# Patient Record
Sex: MALE | Race: WHITE | NOT HISPANIC OR LATINO | Employment: OTHER | URBAN - METROPOLITAN AREA
[De-identification: names, ages, dates, MRNs, and addresses within clinical notes are randomized per-mention and may not be internally consistent; named-entity substitution may affect disease eponyms.]

---

## 2016-05-26 LAB
ALT SERPL-CCNC: 9 U/L
AST SERPL-CCNC: 113 U/L
BUN BLD-MCNC: 20 MG/DL (ref 4–21)
CALCIUM SERPL-MCNC: 9.4 MG/DL
CHLORIDE: 110 (ref 98–107)
CHOLEST SERPL-MSCNC: 151 MG/DL (ref 0–200)
CREAT SERPL-MCNC: 1.2 MG/DL
GLUCOSE: 97
HDLC SERPL-MCNC: 44 MG/DL
LDLC SERPL CALC-MCNC: 94 MG/DL (ref 0–160)
POTASSIUM: 4.9
RISK: 3.4
SODIUM: 143
TRIGL SERPL-MCNC: 67 MG/DL

## 2017-04-23 RX ORDER — HYDROCHLOROTHIAZIDE 12.5 MG/1
CAPSULE ORAL
Qty: 90 CAPSULE | Refills: 11 | Status: SHIPPED | OUTPATIENT
Start: 2017-04-23 | End: 2017-06-27

## 2017-05-31 DIAGNOSIS — F41.9 ANXIETY: ICD-10-CM

## 2017-05-31 RX ORDER — ALPRAZOLAM 0.5 MG/1
TABLET ORAL
Qty: 90 TABLET | Refills: 0 | Status: SHIPPED | OUTPATIENT
Start: 2017-05-31 | End: 2018-01-19

## 2017-06-14 ENCOUNTER — TELEPHONE (OUTPATIENT)
Dept: FAMILY MEDICINE | Facility: CLINIC | Age: 67
End: 2017-06-14

## 2017-06-14 NOTE — TELEPHONE ENCOUNTER
Lab work was entered with the wrong year    Can tell him when you call the wife to come in for annual visit with myself or Lori

## 2017-06-27 ENCOUNTER — OFFICE VISIT (OUTPATIENT)
Dept: FAMILY MEDICINE | Facility: CLINIC | Age: 67
End: 2017-06-27
Payer: MEDICARE

## 2017-06-27 VITALS
TEMPERATURE: 99 F | DIASTOLIC BLOOD PRESSURE: 84 MMHG | BODY MASS INDEX: 25.59 KG/M2 | HEIGHT: 68 IN | WEIGHT: 168.88 LBS | OXYGEN SATURATION: 97 % | HEART RATE: 84 BPM | SYSTOLIC BLOOD PRESSURE: 132 MMHG

## 2017-06-27 DIAGNOSIS — I49.3 PVC (PREMATURE VENTRICULAR CONTRACTION): ICD-10-CM

## 2017-06-27 DIAGNOSIS — F41.9 ANXIETY: ICD-10-CM

## 2017-06-27 DIAGNOSIS — Z11.59 NEED FOR HEPATITIS C SCREENING TEST: ICD-10-CM

## 2017-06-27 DIAGNOSIS — I10 ESSENTIAL HYPERTENSION: ICD-10-CM

## 2017-06-27 DIAGNOSIS — Z00.00 ROUTINE HEALTH MAINTENANCE: Primary | ICD-10-CM

## 2017-06-27 PROCEDURE — 99397 PER PM REEVAL EST PAT 65+ YR: CPT | Mod: S$GLB,,, | Performed by: FAMILY MEDICINE

## 2017-06-27 PROCEDURE — 99499 UNLISTED E&M SERVICE: CPT | Mod: S$GLB,,, | Performed by: FAMILY MEDICINE

## 2017-06-27 NOTE — PROGRESS NOTES
Patient ID: Rodolfo Messina is a 67 y.o. male.    Chief Complaint: Follow-up (lab results and check-up)    HPI      Rodolfo Messina is a 67 y.o. male. here for annual exam.   No new complaints.  Patient with hypertension-stable medicines which include Zestril 40 mg daily.  And hydrochlorothiazide.  Patient with anxiety uses periodically for such.  Patient with ALLERGIC rhinitis and uses Claritin  History of PVCs-no new problems.  Patient with essential tremor-no worse at this time.        Review of Symptoms    Constitutional: Negative.    HENT: Negative.    Eyes: Negative.    Respiratory: Negative.    Cardiovascular: Negative.    Gastrointestinal: Negative.    Endocrine: Negative.    Genitourinary: Negative.    Musculoskeletal: Negative.    Skin: Negative.    Allergic/Immunologic: Negative.    Neurological: Negative.    Hematological: Negative.    Psychiatric/Behavioral: Negative.      Except as above in HPI        Physical  Exam    Constitutional:  Oriented to person, place, and time. Appears well-developed and well-nourished.     HENT:   Head: Normocephalic and atraumatic.     Right Ear: Tympanic membrane, external ear and ear canal normal.     Left Ear: Tympanic membrane, external ear and ear canal normal.     Nose: Nose normal. No rhinorrhea or nasal deformity.     Mouth/Throat: Uvula is midline, oropharynx is clear and moist and mucous membranes are normal.      Eyes: Conjunctivae are normal. Right eye exhibits no discharge. Left eye exhibits no discharge. No scleral icterus.     Neck:  No JVD present. No tracheal deviation  [x]  Neck supple.   [x]  No Carotid bruit    Cardiovascular: Normal rate, regular rhythm and normal heart sounds.      Pulmonary/Chest: Effort normal and breath sounds normal. No stridor. No respiratory distress. No wheezes. No rales.      Musculoskeletal: Normal range of motion. No edema or tenderness.   No deformity     Lymphadenopathy:  No cervical adenopathy.     Neurological:   Alert and oriented to person, place, and time. Coordination normal.   Slight tremor       Skin: Skin is warm and dry. No rash noted.     Psychiatric: Normal mood and affect. Speech is normal and behavior is normal. Judgment and thought content normal.     Complete Blood Count  Lab Results   Component Value Date    RBC 3.80 (L) 12/08/2015    HGB 12.5 (L) 12/08/2015    HCT 36.0 (L) 12/08/2015    MCV 95 12/08/2015    MCH 32.9 (H) 12/08/2015    MCHC 34.7 12/08/2015    RDW 12.8 12/08/2015     12/08/2015    MPV 11.0 12/08/2015    GRAN 2.3 12/08/2015    GRAN 56.4 12/08/2015    LYMPH 1.2 12/08/2015    LYMPH 29.9 12/08/2015    MONO 0.5 12/08/2015    MONO 10.9 12/08/2015    EOS 0.1 12/08/2015    BASO 0.01 12/08/2015    EOSINOPHIL 2.4 12/08/2015    BASOPHIL 0.2 12/08/2015    DIFFMETHOD Automated 12/08/2015       Comprehensive Metabolic Panel  Lab Results   Component Value Date     12/08/2015    BUN 20 05/26/2016    CREATININE 1.2 05/26/2016     05/26/2016    K 4.9 05/26/2016     (A) 05/26/2016    PROT 6.6 12/08/2015    ALBUMIN 3.9 12/08/2015    BILITOT 1.1 (H) 12/08/2015     05/26/2016    ALKPHOS 52 (L) 12/08/2015    CO2 26 12/08/2015    ALT 9 05/26/2016    ANIONGAP 8 12/08/2015    EGFRNONAA >60.0 12/08/2015    ESTGFRAFRICA >60.0 12/08/2015       TSH  Lab Results   Component Value Date    TSH 1.416 12/08/2015       Assessment / Plan:      ICD-10-CM ICD-9-CM   1. Routine health maintenance Z00.00 V70.0   2. Need for hepatitis C screening test Z11.59 V73.89   3. Essential hypertension I10 401.9   4. PVC (premature ventricular contraction) I49.3 427.69   5. Anxiety F41.9 300.00     Routine health maintenance  -     Comprehensive metabolic panel; Future  -     Lipid panel; Future    Need for hepatitis C screening test  -     Hepatitis C antibody; Future; Expected date: 06/27/2017    Essential hypertension  -     Lipid panel; Future    PVC (premature ventricular contraction)  -     Lipid panel;  Future    Anxiety  -     Lipid panel; Future

## 2017-07-05 ENCOUNTER — TELEPHONE (OUTPATIENT)
Dept: FAMILY MEDICINE | Facility: CLINIC | Age: 67
End: 2017-07-05

## 2017-07-05 NOTE — TELEPHONE ENCOUNTER
I left a message for the pt wife with details that I have searched byron and in the paper charts - no tetanus shot noted. Pt needs to have tetanus shot administered at the pharmacy - thru her part d of her insurance

## 2017-08-10 LAB
ALBUMIN SERPL-MCNC: 4 G/DL (ref 3.6–5.1)
ALBUMIN/GLOB SERPL: 1.9 (CALC) (ref 1–2.5)
ALP SERPL-CCNC: 44 U/L (ref 40–115)
ALT SERPL-CCNC: 9 U/L (ref 9–46)
AST SERPL-CCNC: 14 U/L (ref 10–35)
BILIRUB SERPL-MCNC: 0.8 MG/DL (ref 0.2–1.2)
BUN SERPL-MCNC: 23 MG/DL (ref 7–25)
BUN/CREAT SERPL: ABNORMAL (CALC) (ref 6–22)
CALCIUM SERPL-MCNC: 9.2 MG/DL (ref 8.6–10.3)
CHLORIDE SERPL-SCNC: 112 MMOL/L (ref 98–110)
CHOLEST SERPL-MCNC: 161 MG/DL (ref 125–200)
CHOLEST/HDLC SERPL: 3.7 (CALC)
CO2 SERPL-SCNC: 26 MMOL/L (ref 20–31)
CREAT SERPL-MCNC: 1.2 MG/DL (ref 0.7–1.25)
GFR SERPL CREATININE-BSD FRML MDRD: 62 ML/MIN/1.73M2
GLOBULIN SER CALC-MCNC: 2.1 G/DL (CALC) (ref 1.9–3.7)
GLUCOSE SERPL-MCNC: 93 MG/DL (ref 65–99)
HCV AB S/CO SERPL IA: 0.01
HCV AB SERPL QL IA: NORMAL
HDLC SERPL-MCNC: 44 MG/DL
LDLC SERPL CALC-MCNC: 94 MG/DL (CALC)
NONHDLC SERPL-MCNC: 117 MG/DL (CALC)
POTASSIUM SERPL-SCNC: 4.4 MMOL/L (ref 3.5–5.3)
PROT SERPL-MCNC: 6.1 G/DL (ref 6.1–8.1)
SODIUM SERPL-SCNC: 145 MMOL/L (ref 135–146)
TRIGL SERPL-MCNC: 115 MG/DL

## 2017-09-06 RX ORDER — MIRTAZAPINE 30 MG/1
TABLET, FILM COATED ORAL
Qty: 90 TABLET | Refills: 3 | Status: SHIPPED | OUTPATIENT
Start: 2017-09-06 | End: 2018-03-07 | Stop reason: SDUPTHER

## 2017-10-13 ENCOUNTER — OFFICE VISIT (OUTPATIENT)
Dept: INTERNAL MEDICINE | Facility: CLINIC | Age: 67
End: 2017-10-13
Payer: MEDICARE

## 2017-10-13 VITALS
RESPIRATION RATE: 16 BRPM | HEART RATE: 76 BPM | DIASTOLIC BLOOD PRESSURE: 74 MMHG | WEIGHT: 164.38 LBS | SYSTOLIC BLOOD PRESSURE: 132 MMHG | BODY MASS INDEX: 24.99 KG/M2

## 2017-10-13 DIAGNOSIS — Z00.00 ENCOUNTER FOR PREVENTIVE HEALTH EXAMINATION: Primary | ICD-10-CM

## 2017-10-13 DIAGNOSIS — I10 ESSENTIAL HYPERTENSION: Chronic | ICD-10-CM

## 2017-10-13 DIAGNOSIS — I49.3 PVC (PREMATURE VENTRICULAR CONTRACTION): ICD-10-CM

## 2017-10-13 DIAGNOSIS — R25.1 TREMOR: Chronic | ICD-10-CM

## 2017-10-13 DIAGNOSIS — F41.9 ANXIETY: ICD-10-CM

## 2017-10-13 DIAGNOSIS — Z23 IMMUNIZATION DUE: ICD-10-CM

## 2017-10-13 PROCEDURE — 99499 UNLISTED E&M SERVICE: CPT | Mod: S$GLB,,, | Performed by: NURSE PRACTITIONER

## 2017-10-13 PROCEDURE — 99999 PR PBB SHADOW E&M-EST. PATIENT-LVL IV: CPT | Mod: PBBFAC,,, | Performed by: NURSE PRACTITIONER

## 2017-10-13 PROCEDURE — G0439 PPPS, SUBSEQ VISIT: HCPCS | Mod: S$GLB,,, | Performed by: NURSE PRACTITIONER

## 2017-10-13 NOTE — PATIENT INSTRUCTIONS
Counseling and Referral of Other Preventative  (Italic type indicates deductible and co-insurance are waived)    Patient Name: Rodolfo Messina  Today's Date: 10/13/2017      SERVICE LIMITATIONS RECOMMENDATION    Vaccines    · Pneumococcal (once after 65)    · Influenza (annually)    · Hepatitis B (if medium/high risk)    · Prevnar 13      Hepatitis B medium/high risk factors:       - End-stage renal disease       - Hemophiliacs who received Factor VII or         IX concentrates       - Clients of institutions for the mentally             retarded       - Persons who live in the same house as          a HepB carrier       - Homosexual men       - Illicit injectable drug abusers     Pneumococcal: N/A     Influenza: Recommended to patient, declined     Hepatitis B: N/A     Prevnar 13: Recommended to patient, declined    Prostate cancer screening (annually to age 75)     Prostate specific antigen (PSA) Shared decision making with Provider. Sometimes a co-pay may be required if the patient decides to have this test. The USPSTF no longer recommends prostate cancer screening routinely in medicine: every 1 year    Colorectal cancer screening (to age 75)    · Fecal occult blood test (annual)  · Flexible sigmoidoscopy (5y)  · Screening colonoscopy (10y)  · Barium enema   Last done 6/15/2017, recommend to repeat every 1  years    Diabetes self-management training (no USPSTF recommendations)  Requires referral by treating physician for patient with diabetes or renal disease. 10 hours of initial DSMT sessions of no less than 30 minutes each in a continuous 12-month period. 2 hours of follow-up DSMT in subsequent years.  N/A    Glaucoma screening (no USPSTF recommendation)  Diabetes mellitus, family history   , age 50 or over    American, age 65 or over  N/A    Medical nutrition therapy for diabetes or renal disease (no recommended schedule)  Requires referral by treating physician for patient with diabetes  or renal disease or kidney transplant within the past 3 years.  Can be provided in same year as diabetes self-management training (DSMT), and CMS recommends medical nutrition therapy take place after DSMT. Up to 3 hours for initial year and 2 hours in subsequent years.  N/A    Cardiovascular screening blood tests (every 5 years)  · Fasting lipid panel  Order as a panel if possible  Last done 8/9/2017, recommend to repeat every 5  years    Diabetes screening tests (at least every 3 years, Medicare covers annually or at 6-month intervals for prediabetic patients)  · Fasting blood sugar (FBS) or glucose tolerance test (GTT)  Patient must be diagnosed with one of the following:       - Hypertension       - Dyslipidemia       - Obesity (BMI 30kg/m2)       - Previous elevated impaired FBS or GTT       ... or any two of the following:       - Overweight (BMI 25 but <30)       - Family history of diabetes       - Age 65 or older       - History of gestational diabetes or birth of baby weighing more than 9 pounds  N/A    Abdominal aortic aneurysm screening (once)  · Sonogram   Limited to patients who meet one of the following criteria:       - Men who are 65-75 years old and have smoked more than 100 cigarette in their lifetime       - Anyone with a family history of abdominal aortic aneurysm       - Anyone recommended for screening by the USPSTF  N/A    HIV screening (annually for increased risk patients)  · HIV-1 and HIV-2 by EIA, or ALEXANDER, rapid antibody test or oral mucosa transudate  Patients must be at increased risk for HIV infection per USPSTF guidelines or pregnant. Tests covered annually for patient at increased risk or as requested by the patient. Pregnant patients may receive up to 3 tests during pregnancy.  Risks discussed, screening is not recommended    Smoking cessation counseling (up to 8 sessions per year)  Patients must be asymptomatic of tobacco-related conditions to receive as a preventative service.   Non-smoker    Subsequent annual wellness visit  At least 12 months since last AWV  Return in one year     The following information is provided to all patients.  This information is to help you find resources for any of the problems found today that may be affecting your health:                Living healthy guide: www.[facility].[place].[facility]      Understanding Diabetes: www.diabetes.org      Eating healthy: www.cdc.gov/healthyweight      CDC home safety checklist: www.cdc.gov/steadi/patient.html      Agency on Aging: www.goea.[place].[facility]      Alcoholics anonymous (AA): www.aa.org      Physical Activity: www.najma.nih.gov/hq7gbnp      Tobacco use: www.quitwithusla.org

## 2017-10-13 NOTE — PROGRESS NOTES
Rodolfo Messina presented for a  Medicare AWV and comprehensive Health Risk Assessment today. The following components were reviewed and updated:    · Medical history  · Family History  · Social history  · Allergies and Current Medications  · Health Risk Assessment  · Health Maintenance  · Care Team     ** See Completed Assessments for Annual Wellness Visit within the encounter summary.**       The following assessments were completed:  · Living Situation  · CAGE  · Depression Screening  · Timed Get Up and Go  · Whisper Test  · Cognitive Function Screening  · Nutrition Screening  · ADL Screening  · PAQ Screening    Vitals:    10/13/17 0918   BP: 132/74   Pulse: 76   Resp: 16   Weight: 74.6 kg (164 lb 5.7 oz)     Body mass index is 24.99 kg/m².  Physical Exam   Constitutional: He is oriented to person, place, and time. He appears well-developed and well-nourished. No distress.   HENT:   Head: Normocephalic and atraumatic.   Eyes: EOM are normal. Pupils are equal, round, and reactive to light.   Neck: Normal range of motion.   Cardiovascular: Normal rate, regular rhythm and normal heart sounds.    Pulmonary/Chest: Effort normal and breath sounds normal. No respiratory distress.   Musculoskeletal: Normal range of motion.   Neurological: He is alert and oriented to person, place, and time. Coordination normal.   Skin: Skin is warm and dry.   Psychiatric: He has a normal mood and affect. His behavior is normal. Judgment and thought content normal.   Nursing note and vitals reviewed.        Diagnoses and health risks identified today and associated recommendations/orders:    1. Encounter for preventive health examination  Whisper test result: abnormal (right ear)  - patient unable to hear whispered word as I stand 3 feet or 1 feet behind him, but able to recognize whispered word as I stand directly next to him.     2. Essential hypertension  Chronic; stable. Continue current treatment plan as previously prescribed by PCP.      3. PVC (premature ventricular contraction)  Chronic; stable. Patient followed by PCP.     4. Anxiety  Chronic; stable. Continue current treatment plan as previously prescribed by PCP.     5. Tremor  Chronic; stable. Continue current treatment plan as previously prescribed by Neurology (Dr. Liu).      6. Immunization due  Patient declined all immunizations at this time. Patient aware of benefits of being immunized and risks of not getting immunizations.   - Influenza vaccine  - Prevnar 13 vaccine    Provided Rodolfo with a 5-10 year written screening schedule and personal prevention plan. Recommendations were developed using the USPSTF age appropriate recommendations. Education, counseling, and referrals were provided as needed. After Visit Summary printed and given to patient which includes a list of additional screenings\tests needed.    Return for HRA visit in 1 year.    Akanksha Lozano NP

## 2017-11-21 RX ORDER — LISINOPRIL 40 MG/1
TABLET ORAL
Qty: 180 TABLET | Refills: 3 | Status: SHIPPED | OUTPATIENT
Start: 2017-11-21 | End: 2018-01-19

## 2017-12-20 ENCOUNTER — OFFICE VISIT (OUTPATIENT)
Dept: FAMILY MEDICINE | Facility: CLINIC | Age: 67
End: 2017-12-20
Payer: MEDICARE

## 2017-12-20 VITALS
DIASTOLIC BLOOD PRESSURE: 80 MMHG | OXYGEN SATURATION: 97 % | WEIGHT: 167.31 LBS | TEMPERATURE: 100 F | HEIGHT: 68 IN | SYSTOLIC BLOOD PRESSURE: 136 MMHG | HEART RATE: 108 BPM | BODY MASS INDEX: 25.36 KG/M2

## 2017-12-20 DIAGNOSIS — J04.0 LARYNGITIS: ICD-10-CM

## 2017-12-20 DIAGNOSIS — R68.89 FLU-LIKE SYMPTOMS: Primary | ICD-10-CM

## 2017-12-20 LAB
CTP QC/QA: YES
FLUAV AG NPH QL: NEGATIVE
FLUBV AG NPH QL: NEGATIVE

## 2017-12-20 PROCEDURE — 96372 THER/PROPH/DIAG INJ SC/IM: CPT | Mod: S$GLB,,, | Performed by: FAMILY MEDICINE

## 2017-12-20 PROCEDURE — 99213 OFFICE O/P EST LOW 20 MIN: CPT | Mod: 25,S$GLB,, | Performed by: NURSE PRACTITIONER

## 2017-12-20 PROCEDURE — 87804 INFLUENZA ASSAY W/OPTIC: CPT | Mod: ,,, | Performed by: NURSE PRACTITIONER

## 2017-12-20 RX ORDER — TRIAMCINOLONE ACETONIDE 40 MG/ML
80 INJECTION, SUSPENSION INTRA-ARTICULAR; INTRAMUSCULAR
Status: COMPLETED | OUTPATIENT
Start: 2017-12-20 | End: 2017-12-20

## 2017-12-20 RX ADMIN — TRIAMCINOLONE ACETONIDE 80 MG: 40 INJECTION, SUSPENSION INTRA-ARTICULAR; INTRAMUSCULAR at 02:12

## 2017-12-20 NOTE — PROGRESS NOTES
Subjective:       Patient ID: Rodolfo Messina is a 67 y.o. male.    Chief Complaint: Fever (chills ) and Neck Pain (eye pain)    Fever    This is a new problem. The current episode started yesterday. The problem occurs constantly. The problem has been unchanged. The maximum temperature noted was 100 to 100.9 F. The temperature was taken using an oral thermometer. Pertinent negatives include no abdominal pain, chest pain, congestion, coughing, diarrhea, ear pain, headaches, muscle aches, nausea, rash, sleepiness, sore throat, urinary pain, vomiting or wheezing. The treatment provided mild relief.   Risk factors: no contaminated food, no contaminated water, no hx of cancer, no immunosuppression, no occupational exposure, no recent sickness, no recent travel and no sick contacts      Review of Systems   Constitutional: Positive for fatigue and fever. Negative for chills and diaphoresis.   HENT: Negative for congestion, ear pain and sore throat.         Pain beyond eyes     Respiratory: Negative for cough, chest tightness and wheezing.    Cardiovascular: Negative for chest pain.   Gastrointestinal: Negative for abdominal pain, diarrhea, nausea and vomiting.   Genitourinary: Negative for dysuria.   Musculoskeletal: Positive for neck pain.   Skin: Negative for rash.   Neurological: Negative for headaches.       Objective:      Physical Exam   Constitutional: He appears well-developed and well-nourished. No distress.   HENT:   Right Ear: Tympanic membrane and external ear normal.   Left Ear: Tympanic membrane and external ear normal.   Nose: No mucosal edema or rhinorrhea. Right sinus exhibits no maxillary sinus tenderness and no frontal sinus tenderness. Left sinus exhibits no maxillary sinus tenderness and no frontal sinus tenderness.   Mouth/Throat: No oropharyngeal exudate, posterior oropharyngeal edema or posterior oropharyngeal erythema.   Cardiovascular: Normal rate, regular rhythm and normal heart sounds.     Pulmonary/Chest: Effort normal and breath sounds normal. No respiratory distress. He has no wheezes.   Skin: Skin is warm and dry. He is not diaphoretic.   Psychiatric: He has a normal mood and affect. His behavior is normal. Judgment and thought content normal.   Vitals reviewed.      Assessment:       1. Flu-like symptoms    2. Laryngitis        Plan:       Flu-like symptoms  -     POCT Influenza A/B    Laryngitis  -     triamcinolone acetonide injection 80 mg; Inject 2 mLs (80 mg total) into the muscle one time.      Rest hydrate  Alternate advil and tylenol for fever

## 2017-12-21 RX ORDER — AZITHROMYCIN 250 MG/1
250 TABLET, FILM COATED ORAL DAILY
Qty: 6 TABLET | Refills: 0 | Status: SHIPPED | OUTPATIENT
Start: 2017-12-21 | End: 2017-12-26

## 2017-12-28 ENCOUNTER — TELEPHONE (OUTPATIENT)
Dept: FAMILY MEDICINE | Facility: CLINIC | Age: 67
End: 2017-12-28

## 2017-12-28 DIAGNOSIS — J06.9 UPPER RESPIRATORY TRACT INFECTION, UNSPECIFIED TYPE: ICD-10-CM

## 2017-12-28 RX ORDER — FLUTICASONE PROPIONATE 50 MCG
1 SPRAY, SUSPENSION (ML) NASAL 2 TIMES DAILY
Qty: 1 BOTTLE | Refills: 11 | Status: ON HOLD | OUTPATIENT
Start: 2017-12-28 | End: 2018-03-06

## 2017-12-28 RX ORDER — CODEINE PHOSPHATE AND GUAIFENESIN 10; 100 MG/5ML; MG/5ML
5 SOLUTION ORAL 3 TIMES DAILY PRN
Qty: 180 ML | Refills: 0 | Status: SHIPPED | OUTPATIENT
Start: 2017-12-28 | End: 2018-01-07

## 2017-12-28 NOTE — TELEPHONE ENCOUNTER
----- Message from Monse Agosto sent at 12/28/2017 10:00 AM CST -----  Contact: Lupe  Patient saw you last week. Still has cough. Wife requesting cough syrup & the nasal spray sent to [facility]. Patient

## 2018-01-14 ENCOUNTER — HOSPITAL ENCOUNTER (EMERGENCY)
Facility: HOSPITAL | Age: 68
Discharge: HOME | End: 2018-01-14
Attending: EMERGENCY MEDICINE
Payer: MEDICARE

## 2018-01-14 VITALS
HEIGHT: 68 IN | HEART RATE: 83 BPM | OXYGEN SATURATION: 100 % | BODY MASS INDEX: 25.31 KG/M2 | SYSTOLIC BLOOD PRESSURE: 137 MMHG | WEIGHT: 167 LBS | DIASTOLIC BLOOD PRESSURE: 74 MMHG | RESPIRATION RATE: 16 BRPM | TEMPERATURE: 98 F

## 2018-01-14 DIAGNOSIS — R10.84 GENERALIZED ABDOMINAL PAIN: Primary | ICD-10-CM

## 2018-01-14 DIAGNOSIS — M54.50 ACUTE RIGHT-SIDED LOW BACK PAIN WITHOUT SCIATICA: ICD-10-CM

## 2018-01-14 LAB
ALBUMIN SERPL BCP-MCNC: 3 G/DL
ALP SERPL-CCNC: 78 U/L
ALT SERPL W/O P-5'-P-CCNC: 33 U/L
ANION GAP SERPL CALC-SCNC: 11 MMOL/L
AST SERPL-CCNC: 18 U/L
BACTERIA #/AREA URNS AUTO: ABNORMAL /HPF
BASOPHILS # BLD AUTO: 0.01 K/UL
BASOPHILS NFR BLD: 0.1 %
BILIRUB SERPL-MCNC: 0.7 MG/DL
BILIRUB UR QL STRIP: NEGATIVE
BUN SERPL-MCNC: 16 MG/DL
CALCIUM SERPL-MCNC: 9.5 MG/DL
CHLORIDE SERPL-SCNC: 103 MMOL/L
CLARITY UR REFRACT.AUTO: ABNORMAL
CO2 SERPL-SCNC: 22 MMOL/L
COLOR UR AUTO: YELLOW
CREAT SERPL-MCNC: 1.4 MG/DL
DIFFERENTIAL METHOD: ABNORMAL
EOSINOPHIL # BLD AUTO: 0 K/UL
EOSINOPHIL NFR BLD: 0.3 %
ERYTHROCYTE [DISTWIDTH] IN BLOOD BY AUTOMATED COUNT: 12.6 %
EST. GFR  (AFRICAN AMERICAN): 59.7 ML/MIN/1.73 M^2
EST. GFR  (NON AFRICAN AMERICAN): 51.6 ML/MIN/1.73 M^2
GLUCOSE SERPL-MCNC: 109 MG/DL
GLUCOSE UR QL STRIP: NEGATIVE
HCT VFR BLD AUTO: 28.2 %
HGB BLD-MCNC: 9.3 G/DL
HGB UR QL STRIP: ABNORMAL
HYALINE CASTS UR QL AUTO: 1 /LPF
IMM GRANULOCYTES # BLD AUTO: 0.05 K/UL
IMM GRANULOCYTES NFR BLD AUTO: 0.7 %
KETONES UR QL STRIP: NEGATIVE
LACTATE SERPL-SCNC: 1.5 MMOL/L
LEUKOCYTE ESTERASE UR QL STRIP: NEGATIVE
LIPASE SERPL-CCNC: 12 U/L
LYMPHOCYTES # BLD AUTO: 0.6 K/UL
LYMPHOCYTES NFR BLD: 7.7 %
MCH RBC QN AUTO: 31.6 PG
MCHC RBC AUTO-ENTMCNC: 33 G/DL
MCV RBC AUTO: 96 FL
MICROSCOPIC COMMENT: ABNORMAL
MONOCYTES # BLD AUTO: 0.5 K/UL
MONOCYTES NFR BLD: 6.2 %
NEUTROPHILS # BLD AUTO: 6.2 K/UL
NEUTROPHILS NFR BLD: 85 %
NITRITE UR QL STRIP: NEGATIVE
NRBC BLD-RTO: 0 /100 WBC
PH UR STRIP: 6 [PH] (ref 5–8)
PLATELET # BLD AUTO: 208 K/UL
PMV BLD AUTO: 10.6 FL
POTASSIUM SERPL-SCNC: 4.7 MMOL/L
PROT SERPL-MCNC: 7.5 G/DL
PROT UR QL STRIP: NEGATIVE
RBC # BLD AUTO: 2.94 M/UL
RBC #/AREA URNS AUTO: 8 /HPF (ref 0–4)
SODIUM SERPL-SCNC: 136 MMOL/L
SP GR UR STRIP: 1.01 (ref 1–1.03)
SQUAMOUS #/AREA URNS AUTO: 3 /HPF
URN SPEC COLLECT METH UR: ABNORMAL
UROBILINOGEN UR STRIP-ACNC: NEGATIVE EU/DL
WBC # BLD AUTO: 7.25 K/UL
WBC #/AREA URNS AUTO: 1 /HPF (ref 0–5)

## 2018-01-14 PROCEDURE — 85025 COMPLETE CBC W/AUTO DIFF WBC: CPT

## 2018-01-14 PROCEDURE — 99284 EMERGENCY DEPT VISIT MOD MDM: CPT | Mod: 25

## 2018-01-14 PROCEDURE — 25500020 PHARM REV CODE 255: Performed by: EMERGENCY MEDICINE

## 2018-01-14 PROCEDURE — 80053 COMPREHEN METABOLIC PANEL: CPT

## 2018-01-14 PROCEDURE — 83605 ASSAY OF LACTIC ACID: CPT

## 2018-01-14 PROCEDURE — 96374 THER/PROPH/DIAG INJ IV PUSH: CPT | Mod: 59

## 2018-01-14 PROCEDURE — 83690 ASSAY OF LIPASE: CPT

## 2018-01-14 PROCEDURE — 63600175 PHARM REV CODE 636 W HCPCS: Performed by: EMERGENCY MEDICINE

## 2018-01-14 PROCEDURE — 99284 EMERGENCY DEPT VISIT MOD MDM: CPT | Mod: ,,, | Performed by: EMERGENCY MEDICINE

## 2018-01-14 PROCEDURE — 96361 HYDRATE IV INFUSION ADD-ON: CPT

## 2018-01-14 PROCEDURE — 25000003 PHARM REV CODE 250: Performed by: EMERGENCY MEDICINE

## 2018-01-14 PROCEDURE — 81001 URINALYSIS AUTO W/SCOPE: CPT

## 2018-01-14 RX ORDER — KETOROLAC TROMETHAMINE 10 MG/1
10 TABLET, FILM COATED ORAL EVERY 6 HOURS PRN
Qty: 15 TABLET | Refills: 0 | Status: SHIPPED | OUTPATIENT
Start: 2018-01-14 | End: 2018-01-27

## 2018-01-14 RX ORDER — METHOCARBAMOL 750 MG/1
750 TABLET, FILM COATED ORAL
Status: COMPLETED | OUTPATIENT
Start: 2018-01-14 | End: 2018-01-14

## 2018-01-14 RX ORDER — METHOCARBAMOL 500 MG/1
1000 TABLET, FILM COATED ORAL 3 TIMES DAILY
Qty: 30 TABLET | Refills: 0 | Status: SHIPPED | OUTPATIENT
Start: 2018-01-14 | End: 2018-01-19

## 2018-01-14 RX ORDER — KETOROLAC TROMETHAMINE 30 MG/ML
15 INJECTION, SOLUTION INTRAMUSCULAR; INTRAVENOUS
Status: COMPLETED | OUTPATIENT
Start: 2018-01-14 | End: 2018-01-14

## 2018-01-14 RX ADMIN — SODIUM CHLORIDE 1000 ML: 0.9 INJECTION, SOLUTION INTRAVENOUS at 12:01

## 2018-01-14 RX ADMIN — METHOCARBAMOL 750 MG: 750 TABLET ORAL at 12:01

## 2018-01-14 RX ADMIN — SODIUM CHLORIDE 1000 ML: 0.9 INJECTION, SOLUTION INTRAVENOUS at 11:01

## 2018-01-14 RX ADMIN — IOHEXOL 75 ML: 350 INJECTION, SOLUTION INTRAVENOUS at 03:01

## 2018-01-14 RX ADMIN — KETOROLAC TROMETHAMINE 15 MG: 30 INJECTION, SOLUTION INTRAMUSCULAR at 12:01

## 2018-01-14 NOTE — ED PROVIDER NOTES
Encounter Date: 1/14/2018       History     Chief Complaint   Patient presents with    Generalized Body Aches    Cough     This is a 67-year-old male with a history of diverticulosis, hypertension, recent URI symptoms, who presents with abdominal pain for 2 days.  Patient stated that he was turning in bed and started feeling a sharp pain starting in his right flank and radiates to the epigastrium.  The pain is 10 out of 10, with no alleviating factor, worse with bending, sitting up and laying down.  Patient stated he is had some difficulty with urinating, and has had some smelly urine, but denies hematuria, penile drainage, bloody stools, fever, chills, vomiting, chest pain, palpitations, headache.          Review of patient's allergies indicates:   Allergen Reactions    Ciprofloxacin     Flagyl [metronidazole]     Mysoline [primidone]     Omnicef [cefdinir]      Past Medical History:   Diagnosis Date    Anxiety     Diverticulosis     Hypertension      Past Surgical History:   Procedure Laterality Date    APPENDECTOMY      COLONOSCOPY      EYE SURGERY Right     cataract extraction    FRACTURE SURGERY Right     index finger    TONSILLECTOMY       Family History   Problem Relation Age of Onset    Stroke Mother     Heart disease Mother      CABG    Heart disease Father      CABG    No Known Problems Sister     No Known Problems Brother      Social History   Substance Use Topics    Smoking status: Never Smoker    Smokeless tobacco: Never Used    Alcohol use Yes      Comment: Occasionally drink wine.      Review of Systems   Constitutional: Negative for fever.   HENT: Negative for sore throat.    Respiratory: Negative for shortness of breath.    Cardiovascular: Negative for chest pain and palpitations.   Gastrointestinal: Positive for abdominal pain. Negative for blood in stool, constipation, nausea and vomiting.   Genitourinary: Positive for difficulty urinating. Negative for discharge, dysuria,  frequency, scrotal swelling and testicular pain.        Endorses foul smelling urine    Musculoskeletal: Negative for back pain.   Skin: Negative for rash.   Neurological: Negative for dizziness and weakness.   Hematological: Does not bruise/bleed easily.       Physical Exam     Initial Vitals [01/14/18 0952]   BP Pulse Resp Temp SpO2   131/61 101 20 99.1 °F (37.3 °C) 99 %      MAP       84.33         Physical Exam    Nursing note and vitals reviewed.  Constitutional: He is not diaphoretic. No distress.   HENT:   Head: Normocephalic and atraumatic.   Eyes: EOM are normal. Pupils are equal, round, and reactive to light.   Neck: Normal range of motion.   Cardiovascular: Normal rate, regular rhythm and normal heart sounds. Exam reveals no friction rub.    No murmur heard.  Pulmonary/Chest: Breath sounds normal. No respiratory distress. He has no wheezes. He has no rhonchi. He has no rales. He exhibits no tenderness.   Abdominal: Soft. Bowel sounds are normal. He exhibits no distension. There is no tenderness. There is no rebound and no guarding.   No CVA tenderness.    Genitourinary: Rectal exam shows guaiac negative stool. Guaiac negative stool.   Genitourinary Comments: Prostate is mildly enlarged but nontender.  Bedside ultrasound demonstrated about 300 mL of urine after the patient voided.  Abdominal aorta measures 2.2 cm per bedside ultrasound.   Musculoskeletal: Normal range of motion. He exhibits no edema or tenderness.   Lymphadenopathy:     He has no cervical adenopathy.   Neurological: He is alert and oriented to person, place, and time. No cranial nerve deficit or sensory deficit.   Skin: No rash noted.         ED Course   Procedures  Labs Reviewed - No data to display                APC / Resident Notes:   This is a 67-year-old male who presents with right flank pain for the last 2 days worse with movements.      Ddx: Biliary disease, diverticulitis, disk herniation, musculoskeletal pain, UTI, urinary  retention, esophagitis, colitis, less likely appendicitis as the patient has had an appendectomy in the past.  Less likely atypical pneumonia the patient has had 2 normal chest x-ray in the past week.      Urinalysis with no evidence of UTI.  Labs is no leukocytosis.  Mild anemia but no evidence of active GI bleeding.  CT abdomen pelvis ordered.  Patient did get better with Toradol and Robaxin.  We will continue to monitor while awaiting CT results.\    Darrick Myers MD   Emergency Medicine PGY 3  3:15 PM 1/14/2018    4:23 PM  Patient feels better after Toradol and Robaxin.    CT abdomen pelvis with no deep no evidence of nephrolithiasis, appendicitis, colitis, but demonstrated bladder wall thickening.  Patient updated about the results.  Patient referred to the urologist as an outpatient and was advised to follow-up in the next couple days.  He is given a prescription for Robaxin and Toradol by mouth at home.  Patient given return precautions.      Darrick Myers MD   Emergency Medicine PGY 3  4:25 PM 1/14/2018                      ED Course      Clinical Impression:   The primary encounter diagnosis was Generalized abdominal pain. A diagnosis of Acute right-sided low back pain without sciatica was also pertinent to this visit.                           Darrick Myers MD  Resident  01/14/18 3640

## 2018-01-14 NOTE — ED TRIAGE NOTES
Patient states that he has been sick with URI since 12/20/17.  Presents today because of continued generalized body aches   States that he has seen the doctor several times and that his cough is better but he continues to have body aches and feel bad.    Pt identifiers checked and correct  LOC: The patient is awake, alert, aware of environment with an appropriate affect. Oriented x3, speaking appropriately  APPEARANCE: Pt resting comfortably, in no acute distress, pt is clean and well groomed, clothing properly fastened  SKIN: Skin warm, dry and intact, normal skin turgor, moist mucus membranes  RESPIRATORY: Airway is open and patent, respirations are spontaneous, even and unlabored, normal effort and rate  MUSCULOSKELETAL: No obvious deformities.

## 2018-01-15 ENCOUNTER — LAB VISIT (OUTPATIENT)
Dept: LAB | Facility: HOSPITAL | Age: 68
End: 2018-01-15
Payer: MEDICARE

## 2018-01-15 ENCOUNTER — OFFICE VISIT (OUTPATIENT)
Dept: UROLOGY | Facility: CLINIC | Age: 68
End: 2018-01-15
Payer: MEDICARE

## 2018-01-15 VITALS
DIASTOLIC BLOOD PRESSURE: 80 MMHG | HEART RATE: 94 BPM | WEIGHT: 170.44 LBS | SYSTOLIC BLOOD PRESSURE: 138 MMHG | BODY MASS INDEX: 25.91 KG/M2

## 2018-01-15 DIAGNOSIS — R33.9 INCOMPLETE BLADDER EMPTYING: ICD-10-CM

## 2018-01-15 DIAGNOSIS — N40.1 BPH WITH OBSTRUCTION/LOWER URINARY TRACT SYMPTOMS: Primary | ICD-10-CM

## 2018-01-15 DIAGNOSIS — N13.8 BPH WITH OBSTRUCTION/LOWER URINARY TRACT SYMPTOMS: Primary | ICD-10-CM

## 2018-01-15 DIAGNOSIS — R35.1 NOCTURIA: ICD-10-CM

## 2018-01-15 DIAGNOSIS — N40.1 BPH WITH OBSTRUCTION/LOWER URINARY TRACT SYMPTOMS: ICD-10-CM

## 2018-01-15 DIAGNOSIS — N13.8 BPH WITH OBSTRUCTION/LOWER URINARY TRACT SYMPTOMS: ICD-10-CM

## 2018-01-15 LAB — COMPLEXED PSA SERPL-MCNC: 1.7 NG/ML

## 2018-01-15 PROCEDURE — 99214 OFFICE O/P EST MOD 30 MIN: CPT | Mod: 25,S$GLB,, | Performed by: PHYSICIAN ASSISTANT

## 2018-01-15 PROCEDURE — 99999 PR PBB SHADOW E&M-EST. PATIENT-LVL III: CPT | Mod: PBBFAC,,, | Performed by: PHYSICIAN ASSISTANT

## 2018-01-15 PROCEDURE — 81002 URINALYSIS NONAUTO W/O SCOPE: CPT | Mod: S$GLB,,, | Performed by: PHYSICIAN ASSISTANT

## 2018-01-15 PROCEDURE — 36415 COLL VENOUS BLD VENIPUNCTURE: CPT

## 2018-01-15 PROCEDURE — 84153 ASSAY OF PSA TOTAL: CPT

## 2018-01-15 PROCEDURE — 51798 US URINE CAPACITY MEASURE: CPT | Mod: S$GLB,,, | Performed by: PHYSICIAN ASSISTANT

## 2018-01-15 RX ORDER — TAMSULOSIN HYDROCHLORIDE 0.4 MG/1
0.4 CAPSULE ORAL DAILY
COMMUNITY
End: 2018-02-07 | Stop reason: SDUPTHER

## 2018-01-15 NOTE — LETTER
January 16, 2018      Leyda Collins MD  [address]  [facility] [address]           [facility] 4[facility]  [address]  [facility] [address]  Phone: [phone]          Patient: Rodolfo Messina   MR Number: [id number]   YOB: 1950   Date of Visit: 1/15/2018       Dear Dr. Leyda Collins:    Thank you for referring Rodolfo Messina to me for evaluation. Attached you will find relevant portions of my assessment and plan of care.    If you have questions, please do not hesitate to call me. I look forward to following Rodolfo Messina along with you.    Sincerely,    Maria C Arceo PA-C    Enclosure  CC:  No Recipients    If you would like to receive this communication electronically, please contact [email or web address] or [phone] to request more information on [facility] access.    For providers and/or their staff who would like to refer a patient to [facility], please contact us through our one-stop-shop provider referral line, [facility], [place], at [phone].    If you feel you have received this communication in error or would no longer like to receive these types of communications, please e-mail [email or web address]

## 2018-01-15 NOTE — PROGRESS NOTES
CHIEF COMPLAINT:    Mr. Messina is a 67 y.o. male presenting for incomplete bladder emptying.   PRESENTING ILLNESS:    Rodolfo Messina is a 67 y.o. male who presents for incomplete bladder emptying.  He reports developing a cold several years ago.  He started taking decongestants.  After taking the decongestants he develop difficulty urinating, nocturia and lower back pain.  He was seen in an urgent care facility that prescribed flomax and instructed him to stop the decongestant.  He was also given steroids.    He was seen in the ED yesterday for right flank pain.    CT abd/pelvis  showed urinary bladder wall thickening, consistent with cystitis or chronic bladder outlet obstruction.  Kidneys demonstrate mild parenchymal loss and small cysts but no stone, solid mass, or obstruction.  PVR in ED revealed 300cc in the bladder.    He was given a prescription for Toradol and robaxin.     Prior to his cold he did not have any urinary complaints.  He experienced urgency.  He denies nocturia, hesitancy and intermittency.  He reports a history of recurrent prostatitis 20 years ago.  He also has a history of kidney stones.    He has been taking flomax for about a week.       REVIEW OF SYSTEMS:    Constitutional: Negative for fever and chills.   HENT: Negative for hearing loss.   Eyes: Negative for visual disturbance.   Respiratory: Negative for shortness of breath.   Cardiovascular: Negative for chest pain.   Gastrointestinal: Negative for vomiting, and constipation.   Genitourinary:  See HPI.   Neurological: Negative for dizziness.   Hematological: Does not bruise/bleed easily.   Psychiatric/Behavioral: Negative for confusion.       PATIENT HISTORY:    Past Medical History:   Diagnosis Date    Anxiety     Diverticulosis     Hypertension        Past Surgical History:   Procedure Laterality Date    APPENDECTOMY      COLONOSCOPY      EYE SURGERY Right     cataract extraction    FRACTURE SURGERY Right     index finger     TONSILLECTOMY         Family History   Problem Relation Age of Onset    Stroke Mother     Heart disease Mother      CABG    Heart disease Father      CABG    No Known Problems Sister     No Known Problems Brother        Social History     Social History    Marital status:      Spouse name: N/A    Number of children: N/A    Years of education: N/A     Occupational History     [employer]     Social History Main Topics    Smoking status: Never Smoker    Smokeless tobacco: Never Used    Alcohol use Yes      Comment: Occasionally drink wine.     Drug use: No    Sexual activity: Not Currently     Partners: Female     Other Topics Concern    Not on file     Social History Narrative    No narrative on file       Allergies:  Ciprofloxacin; Flagyl [metronidazole]; Mysoline [primidone]; and Omnicef [cefdinir]    Medications:    Current Outpatient Prescriptions:     alprazolam (XANAX) 0.5 MG tablet, TAKE ONE TABLET BY MOUTH ONCE DAILY AS NEEDED, Disp: 90 tablet, Rfl: 0    aspirin (ECOTRIN) 81 MG EC tablet, Take 81 mg by mouth once daily., Disp: , Rfl:     fluticasone (FLONASE) 50 mcg/actuation nasal spray, 1 spray by Each Nare route 2 (two) times daily., Disp: 1 Bottle, Rfl: 11    hydrochlorothiazide (HYDRODIURIL) 12.5 MG Tab, Take 12.5 mg by mouth every other day., Disp: , Rfl:     ketorolac (TORADOL) 10 mg tablet, Take 1 tablet (10 mg total) by mouth every 6 (six) hours as needed for Pain., Disp: 15 tablet, Rfl: 0    lisinopril (PRINIVIL,ZESTRIL) 40 MG tablet, TAKE 1 TABLET TWICE DAILY, Disp: 180 tablet, Rfl: 3    loratadine (CLARITIN) 10 mg tablet, Take 10 mg by mouth once daily., Disp: , Rfl:     methocarbamol (ROBAXIN) 500 MG Tab, Take 2 tablets (1,000 mg total) by mouth 3 (three) times daily., Disp: 30 tablet, Rfl: 0    mirtazapine (REMERON) 30 MG tablet, TAKE 1 TABLET EVERY EVENING, Disp: 90 tablet, Rfl: 3    tamsulosin (FLOMAX) 0.4 mg Cp24, Take 0.4 mg by mouth once daily., Disp: , Rfl:      PHYSICAL EXAMINATION:  Constitutional: He appears well-developed and well-nourished.  He is in no apparent distress.    Eyes: No scleral icterus noted bilaterally.  No discharge noted bilaterally.      Cardiovascular: Normal rate.  No pitting edema noted in lower extremities bilaterally    Pulmonary/Chest: Effort normal. No respiratory distress.     Abdominal:  He exhibits no distension.  There is no CVA tenderness.     Lymphadenopathy:        Right: No supraclavicular adenopathy present.        Left: No supraclavicular adenopathy present.     Neurological: He is alert and oriented to person, place, and time.  Tremor noted in hands.     Skin: Skin is warm and dry.     Psych: Cooperative with normal affect.    Genitourinary: GUSTAVO declined.  Patient states it was done yesterday.  Per ED note Prostate is mildly enlarged but nontender.    Physical Exam    Bladder scan performed by Nurse Le.  PVR 328ml    LABS:  U/a: 1.015, pH 5, negative.    Creatinine (1/15/18) 1.4      eGFR if non  (1/15/18) 51.6               IMPRESSION:    Encounter Diagnoses   Name Primary?    BPH with obstruction/lower urinary tract symptoms Yes    Incomplete bladder emptying     Nocturia          PLAN:    Discussed today's PVR of 328ml.  Discussed possible bladder decompensation and kidney damage as a result of urinary retention.   Discussed lackey catheter placement vs CIC vs alpha blocker to help empty bladder  Given patient's tremors he likely will not be able to perform CIC.    Patient does not want to have indwelling lackey catheter.  Given patient's normal creatinine and no hydro, we will do a trial of 0.8mg of flomax.    Patient instructed to increase flomax to 0.8mg  Daily  He will follow up in 3-4 weeks to recheck pvr.   If pvr is still elevated discussed urodynamics to further investigate.      Will check PSA as he is due.     Maria C Arceo PA-C

## 2018-01-18 ENCOUNTER — TELEPHONE (OUTPATIENT)
Dept: FAMILY MEDICINE | Facility: CLINIC | Age: 68
End: 2018-01-18

## 2018-01-18 DIAGNOSIS — M54.9 BACK PAIN, UNSPECIFIED BACK LOCATION, UNSPECIFIED BACK PAIN LATERALITY, UNSPECIFIED CHRONICITY: Primary | ICD-10-CM

## 2018-01-18 NOTE — TELEPHONE ENCOUNTER
----- Message from Monse Agosto sent at 1/18/2018 11:06 AM CST -----  Contact: [facility] [phone] Lupe  Patient requesting referral to Dr Light-chiropractor for back pain(office # [phone]). Please advise once done. Patient's wife will self-schedule

## 2018-01-18 NOTE — TELEPHONE ENCOUNTER
Unable to reach ms sam  I left message for mr pickett that referral has been faxed  I will try to reach out to mr sam again tomorrow

## 2018-01-19 ENCOUNTER — OFFICE VISIT (OUTPATIENT)
Dept: FAMILY MEDICINE | Facility: CLINIC | Age: 68
End: 2018-01-19
Payer: MEDICARE

## 2018-01-19 VITALS
WEIGHT: 173.38 LBS | DIASTOLIC BLOOD PRESSURE: 96 MMHG | HEIGHT: 68 IN | OXYGEN SATURATION: 99 % | TEMPERATURE: 98 F | HEART RATE: 110 BPM | BODY MASS INDEX: 26.28 KG/M2 | SYSTOLIC BLOOD PRESSURE: 148 MMHG

## 2018-01-19 DIAGNOSIS — M54.50 ACUTE BILATERAL LOW BACK PAIN WITHOUT SCIATICA: ICD-10-CM

## 2018-01-19 DIAGNOSIS — R05.8 DRY COUGH: ICD-10-CM

## 2018-01-19 DIAGNOSIS — M25.473 ANKLE EDEMA: ICD-10-CM

## 2018-01-19 DIAGNOSIS — I10 ESSENTIAL HYPERTENSION: Primary | ICD-10-CM

## 2018-01-19 DIAGNOSIS — F41.9 ANXIETY: ICD-10-CM

## 2018-01-19 DIAGNOSIS — G47.00 INSOMNIA, UNSPECIFIED TYPE: ICD-10-CM

## 2018-01-19 PROCEDURE — 99214 OFFICE O/P EST MOD 30 MIN: CPT | Mod: S$GLB,,, | Performed by: NURSE PRACTITIONER

## 2018-01-19 PROCEDURE — 99499 UNLISTED E&M SERVICE: CPT | Mod: S$GLB,,, | Performed by: NURSE PRACTITIONER

## 2018-01-19 RX ORDER — ALBUTEROL SULFATE 90 UG/1
AEROSOL, METERED RESPIRATORY (INHALATION)
COMMUNITY
Start: 2017-12-29 | End: 2018-07-17

## 2018-01-19 RX ORDER — AMLODIPINE BESYLATE 10 MG/1
10 TABLET ORAL DAILY
Qty: 30 TABLET | Refills: 1 | Status: ON HOLD | OUTPATIENT
Start: 2018-01-19 | End: 2018-03-06

## 2018-01-19 RX ORDER — BROMPHENIRAMINE MALEATE, PSEUDOEPHEDRINE HYDROCHLORIDE, AND DEXTROMETHORPHAN HYDROBROMIDE 2; 30; 10 MG/5ML; MG/5ML; MG/5ML
SYRUP ORAL
Status: ON HOLD | COMMUNITY
Start: 2017-12-29 | End: 2018-03-06 | Stop reason: HOSPADM

## 2018-01-19 RX ORDER — HYDROCODONE BITARTRATE AND ACETAMINOPHEN 5; 325 MG/1; MG/1
1 TABLET ORAL EVERY 6 HOURS PRN
Qty: 20 TABLET | Refills: 0 | Status: ON HOLD | OUTPATIENT
Start: 2018-01-19 | End: 2018-01-28 | Stop reason: HOSPADM

## 2018-01-19 RX ORDER — BENZONATATE 100 MG/1
CAPSULE ORAL
COMMUNITY
Start: 2018-01-08 | End: 2018-01-19

## 2018-01-19 RX ORDER — ALPRAZOLAM 0.25 MG/1
0.25 TABLET ORAL NIGHTLY PRN
Qty: 30 TABLET | Refills: 0 | Status: ON HOLD | OUTPATIENT
Start: 2018-01-19 | End: 2018-03-06 | Stop reason: HOSPADM

## 2018-01-19 NOTE — PROGRESS NOTES
Subjective:       Patient ID: Rodolfo Messina is a 67 y.o. male.    Chief Complaint: Back Pain; Cough; and Joint Swelling    Back Pain   This is a new (he went to the ED) problem. The current episode started 1 to 4 weeks ago. The problem occurs constantly. The problem is unchanged. The pain is present in the lumbar spine (across the hold back). The pain does not radiate. The pain is at a severity of 10/10. The pain is moderate. The pain is the same all the time. Stiffness is present all day. Pertinent negatives include no abdominal pain, bladder incontinence, bowel incontinence, chest pain, dysuria, fever, headaches, leg pain, numbness, paresis, paresthesias, pelvic pain, perianal numbness, tingling, weakness or weight loss. Treatments tried: robaxin and tramadol. The treatment provided no relief.   Cough   This is a new problem. The current episode started 1 to 4 weeks ago. The problem has been unchanged. The problem occurs every few minutes. Pertinent negatives include no chest pain, chills, ear congestion, ear pain, fever, headaches, heartburn, hemoptysis, myalgias, nasal congestion, postnasal drip, rash, rhinorrhea, sore throat, shortness of breath, sweats, weight loss or wheezing. He has tried nothing for the symptoms. There is no history of asthma, bronchiectasis, bronchitis, COPD, emphysema or environmental allergies.     Review of Systems   Constitutional: Negative for chills, diaphoresis, fatigue, fever and weight loss.   HENT: Negative for ear pain, postnasal drip, rhinorrhea and sore throat.    Eyes: Negative for photophobia and visual disturbance.   Respiratory: Positive for cough. Negative for hemoptysis, chest tightness, shortness of breath and wheezing.         Dry cough     Cardiovascular: Positive for leg swelling. Negative for chest pain and palpitations.        Bilateral ankle swelling     Gastrointestinal: Negative for abdominal pain, bowel incontinence and heartburn.   Genitourinary: Negative  for bladder incontinence, dysuria and pelvic pain.   Musculoskeletal: Positive for back pain. Negative for myalgias.   Skin: Negative for rash.   Allergic/Immunologic: Negative for environmental allergies.   Neurological: Negative for tingling, weakness, numbness, headaches and paresthesias.   Psychiatric/Behavioral: Positive for sleep disturbance. The patient is nervous/anxious.         Insomnia takes Remeron for tremors used to make him sleepy but not any more           Objective:      Physical Exam   Constitutional: He is oriented to person, place, and time. He appears well-developed and well-nourished. No distress.   HENT:   Right Ear: External ear normal.   Left Ear: External ear normal.   Cardiovascular: Normal rate, regular rhythm and normal heart sounds.    Pulmonary/Chest: Effort normal and breath sounds normal. No respiratory distress. He has no wheezes.   Musculoskeletal: Normal range of motion. He exhibits edema. He exhibits no tenderness or deformity.        Right ankle: He exhibits swelling.        Left ankle: He exhibits swelling.        Lumbar back: He exhibits pain and spasm.   Neurological: He is alert and oriented to person, place, and time.   Skin: Skin is warm and dry. No rash noted. He is not diaphoretic. No erythema. No pallor.   Psychiatric: His behavior is normal. Judgment and thought content normal. His mood appears anxious.   Vitals reviewed.      Assessment:       1. Essential hypertension    2. Acute bilateral low back pain without sciatica    3. Insomnia, unspecified type    4. Dry cough    5. Ankle edema    6. Anxiety        Plan:       Essential hypertension  -     amLODIPine (NORVASC) 10 MG tablet; Take 1 tablet (10 mg total) by mouth once daily.  Dispense: 30 tablet; Refill: 1    Acute bilateral low back pain without sciatica  -     hydrocodone-acetaminophen 5-325mg (NORCO) 5-325 mg per tablet; Take 1 tablet by mouth every 6 (six) hours as needed for Pain.  Dispense: 20 tablet;  Refill: 0    Insomnia, unspecified type  -     ALPRAZolam (XANAX) 0.25 MG tablet; Take 1 tablet (0.25 mg total) by mouth nightly as needed for Insomnia.  Dispense: 30 tablet; Refill: 0    Dry cough    Ankle edema    Anxiety      Elevated ankles  Patient is going to try the chiropractor if no relief call me will order MRI  Heat to back  Take norco and xanax with caution do not take the medicine together  Stop lisinopril start Norvasc monitor pressure at home

## 2018-01-24 ENCOUNTER — OFFICE VISIT (OUTPATIENT)
Dept: UROLOGY | Facility: CLINIC | Age: 68
End: 2018-01-24
Payer: MEDICARE

## 2018-01-24 VITALS
HEART RATE: 116 BPM | DIASTOLIC BLOOD PRESSURE: 58 MMHG | WEIGHT: 171.94 LBS | SYSTOLIC BLOOD PRESSURE: 126 MMHG | BODY MASS INDEX: 26.15 KG/M2

## 2018-01-24 DIAGNOSIS — N40.0 BENIGN PROSTATIC HYPERPLASIA, UNSPECIFIED WHETHER LOWER URINARY TRACT SYMPTOMS PRESENT: ICD-10-CM

## 2018-01-24 DIAGNOSIS — R35.1 NOCTURIA: ICD-10-CM

## 2018-01-24 DIAGNOSIS — M54.50 ACUTE RIGHT-SIDED LOW BACK PAIN WITHOUT SCIATICA: ICD-10-CM

## 2018-01-24 DIAGNOSIS — R33.9 URINARY RETENTION: Primary | ICD-10-CM

## 2018-01-24 PROCEDURE — 99214 OFFICE O/P EST MOD 30 MIN: CPT | Mod: 25,S$GLB,, | Performed by: PHYSICIAN ASSISTANT

## 2018-01-24 PROCEDURE — 51798 US URINE CAPACITY MEASURE: CPT | Mod: 59,S$GLB,, | Performed by: PHYSICIAN ASSISTANT

## 2018-01-24 PROCEDURE — 99999 PR PBB SHADOW E&M-EST. PATIENT-LVL III: CPT | Mod: PBBFAC,,, | Performed by: PHYSICIAN ASSISTANT

## 2018-01-24 PROCEDURE — 81002 URINALYSIS NONAUTO W/O SCOPE: CPT | Mod: S$GLB,,, | Performed by: PHYSICIAN ASSISTANT

## 2018-01-24 PROCEDURE — 51702 INSERT TEMP BLADDER CATH: CPT | Mod: S$GLB,,, | Performed by: PHYSICIAN ASSISTANT

## 2018-01-24 RX ORDER — LIDOCAINE HYDROCHLORIDE 20 MG/ML
JELLY TOPICAL ONCE
Status: CANCELLED | OUTPATIENT
Start: 2018-01-24 | End: 2018-01-24

## 2018-01-24 RX ORDER — DOXYCYCLINE HYCLATE 100 MG
100 TABLET ORAL ONCE
Status: CANCELLED | OUTPATIENT
Start: 2018-01-24 | End: 2018-01-24

## 2018-01-24 NOTE — PROGRESS NOTES
CHIEF COMPLAINT:    Mr. Messina is a 67 y.o. male presenting for incomplete bladder emptying.  PRESENTING ILLNESS:    Rodolfo Messina is a 67 y.o. male who presents for incomplete bladder emptying.    He was last seen on 1/15/18 for incomplete bladder emptying.  PvR at that visit was 328.  Patient did not want lackey catheter placement and did not think he would be able to do CIC due to his tremors.  He has been taking flomax 0.8mg without improvement.    He denies difficulty urinating but now is experiencing nocturia x 2-3.  This is new.  He reports still experiencing right sided back pain.  He has seen the chiropractor as recommended by his pcp but the pain has not resolved.  He states that pcp recommended a MRI as next step.    CT abd/pelvis 1/15/18 showed urinary bladder wall thickening, consistent with cystitis or chronic bladder outlet obstruction.  Kidneys demonstrate mild parenchymal loss and small cysts but no stone, solid mass, or obstruction.    REVIEW OF SYSTEMS:    Constitutional: Negative for fever and chills.   HENT: Negative for hearing loss.   Eyes: Negative for visual disturbance.   Respiratory: Negative for shortness of breath.   Cardiovascular: Negative for chest pain.   Gastrointestinal: Negative for vomiting, and constipation.   Genitourinary:  See HPI.   Neurological: Negative for dizziness.   Hematological: Does not bruise/bleed easily.   Psychiatric/Behavioral: Negative for confusion.        PATIENT HISTORY:    Past Medical History:   Diagnosis Date    Anxiety     Diverticulosis     Hypertension        Past Surgical History:   Procedure Laterality Date    APPENDECTOMY      COLONOSCOPY      EYE SURGERY Right     cataract extraction    FRACTURE SURGERY Right     index finger    TONSILLECTOMY         Family History   Problem Relation Age of Onset    Stroke Mother     Heart disease Mother      CABG    Heart disease Father      CABG    No Known Problems Sister     No Known Problems  Brother        Social History     Social History    Marital status:      Spouse name: N/A    Number of children: N/A    Years of education: N/A     Occupational History     [employer]     Social History Main Topics    Smoking status: Never Smoker    Smokeless tobacco: Never Used    Alcohol use Yes      Comment: Occasionally drink wine.     Drug use: No    Sexual activity: Not Currently     Partners: Female     Other Topics Concern    Not on file     Social History Narrative    No narrative on file       Allergies:  Ciprofloxacin; Flagyl [metronidazole]; Mysoline [primidone]; and Omnicef [cefdinir]    Medications:    Current Outpatient Prescriptions:     ALPRAZolam (XANAX) 0.25 MG tablet, Take 1 tablet (0.25 mg total) by mouth nightly as needed for Insomnia., Disp: 30 tablet, Rfl: 0    amLODIPine (NORVASC) 10 MG tablet, Take 1 tablet (10 mg total) by mouth once daily., Disp: 30 tablet, Rfl: 1    aspirin (ECOTRIN) 81 MG EC tablet, Take 81 mg by mouth once daily., Disp: , Rfl:     brompheniramine-pseudoeph-DM 2-30-10 mg/5 mL Syrp, , Disp: , Rfl:     CHERATUSSIN AC  mg/5 mL syrup, , Disp: , Rfl:     fluticasone (FLONASE) 50 mcg/actuation nasal spray, 1 spray by Each Nare route 2 (two) times daily., Disp: 1 Bottle, Rfl: 11    hydrochlorothiazide (HYDRODIURIL) 12.5 MG Tab, Take 12.5 mg by mouth every other day., Disp: , Rfl:     hydrocodone-acetaminophen 5-325mg (NORCO) 5-325 mg per tablet, Take 1 tablet by mouth every 6 (six) hours as needed for Pain., Disp: 20 tablet, Rfl: 0    ketorolac (TORADOL) 10 mg tablet, Take 1 tablet (10 mg total) by mouth every 6 (six) hours as needed for Pain., Disp: 15 tablet, Rfl: 0    loratadine (CLARITIN) 10 mg tablet, Take 10 mg by mouth once daily., Disp: , Rfl:     mirtazapine (REMERON) 30 MG tablet, TAKE 1 TABLET EVERY EVENING, Disp: 90 tablet, Rfl: 3    tamsulosin (FLOMAX) 0.4 mg Cp24, Take 0.4 mg by mouth once daily., Disp: , Rfl:     VENTOLIN HFA 90  mcg/actuation inhaler, , Disp: , Rfl:     PHYSICAL EXAMINATION:    Constitutional: He appears well-developed and well-nourished.  He is in no apparent distress.  Repeat /62      Eyes: No scleral icterus noted bilaterally. No discharge noted bilaterally.      Cardiovascular: Normal rate.  No pitting edema noted in lower extremities bilaterally    Pulmonary/Chest: Effort normal. No respiratory distress.     Abdominal:  He exhibits no distension.  There is no CVA tenderness.     Lymphadenopathy:        Right: No supraclavicular adenopathy present.        Left: No supraclavicular adenopathy present.     Neurological: He is alert and oriented to person, place, and time.     Skin: Skin is warm and dry.     Psych: Cooperative with normal affect.    Physical Exam    Bladder scan performed by Nurse Le.  PVR 448ml  Patient unable to void prior to scan.  LABS:      Lab Results   Component Value Date    PSADIAG 1.7 01/15/2018       IMPRESSION:    Encounter Diagnoses   Name Primary?    Urinary retention Yes    Nocturia     Benign prostatic hyperplasia, unspecified whether lower urinary tract symptoms present     Acute right-sided low back pain without sciatica          PLAN:  I spent 25 minutes with the patient of which more than half was spent in direct consultation with the patient in regards to our treatment and plan.    Discussed PVR.  Patient informed that his pvr has worsen.  Recommend that he either have lackey catheter placed or do CIC.  Patient agreed to have lackey catheter placed.   16fr lackey catheter was placed by Nurse Le.  10cc of sterile water used to inflate the balloon.  Catheter secured to leg bag.  Patient educated on lackey catheter care.  450ml of yellow urine drained from bladder upon catheter placement.   Continue flomax.  Will schedule urodynamics to further investigate urinary retention.  .    Follow up with pcp for back pain.      Follow up 7-10 days prior to urodynamics to check  urine.      Maria C Arceo PA-C

## 2018-01-26 ENCOUNTER — OFFICE VISIT (OUTPATIENT)
Dept: FAMILY MEDICINE | Facility: CLINIC | Age: 68
End: 2018-01-26
Payer: MEDICARE

## 2018-01-26 ENCOUNTER — TELEPHONE (OUTPATIENT)
Dept: FAMILY MEDICINE | Facility: CLINIC | Age: 68
End: 2018-01-26

## 2018-01-26 VITALS
HEIGHT: 68 IN | DIASTOLIC BLOOD PRESSURE: 68 MMHG | WEIGHT: 162.81 LBS | SYSTOLIC BLOOD PRESSURE: 108 MMHG | OXYGEN SATURATION: 97 % | HEART RATE: 119 BPM | BODY MASS INDEX: 24.68 KG/M2 | TEMPERATURE: 98 F

## 2018-01-26 DIAGNOSIS — D64.9 ANEMIA, UNSPECIFIED TYPE: Primary | ICD-10-CM

## 2018-01-26 DIAGNOSIS — M54.50 ACUTE BILATERAL LOW BACK PAIN WITHOUT SCIATICA: ICD-10-CM

## 2018-01-26 DIAGNOSIS — I10 ESSENTIAL HYPERTENSION: ICD-10-CM

## 2018-01-26 DIAGNOSIS — J04.0 LARYNGITIS: ICD-10-CM

## 2018-01-26 DIAGNOSIS — R68.89 FLU-LIKE SYMPTOMS: ICD-10-CM

## 2018-01-26 DIAGNOSIS — G47.00 INSOMNIA, UNSPECIFIED TYPE: ICD-10-CM

## 2018-01-26 DIAGNOSIS — R63.4 WEIGHT LOSS: ICD-10-CM

## 2018-01-26 DIAGNOSIS — M54.9 BACK PAIN, UNSPECIFIED BACK LOCATION, UNSPECIFIED BACK PAIN LATERALITY, UNSPECIFIED CHRONICITY: ICD-10-CM

## 2018-01-26 DIAGNOSIS — M25.473 ANKLE EDEMA: ICD-10-CM

## 2018-01-26 DIAGNOSIS — Z78.9 OTHER SPECIFIED HEALTH STATUS: ICD-10-CM

## 2018-01-26 PROCEDURE — 1125F AMNT PAIN NOTED PAIN PRSNT: CPT | Mod: S$GLB,,, | Performed by: FAMILY MEDICINE

## 2018-01-26 PROCEDURE — 99214 OFFICE O/P EST MOD 30 MIN: CPT | Mod: S$GLB,,, | Performed by: FAMILY MEDICINE

## 2018-01-26 PROCEDURE — 99499 UNLISTED E&M SERVICE: CPT | Mod: S$GLB,,, | Performed by: FAMILY MEDICINE

## 2018-01-26 PROCEDURE — 3008F BODY MASS INDEX DOCD: CPT | Mod: S$GLB,,, | Performed by: FAMILY MEDICINE

## 2018-01-26 PROCEDURE — 1159F MED LIST DOCD IN RCRD: CPT | Mod: S$GLB,,, | Performed by: FAMILY MEDICINE

## 2018-01-26 NOTE — TELEPHONE ENCOUNTER
----- Message from Denice Addison sent at 1/26/2018  1:11 PM CST -----  Contact: Mrs Messina [phone]  Patient would like to be seen today.  Was taken to the ER on yesterday. The ER told them not to see Dr LERNER.  Requesting to see Dr Claudio today.     Symptoms: Feels horrible    How long has patient had these symptoms: Since he saw Coleen    If unable to be seen , can something be called into patient pharmacy?    Pharmacy name: [facility]    Any drug allergies: no known allergies

## 2018-01-27 ENCOUNTER — HOSPITAL ENCOUNTER (OUTPATIENT)
Facility: HOSPITAL | Age: 68
Discharge: HOME | DRG: 812 | End: 2018-01-28
Attending: FAMILY MEDICINE | Admitting: HOSPITALIST
Payer: MEDICARE

## 2018-01-27 DIAGNOSIS — D64.9 ANEMIA: Primary | ICD-10-CM

## 2018-01-27 LAB
ABO + RH BLD: NORMAL
ALBUMIN SERPL BCP-MCNC: 2.3 G/DL
ALP SERPL-CCNC: 73 U/L
ALT SERPL W/O P-5'-P-CCNC: 27 U/L
ANION GAP SERPL CALC-SCNC: 10 MMOL/L
ANION GAP SERPL CALC-SCNC: 9 MMOL/L
AST SERPL-CCNC: 23 U/L
BACTERIA #/AREA URNS AUTO: NORMAL /HPF
BASOPHILS # BLD AUTO: 0.01 K/UL
BASOPHILS # BLD AUTO: 0.01 K/UL
BASOPHILS NFR BLD: 0.1 %
BASOPHILS NFR BLD: 0.1 %
BILIRUB DIRECT SERPL-MCNC: 0.3 MG/DL
BILIRUB SERPL-MCNC: 0.5 MG/DL
BILIRUB UR QL STRIP: NEGATIVE
BLD GP AB SCN CELLS X3 SERPL QL: NORMAL
BLD PROD TYP BPU: NORMAL
BLOOD UNIT EXPIRATION DATE: NORMAL
BLOOD UNIT TYPE CODE: 6200
BLOOD UNIT TYPE: NORMAL
BUN SERPL-MCNC: 13 MG/DL
BUN SERPL-MCNC: 15 MG/DL
CALCIUM SERPL-MCNC: 8.4 MG/DL
CALCIUM SERPL-MCNC: 8.8 MG/DL
CHLORIDE SERPL-SCNC: 100 MMOL/L
CHLORIDE SERPL-SCNC: 94 MMOL/L
CK SERPL-CCNC: 10 U/L
CLARITY UR REFRACT.AUTO: CLEAR
CO2 SERPL-SCNC: 22 MMOL/L
CO2 SERPL-SCNC: 22 MMOL/L
CODING SYSTEM: NORMAL
COLOR UR AUTO: YELLOW
CREAT SERPL-MCNC: 1.1 MG/DL
CREAT SERPL-MCNC: 1.2 MG/DL
DIFFERENTIAL METHOD: ABNORMAL
DIFFERENTIAL METHOD: ABNORMAL
DISPENSE STATUS: NORMAL
EOSINOPHIL # BLD AUTO: 0.1 K/UL
EOSINOPHIL # BLD AUTO: 0.1 K/UL
EOSINOPHIL NFR BLD: 0.8 %
EOSINOPHIL NFR BLD: 1 %
ERYTHROCYTE [DISTWIDTH] IN BLOOD BY AUTOMATED COUNT: 13.2 %
ERYTHROCYTE [DISTWIDTH] IN BLOOD BY AUTOMATED COUNT: 14.2 %
EST. GFR  (AFRICAN AMERICAN): >60 ML/MIN/1.73 M^2
EST. GFR  (AFRICAN AMERICAN): >60 ML/MIN/1.73 M^2
EST. GFR  (NON AFRICAN AMERICAN): >60 ML/MIN/1.73 M^2
EST. GFR  (NON AFRICAN AMERICAN): >60 ML/MIN/1.73 M^2
ESTIMATED AVG GLUCOSE: 108 MG/DL
FERRITIN SERPL-MCNC: 550 NG/ML
FOLATE SERPL-MCNC: 8.9 NG/ML
GLUCOSE SERPL-MCNC: 109 MG/DL
GLUCOSE SERPL-MCNC: 121 MG/DL
GLUCOSE UR QL STRIP: NEGATIVE
HAPTOGLOB SERPL-MCNC: 187 MG/DL
HBA1C MFR BLD HPLC: 5.4 %
HCT VFR BLD AUTO: 21.2 %
HCT VFR BLD AUTO: 23 %
HGB BLD-MCNC: 7 G/DL
HGB BLD-MCNC: 7.7 G/DL
HGB UR QL STRIP: ABNORMAL
IMM GRANULOCYTES # BLD AUTO: 0.06 K/UL
IMM GRANULOCYTES # BLD AUTO: 0.07 K/UL
IMM GRANULOCYTES NFR BLD AUTO: 0.7 %
IMM GRANULOCYTES NFR BLD AUTO: 0.8 %
INR PPP: 1.1
IRON SERPL-MCNC: 12 UG/DL
KETONES UR QL STRIP: NEGATIVE
LDH SERPL L TO P-CCNC: 192 U/L
LEUKOCYTE ESTERASE UR QL STRIP: NEGATIVE
LYMPHOCYTES # BLD AUTO: 0.7 K/UL
LYMPHOCYTES # BLD AUTO: 0.7 K/UL
LYMPHOCYTES NFR BLD: 8.2 %
LYMPHOCYTES NFR BLD: 8.7 %
MCH RBC QN AUTO: 29.4 PG
MCH RBC QN AUTO: 29.8 PG
MCHC RBC AUTO-ENTMCNC: 33 G/DL
MCHC RBC AUTO-ENTMCNC: 33.5 G/DL
MCV RBC AUTO: 88 FL
MCV RBC AUTO: 90 FL
MICROSCOPIC COMMENT: NORMAL
MONOCYTES # BLD AUTO: 0.6 K/UL
MONOCYTES # BLD AUTO: 0.6 K/UL
MONOCYTES NFR BLD: 7.1 %
MONOCYTES NFR BLD: 7.4 %
NEUTROPHILS # BLD AUTO: 6.8 K/UL
NEUTROPHILS # BLD AUTO: 6.8 K/UL
NEUTROPHILS NFR BLD: 82.4 %
NEUTROPHILS NFR BLD: 82.7 %
NITRITE UR QL STRIP: NEGATIVE
NRBC BLD-RTO: 0 /100 WBC
NRBC BLD-RTO: 0 /100 WBC
PH UR STRIP: 5 [PH] (ref 5–8)
PLATELET # BLD AUTO: 207 K/UL
PLATELET # BLD AUTO: 220 K/UL
PMV BLD AUTO: 10 FL
PMV BLD AUTO: 9.9 FL
POTASSIUM SERPL-SCNC: 4.5 MMOL/L
POTASSIUM SERPL-SCNC: 4.7 MMOL/L
PROT SERPL-MCNC: 5.9 G/DL
PROT UR QL STRIP: NEGATIVE
PROTHROMBIN TIME: 11.5 SEC
RBC # BLD AUTO: 2.35 M/UL
RBC # BLD AUTO: 2.62 M/UL
RBC #/AREA URNS AUTO: 4 /HPF (ref 0–4)
RETICS/RBC NFR AUTO: 1.9 %
SATURATED IRON: 9 %
SODIUM SERPL-SCNC: 126 MMOL/L
SODIUM SERPL-SCNC: 131 MMOL/L
SP GR UR STRIP: 1 (ref 1–1.03)
SQUAMOUS #/AREA URNS AUTO: 1 /HPF
TOTAL IRON BINDING CAPACITY: 130 UG/DL
TRANS ERYTHROCYTES VOL PATIENT: NORMAL ML
TRANSFERRIN SERPL-MCNC: 88 MG/DL
URN SPEC COLLECT METH UR: ABNORMAL
UROBILINOGEN UR STRIP-ACNC: 2 EU/DL
WBC # BLD AUTO: 8.26 K/UL
WBC # BLD AUTO: 8.27 K/UL
WBC #/AREA URNS AUTO: 1 /HPF (ref 0–5)

## 2018-01-27 PROCEDURE — 25000003 PHARM REV CODE 250: Performed by: HOSPITALIST

## 2018-01-27 PROCEDURE — 25000003 PHARM REV CODE 250: Performed by: EMERGENCY MEDICINE

## 2018-01-27 PROCEDURE — G0378 HOSPITAL OBSERVATION PER HR: HCPCS

## 2018-01-27 PROCEDURE — 85610 PROTHROMBIN TIME: CPT

## 2018-01-27 PROCEDURE — 99285 EMERGENCY DEPT VISIT HI MDM: CPT | Mod: 25

## 2018-01-27 PROCEDURE — 36430 TRANSFUSION BLD/BLD COMPNT: CPT

## 2018-01-27 PROCEDURE — 86850 RBC ANTIBODY SCREEN: CPT

## 2018-01-27 PROCEDURE — 83615 LACTATE (LD) (LDH) ENZYME: CPT

## 2018-01-27 PROCEDURE — 25000003 PHARM REV CODE 250: Performed by: FAMILY MEDICINE

## 2018-01-27 PROCEDURE — 83540 ASSAY OF IRON: CPT

## 2018-01-27 PROCEDURE — 82550 ASSAY OF CK (CPK): CPT

## 2018-01-27 PROCEDURE — 85060 BLOOD SMEAR INTERPRETATION: CPT | Mod: ,,, | Performed by: PATHOLOGY

## 2018-01-27 PROCEDURE — 81001 URINALYSIS AUTO W/SCOPE: CPT

## 2018-01-27 PROCEDURE — 11000001 HC ACUTE MED/SURG PRIVATE ROOM

## 2018-01-27 PROCEDURE — 83010 ASSAY OF HAPTOGLOBIN QUANT: CPT

## 2018-01-27 PROCEDURE — 80048 BASIC METABOLIC PNL TOTAL CA: CPT | Mod: 91

## 2018-01-27 PROCEDURE — 82608 B-12 BINDING CAPACITY: CPT

## 2018-01-27 PROCEDURE — 82728 ASSAY OF FERRITIN: CPT

## 2018-01-27 PROCEDURE — P9021 RED BLOOD CELLS UNIT: HCPCS

## 2018-01-27 PROCEDURE — 36415 COLL VENOUS BLD VENIPUNCTURE: CPT

## 2018-01-27 PROCEDURE — 85045 AUTOMATED RETICULOCYTE COUNT: CPT

## 2018-01-27 PROCEDURE — 83036 HEMOGLOBIN GLYCOSYLATED A1C: CPT

## 2018-01-27 PROCEDURE — 93010 ELECTROCARDIOGRAM REPORT: CPT | Mod: ,,, | Performed by: INTERNAL MEDICINE

## 2018-01-27 PROCEDURE — 99285 EMERGENCY DEPT VISIT HI MDM: CPT | Mod: ,,, | Performed by: EMERGENCY MEDICINE

## 2018-01-27 PROCEDURE — 93005 ELECTROCARDIOGRAM TRACING: CPT

## 2018-01-27 PROCEDURE — 80048 BASIC METABOLIC PNL TOTAL CA: CPT

## 2018-01-27 PROCEDURE — 82746 ASSAY OF FOLIC ACID SERUM: CPT

## 2018-01-27 PROCEDURE — 85025 COMPLETE CBC W/AUTO DIFF WBC: CPT | Mod: 91

## 2018-01-27 PROCEDURE — 80076 HEPATIC FUNCTION PANEL: CPT

## 2018-01-27 PROCEDURE — 96360 HYDRATION IV INFUSION INIT: CPT

## 2018-01-27 PROCEDURE — 96361 HYDRATE IV INFUSION ADD-ON: CPT

## 2018-01-27 PROCEDURE — 99223 1ST HOSP IP/OBS HIGH 75: CPT | Mod: AI,,, | Performed by: HOSPITALIST

## 2018-01-27 PROCEDURE — 86920 COMPATIBILITY TEST SPIN: CPT

## 2018-01-27 RX ORDER — GLUCAGON 1 MG
1 KIT INJECTION
Status: DISCONTINUED | OUTPATIENT
Start: 2018-01-27 | End: 2018-01-28 | Stop reason: HOSPADM

## 2018-01-27 RX ORDER — TAMSULOSIN HYDROCHLORIDE 0.4 MG/1
0.8 CAPSULE ORAL DAILY
Status: DISCONTINUED | OUTPATIENT
Start: 2018-01-28 | End: 2018-01-28 | Stop reason: HOSPADM

## 2018-01-27 RX ORDER — HYDROCODONE BITARTRATE AND ACETAMINOPHEN 500; 5 MG/1; MG/1
TABLET ORAL
Status: DISCONTINUED | OUTPATIENT
Start: 2018-01-27 | End: 2018-01-28 | Stop reason: HOSPADM

## 2018-01-27 RX ORDER — MIRTAZAPINE 15 MG/1
30 TABLET, FILM COATED ORAL NIGHTLY
Status: DISCONTINUED | OUTPATIENT
Start: 2018-01-27 | End: 2018-01-28 | Stop reason: HOSPADM

## 2018-01-27 RX ORDER — ALPRAZOLAM 0.25 MG/1
0.25 TABLET ORAL NIGHTLY PRN
Status: DISCONTINUED | OUTPATIENT
Start: 2018-01-27 | End: 2018-01-28 | Stop reason: HOSPADM

## 2018-01-27 RX ORDER — TRAMADOL HYDROCHLORIDE 50 MG/1
50 TABLET ORAL
Status: COMPLETED | OUTPATIENT
Start: 2018-01-27 | End: 2018-01-27

## 2018-01-27 RX ORDER — SODIUM CHLORIDE 0.9 % (FLUSH) 0.9 %
3 SYRINGE (ML) INJECTION
Status: DISCONTINUED | OUTPATIENT
Start: 2018-01-27 | End: 2018-01-28 | Stop reason: HOSPADM

## 2018-01-27 RX ORDER — ORPHENADRINE CITRATE 100 MG/1
100 TABLET, EXTENDED RELEASE ORAL 2 TIMES DAILY
COMMUNITY
End: 2018-02-07 | Stop reason: ALTCHOICE

## 2018-01-27 RX ORDER — IBUPROFEN 200 MG
24 TABLET ORAL
Status: DISCONTINUED | OUTPATIENT
Start: 2018-01-27 | End: 2018-01-28 | Stop reason: HOSPADM

## 2018-01-27 RX ORDER — TRAMADOL HYDROCHLORIDE 50 MG/1
50 TABLET ORAL EVERY 6 HOURS PRN
Status: DISCONTINUED | OUTPATIENT
Start: 2018-01-27 | End: 2018-01-28 | Stop reason: HOSPADM

## 2018-01-27 RX ORDER — ORPHENADRINE CITRATE 100 MG/1
100 TABLET, EXTENDED RELEASE ORAL NIGHTLY
Status: DISCONTINUED | OUTPATIENT
Start: 2018-01-27 | End: 2018-01-28 | Stop reason: HOSPADM

## 2018-01-27 RX ORDER — TRAMADOL HYDROCHLORIDE 50 MG/1
50 TABLET ORAL EVERY 6 HOURS PRN
Status: ON HOLD | COMMUNITY
End: 2018-02-23 | Stop reason: HOSPADM

## 2018-01-27 RX ORDER — IBUPROFEN 200 MG
16 TABLET ORAL
Status: DISCONTINUED | OUTPATIENT
Start: 2018-01-27 | End: 2018-01-28 | Stop reason: HOSPADM

## 2018-01-27 RX ORDER — SODIUM CHLORIDE 0.9 % (FLUSH) 0.9 %
5 SYRINGE (ML) INJECTION
Status: DISCONTINUED | OUTPATIENT
Start: 2018-01-27 | End: 2018-01-28 | Stop reason: HOSPADM

## 2018-01-27 RX ORDER — PROMETHAZINE HYDROCHLORIDE AND CODEINE PHOSPHATE 6.25; 1 MG/5ML; MG/5ML
5 SOLUTION ORAL EVERY 6 HOURS PRN
Status: DISCONTINUED | OUTPATIENT
Start: 2018-01-27 | End: 2018-01-28 | Stop reason: HOSPADM

## 2018-01-27 RX ADMIN — SODIUM CHLORIDE 1000 ML: 0.9 INJECTION, SOLUTION INTRAVENOUS at 01:01

## 2018-01-27 RX ADMIN — PROMETHAZINE HYDROCHLORIDE AND CODEINE PHOSPHATE 5 ML: 10; 6.25 SOLUTION ORAL at 10:01

## 2018-01-27 RX ADMIN — ALPRAZOLAM 0.25 MG: 0.25 TABLET ORAL at 10:01

## 2018-01-27 RX ADMIN — TRAMADOL HYDROCHLORIDE 50 MG: 50 TABLET, FILM COATED ORAL at 06:01

## 2018-01-27 RX ADMIN — MIRTAZAPINE 30 MG: 15 TABLET, FILM COATED ORAL at 10:01

## 2018-01-27 RX ADMIN — TRAMADOL HYDROCHLORIDE 50 MG: 50 TABLET, COATED ORAL at 10:01

## 2018-01-27 NOTE — ED NOTES
Patient identifiers verified and correct for Mr Messina  C/C: Weakness   APPEARANCE: awake and alert in NAD.  SKIN: warm, dry and intact. No breakdown or bruising.  MUSCULOSKELETAL: Patient moving all extremities spontaneously, no obvious swelling or deformities noted. Ambulates independently.  RESPIRATORY: Denies shortness of breath.Respirations unlabored. Dry cough  CARDIAC: Denies CP, 2+ distal pulses; 1-2+ pedal  peripheral edema  ABDOMEN: S/ND/NT, Denies nausea  : Arrives with Lainez cath in place.   Neurologic: AAO x 4; follows commands equal strength in all extremities; denies numbness/tingling. Denies dizziness  Positive weakness, sattes hands shaking/tremors.

## 2018-01-27 NOTE — H&P
[facility] Hospital Medicine  History & Physical    Patient Name: Rodolfo Messina  MRN: [id number]  Admission Date: 1/27/2018  Attending Physician: Rudy Borjas MD  Primary Care Provider: Deric Claudio MD    [facility] Medicine Team: [facility] HOSP MED A Rudy Borjas MD     Patient information was obtained from patient and ER records.     Subjective:     Principal Problem:<principal problem not specified>    Chief Complaint:   Chief Complaint   Patient presents with    Fatigue     seen in clinic yest told may need transfusion, feeling weak, pale, has lackey cath        HPI: Mr. Messina is a 67-year-old white male with depression,anxiety,essential tremor, indwelling lackey secondary to urinary retention for 2wks,chronic back pain, and HTN who presents to the ED for a 3wk h/o of dry cough, right sided abd and flank pain after viral illness and 2d weakness,fatigue, SOB. Pt also w/ decreased appetite, and generalized malaise. He has also noted an 11-lb weight loss over this period of time. Patient notes that he had an upper respiratory infection in 1 wk before xmas and has not recovered since then. He has been seen in the hospital multiple times and went to his PCP yesterday who told him that he appeared ill and weak and to report to the hospital.     Patient denies chest pain, nausea, vomiting, chills, constipation, or diarrhea. He denies blood in his stool. +fevers and states temp 99 on several checks and has never been above 98 outside of acute illness.     States stool have been formed daily. Denies black, tarry, sticky stool but states yesterday stool seemed slightly darker than usual and foul smelling.     No history of malignancy.     Past Medical History:   Diagnosis Date    Anemia     Anxiety     BPH (benign prostatic hyperplasia)     Diverticulosis     Hypertension     Urinary retention        Past Surgical History:   Procedure Laterality Date    APPENDECTOMY      COLONOSCOPY       EYE SURGERY Right     cataract extraction    FRACTURE SURGERY Right     index finger    TONSILLECTOMY         Review of patient's allergies indicates:   Allergen Reactions    Ciprofloxacin     Flagyl [metronidazole]     Mysoline [primidone]     Omnicef [cefdinir]        No current facility-administered medications on file prior to encounter.      Current Outpatient Prescriptions on File Prior to Encounter   Medication Sig    ALPRAZolam (XANAX) 0.25 MG tablet Take 1 tablet (0.25 mg total) by mouth nightly as needed for Insomnia.    amLODIPine (NORVASC) 10 MG tablet Take 1 tablet (10 mg total) by mouth once daily.    aspirin (ECOTRIN) 81 MG EC tablet Take 81 mg by mouth once daily.    brompheniramine-pseudoeph-DM 2-30-10 mg/5 mL Syrp     CHERATUSSIN AC  mg/5 mL syrup Take 10 mLs by mouth every 6 (six) hours as needed.     fluticasone (FLONASE) 50 mcg/actuation nasal spray 1 spray by Each Nare route 2 (two) times daily.    hydrocodone-acetaminophen 5-325mg (NORCO) 5-325 mg per tablet Take 1 tablet by mouth every 6 (six) hours as needed for Pain.    loratadine (CLARITIN) 10 mg tablet Take 10 mg by mouth once daily.    mirtazapine (REMERON) 30 MG tablet TAKE 1 TABLET EVERY EVENING    tamsulosin (FLOMAX) 0.4 mg Cp24 Take 0.4 mg by mouth once daily.    VENTOLIN HFA 90 mcg/actuation inhaler     [DISCONTINUED] hydrochlorothiazide (HYDRODIURIL) 12.5 MG Tab Take 12.5 mg by mouth every other day.    [DISCONTINUED] ketorolac (TORADOL) 10 mg tablet Take 1 tablet (10 mg total) by mouth every 6 (six) hours as needed for Pain.     Family History     Problem Relation (Age of Onset)    Heart disease Mother, Father    No Known Problems Sister, Brother    Stroke Mother        Social History Main Topics    Smoking status: Never Smoker    Smokeless tobacco: Never Used    Alcohol use Yes      Comment: none recently    Drug use: No    Sexual activity: Not Currently     Partners: Female     Review of Systems    Constitutional: Positive for fatigue and unexpected weight change. Negative for chills. +fever.   HENT: Negative for congestion and sore throat.    Respiratory: Positive for cough and shortness of breath.    Cardiovascular: Negative for chest pain and palpitations.   Gastrointestinal: Negative for abdominal distention, blood in stool, constipation, diarrhea, nausea and vomiting.   Genitourinary: Positive for decreased urine volume and difficulty urinating. Negative for dysuria and hematuria. Pt w/o hesitancy, nocturia, or polyuria prior to acute retention  Musculoskeletal: Positive for back pain. Negative for myalgias.   Skin: Positive for pallor. Negative for rash.   Neurological: Positive for tremors and weakness. Negative for dizziness and light-headedness.   Hematological: Does not bruise/bleed easily.   Psychiatric/Behavioral: Negative for agitation and confusion.   All other systems reviewed and are negative.  Objective:     Vital Signs (Most Recent):  Temp: 98.1 °F (36.7 °C) (01/27/18 1510)  Pulse: 101 (01/27/18 1510)  Resp: 13 (01/27/18 1510)  BP: (!) 144/67 (01/27/18 1510)  SpO2: 99 % (01/27/18 1510) Vital Signs (24h Range):  Temp:  [98.1 °F (36.7 °C)-99.2 °F (37.3 °C)] 98.1 °F (36.7 °C)  Pulse:  [101-111] 101  Resp:  [13-19] 13  SpO2:  [99 %] 99 %  BP: (128-144)/(58-67) 144/67     Weight: 73.5 kg (162 lb)  Body mass index is 23.92 kg/m².    Physical Exam    Vitals reviewed.  Constitutional: He appears well-developed and well-nourished.   Thin, pale, ill-appearing   HENT:   Head: Normocephalic and atraumatic.   Eyes: EOM are normal. Pupils are equal, round, and reactive to light. Right eye exhibits no discharge. Left eye exhibits no discharge. No scleral icterus.   Conjunctival pallor   Neck: Normal range of motion. Neck supple. Erythema anterior neck extending b/l, wife states is normal. Firm cartilage anterior to thyroid that is shifted slightly to L of midline.   Cardiovascular: Regular rhythm and  normal heart sounds.   tachycardiac   Pulmonary/Chest: Breath sounds normal. No respiratory distress. He exhibits no tenderness.   Abdominal: Soft. Bowel sounds are normal.   Genitourinary: Rectal exam shows guaiac positive stool per ED  Musculoskeletal: Normal range of motion. He exhibits no edema or tenderness.   Lymphadenopathy:     He has no cervical adenopathy.   Neurological: He is alert and oriented to person, place, and time.   Skin: Skin is warm and dry. There is pallor. Dec cap refill  Psychiatric: He has a normal mood and affect.     Significant Labs: All pertinent labs within the past 24 hours have been reviewed.    Significant Imaging: I have reviewed and interpreted all pertinent imaging results/findings within the past 24 hours.    Assessment/Plan:     Active Diagnoses:    Diagnosis Date Noted POA    Anemia [D64.9] 01/27/2018 Yes      Problems Resolved During this Admission:    Diagnosis Date Noted Date Resolved POA     VTE Risk Mitigation     None        Anemia:  Picture is very curious. E/o chronic process w/ low Fe and high ferritin. But hgb drop from 9.3 on 1/14 to 7 today seems to rapid for ACID (and pt w/o Dx to fit this pattern). Takes APAP 650mg daily for aches and pain, recently back pain. Denies other NSAIDs. Possible lo  w grade GI ulceration causing anemia. Ferritin could be up from acute inflammation related to URI but 600 maybe too high. HCV negative. HIV ordered. Suspicion for malignancy: no tob hx and CXR clear, UA w/ occult blood but few RBCs, CT a/p w/ thickening of bladder wall and pt set ot f/u w/ uro for void trial 2/14 and cystoscopy 2/22.   -Called uro and asked about moving appointment up. Acute urinary retention now w/ chronic indwelling lackey  -Will discuss w/ GI timing of scope, may be able to set up out-pt w/in next week if labs and vitals are stable. Last c-scope ~6-7y ago and showed ticulosis and pt told to f/u 10y, sx not c/w losis but could be cause. Never EGD.  -B12,  FA pending. LDH and hapto nl. Retic 1.9 so possible marrow process or missing substrate. Ferritin >500 w/ low Fe.   -hold asa and no DVT ppx    Hyponatremia:  Likely hypovolemic from poor intake and based on exam. Given NS in ED and blood running now.   -repeat BMP 9pm and daily    Essential tremor continue remeron    Low back pain: likely MSK process from coughing, xanaflex and home tramadol resumed. Avoid APAP, NSAIDs    HTN: hold norvasc 10, HCTZ 12.5 every other day    Full code    Rudy Borjas MD  Department of Hospital Medicine   [facility]

## 2018-01-27 NOTE — ED TRIAGE NOTES
Patient states he had resp infection week before Christmas, Dry cough since Christmas, seen in ED for same 2 weeks ago. Went to [facility] Thursday, states his H and H lower. Went to PCP yesterday, told to come to ED if not feeling better. Has lackey cath placed Tuesday with clear urine this morning. Currently states concern with weakness and dry cough x 1 month. Using cough medication and inhaler, Prilosec. ALso continues with c/o back pain.

## 2018-01-27 NOTE — PROVIDER PROGRESS NOTES - EMERGENCY DEPT.
Encounter Date: 1/27/2018    ED Physician Progress Notes           Patient is 67-year-old male with history of diverticulosis who was seen in the clinic for complaints of generalized malaise, weakness and follow-up with urinary retention.  At that time.  Recent lab results indicated a significant fall in his hemoglobin from 12.5-9.2.  Was referred to the emergency room today because of increasing weakness, fatigue and tachycardia.  He was triaged to the emergency room, but is been sent to intake to initiate his workup.  He is already tachycardic, so I'm not going to do orthostatic vital signs.  We will obtain appropriate labs to confirm his anemia, start IV fluids, type and screen, and referred to the main ED pods for further disposition, as originally triaged

## 2018-01-27 NOTE — ED PROVIDER NOTES
Encounter Date: 1/27/2018    SCRIBE #1 NOTE: I, Mat Singh, am scribing for, and in the presence of,  Dr. Payan . I have scribed the following portions of the note - the EKG reading.       History     Chief Complaint   Patient presents with    Fatigue     seen in clinic yest told may need transfusion, feeling weak, pale, has lackey cath     HPI   Mr. Messina is a 67-year-old white male with depression,anxiety,essential tremor, indwelling lackey secondary to urinary retention,chronic back pain, and HTN who presents to the ED for a 1-month history of weakness,fatigue, SOB,decreased appetite,dry cough, and generalized malaise. He has also noted an 11-lb weight loss over this period of time.Patient notes that he had an upper respiratory infection in December and has not recovered since then. He has been seen in the hospital multiple times and went to his PCP yesterday who told him that he appeared ill and weak and to report to the hospital.   Patient denies chest pain, nausea, vomiting, fevers, chills, constipation, or diarrhea. He denies blood in his stool.   No history of malignancy.     Review of patient's allergies indicates:   Allergen Reactions    Ciprofloxacin     Flagyl [metronidazole]     Mysoline [primidone]     Omnicef [cefdinir]      Past Medical History:   Diagnosis Date    Anemia     Anxiety     BPH (benign prostatic hyperplasia)     Diverticulosis     Hypertension     Urinary retention      Past Surgical History:   Procedure Laterality Date    APPENDECTOMY      COLONOSCOPY      EYE SURGERY Right     cataract extraction    FINGER SURGERY      FRACTURE SURGERY Right     index finger    TONSILLECTOMY       Family History   Problem Relation Age of Onset    Stroke Mother     Heart disease Mother      CABG    Heart disease Father      CABG    No Known Problems Sister     No Known Problems Brother      Social History   Substance Use Topics    Smoking status: Never Smoker    Smokeless  tobacco: Never Used    Alcohol use Yes      Comment: none recently     Review of Systems   Constitutional: Positive for fatigue and unexpected weight change. Negative for chills and fever.   HENT: Negative for congestion and sore throat.    Respiratory: Positive for cough and shortness of breath.    Cardiovascular: Negative for chest pain and palpitations.   Gastrointestinal: Negative for abdominal distention, blood in stool, constipation, diarrhea, nausea and vomiting.   Genitourinary: Positive for decreased urine volume and difficulty urinating. Negative for dysuria and hematuria.   Musculoskeletal: Positive for back pain. Negative for myalgias.   Skin: Positive for pallor. Negative for rash.   Neurological: Positive for tremors and weakness. Negative for dizziness and light-headedness.   Hematological: Does not bruise/bleed easily.   Psychiatric/Behavioral: Negative for agitation and confusion.   All other systems reviewed and are negative.      Physical Exam     Initial Vitals [01/27/18 1102]   BP Pulse Resp Temp SpO2   (!) 130/59 (!) 111 18 99.2 °F (37.3 °C) 99 %      MAP       82.67         Physical Exam    Vitals reviewed.  Constitutional: He appears well-developed and well-nourished.   Thin, pale, ill-appearing   HENT:   Head: Normocephalic and atraumatic.   Eyes: EOM are normal. Pupils are equal, round, and reactive to light. Right eye exhibits no discharge. Left eye exhibits no discharge. No scleral icterus.   Conjunctival pallor   Neck: Normal range of motion. Neck supple.   Cardiovascular: Regular rhythm and normal heart sounds.   tachycardiac   Pulmonary/Chest: Breath sounds normal. No respiratory distress. He exhibits no tenderness.   Abdominal: Soft. Bowel sounds are normal.   Genitourinary: Rectal exam shows guaiac positive stool. Guaiac positive stool. : Acceptable.  Musculoskeletal: Normal range of motion. He exhibits no edema or tenderness.   Lymphadenopathy:     He has no  cervical adenopathy.   Neurological: He is alert and oriented to person, place, and time.   Skin: Skin is warm and dry. There is pallor.   Psychiatric: He has a normal mood and affect.         ED Course   Procedures  Labs Reviewed   CBC W/ AUTO DIFFERENTIAL - Abnormal; Notable for the following:        Result Value    RBC 2.35 (*)     Hemoglobin 7.0 (*)     Hematocrit 21.2 (*)     Immature Granulocytes 0.8 (*)     Immature Grans (Abs) 0.07 (*)     Lymph # 0.7 (*)     Gran% 82.7 (*)     Lymph% 8.2 (*)     All other components within normal limits   BASIC METABOLIC PANEL - Abnormal; Notable for the following:     Sodium 126 (*)     Chloride 94 (*)     CO2 22 (*)     Glucose 121 (*)     Calcium 8.4 (*)     All other components within normal limits   URINALYSIS, REFLEX TO URINE CULTURE - Abnormal; Notable for the following:     Occult Blood UA 3+ (*)     All other components within normal limits    Narrative:     Preferred Collection Type->Urine, Clean Catch   FERRITIN - Abnormal; Notable for the following:     Ferritin 550 (*)     All other components within normal limits   IRON AND TIBC - Abnormal; Notable for the following:     Iron 12 (*)     Transferrin 88 (*)     TIBC 130 (*)     Saturated Iron 9 (*)     All other components within normal limits   CK - Abnormal; Notable for the following:     CPK 10 (*)     All other components within normal limits    Narrative:     Add on CPK and HEPFP order #[id number] per Rudy Borjas MD @    01/27/2018  16:08    HEPATIC FUNCTION PANEL - Abnormal; Notable for the following:     Total Protein 5.9 (*)     Albumin 2.3 (*)     All other components within normal limits    Narrative:     Add on CPK and HEPFP order #[id number] per Rudy Borjas MD @    01/27/2018  16:08    PROTIME-INR   URINALYSIS MICROSCOPIC    Narrative:     Preferred Collection Type->Urine, Clean Catch   HAPTOGLOBIN   LACTATE DEHYDROGENASE   FOLATE   RETICULOCYTES   CK   PATHOLOGIST INTERPRETATION DIFFERENTIAL    HEPATIC FUNCTION PANEL   PATHOLOGIST INTERPRETATION DIFFERENTIAL    Narrative:     ADD ON REVH PER DR. BREONNA SANCHEZ AT  01/27/2018  16:15 (USED CBC)   VITAMIN B-12 BINDING CAPACITY   CBC W/ AUTO DIFFERENTIAL   TYPE & SCREEN   PREPARE RBC SOFT     EKG Readings: (Independently Interpreted)   Sinus Tachycardia rate of 109 with no ST segments or T wave changes.           Medical Decision Making:   Initial Assessment:   Patient is pale and tachycardiac. He appears ill.   Independently Interpreted Test(s):   I have ordered and independently interpreted EKG Reading(s) - see prior notes  Clinical Tests:   Medical Tests: Ordered and Reviewed  ED Management:  CBC revealing of Hgb: 7, most recently 9.3 two-weeks ago. FOBT performed and positive. Patient consented and planned for transfusion of 1-unit pRBCs. Patient will be admitted for transfusion and further work-up of anemia.             Scribe Attestation:   Scribe #1: I performed the above scribed service and the documentation accurately describes the services I performed. I attest to the accuracy of the note.    Attending Attestation:   Physician Attestation Statement for Resident:  As the supervising MD   Physician Attestation Statement: I have personally seen and examined this patient.   I agree with the above history. -: 67-year-old man complains of weakness, fatigue, malaise, cough, and weight loss for the past 3-4 weeks.  His symptoms have been progressively worsening.   As the supervising MD I agree with the above PE.   -: Patient is pale.  Appears slightly short of breath.  Appears slightly uncomfortable.   As the supervising MD I agree with the above treatment, course, plan, and disposition.   -: Laboratory significant for hemoglobin of 7, sodium of 126.  Patient was consented for blood transfusion.  Hydrated with IV fluids.  He will be admitted to internal medicine to continue blood transfusion and for further evaluation of his anemia.  His stool is  heme-positive here however he has been heme-negative recently and this is likely not the only source of his anemia.                    ED Course      Clinical Impression:   The encounter diagnosis was Anemia.    Disposition:   Disposition: Admitted                        Vanessa Dudley MD  01/28/18 0848

## 2018-01-28 ENCOUNTER — TELEPHONE (OUTPATIENT)
Dept: GASTROENTEROLOGY | Facility: HOSPITAL | Age: 68
End: 2018-01-28

## 2018-01-28 VITALS
SYSTOLIC BLOOD PRESSURE: 141 MMHG | OXYGEN SATURATION: 94 % | HEIGHT: 69 IN | HEART RATE: 109 BPM | BODY MASS INDEX: 23.99 KG/M2 | DIASTOLIC BLOOD PRESSURE: 65 MMHG | TEMPERATURE: 101 F | WEIGHT: 162 LBS | RESPIRATION RATE: 18 BRPM

## 2018-01-28 DIAGNOSIS — D50.9 MICROCYTIC ANEMIA: Primary | ICD-10-CM

## 2018-01-28 LAB
ALBUMIN SERPL BCP-MCNC: 2.2 G/DL
ALBUMIN SERPL BCP-MCNC: 2.2 G/DL
ALP SERPL-CCNC: 67 U/L
ALP SERPL-CCNC: 67 U/L
ALT SERPL W/O P-5'-P-CCNC: 21 U/L
ALT SERPL W/O P-5'-P-CCNC: 21 U/L
ANION GAP SERPL CALC-SCNC: 9 MMOL/L
ANION GAP SERPL CALC-SCNC: 9 MMOL/L
AST SERPL-CCNC: 20 U/L
AST SERPL-CCNC: 20 U/L
BASOPHILS # BLD AUTO: 0.01 K/UL
BASOPHILS # BLD AUTO: 0.02 K/UL
BASOPHILS NFR BLD: 0.1 %
BASOPHILS NFR BLD: 0.2 %
BILIRUB SERPL-MCNC: 1.7 MG/DL
BILIRUB SERPL-MCNC: 1.7 MG/DL
BLD PROD TYP BPU: NORMAL
BLOOD UNIT EXPIRATION DATE: NORMAL
BLOOD UNIT TYPE CODE: 6200
BLOOD UNIT TYPE: NORMAL
BUN SERPL-MCNC: 13 MG/DL
BUN SERPL-MCNC: 13 MG/DL
CALCIUM SERPL-MCNC: 8.3 MG/DL
CALCIUM SERPL-MCNC: 8.3 MG/DL
CHLORIDE SERPL-SCNC: 102 MMOL/L
CHLORIDE SERPL-SCNC: 102 MMOL/L
CO2 SERPL-SCNC: 22 MMOL/L
CO2 SERPL-SCNC: 22 MMOL/L
CODING SYSTEM: NORMAL
CREAT SERPL-MCNC: 1.1 MG/DL
CREAT SERPL-MCNC: 1.1 MG/DL
DIFFERENTIAL METHOD: ABNORMAL
DIFFERENTIAL METHOD: ABNORMAL
DISPENSE STATUS: NORMAL
EOSINOPHIL # BLD AUTO: 0.1 K/UL
EOSINOPHIL # BLD AUTO: 0.1 K/UL
EOSINOPHIL NFR BLD: 1 %
EOSINOPHIL NFR BLD: 1.2 %
ERYTHROCYTE [DISTWIDTH] IN BLOOD BY AUTOMATED COUNT: 14 %
ERYTHROCYTE [DISTWIDTH] IN BLOOD BY AUTOMATED COUNT: 14.6 %
EST. GFR  (AFRICAN AMERICAN): >60 ML/MIN/1.73 M^2
EST. GFR  (AFRICAN AMERICAN): >60 ML/MIN/1.73 M^2
EST. GFR  (NON AFRICAN AMERICAN): >60 ML/MIN/1.73 M^2
EST. GFR  (NON AFRICAN AMERICAN): >60 ML/MIN/1.73 M^2
GLUCOSE SERPL-MCNC: 91 MG/DL
GLUCOSE SERPL-MCNC: 91 MG/DL
HCT VFR BLD AUTO: 20.5 %
HCT VFR BLD AUTO: 25.3 %
HGB BLD-MCNC: 7.1 G/DL
HGB BLD-MCNC: 8.6 G/DL
IMM GRANULOCYTES # BLD AUTO: 0.05 K/UL
IMM GRANULOCYTES # BLD AUTO: 0.05 K/UL
IMM GRANULOCYTES NFR BLD AUTO: 0.6 %
IMM GRANULOCYTES NFR BLD AUTO: 0.7 %
LYMPHOCYTES # BLD AUTO: 0.6 K/UL
LYMPHOCYTES # BLD AUTO: 0.8 K/UL
LYMPHOCYTES NFR BLD: 9.2 %
LYMPHOCYTES NFR BLD: 9.8 %
MAGNESIUM SERPL-MCNC: 1.9 MG/DL
MCH RBC QN AUTO: 30 PG
MCH RBC QN AUTO: 30.7 PG
MCHC RBC AUTO-ENTMCNC: 34 G/DL
MCHC RBC AUTO-ENTMCNC: 34.6 G/DL
MCV RBC AUTO: 88 FL
MCV RBC AUTO: 89 FL
MONOCYTES # BLD AUTO: 0.6 K/UL
MONOCYTES # BLD AUTO: 0.7 K/UL
MONOCYTES NFR BLD: 7.8 %
MONOCYTES NFR BLD: 8.7 %
NEUTROPHILS # BLD AUTO: 5.5 K/UL
NEUTROPHILS # BLD AUTO: 6.8 K/UL
NEUTROPHILS NFR BLD: 80.3 %
NEUTROPHILS NFR BLD: 80.4 %
NRBC BLD-RTO: 0 /100 WBC
NRBC BLD-RTO: 0 /100 WBC
PLATELET # BLD AUTO: 186 K/UL
PLATELET # BLD AUTO: 216 K/UL
PMV BLD AUTO: 10.1 FL
PMV BLD AUTO: 10.1 FL
POTASSIUM SERPL-SCNC: 5 MMOL/L
POTASSIUM SERPL-SCNC: 5 MMOL/L
PROT SERPL-MCNC: 5.7 G/DL
PROT SERPL-MCNC: 5.7 G/DL
RBC # BLD AUTO: 2.31 M/UL
RBC # BLD AUTO: 2.87 M/UL
SODIUM SERPL-SCNC: 133 MMOL/L
SODIUM SERPL-SCNC: 133 MMOL/L
TRANS ERYTHROCYTES VOL PATIENT: NORMAL ML
TSH SERPL DL<=0.005 MIU/L-ACNC: 0.83 UIU/ML
WBC # BLD AUTO: 6.88 K/UL
WBC # BLD AUTO: 8.48 K/UL

## 2018-01-28 PROCEDURE — 36415 COLL VENOUS BLD VENIPUNCTURE: CPT

## 2018-01-28 PROCEDURE — 85025 COMPLETE CBC W/AUTO DIFF WBC: CPT

## 2018-01-28 PROCEDURE — G0378 HOSPITAL OBSERVATION PER HR: HCPCS

## 2018-01-28 PROCEDURE — 25000003 PHARM REV CODE 250: Performed by: HOSPITALIST

## 2018-01-28 PROCEDURE — 80053 COMPREHEN METABOLIC PANEL: CPT

## 2018-01-28 PROCEDURE — 86703 HIV-1/HIV-2 1 RESULT ANTBDY: CPT

## 2018-01-28 PROCEDURE — 99239 HOSP IP/OBS DSCHRG MGMT >30: CPT | Mod: ,,, | Performed by: HOSPITALIST

## 2018-01-28 PROCEDURE — P9021 RED BLOOD CELLS UNIT: HCPCS

## 2018-01-28 PROCEDURE — 84443 ASSAY THYROID STIM HORMONE: CPT

## 2018-01-28 PROCEDURE — 83735 ASSAY OF MAGNESIUM: CPT

## 2018-01-28 PROCEDURE — 36430 TRANSFUSION BLD/BLD COMPNT: CPT

## 2018-01-28 RX ORDER — PROMETHAZINE HYDROCHLORIDE AND CODEINE PHOSPHATE 6.25; 1 MG/5ML; MG/5ML
5 SOLUTION ORAL EVERY 6 HOURS PRN
Qty: 240 ML | Refills: 1 | Status: SHIPPED | OUTPATIENT
Start: 2018-01-28 | End: 2018-02-07

## 2018-01-28 RX ORDER — PANTOPRAZOLE SODIUM 40 MG/1
40 TABLET, DELAYED RELEASE ORAL DAILY
Qty: 30 TABLET | Refills: 11 | Status: SHIPPED | OUTPATIENT
Start: 2018-01-28 | End: 2018-02-07 | Stop reason: ALTCHOICE

## 2018-01-28 RX ADMIN — TAMSULOSIN HYDROCHLORIDE 0.8 MG: 0.4 CAPSULE ORAL at 08:01

## 2018-01-28 RX ADMIN — ORPHENADRINE CITRATE 100 MG: 100 TABLET, EXTENDED RELEASE ORAL at 02:01

## 2018-01-28 NOTE — TELEPHONE ENCOUNTER
Treatment Plan  01/28/2018  8:39 AM      Patient needs outpatient EGD and colon as outpatient within 7-10 days.    Lisandro Bettencourt M.D.  Gastroenterology Fellow, PGY-IV  Pager: [phone]  [facility]

## 2018-01-29 ENCOUNTER — TELEPHONE (OUTPATIENT)
Dept: FAMILY MEDICINE | Facility: CLINIC | Age: 68
End: 2018-01-29

## 2018-01-29 ENCOUNTER — TELEPHONE (OUTPATIENT)
Dept: ENDOSCOPY | Facility: HOSPITAL | Age: 68
End: 2018-01-29

## 2018-01-29 DIAGNOSIS — Z12.11 SPECIAL SCREENING FOR MALIGNANT NEOPLASMS, COLON: Primary | ICD-10-CM

## 2018-01-29 LAB
HIV 1+2 AB+HIV1 P24 AG SERPL QL IA: NEGATIVE
PATH REV BLD -IMP: NORMAL
PATH REV BLD -IMP: NORMAL

## 2018-01-29 RX ORDER — POLYETHYLENE GLYCOL 3350, SODIUM SULFATE ANHYDROUS, SODIUM BICARBONATE, SODIUM CHLORIDE, POTASSIUM CHLORIDE 236; 22.74; 6.74; 5.86; 2.97 G/4L; G/4L; G/4L; G/4L; G/4L
4 POWDER, FOR SOLUTION ORAL ONCE
Qty: 4000 ML | Refills: 0 | Status: SHIPPED | OUTPATIENT
Start: 2018-01-29 | End: 2018-01-29

## 2018-01-29 NOTE — DISCHARGE SUMMARY
[facility] Hospital Medicine  Discharge Summary      Patient Name: Rodolfo Messina  MRN: [id number]  Admission Date: 1/27/2018  Hospital Length of Stay: 1 days  Discharge Date and Time: 1/28/2018 10:19 AM  Attending Physician: No att. providers found   Discharging Provider: Rudy Borjas MD  Primary Care Provider: Deric Claudio MD    [facility] Medicine Team: [facility] Rudy Borjas MD    HPI: Mr. Messina is a 67-year-old white male with depression,anxiety,essential tremor, indwelling lackey secondary to urinary retention for 2wks,chronic back pain, and HTN who presents to the ED for a 3wk h/o of dry cough, right sided abd and flank pain after viral illness and 2d weakness,fatigue, SOB. Pt also w/ decreased appetite, and generalized malaise. He has also noted an 11-lb weight loss over this period of time. Patient notes that he had an upper respiratory infection in 1 wk before xmas and has not recovered since then. He has been seen in the hospital multiple times and went to his PCP yesterday who told him that he appeared ill and weak and to report to the hospital.      Patient denies chest pain, nausea, vomiting, chills, constipation, or diarrhea. He denies blood in his stool. +fevers and states temp 99 on several checks and has never been above 98 outside of acute illness.      States stool have been formed daily. Denies black, tarry, sticky stool but states yesterday stool seemed slightly darker than usual and foul smelling.      No history of malignancy.      Hospital Course: hgb responded to transfusion. Pt no longer w/ anemia sx but still w/ MSK R hip pain and cough. Febrile x2 ON but no new sx. Likely from hgb transfusion and possibly underlying cancer. No bleeding. Discussed staying another night to monitor for any further fevers/sx and to make sure hgb stable. Pt opted to go home and have labs completed in 1 wk near home.     Consults:     Final Active Diagnoses:    Diagnosis Date  Noted POA    PRINCIPAL PROBLEM:  Anemia [D64.9] 01/27/2018 Yes    Acute right-sided low back pain without sciatica [M54.5] 01/14/2018 Yes    Tremor [R25.1] 11/09/2014 Yes     Chronic    HTN (hypertension) [I10] 11/09/2014 Yes     Chronic      Problems Resolved During this Admission:    Diagnosis Date Noted Date Resolved POA      Discharged Condition: good    Disposition: Home or Self Care    Follow Up:    Patient Instructions:     Diet Adult Regular     Activity as tolerated     Notify your health care provider if you experience any of the following:  temperature >100.4     Notify your health care provider if you experience any of the following:  persistent dizziness, light-headedness, or visual disturbances       Medications:  Reconciled Home Medications:   Discharge Medication List as of 1/28/2018  9:54 AM      START taking these medications    Details   pantoprazole (PROTONIX) 40 MG tablet Take 1 tablet (40 mg total) by mouth once daily., Starting Sun 1/28/2018, Until Mon 1/28/2019, Print      promethazine-codeine 6.25-10 mg/5 ml (PHENERGAN WITH CODEINE) 6.25-10 mg/5 mL syrup Take 5 mLs by mouth every 6 (six) hours as needed for Cough., Starting Sun 1/28/2018, Until Wed 2/7/2018, Print         CONTINUE these medications which have NOT CHANGED    Details   ALPRAZolam (XANAX) 0.25 MG tablet Take 1 tablet (0.25 mg total) by mouth nightly as needed for Insomnia., Starting Fri 1/19/2018, Until Sun 2/18/2018, Print      amLODIPine (NORVASC) 10 MG tablet Take 1 tablet (10 mg total) by mouth once daily., Starting Fri 1/19/2018, Until Sat 1/19/2019, Normal      brompheniramine-pseudoeph-DM 2-30-10 mg/5 mL Syrp Starting Fri 12/29/2017, Historical Med      CHERATUSSIN AC  mg/5 mL syrup Take 10 mLs by mouth every 6 (six) hours as needed. , Starting Mon 1/8/2018, Historical Med      fluticasone (FLONASE) 50 mcg/actuation nasal spray 1 spray by Each Nare route 2 (two) times daily., Starting Thu 12/28/2017, Normal     "  loratadine (CLARITIN) 10 mg tablet Take 10 mg by mouth once daily., Until Discontinued, Historical Med      mirtazapine (REMERON) 30 MG tablet TAKE 1 TABLET EVERY EVENING, Normal      orphenadrine (NORFLEX) 100 mg tablet Take 100 mg by mouth 2 (two) times daily., Historical Med      tamsulosin (FLOMAX) 0.4 mg Cp24 Take 0.4 mg by mouth once daily., Historical Med      traMADol (ULTRAM) 50 mg tablet Take 50 mg by mouth every 6 (six) hours as needed for Pain., Historical Med      VENTOLIN HFA 90 mcg/actuation inhaler Starting Fri 12/29/2017, Historical Med         STOP taking these medications       aspirin (ECOTRIN) 81 MG EC tablet Comments:   Reason for Stopping:         hydrochlorothiazide (HYDRODIURIL) 12.5 MG Tab Comments:   Reason for Stopping:         hydrocodone-acetaminophen 5-325mg (NORCO) 5-325 mg per tablet Comments:   Reason for Stopping:         ketorolac (TORADOL) 10 mg tablet Comments:   Reason for Stopping:               Significant Diagnostic Studies: Labs: All labs within the past 24 hours have been reviewed    Pending Diagnostic Studies:     Procedure Component Value Units Date/Time    B-12 binding capacity [[id number]] Collected:  01/27/18 1427    Order Status:  Sent Lab Status:  In process Updated:  01/27/18 1440    Specimen:  Blood from Blood     HIV-1 and HIV-2 antibodies [[id number]] Collected:  01/28/18 0642    Order Status:  Sent Lab Status:  In process Updated:  01/28/18 0740    Specimen:  Blood from Blood         Indwelling Lines/Drains at time of discharge:   Lines/Drains/Airways     Drain                 Urethral Catheter 01/23/18 1523 16 Fr. 5 days            BP (!) 141/65 (BP Location: Left arm, Patient Position: Sitting)   Pulse 109   Temp (!) 101.3 °F (38.5 °C) (Oral)   Resp 18   Ht 5' 9" (1.753 m)   Wt 73.5 kg (162 lb)   SpO2 (!) 94%   BMI 23.92 kg/m²     Vitals reviewed.  Constitutional: He appears well-developed and well-nourished.   Thin, pale, appears more comfortable " today  HENT:   Head: Normocephalic and atraumatic.   Eyes: EOM are normal. Pupils are equal, round, and reactive to light. Right eye exhibits no discharge. Left eye exhibits no discharge. No scleral icterus.   Conjunctival pallor   Neck: Normal range of motion. Neck supple. Erythema anterior neck extending b/l, wife states is normal. Firm cartilage anterior to thyroid that is shifted slightly to L of midline.   Cardiovascular: Regular rhythm and normal heart sounds.   tachycardiac   Pulmonary/Chest: Breath sounds normal. No respiratory distress. He exhibits no tenderness.   Abdominal: Soft. Bowel sounds are normal.   Genitourinary: Rectal exam shows guaiac positive stool per ED  Musculoskeletal: Normal range of motion. He exhibits no edema or tenderness.   Lymphadenopathy:     He has no cervical adenopathy.   Neurological: He is alert and oriented to person, place, and time.   Skin: Skin is warm and dry. There is pallor. Dec cap refill  Psychiatric: He has a normal mood and affect.      Anemia:  Picture is very curious. E/o chronic process w/ low Fe and high ferritin. But hgb drop from 9.3 on 1/14 to 7 today seems to rapid for ACID (and pt w/o Dx to fit this pattern). Takes APAP 650mg daily for aches and pain, recently back pain. Denies other NSAIDs. Possible lo  w grade GI ulceration causing anemia. Ferritin could be up from acute inflammation related to URI but 600 maybe too high. HCV negative. HIV ordered. Suspicion for malignancy: no tob hx and CXR clear, UA w/ occult blood but few RBCs, CT a/p w/ thickening of bladder wall and pt set ot f/u w/ uro for void trial 2/14 and cystoscopy 2/22.   -Called uro and asked about moving appointment up. Acute urinary retention now w/ chronic indwelling lackey  -Will discuss w/ GI timing of scope, may be able to set up out-pt w/in next week if labs and vitals are stable. Last c-scope ~6-7y ago and showed ticulosis and pt told to f/u 10y, sx not c/w losis but could be cause.  Never EGD. Plan for outpt, fellow sent message to  to arrange Monday  -B12, FA nl. LDH and hapto nl. Retic 1.9 so possible marrow process or missing substrate. Ferritin >500 w/ low Fe.   -hold asa until after urology  Procedure    Hyponatremia:  Likely hypovolemic from poor intake and based on exam. Given NS in ED and blood. Improved     Essential tremor continue remeron     Low back pain: likely MSK process from coughing, xanaflex and home tramadol resumed. Avoid APAP, NSAIDs     HTN: hold norvasc 10, HCTZ 12.5 every other day    Post viral tussive syndrome  -codeine cough syrup      Time spent on the discharge of patient: >30 minutes  Patient was seen and examined on the date of discharge and determined to be suitable for discharge.         Rudy Borjas MD  Department of Hospital Medicine  [facility]

## 2018-01-29 NOTE — TELEPHONE ENCOUNTER
----- Message from Denice Addison sent at 1/29/2018 12:06 PM CST -----  Contact: The pt's wife  Took him to the ER at [facility] over the weekend.  He was given 2 units of blood there.  Requesting a call back to discuss and also requesting he be referred to Dr Daly so that he can possibly get test done sooner.  Please call Mrs Messina at [phone]

## 2018-02-01 ENCOUNTER — ANESTHESIA (OUTPATIENT)
Dept: ENDOSCOPY | Facility: HOSPITAL | Age: 68
End: 2018-02-01
Payer: MEDICARE

## 2018-02-01 ENCOUNTER — ANESTHESIA EVENT (OUTPATIENT)
Dept: ENDOSCOPY | Facility: HOSPITAL | Age: 68
End: 2018-02-01
Payer: MEDICARE

## 2018-02-01 ENCOUNTER — SURGERY (OUTPATIENT)
Age: 68
End: 2018-02-01

## 2018-02-01 ENCOUNTER — TELEPHONE (OUTPATIENT)
Dept: GASTROENTEROLOGY | Facility: CLINIC | Age: 68
End: 2018-02-01

## 2018-02-01 ENCOUNTER — HOSPITAL ENCOUNTER (OUTPATIENT)
Facility: HOSPITAL | Age: 68
Discharge: HOME | End: 2018-02-01
Attending: INTERNAL MEDICINE | Admitting: INTERNAL MEDICINE
Payer: MEDICARE

## 2018-02-01 VITALS
OXYGEN SATURATION: 95 % | TEMPERATURE: 99 F | RESPIRATION RATE: 22 BRPM | HEART RATE: 97 BPM | BODY MASS INDEX: 23.99 KG/M2 | HEIGHT: 69 IN | WEIGHT: 162 LBS | SYSTOLIC BLOOD PRESSURE: 123 MMHG | DIASTOLIC BLOOD PRESSURE: 59 MMHG

## 2018-02-01 DIAGNOSIS — D64.9 ANEMIA, UNSPECIFIED TYPE: Primary | ICD-10-CM

## 2018-02-01 DIAGNOSIS — Z12.11 COLON CANCER SCREENING: ICD-10-CM

## 2018-02-01 LAB — VIT B12 USB SERPL-MCNC: 1536 PG/ML (ref 800–2600)

## 2018-02-01 PROCEDURE — 88305 TISSUE EXAM BY PATHOLOGIST: CPT | Mod: 59 | Performed by: PATHOLOGY

## 2018-02-01 PROCEDURE — 43239 EGD BIOPSY SINGLE/MULTIPLE: CPT | Mod: 51,GC,, | Performed by: INTERNAL MEDICINE

## 2018-02-01 PROCEDURE — 45378 DIAGNOSTIC COLONOSCOPY: CPT | Performed by: INTERNAL MEDICINE

## 2018-02-01 PROCEDURE — 25000003 PHARM REV CODE 250: Performed by: INTERNAL MEDICINE

## 2018-02-01 PROCEDURE — 45378 DIAGNOSTIC COLONOSCOPY: CPT | Mod: GC,,, | Performed by: INTERNAL MEDICINE

## 2018-02-01 PROCEDURE — D9220A PRA ANESTHESIA: Mod: ANES,,, | Performed by: ANESTHESIOLOGY

## 2018-02-01 PROCEDURE — 37000009 HC ANESTHESIA EA ADD 15 MINS: Performed by: INTERNAL MEDICINE

## 2018-02-01 PROCEDURE — 63600175 PHARM REV CODE 636 W HCPCS: Performed by: NURSE ANESTHETIST, CERTIFIED REGISTERED

## 2018-02-01 PROCEDURE — 37000008 HC ANESTHESIA 1ST 15 MINUTES: Performed by: INTERNAL MEDICINE

## 2018-02-01 PROCEDURE — D9220A PRA ANESTHESIA: Mod: CRNA,,, | Performed by: NURSE ANESTHETIST, CERTIFIED REGISTERED

## 2018-02-01 PROCEDURE — 27201012 HC FORCEPS, HOT/COLD, DISP: Performed by: INTERNAL MEDICINE

## 2018-02-01 PROCEDURE — 43239 EGD BIOPSY SINGLE/MULTIPLE: CPT | Performed by: INTERNAL MEDICINE

## 2018-02-01 RX ORDER — LIDOCAINE HCL/PF 100 MG/5ML
SYRINGE (ML) INTRAVENOUS
Status: DISCONTINUED | OUTPATIENT
Start: 2018-02-01 | End: 2018-02-01

## 2018-02-01 RX ORDER — PROPOFOL 10 MG/ML
VIAL (ML) INTRAVENOUS CONTINUOUS PRN
Status: DISCONTINUED | OUTPATIENT
Start: 2018-02-01 | End: 2018-02-01

## 2018-02-01 RX ORDER — SODIUM CHLORIDE 9 MG/ML
INJECTION, SOLUTION INTRAVENOUS CONTINUOUS
Status: DISCONTINUED | OUTPATIENT
Start: 2018-02-01 | End: 2018-02-01 | Stop reason: HOSPADM

## 2018-02-01 RX ORDER — PROPOFOL 10 MG/ML
VIAL (ML) INTRAVENOUS
Status: DISCONTINUED | OUTPATIENT
Start: 2018-02-01 | End: 2018-02-01

## 2018-02-01 RX ADMIN — LIDOCAINE HYDROCHLORIDE 50 MG: 20 INJECTION, SOLUTION INTRAVENOUS at 02:02

## 2018-02-01 RX ADMIN — SODIUM CHLORIDE: 0.9 INJECTION, SOLUTION INTRAVENOUS at 01:02

## 2018-02-01 RX ADMIN — PROPOFOL 50 MG: 10 INJECTION, EMULSION INTRAVENOUS at 02:02

## 2018-02-01 RX ADMIN — PROPOFOL 200 MCG/KG/MIN: 10 INJECTION, EMULSION INTRAVENOUS at 02:02

## 2018-02-01 NOTE — ANESTHESIA POSTPROCEDURE EVALUATION
"Anesthesia Post Evaluation    Patient: Rodolfo Messina    Procedure(s) Performed: Procedure(s) (LRB):  ESOPHAGOGASTRODUODENOSCOPY (EGD) (N/A)  COLONOSCOPY (N/A)    Final Anesthesia Type: general  Patient location during evaluation: GI PACU  Patient participation: Yes- Able to Participate  Level of consciousness: awake and alert and oriented  Post-procedure vital signs: reviewed and stable  Pain management: adequate  Airway patency: patent  PONV status at discharge: No PONV  Anesthetic complications: no      Cardiovascular status: blood pressure returned to baseline  Respiratory status: unassisted, spontaneous ventilation and room air  Hydration status: euvolemic  Follow-up not needed.        Visit Vitals  BP (!) 115/55 (BP Location: Left arm, Patient Position: Lying)   Pulse 100   Temp 37.2 °C (99 °F) (Temporal)   Resp (!) 22   Ht 5' 9" (1.753 m)   Wt 73.5 kg (162 lb)   SpO2 (!) 93%   BMI 23.92 kg/m²       Pain/Siva Score: Pain Assessment Performed: Yes (2/1/2018  3:36 PM)  Presence of Pain: denies (2/1/2018  3:36 PM)  Siva Score: 10 (2/1/2018  3:36 PM)      "

## 2018-02-01 NOTE — INTERVAL H&P NOTE
The patient has been examined and the H&P has been reviewed:    There is no interval changes since last encounter.    EGD/Colonoscopy: Anemia  Sedation: GA  ASA: Per anesthesia  Mallampati: Per anesthesia    Endoscopy risks, benefits and alternative options discussed and understood by patient/family.          Active Hospital Problems    Diagnosis  POA    Colon cancer screening [Z12.11]  Not Applicable      Resolved Hospital Problems    Diagnosis Date Resolved POA   No resolved problems to display.

## 2018-02-01 NOTE — PROVATION PATIENT INSTRUCTIONS
Discharge Summary/Instructions after an Endoscopic Procedure  Patient Name: Rodolfo Messina  Patient MRN: [id number]  Patient YOB: 1950  Thursday, February 01, 2018  Richar Payan MD  RESTRICTIONS:  During your procedure today, you received medications for sedation.  These   medications may affect your judgment, balance and coordination.  Therefore,   for 24 hours, you have the following restrictions:   - DO NOT drive a car, operate machinery, make legal/financial decisions,   sign important papers or drink alcohol.    ACTIVITY:  The following day: return to full activity including work, except no heavy   lifting, straining or running for 3 days if polyps were removed.  DIET:  Eat and drink normally unless instructed otherwise.     TREATMENT FOR COMMON SIDE EFFECTS:  - Mild abdominal pain, belching, bloating or excessive gas: rest, eat   lightly and use a heating pad.  - Sore Throat: treat with throat lozenges and/or gargle with warm salt   water.  SYMPTOMS TO WATCH FOR AND REPORT TO YOUR PHYSICIAN:  1. Abdominal pain or bloating, other than gas cramps.  2. Chest pain.  3. Back pain.  4. Chills or fever occurring within 24 hours after the procedure.  5. Rectal bleeding, which would show as bright red, maroon, or black stools.   (A tablespoon of blood from the rectum is not serious, especially if   hemorrhoids are present.)  6. Vomiting.  7. Weakness or dizziness.  8. Because air was used during the procedure, expelling large amounts of air   from your rectum or belching is normal.  9. If a bowel prep was taken, you may not have a bowel movement for 1-3   days.  This is normal.  GO DIRECTLY TO THE NEAREST EMERGENCY ROOM IF YOU HAVE ANY OF THE FOLLOWING:      Difficulty breathing  Chills and/or fever over 101 F   Persistent vomiting and/or vomiting blood   Severe abdominal pain   Severe chest pain   Black, tarry stools   Bleeding- more than one tablespoon   Any other symptom or condition that you may feel  needs urgent attention  Your doctor recommends these additional instructions:  If any biopsies were taken, your doctor s clinic will contact you in 1 to 2   weeks with any results.  You have a contact number available for emergencies.  The signs and symptoms   of potential delayed complications were discussed with you.  You may return   to normal activities tomorrow.  Written discharge instructions were   provided to you.   You are being discharged to home.   Resume your previous diet.   Continue your present medications.   We are waiting for your pathology results.  For questions, problems or results please call your physician - Richar Payan MD at Work:  [phone].  [facility], EMERGENCY ROOM PHONE NUMBER: [phone]  IF A COMPLICATION OR EMERGENCY SITUATION ARISES AND YOU ARE UNABLE TO REACH   YOUR PHYSICIAN - GO DIRECTLY TO THE EMERGENCY ROOM.  Richar Payan MD  2/1/2018 3:14:12 PM  This report has been verified and signed electronically.

## 2018-02-01 NOTE — H&P (VIEW-ONLY)
[facility] Hospital Medicine  History & Physical    Patient Name: Rodolfo Messina  MRN: [id number]  Admission Date: 1/27/2018  Attending Physician: Rudy Borjas MD  Primary Care Provider: Deric Claudio MD    [facility] Medicine Team: [facility] A Rudy Borjas MD     Patient information was obtained from patient and ER records.     Subjective:     Principal Problem:<principal problem not specified>    Chief Complaint:   Chief Complaint   Patient presents with    Fatigue     seen in clinic yest told may need transfusion, feeling weak, pale, has lackey cath        HPI: Mr. Messina is a 67-year-old white male with depression,anxiety,essential tremor, indwelling lackey secondary to urinary retention for 2wks,chronic back pain, and HTN who presents to the ED for a 3wk h/o of dry cough, right sided abd and flank pain after viral illness and 2d weakness,fatigue, SOB. Pt also w/ decreased appetite, and generalized malaise. He has also noted an 11-lb weight loss over this period of time. Patient notes that he had an upper respiratory infection in 1 wk before xmas and has not recovered since then. He has been seen in the hospital multiple times and went to his PCP yesterday who told him that he appeared ill and weak and to report to the hospital.     Patient denies chest pain, nausea, vomiting, chills, constipation, or diarrhea. He denies blood in his stool. +fevers and states temp 99 on several checks and has never been above 98 outside of acute illness.     States stool have been formed daily. Denies black, tarry, sticky stool but states yesterday stool seemed slightly darker than usual and foul smelling.     No history of malignancy.     Past Medical History:   Diagnosis Date    Anemia     Anxiety     BPH (benign prostatic hyperplasia)     Diverticulosis     Hypertension     Urinary retention        Past Surgical History:   Procedure Laterality Date    APPENDECTOMY      COLONOSCOPY       EYE SURGERY Right     cataract extraction    FRACTURE SURGERY Right     index finger    TONSILLECTOMY         Review of patient's allergies indicates:   Allergen Reactions    Ciprofloxacin     Flagyl [metronidazole]     Mysoline [primidone]     Omnicef [cefdinir]        No current facility-administered medications on file prior to encounter.      Current Outpatient Prescriptions on File Prior to Encounter   Medication Sig    ALPRAZolam (XANAX) 0.25 MG tablet Take 1 tablet (0.25 mg total) by mouth nightly as needed for Insomnia.    amLODIPine (NORVASC) 10 MG tablet Take 1 tablet (10 mg total) by mouth once daily.    aspirin (ECOTRIN) 81 MG EC tablet Take 81 mg by mouth once daily.    brompheniramine-pseudoeph-DM 2-30-10 mg/5 mL Syrp     CHERATUSSIN AC  mg/5 mL syrup Take 10 mLs by mouth every 6 (six) hours as needed.     fluticasone (FLONASE) 50 mcg/actuation nasal spray 1 spray by Each Nare route 2 (two) times daily.    hydrocodone-acetaminophen 5-325mg (NORCO) 5-325 mg per tablet Take 1 tablet by mouth every 6 (six) hours as needed for Pain.    loratadine (CLARITIN) 10 mg tablet Take 10 mg by mouth once daily.    mirtazapine (REMERON) 30 MG tablet TAKE 1 TABLET EVERY EVENING    tamsulosin (FLOMAX) 0.4 mg Cp24 Take 0.4 mg by mouth once daily.    VENTOLIN HFA 90 mcg/actuation inhaler     [DISCONTINUED] hydrochlorothiazide (HYDRODIURIL) 12.5 MG Tab Take 12.5 mg by mouth every other day.    [DISCONTINUED] ketorolac (TORADOL) 10 mg tablet Take 1 tablet (10 mg total) by mouth every 6 (six) hours as needed for Pain.     Family History     Problem Relation (Age of Onset)    Heart disease Mother, Father    No Known Problems Sister, Brother    Stroke Mother        Social History Main Topics    Smoking status: Never Smoker    Smokeless tobacco: Never Used    Alcohol use Yes      Comment: none recently    Drug use: No    Sexual activity: Not Currently     Partners: Female     Review of Systems    Constitutional: Positive for fatigue and unexpected weight change. Negative for chills. +fever.   HENT: Negative for congestion and sore throat.    Respiratory: Positive for cough and shortness of breath.    Cardiovascular: Negative for chest pain and palpitations.   Gastrointestinal: Negative for abdominal distention, blood in stool, constipation, diarrhea, nausea and vomiting.   Genitourinary: Positive for decreased urine volume and difficulty urinating. Negative for dysuria and hematuria. Pt w/o hesitancy, nocturia, or polyuria prior to acute retention  Musculoskeletal: Positive for back pain. Negative for myalgias.   Skin: Positive for pallor. Negative for rash.   Neurological: Positive for tremors and weakness. Negative for dizziness and light-headedness.   Hematological: Does not bruise/bleed easily.   Psychiatric/Behavioral: Negative for agitation and confusion.   All other systems reviewed and are negative.  Objective:     Vital Signs (Most Recent):  Temp: 98.1 °F (36.7 °C) (01/27/18 1510)  Pulse: 101 (01/27/18 1510)  Resp: 13 (01/27/18 1510)  BP: (!) 144/67 (01/27/18 1510)  SpO2: 99 % (01/27/18 1510) Vital Signs (24h Range):  Temp:  [98.1 °F (36.7 °C)-99.2 °F (37.3 °C)] 98.1 °F (36.7 °C)  Pulse:  [101-111] 101  Resp:  [13-19] 13  SpO2:  [99 %] 99 %  BP: (128-144)/(58-67) 144/67     Weight: 73.5 kg (162 lb)  Body mass index is 23.92 kg/m².    Physical Exam    Vitals reviewed.  Constitutional: He appears well-developed and well-nourished.   Thin, pale, ill-appearing   HENT:   Head: Normocephalic and atraumatic.   Eyes: EOM are normal. Pupils are equal, round, and reactive to light. Right eye exhibits no discharge. Left eye exhibits no discharge. No scleral icterus.   Conjunctival pallor   Neck: Normal range of motion. Neck supple. Erythema anterior neck extending b/l, wife states is normal. Firm cartilage anterior to thyroid that is shifted slightly to L of midline.   Cardiovascular: Regular rhythm and  normal heart sounds.   tachycardiac   Pulmonary/Chest: Breath sounds normal. No respiratory distress. He exhibits no tenderness.   Abdominal: Soft. Bowel sounds are normal.   Genitourinary: Rectal exam shows guaiac positive stool per ED  Musculoskeletal: Normal range of motion. He exhibits no edema or tenderness.   Lymphadenopathy:     He has no cervical adenopathy.   Neurological: He is alert and oriented to person, place, and time.   Skin: Skin is warm and dry. There is pallor. Dec cap refill  Psychiatric: He has a normal mood and affect.     Significant Labs: All pertinent labs within the past 24 hours have been reviewed.    Significant Imaging: I have reviewed and interpreted all pertinent imaging results/findings within the past 24 hours.    Assessment/Plan:     Active Diagnoses:    Diagnosis Date Noted POA    Anemia [D64.9] 01/27/2018 Yes      Problems Resolved During this Admission:    Diagnosis Date Noted Date Resolved POA     VTE Risk Mitigation     None        Anemia:  Picture is very curious. E/o chronic process w/ low Fe and high ferritin. But hgb drop from 9.3 on 1/14 to 7 today seems to rapid for ACID (and pt w/o Dx to fit this pattern). Takes APAP 650mg daily for aches and pain, recently back pain. Denies other NSAIDs. Possible lo  w grade GI ulceration causing anemia. Ferritin could be up from acute inflammation related to URI but 600 maybe too high. HCV negative. HIV ordered. Suspicion for malignancy: no tob hx and CXR clear, UA w/ occult blood but few RBCs, CT a/p w/ thickening of bladder wall and pt set ot f/u w/ uro for void trial 2/14 and cystoscopy 2/22.   -Called uro and asked about moving appointment up. Acute urinary retention now w/ chronic indwelling lackey  -Will discuss w/ GI timing of scope, may be able to set up out-pt w/in next week if labs and vitals are stable. Last c-scope ~6-7y ago and showed ticulosis and pt told to f/u 10y, sx not c/w losis but could be cause. Never EGD.  -B12,  FA pending. LDH and hapto nl. Retic 1.9 so possible marrow process or missing substrate. Ferritin >500 w/ low Fe.   -hold asa and no DVT ppx    Hyponatremia:  Likely hypovolemic from poor intake and based on exam. Given NS in ED and blood running now.   -repeat BMP 9pm and daily    Essential tremor continue remeron    Low back pain: likely MSK process from coughing, xanaflex and home tramadol resumed. Avoid APAP, NSAIDs    HTN: hold norvasc 10, HCTZ 12.5 every other day    Full code    Rudy Borjas MD  Department of Hospital Medicine   [facility]

## 2018-02-01 NOTE — TELEPHONE ENCOUNTER
----- Message from Richar Payan MD sent at 2/1/2018  3:38 PM CST -----  Please schedule a follow up with .

## 2018-02-01 NOTE — TRANSFER OF CARE
"Anesthesia Transfer of Care Note    Patient: Rodolfo Messina    Procedure(s) Performed: Procedure(s) (LRB):  ESOPHAGOGASTRODUODENOSCOPY (EGD) (N/A)  COLONOSCOPY (N/A)    Patient location: PACU    Anesthesia Type: general    Transport from OR: Transported from OR on room air with adequate spontaneous ventilation    Post pain: adequate analgesia    Post assessment: no apparent anesthetic complications and tolerated procedure well    Post vital signs: stable    Level of consciousness: awake    Nausea/Vomiting: no nausea/vomiting    Complications: none    Transfer of care protocol was followed      Last vitals:   Visit Vitals  BP (!) 143/73 (Patient Position: Lying)   Pulse 100   Temp 36.7 °C (98 °F) (Temporal)   Resp 16   Ht 5' 9" (1.753 m)   Wt 73.5 kg (162 lb)   SpO2 98%   BMI 23.92 kg/m²     "

## 2018-02-01 NOTE — ANESTHESIA PREPROCEDURE EVALUATION
02/01/2018  Rodolfo Messina is a 67 y.o., male.    Anesthesia Evaluation         Review of Systems  Anesthesia Hx:  No problems with previous Anesthesia   Cardiovascular:   Exercise tolerance: good Hypertension    Pulmonary:  Pulmonary Normal    Renal/:  Renal/ Normal     Hepatic/GI:   Bowel Prep.    Neurological:  Neurology Normal    Endocrine:  Endocrine Normal        Physical Exam  General:  Well nourished    Airway/Jaw/Neck:  Airway Findings: Mouth Opening: Normal General Airway Assessment: Adult  Mallampati: II  TM Distance: Normal, at least 6 cm                 Anesthesia Plan  Type of Anesthesia, risks & benefits discussed:  Anesthesia Type:  general  Patient's Preference:   Intra-op Monitoring Plan: standard ASA monitors  Intra-op Monitoring Plan Comments:   Post Op Pain Control Plan: IV/PO Opioids PRN  Post Op Pain Control Plan Comments:   Induction:   IV  Beta Blocker:  Patient is not currently on a Beta-Blocker (No further documentation required).       Informed Consent: Patient understands risks and agrees with Anesthesia plan.  Questions answered. Anesthesia consent signed with patient.  ASA Score: 2     Day of Surgery Review of History & Physical:    H&P update referred to the provider.         Ready For Surgery From Anesthesia Perspective.

## 2018-02-01 NOTE — PROVATION PATIENT INSTRUCTIONS
Discharge Summary/Instructions after an Endoscopic Procedure  Patient Name: Rodolfo Messina  Patient MRN: [id number]  Patient YOB: 1950  Thursday, February 01, 2018  Richar Payan MD  RESTRICTIONS:  During your procedure today, you received medications for sedation.  These   medications may affect your judgment, balance and coordination.  Therefore,   for 24 hours, you have the following restrictions:   - DO NOT drive a car, operate machinery, make legal/financial decisions,   sign important papers or drink alcohol.    ACTIVITY:  The following day: return to full activity including work, except no heavy   lifting, straining or running for 3 days if polyps were removed.  DIET:  Eat and drink normally unless instructed otherwise.     TREATMENT FOR COMMON SIDE EFFECTS:  - Mild abdominal pain, belching, bloating or excessive gas: rest, eat   lightly and use a heating pad.  - Sore Throat: treat with throat lozenges and/or gargle with warm salt   water.  SYMPTOMS TO WATCH FOR AND REPORT TO YOUR PHYSICIAN:  1. Abdominal pain or bloating, other than gas cramps.  2. Chest pain.  3. Back pain.  4. Chills or fever occurring within 24 hours after the procedure.  5. Rectal bleeding, which would show as bright red, maroon, or black stools.   (A tablespoon of blood from the rectum is not serious, especially if   hemorrhoids are present.)  6. Vomiting.  7. Weakness or dizziness.  8. Because air was used during the procedure, expelling large amounts of air   from your rectum or belching is normal.  9. If a bowel prep was taken, you may not have a bowel movement for 1-3   days.  This is normal.  GO DIRECTLY TO THE NEAREST EMERGENCY ROOM IF YOU HAVE ANY OF THE FOLLOWING:      Difficulty breathing  Chills and/or fever over 101 F   Persistent vomiting and/or vomiting blood   Severe abdominal pain   Severe chest pain   Black, tarry stools   Bleeding- more than one tablespoon   Any other symptom or condition that you may feel  needs urgent attention  Your doctor recommends these additional instructions:  If any biopsies were taken, your doctor s clinic will contact you in 1 to 2   weeks with any results.  You are being discharged to home.   You have a contact number available for emergencies.  The signs and symptoms   of potential delayed complications were discussed with you.  You may return   to normal activities tomorrow.  Written discharge instructions were   provided to you.   Resume your previous diet.   Continue your present medications.   We are waiting for your pathology results.   Your physician has recommended a repeat colonoscopy in 10 years for   screening purposes.  For questions, problems or results please call your physician - Richar Payan MD at Work:  [phone].  [facility], EMERGENCY ROOM PHONE NUMBER: [phone]  IF A COMPLICATION OR EMERGENCY SITUATION ARISES AND YOU ARE UNABLE TO REACH   YOUR PHYSICIAN - GO DIRECTLY TO THE EMERGENCY ROOM.  Richar Payan MD  2/1/2018 3:33:32 PM  This report has been verified and signed electronically.

## 2018-02-02 ENCOUNTER — TELEPHONE (OUTPATIENT)
Dept: FAMILY MEDICINE | Facility: CLINIC | Age: 68
End: 2018-02-02

## 2018-02-02 DIAGNOSIS — I10 ESSENTIAL HYPERTENSION: ICD-10-CM

## 2018-02-02 DIAGNOSIS — D64.9 ANEMIA, UNSPECIFIED TYPE: Primary | ICD-10-CM

## 2018-02-02 DIAGNOSIS — M25.473 ANKLE EDEMA: ICD-10-CM

## 2018-02-02 NOTE — TELEPHONE ENCOUNTER
----- Message from Denice Addison sent at 2/2/2018  1:36 PM CST -----  Contact: The pt's wife  The pt did the blood work and so far everything looks ok... Does Dr Claudio want to recheck it or do anything different? Still not sure where the bleeding is coming from.  Please advise.      I spoke with the pt wife - I advised dr claudio will review the results and we will get back with her Monday if not this weekend with an update  Pt last cbc was 6 days ago, when to recheck again

## 2018-02-04 NOTE — PROGRESS NOTES
Patient ID: Rodolfo Messina is a 67 y.o. male.    Chief Complaint: Cough; Back Pain; Sinusitis; Urinary Retention; Shortness of Breath; Tremors; and Hospital Follow Up    HPI       Rodolfo Messina is a 67 y.o. male here complains of cough that he has been having for several weeks.  Dating back to December.  Patient with dry cough not necessarily associated with nasal congestion postnasal drip or reflux.  Cough is very irritating.  Patient with recent visit to the emergency room at [facility] in [place] no definitive cause of cough or problems found.  Patient with recent visit to [facility] main campus for urinary retention.  Catheter placed. -Has appointment with urology     Patient with increased fatigue.  Decreased energy.  Decreased appetite      Review of Symptoms    Constitutional  significant, No chills/ fever   Resp  Neg hemoptysis, stridor, choking  CVS  Neg chest pain, palpitations    Physical Exam    Constitutional:   Oriented to person, place, and time.appears ill and as if he had weight loss.    HENT:   Head: Normocephalic and atraumatic.     Right Ear: Tympanic membrane, external ear and ear canal normal     Left Ear: Tympanic membrane, external ear and ear canal normal    Nose: External Normal. Normal turbinates, No rhinorrhea     Mouth/Throat: Uvula is midline, oropharynx is clear and moist and mucous membranes are normal.     Neck: supple no anterior cervical adenopathy    Carotid artery:  Bruit    NEG []   POS[]    Eyes:   Conjunctivae are normal. Right eye exhibits no discharge. Left eye exhibits no discharge. No scleral icterus. No periorbital edema       Cardiovascular:  Regular rate, Regular rhythm     No extrasystole  Normal S1-S2    Pulmonary/Chest:   Normal effort and breath sounds.  No wheezing, rhonchi or rales.      Musculoskeletal:  No edema. No obvious deformity No wasting     Neurological:   Alert and oriented to person, place, and time. Coordination normal.     Skin:   Skin  is warm and dry.   No diaphoresis.   No rash noted.    Psychiatric: Normal mood and affect. Behavior is normal. Judgment and thought content normal.       Assessment / Plan:      ICD-10-CM ICD-9-CM   1. Anemia, unspecified type D64.9 285.9   2. Back pain, unspecified back location, unspecified back pain laterality, unspecified chronicity M54.9 724.5   3. Essential hypertension I10 401.9   4. Acute bilateral low back pain without sciatica M54.5 724.2     338.19   5. Insomnia, unspecified type G47.00 780.52   6. Flu-like symptoms R68.89 780.99   7. Laryngitis J04.0 464.00   8. Ankle edema M25.473 719.07   9. Weight loss R63.4 783.21   10. Other specified health status  Z78.9 V49.89     Anemia, unspecified type  -     Comprehensive metabolic panel; Future  -     CBC auto differential; Future  -     Reticulocytes; Future  -     Iron and TIBC; Future  -     Sedimentation rate, manual; Future; Expected date: 01/26/2018  -     High sensitivity CRP (Cardiac CRP); Future; Expected date: 01/26/2018  -     Lactate dehydrogenase; Future  -     HAPTOGLOBIN; Future; Expected date: 01/26/2018    Back pain, unspecified back location, unspecified back pain laterality, unspecified chronicity  -     Comprehensive metabolic panel; Future  -     CBC auto differential; Future  -     Reticulocytes; Future  -     Iron and TIBC; Future  -     Sedimentation rate, manual; Future; Expected date: 01/26/2018  -     High sensitivity CRP (Cardiac CRP); Future; Expected date: 01/26/2018  -     Lactate dehydrogenase; Future  -     HAPTOGLOBIN; Future; Expected date: 01/26/2018    Essential hypertension  -     Comprehensive metabolic panel; Future  -     CBC auto differential; Future  -     Reticulocytes; Future  -     Iron and TIBC; Future  -     Sedimentation rate, manual; Future; Expected date: 01/26/2018  -     High sensitivity CRP (Cardiac CRP); Future; Expected date: 01/26/2018  -     Lactate dehydrogenase; Future  -     HAPTOGLOBIN; Future;  Expected date: 01/26/2018    Acute bilateral low back pain without sciatica  -     Comprehensive metabolic panel; Future  -     CBC auto differential; Future  -     Reticulocytes; Future  -     Iron and TIBC; Future  -     Sedimentation rate, manual; Future; Expected date: 01/26/2018  -     High sensitivity CRP (Cardiac CRP); Future; Expected date: 01/26/2018  -     Lactate dehydrogenase; Future  -     HAPTOGLOBIN; Future; Expected date: 01/26/2018    Insomnia, unspecified type  -     Comprehensive metabolic panel; Future  -     CBC auto differential; Future  -     Reticulocytes; Future  -     Iron and TIBC; Future  -     Sedimentation rate, manual; Future; Expected date: 01/26/2018  -     High sensitivity CRP (Cardiac CRP); Future; Expected date: 01/26/2018  -     Lactate dehydrogenase; Future  -     HAPTOGLOBIN; Future; Expected date: 01/26/2018    Flu-like symptoms  -     Comprehensive metabolic panel; Future  -     CBC auto differential; Future  -     Reticulocytes; Future  -     Iron and TIBC; Future  -     Sedimentation rate, manual; Future; Expected date: 01/26/2018  -     High sensitivity CRP (Cardiac CRP); Future; Expected date: 01/26/2018  -     Lactate dehydrogenase; Future  -     HAPTOGLOBIN; Future; Expected date: 01/26/2018    Laryngitis  -     Comprehensive metabolic panel; Future  -     CBC auto differential; Future  -     Reticulocytes; Future  -     Iron and TIBC; Future  -     Sedimentation rate, manual; Future; Expected date: 01/26/2018  -     High sensitivity CRP (Cardiac CRP); Future; Expected date: 01/26/2018  -     Lactate dehydrogenase; Future  -     HAPTOGLOBIN; Future; Expected date: 01/26/2018    Ankle edema  -     Comprehensive metabolic panel; Future  -     CBC auto differential; Future  -     Reticulocytes; Future  -     Iron and TIBC; Future  -     Sedimentation rate, manual; Future; Expected date: 01/26/2018  -     High sensitivity CRP (Cardiac CRP); Future; Expected date:  01/26/2018  -     Lactate dehydrogenase; Future  -     HAPTOGLOBIN; Future; Expected date: 01/26/2018    Weight loss  -     Comprehensive metabolic panel; Future  -     CBC auto differential; Future  -     Reticulocytes; Future  -     Iron and TIBC; Future  -     Sedimentation rate, manual; Future; Expected date: 01/26/2018  -     High sensitivity CRP (Cardiac CRP); Future; Expected date: 01/26/2018  -     Lactate dehydrogenase; Future  -     HAPTOGLOBIN; Future; Expected date: 01/26/2018    Other specified health status   -     High sensitivity CRP (Cardiac CRP); Future; Expected date: 01/26/2018

## 2018-02-06 ENCOUNTER — LAB VISIT (OUTPATIENT)
Dept: LAB | Facility: HOSPITAL | Age: 68
End: 2018-02-06
Attending: FAMILY MEDICINE
Payer: MEDICARE

## 2018-02-06 DIAGNOSIS — D64.9 ANEMIA, UNSPECIFIED TYPE: ICD-10-CM

## 2018-02-06 DIAGNOSIS — M25.473 ANKLE EDEMA: ICD-10-CM

## 2018-02-06 DIAGNOSIS — I10 ESSENTIAL HYPERTENSION: ICD-10-CM

## 2018-02-06 LAB
ALBUMIN SERPL BCP-MCNC: 3.2 G/DL
ALP SERPL-CCNC: 68 U/L
ALT SERPL W/O P-5'-P-CCNC: 25 U/L
ANION GAP SERPL CALC-SCNC: 9 MMOL/L
AST SERPL-CCNC: 22 U/L
BASOPHILS # BLD AUTO: 0.01 K/UL
BASOPHILS NFR BLD: 0.2 %
BILIRUB SERPL-MCNC: 0.4 MG/DL
BUN SERPL-MCNC: 14 MG/DL
CALCIUM SERPL-MCNC: 8.9 MG/DL
CHLORIDE SERPL-SCNC: 101 MMOL/L
CO2 SERPL-SCNC: 27 MMOL/L
CREAT SERPL-MCNC: 1.14 MG/DL
DIFFERENTIAL METHOD: ABNORMAL
EOSINOPHIL # BLD AUTO: 0.2 K/UL
EOSINOPHIL NFR BLD: 2.6 %
ERYTHROCYTE [DISTWIDTH] IN BLOOD BY AUTOMATED COUNT: 13.3 %
EST. GFR  (AFRICAN AMERICAN): >60 ML/MIN/1.73 M^2
EST. GFR  (NON AFRICAN AMERICAN): >60 ML/MIN/1.73 M^2
GLUCOSE SERPL-MCNC: 136 MG/DL
HCT VFR BLD AUTO: 28.4 %
HGB BLD-MCNC: 8.8 G/DL
LYMPHOCYTES # BLD AUTO: 0.7 K/UL
LYMPHOCYTES NFR BLD: 10.5 %
MCH RBC QN AUTO: 28.5 PG
MCHC RBC AUTO-ENTMCNC: 31 G/DL
MCV RBC AUTO: 92 FL
MONOCYTES # BLD AUTO: 0.5 K/UL
MONOCYTES NFR BLD: 6.9 %
NEUTROPHILS # BLD AUTO: 5.2 K/UL
NEUTROPHILS NFR BLD: 79.6 %
PLATELET # BLD AUTO: 221 K/UL
PMV BLD AUTO: 10.1 FL
POTASSIUM SERPL-SCNC: 4.3 MMOL/L
PROT SERPL-MCNC: 6.8 G/DL
RBC # BLD AUTO: 3.09 M/UL
SODIUM SERPL-SCNC: 137 MMOL/L
WBC # BLD AUTO: 6.55 K/UL

## 2018-02-06 PROCEDURE — 85025 COMPLETE CBC W/AUTO DIFF WBC: CPT | Mod: PO

## 2018-02-06 PROCEDURE — 36415 COLL VENOUS BLD VENIPUNCTURE: CPT | Mod: PO

## 2018-02-06 PROCEDURE — 80053 COMPREHEN METABOLIC PANEL: CPT | Mod: PO

## 2018-02-07 ENCOUNTER — OFFICE VISIT (OUTPATIENT)
Dept: FAMILY MEDICINE | Facility: CLINIC | Age: 68
End: 2018-02-07
Payer: MEDICARE

## 2018-02-07 ENCOUNTER — TELEPHONE (OUTPATIENT)
Dept: GASTROENTEROLOGY | Facility: CLINIC | Age: 68
End: 2018-02-07

## 2018-02-07 VITALS
DIASTOLIC BLOOD PRESSURE: 70 MMHG | HEART RATE: 119 BPM | HEIGHT: 69 IN | WEIGHT: 159.63 LBS | OXYGEN SATURATION: 98 % | TEMPERATURE: 99 F | SYSTOLIC BLOOD PRESSURE: 122 MMHG | BODY MASS INDEX: 23.64 KG/M2

## 2018-02-07 DIAGNOSIS — B34.9 VIRAL SYNDROME: Primary | ICD-10-CM

## 2018-02-07 PROCEDURE — 1126F AMNT PAIN NOTED NONE PRSNT: CPT | Mod: S$GLB,,, | Performed by: FAMILY MEDICINE

## 2018-02-07 PROCEDURE — 99213 OFFICE O/P EST LOW 20 MIN: CPT | Mod: S$GLB,,, | Performed by: FAMILY MEDICINE

## 2018-02-07 PROCEDURE — 1159F MED LIST DOCD IN RCRD: CPT | Mod: S$GLB,,, | Performed by: FAMILY MEDICINE

## 2018-02-07 PROCEDURE — 3008F BODY MASS INDEX DOCD: CPT | Mod: S$GLB,,, | Performed by: FAMILY MEDICINE

## 2018-02-07 RX ORDER — KETOROLAC TROMETHAMINE 10 MG/1
TABLET, FILM COATED ORAL
Status: ON HOLD | COMMUNITY
Start: 2018-01-15 | End: 2018-02-23 | Stop reason: HOSPADM

## 2018-02-08 ENCOUNTER — TELEPHONE (OUTPATIENT)
Dept: ENDOSCOPY | Facility: HOSPITAL | Age: 68
End: 2018-02-08

## 2018-02-13 RX ORDER — TAMSULOSIN HYDROCHLORIDE 0.4 MG/1
0.4 CAPSULE ORAL DAILY
Qty: 90 CAPSULE | Refills: 3 | Status: SHIPPED | OUTPATIENT
Start: 2018-02-13 | End: 2018-02-19 | Stop reason: SDUPTHER

## 2018-02-14 ENCOUNTER — OFFICE VISIT (OUTPATIENT)
Dept: UROLOGY | Facility: CLINIC | Age: 68
End: 2018-02-14
Payer: MEDICARE

## 2018-02-14 ENCOUNTER — TELEPHONE (OUTPATIENT)
Dept: FAMILY MEDICINE | Facility: CLINIC | Age: 68
End: 2018-02-14

## 2018-02-14 VITALS — DIASTOLIC BLOOD PRESSURE: 65 MMHG | SYSTOLIC BLOOD PRESSURE: 123 MMHG | HEART RATE: 118 BPM

## 2018-02-14 DIAGNOSIS — D64.9 ANEMIA, UNSPECIFIED TYPE: ICD-10-CM

## 2018-02-14 DIAGNOSIS — R05.8 DRY COUGH: ICD-10-CM

## 2018-02-14 DIAGNOSIS — R05.3 CHRONIC COUGH: Primary | ICD-10-CM

## 2018-02-14 DIAGNOSIS — I10 ESSENTIAL HYPERTENSION: ICD-10-CM

## 2018-02-14 DIAGNOSIS — Z46.6 URINARY CATHETER (FOLEY) CHANGE REQUIRED: Primary | ICD-10-CM

## 2018-02-14 DIAGNOSIS — M25.473 ANKLE EDEMA: ICD-10-CM

## 2018-02-14 DIAGNOSIS — R33.9 URINARY RETENTION: ICD-10-CM

## 2018-02-14 PROCEDURE — 87077 CULTURE AEROBIC IDENTIFY: CPT

## 2018-02-14 PROCEDURE — 51702 INSERT TEMP BLADDER CATH: CPT | Mod: S$GLB,,, | Performed by: PHYSICIAN ASSISTANT

## 2018-02-14 PROCEDURE — 99999 PR PBB SHADOW E&M-EST. PATIENT-LVL III: CPT | Mod: PBBFAC,,, | Performed by: PHYSICIAN ASSISTANT

## 2018-02-14 PROCEDURE — 87086 URINE CULTURE/COLONY COUNT: CPT

## 2018-02-14 PROCEDURE — 99213 OFFICE O/P EST LOW 20 MIN: CPT | Mod: 25,S$GLB,, | Performed by: PHYSICIAN ASSISTANT

## 2018-02-14 PROCEDURE — 87088 URINE BACTERIA CULTURE: CPT

## 2018-02-14 PROCEDURE — 3008F BODY MASS INDEX DOCD: CPT | Mod: S$GLB,,, | Performed by: PHYSICIAN ASSISTANT

## 2018-02-14 PROCEDURE — 87186 SC STD MICRODIL/AGAR DIL: CPT

## 2018-02-14 PROCEDURE — 1159F MED LIST DOCD IN RCRD: CPT | Mod: S$GLB,,, | Performed by: PHYSICIAN ASSISTANT

## 2018-02-14 NOTE — PROGRESS NOTES
Patient ID: Rodolfo Messina is a 67 y.o. male.    Chief Complaint: Follow-up (discuss lab results) and Fever    HPI      Rodolfo Messina is a 67 y.o. male patient with recent illness lasting over 5 Weeks you.  To be a viral Syndrome  D-dimer weight loss fatigue anorexia.  Also anemia with follow-up.  Continues to.  Has some nasal Congestion however unsure if this is a cause of coughing.  No reflux symptoms.            Review of Symptoms    Constitutional  Neg activity change, No chills /fever   Resp  Neg hemoptysis, stridor, choking  CVS  Neg chest pain, palpitations    Physical Exam    Constitutional:  Oriented to person, place, and time.appears well-developed and well-nourished.  No distress.      HENT  Head: Normocephalic and atraumatic  Right Ear: External ear normal.   Left Ear: External ear normal.   Nose: External nose normal.   Mouth:  Moist mucus membranes.    Eyes:  Conjunctivae are normal. Right eye exhibits no discharge.  Left eye exhibits no discharge. No scleral icterus.  No periorbital edema    Cardiovascular:  Regular rate, Regular rhythm     No extrasystole  Normal S1-S2      Pulmonary/Chest:   Normal effort and breath sounds.  No wheezing, rhonchi or rales.    Musculoskeletal:  No edema. No obvious deformity No wasting       Neurological:  Alert and oriented to person, place, and time.   Coordination normal.     Skin:   Skin is warm and dry.  No diaphoresis.   No rash noted.     Psychiatric: Normal mood and affect. Behavior is normal.  Judgment and thought content normal.       Assessment / Plan:      ICD-10-CM ICD-9-CM   1. Viral syndrome B34.9 079.99     Viral syndrome    Other orders  -     tamsulosin (FLOMAX) 0.4 mg Cp24; Take 1 capsule (0.4 mg total) by mouth once daily.      Discussed rest fluids-repeat lab work  -no definitive cause found.  Continued follow up  closely

## 2018-02-14 NOTE — TELEPHONE ENCOUNTER
----- Message from Monse Agosto sent at 2/14/2018  9:41 AM CST -----  Patient wife requesting blood work orders to check patient's blood levels. Wants orders to go to [facility]    Last cbc was 2/6/18

## 2018-02-14 NOTE — PROGRESS NOTES
CHIEF COMPLAINT:    Mr. Messina is a 67 y.o. male presenting for incomplete bladder emptying.  PRESENTING ILLNESS:    Rodolfo Messina is a 67 y.o. male with a PMH of urinary retention who presents for incomplete bladder emptying.  He presented on 1/24/2018 for nocturia 2-3.  He denied difficulty urinating. Patient agreed to have lackey catheter placed in office.  Catheter was placed and 450ml of urine was drained from bladder.  He denies gross hematuria.  His catheter has been draining well.     He was seen on 1/15/18 for incomplete bladder emptying.  PVR at that visit was 328.  Patient did not want lackey catheter placement and did not think he would be able to do CIC due to his tremors.  He has been taking flomax 0.8mg without improvement.    He has been experiencing weight loss and loss of appetite.  He reports requiring 2L of blood at the beginning of this month.  No known source of anemia has been found yet. He had a EGD and colonoscopy to evaluate.  Patient reports it was negative.    He has a cough since December 2017.  They plan to see pulmonology for this.    His back pain has resolved.       CT abd/pelvis 1/15/18 showed urinary bladder wall thickening, consistent with cystitis or chronic bladder outlet obstruction.  Kidneys demonstrate mild parenchymal loss and small cysts but no stone, solid mass, or obstruction.    REVIEW OF SYSTEMS:    Constitutional: Positive for night sweats and fever.     HENT: Negative for hearing loss.   Eyes: Negative for visual disturbance.   Respiratory: Positive for shortness of breath with coughing   Cardiovascular: Negative for chest pain.   Gastrointestinal: Negative for vomiting, and constipation.   Genitourinary:  See HPI  Neurological: Negative for dizziness.   Hematological: Does not bruise/bleed easily.   Psychiatric/Behavioral: Negative for confusion.       PATIENT HISTORY:    Past Medical History:   Diagnosis Date    Anemia     Anxiety     BPH (benign prostatic  hyperplasia)     Diverticulosis     Hypertension     Urinary retention        Past Surgical History:   Procedure Laterality Date    APPENDECTOMY      COLONOSCOPY      COLONOSCOPY N/A 2/1/2018    Procedure: COLONOSCOPY;  Surgeon: Richar Payan MD;  Location: [address];  Service: Endoscopy;  Laterality: N/A;  PM prep    EYE SURGERY Right     cataract extraction    FINGER SURGERY      FRACTURE SURGERY Right     index finger    TONSILLECTOMY         Family History   Problem Relation Age of Onset    Stroke Mother     Heart disease Mother      CABG    Heart disease Father      CABG    No Known Problems Sister     No Known Problems Brother        Social History     Social History    Marital status:      Spouse name: N/A    Number of children: N/A    Years of education: N/A     Occupational History     [employer]     Social History Main Topics    Smoking status: Never Smoker    Smokeless tobacco: Never Used    Alcohol use Yes      Comment: none recently    Drug use: No    Sexual activity: Not Currently     Partners: Female     Other Topics Concern    Not on file     Social History Narrative    No narrative on file       Allergies:  Ciprofloxacin; Flagyl [metronidazole]; Lisinopril; Mysoline [primidone]; Omnicef [cefdinir]; and Shellfish containing products    Medications:    Current Outpatient Prescriptions:     ALPRAZolam (XANAX) 0.25 MG tablet, Take 1 tablet (0.25 mg total) by mouth nightly as needed for Insomnia., Disp: 30 tablet, Rfl: 0    amLODIPine (NORVASC) 10 MG tablet, Take 1 tablet (10 mg total) by mouth once daily., Disp: 30 tablet, Rfl: 1    brompheniramine-pseudoeph-DM 2-30-10 mg/5 mL Syrp, , Disp: , Rfl:     CHERATUSSIN AC  mg/5 mL syrup, Take 10 mLs by mouth every 6 (six) hours as needed. , Disp: , Rfl:     fluticasone (FLONASE) 50 mcg/actuation nasal spray, 1 spray by Each Nare route 2 (two) times daily., Disp: 1 Bottle, Rfl: 11    ketorolac (TORADOL)  10 mg tablet, , Disp: , Rfl:     loratadine (CLARITIN) 10 mg tablet, Take 10 mg by mouth once daily., Disp: , Rfl:     mirtazapine (REMERON) 30 MG tablet, TAKE 1 TABLET EVERY EVENING, Disp: 90 tablet, Rfl: 3    tamsulosin (FLOMAX) 0.4 mg Cp24, Take 1 capsule (0.4 mg total) by mouth once daily., Disp: 90 capsule, Rfl: 3    traMADol (ULTRAM) 50 mg tablet, Take 50 mg by mouth every 6 (six) hours as needed for Pain., Disp: , Rfl:     VENTOLIN HFA 90 mcg/actuation inhaler, , Disp: , Rfl:     PHYSICAL EXAMINATION:    Constitutional: He appears well-developed and well-nourished.  He is in no apparent distress.    Eyes: No scleral icterus noted bilaterally.  No discharge noted bilaterally.      Cardiovascular: Normal rate.  No pitting edema noted in lower extremities bilaterally    Pulmonary/Chest: Effort normal. No respiratory distress.     Abdominal:  He exhibits no distension.  There is no CVA tenderness.     Lymphadenopathy:        Right: No supraclavicular adenopathy present.        Left: No supraclavicular adenopathy present.     Neurological: He is alert and oriented to person, place, and time.     Skin: Skin is warm and dry.     Psych: Cooperative with normal affect.    Physical Exam    LABS:    Lab Results   Component Value Date    PSADIAG 1.7 01/15/2018     IMPRESSION:    Encounter Diagnoses   Name Primary?    Urinary catheter (Lackey) change required Yes    Urinary retention          PLAN:    16 fr lackey catheter was replaced by Nurse Mimi.    Catheterized urine specimen sent for culture.  Will treat prior to SUDs/cysto.    Follow up for SUDs/cysto on 2/22.      Maria C Arceo PA-C

## 2018-02-14 NOTE — TELEPHONE ENCOUNTER
I notified the pt wife    Pt wife would like to know if he should see a pulmonologist  No temp   Only cough - on going since December  Sob occurs when he coughs  Pt can go to main campus

## 2018-02-15 NOTE — TELEPHONE ENCOUNTER
Pt wife notified wife  She will call to self schedule spirometry at [facility],  Can you call pt wife ceyc - to set up pulmonary appt at [facility] please?  Thank you,  angelina

## 2018-02-16 LAB
ALBUMIN SERPL-MCNC: 3.1 G/DL (ref 3.6–5.1)
ALBUMIN/GLOB SERPL: 1 (CALC) (ref 1–2.5)
ALP SERPL-CCNC: 67 U/L (ref 40–115)
ALT SERPL-CCNC: 13 U/L (ref 9–46)
AST SERPL-CCNC: 15 U/L (ref 10–35)
BASOPHILS # BLD AUTO: 13 CELLS/UL (ref 0–200)
BASOPHILS NFR BLD AUTO: 0.2 %
BILIRUB SERPL-MCNC: 0.6 MG/DL (ref 0.2–1.2)
BUN SERPL-MCNC: 17 MG/DL (ref 7–25)
BUN/CREAT SERPL: 12 (CALC) (ref 6–22)
CALCIUM SERPL-MCNC: 8.9 MG/DL (ref 8.6–10.3)
CHLORIDE SERPL-SCNC: 100 MMOL/L (ref 98–110)
CO2 SERPL-SCNC: 24 MMOL/L (ref 20–31)
CREAT SERPL-MCNC: 1.37 MG/DL (ref 0.7–1.25)
EOSINOPHIL # BLD AUTO: 132 CELLS/UL (ref 15–500)
EOSINOPHIL NFR BLD AUTO: 2 %
ERYTHROCYTE [DISTWIDTH] IN BLOOD BY AUTOMATED COUNT: 12.9 % (ref 11–15)
GFR SERPL CREATININE-BSD FRML MDRD: 53 ML/MIN/1.73M2
GLOBULIN SER CALC-MCNC: 3.2 G/DL (CALC) (ref 1.9–3.7)
GLUCOSE SERPL-MCNC: 134 MG/DL (ref 65–99)
HCT VFR BLD AUTO: 24.2 % (ref 38.5–50)
HGB BLD-MCNC: 8 G/DL (ref 13.2–17.1)
LYMPHOCYTES # BLD AUTO: 719 CELLS/UL (ref 850–3900)
LYMPHOCYTES NFR BLD AUTO: 10.9 %
MCH RBC QN AUTO: 28.6 PG (ref 27–33)
MCHC RBC AUTO-ENTMCNC: 33.1 G/DL (ref 32–36)
MCV RBC AUTO: 86.4 FL (ref 80–100)
MONOCYTES # BLD AUTO: 389 CELLS/UL (ref 200–950)
MONOCYTES NFR BLD AUTO: 5.9 %
NEUTROPHILS # BLD AUTO: 5346 CELLS/UL (ref 1500–7800)
NEUTROPHILS NFR BLD AUTO: 81 %
PLATELET # BLD AUTO: 202 THOUSAND/UL (ref 140–400)
PMV BLD REES-ECKER: 10.9 FL (ref 7.5–12.5)
POTASSIUM SERPL-SCNC: 4.4 MMOL/L (ref 3.5–5.3)
PROT SERPL-MCNC: 6.3 G/DL (ref 6.1–8.1)
RBC # BLD AUTO: 2.8 MILLION/UL (ref 4.2–5.8)
SODIUM SERPL-SCNC: 133 MMOL/L (ref 135–146)
WBC # BLD AUTO: 6.6 THOUSAND/UL (ref 3.8–10.8)

## 2018-02-18 LAB — BACTERIA UR CULT: NORMAL

## 2018-02-19 ENCOUNTER — TELEPHONE (OUTPATIENT)
Dept: FAMILY MEDICINE | Facility: CLINIC | Age: 68
End: 2018-02-19

## 2018-02-19 DIAGNOSIS — I10 ESSENTIAL HYPERTENSION: ICD-10-CM

## 2018-02-19 DIAGNOSIS — N39.0 BACTERIAL UTI: Primary | ICD-10-CM

## 2018-02-19 DIAGNOSIS — D64.9 ANEMIA, UNSPECIFIED TYPE: Primary | ICD-10-CM

## 2018-02-19 DIAGNOSIS — A49.9 BACTERIAL UTI: Primary | ICD-10-CM

## 2018-02-19 DIAGNOSIS — R05.3 CHRONIC COUGH: ICD-10-CM

## 2018-02-19 DIAGNOSIS — R33.9 URINARY RETENTION: ICD-10-CM

## 2018-02-19 RX ORDER — TAMSULOSIN HYDROCHLORIDE 0.4 MG/1
0.8 CAPSULE ORAL NIGHTLY
Qty: 180 CAPSULE | Refills: 3 | Status: ON HOLD | OUTPATIENT
Start: 2018-02-19 | End: 2018-03-06

## 2018-02-19 RX ORDER — SULFAMETHOXAZOLE AND TRIMETHOPRIM 800; 160 MG/1; MG/1
1 TABLET ORAL 2 TIMES DAILY
Qty: 14 TABLET | Refills: 0 | Status: ON HOLD | OUTPATIENT
Start: 2018-02-19 | End: 2018-03-06 | Stop reason: HOSPADM

## 2018-02-19 RX ORDER — TAMSULOSIN HYDROCHLORIDE 0.4 MG/1
0.8 CAPSULE ORAL NIGHTLY
Qty: 60 CAPSULE | Refills: 0 | Status: SHIPPED | OUTPATIENT
Start: 2018-02-19 | End: 2018-02-19 | Stop reason: SDUPTHER

## 2018-02-19 NOTE — TELEPHONE ENCOUNTER
I spoke with the pt and his wife and explained that his blood count has gone down lower  I advised that the pt needs to see a specialist   I also advised that Dr Claudio will call them almita to discuss further  [phone].

## 2018-02-19 NOTE — TELEPHONE ENCOUNTER
We need to repeat blood work this week mon or tue and need to get him in with DR. Horvath or other. HEM/onc

## 2018-02-19 NOTE — TELEPHONE ENCOUNTER
Message   Received: Today   Message Contents   Denice ACUNA [facility] Staff   Caller: The pt's wife (Today,  9:34 AM)             Would like to hear back regarding the blood work that he had last week Thursday.  ALSO, he can't hear out of one ear.  Please give them a call at [phone].

## 2018-02-19 NOTE — PROGRESS NOTES
Patient notified that he has a UTI and that antibiotics was sent to his pharmacy of choice.  Patient instructed to wear protective clothing while on medication.  He is scheduled for SUDS cysto on 2/22.  Refilled patients flomax for BID.

## 2018-02-20 NOTE — TELEPHONE ENCOUNTER
First available with dr espinal is march 6. Will that be ok? I have not notified the pt yet?  Did you tell him he needs to draw blood again this week?

## 2018-02-21 ENCOUNTER — HOSPITAL ENCOUNTER (INPATIENT)
Facility: HOSPITAL | Age: 68
LOS: 13 days | Discharge: HOME | DRG: 871 | End: 2018-03-06
Attending: EMERGENCY MEDICINE | Admitting: HOSPITALIST
Payer: MEDICARE

## 2018-02-21 ENCOUNTER — TELEPHONE (OUTPATIENT)
Dept: FAMILY MEDICINE | Facility: CLINIC | Age: 68
End: 2018-02-21

## 2018-02-21 ENCOUNTER — HOSPITAL ENCOUNTER (OUTPATIENT)
Dept: PULMONOLOGY | Facility: HOSPITAL | Age: 68
Discharge: HOME | End: 2018-02-21
Attending: FAMILY MEDICINE
Payer: MEDICARE

## 2018-02-21 DIAGNOSIS — R05.3 CHRONIC COUGH: ICD-10-CM

## 2018-02-21 DIAGNOSIS — I38 ENDOCARDITIS, UNSPECIFIED CHRONICITY, UNSPECIFIED ENDOCARDITIS TYPE: ICD-10-CM

## 2018-02-21 DIAGNOSIS — I10 HTN (HYPERTENSION): Chronic | ICD-10-CM

## 2018-02-21 DIAGNOSIS — J06.9 UPPER RESPIRATORY TRACT INFECTION, UNSPECIFIED TYPE: ICD-10-CM

## 2018-02-21 DIAGNOSIS — R91.8 PULMONARY NODULES: ICD-10-CM

## 2018-02-21 DIAGNOSIS — E44.1 MILD MALNUTRITION: ICD-10-CM

## 2018-02-21 DIAGNOSIS — N17.9 AKI (ACUTE KIDNEY INJURY): ICD-10-CM

## 2018-02-21 DIAGNOSIS — J96.01 ACUTE HYPOXEMIC RESPIRATORY FAILURE: ICD-10-CM

## 2018-02-21 DIAGNOSIS — R06.02 SHORTNESS OF BREATH: ICD-10-CM

## 2018-02-21 DIAGNOSIS — R05.9 COUGH: ICD-10-CM

## 2018-02-21 DIAGNOSIS — I50.1 PULMONARY EDEMA CARDIAC CAUSE: ICD-10-CM

## 2018-02-21 DIAGNOSIS — D64.9 ANEMIA, UNSPECIFIED TYPE: Primary | ICD-10-CM

## 2018-02-21 DIAGNOSIS — I10 ESSENTIAL HYPERTENSION: Chronic | ICD-10-CM

## 2018-02-21 DIAGNOSIS — M25.473 ANKLE EDEMA: ICD-10-CM

## 2018-02-21 DIAGNOSIS — I10 ESSENTIAL HYPERTENSION: ICD-10-CM

## 2018-02-21 DIAGNOSIS — R82.71 ASYMPTOMATIC BACTERIURIA: ICD-10-CM

## 2018-02-21 DIAGNOSIS — D64.9 ANEMIA, UNSPECIFIED TYPE: ICD-10-CM

## 2018-02-21 DIAGNOSIS — R05.8 DRY COUGH: ICD-10-CM

## 2018-02-21 DIAGNOSIS — F41.9 ANXIETY: ICD-10-CM

## 2018-02-21 DIAGNOSIS — I33.0 MITRAL VALVE VEGETATION: ICD-10-CM

## 2018-02-21 DIAGNOSIS — R33.9 URINARY RETENTION: ICD-10-CM

## 2018-02-21 DIAGNOSIS — I50.9 CHF (CONGESTIVE HEART FAILURE): ICD-10-CM

## 2018-02-21 DIAGNOSIS — D64.9 SYMPTOMATIC ANEMIA: ICD-10-CM

## 2018-02-21 DIAGNOSIS — I38 ENDOCARDITIS: ICD-10-CM

## 2018-02-21 LAB
ABO + RH BLD: NORMAL
ALBUMIN SERPL BCP-MCNC: 2.8 G/DL
ALBUMIN SERPL-MCNC: 2.9 G/DL (ref 3.6–5.1)
ALBUMIN/GLOB SERPL: 1 (CALC) (ref 1–2.5)
ALP SERPL-CCNC: 64 U/L (ref 40–115)
ALP SERPL-CCNC: 82 U/L
ALT SERPL W/O P-5'-P-CCNC: 15 U/L
ALT SERPL-CCNC: 11 U/L (ref 9–46)
ANION GAP SERPL CALC-SCNC: 11 MMOL/L
APTT BLDCRRT: 29.9 SEC
AST SERPL-CCNC: 16 U/L (ref 10–35)
AST SERPL-CCNC: 21 U/L
BACTERIA #/AREA URNS AUTO: NORMAL /HPF
BASOPHILS # BLD AUTO: 0.01 K/UL
BASOPHILS # BLD AUTO: 13 CELLS/UL (ref 0–200)
BASOPHILS NFR BLD AUTO: 0.2 %
BASOPHILS NFR BLD: 0.2 %
BILIRUB SERPL-MCNC: 0.4 MG/DL
BILIRUB SERPL-MCNC: 0.6 MG/DL (ref 0.2–1.2)
BILIRUB UR QL STRIP: NEGATIVE
BLD GP AB SCN CELLS X3 SERPL QL: NORMAL
BUN SERPL-MCNC: 30 MG/DL (ref 7–25)
BUN SERPL-MCNC: 33 MG/DL
BUN/CREAT SERPL: 16 (CALC) (ref 6–22)
CALCIUM SERPL-MCNC: 8.4 MG/DL (ref 8.6–10.3)
CALCIUM SERPL-MCNC: 8.7 MG/DL
CHLORIDE SERPL-SCNC: 101 MMOL/L
CHLORIDE SERPL-SCNC: 101 MMOL/L (ref 98–110)
CLARITY UR REFRACT.AUTO: CLEAR
CO2 SERPL-SCNC: 20 MMOL/L
CO2 SERPL-SCNC: 27 MMOL/L (ref 20–31)
COLOR UR AUTO: ABNORMAL
CREAT SERPL-MCNC: 1.88 MG/DL (ref 0.7–1.25)
CREAT SERPL-MCNC: 2 MG/DL
DIFFERENTIAL METHOD: ABNORMAL
EOSINOPHIL # BLD AUTO: 0.2 K/UL
EOSINOPHIL # BLD AUTO: 139 CELLS/UL (ref 15–500)
EOSINOPHIL NFR BLD AUTO: 2.2 %
EOSINOPHIL NFR BLD: 3.5 %
ERYTHROCYTE [DISTWIDTH] IN BLOOD BY AUTOMATED COUNT: 13.2 % (ref 11–15)
ERYTHROCYTE [DISTWIDTH] IN BLOOD BY AUTOMATED COUNT: 14.3 %
EST. GFR  (AFRICAN AMERICAN): 38.8 ML/MIN/1.73 M^2
EST. GFR  (NON AFRICAN AMERICAN): 33.5 ML/MIN/1.73 M^2
GFR SERPL CREATININE-BSD FRML MDRD: 36 ML/MIN/1.73M2
GLOBULIN SER CALC-MCNC: 3 G/DL (CALC) (ref 1.9–3.7)
GLUCOSE SERPL-MCNC: 106 MG/DL
GLUCOSE SERPL-MCNC: 122 MG/DL (ref 65–99)
GLUCOSE UR QL STRIP: NEGATIVE
HCT VFR BLD AUTO: 20.5 %
HCT VFR BLD AUTO: 21 % (ref 38.5–50)
HGB BLD-MCNC: 6.7 G/DL
HGB BLD-MCNC: 7.1 G/DL (ref 13.2–17.1)
HGB UR QL STRIP: ABNORMAL
IMM GRANULOCYTES # BLD AUTO: 0.03 K/UL
IMM GRANULOCYTES NFR BLD AUTO: 0.6 %
INR PPP: 1.1
KETONES UR QL STRIP: NEGATIVE
LEUKOCYTE ESTERASE UR QL STRIP: ABNORMAL
LYMPHOCYTES # BLD AUTO: 0.8 K/UL
LYMPHOCYTES # BLD AUTO: 498 CELLS/UL (ref 850–3900)
LYMPHOCYTES NFR BLD AUTO: 7.9 %
LYMPHOCYTES NFR BLD: 14.4 %
MCH RBC QN AUTO: 27.9 PG
MCH RBC QN AUTO: 28.4 PG (ref 27–33)
MCHC RBC AUTO-ENTMCNC: 32.7 G/DL
MCHC RBC AUTO-ENTMCNC: 33.8 G/DL (ref 32–36)
MCV RBC AUTO: 84 FL (ref 80–100)
MCV RBC AUTO: 85 FL
MICROSCOPIC COMMENT: NORMAL
MONOCYTES # BLD AUTO: 0.5 K/UL
MONOCYTES # BLD AUTO: 359 CELLS/UL (ref 200–950)
MONOCYTES NFR BLD AUTO: 5.7 %
MONOCYTES NFR BLD: 9.4 %
NEUTROPHILS # BLD AUTO: 3.9 K/UL
NEUTROPHILS # BLD AUTO: 5292 CELLS/UL (ref 1500–7800)
NEUTROPHILS NFR BLD AUTO: 84 %
NEUTROPHILS NFR BLD: 71.9 %
NITRITE UR QL STRIP: NEGATIVE
NRBC BLD-RTO: 0 /100 WBC
PH UR STRIP: 7 [PH] (ref 5–8)
PLATELET # BLD AUTO: 147 K/UL
PLATELET # BLD AUTO: 162 THOUSAND/UL (ref 140–400)
PMV BLD AUTO: 10.6 FL
PMV BLD REES-ECKER: 11 FL (ref 7.5–12.5)
POTASSIUM SERPL-SCNC: 4.4 MMOL/L (ref 3.5–5.3)
POTASSIUM SERPL-SCNC: 4.8 MMOL/L
PROT SERPL-MCNC: 5.9 G/DL (ref 6.1–8.1)
PROT SERPL-MCNC: 6.9 G/DL
PROT UR QL STRIP: NEGATIVE
PROTHROMBIN TIME: 11.2 SEC
RBC # BLD AUTO: 2.4 M/UL
RBC # BLD AUTO: 2.5 MILLION/UL (ref 4.2–5.8)
RBC #/AREA URNS AUTO: 4 /HPF (ref 0–4)
RETICS #: NORMAL CELLS/UL (ref 25000–90000)
RETICS/RBC NFR AUTO: 1.4 %
SODIUM SERPL-SCNC: 132 MMOL/L
SODIUM SERPL-SCNC: 133 MMOL/L (ref 135–146)
SP GR UR STRIP: 1 (ref 1–1.03)
URN SPEC COLLECT METH UR: ABNORMAL
UROBILINOGEN UR STRIP-ACNC: NEGATIVE EU/DL
WBC # BLD AUTO: 5.43 K/UL
WBC # BLD AUTO: 6.3 THOUSAND/UL (ref 3.8–10.8)
WBC #/AREA URNS AUTO: 2 /HPF (ref 0–5)

## 2018-02-21 PROCEDURE — 86901 BLOOD TYPING SEROLOGIC RH(D): CPT

## 2018-02-21 PROCEDURE — 99285 EMERGENCY DEPT VISIT HI MDM: CPT | Mod: 25

## 2018-02-21 PROCEDURE — 85610 PROTHROMBIN TIME: CPT

## 2018-02-21 PROCEDURE — 93010 ELECTROCARDIOGRAM REPORT: CPT | Mod: ,,, | Performed by: INTERNAL MEDICINE

## 2018-02-21 PROCEDURE — 85025 COMPLETE CBC W/AUTO DIFF WBC: CPT

## 2018-02-21 PROCEDURE — 94010 BREATHING CAPACITY TEST: CPT

## 2018-02-21 PROCEDURE — 12000002 HC ACUTE/MED SURGE SEMI-PRIVATE ROOM

## 2018-02-21 PROCEDURE — 93005 ELECTROCARDIOGRAM TRACING: CPT

## 2018-02-21 PROCEDURE — 80053 COMPREHEN METABOLIC PANEL: CPT

## 2018-02-21 PROCEDURE — 81001 URINALYSIS AUTO W/SCOPE: CPT

## 2018-02-21 PROCEDURE — 85730 THROMBOPLASTIN TIME PARTIAL: CPT

## 2018-02-21 PROCEDURE — 99285 EMERGENCY DEPT VISIT HI MDM: CPT | Mod: ,,, | Performed by: EMERGENCY MEDICINE

## 2018-02-21 PROCEDURE — 86920 COMPATIBILITY TEST SPIN: CPT

## 2018-02-21 PROCEDURE — 36430 TRANSFUSION BLD/BLD COMPNT: CPT

## 2018-02-21 RX ORDER — HYDROCODONE BITARTRATE AND ACETAMINOPHEN 500; 5 MG/1; MG/1
TABLET ORAL
Status: DISCONTINUED | OUTPATIENT
Start: 2018-02-22 | End: 2018-02-25

## 2018-02-21 NOTE — TELEPHONE ENCOUNTER
H/H has dropped per Dr Claudio  I discussed this with the patient. He actually said he feels a little better today.   I advised if he starts to feel worse or any SOB that he needs to go to the ER.  I advised him that we will try to get sooner appt with Dr Horvath and then notify him     Do you need any more labs in the mean time?        Dr Claudio - Please send Dr Horvath a note requesting a sooner appt

## 2018-02-22 PROBLEM — E44.1 MILD MALNUTRITION: Status: ACTIVE | Noted: 2018-02-22

## 2018-02-22 PROBLEM — R05.3 CHRONIC COUGH: Status: ACTIVE | Noted: 2018-02-22

## 2018-02-22 PROBLEM — R91.8 PULMONARY NODULES: Status: ACTIVE | Noted: 2018-02-22

## 2018-02-22 PROBLEM — D64.9 SYMPTOMATIC ANEMIA: Status: ACTIVE | Noted: 2018-02-22

## 2018-02-22 PROBLEM — N17.9 AKI (ACUTE KIDNEY INJURY): Status: ACTIVE | Noted: 2018-02-22

## 2018-02-22 LAB
ALBUMIN SERPL BCP-MCNC: 2.6 G/DL
ALP SERPL-CCNC: 76 U/L
ALT SERPL W/O P-5'-P-CCNC: 13 U/L
ANION GAP SERPL CALC-SCNC: 9 MMOL/L
AST SERPL-CCNC: 21 U/L
BASOPHILS # BLD AUTO: 0.01 K/UL
BASOPHILS # BLD AUTO: 0.01 K/UL
BASOPHILS NFR BLD: 0.1 %
BASOPHILS NFR BLD: 0.2 %
BILIRUB SERPL-MCNC: 1.3 MG/DL
BLD PROD TYP BPU: NORMAL
BLOOD UNIT EXPIRATION DATE: NORMAL
BLOOD UNIT TYPE CODE: 6200
BLOOD UNIT TYPE: NORMAL
BUN SERPL-MCNC: 27 MG/DL
CALCIUM SERPL-MCNC: 8.7 MG/DL
CHLORIDE SERPL-SCNC: 104 MMOL/L
CO2 SERPL-SCNC: 22 MMOL/L
CODING SYSTEM: NORMAL
CREAT SERPL-MCNC: 1.9 MG/DL
CRP SERPL-MCNC: 77.5 MG/L
DIFFERENTIAL METHOD: ABNORMAL
DIFFERENTIAL METHOD: ABNORMAL
DISPENSE STATUS: NORMAL
EOSINOPHIL # BLD AUTO: 0.1 K/UL
EOSINOPHIL # BLD AUTO: 0.2 K/UL
EOSINOPHIL NFR BLD: 2 %
EOSINOPHIL NFR BLD: 3.1 %
ERYTHROCYTE [DISTWIDTH] IN BLOOD BY AUTOMATED COUNT: 14 %
ERYTHROCYTE [DISTWIDTH] IN BLOOD BY AUTOMATED COUNT: 14.2 %
ERYTHROCYTE [SEDIMENTATION RATE] IN BLOOD BY WESTERGREN METHOD: 76 MM/HR
EST. GFR  (AFRICAN AMERICAN): 41.3 ML/MIN/1.73 M^2
EST. GFR  (NON AFRICAN AMERICAN): 35.7 ML/MIN/1.73 M^2
ESTIMATED AVG GLUCOSE: 108 MG/DL
GLUCOSE SERPL-MCNC: 127 MG/DL
HAPTOGLOB SERPL-MCNC: 110 MG/DL
HBA1C MFR BLD HPLC: 5.4 %
HCT VFR BLD AUTO: 22 %
HCT VFR BLD AUTO: 22.2 %
HGB BLD-MCNC: 7.2 G/DL
HGB BLD-MCNC: 7.3 G/DL
IMM GRANULOCYTES # BLD AUTO: 0.03 K/UL
IMM GRANULOCYTES # BLD AUTO: 0.04 K/UL
IMM GRANULOCYTES NFR BLD AUTO: 0.4 %
IMM GRANULOCYTES NFR BLD AUTO: 0.7 %
LDH SERPL L TO P-CCNC: 256 U/L
LYMPHOCYTES # BLD AUTO: 0.7 K/UL
LYMPHOCYTES # BLD AUTO: 0.7 K/UL
LYMPHOCYTES NFR BLD: 11.5 %
LYMPHOCYTES NFR BLD: 9.6 %
MAGNESIUM SERPL-MCNC: 2.2 MG/DL
MCH RBC QN AUTO: 28.7 PG
MCH RBC QN AUTO: 29.1 PG
MCHC RBC AUTO-ENTMCNC: 32.7 G/DL
MCHC RBC AUTO-ENTMCNC: 32.9 G/DL
MCV RBC AUTO: 88 FL
MCV RBC AUTO: 88 FL
MONOCYTES # BLD AUTO: 0.5 K/UL
MONOCYTES # BLD AUTO: 0.6 K/UL
MONOCYTES NFR BLD: 7.7 %
MONOCYTES NFR BLD: 9 %
NEUTROPHILS # BLD AUTO: 4.7 K/UL
NEUTROPHILS # BLD AUTO: 5.6 K/UL
NEUTROPHILS NFR BLD: 76.8 %
NEUTROPHILS NFR BLD: 78.9 %
NRBC BLD-RTO: 0 /100 WBC
NRBC BLD-RTO: 0 /100 WBC
PHOSPHATE SERPL-MCNC: 3.7 MG/DL
PLATELET # BLD AUTO: 159 K/UL
PLATELET # BLD AUTO: 164 K/UL
PMV BLD AUTO: 10.6 FL
PMV BLD AUTO: 10.7 FL
POTASSIUM SERPL-SCNC: 4.6 MMOL/L
PREALB SERPL-MCNC: 12 MG/DL
PROT SERPL-MCNC: 6.4 G/DL
RBC # BLD AUTO: 2.51 M/UL
RBC # BLD AUTO: 2.51 M/UL
SODIUM SERPL-SCNC: 135 MMOL/L
TRANS ERYTHROCYTES VOL PATIENT: NORMAL ML
WBC # BLD AUTO: 6.1 K/UL
WBC # BLD AUTO: 7.09 K/UL

## 2018-02-22 PROCEDURE — 99223 1ST HOSP IP/OBS HIGH 75: CPT | Mod: ,,, | Performed by: INTERNAL MEDICINE

## 2018-02-22 PROCEDURE — 25000003 PHARM REV CODE 250: Performed by: HOSPITALIST

## 2018-02-22 PROCEDURE — 99223 1ST HOSP IP/OBS HIGH 75: CPT | Mod: AI,,, | Performed by: HOSPITALIST

## 2018-02-22 PROCEDURE — S5010 5% DEXTROSE AND 0.45% SALINE: HCPCS | Performed by: HOSPITALIST

## 2018-02-22 PROCEDURE — 11000001 HC ACUTE MED/SURG PRIVATE ROOM

## 2018-02-22 PROCEDURE — 83735 ASSAY OF MAGNESIUM: CPT

## 2018-02-22 PROCEDURE — 94761 N-INVAS EAR/PLS OXIMETRY MLT: CPT

## 2018-02-22 PROCEDURE — 87522 HEPATITIS C REVRS TRNSCRPJ: CPT

## 2018-02-22 PROCEDURE — 94640 AIRWAY INHALATION TREATMENT: CPT

## 2018-02-22 PROCEDURE — P9021 RED BLOOD CELLS UNIT: HCPCS

## 2018-02-22 PROCEDURE — 86140 C-REACTIVE PROTEIN: CPT

## 2018-02-22 PROCEDURE — 25000242 PHARM REV CODE 250 ALT 637 W/ HCPCS: Performed by: INTERNAL MEDICINE

## 2018-02-22 PROCEDURE — 83010 ASSAY OF HAPTOGLOBIN QUANT: CPT

## 2018-02-22 PROCEDURE — 85651 RBC SED RATE NONAUTOMATED: CPT

## 2018-02-22 PROCEDURE — 84100 ASSAY OF PHOSPHORUS: CPT

## 2018-02-22 PROCEDURE — 83615 LACTATE (LD) (LDH) ENZYME: CPT

## 2018-02-22 PROCEDURE — 86480 TB TEST CELL IMMUN MEASURE: CPT

## 2018-02-22 PROCEDURE — 80053 COMPREHEN METABOLIC PANEL: CPT

## 2018-02-22 PROCEDURE — 36415 COLL VENOUS BLD VENIPUNCTURE: CPT

## 2018-02-22 PROCEDURE — 83036 HEMOGLOBIN GLYCOSYLATED A1C: CPT

## 2018-02-22 PROCEDURE — 84134 ASSAY OF PREALBUMIN: CPT

## 2018-02-22 PROCEDURE — 85025 COMPLETE CBC W/AUTO DIFF WBC: CPT | Mod: 91

## 2018-02-22 PROCEDURE — 99900035 HC TECH TIME PER 15 MIN (STAT)

## 2018-02-22 PROCEDURE — 25000242 PHARM REV CODE 250 ALT 637 W/ HCPCS: Performed by: HOSPITALIST

## 2018-02-22 RX ORDER — IBUPROFEN 200 MG
24 TABLET ORAL
Status: DISCONTINUED | OUTPATIENT
Start: 2018-02-22 | End: 2018-02-25

## 2018-02-22 RX ORDER — IPRATROPIUM BROMIDE AND ALBUTEROL SULFATE 2.5; .5 MG/3ML; MG/3ML
3 SOLUTION RESPIRATORY (INHALATION)
Status: DISCONTINUED | OUTPATIENT
Start: 2018-02-22 | End: 2018-02-24

## 2018-02-22 RX ORDER — SODIUM CHLORIDE 0.9 % (FLUSH) 0.9 %
5 SYRINGE (ML) INJECTION
Status: DISCONTINUED | OUTPATIENT
Start: 2018-02-22 | End: 2018-02-27

## 2018-02-22 RX ORDER — PROMETHAZINE HYDROCHLORIDE AND CODEINE PHOSPHATE 6.25; 1 MG/5ML; MG/5ML
5 SOLUTION ORAL EVERY 4 HOURS PRN
Status: DISCONTINUED | OUTPATIENT
Start: 2018-02-22 | End: 2018-02-25

## 2018-02-22 RX ORDER — MIRTAZAPINE 15 MG/1
30 TABLET, FILM COATED ORAL NIGHTLY
Status: DISCONTINUED | OUTPATIENT
Start: 2018-02-22 | End: 2018-03-07 | Stop reason: HOSPADM

## 2018-02-22 RX ORDER — GLUCAGON 1 MG
1 KIT INJECTION
Status: DISCONTINUED | OUTPATIENT
Start: 2018-02-22 | End: 2018-02-25

## 2018-02-22 RX ORDER — ACETAMINOPHEN 325 MG/1
650 TABLET ORAL EVERY 6 HOURS PRN
Status: DISCONTINUED | OUTPATIENT
Start: 2018-02-22 | End: 2018-02-25

## 2018-02-22 RX ORDER — SODIUM CHLORIDE 9 MG/ML
INJECTION, SOLUTION INTRAVENOUS CONTINUOUS
Status: DISCONTINUED | OUTPATIENT
Start: 2018-02-22 | End: 2018-02-24

## 2018-02-22 RX ORDER — CETIRIZINE HYDROCHLORIDE 10 MG/1
10 TABLET ORAL DAILY
Status: DISCONTINUED | OUTPATIENT
Start: 2018-02-22 | End: 2018-02-25

## 2018-02-22 RX ORDER — IBUPROFEN 200 MG
16 TABLET ORAL
Status: DISCONTINUED | OUTPATIENT
Start: 2018-02-22 | End: 2018-02-25

## 2018-02-22 RX ORDER — AMLODIPINE BESYLATE 10 MG/1
10 TABLET ORAL DAILY
Status: DISCONTINUED | OUTPATIENT
Start: 2018-02-22 | End: 2018-03-07 | Stop reason: HOSPADM

## 2018-02-22 RX ORDER — HEPARIN SODIUM 5000 [USP'U]/ML
5000 INJECTION, SOLUTION INTRAVENOUS; SUBCUTANEOUS EVERY 8 HOURS
Status: DISCONTINUED | OUTPATIENT
Start: 2018-02-22 | End: 2018-02-22

## 2018-02-22 RX ORDER — IPRATROPIUM BROMIDE AND ALBUTEROL SULFATE 2.5; .5 MG/3ML; MG/3ML
3 SOLUTION RESPIRATORY (INHALATION) EVERY 4 HOURS PRN
Status: DISCONTINUED | OUTPATIENT
Start: 2018-02-22 | End: 2018-02-24

## 2018-02-22 RX ORDER — TAMSULOSIN HYDROCHLORIDE 0.4 MG/1
0.8 CAPSULE ORAL NIGHTLY
Status: DISCONTINUED | OUTPATIENT
Start: 2018-02-22 | End: 2018-02-27

## 2018-02-22 RX ORDER — ONDANSETRON 2 MG/ML
4 INJECTION INTRAMUSCULAR; INTRAVENOUS EVERY 6 HOURS PRN
Status: DISCONTINUED | OUTPATIENT
Start: 2018-02-22 | End: 2018-03-07 | Stop reason: HOSPADM

## 2018-02-22 RX ORDER — IPRATROPIUM BROMIDE AND ALBUTEROL SULFATE 2.5; .5 MG/3ML; MG/3ML
3 SOLUTION RESPIRATORY (INHALATION) ONCE
Status: COMPLETED | OUTPATIENT
Start: 2018-02-22 | End: 2018-02-22

## 2018-02-22 RX ORDER — FLUTICASONE PROPIONATE 50 MCG
1 SPRAY, SUSPENSION (ML) NASAL 2 TIMES DAILY
Status: DISCONTINUED | OUTPATIENT
Start: 2018-02-22 | End: 2018-03-07 | Stop reason: HOSPADM

## 2018-02-22 RX ORDER — TRAMADOL HYDROCHLORIDE 50 MG/1
50 TABLET ORAL EVERY 6 HOURS PRN
Status: DISCONTINUED | OUTPATIENT
Start: 2018-02-22 | End: 2018-02-25

## 2018-02-22 RX ORDER — ALPRAZOLAM 0.25 MG/1
0.25 TABLET ORAL NIGHTLY PRN
Status: DISCONTINUED | OUTPATIENT
Start: 2018-02-22 | End: 2018-02-25

## 2018-02-22 RX ORDER — DEXTROSE MONOHYDRATE AND SODIUM CHLORIDE 5; .45 G/100ML; G/100ML
INJECTION, SOLUTION INTRAVENOUS CONTINUOUS
Status: DISCONTINUED | OUTPATIENT
Start: 2018-02-22 | End: 2018-02-22

## 2018-02-22 RX ORDER — SULFAMETHOXAZOLE AND TRIMETHOPRIM 800; 160 MG/1; MG/1
1 TABLET ORAL 2 TIMES DAILY
Status: DISCONTINUED | OUTPATIENT
Start: 2018-02-22 | End: 2018-02-23

## 2018-02-22 RX ADMIN — AMLODIPINE BESYLATE 10 MG: 10 TABLET ORAL at 08:02

## 2018-02-22 RX ADMIN — PROMETHAZINE HYDROCHLORIDE AND CODEINE PHOSPHATE 5 ML: 10; 6.25 SOLUTION ORAL at 09:02

## 2018-02-22 RX ADMIN — TAMSULOSIN HYDROCHLORIDE 0.8 MG: 0.4 CAPSULE ORAL at 08:02

## 2018-02-22 RX ADMIN — IPRATROPIUM BROMIDE AND ALBUTEROL SULFATE 3 ML: .5; 3 SOLUTION RESPIRATORY (INHALATION) at 04:02

## 2018-02-22 RX ADMIN — SODIUM CHLORIDE: 0.9 INJECTION, SOLUTION INTRAVENOUS at 09:02

## 2018-02-22 RX ADMIN — FLUTICASONE PROPIONATE 50 MCG: 50 SPRAY, METERED NASAL at 08:02

## 2018-02-22 RX ADMIN — SULFAMETHOXAZOLE AND TRIMETHOPRIM 1 TABLET: 800; 160 TABLET ORAL at 08:02

## 2018-02-22 RX ADMIN — MIRTAZAPINE 30 MG: 15 TABLET, FILM COATED ORAL at 08:02

## 2018-02-22 RX ADMIN — SODIUM CHLORIDE: 0.9 INJECTION, SOLUTION INTRAVENOUS at 08:02

## 2018-02-22 RX ADMIN — PROMETHAZINE HYDROCHLORIDE AND CODEINE PHOSPHATE 5 ML: 10; 6.25 SOLUTION ORAL at 01:02

## 2018-02-22 RX ADMIN — CETIRIZINE HYDROCHLORIDE 10 MG: 10 TABLET, FILM COATED ORAL at 08:02

## 2018-02-22 RX ADMIN — IPRATROPIUM BROMIDE AND ALBUTEROL SULFATE 3 ML: .5; 3 SOLUTION RESPIRATORY (INHALATION) at 08:02

## 2018-02-22 RX ADMIN — DEXTROSE AND SODIUM CHLORIDE: 5; .45 INJECTION, SOLUTION INTRAVENOUS at 05:02

## 2018-02-22 RX ADMIN — PROMETHAZINE HYDROCHLORIDE AND CODEINE PHOSPHATE 5 ML: 10; 6.25 SOLUTION ORAL at 08:02

## 2018-02-22 RX ADMIN — ALPRAZOLAM 0.25 MG: 0.25 TABLET ORAL at 08:02

## 2018-02-22 NOTE — ED NOTES
LOC: The patient is awake, alert, aware of environment with an appropriate affect. Oriented x4, speaking appropriately  APPEARANCE: Pt resting comfortably, in no acute distress, pt is clean and well groomed, clothing properly fastened  SKIN:The skin is warm and dry, color consistent with ethnicity, patient has normal skin turgor and moist mucus membranes  MUSCULOSKELETAL: Patient moving all extremities spontaneously, no obvious swelling or deformities noted.  NEUROLOGIC: PERRLA, facial expression is symmetrical, patient moving all extremities spontaneously, normal sensation in all extremities when touched with a finger.  Follows all commands appropriately. Left arm twitch noted.   HEAD/FACE: The head is rounded; normocephalic and symmetrical.   RESPIRATORY:Airway is open and patent, respirations are spontaneous, patient has a normal effort and rate, no accessory muscle use noted. RR 20 equal and unlabored.   CARDIAC: Normal rate and rhythm, no peripheral edema noted, capillary refill < 3 seconds, bilateral radial pulses 2+.  ABDOMEN: S/ND/NT, normoactive bowel sounds present in all four quadrants. Normal stool pattern. Jugular veins are non-distended.

## 2018-02-22 NOTE — ED PROVIDER NOTES
Encounter Date: 2/21/2018       History     Chief Complaint   Patient presents with    Abnormal Lab     low H&H. hx of anemia and blood transfusion     Patient is a 67-year-old male, hypertension, urinary retention with Lainez, and anemia, who presents as a referral for an abnormal lab ×1 day. Patient notes he was in his usual state of health until over 1 month ago, when he noted generalized weakness, weight loss, and nonproductive cough.  He had previously been admitted for iron deficiency anemia requiring blood transfusion, and had a colonoscopy as well as EGD on 1 February, only significant for diverticulosis and internal hemorrhoids.  He was discharged and had a repeat blood draw concerning for anemia.  He was sent for evaluation.  He currently only endorses a dry cough, generalized malaise, weight loss.          Review of patient's allergies indicates:   Allergen Reactions    Ciprofloxacin     Flagyl [metronidazole]     Lisinopril Other (See Comments)     cough    Mysoline [primidone]     Omnicef [cefdinir]     Shellfish containing products      Past Medical History:   Diagnosis Date    Anemia     Anxiety     BPH (benign prostatic hyperplasia)     Diverticulosis     Hypertension     Urinary retention      Past Surgical History:   Procedure Laterality Date    APPENDECTOMY      COLONOSCOPY      COLONOSCOPY N/A 2/1/2018    Procedure: COLONOSCOPY;  Surgeon: Richar Payan MD;  Location: [facility] ([address] [place] [address]);  Service: Endoscopy;  Laterality: N/A;  PM prep    EYE SURGERY Right     cataract extraction    FINGER SURGERY      FRACTURE SURGERY Right     index finger    TONSILLECTOMY       Family History   Problem Relation Age of Onset    Stroke Mother     Heart disease Mother      CABG    Heart disease Father      CABG    No Known Problems Sister     No Known Problems Brother      Social History   Substance Use Topics    Smoking status: Never Smoker    Smokeless tobacco: Never Used     Alcohol use Yes      Comment: none recently     Review of Systems   Constitutional: Positive for unexpected weight change. Negative for chills and fever.   HENT: Negative for congestion, rhinorrhea and sore throat.    Eyes: Negative for visual disturbance.   Respiratory: Positive for cough. Negative for shortness of breath.    Cardiovascular: Negative for chest pain.   Gastrointestinal: Negative for abdominal pain, constipation, diarrhea, nausea and vomiting.   Genitourinary: Positive for dysuria (Chronic, BPH with lackey). Negative for hematuria.   Musculoskeletal: Negative for back pain.   Skin: Negative for rash.   Neurological: Positive for weakness. Negative for dizziness, syncope and headaches.       Physical Exam     Initial Vitals [02/21/18 1814]   BP Pulse Resp Temp SpO2   129/63 110 20 99.1 °F (37.3 °C) 100 %      MAP       85         Physical Exam    Nursing note and vitals reviewed.  Constitutional: He appears well-developed and well-nourished. He is not diaphoretic. No distress.   Wife at bedside   HENT:   Head: Normocephalic and atraumatic.   Mouth/Throat: No oropharyngeal exudate.   Eyes: Pupils are equal, round, and reactive to light. Right eye exhibits no discharge. Left eye exhibits no discharge. No scleral icterus.   Neck: No tracheal deviation present. No JVD present.   Cardiovascular: Normal rate, regular rhythm, normal heart sounds and intact distal pulses. Exam reveals no friction rub.    No murmur heard.  Pulmonary/Chest: Breath sounds normal. No respiratory distress. He has no wheezes. He has no rhonchi. He has no rales.   Abdominal: Soft. Bowel sounds are normal. He exhibits no distension. There is no tenderness. There is no guarding.   Genitourinary:   Genitourinary Comments: Lackey noted on R leg, clear yellow urine present    Musculoskeletal: He exhibits no edema or tenderness.   Lymphadenopathy:     He has no cervical adenopathy.   Neurological: He is alert.   Moves all extremities and  carries on conversation.      Skin: Skin is warm and dry. Capillary refill takes less than 2 seconds.   Psychiatric: He has a normal mood and affect.         ED Course   Procedures  Labs Reviewed   CBC W/ AUTO DIFFERENTIAL   COMPREHENSIVE METABOLIC PANEL   URINALYSIS, REFLEX TO URINE CULTURE   PROTIME-INR   APTT   TYPE & SCREEN     EKG Readings: (Independently Interpreted)   Initial Reading: No STEMI. Rhythm: Sinus Tachycardia. Heart Rate: 104.   Tachycardia, rate 104, normal axis, normal intervals, no enlargement, no acute ST segment changes                APC / Resident Notes:   PGY-3 MDM:   -Patient is a 67 y.o. presenting with a chief complaint of low H/H x 1 day. Vital signs stable, patient afebrile, tachycardic and non-hypoxic.   -Patient currently undergoing workup for anemia and weight loss. He had a colonoscopy and EGD only significant for diverticulosis and internal hemorrhoids. Past FOBTs have been negative. He has chronic generalized weakness with weight loss and chronic cough.   -DDx includes anemia vs malignancy vs pneumonia. Will re-check H/H today, with type and screen. Will check CXR with possible malignancy given weight loss and malaise. He does not have an active bleeding source per history, without melena/hematochezia and a relatively benign exam on colonoscopy/EGD this month.     Workup ongoing, will continue to re-assess patient and update management as needed.     Terry Gómez DO  [employer] EM/IM PGY-3  2/21/2018 9:58 PM         Patient Care Update:  -Lab workup significant for acute anemia with hemoglobin 6.7 and hematocrit 20.5, MCV is 85.  Complete metabolic panel is consistent with previous studies with creatinine at 2, baseline is 1.88. Coagulation studies normal. UA without infection. CXR normal, without evidence of nodules or mass.   -Will consent for blood and consult for observation for acute anemia requiring blood transfusion.      Terry Gómez DO  [employer] EM/IM PGY-3  2/21/2018 10:33  PM                    Clinical Impression:   The primary encounter diagnosis was Anemia, unspecified type. Diagnoses of Shortness of breath and Cough were also pertinent to this visit.    Disposition:   Disposition: Placed in Observation                        Terry Gómez MD  Resident  02/22/18 3978

## 2018-02-22 NOTE — HPI
Pt is a 68 y/o M with PMHx BPH with intermittent indwelling lackey, history of anemia and a chronic cough. He was admitted for anemia. Had a recent c-scope and EGD that were unrevealing. Received 1 unit PRBC with good response. He has had a chronic cough since December when he had a viral illness. He has since been put on steroids twice for this and has noted no difference in his cough. Cough is not worse at a specific time of day. Doesn't endorse reflux symptoms. Sleeps with two pillows at night. Minimal sputum production (yellow). No hemoptysis. No fevers/chills.

## 2018-02-22 NOTE — CONSULTS
[facility]  Pulmonology  Consult Note    Patient Name: Rodolfo Messina  MRN: [id number]  Admission Date: 2/21/2018  Hospital Length of Stay: 1 days  Code Status: Full Code  Attending Physician: Babak Bran MD  Primary Care Provider: Deric Claudio MD   Principal Problem: Symptomatic anemia    Inpatient consult to Pulmonology  Consult performed by: NIKI DE OLIVEIRA  Consult ordered by: BABAK BRAN  Reason for consult: abormal CT        Subjective:     HPI:  Pt is a 68 y/o M with PMHx BPH with intermittent indwelling lackey, history of anemia and a chronic cough. He was admitted for anemia. Had a recent c-scope and EGD that were unrevealing. Received 1 unit PRBC with good response. He has had a chronic cough since December when he had a viral illness. He has since been put on steroids twice for this and has noted no difference in his cough. Cough is not worse at a specific time of day. Doesn't endorse reflux symptoms. Sleeps with two pillows at night. Minimal sputum production (yellow). No hemoptysis. No fevers/chills.     Past Medical History:   Diagnosis Date    ANNA (acute kidney injury) 2/22/2018    Anemia     Anxiety     BPH (benign prostatic hyperplasia)     Diverticulosis     Hypertension     Urinary retention        Past Surgical History:   Procedure Laterality Date    APPENDECTOMY      COLONOSCOPY      COLONOSCOPY N/A 2/1/2018    Procedure: COLONOSCOPY;  Surgeon: Richar Payan MD;  Location: [address];  Service: Endoscopy;  Laterality: N/A;  PM prep    EYE SURGERY Right     cataract extraction    FINGER SURGERY      FRACTURE SURGERY Right     index finger    TONSILLECTOMY         Review of patient's allergies indicates:   Allergen Reactions    Ciprofloxacin     Flagyl [metronidazole]     Lisinopril Other (See Comments)     cough    Mysoline [primidone]     Omnicef [cefdinir]     Shellfish containing products        Family History     Problem  Relation (Age of Onset)    Heart disease Mother, Father    No Known Problems Sister, Brother    Stroke Mother        Social History Main Topics    Smoking status: Never Smoker    Smokeless tobacco: Never Used    Alcohol use Yes      Comment: none recently    Drug use: No    Sexual activity: Not Currently     Partners: Female         Review of Systems   GEN: NAD, No fever, chills, nightsweats  HEENT: Denies HA, rhinorrhea, throat pain or erythema, choking or itchy/red eyes  PULM: See HPI  CVS: see HPI, denies palpitations  GI/: Denies N/V/abdominal pain, no blood in stool, no hematuria  HEM/ONC: No weight changes, hemetemesis, hematochezia, hematuria  SKIN: No skin changes or rashes.   RHEUM: No joint pain, stiffness  MUSKL: normal gait.   PSYCH: Denies suicidal ideation/homocidal ideation, visual or auditory hallucination.         Objective:     Vital Signs (Most Recent):  Temp: 97.8 °F (36.6 °C) (02/22/18 1114)  Pulse: 98 (02/22/18 1530)  Resp: 18 (02/22/18 1114)  BP: (!) 125/57 (02/22/18 1114)  SpO2: 95 % (02/22/18 1114) Vital Signs (24h Range):  Temp:  [97.8 °F (36.6 °C)-99.1 °F (37.3 °C)] 97.8 °F (36.6 °C)  Pulse:  [] 98  Resp:  [18-20] 18  SpO2:  [93 %-100 %] 95 %  BP: (125-139)/(57-72) 125/57     Weight: 74.4 kg (164 lb)  Body mass index is 24.22 kg/m².      Intake/Output Summary (Last 24 hours) at 02/22/18 1557  Last data filed at 02/22/18 1200   Gross per 24 hour   Intake           612.92 ml   Output             3360 ml   Net         -2747.08 ml       Physical Exam   General: NAD, cooperative & interactive.  HEENT: AT/NC, PERRL, EOMI, oral mucosa moist.   Neck: Supple without JVD or palpable LAD.   Cardiac: RRR with no MRG; with brisk cap refill in distal extremities.  Respiratory: CTA bilaterally  Abdomen: Soft, NT/ND. +BS.   Extremities: No edema.   Skin: no rashes or lesions  Neuro: Grossly intact to brief exam.      Vents:       Lines/Drains/Airways     Drain                 Urethral  Catheter 02/15/18 16 Fr. 7 days          Peripheral Intravenous Line                 Peripheral IV - Single Lumen 02/21/18 2120 Right Upper Arm less than 1 day                Significant Labs:    CBC/Anemia Profile:    Recent Labs  Lab 02/21/18 2132 02/22/18  0612   WBC 5.43 7.09   HGB 6.7* 7.3*   HCT 20.5* 22.2*   * 159   MCV 85 88   RDW 14.3 14.0        Chemistries:    Recent Labs  Lab 02/21/18 2132 02/22/18  0612   * 135*   K 4.8 4.6    104   CO2 20* 22*   BUN 33* 27*   CREATININE 2.0* 1.9*   CALCIUM 8.7 8.7   ALBUMIN 2.8* 2.6*   PROT 6.9 6.4   BILITOT 0.4 1.3*   ALKPHOS 82 76   ALT 15 13   AST 21 21   MG  --  2.2   PHOS  --  3.7       Significant Imaging:   I have reviewed and interpreted all pertinent imaging results/findings within the past 24 hours.    Assessment/Plan:     * Symptomatic anemia    GI & H/O consulted as per primary team.        Pulmonary nodules    As above. Will follow up in pulmonary clinic to discuss timing of repeat imaging as an outpatient.        Chronic cough    Unclear if this is still post-viral cough so would continue to treat for allergic rhinitis. No reflux symptoms but if he expresses any then would treat for this. Do not find a reason for his cough based on his CT findings of small nodules which appear consistent with infection/inflammation. Would add duo-nebs scheduled to assess if there is any bronchitic component. Has a pulmonary clinic appointment on 2/26 and will need PFT's at this visit.              Thank you for your consult. I will sign off. Please contact us if you have any additional questions.     Nata Sutherland MD  Pulmonology  [facility]

## 2018-02-22 NOTE — TREATMENT PLAN
Recent EGD/colon - negative.   Negative FOBT in ER. No melena.  Consider other causes of anemia.   Recommend supportive care with blood transfusions.   He has follow up appointment with Dr. Bettencourt from GI as outpatient.   No inpatient workup needed.     Call GI if any questions.     Eric Sanon MD  Gastroenterology & Hepatology Fellow  Pager: [phone]

## 2018-02-22 NOTE — PLAN OF CARE
02/22/18 0943   Discharge Assessment   Assessment Type Discharge Planning Assessment   Confirmed/corrected address and phone number on facesheet? Yes   Assessment information obtained from? Patient;Caregiver   Expected Length of Stay (days) 3   Communicated expected length of stay with patient/caregiver yes   Prior to hospitilization cognitive status: Alert/Oriented   Prior to hospitalization functional status: Independent   Current cognitive status: Alert/Oriented   Current Functional Status: Independent   Lives With spouse   Able to Return to Prior Arrangements yes   Is patient able to care for self after discharge? Yes   Patient's perception of discharge disposition home or selfcare   Readmission Within The Last 30 Days previous discharge plan unsuccessful   If yes, most recent facility name: [facility]   Patient currently being followed by outpatient case management? No   Patient currently receives any other outside agency services? No   Equipment Currently Used at Home none   Do you have any problems affording any of your prescribed medications? No   Is the patient taking medications as prescribed? yes   Does the patient have transportation home? Yes   Discharge Plan B Home with family   Patient/Family In Agreement With Plan yes   Met with patient and spouse and explained role of CM. Will follow for d/c needs.

## 2018-02-22 NOTE — SUBJECTIVE & OBJECTIVE
Past Medical History:   Diagnosis Date    ANNA (acute kidney injury) 2/22/2018    Anemia     Anxiety     BPH (benign prostatic hyperplasia)     Diverticulosis     Hypertension     Urinary retention        Past Surgical History:   Procedure Laterality Date    APPENDECTOMY      COLONOSCOPY      COLONOSCOPY N/A 2/1/2018    Procedure: COLONOSCOPY;  Surgeon: Richar Payan MD;  Location: [address]);  Service: Endoscopy;  Laterality: N/A;  PM prep    EYE SURGERY Right     cataract extraction    FINGER SURGERY      FRACTURE SURGERY Right     index finger    TONSILLECTOMY         Review of patient's allergies indicates:   Allergen Reactions    Ciprofloxacin     Flagyl [metronidazole]     Lisinopril Other (See Comments)     cough    Mysoline [primidone]     Omnicef [cefdinir]     Shellfish containing products        Family History     Problem Relation (Age of Onset)    Heart disease Mother, Father    No Known Problems Sister, Brother    Stroke Mother        Social History Main Topics    Smoking status: Never Smoker    Smokeless tobacco: Never Used    Alcohol use Yes      Comment: none recently    Drug use: No    Sexual activity: Not Currently     Partners: Female         Review of Systems   GEN: NAD, No fever, chills, nightsweats  HEENT: Denies HA, rhinorrhea, throat pain or erythema, choking or itchy/red eyes  PULM: See HPI  CVS: see HPI, denies palpitations  GI/: Denies N/V/abdominal pain, no blood in stool, no hematuria  HEM/ONC: No weight changes, hemetemesis, hematochezia, hematuria  SKIN: No skin changes or rashes.   RHEUM: No joint pain, stiffness  MUSKL: normal gait.   PSYCH: Denies suicidal ideation/homocidal ideation, visual or auditory hallucination.         Objective:     Vital Signs (Most Recent):  Temp: 97.8 °F (36.6 °C) (02/22/18 1114)  Pulse: 98 (02/22/18 1530)  Resp: 18 (02/22/18 1114)  BP: (!) 125/57 (02/22/18 1114)  SpO2: 95 % (02/22/18 1114) Vital Signs (24h  Range):  Temp:  [97.8 °F (36.6 °C)-99.1 °F (37.3 °C)] 97.8 °F (36.6 °C)  Pulse:  [] 98  Resp:  [18-20] 18  SpO2:  [93 %-100 %] 95 %  BP: (125-139)/(57-72) 125/57     Weight: 74.4 kg (164 lb)  Body mass index is 24.22 kg/m².      Intake/Output Summary (Last 24 hours) at 02/22/18 1557  Last data filed at 02/22/18 1200   Gross per 24 hour   Intake           612.92 ml   Output             3360 ml   Net         -2747.08 ml       Physical Exam   General: NAD, cooperative & interactive.  HEENT: AT/NC, PERRL, EOMI, oral mucosa moist.   Neck: Supple without JVD or palpable LAD.   Cardiac: RRR with no MRG; with brisk cap refill in distal extremities.  Respiratory: CTA bilaterally  Abdomen: Soft, NT/ND. +BS.   Extremities: No edema.   Skin: no rashes or lesions  Neuro: Grossly intact to brief exam.      Vents:       Lines/Drains/Airways     Drain                 Urethral Catheter 02/15/18 16 Fr. 7 days          Peripheral Intravenous Line                 Peripheral IV - Single Lumen 02/21/18 2120 Right Upper Arm less than 1 day                Significant Labs:    CBC/Anemia Profile:    Recent Labs  Lab 02/21/18 2132 02/22/18  0612   WBC 5.43 7.09   HGB 6.7* 7.3*   HCT 20.5* 22.2*   * 159   MCV 85 88   RDW 14.3 14.0        Chemistries:    Recent Labs  Lab 02/21/18 2132 02/22/18  0612   * 135*   K 4.8 4.6    104   CO2 20* 22*   BUN 33* 27*   CREATININE 2.0* 1.9*   CALCIUM 8.7 8.7   ALBUMIN 2.8* 2.6*   PROT 6.9 6.4   BILITOT 0.4 1.3*   ALKPHOS 82 76   ALT 15 13   AST 21 21   MG  --  2.2   PHOS  --  3.7       Significant Imaging:   I have reviewed and interpreted all pertinent imaging results/findings within the past 24 hours.

## 2018-02-22 NOTE — PROGRESS NOTES
Progress Note  Hospital Medicine    Admit Date: 2/21/2018  Length of Stay:  LOS: 1 day     SUBJECTIVE:         Follow-up For:  Symptomatic anemia    HPI/Interval history (See H&P for complete P,F,SHx) :     2/22/18  s/p unit of PRBC today. Hb 6.7--> 7.3. discussed with GI.Negative FOBT in ER. No melena. Recommended supportive care with blood transfusions. capsule study as outpatient. CT chest -Multiple nodules and micronodular clusters seen throughout the right lung. Largest focal opacity is within the posterior segment of the right upper lobe measuring 1.0 cm.infectious versus noninfectious inflammation; however, underlying malignancy cannot be entirely excluded. Pulmonology consulted       Review of Systems: List if applicable  Pain scale: 0/10  Constitutional- Positive for  Weakness, wt loss, intermittent night sweats  Resp- Positive for dry Cough, SOB  GI- Positive for Nausea, poor oral intake     OBJECTIVE:     Vital Signs Range (Last 24H):  Temp:  [97.8 °F (36.6 °C)-99.1 °F (37.3 °C)]   Pulse:  []   Resp:  [18-20]   BP: (125-139)/(57-72)   SpO2:  [93 %-100 %]     Physical Exam:  General- Patient alert and oriented x3   HEENT- PERRLA, EOMI, OP clear  Neck- No JVD, Lymphadenopathy, Thyromegaly  CV- Regular rate and rhythm, No Murmur/francia/rubs  Resp- Lungs CTA Bilaterally, No increased WOB  Abdomen- Non tender/non-distended, BS normoactive x4 quads, no HSM  Extrem- No cyanosis, clubbing, edema.   Skin- No rashes, lesions, ulcers  Neuro- able to move upper and lower extremities without limitation      Medications:  Medication list was reviewed and changes noted under Assessment/Plan.      Current Facility-Administered Medications:     0.9%  NaCl infusion (for blood administration), , Intravenous, Q24H PRN, Teryr Gómez MD    0.9%  NaCl infusion, , Intravenous, Continuous, Babak Magallanes MD, Last Rate: 100 mL/hr at 02/22/18 0945    acetaminophen tablet 650 mg, 650 mg, Oral, Q6H PRN, Lincoln GONZALEZ  Jolie, DO    albuterol-ipratropium 2.5mg-0.5mg/3mL nebulizer solution 3 mL, 3 mL, Nebulization, Q4H PRN, Lincoln Dave, DO    ALPRAZolam tablet 0.25 mg, 0.25 mg, Oral, Nightly PRN, Lincoln Lowh, DO    amLODIPine tablet 10 mg, 10 mg, Oral, Daily, Lincoln Dave, DO, 10 mg at 02/22/18 0804    cetirizine tablet 10 mg, 10 mg, Oral, Daily, Lincoln Dave, DO, 10 mg at 02/22/18 0804    dextrose 50% injection 12.5 g, 12.5 g, Intravenous, PRN, Terry Gómez MD    dextrose 50% injection 12.5 g, 12.5 g, Intravenous, PRN, Lincoln Lowh, DO    dextrose 50% injection 25 g, 25 g, Intravenous, PRN, Terry Gómez MD    dextrose 50% injection 25 g, 25 g, Intravenous, PRN, Lincoln Dave, DO    fluticasone 50 mcg/actuation nasal spray 50 mcg, 1 spray, Each Nare, BID, Lincoln Dave, DO, 50 mcg at 02/22/18 0803    glucagon (human recombinant) injection 1 mg, 1 mg, Intramuscular, PRN, Terry Gómez MD    glucagon (human recombinant) injection 1 mg, 1 mg, Intramuscular, PRN, Lincoln Dave, DO    glucose chewable tablet 16 g, 16 g, Oral, PRN, Trery Gómez MD    glucose chewable tablet 16 g, 16 g, Oral, PRN, Lincoln Dave, DO    glucose chewable tablet 24 g, 24 g, Oral, PRN, Terry Gómez MD    glucose chewable tablet 24 g, 24 g, Oral, PRN, Lincoln Dave, DO    mirtazapine tablet 30 mg, 30 mg, Oral, QHS, Lincoln GONZALEZ Jolie, DO    ondansetron injection 4 mg, 4 mg, Intravenous, Q6H PRN, Lincoln Dave, DO    promethazine-codeine 6.25-10 mg/5 ml syrup 5 mL, 5 mL, Oral, Q4H PRN, Lincoln Dave DO, 5 mL at 02/22/18 0945    sodium chloride 0.9% flush 5 mL, 5 mL, Intravenous, PRN, Terry Góemz MD    sodium chloride 0.9% flush 5 mL, 5 mL, Intravenous, PRN, Lincoln Dave,     sulfamethoxazole-trimethoprim 800-160mg per tablet 1 tablet, 1 tablet, Oral, BID, Lincoln Dave DO, 1 tablet at 02/22/18 0804    tamsulosin 24 hr capsule 0.8 mg, 0.8 mg, Oral, QHS, Lincoln Dave DO    traMADol tablet 50 mg, 50 mg, Oral, Q6H  PRN, Lincoln Dave, DO    sodium chloride, acetaminophen, albuterol-ipratropium 2.5mg-0.5mg/3mL, ALPRAZolam, dextrose 50%, dextrose 50%, dextrose 50%, dextrose 50%, glucagon (human recombinant), glucagon (human recombinant), glucose, glucose, glucose, glucose, ondansetron, promethazine-codeine 6.25-10 mg/5 ml, sodium chloride 0.9%, sodium chloride 0.9%, traMADol    Laboratory/Diagnostic Data:  Reviewed and noted in plan where applicable- Please see chart for full lab data.      Recent Labs  Lab 02/20/18 0935 02/21/18 2132 02/22/18 0612   WBC 6.3 5.43 7.09   HGB 7.1* 6.7* 7.3*   HCT 21.0* 20.5* 22.2*    147* 159         Recent Labs  Lab 02/20/18 0935 02/21/18 2132 02/22/18  0612   * 132* 135*   K 4.4 4.8 4.6    101 104   CO2 27 20* 22*   BUN 30* 33* 27*   CREATININE 1.88* 2.0* 1.9*   * 106 127*   CALCIUM 8.4* 8.7 8.7   MG  --   --  2.2   PHOS  --   --  3.7         Recent Labs  Lab 02/20/18 0935 02/21/18 2132 02/22/18  0612   ALKPHOS 64 82 76   ALT 11 15 13   AST 16 21 21   ALBUMIN 2.9* 2.8* 2.6*   PROT 5.9* 6.9 6.4   BILITOT 0.6 0.4 1.3*   INR  --  1.1  --         Microbiology labs for the last week  Microbiology Results (last 7 days)     ** No results found for the last 168 hours. **           Imaging Results          X-Ray Chest PA And Lateral (Final result)  Result time 02/21/18 21:59:42    Final result by Pradeep Peralta MD (02/21/18 21:59:42)                 Impression:     No acute cardiopulmonary abnormality.      Electronically signed by: Pradeep Peralta  Date:     02/21/18  Time:    21:59              Narrative:    EXAM:  2 VIEW CHEST RADIOGRAPH.     CLINICAL INDICATION:  Cough.    COMPARISON: 01/27/2018.    TECHNIQUE:  Two views of the chest were obtained.     FINDINGS: Cardiac silhouette is not significantly enlarged and stable in size.  Minimal linear subsegmental atelectasis at the lung bases. No focal consolidation.  No pleural effusion or pneumothorax.  No acute bony  "abnormality is identified.                              Estimated body mass index is 24.22 kg/m² as calculated from the following:    Height as of this encounter: 5' 9" (1.753 m).    Weight as of this encounter: 74.4 kg (164 lb).    I & O (Last 24H):    Intake/Output Summary (Last 24 hours) at 02/22/18 1355  Last data filed at 02/22/18 1200   Gross per 24 hour   Intake           612.92 ml   Output             3360 ml   Net         -2747.08 ml       Estimated Creatinine Clearance: 37.7 mL/min (A) (based on SCr of 1.9 mg/dL (H)).    ASSESSMENT/PLAN:     Active Problems:    Active Hospital Problems    Diagnosis  POA    *Symptomatic anemia [D64.9]EGD and colonoscopy recently on 02/01/18 showed erosive gastropathy w/o bleeding.   S/p unit of PRBC today. Hb 6.7--> 7.3. discussed with GI.Negative FOBT in ER. No melena. Recommended supportive care with blood transfusions. capsule study as outpatient.  Yes    ANNA (acute kidney injury) [N17.9]Cr 1.2 --> 1.9. likley prerrenal. continue IV hydration  Yes    Chronic cough [R05]dry .CT chest -Multiple nodules and micronodular clusters seen throughout the right lung. Largest focal opacity is within the posterior segment of the right upper lobe measuring 1.0 cm.infectious versus noninfectious inflammation; however, underlying malignancy cannot be entirely excluded. Pulmonology consulted . history of exposure to asbestos    Indwelling lackey for Urinary retention -currently on 2/ 7 days course of bactrim for UTI . urine C/S from 2/14 - proteus .urodynamic study canceled and scheduled as outpatient      Essential tremor -  on remeron . follows up with neurology    Malnutrition - prealbumin of 12. Dietary consulted     Weight loss, night sweats - 10lb .CT abdomen/pelvis last month negative for malignancy .quantiferon gold for further evaluation   Yes    Anxiety [F41.9]xanax prn   Yes    HTN (hypertension) [I10]- controlled on amlodipine   Yes     Chronic      Resolved Hospital " Problems    Diagnosis Date Resolved POA   No resolved problems to display.         Disposition- Home    DVT prophylaxis addressed with: B/L SCD            Babak Magallanes MD  Attending Staff Physician  [facility]  pager- [phone] [id number] - [id number]

## 2018-02-22 NOTE — H&P
[facility] Medicine  History & Physical    Patient Name: Rodolfo Messina  MRN: [id number]  Admission Date: 2/21/2018  Attending Physician: Lincoln Dave DO   Primary Care Provider: Deric Claudio MD    [facility] Medicine Team: [facility] [place] [facility] T Lincoln Dave DO     Patient information was obtained from patient and ER records.     Subjective:     Principal Problem:Symptomatic anemia    Chief Complaint:   Chief Complaint   Patient presents with    Abnormal Lab     low H&H. hx of anemia and blood transfusion        HPI:      Mr. Messina is a 67-year-old white male with depression,anxiety,essential tremor, indwelling lackey secondary to urinary retention for 4 wks,chronic back pain, and HTN presents to ED with recurrent anemia revealed in labs done by PCP yesterday. Hgb dropped from recent 8 to 7.  Patient was recently admitted overnight 01/27/18 - 01/28/18 for symptomatic anemia associated with fatigue. He received one unit blood transfusion with improved Hgb from 7 to 8.5. Follow up EGD and colonoscopy on 02/01/18 showed non bleeding erosive gastropathy, internal hemorrhoid and diverticulosis in sigmoid and descending colon. He has been scheduled to follow up with heme oncology clinic next month for evaluation of anemia.     Patient denies dark tarry stool, BRBPR, hematuria, hemoptysis or bleeding from any other sites. FOBT negative in ED. He reports persistent dry cough since around roseanne when he developed URI. Also reports occasional low grade fever and night sweats. Notes fatigue, decreased appetite and 10 lb weight loss since roseanne. Denies chest pain, palpitation, dizziness, dysuria, heart burn, abdominal pain, NSAID use. 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    He received one unit PRBC overnight in ED.     He is currently on bactrim prescribed by urology for recent UTI ( end date 02/26/18 )  and scheduled for urodynamic study at 9:30 am this morning by urology on 4th floor.     Past Medical History:   Diagnosis Date    ANNA (acute kidney injury) 2/22/2018    Anemia     Anxiety     BPH (benign prostatic hyperplasia)     Diverticulosis     Hypertension     Urinary retention        Past Surgical History:   Procedure Laterality Date    APPENDECTOMY      COLONOSCOPY      COLONOSCOPY N/A 2/1/2018    Procedure: COLONOSCOPY;  Surgeon: Richar Payan MD;  [facility] ([address] [place] [address]);  Service: Endoscopy;  Laterality: N/A;  PM prep    EYE SURGERY Right     cataract extraction    FINGER SURGERY      FRACTURE SURGERY Right     index finger    TONSILLECTOMY         Review of patient's allergies indicates:   Allergen Reactions    Ciprofloxacin     Flagyl [metronidazole]     Lisinopril Other (See Comments)     cough    Mysoline [primidone]     Omnicef [cefdinir]     Shellfish containing products        No current facility-administered medications on file prior to encounter.      Current Outpatient Prescriptions on File Prior to Encounter   Medication Sig    ALPRAZolam (XANAX) 0.25 MG tablet Take 1 tablet (0.25 mg total) by mouth nightly as needed for Insomnia.    amLODIPine (NORVASC) 10 MG tablet Take 1 tablet (10 mg total) by mouth once daily.    fluticasone (FLONASE) 50 mcg/actuation nasal spray 1 spray by Each Nare route 2 (two) times daily.    ketorolac (TORADOL) 10 mg tablet     loratadine (CLARITIN) 10 mg tablet Take 10 mg by mouth once daily.    sulfamethoxazole-trimethoprim 800-160mg (BACTRIM DS) 800-160 mg Tab Take 1 tablet by mouth 2 (two) times daily.    tamsulosin (FLOMAX) 0.4 mg Cp24 Take 2 capsules (0.8 mg total) by mouth every evening.    VENTOLIN HFA 90 mcg/actuation inhaler     brompheniramine-pseudoeph-DM 2-30-10 mg/5 mL Syrp     CHERATUSSIN AC  mg/5 mL syrup Take 10 mLs by mouth every 6 (six) hours as needed.     mirtazapine (REMERON) 30 MG tablet TAKE 1  TABLET EVERY EVENING    traMADol (ULTRAM) 50 mg tablet Take 50 mg by mouth every 6 (six) hours as needed for Pain.     Family History     Problem Relation (Age of Onset)    Heart disease Mother, Father    No Known Problems Sister, Brother    Stroke Mother        Social History Main Topics    Smoking status: Never Smoker    Smokeless tobacco: Never Used    Alcohol use Yes      Comment: none recently    Drug use: No    Sexual activity: Not Currently     Partners: Female     Review of Systems   Constitutional: Positive for appetite change, fatigue and unexpected weight change. Negative for activity change, chills, diaphoresis and fever.   HENT: Positive for congestion. Negative for dental problem, drooling, ear discharge, ear pain, facial swelling, hearing loss, mouth sores, nosebleeds, postnasal drip, rhinorrhea, sinus pressure, sneezing, sore throat, tinnitus, trouble swallowing and voice change.    Eyes: Negative for photophobia, pain, discharge, redness, itching and visual disturbance.   Respiratory: Positive for cough. Negative for apnea, choking, chest tightness, shortness of breath, wheezing and stridor.    Cardiovascular: Negative for chest pain, palpitations and leg swelling.   Gastrointestinal: Negative for abdominal distention, abdominal pain, anal bleeding, blood in stool, constipation, diarrhea, nausea, rectal pain and vomiting.   Endocrine: Negative for cold intolerance, heat intolerance, polydipsia, polyphagia and polyuria.   Genitourinary: Positive for difficulty urinating. Negative for decreased urine volume, discharge, dysuria, enuresis, flank pain, frequency, genital sores, hematuria, penile pain, penile swelling, scrotal swelling, testicular pain and urgency.   Musculoskeletal: Negative for arthralgias, back pain, gait problem, joint swelling, myalgias, neck pain and neck stiffness.   Skin: Negative for color change, pallor, rash and wound.   Allergic/Immunologic: Negative for environmental  allergies, food allergies and immunocompromised state.   Neurological: Negative for dizziness, tremors, seizures, syncope, facial asymmetry, speech difficulty, weakness, light-headedness, numbness and headaches.   Hematological: Negative for adenopathy. Does not bruise/bleed easily.   Psychiatric/Behavioral: Negative for agitation, behavioral problems, confusion, decreased concentration, dysphoric mood, hallucinations, self-injury, sleep disturbance and suicidal ideas. The patient is not nervous/anxious and is not hyperactive.      Objective:     Vital Signs (Most Recent):  Temp: 98 °F (36.7 °C) (02/22/18 0310)  Pulse: 99 (02/22/18 0407)  Resp: 18 (02/22/18 0407)  BP: 128/72 (02/22/18 0310)  SpO2: 95 % (02/22/18 0407) Vital Signs (24h Range):  Temp:  [98 °F (36.7 °C)-99.1 °F (37.3 °C)] 98 °F (36.7 °C)  Pulse:  [] 99  Resp:  [18-20] 18  SpO2:  [93 %-100 %] 95 %  BP: (126-139)/(59-72) 128/72     Weight: 74.4 kg (164 lb)  Body mass index is 24.22 kg/m².    Physical Exam   Constitutional: He is oriented to person, place, and time. He appears well-developed and well-nourished. No distress.   HENT:   Head: Normocephalic and atraumatic.   Nose: Nose normal.   Mouth/Throat: Oropharynx is clear and moist. No oropharyngeal exudate.   Eyes: Conjunctivae and EOM are normal. Pupils are equal, round, and reactive to light. No scleral icterus.   Neck: Normal range of motion. Neck supple. No JVD present. No tracheal deviation present. No thyromegaly present.   Cardiovascular: Normal rate, regular rhythm, normal heart sounds and intact distal pulses.    No murmur heard.  Pulmonary/Chest: Effort normal and breath sounds normal. No respiratory distress. He has no wheezes. He has no rales. He exhibits no tenderness.   Abdominal: Soft. Bowel sounds are normal. He exhibits no distension and no mass. There is no tenderness. There is no rebound and no guarding.   Genitourinary:   Genitourinary Comments: Indwelling lackey in place  draining straw colored clear urine    Musculoskeletal: Normal range of motion. He exhibits no edema or tenderness.   Lymphadenopathy:     He has no cervical adenopathy.   Neurological: He is alert and oriented to person, place, and time. He has normal reflexes. He displays normal reflexes. No cranial nerve deficit. He exhibits normal muscle tone. Coordination normal.   Skin: Skin is warm and dry. No rash noted. He is not diaphoretic. No erythema.   Psychiatric: He has a normal mood and affect. Judgment and thought content normal.         CRANIAL NERVES     CN III, IV, VI   Pupils are equal, round, and reactive to light.  Extraocular motions are normal.       Significant Labs:   Recent Results (from the past 24 hour(s))   CBC auto differential    Collection Time: 02/21/18  9:32 PM   Result Value Ref Range    WBC 5.43 3.90 - 12.70 K/uL    RBC 2.40 (L) 4.60 - 6.20 M/uL    Hemoglobin 6.7 (L) 14.0 - 18.0 g/dL    Hematocrit 20.5 (L) 40.0 - 54.0 %    MCV 85 82 - 98 fL    MCH 27.9 27.0 - 31.0 pg    MCHC 32.7 32.0 - 36.0 g/dL    RDW 14.3 11.5 - 14.5 %    Platelets 147 (L) 150 - 350 K/uL    MPV 10.6 9.2 - 12.9 fL    Immature Granulocytes 0.6 (H) 0.0 - 0.5 %    Gran # (ANC) 3.9 1.8 - 7.7 K/uL    Immature Grans (Abs) 0.03 0.00 - 0.04 K/uL    Lymph # 0.8 (L) 1.0 - 4.8 K/uL    Mono # 0.5 0.3 - 1.0 K/uL    Eos # 0.2 0.0 - 0.5 K/uL    Baso # 0.01 0.00 - 0.20 K/uL    nRBC 0 0 /100 WBC    Gran% 71.9 38.0 - 73.0 %    Lymph% 14.4 (L) 18.0 - 48.0 %    Mono% 9.4 4.0 - 15.0 %    Eosinophil% 3.5 0.0 - 8.0 %    Basophil% 0.2 0.0 - 1.9 %    Differential Method Automated    Comprehensive metabolic panel    Collection Time: 02/21/18  9:32 PM   Result Value Ref Range    Sodium 132 (L) 136 - 145 mmol/L    Potassium 4.8 3.5 - 5.1 mmol/L    Chloride 101 95 - 110 mmol/L    CO2 20 (L) 23 - 29 mmol/L    Glucose 106 70 - 110 mg/dL    BUN, Bld 33 (H) 8 - 23 mg/dL    Creatinine 2.0 (H) 0.5 - 1.4 mg/dL    Calcium 8.7 8.7 - 10.5 mg/dL    Total Protein 6.9  6.0 - 8.4 g/dL    Albumin 2.8 (L) 3.5 - 5.2 g/dL    Total Bilirubin 0.4 0.1 - 1.0 mg/dL    Alkaline Phosphatase 82 55 - 135 U/L    AST 21 10 - 40 U/L    ALT 15 10 - 44 U/L    Anion Gap 11 8 - 16 mmol/L    eGFR if African American 38.8 (A) >60 mL/min/1.73 m^2    eGFR if non  33.5 (A) >60 mL/min/1.73 m^2   Urinalysis, Reflex to Urine Culture Urine, Clean Catch    Collection Time: 02/21/18  9:32 PM   Result Value Ref Range    Specimen UA Urine, Catheterized     Color, UA Straw Yellow, Straw, Kesha    Appearance, UA Clear Clear    pH, UA 7.0 5.0 - 8.0    Specific Gravity, UA 1.005 1.005 - 1.030    Protein, UA Negative Negative    Glucose, UA Negative Negative    Ketones, UA Negative Negative    Bilirubin (UA) Negative Negative    Occult Blood UA 1+ (A) Negative    Nitrite, UA Negative Negative    Urobilinogen, UA Negative <2.0 EU/dL    Leukocytes, UA 1+ (A) Negative   Protime-INR    Collection Time: 02/21/18  9:32 PM   Result Value Ref Range    Prothrombin Time 11.2 9.0 - 12.5 sec    INR 1.1 0.8 - 1.2   APTT    Collection Time: 02/21/18  9:32 PM   Result Value Ref Range    aPTT 29.9 21.0 - 32.0 sec   Type & Screen    Collection Time: 02/21/18  9:32 PM   Result Value Ref Range    Group & Rh A POS     Indirect Juan Alberto NEG    Urinalysis Microscopic    Collection Time: 02/21/18  9:32 PM   Result Value Ref Range    RBC, UA 4 0 - 4 /hpf    WBC, UA 2 0 - 5 /hpf    Bacteria, UA Rare None-Occ /hpf    Microscopic Comment SEE COMMENT    Prepare RBC 1 Unit    Collection Time: 02/21/18  9:32 PM   Result Value Ref Range    UNIT NUMBER [id number]     PRODUCT CODE [id number]     DISPENSE STATUS ISSUED     CODING SYSTEM [id number]     Unit Blood Type Code 6200     Unit Blood Type A POS     Unit Expiration [id number]        Significant Imaging: I have reviewed and interpreted all pertinent imaging results/findings within the past 24 hours.     EGD (02/01/18 ) :   Impression:           - Small hiatal hernia.                         - Non-bleeding erosive gastropathy, biopsied.                        - Normal examined duodenum, biopsied.  Recommendation:       - Patient has a contact number available for                         emergencies. The signs and symptoms of potential                         delayed complications were discussed with the                         patient. Return to normal activities tomorrow.                         Written discharge instructions were provided to the                         patient.                        - Discharge patient to home.                        - Resume previous diet.                        - Continue present medications.                        - Await pathology results.    Colonoscopy ( 02/01/18 ) :    Impression:           - Internal hemorrhoids.                        - Diverticulosis in the sigmoid colon and in the                         descending colon.    Recommendation:       - Discharge patient to home.                        - Patient has a contact number available for                         emergencies. The signs and symptoms of potential                         delayed complications were discussed with the                         patient. Return to normal activities tomorrow.                         Written discharge instructions were provided to the                         patient.                        - Resume previous diet.                        - Continue present medications.                        - Await pathology results.                        - Repeat colonoscopy in 10 years for screening                         purposes.    CT abdomen with contrast ( 01/14/18 ) :    Urinary bladder wall thickening, consistent with cystitis or chronic bladder outlet obstruction.  Clinical correlation is advised.    Kidneys demonstrate mild parenchymal loss and small cysts but no stone, solid mass, or obstruction.          Assessment/Plan:     Active Diagnoses:    Diagnosis Date  Noted POA    PRINCIPAL PROBLEM:  Symptomatic anemia [D64.9] 02/22/2018 Unknown    ANNA (acute kidney injury) [N17.9] 02/22/2018 Yes    Chronic cough [R05] 02/22/2018 Yes    Anemia [D64.9] 01/27/2018 Yes    Anxiety [F41.9] 12/17/2015 Yes    HTN (hypertension) [I10] 11/09/2014 Yes     Chronic      Problems Resolved During this Admission:    Diagnosis Date Noted Date Resolved POA     # Anemia   - recurrent in nature and received one unit blood during recent admission on 01/27/18  - Hgb dropped from 8.5 to ~7 within one month   - no evidence of blood loss or hemolysis ( normal bilirubin and haptoglobin recently )  - EGD and colonoscopy recently on 02/01/18 showed erosive gastropathy w/o bleeding   - Iron studies not consistent with TANISHA ( normocytic, normal RDW and elevated ferritin )  - scheduled for heme/onc clinic next month for further evaluation   - CT abdomen/pelvis last month negative for malignancy   - negative hep panel and HIV last month   - given persistent cough, weight loss, occasional low grade fever and night sweats will check CT chest, HCV RNA,  and quantiferon gold for further evaluation   - follow up with post transfusion Hgb and transfuse prn Hgb < 7    # Indwelling lackey for Urinary retention   - placed about a month ago by urology   - currently on 7 days course of bactrim for UTI   - does not appear toxic and lackey draining clear straw colored urine   - continue flomax  - scheduled for urodynamic study this morning at 9:30 am   - NPO for urology procedure in am     # ANNA   - creatinine up from baseline 1.1 to 2  - likely pre renal from decreased PO intake   - will give NS 1 liter   - monitor renal function closely and avoid nephrotoxic agents     # HTN   - controlled on amlodipine     # essential tremor   - on remeron     # anxiety   - low dose xanax prn     # Cough  - persistent dry cough since URI around roseanne   - CXR w/o acute process   - phenergan w codeine and duonebs prn cough   - CT  chest as mentioned above     VTE Risk Mitigation         Ordered     Medium Risk of VTE  Once      02/22/18 0343     Place sequential compression device  Until discontinued      02/22/18 0343     Place MOHAN hose  Until discontinued      02/22/18 0343            Lincoln Dave DO  Department of Hospital Medicine   [facility]

## 2018-02-22 NOTE — ED TRIAGE NOTES
Pt arrives to the ED via personal transport with a CC of an abnormal lab. Pt reported that the his primary physician called him and told him he needed to come in and be checked for anemia. Pt reports having fever and a non-productive cough for a couple of weeks. Pt denies NVD. Pt is alert and oriented and in no acute distress at this time.

## 2018-02-22 NOTE — TELEPHONE ENCOUNTER
KEVIN oncology appt scheduled to see MD at [facility] for 2/26/18  I briefly spoke to the pt wife - pt is currently inpatient and we may need to cancel the 2 oncology appts he currently has

## 2018-02-22 NOTE — ASSESSMENT & PLAN NOTE
Unclear if this is still post-viral cough so would continue to treat for allergic rhinitis. No reflux symptoms but if he expresses any then would treat for this. Do not find a reason for his cough based on his CT findings of small nodules which appear consistent with infection/inflammation. Would add duo-nebs scheduled to assess if there is any bronchitic component. Has a pulmonary clinic appointment on 2/26 and will need PFT's at this visit.

## 2018-02-22 NOTE — ED NOTES
Assumed care of patient. Patient resting comfortably. Patient in NAD and offers no complaints at this time. Bed placed in low/locked position, side rails up x 2, call light within reach, plan of care provided to patient/family, alarms set and turned on for monitoring.  Awaiting additional orders and/or results; will continue to monitor.

## 2018-02-22 NOTE — ASSESSMENT & PLAN NOTE
As above. Will follow up in pulmonary clinic to discuss timing of repeat imaging as an outpatient.

## 2018-02-23 LAB
BASOPHILS # BLD AUTO: 0.01 K/UL
BASOPHILS # BLD AUTO: 0.02 K/UL
BASOPHILS NFR BLD: 0.2 %
BASOPHILS NFR BLD: 0.3 %
BLD PROD TYP BPU: NORMAL
BLOOD UNIT EXPIRATION DATE: NORMAL
BLOOD UNIT TYPE CODE: 6200
BLOOD UNIT TYPE: NORMAL
CODING SYSTEM: NORMAL
DAT IGG-SP REAG RBC-IMP: NORMAL
DIFFERENTIAL METHOD: ABNORMAL
DIFFERENTIAL METHOD: ABNORMAL
DISPENSE STATUS: NORMAL
EOSINOPHIL # BLD AUTO: 0.1 K/UL
EOSINOPHIL # BLD AUTO: 0.2 K/UL
EOSINOPHIL NFR BLD: 1.6 %
EOSINOPHIL NFR BLD: 2.5 %
ERYTHROCYTE [DISTWIDTH] IN BLOOD BY AUTOMATED COUNT: 14.5 %
ERYTHROCYTE [DISTWIDTH] IN BLOOD BY AUTOMATED COUNT: 14.6 %
HAV IGM SERPL QL IA: NEGATIVE
HBV CORE IGM SERPL QL IA: NEGATIVE
HBV SURFACE AG SERPL QL IA: NEGATIVE
HCT VFR BLD AUTO: 21.8 %
HCT VFR BLD AUTO: 24.5 %
HCV AB SERPL QL IA: NEGATIVE
HCV LOG: <1.08 LOG (10) IU/ML
HCV RNA QUANT PCR: <12 IU/ML
HCV, QUALITATIVE: NOT DETECTED IU/ML
HGB BLD-MCNC: 7.1 G/DL
HGB BLD-MCNC: 7.9 G/DL
IMM GRANULOCYTES # BLD AUTO: 0.04 K/UL
IMM GRANULOCYTES # BLD AUTO: 0.05 K/UL
IMM GRANULOCYTES NFR BLD AUTO: 0.6 %
IMM GRANULOCYTES NFR BLD AUTO: 0.8 %
LYMPHOCYTES # BLD AUTO: 0.6 K/UL
LYMPHOCYTES # BLD AUTO: 0.6 K/UL
LYMPHOCYTES NFR BLD: 8.7 %
LYMPHOCYTES NFR BLD: 8.8 %
MCH RBC QN AUTO: 28.7 PG
MCH RBC QN AUTO: 28.7 PG
MCHC RBC AUTO-ENTMCNC: 32.2 G/DL
MCHC RBC AUTO-ENTMCNC: 32.6 G/DL
MCV RBC AUTO: 88 FL
MCV RBC AUTO: 89 FL
MITOGEN NIL: 0.2 IU/ML
MONOCYTES # BLD AUTO: 0.5 K/UL
MONOCYTES # BLD AUTO: 0.5 K/UL
MONOCYTES NFR BLD: 7.3 %
MONOCYTES NFR BLD: 8 %
NEUTROPHILS # BLD AUTO: 5.1 K/UL
NEUTROPHILS # BLD AUTO: 5.2 K/UL
NEUTROPHILS NFR BLD: 80.6 %
NEUTROPHILS NFR BLD: 80.6 %
NIL: 0.04 IU/ML
NRBC BLD-RTO: 0 /100 WBC
NRBC BLD-RTO: 0 /100 WBC
PATH REV BLD -IMP: NORMAL
PATH REV BLD -IMP: NORMAL
PLATELET # BLD AUTO: 149 K/UL
PLATELET # BLD AUTO: 179 K/UL
PMV BLD AUTO: 10.1 FL
PMV BLD AUTO: 10.1 FL
RBC # BLD AUTO: 2.47 M/UL
RBC # BLD AUTO: 2.75 M/UL
TB ANTIGEN NIL: -0.01 IU/ML
TB ANTIGEN: 0.03 IU/ML
TB GOLD: ABNORMAL
TRANS ERYTHROCYTES VOL PATIENT: NORMAL ML
VIT B12 SERPL-MCNC: 348 PG/ML
WBC # BLD AUTO: 6.26 K/UL
WBC # BLD AUTO: 6.43 K/UL

## 2018-02-23 PROCEDURE — 82607 VITAMIN B-12: CPT

## 2018-02-23 PROCEDURE — 84165 PROTEIN E-PHORESIS SERUM: CPT | Mod: 26,,, | Performed by: PATHOLOGY

## 2018-02-23 PROCEDURE — 82272 OCCULT BLD FECES 1-3 TESTS: CPT

## 2018-02-23 PROCEDURE — 25000003 PHARM REV CODE 250: Performed by: HOSPITALIST

## 2018-02-23 PROCEDURE — 83520 IMMUNOASSAY QUANT NOS NONAB: CPT | Mod: 59

## 2018-02-23 PROCEDURE — 99222 1ST HOSP IP/OBS MODERATE 55: CPT | Mod: GC,,, | Performed by: INTERNAL MEDICINE

## 2018-02-23 PROCEDURE — 84238 ASSAY NONENDOCRINE RECEPTOR: CPT

## 2018-02-23 PROCEDURE — 86880 COOMBS TEST DIRECT: CPT

## 2018-02-23 PROCEDURE — 85025 COMPLETE CBC W/AUTO DIFF WBC: CPT

## 2018-02-23 PROCEDURE — 80074 ACUTE HEPATITIS PANEL: CPT

## 2018-02-23 PROCEDURE — 84165 PROTEIN E-PHORESIS SERUM: CPT

## 2018-02-23 PROCEDURE — 85060 BLOOD SMEAR INTERPRETATION: CPT | Mod: ,,, | Performed by: PATHOLOGY

## 2018-02-23 PROCEDURE — P9021 RED BLOOD CELLS UNIT: HCPCS

## 2018-02-23 PROCEDURE — 94761 N-INVAS EAR/PLS OXIMETRY MLT: CPT

## 2018-02-23 PROCEDURE — 99233 SBSQ HOSP IP/OBS HIGH 50: CPT | Mod: ,,, | Performed by: HOSPITALIST

## 2018-02-23 PROCEDURE — 86334 IMMUNOFIX E-PHORESIS SERUM: CPT

## 2018-02-23 PROCEDURE — 86920 COMPATIBILITY TEST SPIN: CPT

## 2018-02-23 PROCEDURE — 25000242 PHARM REV CODE 250 ALT 637 W/ HCPCS: Performed by: INTERNAL MEDICINE

## 2018-02-23 PROCEDURE — 86334 IMMUNOFIX E-PHORESIS SERUM: CPT | Mod: 26,,, | Performed by: PATHOLOGY

## 2018-02-23 PROCEDURE — 94640 AIRWAY INHALATION TREATMENT: CPT

## 2018-02-23 PROCEDURE — 36415 COLL VENOUS BLD VENIPUNCTURE: CPT

## 2018-02-23 PROCEDURE — 11000001 HC ACUTE MED/SURG PRIVATE ROOM

## 2018-02-23 RX ORDER — AMOXICILLIN 250 MG
2 CAPSULE ORAL DAILY
Status: DISCONTINUED | OUTPATIENT
Start: 2018-02-23 | End: 2018-02-25

## 2018-02-23 RX ORDER — IPRATROPIUM BROMIDE AND ALBUTEROL SULFATE 2.5; .5 MG/3ML; MG/3ML
3 SOLUTION RESPIRATORY (INHALATION) EVERY 4 HOURS
Qty: 1 BOX | Refills: 0 | Status: SHIPPED | OUTPATIENT
Start: 2018-02-23 | End: 2018-03-06 | Stop reason: HOSPADM

## 2018-02-23 RX ORDER — IPRATROPIUM BROMIDE AND ALBUTEROL SULFATE 2.5; .5 MG/3ML; MG/3ML
3 SOLUTION RESPIRATORY (INHALATION) EVERY 4 HOURS PRN
Status: DISCONTINUED | OUTPATIENT
Start: 2018-02-24 | End: 2018-03-07 | Stop reason: HOSPADM

## 2018-02-23 RX ORDER — AMOXICILLIN 250 MG
2 CAPSULE ORAL DAILY
COMMUNITY
Start: 2018-02-24 | End: 2018-03-06

## 2018-02-23 RX ORDER — HYDROCODONE BITARTRATE AND ACETAMINOPHEN 500; 5 MG/1; MG/1
TABLET ORAL
Status: DISCONTINUED | OUTPATIENT
Start: 2018-02-23 | End: 2018-02-25

## 2018-02-23 RX ORDER — SULFAMETHOXAZOLE AND TRIMETHOPRIM 800; 160 MG/1; MG/1
1 TABLET ORAL ONCE
Status: COMPLETED | OUTPATIENT
Start: 2018-02-23 | End: 2018-02-24

## 2018-02-23 RX ORDER — EPINEPHRINE 0.3 MG/.3ML
0.3 INJECTION SUBCUTANEOUS
Status: DISCONTINUED | OUTPATIENT
Start: 2018-02-23 | End: 2018-02-25

## 2018-02-23 RX ADMIN — IPRATROPIUM BROMIDE AND ALBUTEROL SULFATE 3 ML: .5; 3 SOLUTION RESPIRATORY (INHALATION) at 08:02

## 2018-02-23 RX ADMIN — CETIRIZINE HYDROCHLORIDE 10 MG: 10 TABLET, FILM COATED ORAL at 09:02

## 2018-02-23 RX ADMIN — ALPRAZOLAM 0.25 MG: 0.25 TABLET ORAL at 09:02

## 2018-02-23 RX ADMIN — FLUTICASONE PROPIONATE 50 MCG: 50 SPRAY, METERED NASAL at 09:02

## 2018-02-23 RX ADMIN — TAMSULOSIN HYDROCHLORIDE 0.8 MG: 0.4 CAPSULE ORAL at 09:02

## 2018-02-23 RX ADMIN — MIRTAZAPINE 30 MG: 15 TABLET, FILM COATED ORAL at 09:02

## 2018-02-23 RX ADMIN — STANDARDIZED SENNA CONCENTRATE AND DOCUSATE SODIUM 2 TABLET: 8.6; 5 TABLET, FILM COATED ORAL at 11:02

## 2018-02-23 RX ADMIN — PROMETHAZINE HYDROCHLORIDE AND CODEINE PHOSPHATE 5 ML: 10; 6.25 SOLUTION ORAL at 07:02

## 2018-02-23 RX ADMIN — IPRATROPIUM BROMIDE AND ALBUTEROL SULFATE 3 ML: .5; 3 SOLUTION RESPIRATORY (INHALATION) at 04:02

## 2018-02-23 RX ADMIN — PROMETHAZINE HYDROCHLORIDE AND CODEINE PHOSPHATE 5 ML: 10; 6.25 SOLUTION ORAL at 11:02

## 2018-02-23 RX ADMIN — SODIUM CHLORIDE: 0.9 INJECTION, SOLUTION INTRAVENOUS at 05:02

## 2018-02-23 RX ADMIN — PROMETHAZINE HYDROCHLORIDE AND CODEINE PHOSPHATE 5 ML: 10; 6.25 SOLUTION ORAL at 01:02

## 2018-02-23 RX ADMIN — AMLODIPINE BESYLATE 10 MG: 10 TABLET ORAL at 09:02

## 2018-02-23 RX ADMIN — SODIUM CHLORIDE: 0.9 INJECTION, SOLUTION INTRAVENOUS at 07:02

## 2018-02-23 RX ADMIN — IPRATROPIUM BROMIDE AND ALBUTEROL SULFATE 3 ML: .5; 3 SOLUTION RESPIRATORY (INHALATION) at 11:02

## 2018-02-23 RX ADMIN — ACETAMINOPHEN 650 MG: 325 TABLET, FILM COATED ORAL at 09:02

## 2018-02-23 RX ADMIN — SULFAMETHOXAZOLE AND TRIMETHOPRIM 1 TABLET: 800; 160 TABLET ORAL at 09:02

## 2018-02-23 RX ADMIN — IPRATROPIUM BROMIDE AND ALBUTEROL SULFATE 3 ML: .5; 3 SOLUTION RESPIRATORY (INHALATION) at 09:02

## 2018-02-23 NOTE — CONSULTS
[facility]  Hematology/Oncology  Consult Note    Patient Name: Rodolfo Messina  MRN: [id number]  Admission Date: 2/21/2018  Hospital Length of Stay: 2 days  Code Status: Full Code   Attending Provider: Babak Bran MD  Consulting Provider: Melissa Borrego MD  Primary Care Physician: Deric Claudio MD  Principal Problem:Symptomatic anemia    Inpatient consult to Hematology/Oncology  Consult performed by: MELISSA BORREGO  Consult ordered by: BABAK BRAN        Subjective:     HPI: Mr. Messina is a 66 y/o  Male with PMHx of HTN, BPH with intermittent indwelling lackey, Anemia, and a chronic cough. He presents to ED with recurrent anemia revealed in labs done by PCP yesterday. Hgb dropped from recent 8 to 7.  Patient was recently admitted overnight 01/27/18 - 01/28/18 for symptomatic anemia associated with fatigue. He received one unit blood transfusion with improved Hgb from 7 to 8.5. Follow up EGD and colonoscopy on 02/01/18 showed non bleeding erosive gastropathy, internal hemorrhoid and diverticulosis in sigmoid and descending colon. He has been scheduled to follow up with heme oncology clinic next month for evaluation of anemia. Patient denies dark tarry stool, BRBPR, hematuria, hemoptysis or bleeding from any other sites. FOBT negative in ED. He reports persistent dry cough since around roseanne when he developed URI. Also reports occasional low grade fever and night sweats. Notes fatigue, decreased appetite and 10 lb weight loss since roseanne. Denies chest pain, palpitation, dizziness, dysuria, heart burn, abdominal pain, NSAID use. 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 He received one unit PRBC overnight in ED. He is currently on bactrim prescribed by urology for recent UTI ( end date 02/26/18 ). Hematology consulted for evaluation of Anemia.       Medications:  Continuous Infusions:   sodium chloride  0.9% 100 mL/hr at 02/22/18 2043     Scheduled Meds:   albuterol-ipratropium 2.5mg-0.5mg/3mL  3 mL Nebulization Q4H WAKE    amLODIPine  10 mg Oral Daily    cetirizine  10 mg Oral Daily    fluticasone  1 spray Each Nare BID    mirtazapine  30 mg Oral QHS    sulfamethoxazole-trimethoprim 800-160mg  1 tablet Oral BID    tamsulosin  0.8 mg Oral QHS     PRN Meds:sodium chloride, acetaminophen, albuterol-ipratropium 2.5mg-0.5mg/3mL, ALPRAZolam, dextrose 50%, dextrose 50%, dextrose 50%, dextrose 50%, glucagon (human recombinant), glucagon (human recombinant), glucose, glucose, glucose, glucose, ondansetron, promethazine-codeine 6.25-10 mg/5 ml, sodium chloride 0.9%, sodium chloride 0.9%, traMADol     Review of patient's allergies indicates:   Allergen Reactions    Ciprofloxacin     Flagyl [metronidazole]     Lisinopril Other (See Comments)     cough    Mysoline [primidone]     Omnicef [cefdinir]     Shellfish containing products         Past Medical History:   Diagnosis Date    ANNA (acute kidney injury) 2/22/2018    Anemia     Anxiety     BPH (benign prostatic hyperplasia)     Diverticulosis     Hypertension     Urinary retention      Past Surgical History:   Procedure Laterality Date    APPENDECTOMY      COLONOSCOPY      COLONOSCOPY N/A 2/1/2018    Procedure: COLONOSCOPY;  Surgeon: Richar Payan MD;  Location: [address];  Service: Endoscopy;  Laterality: N/A;  PM prep    EYE SURGERY Right     cataract extraction    FINGER SURGERY      FRACTURE SURGERY Right     index finger    TONSILLECTOMY       Family History     Problem Relation (Age of Onset)    Heart disease Mother, Father    No Known Problems Sister, Brother    Stroke Mother        Social History Main Topics    Smoking status: Never Smoker    Smokeless tobacco: Never Used    Alcohol use Yes      Comment: none recently    Drug use: No    Sexual activity: Not Currently     Partners: Female       Review of Systems   GEN:  NAD, No fever, chills, nightsweats  HEENT: Denies HA, rhinorrhea, throat pain or erythema, choking or itchy/red eyes  PULM: chronic cough  CVS: denies palpitations  GI/: Denies N/V/abdominal pain, no blood in stool, no hematuria  HEM/ONC: No weight changes, hemetemesis, hematochezia, hematuria  SKIN: No skin changes or rashes.   RHEUM: No joint pain, stiffness  MUSKL: normal gait.   PSYCH: Denies suicidal ideation/homocidal ideation, visual or auditory hallucination.     Objective:     Vital Signs (Most Recent):  Temp: 98.8 °F (37.1 °C) (02/23/18 0046)  Pulse: 106 (02/23/18 0046)  Resp: 18 (02/22/18 2056)  BP: 130/64 (02/23/18 0046)  SpO2: (!) 94 % (02/23/18 0046) Vital Signs (24h Range):  Temp:  [97.8 °F (36.6 °C)-98.8 °F (37.1 °C)] 98.8 °F (37.1 °C)  Pulse:  [] 106  Resp:  [18-20] 18  SpO2:  [94 %-95 %] 94 %  BP: (125-137)/(57-64) 130/64     Weight: 74.4 kg (164 lb)  Body mass index is 24.22 kg/m².  Body surface area is 1.9 meters squared.      Intake/Output Summary (Last 24 hours) at 02/23/18 0503  Last data filed at 02/22/18 1800   Gross per 24 hour   Intake              240 ml   Output             2700 ml   Net            -2460 ml       Physical Exam  General: NAD, cooperative & interactive. Appears pale   HEENT: AT/NC, PERRL, EOMI, oral mucosa moist.   Neck: Supple and no palpable LAD.   Cardiac: RRR   Respiratory: CTA bilaterally  Abdomen: Soft, NT/ND. +BS.   Extremities: No edema.   Skin: no rashes or lesions  Neuro: Essential Tremor    Significant Labs:   CBC:   Recent Labs  Lab 02/21/18  2132 02/22/18  0612 02/22/18  1912   WBC 5.43 7.09 6.10   HGB 6.7* 7.3* 7.2*   HCT 20.5* 22.2* 22.0*   * 159 164    and CMP:   Recent Labs  Lab 02/21/18  2132 02/22/18  0612   * 135*   K 4.8 4.6    104   CO2 20* 22*    127*   BUN 33* 27*   CREATININE 2.0* 1.9*   CALCIUM 8.7 8.7   PROT 6.9 6.4   ALBUMIN 2.8* 2.6*   BILITOT 0.4 1.3*   ALKPHOS 82 76   AST 21 21   ALT 15 13   ANIONGAP 11 9    EGFRNONAA 33.5* 35.7*       Diagnostic Results:  I have reviewed all pertinent imaging results/findings within the past 24 hours.      CT Abdomen/Pelvis 1/14/18:    Liver: Normal in size and attenuation, with no large focal hepatic lesions. Subcentimeter foci of hypodensities, too small to characterize and likely representing simple cysts.      Spleen: Unremarkable.     Kidneys/ Ureters: Normal in size and location with mild parenchymal loss. Normal concentration and excretion of contrast. No hydronephrosis or nephrolithiasis. No ureteral dilatation. Multiple bilateral hypodensities, likely representing simple renal cysts.    Bladder: An outpouching from the superior margin, likely resenting bladder or urachal diverticulum.  The bladder is distended with evidence of wall thickening and minimal pericystic fat stranding, consistent with cystitis or chronic bladder outlet obstruction, clinical correlation is advised.       CT Chest 2/22/18:    Left lobe of the thyroid is heterogenous and upper limit of normal for size.  Right lobe is unremarkable.    Mediastinum demonstrates upper limit of normal sized lymph nodes.    Esophagus maintains normal caliber and course.  Splenomegaly.     Lungs are symmetrically expanded.  There are multiple nodules and micronodular clusters seen throughout the right lung.  Largest focal opacity seen within the posterior segment of the right upper lobe measures 1.0 cm (axial series 3 image 147). There is interlobular septal thickening and mild bronchiectasis bilaterally and most prominent in the lower lung zones.     Assessment/Plan:     Active Diagnoses:    Diagnosis Date Noted POA    PRINCIPAL PROBLEM:  Symptomatic anemia [D64.9] 02/22/2018 Yes    ANNA (acute kidney injury) [N17.9] 02/22/2018 Yes    Chronic cough [R05] 02/22/2018 Yes    Pulmonary nodules [R91.8] 02/22/2018 Yes    Mild malnutrition [E44.1] 02/22/2018 Yes    Anemia [D64.9] 01/27/2018 Yes    Anxiety [F41.9] 12/17/2015  Yes    HTN (hypertension) [I10] 11/09/2014 Yes     Chronic      Problems Resolved During this Admission:    Diagnosis Date Noted Date Resolved POA     Normocytic Anemia  - prior to 2018, the last Hb/Hct for comparison was in December 2015, which showed mild normocytic anemia of 12.5 g/dL.  - not associated with other cytopenias. However, does have renal insufficiency which has worsened in the last one month.   - EGD/Colonoscopy on 2/1/18 only showed non-erosive gastropathy (mild chronic gastritis), internal hemorrhoids, and diverticulosis.  - CT of Abdomen/Pelvis in January 2018, did not reveal any obvious hepatosplenomegaly, lymphadenopathy or abnormal bony lesions.    - nutritional studies, TSH, and hemolysis panel unremarkable thus far. Will add Vit B12 and Soluble Transferrin Receptor.   - his reticulocyte index inappropriate low, which warrants further evaluation.   - will review peripheral smear and plasma cell dyscrasia/viral studies ordered.  - would advise to keep his Hematology Appt for 2/27 at 1 pm with Dr. Melton and follow up in clinic to review lab work and discuss bone marrow evaluation if deemed necessary at that time.         Multiple Pulmonary Nodules/Chronic Cough  - f/u in Pulmonary Clinic for evaluation and surveillance       Thank you for your consult. I will follow-up with patient. Please contact us if you have any additional questions.    Bob Borrego MD  Hematology/Oncology  [facility]

## 2018-02-23 NOTE — PROGRESS NOTES
Progress Note  Hospital Medicine    Admit Date: 2/21/2018  Length of Stay:  LOS: 2 days     SUBJECTIVE:         Follow-up For:  Symptomatic anemia    HPI/Interval history (See H&P for complete P,F,SHx) :     2/22/18  s/p unit of PRBC today. Hb 6.7--> 7.3. discussed with GI.Negative FOBT in ER. No melena. Recommended supportive care with blood transfusions. capsule study as outpatient. CT chest -Multiple nodules and micronodular clusters seen throughout the right lung. Largest focal opacity is within the posterior segment of the right upper lobe measuring 1.0 cm.infectious versus noninfectious inflammation; however, underlying malignancy cannot be entirely excluded. Pulmonology consulted     02/23/18  ANNA on bactrim, bactrim discontinued ,  allergic to cipro and cefdinir. Renal sonogram to rule out hydronephrosis . ID consulted. 1 unit of  PRBC transfusion for Hb of 7.1      Review of Systems: List if applicable  Pain scale: 0/10  Constitutional- Positive for  Weakness, wt loss, intermittent night sweats  Resp- Positive for dry Cough, SOB  GI- Positive for Nausea, poor oral intake     OBJECTIVE:     Vital Signs Range (Last 24H):  Temp:  [97.7 °F (36.5 °C)-98.8 °F (37.1 °C)]   Pulse:  []   Resp:  [16-20]   BP: (128-135)/(61-65)   SpO2:  [90 %-96 %]     Physical Exam:  General- Patient alert and oriented x3   HEENT- PERRLA, EOMI, OP clear  Neck- No JVD, Lymphadenopathy, Thyromegaly  CV- Regular rate and rhythm, No Murmur/francia/rubs  Resp- Lungs CTA Bilaterally, No increased WOB  Abdomen- Non tender/non-distended, BS normoactive x4 quads, no HSM  Extrem- No cyanosis, clubbing, edema.   Skin- No rashes, lesions, ulcers  Neuro- able to move upper and lower extremities without limitation      Medications:  Medication list was reviewed and changes noted under Assessment/Plan.      Current Facility-Administered Medications:     0.9%  NaCl infusion (for blood administration), , Intravenous, Q24H PRN, Terry CALDERÓN  MD Rico    0.9%  NaCl infusion, , Intravenous, Continuous, Babak Magallanes MD, Last Rate: 100 mL/hr at 02/23/18 0547    acetaminophen tablet 650 mg, 650 mg, Oral, Q6H PRN, Arup K. Jolie, DO    albuterol-ipratropium 2.5mg-0.5mg/3mL nebulizer solution 3 mL, 3 mL, Nebulization, Q4H PRN, Arup K. Jolie, DO    albuterol-ipratropium 2.5mg-0.5mg/3mL nebulizer solution 3 mL, 3 mL, Nebulization, Q4H WAKE, Nata Sutherland MD, 3 mL at 02/23/18 1152    ALPRAZolam tablet 0.25 mg, 0.25 mg, Oral, Nightly PRN, Arup K. Jolie, DO, 0.25 mg at 02/22/18 2045    amLODIPine tablet 10 mg, 10 mg, Oral, Daily, Arup K. Jolie, DO, 10 mg at 02/23/18 0943    cetirizine tablet 10 mg, 10 mg, Oral, Daily, Arup K. Jolie, DO, 10 mg at 02/23/18 0943    dextrose 50% injection 12.5 g, 12.5 g, Intravenous, PRN, Terry Gómez MD    dextrose 50% injection 12.5 g, 12.5 g, Intravenous, PRN, Arup K. Jolie, DO    dextrose 50% injection 25 g, 25 g, Intravenous, PRN, Terry Gómez MD    dextrose 50% injection 25 g, 25 g, Intravenous, PRN, Arup K. Jolie, DO    fluticasone 50 mcg/actuation nasal spray 50 mcg, 1 spray, Each Nare, BID, Arup K. Jolie, DO, 50 mcg at 02/23/18 0943    glucagon (human recombinant) injection 1 mg, 1 mg, Intramuscular, PRN, Terry Gómez MD    glucagon (human recombinant) injection 1 mg, 1 mg, Intramuscular, PRN, Arup K. Jolie, DO    glucose chewable tablet 16 g, 16 g, Oral, PRN, Terry Gómez MD    glucose chewable tablet 16 g, 16 g, Oral, PRN, Arup K. Jolie, DO    glucose chewable tablet 24 g, 24 g, Oral, PRN, Terry Gómez MD    glucose chewable tablet 24 g, 24 g, Oral, PRN, Lincoln Dave, DO    mirtazapine tablet 30 mg, 30 mg, Oral, QHS, Lincoln Dave, DO, 30 mg at 02/22/18 2044    ondansetron injection 4 mg, 4 mg, Intravenous, Q6H PRN, Lincoln Dave, DO    promethazine-codeine 6.25-10 mg/5 ml syrup 5 mL, 5 mL, Oral, Q4H PRN, Lincoln Dave, DO, 5 mL at 02/23/18 1125    senna-docusate 8.6-50 mg per  tablet 2 tablet, 2 tablet, Oral, Daily, Babak Magallanes MD, 2 tablet at 02/23/18 1124    sodium chloride 0.9% flush 5 mL, 5 mL, Intravenous, PRN, Terry Gómez MD    sodium chloride 0.9% flush 5 mL, 5 mL, Intravenous, PRN, Arup K. Jolie, DO    tamsulosin 24 hr capsule 0.8 mg, 0.8 mg, Oral, QHS, Arup K. Jolie, DO, 0.8 mg at 02/22/18 2045    traMADol tablet 50 mg, 50 mg, Oral, Q6H PRN, Arup K. Jolie, DO    sodium chloride, acetaminophen, albuterol-ipratropium 2.5mg-0.5mg/3mL, ALPRAZolam, dextrose 50%, dextrose 50%, dextrose 50%, dextrose 50%, glucagon (human recombinant), glucagon (human recombinant), glucose, glucose, glucose, glucose, ondansetron, promethazine-codeine 6.25-10 mg/5 ml, sodium chloride 0.9%, sodium chloride 0.9%, traMADol    Laboratory/Diagnostic Data:  Reviewed and noted in plan where applicable- Please see chart for full lab data.      Recent Labs  Lab 02/22/18 0612 02/22/18 1912 02/23/18  0722   WBC 7.09 6.10 6.43   HGB 7.3* 7.2* 7.9*   HCT 22.2* 22.0* 24.5*    164 179         Recent Labs  Lab 02/20/18  0935 02/21/18 2132 02/22/18  0612   * 132* 135*   K 4.4 4.8 4.6    101 104   CO2 27 20* 22*   BUN 30* 33* 27*   CREATININE 1.88* 2.0* 1.9*   * 106 127*   CALCIUM 8.4* 8.7 8.7   MG  --   --  2.2   PHOS  --   --  3.7         Recent Labs  Lab 02/20/18  0935 02/21/18 2132 02/22/18  0612   ALKPHOS 64 82 76   ALT 11 15 13   AST 16 21 21   ALBUMIN 2.9* 2.8* 2.6*   PROT 5.9* 6.9 6.4   BILITOT 0.6 0.4 1.3*   INR  --  1.1  --         Microbiology labs for the last week  Microbiology Results (last 7 days)     ** No results found for the last 168 hours. **           Imaging Results          X-Ray Chest PA And Lateral (Final result)  Result time 02/21/18 21:59:42    Final result by Pradeep Peralta MD (02/21/18 21:59:42)                 Impression:     No acute cardiopulmonary abnormality.      Electronically signed by: Pradeep Peralta  Date:     02/21/18  Time:    21:59   "            Narrative:    EXAM:  2 VIEW CHEST RADIOGRAPH.     CLINICAL INDICATION:  Cough.    COMPARISON: 01/27/2018.    TECHNIQUE:  Two views of the chest were obtained.     FINDINGS: Cardiac silhouette is not significantly enlarged and stable in size.  Minimal linear subsegmental atelectasis at the lung bases. No focal consolidation.  No pleural effusion or pneumothorax.  No acute bony abnormality is identified.                              Estimated body mass index is 24.22 kg/m² as calculated from the following:    Height as of this encounter: 5' 9" (1.753 m).    Weight as of this encounter: 74.4 kg (164 lb).    I & O (Last 24H):    Intake/Output Summary (Last 24 hours) at 02/23/18 1451  Last data filed at 02/23/18 0551   Gross per 24 hour   Intake              150 ml   Output             1650 ml   Net            -1500 ml       Estimated Creatinine Clearance: 37.7 mL/min (A) (based on SCr of 1.9 mg/dL (H)).    ASSESSMENT/PLAN:     Active Problems:    Active Hospital Problems    Diagnosis  POA    *Symptomatic anemia [D64.9]EGD and colonoscopy recently on 02/01/18 showed erosive gastropathy w/o bleeding.   S/p unit of PRBC today. Hb 6.7--> 7.3.-->7.1 discussed with GI.Negative FOBT in ER. No melena. Recommended supportive care with blood transfusions. capsule study as outpatient.  Yes    ANNA (acute kidney injury) [N17.9]Cr 1.2 --> 1.9. likley prerrena vs ATN. no improvement with  IV hydration. ANNA on bactrim, bactrim discontinued ,  allergic to cipro and cefdinir. Renal sonogram to rule out hydronephrosis . ID consulted     Yes    Chronic cough [R05]dry .CT chest -Multiple nodules and micronodular clusters seen throughout the right lung. Largest focal opacity is within the posterior segment of the right upper lobe measuring 1.0 cm.infectious versus noninfectious inflammation; however, underlying malignancy cannot be entirely excluded. Pulmonology consulted . history of exposure to asbestos    Indwelling lackey " for Urinary retention -currently on 2/ 7 days course of bactrim for UTI . urine C/S from 2/14 - proteus .urodynamic study canceled and scheduled as outpatient      Essential tremor -  on remeron . follows up with neurology    Malnutrition - prealbumin of 12. Dietary consulted     Weight loss, night sweats - 10lb .CT abdomen/pelvis last month negative for malignancy .quantiferon gold for further evaluation   Yes    Anxiety [F41.9]xanax prn   Yes    HTN (hypertension) [I10]- controlled on amlodipine   Yes     Chronic      Resolved Hospital Problems    Diagnosis Date Resolved POA   No resolved problems to display.         Disposition- Home    DVT prophylaxis addressed with: B/L SCD            Babak Magallanes MD  Attending Staff Physician  [facility] Medicine  pager- [phone] [facility] - [phone]

## 2018-02-23 NOTE — PROCEDURES
Interpretation:     This study meets ATS standards for interpretation.     No obstruction.   The FVC is slightly decreased however this is likely secondary to a shortened expiratory maneuver. (2.19 seconds vs. Usually 6 seconds).   The short expiratory time may underestimate obstruction.    Shelby Victor  Pulmonary Critical Care fellow.   [employer].     Pulmonary/CCM Attending    Reviewed and verified.    Shonda Briseno MD  2/23/2018  8:30 PM

## 2018-02-23 NOTE — NURSING
Pt remains aaox4. No acute changes overnight. Received PRN cough medication x2 with minimal effectiveness. No active bleeding noted/reported. No black stool reported. Vital signs stable. No s/s of distress. Spouse at bedside.

## 2018-02-24 ENCOUNTER — TELEPHONE (OUTPATIENT)
Dept: FAMILY MEDICINE | Facility: CLINIC | Age: 68
End: 2018-02-24

## 2018-02-24 PROBLEM — J96.01 ACUTE HYPOXEMIC RESPIRATORY FAILURE: Status: ACTIVE | Noted: 2018-02-24

## 2018-02-24 PROBLEM — R82.71 ASYMPTOMATIC BACTERIURIA: Status: ACTIVE | Noted: 2018-02-24

## 2018-02-24 PROBLEM — R06.02 SHORTNESS OF BREATH: Status: ACTIVE | Noted: 2018-02-24

## 2018-02-24 LAB
ALBUMIN SERPL BCP-MCNC: 2.6 G/DL
ALP SERPL-CCNC: 78 U/L
ALT SERPL W/O P-5'-P-CCNC: 26 U/L
ANION GAP SERPL CALC-SCNC: 8 MMOL/L
AST SERPL-CCNC: 36 U/L
BASOPHILS # BLD AUTO: 0.01 K/UL
BASOPHILS NFR BLD: 0.1 %
BILIRUB SERPL-MCNC: 1.5 MG/DL
BNP SERPL-MCNC: 307 PG/ML
BUN SERPL-MCNC: 22 MG/DL
CALCIUM SERPL-MCNC: 8.6 MG/DL
CHLORIDE SERPL-SCNC: 110 MMOL/L
CO2 SERPL-SCNC: 19 MMOL/L
CREAT SERPL-MCNC: 1.6 MG/DL
DIFFERENTIAL METHOD: ABNORMAL
EOSINOPHIL # BLD AUTO: 0.2 K/UL
EOSINOPHIL NFR BLD: 2 %
ERYTHROCYTE [DISTWIDTH] IN BLOOD BY AUTOMATED COUNT: 18.6 %
EST. GFR  (AFRICAN AMERICAN): 50.8 ML/MIN/1.73 M^2
EST. GFR  (NON AFRICAN AMERICAN): 43.9 ML/MIN/1.73 M^2
GLUCOSE SERPL-MCNC: 113 MG/DL
HCT VFR BLD AUTO: 25.8 %
HGB BLD-MCNC: 8.4 G/DL
IMM GRANULOCYTES # BLD AUTO: 0.04 K/UL
IMM GRANULOCYTES NFR BLD AUTO: 0.4 %
LYMPHOCYTES # BLD AUTO: 0.7 K/UL
LYMPHOCYTES NFR BLD: 6.8 %
MCH RBC QN AUTO: 27.3 PG
MCHC RBC AUTO-ENTMCNC: 32.6 G/DL
MCV RBC AUTO: 84 FL
MONOCYTES # BLD AUTO: 0.7 K/UL
MONOCYTES NFR BLD: 6.8 %
NEUTROPHILS # BLD AUTO: 8.2 K/UL
NEUTROPHILS NFR BLD: 83.9 %
NRBC BLD-RTO: 0 /100 WBC
OB PNL STL: NEGATIVE
PLATELET # BLD AUTO: 158 K/UL
PMV BLD AUTO: 10.1 FL
POTASSIUM SERPL-SCNC: 4.8 MMOL/L
PROT SERPL-MCNC: 6.3 G/DL
RBC # BLD AUTO: 3.08 M/UL
SODIUM SERPL-SCNC: 137 MMOL/L
STFR SERPL-MCNC: 2.5 MG/L
TROPONIN I SERPL DL<=0.01 NG/ML-MCNC: 0.07 NG/ML
WBC # BLD AUTO: 9.74 K/UL

## 2018-02-24 PROCEDURE — 94640 AIRWAY INHALATION TREATMENT: CPT

## 2018-02-24 PROCEDURE — 99223 1ST HOSP IP/OBS HIGH 75: CPT | Mod: ,,, | Performed by: INTERNAL MEDICINE

## 2018-02-24 PROCEDURE — 25000003 PHARM REV CODE 250: Performed by: HOSPITALIST

## 2018-02-24 PROCEDURE — 25000003 PHARM REV CODE 250: Performed by: PHYSICIAN ASSISTANT

## 2018-02-24 PROCEDURE — 85025 COMPLETE CBC W/AUTO DIFF WBC: CPT

## 2018-02-24 PROCEDURE — 99900035 HC TECH TIME PER 15 MIN (STAT)

## 2018-02-24 PROCEDURE — 97802 MEDICAL NUTRITION INDIV IN: CPT

## 2018-02-24 PROCEDURE — 93306 TTE W/DOPPLER COMPLETE: CPT | Mod: 26,,, | Performed by: INTERNAL MEDICINE

## 2018-02-24 PROCEDURE — 99233 SBSQ HOSP IP/OBS HIGH 50: CPT | Mod: ,,, | Performed by: HOSPITALIST

## 2018-02-24 PROCEDURE — 93010 ELECTROCARDIOGRAM REPORT: CPT | Mod: ,,, | Performed by: INTERNAL MEDICINE

## 2018-02-24 PROCEDURE — 93306 TTE W/DOPPLER COMPLETE: CPT

## 2018-02-24 PROCEDURE — 36430 TRANSFUSION BLD/BLD COMPNT: CPT

## 2018-02-24 PROCEDURE — 84484 ASSAY OF TROPONIN QUANT: CPT

## 2018-02-24 PROCEDURE — 36415 COLL VENOUS BLD VENIPUNCTURE: CPT

## 2018-02-24 PROCEDURE — 63600175 PHARM REV CODE 636 W HCPCS: Performed by: HOSPITALIST

## 2018-02-24 PROCEDURE — 83880 ASSAY OF NATRIURETIC PEPTIDE: CPT

## 2018-02-24 PROCEDURE — 25000242 PHARM REV CODE 250 ALT 637 W/ HCPCS: Performed by: INTERNAL MEDICINE

## 2018-02-24 PROCEDURE — 93005 ELECTROCARDIOGRAM TRACING: CPT

## 2018-02-24 PROCEDURE — 80053 COMPREHEN METABOLIC PANEL: CPT

## 2018-02-24 PROCEDURE — 11000001 HC ACUTE MED/SURG PRIVATE ROOM

## 2018-02-24 PROCEDURE — 94761 N-INVAS EAR/PLS OXIMETRY MLT: CPT

## 2018-02-24 PROCEDURE — 97161 PT EVAL LOW COMPLEX 20 MIN: CPT

## 2018-02-24 RX ORDER — LEVALBUTEROL 1.25 MG/.5ML
1.25 SOLUTION, CONCENTRATE RESPIRATORY (INHALATION) EVERY 4 HOURS
Status: DISCONTINUED | OUTPATIENT
Start: 2018-02-25 | End: 2018-02-25

## 2018-02-24 RX ORDER — ACETAMINOPHEN AND CODEINE PHOSPHATE 300; 30 MG/1; MG/1
1 TABLET ORAL EVERY 8 HOURS
Status: DISCONTINUED | OUTPATIENT
Start: 2018-02-24 | End: 2018-02-25

## 2018-02-24 RX ORDER — FUROSEMIDE 10 MG/ML
20 INJECTION INTRAMUSCULAR; INTRAVENOUS ONCE
Status: COMPLETED | OUTPATIENT
Start: 2018-02-24 | End: 2018-02-24

## 2018-02-24 RX ORDER — HYDROXYZINE HYDROCHLORIDE 25 MG/1
25 TABLET, FILM COATED ORAL ONCE
Status: COMPLETED | OUTPATIENT
Start: 2018-02-24 | End: 2018-02-24

## 2018-02-24 RX ORDER — AMOXICILLIN AND CLAVULANATE POTASSIUM 875; 125 MG/1; MG/1
1 TABLET, FILM COATED ORAL EVERY 12 HOURS
Status: DISCONTINUED | OUTPATIENT
Start: 2018-02-24 | End: 2018-02-24

## 2018-02-24 RX ORDER — IPRATROPIUM BROMIDE AND ALBUTEROL SULFATE 2.5; .5 MG/3ML; MG/3ML
3 SOLUTION RESPIRATORY (INHALATION) EVERY 6 HOURS PRN
Status: DISCONTINUED | OUTPATIENT
Start: 2018-02-24 | End: 2018-02-24

## 2018-02-24 RX ORDER — AMOXICILLIN AND CLAVULANATE POTASSIUM 875; 125 MG/1; MG/1
1 TABLET, FILM COATED ORAL EVERY 12 HOURS
Status: DISCONTINUED | OUTPATIENT
Start: 2018-02-24 | End: 2018-02-25

## 2018-02-24 RX ORDER — BENZONATATE 100 MG/1
100 CAPSULE ORAL 3 TIMES DAILY
Status: DISCONTINUED | OUTPATIENT
Start: 2018-02-24 | End: 2018-02-25

## 2018-02-24 RX ADMIN — IPRATROPIUM BROMIDE AND ALBUTEROL SULFATE 3 ML: .5; 3 SOLUTION RESPIRATORY (INHALATION) at 07:02

## 2018-02-24 RX ADMIN — HYDROXYZINE HYDROCHLORIDE 25 MG: 25 TABLET, FILM COATED ORAL at 11:02

## 2018-02-24 RX ADMIN — MIRTAZAPINE 30 MG: 15 TABLET, FILM COATED ORAL at 08:02

## 2018-02-24 RX ADMIN — AMOXICILLIN AND CLAVULANATE POTASSIUM 1 TABLET: 875; 125 TABLET, FILM COATED ORAL at 12:02

## 2018-02-24 RX ADMIN — TAMSULOSIN HYDROCHLORIDE 0.8 MG: 0.4 CAPSULE ORAL at 08:02

## 2018-02-24 RX ADMIN — ACETAMINOPHEN AND CODEINE PHOSPHATE 1 TABLET: 300; 30 TABLET ORAL at 09:02

## 2018-02-24 RX ADMIN — AMLODIPINE BESYLATE 10 MG: 10 TABLET ORAL at 09:02

## 2018-02-24 RX ADMIN — SULFAMETHOXAZOLE AND TRIMETHOPRIM 1 TABLET: 800; 160 TABLET ORAL at 12:02

## 2018-02-24 RX ADMIN — BENZONATATE 100 MG: 100 CAPSULE ORAL at 08:02

## 2018-02-24 RX ADMIN — FUROSEMIDE 20 MG: 10 INJECTION, SOLUTION INTRAMUSCULAR; INTRAVENOUS at 12:02

## 2018-02-24 RX ADMIN — AMOXICILLIN AND CLAVULANATE POTASSIUM 1 TABLET: 875; 125 TABLET, FILM COATED ORAL at 08:02

## 2018-02-24 RX ADMIN — PROMETHAZINE HYDROCHLORIDE AND CODEINE PHOSPHATE 5 ML: 10; 6.25 SOLUTION ORAL at 12:02

## 2018-02-24 RX ADMIN — ALPRAZOLAM 0.25 MG: 0.25 TABLET ORAL at 08:02

## 2018-02-24 RX ADMIN — FLUTICASONE PROPIONATE 50 MCG: 50 SPRAY, METERED NASAL at 10:02

## 2018-02-24 RX ADMIN — PROMETHAZINE HYDROCHLORIDE AND CODEINE PHOSPHATE 5 ML: 10; 6.25 SOLUTION ORAL at 08:02

## 2018-02-24 RX ADMIN — ACETAMINOPHEN AND CODEINE PHOSPHATE 1 TABLET: 300; 30 TABLET ORAL at 04:02

## 2018-02-24 RX ADMIN — CETIRIZINE HYDROCHLORIDE 10 MG: 10 TABLET, FILM COATED ORAL at 09:02

## 2018-02-24 RX ADMIN — BENZONATATE 100 MG: 100 CAPSULE ORAL at 09:02

## 2018-02-24 RX ADMIN — BENZONATATE 100 MG: 100 CAPSULE ORAL at 02:02

## 2018-02-24 NOTE — CONSULTS
[facility]  Infectious Disease  Consult Note    Patient Name: Rodolfo Messina  MRN: [id number]  Admission Date: 2/21/2018  Hospital Length of Stay: 3 days  Attending Physician: Babak Magallanes MD  Primary Care Provider: Deric Claudio MD     Isolation Status: No active isolations    Patient information was obtained from patient, spouse/SO and past medical records.      Consults  Assessment/Plan:     Asymptomatic bacteriuria        Mr. Messina is a 67 year old man with HTN, urinary retention (indwelling lackey), chronic back pain, essential tremor, anxiety and depression who was admitted with recurrent anemia.  He follows with Urology for urinary retention.  He was seen by them 2/14 as outpatient and catheter was placed and urine culture was sent prior to planned SUDS/cysto.   Culture grew a pansensitive Proteus mirabilis.  He was started on bactrim on 2/19 with plan for 7 days.  On admission for anemia, he was noted to have developed an ANNA and ID is consulted for antibiotic recommendations.     U/A 2/21 negative for pyuria, rare bacteria.  Renal US 2/23 - No hydronephrosis    Patient afebrile, no leukocytosis.  Plan is for cysto as outpatient.  Queried regarding reported ciprofloxacin and cefdinir allergies.  Patient and wife report they do not exactly remember reactions, but believe they were GI/abdominal discomfort.  No rash, facial/tongue/throat swelling. Deny hospitalization for these adverse reactions.  Report inconsistent with true allergic reaction.  Patient believes he has taken penicillins in past without issues.  Discussed switching to antibiotic that is a relative of cefdinir (Augmentin) to complete course of abx and patient and wife amenable to this where he can be observed while inpatient.      -  May use Augmentin 875 mg twice a day to complete course of antibiotics for asymptomatic proteus in urine prior to cystoscopy.     -  Of note, noted Quant Gold to be indeterminate  (patient with pulmonary nodules, followed by Pulmonary.  Recommend T spot.    -  ID will sign off. Please call or re-consult as needed.     Patient seen by, and plan discussed with, ID staff  Discussed with Primary Team             Thank you for your consult. I will sign off. Please contact us if you have any additional questions.    Thank you.   Please call for any questions or concerns.  FREYA Núñez, ANP-C  [phone]    Subjective:     Principal Problem: Symptomatic anemia    HPI:  Mr. Messina is a 67 year old man with HTN, urinary retention (indwelling lackey), chronic back pain, essential tremor, anxiety and depression who was admitted with recurrent anemia.  He follows as an outpatient with Urology for urinary retention.  CT abd/pelvis 1/15/18 showed urinary bladder wall thickening, consistent with cystitis or chronic bladder outlet obstruction.  Kidneys showed mild parenchymal loss and small cysts but no stone, solid mass, or obstruction.  He was seen 2/14 and catheter was placed and urine culture was sent prior to planned SUDS/cysto.   Culture grew a pansensitive Proteus mirabilis.  He was started on bactrim on 2/19 with plan for 7 days.  On admission for anemia, he was noted to have developed an ANNA and ID is consulted for antibiotic recommendations.     U/A 2/21 negative.   Renal US 2/23 - No hydronephrosis    Of note, he has had a chronic cough and has been seen by Pulmonary.  CT of chest shows multiple nodules and micronodular clusters throughout the right lung. Largest focal opacity is within the posterior segment of the right upper lobe measuring 1.0 cm.    Pattern is most consistent with small airway infectious versus noninfectious inflammation; however, underlying malignancy cannot be entirely excluded.  Pulmonary will follow as outpatient.   Quantiferon Gold noted to be indeterminate     Patient denies fevers, chills.  Queried regarding reported ciprofloxacin and cefdinir allergies.  Patient and  wife report they do not exactly remember reactions, but believe they were GI/abdominal discomfort.  No rash, facial/tongue/throat swelling. Deny hospitalization for these.     Past Medical History:   Diagnosis Date    ANNA (acute kidney injury) 2/22/2018    Anemia     Anxiety     BPH (benign prostatic hyperplasia)     Diverticulosis     Hypertension     Urinary retention        Past Surgical History:   Procedure Laterality Date    APPENDECTOMY      COLONOSCOPY      COLONOSCOPY N/A 2/1/2018    Procedure: COLONOSCOPY;  Surgeon: Richar Payan MD;  Location: [address]);  Service: Endoscopy;  Laterality: N/A;  PM prep    EYE SURGERY Right     cataract extraction    FINGER SURGERY      FRACTURE SURGERY Right     index finger    TONSILLECTOMY         Review of patient's allergies indicates:   Allergen Reactions    Ciprofloxacin     Flagyl [metronidazole]     Lisinopril Other (See Comments)     cough    Mysoline [primidone]     Omnicef [cefdinir]     Shellfish containing products        Medications:  Prescriptions Prior to Admission   Medication Sig    ALPRAZolam (XANAX) 0.25 MG tablet Take 1 tablet (0.25 mg total) by mouth nightly as needed for Insomnia.    amLODIPine (NORVASC) 10 MG tablet Take 1 tablet (10 mg total) by mouth once daily.    fluticasone (FLONASE) 50 mcg/actuation nasal spray 1 spray by Each Nare route 2 (two) times daily.    loratadine (CLARITIN) 10 mg tablet Take 10 mg by mouth once daily.    sulfamethoxazole-trimethoprim 800-160mg (BACTRIM DS) 800-160 mg Tab Take 1 tablet by mouth 2 (two) times daily.    tamsulosin (FLOMAX) 0.4 mg Cp24 Take 2 capsules (0.8 mg total) by mouth every evening.    VENTOLIN HFA 90 mcg/actuation inhaler     [DISCONTINUED] ketorolac (TORADOL) 10 mg tablet     brompheniramine-pseudoeph-DM 2-30-10 mg/5 mL Syrp     CHERATUSSIN AC  mg/5 mL syrup Take 10 mLs by mouth every 6 (six) hours as needed.     mirtazapine (REMERON) 30 MG tablet  TAKE 1 TABLET EVERY EVENING    [DISCONTINUED] traMADol (ULTRAM) 50 mg tablet Take 50 mg by mouth every 6 (six) hours as needed for Pain.     Antibiotics     Start     Stop Route Frequency Ordered    02/24/18 1215  amoxicillin-clavulanate 875-125mg per tablet 1 tablet      02/25 2059 Oral Every 12 hours 02/24/18 1108        Antifungals     None        Antivirals     None           Immunization History   Administered Date(s) Administered    Td - PF (ADULT) 08/24/2016       Family History     Problem Relation (Age of Onset)    Heart disease Mother, Father    No Known Problems Sister, Brother    Stroke Mother        Social History     Social History    Marital status:      Spouse name: N/A    Number of children: N/A    Years of education: N/A     Occupational History     [employer]     Social History Main Topics    Smoking status: Never Smoker    Smokeless tobacco: Never Used    Alcohol use Yes      Comment: none recently    Drug use: No    Sexual activity: Not Currently     Partners: Female     Other Topics Concern    None     Social History Narrative    None     Review of Systems   Constitutional: Positive for appetite change and fatigue. Negative for chills, diaphoresis and fever.   HENT: Negative for congestion, dental problem and trouble swallowing.    Respiratory: Positive for cough. Negative for shortness of breath.    Cardiovascular: Negative for chest pain, palpitations and leg swelling.   Gastrointestinal: Positive for constipation. Negative for abdominal pain, blood in stool, diarrhea, nausea and vomiting.   Genitourinary: Positive for difficulty urinating. Negative for decreased urine volume and flank pain.        Lainez    Musculoskeletal: Negative for back pain.   Skin: Negative for rash.   Neurological: Positive for tremors. Negative for dizziness.   Psychiatric/Behavioral: Negative for agitation.     Objective:     Vital Signs (Most Recent):  Temp: 98 °F (36.7 °C) (02/24/18 0812)  Pulse:  (!) 126 (02/24/18 1052)  Resp: (!) 26 (02/24/18 0812)  BP: (!) 157/75 (02/24/18 0812)  SpO2: (!) 90 % (02/24/18 0812) Vital Signs (24h Range):  Temp:  [97.7 °F (36.5 °C)-99.1 °F (37.3 °C)] 98 °F (36.7 °C)  Pulse:  [106-126] 126  Resp:  [18-26] 26  SpO2:  [87 %-95 %] 90 %  BP: (133-157)/(63-75) 157/75     Weight: 74.4 kg (164 lb)  Body mass index is 24.21 kg/m².    Estimated Creatinine Clearance: 44.8 mL/min (A) (based on SCr of 1.6 mg/dL (H)).    Physical Exam   Constitutional: He is oriented to person, place, and time. No distress.   Thin, appears older than stated age   HENT:   Head: Normocephalic and atraumatic.   Eyes: Conjunctivae are normal. No scleral icterus.   Cardiovascular: Normal rate, regular rhythm and normal heart sounds.    Pulmonary/Chest: Effort normal and breath sounds normal. He has no wheezes. He has no rales.   Abdominal: Soft. He exhibits no distension. There is no tenderness.   Genitourinary:   Genitourinary Comments: Lainez to gravity   Musculoskeletal: He exhibits no edema.   Neurological: He is alert and oriented to person, place, and time.   Skin: Skin is warm. He is diaphoretic.       Significant Labs:   Blood Culture: No results for input(s): LABBLOO in the last 4320 hours.  CBC:   Recent Labs  Lab 02/23/18  0722 02/23/18  1714 02/24/18  0423   WBC 6.43 6.26 9.74   HGB 7.9* 7.1* 8.4*   HCT 24.5* 21.8* 25.8*    149* 158     CMP:   Recent Labs  Lab 02/24/18  0423      K 4.8      CO2 19*   *   BUN 22   CREATININE 1.6*   CALCIUM 8.6*   PROT 6.3   ALBUMIN 2.6*   BILITOT 1.5*   ALKPHOS 78   AST 36   ALT 26   ANIONGAP 8   EGFRNONAA 43.9*     Urine Culture:   Recent Labs  Lab 02/14/18  1541   LABURIN PROTEUS MIRABILIS>100,000 cfu/ml     Urine Studies:   Recent Labs  Lab 01/14/18  1123 01/27/18  1359 02/21/18  2132   COLORU Yellow Yellow Straw   APPEARANCEUA Hazy* Clear Clear   PHUR 6.0 5.0 7.0   SPECGRAV 1.010 1.005 1.005   PROTEINUA Negative Negative Negative   GLUCUA  Negative Negative Negative   KETONESU Negative Negative Negative   BILIRUBINUA Negative Negative Negative   OCCULTUA 1+* 3+* 1+*   NITRITE Negative Negative Negative   UROBILINOGEN Negative 2.0 Negative   LEUKOCYTESUR Negative Negative 1+*   RBCUA 8* 4 4   WBCUA 1 1 2   BACTERIA Rare Occasional Rare   SQUAMEPITHEL 3 1  --    HYALINECASTS 1  --   --        Significant Imaging: I have reviewed all pertinent imaging results/findings within the past 24 hours.

## 2018-02-24 NOTE — PLAN OF CARE
Problem: Physical Therapy Goal  Goal: Physical Therapy Goal  Goals to be met by: 10 days (3/6)    Patient will increase functional independence with mobility by performin. Sit to stand transfer with Modified [place]  2. Gait  x 200 feet with Modified McKean using no assistive device  3. Lower extremity exercise program x20 reps per handout, with independence to improve muscular strength and endurance.         Outcome: Ongoing (interventions implemented as appropriate)  PT evaluation completed. Appropriate for PT services.    Haylee Padgett, PT, DPT  2018

## 2018-02-24 NOTE — SUBJECTIVE & OBJECTIVE
Past Medical History:   Diagnosis Date    ANNA (acute kidney injury) 2/22/2018    Anemia     Anxiety     BPH (benign prostatic hyperplasia)     Diverticulosis     Hypertension     Urinary retention        Past Surgical History:   Procedure Laterality Date    APPENDECTOMY      COLONOSCOPY      COLONOSCOPY N/A 2/1/2018    Procedure: COLONOSCOPY;  Surgeon: Richar Payan MD;  Location: [address]);  Service: Endoscopy;  Laterality: N/A;  PM prep    EYE SURGERY Right     cataract extraction    FINGER SURGERY      FRACTURE SURGERY Right     index finger    TONSILLECTOMY         Review of patient's allergies indicates:   Allergen Reactions    Ciprofloxacin     Flagyl [metronidazole]     Lisinopril Other (See Comments)     cough    Mysoline [primidone]     Omnicef [cefdinir]     Shellfish containing products        Medications:  Prescriptions Prior to Admission   Medication Sig    ALPRAZolam (XANAX) 0.25 MG tablet Take 1 tablet (0.25 mg total) by mouth nightly as needed for Insomnia.    amLODIPine (NORVASC) 10 MG tablet Take 1 tablet (10 mg total) by mouth once daily.    fluticasone (FLONASE) 50 mcg/actuation nasal spray 1 spray by Each Nare route 2 (two) times daily.    loratadine (CLARITIN) 10 mg tablet Take 10 mg by mouth once daily.    sulfamethoxazole-trimethoprim 800-160mg (BACTRIM DS) 800-160 mg Tab Take 1 tablet by mouth 2 (two) times daily.    tamsulosin (FLOMAX) 0.4 mg Cp24 Take 2 capsules (0.8 mg total) by mouth every evening.    VENTOLIN HFA 90 mcg/actuation inhaler     [DISCONTINUED] ketorolac (TORADOL) 10 mg tablet     brompheniramine-pseudoeph-DM 2-30-10 mg/5 mL Syrp     CHERATUSSIN AC  mg/5 mL syrup Take 10 mLs by mouth every 6 (six) hours as needed.     mirtazapine (REMERON) 30 MG tablet TAKE 1 TABLET EVERY EVENING    [DISCONTINUED] traMADol (ULTRAM) 50 mg tablet Take 50 mg by mouth every 6 (six) hours as needed for Pain.     Antibiotics     Start     Stop  Route Frequency Ordered    02/24/18 1215  amoxicillin-clavulanate 875-125mg per tablet 1 tablet      02/25 2059 Oral Every 12 hours 02/24/18 1108        Antifungals     None        Antivirals     None           Immunization History   Administered Date(s) Administered    Td - PF (ADULT) 08/24/2016       Family History     Problem Relation (Age of Onset)    Heart disease Mother, Father    No Known Problems Sister, Brother    Stroke Mother        Social History     Social History    Marital status:      Spouse name: N/A    Number of children: N/A    Years of education: N/A     Occupational History     [employer]     Social History Main Topics    Smoking status: Never Smoker    Smokeless tobacco: Never Used    Alcohol use Yes      Comment: none recently    Drug use: No    Sexual activity: Not Currently     Partners: Female     Other Topics Concern    None     Social History Narrative    None     Review of Systems   Constitutional: Positive for appetite change and fatigue. Negative for chills, diaphoresis and fever.   HENT: Negative for congestion, dental problem and trouble swallowing.    Respiratory: Positive for cough. Negative for shortness of breath.    Cardiovascular: Negative for chest pain, palpitations and leg swelling.   Gastrointestinal: Positive for constipation. Negative for abdominal pain, blood in stool, diarrhea, nausea and vomiting.   Genitourinary: Positive for difficulty urinating. Negative for decreased urine volume and flank pain.        Lainez    Musculoskeletal: Negative for back pain.   Skin: Negative for rash.   Neurological: Positive for tremors. Negative for dizziness.   Psychiatric/Behavioral: Negative for agitation.     Objective:     Vital Signs (Most Recent):  Temp: 98 °F (36.7 °C) (02/24/18 0812)  Pulse: (!) 126 (02/24/18 1052)  Resp: (!) 26 (02/24/18 0812)  BP: (!) 157/75 (02/24/18 0812)  SpO2: (!) 90 % (02/24/18 0812) Vital Signs (24h Range):  Temp:  [97.7 °F (36.5  °C)-99.1 °F (37.3 °C)] 98 °F (36.7 °C)  Pulse:  [106-126] 126  Resp:  [18-26] 26  SpO2:  [87 %-95 %] 90 %  BP: (133-157)/(63-75) 157/75     Weight: 74.4 kg (164 lb)  Body mass index is 24.21 kg/m².    Estimated Creatinine Clearance: 44.8 mL/min (A) (based on SCr of 1.6 mg/dL (H)).    Physical Exam   Constitutional: He is oriented to person, place, and time. No distress.   Thin, appears older than stated age   HENT:   Head: Normocephalic and atraumatic.   Eyes: Conjunctivae are normal. No scleral icterus.   Cardiovascular: Normal rate, regular rhythm and normal heart sounds.    Pulmonary/Chest: Effort normal and breath sounds normal. He has no wheezes. He has no rales.   Abdominal: Soft. He exhibits no distension. There is no tenderness.   Genitourinary:   Genitourinary Comments: Lainez to gravity   Musculoskeletal: He exhibits no edema.   Neurological: He is alert and oriented to person, place, and time.   Skin: Skin is warm. He is diaphoretic.       Significant Labs:   Blood Culture: No results for input(s): LABBLOO in the last 4320 hours.  CBC:   Recent Labs  Lab 02/23/18  0722 02/23/18  1714 02/24/18  0423   WBC 6.43 6.26 9.74   HGB 7.9* 7.1* 8.4*   HCT 24.5* 21.8* 25.8*    149* 158     CMP:   Recent Labs  Lab 02/24/18  0423      K 4.8      CO2 19*   *   BUN 22   CREATININE 1.6*   CALCIUM 8.6*   PROT 6.3   ALBUMIN 2.6*   BILITOT 1.5*   ALKPHOS 78   AST 36   ALT 26   ANIONGAP 8   EGFRNONAA 43.9*     Urine Culture:   Recent Labs  Lab 02/14/18  1541   LABURIN PROTEUS MIRABILIS>100,000 cfu/ml     Urine Studies:   Recent Labs  Lab 01/14/18  1123 01/27/18  1359 02/21/18  2132   COLORU Yellow Yellow Straw   APPEARANCEUA Hazy* Clear Clear   PHUR 6.0 5.0 7.0   SPECGRAV 1.010 1.005 1.005   PROTEINUA Negative Negative Negative   GLUCUA Negative Negative Negative   KETONESU Negative Negative Negative   BILIRUBINUA Negative Negative Negative   OCCULTUA 1+* 3+* 1+*   NITRITE Negative Negative Negative    UROBILINOGEN Negative 2.0 Negative   LEUKOCYTESUR Negative Negative 1+*   RBCUA 8* 4 4   WBCUA 1 1 2   BACTERIA Rare Occasional Rare   SQUAMEPITHEL 3 1  --    HYALINECASTS 1  --   --        Significant Imaging: I have reviewed all pertinent imaging results/findings within the past 24 hours.

## 2018-02-24 NOTE — ASSESSMENT & PLAN NOTE
Mr. Messina is a 67 year old man with HTN, urinary retention (indwelling lackey), chronic back pain, essential tremor, anxiety and depression who was admitted with recurrent anemia.  He follows with Urology for urinary retention.  He was seen by them 2/14 as outpatient and catheter was placed and urine culture was sent prior to planned SUDS/cysto.   Culture grew a pansensitive Proteus mirabilis.  He was started on bactrim on 2/19 with plan for 7 days.  On admission for anemia, he was noted to have developed an ANNA and ID is consulted for antibiotic recommendations.     U/A 2/21 negative for pyuria, rare bacteria.  Renal US 2/23 - No hydronephrosis    Patient afebrile, no leukocytosis.  Plan is for cysto as outpatient.  Queried regarding reported ciprofloxacin and cefdinir allergies.  Patient and wife report they do not exactly remember reactions, but believe they were GI/abdominal discomfort.  No rash, facial/tongue/throat swelling. Deny hospitalization for these adverse reactions.  Report inconsistent with true allergic reaction.  Patient believes he has taken penicillins in past without issues.  Discussed switching to antibiotic that is a relative of cefdinir (Augmentin) to complete course of abx and patient and wife amenable to this where he can be observed while inpatient.      -  May use Augmentin 875 mg twice a day to complete course of antibiotics for asymptomatic proteus in urine prior to cystoscopy.     -  Of note, noted Quant Gold to be indeterminate (patient with pulmonary nodules, followed by Pulmonary.  Recommend T spot.    -  ID will sign off. Please call or re-consult as needed.     Patient seen by, and plan discussed with, ID staff  Discussed with Primary Team

## 2018-02-24 NOTE — CONSULTS
[facility]  Adult Nutrition  Consult Note    SUMMARY     Recommendations    Recommendation/Intervention: cont on cardiac diet. Add boost Plus. ENcourage po intake > 75%. RD To follow  Goals: consume > 85% EEN/EPN  Nutrition Goal Status: new  Communication of RD Recs: reviewed with RN    Continuum of Care Plan         Reason for Assessment    Reason for Assessment: physician consult  Diagnosis:  (anemia)  Relevent Medical History: .medhx   Interdisciplinary Rounds: did not attend     General Information Comments: Pt states about 10lbs wt loss over the last mo.  decreased appetite, no c/o diverticuosis associated problems         Nutrition Prescription Ordered    Current Diet Order: CArdiac                    Evaluation of Received Nutrients/Fluid Intake                                           Energy Calories Required: not meeting needs                 Protein Required: not meeting needs        IV Fluid (mL):  (prn)           I/O: -5.8L since admit         Fluid Required: not meeting needs  Comments: LBM:2/23/18     % Intake of Estimated Energy Needs: 50 - 75 %  % Meal Intake: 75%     Nutrition Risk Screen     Nutrition Risk Screen: no indicators present    Nutrition/Diet History    Patient Reported Diet/Restrictions/Preferences: general  Typical Food/Fluid Intake: adequate PTA  Food Preferences: none noted        Factors Affecting Nutritional Intake: decreased appetite                Labs/Tests/Procedures/Meds    Diagnostic Test/Procedure Review: reviewed  Pertinent Labs Reviewed: reviewed  Pertinent Labs Comments:   BMP  Lab Results   Component Value Date     02/24/2018    K 4.8 02/24/2018     02/24/2018    CO2 19 (L) 02/24/2018    BUN 22 02/24/2018    CREATININE 1.6 (H) 02/24/2018    CALCIUM 8.6 (L) 02/24/2018    ANIONGAP 8 02/24/2018    ESTGFRAFRICA 50.8 (A) 02/24/2018    EGFRNONAA 43.9 (A) 02/24/2018       Pertinent Medications Reviewed: reviewed  Pertinent Medications  "Comments:    acetaminophen-codeine 300-30mg  1 tablet Oral Q8H    albuterol-ipratropium 2.5mg-0.5mg/3mL  3 mL Nebulization Q4H WAKE    amLODIPine  10 mg Oral Daily    benzonatate  100 mg Oral TID    cetirizine  10 mg Oral Daily    fluticasone  1 spray Each Nare BID    mirtazapine  30 mg Oral QHS    senna-docusate 8.6-50 mg  2 tablet Oral Daily    tamsulosin  0.8 mg Oral QHS         Physical Findings    Overall Physical Appearance: nourished, weak     Oral/Mouth Cavity: WDL  Skin: intact    Anthropometrics    Temp: 98 °F (36.7 °C)     Height: 5' 9.02" (175.3 cm)  Weight Method: Stated  Weight: 74.4 kg (164 lb)  Ideal Body Weight (IBW), Male: 160.12 lb     % Ideal Body Weight, Male (lb): 102.42 lb     BMI (Calculated): 24.3  BMI Grade: 18.5-24.9 - normal  Weight Loss: unintentional  Usual Body Weight (UBW), k kg  Weight Change Amount: 10 lb 2.3 oz  % Usual Body Weight: 94.36  % Weight Change From Usual Weight: -5.84 %             Estimated/Assessed Needs    Weight Used For Calorie Calculations: 74.4 kg (164 lb 0.4 oz)      Energy Calorie Requirements (kcal):   Energy Need Method: Westley-St Jeor (x 1.3)        RMR (Westley-St. Jeor Equation): 1509.63        Weight Used For Protein Calculations: 74.4 kg (164 lb 0.4 oz)  Protein Requirements: 75-89 (1-1.2g/kg)    Fluid Requirements (mL): per MD  Fluid Need Method: RDA Method        RDA Method (mL):          CHO Requirement: NA     Assessment and Plan    Mild malnutrition    Malnutrition in the context of Acute Illness/Injury    Related to (etiology):  ANNA    Signs and Symptoms (as evidenced by):  Energy Intake: <75% of estimated energy requirement for 1mo  Body Fat Depletion: mild depletion of triceps and thoracic and lumbar region   Muscle Mass Depletion: mild depletion of scapular region, interosseous muscle and lower extremities   Weight Loss: 10% x 1mo   Fluid Accumulation: mild  Reduced  Strength: none    Interventions/Recommendations " (treatment strategy):  See recs above    Nutrition Diagnosis Status:  New              Monitor and Evaluation    Food and Nutrient Intake: energy intake, food and beverage intake  Food and Nutrient Adminstration: diet order     Physical Activity and Function: nutrition-related ADLs and IADLs  Anthropometric Measurements: weight, weight change  Biochemical Data, Medical Tests and Procedures:  (All labs)  Nutrition-Focused Physical Findings: overall appearance    Nutrition Risk    Level of Risk:  (f/u 1x/wk)    Nutrition Follow-Up    RD Follow-up?: Yes

## 2018-02-24 NOTE — NURSING
Pt remains aaox4. O2 saturation 87% on RA, pt reports feeling son and sore in his chest from the constant cough. O2 at 2LPM NC initiated. Notified Francesco on call with [facility]. Will continue to assess.

## 2018-02-24 NOTE — PLAN OF CARE
Problem: Patient Care Overview  Goal: Plan of Care Review   02/24/18 0904   Coping/Psychosocial   Plan Of Care Reviewed With patient   Recommendations     Recommendation/Intervention: cont on cardiac diet. Add boost Plus. ENcourage po intake > 75%. RD To follow  Goals: consume > 85% EEN/EPN  Nutrition Goal Status: new  Communication of RD Recs: reviewed with RN  Assessment and Plan         Mild malnutrition     Malnutrition in the context of Acute Illness/Injury     Related to (etiology):  ANNA     Signs and Symptoms (as evidenced by):  Energy Intake: <75% of estimated energy requirement for 1mo  Body Fat Depletion: mild depletion of triceps and thoracic and lumbar region   Muscle Mass Depletion: mild depletion of scapular region, interosseous muscle and lower extremities   Weight Loss: 10% x 1mo   Fluid Accumulation: mild  Reduced  Strength: none     Interventions/Recommendations (treatment strategy):  See recs above     Nutrition Diagnosis Status:  New

## 2018-02-24 NOTE — ASSESSMENT & PLAN NOTE
Malnutrition in the context of Acute Illness/Injury    Related to (etiology):  ANNA    Signs and Symptoms (as evidenced by):  Energy Intake: <75% of estimated energy requirement for 1mo  Body Fat Depletion: mild depletion of triceps and thoracic and lumbar region   Muscle Mass Depletion: mild depletion of scapular region, interosseous muscle and lower extremities   Weight Loss: 10% x 1mo   Fluid Accumulation: mild  Reduced  Strength: none    Interventions/Recommendations (treatment strategy):  See recs above    Nutrition Diagnosis Status:  New

## 2018-02-24 NOTE — CONSULTS
[facility]  Infectious Disease  Consult Note    Patient Name: Rodolfo Messina  MRN: [id number]  Admission Date: 2/21/2018  Hospital Length of Stay: 3 days  Attending Physician: Babak Bran MD  Primary Care Provider: Deric Claudio MD     Isolation Status: No active isolations        Inpatient consult to Infectious Diseases  Consult performed by: LES PIERRE  Consult ordered by: BABAK BRAN  Reason for consult: proteus UTI, dion on Bactrim      ID Consult acknowledged.   Full consult and recommendations to follow today  In the interim, please call for any immediate concerns.     Thank you.   FREYA Núñez, ANP-C  [phone] pager,  [phone]

## 2018-02-24 NOTE — NURSING
O2 saturation 93% on 2 liters NC. Vital signs stable, no s/s of distress. RBC infusing at 125/hr, tolerated well, will continue to assess.

## 2018-02-24 NOTE — NURSING
Pt remains aaox4, RBC complete. Vital signs stable. No complaints of SOB, pain or discomfort, kaylin continue to assess.

## 2018-02-24 NOTE — PT/OT/SLP EVAL
Physical Therapy Evaluation    Patient Name:  Rodolfo Messina   MRN:  [id number]    Recommendations:     Discharge Recommendations:  home   Discharge Equipment Recommendations: none   Barriers to discharge: None    Assessment:     Rodolfo Messina is a 67 y.o. male admitted with a medical diagnosis of Symptomatic anemia.  He presents with the following impairments/functional limitations:  impaired endurance, impaired cardiopulmonary response to activity. During today's evaluation, pt able to ambulate with minimal instability denoted. Pt with tremors of (B) UE exacerbated with incr movement. PTA, pt (I) with mobility and self care; wife at bedside able to assist with mobility. Pt appropriate for additional PT visit to ensure progression and safety with mobility while in hospital to assist c/ discharge back to home environment.     Rehab Prognosis:  Good; patient would benefit from acute skilled PT services to address these deficits and reach maximum level of function.      Recent Surgery: * No surgery found *      Plan:     During this hospitalization, patient to be seen 2 x/week to address the above listed problems via gait training, therapeutic activities, therapeutic exercises  · Plan of Care Expires:  03/26/18   Plan of Care Reviewed with: patient    Subjective     Communicated with nsg prior to session.  Patient found supine in bed c/ wife at bedside upon PT entry to room, agreeable to evaluation.      Chief Complaint: shortness of breath/need for oxygen  Patient comments/goals: to walk in hallway  Pain/Comfort:  · Pain Rating 1: 0/10    Patients cultural, spiritual, Temple conflicts given the current situation:      Living Environment:  Patient History:  · Home environment: lives with wife in one story home c/ threshold to enter  · Assistance provided:  Wife; 24/7  · Bathroom set up:  Tub/shower    Previous Level of Mobilty  · Transfers: (I)  · Gait: (I)    Additional Roles and Hx of Patient  · Working:  no  · Driving: yes  · Hobbies:driving camper  · Hearing or Vision Deficit: none  · Hx of Falls: none    DME: none  - Bold implies equipment being used    Objective:     Patient found with: oxygen (3L O2)     General Precautions: Standard, fall   Orthopedic Precautions:N/A   Braces: N/A       Exams:  Cognitive Exam  Patient is oriented to Person, Place, Time and Situation and follows 100% of multi-step commands    Fine Motor Coordination    -       Intact   Postural Exam Patient presented with the following abnormalities:    -       Rounded shoulders  -       Kyphosis   Sensation    -       Intact   Skin Integrity/Edema     -       Skin integrity: Visible skin intact, Thin and Dry  -       Edema: None noted in (B) LE   R LE ROM WFL   R LE Strength  WFL   L LE ROM WFL   L LE Strength  WFL       Functional Mobility  Bed Mobility  Scooting: stand by assistance  Supine to Sit: stand by assistance   Sit to Supine: stand by assistance   Transfers Sit to Stand:  stand by assistance and contact guard assistance with no AD for 2 trials     Gait Gait Distance: 20 ft in room, 150 ft in hallway  Assistance Level: stand by assistance and contact guard assistance  Description: incr forward trunk lean and slight wide base of support while ambulating with minimal reciprocal arm swing. Req 3 minutes standing rest break before returning to room; ambulated on 3L O2         Balance   Static Sitting supervision   Dynamic Sitting supervision   Static Standing stand by assistance   Dynamic Standing contact guard assistance       AM-PAC 6 CLICK MOBILITY  Total Score:19       Therapeutic Activities and Exercises:    PT educated pt on the following  - role of PT  - PT POC (including frequency and duration while in hospital)  - discharge recommendation (home) and equipment needs (none)  - level of assistance currently req (1 person for safety and supervision) and safety precautions with [facility] staff   All questions and concerns answered and  addressed. White board updated with pertinent information. [facility] notified.   * educated pt on being safe to ambulate in hallway with oxygen provided from [facility] staff until potentially weaned     Patient left seated edge of bed with all lines intact, call button in reach and wife and MD present.    GOALS:    Physical Therapy Goals        Problem: Physical Therapy Goal    Goal Priority Disciplines Outcome Goal Variances Interventions   Physical Therapy Goal     PT/OT, PT Ongoing (interventions implemented as appropriate)     Description:  Goals to be met by: 10 days (3/6)    Patient will increase functional independence with mobility by performin. Sit to stand transfer with Modified [place]  2. Gait  x 200 feet with Modified [place] using no assistive device  3. Lower extremity exercise program x20 reps per handout, with independence to improve muscular strength and endurance.                           History:     Past Medical History:   Diagnosis Date    ANNA (acute kidney injury) 2018    Anemia     Anxiety     BPH (benign prostatic hyperplasia)     Diverticulosis     Hypertension     Urinary retention        Past Surgical History:   Procedure Laterality Date    APPENDECTOMY      COLONOSCOPY      COLONOSCOPY N/A 2018    Procedure: COLONOSCOPY;  Surgeon: Richar Payan MD;  Location: [address];  Service: Endoscopy;  Laterality: N/A;  PM prep    EYE SURGERY Right     cataract extraction    FINGER SURGERY      FRACTURE SURGERY Right     index finger    TONSILLECTOMY         Clinical Decision Making:     History  Co-morbidities and personal factors that may impact the plan of care Examination  Body Structures and Functions, activity limitations and participation restrictions that may impact the plan of care Clinical Presentation   Decision Making/ Complexity Score   Co-morbidities:   [] Time since onset of injury / illness / exacerbation  [x] Status of current  condition  []Patient's cognitive status and safety concerns    [x] Multiple Medical Problems (see med hx)  Personal Factors:   [] Patient's age  [] Prior Level of function   [] Patient's home situation (environment and family support)  [] Patient's level of motivation  [] Expected progression of patient      HISTORY:(criteria)    [] 57370 - no personal factors/history    [x] 41931 - has 1-2 personal factor/comorbidity     [] 08983 - has >3 personal factor/comorbidity     Body Regions:  [] Objective examination findings  [] Head     []  Neck  [] Trunk   [] Upper Extremity  [x] Lower Extremity    Body Systems:  [] For communication ability, affect, cognition, language, and learning style: the assessment of the ability to make needs known, consciousness, orientation (person, place, and time), expected emotional /behavioral responses, and learning preferences (eg, learning barriers, education  needs)  [] For the neuromuscular system: a general assessment of gross coordinated movement (eg, balance, gait, locomotion, transfers, and transitions) and motor function  (motor control and motor learning)  [] For the musculoskeletal system: the assessment of gross symmetry, gross range of motion, gross strength, height, and weight  [] For the integumentary system: the assessment of pliability(texture), presence of scar formation, skin color, and skin integrity  [x] For cardiovascular/pulmonary system: the assessment of heart rate, respiratory rate, blood pressure, and edema     Activity limitations:    [] Patient's cognitive status and saf ety concerns          [] Status of current condition      [] Weight bearing restriction  [] Cardiopulmunary Restriction    Participation Restrictions:   [] Goals and goal agreement with the patient     [] Rehab potential (prognosis) and probable outcome      Examination of Body System: (criteria)    [x] 58707 - addressing 1-2 elements    [] 14446 - addressing a total of 3 or more elements      [] 03367 -  Addressing a total of 4 or more elements         Clinical Presentation: (criteria)  Stable - 98834     On examination of body system using standardized tests and measures patient presents with 3 or more elements from any of the following: body structures and functions, activity limitations, and/or participation restrictions.  Leading to a clinical presentation that is considered stable and/or uncomplicated                              Clinical Decision Making  (Eval Complexity):  Low- 12617     Time Tracking:     PT Received On: 02/24/18  PT Start Time: 1055     PT Stop Time: 1120  PT Total Time (min): 25 min     Billable Minutes: Evaluation 15 and Therapeutic Activity 10      Haylee Padgett, PT , DPT  02/24/2018

## 2018-02-24 NOTE — HPI
Mr. Messina is a 67 year old man with HTN, urinary retention (indwelling lackey), chronic back pain, essential tremor, anxiety and depression who was initially admitted with recurrent anemia (Hgb 6.7 on admit). Evaluated by GI who did not think patient was having an upper or lower GI bleed. He was also evaluated by hem-onc who recommended any outpatient bone marrow biopsy. He received 2 units of pRBC while admitted. Patient states that he has had a chronic cough for the 2 months and occasional fevers for which he received steroids and antibiotics. CTA chest on admit shows multiple nodules and micronodular clusters throughout the right lung, consistent with small airway infectious versus noninfectious inflammation but not excluding malignancy (patient has history of occupational asbestos exposure and smoking). Quantiferon gold was indeterminate. He was evaluated by pulmonary who recommend duo-nebs and outpatient follow-up.  He was also seen by ID for recent UTI who recommend augmentin x 7 days. On 2/24, patient developed acute respiratory failure required up to 30 L O2 and was transferred to ICU. CXR showed  interval development of bilateral patchy interstitial and parietal attenuation reflective of edema vs infection. He was started on vanc, unasyn, and azithromycin. 2D echo revealed evidence of aortic valve vegetation. SOWMYA performed on 2/27 showed vegetation on aortic and mitral valve associated with severe aortic and mitral regurg. Blood culture from 2/26 grew gram pos cocci in 1 of 4 bottles.     Patient denies any recent dental work. He did receive blood transfusions within the last month related to anemia. No recent travel. Patient does have cats that have scratched him in the past.

## 2018-02-24 NOTE — PROGRESS NOTES
Progress Note  Hospital Medicine    Admit Date: 2/21/2018  Length of Stay:  LOS: 3 days     SUBJECTIVE:         Follow-up For:  Symptomatic anemia    HPI/Interval history (See H&P for complete P,F,SHx) :     2/22/18  s/p unit of PRBC today. Hb 6.7--> 7.3. discussed with GI.Negative FOBT in ER. No melena. Recommended supportive care with blood transfusions. capsule study as outpatient. CT chest -Multiple nodules and micronodular clusters seen throughout the right lung. Largest focal opacity is within the posterior segment of the right upper lobe measuring 1.0 cm.infectious versus noninfectious inflammation; however, underlying malignancy cannot be entirely excluded. Pulmonology consulted     02/23/18  ANNA on bactrim, bactrim discontinued ,  allergic to cipro and cefdinir. Renal sonogram to rule out hydronephrosis . ID consulted. 1 unit of  PRBC transfusion for Hb of 7.1    2/24/18  hypoxic overnight, started on oxygen via nasal canula. BNP at 300. IV fluids discontinued. CX ray -lungs are symmetrically expanded bilaterally patchy increased interstitial and parietal attenuation bilaterally, may reflect edema or infection, and echocardiogram . Duonebs changed to PRN for sinus tachycardia       Review of Systems: List if applicable  Pain scale: 0/10  Constitutional- Positive for  Weakness, wt loss, intermittent night sweats  Resp- Positive for dry Cough, SOB  GI- Positive for Nausea, poor oral intake     OBJECTIVE:     Vital Signs Range (Last 24H):  Temp:  [97.7 °F (36.5 °C)-99.1 °F (37.3 °C)]   Pulse:  [106-126]   Resp:  [18-26]   BP: (133-157)/(63-75)   SpO2:  [87 %-95 %]     Physical Exam:  General- Patient alert and oriented x3   HEENT- PERRLA, EOMI, OP clear  Neck- No JVD, Lymphadenopathy, Thyromegaly  CV- Regular rate and rhythm, No Murmur/francia/rubs  Resp- Lungs CTA Bilaterally, No increased WOB  Abdomen- Non tender/non-distended, BS normoactive x4 quads, no HSM  Extrem- No cyanosis, clubbing, edema.   Skin- No  rashes, lesions, ulcers  Neuro- able to move upper and lower extremities without limitation      Medications:  Medication list was reviewed and changes noted under Assessment/Plan.      Current Facility-Administered Medications:     0.9%  NaCl infusion (for blood administration), , Intravenous, Q24H PRN, Terry Gómez MD    0.9%  NaCl infusion (for blood administration), , Intravenous, Q24H PRN, Babak Magallanes MD    acetaminophen tablet 650 mg, 650 mg, Oral, Q6H PRN, Lincoln Dave, DO, 650 mg at 02/23/18 2156    acetaminophen-codeine 300-30mg per tablet 1 tablet, 1 tablet, Oral, Q8H, Babak Magallanes MD, 1 tablet at 02/24/18 0957    albuterol-ipratropium 2.5mg-0.5mg/3mL nebulizer solution 3 mL, 3 mL, Nebulization, Q4H PRN, Terry Castellanos PA-C    ALPRAZolam tablet 0.25 mg, 0.25 mg, Oral, Nightly PRN, Lincoln Dave, DO, 0.25 mg at 02/23/18 2156    amLODIPine tablet 10 mg, 10 mg, Oral, Daily, Lincoln Dave DO, 10 mg at 02/24/18 0957    amoxicillin-clavulanate 875-125mg per tablet 1 tablet, 1 tablet, Oral, Q12H, Babak Magallanes MD, 1 tablet at 02/24/18 1203    benzonatate capsule 100 mg, 100 mg, Oral, TID, Babak Magallanes MD, 100 mg at 02/24/18 0957    cetirizine tablet 10 mg, 10 mg, Oral, Daily, Lincoln Dave DO, 10 mg at 02/24/18 0957    dextrose 50% injection 12.5 g, 12.5 g, Intravenous, PRN, Terry Gómez MD    dextrose 50% injection 12.5 g, 12.5 g, Intravenous, PRN, Lincoln Dave, DO    dextrose 50% injection 25 g, 25 g, Intravenous, PRN, Terry Gómez MD    dextrose 50% injection 25 g, 25 g, Intravenous, PRN, Lincoln Dave DO    EPINEPHrine (EPIPEN) 0.3 mg/0.3 mL pen injection 0.3 mg, 0.3 mg, Intramuscular, PRN, Babak Magallanes MD    fluticasone 50 mcg/actuation nasal spray 50 mcg, 1 spray, Each Nare, BID, Lincoln Dave DO, 50 mcg at 02/24/18 1000    glucagon (human recombinant) injection 1 mg, 1 mg, Intramuscular, PRN, Terry Gómez MD    glucagon (human  recombinant) injection 1 mg, 1 mg, Intramuscular, PRN, Arup K. Jolei, DO    glucose chewable tablet 16 g, 16 g, Oral, PRN, Terry Gómez MD    glucose chewable tablet 16 g, 16 g, Oral, PRN, Arup K. Jolie, DO    glucose chewable tablet 24 g, 24 g, Oral, PRN, Terry Gómez MD    glucose chewable tablet 24 g, 24 g, Oral, PRN, Arup K. Jolie, DO    mirtazapine tablet 30 mg, 30 mg, Oral, QHS, Arup K. Jolie, DO, 30 mg at 02/23/18 2156    ondansetron injection 4 mg, 4 mg, Intravenous, Q6H PRN, Arup K. Jolie, DO    promethazine-codeine 6.25-10 mg/5 ml syrup 5 mL, 5 mL, Oral, Q4H PRN, Arup K. Jolie, DO, 5 mL at 02/24/18 0811    senna-docusate 8.6-50 mg per tablet 2 tablet, 2 tablet, Oral, Daily, Babak Magallanes MD, 2 tablet at 02/23/18 1124    sodium chloride 0.9% flush 5 mL, 5 mL, Intravenous, PRN, Terry Gómez MD    sodium chloride 0.9% flush 5 mL, 5 mL, Intravenous, PRN, Arup K. Jolie, DO    tamsulosin 24 hr capsule 0.8 mg, 0.8 mg, Oral, QHS, Arup K. Jolie, DO, 0.8 mg at 02/23/18 2156    traMADol tablet 50 mg, 50 mg, Oral, Q6H PRN, Arup K. Jolie, DO    sodium chloride, sodium chloride, acetaminophen, albuterol-ipratropium 2.5mg-0.5mg/3mL, ALPRAZolam, dextrose 50%, dextrose 50%, dextrose 50%, dextrose 50%, EPINEPHrine, glucagon (human recombinant), glucagon (human recombinant), glucose, glucose, glucose, glucose, ondansetron, promethazine-codeine 6.25-10 mg/5 ml, sodium chloride 0.9%, sodium chloride 0.9%, traMADol    Laboratory/Diagnostic Data:  Reviewed and noted in plan where applicable- Please see chart for full lab data.      Recent Labs  Lab 02/23/18  0722 02/23/18  1714 02/24/18  0423   WBC 6.43 6.26 9.74   HGB 7.9* 7.1* 8.4*   HCT 24.5* 21.8* 25.8*    149* 158         Recent Labs  Lab 02/21/18  2132 02/22/18  0612 02/24/18  0423   * 135* 137   K 4.8 4.6 4.8    104 110   CO2 20* 22* 19*   BUN 33* 27* 22   CREATININE 2.0* 1.9* 1.6*    127* 113*   CALCIUM 8.7 8.7 8.6*  "  MG  --  2.2  --    PHOS  --  3.7  --          Recent Labs  Lab 02/21/18  2132 02/22/18  0612 02/24/18  0423   ALKPHOS 82 76 78   ALT 15 13 26   AST 21 21 36   ALBUMIN 2.8* 2.6* 2.6*   PROT 6.9 6.4 6.3   BILITOT 0.4 1.3* 1.5*   INR 1.1  --   --         Microbiology labs for the last week  Microbiology Results (last 7 days)     ** No results found for the last 168 hours. **           Imaging Results          X-Ray Chest PA And Lateral (Final result)  Result time 02/21/18 21:59:42    Final result by Pradeep Peralta MD (02/21/18 21:59:42)                 Impression:     No acute cardiopulmonary abnormality.      Electronically signed by: Pradeep Peralta  Date:     02/21/18  Time:    21:59              Narrative:    EXAM:  2 VIEW CHEST RADIOGRAPH.     CLINICAL INDICATION:  Cough.    COMPARISON: 01/27/2018.    TECHNIQUE:  Two views of the chest were obtained.     FINDINGS: Cardiac silhouette is not significantly enlarged and stable in size.  Minimal linear subsegmental atelectasis at the lung bases. No focal consolidation.  No pleural effusion or pneumothorax.  No acute bony abnormality is identified.                              Estimated body mass index is 24.21 kg/m² as calculated from the following:    Height as of this encounter: 5' 9.02" (1.753 m).    Weight as of this encounter: 74.4 kg (164 lb).    I & O (Last 24H):    Intake/Output Summary (Last 24 hours) at 02/24/18 1309  Last data filed at 02/24/18 0900   Gross per 24 hour   Intake           490.42 ml   Output             1850 ml   Net         -1359.58 ml       Estimated Creatinine Clearance: 44.8 mL/min (A) (based on SCr of 1.6 mg/dL (H)).    ASSESSMENT/PLAN:     Active Problems:    Active Hospital Problems    Diagnosis  POA    *Symptomatic anemia [D64.9]EGD and colonoscopy recently on 02/01/18 showed erosive gastropathy w/o bleeding.   S/p unit of PRBC today. Hb 6.7--> 7.3.-->7.1--> 8.4 s/p 1  unnit of PRBC  discussed with GI.Negative FOBT in ER. No " melena. Recommended supportive care with blood transfusions. capsule study as outpatient.  Yes    ANNA (acute kidney injury) [N17.9]Cr 1.2 --> 1.9. esaley prerrena vs ATN. no improvement with  IV hydration. ANNA on bactrim, bactrim discontinued ,  allergic to cipro and cefdinir. Renal sonogram to rule out hydronephrosis . ID consulted . received  Augmentin with no repsonse     Acute hypoxic resp failure - hypoxic spo2 87% overnight, started on oxygen via nasal canula. BNP at 300. IV fluids discontinued. CX ray -lungs are symmetrically expanded bilaterally patchy increased interstitial and parietal attenuation bilaterally, may reflect edema or infection, echocardiogram . Duonebs changed to PRN for sinus tachycardia . Lasix 20mg IV x once      Yes    Chronic cough [R05]dry .CT chest -Multiple nodules and micronodular clusters seen throughout the right lung. Largest focal opacity is within the posterior segment of the right upper lobe measuring 1.0 cm.infectious versus noninfectious inflammation; however, underlying malignancy cannot be entirely excluded. Pulmonology consulted . history of exposure to asbestos    Indwelling lackey for Urinary retention -currently on 2/ 7 days course of bactrim for UTI . urine C/S from 2/14 - proteus .urodynamic study canceled and scheduled as outpatient      Essential tremor -  on remeron . follows up with neurology    Malnutrition - prealbumin of 12. Dietary consulted     Weight loss, night sweats - 10lb .CT abdomen/pelvis last month negative for malignancy .quantiferon gold for further evaluation   Yes    Anxiety [F41.9]xanax prn   Yes    HTN (hypertension) [I10]- controlled on amlodipine   Yes     Chronic      Resolved Hospital Problems    Diagnosis Date Resolved POA   No resolved problems to display.         Disposition- Home    DVT prophylaxis addressed with: B/L SCD            Babak Magallanes MD  Attending Staff Physician  Hospital Medicine  pager- [phone] [id number] - [phone]

## 2018-02-25 LAB
ALBUMIN SERPL BCP-MCNC: 2.4 G/DL
ALBUMIN SERPL BCP-MCNC: 2.5 G/DL
ALBUMIN SERPL BCP-MCNC: 2.6 G/DL
ALLENS TEST: ABNORMAL
ALLENS TEST: ABNORMAL
ALP SERPL-CCNC: 79 U/L
ALT SERPL W/O P-5'-P-CCNC: 35 U/L
ANION GAP SERPL CALC-SCNC: 10 MMOL/L
ANION GAP SERPL CALC-SCNC: 8 MMOL/L
ANION GAP SERPL CALC-SCNC: 9 MMOL/L
AST SERPL-CCNC: 45 U/L
BACTERIA SPEC AEROBE CULT: NORMAL
BASOPHILS # BLD AUTO: 0.02 K/UL
BASOPHILS NFR BLD: 0.2 %
BILIRUB SERPL-MCNC: 1.1 MG/DL
BNP SERPL-MCNC: 443 PG/ML
BUN SERPL-MCNC: 22 MG/DL
BUN SERPL-MCNC: 22 MG/DL
BUN SERPL-MCNC: 23 MG/DL
C3 SERPL-MCNC: 73 MG/DL
C4 SERPL-MCNC: 16 MG/DL
CALCIUM SERPL-MCNC: 8.8 MG/DL
CALCIUM SERPL-MCNC: 8.9 MG/DL
CALCIUM SERPL-MCNC: 8.9 MG/DL
CHLORIDE SERPL-SCNC: 106 MMOL/L
CHLORIDE SERPL-SCNC: 106 MMOL/L
CHLORIDE SERPL-SCNC: 108 MMOL/L
CO2 SERPL-SCNC: 19 MMOL/L
CREAT SERPL-MCNC: 1.4 MG/DL
CREAT SERPL-MCNC: 1.6 MG/DL
CREAT SERPL-MCNC: 1.7 MG/DL
CRP SERPL-MCNC: 189.2 MG/L
DELSYS: ABNORMAL
DELSYS: ABNORMAL
DIFFERENTIAL METHOD: ABNORMAL
EOSINOPHIL # BLD AUTO: 0.1 K/UL
EOSINOPHIL NFR BLD: 0.8 %
ERYTHROCYTE [DISTWIDTH] IN BLOOD BY AUTOMATED COUNT: 19.2 %
ERYTHROCYTE [SEDIMENTATION RATE] IN BLOOD BY WESTERGREN METHOD: 87 MM/HR
EST. GFR  (AFRICAN AMERICAN): 47.2 ML/MIN/1.73 M^2
EST. GFR  (AFRICAN AMERICAN): 50.8 ML/MIN/1.73 M^2
EST. GFR  (AFRICAN AMERICAN): 59.7 ML/MIN/1.73 M^2
EST. GFR  (NON AFRICAN AMERICAN): 40.8 ML/MIN/1.73 M^2
EST. GFR  (NON AFRICAN AMERICAN): 43.9 ML/MIN/1.73 M^2
EST. GFR  (NON AFRICAN AMERICAN): 51.6 ML/MIN/1.73 M^2
ESTIMATED PA SYSTOLIC PRESSURE: 51.56
GLUCOSE SERPL-MCNC: 113 MG/DL
GLUCOSE SERPL-MCNC: 133 MG/DL
GLUCOSE SERPL-MCNC: 94 MG/DL
GRAM STN SPEC: NORMAL
HCO3 UR-SCNC: 18.4 MMOL/L (ref 24–28)
HCO3 UR-SCNC: 20.2 MMOL/L (ref 24–28)
HCT VFR BLD AUTO: 25.2 %
HGB BLD-MCNC: 8.2 G/DL
IMM GRANULOCYTES # BLD AUTO: 0.07 K/UL
IMM GRANULOCYTES NFR BLD AUTO: 0.6 %
LYMPHOCYTES # BLD AUTO: 0.7 K/UL
LYMPHOCYTES NFR BLD: 5.9 %
MAGNESIUM SERPL-MCNC: 1.9 MG/DL
MCH RBC QN AUTO: 27.2 PG
MCHC RBC AUTO-ENTMCNC: 32.5 G/DL
MCV RBC AUTO: 84 FL
MITRAL VALVE MOBILITY: ABNORMAL
MITRAL VALVE REGURGITATION: ABNORMAL
MONOCYTES # BLD AUTO: 0.8 K/UL
MONOCYTES NFR BLD: 6.5 %
NEUTROPHILS # BLD AUTO: 10 K/UL
NEUTROPHILS NFR BLD: 86 %
NRBC BLD-RTO: 0 /100 WBC
PCO2 BLDA: 25.8 MMHG (ref 35–45)
PCO2 BLDA: 28.4 MMHG (ref 35–45)
PH SMN: 7.42 [PH] (ref 7.35–7.45)
PH SMN: 7.5 [PH] (ref 7.35–7.45)
PHOSPHATE SERPL-MCNC: 3.7 MG/DL
PHOSPHATE SERPL-MCNC: 3.7 MG/DL
PHOSPHATE SERPL-MCNC: 4.2 MG/DL
PLATELET # BLD AUTO: 162 K/UL
PMV BLD AUTO: 10.1 FL
PO2 BLDA: 55 MMHG (ref 80–100)
PO2 BLDA: 60 MMHG (ref 80–100)
POC BE: -3 MMOL/L
POC BE: -6 MMOL/L
POC SATURATED O2: 89 % (ref 95–100)
POC SATURATED O2: 93 % (ref 95–100)
POC TCO2: 19 MMOL/L (ref 23–27)
POC TCO2: 21 MMOL/L (ref 23–27)
POTASSIUM SERPL-SCNC: 4.9 MMOL/L
POTASSIUM SERPL-SCNC: 5.2 MMOL/L
POTASSIUM SERPL-SCNC: 5.4 MMOL/L
PROCALCITONIN SERPL IA-MCNC: 0.36 NG/ML
PROT SERPL-MCNC: 6.5 G/DL
RBC # BLD AUTO: 3.01 M/UL
RETIRED EF AND QEF - SEE NOTES: 68 (ref 55–65)
SAMPLE: ABNORMAL
SAMPLE: ABNORMAL
SITE: ABNORMAL
SITE: ABNORMAL
SODIUM SERPL-SCNC: 133 MMOL/L
SODIUM SERPL-SCNC: 134 MMOL/L
SODIUM SERPL-SCNC: 137 MMOL/L
TRICUSPID VALVE REGURGITATION: ABNORMAL
TROPONIN I SERPL DL<=0.01 NG/ML-MCNC: 0.05 NG/ML
WBC # BLD AUTO: 11.61 K/UL

## 2018-02-25 PROCEDURE — 63600175 PHARM REV CODE 636 W HCPCS: Performed by: HOSPITALIST

## 2018-02-25 PROCEDURE — 99291 CRITICAL CARE FIRST HOUR: CPT | Mod: ,,, | Performed by: NURSE PRACTITIONER

## 2018-02-25 PROCEDURE — 83520 IMMUNOASSAY QUANT NOS NONAB: CPT

## 2018-02-25 PROCEDURE — 94640 AIRWAY INHALATION TREATMENT: CPT

## 2018-02-25 PROCEDURE — 83735 ASSAY OF MAGNESIUM: CPT

## 2018-02-25 PROCEDURE — 86160 COMPLEMENT ANTIGEN: CPT

## 2018-02-25 PROCEDURE — 87632 RESP VIRUS 6-11 TARGETS: CPT

## 2018-02-25 PROCEDURE — 25000003 PHARM REV CODE 250: Performed by: HOSPITALIST

## 2018-02-25 PROCEDURE — 87077 CULTURE AEROBIC IDENTIFY: CPT

## 2018-02-25 PROCEDURE — 85025 COMPLETE CBC W/AUTO DIFF WBC: CPT

## 2018-02-25 PROCEDURE — 25000242 PHARM REV CODE 250 ALT 637 W/ HCPCS: Performed by: NURSE PRACTITIONER

## 2018-02-25 PROCEDURE — 86160 COMPLEMENT ANTIGEN: CPT | Mod: 59

## 2018-02-25 PROCEDURE — 99900035 HC TECH TIME PER 15 MIN (STAT)

## 2018-02-25 PROCEDURE — 36600 WITHDRAWAL OF ARTERIAL BLOOD: CPT

## 2018-02-25 PROCEDURE — 86255 FLUORESCENT ANTIBODY SCREEN: CPT

## 2018-02-25 PROCEDURE — 27100171 HC OXYGEN HIGH FLOW UP TO 24 HOURS

## 2018-02-25 PROCEDURE — 63600175 PHARM REV CODE 636 W HCPCS: Performed by: PHYSICIAN ASSISTANT

## 2018-02-25 PROCEDURE — 80069 RENAL FUNCTION PANEL: CPT | Mod: 91

## 2018-02-25 PROCEDURE — 84145 PROCALCITONIN (PCT): CPT

## 2018-02-25 PROCEDURE — 63600175 PHARM REV CODE 636 W HCPCS: Performed by: NURSE PRACTITIONER

## 2018-02-25 PROCEDURE — 87070 CULTURE OTHR SPECIMN AEROBIC: CPT

## 2018-02-25 PROCEDURE — 87040 BLOOD CULTURE FOR BACTERIA: CPT | Mod: 59

## 2018-02-25 PROCEDURE — 36415 COLL VENOUS BLD VENIPUNCTURE: CPT

## 2018-02-25 PROCEDURE — 27100092 HC HIGH FLOW DELIVERY CANNULA

## 2018-02-25 PROCEDURE — 84484 ASSAY OF TROPONIN QUANT: CPT

## 2018-02-25 PROCEDURE — 25000003 PHARM REV CODE 250: Performed by: NURSE PRACTITIONER

## 2018-02-25 PROCEDURE — 25000242 PHARM REV CODE 250 ALT 637 W/ HCPCS: Performed by: PHYSICIAN ASSISTANT

## 2018-02-25 PROCEDURE — 87205 SMEAR GRAM STAIN: CPT

## 2018-02-25 PROCEDURE — 83880 ASSAY OF NATRIURETIC PEPTIDE: CPT

## 2018-02-25 PROCEDURE — 80053 COMPREHEN METABOLIC PANEL: CPT

## 2018-02-25 PROCEDURE — 85651 RBC SED RATE NONAUTOMATED: CPT

## 2018-02-25 PROCEDURE — 86038 ANTINUCLEAR ANTIBODIES: CPT

## 2018-02-25 PROCEDURE — 20000000 HC ICU ROOM

## 2018-02-25 PROCEDURE — 86141 C-REACTIVE PROTEIN HS: CPT

## 2018-02-25 PROCEDURE — 87186 SC STD MICRODIL/AGAR DIL: CPT

## 2018-02-25 RX ORDER — IPRATROPIUM BROMIDE AND ALBUTEROL SULFATE 2.5; .5 MG/3ML; MG/3ML
3 SOLUTION RESPIRATORY (INHALATION) EVERY 6 HOURS
Status: DISCONTINUED | OUTPATIENT
Start: 2018-02-25 | End: 2018-02-27

## 2018-02-25 RX ORDER — DOCUSATE SODIUM 50 MG/5ML
50 LIQUID ORAL ONCE
Status: COMPLETED | OUTPATIENT
Start: 2018-02-25 | End: 2018-02-25

## 2018-02-25 RX ORDER — BENZONATATE 100 MG/1
100 CAPSULE ORAL 3 TIMES DAILY PRN
Status: DISCONTINUED | OUTPATIENT
Start: 2018-02-25 | End: 2018-03-07 | Stop reason: HOSPADM

## 2018-02-25 RX ORDER — OXYCODONE HYDROCHLORIDE 5 MG/1
5 TABLET ORAL EVERY 6 HOURS PRN
Status: DISCONTINUED | OUTPATIENT
Start: 2018-02-25 | End: 2018-03-07 | Stop reason: HOSPADM

## 2018-02-25 RX ORDER — FUROSEMIDE 10 MG/ML
40 INJECTION INTRAMUSCULAR; INTRAVENOUS ONCE
Status: COMPLETED | OUTPATIENT
Start: 2018-02-25 | End: 2018-02-25

## 2018-02-25 RX ORDER — ACETAMINOPHEN 325 MG/1
650 TABLET ORAL EVERY 6 HOURS PRN
Status: DISCONTINUED | OUTPATIENT
Start: 2018-02-25 | End: 2018-03-07 | Stop reason: HOSPADM

## 2018-02-25 RX ORDER — AZITHROMYCIN 250 MG/1
500 TABLET, FILM COATED ORAL DAILY
Status: DISCONTINUED | OUTPATIENT
Start: 2018-02-25 | End: 2018-02-25

## 2018-02-25 RX ADMIN — FLUTICASONE PROPIONATE 50 MCG: 50 SPRAY, METERED NASAL at 09:02

## 2018-02-25 RX ADMIN — IPRATROPIUM BROMIDE AND ALBUTEROL SULFATE 3 ML: .5; 3 SOLUTION RESPIRATORY (INHALATION) at 07:02

## 2018-02-25 RX ADMIN — SODIUM CHLORIDE 3 G: 9 INJECTION, SOLUTION INTRAVENOUS at 02:02

## 2018-02-25 RX ADMIN — BENZONATATE 100 MG: 100 CAPSULE ORAL at 10:02

## 2018-02-25 RX ADMIN — DOCUSATE SODIUM 50 MG: 50 LIQUID ORAL at 10:02

## 2018-02-25 RX ADMIN — IPRATROPIUM BROMIDE AND ALBUTEROL SULFATE 3 ML: .5; 3 SOLUTION RESPIRATORY (INHALATION) at 12:02

## 2018-02-25 RX ADMIN — FUROSEMIDE 40 MG: 10 INJECTION, SOLUTION INTRAMUSCULAR; INTRAVENOUS at 10:02

## 2018-02-25 RX ADMIN — LEVALBUTEROL 1.25 MG: 1.25 SOLUTION, CONCENTRATE RESPIRATORY (INHALATION) at 02:02

## 2018-02-25 RX ADMIN — FUROSEMIDE 40 MG: 10 INJECTION, SOLUTION INTRAMUSCULAR; INTRAVENOUS at 12:02

## 2018-02-25 RX ADMIN — MIRTAZAPINE 30 MG: 15 TABLET, FILM COATED ORAL at 08:02

## 2018-02-25 RX ADMIN — ACETAMINOPHEN 650 MG: 325 TABLET ORAL at 04:02

## 2018-02-25 RX ADMIN — AMLODIPINE BESYLATE 10 MG: 10 TABLET ORAL at 09:02

## 2018-02-25 RX ADMIN — LEVALBUTEROL 1.25 MG: 1.25 SOLUTION, CONCENTRATE RESPIRATORY (INHALATION) at 05:02

## 2018-02-25 RX ADMIN — SODIUM CHLORIDE 3 G: 9 INJECTION, SOLUTION INTRAVENOUS at 09:02

## 2018-02-25 RX ADMIN — TAMSULOSIN HYDROCHLORIDE 0.8 MG: 0.4 CAPSULE ORAL at 08:02

## 2018-02-25 RX ADMIN — BENZONATATE 100 MG: 100 CAPSULE ORAL at 05:02

## 2018-02-25 RX ADMIN — AZITHROMYCIN 500 MG: 500 INJECTION, POWDER, LYOPHILIZED, FOR SOLUTION INTRAVENOUS at 09:02

## 2018-02-25 RX ADMIN — SODIUM CHLORIDE 1000 MG: 9 INJECTION, SOLUTION INTRAVENOUS at 03:02

## 2018-02-25 RX ADMIN — SODIUM CHLORIDE 3 G: 9 INJECTION, SOLUTION INTRAVENOUS at 08:02

## 2018-02-25 NOTE — SIGNIFICANT EVENT
"Pulmonary/CCS Staff Addendum    Patient evaluated on rounds.  He's still requiring high flow nasal oxygen to maintain SpO2, and CXR showed rapid progression of bilateral infiltrates.  History notable for anemia and non-productive cough dating about two full months.  At time of EGD, he had some sort of "respiratory problem" and has had nosebleeds and/or hemoptysis since that time.  Urinalysis shows microscopic hematuria that predates catheter placement.  Creatinine has been ~1.6 with creatinine clearance ~ 45 mL/min.    Leading considerations to explain CXR pattern would be pulmonary edema (cardiac vs non-cardiogenic) or alveolar hemorrhage (possible pulmonary-renal syndrome).    · Follow up echocardiogram results  · Follow up respiratory viral panel  · Check lab work => ANCA panel; anti-GBM antibody; MAGNO  · If situation progresses to intubation, would have low threshold for bronchoscopy/BAL to rule out alveolar hemorrhage or other causes of bilateral infiltrates.    Clem Sahu MD  Pulmonary/Critical Care Medicine    "

## 2018-02-25 NOTE — H&P
"[facility]  Critical Care Medicine  History & Physical    Patient Name: Rodolfo Messina  MRN: [id number]  Admission Date: 2/21/2018  Hospital Length of Stay: 4 days  Code Status: Full Code  Attending Physician: Clem Sahu MD   Primary Care Provider: Deric Claudio MD   Principal Problem: Symptomatic anemia    Subjective:     HPI:  Patient is a 67 y.o. male with significant past medical history of HTN and essential tremor, recently with urinary retention requiring indwelling lackey presented to hospital complaining of low Hgb. The patient was recently admitted 1/27/18 - 01/28/18 for symptomatic anemia associated with fatigue. He received one unit blood transfusion with improved Hgb from 7 to 8.5. Follow up EGD and colonoscopy on 02/01/18 showed non bleeding erosive gastropathy, internal hemorrhoid and diverticulosis in sigmoid and descending colon. The patient had routine labs done by his PCP which revealed hgb 7.1. The patient's PCP called him and told him to report to the Emergency Department for further workup.     The patient endorses a non-productive cough since December, with progression to green productive sputum x3 days and scant blood tinged today. Prior to this hospitalization the patient reports a few day history of fever/chills and malaise. He currently endorses a "grabbing" feeling in his chest when he inhales which is only relieved with coughing as well as abdominal musculature pain associated with coughing.     Work up in the ED revealed a hgb of 6.7, creatinine 2 (baseline ~ 1.1). UA was negative for infection. Stool was neg for occult blood.  The patient was admitted to Hospital Medicine for further management. CTA chest revealed multiple pulmonary nodules for which Pulmonology was consulted (patient has occupational asbestos exposure) > recommended adding duo nebs and outpatient follow up). The patient has received 2 units PRBCs during this hospitalization, the last being " ~ 2am on 2/24. The patient developed hypoxemia overnight 2/23 into 2/24 (87% on RA) and required 2-3L NC. CXRY on 2/24 revealed interval development of bilateral patchy interstitial and parietal attenuation reflective of edema vs infection. The patient was given a x1 dose of lasix 20mg and maintenance IVF were discontinued. On the evening of 2/24 the patient developed worsening infiltrates and increasing oxygen requirements, now needing comfort flow 30L 80% to maintain oxygen levels. Critical Care Medicine was consulted for hypoxemic respiratory failure.     Upon initial evaluation the patient had decreasing oxygen requirements, subjectively felt better and was deemed stable for the floor. Upon re-eval, oxygen requirements had increased back to 30L 70% and it was decided to move the patient to the ICU for closer monitoring.     Hospital/ICU Course:  No notes on file     Past Medical History:   Diagnosis Date    ANNA (acute kidney injury) 2/22/2018    Anemia     Anxiety     BPH (benign prostatic hyperplasia)     Diverticulosis     Hypertension     Urinary retention        Past Surgical History:   Procedure Laterality Date    APPENDECTOMY      COLONOSCOPY      COLONOSCOPY N/A 2/1/2018    Procedure: COLONOSCOPY;  Surgeon: Richar Payan MD;  Location: [address]);  Service: Endoscopy;  Laterality: N/A;  PM prep    EYE SURGERY Right     cataract extraction    FINGER SURGERY      FRACTURE SURGERY Right     index finger    TONSILLECTOMY         Review of patient's allergies indicates:   Allergen Reactions    Ciprofloxacin     Flagyl [metronidazole]     Lisinopril Other (See Comments)     cough    Mysoline [primidone]     Omnicef [cefdinir]     Shellfish containing products        Family History     Problem Relation (Age of Onset)    Heart disease Mother, Father    No Known Problems Sister, Brother    Stroke Mother        Social History Main Topics    Smoking status: Never Smoker     Smokeless tobacco: Never Used    Alcohol use Yes      Comment: none recently    Drug use: No    Sexual activity: Not Currently     Partners: Female      Review of Systems   Constitutional: Negative.    HENT: Negative.    Eyes: Negative.    Respiratory: Positive for cough, chest tightness and shortness of breath. Negative for wheezing.    Cardiovascular: Negative for chest pain, palpitations and leg swelling.   Gastrointestinal: Positive for abdominal pain. Negative for abdominal distention, anal bleeding, blood in stool, nausea and vomiting.        Abdominal musculature pain secondary to coughing   Endocrine: Negative.    Genitourinary:        Urinary retention requiring indwelling lackey   Musculoskeletal: Negative.    Skin: Negative.    Allergic/Immunologic: Negative.    Neurological: Negative.    Hematological: Negative.    Psychiatric/Behavioral: Negative.      Objective:     Vital Signs (Most Recent):  Temp: 99.4 °F (37.4 °C) (02/25/18 0558)  Pulse: (!) 116 (02/25/18 0558)  Resp: 20 (02/25/18 0558)  BP: (!) 150/67 (02/25/18 0558)  SpO2: (!) 85 % (02/25/18 0558) Vital Signs (24h Range):  Temp:  [98 °F (36.7 °C)-99.9 °F (37.7 °C)] 99.4 °F (37.4 °C)  Pulse:  [] 116  Resp:  [18-26] 20  SpO2:  [83 %-98 %] 85 %  BP: (132-179)/(66-86) 150/67   Weight: 74.4 kg (164 lb)  Body mass index is 24.21 kg/m².      Intake/Output Summary (Last 24 hours) at 02/25/18 0626  Last data filed at 02/25/18 0611   Gross per 24 hour   Intake              790 ml   Output             5450 ml   Net            -4660 ml       Physical Exam   Constitutional: He is oriented to person, place, and time. He appears well-developed and well-nourished. No distress.   HENT:   Head: Normocephalic and atraumatic.   Right Ear: External ear normal.   Left Ear: External ear normal.   Nose: Nose normal.   Mouth/Throat: Oropharynx is clear and moist.   Eyes: Conjunctivae and EOM are normal. Pupils are equal, round, and reactive to light.   Neck:  Normal range of motion. Neck supple.   Cardiovascular: Regular rhythm, normal heart sounds and intact distal pulses.  Tachycardia present.    No murmur heard.  Pulmonary/Chest: Effort normal and breath sounds normal. No accessory muscle usage. No respiratory distress.   On comfort flow 30L 70%   Abdominal: Soft. Bowel sounds are normal. There is no tenderness.   Genitourinary:   Genitourinary Comments: Lainez draining clear yellow urine   Musculoskeletal: Normal range of motion.   Neurological: He is alert and oriented to person, place, and time.   Skin: Skin is warm and dry. Capillary refill takes less than 2 seconds. He is not diaphoretic.   Nursing note and vitals reviewed.      Vents:  Oxygen Concentration (%): 50 (02/25/18 0558)  Lines/Drains/Airways     Drain                 Urethral Catheter 02/15/18 16 Fr. 10 days          Peripheral Intravenous Line                 Peripheral IV - Single Lumen 02/21/18 2120 Right Upper Arm 3 days              Significant Labs:    CBC/Anemia Profile:    Recent Labs  Lab 02/23/18  0722 02/23/18  1714 02/23/18  1816 02/24/18  0423 02/25/18  0226   WBC 6.43 6.26  --  9.74 11.61   HGB 7.9* 7.1*  --  8.4* 8.2*   HCT 24.5* 21.8*  --  25.8* 25.2*    149*  --  158 162   MCV 89 88  --  84 84   RDW 14.5 14.6*  --  18.6* 19.2*   [id number] 348  --   --   --   --    OCCULTBLOOD  --   --  Negative  --   --         Chemistries:    Recent Labs  Lab 02/24/18  0423 02/25/18  0226    134*   K 4.8 5.4*    106   CO2 19* 19*   BUN 22 23   CREATININE 1.6* 1.7*   CALCIUM 8.6* 8.9   ALBUMIN 2.6* 2.6*   PROT 6.3 6.5   BILITOT 1.5* 1.1*   ALKPHOS 78 79   ALT 26 35   AST 36 45*       All pertinent labs within the past 24 hours have been reviewed.    Significant Imaging: I have reviewed and interpreted all pertinent imaging results/findings within the past 24 hours.    Assessment/Plan:     Psychiatric   Anxiety    --holding home alprazolam given tenuous respiratory status         Pulmonary   Acute hypoxemic respiratory failure    --concern for HCAP vs pulmonary edema  --2D echo performed, read pending. Will repeat BNP today. Patient has no previously known history of heart failure. Additional diuresis as clinical picture dictates  --will broaden out antibiotics for HCAP and atypical coverage: Vanc, unasyn and azithromycin  --checking sputum cx, RSV panel & Bcx  --follow daily chest xrays  --continue comfort flow; wean as tolerated        Pulmonary nodules    --non-smoker, h/o occupational asbestos exposure  --pulmonology following; f/u outpatient  --repeat CT in 3-6 months            Cardiac/Vascular   HTN (hypertension)    --continue home amlodipine           ANNA (acute kidney injury)    --previously attributed to bactrim (started for proteus UTI), now d/c'd; baseline creatinine ~ 1.1  --Renal US with evidence of medical renal disease  --creatinine slowly improving  --balance renal function with need for diuresis        Oncology   Anemia    --no evidence of ongoing GIB, previous EGD and C scope without source of bleeding  --s/p 2 units this admission  --monitor           Case discussed with Dr. Martínez    Critical Care Time: 60 minutes  Critical secondary to Patient has a condition that poses threat to life and bodily function: acute hypoxemic respiratory failure     Critical care was time spent personally by me on the following activities: development of treatment plan with patient or surrogate and bedside caregivers, discussions with consultants, evaluation of patient's response to treatment, examination of patient, ordering and performing treatments and interventions, ordering and review of laboratory studies, ordering and review of radiographic studies, pulse oximetry, re-evaluation of patient's condition. This critical care time did not overlap with that of any other provider or involve time for any procedures.     Ana Daley NP  Critical Care Medicine  [facility]  [place]

## 2018-02-25 NOTE — HPI
Mr. Messina is a 67 y.o. male with significant past medical history of HTN and essential tremor, recently with urinary retention requiring indwelling lackey presented to hospital for symptomatic anemia. The patient was recently admitted 1/27/18 - 01/28/18 for symptomatic anemia associated with fatigue. He received one unit blood transfusion with improved Hgb from 7 to 8.5. Follow up EGD and colonoscopy on 02/01/18 showed non bleeding erosive gastropathy, internal hemorrhoid and diverticulosis in sigmoid and descending colon. The patient had routine labs done by his PCP which revealed hgb 7.1. The patient's PCP called him and told him to report to the Emergency Department for further workup.     The patient endorsed a non-productive cough since December, with progression to green productive sputum x3 days and scant blood tinged sputum upon admission. Prior to this hospitalization the patient reports having fevers that started around [place] time that would be intermittent (several times a week) and low grade. He did have upper respiratory/viral symptoms around [place] time and received IM steroids and a Z pack. Fevers, malaise, anorexia, and 10 pound weight loss occurred since then. He denies any orthopnea, dyspnea on exertion, chest pain, palpitations. He denies recent cracked teeth, dental carries, oral ulcerations, or skin breakdown other than a cat scratch that occurred about 10 days prior to admission (now resolved).     Work up in the ED revealed a hgb of 6.7, ANNA with creatinine 2 (baseline ~ 1.1). UA was negative for infection. Stool was neg for occult blood.  The patient was admitted to Hospital Medicine for further management. CTA chest revealed multiple pulmonary nodules for which Pulmonology was consulted (patient has occupational asbestos exposure) and recommended adding duo nebs and outpatient follow up. The patient has received 2 units PRBCs during this hospitalization from 2/22 to the night of 2/23  along with about 3 L crystalloids during that time. He then developed acute hypoxemia overnight 2/23 into 2/24 (87% on RA) and required 2-3L NC. Follow up chest xray on 2/24 revealed interval development of bilateral patchy interstitial and parietal attenuation reflective of edema vs infection. The patient was given a x1 dose of lasix 20mg and maintenance IVF were discontinued. The patient developed worsening infiltrates and increasing oxygen requirements, requiring comfort flow 30L 80% to maintain oxygen levels. The patient was moved to MICU for intubation watch.

## 2018-02-25 NOTE — PLAN OF CARE
Problem: Physical Therapy Goal  Goal: Physical Therapy Goal  Goals to be met by: 10 days (3/6)    Patient will increase functional independence with mobility by performin. Sit to stand transfer with Modified [place]  2. Gait  x 200 feet with Modified [place] using no assistive device  3. Lower extremity exercise program x20 reps per handout, with independence to improve muscular strength and endurance.          Outcome: Outcome(s) achieved Date Met: 18  Pt transferred to ICU. Current PT orders discontinued. Please place new orders when pt is medically stable.    Haylee Padgett, PT, DPT  2018

## 2018-02-25 NOTE — PLAN OF CARE
Problem: Patient Care Overview  Goal: Plan of Care Review  Outcome: Ongoing (interventions implemented as appropriate)  Pt tolerating comfort flow 30L 55% FiO2. Pt c/o pain in legs and chest, tylenol administered. Wife at bedside, attentive to pt

## 2018-02-25 NOTE — PLAN OF CARE
Problem: Patient Care Overview  Goal: Plan of Care Review  Outcome: Ongoing (interventions implemented as appropriate)  Plan of care reviewed with pt and his wife . Pt is A,A,O x 4 . Stable V/S . Pt C/O SOB and cough and generalized pain when he coughs , PRN pain and cough medications given as needed . Pt is able to ambulate to the bathroom with assist . Pt is free of falls and injuries per shift , fall precautions maintained and family at bedside . Breathing treatment given during the day . Pt received a dose of amoxicillin -clavulanate po today and no reactions during the day . Pt received one dose of lasix IV today . Lainez catheter care done .

## 2018-02-25 NOTE — NURSING
Pt aaox4, denies SOB. O2 saturation 83-85% . Notified Hospital Medicine MANUELA Hopson NP. ELENO Campos at bedside.

## 2018-02-25 NOTE — PROGRESS NOTES
Report given to the nurse in ICU , waiting for respiratory tech to help transferring the pt to ICU .

## 2018-02-25 NOTE — ASSESSMENT & PLAN NOTE
--concern for HCAP vs pulmonary edema  --2D echo performed, read pending. Will repeat BNP today. Patient has no previously known history of heart failure. Additional diuresis as clinical picture dictates  --will broaden out antibiotics for HCAP and atypical coverage: Vanc, unasyn and azithromycin  --checking sputum cx, RSV panel & Bcx  --follow daily chest xrays  --continue comfort flow; wean as tolerated

## 2018-02-25 NOTE — PT/OT/SLP DISCHARGE
Physical Therapy Discharge Summary    Name: Rodolfo Messina  MRN: [id number]   Principal Problem: Acute hypoxemic respiratory failure     Patient Discharged from acute Physical Therapy on 2018  Please refer to prior PT noted date on 2018 for functional status.     Assessment:     Patient transferred to lower level of care secondary to respiratory distress    Objective:     GOALS:    Physical Therapy Goals        Problem: Physical Therapy Goal    Goal Priority Disciplines Outcome Goal Variances Interventions   Physical Therapy Goal     PT/OT, PT Ongoing (interventions implemented as appropriate)     Description:  Goals to be met by: 10 days (3/6)    Patient will increase functional independence with mobility by performin. Sit to stand transfer with Modified [place]  2. Gait  x 200 feet with Modified [place] using no assistive device  3. Lower extremity exercise program x20 reps per handout, with independence to improve muscular strength and endurance.                           Reasons for Discontinuation of Therapy Services  Patient is unable to continue work toward goals because of medical or psychosocial complications.      Plan:     Patient Discharged to: Critical ICU.    Haylee Padgett, PT, DPT  2018

## 2018-02-25 NOTE — NURSING
Pt sitting up at bedside. Remains aaox4. MITCH Castellanos, CCRN and RT at bedside. Pt currently on Venti Mask 12 liters 50% and O2 saturation 93%.

## 2018-02-25 NOTE — NURSING
Notified MITCH Castellanos and  Tom JOHANSEN O2 saturation between 82-85% Pt remains aaox4, denies SOB, but still restless. See New orders.

## 2018-02-25 NOTE — ASSESSMENT & PLAN NOTE
--non-smoker, h/o occupational asbestos exposure  --pulmonology following; f/u outpatient  --repeat CT in 3-6 months

## 2018-02-25 NOTE — CONSULTS
Consult acknowledged, patient evaluated. Full consult note to follow.     Ana Daley NP  Critical Care Medicine

## 2018-02-25 NOTE — CARE UPDATE
Patient now on comfort flow, 25L 100% FiO2, oxygen saturation 96%.  Dr. Martínez with [facility] called and made aware of patient status and increasing oxygen requirements from 2L NC to now comfort flow at 100% FiO2 in two hour time frame.  ABENA Stoner also called and made aware of concerns.  Will place consult for critical care medicine to evaluate patient.

## 2018-02-25 NOTE — SUBJECTIVE & OBJECTIVE
Past Medical History:   Diagnosis Date    ANNA (acute kidney injury) 2/22/2018    Anemia     Anxiety     BPH (benign prostatic hyperplasia)     Diverticulosis     Hypertension     Urinary retention        Past Surgical History:   Procedure Laterality Date    APPENDECTOMY      COLONOSCOPY      COLONOSCOPY N/A 2/1/2018    Procedure: COLONOSCOPY;  Surgeon: Richar Payan MD;  Location: [address];  Service: Endoscopy;  Laterality: N/A;  PM prep    EYE SURGERY Right     cataract extraction    FINGER SURGERY      FRACTURE SURGERY Right     index finger    TONSILLECTOMY         Review of patient's allergies indicates:   Allergen Reactions    Ciprofloxacin     Flagyl [metronidazole]     Lisinopril Other (See Comments)     cough    Mysoline [primidone]     Omnicef [cefdinir]     Shellfish containing products        Family History     Problem Relation (Age of Onset)    Heart disease Mother, Father    No Known Problems Sister, Brother    Stroke Mother        Social History Main Topics    Smoking status: Never Smoker    Smokeless tobacco: Never Used    Alcohol use Yes      Comment: none recently    Drug use: No    Sexual activity: Not Currently     Partners: Female      Review of Systems   Constitutional: Negative.    HENT: Negative.    Eyes: Negative.    Respiratory: Positive for cough, chest tightness and shortness of breath. Negative for wheezing.    Cardiovascular: Negative for chest pain, palpitations and leg swelling.   Gastrointestinal: Positive for abdominal pain. Negative for abdominal distention, anal bleeding, blood in stool, nausea and vomiting.        Abdominal musculature pain secondary to coughing   Endocrine: Negative.    Genitourinary:        Urinary retention requiring indwelling lackey   Musculoskeletal: Negative.    Skin: Negative.    Allergic/Immunologic: Negative.    Neurological: Negative.    Hematological: Negative.    Psychiatric/Behavioral: Negative.      Objective:      Vital Signs (Most Recent):  Temp: 99.8 °F (37.7 °C) (02/25/18 0413)  Pulse: (!) 113 (02/25/18 0413)  Resp: 20 (02/25/18 0413)  BP: 136/74 (02/25/18 0413)  SpO2: (!) 89 % (02/25/18 0413) Vital Signs (24h Range):  Temp:  [98 °F (36.7 °C)-99.9 °F (37.7 °C)] 99.8 °F (37.7 °C)  Pulse:  [] 113  Resp:  [18-26] 20  SpO2:  [83 %-98 %] 89 %  BP: (132-179)/(66-86) 136/74   Weight: 74.4 kg (164 lb)  Body mass index is 24.21 kg/m².      Intake/Output Summary (Last 24 hours) at 02/25/18 0514  Last data filed at 02/25/18 0200   Gross per 24 hour   Intake              790 ml   Output             4450 ml   Net            -3660 ml       Physical Exam    Vents:  Oxygen Concentration (%): 50 (02/25/18 0413)  Lines/Drains/Airways     Drain                 Urethral Catheter 02/15/18 16 Fr. 10 days          Peripheral Intravenous Line                 Peripheral IV - Single Lumen 02/21/18 2120 Right Upper Arm 3 days              Significant Labs:    CBC/Anemia Profile:    Recent Labs  Lab 02/23/18  0722 02/23/18  1714 02/23/18  1816 02/24/18  0423 02/25/18 0226   WBC 6.43 6.26  --  9.74 11.61   HGB 7.9* 7.1*  --  8.4* 8.2*   HCT 24.5* 21.8*  --  25.8* 25.2*    149*  --  158 162   MCV 89 88  --  84 84   RDW 14.5 14.6*  --  18.6* 19.2*   [id number] 348  --   --   --   --    OCCULTBLOOD  --   --  Negative  --   --         Chemistries:    Recent Labs  Lab 02/24/18  0423 02/25/18  0226    134*   K 4.8 5.4*    106   CO2 19* 19*   BUN 22 23   CREATININE 1.6* 1.7*   CALCIUM 8.6* 8.9   ALBUMIN 2.6* 2.6*   PROT 6.3 6.5   BILITOT 1.5* 1.1*   ALKPHOS 78 79   ALT 26 35   AST 36 45*       All pertinent labs within the past 24 hours have been reviewed.    Significant Imaging: I have reviewed and interpreted all pertinent imaging results/findings within the past 24 hours.

## 2018-02-25 NOTE — NURSING
"Call placed to [facility] to inform of , O2 sat 83% of 2 Liters. Pt states "I just can't rest" Denies SOB. Pt very anxious. Also notified Charge Nurse and Rapid Nurse CCRN. MITCH Núñez stated he would come see pt. CCRN at bedside with pt now. Will continue to assess.  "

## 2018-02-25 NOTE — ASSESSMENT & PLAN NOTE
--no evidence of ongoing GIB, previous EGD and C scope without source of bleeding  --s/p 2 units this admission  --monitor

## 2018-02-25 NOTE — CONSULTS
"[facility]  Critical Care Medicine  Consult Note    Patient Name: Rodolfo Messina  MRN: [id number]  Admission Date: 2/21/2018  Hospital Length of Stay: 4 days  Code Status: Full Code  Attending Physician: Babak Magallanes MD   Primary Care Provider: Deric Claudio MD   Principal Problem: Symptomatic anemia    Consults  Subjective:     HPI:  Patient is a 67 y.o. male with significant past medical history of HTN and essential tremor, recently with urinary retention requiring indwelling lackey presented to hospital complaining of low Hgb. The patient was recently admitted 1/27/18 - 01/28/18 for symptomatic anemia associated with fatigue. He received one unit blood transfusion with improved Hgb from 7 to 8.5. Follow up EGD and colonoscopy on 02/01/18 showed non bleeding erosive gastropathy, internal hemorrhoid and diverticulosis in sigmoid and descending colon. The patient had routine labs done by his PCP which revealed hgb 7.1. The patient's PCP called him and told him to report to the Emergency Department for further workup.     The patient endorses a non-productive cough since December, with progression to green productive sputum x3 days and scant blood tinged today. Prior to this hospitalization the patient reports a few day history of fever/chills and malaise. He currently endorses a "grabbing" feeling in his chest when he inhales which is only relieved with coughing as well as abdominal musculature pain associated with coughing.     Work up in the ED revealed a hgb of 6.7, creatinine 2 (baseline ~ 1.1). UA was negative for infection. Stool was neg for occult blood.  The patient was admitted to Hospital Medicine for further management. CTA chest revealed multiple pulmonary nodules for which Pulmonology was consulted (patient has occupational asbestos exposure) > recommended adding duo nebs and outpatient follow up). The patient has received 2 units PRBCs during this hospitalization, the last " being ~ 2am on 2/24. The patient developed hypoxemia overnight 2/23 into 2/24 (87% on RA) and required 2-3L NC. CXRY on 2/24 revealed interval development of bilateral patchy interstitial and parietal attenuation reflective of edema vs infection. The patient was given a x1 dose of lasix 20mg and maintenance IVF were discontinued. On the evening of 2/24 the patient developed worsening infiltrates and increasing oxygen requirements, now needing comfort flow 30L 80% to maintain oxygen levels. Critical Care Medicine was consulted for hypoxemic respiratory failure.     Hospital/ICU Course:  No notes on file    Past Medical History:   Diagnosis Date    ANNA (acute kidney injury) 2/22/2018    Anemia     Anxiety     BPH (benign prostatic hyperplasia)     Diverticulosis     Hypertension     Urinary retention        Past Surgical History:   Procedure Laterality Date    APPENDECTOMY      COLONOSCOPY      COLONOSCOPY N/A 2/1/2018    Procedure: COLONOSCOPY;  Surgeon: Richar Payan MD;  Location: [address]);  Service: Endoscopy;  Laterality: N/A;  PM prep    EYE SURGERY Right     cataract extraction    FINGER SURGERY      FRACTURE SURGERY Right     index finger    TONSILLECTOMY         Review of patient's allergies indicates:   Allergen Reactions    Ciprofloxacin     Flagyl [metronidazole]     Lisinopril Other (See Comments)     cough    Mysoline [primidone]     Omnicef [cefdinir]     Shellfish containing products        Family History     Problem Relation (Age of Onset)    Heart disease Mother, Father    No Known Problems Sister, Brother    Stroke Mother        Social History Main Topics    Smoking status: Never Smoker    Smokeless tobacco: Never Used    Alcohol use Yes      Comment: none recently    Drug use: No    Sexual activity: Not Currently     Partners: Female      Review of Systems   Constitutional: Negative.    HENT: Negative.    Eyes: Negative.    Respiratory: Positive for cough,  chest tightness and shortness of breath. Negative for wheezing.    Cardiovascular: Negative for chest pain, palpitations and leg swelling.   Gastrointestinal: Positive for abdominal pain. Negative for abdominal distention, anal bleeding, blood in stool, nausea and vomiting.        Abdominal musculature pain secondary to coughing   Endocrine: Negative.    Genitourinary:        Urinary retention requiring indwelling lackey   Musculoskeletal: Negative.    Skin: Negative.    Allergic/Immunologic: Negative.    Neurological: Negative.    Hematological: Negative.    Psychiatric/Behavioral: Negative.      Objective:     Vital Signs (Most Recent):  Temp: 99.8 °F (37.7 °C) (02/25/18 0413)  Pulse: (!) 113 (02/25/18 0413)  Resp: 20 (02/25/18 0413)  BP: 136/74 (02/25/18 0413)  SpO2: (!) 89 % (02/25/18 0413) Vital Signs (24h Range):  Temp:  [98 °F (36.7 °C)-99.9 °F (37.7 °C)] 99.8 °F (37.7 °C)  Pulse:  [] 113  Resp:  [18-26] 20  SpO2:  [83 %-98 %] 89 %  BP: (132-179)/(66-86) 136/74   Weight: 74.4 kg (164 lb)  Body mass index is 24.21 kg/m².      Intake/Output Summary (Last 24 hours) at 02/25/18 0514  Last data filed at 02/25/18 0200   Gross per 24 hour   Intake              790 ml   Output             4450 ml   Net            -3660 ml       Physical Exam    Vents:  Oxygen Concentration (%): 50 (02/25/18 0413)  Lines/Drains/Airways     Drain                 Urethral Catheter 02/15/18 16 Fr. 10 days          Peripheral Intravenous Line                 Peripheral IV - Single Lumen 02/21/18 2120 Right Upper Arm 3 days              Significant Labs:    CBC/Anemia Profile:    Recent Labs  Lab 02/23/18  0722 02/23/18  1714 02/23/18  1816 02/24/18  0423 02/25/18  0226   WBC 6.43 6.26  --  9.74 11.61   HGB 7.9* 7.1*  --  8.4* 8.2*   HCT 24.5* 21.8*  --  25.8* 25.2*    149*  --  158 162   MCV 89 88  --  84 84   RDW 14.5 14.6*  --  18.6* 19.2*   [id number] 348  --   --   --   --    OCCULTBLOOD  --   --  Negative  --   --          Chemistries:    Recent Labs  Lab 02/24/18  0423 02/25/18  0226    134*   K 4.8 5.4*    106   CO2 19* 19*   BUN 22 23   CREATININE 1.6* 1.7*   CALCIUM 8.6* 8.9   ALBUMIN 2.6* 2.6*   PROT 6.3 6.5   BILITOT 1.5* 1.1*   ALKPHOS 78 79   ALT 26 35   AST 36 45*       All pertinent labs within the past 24 hours have been reviewed.    Significant Imaging: I have reviewed and interpreted all pertinent imaging results/findings within the past 24 hours.    Assessment/Plan:     Renal/   Asymptomatic bacteriuria    --concern for HCAP vs pulmonary edema  --2D echo performed, read pending. Will repeat BNP today. Patient has no previously known history of heart failure. Additional dose of lasix x1 administered by primary team overnight. Would avoid continuous fluids and consider additional diuresis as clinical picture dictates  --will broaden out antibiotics for HCAP and atypical coverage: Vanc, unasyn and azithromycin  --checking sputum cx and RSV panel  --recommend following daily chest xrays    --on re-evaluation of the patient, able to titrate down on O2 requirements from 30L 80% comfort flow to 30L 50% comfort flow. Patient states he subjectively feels better. Feel that patient is stable for the floor at this time. Please continue to titrate down on O2 as tolerated.            Patient evaluated in conjunction with Dr. Martínez.     Thank you for your consult. I will sign off. Please contact us if you have any additional questions.   Please call [phone] if you feel that the patient has a decline in status and needs to be reevaluated by the Critical Care Service.      Ana Daley NP  Critical Care Medicine  [facility]

## 2018-02-25 NOTE — ASSESSMENT & PLAN NOTE
--previously attributed to bactrim (started for proteus UTI), now d/c'd; baseline creatinine ~ 1.1  --Renal US with evidence of medical renal disease  --creatinine slowly improving  --balance renal function with need for diuresis

## 2018-02-25 NOTE — ASSESSMENT & PLAN NOTE
--concern for HCAP vs pulmonary edema  --2D echo performed, read pending. Will repeat BNP today. Patient has no previously known history of heart failure. Additional dose of lasix x1 administered by primary team overnight. Would avoid continuous fluids and consider additional diuresis as clinical picture dictates  --will broaden out antibiotics for HCAP and atypical coverage: Vanc, unasyn and azithromycin  --checking sputum cx and RSV panel  --recommend following daily chest xrays    --on re-evaluation of the patient, able to titrate down on O2 requirements from 30L 80% comfort flow to 30L 50% comfort flow. Patient states he subjectively feels better. Feel that patient is stable for the floor at this time. Please continue to titrate down on O2 as tolerated.

## 2018-02-25 NOTE — SIGNIFICANT EVENT
Responded to bedside for evaluation of increased O2 requirements. CXR, ABG reviewed. Xopenex ordered and patient responded well to treatment, sating in 90s. Subsequently placed on high flow NC with saturations maintaining. Later called back to bedside for worsening O2 saturations. CXR resulted at this time, now with worse edema despite heavy urine output throughout the day. Patient give lasix 40 mg x 1, started on comfort flow, antibiotics broadened per critical care recommendations. Saturations again stabilized. Repeat ABG at 0300 not significantly changed, and patient more comfortable. Patient to remain on the floor with close monitoring.     MITCH Sanders  [facility] Medicine

## 2018-02-25 NOTE — PROGRESS NOTES
Respiratory tech at the bedside , pt transferred on nonrebreathing mask , all belonging sent with pt . Pt is A,A,O x 4 , stable V/S pt on continuous pulse oxymetry with O2 sat 96 % . Wife at the bedside . Pt transferred to room 3070 .

## 2018-02-25 NOTE — SIGNIFICANT EVENT
Called back to room by bedside RNYasemin, called from central telemetry oxygen saturation 83-85%.  On arrival, monitor shows patient oxygen saturation 84%.  At bedside, patient is restless in bed, unable to sleep, SpO2 on portable pulse oximeter correlating with continuous pulse ox.  Increased high flow NC to 12L.  ABENA Stoner, called and made aware, order for STAT ABG obtained and will review CXR results.  ABG = 7.42, PCO2 28.4, PO2 55, HCO3 18.4, SpO2 89%.  ABENA Stoner, called with results, will increase oxygen device to comfort flow, titrate to goal SpO2 > 89%, labs and lasix to be ordered.

## 2018-02-25 NOTE — SIGNIFICANT EVENT
Rapid Response Nurse Note     Rapid Response Metrics:     Admit Date: 2018  LOS: 3  Code Status: Full Code   Date of Consult: 2018  : 1950  Age: 67 y.o.  Weight:   Wt Readings from Last 1 Encounters:   18 74.4 kg (164 lb)     Race:   Sex: male  Bed: [address] [place] [address] A:   MRN: [id number]  Time Rapid Response Team page Received:   Time Rapid Response Team at Bedside:   Time Rapid Response Team left Bedside:   Was the patient discharged from an ICU this admission? no   Was the patient discharged from a PACU within last 24 hours? no  Did the patient receive conscious sedation/general anesthesia within last 24 hours? no  Was the patient in the ED within the past 24 hours? no  Was the patient started on NIPPV within the past 24 hours? no  Did this progress into an ARC or CPA: no  Attending Physician: Babak Magallanes MD  Primary Service: [facility] HOS[facility] MED B  Consult Requested By: Babak Magallanes MD   Rapid Response Indication(s): Respiratory  Disposition: Remain on floor    SITUATION:     Reason for Call:   Called to evaluate the patient for Respiratory    BACKGROUND:     Why is the patient in the hospital?: GIB  What changed?: Respiratory distress    ASSESSMENT:     What did you find: Received call from bedside RN, patient SpO2 83% on 2L NC, advised RN to titrate up oxygen and RRT RN coming to bedside now.  Upon assessment, patient is tachypenic breathing 34 per minute, shallow breaths.  Pulse oximeter showed SpO2 81% on 6L NC (utilized two different pulse oximeter devices).  Patient states he is anxious and having trouble catching his breath.  Previous CXR/CT scan show lung nodules which are currently unknown etiology, infectious vs malignant process - heme/onc following.  Also has UTI - ID following.    RECOMMENDATIONS:     We recommend: Obtained Venti mask, placed on 12L, 55%, SpO2 remains 89-94%.  Breathing treatments (duo-nebs) were previously given q4hr while awake, but order  changed to q4PRN d/t tachycardia.  Patient last received breathing treatment on 2/24/18 @ 0749.  HR is baseline 100s-110s, but is now 120s-130s.  Xopenex 1.25 breathing treatment and CXR ordered by ABENA Castellanos.  Recommend resume breathing treatments, Xopenex q4hr and continuous pulse oximeter monitoring on telemetry box.      FOLLOW-UP/CONTINGENCY PLAN:     Patient needs a second visit at : 0800    Call back the rapid Response Nurse at x [phone]  For additional       PHYSICIAN ESCALATION:     Orders received and case discussed with ABENA Stoner     Disposition: remain on floor

## 2018-02-25 NOTE — SUBJECTIVE & OBJECTIVE
Past Medical History:   Diagnosis Date    ANNA (acute kidney injury) 2/22/2018    Anemia     Anxiety     BPH (benign prostatic hyperplasia)     Diverticulosis     Hypertension     Urinary retention        Past Surgical History:   Procedure Laterality Date    APPENDECTOMY      COLONOSCOPY      COLONOSCOPY N/A 2/1/2018    Procedure: COLONOSCOPY;  Surgeon: Richar Pyaan MD;  Location: [address];  Service: Endoscopy;  Laterality: N/A;  PM prep    EYE SURGERY Right     cataract extraction    FINGER SURGERY      FRACTURE SURGERY Right     index finger    TONSILLECTOMY         Review of patient's allergies indicates:   Allergen Reactions    Ciprofloxacin     Flagyl [metronidazole]     Lisinopril Other (See Comments)     cough    Mysoline [primidone]     Omnicef [cefdinir]     Shellfish containing products        Family History     Problem Relation (Age of Onset)    Heart disease Mother, Father    No Known Problems Sister, Brother    Stroke Mother        Social History Main Topics    Smoking status: Never Smoker    Smokeless tobacco: Never Used    Alcohol use Yes      Comment: none recently    Drug use: No    Sexual activity: Not Currently     Partners: Female      Review of Systems   Constitutional: Negative.    HENT: Negative.    Eyes: Negative.    Respiratory: Positive for cough, chest tightness and shortness of breath. Negative for wheezing.    Cardiovascular: Negative for chest pain, palpitations and leg swelling.   Gastrointestinal: Positive for abdominal pain. Negative for abdominal distention, anal bleeding, blood in stool, nausea and vomiting.        Abdominal musculature pain secondary to coughing   Endocrine: Negative.    Genitourinary:        Urinary retention requiring indwelling lackey   Musculoskeletal: Negative.    Skin: Negative.    Allergic/Immunologic: Negative.    Neurological: Negative.    Hematological: Negative.    Psychiatric/Behavioral: Negative.      Objective:      Vital Signs (Most Recent):  Temp: 99.4 °F (37.4 °C) (02/25/18 0558)  Pulse: (!) 116 (02/25/18 0558)  Resp: 20 (02/25/18 0558)  BP: (!) 150/67 (02/25/18 0558)  SpO2: (!) 85 % (02/25/18 0558) Vital Signs (24h Range):  Temp:  [98 °F (36.7 °C)-99.9 °F (37.7 °C)] 99.4 °F (37.4 °C)  Pulse:  [] 116  Resp:  [18-26] 20  SpO2:  [83 %-98 %] 85 %  BP: (132-179)/(66-86) 150/67   Weight: 74.4 kg (164 lb)  Body mass index is 24.21 kg/m².      Intake/Output Summary (Last 24 hours) at 02/25/18 0626  Last data filed at 02/25/18 0611   Gross per 24 hour   Intake              790 ml   Output             5450 ml   Net            -4660 ml       Physical Exam   Constitutional: He is oriented to person, place, and time. He appears well-developed and well-nourished. No distress.   HENT:   Head: Normocephalic and atraumatic.   Right Ear: External ear normal.   Left Ear: External ear normal.   Nose: Nose normal.   Mouth/Throat: Oropharynx is clear and moist.   Eyes: Conjunctivae and EOM are normal. Pupils are equal, round, and reactive to light.   Neck: Normal range of motion. Neck supple.   Cardiovascular: Regular rhythm, normal heart sounds and intact distal pulses.  Tachycardia present.    No murmur heard.  Pulmonary/Chest: Effort normal and breath sounds normal. No accessory muscle usage. No respiratory distress.   On comfort flow 30L 70%   Abdominal: Soft. Bowel sounds are normal. There is no tenderness.   Genitourinary:   Genitourinary Comments: Lainez draining clear yellow urine   Musculoskeletal: Normal range of motion.   Neurological: He is alert and oriented to person, place, and time.   Skin: Skin is warm and dry. Capillary refill takes less than 2 seconds. He is not diaphoretic.   Nursing note and vitals reviewed.      Vents:  Oxygen Concentration (%): 50 (02/25/18 0558)  Lines/Drains/Airways     Drain                 Urethral Catheter 02/15/18 16 Fr. 10 days          Peripheral Intravenous Line                 Peripheral  IV - Single Lumen 02/21/18 2120 Right Upper Arm 3 days              Significant Labs:    CBC/Anemia Profile:    Recent Labs  Lab 02/23/18  0722 02/23/18  1714 02/23/18  1816 02/24/18  0423 02/25/18  0226   WBC 6.43 6.26  --  9.74 11.61   HGB 7.9* 7.1*  --  8.4* 8.2*   HCT 24.5* 21.8*  --  25.8* 25.2*    149*  --  158 162   MCV 89 88  --  84 84   RDW 14.5 14.6*  --  18.6* 19.2*   [id number] 348  --   --   --   --    OCCULTBLOOD  --   --  Negative  --   --         Chemistries:    Recent Labs  Lab 02/24/18 0423 02/25/18 0226    134*   K 4.8 5.4*    106   CO2 19* 19*   BUN 22 23   CREATININE 1.6* 1.7*   CALCIUM 8.6* 8.9   ALBUMIN 2.6* 2.6*   PROT 6.3 6.5   BILITOT 1.5* 1.1*   ALKPHOS 78 79   ALT 26 35   AST 36 45*       All pertinent labs within the past 24 hours have been reviewed.    Significant Imaging: I have reviewed and interpreted all pertinent imaging results/findings within the past 24 hours.

## 2018-02-26 ENCOUNTER — ANESTHESIA EVENT (OUTPATIENT)
Dept: MEDSURG UNIT | Facility: HOSPITAL | Age: 68
DRG: 871 | End: 2018-02-26
Payer: MEDICARE

## 2018-02-26 PROBLEM — I33.0 MITRAL VALVE VEGETATION: Status: ACTIVE | Noted: 2018-02-26

## 2018-02-26 LAB
ALBUMIN SERPL BCP-MCNC: 2.3 G/DL
ALBUMIN SERPL BCP-MCNC: 2.4 G/DL
ALBUMIN SERPL BCP-MCNC: 2.4 G/DL
ALBUMIN SERPL ELPH-MCNC: 2.92 G/DL
ALP SERPL-CCNC: 76 U/L
ALPHA1 GLOB SERPL ELPH-MCNC: 0.6 G/DL
ALPHA2 GLOB SERPL ELPH-MCNC: 0.64 G/DL
ALT SERPL W/O P-5'-P-CCNC: 32 U/L
ANA SER QL IF: NORMAL
ANION GAP SERPL CALC-SCNC: 10 MMOL/L
ANION GAP SERPL CALC-SCNC: 12 MMOL/L
ANION GAP SERPL CALC-SCNC: 13 MMOL/L
AST SERPL-CCNC: 41 U/L
B-GLOBULIN SERPL ELPH-MCNC: 0.69 G/DL
BASOPHILS # BLD AUTO: 0.02 K/UL
BASOPHILS NFR BLD: 0.2 %
BILIRUB SERPL-MCNC: 1.5 MG/DL
BUN SERPL-MCNC: 24 MG/DL
BUN SERPL-MCNC: 27 MG/DL
BUN SERPL-MCNC: 28 MG/DL
CALCIUM SERPL-MCNC: 9.1 MG/DL
CALCIUM SERPL-MCNC: 9.1 MG/DL
CALCIUM SERPL-MCNC: 9.3 MG/DL
CHLORIDE SERPL-SCNC: 106 MMOL/L
CHLORIDE SERPL-SCNC: 107 MMOL/L
CHLORIDE SERPL-SCNC: 108 MMOL/L
CO2 SERPL-SCNC: 18 MMOL/L
CO2 SERPL-SCNC: 20 MMOL/L
CO2 SERPL-SCNC: 21 MMOL/L
CREAT SERPL-MCNC: 1.3 MG/DL
CREAT SERPL-MCNC: 1.4 MG/DL
CREAT SERPL-MCNC: 1.4 MG/DL
DIFFERENTIAL METHOD: ABNORMAL
EOSINOPHIL # BLD AUTO: 0.1 K/UL
EOSINOPHIL NFR BLD: 1.2 %
ERYTHROCYTE [DISTWIDTH] IN BLOOD BY AUTOMATED COUNT: 19.4 %
EST. GFR  (AFRICAN AMERICAN): 59.7 ML/MIN/1.73 M^2
EST. GFR  (AFRICAN AMERICAN): 59.7 ML/MIN/1.73 M^2
EST. GFR  (AFRICAN AMERICAN): >60 ML/MIN/1.73 M^2
EST. GFR  (NON AFRICAN AMERICAN): 51.6 ML/MIN/1.73 M^2
EST. GFR  (NON AFRICAN AMERICAN): 51.6 ML/MIN/1.73 M^2
EST. GFR  (NON AFRICAN AMERICAN): 56.5 ML/MIN/1.73 M^2
GAMMA GLOB SERPL ELPH-MCNC: 1.35 G/DL
GLUCOSE SERPL-MCNC: 108 MG/DL
GLUCOSE SERPL-MCNC: 130 MG/DL
GLUCOSE SERPL-MCNC: 96 MG/DL
HCT VFR BLD AUTO: 25.1 %
HGB BLD-MCNC: 8.3 G/DL
IMM GRANULOCYTES # BLD AUTO: 0.05 K/UL
IMM GRANULOCYTES NFR BLD AUTO: 0.5 %
INTERPRETATION SERPL IFE-IMP: NORMAL
KAPPA LC SER QL IA: 8.22 MG/DL
KAPPA LC/LAMBDA SER IA: 1.5
LAMBDA LC SER QL IA: 5.47 MG/DL
LYMPHOCYTES # BLD AUTO: 0.7 K/UL
LYMPHOCYTES NFR BLD: 6.8 %
MAGNESIUM SERPL-MCNC: 1.8 MG/DL
MCH RBC QN AUTO: 27.5 PG
MCHC RBC AUTO-ENTMCNC: 33.1 G/DL
MCV RBC AUTO: 83 FL
MONOCYTES # BLD AUTO: 0.7 K/UL
MONOCYTES NFR BLD: 6.6 %
NEUTROPHILS # BLD AUTO: 8.8 K/UL
NEUTROPHILS NFR BLD: 84.7 %
NRBC BLD-RTO: 0 /100 WBC
PATHOLOGIST INTERPRETATION SPE: NORMAL
PHOSPHATE SERPL-MCNC: 4.6 MG/DL
PHOSPHATE SERPL-MCNC: 4.7 MG/DL
PHOSPHATE SERPL-MCNC: 4.8 MG/DL
PLATELET # BLD AUTO: 166 K/UL
PMV BLD AUTO: 9.7 FL
POTASSIUM SERPL-SCNC: 4.4 MMOL/L
POTASSIUM SERPL-SCNC: 5 MMOL/L
POTASSIUM SERPL-SCNC: 5.4 MMOL/L
PROT SERPL-MCNC: 6.2 G/DL
PROT SERPL-MCNC: 6.7 G/DL
RBC # BLD AUTO: 3.02 M/UL
SODIUM SERPL-SCNC: 137 MMOL/L
SODIUM SERPL-SCNC: 139 MMOL/L
SODIUM SERPL-SCNC: 139 MMOL/L
VANCOMYCIN SERPL-MCNC: 30.9 UG/ML
WBC # BLD AUTO: 10.32 K/UL

## 2018-02-26 PROCEDURE — 25000003 PHARM REV CODE 250: Performed by: HOSPITALIST

## 2018-02-26 PROCEDURE — 80053 COMPREHEN METABOLIC PANEL: CPT

## 2018-02-26 PROCEDURE — 25000003 PHARM REV CODE 250: Performed by: NURSE PRACTITIONER

## 2018-02-26 PROCEDURE — 27100092 HC HIGH FLOW DELIVERY CANNULA

## 2018-02-26 PROCEDURE — 99291 CRITICAL CARE FIRST HOUR: CPT | Mod: ,,, | Performed by: NURSE PRACTITIONER

## 2018-02-26 PROCEDURE — 83735 ASSAY OF MAGNESIUM: CPT

## 2018-02-26 PROCEDURE — 94640 AIRWAY INHALATION TREATMENT: CPT

## 2018-02-26 PROCEDURE — 25000242 PHARM REV CODE 250 ALT 637 W/ HCPCS: Performed by: NURSE PRACTITIONER

## 2018-02-26 PROCEDURE — 84100 ASSAY OF PHOSPHORUS: CPT

## 2018-02-26 PROCEDURE — 63600175 PHARM REV CODE 636 W HCPCS: Performed by: NURSE PRACTITIONER

## 2018-02-26 PROCEDURE — 80069 RENAL FUNCTION PANEL: CPT

## 2018-02-26 PROCEDURE — 51702 INSERT TEMP BLADDER CATH: CPT

## 2018-02-26 PROCEDURE — 85025 COMPLETE CBC W/AUTO DIFF WBC: CPT

## 2018-02-26 PROCEDURE — 25000242 PHARM REV CODE 250 ALT 637 W/ HCPCS: Performed by: PHYSICIAN ASSISTANT

## 2018-02-26 PROCEDURE — 99900035 HC TECH TIME PER 15 MIN (STAT)

## 2018-02-26 PROCEDURE — 80202 ASSAY OF VANCOMYCIN: CPT

## 2018-02-26 PROCEDURE — 63600175 PHARM REV CODE 636 W HCPCS: Performed by: HOSPITALIST

## 2018-02-26 PROCEDURE — 20000000 HC ICU ROOM

## 2018-02-26 PROCEDURE — 27100171 HC OXYGEN HIGH FLOW UP TO 24 HOURS

## 2018-02-26 PROCEDURE — 99223 1ST HOSP IP/OBS HIGH 75: CPT | Mod: ,,, | Performed by: INTERNAL MEDICINE

## 2018-02-26 PROCEDURE — 94668 MNPJ CHEST WALL SBSQ: CPT

## 2018-02-26 PROCEDURE — 94664 DEMO&/EVAL PT USE INHALER: CPT

## 2018-02-26 RX ORDER — FUROSEMIDE 10 MG/ML
40 INJECTION INTRAMUSCULAR; INTRAVENOUS ONCE
Status: COMPLETED | OUTPATIENT
Start: 2018-02-26 | End: 2018-02-26

## 2018-02-26 RX ORDER — GUAIFENESIN/DEXTROMETHORPHAN 100-10MG/5
5 SYRUP ORAL EVERY 6 HOURS PRN
Status: DISCONTINUED | OUTPATIENT
Start: 2018-02-26 | End: 2018-03-07 | Stop reason: HOSPADM

## 2018-02-26 RX ORDER — ENOXAPARIN SODIUM 100 MG/ML
40 INJECTION SUBCUTANEOUS EVERY 24 HOURS
Status: DISCONTINUED | OUTPATIENT
Start: 2018-02-26 | End: 2018-03-05

## 2018-02-26 RX ORDER — ENOXAPARIN SODIUM 100 MG/ML
40 INJECTION SUBCUTANEOUS EVERY 24 HOURS
Status: CANCELLED | OUTPATIENT
Start: 2018-02-26

## 2018-02-26 RX ADMIN — AMLODIPINE BESYLATE 10 MG: 10 TABLET ORAL at 08:02

## 2018-02-26 RX ADMIN — FLUTICASONE PROPIONATE 50 MCG: 50 SPRAY, METERED NASAL at 08:02

## 2018-02-26 RX ADMIN — IPRATROPIUM BROMIDE AND ALBUTEROL SULFATE 3 ML: .5; 3 SOLUTION RESPIRATORY (INHALATION) at 07:02

## 2018-02-26 RX ADMIN — GUAIFENESIN AND DEXTROMETHORPHAN 5 ML: 100; 10 SYRUP ORAL at 06:02

## 2018-02-26 RX ADMIN — ACETAMINOPHEN 650 MG: 325 TABLET ORAL at 12:02

## 2018-02-26 RX ADMIN — FUROSEMIDE 40 MG: 10 INJECTION, SOLUTION INTRAMUSCULAR; INTRAVENOUS at 04:02

## 2018-02-26 RX ADMIN — TAMSULOSIN HYDROCHLORIDE 0.8 MG: 0.4 CAPSULE ORAL at 08:02

## 2018-02-26 RX ADMIN — ENOXAPARIN SODIUM 40 MG: 100 INJECTION SUBCUTANEOUS at 08:02

## 2018-02-26 RX ADMIN — ACETAMINOPHEN 650 MG: 325 TABLET ORAL at 10:02

## 2018-02-26 RX ADMIN — SODIUM CHLORIDE 3 G: 9 INJECTION, SOLUTION INTRAVENOUS at 08:02

## 2018-02-26 RX ADMIN — BENZONATATE 100 MG: 100 CAPSULE ORAL at 12:02

## 2018-02-26 RX ADMIN — IPRATROPIUM BROMIDE AND ALBUTEROL SULFATE 3 ML: .5; 3 SOLUTION RESPIRATORY (INHALATION) at 08:02

## 2018-02-26 RX ADMIN — SODIUM CHLORIDE 3 G: 9 INJECTION, SOLUTION INTRAVENOUS at 04:02

## 2018-02-26 RX ADMIN — IPRATROPIUM BROMIDE AND ALBUTEROL SULFATE 3 ML: .5; 3 SOLUTION RESPIRATORY (INHALATION) at 06:02

## 2018-02-26 RX ADMIN — SODIUM CHLORIDE 3 G: 9 INJECTION, SOLUTION INTRAVENOUS at 03:02

## 2018-02-26 RX ADMIN — AZITHROMYCIN 500 MG: 500 INJECTION, POWDER, LYOPHILIZED, FOR SOLUTION INTRAVENOUS at 08:02

## 2018-02-26 RX ADMIN — MIRTAZAPINE 30 MG: 15 TABLET, FILM COATED ORAL at 08:02

## 2018-02-26 RX ADMIN — IPRATROPIUM BROMIDE AND ALBUTEROL SULFATE 3 ML: .5; 3 SOLUTION RESPIRATORY (INHALATION) at 12:02

## 2018-02-26 RX ADMIN — SODIUM CHLORIDE 1000 MG: 9 INJECTION, SOLUTION INTRAVENOUS at 02:02

## 2018-02-26 NOTE — CONSULTS
"[facility]  Cardiology  Consult Note    Patient Name: Rodolfo Messina  MRN: [id number]  Admission Date: 2/21/2018  Hospital Length of Stay: 5 days  Code Status: Full Code   Attending Provider: Graham Meehan MD   Consulting Provider: Meera Thorpe MD  Primary Care Physician: Deric Claudio MD  Principal Problem:Acute hypoxemic respiratory failure    Patient information was obtained from patient and ER records.     Inpatient consult to Cardiology  Consult performed by: MEERA THORPE  Consult ordered by: ANETTE LYNNE        Subjective:     Chief Complaint:  Anemia      HPI:    67 y.o. male with HTN, BPH with recent urinary retention requiring indwelling lackey, anemia. He was sent to the ED by his PCP after he was found to have low Hb on routine labs.   Cardiology was consulted for abnormal mitral valve with a mobile density on recent TTE.      The patient was recently admitted 1/27/18 - 01/28/18 for symptomatic anemia associated with fatigue. He received one unit of PRBCs. Follow up EGD and colonoscopy on 02/01/18 with no clear source of bleeding.      The patient endorses a non-productive cough since December, with progression to green productive sputum x3 days and scant blood tinged today. Prior to this hospitalization the patient reports a few day history of fever/chills and malaise. He currently endorses a "grabbing" feeling in his chest when he inhales which is only relieved with coughing.       Past Medical History:   Diagnosis Date    ANNA (acute kidney injury) 2/22/2018    Anemia     Anxiety     BPH (benign prostatic hyperplasia)     Diverticulosis     Hypertension     Urinary retention        Past Surgical History:   Procedure Laterality Date    APPENDECTOMY      COLONOSCOPY      COLONOSCOPY N/A 2/1/2018    Procedure: COLONOSCOPY;  Surgeon: Richar Payan MD;  Location: [facility] ([address] [place] [address]);  Service: Endoscopy;  Laterality: N/A;  PM prep    EYE SURGERY Right     " cataract extraction    FINGER SURGERY      FRACTURE SURGERY Right     index finger    TONSILLECTOMY         Review of patient's allergies indicates:   Allergen Reactions    Ciprofloxacin     Flagyl [metronidazole]     Lisinopril Other (See Comments)     cough    Mysoline [primidone]     Omnicef [cefdinir]     Shellfish containing products        No current facility-administered medications on file prior to encounter.      Current Outpatient Prescriptions on File Prior to Encounter   Medication Sig    ALPRAZolam (XANAX) 0.25 MG tablet Take 1 tablet (0.25 mg total) by mouth nightly as needed for Insomnia.    amLODIPine (NORVASC) 10 MG tablet Take 1 tablet (10 mg total) by mouth once daily.    fluticasone (FLONASE) 50 mcg/actuation nasal spray 1 spray by Each Nare route 2 (two) times daily.    loratadine (CLARITIN) 10 mg tablet Take 10 mg by mouth once daily.    sulfamethoxazole-trimethoprim 800-160mg (BACTRIM DS) 800-160 mg Tab Take 1 tablet by mouth 2 (two) times daily.    tamsulosin (FLOMAX) 0.4 mg Cp24 Take 2 capsules (0.8 mg total) by mouth every evening.    VENTOLIN HFA 90 mcg/actuation inhaler     brompheniramine-pseudoeph-DM 2-30-10 mg/5 mL Syrp     CHERATUSSIN AC  mg/5 mL syrup Take 10 mLs by mouth every 6 (six) hours as needed.     mirtazapine (REMERON) 30 MG tablet TAKE 1 TABLET EVERY EVENING     Family History     Problem Relation (Age of Onset)    Heart disease Mother, Father    No Known Problems Sister, Brother    Stroke Mother        Social History Main Topics    Smoking status: Never Smoker    Smokeless tobacco: Never Used    Alcohol use Yes      Comment: none recently    Drug use: No    Sexual activity: Not Currently     Partners: Female     Review of Systems   Constitution: Negative for chills and fever.   Eyes: Positive for redness. Negative for photophobia.   Cardiovascular: Negative for chest pain, dyspnea on exertion and leg swelling.   Skin: Negative for rash.    Gastrointestinal: Negative for nausea and vomiting.   Genitourinary: Negative for dysuria and hematuria.        Urinary retention with indwelling lackey catheter.    Neurological: Positive for tremors (chronic ).   Psychiatric/Behavioral: Negative for altered mental status.     Objective:     Vital Signs (Most Recent):  Temp: 98.6 °F (37 °C) (02/26/18 1500)  Pulse: (!) 111 (02/26/18 1500)  Resp: (!) 29 (02/26/18 1500)  BP: 139/71 (02/26/18 1500)  SpO2: (!) 92 % (02/26/18 1500) Vital Signs (24h Range):  Temp:  [97.9 °F (36.6 °C)-99.2 °F (37.3 °C)] 98.6 °F (37 °C)  Pulse:  [109-124] 111  Resp:  [21-56] 29  SpO2:  [86 %-100 %] 92 %  BP: (127-162)/(58-79) 139/71     Weight: 76 kg (167 lb 8.8 oz)  Body mass index is 24.74 kg/m².    SpO2: (!) 92 %  O2 Device (Oxygen Therapy): Comfort Flow      Intake/Output Summary (Last 24 hours) at 02/26/18 1554  Last data filed at 02/26/18 1500   Gross per 24 hour   Intake             1250 ml   Output             2545 ml   Net            -1295 ml       Lines/Drains/Airways     Drain                 Urethral Catheter 02/15/18 16 Fr. 11 days          Peripheral Intravenous Line                 Peripheral IV - Single Lumen 02/21/18 2120 Right Upper Arm 4 days         Peripheral IV - Single Lumen 02/25/18 0827 Left Antecubital 1 day                Physical Exam   Constitutional: He is oriented to person, place, and time. He appears well-developed and well-nourished. He appears ill.   HENT:   Head: Normocephalic and atraumatic.   Eyes: EOM are normal. No scleral icterus.   Right eye conjunctival redness.    Neck: Neck supple.   Cardiovascular: Regular rhythm and intact distal pulses.    Murmur (systolic at the mitral area. ) heard.  Pulmonary/Chest: He is in respiratory distress. He has no wheezes. He has no rales.   On 20% HFNC.    Musculoskeletal: He exhibits no edema.   Neurological: He is alert and oriented to person, place, and time.   Skin: Skin is warm and dry. He is not  diaphoretic. There is pallor.   No splinter hemorrhage or clubbing.    Psychiatric: He has a normal mood and affect.       Significant Labs:   CMP   Recent Labs  Lab 02/25/18 0226 02/25/18 2015 02/26/18 0315 02/26/18  1112   *  < > 137 139 137   K 5.4*  < > 4.9 5.4* 5.0     < > 108 108 106   CO2 19*  < > 19* 18* 21*   *  < > 94 96 130*   BUN 23  < > 22 24* 27*   CREATININE 1.7*  < > 1.4 1.4 1.3   CALCIUM 8.9  < > 8.9 9.1 9.1   PROT 6.5  --   --  6.7  --    ALBUMIN 2.6*  < > 2.4* 2.4* 2.3*   BILITOT 1.1*  --   --  1.5*  --    ALKPHOS 79  --   --  76  --    AST 45*  --   --  41*  --    ALT 35  --   --  32  --    ANIONGAP 9  < > 10 13 10   ESTGFRAFRICA 47.2*  < > 59.7* 59.7* >60.0   EGFRNONAA 40.8*  < > 51.6* 51.6* 56.5*   < > = values in this interval not displayed. and CBC   Recent Labs  Lab 02/25/18 0226 02/26/18 0315   WBC 11.61 10.32   HGB 8.2* 8.3*   HCT 25.2* 25.1*    166       Significant Imaging: Echocardiogram:   2D echo with color flow doppler:   Results for orders placed or performed during the hospital encounter of 02/21/18   2D echo with color flow doppler   Result Value Ref Range    EF 68 55 - 65    Mitral Valve Regurgitation MODERATE (A)     Est. PA Systolic Pressure 51.56 (A)     Mitral Valve Mobility SYSTOLIC FLATTENING     Tricuspid Valve Regurgitation MILD      Assessment and Plan:     Mitral valve vegetation    Patient is presenting with recurrent anemia. During his hospitalization he received 2 units of PRBCs with appropriate response. He developed hypoxemic respiratory failure on 2/25 and was transferred to MICU. CT chest with multiple pulmonary nodules. CXR with bilateral infiltrates. Echo with mitral regurgitation and mobile density. Blood cx from 2/22 are NGT. Patient is on broad spectrum Abx (Vancomycin, Unasyn, and Azithromycin).   Cardiology is consulted for a concern of endocarditis and need for SOWMYA.     - Findings on Echo are less likely to be infective  endocarditis vegetations as the patient has no fevers and negative blood cultures.     Recommendations:   - Plan for SOWMYA sometime later this week if his oxygenation improves as he is high risk for intubation while requiring 15 L O2 HFNC.   - Agree with broad spectrum Abx per primary team.               VTE Risk Mitigation         Ordered     Medium Risk of VTE  Once      02/22/18 0611     Place sequential compression device  Until discontinued      02/22/18 0343     Place MOHAN hose  Until discontinued      02/22/18 0343          Thank you for your consult. I will follow-up with patient. Please contact us if you have any additional questions.   - Case was discussed with cardiology attending Dr. Salmon.     Meera Mathew MD  Cardiology   [facility]

## 2018-02-26 NOTE — ASSESSMENT & PLAN NOTE
Patient is presenting with recurrent anemia. During his hospitalization he received 2 units of PRBCs with appropriate response. He developed hypoxemic respiratory failure on 2/25 and was transferred to MICU. CT chest with multiple pulmonary nodules. CXR with bilateral infiltrates. Echo with mitral regurgitation and mobile density. Blood cx from 2/22 are NGT. Patient is on broad spectrum Abx (Vancomycin, Unasyn, and Azithromycin).   Cardiology is consulted for a concern of endocarditis and need for SOWMYA.     - Findings on Echo are less likely to be infective endocarditis vegetations as the patient has no fevers and negative blood cultures.     Recommendations:   - Plan for SOWMYA sometime later this week if his oxygenation improves as he is high risk for intubation while requiring 15 L O2 HFNC.   - Agree with broad spectrum Abx per primary team.

## 2018-02-26 NOTE — SUBJECTIVE & OBJECTIVE
Interval History/Significant Events: No acute events overnight. Remains on comfort flow.  Coughing improved.     Review of Systems   Constitutional: Negative for chills and fever.   HENT: Negative for sore throat.    Eyes: Negative for pain and visual disturbance.   Respiratory: Positive for cough. Negative for shortness of breath.    Cardiovascular: Negative for chest pain, palpitations and leg swelling.   Gastrointestinal: Positive for abdominal pain. Negative for abdominal distention, constipation, diarrhea, nausea and vomiting.   Genitourinary: Negative for hematuria.   Skin: Negative for rash and wound.   Neurological: Negative for dizziness, weakness and headaches.     Objective:     Vital Signs (Most Recent):  Temp: 98.9 °F (37.2 °C) (02/26/18 0300)  Pulse: (!) 111 (02/26/18 0725)  Resp: (!) 31 (02/26/18 0725)  BP: 133/77 (02/26/18 0500)  SpO2: 95 % (02/26/18 0725) Vital Signs (24h Range):  Temp:  [97.9 °F (36.6 °C)-99.5 °F (37.5 °C)] 98.9 °F (37.2 °C)  Pulse:  [109-124] 111  Resp:  [21-56] 31  SpO2:  [86 %-100 %] 95 %  BP: (129-162)/(58-82) 133/77   Weight: 76 kg (167 lb 8.8 oz)  Body mass index is 24.74 kg/m².      Intake/Output Summary (Last 24 hours) at 02/26/18 0902  Last data filed at 02/26/18 0500   Gross per 24 hour   Intake             1250 ml   Output             2370 ml   Net            -1120 ml       Physical Exam   Constitutional: No distress.   HENT:   Head: Normocephalic.   Nose: No rhinorrhea. No epistaxis.   Mouth/Throat: Oropharynx is clear and moist and mucous membranes are normal.   Eyes: EOM are normal. Pupils are equal, round, and reactive to light. Right conjunctiva has a hemorrhage. Right eye exhibits no nystagmus. Left eye exhibits no nystagmus.   Neck: No JVD present.   Cardiovascular: Normal rate, regular rhythm, normal heart sounds and normal pulses.    No murmur heard.  Warm, no peripheral edema   Pulmonary/Chest: Tachypnea noted. He has no decreased breath sounds. He has no  wheezes. He has no rhonchi. He has rales.   Abdominal: Soft. Bowel sounds are normal. He exhibits no distension. There is no tenderness.   Lymphadenopathy:     He has no cervical adenopathy.   Neurological: He is alert. No cranial nerve deficit or sensory deficit. GCS eye subscore is 4. GCS verbal subscore is 5. GCS motor subscore is 6.   Skin: Skin is warm and dry. No rash noted.   Nursing note and vitals reviewed.      Vents:  Oxygen Concentration (%): 60 (02/26/18 0725)  Lines/Drains/Airways     Drain                 Urethral Catheter 02/15/18 16 Fr. 11 days          Peripheral Intravenous Line                 Peripheral IV - Single Lumen 02/21/18 2120 Right Upper Arm 4 days         Peripheral IV - Single Lumen 02/25/18 0827 Left Antecubital 1 day              Significant Labs:    CBC/Anemia Profile:    Recent Labs  Lab 02/25/18 0226 02/26/18 0315   WBC 11.61 10.32   HGB 8.2* 8.3*   HCT 25.2* 25.1*    166   MCV 84 83   RDW 19.2* 19.4*        Chemistries:    Recent Labs  Lab 02/25/18 0226 02/25/18  0846 02/25/18 2015 02/26/18  0315   * 133* 137 139   K 5.4* 5.2* 4.9 5.4*    106 108 108   CO2 19* 19* 19* 18*   BUN 23 22 22 24*   CREATININE 1.7* 1.6* 1.4 1.4   CALCIUM 8.9 8.8 8.9 9.1   ALBUMIN 2.6* 2.5* 2.4* 2.4*   PROT 6.5  --   --  6.7   BILITOT 1.1*  --   --  1.5*   ALKPHOS 79  --   --  76   ALT 35  --   --  32   AST 45*  --   --  41*   MG  --  1.9  --  1.8   PHOS  --  3.7  3.7 4.2 4.6*       All pertinent labs within the past 24 hours have been reviewed.    Significant Imaging:  I have reviewed and interpreted all pertinent imaging results/findings within the past 24 hours.

## 2018-02-26 NOTE — HOSPITAL COURSE
Mr. Messina was admitted to the ICU with acute hypoxemic respiratory failure requiring comfort flow.  Antibiotics were broadened to (vancomycin + unasyn + azithromycin). He was also given lasix for diuresis with good response.  Oxygen requirements are slowing decreasing and creatinine is improving. Vasculitis workup pending given concern for DAH with worsening anemia without signs of acute blood loss other than scant hemoptysis. 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 CTS and ID consulted. One blood culture from 2/25 resulted with GPC's.

## 2018-02-26 NOTE — SUBJECTIVE & OBJECTIVE
Past Medical History:   Diagnosis Date    ANNA (acute kidney injury) 2/22/2018    Anemia     Anxiety     BPH (benign prostatic hyperplasia)     Diverticulosis     Hypertension     Urinary retention        Past Surgical History:   Procedure Laterality Date    APPENDECTOMY      COLONOSCOPY      COLONOSCOPY N/A 2/1/2018    Procedure: COLONOSCOPY;  Surgeon: Richar Payan MD;  Location: [address]);  Service: Endoscopy;  Laterality: N/A;  PM prep    EYE SURGERY Right     cataract extraction    FINGER SURGERY      FRACTURE SURGERY Right     index finger    TONSILLECTOMY         Review of patient's allergies indicates:   Allergen Reactions    Ciprofloxacin     Flagyl [metronidazole]     Lisinopril Other (See Comments)     cough    Mysoline [primidone]     Omnicef [cefdinir]     Shellfish containing products        No current facility-administered medications on file prior to encounter.      Current Outpatient Prescriptions on File Prior to Encounter   Medication Sig    ALPRAZolam (XANAX) 0.25 MG tablet Take 1 tablet (0.25 mg total) by mouth nightly as needed for Insomnia.    amLODIPine (NORVASC) 10 MG tablet Take 1 tablet (10 mg total) by mouth once daily.    fluticasone (FLONASE) 50 mcg/actuation nasal spray 1 spray by Each Nare route 2 (two) times daily.    loratadine (CLARITIN) 10 mg tablet Take 10 mg by mouth once daily.    sulfamethoxazole-trimethoprim 800-160mg (BACTRIM DS) 800-160 mg Tab Take 1 tablet by mouth 2 (two) times daily.    tamsulosin (FLOMAX) 0.4 mg Cp24 Take 2 capsules (0.8 mg total) by mouth every evening.    VENTOLIN HFA 90 mcg/actuation inhaler     brompheniramine-pseudoeph-DM 2-30-10 mg/5 mL Syrp     CHERATUSSIN AC  mg/5 mL syrup Take 10 mLs by mouth every 6 (six) hours as needed.     mirtazapine (REMERON) 30 MG tablet TAKE 1 TABLET EVERY EVENING     Family History     Problem Relation (Age of Onset)    Heart disease Mother, Father    No Known Problems  Sister, Brother    Stroke Mother        Social History Main Topics    Smoking status: Never Smoker    Smokeless tobacco: Never Used    Alcohol use Yes      Comment: none recently    Drug use: No    Sexual activity: Not Currently     Partners: Female     Review of Systems   Constitution: Negative for chills and fever.   Eyes: Positive for redness. Negative for photophobia.   Cardiovascular: Negative for chest pain, dyspnea on exertion and leg swelling.   Skin: Negative for rash.   Gastrointestinal: Negative for nausea and vomiting.   Genitourinary: Negative for dysuria and hematuria.        Urinary retention with indwelling lackey catheter.    Neurological: Positive for tremors (chronic ).   Psychiatric/Behavioral: Negative for altered mental status.     Objective:     Vital Signs (Most Recent):  Temp: 98.6 °F (37 °C) (02/26/18 1500)  Pulse: (!) 111 (02/26/18 1500)  Resp: (!) 29 (02/26/18 1500)  BP: 139/71 (02/26/18 1500)  SpO2: (!) 92 % (02/26/18 1500) Vital Signs (24h Range):  Temp:  [97.9 °F (36.6 °C)-99.2 °F (37.3 °C)] 98.6 °F (37 °C)  Pulse:  [109-124] 111  Resp:  [21-56] 29  SpO2:  [86 %-100 %] 92 %  BP: (127-162)/(58-79) 139/71     Weight: 76 kg (167 lb 8.8 oz)  Body mass index is 24.74 kg/m².    SpO2: (!) 92 %  O2 Device (Oxygen Therapy): Comfort Flow      Intake/Output Summary (Last 24 hours) at 02/26/18 1554  Last data filed at 02/26/18 1500   Gross per 24 hour   Intake             1250 ml   Output             2545 ml   Net            -1295 ml       Lines/Drains/Airways     Drain                 Urethral Catheter 02/15/18 16 Fr. 11 days          Peripheral Intravenous Line                 Peripheral IV - Single Lumen 02/21/18 2120 Right Upper Arm 4 days         Peripheral IV - Single Lumen 02/25/18 0827 Left Antecubital 1 day                Physical Exam   Constitutional: He is oriented to person, place, and time. He appears well-developed and well-nourished. He appears ill.   HENT:   Head:  Normocephalic and atraumatic.   Eyes: EOM are normal. No scleral icterus.   Right eye conjunctival redness.    Neck: Neck supple.   Cardiovascular: Regular rhythm and intact distal pulses.    Murmur (systolic at the mitral area. ) heard.  Pulmonary/Chest: He is in respiratory distress. He has no wheezes. He has no rales.   On 20% HFNC.    Musculoskeletal: He exhibits no edema.   Neurological: He is alert and oriented to person, place, and time.   Skin: Skin is warm and dry. He is not diaphoretic. There is pallor.   No splinter hemorrhage or clubbing.    Psychiatric: He has a normal mood and affect.       Significant Labs:   CMP   Recent Labs  Lab 02/25/18 0226 02/25/18 2015 02/26/18 0315 02/26/18  1112   *  < > 137 139 137   K 5.4*  < > 4.9 5.4* 5.0     < > 108 108 106   CO2 19*  < > 19* 18* 21*   *  < > 94 96 130*   BUN 23  < > 22 24* 27*   CREATININE 1.7*  < > 1.4 1.4 1.3   CALCIUM 8.9  < > 8.9 9.1 9.1   PROT 6.5  --   --  6.7  --    ALBUMIN 2.6*  < > 2.4* 2.4* 2.3*   BILITOT 1.1*  --   --  1.5*  --    ALKPHOS 79  --   --  76  --    AST 45*  --   --  41*  --    ALT 35  --   --  32  --    ANIONGAP 9  < > 10 13 10   ESTGFRAFRICA 47.2*  < > 59.7* 59.7* >60.0   EGFRNONAA 40.8*  < > 51.6* 51.6* 56.5*   < > = values in this interval not displayed. and CBC   Recent Labs  Lab 02/25/18 0226 02/26/18 0315   WBC 11.61 10.32   HGB 8.2* 8.3*   HCT 25.2* 25.1*    166       Significant Imaging: Echocardiogram:   2D echo with color flow doppler:   Results for orders placed or performed during the hospital encounter of 02/21/18   2D echo with color flow doppler   Result Value Ref Range    EF 68 55 - 65    Mitral Valve Regurgitation MODERATE (A)     Est. PA Systolic Pressure 51.56 (A)     Mitral Valve Mobility SYSTOLIC FLATTENING     Tricuspid Valve Regurgitation MILD

## 2018-02-26 NOTE — ANESTHESIA PREPROCEDURE EVALUATION
02/26/2018  [facility]  Anesthesia Pre-Operative Evaluation         Patient Name: Rodolfo Messina  YOB: 1950  MRN: [id number]    SUBJECTIVE:     Pre-operative evaluation for Procedure(s) (LRB):  TRANSESOPHAGEAL ECHOCARDIOGRAM (SOWMYA) (N/A)     02/26/2018    Rodolfo Messina is a 67 y.o. male w/ a significant PMHx of HTN, anemia, mitral valve vegetation, PVCs, acute hypoxemic respiratory failure, ANNA, anxiety who presents for the above procedure.    LDA:   Right upper arm 18g PIV  Left antecubital 20g PIV  lackey    Prev airway: None documented.    Drips: None documented.    Patient Active Problem List   Diagnosis    Tremor    HTN (hypertension)    PVC (premature ventricular contraction)    Anxiety    Generalized abdominal pain    Acute right-sided low back pain without sciatica    Anemia    Colon cancer screening    Symptomatic anemia    ANNA (acute kidney injury)    Chronic cough    Pulmonary nodules    Mild malnutrition    Asymptomatic bacteriuria    Acute hypoxemic respiratory failure    Shortness of breath    Mitral valve vegetation       Review of patient's allergies indicates:   Allergen Reactions    Ciprofloxacin     Flagyl [metronidazole]     Lisinopril Other (See Comments)     cough    Mysoline [primidone]     Omnicef [cefdinir]     Shellfish containing products        Current Inpatient Medications:   albuterol-ipratropium 2.5mg-0.5mg/3mL  3 mL Nebulization Q6H    amLODIPine  10 mg Oral Daily    ampicillin-sulbactim (UNASYN) IVPB  3 g Intravenous Q6H    azithromycin  500 mg Intravenous Q24H    fluticasone  1 spray Each Nare BID    mirtazapine  30 mg Oral QHS    tamsulosin  0.8 mg Oral QHS    vancomycin (VANCOCIN) IVPB  15 mg/kg Intravenous Q24H       No current facility-administered medications on file prior to encounter.      Current  Outpatient Prescriptions on File Prior to Encounter   Medication Sig Dispense Refill    ALPRAZolam (XANAX) 0.25 MG tablet Take 1 tablet (0.25 mg total) by mouth nightly as needed for Insomnia. 30 tablet 0    amLODIPine (NORVASC) 10 MG tablet Take 1 tablet (10 mg total) by mouth once daily. 30 tablet 1    fluticasone (FLONASE) 50 mcg/actuation nasal spray 1 spray by Each Nare route 2 (two) times daily. 1 Bottle 11    loratadine (CLARITIN) 10 mg tablet Take 10 mg by mouth once daily.      sulfamethoxazole-trimethoprim 800-160mg (BACTRIM DS) 800-160 mg Tab Take 1 tablet by mouth 2 (two) times daily. 14 tablet 0    tamsulosin (FLOMAX) 0.4 mg Cp24 Take 2 capsules (0.8 mg total) by mouth every evening. 180 capsule 3    VENTOLIN HFA 90 mcg/actuation inhaler       brompheniramine-pseudoeph-DM 2-30-10 mg/5 mL Syrp       CHERATUSSIN AC  mg/5 mL syrup Take 10 mLs by mouth every 6 (six) hours as needed.       mirtazapine (REMERON) 30 MG tablet TAKE 1 TABLET EVERY EVENING 90 tablet 3       Past Surgical History:   Procedure Laterality Date    APPENDECTOMY      COLONOSCOPY      COLONOSCOPY N/A 2/1/2018    Procedure: COLONOSCOPY;  Surgeon: Ricahr Payan MD;  Location: [address];  Service: Endoscopy;  Laterality: N/A;  PM prep    EYE SURGERY Right     cataract extraction    FINGER SURGERY      FRACTURE SURGERY Right     index finger    TONSILLECTOMY         Social History     Social History    Marital status:      Spouse name: N/A    Number of children: N/A    Years of education: N/A     Occupational History     [employer]     Social History Main Topics    Smoking status: Never Smoker    Smokeless tobacco: Never Used    Alcohol use Yes      Comment: none recently    Drug use: No    Sexual activity: Not Currently     Partners: Female     Other Topics Concern    Not on file     Social History Narrative    No narrative on file       OBJECTIVE:     Vital Signs Range (Last 24H):  Temp:   [36.6 °C (97.9 °F)-37.3 °C (99.2 °F)]   Pulse:  [109-124]   Resp:  [21-56]   BP: (127-162)/(58-79)   SpO2:  [86 %-100 %]       CBC:   Recent Labs      02/25/18 0226 02/26/18 0315   WBC  11.61  10.32   RBC  3.01*  3.02*   HGB  8.2*  8.3*   HCT  25.2*  25.1*   PLT  162  166   MCV  84  83   MCH  27.2  27.5   MCHC  32.5  33.1       CMP:   Recent Labs      02/25/18 0226 02/25/18   0846   02/26/18 0315 02/26/18   1112   NA  134*  133*   < >  139  137   K  5.4*  5.2*   < >  5.4*  5.0   CL  106  106   < >  108  106   CO2  19*  19*   < >  18*  21*   BUN  23  22   < >  24*  27*   CREATININE  1.7*  1.6*   < >  1.4  1.3   GLU  133*  113*   < >  96  130*   MG   --   1.9   --   1.8   --    PHOS   --   3.7  3.7   < >  4.6*  4.8*   CALCIUM  8.9  8.8   < >  9.1  9.1   ALBUMIN  2.6*  2.5*   < >  2.4*  2.3*   PROT  6.5   --    --   6.7   --    ALKPHOS  79   --    --   76   --    ALT  35   --    --   32   --    AST  45*   --    --   41*   --    BILITOT  1.1*   --    --   1.5*   --     < > = values in this interval not displayed.       INR:  No results for input(s): PT, INR, PROTIME, APTT in the last 72 hours.    Diagnostic Studies: No relevant studies.    EKG:   Sinus tachycardia  Otherwise normal ECG  When compared with ECG of 21-FEB-2018 21:20,  No significant change was found  Confirmed by KAELYN MOYA MD ([id number]) on 2/24/2018 11:30:09 PM    2D ECHO:  Results for orders placed or performed during the hospital encounter of 02/21/18   2D echo with color flow doppler   Result Value Ref Range    EF 68 55 - 65    Mitral Valve Regurgitation MODERATE (A)     Est. PA Systolic Pressure 51.56 (A)     Mitral Valve Mobility SYSTOLIC FLATTENING     Tricuspid Valve Regurgitation MILD          ASSESSMENT/PLAN:       Anesthesia Evaluation    I have reviewed the Patient Summary Reports.     I have reviewed the Medications.     Review of Systems  Anesthesia Hx:  Denies Hx of Anesthetic complications  History of prior surgery of interest to  airway management or planning: Previous anesthesia: MAC Denies Family Hx of Anesthesia complications.   Denies Personal Hx of Anesthesia complications.   Hematology/Oncology:     Oncology Normal    -- Anemia:   EENT/Dental:EENT/Dental Normal   Cardiovascular:   Hypertension HAQ ECG has been reviewed.    Renal/:   Chronic Renal Disease, ARF    Hepatic/GI:  Hepatic/GI Normal  Denies GERD.    Musculoskeletal:  Musculoskeletal Normal    Neurological:  Neurology Normal Denies TIA.  Denies CVA. Denies Seizures.    Endocrine:   Denies Diabetes.    Dermatological:  Skin Normal    Psych:   anxiety          Physical Exam  General:  Well nourished    Airway/Jaw/Neck:  Airway Findings: Mouth Opening: Normal Tongue: Normal  General Airway Assessment: Adult  Mallampati: III  Improves to II with phonation.  TM Distance: Normal, at least 6 cm         Dental:  Dental Findings: In tact   Chest/Lungs:  Chest/Lungs Clear    Heart/Vascular:  Heart Findings: Normal       Mental Status:  Mental Status Findings:  Cooperative, Alert and Oriented         Anesthesia Plan  Type of Anesthesia, risks & benefits discussed:  Anesthesia Type:  general, MAC  Patient's Preference:   Intra-op Monitoring Plan: standard ASA monitors  Intra-op Monitoring Plan Comments:   Post Op Pain Control Plan: multimodal analgesia  Post Op Pain Control Plan Comments:   Induction:   IV  Beta Blocker:  Patient is not currently on a Beta-Blocker (No further documentation required).       Informed Consent: Patient understands risks and agrees with Anesthesia plan.  Questions answered. Anesthesia consent signed with patient.  ASA Score: 4     Day of Surgery Review of History & Physical:            Ready For Surgery From Anesthesia Perspective.

## 2018-02-26 NOTE — HOSPITAL COURSE
In the ED his hgb was 6.7, creatinine 2 (baseline ~ 1.1). UA was negative for infection. Stool was neg for occult blood.  The patient was admitted to Hospital Medicine for further management. CTA chest revealed multiple pulmonary nodules for which Pulmonology was consulted (patient has occupational asbestos exposure) > recommended adding duo nebs and outpatient follow up). The patient has received 2 units PRBCs during this hospitalization, the last being ~ 2am on 2/24. The patient developed hypoxemia overnight 2/23 into 2/24 (87% on RA) and required 2-3L NC. CXRY on 2/24 revealed interval development of bilateral patchy interstitial and parietal attenuation reflective of edema vs infection. The patient was given a x1 dose of lasix 20mg and maintenance IVF were discontinued. On the evening of 2/24 the patient developed worsening infiltrates and increasing oxygen requirements, now needing comfort flow 30L 80% to maintain oxygen levels. He was transferred to MICU due to worsening hypoxemic respiratory failure.

## 2018-02-26 NOTE — NURSING
Noted scleral hemmorhage on right eye. Pt said he was coughing and blew his eye. NP Ana made aware via phone. Will continue to monitor.

## 2018-02-26 NOTE — NURSING
Docusate sodium instilled on right ear as instructed and irrigated with NSS. No complications. Pt said hearing improves on right ear. Will cvontinue to monitor.

## 2018-02-26 NOTE — PLAN OF CARE
Problem: Patient Care Overview  Goal: Plan of Care Review  Outcome: Ongoing (interventions implemented as appropriate)  Pt oriented x4, pt still with resting tremors. Remains on comfort flow at 55%FIO2 and 30lpm., sat above 88%. HR still on 's to low 120's.On NPO except for meds  and ice chips. Remains on FC draining adequate UO. POC reviewed with pt. All concerns and questions addressed. Will continue to monitor.

## 2018-02-26 NOTE — PLAN OF CARE
Patient transferred to ICU for respiratory decompensation, now on HFNC 30L/50% FiO2 and intubation watch.  ID following.  Patient will require PT/OT reevaluation prior to d/c, prior recs for home no needs.  CM will continue to follow.       02/26/18 1407   Right Care Assessment   Can the patient answer the patient profile reliably? Yes, cognitively intact   How often would a person be available to care for the patient? Whenever needed   Describe the patient's ability to walk at the present time. Minor restrictions or changes   How does the patient rate their overall health at the present time? Fair   Number of comorbid conditions (as recorded on the chart) Two   During the past month, has the patient often been bothered by feeling down, depressed or hopeless? No   During the past month, has the patient often been bothered by little interest or pleasure in doing things? No   Stephanie Anne, RN, BSN  Case Management  [facility]  Ext. [phone]

## 2018-02-26 NOTE — HPI
" 67 y.o. male with HTN, BPH with recent urinary retention requiring indwelling lackey, anemia. He was sent to the ED by his PCP after he was found to have low Hb on routine labs.   Cardiology was consulted for abnormal mitral valve with a mobile density on recent TTE.      The patient was recently admitted 1/27/18 - 01/28/18 for symptomatic anemia associated with fatigue. He received one unit of PRBCs. Follow up EGD and colonoscopy on 02/01/18 with no clear source of bleeding.      The patient endorses a non-productive cough since December, with progression to green productive sputum x3 days and scant blood tinged today. Prior to this hospitalization the patient reports a few day history of fever/chills and malaise. He currently endorses a "grabbing" feeling in his chest when he inhales which is only relieved with coughing.     "

## 2018-02-26 NOTE — PLAN OF CARE
Problem: Patient Care Overview  Goal: Plan of Care Review  Outcome: Ongoing (interventions implemented as appropriate)  No acute events throughout day. See vital signs and assessments in flowsheets. See below for updates on today's progress.     Pulmonary: Comfort Flow NC 40% FiO2 15L Sats 93-96%    Cardiovascular: ST EKG, 100-110s    Neurological: AAOx4, right scleral hemorrhage    Gastrointestinal: +BS    Genitourinary: Lainez catheter draining yellow urine, 50 mL/hr    Integumentary/Other: BUE bruising    Patient progressing towards goals as tolerated, plan of care communicated and reviewed with Rodolfo Messina and family. All concerns addressed. Will continue to monitor.

## 2018-02-27 ENCOUNTER — ANESTHESIA (OUTPATIENT)
Dept: MEDSURG UNIT | Facility: HOSPITAL | Age: 68
DRG: 871 | End: 2018-02-27
Payer: MEDICARE

## 2018-02-27 ENCOUNTER — SURGERY (OUTPATIENT)
Age: 68
End: 2018-02-27

## 2018-02-27 PROBLEM — I05.9 PULMONARY HYPERTENSION DUE TO MITRAL VALVE DISEASE: Status: ACTIVE | Noted: 2018-02-27

## 2018-02-27 PROBLEM — E44.1 MILD MALNUTRITION: Status: RESOLVED | Noted: 2018-02-22 | Resolved: 2018-02-27

## 2018-02-27 PROBLEM — R06.02 SHORTNESS OF BREATH: Status: RESOLVED | Noted: 2018-02-24 | Resolved: 2018-02-27

## 2018-02-27 PROBLEM — I33.0 SUBACUTE BACTERIAL ENDOCARDITIS: Status: ACTIVE | Noted: 2018-02-27

## 2018-02-27 PROBLEM — I38 ENDOCARDITIS: Status: ACTIVE | Noted: 2018-02-27

## 2018-02-27 PROBLEM — I27.22 PULMONARY HYPERTENSION DUE TO MITRAL VALVE DISEASE: Status: ACTIVE | Noted: 2018-02-27

## 2018-02-27 PROBLEM — I50.1 PULMONARY EDEMA CARDIAC CAUSE: Status: ACTIVE | Noted: 2018-02-27

## 2018-02-27 PROBLEM — R82.71 ASYMPTOMATIC BACTERIURIA: Status: RESOLVED | Noted: 2018-02-24 | Resolved: 2018-02-27

## 2018-02-27 PROBLEM — I08.0 MITRAL AND AORTIC VALVE REGURGITATION: Status: ACTIVE | Noted: 2018-02-27

## 2018-02-27 PROBLEM — I35.1 SEVERE AORTIC VALVE REGURGITATION: Status: ACTIVE | Noted: 2018-02-27

## 2018-02-27 LAB
ALBUMIN SERPL BCP-MCNC: 2.3 G/DL
ALP SERPL-CCNC: 65 U/L
ALT SERPL W/O P-5'-P-CCNC: 29 U/L
ANCA AB TITR SER IF: NORMAL TITER
AST SERPL-CCNC: 31 U/L
BASOPHILS # BLD AUTO: 0.03 K/UL
BASOPHILS NFR BLD: 0.4 %
BILIRUB DIRECT SERPL-MCNC: 0.8 MG/DL
BILIRUB SERPL-MCNC: 1.4 MG/DL
BM IGG SER-ACNC: <0.2 U
DIFFERENTIAL METHOD: ABNORMAL
EOSINOPHIL # BLD AUTO: 0.3 K/UL
EOSINOPHIL NFR BLD: 4.3 %
ERYTHROCYTE [DISTWIDTH] IN BLOOD BY AUTOMATED COUNT: 19.4 %
HCT VFR BLD AUTO: 22.8 %
HGB BLD-MCNC: 7.4 G/DL
IMM GRANULOCYTES # BLD AUTO: 0.04 K/UL
IMM GRANULOCYTES NFR BLD AUTO: 0.5 %
LYMPHOCYTES # BLD AUTO: 0.7 K/UL
LYMPHOCYTES NFR BLD: 8.7 %
MAGNESIUM SERPL-MCNC: 2 MG/DL
MCH RBC QN AUTO: 27.3 PG
MCHC RBC AUTO-ENTMCNC: 32.5 G/DL
MCV RBC AUTO: 84 FL
MONOCYTES # BLD AUTO: 0.5 K/UL
MONOCYTES NFR BLD: 6.9 %
NEUTROPHILS # BLD AUTO: 6 K/UL
NEUTROPHILS NFR BLD: 79.2 %
NRBC BLD-RTO: 0 /100 WBC
P-ANCA TITR SER IF: NORMAL TITER
PATHOLOGIST INTERPRETATION IFE: NORMAL
PHOSPHATE SERPL-MCNC: 4.9 MG/DL
PLATELET # BLD AUTO: 177 K/UL
PMV BLD AUTO: 10.4 FL
PROT SERPL-MCNC: 6.3 G/DL
RBC # BLD AUTO: 2.71 M/UL
VANCOMYCIN SERPL-MCNC: 9.2 UG/ML
WBC # BLD AUTO: 7.63 K/UL

## 2018-02-27 PROCEDURE — 94761 N-INVAS EAR/PLS OXIMETRY MLT: CPT

## 2018-02-27 PROCEDURE — 87040 BLOOD CULTURE FOR BACTERIA: CPT

## 2018-02-27 PROCEDURE — 93325 DOPPLER ECHO COLOR FLOW MAPG: CPT | Mod: 26,,, | Performed by: INTERNAL MEDICINE

## 2018-02-27 PROCEDURE — 25000003 PHARM REV CODE 250: Performed by: HOSPITALIST

## 2018-02-27 PROCEDURE — 93325 DOPPLER ECHO COLOR FLOW MAPG: CPT

## 2018-02-27 PROCEDURE — B246ZZ4 ULTRASONOGRAPHY OF RIGHT AND LEFT HEART, TRANSESOPHAGEAL: ICD-10-PCS | Performed by: INTERNAL MEDICINE

## 2018-02-27 PROCEDURE — 25000003 PHARM REV CODE 250: Performed by: NURSE PRACTITIONER

## 2018-02-27 PROCEDURE — 27100092 HC HIGH FLOW DELIVERY CANNULA

## 2018-02-27 PROCEDURE — 25000242 PHARM REV CODE 250 ALT 637 W/ HCPCS: Performed by: NURSE PRACTITIONER

## 2018-02-27 PROCEDURE — 63600175 PHARM REV CODE 636 W HCPCS: Performed by: NURSE PRACTITIONER

## 2018-02-27 PROCEDURE — 80202 ASSAY OF VANCOMYCIN: CPT

## 2018-02-27 PROCEDURE — 99232 SBSQ HOSP IP/OBS MODERATE 35: CPT | Mod: ,,, | Performed by: INTERNAL MEDICINE

## 2018-02-27 PROCEDURE — 25000242 PHARM REV CODE 250 ALT 637 W/ HCPCS: Performed by: PHYSICIAN ASSISTANT

## 2018-02-27 PROCEDURE — 63600175 PHARM REV CODE 636 W HCPCS: Performed by: NURSE ANESTHETIST, CERTIFIED REGISTERED

## 2018-02-27 PROCEDURE — 93320 DOPPLER ECHO COMPLETE: CPT | Mod: 26,,, | Performed by: INTERNAL MEDICINE

## 2018-02-27 PROCEDURE — 37000009 HC ANESTHESIA EA ADD 15 MINS

## 2018-02-27 PROCEDURE — 80076 HEPATIC FUNCTION PANEL: CPT

## 2018-02-27 PROCEDURE — 99233 SBSQ HOSP IP/OBS HIGH 50: CPT | Mod: ,,, | Performed by: NURSE PRACTITIONER

## 2018-02-27 PROCEDURE — 94640 AIRWAY INHALATION TREATMENT: CPT

## 2018-02-27 PROCEDURE — 27000221 HC OXYGEN, UP TO 24 HOURS

## 2018-02-27 PROCEDURE — 99291 CRITICAL CARE FIRST HOUR: CPT | Mod: ,,, | Performed by: INTERNAL MEDICINE

## 2018-02-27 PROCEDURE — 37000008 HC ANESTHESIA 1ST 15 MINUTES

## 2018-02-27 PROCEDURE — 63600175 PHARM REV CODE 636 W HCPCS: Performed by: INTERNAL MEDICINE

## 2018-02-27 PROCEDURE — 25000003 PHARM REV CODE 250: Performed by: NURSE ANESTHETIST, CERTIFIED REGISTERED

## 2018-02-27 PROCEDURE — 83735 ASSAY OF MAGNESIUM: CPT

## 2018-02-27 PROCEDURE — 20000000 HC ICU ROOM

## 2018-02-27 PROCEDURE — 93312 ECHO TRANSESOPHAGEAL: CPT | Mod: 26,,, | Performed by: INTERNAL MEDICINE

## 2018-02-27 PROCEDURE — D9220A PRA ANESTHESIA: Mod: CRNA,,, | Performed by: NURSE ANESTHETIST, CERTIFIED REGISTERED

## 2018-02-27 PROCEDURE — D9220A PRA ANESTHESIA: Mod: ANES,,, | Performed by: ANESTHESIOLOGY

## 2018-02-27 PROCEDURE — 85025 COMPLETE CBC W/AUTO DIFF WBC: CPT

## 2018-02-27 PROCEDURE — 63600175 PHARM REV CODE 636 W HCPCS: Performed by: HOSPITALIST

## 2018-02-27 PROCEDURE — 84100 ASSAY OF PHOSPHORUS: CPT

## 2018-02-27 RX ORDER — PROPOFOL 10 MG/ML
VIAL (ML) INTRAVENOUS
Status: DISCONTINUED | OUTPATIENT
Start: 2018-02-27 | End: 2018-02-27

## 2018-02-27 RX ORDER — LIDOCAINE HYDROCHLORIDE 10 MG/ML
INJECTION INFILTRATION; PERINEURAL
Status: DISPENSED
Start: 2018-02-27 | End: 2018-02-28

## 2018-02-27 RX ORDER — FUROSEMIDE 10 MG/ML
40 INJECTION INTRAMUSCULAR; INTRAVENOUS ONCE
Status: COMPLETED | OUTPATIENT
Start: 2018-02-27 | End: 2018-02-27

## 2018-02-27 RX ORDER — FUROSEMIDE 10 MG/ML
40 INJECTION INTRAMUSCULAR; INTRAVENOUS EVERY 8 HOURS
Status: DISCONTINUED | OUTPATIENT
Start: 2018-02-27 | End: 2018-02-28

## 2018-02-27 RX ORDER — LIDOCAINE HYDROCHLORIDE 20 MG/ML
SOLUTION OROPHARYNGEAL
Status: DISCONTINUED | OUTPATIENT
Start: 2018-02-27 | End: 2018-02-27

## 2018-02-27 RX ORDER — TAMSULOSIN HYDROCHLORIDE 0.4 MG/1
0.4 CAPSULE ORAL NIGHTLY
Status: DISCONTINUED | OUTPATIENT
Start: 2018-02-28 | End: 2018-03-05

## 2018-02-27 RX ORDER — ETOMIDATE 2 MG/ML
INJECTION INTRAVENOUS
Status: DISCONTINUED | OUTPATIENT
Start: 2018-02-27 | End: 2018-02-27

## 2018-02-27 RX ORDER — LIDOCAINE HCL/PF 100 MG/5ML
SYRINGE (ML) INTRAVENOUS
Status: DISCONTINUED | OUTPATIENT
Start: 2018-02-27 | End: 2018-02-27

## 2018-02-27 RX ADMIN — PROPOFOL 50 MG: 10 INJECTION, EMULSION INTRAVENOUS at 03:02

## 2018-02-27 RX ADMIN — SODIUM CHLORIDE 3 G: 9 INJECTION, SOLUTION INTRAVENOUS at 08:02

## 2018-02-27 RX ADMIN — IPRATROPIUM BROMIDE AND ALBUTEROL SULFATE 3 ML: .5; 3 SOLUTION RESPIRATORY (INHALATION) at 11:02

## 2018-02-27 RX ADMIN — SODIUM CHLORIDE 1000 MG: 9 INJECTION, SOLUTION INTRAVENOUS at 03:02

## 2018-02-27 RX ADMIN — SODIUM CHLORIDE 3 G: 9 INJECTION, SOLUTION INTRAVENOUS at 04:02

## 2018-02-27 RX ADMIN — ETOMIDATE 3 MG: 2 INJECTION, SOLUTION INTRAVENOUS at 03:02

## 2018-02-27 RX ADMIN — FLUTICASONE PROPIONATE 50 MCG: 50 SPRAY, METERED NASAL at 08:02

## 2018-02-27 RX ADMIN — VANCOMYCIN HYDROCHLORIDE 500 MG: 500 INJECTION, POWDER, LYOPHILIZED, FOR SOLUTION INTRAVENOUS at 09:02

## 2018-02-27 RX ADMIN — FUROSEMIDE 40 MG: 10 INJECTION, SOLUTION INTRAMUSCULAR; INTRAVENOUS at 01:02

## 2018-02-27 RX ADMIN — ETOMIDATE 6 MG: 2 INJECTION, SOLUTION INTRAVENOUS at 02:02

## 2018-02-27 RX ADMIN — SODIUM CHLORIDE 3 G: 9 INJECTION, SOLUTION INTRAVENOUS at 02:02

## 2018-02-27 RX ADMIN — LIDOCAINE HYDROCHLORIDE 15 ML: 20 SOLUTION OROPHARYNGEAL at 02:02

## 2018-02-27 RX ADMIN — LIDOCAINE HYDROCHLORIDE 50 MG: 20 INJECTION, SOLUTION INTRAVENOUS at 02:02

## 2018-02-27 RX ADMIN — PROPOFOL 30 MG: 10 INJECTION, EMULSION INTRAVENOUS at 02:02

## 2018-02-27 RX ADMIN — MIRTAZAPINE 30 MG: 15 TABLET, FILM COATED ORAL at 08:02

## 2018-02-27 RX ADMIN — AZITHROMYCIN 500 MG: 500 INJECTION, POWDER, LYOPHILIZED, FOR SOLUTION INTRAVENOUS at 08:02

## 2018-02-27 RX ADMIN — OXYCODONE HYDROCHLORIDE 5 MG: 5 TABLET ORAL at 09:02

## 2018-02-27 RX ADMIN — IPRATROPIUM BROMIDE AND ALBUTEROL SULFATE 3 ML: .5; 3 SOLUTION RESPIRATORY (INHALATION) at 12:02

## 2018-02-27 RX ADMIN — IPRATROPIUM BROMIDE AND ALBUTEROL SULFATE 3 ML: .5; 3 SOLUTION RESPIRATORY (INHALATION) at 03:02

## 2018-02-27 RX ADMIN — FUROSEMIDE 40 MG: 10 INJECTION, SOLUTION INTRAMUSCULAR; INTRAVENOUS at 11:02

## 2018-02-27 RX ADMIN — AMLODIPINE BESYLATE 10 MG: 10 TABLET ORAL at 08:02

## 2018-02-27 RX ADMIN — TAMSULOSIN HYDROCHLORIDE 0.8 MG: 0.4 CAPSULE ORAL at 08:02

## 2018-02-27 RX ADMIN — IPRATROPIUM BROMIDE AND ALBUTEROL SULFATE 3 ML: .5; 3 SOLUTION RESPIRATORY (INHALATION) at 10:02

## 2018-02-27 RX ADMIN — PROPOFOL 40 MG: 10 INJECTION, EMULSION INTRAVENOUS at 03:02

## 2018-02-27 RX ADMIN — Medication 1 SPRAY: at 09:02

## 2018-02-27 RX ADMIN — ENOXAPARIN SODIUM 40 MG: 100 INJECTION SUBCUTANEOUS at 04:02

## 2018-02-27 NOTE — ASSESSMENT & PLAN NOTE
--no evidence of ongoing GIB, previous EGD and C scope without source of bleeding. Minimal amounts of hemoptysis currently.   --hemolytic labs negative. Inappropriately low reticulocytes  --s/p 2 units this admission (on 2/22 and 2/23). Continue to monitor and transfuse for Hgb <7

## 2018-02-27 NOTE — MEDICAL/APP STUDENT
"[facility]  Critical Care - Medicine  Progress Note    Patient Name: Rodolfo Messina  MRN: [id number]  Admission Date: 2/21/2018  Hospital Length of Stay: 6 days  Code Status: Full Code  Attending Provider: Graham Meehan MD  Primary Care Provider: Deric Claudio MD   Principal Problem: Acute hypoxemic respiratory failure    Subjective:     HPI:Mr. Messina is a 67 y.o. male with significant past medical history of HTN and essential tremor, recently with urinary retention requiring indwelling lackey presented to hospital complaining of low Hgb. The patient was recently admitted 1/27/18 - 01/28/18 for symptomatic anemia associated with fatigue. He received one unit blood transfusion with improved Hgb from 7 to 8.5. Follow up EGD and colonoscopy on 02/01/18 showed non bleeding erosive gastropathy, internal hemorrhoid and diverticulosis in sigmoid and descending colon. The patient had routine labs done by his PCP which revealed hgb 7.1. The patient's PCP called him and told him to report to the Emergency Department for further workup.      The patient endorses a non-productive cough since December, with progression to green productive sputum x3 days and scant blood tinged today. Prior to this hospitalization the patient reports a few day history of fever/chills and malaise. He currently endorses a "grabbing" feeling in his chest when he inhales which is only relieved with coughing as well as abdominal musculature pain associated with coughing.      Work up in the ED revealed a hgb of 6.7, creatinine 2 (baseline ~ 1.1). UA was negative for infection. Stool was neg for occult blood.  The patient was admitted to Hospital Medicine for further management. CTA chest revealed multiple pulmonary nodules for which Pulmonology was consulted (patient has occupational asbestos exposure > recommended adding duo nebs and outpatient follow up). The patient has received 2 units PRBCs during this " hospitalization, the last being ~ 2am on 2/24. The patient developed hypoxemia overnight 2/23 into 2/24 (87% on RA) and required 2-3L NC. CXRY on 2/24 revealed interval development of bilateral patchy interstitial and parietal attenuation reflective of edema vs infection. The patient was given a x1 dose of lasix 20mg and maintenance IVF were discontinued. On the evening of 2/24 the patient developed worsening infiltrates and increasing oxygen requirements, needing comfort flow 30L 80% to maintain oxygen levels. Critical Care Medicine was consulted for hypoxemic respiratory failure.      Upon initial evaluation the patient had decreasing oxygen requirements, subjectively felt better and was deemed stable for the floor. Upon re-eval, oxygen requirements had increased back to 30L 70% and it was decided to move the patient to the ICU for closer monitoring     Hospital/ICU Course: Mr. Messina was admitted to the ICU with acute hypoxemic respiratory failure requiring comfort flow.  Antibiotics were broadened to (vancomycin + unasyn + azithromycin). He was also given lasix for diuresis with good response.  Oxygen requirements are slowing decreasing and creatinine is improving. Vasculitis workup pending. Echo with thickened mitral valve with evidence of systolic flattening, cardiology consulted for probable SOWMYA    Interval History/Significant Events: 1 positive blood culture reported. Slight increase in O2 demands overnight, weaning this morning. NPO for possible SOWMYA today    Review of Systems   Constitutional: Negative for chills and fever.   Eyes: Positive for redness.   Respiratory: Positive for cough. Negative for shortness of breath.    Cardiovascular: Negative for chest pain, palpitations and leg swelling.   Gastrointestinal: Negative for abdominal pain, constipation, diarrhea, nausea and vomiting.   Endocrine: Negative for cold intolerance and heat intolerance.   Genitourinary:        Urinary retention     Musculoskeletal: Positive for myalgias.   Skin: Negative for rash and wound.   Neurological: Positive for weakness. Negative for light-headedness.   Hematological: Bruises/bleeds easily.   Psychiatric/Behavioral: Negative for agitation.     Objective:     Vital Signs (Most Recent):  Temp: 99 °F (37.2 °C) (02/27/18 0300)  Pulse: (!) 111 (02/27/18 0800)  Resp: (!) 31 (02/27/18 0800)  BP: (!) 144/68 (02/27/18 0800)  SpO2: (!) 94 % (02/27/18 0800) Vital Signs (24h Range):  Temp:  [98.6 °F (37 °C)-99.2 °F (37.3 °C)] 99 °F (37.2 °C)  Pulse:  [107-118] 111  Resp:  [16-45] 31  SpO2:  [87 %-99 %] 94 %  BP: (124-148)/(56-76) 144/68     Weight: 76 kg (167 lb 8.8 oz)  Body mass index is 24.74 kg/m².      Intake/Output Summary (Last 24 hours) at 02/27/18 0834  Last data filed at 02/27/18 0225   Gross per 24 hour   Intake              700 ml   Output             2075 ml   Net            -1375 ml       Physical Exam   Constitutional: He is oriented to person, place, and time. He appears well-developed and well-nourished. No distress.   HENT:   Head: Normocephalic and atraumatic.   Eyes: Pupils are equal, round, and reactive to light. Right conjunctiva has a hemorrhage.   Neck: Normal range of motion. Neck supple. No JVD present.   Cardiovascular: Regular rhythm and normal pulses.  Tachycardia present.    Murmur heard.  Mitral murmur  Warm extremities, no peripheral edema   Pulmonary/Chest: Breath sounds normal. No accessory muscle usage. Tachypnea noted. No respiratory distress. He has no wheezes. He has no rales.   Abdominal: Soft. Bowel sounds are normal. He exhibits no distension. There is no tenderness.   Genitourinary:   Genitourinary Comments: Urinary catheter in place   Musculoskeletal: He exhibits no edema.   Neurological: He is alert and oriented to person, place, and time.   Skin: Skin is warm and dry. Capillary refill takes less than 2 seconds. He is not diaphoretic.   Psychiatric: He has a normal mood and affect. His  behavior is normal. Judgment and thought content normal.   Nursing note reviewed.      Vents:  Oxygen Concentration (%): 40 (02/27/18 0800)    Lines/Drains/Airways     Drain                 Urethral Catheter 02/15/18 16 Fr. 12 days          Peripheral Intravenous Line                 Peripheral IV - Single Lumen 02/21/18 2120 Right Upper Arm 5 days         Peripheral IV - Single Lumen 02/25/18 0827 Left Antecubital 2 days                Significant Labs:    CBC/Anemia Profile:    Recent Labs  Lab 02/26/18  0315 02/27/18  0409   WBC 10.32 7.63   HGB 8.3* 7.4*   HCT 25.1* 22.8*    177   MCV 83 84   RDW 19.4* 19.4*        Chemistries:    Recent Labs  Lab 02/25/18  0846  02/26/18  0315 02/26/18  1112 02/26/18  1600 02/27/18  0409   *  < > 139 137 139  --    K 5.2*  < > 5.4* 5.0 4.4  --      < > 108 106 107  --    CO2 19*  < > 18* 21* 20*  --    BUN 22  < > 24* 27* 28*  --    CREATININE 1.6*  < > 1.4 1.3 1.4  --    CALCIUM 8.8  < > 9.1 9.1 9.3  --    ALBUMIN 2.5*  < > 2.4* 2.3* 2.4* 2.3*   PROT  --   --  6.7  --   --  6.3   BILITOT  --   --  1.5*  --   --  1.4*   ALKPHOS  --   --  76  --   --  65   ALT  --   --  32  --   --  29   AST  --   --  41*  --   --  31   MG 1.9  --  1.8  --   --  2.0   PHOS 3.7  3.7  < > 4.6* 4.8* 4.7* 4.9*   < > = values in this interval not displayed.    BMP:   Recent Labs  Lab 02/26/18  1600 02/27/18  0409     --      --    K 4.4  --      --    CO2 20*  --    BUN 28*  --    CREATININE 1.4  --    CALCIUM 9.3  --    MG  --  2.0     CMP:   Recent Labs  Lab 02/26/18  0315 02/26/18  1112 02/26/18  1600 02/27/18  0409    137 139  --    K 5.4* 5.0 4.4  --     106 107  --    CO2 18* 21* 20*  --    GLU 96 130* 108  --    BUN 24* 27* 28*  --    CREATININE 1.4 1.3 1.4  --    CALCIUM 9.1 9.1 9.3  --    PROT 6.7  --   --  6.3   ALBUMIN 2.4* 2.3* 2.4* 2.3*   BILITOT 1.5*  --   --  1.4*   ALKPHOS 76  --   --  65   AST 41*  --   --  31   ALT 32  --   --  29    ANIONGAP 13 10 12  --    EGFRNONAA 51.6* 56.5* 51.6*  --        Significant Imaging:  I have reviewed all pertinent imaging results/findings within the past 24 hours.    Assessment/Plan:     Active Diagnoses:    Diagnosis Date Noted POA    PRINCIPAL PROBLEM:  Acute hypoxemic respiratory failure [J96.01] 02/24/2018 Yes    Mitral valve vegetation [I33.0] 02/26/2018 No    Asymptomatic bacteriuria [R82.71] 02/24/2018 Yes    Symptomatic anemia [D64.9] 02/22/2018 Yes    ANNA (acute kidney injury) [N17.9] 02/22/2018 Yes    Chronic cough [R05] 02/22/2018 Yes    Pulmonary nodules [R91.8] 02/22/2018 Yes    Anemia [D64.9] 01/27/2018 Yes    Anxiety [F41.9] 12/17/2015 Yes    HTN (hypertension) [I10] 11/09/2014 Yes     Chronic      Problems Resolved During this Admission:    Diagnosis Date Noted Date Resolved POA    Shortness of breath [R06.02] 02/24/2018 02/27/2018 Yes    Mild malnutrition [E44.1] 02/22/2018 02/27/2018 Yes       Acute Hypoxemic Respiratory Failure  -infectious vs pulmonary edema vs alveolar hemorrhage  -minimal hemoptysis  -Vanc, Unasyn, & Azithro for HCAP w/ atypicals  -chest xray improving with diuresis  -negative 11L since admit, creatinine improving  -on 40% 15L HFNC, continue weaning  -duonebs PRN       Mitral Murmur  -new MV murmur  -Echo 2/24 with possible MV vegetation  -1 out of 2 blood cultures with gram + cocci  -afebrile  -Cards consulted, SOWMYA today or tomorrow if O2 requirements <10L  -keep NPO    Anemia  -minimal hemoptysis, no overt bleeding noted  -EGD & colonoscopy on 2/1 with no source of bleed   -H&H 7.4/22.8 today  -received 2 units PRBCs this hospitalization, last 2/24  -lysis labs normal   -vasculitis workup in progress  -transfuse if <7      Critical Care Time: 45 minutes  Critical secondary to Patient has a condition that poses threat to life and bodily function: Severe Respiratory Distress     Critical care was time spent personally by me on the following activities:  development of treatment plan with patient or surrogate and bedside caregivers, discussions with consultants, evaluation of patient's response to treatment, examination of patient, ordering and performing treatments and interventions, ordering and review of laboratory studies, ordering and review of radiographic studies, pulse oximetry, re-evaluation of patient's condition.  This critical care time did not overlap with that of any other provider or involve time for any procedures.     MELE Luevano, [facility] Student  Critical Care - Medicine  [facility]

## 2018-02-27 NOTE — PROGRESS NOTES
[facility]  Cardiology  Progress Note    Patient Name: Rodolfo Messina  MRN: [id number]  Admission Date: 2/21/2018  Hospital Length of Stay: 6 days  Code Status: Full Code   Attending Physician: Graham Meehan MD   Primary Care Physician: Deric Claudio MD  Expected Discharge Date:   Principal Problem:Acute hypoxemic respiratory failure    Subjective:     Interval History: Oxygen requirements increased to 20 L @ 40% O2 HFNC yesterday evening and overnight, reduced back to 15 L this AM. Still having difficulty reducing oxygenation requirements. Otherwise no new complaints.    Review of Systems   Constitution: Negative for chills, fever and weight loss.   HENT: Negative for sore throat and stridor.    Eyes: Negative for blurred vision, double vision and pain.   Cardiovascular: Positive for dyspnea on exertion and palpitations. Negative for chest pain, claudication, near-syncope, orthopnea, paroxysmal nocturnal dyspnea and syncope.   Respiratory: Positive for cough and shortness of breath. Negative for sputum production and wheezing.    Endocrine: Negative for polydipsia, polyphagia and polyuria.   Skin: Negative for rash.   Musculoskeletal: Negative for joint pain, joint swelling and myalgias.   Gastrointestinal: Negative for abdominal pain, constipation, diarrhea, heartburn, melena, nausea and vomiting.   Genitourinary: Negative for dysuria and hematuria.   Neurological: Negative for focal weakness and loss of balance.   Psychiatric/Behavioral: Negative for depression and memory loss.     Objective:     Vital Signs (Most Recent):  Temp: 99.2 °F (37.3 °C) (02/27/18 0758)  Pulse: (!) 111 (02/27/18 1113)  Resp: (!) 31 (02/27/18 1113)  BP: (!) 148/65 (02/27/18 0900)  SpO2: (!) 93 % (02/27/18 1113) Vital Signs (24h Range):  Temp:  [98.6 °F (37 °C)-99.2 °F (37.3 °C)] 99.2 °F (37.3 °C)  Pulse:  [107-118] 111  Resp:  [16-45] 31  SpO2:  [87 %-99 %] 93 %  BP: (124-148)/(56-76) 148/65     Weight: 76  kg (167 lb 8.8 oz)  Body mass index is 24.74 kg/m².     SpO2: (!) 93 %  O2 Device (Oxygen Therapy): nasal cannula    Intake/Output Summary (Last 24 hours) at 02/27/18 1153  Last data filed at 02/27/18 0951   Gross per 24 hour   Intake              935 ml   Output             2050 ml   Net            -1115 ml     Lines/Drains/Airways     Drain                 Urethral Catheter 02/15/18 16 Fr. 12 days          Peripheral Intravenous Line                 Peripheral IV - Single Lumen 02/21/18 2120 Right Upper Arm 5 days         Peripheral IV - Single Lumen 02/25/18 0827 Left Antecubital 2 days              Physical Exam   Constitutional: He is oriented to person, place, and time. He appears well-developed and well-nourished. He appears ill.   HENT:   Head: Normocephalic and atraumatic.   Eyes: EOM are normal. No scleral icterus.   Right eye conjunctival redness.    Neck: Neck supple.   Cardiovascular: Regular rhythm and intact distal pulses.    Murmur (systolic at the mitral area. ) heard.  Pulmonary/Chest: He is in respiratory distress. He has no wheezes. He has no rales.   On 15L @ 40% O2 HFNC   Musculoskeletal: He exhibits no edema.   Neurological: He is alert and oriented to person, place, and time.   Skin: Skin is warm and dry. He is not diaphoretic. There is pallor.   No splinter hemorrhage or clubbing.    Psychiatric: He has a normal mood and affect.     Significant Labs:   CMP   Recent Labs  Lab 02/26/18  0315 02/26/18  1112 02/26/18  1600 02/27/18  0409    137 139  --    K 5.4* 5.0 4.4  --     106 107  --    CO2 18* 21* 20*  --    GLU 96 130* 108  --    BUN 24* 27* 28*  --    CREATININE 1.4 1.3 1.4  --    CALCIUM 9.1 9.1 9.3  --    PROT 6.7  --   --  6.3   ALBUMIN 2.4* 2.3* 2.4* 2.3*   BILITOT 1.5*  --   --  1.4*   ALKPHOS 76  --   --  65   AST 41*  --   --  31   ALT 32  --   --  29   ANIONGAP 13 10 12  --    ESTGFRAFRICA 59.7* >60.0 59.7*  --    EGFRNONAA 51.6* 56.5* 51.6*  --     and CBC   Recent  Labs  Lab 02/26/18  0315 02/27/18  0409   WBC 10.32 7.63   HGB 8.3* 7.4*   HCT 25.1* 22.8*    177       Significant Imaging: Echocardiogram:   2D echo with color flow doppler:   Results for orders placed or performed during the hospital encounter of 02/21/18   2D echo with color flow doppler   Result Value Ref Range    EF 68 55 - 65    Mitral Valve Regurgitation MODERATE (A)     Est. PA Systolic Pressure 51.56 (A)     Mitral Valve Mobility SYSTOLIC FLATTENING     Tricuspid Valve Regurgitation MILD      Assessment and Plan:     Mitral valve vegetation    Patient is presenting with recurrent anemia. During his hospitalization he received 2 units of PRBCs with appropriate response. He developed hypoxemic respiratory failure on 2/25 and was transferred to MICU. CT chest with multiple pulmonary nodules. CXR with bilateral infiltrates. Echo with mitral regurgitation and mobile density/thickening, unclear if mass vs. Vegetation vs. benign structural element    - Afebrile with no leukocytosis, still low likelihood for endocarditis  - Blood Cx x 1 from 2/25 now with Gram (+) cocci, awaiting speciation (Culture #2 still NGTD, so possible contaminant)  - Repeat Blood Cx's x 2 from yesterday in process  - Still plan for eventual SOWMYA to better assess mitral valve, however oxygen requirements still too high to safely do a SOWMYA without risk of decompensation; will defer for later today or later in the week if O2 can be weaned  - Agree with broad spectrum Abx for now per primary team and ID          VTE Risk Mitigation         Ordered     enoxaparin injection 40 mg  Daily     Route:  Subcutaneous        02/26/18 1935     Medium Risk of VTE  Once      02/22/18 0611     Place sequential compression device  Until discontinued      02/22/18 0343     Place MOHAN hose  Until discontinued      02/22/18 0343        Patient seen and examined this morning. Thank you for the opportunity to participate in the care of this interesting  patient. Discussed with Dr. Salmon - staff attestation to follow.    Salo Madrigal MD  Cardiology  [facility]

## 2018-02-27 NOTE — NURSING
Spoke with Andrzej NP with [facility]. Reported increase in audible wheezing from patient and sats 88-90%. Received new orders for breathing treatment and to increase flow on HFNC.

## 2018-02-27 NOTE — SUBJECTIVE & OBJECTIVE
Interval History/Significant Events: afebrile. One of 2 blood cultures from 2/25 resulted with GPC's. Denies complaints other than cough.     Review of Systems   Constitutional: Negative for chills and fever.   HENT: Negative for nosebleeds, sore throat and trouble swallowing.    Respiratory: Positive for cough (improving. no hemoptysis ). Negative for chest tightness, shortness of breath and wheezing.    Cardiovascular: Negative for chest pain and palpitations.   Gastrointestinal: Negative for abdominal distention, abdominal pain, blood in stool, diarrhea, nausea and vomiting.   Genitourinary: Negative for dysuria, frequency, hematuria and urgency.   Neurological: Positive for tremors. Negative for speech difficulty, weakness, light-headedness and headaches.     Objective:     Vital Signs (Most Recent):  Temp: 99 °F (37.2 °C) (02/27/18 0300)  Pulse: (!) 111 (02/27/18 0800)  Resp: (!) 31 (02/27/18 0800)  BP: (!) 144/68 (02/27/18 0800)  SpO2: (!) 94 % (02/27/18 0800) Vital Signs (24h Range):  Temp:  [98.6 °F (37 °C)-99.2 °F (37.3 °C)] 99 °F (37.2 °C)  Pulse:  [107-118] 111  Resp:  [16-45] 31  SpO2:  [87 %-99 %] 94 %  BP: (124-148)/(56-76) 144/68   Weight: 76 kg (167 lb 8.8 oz)  Body mass index is 24.74 kg/m².      Intake/Output Summary (Last 24 hours) at 02/27/18 0840  Last data filed at 02/27/18 0225   Gross per 24 hour   Intake              600 ml   Output             2075 ml   Net            -1475 ml       Physical Exam   Constitutional: He is oriented to person, place, and time. He appears well-developed. He has a sickly appearance. He appears ill. No distress.   HENT:   Head: Normocephalic and atraumatic.   No obvious cracked teeth. Fillings noted. Non oral ulcerations.    Eyes: EOM are normal. Pupils are equal, round, and reactive to light. Right eye exhibits no discharge. Left eye exhibits no discharge. Right conjunctiva has a hemorrhage. No scleral icterus.   Neck: Normal range of motion. Neck supple. No JVD  present. No tracheal deviation present. No thyromegaly present.   Cardiovascular: Normal rate, regular rhythm, S1 normal and S2 normal.    Murmur heard.   Systolic murmur is present with a grade of 3/6   Pulses:       Radial pulses are 2+ on the right side, and 2+ on the left side.        Dorsalis pedis pulses are 2+ on the right side, and 2+ on the left side.   Warm extremities, no peripheral edema   Pulmonary/Chest: No accessory muscle usage. No tachypnea. No respiratory distress. He has no decreased breath sounds. He has no wheezes. He has no rales.   Abdominal: Soft. Bowel sounds are normal. He exhibits no distension. There is no guarding.   Lymphadenopathy:     He has no cervical adenopathy.   Neurological: He is alert and oriented to person, place, and time. [facility] eye subscore is 4. [facility] verbal subscore is 5. [facility] motor subscore is 6.   Following all commands. Moving all extremities spontaneously and symmetrically    Skin: Skin is dry. Capillary refill takes less than 2 seconds. He is not diaphoretic. There is pallor.   No noted sores, skin breakdown.        Vents:  Oxygen Concentration (%): 40 (02/27/18 0800)  Lines/Drains/Airways     Drain                 Urethral Catheter 02/15/18 16 Fr. 12 days          Peripheral Intravenous Line                 Peripheral IV - Single Lumen 02/21/18 2120 Right Upper Arm 5 days         Peripheral IV - Single Lumen 02/25/18 0827 Left Antecubital 2 days              Significant Labs:    CBC/Anemia Profile:    Recent Labs  Lab 02/26/18  0315 02/27/18  0409   WBC 10.32 7.63   HGB 8.3* 7.4*   HCT 25.1* 22.8*    177   MCV 83 84   RDW 19.4* 19.4*        Chemistries:    Recent Labs  Lab 02/25/18  0846  02/26/18  0315 02/26/18  1112 02/26/18  1600 02/27/18  0409   *  < > 139 137 139  --    K 5.2*  < > 5.4* 5.0 4.4  --      < > 108 106 107  --    CO2 19*  < > 18* 21* 20*  --    BUN 22  < > 24* 27* 28*  --    CREATININE 1.6*  < > 1.4 1.3 1.4  --    CALCIUM 8.8  < > 9.1  9.1 9.3  --    ALBUMIN 2.5*  < > 2.4* 2.3* 2.4* 2.3*   PROT  --   --  6.7  --   --  6.3   BILITOT  --   --  1.5*  --   --  1.4*   ALKPHOS  --   --  76  --   --  65   ALT  --   --  32  --   --  29   AST  --   --  41*  --   --  31   MG 1.9  --  1.8  --   --  2.0   PHOS 3.7  3.7  < > 4.6* 4.8* 4.7* 4.9*   < > = values in this interval not displayed.      Significant Imaging:  I have reviewed and interpreted all pertinent imaging results/findings within the past 24 hours.

## 2018-02-27 NOTE — ASSESSMENT & PLAN NOTE
--previously attributed to bactrim (started for proteus UTI), now d/c'd; baseline creatinine ~ 1.1  --Renal US with evidence of medical renal disease  --creatinine slowly improving  --will discuss continuing diuresis as tolerated.

## 2018-02-27 NOTE — TRANSFER OF CARE
"Anesthesia Transfer of Care Note    Patient: Rodolfo Messina    Procedure(s) Performed: Procedure(s) (LRB):  TRANSESOPHAGEAL ECHOCARDIOGRAM (SOWMYA) (N/A)    Patient location: Other: ccu    Anesthesia Type: general    Transport from OR: Transported from OR on 2-3 L/min O2 by NC with adequate spontaneous ventilation    Post pain: adequate analgesia    Post assessment: no apparent anesthetic complications    Post vital signs: stable    Level of consciousness: awake and sedated    Complications: none          Last vitals:   Visit Vitals  BP 97/63 (BP Location: Left arm, Patient Position: Lying)   Pulse (!) 113   Temp 37.3 °C (99.2 °F) (Oral)   Resp (!) 31   Ht 5' 9" (1.753 m)   Wt 76 kg (167 lb 8.8 oz)   SpO2 (!) 92%   BMI 24.74 kg/m²     "

## 2018-02-27 NOTE — ASSESSMENT & PLAN NOTE
--suspect related to pulmonary edema given acute worsening while admitted with >3 L crystalloids, PRBC combined volume during first days of admission. However, also covering for pneumonia  --has improved with diuresis and is net negative 11 L this admission. Oxygen requirements slowly improving; continue to wean HFNC as tolerated for goal saturations of 90%  --Continue empiric coverage with Vancomycin, Unasyn, and Azithromycin for now (started 2/25). Sputum culture from 2/25 not performed as inadequate specimen. Not currently with productive cough so hold on recollect  --concern for possible DAH previously given concurrent anemia with acute worsening hypoxemia/bilateral infiltrates. However, now with scant amount of hemoptysis. MAGNO negative. Awaiting ANCA, glomerular basement membrane antibody

## 2018-02-27 NOTE — PLAN OF CARE
Problem: Patient Care Overview  Goal: Plan of Care Review  Outcome: Ongoing (interventions implemented as appropriate)  No acute events overnight. Comfort flow increased to 50% and 20L overnight. VS otherwise stable. Pt. With adequate UO overnight. POC for continued tx of suspected pneumonia with possible SOWMYA to r/out endocarditis in the future. POC reviewed and discussed with pt. And spouse. All questions and concerns were addressed. Pt. And spouse verbalized understanding.

## 2018-02-27 NOTE — ASSESSMENT & PLAN NOTE
--unclear etiology; inflammation, infection, vs other  --non-smoker, h/o occupational asbestos exposure  --repeat CT in 3-6 months

## 2018-02-27 NOTE — PROGRESS NOTES
"[facility]  Critical Care Medicine  Progress Note    Patient Name: Rodolfo Messina  MRN: [id number]  Admission Date: 2/21/2018  Hospital Length of Stay: 5 days  Code Status: Full Code  Attending Provider: Graham Meehan MD  Primary Care Provider: Deric Claudio MD   Principal Problem: Acute hypoxemic respiratory failure    Subjective:     HPI:  Mr. Messina is a 67 y.o. male with significant past medical history of HTN and essential tremor, recently with urinary retention requiring indwelling lackey presented to hospital complaining of low Hgb. The patient was recently admitted 1/27/18 - 01/28/18 for symptomatic anemia associated with fatigue. He received one unit blood transfusion with improved Hgb from 7 to 8.5. Follow up EGD and colonoscopy on 02/01/18 showed non bleeding erosive gastropathy, internal hemorrhoid and diverticulosis in sigmoid and descending colon. The patient had routine labs done by his PCP which revealed hgb 7.1. The patient's PCP called him and told him to report to the Emergency Department for further workup.     The patient endorses a non-productive cough since December, with progression to green productive sputum x3 days and scant blood tinged today. Prior to this hospitalization the patient reports a few day history of fever/chills and malaise. He currently endorses a "grabbing" feeling in his chest when he inhales which is only relieved with coughing as well as abdominal musculature pain associated with coughing.     Work up in the ED revealed a hgb of 6.7, creatinine 2 (baseline ~ 1.1). UA was negative for infection. Stool was neg for occult blood.  The patient was admitted to Hospital Medicine for further management. CTA chest revealed multiple pulmonary nodules for which Pulmonology was consulted (patient has occupational asbestos exposure) > recommended adding duo nebs and outpatient follow up). The patient has received 2 units PRBCs during this " hospitalization, the last being ~ 2am on 2/24. The patient developed hypoxemia overnight 2/23 into 2/24 (87% on RA) and required 2-3L NC. CXRY on 2/24 revealed interval development of bilateral patchy interstitial and parietal attenuation reflective of edema vs infection. The patient was given a x1 dose of lasix 20mg and maintenance IVF were discontinued. On the evening of 2/24 the patient developed worsening infiltrates and increasing oxygen requirements, now needing comfort flow 30L 80% to maintain oxygen levels. Critical Care Medicine was consulted for hypoxemic respiratory failure.     Upon initial evaluation the patient had decreasing oxygen requirements, subjectively felt better and was deemed stable for the floor. Upon re-eval, oxygen requirements had increased back to 30L 70% and it was decided to move the patient to the ICU for closer monitoring.     Hospital/ICU Course:  Mr. Messina was admitted to the ICU with acute hypoxemic respiratory failure requiring comfort flow.  Antibiotics were broadened to (vancomycin + unasyn + azithromycin). He was also given lasix for diuresis with good response.  Oxygen requirements are slowing decreasing and creatinine is improving. Vasculitis workup pending. TTE with thickened mitral valve with evidence of systolic flattening    Interval History/Significant Events: No acute events overnight. Remains on comfort flow.  Coughing improved.     Review of Systems   Constitutional: Negative for chills and fever.   HENT: Negative for sore throat.    Eyes: Negative for pain and visual disturbance.   Respiratory: Positive for cough. Negative for shortness of breath.    Cardiovascular: Negative for chest pain, palpitations and leg swelling.   Gastrointestinal: Positive for abdominal pain. Negative for abdominal distention, constipation, diarrhea, nausea and vomiting.   Genitourinary: Negative for hematuria.   Skin: Negative for rash and wound.   Neurological: Negative for dizziness,  weakness and headaches.     Objective:     Vital Signs (Most Recent):  Temp: 98.9 °F (37.2 °C) (02/26/18 0300)  Pulse: (!) 111 (02/26/18 0725)  Resp: (!) 31 (02/26/18 0725)  BP: 133/77 (02/26/18 0500)  SpO2: 95 % (02/26/18 0725) Vital Signs (24h Range):  Temp:  [97.9 °F (36.6 °C)-99.5 °F (37.5 °C)] 98.9 °F (37.2 °C)  Pulse:  [109-124] 111  Resp:  [21-56] 31  SpO2:  [86 %-100 %] 95 %  BP: (129-162)/(58-82) 133/77   Weight: 76 kg (167 lb 8.8 oz)  Body mass index is 24.74 kg/m².      Intake/Output Summary (Last 24 hours) at 02/26/18 0902  Last data filed at 02/26/18 0500   Gross per 24 hour   Intake             1250 ml   Output             2370 ml   Net            -1120 ml       Physical Exam   Constitutional: No distress.   HENT:   Head: Normocephalic.   Nose: No rhinorrhea. No epistaxis.   Mouth/Throat: Oropharynx is clear and moist and mucous membranes are normal.   Eyes: EOM are normal. Pupils are equal, round, and reactive to light. Right conjunctiva has a hemorrhage. Right eye exhibits no nystagmus. Left eye exhibits no nystagmus.   Neck: No JVD present.   Cardiovascular: Normal rate, regular rhythm, normal heart sounds and normal pulses.    No murmur heard.  Warm, no peripheral edema   Pulmonary/Chest: Tachypnea noted. He has no decreased breath sounds. He has no wheezes. He has no rhonchi. He has rales.   Abdominal: Soft. Bowel sounds are normal. He exhibits no distension. There is no tenderness.   Lymphadenopathy:     He has no cervical adenopathy.   Neurological: He is alert. No cranial nerve deficit or sensory deficit. GCS eye subscore is 4. GCS verbal subscore is 5. GCS motor subscore is 6.   Skin: Skin is warm and dry. No rash noted.   Nursing note and vitals reviewed.      Vents:  Oxygen Concentration (%): 60 (02/26/18 0725)  Lines/Drains/Airways     Drain                 Urethral Catheter 02/15/18 16 Fr. 11 days          Peripheral Intravenous Line                 Peripheral IV - Single Lumen 02/21/18  2120 Right Upper Arm 4 days         Peripheral IV - Single Lumen 02/25/18 0827 Left Antecubital 1 day              Significant Labs:    CBC/Anemia Profile:    Recent Labs  Lab 02/25/18 0226 02/26/18 0315   WBC 11.61 10.32   HGB 8.2* 8.3*   HCT 25.2* 25.1*    166   MCV 84 83   RDW 19.2* 19.4*        Chemistries:    Recent Labs  Lab 02/25/18 0226 02/25/18 0846 02/25/18 2015 02/26/18 0315   * 133* 137 139   K 5.4* 5.2* 4.9 5.4*    106 108 108   CO2 19* 19* 19* 18*   BUN 23 22 22 24*   CREATININE 1.7* 1.6* 1.4 1.4   CALCIUM 8.9 8.8 8.9 9.1   ALBUMIN 2.6* 2.5* 2.4* 2.4*   PROT 6.5  --   --  6.7   BILITOT 1.1*  --   --  1.5*   ALKPHOS 79  --   --  76   ALT 35  --   --  32   AST 45*  --   --  41*   MG  --  1.9  --  1.8   PHOS  --  3.7  3.7 4.2 4.6*       All pertinent labs within the past 24 hours have been reviewed.    Significant Imaging:  I have reviewed and interpreted all pertinent imaging results/findings within the past 24 hours.    Assessment/Plan:     Psychiatric   Anxiety    --holding home alprazolam given tenuous respiratory status        Pulmonary   * Acute hypoxemic respiratory failure    --concern for HCAP vs pulmonary edema vs alveolar hemorrhage  --small improvement diuresis, redose lasix  --continue vancomycin, unasyn and azithromycin  --Sputum with inadequate specimen, respiratory viral panel pending. Blood cultures no growth to date.  Remains afebrile without leukocytosis.   --continue comfort flow; wean as tolerated  --vasculitis labs pending.         Pulmonary nodules    --unclear etiology; inflammation, infection, vs other  --non-smoker, h/o occupational asbestos exposure  --repeat CT in 3-6 months            Cardiac/Vascular   Mitral valve vegetation    --blood cultures have been negative  --consult cardiology  --possible SOWMYA        HTN (hypertension)    --continue home amlodipine        Renal/   Asymptomatic bacteriuria    --concern for HCAP vs pulmonary edema  --2D echo  performed, read pending. Will repeat BNP today. Patient has no previously known history of heart failure. Additional dose of lasix x1 administered by primary team overnight. Would avoid continuous fluids and consider additional diuresis as clinical picture dictates  --will broaden out antibiotics for HCAP and atypical coverage: Vanc, unasyn and azithromycin  --checking sputum cx and RSV panel  --recommend following daily chest xrays    --on re-evaluation of the patient, able to titrate down on O2 requirements from 30L 80% comfort flow to 30L 50% comfort flow. Patient states he subjectively feels better. Feel that patient is stable for the floor at this time. Please continue to titrate down on O2 as tolerated.         ANNA (acute kidney injury)    --previously attributed to bactrim (started for proteus UTI), now d/c'd; baseline creatinine ~ 1.1  --Renal US with evidence of medical renal disease  --creatinine slowly improving with diuresis        Urinary Retention  --continue urine catheter for urinary retention.  --seen by urology outpatient on 2/14; plan for outpatient cystoscopy   --continue tamsulosin  --recent CT abdomen/pelvis on 1/14/18  with urinary bladder wall thickening consistent with cystitis or chronic bladder outlet obstruction.      Oncology   Anemia    --no evidence of ongoing GIB, previous EGD and C scope without source of bleeding  --s/p 2 units this admission  --grossly unchanged.   --continue to monitor.            Critical Care Daily Checklist:    A: Awake: RASS Goal/Actual Goal: RASS Goal: 0-->alert and calm  Actual: Edwards Agitation Sedation Scale (RASS): Alert and calm   B: Spontaneous Breathing Trial Performed?  n/a   C: SAT & SBT Coordinated?  n/a                    D: Delirium: CAM-ICU Overall CAM-ICU: Negative   E: Early Mobility Performed? No, oxygenation to tenuous    F: Feeding Goal: Goals: consume > 85% EEN/EPN  Status: Nutrition Goal Status: new   Current Diet Order   Procedures     Diet NPO Except for: Medication     Order Specific Question:   Except for     Answer:   Medication    Diet NPO      AS: Analgesia/Sedation n/a   T: Thromboembolic Prophylaxis SCDs, holding chemical prophylaxis given bleeding risk with hemoptysis    H: HOB > 300 Yes   U: Stress Ulcer Prophylaxis (if needed) n/a   G: Glucose Control Within goal   B: Bowel Function Stool Occurrence: 1   I: Indwelling Catheter (Lines & Lackey) Necessity Urine lackey  PIVs   D: De-escalation of Antimicrobials/Pharmacotherapies See above    Plan for the day/ETD Supportive care    Code Status:  Family/Goals of Care: Full Code       Mr. Messina and his wife were updated at bedside.     Discussed plan with Dr. Meehan.   Critical Care Time: 45 minutes  Critical secondary to Patient has a condition that poses threat to life and bodily function: Acute Hypoxemic Respiratory Failure      Critical care was time spent personally by me on the following activities: development of treatment plan with patient or surrogate and bedside caregivers, discussions with consultants, evaluation of patient's response to treatment, examination of patient, ordering and performing treatments and interventions, ordering and review of laboratory studies, ordering and review of radiographic studies, pulse oximetry, re-evaluation of patient's condition. This critical care time did not overlap with that of any other provider or involve time for any procedures.     Manasa Donnelly NP  Critical Care Medicine  [facility]

## 2018-02-27 NOTE — ASSESSMENT & PLAN NOTE
--appreciate cardiology's assistance. TTE with concern for mitral valve vegetation; also with noted moderate MR  --Will plan for SOWMYA today vs tomorrow if able to wean oxygen requirements down to <10 L   --blood culture from 2/25 now with GPC's; awaiting speciation. Have repeated cultures and continuing vancomycin

## 2018-02-27 NOTE — CONSULTS
TRANSESOPHAGEAL ECHOCARDIOGRAPHY   PRE-PROCEDURE NOTE    02/27/2018    HPI:     Rodolfo Messina is a 67 y.o. man with PMHx of HTN, BPH who presents with symptomatic anemia of unclear etiology, cough and SOB, found to have a mobile mitral valve echodensity concerning for a vegetation vs. Thrombus. Primary team asking for SOWMYA to assess for possible IE vs atrial mass    Dysphagia or odynophagia:  No  Liver Disease, esophageal disease, or known varices:  No  Upper GI Bleeding: No  Snoring:  No  Sleep Apnea:  Yes  Prior neck surgery or radiation:  No  History of anesthetic difficulties:  No  Family history of anesthetic difficulties:  No  Last oral intake:  12 hours ago  Able to move neck in all directions:  No      Meds:     Scheduled Meds:   amLODIPine  10 mg Oral Daily    ampicillin-sulbactim (UNASYN) IVPB  3 g Intravenous Q6H    azithromycin  500 mg Intravenous Q24H    enoxaparin  40 mg Subcutaneous Daily    fluticasone  1 spray Each Nare BID    mirtazapine  30 mg Oral QHS    sodium chloride  1 spray Each Nare BID    tamsulosin  0.8 mg Oral QHS    [START ON 2/28/2018] vancomycin (VANCOCIN) IVPB  1,250 mg Intravenous Q24H     PRN Meds:acetaminophen, albuterol-ipratropium 2.5mg-0.5mg/3mL, benzonatate, dextromethorphan-guaifenesin  mg/5 ml, ondansetron, oxyCODONE  Continuous Infusions:    Physical Exam:     Vitals:  Temp:  [98.6 °F (37 °C)-99.2 °F (37.3 °C)]   Pulse:  [107-118]   Resp:  [16-45]   BP: (124-148)/(56-76)   SpO2:  [87 %-97 %]        Constitutional: NAD, conversant  HEENT:   Sclera anicteric, Uvula midline, EOMI, OP clear  Neck:               No JVD, moves to all direction without any limitations  CV:               RRR, no murmurs / rubs / gallops, normal S1/S2  Pulm:               CTAB, no wheezes, rales, or ronchi  GI:               Abdomen soft, NTND, +BS  Extremities:              No LE edema, warm with palpable pulses  Neuro:   AAOX3, no focal motor deficits    Mallampati:3    ASA:2      Labs:     Recent Results (from the past 336 hour(s))   CBC auto differential    Collection Time: 02/27/18  4:09 AM   Result Value Ref Range    WBC 7.63 3.90 - 12.70 K/uL    Hemoglobin 7.4 (L) 14.0 - 18.0 g/dL    Hematocrit 22.8 (L) 40.0 - 54.0 %    Platelets 177 150 - 350 K/uL   CBC auto differential    Collection Time: 02/26/18  3:15 AM   Result Value Ref Range    WBC 10.32 3.90 - 12.70 K/uL    Hemoglobin 8.3 (L) 14.0 - 18.0 g/dL    Hematocrit 25.1 (L) 40.0 - 54.0 %    Platelets 166 150 - 350 K/uL   CBC auto differential    Collection Time: 02/25/18  2:26 AM   Result Value Ref Range    WBC 11.61 3.90 - 12.70 K/uL    Hemoglobin 8.2 (L) 14.0 - 18.0 g/dL    Hematocrit 25.2 (L) 40.0 - 54.0 %    Platelets 162 150 - 350 K/uL       No results found for this or any previous visit (from the past 336 hour(s)).    Estimated Creatinine Clearance: 51.2 mL/min (based on SCr of 1.4 mg/dL).    INR:   Lab Results   Component Value Date    INR 1.1 02/21/2018    INR 1.1 01/27/2018     Imaging:  TTE    1 - Normal left ventricular systolic function (EF 65-70%).     2 - Normal right ventricular systolic function .     3 - Indeterminate LV diastolic function.     4 - Biatrial enlargement.     5 - Mild tricuspid regurgitation.     6 - Moderate mitral regurgitation.     7 - The mitral valve is thickened with evidence of systolic flattening and occasionally seen is a mobile echodensity that is likely evidence of endocarditis.     8 - Pulmonary hypertension. The estimated PA systolic pressure is 52 mmHg.     9 - Intermediate central venous pressure.        Assessment & Plan:     PLAN:  1. SOWMYA for evaluation of MV mass vs vegetation vs thrombus    -The risks, benefits & alternatives of the procedure were explained to the patient.    -The risks of transesophageal echo include but are not limited to:  Dental trauma, esophageal trauma/perforation, bleeding, laryngospasm/brochospasm, aspiration, sore throat/hoarseness, & dislodgement  of the endotracheal tube/nasogastric tube (where applicable).    -The risks of moderate sedation include hypotension, respiratory depression, arrhythmias, bronchospasm, & death.    -Informed consent was obtained & the patient is agreeable to proceed with the procedure.    I will discuss with the attending physician. Attending addendum is to follow.     Further recommendations per attending addendum    Pradeep Stanford MD  [phone])  Cardiology Fellow

## 2018-02-27 NOTE — PROGRESS NOTES
"[facility]  Critical Care Medicine  Progress Note    Patient Name: Rodolfo Messina  MRN: [id number]  Admission Date: 2/21/2018  Hospital Length of Stay: 6 days  Code Status: Full Code  Attending Provider: Graham Meehan MD  Primary Care Provider: Deric Claudio MD   Principal Problem: Acute hypoxemic respiratory failure    Subjective:     HPI:  Mr. Messina is a 67 y.o. male with significant past medical history of HTN and essential tremor, recently with urinary retention requiring indwelling lackey presented to hospital complaining of low Hgb. The patient was recently admitted 1/27/18 - 01/28/18 for symptomatic anemia associated with fatigue. He received one unit blood transfusion with improved Hgb from 7 to 8.5. Follow up EGD and colonoscopy on 02/01/18 showed non bleeding erosive gastropathy, internal hemorrhoid and diverticulosis in sigmoid and descending colon. The patient had routine labs done by his PCP which revealed hgb 7.1. The patient's PCP called him and told him to report to the Emergency Department for further workup.     The patient endorses a non-productive cough since December, with progression to green productive sputum x3 days and scant blood tinged today. Prior to this hospitalization the patient reports a few day history of fever/chills and malaise. He currently endorses a "grabbing" feeling in his chest when he inhales which is only relieved with coughing as well as abdominal musculature pain associated with coughing.     Work up in the ED revealed a hgb of 6.7, creatinine 2 (baseline ~ 1.1). UA was negative for infection. Stool was neg for occult blood.  The patient was admitted to Hospital Medicine for further management. CTA chest revealed multiple pulmonary nodules for which Pulmonology was consulted (patient has occupational asbestos exposure) > recommended adding duo nebs and outpatient follow up). The patient has received 2 units PRBCs during this " hospitalization, the last being ~ 2am on 2/24. The patient developed hypoxemia overnight 2/23 into 2/24 (87% on RA) and required 2-3L NC. CXRY on 2/24 revealed interval development of bilateral patchy interstitial and parietal attenuation reflective of edema vs infection. The patient was given a x1 dose of lasix 20mg and maintenance IVF were discontinued. On the evening of 2/24 the patient developed worsening infiltrates and increasing oxygen requirements, now needing comfort flow 30L 80% to maintain oxygen levels. Critical Care Medicine was consulted for hypoxemic respiratory failure.     Upon initial evaluation the patient had decreasing oxygen requirements, subjectively felt better and was deemed stable for the floor. Upon re-eval, oxygen requirements had increased back to 30L 70% and it was decided to move the patient to the ICU for closer monitoring.     Hospital/ICU Course:  Mr. Messina was admitted to the ICU with acute hypoxemic respiratory failure requiring comfort flow.  Antibiotics were broadened to (vancomycin + unasyn + azithromycin). He was also given lasix for diuresis with good response.  Oxygen requirements are slowing decreasing and creatinine is improving. Vasculitis workup pending. TTE with thickened mitral valve with evidence of systolic flattening    Interval History/Significant Events: afebrile. One of 2 blood cultures from 2/25 resulted with GPC's. Denies complaints other than cough.     Review of Systems   Constitutional: Negative for chills and fever.   HENT: Negative for nosebleeds, sore throat and trouble swallowing.    Respiratory: Positive for cough (improving. no hemoptysis ). Negative for chest tightness, shortness of breath and wheezing.    Cardiovascular: Negative for chest pain and palpitations.   Gastrointestinal: Negative for abdominal distention, abdominal pain, blood in stool, diarrhea, nausea and vomiting.   Genitourinary: Negative for dysuria, frequency, hematuria and urgency.    Neurological: Positive for tremors. Negative for speech difficulty, weakness, light-headedness and headaches.     Objective:     Vital Signs (Most Recent):  Temp: 99 °F (37.2 °C) (02/27/18 0300)  Pulse: (!) 111 (02/27/18 0800)  Resp: (!) 31 (02/27/18 0800)  BP: (!) 144/68 (02/27/18 0800)  SpO2: (!) 94 % (02/27/18 0800) Vital Signs (24h Range):  Temp:  [98.6 °F (37 °C)-99.2 °F (37.3 °C)] 99 °F (37.2 °C)  Pulse:  [107-118] 111  Resp:  [16-45] 31  SpO2:  [87 %-99 %] 94 %  BP: (124-148)/(56-76) 144/68   Weight: 76 kg (167 lb 8.8 oz)  Body mass index is 24.74 kg/m².      Intake/Output Summary (Last 24 hours) at 02/27/18 0840  Last data filed at 02/27/18 0225   Gross per 24 hour   Intake              600 ml   Output             2075 ml   Net            -1475 ml       Physical Exam   Constitutional: He is oriented to person, place, and time. He appears well-developed. He has a sickly appearance. He appears ill. No distress.   HENT:   Head: Normocephalic and atraumatic.   No obvious cracked teeth. Fillings noted. Non oral ulcerations.    Eyes: EOM are normal. Pupils are equal, round, and reactive to light. Right eye exhibits no discharge. Left eye exhibits no discharge. Right conjunctiva has a hemorrhage. No scleral icterus.   Neck: Normal range of motion. Neck supple. No JVD present. No tracheal deviation present. No thyromegaly present.   Cardiovascular: Normal rate, regular rhythm, S1 normal and S2 normal.    Murmur heard.   Systolic murmur is present with a grade of 3/6   Pulses:       Radial pulses are 2+ on the right side, and 2+ on the left side.        Dorsalis pedis pulses are 2+ on the right side, and 2+ on the left side.   Warm extremities, no peripheral edema   Pulmonary/Chest: No accessory muscle usage. No tachypnea. No respiratory distress. He has no decreased breath sounds. He has no wheezes. He has no rales.   Abdominal: Soft. Bowel sounds are normal. He exhibits no distension. There is no guarding.    Lymphadenopathy:     He has no cervical adenopathy.   Neurological: He is alert and oriented to person, place, and time. GCS eye subscore is 4. GCS verbal subscore is 5. GCS motor subscore is 6.   Following all commands. Moving all extremities spontaneously and symmetrically    Skin: Skin is dry. Capillary refill takes less than 2 seconds. He is not diaphoretic. There is pallor.   No noted sores, skin breakdown.        Vents:  Oxygen Concentration (%): 40 (02/27/18 0800)  Lines/Drains/Airways     Drain                 Urethral Catheter 02/15/18 16 Fr. 12 days          Peripheral Intravenous Line                 Peripheral IV - Single Lumen 02/21/18 2120 Right Upper Arm 5 days         Peripheral IV - Single Lumen 02/25/18 0827 Left Antecubital 2 days              Significant Labs:    CBC/Anemia Profile:    Recent Labs  Lab 02/26/18  0315 02/27/18  0409   WBC 10.32 7.63   HGB 8.3* 7.4*   HCT 25.1* 22.8*    177   MCV 83 84   RDW 19.4* 19.4*        Chemistries:    Recent Labs  Lab 02/25/18  0846  02/26/18  0315 02/26/18  1112 02/26/18  1600 02/27/18  0409   *  < > 139 137 139  --    K 5.2*  < > 5.4* 5.0 4.4  --      < > 108 106 107  --    CO2 19*  < > 18* 21* 20*  --    BUN 22  < > 24* 27* 28*  --    CREATININE 1.6*  < > 1.4 1.3 1.4  --    CALCIUM 8.8  < > 9.1 9.1 9.3  --    ALBUMIN 2.5*  < > 2.4* 2.3* 2.4* 2.3*   PROT  --   --  6.7  --   --  6.3   BILITOT  --   --  1.5*  --   --  1.4*   ALKPHOS  --   --  76  --   --  65   ALT  --   --  32  --   --  29   AST  --   --  41*  --   --  31   MG 1.9  --  1.8  --   --  2.0   PHOS 3.7  3.7  < > 4.6* 4.8* 4.7* 4.9*   < > = values in this interval not displayed.      Significant Imaging:  I have reviewed and interpreted all pertinent imaging results/findings within the past 24 hours.    Assessment/Plan:     Psychiatric   Anxiety    --holding home alprazolam given tenuous respiratory status. No reports of anxiety        Pulmonary   * Acute hypoxemic  respiratory failure    --suspect related to pulmonary edema given acute worsening while admitted with >3 L crystalloids, PRBC combined volume during first days of admission. However, also covering for pneumonia  --has improved with diuresis and is net negative 11 L this admission. Oxygen requirements slowly improving; continue to wean HFNC as tolerated for goal saturations of 90%  --Continue empiric coverage with Vancomycin, Unasyn, and Azithromycin for now (started 2/25). Sputum culture from 2/25 not performed as inadequate specimen. Not currently with productive cough so hold on recollect  --concern for possible DAH previously given concurrent anemia with acute worsening hypoxemia/bilateral infiltrates. However, now with scant amount of hemoptysis. MAGNO negative. Awaiting ANCA, glomerular basement membrane antibody          Pulmonary nodules    --unclear etiology; inflammation, infection, vs other  --non-smoker, h/o occupational asbestos exposure  --repeat CT in 3-6 months            Cardiac/Vascular   HTN (hypertension)    --continue home amlodipine        Mitral valve vegetation    --appreciate cardiology's assistance. TTE with concern for mitral valve vegetation; also with noted moderate MR  --Will plan for SOWMYA today vs tomorrow if able to wean oxygen requirements down to <10 L   --blood culture from 2/25 now with GPC's; awaiting speciation. Have repeated cultures and continuing vancomycin           Renal/   ANNA (acute kidney injury)    --previously attributed to bactrim (started for proteus UTI), now d/c'd; baseline creatinine ~ 1.1  --Renal US with evidence of medical renal disease  --creatinine slowly improving  --will discuss continuing diuresis as tolerated.         Oncology   Anemia    --no evidence of ongoing GIB, previous EGD and C scope without source of bleeding. Minimal amounts of hemoptysis currently.   --hemolytic labs negative. Inappropriately low reticulocytes  --s/p 2 units this admission (on  2/22 and 2/23). Continue to monitor and transfuse for Hgb <7              I spent >70 minutes reviewing patient records, examining, and counseling the patient with greater than 50% of the time spent with direct patient care and coordination.          Kenia Mendosa, NP  Critical Care Medicine  [facility]

## 2018-02-27 NOTE — SUBJECTIVE & OBJECTIVE
Interval History: Oxygen requirements increased to 20 L @ 40% O2 HFNC yesterday evening and overnight, reduced back to 15 L this AM. Still having difficulty reducing oxygenation requirements. Otherwise no new complaints.    Review of Systems   Constitution: Negative for chills, fever and weight loss.   HENT: Negative for sore throat and stridor.    Eyes: Negative for blurred vision, double vision and pain.   Cardiovascular: Positive for dyspnea on exertion and palpitations. Negative for chest pain, claudication, near-syncope, orthopnea, paroxysmal nocturnal dyspnea and syncope.   Respiratory: Positive for cough and shortness of breath. Negative for sputum production and wheezing.    Endocrine: Negative for polydipsia, polyphagia and polyuria.   Skin: Negative for rash.   Musculoskeletal: Negative for joint pain, joint swelling and myalgias.   Gastrointestinal: Negative for abdominal pain, constipation, diarrhea, heartburn, melena, nausea and vomiting.   Genitourinary: Negative for dysuria and hematuria.   Neurological: Negative for focal weakness and loss of balance.   Psychiatric/Behavioral: Negative for depression and memory loss.     Objective:     Vital Signs (Most Recent):  Temp: 99.2 °F (37.3 °C) (02/27/18 0758)  Pulse: (!) 111 (02/27/18 1113)  Resp: (!) 31 (02/27/18 1113)  BP: (!) 148/65 (02/27/18 0900)  SpO2: (!) 93 % (02/27/18 1113) Vital Signs (24h Range):  Temp:  [98.6 °F (37 °C)-99.2 °F (37.3 °C)] 99.2 °F (37.3 °C)  Pulse:  [107-118] 111  Resp:  [16-45] 31  SpO2:  [87 %-99 %] 93 %  BP: (124-148)/(56-76) 148/65     Weight: 76 kg (167 lb 8.8 oz)  Body mass index is 24.74 kg/m².     SpO2: (!) 93 %  O2 Device (Oxygen Therapy): nasal cannula    Intake/Output Summary (Last 24 hours) at 02/27/18 1153  Last data filed at 02/27/18 0951   Gross per 24 hour   Intake              935 ml   Output             2050 ml   Net            -1115 ml     Lines/Drains/Airways     Drain                 Urethral Catheter  02/15/18 16 Fr. 12 days          Peripheral Intravenous Line                 Peripheral IV - Single Lumen 02/21/18 2120 Right Upper Arm 5 days         Peripheral IV - Single Lumen 02/25/18 0827 Left Antecubital 2 days              Physical Exam   Constitutional: He is oriented to person, place, and time. He appears well-developed and well-nourished. He appears ill.   HENT:   Head: Normocephalic and atraumatic.   Eyes: EOM are normal. No scleral icterus.   Right eye conjunctival redness.    Neck: Neck supple.   Cardiovascular: Regular rhythm and intact distal pulses.    Murmur (systolic at the mitral area. ) heard.  Pulmonary/Chest: He is in respiratory distress. He has no wheezes. He has no rales.   On 15L @ 40% O2 HFNC   Musculoskeletal: He exhibits no edema.   Neurological: He is alert and oriented to person, place, and time.   Skin: Skin is warm and dry. He is not diaphoretic. There is pallor.   No splinter hemorrhage or clubbing.    Psychiatric: He has a normal mood and affect.     Significant Labs:   CMP   Recent Labs  Lab 02/26/18  0315 02/26/18  1112 02/26/18  1600 02/27/18  0409    137 139  --    K 5.4* 5.0 4.4  --     106 107  --    CO2 18* 21* 20*  --    GLU 96 130* 108  --    BUN 24* 27* 28*  --    CREATININE 1.4 1.3 1.4  --    CALCIUM 9.1 9.1 9.3  --    PROT 6.7  --   --  6.3   ALBUMIN 2.4* 2.3* 2.4* 2.3*   BILITOT 1.5*  --   --  1.4*   ALKPHOS 76  --   --  65   AST 41*  --   --  31   ALT 32  --   --  29   ANIONGAP 13 10 12  --    ESTGFRAFRICA 59.7* >60.0 59.7*  --    EGFRNONAA 51.6* 56.5* 51.6*  --     and CBC   Recent Labs  Lab 02/26/18  0315 02/27/18  0409   WBC 10.32 7.63   HGB 8.3* 7.4*   HCT 25.1* 22.8*    177       Significant Imaging: Echocardiogram:   2D echo with color flow doppler:   Results for orders placed or performed during the hospital encounter of 02/21/18   2D echo with color flow doppler   Result Value Ref Range    EF 68 55 - 65    Mitral Valve Regurgitation  MODERATE (A)     Est. PA Systolic Pressure 51.56 (A)     Mitral Valve Mobility SYSTOLIC FLATTENING     Tricuspid Valve Regurgitation MILD

## 2018-02-28 PROBLEM — I05.9 ENDOCARDITIS OF MITRAL VALVE: Status: ACTIVE | Noted: 2018-02-28

## 2018-02-28 PROBLEM — I35.8 AORTIC VALVE ENDOCARDITIS: Status: ACTIVE | Noted: 2018-02-28

## 2018-02-28 PROBLEM — I05.9 ENDOCARDITIS OF MITRAL VALVE: Status: ACTIVE | Noted: 2018-02-26

## 2018-02-28 LAB
ALBUMIN SERPL BCP-MCNC: 2.3 G/DL
ALBUMIN SERPL BCP-MCNC: 2.3 G/DL
ALBUMIN SERPL BCP-MCNC: 2.4 G/DL
ALBUMIN SERPL BCP-MCNC: 2.4 G/DL
ALP SERPL-CCNC: 74 U/L
ALT SERPL W/O P-5'-P-CCNC: 28 U/L
ANION GAP SERPL CALC-SCNC: 12 MMOL/L
ANION GAP SERPL CALC-SCNC: 7 MMOL/L
ANION GAP SERPL CALC-SCNC: 7 MMOL/L
AST SERPL-CCNC: 31 U/L
BASOPHILS # BLD AUTO: 0.01 K/UL
BASOPHILS NFR BLD: 0.1 %
BILIRUB DIRECT SERPL-MCNC: 1 MG/DL
BILIRUB SERPL-MCNC: 1.6 MG/DL
BUN SERPL-MCNC: 31 MG/DL
BUN SERPL-MCNC: 32 MG/DL
BUN SERPL-MCNC: 34 MG/DL
CALCIUM SERPL-MCNC: 8.9 MG/DL
CALCIUM SERPL-MCNC: 9 MG/DL
CALCIUM SERPL-MCNC: 9 MG/DL
CHLORIDE SERPL-SCNC: 105 MMOL/L
CHLORIDE SERPL-SCNC: 106 MMOL/L
CHLORIDE SERPL-SCNC: 106 MMOL/L
CO2 SERPL-SCNC: 24 MMOL/L
CO2 SERPL-SCNC: 28 MMOL/L
CO2 SERPL-SCNC: 29 MMOL/L
CREAT SERPL-MCNC: 1.2 MG/DL
CREAT SERPL-MCNC: 1.4 MG/DL
CREAT SERPL-MCNC: 1.5 MG/DL
DIFFERENTIAL METHOD: ABNORMAL
EOSINOPHIL # BLD AUTO: 0.5 K/UL
EOSINOPHIL NFR BLD: 6.1 %
ERYTHROCYTE [DISTWIDTH] IN BLOOD BY AUTOMATED COUNT: 19.7 %
EST. GFR  (AFRICAN AMERICAN): 54.9 ML/MIN/1.73 M^2
EST. GFR  (AFRICAN AMERICAN): 59.7 ML/MIN/1.73 M^2
EST. GFR  (AFRICAN AMERICAN): >60 ML/MIN/1.73 M^2
EST. GFR  (NON AFRICAN AMERICAN): 47.5 ML/MIN/1.73 M^2
EST. GFR  (NON AFRICAN AMERICAN): 51.6 ML/MIN/1.73 M^2
EST. GFR  (NON AFRICAN AMERICAN): >60 ML/MIN/1.73 M^2
GLUCOSE SERPL-MCNC: 139 MG/DL
GLUCOSE SERPL-MCNC: 146 MG/DL
GLUCOSE SERPL-MCNC: 148 MG/DL
HCT VFR BLD AUTO: 23.6 %
HGB BLD-MCNC: 7.6 G/DL
IMM GRANULOCYTES # BLD AUTO: 0.04 K/UL
IMM GRANULOCYTES NFR BLD AUTO: 0.5 %
LYMPHOCYTES # BLD AUTO: 0.5 K/UL
LYMPHOCYTES NFR BLD: 6.1 %
MAGNESIUM SERPL-MCNC: 2 MG/DL
MAGNESIUM SERPL-MCNC: 2 MG/DL
MCH RBC QN AUTO: 26.6 PG
MCHC RBC AUTO-ENTMCNC: 32.2 G/DL
MCV RBC AUTO: 83 FL
MONOCYTES # BLD AUTO: 0.6 K/UL
MONOCYTES NFR BLD: 7.1 %
NEUTROPHILS # BLD AUTO: 6.7 K/UL
NEUTROPHILS NFR BLD: 80.1 %
NRBC BLD-RTO: 0 /100 WBC
PHOSPHATE SERPL-MCNC: 3.5 MG/DL
PHOSPHATE SERPL-MCNC: 3.5 MG/DL
PHOSPHATE SERPL-MCNC: 4.3 MG/DL
PLATELET # BLD AUTO: 180 K/UL
PMV BLD AUTO: 10.4 FL
POTASSIUM SERPL-SCNC: 3.9 MMOL/L
POTASSIUM SERPL-SCNC: 3.9 MMOL/L
POTASSIUM SERPL-SCNC: 4.2 MMOL/L
PROT SERPL-MCNC: 6.7 G/DL
RBC # BLD AUTO: 2.86 M/UL
SODIUM SERPL-SCNC: 140 MMOL/L
SODIUM SERPL-SCNC: 142 MMOL/L
SODIUM SERPL-SCNC: 142 MMOL/L
WBC # BLD AUTO: 8.36 K/UL

## 2018-02-28 PROCEDURE — 83735 ASSAY OF MAGNESIUM: CPT

## 2018-02-28 PROCEDURE — 99222 1ST HOSP IP/OBS MODERATE 55: CPT | Mod: GC,,, | Performed by: INTERNAL MEDICINE

## 2018-02-28 PROCEDURE — 80069 RENAL FUNCTION PANEL: CPT

## 2018-02-28 PROCEDURE — 27100092 HC HIGH FLOW DELIVERY CANNULA

## 2018-02-28 PROCEDURE — 25000003 PHARM REV CODE 250: Performed by: NURSE PRACTITIONER

## 2018-02-28 PROCEDURE — 25000003 PHARM REV CODE 250: Performed by: INTERNAL MEDICINE

## 2018-02-28 PROCEDURE — 85025 COMPLETE CBC W/AUTO DIFF WBC: CPT

## 2018-02-28 PROCEDURE — 36415 COLL VENOUS BLD VENIPUNCTURE: CPT

## 2018-02-28 PROCEDURE — 63600175 PHARM REV CODE 636 W HCPCS: Performed by: NURSE PRACTITIONER

## 2018-02-28 PROCEDURE — 27100171 HC OXYGEN HIGH FLOW UP TO 24 HOURS

## 2018-02-28 PROCEDURE — 63600175 PHARM REV CODE 636 W HCPCS: Performed by: INTERNAL MEDICINE

## 2018-02-28 PROCEDURE — 63600175 PHARM REV CODE 636 W HCPCS: Performed by: STUDENT IN AN ORGANIZED HEALTH CARE EDUCATION/TRAINING PROGRAM

## 2018-02-28 PROCEDURE — 99233 SBSQ HOSP IP/OBS HIGH 50: CPT | Mod: ,,, | Performed by: NURSE PRACTITIONER

## 2018-02-28 PROCEDURE — 99232 SBSQ HOSP IP/OBS MODERATE 35: CPT | Mod: ,,, | Performed by: INTERNAL MEDICINE

## 2018-02-28 PROCEDURE — 25000003 PHARM REV CODE 250: Performed by: HOSPITALIST

## 2018-02-28 PROCEDURE — 27000221 HC OXYGEN, UP TO 24 HOURS

## 2018-02-28 PROCEDURE — 97165 OT EVAL LOW COMPLEX 30 MIN: CPT

## 2018-02-28 PROCEDURE — 63600175 PHARM REV CODE 636 W HCPCS: Performed by: HOSPITALIST

## 2018-02-28 PROCEDURE — 25000003 PHARM REV CODE 250: Performed by: STUDENT IN AN ORGANIZED HEALTH CARE EDUCATION/TRAINING PROGRAM

## 2018-02-28 PROCEDURE — 84075 ASSAY ALKALINE PHOSPHATASE: CPT

## 2018-02-28 PROCEDURE — 11000001 HC ACUTE MED/SURG PRIVATE ROOM

## 2018-02-28 PROCEDURE — 80069 RENAL FUNCTION PANEL: CPT | Mod: 91

## 2018-02-28 PROCEDURE — 97535 SELF CARE MNGMENT TRAINING: CPT

## 2018-02-28 RX ORDER — FUROSEMIDE 10 MG/ML
40 INJECTION INTRAMUSCULAR; INTRAVENOUS 2 TIMES DAILY
Status: DISCONTINUED | OUTPATIENT
Start: 2018-02-28 | End: 2018-03-06

## 2018-02-28 RX ADMIN — VANCOMYCIN HYDROCHLORIDE 1250 MG: 1 INJECTION, POWDER, LYOPHILIZED, FOR SOLUTION INTRAVENOUS at 10:02

## 2018-02-28 RX ADMIN — FLUTICASONE PROPIONATE 50 MCG: 50 SPRAY, METERED NASAL at 08:02

## 2018-02-28 RX ADMIN — SODIUM CHLORIDE 3 G: 9 INJECTION, SOLUTION INTRAVENOUS at 03:02

## 2018-02-28 RX ADMIN — AMPICILLIN SODIUM AND SULBACTAM SODIUM 3 G: 2; 1 INJECTION, POWDER, FOR SOLUTION INTRAMUSCULAR; INTRAVENOUS at 05:02

## 2018-02-28 RX ADMIN — OXYCODONE HYDROCHLORIDE 5 MG: 5 TABLET ORAL at 10:02

## 2018-02-28 RX ADMIN — MIRTAZAPINE 30 MG: 15 TABLET, FILM COATED ORAL at 08:02

## 2018-02-28 RX ADMIN — AMLODIPINE BESYLATE 10 MG: 10 TABLET ORAL at 08:02

## 2018-02-28 RX ADMIN — BENZONATATE 100 MG: 100 CAPSULE ORAL at 10:02

## 2018-02-28 RX ADMIN — FUROSEMIDE 40 MG: 10 INJECTION, SOLUTION INTRAMUSCULAR; INTRAVENOUS at 08:02

## 2018-02-28 RX ADMIN — AZITHROMYCIN 500 MG: 500 INJECTION, POWDER, LYOPHILIZED, FOR SOLUTION INTRAVENOUS at 08:02

## 2018-02-28 RX ADMIN — ENOXAPARIN SODIUM 40 MG: 100 INJECTION SUBCUTANEOUS at 05:02

## 2018-02-28 RX ADMIN — FUROSEMIDE 5 MG/HR: 10 INJECTION, SOLUTION INTRAVENOUS at 04:02

## 2018-02-28 RX ADMIN — Medication 1 SPRAY: at 08:02

## 2018-02-28 RX ADMIN — SODIUM CHLORIDE 3 G: 9 INJECTION, SOLUTION INTRAVENOUS at 09:02

## 2018-02-28 RX ADMIN — TAMSULOSIN HYDROCHLORIDE 0.4 MG: 0.4 CAPSULE ORAL at 08:02

## 2018-02-28 NOTE — PLAN OF CARE
Problem: Occupational Therapy Goal  Goal: Occupational Therapy Goal  Goals to be met by: 3/10/2018    Patient will increase functional independence with ADLs by performing:    UE Dressing with Modified [place].  LE Dressing with Modified [place].  Grooming while standing with Modified [place].  Toileting from toilet with Modified [place] for hygiene and clothing management.   Bathing from  edge of bed with Modified [place].    Outcome: Ongoing (interventions implemented as appropriate)  Goals remain appropriate, continue with OT POC.    Recommend HHOT upon d/c    MILTON Joshi  2/28/2018  Rehab Services

## 2018-02-28 NOTE — ASSESSMENT & PLAN NOTE
66 yo male with HTN who presented with symptomatic anemia. Had a respiratory decompensation on floor and transferred to ICU. SOWMYA showed aortic and mitral valve vegetations suggestive of bacterial endocarditis.     - Blood culture from 2/25 growing gram positive cocci in 1/4 bottles. Low growth likely related to previous antibiotic exposure  - Continue Vanc and Unasyn while culture is pending  - Can stop azithromycin as it is appears respiratory decompensation more related to heart than pulmonary infection   - Repeat blood cultures every other day until they clear

## 2018-02-28 NOTE — PROGRESS NOTES
[facility]  Critical Care Medicine  Progress Note    Patient Name: Rodolfo Messina  MRN: [id number]  Admission Date: 2/21/2018  Hospital Length of Stay: 7 days  Code Status: Full Code  Attending Provider: Graham Meehan MD  Primary Care Provider: Deric Claudio MD   Principal Problem: Acute hypoxemic respiratory failure    Subjective:     HPI:  Mr. Messina is a 67 y.o. male with significant past medical history of HTN and essential tremor, recently with urinary retention requiring indwelling lackey presented to hospital for symptomatic anemia. The patient was recently admitted 1/27/18 - 01/28/18 for symptomatic anemia associated with fatigue. He received one unit blood transfusion with improved Hgb from 7 to 8.5. Follow up EGD and colonoscopy on 02/01/18 showed non bleeding erosive gastropathy, internal hemorrhoid and diverticulosis in sigmoid and descending colon. The patient had routine labs done by his PCP which revealed hgb 7.1. The patient's PCP called him and told him to report to the Emergency Department for further workup.     The patient endorsed a non-productive cough since December, with progression to green productive sputum x3 days and scant blood tinged sputum upon admission. Prior to this hospitalization the patient reports having fevers that started around [place] time that would be intermittent (several times a week) and low grade. He did have upper respiratory/viral symptoms around [place] time and received IM steroids and a Z pack. Fevers, malaise, anorexia, and 10 pound weight loss occurred since then. He denies any orthopnea, dyspnea on exertion, chest pain, palpitations. He denies recent cracked teeth, dental carries, oral ulcerations, or skin breakdown other than a cat scratch that occurred about 10 days prior to admission (now resolved).     Work up in the ED revealed a hgb of 6.7, ANNA with creatinine 2 (baseline ~ 1.1). UA was negative for infection. Stool was  neg for occult blood.  The patient was admitted to Hospital Medicine for further management. CTA chest revealed multiple pulmonary nodules for which Pulmonology was consulted (patient has occupational asbestos exposure) and recommended adding duo nebs and outpatient follow up. The patient has received 2 units PRBCs during this hospitalization from 2/22 to the night of 2/23 along with about 3 L crystalloids during that time. He then developed acute hypoxemia overnight 2/23 into 2/24 (87% on RA) and required 2-3L NC. Follow up chest xray on 2/24 revealed interval development of bilateral patchy interstitial and parietal attenuation reflective of edema vs infection. The patient was given a x1 dose of lasix 20mg and maintenance IVF were discontinued. The patient developed worsening infiltrates and increasing oxygen requirements, requiring comfort flow 30L 80% to maintain oxygen levels. The patient was moved to MICU for intubation watch.        Hospital/ICU Course:  Mr. Messina was admitted to the ICU with acute hypoxemic respiratory failure requiring comfort flow.  Antibiotics were broadened to (vancomycin + unasyn + azithromycin). He was also given lasix for diuresis with good response.  Oxygen requirements are slowing decreasing and creatinine is improving. Vasculitis workup pending given concern for DAH with worsening anemia without signs of acute blood loss other than scant hemoptysis. 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 CTS and ID consulted. One blood culture from 2/25 resulted with GPC's.     Interval History/Significant Events:  SOWMYA with mitral and aortic endocarditis, severe MR and moderate AI. Continues to diurese well. Weaning oxygen. No complaints this AM     Review of Systems   Constitutional: Negative for chills and fever.   HENT: Negative for nosebleeds, sore throat and trouble swallowing.    Respiratory:  Positive for cough (improving. no hemoptysis ). Negative for chest tightness, shortness of breath and wheezing.    Cardiovascular: Negative for chest pain and palpitations.   Gastrointestinal: Negative for abdominal distention, abdominal pain, blood in stool, diarrhea, nausea and vomiting.   Genitourinary: Negative for dysuria, frequency, hematuria and urgency.   Neurological: Positive for tremors. Negative for speech difficulty, weakness, light-headedness and headaches.     Objective:     Vital Signs (Most Recent):  Temp: 99.5 °F (37.5 °C) (02/28/18 0300)  Pulse: 110 (02/28/18 0800)  Resp: (!) 22 (02/28/18 0800)  BP: 137/66 (02/28/18 0800)  SpO2: (!) 94 % (02/28/18 0800) Vital Signs (24h Range):  Temp:  [98.7 °F (37.1 °C)-99.5 °F (37.5 °C)] 99.5 °F (37.5 °C)  Pulse:  [108-116] 110  Resp:  [] 22  SpO2:  [86 %-98 %] 94 %  BP: ()/(57-74) 137/66   Weight: 76 kg (167 lb 8.8 oz)  Body mass index is 24.74 kg/m².      Intake/Output Summary (Last 24 hours) at 02/28/18 0908  Last data filed at 02/28/18 0800   Gross per 24 hour   Intake           303.28 ml   Output             3250 ml   Net         -2946.72 ml       Physical Exam   Constitutional: He is oriented to person, place, and time. He appears well-developed. He has a sickly appearance. He appears ill. No distress.   HENT:   Head: Normocephalic and atraumatic.   No obvious cracked teeth. Fillings noted. Non oral ulcerations.    Eyes: EOM are normal. Pupils are equal, round, and reactive to light. Right eye exhibits no discharge. Left eye exhibits no discharge. Right conjunctiva has a hemorrhage. No scleral icterus.   Neck: Normal range of motion. Neck supple. No JVD present. No tracheal deviation present. No thyromegaly present.   Cardiovascular: Normal rate, regular rhythm, S1 normal and S2 normal.    Murmur heard.   Systolic murmur is present with a grade of 3/6   Pulses:       Radial pulses are 2+ on the right side, and 2+ on the left side.         Dorsalis pedis pulses are 2+ on the right side, and 2+ on the left side.   Warm extremities, no peripheral edema   Pulmonary/Chest: No accessory muscle usage. No tachypnea. No respiratory distress. He has no decreased breath sounds. He has no wheezes. He has no rales.   Abdominal: Soft. Bowel sounds are normal. He exhibits no distension. There is no guarding.   Lymphadenopathy:     He has no cervical adenopathy.   Neurological: He is alert and oriented to person, place, and time. GCS eye subscore is 4. GCS verbal subscore is 5. GCS motor subscore is 6.   Following all commands. Moving all extremities spontaneously and symmetrically    Skin: Skin is dry. Capillary refill takes less than 2 seconds. He is not diaphoretic. There is pallor.   No noted sores, skin breakdown. No splinter hemorrages        Vents:  Oxygen Concentration (%): 50 (02/28/18 0727)  Lines/Drains/Airways     Drain                 Urethral Catheter 02/15/18 16 Fr. 13 days          Peripheral Intravenous Line                 Peripheral IV - Single Lumen 02/25/18 0827 Left Antecubital 3 days         Peripheral IV - Single Lumen 02/27/18 0515 Right Forearm 1 day              Significant Labs:    CBC/Anemia Profile:    Recent Labs  Lab 02/27/18  0409 02/28/18  0331   WBC 7.63 8.36   HGB 7.4* 7.6*   HCT 22.8* 23.6*    180   MCV 84 83   RDW 19.4* 19.7*        Chemistries:    Recent Labs  Lab 02/26/18  1112 02/26/18  1600 02/27/18  0409 02/28/18  0331    139  --  142   K 5.0 4.4  --  4.2    107  --  106   CO2 21* 20*  --  24   BUN 27* 28*  --  32*   CREATININE 1.3 1.4  --  1.5*   CALCIUM 9.1 9.3  --  9.0   ALBUMIN 2.3* 2.4* 2.3* 2.4*  2.4*   PROT  --   --  6.3 6.7   BILITOT  --   --  1.4* 1.6*   ALKPHOS  --   --  65 74   ALT  --   --  29 28   AST  --   --  31 31   MG  --   --  2.0 2.0   PHOS 4.8* 4.7* 4.9* 4.3         Significant Imaging:  I have reviewed and interpreted all pertinent imaging results/findings within the past 24  hours.    Assessment/Plan:     Psychiatric   Anxiety    --holding home alprazolam given tenuous respiratory status. No reports of anxiety        Pulmonary   * Acute hypoxemic respiratory failure    --2/2 cardiogenic pulmonary edema given endocarditis with moderate/severe MR and AI in the setting of having received >3 L crystalloids, 2 units PRBC's during first days of admission.  --continuing to diurese and oxygen requirements slowly improving; continue to wean HFNC as tolerated for goal saturations of 90%  --Continue empiric coverage with Vancomycin, Unasyn, and Azithromycin for now (started 2/25) but can likely de-escalate azithro. Sputum culture from 2/25 not performed as inadequate specimen. Not currently with productive cough so hold on recollect  --concern for possible DAH previously given concurrent anemia with acute worsening hypoxemia/bilateral infiltrates. However, now with scant amount of hemoptysis. MAGNO negative. Awaiting ANCA, glomerular basement membrane antibody          Pulmonary nodules    --unclear etiology; inflammation, infection, vs other  --non-smoker, h/o occupational asbestos exposure  --repeat CT in 3-6 months            Cardiac/Vascular   HTN (hypertension)    --continue home amlodipine        Acute cardiogenic pulmonary edema    --2/2 MR and AI. See above        Subacute infective endocarditis    --appreciate cardiology's and CTS assistance. See above for plan         Aortic valve endocarditis    --noted on SOWMYA 2/27. Along with mitral valve endocarditis and associated AI, MR.  --one blood culture from 2/25 with GPC's awaiting speciation. Appreciate ID assistance. Will continue Vanco, Unasyn for now until speciation/sensitivity. Repeat cultures from 2/27 NGTD. Afebrile, without leukocytosis  --CTS consulted and recommending finishing Abx therapy, optimize volume status and follow up with CTS when stable.   --unclear initial source of infection         Mitral and aortic valve regurgitation     --2/2 endocarditis. See below  --optimize volume status as tolerated         Renal/   ANNA (acute kidney injury)    --previously attributed to bactrim (started for proteus UTI), now d/c'd; baseline creatinine ~ 1.1  --Renal US with evidence of medical renal disease  --creatinine slowly improving with diuresis. Continue to trend         Oncology   Anemia    --no evidence of ongoing GIB, previous EGD and C scope without source of bleeding. Minimal amounts of hemoptysis currently.   --hemolytic labs negative. Inappropriately low reticulocytes  --s/p 2 units this admission (on 2/22 and 2/23). Continue to monitor and transfuse for Hgb <7            I spent >70 minutes reviewing patient records, examining, and counseling the patient with greater than 50% of the time spent with direct patient care and coordination.      Kenia Mendosa, NP  Critical Care Medicine  [facility]

## 2018-02-28 NOTE — ASSESSMENT & PLAN NOTE
--2/2 cardiogenic pulmonary edema given endocarditis with moderate/severe MR and AI in the setting of having received >3 L crystalloids, 2 units PRBC's during first days of admission.  --continuing to diurese and oxygen requirements slowly improving; continue to wean HFNC as tolerated for goal saturations of 90%  --Continue empiric coverage with Vancomycin, Unasyn, and Azithromycin for now (started 2/25) but can likely de-escalate azithro. Sputum culture from 2/25 not performed as inadequate specimen. Not currently with productive cough so hold on recollect  --concern for possible DAH previously given concurrent anemia with acute worsening hypoxemia/bilateral infiltrates. However, now with scant amount of hemoptysis. MAGNO negative. Awaiting ANCA, glomerular basement membrane antibody

## 2018-02-28 NOTE — ASSESSMENT & PLAN NOTE
--previously attributed to bactrim (started for proteus UTI), now d/c'd; baseline creatinine ~ 1.1  --Renal US with evidence of medical renal disease  --creatinine slowly improving with diuresis. Continue to trend

## 2018-02-28 NOTE — ASSESSMENT & PLAN NOTE
Infectious disease consultation has been placed. Please follow their recommendations. Anticipate 2 to 6 week of treatment needed to clear infection prior to aortic and mitral valve repair versus replacement. Ideal would be to improve heart failure symptoms to point he can discharge, continue improving exercise tolerance, and return for surgery. Dr Go to staff consultation.

## 2018-02-28 NOTE — ASSESSMENT & PLAN NOTE
--noted on SOWMYA 2/27. Along with mitral valve endocarditis and associated AI, MR.  --one blood culture from 2/25 with GPC's awaiting speciation. Appreciate ID assistance. Will continue Vanco, Unasyn for now until speciation/sensitivity. Repeat cultures from 2/27 NGTD. Afebrile, without leukocytosis  --CTS consulted and recommending finishing Abx therapy, optimize volume status and follow up with CTS when stable.   --unclear initial source of infection

## 2018-02-28 NOTE — ANESTHESIA POSTPROCEDURE EVALUATION
"Anesthesia Post Evaluation    Patient: Rodolfo Messina    Procedure(s) Performed: Procedure(s) (LRB):  TRANSESOPHAGEAL ECHOCARDIOGRAM (SOWMYA) (N/A)    Final Anesthesia Type: general  Patient location during evaluation: ICU  Patient participation: Yes- Able to Participate  Level of consciousness: awake  Pain management: adequate  Airway patency: patent  PONV status at discharge: No PONV  Anesthetic complications: no      Cardiovascular status: hemodynamically stable  Respiratory status: nasal cannula  Hydration status: euvolemic  Follow-up not needed.        Visit Vitals  /84 (BP Location: Left arm, Patient Position: Sitting)   Pulse (!) 113   Temp 37.5 °C (99.5 °F) (Oral)   Resp (!) 27   Ht 5' 9" (1.753 m)   Wt 76 kg (167 lb 8.8 oz)   SpO2 96%   BMI 24.74 kg/m²       Pain/Siva Score: Pain Assessment Performed: Yes (2/28/2018  5:00 AM)  Presence of Pain: denies (2/28/2018  5:00 AM)  Pain Rating Prior to Med Admin: 7 (2/27/2018  9:32 PM)  Pain Rating Post Med Admin: 0 (2/27/2018 10:32 PM)      "

## 2018-02-28 NOTE — SUBJECTIVE & OBJECTIVE
No current facility-administered medications on file prior to encounter.      Current Outpatient Prescriptions on File Prior to Encounter   Medication Sig    ALPRAZolam (XANAX) 0.25 MG tablet Take 1 tablet (0.25 mg total) by mouth nightly as needed for Insomnia.    amLODIPine (NORVASC) 10 MG tablet Take 1 tablet (10 mg total) by mouth once daily.    fluticasone (FLONASE) 50 mcg/actuation nasal spray 1 spray by Each Nare route 2 (two) times daily.    loratadine (CLARITIN) 10 mg tablet Take 10 mg by mouth once daily.    [] sulfamethoxazole-trimethoprim 800-160mg (BACTRIM DS) 800-160 mg Tab Take 1 tablet by mouth 2 (two) times daily.    tamsulosin (FLOMAX) 0.4 mg Cp24 Take 2 capsules (0.8 mg total) by mouth every evening.    VENTOLIN HFA 90 mcg/actuation inhaler     brompheniramine-pseudoeph-DM 2-30-10 mg/5 mL Syrp     CHERATUSSIN AC  mg/5 mL syrup Take 10 mLs by mouth every 6 (six) hours as needed.     mirtazapine (REMERON) 30 MG tablet TAKE 1 TABLET EVERY EVENING       Review of patient's allergies indicates:   Allergen Reactions    Augmentin [amoxicillin-pot clavulanate]      Patient felt that it raised BP and requested to have it added as intolerance. Tolerated unasyn    Ciprofloxacin     Flagyl [metronidazole]     Lisinopril Other (See Comments)     cough    Mysoline [primidone]     Omnicef [cefdinir]     Shellfish containing products        Past Medical History:   Diagnosis Date    ANNA (acute kidney injury) 2018    Anemia     Anxiety     BPH (benign prostatic hyperplasia)     Diverticulosis     Hypertension     Urinary retention      Past Surgical History:   Procedure Laterality Date    APPENDECTOMY      COLONOSCOPY      COLONOSCOPY N/A 2018    Procedure: COLONOSCOPY;  Surgeon: Richar Payan MD;  Location: [address]);  Service: Endoscopy;  Laterality: N/A;  PM prep    EYE SURGERY Right     cataract extraction    FINGER SURGERY      FRACTURE SURGERY  Right     index finger    TONSILLECTOMY       Family History     Problem Relation (Age of Onset)    Heart disease Mother, Father    No Known Problems Sister, Brother    Stroke Mother        Social History Main Topics    Smoking status: Never Smoker    Smokeless tobacco: Never Used    Alcohol use Yes      Comment: none recently    Drug use: No    Sexual activity: Not Currently     Partners: Female     Review of Systems   Constitutional: Positive for fatigue.   HENT: Negative.    Eyes: Negative.    Respiratory: Positive for cough and shortness of breath.    Cardiovascular: Negative.    Gastrointestinal: Negative.    Endocrine: Negative.    Genitourinary: Negative.    Musculoskeletal: Negative.    Skin: Negative.    Allergic/Immunologic: Negative.    Neurological: Positive for tremors.   Hematological: Negative.    Psychiatric/Behavioral: Negative.      Objective:     Vital Signs (Most Recent):  Temp: 99.2 °F (37.3 °C) (02/27/18 0758)  Pulse: 110 (02/27/18 1700)  Resp: 19 (02/27/18 1700)  BP: (!) 141/65 (02/27/18 1700)  SpO2: 98 % (02/27/18 1700) Vital Signs (24h Range):  Temp:  [99 °F (37.2 °C)-99.2 °F (37.3 °C)] 99.2 °F (37.3 °C)  Pulse:  [107-118] 110  Resp:  [18-36] 19  SpO2:  [87 %-98 %] 98 %  BP: ()/(56-73) 141/65     Weight: 76 kg (167 lb 8.8 oz)  Body mass index is 24.74 kg/m².    SpO2: 98 %  O2 Device (Oxygen Therapy): High Flow nasal Cannula     Intake/Output - Last 3 Shifts       02/25 0700 - 02/26 0659 02/26 0700 - 02/27 0659 02/27 0700 - 02/28 0659    P.O. 100      I.V. (mL/kg) 55 (0.7) 60 (0.8)     IV Piggyback 1250 900 450    Total Intake(mL/kg) 1405 (18.5) 960 (12.6) 450 (5.9)    Urine (mL/kg/hr) 2820 (1.5) 2225 (1.2) 1050 (1.1)    Stool   0 (0)    Total Output 2820 2225 1050    Net -1415 -1265 -600           Stool Occurrence   1 x           Lines/Drains/Airways     Drain                 Urethral Catheter 02/15/18 16 Fr. 12 days          Peripheral Intravenous Line                 Peripheral  IV - Single Lumen 02/21/18 2120 Right Upper Arm 5 days         Peripheral IV - Single Lumen 02/25/18 0827 Left Antecubital 2 days                 STS Risk Score: n/a for double valve surgery. Anticipate 5-15% expected mortality depending on urgent versus elective operation status.     Physical Exam   Constitutional: He is oriented to person, place, and time. He appears well-developed and well-nourished. He is not intubated.   HENT:   Head: Normocephalic and atraumatic.   Right Ear: External ear normal.   Left Ear: External ear normal.   Nose: Nose normal.   Mouth/Throat: Oropharynx is clear and moist.   Eyes: Conjunctivae and EOM are normal. Pupils are equal, round, and reactive to light.   Neck: Normal range of motion. Neck supple.   Cardiovascular: Normal rate and regular rhythm.    Pulmonary/Chest: No accessory muscle usage. Tachypnea noted. He is not intubated.   Abdominal: He exhibits no distension.   Musculoskeletal: Normal range of motion.   Neurological: He is alert and oriented to person, place, and time.   Skin: Skin is warm and dry. Capillary refill takes less than 2 seconds.   Vitals reviewed.      Significant Labs:  ABGs: No results for input(s): PH, PCO2, PO2, HCO3, POCSATURATED, BE in the last 48 hours.  BMP:   Recent Labs  Lab 02/26/18  1600 02/27/18  0409     --      --    K 4.4  --      --    CO2 20*  --    BUN 28*  --    CREATININE 1.4  --    CALCIUM 9.3  --    MG  --  2.0     LFTs:   Recent Labs  Lab 02/27/18  0409   ALT 29   AST 31   ALKPHOS 65   BILITOT 1.4*   PROT 6.3   ALBUMIN 2.3*     All pertinent labs from the last 24 hours have been reviewed.    Significant Diagnostics:  CT: I have reviewed all pertinent results/findings within the past 24 hours  CXR: I have reviewed all pertinent results/findings within the past 24 hours  Echo: I have reviewed all pertinent results/findings within the past 24 hours  EKG: I have reviewed all pertinent results/findings within the past 24  hours  SOWMYA: I have reviewed all pertinent results/findings within the past 24 hours

## 2018-02-28 NOTE — RESIDENT HANDOFF
ICU Transfer of Care Note  Critical Care Medicine    Admit Date: 2/21/2018  LOS: 7    CC: Acute hypoxemic respiratory failure    Code Status: Full Code     Transfer to Hospital Medicine discussed with Stefani at 16:45     HPI and Hospital Course:     HPI:  Mr. Messina is a 67 y.o. male with significant past medical history of HTN and essential tremor, recently with urinary retention requiring indwelling lackey presented to hospital for symptomatic anemia. The patient was recently admitted 1/27/18 - 01/28/18 for symptomatic anemia associated with fatigue. He received one unit blood transfusion with improved Hgb from 7 to 8.5. Follow up EGD and colonoscopy on 02/01/18 showed non bleeding erosive gastropathy, internal hemorrhoid and diverticulosis in sigmoid and descending colon. The patient had routine labs done by his PCP which revealed hgb 7.1. The patient's PCP called him and told him to report to the Emergency Department for further workup.     The patient endorsed a non-productive cough since December, with progression to green productive sputum x3 days and scant blood tinged sputum upon admission. Prior to this hospitalization the patient reports having fevers that started around [place] time that would be intermittent (several times a week) and low grade. He did have upper respiratory/viral symptoms around [place] time and received IM steroids and a Z pack. Fevers, malaise, anorexia, and 10 pound weight loss occurred since then. He denies any orthopnea, dyspnea on exertion, chest pain, palpitations. He denies recent cracked teeth, dental carries, oral ulcerations, or skin breakdown other than a cat scratch that occurred about 10 days prior to admission (now resolved).     Work up in the ED revealed a hgb of 6.7, ANNA with creatinine 2 (baseline ~ 1.1). UA was negative for infection. Stool was neg for occult blood.  The patient was admitted to Hospital Medicine for further management. CTA chest revealed  multiple pulmonary nodules for which Pulmonology was consulted (patient has occupational asbestos exposure) and recommended adding duo nebs and outpatient follow up. The patient has received 2 units PRBCs during this hospitalization from 2/22 to the night of 2/23 along with about 3 L crystalloids during that time. He then developed acute hypoxemia overnight 2/23 into 2/24 (87% on RA) and required 2-3L NC. Follow up chest xray on 2/24 revealed interval development of bilateral patchy interstitial and parietal attenuation reflective of edema vs infection. The patient was given a x1 dose of lasix 20mg and maintenance IVF were discontinued. The patient developed worsening infiltrates and increasing oxygen requirements, requiring comfort flow 30L 80% to maintain oxygen levels. The patient was moved to MICU for intubation watch.        Hospital/ICU Course:  Mr. Messina was admitted to the ICU with acute hypoxemic respiratory failure requiring comfort flow.  Antibiotics were broadened to (vancomycin + unasyn + azithromycin). He was also given lasix for diuresis with good response.  Oxygen requirements are slowing decreasing and creatinine is improving. Vasculitis workup pending given concern for DAH with worsening anemia without signs of acute blood loss other than scant hemoptysis. 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 CTS and ID consulted. One blood culture from 2/25 resulted with GPC's.       To Follow Up:   --Continue diuresis as tolerated by hemodynamics, renal function.  --follow up cultures, ID recommendations. Abx regimen  --CTS follow-up    Discharge Plan:     Pending.       Call with questions.

## 2018-02-28 NOTE — SUBJECTIVE & OBJECTIVE
Interval History: patient is weaned down on O2 to 5 L via comfort flow nc. He is feeling the same. SOWMYA showed both aortic and mitral valves vegetations.     ROS  Objective:     Vital Signs (Most Recent):  Temp: 99.2 °F (37.3 °C) (02/28/18 1100)  Pulse: (!) 111 (02/28/18 1300)  Resp: (!) 23 (02/28/18 1300)  BP: 128/68 (02/28/18 1300)  SpO2: (!) 94 % (02/28/18 1300) Vital Signs (24h Range):  Temp:  [98.7 °F (37.1 °C)-99.5 °F (37.5 °C)] 99.2 °F (37.3 °C)  Pulse:  [108-116] 111  Resp:  [] 23  SpO2:  [86 %-98 %] 94 %  BP: (125-151)/(57-84) 128/68     Weight: 76 kg (167 lb 8.8 oz)  Body mass index is 24.74 kg/m².     SpO2: (!) 94 %  O2 Device (Oxygen Therapy): nasal cannula w/ humidification      Intake/Output Summary (Last 24 hours) at 02/28/18 1334  Last data filed at 02/28/18 1200   Gross per 24 hour   Intake          1013.28 ml   Output             3375 ml   Net         -2361.72 ml       Lines/Drains/Airways     Drain                 Urethral Catheter 02/15/18 16 Fr. 13 days          Peripheral Intravenous Line                 Peripheral IV - Single Lumen 02/25/18 0827 Left Antecubital 3 days         Peripheral IV - Single Lumen 02/27/18 0515 Right Forearm 1 day                Physical Exam   Constitutional: He is oriented to person, place, and time. He appears well-developed and well-nourished. He appears ill.   HENT:   Head: Normocephalic and atraumatic.   Eyes: EOM are normal. No scleral icterus.   Right eye conjunctival redness.    Neck: Neck supple.   Cardiovascular: Regular rhythm and intact distal pulses.    Murmur (systolic at the mitral area. ) heard.  Pulmonary/Chest: He is in respiratory distress. He has no wheezes. He has no rales.   On 5 L comfort flow nc.    Musculoskeletal: He exhibits no edema.   Neurological: He is alert and oriented to person, place, and time.   Skin: Skin is warm and dry. He is not diaphoretic. There is pallor.   No splinter hemorrhage or clubbing.    Psychiatric: He has a  normal mood and affect.       Significant Labs:   CMP   Recent Labs  Lab 02/26/18  1600 02/27/18  0409 02/28/18  0331     --  142   K 4.4  --  4.2     --  106   CO2 20*  --  24     --  148*   BUN 28*  --  32*   CREATININE 1.4  --  1.5*   CALCIUM 9.3  --  9.0   PROT  --  6.3 6.7   ALBUMIN 2.4* 2.3* 2.4*  2.4*   BILITOT  --  1.4* 1.6*   ALKPHOS  --  65 74   AST  --  31 31   ALT  --  29 28   ANIONGAP 12  --  12   ESTGFRAFRICA 59.7*  --  54.9*   EGFRNONAA 51.6*  --  47.5*    and CBC   Recent Labs  Lab 02/27/18  0409 02/28/18  0331   WBC 7.63 8.36   HGB 7.4* 7.6*   HCT 22.8* 23.6*    180       Significant Imaging: Echocardiogram: SOWMYA shwoed both aortic and mitral valves vegetations.

## 2018-02-28 NOTE — ASSESSMENT & PLAN NOTE
Limits adjuncts for diuresis as balloon pump will be ineffective.     SOWMYA:    1 - No visualized thrombus in the left atrial appendage.     2 - Left atrium is enlarged.     3 - Normal left ventricular systolic function (EF 65-70%).     4 - Moderate to severe aortic regurgitation.     5 - Severe mitral regurgitation.     6 - Echodensity visualized on the aortic valve consistent with vegetation ( see text).     7 - Echodensity visualized on the mitral valve consistent with vegetation ( see text).     8 - Grade 2 atheroma disease of aorta.

## 2018-02-28 NOTE — PROGRESS NOTES
[facility]  Cardiology  Progress Note    Patient Name: Rodolfo Messina  MRN: [id number]  Admission Date: 2/21/2018  Hospital Length of Stay: 7 days  Code Status: Full Code   Attending Physician: Graham Meehan MD   Primary Care Physician: Deric Claudio MD  Expected Discharge Date:   Principal Problem:Acute hypoxemic respiratory failure    Subjective:     Hospital Course:   In the ED his hgb was 6.7, creatinine 2 (baseline ~ 1.1). UA was negative for infection. Stool was neg for occult blood.  The patient was admitted to Hospital Medicine for further management. CTA chest revealed multiple pulmonary nodules for which Pulmonology was consulted (patient has occupational asbestos exposure) > recommended adding duo nebs and outpatient follow up). The patient has received 2 units PRBCs during this hospitalization, the last being ~ 2am on 2/24. The patient developed hypoxemia overnight 2/23 into 2/24 (87% on RA) and required 2-3L NC. CXRY on 2/24 revealed interval development of bilateral patchy interstitial and parietal attenuation reflective of edema vs infection. The patient was given a x1 dose of lasix 20mg and maintenance IVF were discontinued. On the evening of 2/24 the patient developed worsening infiltrates and increasing oxygen requirements, now needing comfort flow 30L 80% to maintain oxygen levels. He was transferred to MICU due to worsening hypoxemic respiratory failure.       Interval History: patient is weaned down on O2 to 5 L via comfort flow nc. He is feeling the same. SOWMYA showed both aortic and mitral valves vegetations.     ROS  Objective:     Vital Signs (Most Recent):  Temp: 99.2 °F (37.3 °C) (02/28/18 1100)  Pulse: (!) 111 (02/28/18 1300)  Resp: (!) 23 (02/28/18 1300)  BP: 128/68 (02/28/18 1300)  SpO2: (!) 94 % (02/28/18 1300) Vital Signs (24h Range):  Temp:  [98.7 °F (37.1 °C)-99.5 °F (37.5 °C)] 99.2 °F (37.3 °C)  Pulse:  [108-116] 111  Resp:  [] 23  SpO2:  [86  %-98 %] 94 %  BP: (125-151)/(57-84) 128/68     Weight: 76 kg (167 lb 8.8 oz)  Body mass index is 24.74 kg/m².     SpO2: (!) 94 %  O2 Device (Oxygen Therapy): nasal cannula w/ humidification      Intake/Output Summary (Last 24 hours) at 02/28/18 1334  Last data filed at 02/28/18 1200   Gross per 24 hour   Intake          1013.28 ml   Output             3375 ml   Net         -2361.72 ml       Lines/Drains/Airways     Drain                 Urethral Catheter 02/15/18 16 Fr. 13 days          Peripheral Intravenous Line                 Peripheral IV - Single Lumen 02/25/18 0827 Left Antecubital 3 days         Peripheral IV - Single Lumen 02/27/18 0515 Right Forearm 1 day                Physical Exam   Constitutional: He is oriented to person, place, and time. He appears well-developed and well-nourished. He appears ill.   HENT:   Head: Normocephalic and atraumatic.   Eyes: EOM are normal. No scleral icterus.   Right eye conjunctival redness.    Neck: Neck supple.   Cardiovascular: Regular rhythm and intact distal pulses.    Murmur (systolic at the mitral area. ) heard.  Pulmonary/Chest: He is in respiratory distress. He has no wheezes. He has no rales.   On 5 L comfort flow nc.    Musculoskeletal: He exhibits no edema.   Neurological: He is alert and oriented to person, place, and time.   Skin: Skin is warm and dry. He is not diaphoretic. There is pallor.   No splinter hemorrhage or clubbing.    Psychiatric: He has a normal mood and affect.       Significant Labs:   CMP   Recent Labs  Lab 02/26/18  1600 02/27/18  0409 02/28/18  0331     --  142   K 4.4  --  4.2     --  106   CO2 20*  --  24     --  148*   BUN 28*  --  32*   CREATININE 1.4  --  1.5*   CALCIUM 9.3  --  9.0   PROT  --  6.3 6.7   ALBUMIN 2.4* 2.3* 2.4*  2.4*   BILITOT  --  1.4* 1.6*   ALKPHOS  --  65 74   AST  --  31 31   ALT  --  29 28   ANIONGAP 12  --  12   ESTGFRAFRICA 59.7*  --  54.9*   EGFRNONAA 51.6*  --  47.5*    and CBC   Recent  Labs  Lab 02/27/18  0409 02/28/18  0331   WBC 7.63 8.36   HGB 7.4* 7.6*   HCT 22.8* 23.6*    180       Significant Imaging: Echocardiogram: SOWMYA shwoed both aortic and mitral valves vegetations.     Assessment and Plan:     Brief HPI: 67 y.o. male with HTN, BPH with recent urinary retention requiring indwelling lackey, anemia. He was sent to the ED by his PCP after he was found to have low Hb on routine labs.   Cardiology was consulted for abnormal mitral valve with a mobile density on recent TTE in the setting of sepsis and heart failure.     Subacute infective endocarditis    Patient is presenting with recurrent anemia. During his hospitalization he received 2 units of PRBCs with appropriate response. He developed hypoxemic respiratory failure on 2/25 and was transferred to MICU. CT chest with multiple pulmonary nodules. CXR with bilateral infiltrates. Echo with mitral regurgitation and mobile density/thickening.     - SOWMYA on 2/27 showed sever mitral and aortic regurgitation and vegetations.   - Blood Cx x 1 from 2/25 :GPC. awaiting speciation  - Repeat blood cx on 2/27 sill NGT. Less likely to grow any bacteria as he received multiple antibiotics recently.   - Agree with broad spectrum Abx for now per primary team and ID  - CT surgery saw the patient and will not intervene before 2-6 weeks of antibiotics and optimizing heart failure treatment.             VTE Risk Mitigation         Ordered     enoxaparin injection 40 mg  Daily     Route:  Subcutaneous        02/26/18 1935     Medium Risk of VTE  Once      02/22/18 0611     Place sequential compression device  Until discontinued      02/22/18 0343     Place MOHAN hose  Until discontinued      02/22/18 0343          Meera Mathew MD  Cardiology  [facility]

## 2018-02-28 NOTE — HPI
Rodolfo Messina is a 67 y.o. man admitted for symptomatic anemia. His evaluation included upper and lower endoscopy with no active bleeding identified. During his admission he received transfusions of RBC and IV fluid resuscitation at the thought at the time was he was hypovolemic from bowel preparation and possible pneumonia with sepsis. After this he became hypoxemic requiring comfort flow oxygen. An echocardiogram was performed.

## 2018-02-28 NOTE — CONSULTS
[facility]  Cardiothoracic Surgery  Consult Note    Patient Name: Rodolfo Messina  MRN: [id number]  Admission Date: 2018  Attending Physician: Graham Meehan MD  Referring Provider: Self, Aaareferral    Patient information was obtained from patient and past medical records.     Inpatient consult to Cardiothoracic Surgery  Consult performed by: HAIM HARRIS  Consult ordered by: DEBBY KAUFMAN  Reason for consult: infective endocarditis  Assessment/Recommendations: Will see how he responds to heart failure management and antibiotic therapy prior to determining timing of operation.         Subjective:     Principal Problem: Acute hypoxemic respiratory failure    History of Present Illness: Rodolfo Messina is a 67 y.o. man admitted for symptomatic anemia. His evaluation included upper and lower endoscopy with no active bleeding identified. During his admission he received transfusions of RBC and IV fluid resuscitation at the thought at the time was he was hypovolemic from bowel preparation and possible pneumonia with sepsis. After this he became hypoxemic requiring comfort flow oxygen. An echocardiogram was performed.     No current facility-administered medications on file prior to encounter.      Current Outpatient Prescriptions on File Prior to Encounter   Medication Sig    ALPRAZolam (XANAX) 0.25 MG tablet Take 1 tablet (0.25 mg total) by mouth nightly as needed for Insomnia.    amLODIPine (NORVASC) 10 MG tablet Take 1 tablet (10 mg total) by mouth once daily.    fluticasone (FLONASE) 50 mcg/actuation nasal spray 1 spray by Each Nare route 2 (two) times daily.    loratadine (CLARITIN) 10 mg tablet Take 10 mg by mouth once daily.    [] sulfamethoxazole-trimethoprim 800-160mg (BACTRIM DS) 800-160 mg Tab Take 1 tablet by mouth 2 (two) times daily.    tamsulosin (FLOMAX) 0.4 mg Cp24 Take 2 capsules (0.8 mg total) by mouth every evening.    VENTOLIN HFA 90  mcg/actuation inhaler     brompheniramine-pseudoeph-DM 2-30-10 mg/5 mL Syrp     CHERATUSSIN AC  mg/5 mL syrup Take 10 mLs by mouth every 6 (six) hours as needed.     mirtazapine (REMERON) 30 MG tablet TAKE 1 TABLET EVERY EVENING       Review of patient's allergies indicates:   Allergen Reactions    Augmentin [amoxicillin-pot clavulanate]      Patient felt that it raised BP and requested to have it added as intolerance. Tolerated unasyn    Ciprofloxacin     Flagyl [metronidazole]     Lisinopril Other (See Comments)     cough    Mysoline [primidone]     Omnicef [cefdinir]     Shellfish containing products        Past Medical History:   Diagnosis Date    ANNA (acute kidney injury) 2/22/2018    Anemia     Anxiety     BPH (benign prostatic hyperplasia)     Diverticulosis     Hypertension     Urinary retention      Past Surgical History:   Procedure Laterality Date    APPENDECTOMY      COLONOSCOPY      COLONOSCOPY N/A 2/1/2018    Procedure: COLONOSCOPY;  Surgeon: Richar Payan MD;  Location: [address]);  Service: Endoscopy;  Laterality: N/A;  PM prep    EYE SURGERY Right     cataract extraction    FINGER SURGERY      FRACTURE SURGERY Right     index finger    TONSILLECTOMY       Family History     Problem Relation (Age of Onset)    Heart disease Mother, Father    No Known Problems Sister, Brother    Stroke Mother        Social History Main Topics    Smoking status: Never Smoker    Smokeless tobacco: Never Used    Alcohol use Yes      Comment: none recently    Drug use: No    Sexual activity: Not Currently     Partners: Female     Review of Systems   Constitutional: Positive for fatigue.   HENT: Negative.    Eyes: Negative.    Respiratory: Positive for cough and shortness of breath.    Cardiovascular: Negative.    Gastrointestinal: Negative.    Endocrine: Negative.    Genitourinary: Negative.    Musculoskeletal: Negative.    Skin: Negative.    Allergic/Immunologic: Negative.     Neurological: Positive for tremors.   Hematological: Negative.    Psychiatric/Behavioral: Negative.      Objective:     Vital Signs (Most Recent):  Temp: 99.2 °F (37.3 °C) (02/27/18 0758)  Pulse: 110 (02/27/18 1700)  Resp: 19 (02/27/18 1700)  BP: (!) 141/65 (02/27/18 1700)  SpO2: 98 % (02/27/18 1700) Vital Signs (24h Range):  Temp:  [99 °F (37.2 °C)-99.2 °F (37.3 °C)] 99.2 °F (37.3 °C)  Pulse:  [107-118] 110  Resp:  [18-36] 19  SpO2:  [87 %-98 %] 98 %  BP: ()/(56-73) 141/65     Weight: 76 kg (167 lb 8.8 oz)  Body mass index is 24.74 kg/m².    SpO2: 98 %  O2 Device (Oxygen Therapy): High Flow nasal Cannula     Intake/Output - Last 3 Shifts       02/25 0700 - 02/26 0659 02/26 0700 - 02/27 0659 02/27 0700 - 02/28 0659    P.O. 100      I.V. (mL/kg) 55 (0.7) 60 (0.8)     IV Piggyback 1250 900 450    Total Intake(mL/kg) 1405 (18.5) 960 (12.6) 450 (5.9)    Urine (mL/kg/hr) 2820 (1.5) 2225 (1.2) 1050 (1.1)    Stool   0 (0)    Total Output 2820 2225 1050    Net -1415 -1265 -600           Stool Occurrence   1 x           Lines/Drains/Airways     Drain                 Urethral Catheter 02/15/18 16 Fr. 12 days          Peripheral Intravenous Line                 Peripheral IV - Single Lumen 02/21/18 2120 Right Upper Arm 5 days         Peripheral IV - Single Lumen 02/25/18 0827 Left Antecubital 2 days                 STS Risk Score: n/a for double valve surgery. Anticipate 5-15% expected mortality depending on urgent versus elective operation status.     Physical Exam   Constitutional: He is oriented to person, place, and time. He appears well-developed and well-nourished. He is not intubated.   HENT:   Head: Normocephalic and atraumatic.   Right Ear: External ear normal.   Left Ear: External ear normal.   Nose: Nose normal.   Mouth/Throat: Oropharynx is clear and moist.   Eyes: Conjunctivae and EOM are normal. Pupils are equal, round, and reactive to light.   Neck: Normal range of motion. Neck supple.   Cardiovascular:  Normal rate and regular rhythm.    Pulmonary/Chest: No accessory muscle usage. Tachypnea noted. He is not intubated.   Abdominal: He exhibits no distension.   Musculoskeletal: Normal range of motion.   Neurological: He is alert and oriented to person, place, and time.   Skin: Skin is warm and dry. Capillary refill takes less than 2 seconds.   Vitals reviewed.      Significant Labs:  ABGs: No results for input(s): PH, PCO2, PO2, HCO3, POCSATURATED, BE in the last 48 hours.  BMP:   Recent Labs  Lab 02/26/18  1600 02/27/18  0409     --      --    K 4.4  --      --    CO2 20*  --    BUN 28*  --    CREATININE 1.4  --    CALCIUM 9.3  --    MG  --  2.0     LFTs:   Recent Labs  Lab 02/27/18  0409   ALT 29   AST 31   ALKPHOS 65   BILITOT 1.4*   PROT 6.3   ALBUMIN 2.3*     All pertinent labs from the last 24 hours have been reviewed.    Significant Diagnostics:  CT: I have reviewed all pertinent results/findings within the past 24 hours  CXR: I have reviewed all pertinent results/findings within the past 24 hours  Echo: I have reviewed all pertinent results/findings within the past 24 hours  EKG: I have reviewed all pertinent results/findings within the past 24 hours  SOWMYA: I have reviewed all pertinent results/findings within the past 24 hours    Assessment/Plan:     NYHA Score: NYHA III: marked limitation of physical activity, comfortable at rest    Subacute bacterial endocarditis    Infectious disease consultation has been placed. Please follow their recommendations. Anticipate 2 to 6 week of treatment needed to clear infection prior to aortic and mitral valve repair versus replacement. Ideal would be to improve heart failure symptoms to point he can discharge, continue improving exercise tolerance, and return for surgery. Dr Go to staff consultation.         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             Thank you for your consult. I will sign off. Please contact us if you have any additional questions.    Christoph Saldaña MD  Cardiothoracic Surgery  [facility]

## 2018-02-28 NOTE — ASSESSMENT & PLAN NOTE
Patient is presenting with recurrent anemia. During his hospitalization he received 2 units of PRBCs with appropriate response. He developed hypoxemic respiratory failure on 2/25 and was transferred to MICU. CT chest with multiple pulmonary nodules. CXR with bilateral infiltrates. Echo with mitral regurgitation and mobile density/thickening.     - SOWMYA on 2/27 showed sever mitral and aortic regurgitation and vegetations.   - Blood Cx x 1 from 2/25 :GPC. awaiting speciation  - Repeat blood cx on 2/27 sill NGT. Less likely to grow any bacteria as he received multiple antibiotics recently.   - Agree with broad spectrum Abx for now per primary team and ID  - CT surgery saw the patient and will not intervene before 2-6 weeks of antibiotics and optimizing heart failure treatment.

## 2018-02-28 NOTE — PLAN OF CARE
Problem: Patient Care Overview  Goal: Plan of Care Review  Outcome: Ongoing (interventions implemented as appropriate)  Pt. Started on lasix gtt overnight. Diuresing well (see flow sheets for details). VSS during shift. O2 requirements weaned as tolerated. POC for continued monitoring in ICU with I/D consult for management of infection. POC reviewed and discussed with pt. And spouse. All questions and concerns were addressed. Pt. And spouse verbalized understanding.

## 2018-02-28 NOTE — CONSULTS
[facility]  Infectious Disease  Consult Note    Patient Name: Rodolfo Messina  MRN: [id number]  Admission Date: 2/21/2018  Hospital Length of Stay: 7 days  Attending Physician: Graham Meehan MD  Primary Care Provider: Deric Claudio MD     Isolation Status: No active isolations    Patient information was obtained from patient, spouse/SO and past medical records.      Inpatient consult to Infectious Diseases  Consult performed by: ROBERT RIVERA  Consult ordered by: DEBBY KAUFMAN  Reason for consult: bacterial endocarditis        Assessment/Plan:     Subacute infective endocarditis    66 yo male with HTN who presented with symptomatic anemia. Had a respiratory decompensation on floor and transferred to ICU. SOWMYA showed aortic and mitral valve vegetations suggestive of bacterial endocarditis.     - Blood culture from 2/25 growing gram positive cocci in 1/4 bottles. Low growth likely related to previous antibiotic exposure  - Continue Vanc and Unasyn while culture is pending  - Can stop azithromycin as it is appears respiratory decompensation more related to heart than pulmonary infection   - Repeat blood cultures every other day until they clear               Thank you for your consult. I will follow-up with patient. Please contact us if you have any additional questions.    Robert Rivera MD  Infectious Disease  [facility]    Subjective:     Principal Problem: Acute hypoxemic respiratory failure    HPI:  Mr. Messina is a 67 year old man with HTN, urinary retention (indwelling lackey), chronic back pain, essential tremor, anxiety and depression who was initially admitted with recurrent anemia (Hgb 6.7 on admit). Evaluated by GI who did not think patient was having an upper or lower GI bleed. He was also evaluated by hem-onc who recommended any outpatient bone marrow biopsy. He received 2 units of pRBC while admitted. Patient states that he has had a chronic  cough for the 2 months and occasional fevers for which he received steroids and antibiotics. CTA chest on admit shows multiple nodules and micronodular clusters throughout the right lung, consistent with small airway infectious versus noninfectious inflammation but not excluding malignancy (patient has history of occupational asbestos exposure and smoking). Quantiferon gold was indeterminate. He was evaluated by pulmonary who recommend duo-nebs and outpatient follow-up.  He was also seen by ID for recent UTI who recommend augmentin x 7 days. On 2/24, patient developed acute respiratory failure required up to 30 L O2 and was transferred to ICU. CXR showed  interval development of bilateral patchy interstitial and parietal attenuation reflective of edema vs infection. He was started on vanc, unasyn, and azithromycin. 2D echo revealed evidence of aortic valve vegetation. SOWMYA performed on 2/27 showed vegetation on aortic and mitral valve associated with severe aortic and mitral regurg. Blood culture from 2/26 grew gram pos cocci in 1 of 4 bottles.     Patient denies any recent dental work. He did receive blood transfusions within the last month related to anemia. No recent travel. Patient does have cats that have scratched him in the past.    Past Medical History:   Diagnosis Date    ANNA (acute kidney injury) 2/22/2018    Anemia     Anxiety     BPH (benign prostatic hyperplasia)     Diverticulosis     Hypertension     Urinary retention        Past Surgical History:   Procedure Laterality Date    APPENDECTOMY      COLONOSCOPY      COLONOSCOPY N/A 2/1/2018    Procedure: COLONOSCOPY;  Surgeon: Richar Payan MD;  Location: [facility] ([address] [place] [address]);  Service: Endoscopy;  Laterality: N/A;  PM prep    EYE SURGERY Right     cataract extraction    FINGER SURGERY      FRACTURE SURGERY Right     index finger    TONSILLECTOMY         Review of patient's allergies indicates:   Allergen Reactions    Augmentin  [amoxicillin-pot clavulanate]      Patient felt that it raised BP and requested to have it added as intolerance. Tolerated unasyn    Ciprofloxacin     Flagyl [metronidazole]     Lisinopril Other (See Comments)     cough    Mysoline [primidone]     Omnicef [cefdinir]     Shellfish containing products        Medications:  Prescriptions Prior to Admission   Medication Sig    ALPRAZolam (XANAX) 0.25 MG tablet Take 1 tablet (0.25 mg total) by mouth nightly as needed for Insomnia.    amLODIPine (NORVASC) 10 MG tablet Take 1 tablet (10 mg total) by mouth once daily.    fluticasone (FLONASE) 50 mcg/actuation nasal spray 1 spray by Each Nare route 2 (two) times daily.    loratadine (CLARITIN) 10 mg tablet Take 10 mg by mouth once daily.    [] sulfamethoxazole-trimethoprim 800-160mg (BACTRIM DS) 800-160 mg Tab Take 1 tablet by mouth 2 (two) times daily.    tamsulosin (FLOMAX) 0.4 mg Cp24 Take 2 capsules (0.8 mg total) by mouth every evening.    VENTOLIN HFA 90 mcg/actuation inhaler     [DISCONTINUED] ketorolac (TORADOL) 10 mg tablet     brompheniramine-pseudoeph-DM 2-30-10 mg/5 mL Syrp     CHERATUSSIN AC  mg/5 mL syrup Take 10 mLs by mouth every 6 (six) hours as needed.     mirtazapine (REMERON) 30 MG tablet TAKE 1 TABLET EVERY EVENING    [DISCONTINUED] traMADol (ULTRAM) 50 mg tablet Take 50 mg by mouth every 6 (six) hours as needed for Pain.     Antibiotics     Start     Stop Route Frequency Ordered    18 1701  ampicillin-sulbactam 3 g in sodium chloride 0.9 % 100 mL IVPB (ready to mix system)      -- IV Every 8 hours (non-standard times) 18 0903    18 0900  vancomycin (VANCOCIN) 1,250 mg in sodium chloride 0.9% 250 mL IVPB  (Vancomycin IVPB with levels panel)      -- IV Every 24 hours (non-standard times) 18 0839        Antifungals     None        Antivirals     None           Immunization History   Administered Date(s) Administered    Td - PF (ADULT) 2016        Family History     Problem Relation (Age of Onset)    Heart disease Mother, Father    No Known Problems Sister, Brother    Stroke Mother        Social History     Social History    Marital status:      Spouse name: N/A    Number of children: N/A    Years of education: N/A     Occupational History     [employer]     Social History Main Topics    Smoking status: Never Smoker    Smokeless tobacco: Never Used    Alcohol use Yes      Comment: none recently    Drug use: No    Sexual activity: Not Currently     Partners: Female     Other Topics Concern    None     Social History Narrative    None     Review of Systems   Constitutional: Negative for chills and fever.   HENT: Negative for nosebleeds, sore throat and trouble swallowing.    Respiratory: Positive for cough and shortness of breath. Negative for chest tightness and wheezing.    Cardiovascular: Negative for chest pain and palpitations.   Gastrointestinal: Negative for abdominal distention, abdominal pain, blood in stool, diarrhea, nausea and vomiting.   Genitourinary: Negative for dysuria, frequency, hematuria and urgency.   Neurological: Positive for tremors. Negative for speech difficulty, weakness, light-headedness and headaches.     Objective:     Vital Signs (Most Recent):  Temp: 99.2 °F (37.3 °C) (02/28/18 1100)  Pulse: (!) 111 (02/28/18 1300)  Resp: (!) 23 (02/28/18 1300)  BP: 128/68 (02/28/18 1300)  SpO2: (!) 94 % (02/28/18 1300) Vital Signs (24h Range):  Temp:  [98.7 °F (37.1 °C)-99.5 °F (37.5 °C)] 99.2 °F (37.3 °C)  Pulse:  [108-116] 111  Resp:  [] 23  SpO2:  [86 %-98 %] 94 %  BP: (125-151)/(57-84) 128/68     Weight: 76 kg (167 lb 8.8 oz)  Body mass index is 24.74 kg/m².    Estimated Creatinine Clearance: 59.7 mL/min (based on SCr of 1.2 mg/dL).    Physical Exam   Constitutional: He is oriented to person, place, and time. He appears well-developed and well-nourished.   HENT:   Head: Normocephalic and atraumatic.   Right Ear:  External ear normal.   Left Ear: External ear normal.   No oral ulceration    Eyes: EOM are normal.   Neck: Neck supple. No JVD present.   Cardiovascular: Normal rate, regular rhythm and intact distal pulses.    Murmur (systolic mumur in 4th left intercostal space) heard.  Pulmonary/Chest: He has no wheezes. He has rales.   Mild rales at base of lungs    Abdominal: Soft. Bowel sounds are normal. He exhibits no distension. There is no tenderness.   Musculoskeletal: He exhibits no edema.   Neurological: He is alert and oriented to person, place, and time.   Skin: Skin is warm and dry. Capillary refill takes less than 2 seconds.   No obvious skin breaks     Psychiatric: He has a normal mood and affect. His behavior is normal.       Significant Labs: All pertinent labs within the past 24 hours have been reviewed.    Significant Imaging: I have reviewed all pertinent imaging results/findings within the past 24 hours.

## 2018-02-28 NOTE — PT/OT/SLP EVAL
Occupational Therapy   Evaluation    Name: Rodolfo Messina  MRN: [id number]  Admitting Diagnosis:  Acute hypoxemic respiratory failure 1 Day Post-Op    Recommendations:     Discharge Recommendations: home  Discharge Equipment Recommendations:  none  Barriers to discharge:   none     History:     Occupational Profile:  Living Environment: Pt lives at home with wife in a SSH with 1 threshold to enter.   Previous level of function: PTA fully independent with ADLs and functional mobility.  Roles and Routines: pt drives, does not work and enjoys driving his camper.  Equipment Owned:  none  Assistance upon Discharge: Wife can assist upon d/c as needed.    Past Medical History:   Diagnosis Date    ANNA (acute kidney injury) 2/22/2018    Anemia     Anxiety     BPH (benign prostatic hyperplasia)     Diverticulosis     Hypertension     Urinary retention        Past Surgical History:   Procedure Laterality Date    APPENDECTOMY      COLONOSCOPY      COLONOSCOPY N/A 2/1/2018    Procedure: COLONOSCOPY;  Surgeon: Richar Payan MD;  Location: [address];  Service: Endoscopy;  Laterality: N/A;  PM prep    EYE SURGERY Right     cataract extraction    FINGER SURGERY      FRACTURE SURGERY Right     index finger    TONSILLECTOMY         Subjective     Chief Complaint: Pain in back and down legs when he coughs.  Patient/Family stated goals: To walk and not get get weak while in the hospital.  Communicated with: RN prior to session.  Pain/Comfort:  · Pain Rating 1: other (see comments) (did not rate but did c/o pain in B LE when coughing )    Patients cultural, spiritual, Judaism conflicts given the current situation: none    Objective:     Patient found with: oxygen    General Precautions: Standard, fall   Orthopedic Precautions:N/A   Braces: N/A     Occupational Performance:    Bed Mobility:    Patient completed Rolling/Turning to Left with  independence  Patient completed Rolling/Turning to Right with  independence  Patient completed Scooting/Bridging with independence  Patient completed Supine to Sit with independence  Patient completed Sit to Supine with independence    Functional Mobility/Transfers:  · Patient completed Sit <> Stand Transfer with contact guard assistance  with  no assistive device   · Patient completed Bed <> Chair Transfer using Step Transfer technique with CGA with no assistive device  · Functional Mobility: Pt transferred to chair to perform ADLs. He was able to walk from door way of room to window of room and back without LOB or use of an AD. Pt required CGA for mobility for safety.     Activities of Daily Living:  · Feeding:  modified independence pt has intentional tremors, educated on benefits of weighted utensils.  · Grooming: independence seated in bedside chair. Pt would be able to perform standing   · Bathing: DNT    · UB Dressing: DNT due to lines    · LB Dressing: DNT  · Toileting: pt did not have need but would be CGA with transfer    Cognitive/Visual Perceptual:  Cognitive/Psychosocial Skills:     -       Oriented to: Person, Place, Time and Situation   -       Follows Commands/attention:Follows one-step commands  -       Communication: clear/fluent  -       Safety awareness/insight to disability: intact   Visual/Perceptual:      -Intact    Physical Exam:  Balance: -       intact  Upper Extremity Range of Motion:     -       Right Upper Extremity: WFL  -       Left Upper Extremity: WFL  Upper Extremity Strength:    -       Right Upper Extremity: WFL  -       Left Upper Extremity: WFL  Fine Motor Coordination:    -       Intact  Gross motor coordination:   WFL  Neurological: -       WFL    Patient left up in chair with all lines intact.     AMPA 6 Click:  AMPA Total Score: 19    Treatment & Education:  · Pt educated on role of OT in acute care setting  · Educated on benefits of being upright to help with productive coughing.   · Educated on position of back to reduce pain in  "legs when coughing.    Education:    Assessment:     Rodolfo Messina is a 67 y.o. male with a medical diagnosis of Acute hypoxemic respiratory failure.  He presents with decline in ADLs and functional mobility.   Pt would benefit from skilled OT services in order to maximize independence with ADLs and facilitate safe discharge.  Performance deficits affecting function are weakness, impaired functional mobilty, impaired endurance, impaired self care skills, decreased lower extremity function, decreased safety awareness.      Rehab Prognosis:  Good; patient would benefit from acute skilled OT services to address these deficits and reach maximum level of function.         Clinical Decision Makin.  OT Low:  "Pt evaluation falls under low complexity for evaluation coding due to performance deficits noted in 1-3 areas as stated above and 0 co-morbities affecting current functional status. Data obtained from problem focused assessments. No modifications or assistance was required for completion of evaluation. Only brief occupational profile and history review completed."     Plan:     Patient to be seen 3x/week to address the above listed problems via self-care/home management, therapeutic activities, therapeutic exercises, neuromuscular re-education  · Plan of Care Expires:    · Plan of Care Reviewed with: patient, spouse    This Plan of care has been discussed with the patient who was involved in its development and understands and is in agreement with the identified goals and treatment plan    GOALS:    Occupational Therapy Goals        Problem: Occupational Therapy Goal    Goal Priority Disciplines Outcome Interventions   Occupational Therapy Goal     OT, PT/OT Ongoing (interventions implemented as appropriate)    Description:  Goals to be met by: 3/10/2018    Patient will increase functional independence with ADLs by performing:    UE Dressing with Modified [place].  LE Dressing with Modified " [place].  Grooming while standing with Modified [place].  Toileting from toilet with Modified [place] for hygiene and clothing management.   Bathing from  edge of bed with Modified [place].                      Time Tracking:     OT Date of Treatment: 02/28/18  OT Start Time: 1416  OT Stop Time: 1457  OT Total Time (min): 41 min    Billable Minutes:Evaluation 15  Self Care/Home Management 26    MILTON Dunn  2/28/2018

## 2018-02-28 NOTE — SUBJECTIVE & OBJECTIVE
Past Medical History:   Diagnosis Date    ANNA (acute kidney injury) 2018    Anemia     Anxiety     BPH (benign prostatic hyperplasia)     Diverticulosis     Hypertension     Urinary retention        Past Surgical History:   Procedure Laterality Date    APPENDECTOMY      COLONOSCOPY      COLONOSCOPY N/A 2018    Procedure: COLONOSCOPY;  Surgeon: Richar Payan MD;  Location: [address]);  Service: Endoscopy;  Laterality: N/A;  PM prep    EYE SURGERY Right     cataract extraction    FINGER SURGERY      FRACTURE SURGERY Right     index finger    TONSILLECTOMY         Review of patient's allergies indicates:   Allergen Reactions    Augmentin [amoxicillin-pot clavulanate]      Patient felt that it raised BP and requested to have it added as intolerance. Tolerated unasyn    Ciprofloxacin     Flagyl [metronidazole]     Lisinopril Other (See Comments)     cough    Mysoline [primidone]     Omnicef [cefdinir]     Shellfish containing products        Medications:  Prescriptions Prior to Admission   Medication Sig    ALPRAZolam (XANAX) 0.25 MG tablet Take 1 tablet (0.25 mg total) by mouth nightly as needed for Insomnia.    amLODIPine (NORVASC) 10 MG tablet Take 1 tablet (10 mg total) by mouth once daily.    fluticasone (FLONASE) 50 mcg/actuation nasal spray 1 spray by Each Nare route 2 (two) times daily.    loratadine (CLARITIN) 10 mg tablet Take 10 mg by mouth once daily.    [] sulfamethoxazole-trimethoprim 800-160mg (BACTRIM DS) 800-160 mg Tab Take 1 tablet by mouth 2 (two) times daily.    tamsulosin (FLOMAX) 0.4 mg Cp24 Take 2 capsules (0.8 mg total) by mouth every evening.    VENTOLIN HFA 90 mcg/actuation inhaler     [DISCONTINUED] ketorolac (TORADOL) 10 mg tablet     brompheniramine-pseudoeph-DM 2-30-10 mg/5 mL Syrp     CHERATUSSIN AC  mg/5 mL syrup Take 10 mLs by mouth every 6 (six) hours as needed.     mirtazapine (REMERON) 30 MG tablet TAKE 1 TABLET EVERY  EVENING    [DISCONTINUED] traMADol (ULTRAM) 50 mg tablet Take 50 mg by mouth every 6 (six) hours as needed for Pain.     Antibiotics     Start     Stop Route Frequency Ordered    02/28/18 1701  ampicillin-sulbactam 3 g in sodium chloride 0.9 % 100 mL IVPB (ready to mix system)      -- IV Every 8 hours (non-standard times) 02/28/18 0903    02/28/18 0900  vancomycin (VANCOCIN) 1,250 mg in sodium chloride 0.9% 250 mL IVPB  (Vancomycin IVPB with levels panel)      -- IV Every 24 hours (non-standard times) 02/27/18 0839        Antifungals     None        Antivirals     None           Immunization History   Administered Date(s) Administered    Td - PF (ADULT) 08/24/2016       Family History     Problem Relation (Age of Onset)    Heart disease Mother, Father    No Known Problems Sister, Brother    Stroke Mother        Social History     Social History    Marital status:      Spouse name: N/A    Number of children: N/A    Years of education: N/A     Occupational History     [employer]     Social History Main Topics    Smoking status: Never Smoker    Smokeless tobacco: Never Used    Alcohol use Yes      Comment: none recently    Drug use: No    Sexual activity: Not Currently     Partners: Female     Other Topics Concern    None     Social History Narrative    None     Review of Systems   Constitutional: Negative for chills and fever.   HENT: Negative for nosebleeds, sore throat and trouble swallowing.    Respiratory: Positive for cough and shortness of breath. Negative for chest tightness and wheezing.    Cardiovascular: Negative for chest pain and palpitations.   Gastrointestinal: Negative for abdominal distention, abdominal pain, blood in stool, diarrhea, nausea and vomiting.   Genitourinary: Negative for dysuria, frequency, hematuria and urgency.   Neurological: Positive for tremors. Negative for speech difficulty, weakness, light-headedness and headaches.     Objective:     Vital Signs (Most  Recent):  Temp: 99.2 °F (37.3 °C) (02/28/18 1100)  Pulse: (!) 111 (02/28/18 1300)  Resp: (!) 23 (02/28/18 1300)  BP: 128/68 (02/28/18 1300)  SpO2: (!) 94 % (02/28/18 1300) Vital Signs (24h Range):  Temp:  [98.7 °F (37.1 °C)-99.5 °F (37.5 °C)] 99.2 °F (37.3 °C)  Pulse:  [108-116] 111  Resp:  [] 23  SpO2:  [86 %-98 %] 94 %  BP: (125-151)/(57-84) 128/68     Weight: 76 kg (167 lb 8.8 oz)  Body mass index is 24.74 kg/m².    Estimated Creatinine Clearance: 59.7 mL/min (based on SCr of 1.2 mg/dL).    Physical Exam   Constitutional: He is oriented to person, place, and time. He appears well-developed and well-nourished.   HENT:   Head: Normocephalic and atraumatic.   Right Ear: External ear normal.   Left Ear: External ear normal.   No oral ulceration    Eyes: EOM are normal.   Neck: Neck supple. No JVD present.   Cardiovascular: Normal rate, regular rhythm and intact distal pulses.    Murmur (systolic mumur in 4th left intercostal space) heard.  Pulmonary/Chest: He has no wheezes. He has rales.   Mild rales at base of lungs    Abdominal: Soft. Bowel sounds are normal. He exhibits no distension. There is no tenderness.   Musculoskeletal: He exhibits no edema.   Neurological: He is alert and oriented to person, place, and time.   Skin: Skin is warm and dry. Capillary refill takes less than 2 seconds.   No obvious skin breaks     Psychiatric: He has a normal mood and affect. His behavior is normal.       Significant Labs: All pertinent labs within the past 24 hours have been reviewed.    Significant Imaging: I have reviewed all pertinent imaging results/findings within the past 24 hours.

## 2018-02-28 NOTE — SUBJECTIVE & OBJECTIVE
Interval History/Significant Events:  SOWMYA with mitral and aortic endocarditis, severe MR and moderate AI. Continues to diurese well. Weaning oxygen. No complaints this AM     Review of Systems   Constitutional: Negative for chills and fever.   HENT: Negative for nosebleeds, sore throat and trouble swallowing.    Respiratory: Positive for cough (improving. no hemoptysis ). Negative for chest tightness, shortness of breath and wheezing.    Cardiovascular: Negative for chest pain and palpitations.   Gastrointestinal: Negative for abdominal distention, abdominal pain, blood in stool, diarrhea, nausea and vomiting.   Genitourinary: Negative for dysuria, frequency, hematuria and urgency.   Neurological: Positive for tremors. Negative for speech difficulty, weakness, light-headedness and headaches.     Objective:     Vital Signs (Most Recent):  Temp: 99.5 °F (37.5 °C) (02/28/18 0300)  Pulse: 110 (02/28/18 0800)  Resp: (!) 22 (02/28/18 0800)  BP: 137/66 (02/28/18 0800)  SpO2: (!) 94 % (02/28/18 0800) Vital Signs (24h Range):  Temp:  [98.7 °F (37.1 °C)-99.5 °F (37.5 °C)] 99.5 °F (37.5 °C)  Pulse:  [108-116] 110  Resp:  [] 22  SpO2:  [86 %-98 %] 94 %  BP: ()/(57-74) 137/66   Weight: 76 kg (167 lb 8.8 oz)  Body mass index is 24.74 kg/m².      Intake/Output Summary (Last 24 hours) at 02/28/18 0908  Last data filed at 02/28/18 0800   Gross per 24 hour   Intake           303.28 ml   Output             3250 ml   Net         -2946.72 ml       Physical Exam   Constitutional: He is oriented to person, place, and time. He appears well-developed. He has a sickly appearance. He appears ill. No distress.   HENT:   Head: Normocephalic and atraumatic.   No obvious cracked teeth. Fillings noted. Non oral ulcerations.    Eyes: EOM are normal. Pupils are equal, round, and reactive to light. Right eye exhibits no discharge. Left eye exhibits no discharge. Right conjunctiva has a hemorrhage. No scleral icterus.   Neck: Normal range  of motion. Neck supple. No JVD present. No tracheal deviation present. No thyromegaly present.   Cardiovascular: Normal rate, regular rhythm, S1 normal and S2 normal.    Murmur heard.   Systolic murmur is present with a grade of 3/6   Pulses:       Radial pulses are 2+ on the right side, and 2+ on the left side.        Dorsalis pedis pulses are 2+ on the right side, and 2+ on the left side.   Warm extremities, no peripheral edema   Pulmonary/Chest: No accessory muscle usage. No tachypnea. No respiratory distress. He has no decreased breath sounds. He has no wheezes. He has no rales.   Abdominal: Soft. Bowel sounds are normal. He exhibits no distension. There is no guarding.   Lymphadenopathy:     He has no cervical adenopathy.   Neurological: He is alert and oriented to person, place, and time. GCS eye subscore is 4. GCS verbal subscore is 5. GCS motor subscore is 6.   Following all commands. Moving all extremities spontaneously and symmetrically    Skin: Skin is dry. Capillary refill takes less than 2 seconds. He is not diaphoretic. There is pallor.   No noted sores, skin breakdown. No splinter hemorrages        Vents:  Oxygen Concentration (%): 50 (02/28/18 0727)  Lines/Drains/Airways     Drain                 Urethral Catheter 02/15/18 16 Fr. 13 days          Peripheral Intravenous Line                 Peripheral IV - Single Lumen 02/25/18 0827 Left Antecubital 3 days         Peripheral IV - Single Lumen 02/27/18 0515 Right Forearm 1 day              Significant Labs:    CBC/Anemia Profile:    Recent Labs  Lab 02/27/18  0409 02/28/18  0331   WBC 7.63 8.36   HGB 7.4* 7.6*   HCT 22.8* 23.6*    180   MCV 84 83   RDW 19.4* 19.7*        Chemistries:    Recent Labs  Lab 02/26/18  1112 02/26/18  1600 02/27/18  0409 02/28/18  0331    139  --  142   K 5.0 4.4  --  4.2    107  --  106   CO2 21* 20*  --  24   BUN 27* 28*  --  32*   CREATININE 1.3 1.4  --  1.5*   CALCIUM 9.1 9.3  --  9.0   ALBUMIN 2.3*  2.4* 2.3* 2.4*  2.4*   PROT  --   --  6.3 6.7   BILITOT  --   --  1.4* 1.6*   ALKPHOS  --   --  65 74   ALT  --   --  29 28   AST  --   --  31 31   MG  --   --  2.0 2.0   PHOS 4.8* 4.7* 4.9* 4.3         Significant Imaging:  I have reviewed and interpreted all pertinent imaging results/findings within the past 24 hours.

## 2018-03-01 LAB
ALBUMIN SERPL BCP-MCNC: 2.4 G/DL
ALBUMIN SERPL BCP-MCNC: 2.4 G/DL
ALBUMIN SERPL BCP-MCNC: 2.7 G/DL
ALP SERPL-CCNC: 68 U/L
ALT SERPL W/O P-5'-P-CCNC: 39 U/L
ANION GAP SERPL CALC-SCNC: 11 MMOL/L
ANION GAP SERPL CALC-SCNC: 9 MMOL/L
AORTIC ATHEROMA: YES
AORTIC VALVE REGURGITATION: ABNORMAL
AST SERPL-CCNC: 51 U/L
BASOPHILS # BLD AUTO: 0.02 K/UL
BASOPHILS NFR BLD: 0.2 %
BILIRUB DIRECT SERPL-MCNC: 0.7 MG/DL
BILIRUB SERPL-MCNC: 1.3 MG/DL
BUN SERPL-MCNC: 34 MG/DL
BUN SERPL-MCNC: 39 MG/DL
CALCIUM SERPL-MCNC: 9.2 MG/DL
CALCIUM SERPL-MCNC: 9.2 MG/DL
CHLORIDE SERPL-SCNC: 105 MMOL/L
CHLORIDE SERPL-SCNC: 107 MMOL/L
CO2 SERPL-SCNC: 26 MMOL/L
CO2 SERPL-SCNC: 27 MMOL/L
CREAT SERPL-MCNC: 1.3 MG/DL
CREAT SERPL-MCNC: 1.4 MG/DL
DIFFERENTIAL METHOD: ABNORMAL
EOSINOPHIL # BLD AUTO: 0.6 K/UL
EOSINOPHIL NFR BLD: 8 %
ERYTHROCYTE [DISTWIDTH] IN BLOOD BY AUTOMATED COUNT: 19.2 %
EST. GFR  (AFRICAN AMERICAN): 59.7 ML/MIN/1.73 M^2
EST. GFR  (AFRICAN AMERICAN): >60 ML/MIN/1.73 M^2
EST. GFR  (NON AFRICAN AMERICAN): 51.6 ML/MIN/1.73 M^2
EST. GFR  (NON AFRICAN AMERICAN): 56.5 ML/MIN/1.73 M^2
GLUCOSE SERPL-MCNC: 119 MG/DL
GLUCOSE SERPL-MCNC: 131 MG/DL
HCT VFR BLD AUTO: 22 %
HGB BLD-MCNC: 7 G/DL
IMM GRANULOCYTES # BLD AUTO: 0.03 K/UL
IMM GRANULOCYTES NFR BLD AUTO: 0.4 %
LYMPHOCYTES # BLD AUTO: 0.8 K/UL
LYMPHOCYTES NFR BLD: 9.7 %
MAGNESIUM SERPL-MCNC: 1.9 MG/DL
MCH RBC QN AUTO: 27.1 PG
MCHC RBC AUTO-ENTMCNC: 31.8 G/DL
MCV RBC AUTO: 85 FL
MITRAL VALVE MOBILITY: NORMAL
MITRAL VALVE REGURGITATION: ABNORMAL
MONOCYTES # BLD AUTO: 0.4 K/UL
MONOCYTES NFR BLD: 5.4 %
NEUTROPHILS # BLD AUTO: 6.1 K/UL
NEUTROPHILS NFR BLD: 76.3 %
NRBC BLD-RTO: 0 /100 WBC
PHOSPHATE SERPL-MCNC: 3.9 MG/DL
PHOSPHATE SERPL-MCNC: 4.2 MG/DL
PLATELET # BLD AUTO: 155 K/UL
PMV BLD AUTO: 10.5 FL
POTASSIUM SERPL-SCNC: 3.8 MMOL/L
POTASSIUM SERPL-SCNC: 3.9 MMOL/L
PROT SERPL-MCNC: 6 G/DL
RBC # BLD AUTO: 2.58 M/UL
RETIRED EF AND QEF - SEE NOTES: 70 (ref 55–65)
SODIUM SERPL-SCNC: 142 MMOL/L
SODIUM SERPL-SCNC: 143 MMOL/L
WBC # BLD AUTO: 8.02 K/UL

## 2018-03-01 PROCEDURE — 87040 BLOOD CULTURE FOR BACTERIA: CPT

## 2018-03-01 PROCEDURE — 97530 THERAPEUTIC ACTIVITIES: CPT

## 2018-03-01 PROCEDURE — 25000003 PHARM REV CODE 250: Performed by: INTERNAL MEDICINE

## 2018-03-01 PROCEDURE — 63600175 PHARM REV CODE 636 W HCPCS: Performed by: INTERNAL MEDICINE

## 2018-03-01 PROCEDURE — 80076 HEPATIC FUNCTION PANEL: CPT

## 2018-03-01 PROCEDURE — 83735 ASSAY OF MAGNESIUM: CPT

## 2018-03-01 PROCEDURE — 27000221 HC OXYGEN, UP TO 24 HOURS

## 2018-03-01 PROCEDURE — 25000003 PHARM REV CODE 250: Performed by: NURSE PRACTITIONER

## 2018-03-01 PROCEDURE — 99231 SBSQ HOSP IP/OBS SF/LOW 25: CPT | Mod: ,,, | Performed by: INTERNAL MEDICINE

## 2018-03-01 PROCEDURE — 97164 PT RE-EVAL EST PLAN CARE: CPT

## 2018-03-01 PROCEDURE — 99232 SBSQ HOSP IP/OBS MODERATE 35: CPT | Mod: ,,, | Performed by: INTERNAL MEDICINE

## 2018-03-01 PROCEDURE — 85025 COMPLETE CBC W/AUTO DIFF WBC: CPT

## 2018-03-01 PROCEDURE — 11000001 HC ACUTE MED/SURG PRIVATE ROOM

## 2018-03-01 PROCEDURE — 63600175 PHARM REV CODE 636 W HCPCS: Performed by: NURSE PRACTITIONER

## 2018-03-01 PROCEDURE — 25000003 PHARM REV CODE 250: Performed by: HOSPITALIST

## 2018-03-01 PROCEDURE — 80069 RENAL FUNCTION PANEL: CPT | Mod: 91

## 2018-03-01 PROCEDURE — 36415 COLL VENOUS BLD VENIPUNCTURE: CPT

## 2018-03-01 RX ADMIN — FUROSEMIDE 40 MG: 10 INJECTION, SOLUTION INTRAMUSCULAR; INTRAVENOUS at 09:03

## 2018-03-01 RX ADMIN — OXYCODONE HYDROCHLORIDE 5 MG: 5 TABLET ORAL at 09:03

## 2018-03-01 RX ADMIN — Medication 1 SPRAY: at 09:03

## 2018-03-01 RX ADMIN — GUAIFENESIN AND DEXTROMETHORPHAN 5 ML: 100; 10 SYRUP ORAL at 09:03

## 2018-03-01 RX ADMIN — AMPICILLIN SODIUM AND SULBACTAM SODIUM 3 G: 2; 1 INJECTION, POWDER, FOR SOLUTION INTRAMUSCULAR; INTRAVENOUS at 09:03

## 2018-03-01 RX ADMIN — TAMSULOSIN HYDROCHLORIDE 0.4 MG: 0.4 CAPSULE ORAL at 09:03

## 2018-03-01 RX ADMIN — VANCOMYCIN HYDROCHLORIDE 1250 MG: 1 INJECTION, POWDER, LYOPHILIZED, FOR SOLUTION INTRAVENOUS at 09:03

## 2018-03-01 RX ADMIN — AMPICILLIN SODIUM AND SULBACTAM SODIUM 3 G: 2; 1 INJECTION, POWDER, FOR SOLUTION INTRAMUSCULAR; INTRAVENOUS at 02:03

## 2018-03-01 RX ADMIN — FLUTICASONE PROPIONATE 50 MCG: 50 SPRAY, METERED NASAL at 09:03

## 2018-03-01 RX ADMIN — AMPICILLIN SODIUM AND SULBACTAM SODIUM 3 G: 2; 1 INJECTION, POWDER, FOR SOLUTION INTRAMUSCULAR; INTRAVENOUS at 05:03

## 2018-03-01 RX ADMIN — MIRTAZAPINE 30 MG: 15 TABLET, FILM COATED ORAL at 09:03

## 2018-03-01 RX ADMIN — AMLODIPINE BESYLATE 10 MG: 10 TABLET ORAL at 09:03

## 2018-03-01 RX ADMIN — GUAIFENESIN AND DEXTROMETHORPHAN 5 ML: 100; 10 SYRUP ORAL at 05:03

## 2018-03-01 RX ADMIN — ENOXAPARIN SODIUM 40 MG: 100 INJECTION SUBCUTANEOUS at 05:03

## 2018-03-01 RX ADMIN — BENZONATATE 100 MG: 100 CAPSULE ORAL at 09:03

## 2018-03-01 RX ADMIN — FUROSEMIDE 40 MG: 10 INJECTION, SOLUTION INTRAMUSCULAR; INTRAVENOUS at 05:03

## 2018-03-01 NOTE — ASSESSMENT & PLAN NOTE
Patient is presenting with recurrent anemia. During his hospitalization he received 2 units of PRBCs with appropriate response. He developed hypoxemic respiratory failure on 2/25 and was transferred to MICU. CT chest with multiple pulmonary nodules. CXR with bilateral infiltrates. Echo with mitral regurgitation and mobile density/thickening.     - SOWMYA on 2/27 showed sever mitral and aortic regurgitation and vegetations.   - Blood Cx x 1 from 2/25 : Enterococcus fecalis. Waiting on sensitivity.   - Repeat blood cx on 2/27 with one positive culture GPC resembling strept.   - ID team is following. Will defer antibiotic management to them.   - Repeat 2D Echo in 4-6 weeks for reevaluation of aortic and mitral valve function. Will defer decision of valve replacement to CT surgery.

## 2018-03-01 NOTE — PROGRESS NOTES
[facility]  Infectious Disease  Progress Note    Patient Name: Rodolfo Messina  MRN: [id number]  Admission Date: 2/21/2018  Length of Stay: 8 days  Attending Physician: John Agudelo MD  Primary Care Provider: Deric Claudio MD    Isolation Status: No active isolations  Assessment/Plan:      Subacute infective endocarditis    66 yo male with HTN who presented with symptomatic anemia. Had a respiratory decompensation on floor and transferred to ICU. SOWMYA showed aortic and mitral valve vegetations suggestive of bacterial endocarditis.     - Blood culture from 2/25 growing Enterococcus Faecalis. Repeat BCx 2/27 growing gram+ cocci in chains   - Continue Vanc and Unasyn while sensitivities are pending  - Repeat blood cultures every other day until they clear   - Defer to CTS/Cards for valvular management               Thank you for your consult. I will follow-up with patient. Please contact us if you have any additional questions.    Zain Niño MD  Infectious Disease  [facility]    Subjective:     Principal Problem:Acute hypoxemic respiratory failure    HPI:  Mr. Messina is a 67 year old man with HTN, urinary retention (indwelling lackey), chronic back pain, essential tremor, anxiety and depression who was initially admitted with recurrent anemia (Hgb 6.7 on admit). Evaluated by GI who did not think patient was having an upper or lower GI bleed. He was also evaluated by hem-onc who recommended any outpatient bone marrow biopsy. He received 2 units of pRBC while admitted. Patient states that he has had a chronic cough for the 2 months and occasional fevers for which he received steroids and antibiotics. CTA chest on admit shows multiple nodules and micronodular clusters throughout the right lung, consistent with small airway infectious versus noninfectious inflammation but not excluding malignancy (patient has history of occupational asbestos exposure and smoking).  Quantiferon gold was indeterminate. He was evaluated by pulmonary who recommend duo-nebs and outpatient follow-up.  He was also seen by ID for recent UTI who recommend augmentin x 7 days. On 2/24, patient developed acute respiratory failure required up to 30 L O2 and was transferred to ICU. CXR showed  interval development of bilateral patchy interstitial and parietal attenuation reflective of edema vs infection. He was started on vanc, unasyn, and azithromycin. 2D echo revealed evidence of aortic valve vegetation. SOWMYA performed on 2/27 showed vegetation on aortic and mitral valve associated with severe aortic and mitral regurg. Blood culture from 2/26 grew gram pos cocci in 1 of 4 bottles.     Patient denies any recent dental work. He did receive blood transfusions within the last month related to anemia. No recent travel. Patient does have cats that have scratched him in the past.  Interval History: Stepped down to floor. Reports improvement in breathing. Afebrile. BCx positive for enterococcus faecalis.  Repeat BCx on 2/27 positive as well    Review of Systems   Constitutional: Negative for chills and fever.   HENT: Negative for nosebleeds, sore throat and trouble swallowing.    Respiratory: Positive for cough and shortness of breath. Negative for chest tightness and wheezing.    Cardiovascular: Negative for chest pain and palpitations.   Gastrointestinal: Negative for abdominal distention, abdominal pain, blood in stool, diarrhea, nausea and vomiting.   Genitourinary: Negative for dysuria, frequency, hematuria and urgency.   Neurological: Positive for tremors. Negative for speech difficulty, weakness, light-headedness and headaches.     Objective:     Vital Signs (Most Recent):  Temp: 98.2 °F (36.8 °C) (03/01/18 1254)  Pulse: (!) 120 (03/01/18 1441)  Resp: 18 (03/01/18 1254)  BP: 127/62 (03/01/18 1254)  SpO2: (!) 91 % (03/01/18 1254) Vital Signs (24h Range):  Temp:  [98 °F (36.7 °C)-99 °F (37.2 °C)] 98.2 °F  (36.8 °C)  Pulse:  [] 120  Resp:  [16-31] 18  SpO2:  [91 %-98 %] 91 %  BP: (123-147)/(62-78) 127/62     Weight: 76 kg (167 lb 8.8 oz)  Body mass index is 24.74 kg/m².    Estimated Creatinine Clearance: 55.1 mL/min (based on SCr of 1.3 mg/dL).    Physical Exam   Constitutional: He is oriented to person, place, and time. He appears well-developed and well-nourished.   HENT:   Head: Normocephalic and atraumatic.   Right Ear: External ear normal.   Left Ear: External ear normal.   No oral ulceration    Eyes: EOM are normal.   Neck: Neck supple. No JVD present.   Cardiovascular: Normal rate, regular rhythm and intact distal pulses.    Murmur (systolic mumur in 4th left intercostal space) heard.  Pulmonary/Chest: He has no wheezes. He has rales.   Mild rales at base of lungs    Abdominal: Soft. Bowel sounds are normal. He exhibits no distension. There is no tenderness.   Musculoskeletal: He exhibits no edema.   Neurological: He is alert and oriented to person, place, and time.   Skin: Skin is warm and dry. Capillary refill takes less than 2 seconds.   No obvious skin breaks     Psychiatric: He has a normal mood and affect. His behavior is normal.       Significant Labs: All pertinent labs within the past 24 hours have been reviewed.    Significant Imaging: I have reviewed all pertinent imaging results/findings within the past 24 hours.

## 2018-03-01 NOTE — SUBJECTIVE & OBJECTIVE
Interval History: Stepped down to floor. Reports improvement in breathing. Afebrile. BCx positive for enterococcus faecalis.  Repeat BCx on 2/27 positive as well    Review of Systems   Constitutional: Negative for chills and fever.   HENT: Negative for nosebleeds, sore throat and trouble swallowing.    Respiratory: Positive for cough and shortness of breath. Negative for chest tightness and wheezing.    Cardiovascular: Negative for chest pain and palpitations.   Gastrointestinal: Negative for abdominal distention, abdominal pain, blood in stool, diarrhea, nausea and vomiting.   Genitourinary: Negative for dysuria, frequency, hematuria and urgency.   Neurological: Positive for tremors. Negative for speech difficulty, weakness, light-headedness and headaches.     Objective:     Vital Signs (Most Recent):  Temp: 98.2 °F (36.8 °C) (03/01/18 1254)  Pulse: (!) 120 (03/01/18 1441)  Resp: 18 (03/01/18 1254)  BP: 127/62 (03/01/18 1254)  SpO2: (!) 91 % (03/01/18 1254) Vital Signs (24h Range):  Temp:  [98 °F (36.7 °C)-99 °F (37.2 °C)] 98.2 °F (36.8 °C)  Pulse:  [] 120  Resp:  [16-31] 18  SpO2:  [91 %-98 %] 91 %  BP: (123-147)/(62-78) 127/62     Weight: 76 kg (167 lb 8.8 oz)  Body mass index is 24.74 kg/m².    Estimated Creatinine Clearance: 55.1 mL/min (based on SCr of 1.3 mg/dL).    Physical Exam   Constitutional: He is oriented to person, place, and time. He appears well-developed and well-nourished.   HENT:   Head: Normocephalic and atraumatic.   Right Ear: External ear normal.   Left Ear: External ear normal.   No oral ulceration    Eyes: EOM are normal.   Neck: Neck supple. No JVD present.   Cardiovascular: Normal rate, regular rhythm and intact distal pulses.    Murmur (systolic mumur in 4th left intercostal space) heard.  Pulmonary/Chest: He has no wheezes. He has rales.   Mild rales at base of lungs    Abdominal: Soft. Bowel sounds are normal. He exhibits no distension. There is no tenderness.   Musculoskeletal:  He exhibits no edema.   Neurological: He is alert and oriented to person, place, and time.   Skin: Skin is warm and dry. Capillary refill takes less than 2 seconds.   No obvious skin breaks     Psychiatric: He has a normal mood and affect. His behavior is normal.       Significant Labs: All pertinent labs within the past 24 hours have been reviewed.    Significant Imaging: I have reviewed all pertinent imaging results/findings within the past 24 hours.

## 2018-03-01 NOTE — PT/OT/SLP RE-EVAL
Physical Therapy Re-evaluation    Patient Name:  Rodolfo Messina   MRN:  [id number]    Recommendations:     Discharge Recommendations:  home with home health   Discharge Equipment Recommendations: cane, straight   Barriers to discharge: None    Assessment:     Rodolfo Messina is a 67 y.o. male admitted with a medical diagnosis of Acute hypoxemic respiratory failure.  He presents with the following impairments/functional limitations:  impaired endurance, weakness, impaired functional mobilty, impaired self care skills, gait instability. Patient has declined in functional status following initial eval. Patient currently requires CGA when out of bed, but maintains a good level of functional mobility and strength. Patient is expected to progress well and will benefit from services in the acute setting w/ HHPT followup. It should be noted that patient has an action tremor of BUE.    Rehab Prognosis:  fair; patient would benefit from acute skilled PT services to address these deficits and reach maximum level of function.      Recent Surgery: Procedure(s) (LRB):  TRANSESOPHAGEAL ECHOCARDIOGRAM (SOWMYA) (N/A) 2 Days Post-Op    Plan:     During this hospitalization, patient to be seen 3 x/week to address the above listed problems via therapeutic activities, therapeutic exercises, neuromuscular re-education, gait training  · Plan of Care Expires:  04/01/18   Plan of Care Reviewed with: spouse, patient    Subjective     Communicated with nurse prior to session.  Patient found sitting EOB upon PT entry to room, agreeable to evaluation.      Chief Complaint: decreased functional mobility  Patient comments/goals: return to plof  Pain/Comfort:  · Pain Rating 1: 0/10    Patients cultural, spiritual, Taoism conflicts given the current situation: none stated      Objective:     Patient found with: lackey catheter, telemetry, peripheral IV     General Precautions: Standard, fall   Orthopedic Precautions:N/A   Braces: N/A      Exams:  · Cognitive Exam:  Patient is oriented to Person, Place, Time and Situation and follows 100% of all commands   · Fine Motor Coordination:    · -       Intact  · Gross Motor Coordination:  WFL  · Sensation:    · -       Intact  · RLE ROM: WFL  · RLE Strength: WFL  · LLE ROM: WFL  · LLE Strength: WFL  · Patient grossly 3+/5    Functional Mobility:  · Bed Mobility:  Supine to Sit: stand by assistance  · Transfers:  Sit to Stand:  stand by assistance with no AD  · Gait: 86'  · CGA w/ no AD; no LOB but slight gait instability   Patient ambulated on room air and sated at 92%O2 and HR 112BPM, patient showing no signs of cardiopulmonary distress.    Patient has poor tandem walk/stance abilities and demonstrates moderate postural sway while standing w/ eyes closed w/ feet together.    AM-PAC 6 CLICK MOBILITY  Total Score:19       Therapeutic Activities and Exercises:   PT Re-Eval completed  Patient and spouse educated on therex to perform in room: LAQ, Hip Flex, Ankle Pumps, Sit<>Stand    Patient left sitting EOB with all lines intact, call button in reach, nurse notified and wife present.    GOALS:    Physical Therapy Goals        Problem: Physical Therapy Goal    Goal Priority Disciplines Outcome Goal Variances Interventions   Physical Therapy Goal     PT/OT, PT Ongoing (interventions implemented as appropriate)     Description:  Goals to be met by: 10 days (3/11)    Patient will increase functional independence with mobility by performin. Sit to stand transfer with Modified [place]  2. Gait  x 200 feet with Modified [place] using LRAD  3. Lower extremity exercise program x15 reps in standing, with independence to improve balance, muscular strength and endurance w/ assistance as needed.                            History:     Past Medical History:   Diagnosis Date    ANNA (acute kidney injury) 2018    Anemia     Anxiety     BPH (benign prostatic hyperplasia)     Diverticulosis      Hypertension     Urinary retention        Past Surgical History:   Procedure Laterality Date    APPENDECTOMY      COLONOSCOPY      COLONOSCOPY N/A 2/1/2018    Procedure: COLONOSCOPY;  Surgeon: Richar Payan MD;  Location: [address];  Service: Endoscopy;  Laterality: N/A;  PM prep    EYE SURGERY Right     cataract extraction    FINGER SURGERY      FRACTURE SURGERY Right     index finger    TONSILLECTOMY         Clinical Decision Making:     History  Co-morbidities and personal factors that may impact the plan of care Examination  Body Structures and Functions, activity limitations and participation restrictions that may impact the plan of care Clinical Presentation   Decision Making/ Complexity Score   Co-morbidities:   [] Time since onset of injury / illness / exacerbation  [] Status of current condition  []Patient's cognitive status and safety concerns    [] Multiple Medical Problems (see med hx)  Personal Factors:   [] Patient's age  [] Prior Level of function   [] Patient's home situation (environment and family support)  [] Patient's level of motivation  [] Expected progression of patient      HISTORY:(criteria)    [] 91624 - no personal factors/history    [] 08580 - has 1-2 personal factor/comorbidity     [] 61906 - has >3 personal factor/comorbidity     Body Regions:  [] Objective examination findings  [] Head     []  Neck  [] Trunk   [] Upper Extremity  [] Lower Extremity    Body Systems:  [] For communication ability, affect, cognition, language, and learning style: the assessment of the ability to make needs known, consciousness, orientation (person, place, and time), expected emotional /behavioral responses, and learning preferences (eg, learning barriers, education  needs)  [] For the neuromuscular system: a general assessment of gross coordinated movement (eg, balance, gait, locomotion, transfers, and transitions) and motor function  (motor control and motor learning)  [] For the  musculoskeletal system: the assessment of gross symmetry, gross range of motion, gross strength, height, and weight  [] For the integumentary system: the assessment of pliability(texture), presence of scar formation, skin color, and skin integrity  [] For cardiovascular/pulmonary system: the assessment of heart rate, respiratory rate, blood pressure, and edema     Activity limitations:    [] Patient's cognitive status and saf ety concerns          [] Status of current condition      [] Weight bearing restriction  [] Cardiopulmunary Restriction    Participation Restrictions:   [] Goals and goal agreement with the patient     [] Rehab potential (prognosis) and probable outcome      Examination of Body System: (criteria)    [] 55463 - addressing 1-2 elements    [] 26531 - addressing a total of 3 or more elements     [] 12026 -  Addressing a total of 4 or more elements         Clinical Presentation: (criteria)  Evolving - 92882     On examination of body system using standardized tests and measures patient presents with 1-2 elements from any of the following: body structures and functions, activity limitations, and/or participation restrictions.  Leading to a clinical presentation that is considered evolving with changing characteristics                              Clinical Decision Making  (Eval Complexity):  Low- 35172     Time Tracking:     PT Received On: 03/01/18  PT Start Time: 1400     PT Stop Time: 1424  PT Total Time (min): 24 min     Billable Minutes: Re-eval 16 Min and Therapeutic Activity 8 Min      Luís Rocha, PT  03/01/2018

## 2018-03-01 NOTE — PLAN OF CARE
Discharge planning: Met with patient and spouse and discussed the discharge plan for IV antibiotics at home. SW/CM to set up when medically stable for discharge.

## 2018-03-01 NOTE — SUBJECTIVE & OBJECTIVE
Interval History: patient oxygen requirement is improving. He is complaining of body aches.     ROS  Objective:     Vital Signs (Most Recent):  Temp: 98.8 °F (37.1 °C) (03/01/18 0758)  Pulse: 97 (Simultaneous filing. User may not have seen previous data.) (03/01/18 0758)  Resp: 16 (03/01/18 0758)  BP: 131/67 (03/01/18 0758)  SpO2: 98 % (03/01/18 0758) Vital Signs (24h Range):  Temp:  [98 °F (36.7 °C)-99.2 °F (37.3 °C)] 98.8 °F (37.1 °C)  Pulse:  [] 97  Resp:  [16-31] 16  SpO2:  [91 %-98 %] 98 %  BP: (123-147)/(58-78) 131/67     Weight: 76 kg (167 lb 8.8 oz)  Body mass index is 24.74 kg/m².     SpO2: 98 %  O2 Device (Oxygen Therapy): nasal cannula      Intake/Output Summary (Last 24 hours) at 03/01/18 0921  Last data filed at 03/01/18 0700   Gross per 24 hour   Intake              498 ml   Output             1425 ml   Net             -927 ml       Lines/Drains/Airways     Drain                 Urethral Catheter 02/15/18 16 Fr. 14 days          Peripheral Intravenous Line                 Peripheral IV - Single Lumen 02/27/18 0515 Right Forearm 2 days         Peripheral IV - Single Lumen 02/28/18 1736 Left Wrist less than 1 day                Physical Exam   Constitutional: He is oriented to person, place, and time. He appears well-developed and well-nourished. He appears ill. No distress.   HENT:   Head: Normocephalic and atraumatic.   Eyes: EOM are normal. No scleral icterus.   Right eye conjunctival redness.    Neck: Neck supple.   Cardiovascular: Regular rhythm and intact distal pulses.    Murmur (systolic at the mitral area. ) heard.  Pulmonary/Chest: He is in respiratory distress. He has no wheezes. He has no rales.   On 3 L comfort flow nc.    Musculoskeletal: He exhibits no edema.   Neurological: He is alert and oriented to person, place, and time.   Skin: Skin is warm and dry. He is not diaphoretic. There is pallor.   No splinter hemorrhage or clubbing.    Psychiatric: He has a normal mood and affect.        Significant Labs:   CMP   Recent Labs  Lab 02/28/18  0331 02/28/18  1214 02/28/18 2000 03/01/18  0503    140 142  --    K 4.2 3.9 3.9  --     105 106  --    CO2 24 28 29  --    * 139* 146*  --    BUN 32* 31* 34*  --    CREATININE 1.5* 1.2 1.4  --    CALCIUM 9.0 9.0 8.9  --    PROT 6.7  --   --  6.0   ALBUMIN 2.4*  2.4* 2.3* 2.3* 2.4*   BILITOT 1.6*  --   --  1.3*   ALKPHOS 74  --   --  68   AST 31  --   --  51*   ALT 28  --   --  39   ANIONGAP 12 7* 7*  --    ESTGFRAFRICA 54.9* >60.0 59.7*  --    EGFRNONAA 47.5* >60.0 51.6*  --     and CBC   Recent Labs  Lab 02/28/18  0331 03/01/18  0503   WBC 8.36 8.02   HGB 7.6* 7.0*   HCT 23.6* 22.0*    155       Significant Imaging: X-Ray: CXR: X-Ray Chest 1 View (CXR):   Results for orders placed or performed during the hospital encounter of 02/21/18   X-Ray Chest 1 View    Narrative    One view: There is cardiomegaly, moderate edema, no change.      Electronically signed by: EUGENE PENG MD  Date:     02/27/18  Time:    08:03

## 2018-03-01 NOTE — PROGRESS NOTES
[facility]  Cardiology  Progress Note    Patient Name: Rodolfo Messina  MRN: [id number]  Admission Date: 2/21/2018  Hospital Length of Stay: 8 days  Code Status: Full Code   Attending Physician: John Agudelo MD   Primary Care Physician: Deric Claudio MD  Expected Discharge Date: 3/3/2018  Principal Problem:Acute hypoxemic respiratory failure    Subjective:     Hospital Course:   In the ED his hgb was 6.7, creatinine 2 (baseline ~ 1.1). UA was negative for infection. Stool was neg for occult blood.  The patient was admitted to Hospital Medicine for further management. CTA chest revealed multiple pulmonary nodules for which Pulmonology was consulted (patient has occupational asbestos exposure) > recommended adding duo nebs and outpatient follow up). The patient has received 2 units PRBCs during this hospitalization, the last being ~ 2am on 2/24. The patient developed hypoxemia overnight 2/23 into 2/24 (87% on RA) and required 2-3L NC. CXRY on 2/24 revealed interval development of bilateral patchy interstitial and parietal attenuation reflective of edema vs infection. The patient was given a x1 dose of lasix 20mg and maintenance IVF were discontinued. On the evening of 2/24 the patient developed worsening infiltrates and increasing oxygen requirements, now needing comfort flow 30L 80% to maintain oxygen levels. He was transferred to MICU due to worsening hypoxemic respiratory failure.       Interval History: patient oxygen requirement is improving. He is complaining of body aches.     ROS  Objective:     Vital Signs (Most Recent):  Temp: 98.8 °F (37.1 °C) (03/01/18 0758)  Pulse: 97 (Simultaneous filing. User may not have seen previous data.) (03/01/18 0758)  Resp: 16 (03/01/18 0758)  BP: 131/67 (03/01/18 0758)  SpO2: 98 % (03/01/18 0758) Vital Signs (24h Range):  Temp:  [98 °F (36.7 °C)-99.2 °F (37.3 °C)] 98.8 °F (37.1 °C)  Pulse:  [] 97  Resp:  [16-31] 16  SpO2:  [91 %-98 %] 98 %  BP:  (123-147)/(58-78) 131/67     Weight: 76 kg (167 lb 8.8 oz)  Body mass index is 24.74 kg/m².     SpO2: 98 %  O2 Device (Oxygen Therapy): nasal cannula      Intake/Output Summary (Last 24 hours) at 03/01/18 0921  Last data filed at 03/01/18 0700   Gross per 24 hour   Intake              498 ml   Output             1425 ml   Net             -927 ml       Lines/Drains/Airways     Drain                 Urethral Catheter 02/15/18 16 Fr. 14 days          Peripheral Intravenous Line                 Peripheral IV - Single Lumen 02/27/18 0515 Right Forearm 2 days         Peripheral IV - Single Lumen 02/28/18 1736 Left Wrist less than 1 day                Physical Exam   Constitutional: He is oriented to person, place, and time. He appears well-developed and well-nourished. He appears ill. No distress.   HENT:   Head: Normocephalic and atraumatic.   Eyes: EOM are normal. No scleral icterus.   Right eye conjunctival redness.    Neck: Neck supple.   Cardiovascular: Regular rhythm and intact distal pulses.    Murmur (systolic at the mitral area. ) heard.  Pulmonary/Chest: He is in respiratory distress. He has no wheezes. He has no rales.   On 3 L comfort flow nc.    Musculoskeletal: He exhibits no edema.   Neurological: He is alert and oriented to person, place, and time.   Skin: Skin is warm and dry. He is not diaphoretic. There is pallor.   No splinter hemorrhage or clubbing.    Psychiatric: He has a normal mood and affect.       Significant Labs:   CMP   Recent Labs  Lab 02/28/18  0331 02/28/18  1214 02/28/18 2000 03/01/18  0503    140 142  --    K 4.2 3.9 3.9  --     105 106  --    CO2 24 28 29  --    * 139* 146*  --    BUN 32* 31* 34*  --    CREATININE 1.5* 1.2 1.4  --    CALCIUM 9.0 9.0 8.9  --    PROT 6.7  --   --  6.0   ALBUMIN 2.4*  2.4* 2.3* 2.3* 2.4*   BILITOT 1.6*  --   --  1.3*   ALKPHOS 74  --   --  68   AST 31  --   --  51*   ALT 28  --   --  39   ANIONGAP 12 7* 7*  --    ESTGFRAFRICA 54.9*  >60.0 59.7*  --    EGFRNONAA 47.5* >60.0 51.6*  --     and CBC   Recent Labs  Lab 02/28/18  0331 03/01/18  0503   WBC 8.36 8.02   HGB 7.6* 7.0*   HCT 23.6* 22.0*    155       Significant Imaging: X-Ray: CXR: X-Ray Chest 1 View (CXR):   Results for orders placed or performed during the hospital encounter of 02/21/18   X-Ray Chest 1 View    Narrative    One view: There is cardiomegaly, moderate edema, no change.      Electronically signed by: EUGENE PENG MD  Date:     02/27/18  Time:    08:03      Assessment and Plan:     Brief HPI: 67 y.o. male with HTN, BPH with recent urinary retention requiring indwelling lackey, anemia. He was sent to the ED by his PCP after he was found to have low Hb on routine labs.   Cardiology was consulted for abnormal mitral valve with a mobile density on recent TTE in the setting of sepsis and heart failure.     Subacute infective endocarditis    Patient is presenting with recurrent anemia. During his hospitalization he received 2 units of PRBCs with appropriate response. He developed hypoxemic respiratory failure on 2/25 and was transferred to MICU. CT chest with multiple pulmonary nodules. CXR with bilateral infiltrates. Echo with mitral regurgitation and mobile density/thickening.     - SOWMYA on 2/27 showed sever mitral and aortic regurgitation and vegetations.   - Blood Cx x 1 from 2/25 : Enterococcus fecalis. Waiting on sensitivity.   - Repeat blood cx on 2/27 with one positive culture GPC resembling strept.   - ID team is following. Will defer antibiotic management to them.   - Repeat 2D Echo in 4-6 weeks for reevaluation of aortic and mitral valve function. Will defer decision of valve replacement to CT surgery.             VTE Risk Mitigation         Ordered     enoxaparin injection 40 mg  Daily     Route:  Subcutaneous        02/26/18 1935     Medium Risk of VTE  Once      02/22/18 0611     Place sequential compression device  Until discontinued      02/22/18 0343     Place  MOHAN hose  Until discontinued      02/22/18 9779        - Will sign off. Please call for any further questions. Case was discussed with attending cardiologist Dr. Salmon.     Meera Mathew MD  Cardiology  [facility]

## 2018-03-01 NOTE — ASSESSMENT & PLAN NOTE
68 yo male with HTN who presented with symptomatic anemia. Had a respiratory decompensation on floor and transferred to ICU. SOWMYA showed aortic and mitral valve vegetations suggestive of bacterial endocarditis.     - Blood culture from 2/25 growing Enterococcus Faecalis. Repeat BCx 2/27 growing gram+ cocci in chains   - Continue Vanc and Unasyn while sensitivities are pending  - Repeat blood cultures every other day until they clear   - Defer to CTS/Cards for valvular management

## 2018-03-01 NOTE — PLAN OF CARE
Problem: Physical Therapy Goal  Goal: Physical Therapy Goal  Goals to be met by: 10 days (3/6)    Patient will increase functional independence with mobility by performin. Sit to stand transfer with Modified [place]  2. Gait  x 200 feet with Modified [place] using LRAD  3. Lower extremity exercise program x15 reps in standing, with independence to improve balance, muscular strength and endurance w/ assistance as needed.          Outcome: Ongoing (interventions implemented as appropriate)  PT goals re-activated following re-eval  Follow through w/ patient POC    Luís Rocha, PT  3/1/2018

## 2018-03-01 NOTE — PROGRESS NOTES
Progress Note   Hospital Medicine       Team: [facility] MED B John Agudelo MD  Admit Date: 2/21/2018  Length of Stay:  LOS: 8 days   ELIEL 3/2/2018  Principal Problem:  Acute hypoxemic respiratory failure    Interval hx / overnight events:  Feeling well.    Areas of concern/ handoff:    Review of Systems   Constitutional: Positive for fever and weight loss.   HENT: Negative.    Eyes: Negative.    Cardiovascular: Negative.    Gastrointestinal: Negative.    Genitourinary: Negative.    Musculoskeletal: Negative.    Skin: Negative.    Neurological: Negative.    Endo/Heme/Allergies: Negative.        Temp:  [98 °F (36.7 °C)-99.2 °F (37.3 °C)]   Pulse:  []   Resp:  [16-31]   BP: (123-147)/(63-78)   SpO2:  [91 %-98 %]       Physical Exam   Constitutional: He is oriented to person, place, and time and well-developed, well-nourished, and in no distress.   HENT:   Head: Normocephalic and atraumatic.   Eyes: Conjunctivae are normal. Pupils are equal, round, and reactive to light.   Neck: Normal range of motion. Neck supple.   Cardiovascular: Normal rate and regular rhythm.    Pulmonary/Chest: Effort normal and breath sounds normal.   Abdominal: Soft. Bowel sounds are normal.   Neurological: He is alert and oriented to person, place, and time.   Skin: Skin is warm and dry.       Recent Labs  Lab 02/24/18  0423 02/25/18  0226 02/26/18  0315 02/27/18  0409 02/28/18  0331 03/01/18  0503   WBC 9.74 11.61 10.32 7.63 8.36 8.02   HGB 8.4* 8.2* 8.3* 7.4* 7.6* 7.0*   HCT 25.8* 25.2* 25.1* 22.8* 23.6* 22.0*    162 166 177 180 155       Recent Labs  Lab 02/28/18  0331 02/28/18  1214 02/28/18  2000 03/01/18  0503    140 142  --    K 4.2 3.9 3.9  --     105 106  --    CO2 24 28 29  --    BUN 32* 31* 34*  --    CREATININE 1.5* 1.2 1.4  --    * 139* 146*  --    CALCIUM 9.0 9.0 8.9  --    MG 2.0 2.0  --  1.9   PHOS 4.3 3.5 3.5  --        Recent Labs  Lab 02/27/18  0409 02/28/18  0331 02/28/18  1214 02/28/18 2000  03/01/18  0503   ALKPHOS 65 74  --   --  68   ALT 29 28  --   --  39   AST 31 31  --   --  51*   ALBUMIN 2.3* 2.4*  2.4* 2.3* 2.3* 2.4*   PROT 6.3 6.7  --   --  6.0   BILITOT 1.4* 1.6*  --   --  1.3*     No results for input(s): POCTGLUCOSE in the last 168 hours.        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]  Yes    Mitral and aortic valve regurgitation [I08.0]  Yes    Pulmonary hypertension due to mitral valve disease [I27.22, I05.9]  Yes    Subacute bacterial endocarditis [I33.0]  Yes    Acute cardiogenic pulmonary edema [I50.1]  Yes    Endocarditis [I38]  Yes    Subacute infective endocarditis [I33.0]  No    Symptomatic anemia [D64.9]  Yes    ANNA (acute kidney injury) [N17.9]  Yes    Chronic cough [R05]  Yes    Pulmonary nodules [R91.8]  Yes    Anemia [D64.9]  Yes    Anxiety [F41.9]  Yes    HTN (hypertension) [I10]  Yes     Chronic      Resolved Hospital Problems    Diagnosis Date Resolved POA    Asymptomatic bacteriuria [R82.71] 02/27/2018 Yes    Shortness of breath [R06.02] 02/27/2018 Yes    Mild malnutrition [E44.1] 02/27/2018 Yes            Anxiety     --holding home alprazolam given tenuous respiratory status. No reports of anxiety               * Acute hypoxemic respiratory failure     --2/2 cardiogenic pulmonary edema given endocarditis with moderate/severe MR and AI in the setting of having received >3 L crystalloids, 2 units PRBC's during first days  of admission.  --continuing to diurese and oxygen requirements slowly improving; continue to wean HFNC as tolerated for goal saturations of 90%  --Continue empiric coverage with Vancomycin, Unasyn, and Azithromycin for now (started 2/25) but can likely de-escalate azithro. Sputum culture from 2/25 not performed as inadequate specimen. Not currently with productive cough so hold on recollect  --concern for possible DAH previously given concurrent anemia with acute worsening hypoxemia/bilateral infiltrates. However, now with scant amount of hemoptysis. MAGNO negative. Awaiting ANCA, glomerular basement membrane antibody             Pulmonary nodules     --unclear etiology; inflammation, infection, vs other  --non-smoker, h/o occupational asbestos exposure  --repeat CT in 3-6 months                  r   HTN (hypertension)     --continue home amlodipine          Acute cardiogenic pulmonary edema     --2/2 MR and AI. See above          Subacute infective endocarditis     --appreciate cardiology's and CTS assistance. See above for plan           Aortic valve endocarditis     --noted on SOWMYA 2/27. Along with mitral valve endocarditis and associated AI, MR.  --one blood culture from 2/25 with GPC's awaiting speciation. Appreciate ID assistance. Will continue Vanco, Unasyn for now until speciation/sensitivity. Repeat cultures from 2/27 NGTD. Afebrile, without leukocytosis  --CTS consulted and recommending finishing Abx therapy, optimize volume status and follow up with CTS when stable.   --unclear initial source of infection           Mitral and aortic valve regurgitation     --2/2 endocarditis. See below  --optimize volume status as tolerated                ANNA (acute kidney injury)     --previously attributed to bactrim (started for proteus UTI), now d/c'd; baseline creatinine ~ 1.1  --Renal US with evidence of medical renal disease  --creatinine slowly improving with diuresis. Continue to trend                Anemia     --no  evidence of ongoing GIB, previous EGD and C scope without source of bleeding. Minimal amounts of hemoptysis currently.   --hemolytic labs negative. Inappropriately low reticulocytes  --s/p 2 units this admission (on 2/22 and 2/23). Continue to monitor and transfuse for Hgb <7     John Agudelo

## 2018-03-01 NOTE — NURSING TRANSFER
Nursing Transfer Note      2/28/2018     Transfer To: 928     Transfer via wheelchair    Transfer with 3L/NC  to O2, cardiac monitoring    Transported by RN    Medicines sent: yes    Chart send with patient: Yes    Notified: daughter at bedside    Upon arrival to floor: cardiac monitor applied, patient oriented to room, call bell in reach and bed in lowest position

## 2018-03-02 LAB
ALBUMIN SERPL BCP-MCNC: 2.3 G/DL
ALBUMIN SERPL BCP-MCNC: 2.4 G/DL
ALBUMIN SERPL BCP-MCNC: 2.5 G/DL
ALP SERPL-CCNC: 66 U/L
ALT SERPL W/O P-5'-P-CCNC: 71 U/L
ANION GAP SERPL CALC-SCNC: 11 MMOL/L
ANION GAP SERPL CALC-SCNC: 7 MMOL/L
AST SERPL-CCNC: 94 U/L
BACTERIA #/AREA URNS AUTO: ABNORMAL /HPF
BACTERIA BLD CULT: NORMAL
BASOPHILS # BLD AUTO: 0.03 K/UL
BASOPHILS NFR BLD: 0.4 %
BILIRUB DIRECT SERPL-MCNC: 0.6 MG/DL
BILIRUB SERPL-MCNC: 1.1 MG/DL
BILIRUB UR QL STRIP: NEGATIVE
BUN SERPL-MCNC: 37 MG/DL
BUN SERPL-MCNC: 45 MG/DL
CALCIUM SERPL-MCNC: 8.8 MG/DL
CALCIUM SERPL-MCNC: 8.9 MG/DL
CHLORIDE SERPL-SCNC: 105 MMOL/L
CHLORIDE SERPL-SCNC: 108 MMOL/L
CLARITY UR REFRACT.AUTO: ABNORMAL
CO2 SERPL-SCNC: 25 MMOL/L
CO2 SERPL-SCNC: 28 MMOL/L
COLOR UR AUTO: ABNORMAL
CREAT SERPL-MCNC: 1.2 MG/DL
CREAT SERPL-MCNC: 1.4 MG/DL
DIFFERENTIAL METHOD: ABNORMAL
ENTEROVIRUS: NOT DETECTED
EOSINOPHIL # BLD AUTO: 0.9 K/UL
EOSINOPHIL NFR BLD: 10.9 %
ERYTHROCYTE [DISTWIDTH] IN BLOOD BY AUTOMATED COUNT: 19.1 %
EST. GFR  (AFRICAN AMERICAN): 59.7 ML/MIN/1.73 M^2
EST. GFR  (AFRICAN AMERICAN): >60 ML/MIN/1.73 M^2
EST. GFR  (NON AFRICAN AMERICAN): 51.6 ML/MIN/1.73 M^2
EST. GFR  (NON AFRICAN AMERICAN): >60 ML/MIN/1.73 M^2
GLUCOSE SERPL-MCNC: 122 MG/DL
GLUCOSE SERPL-MCNC: 96 MG/DL
GLUCOSE UR QL STRIP: NEGATIVE
HCT VFR BLD AUTO: 22.4 %
HGB BLD-MCNC: 7.1 G/DL
HGB UR QL STRIP: ABNORMAL
HUMAN BOCAVIRUS: NOT DETECTED
HUMAN CORONAVIRUS, COMMON COLD VIRUS: NOT DETECTED
HYALINE CASTS UR QL AUTO: 0 /LPF
IMM GRANULOCYTES # BLD AUTO: 0.03 K/UL
IMM GRANULOCYTES NFR BLD AUTO: 0.4 %
INFLUENZA A - H1N1-09: NOT DETECTED
KETONES UR QL STRIP: NEGATIVE
LEUKOCYTE ESTERASE UR QL STRIP: NEGATIVE
LYMPHOCYTES # BLD AUTO: 1 K/UL
LYMPHOCYTES NFR BLD: 13.4 %
MAGNESIUM SERPL-MCNC: 2 MG/DL
MCH RBC QN AUTO: 26.7 PG
MCHC RBC AUTO-ENTMCNC: 31.7 G/DL
MCV RBC AUTO: 84 FL
MICROSCOPIC COMMENT: ABNORMAL
MONOCYTES # BLD AUTO: 0.5 K/UL
MONOCYTES NFR BLD: 6.7 %
NEUTROPHILS # BLD AUTO: 5.3 K/UL
NEUTROPHILS NFR BLD: 68.2 %
NITRITE UR QL STRIP: NEGATIVE
NRBC BLD-RTO: 0 /100 WBC
PARAINFLUENZA: NOT DETECTED
PH UR STRIP: 7 [PH] (ref 5–8)
PHOSPHATE SERPL-MCNC: 3.9 MG/DL
PHOSPHATE SERPL-MCNC: 4.2 MG/DL
PLATELET # BLD AUTO: 179 K/UL
PMV BLD AUTO: 11.1 FL
POTASSIUM SERPL-SCNC: 4.1 MMOL/L
POTASSIUM SERPL-SCNC: 4.2 MMOL/L
PROT SERPL-MCNC: 6.2 G/DL
PROT UR QL STRIP: ABNORMAL
RBC # BLD AUTO: 2.66 M/UL
RBC #/AREA URNS AUTO: >100 /HPF (ref 0–4)
RVP - ADENOVIRUS: NOT DETECTED
RVP - HUMAN METAPNEUMOVIRUS (HMPV): NOT DETECTED
RVP - INFLUENZA A: NOT DETECTED
RVP - INFLUENZA B: NOT DETECTED
RVP - RESPIRATORY SYNCTIAL VIRUS (RSV) A: NOT DETECTED
RVP - RESPIRATORY VIRAL PANEL, SOURCE: NORMAL
RVP - RHINOVIRUS: NOT DETECTED
SODIUM SERPL-SCNC: 141 MMOL/L
SODIUM SERPL-SCNC: 143 MMOL/L
SP GR UR STRIP: 1.01 (ref 1–1.03)
SQUAMOUS #/AREA URNS AUTO: 0 /HPF
URN SPEC COLLECT METH UR: ABNORMAL
UROBILINOGEN UR STRIP-ACNC: NEGATIVE EU/DL
WBC # BLD AUTO: 7.79 K/UL
WBC #/AREA URNS AUTO: 0 /HPF (ref 0–5)

## 2018-03-02 PROCEDURE — 63600175 PHARM REV CODE 636 W HCPCS: Performed by: INTERNAL MEDICINE

## 2018-03-02 PROCEDURE — 83735 ASSAY OF MAGNESIUM: CPT

## 2018-03-02 PROCEDURE — 63600175 PHARM REV CODE 636 W HCPCS: Performed by: NURSE PRACTITIONER

## 2018-03-02 PROCEDURE — 80076 HEPATIC FUNCTION PANEL: CPT

## 2018-03-02 PROCEDURE — 97110 THERAPEUTIC EXERCISES: CPT

## 2018-03-02 PROCEDURE — 25000003 PHARM REV CODE 250: Performed by: HOSPITALIST

## 2018-03-02 PROCEDURE — 11000001 HC ACUTE MED/SURG PRIVATE ROOM

## 2018-03-02 PROCEDURE — 87086 URINE CULTURE/COLONY COUNT: CPT

## 2018-03-02 PROCEDURE — 99232 SBSQ HOSP IP/OBS MODERATE 35: CPT | Mod: GC,,, | Performed by: INTERNAL MEDICINE

## 2018-03-02 PROCEDURE — 25000003 PHARM REV CODE 250: Performed by: NURSE PRACTITIONER

## 2018-03-02 PROCEDURE — 85025 COMPLETE CBC W/AUTO DIFF WBC: CPT

## 2018-03-02 PROCEDURE — 80069 RENAL FUNCTION PANEL: CPT | Mod: 91

## 2018-03-02 PROCEDURE — 36415 COLL VENOUS BLD VENIPUNCTURE: CPT

## 2018-03-02 PROCEDURE — 99233 SBSQ HOSP IP/OBS HIGH 50: CPT | Mod: ,,, | Performed by: INTERNAL MEDICINE

## 2018-03-02 PROCEDURE — 81001 URINALYSIS AUTO W/SCOPE: CPT

## 2018-03-02 PROCEDURE — 25000003 PHARM REV CODE 250: Performed by: INTERNAL MEDICINE

## 2018-03-02 PROCEDURE — 97530 THERAPEUTIC ACTIVITIES: CPT

## 2018-03-02 RX ORDER — METHOCARBAMOL 500 MG/1
500 TABLET, FILM COATED ORAL 4 TIMES DAILY PRN
Status: DISCONTINUED | OUTPATIENT
Start: 2018-03-02 | End: 2018-03-07 | Stop reason: HOSPADM

## 2018-03-02 RX ADMIN — VANCOMYCIN HYDROCHLORIDE 1250 MG: 1 INJECTION, POWDER, LYOPHILIZED, FOR SOLUTION INTRAVENOUS at 09:03

## 2018-03-02 RX ADMIN — GUAIFENESIN AND DEXTROMETHORPHAN 5 ML: 100; 10 SYRUP ORAL at 12:03

## 2018-03-02 RX ADMIN — TAMSULOSIN HYDROCHLORIDE 0.4 MG: 0.4 CAPSULE ORAL at 08:03

## 2018-03-02 RX ADMIN — FUROSEMIDE 40 MG: 10 INJECTION, SOLUTION INTRAMUSCULAR; INTRAVENOUS at 06:03

## 2018-03-02 RX ADMIN — BENZONATATE 100 MG: 100 CAPSULE ORAL at 06:03

## 2018-03-02 RX ADMIN — FUROSEMIDE 40 MG: 10 INJECTION, SOLUTION INTRAMUSCULAR; INTRAVENOUS at 09:03

## 2018-03-02 RX ADMIN — MIRTAZAPINE 30 MG: 15 TABLET, FILM COATED ORAL at 08:03

## 2018-03-02 RX ADMIN — AMPICILLIN SODIUM AND SULBACTAM SODIUM 3 G: 2; 1 INJECTION, POWDER, FOR SOLUTION INTRAMUSCULAR; INTRAVENOUS at 01:03

## 2018-03-02 RX ADMIN — Medication 1 SPRAY: at 12:03

## 2018-03-02 RX ADMIN — AMPICILLIN SODIUM AND SULBACTAM SODIUM 3 G: 2; 1 INJECTION, POWDER, FOR SOLUTION INTRAMUSCULAR; INTRAVENOUS at 11:03

## 2018-03-02 RX ADMIN — AMLODIPINE BESYLATE 10 MG: 10 TABLET ORAL at 09:03

## 2018-03-02 RX ADMIN — AMPICILLIN SODIUM AND SULBACTAM SODIUM 3 G: 2; 1 INJECTION, POWDER, FOR SOLUTION INTRAMUSCULAR; INTRAVENOUS at 08:03

## 2018-03-02 RX ADMIN — FLUTICASONE PROPIONATE 50 MCG: 50 SPRAY, METERED NASAL at 09:03

## 2018-03-02 RX ADMIN — ENOXAPARIN SODIUM 40 MG: 100 INJECTION SUBCUTANEOUS at 06:03

## 2018-03-02 RX ADMIN — OXYCODONE HYDROCHLORIDE 5 MG: 5 TABLET ORAL at 08:03

## 2018-03-02 RX ADMIN — GUAIFENESIN AND DEXTROMETHORPHAN 5 ML: 100; 10 SYRUP ORAL at 08:03

## 2018-03-02 NOTE — PROGRESS NOTES
[facility]  Infectious Disease  Progress Note    Patient Name: Rodolfo Messina  MRN: [id number]  Admission Date: 2/21/2018  Length of Stay: 9 days  Attending Physician: John Agudelo MD  Primary Care Provider: Deric Claudio MD    Isolation Status: No active isolations  Assessment/Plan:      Subacute infective endocarditis    66 yo male with HTN who presented with symptomatic anemia. Had a respiratory decompensation on floor and transferred to ICU. SOWMYA showed aortic and mitral valve vegetations suggestive of bacterial endocarditis.     - Blood culture from 2/25 growing Enterococcus Faecalis, sensitive to ampicillin Positive Bcx on 2/27. BCx 3/1 NGTD  - D/c Vanc and continue Unasyn. Plan to treat for 4-6 weeks from first negative blood culture  - Repeat blood cultures every other day until they clear   - Defer to CTS/Cards for valvular management               Thank you for your consult. I will follow-up with patient. Please contact us if you have any additional questions.    Zain Niño MD  Infectious Disease  [facility]    Subjective:     Principal Problem:Acute hypoxemic respiratory failure    HPI:  Mr. Messina is a 67 year old man with HTN, urinary retention (indwelling lackey), chronic back pain, essential tremor, anxiety and depression who was initially admitted with recurrent anemia (Hgb 6.7 on admit). Evaluated by GI who did not think patient was having an upper or lower GI bleed. He was also evaluated by hem-onc who recommended any outpatient bone marrow biopsy. He received 2 units of pRBC while admitted. Patient states that he has had a chronic cough for the 2 months and occasional fevers for which he received steroids and antibiotics. CTA chest on admit shows multiple nodules and micronodular clusters throughout the right lung, consistent with small airway infectious versus noninfectious inflammation but not excluding malignancy (patient has history of  occupational asbestos exposure and smoking). Quantiferon gold was indeterminate. He was evaluated by pulmonary who recommend duo-nebs and outpatient follow-up.  He was also seen by ID for recent UTI who recommend augmentin x 7 days. On 2/24, patient developed acute respiratory failure required up to 30 L O2 and was transferred to ICU. CXR showed  interval development of bilateral patchy interstitial and parietal attenuation reflective of edema vs infection. He was started on vanc, unasyn, and azithromycin. 2D echo revealed evidence of aortic valve vegetation. SOWMYA performed on 2/27 showed vegetation on aortic and mitral valve associated with severe aortic and mitral regurg. Blood culture from 2/26 grew gram pos cocci in 1 of 4 bottles.     Patient denies any recent dental work. He did receive blood transfusions within the last month related to anemia. No recent travel. Patient does have cats that have scratched him in the past.  Interval History: No acute events. Afebrile. Enterococcus sensitive to ampicillin.     Review of Systems   Constitutional: Negative for chills and fever.   HENT: Negative for nosebleeds, sore throat and trouble swallowing.    Respiratory: Positive for cough and shortness of breath. Negative for chest tightness and wheezing.    Cardiovascular: Negative for chest pain and palpitations.   Gastrointestinal: Negative for abdominal distention, abdominal pain, blood in stool, diarrhea, nausea and vomiting.   Genitourinary: Negative for dysuria, frequency, hematuria and urgency.   Neurological: Positive for tremors. Negative for speech difficulty, weakness, light-headedness and headaches.     Objective:     Vital Signs (Most Recent):  Temp: 98.6 °F (37 °C) (03/02/18 1154)  Pulse: 95 (03/02/18 1440)  Resp: 18 (03/02/18 1154)  BP: 130/62 (03/02/18 1154)  SpO2: 97 % (03/02/18 1154) Vital Signs (24h Range):  Temp:  [96.9 °F (36.1 °C)-98.6 °F (37 °C)] 98.6 °F (37 °C)  Pulse:  [] 95  Resp:  [16-20]  18  SpO2:  [92 %-97 %] 97 %  BP: (123-146)/(58-77) 130/62     Weight: 76 kg (167 lb 8.8 oz)  Body mass index is 24.74 kg/m².    Estimated Creatinine Clearance: 59.7 mL/min (based on SCr of 1.2 mg/dL).    Physical Exam   Constitutional: He is oriented to person, place, and time. He appears well-developed and well-nourished.   HENT:   Head: Normocephalic and atraumatic.   Right Ear: External ear normal.   Left Ear: External ear normal.   No oral ulceration    Eyes: EOM are normal.   Neck: Neck supple. No JVD present.   Cardiovascular: Normal rate, regular rhythm and intact distal pulses.    Murmur (systolic mumur in 4th left intercostal space) heard.  Pulmonary/Chest: He has no wheezes. He has rales.   Mild rales at base of lungs    Abdominal: Soft. Bowel sounds are normal. He exhibits no distension. There is no tenderness.   Musculoskeletal: He exhibits no edema.   Neurological: He is alert and oriented to person, place, and time.   Skin: Skin is warm and dry. Capillary refill takes less than 2 seconds.   No obvious skin breaks     Psychiatric: He has a normal mood and affect. His behavior is normal.       Significant Labs: All pertinent labs within the past 24 hours have been reviewed.    Significant Imaging: I have reviewed all pertinent imaging results/findings within the past 24 hours.

## 2018-03-02 NOTE — PLAN OF CARE
Problem: Fall Risk (Adult)  Intervention: Reduce Risk/Promote Restraint Free Environment   18 183   Safety Interventions   Environmental Safety Modification assistive device/personal items within reach;clutter free environment maintained;lighting adjusted;room near unit station;room organization consistent   Prevent  Drop/Fall   Safety/Security Measures bed alarm set;family to remain at bedside     Intervention: Review Medications/Identify Contributors to Fall Risk   18   Safety Interventions   Medication Review/Management medications reviewed;high risk medications identified     Intervention: Patient Rounds   18 1800   Safety Interventions   Patient Rounds bed in low position;bed wheels locked;call light in reach;placement of personal items at bedside;clutter free environment maintained;ID band on;toileting offered;visualized patient     Intervention: Safety Promotion/Fall Prevention   18 1800   Safety Interventions   Safety Promotion/Fall Prevention assistive device/personal item within reach;diversional activities provided;Fall Risk reviewed with patient/family;Fall Risk signage in place;family to remain at bedside;high risk medications identified;lighting adjusted;medications reviewed;muscle strengthening facilitated;nonskid shoes/socks when out of bed;side rails raised x 2;supervised activity;instructed to call staff for mobility         Problem: Patient Care Overview  Goal: Plan of Care Review  Outcome: Ongoing (interventions implemented as appropriate)   18   Coping/Psychosocial   Plan Of Care Reviewed With patient;spouse

## 2018-03-02 NOTE — ASSESSMENT & PLAN NOTE
68 yo male with HTN who presented with symptomatic anemia. Had a respiratory decompensation on floor and transferred to ICU. SOWMYA showed aortic and mitral valve vegetations suggestive of bacterial endocarditis.     - Blood culture from 2/25 growing Enterococcus Faecalis, sensitive to ampicillin Positive Bcx on 2/27. BCx 3/1 NGTD  - D/c Vanc and continue Unasyn. Plan to treat for 4-6 weeks from first negative blood culture  - Repeat blood cultures every other day until they clear   - Defer to CTS/Cards for valvular management

## 2018-03-02 NOTE — PHYSICIAN QUERY
PT Name: Rodolfo Messina  MR #: [id number]    Physician Query Form -Systemic Infectious Process Clarification     CDS/: Damaris Hess               Contact information:  This form is a permanent document in the medical record.     Query Date: March 2, 2018     By submitting this query, we are merely seeking further clarification of documentation. Please utilize your independent clinical judgment when addressing the question(s) below.    The Medical record contains the following:     Indicators   Supporting Clinical Findings   Location in Medical Record   X HR RR BP Temp , RR 22, /86, Temp 99.1 Nursing flow sheets 2/25 0052   X Lactic Acid             Procalcitonin Procalcitonin 0.36 Labs 2/25   X WBC                Bands                     CRP WBC 7.09, 6.10  CRP 77.5 Labs 2/22  Labs 2/22   X Culture(s) Enterococcus faecalis, gram positive cocci    Gram positive cocci in chains resembling Strep   Enterococcus faecalis    Blood culture 2/25      Blood culture 2/27    AMS, Confusion, LOC, etc.     X Organ Dysfunction / Failure Critical Care consulted for worsening hypoxemic respiratory failure.     ANNA (acute kidney injury) [N17.9]Cr 1.2 --> 1.9. likley prerrena vs ATN. no improvement with  IV hydration Critical care consult 2/25          Hospital Medicine Progress note 2/23   X Bacteremia or Sepsis / Septic Cardiology was consulted for abnormal mitral valve with a mobile density on recent TTE in the setting of sepsis and heart failure Cardiology progress note 3/1   X Known or Suspected Source of Infection documented Endocarditis of mitral valve, subacute bacterial endocarditis, Subacute infective endocarditis, Asymptomatic bacteriuria,  Hospital Medicine Progress note 3/1    (Failed) Outpatient Treatment     X Medication Vancocin 1250mg IV every 24 hours  Ampicillin-sublactam 3gm IV every 8 hours MAR   X Treatment Blood culture from 2/25 growing gram positive cocci in 1/4 bottles. Low growth  likely related to previous antibiotic exposure  - Continue Vanc and Unasyn while culture is pending  - Can stop azithromycin as it is appears respiratory decompensation more related to heart than pulmonary infection   - Repeat blood cultures every other day until they clear     Infectious disease progress note 2/28    Other       Provider, please specify diagnosis or diagnoses associated with above clinical findings.    [  ] Sepsis  [ x ] Severe Sepsis with Acute Organ Dysfunction/Failure (please specify         organ dysfunction/failure): ___________________  [  ] Sepsis with Septic Shock  [  ] Other Infectious Disease (please specify): _________________________________  [  ] Other: __________________________________  [  ] Clinically Undetermined    Please document in your progress notes daily for the duration of treatment until resolved and include in your discharge summary.

## 2018-03-02 NOTE — PROGRESS NOTES
[facility]  Adult Nutrition  Progress Note    SUMMARY     Recommendations  Recommendation/Intervention:  1. Continue Cardiac diet  + FR 1200 ml + Boost Plus TID.   2. RD to follow.     Goals: consume > 85% EEN/EPN  Nutrition Goal Status: goal met   Communication of RD Recs: other (comment) (POC)    Reason for Assessment  Reason for Assessment: RD follow-up  Diagnosis: pulmonary disease (acute hypoxemic respiratory failure )  Relevent Medical History: HTN, diverticulosis, ANNA    Interdisciplinary Rounds: did not attend     General Information Comments: Pt reports improving appetite. He states that he still has some days in which his appetite is decreased. He is able to drink boost plus. He reports no N/V/D/C.     Nutrition Discharge Planning: [place] PO intake on Cardiac diet     Nutrition Prescription Ordered  Current Diet Order: Cardiac   Nutrition Order Comments: FR 1200 ml   Oral Nutrition Supplement: Boost Plus Vanilla     Evaluation of Received Nutrients/Fluid Intake    Intake/Output Summary (Last 24 hours) at 03/02/18 1430  Last data filed at 03/02/18 1100   Gross per 24 hour   Intake              500 ml   Output              950 ml   Net             -450 ml     Comments: LBM 3/02/18   % Intake of Estimated Energy Needs: 75 - 100 %  % Meal Intake: 75%     Nutrition Risk Screen  Nutrition Risk Screen: no indicators present    Nutrition/Diet History  Patient Reported Diet/Restrictions/Preferences: general  Typical Food/Fluid Intake: adequate PTA  Food Preferences: no relgious/ cultutral prefs noted   Factors Affecting Nutritional Intake: decreased appetite     Labs/Tests/Procedures/Meds  Diagnostic Test/Procedure Review: reviewed  Pertinent Labs Reviewed: reviewed, pertinent  Pertinent Labs Comments: BUN 37, Alb 2.3, Tbili 1.1, Bili .6, AST 94, ALT 71  Pertinent Medications Reviewed: reviewed, pertinent  Pertinent Medications Comments: amlodipine, enxaparin, furosemide     Physical  "Findings  Overall Physical Appearance: on oxygen therapy(nasal cannula), weak, nourished   Oral/Mouth Cavity: WDL  Skin: intact    Anthropometrics  Temp: 98.6 °F (37 °C)  Height: 5' 9" (175.3 cm)  Weight Method: Bed Scale  Weight: 76 kg (167 lb 8.8 oz)  Ideal Body Weight (IBW), Male: 160 lb   % Ideal Body Weight, Male (lb): 104.72 lb  BMI (Calculated): 24.8  BMI Grade: 18.5-24.9 - normal  Weight Loss: unintentional  Usual Body Weight (UBW), k kg  Weight Change Amount: 10 lb 2.3 oz  % Usual Body Weight: 94.36  % Weight Change From Usual Weight: -5.84 %    Estimated/Assessed Needs  Weight Used For Calorie Calculations: 76 kg (167 lb 8.8 oz)   Energy Calorie Requirements (kcal):  kcal/d  Energy Need Method: Bighorn-St Jeor (x 1.3)  RMR (Bighorn-St. Jeor Equation): 1525.38  Weight Used For Protein Calculations: 76 kg (167 lb 8.8 oz)  Protein Requirements: 84-91.4 g/d  (1.1-1.2 gm/kg)  Fluid Requirements (mL): per MD  Fluid Need Method: RDA Method  RDA Method (mL):   CHO Requirement: NA     Assessment and Plan  Mild malnutrition     Malnutrition in the context of Acute Illness/Injury     Related to (etiology):  ANNA     Signs and Symptoms (as evidenced by):  Energy Intake: <75% of estimated energy requirement for 1mo  Body Fat Depletion: mild depletion of triceps and thoracic and lumbar region   Muscle Mass Depletion: mild depletion of scapular region, interosseous muscle and lower extremities   Weight Loss: 10% x 1mo   Fluid Accumulation: mild  Reduced  Strength: none     Interventions/Recommendations (treatment strategy):  See recs above     Nutrition Diagnosis Status:  Improving      Monitor and Evaluation  Food and Nutrient Intake: energy intake, food and beverage intake  Food and Nutrient Adminstration: diet order   Physical Activity and Function: nutrition-related ADLs and IADLs  Anthropometric Measurements: weight, weight change  Biochemical Data, Medical Tests and Procedures:  (All " labs)  Nutrition-Focused Physical Findings: overall appearance    Nutrition Risk  Level of Risk:  (f/u 1x/wk)    Nutrition Follow-Up  RD Follow-up?: Yes

## 2018-03-02 NOTE — PLAN OF CARE
Problem: Fall Risk (Adult)  Goal: Absence of Falls  Patient will demonstrate the desired outcomes by discharge/transition of care.   Outcome: Ongoing (interventions implemented as appropriate)   03/02/18 1721   Fall Risk (Adult)   Absence of Falls making progress toward outcome       Problem: Patient Care Overview  Goal: Plan of Care Review  Outcome: Ongoing (interventions implemented as appropriate)   03/02/18 1721   Coping/Psychosocial   Plan Of Care Reviewed With patient;spouse       Problem: Breathing Pattern Ineffective (Adult)  Goal: Anxiety/Fear Reduction  Patient will demonstrate the desired outcomes by discharge/transition of care.   Outcome: Ongoing (interventions implemented as appropriate)   03/02/18 1721   Breathing Pattern Ineffective (Adult)   Anxiety/Fear Reduction making progress toward outcome

## 2018-03-02 NOTE — PT/OT/SLP PROGRESS
Occupational Therapy   Treatment    Name: Rodolfo Messina  MRN: [id number]  Admitting Diagnosis:  Acute hypoxemic respiratory failure  3 Days Post-Op    Recommendations:     Discharge Recommendations: home with home health  Discharge Equipment Recommendations:  cane, straight  Barriers to discharge:  None    Subjective     Communicated with: Rn prior to session.  Pain/Comfort:  · Pain Rating 1: 0/10    Patients cultural, spiritual, Zoroastrianism conflicts given the current situation: none stated     Objective:     Patient found with: telemetry, oxygen, lackey catheter, peripheral IV (all lines intact )    General Precautions: Standard, fall   Orthopedic Precautions:N/A   Braces: N/A     Occupational Performance:    Bed Mobility:    · Patient completed Rolling/Turning to Right with stand by assistance  · Patient completed Scooting/Bridging with stand by assistance  · Patient completed Supine to Sit with stand by assistance  · Patient completed Sit to Supine with stand by assistance     Functional Mobility/Transfers:  · Patient completed Sit <> Stand Transfer with contact guard assistance  with  no assistive device   · Functional Mobility: Pt able to demonstrate effective hallway mobility while managing IV pole    Activities of Daily Living:  · UB Dressing: contact guard assistance    · LB Dressing: minimum assistance      Patient left up in chair with all lines intact    AMPA 6 Click:  AMPAC Total Score: 19    Treatment & Education:  -Pt educated on safety, role of OT, importance of increased participation in self care for gains , expectations for participation, expectations for gains, POC, energy conservation, caregiver strain.   -Upper extremity exercises provided for improved functional performance. 30 forward punches, 30 upward punches 3x a day  - Functional mobility and Uand LE dressing with CGA  Education:    Assessment:     Rodolfo Messina is a 67 y.o. male with a medical diagnosis of Acute hypoxemic respiratory  failure.  He presents with improving endurance and performance with self care and mobility .  Performance deficits affecting function are impaired endurance, impaired self care skills, impaired functional mobilty, gait instability.      Rehab Prognosis: good; patient would benefit from acute skilled OT services to address these deficits and reach maximum level of function.       Plan:     Patient to be seen 3 x/week to address the above listed problems via self-care/home management, therapeutic activities, therapeutic exercises  · Plan of Care Expires:    · Plan of Care Reviewed with: patient, spouse    This Plan of care has been discussed with the patient who was involved in its development and understands and is in agreement with the identified goals and treatment plan    GOALS:    Occupational Therapy Goals        Problem: Occupational Therapy Goal    Goal Priority Disciplines Outcome Interventions   Occupational Therapy Goal     OT, PT/OT Ongoing (interventions implemented as appropriate)    Description:  Goals to be met by: 3/10/2018    Patient will increase functional independence with ADLs by performing:    UE Dressing with Modified [place].  LE Dressing with Modified [place].  Grooming while standing with Modified [place].  Toileting from toilet with Modified [place] for hygiene and clothing management.   Bathing from  edge of bed with Modified [place].                      Time Tracking:     OT Date of Treatment: 03/02/18  OT Start Time: 1032  OT Stop Time: 1055  OT Total Time (min): 23 min    Billable Minutes:Therapeutic Activity 12  Therapeutic Exercise 11    Kera Dobson, OT  3/2/2018

## 2018-03-02 NOTE — PLAN OF CARE
Problem: Patient Care Overview  Goal: Plan of Care Review  Outcome: Ongoing (interventions implemented as appropriate)     Recommendations  Recommendation/Intervention:  1. Continue Cardiac diet  + FR 1200 ml + Boost Plus TID.   2. RD to follow.      Goals: consume > 85% EEN/EPN  Nutrition Goal Status: goal met

## 2018-03-02 NOTE — PLAN OF CARE
Problem: Occupational Therapy Goal  Goal: Occupational Therapy Goal  Goals to be met by: 3/10/2018    Patient will increase functional independence with ADLs by performing:    UE Dressing with Modified [place].  LE Dressing with Modified [place].  Grooming while standing with Modified [place].  Toileting from toilet with Modified [place] for hygiene and clothing management.   Bathing from  edge of bed with Modified [place].     Outcome: Ongoing (interventions implemented as appropriate)  Goals addressed and unmet.  Cont with POC  Kera Dobson OT  3/2/2018

## 2018-03-02 NOTE — PROGRESS NOTES
Progress Note   Hospital Medicine       Team: [facility] HOSP MED B John Agudelo MD  Admit Date: 2/21/2018  Length of Stay:  LOS: 9 days   ELIEL 3/5/2018  Principal Problem:  Acute hypoxemic respiratory failure    Interval hx / overnight events: Repeatt BC are negative so far, will dc darya    Areas of concern/ handoff:    Review of Systems   HENT: Negative.    Eyes: Negative.    Cardiovascular: Negative.    Gastrointestinal: Negative.    Genitourinary: Negative.    Musculoskeletal: Negative.    Skin: Negative.    Endo/Heme/Allergies: Negative.        Temp:  [96.9 °F (36.1 °C)-98.6 °F (37 °C)]   Pulse:  []   Resp:  [16-20]   BP: (123-146)/(58-77)   SpO2:  [91 %-96 %]       Physical Exam   Constitutional: He is oriented to person, place, and time.   HENT:   Head: Normocephalic and atraumatic.   Eyes: Conjunctivae are normal. Pupils are equal, round, and reactive to light.   Neck: Normal range of motion. Neck supple.   Cardiovascular: Regular rhythm.    Pulmonary/Chest: He has wheezes.   Abdominal: Soft. Bowel sounds are normal.   Musculoskeletal: Normal range of motion.   Neurological: He is alert and oriented to person, place, and time.   Skin: Skin is warm.       Recent Labs  Lab 02/25/18  0226 02/26/18  0315 02/27/18  0409 02/28/18  0331 03/01/18  0503 03/02/18  0400   WBC 11.61 10.32 7.63 8.36 8.02 7.79   HGB 8.2* 8.3* 7.4* 7.6* 7.0* 7.1*   HCT 25.2* 25.1* 22.8* 23.6* 22.0* 22.4*    166 177 180 155 179       Recent Labs  Lab 02/28/18  1214  03/01/18  0503 03/01/18  0914 03/01/18  2021 03/02/18  0400 03/02/18  0750     < >  --  142 143  --  143   K 3.9  < >  --  3.8 3.9  --  4.1     < >  --  107 105  --  108   CO2 28  < >  --  26 27  --  28   BUN 31*  < >  --  34* 39*  --  37*   CREATININE 1.2  < >  --  1.3 1.4  --  1.2   *  < >  --  131* 119*  --  96   CALCIUM 9.0  < >  --  9.2 9.2  --  8.8   MG 2.0  --  1.9  --   --  2.0  --    PHOS 3.5  < >  --  3.9 4.2  --  4.2   < > = values in this  interval not displayed.    Recent Labs  Lab 02/28/18  0331  03/01/18  0503  03/01/18 2021 03/02/18  0400 03/02/18  0750   ALKPHOS 74  --  68  --   --  66  --    ALT 28  --  39  --   --  71*  --    AST 31  --  51*  --   --  94*  --    ALBUMIN 2.4*  2.4*  < > 2.4*  < > 2.7* 2.5* 2.3*   PROT 6.7  --  6.0  --   --  6.2  --    BILITOT 1.6*  --  1.3*  --   --  1.1*  --    < > = values in this interval not displayed.  No results for input(s): POCTGLUCOSE in the last 168 hours.        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]  Yes    Mitral and aortic valve regurgitation [I08.0]  Yes    Pulmonary hypertension due to mitral valve disease [I27.22, I05.9]  Yes    Subacute bacterial endocarditis [I33.0]  Yes    Acute cardiogenic pulmonary edema [I50.1]  Yes    Endocarditis [I38]  Yes    Subacute infective endocarditis [I33.0]  No    Symptomatic anemia [D64.9]  Yes    ANNA (acute kidney injury) [N17.9]  Yes    Chronic cough [R05]  Yes    Pulmonary nodules [R91.8]  Yes    Anemia [D64.9]  Yes    Anxiety [F41.9]  Yes    HTN (hypertension) [I10]  Yes     Chronic      Resolved Hospital Problems    Diagnosis Date Resolved POA    Asymptomatic bacteriuria [R82.71] 02/27/2018 Yes    Shortness of breath [R06.02] 02/27/2018 Yes    Mild malnutrition [E44.1] 02/27/2018 Yes       Acute hypoxemic respiratory failure     --2/2 cardiogenic pulmonary edema given endocarditis with moderate/severe  MR and AI in the setting of having received >3 L crystalloids, 2 units PRBC's during first days of admission.  --continuing to diurese and oxygen requirements slowly improving; continue to wean HFNC as tolerated for goal saturations of 90%  --Continue empiric coverage with Vancomycin, Unasyn, and Azithromycin for now (started 2/25) but can likely de-escalate azithro. Sputum culture from 2/25 not performed as inadequate specimen. Not currently with productive cough so hold on recollect  --concern for possible DAH previously given concurrent anemia with acute worsening hypoxemia/bilateral infiltrates. However, now with scant amount of hemoptysis. MAGNO negative. Awaiting ANCA, glomerular basement membrane antibody             Pulmonary nodules     --unclear etiology; inflammation, infection, vs other  --non-smoker, h/o occupational asbestos exposure  --repeat CT in 3-6 months                   r   HTN (hypertension)     --continue home amlodipine          Acute cardiogenic pulmonary edema     --2/2 MR and AI. See above          Subacute infective endocarditis     --appreciate cardiology's and CTS assistance. See above for plan           Aortic valve endocarditis     --noted on SOWMYA 2/27. Along with mitral valve endocarditis and associated AI, MR.  --one blood culture from 2/25 with GPC's awaiting speciation. Appreciate ID assistance. Will continue Vanco, Unasyn for now until speciation/sensitivity. Repeat cultures from 2/27 NGTD. Afebrile, without leukocytosis  --CTS consulted and recommending finishing Abx therapy, optimize volume status and follow up with CTS when stable.   --unclear initial source of infection           Mitral and aortic valve regurgitation     --2/2 endocarditis. See below  --optimize volume status as tolerated                  ANNA (acute kidney injury)     --previously attributed to bactrim (started for proteus UTI), now d/c'd; baseline creatinine ~ 1.1  --Renal US with evidence of medical renal  disease  --creatinine slowly improving with diuresis. Continue to trend                  Anemia     --no evidence of ongoing GIB, previous EGD and C scope without source of bleeding. Minimal amounts of hemoptysis currently.   --hemolytic labs negative. Inappropriately low reticulocytes  --s/p 2 units this admission (on 2/22 and 2/23). Continue to monitor and transfuse for Hgb <7         Time spent in care of the patient (Greater than 1/2 spent in direct face-to-face contact) 30 minutes    John Agudelo MD

## 2018-03-02 NOTE — PHYSICIAN QUERY
"PT Name: Rodolfo Messina  MR #: [id number]    Physician Query Form - Heart  Condition Clarification     CDS/: Damaris Hess RN             Contact information:[email or web address]  This form is a permanent document in the medical record.     Query Date: March 2, 2018    By submitting this query, we are merely seeking further clarification of documentation. Please utilize your independent clinical judgment when addressing the question(s) below.    The medical record contains the following   Indicators     Supporting Clinical Findings Location in Medical Record   X   443 Labs 2/24  Labs 2/25   X EF 65-70% 2D Echo 2/24   X Radiology findings There is cardiomegaly, moderate edema, and no change.    Mild interval worsening of bilateral airspace disease suggestive of pulmonary edema versus pneumonia or aspiration   CXR 2/26      CXR 2/25   X Echo Results 1 - Normal left ventricular systolic function (EF 65-70%).   2 - Normal right ventricular systolic function.   3 - Indeterminate LV diastolic function.   4 - Biatrial enlargement.   5 - Mild tricuspid regurgitation.   6 - Moderate mitral regurgitation.   7 - The mitral valve is thickened with evidence of systolic flattening and occasionally seen is a mobile echodensity that is likely evidence of endocarditis.   8 - Pulmonary hypertension. The estimated PA systolic pressure is 52 mmHg.   9 - Intermediate central venous pressure. 2D Echo 2/24    "Ascites" documented     X "SOB" or "HAQ" documented Resp- Positive for dry Cough, SOB  Progress note 2/22   X "Hypoxia" documented hypoxic overnight, started on oxygen via nasal canula   Progress note 2/24   X Heart Failure documented He was sent to the ED by his PCP after he was found to have low Hb on routine labs.   Cardiology was consulted for abnormal mitral valve with a mobile density on recent TTE in the setting of sepsis and heart failure.     Progress note Cardiology 2/28    "Edema" documented     X " Diuretics/Meds Albuterol-apratropium 2.5mg-0.5mg/3ml nebulizer every 4 hours prn wheezing, SOB    Fluticasone 50mcg 1 spray each nare 2 times daily    Furosemide 40mg IV 2 times daily     MAR - current 3/2   X Treatment: started on oxygen via nasal canula. BNP at 300. IV fluids discontinued. CX ray -lungs are symmetrically expanded bilaterally patchy increased interstitial and parietal attenuation bilaterally, may reflect edema or infection, and echocardiogram  . Duonebs changed to PRN for sinus tachycardia    Progress note 2/24    Other:          Provider, please specify diagnosis or diagnoses associated with above clinical findings.                               [  ] Acute Systolic Heart Failure ( EF < 40)*  [  ] Acute Diastolic Heart Failure ( EF > 40)*  [  ] Acute Combined Systolic and Diastolic Heart Failure  [  ] Other Type of Heart Failure (please specify type): _________________________  [  ] Heart Failure Ruled Out  [  ] Other (please specify): ___________________________________  [  ] Clinically Undetermined            *American [employer] Association                                                                                                          Please document in your progress notes daily for the duration of treatment until resolved and include in your discharge summary.

## 2018-03-02 NOTE — SUBJECTIVE & OBJECTIVE
Interval History: No acute events. Afebrile. Enterococcus sensitive to ampicillin.     Review of Systems   Constitutional: Negative for chills and fever.   HENT: Negative for nosebleeds, sore throat and trouble swallowing.    Respiratory: Positive for cough and shortness of breath. Negative for chest tightness and wheezing.    Cardiovascular: Negative for chest pain and palpitations.   Gastrointestinal: Negative for abdominal distention, abdominal pain, blood in stool, diarrhea, nausea and vomiting.   Genitourinary: Negative for dysuria, frequency, hematuria and urgency.   Neurological: Positive for tremors. Negative for speech difficulty, weakness, light-headedness and headaches.     Objective:     Vital Signs (Most Recent):  Temp: 98.6 °F (37 °C) (03/02/18 1154)  Pulse: 95 (03/02/18 1440)  Resp: 18 (03/02/18 1154)  BP: 130/62 (03/02/18 1154)  SpO2: 97 % (03/02/18 1154) Vital Signs (24h Range):  Temp:  [96.9 °F (36.1 °C)-98.6 °F (37 °C)] 98.6 °F (37 °C)  Pulse:  [] 95  Resp:  [16-20] 18  SpO2:  [92 %-97 %] 97 %  BP: (123-146)/(58-77) 130/62     Weight: 76 kg (167 lb 8.8 oz)  Body mass index is 24.74 kg/m².    Estimated Creatinine Clearance: 59.7 mL/min (based on SCr of 1.2 mg/dL).    Physical Exam   Constitutional: He is oriented to person, place, and time. He appears well-developed and well-nourished.   HENT:   Head: Normocephalic and atraumatic.   Right Ear: External ear normal.   Left Ear: External ear normal.   No oral ulceration    Eyes: EOM are normal.   Neck: Neck supple. No JVD present.   Cardiovascular: Normal rate, regular rhythm and intact distal pulses.    Murmur (systolic mumur in 4th left intercostal space) heard.  Pulmonary/Chest: He has no wheezes. He has rales.   Mild rales at base of lungs    Abdominal: Soft. Bowel sounds are normal. He exhibits no distension. There is no tenderness.   Musculoskeletal: He exhibits no edema.   Neurological: He is alert and oriented to person, place, and time.    Skin: Skin is warm and dry. Capillary refill takes less than 2 seconds.   No obvious skin breaks     Psychiatric: He has a normal mood and affect. His behavior is normal.       Significant Labs: All pertinent labs within the past 24 hours have been reviewed.    Significant Imaging: I have reviewed all pertinent imaging results/findings within the past 24 hours.

## 2018-03-02 NOTE — PHYSICIAN QUERY
PT Name: Rodolfo Messina  MR #: [id number]    Physician Query Form - Respiratory Condition Clarification      CDS/: Damaris Hess RN             Contact information: [email or web address]    This form is a permanent document in the medical record.    Query Date: March 2, 2018    By submitting this query, we are merely seeking further clarification of documentation. Please utilize your independent clinical judgment when addressing the question(s) below.    The Medical record contains the following   Indicators   Supporting Clinical Findings Location in Medical Record   X   SOB, HAQ, Wheezing, Productive Cough, Use of Accessory Muscles, etc. 67 year old male with HTN and essential tremor who initially presented with non productive cough with hypoxemia. Critical Care consulted for worsening hypoxemic respiratory failure. On reevaluation  he continues to be to hypoxic.  H & P 2/25 critical care   X   Acute/Chronic Illness Acute hypoxemic respiratory failure, concern for HCAP vs pulmonary edema, Pulmonary nodules, HTN, ANNA, Anemia H & P 2/25 critical care   X   Radiology Findings No acute cardiopulmonary abnormality    The cardiomediastinal silhouette is stable. There is os duration of the costophrenic angles, may reflect trace effusions, atelectasis, or scarring. The trachea is midline. The lungs are symmetrically expanded bilaterally patchy increased   interstitial and parietal attenuation bilaterally, may reflect edema or infection, correlation advised there is bandlike atelectasis projected over the right lower lung zone. There is no pneumothorax. The osseous structures are remarkable for   degenerative change.    CXR 2/21      CXR 2/24   X   Respiratory Distress or Failure Acute hypoxemic respiratory failure  Critical care consult 2/25   X   Hypoxia or Hypercapnia Critical care consulted for  Hypoxemic respiratory failure H & P critical care 2/25   X   RR         ABGs         O2 sat STAT ABG obtained and  will review CXR results.  ABG = 7.42, PCO2 28.4, PO2 55, HCO3 18.4, SpO2 89%. Significant event 2/25       BiPAP/Intubation     X   Supplemental O2 ABENA Stoner, called with results, will increase oxygen device to comfort flow, titrate to goal SpO2 > 89%, labs and lasix to be ordered.   RN progress notes ICU 2/25      Home O2, Oxygen Dependence     X   Treatment Responded to bedside for evaluation of increased O2 requirements. CXR, ABG reviewed. Xopenex ordered and patient responded well to treatment, sating in 90s. Subsequently placed on high flow NC with saturations maintaining. Later called back to bedside for worsening O2 saturations. CXR resulted at this time, now with worse edema despite heavy urine output throughout the day. Patient give lasix 40 mg x 1, started on comfort flow, antibiotics broadened per critical care recommendations. Saturations again stabilized. Repeat ABG at 0300 not significantly changed, and patient more comfortable. Progress notes 2/24, Hospital medicine      Other       Provider, please specify diagnosis or diagnoses associated with above clinical findings.    [x  ] Acute and (on) Chronic Respiratory Failure with Hypoxia  [  ] Other Acute and (on) Chronic Respiratory Failure  [  ] Other Respiratory Diagnosis (please specify): _______________________________  [  ] Clinically Undetermined    Please document in your progress notes daily for the duration of treatment until resolved and include in your discharge summary.

## 2018-03-03 LAB
ALBUMIN SERPL BCP-MCNC: 2.2 G/DL
ALBUMIN SERPL BCP-MCNC: 2.3 G/DL
ALBUMIN SERPL BCP-MCNC: 2.4 G/DL
ALP SERPL-CCNC: 64 U/L
ALT SERPL W/O P-5'-P-CCNC: 65 U/L
ANION GAP SERPL CALC-SCNC: 10 MMOL/L
ANION GAP SERPL CALC-SCNC: 9 MMOL/L
AST SERPL-CCNC: 60 U/L
BACTERIA BLD CULT: NORMAL
BASOPHILS # BLD AUTO: 0.03 K/UL
BASOPHILS NFR BLD: 0.4 %
BILIRUB DIRECT SERPL-MCNC: 0.5 MG/DL
BILIRUB SERPL-MCNC: 0.8 MG/DL
BUN SERPL-MCNC: 40 MG/DL
BUN SERPL-MCNC: 45 MG/DL
CALCIUM SERPL-MCNC: 8.6 MG/DL
CALCIUM SERPL-MCNC: 8.9 MG/DL
CHLORIDE SERPL-SCNC: 104 MMOL/L
CHLORIDE SERPL-SCNC: 106 MMOL/L
CO2 SERPL-SCNC: 25 MMOL/L
CO2 SERPL-SCNC: 28 MMOL/L
CREAT SERPL-MCNC: 1.3 MG/DL
CREAT SERPL-MCNC: 1.4 MG/DL
DIFFERENTIAL METHOD: ABNORMAL
EOSINOPHIL # BLD AUTO: 0.8 K/UL
EOSINOPHIL NFR BLD: 10.6 %
ERYTHROCYTE [DISTWIDTH] IN BLOOD BY AUTOMATED COUNT: 19 %
EST. GFR  (AFRICAN AMERICAN): 59.7 ML/MIN/1.73 M^2
EST. GFR  (AFRICAN AMERICAN): >60 ML/MIN/1.73 M^2
EST. GFR  (NON AFRICAN AMERICAN): 51.6 ML/MIN/1.73 M^2
EST. GFR  (NON AFRICAN AMERICAN): 56.5 ML/MIN/1.73 M^2
GLUCOSE SERPL-MCNC: 100 MG/DL
GLUCOSE SERPL-MCNC: 129 MG/DL
HCT VFR BLD AUTO: 20.4 %
HGB BLD-MCNC: 6.5 G/DL
IMM GRANULOCYTES # BLD AUTO: 0.04 K/UL
IMM GRANULOCYTES NFR BLD AUTO: 0.5 %
LYMPHOCYTES # BLD AUTO: 1.1 K/UL
LYMPHOCYTES NFR BLD: 14.2 %
MAGNESIUM SERPL-MCNC: 1.9 MG/DL
MCH RBC QN AUTO: 27.1 PG
MCHC RBC AUTO-ENTMCNC: 31.9 G/DL
MCV RBC AUTO: 85 FL
MONOCYTES # BLD AUTO: 0.5 K/UL
MONOCYTES NFR BLD: 6.3 %
NEUTROPHILS # BLD AUTO: 5.1 K/UL
NEUTROPHILS NFR BLD: 68 %
NRBC BLD-RTO: 0 /100 WBC
PHOSPHATE SERPL-MCNC: 4 MG/DL
PHOSPHATE SERPL-MCNC: 4.2 MG/DL
PLATELET # BLD AUTO: 163 K/UL
PMV BLD AUTO: 10.8 FL
POTASSIUM SERPL-SCNC: 3.8 MMOL/L
POTASSIUM SERPL-SCNC: 4.4 MMOL/L
PROT SERPL-MCNC: 5.7 G/DL
RBC # BLD AUTO: 2.4 M/UL
SODIUM SERPL-SCNC: 141 MMOL/L
SODIUM SERPL-SCNC: 141 MMOL/L
WBC # BLD AUTO: 7.44 K/UL

## 2018-03-03 PROCEDURE — 36415 COLL VENOUS BLD VENIPUNCTURE: CPT

## 2018-03-03 PROCEDURE — 25000003 PHARM REV CODE 250: Performed by: PHYSICIAN ASSISTANT

## 2018-03-03 PROCEDURE — 85025 COMPLETE CBC W/AUTO DIFF WBC: CPT

## 2018-03-03 PROCEDURE — 80076 HEPATIC FUNCTION PANEL: CPT

## 2018-03-03 PROCEDURE — 25000003 PHARM REV CODE 250: Performed by: NURSE PRACTITIONER

## 2018-03-03 PROCEDURE — 25000003 PHARM REV CODE 250: Performed by: HOSPITALIST

## 2018-03-03 PROCEDURE — 27000221 HC OXYGEN, UP TO 24 HOURS

## 2018-03-03 PROCEDURE — 80069 RENAL FUNCTION PANEL: CPT | Mod: 91

## 2018-03-03 PROCEDURE — 11000001 HC ACUTE MED/SURG PRIVATE ROOM

## 2018-03-03 PROCEDURE — 99232 SBSQ HOSP IP/OBS MODERATE 35: CPT | Mod: ,,, | Performed by: INTERNAL MEDICINE

## 2018-03-03 PROCEDURE — 25000003 PHARM REV CODE 250: Performed by: INTERNAL MEDICINE

## 2018-03-03 PROCEDURE — 63600175 PHARM REV CODE 636 W HCPCS: Performed by: INTERNAL MEDICINE

## 2018-03-03 PROCEDURE — 83735 ASSAY OF MAGNESIUM: CPT

## 2018-03-03 RX ORDER — RAMELTEON 8 MG/1
8 TABLET ORAL NIGHTLY PRN
Status: DISCONTINUED | OUTPATIENT
Start: 2018-03-03 | End: 2018-03-07 | Stop reason: HOSPADM

## 2018-03-03 RX ADMIN — GUAIFENESIN AND DEXTROMETHORPHAN 5 ML: 100; 10 SYRUP ORAL at 11:03

## 2018-03-03 RX ADMIN — AMLODIPINE BESYLATE 10 MG: 10 TABLET ORAL at 09:03

## 2018-03-03 RX ADMIN — FUROSEMIDE 40 MG: 10 INJECTION, SOLUTION INTRAMUSCULAR; INTRAVENOUS at 05:03

## 2018-03-03 RX ADMIN — BENZONATATE 100 MG: 100 CAPSULE ORAL at 12:03

## 2018-03-03 RX ADMIN — Medication 1 SPRAY: at 09:03

## 2018-03-03 RX ADMIN — FLUTICASONE PROPIONATE 50 MCG: 50 SPRAY, METERED NASAL at 09:03

## 2018-03-03 RX ADMIN — GUAIFENESIN AND DEXTROMETHORPHAN 5 ML: 100; 10 SYRUP ORAL at 08:03

## 2018-03-03 RX ADMIN — FUROSEMIDE 40 MG: 10 INJECTION, SOLUTION INTRAMUSCULAR; INTRAVENOUS at 09:03

## 2018-03-03 RX ADMIN — METHOCARBAMOL 500 MG: 500 TABLET ORAL at 08:03

## 2018-03-03 RX ADMIN — RAMELTEON 8 MG: 8 TABLET, FILM COATED ORAL at 08:03

## 2018-03-03 RX ADMIN — METHOCARBAMOL 500 MG: 500 TABLET ORAL at 12:03

## 2018-03-03 RX ADMIN — MIRTAZAPINE 30 MG: 15 TABLET, FILM COATED ORAL at 08:03

## 2018-03-03 RX ADMIN — BENZONATATE 100 MG: 100 CAPSULE ORAL at 08:03

## 2018-03-03 RX ADMIN — BENZONATATE 100 MG: 100 CAPSULE ORAL at 03:03

## 2018-03-03 RX ADMIN — AMPICILLIN SODIUM AND SULBACTAM SODIUM 3 G: 2; 1 INJECTION, POWDER, FOR SOLUTION INTRAMUSCULAR; INTRAVENOUS at 12:03

## 2018-03-03 RX ADMIN — TAMSULOSIN HYDROCHLORIDE 0.4 MG: 0.4 CAPSULE ORAL at 08:03

## 2018-03-03 RX ADMIN — AMPICILLIN SODIUM AND SULBACTAM SODIUM 3 G: 2; 1 INJECTION, POWDER, FOR SOLUTION INTRAMUSCULAR; INTRAVENOUS at 03:03

## 2018-03-03 RX ADMIN — AMPICILLIN SODIUM AND SULBACTAM SODIUM 3 G: 2; 1 INJECTION, POWDER, FOR SOLUTION INTRAMUSCULAR; INTRAVENOUS at 08:03

## 2018-03-03 NOTE — PROGRESS NOTES
Pt has urinated multiple time with clots and hematuria since lackey removal. The first void was clear yellow per pt statement and the past 5 have been dark red with clots, MD notified urinalysis collected.

## 2018-03-03 NOTE — PROGRESS NOTES
Progress Note   Hospital Medicine       Team: [facility] MED B John Agudelo MD  Admit Date: 2/21/2018  Length of Stay:  LOS: 10 days   ELIEL 3/5/2018  Principal Problem:  Acute hypoxemic respiratory failure    Interval hx / overnight events: Some hematuria after lackey was pulled.    Areas of concern/ handoff:    Review of Systems   Constitutional: Negative.    HENT: Negative.    Cardiovascular: Negative.    Gastrointestinal: Negative.    Genitourinary: Negative.    Musculoskeletal: Negative.    Skin: Negative.    Endo/Heme/Allergies: Negative.        Temp:  [98.1 °F (36.7 °C)-98.8 °F (37.1 °C)]   Pulse:  []   Resp:  [16-18]   BP: (119-143)/(56-67)   SpO2:  [93 %-97 %]       Physical Exam   Constitutional: He is oriented to person, place, and time and well-developed, well-nourished, and in no distress.   HENT:   Head: Normocephalic and atraumatic.   Eyes: Conjunctivae are normal. Pupils are equal, round, and reactive to light.   Cardiovascular: Normal rate and regular rhythm.    Pulmonary/Chest: Effort normal and breath sounds normal.   Abdominal: Soft. Bowel sounds are normal.   Musculoskeletal: Normal range of motion.   Neurological: He is alert and oriented to person, place, and time.   Skin: Skin is warm.       Recent Labs  Lab 02/26/18  0315 02/27/18  0409 02/28/18  0331 03/01/18  0503 03/02/18  0400 03/03/18  0523   WBC 10.32 7.63 8.36 8.02 7.79 7.44   HGB 8.3* 7.4* 7.6* 7.0* 7.1* 6.5*   HCT 25.1* 22.8* 23.6* 22.0* 22.4* 20.4*    177 180 155 179 163       Recent Labs  Lab 03/01/18  0503  03/02/18  0400 03/02/18  0750 03/02/18 2017 03/03/18  0523 03/03/18  0756   NA  --   < >  --  143 141  --  141   K  --   < >  --  4.1 4.2  --  3.8   CL  --   < >  --  108 105  --  106   CO2  --   < >  --  28 25  --  25   BUN  --   < >  --  37* 45*  --  40*   CREATININE  --   < >  --  1.2 1.4  --  1.3   GLU  --   < >  --  96 122*  --  100   CALCIUM  --   < >  --  8.8 8.9  --  8.6*   MG 1.9  --  2.0  --   --  1.9  --     PHOS  --   < >  --  4.2 3.9  --  4.2   < > = values in this interval not displayed.    Recent Labs  Lab 03/01/18  0503  03/02/18  0400  03/02/18 2017 03/03/18  0523 03/03/18  0756   ALKPHOS 68  --  66  --   --  64  --    ALT 39  --  71*  --   --  65*  --    AST 51*  --  94*  --   --  60*  --    ALBUMIN 2.4*  < > 2.5*  < > 2.4* 2.4* 2.2*   PROT 6.0  --  6.2  --   --  5.7*  --    BILITOT 1.3*  --  1.1*  --   --  0.8  --    < > = values in this interval not displayed.  No results for input(s): POCTGLUCOSE in the last 168 hours.        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]  Yes    Mitral and aortic valve regurgitation [I08.0]  Yes    Pulmonary hypertension due to mitral valve disease [I27.22, I05.9]  Yes    Subacute bacterial endocarditis [I33.0]  Yes    Acute cardiogenic pulmonary edema [I50.1]  Yes    Endocarditis [I38]  Yes    Subacute infective endocarditis [I33.0]  No    Symptomatic anemia [D64.9]  Yes    ANNA (acute kidney injury) [N17.9]  Yes    Chronic cough [R05]  Yes    Pulmonary nodules [R91.8]  Yes    Anemia [D64.9]  Yes    Anxiety [F41.9]  Yes    HTN (hypertension) [I10]  Yes     Chronic      Resolved Hospital Problems    Diagnosis Date Resolved POA    Asymptomatic bacteriuria [R82.71] 02/27/2018 Yes    Shortness of breath [R06.02] 02/27/2018 Yes    Mild malnutrition [E44.1] 02/27/2018 Yes       Acute hypoxemic respiratory failure     --2/2 cardiogenic pulmonary edema given endocarditis with moderate/severe  MR and AI in the setting of having received >3 L crystalloids, 2 units PRBC's during first days of admission.  --continuing to diurese and oxygen requirements slowly improving; continue to wean HFNC as tolerated for goal saturations of 90%  --Continue empiric coverage with Vancomycin, Unasyn, and Azithromycin for now (started 2/25) but can likely de-escalate azithro. Sputum culture from 2/25 not performed as inadequate specimen. Not currently with productive cough so hold on recollect  --concern for possible DAH previously given concurrent anemia with acute worsening hypoxemia/bilateral infiltrates. However, now with scant amount of hemoptysis. MAGNO negative. Awaiting ANCA, glomerular basement membrane antibody             Pulmonary nodules     --unclear etiology; inflammation, infection, vs other  --non-smoker, h/o occupational asbestos exposure  --repeat CT in 3-6 months                   r   HTN (hypertension)     --continue home amlodipine          Acute cardiogenic pulmonary edema     --2/2 MR and AI. See above          Subacute infective endocarditis     --appreciate cardiology's and CTS assistance. See above for plan           Aortic valve endocarditis     --noted on SOWMYA 2/27. Along with mitral valve endocarditis and associated AI, MR.  --one blood culture from 2/25 with GPC's awaiting speciation. Appreciate ID assistance. Will continue Vanco, Unasyn for now until speciation/sensitivity. Repeat cultures from 2/27 NGTD. Afebrile, without leukocytosis  --CTS consulted and recommending finishing Abx therapy, optimize volume status and follow up with CTS when stable.   --unclear initial source of infection           Mitral and aortic valve regurgitation     --2/2 endocarditis. See below  --optimize volume status as tolerated                  ANNA (acute kidney injury)     --previously attributed to bactrim (started for proteus UTI), now d/c'd; baseline creatinine ~ 1.1  --Renal US with evidence of medical renal  disease  --creatinine slowly improving with diuresis. Continue to trend                  Anemia     --no evidence of ongoing GIB, previous EGD and C scope without source of bleeding. Minimal amounts of hemoptysis currently.   --hemolytic labs negative. Inappropriately low reticulocytes  --s/p 2 units this admission (on 2/22 and 2/23). Continue to monitor and transfuse for Hgb <7            Time spent in care of the patient (Greater than 1/2 spent in direct face-to-face contact) 30 minutes     John Agudelo MD

## 2018-03-03 NOTE — SUBJECTIVE & OBJECTIVE
Patient feels better - no acute issues   Review of Systems   Constitutional: Negative for chills and fever.   HENT: Negative for nosebleeds, sore throat and trouble swallowing.    Respiratory: Positive for cough and shortness of breath. Negative for chest tightness and wheezing.    Cardiovascular: Negative for chest pain and palpitations.   Gastrointestinal: Negative for abdominal distention, abdominal pain, blood in stool, diarrhea, nausea and vomiting.   Genitourinary: Negative for dysuria, frequency, hematuria and urgency.   Neurological: Positive for tremors. Negative for speech difficulty, weakness, light-headedness and headaches. Interval History: see above     Review of Systems  Objective:     Vital Signs (Most Recent):  Temp: 96.8 °F (36 °C) (03/03/18 1649)  Pulse: 93 (03/03/18 1649)  Resp: 18 (03/03/18 1649)  BP: 138/63 (03/03/18 1649)  SpO2: 96 % (03/03/18 1649) Vital Signs (24h Range):  Temp:  [96.8 °F (36 °C)-98.9 °F (37.2 °C)] 96.8 °F (36 °C)  Pulse:  [] 93  Resp:  [16-18] 18  SpO2:  [93 %-99 %] 96 %  BP: (119-143)/(58-67) 138/63     Weight: 76 kg (167 lb 8.8 oz)  Body mass index is 24.74 kg/m².    Estimated Creatinine Clearance: 55.1 mL/min (based on SCr of 1.3 mg/dL).    Physical Exam   Constitutional: He is oriented to person, place, and time. He appears well-developed and well-nourished.   HENT:   Head: Normocephalic.   Eyes: Pupils are equal, round, and reactive to light.   Neck: Neck supple.   Cardiovascular: Normal rate and regular rhythm.    Murmur heard.  Pulmonary/Chest: Effort normal and breath sounds normal.   Abdominal: Soft.   Thin    Musculoskeletal: Normal range of motion.   Neurological: He is alert and oriented to person, place, and time.   Skin:   IV sites OK        Significant Labs: All pertinent labs within the past 24 hours have been reviewed.    Significant Imaging: I have reviewed all pertinent imaging results/findings within the past 24 hours.

## 2018-03-03 NOTE — ASSESSMENT & PLAN NOTE
68 yo male with HTN who presented with symptomatic anemia. Had a respiratory decompensation on floor and transferred to ICU. SOWMYA showed aortic and mitral valve vegetations suggestive of bacterial endocarditis.     - Blood culture from 2/25 growing Enterococcus Faecalis, sensitive to ampicillin Positive Bcx on 2/27. BCx 3/1 NGTD    BC still NGTD   Plan to treat with amp (or Unasyn) and ceftriaxone likely for 6 weeks - need to be sure the blood stream is cleared

## 2018-03-03 NOTE — PROGRESS NOTES
[facility]  Infectious Disease  Progress Note    Patient Name: Rodolfo Messina  MRN: [id number]  Admission Date: 2/21/2018  Length of Stay: 10 days  Attending Physician: John Agudelo MD  Primary Care Provider: Deric Claudio MD    Isolation Status: No active isolations  Assessment/Plan:      Subacute infective endocarditis    68 yo male with HTN who presented with symptomatic anemia. Had a respiratory decompensation on floor and transferred to ICU. SOWMYA showed aortic and mitral valve vegetations suggestive of bacterial endocarditis.     - Blood culture from 2/25 growing Enterococcus Faecalis, sensitive to ampicillin Positive Bcx on 2/27. BCx 3/1 NGTD    BC still NGTD   Plan to treat with amp (or Unasyn) and ceftriaxone likely for 6 weeks - need to be sure the blood stream is cleared            Anticipated Disposition:     Thank you for your consult. I will follow-up with patient. Please contact us if you have any additional questions.    Deandre Palm MD  Infectious Disease  [facility]    Subjective:     Principal Problem:Acute hypoxemic respiratory failure    HPI:  Mr. Messina is a 67 year old man with HTN, urinary retention (indwelling lackey), chronic back pain, essential tremor, anxiety and depression who was initially admitted with recurrent anemia (Hgb 6.7 on admit). Evaluated by GI who did not think patient was having an upper or lower GI bleed. He was also evaluated by hem-onc who recommended any outpatient bone marrow biopsy. He received 2 units of pRBC while admitted. Patient states that he has had a chronic cough for the 2 months and occasional fevers for which he received steroids and antibiotics. CTA chest on admit shows multiple nodules and micronodular clusters throughout the right lung, consistent with small airway infectious versus noninfectious inflammation but not excluding malignancy (patient has history of occupational asbestos exposure and  smoking). Quantiferon gold was indeterminate. He was evaluated by pulmonary who recommend duo-nebs and outpatient follow-up.  He was also seen by ID for recent UTI who recommend augmentin x 7 days. On 2/24, patient developed acute respiratory failure required up to 30 L O2 and was transferred to ICU. CXR showed  interval development of bilateral patchy interstitial and parietal attenuation reflective of edema vs infection. He was started on vanc, unasyn, and azithromycin. 2D echo revealed evidence of aortic valve vegetation. SOWMYA performed on 2/27 showed vegetation on aortic and mitral valve associated with severe aortic and mitral regurg. Blood culture from 2/26 grew gram pos cocci in 1 of 4 bottles.     Patient denies any recent dental work. He did receive blood transfusions within the last month related to anemia. No recent travel. Patient does have cats that have scratched him in the past.  Patient feels better - no acute issues   Review of Systems   Constitutional: Negative for chills and fever.   HENT: Negative for nosebleeds, sore throat and trouble swallowing.    Respiratory: Positive for cough and shortness of breath. Negative for chest tightness and wheezing.    Cardiovascular: Negative for chest pain and palpitations.   Gastrointestinal: Negative for abdominal distention, abdominal pain, blood in stool, diarrhea, nausea and vomiting.   Genitourinary: Negative for dysuria, frequency, hematuria and urgency.   Neurological: Positive for tremors. Negative for speech difficulty, weakness, light-headedness and headaches. Interval History: see above     Review of Systems  Objective:     Vital Signs (Most Recent):  Temp: 96.8 °F (36 °C) (03/03/18 1649)  Pulse: 93 (03/03/18 1649)  Resp: 18 (03/03/18 1649)  BP: 138/63 (03/03/18 1649)  SpO2: 96 % (03/03/18 1649) Vital Signs (24h Range):  Temp:  [96.8 °F (36 °C)-98.9 °F (37.2 °C)] 96.8 °F (36 °C)  Pulse:  [] 93  Resp:  [16-18] 18  SpO2:  [93 %-99 %] 96 %  BP:  (119-143)/(58-67) 138/63     Weight: 76 kg (167 lb 8.8 oz)  Body mass index is 24.74 kg/m².    Estimated Creatinine Clearance: 55.1 mL/min (based on SCr of 1.3 mg/dL).    Physical Exam   Constitutional: He is oriented to person, place, and time. He appears well-developed and well-nourished.   HENT:   Head: Normocephalic.   Eyes: Pupils are equal, round, and reactive to light.   Neck: Neck supple.   Cardiovascular: Normal rate and regular rhythm.    Murmur heard.  Pulmonary/Chest: Effort normal and breath sounds normal.   Abdominal: Soft.   Thin    Musculoskeletal: Normal range of motion.   Neurological: He is alert and oriented to person, place, and time.   Skin:   IV sites OK        Significant Labs: All pertinent labs within the past 24 hours have been reviewed.    Significant Imaging: I have reviewed all pertinent imaging results/findings within the past 24 hours.

## 2018-03-04 LAB
ALBUMIN SERPL BCP-MCNC: 2.2 G/DL
ALBUMIN SERPL BCP-MCNC: 2.6 G/DL
ALBUMIN SERPL BCP-MCNC: 2.7 G/DL
ALP SERPL-CCNC: 68 U/L
ALT SERPL W/O P-5'-P-CCNC: 55 U/L
ANION GAP SERPL CALC-SCNC: 11 MMOL/L
ANION GAP SERPL CALC-SCNC: 9 MMOL/L
AST SERPL-CCNC: 43 U/L
BACTERIA UR CULT: NO GROWTH
BASOPHILS # BLD AUTO: 0.03 K/UL
BASOPHILS NFR BLD: 0.4 %
BILIRUB DIRECT SERPL-MCNC: 0.3 MG/DL
BILIRUB SERPL-MCNC: 0.6 MG/DL
BUN SERPL-MCNC: 41 MG/DL
BUN SERPL-MCNC: 45 MG/DL
CALCIUM SERPL-MCNC: 9 MG/DL
CALCIUM SERPL-MCNC: 9.2 MG/DL
CHLORIDE SERPL-SCNC: 106 MMOL/L
CHLORIDE SERPL-SCNC: 106 MMOL/L
CO2 SERPL-SCNC: 24 MMOL/L
CO2 SERPL-SCNC: 26 MMOL/L
CREAT SERPL-MCNC: 1.3 MG/DL
CREAT SERPL-MCNC: 1.6 MG/DL
DIFFERENTIAL METHOD: ABNORMAL
EOSINOPHIL # BLD AUTO: 0.6 K/UL
EOSINOPHIL NFR BLD: 7.3 %
ERYTHROCYTE [DISTWIDTH] IN BLOOD BY AUTOMATED COUNT: 19.3 %
EST. GFR  (AFRICAN AMERICAN): 50.8 ML/MIN/1.73 M^2
EST. GFR  (AFRICAN AMERICAN): >60 ML/MIN/1.73 M^2
EST. GFR  (NON AFRICAN AMERICAN): 43.9 ML/MIN/1.73 M^2
EST. GFR  (NON AFRICAN AMERICAN): 56.5 ML/MIN/1.73 M^2
GLUCOSE SERPL-MCNC: 104 MG/DL
GLUCOSE SERPL-MCNC: 99 MG/DL
HCT VFR BLD AUTO: 20.7 %
HGB BLD-MCNC: 6.6 G/DL
IMM GRANULOCYTES # BLD AUTO: 0.03 K/UL
IMM GRANULOCYTES NFR BLD AUTO: 0.4 %
LYMPHOCYTES # BLD AUTO: 1.2 K/UL
LYMPHOCYTES NFR BLD: 14.5 %
MAGNESIUM SERPL-MCNC: 2 MG/DL
MCH RBC QN AUTO: 27.3 PG
MCHC RBC AUTO-ENTMCNC: 31.9 G/DL
MCV RBC AUTO: 86 FL
MONOCYTES # BLD AUTO: 0.6 K/UL
MONOCYTES NFR BLD: 7.4 %
NEUTROPHILS # BLD AUTO: 5.7 K/UL
NEUTROPHILS NFR BLD: 70 %
NRBC BLD-RTO: 0 /100 WBC
PHOSPHATE SERPL-MCNC: 4.2 MG/DL
PHOSPHATE SERPL-MCNC: 4.4 MG/DL
PLATELET # BLD AUTO: 179 K/UL
PMV BLD AUTO: 10.9 FL
POTASSIUM SERPL-SCNC: 3.9 MMOL/L
POTASSIUM SERPL-SCNC: 3.9 MMOL/L
PROT SERPL-MCNC: 5.9 G/DL
RBC # BLD AUTO: 2.42 M/UL
SODIUM SERPL-SCNC: 141 MMOL/L
SODIUM SERPL-SCNC: 141 MMOL/L
WBC # BLD AUTO: 8.12 K/UL

## 2018-03-04 PROCEDURE — 63600175 PHARM REV CODE 636 W HCPCS: Performed by: INTERNAL MEDICINE

## 2018-03-04 PROCEDURE — 25000003 PHARM REV CODE 250: Performed by: NURSE PRACTITIONER

## 2018-03-04 PROCEDURE — 83735 ASSAY OF MAGNESIUM: CPT

## 2018-03-04 PROCEDURE — 80076 HEPATIC FUNCTION PANEL: CPT

## 2018-03-04 PROCEDURE — 99232 SBSQ HOSP IP/OBS MODERATE 35: CPT | Mod: ,,, | Performed by: INTERNAL MEDICINE

## 2018-03-04 PROCEDURE — 80069 RENAL FUNCTION PANEL: CPT | Mod: 91

## 2018-03-04 PROCEDURE — 85025 COMPLETE CBC W/AUTO DIFF WBC: CPT

## 2018-03-04 PROCEDURE — 25000003 PHARM REV CODE 250: Performed by: INTERNAL MEDICINE

## 2018-03-04 PROCEDURE — 36415 COLL VENOUS BLD VENIPUNCTURE: CPT

## 2018-03-04 PROCEDURE — 25000003 PHARM REV CODE 250: Performed by: PHYSICIAN ASSISTANT

## 2018-03-04 PROCEDURE — 11000001 HC ACUTE MED/SURG PRIVATE ROOM

## 2018-03-04 PROCEDURE — 25000003 PHARM REV CODE 250: Performed by: HOSPITALIST

## 2018-03-04 RX ADMIN — TAMSULOSIN HYDROCHLORIDE 0.4 MG: 0.4 CAPSULE ORAL at 08:03

## 2018-03-04 RX ADMIN — AMLODIPINE BESYLATE 10 MG: 10 TABLET ORAL at 09:03

## 2018-03-04 RX ADMIN — MIRTAZAPINE 30 MG: 15 TABLET, FILM COATED ORAL at 08:03

## 2018-03-04 RX ADMIN — BENZONATATE 100 MG: 100 CAPSULE ORAL at 08:03

## 2018-03-04 RX ADMIN — GUAIFENESIN AND DEXTROMETHORPHAN 5 ML: 100; 10 SYRUP ORAL at 04:03

## 2018-03-04 RX ADMIN — BENZONATATE 100 MG: 100 CAPSULE ORAL at 01:03

## 2018-03-04 RX ADMIN — FUROSEMIDE 40 MG: 10 INJECTION, SOLUTION INTRAMUSCULAR; INTRAVENOUS at 09:03

## 2018-03-04 RX ADMIN — AMPICILLIN SODIUM AND SULBACTAM SODIUM 3 G: 2; 1 INJECTION, POWDER, FOR SOLUTION INTRAMUSCULAR; INTRAVENOUS at 03:03

## 2018-03-04 RX ADMIN — AMPICILLIN SODIUM AND SULBACTAM SODIUM 3 G: 2; 1 INJECTION, POWDER, FOR SOLUTION INTRAMUSCULAR; INTRAVENOUS at 09:03

## 2018-03-04 RX ADMIN — Medication 1 SPRAY: at 01:03

## 2018-03-04 RX ADMIN — FLUTICASONE PROPIONATE 50 MCG: 50 SPRAY, METERED NASAL at 09:03

## 2018-03-04 RX ADMIN — RAMELTEON 8 MG: 8 TABLET, FILM COATED ORAL at 08:03

## 2018-03-04 RX ADMIN — METHOCARBAMOL 500 MG: 500 TABLET ORAL at 08:03

## 2018-03-04 RX ADMIN — Medication 1 SPRAY: at 09:03

## 2018-03-04 RX ADMIN — FUROSEMIDE 40 MG: 10 INJECTION, SOLUTION INTRAMUSCULAR; INTRAVENOUS at 05:03

## 2018-03-04 RX ADMIN — OXYCODONE HYDROCHLORIDE 5 MG: 5 TABLET ORAL at 08:03

## 2018-03-04 NOTE — NURSING
Paged Med B related to pt continues to complain of cough after receiving Tessalon pearls, and Cough syrup. Pt stated that he has some tightness in his chest as well. Received return call back from Dr. Agudelo and he stated that he will put in an order for xray.  Explained above to pt and wife. Both verbalized understanding.

## 2018-03-04 NOTE — PLAN OF CARE
Patient stable - will need IV abx for 4-6 weeks -   Repeat Blood cultures are negative   Plan for placing PICC if not already and need to set up home IV abx in AM

## 2018-03-04 NOTE — PLAN OF CARE
Problem: Fall Risk (Adult)  Goal: Absence of Falls  Patient will demonstrate the desired outcomes by discharge/transition of care.   Outcome: Ongoing (interventions implemented as appropriate)   03/03/18 1812   Fall Risk (Adult)   Absence of Falls making progress toward outcome       Problem: Patient Care Overview  Goal: Plan of Care Review  Outcome: Ongoing (interventions implemented as appropriate)   03/03/18 1812   Coping/Psychosocial   Plan Of Care Reviewed With patient       Problem: Infection, Risk/Actual (Adult)  Goal: Identify Related Risk Factors and Signs and Symptoms  Related risk factors and signs and symptoms are identified upon initiation of Human Response Clinical Practice Guideline (CPG)   Outcome: Ongoing (interventions implemented as appropriate)   03/03/18 1812   Infection, Risk/Actual   Related Risk Factors (Infection, Risk/Actual) chronic illness/condition;exposure to microbes;blood disorder;prolonged hospitalization;sleep disturbance   Signs and Symptoms (Infection, Risk/Actual) body temperature changes;chills;feeding/eating intolerance;respiratory rate increase;mental/behavioral changes

## 2018-03-04 NOTE — PROGRESS NOTES
Progress Note   Hospital Medicine       Team: [facility] [place] [facility] B John Agudelo MD  Admit Date: 2/21/2018  Length of Stay:  LOS: 11 days   ELIEL 3/5/2018  Principal Problem:  Acute hypoxemic respiratory failure    Interval hx / overnight events:  Feeling well, good UOP since lackey removed, BC NGTD, possible dc in am with PICC    Areas of concern/ handoff:    ROS    Temp:  [96.2 °F (35.7 °C)-98.9 °F (37.2 °C)]   Pulse:  []   Resp:  [16-18]   BP: (121-138)/(56-64)   SpO2:  [92 %-99 %]       Physical Exam    Recent Labs  Lab 02/27/18  0409 02/28/18  0331 03/01/18  0503 03/02/18  0400 03/03/18  0523 03/04/18  0344   WBC 7.63 8.36 8.02 7.79 7.44 8.12   HGB 7.4* 7.6* 7.0* 7.1* 6.5* 6.6*   HCT 22.8* 23.6* 22.0* 22.4* 20.4* 20.7*    180 155 179 163 179       Recent Labs  Lab 03/02/18 0400 03/03/18 0523 03/03/18  0756 03/03/18 1930 03/04/18  0344 03/04/18  0757   NA  --   < >  --  141 141  --  141   K  --   < >  --  3.8 4.4  --  3.9   CL  --   < >  --  106 104  --  106   CO2  --   < >  --  25 28  --  26   BUN  --   < >  --  40* 45*  --  41*   CREATININE  --   < >  --  1.3 1.4  --  1.3   GLU  --   < >  --  100 129*  --  99   CALCIUM  --   < >  --  8.6* 8.9  --  9.2   MG 2.0  --  1.9  --   --  2.0  --    PHOS  --   < >  --  4.2 4.0  --  4.2   < > = values in this interval not displayed.    Recent Labs  Lab 03/02/18 0400 03/03/18 0523 03/03/18 1930 03/04/18  0344 03/04/18  0757   ALKPHOS 66  --  64  --   --  68  --    ALT 71*  --  65*  --   --  55*  --    AST 94*  --  60*  --   --  43*  --    ALBUMIN 2.5*  < > 2.4*  < > 2.3* 2.2* 2.7*   PROT 6.2  --  5.7*  --   --  5.9*  --    BILITOT 1.1*  --  0.8  --   --  0.6  --    < > = values in this interval not displayed.  No results for input(s): POCTGLUCOSE in the last 168 hours.        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]  Yes    Mitral and aortic valve regurgitation [I08.0]  Yes    Pulmonary hypertension due to mitral valve disease [I27.22, I05.9]  Yes    Subacute bacterial endocarditis [I33.0]  Yes    Acute cardiogenic pulmonary edema [I50.1]  Yes    Endocarditis [I38]  Yes    Subacute infective endocarditis [I33.0]  No    Symptomatic anemia [D64.9]  Yes    ANNA (acute kidney injury) [N17.9]  Yes    Chronic cough [R05]  Yes    Pulmonary nodules [R91.8]  Yes    Anemia [D64.9]  Yes    Anxiety [F41.9]  Yes    HTN (hypertension) [I10]  Yes     Chronic      Resolved Hospital Problems    Diagnosis Date Resolved POA    Asymptomatic bacteriuria [R82.71] 02/27/2018 Yes    Shortness of breath [R06.02] 02/27/2018 Yes    Mild malnutrition [E44.1] 02/27/2018 Yes       Acute hypoxemic respiratory failure     --2/2 cardiogenic pulmonary edema given endocarditis with moderate/severe MR and AI in the setting of having received >3 L crystalloids, 2 units PRBC's during first days of admission.  --continuing to diurese and oxygen requirements slowly improving; continue to wean HFNC as tolerated for goal saturations of 90%  --Continue empiric coverage with Vancomycin, Unasyn, and Azithromycin for now (started 2/25) but can likely de-escalate azithro. Sputum culture from 2/25 not performed as inadequate specimen. Not currently with productive cough so hold on recollect  --concern for possible DAH previously given concurrent anemia with acute worsening hypoxemia/bilateral infiltrates. However, now with scant amount of hemoptysis. MAGNO negative. Awaiting ANCA, glomerular basement membrane  antibody             Pulmonary nodules     --unclear etiology; inflammation, infection, vs other  --non-smoker, h/o occupational asbestos exposure  --repeat CT in 3-6 months                   r   HTN (hypertension)     --continue home amlodipine          Acute cardiogenic pulmonary edema     --2/2 MR and AI. See above          Subacute infective endocarditis     --appreciate cardiology's and CTS assistance. See above for plan           Aortic valve endocarditis     --noted on SOWMYA 2/27. Along with mitral valve endocarditis and associated AI, MR.  --one blood culture from 2/25 with GPC's awaiting speciation. Appreciate ID assistance. Will continue Vanco, Unasyn for now until speciation/sensitivity. Repeat cultures from 2/27 NGTD. Afebrile, without leukocytosis  --CTS consulted and recommending finishing Abx therapy, optimize volume status and follow up with CTS when stable.   --unclear initial source of infection           Mitral and aortic valve regurgitation     --2/2 endocarditis. See below  --optimize volume status as tolerated                  ANNA (acute kidney injury)     --previously attributed to bactrim (started for proteus UTI), now d/c'd; baseline creatinine ~ 1.1  --Renal US with evidence of medical renal disease  --creatinine slowly improving with diuresis. Continue to trend                  Anemia     --no evidence of ongoing GIB, previous EGD and C scope without source of bleeding. Minimal amounts of hemoptysis currently.   --hemolytic labs negative. Inappropriately low reticulocytes  --s/p 2 units this admission (on 2/22 and 2/23). Continue to monitor and transfuse for Hgb <7            Time spent in care of the patient (Greater than 1/2 spent in direct face-to-face contact) 30 minutes     MD John Gomes MD

## 2018-03-04 NOTE — PLAN OF CARE
Problem: Patient Care Overview  Goal: Plan of Care Review  Outcome: Ongoing (interventions implemented as appropriate)  Safety:  call light in reach, patient oriented to room & instructed how to notify nurse if assistance is needed, questions & concerns addressed, bed in lowest position with wheels locked & side rails up X 2, fall precautions followed, patient free from fall & injury thus far this shift;  VTE/bleeding precautions maintained.  Activity:  patient up with assistance, weight shifted at least every other hour.  Neurological:  patient A&O X 4, follows commands, equal  strength & dorsi/plantarflexion, neuro checks performed as ordered & WDL.  Respiratory:  patient tolerates room air without distress, but has NC @ 2 L/M to use when needed, denies SOB.  Cardiac:  Denies chest pain, BP stable.  HR stable.  NSR on telemetry.  Afebrile this shift.  GI:  Patient tolerates PO intake well, denies nausea, LBM 3/2/18.  :  patient voids tea/ camille colored urine spontaneously in adequate amounts. Denies dysuria.  Skin:  CDI.  Devices:  PIV CDI, negative for s/sx of infection & infiltration.  Pain:  patient denies pain. MAJOR COMPLAINT: lack of sleep. He was on Xanax nightly prn at home and he has been unable to sleep during this hospitalization. Wife remains at bedside. Will continue to monitor patient.

## 2018-03-05 PROBLEM — R33.9 URINARY RETENTION WITH INCOMPLETE BLADDER EMPTYING: Status: ACTIVE | Noted: 2018-03-05

## 2018-03-05 PROBLEM — R31.9 HEMATURIA, UNSPECIFIED: Status: ACTIVE | Noted: 2018-03-05

## 2018-03-05 LAB
ABO + RH BLD: NORMAL
ALBUMIN SERPL BCP-MCNC: 2.3 G/DL
ALBUMIN SERPL BCP-MCNC: 2.3 G/DL
ALBUMIN SERPL BCP-MCNC: 2.5 G/DL
ALP SERPL-CCNC: 62 U/L
ALP SERPL-CCNC: 64 U/L
ALT SERPL W/O P-5'-P-CCNC: 52 U/L
ALT SERPL W/O P-5'-P-CCNC: 53 U/L
ANION GAP SERPL CALC-SCNC: 10 MMOL/L
AST SERPL-CCNC: 42 U/L
AST SERPL-CCNC: 43 U/L
BASOPHILS # BLD AUTO: 0.01 K/UL
BASOPHILS NFR BLD: 0.1 %
BILIRUB DIRECT SERPL-MCNC: 0.2 MG/DL
BILIRUB DIRECT SERPL-MCNC: 0.5 MG/DL
BILIRUB SERPL-MCNC: 0.5 MG/DL
BILIRUB SERPL-MCNC: 1.1 MG/DL
BLD GP AB SCN CELLS X3 SERPL QL: NORMAL
BLD PROD TYP BPU: NORMAL
BLD PROD TYP BPU: NORMAL
BLOOD UNIT EXPIRATION DATE: NORMAL
BLOOD UNIT EXPIRATION DATE: NORMAL
BLOOD UNIT TYPE CODE: 6200
BLOOD UNIT TYPE CODE: 6200
BLOOD UNIT TYPE: NORMAL
BLOOD UNIT TYPE: NORMAL
BUN SERPL-MCNC: 39 MG/DL
CALCIUM SERPL-MCNC: 8.8 MG/DL
CHLORIDE SERPL-SCNC: 108 MMOL/L
CO2 SERPL-SCNC: 23 MMOL/L
CODING SYSTEM: NORMAL
CODING SYSTEM: NORMAL
CREAT SERPL-MCNC: 1.4 MG/DL
DIFFERENTIAL METHOD: ABNORMAL
DISPENSE STATUS: NORMAL
DISPENSE STATUS: NORMAL
EOSINOPHIL # BLD AUTO: 0.6 K/UL
EOSINOPHIL NFR BLD: 7.4 %
ERYTHROCYTE [DISTWIDTH] IN BLOOD BY AUTOMATED COUNT: 19.6 %
EST. GFR  (AFRICAN AMERICAN): 59.7 ML/MIN/1.73 M^2
EST. GFR  (NON AFRICAN AMERICAN): 51.6 ML/MIN/1.73 M^2
GLUCOSE SERPL-MCNC: 99 MG/DL
HCT VFR BLD AUTO: 19.9 %
HGB BLD-MCNC: 6.4 G/DL
IMM GRANULOCYTES # BLD AUTO: 0.04 K/UL
IMM GRANULOCYTES NFR BLD AUTO: 0.5 %
LYMPHOCYTES # BLD AUTO: 1.3 K/UL
LYMPHOCYTES NFR BLD: 16.8 %
MAGNESIUM SERPL-MCNC: 1.9 MG/DL
MCH RBC QN AUTO: 27.5 PG
MCHC RBC AUTO-ENTMCNC: 32.2 G/DL
MCV RBC AUTO: 85 FL
MONOCYTES # BLD AUTO: 0.6 K/UL
MONOCYTES NFR BLD: 7.6 %
NEUTROPHILS # BLD AUTO: 5.3 K/UL
NEUTROPHILS NFR BLD: 67.6 %
NRBC BLD-RTO: 0 /100 WBC
PHOSPHATE SERPL-MCNC: 4.3 MG/DL
PLATELET # BLD AUTO: 169 K/UL
PMV BLD AUTO: 10.7 FL
POTASSIUM SERPL-SCNC: 3.7 MMOL/L
PROT SERPL-MCNC: 5.8 G/DL
PROT SERPL-MCNC: 6 G/DL
RBC # BLD AUTO: 2.33 M/UL
SODIUM SERPL-SCNC: 141 MMOL/L
TRANS ERYTHROCYTES VOL PATIENT: NORMAL ML
TRANS ERYTHROCYTES VOL PATIENT: NORMAL ML
WBC # BLD AUTO: 7.8 K/UL

## 2018-03-05 PROCEDURE — 25000003 PHARM REV CODE 250: Performed by: NURSE PRACTITIONER

## 2018-03-05 PROCEDURE — 63600175 PHARM REV CODE 636 W HCPCS: Performed by: INTERNAL MEDICINE

## 2018-03-05 PROCEDURE — 25000003 PHARM REV CODE 250: Performed by: HOSPITALIST

## 2018-03-05 PROCEDURE — 02HV33Z INSERTION OF INFUSION DEVICE INTO SUPERIOR VENA CAVA, PERCUTANEOUS APPROACH: ICD-10-PCS | Performed by: INTERNAL MEDICINE

## 2018-03-05 PROCEDURE — P9021 RED BLOOD CELLS UNIT: HCPCS

## 2018-03-05 PROCEDURE — 76937 US GUIDE VASCULAR ACCESS: CPT

## 2018-03-05 PROCEDURE — 25000003 PHARM REV CODE 250: Performed by: INTERNAL MEDICINE

## 2018-03-05 PROCEDURE — 36415 COLL VENOUS BLD VENIPUNCTURE: CPT

## 2018-03-05 PROCEDURE — 80076 HEPATIC FUNCTION PANEL: CPT | Mod: 91

## 2018-03-05 PROCEDURE — 99232 SBSQ HOSP IP/OBS MODERATE 35: CPT | Mod: GC,,, | Performed by: INTERNAL MEDICINE

## 2018-03-05 PROCEDURE — 25000003 PHARM REV CODE 250: Performed by: PHYSICIAN ASSISTANT

## 2018-03-05 PROCEDURE — 86920 COMPATIBILITY TEST SPIN: CPT

## 2018-03-05 PROCEDURE — 83735 ASSAY OF MAGNESIUM: CPT

## 2018-03-05 PROCEDURE — 85025 COMPLETE CBC W/AUTO DIFF WBC: CPT

## 2018-03-05 PROCEDURE — A4216 STERILE WATER/SALINE, 10 ML: HCPCS | Performed by: INTERNAL MEDICINE

## 2018-03-05 PROCEDURE — 80076 HEPATIC FUNCTION PANEL: CPT

## 2018-03-05 PROCEDURE — 99233 SBSQ HOSP IP/OBS HIGH 50: CPT | Mod: ,,, | Performed by: INTERNAL MEDICINE

## 2018-03-05 PROCEDURE — 11000001 HC ACUTE MED/SURG PRIVATE ROOM

## 2018-03-05 PROCEDURE — 36569 INSJ PICC 5 YR+ W/O IMAGING: CPT

## 2018-03-05 PROCEDURE — 86850 RBC ANTIBODY SCREEN: CPT

## 2018-03-05 PROCEDURE — C1751 CATH, INF, PER/CENT/MIDLINE: HCPCS

## 2018-03-05 PROCEDURE — 80069 RENAL FUNCTION PANEL: CPT

## 2018-03-05 RX ORDER — TAMSULOSIN HYDROCHLORIDE 0.4 MG/1
0.8 CAPSULE ORAL 2 TIMES DAILY
Status: DISCONTINUED | OUTPATIENT
Start: 2018-03-06 | End: 2018-03-07 | Stop reason: HOSPADM

## 2018-03-05 RX ORDER — HYDROCODONE BITARTRATE AND ACETAMINOPHEN 500; 5 MG/1; MG/1
TABLET ORAL
Status: DISCONTINUED | OUTPATIENT
Start: 2018-03-05 | End: 2018-03-07 | Stop reason: HOSPADM

## 2018-03-05 RX ORDER — SODIUM CHLORIDE 0.9 % (FLUSH) 0.9 %
10 SYRINGE (ML) INJECTION EVERY 6 HOURS
Status: DISCONTINUED | OUTPATIENT
Start: 2018-03-05 | End: 2018-03-07 | Stop reason: HOSPADM

## 2018-03-05 RX ORDER — SODIUM CHLORIDE 0.9 % (FLUSH) 0.9 %
10 SYRINGE (ML) INJECTION
Status: DISCONTINUED | OUTPATIENT
Start: 2018-03-05 | End: 2018-03-07 | Stop reason: HOSPADM

## 2018-03-05 RX ADMIN — RAMELTEON 8 MG: 8 TABLET, FILM COATED ORAL at 10:03

## 2018-03-05 RX ADMIN — AMPICILLIN 2 G: 2 INJECTION, POWDER, FOR SOLUTION INTRAVENOUS at 12:03

## 2018-03-05 RX ADMIN — OXYCODONE HYDROCHLORIDE 5 MG: 5 TABLET ORAL at 12:03

## 2018-03-05 RX ADMIN — FUROSEMIDE 40 MG: 10 INJECTION, SOLUTION INTRAMUSCULAR; INTRAVENOUS at 09:03

## 2018-03-05 RX ADMIN — OXYCODONE HYDROCHLORIDE 5 MG: 5 TABLET ORAL at 08:03

## 2018-03-05 RX ADMIN — FLUTICASONE PROPIONATE 50 MCG: 50 SPRAY, METERED NASAL at 09:03

## 2018-03-05 RX ADMIN — FLUTICASONE PROPIONATE 50 MCG: 50 SPRAY, METERED NASAL at 08:03

## 2018-03-05 RX ADMIN — CEFTRIAXONE SODIUM 2 G: 2 INJECTION, POWDER, FOR SOLUTION INTRAMUSCULAR; INTRAVENOUS at 01:03

## 2018-03-05 RX ADMIN — AMPICILLIN 2 G: 2 INJECTION, POWDER, FOR SOLUTION INTRAVENOUS at 05:03

## 2018-03-05 RX ADMIN — MIRTAZAPINE 30 MG: 15 TABLET, FILM COATED ORAL at 08:03

## 2018-03-05 RX ADMIN — Medication 1 SPRAY: at 09:03

## 2018-03-05 RX ADMIN — BENZONATATE 100 MG: 100 CAPSULE ORAL at 08:03

## 2018-03-05 RX ADMIN — AMPICILLIN SODIUM AND SULBACTAM SODIUM 3 G: 2; 1 INJECTION, POWDER, FOR SOLUTION INTRAMUSCULAR; INTRAVENOUS at 03:03

## 2018-03-05 RX ADMIN — FUROSEMIDE 40 MG: 10 INJECTION, SOLUTION INTRAMUSCULAR; INTRAVENOUS at 05:03

## 2018-03-05 RX ADMIN — AMLODIPINE BESYLATE 10 MG: 10 TABLET ORAL at 09:03

## 2018-03-05 RX ADMIN — BENZONATATE 100 MG: 100 CAPSULE ORAL at 09:03

## 2018-03-05 RX ADMIN — SODIUM CHLORIDE, PRESERVATIVE FREE 10 ML: 5 INJECTION INTRAVENOUS at 05:03

## 2018-03-05 RX ADMIN — TAMSULOSIN HYDROCHLORIDE 0.4 MG: 0.4 CAPSULE ORAL at 08:03

## 2018-03-05 RX ADMIN — METHOCARBAMOL 500 MG: 500 TABLET ORAL at 08:03

## 2018-03-05 NOTE — ASSESSMENT & PLAN NOTE
66 yo male with HTN who presented with symptomatic anemia. Had a respiratory decompensation on floor and transferred to ICU. SOWMYA showed aortic and mitral valve vegetations suggestive of bacterial endocarditis.     - Blood culture from 2/25 growing Enterococcus Faecalis, sensitive to ampicillin Positive Bcx on 2/27. BCx 3/1 NGTD  - Continue ampicillin 12g/day iv divided q4hr (or continuous) + ceftriaxone 2g iv q12hr for 6 weeks  - F/u with ID in clinic in 4 weeks

## 2018-03-05 NOTE — CONSULTS
Double lumen PICC placed in right basilic vein of RUE, 35cm in length with 0cm exposed and 27cm arm circumference. Lot#[id number].

## 2018-03-05 NOTE — HPI
Rodolfo Messina is a 67 y.o. male with HTN and chronic anemia, admitted for cough with progressive respiratory failure and hypoxemia ultimately diagnosed with endocarditis with blood cultures positive for enterococcus faecalis currently on rocephin and amp. Urology was consulted due to urinary retention and hematuria.    He was seen in urology clinic in January and February earlier this year for elevated PVR to about 300 mL in the ED when being evaluated for back pain. He was started on flomax but ultimately managed with an indwelling lackey catheter with plans for follow up for outpatient SUDS/Cysto. He was unable to get this done due to this current admission. He had a UTI positive for Proteus diagnosed during catheter exchange which was treated with Bactrim. He had an renal US on 2/23 which showed medical renal disease. His creatinine has been stable, most recent was 1.4 which is near baseline. His lackey was removed on 3/2 and the patient has been able to void without intermittent caths. He has developed some small clots and dark urine but has noted the color to be improving over the last 3 days. He has not had to strain to void or had suprapubic pain. Bladder scan today showed 520 after voiding 325, but denied symptoms of having to void. He c/o nocturia x1, minimal daytime frequency, good FOS, and denies sensation of incomplete bladder emptying, hesitancy, or intermittency.

## 2018-03-05 NOTE — NURSING
Pt voided 325ml of dark red urine at this time. Bladder scan done immediate afterwards and noted to be 520ml in bladder. Pt denies any discomfort or feelings of needing to void again.

## 2018-03-05 NOTE — ASSESSMENT & PLAN NOTE
Patient is asymptomatic from a urinary retention standpoint despite PVRs 300-500mL.  Creatinine has been stable.  Would restart flomax 0.8mg (BID dosing)  Will follow up outpatient for SUDS to evaluate bladder emptying  Please call as above for possible clot retention.

## 2018-03-05 NOTE — PT/OT/SLP PROGRESS
Physical Therapy      Patient Name:  Rodolfo Messina   MRN:  [id number]    Patient not seen today secondary to patient being out of room during PT attempt. Will follow-up at next scheduled date.    Luís Rocha, PT

## 2018-03-05 NOTE — PROGRESS NOTES
Progress Note   Hospital Medicine       [place]: [facility] [place] [facility] B John Agudelo MD  Admit Date: 2/21/2018  Length of Stay:  LOS: 12 days   ELIEL 3/7/2018  Principal Problem:  Acute hypoxemic respiratory failure    Interval hx / overnight events: C/W hematuria, will consult urology, may need irrigation, to transfuse to units PRBC    Areas of concern/ handoff:    Review of Systems   Constitutional: Negative.    HENT: Negative.    Cardiovascular: Negative.    Gastrointestinal: Negative.    Genitourinary: Negative.    Musculoskeletal: Negative.    Endo/Heme/Allergies: Negative.        Temp:  [97.7 °F (36.5 °C)-99.3 °F (37.4 °C)]   Pulse:  []   Resp:  [16-20]   BP: (126-136)/(62-82)   SpO2:  [94 %-100 %]       Physical Exam   Constitutional: He is oriented to person, place, and time.   HENT:   Head: Normocephalic and atraumatic.   Eyes: Conjunctivae are normal.   Neck: Normal range of motion. Neck supple.   Cardiovascular: Normal rate and regular rhythm.    Pulmonary/Chest: Breath sounds normal.   Abdominal: Soft. Bowel sounds are normal.   Musculoskeletal: Normal range of motion.   Neurological: He is oriented to person, place, and time.   Skin: Skin is warm and dry.       Recent Labs  Lab 02/28/18  0331 03/01/18  0503 03/02/18  0400 03/03/18  0523 03/04/18 0344 03/05/18  0410   WBC 8.36 8.02 7.79 7.44 8.12 7.80   HGB 7.6* 7.0* 7.1* 6.5* 6.6* 6.4*   HCT 23.6* 22.0* 22.4* 20.4* 20.7* 19.9*    155 179 163 179 169       Recent Labs  Lab 03/03/18  0523  03/04/18  0344 03/04/18  0757 03/04/18 2010 03/05/18  0410 03/05/18  0752   NA  --   < >  --  141 141  --  141   K  --   < >  --  3.9 3.9  --  3.7   CL  --   < >  --  106 106  --  108   CO2  --   < >  --  26 24  --  23   BUN  --   < >  --  41* 45*  --  39*   CREATININE  --   < >  --  1.3 1.6*  --  1.4   GLU  --   < >  --  99 104  --  99   CALCIUM  --   < >  --  9.2 9.0  --  8.8   MG 1.9  --  2.0  --   --  1.9  --    PHOS  --   < >  --  4.2 4.4  --  4.3   < > =  values in this interval not displayed.    Recent Labs  Lab 03/03/18  0523  03/04/18  0344  03/04/18 2010 03/05/18 0410 03/05/18  0752   ALKPHOS 64  --  68  --   --  62  --    ALT 65*  --  55*  --   --  53*  --    AST 60*  --  43*  --   --  43*  --    ALBUMIN 2.4*  < > 2.2*  < > 2.6* 2.3* 2.5*   PROT 5.7*  --  5.9*  --   --  5.8*  --    BILITOT 0.8  --  0.6  --   --  0.5  --    < > = values in this interval not displayed.  No results for input(s): POCTGLUCOSE in the last 168 hours.        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]  Yes    Mitral and aortic valve regurgitation [I08.0]  Yes    Pulmonary hypertension due to mitral valve disease [I27.22, I05.9]  Yes    Subacute bacterial endocarditis [I33.0]  Yes    Acute cardiogenic pulmonary edema [I50.1]  Yes    Endocarditis [I38]  Yes    Subacute infective endocarditis [I33.0]  No    Symptomatic anemia [D64.9]  Yes    ANNA (acute kidney injury) [N17.9]  Yes    Chronic cough [R05]  Yes    Pulmonary nodules [R91.8]  Yes    Anemia [D64.9]  Yes    Anxiety [F41.9]  Yes    HTN (hypertension) [I10]  Yes     Chronic      Resolved Hospital Problems    Diagnosis Date Resolved POA    Asymptomatic bacteriuria [R82.71] 02/27/2018 Yes    Shortness of breath [R06.02] 02/27/2018 Yes    Mild malnutrition [E44.1] 02/27/2018 Yes       Acute hypoxemic respiratory failure     --2/2 cardiogenic pulmonary edema given endocarditis with moderate/severe MR and AI in the setting of having received >3  L crystalloids, 2 units PRBC's during first days of admission.  --continuing to diurese and oxygen requirements slowly improving; continue to wean HFNC as tolerated for goal saturations of 90%  -- Sputum culture from 2/25 not performed as inadequate specimen. Not currently with productive cough so hold on recollect  --concern for possible DAH previously given concurrent anemia with acute worsening hypoxemia/bilateral infiltrates. However, now with scant amount of hemoptysis. MAGNO negative. Awaiting ANCA, glomerular basement membrane antibody             Pulmonary nodules     --unclear etiology; inflammation, infection, vs other  --non-smoker, h/o occupational asbestos exposure  --repeat CT in 3-6 months                   r   HTN (hypertension)     --continue home amlodipine          Acute cardiogenic pulmonary edema     --2/2 MR and AI. See above          Subacute infective endocarditis     --appreciate cardiology's and CTS assistance. See above for plan           Aortic valve endocarditis     --noted on SOWMYA 2/27. Along with mitral valve endocarditis and associated AI, MR.  --one blood culture from 2/25 with GPC's awaiting speciation. Appreciate ID assistance. sis  --CTS consulted and recommending finishing Abx therapy, optimize volume status and follow up with CTS when stable.   --unclear initial source of infection   BC with enterococcus Faecalis, will need 6 weeks total of amp and rocephin from 2/27  PICC to be placed today          Mitral and aortic valve regurgitation     --2/2 endocarditis. See below  --optimize volume status as tolerated                  ANNA (acute kidney injury)     --previously attributed to bactrim (started for proteus UTI), now d/c'd; baseline creatinine ~ 1.1  --Renal US with evidence of medical renal disease  --creatinine slowly improving with diuresis. Continue to trend                  Anemia     --no evidence of ongoing GIB, previous EGD and C scope without source of bleeding. Minimal  amounts of hemoptysis currently.   --hemolytic labs negative. Inappropriately low reticulocytes  --s/p 2 units this admission (on 2/22 and 2/23). Continue to monitor and transfuse for Hgb <7       Hematuria    Will consult Urology  Flomax increased to.8mg BID       Time spent in care of the patient (Greater than 1/2 spent in direct face-to-face contact) 30 minutes      John Agudelo MD

## 2018-03-05 NOTE — PLAN OF CARE
Discharge planning: Patient not medically ready for d/c yet. Plan is for home health with IV antibiotics. Referral sent to [place] via [facility].

## 2018-03-05 NOTE — CONSULTS
[facility]  Urology  Consult Note    Patient Name: Rodolfo Messina  MRN: [id number]  Admission Date: 2/21/2018  Hospital Length of Stay: 12   Code Status: Full Code   Attending Provider: Katie Colon MD   Consulting Provider: Chiqui Zavala MD  Primary Care Physician: Deric Claudio MD  Principal Problem:Acute hypoxemic respiratory failure    Inpatient consult to Urology  Consult performed by: CHIQUI ZAVALA  Consult ordered by: KATIE COLON          Subjective:     HPI:  Rodolfo Messina is a 67 y.o. male with HTN and chronic anemia, admitted for cough with progressive respiratory failure and hypoxemia ultimately diagnosed with endocarditis with blood cultures positive for enterococcus faecalis currently on rocephin and amp. Urology was consulted due to urinary retention and hematuria.    He was seen in urology clinic in January and February earlier this year for elevated PVR to about 300 mL in the ED when being evaluated for back pain. He was started on flomax but ultimately managed with an indwelling lackey catheter with plans for follow up for outpatient SUDS/Cysto. He was unable to get this done due to this current admission. He had a UTI positive for Proteus diagnosed during catheter exchange which was treated with Bactrim. He had an renal US on 2/23 which showed medical renal disease. His creatinine has been stable, most recent was 1.4 which is near baseline. His lackey was removed on 3/2 and the patient has been able to void without intermittent caths. He has developed some small clots and dark urine but has noted the color to be improving over the last 3 days. He has not had to strain to void or had suprapubic pain. Bladder scan today showed 520 after voiding 325, but denied symptoms of having to void. He c/o nocturia x1, minimal daytime frequency, good FOS, and denies sensation of incomplete bladder emptying, hesitancy, or intermittency.    Past Medical History:   Diagnosis  Date    ANNA (acute kidney injury) 2/22/2018    Anemia     Anxiety     BPH (benign prostatic hyperplasia)     Diverticulosis     Hypertension     Urinary retention        Past Surgical History:   Procedure Laterality Date    APPENDECTOMY      COLONOSCOPY      COLONOSCOPY N/A 2/1/2018    Procedure: COLONOSCOPY;  Surgeon: Richar Payan MD;  Location: [address]);  Service: Endoscopy;  Laterality: N/A;  PM prep    EYE SURGERY Right     cataract extraction    FINGER SURGERY      FRACTURE SURGERY Right     index finger    TONSILLECTOMY         Review of patient's allergies indicates:   Allergen Reactions    Augmentin [amoxicillin-pot clavulanate]      Patient felt that it raised BP and requested to have it added as intolerance. Tolerated unasyn    Ciprofloxacin     Flagyl [metronidazole]     Lisinopril Other (See Comments)     cough    Mysoline [primidone]     Omnicef [cefdinir]     Shellfish containing products        Family History     Problem Relation (Age of Onset)    Heart disease Mother, Father    No Known Problems Sister, Brother    Stroke Mother          Social History Main Topics    Smoking status: Never Smoker    Smokeless tobacco: Never Used    Alcohol use Yes      Comment: none recently    Drug use: No    Sexual activity: Not Currently     Partners: Female       Review of Systems   Constitutional: Negative for activity change, appetite change, chills and fever.   Eyes: Negative for pain and discharge.   Respiratory: Positive for cough and shortness of breath.    Cardiovascular: Negative for chest pain and leg swelling.   Gastrointestinal: Negative for abdominal distention and blood in stool.   Genitourinary: Positive for hematuria. Negative for dysuria.   Musculoskeletal: Negative for joint swelling and myalgias.   Psychiatric/Behavioral: Negative for hallucinations. The patient is not nervous/anxious.        Objective:     Temp:  [98 °F (36.7 °C)-99.3 °F (37.4 °C)] 98.2  °F (36.8 °C)  Pulse:  [] 96  Resp:  [14-18] 16  SpO2:  [91 %-98 %] 91 %  BP: (121-136)/(57-82) 121/59     Body mass index is 24.74 kg/m².      Date 03/05/18 0700 - 03/06/18 0659   Shift 6455-8787 9483-2469 2021-4356 24 Hour Total   I  N  T  A  K  E   Blood  0  0    Shift Total  (mL/kg)  0  (0)  0  (0)   O  U  T  P  U  T   Shift Total  (mL/kg)       Weight (kg) 76 76 76 76     Bladder Scan Volume (mL): 320 mL (03/04/18 1200)    Drains          No matching active lines, drains, or airways          Physical Exam   Nursing note and vitals reviewed.  Constitutional: He is oriented to person, place, and time. He appears well-developed and well-nourished. No distress.   HENT:   Head: Normocephalic and atraumatic.   Eyes: EOM are normal. Right conjunctiva has a hemorrhage. No scleral icterus.   Neck: Normal range of motion. Neck supple.   Cardiovascular: Normal rate and intact distal pulses.    Pulmonary/Chest: Effort normal. No respiratory distress.   Abdominal: Soft. He exhibits no distension. There is no tenderness.   No suprapubic tenderness   Genitourinary: Rectum normal and penis normal.   Genitourinary Comments: Prostate 35 g no nodules, induration   Musculoskeletal: He exhibits no edema or deformity.   Neurological: He is alert and oriented to person, place, and time.   Skin: Skin is warm and dry.     Psychiatric: He has a normal mood and affect. His behavior is normal. Thought content normal.     Significant Labs:    BMP:    Recent Labs  Lab 03/04/18  0757 03/04/18 2010 03/05/18  0752    141 141   K 3.9 3.9 3.7    106 108   CO2 26 24 23   BUN 41* 45* 39*   CREATININE 1.3 1.6* 1.4   CALCIUM 9.2 9.0 8.8       CBC:    Recent Labs  Lab 03/03/18  0523 03/04/18  0344 03/05/18  0410   WBC 7.44 8.12 7.80   HGB 6.5* 6.6* 6.4*   HCT 20.4* 20.7* 19.9*    179 169       All pertinent labs results from the past 24 hours have been reviewed.    Significant Imaging:  All pertinent imaging results/findings  from the past 24 hours have been reviewed.                Assessment and Plan:     Hematuria, unspecified    Unclear etiology at this time. Possibly due to traumatic lackey / indwelling lackey irritation.   Plan to undergo cystoscopy with SUDS and can repeat upper tract imaging at that time.  We will follow up in the AM to ensure his hematuria continues to clear  Please call if patient has retention with severe urge to void        Urinary retention with incomplete bladder emptying    Patient is asymptomatic from a urinary retention standpoint despite PVRs 300-500mL.  Creatinine has been stable.  Would restart flomax 0.8mg (BID dosing)  Will follow up outpatient for SUDS to evaluate bladder emptying  Please call as above for possible clot retention.            VTE Risk Mitigation         Ordered     Medium Risk of VTE  Once      02/22/18 0611     Place sequential compression device  Until discontinued      02/22/18 0343     Place MOHAN hose  Until discontinued      02/22/18 0343          Thank you for your consult. I will follow-up with patient. Please contact us if you have any additional questions.    Leo Salas MD  Urology  [facility]

## 2018-03-05 NOTE — PROCEDURES
"Rodolfo Messina is a 67 y.o. male patient.    Temp: 98.8 °F (37.1 °C) (03/05/18 1411)  Pulse: 88 (03/05/18 1504)  Resp: 14 (03/05/18 1411)  BP: (!) 128/57 (03/05/18 1411)  SpO2: 98 % (03/05/18 1411)  Weight: 76 kg (167 lb 8.8 oz) (03/02/18 2027)  Height: 5' 9" (175.3 cm) (03/02/18 2027)    PICC  Date/Time: 3/5/2018 3:40 PM  Performed by: HUBERT STEVENSON  Consent Done: Yes  Time out: Immediately prior to procedure a time out was called to verify the correct patient, procedure, equipment, support staff and site/side marked as required  Indications: med administration and vascular access  Anesthesia: local infiltration  Local anesthetic: lidocaine 1% without epinephrine  Anesthetic Total (mL): 2  Preparation: skin prepped with ChloraPrep  Skin prep agent dried: skin prep agent completely dried prior to procedure  Sterile barriers: all five maximum sterile barriers used - cap, mask, sterile gown, sterile gloves, and large sterile sheet  Hand hygiene: hand hygiene performed prior to central venous catheter insertion  Location details: right basilic  Catheter type: double lumen  Catheter size: 5 Fr  Catheter Length: 35cm    Ultrasound guidance: yes  Vessel Caliber: medium and patent, compressibility normal  Vascular Doppler: not done  Needle advanced into vessel with real time Ultrasound guidance.  Guidewire confirmed in vessel.  Image recorded and saved.  Sterile sheath used.  Number of attempts: 1  Post-procedure: blood return through all ports, chlorhexidine patch and sterile dressing applied  Technical procedures used: 3cg  Specimens: No  Implants: No  Assessment: placement verified by x-ray  Complications: none        Tomeka Blevins  3/5/2018  "

## 2018-03-05 NOTE — SUBJECTIVE & OBJECTIVE
Past Medical History:   Diagnosis Date    ANNA (acute kidney injury) 2/22/2018    Anemia     Anxiety     BPH (benign prostatic hyperplasia)     Diverticulosis     Hypertension     Urinary retention        Past Surgical History:   Procedure Laterality Date    APPENDECTOMY      COLONOSCOPY      COLONOSCOPY N/A 2/1/2018    Procedure: COLONOSCOPY;  Surgeon: Richar Payan MD;  Location: [address]);  Service: Endoscopy;  Laterality: N/A;  PM prep    EYE SURGERY Right     cataract extraction    FINGER SURGERY      FRACTURE SURGERY Right     index finger    TONSILLECTOMY         Review of patient's allergies indicates:   Allergen Reactions    Augmentin [amoxicillin-pot clavulanate]      Patient felt that it raised BP and requested to have it added as intolerance. Tolerated unasyn    Ciprofloxacin     Flagyl [metronidazole]     Lisinopril Other (See Comments)     cough    Mysoline [primidone]     Omnicef [cefdinir]     Shellfish containing products        Family History     Problem Relation (Age of Onset)    Heart disease Mother, Father    No Known Problems Sister, Brother    Stroke Mother          Social History Main Topics    Smoking status: Never Smoker    Smokeless tobacco: Never Used    Alcohol use Yes      Comment: none recently    Drug use: No    Sexual activity: Not Currently     Partners: Female       Review of Systems   Constitutional: Negative for activity change, appetite change, chills and fever.   Eyes: Negative for pain and discharge.   Respiratory: Positive for cough and shortness of breath.    Cardiovascular: Negative for chest pain and leg swelling.   Gastrointestinal: Negative for abdominal distention and blood in stool.   Genitourinary: Positive for hematuria. Negative for dysuria.   Musculoskeletal: Negative for joint swelling and myalgias.   Psychiatric/Behavioral: Negative for hallucinations. The patient is not nervous/anxious.        Objective:     Temp:  [98 °F  (36.7 °C)-99.3 °F (37.4 °C)] 98.2 °F (36.8 °C)  Pulse:  [] 96  Resp:  [14-18] 16  SpO2:  [91 %-98 %] 91 %  BP: (121-136)/(57-82) 121/59     Body mass index is 24.74 kg/m².      Date 03/05/18 0700 - 03/06/18 0659   Shift 8949-2680 2719-0015 9784-3838 24 Hour Total   I  N  T  A  K  E   Blood  0  0    Shift Total  (mL/kg)  0  (0)  0  (0)   O  U  T  P  U  T   Shift Total  (mL/kg)       Weight (kg) 76 76 76 76     Bladder Scan Volume (mL): 320 mL (03/04/18 1200)    Drains          No matching active lines, drains, or airways          Physical Exam   Nursing note and vitals reviewed.  Constitutional: He is oriented to person, place, and time. He appears well-developed and well-nourished. No distress.   HENT:   Head: Normocephalic and atraumatic.   Eyes: EOM are normal. Right conjunctiva has a hemorrhage. No scleral icterus.   Neck: Normal range of motion. Neck supple.   Cardiovascular: Normal rate and intact distal pulses.    Pulmonary/Chest: Effort normal. No respiratory distress.   Abdominal: Soft. He exhibits no distension. There is no tenderness.   No suprapubic tenderness   Genitourinary: Rectum normal and penis normal.   Genitourinary Comments: Prostate 35 g no nodules, induration   Musculoskeletal: He exhibits no edema or deformity.   Neurological: He is alert and oriented to person, place, and time.   Skin: Skin is warm and dry.     Psychiatric: He has a normal mood and affect. His behavior is normal. Thought content normal.     Significant Labs:    BMP:    Recent Labs  Lab 03/04/18  0757 03/04/18 2010 03/05/18  0752    141 141   K 3.9 3.9 3.7    106 108   CO2 26 24 23   BUN 41* 45* 39*   CREATININE 1.3 1.6* 1.4   CALCIUM 9.2 9.0 8.8       CBC:    Recent Labs  Lab 03/03/18  0523 03/04/18  0344 03/05/18  0410   WBC 7.44 8.12 7.80   HGB 6.5* 6.6* 6.4*   HCT 20.4* 20.7* 19.9*    179 169       All pertinent labs results from the past 24 hours have been reviewed.    Significant Imaging:  All  pertinent imaging results/findings from the past 24 hours have been reviewed.

## 2018-03-05 NOTE — PROGRESS NOTES
[facility]  Infectious Disease  Progress Note    Patient Name: Rodolfo Messina  MRN: [id number]  Admission Date: 2/21/2018  Length of Stay: 12 days  Attending Physician: John Agudelo MD  Primary Care Provider: Deric Claudio MD    Isolation Status: No active isolations  Assessment/Plan:      Subacute infective endocarditis    66 yo male with HTN who presented with symptomatic anemia. Had a respiratory decompensation on floor and transferred to ICU. SOWMYA showed aortic and mitral valve vegetations suggestive of bacterial endocarditis.     - Blood culture from 2/25 growing Enterococcus Faecalis, sensitive to ampicillin Positive Bcx on 2/27. BCx 3/1 NGTD  - Continue ampicillin 12g/day iv divided q4hr (or continuous) + ceftriaxone 2g iv q12hr for 6 weeks  - F/u with ID in clinic in 4 weeks            Thank you for your consult. I will follow-up with patient. Please contact us if you have any additional questions.    Zain Niño MD  Infectious Disease  [facility]    Subjective:     Principal Problem:Acute hypoxemic respiratory failure    HPI:  Mr. Messina is a 67 year old man with HTN, urinary retention (indwelling lackey), chronic back pain, essential tremor, anxiety and depression who was initially admitted with recurrent anemia (Hgb 6.7 on admit). Evaluated by GI who did not think patient was having an upper or lower GI bleed. He was also evaluated by hem-onc who recommended any outpatient bone marrow biopsy. He received 2 units of pRBC while admitted. Patient states that he has had a chronic cough for the 2 months and occasional fevers for which he received steroids and antibiotics. CTA chest on admit shows multiple nodules and micronodular clusters throughout the right lung, consistent with small airway infectious versus noninfectious inflammation but not excluding malignancy (patient has history of occupational asbestos exposure and smoking). Quantiferon gold was  indeterminate. He was evaluated by pulmonary who recommend duo-nebs and outpatient follow-up.  He was also seen by ID for recent UTI who recommend augmentin x 7 days. On 2/24, patient developed acute respiratory failure required up to 30 L O2 and was transferred to ICU. CXR showed  interval development of bilateral patchy interstitial and parietal attenuation reflective of edema vs infection. He was started on vanc, unasyn, and azithromycin. 2D echo revealed evidence of aortic valve vegetation. SOWMYA performed on 2/27 showed vegetation on aortic and mitral valve associated with severe aortic and mitral regurg. Blood culture from 2/26 grew gram pos cocci in 1 of 4 bottles.     Patient denies any recent dental work. He did receive blood transfusions within the last month related to anemia. No recent travel. Patient does have cats that have scratched him in the past.  Interval History: no acute event overnight. Patient feels good.     Review of Systems   Constitutional: Negative for chills and fever.   HENT: Negative for nosebleeds, sore throat and trouble swallowing.    Respiratory: Negative for cough, chest tightness, shortness of breath and wheezing.    Cardiovascular: Negative for chest pain and palpitations.   Gastrointestinal: Negative for abdominal distention, abdominal pain, blood in stool, diarrhea, nausea and vomiting.   Genitourinary: Negative for dysuria, frequency, hematuria and urgency.   Neurological: Positive for tremors. Negative for speech difficulty, weakness, light-headedness and headaches.     Objective:     Vital Signs (Most Recent):  Temp: 98.8 °F (37.1 °C) (03/05/18 1411)  Pulse: 91 (03/05/18 1411)  Resp: 14 (03/05/18 1411)  BP: (!) 128/57 (03/05/18 1411)  SpO2: 98 % (03/05/18 1411) Vital Signs (24h Range):  Temp:  [98 °F (36.7 °C)-99.3 °F (37.4 °C)] 98.8 °F (37.1 °C)  Pulse:  [] 91  Resp:  [14-20] 14  SpO2:  [92 %-100 %] 98 %  BP: (123-136)/(57-82) 128/57     Weight: 76 kg (167 lb 8.8  oz)  Body mass index is 24.74 kg/m².    Estimated Creatinine Clearance: 51.2 mL/min (based on SCr of 1.4 mg/dL).    Physical Exam   Constitutional: He is oriented to person, place, and time. He appears well-developed and well-nourished.   HENT:   Head: Normocephalic and atraumatic.   Right Ear: External ear normal.   Left Ear: External ear normal.   No oral ulceration    Eyes: EOM are normal.   Neck: Neck supple. No JVD present.   Cardiovascular: Normal rate, regular rhythm and intact distal pulses.    Murmur (systolic mumur in 4th left intercostal space) heard.  Pulmonary/Chest: He has no wheezes. He has no rales.   Abdominal: Soft. Bowel sounds are normal. He exhibits no distension. There is no tenderness.   Musculoskeletal: He exhibits no edema.   Neurological: He is alert and oriented to person, place, and time.   Skin: Skin is warm and dry. Capillary refill takes less than 2 seconds.   No obvious skin breaks     Psychiatric: He has a normal mood and affect. His behavior is normal.       Significant Labs: All pertinent labs within the past 24 hours have been reviewed.    Significant Imaging: I have reviewed all pertinent imaging results/findings within the past 24 hours.

## 2018-03-05 NOTE — ASSESSMENT & PLAN NOTE
Unclear etiology at this time. Possibly due to traumatic lackey / indwelling lackey irritation.   Plan to undergo cystoscopy with SUDS and can repeat upper tract imaging at that time.  We will follow up in the AM to ensure his hematuria continues to clear  Please call if patient has retention with severe urge to void

## 2018-03-06 VITALS
DIASTOLIC BLOOD PRESSURE: 61 MMHG | SYSTOLIC BLOOD PRESSURE: 130 MMHG | HEART RATE: 88 BPM | RESPIRATION RATE: 20 BRPM | BODY MASS INDEX: 24.82 KG/M2 | WEIGHT: 167.56 LBS | OXYGEN SATURATION: 93 % | HEIGHT: 69 IN | TEMPERATURE: 99 F

## 2018-03-06 PROBLEM — R31.9 HEMATURIA, UNSPECIFIED: Status: RESOLVED | Noted: 2018-03-05 | Resolved: 2018-03-06

## 2018-03-06 PROBLEM — R33.9 URINARY RETENTION WITH INCOMPLETE BLADDER EMPTYING: Status: ACTIVE | Noted: 2018-03-06

## 2018-03-06 PROBLEM — R31.9 HEMATURIA, UNSPECIFIED: Status: RESOLVED | Noted: 2018-03-06 | Resolved: 2018-03-06

## 2018-03-06 LAB
ALBUMIN SERPL BCP-MCNC: 2.4 G/DL
ALBUMIN SERPL BCP-MCNC: 2.4 G/DL
ALP SERPL-CCNC: 65 U/L
ALT SERPL W/O P-5'-P-CCNC: 51 U/L
ANION GAP SERPL CALC-SCNC: 10 MMOL/L
AST SERPL-CCNC: 37 U/L
BACTERIA BLD CULT: NORMAL
BASOPHILS # BLD AUTO: 0.02 K/UL
BASOPHILS # BLD AUTO: 0.04 K/UL
BASOPHILS NFR BLD: 0.3 %
BASOPHILS NFR BLD: 0.5 %
BILIRUB DIRECT SERPL-MCNC: 0.3 MG/DL
BILIRUB SERPL-MCNC: 0.6 MG/DL
BNP SERPL-MCNC: 194 PG/ML
BUN SERPL-MCNC: 39 MG/DL
CALCIUM SERPL-MCNC: 8.6 MG/DL
CHLORIDE SERPL-SCNC: 106 MMOL/L
CO2 SERPL-SCNC: 24 MMOL/L
CREAT SERPL-MCNC: 1.4 MG/DL
DIFFERENTIAL METHOD: ABNORMAL
DIFFERENTIAL METHOD: ABNORMAL
EOSINOPHIL # BLD AUTO: 0.5 K/UL
EOSINOPHIL # BLD AUTO: 0.7 K/UL
EOSINOPHIL NFR BLD: 6.1 %
EOSINOPHIL NFR BLD: 8.6 %
ERYTHROCYTE [DISTWIDTH] IN BLOOD BY AUTOMATED COUNT: 18.2 %
ERYTHROCYTE [DISTWIDTH] IN BLOOD BY AUTOMATED COUNT: 18.6 %
EST. GFR  (AFRICAN AMERICAN): 59.7 ML/MIN/1.73 M^2
EST. GFR  (NON AFRICAN AMERICAN): 51.6 ML/MIN/1.73 M^2
GLUCOSE SERPL-MCNC: 97 MG/DL
HCT VFR BLD AUTO: 24.6 %
HCT VFR BLD AUTO: 24.6 %
HGB BLD-MCNC: 8 G/DL
HGB BLD-MCNC: 8.2 G/DL
IMM GRANULOCYTES # BLD AUTO: 0.02 K/UL
IMM GRANULOCYTES # BLD AUTO: 0.03 K/UL
IMM GRANULOCYTES NFR BLD AUTO: 0.3 %
IMM GRANULOCYTES NFR BLD AUTO: 0.4 %
LYMPHOCYTES # BLD AUTO: 1 K/UL
LYMPHOCYTES # BLD AUTO: 1.3 K/UL
LYMPHOCYTES NFR BLD: 12.6 %
LYMPHOCYTES NFR BLD: 17.4 %
MAGNESIUM SERPL-MCNC: 1.9 MG/DL
MCH RBC QN AUTO: 27.7 PG
MCH RBC QN AUTO: 28.8 PG
MCHC RBC AUTO-ENTMCNC: 32.5 G/DL
MCHC RBC AUTO-ENTMCNC: 33.3 G/DL
MCV RBC AUTO: 85 FL
MCV RBC AUTO: 86 FL
MONOCYTES # BLD AUTO: 0.7 K/UL
MONOCYTES # BLD AUTO: 0.7 K/UL
MONOCYTES NFR BLD: 8.6 %
MONOCYTES NFR BLD: 9.2 %
NEUTROPHILS # BLD AUTO: 4.8 K/UL
NEUTROPHILS # BLD AUTO: 5.5 K/UL
NEUTROPHILS NFR BLD: 64 %
NEUTROPHILS NFR BLD: 72 %
NRBC BLD-RTO: 0 /100 WBC
NRBC BLD-RTO: 0 /100 WBC
PHOSPHATE SERPL-MCNC: 3.8 MG/DL
PLATELET # BLD AUTO: 195 K/UL
PLATELET # BLD AUTO: 201 K/UL
PMV BLD AUTO: 10.8 FL
PMV BLD AUTO: 10.9 FL
POTASSIUM SERPL-SCNC: 3.9 MMOL/L
PROT SERPL-MCNC: 6.1 G/DL
RBC # BLD AUTO: 2.85 M/UL
RBC # BLD AUTO: 2.89 M/UL
SODIUM SERPL-SCNC: 140 MMOL/L
WBC # BLD AUTO: 7.53 K/UL
WBC # BLD AUTO: 7.56 K/UL

## 2018-03-06 PROCEDURE — A4216 STERILE WATER/SALINE, 10 ML: HCPCS | Performed by: INTERNAL MEDICINE

## 2018-03-06 PROCEDURE — 83735 ASSAY OF MAGNESIUM: CPT

## 2018-03-06 PROCEDURE — 25000003 PHARM REV CODE 250: Performed by: NURSE PRACTITIONER

## 2018-03-06 PROCEDURE — 97116 GAIT TRAINING THERAPY: CPT

## 2018-03-06 PROCEDURE — 83880 ASSAY OF NATRIURETIC PEPTIDE: CPT

## 2018-03-06 PROCEDURE — 63600175 PHARM REV CODE 636 W HCPCS: Performed by: INTERNAL MEDICINE

## 2018-03-06 PROCEDURE — 25000003 PHARM REV CODE 250: Performed by: INTERNAL MEDICINE

## 2018-03-06 PROCEDURE — 80069 RENAL FUNCTION PANEL: CPT

## 2018-03-06 PROCEDURE — 99239 HOSP IP/OBS DSCHRG MGMT >30: CPT | Mod: ,,, | Performed by: HOSPITALIST

## 2018-03-06 PROCEDURE — 25000003 PHARM REV CODE 250: Performed by: HOSPITALIST

## 2018-03-06 PROCEDURE — 85025 COMPLETE CBC W/AUTO DIFF WBC: CPT

## 2018-03-06 PROCEDURE — 80076 HEPATIC FUNCTION PANEL: CPT

## 2018-03-06 PROCEDURE — 97530 THERAPEUTIC ACTIVITIES: CPT

## 2018-03-06 PROCEDURE — 86481 TB AG RESPONSE T-CELL SUSP: CPT

## 2018-03-06 PROCEDURE — 36415 COLL VENOUS BLD VENIPUNCTURE: CPT

## 2018-03-06 RX ORDER — FUROSEMIDE 40 MG/1
40 TABLET ORAL DAILY
Qty: 30 TABLET | Refills: 1 | Status: SHIPPED | OUTPATIENT
Start: 2018-03-06 | End: 2018-07-30 | Stop reason: SDUPTHER

## 2018-03-06 RX ORDER — RAMELTEON 8 MG/1
8 TABLET ORAL NIGHTLY PRN
Qty: 30 TABLET | Refills: 1 | Status: SHIPPED | OUTPATIENT
Start: 2018-03-06 | End: 2018-03-06

## 2018-03-06 RX ORDER — BENZONATATE 100 MG/1
100 CAPSULE ORAL 3 TIMES DAILY PRN
Qty: 90 CAPSULE | Refills: 2 | Status: SHIPPED | OUTPATIENT
Start: 2018-03-06 | End: 2018-03-06

## 2018-03-06 RX ORDER — AMLODIPINE BESYLATE 10 MG/1
10 TABLET ORAL DAILY
Qty: 30 TABLET | Refills: 1 | Status: SHIPPED | OUTPATIENT
Start: 2018-03-06 | End: 2018-03-20 | Stop reason: SDUPTHER

## 2018-03-06 RX ORDER — FUROSEMIDE 40 MG/1
40 TABLET ORAL DAILY
Qty: 30 TABLET | Refills: 1 | Status: SHIPPED | OUTPATIENT
Start: 2018-03-06 | End: 2018-03-06

## 2018-03-06 RX ORDER — AMOXICILLIN 250 MG
2 CAPSULE ORAL DAILY
COMMUNITY
Start: 2018-03-06 | End: 2018-03-16 | Stop reason: ALTCHOICE

## 2018-03-06 RX ORDER — IPRATROPIUM BROMIDE AND ALBUTEROL SULFATE 2.5; .5 MG/3ML; MG/3ML
3 SOLUTION RESPIRATORY (INHALATION) EVERY 6 HOURS PRN
Qty: 1 BOX | Refills: 0 | Status: SHIPPED | OUTPATIENT
Start: 2018-03-06 | End: 2018-03-06 | Stop reason: HOSPADM

## 2018-03-06 RX ORDER — IPRATROPIUM BROMIDE AND ALBUTEROL SULFATE 2.5; .5 MG/3ML; MG/3ML
3 SOLUTION RESPIRATORY (INHALATION) EVERY 6 HOURS PRN
Qty: 1 BOX | Refills: 0 | Status: SHIPPED | OUTPATIENT
Start: 2018-03-06 | End: 2018-03-06

## 2018-03-06 RX ORDER — FLUTICASONE PROPIONATE 50 MCG
1 SPRAY, SUSPENSION (ML) NASAL 2 TIMES DAILY
Qty: 1 BOTTLE | Refills: 11 | Status: SHIPPED | OUTPATIENT
Start: 2018-03-06 | End: 2018-03-25

## 2018-03-06 RX ORDER — FUROSEMIDE 40 MG/1
40 TABLET ORAL 2 TIMES DAILY
Status: DISCONTINUED | OUTPATIENT
Start: 2018-03-06 | End: 2018-03-07 | Stop reason: HOSPADM

## 2018-03-06 RX ORDER — TAMSULOSIN HYDROCHLORIDE 0.4 MG/1
0.8 CAPSULE ORAL 2 TIMES DAILY
Qty: 120 CAPSULE | Refills: 11 | Status: ON HOLD | OUTPATIENT
Start: 2018-03-06 | End: 2018-06-20 | Stop reason: HOSPADM

## 2018-03-06 RX ORDER — BENZONATATE 100 MG/1
100 CAPSULE ORAL 3 TIMES DAILY PRN
Qty: 90 CAPSULE | Refills: 2 | Status: SHIPPED | OUTPATIENT
Start: 2018-03-06 | End: 2018-03-16

## 2018-03-06 RX ORDER — TAMSULOSIN HYDROCHLORIDE 0.4 MG/1
0.8 CAPSULE ORAL NIGHTLY
Qty: 180 CAPSULE | Refills: 3 | Status: SHIPPED | OUTPATIENT
Start: 2018-03-06 | End: 2018-03-06 | Stop reason: HOSPADM

## 2018-03-06 RX ORDER — RAMELTEON 8 MG/1
8 TABLET ORAL NIGHTLY PRN
Qty: 30 TABLET | Refills: 1 | Status: SHIPPED | OUTPATIENT
Start: 2018-03-06 | End: 2018-03-16 | Stop reason: CLARIF

## 2018-03-06 RX ADMIN — FUROSEMIDE 40 MG: 40 TABLET ORAL at 06:03

## 2018-03-06 RX ADMIN — OXYCODONE HYDROCHLORIDE 5 MG: 5 TABLET ORAL at 01:03

## 2018-03-06 RX ADMIN — AMPICILLIN 2 G: 2 INJECTION, POWDER, FOR SOLUTION INTRAVENOUS at 04:03

## 2018-03-06 RX ADMIN — GUAIFENESIN AND DEXTROMETHORPHAN 5 ML: 100; 10 SYRUP ORAL at 11:03

## 2018-03-06 RX ADMIN — FUROSEMIDE 40 MG: 10 INJECTION, SOLUTION INTRAMUSCULAR; INTRAVENOUS at 08:03

## 2018-03-06 RX ADMIN — Medication 1 SPRAY: at 08:03

## 2018-03-06 RX ADMIN — CEFTRIAXONE SODIUM 2 G: 2 INJECTION, POWDER, FOR SOLUTION INTRAMUSCULAR; INTRAVENOUS at 02:03

## 2018-03-06 RX ADMIN — AMPICILLIN 2 G: 2 INJECTION, POWDER, FOR SOLUTION INTRAVENOUS at 12:03

## 2018-03-06 RX ADMIN — CEFTRIAXONE SODIUM 2 G: 2 INJECTION, POWDER, FOR SOLUTION INTRAMUSCULAR; INTRAVENOUS at 12:03

## 2018-03-06 RX ADMIN — AMLODIPINE BESYLATE 10 MG: 10 TABLET ORAL at 08:03

## 2018-03-06 RX ADMIN — BENZONATATE 100 MG: 100 CAPSULE ORAL at 09:03

## 2018-03-06 RX ADMIN — BENZONATATE 100 MG: 100 CAPSULE ORAL at 02:03

## 2018-03-06 RX ADMIN — FLUTICASONE PROPIONATE 50 MCG: 50 SPRAY, METERED NASAL at 08:03

## 2018-03-06 RX ADMIN — AMPICILLIN 2 G: 2 INJECTION, POWDER, FOR SOLUTION INTRAVENOUS at 08:03

## 2018-03-06 RX ADMIN — SODIUM CHLORIDE, PRESERVATIVE FREE 10 ML: 5 INJECTION INTRAVENOUS at 08:03

## 2018-03-06 RX ADMIN — SODIUM CHLORIDE, PRESERVATIVE FREE 10 ML: 5 INJECTION INTRAVENOUS at 12:03

## 2018-03-06 RX ADMIN — TAMSULOSIN HYDROCHLORIDE 0.8 MG: 0.4 CAPSULE ORAL at 08:03

## 2018-03-06 NOTE — PT/OT/SLP PROGRESS
Occupational Therapy   Treatment    Name: Rodolfo Messina  MRN: [id number]  Admitting Diagnosis:  Acute hypoxemic respiratory failure  7 Days Post-Op    Recommendations:     Discharge Recommendations: home with home health  Discharge Equipment Recommendations:  cane, straight  Barriers to discharge:  None    Subjective     Communicated with: RN prior to session.  Pain/Comfort:  · Pain Rating 1: 0/10  · Pain Rating Post-Intervention 1: 0/10    Patients cultural, spiritual, Syn[employer] conflicts given the current situation: none stated    Objective:     Patient found with:  (none)    General Precautions: Standard, fall   Orthopedic Precautions:N/A   Braces: N/A     Occupational Performance:    Bed Mobility:    · NT    Functional Mobility/Transfers:  · Patient completed Sit <> Stand Transfer with independence  with  no assistive device   · Patient completed Bed <> Chair Transfer using Stand Pivot technique with independence with no assistive device  · Patient completed Toilet Transfer Stand Pivot technique with independence with  no AD  · Functional Mobility: (I) no AD    Activities of Daily Living:  · UB Dressing: independence tshirt  · LB Dressing: independence underwear, pants, socks, shoes  · Toileting: independence at toilet    Patient left standing in room with call button in reach    [facility] 6 Click:  [facility] Total Score: 23    Treatment & Education:  Pt ed re OT role and POC. Pt is (I) in all ADLs except would benefit from supervision for bathing. Pt is ambulating with no assistance or AD.  Education:    Assessment:     Rodolfo Messina is a 67 y.o. male with a medical diagnosis of Acute hypoxemic respiratory failure.  He presents with improved mobility and self care performance.  Performance deficits affecting function are impaired endurance.      Rehab Prognosis:  Good; patient would benefit from OT services to address these deficits and reach maximum level of function.     Plan:     Patient to be discharged from  acute OT.    This Plan of care has been discussed with the patient who was involved in its development and understands and is in agreement with the identified goals and treatment plan    GOALS:    Occupational Therapy Goals     Not on file          Multidisciplinary Problems (Resolved)        Problem: Occupational Therapy Goal    Goal Priority Disciplines Outcome Interventions   Occupational Therapy Goal   (Resolved)     OT, PT/OT Outcome(s) achieved    Description:  Goals to be met by: 3/10/2018    Patient will increase functional independence with ADLs by performing:    UE Dressing with Modified Blanco.  LE Dressing with Modified Blanco.  Grooming while standing with Modified Bl[place].  Toileting from toilet with Modified Blanco for hygiene and clothing management.   Bathing from  edge of bed with Modified Blanco.                      Time Tracking:     OT Date of Treatment: 03/06/18  OT Start Time: 0749  OT Stop Time: 0800  OT Total Time (min): 11 min    Billable Minutes:Therapeutic Activity 11 minutes    MILTON Foster  3/6/2018  Pager: [phone]

## 2018-03-06 NOTE — PLAN OF CARE
[facility]    HOME HEALTH ORDERS  FACE TO FACE ENCOUNTER    Patient Name: Rodolfo Messina  YOB: 1950    PCP: Deric Claudio MD   PCP Address: [address] [place], [place] [address] [address]  PCP Phone Number: [phone]  PCP Fax: [phone]    Encounter Date: 03/06/2018    Admit to Home Health    Diagnoses:  Active Hospital Problems    Diagnosis  POA    *Acute hypoxemic respiratory failure [J96.01]  Yes    Hematuria, unspecified [R31.9]  Unknown    Urinary retention with incomplete bladder emptying [R33.9]  Unknown    Aortic valve endocarditis [I35.8]  Yes    Endocarditis of mitral valve [I05.8]  Yes    Mitral and aortic valve regurgitation [I08.0]  Yes    Pulmonary hypertension due to mitral valve disease [I27.22, I05.9]  Yes    Subacute bacterial endocarditis [I33.0]  Yes    Acute cardiogenic pulmonary edema [I50.1]  Yes    Endocarditis [I38]  Yes    Subacute infective endocarditis [I33.0]  No    Symptomatic anemia [D64.9]  Yes    ANNA (acute kidney injury) [N17.9]  Yes    Chronic cough [R05]  Yes    Pulmonary nodules [R91.8]  Yes    Anemia [D64.9]  Yes    Anxiety [F41.9]  Yes    HTN (hypertension) [I10]  Yes     Chronic      Resolved Hospital Problems    Diagnosis Date Resolved POA    Asymptomatic bacteriuria [R82.71] 02/27/2018 Yes    Shortness of breath [R06.02] 02/27/2018 Yes    Mild malnutrition [E44.1] 02/27/2018 Yes       Future Appointments  Date Time Provider Department Center   3/15/2018 10:00 AM Bob Borrego MD [facility] BM RIOS Con[facility] Cance   3/22/2018 8:00 AM SUDS, UROLOGY [facility] UROLOGY Nam Berger   3/29/2018 2:00 PM Lisandro Bettencourt MD [facility] GASTRO Nam ismael     Follow-up Information     Deric Claudio MD In 1 week.    Specialty:  Family Medicine  Contact information:  [address] [facility]  [place] [address]  [phone]             Kinza Herrera NP On 2/26/2018.    Specialties:  Pulmonary Disease, Critical Care Medicine  Why:  appointment 09:00 in pulmonary  clinic  Contact information:  [address]  [facility] [address]  [phone]             Deric Melton MD On 2/27/2018.    Specialty:  Hematology and Oncology  Why:  appointment 1:00pm  Contact information:  [address] [place] [address]  [phone]                     I have seen and examined this patient face to face today. My clinical findings that support the need for the home health skilled services and home bound status are the following:  Medical restrictions requiring assistance of another human to leave home due to  IV infusion Needs.    Allergies:  Review of patient's allergies indicates:   Allergen Reactions    Augmentin [amoxicillin-pot clavulanate]      Patient felt that it raised BP and requested to have it added as intolerance. Tolerated unasyn    Ciprofloxacin     Flagyl [metronidazole]     Lisinopril Other (See Comments)     cough    Mysoline [primidone]     Omnicef [cefdinir]     Shellfish containing products        Diet: cardiac diet - Fluid - 1200mL fluid restriction    Activities: activity as tolerated    Nursing:   SN to complete comprehensive assessment including routine vital signs. Instruct on disease process and s/s of complications to report to MD. Review/verify medication list sent home with the patient at time of discharge  and instruct patient/caregiver as needed. Frequency may be adjusted depending on start of care date.    Notify MD if SBP > 160 or < 90; DBP > 90 or < 50; HR > 120 or < 50; Temp > 101;       CONSULTS:    Physical Therapy to evaluate and treat. Evaluate for home safety and equipment needs; Establish/upgrade home exercise program. Perform / instruct on therapeutic exercises, gait training, transfer training, and Range of Motion.  Occupational Therapy to evaluate and treat. Evaluate home environment for safety and equipment needs. Perform/Instruct on transfers, ADL training, ROM, and therapeutic exercises.  Aide to provide assistance with  personal care, ADLs, and vital signs.    MISCELLANEOUS CARE:  Home Infusion Therapy:   SN to perform Infusion Therapy/Central Line Care.  Review Central Line Care & Central Line Flush with patient.    Administer (drug and dose): AMPICILLIN SODIUM (AMPICILLIN 2 G/100 ML NS, READY TO MIX SYSTEM,)  Inject 100 mLs (2 g total) into the vein every 4 (four) hours. (End date 4/14/18)  Last dose given: 3/6/18   at 4.15pm                   Home dose due: 8.15pm    Administer (drug and dose):cefTRIAXone 2 g in dextrose 5 % 50 mL (ROCEPHIN) 2 g/50 mL PgBk IVPB  Inject 50 mLs (2 g total) into the vein every 12 (twelve) hours.(End date 4/14/18)  Last dose given: 3/6/18   at 2.45PM                   Home dose due: 3/7/18 2AM    Scrub the Hub: Prior to accessing the line, always perform a 30 second alcohol scrub  Each lumen of the central line is to be flushed at least daily with 10 mL Normal Saline and 3 mL Heparin flush (100 units/mL)  Skilled Nurse (SN) may draw blood from IV access  Blood Draw Procedure:   - Aspirate at least 5 mL of blood   - Discard   - Obtain specimen   - Change posiflow cap   - Flush with 20 mL Normal Saline followed by a                 3-5 mL Heparin flush (100 units/mL)  Central :   - Sterile dressing changes are done weekly and as needed.   - Use chlor-hexadine scrub to cleanse site, apply Biopatch to insertion site,       apply securement device dressing   - Posi-flow caps are changed weekly and after EVERY lab draw.   - If sterile gauze is under dressing to control oozing,                 dressing change must be performed every 24 hours until gauze is not needed.    WOUND CARE ORDERS  n/a     Labs  cbc and cmp while on meds  please fax results to ID clinic    Medications: Review discharge medications with patient and family and provide education.       Rodolfo Messina   Home Medication Instructions JASMIN:[id number]    Printed on:03/06/18 5045   Medication Information                       amLODIPine (NORVASC) 10 MG tablet  Take 1 tablet (10 mg total) by mouth once daily.             AMPICILLIN SODIUM (AMPICILLIN 2 G/100 ML NS, READY TO MIX SYSTEM,)  Inject 100 mLs (2 g total) into the vein every 4 (four) hours. (End date 4/14/18)             benzonatate (TESSALON) 100 MG capsule  Take 1 capsule (100 mg total) by mouth 3 (three) times daily as needed for Cough.             cefTRIAXone 2 g in dextrose 5 % 50 mL (ROCEPHIN) 2 g/50 mL PgBk IVPB  Inject 50 mLs (2 g total) into the vein every 12 (twelve) hours.(End date 4/14/18)             CHERATUSSIN AC  mg/5 mL syrup  Take 10 mLs by mouth every 6 (six) hours as needed.              fluticasone (FLONASE) 50 mcg/actuation nasal spray  1 spray by Each Nare route 2 (two) times daily.             furosemide (LASIX) 40 MG tablet  Take 1 tablet (40 mg total) by mouth once daily.             loratadine (CLARITIN) 10 mg tablet  Take 10 mg by mouth once daily.             mirtazapine (REMERON) 30 MG tablet  TAKE 1 TABLET EVERY EVENING             ramelteon (ROZEREM) 8 mg tablet  Take 1 tablet (8 mg total) by mouth nightly as needed for Insomnia.             senna-docusate 8.6-50 mg (PERICOLACE) 8.6-50 mg per tablet  Take 2 tablets by mouth once daily.             tamsulosin (FLOMAX) 0.4 mg Cp24  Take 2 capsules (0.8 mg total) by mouth BID             VENTOLIN HFA 90 mcg/actuation inhaler                   I certify that this patient is confined to his home and needs intermittent skilled nursing care, physical therapy and occupational therapy.

## 2018-03-06 NOTE — SUBJECTIVE & OBJECTIVE
Interval History:   No acute events  No difficulty voiding  String of bottles showed clearing of hematuria    Review of Systems  Objective:     Temp:  [98 °F (36.7 °C)-99.2 °F (37.3 °C)] 99 °F (37.2 °C)  Pulse:  [87-99] 87  Resp:  [14-18] 18  SpO2:  [90 %-98 %] 91 %  BP: (121-128)/(57-82) 123/61     Body mass index is 24.74 kg/m².       Bladder Scan Volume (mL): 320 mL (03/04/18 1200)    Drains          No matching active lines, drains, or airways          Physical Exam   Nursing note and vitals reviewed.  Constitutional: He is oriented to person, place, and time. He appears well-developed and well-nourished. No distress.   HENT:   Head: Normocephalic and atraumatic.   Eyes: EOM are normal. Right conjunctiva has a hemorrhage. No scleral icterus.   Neck: Normal range of motion. Neck supple.   Cardiovascular: Normal rate and intact distal pulses.    Pulmonary/Chest: Effort normal. No respiratory distress.   Abdominal: Soft. He exhibits no distension. There is no tenderness.   No suprapubic tenderness   Genitourinary: Rectum normal and penis normal.   Musculoskeletal: He exhibits no edema or deformity.   Neurological: He is alert and oriented to person, place, and time.   Skin: Skin is warm and dry.     Psychiatric: He has a normal mood and affect. His behavior is normal. Thought content normal.     Significant Labs:    BMP:    Recent Labs  Lab 03/04/18  0757 03/04/18 2010 03/05/18  0752    141 141   K 3.9 3.9 3.7    106 108   CO2 26 24 23   BUN 41* 45* 39*   CREATININE 1.3 1.6* 1.4   CALCIUM 9.2 9.0 8.8       CBC:     Recent Labs  Lab 03/04/18  0344 03/05/18  0410 03/06/18  0459   WBC 8.12 7.80 7.53   HGB 6.6* 6.4* 8.0*   HCT 20.7* 19.9* 24.6*    169 195       Urine Studies:   Recent Labs  Lab 03/02/18 2125   COLORU Red*   APPEARANCEUA Cloudy*   PHUR 7.0   SPECGRAV 1.010   PROTEINUA 2+*   GLUCUA Negative   KETONESU Negative   BILIRUBINUA Negative   OCCULTUA 3+*   NITRITE Negative   UROBILINOGEN  Negative   LEUKOCYTESUR Negative   RBCUA >100*   WBCUA 0   BACTERIA None   SQUAMEPITHEL 0   HYALINECASTS 0       Significant Imaging:  All pertinent imaging results/findings from the past 24 hours have been reviewed.

## 2018-03-06 NOTE — PLAN OF CARE
Problem: Physical Therapy Goal  Goal: Physical Therapy Goal  Goals to be met by: 10 days (3/6)    Patient will increase functional independence with mobility by performin. Sit to stand transfer with Modified [place]-Met  2. Gait  x 200 feet with Modified [place] using LRAD-met  3. Lower extremity exercise program x15 reps in standing, with independence to improve balance, muscular strength and endurance w/ assistance as needed. -Met          Outcome: Outcome(s) achieved Date Met: 18   Patient has met PT goals, appropriate for dc from inpatient services w/ HHPT followup    Luís Rocha, PT  3/6/2018

## 2018-03-06 NOTE — DISCHARGE SUMMARY
"[facility] Hospital Medicine  Discharge Summary      Patient Name: Rodolfo Messina  MRN: [id number]  Admission Date: 2/21/2018  Hospital Length of Stay: 13 days  Discharge Date and Time:  03/06/2018 10:48 AM  Attending Physician: Babak Magallanes MD   Discharging Provider: Babak Magallanes MD  Primary Care Provider: Deric Claudio MD    Hospital Medicine Team: [facility]OS[facility] MED B Babak Magallanes MD    HPI: Patient is a 67 y.o. male with significant past medical history of HTN and essential tremor, recently with urinary retention requiring indwelling lackey presented to hospital complaining of low Hgb. The patient was recently admitted 1/27/18 - 01/28/18 for symptomatic anemia associated with fatigue. He received one unit blood transfusion with improved Hgb from 7 to 8.5. Follow up EGD and colonoscopy on 02/01/18 showed non bleeding erosive gastropathy, internal hemorrhoid and diverticulosis in sigmoid and descending colon. The patient had routine labs done by his PCP which revealed hgb 7.1. The patient's PCP called him and told him to report to the Emergency Department for further workup.      The patient endorses a non-productive cough since December, with progression to green productive sputum x3 days and scant blood tinged today. Prior to this hospitalization the patient reports a few day history of fever/chills and malaise. He currently endorses a "grabbing" feeling in his chest when he inhales which is only relieved with coughing as well as abdominal musculature pain associated with coughing.      Work up in the ED revealed a hgb of 6.7, creatinine 2 (baseline ~ 1.1). UA was negative for infection. Stool was neg for occult blood.  The patient was admitted to Hospital Medicine for further management. CTA chest revealed multiple pulmonary nodules for which Pulmonology was consulted (patient has occupational asbestos exposure) > recommended adding duo nebs and outpatient follow up). The " patient has received 2 units PRBCs during this hospitalization, the last being ~ 2am on 2/24. The patient developed hypoxemia overnight 2/23 into 2/24 (87% on RA) and required 2-3L NC. CXRY on 2/24 revealed interval development of bilateral patchy interstitial and parietal attenuation reflective of edema vs infection. The patient was given a x1 dose of lasix 20mg and maintenance IVF were discontinued. On the evening of 2/24 the patient developed worsening infiltrates and increasing oxygen requirements, now needing comfort flow 30L 80% to maintain oxygen levels. Critical Care Medicine was consulted for hypoxemic respiratory failure.      Upon initial evaluation the patient had decreasing oxygen requirements, subjectively felt better and was deemed stable for the floor. Upon re-eval, oxygen requirements had increased back to 30L 70% and it was decided to move the patient to the ICU for closer monitoring.    Procedure(s) (LRB):  TRANSESOPHAGEAL ECHOCARDIOGRAM (SOWMYA) (N/A)      Hospital Course:     Mr. Messina was admitted to the ICU with acute hypoxemic respiratory failure requiring comfort flow.  Antibiotics were broadened to (vancomycin + unasyn + azithromycin). He was also given lasix for diuresis with good response.  Oxygen requirements are slowing decreasing and creatinine is improving. Vasculitis workup pending given concern for DAH with worsening anemia without signs of acute blood loss other than scant hemoptysis. 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 CTS and ID consulted. One blood culture from 2/25 resulted with GPC's.       Acute hypoxemic respiratory failure     --2/2 cardiogenic pulmonary edema given endocarditis with moderate/severe MR and AI in the setting of having received >3 L crystalloids, 2 units PRBC's during first days of admission.  negative balance of 16l since admit  --concern for possible DAH  previously given concurrent anemia with acute worsening hypoxemia/bilateral infiltrates. However, now with scant amount of hemoptysis. MAGNO negative. negative ANCA, glomerular basement membrane antibody             Pulmonary nodules     --unclear etiology; inflammation, infection, vs other  --non-smoker, h/o occupational asbestos exposure  --repeat CT in 3-6 months                   r   HTN (hypertension)     --continue home amlodipine          Acute cardiogenic pulmonary edema     --2/2 MR and AI. See above          Subacute infective endocarditis     --appreciate cardiology's and CTS assistance. See above for plan    s/p CT surgery evaluation.Continue antibiotic therapy as per the recommendation of ID service. Pt should follow up with CT surgery in 4 weeks with a repeat echo       Aortic valve endocarditis     --noted on SOWMYA 2/27. Along with mitral valve endocarditis and associated AI, MR.  - blood cultures from 2/27 with enterocci.  Appreciate ID assistance. sis  --CTS consulted and recommending finishing Abx therapy, optimize volume status and follow up with CTS when stable.   --unclear initial source of infection   BC with enterococcus Faecalis, will need 6 weeks total of amp and rocephin from 3/1 ( BC negative on 3/1)   PICC  placed 3/5  Continue ampicillin 12g/day iv divided q4hr (or continuous) + ceftriaxone 2g iv q12hr for 6 weeks  - F/u with ID in clinic in 4 weeks          Mitral and aortic valve regurgitation     --2/2 endocarditis. See below  --optimize volume status as tolerated                  ANNA (acute kidney injury)     --previously attributed to bactrim (started for proteus UTI), now d/c'd; baseline creatinine ~ 1.1  --Renal US with evidence of medical renal disease  --creatinine slowly improving with diuresis. Continue to trend                  Anemia     --no evidence of ongoing GIB, previous EGD and C scope without source of bleeding. Minimal amounts of hemoptysis currently.   --hemolytic labs  negative. Inappropriately low reticulocytes  --s/p 2 units this admission (on 2/22 and 2/23). and 3/5 - 2units. HB stable at 8.2 Continue to monitor and transfuse for Hgb <7. oncoloygy for marrow examination and GI for possible capsule endocopy        Hematuria   resolved  now. Unclear etiology at this time. Possibly due to traumatic lackey / indwelling lackey irritation. Need hematuria workup.  Plan to undergo cystoscopy with SUDS scheduled 3/22 and can repeat upper tract imaging at that time.  String of bottles showed improved hematuria. Patient will need to come to the ED if he has severe difficulty voiding.   Patient is asymptomatic from a urinary retention standpoint despite PVRs 300-500mL.  Creatinine has been stable.  Would restart flomax 0.8mg (BID dosing)  Will follow up outpatient for SUDS 3/22 to evaluate bladder emptying     Being discharged with urology, oncology, GI followup           Consults:   Consults         Status Ordering Provider     Inpatient consult to Cardiology  Once     Provider:  (Not yet assigned)    Completed ANETTE LYNNE     Inpatient consult to Cardiothoracic Surgery  Once     Provider:  (Not yet assigned)    Completed DEBBY KAUFMAN     Inpatient consult to Critical Care Medicine  Once     Provider:  (Not yet assigned)    Completed KELSEA PETERSEN     Inpatient consult to Gastroenterology  Once     Provider:  (Not yet assigned)    Completed MAX BRAN     Inpatient consult to Hematology/Oncology  Once     Provider:  (Not yet assigned)    Completed MAX BRAN     Inpatient consult to Infectious Diseases  Once     Provider:  (Not yet assigned)    Completed MAX BRAN     Inpatient consult to Infectious Diseases  Once     Provider:  (Not yet assigned)    Completed DEBBY KAUFMAN     Inpatient consult to PICC team ([facility])  Once     Provider:  (Not yet assigned)    Completed KATIE COLON     Inpatient consult to Pulmonology  Once     Provider:  (Not yet  assigned)    Completed MAX BRAN     Inpatient consult to Registered Dietitian/Nutritionist  Once     Provider:  (Not yet assigned)    Completed MAX BRAN     Inpatient consult to Urology  Once     Provider:  (Not yet assigned)    Completed KATIE COLON          Final Active Diagnoses:    Diagnosis Date Noted POA    PRINCIPAL PROBLEM:  Acute hypoxemic respiratory failure [J96.01] 02/24/2018 Yes    Hematuria, unspecified [R31.9] 03/05/2018 Unknown    Urinary retention with incomplete bladder emptying [R33.9] 03/05/2018 Unknown    Aortic valve endocarditis [I35.8] 02/28/2018 Yes    Endocarditis of mitral valve [I05.8] 02/28/2018 Yes    Mitral and aortic valve regurgitation [I08.0] 02/27/2018 Yes    Pulmonary hypertension due to mitral valve disease [I27.22, I05.9] 02/27/2018 Yes    Subacute bacterial endocarditis [I33.0] 02/27/2018 Yes    Acute cardiogenic pulmonary edema [I50.1] 02/27/2018 Yes    Endocarditis [I38] 02/27/2018 Yes    Subacute infective endocarditis [I33.0] 02/26/2018 No    Symptomatic anemia [D64.9] 02/22/2018 Yes    ANNA (acute kidney injury) [N17.9] 02/22/2018 Yes    Chronic cough [R05] 02/22/2018 Yes    Pulmonary nodules [R91.8] 02/22/2018 Yes    Anemia [D64.9] 01/27/2018 Yes    Anxiety [F41.9] 12/17/2015 Yes    HTN (hypertension) [I10] 11/09/2014 Yes     Chronic      Problems Resolved During this Admission:    Diagnosis Date Noted Date Resolved POA    Asymptomatic bacteriuria [R82.71] 02/24/2018 02/27/2018 Yes    Shortness of breath [R06.02] 02/24/2018 02/27/2018 Yes    Mild malnutrition [E44.1] 02/22/2018 02/27/2018 Yes      Discharged Condition: fair    Disposition: home    Follow Up:  Follow-up Information     Deric Claudio MD In 1 week.    Specialty:  Family Medicine  Contact information:  [address] [place] [address] [id number] [phone]             Kinza Herrera NP On 2/26/2018.    Specialties:  Pulmonary Disease, Critical Care Medicine  Why:   appointment 09:00 in pulmonary clinic  Contact information:  [address]  [facility] [address]  [phone]             Deric Melton MD On 2/27/2018.    Specialty:  Hematology and Oncology  Why:  appointment 1:00pm  Contact information:  [address]  [facility] [address]  [phone]                 Patient Instructions:     Ambulatory Referral to Hematology / Oncology   Referral Priority: Routine Referral Type: Consultation   Referral Reason: Specialty Services Required    Requested Specialty: Hematology and Oncology    Number of Visits Requested: 1      Ambulatory Referral to Pulmonology   Referral Priority: Routine Referral Type: Consultation   Referral Reason: Specialty Services Required    Requested Specialty: Pulmonary Disease    Number of Visits Requested: 1      Ambulatory Referral to Gastroenterology   Referral Priority: Routine Referral Type: Consultation   Referral Reason: Specialty Services Required    Requested Specialty: Gastroenterology    Number of Visits Requested: 1        Medications:  Reconciled Home Medications:   Current Discharge Medication List      START taking these medications    Details   albuterol-ipratropium 2.5mg-0.5mg/3mL (DUO-NEB) 0.5 mg-3 mg(2.5 mg base)/3 mL nebulizer solution Take 3 mLs by nebulization every 6 (six) hours as needed for Wheezing or Shortness of Breath. Rescue  Qty: 1 Box, Refills: 0      AMPICILLIN SODIUM (AMPICILLIN 2 G/100 ML NS, READY TO MIX SYSTEM,) Inject 100 mLs (2 g total) into the vein every 4 (four) hours.  Qty: 34211 mL, Refills: 1      benzonatate (TESSALON) 100 MG capsule Take 1 capsule (100 mg total) by mouth 3 (three) times daily as needed for Cough.  Qty: 90 capsule, Refills: 2      cefTRIAXone 2 g in dextrose 5 % 50 mL (ROCEPHIN) 2 g/50 mL PgBk IVPB Inject 50 mLs (2 g total) into the vein every 12 (twelve) hours.  Qty: 60 each, Refills: 1      furosemide (LASIX) 40 MG tablet Take 1 tablet (40 mg total) by mouth once  daily.  Qty: 30 tablet, Refills: 1      ramelteon (ROZEREM) 8 mg tablet Take 1 tablet (8 mg total) by mouth nightly as needed for Insomnia.  Qty: 30 tablet, Refills: 1      senna-docusate 8.6-50 mg (PERICOLACE) 8.6-50 mg per tablet Take 2 tablets by mouth once daily.         CONTINUE these medications which have CHANGED    Details   amLODIPine (NORVASC) 10 MG tablet Take 1 tablet (10 mg total) by mouth once daily.  Qty: 30 tablet, Refills: 1    Associated Diagnoses: Essential hypertension      CHERATUSSIN AC  mg/5 mL syrup Take 10 mLs by mouth every 6 (six) hours as needed.  Qty: 1 Bottle, Refills: 0      fluticasone (FLONASE) 50 mcg/actuation nasal spray 1 spray (50 mcg total) by Each Nare route 2 (two) times daily.  Qty: 1 Bottle, Refills: 11    Associated Diagnoses: Upper respiratory tract infection, unspecified type      tamsulosin (FLOMAX) 0.4 mg Cp24 Take 2 capsules (0.8 mg total) by mouth 2 (two) times daily.  Qty: 120 capsule, Refills: 11         CONTINUE these medications which have NOT CHANGED    Details   loratadine (CLARITIN) 10 mg tablet Take 10 mg by mouth once daily.      VENTOLIN HFA 90 mcg/actuation inhaler       mirtazapine (REMERON) 30 MG tablet TAKE 1 TABLET EVERY EVENING  Qty: 90 tablet, Refills: 3         STOP taking these medications       ALPRAZolam (XANAX) 0.25 MG tablet Comments:   Reason for Stopping:         ketorolac (TORADOL) 10 mg tablet Comments:   Reason for Stopping:         sulfamethoxazole-trimethoprim 800-160mg (BACTRIM DS) 800-160 mg Tab Comments:   Reason for Stopping:         brompheniramine-pseudoeph-DM 2-30-10 mg/5 mL Syrp Comments:   Reason for Stopping:         traMADol (ULTRAM) 50 mg tablet Comments:   Reason for Stopping:               Significant Diagnostic Studies: Labs:   BMP:   Recent Labs  Lab 03/04/18 2010 03/05/18  0410 03/05/18  0752 03/06/18  0459     --  99  --      --  141  --    K 3.9  --  3.7  --      --  108  --    CO2 24  --  23   --    BUN 45*  --  39*  --    CREATININE 1.6*  --  1.4  --    CALCIUM 9.0  --  8.8  --    MG  --  1.9  --  1.9   , CMP   Recent Labs  Lab 03/04/18 2010 03/05/18 0410 03/05/18 0752 03/05/18 1955 03/06/18  0459     --  141  --   --    K 3.9  --  3.7  --   --      --  108  --   --    CO2 24  --  23  --   --      --  99  --   --    BUN 45*  --  39*  --   --    CREATININE 1.6*  --  1.4  --   --    CALCIUM 9.0  --  8.8  --   --    PROT  --  5.8*  --  6.0 6.1   ALBUMIN 2.6* 2.3* 2.5* 2.3* 2.4*   BILITOT  --  0.5  --  1.1* 0.6   ALKPHOS  --  62  --  64 65   AST  --  43*  --  42* 37   ALT  --  53*  --  52* 51*   ANIONGAP 11  --  10  --   --    ESTGFRAFRICA 50.8*  --  59.7*  --   --    EGFRNONAA 43.9*  --  51.6*  --   --    , CBC   Recent Labs  Lab 03/05/18 0410 03/06/18 0459   WBC 7.80 7.53   HGB 6.4* 8.0*   HCT 19.9* 24.6*    195   , INR   Lab Results   Component Value Date    INR 1.1 02/21/2018    INR 1.1 01/27/2018   , Lipid Panel   Lab Results   Component Value Date    CHOL 161 08/09/2017    HDL 44 08/09/2017    LDLCALC 94 08/09/2017    TRIG 115 08/09/2017    CHOLHDL 3.7 08/09/2017   , Troponin No results for input(s): TROPONINI in the last 168 hours., A1C:   Recent Labs  Lab 01/27/18 2003 02/22/18  0615   HGBA1C 5.4 5.4    and All labs within the past 24 hours have been reviewed  Microbiology:   Blood Culture   Lab Results   Component Value Date    LABBLOO No Growth to date 03/01/2018    LABBLOO No Growth to date 03/01/2018    LABBLOO No Growth to date 03/01/2018    LABBLOO No Growth to date 03/01/2018    LABBLOO No Growth to date 03/01/2018   , Sputum Culture   Lab Results   Component Value Date    GSRESP  02/25/2018     Predominance of oropharyngeal wendie. Please recollect.    RESPIRATORYC Specimen inadequate - culture not performed 02/25/2018    and Urine Culture    Lab Results   Component Value Date    LABURIN No growth 03/02/2018     Radiology:   Imaging Results          X-Ray Chest  PA And Lateral (Final result)  Result time 02/21/18 21:59:42    Final result by Pradeep Peralta MD (02/21/18 21:59:42)                 Impression:     No acute cardiopulmonary abnormality.      Electronically signed by: Pradeep Peralta  Date:     02/21/18  Time:    21:59              Narrative:    EXAM:  2 VIEW CHEST RADIOGRAPH.     CLINICAL INDICATION:  Cough.    COMPARISON: 01/27/2018.    TECHNIQUE:  Two views of the chest were obtained.     FINDINGS: Cardiac silhouette is not significantly enlarged and stable in size.  Minimal linear subsegmental atelectasis at the lung bases. No focal consolidation.  No pleural effusion or pneumothorax.  No acute bony abnormality is identified.                            Cardiac Graphics  Sinus tachycardia @ 118bpm     Pending Diagnostic Studies:     Procedure Component Value Units Date/Time    Renal function panel [[id number]]     Order Status:  Sent Lab Status:  No result     Specimen:  Blood from Blood     Renal function panel [[id number]] Collected:  02/28/18 1722    Order Status:  Sent Lab Status:  In process Updated:  02/28/18 1723    Specimen:  Blood from Blood     T-SPOT TB Screening Test [[id number]] Collected:  03/06/18 0800    Order Status:  Sent Lab Status:  In process Updated:  03/06/18 0810    Specimen:  Blood from Blood         Indwelling Lines/Drains at time of discharge:   Lines/Drains/Airways     Peripherally Inserted Central Catheter Line                 PICC Double Lumen 03/05/18 1539 right basilic less than 1 day                    Babak Magallanes MD  Department of Hospital Medicine  [facility]

## 2018-03-06 NOTE — PROGRESS NOTES
[facility]  Urology  Progress Note    Patient Name: Rodolfo Messina  MRN: [id number]  Admission Date: 2/21/2018  Hospital Length of Stay: 13 days  Code Status: Full Code   Attending Provider: John Agudelo MD   Primary Care Physician: Deric Claudio MD    Subjective:     HPI:  Rodolfo Messina is a 67 y.o. male with HTN and chronic anemia, admitted for cough with progressive respiratory failure and hypoxemia ultimately diagnosed with endocarditis with blood cultures positive for enterococcus faecalis currently on rocephin and amp. Urology was consulted due to urinary retention and hematuria.    He was seen in urology clinic in January and February earlier this year for elevated PVR to about 300 mL in the ED when being evaluated for back pain. He was started on flomax but ultimately managed with an indwelling lackey catheter with plans for follow up for outpatient SUDS/Cysto. He was unable to get this done due to this current admission. He had a UTI positive for Proteus diagnosed during catheter exchange which was treated with Bactrim. He had an renal US on 2/23 which showed medical renal disease. His creatinine has been stable, most recent was 1.4 which is near baseline. His lackey was removed on 3/2 and the patient has been able to void without intermittent caths. He has developed some small clots and dark urine but has noted the color to be improving over the last 3 days. He has not had to strain to void or had suprapubic pain. Bladder scan today showed 520 after voiding 325, but denied symptoms of having to void. He c/o nocturia x1, minimal daytime frequency, good FOS, and denies sensation of incomplete bladder emptying, hesitancy, or intermittency.    Interval History:   No acute events  No difficulty voiding  String of bottles showed clearing of hematuria    Review of Systems  Objective:     Temp:  [98 °F (36.7 °C)-99.2 °F (37.3 °C)] 99 °F (37.2 °C)  Pulse:  [87-99] 87  Resp:  [14-18]  18  SpO2:  [90 %-98 %] 91 %  BP: (121-128)/(57-82) 123/61     Body mass index is 24.74 kg/m².       Bladder Scan Volume (mL): 320 mL (03/04/18 1200)    Drains          No matching active lines, drains, or airways          Physical Exam   Nursing note and vitals reviewed.  Constitutional: He is oriented to person, place, and time. He appears well-developed and well-nourished. No distress.   HENT:   Head: Normocephalic and atraumatic.   Eyes: EOM are normal. Right conjunctiva has a hemorrhage. No scleral icterus.   Neck: Normal range of motion. Neck supple.   Cardiovascular: Normal rate and intact distal pulses.    Pulmonary/Chest: Effort normal. No respiratory distress.   Abdominal: Soft. He exhibits no distension. There is no tenderness.   No suprapubic tenderness   Genitourinary: Rectum normal and penis normal.   Musculoskeletal: He exhibits no edema or deformity.   Neurological: He is alert and oriented to person, place, and time.   Skin: Skin is warm and dry.     Psychiatric: He has a normal mood and affect. His behavior is normal. Thought content normal.     Significant Labs:    BMP:    Recent Labs  Lab 03/04/18  0757 03/04/18 2010 03/05/18  0752    141 141   K 3.9 3.9 3.7    106 108   CO2 26 24 23   BUN 41* 45* 39*   CREATININE 1.3 1.6* 1.4   CALCIUM 9.2 9.0 8.8       CBC:     Recent Labs  Lab 03/04/18  0344 03/05/18  0410 03/06/18  0459   WBC 8.12 7.80 7.53   HGB 6.6* 6.4* 8.0*   HCT 20.7* 19.9* 24.6*    169 195       Urine Studies:   Recent Labs  Lab 03/02/18  2125   COLORU Red*   APPEARANCEUA Cloudy*   PHUR 7.0   SPECGRAV 1.010   PROTEINUA 2+*   GLUCUA Negative   KETONESU Negative   BILIRUBINUA Negative   OCCULTUA 3+*   NITRITE Negative   UROBILINOGEN Negative   LEUKOCYTESUR Negative   RBCUA >100*   WBCUA 0   BACTERIA None   SQUAMEPITHEL 0   HYALINECASTS 0       Significant Imaging:  All pertinent imaging results/findings from the past 24 hours have been  reviewed.                  Assessment/Plan:     Hematuria, unspecified    Unclear etiology at this time. Possibly due to traumatic lackey / indwelling lackey irritation. Need hematuria workup.  Plan to undergo cystoscopy with SUDS scheduled 3/22 and can repeat upper tract imaging at that time.  String of bottles showed improved hematuria. Patient will need to come to the ED if he has severe difficulty voiding.        Urinary retention with incomplete bladder emptying    Patient is asymptomatic from a urinary retention standpoint despite PVRs 300-500mL.  Creatinine has been stable.  Would restart flomax 0.8mg (BID dosing)  Will follow up outpatient for SUDS 3/22 to evaluate bladder emptying          We will sign off at this time. Please call with questions or issues. Thanks.      VTE Risk Mitigation         Ordered     Medium Risk of VTE  Once      02/22/18 0611     Place sequential compression device  Until discontinued      02/22/18 0343     Place MOHAN hose  Until discontinued      02/22/18 0343          Leo Salas MD  Urology  [facility]

## 2018-03-06 NOTE — PLAN OF CARE
Discharge planning: Spoke with Asiya from [facility] to inform her that patient will be potentially discharged home today after CBC has resulted and reviewed by Dr. Magallanes. She states that teaching has been completed with spouse and insurance approval has been received. They will check chart for d/c order this evening and connect patient to infusion prior to discharge. Home health to be set up by [facility].    Future Appointments  Date Time Provider Department Center   3/15/2018 10:00 AM Bob Borrego MD [facility] BM RIOS Jarvis Newman   3/22/2018 8:00 AM VERNAS, UROLOGY [facility] UROLOGY Nam Berger   3/27/2018 10:00 AM Leo Diaz MD [facility] ID Nam Berger   3/29/2018 2:00 PM Lisandro Bettencourt MD [facility] GASTRO Nam ismael

## 2018-03-06 NOTE — PT/OT/SLP PROGRESS
Physical Therapy Treatment/DC Summary    Patient Name:  Rodolfo Messina   MRN:  [id number]    Recommendations:     Discharge Recommendations:  home with home health   Discharge Equipment Recommendations: cane, gretel   Barriers to discharge: None    Assessment:     Rodolfo Messina is a 67 y.o. male admitted with a medical diagnosis of Acute hypoxemic respiratory failure.  He presents with the following impairments/functional limitations:  impaired endurance, impaired self care skills, impaired functional mobilty, gait instability. Patient has made significant progress towards therapy goals. Patient has proven ability to self management and is ok to d/c from inpatient services w/ HHPT followup.    Rehab Prognosis:  good; patient would benefit from acute skilled PT services to address these deficits and reach maximum level of function.      Recent Surgery: Procedure(s) (LRB):  TRANSESOPHAGEAL ECHOCARDIOGRAM (SOWMYA) (N/A) 7 Days Post-Op    Plan:     During this hospitalization, patient to be seen 3 x/week to address the above listed problems via therapeutic activities, therapeutic exercises, neuromuscular re-education  · Plan of Care Expires:  04/01/18   Plan of Care Reviewed with: patient, spouse    Subjective     Communicated with nurse prior to session.  Patient found standing in bathroom upon PT entry to room, agreeable to treatment.      Chief Complaint: return to plof  Patient comments/goals: to get better  Pain/Comfort:  · Pain Rating 1: 0/10    Patients cultural, spiritual, [employer] conflicts given the current situation: none stated    Objective:     Patient found with:  (all lines intact)     General Precautions: Standard, fall   Orthopedic Precautions:N/A   Braces: N/A     Functional Mobility:  · Transfers:  Sit to Stand:  supervision with no AD  · Gait: 300'  · SBA w/ no AD      AM-PAC 6 CLICK MOBILITY  Turning over in bed (including adjusting bedclothes, sheets and blankets)?: 4  Sitting down on and  standing up from a chair with arms (e.g., wheelchair, bedside commode, etc.): 3  Moving from lying on back to sitting on the side of the bed?: 3  Moving to and from a bed to a chair (including a wheelchair)?: 3  Need to walk in hospital room?: 3  Climbing 3-5 steps with a railing?: 3  Total Score: 19       Therapeutic Activities and Exercises:   Patient assisted w/ vincent mobility and balance assessment, he's safe.    Patient left standing in room with all lines intact, call button in reach and nurse present..    GOALS:    Physical Therapy Goals     Not on file          Multidisciplinary Problems (Resolved)        Problem: Physical Therapy Goal    Goal Priority Disciplines Outcome Goal Variances Interventions   Physical Therapy Goal   (Resolved)     PT/OT, PT Outcome(s) achieved     Description:  Goals to be met by: 10 days (3/6)    Patient will increase functional independence with mobility by performin. Sit to stand transfer with Modified [place]-Met  2. Gait  x 200 feet with Modified [place] using LRAD-met  3. Lower extremity exercise program x15 reps in standing, with independence to improve balance, muscular strength and endurance w/ assistance as needed. -Met                            Time Tracking:     PT Received On: 18  PT Start Time: 735     PT Stop Time: 745  PT Total Time (min): 10 min     Billable Minutes: Gait Training 10 Min    Treatment Type: Treatment  PT/PTA: PT           Luís Rocha, PT  2018

## 2018-03-06 NOTE — PLAN OF CARE
Problem: Fall Risk (Adult)  Goal: Absence of Falls  Patient will demonstrate the desired outcomes by discharge/transition of care.   Outcome: Ongoing (interventions implemented as appropriate)   03/05/18 1826   Fall Risk (Adult)   Absence of Falls making progress toward outcome       Problem: Patient Care Overview  Goal: Plan of Care Review  Outcome: Ongoing (interventions implemented as appropriate)   03/05/18 1826   Coping/Psychosocial   Plan Of Care Reviewed With patient;spouse       Problem: Anemia (Adult)  Goal: Identify Related Risk Factors and Signs and Symptoms  Related risk factors and signs and symptoms are identified upon initiation of Human Response Clinical Practice Guideline (CPG)   Outcome: Ongoing (interventions implemented as appropriate)   03/05/18 1826   Anemia   Related Risk Factors (Anemia) bleeding;chronic illness   Signs and Symptoms (Anemia) bruising;dizziness;arrhythmia;dyspnea;fatigue

## 2018-03-06 NOTE — ASSESSMENT & PLAN NOTE
Patient is asymptomatic from a urinary retention standpoint despite PVRs 300-500mL.  Creatinine has been stable.  Would restart flomax 0.8mg (BID dosing)  Will follow up outpatient for SUDS 3/22 to evaluate bladder emptying

## 2018-03-06 NOTE — PLAN OF CARE
Problem: Occupational Therapy Goal  Goal: Occupational Therapy Goal  Goals to be met by: 3/10/2018    Patient will increase functional independence with ADLs by performing:    UE Dressing with Modified [place].  LE Dressing with Modified [place].  Grooming while standing with Modified [place].  Toileting from toilet with Modified [place] for hygiene and clothing management.   Bathing from  edge of bed with Modified [place].     Outcome: Outcome(s) achieved Date Met: 03/06/18  Pt has met the goals above. OT to d/c.     MILTON Foster  3/6/2018  Pager: [phone]

## 2018-03-06 NOTE — ASSESSMENT & PLAN NOTE
Unclear etiology at this time. Possibly due to traumatic lackey / indwelling lackey irritation. Need hematuria workup.  Plan to undergo cystoscopy with SUDS scheduled 3/22 and can repeat upper tract imaging at that time.  String of bottles showed improved hematuria. Patient will need to come to the ED if he has severe difficulty voiding.

## 2018-03-07 ENCOUNTER — PATIENT OUTREACH (OUTPATIENT)
Dept: ADMINISTRATIVE | Facility: CLINIC | Age: 68
End: 2018-03-07

## 2018-03-07 ENCOUNTER — TELEPHONE (OUTPATIENT)
Dept: FAMILY MEDICINE | Facility: CLINIC | Age: 68
End: 2018-03-07

## 2018-03-07 DIAGNOSIS — I27.22 PULMONARY HYPERTENSION DUE TO MITRAL VALVE DISEASE: ICD-10-CM

## 2018-03-07 DIAGNOSIS — N17.9 AKI (ACUTE KIDNEY INJURY): ICD-10-CM

## 2018-03-07 DIAGNOSIS — J96.01 ACUTE HYPOXEMIC RESPIRATORY FAILURE: ICD-10-CM

## 2018-03-07 DIAGNOSIS — I05.9 ENDOCARDITIS OF MITRAL VALVE: ICD-10-CM

## 2018-03-07 DIAGNOSIS — I38 ENDOCARDITIS, UNSPECIFIED CHRONICITY, UNSPECIFIED ENDOCARDITIS TYPE: ICD-10-CM

## 2018-03-07 DIAGNOSIS — I05.9 PULMONARY HYPERTENSION DUE TO MITRAL VALVE DISEASE: ICD-10-CM

## 2018-03-07 DIAGNOSIS — D64.9 ANEMIA, UNSPECIFIED TYPE: Primary | ICD-10-CM

## 2018-03-07 RX ORDER — MIRTAZAPINE 30 MG/1
30 TABLET, FILM COATED ORAL NIGHTLY
Qty: 30 TABLET | Refills: 3 | Status: SHIPPED | OUTPATIENT
Start: 2018-03-07 | End: 2018-04-26 | Stop reason: SDUPTHER

## 2018-03-07 NOTE — PATIENT INSTRUCTIONS
Discharge Instructions for Infective Endocarditis (IE)  You have been diagnosed with infective endocarditis (IE). This is an infection of the lining of the heart or of the heart valves. It happens when bacteria enter the bloodstream and go to the heart. The bacteria then cause infection in the heart. The bacteria can enter your bloodstream in a number of ways. It may happen during a dental procedure. It can happen through a cut. Or the bacteria can come from an infection elsewhere in the body. Your infection was treated in the hospital with strong antibiotics through an IV. This sheet will help you take care of yourself at home. This is a very serious infection and can be life threatening if not properly managed and cared for.  Home care  · You may need to continue IV therapy for up to 6 weeks at home. You will be given more instructions before you leave the hospital. Make sure to ask any questions you have. Your healthcare team will determine how long you should be on antibiotics and how often you should have follow up testing.  · Take the antibiotics until they are all gone. Take them even if you feel better. They treat the infection and prevent it from returning.  · Do not drive until your healthcare provider says its OK.  · Take good care of your teeth and mouth. Brush your teeth after meals. Floss as directed.  · Visit your dentist every 6 months. Dental infection is a risk factor for bacterial endocarditis. See your dentist immediately if you have a toothache or abscess.  · You might need to take an antibiotic before dental visits. Ask your healthcare provider for more information.  · Tell your healthcare provider about all infections you have, even small ones.  · Take good care of yourself. Get regular exercise and eat a healthy diet. Ask your healthcare provider for help as needed.  · Stop smoking.  · Be careful to get proper treatment of any open cuts that develop.  Follow-up care  Make a follow-up  appointment as directed by our staff. You will need follow up with an infectious disease doctor as well as a cardiologist and possibly a heart surgeon.  When to call your healthcare provider  Call your healthcare provider right away if you have any of the following:  · Tiredness that persists for 2 to 3 days  · Decreased exercise tolerance  · Chest pain or shortness of breath  · Fever over 100.4°F (38.0°C)  · Sweats  · Severe abdominal or flank pain  · Bloody urine   · Return of symptoms such as loss of appetite, weight loss, paleness, headache, or weakness  · Palpitations  · Fainting  · Trouble speaking  · Weakness in any extremity or face  · Spots on your fingernails, fingertips, whites of the eyes, or other parts of your skin  · Symptoms of a stroke such as trouble speaking or inability to move one side of your body.   Date Last Reviewed: 12/1/2016  © 8107-6100 [employer]. [address] [place] [address], [place] [address]. All rights reserved. This information is not intended as a substitute for professional medical care. Always follow your healthcare professional's instructions.

## 2018-03-07 NOTE — PLAN OF CARE
Problem: Fall Risk (Adult)  Goal: Absence of Falls  Patient will demonstrate the desired outcomes by discharge/transition of care.   Outcome: Outcome(s) achieved Date Met: 03/06/18  No falls this admission   03/06/18 2002   Fall Risk (Adult)   Absence of Falls achieves outcome       Problem: Infection, Risk/Actual (Adult)  Goal: Infection Prevention/Resolution  Patient will demonstrate the desired outcomes by discharge/transition of care.   Outcome: Ongoing (interventions implemented as appropriate)  Afebrile, being discharged to home with IV antibiotics.   03/06/18 2002   Infection, Risk/Actual (Adult)   Infection Prevention/Resolution making progress toward outcome        Problem: Breathing Pattern Ineffective (Adult)  Goal: Effective Oxygenation/Ventilation  Patient will demonstrate the desired outcomes by discharge/transition of care.   Outcome: Outcome(s) achieved Date Met: 03/06/18  No dyspnea noted and patient is not on O2   03/06/18 2002   Breathing Pattern Ineffective (Adult)   Effective Oxygenation/Ventilation achieves outcome       Problem: Anemia (Adult)  Goal: Symptom Improvement  Patient will demonstrate the desired outcomes by discharge/transition of care.   Outcome: Ongoing (interventions implemented as appropriate)  H&H remains low but stable; patient asymptomatic.   03/06/18 2002   Anemia (Adult)   Symptom Improvement making progress toward outcome

## 2018-03-07 NOTE — PROGRESS NOTES
PT did not receive Rozerem or Tamulosin from pharmacy. Called and spoke to Ty at [facility]. Insurance won't cover Flomax because it was filled 2\22 and they won't refill to 3\26. Asked her so what is he supposed to do until then because he will run out of med. She stated that they will send override to Insurance. Rozerem is not covered so Insurance wants them to try Trazadone first. Pt received package of Ipatropium-Albuterol at house. Med is not on med rec but on d\c summary. Message to d\c hospital med MD to clarify. Pt also did not receive nebulizer machine as one not ordered. Message to hospital d\c MD Dr. Rainey and pcp to order. Wife also asking what diet he should follow and if fluid restrictions like he had in hospital. This was not specified on d\c paperwork or d\c summary. Message to above Md. Wife notified on update on meds at pharmacy and that I will send messages to MD about diet, fluid restriction and nebulizer.

## 2018-03-07 NOTE — TELEPHONE ENCOUNTER
I notified the pt   Wife    Pt does have [facility] [place] company   I will cancel [facility]

## 2018-03-11 ENCOUNTER — HOSPITAL ENCOUNTER (EMERGENCY)
Facility: HOSPITAL | Age: 68
Discharge: HOME | End: 2018-03-11
Attending: EMERGENCY MEDICINE
Payer: MEDICARE

## 2018-03-11 VITALS
BODY MASS INDEX: 24.25 KG/M2 | SYSTOLIC BLOOD PRESSURE: 164 MMHG | WEIGHT: 160 LBS | DIASTOLIC BLOOD PRESSURE: 70 MMHG | RESPIRATION RATE: 19 BRPM | HEIGHT: 68 IN | TEMPERATURE: 99 F | HEART RATE: 105 BPM | OXYGEN SATURATION: 98 %

## 2018-03-11 DIAGNOSIS — R53.1 WEAKNESS: ICD-10-CM

## 2018-03-11 DIAGNOSIS — R53.83 FATIGUE: Primary | ICD-10-CM

## 2018-03-11 DIAGNOSIS — I33.0 BACTERIAL ENDOCARDITIS, UNSPECIFIED CHRONICITY: ICD-10-CM

## 2018-03-11 DIAGNOSIS — Z95.828 S/P PICC CENTRAL LINE PLACEMENT: ICD-10-CM

## 2018-03-11 DIAGNOSIS — R00.0 TACHYCARDIA: ICD-10-CM

## 2018-03-11 LAB
ALBUMIN SERPL BCP-MCNC: 2.6 G/DL
ALP SERPL-CCNC: 69 U/L
ALT SERPL W/O P-5'-P-CCNC: 22 U/L
ANION GAP SERPL CALC-SCNC: 10 MMOL/L
AST SERPL-CCNC: 20 U/L
BASOPHILS # BLD AUTO: 0.03 K/UL
BASOPHILS NFR BLD: 0.4 %
BILIRUB SERPL-MCNC: 0.4 MG/DL
BNP SERPL-MCNC: 208 PG/ML
BUN SERPL-MCNC: 17 MG/DL
CALCIUM SERPL-MCNC: 8.5 MG/DL
CHLORIDE SERPL-SCNC: 108 MMOL/L
CO2 SERPL-SCNC: 24 MMOL/L
CREAT SERPL-MCNC: 1.3 MG/DL
DIFFERENTIAL METHOD: ABNORMAL
EOSINOPHIL # BLD AUTO: 0.7 K/UL
EOSINOPHIL NFR BLD: 8.2 %
ERYTHROCYTE [DISTWIDTH] IN BLOOD BY AUTOMATED COUNT: 19.4 %
EST. GFR  (AFRICAN AMERICAN): >60 ML/MIN/1.73 M^2
EST. GFR  (NON AFRICAN AMERICAN): 56.5 ML/MIN/1.73 M^2
GLUCOSE SERPL-MCNC: 141 MG/DL
HCT VFR BLD AUTO: 24.7 %
HGB BLD-MCNC: 7.9 G/DL
IMM GRANULOCYTES # BLD AUTO: 0.04 K/UL
IMM GRANULOCYTES NFR BLD AUTO: 0.5 %
LACTATE SERPL-SCNC: 0.8 MMOL/L
LYMPHOCYTES # BLD AUTO: 0.8 K/UL
LYMPHOCYTES NFR BLD: 9.8 %
MCH RBC QN AUTO: 28.5 PG
MCHC RBC AUTO-ENTMCNC: 32 G/DL
MCV RBC AUTO: 89 FL
MONOCYTES # BLD AUTO: 0.6 K/UL
MONOCYTES NFR BLD: 7 %
NEUTROPHILS # BLD AUTO: 6.2 K/UL
NEUTROPHILS NFR BLD: 74.1 %
NRBC BLD-RTO: 0 /100 WBC
PLATELET # BLD AUTO: 212 K/UL
PMV BLD AUTO: 10.8 FL
POTASSIUM SERPL-SCNC: 3.8 MMOL/L
PROT SERPL-MCNC: 6.8 G/DL
RBC # BLD AUTO: 2.77 M/UL
SODIUM SERPL-SCNC: 142 MMOL/L
TROPONIN I SERPL DL<=0.01 NG/ML-MCNC: 0.01 NG/ML
WBC # BLD AUTO: 8.39 K/UL

## 2018-03-11 PROCEDURE — 80053 COMPREHEN METABOLIC PANEL: CPT

## 2018-03-11 PROCEDURE — 93010 ELECTROCARDIOGRAM REPORT: CPT | Mod: ,,, | Performed by: INTERNAL MEDICINE

## 2018-03-11 PROCEDURE — 99284 EMERGENCY DEPT VISIT MOD MDM: CPT | Mod: 25

## 2018-03-11 PROCEDURE — 99285 EMERGENCY DEPT VISIT HI MDM: CPT | Mod: ,,, | Performed by: EMERGENCY MEDICINE

## 2018-03-11 PROCEDURE — 83880 ASSAY OF NATRIURETIC PEPTIDE: CPT

## 2018-03-11 PROCEDURE — 87040 BLOOD CULTURE FOR BACTERIA: CPT

## 2018-03-11 PROCEDURE — 93005 ELECTROCARDIOGRAM TRACING: CPT

## 2018-03-11 PROCEDURE — 85025 COMPLETE CBC W/AUTO DIFF WBC: CPT

## 2018-03-11 PROCEDURE — 83605 ASSAY OF LACTIC ACID: CPT

## 2018-03-11 PROCEDURE — 84484 ASSAY OF TROPONIN QUANT: CPT

## 2018-03-11 NOTE — ED NOTES
The patient is awake, alert and acting age appropriately. Family at bedside.    No apparent distress noted. Pt c/o mild headache.  Airway is open and patent.  Respirations with normal effort and rate noted. Explanation of care provided to family and patient. No needs at this time. Will continue to monitor.

## 2018-03-11 NOTE — ED NOTES
Patient identifiers verified and correct for Rodolfo Messina.    LOC: The patient is awake, alert and aware of environment with an appropriate affect, the patient is oriented x 3 and speaking appropriately.  APPEARANCE: Patient resting comfortably and in no acute distress, patient is clean and well groomed, patient's clothing is properly fastened.  SKIN: The skin is warm and dry, color consistent with ethnicity, patient has normal skin turgor and moist mucus membranes, skin intact, no breakdown or bruising noted.  MUSCULOSKELETAL: Patient moving all extremities spontaneously, no obvious swelling or deformities noted.  RESPIRATORY: Airway is open and patent, respirations are spontaneous, patient has a normal effort and rate, no accessory muscle use noted, bilateral breath sounds clear and present.   CARDIAC: Patient has a normal rate and regular rhythm, no periphreal edema noted, capillary refill < 3 seconds.  ABDOMEN: Soft and non tender to palpation, no distention noted, normoactive bowel sounds present in all four quadrants.  NEUROLOGIC: PERRL, 3 mm bilaterally, eyes open spontaneously, behavior appropriate to situation, follows commands, facial expression symmetrical, bilateral hand grasp equal and even, purposeful motor response noted, normal sensation in all extremities when touched with a finger.

## 2018-03-11 NOTE — ED NOTES
"Pt states" I just feel weak. The home health nurse was supposed to come Friday and didn't. I can't wait to have my blood drawn. A week is too long. I'm bleeding from somewhere. They just don't know where." Pt denies fever, chills, N/V, dizziness, SOB.   "

## 2018-03-11 NOTE — ED PROVIDER NOTES
Encounter Date: 3/11/2018       History     Chief Complaint   Patient presents with    Weakness     has picc line to right arm. had recent transfusion. feels weak. thinks his blood count may be low again.      67 year old male with medical history of HTN, Anemia, Diverticulosis, Pulm nodules (unclear etiology) presenting to the ED with the chief complaint of weakness. Patient recently discharged on 3/6 after being treated for symptomatic anemia and infective valve endocarditis. Patient currently receiving Ampicillin and Ceftriaxone via PICC line. Patient reports feeling well after he was discharged. He states starting to feel fatigued and weak yesterday that has been progressively worsening today. He reports having intermittent chest tightness that is relieved after he coughs. He denies SOB and does not use O2 at home. He denies having fever and his wife routinely monitors his temperature. Patient reports blowing his nose a few days ago and having a blot clot on the tissue paper and subsequently developing redness in his right eye. No other reported blood loss. He denies vision changes and eye pain. He reports having difficulty sleeping. Patient reports home health was supposed to draw blood on Friday, but they never showed up. He is worried his blood counts are low. Patient reports tolerating food PO without vomiting. He denies abdominal pain, dysuria, hematuria, melena, hematochezia, diarrhea, constipation.           Review of patient's allergies indicates:   Allergen Reactions    Augmentin [amoxicillin-pot clavulanate]      Patient felt that it raised BP and requested to have it added as intolerance. Tolerated unasyn    Ciprofloxacin     Flagyl [metronidazole]     Lisinopril Other (See Comments)     cough    Mysoline [primidone]     Omnicef [cefdinir]     Shellfish containing products      Past Medical History:   Diagnosis Date    ANNA (acute kidney injury) 2/22/2018    Anemia     Anxiety     BPH  (benign prostatic hyperplasia)     Diverticulosis     Hypertension     Urinary retention      Past Surgical History:   Procedure Laterality Date    APPENDECTOMY      COLONOSCOPY      COLONOSCOPY N/A 2/1/2018    Procedure: COLONOSCOPY;  Surgeon: Richar Payan MD;  Location: [address]);  Service: Endoscopy;  Laterality: N/A;  PM prep    EYE SURGERY Right     cataract extraction    FINGER SURGERY      FRACTURE SURGERY Right     index finger    TONSILLECTOMY       Family History   Problem Relation Age of Onset    Stroke Mother     Heart disease Mother      CABG    Heart disease Father      CABG    No Known Problems Sister     No Known Problems Brother      Social History   Substance Use Topics    Smoking status: Never Smoker    Smokeless tobacco: Never Used    Alcohol use Yes      Comment: none recently     Review of Systems   Constitutional: Positive for fatigue. Negative for chills and fever.   HENT: Negative for congestion, sore throat and trouble swallowing.    Eyes: Positive for redness. Negative for pain, discharge, itching and visual disturbance.   Respiratory: Positive for cough. Negative for shortness of breath.    Cardiovascular: Positive for chest pain.   Gastrointestinal: Positive for nausea. Negative for abdominal pain, blood in stool, diarrhea and vomiting.   Genitourinary: Negative for dysuria and hematuria.   Musculoskeletal: Negative for back pain, neck pain and neck stiffness.   Skin: Negative for rash and wound.   Neurological: Positive for weakness and light-headedness. Negative for numbness and headaches.   Hematological: Bruises/bleeds easily.       Physical Exam     Initial Vitals [03/11/18 1357]   BP Pulse Resp Temp SpO2   (!) 193/84 110 20 99.2 °F (37.3 °C) 99 %      MAP       120.33         Physical Exam    Constitutional: He appears well-developed and well-nourished. He is not diaphoretic. No distress.   HENT:   Mouth/Throat: Oropharynx is clear and moist. No  oropharyngeal exudate.   Eyes: EOM are normal. Pupils are equal, round, and reactive to light. Right conjunctiva is injected. No scleral icterus.   Neck: Normal range of motion. Neck supple.   Cardiovascular: Regular rhythm. Tachycardia present.    Pulmonary/Chest: Breath sounds normal. No respiratory distress. He has no rales.   Abdominal: Soft. Bowel sounds are normal. He exhibits no distension. There is no tenderness.   Musculoskeletal: Normal range of motion. He exhibits no edema or tenderness.   PICC line in RUE, C/D/I   Neurological: He is alert and oriented to person, place, and time. He has normal strength. No sensory deficit.   Skin: Skin is warm and dry.   Scattered ecchymosis to upper extremities       Imaging Results          X-Ray Chest PA And Lateral (Final result)  Result time 03/11/18 14:54:29    Final result by Terry Castillo MD (03/11/18 14:54:29)                 Impression:       Vascular congestion, improved when compared to prior exam.          Electronically signed by: TERRY CASTILLO MD  Date:     03/11/18  Time:    14:54              Narrative:    [id number]  Accession # [id number]      Study:  XR CHEST PA AND LATERAL    Indication: Fatigue.    Comparison: Chest radiograph from 03/05/2018.    Findings:     XR CHEST PA AND LATERAL.    Stable right-sided PICC line catheter with its tip in the superior SVC.  Cardiac silhouette is normal in appearance.  Prominence of the pulmonary vasculature, improved when compared to prior exam.    Lungs well-aerated.  No focal consolidation.   No pleural effusion.  Osseous structures demonstrate no significant abnormalities.                            ED Course   Procedures  Labs Reviewed   CBC W/ AUTO DIFFERENTIAL - Abnormal; Notable for the following:        Result Value    RBC 2.77 (*)     Hemoglobin 7.9 (*)     Hematocrit 24.7 (*)     RDW 19.4 (*)     Lymph # 0.8 (*)     Eos # 0.7 (*)     Gran% 74.1 (*)     Lymph% 9.8 (*)     Eosinophil% 8.2 (*)     All  other components within normal limits   COMPREHENSIVE METABOLIC PANEL - Abnormal; Notable for the following:     Glucose 141 (*)     Calcium 8.5 (*)     Albumin 2.6 (*)     eGFR if non  56.5 (*)     All other components within normal limits   B-TYPE NATRIURETIC PEPTIDE - Abnormal; Notable for the following:      (*)     All other components within normal limits   CULTURE, BLOOD   CULTURE, BLOOD   TROPONIN I   LACTIC ACID, PLASMA                   APC / Resident Notes:   67 year old male with medical history of HTN, Anemia, Diverticulosis presenting to the ED c/o of 2 days of fatigue and weakness. Recently discharged on 3/6 after being treated for symptomatic anemia and infective valve endocarditis currently receiving Amp/Ceftriaxone via PICC. DDx includes but not limited to symptomatic anemia, electrolyte disturbance, sepsis, cardiac valve disorder. Will proceed with labs and CXR. Will give fluids.     WBC 8.3, decreased H/H 7.9/24.7 (mild decrease 8.2/24.6 on 3/6/18), electrolytes stable, Lactate 0.8, Troponin 0.010,  (-443)  CXR - Vascular congestion, improved from previous. No large focal consolidations    Labs are stable. No emergent blood transfusion or hospitalization warranted at this time. Will follow-up BCx and call patient if positive. Advised patient to continue PICC Abx treatments and use Tylenol for pain control. Patient has a follow-up with his PCP tomorrow for re-evaluation. Patient and wife agreeable to the plan. Return to ED precautions given. All of the patient's questions were answered. I have discussed the care of this patient with my supervising physician.          Attending Attestation:     Physician Attestation Statement for NP/PA:   I have conducted a face to face encounter with this patient in addition to the NP/PA, due to Medical Complexity    Other NP/PA Attestation Additions:    History of Present Illness: 68 y/o M recently discharged after prolonged  admission for endocarditis. Discharged home with IV abx. Just prior to d/c transfused prbcs. Pt has generally been doing well but has been feeling weak. Wife concerned his hgb is dropping and home health was suppose to come out yesterday for bloodwork but no one showed up. No fevers.    Medical Decision Making: Stable blood work  D/c home  F/u next week with pcp  Pt has f/u with hematology to ilda gonsalves                    Clinical Impression:   The primary encounter diagnosis was Fatigue. Diagnoses of Tachycardia, Weakness, Bacterial endocarditis, unspecified chronicity, and S/P PICC central line placement were also pertinent to this visit.    Disposition:   Disposition: Discharged  Condition: Stable                        Rainer Cuenca PA-C  03/11/18 2100       Nori Castillo MD  03/12/18 2317

## 2018-03-11 NOTE — DISCHARGE INSTRUCTIONS
Your labs conducted today are stable and your chest x-ray has improved from your previous. Please keep your appointment tomorrow with your primary care provider and discuss home health arrangements at that time. Please return to the emergency room if you develop chest pain, shortness of breath, or your fatigue worsens. You may receive a phone call in the future if your blood cultures develop any bacterial growth. Please continue taking your antibiotics as prescribed. You may use Tylenol for pain management.

## 2018-03-12 ENCOUNTER — PES CALL (OUTPATIENT)
Dept: ADMINISTRATIVE | Facility: CLINIC | Age: 68
End: 2018-03-12

## 2018-03-12 ENCOUNTER — OFFICE VISIT (OUTPATIENT)
Dept: FAMILY MEDICINE | Facility: CLINIC | Age: 68
End: 2018-03-12
Payer: MEDICARE

## 2018-03-12 VITALS
TEMPERATURE: 99 F | SYSTOLIC BLOOD PRESSURE: 150 MMHG | BODY MASS INDEX: 24.32 KG/M2 | WEIGHT: 160.5 LBS | HEART RATE: 107 BPM | OXYGEN SATURATION: 97 % | HEIGHT: 68 IN | DIASTOLIC BLOOD PRESSURE: 80 MMHG

## 2018-03-12 DIAGNOSIS — R04.0 EPISTAXIS: Primary | ICD-10-CM

## 2018-03-12 DIAGNOSIS — I33.0 BACTERIAL ENDOCARDITIS, UNSPECIFIED CHRONICITY: ICD-10-CM

## 2018-03-12 PROCEDURE — 3079F DIAST BP 80-89 MM HG: CPT | Mod: CPTII,S$GLB,, | Performed by: FAMILY MEDICINE

## 2018-03-12 PROCEDURE — 99214 OFFICE O/P EST MOD 30 MIN: CPT | Mod: S$GLB,,, | Performed by: FAMILY MEDICINE

## 2018-03-12 PROCEDURE — 3077F SYST BP >= 140 MM HG: CPT | Mod: CPTII,S$GLB,, | Performed by: FAMILY MEDICINE

## 2018-03-12 RX ORDER — HYDROCODONE BITARTRATE AND ACETAMINOPHEN 5; 325 MG/1; MG/1
1 TABLET ORAL EVERY 6 HOURS PRN
Qty: 25 TABLET | Refills: 0 | Status: SHIPPED | OUTPATIENT
Start: 2018-03-12 | End: 2018-03-25

## 2018-03-12 NOTE — PROGRESS NOTES
Patient ID: Rodolfo Messina is a 67 y.o. male.    Chief Complaint: Follow-up (hospital); Facial Pain (need something to help sleep); and Epistaxis    HPI       Rodolfo Messina is a 67 y.o. male here with follow-up after admission for endocarditis.  Continues to receive IV antibiotics home administration and home health.  Patient had episode of nasal dryness cracking and bleeding momentarily.  Stop without problems.  Patient with right facial fullness and mild pain above the right maxillary sinus.  No trauma.  Insomnia.  Complains of pain-lower back-history of muscle skeletal back pain    Review of Symptoms    Constitutional  recent decrease in activity, No chills/ fever -lately  Resp  Neg hemoptysis, stridor, choking  CVS  Neg chest pain, palpitations    Physical Exam    Constitutional:   Oriented to person, place, and time.appears well-developed and well-nourished.   No distress.     HENT:   Head: Normocephalic and atraumatic.     Right Ear: Tympanic membrane, external ear and ear canal normal     Left Ear: Tympanic membrane, external ear and ear canal normal    Nose: External Normal. Normal turbinates, No rhinorrhea -dry with a few excoriated areas right naris-probably due to dryness    Mouth/Throat: Uvula is midline, oropharynx is clear and moist and mucous membranes are normal.     Neck: supple no anterior cervical adenopathy    Facial area-no tenderness-no swelling-no erythema    Carotid artery:  Bruit    NEG []   POS[]    Eyes:   Conjunctivae are normal. Right eye exhibits no discharge. Left eye exhibits no discharge. No scleral icterus. No periorbital edema       Cardiovascular:  Regular rate, Regular rhythm     No extrasystole  Normal S1-S2    Pulmonary/Chest:   Normal effort and breath sounds.  No wheezing, rhonchi or rales.      Musculoskeletal:  No edema. No obvious deformity No wasting     Neurological:   Alert and oriented to person, place, and time. Coordination normal.     Skin:   Skin is warm  and dry.   No diaphoresis.   No rash noted.    Psychiatric: Normal mood and affect. Behavior is normal. Judgment and thought content normal.       Assessment / Plan:      ICD-10-CM ICD-9-CM   1. Epistaxis R04.0 784.7   2. Bacterial endocarditis, unspecified chronicity I33.0 421.0     Epistaxis    Bacterial endocarditis, unspecified chronicity    Other orders  -     hydrocodone-acetaminophen 5-325mg (NORCO) 5-325 mg per tablet; Take 1 tablet by mouth every 6 (six) hours as needed for Pain.  Dispense: 25 tablet; Refill: 0      Nose bleed-nasal saline spray  Muscle skeletal back pain-when necessary use of Norco

## 2018-03-13 ENCOUNTER — TELEPHONE (OUTPATIENT)
Dept: FAMILY MEDICINE | Facility: CLINIC | Age: 68
End: 2018-03-13

## 2018-03-13 NOTE — TELEPHONE ENCOUNTER
----- Message from Denice Addison sent at 3/13/2018  4:40 PM CDT -----  Contact: the pt's wife  States he is alright now, his hands were cold.  Still need to speak with Asiya.  Please call her at [phone]      I spoke with the pt   Wife  Nurse rubbed his hands  And his o2 was up to 98 %  Jaw is still swollen and painful   No fever    I advised to try 1/2 tablet of pain pills ( whole tablet caused him to vomit )  Or try alternating tylenol and motrin

## 2018-03-14 ENCOUNTER — OFFICE VISIT (OUTPATIENT)
Dept: INFECTIOUS DISEASES | Facility: CLINIC | Age: 68
End: 2018-03-14
Payer: MEDICARE

## 2018-03-14 ENCOUNTER — TELEPHONE (OUTPATIENT)
Dept: INFECTIOUS DISEASES | Facility: CLINIC | Age: 68
End: 2018-03-14

## 2018-03-14 ENCOUNTER — OFFICE VISIT (OUTPATIENT)
Dept: FAMILY MEDICINE | Facility: CLINIC | Age: 68
End: 2018-03-14
Payer: MEDICARE

## 2018-03-14 VITALS
SYSTOLIC BLOOD PRESSURE: 121 MMHG | DIASTOLIC BLOOD PRESSURE: 74 MMHG | HEART RATE: 103 BPM | BODY MASS INDEX: 24.33 KG/M2 | TEMPERATURE: 99 F | OXYGEN SATURATION: 98 % | RESPIRATION RATE: 15 BRPM | WEIGHT: 160 LBS

## 2018-03-14 VITALS
WEIGHT: 160.5 LBS | HEIGHT: 68 IN | TEMPERATURE: 98 F | SYSTOLIC BLOOD PRESSURE: 150 MMHG | HEART RATE: 108 BPM | BODY MASS INDEX: 24.32 KG/M2 | DIASTOLIC BLOOD PRESSURE: 73 MMHG

## 2018-03-14 DIAGNOSIS — R78.81 BACTEREMIA: ICD-10-CM

## 2018-03-14 DIAGNOSIS — I33.0 SUBACUTE BACTERIAL ENDOCARDITIS: Primary | ICD-10-CM

## 2018-03-14 DIAGNOSIS — M26.621 ARTHRALGIA OF RIGHT TEMPOROMANDIBULAR JOINT: ICD-10-CM

## 2018-03-14 DIAGNOSIS — H61.20 IMPACTED CERUMEN, UNSPECIFIED LATERALITY: Primary | ICD-10-CM

## 2018-03-14 DIAGNOSIS — H61.23 BILATERAL IMPACTED CERUMEN: ICD-10-CM

## 2018-03-14 LAB — T-SPOT TB SCREENING TEST: NORMAL

## 2018-03-14 PROCEDURE — 99214 OFFICE O/P EST MOD 30 MIN: CPT | Mod: S$GLB,,, | Performed by: INTERNAL MEDICINE

## 2018-03-14 PROCEDURE — 3078F DIAST BP <80 MM HG: CPT | Mod: CPTII,S$GLB,, | Performed by: INTERNAL MEDICINE

## 2018-03-14 PROCEDURE — 3078F DIAST BP <80 MM HG: CPT | Mod: CPTII,S$GLB,, | Performed by: NURSE PRACTITIONER

## 2018-03-14 PROCEDURE — 3074F SYST BP LT 130 MM HG: CPT | Mod: CPTII,S$GLB,, | Performed by: NURSE PRACTITIONER

## 2018-03-14 PROCEDURE — 3074F SYST BP LT 130 MM HG: CPT | Mod: CPTII,S$GLB,, | Performed by: INTERNAL MEDICINE

## 2018-03-14 PROCEDURE — 99999 PR PBB SHADOW E&M-EST. PATIENT-LVL III: CPT | Mod: PBBFAC,,, | Performed by: INTERNAL MEDICINE

## 2018-03-14 PROCEDURE — 99213 OFFICE O/P EST LOW 20 MIN: CPT | Mod: S$GLB,,, | Performed by: NURSE PRACTITIONER

## 2018-03-14 RX ORDER — IPRATROPIUM BROMIDE AND ALBUTEROL SULFATE 2.5; .5 MG/3ML; MG/3ML
3 SOLUTION RESPIRATORY (INHALATION) EVERY 4 HOURS PRN
COMMUNITY
Start: 2018-03-08 | End: 2018-07-16

## 2018-03-14 NOTE — PHYSICIAN QUERY
"PT Name: Rodolfo Messina  MR #: [id number]    Physician Query Form - Heart  Condition Clarification     CDS/: Damaris Hess RN              Contact information:[email or web address]  This form is a permanent document in the medical record.     Query Date: March 14, 2018    The medical record contains the following    Indicators                       Supporting Clinical Findings Location in Medical Record   X   443 Labs 2/24  Labs 2/25   X EF 65-70% 2D Echo 2/24   X Radiology findings There is cardiomegaly, moderate edema, and no change.     Mild interval worsening of bilateral airspace disease suggestive of pulmonary edema versus pneumonia or aspiration    CXR 2/26        CXR 2/25   X Echo Results 1 - Normal left ventricular systolic function (EF 65-70%).   2 - Normal right ventricular systolic function.   3 - Indeterminate LV diastolic function.   4 - Biatrial enlargement.   5 - Mild tricuspid regurgitation.   6 - Moderate mitral regurgitation.   7 - The mitral valve is thickened with evidence of systolic flattening and occasionally seen is a mobile echodensity that is likely evidence of endocarditis.   8 - Pulmonary hypertension. The estimated PA systolic pressure is 52 mmHg.   9 - Intermediate central venous pressure. 2D Echo 2/24     "Ascites" documented       X "SOB" or "HAQ" documented Resp- Positive for dry Cough, SOB  Progress note 2/22   X "Hypoxia" documented hypoxic overnight, started on oxygen via nasal canula    Progress note 2/24   X Heart Failure documented He was sent to the ED by his PCP after he was found to have low Hb on routine labs.   Cardiology was consulted for abnormal mitral valve with a mobile density on recent TTE in the setting of sepsis and heart failure.     Progress note Cardiology 2/28     "Edema" documented       X Diuretics/Meds Albuterol-apratropium 2.5mg-0.5mg/3ml nebulizer every 4 hours prn wheezing, SOB     Fluticasone 50mcg 1 spray each nare 2 times " daily     Furosemide 40mg IV 2 times daily       MAR - current 3/2   X Treatment: started on oxygen via nasal canula. BNP at 300. IV fluids discontinued. CX ray -lungs are symmetrically expanded bilaterally patchy increased interstitial and parietal attenuation bilaterally, may reflect edema or infection, and echocardiogram  . Duonebs changed to PRN for sinus tachycardia     Progress note 2/24     Other:               Provider, please specify diagnosis or diagnoses associated with above clinical findings.                                 [  ] Acute Systolic Heart Failure ( EF < 40)*  [ x ] Acute Diastolic Heart Failure ( EF > 40)*  [  ] Acute Combined Systolic and Diastolic Heart Failure  [  ] Other Type of Heart Failure (please specify type): _________________________  [  ] Heart Failure Ruled Out  [  ] Other (please specify): ___________________________________  [  ] Clinically Undetermined            *American Heart Association                                                                                                                                      Please document in your progress notes daily for the duration of treatment until resolved and include in your discharge summary.

## 2018-03-14 NOTE — PROGRESS NOTES
Subjective:      Patient ID: Rodolfo Messina is a 67 y.o. male.    Chief Complaint:Consult    History of Present Illness    Mr. 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  He was discharged on IV ampicillin and IV ceftriaxone.  He is here today of complaints of decreased hearing and pain/swelling of the lower left jaw.  He is concerned that this could be secondary to his IV antibiotics.    Review of Systems   Constitution: Positive for decreased appetite, weakness, malaise/fatigue and weight loss. Negative for chills, fever, night sweats and weight gain.   HENT: Positive for congestion, ear pain and hearing loss. Negative for hoarse voice, sore throat and tinnitus.    Eyes: Negative for blurred vision, redness and visual disturbance.   Cardiovascular: Positive for leg swelling. Negative for chest pain and palpitations.   Respiratory: Positive for cough. Negative for hemoptysis, shortness of breath and sputum production.    Hematologic/Lymphatic: Negative for adenopathy. Bruises/bleeds easily.   Skin: Negative for dry skin, itching, rash and suspicious lesions.   Musculoskeletal: Positive for back pain. Negative for joint pain, myalgias and neck pain.   Gastrointestinal: Negative for abdominal pain, constipation, diarrhea, heartburn, nausea and vomiting.   Genitourinary: Positive for frequency. Negative for dysuria, flank pain, hematuria, hesitancy and urgency.   Neurological: Positive for headaches. Negative for dizziness, numbness and paresthesias.   Psychiatric/Behavioral: Negative for depression and memory loss. The patient has insomnia and is nervous/anxious.      Objective:   Physical Exam   Constitutional: He is oriented to person, place, and time. He appears well-developed and well-nourished. No distress.   HENT:   Head: Normocephalic and atraumatic.   Right Ear: External ear normal.   Left Ear: External ear normal.   Nose:  Nose normal.   Mouth/Throat: Oropharynx is clear and moist. No oropharyngeal exudate.   #1 Extensive wax build up in both ears.  #2 Modest swelling in left lower mandibular area  #3 Poor dentition   Eyes: Conjunctivae and EOM are normal. Pupils are equal, round, and reactive to light. Right eye exhibits no discharge. Left eye exhibits no discharge. No scleral icterus.   Neck: Normal range of motion. Neck supple. No JVD present. No tracheal deviation present. No thyromegaly present.   Cardiovascular: Normal rate, regular rhythm and intact distal pulses.  Exam reveals no gallop and no friction rub.    No murmur heard.  Pulmonary/Chest: Effort normal and breath sounds normal. No stridor. No respiratory distress. He has no wheezes. He has no rales. He exhibits no tenderness.   Abdominal: Soft. Bowel sounds are normal. He exhibits no distension and no mass. There is no tenderness. There is no rebound and no guarding.   Musculoskeletal: Normal range of motion. He exhibits no edema or tenderness.   Lymphadenopathy:     He has no cervical adenopathy.   Neurological: He is alert and oriented to person, place, and time. He has normal reflexes. He displays normal reflexes. No cranial nerve deficit. He exhibits normal muscle tone. Coordination normal.   Skin: Skin is warm. No rash noted. He is not diaphoretic. No erythema. No pallor.   Psychiatric: He has a normal mood and affect. His behavior is normal. Judgment and thought content normal.   Nursing note and vitals reviewed.    Assessment:       1. Subacute bacterial endocarditis :  Labs reviewed.  Continue IV ceftriaxone and IV ampicillin for 6 weeks total for endocarditis.   2. Bacteremia :  Plan as above.   3. Bilateral impacted cerumen :  Referral to ENT.   4. Arthralgia of right temporomandibular joint :  Referral to ENT         Plan:       Subacute bacterial endocarditis        - IV ampicillin and IV ceftriaxone    Bacteremia        - IV ampicillin and IV  ceftriaxone    Bilateral impacted cerumen  -     Ambulatory referral to ENT    Arthralgia of right temporomandibular joint  -     Ambulatory referral to ENT

## 2018-03-14 NOTE — TELEPHONE ENCOUNTER
Spoke with patients wife and let her know that I was able to get the pt scheduled at the ENT clinic in [facility] on 3/20/18. The pts wife declined the appointment and said that they will see a ENT doctor in [place] or go to urgent care. DILLON

## 2018-03-14 NOTE — PROGRESS NOTES
Subjective:       Patient ID: Rodolfo Messina is a 67 y.o. male.    Chief Complaint: Cerumen Impaction    Ear Fullness    There is pain in both ears. This is a new problem. The current episode started in the past 7 days. The problem occurs constantly. The problem has been unchanged. There has been no fever. The patient is experiencing no pain. Pertinent negatives include no abdominal pain, coughing, diarrhea, ear discharge, headaches, hearing loss, neck pain, rash, rhinorrhea, sore throat or vomiting. There is no history of a chronic ear infection, hearing loss or a tympanostomy tube.     Review of Systems   Constitutional: Negative for chills, diaphoresis and fatigue.   HENT: Negative for ear discharge, hearing loss, rhinorrhea and sore throat.    Respiratory: Negative for cough and chest tightness.    Cardiovascular: Negative for chest pain, palpitations and leg swelling.   Gastrointestinal: Negative for abdominal pain, diarrhea and vomiting.   Musculoskeletal: Negative for neck pain.   Skin: Negative for rash.   Neurological: Negative for headaches.       Objective:      Physical Exam   Constitutional: He appears well-developed and well-nourished. No distress.   HENT:   Right Ear: External ear normal.   Left Ear: External ear normal.   Ear wax impaction noted to bilateral ears     Cardiovascular: Normal rate, regular rhythm and normal heart sounds.    Pulmonary/Chest: Effort normal and breath sounds normal. No respiratory distress. He has no wheezes.   Skin: Skin is warm and dry. He is not diaphoretic.   Psychiatric: He has a normal mood and affect. His behavior is normal. Judgment and thought content normal.   Vitals reviewed.      Assessment:       1. Impacted cerumen, unspecified laterality        Plan:       Impacted cerumen, unspecified laterality      Ears flushed ear wax removed  Pt tolerated well

## 2018-03-14 NOTE — LETTER
March 15, 2018      Babak Magallanes MD  [address]  [facility] [address]           [facility]  [address]  [facility] [address]  Phone: [phone]  Fax: [phone]          Patient: Rodolfo Messina   MR Number: [id number]   YOB: 1950   Date of Visit: 3/14/2018       Dear Dr. Babak Magallanes:    Thank you for referring Rodolfo Messina to me for evaluation. Attached you will find relevant portions of my assessment and plan of care.    If you have questions, please do not hesitate to call me. I look forward to following Rodolfo Messina along with you.    Sincerely,    Andre Ashraf MD    Enclosure  CC:  No Recipients    If you would like to receive this communication electronically, please contact [email or web address] or [phone] to request more information on [employer] access.    For providers and/or their staff who would like to refer a patient to [facility], please contact us through our one-stop-shop provider referral line, [facility], at [phone].    If you feel you have received this communication in error or would no longer like to receive these types of communications, please e-mail [email or web address]

## 2018-03-15 ENCOUNTER — LAB VISIT (OUTPATIENT)
Dept: LAB | Facility: HOSPITAL | Age: 68
End: 2018-03-15
Payer: MEDICARE

## 2018-03-15 ENCOUNTER — OFFICE VISIT (OUTPATIENT)
Dept: HEMATOLOGY/ONCOLOGY | Facility: CLINIC | Age: 68
End: 2018-03-15
Payer: MEDICARE

## 2018-03-15 VITALS
HEIGHT: 68 IN | RESPIRATION RATE: 17 BRPM | TEMPERATURE: 99 F | BODY MASS INDEX: 24.26 KG/M2 | WEIGHT: 160.06 LBS | OXYGEN SATURATION: 98 % | SYSTOLIC BLOOD PRESSURE: 142 MMHG | HEART RATE: 111 BPM | DIASTOLIC BLOOD PRESSURE: 69 MMHG

## 2018-03-15 DIAGNOSIS — D64.9 NORMOCYTIC ANEMIA: ICD-10-CM

## 2018-03-15 DIAGNOSIS — D64.9 NORMOCYTIC ANEMIA: Primary | ICD-10-CM

## 2018-03-15 LAB
ABO + RH BLD: NORMAL
ALBUMIN SERPL BCP-MCNC: 3 G/DL
ALP SERPL-CCNC: 72 U/L
ALT SERPL W/O P-5'-P-CCNC: 27 U/L
ANION GAP SERPL CALC-SCNC: 13 MMOL/L
AST SERPL-CCNC: 27 U/L
BASOPHILS # BLD AUTO: 0.04 K/UL
BASOPHILS NFR BLD: 0.4 %
BILIRUB SERPL-MCNC: 0.5 MG/DL
BLD GP AB SCN CELLS X3 SERPL QL: NORMAL
BUN SERPL-MCNC: 15 MG/DL
CALCIUM SERPL-MCNC: 9.8 MG/DL
CHLORIDE SERPL-SCNC: 101 MMOL/L
CO2 SERPL-SCNC: 24 MMOL/L
CREAT SERPL-MCNC: 1.2 MG/DL
DIFFERENTIAL METHOD: ABNORMAL
EOSINOPHIL # BLD AUTO: 0.4 K/UL
EOSINOPHIL NFR BLD: 3.6 %
ERYTHROCYTE [DISTWIDTH] IN BLOOD BY AUTOMATED COUNT: 18.9 %
EST. GFR  (AFRICAN AMERICAN): >60 ML/MIN/1.73 M^2
EST. GFR  (NON AFRICAN AMERICAN): >60 ML/MIN/1.73 M^2
GLUCOSE SERPL-MCNC: 122 MG/DL
HCT VFR BLD AUTO: 28.4 %
HGB BLD-MCNC: 9.1 G/DL
IMM GRANULOCYTES # BLD AUTO: 0.05 K/UL
IMM GRANULOCYTES NFR BLD AUTO: 0.5 %
LYMPHOCYTES # BLD AUTO: 1 K/UL
LYMPHOCYTES NFR BLD: 9.8 %
MCH RBC QN AUTO: 28.2 PG
MCHC RBC AUTO-ENTMCNC: 32 G/DL
MCV RBC AUTO: 88 FL
MONOCYTES # BLD AUTO: 0.8 K/UL
MONOCYTES NFR BLD: 8.4 %
NEUTROPHILS # BLD AUTO: 7.5 K/UL
NEUTROPHILS NFR BLD: 77.3 %
NRBC BLD-RTO: 0 /100 WBC
PLATELET # BLD AUTO: 234 K/UL
PMV BLD AUTO: 9.4 FL
POTASSIUM SERPL-SCNC: 3.9 MMOL/L
PROT SERPL-MCNC: 7.7 G/DL
RBC # BLD AUTO: 3.23 M/UL
SODIUM SERPL-SCNC: 138 MMOL/L
WBC # BLD AUTO: 9.7 K/UL

## 2018-03-15 PROCEDURE — 99204 OFFICE O/P NEW MOD 45 MIN: CPT | Mod: GC,S$GLB,, | Performed by: INTERNAL MEDICINE

## 2018-03-15 PROCEDURE — 85025 COMPLETE CBC W/AUTO DIFF WBC: CPT

## 2018-03-15 PROCEDURE — 86901 BLOOD TYPING SEROLOGIC RH(D): CPT

## 2018-03-15 PROCEDURE — 36415 COLL VENOUS BLD VENIPUNCTURE: CPT

## 2018-03-15 PROCEDURE — 3078F DIAST BP <80 MM HG: CPT | Mod: CPTII,GC,S$GLB, | Performed by: INTERNAL MEDICINE

## 2018-03-15 PROCEDURE — 80053 COMPREHEN METABOLIC PANEL: CPT

## 2018-03-15 PROCEDURE — 3077F SYST BP >= 140 MM HG: CPT | Mod: CPTII,GC,S$GLB, | Performed by: INTERNAL MEDICINE

## 2018-03-15 PROCEDURE — 85060 BLOOD SMEAR INTERPRETATION: CPT | Mod: ,,, | Performed by: PATHOLOGY

## 2018-03-15 PROCEDURE — 99999 PR PBB SHADOW E&M-EST. PATIENT-LVL IV: CPT | Mod: PBBFAC,GC,, | Performed by: INTERNAL MEDICINE

## 2018-03-15 NOTE — Clinical Note
Follow up on 6/14 with Dr. Borrego in [facility] clinic with lab work (Type/Screen, CBC, CMP). Thank you!

## 2018-03-16 ENCOUNTER — TELEPHONE (OUTPATIENT)
Dept: FAMILY MEDICINE | Facility: CLINIC | Age: 68
End: 2018-03-16

## 2018-03-16 ENCOUNTER — OFFICE VISIT (OUTPATIENT)
Dept: PULMONOLOGY | Facility: CLINIC | Age: 68
End: 2018-03-16
Payer: MEDICARE

## 2018-03-16 VITALS
BODY MASS INDEX: 23.64 KG/M2 | SYSTOLIC BLOOD PRESSURE: 138 MMHG | DIASTOLIC BLOOD PRESSURE: 78 MMHG | OXYGEN SATURATION: 96 % | HEART RATE: 99 BPM | WEIGHT: 159.63 LBS | HEIGHT: 69 IN

## 2018-03-16 DIAGNOSIS — R91.1 LUNG NODULE: ICD-10-CM

## 2018-03-16 DIAGNOSIS — I38 ENDOCARDITIS, UNSPECIFIED CHRONICITY, UNSPECIFIED ENDOCARDITIS TYPE: ICD-10-CM

## 2018-03-16 DIAGNOSIS — R05.3 PERSISTENT COUGH FOR 3 WEEKS OR LONGER: Primary | ICD-10-CM

## 2018-03-16 DIAGNOSIS — R09.82 POST-NASAL DRAINAGE: ICD-10-CM

## 2018-03-16 LAB
BACTERIA BLD CULT: NORMAL
BACTERIA BLD CULT: NORMAL

## 2018-03-16 PROCEDURE — 99214 OFFICE O/P EST MOD 30 MIN: CPT | Mod: S$GLB,,, | Performed by: NURSE PRACTITIONER

## 2018-03-16 PROCEDURE — 99999 PR PBB SHADOW E&M-EST. PATIENT-LVL III: CPT | Mod: PBBFAC,,, | Performed by: NURSE PRACTITIONER

## 2018-03-16 RX ORDER — CODEINE PHOSPHATE AND GUAIFENESIN 10; 100 MG/5ML; MG/5ML
5 SOLUTION ORAL 3 TIMES DAILY PRN
Qty: 180 ML | Refills: 0 | Status: SHIPPED | OUTPATIENT
Start: 2018-03-16 | End: 2018-03-23 | Stop reason: SDUPTHER

## 2018-03-16 NOTE — PHYSICIAN QUERY
PT Name: Rodolfo Messina  MR #: [id number]    Physician Query Form -Present on Admission (POA) Diagnosis Clarification     CDS/: Damaris Hess RN             Contact information: [email or web address]    This form is a permanent document in the medical record.     Query Date: March 16, 2018    By submitting this query, we are merely seeking further clarification of documentation. Please utilize your independent clinical judgment when addressing the question(s) below.       The Medical record contains the following:    Diagnosis      Supporting Clinical Information   Location in Medical Record     Severe Sepsis with Acute Organ Dysfunction       Cardiology was consulted for abnormal mitral valve with a mobile density on recent TTE in the setting of sepsis and heart failure    Procalcitonin 0.036  CRP 77.5    Enterococcus faecalis, gram positive cocci    Gram positive cocci in chains resembling Strep   Enterococcus faecalis     Blood culture from 2/25 growing gram positive cocci in 1/4 bottles. Low growth likely related to previous antibiotic exposure     Chart events documentation 3/2 @ 10:44am    Cardiology progress note 3/1      Labs 2/25  Labs 2/27    Blood culture 2/25      Blood culture 2/27        ID progress note 2/28         Doctor, Please specify Present On Admission (POA) status of ____Severe Sepsis____________________.    [ x ] Present on Admission   [  ] Not Present on Admission  [  ] Clinically undetermined

## 2018-03-16 NOTE — LETTER
March 16, 2018      Babak Magallanes MD  [address]  [facility] [address]           [facility]  [address]  [facility] [address]  Phone: [phone]          Patient: Rodolfo Messina   MR Number: [id number]   YOB: 1950   Date of Visit: 3/16/2018       Dear Dr. Babak Magallanes:    Thank you for referring Rodolfo Messina to me for evaluation. Attached you will find relevant portions of my assessment and plan of care.    If you have questions, please do not hesitate to call me. I look forward to following Rodolfo Messina along with you.    Sincerely,    Kinza Herrera, EDUARDA    Enclosure  CC:  No Recipients    If you would like to receive this communication electronically, please contact [email or web address] or [phone] to request more information on [employer] access.    For providers and/or their staff who would like to refer a patient to [facility], please contact us through our one-stop-shop provider referral line, [facility], at [phone].    If you feel you have received this communication in error or would no longer like to receive these types of communications, please e-mail [email or web address]

## 2018-03-16 NOTE — PROGRESS NOTES
Subjective:       Patient ID: Rodolfo Messina is a 67 y.o. male.    Chief Complaint:     HPI  Rodolfo Messina is a 67 y.o. male who presents for evaluation of a chronic cough since December 2017. Past medical history of HTN and essential tremor, recently with urinary retention requiring indwelling lackey. 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 Also admitted for symptomatic anemia associated with fatigue. He received one unit blood transfusion with improved Hgb from 7 to 8.5. He was discharged on IV ampicillin and IV ceftriaxone.      Patient has never had any previous respiratory issues such as asthma/COPD. Patient's cough is dry, non-productive worse when speaking and temperature changes. Noted to be relieved by chewing spearmint gum (witnessed during visit) or ice applied to neck according to patient and spouse. Cough wakes him up at night. His recent CXR showed improved pulmonary vasculature and lungs were well aerated without consolidation. His CT chest shows multiple pulmonary nodules. He is a never smoker. He did work in a career with exposure to asbestos. He has no hx of lung cancer.  States better when sleeps in chair, but not completely relieved. Denies hemoptysis, fever, chills, n/v/d.     Review of patient's allergies indicates:   Allergen Reactions    Augmentin [amoxicillin-pot clavulanate]      Patient felt that it raised BP and requested to have it added as intolerance. Tolerated unasyn    Ciprofloxacin     Flagyl [metronidazole]     Lisinopril Other (See Comments)     cough    Mysoline [primidone]     Omnicef [cefdinir]     Shellfish containing products      Past Medical History:   Diagnosis Date    ANNA (acute kidney injury) 2/22/2018    Anemia     Anxiety     BPH (benign prostatic hyperplasia)     Diverticulosis     Hypertension     Urinary retention      Past Surgical History:   Procedure Laterality Date    APPENDECTOMY   "    COLONOSCOPY      COLONOSCOPY N/A 2/1/2018    Procedure: COLONOSCOPY;  Surgeon: Richar Payan MD;  Location: [facility] ([address] [place] [address]);  Service: Endoscopy;  Laterality: N/A;  PM prep    EYE SURGERY Right     cataract extraction    FINGER SURGERY      FRACTURE SURGERY Right     index finger    TONSILLECTOMY       Family History     Problem Relation (Age of Onset)    Heart disease Mother, Father    No Known Problems Sister, Brother    Stroke Mother        Social History Main Topics    Smoking status: Never Smoker    Smokeless tobacco: Never Used    Alcohol use Yes      Comment: none recently    Drug use: No    Sexual activity: Not Currently     Partners: Female       Review of Systems   Constitutional: Positive for weight loss, appetite change and fatigue. Negative for fever, chills and night sweats.   HENT: Positive for postnasal drip (seen on endoscopy according to spouse). Negative for sinus pressure and congestion.    Eyes: Negative for redness.   Respiratory: Positive for cough and asthma nighttime symptoms. Negative for sputum production, chest tightness, shortness of breath, wheezing and dyspnea on extertion.    Cardiovascular: Positive for leg swelling. Negative for chest pain.   Genitourinary: Positive for difficulty urinating.   Musculoskeletal: Negative for gait problem.   Skin: Negative for rash.   Gastrointestinal: Negative for nausea, vomiting and abdominal pain.   Neurological: Negative for dizziness.   Psychiatric/Behavioral: Negative for confusion. The patient is not nervous/anxious.        Objective:       Vitals:    03/16/18 0913 03/16/18 1148   BP: 138/78    Pulse: 99    SpO2: (!) 88% 96%  Comment: when ambulated 200 ft.at brisk pace.   Weight: 72.4 kg (159 lb 9.8 oz)    Height: 5' 9" (1.753 m)      At rest O2 retested as well 98%.    Physical Exam   Constitutional: He is oriented to person, place, and time. He appears well-developed and well-nourished.   HENT:   Head: Normocephalic. "   Right Ear: External ear normal.   Left Ear: External ear normal.   Nose: Nose normal.   Neck: Normal range of motion. No JVD present.   Cardiovascular: Normal rate and normal heart sounds.    Pulmonary/Chest: Normal expansion, effort normal and breath sounds normal. No respiratory distress. He has no wheezes. He has no rhonchi.   Cough dry in nature worse when speaking. Witnessed chewing gum alleviating cough. Patient able to speak without issue.   Abdominal: Soft. He exhibits no distension. There is no tenderness.   Musculoskeletal: Normal range of motion. He exhibits no edema.   Neurological: He is alert and oriented to person, place, and time.   Skin: Skin is warm and dry.   Psychiatric: He has a normal mood and affect. His behavior is normal. Judgment and thought content normal.   Vitals reviewed.    Personal Diagnostic Review    Case reviewed with Dr. Bolanos.   CT chest reviewed   CXR reviewed  PFT's reviewed   Labs reviewed  Hospital notes reviewed   Assessment:          Outpatient Encounter Prescriptions as of 3/16/2018   Medication Sig Dispense Refill    albuterol-ipratropium 2.5mg-0.5mg/3mL (DUO-NEB) 0.5 mg-3 mg(2.5 mg base)/3 mL nebulizer solution       amLODIPine (NORVASC) 10 MG tablet Take 1 tablet (10 mg total) by mouth once daily. 30 tablet 1    AMPICILLIN SODIUM (AMPICILLIN 2 G/100 ML NS, READY TO MIX SYSTEM,) Inject 100 mLs (2 g total) into the vein every 4 (four) hours. 76186 mL 1    benzonatate (TESSALON) 100 MG capsule Take 1 capsule (100 mg total) by mouth 3 (three) times daily as needed for Cough. 90 capsule 2    cefTRIAXone 2 g in dextrose 5 % 50 mL (ROCEPHIN) 2 g/50 mL PgBk IVPB Inject 50 mLs (2 g total) into the vein every 12 (twelve) hours. 60 each 1    CHERATUSSIN AC  mg/5 mL syrup Take 10 mLs by mouth every 6 (six) hours as needed. 1 Bottle 0    fluticasone (FLONASE) 50 mcg/actuation nasal spray 1 spray (50 mcg total) by Each Nare route 2 (two) times daily. 1 Bottle 11     furosemide (LASIX) 40 MG tablet Take 1 tablet (40 mg total) by mouth once daily. 30 tablet 1    hydrocodone-acetaminophen 5-325mg (NORCO) 5-325 mg per tablet Take 1 tablet by mouth every 6 (six) hours as needed for Pain. 25 tablet 0    loratadine (CLARITIN) 10 mg tablet Take 10 mg by mouth once daily.      mirtazapine (REMERON) 30 MG tablet Take 1 tablet (30 mg total) by mouth every evening. 30 tablet 3    ramelteon (ROZEREM) 8 mg tablet Take 1 tablet (8 mg total) by mouth nightly as needed for Insomnia. 30 tablet 1    senna-docusate 8.6-50 mg (PERICOLACE) 8.6-50 mg per tablet Take 2 tablets by mouth once daily.      tamsulosin (FLOMAX) 0.4 mg Cp24 Take 2 capsules (0.8 mg total) by mouth 2 (two) times daily. 120 capsule 11    VENTOLIN HFA 90 mcg/actuation inhaler        No facility-administered encounter medications on file as of 3/16/2018.      Problem List Items Addressed This Visit        Cardiac/Vascular    Endocarditis      Other Visit Diagnoses     Persistent cough for 3 weeks or longer    -  Primary    Lung nodule        Relevant Orders    CT Chest Without Contrast    Post-nasal drainage                Plan:   · Repeat CT chest w/o contrast for follow up of pulmonary nodules  · Refer to ENT for chronic sinus cough  Nasal Saline:  Use distilled water only.  Be sure to tilt head forward as shown in picture.Squirt into nostril 2 times until you taste saline in back of throat. Spit, and blow nose. Do this 4 times, alternating right and left nostril. Do this routine 2 times per day once in morning once at night.  Gargle with 6-8 ounce warm salt water 2 times per day once in morning once at night.      Take claritin night  Flonase 2 sprays each nostril daily  Continue all of these things everyday for 2 weeks  Follow up in 3 months with CT prior to visit

## 2018-03-16 NOTE — PROGRESS NOTES
HEMATOLOGY/ONCOLOGY CONSULT    Reason for consult: Anemia    HPI:   Mr. Messina is a 66 y/o  Male with PMHx of HTN, BPH with intermittent indwelling lackey, Anemia, and a chronic cough. He was admitted in February, 2018 with recurrent anemia revealed in labs done by PCP. Hgb dropped from recent 8 to 7 g/dL.  Patient was previously admitted overnight 01/27/18 - 01/28/18 for symptomatic anemia associated with fatigue. He received one unit blood transfusion with improved Hgb from 7 to 8.5. Follow up EGD and colonoscopy on 02/01/18 which showed non bleeding erosive gastropathy, internal hemorrhoid and diverticulosis in sigmoid and descending colon. He had been scheduled to follow up with heme oncology clinic for evaluation of anemia. Patient denied dark tarry stool, BRBPR, hematuria, hemoptysis or bleeding from any other sites. FOBT negative in ED when admitted. He reports persistent dry cough since around roseanne when he developed URI. Also reports occasional low grade fever and night sweats. Notes fatigue, decreased appetite and 10 lb weight loss since roseanne. Hematology consulted for evaluation of Anemia while inpatient and was recommend for an outpatient evaluation given he was acutely ill from respiratory and infectious disease standpoint. His hospital course was complicated by acute hypoxemic respiratory failure requiring ICU admission. He was eventually found to have endocarditis with enterococcus Faecalis bacteremia. He was discharged needing 6 weeks total of ampillcin and rocephin from 3/1.    Today he presents with his wife. Exercise tolerance improving gradually now. Has not required any blood transfusions since discharge. Has fatigue and chronic cough.     Past Medical History:   Diagnosis Date    ANNA (acute kidney injury) 2/22/2018    Anemia     Anxiety     BPH (benign prostatic hyperplasia)     Diverticulosis     Hypertension     Urinary retention      Past Surgical History:   Procedure  Laterality Date    APPENDECTOMY      COLONOSCOPY      COLONOSCOPY N/A 2/1/2018    Procedure: COLONOSCOPY;  Surgeon: Richar Payan MD;  Location: [facility] ([address] [place] [address]);  Service: Endoscopy;  Laterality: N/A;  PM prep    EYE SURGERY Right     cataract extraction    FINGER SURGERY      FRACTURE SURGERY Right     index finger    TONSILLECTOMY         Allergies:   Augmentin [amoxicillin-pot clavulanate]; Ciprofloxacin; Flagyl [metronidazole]; Lisinopril; Mysoline [primidone]; Omnicef [cefdinir]; and Shellfish containing products      Medications:     Current Outpatient Prescriptions   Medication Sig Dispense Refill    albuterol-ipratropium 2.5mg-0.5mg/3mL (DUO-NEB) 0.5 mg-3 mg(2.5 mg base)/3 mL nebulizer solution       amLODIPine (NORVASC) 10 MG tablet Take 1 tablet (10 mg total) by mouth once daily. 30 tablet 1    AMPICILLIN SODIUM (AMPICILLIN 2 G/100 ML NS, READY TO MIX SYSTEM,) Inject 100 mLs (2 g total) into the vein every 4 (four) hours. 46345 mL 1    benzonatate (TESSALON) 100 MG capsule Take 1 capsule (100 mg total) by mouth 3 (three) times daily as needed for Cough. 90 capsule 2    cefTRIAXone 2 g in dextrose 5 % 50 mL (ROCEPHIN) 2 g/50 mL PgBk IVPB Inject 50 mLs (2 g total) into the vein every 12 (twelve) hours. 60 each 1    CHERATUSSIN AC  mg/5 mL syrup Take 10 mLs by mouth every 6 (six) hours as needed. 1 Bottle 0    fluticasone (FLONASE) 50 mcg/actuation nasal spray 1 spray (50 mcg total) by Each Nare route 2 (two) times daily. 1 Bottle 11    furosemide (LASIX) 40 MG tablet Take 1 tablet (40 mg total) by mouth once daily. 30 tablet 1    hydrocodone-acetaminophen 5-325mg (NORCO) 5-325 mg per tablet Take 1 tablet by mouth every 6 (six) hours as needed for Pain. 25 tablet 0    loratadine (CLARITIN) 10 mg tablet Take 10 mg by mouth once daily.      mirtazapine (REMERON) 30 MG tablet Take 1 tablet (30 mg total) by mouth every evening. 30 tablet 3    tamsulosin (FLOMAX) 0.4 mg Cp24 Take 2  capsules (0.8 mg total) by mouth 2 (two) times daily. 120 capsule 11    VENTOLIN HFA 90 mcg/actuation inhaler        No current facility-administered medications for this visit.        Social History     Social History    Marital status:      Spouse name: N/A    Number of children: N/A    Years of education: N/A     Occupational History     [employer]     Social History Main Topics    Smoking status: Never Smoker    Smokeless tobacco: Never Used    Alcohol use Yes      Comment: none recently    Drug use: No    Sexual activity: Not Currently     Partners: Female     Other Topics Concern    Not on file     Social History Narrative    No narrative on file     Family History   Problem Relation Age of Onset    Stroke Mother     Heart disease Mother      CABG    Heart disease Father      CABG    No Known Problems Sister     No Known Problems Brother        Review Of Systems:   Constitution: Positive for decreased appetite, weakness, malaise/fatigue and weight loss. Negative for chills, fever, night sweats and weight gain.   HENT: Positive for congestion, ear pain and hearing loss. Negative for hoarse voice, sore throat and tinnitus.    Eyes: Negative for blurred vision, redness and visual disturbance.   Cardiovascular: Negative for chest pain and palpitations.   Respiratory: Positive for cough. Negative for hemoptysis, shortness of breath and sputum production.    Hematologic/Lymphatic: Negative for adenopathy. Bruises/bleeds easily.   Skin: Negative for dry skin, itching, rash and suspicious lesions.   Musculoskeletal:  Negative for joint pain, myalgias and neck pain.   Gastrointestinal: Negative for abdominal pain, constipation, diarrhea, heartburn, nausea and vomiting.   Genitourinary: Positive for frequency. Negative for dysuria, flank pain, hematuria, hesitancy and urgency.   Neurological: Negative for dizziness, numbness and paresthesias.   Psychiatric/Behavioral: Negative for depression and  memory loss. The patient has insomnia and is nervous/anxious.     Physical Exam:   Performance Status: 1  Constitutional: He is oriented to person, place, and time. He appears well-developed and well-nourished. No distress.   HENT:   Head: Normocephalic and atraumatic.   Right Ear: External ear normal.   Left Ear: External ear normal.   Nose: Nose normal.   Mouth/Throat: Oropharynx is clear and moist. No oropharyngeal exudate. Poor dentition   Eyes: Conjunctivae and EOM are normal. Pupils are equal, round, and reactive to light. Right eye exhibits no discharge. Left eye exhibits no discharge. No scleral icterus.   Neck: Normal range of motion. Neck supple. No JVD present. No tracheal deviation present. No thyromegaly present.   Cardiovascular: Normal rate, regular rhythm and intact distal pulses.  Exam reveals no gallop and no friction rub.    Pulmonary/Chest: Effort normal and breath sounds normal. No stridor. No respiratory distress. He has no wheezes. He has no rales. He exhibits no tenderness.   Abdominal: Soft. Bowel sounds are normal. He exhibits no distension and no mass. There is no tenderness. There is no rebound and no guarding.   Musculoskeletal: Normal range of motion. He exhibits no edema or tenderness.   Lymphadenopathy: He has no cervical adenopathy.   Neurological: He is alert and oriented to person, place, and time. He has normal reflexes. He displays normal reflexes. No cranial nerve deficit. He exhibits normal muscle tone. Coordination normal.   Skin: Skin is warm. Small ecchymosis noted on left forearm.   Psychiatric: He has a normal mood and affect. His behavior is normal. Judgment and thought content normal.     Diagnostic Tests:     Recent Results (from the past 48 hour(s))   Type & Screen    Collection Time: 03/15/18 11:26 AM   Result Value Ref Range    Group & Rh A POS     Indirect Juan Alberto NEG    CBC auto differential    Collection Time: 03/15/18 11:26 AM   Result Value Ref Range    WBC 9.70  3.90 - 12.70 K/uL    RBC 3.23 (L) 4.60 - 6.20 M/uL    Hemoglobin 9.1 (L) 14.0 - 18.0 g/dL    Hematocrit 28.4 (L) 40.0 - 54.0 %    MCV 88 82 - 98 fL    MCH 28.2 27.0 - 31.0 pg    MCHC 32.0 32.0 - 36.0 g/dL    RDW 18.9 (H) 11.5 - 14.5 %    Platelets 234 150 - 350 K/uL    MPV 9.4 9.2 - 12.9 fL    Immature Granulocytes 0.5 0.0 - 0.5 %    Gran # (ANC) 7.5 1.8 - 7.7 K/uL    Immature Grans (Abs) 0.05 (H) 0.00 - 0.04 K/uL    Lymph # 1.0 1.0 - 4.8 K/uL    Mono # 0.8 0.3 - 1.0 K/uL    Eos # 0.4 0.0 - 0.5 K/uL    Baso # 0.04 0.00 - 0.20 K/uL    nRBC 0 0 /100 WBC    Gran% 77.3 (H) 38.0 - 73.0 %    Lymph% 9.8 (L) 18.0 - 48.0 %    Mono% 8.4 4.0 - 15.0 %    Eosinophil% 3.6 0.0 - 8.0 %    Basophil% 0.4 0.0 - 1.9 %    Differential Method Automated    Comprehensive metabolic panel    Collection Time: 03/15/18 11:26 AM   Result Value Ref Range    Sodium 138 136 - 145 mmol/L    Potassium 3.9 3.5 - 5.1 mmol/L    Chloride 101 95 - 110 mmol/L    CO2 24 23 - 29 mmol/L    Glucose 122 (H) 70 - 110 mg/dL    BUN, Bld 15 8 - 23 mg/dL    Creatinine 1.2 0.5 - 1.4 mg/dL    Calcium 9.8 8.7 - 10.5 mg/dL    Total Protein 7.7 6.0 - 8.4 g/dL    Albumin 3.0 (L) 3.5 - 5.2 g/dL    Total Bilirubin 0.5 0.1 - 1.0 mg/dL    Alkaline Phosphatase 72 55 - 135 U/L    AST 27 10 - 40 U/L    ALT 27 10 - 44 U/L    Anion Gap 13 8 - 16 mmol/L    eGFR if African American >60.0 >60 mL/min/1.73 m^2    eGFR if non African American >60.0 >60 mL/min/1.73 m^2   Pathologist Interpretation Differential    Collection Time: 03/15/18 11:26 AM   Result Value Ref Range    Pathologist Review Review required        Assessment:     1. Normocytic anemia  Type & Screen    CBC auto differential    Comprehensive metabolic panel    Pathologist Interpretation Differential    Type & Screen     Recommendations:     1. Normocytic Anemia  - prior to 2018, the last Hb/Hct for comparison was in December 2015, which showed mild normocytic anemia of 12.5 g/dL.  - not associated with other cytopenias.    - EGD/Colonoscopy on 2/1/18 only showed non-erosive gastropathy (mild chronic gastritis), internal hemorrhoids, and diverticulosis.  - CT of Abdomen/Pelvis in January 2018, did not reveal any obvious hepatosplenomegaly, lymphadenopathy or abnormal bony lesions.    - nutritional, viral studies, SPEP, TSH, and hemolysis panel unremarkable.    - his reticulocyte index inappropriate low during his hospital admission but likely secondary to marrow suppression from acute illness (bacteremia/endocarditis) and medications.   - Hb 9.1 g/dL now, improving gradually. anemia likely due to inflammation/reactive process.   - Would recommend close observation. He is getting weekly CBC while on antibiotics and advised wife to have results faxed to Hematology Clinic so we can monitor.   - otherwise, would recommend follow up in 3 months with CBC.         2. Multiple Pulmonary Nodules/Chronic Cough  - f/u in Pulmonary Clinic for evaluation and surveillance       Will call patient with results.  RTC in 3 months with CBC.     Case discussed with Dr. Linh Borrego MD  Hematology/Oncology Fellow     Agree with Dr. Borrego's history, physical, assessment, and plan with no further addendums.    Angel Melton MD  Hematology & Stem Cell Transplant

## 2018-03-16 NOTE — PATIENT INSTRUCTIONS
· Refer to ENT for chronic sinus cough  Nasal Saline:  Use distilled water only.  Be sure to tilt head forward as shown in picture.Squirt into nostril 2 times until you taste saline in back of throat. Spit, and blow nose. Do this 4 times, alternating right and left nostril. Do this routine 2 times per day once in morning once at night.  Gargle with 6-8 ounce warm salt water 2 times per day once in morning once at night.      Take claritin night  Flonase 2 sprays each nostril daily  Continue all of these things everyday for 2 weeks

## 2018-03-19 DIAGNOSIS — I10 ESSENTIAL HYPERTENSION: Chronic | ICD-10-CM

## 2018-03-19 LAB
PATH REV BLD -IMP: NORMAL
PATH REV BLD -IMP: NORMAL

## 2018-03-19 NOTE — TELEPHONE ENCOUNTER
----- Message from Monse Agosto sent at 3/19/2018  9:44 AM CDT -----  Contact: wife-Lupe  Calling on patient's behalf. Patient experiencing swelling in feet. Wife wants to know if you can change/adjust fluid pill?

## 2018-03-20 ENCOUNTER — TELEPHONE (OUTPATIENT)
Dept: FAMILY MEDICINE | Facility: CLINIC | Age: 68
End: 2018-03-20

## 2018-03-20 ENCOUNTER — TELEPHONE (OUTPATIENT)
Dept: INFECTIOUS DISEASES | Facility: CLINIC | Age: 68
End: 2018-03-20

## 2018-03-20 RX ORDER — AMLODIPINE BESYLATE 10 MG/1
10 TABLET ORAL DAILY
Qty: 90 TABLET | Refills: 3 | Status: ON HOLD | OUTPATIENT
Start: 2018-03-20 | End: 2018-03-26 | Stop reason: HOSPADM

## 2018-03-20 NOTE — TELEPHONE ENCOUNTER
Pt on IV ceftriaxone and IV ampicillin and reports swelling in his feet. Is this a side effect of the abx? Please advise. DILLON

## 2018-03-20 NOTE — TELEPHONE ENCOUNTER
----- Message from Katalina Samaniego sent at 3/20/2018  9:16 AM CDT -----  Contact: [phone]  Called to say that his feet are swollen might be a side effect from the antibiotics. Please call.

## 2018-03-20 NOTE — TELEPHONE ENCOUNTER
----- Message from Monse Agosto sent at 3/20/2018  8:47 AM CDT -----  Patient is requesting a medication refill.     RX name: amLODIPine (NORVASC) 10 MG tablet  Strength:   Quantity: 90 day with refills  Directions:  Take 1 tablet (10 mg total) by mouth once daily. - Oral    Pharm[facility] name: [facility]

## 2018-03-20 NOTE — TELEPHONE ENCOUNTER
----- Message from Monse Agosto sent at 3/20/2018 11:16 AM CDT -----  Contact: wife Lupe  Message left on yesterday reference to feet swelling.  Patient still experiencing feet swelling. Has them elevated. Has had swelling 3 days. Wife doesn't think fluid pill is strong enough. Patient is watching his diet. Swelling is very concerning      Lupe - [phone]

## 2018-03-23 ENCOUNTER — OFFICE VISIT (OUTPATIENT)
Dept: OTOLARYNGOLOGY | Facility: CLINIC | Age: 68
DRG: 193 | End: 2018-03-23
Payer: MEDICARE

## 2018-03-23 VITALS
HEART RATE: 97 BPM | TEMPERATURE: 100 F | BODY MASS INDEX: 23.91 KG/M2 | SYSTOLIC BLOOD PRESSURE: 119 MMHG | WEIGHT: 161.94 LBS | DIASTOLIC BLOOD PRESSURE: 62 MMHG

## 2018-03-23 DIAGNOSIS — J30.89 CHRONIC NON-SEASONAL ALLERGIC RHINITIS, UNSPECIFIED TRIGGER: ICD-10-CM

## 2018-03-23 DIAGNOSIS — R91.8 PULMONARY NODULES: ICD-10-CM

## 2018-03-23 DIAGNOSIS — I05.9 ENDOCARDITIS OF MITRAL VALVE: ICD-10-CM

## 2018-03-23 DIAGNOSIS — R05.3 CHRONIC COUGH: Primary | ICD-10-CM

## 2018-03-23 DIAGNOSIS — I08.0 MITRAL AND AORTIC VALVE REGURGITATION: ICD-10-CM

## 2018-03-23 PROCEDURE — 99999 PR PBB SHADOW E&M-EST. PATIENT-LVL IV: CPT | Mod: PBBFAC,,, | Performed by: OTOLARYNGOLOGY

## 2018-03-23 PROCEDURE — 99204 OFFICE O/P NEW MOD 45 MIN: CPT | Mod: 25,S$GLB,, | Performed by: OTOLARYNGOLOGY

## 2018-03-23 PROCEDURE — 3074F SYST BP LT 130 MM HG: CPT | Mod: CPTII,S$GLB,, | Performed by: OTOLARYNGOLOGY

## 2018-03-23 PROCEDURE — 0CJS8ZZ INSPECTION OF LARYNX, VIA NATURAL OR ARTIFICIAL OPENING ENDOSCOPIC: ICD-10-PCS | Performed by: OTOLARYNGOLOGY

## 2018-03-23 PROCEDURE — 3078F DIAST BP <80 MM HG: CPT | Mod: CPTII,S$GLB,, | Performed by: OTOLARYNGOLOGY

## 2018-03-23 PROCEDURE — 31575 DIAGNOSTIC LARYNGOSCOPY: CPT | Mod: S$GLB,,, | Performed by: OTOLARYNGOLOGY

## 2018-03-23 RX ORDER — CODEINE PHOSPHATE AND GUAIFENESIN 10; 100 MG/5ML; MG/5ML
5 SOLUTION ORAL 3 TIMES DAILY PRN
Qty: 180 ML | Refills: 0 | Status: ON HOLD | OUTPATIENT
Start: 2018-03-23 | End: 2018-03-26 | Stop reason: HOSPADM

## 2018-03-23 RX ORDER — BENZONATATE 100 MG/1
100 CAPSULE ORAL 3 TIMES DAILY PRN
COMMUNITY
Start: 2018-03-08 | End: 2018-07-17

## 2018-03-23 NOTE — PROGRESS NOTES
Chief Complaint   Patient presents with    Cough     chronic cough   .     HPI:     Mr. Messina is a 67 year old male who is referred for evaluation of chronic cough. He states that the cough has been a dry in nature.  He states that it is worse at night. He reports intermittent hoarseness.  He is currently on Flonase and Claritin. He is taking Cheratussin cough syrup.  He has been using nebulizer as well without relief. He has had a complicated medical history with recent hospitalization for endocarditis with significant aortic and mitral valve insufficiency.  He recently saw pulmonology NP Kinza Herrera for chronic cough.  She was concerned that cough may be due to post-nasal drip; therefore, he was referred for evaluation.      He denies nasal congestion. He does report nosebleed when endoscopy was performed during his last hospitalization.  He denies facial pain or pressure, denies change in sense of smell or taste. He reports intermittent post-nasal drip but is unsure that he can coordinate this with his cough. He denies fevers, chills, or night sweats.       Past Medical History:   Diagnosis Date    ANNA (acute kidney injury) 2/22/2018    Anemia     Anxiety     BPH (benign prostatic hyperplasia)     Diverticulosis     Hypertension     Urinary retention      Social History     Social History    Marital status:      Spouse name: N/A    Number of children: N/A    Years of education: N/A     Occupational History     [employer]     Social History Main Topics    Smoking status: Never Smoker    Smokeless tobacco: Never Used    Alcohol use Yes      Comment: none recently    Drug use: No    Sexual activity: Not Currently     Partners: Female     Other Topics Concern    Not on file     Social History Narrative    No narrative on file     Past Surgical History:   Procedure Laterality Date    APPENDECTOMY      COLONOSCOPY      COLONOSCOPY N/A 2/1/2018    Procedure: COLONOSCOPY;  Surgeon: Richar  SURI Payan MD;  Location: [facility] ([address] [place] [address]);  Service: Endoscopy;  Laterality: N/A;  PM prep    EYE SURGERY Right     cataract extraction    FINGER SURGERY      FRACTURE SURGERY Right     index finger    TONSILLECTOMY       Family History   Problem Relation Age of Onset    Stroke Mother     Heart disease Mother      CABG    Heart disease Father      CABG    No Known Problems Sister     No Known Problems Brother            Review of Systems  General: negative for chills, fever or weight loss  Psychological: negative for mood changes or depression  Ophthalmic: negative for blurry vision, photophobia or eye pain  ENT: see HPI  Respiratory: no cough, shortness of breath, or wheezing  Cardiovascular: no chest pain or dyspnea on exertion  Gastrointestinal: no abdominal pain, change in bowel habits, or black/ bloody stools  Musculoskeletal: negative for gait disturbance or muscular weakness  Neurological: no syncope or seizures; no ataxia  Dermatological: negative for puritis,  rash and jaundice  Hematologic/lymphatic: no easy bruising, no new lumps or bumps      Physical Exam:    Vitals:    03/23/18 1444   BP: 119/62   Pulse: 97   Temp: 99.8 °F (37.7 °C)       Constitutional: Well appearing / communicating without difficutly.  NAD.  Eyes: EOM I Bilaterally  Head/Face: Normocephalic.  Negative paranasal sinus pressure/tenderness.  Salivary glands WNL.  House Brackmann I Bilaterally.    Right Ear: Auricle normal appearance. External Auditory Canal within normal limits no lesions or masses,TM w/o masses/lesions/perforations. TM mobility noted.   Left Ear: Auricle normal appearance. External Auditory Canal within normal limits no lesions or masses,TM w/o masses/lesions/perforations. TM mobility noted.  Nose: No gross nasal septal deviation. Inferior Turbinates 3+ bilaterally. No septal perforation. No masses/lesions. External nasal skin appears normal without masses/lesions. Dry mucosa present bilateral  nares.  Oral Cavity: Gingiva/lips within normal limits.  Dentition/gingiva healthy appearing. Mucus membranes moist. Floor of mouth soft, no masses palpated. Oral Tongue mobile. Hard Palate appears normal.    Oropharynx: Base of tongue appears normal. No masses/lesions noted. Tonsillar fossa/pharyngeal wall without lesions. Posterior oropharynx WNL.  Soft palate without masses. Midline uvula.   Neck/Lymphatic: No LAD I-VI bilaterally.  No thyromegaly.  No masses noted on exam.    Mirror laryngoscopy/nasopharyngoscopy: Active gag reflex.  Unable to perform.    Neuro/Psychiatric: AOx3.  Normal mood and affect.   Cardiovascular: Normal carotid pulses bilaterally, no increasing jugular venous distention noted at cervical region bilaterally.    Respiratory: Normal respiratory effort, no stridor, no retractions noted.      See separate procedure note for FFL.     Chest CT 2/22/2018: Multiple nodules and micronodular clusters seen throughout the right lung. Largest focal opacity is within the posterior segment of the right upper lobe measuring 1.0 cm (axial series 3 image 147).  Pattern is most consistent with small airway infectious versus noninfectious inflammation; however, underlying malignancy cannot be entirely excluded.  As per Fleischner Society guidelines, recommend CT surveillance at 3-6 months, then consider CT in 18-24 months.    Trace dependent pleural fluid bilaterally. Mild interlobular septal thickening consistent with interstitial edema.    3/4/2018: There is cardiomegaly. Lungs are clear. Bones show DJD. There is a small right pleural effusion. Lungs have improved.      Assessment:    ICD-10-CM ICD-9-CM    1. Chronic cough R05 786.2    2. Chronic non-seasonal allergic rhinitis, unspecified trigger J30.89 477.9    3. Pulmonary nodules R91.8 793.19    4. Endocarditis of mitral valve I05.8 394.9    5. Mitral and aortic valve regurgitation I08.0 396.3      The primary encounter diagnosis was Chronic cough.  Diagnoses of Chronic non-seasonal allergic rhinitis, unspecified trigger, Pulmonary nodules, Endocarditis of mitral valve, and Mitral and aortic valve regurgitation were also pertinent to this visit.      Plan:  No orders of the defined types were placed in this encounter.    I do not think his cough is related to the upper airway as I see little to no upper airway secretions/congestion. I see no evidence of LPR other laryngeal inflammatory processes.    Recommend trial of benadryl at night. Continue Tessalon Perrles.  Continue Cheratussin AC prn.  Recommend follow up with his PCP Dr. Herrera ?Volume overload as possible etiology or other cardiopulmonary source for the cough.       Pearl Borrego MD

## 2018-03-25 ENCOUNTER — HOSPITAL ENCOUNTER (OUTPATIENT)
Facility: HOSPITAL | Age: 68
Discharge: HOME HEALTH | DRG: 193 | End: 2018-03-28
Attending: EMERGENCY MEDICINE | Admitting: HOSPITALIST
Payer: MEDICARE

## 2018-03-25 DIAGNOSIS — R60.9 EDEMA: ICD-10-CM

## 2018-03-25 DIAGNOSIS — J69.0 ASPIRATION PNEUMONIA, UNSPECIFIED ASPIRATION PNEUMONIA TYPE, UNSPECIFIED LATERALITY, UNSPECIFIED PART OF LUNG: ICD-10-CM

## 2018-03-25 DIAGNOSIS — K31.89 EROSIVE GASTROPATHY: ICD-10-CM

## 2018-03-25 DIAGNOSIS — I38 ENDOCARDITIS: ICD-10-CM

## 2018-03-25 DIAGNOSIS — R05.9 COUGH: ICD-10-CM

## 2018-03-25 DIAGNOSIS — I33.0 SUBACUTE BACTERIAL ENDOCARDITIS: ICD-10-CM

## 2018-03-25 DIAGNOSIS — R05.3 CHRONIC COUGH: ICD-10-CM

## 2018-03-25 DIAGNOSIS — J18.9 HOSPITAL-ACQUIRED PNEUMONIA: Primary | ICD-10-CM
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
## 2018-03-25 DIAGNOSIS — R60.9 EDEMA, UNSPECIFIED TYPE: ICD-10-CM

## 2018-03-25 DIAGNOSIS — I05.9 ENDOCARDITIS OF MITRAL VALVE: ICD-10-CM

## 2018-03-25 PROBLEM — R60.0 LOWER EXTREMITY EDEMA: Status: ACTIVE | Noted: 2018-03-25

## 2018-03-25 LAB
ABO + RH BLD: NORMAL
ALBUMIN SERPL BCP-MCNC: 2.6 G/DL
ALP SERPL-CCNC: 66 U/L
ALT SERPL W/O P-5'-P-CCNC: 20 U/L
ANION GAP SERPL CALC-SCNC: 12 MMOL/L
AST SERPL-CCNC: 27 U/L
BACTERIA #/AREA URNS AUTO: ABNORMAL /HPF
BASOPHILS # BLD AUTO: 0.03 K/UL
BASOPHILS NFR BLD: 0.4 %
BILIRUB SERPL-MCNC: 0.5 MG/DL
BILIRUB UR QL STRIP: NEGATIVE
BLD GP AB SCN CELLS X3 SERPL QL: NORMAL
BNP SERPL-MCNC: 184 PG/ML
BUN SERPL-MCNC: 18 MG/DL
CALCIUM SERPL-MCNC: 9.1 MG/DL
CHLORIDE SERPL-SCNC: 104 MMOL/L
CLARITY UR REFRACT.AUTO: CLEAR
CO2 SERPL-SCNC: 23 MMOL/L
COLOR UR AUTO: ABNORMAL
CREAT SERPL-MCNC: 1.4 MG/DL
D DIMER PPP IA.FEU-MCNC: 1.45 MG/L FEU
DIFFERENTIAL METHOD: ABNORMAL
EOSINOPHIL # BLD AUTO: 0.5 K/UL
EOSINOPHIL NFR BLD: 5.3 %
ERYTHROCYTE [DISTWIDTH] IN BLOOD BY AUTOMATED COUNT: 18.3 %
EST. GFR  (AFRICAN AMERICAN): 59.7 ML/MIN/1.73 M^2
EST. GFR  (NON AFRICAN AMERICAN): 51.6 ML/MIN/1.73 M^2
FERRITIN SERPL-MCNC: 836 NG/ML
GLUCOSE SERPL-MCNC: 107 MG/DL
GLUCOSE UR QL STRIP: NEGATIVE
HCT VFR BLD AUTO: 23.7 %
HGB BLD-MCNC: 7.5 G/DL
HGB UR QL STRIP: ABNORMAL
IMM GRANULOCYTES # BLD AUTO: 0.02 K/UL
IMM GRANULOCYTES NFR BLD AUTO: 0.2 %
IRON SERPL-MCNC: 21 UG/DL
KETONES UR QL STRIP: NEGATIVE
LEUKOCYTE ESTERASE UR QL STRIP: NEGATIVE
LYMPHOCYTES # BLD AUTO: 0.7 K/UL
LYMPHOCYTES NFR BLD: 7.9 %
MAGNESIUM SERPL-MCNC: 2 MG/DL
MCH RBC QN AUTO: 29.1 PG
MCHC RBC AUTO-ENTMCNC: 31.6 G/DL
MCV RBC AUTO: 92 FL
MICROSCOPIC COMMENT: ABNORMAL
MONOCYTES # BLD AUTO: 0.6 K/UL
MONOCYTES NFR BLD: 7 %
NEUTROPHILS # BLD AUTO: 6.7 K/UL
NEUTROPHILS NFR BLD: 79.2 %
NITRITE UR QL STRIP: NEGATIVE
NRBC BLD-RTO: 0 /100 WBC
PH UR STRIP: 7 [PH] (ref 5–8)
PLATELET # BLD AUTO: 226 K/UL
PMV BLD AUTO: 10.4 FL
POTASSIUM SERPL-SCNC: 4.4 MMOL/L
PROCALCITONIN SERPL IA-MCNC: 0.15 NG/ML
PROCALCITONIN SERPL IA-MCNC: 0.15 NG/ML
PROT SERPL-MCNC: 6.9 G/DL
PROT UR QL STRIP: NEGATIVE
RBC # BLD AUTO: 2.58 M/UL
RBC #/AREA URNS AUTO: 12 /HPF (ref 0–4)
SATURATED IRON: 14 %
SODIUM SERPL-SCNC: 139 MMOL/L
SP GR UR STRIP: 1 (ref 1–1.03)
SQUAMOUS #/AREA URNS AUTO: 0 /HPF
TOTAL IRON BINDING CAPACITY: 145 UG/DL
TRANSFERRIN SERPL-MCNC: 98 MG/DL
TROPONIN I SERPL DL<=0.01 NG/ML-MCNC: 0.03 NG/ML
URN SPEC COLLECT METH UR: ABNORMAL
UROBILINOGEN UR STRIP-ACNC: NEGATIVE EU/DL
WBC # BLD AUTO: 8.45 K/UL
WBC #/AREA URNS AUTO: 0 /HPF (ref 0–5)

## 2018-03-25 PROCEDURE — 86850 RBC ANTIBODY SCREEN: CPT

## 2018-03-25 PROCEDURE — 84145 PROCALCITONIN (PCT): CPT

## 2018-03-25 PROCEDURE — 63600175 PHARM REV CODE 636 W HCPCS: Performed by: PAIN MEDICINE

## 2018-03-25 PROCEDURE — 82728 ASSAY OF FERRITIN: CPT

## 2018-03-25 PROCEDURE — 96365 THER/PROPH/DIAG IV INF INIT: CPT

## 2018-03-25 PROCEDURE — 93010 ELECTROCARDIOGRAM REPORT: CPT | Mod: ,,, | Performed by: INTERNAL MEDICINE

## 2018-03-25 PROCEDURE — 84145 PROCALCITONIN (PCT): CPT | Mod: 91

## 2018-03-25 PROCEDURE — 80053 COMPREHEN METABOLIC PANEL: CPT

## 2018-03-25 PROCEDURE — G0378 HOSPITAL OBSERVATION PER HR: HCPCS

## 2018-03-25 PROCEDURE — 11000001 HC ACUTE MED/SURG PRIVATE ROOM

## 2018-03-25 PROCEDURE — 84484 ASSAY OF TROPONIN QUANT: CPT

## 2018-03-25 PROCEDURE — 25000003 PHARM REV CODE 250: Performed by: STUDENT IN AN ORGANIZED HEALTH CARE EDUCATION/TRAINING PROGRAM

## 2018-03-25 PROCEDURE — 96366 THER/PROPH/DIAG IV INF ADDON: CPT

## 2018-03-25 PROCEDURE — 83540 ASSAY OF IRON: CPT

## 2018-03-25 PROCEDURE — 25000003 PHARM REV CODE 250: Performed by: INTERNAL MEDICINE

## 2018-03-25 PROCEDURE — 85025 COMPLETE CBC W/AUTO DIFF WBC: CPT

## 2018-03-25 PROCEDURE — 99285 EMERGENCY DEPT VISIT HI MDM: CPT | Mod: 25

## 2018-03-25 PROCEDURE — 81001 URINALYSIS AUTO W/SCOPE: CPT

## 2018-03-25 PROCEDURE — 83880 ASSAY OF NATRIURETIC PEPTIDE: CPT

## 2018-03-25 PROCEDURE — 25500020 PHARM REV CODE 255: Performed by: HOSPITALIST

## 2018-03-25 PROCEDURE — 63600175 PHARM REV CODE 636 W HCPCS: Performed by: STUDENT IN AN ORGANIZED HEALTH CARE EDUCATION/TRAINING PROGRAM

## 2018-03-25 PROCEDURE — 85379 FIBRIN DEGRADATION QUANT: CPT

## 2018-03-25 PROCEDURE — 83735 ASSAY OF MAGNESIUM: CPT

## 2018-03-25 PROCEDURE — 93005 ELECTROCARDIOGRAM TRACING: CPT

## 2018-03-25 PROCEDURE — 25000003 PHARM REV CODE 250: Performed by: PAIN MEDICINE

## 2018-03-25 RX ORDER — CETIRIZINE HYDROCHLORIDE 10 MG/1
10 TABLET ORAL DAILY
Status: DISCONTINUED | OUTPATIENT
Start: 2018-03-26 | End: 2018-03-28 | Stop reason: HOSPADM

## 2018-03-25 RX ORDER — RAMELTEON 8 MG/1
8 TABLET ORAL NIGHTLY
Status: DISCONTINUED | OUTPATIENT
Start: 2018-03-25 | End: 2018-03-28 | Stop reason: HOSPADM

## 2018-03-25 RX ORDER — FUROSEMIDE 40 MG/1
40 TABLET ORAL DAILY
Status: DISCONTINUED | OUTPATIENT
Start: 2018-03-26 | End: 2018-03-28 | Stop reason: HOSPADM

## 2018-03-25 RX ORDER — HYDRALAZINE HYDROCHLORIDE 25 MG/1
25 TABLET, FILM COATED ORAL EVERY 8 HOURS PRN
Status: DISCONTINUED | OUTPATIENT
Start: 2018-03-25 | End: 2018-03-28 | Stop reason: HOSPADM

## 2018-03-25 RX ORDER — ONDANSETRON 8 MG/1
8 TABLET, ORALLY DISINTEGRATING ORAL EVERY 8 HOURS PRN
Status: DISCONTINUED | OUTPATIENT
Start: 2018-03-25 | End: 2018-03-28 | Stop reason: HOSPADM

## 2018-03-25 RX ORDER — MIRTAZAPINE 15 MG/1
30 TABLET, FILM COATED ORAL NIGHTLY
Status: DISCONTINUED | OUTPATIENT
Start: 2018-03-25 | End: 2018-03-28 | Stop reason: HOSPADM

## 2018-03-25 RX ORDER — TAMSULOSIN HYDROCHLORIDE 0.4 MG/1
0.8 CAPSULE ORAL DAILY
Status: DISCONTINUED | OUTPATIENT
Start: 2018-03-26 | End: 2018-03-28 | Stop reason: HOSPADM

## 2018-03-25 RX ORDER — SODIUM CHLORIDE 0.9 % (FLUSH) 0.9 %
3 SYRINGE (ML) INJECTION
Status: DISCONTINUED | OUTPATIENT
Start: 2018-03-25 | End: 2018-03-28 | Stop reason: HOSPADM

## 2018-03-25 RX ORDER — ACETAMINOPHEN 325 MG/1
650 TABLET ORAL EVERY 4 HOURS PRN
Status: DISCONTINUED | OUTPATIENT
Start: 2018-03-25 | End: 2018-03-28 | Stop reason: HOSPADM

## 2018-03-25 RX ORDER — BENZONATATE 100 MG/1
100 CAPSULE ORAL 3 TIMES DAILY PRN
Status: DISCONTINUED | OUTPATIENT
Start: 2018-03-25 | End: 2018-03-26

## 2018-03-25 RX ORDER — PROMETHAZINE HYDROCHLORIDE AND CODEINE PHOSPHATE 6.25; 1 MG/5ML; MG/5ML
5 SOLUTION ORAL EVERY 4 HOURS PRN
Status: DISCONTINUED | OUTPATIENT
Start: 2018-03-25 | End: 2018-03-26

## 2018-03-25 RX ORDER — GUAIFENESIN/DEXTROMETHORPHAN 100-10MG/5
5 SYRUP ORAL EVERY 4 HOURS PRN
Status: DISCONTINUED | OUTPATIENT
Start: 2018-03-25 | End: 2018-03-26

## 2018-03-25 RX ADMIN — RAMELTEON 8 MG: 8 TABLET, FILM COATED ORAL at 09:03

## 2018-03-25 RX ADMIN — PROMETHAZINE HYDROCHLORIDE AND CODEINE PHOSPHATE 5 ML: 10; 6.25 SOLUTION ORAL at 05:03

## 2018-03-25 RX ADMIN — CEFTRIAXONE SODIUM 2 G: 2 INJECTION, POWDER, FOR SOLUTION INTRAMUSCULAR; INTRAVENOUS at 05:03

## 2018-03-25 RX ADMIN — AMPICILLIN SODIUM 2 G: 2 INJECTION, POWDER, FOR SOLUTION INTRAMUSCULAR; INTRAVENOUS at 09:03

## 2018-03-25 RX ADMIN — VANCOMYCIN HYDROCHLORIDE 1500 MG: 10 INJECTION, POWDER, LYOPHILIZED, FOR SOLUTION INTRAVENOUS at 12:03

## 2018-03-25 RX ADMIN — MIRTAZAPINE 30 MG: 15 TABLET, FILM COATED ORAL at 09:03

## 2018-03-25 RX ADMIN — BENZONATATE 100 MG: 100 CAPSULE ORAL at 09:03

## 2018-03-25 RX ADMIN — PROMETHAZINE HYDROCHLORIDE AND CODEINE PHOSPHATE 5 ML: 10; 6.25 SOLUTION ORAL at 09:03

## 2018-03-25 RX ADMIN — IOHEXOL 75 ML: 350 INJECTION, SOLUTION INTRAVENOUS at 11:03

## 2018-03-25 NOTE — ED NOTES
Patient lying in the bed.  Patient wife is at the bedside. Admitting Doctors at the bedside.  Patient has returned from ultrasound and is resting comfortable. Patient is on O2 @ 2L per nasal cannula.  Patient is having no complaints at this time.  Patients breathing is spontaneous and non labored.

## 2018-03-25 NOTE — SUBJECTIVE & OBJECTIVE
Past Medical History:   Diagnosis Date    ANNA (acute kidney injury) 2/22/2018    Anemia     Anxiety     BPH (benign prostatic hyperplasia)     Diverticulosis     Hypertension     Urinary retention        Past Surgical History:   Procedure Laterality Date    APPENDECTOMY      COLONOSCOPY      COLONOSCOPY N/A 2/1/2018    Procedure: COLONOSCOPY;  Surgeon: Richar Payan MD;  Location: [address]);  Service: Endoscopy;  Laterality: N/A;  PM prep    EYE SURGERY Right     cataract extraction    FINGER SURGERY      FRACTURE SURGERY Right     index finger    TONSILLECTOMY         Review of patient's allergies indicates:   Allergen Reactions    Augmentin [amoxicillin-pot clavulanate]      Patient felt that it raised BP and requested to have it added as intolerance. Tolerated unasyn    Ciprofloxacin     Flagyl [metronidazole]     Lisinopril Other (See Comments)     cough    Mysoline [primidone]     Omnicef [cefdinir]     Shellfish containing products        No current facility-administered medications on file prior to encounter.      Current Outpatient Prescriptions on File Prior to Encounter   Medication Sig    albuterol-ipratropium 2.5mg-0.5mg/3mL (DUO-NEB) 0.5 mg-3 mg(2.5 mg base)/3 mL nebulizer solution 3 mLs every 4 (four) hours as needed.     amLODIPine (NORVASC) 10 MG tablet Take 1 tablet (10 mg total) by mouth once daily.    AMPICILLIN SODIUM (AMPICILLIN 2 G/100 ML NS, READY TO MIX SYSTEM,) Inject 100 mLs (2 g total) into the vein every 4 (four) hours.    benzonatate (TESSALON) 100 MG capsule Take 100 mg by mouth 3 (three) times daily as needed for Cough.    cefTRIAXone 2 g in dextrose 5 % 50 mL (ROCEPHIN) 2 g/50 mL PgBk IVPB Inject 50 mLs (2 g total) into the vein every 12 (twelve) hours.    CHERATUSSIN AC  mg/5 mL syrup Take 10 mLs by mouth every 6 (six) hours as needed.    furosemide (LASIX) 40 MG tablet Take 1 tablet (40 mg total) by mouth once daily.     guaifenesin-codeine 100-10 mg/5 ml (CHERATUSSIN AC)  mg/5 mL syrup Take 5 mLs by mouth 3 (three) times daily as needed.    loratadine (CLARITIN) 10 mg tablet Take 10 mg by mouth once daily.    mirtazapine (REMERON) 30 MG tablet Take 1 tablet (30 mg total) by mouth every evening.    tamsulosin (FLOMAX) 0.4 mg Cp24 Take 2 capsules (0.8 mg total) by mouth 2 (two) times daily.    VENTOLIN HFA 90 mcg/actuation inhaler     [DISCONTINUED] fluticasone (FLONASE) 50 mcg/actuation nasal spray 1 spray (50 mcg total) by Each Nare route 2 (two) times daily.    [DISCONTINUED] hydrocodone-acetaminophen 5-325mg (NORCO) 5-325 mg per tablet Take 1 tablet by mouth every 6 (six) hours as needed for Pain.     Family History     Problem Relation (Age of Onset)    Heart disease Mother, Father    No Known Problems Sister, Brother    Stroke Mother        Social History Main Topics    Smoking status: Never Smoker    Smokeless tobacco: Never Used    Alcohol use Yes      Comment: none recently    Drug use: No    Sexual activity: Not Currently     Partners: Female     Review of Systems   Constitutional: Positive for fatigue and fever (subjective). Negative for appetite change, chills, diaphoresis and unexpected weight change.   HENT: Negative for congestion, sore throat and trouble swallowing.    Eyes: Negative for photophobia and visual disturbance.   Respiratory: Positive for cough and chest tightness. Negative for shortness of breath.    Cardiovascular: Positive for leg swelling. Negative for chest pain and palpitations.   Gastrointestinal: Negative for abdominal pain, blood in stool, constipation, diarrhea, nausea and vomiting.   Genitourinary: Negative for decreased urine volume and difficulty urinating.   Musculoskeletal: Negative for arthralgias and back pain.   Skin: Negative for color change and pallor.   Neurological: Negative for dizziness, light-headedness and headaches.   Psychiatric/Behavioral: Negative for  agitation, behavioral problems and confusion.     Objective:     Vital Signs (Most Recent):  Temp: 98.7 °F (37.1 °C) (03/25/18 1500)  Pulse: 98 (03/25/18 1536)  Resp: 18 (03/25/18 1536)  BP: (!) 147/67 (03/25/18 1536)  SpO2: 95 % (03/25/18 1536) Vital Signs (24h Range):  Temp:  [98.6 °F (37 °C)-98.8 °F (37.1 °C)] 98.7 °F (37.1 °C)  Pulse:  [] 98  Resp:  [15-26] 18  SpO2:  [95 %-99 %] 95 %  BP: (135-147)/(66-74) 147/67     Weight: 72.6 kg (160 lb)  Body mass index is 23.63 kg/m².    Physical Exam   Constitutional: He is oriented to person, place, and time. He appears well-developed and well-nourished. No distress.   HENT:   Head: Normocephalic and atraumatic.   Eyes: Conjunctivae are normal.   Neck: Normal range of motion. Neck supple.   Cardiovascular: Normal rate, regular rhythm, normal heart sounds and intact distal pulses.    Pulmonary/Chest: Effort normal. No respiratory distress. He has rales (right middle to lower lung zones).   Abdominal: Soft. He exhibits no distension. There is no tenderness.   Musculoskeletal: He exhibits edema (1+ pitting edema just above ankles b/l).   Neurological: He is alert and oriented to person, place, and time.   Skin: There is pallor.   Psychiatric: He has a normal mood and affect. His behavior is normal.        Significant Labs:  Recent Results (from the past 24 hour(s))   Comprehensive metabolic panel    Collection Time: 03/25/18 11:37 AM   Result Value Ref Range    Sodium 139 136 - 145 mmol/L    Potassium 4.4 3.5 - 5.1 mmol/L    Chloride 104 95 - 110 mmol/L    CO2 23 23 - 29 mmol/L    Glucose 107 70 - 110 mg/dL    BUN, Bld 18 8 - 23 mg/dL    Creatinine 1.4 0.5 - 1.4 mg/dL    Calcium 9.1 8.7 - 10.5 mg/dL    Total Protein 6.9 6.0 - 8.4 g/dL    Albumin 2.6 (L) 3.5 - 5.2 g/dL    Total Bilirubin 0.5 0.1 - 1.0 mg/dL    Alkaline Phosphatase 66 55 - 135 U/L    AST 27 10 - 40 U/L    ALT 20 10 - 44 U/L    Anion Gap 12 8 - 16 mmol/L    eGFR if African American 59.7 (A) >60  mL/min/1.73 m^2    eGFR if non  51.6 (A) >60 mL/min/1.73 m^2   CBC auto differential    Collection Time: 03/25/18 11:37 AM   Result Value Ref Range    WBC 8.45 3.90 - 12.70 K/uL    RBC 2.58 (L) 4.60 - 6.20 M/uL    Hemoglobin 7.5 (L) 14.0 - 18.0 g/dL    Hematocrit 23.7 (L) 40.0 - 54.0 %    MCV 92 82 - 98 fL    MCH 29.1 27.0 - 31.0 pg    MCHC 31.6 (L) 32.0 - 36.0 g/dL    RDW 18.3 (H) 11.5 - 14.5 %    Platelets 226 150 - 350 K/uL    MPV 10.4 9.2 - 12.9 fL    Immature Granulocytes 0.2 0.0 - 0.5 %    Gran # (ANC) 6.7 1.8 - 7.7 K/uL    Immature Grans (Abs) 0.02 0.00 - 0.04 K/uL    Lymph # 0.7 (L) 1.0 - 4.8 K/uL    Mono # 0.6 0.3 - 1.0 K/uL    Eos # 0.5 0.0 - 0.5 K/uL    Baso # 0.03 0.00 - 0.20 K/uL    nRBC 0 0 /100 WBC    Gran% 79.2 (H) 38.0 - 73.0 %    Lymph% 7.9 (L) 18.0 - 48.0 %    Mono% 7.0 4.0 - 15.0 %    Eosinophil% 5.3 0.0 - 8.0 %    Basophil% 0.4 0.0 - 1.9 %    Differential Method Automated    Magnesium    Collection Time: 03/25/18 11:37 AM   Result Value Ref Range    Magnesium 2.0 1.6 - 2.6 mg/dL   Troponin I    Collection Time: 03/25/18 11:37 AM   Result Value Ref Range    Troponin I 0.026 0.000 - 0.026 ng/mL   Brain natriuretic peptide    Collection Time: 03/25/18 11:37 AM   Result Value Ref Range     (H) 0 - 99 pg/mL   D dimer, quantitative    Collection Time: 03/25/18 11:37 AM   Result Value Ref Range    D-Dimer 1.45 (H) <0.50 mg/L FEU   Procalcitonin    Collection Time: 03/25/18 11:37 AM   Result Value Ref Range    Procalcitonin 0.15 <0.25 ng/mL   Iron and TIBC    Collection Time: 03/25/18 11:37 AM   Result Value Ref Range    Iron 21 (L) 45 - 160 ug/dL    Transferrin 98 (L) 200 - 375 mg/dL    TIBC 145 (L) 250 - 450 ug/dL    Saturated Iron 14 (L) 20 - 50 %   Ferritin    Collection Time: 03/25/18 11:37 AM   Result Value Ref Range    Ferritin 836 (H) 20.0 - 300.0 ng/mL   Type & Screen    Collection Time: 03/25/18  2:22 PM   Result Value Ref Range    Group & Rh A POS     Indirect Juan Alberto  NEG    Procalcitonin    Collection Time: 03/25/18  2:22 PM   Result Value Ref Range    Procalcitonin 0.15 <0.25 ng/mL   Urinalysis, Reflex to Urine Culture Urine, Clean Catch    Collection Time: 03/25/18  3:27 PM   Result Value Ref Range    Specimen UA Urine, Clean Catch     Color, UA Straw Yellow, Straw, Kesha    Appearance, UA Clear Clear    pH, UA 7.0 5.0 - 8.0    Specific Gravity, UA 1.005 1.005 - 1.030    Protein, UA Negative Negative    Glucose, UA Negative Negative    Ketones, UA Negative Negative    Bilirubin (UA) Negative Negative    Occult Blood UA 2+ (A) Negative    Nitrite, UA Negative Negative    Urobilinogen, UA Negative <2.0 EU/dL    Leukocytes, UA Negative Negative   Urinalysis Microscopic    Collection Time: 03/25/18  3:27 PM   Result Value Ref Range    RBC, UA 12 (H) 0 - 4 /hpf    WBC, UA 0 0 - 5 /hpf    Bacteria, UA Rare None-Occ /hpf    Squam Epithel, UA 0 /hpf    Microscopic Comment SEE COMMENT          Significant Imaging:    CXR  Patchy consolidation projected over the right mid and lower lung zones concerning for infection, clinical correlation advised, follow-up recommended to ensure resolution.

## 2018-03-25 NOTE — ED NOTES
LOC: The patient is awake, alert and aware of environment with a flat affect, the patient is oriented x 3 and speaking appropriately. Pt is depressed and tearful due to his chronic illness.   APPEARANCE: Patient resting comfortably and in no acute distress, patient is clean and well groomed. Pt is thin and reports recent weight.   SKIN: The skin is warm and dry, patient has normal skin turgor and moist mucus membranes, skin intact, no breakdown or brusing noted.  MUSKULOSKELETAL: Patient moving all extremities well, no obvious swelling or deformities noted. PICC line noted to the right upper arm with dressing that is CDI. Dressing was recently changed by home health and they did not place a biopatch or similar product.   RESPIRATORY: Airway is open and patent, respirations are spontaneous, patient has a normal effort and rate. Breath sounds are clear and equal bilaterally. Pt reports chronic, non-productive cough.   CARDIAC:  Bilateral lower extremity pitting edema noted with the left being more swollen than the right. Dorsalis pedis and posterior tibial pulses are palpable. Radial pulses are strong. Sinus tachycardia noted on the monitor.   ABDOMEN: Soft and non tender to palpation, no distention noted. Bowel sounds present.  NEURO: No neuro deficits, hand grasp equal, no drift noted, no facial droop noted. Speech is clear.

## 2018-03-25 NOTE — ASSESSMENT & PLAN NOTE
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  - Used to take lisinopril, which was discontinued, but no improvement in cough   - 2/22 CT chest significant for multiple pulmonary nodules <1cm scattered throughout  - Saw pulmonary on 3/16 who recommend repeat CT chest in 3 months  - Sputum culture  - 3/25 CXR show patchy consolidation over the right mid and lower lung zones concerning for infection  - Procal normal 0.15, pt afebrile, and no leukocytosis so unclear whether this is a pneumonia or maybe septic emboli?  - Repeat CT with RLL and RML consolidations and air space disease.   - Pulmonary consulted for thoracostomy   - MOHAN hose for lower limb edema.

## 2018-03-25 NOTE — ED PROVIDER NOTES
Encounter Date: 3/25/2018    SCRIBE #1 NOTE: I, Edi Taylor, am scribing for, and in the presence of,  Dr. Groves. I have scribed the following portions of the note -       History     Chief Complaint   Patient presents with    Edema     on iv antibiotics, has infection on valves of heart     HPI   The patient is a 67-year-old male with past medical history of hypertension, recent diagnosis of endocarditis, moderate to severe AR and severe MR, and anemia who presents to the emergency room with complaints of 5 days of lower extremity swelling.  The patient was just recently discharged on March 6.  His admission was complicated by an ICU admission secondary to acute respiratory failure requiring comfort flow oxygen.  He was discharged with 6 weeks of antibiotics for endocarditis treatment.  His acute respiratory failure was secondary to cardiogenic pulmonary edema.  His admission was also complicated by an acute kidney injury.  He was also found to be anemic and an EGD and C scope were performed without a source of bleeding however he did receive 2 units of PRBCs throughout his admission.    The patient presents today with 5 days of bilateral lower extremity edema.  The patient cannot identify an inciting event.  The patient states he called his primary care physician earlier this week on Wednesday and since has been taking double his regular dose of Lasix.  The patient states the lower extremity edema has been getting worse.  There is no lower extremity pain associated with these symptoms.  Otherwise, review systems negative besides a chronic cough which has been present since December. Of note, pt does not wear home O2 and currently in low 90's O2 sats on room air.     Review of patient's allergies indicates:   Allergen Reactions    Augmentin [amoxicillin-pot clavulanate]      Patient felt that it raised BP and requested to have it added as intolerance. Tolerated unasyn    Ciprofloxacin     Flagyl  [metronidazole]     Lisinopril Other (See Comments)     cough    Mysoline [primidone]     Omnicef [cefdinir]     Shellfish containing products      Past Medical History:   Diagnosis Date    ANNA (acute kidney injury) 2/22/2018    Anemia     Anxiety     BPH (benign prostatic hyperplasia)     Diverticulosis     Hypertension     Urinary retention      Past Surgical History:   Procedure Laterality Date    APPENDECTOMY      COLONOSCOPY      COLONOSCOPY N/A 2/1/2018    Procedure: COLONOSCOPY;  Surgeon: Richar Payan MD;  Location: [address];  Service: Endoscopy;  Laterality: N/A;  PM prep    EYE SURGERY Right     cataract extraction    FINGER SURGERY      FRACTURE SURGERY Right     index finger    TONSILLECTOMY       Family History   Problem Relation Age of Onset    Stroke Mother     Heart disease Mother      CABG    Heart disease Father      CABG    No Known Problems Sister     No Known Problems Brother      Social History   Substance Use Topics    Smoking status: Never Smoker    Smokeless tobacco: Never Used    Alcohol use Yes      Comment: none recently     Review of Systems   Constitutional: Negative.    HENT: Negative.    Eyes: Negative.    Respiratory: Positive for cough. Negative for choking, chest tightness, shortness of breath, wheezing and stridor.    Cardiovascular: Positive for leg swelling. Negative for chest pain and palpitations.   Gastrointestinal: Negative.    Endocrine: Negative.    Genitourinary: Negative.    Musculoskeletal: Negative.    Skin: Negative.    Neurological: Negative.    Hematological: Negative.    Psychiatric/Behavioral: Negative.        Physical Exam     Initial Vitals [03/25/18 1054]   BP Pulse Resp Temp SpO2   138/66 102 20 98.6 °F (37 °C) 96 %      MAP       90         Physical Exam    Constitutional: He appears well-developed and well-nourished. He is not diaphoretic. No distress.   HENT:   Head: Normocephalic and atraumatic.   Mouth/Throat:  Oropharynx is clear and moist. No oropharyngeal exudate.   Eyes: EOM are normal. Pupils are equal, round, and reactive to light. No scleral icterus.   Neck: Normal range of motion. No thyromegaly present. No tracheal deviation present. No JVD present.   Cardiovascular: Normal rate, regular rhythm and intact distal pulses. Exam reveals no gallop and no friction rub.    Murmur heard.  Pulmonary/Chest: No stridor. He has no wheezes. He has no rhonchi. He has rales. He exhibits no tenderness.   Faint bibasilar crackles    Abdominal: Soft. Bowel sounds are normal. He exhibits no distension and no mass. There is no tenderness. There is no rebound and no guarding.   Musculoskeletal: Normal range of motion. He exhibits edema. He exhibits no tenderness.   2+ LE edema to the knees    Lymphadenopathy:     He has no cervical adenopathy.   Neurological: He is alert and oriented to person, place, and time. He has normal strength. He displays normal reflexes. No cranial nerve deficit or sensory deficit.   Skin: Skin is warm.   Psychiatric: He has a normal mood and affect. Thought content normal.         ED Course   Procedures  Labs Reviewed   COMPREHENSIVE METABOLIC PANEL - Abnormal; Notable for the following:        Result Value    Albumin 2.6 (*)     eGFR if  59.7 (*)     eGFR if non  51.6 (*)     All other components within normal limits   CBC W/ AUTO DIFFERENTIAL - Abnormal; Notable for the following:     RBC 2.58 (*)     Hemoglobin 7.5 (*)     Hematocrit 23.7 (*)     MCHC 31.6 (*)     RDW 18.3 (*)     Lymph # 0.7 (*)     Gran% 79.2 (*)     Lymph% 7.9 (*)     All other components within normal limits   B-TYPE NATRIURETIC PEPTIDE - Abnormal; Notable for the following:      (*)     All other components within normal limits    Narrative:     add on [facility] order #[id number] per Inocencio Ryan MD @  03/25/2018    12:41    D DIMER, QUANTITATIVE - Abnormal; Notable for the following:     D-Dimer  1.45 (*)     All other components within normal limits    Narrative:     add on [facility] order #[id number] per Inocencio Ryan MD @  03/25/2018    12:41    MAGNESIUM   TROPONIN I   D DIMER, QUANTITATIVE   PROCALCITONIN             Medical Decision Making:   History:   Old Medical Records: I decided to obtain old medical records.  Initial Assessment:   The patient is a 67-year-old male with past medical history per history of present illness who presents with 5 days of bilateral lower extremity swelling.  Differential Diagnosis:   Including but not limited to CHF exacerbation, MI, pulmonary embolism, DVT, complications of endocarditis, hypoalbuminemia, venous stasis, pneumonia    Clinical Tests:   Lab Tests: Ordered  Radiological Study: Ordered  Medical Tests: Ordered  ED Management:  Patient is here for lower extremity edema.  Discussed with resident.  He has no shortness of breath.  He has a new pneumonia on his x-ray.  We will admit this patient.  No DVT on ultrasound.  D-dimer is elevated.  Patient will be admitted.  Recent CT shows pulmonary nodules.  He does not have any shortness of breath today.       APC / Resident Notes:   11:33 AM  Case discussed with Dr. Groves. Pt HD stable. Plan to evaluate with lab workup including troponin, BNP, CBC, CMP, Mg and CXR. Add on D dimer and ordered BL LE US to evaluate for DVT. Pending D dimer and LE US will determine if CTA is appropriate.   Inocencio Ryan MD     1:39 PM  D Dimer elevated at 1.45. Trop WNL. . Hb 7.5. CXR with evidence of consolidation over right middle and lower lobes. Vancomycin ordered and deferring ceftriaxone as pt currently on this medication for treatment of endocarditis. Pt urinated at least 500 cc since presentation with home lasix being taken this AM. US LE still pending.   Inocencio Ryan MD     1:47 PM  LE US without evidence of DVT.   Inocencio Ryan MD     1:52 PM  Pt to be admitted to IM with Dr. Williamson.   Inocencio Ryan MD        Scribe  Attestation:   Scribe #1: I performed the above scribed service and the documentation accurately describes the services I performed. I attest to the accuracy of the note.    Attending Attestation:   Physician Attestation Statement for Resident:  As the supervising MD   Physician Attestation Statement: I have personally seen and examined this patient.   I agree with the above history. -:   As the supervising MD I agree with the above PE.    As the supervising MD I agree with the above treatment, course, plan, and disposition.  I have reviewed and agree with the residents interpretation of the following: lab data, EKG and x-rays.                       Clinical Impression:   The primary encounter diagnosis was Hospital-acquired pneumonia. Diagnoses of Edema and Cough were also pertinent to this visit.    Disposition:   Disposition: Admitted  Condition: Fair                        Yvette Groves MD  03/25/18 1551       Yvette Groves MD  03/25/18 1555

## 2018-03-25 NOTE — ED NOTES
The patient is awake, alert and cooperative with a calm affect, patient is aware of environment. Airway is open and patent, respirations are spontaneous, minimal coughing noted, skin warm and dry, moves all extremities well, appearance: no apparent distress noted. Discussed with patient and wife that report was called and transport ordered to transfer patient to 9th floor.

## 2018-03-25 NOTE — HPI
Mr. Reynolds is a 67 year old man with HTN and recently diagnosed with endocarditis, mod-severe AR, severe MR on SOWMYA (2/27), and anemia who presents to the ED for 5 days of lower extremity swelling.  Patient noticed lower extremity swelling that started this past Wednesday, for which he doubled his lasix dose (40mg qday to 40mg BID), but did not see any improvement.  He denies any pain to BLE, SOB or CP.  The Norvasc is a new medication since last discharge. He does report having a chronic, dry, non-productive cough since December that prevents him from laying flat because it worsens.  Noted to be relieved by chewing spearmint gum (witnessed during visit). 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  During his most recent admission, a CT chest found multiple nodules (<1cm) scattered throughout the lung. He was evaluated by pulmonology on 3/16 for this cough who recommended repeat CT in 3 months.  Reports fatigue but denies fevers, chills, appetite change, or weight loss.     Patient was recently hospitalized 2/21-3/6 for acute hypoxemic respiratory failure 2/2 cardiogenic edema and found to have endocarditis and enterococcus faecalis bacteremia.  During that admission, he had spent some time in the ICU, as well.  He was discharged with 6 weeks of antibiotics for endocarditis treatment. In January 2018, he was admitted for symptomatic anemia and had an EGD and C scope (2/1/18) that showed non bleeding erosive gastropathy, internal hemorrhoid and diverticulosis in sigmoid and descending colon.

## 2018-03-25 NOTE — HOSPITAL COURSE
Patient admitted to hospital medicine on 3/25/18 for BLE swelling and concern for pneumonia.  In the ED, patient afebrile, no leukocytosis, normal procalcitonin, but CXR showed patchy consolidation in right middle to lower lung field. BLE US showed no signs of DVT. Hb was 7.5 but patient without signs of GIB and VSS stable.  He was given one dose of Vanc in the ED, and then started back on his home antibiotic regimen of Ampicillin and Rocephin. CT of his chest revealed multifocal consolidation and airspace disease in the right lower and middle lobes w/ associated small to moderate right pleural effusion. Pulm was consulted and recommended starting IV vanc and zosyn despite negative procal, no leukocytosis, no fevers and no worsening of symptoms. Thoracentesis was unable to be preformed given too small of a pocket to safely tap. ID was consulted as a recommendation from pulm. They felt that this was less likely a pneumonia and recommended not to change antibiotics. There was concern regarding changes in vegetation locations on his new echo. This was discussed with cardiology who reports that despite the discrepant reads, the vegetations are actually similar appearing and that the patient would not benefit from a repeat SOWMYA. Mr. Messina underwent MBSS which was negative for aspiration and was also started on a PPI which seemed to provide moderate relief in his coughing symptoms. He was also given codeine cough syrup which also seemed to ease his coughing. In regards to his swelling, he was provided with MOHAN hose which alleviated this problem.     He will need to follow up with urology regarding urine cytology and hematuria as an outpatient.     ROS on day of discharge:  Cough improved. He denies any fevers, chills, SOB, CP, N/V, diarrhea, constipation, or dysuria.    PE on day of discharge:  Physical Exam   Constitutional: He is oriented to person, place, and time. He appears well-developed and well-nourished. No  distress.   HENT:   Head: Normocephalic and atraumatic.   Eyes: Conjunctivae are normal.   Neck: Normal range of motion. Neck supple.   Cardiovascular: Normal rate, regular rhythm, normal heart sounds and intact distal pulses.    Pulmonary/Chest: Effort normal. No respiratory distress. He has rales (right middle to lower lung zones)- improving  Abdominal: Soft. He exhibits no distension. There is no tenderness.   Musculoskeletal: He exhibits no edema  Neurological: He is alert and oriented to person, place, and time.   Skin: There is pallor.   Psychiatric: He has a normal mood and affect. His behavior is normal.

## 2018-03-25 NOTE — H&P
[facility] Hospital Medicine  History & Physical    Patient Name: Rodolfo Messina  MRN: [id number]  Admission Date: 3/25/2018  Attending Physician: Lilian Williamson MD   Primary Care Provider: Deric Claudio MD    [facility] Medicine Team: [facility] HOSP MED 4 Alley Voss MD     Patient information was obtained from patient, spouse/SO and ER records.     Subjective:     Principal Problem:<principal problem not specified>    Chief Complaint:   Chief Complaint   Patient presents with    Edema     on iv antibiotics, has infection on valves of heart        HPI: Mr. Reynolds is a 67 year old man with HTN and recently diagnosed with endocarditis, mod-severe AR, severe MR on SOWMYA (2/27), and anemia who presents to the ED for 5 days of lower extremity swelling.  Patient noticed lower extremity swelling that started this past Wednesday, for which he doubled his lasix dose (40mg qday to 40mg BID), but did not see any improvement.  He denies any pain to BLE, SOB or CP.  The Norvasc is a new medication since last discharge. He does report having a chronic, dry, non-productive cough since December that prevents him from laying flat because it worsens.  Noted to be relieved by chewing spearmint gum (witnessed during visit). 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  During his most recent admission, a CT chest found multiple nodules (<1cm) scattered throughout the lung. He was evaluated by pulmonology on 3/16 for this cough who recommended repeat CT in 3 months.  Reports fatigue but denies fevers, chills, appetite change, or weight loss.     Patient was recently hospitalized 2/21-3/6 for acute hypoxemic respiratory failure 2/2 cardiogenic edema and found to have endocarditis and enterococcus faecalis bacteremia.  During that admission, he had spent some time in the ICU, as well.  He was discharged with 6 weeks of antibiotics for endocarditis treatment.   In January 2018, he was admitted for symptomatic anemia and had an EGD and C scope (2/1/18) that showed non bleeding erosive gastropathy, internal hemorrhoid and diverticulosis in sigmoid and descending colon.           Past Medical History:   Diagnosis Date    ANNA (acute kidney injury) 2/22/2018    Anemia     Anxiety     BPH (benign prostatic hyperplasia)     Diverticulosis     Hypertension     Urinary retention        Past Surgical History:   Procedure Laterality Date    APPENDECTOMY      COLONOSCOPY      COLONOSCOPY N/A 2/1/2018    Procedure: COLONOSCOPY;  Surgeon: Richar Payan MD;  Location: [address];  Service: Endoscopy;  Laterality: N/A;  PM prep    EYE SURGERY Right     cataract extraction    FINGER SURGERY      FRACTURE SURGERY Right     index finger    TONSILLECTOMY         Review of patient's allergies indicates:   Allergen Reactions    Augmentin [amoxicillin-pot clavulanate]      Patient felt that it raised BP and requested to have it added as intolerance. Tolerated unasyn    Ciprofloxacin     Flagyl [metronidazole]     Lisinopril Other (See Comments)     cough    Mysoline [primidone]     Omnicef [cefdinir]     Shellfish containing products        No current facility-administered medications on file prior to encounter.      Current Outpatient Prescriptions on File Prior to Encounter   Medication Sig    albuterol-ipratropium 2.5mg-0.5mg/3mL (DUO-NEB) 0.5 mg-3 mg(2.5 mg base)/3 mL nebulizer solution 3 mLs every 4 (four) hours as needed.     amLODIPine (NORVASC) 10 MG tablet Take 1 tablet (10 mg total) by mouth once daily.    AMPICILLIN SODIUM (AMPICILLIN 2 G/100 ML NS, READY TO MIX SYSTEM,) Inject 100 mLs (2 g total) into the vein every 4 (four) hours.    benzonatate (TESSALON) 100 MG capsule Take 100 mg by mouth 3 (three) times daily as needed for Cough.    cefTRIAXone 2 g in dextrose 5 % 50 mL (ROCEPHIN) 2 g/50 mL PgBk IVPB Inject 50 mLs (2 g total) into the vein  every 12 (twelve) hours.    CHERATUSSIN AC  mg/5 mL syrup Take 10 mLs by mouth every 6 (six) hours as needed.    furosemide (LASIX) 40 MG tablet Take 1 tablet (40 mg total) by mouth once daily.    guaifenesin-codeine 100-10 mg/5 ml (CHERATUSSIN AC)  mg/5 mL syrup Take 5 mLs by mouth 3 (three) times daily as needed.    loratadine (CLARITIN) 10 mg tablet Take 10 mg by mouth once daily.    mirtazapine (REMERON) 30 MG tablet Take 1 tablet (30 mg total) by mouth every evening.    tamsulosin (FLOMAX) 0.4 mg Cp24 Take 2 capsules (0.8 mg total) by mouth 2 (two) times daily.    VENTOLIN HFA 90 mcg/actuation inhaler     [DISCONTINUED] fluticasone (FLONASE) 50 mcg/actuation nasal spray 1 spray (50 mcg total) by Each Nare route 2 (two) times daily.    [DISCONTINUED] hydrocodone-acetaminophen 5-325mg (NORCO) 5-325 mg per tablet Take 1 tablet by mouth every 6 (six) hours as needed for Pain.     Family History     Problem Relation (Age of Onset)    Heart disease Mother, Father    No Known Problems Sister, Brother    Stroke Mother        Social History Main Topics    Smoking status: Never Smoker    Smokeless tobacco: Never Used    Alcohol use Yes      Comment: none recently    Drug use: No    Sexual activity: Not Currently     Partners: Female     Review of Systems   Constitutional: Positive for fatigue and fever (subjective). Negative for appetite change, chills, diaphoresis and unexpected weight change.   HENT: Negative for congestion, sore throat and trouble swallowing.    Eyes: Negative for photophobia and visual disturbance.   Respiratory: Positive for cough and chest tightness. Negative for shortness of breath.    Cardiovascular: Positive for leg swelling. Negative for chest pain and palpitations.   Gastrointestinal: Negative for abdominal pain, blood in stool, constipation, diarrhea, nausea and vomiting.   Genitourinary: Negative for decreased urine volume and difficulty urinating.    Musculoskeletal: Negative for arthralgias and back pain.   Skin: Negative for color change and pallor.   Neurological: Negative for dizziness, light-headedness and headaches.   Psychiatric/Behavioral: Negative for agitation, behavioral problems and confusion.     Objective:     Vital Signs (Most Recent):  Temp: 98.7 °F (37.1 °C) (03/25/18 1500)  Pulse: 98 (03/25/18 1536)  Resp: 18 (03/25/18 1536)  BP: (!) 147/67 (03/25/18 1536)  SpO2: 95 % (03/25/18 1536) Vital Signs (24h Range):  Temp:  [98.6 °F (37 °C)-98.8 °F (37.1 °C)] 98.7 °F (37.1 °C)  Pulse:  [] 98  Resp:  [15-26] 18  SpO2:  [95 %-99 %] 95 %  BP: (135-147)/(66-74) 147/67     Weight: 72.6 kg (160 lb)  Body mass index is 23.63 kg/m².    Physical Exam   Constitutional: He is oriented to person, place, and time. He appears well-developed and well-nourished. No distress.   HENT:   Head: Normocephalic and atraumatic.   Eyes: Conjunctivae are normal.   Neck: Normal range of motion. Neck supple.   Cardiovascular: Normal rate, regular rhythm, normal heart sounds and intact distal pulses.    Pulmonary/Chest: Effort normal. No respiratory distress. He has rales (right middle to lower lung zones).   Abdominal: Soft. He exhibits no distension. There is no tenderness.   Musculoskeletal: He exhibits edema (1+ pitting edema just above ankles b/l).   Neurological: He is alert and oriented to person, place, and time.   Skin: There is pallor.   Psychiatric: He has a normal mood and affect. His behavior is normal.        Significant Labs:  Recent Results (from the past 24 hour(s))   Comprehensive metabolic panel    Collection Time: 03/25/18 11:37 AM   Result Value Ref Range    Sodium 139 136 - 145 mmol/L    Potassium 4.4 3.5 - 5.1 mmol/L    Chloride 104 95 - 110 mmol/L    CO2 23 23 - 29 mmol/L    Glucose 107 70 - 110 mg/dL    BUN, Bld 18 8 - 23 mg/dL    Creatinine 1.4 0.5 - 1.4 mg/dL    Calcium 9.1 8.7 - 10.5 mg/dL    Total Protein 6.9 6.0 - 8.4 g/dL    Albumin 2.6 (L)  3.5 - 5.2 g/dL    Total Bilirubin 0.5 0.1 - 1.0 mg/dL    Alkaline Phosphatase 66 55 - 135 U/L    AST 27 10 - 40 U/L    ALT 20 10 - 44 U/L    Anion Gap 12 8 - 16 mmol/L    eGFR if African American 59.7 (A) >60 mL/min/1.73 m^2    eGFR if non  51.6 (A) >60 mL/min/1.73 m^2   CBC auto differential    Collection Time: 03/25/18 11:37 AM   Result Value Ref Range    WBC 8.45 3.90 - 12.70 K/uL    RBC 2.58 (L) 4.60 - 6.20 M/uL    Hemoglobin 7.5 (L) 14.0 - 18.0 g/dL    Hematocrit 23.7 (L) 40.0 - 54.0 %    MCV 92 82 - 98 fL    MCH 29.1 27.0 - 31.0 pg    MCHC 31.6 (L) 32.0 - 36.0 g/dL    RDW 18.3 (H) 11.5 - 14.5 %    Platelets 226 150 - 350 K/uL    MPV 10.4 9.2 - 12.9 fL    Immature Granulocytes 0.2 0.0 - 0.5 %    Gran # (ANC) 6.7 1.8 - 7.7 K/uL    Immature Grans (Abs) 0.02 0.00 - 0.04 K/uL    Lymph # 0.7 (L) 1.0 - 4.8 K/uL    Mono # 0.6 0.3 - 1.0 K/uL    Eos # 0.5 0.0 - 0.5 K/uL    Baso # 0.03 0.00 - 0.20 K/uL    nRBC 0 0 /100 WBC    Gran% 79.2 (H) 38.0 - 73.0 %    Lymph% 7.9 (L) 18.0 - 48.0 %    Mono% 7.0 4.0 - 15.0 %    Eosinophil% 5.3 0.0 - 8.0 %    Basophil% 0.4 0.0 - 1.9 %    Differential Method Automated    Magnesium    Collection Time: 03/25/18 11:37 AM   Result Value Ref Range    Magnesium 2.0 1.6 - 2.6 mg/dL   Troponin I    Collection Time: 03/25/18 11:37 AM   Result Value Ref Range    Troponin I 0.026 0.000 - 0.026 ng/mL   Brain natriuretic peptide    Collection Time: 03/25/18 11:37 AM   Result Value Ref Range     (H) 0 - 99 pg/mL   D dimer, quantitative    Collection Time: 03/25/18 11:37 AM   Result Value Ref Range    D-Dimer 1.45 (H) <0.50 mg/L FEU   Procalcitonin    Collection Time: 03/25/18 11:37 AM   Result Value Ref Range    Procalcitonin 0.15 <0.25 ng/mL   Iron and TIBC    Collection Time: 03/25/18 11:37 AM   Result Value Ref Range    Iron 21 (L) 45 - 160 ug/dL    Transferrin 98 (L) 200 - 375 mg/dL    TIBC 145 (L) 250 - 450 ug/dL    Saturated Iron 14 (L) 20 - 50 %   Ferritin    Collection  Time: 03/25/18 11:37 AM   Result Value Ref Range    Ferritin 836 (H) 20.0 - 300.0 ng/mL   Type & Screen    Collection Time: 03/25/18  2:22 PM   Result Value Ref Range    Group & Rh A POS     Indirect Juan Alberto NEG    Procalcitonin    Collection Time: 03/25/18  2:22 PM   Result Value Ref Range    Procalcitonin 0.15 <0.25 ng/mL   Urinalysis, Reflex to Urine Culture Urine, Clean Catch    Collection Time: 03/25/18  3:27 PM   Result Value Ref Range    Specimen UA Urine, Clean Catch     Color, UA Straw Yellow, Straw, Kesha    Appearance, UA Clear Clear    pH, UA 7.0 5.0 - 8.0    Specific Gravity, UA 1.005 1.005 - 1.030    Protein, UA Negative Negative    Glucose, UA Negative Negative    Ketones, UA Negative Negative    Bilirubin (UA) Negative Negative    Occult Blood UA 2+ (A) Negative    Nitrite, UA Negative Negative    Urobilinogen, UA Negative <2.0 EU/dL    Leukocytes, UA Negative Negative   Urinalysis Microscopic    Collection Time: 03/25/18  3:27 PM   Result Value Ref Range    RBC, UA 12 (H) 0 - 4 /hpf    WBC, UA 0 0 - 5 /hpf    Bacteria, UA Rare None-Occ /hpf    Squam Epithel, UA 0 /hpf    Microscopic Comment SEE COMMENT          Significant Imaging:    CXR  Patchy consolidation projected over the right mid and lower lung zones concerning for infection, clinical correlation advised, follow-up recommended to ensure resolution.      Assessment/Plan:     Lower extremity edema    Worsening BLE for past 5 days despite increasing home Lasix 40mg qday to 40mg BID.  - Could be s/e of Norvasc or worsening HF from endocarditis, although pt not having SOB and CXR not consistent with pulmonary edema  - Hold norvasc  - BLE US negative for DVT  - 2D echo with color flow doppler            Endocarditis    Diagnosed by SOWMYA on 2/27 presumably from enterococcus faecalis bacteremia.   - Has mod-severe AR and severe MR  - Needs CTS and SOWMYA follow up in 4 weeks (which is now)  - Has ID f/u on 3/27/18  - Last negative BCx 3/1 so receiving 6  weeks of Rocephin and Ampicillin (end date 4/12)  - Continue Rocephin 2g IV BID and Ampicillin 2g IV q4h          Chronic cough    Chronic, dry, non-productive cough since December and 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  - Used to take lisinopril, which was discontinued, but no improvement in cough   - 2/22 CT chest significant for multiple pulmonary nodules <1cm scattered throughout  - Saw pulmonary on 3/16 who recommend repeat CT chest in 3 months  - Sputum culture  - 3/25 CXR show patchy consolidation over the right mid and lower lung zones concerning for infection  - Procal normal 0.15, pt afebrile, and no leukocytosis so unclear whether this is a pneumonia or maybe septic emboli?  - Will consider repeat CT        Anemia    Hb 7.5 upon admission (last Hb 9.1 on 3/15); Pt denies hematochezia or melena.  -  2/1/18 EGD and C scope showed non-bleeding erosive gastropathy, internal hemorrhoid and diverticulosis in sigmoid and descending colon  - Has GI appt on 3/29/18 this week for f/u  - F/u Iron studies   - F/u UA to rule out hematuria  - Will continue to monitor Hb and transfuse Hb < 7            HTN (hypertension)    Takes Lasix 40mg qday and Norvasc 10mg qday at home.  - Continue Lasix and hold norvasc 2/2 LE edema  - Consider discharging patient on thiazide  - Hydralazine 25mg prn            VTE Risk Mitigation         Ordered     IP VTE LOW RISK PATIENT  Once      03/25/18 6276             Alley Voss MD  Department of Hospital Medicine   [facility]

## 2018-03-25 NOTE — ASSESSMENT & PLAN NOTE
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  - Used to take lisinopril, which was discontinued, but no improvement in cough   - 2/22 CT chest significant for multiple pulmonary nodules <1cm scattered throughout  - Saw pulmonary on 3/16 who recommend repeat CT chest in 3 months  - Sputum culture  - 3/25 CXR show patchy consolidation over the right mid and lower lung zones concerning for infection  - Procal normal 0.15, pt afebrile, and no leukocytosis so unclear whether this is a pneumonia?  - Will consider repeat CT

## 2018-03-25 NOTE — ED TRIAGE NOTES
Pt presents with bilateral lower extremity swelling that has worsened over the last few days. The left is more swollen than the right. Pt has a chronic cough as well. 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

## 2018-03-25 NOTE — ASSESSMENT & PLAN NOTE
Takes Lasix 40mg qday and Norvasc 10mg qday at home.  - Continue Lasix and hold norvasc 2/2 LE edema  - Consider discharging patient on thiazide  - Hydralazine 25mg prn

## 2018-03-25 NOTE — ASSESSMENT & PLAN NOTE
Diagnosed by SOWMYA on 2/27 presumably from enterococcus faecalis bacteremia.   - Has mod-severe AR and severe MR  - Needs CTS and SOWMYA follow up in 4 weeks (which is now)  - Has ID f/u on 3/27/18  - Last negative BCx 3/1 so receiving 6 weeks of Rocephin and Ampicillin (end date 4/12)  - Continue Rocephin 2g IV BID and Ampicillin 2g IV q4h

## 2018-03-25 NOTE — MEDICAL/APP STUDENT
"H&P  ***    Admit Date: 3/25/2018      Subjective     CC:  Leg swelling    HPI:   Mr Messina is a 68yo M with PMHx of HTN, recent endocarditis with E. faecalis, moderate to severe AR, severe MR and anemia who presents with 5 day duration of b/l feet and ankle swelling.  He was recently admitted to the ICU due to acute respiratory failure secondary to cardiogenic pulmonary edema.  During this admission, he was found to be anemic and underwent EGD and C scope, which did not identify any source of active bleeding.  He received 2 units of PRBCs for symptomatic relief.  He was discharged on 3/6/2018 and given 6 weeks of Amp and ceftriaxone via PICC for endocarditis.      Today, pt complains of b/l feet and ankle swelling of 5 day duration.  He states that he has been doubling his lasix dose since last Wednesday per his primary care physician, which has not improved the swelling.  He also complains of intermittent palpitations and a chronic, dry cough since Eneida not relieved with OTC medications.  He describes a "grabbing" feeling in his chest when he inhales which is only relieved with coughing.  He denies any leg pain, chest pain, weakness, SOB, nausea, vomiting, hematochezia, melena, dysuria, fevers or chills.      Of note, a previous CTA chest revealed multiple pulmonary nodules for which Pulmonology was consulted patient has occupational asbestos exposure.      Past Medical History:   Diagnosis Date    ANNA (acute kidney injury) 2/22/2018    Anemia     Anxiety     BPH (benign prostatic hyperplasia)     Diverticulosis     Hypertension     Urinary retention      Past Surgical History:   Procedure Laterality Date    APPENDECTOMY      COLONOSCOPY      COLONOSCOPY N/A 2/1/2018    Procedure: COLONOSCOPY;  Surgeon: Richar Payan MD;  Location: [address];  Service: Endoscopy;  Laterality: N/A;  PM prep    EYE SURGERY Right     cataract extraction    FINGER SURGERY      FRACTURE SURGERY Right     " index finger    TONSILLECTOMY       Social History     Social History    Marital status:      Spouse name: N/A    Number of children: N/A    Years of education: N/A     Occupational History     [employer]     Social History Main Topics    Smoking status: Never Smoker    Smokeless tobacco: Never Used    Alcohol use Yes      Comment: none recently    Drug use: No    Sexual activity: Not Currently     Partners: Female     Other Topics Concern    Not on file     Social History Narrative    No narrative on file         Family History   Problem Relation Age of Onset    Stroke Mother     Heart disease Mother      CABG    Heart disease Father      CABG    No Known Problems Sister     No Known Problems Brother        Current Facility-Administered Medications:     acetaminophen tablet 650 mg, 650 mg, Oral, Q4H PRN, Alley Voss MD    ondansetron disintegrating tablet 8 mg, 8 mg, Oral, Q8H PRN, Alley Voss MD    promethazine-codeine 6.25-10 mg/5 ml syrup 5 mL, 5 mL, Oral, Q4H PRN, Gloria Merritt MD    ramelteon tablet 8 mg, 8 mg, Oral, QHS, Alley Voss MD    sodium chloride 0.9% flush 3 mL, 3 mL, Intravenous, PRN, Alley Voss MD    Current Outpatient Prescriptions:     albuterol-ipratropium 2.5mg-0.5mg/3mL (DUO-NEB) 0.5 mg-3 mg(2.5 mg base)/3 mL nebulizer solution, 3 mLs every 4 (four) hours as needed. , Disp: , Rfl:     amLODIPine (NORVASC) 10 MG tablet, Take 1 tablet (10 mg total) by mouth once daily., Disp: 90 tablet, Rfl: 3    AMPICILLIN SODIUM (AMPICILLIN 2 G/100 ML NS, READY TO MIX SYSTEM,), Inject 100 mLs (2 g total) into the vein every 4 (four) hours., Disp: 50829 mL, Rfl: 1    benzonatate (TESSALON) 100 MG capsule, Take 100 mg by mouth 3 (three) times daily as needed for Cough., Disp: , Rfl:     cefTRIAXone 2 g in dextrose 5 % 50 mL (ROCEPHIN) 2 g/50 mL PgBk IVPB, Inject 50 mLs (2 g total) into the vein every 12 (twelve) hours., Disp: 60 each, Rfl:  1    CHERATUSSIN AC  mg/5 mL syrup, Take 10 mLs by mouth every 6 (six) hours as needed., Disp: 1 Bottle, Rfl: 0    furosemide (LASIX) 40 MG tablet, Take 1 tablet (40 mg total) by mouth once daily., Disp: 30 tablet, Rfl: 1    guaifenesin-codeine 100-10 mg/5 ml (CHERATUSSIN AC)  mg/5 mL syrup, Take 5 mLs by mouth 3 (three) times daily as needed., Disp: 180 mL, Rfl: 0    loratadine (CLARITIN) 10 mg tablet, Take 10 mg by mouth once daily., Disp: , Rfl:     mirtazapine (REMERON) 30 MG tablet, Take 1 tablet (30 mg total) by mouth every evening., Disp: 30 tablet, Rfl: 3    tamsulosin (FLOMAX) 0.4 mg Cp24, Take 2 capsules (0.8 mg total) by mouth 2 (two) times daily., Disp: 120 capsule, Rfl: 11    VENTOLIN HFA 90 mcg/actuation inhaler, , Disp: , Rfl:       Review of patient's allergies indicates:   Allergen Reactions    Augmentin [amoxicillin-pot clavulanate]      Patient felt that it raised BP and requested to have it added as intolerance. Tolerated unasyn    Ciprofloxacin     Flagyl [metronidazole]     Lisinopril Other (See Comments)     cough    Mysoline [primidone]     Omnicef [cefdinir]     Shellfish containing products        Review of Systems   Constitutional: Positive for malaise/fatigue. Negative for chills and fever.   HENT: Negative for congestion, ear pain and sore throat.    Eyes: Negative.    Respiratory: Positive for cough. Negative for hemoptysis and sputum production.    Cardiovascular: Positive for palpitations and leg swelling (b/l feet and ankles). Negative for chest pain.   Gastrointestinal: Negative for abdominal pain, blood in stool, melena, nausea and vomiting.   Genitourinary: Negative.    Musculoskeletal: Negative.    Skin: Negative.    Neurological: Positive for tremors (b/l hands). Negative for dizziness and focal weakness.   Endo/Heme/Allergies: Negative.    Psychiatric/Behavioral: Negative.        Objective     Vitals  Temp:  [98.6 °F (37 °C)-98.8 °F (37.1 °C)]   Pulse:   []   Resp:  [15-26]   BP: (135-138)/(66-68)   SpO2:  [95 %-99 %]        I & O (Last 24H):No intake or output data in the 24 hours ending 03/25/18 1525      Physical Exam   Constitutional: He is oriented to person, place, and time and well-developed, well-nourished, and in no distress. No distress.   HENT:   Head: Normocephalic and atraumatic.   Eyes: Pupils are equal, round, and reactive to light.   Neck: Normal range of motion.   Cardiovascular: Normal rate, regular rhythm and normal heart sounds.    Pulmonary/Chest: Effort normal. He has rales (worst in R mid zone).   Abdominal: Soft. Bowel sounds are normal. There is no tenderness. There is no guarding.   Musculoskeletal: Normal range of motion. He exhibits edema (b/l legs up to ankles).   Neurological: He is alert and oriented to person, place, and time. GCS score is 15.   Skin: Skin is warm.   Psychiatric: Mood and affect normal.       Diagnostic Results:  CBC  Lab Results   Component Value Date    WBC 8.45 03/25/2018    HGB 7.5 (L) 03/25/2018    HCT 23.7 (L) 03/25/2018    MCV 92 03/25/2018     03/25/2018       BMP  Lab Results   Component Value Date     03/25/2018    K 4.4 03/25/2018     03/25/2018    CO2 23 03/25/2018    BUN 18 03/25/2018    CREATININE 1.4 03/25/2018    CALCIUM 9.1 03/25/2018    ANIONGAP 12 03/25/2018    ESTGFRAFRICA 59.7 (A) 03/25/2018    EGFRNONAA 51.6 (A) 03/25/2018       Imaging  CXR 3/25/2018: Patchy consolidation over R mid and lower lung zones concerning for infection  US Lower Extremity 3/25/2018: No evidence of DVT in either lower extremity      Assessment     Mr Messina is a 66yo M with PMHx of HTN, recent endocarditis with E. faecalis, moderate to severe AR, severe MR and anemia who presents with 5 day duration of b/l feet and ankle swelling likely in the setting of CHF.  Also complaining of dry cough since December.    DDx:  CHF   -b/l lower extremity edema  -PMHx of valvular disease > heart  dysfunction    Amlodipine adverse effect  -would expect to be a more chronic issue    Low oncotic pressure  -liver function not impaired  -no hypoalbuminemia       Plan     Leg swelling  -hold amlodipine, will do hydralazine for BP control prn  -restart lasix  -Lower extremity US negative for DVT  -2D echo with doppler    Infective endocarditis  -continue amp and ceftriaxone   -Repeat SOWMYA in 1 month  -continue ABX regimen, switch from home meds to hospital IV    Chronic cough  -occupational exposure to asbestos and dust for ~37 yrs  -2/22 CT significant for multiple pulm nodules <1cm scattered throughout  -3/25 CXR shows patchy consolidation in R lower and mid zones concerning for infection  -pt afebrile, no leukocytosis, pneumon    Anemia  -consented for transfusion, consider if Hb dips below 7 or he is symptomatic

## 2018-03-25 NOTE — ASSESSMENT & PLAN NOTE
Hb 7.5 upon admission (last Hb 9.1 on 3/15); Pt denies hematochezia or melena.  -  2/1/18 EGD and C scope showed non-bleeding erosive gastropathy, internal hemorrhoid and diverticulosis in sigmoid and descending colon  - Has GI appt on 3/29/18 this week for f/u  - F/u Iron studies   - F/u UA to rule out hematuria  - Will continue to monitor Hb and transfuse Hb < 7

## 2018-03-25 NOTE — ASSESSMENT & PLAN NOTE
Worsening BLE for past 5 days despite increasing home Lasix 40mg qday to 40mg BID.  - Could be s/e of Norvasc or worsening HF from endocarditis, although pt not having SOB and CXR not consistent with pulmonary edema  - Hold norvasc  - BLE US negative for DVT  - 2D echo with color flow doppler

## 2018-03-26 ENCOUNTER — TELEPHONE (OUTPATIENT)
Dept: FAMILY MEDICINE | Facility: CLINIC | Age: 68
End: 2018-03-26

## 2018-03-26 LAB
ALBUMIN SERPL BCP-MCNC: 2.3 G/DL
ALP SERPL-CCNC: 58 U/L
ALT SERPL W/O P-5'-P-CCNC: 17 U/L
ANION GAP SERPL CALC-SCNC: 8 MMOL/L
AORTIC VALVE REGURGITATION: ABNORMAL
AST SERPL-CCNC: 22 U/L
BACTERIA SPEC AEROBE CULT: NORMAL
BASOPHILS # BLD AUTO: 0.03 K/UL
BASOPHILS NFR BLD: 0.4 %
BILIRUB SERPL-MCNC: 0.4 MG/DL
BUN SERPL-MCNC: 17 MG/DL
CALCIUM SERPL-MCNC: 9 MG/DL
CHLORIDE SERPL-SCNC: 106 MMOL/L
CO2 SERPL-SCNC: 25 MMOL/L
CREAT SERPL-MCNC: 1.5 MG/DL
DIFFERENTIAL METHOD: ABNORMAL
EOSINOPHIL # BLD AUTO: 0.6 K/UL
EOSINOPHIL NFR BLD: 7 %
ERYTHROCYTE [DISTWIDTH] IN BLOOD BY AUTOMATED COUNT: 18.1 %
EST. GFR  (AFRICAN AMERICAN): 54.9 ML/MIN/1.73 M^2
EST. GFR  (NON AFRICAN AMERICAN): 47.5 ML/MIN/1.73 M^2
ESTIMATED PA SYSTOLIC PRESSURE: 64.85
GLUCOSE SERPL-MCNC: 93 MG/DL
GRAM STN SPEC: NORMAL
GRAM STN SPEC: NORMAL
HCT VFR BLD AUTO: 23.4 %
HGB BLD-MCNC: 7.3 G/DL
IMM GRANULOCYTES # BLD AUTO: 0.03 K/UL
IMM GRANULOCYTES NFR BLD AUTO: 0.4 %
LYMPHOCYTES # BLD AUTO: 0.6 K/UL
LYMPHOCYTES NFR BLD: 7.3 %
MAGNESIUM SERPL-MCNC: 2.1 MG/DL
MCH RBC QN AUTO: 28.9 PG
MCHC RBC AUTO-ENTMCNC: 31.2 G/DL
MCV RBC AUTO: 93 FL
MITRAL VALVE MOBILITY: NORMAL
MITRAL VALVE REGURGITATION: ABNORMAL
MONOCYTES # BLD AUTO: 0.6 K/UL
MONOCYTES NFR BLD: 7.8 %
NEUTROPHILS # BLD AUTO: 6.3 K/UL
NEUTROPHILS NFR BLD: 77.1 %
NRBC BLD-RTO: 0 /100 WBC
PLATELET # BLD AUTO: 210 K/UL
PMV BLD AUTO: 10.6 FL
POTASSIUM SERPL-SCNC: 4.5 MMOL/L
PROT SERPL-MCNC: 6 G/DL
RBC # BLD AUTO: 2.53 M/UL
RETIRED EF AND QEF - SEE NOTES: 65 (ref 55–65)
SODIUM SERPL-SCNC: 139 MMOL/L
TRICUSPID VALVE REGURGITATION: ABNORMAL
WBC # BLD AUTO: 8.18 K/UL

## 2018-03-26 PROCEDURE — 25000003 PHARM REV CODE 250: Performed by: STUDENT IN AN ORGANIZED HEALTH CARE EDUCATION/TRAINING PROGRAM

## 2018-03-26 PROCEDURE — 63600175 PHARM REV CODE 636 W HCPCS: Performed by: HOSPITALIST

## 2018-03-26 PROCEDURE — 63600175 PHARM REV CODE 636 W HCPCS: Performed by: INTERNAL MEDICINE

## 2018-03-26 PROCEDURE — 11000001 HC ACUTE MED/SURG PRIVATE ROOM

## 2018-03-26 PROCEDURE — 25000003 PHARM REV CODE 250: Performed by: HOSPITALIST

## 2018-03-26 PROCEDURE — G0378 HOSPITAL OBSERVATION PER HR: HCPCS

## 2018-03-26 PROCEDURE — 83735 ASSAY OF MAGNESIUM: CPT

## 2018-03-26 PROCEDURE — 87205 SMEAR GRAM STAIN: CPT

## 2018-03-26 PROCEDURE — 25000003 PHARM REV CODE 250: Performed by: INTERNAL MEDICINE

## 2018-03-26 PROCEDURE — 99222 1ST HOSP IP/OBS MODERATE 55: CPT | Mod: AI,,, | Performed by: HOSPITALIST

## 2018-03-26 PROCEDURE — 63600175 PHARM REV CODE 636 W HCPCS: Performed by: STUDENT IN AN ORGANIZED HEALTH CARE EDUCATION/TRAINING PROGRAM

## 2018-03-26 PROCEDURE — 93306 TTE W/DOPPLER COMPLETE: CPT

## 2018-03-26 PROCEDURE — 85025 COMPLETE CBC W/AUTO DIFF WBC: CPT

## 2018-03-26 PROCEDURE — 97161 PT EVAL LOW COMPLEX 20 MIN: CPT

## 2018-03-26 PROCEDURE — 80053 COMPREHEN METABOLIC PANEL: CPT

## 2018-03-26 PROCEDURE — 36415 COLL VENOUS BLD VENIPUNCTURE: CPT

## 2018-03-26 PROCEDURE — 93306 TTE W/DOPPLER COMPLETE: CPT | Mod: 26,,, | Performed by: INTERNAL MEDICINE

## 2018-03-26 PROCEDURE — 99223 1ST HOSP IP/OBS HIGH 75: CPT | Mod: ,,, | Performed by: INTERNAL MEDICINE

## 2018-03-26 PROCEDURE — 87070 CULTURE OTHR SPECIMN AEROBIC: CPT

## 2018-03-26 RX ORDER — PROMETHAZINE HYDROCHLORIDE AND CODEINE PHOSPHATE 6.25; 1 MG/5ML; MG/5ML
7.5 SOLUTION ORAL EVERY 4 HOURS PRN
Status: DISCONTINUED | OUTPATIENT
Start: 2018-03-26 | End: 2018-03-26

## 2018-03-26 RX ORDER — PROMETHAZINE HYDROCHLORIDE AND CODEINE PHOSPHATE 6.25; 1 MG/5ML; MG/5ML
7.5 SOLUTION ORAL EVERY 4 HOURS PRN
Qty: 210 ML | Refills: 0 | Status: CANCELLED | OUTPATIENT
Start: 2018-03-26 | End: 2018-04-05

## 2018-03-26 RX ORDER — FAMOTIDINE 20 MG/1
20 TABLET, FILM COATED ORAL 2 TIMES DAILY
Status: DISCONTINUED | OUTPATIENT
Start: 2018-03-26 | End: 2018-03-27

## 2018-03-26 RX ORDER — ENOXAPARIN SODIUM 100 MG/ML
40 INJECTION SUBCUTANEOUS EVERY 24 HOURS
Status: DISCONTINUED | OUTPATIENT
Start: 2018-03-26 | End: 2018-03-28 | Stop reason: HOSPADM

## 2018-03-26 RX ORDER — DOXYCYCLINE HYCLATE 100 MG
100 TABLET ORAL EVERY 12 HOURS
Status: DISCONTINUED | OUTPATIENT
Start: 2018-03-26 | End: 2018-03-27

## 2018-03-26 RX ORDER — PROMETHAZINE HYDROCHLORIDE AND CODEINE PHOSPHATE 6.25; 1 MG/5ML; MG/5ML
7.5 SOLUTION ORAL EVERY 4 HOURS PRN
Status: DISCONTINUED | OUTPATIENT
Start: 2018-03-26 | End: 2018-03-28 | Stop reason: HOSPADM

## 2018-03-26 RX ORDER — GUAIFENESIN/DEXTROMETHORPHAN 100-10MG/5
5 SYRUP ORAL EVERY 4 HOURS PRN
Status: DISCONTINUED | OUTPATIENT
Start: 2018-03-26 | End: 2018-03-28 | Stop reason: HOSPADM

## 2018-03-26 RX ORDER — PROMETHAZINE HYDROCHLORIDE AND CODEINE PHOSPHATE 6.25; 1 MG/5ML; MG/5ML
7.5 SOLUTION ORAL EVERY 4 HOURS PRN
Qty: 1 BOTTLE | Refills: 0 | Status: SHIPPED | OUTPATIENT
Start: 2018-03-26 | End: 2018-04-05

## 2018-03-26 RX ORDER — BENZONATATE 100 MG/1
100 CAPSULE ORAL 3 TIMES DAILY PRN
Status: DISCONTINUED | OUTPATIENT
Start: 2018-03-26 | End: 2018-03-28 | Stop reason: HOSPADM

## 2018-03-26 RX ADMIN — FAMOTIDINE 20 MG: 20 TABLET, FILM COATED ORAL at 08:03

## 2018-03-26 RX ADMIN — GUAIFENESIN AND DEXTROMETHORPHAN 5 ML: 100; 10 SYRUP ORAL at 04:03

## 2018-03-26 RX ADMIN — GUAIFENESIN AND DEXTROMETHORPHAN 5 ML: 100; 10 SYRUP ORAL at 03:03

## 2018-03-26 RX ADMIN — TAMSULOSIN HYDROCHLORIDE 0.8 MG: 0.4 CAPSULE ORAL at 09:03

## 2018-03-26 RX ADMIN — BENZONATATE 100 MG: 100 CAPSULE ORAL at 10:03

## 2018-03-26 RX ADMIN — ENOXAPARIN SODIUM 40 MG: 100 INJECTION SUBCUTANEOUS at 05:03

## 2018-03-26 RX ADMIN — RAMELTEON 8 MG: 8 TABLET, FILM COATED ORAL at 08:03

## 2018-03-26 RX ADMIN — CETIRIZINE HYDROCHLORIDE 10 MG: 10 TABLET, FILM COATED ORAL at 09:03

## 2018-03-26 RX ADMIN — AMPICILLIN SODIUM 2 G: 2 INJECTION, POWDER, FOR SOLUTION INTRAMUSCULAR; INTRAVENOUS at 08:03

## 2018-03-26 RX ADMIN — AMPICILLIN SODIUM 2 G: 2 INJECTION, POWDER, FOR SOLUTION INTRAMUSCULAR; INTRAVENOUS at 04:03

## 2018-03-26 RX ADMIN — DOXYCYCLINE HYCLATE 100 MG: 100 TABLET, COATED ORAL at 08:03

## 2018-03-26 RX ADMIN — CEFTRIAXONE SODIUM 2 G: 2 INJECTION, POWDER, FOR SOLUTION INTRAMUSCULAR; INTRAVENOUS at 04:03

## 2018-03-26 RX ADMIN — CEFTRIAXONE SODIUM 2 G: 2 INJECTION, POWDER, FOR SOLUTION INTRAMUSCULAR; INTRAVENOUS at 05:03

## 2018-03-26 RX ADMIN — PROMETHAZINE HYDROCHLORIDE AND CODEINE PHOSPHATE 5 ML: 10; 6.25 SOLUTION ORAL at 01:03

## 2018-03-26 RX ADMIN — MIRTAZAPINE 30 MG: 15 TABLET, FILM COATED ORAL at 08:03

## 2018-03-26 RX ADMIN — GUAIFENESIN AND DEXTROMETHORPHAN 5 ML: 100; 10 SYRUP ORAL at 12:03

## 2018-03-26 RX ADMIN — AMPICILLIN SODIUM 2 G: 2 INJECTION, POWDER, FOR SOLUTION INTRAMUSCULAR; INTRAVENOUS at 03:03

## 2018-03-26 RX ADMIN — AMPICILLIN SODIUM 2 G: 2 INJECTION, POWDER, FOR SOLUTION INTRAMUSCULAR; INTRAVENOUS at 12:03

## 2018-03-26 RX ADMIN — GUAIFENESIN AND DEXTROMETHORPHAN 5 ML: 100; 10 SYRUP ORAL at 08:03

## 2018-03-26 RX ADMIN — FUROSEMIDE 40 MG: 40 TABLET ORAL at 09:03

## 2018-03-26 NOTE — MEDICAL/APP STUDENT
"Progress Note  ***    Admit Date: 3/25/2018      Subjective     CC:  Leg swelling     HPI:   Mr Mayuri is a 68yo M with PMHx of HTN, recent endocarditis with E. faecalis, moderate to severe AR, severe MR and anemia who presents with 5 day duration of b/l feet and ankle swelling.  He was recently admitted to the ICU due to acute respiratory failure secondary to cardiogenic pulmonary edema.  During this admission, he was found to be anemic and underwent EGD and C scope, which did not identify any source of active bleeding.  He received 2 units of PRBCs for symptomatic relief.  He was discharged on 3/6/2018 and given 6 weeks of Amp and ceftriaxone via PICC for endocarditis.       Today, pt complains of b/l feet and ankle swelling of 5 day duration.  He states that he has been doubling his lasix dose since last Wednesday per his primary care physician, which has not improved the swelling.  He also complains of intermittent palpitations and a chronic, dry cough since [place] not relieved with OTC medications.  He describes a "grabbing" feeling in his chest when he inhales which is only relieved with coughing.  He denies any leg pain, chest pain, weakness, SOB, nausea, vomiting, hematochezia, melena, dysuria, fevers or chills.       Of note, a previous CTA chest revealed multiple pulmonary nodules for which Pulmonology was consulted patient has occupational asbestos exposure.    Overnight:   Did well, no new complaints.  Leg swelling has significantly decreased, was wearing compression stockings in bed.  Denies leg pain.  Still experiencing dry cough and says he feels a little congested today, but denies SOB or chest pain.       Review of Systems   Constitutional: Negative for chills and fever.   HENT: Positive for congestion. Negative for sinus pain and sore throat.    Eyes: Negative.    Respiratory: Positive for cough (dry). Negative for shortness of breath and wheezing.    Cardiovascular: Positive for leg swelling " (b/l feet, improved from yesterday). Negative for chest pain and palpitations.   Gastrointestinal: Negative for abdominal pain, nausea and vomiting.   Genitourinary: Negative.    Musculoskeletal: Negative.    Skin: Negative.    Neurological: Positive for tremors (b/l hands). Negative for dizziness, weakness and headaches.   Endo/Heme/Allergies: Negative.    Psychiatric/Behavioral: Negative.          Objective     Vitals  Temp:  [96.6 °F (35.9 °C)-99.4 °F (37.4 °C)]   Pulse:  []   Resp:  [15-22]   BP: (130-147)/(66-75)   SpO2:  [87 %-97 %]      I & O (Last 24H):  Intake/Output Summary (Last 24 hours) at 03/26/18 1303  Last data filed at 03/26/18 0431   Gross per 24 hour   Intake                0 ml   Output             2100 ml   Net            -2100 ml         Physical Exam   Constitutional: He is oriented to person, place, and time and well-developed, well-nourished, and in no distress. No distress.   HENT:   Head: Normocephalic and atraumatic.   Eyes: Conjunctivae are normal. Pupils are equal, round, and reactive to light.   Neck: Normal range of motion.   Cardiovascular: Normal rate, regular rhythm, normal heart sounds and intact distal pulses.    Pulmonary/Chest: Effort normal. No respiratory distress. He has no wheezes. He has rales (R mid and lower zones).   Abdominal: Soft. Bowel sounds are normal. There is no tenderness.   Musculoskeletal: He exhibits edema (b/l feet trace-1+).   Neurological: He is alert and oriented to person, place, and time. GCS score is 15.   Skin: Skin is warm.   Psychiatric: Mood and affect normal.       Diagnostic Results:  CBC  Lab Results   Component Value Date    WBC 8.18 03/26/2018    HGB 7.3 (L) 03/26/2018    HCT 23.4 (L) 03/26/2018    MCV 93 03/26/2018     03/26/2018       BMP  Lab Results   Component Value Date     03/26/2018    K 4.5 03/26/2018     03/26/2018    CO2 25 03/26/2018    BUN 17 03/26/2018    CREATININE 1.5 (H) 03/26/2018    CALCIUM 9.0  03/26/2018    ANIONGAP 8 03/26/2018    ESTGFRAFRICA 54.9 (A) 03/26/2018    EGFRNONAA 47.5 (A) 03/26/2018       Micro  Microbiology Results (last 7 days)     Procedure Component Value Units Date/Time    Culture, Respiratory with Gram Stain [[id number]] Collected:  03/26/18 1225    Order Status:  Sent Specimen:  Respiratory from Sputum Updated:  03/26/18 1226          Imaging  CT 3/25/2018  -multifocal consolidation and airspace disease in R lower and middle lobes  -assoc small to moderate R pleural effusion  -likely represents pneumonia or aspiration  -septic emboli considered given recent endocarditis, but felt to be less likely given asymmetric distribution  -malignancy less likely given significant interval worsening    CXR 3/25/2018  -patchy consolidation projected over R mid and lower lung zones concerning for infection    Assessment     Mr Messina is a 66yo M with PMHx of HTN, recent endocarditis with E. faecalis, moderate to severe AR, severe MR and anemia who presents with 5 day duration of b/l feet and ankle swelling likely due to an adverse effect of amlopidine.  Also complaining of dry cough since December in the setting of worsening CT scans.    DDx:  Leg swelling:  Amlodipine adverse effect  -newly prescribed upon last discharge from admission earlier in March   -holding amlodipine while here has seemed to relieve the swelling    CHF   -b/l lower extremity edema  -PMHx of valvular disease > heart dysfunction     Low oncotic pressure  -liver function not impaired  -no hypoalbuminemia     Cough:  Pneumonia  -patchy consolidation seen on scans, crackles heard in assoc lung zone on exam   -no infective signs, has remained afebrile, WCC and procal normal    Septic emboli   -less likely as cough preceded endocarditis infection, cough started in Dec 2017    Cancer  -37 year history of occupational asbestos and dust exposure, says all of his co workers have lung cancer now  -no history of smoking    Plan     Leg  swelling  -hold amlodipine, will do hydralazine for BP control prn  -restart lasix  -Lower extremity US negative for DVT  -2D echo with doppler significant for normal EF, pulm HTN, LA enlargement , moderate AR, severe MR, moderate TR and a mobile structure on TR possibly vegetation      Infective endocarditis  -continue amp and ceftriaxone   -Repeat SOWMYA   -continue ABX regimen, switch from home meds to hospital IV     Chronic cough  -occupational exposure to asbestos and dust for ~37 yrs  -2/22 CT significant for multiple pulm nodules <1cm scattered throughout  -3/25 CT shows patchy consolidation in R lower and mid zones, consider pneumonia or septic emboli. Malignancy less likely due to significant interval worsening  -3/25 CXR shows patchy consolidation in R lower and mid zones concerning for infection  -sputum Cx  -pulm consult, possible diagnostic thoracentesis   -consider PPI trial      Anemia  -consented for transfusion, consider if Hb dips below 7 or he is symptomatic

## 2018-03-26 NOTE — PLAN OF CARE
Problem: Physical Therapy Goal  Goal: Physical Therapy Goal  Outcome: Outcome(s) achieved Date Met: 03/26/18  Eval and DC complete  Patient has no inpatient PT needs  Discontinue Orders

## 2018-03-26 NOTE — PROCEDURES
Procedures     Due to indication in patient's history, presentation or risk factors,  a fiber optic exam was performed.    SEPARATE PROCEDURE NOTE:    ANESTHESIA:  Topical xylocaine with mark-synephrine    FINDINGS:  Normal exam      PROCEDURE:  After verbal consent was obtained, the flexible scope was passed through the patient's nasal cavity without difficulty.  The nasopharynx (adenoid pad) and eustachian tube orifices were first visualized and were found to be normal, without masses or irregularity.  The posterior pharyngeal wall and base of tongue were then examined and no mass or irregular tissue was seen.  The scope was then advanced to the larynx, and the epiglottis, valleculae, and piriform sinuses were normal, without masses or mucosal irregularity.  The false vocal folds and true vocal folds were then examined and were found to have normal mobility (full abduction and adduction) and no masses or mucosal irregularity was seen.  The arytenoids and the interarytenoid area were normal. The patient tolerated the procedure well without complications.

## 2018-03-26 NOTE — SUBJECTIVE & OBJECTIVE
Interval History: NAEON.     Review of Systems   Constitutional: Positive for fatigue. Negative for appetite change, chills, diaphoresis, fever (subjective) and unexpected weight change.   HENT: Negative for congestion, sore throat and trouble swallowing.    Eyes: Negative for photophobia and visual disturbance.   Respiratory: Positive for cough. Negative for chest tightness and shortness of breath.    Cardiovascular: Positive for leg swelling. Negative for chest pain and palpitations.   Gastrointestinal: Negative for abdominal pain, blood in stool, constipation, diarrhea, nausea and vomiting.   Genitourinary: Negative for decreased urine volume and difficulty urinating.   Musculoskeletal: Negative for arthralgias and back pain.   Skin: Negative for color change and pallor.   Neurological: Negative for dizziness, light-headedness and headaches.   Psychiatric/Behavioral: Negative for agitation, behavioral problems and confusion.     Objective:     Vital Signs (Most Recent):  Temp: 98.8 °F (37.1 °C) (03/26/18 0725)  Pulse: 100 (03/26/18 0725)  Resp: 20 (03/26/18 0725)  BP: 133/75 (03/26/18 0725)  SpO2: (!) 91 % (03/26/18 0725) Vital Signs (24h Range):  Temp:  [96.6 °F (35.9 °C)-99.4 °F (37.4 °C)] 98.8 °F (37.1 °C)  Pulse:  [] 100  Resp:  [18-22] 20  SpO2:  [87 %-97 %] 91 %  BP: (130-147)/(66-75) 133/75     Weight: 72.6 kg (160 lb 0.9 oz)  Body mass index is 23.64 kg/m².    Intake/Output Summary (Last 24 hours) at 03/26/18 1426  Last data filed at 03/26/18 0437   Gross per 24 hour   Intake                0 ml   Output             2100 ml   Net            -2100 ml      Physical Exam   Constitutional: He is oriented to person, place, and time. He appears well-developed and well-nourished. No distress.   HENT:   Head: Normocephalic and atraumatic.   Eyes: Conjunctivae are normal.   Neck: Normal range of motion. Neck supple.   Cardiovascular: Normal rate, regular rhythm, normal heart sounds and intact distal pulses.     Pulmonary/Chest: Effort normal. No respiratory distress. He has rales (right middle to lower lung zones).   Abdominal: Soft. He exhibits no distension. There is no tenderness.   Musculoskeletal: He exhibits edema (1+ pitting edema just above ankles b/l).   Neurological: He is alert and oriented to person, place, and time.   Skin: There is pallor.   Psychiatric: He has a normal mood and affect. His behavior is normal.       Significant Labs:   BMP:   Recent Labs  Lab 03/26/18  0537   GLU 93      K 4.5      CO2 25   BUN 17   CREATININE 1.5*   CALCIUM 9.0   MG 2.1     CBC:   Recent Labs  Lab 03/25/18  1137 03/26/18  0537   WBC 8.45 8.18   HGB 7.5* 7.3*   HCT 23.7* 23.4*    210       Significant Imaging: I have reviewed and interpreted all pertinent imaging results/findings within the past 24 hours.

## 2018-03-26 NOTE — PLAN OF CARE
[facility]    HOME HEALTH ORDERS  FACE TO FACE ENCOUNTER    Patient Name: Rodolfo Messina  YOB: 1950    PCP: Deric Claudio MD   PCP Address: [address]  / [place] [address]  PCP Phone Number: [phone]  PCP Fax: [phone]    Encounter Date: 03/26/2018    Admit to Home Health    Diagnoses:  Active Hospital Problems    Diagnosis  POA    Hospital-acquired pneumonia [J18.9]  Yes    Lower extremity edema [R60.0]  Unknown    Endocarditis [I38]  Yes    Chronic cough [R05]  Yes    Anemia [D64.9]  Yes    HTN (hypertension) [I10]  Yes     Chronic      Resolved Hospital Problems    Diagnosis Date Resolved POA   No resolved problems to display.       Future Appointments  Date Time Provider Department Center   3/27/2018 10:00 AM Leo Diaz MD [facility] ID Nam y   3/29/2018 2:00 PM Lisandro Bettencourt MD [facility] GASTRO [facility]   4/25/2018 3:00 PM Mayo Sandhu MD [facility] VOI THOMAS [facility]   5/3/2018 3:20 PM Pearl Borrego MD [facility] MIKE [facility]   6/14/2018 9:00 AM LAB, HEMONC SAME DAY [facility] LAB FANY [facility]   6/14/2018 10:00 AM Bob Borrego MD [facility] BM RIOS Jarvis [facility]           I have seen and examined this patient face to face today. My clinical findings that support the need for the home health skilled services and home bound status are the following:  Medical restrictions requiring assistance of another human to leave home due to  IV infusion Needs.    Allergies:  Review of patient's allergies indicates:   Allergen Reactions    Augmentin [amoxicillin-pot clavulanate]      Patient felt that it raised BP and requested to have it added as intolerance. Tolerated unasyn    Ciprofloxacin     Flagyl [metronidazole]     Lisinopril Other (See Comments)     cough    Mysoline [primidone]     Omnicef [cefdinir]     Shellfish containing products        Diet: regular diet    Activities: activity as tolerated    Nursing:   SN to complete comprehensive assessment  including routine vital signs. Instruct on disease process and s/s of complications to report to MD. Review/verify medication list sent home with the patient at time of discharge  and instruct patient/caregiver as needed. Frequency may be adjusted depending on start of care date.    Notify MD if SBP > 160 or < 90; DBP > 90 or < 50; HR > 120 or < 50; Temp > 101      MISCELLANEOUS CARE:  Home Infusion Therapy:   SN to perform Infusion Therapy/Central Line Care.  Review Central Line Care & Central Line Flush with patient.    Administer (drug and dose): Ampicllin 2 grams every 4 hours until April 12 and Rocephin 2 g q 12 hours until April 12  Last dose given: 1000 ampicillin, 0400 rocephin                        Home dose due: 1400 ampicillin, 1700 rocephin    Scrub the Hub: Prior to accessing the line, always perform a 30 second alcohol scrub  Each lumen of the central line is to be flushed at least daily with 10 mL Normal Saline and 3 mL Heparin flush (100 units/mL)  Skilled Nurse (SN) may draw blood from IV access  Blood Draw Procedure:   - Aspirate at least 5 mL of blood   - Discard   - Obtain specimen   - Change posiflow cap   - Flush with 20 mL Normal Saline followed by a                 3-5 mL Heparin flush (100 units/mL)  Central :   - Sterile dressing changes are done weekly and as needed.   - Use chlor-hexadine scrub to cleanse site, apply Biopatch to insertion site,       apply securement device dressing   - Posi-flow caps are changed weekly and after EVERY lab draw.   - If sterile gauze is under dressing to control oozing,                 dressing change must be performed every 24 hours until gauze is not needed.      Medications: Review discharge medications with patient and family and provide education.      Current Discharge Medication List      START taking these medications    Details   promethazine-codeine 6.25-10 mg/5 ml (PHENERGAN WITH CODEINE) 6.25-10 mg/5 mL syrup Take 7.5 mLs by  mouth every 4 (four) hours as needed for Cough.  Qty: 1 Bottle, Refills: 0         CONTINUE these medications which have NOT CHANGED    Details   albuterol-ipratropium 2.5mg-0.5mg/3mL (DUO-NEB) 0.5 mg-3 mg(2.5 mg base)/3 mL nebulizer solution 3 mLs every 4 (four) hours as needed.       AMPICILLIN SODIUM (AMPICILLIN 2 G/100 ML NS, READY TO MIX SYSTEM,) Until April 12 Inject 100 mLs (2 g total) into the vein every 4 (four) hours.  Qty: 26643 mL, Refills: 1      benzonatate (TESSALON) 100 MG capsule Take 100 mg by mouth 3 (three) times daily as needed for Cough.      cefTRIAXone 2 g in dextrose 5 % 50 mL (ROCEPHIN) 2 g/50 mL PgBk IVPB Until April 12 Inject 50 mLs (2 g total) into the vein every 12 (twelve) hours.  Qty: 60 each, Refills: 1      furosemide (LASIX) 40 MG tablet Take 1 tablet (40 mg total) by mouth once daily.  Qty: 30 tablet, Refills: 1      loratadine (CLARITIN) 10 mg tablet Take 10 mg by mouth once daily.      mirtazapine (REMERON) 30 MG tablet Take 1 tablet (30 mg total) by mouth every evening.  Qty: 30 tablet, Refills: 3      tamsulosin (FLOMAX) 0.4 mg Cp24 Take 2 capsules (0.8 mg total) by mouth 2 (two) times daily.  Qty: 120 capsule, Refills: 11      VENTOLIN HFA 90 mcg/actuation inhaler          STOP taking these medications       amLODIPine (NORVASC) 10 MG tablet Comments:   Reason for Stopping:         CHERATUSSIN AC  mg/5 mL syrup Comments:   Reason for Stopping:         guaifenesin-codeine 100-10 mg/5 ml (CHERATUSSIN AC)  mg/5 mL syrup Comments:   Reason for Stopping:               I certify that this patient is confined to his home and needs intermittent skilled nursing care.

## 2018-03-26 NOTE — PROGRESS NOTES
[facility] Hospital Medicine  Progress Note    Patient Name: Rodolfo Messina  MRN: [id number]  Patient Class: IP- Inpatient   Admission Date: 3/25/2018  Length of Stay: 1 days  Attending Physician: Lilian Williamson MD  Primary Care Provider: Deric Claudio MD    [facility] Medicine Team: [facility] MED 4 Calvin Magaña MD    Subjective:     Principal Problem:<principal problem not specified>    HPI:  Mr. Reynolds is a 67 year old man with HTN and recently diagnosed with endocarditis, mod-severe AR, severe MR on SOWMYA (2/27), and anemia who presents to the ED for 5 days of lower extremity swelling.  Patient noticed lower extremity swelling that started this past Wednesday, for which he doubled his lasix dose (40mg qday to 40mg BID), but did not see any improvement.  He denies any pain to BLE, SOB or CP.  The Norvasc is a new medication since last discharge. He does report having a chronic, dry, non-productive cough since December that prevents him from laying flat because it worsens.  Noted to be relieved by chewing spearmint gum (witnessed during visit). 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  During his most recent admission, a CT chest found multiple nodules (<1cm) scattered throughout the lung. He was evaluated by pulmonology on 3/16 for this cough who recommended repeat CT in 3 months.  Reports fatigue but denies fevers, chills, appetite change, or weight loss.     Patient was recently hospitalized 2/21-3/6 for acute hypoxemic respiratory failure 2/2 cardiogenic edema and found to have endocarditis and enterococcus faecalis bacteremia.  During that admission, he had spent some time in the ICU, as well.  He was discharged with 6 weeks of antibiotics for endocarditis treatment.  In January 2018, he was admitted for symptomatic anemia and had an EGD and C scope (2/1/18) that showed non bleeding erosive gastropathy, internal hemorrhoid and  diverticulosis in sigmoid and descending colon.           Hospital Course:  Patient admitted to hospital medicine on 3/25/18 for BLE swelling and concern for pneumonia.  In the ED, patient afebrile, no leukocytosis, normal procalcitonin, but CXR showed patchy consolidation in right middle to lower lung field. BLE US showed no signs of DVT. Hb was 7.5 but patient without signs of GIB and VSS stable.  He was given one dose of Vanc in the ED, and then started back on his home antibiotic regimen of Ampicillin and Rocephin.  Echo pending.     Interval History: NAEON.     Review of Systems   Constitutional: Positive for fatigue. Negative for appetite change, chills, diaphoresis, fever (subjective) and unexpected weight change.   HENT: Negative for congestion, sore throat and trouble swallowing.    Eyes: Negative for photophobia and visual disturbance.   Respiratory: Positive for cough. Negative for chest tightness and shortness of breath.    Cardiovascular: Positive for leg swelling. Negative for chest pain and palpitations.   Gastrointestinal: Negative for abdominal pain, blood in stool, constipation, diarrhea, nausea and vomiting.   Genitourinary: Negative for decreased urine volume and difficulty urinating.   Musculoskeletal: Negative for arthralgias and back pain.   Skin: Negative for color change and pallor.   Neurological: Negative for dizziness, light-headedness and headaches.   Psychiatric/Behavioral: Negative for agitation, behavioral problems and confusion.     Objective:     Vital Signs (Most Recent):  Temp: 98.8 °F (37.1 °C) (03/26/18 0725)  Pulse: 100 (03/26/18 0725)  Resp: 20 (03/26/18 0725)  BP: 133/75 (03/26/18 0725)  SpO2: (!) 91 % (03/26/18 0725) Vital Signs (24h Range):  Temp:  [96.6 °F (35.9 °C)-99.4 °F (37.4 °C)] 98.8 °F (37.1 °C)  Pulse:  [] 100  Resp:  [18-22] 20  SpO2:  [87 %-97 %] 91 %  BP: (130-147)/(66-75) 133/75     Weight: 72.6 kg (160 lb 0.9 oz)  Body mass index is 23.64  kg/m².    Intake/Output Summary (Last 24 hours) at 03/26/18 1426  Last data filed at 03/26/18 0437   Gross per 24 hour   Intake                0 ml   Output             2100 ml   Net            -2100 ml      Physical Exam   Constitutional: He is oriented to person, place, and time. He appears well-developed and well-nourished. No distress.   HENT:   Head: Normocephalic and atraumatic.   Eyes: Conjunctivae are normal.   Neck: Normal range of motion. Neck supple.   Cardiovascular: Normal rate, regular rhythm, normal heart sounds and intact distal pulses.    Pulmonary/Chest: Effort normal. No respiratory distress. He has rales (right middle to lower lung zones).   Abdominal: Soft. He exhibits no distension. There is no tenderness.   Musculoskeletal: He exhibits edema (1+ pitting edema just above ankles b/l).   Neurological: He is alert and oriented to person, place, and time.   Skin: There is pallor.   Psychiatric: He has a normal mood and affect. His behavior is normal.       Significant Labs:   BMP:   Recent Labs  Lab 03/26/18  0537   GLU 93      K 4.5      CO2 25   BUN 17   CREATININE 1.5*   CALCIUM 9.0   MG 2.1     CBC:   Recent Labs  Lab 03/25/18  1137 03/26/18  0537   WBC 8.45 8.18   HGB 7.5* 7.3*   HCT 23.7* 23.4*    210       Significant Imaging: I have reviewed and interpreted all pertinent imaging results/findings within the past 24 hours.    Assessment/Plan:      Lower extremity edema    Worsening BLE for past 5 days despite increasing home Lasix 40mg qday to 40mg BID.  - Could be s/e of Norvasc or worsening HF from endocarditis, although pt not having SOB and CXR not consistent with pulmonary edema  - Hold norvasc  - BLE US negative for DVT  - 2D echo with color flow doppler            Endocarditis    Diagnosed by SOWMYA on 2/27 presumably from enterococcus faecalis bacteremia.   - Has mod-severe AR and severe MR  - Needs CTS and SOWMYA follow up in 4 weeks (which is now)  - Has ID f/u on  3/27/18  - Last negative BCx 3/1 so receiving 6 weeks of Rocephin and Ampicillin (end date 4/12)  - Continue Rocephin 2g IV BID and Ampicillin 2g IV q4h          Chronic cough    Chronic, dry, non-productive cough since December and 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  - Used to take lisinopril, which was discontinued, but no improvement in cough   - 2/22 CT chest significant for multiple pulmonary nodules <1cm scattered throughout  - Saw pulmonary on 3/16 who recommend repeat CT chest in 3 months  - Sputum culture  - 3/25 CXR show patchy consolidation over the right mid and lower lung zones concerning for infection  - Procal normal 0.15, pt afebrile, and no leukocytosis so unclear whether this is a pneumonia or maybe septic emboli?  - Repeat CT with RLL and RML consolidations and air space disease.   - Pulmonary consulted for thoracostomy   - MOHAN hose for lower limb edema.           Anemia    Hb 7.5 upon admission (last Hb 9.1 on 3/15); Pt denies hematochezia or melena.  -  2/1/18 EGD and C scope showed non-bleeding erosive gastropathy, internal hemorrhoid and diverticulosis in sigmoid and descending colon  - Has GI appt on 3/29/18 this week for f/u  - F/u Iron studies   - F/u UA to rule out hematuria  - Will continue to monitor Hb and transfuse Hb < 7            HTN (hypertension)    Takes Lasix 40mg qday and Norvasc 10mg qday at home.  - Continue Lasix and hold norvasc 2/2 LE edema  - Consider discharging patient on thiazide  - Hydralazine 25mg prn            VTE Risk Mitigation         Ordered     enoxaparin injection 40 mg  Daily     Route:  Subcutaneous        03/26/18 0635     Place MOHAN hose  Until discontinued      03/26/18 0950     IP VTE LOW RISK PATIENT  Once      03/25/18 1354              Calvin Magaña MD  Department of Hospital Medicine   [facility]

## 2018-03-26 NOTE — PLAN OF CARE
Problem: Patient Care Overview  Goal: Plan of Care Review  Outcome: Ongoing (interventions implemented as appropriate)  POC reviewed with pt and spouse. AAO x4.  Sputum culture sent. Pulmonary consulted. c/o cough relieved by PRN cough meds. Pt remained free from falls. Questions and concerns addressed. Pt progressing towards goals. Will continue to monitor. See flow sheets for full assessment and VS

## 2018-03-26 NOTE — CONSULTS
[facility]  Critical Care - Medicine  Consult Note    Patient Name: Rodolfo Messina  MRN: [id number]  Admission Date: 3/25/2018  Hospital Length of Stay: 1 days  Code Status: Full Code  Attending Physician: Lliian Williamson MD  Primary Care Provider: Deric Claudio MD   Principal Problem: <principal problem not specified>    Consults  Subjective:   This is a 67 year-old male who was recently discharged from the hospital on 3/6 after being dx with endocarditis. He has been on rocephin and ampicillin at home for the past few weeks. Plan is to continue for 6 weeks. He presented to the ED for LE swelling and a cough. The patient describes his cough as dry and persistent for the past few months. He no brought up much with his cough. No SOB or fevers recently. His cough is better with laying spearmint gum. He was seen by ENT for his cough last week and no upper airway issues found. Upon arrival here, CXR showed a new right sided consolidation. CT scan shows small left effusion and consolidation. Pulmonary was consulted to help with management.    Past Medical History:   Diagnosis Date    ANNA (acute kidney injury) 2/22/2018    Anemia     Anxiety     BPH (benign prostatic hyperplasia)     Diverticulosis     Hypertension     Urinary retention        Past Surgical History:   Procedure Laterality Date    APPENDECTOMY      COLONOSCOPY      COLONOSCOPY N/A 2/1/2018    Procedure: COLONOSCOPY;  Surgeon: Richar Payan MD;  Location: [address];  Service: Endoscopy;  Laterality: N/A;  PM prep    EYE SURGERY Right     cataract extraction    FINGER SURGERY      FRACTURE SURGERY Right     index finger    TONSILLECTOMY         Review of patient's allergies indicates:   Allergen Reactions    Augmentin [amoxicillin-pot clavulanate]      Patient felt that it raised BP and requested to have it added as intolerance. Tolerated unasyn    Ciprofloxacin     Flagyl [metronidazole]      Lisinopril Other (See Comments)     cough    Mysoline [primidone]     Omnicef [cefdinir]     Shellfish containing products        Family History     Problem Relation (Age of Onset)    Heart disease Mother, Father    No Known Problems Sister, Brother    Stroke Mother        Social History Main Topics    Smoking status: Never Smoker    Smokeless tobacco: Never Used    Alcohol use Yes      Comment: none recently    Drug use: No    Sexual activity: Not Currently     Partners: Female      Review of Systems   Constitutional: Positive for activity change, appetite change and unexpected weight change. Negative for chills, diaphoresis, fatigue and fever.   HENT: Negative for congestion and dental problem.    Eyes: Negative for discharge.   Respiratory: Positive for cough and choking. Negative for apnea, chest tightness, shortness of breath and stridor.    Cardiovascular: Positive for leg swelling. Negative for chest pain and palpitations.   Gastrointestinal: Negative for abdominal distention and abdominal pain.   Endocrine: Negative for cold intolerance.   Genitourinary: Positive for difficulty urinating.   Musculoskeletal: Positive for arthralgias.   Neurological: Negative for dizziness and facial asymmetry.   Hematological: Negative for adenopathy.        Objective:     Vital Signs (Most Recent):  Temp: 98.8 °F (37.1 °C) (03/26/18 0725)  Pulse: 100 (03/26/18 0725)  Resp: 20 (03/26/18 0725)  BP: 133/75 (03/26/18 0725)  SpO2: (!) 91 % (03/26/18 0725) Vital Signs (24h Range):  Temp:  [96.6 °F (35.9 °C)-99.4 °F (37.4 °C)] 98.8 °F (37.1 °C)  Pulse:  [100-110] 100  Resp:  [18-20] 20  SpO2:  [87 %-96 %] 91 %  BP: (130-147)/(66-75) 133/75     Weight: 72.6 kg (160 lb 0.9 oz)  Body mass index is 23.64 kg/m².      Intake/Output Summary (Last 24 hours) at 03/26/18 1614  Last data filed at 03/26/18 0437   Gross per 24 hour   Intake                0 ml   Output             1650 ml   Net            -1650 ml       Physical Exam    Constitutional: He is oriented to person, place, and time.   Elderly gentleman   HENT:   Head: Normocephalic.   Temporal wasting   Neck: Normal range of motion. Neck supple.   Cardiovascular: Normal rate and regular rhythm.    Pulmonary/Chest: Effort normal.   Crackles on right   Abdominal: Soft.   Musculoskeletal: Normal range of motion. He exhibits no edema.   Neurological: He is alert and oriented to person, place, and time.       Vents:       Lines/Drains/Airways     Peripherally Inserted Central Catheter Line                 PICC Double Lumen 03/05/18 1539 right basilic 20 days          Peripheral Intravenous Line                 Peripheral IV - Single Lumen 03/25/18 1137 Left Forearm 1 day                Significant Labs:    CBC/Anemia Profile:    Recent Labs  Lab 03/25/18 1137 03/26/18  0537   WBC 8.45 8.18   HGB 7.5* 7.3*   HCT 23.7* 23.4*    210   MCV 92 93   RDW 18.3* 18.1*   IRON 21*  --    FERRITIN 836*  --         Chemistries:    Recent Labs  Lab 03/25/18 1137 03/26/18  0537    139   K 4.4 4.5    106   CO2 23 25   BUN 18 17   CREATININE 1.4 1.5*   CALCIUM 9.1 9.0   ALBUMIN 2.6* 2.3*   PROT 6.9 6.0   BILITOT 0.5 0.4   ALKPHOS 66 58   ALT 20 17   AST 27 22   MG 2.0 2.1           Assessment/Plan:   1. Right lower lobe PNA      The patient was recently discharged from the hospital after being dx with endocarditis from E.[facility]calis. C-scope w/o any issues and reason for his bacteremia was never found. He was supposed to have a cystoscopy on 3/22 which has not happened. Now he re-presents with cough and a new right sided pulmonary opacity. He is not immunosuppressed. Pt has had a PICC line for 3 weeks now. New studies--2-D echo shows mitral and tricuspid veg. Distribution and characteristics of this opacity do not look like it could be related to septic emboli but given the persistent vegetation is maybe. Plan:    -Check blood, resp and urine cultures  -Consider cytoscopy to R/O   malignancy. Consult urology  -Cover for HAP (add pseudomonal coverage). Consult ID.  -Too small of an effusion for thora--will follow with u/s  -DVT prophy      Thanks for the consult. Please call for questions.    Charlotte Michaels MD  Critical Care - Medicine  [facility]

## 2018-03-26 NOTE — PROGRESS NOTES
Pharmacist Renal Dose Adjustment Note    Rodolfo Messina is a 67 y.o. male being treated with ampicillin for endocarditis    Patient Data:    Vital Signs (Most Recent):  Temp: 98.8 °F (37.1 °C) (03/26/18 0725)  Pulse: 100 (03/26/18 0725)  Resp: 20 (03/26/18 0725)  BP: 133/75 (03/26/18 0725)  SpO2: (!) 91 % (03/26/18 0725)   Vital Signs (72h Range):  Temp:  [96.6 °F (35.9 °C)-99.4 °F (37.4 °C)]   Pulse:  []   Resp:  [15-26]   BP: (130-147)/(66-75)   SpO2:  [87 %-99 %]        Recent Labs     Lab 03/25/18  1137 03/26/18  0537   CREATININE 1.4 1.5*     Serum creatinine: 1.5 mg/dL (H) 03/26/18 0537  Estimated creatinine clearance: 47.8 mL/min (A)    Based on CrCl, ampicillin will be adjusted from 2 g q4h to 2 g q6h.     Leyda Sparks, PharmD, BCPS  Internal Medicine Clinical Pharmacy Specialist  Spectra link: [phone]

## 2018-03-26 NOTE — PT/OT/SLP EVAL
Physical Therapy Evaluation and Discharge Note    Patient Name:  Rodolfo Messina   MRN:  [id number]    Recommendations:     Discharge Recommendations:  home   Discharge Equipment Recommendations: none   Barriers to discharge: None    Assessment:     Rodolfo Messina is a 67 y.o. male admitted with a medical diagnosis of <principal problem not specified>. .  At this time, patient is functioning at their prior level of function and does not require further acute PT services.     Recent Surgery: * No surgery found *      Plan:     During this hospitalization, patient does not require further acute PT services.  Please re-consult if situation changes.     Plan of Care Reviewed with:      Subjective     Communicated with nurse prior to session.  Patient found sitting in transport w/c upon PT entry to room, agreeable to evaluation.      Chief Complaint: none stated  Patient comments/goals: return home  Pain/Comfort:  · Pain Rating 1: 0/10    Patients cultural, spiritual, Evangelical conflicts given the current situation: none stated    Living Environment:  Patient lives in a single story house w/ his wife, 0STE    Prior to admission, patients level of function was Indpendent.  Patient has the following equipment: none.  Upon discharge, patient will have assistance from wife.    Objective:     Patient found with: telemetry, peripheral IV     General Precautions: Standard, fall   Orthopedic Precautions:N/A, LUE partial weight bearing   Braces:       Exams:  · Cognitive Exam:  Patient is oriented to Person, Place, Time and Situation and follows 100% of all commands   · Fine Motor Coordination:    · -       Intact  · Gross Motor Coordination:  WFL  · Sensation:    · -       Intact  · RLE ROM: WFL  · RLE Strength: WFL  · LLE ROM: WFL  · LLE Strength: WFL    Functional Mobility:  · Transfers:  Sit to Stand:  stand by assistance with no AD  · Gait: 20', 120'  · SBA w/ no AD  · Balance: No balance deficits    AM-PAC 6 CLICK  MOBILITY  Total Score:23       Therapeutic Activities and Exercises:   PT Eval completed    Patient left in bathroom with all lines intact, call button in reach and nurse notified.    GOALS:    Physical Therapy Goals     Not on file          Multidisciplinary Problems (Resolved)        Problem: Physical Therapy Goal    Goal Priority Disciplines Outcome Goal Variances Interventions   Physical Therapy Goal   (Resolved)     PT/OT, PT Outcome(s) achieved                     History:     Past Medical History:   Diagnosis Date    ANNA (acute kidney injury) 2/22/2018    Anemia     Anxiety     BPH (benign prostatic hyperplasia)     Diverticulosis     Hypertension     Urinary retention        Past Surgical History:   Procedure Laterality Date    APPENDECTOMY      COLONOSCOPY      COLONOSCOPY N/A 2/1/2018    Procedure: COLONOSCOPY;  Surgeon: Richar Payan MD;  Location: [address];  Service: Endoscopy;  Laterality: N/A;  PM prep    EYE SURGERY Right     cataract extraction    FINGER SURGERY      FRACTURE SURGERY Right     index finger    TONSILLECTOMY         Clinical Decision Making:     History  Co-morbidities and personal factors that may impact the plan of care Examination  Body Structures and Functions, activity limitations and participation restrictions that may impact the plan of care Clinical Presentation   Decision Making/ Complexity Score   Co-morbidities:   [] Time since onset of injury / illness / exacerbation  [] Status of current condition  []Patient's cognitive status and safety concerns    [] Multiple Medical Problems (see med hx)  Personal Factors:   [] Patient's age  [] Prior Level of function   [] Patient's home situation (environment and family support)  [] Patient's level of motivation  [] Expected progression of patient      HISTORY:(criteria)    [] 58362 - no personal factors/history    [] 81535 - has 1-2 personal factor/comorbidity     [] 89554 - has >3 personal  factor/comorbidity     Body Regions:  [] Objective examination findings  [] Head     []  Neck  [] Trunk   [] Upper Extremity  [] Lower Extremity    Body Systems:  [] For communication ability, affect, cognition, language, and learning style: the assessment of the ability to make needs known, consciousness, orientation (person, place, and time), expected emotional /behavioral responses, and learning preferences (eg, learning barriers, education  needs)  [] For the neuromuscular system: a general assessment of gross coordinated movement (eg, balance, gait, locomotion, transfers, and transitions) and motor function  (motor control and motor learning)  [] For the musculoskeletal system: the assessment of gross symmetry, gross range of motion, gross strength, height, and weight  [] For the integumentary system: the assessment of pliability(texture), presence of scar formation, skin color, and skin integrity  [] For cardiovascular/pulmonary system: the assessment of heart rate, respiratory rate, blood pressure, and edema     Activity limitations:    [] Patient's cognitive status and saf ety concerns          [] Status of current condition      [] Weight bearing restriction  [] Cardiopulmunary Restriction    Participation Restrictions:   [] Goals and goal agreement with the patient     [] Rehab potential (prognosis) and probable outcome      Examination of Body System: (criteria)    [] 51529 - addressing 1-2 elements    [] 81162 - addressing a total of 3 or more elements     [] 14978 -  Addressing a total of 4 or more elements         Clinical Presentation: (criteria)  Stable - 83103     On examination of body system using standardized tests and measures patient presents with 1-2 elements from any of the following: body structures and functions, activity limitations, and/or participation restrictions.  Leading to a clinical presentation that is considered stable and/or uncomplicated                              Clinical  Decision Making  (Eval Complexity):  Low- 01083     Time Tracking:     PT Received On: 03/26/18  PT Start Time: 0837     PT Stop Time: 0847  PT Total Time (min): 10 min     Billable Minutes: Evaluation 10 Min      Luís Rocha, PT  03/26/2018

## 2018-03-26 NOTE — PLAN OF CARE
TRENT assigned to case today 3/26/2018. TRENT will assist team with DC needs. TRNET in communication with CM.    TRENT sent orders via [facility] to [facility] for patient to resume care.   Plan is for pt to DC later today vs rayna.     Harika Kidd, BRIAN  [phone]

## 2018-03-27 ENCOUNTER — ANESTHESIA EVENT (OUTPATIENT)
Dept: MEDSURG UNIT | Facility: HOSPITAL | Age: 68
End: 2018-03-27

## 2018-03-27 LAB
ALBUMIN SERPL BCP-MCNC: 2.2 G/DL
ALP SERPL-CCNC: 59 U/L
ALT SERPL W/O P-5'-P-CCNC: 16 U/L
ANION GAP SERPL CALC-SCNC: 10 MMOL/L
AST SERPL-CCNC: 21 U/L
BASOPHILS # BLD AUTO: 0.06 K/UL
BASOPHILS NFR BLD: 0.8 %
BILIRUB SERPL-MCNC: 0.4 MG/DL
BUN SERPL-MCNC: 19 MG/DL
CALCIUM SERPL-MCNC: 8.7 MG/DL
CHLORIDE SERPL-SCNC: 106 MMOL/L
CO2 SERPL-SCNC: 23 MMOL/L
CREAT SERPL-MCNC: 1.4 MG/DL
DIFFERENTIAL METHOD: ABNORMAL
EOSINOPHIL # BLD AUTO: 0.6 K/UL
EOSINOPHIL NFR BLD: 8.3 %
ERYTHROCYTE [DISTWIDTH] IN BLOOD BY AUTOMATED COUNT: 17.9 %
EST. GFR  (AFRICAN AMERICAN): 59.7 ML/MIN/1.73 M^2
EST. GFR  (NON AFRICAN AMERICAN): 51.6 ML/MIN/1.73 M^2
GLUCOSE SERPL-MCNC: 97 MG/DL
HCT VFR BLD AUTO: 24.5 %
HGB BLD-MCNC: 7.5 G/DL
IMM GRANULOCYTES # BLD AUTO: 0.03 K/UL
IMM GRANULOCYTES NFR BLD AUTO: 0.4 %
LYMPHOCYTES # BLD AUTO: 0.7 K/UL
LYMPHOCYTES NFR BLD: 9.1 %
MAGNESIUM SERPL-MCNC: 2 MG/DL
MCH RBC QN AUTO: 28 PG
MCHC RBC AUTO-ENTMCNC: 30.6 G/DL
MCV RBC AUTO: 91 FL
MONOCYTES # BLD AUTO: 0.6 K/UL
MONOCYTES NFR BLD: 8 %
NEUTROPHILS # BLD AUTO: 5.4 K/UL
NEUTROPHILS NFR BLD: 73.4 %
NRBC BLD-RTO: 0 /100 WBC
PLATELET # BLD AUTO: 244 K/UL
PMV BLD AUTO: 10.4 FL
POTASSIUM SERPL-SCNC: 4.3 MMOL/L
PROT SERPL-MCNC: 5.8 G/DL
RBC # BLD AUTO: 2.68 M/UL
SODIUM SERPL-SCNC: 139 MMOL/L
WBC # BLD AUTO: 7.34 K/UL

## 2018-03-27 PROCEDURE — 63600175 PHARM REV CODE 636 W HCPCS: Performed by: STUDENT IN AN ORGANIZED HEALTH CARE EDUCATION/TRAINING PROGRAM

## 2018-03-27 PROCEDURE — 63600175 PHARM REV CODE 636 W HCPCS: Performed by: HOSPITALIST

## 2018-03-27 PROCEDURE — 87040 BLOOD CULTURE FOR BACTERIA: CPT | Mod: 59

## 2018-03-27 PROCEDURE — 11000001 HC ACUTE MED/SURG PRIVATE ROOM

## 2018-03-27 PROCEDURE — 87205 SMEAR GRAM STAIN: CPT

## 2018-03-27 PROCEDURE — 80053 COMPREHEN METABOLIC PANEL: CPT

## 2018-03-27 PROCEDURE — 92611 MOTION FLUOROSCOPY/SWALLOW: CPT

## 2018-03-27 PROCEDURE — 25000003 PHARM REV CODE 250: Performed by: STUDENT IN AN ORGANIZED HEALTH CARE EDUCATION/TRAINING PROGRAM

## 2018-03-27 PROCEDURE — 99232 SBSQ HOSP IP/OBS MODERATE 35: CPT | Mod: ,,, | Performed by: HOSPITALIST

## 2018-03-27 PROCEDURE — 97165 OT EVAL LOW COMPLEX 30 MIN: CPT

## 2018-03-27 PROCEDURE — 99222 1ST HOSP IP/OBS MODERATE 55: CPT | Mod: GC,,, | Performed by: INTERNAL MEDICINE

## 2018-03-27 PROCEDURE — 36415 COLL VENOUS BLD VENIPUNCTURE: CPT

## 2018-03-27 PROCEDURE — 63600175 PHARM REV CODE 636 W HCPCS: Performed by: INTERNAL MEDICINE

## 2018-03-27 PROCEDURE — 88112 CYTOPATH CELL ENHANCE TECH: CPT | Performed by: PATHOLOGY

## 2018-03-27 PROCEDURE — 25000003 PHARM REV CODE 250: Performed by: HOSPITALIST

## 2018-03-27 PROCEDURE — 85025 COMPLETE CBC W/AUTO DIFF WBC: CPT

## 2018-03-27 PROCEDURE — G0378 HOSPITAL OBSERVATION PER HR: HCPCS

## 2018-03-27 PROCEDURE — 87086 URINE CULTURE/COLONY COUNT: CPT

## 2018-03-27 PROCEDURE — 25000003 PHARM REV CODE 250: Performed by: INTERNAL MEDICINE

## 2018-03-27 PROCEDURE — 83735 ASSAY OF MAGNESIUM: CPT

## 2018-03-27 PROCEDURE — 94761 N-INVAS EAR/PLS OXIMETRY MLT: CPT

## 2018-03-27 PROCEDURE — 87070 CULTURE OTHR SPECIMN AEROBIC: CPT

## 2018-03-27 RX ORDER — PANTOPRAZOLE SODIUM 40 MG/1
40 TABLET, DELAYED RELEASE ORAL DAILY
Status: DISCONTINUED | OUTPATIENT
Start: 2018-03-27 | End: 2018-03-28 | Stop reason: HOSPADM

## 2018-03-27 RX ORDER — FUROSEMIDE 10 MG/ML
40 INJECTION INTRAMUSCULAR; INTRAVENOUS ONCE
Status: DISCONTINUED | OUTPATIENT
Start: 2018-03-27 | End: 2018-03-27

## 2018-03-27 RX ADMIN — ENOXAPARIN SODIUM 40 MG: 100 INJECTION SUBCUTANEOUS at 05:03

## 2018-03-27 RX ADMIN — FUROSEMIDE 40 MG: 40 TABLET ORAL at 08:03

## 2018-03-27 RX ADMIN — CEFTRIAXONE SODIUM 2 G: 2 INJECTION, POWDER, FOR SOLUTION INTRAMUSCULAR; INTRAVENOUS at 05:03

## 2018-03-27 RX ADMIN — TAMSULOSIN HYDROCHLORIDE 0.8 MG: 0.4 CAPSULE ORAL at 08:03

## 2018-03-27 RX ADMIN — AMPICILLIN SODIUM 2 G: 2 INJECTION, POWDER, FOR SOLUTION INTRAMUSCULAR; INTRAVENOUS at 08:03

## 2018-03-27 RX ADMIN — DOXYCYCLINE HYCLATE 100 MG: 100 TABLET, COATED ORAL at 08:03

## 2018-03-27 RX ADMIN — CETIRIZINE HYDROCHLORIDE 10 MG: 10 TABLET, FILM COATED ORAL at 08:03

## 2018-03-27 RX ADMIN — AMPICILLIN SODIUM 2 G: 2 INJECTION, POWDER, FOR SOLUTION INTRAMUSCULAR; INTRAVENOUS at 12:03

## 2018-03-27 RX ADMIN — CEFTRIAXONE SODIUM 2 G: 2 INJECTION, POWDER, FOR SOLUTION INTRAMUSCULAR; INTRAVENOUS at 04:03

## 2018-03-27 RX ADMIN — AMPICILLIN SODIUM 2 G: 2 INJECTION, POWDER, FOR SOLUTION INTRAMUSCULAR; INTRAVENOUS at 05:03

## 2018-03-27 RX ADMIN — MIRTAZAPINE 30 MG: 15 TABLET, FILM COATED ORAL at 09:03

## 2018-03-27 RX ADMIN — PROMETHAZINE HYDROCHLORIDE AND CODEINE PHOSPHATE 7.5 ML: 10; 6.25 SOLUTION ORAL at 12:03

## 2018-03-27 RX ADMIN — RAMELTEON 8 MG: 8 TABLET, FILM COATED ORAL at 09:03

## 2018-03-27 RX ADMIN — FAMOTIDINE 20 MG: 20 TABLET, FILM COATED ORAL at 08:03

## 2018-03-27 RX ADMIN — PANTOPRAZOLE SODIUM 40 MG: 40 TABLET, DELAYED RELEASE ORAL at 11:03

## 2018-03-27 RX ADMIN — AMPICILLIN SODIUM 2 G: 2 INJECTION, POWDER, FOR SOLUTION INTRAMUSCULAR; INTRAVENOUS at 02:03

## 2018-03-27 NOTE — ASSESSMENT & PLAN NOTE
Stable, unchanged, chronic  - DDx: Infection, pneumonia, septic emboli, malignancy  - Unclear cause of chronic cough at this time. CT Chest concerning with RLL and RML consolidations and air space disease, but no indication of systemic inflammation (no fever, no WBC, neg procal)  - Echo concerning for evolving vegetation and septic emboli. Previously (2/24/2018) it was noted that vegetations were likely present on mitral valve with mild to moderate valvular dysfunction, a work up that was triggered by consecutive blood cultures growing enterococcus faecalis. However, on SOWMYA performed 2/27/18 suggestive of mitral vegetation, w/ severe valvular dysfunction, in addition to vegetation on aortic valve. Most recent TTE now showing mitral valve vegetation and new tricuspid vegetation with tricuspid regurgitation.   - Continue broad spectrum therapy, repeat blood cultures obtained.     Consults  - Pulmonary consulted, appreciate recs.   - ID consulted. Will follow up recommendations.

## 2018-03-27 NOTE — SUBJECTIVE & OBJECTIVE
No current facility-administered medications on file prior to encounter.      Current Outpatient Prescriptions on File Prior to Encounter   Medication Sig    albuterol-ipratropium 2.5mg-0.5mg/3mL (DUO-NEB) 0.5 mg-3 mg(2.5 mg base)/3 mL nebulizer solution 3 mLs every 4 (four) hours as needed.     AMPICILLIN SODIUM (AMPICILLIN 2 G/100 ML NS, READY TO MIX SYSTEM,) Inject 100 mLs (2 g total) into the vein every 4 (four) hours.    benzonatate (TESSALON) 100 MG capsule Take 100 mg by mouth 3 (three) times daily as needed for Cough.    cefTRIAXone 2 g in dextrose 5 % 50 mL (ROCEPHIN) 2 g/50 mL PgBk IVPB Inject 50 mLs (2 g total) into the vein every 12 (twelve) hours.    furosemide (LASIX) 40 MG tablet Take 1 tablet (40 mg total) by mouth once daily.    loratadine (CLARITIN) 10 mg tablet Take 10 mg by mouth once daily.    mirtazapine (REMERON) 30 MG tablet Take 1 tablet (30 mg total) by mouth every evening.    tamsulosin (FLOMAX) 0.4 mg Cp24 Take 2 capsules (0.8 mg total) by mouth 2 (two) times daily.    VENTOLIN HFA 90 mcg/actuation inhaler        Review of patient's allergies indicates:   Allergen Reactions    Augmentin [amoxicillin-pot clavulanate]      Patient felt that it raised BP and requested to have it added as intolerance. Tolerated unasyn    Ciprofloxacin     Flagyl [metronidazole]     Lisinopril Other (See Comments)     cough    Mysoline [primidone]     Omnicef [cefdinir]     Shellfish containing products        Past Medical History:   Diagnosis Date    ANNA (acute kidney injury) 2/22/2018    Anemia     Anxiety     BPH (benign prostatic hyperplasia)     Diverticulosis     Hypertension     Urinary retention      Past Surgical History:   Procedure Laterality Date    APPENDECTOMY      COLONOSCOPY      COLONOSCOPY N/A 2/1/2018    Procedure: COLONOSCOPY;  Surgeon: Richar Payan MD;  Location: [address];  Service: Endoscopy;  Laterality: N/A;  PM prep    EYE SURGERY Right      cataract extraction    FINGER SURGERY      FRACTURE SURGERY Right     index finger    TONSILLECTOMY       Family History     Problem Relation (Age of Onset)    Heart disease Mother, Father    No Known Problems Sister, Brother    Stroke Mother        Social History Main Topics    Smoking status: Never Smoker    Smokeless tobacco: Never Used    Alcohol use Yes      Comment: none recently    Drug use: No    Sexual activity: Not Currently     Partners: Female     Review of Systems   Constitutional: Positive for activity change, appetite change and fatigue. Negative for fever.   HENT: Negative for congestion, dental problem, sneezing and sore throat.    Eyes: Negative for pain, discharge and visual disturbance.   Respiratory: Positive for cough and choking. Negative for shortness of breath and wheezing.    Cardiovascular: Positive for leg swelling. Negative for chest pain and palpitations.   Gastrointestinal: Negative for abdominal distention, abdominal pain, constipation, diarrhea, nausea and vomiting.   Endocrine: Negative for polydipsia, polyphagia and polyuria.   Genitourinary: Positive for difficulty urinating. Negative for dysuria, frequency and urgency.   Musculoskeletal: Positive for arthralgias. Negative for back pain and gait problem.   Skin: Negative for rash and wound.   Neurological: Negative for dizziness, seizures, syncope and headaches.   Hematological: Does not bruise/bleed easily.   Psychiatric/Behavioral: Negative for agitation and confusion. The patient is not nervous/anxious.      Objective:     Vital Signs (Most Recent):  Temp: 98.1 °F (36.7 °C) (03/27/18 1224)  Pulse: 95 (03/27/18 1224)  Resp: 18 (03/27/18 0755)  BP: 131/65 (03/27/18 1224)  SpO2: (!) 94 % (03/27/18 1224) Vital Signs (24h Range):  Temp:  [98.1 °F (36.7 °C)-99.4 °F (37.4 °C)] 98.1 °F (36.7 °C)  Pulse:  [] 95  Resp:  [18] 18  SpO2:  [94 %-96 %] 94 %  BP: (131-155)/(65-78) 131/65     Weight: 72.6 kg (160 lb 0.9 oz)  Body  mass index is 23.64 kg/m².    SpO2: (!) 94 %  O2 Device (Oxygen Therapy): nasal cannula     Intake/Output - Last 3 Shifts       03/25 0700 - 03/26 0659 03/26 0700 - 03/27 0659 03/27 0700 - 03/28 0659    P.O.  360 240    Total Intake(mL/kg)  360 (5) 240 (3.3)    Urine (mL/kg/hr) 2100 2000 (1.1)     Emesis/NG output  0 (0)     Stool  0 (0)     Total Output 2100 2000      Net -2100 -1640 +240           Urine Occurrence  0 x 2 x    Stool Occurrence  0 x 2 x    Emesis Occurrence  0 x 0 x           Lines/Drains/Airways     Peripherally Inserted Central Catheter Line                 PICC Double Lumen 03/05/18 1539 right basilic 21 days          Peripheral Intravenous Line                 Peripheral IV - Single Lumen 03/25/18 1137 Left Forearm 2 days                 STS Risk Score: ***    Physical Exam   Constitutional: He is oriented to person, place, and time. He appears well-developed and well-nourished.   HENT:   Head: Normocephalic and atraumatic.   Eyes: Pupils are equal, round, and reactive to light.   Neck: Normal range of motion. Neck supple.   Cardiovascular: Normal rate and regular rhythm.    Murmur heard.  Pulmonary/Chest: Effort normal and breath sounds normal.   Abdominal: Soft. Bowel sounds are normal.   Musculoskeletal: Normal range of motion. He exhibits edema.   Neurological: He is alert and oriented to person, place, and time.   Skin: Skin is warm and dry.   Psychiatric: He has a normal mood and affect. His behavior is normal.       Significant Labs:  CBC:   Recent Labs  Lab 03/27/18  0438   WBC 7.34   RBC 2.68*   HGB 7.5*   HCT 24.5*      MCV 91   MCH 28.0   MCHC 30.6*     CMP:   Recent Labs  Lab 03/27/18  0438   GLU 97   CALCIUM 8.7   ALBUMIN 2.2*   PROT 5.8*      K 4.3   CO2 23      BUN 19   CREATININE 1.4   ALKPHOS 59   ALT 16   AST 21   BILITOT 0.4       Significant Diagnostics:    SOWMYA 3/27/18- pending     2D ECHO 3/26/18   1 - Severe left atrial enlargement.     2 - Eccentric  hypertrophy.     3 - Normal left ventricular systolic function (EF 60-65%).     4 - Normal right ventricular systolic function .     5 - Moderate aortic regurgitation.     6 - Evidence of mitral vegetation .     7 - Severe mitral regurgitation.     8 - Moderate tricuspid regurgitation.     9 - Mobile structure on tricuspid valve suspicious for vegetation.     10 - Pulmonary hypertension. The estimated PA systolic pressure is 65 mmHg.     11 - Intermediate central venous pressure.     Chest CT 3/25/18  Multifocal consolidation and airspace disease in the right lower and middle lobes.  Associated small to moderate right pleural effusion.  Findings likely represent pneumonia or aspiration.  Septic emboli considered given recent endocarditis, but felt to be less likely given asymmetric distribution.  Malignancy is also felt to be less likely given significant interval worsening when compared with the recent prior exam.  However, correlation with clinical findings and follow-up to document resolution is recommended.    SOWMYA 2/27/18   1 - No visualized thrombus in the left atrial appendage.     2 - Left atrium is enlarged.     3 - Normal left ventricular systolic function (EF 65-70%).     4 - Moderate to severe aortic regurgitation.     5 - Severe mitral regurgitation.     6 - Echodensity visualized on the aortic valve consistent with vegetation ( see text).     7 - Echodensity visualized on the mitral valve consistent with vegetation ( see text).     8 - Grade 2 atheroma disease of aorta.

## 2018-03-27 NOTE — PLAN OF CARE
Problem: Infection, Risk/Actual (Adult)  Goal: Identify Related Risk Factors and Signs and Symptoms  Related risk factors and signs and symptoms are identified upon initiation of Human Response Clinical Practice Guideline (CPG)   Outcome: Ongoing (interventions implemented as appropriate)   03/27/18 0512   Infection, Risk/Actual   Related Risk Factors (Infection, Risk/Actual) poor personal hygiene   Signs and Symptoms (Infection, Risk/Actual) skin cool/clammy;respiratory rate increase;heart rate increase

## 2018-03-27 NOTE — ASSESSMENT & PLAN NOTE
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 Pt seen in ED on 3/11/2018, had repeat blood cultures which were NG TD x 5 days. Pt has now been readmitted for increased bilateral leg edema, thought to be related to amlodipine. Pt is afebrile and without leucocytosis, vital sign normal, resp culture inadequate for interpretation. CXR shows patchy consolidation over the right mid and lower lung zones that was not present on previous admission. This was also seen on CT scan. ID has been consulted regarding broadening out the pt's current regimen.     - pt appears to being doing well, does not appear toxic at this time.   - repeat ECHO is concerning since it now shows a mobile structure on the tricuspid valve that was not commented on with the previous ECHO with showed mitral and aortic vegetation.   - repeat blood cultures from 3/11/2018 have been clear  - no need to broaden coverage yet, would continue ampicillin and ceftriaxone  - recommend CT Surgery and Cardiology consult to re-evaluate valves and need for more emergent surgery.   - will continue to follow.

## 2018-03-27 NOTE — CONSULTS
[facility]  Infectious Disease  Consult Note    Patient Name: Rodolfo Messina  MRN: [id number]  Admission Date: 3/25/2018  Hospital Length of Stay: 2 days  Attending Physician: Lilian Williamson MD  Primary Care Provider: Deric Claudio MD     Isolation Status: No active isolations    Patient information was obtained from patient, past medical records and ER records.      Consults  Assessment/Plan:     Subacute bacterial endocarditis    Pt is a 67 year old man with HTN, urinary retention (indwelling lackey), chronic back pain, essential tremor, anxiety and depression, recently admitted from 2/21/2018 to 03/06/2018, found to have subacute bacterial endocarditis with Enterococcus faecalis, discharged home on ampicillin and ceftriaxone. Pt seen in ED on 3/11/2018, had repeat blood cultures which were NG TD x 5 days. Pt has now been readmitted for increased bilateral leg edema, thought to be related to amlodipine. Pt is afebrile and without leucocytosis, vital sign normal, resp culture inadequate for interpretation. CXR shows patchy consolidation over the right mid and lower lung zones that was not present on previous admission. This was also seen on CT scan. ID has been consulted regarding broadening out the pt's current regimen.     - pt appears to being doing well, does not appear toxic at this time.   - repeat ECHO is concerning since it now shows a mobile structure on the tricuspid valve that was not commented on with the previous ECHO with showed mitral and aortic vegetation.   - repeat blood cultures from 3/11/2018 have been clear  - no need to broaden coverage yet, would continue ampicillin and ceftriaxone  - recommend CT Surgery and Cardiology consult to re-evaluate valves and need for more emergent surgery.   - will continue to follow.             Thank you for your consult. I will follow-up with patient. Please contact us if you have any additional questions.    Guille Ramírez MD,  PhD  Infectious Disease, PGY-4  [facility]    Subjective:     Principal Problem: <principal problem not specified>    HPI: Pt is a 67 year old man with HTN, urinary retention (indwelling lackey), chronic back pain, essential tremor, anxiety and depression, recently admitted from 2/21/2018 to 03/06/2018. Pt was initially admitted for anemia, also had interval development of bilateral patchy interstitial and parietal attenuation reflective of edema vs infection. During his heart failure workup a SOWMYA revealed vegetations on the aortic and mitral valve with severe regurgitation of both. Blood cultures were positive to Enterococcus faecalis, sensitive to ampicillin. Pt was started on ampicillin and ceftriaxone for a six week course of therapy. Pt seen in ID clinic on 3/15/2018 where it was determined that he bilateral impacted cerumen (pt complaining of decreased hearing) and was continued on ampicillin and ceftriaxone. Around the time of 3/20/2018 pt began to complain of increased swelling in both legs and called ID office, thinking this may have a side effect of the antibiotic. Pt also spoke with PCP who increased his lasix dose to TID without relief. Pt has also recently been started on amlodipine, but states this has been removed a few days ago. Pt denies fever or chills, denies change in the nature of his cough ,states that it is a dry, non-productive cough. Pt states that he has been adherent to his home antibiotics, with his wife helping him. Denies recent travel, denies sick contacts.     Previous Positive Cultures  02/14/2018 UCx  Proteus mirabilis, Pan-S  02/25/2018 BCx, NSI E.faecalis, Amp-Vanc-S  02/27/2018 BCx, LFA E.faecalis  02/27/2018 BCx, RH  E.faecalis    Cultures this admission  03/25/2018 Resp cx Inadequate sample    Past Medical History:   Diagnosis Date    ANNA (acute kidney injury) 2/22/2018    Anemia     Anxiety     BPH (benign prostatic hyperplasia)     Diverticulosis      Hypertension     Urinary retention        Past Surgical History:   Procedure Laterality Date    APPENDECTOMY      COLONOSCOPY      COLONOSCOPY N/A 2/1/2018    Procedure: COLONOSCOPY;  Surgeon: Richar Payan MD;  Location: [facility] ([address] [place] [address]);  Service: Endoscopy;  Laterality: N/A;  PM prep    EYE SURGERY Right     cataract extraction    FINGER SURGERY      FRACTURE SURGERY Right     index finger    TONSILLECTOMY         Review of patient's allergies indicates:   Allergen Reactions    Augmentin [amoxicillin-pot clavulanate]      Patient felt that it raised BP and requested to have it added as intolerance. Tolerated unasyn    Ciprofloxacin     Flagyl [metronidazole]     Lisinopril Other (See Comments)     cough    Mysoline [primidone]     Omnicef [cefdinir]     Shellfish containing products        Medications:  Prescriptions Prior to Admission   Medication Sig    albuterol-ipratropium 2.5mg-0.5mg/3mL (DUO-NEB) 0.5 mg-3 mg(2.5 mg base)/3 mL nebulizer solution 3 mLs every 4 (four) hours as needed.     AMPICILLIN SODIUM (AMPICILLIN 2 G/100 ML NS, READY TO MIX SYSTEM,) Inject 100 mLs (2 g total) into the vein every 4 (four) hours.    benzonatate (TESSALON) 100 MG capsule Take 100 mg by mouth 3 (three) times daily as needed for Cough.    cefTRIAXone 2 g in dextrose 5 % 50 mL (ROCEPHIN) 2 g/50 mL PgBk IVPB Inject 50 mLs (2 g total) into the vein every 12 (twelve) hours.    furosemide (LASIX) 40 MG tablet Take 1 tablet (40 mg total) by mouth once daily.    loratadine (CLARITIN) 10 mg tablet Take 10 mg by mouth once daily.    mirtazapine (REMERON) 30 MG tablet Take 1 tablet (30 mg total) by mouth every evening.    tamsulosin (FLOMAX) 0.4 mg Cp24 Take 2 capsules (0.8 mg total) by mouth 2 (two) times daily.    VENTOLIN HFA 90 mcg/actuation inhaler     [DISCONTINUED] amLODIPine (NORVASC) 10 MG tablet Take 1 tablet (10 mg total) by mouth once daily.    [DISCONTINUED] CHERATUSSIN AC  mg/5 mL  syrup Take 10 mLs by mouth every 6 (six) hours as needed.    [DISCONTINUED] guaifenesin-codeine 100-10 mg/5 ml (CHERATUSSIN AC)  mg/5 mL syrup Take 5 mLs by mouth 3 (three) times daily as needed.     Antibiotics     Start     Stop Route Frequency Ordered    03/27/18 1241  ampicillin 2 g in sodium chloride 0.9 % 100 mL IVPB (ready to mix system)      -- IV Every 4 hours (non-standard times) 03/27/18 0940    03/25/18 1700  cefTRIAXone (ROCEPHIN) 2 g in dextrose 5 % 50 mL IVPB      -- IV Every 12 hours (non-standard times) 03/25/18 1553        Antifungals     None        Antivirals     None           Immunization History   Administered Date(s) Administered    Td - PF (ADULT) 08/24/2016       Family History     Problem Relation (Age of Onset)    Heart disease Mother, Father    No Known Problems Sister, Brother    Stroke Mother        Social History     Social History    Marital status:      Spouse name: N/A    Number of children: N/A    Years of education: N/A     Occupational History     [employer]     Social History Main Topics    Smoking status: Never Smoker    Smokeless tobacco: Never Used    Alcohol use Yes      Comment: none recently    Drug use: No    Sexual activity: Not Currently     Partners: Female     Other Topics Concern    None     Social History Narrative    None     Review of Systems   Constitutional: Negative for appetite change, chills, fatigue and fever.   HENT: Positive for congestion. Negative for sore throat and trouble swallowing.    Eyes: Negative for redness and visual disturbance.   Respiratory: Positive for cough. Negative for choking, shortness of breath and wheezing.    Cardiovascular: Positive for leg swelling. Negative for chest pain.   Gastrointestinal: Negative for abdominal distention, constipation, diarrhea, nausea and vomiting.   Endocrine: Negative for polyuria.   Genitourinary: Negative for dysuria and hematuria.   Musculoskeletal: Negative for arthralgias and  myalgias.   Skin: Negative for color change, pallor and rash.   Neurological: Negative for dizziness and weakness.   Psychiatric/Behavioral: Positive for agitation. Negative for behavioral problems and confusion.     Objective:     Vital Signs (Most Recent):  Temp: 98.1 °F (36.7 °C) (03/27/18 1224)  Pulse: 95 (03/27/18 1224)  Resp: 18 (03/27/18 0755)  BP: 131/65 (03/27/18 1224)  SpO2: (!) 94 % (03/27/18 1224) Vital Signs (24h Range):  Temp:  [98.1 °F (36.7 °C)-99.4 °F (37.4 °C)] 98.1 °F (36.7 °C)  Pulse:  [] 95  Resp:  [18] 18  SpO2:  [94 %-96 %] 94 %  BP: (131-155)/(65-78) 131/65     Weight: 72.6 kg (160 lb 0.9 oz)  Body mass index is 23.64 kg/m².    Estimated Creatinine Clearance: 51.2 mL/min (based on SCr of 1.4 mg/dL).    Physical Exam   Constitutional: He is oriented to person, place, and time. He appears well-developed and well-nourished.   HENT:   Head: Normocephalic.   Mouth/Throat: Oropharynx is clear and moist. No oropharyngeal exudate.   Eyes: Conjunctivae are normal. Pupils are equal, round, and reactive to light. Left eye exhibits no discharge. No scleral icterus.   Neck: No JVD present.   Cardiovascular: Normal rate and regular rhythm.  Exam reveals no gallop.    Murmur heard.  Pulmonary/Chest: Effort normal and breath sounds normal.   Abdominal: Soft. Bowel sounds are normal.   Musculoskeletal: He exhibits no edema, tenderness or deformity.   Lymphadenopathy:     He has no cervical adenopathy.   Neurological: He is alert and oriented to person, place, and time.   Skin: Skin is warm and dry. Capillary refill takes less than 2 seconds.       Significant Labs:   CBC:   Recent Labs  Lab 03/26/18  0537 03/27/18  0438   WBC 8.18 7.34   HGB 7.3* 7.5*   HCT 23.4* 24.5*    244     CMP:   Recent Labs  Lab 03/26/18  0537 03/27/18  0438    139   K 4.5 4.3    106   CO2 25 23   GLU 93 97   BUN 17 19   CREATININE 1.5* 1.4   CALCIUM 9.0 8.7   PROT 6.0 5.8*   ALBUMIN 2.3* 2.2*   BILITOT 0.4  0.4   ALKPHOS 58 59   AST 22 21   ALT 17 16   ANIONGAP 8 10   EGFRNONAA 47.5* 51.6*     Microbiology Results (last 7 days)     Procedure Component Value Units Date/Time    Blood culture [[id number]] Collected:  03/27/18 0623    Order Status:  Completed Specimen:  Blood Updated:  03/27/18 1515     Blood Culture, Routine No Growth to date    Blood culture [[id number]] Collected:  03/27/18 0623    Order Status:  Completed Specimen:  Blood Updated:  03/27/18 1515     Blood Culture, Routine No Growth to date    Urine culture [[id number]] Collected:  03/27/18 0606    Order Status:  Sent Specimen:  Urine from Urine, Clean Catch Updated:  03/27/18 0805    Culture, Respiratory with Gram Stain [[id number]]     Order Status:  No result Specimen:  Respiratory     Culture, Respiratory with Gram Stain [[id number]] Collected:  03/26/18 1225    Order Status:  Completed Specimen:  Respiratory from Sputum Updated:  03/26/18 1758     Respiratory Culture Specimen inadequate - culture not performed     Gram Stain (Respiratory) >10epis/lfp and <than many WBC's     Gram Stain (Respiratory) Predominance of oropharyngeal wendie. Please recollect.          Significant Imaging: I have reviewed all pertinent imaging results/findings within the past 24 hours.

## 2018-03-27 NOTE — PLAN OF CARE
Problem: SLP Goal  Goal: SLP Goal  Modified barium swallow study completed. No further ST recommended at this time. Continue regular diet/thin liquids at this time.   Sheila Barger CCC-SLP  3/27/2018

## 2018-03-27 NOTE — ASSESSMENT & PLAN NOTE
See cough.     - Diagnosed by SOWMYA on 2/27 presumably from enterococcus faecalis bacteremia.   - Has ID f/u on 3/27/18  - Last negative BCx 3/1 so receiving 6 weeks of Rocephin and Ampicillin (end date 4/12)  - Continue Rocephin 2g IV BID and Ampicillin 2g IV q4h

## 2018-03-27 NOTE — PROCEDURES
Modified Barium Swallow    Patient Name:  Rodolfo Messina   MRN:  [id number]      Recommendations:     Recommendations:                General Recommendations:  Follow-up not indicated  Diet recommendations:  Regular, Thin   Aspiration Precautions: 1 bite/sip at a time, Feed only when awake/alert, HOB to 90 degrees, Monitor for s/s of aspiration, Small bites/sips and Standard aspiration precautions   General Precautions: Standard, aspiration, fall      Referral     Reason for Referral  Patient was referred for a Modified Barium Swallow Study to assess the efficiency of his/her swallow function, rule out aspiration and make recommendations regarding safe dietary consistencies, effective compensatory strategies, and safe eating environment.     Diagnosis: <principal problem not specified>       History:     Past Medical History:   Diagnosis Date    ANNA (acute kidney injury) 2/22/2018    Anemia     Anxiety     BPH (benign prostatic hyperplasia)     Diverticulosis     Hypertension     Urinary retention        Objective:     Current Respiratory Status: 03/27/18    Alert: yes    Cooperative: yes    Follows Directions: yes    Visualization  · Patient was seen in the lateral view    Oral Peripheral Examination  · Oral Musculature: WFL  · Dentition: present and adequate  · Mandibular Strength and Mobility: WFL  · Oral Labial Strength and Mobility: WFL  · Lingual Strength and Mobility: WFL  · Voice Prior to PO Intake: clear    Consistencies Assessed  · Thin x cup/straw x8  · Puree x2  · Solids x1    Oral Preparation/Oral Phase  · WFL- Pt with adequate bolus acceptance, containment, control and timely A-P transfer across consistencies     Pharyngeal Phase   Pt with swallow delay with trigger at the level of the vallecula across all trials  Pt with reduced BOT retraction  Pt with adequate hyolaryngeal elevation and excursion patterns  Pt with complete epiglottic inversion patterns   Thin Liquid  · Premature spillage to  vallecula  · No penetration/aspiration with controlled cup sips  · Flash penetration noted with consecutive cup sips  · Pt with flash penetration during multiple swallow with straw sips   · Minimal pharyngeal residue post swallow, able to clear with second swallow  Puree  · Pt without penetration/aspiration  with puree consistency  · moderate vallecular stasis post swallow, pt able to clear with multiple swallow  · Chin tuck attempted with second trial to decreased stasis; however did not decrease  · Pt had more adequate clearance with head in neutral position and multiple swallows  Solids  · Pt without penetration/aspiration   · Multiple swallows to clear minimal stasis     Cervical Esophageal Phase  · UES appeared to accommodate all bolus types without stasis or retrograde movement observed     Assessment:     Impressions  ·   Patient demonstrates mild pharyngeal dysphagia characterized by flash penetration with thin liquids and min-moderate stasis post swallow with all trials. Pt able to clear stasis with multiple swallows.     Prognosis: Good      Plan  No ST f/u    Education  Results were discussed with patient.    Goals:    SLP Goals        Problem: SLP Goal    Goal Priority Disciplines Outcome   SLP Goal     SLP                    Plan:   · Plan of Care reviewed with:  patient        Discharge recommendations:    No ST f/u    Time Tracking:   SLP Treatment Date:   03/27/18  Speech Start Time:  0937  Speech Stop Time:  0955     Speech Total Time (min):  18 min    Sheila Barger CCC-SLP  03/27/2018

## 2018-03-27 NOTE — PLAN OF CARE
Pulmonary      Please start PPI instead of H2 blocker. Needs urology consult. Needs MBS and antibiotics for HAP. No need for bronch at this time. Please discuss new finding of tricuspid vegetation with the pt.    Charlotte Michaels MD  PCCM Fellow

## 2018-03-27 NOTE — ASSESSMENT & PLAN NOTE
Stable, at baseline level  - Hb 7.5 upon admission (last Hb 9.1 on 3/15); Pt denies hematochezia or melena.  -  2/1/18 EGD and C scope showed non-bleeding erosive gastropathy, internal hemorrhoid and diverticulosis in sigmoid and descending colon  - Has GI appt on 3/29/18 this week for f/u  - F/u Iron studies   - U/A with hematuria. Pending final disposition, will have him follow up with urology as outpatient w/ cytology.   - Will continue to monitor Hb and transfuse Hb < 7

## 2018-03-27 NOTE — SUBJECTIVE & OBJECTIVE
"Interval History: No problems over night. Feels "good", continuing to have cough which is intermittent.     Review of Systems   Constitutional: Positive for fatigue. Negative for appetite change, chills, diaphoresis, fever (subjective) and unexpected weight change.   HENT: Negative for congestion, sore throat and trouble swallowing.    Eyes: Negative for photophobia and visual disturbance.   Respiratory: Positive for cough. Negative for chest tightness and shortness of breath.    Cardiovascular: Positive for leg swelling. Negative for chest pain and palpitations.   Gastrointestinal: Negative for abdominal pain, blood in stool, constipation, diarrhea, nausea and vomiting.   Genitourinary: Negative for decreased urine volume and difficulty urinating.   Musculoskeletal: Negative for arthralgias and back pain.   Skin: Negative for color change and pallor.   Neurological: Negative for dizziness, light-headedness and headaches.   Psychiatric/Behavioral: Negative for agitation, behavioral problems and confusion.     Objective:     Vital Signs (Most Recent):  Temp: 98.1 °F (36.7 °C) (03/27/18 1224)  Pulse: 95 (03/27/18 1224)  Resp: 18 (03/27/18 0755)  BP: 131/65 (03/27/18 1224)  SpO2: (!) 94 % (03/27/18 1224) Vital Signs (24h Range):  Temp:  [98.1 °F (36.7 °C)-99.4 °F (37.4 °C)] 98.1 °F (36.7 °C)  Pulse:  [] 95  Resp:  [18] 18  SpO2:  [94 %-96 %] 94 %  BP: (131-155)/(65-78) 131/65     Weight: 72.6 kg (160 lb 0.9 oz)  Body mass index is 23.64 kg/m².    Intake/Output Summary (Last 24 hours) at 03/27/18 1322  Last data filed at 03/27/18 0241   Gross per 24 hour   Intake              360 ml   Output             2000 ml   Net            -1640 ml      Physical Exam   Constitutional: He is oriented to person, place, and time. He appears well-developed and well-nourished. No distress.   HENT:   Head: Normocephalic and atraumatic.   Eyes: Conjunctivae are normal.   Neck: Normal range of motion. Neck supple.   Cardiovascular: " Normal rate, regular rhythm, normal heart sounds and intact distal pulses.    Pulmonary/Chest: Effort normal. No respiratory distress. He has rales (right middle to lower lung zones).   Abdominal: Soft. He exhibits no distension. There is no tenderness.   Musculoskeletal: He exhibits edema (1+ pitting edema just above ankles b/l).   Neurological: He is alert and oriented to person, place, and time.   Skin: There is pallor.   Psychiatric: He has a normal mood and affect. His behavior is normal.       Significant Labs:   CBC:   Recent Labs  Lab 03/26/18  0537 03/27/18  0438   WBC 8.18 7.34   HGB 7.3* 7.5*   HCT 23.4* 24.5*    244     CMP:   Recent Labs  Lab 03/26/18  0537 03/27/18  0438    139   K 4.5 4.3    106   CO2 25 23   GLU 93 97   BUN 17 19   CREATININE 1.5* 1.4   CALCIUM 9.0 8.7   PROT 6.0 5.8*   ALBUMIN 2.3* 2.2*   BILITOT 0.4 0.4   ALKPHOS 58 59   AST 22 21   ALT 17 16   ANIONGAP 8 10   EGFRNONAA 47.5* 51.6*       Significant Imaging: I have reviewed and interpreted all pertinent imaging results/findings within the past 24 hours.

## 2018-03-27 NOTE — CONSULTS
[facility]  Infectious Disease  Consult Note    Patient Name: Rodolfo Messina  MRN: [id number]  Admission Date: 3/25/2018  Hospital Length of Stay: 2 days  Attending Physician: Lilian Williamson MD  Primary Care Provider: Deric Claudio MD     Isolation Status: No active isolations    Inpatient consult to Infectious Diseases  Consult performed by: GUILLE THOMAS  Consult ordered by: MELE WASHINGTON  Reason for consult: worsening infiltrates on CT with concurrent treatment for endocarditis. lesions not consistent with septic emboli. Pulm wants approval for zosyn.        Consult received, full note to follow. Please call with questions.     Guille Thomas MD  Infectious Disease  [facility]

## 2018-03-27 NOTE — MEDICAL/APP STUDENT
"Progress Note  ***    Admit Date: 3/25/2018      Subjective     CC:  Leg swelling     HPI:   Mr Mayuri is a 66yo M with PMHx of HTN, recent endocarditis with E. faecalis, moderate to severe AR, severe MR and anemia who presents with 5 day duration of b/l feet and ankle swelling.  He was recently admitted to the ICU due to acute respiratory failure secondary to cardiogenic pulmonary edema.  During this admission, he was found to be anemic and underwent EGD and C scope, which did not identify any source of active bleeding.  He received 2 units of PRBCs for symptomatic relief.  He was discharged on 3/6/2018 and given 6 weeks of Amp and ceftriaxone via PICC for endocarditis.       Today, pt complains of b/l feet and ankle swelling of 5 day duration.  He states that he has been doubling his lasix dose since last Wednesday per his primary care physician, which has not improved the swelling.  He also complains of intermittent palpitations and a chronic, dry cough since [place] not relieved with OTC medications.  He describes a "grabbing" feeling in his chest when he inhales which is only relieved with coughing.  He denies any leg pain, chest pain, weakness, SOB, nausea, vomiting, hematochezia, melena, dysuria, fevers or chills.       Of note, a previous CTA chest revealed multiple pulmonary nodules for which Pulmonology was consulted patient has occupational asbestos exposure.     Overnight:   Said he had trouble sleeping due to his cough, tried 3 different cough suppressants and promethazine-codeine syrup finally worked for him.  Around 4AM this morning, he desaturated to 83% on RA and was promptly put on 2L NC, which improved his sats to the 90s.  He denied any inc WOB or SOB during this time.  Leg swelling has decreased, and is wearing compression stockings in bed.  Denies leg pain.  Still experiencing dry cough and says he feels a little congested today, but denies SOB or chest pain.       Review of Systems "   Constitutional: Negative for chills and fever.   HENT: Negative for congestion, sinus pain and sore throat.    Eyes: Negative.    Respiratory: Positive for cough. Negative for shortness of breath and wheezing.    Cardiovascular: Positive for leg swelling (b/l feet and ankles). Negative for chest pain and claudication.   Gastrointestinal: Negative for abdominal pain, nausea and vomiting.   Genitourinary: Negative.    Musculoskeletal: Negative.    Skin: Negative.    Neurological: Positive for tremors (b/l hands). Negative for focal weakness, weakness and headaches.   Endo/Heme/Allergies: Negative.    Psychiatric/Behavioral: Negative.          Objective     Vitals  Temp:  [98.1 °F (36.7 °C)-99.4 °F (37.4 °C)]   Pulse:  []   Resp:  [18]   BP: (131-155)/(65-78)   SpO2:  [94 %-96 %]      I & O (Last 24H):  Intake/Output Summary (Last 24 hours) at 03/27/18 1435  Last data filed at 03/27/18 0241   Gross per 24 hour   Intake              360 ml   Output             2000 ml   Net            -1640 ml       Physical Exam   Constitutional: He is oriented to person, place, and time and well-developed, well-nourished, and in no distress.   HENT:   Head: Normocephalic.   Eyes: Conjunctivae are normal. Pupils are equal, round, and reactive to light.   Neck: Normal range of motion.   Cardiovascular: Normal rate, regular rhythm, normal heart sounds and intact distal pulses.    Pulmonary/Chest: Effort normal. He has no wheezes. He has rales (R mid and lower zone). He exhibits no tenderness.   Abdominal: Soft. Bowel sounds are normal. There is no tenderness.   Musculoskeletal: Normal range of motion. He exhibits edema (b/l feet and ankles 1+).   Neurological: He is alert and oriented to person, place, and time. GCS score is 15.   Skin: Skin is warm.   Psychiatric: Mood and affect normal.   Vitals reviewed.      Diagnostic Results:  CBC  Lab Results   Component Value Date    WBC 7.34 03/27/2018    HGB 7.5 (L) 03/27/2018    HCT  24.5 (L) 03/27/2018    MCV 91 03/27/2018     03/27/2018       BMP  Lab Results   Component Value Date     03/27/2018    K 4.3 03/27/2018     03/27/2018    CO2 23 03/27/2018    BUN 19 03/27/2018    CREATININE 1.4 03/27/2018    CALCIUM 8.7 03/27/2018    ANIONGAP 10 03/27/2018    ESTGFRAFRICA 59.7 (A) 03/27/2018    EGFRNONAA 51.6 (A) 03/27/2018       Micro  Microbiology Results (last 7 days)     Procedure Component Value Units Date/Time    Urine culture [[id number]] Collected:  03/27/18 0606    Order Status:  Sent Specimen:  Urine from Urine, Clean Catch Updated:  03/27/18 0805    Blood culture [[id number]] Collected:  03/27/18 0623    Order Status:  Sent Specimen:  Blood Updated:  03/27/18 0627    Blood culture [[id number]] Collected:  03/27/18 0623    Order Status:  Sent Specimen:  Blood Updated:  03/27/18 0627    Culture, Respiratory with Gram Stain [[id number]]     Order Status:  No result Specimen:  Respiratory     Culture, Respiratory with Gram Stain [[id number]] Collected:  03/26/18 1225    Order Status:  Completed Specimen:  Respiratory from Sputum Updated:  03/26/18 1758     Respiratory Culture Specimen inadequate - culture not performed     Gram Stain (Respiratory) >10epis/lfp and <than many WBC's     Gram Stain (Respiratory) Predominance of oropharyngeal wendie. Please recollect.          Imaging  CT 3/25/2018  -multifocal consolidation and airspace disease in R lower and middle lobes  -assoc small to moderate R pleural effusion  -likely represents pneumonia or aspiration  -septic emboli considered given recent endocarditis, but felt to be less likely given asymmetric distribution  -malignancy less likely given significant interval worsening     CXR 3/25/2018  -patchy consolidation projected over R mid and lower lung zones concerning for infection    2D Echo 3/25/2018  -EF 65%  -Severe MR, moderate AR and TR  -mobile structure on TV, likely vegetation  -pulmonary HTN   -left atrial enlargement      Ba swallow (modified) 3/27/2018  -normal oral transit time  -regular and dense Ba as well as solid food swallowed without difficult  -no aspiration occurred during exam       Assessment     Mr Messina is a 66yo M with PMHx of HTN, recent endocarditis with E. faecalis, moderate to severe AR, severe MR and anemia who presents with 5 day duration of b/l feet and ankle swelling likely due to an adverse effect of amlopidine.  Also complaining of dry cough since December in the setting of worsening CT scans.     DDx:  Leg swelling:  Amlodipine adverse effect  -newly prescribed upon last discharge from admission earlier in March   -holding amlodipine while here has seemed to relieve the swelling     CHF   -b/l lower extremity edema  -PMHx of valvular disease > heart dysfunction     Low oncotic pressure  -liver function not impaired  -no hypoalbuminemia      Cough:  Pneumonia  -patchy consolidation seen on scans, crackles heard in assoc lung zone on exam   -no infective signs, has remained afebrile, WCC and procal normal     Septic emboli   -less likely as cough preceded endocarditis infection, cough started in Dec 2017     Cancer  -37 year history of occupational asbestos and dust exposure, says all of his co workers have lung cancer now  -no history of smoking    Recurrent laryngeal nerve compression   -LA enlargement can compress nerve and cause cough and hoarseness  -not hoarse       Plan     Leg swelling  -hold amlodipine, will do hydralazine for BP control prn  -restart lasix  -Lower extremity US negative for DVT  -2D echo with doppler significant for normal EF, pulm HTN, LA enlargement , moderate AR, severe MR, moderate TR and a mobile structure on TR possibly vegetation      Infective endocarditis  -continue amp and ceftriaxone   -Repeat SOWMYA   -continue ABX regimen, switch from home meds to hospital IV     Chronic cough in setting of abnormal chest CT   -occupational exposure to asbestos and dust for ~37  yrs  -2/22 CT significant for multiple pulm nodules <1cm scattered throughout  -3/25 CT shows patchy consolidation in R lower and mid zones, consider pneumonia or septic emboli. Malignancy less likely due to significant interval worsening  -3/25 CXR shows patchy consolidation in R lower and mid zones concerning for infection  -sputum Cx sent  -pulm consult: effusion is too small for thora, wanted to consider zosyn/vanc, add BCx and discuss PPI trial   -ID consult      Anemia  -consented for transfusion, consider if Hb dips below 7 or he is symptomatic

## 2018-03-27 NOTE — ANESTHESIA PREPROCEDURE EVALUATION
[facility]  Anesthesia Pre-Operative Evaluation         Patient Name: Rodolfo Messina  YOB: 1950  MRN: [id number]    SUBJECTIVE:     Pre-operative evaluation for Procedure(s) (LRB):  TRANSESOPHAGEAL ECHOCARDIOGRAM (SOWMYA) (N/A)     03/27/2018    Rodolfo Messina is a 67 y.o. male w/ a significant PMHx of HTN, anemia, mitral valve vegetation, PVCs, acute hypoxemic respiratory failure, ANNA, anxiety .    Patient now presents for the above procedure(s).      LDA:        PICC Double Lumen 03/05/18 1539 right basilic (Active)   Number of days: 21            Peripheral IV - Single Lumen 03/25/18 1137 Left Forearm (Active)   Site Assessment Clean;Dry;Intact;No redness;No swelling 3/26/2018  8:00 AM   Line Status Saline locked;Flushed 3/26/2018  8:00 AM   Dressing Status Clean;Dry;Intact 3/26/2018  8:00 AM   Dressing Intervention Dressing reinforced 3/26/2018  8:00 AM   Dressing Change Due 03/29/18 3/26/2018  8:00 AM   Site Change Due 03/29/18 3/26/2018  8:00 AM   Reason Not Rotated Not due 3/26/2018  8:00 AM   Number of days: 2       Prev airway: None documented.    Drips: None documented.      Patient Active Problem List   Diagnosis    Tremor    HTN (hypertension)    PVC (premature ventricular contraction)    Anxiety    Generalized abdominal pain    Acute right-sided low back pain without sciatica    Anemia    Colon cancer screening    Symptomatic anemia    ANNA (acute kidney injury)    Chronic cough    Pulmonary nodules    Acute hypoxemic respiratory failure    Subacute infective endocarditis    Mitral and aortic valve regurgitation    Pulmonary hypertension due to mitral valve disease    Subacute bacterial endocarditis    Acute cardiogenic pulmonary edema    Endocarditis    Aortic valve endocarditis    Endocarditis of mitral valve    Urinary retention with incomplete bladder emptying    Hospital-acquired pneumonia    Lower extremity edema    Edema    Erosive  gastropathy    Aspiration pneumonia       Review of patient's allergies indicates:   Allergen Reactions    Augmentin [amoxicillin-pot clavulanate]      Patient felt that it raised BP and requested to have it added as intolerance. Tolerated unasyn    Ciprofloxacin     Flagyl [metronidazole]     Lisinopril Other (See Comments)     cough    Mysoline [primidone]     Omnicef [cefdinir]     Shellfish containing products        Current Inpatient Medications:   ampicillin IVPB  2 g Intravenous Q4H    cefTRIAXone 2 g in dextrose 5 % 50 mL  2 g Intravenous Q12H    cetirizine  10 mg Oral Daily    enoxaparin  40 mg Subcutaneous Daily    furosemide  40 mg Oral Daily    mirtazapine  30 mg Oral QHS    pantoprazole  40 mg Oral Daily    ramelteon  8 mg Oral QHS    tamsulosin  0.8 mg Oral Daily       No current facility-administered medications on file prior to encounter.      Current Outpatient Prescriptions on File Prior to Encounter   Medication Sig Dispense Refill    albuterol-ipratropium 2.5mg-0.5mg/3mL (DUO-NEB) 0.5 mg-3 mg(2.5 mg base)/3 mL nebulizer solution 3 mLs every 4 (four) hours as needed.       AMPICILLIN SODIUM (AMPICILLIN 2 G/100 ML NS, READY TO MIX SYSTEM,) Inject 100 mLs (2 g total) into the vein every 4 (four) hours. 20498 mL 1    benzonatate (TESSALON) 100 MG capsule Take 100 mg by mouth 3 (three) times daily as needed for Cough.      cefTRIAXone 2 g in dextrose 5 % 50 mL (ROCEPHIN) 2 g/50 mL PgBk IVPB Inject 50 mLs (2 g total) into the vein every 12 (twelve) hours. 60 each 1    furosemide (LASIX) 40 MG tablet Take 1 tablet (40 mg total) by mouth once daily. 30 tablet 1    loratadine (CLARITIN) 10 mg tablet Take 10 mg by mouth once daily.      mirtazapine (REMERON) 30 MG tablet Take 1 tablet (30 mg total) by mouth every evening. 30 tablet 3    tamsulosin (FLOMAX) 0.4 mg Cp24 Take 2 capsules (0.8 mg total) by mouth 2 (two) times daily. 120 capsule 11    VENTOLIN HFA 90 mcg/actuation  inhaler          Past Surgical History:   Procedure Laterality Date    APPENDECTOMY      COLONOSCOPY      COLONOSCOPY N/A 2/1/2018    Procedure: COLONOSCOPY;  Surgeon: Richar Payan MD;  Location: [address];  Service: Endoscopy;  Laterality: N/A;  PM prep    EYE SURGERY Right     cataract extraction    FINGER SURGERY      FRACTURE SURGERY Right     index finger    TONSILLECTOMY         Social History     Social History    Marital status:      Spouse name: N/A    Number of children: N/A    Years of education: N/A     Occupational History     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5*   HCT  23.4*  24.5*   PLT  210  244   MCV  93  91   MCH  28.9  28.0   MCHC  31.2*  30.6*       CMP:   Recent Labs      03/26/18   0537  03/27/18   0438   NA  139  139   K  4.5  4.3   CL  106  106   CO2  25  23   BUN  17  19   CREATININE  1.5*  1.4   GLU  93  97   MG  2.1  2.0   CALCIUM  9.0  8.7   ALBUMIN  2.3*  2.2*   PROT  6.0  5.8*   ALKPHOS  58  59   ALT  17  16   AST  22  21   BILITOT  0.4  0.4       INR:  No results for input(s): PT, INR, PROTIME, APTT in the last 72 hours.    Diagnostic Studies: No relevant studies.    EKG: No recent studies available.    2D ECHO:  Results for orders placed or performed during the hospital encounter of 03/25/18   2D echo with color flow doppler   Result Value Ref Range    EF 65 55 - 65    Mitral Valve  Regurgitation SEVERE (A)     Aortic Valve Regurgitation MODERATE (A)     Est. PA Systolic Pressure 64.85 (A)     Mitral Valve Mobility NORMAL     Tricuspid Valve Regurgitation MODERATE (A)          ASSESSMENT/PLAN:         Anesthesia Evaluation    I have reviewed the Patient Summary Reports.    I have reviewed the Nursing Notes.   I have reviewed the Medications.     Review of Systems  Anesthesia Hx:  No problems with previous Anesthesia Denies Hx of Anesthetic complications  History of prior surgery of interest to airway management or planning: Previous anesthesia: MAC Denies Family Hx of Anesthesia complications.   Denies Personal Hx of Anesthesia complications.   Social:  Non-Smoker, No Alcohol Use    Hematology/Oncology:     Oncology Normal    -- Anemia: Denies Current/Recent Cancer   EENT/Dental:  EENT/Dental Normal denies chronic allergic rhinitis    Cardiovascular:   Hypertension Denies MI.  Denies CAD.    Denies CABG/stent.  Denies Dysrhythmias.          HAQ ECG has been reviewed.    Pulmonary:   Denies COPD.  Denies Asthma. Shortness of breath  Denies Recent URI.  Denies Sleep Apnea.    Renal/:   Chronic Renal Disease, ARF    Hepatic/GI:  Hepatic/GI Normal  Denies GERD. Denies Liver Disease.    Musculoskeletal:  Musculoskeletal Normal    Neurological:  Neurology Normal Denies TIA.  Denies CVA. Denies Seizures.    Endocrine:   Denies Diabetes.    Dermatological:  Skin Normal    Psych:   Denies Psychiatric History. anxiety          Physical Exam  General:  Well nourished    Airway/Jaw/Neck:  Airway Findings: Mouth Opening: Normal Tongue: Normal  General Airway Assessment: Adult  Mallampati: III  Improves to II with phonation.  TM Distance: Normal, at least 6 cm         Dental:  Dental Findings: In tact   Chest/Lungs:  Chest/Lungs Clear    Heart/Vascular:  Heart Findings: Normal    Abdomen:  Abdomen Findings:  Normal, Soft, Nontender     Musculoskeletal:  Musculoskeletal Findings: Normal   Skin:  Skin  Findings: Normal    Mental Status:  Mental Status Findings:  Cooperative, Alert and Oriented         Anesthesia Plan  Type of Anesthesia, risks & benefits discussed:  Anesthesia Type:  general, MAC  Patient's Preference:   Intra-op Monitoring Plan: standard ASA monitors  Intra-op Monitoring Plan Comments:   Post Op Pain Control Plan: per primary service following discharge from PACU, IV/PO Opioids PRN and multimodal analgesia  Post Op Pain Control Plan Comments:   Induction:   IV  Beta Blocker:  Patient is not currently on a Beta-Blocker (No further documentation required).       Informed Consent: Patient representative understands risks and agrees with Anesthesia plan.  Questions answered. Anesthesia consent signed with patient representative.  ASA Score: 3     Day of Surgery Review of History & Physical:    H&P update referred to the provider.         Ready For Surgery From Anesthesia Perspective.

## 2018-03-27 NOTE — PROGRESS NOTES
[facility] Hospital Medicine  Progress Note    Patient Name: Rodolfo Messina  MRN: [id number]  Patient Class: IP- Inpatient   Admission Date: 3/25/2018  Length of Stay: 2 days  Attending Physician: Lilian Williamson MD  Primary Care Provider: Deric Claudio MD    [facility] Medicine Team: [facility] 4 Calvin Magaña MD    Subjective:     Principal Problem:<principal problem not specified>    HPI:  Mr. Reynolds is a 67 year old man with HTN and recently diagnosed with endocarditis, mod-severe AR, severe MR on SOWMYA (2/27), and anemia who presents to the ED for 5 days of lower extremity swelling.  Patient noticed lower extremity swelling that started this past Wednesday, for which he doubled his lasix dose (40mg qday to 40mg BID), but did not see any improvement.  He denies any pain to BLE, SOB or CP.  The Norvasc is a new medication since last discharge. He does report having a chronic, dry, non-productive cough since December that prevents him from laying flat because it worsens.  Noted to be relieved by chewing spearmint gum (witnessed during visit). 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  During his most recent admission, a CT chest found multiple nodules (<1cm) scattered throughout the lung. He was evaluated by pulmonology on 3/16 for this cough who recommended repeat CT in 3 months.  Reports fatigue but denies fevers, chills, appetite change, or weight loss.     Patient was recently hospitalized 2/21-3/6 for acute hypoxemic respiratory failure 2/2 cardiogenic edema and found to have endocarditis and enterococcus faecalis bacteremia.  During that admission, he had spent some time in the ICU, as well.  He was discharged with 6 weeks of antibiotics for endocarditis treatment.  In January 2018, he was admitted for symptomatic anemia and had an EGD and C scope (2/1/18) that showed non bleeding erosive gastropathy, internal hemorrhoid and  "diverticulosis in sigmoid and descending colon.           Hospital Course:  Patient admitted to hospital medicine on 3/25/18 for BLE swelling and concern for pneumonia.  In the ED, patient afebrile, no leukocytosis, normal procalcitonin, but CXR showed patchy consolidation in right middle to lower lung field. BLE US showed no signs of DVT. Hb was 7.5 but patient without signs of GIB and VSS stable.  He was given one dose of Vanc in the ED, and then started back on his home antibiotic regimen of Ampicillin and Rocephin.  Echo pending.     Interval History: No problems over night. Feels "good", continuing to have cough which is intermittent.     Review of Systems   Constitutional: Positive for fatigue. Negative for appetite change, chills, diaphoresis, fever (subjective) and unexpected weight change.   HENT: Negative for congestion, sore throat and trouble swallowing.    Eyes: Negative for photophobia and visual disturbance.   Respiratory: Positive for cough. Negative for chest tightness and shortness of breath.    Cardiovascular: Positive for leg swelling. Negative for chest pain and palpitations.   Gastrointestinal: Negative for abdominal pain, blood in stool, constipation, diarrhea, nausea and vomiting.   Genitourinary: Negative for decreased urine volume and difficulty urinating.   Musculoskeletal: Negative for arthralgias and back pain.   Skin: Negative for color change and pallor.   Neurological: Negative for dizziness, light-headedness and headaches.   Psychiatric/Behavioral: Negative for agitation, behavioral problems and confusion.     Objective:     Vital Signs (Most Recent):  Temp: 98.1 °F (36.7 °C) (03/27/18 1224)  Pulse: 95 (03/27/18 1224)  Resp: 18 (03/27/18 0755)  BP: 131/65 (03/27/18 1224)  SpO2: (!) 94 % (03/27/18 1224) Vital Signs (24h Range):  Temp:  [98.1 °F (36.7 °C)-99.4 °F (37.4 °C)] 98.1 °F (36.7 °C)  Pulse:  [] 95  Resp:  [18] 18  SpO2:  [94 %-96 %] 94 %  BP: (131-155)/(65-78) 131/65 "     Weight: 72.6 kg (160 lb 0.9 oz)  Body mass index is 23.64 kg/m².    Intake/Output Summary (Last 24 hours) at 03/27/18 1322  Last data filed at 03/27/18 0241   Gross per 24 hour   Intake              360 ml   Output             2000 ml   Net            -1640 ml      Physical Exam   Constitutional: He is oriented to person, place, and time. He appears well-developed and well-nourished. No distress.   HENT:   Head: Normocephalic and atraumatic.   Eyes: Conjunctivae are normal.   Neck: Normal range of motion. Neck supple.   Cardiovascular: Normal rate, regular rhythm, normal heart sounds and intact distal pulses.    Pulmonary/Chest: Effort normal. No respiratory distress. He has rales (right middle to lower lung zones).   Abdominal: Soft. He exhibits no distension. There is no tenderness.   Musculoskeletal: He exhibits edema (1+ pitting edema just above ankles b/l).   Neurological: He is alert and oriented to person, place, and time.   Skin: There is pallor.   Psychiatric: He has a normal mood and affect. His behavior is normal.       Significant Labs:   CBC:   Recent Labs  Lab 03/26/18  0537 03/27/18  0438   WBC 8.18 7.34   HGB 7.3* 7.5*   HCT 23.4* 24.5*    244     CMP:   Recent Labs  Lab 03/26/18  0537 03/27/18  0438    139   K 4.5 4.3    106   CO2 25 23   GLU 93 97   BUN 17 19   CREATININE 1.5* 1.4   CALCIUM 9.0 8.7   PROT 6.0 5.8*   ALBUMIN 2.3* 2.2*   BILITOT 0.4 0.4   ALKPHOS 58 59   AST 22 21   ALT 17 16   ANIONGAP 8 10   EGFRNONAA 47.5* 51.6*       Significant Imaging: I have reviewed and interpreted all pertinent imaging results/findings within the past 24 hours.    Assessment/Plan:      Aspiration pneumonia    See cough          Erosive gastropathy    Continue PPI.           Edema    Norvasc held for now  MOHAN hose.         Lower extremity edema    Worsening BLE for past 5 days despite increasing home Lasix 40mg qday to 40mg BID.  - Could be s/e of Norvasc or worsening HF from  endocarditis, although pt not having SOB and CXR not consistent with pulmonary edema  - Hold norvasc  - BLE US negative for DVT              Hospital-acquired pneumonia    See cough.           Endocarditis    See cough.     - Diagnosed by SOWMYA on 2/27 presumably from enterococcus faecalis bacteremia.   - Has ID f/u on 3/27/18  - Last negative BCx 3/1 so receiving 6 weeks of Rocephin and Ampicillin (end date 4/12)  - Continue Rocephin 2g IV BID and Ampicillin 2g IV q4h          Chronic cough    Chronic, dry, non-productive cough since December and 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  - Previously on lisinopril, which was discontinued, but no improvement in cough   - 2/22 CT chest significant for multiple pulmonary nodules <1cm scattered throughout, no intervention, followed by pulmonary on 3/2016        Stable, unchanged, chronic  - DDx: Infection, pneumonia, septic emboli, malignancy  - Unclear cause of chronic cough at this time. CT Chest concerning with RLL and RML consolidations and air space disease, but no indication of systemic inflammation (no fever, no WBC, neg procal)  - Echo concerning for evolving vegetation and septic emboli. Previously (2/24/2018) it was noted that vegetations were likely present on mitral valve with mild to moderate valvular dysfunction, a work up that was triggered by consecutive blood cultures growing enterococcus faecalis. However, on SOWMYA performed 2/27/18 suggestive of mitral vegetation, w/ severe valvular dysfunction, in addition to vegetation on aortic valve. Most recent TTE now showing mitral valve vegetation and new tricuspid vegetation with tricuspid regurgitation.   - Continue broad spectrum therapy, repeat blood cultures obtained.     Consults  - Pulmonary consulted, appreciate recs.   - ID consulted. Will follow up recommendations.            Anemia    Stable, at baseline level  - Hb 7.5 upon admission (last Hb 9.1 on 3/15); Pt denies hematochezia or  melena.  -  2/1/18 EGD and C scope showed non-bleeding erosive gastropathy, internal hemorrhoid and diverticulosis in sigmoid and descending colon  - Has GI appt on 3/29/18 this week for f/u  - F/u Iron studies   - U/A with hematuria. Pending final disposition, will have him follow up with urology as outpatient w/ cytology.   - Will continue to monitor Hb and transfuse Hb < 7            HTN (hypertension)    Takes Lasix 40mg qday and Norvasc 10mg qday at home.  - Continue Lasix and hold norvasc 2/2 LE edema  - Consider discharging patient on thiazide  - Hydralazine 25mg prn            VTE Risk Mitigation         Ordered     enoxaparin injection 40 mg  Daily     Route:  Subcutaneous        03/26/18 0635     Place MOHAN hose  Until discontinued      03/26/18 0950     IP VTE LOW RISK PATIENT  Once      03/25/18 1354              Calvin Magaña MD  Department of Hospital Medicine   [facility]

## 2018-03-27 NOTE — PT/OT/SLP EVAL
Occupational Therapy   Evaluation and Discharge Note    Name: Rodolfo Messina  MRN: [id number]  Admitting Diagnosis:  <principal problem not specified>      Recommendations:     Discharge Recommendations: home  Discharge Equipment Recommendations:  none  Barriers to discharge:  None    History:     Occupational Profile:  Living Environment: Pt lives with wife on 1 story home.   Previous level of function: Pt I with self care prior to admisssion  Equipment Owned:  none  Assistance upon Discharge: pt wife available to assist with self care    Past Medical History:   Diagnosis Date    ANNA (acute kidney injury) 2/22/2018    Anemia     Anxiety     BPH (benign prostatic hyperplasia)     Diverticulosis     Hypertension     Urinary retention        Past Surgical History:   Procedure Laterality Date    APPENDECTOMY      COLONOSCOPY      COLONOSCOPY N/A 2/1/2018    Procedure: COLONOSCOPY;  Surgeon: Richar Payan MD;  Location: [address]);  Service: Endoscopy;  Laterality: N/A;  PM prep    EYE SURGERY Right     cataract extraction    FINGER SURGERY      FRACTURE SURGERY Right     index finger    TONSILLECTOMY         Subjective     Chief Complaint: no complaints  Patient/Family stated goals: return to home  Communicated with: RN prior to session.  Pain/Comfort:  · Pain Rating 1: 0/10  · Pain Rating Post-Intervention 1: 0/10    Patients cultural, spiritual, Pentec[employer]al conflicts given the current situation: none stated     Objective:     Patient found with: telemetry, peripheral IV    General Precautions: Standard, fall   Orthopedic Precautions:N/A   Braces: N/A     Occupational Performance:    Bed Mobility:    · Patient completed Rolling/Turning to Right with independence  · Patient completed Scooting/Bridging with independence  · Patient completed Supine to Sit with independence  · Patient completed Sit to Supine with independence    Functional Mobility/Transfers:  · Patient completed Sit <> Stand  "Transfer with stand by assistance  with  no assistive device   · Patient completed Toilet Transfer Step Transfer technique with stand by assistance with  no AD  · Functional Mobility: Pt able to demonstrate safe and effective functional mobility     Activities of Daily Living:  · Grooming: independence    · UB Dressing: independence    · LB Dressing: independence    · Toileting: independence      Cognitive/Visual Perceptual:  Cognitive/Psychosocial Skills:     -       Oriented to: Person, Place, Time and Situation   -       Follows Commands/attention:Follows multistep  commands  -       Communication: clear/fluent  -       Memory: No Deficits noted  -       Safety awareness/insight to disability: intact   -       Mood/Affect/Coping skills/emotional control: Appropriate to situation    Physical Exam:  Postural examination/scapula alignment:    -       Rounded shoulders  -       Forward head  Skin integrity: Visible skin intact  Upper Extremity Range of Motion:     -       Right Upper Extremity: WFL  -       Left Upper Extremity: WFL  Upper Extremity Strength:    -       Right Upper Extremity: WFL  -       Left Upper Extremity: WFL    Patient left supine with all lines intact    AMPAC 6 Click:  AMPAC Total Score: 24    Treatment & Education:  Evaluation complete.  Pt educated on safety, role of OT, importance of increased participation in self care for gains , expectations for participation, expectations for gains, POC, energy conservation, caregiver strain.     Education:    Assessment:     Rodolfo Messina is a 67 y.o. male with a medical diagnosis of <principal problem not specified>. At this time, patient is functioning at their prior level of function and does not require further acute OT services.     Clinical Decision Makin.  OT Low:  "Pt evaluation falls under low complexity for evaluation coding due to performance deficits noted in 1-3 areas as stated above and 0 co-morbities affecting current " "functional status. Data obtained from problem focused assessments. No modifications or assistance was required for completion of evaluation. Only brief occupational profile and history review completed."     Plan:     During this hospitalization, patient does not require further acute OT services.  Please re-consult if situation changes.    · Plan of Care Reviewed with: patient    This Plan of care has been discussed with the patient who was involved in its development and understands and is in agreement with the identified goals and treatment plan    GOALS:    Occupational Therapy Goals     Not on file          Multidisciplinary Problems (Resolved)        Problem: Occupational Therapy Goal    Goal Priority Disciplines Outcome Interventions   Occupational Therapy Goal   (Resolved)     OT, PT/OT Outcome(s) achieved                    Time Tracking:     OT Date of Treatment: 03/27/18  OT Start Time: 1040  OT Stop Time: 1100  OT Total Time (min): 20 min    Billable Minutes:Evaluation 20    Kera Dobson OT  3/27/2018    "

## 2018-03-27 NOTE — HPI
Pt is a 67 year old man with HTN, urinary retention (indwelling lackey), chronic back pain, essential tremor, anxiety and depression, recently admitted from 2/21/2018 to 03/06/2018. Pt was initially admitted for anemia, also had interval development of bilateral patchy interstitial and parietal attenuation reflective of edema vs infection. During his heart failure workup a SOWMYA revealed vegetations on the aortic and mitral valve with severe regurgitation of both. Blood cultures were positive to Enterococcus faecalis, sensitive to ampicillin. Pt was started on ampicillin and ceftriaxone for a six week course of therapy. Pt seen in ID clinic on 3/15/2018 where it was determined that he bilateral impacted cerumen (pt complaining of decreased hearing) and was continued on ampicillin and ceftriaxone. Around the time of 3/20/2018 pt began to complain of increased swelling in both legs and called ID office, thinking this may have a side effect of the antibiotic. Pt also spoke with PCP who increased his lasix dose to TID without relief. Pt has also recently been started on amlodipine, but states this has been removed a few days ago. Pt denies fever or chills, denies change in the nature of his cough ,states that it is a dry, non-productive cough. Pt states that he has been adherent to his home antibiotics, with his wife helping him. Denies recent travel, denies sick contacts.     Previous Positive Cultures  02/14/2018 UCx  Proteus mirabilis, Pan-S  02/25/2018 BCx, NSI E.faecalis, Amp-Vanc-S  02/27/2018 BCx, LFA E.faecalis  02/27/2018 BCx, RH  E.faecalis    Cultures this admission  03/25/2018 Resp cx Inadequate sample

## 2018-03-27 NOTE — PLAN OF CARE
Problem: Occupational Therapy Goal  Goal: Occupational Therapy Goal  Outcome: Outcome(s) achieved Date Met: 03/27/18  Evaluation completed.  Pt without skilled OT need.  DC skilled OT  Kera Dobson, OT  3/27/2018

## 2018-03-27 NOTE — ASSESSMENT & PLAN NOTE
Worsening BLE for past 5 days despite increasing home Lasix 40mg qday to 40mg BID.  - Could be s/e of Norvasc or worsening HF from endocarditis, although pt not having SOB and CXR not consistent with pulmonary edema  - Hold norvasc  - BLE US negative for DVT

## 2018-03-27 NOTE — SUBJECTIVE & OBJECTIVE
Past Medical History:   Diagnosis Date    ANNA (acute kidney injury) 2/22/2018    Anemia     Anxiety     BPH (benign prostatic hyperplasia)     Diverticulosis     Hypertension     Urinary retention        Past Surgical History:   Procedure Laterality Date    APPENDECTOMY      COLONOSCOPY      COLONOSCOPY N/A 2/1/2018    Procedure: COLONOSCOPY;  Surgeon: Richar Payan MD;  Location: [address]);  Service: Endoscopy;  Laterality: N/A;  PM prep    EYE SURGERY Right     cataract extraction    FINGER SURGERY      FRACTURE SURGERY Right     index finger    TONSILLECTOMY         Review of patient's allergies indicates:   Allergen Reactions    Augmentin [amoxicillin-pot clavulanate]      Patient felt that it raised BP and requested to have it added as intolerance. Tolerated unasyn    Ciprofloxacin     Flagyl [metronidazole]     Lisinopril Other (See Comments)     cough    Mysoline [primidone]     Omnicef [cefdinir]     Shellfish containing products        Medications:  Prescriptions Prior to Admission   Medication Sig    albuterol-ipratropium 2.5mg-0.5mg/3mL (DUO-NEB) 0.5 mg-3 mg(2.5 mg base)/3 mL nebulizer solution 3 mLs every 4 (four) hours as needed.     AMPICILLIN SODIUM (AMPICILLIN 2 G/100 ML NS, READY TO MIX SYSTEM,) Inject 100 mLs (2 g total) into the vein every 4 (four) hours.    benzonatate (TESSALON) 100 MG capsule Take 100 mg by mouth 3 (three) times daily as needed for Cough.    cefTRIAXone 2 g in dextrose 5 % 50 mL (ROCEPHIN) 2 g/50 mL PgBk IVPB Inject 50 mLs (2 g total) into the vein every 12 (twelve) hours.    furosemide (LASIX) 40 MG tablet Take 1 tablet (40 mg total) by mouth once daily.    loratadine (CLARITIN) 10 mg tablet Take 10 mg by mouth once daily.    mirtazapine (REMERON) 30 MG tablet Take 1 tablet (30 mg total) by mouth every evening.    tamsulosin (FLOMAX) 0.4 mg Cp24 Take 2 capsules (0.8 mg total) by mouth 2 (two) times daily.    VENTOLIN HFA 90  mcg/actuation inhaler     [DISCONTINUED] amLODIPine (NORVASC) 10 MG tablet Take 1 tablet (10 mg total) by mouth once daily.    [DISCONTINUED] CHERATUSSIN AC  mg/5 mL syrup Take 10 mLs by mouth every 6 (six) hours as needed.    [DISCONTINUED] guaifenesin-codeine 100-10 mg/5 ml (CHERATUSSIN AC)  mg/5 mL syrup Take 5 mLs by mouth 3 (three) times daily as needed.     Antibiotics     Start     Stop Route Frequency Ordered    03/27/18 1241  ampicillin 2 g in sodium chloride 0.9 % 100 mL IVPB (ready to mix system)      -- IV Every 4 hours (non-standard times) 03/27/18 0940    03/25/18 1700  cefTRIAXone (ROCEPHIN) 2 g in dextrose 5 % 50 mL IVPB      -- IV Every 12 hours (non-standard times) 03/25/18 1553        Antifungals     None        Antivirals     None           Immunization History   Administered Date(s) Administered    Td - PF (ADULT) 08/24/2016       Family History     Problem Relation (Age of Onset)    Heart disease Mother, Father    No Known Problems Sister, Brother    Stroke Mother        Social History     Social History    Marital status:      Spouse name: N/A    Number of children: N/A    Years of education: N/A     Occupational History     [employer]     Social History Main Topics    Smoking status: Never Smoker    Smokeless tobacco: Never Used    Alcohol use Yes      Comment: none recently    Drug use: No    Sexual activity: Not Currently     Partners: Female     Other Topics Concern    None     Social History Narrative    None     Review of Systems   Constitutional: Negative for appetite change, chills, fatigue and fever.   HENT: Positive for congestion. Negative for sore throat and trouble swallowing.    Eyes: Negative for redness and visual disturbance.   Respiratory: Positive for cough. Negative for choking, shortness of breath and wheezing.    Cardiovascular: Positive for leg swelling. Negative for chest pain.   Gastrointestinal: Negative for abdominal distention,  constipation, diarrhea, nausea and vomiting.   Endocrine: Negative for polyuria.   Genitourinary: Negative for dysuria and hematuria.   Musculoskeletal: Negative for arthralgias and myalgias.   Skin: Negative for color change, pallor and rash.   Neurological: Negative for dizziness and weakness.   Psychiatric/Behavioral: Positive for agitation. Negative for behavioral problems and confusion.     Objective:     Vital Signs (Most Recent):  Temp: 98.1 °F (36.7 °C) (03/27/18 1224)  Pulse: 95 (03/27/18 1224)  Resp: 18 (03/27/18 0755)  BP: 131/65 (03/27/18 1224)  SpO2: (!) 94 % (03/27/18 1224) Vital Signs (24h Range):  Temp:  [98.1 °F (36.7 °C)-99.4 °F (37.4 °C)] 98.1 °F (36.7 °C)  Pulse:  [] 95  Resp:  [18] 18  SpO2:  [94 %-96 %] 94 %  BP: (131-155)/(65-78) 131/65     Weight: 72.6 kg (160 lb 0.9 oz)  Body mass index is 23.64 kg/m².    Estimated Creatinine Clearance: 51.2 mL/min (based on SCr of 1.4 mg/dL).    Physical Exam   Constitutional: He is oriented to person, place, and time. He appears well-developed and well-nourished.   HENT:   Head: Normocephalic.   Mouth/Throat: Oropharynx is clear and moist. No oropharyngeal exudate.   Eyes: Conjunctivae are normal. Pupils are equal, round, and reactive to light. Left eye exhibits no discharge. No scleral icterus.   Neck: No JVD present.   Cardiovascular: Normal rate and regular rhythm.  Exam reveals no gallop.    Murmur heard.  Pulmonary/Chest: Effort normal and breath sounds normal.   Abdominal: Soft. Bowel sounds are normal.   Musculoskeletal: He exhibits no edema, tenderness or deformity.   Lymphadenopathy:     He has no cervical adenopathy.   Neurological: He is alert and oriented to person, place, and time.   Skin: Skin is warm and dry. Capillary refill takes less than 2 seconds.       Significant Labs:   CBC:   Recent Labs  Lab 03/26/18  0537 03/27/18  0438   WBC 8.18 7.34   HGB 7.3* 7.5*   HCT 23.4* 24.5*    244     CMP:   Recent Labs  Lab 03/26/18  0537  03/27/18  0438    139   K 4.5 4.3    106   CO2 25 23   GLU 93 97   BUN 17 19   CREATININE 1.5* 1.4   CALCIUM 9.0 8.7   PROT 6.0 5.8*   ALBUMIN 2.3* 2.2*   BILITOT 0.4 0.4   ALKPHOS 58 59   AST 22 21   ALT 17 16   ANIONGAP 8 10   EGFRNONAA 47.5* 51.6*     Microbiology Results (last 7 days)     Procedure Component Value Units Date/Time    Blood culture [[id number]] Collected:  03/27/18 0623    Order Status:  Completed Specimen:  Blood Updated:  03/27/18 1515     Blood Culture, Routine No Growth to date    Blood culture [[id number]] Collected:  03/27/18 0623    Order Status:  Completed Specimen:  Blood Updated:  03/27/18 1515     Blood Culture, Routine No Growth to date    Urine culture [[id number]] Collected:  03/27/18 0606    Order Status:  Sent Specimen:  Urine from Urine, Clean Catch Updated:  03/27/18 0805    Culture, Respiratory with Gram Stain [[id number]]     Order Status:  No result Specimen:  Respiratory     Culture, Respiratory with Gram Stain [[id number]] Collected:  03/26/18 1225    Order Status:  Completed Specimen:  Respiratory from Sputum Updated:  03/26/18 1758     Respiratory Culture Specimen inadequate - culture not performed     Gram Stain (Respiratory) >10epis/lfp and <than many WBC's     Gram Stain (Respiratory) Predominance of oropharyngeal wendie. Please recollect.          Significant Imaging: I have reviewed all pertinent imaging results/findings within the past 24 hours.

## 2018-03-28 VITALS
SYSTOLIC BLOOD PRESSURE: 141 MMHG | BODY MASS INDEX: 23.71 KG/M2 | WEIGHT: 160.06 LBS | HEIGHT: 69 IN | RESPIRATION RATE: 18 BRPM | DIASTOLIC BLOOD PRESSURE: 72 MMHG | HEART RATE: 88 BPM | OXYGEN SATURATION: 93 % | TEMPERATURE: 99 F

## 2018-03-28 DIAGNOSIS — I38 ENDOCARDITIS, UNSPECIFIED CHRONICITY, UNSPECIFIED ENDOCARDITIS TYPE: Primary | ICD-10-CM

## 2018-03-28 PROBLEM — J18.9 HOSPITAL-ACQUIRED PNEUMONIA: Status: RESOLVED | Noted: 2018-03-25 | Resolved: 2018-03-28
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
## 2018-03-28 LAB
ALBUMIN SERPL BCP-MCNC: 2.3 G/DL
ALP SERPL-CCNC: 57 U/L
ALT SERPL W/O P-5'-P-CCNC: 14 U/L
ANION GAP SERPL CALC-SCNC: 10 MMOL/L
AST SERPL-CCNC: 18 U/L
BACTERIA UR CULT: NO GROWTH
BASOPHILS # BLD AUTO: 0.04 K/UL
BASOPHILS NFR BLD: 0.5 %
BILIRUB SERPL-MCNC: 0.4 MG/DL
BUN SERPL-MCNC: 21 MG/DL
CALCIUM SERPL-MCNC: 9.1 MG/DL
CHLORIDE SERPL-SCNC: 108 MMOL/L
CO2 SERPL-SCNC: 24 MMOL/L
CREAT SERPL-MCNC: 1.5 MG/DL
DIFFERENTIAL METHOD: ABNORMAL
EOSINOPHIL # BLD AUTO: 0.6 K/UL
EOSINOPHIL NFR BLD: 7.7 %
ERYTHROCYTE [DISTWIDTH] IN BLOOD BY AUTOMATED COUNT: 17.8 %
EST. GFR  (AFRICAN AMERICAN): 54.9 ML/MIN/1.73 M^2
EST. GFR  (NON AFRICAN AMERICAN): 47.5 ML/MIN/1.73 M^2
GLUCOSE SERPL-MCNC: 90 MG/DL
HCT VFR BLD AUTO: 24 %
HGB BLD-MCNC: 7.5 G/DL
IMM GRANULOCYTES # BLD AUTO: 0.03 K/UL
IMM GRANULOCYTES NFR BLD AUTO: 0.4 %
LYMPHOCYTES # BLD AUTO: 0.7 K/UL
LYMPHOCYTES NFR BLD: 8.7 %
MAGNESIUM SERPL-MCNC: 2.1 MG/DL
MCH RBC QN AUTO: 28.8 PG
MCHC RBC AUTO-ENTMCNC: 31.3 G/DL
MCV RBC AUTO: 92 FL
MONOCYTES # BLD AUTO: 0.7 K/UL
MONOCYTES NFR BLD: 8.4 %
NEUTROPHILS # BLD AUTO: 5.9 K/UL
NEUTROPHILS NFR BLD: 74.3 %
NRBC BLD-RTO: 0 /100 WBC
PLATELET # BLD AUTO: 242 K/UL
PMV BLD AUTO: 10.8 FL
POTASSIUM SERPL-SCNC: 4.1 MMOL/L
PROT SERPL-MCNC: 5.9 G/DL
RBC # BLD AUTO: 2.6 M/UL
SODIUM SERPL-SCNC: 142 MMOL/L
WBC # BLD AUTO: 7.96 K/UL

## 2018-03-28 PROCEDURE — 80053 COMPREHEN METABOLIC PANEL: CPT

## 2018-03-28 PROCEDURE — G0378 HOSPITAL OBSERVATION PER HR: HCPCS

## 2018-03-28 PROCEDURE — 63600175 PHARM REV CODE 636 W HCPCS: Performed by: STUDENT IN AN ORGANIZED HEALTH CARE EDUCATION/TRAINING PROGRAM

## 2018-03-28 PROCEDURE — 63600175 PHARM REV CODE 636 W HCPCS: Performed by: HOSPITALIST

## 2018-03-28 PROCEDURE — 25000003 PHARM REV CODE 250: Performed by: STUDENT IN AN ORGANIZED HEALTH CARE EDUCATION/TRAINING PROGRAM

## 2018-03-28 PROCEDURE — 25000003 PHARM REV CODE 250: Performed by: HOSPITALIST

## 2018-03-28 PROCEDURE — 36415 COLL VENOUS BLD VENIPUNCTURE: CPT

## 2018-03-28 PROCEDURE — 85025 COMPLETE CBC W/AUTO DIFF WBC: CPT

## 2018-03-28 PROCEDURE — 99239 HOSP IP/OBS DSCHRG MGMT >30: CPT | Mod: ,,, | Performed by: HOSPITALIST

## 2018-03-28 PROCEDURE — 83735 ASSAY OF MAGNESIUM: CPT

## 2018-03-28 RX ORDER — PANTOPRAZOLE SODIUM 40 MG/1
40 TABLET, DELAYED RELEASE ORAL DAILY
Qty: 30 TABLET | Refills: 5 | Status: SHIPPED | OUTPATIENT
Start: 2018-03-28 | End: 2018-09-06 | Stop reason: SDUPTHER

## 2018-03-28 RX ADMIN — CETIRIZINE HYDROCHLORIDE 10 MG: 10 TABLET, FILM COATED ORAL at 11:03

## 2018-03-28 RX ADMIN — AMPICILLIN SODIUM 2 G: 2 INJECTION, POWDER, FOR SOLUTION INTRAMUSCULAR; INTRAVENOUS at 11:03

## 2018-03-28 RX ADMIN — FUROSEMIDE 40 MG: 40 TABLET ORAL at 11:03

## 2018-03-28 RX ADMIN — AMPICILLIN SODIUM 2 G: 2 INJECTION, POWDER, FOR SOLUTION INTRAMUSCULAR; INTRAVENOUS at 12:03

## 2018-03-28 RX ADMIN — AMPICILLIN SODIUM 2 G: 2 INJECTION, POWDER, FOR SOLUTION INTRAMUSCULAR; INTRAVENOUS at 08:03

## 2018-03-28 RX ADMIN — AMPICILLIN SODIUM 2 G: 2 INJECTION, POWDER, FOR SOLUTION INTRAMUSCULAR; INTRAVENOUS at 04:03

## 2018-03-28 RX ADMIN — PROMETHAZINE HYDROCHLORIDE AND CODEINE PHOSPHATE 7.5 ML: 10; 6.25 SOLUTION ORAL at 03:03

## 2018-03-28 RX ADMIN — CEFTRIAXONE SODIUM 2 G: 2 INJECTION, POWDER, FOR SOLUTION INTRAMUSCULAR; INTRAVENOUS at 05:03

## 2018-03-28 RX ADMIN — TAMSULOSIN HYDROCHLORIDE 0.8 MG: 0.4 CAPSULE ORAL at 11:03

## 2018-03-28 RX ADMIN — PANTOPRAZOLE SODIUM 40 MG: 40 TABLET, DELAYED RELEASE ORAL at 11:03

## 2018-03-28 NOTE — ASSESSMENT & PLAN NOTE
Stable, unchanged, chronic  - DDx: Infection, pneumonia, septic emboli, malignancy  - Unclear cause of chronic cough at this time. CT Chest concerning with RLL and RML consolidations and air space disease, but no indication of systemic inflammation (no fever, no WBC, neg procal)  - likely secondary to reflux given improvement with PPI  - Echo concerning for evolving vegetation and septic emboli. Previously (2/24/2018) it was noted that vegetations were likely present on mitral valve with mild to moderate valvular dysfunction, a work up that was triggered by consecutive blood cultures growing enterococcus faecalis. However, on SOWMYA performed 2/27/18 suggestive of mitral vegetation, w/ severe valvular dysfunction, in addition to vegetation on aortic valve. Most recent TTE now showing mitral valve vegetation and new tricuspid vegetation with tricuspid regurgitation.  - Reviewed echos with cards who report echoes are unchanged   - Continue broad spectrum therapy, repeat blood cultures obtained and are negative

## 2018-03-28 NOTE — NURSING
Patient ambulated around the unit with wife by his side, had less episodes of coughing this evening

## 2018-03-28 NOTE — PLAN OF CARE
[facility]    HOME HEALTH ORDERS  FACE TO FACE ENCOUNTER    Patient Name: Rodolfo Messina  YOB: 1950    PCP: Deric Claudio MD   PCP Address: [address] [facility] / [place] [address]  PCP Phone Number: [phone]  PCP Fax: [phone]    Encounter Date: 03/28/2018    Admit to Home Health    Diagnoses:  Active Hospital Problems    Diagnosis  POA    Edema [R60.9]  Unknown    Erosive gastropathy [K31.89]  Unknown    Aspiration pneumonia [J69.0]  Unknown    Hospital-acquired pneumonia [J18.9]  Yes    Lower extremity edema [R60.0]  Unknown    Endocarditis [I38]  Yes    Subacute bacterial endocarditis [I33.0]  Yes    Chronic cough [R05]  Yes     Chronic, dry, non-productive cough since December and 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  - Previously on lisinopril, which was discontinued, but no improvement in cough   - 2/22 CT chest significant for multiple pulmonary nodules <1cm scattered throughout, no intervention, followed by pulmonary on 3/2016        Anemia [D64.9]  Yes    HTN (hypertension) [I10]  Yes     Chronic      Resolved Hospital Problems    Diagnosis Date Resolved POA   No resolved problems to display.       Future Appointments  Date Time Provider Department Center   3/29/2018 2:00 PM Lisandro Bettencourt MD [facility] GASTRO [facility]   4/25/2018 3:00 PM Mayo Sandhu MD [facility] VOI THOM[facility]   5/3/2018 3:20 PM Pearl Borrego MD [facility] MIKE [facility]   6/14/2018 9:00 AM LAB, HEMONC SAME DAY [facility] HO [facility]   6/14/2018 10:00 AM Bob Borrego MD [facility] BM RIOS [facility]           I have seen and examined this patient face to face today. My clinical findings that support the need for the home health skilled services and home bound status are the following:  Medical restrictions requiring assistance of another human to leave home due to  IV infusion Needs.    Allergies:  Review of patient's allergies indicates:   Allergen Reactions     Augmentin [amoxicillin-pot clavulanate]      Patient felt that it raised BP and requested to have it added as intolerance. Tolerated unasyn    Ciprofloxacin     Flagyl [metronidazole]     Lisinopril Other (See Comments)     cough    Mysoline [primidone]     Omnicef [cefdinir]     Shellfish containing products        Diet: regular diet    Activities: activity as tolerated    Nursing:   SN to complete comprehensive assessment including routine vital signs. Instruct on disease process and s/s of complications to report to MD. Review/verify medication list sent home with the patient at time of discharge  and instruct patient/caregiver as needed. Frequency may be adjusted depending on start of care date.    Notify MD if SBP > 160 or < 90; DBP > 90 or < 50; HR > 120 or < 50; Temp > 101      MISCELLANEOUS CARE:  Home Infusion Therapy:   SN to perform Infusion Therapy/Central Line Care.  Review Central Line Care & Central Line Flush with patient.    Administer (drug and dose): Ampicllin 2 grams every 4 hours until April 12 and Rocephin 2 g q 12 hours until April 12  Last dose given: 1000 ampicillin, 0400 rocephin                        Home dose due: 1400 ampicillin, 1700 rocephin    Scrub the Hub: Prior to accessing the line, always perform a 30 second alcohol scrub  Each lumen of the central line is to be flushed at least daily with 10 mL Normal Saline and 3 mL Heparin flush (100 units/mL)  Skilled Nurse (SN) may draw blood from IV access  Blood Draw Procedure:   - Aspirate at least 5 mL of blood   - Discard   - Obtain specimen   - Change posiflow cap   - Flush with 20 mL Normal Saline followed by a                 3-5 mL Heparin flush (100 units/mL)  Central :   - Sterile dressing changes are done weekly and as needed.   - Use chlor-hexadine scrub to cleanse site, apply Biopatch to insertion site,       apply securement device dressing   - Posi-flow caps are changed weekly and after EVERY lab  draw.   - If sterile gauze is under dressing to control oozing,                 dressing change must be performed every 24 hours until gauze is not needed.      Medications: Review discharge medications with patient and family and provide education.      Current Discharge Medication List      START taking these medications    Details   pantoprazole (PROTONIX) 40 MG tablet Take 1 tablet (40 mg total) by mouth once daily.  Qty: 30 tablet, Refills: 5      promethazine-codeine 6.25-10 mg/5 ml (PHENERGAN WITH CODEINE) 6.25-10 mg/5 mL syrup Take 7.5 mLs by mouth every 4 (four) hours as needed for Cough.  Qty: 1 Bottle, Refills: 0         CONTINUE these medications which have NOT CHANGED    Details   albuterol-ipratropium 2.5mg-0.5mg/3mL (DUO-NEB) 0.5 mg-3 mg(2.5 mg base)/3 mL nebulizer solution 3 mLs every 4 (four) hours as needed.       AMPICILLIN SODIUM (AMPICILLIN 2 G/100 ML NS, READY TO MIX SYSTEM,) Inject 100 mLs (2 g total) into the vein every 4 (four) hours.  Qty: 89053 mL, Refills: 1      benzonatate (TESSALON) 100 MG capsule Take 100 mg by mouth 3 (three) times daily as needed for Cough.      cefTRIAXone 2 g in dextrose 5 % 50 mL (ROCEPHIN) 2 g/50 mL PgBk IVPB Inject 50 mLs (2 g total) into the vein every 12 (twelve) hours.  Qty: 60 each, Refills: 1      furosemide (LASIX) 40 MG tablet Take 1 tablet (40 mg total) by mouth once daily.  Qty: 30 tablet, Refills: 1      loratadine (CLARITIN) 10 mg tablet Take 10 mg by mouth once daily.      mirtazapine (REMERON) 30 MG tablet Take 1 tablet (30 mg total) by mouth every evening.  Qty: 30 tablet, Refills: 3      tamsulosin (FLOMAX) 0.4 mg Cp24 Take 2 capsules (0.8 mg total) by mouth 2 (two) times daily.  Qty: 120 capsule, Refills: 11      VENTOLIN HFA 90 mcg/actuation inhaler          STOP taking these medications       amLODIPine (NORVASC) 10 MG tablet Comments:   Reason for Stopping:         CHERATUSSIN AC  mg/5 mL syrup Comments:   Reason for Stopping:          guaifenesin-codeine 100-10 mg/5 ml (CHERATUSSIN AC)  mg/5 mL syrup Comments:   Reason for Stopping:               I certify that this patient is confined to his home and needs intermittent skilled nursing care.

## 2018-03-28 NOTE — DISCHARGE SUMMARY
[facility] Hospital Medicine  Discharge Summary      Patient Name: Rodolfo Messina  MRN: [id number]  Admission Date: 3/25/2018  Hospital Length of Stay: 3 days  Discharge Date and Time:  03/28/2018 12:10 PM  Attending Physician: Lilian Williamson MD   Discharging Provider: Gloria Merritt MD  Primary Care Provider: Deric Claudio MD  [facility] Medicine Team: [facility] 4 Gloria Merritt MD    HPI:   Mr. Reynolds is a 67 year old man with HTN and recently diagnosed with endocarditis, mod-severe AR, severe MR on SOWMYA (2/27), and anemia who presents to the ED for 5 days of lower extremity swelling.  Patient noticed lower extremity swelling that started this past Wednesday, for which he doubled his lasix dose (40mg qday to 40mg BID), but did not see any improvement.  He denies any pain to BLE, SOB or CP.  The Norvasc is a new medication since last discharge. He does report having a chronic, dry, non-productive cough since December that prevents him from laying flat because it worsens.  Noted to be relieved by chewing spearmint gum (witnessed during visit). 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  During his most recent admission, a CT chest found multiple nodules (<1cm) scattered throughout the lung. He was evaluated by pulmonology on 3/16 for this cough who recommended repeat CT in 3 months.  Reports fatigue but denies fevers, chills, appetite change, or weight loss.     Patient was recently hospitalized 2/21-3/6 for acute hypoxemic respiratory failure 2/2 cardiogenic edema and found to have endocarditis and enterococcus faecalis bacteremia.  During that admission, he had spent some time in the ICU, as well.  He was discharged with 6 weeks of antibiotics for endocarditis treatment.  In January 2018, he was admitted for symptomatic anemia and had an EGD and C scope (2/1/18) that showed non bleeding erosive gastropathy, internal hemorrhoid and  diverticulosis in sigmoid and descending colon.           Procedure(s) (LRB):  TRANSESOPHAGEAL ECHOCARDIOGRAM (SOWMYA) (N/A)      Hospital Course:   Patient admitted to hospital medicine on 3/25/18 for BLE swelling and concern for pneumonia.  In the ED, patient afebrile, no leukocytosis, normal procalcitonin, but CXR showed patchy consolidation in right middle to lower lung field. BLE US showed no signs of DVT. Hb was 7.5 but patient without signs of GIB and VSS stable.  He was given one dose of Vanc in the ED, and then started back on his home antibiotic regimen of Ampicillin and Rocephin. CT of his chest revealed multifocal consolidation and airspace disease in the right lower and middle lobes w/ associated small to moderate right pleural effusion. Pulm was consulted and recommended starting IV vanc and zosyn despite negative procal, no leukocytosis, no fevers and no worsening of symptoms. Thoracentesis was unable to be preformed given too small of a pocket to safely tap. ID was consulted as a recommendation from pulm. They felt that this was less likely a pneumonia and recommended not to change antibiotics. There was concern regarding changes in vegetation locations on his new echo. This was discussed with cardiology who reports that despite the discrepant reads, the vegetations are actually similar appearing and that the patient would not benefit from a repeat SOWMYA. Mr. Messina underwent MBSS which was negative for aspiration and was also started on a PPI which seemed to provide moderate relief in his coughing symptoms. He was also given codeine cough syrup which also seemed to ease his coughing. In regards to his swelling, he was provided with MOHAN hose which alleviated this problem.     He will need to follow up with urology regarding urine cytology and hematuria as an outpatient.     ROS on day of discharge:  Cough improved. He denies any fevers, chills, SOB, CP, N/V, diarrhea, constipation, or dysuria.    PE on  day of discharge:  Physical Exam   Constitutional: He is oriented to person, place, and time. He appears well-developed and well-nourished. No distress.   HENT:   Head: Normocephalic and atraumatic.   Eyes: Conjunctivae are normal.   Neck: Normal range of motion. Neck supple.   Cardiovascular: Normal rate, regular rhythm, normal heart sounds and intact distal pulses.    Pulmonary/Chest: Effort normal. No respiratory distress. He has rales (right middle to lower lung zones)- improving  Abdominal: Soft. He exhibits no distension. There is no tenderness.   Musculoskeletal: He exhibits no edema  Neurological: He is alert and oriented to person, place, and time.   Skin: There is pallor.   Psychiatric: He has a normal mood and affect. His behavior is normal.        Consults:   Consults         Status Ordering Provider     Inpatient consult to Infectious Diseases  Once     Provider:  (Not yet assigned)    MELE Ybarra     Inpatient consult to Pulmonology  Once     Provider:  (Not yet assigned)    MELE Ybarra          Aspiration pneumonia    See cough          Endocarditis    See cough.     - Diagnosed by SOWMYA on 2/27 presumably from enterococcus faecalis bacteremia.   - Has ID f/u on 3/27/18  - Last negative BCx 3/1 so receiving 6 weeks of Rocephin and Ampicillin (end date 4/12)  - Continue Rocephin 2g IV BID and Ampicillin 2g IV q4h          Chronic cough    Stable, unchanged, chronic  - DDx: Infection, pneumonia, septic emboli, malignancy  - Unclear cause of chronic cough at this time. CT Chest concerning with RLL and RML consolidations and air space disease, but no indication of systemic inflammation (no fever, no WBC, neg procal)  - likely secondary to reflux given improvement with PPI  - Echo concerning for evolving vegetation and septic emboli. Previously (2/24/2018) it was noted that vegetations were likely present on mitral valve with mild to moderate valvular dysfunction, a work up that was  triggered by consecutive blood cultures growing enterococcus faecalis. However, on SOWMYA performed 2/27/18 suggestive of mitral vegetation, w/ severe valvular dysfunction, in addition to vegetation on aortic valve. Most recent TTE now showing mitral valve vegetation and new tricuspid vegetation with tricuspid regurgitation.  - Reviewed echos with cards who report echoes are unchanged   - Continue broad spectrum therapy, repeat blood cultures obtained and are negative              Anemia    Stable, at baseline level  - Hb 7.5 upon admission (last Hb 9.1 on 3/15); Pt denies hematochezia or melena.  -  2/1/18 EGD and C scope showed non-bleeding erosive gastropathy, internal hemorrhoid and diverticulosis in sigmoid and descending colon  - Has GI appt on 3/29/18 this week for f/u  - F/u Iron studies   - U/A with hematuria. Pending final disposition, will have him follow up with urology as outpatient w/ cytology.   - Will continue to monitor Hb and transfuse Hb < 7              Final Active Diagnoses:    Diagnosis Date Noted POA    Edema [R60.9]  Unknown    Erosive gastropathy [K31.89]  Unknown    Aspiration pneumonia [J69.0]  Unknown    Lower extremity edema [R60.0] 03/25/2018 Unknown    Endocarditis [I38] 02/27/2018 Yes    Subacute bacterial endocarditis [I33.0] 02/27/2018 Yes    Chronic cough [R05] 02/22/2018 Yes    Anemia [D64.9] 01/27/2018 Yes    HTN (hypertension) [I10] 11/09/2014 Yes     Chronic      Problems Resolved During this Admission:    Diagnosis Date Noted Date Resolved POA    Hospital-acquired pneumonia [J18.9] 03/25/2018 03/28/2018 Yes       Discharged Condition: good    Disposition:     Follow Up:    Patient Instructions:   No discharge procedures on file.    Significant Diagnostic Studies: Labs: All labs within the past 24 hours have been reviewed    Pending Diagnostic Studies:     Procedure Component Value Units Date/Time    Cytology, urine [[id number]] Collected:  03/27/18 2106    Order Status:   Sent Lab Status:  In process Updated:  03/28/18 1002    Specimen:  Tissue from Voided/Clean Catch Urine     Transesophageal echo [[id number]]     Order Status:  Sent Lab Status:  No result          Medications:  Reconciled Home Medications:      Medication List      START taking these medications    pantoprazole 40 MG tablet  Commonly known as:  PROTONIX  Take 1 tablet (40 mg total) by mouth once daily.     promethazine-codeine 6.25-10 mg/5 ml 6.25-10 mg/5 mL syrup  Commonly known as:  PHENERGAN with CODEINE  Take 7.5 mLs by mouth every 4 (four) hours as needed for Cough.        CONTINUE taking these medications    albuterol-ipratropium 2.5mg-0.5mg/3mL 0.5 mg-3 mg(2.5 mg base)/3 mL nebulizer solution  Commonly known as:  DUO-NEB     AMPICILLIN 2 G/100 ML NS (READY TO MIX SYSTEM)  Inject 100 mLs (2 g total) into the vein every 4 (four) hours.     benzonatate 100 MG capsule  Commonly known as:  TESSALON     cefTRIAXone 2 g in dextrose 5 % 50 mL 2 g/50 mL Pgbk IVPB  Commonly known as:  ROCEPHIN  Inject 50 mLs (2 g total) into the vein every 12 (twelve) hours.     furosemide 40 MG tablet  Commonly known as:  LASIX  Take 1 tablet (40 mg total) by mouth once daily.     loratadine 10 mg tablet  Commonly known as:  CLARITIN     mirtazapine 30 MG tablet  Commonly known as:  REMERON  Take 1 tablet (30 mg total) by mouth every evening.     tamsulosin 0.4 mg Cp24  Commonly known as:  FLOMAX  Take 2 capsules (0.8 mg total) by mouth 2 (two) times daily.     VENTOLIN HFA 90 mcg/actuation inhaler  Generic drug:  albuterol        STOP taking these medications    amLODIPine 10 MG tablet  Commonly known as:  NORVASC     CHERATUSSIN AC  mg/5 mL syrup  Generic drug:  guaifenesin-codeine 100-10 mg/5 ml     guaifenesin-codeine 100-10 mg/5 ml  mg/5 mL syrup  Commonly known as:  CHERATUSSIN AC           Where to Get Your Medications      These medications were sent to [facility] [address] [place] [address] [address] [facility]  [address] [place] [address]    Phone:  [phone]   · pantoprazole 40 MG tablet     You can get these medications from any pharmacy    Bring a paper prescription for each of these medications  · promethazine-codeine 6.25-10 mg/5 ml 6.25-10 mg/5 mL syrup         Indwelling Lines/Drains at time of discharge:   Lines/Drains/Airways     Peripherally Inserted Central Catheter Line                 PICC Double Lumen 03/05/18 1539 right basilic 22 days                Time spent on the discharge of patient: 30 minutes  Patient was seen and examined on the date of discharge and determined to be suitable for discharge.         Gloria Merritt MD  Department of Hospital Medicine  [facility]

## 2018-03-28 NOTE — PLAN OF CARE
Infectious Disease Plan of Care Note    Pt doing well this AM, afebrile overnight. Dicussed with primary team results of repeat ECHO which turned out to be essentially unchanged from previous ECHO. Recommend continuing treatment with ampicillin and ceftriaxone at this time. Pt has follow up with ID clinic on 4/04/2018, will reassess pt at that time.     Guille Ramírez MD PhD  [employer] Infectious Diseases, PGY-4

## 2018-03-28 NOTE — PLAN OF CARE
Problem: Fall Risk (Adult)  Goal: Absence of Falls  Patient will demonstrate the desired outcomes by discharge/transition of care.   Outcome: Ongoing (interventions implemented as appropriate)   03/28/18 0606   Fall Risk (Adult)   Absence of Falls making progress toward outcome

## 2018-03-28 NOTE — CARE UPDATE
Discussed with cardiology fellow, Carin Kim, regarding discrepant reads on echo regarding the vegetations. She reports that upon review, the echo images are similar and reportedly unchanged. There will be no reported benefit to repeating SOWMYA at this time.       Gloria Merritt MD, PGY3  Pager [phone]

## 2018-03-28 NOTE — PLAN OF CARE
Patient arranged with [facility] and [facility], notified Asiya with [facility] and Zunilda with [facility] that patient is discharging today, no other needs at this time, will follow.       03/28/18 1208   Final Note   Assessment Type Final Discharge Note   Discharge Disposition Home-[facility]   Hospital Follow Up  Appt(s) scheduled? Yes   Discharge plans and expectations educations in teach back method with documentation complete? Yes   Right Care Referral Info   Post Acute Recommendation Home-care

## 2018-03-28 NOTE — NURSING
I called the doctor to ask her if she would like to hold his morning medication because he was scheduled for a SOWMYA. She stated that I can hold all oral medication and give them when he returns from the test.

## 2018-03-28 NOTE — PLAN OF CARE
Problem: Infection, Risk/Actual (Adult)  Intervention: Manage Suspected/Actual Infection   03/28/18 0412   Prevent/Manage Colorectal Surgical Infection   Fever Reduction/Comfort Measures medication administered   Safety Interventions   Infection Management aseptic technique maintained

## 2018-03-29 ENCOUNTER — OFFICE VISIT (OUTPATIENT)
Dept: GASTROENTEROLOGY | Facility: CLINIC | Age: 68
End: 2018-03-29
Payer: MEDICARE

## 2018-03-29 VITALS
BODY MASS INDEX: 23.72 KG/M2 | HEIGHT: 68 IN | WEIGHT: 156.5 LBS | DIASTOLIC BLOOD PRESSURE: 66 MMHG | HEART RATE: 99 BPM | SYSTOLIC BLOOD PRESSURE: 125 MMHG

## 2018-03-29 DIAGNOSIS — D64.9 NORMOCYTIC ANEMIA: Primary | ICD-10-CM

## 2018-03-29 DIAGNOSIS — I38 ENDOCARDITIS, UNSPECIFIED CHRONICITY, UNSPECIFIED ENDOCARDITIS TYPE: ICD-10-CM

## 2018-03-29 DIAGNOSIS — I34.0 MITRAL VALVE INSUFFICIENCY, UNSPECIFIED ETIOLOGY: ICD-10-CM

## 2018-03-29 DIAGNOSIS — I35.1 AORTIC VALVE INSUFFICIENCY, ETIOLOGY OF CARDIAC VALVE DISEASE UNSPECIFIED: ICD-10-CM

## 2018-03-29 DIAGNOSIS — I10 ESSENTIAL HYPERTENSION: ICD-10-CM

## 2018-03-29 PROCEDURE — 3078F DIAST BP <80 MM HG: CPT | Mod: CPTII,GC,S$GLB, | Performed by: STUDENT IN AN ORGANIZED HEALTH CARE EDUCATION/TRAINING PROGRAM

## 2018-03-29 PROCEDURE — 99999 PR PBB SHADOW E&M-EST. PATIENT-LVL IV: CPT | Mod: PBBFAC,GC,, | Performed by: STUDENT IN AN ORGANIZED HEALTH CARE EDUCATION/TRAINING PROGRAM

## 2018-03-29 PROCEDURE — 99213 OFFICE O/P EST LOW 20 MIN: CPT | Mod: GC,S$GLB,, | Performed by: STUDENT IN AN ORGANIZED HEALTH CARE EDUCATION/TRAINING PROGRAM

## 2018-03-29 PROCEDURE — 3074F SYST BP LT 130 MM HG: CPT | Mod: CPTII,GC,S$GLB, | Performed by: STUDENT IN AN ORGANIZED HEALTH CARE EDUCATION/TRAINING PROGRAM

## 2018-03-29 RX ORDER — POLYETHYLENE GLYCOL 3350, SODIUM SULFATE ANHYDROUS, SODIUM BICARBONATE, SODIUM CHLORIDE, POTASSIUM CHLORIDE 236; 22.74; 6.74; 5.86; 2.97 G/4L; G/4L; G/4L; G/4L; G/4L
4 POWDER, FOR SOLUTION ORAL ONCE
Qty: 4000 ML | Refills: 0 | Status: SHIPPED | OUTPATIENT
Start: 2018-03-29 | End: 2018-03-29

## 2018-03-29 NOTE — PLAN OF CARE
"CM to bedside - pt having testing w/ pulm; CM obtained info from prior admit. Pt is independent w/ no DME in place, lives w/ spouse. Pt is active w/ [facility] and [facility] - expected to resume the same 2 IV abx at d/c.     CM provided patient anticipated ELIEL which will be update by nursing staff. Patient was not provided a Blue "My Health Packet" for d/c planning and health tool - unable to get into room.      03/29/18 1608   Discharge Assessment   Assessment Type Discharge Planning Assessment   Confirmed/corrected address and phone number on facesheet? Yes   Assessment information obtained from? Patient;Medical Record  (pt having testing; info from prior admit)   Expected Length of Stay (days) 3   Communicated expected length of stay with patient/caregiver yes   Prior to hospitilization cognitive status: Alert/Oriented   Prior to hospitalization functional status: Independent   Current cognitive status: Alert/Oriented   Current Functional Status: Independent   Facility Arrived From: N/A   Lives With spouse   Able to Return to Prior Arrangements yes   Is patient able to care for self after discharge? Yes   Who are your caregiver(s) and their phone number(s)? spouse - [place] [phone]   Patient's perception of discharge disposition home health   Readmission Within The Last 30 Days unable to assess   Patient currently being followed by outpatient case management? No   Patient currently receives any other outside agency services? Yes   Name and contact number of agency or person providing outside services [facility]; [facility]   Is it the patient/care giver preference to resume care with the current outside agency? Yes   Equipment Currently Used at Home none   Do you have any problems affording any of your prescribed medications? No   Is the patient taking medications as prescribed? yes   Does the patient have transportation home? Yes   Transportation Available family or friend will provide;car "   Dialysis Name and Scheduled days N/A   Does the patient receive services at the [facility]mad[facility] Clinic? No   Discharge Plan A Home with family;Home Health   Discharge Plan B Home with family   Patient/Family In Agreement With Plan yes

## 2018-03-30 NOTE — PROGRESS NOTES
I was present with Lisandro Bettencourt MD the fellow during the above evaluation, including history and exam.  I discussed the case with the fellow and agree with the findings and plan as documented in the fellow's note.

## 2018-03-30 NOTE — PATIENT INSTRUCTIONS
--VCE to evaluate small bowel, if you decide to move forward with test please call our office    Lisandro Bettencourt M.D.  Gastroenterology Fellow, PGY-IV  [facility]

## 2018-03-30 NOTE — PROGRESS NOTES
Gastroenterology Clinic    Reason for visit: The primary encounter diagnosis was Normocytic anemia. Diagnoses of Essential hypertension, Endocarditis, unspecified chronicity, unspecified endocarditis type, Aortic valve insufficiency, etiology of cardiac valve disease unspecified, and Mitral valve insufficiency, unspecified etiology were also pertinent to this visit.  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    In 2015 patient hemoglobin was ~ 12.5 and since the beginning of this year he has been fluctuating between 7-9 (of now we have a 3 year gap during which time we are unsure as to what his hemoglobin doing).  He denies NSAIDs, AC, or current alcohol/tobacco use. Also denies any hematemesis, melena, or BRBPR.   EGD/colon (full report below) with no obvious source to account for his anemia.   Prior to being seen by us today he saw [facility] on 3/15/18 who felt that anemia could be secondary to ongoing inflammation/reactive process.     PEndoHx:  EGD 02/1018  - Small hiatal hernia.  - Non-bleeding erosive gastropathy, biopsied.  - Normal examined duodenum, biopsied.    Colon 02/2018  - Internal hemorrhoids.  - Diverticulosis in the sigmoid colon and in the descending colon.    Review of Systems:  Constitutional: no fever, chills or change in weight   Eyes: no visual changes   ENT: no sore throat or dysphagia  Respiratory: no cough or shortness of breath   Cardiovascular: no chest pain or palpitations   Gastrointestinal: as per HPI  Hematologic/Lymphatic: no easy bruising or lymphadenopathy   Musculoskeletal: no arthralgias or myalgias   Neurological: no change in mental status  Behavioral/Psych: no change in mood      Past Medical History:   Diagnosis Date    ANNA (acute kidney injury) 2/22/2018    Anemia     Anxiety     BPH  (benign prostatic hyperplasia)     Diverticulosis     Hypertension     Urinary retention        Past Surgical History:   Procedure Laterality Date    APPENDECTOMY      COLONOSCOPY      COLONOSCOPY N/A 2/1/2018    Procedure: COLONOSCOPY;  Surgeon: Richar Payan MD;  Location: [address];  Service: Endoscopy;  Laterality: N/A;  PM prep    EYE SURGERY Right     cataract extraction    FINGER SURGERY      FRACTURE SURGERY Right     index finger    TONSILLECTOMY         Family History   Problem Relation Age of Onset    Stroke Mother     Heart disease Mother      CABG    Heart disease Father      CABG    No Known Problems Sister     No Known Problems Brother        Social History     Social History    Marital status:      Spouse name: N/A    Number of children: N/A    Years of education: N/A     Occupational History     [employer]     Social History Main Topics    Smoking status: Never Smoker    Smokeless tobacco: Never Used    Alcohol use Yes      Comment: none recently    Drug use: No    Sexual activity: Not Currently     Partners: Female     Other Topics Concern    Not on file     Social History Narrative    No narrative on file       Current Outpatient Prescriptions on File Prior to Visit   Medication Sig Dispense Refill    albuterol-ipratropium 2.5mg-0.5mg/3mL (DUO-NEB) 0.5 mg-3 mg(2.5 mg base)/3 mL nebulizer solution 3 mLs every 4 (four) hours as needed.       AMPICILLIN SODIUM (AMPICILLIN 2 G/100 ML NS, READY TO MIX SYSTEM,) Inject 100 mLs (2 g total) into the vein every 4 (four) hours. 17927 mL 1    benzonatate (TESSALON) 100 MG capsule Take 100 mg by mouth 3 (three) times daily as needed for Cough.      cefTRIAXone 2 g in dextrose 5 % 50 mL (ROCEPHIN) 2 g/50 mL PgBk IVPB Inject 50 mLs (2 g total) into the vein every 12 (twelve) hours. 60 each 1    furosemide (LASIX) 40 MG tablet Take 1 tablet (40 mg total) by mouth once daily. 30 tablet 1    loratadine (CLARITIN) 10  "mg tablet Take 10 mg by mouth once daily.      mirtazapine (REMERON) 30 MG tablet Take 1 tablet (30 mg total) by mouth every evening. 30 tablet 3    pantoprazole (PROTONIX) 40 MG tablet Take 1 tablet (40 mg total) by mouth once daily. 30 tablet 5    promethazine-codeine 6.25-10 mg/5 ml (PHENERGAN WITH CODEINE) 6.25-10 mg/5 mL syrup Take 7.5 mLs by mouth every 4 (four) hours as needed for Cough. 1 Bottle 0    tamsulosin (FLOMAX) 0.4 mg Cp24 Take 2 capsules (0.8 mg total) by mouth 2 (two) times daily. 120 capsule 11    VENTOLIN HFA 90 mcg/actuation inhaler        No current facility-administered medications on file prior to visit.        Review of patient's allergies indicates:   Allergen Reactions    Augmentin [amoxicillin-pot clavulanate]      Patient felt that it raised BP and requested to have it added as intolerance. Tolerated unasyn    Ciprofloxacin     Flagyl [metronidazole]     Lisinopril Other (See Comments)     cough    Mysoline [primidone]     Omnicef [cefdinir]     Shellfish containing products        Physical Exam:  /66   Pulse 99   Ht 5' 8" (1.727 m)   Wt 71 kg (156 lb 8.4 oz)   BMI 23.80 kg/m²   General appearance: alert, appears stated age and cooperative  Head: Normocephalic, without obvious abnormality, atraumatic  Lungs: clear to auscultation bilaterally  Chest wall: no tenderness  Heart: regular rate and rhythm, S1, S2 normal, no murmur, click, rub or gallop  Abdomen: soft, non-tender; bowel sounds normal; no masses,  no organomegaly  Extremities: extremities normal, atraumatic, no cyanosis or edema LICC line on the right upper extremity    Laboratory:  Lab Results   Component Value Date    WBC 7.96 03/28/2018    HGB 7.5 (L) 03/28/2018    HCT 24.0 (L) 03/28/2018    MCV 92 03/28/2018     03/28/2018     CMP  Sodium   Date Value Ref Range Status   03/28/2018 142 136 - 145 mmol/L Final   05/26/2016 143  Final     Potassium   Date Value Ref Range Status   03/28/2018 4.1 3.5 " - 5.1 mmol/L Final   05/26/2016 4.9  Final     Chloride   Date Value Ref Range Status   03/28/2018 108 95 - 110 mmol/L Final   05/26/2016 110 (A) 98 - 107 Final     CO2   Date Value Ref Range Status   03/28/2018 24 23 - 29 mmol/L Final     Glucose   Date Value Ref Range Status   03/28/2018 90 70 - 110 mg/dL Final     BUN, Bld   Date Value Ref Range Status   03/28/2018 21 8 - 23 mg/dL Final     BUN   Date Value Ref Range Status   05/26/2016 20 4 - 21 mg/dL Final     Creatinine   Date Value Ref Range Status   03/28/2018 1.5 (H) 0.5 - 1.4 mg/dL Final   05/26/2016 1.2 mg/dL Final     Calcium   Date Value Ref Range Status   03/28/2018 9.1 8.7 - 10.5 mg/dL Final   05/26/2016 9.4 mg/dL Final     Total Protein   Date Value Ref Range Status   03/28/2018 5.9 (L) 6.0 - 8.4 g/dL Final     Albumin   Date Value Ref Range Status   03/28/2018 2.3 (L) 3.5 - 5.2 g/dL Final     Total Bilirubin   Date Value Ref Range Status   03/28/2018 0.4 0.1 - 1.0 mg/dL Final     Comment:     For infants and newborns, interpretation of results should be based  on gestational age, weight and in agreement with clinical  observations.  Premature Infant recommended reference ranges:  Up to 24 hours.............<8.0 mg/dL  Up to 48 hours............<12.0 mg/dL  3-5 days..................<15.0 mg/dL  6-29 days.................<15.0 mg/dL       Alkaline Phosphatase   Date Value Ref Range Status   03/28/2018 57 55 - 135 U/L Final     AST   Date Value Ref Range Status   03/28/2018 18 10 - 40 U/L Final   05/26/2016 113 U/L Final     ALT   Date Value Ref Range Status   03/28/2018 14 10 - 44 U/L Final   05/26/2016 9 U/L Final     Anion Gap   Date Value Ref Range Status   03/28/2018 10 8 - 16 mmol/L Final     eGFR if    Date Value Ref Range Status   03/28/2018 54.9 (A) >60 mL/min/1.73 m^2 Final     eGFR if non    Date Value Ref Range Status   03/28/2018 47.5 (A) >60 mL/min/1.73 m^2 Final     Comment:     Calculation used to obtain  the estimated glomerular filtration  rate (eGFR) is the CKD-EPI equation.          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    Plan:  Normocytic anemia in the setting of no overt signs of bleeding and normal E/C and ongoing infection with endocarditis. In the setting of a ferritin greater than 800 it is likely that his anemia is partly due to his underlying inflammatory/infectious process however with a low iron would like to proceed with a VCE to make sure we are not missing a slow bleed in the small bowel.  --VCE to further evaluate anemia  --Order for VCE and prep ordered however patient would like to think about undergoing VCE as he feels weak. He states that he has been in and out of the hospital and would like to recover before undergoing another test. Patient provided with our contact information and informed that if/when he was ready to undergo VCE he needed to contact our office in order to sign his consent/move forward with scheduling.    RTC as needed    Lisandro Bettencourt M.D.  Gastroenterology Fellow, PGY-IV  Pager: [phone]  [facility]      Orders Placed This Encounter   Procedures    Capsule Video Endoscopy

## 2018-03-31 LAB
BACTERIA SPEC AEROBE CULT: NORMAL
GRAM STN SPEC: NORMAL

## 2018-04-01 LAB
BACTERIA BLD CULT: NORMAL
BACTERIA BLD CULT: NORMAL

## 2018-04-04 ENCOUNTER — OFFICE VISIT (OUTPATIENT)
Dept: INFECTIOUS DISEASES | Facility: CLINIC | Age: 68
End: 2018-04-04
Payer: MEDICARE

## 2018-04-04 VITALS
HEIGHT: 68 IN | SYSTOLIC BLOOD PRESSURE: 126 MMHG | WEIGHT: 156.5 LBS | BODY MASS INDEX: 23.72 KG/M2 | DIASTOLIC BLOOD PRESSURE: 67 MMHG | TEMPERATURE: 98 F

## 2018-04-04 DIAGNOSIS — I33.0 SUBACUTE BACTERIAL ENDOCARDITIS: Primary | ICD-10-CM

## 2018-04-04 PROCEDURE — 99214 OFFICE O/P EST MOD 30 MIN: CPT | Mod: S$GLB,,, | Performed by: INTERNAL MEDICINE

## 2018-04-04 PROCEDURE — 99999 PR PBB SHADOW E&M-EST. PATIENT-LVL III: CPT | Mod: PBBFAC,,, | Performed by: INTERNAL MEDICINE

## 2018-04-04 RX ORDER — POLYETHYLENE GLYCOL 3350, SODIUM SULFATE ANHYDROUS, SODIUM BICARBONATE, SODIUM CHLORIDE, POTASSIUM CHLORIDE 236; 22.74; 6.74; 5.86; 2.97 G/4L; G/4L; G/4L; G/4L; G/4L
POWDER, FOR SOLUTION ORAL
COMMUNITY
Start: 2018-03-31 | End: 2018-04-18

## 2018-04-04 NOTE — PROGRESS NOTES
Subjective:      Patient ID: Rodolfo Messina is a 67 y.o. male.    Chief Complaint:Follow-up    History of Present Illness    Mr. 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  He was discharged on IV ampicillin and IV ceftriaxone.  He presents today for follow up.  He has had some issues with LE edema but has otherwise been doing well on the antibiotics.  He also reports coughing X 3 months.      Review of Systems   Constitution: Negative for chills, decreased appetite, fever, weakness, malaise/fatigue, night sweats, weight gain and weight loss.   HENT: Negative for congestion, ear pain, hearing loss, hoarse voice, sore throat and tinnitus.    Eyes: Negative for blurred vision, redness and visual disturbance.   Cardiovascular: Negative for chest pain, leg swelling and palpitations.   Respiratory: Negative for cough, hemoptysis, shortness of breath and sputum production.    Hematologic/Lymphatic: Negative for adenopathy. Does not bruise/bleed easily.   Skin: Negative for dry skin, itching, rash and suspicious lesions.   Musculoskeletal: Negative for back pain, joint pain, myalgias and neck pain.   Gastrointestinal: Negative for abdominal pain, constipation, diarrhea, heartburn, nausea and vomiting.   Genitourinary: Negative for dysuria, flank pain, frequency, hematuria, hesitancy and urgency.   Neurological: Negative for dizziness, headaches, numbness and paresthesias.   Psychiatric/Behavioral: Negative for depression and memory loss. The patient does not have insomnia and is not nervous/anxious.      Objective:   Physical Exam   Constitutional: He is oriented to person, place, and time. He appears well-developed and well-nourished. No distress.   HENT:   Head: Normocephalic and atraumatic.   Right Ear: External ear normal.   Left Ear: External ear normal.   Nose: Nose normal.   Mouth/Throat: Oropharynx is clear and moist. No  oropharyngeal exudate.   Eyes: Conjunctivae and EOM are normal. Pupils are equal, round, and reactive to light. Right eye exhibits no discharge. Left eye exhibits no discharge. No scleral icterus.   Neck: Normal range of motion. Neck supple. No JVD present. No tracheal deviation present. No thyromegaly present.   Cardiovascular: Normal rate, regular rhythm, normal heart sounds and intact distal pulses.  Exam reveals no gallop and no friction rub.    No murmur heard.  Pulmonary/Chest: Effort normal and breath sounds normal. No stridor. No respiratory distress. He has no wheezes. He has no rales. He exhibits no tenderness.   Abdominal: Soft. Bowel sounds are normal. He exhibits no distension and no mass. There is no tenderness. There is no rebound and no guarding.   Musculoskeletal: Normal range of motion. He exhibits no edema or tenderness.   Lymphadenopathy:     He has no cervical adenopathy.   Neurological: He is alert and oriented to person, place, and time. He has normal reflexes. He displays normal reflexes. No cranial nerve deficit. He exhibits normal muscle tone. Coordination normal.   Skin: Skin is warm. No rash noted. He is not diaphoretic. No erythema. No pallor.   Psychiatric: He has a normal mood and affect. His behavior is normal. Judgment and thought content normal.   Nursing note and vitals reviewed.    Assessment:       1. Subacute bacterial endocarditis        Labs and studies reviewed.  SOWMYA on February 2/27/18 showed probable vegetations on aortic and mitral valves.  Follow up TTE on march 26 shows vegetation on mitral valve and a 1.3cm vegetation on the tricuspid valve.  Unclear if this vegetation seen on the tricuspid valve is new as it wasn't commented on in previous studies.  Currently on IV ceftriaxone and IV ampicillin for a planned 6 week course.  Anticipated stop date would be next week, April 12.  He is to follow up with CT surgery for consideration for surgery.  He will follow up with me  afterwards  Plan:       Subacute bacterial endocarditis   -Continue IV ampicillin and IV ceftriaxone   -Patient is to follow up with CT surgery

## 2018-04-09 ENCOUNTER — TELEPHONE (OUTPATIENT)
Dept: INFECTIOUS DISEASES | Facility: CLINIC | Age: 68
End: 2018-04-09

## 2018-04-09 ENCOUNTER — TELEPHONE (OUTPATIENT)
Dept: FAMILY MEDICINE | Facility: CLINIC | Age: 68
End: 2018-04-09

## 2018-04-09 DIAGNOSIS — Z79.2 LONG TERM (CURRENT) USE OF ANTIBIOTICS: Primary | ICD-10-CM

## 2018-04-09 NOTE — TELEPHONE ENCOUNTER
----- Message from Monse Agosto sent at 4/9/2018  3:33 PM CDT -----  Contact: Lupe Messina  [phone]   Asiya  Please give pt's wife a returned call. Wants to know what was pt's blood count. Had lab drawn last week @ [facility]

## 2018-04-09 NOTE — TELEPHONE ENCOUNTER
I spoke with the pt wife - she thinks the pt does not look good but she feels reassured with the current CBC count. It is better then upon  D/c   nurse will rtn to there house again this week for another count  pic line is supposed to be removed this week

## 2018-04-09 NOTE — TELEPHONE ENCOUNTER
----- Message from Denice Mercedesar sent at 4/9/2018 11:34 AM CDT -----  Contact: Wife  The [facility] agency did blood work on him last week.  She wanted to know if Dr Claudio had received the results from the agency. [facility] is the agency.. Please call her at [phone].  She is really concerned.        I know you reviewed the results - do you have any concerns with the results?  I spoke with [facility]  Dr Lion ordered the labs - cbc weekly

## 2018-04-09 NOTE — TELEPHONE ENCOUNTER
----- Message from Lauren Del Rosario sent at 4/9/2018 11:05 AM CDT -----  Contact: Wife:  Lupe  tel:  [phone]   Pt. Has a port, and you said you would have the port removed on Thursday.    Where do they go for this?   Needs to discuss this w/ you.   Pls call.

## 2018-04-09 NOTE — TELEPHONE ENCOUNTER
Spoke with [facility] and let them know that the patient will DC IV ABX on 4/12 and will have picc line pulled in ID infusion suite. DILLON

## 2018-04-12 ENCOUNTER — TELEPHONE (OUTPATIENT)
Dept: INFECTIOUS DISEASES | Facility: CLINIC | Age: 68
End: 2018-04-12

## 2018-04-12 ENCOUNTER — INFUSION (OUTPATIENT)
Dept: INFECTIOUS DISEASES | Facility: HOSPITAL | Age: 68
End: 2018-04-12
Attending: INTERNAL MEDICINE
Payer: MEDICARE

## 2018-04-12 VITALS
HEIGHT: 68 IN | SYSTOLIC BLOOD PRESSURE: 159 MMHG | TEMPERATURE: 99 F | HEART RATE: 102 BPM | DIASTOLIC BLOOD PRESSURE: 74 MMHG | OXYGEN SATURATION: 96 % | RESPIRATION RATE: 16 BRPM | WEIGHT: 161.69 LBS | BODY MASS INDEX: 24.5 KG/M2

## 2018-04-12 NOTE — TELEPHONE ENCOUNTER
Pt wants to get his blood count result and a refill on his cough medication with codeine send to the [facility] pharmacy .

## 2018-04-12 NOTE — PROGRESS NOTES
RAFA PICC line removed, pt tolerated well. Pt instructed to leave dressing on for 24 hrs, verbalized understanding. Pt left in NAD.

## 2018-04-13 ENCOUNTER — TELEPHONE (OUTPATIENT)
Dept: FAMILY MEDICINE | Facility: CLINIC | Age: 68
End: 2018-04-13

## 2018-04-13 RX ORDER — PROMETHAZINE HYDROCHLORIDE AND CODEINE PHOSPHATE 6.25; 1 MG/5ML; MG/5ML
5 SOLUTION ORAL EVERY 4 HOURS PRN
Qty: 180 ML | Refills: 1 | Status: SHIPPED | OUTPATIENT
Start: 2018-04-13 | End: 2018-04-23

## 2018-04-13 NOTE — TELEPHONE ENCOUNTER
----- Message from Denice Addison sent at 4/13/2018  3:18 PM CDT -----  Contact: Wife / [phone]  Would like to get a cough medicine with kenneth in it sent to the pharmacy.  Dr Ashraf initially prescribed it for him but instructed him to call primary care for the refill.   Mayuri did not have the name of the medicine. Possibly PROMETHAZINE HCL/CODEINE .  Please send to the [facility] here in [place].  Mrs Messina is aware that Dr Claudio is out of clinic this afternoon.

## 2018-04-13 NOTE — TELEPHONE ENCOUNTER
Spoke to him about his blood work from April 5.  He had some blood work drawn yesterday.  Will review it when I receive it.

## 2018-04-16 ENCOUNTER — OFFICE VISIT (OUTPATIENT)
Dept: CARDIOTHORACIC SURGERY | Facility: CLINIC | Age: 68
End: 2018-04-16
Payer: MEDICARE

## 2018-04-16 ENCOUNTER — TELEPHONE (OUTPATIENT)
Dept: TRANSPLANT | Facility: CLINIC | Age: 68
End: 2018-04-16

## 2018-04-16 ENCOUNTER — HOSPITAL ENCOUNTER (OUTPATIENT)
Dept: CARDIOLOGY | Facility: CLINIC | Age: 68
Discharge: HOME | End: 2018-04-16
Payer: MEDICARE

## 2018-04-16 VITALS
HEART RATE: 94 BPM | OXYGEN SATURATION: 96 % | DIASTOLIC BLOOD PRESSURE: 83 MMHG | BODY MASS INDEX: 23.39 KG/M2 | HEIGHT: 69 IN | WEIGHT: 157.94 LBS | SYSTOLIC BLOOD PRESSURE: 155 MMHG | TEMPERATURE: 98 F

## 2018-04-16 DIAGNOSIS — I50.9 CONGESTIVE HEART FAILURE, UNSPECIFIED CONGESTIVE HEART FAILURE CHRONICITY, UNSPECIFIED CONGESTIVE HEART FAILURE TYPE: Primary | ICD-10-CM

## 2018-04-16 DIAGNOSIS — I38 ENDOCARDITIS, UNSPECIFIED CHRONICITY, UNSPECIFIED ENDOCARDITIS TYPE: Primary | ICD-10-CM

## 2018-04-16 DIAGNOSIS — I38 ENDOCARDITIS, UNSPECIFIED CHRONICITY, UNSPECIFIED ENDOCARDITIS TYPE: ICD-10-CM

## 2018-04-16 DIAGNOSIS — I35.1 NONRHEUMATIC AORTIC VALVE INSUFFICIENCY: ICD-10-CM

## 2018-04-16 LAB
AORTIC VALVE REGURGITATION: ABNORMAL
ESTIMATED PA SYSTOLIC PRESSURE: 85.44
MITRAL VALVE MOBILITY: NORMAL
MITRAL VALVE REGURGITATION: ABNORMAL
RETIRED EF AND QEF - SEE NOTES: 60 (ref 55–65)

## 2018-04-16 PROCEDURE — 93306 TTE W/DOPPLER COMPLETE: CPT | Mod: S$GLB,,, | Performed by: INTERNAL MEDICINE

## 2018-04-16 PROCEDURE — 99999 PR PBB SHADOW E&M-EST. PATIENT-LVL III: CPT | Mod: PBBFAC,,, | Performed by: THORACIC SURGERY (CARDIOTHORACIC VASCULAR SURGERY)

## 2018-04-16 PROCEDURE — 99499 UNLISTED E&M SERVICE: CPT | Mod: S$GLB,,, | Performed by: THORACIC SURGERY (CARDIOTHORACIC VASCULAR SURGERY)

## 2018-04-16 PROCEDURE — 99215 OFFICE O/P EST HI 40 MIN: CPT | Mod: S$GLB,,, | Performed by: THORACIC SURGERY (CARDIOTHORACIC VASCULAR SURGERY)

## 2018-04-16 PROCEDURE — 3077F SYST BP >= 140 MM HG: CPT | Mod: CPTII,S$GLB,, | Performed by: THORACIC SURGERY (CARDIOTHORACIC VASCULAR SURGERY)

## 2018-04-16 PROCEDURE — 3079F DIAST BP 80-89 MM HG: CPT | Mod: CPTII,S$GLB,, | Performed by: THORACIC SURGERY (CARDIOTHORACIC VASCULAR SURGERY)

## 2018-04-16 NOTE — PROGRESS NOTES
Subjective:      Patient ID: Rodolfo Messina is a 67 y.o. male.    Chief Complaint: Follow-up      HPI:  Rodolfo Messina is a 67 y.o. male who presents as as hospital follow up for infective endocarditis. He has severe MR with a healed vegetation and a prolapsed tricuspid valve with moderate regurgitation. He had originally been admitted for symptomatic anemia. His evaluation included upper and lower endoscopy with no active bleeding identified. During his admission he received transfusions of RBC and IV fluid resuscitation at the thought at the time was he was hypovolemic from bowel preparation and possible pneumonia with sepsis. After this he became hypoxemic requiring comfort flow oxygen. An echocardiogram was performed. 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 He was discharged on IV ampicillin and IV ceftriaxone. He was also seen in GI clinic; VCE was recommended to assess normocytic anemia. He wishes to wait to do this for several weeks as he recovers from his hospital admission.     Current medications Reviewed    Review of Systems   Constitutional: Negative for activity change, appetite change, fatigue and fever.   HENT: Negative for congestion, dental problem, sneezing and sore throat.    Eyes: Negative for pain, discharge and visual disturbance.   Respiratory: Positive for cough and shortness of breath. Negative for wheezing.    Cardiovascular: Positive for leg swelling. Negative for chest pain and palpitations.   Gastrointestinal: Negative for abdominal distention, abdominal pain, constipation, diarrhea, nausea and vomiting.   Endocrine: Negative for polydipsia, polyphagia and polyuria.   Genitourinary: Negative for dysuria, frequency and urgency.   Musculoskeletal: Negative for back pain and gait problem.   Skin: Negative for rash and wound.   Neurological: Negative for dizziness, seizures, syncope and headaches.   Hematological: Does not bruise/bleed easily.   Psychiatric/Behavioral: Negative  for agitation and confusion. The patient is not nervous/anxious.      Objective:   Physical Exam   Constitutional: He is oriented to person, place, and time. He appears well-developed and well-nourished.   HENT:   Head: Normocephalic and atraumatic.   Eyes: Pupils are equal, round, and reactive to light.   Neck: Normal range of motion. Neck supple.   Cardiovascular: Normal rate and regular rhythm.    Murmur heard.  Pulmonary/Chest: Effort normal and breath sounds normal.   Abdominal: Soft. Bowel sounds are normal.   Musculoskeletal: Normal range of motion. He exhibits edema.   Neurological: He is alert and oriented to person, place, and time.   Skin: Skin is warm and dry.   Psychiatric: He has a normal mood and affect. His behavior is normal.       Diagnotic Results:    2D ECHO 4/216/18  1 - Eccentric hypertrophy.     2 - Normal left ventricular systolic function (EF 60-65%).     3 - Right ventricle is upper limit of normal in size with normal systolic function.     4 - Biatrial enlargement.     5 - The tricuspid valve has prolapse of the anterior leaflet with moderate regurgitation.  A mass associated with it cannot be entirely excluded (see clip #24), but the echodensity seen on what appears to be the atrial aspect of the valve is likely   prolapsing leaflet.     6 - Mild aortic regurgitation.     7 - The mitral valve is thickened with evidence of what appears to be old/healed vegetation.  There is severe mitral regurgitation.     8 - Pulmonary hypertension. The estimated PA systolic pressure is 85 mmHg.     9 - Intermediate central venous pressure.    SOWMYA 2/27/18    1 - No visualized thrombus in the left atrial appendage.     2 - Left atrium is enlarged.     3 - Normal left ventricular systolic function (EF 65-70%).     4 - Moderate to severe aortic regurgitation.     5 - Severe mitral regurgitation.     6 - Echodensity visualized on the aortic valve consistent with vegetation ( see text).     7 - Echodensity  visualized on the mitral valve consistent with vegetation ( see text).     8 - Grade 2 atheroma disease of aorta.     Assessment:   Rodolfo Messina is a 67 y.o. male who presents as as hospital follow up for infective endocarditis. He has severe MR with a healed vegetation and a prolapsed tricuspid valve with moderate regurgitation. He is SOB and displays LE edema.  Plan:   Pt will likely need MVR and TVr. Pt will need a LHC, non-contrast chest CT and carotid US as part of pre op testing. Will send referral to interventional, Dr. RAPHAEL Borrego and to heart failure, Dr. Horner for heart failure medication management.

## 2018-04-18 ENCOUNTER — INITIAL CONSULT (OUTPATIENT)
Dept: TRANSPLANT | Facility: CLINIC | Age: 68
End: 2018-04-18
Payer: MEDICARE

## 2018-04-18 ENCOUNTER — LAB VISIT (OUTPATIENT)
Dept: LAB | Facility: HOSPITAL | Age: 68
End: 2018-04-18
Attending: INTERNAL MEDICINE
Payer: MEDICARE

## 2018-04-18 VITALS
HEART RATE: 88 BPM | WEIGHT: 156.75 LBS | DIASTOLIC BLOOD PRESSURE: 67 MMHG | BODY MASS INDEX: 23.22 KG/M2 | SYSTOLIC BLOOD PRESSURE: 141 MMHG | HEIGHT: 69 IN

## 2018-04-18 DIAGNOSIS — I50.9 CONGESTIVE HEART FAILURE, UNSPECIFIED CONGESTIVE HEART FAILURE CHRONICITY, UNSPECIFIED CONGESTIVE HEART FAILURE TYPE: ICD-10-CM

## 2018-04-18 DIAGNOSIS — D64.9 ANEMIA, UNSPECIFIED TYPE: Primary | ICD-10-CM

## 2018-04-18 DIAGNOSIS — I27.22 PULMONARY HYPERTENSION DUE TO MITRAL VALVE DISEASE: ICD-10-CM

## 2018-04-18 DIAGNOSIS — I33.0 SUBACUTE BACTERIAL ENDOCARDITIS: ICD-10-CM

## 2018-04-18 DIAGNOSIS — I05.9 PULMONARY HYPERTENSION DUE TO MITRAL VALVE DISEASE: ICD-10-CM

## 2018-04-18 DIAGNOSIS — I05.9 ENDOCARDITIS OF MITRAL VALVE: ICD-10-CM

## 2018-04-18 LAB
ALBUMIN SERPL BCP-MCNC: 3.1 G/DL
ALP SERPL-CCNC: 75 U/L
ALT SERPL W/O P-5'-P-CCNC: 12 U/L
ANION GAP SERPL CALC-SCNC: 7 MMOL/L
AST SERPL-CCNC: 15 U/L
BASOPHILS # BLD AUTO: 0.04 K/UL
BASOPHILS NFR BLD: 0.7 %
BILIRUB SERPL-MCNC: 0.7 MG/DL
BNP SERPL-MCNC: 245 PG/ML
BUN SERPL-MCNC: 14 MG/DL
CALCIUM SERPL-MCNC: 9.4 MG/DL
CHLORIDE SERPL-SCNC: 105 MMOL/L
CO2 SERPL-SCNC: 30 MMOL/L
CREAT SERPL-MCNC: 1.6 MG/DL
DIFFERENTIAL METHOD: ABNORMAL
EOSINOPHIL # BLD AUTO: 0.4 K/UL
EOSINOPHIL NFR BLD: 7.5 %
ERYTHROCYTE [DISTWIDTH] IN BLOOD BY AUTOMATED COUNT: 17.2 %
EST. GFR  (AFRICAN AMERICAN): 50.8 ML/MIN/1.73 M^2
EST. GFR  (NON AFRICAN AMERICAN): 43.9 ML/MIN/1.73 M^2
GLUCOSE SERPL-MCNC: 115 MG/DL
HCT VFR BLD AUTO: 25.7 %
HGB BLD-MCNC: 7.9 G/DL
IMM GRANULOCYTES # BLD AUTO: 0.02 K/UL
IMM GRANULOCYTES NFR BLD AUTO: 0.4 %
LYMPHOCYTES # BLD AUTO: 1 K/UL
LYMPHOCYTES NFR BLD: 17.3 %
MCH RBC QN AUTO: 29.5 PG
MCHC RBC AUTO-ENTMCNC: 30.7 G/DL
MCV RBC AUTO: 96 FL
MONOCYTES # BLD AUTO: 0.5 K/UL
MONOCYTES NFR BLD: 9.6 %
NEUTROPHILS # BLD AUTO: 3.6 K/UL
NEUTROPHILS NFR BLD: 64.5 %
NRBC BLD-RTO: 0 /100 WBC
PLATELET # BLD AUTO: 206 K/UL
PMV BLD AUTO: 11 FL
POTASSIUM SERPL-SCNC: 4.6 MMOL/L
PROT SERPL-MCNC: 6.5 G/DL
RBC # BLD AUTO: 2.68 M/UL
SODIUM SERPL-SCNC: 142 MMOL/L
WBC # BLD AUTO: 5.61 K/UL

## 2018-04-18 PROCEDURE — 99203 OFFICE O/P NEW LOW 30 MIN: CPT | Mod: S$GLB,,, | Performed by: INTERNAL MEDICINE

## 2018-04-18 PROCEDURE — 99499 UNLISTED E&M SERVICE: CPT | Mod: S$GLB,,, | Performed by: INTERNAL MEDICINE

## 2018-04-18 PROCEDURE — 80053 COMPREHEN METABOLIC PANEL: CPT

## 2018-04-18 PROCEDURE — 36415 COLL VENOUS BLD VENIPUNCTURE: CPT

## 2018-04-18 PROCEDURE — 83880 ASSAY OF NATRIURETIC PEPTIDE: CPT

## 2018-04-18 PROCEDURE — 85025 COMPLETE CBC W/AUTO DIFF WBC: CPT

## 2018-04-18 PROCEDURE — 99999 PR PBB SHADOW E&M-EST. PATIENT-LVL III: CPT | Mod: PBBFAC,,, | Performed by: INTERNAL MEDICINE

## 2018-04-18 PROCEDURE — 3077F SYST BP >= 140 MM HG: CPT | Mod: CPTII,S$GLB,, | Performed by: INTERNAL MEDICINE

## 2018-04-18 PROCEDURE — 3078F DIAST BP <80 MM HG: CPT | Mod: CPTII,S$GLB,, | Performed by: INTERNAL MEDICINE

## 2018-04-18 NOTE — PROGRESS NOTES
Subjective:      Patient ID: Rodolfo Messina is a 67 y.o. male.    Chief Complaint: Heart Transplant Pre-evaluation      HPI:  Rodolfo Messina is a 67 y.o. male who presents as as hospital follow up for infective endocarditis. He has severe MR with a healed vegetation and a prolapsed tricuspid valve with moderate regurgitation. He had originally been admitted for symptomatic anemia. His evaluation included upper and lower endoscopy with no active bleeding identified. During his admission he received transfusions of RBC and IV fluid resuscitation at the thought at the time was he was hypovolemic from bowel preparation and possible pneumonia with sepsis. After this he became hypoxemic requiring comfort flow oxygen. An echocardiogram was performed. 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 He was discharged on IV ampicillin and IV ceftriaxone. He was also seen in GI clinic; VCE was recommended to assess normocytic anemia. He wishes to wait to do this for several weeks as he recovers from his hospital admission.     TTE 04/16/18:   1 - Eccentric hypertrophy.     2 - Normal left ventricular systolic function (EF 60-65%).     3 - Right ventricle is upper limit of normal in size with normal systolic function.     4 - Biatrial enlargement.     5 - The tricuspid valve has prolapse of the anterior leaflet with moderate regurgitation.  A mass associated with it cannot be entirely excluded (see clip #24), but the echodensity seen on what appears to be the atrial aspect of the valve is likely   prolapsing leaflet.     6 - Mild aortic regurgitation.     7 - The mitral valve is thickened with evidence of what appears to be old/healed vegetation.  There is severe mitral regurgitation.     8 - Pulmonary hypertension. The estimated PA systolic pressure is 85 mmHg.     9 - Intermediate central venous pressure.     Review of Systems   Constitutional: Negative for activity change, appetite change, fatigue and fever.   HENT:  Negative for congestion, dental problem, sneezing and sore throat.    Eyes: Negative for pain, discharge and visual disturbance.   Respiratory: Positive for cough and shortness of breath. Negative for wheezing.    Cardiovascular: Positive for leg swelling. Negative for chest pain and palpitations.   Gastrointestinal: Negative for abdominal distention, abdominal pain, constipation, diarrhea, nausea and vomiting.   Endocrine: Negative for polydipsia, polyphagia and polyuria.   Genitourinary: Negative for dysuria, frequency and urgency.   Musculoskeletal: Negative for back pain and gait problem.   Skin: Negative for rash and wound.   Neurological: Negative for dizziness, seizures, syncope and headaches.   Hematological: Does not bruise/bleed easily.   Psychiatric/Behavioral: Negative for agitation and confusion. The patient is not nervous/anxious.      Objective:   Physical Exam   Constitutional: He is oriented to person, place, and time. He appears well-developed and well-nourished.   HENT:   Head: Normocephalic and atraumatic.   Eyes: Pupils are equal, round, and reactive to light.   Neck: Normal range of motion. Neck supple.   Cardiovascular: Normal rate and regular rhythm.    Murmur heard.  Pulmonary/Chest: Effort normal and breath sounds normal.   Abdominal: Soft. Bowel sounds are normal.   Musculoskeletal: Normal range of motion. He exhibits edema.   Neurological: He is alert and oriented to person, place, and time.   Skin: Skin is warm and dry.   Psychiatric: He has a normal mood and affect. His behavior is normal.   Nursing note and vitals reviewed.      Diagnotic Results:    2D ECHO 4/16/18  1 - Eccentric hypertrophy.     2 - Normal left ventricular systolic function (EF 60-65%).     3 - Right ventricle is upper limit of normal in size with normal systolic function.     4 - Biatrial enlargement.     5 - The tricuspid valve has prolapse of the anterior leaflet with moderate regurgitation.  A mass associated  with it cannot be entirely excluded (see clip #24), but the echodensity seen on what appears to be the atrial aspect of the valve is likely   prolapsing leaflet.     6 - Mild aortic regurgitation.     7 - The mitral valve is thickened with evidence of what appears to be old/healed vegetation.  There is severe mitral regurgitation.     8 - Pulmonary hypertension. The estimated PA systolic pressure is 85 mmHg.     9 - Intermediate central venous pressure.    SOWMYA 2/27/18    1 - No visualized thrombus in the left atrial appendage.     2 - Left atrium is enlarged.     3 - Normal left ventricular systolic function (EF 65-70%).     4 - Moderate to severe aortic regurgitation.     5 - Severe mitral regurgitation.     6 - Echodensity visualized on the aortic valve consistent with vegetation ( see text).     7 - Echodensity visualized on the mitral valve consistent with vegetation ( see text).     8 - Grade 2 atheroma disease of aorta.     Assessment:   Rodolfo Messina is a 67 y.o. male who presents as as hospital follow up for infective endocarditis. He has severe MR with a healed vegetation and a prolapsed tricuspid valve with moderate regurgitation.   Plan:     Will discuss with Dr. Go starting afterload reduction, however uncertain of timing of surgery planned. Will be difficult to improve PA pressures/cough without afterload reduction and diuresis, it may be best to bring patient into the hospital a few days before surgery to optimize his volume status and medical regimen.   Discussed at length with patient and wife.   Recommend 2 gram sodium restriction and 1500cc fluid restriction.  Encourage physical activity with graded exercise program.  Requested patient to weigh themselves daily, and to notify us if their weight increases by more than 3 lbs in 1 day or 5 lbs in 1 week.  RTC after I speak with Dr. Go.

## 2018-04-22 PROBLEM — I33.0 SUBACUTE INFECTIVE ENDOCARDITIS: Status: RESOLVED | Noted: 2018-02-26 | Resolved: 2018-04-22

## 2018-04-23 ENCOUNTER — HOSPITAL ENCOUNTER (OUTPATIENT)
Dept: VASCULAR SURGERY | Facility: CLINIC | Age: 68
Discharge: HOME | End: 2018-04-23
Attending: THORACIC SURGERY (CARDIOTHORACIC VASCULAR SURGERY)
Payer: MEDICARE

## 2018-04-23 ENCOUNTER — INITIAL CONSULT (OUTPATIENT)
Dept: CARDIOLOGY | Facility: CLINIC | Age: 68
End: 2018-04-23
Payer: MEDICARE

## 2018-04-23 ENCOUNTER — DOCUMENTATION ONLY (OUTPATIENT)
Dept: CARDIOLOGY | Facility: CLINIC | Age: 68
End: 2018-04-23

## 2018-04-23 VITALS
DIASTOLIC BLOOD PRESSURE: 72 MMHG | HEART RATE: 88 BPM | SYSTOLIC BLOOD PRESSURE: 149 MMHG | BODY MASS INDEX: 22.01 KG/M2 | WEIGHT: 157.19 LBS | OXYGEN SATURATION: 98 % | HEIGHT: 71 IN

## 2018-04-23 DIAGNOSIS — I05.9 ENDOCARDITIS OF MITRAL VALVE: ICD-10-CM

## 2018-04-23 DIAGNOSIS — Z01.818 PRE-OP EVALUATION: ICD-10-CM

## 2018-04-23 DIAGNOSIS — I35.1 NONRHEUMATIC AORTIC VALVE INSUFFICIENCY: ICD-10-CM

## 2018-04-23 DIAGNOSIS — I34.0 NON-RHEUMATIC MITRAL REGURGITATION: Primary | ICD-10-CM

## 2018-04-23 DIAGNOSIS — I10 ESSENTIAL HYPERTENSION: Chronic | ICD-10-CM

## 2018-04-23 DIAGNOSIS — I33.0 SUBACUTE BACTERIAL ENDOCARDITIS: ICD-10-CM

## 2018-04-23 DIAGNOSIS — I38 ENDOCARDITIS, UNSPECIFIED CHRONICITY, UNSPECIFIED ENDOCARDITIS TYPE: ICD-10-CM

## 2018-04-23 PROCEDURE — 3077F SYST BP >= 140 MM HG: CPT | Mod: CPTII,S$GLB,, | Performed by: INTERNAL MEDICINE

## 2018-04-23 PROCEDURE — 99214 OFFICE O/P EST MOD 30 MIN: CPT | Mod: S$GLB,,, | Performed by: INTERNAL MEDICINE

## 2018-04-23 PROCEDURE — 3078F DIAST BP <80 MM HG: CPT | Mod: CPTII,S$GLB,, | Performed by: INTERNAL MEDICINE

## 2018-04-23 PROCEDURE — 99999 PR PBB SHADOW E&M-EST. PATIENT-LVL IV: CPT | Mod: PBBFAC,,, | Performed by: INTERNAL MEDICINE

## 2018-04-23 PROCEDURE — 93880 EXTRACRANIAL BILAT STUDY: CPT | Mod: S$GLB,,, | Performed by: SURGERY

## 2018-04-23 PROCEDURE — 99499 UNLISTED E&M SERVICE: CPT | Mod: S$GLB,,, | Performed by: INTERNAL MEDICINE

## 2018-04-23 RX ORDER — SODIUM CHLORIDE 9 MG/ML
3 INJECTION, SOLUTION INTRAVENOUS CONTINUOUS
Status: CANCELLED | OUTPATIENT
Start: 2018-04-23 | End: 2018-04-23

## 2018-04-23 RX ORDER — DIPHENHYDRAMINE HCL 25 MG
50 CAPSULE ORAL ONCE
Status: CANCELLED | OUTPATIENT
Start: 2018-04-23 | End: 2018-04-23

## 2018-04-23 NOTE — PROGRESS NOTES
OUTPATIENT CATHETERIZATION INSTRUCTIONS    You have been scheduled for a procedure in the catheterization lab on Tuesday, May 8,  2018.     Please report to the Cardiology Waiting Area on the Third floor of the hospital and check in at 9 AM.   You will then be taken to the SSCU (Short Stay Cardiac Unit) and prepared for your procedure. Please be aware that this is not the time of your procedure but the time you are to arrive. The procedures are scheduled on an hourly basis; however, emergency cases take precedence over all other cases.       You may not have anything to eat or drink after midnight the night before your test. You may take your regular morning medications with water. If there are any medications that you should not take you will be instructed to hold them that morning. If you are diabetic and on Metformin (Glucophage) do not take it the day before, the day of, and for 2 days after your procedure.      The procedure will take 1-2 hours to perform. After the procedure, you will return to SSCU on the third floor of the hospital. You will need to lie still (or keep your arm still) for the next 4 to 6 hours to minimize bleeding from the puncture site. Your family may remain in the room with you during this time.       You may be able to be discharged home that same afternoon if there is someone to drive you home and there were no complications. If you have one of the balloon, stent, or device procedures you may spend the night in the hospital. Your doctor will determine, based on your progress, the date and time of your discharge. The results of your procedure will be discussed with you before you are discharged. Any further testing or procedures will be scheduled for you either before you leave or you will be called with these appointments.       If you should have any questions, concerns, or need to change the date of your procedure, please call MIRANDA Nolasco @ [phone]    Special  Instructions:    Drink plenty of water the day before your procedure.        THE ABOVE INSTRUCTIONS WERE GIVEN TO THE PATIENT VERBALLY AND THEY VERBALIZED UNDERSTANDING.  THEY DO NOT REQUIRE ANY SPECIAL NEEDS AND DO NOT HAVE ANY LEARNING BARRIERS.          Directions for Reporting to Cardiology Waiting Area in the Hospital  If you park in the Parking Garage:  Take elevators to the1st floor of the parking garage.  Continue past the gift shop, coffee shop, and piano.  Take a right and go to the gold elevators. (Elevator B)  Take the elevator to the 3rd floor.  Follow the arrow on the sign on the wall that says Cath Lab Registration/EP Lab Registration.  Follow the long hallway all the way around until you come to a big open area.  This is the registration area.  Check in at Reception Desk.    OR    If family is dropping you off:  Have them drop you off at the front of the Hospital under the green overhang.  Enter through the doors and take a right.  Take the E elevators to the 3rd floor Cardiology Waiting Area.  Check in at the Reception Desk in the waiting room.

## 2018-04-23 NOTE — LETTER
April 24, 2018      Marvin Go MD  [address]  [facility] [address]           [facility]  [address]  [facility] [address]  Phone: [phone]          Patient: Rodolfo Messina   MR Number: [id number]   YOB: 1950   Date of Visit: 4/23/2018       Dear Dr. Marvin Go:    Thank you for referring Rodolfo Messina to me for evaluation. Attached you will find relevant portions of my assessment and plan of care.    If you have questions, please do not hesitate to call me. I look forward to following Rodolfo Messina along with you.    Sincerely,    Oliver Borrego MD    Enclosure  CC:  No Recipients    If you would like to receive this communication electronically, please contact [email or web address] or [phone] to request more information on [employer] access.    For providers and/or their staff who would like to refer a patient to [facility], please contact us through our one-stop-shop provider referral line, [facility] , at [phone].    If you feel you have received this communication in error or would no longer like to receive these types of communications, please e-mail [email or web address]

## 2018-04-24 PROBLEM — J96.01 ACUTE HYPOXEMIC RESPIRATORY FAILURE: Status: RESOLVED | Noted: 2018-02-24 | Resolved: 2018-04-24

## 2018-04-24 NOTE — PROGRESS NOTES
Subjective:    Patient ID:  Rodolfo Messina is a 67 y.o. male who presents for evaluation of Valvular Heart Disease    Referring physician: Dr. Abbi PASTOR  Mrs. Messina is a 56 y/o gentleman with a h/o mitral valve endocarditis. He ws left wih severe MR along with TR and pulmonary HTN.  The patient denies angina.  He denies exertional dyspnea, but reports he is sedentary.  He repots 1+ bilateral LE edema, but denies orthopnea or PND.  He denies palpitations, syncope, or near sycnope. The patient report that the etiology of his endocarditis remains unknown; however he is under the impression that it may be related to a urologic procedure. The pateint denie current faver/ chills.  He reports a poor apetite, but reports good medication compliance.    Past Medical History:   Diagnosis Date    ANNA (acute kidney injury) 2/22/2018    Anemia     Anxiety     BPH (benign prostatic hyperplasia)     Diverticulosis     Hypertension     Urinary retention      Past Surgical History:   Procedure Laterality Date    APPENDECTOMY      COLONOSCOPY      COLONOSCOPY N/A 2/1/2018    Procedure: COLONOSCOPY;  Surgeon: Richar Payan MD;  Location: [address];  Service: Endoscopy;  Laterality: N/A;  PM prep    EYE SURGERY Right     cataract extraction    FINGER SURGERY      FRACTURE SURGERY Right     index finger    TONSILLECTOMY       Med  Review of patient's allergies indicates:   Allergen Reactions    Augmentin [amoxicillin-pot clavulanate]      Patient felt that it raised BP and requested to have it added as intolerance. Tolerated unasyn    Ciprofloxacin     Flagyl [metronidazole]     Lisinopril Other (See Comments)     cough    Mysoline [primidone]     Omnicef [cefdinir]     Shellfish containing products      Social History   Substance Use Topics    Smoking status: Never Smoker    Smokeless tobacco: Never Used    Alcohol use No      Comment: none recently     Family History   Problem Relation Age  "of Onset    Stroke Mother     Heart disease Mother      CABG    Heart disease Father      CABG    No Known Problems Sister     No Known Problems Brother        Review of Systems   Constitution: Negative for decreased appetite, diaphoresis, fever, malaise/fatigue, weight gain and weight loss.   HENT: Negative for congestion, nosebleeds and sore throat.    Eyes: Negative for blurred vision, vision loss in left eye, vision loss in right eye and visual disturbance.   Cardiovascular: Negative for chest pain, claudication, dyspnea on exertion, leg swelling, near-syncope, orthopnea, palpitations, paroxysmal nocturnal dyspnea and syncope.   Respiratory: Negative for cough, hemoptysis, shortness of breath and wheezing.    Endocrine: Negative for polyuria.   Hematologic/Lymphatic: Does not bruise/bleed easily.   Skin: Negative for nail changes and rash.   Musculoskeletal: Negative for back pain, muscle cramps and myalgias.   Gastrointestinal: Negative for abdominal pain, change in bowel habit, diarrhea, heartburn, hematemesis, hematochezia, melena, nausea and vomiting.   Genitourinary: Negative for bladder incontinence, dysuria, frequency and hematuria.   Psychiatric/Behavioral: Negative for depression.   Allergic/Immunologic: Negative for hives.        Objective:  Vitals:    04/23/18 1439 04/23/18 1442   BP: (!) 155/72 (!) 149/72   BP Location: Left arm Right arm   Patient Position: Sitting Sitting   BP Method: Large (Automatic) Large (Automatic)   Pulse: 88 88   SpO2: 98%    Weight: 71.3 kg (157 lb 3 oz)    Height: 5' 10.5" (1.791 m)    ;    Physical Exam   Constitutional: He is oriented to person, place, and time. He appears well-developed and well-nourished.   HENT:   Head: Normocephalic and atraumatic.   Eyes: EOM are normal. Pupils are equal, round, and reactive to light.   Neck: Neck supple. No JVD present. No thyromegaly present.   Cardiovascular: Normal rate and regular rhythm.  PMI is displaced.  Exam reveals " no gallop and no friction rub.    No murmur heard.  Pulses:       Carotid pulses are 2+ on the right side, and 2+ on the left side.       Radial pulses are 2+ on the right side, and 2+ on the left side.        Femoral pulses are 2+ on the right side, and 2+ on the left side.       Dorsalis pedis pulses are 2+ on the right side, and 2+ on the left side.        Posterior tibial pulses are 2+ on the right side, and 2+ on the left side.   Normal right radial Avni's test.   Pulmonary/Chest: Effort normal. He has no wheezes. He has no rhonchi. He has no rales.   Abdominal: Soft. Normal appearance. He exhibits no distension. There is no hepatosplenomegaly. There is no tenderness.   Neurological: He is alert and oriented to person, place, and time. Gait normal.   Psychiatric: He has a normal mood and affect.         Assessment:       1. Non-rheumatic mitral regurgitation    2. Endocarditis of mitral valve    3. Essential hypertension    4. Subacute bacterial endocarditis         Plan:       1) Severe MR. Will plan on diagnostic cardiac cath as part of pre-mitral valve repair/ replacement sugrery;  5Fr R Radial access  The risks, benefits, and alternatives of cardiac catheterization and possible intervention were discussed with the patient.  All questions were answered and informed consent was obtained.    2) HTN. Will reassess blood pressure post cath; given elevated blood pressure and severe MR, will assess patietn for afteroad reducing agent post cath; ACE-I and ARB contraindcated as patient reports swellng with this medications;    3)  Dyslipidemia. Lipid panels from 5939-8351 reviewed if patient is found to have CAD, then will rec statin therpay;    4) CKD3 creatinine was 1.6 on 4/18/18; will reassess on day of cath; rec oral hydration on the day prior to cath; IV hydration onth day of cath; check Cr on day of cardiac cath prior to cath    5) Anemia. Normocytic on 4/18/18; likely anemia of chronic disease    6)  Malnutrition. Albumin on 4/18/18 was 3.1; rec heart healthy diet; rec supplementing diet with 2 bottles of Boost each day    All of the patient's questions were answered.

## 2018-04-26 ENCOUNTER — TELEPHONE (OUTPATIENT)
Dept: FAMILY MEDICINE | Facility: CLINIC | Age: 68
End: 2018-04-26

## 2018-04-26 RX ORDER — MIRTAZAPINE 30 MG/1
30 TABLET, FILM COATED ORAL NIGHTLY
Qty: 90 TABLET | Refills: 3 | Status: SHIPPED | OUTPATIENT
Start: 2018-04-26 | End: 2019-05-23 | Stop reason: SDUPTHER

## 2018-04-26 NOTE — TELEPHONE ENCOUNTER
----- Message from Denice Addison sent at 4/26/2018  2:12 PM CDT -----  Contact: Mrs Messina / [phone]  Patient is requesting a medication refill.     RX name: mirtazapine  Strength: 30mg  Quantity: 30 pills  Directions: Take 1 tablet (30 mg total) by mouth every evening    Pharmacy name: [facility]      Phone number where patient can be reached: [phone]    ALSO, he is going to get his angiogram on 5-8-18.  Had the neck done and there was no blockage.

## 2018-05-08 ENCOUNTER — HOSPITAL ENCOUNTER (OUTPATIENT)
Facility: HOSPITAL | Age: 68
Discharge: HOME | End: 2018-05-08
Attending: INTERNAL MEDICINE | Admitting: INTERNAL MEDICINE
Payer: MEDICARE

## 2018-05-08 VITALS
WEIGHT: 154 LBS | HEART RATE: 87 BPM | SYSTOLIC BLOOD PRESSURE: 151 MMHG | HEIGHT: 70 IN | DIASTOLIC BLOOD PRESSURE: 70 MMHG | BODY MASS INDEX: 22.05 KG/M2 | OXYGEN SATURATION: 96 % | TEMPERATURE: 97 F | RESPIRATION RATE: 18 BRPM

## 2018-05-08 DIAGNOSIS — I34.0 NON-RHEUMATIC MITRAL REGURGITATION: ICD-10-CM

## 2018-05-08 DIAGNOSIS — R25.1 TREMOR: ICD-10-CM

## 2018-05-08 LAB
ABO + RH BLD: NORMAL
ANION GAP SERPL CALC-SCNC: 8 MMOL/L
BASOPHILS # BLD AUTO: 0.03 K/UL
BASOPHILS NFR BLD: 0.7 %
BLD GP AB SCN CELLS X3 SERPL QL: NORMAL
BUN SERPL-MCNC: 33 MG/DL
CALCIUM SERPL-MCNC: 9.4 MG/DL
CHLORIDE SERPL-SCNC: 111 MMOL/L
CO2 SERPL-SCNC: 24 MMOL/L
CORONARY STENOSIS: ABNORMAL
CREAT SERPL-MCNC: 1.4 MG/DL
DIFFERENTIAL METHOD: ABNORMAL
EOSINOPHIL # BLD AUTO: 0.3 K/UL
EOSINOPHIL NFR BLD: 6.5 %
ERYTHROCYTE [DISTWIDTH] IN BLOOD BY AUTOMATED COUNT: 15.1 %
EST. GFR  (AFRICAN AMERICAN): 59.7 ML/MIN/1.73 M^2
EST. GFR  (NON AFRICAN AMERICAN): 51.6 ML/MIN/1.73 M^2
GLUCOSE SERPL-MCNC: 86 MG/DL
HCT VFR BLD AUTO: 28.1 %
HGB BLD-MCNC: 9.2 G/DL
IMM GRANULOCYTES # BLD AUTO: 0.01 K/UL
IMM GRANULOCYTES NFR BLD AUTO: 0.2 %
LYMPHOCYTES # BLD AUTO: 0.9 K/UL
LYMPHOCYTES NFR BLD: 18.5 %
MCH RBC QN AUTO: 31.3 PG
MCHC RBC AUTO-ENTMCNC: 32.7 G/DL
MCV RBC AUTO: 96 FL
MONOCYTES # BLD AUTO: 0.4 K/UL
MONOCYTES NFR BLD: 7.8 %
NEUTROPHILS # BLD AUTO: 3.1 K/UL
NEUTROPHILS NFR BLD: 66.3 %
NRBC BLD-RTO: 0 /100 WBC
PLATELET # BLD AUTO: 153 K/UL
PMV BLD AUTO: 11.6 FL
POTASSIUM SERPL-SCNC: 4.3 MMOL/L
RBC # BLD AUTO: 2.94 M/UL
SODIUM SERPL-SCNC: 143 MMOL/L
WBC # BLD AUTO: 4.6 K/UL

## 2018-05-08 PROCEDURE — 25000003 PHARM REV CODE 250

## 2018-05-08 PROCEDURE — 99152 MOD SED SAME PHYS/QHP 5/>YRS: CPT | Mod: ,,, | Performed by: INTERNAL MEDICINE

## 2018-05-08 PROCEDURE — 80048 BASIC METABOLIC PNL TOTAL CA: CPT

## 2018-05-08 PROCEDURE — 93005 ELECTROCARDIOGRAM TRACING: CPT

## 2018-05-08 PROCEDURE — 25000003 PHARM REV CODE 250: Performed by: INTERNAL MEDICINE

## 2018-05-08 PROCEDURE — 63600175 PHARM REV CODE 636 W HCPCS

## 2018-05-08 PROCEDURE — 93458 L HRT ARTERY/VENTRICLE ANGIO: CPT | Mod: 26,,, | Performed by: INTERNAL MEDICINE

## 2018-05-08 PROCEDURE — 93571 IV DOP VEL&/PRESS C FLO 1ST: CPT | Mod: 26,LD,, | Performed by: INTERNAL MEDICINE

## 2018-05-08 PROCEDURE — 93010 ELECTROCARDIOGRAM REPORT: CPT | Mod: ,,, | Performed by: INTERNAL MEDICINE

## 2018-05-08 PROCEDURE — C1887 CATHETER, GUIDING: HCPCS

## 2018-05-08 PROCEDURE — 93458 L HRT ARTERY/VENTRICLE ANGIO: CPT

## 2018-05-08 PROCEDURE — 93010 ELECTROCARDIOGRAM REPORT: CPT | Mod: 76,,, | Performed by: INTERNAL MEDICINE

## 2018-05-08 PROCEDURE — 85025 COMPLETE CBC W/AUTO DIFF WBC: CPT

## 2018-05-08 PROCEDURE — 86850 RBC ANTIBODY SCREEN: CPT

## 2018-05-08 RX ORDER — ATORVASTATIN CALCIUM 20 MG/1
40 TABLET, FILM COATED ORAL DAILY
Qty: 180 TABLET | Refills: 3 | Status: ON HOLD | OUTPATIENT
Start: 2018-05-08 | End: 2018-06-20 | Stop reason: HOSPADM

## 2018-05-08 RX ORDER — DIPHENHYDRAMINE HCL 50 MG
50 CAPSULE ORAL ONCE
Status: COMPLETED | OUTPATIENT
Start: 2018-05-08 | End: 2018-05-08

## 2018-05-08 RX ORDER — ASPIRIN 81 MG/1
81 TABLET ORAL DAILY
Refills: 0 | Status: ON HOLD | COMMUNITY
Start: 2018-05-08 | End: 2018-06-20 | Stop reason: HOSPADM

## 2018-05-08 RX ORDER — ATORVASTATIN CALCIUM 20 MG/1
40 TABLET, FILM COATED ORAL DAILY
Qty: 180 TABLET | Refills: 3 | Status: SHIPPED | OUTPATIENT
Start: 2018-05-08 | End: 2018-05-08

## 2018-05-08 RX ORDER — SODIUM CHLORIDE 9 MG/ML
3 INJECTION, SOLUTION INTRAVENOUS CONTINUOUS
Status: ACTIVE | OUTPATIENT
Start: 2018-05-08 | End: 2018-05-08

## 2018-05-08 RX ORDER — ASPIRIN 81 MG/1
81 TABLET ORAL DAILY
Status: DISCONTINUED | OUTPATIENT
Start: 2018-05-08 | End: 2018-05-08

## 2018-05-08 RX ADMIN — SODIUM CHLORIDE 3 ML/KG/HR: 0.9 INJECTION, SOLUTION INTRAVENOUS at 09:05

## 2018-05-08 RX ADMIN — DIPHENHYDRAMINE HYDROCHLORIDE 50 MG: 50 CAPSULE ORAL at 09:05

## 2018-05-08 NOTE — PLAN OF CARE
Problem: Patient Care Overview  Goal: Plan of Care Review  Outcome: Ongoing (interventions implemented as appropriate)  Pt arrived to unit accompanied by spouse.  Vss.  Oriented pt to rm and unit.  Pre op orders and assessment initiated.  Pt remains npo.  Pt in no acute distress and verbalizes no complaints. Will continue to monitor. Call bell within reach. Will continue to monitor.

## 2018-05-08 NOTE — NURSING
Pt d/c'd to home via w/c accompanied by spouse.  Vss. r radial site remains cdi.  0 bleed, 0hematoma.  Instructed pt and spouse on home medications, post procedure precautions and follow up visits.  Pt and spouse verbalizes no complaints.  piv removed intact and without difficulty.  Telemetry monitor removed.  Pt in no acute distress and verbalizes no complaints.

## 2018-05-08 NOTE — H&P (VIEW-ONLY)
Subjective:    Patient ID:  Rodolfo Messina is a 67 y.o. male who presents for evaluation of Valvular Heart Disease    Referring physician: Dr. Abbi PASTOR  Mrs. Messina is a 58 y/o gentleman with a h/o mitral valve endocarditis. He ws left wih severe MR along with TR and pulmonary HTN.  The patient denies angina.  He denies exertional dyspnea, but reports he is sedentary.  He repots 1+ bilateral LE edema, but denies orthopnea or PND.  He denies palpitations, syncope, or near sycnope. The patient report that the etiology of his endocarditis remains unknown; however he is under the impression that it may be related to a urologic procedure. The pateint denie current faver/ chills.  He reports a poor apetite, but reports good medication compliance.    Past Medical History:   Diagnosis Date    ANNA (acute kidney injury) 2/22/2018    Anemia     Anxiety     BPH (benign prostatic hyperplasia)     Diverticulosis     Hypertension     Urinary retention      Past Surgical History:   Procedure Laterality Date    APPENDECTOMY      COLONOSCOPY      COLONOSCOPY N/A 2/1/2018    Procedure: COLONOSCOPY;  Surgeon: Richar Payan MD;  Location: [address];  Service: Endoscopy;  Laterality: N/A;  PM prep    EYE SURGERY Right     cataract extraction    FINGER SURGERY      FRACTURE SURGERY Right     index finger    TONSILLECTOMY       Med  Review of patient's allergies indicates:   Allergen Reactions    Augmentin [amoxicillin-pot clavulanate]      Patient felt that it raised BP and requested to have it added as intolerance. Tolerated unasyn    Ciprofloxacin     Flagyl [metronidazole]     Lisinopril Other (See Comments)     cough    Mysoline [primidone]     Omnicef [cefdinir]     Shellfish containing products      Social History   Substance Use Topics    Smoking status: Never Smoker    Smokeless tobacco: Never Used    Alcohol use No      Comment: none recently     Family History   Problem Relation Age  "of Onset    Stroke Mother     Heart disease Mother      CABG    Heart disease Father      CABG    No Known Problems Sister     No Known Problems Brother        Review of Systems   Constitution: Negative for decreased appetite, diaphoresis, fever, malaise/fatigue, weight gain and weight loss.   HENT: Negative for congestion, nosebleeds and sore throat.    Eyes: Negative for blurred vision, vision loss in left eye, vision loss in right eye and visual disturbance.   Cardiovascular: Negative for chest pain, claudication, dyspnea on exertion, leg swelling, near-syncope, orthopnea, palpitations, paroxysmal nocturnal dyspnea and syncope.   Respiratory: Negative for cough, hemoptysis, shortness of breath and wheezing.    Endocrine: Negative for polyuria.   Hematologic/Lymphatic: Does not bruise/bleed easily.   Skin: Negative for nail changes and rash.   Musculoskeletal: Negative for back pain, muscle cramps and myalgias.   Gastrointestinal: Negative for abdominal pain, change in bowel habit, diarrhea, heartburn, hematemesis, hematochezia, melena, nausea and vomiting.   Genitourinary: Negative for bladder incontinence, dysuria, frequency and hematuria.   Psychiatric/Behavioral: Negative for depression.   Allergic/Immunologic: Negative for hives.        Objective:  Vitals:    04/23/18 1439 04/23/18 1442   BP: (!) 155/72 (!) 149/72   BP Location: Left arm Right arm   Patient Position: Sitting Sitting   BP Method: Large (Automatic) Large (Automatic)   Pulse: 88 88   SpO2: 98%    Weight: 71.3 kg (157 lb 3 oz)    Height: 5' 10.5" (1.791 m)    ;    Physical Exam   Constitutional: He is oriented to person, place, and time. He appears well-developed and well-nourished.   HENT:   Head: Normocephalic and atraumatic.   Eyes: EOM are normal. Pupils are equal, round, and reactive to light.   Neck: Neck supple. No JVD present. No thyromegaly present.   Cardiovascular: Normal rate and regular rhythm.  PMI is displaced.  Exam reveals " no gallop and no friction rub.    No murmur heard.  Pulses:       Carotid pulses are 2+ on the right side, and 2+ on the left side.       Radial pulses are 2+ on the right side, and 2+ on the left side.        Femoral pulses are 2+ on the right side, and 2+ on the left side.       Dorsalis pedis pulses are 2+ on the right side, and 2+ on the left side.        Posterior tibial pulses are 2+ on the right side, and 2+ on the left side.   Normal right radial Avni's test.   Pulmonary/Chest: Effort normal. He has no wheezes. He has no rhonchi. He has no rales.   Abdominal: Soft. Normal appearance. He exhibits no distension. There is no hepatosplenomegaly. There is no tenderness.   Neurological: He is alert and oriented to person, place, and time. Gait normal.   Psychiatric: He has a normal mood and affect.         Assessment:       1. Non-rheumatic mitral regurgitation    2. Endocarditis of mitral valve    3. Essential hypertension    4. Subacute bacterial endocarditis         Plan:       1) Severe MR. Will plan on diagnostic cardiac cath as part of pre-mitral valve repair/ replacement sugrery;  5Fr R Radial access  The risks, benefits, and alternatives of cardiac catheterization and possible intervention were discussed with the patient.  All questions were answered and informed consent was obtained.    2) HTN. Will reassess blood pressure post cath; given elevated blood pressure and severe MR, will assess patietn for afteroad reducing agent post cath; ACE-I and ARB contraindcated as patient reports swellng with this medications;    3)  Dyslipidemia. Lipid panels from [id number] reviewed if patient is found to have CAD, then will rec statin therpay;    4) CKD3 creatinine was 1.6 on 4/18/18; will reassess on day of cath; rec oral hydration on the day prior to cath; IV hydration onth day of cath; check Cr on day of cardiac cath prior to cath    5) Anemia. Normocytic on 4/18/18; likely anemia of chronic disease    6)  Malnutrition. Albumin on 4/18/18 was 3.1; rec heart healthy diet; rec supplementing diet with 2 bottles of Boost each day    All of the patient's questions were answered.

## 2018-05-08 NOTE — DISCHARGE SUMMARY
Discharge Summary  Interventional Cardiology      Admit Date: 5/8/2018    Discharge Date:  5/8/2018    Attending Physician: Oliver Borrego MD    Discharge Physician: Karyn Quintero MD    Principal Diagnoses: severe MR, CAD  Indication for Admission: [facility]    Discharged Condition: Good    Hospital Course:   Patient presented for outpatient [facility] which went without complication. LHC was notable for mLAD lesion. IFR 0.50 deeming it to be hemodynamically significant. No complications during or post-procedure. LVEDP 12. Separate ostia noted for LAD and LCx.    Outpatient Plan:  - Follow-up appointment in 2 weeks with Dr. Go  - Medication changes/ additions include: starting ASA 81 mg PO QD and atorvastatin 40 mg PO QD    Diet: Cardiac diet    Activity: Ad luz, wound care instructions provided     Discharge Instructions and Care of Your Wrist After a Cardiac Catheterization Procedure Performed via the Radial Artery    For 24 Hours following the procedure:   Do not subject your affected hand/arm to any forceful movements (i.e. supporting weight when rising from a chair or bed)   Do not drive a car for 24 hours   The dressing (band-aid) on the puncture site may be removed after 24 hours and left open to air.  If there is minor oozing, you may apply another Band-aid and remove after 12 hours   You may shower on the day following your procedure.  Do not take a tub bath or submerge the puncture site in water for 3 days following the procedure    For 48 hours following the procedure:    Do not lift anything heavier than 3 to 5 pounds with the affected hand/arm   Do not operate a lawnmower, motorcycle, chainsaw, or all-terrain vehicle    Avoid excessive (extension/flexion) wrist movement (i.e. supporting weight when rising from a chair or bed, push-ups, lifting garage doors, etc.)   Do not engage in vigorous exercise (i.e. Tennis, Golf, Bowling) using the affected arm    If bleeding should occur following  discharge:   Sit down and apply firm pressure to the puncture site with your fingers for 10 minutes    If the bleeding stops, continue to sit quietly, keeping your wrist straight for 2 hours. Notify your physician as soon as possible    If bleeding does not stop after 10 minutes or if there is a large amount of bleeding or spurting, call 911 immediately.  Do not drive yourself to the hospital.     You should expect mild tingling in your hand and tenderness at the puncture site for up to 3 days.     Notify your physician if these symptoms persist or if you experience:   Change in color or temperature of the hand or arm   Redness, heat, or pus at the puncture site   Chills or fever greater than 101.0 F        Disposition: Home or Self Care    Discharge Medications:      Medication List      START taking these medications    aspirin 81 MG EC tablet  Commonly known as:  ECOTRIN  Take 1 tablet (81 mg total) by mouth once daily.     atorvastatin 20 MG tablet  Commonly known as:  LIPITOR  Take 2 tablets (40 mg total) by mouth once daily.        CONTINUE taking these medications    albuterol-ipratropium 2.5mg-0.5mg/3mL 0.5 mg-3 mg(2.5 mg base)/3 mL nebulizer solution  Commonly known as:  DUO-NEB     benzonatate 100 MG capsule  Commonly known as:  TESSALON     furosemide 40 MG tablet  Commonly known as:  LASIX  Take 1 tablet (40 mg total) by mouth once daily.     loratadine 10 mg tablet  Commonly known as:  CLARITIN     mirtazapine 30 MG tablet  Commonly known as:  REMERON  Take 1 tablet (30 mg total) by mouth every evening.     pantoprazole 40 MG tablet  Commonly known as:  PROTONIX  Take 1 tablet (40 mg total) by mouth once daily.     tamsulosin 0.4 mg Cp24  Commonly known as:  FLOMAX  Take 2 capsules (0.8 mg total) by mouth 2 (two) times daily.     VENTOLIN HFA 90 mcg/actuation inhaler  Generic drug:  albuterol           Where to Get Your Medications      These medications [facility] to [id number]   [facility] [address]  [address] [place] [address]    Phone:  [phone]   · atorvastatin 20 MG tablet     You can get these medications from any pharmacy    You don't need a prescription for these medications  · aspirin 81 MG EC tablet       Follow Up:  Follow-up Information     Marvin Go MD In 2 weeks.    Specialties:  Cardiothoracic Surgery, Transplant  Why:  severe MR. mLAD lesion - IFR significant  Contact information:  [address] [place] [address] [id number] [phone]                   Signed:  Karyn Quintero MD  Cardiology Fellow  Pager: [phone]  5/8/2018 1:46 PM

## 2018-05-08 NOTE — INTERVAL H&P NOTE
The patient has been examined and the H&P has been reviewed:    I concur with the findings and no changes have occurred since H&P was written.    Anesthesia/Surgery risks, benefits and alternative options discussed and understood by patient/family.          Active Hospital Problems    Diagnosis  POA    Non-rheumatic mitral regurgitation [I34.0]  Yes      Resolved Hospital Problems    Diagnosis Date Resolved POA   No resolved problems to display.

## 2018-05-09 DIAGNOSIS — I08.0 MITRAL AND AORTIC VALVE REGURGITATION: Primary | ICD-10-CM

## 2018-05-10 DIAGNOSIS — I08.0 MITRAL AND AORTIC VALVE REGURGITATION: Primary | ICD-10-CM

## 2018-05-10 DIAGNOSIS — R73.9 HYPERGLYCEMIA: ICD-10-CM

## 2018-05-10 DIAGNOSIS — I05.9 ENDOCARDITIS OF MITRAL VALVE: ICD-10-CM

## 2018-05-10 DIAGNOSIS — I27.22 PULMONARY HYPERTENSION DUE TO MITRAL VALVE DISEASE: ICD-10-CM

## 2018-05-10 DIAGNOSIS — I05.9 PULMONARY HYPERTENSION DUE TO MITRAL VALVE DISEASE: ICD-10-CM

## 2018-05-10 DIAGNOSIS — I10 ESSENTIAL HYPERTENSION: ICD-10-CM

## 2018-05-10 DIAGNOSIS — Z01.810 PREOP CARDIOVASCULAR EXAM: ICD-10-CM

## 2018-05-10 DIAGNOSIS — I99.8 OTHER DISORDER OF CIRCULATORY SYSTEM: ICD-10-CM

## 2018-05-18 ENCOUNTER — TELEPHONE (OUTPATIENT)
Dept: CARDIOTHORACIC SURGERY | Facility: CLINIC | Age: 68
End: 2018-05-18

## 2018-05-18 NOTE — TELEPHONE ENCOUNTER
Called and informed pt's wife that Dr. Go would like to admit the pt on May 23 and perform his surgery on May 25.  Pt's wife informed of location to report on May 23 for his admit.  Pt's wife verbalized understanding.

## 2018-05-21 ENCOUNTER — DOCUMENTATION ONLY (OUTPATIENT)
Dept: CARDIOTHORACIC SURGERY | Facility: CLINIC | Age: 68
End: 2018-05-21

## 2018-05-21 PROBLEM — I25.118 CORONARY ARTERY DISEASE OF NATIVE ARTERY OF NATIVE HEART WITH STABLE ANGINA PECTORIS: Status: ACTIVE | Noted: 2018-05-21

## 2018-05-22 PROBLEM — I25.118 CORONARY ARTERY DISEASE OF NATIVE ARTERY OF NATIVE HEART WITH STABLE ANGINA PECTORIS: Status: ACTIVE | Noted: 2018-05-22

## 2018-05-23 ENCOUNTER — HOSPITAL ENCOUNTER (INPATIENT)
Facility: HOSPITAL | Age: 68
LOS: 28 days | Discharge: HOME HEALTH | DRG: 219 | End: 2018-06-20
Attending: THORACIC SURGERY (CARDIOTHORACIC VASCULAR SURGERY) | Admitting: ANESTHESIOLOGY
Payer: MEDICARE

## 2018-05-23 DIAGNOSIS — R06.02 SOB (SHORTNESS OF BREATH): ICD-10-CM

## 2018-05-23 DIAGNOSIS — R25.1 TREMOR: ICD-10-CM

## 2018-05-23 DIAGNOSIS — Q24.9 HEART ABNORMALITY: ICD-10-CM

## 2018-05-23 DIAGNOSIS — Z78.9 ON ENTERAL NUTRITION: ICD-10-CM

## 2018-05-23 DIAGNOSIS — Z95.2 S/P MVR (MITRAL VALVE REPLACEMENT): Primary | ICD-10-CM

## 2018-05-23 DIAGNOSIS — I49.9 ABNORMAL HEART RHYTHM: ICD-10-CM

## 2018-05-23 DIAGNOSIS — Z74.09 IMPAIRED FUNCTIONAL MOBILITY AND ENDURANCE: ICD-10-CM

## 2018-05-23 DIAGNOSIS — I48.3 TYPICAL ATRIAL FLUTTER: ICD-10-CM

## 2018-05-23 DIAGNOSIS — J96.01 ACUTE RESPIRATORY FAILURE WITH HYPOXIA: ICD-10-CM

## 2018-05-23 DIAGNOSIS — R00.0 TACHYCARDIA: ICD-10-CM

## 2018-05-23 DIAGNOSIS — I50.22 CHRONIC SYSTOLIC CONGESTIVE HEART FAILURE: ICD-10-CM

## 2018-05-23 DIAGNOSIS — I05.9 ENDOCARDITIS OF MITRAL VALVE: ICD-10-CM

## 2018-05-23 DIAGNOSIS — I50.9 CHF (CONGESTIVE HEART FAILURE): ICD-10-CM

## 2018-05-23 DIAGNOSIS — Q24.8 ABNORMAL CARDIAC VALVE: ICD-10-CM

## 2018-05-23 DIAGNOSIS — Z98.890 HISTORY OF OPEN HEART SURGERY: ICD-10-CM

## 2018-05-23 DIAGNOSIS — I08.0 MITRAL AND AORTIC VALVE REGURGITATION: ICD-10-CM

## 2018-05-23 DIAGNOSIS — D64.9 SYMPTOMATIC ANEMIA: ICD-10-CM

## 2018-05-23 DIAGNOSIS — Z01.818 PRE-OP EXAM: ICD-10-CM

## 2018-05-23 DIAGNOSIS — I25.118 CORONARY ARTERY DISEASE OF NATIVE ARTERY OF NATIVE HEART WITH STABLE ANGINA PECTORIS: ICD-10-CM

## 2018-05-23 DIAGNOSIS — Z95.1 S/P CABG X 1: ICD-10-CM

## 2018-05-23 DIAGNOSIS — I49.9 ARRHYTHMIA: ICD-10-CM

## 2018-05-23 DIAGNOSIS — R73.9 ACUTE HYPERGLYCEMIA: ICD-10-CM

## 2018-05-23 DIAGNOSIS — N17.9 AKI (ACUTE KIDNEY INJURY): ICD-10-CM

## 2018-05-23 DIAGNOSIS — R91.8 PULMONARY INFILTRATES ON CXR: ICD-10-CM

## 2018-05-23 DIAGNOSIS — R13.10 DYSPHAGIA, UNSPECIFIED TYPE: ICD-10-CM

## 2018-05-23 LAB
ABO + RH BLD: NORMAL
ALBUMIN SERPL BCP-MCNC: 4.1 G/DL
ALP SERPL-CCNC: 79 U/L
ALT SERPL W/O P-5'-P-CCNC: 24 U/L
ANION GAP SERPL CALC-SCNC: 10 MMOL/L
APTT BLDCRRT: 25.9 SEC
AST SERPL-CCNC: 25 U/L
BASOPHILS # BLD AUTO: 0.04 K/UL
BASOPHILS NFR BLD: 0.8 %
BILIRUB SERPL-MCNC: 0.8 MG/DL
BLD GP AB SCN CELLS X3 SERPL QL: NORMAL
BUN SERPL-MCNC: 34 MG/DL
CALCIUM SERPL-MCNC: 10.1 MG/DL
CHLORIDE SERPL-SCNC: 105 MMOL/L
CO2 SERPL-SCNC: 27 MMOL/L
CREAT SERPL-MCNC: 1.7 MG/DL
DIFFERENTIAL METHOD: ABNORMAL
EOSINOPHIL # BLD AUTO: 0.4 K/UL
EOSINOPHIL NFR BLD: 6.8 %
ERYTHROCYTE [DISTWIDTH] IN BLOOD BY AUTOMATED COUNT: 14 %
EST. GFR  (AFRICAN AMERICAN): 47.2 ML/MIN/1.73 M^2
EST. GFR  (NON AFRICAN AMERICAN): 40.8 ML/MIN/1.73 M^2
GLUCOSE SERPL-MCNC: 98 MG/DL
HCT VFR BLD AUTO: 35.6 %
HGB BLD-MCNC: 11.6 G/DL
IMM GRANULOCYTES # BLD AUTO: 0.01 K/UL
IMM GRANULOCYTES NFR BLD AUTO: 0.2 %
INR PPP: 1
LYMPHOCYTES # BLD AUTO: 1 K/UL
LYMPHOCYTES NFR BLD: 19.3 %
MAGNESIUM SERPL-MCNC: 2.1 MG/DL
MCH RBC QN AUTO: 31.2 PG
MCHC RBC AUTO-ENTMCNC: 32.6 G/DL
MCV RBC AUTO: 96 FL
MONOCYTES # BLD AUTO: 0.4 K/UL
MONOCYTES NFR BLD: 7 %
NEUTROPHILS # BLD AUTO: 3.4 K/UL
NEUTROPHILS NFR BLD: 65.9 %
NRBC BLD-RTO: 0 /100 WBC
PHOSPHATE SERPL-MCNC: 4.7 MG/DL
PLATELET # BLD AUTO: 158 K/UL
PMV BLD AUTO: 11.6 FL
POCT GLUCOSE: 105 MG/DL (ref 70–110)
POTASSIUM SERPL-SCNC: 4 MMOL/L
PROT SERPL-MCNC: 7.8 G/DL
PROTHROMBIN TIME: 10.5 SEC
RBC # BLD AUTO: 3.72 M/UL
SODIUM SERPL-SCNC: 142 MMOL/L
WBC # BLD AUTO: 5.13 K/UL

## 2018-05-23 PROCEDURE — 83735 ASSAY OF MAGNESIUM: CPT

## 2018-05-23 PROCEDURE — 20000000 HC ICU ROOM

## 2018-05-23 PROCEDURE — 85610 PROTHROMBIN TIME: CPT

## 2018-05-23 PROCEDURE — 85025 COMPLETE CBC W/AUTO DIFF WBC: CPT

## 2018-05-23 PROCEDURE — 85730 THROMBOPLASTIN TIME PARTIAL: CPT

## 2018-05-23 PROCEDURE — 84100 ASSAY OF PHOSPHORUS: CPT

## 2018-05-23 PROCEDURE — 80053 COMPREHEN METABOLIC PANEL: CPT

## 2018-05-23 PROCEDURE — 93010 ELECTROCARDIOGRAM REPORT: CPT | Mod: ,,, | Performed by: INTERNAL MEDICINE

## 2018-05-23 PROCEDURE — 86920 COMPATIBILITY TEST SPIN: CPT

## 2018-05-23 PROCEDURE — 86850 RBC ANTIBODY SCREEN: CPT

## 2018-05-23 PROCEDURE — 36415 COLL VENOUS BLD VENIPUNCTURE: CPT

## 2018-05-23 PROCEDURE — 93005 ELECTROCARDIOGRAM TRACING: CPT

## 2018-05-23 PROCEDURE — 25000003 PHARM REV CODE 250: Performed by: STUDENT IN AN ORGANIZED HEALTH CARE EDUCATION/TRAINING PROGRAM

## 2018-05-23 PROCEDURE — 94761 N-INVAS EAR/PLS OXIMETRY MLT: CPT

## 2018-05-23 RX ORDER — SODIUM CHLORIDE 0.9 % (FLUSH) 0.9 %
3 SYRINGE (ML) INJECTION
Status: DISCONTINUED | OUTPATIENT
Start: 2018-05-23 | End: 2018-05-25

## 2018-05-23 RX ORDER — TAMSULOSIN HYDROCHLORIDE 0.4 MG/1
0.8 CAPSULE ORAL ONCE
Status: COMPLETED | OUTPATIENT
Start: 2018-05-23 | End: 2018-05-23

## 2018-05-23 RX ADMIN — TAMSULOSIN HYDROCHLORIDE 0.8 MG: 0.4 CAPSULE ORAL at 08:05

## 2018-05-23 NOTE — NURSING
Pt admitted to ICU. Dr. Hawkins notified of pts arrival to 6065. See chart for orders received.     Admit skin note. All skin intact with no breakdown noted

## 2018-05-23 NOTE — PLAN OF CARE
Problem: Patient Care Overview  Goal: Plan of Care Review  Hx:  Mitral regurg (severe), Aortic regurg (mild), Pulm HTN, HTN   Outcome: Ongoing (interventions implemented as appropriate)  Plan of care reviewed with pt and spouse. Pt admitted to ICU for IABP tomorrow, cardiac surgery on Friday. All current vitals stable. Regular diet, NPO after midnight for procedure

## 2018-05-23 NOTE — HPI
Mr. Messina is a 66yo M with a h/o mitral valve endocarditis and resultant severe MR, TR and pulmonary HTN.  He was admitted to the SICU pre-operatively for IABP placement and subsequently underwent MVR and CABG on 5/25. Post op course complicated by CHB and LA thrombus. He is currently paced and started on AC. He was stepped down 5/29 but subsequently developed an ANNA with spike in BUN/Cr and re-admission to the SICU 5/30 for closer monitoring. He remained in the SICU for several days, was stepped down to the floor and then re-admitted to the SICU 6/13 given SOB and increased work of breathing. Upon transfer to the SICU he was placed on venti-mask for supplemental O2 administration.

## 2018-05-24 ENCOUNTER — ANESTHESIA EVENT (OUTPATIENT)
Dept: SURGERY | Facility: HOSPITAL | Age: 68
DRG: 219 | End: 2018-05-24
Payer: MEDICARE

## 2018-05-24 LAB
ALBUMIN SERPL BCP-MCNC: 3.3 G/DL
ALP SERPL-CCNC: 62 U/L
ALT SERPL W/O P-5'-P-CCNC: 19 U/L
ANION GAP SERPL CALC-SCNC: 7 MMOL/L
APTT BLDCRRT: 26.1 SEC
APTT BLDCRRT: 52.7 SEC
APTT BLDCRRT: NORMAL SEC
AST SERPL-CCNC: 16 U/L
BASOPHILS # BLD AUTO: 0.03 K/UL
BASOPHILS NFR BLD: 0.6 %
BILIRUB SERPL-MCNC: 0.5 MG/DL
BUN SERPL-MCNC: 34 MG/DL
CALCIUM SERPL-MCNC: 8.9 MG/DL
CHLORIDE SERPL-SCNC: 109 MMOL/L
CO2 SERPL-SCNC: 26 MMOL/L
CREAT SERPL-MCNC: 1.4 MG/DL
DIFFERENTIAL METHOD: ABNORMAL
EOSINOPHIL # BLD AUTO: 0.4 K/UL
EOSINOPHIL NFR BLD: 7.4 %
ERYTHROCYTE [DISTWIDTH] IN BLOOD BY AUTOMATED COUNT: 14 %
EST. GFR  (AFRICAN AMERICAN): 59.7 ML/MIN/1.73 M^2
EST. GFR  (NON AFRICAN AMERICAN): 51.6 ML/MIN/1.73 M^2
FACT X PPP CHRO-ACNC: <0.1 IU/ML
GLUCOSE SERPL-MCNC: 103 MG/DL
HCT VFR BLD AUTO: 27.9 %
HGB BLD-MCNC: 9.4 G/DL
IMM GRANULOCYTES # BLD AUTO: 0.01 K/UL
IMM GRANULOCYTES NFR BLD AUTO: 0.2 %
INR PPP: 1.1
LYMPHOCYTES # BLD AUTO: 1.2 K/UL
LYMPHOCYTES NFR BLD: 24.8 %
MAGNESIUM SERPL-MCNC: 2.1 MG/DL
MCH RBC QN AUTO: 31.6 PG
MCHC RBC AUTO-ENTMCNC: 33.7 G/DL
MCV RBC AUTO: 94 FL
MONOCYTES # BLD AUTO: 0.5 K/UL
MONOCYTES NFR BLD: 9.5 %
NEUTROPHILS # BLD AUTO: 2.7 K/UL
NEUTROPHILS NFR BLD: 57.5 %
NRBC BLD-RTO: 0 /100 WBC
PHOSPHATE SERPL-MCNC: 4.5 MG/DL
PLATELET # BLD AUTO: 130 K/UL
PMV BLD AUTO: 10.9 FL
POTASSIUM SERPL-SCNC: 3.6 MMOL/L
PROT SERPL-MCNC: 6.1 G/DL
PROTHROMBIN TIME: 11 SEC
RBC # BLD AUTO: 2.97 M/UL
SODIUM SERPL-SCNC: 142 MMOL/L
WBC # BLD AUTO: 4.72 K/UL

## 2018-05-24 PROCEDURE — 99152 MOD SED SAME PHYS/QHP 5/>YRS: CPT | Mod: ,,, | Performed by: INTERNAL MEDICINE

## 2018-05-24 PROCEDURE — C1894 INTRO/SHEATH, NON-LASER: HCPCS

## 2018-05-24 PROCEDURE — 25000003 PHARM REV CODE 250: Performed by: STUDENT IN AN ORGANIZED HEALTH CARE EDUCATION/TRAINING PROGRAM

## 2018-05-24 PROCEDURE — 27100094 HC IABP, SET-UP

## 2018-05-24 PROCEDURE — 33967 INSERT I-AORT PERCUT DEVICE: CPT | Mod: ,,, | Performed by: INTERNAL MEDICINE

## 2018-05-24 PROCEDURE — 85025 COMPLETE CBC W/AUTO DIFF WBC: CPT

## 2018-05-24 PROCEDURE — 85520 HEPARIN ASSAY: CPT

## 2018-05-24 PROCEDURE — 27200234 CATH LAB PROCEDURE

## 2018-05-24 PROCEDURE — 83735 ASSAY OF MAGNESIUM: CPT

## 2018-05-24 PROCEDURE — 99221 1ST HOSP IP/OBS SF/LOW 40: CPT | Mod: GC,,, | Performed by: ANESTHESIOLOGY

## 2018-05-24 PROCEDURE — 85730 THROMBOPLASTIN TIME PARTIAL: CPT | Mod: 91

## 2018-05-24 PROCEDURE — 84100 ASSAY OF PHOSPHORUS: CPT

## 2018-05-24 PROCEDURE — 36415 COLL VENOUS BLD VENIPUNCTURE: CPT

## 2018-05-24 PROCEDURE — 63600175 PHARM REV CODE 636 W HCPCS: Performed by: STUDENT IN AN ORGANIZED HEALTH CARE EDUCATION/TRAINING PROGRAM

## 2018-05-24 PROCEDURE — 63600175 PHARM REV CODE 636 W HCPCS

## 2018-05-24 PROCEDURE — 85610 PROTHROMBIN TIME: CPT

## 2018-05-24 PROCEDURE — 25000003 PHARM REV CODE 250

## 2018-05-24 PROCEDURE — 80053 COMPREHEN METABOLIC PANEL: CPT

## 2018-05-24 PROCEDURE — 5A02210 ASSISTANCE WITH CARDIAC OUTPUT USING BALLOON PUMP, CONTINUOUS: ICD-10-PCS | Performed by: INTERNAL MEDICINE

## 2018-05-24 PROCEDURE — 85730 THROMBOPLASTIN TIME PARTIAL: CPT

## 2018-05-24 PROCEDURE — 20000000 HC ICU ROOM

## 2018-05-24 RX ORDER — SODIUM,POTASSIUM PHOSPHATES 280-250MG
2 POWDER IN PACKET (EA) ORAL
Status: DISCONTINUED | OUTPATIENT
Start: 2018-05-24 | End: 2018-05-25

## 2018-05-24 RX ORDER — POTASSIUM CHLORIDE 20 MEQ/15ML
60 SOLUTION ORAL
Status: DISCONTINUED | OUTPATIENT
Start: 2018-05-24 | End: 2018-05-25

## 2018-05-24 RX ORDER — HEPARIN SODIUM 10000 [USP'U]/100ML
800 INJECTION, SOLUTION INTRAVENOUS CONTINUOUS
Status: DISCONTINUED | OUTPATIENT
Start: 2018-05-24 | End: 2018-05-25

## 2018-05-24 RX ORDER — LANOLIN ALCOHOL/MO/W.PET/CERES
800 CREAM (GRAM) TOPICAL
Status: DISCONTINUED | OUTPATIENT
Start: 2018-05-24 | End: 2018-05-25

## 2018-05-24 RX ORDER — HYDROCODONE BITARTRATE AND ACETAMINOPHEN 500; 5 MG/1; MG/1
TABLET ORAL
Status: DISCONTINUED | OUTPATIENT
Start: 2018-05-24 | End: 2018-05-25

## 2018-05-24 RX ORDER — LIDOCAINE HYDROCHLORIDE 10 MG/ML
1 INJECTION, SOLUTION EPIDURAL; INFILTRATION; INTRACAUDAL; PERINEURAL ONCE
Status: CANCELLED | OUTPATIENT
Start: 2018-05-24 | End: 2018-05-24

## 2018-05-24 RX ORDER — MUPIROCIN 20 MG/G
OINTMENT TOPICAL
Status: CANCELLED | OUTPATIENT
Start: 2018-05-24

## 2018-05-24 RX ORDER — POTASSIUM CHLORIDE 20 MEQ/15ML
40 SOLUTION ORAL
Status: DISCONTINUED | OUTPATIENT
Start: 2018-05-24 | End: 2018-05-25

## 2018-05-24 RX ADMIN — POTASSIUM CHLORIDE 40 MEQ: 20 SOLUTION ORAL at 04:05

## 2018-05-24 RX ADMIN — HEPARIN SODIUM 800 UNITS/HR: 10000 INJECTION, SOLUTION INTRAVENOUS at 07:05

## 2018-05-24 NOTE — PLAN OF CARE
Pt. admitted to ICU for planned MVR, TV repair and CABG. Pt. Lives with spouse who will assist with pt's care upon d/c. D/C needs to be determined.    Payor: [employer] MEDICARE / Plan: [employer] MEDICARE HMO / Product Type: Capitation /      Deric Claudio MD       [facility] - [place], [place] - [address] [facility]  [address] [facility] [address]  Phone: [phone] Fax: [phone]    [facility] [address] [place] [address] [address] [facility]  [address] [facility] [address]  Phone: [phone] Fax: [phone]    [id number] [address]  Phone: [phone] Fax: [phone]         05/24/18 1215   Discharge Assessment   Assessment Type Discharge Planning Assessment   Confirmed/corrected address and phone number on facesheet? Yes   Assessment information obtained from? Patient   Expected Length of Stay (days) 4   Communicated expected length of stay with patient/caregiver yes   Prior to hospitilization cognitive status: Alert/Oriented   Prior to hospitalization functional status: Independent   Current cognitive status: Alert/Oriented   Current Functional Status: Independent   Lives With spouse   Able to Return to Prior Arrangements yes   Is patient able to care for self after discharge? Yes   Who are your caregiver(s) and their phone number(s)? Lupe spouse [phone]   Patient's perception of discharge disposition home or selfcare   Readmission Within The Last 30 Days no previous admission in last 30 days   Patient currently being followed by outpatient case management? No   Patient currently receives any other outside agency services? No   Do you have any problems affording any of your prescribed medications? No   Is the patient taking medications as prescribed? yes   Does the patient have transportation home? Yes   Does the patient receive services at the [facility]? No   Discharge Plan A  Home;Home Health   Discharge Plan B Home;Home with family   Patient/Family In Agreement With Plan yes

## 2018-05-24 NOTE — SUBJECTIVE & OBJECTIVE
Interval History/Significant Events: No acute events overnight, afebrile, vital signs stable. Patient NPO since midnight for placement of IABP today.    Objective:     Vital Signs (Most Recent):  Temp: 98.7 °F (37.1 °C) (05/24/18 0300)  Pulse: 73 (05/24/18 0600)  Resp: 12 (05/24/18 0600)  BP: (!) 113/56 (05/24/18 0600)  SpO2: 96 % (05/24/18 0600) Vital Signs (24h Range):  Temp:  [98.3 °F (36.8 °C)-98.7 °F (37.1 °C)] 98.7 °F (37.1 °C)  Pulse:  [71-91] 73  Resp:  [12-33] 12  SpO2:  [95 %-100 %] 96 %  BP: (113-167)/(55-83) 113/56     Weight: 65.4 kg (144 lb 2.9 oz)  Body mass index is 20.69 kg/m².      Intake/Output Summary (Last 24 hours) at 05/24/18 0637  Last data filed at 05/24/18 0400   Gross per 24 hour   Intake              220 ml   Output              527 ml   Net             -307 ml       Physical Exam   Constitutional: He appears well-developed.   Temporal wasting   Cardiovascular: Normal rate and regular rhythm.    Murmur heard.  Pulmonary/Chest: Effort normal.   Neurological: He is alert.   Skin: Skin is warm and dry.       Lines/Drains/Airways     Peripherally Inserted Central Catheter Line                 PICC Double Lumen 03/05/18 1539 right basilic 79 days          Peripheral Intravenous Line                 Peripheral IV - Single Lumen 05/23/18 1200 Right Forearm less than 1 day         Peripheral IV - Single Lumen 05/23/18 1700 Left Wrist less than 1 day                Significant Labs:    CBC/Anemia Profile:    Recent Labs  Lab 05/23/18  1236 05/24/18  0300   WBC 5.13 4.72   HGB 11.6* 9.4*   HCT 35.6* 27.9*    130*   MCV 96 94   RDW 14.0 14.0        Chemistries:    Recent Labs  Lab 05/23/18  1236 05/24/18  0300    142   K 4.0 3.6    109   CO2 27 26   BUN 34* 34*   CREATININE 1.7* 1.4   CALCIUM 10.1 8.9   ALBUMIN 4.1 3.3*   PROT 7.8 6.1   BILITOT 0.8 0.5   ALKPHOS 79 62   ALT 24 19   AST 25 16   MG 2.1 2.1   PHOS 4.7* 4.5       Coagulation:   Recent Labs  Lab 05/24/18  0300   INR  1.1   APTT 26.1     Significant Imaging:  I have reviewed all pertinent imaging results/findings within the past 24 hours.

## 2018-05-24 NOTE — PROGRESS NOTES
[facility]  Critical Care - Surgery  Progress Note    Patient Name: Rodolfo Messina  MRN: [id number]  Admission Date: 5/23/2018  Hospital Length of Stay: 1 days  Code Status: Full Code  Attending Provider: Marvin Go MD  Primary Care Provider: Deric Claudio MD   Principal Problem: <principal problem not specified>    Subjective:     Interval History/Significant Events: No acute events overnight, afebrile, vital signs stable. Patient NPO since midnight for placement of IABP today.    Objective:     Vital Signs (Most Recent):  Temp: 98.7 °F (37.1 °C) (05/24/18 0300)  Pulse: 73 (05/24/18 0600)  Resp: 12 (05/24/18 0600)  BP: (!) 113/56 (05/24/18 0600)  SpO2: 96 % (05/24/18 0600) Vital Signs (24h Range):  Temp:  [98.3 °F (36.8 °C)-98.7 °F (37.1 °C)] 98.7 °F (37.1 °C)  Pulse:  [71-91] 73  Resp:  [12-33] 12  SpO2:  [95 %-100 %] 96 %  BP: (113-167)/(55-83) 113/56     Weight: 65.4 kg (144 lb 2.9 oz)  Body mass index is 20.69 kg/m².      Intake/Output Summary (Last 24 hours) at 05/24/18 0637  Last data filed at 05/24/18 0400   Gross per 24 hour   Intake              220 ml   Output              527 ml   Net             -307 ml       Physical Exam   Constitutional: He appears well-developed.   Temporal wasting   Cardiovascular: Normal rate and regular rhythm.    Murmur heard.  Pulmonary/Chest: Effort normal.   Neurological: He is alert.   Skin: Skin is warm and dry.       Lines/Drains/Airways     Peripherally Inserted Central Catheter Line                 PICC Double Lumen 03/05/18 1539 right basilic 79 days          Peripheral Intravenous Line                 Peripheral IV - Single Lumen 05/23/18 1200 Right Forearm less than 1 day         Peripheral IV - Single Lumen 05/23/18 1700 Left Wrist less than 1 day                Significant Labs:    CBC/Anemia Profile:    Recent Labs  Lab 05/23/18  1236 05/24/18  0300   WBC 5.13 4.72   HGB 11.6* 9.4*   HCT 35.6* 27.9*    130*   MCV 96 94   RDW 14.0  14.0        Chemistries:    Recent Labs  Lab 05/23/18  1236 05/24/18  0300    142   K 4.0 3.6    109   CO2 27 26   BUN 34* 34*   CREATININE 1.7* 1.4   CALCIUM 10.1 8.9   ALBUMIN 4.1 3.3*   PROT 7.8 6.1   BILITOT 0.8 0.5   ALKPHOS 79 62   ALT 24 19   AST 25 16   MG 2.1 2.1   PHOS 4.7* 4.5       Coagulation:   Recent Labs  Lab 05/24/18  0300   INR 1.1   APTT 26.1     Significant Imaging:  I have reviewed all pertinent imaging results/findings within the past 24 hours.    Assessment/Plan:     CHF (congestive heart failure)    68yo M with PMH of mitral valve endocarditis and resultant severe MR, TR and pulmonary HTN who is admitted to the SICU pre-operatively for IABP placement tomorrow (5/24) and has MVR, TV repair, AVR and CABG planned for Friday 5/25.    Plan:    Neuro:   -AAO x 3, no sedation required  -Pre-op, denies pain     Pulmonary:   -Extubated, saturating well on room air  -Continuous pulse ox  -Supplemental O2 prn    Cardiac:  -MAP goal >65  -HDS not requiring pressors   -Continuous cardiac monitoring    Renal:   -UOP adequate  -Flomax daily given home use  -Bun/Cr 34/1.4 (baseline Cr 1.4-1.6)    Fluids/Electrolytes/Nutrition/GI:   -Nutritional status: NPO since midnight for procedure today  -replace lytes PRN    Hematology/Oncology:  -H/H 9.4/27.9, continue to monitor  -INR/Plts 1.1/130  -CBC daily    Infectious Disease:   -Afebrile  -WBC 4.72  -CBC daily   -Abx: none indicated currently    Endocrine:  - No h/o DM or thyroid dysfunction, continue to monitor  - Glucose goal of 120-180    Dispo:  -Continue care in the ICU setting  -[facility] primary               Critical care was time spent personally by me on the following activities: development of treatment plan with patient or surrogate and bedside caregivers, discussions with consultants, evaluation of patient's response to treatment, examination of patient, ordering and performing treatments and interventions, ordering and review of laboratory  studies, ordering and review of radiographic studies, pulse oximetry, re-evaluation of patient's condition.  This critical care time did not overlap with that of any other provider or involve time for any procedures.     Pepper Chadwick MD  Critical Care - Surgery  [facility]

## 2018-05-24 NOTE — ASSESSMENT & PLAN NOTE
-IABP placement via R CFA  -The risks, benefits & alternatives of the procedure were explained to the patient.    -The risks of IABP insertion include but are not limited to:  Bleeding, infection, heart rhythm abnormalities, allergic reactions, kidney injury, stroke and death.    -The risks of moderate sedation include hypotension, respiratory depression, arrhythmias, bronchospasm, & death.    -Informed consent was obtained & the patient is agreeable to proceed with the procedure.

## 2018-05-24 NOTE — ASSESSMENT & PLAN NOTE
68yo M with PMH of mitral valve endocarditis and resultant severe MR, TR and pulmonary HTN who is admitted to the SICU pre-operatively for IABP placement tomorrow (5/24) and has MVR, TV repair, AVR and CABG planned for Friday 5/25.    Plan:    Neuro:   -AAO x 3, no sedation required  -Pre-op, denies pain     Pulmonary:   -Extubated, saturating well on room air  -Continuous pulse ox  -Supplemental O2 prn    Cardiac:  -MAP goal >65  -HDS not requiring pressors   -Continuous cardiac monitoring    Renal:   -UOP adequate  -Flomax daily given home use  -Bun/Cr 34/1.7 (baseline Cr 1.4-1.6)    Fluids/Electrolytes/Nutrition/GI:   -Nutritional status: Regular diet now, NPO at midnight given procedure tomorrow  -replace lytes PRN    Hematology/Oncology:  -H/H 11.6/35.6  -INR/Plts 1.0/158  -CBC daily    Infectious Disease:   -Afebrile  -WBC 5.13  -CBC daily   -Abx: none indicated currently    Endocrine:  - No h/o DM or thyroid dysfunction, continue to monitor  - Glucose goal of 120-180    Dispo:  -Continue care in the ICU setting  -[facility] primary

## 2018-05-24 NOTE — H&P
[facility]  Cardiothoracic Surgery  History & Physical    Patient Name: Rodolfo Messina  MRN: [id number]  Admission Date: 5/23/2018  Attending Physician: Marvin Go MD  Referring Provider: Marvin Go MD    Patient information was obtained from patient, spouse/SO and past medical records.     Subjective:     Chief Complaint/Reason for Admission: Mitral regurgitation    History of Present Illness: Mr. Messina is a 68yo M with a h/o mitral valve endocarditis and resultant severe MR, TR and pulmonary HTN.  The patient denies angina.  He denies exertional dyspnea, but reports he is sedentary.  He repots 1+ bilateral LE edema, but denies orthopnea or PND.  He denies palpitations, syncope, or near sycnope. The patient report that the etiology of his endocarditis remains unknown; however he is under the impression that it may be related to a urologic procedure. The pateint denies current faver/ chills. He is admitted to the SICU pre-operatively for IABP placement tomorrow and has MVR, TV repair, AVR and CABG planned for Friday 5/25.    No current facility-administered medications on file prior to encounter.      Current Outpatient Prescriptions on File Prior to Encounter   Medication Sig    albuterol-ipratropium 2.5mg-0.5mg/3mL (DUO-NEB) 0.5 mg-3 mg(2.5 mg base)/3 mL nebulizer solution 3 mLs every 4 (four) hours as needed.     aspirin (ECOTRIN) 81 MG EC tablet Take 1 tablet (81 mg total) by mouth once daily.    atorvastatin (LIPITOR) 20 MG tablet Take 2 tablets (40 mg total) by mouth once daily.    benzonatate (TESSALON) 100 MG capsule Take 100 mg by mouth 3 (three) times daily as needed for Cough.    furosemide (LASIX) 40 MG tablet Take 1 tablet (40 mg total) by mouth once daily.    loratadine (CLARITIN) 10 mg tablet Take 10 mg by mouth once daily.    mirtazapine (REMERON) 30 MG tablet Take 1 tablet (30 mg total) by mouth every evening.    pantoprazole (PROTONIX) 40 MG tablet Take 1 tablet  (40 mg total) by mouth once daily.    tamsulosin (FLOMAX) 0.4 mg Cp24 Take 2 capsules (0.8 mg total) by mouth 2 (two) times daily.    VENTOLIN HFA 90 mcg/actuation inhaler        Review of patient's allergies indicates:   Allergen Reactions    Augmentin [amoxicillin-pot clavulanate]      Patient felt that it raised BP and requested to have it added as intolerance. Tolerated unasyn    Ciprofloxacin     Flagyl [metronidazole]     Lisinopril Other (See Comments)     cough    Mysoline [primidone]     Omnicef [cefdinir]     Shellfish containing products        Past Medical History:   Diagnosis Date    ANNA (acute kidney injury) 2/22/2018    Anemia     Anxiety     Arthritis     BPH (benign prostatic hyperplasia)     CHF (congestive heart failure) 5/23/2018    Coronary artery disease of native artery of native heart with stable angina pectoris 5/21/2018    Diverticulosis     Encounter for blood transfusion     Hypertension     Urinary retention      Past Surgical History:   Procedure Laterality Date    APPENDECTOMY      COLONOSCOPY      COLONOSCOPY N/A 2/1/2018    Procedure: COLONOSCOPY;  Surgeon: Richar Payan MD;  Location: [address]);  Service: Endoscopy;  Laterality: N/A;  PM prep    EYE SURGERY Right     cataract extraction    FINGER SURGERY      FRACTURE SURGERY Right     index finger    TONSILLECTOMY       Family History     Problem Relation (Age of Onset)    Heart disease Mother, Father    No Known Problems Sister, Brother    Stroke Mother        Social History Main Topics    Smoking status: Never Smoker    Smokeless tobacco: Never Used    Alcohol use No      Comment: none recently    Drug use: No    Sexual activity: Not Currently     Partners: Female     Review of Systems   Constitutional: Negative for chills and fever.   Respiratory: Positive for shortness of breath.    Cardiovascular: Negative for chest pain and palpitations.   Gastrointestinal: Negative for abdominal  pain.   Neurological: Negative for seizures.     Objective:     Vital Signs (Most Recent):  Temp: 98.7 °F (37.1 °C) (05/24/18 0300)  Pulse: 71 (05/24/18 0545)  Resp: 12 (05/24/18 0545)  BP: (!) 119/58 (05/24/18 0500)  SpO2: 96 % (05/24/18 0545) Vital Signs (24h Range):  Temp:  [98.3 °F (36.8 °C)-98.7 °F (37.1 °C)] 98.7 °F (37.1 °C)  Pulse:  [71-91] 71  Resp:  [12-33] 12  SpO2:  [95 %-100 %] 96 %  BP: (113-167)/(55-83) 119/58     Weight: 65.4 kg (144 lb 2.9 oz)  Body mass index is 20.69 kg/m².    SpO2: 96 %  O2 Device (Oxygen Therapy): room air     Intake/Output - Last 3 Shifts       05/22 0700 - 05/23 0659 05/23 0700 - 05/24 0659    P.O.  220    Total Intake(mL/kg)  220 (3.4)    Urine (mL/kg/hr)  526    Stool  1    Total Output   527    Net   -307          Urine Occurrence  375 x           Lines/Drains/Airways     Peripherally Inserted Central Catheter Line                 PICC Double Lumen 03/05/18 1539 right basilic 79 days          Peripheral Intravenous Line                 Peripheral IV - Single Lumen 05/23/18 1200 Right Forearm less than 1 day         Peripheral IV - Single Lumen 05/23/18 1700 Left Wrist less than 1 day                   Physical Exam   Constitutional: He appears well-developed.   Temporal wasting   Cardiovascular: Normal rate and regular rhythm.    Murmur heard.  Pulmonary/Chest: Effort normal.   Neurological: He is alert.   Skin: Skin is warm and dry.       Significant Labs:  CBC:   Recent Labs  Lab 05/24/18  0300   WBC 4.72   RBC 2.97*   HGB 9.4*   HCT 27.9*   *   MCV 94   MCH 31.6*   MCHC 33.7     CMP:   Recent Labs  Lab 05/24/18  0300      CALCIUM 8.9   ALBUMIN 3.3*   PROT 6.1      K 3.6   CO2 26      BUN 34*   CREATININE 1.4   ALKPHOS 62   ALT 19   AST 16   BILITOT 0.5     Assessment/Plan:       CHF (congestive heart failure)    - Plan for IABP 5/24/18 prior to surgery  - NPO for procedure  - Daily labs  - Plan for surgery 5/25/18            Lupe Hawkins,  MD  Cardiothoracic Surgery  [facility]

## 2018-05-24 NOTE — H&P
[facility]  Critical Care - Surgery  History & Physical    Patient Name: Rodolfo Messina  MRN: [id number]  Admission Date: 5/23/2018  Code Status: Full Code  Attending Physician: Marvin Go MD   Primary Care Provider: Deric Claudio MD   Principal Problem: <principal problem not specified>    Subjective:     HPI:  Mr. Messina is a 66yo M with a h/o mitral valve endocarditis and resultant severe MR, TR and pulmonary HTN.  The patient denies angina.  He denies exertional dyspnea, but reports he is sedentary.  He repots 1+ bilateral LE edema, but denies orthopnea or PND.  He denies palpitations, syncope, or near sycnope. The patient report that the etiology of his endocarditis remains unknown; however he is under the impression that it may be related to a urologic procedure. The pateint denies current faver/ chills. He is admitted to the SICU pre-operatively for IABP placement tomorrow and has MVR, TV repair, AVR and CABG planned for Friday 5/25.         Past Medical History:   Diagnosis Date    ANNA (acute kidney injury) 2/22/2018    Anemia     Anxiety     Arthritis     BPH (benign prostatic hyperplasia)     CHF (congestive heart failure) 5/23/2018    Coronary artery disease of native artery of native heart with stable angina pectoris 5/21/2018    Diverticulosis     Encounter for blood transfusion     Hypertension     Urinary retention        Past Surgical History:   Procedure Laterality Date    APPENDECTOMY      COLONOSCOPY      COLONOSCOPY N/A 2/1/2018    Procedure: COLONOSCOPY;  Surgeon: Richar Payan MD;  Location: [address]);  Service: Endoscopy;  Laterality: N/A;  PM prep    EYE SURGERY Right     cataract extraction    FINGER SURGERY      FRACTURE SURGERY Right     index finger    TONSILLECTOMY         Review of patient's allergies indicates:   Allergen Reactions    Augmentin [amoxicillin-pot clavulanate]      Patient felt that it raised BP and requested  to have it added as intolerance. Tolerated unasyn    Ciprofloxacin     Flagyl [metronidazole]     Lisinopril Other (See Comments)     cough    Mysoline [primidone]     Omnicef [cefdinir]     Shellfish containing products        Family History     Problem Relation (Age of Onset)    Heart disease Mother, Father    No Known Problems Sister, Brother    Stroke Mother        Social History Main Topics    Smoking status: Never Smoker    Smokeless tobacco: Never Used    Alcohol use No      Comment: none recently    Drug use: No    Sexual activity: Not Currently     Partners: Female      Review of Systems   Constitutional: Negative for activity change, appetite change, chills, fatigue and fever.   HENT: Negative for congestion, ear pain, rhinorrhea and sore throat.    Eyes: Negative for pain, discharge and visual disturbance.   Respiratory: Negative for cough, chest tightness and shortness of breath.    Cardiovascular: Negative for chest pain and palpitations.   Gastrointestinal: Negative for abdominal distention, abdominal pain, blood in stool, constipation, diarrhea, nausea and vomiting.   Genitourinary: Negative for difficulty urinating.   Musculoskeletal: Negative for back pain and neck pain.   Skin: Negative for color change and rash.   Neurological: Negative for dizziness, numbness and headaches.   Hematological: Negative for adenopathy.   Psychiatric/Behavioral: Negative.      Objective:     Vital Signs (Most Recent):  Temp: 98.3 °F (36.8 °C) (05/23/18 1900)  Pulse: 74 (05/23/18 2100)  Resp: (!) 22 (05/23/18 2100)  BP: (!) 154/72 (05/23/18 2100)  SpO2: 99 % (05/23/18 2100) Vital Signs (24h Range):  Temp:  [98.3 °F (36.8 °C)-98.7 °F (37.1 °C)] 98.3 °F (36.8 °C)  Pulse:  [72-91] 74  Resp:  [13-29] 22  SpO2:  [97 %-100 %] 99 %  BP: (135-167)/(65-83) 154/72     Weight: 65.4 kg (144 lb 2.9 oz)  Body mass index is 20.69 kg/m².      Intake/Output Summary (Last 24 hours) at 05/23/18 7859  Last data filed at  05/23/18 2100   Gross per 24 hour   Intake              220 ml   Output                2 ml   Net              218 ml       Physical Exam   Constitutional: He is oriented to person, place, and time. He appears well-developed and well-nourished.   HENT:   Head: Normocephalic and atraumatic.   Nose: Nose normal.   Eyes: Conjunctivae and EOM are normal. Pupils are equal, round, and reactive to light. No scleral icterus.   Neck: Normal range of motion. No thyromegaly present.   Cardiovascular: Normal rate and regular rhythm.    Pulmonary/Chest: Effort normal and breath sounds normal.   Abdominal: Soft. He exhibits no distension. There is no tenderness.   Musculoskeletal: Normal range of motion.   Lymphadenopathy:     He has no cervical adenopathy.   Neurological: He is alert and oriented to person, place, and time.   Skin: Skin is warm and dry. No rash noted.       Lines/Drains/Airways     Peripherally Inserted Central Catheter Line                 PICC Double Lumen 03/05/18 1539 right basilic 79 days          Peripheral Intravenous Line                 Peripheral IV - Single Lumen 05/23/18 1200 Right Forearm less than 1 day         Peripheral IV - Single Lumen 05/23/18 1700 Left Wrist less than 1 day                Significant Labs:    CBC/Anemia Profile:    Recent Labs  Lab 05/23/18  1236   WBC 5.13   HGB 11.6*   HCT 35.6*      MCV 96   RDW 14.0        Chemistries:    Recent Labs  Lab 05/23/18  1236      K 4.0      CO2 27   BUN 34*   CREATININE 1.7*   CALCIUM 10.1   ALBUMIN 4.1   PROT 7.8   BILITOT 0.8   ALKPHOS 79   ALT 24   AST 25   MG 2.1   PHOS 4.7*     Coagulation:   Recent Labs  Lab 05/23/18  1236   INR 1.0   APTT 25.9       Significant Imaging: I have reviewed all pertinent imaging results/findings within the past 24 hours.    Assessment/Plan:     CHF (congestive heart failure)    66yo M with PMH of mitral valve endocarditis and resultant severe MR, TR and pulmonary HTN who is admitted to the  SICU pre-operatively for IABP placement tomorrow (5/24) and has MVR, TV repair, AVR and CABG planned for Friday 5/25.    Plan:    Neuro:   -AAO x 3, no sedation required  -Pre-op, denies pain     Pulmonary:   -Extubated, saturating well on room air  -Continuous pulse ox  -Supplemental O2 prn    Cardiac:  -MAP goal >65  -HDS not requiring pressors   -Continuous cardiac monitoring    Renal:   -UOP adequate  -Flomax daily given home use  -Bun/Cr 34/1.7 (baseline Cr 1.4-1.6)    Fluids/Electrolytes/Nutrition/GI:   -Nutritional status: Regular diet now, NPO at midnight given procedure tomorrow  -replace lytes PRN    Hematology/Oncology:  -H/H 11.6/35.6  -INR/Plts 1.0/158  -CBC daily    Infectious Disease:   -Afebrile  -WBC 5.13  -CBC daily   -Abx: none indicated currently    Endocrine:  - No h/o DM or thyroid dysfunction, continue to monitor  - Glucose goal of 120-180    Dispo:  -Continue care in the ICU setting  -[facility] primary                  Critical care was time spent personally by me on the following activities: development of treatment plan with patient or surrogate and bedside caregivers, discussions with consultants, evaluation of patient's response to treatment, examination of patient, ordering and performing treatments and interventions, ordering and review of laboratory studies, ordering and review of radiographic studies, pulse oximetry, re-evaluation of patient's condition.  This critical care time did not overlap with that of any other provider or involve time for any procedures.     Pepper Chadwick MD  Critical Care - Surgery  [facility]

## 2018-05-24 NOTE — SUBJECTIVE & OBJECTIVE
Interval History: NAEON. Doing well this am. Plan for IABP placement today.    Medications:  Continuous Infusions:   heparin (porcine) in 5 % dex       Scheduled Meds:  PRN Meds:sodium chloride, sodium chloride, sodium chloride, magnesium oxide, magnesium oxide, potassium chloride 10%, potassium chloride 10%, potassium chloride 10%, potassium, sodium phosphates, potassium, sodium phosphates, potassium, sodium phosphates, sodium chloride 0.9%     Objective:     Vital Signs (Most Recent):  Temp: 97.5 °F (36.4 °C) (05/24/18 1143)  Pulse: 73 (05/24/18 1143)  Resp: 12 (05/24/18 1143)  BP: 137/67 (05/24/18 1500)  SpO2: 99 % (05/24/18 1600) Vital Signs (24h Range):  Temp:  [97.5 °F (36.4 °C)-98.7 °F (37.1 °C)] 97.5 °F (36.4 °C)  Pulse:  [71-91] 73  Resp:  [12-33] 12  SpO2:  [95 %-99 %] 99 %  BP: (113-164)/(55-96) 137/67     Weight: 65.4 kg (144 lb 2.9 oz)  Body mass index is 20.69 kg/m².    SpO2: 99 %  O2 Device (Oxygen Therapy): room air    Intake/Output - Last 3 Shifts       05/22 0700 - 05/23 0659 05/23 0700 - 05/24 0659 05/24 0700 - 05/25 0659    P.O.  220     Total Intake(mL/kg)  220 (3.4)     Urine (mL/kg/hr)  526     Stool  1     Total Output   527      Net   -307             Urine Occurrence  375 x           Lines/Drains/Airways     Peripherally Inserted Central Catheter Line                 PICC Double Lumen 03/05/18 1539 right basilic 80 days          Line                 IABP 05/24/18 1115 7.5 Fr. 40 mL less than 1 day          Peripheral Intravenous Line                 Peripheral IV - Single Lumen 05/23/18 1200 Right Forearm 1 day         Peripheral IV - Single Lumen 05/23/18 1700 Left Wrist 1 day                Physical Exam   Constitutional: He appears well-developed.   Cardiovascular: Normal rate and regular rhythm.    Pulmonary/Chest: Effort normal.   Skin: Skin is warm and dry.       Significant Labs:  CBC:   Recent Labs  Lab 05/24/18  0300   WBC 4.72   RBC 2.97*   HGB 9.4*   HCT 27.9*   *   MCV  94   MCH 31.6*   MCHC 33.7     CMP:   Recent Labs  Lab 05/24/18  0300      CALCIUM 8.9   ALBUMIN 3.3*   PROT 6.1      K 3.6   CO2 26      BUN 34*   CREATININE 1.4   ALKPHOS 62   ALT 19   AST 16   BILITOT 0.5

## 2018-05-24 NOTE — ASSESSMENT & PLAN NOTE
- Plan for IABP 5/24/18 prior to surgery  - NPO for procedure  - Daily labs  - Plan for surgery 5/25/18

## 2018-05-24 NOTE — ANESTHESIA PREPROCEDURE EVALUATION
[facility]  Anesthesia Pre-Operative Evaluation         Patient Name: Rodolfo Messina  YOB: 1950  MRN: [id number]    SUBJECTIVE:     Pre-operative evaluation for Procedure(s) (LRB):  Mitral Valve Replacement (N/A)  REPAIR-VALVE-TRICUSPID (N/A)  REPLACEMENT-VALVE-AORTIC (N/A)  AORTOCORONARY BYPASS-CABG (N/A)     05/24/2018    Rodolfo Messina is a 67 y.o. male w/ a significant PMHx of anxiety, CHF, HTN, and mitral valve endocarditis complicated by severe MR, TR, and pulmonary HTN who was admitted to the SICU pre-operatively for IABP placement on (5/24) and has MVR, TV repair, AVR and CABG planned for Friday 5/25. Patient now presents for the above procedure(s).      LDA:        PICC Double Lumen 03/05/18 1539 right basilic (Active)   Number of days: 79            Peripheral IV - Single Lumen 05/23/18 1200 Right Forearm (Active)   Site Assessment Clean;Dry;Intact 5/24/2018  3:00 AM   Line Status Saline locked 5/24/2018  3:00 AM   Dressing Status Clean;Dry;Intact 5/24/2018  3:00 AM   Dressing Intervention New dressing 5/23/2018  4:00 PM   Site Change Due 05/27/18 5/24/2018  3:00 AM   Reason Not Rotated Not due 5/24/2018  3:00 AM   Number of days: 0            Peripheral IV - Single Lumen 05/23/18 1700 Left Wrist (Active)   Site Assessment Clean;Dry;Intact 5/24/2018  3:00 AM   Line Status Saline locked 5/24/2018  3:00 AM   Dressing Status Clean;Dry;Intact 5/24/2018  3:00 AM   Site Change Due 05/27/18 5/24/2018  3:00 AM   Reason Not Rotated Not due 5/24/2018  3:00 AM   Number of days: 0       Prev airway: None documented.    Drips: None documented.      Patient Active Problem List   Diagnosis    Tremor    HTN (hypertension)    PVC (premature ventricular contraction)    Anxiety    Generalized abdominal pain    Acute right-sided low back pain without sciatica    Anemia    Colon cancer screening    Symptomatic anemia    ANNA (acute kidney injury)    Chronic cough    Pulmonary  nodules    Mitral and aortic valve regurgitation    Pulmonary hypertension due to mitral valve disease    Subacute bacterial endocarditis    Acute cardiogenic pulmonary edema    Endocarditis    Aortic valve endocarditis    Endocarditis of mitral valve    Urinary retention with incomplete bladder emptying    Lower extremity edema    Edema    Erosive gastropathy    Acute pneumonitis    Pre-op evaluation    Non-rheumatic mitral regurgitation    Coronary artery disease of native artery of native heart with stable angina pectoris    CHF (congestive heart failure)       Review of patient's allergies indicates:   Allergen Reactions    Augmentin [amoxicillin-pot clavulanate]      Patient felt that it raised BP and requested to have it added as intolerance. Tolerated unasyn    Ciprofloxacin     Flagyl [metronidazole]     Lisinopril Other (See Comments)     cough    Mysoline [primidone]     Omnicef [cefdinir]     Shellfish containing products        Current Inpatient Medications:      No current facility-administered medications on file prior to encounter.      Current Outpatient Prescriptions on File Prior to Encounter   Medication Sig Dispense Refill    albuterol-ipratropium 2.5mg-0.5mg/3mL (DUO-NEB) 0.5 mg-3 mg(2.5 mg base)/3 mL nebulizer solution 3 mLs every 4 (four) hours as needed.       aspirin (ECOTRIN) 81 MG EC tablet Take 1 tablet (81 mg total) by mouth once daily.  0    atorvastatin (LIPITOR) 20 MG tablet Take 2 tablets (40 mg total) by mouth once daily. 180 tablet 3    benzonatate (TESSALON) 100 MG capsule Take 100 mg by mouth 3 (three) times daily as needed for Cough.      furosemide (LASIX) 40 MG tablet Take 1 tablet (40 mg total) by mouth once daily. 30 tablet 1    loratadine (CLARITIN) 10 mg tablet Take 10 mg by mouth once daily.      mirtazapine (REMERON) 30 MG tablet Take 1 tablet (30 mg total) by mouth every evening. 90 tablet 3    pantoprazole (PROTONIX) 40 MG tablet Take 1  tablet (40 mg total) by mouth once daily. 30 tablet 5    tamsulosin (FLOMAX) 0.4 mg Cp24 Take 2 capsules (0.8 mg total) by mouth 2 (two) times daily. 120 capsule 11    VENTOLIN HFA 90 mcg/actuation inhaler          Past Surgical History:   Procedure Laterality Date    APPENDECTOMY      COLONOSCOPY      COLONOSCOPY N/A 2/1/2018    Procedure: COLONOSCOPY;  Surgeon: Richar Payan MD;  Location: [address]);  Service: Endoscopy;  Laterality: N/A;  PM prep    EYE SURGERY Right     cataract extraction    FINGER SURGERY      FRACTURE SURGERY Right     index finger    TONSILLECTOMY         Social History     Social History    Marital status:      Spouse name: N/A    Number of children: N/A    Years of education: N/A     Occupational History     [employer]     Social History Main Topics    Smoking status: Never Smoker    Smokeless tobacco: Never Used    Alcohol use No      Comment: none recently    Drug use: No    Sexual activity: Not Currently     Partners: Female     Other Topics Concern    Not on file     Social History Narrative    No narrative on file       OBJECTIVE:     Vital Signs Range (Last 24H):  Temp:  [36.8 °C (98.3 °F)-37.1 °C (98.7 °F)]   Pulse:  [71-91]   Resp:  [12-33]   BP: (113-167)/(55-83)   SpO2:  [95 %-100 %]       CBC:   Recent Labs      05/23/18   1236  05/24/18   0300   WBC  5.13  4.72   RBC  3.72*  2.97*   HGB  11.6*  9.4*   HCT  35.6*  27.9*   PLT  158  130*   MCV  96  94   MCH  31.2*  31.6*   MCHC  32.6  33.7       CMP:   Recent Labs      05/23/18   1236  05/24/18   0300   NA  142  142   K  4.0  3.6   CL  105  109   CO2  27  26   BUN  34*  34*   CREATININE  1.7*  1.4   GLU  98  103   MG  2.1  2.1   PHOS  4.7*  4.5   CALCIUM  10.1  8.9   ALBUMIN  4.1  3.3*   PROT  7.8  6.1   ALKPHOS  79  62   ALT  24  19   AST  25  16   BILITOT  0.8  0.5       INR:  Recent Labs      05/23/18   1236  05/24/18   0300   INR  1.0  1.1   APTT  25.9  26.1       Diagnostic Studies:      EK/8/18    Test Reason : R07.9  Vent. Rate : 088 BPM     Atrial Rate : 088 BPM     P-R Int : 208 ms          QRS Dur : 090 ms      QT Int : 388 ms       P-R-T Axes : 044 -06 050 degrees     QTc Int : 469 ms    Normal sinus rhythm  Voltage criteria for left ventricular hypertrophy  Abnormal ECG  When compared with ECG of 08-MAY-2018 08:56,  No significant change was found    2D ECHO:  Results for orders placed or performed during the hospital encounter of 18   2D Echo w/ Color Flow Doppler   Result Value Ref Range    EF 60 55 - 65    Mitral Valve Regurgitation SEVERE (A)     Aortic Valve Regurgitation MILD     Est. PA Systolic Pressure 85.44 (A)     Mitral Valve Mobility NORMAL          ASSESSMENT/PLAN:         Anesthesia Evaluation    I have reviewed the Patient Summary Reports.    I have reviewed the Nursing Notes.   I have reviewed the Medications.     Review of Systems  Anesthesia Hx:  No problems with previous Anesthesia Denies Hx of Anesthetic complications  History of prior surgery of interest to airway management or planning: Previous anesthesia: MAC Denies Family Hx of Anesthesia complications.   Denies Personal Hx of Anesthesia complications.   Social:  Non-Smoker, No Alcohol Use    Hematology/Oncology:     Oncology Normal    -- Anemia:   EENT/Dental:EENT/Dental Normal   Cardiovascular:   Exercise tolerance: poor Hypertension Valvular problems/Murmurs CAD   Angina CHF HAQ ECG has been reviewed. mitral valve endocarditis    Pulmonary:   Shortness of breath    Renal/:   Chronic Renal Disease, ARF    Hepatic/GI:  Hepatic/GI Normal    Musculoskeletal:  Musculoskeletal Normal    Neurological:  Neurology Normal    Endocrine:  Endocrine Normal    Dermatological:  Skin Normal    Psych:   Psychiatric History anxiety          Physical Exam  General:  Well nourished    Airway/Jaw/Neck:  Airway Findings: Mouth Opening: Normal Tongue: Normal  General Airway Assessment: Adult  Mallampati: III   Improves to II with phonation.  TM Distance: Normal, at least 6 cm  Jaw/Neck Findings:  Neck ROM: Normal ROM  Neck Findings: Normal    Eyes/Ears/Nose:  EYES/EARS/NOSE FINDINGS: Normal   Dental:  Dental Findings: In tact, Periodontal disease, Mild   Chest/Lungs:  Chest/Lungs Findings: Clear to auscultation, Normal Respiratory Rate     Heart/Vascular:  Heart Findings: Rate: Normal  Rhythm: Regular Rhythm  Heart Murmur     Abdomen:  Abdomen Findings: Normal    Musculoskeletal:  Musculoskeletal Findings: Normal   Skin:  Skin Findings: Normal    Mental Status:  Mental Status Findings:  Cooperative, Alert and Oriented         Anesthesia Plan  Type of Anesthesia, risks & benefits discussed:  Anesthesia Type:  general  Patient's Preference:   Intra-op Monitoring Plan: arterial line, central line and standard ASA monitors  Intra-op Monitoring Plan Comments:   Post Op Pain Control Plan: multimodal analgesia and per primary service following discharge from PACU  Post Op Pain Control Plan Comments:   Induction:   IV  Beta Blocker:  Patient is not currently on a Beta-Blocker (No further documentation required).       Informed Consent: Patient understands risks and agrees with Anesthesia plan.  Questions answered. Anesthesia consent signed with patient.  ASA Score: 4     Day of Surgery Review of History & Physical:    H&P update referred to the surgeon.         Ready For Surgery From Anesthesia Perspective.

## 2018-05-24 NOTE — HPI
Mr. Messina is a 68yo M with a h/o mitral valve endocarditis and resultant severe MR, TR and pulmonary HTN.  The patient denies angina.  He denies exertional dyspnea, but reports he is sedentary.  He repots 1+ bilateral LE edema, but denies orthopnea or PND.  He denies palpitations, syncope, or near sycnope. The patient report that the etiology of his endocarditis remains unknown; however he is under the impression that it may be related to a urologic procedure. The pateint denies current faver/ chills. He is admitted to the SICU pre-operatively for IABP placement tomorrow and has MVR, TV repair, AVR and CABG planned for Friday 5/25.

## 2018-05-24 NOTE — SUBJECTIVE & OBJECTIVE
Past Medical History:   Diagnosis Date    ANNA (acute kidney injury) 2/22/2018    Anemia     Anxiety     Arthritis     BPH (benign prostatic hyperplasia)     CHF (congestive heart failure) 5/23/2018    Coronary artery disease of native artery of native heart with stable angina pectoris 5/21/2018    Diverticulosis     Encounter for blood transfusion     Hypertension     Urinary retention        Past Surgical History:   Procedure Laterality Date    APPENDECTOMY      COLONOSCOPY      COLONOSCOPY N/A 2/1/2018    Procedure: COLONOSCOPY;  Surgeon: Richar Payan MD;  Location: [address]);  Service: Endoscopy;  Laterality: N/A;  PM prep    EYE SURGERY Right     cataract extraction    FINGER SURGERY      FRACTURE SURGERY Right     index finger    TONSILLECTOMY         Review of patient's allergies indicates:   Allergen Reactions    Augmentin [amoxicillin-pot clavulanate]      Patient felt that it raised BP and requested to have it added as intolerance. Tolerated unasyn    Ciprofloxacin     Flagyl [metronidazole]     Lisinopril Other (See Comments)     cough    Mysoline [primidone]     Omnicef [cefdinir]     Shellfish containing products        Family History     Problem Relation (Age of Onset)    Heart disease Mother, Father    No Known Problems Sister, Brother    Stroke Mother        Social History Main Topics    Smoking status: Never Smoker    Smokeless tobacco: Never Used    Alcohol use No      Comment: none recently    Drug use: No    Sexual activity: Not Currently     Partners: Female      Review of Systems   Constitutional: Negative for activity change, appetite change, chills, fatigue and fever.   HENT: Negative for congestion, ear pain, rhinorrhea and sore throat.    Eyes: Negative for pain, discharge and visual disturbance.   Respiratory: Negative for cough, chest tightness and shortness of breath.    Cardiovascular: Negative for chest pain and palpitations.    Gastrointestinal: Negative for abdominal distention, abdominal pain, blood in stool, constipation, diarrhea, nausea and vomiting.   Genitourinary: Negative for difficulty urinating.   Musculoskeletal: Negative for back pain and neck pain.   Skin: Negative for color change and rash.   Neurological: Negative for dizziness, numbness and headaches.   Hematological: Negative for adenopathy.   Psychiatric/Behavioral: Negative.      Objective:     Vital Signs (Most Recent):  Temp: 98.3 °F (36.8 °C) (05/23/18 1900)  Pulse: 74 (05/23/18 2100)  Resp: (!) 22 (05/23/18 2100)  BP: (!) 154/72 (05/23/18 2100)  SpO2: 99 % (05/23/18 2100) Vital Signs (24h Range):  Temp:  [98.3 °F (36.8 °C)-98.7 °F (37.1 °C)] 98.3 °F (36.8 °C)  Pulse:  [72-91] 74  Resp:  [13-29] 22  SpO2:  [97 %-100 %] 99 %  BP: (135-167)/(65-83) 154/72     Weight: 65.4 kg (144 lb 2.9 oz)  Body mass index is 20.69 kg/m².      Intake/Output Summary (Last 24 hours) at 05/23/18 2136  Last data filed at 05/23/18 2100   Gross per 24 hour   Intake              220 ml   Output                2 ml   Net              218 ml       Physical Exam   Constitutional: He is oriented to person, place, and time. He appears well-developed and well-nourished.   HENT:   Head: Normocephalic and atraumatic.   Nose: Nose normal.   Eyes: Conjunctivae and EOM are normal. Pupils are equal, round, and reactive to light. No scleral icterus.   Neck: Normal range of motion. No thyromegaly present.   Cardiovascular: Normal rate and regular rhythm.    Pulmonary/Chest: Effort normal and breath sounds normal.   Abdominal: Soft. He exhibits no distension. There is no tenderness.   Musculoskeletal: Normal range of motion.   Lymphadenopathy:     He has no cervical adenopathy.   Neurological: He is alert and oriented to person, place, and time.   Skin: Skin is warm and dry. No rash noted.       Lines/Drains/Airways     Peripherally Inserted Central Catheter Line                 PICC Double Lumen  03/05/18 1539 right basilic 79 days          Peripheral Intravenous Line                 Peripheral IV - Single Lumen 05/23/18 1200 Right Forearm less than 1 day         Peripheral IV - Single Lumen 05/23/18 1700 Left Wrist less than 1 day                Significant Labs:    CBC/Anemia Profile:    Recent Labs  Lab 05/23/18  1236   WBC 5.13   HGB 11.6*   HCT 35.6*      MCV 96   RDW 14.0        Chemistries:    Recent Labs  Lab 05/23/18  1236      K 4.0      CO2 27   BUN 34*   CREATININE 1.7*   CALCIUM 10.1   ALBUMIN 4.1   PROT 7.8   BILITOT 0.8   ALKPHOS 79   ALT 24   AST 25   MG 2.1   PHOS 4.7*     Coagulation:   Recent Labs  Lab 05/23/18  1236   INR 1.0   APTT 25.9       Significant Imaging: I have reviewed all pertinent imaging results/findings within the past 24 hours.

## 2018-05-24 NOTE — SUBJECTIVE & OBJECTIVE
Past Medical History:   Diagnosis Date    ANNA (acute kidney injury) 2/22/2018    Anemia     Anxiety     Arthritis     BPH (benign prostatic hyperplasia)     CHF (congestive heart failure) 5/23/2018    Coronary artery disease of native artery of native heart with stable angina pectoris 5/21/2018    Diverticulosis     Encounter for blood transfusion     Hypertension     Urinary retention        Past Surgical History:   Procedure Laterality Date    APPENDECTOMY      COLONOSCOPY      COLONOSCOPY N/A 2/1/2018    Procedure: COLONOSCOPY;  Surgeon: Richar Payan MD;  Location: [address]);  Service: Endoscopy;  Laterality: N/A;  PM prep    EYE SURGERY Right     cataract extraction    FINGER SURGERY      FRACTURE SURGERY Right     index finger    TONSILLECTOMY         Review of patient's allergies indicates:   Allergen Reactions    Augmentin [amoxicillin-pot clavulanate]      Patient felt that it raised BP and requested to have it added as intolerance. Tolerated unasyn    Ciprofloxacin     Flagyl [metronidazole]     Lisinopril Other (See Comments)     cough    Mysoline [primidone]     Omnicef [cefdinir]     Shellfish containing products        PTA Medications   Medication Sig    albuterol-ipratropium 2.5mg-0.5mg/3mL (DUO-NEB) 0.5 mg-3 mg(2.5 mg base)/3 mL nebulizer solution 3 mLs every 4 (four) hours as needed.     aspirin (ECOTRIN) 81 MG EC tablet Take 1 tablet (81 mg total) by mouth once daily.    atorvastatin (LIPITOR) 20 MG tablet Take 2 tablets (40 mg total) by mouth once daily.    benzonatate (TESSALON) 100 MG capsule Take 100 mg by mouth 3 (three) times daily as needed for Cough.    furosemide (LASIX) 40 MG tablet Take 1 tablet (40 mg total) by mouth once daily.    loratadine (CLARITIN) 10 mg tablet Take 10 mg by mouth once daily.    mirtazapine (REMERON) 30 MG tablet Take 1 tablet (30 mg total) by mouth every evening.    pantoprazole (PROTONIX) 40 MG tablet Take 1 tablet  (40 mg total) by mouth once daily.    tamsulosin (FLOMAX) 0.4 mg Cp24 Take 2 capsules (0.8 mg total) by mouth 2 (two) times daily.    VENTOLIN HFA 90 mcg/actuation inhaler      Family History     Problem Relation (Age of Onset)    Heart disease Mother, Father    No Known Problems Sister, Brother    Stroke Mother        Social History Main Topics    Smoking status: Never Smoker    Smokeless tobacco: Never Used    Alcohol use No      Comment: none recently    Drug use: No    Sexual activity: Not Currently     Partners: Female     Review of Systems   Constitution: Negative.   HENT: Negative.    Eyes: Negative.    Cardiovascular: Positive for dyspnea on exertion and orthopnea. Negative for chest pain, claudication, cyanosis, irregular heartbeat, leg swelling, near-syncope, palpitations, paroxysmal nocturnal dyspnea and syncope.   Respiratory: Negative.    Endocrine: Negative for cold intolerance, heat intolerance, polydipsia, polyphagia and polyuria.   Genitourinary: Negative.      Objective:     Vital Signs (Most Recent):  Temp: 98.7 °F (37.1 °C) (05/24/18 0300)  Pulse: 73 (05/24/18 0600)  Resp: 12 (05/24/18 0600)  BP: (!) 113/56 (05/24/18 0600)  SpO2: 96 % (05/24/18 0600) Vital Signs (24h Range):  Temp:  [98.3 °F (36.8 °C)-98.7 °F (37.1 °C)] 98.7 °F (37.1 °C)  Pulse:  [71-91] 73  Resp:  [12-33] 12  SpO2:  [95 %-100 %] 96 %  BP: (113-167)/(55-83) 113/56     Weight: 65.4 kg (144 lb 2.9 oz)  Body mass index is 20.69 kg/m².    SpO2: 96 %  O2 Device (Oxygen Therapy): room air      Intake/Output Summary (Last 24 hours) at 05/24/18 0647  Last data filed at 05/24/18 0400   Gross per 24 hour   Intake              220 ml   Output              527 ml   Net             -307 ml       Lines/Drains/Airways     Peripherally Inserted Central Catheter Line                 PICC Double Lumen 03/05/18 1539 right basilic 79 days          Peripheral Intravenous Line                 Peripheral IV - Single Lumen 05/23/18 1200 Right  Forearm less than 1 day         Peripheral IV - Single Lumen 05/23/18 1700 Left Wrist less than 1 day                Physical Exam   Constitutional: He is oriented to person, place, and time. He appears well-developed and well-nourished.   Eyes: Conjunctivae are normal.   Neck: Neck supple. JVD present.   Cardiovascular:   Murmur heard.  High-pitched blowing holosystolic murmur is present with a grade of 3/6  at the apex  High-pitched blowing decrescendo early diastolic murmur is present with a grade of 2/6  at the upper right sternal border radiating to the apex  Pulses:       Carotid pulses are 2+ on the right side, and 2+ on the left side.       Radial pulses are 2+ on the right side, and 2+ on the left side.        Femoral pulses are 2+ on the right side, and 2+ on the left side.       Popliteal pulses are 2+ on the right side, and 2+ on the left side.        Dorsalis pedis pulses are 2+ on the right side, and 2+ on the left side.        Posterior tibial pulses are 2+ on the right side, and 2+ on the left side.   Pulmonary/Chest: Effort normal. No respiratory distress. He has no wheezes. He has rales. He exhibits no tenderness.   Abdominal: Soft. Bowel sounds are normal. He exhibits no distension. There is no tenderness. There is no rebound and no guarding.   Neurological: He is oriented to person, place, and time.   Skin: Skin is warm.       Significant Labs:   BMP:   Recent Labs  Lab 05/23/18  1236 05/24/18  0300   GLU 98 103    142   K 4.0 3.6    109   CO2 27 26   BUN 34* 34*   CREATININE 1.7* 1.4   CALCIUM 10.1 8.9   MG 2.1 2.1   , CMP   Recent Labs  Lab 05/23/18  1236 05/24/18  0300    142   K 4.0 3.6    109   CO2 27 26   GLU 98 103   BUN 34* 34*   CREATININE 1.7* 1.4   CALCIUM 10.1 8.9   PROT 7.8 6.1   ALBUMIN 4.1 3.3*   BILITOT 0.8 0.5   ALKPHOS 79 62   AST 25 16   ALT 24 19   ANIONGAP 10 7*   ESTGFRAFRICA 47.2* 59.7*   EGFRNONAA 40.8* 51.6*   , CBC   Recent Labs  Lab  05/23/18  1236 05/24/18  0300   WBC 5.13 4.72   HGB 11.6* 9.4*   HCT 35.6* 27.9*    130*   , Troponin No results for input(s): TROPONINI in the last 48 hours., All pertinent lab results from the last 24 hours have been reviewed. and None    Significant Imaging: Echocardiogram:   2D echo with color flow doppler:   Results for orders placed or performed during the hospital encounter of 04/16/18   2D Echo w/ Color Flow Doppler   Result Value Ref Range    EF 60 55 - 65    Mitral Valve Regurgitation SEVERE (A)     Aortic Valve Regurgitation MILD     Est. PA Systolic Pressure 85.44 (A)     Mitral Valve Mobility NORMAL

## 2018-05-24 NOTE — HPI
Mr. Messina is a 66yo M with a h/o mitral valve endocarditis and resultant severe MR, TR and pulmonary HTN. He is admitted under CTS service for  MVR, TV repair, AVR and CABG on Friday 5/25. He currently denies angina.  He denies exertional dyspnea.  He repots 1+ bilateral LE edema, but denies orthopnea or PND.  He denies palpitations, syncope, or near sycnope. The patient report that the etiology of his endocarditis remains unknown; however he is under the impression that it may be related to a urologic procedure. The pateint denies current faver/ chills. IC was consulted for pre-operatively for IABP placement today before his valve/heart surgery tomorrow.

## 2018-05-24 NOTE — ASSESSMENT & PLAN NOTE
68yo M with PMH of mitral valve endocarditis and resultant severe MR, TR and pulmonary HTN who is admitted to the SICU pre-operatively for IABP placement tomorrow (5/24) and has MVR, TV repair, AVR and CABG planned for Friday 5/25.    Plan:    Neuro:   -AAO x 3, no sedation required  -Pre-op, denies pain     Pulmonary:   -Extubated, saturating well on room air  -Continuous pulse ox  -Supplemental O2 prn    Cardiac:  -MAP goal >65  -HDS not requiring pressors   -Continuous cardiac monitoring    Renal:   -UOP adequate  -Flomax daily given home use  -Bun/Cr 34/1.4 (baseline Cr 1.4-1.6)    Fluids/Electrolytes/Nutrition/GI:   -Nutritional status: NPO since midnight for procedure today  -replace lytes PRN    Hematology/Oncology:  -H/H 9.4/27.9, continue to monitor  -INR/Plts 1.1/130  -CBC daily    Infectious Disease:   -Afebrile  -WBC 4.72  -CBC daily   -Abx: none indicated currently    Endocrine:  - No h/o DM or thyroid dysfunction, continue to monitor  - Glucose goal of 120-180    Dispo:  -Continue care in the ICU setting  -CTS primary

## 2018-05-24 NOTE — CONSULTS
[facility]  Interventional Cardiology  Consult Note    Patient Name: Rodolfo Messina  MRN: [id number]  Admission Date: 5/23/2018  Hospital Length of Stay: 1 days  Code Status: Full Code   Attending Provider: Marvin Go MD  Consulting Provider: Johnnie Simms MD  Primary Care Physician: Deric Claudio MD  Principal Problem:<principal problem not specified>    Patient information was obtained from patient and ER records.     Inpatient consult to Interventional Cardiology  Consult performed by: JOHNNIE SIMMS  Consult ordered by: LOULOU CUMMINGS  Reason for consult: IABP insertion        Subjective:     Chief Complaint:  IABP insertion pre IABP     HPI:  Mr. Messina is a 66yo M with a h/o mitral valve endocarditis and resultant severe MR, TR and pulmonary HTN. He is admitted under CTS service for  MVR, TV repair, AVR and CABG on Friday 5/25. He currently denies angina.  He denies exertional dyspnea.  He repots 1+ bilateral LE edema, but denies orthopnea or PND.  He denies palpitations, syncope, or near sycnope. The patient report that the etiology of his endocarditis remains unknown; however he is under the impression that it may be related to a urologic procedure. The pateint denies current faver/ chills. IC was consulted for pre-operatively for IABP placement today before his valve/heart surgery tomorrow.     Past Medical History:   Diagnosis Date    ANNA (acute kidney injury) 2/22/2018    Anemia     Anxiety     Arthritis     BPH (benign prostatic hyperplasia)     CHF (congestive heart failure) 5/23/2018    Coronary artery disease of native artery of native heart with stable angina pectoris 5/21/2018    Diverticulosis     Encounter for blood transfusion     Hypertension     Urinary retention        Past Surgical History:   Procedure Laterality Date    APPENDECTOMY      COLONOSCOPY      COLONOSCOPY N/A 2/1/2018    Procedure: COLONOSCOPY;  Surgeon: Richar MCCORD  MD Schuyler;  [facility]  Service: Endoscopy;  Laterality: N/A;  PM prep    EYE SURGERY Right     cataract extraction    FINGER SURGERY      FRACTURE SURGERY Right     index finger    TONSILLECTOMY         Review of patient's allergies indicates:   Allergen Reactions    Augmentin [amoxicillin-pot clavulanate]      Patient felt that it raised BP and requested to have it added as intolerance. Tolerated unasyn    Ciprofloxacin     Flagyl [metronidazole]     Lisinopril Other (See Comments)     cough    Mysoline [primidone]     Omnicef [cefdinir]     Shellfish containing products        PTA Medications   Medication Sig    albuterol-ipratropium 2.5mg-0.5mg/3mL (DUO-NEB) 0.5 mg-3 mg(2.5 mg base)/3 mL nebulizer solution 3 mLs every 4 (four) hours as needed.     aspirin (ECOTRIN) 81 MG EC tablet Take 1 tablet (81 mg total) by mouth once daily.    atorvastatin (LIPITOR) 20 MG tablet Take 2 tablets (40 mg total) by mouth once daily.    benzonatate (TESSALON) 100 MG capsule Take 100 mg by mouth 3 (three) times daily as needed for Cough.    furosemide (LASIX) 40 MG tablet Take 1 tablet (40 mg total) by mouth once daily.    loratadine (CLARITIN) 10 mg tablet Take 10 mg by mouth once daily.    mirtazapine (REMERON) 30 MG tablet Take 1 tablet (30 mg total) by mouth every evening.    pantoprazole (PROTONIX) 40 MG tablet Take 1 tablet (40 mg total) by mouth once daily.    tamsulosin (FLOMAX) 0.4 mg Cp24 Take 2 capsules (0.8 mg total) by mouth 2 (two) times daily.    VENTOLIN HFA 90 mcg/actuation inhaler      Family History     Problem Relation (Age of Onset)    Heart disease Mother, Father    No Known Problems Sister, Brother    Stroke Mother        Social History Main Topics    Smoking status: Never Smoker    Smokeless tobacco: Never Used    Alcohol use No      Comment: none recently    Drug use: No    Sexual activity: Not Currently     Partners: Female     Review of Systems    Constitution: Negative.   HENT: Negative.    Eyes: Negative.    Cardiovascular: Positive for dyspnea on exertion and orthopnea. Negative for chest pain, claudication, cyanosis, irregular heartbeat, leg swelling, near-syncope, palpitations, paroxysmal nocturnal dyspnea and syncope.   Respiratory: Negative.    Endocrine: Negative for cold intolerance, heat intolerance, polydipsia, polyphagia and polyuria.   Genitourinary: Negative.      Objective:     Vital Signs (Most Recent):  Temp: 98.7 °F (37.1 °C) (05/24/18 0300)  Pulse: 73 (05/24/18 0600)  Resp: 12 (05/24/18 0600)  BP: (!) 113/56 (05/24/18 0600)  SpO2: 96 % (05/24/18 0600) Vital Signs (24h Range):  Temp:  [98.3 °F (36.8 °C)-98.7 °F (37.1 °C)] 98.7 °F (37.1 °C)  Pulse:  [71-91] 73  Resp:  [12-33] 12  SpO2:  [95 %-100 %] 96 %  BP: (113-167)/(55-83) 113/56     Weight: 65.4 kg (144 lb 2.9 oz)  Body mass index is 20.69 kg/m².    SpO2: 96 %  O2 Device (Oxygen Therapy): room air      Intake/Output Summary (Last 24 hours) at 05/24/18 0647  Last data filed at 05/24/18 0400   Gross per 24 hour   Intake              220 ml   Output              527 ml   Net             -307 ml       Lines/Drains/Airways     Peripherally Inserted Central Catheter Line                 PICC Double Lumen 03/05/18 1539 right basilic 79 days          Peripheral Intravenous Line                 Peripheral IV - Single Lumen 05/23/18 1200 Right Forearm less than 1 day         Peripheral IV - Single Lumen 05/23/18 1700 Left Wrist less than 1 day                Physical Exam   Constitutional: He is oriented to person, place, and time. He appears well-developed and well-nourished.   Eyes: Conjunctivae are normal.   Neck: Neck supple. JVD present.   Cardiovascular:   Murmur heard.  High-pitched blowing holosystolic murmur is present with a grade of 3/6  at the apex  High-pitched blowing decrescendo early diastolic murmur is present with a grade of 2/6  at the upper right sternal border radiating to  the apex  Pulses:       Carotid pulses are 2+ on the right side, and 2+ on the left side.       Radial pulses are 2+ on the right side, and 2+ on the left side.        Femoral pulses are 2+ on the right side, and 2+ on the left side.       Popliteal pulses are 2+ on the right side, and 2+ on the left side.        Dorsalis pedis pulses are 2+ on the right side, and 2+ on the left side.        Posterior tibial pulses are 2+ on the right side, and 2+ on the left side.   Pulmonary/Chest: Effort normal. No respiratory distress. He has no wheezes. He has rales. He exhibits no tenderness.   Abdominal: Soft. Bowel sounds are normal. He exhibits no distension. There is no tenderness. There is no rebound and no guarding.   Neurological: He is oriented to person, place, and time.   Skin: Skin is warm.       Significant Labs:   BMP:   Recent Labs  Lab 05/23/18  1236 05/24/18  0300   GLU 98 103    142   K 4.0 3.6    109   CO2 27 26   BUN 34* 34*   CREATININE 1.7* 1.4   CALCIUM 10.1 8.9   MG 2.1 2.1   , CMP   Recent Labs  Lab 05/23/18  1236 05/24/18  0300    142   K 4.0 3.6    109   CO2 27 26   GLU 98 103   BUN 34* 34*   CREATININE 1.7* 1.4   CALCIUM 10.1 8.9   PROT 7.8 6.1   ALBUMIN 4.1 3.3*   BILITOT 0.8 0.5   ALKPHOS 79 62   AST 25 16   ALT 24 19   ANIONGAP 10 7*   ESTGFRAFRICA 47.2* 59.7*   EGFRNONAA 40.8* 51.6*   , CBC   Recent Labs  Lab 05/23/18  1236 05/24/18  0300   WBC 5.13 4.72   HGB 11.6* 9.4*   HCT 35.6* 27.9*    130*   , Troponin No results for input(s): TROPONINI in the last 48 hours., All pertinent lab results from the last 24 hours have been reviewed. and None    Significant Imaging: Echocardiogram:   2D echo with color flow doppler:   Results for orders placed or performed during the hospital encounter of 04/16/18   2D Echo w/ Color Flow Doppler   Result Value Ref Range    EF 60 55 - 65    Mitral Valve Regurgitation SEVERE (A)     Aortic Valve Regurgitation MILD     Est. PA Systolic  Pressure 85.44 (A)     Mitral Valve Mobility NORMAL      Assessment and Plan:     CHF (congestive heart failure)    -IABP placement via R CFA  -The risks, benefits & alternatives of the procedure were explained to the patient.    -The risks of IABP insertion include but are not limited to:  Bleeding, infection, heart rhythm abnormalities, allergic reactions, kidney injury, stroke and death.    -The risks of moderate sedation include hypotension, respiratory depression, arrhythmias, bronchospasm, & death.    -Informed consent was obtained & the patient is agreeable to proceed with the procedure.                VTE Risk Mitigation         Ordered     Place sequential compression device  Until discontinued      05/23/18 1324     IP VTE HIGH RISK PATIENT  Once      05/23/18 1323          Thank you for your consult. I will sign off. Please contact us if you have any additional questions.    Johnnie Simms MD  Interventional Cardiology   [facility]

## 2018-05-24 NOTE — PLAN OF CARE
Problem: Patient Care Overview  Goal: Plan of Care Review  Hx:  Mitral regurg (severe), Aortic regurg (mild), Pulm HTN, HTN   Outcome: Ongoing (interventions implemented as appropriate)  POC reviewed with patient and spouse who verbalized understanding. Pt has been NPO since midnight. Pt denies pain. Pt getting up independently and using urinal, urine output has been adequate. Pt remained free from falls throughout the shift VSS. No distress noted, will continue to monitor.

## 2018-05-24 NOTE — SUBJECTIVE & OBJECTIVE
No current facility-administered medications on file prior to encounter.      Current Outpatient Prescriptions on File Prior to Encounter   Medication Sig    albuterol-ipratropium 2.5mg-0.5mg/3mL (DUO-NEB) 0.5 mg-3 mg(2.5 mg base)/3 mL nebulizer solution 3 mLs every 4 (four) hours as needed.     aspirin (ECOTRIN) 81 MG EC tablet Take 1 tablet (81 mg total) by mouth once daily.    atorvastatin (LIPITOR) 20 MG tablet Take 2 tablets (40 mg total) by mouth once daily.    benzonatate (TESSALON) 100 MG capsule Take 100 mg by mouth 3 (three) times daily as needed for Cough.    furosemide (LASIX) 40 MG tablet Take 1 tablet (40 mg total) by mouth once daily.    loratadine (CLARITIN) 10 mg tablet Take 10 mg by mouth once daily.    mirtazapine (REMERON) 30 MG tablet Take 1 tablet (30 mg total) by mouth every evening.    pantoprazole (PROTONIX) 40 MG tablet Take 1 tablet (40 mg total) by mouth once daily.    tamsulosin (FLOMAX) 0.4 mg Cp24 Take 2 capsules (0.8 mg total) by mouth 2 (two) times daily.    VENTOLIN HFA 90 mcg/actuation inhaler        Review of patient's allergies indicates:   Allergen Reactions    Augmentin [amoxicillin-pot clavulanate]      Patient felt that it raised BP and requested to have it added as intolerance. Tolerated unasyn    Ciprofloxacin     Flagyl [metronidazole]     Lisinopril Other (See Comments)     cough    Mysoline [primidone]     Omnicef [cefdinir]     Shellfish containing products        Past Medical History:   Diagnosis Date    ANNA (acute kidney injury) 2/22/2018    Anemia     Anxiety     Arthritis     BPH (benign prostatic hyperplasia)     CHF (congestive heart failure) 5/23/2018    Coronary artery disease of native artery of native heart with stable angina pectoris 5/21/2018    Diverticulosis     Encounter for blood transfusion     Hypertension     Urinary retention      Past Surgical History:   Procedure Laterality Date    APPENDECTOMY      COLONOSCOPY       COLONOSCOPY N/A 2/1/2018    Procedure: COLONOSCOPY;  Surgeon: Richar Payan MD;  Location: [facility] ([address] [place] [address]);  Service: Endoscopy;  Laterality: N/A;  PM prep    EYE SURGERY Right     cataract extraction    FINGER SURGERY      FRACTURE SURGERY Right     index finger    TONSILLECTOMY       Family History     Problem Relation (Age of Onset)    Heart disease Mother, Father    No Known Problems Sister, Brother    Stroke Mother        Social History Main Topics    Smoking status: Never Smoker    Smokeless tobacco: Never Used    Alcohol use No      Comment: none recently    Drug use: No    Sexual activity: Not Currently     Partners: Female     Review of Systems   Constitutional: Negative for chills and fever.   Respiratory: Positive for shortness of breath.    Cardiovascular: Negative for chest pain and palpitations.   Gastrointestinal: Negative for abdominal pain.   Neurological: Negative for seizures.     Objective:     Vital Signs (Most Recent):  Temp: 98.7 °F (37.1 °C) (05/24/18 0300)  Pulse: 71 (05/24/18 0545)  Resp: 12 (05/24/18 0545)  BP: (!) 119/58 (05/24/18 0500)  SpO2: 96 % (05/24/18 0545) Vital Signs (24h Range):  Temp:  [98.3 °F (36.8 °C)-98.7 °F (37.1 °C)] 98.7 °F (37.1 °C)  Pulse:  [71-91] 71  Resp:  [12-33] 12  SpO2:  [95 %-100 %] 96 %  BP: (113-167)/(55-83) 119/58     Weight: 65.4 kg (144 lb 2.9 oz)  Body mass index is 20.69 kg/m².    SpO2: 96 %  O2 Device (Oxygen Therapy): room air     Intake/Output - Last 3 Shifts       05/22 0700 - 05/23 0659 05/23 0700 - 05/24 0659    P.O.  220    Total Intake(mL/kg)  220 (3.4)    Urine (mL/kg/hr)  526    Stool  1    Total Output   527    Net   -307          Urine Occurrence  375 x           Lines/Drains/Airways     Peripherally Inserted Central Catheter Line                 PICC Double Lumen 03/05/18 1539 right basilic 79 days          Peripheral Intravenous Line                 Peripheral IV - Single Lumen 05/23/18 1200 Right Forearm less than 1 day          Peripheral IV - Single Lumen 05/23/18 1700 Left Wrist less than 1 day                   Physical Exam   Constitutional: He appears well-developed.   Temporal wasting   Cardiovascular: Normal rate and regular rhythm.    Murmur heard.  Pulmonary/Chest: Effort normal.   Neurological: He is alert.   Skin: Skin is warm and dry.       Significant Labs:  CBC:   Recent Labs  Lab 05/24/18  0300   WBC 4.72   RBC 2.97*   HGB 9.4*   HCT 27.9*   *   MCV 94   MCH 31.6*   MCHC 33.7     CMP:   Recent Labs  Lab 05/24/18  0300      CALCIUM 8.9   ALBUMIN 3.3*   PROT 6.1      K 3.6   CO2 26      BUN 34*   CREATININE 1.4   ALKPHOS 62   ALT 19   AST 16   BILITOT 0.5

## 2018-05-24 NOTE — PROGRESS NOTES
[facility]  Cardiothoracic Surgery  Progress Note    Patient Name: Rodolfo Messina  MRN: [id number]  Admission Date: 5/23/2018  Hospital Length of Stay: 1 days  Code Status: Full Code   Attending Physician: Marvin Go MD   Referring Provider: Marvin Go MD  Principal Problem:<principal problem not specified>    Subjective:     Post-Op Info:  Procedure(s) (LRB):  INSERTION, INTRA-AORTIC BALLOON PUMP (Left)         Interval History: NAEON. Doing well this am. Plan for IABP placement today.    Medications:  Continuous Infusions:   heparin (porcine) in 5 % dex       Scheduled Meds:  PRN Meds:sodium chloride, sodium chloride, sodium chloride, magnesium oxide, magnesium oxide, potassium chloride 10%, potassium chloride 10%, potassium chloride 10%, potassium, sodium phosphates, potassium, sodium phosphates, potassium, sodium phosphates, sodium chloride 0.9%     Objective:     Vital Signs (Most Recent):  Temp: 97.5 °F (36.4 °C) (05/24/18 1143)  Pulse: 73 (05/24/18 1143)  Resp: 12 (05/24/18 1143)  BP: 137/67 (05/24/18 1500)  SpO2: 99 % (05/24/18 1600) Vital Signs (24h Range):  Temp:  [97.5 °F (36.4 °C)-98.7 °F (37.1 °C)] 97.5 °F (36.4 °C)  Pulse:  [71-91] 73  Resp:  [12-33] 12  SpO2:  [95 %-99 %] 99 %  BP: (113-164)/(55-96) 137/67     Weight: 65.4 kg (144 lb 2.9 oz)  Body mass index is 20.69 kg/m².    SpO2: 99 %  O2 Device (Oxygen Therapy): room air    Intake/Output - Last 3 Shifts       05/22 0700 - 05/23 0659 05/23 0700 - 05/24 0659 05/24 0700 - 05/25 0659    P.O.  220     Total Intake(mL/kg)  220 (3.4)     Urine (mL/kg/hr)  526     Stool  1     Total Output   527      Net   -307             Urine Occurrence  375 x           Lines/Drains/Airways     Peripherally Inserted Central Catheter Line                 PICC Double Lumen 03/05/18 1539 right basilic 80 days          Line                 IABP 05/24/18 1115 7.5 Fr. 40 mL less than 1 day          Peripheral Intravenous Line                  Peripheral IV - Single Lumen 05/23/18 1200 Right Forearm 1 day         Peripheral IV - Single Lumen 05/23/18 1700 Left Wrist 1 day                Physical Exam   Constitutional: He appears well-developed.   Cardiovascular: Normal rate and regular rhythm.    Pulmonary/Chest: Effort normal.   Skin: Skin is warm and dry.       Significant Labs:  CBC:   Recent Labs  Lab 05/24/18  0300   WBC 4.72   RBC 2.97*   HGB 9.4*   HCT 27.9*   *   MCV 94   MCH 31.6*   MCHC 33.7     CMP:   Recent Labs  Lab 05/24/18  0300      CALCIUM 8.9   ALBUMIN 3.3*   PROT 6.1      K 3.6   CO2 26      BUN 34*   CREATININE 1.4   ALKPHOS 62   ALT 19   AST 16   BILITOT 0.5     Assessment/Plan:     CHF (congestive heart failure)    - Plan for IABP 5/24/18 prior to surgery  - NPO for procedure  - Daily labs  - Plan for surgery 5/25/18            Lupe Hawkins MD  Cardiothoracic Surgery  [facility]

## 2018-05-24 NOTE — NURSING
Surgery pre-op team at the bedside for transport to pre-op/surgery area. Mr Messina is awake, alert, and oriented. Transferred from sofa to bed independently. Placed on ICU portable monitor. Spouse at the bedside. Transfer free of pain or discomfort. NAD.   VSS. HR 70-80 SR. 97% room air. /78. Opportunity for questions provided. Transition without difficulty.

## 2018-05-25 ENCOUNTER — ANESTHESIA (OUTPATIENT)
Dept: SURGERY | Facility: HOSPITAL | Age: 68
DRG: 219 | End: 2018-05-25
Payer: MEDICARE

## 2018-05-25 PROBLEM — Z95.1 S/P CABG X 1: Status: ACTIVE | Noted: 2018-05-25

## 2018-05-25 PROBLEM — I33.0 SUBACUTE BACTERIAL ENDOCARDITIS: Status: RESOLVED | Noted: 2018-02-27 | Resolved: 2018-05-25

## 2018-05-25 PROBLEM — R33.9 URINARY RETENTION WITH INCOMPLETE BLADDER EMPTYING: Status: RESOLVED | Noted: 2018-03-06 | Resolved: 2018-05-25

## 2018-05-25 PROBLEM — M54.50 ACUTE RIGHT-SIDED LOW BACK PAIN WITHOUT SCIATICA: Status: RESOLVED | Noted: 2018-01-14 | Resolved: 2018-05-25

## 2018-05-25 PROBLEM — N17.9 AKI (ACUTE KIDNEY INJURY): Status: RESOLVED | Noted: 2018-02-22 | Resolved: 2018-05-25

## 2018-05-25 PROBLEM — I34.0 NON-RHEUMATIC MITRAL REGURGITATION: Status: RESOLVED | Noted: 2018-05-08 | Resolved: 2018-05-25

## 2018-05-25 PROBLEM — I05.9 ENDOCARDITIS OF MITRAL VALVE: Status: RESOLVED | Noted: 2018-02-28 | Resolved: 2018-05-25

## 2018-05-25 PROBLEM — I08.0 MITRAL AND AORTIC VALVE REGURGITATION: Status: RESOLVED | Noted: 2018-02-27 | Resolved: 2018-05-25

## 2018-05-25 PROBLEM — I50.22 CHRONIC SYSTOLIC CONGESTIVE HEART FAILURE: Status: ACTIVE | Noted: 2018-05-23

## 2018-05-25 PROBLEM — Z12.11 COLON CANCER SCREENING: Status: RESOLVED | Noted: 2018-02-01 | Resolved: 2018-05-25

## 2018-05-25 PROBLEM — R05.3 CHRONIC COUGH: Status: RESOLVED | Noted: 2018-02-22 | Resolved: 2018-05-25

## 2018-05-25 PROBLEM — I38 ENDOCARDITIS: Status: RESOLVED | Noted: 2018-02-27 | Resolved: 2018-05-25

## 2018-05-25 PROBLEM — R60.0 LOWER EXTREMITY EDEMA: Status: RESOLVED | Noted: 2018-03-25 | Resolved: 2018-05-25

## 2018-05-25 PROBLEM — I35.8 AORTIC VALVE ENDOCARDITIS: Status: RESOLVED | Noted: 2018-02-28 | Resolved: 2018-05-25

## 2018-05-25 PROBLEM — R10.84 GENERALIZED ABDOMINAL PAIN: Status: RESOLVED | Noted: 2018-01-14 | Resolved: 2018-05-25

## 2018-05-25 PROBLEM — R73.9 ACUTE HYPERGLYCEMIA: Status: ACTIVE | Noted: 2018-05-25

## 2018-05-25 PROBLEM — D64.9 ANEMIA: Status: RESOLVED | Noted: 2018-01-27 | Resolved: 2018-05-25

## 2018-05-25 PROBLEM — Z95.2 S/P MVR (MITRAL VALVE REPLACEMENT): Status: ACTIVE | Noted: 2018-05-25

## 2018-05-25 PROBLEM — I50.1 ACUTE CARDIOGENIC PULMONARY EDEMA: Status: RESOLVED | Noted: 2018-02-27 | Resolved: 2018-05-25

## 2018-05-25 LAB
ALBUMIN SERPL BCP-MCNC: 3.4 G/DL
ALBUMIN SERPL BCP-MCNC: 3.7 G/DL
ALLENS TEST: ABNORMAL
ALP SERPL-CCNC: 51 U/L
ALP SERPL-CCNC: 72 U/L
ALT SERPL W/O P-5'-P-CCNC: 15 U/L
ALT SERPL W/O P-5'-P-CCNC: 19 U/L
ANION GAP SERPL CALC-SCNC: 12 MMOL/L
ANION GAP SERPL CALC-SCNC: 13 MMOL/L
ANION GAP SERPL CALC-SCNC: 7 MMOL/L
ANISOCYTOSIS BLD QL SMEAR: SLIGHT
APTT BLDCRRT: 30.8 SEC
APTT BLDCRRT: 41.4 SEC
APTT BLDCRRT: <21 SEC
AST SERPL-CCNC: 28 U/L
AST SERPL-CCNC: 77 U/L
BASOPHILS # BLD AUTO: 0.02 K/UL
BASOPHILS # BLD AUTO: 0.02 K/UL
BASOPHILS # BLD AUTO: 0.04 K/UL
BASOPHILS # BLD AUTO: 0.04 K/UL
BASOPHILS NFR BLD: 0.2 %
BASOPHILS NFR BLD: 0.2 %
BASOPHILS NFR BLD: 0.6 %
BASOPHILS NFR BLD: 0.6 %
BILIRUB SERPL-MCNC: 0.8 MG/DL
BILIRUB SERPL-MCNC: 1.5 MG/DL
BLD PROD TYP BPU: NORMAL
BLD PROD TYP BPU: NORMAL
BLOOD UNIT EXPIRATION DATE: NORMAL
BLOOD UNIT EXPIRATION DATE: NORMAL
BLOOD UNIT TYPE CODE: 6200
BLOOD UNIT TYPE CODE: 6200
BLOOD UNIT TYPE: NORMAL
BLOOD UNIT TYPE: NORMAL
BUN SERPL-MCNC: 29 MG/DL
BUN SERPL-MCNC: 29 MG/DL
BUN SERPL-MCNC: 31 MG/DL
CALCIUM SERPL-MCNC: 8.8 MG/DL
CALCIUM SERPL-MCNC: 8.8 MG/DL
CALCIUM SERPL-MCNC: 9.3 MG/DL
CHLORIDE SERPL-SCNC: 108 MMOL/L
CHLORIDE SERPL-SCNC: 112 MMOL/L
CHLORIDE SERPL-SCNC: 112 MMOL/L
CO2 SERPL-SCNC: 20 MMOL/L
CO2 SERPL-SCNC: 21 MMOL/L
CO2 SERPL-SCNC: 24 MMOL/L
CODING SYSTEM: NORMAL
CODING SYSTEM: NORMAL
CREAT SERPL-MCNC: 1.3 MG/DL
CREAT SERPL-MCNC: 1.6 MG/DL
CREAT SERPL-MCNC: 1.6 MG/DL
DELSYS: ABNORMAL
DIFFERENTIAL METHOD: ABNORMAL
DISPENSE STATUS: NORMAL
DISPENSE STATUS: NORMAL
EOSINOPHIL # BLD AUTO: 0 K/UL
EOSINOPHIL # BLD AUTO: 0 K/UL
EOSINOPHIL # BLD AUTO: 0.3 K/UL
EOSINOPHIL # BLD AUTO: 0.3 K/UL
EOSINOPHIL NFR BLD: 0 %
EOSINOPHIL NFR BLD: 0.2 %
EOSINOPHIL NFR BLD: 4.3 %
EOSINOPHIL NFR BLD: 4.3 %
ERYTHROCYTE [DISTWIDTH] IN BLOOD BY AUTOMATED COUNT: 13.6 %
ERYTHROCYTE [DISTWIDTH] IN BLOOD BY AUTOMATED COUNT: 13.6 %
ERYTHROCYTE [DISTWIDTH] IN BLOOD BY AUTOMATED COUNT: 14.3 %
ERYTHROCYTE [DISTWIDTH] IN BLOOD BY AUTOMATED COUNT: 14.6 %
ERYTHROCYTE [SEDIMENTATION RATE] IN BLOOD BY WESTERGREN METHOD: 16 MM/H
ERYTHROCYTE [SEDIMENTATION RATE] IN BLOOD BY WESTERGREN METHOD: 16 MM/H
ERYTHROCYTE [SEDIMENTATION RATE] IN BLOOD BY WESTERGREN METHOD: 18 MM/H
EST. GFR  (AFRICAN AMERICAN): 50.8 ML/MIN/1.73 M^2
EST. GFR  (AFRICAN AMERICAN): 50.8 ML/MIN/1.73 M^2
EST. GFR  (AFRICAN AMERICAN): >60 ML/MIN/1.73 M^2
EST. GFR  (NON AFRICAN AMERICAN): 43.9 ML/MIN/1.73 M^2
EST. GFR  (NON AFRICAN AMERICAN): 43.9 ML/MIN/1.73 M^2
EST. GFR  (NON AFRICAN AMERICAN): 56.5 ML/MIN/1.73 M^2
FACT X PPP CHRO-ACNC: 0.25 IU/ML
FIBRINOGEN PPP-MCNC: 249 MG/DL
FIBRINOGEN PPP-MCNC: 249 MG/DL
FIBRINOGEN PPP-MCNC: 335 MG/DL
FIO2: 100
FIO2: 100
FIO2: 50
FIO2: 60
FIO2: 60
FIO2: 65
FIO2: 70
FIO2: 70
FLOW: 60
FLOW: 60
GLUCOSE SERPL-MCNC: 128 MG/DL
GLUCOSE SERPL-MCNC: 133 MG/DL
GLUCOSE SERPL-MCNC: 160 MG/DL
GLUCOSE SERPL-MCNC: 189 MG/DL (ref 70–110)
GLUCOSE SERPL-MCNC: 202 MG/DL (ref 70–110)
GLUCOSE SERPL-MCNC: 221 MG/DL (ref 70–110)
GLUCOSE SERPL-MCNC: 229 MG/DL (ref 70–110)
GLUCOSE SERPL-MCNC: 234 MG/DL (ref 70–110)
HCO3 UR-SCNC: 19.9 MMOL/L (ref 24–28)
HCO3 UR-SCNC: 20.7 MMOL/L (ref 24–28)
HCO3 UR-SCNC: 21.5 MMOL/L (ref 24–28)
HCO3 UR-SCNC: 21.6 MMOL/L (ref 24–28)
HCO3 UR-SCNC: 21.7 MMOL/L (ref 24–28)
HCO3 UR-SCNC: 24.9 MMOL/L (ref 24–28)
HCO3 UR-SCNC: 25.2 MMOL/L (ref 24–28)
HCO3 UR-SCNC: 25.8 MMOL/L (ref 24–28)
HCO3 UR-SCNC: 26.2 MMOL/L (ref 24–28)
HCO3 UR-SCNC: 28.1 MMOL/L (ref 24–28)
HCT VFR BLD AUTO: 28.5 %
HCT VFR BLD AUTO: 29.4 %
HCT VFR BLD AUTO: 34.3 %
HCT VFR BLD AUTO: 34.3 %
HCT VFR BLD CALC: 20 %PCV (ref 36–54)
HCT VFR BLD CALC: 21 %PCV (ref 36–54)
HCT VFR BLD CALC: 23 %PCV (ref 36–54)
HCT VFR BLD CALC: 26 %PCV (ref 36–54)
HGB BLD-MCNC: 11.3 G/DL
HGB BLD-MCNC: 11.3 G/DL
HGB BLD-MCNC: 9.5 G/DL
HGB BLD-MCNC: 9.5 G/DL
HYPOCHROMIA BLD QL SMEAR: ABNORMAL
IMM GRANULOCYTES # BLD AUTO: 0.02 K/UL
IMM GRANULOCYTES # BLD AUTO: 0.02 K/UL
IMM GRANULOCYTES # BLD AUTO: 0.03 K/UL
IMM GRANULOCYTES # BLD AUTO: 0.06 K/UL
IMM GRANULOCYTES NFR BLD AUTO: 0.3 %
IMM GRANULOCYTES NFR BLD AUTO: 0.5 %
INR PPP: 1.1
INR PPP: 1.1
INR PPP: 1.2
LDH SERPL L TO P-CCNC: 4.89 MMOL/L (ref 0.36–1.25)
LDH SERPL L TO P-CCNC: 5.55 MMOL/L (ref 0.36–1.25)
LDH SERPL L TO P-CCNC: 6.08 MMOL/L (ref 0.36–1.25)
LDH SERPL L TO P-CCNC: 6.57 MMOL/L (ref 0.36–1.25)
LDH SERPL L TO P-CCNC: 7.05 MMOL/L (ref 0.36–1.25)
LYMPHOCYTES # BLD AUTO: 0.4 K/UL
LYMPHOCYTES # BLD AUTO: 0.8 K/UL
LYMPHOCYTES # BLD AUTO: 1.1 K/UL
LYMPHOCYTES # BLD AUTO: 1.1 K/UL
LYMPHOCYTES NFR BLD: 15.7 %
LYMPHOCYTES NFR BLD: 15.7 %
LYMPHOCYTES NFR BLD: 4.3 %
LYMPHOCYTES NFR BLD: 6.5 %
MAGNESIUM SERPL-MCNC: 2.3 MG/DL
MAGNESIUM SERPL-MCNC: 2.8 MG/DL
MAGNESIUM SERPL-MCNC: 2.9 MG/DL
MCH RBC QN AUTO: 30.4 PG
MCH RBC QN AUTO: 30.8 PG
MCH RBC QN AUTO: 31 PG
MCH RBC QN AUTO: 31 PG
MCHC RBC AUTO-ENTMCNC: 32.3 G/DL
MCHC RBC AUTO-ENTMCNC: 32.9 G/DL
MCHC RBC AUTO-ENTMCNC: 32.9 G/DL
MCHC RBC AUTO-ENTMCNC: 33.3 G/DL
MCV RBC AUTO: 93 FL
MCV RBC AUTO: 94 FL
MODE: ABNORMAL
MONOCYTES # BLD AUTO: 0.5 K/UL
MONOCYTES # BLD AUTO: 0.5 K/UL
MONOCYTES # BLD AUTO: 0.7 K/UL
MONOCYTES # BLD AUTO: 0.7 K/UL
MONOCYTES NFR BLD: 5.2 %
MONOCYTES NFR BLD: 6.6 %
MONOCYTES NFR BLD: 6.6 %
MONOCYTES NFR BLD: 7.6 %
NEUTROPHILS # BLD AUTO: 11 K/UL
NEUTROPHILS # BLD AUTO: 5 K/UL
NEUTROPHILS # BLD AUTO: 5 K/UL
NEUTROPHILS # BLD AUTO: 7.7 K/UL
NEUTROPHILS NFR BLD: 72.5 %
NEUTROPHILS NFR BLD: 72.5 %
NEUTROPHILS NFR BLD: 87.4 %
NEUTROPHILS NFR BLD: 87.6 %
NRBC BLD-RTO: 0 /100 WBC
NUM UNITS TRANS FFP: NORMAL
NUM UNITS TRANS FFP: NORMAL
OVALOCYTES BLD QL SMEAR: ABNORMAL
PCO2 BLDA: 35.2 MMHG (ref 35–45)
PCO2 BLDA: 35.9 MMHG (ref 35–45)
PCO2 BLDA: 37.3 MMHG (ref 35–45)
PCO2 BLDA: 39.2 MMHG (ref 35–45)
PCO2 BLDA: 40.2 MMHG (ref 35–45)
PCO2 BLDA: 40.4 MMHG (ref 35–45)
PCO2 BLDA: 42.8 MMHG (ref 35–45)
PCO2 BLDA: 44.7 MMHG (ref 35–45)
PCO2 BLDA: 47.8 MMHG (ref 35–45)
PCO2 BLDA: 48.1 MMHG (ref 35–45)
PEEP: 5
PH SMN: 7.27 [PH] (ref 7.35–7.45)
PH SMN: 7.34 [PH] (ref 7.35–7.45)
PH SMN: 7.34 [PH] (ref 7.35–7.45)
PH SMN: 7.36 [PH] (ref 7.35–7.45)
PH SMN: 7.37 [PH] (ref 7.35–7.45)
PH SMN: 7.38 [PH] (ref 7.35–7.45)
PH SMN: 7.38 [PH] (ref 7.35–7.45)
PH SMN: 7.39 [PH] (ref 7.35–7.45)
PH SMN: 7.4 [PH] (ref 7.35–7.45)
PH SMN: 7.41 [PH] (ref 7.35–7.45)
PHOSPHATE SERPL-MCNC: 2.5 MG/DL
PHOSPHATE SERPL-MCNC: 3.2 MG/DL
PHOSPHATE SERPL-MCNC: 3.4 MG/DL
PIP: 15
PIP: 16
PIP: 17
PIP: 18
PLATELET # BLD AUTO: 101 K/UL
PLATELET # BLD AUTO: 128 K/UL
PLATELET # BLD AUTO: 128 K/UL
PLATELET # BLD AUTO: 82 K/UL
PLATELET BLD QL SMEAR: ABNORMAL
PMV BLD AUTO: 10.9 FL
PMV BLD AUTO: 11.2 FL
PMV BLD AUTO: 11.4 FL
PMV BLD AUTO: 11.4 FL
PO2 BLDA: 124 MMHG (ref 80–100)
PO2 BLDA: 127 MMHG (ref 80–100)
PO2 BLDA: 131 MMHG (ref 80–100)
PO2 BLDA: 138 MMHG (ref 80–100)
PO2 BLDA: 300 MMHG (ref 80–100)
PO2 BLDA: 302 MMHG (ref 80–100)
PO2 BLDA: 307 MMHG (ref 80–100)
PO2 BLDA: 310 MMHG (ref 80–100)
PO2 BLDA: 392 MMHG (ref 80–100)
PO2 BLDA: 46 MMHG (ref 40–60)
POC BE: -4 MMOL/L
POC BE: -6 MMOL/L
POC BE: -6 MMOL/L
POC BE: 0 MMOL/L
POC BE: 1 MMOL/L
POC BE: 3 MMOL/L
POC IONIZED CALCIUM: 1.04 MMOL/L (ref 1.06–1.42)
POC IONIZED CALCIUM: 1.05 MMOL/L (ref 1.06–1.42)
POC IONIZED CALCIUM: 1.05 MMOL/L (ref 1.06–1.42)
POC IONIZED CALCIUM: 1.06 MMOL/L (ref 1.06–1.42)
POC IONIZED CALCIUM: 1.06 MMOL/L (ref 1.06–1.42)
POC IONIZED CALCIUM: 1.17 MMOL/L (ref 1.06–1.42)
POC SATURATED O2: 100 % (ref 95–100)
POC SATURATED O2: 78 % (ref 95–100)
POC SATURATED O2: 99 % (ref 95–100)
POC TCO2: 21 MMOL/L (ref 23–27)
POC TCO2: 22 MMOL/L (ref 23–27)
POC TCO2: 23 MMOL/L (ref 23–27)
POC TCO2: 26 MMOL/L (ref 23–27)
POC TCO2: 26 MMOL/L (ref 23–27)
POC TCO2: 27 MMOL/L (ref 23–27)
POC TCO2: 27 MMOL/L (ref 24–29)
POC TCO2: 30 MMOL/L (ref 23–27)
POCT GLUCOSE: 104 MG/DL (ref 70–110)
POCT GLUCOSE: 104 MG/DL (ref 70–110)
POCT GLUCOSE: 112 MG/DL (ref 70–110)
POCT GLUCOSE: 116 MG/DL (ref 70–110)
POCT GLUCOSE: 122 MG/DL (ref 70–110)
POCT GLUCOSE: 122 MG/DL (ref 70–110)
POCT GLUCOSE: 123 MG/DL (ref 70–110)
POCT GLUCOSE: 151 MG/DL (ref 70–110)
POIKILOCYTOSIS BLD QL SMEAR: SLIGHT
POLYCHROMASIA BLD QL SMEAR: ABNORMAL
POTASSIUM BLD-SCNC: 3.5 MMOL/L (ref 3.5–5.1)
POTASSIUM BLD-SCNC: 4.4 MMOL/L (ref 3.5–5.1)
POTASSIUM BLD-SCNC: 4.8 MMOL/L (ref 3.5–5.1)
POTASSIUM BLD-SCNC: 5.2 MMOL/L (ref 3.5–5.1)
POTASSIUM SERPL-SCNC: 3.6 MMOL/L
POTASSIUM SERPL-SCNC: 3.7 MMOL/L
POTASSIUM SERPL-SCNC: 5.1 MMOL/L
PROT SERPL-MCNC: 5.5 G/DL
PROT SERPL-MCNC: 7.2 G/DL
PROTHROMBIN TIME: 11.2 SEC
PROTHROMBIN TIME: 11.8 SEC
PROTHROMBIN TIME: 12 SEC
PROVIDER CREDENTIALS: ABNORMAL
PROVIDER NOTIFIED: ABNORMAL
PS: 10
RBC # BLD AUTO: 3.08 M/UL
RBC # BLD AUTO: 3.13 M/UL
RBC # BLD AUTO: 3.65 M/UL
RBC # BLD AUTO: 3.65 M/UL
SAMPLE: ABNORMAL
SITE: ABNORMAL
SODIUM BLD-SCNC: 140 MMOL/L (ref 136–145)
SODIUM BLD-SCNC: 142 MMOL/L (ref 136–145)
SODIUM BLD-SCNC: 145 MMOL/L (ref 136–145)
SODIUM SERPL-SCNC: 139 MMOL/L
SODIUM SERPL-SCNC: 145 MMOL/L
SODIUM SERPL-SCNC: 145 MMOL/L
SP02: 100
TIME NOTIFIED: 1514
TIME NOTIFIED: 1514
TIME NOTIFIED: 1702
TIME NOTIFIED: 1803
VERBAL RESULT READBACK PERFORMED: YES
VT: 400
VT: 400
VT: 498
VT: 500
VT: 500
VT: 620
WBC # BLD AUTO: 12.55 K/UL
WBC # BLD AUTO: 6.82 K/UL
WBC # BLD AUTO: 6.82 K/UL
WBC # BLD AUTO: 8.83 K/UL

## 2018-05-25 PROCEDURE — 25000242 PHARM REV CODE 250 ALT 637 W/ HCPCS: Performed by: THORACIC SURGERY (CARDIOTHORACIC VASCULAR SURGERY)

## 2018-05-25 PROCEDURE — 94761 N-INVAS EAR/PLS OXIMETRY MLT: CPT

## 2018-05-25 PROCEDURE — 36000713 HC OR TIME LEV V EA ADD 15 MIN: Performed by: THORACIC SURGERY (CARDIOTHORACIC VASCULAR SURGERY)

## 2018-05-25 PROCEDURE — 85610 PROTHROMBIN TIME: CPT | Mod: 91

## 2018-05-25 PROCEDURE — C1751 CATH, INF, PER/CENT/MIDLINE: HCPCS | Performed by: STUDENT IN AN ORGANIZED HEALTH CARE EDUCATION/TRAINING PROGRAM

## 2018-05-25 PROCEDURE — 93005 ELECTROCARDIOGRAM TRACING: CPT

## 2018-05-25 PROCEDURE — 36592 COLLECT BLOOD FROM PICC: CPT

## 2018-05-25 PROCEDURE — 85384 FIBRINOGEN ACTIVITY: CPT

## 2018-05-25 PROCEDURE — 27100025 HC TUBING, SET FLUID WARMER: Performed by: STUDENT IN AN ORGANIZED HEALTH CARE EDUCATION/TRAINING PROGRAM

## 2018-05-25 PROCEDURE — 37799 UNLISTED PX VASCULAR SURGERY: CPT

## 2018-05-25 PROCEDURE — 99232 SBSQ HOSP IP/OBS MODERATE 35: CPT | Mod: GC,,, | Performed by: ANESTHESIOLOGY

## 2018-05-25 PROCEDURE — 63600175 PHARM REV CODE 636 W HCPCS: Performed by: STUDENT IN AN ORGANIZED HEALTH CARE EDUCATION/TRAINING PROGRAM

## 2018-05-25 PROCEDURE — 82803 BLOOD GASES ANY COMBINATION: CPT

## 2018-05-25 PROCEDURE — 25000003 PHARM REV CODE 250: Performed by: THORACIC SURGERY (CARDIOTHORACIC VASCULAR SURGERY)

## 2018-05-25 PROCEDURE — 36415 COLL VENOUS BLD VENIPUNCTURE: CPT

## 2018-05-25 PROCEDURE — 33430 REPLACEMENT OF MITRAL VALVE: CPT | Mod: ,,, | Performed by: THORACIC SURGERY (CARDIOTHORACIC VASCULAR SURGERY)

## 2018-05-25 PROCEDURE — 85520 HEPARIN ASSAY: CPT

## 2018-05-25 PROCEDURE — 5A1221Z PERFORMANCE OF CARDIAC OUTPUT, CONTINUOUS: ICD-10-PCS | Performed by: THORACIC SURGERY (CARDIOTHORACIC VASCULAR SURGERY)

## 2018-05-25 PROCEDURE — 63600175 PHARM REV CODE 636 W HCPCS: Performed by: THORACIC SURGERY (CARDIOTHORACIC VASCULAR SURGERY)

## 2018-05-25 PROCEDURE — A4216 STERILE WATER/SALINE, 10 ML: HCPCS | Performed by: THORACIC SURGERY (CARDIOTHORACIC VASCULAR SURGERY)

## 2018-05-25 PROCEDURE — 02100Z9 BYPASS CORONARY ARTERY, ONE ARTERY FROM LEFT INTERNAL MAMMARY, OPEN APPROACH: ICD-10-PCS | Performed by: THORACIC SURGERY (CARDIOTHORACIC VASCULAR SURGERY)

## 2018-05-25 PROCEDURE — 93010 ELECTROCARDIOGRAM REPORT: CPT | Mod: ,,, | Performed by: INTERNAL MEDICINE

## 2018-05-25 PROCEDURE — 80053 COMPREHEN METABOLIC PANEL: CPT | Mod: 91

## 2018-05-25 PROCEDURE — 94640 AIRWAY INHALATION TREATMENT: CPT

## 2018-05-25 PROCEDURE — 37000008 HC ANESTHESIA 1ST 15 MINUTES: Performed by: THORACIC SURGERY (CARDIOTHORACIC VASCULAR SURGERY)

## 2018-05-25 PROCEDURE — 84295 ASSAY OF SERUM SODIUM: CPT

## 2018-05-25 PROCEDURE — 27000221 HC OXYGEN, UP TO 24 HOURS

## 2018-05-25 PROCEDURE — 25000003 PHARM REV CODE 250

## 2018-05-25 PROCEDURE — 84132 ASSAY OF SERUM POTASSIUM: CPT

## 2018-05-25 PROCEDURE — 85730 THROMBOPLASTIN TIME PARTIAL: CPT | Mod: 91

## 2018-05-25 PROCEDURE — S0028 INJECTION, FAMOTIDINE, 20 MG: HCPCS | Performed by: THORACIC SURGERY (CARDIOTHORACIC VASCULAR SURGERY)

## 2018-05-25 PROCEDURE — 27000191 HC C-V MONITORING

## 2018-05-25 PROCEDURE — 37000009 HC ANESTHESIA EA ADD 15 MINS: Performed by: THORACIC SURGERY (CARDIOTHORACIC VASCULAR SURGERY)

## 2018-05-25 PROCEDURE — 27100088 HC CELL SAVER

## 2018-05-25 PROCEDURE — 93312 ECHO TRANSESOPHAGEAL: CPT | Mod: 50,59,, | Performed by: ANESTHESIOLOGY

## 2018-05-25 PROCEDURE — 85610 PROTHROMBIN TIME: CPT

## 2018-05-25 PROCEDURE — 88305 TISSUE EXAM BY PATHOLOGIST: CPT | Performed by: PATHOLOGY

## 2018-05-25 PROCEDURE — 99222 1ST HOSP IP/OBS MODERATE 55: CPT | Mod: ,,, | Performed by: NURSE PRACTITIONER

## 2018-05-25 PROCEDURE — 83605 ASSAY OF LACTIC ACID: CPT

## 2018-05-25 PROCEDURE — 27000175 HC ADULT BYPASS PUMP

## 2018-05-25 PROCEDURE — 84100 ASSAY OF PHOSPHORUS: CPT | Mod: 91

## 2018-05-25 PROCEDURE — 27800595 HC HEART VALVES

## 2018-05-25 PROCEDURE — 36000712 HC OR TIME LEV V 1ST 15 MIN: Performed by: THORACIC SURGERY (CARDIOTHORACIC VASCULAR SURGERY)

## 2018-05-25 PROCEDURE — 84100 ASSAY OF PHOSPHORUS: CPT

## 2018-05-25 PROCEDURE — 36556 INSERT NON-TUNNEL CV CATH: CPT | Mod: 59,,, | Performed by: ANESTHESIOLOGY

## 2018-05-25 PROCEDURE — 94002 VENT MGMT INPAT INIT DAY: CPT

## 2018-05-25 PROCEDURE — 27000202 HC IABP, ADD'L DAY

## 2018-05-25 PROCEDURE — 25000003 PHARM REV CODE 250: Performed by: STUDENT IN AN ORGANIZED HEALTH CARE EDUCATION/TRAINING PROGRAM

## 2018-05-25 PROCEDURE — P9021 RED BLOOD CELLS UNIT: HCPCS

## 2018-05-25 PROCEDURE — C1729 CATH, DRAINAGE: HCPCS | Performed by: THORACIC SURGERY (CARDIOTHORACIC VASCULAR SURGERY)

## 2018-05-25 PROCEDURE — 27201423 OPTIME MED/SURG SUP & DEVICES STERILE SUPPLY: Performed by: THORACIC SURGERY (CARDIOTHORACIC VASCULAR SURGERY)

## 2018-05-25 PROCEDURE — 93503 INSERT/PLACE HEART CATHETER: CPT | Mod: 59,,, | Performed by: ANESTHESIOLOGY

## 2018-05-25 PROCEDURE — 80053 COMPREHEN METABOLIC PANEL: CPT

## 2018-05-25 PROCEDURE — P9017 PLASMA 1 DONOR FRZ W/IN 8 HR: HCPCS

## 2018-05-25 PROCEDURE — 85025 COMPLETE CBC W/AUTO DIFF WBC: CPT | Mod: 91

## 2018-05-25 PROCEDURE — 02RG08Z REPLACEMENT OF MITRAL VALVE WITH ZOOPLASTIC TISSUE, OPEN APPROACH: ICD-10-PCS | Performed by: THORACIC SURGERY (CARDIOTHORACIC VASCULAR SURGERY)

## 2018-05-25 PROCEDURE — 83735 ASSAY OF MAGNESIUM: CPT | Mod: 91

## 2018-05-25 PROCEDURE — 85730 THROMBOPLASTIN TIME PARTIAL: CPT

## 2018-05-25 PROCEDURE — 80048 BASIC METABOLIC PNL TOTAL CA: CPT

## 2018-05-25 PROCEDURE — 20000000 HC ICU ROOM

## 2018-05-25 PROCEDURE — 85014 HEMATOCRIT: CPT

## 2018-05-25 PROCEDURE — 36620 INSERTION CATHETER ARTERY: CPT | Mod: 59,,, | Performed by: ANESTHESIOLOGY

## 2018-05-25 PROCEDURE — 27000445 HC TEMPORARY PACEMAKER LEADS

## 2018-05-25 PROCEDURE — 83735 ASSAY OF MAGNESIUM: CPT

## 2018-05-25 PROCEDURE — P9045 ALBUMIN (HUMAN), 5%, 250 ML: HCPCS | Mod: JG | Performed by: THORACIC SURGERY (CARDIOTHORACIC VASCULAR SURGERY)

## 2018-05-25 PROCEDURE — 27200953 HC CARDIOPLEGIA SYSTEM

## 2018-05-25 PROCEDURE — 33533 CABG ARTERIAL SINGLE: CPT | Mod: 51,,, | Performed by: THORACIC SURGERY (CARDIOTHORACIC VASCULAR SURGERY)

## 2018-05-25 PROCEDURE — 27201037 HC PRESSURE MONITORING SET UP

## 2018-05-25 PROCEDURE — D9220A PRA ANESTHESIA: Mod: ,,, | Performed by: ANESTHESIOLOGY

## 2018-05-25 RX ORDER — PROTAMINE SULFATE 10 MG/ML
INJECTION, SOLUTION INTRAVENOUS
Status: DISCONTINUED | OUTPATIENT
Start: 2018-05-25 | End: 2018-05-25

## 2018-05-25 RX ORDER — LACTULOSE 10 G/15ML
20 SOLUTION ORAL EVERY 6 HOURS PRN
Status: DISCONTINUED | OUTPATIENT
Start: 2018-05-27 | End: 2018-06-01

## 2018-05-25 RX ORDER — KETAMINE HCL IN 0.9 % NACL 50 MG/5 ML
SYRINGE (ML) INTRAVENOUS
Status: DISCONTINUED | OUTPATIENT
Start: 2018-05-25 | End: 2018-05-25

## 2018-05-25 RX ORDER — GLYCOPYRROLATE 0.2 MG/ML
INJECTION INTRAMUSCULAR; INTRAVENOUS
Status: DISCONTINUED | OUTPATIENT
Start: 2018-05-25 | End: 2018-05-25

## 2018-05-25 RX ORDER — FENTANYL CITRATE 50 UG/ML
25 INJECTION, SOLUTION INTRAMUSCULAR; INTRAVENOUS
Status: DISCONTINUED | OUTPATIENT
Start: 2018-05-25 | End: 2018-05-28

## 2018-05-25 RX ORDER — NICARDIPINE HYDROCHLORIDE 0.2 MG/ML
5 INJECTION INTRAVENOUS CONTINUOUS
Status: DISCONTINUED | OUTPATIENT
Start: 2018-05-25 | End: 2018-05-29

## 2018-05-25 RX ORDER — ONDANSETRON 2 MG/ML
INJECTION INTRAMUSCULAR; INTRAVENOUS
Status: DISCONTINUED | OUTPATIENT
Start: 2018-05-25 | End: 2018-05-25

## 2018-05-25 RX ORDER — TRANEXAMIC ACID 100 MG/ML
INJECTION, SOLUTION INTRAVENOUS
Status: DISCONTINUED | OUTPATIENT
Start: 2018-05-25 | End: 2018-05-25

## 2018-05-25 RX ORDER — SODIUM CHLORIDE, SODIUM LACTATE, POTASSIUM CHLORIDE, CALCIUM CHLORIDE 600; 310; 30; 20 MG/100ML; MG/100ML; MG/100ML; MG/100ML
1000 INJECTION, SOLUTION INTRAVENOUS
Status: DISCONTINUED | OUTPATIENT
Start: 2018-05-25 | End: 2018-06-20 | Stop reason: HOSPADM

## 2018-05-25 RX ORDER — ASPIRIN 325 MG
325 TABLET ORAL DAILY
Status: DISCONTINUED | OUTPATIENT
Start: 2018-05-26 | End: 2018-05-28

## 2018-05-25 RX ORDER — FENTANYL CITRATE 50 UG/ML
50 INJECTION, SOLUTION INTRAMUSCULAR; INTRAVENOUS
Status: DISCONTINUED | OUTPATIENT
Start: 2018-05-25 | End: 2018-05-28

## 2018-05-25 RX ORDER — CEFAZOLIN SODIUM 1 G/3ML
2 INJECTION, POWDER, FOR SOLUTION INTRAMUSCULAR; INTRAVENOUS
Status: COMPLETED | OUTPATIENT
Start: 2018-05-25 | End: 2018-05-27

## 2018-05-25 RX ORDER — ALBUMIN HUMAN 50 G/1000ML
500 SOLUTION INTRAVENOUS
Status: DISCONTINUED | OUTPATIENT
Start: 2018-05-25 | End: 2018-06-12

## 2018-05-25 RX ORDER — NOREPINEPHRINE BITARTRATE/D5W 4MG/250ML
0.02 PLASTIC BAG, INJECTION (ML) INTRAVENOUS CONTINUOUS
Status: DISCONTINUED | OUTPATIENT
Start: 2018-05-25 | End: 2018-05-29

## 2018-05-25 RX ORDER — MAGNESIUM SULFATE HEPTAHYDRATE 40 MG/ML
2 INJECTION, SOLUTION INTRAVENOUS
Status: DISCONTINUED | OUTPATIENT
Start: 2018-05-25 | End: 2018-05-29

## 2018-05-25 RX ORDER — CEFAZOLIN SODIUM 1 G/3ML
INJECTION, POWDER, FOR SOLUTION INTRAMUSCULAR; INTRAVENOUS
Status: DISCONTINUED | OUTPATIENT
Start: 2018-05-25 | End: 2018-05-25

## 2018-05-25 RX ORDER — POLYETHYLENE GLYCOL 3350 17 G/17G
17 POWDER, FOR SOLUTION ORAL DAILY
Status: DISCONTINUED | OUTPATIENT
Start: 2018-05-25 | End: 2018-05-30

## 2018-05-25 RX ORDER — TRANEXAMIC ACID 100 MG/ML
INJECTION, SOLUTION INTRAVENOUS CONTINUOUS PRN
Status: DISCONTINUED | OUTPATIENT
Start: 2018-05-25 | End: 2018-05-25

## 2018-05-25 RX ORDER — LIDOCAINE HCL/PF 100 MG/5ML
SYRINGE (ML) INTRAVENOUS
Status: DISCONTINUED | OUTPATIENT
Start: 2018-05-25 | End: 2018-05-25

## 2018-05-25 RX ORDER — ATORVASTATIN CALCIUM 20 MG/1
40 TABLET, FILM COATED ORAL DAILY
Status: DISCONTINUED | OUTPATIENT
Start: 2018-05-25 | End: 2018-05-30

## 2018-05-25 RX ORDER — FUROSEMIDE 10 MG/ML
10 INJECTION INTRAMUSCULAR; INTRAVENOUS ONCE
Status: COMPLETED | OUTPATIENT
Start: 2018-05-25 | End: 2018-05-25

## 2018-05-25 RX ORDER — ASPIRIN 325 MG
325 TABLET ORAL ONCE
Status: COMPLETED | OUTPATIENT
Start: 2018-05-25 | End: 2018-05-25

## 2018-05-25 RX ORDER — NEOSTIGMINE METHYLSULFATE 1 MG/ML
INJECTION, SOLUTION INTRAVENOUS
Status: DISCONTINUED | OUTPATIENT
Start: 2018-05-25 | End: 2018-05-25

## 2018-05-25 RX ORDER — MIDAZOLAM HYDROCHLORIDE 1 MG/ML
INJECTION, SOLUTION INTRAMUSCULAR; INTRAVENOUS
Status: DISCONTINUED | OUTPATIENT
Start: 2018-05-25 | End: 2018-05-25

## 2018-05-25 RX ORDER — POTASSIUM CHLORIDE 14.9 MG/ML
20 INJECTION INTRAVENOUS
Status: DISCONTINUED | OUTPATIENT
Start: 2018-05-25 | End: 2018-05-29

## 2018-05-25 RX ORDER — BACITRACIN 50000 [IU]/1
INJECTION, POWDER, FOR SOLUTION INTRAMUSCULAR
Status: DISCONTINUED | OUTPATIENT
Start: 2018-05-25 | End: 2018-05-25 | Stop reason: HOSPADM

## 2018-05-25 RX ORDER — METOCLOPRAMIDE HYDROCHLORIDE 5 MG/ML
5 INJECTION INTRAMUSCULAR; INTRAVENOUS EVERY 6 HOURS PRN
Status: DISCONTINUED | OUTPATIENT
Start: 2018-05-25 | End: 2018-06-20 | Stop reason: HOSPADM

## 2018-05-25 RX ORDER — SODIUM BICARBONATE 1 MEQ/ML
SYRINGE (ML) INTRAVENOUS
Status: COMPLETED
Start: 2018-05-25 | End: 2018-05-25

## 2018-05-25 RX ORDER — FAMOTIDINE 10 MG/ML
20 INJECTION INTRAVENOUS 2 TIMES DAILY
Status: DISCONTINUED | OUTPATIENT
Start: 2018-05-25 | End: 2018-05-28

## 2018-05-25 RX ORDER — POTASSIUM CHLORIDE 14.9 MG/ML
60 INJECTION INTRAVENOUS
Status: DISCONTINUED | OUTPATIENT
Start: 2018-05-25 | End: 2018-05-29

## 2018-05-25 RX ORDER — NITROGLYCERIN 5 MG/ML
INJECTION, SOLUTION INTRAVENOUS
Status: DISCONTINUED | OUTPATIENT
Start: 2018-05-25 | End: 2018-05-25

## 2018-05-25 RX ORDER — TAMSULOSIN HYDROCHLORIDE 0.4 MG/1
0.4 CAPSULE ORAL DAILY
Status: DISCONTINUED | OUTPATIENT
Start: 2018-05-26 | End: 2018-05-28

## 2018-05-25 RX ORDER — ROCURONIUM BROMIDE 10 MG/ML
INJECTION, SOLUTION INTRAVENOUS
Status: DISCONTINUED | OUTPATIENT
Start: 2018-05-25 | End: 2018-05-25

## 2018-05-25 RX ORDER — ESMOLOL HYDROCHLORIDE 10 MG/ML
INJECTION INTRAVENOUS
Status: DISCONTINUED | OUTPATIENT
Start: 2018-05-25 | End: 2018-05-25

## 2018-05-25 RX ORDER — POTASSIUM CHLORIDE 29.8 MG/ML
40 INJECTION INTRAVENOUS
Status: DISCONTINUED | OUTPATIENT
Start: 2018-05-25 | End: 2018-05-29

## 2018-05-25 RX ORDER — ACETAMINOPHEN 10 MG/ML
INJECTION, SOLUTION INTRAVENOUS
Status: DISCONTINUED | OUTPATIENT
Start: 2018-05-25 | End: 2018-05-25

## 2018-05-25 RX ORDER — ONDANSETRON 4 MG/1
4 TABLET, ORALLY DISINTEGRATING ORAL EVERY 12 HOURS PRN
Status: DISCONTINUED | OUTPATIENT
Start: 2018-05-25 | End: 2018-06-20 | Stop reason: HOSPADM

## 2018-05-25 RX ORDER — HEPARIN SODIUM 1000 [USP'U]/ML
INJECTION, SOLUTION INTRAVENOUS; SUBCUTANEOUS
Status: DISCONTINUED | OUTPATIENT
Start: 2018-05-25 | End: 2018-05-25

## 2018-05-25 RX ORDER — MORPHINE SULFATE ORAL SOLUTION 10 MG/5ML
10 SOLUTION ORAL
Status: DISCONTINUED | OUTPATIENT
Start: 2018-05-25 | End: 2018-05-28

## 2018-05-25 RX ORDER — OXYCODONE HYDROCHLORIDE 5 MG/1
10 TABLET ORAL
Status: DISCONTINUED | OUTPATIENT
Start: 2018-05-25 | End: 2018-05-28

## 2018-05-25 RX ORDER — VASOPRESSIN 20 [USP'U]/ML
INJECTION, SOLUTION INTRAMUSCULAR; SUBCUTANEOUS
Status: DISCONTINUED | OUTPATIENT
Start: 2018-05-25 | End: 2018-05-25

## 2018-05-25 RX ORDER — SODIUM CHLORIDE 0.9 % (FLUSH) 0.9 %
3 SYRINGE (ML) INJECTION EVERY 8 HOURS
Status: DISCONTINUED | OUTPATIENT
Start: 2018-05-25 | End: 2018-06-20 | Stop reason: HOSPADM

## 2018-05-25 RX ORDER — MUPIROCIN 20 MG/G
1 OINTMENT TOPICAL 2 TIMES DAILY
Status: COMPLETED | OUTPATIENT
Start: 2018-05-25 | End: 2018-05-30

## 2018-05-25 RX ORDER — HEPARIN SODIUM 1000 [USP'U]/ML
INJECTION, SOLUTION INTRAVENOUS; SUBCUTANEOUS
Status: DISCONTINUED | OUTPATIENT
Start: 2018-05-25 | End: 2018-05-25 | Stop reason: HOSPADM

## 2018-05-25 RX ORDER — PROPOFOL 10 MG/ML
5 INJECTION, EMULSION INTRAVENOUS CONTINUOUS
Status: DISCONTINUED | OUTPATIENT
Start: 2018-05-25 | End: 2018-05-29

## 2018-05-25 RX ORDER — PAPAVERINE HYDROCHLORIDE 30 MG/ML
INJECTION INTRAMUSCULAR; INTRAVENOUS
Status: DISCONTINUED | OUTPATIENT
Start: 2018-05-25 | End: 2018-05-25 | Stop reason: HOSPADM

## 2018-05-25 RX ORDER — OXYCODONE HYDROCHLORIDE 5 MG/1
5 TABLET ORAL
Status: DISCONTINUED | OUTPATIENT
Start: 2018-05-25 | End: 2018-05-28

## 2018-05-25 RX ORDER — FENTANYL CITRATE 50 UG/ML
INJECTION, SOLUTION INTRAMUSCULAR; INTRAVENOUS
Status: DISCONTINUED | OUTPATIENT
Start: 2018-05-25 | End: 2018-05-25

## 2018-05-25 RX ORDER — NOREPINEPHRINE BITARTRATE 1 MG/ML
INJECTION, SOLUTION INTRAVENOUS
Status: DISCONTINUED | OUTPATIENT
Start: 2018-05-25 | End: 2018-05-25

## 2018-05-25 RX ORDER — IPRATROPIUM BROMIDE AND ALBUTEROL SULFATE 2.5; .5 MG/3ML; MG/3ML
3 SOLUTION RESPIRATORY (INHALATION) EVERY 4 HOURS
Status: DISCONTINUED | OUTPATIENT
Start: 2018-05-25 | End: 2018-05-27

## 2018-05-25 RX ORDER — INDOMETHACIN 25 MG/1
50 CAPSULE ORAL ONCE
Status: DISCONTINUED | OUTPATIENT
Start: 2018-05-25 | End: 2018-05-28

## 2018-05-25 RX ORDER — ETOMIDATE 2 MG/ML
INJECTION INTRAVENOUS
Status: DISCONTINUED | OUTPATIENT
Start: 2018-05-25 | End: 2018-05-25

## 2018-05-25 RX ORDER — ONDANSETRON 2 MG/ML
4 INJECTION INTRAMUSCULAR; INTRAVENOUS EVERY 12 HOURS PRN
Status: DISCONTINUED | OUTPATIENT
Start: 2018-05-25 | End: 2018-05-25

## 2018-05-25 RX ADMIN — SODIUM CHLORIDE 3 UNITS/HR: 9 INJECTION, SOLUTION INTRAVENOUS at 04:05

## 2018-05-25 RX ADMIN — ACETAMINOPHEN 1000 MG: 10 INJECTION, SOLUTION INTRAVENOUS at 08:05

## 2018-05-25 RX ADMIN — VASOPRESSIN 1 UNITS: 20 INJECTION INTRAVENOUS at 07:05

## 2018-05-25 RX ADMIN — ROCURONIUM BROMIDE 100 MG: 10 INJECTION, SOLUTION INTRAVENOUS at 07:05

## 2018-05-25 RX ADMIN — EPINEPHRINE 0.04 MCG/KG/MIN: 1 INJECTION INTRAMUSCULAR; INTRAVENOUS; SUBCUTANEOUS at 01:05

## 2018-05-25 RX ADMIN — NOREPINEPHRINE BITARTRATE 32 MCG: 1 INJECTION, SOLUTION, CONCENTRATE INTRAVENOUS at 01:05

## 2018-05-25 RX ADMIN — MIDAZOLAM HYDROCHLORIDE 2 MG: 1 INJECTION, SOLUTION INTRAMUSCULAR; INTRAVENOUS at 07:05

## 2018-05-25 RX ADMIN — FENTANYL CITRATE 50 MCG: 50 INJECTION INTRAMUSCULAR; INTRAVENOUS at 04:05

## 2018-05-25 RX ADMIN — POTASSIUM CHLORIDE 20 MEQ: 200 INJECTION, SOLUTION INTRAVENOUS at 04:05

## 2018-05-25 RX ADMIN — Medication 25 MG: at 09:05

## 2018-05-25 RX ADMIN — VASOPRESSIN 2 UNITS: 20 INJECTION INTRAVENOUS at 07:05

## 2018-05-25 RX ADMIN — SODIUM CHLORIDE, SODIUM GLUCONATE, SODIUM ACETATE, POTASSIUM CHLORIDE, MAGNESIUM CHLORIDE, SODIUM PHOSPHATE, DIBASIC, AND POTASSIUM PHOSPHATE: .53; .5; .37; .037; .03; .012; .00082 INJECTION, SOLUTION INTRAVENOUS at 10:05

## 2018-05-25 RX ADMIN — CEFAZOLIN 2 G: 330 INJECTION, POWDER, FOR SOLUTION INTRAMUSCULAR; INTRAVENOUS at 08:05

## 2018-05-25 RX ADMIN — NOREPINEPHRINE BITARTRATE 0.04 MCG/KG/MIN: 1 INJECTION, SOLUTION, CONCENTRATE INTRAVENOUS at 07:05

## 2018-05-25 RX ADMIN — FENTANYL CITRATE 150 MCG: 50 INJECTION, SOLUTION INTRAMUSCULAR; INTRAVENOUS at 01:05

## 2018-05-25 RX ADMIN — FENTANYL CITRATE 250 MCG: 50 INJECTION, SOLUTION INTRAMUSCULAR; INTRAVENOUS at 09:05

## 2018-05-25 RX ADMIN — NICARDIPINE HYDROCHLORIDE 5 MG/HR: 0.2 INJECTION, SOLUTION INTRAVENOUS at 05:05

## 2018-05-25 RX ADMIN — NOREPINEPHRINE BITARTRATE 32 MCG: 1 INJECTION, SOLUTION, CONCENTRATE INTRAVENOUS at 10:05

## 2018-05-25 RX ADMIN — TRANEXAMIC ACID 720 MG: 100 INJECTION, SOLUTION INTRAVENOUS at 08:05

## 2018-05-25 RX ADMIN — CALCIUM CHLORIDE 500 MG: 100 INJECTION, SOLUTION INTRAVENOUS at 01:05

## 2018-05-25 RX ADMIN — POTASSIUM CHLORIDE 20 MEQ: 200 INJECTION, SOLUTION INTRAVENOUS at 11:05

## 2018-05-25 RX ADMIN — GLYCOPYRROLATE 0.6 MG: 0.2 INJECTION, SOLUTION INTRAMUSCULAR; INTRAVENOUS at 03:05

## 2018-05-25 RX ADMIN — ASPIRIN 325 MG ORAL TABLET 325 MG: 325 PILL ORAL at 08:05

## 2018-05-25 RX ADMIN — ROCURONIUM BROMIDE 30 MG: 10 INJECTION, SOLUTION INTRAVENOUS at 09:05

## 2018-05-25 RX ADMIN — Medication 3 ML: at 10:05

## 2018-05-25 RX ADMIN — Medication 25 MG: at 01:05

## 2018-05-25 RX ADMIN — ROCURONIUM BROMIDE 30 MG: 10 INJECTION, SOLUTION INTRAVENOUS at 01:05

## 2018-05-25 RX ADMIN — SODIUM CHLORIDE 4 UNITS/HR: 9 INJECTION, SOLUTION INTRAVENOUS at 12:05

## 2018-05-25 RX ADMIN — ETOMIDATE 12 MG: 2 INJECTION, SOLUTION INTRAVENOUS at 07:05

## 2018-05-25 RX ADMIN — ALBUMIN HUMAN 500 ML: 0.05 INJECTION, SOLUTION INTRAVENOUS at 04:05

## 2018-05-25 RX ADMIN — MUPIROCIN 1 G: 20 OINTMENT TOPICAL at 08:05

## 2018-05-25 RX ADMIN — SODIUM CHLORIDE, SODIUM GLUCONATE, SODIUM ACETATE, POTASSIUM CHLORIDE, MAGNESIUM CHLORIDE, SODIUM PHOSPHATE, DIBASIC, AND POTASSIUM PHOSPHATE: .53; .5; .37; .037; .03; .012; .00082 INJECTION, SOLUTION INTRAVENOUS at 08:05

## 2018-05-25 RX ADMIN — FENTANYL CITRATE 250 MCG: 50 INJECTION, SOLUTION INTRAMUSCULAR; INTRAVENOUS at 07:05

## 2018-05-25 RX ADMIN — TRANEXAMIC ACID 1 MG/KG/HR: 100 INJECTION, SOLUTION INTRAVENOUS at 08:05

## 2018-05-25 RX ADMIN — PROPOFOL 40 MCG/KG/MIN: 10 INJECTION, EMULSION INTRAVENOUS at 08:05

## 2018-05-25 RX ADMIN — ATORVASTATIN CALCIUM 40 MG: 20 TABLET, FILM COATED ORAL at 08:05

## 2018-05-25 RX ADMIN — NOREPINEPHRINE BITARTRATE 16 MCG: 1 INJECTION, SOLUTION, CONCENTRATE INTRAVENOUS at 01:05

## 2018-05-25 RX ADMIN — ROCURONIUM BROMIDE 20 MG: 10 INJECTION, SOLUTION INTRAVENOUS at 10:05

## 2018-05-25 RX ADMIN — HEPARIN SODIUM 31000 UNITS: 1000 INJECTION, SOLUTION INTRAVENOUS; SUBCUTANEOUS at 10:05

## 2018-05-25 RX ADMIN — NITROGLYCERIN 50 MCG: 5 INJECTION, SOLUTION INTRAVENOUS at 01:05

## 2018-05-25 RX ADMIN — ESMOLOL HYDROCHLORIDE 50 MG: 10 INJECTION INTRAVENOUS at 10:05

## 2018-05-25 RX ADMIN — ROCURONIUM BROMIDE 50 MG: 10 INJECTION, SOLUTION INTRAVENOUS at 11:05

## 2018-05-25 RX ADMIN — IPRATROPIUM BROMIDE AND ALBUTEROL SULFATE 3 ML: .5; 3 SOLUTION RESPIRATORY (INHALATION) at 11:05

## 2018-05-25 RX ADMIN — PROTAMINE SULFATE 260 MG: 10 INJECTION, SOLUTION INTRAVENOUS at 01:05

## 2018-05-25 RX ADMIN — LIDOCAINE HYDROCHLORIDE 100 MG: 20 INJECTION, SOLUTION INTRAVENOUS at 01:05

## 2018-05-25 RX ADMIN — IPRATROPIUM BROMIDE AND ALBUTEROL SULFATE 3 ML: .5; 3 SOLUTION RESPIRATORY (INHALATION) at 03:05

## 2018-05-25 RX ADMIN — CEFAZOLIN 2 G: 1 INJECTION, POWDER, FOR SOLUTION INTRAMUSCULAR; INTRAVENOUS at 09:05

## 2018-05-25 RX ADMIN — NOREPINEPHRINE BITARTRATE 16 MCG: 1 INJECTION, SOLUTION, CONCENTRATE INTRAVENOUS at 02:05

## 2018-05-25 RX ADMIN — SODIUM BICARBONATE 50 MEQ: 84 INJECTION INTRAVENOUS at 04:05

## 2018-05-25 RX ADMIN — FUROSEMIDE 10 MG: 10 INJECTION, SOLUTION INTRAMUSCULAR; INTRAVENOUS at 09:05

## 2018-05-25 RX ADMIN — FAMOTIDINE 20 MG: 10 INJECTION INTRAVENOUS at 08:05

## 2018-05-25 RX ADMIN — CEFAZOLIN 2 G: 330 INJECTION, POWDER, FOR SOLUTION INTRAMUSCULAR; INTRAVENOUS at 02:05

## 2018-05-25 RX ADMIN — NEOSTIGMINE METHYLSULFATE 5 MG: 1 INJECTION INTRAVENOUS at 03:05

## 2018-05-25 RX ADMIN — IPRATROPIUM BROMIDE AND ALBUTEROL SULFATE 3 ML: .5; 3 SOLUTION RESPIRATORY (INHALATION) at 07:05

## 2018-05-25 NOTE — SUBJECTIVE & OBJECTIVE
PMH, PSH, FH, SH reviewed     Review of Systems  Unable to obtain due to: Sedation,Intubation,Altered mental status,Critical illness,Reviewed ROS from note dated 5/24/18 per Dr. Hawkins    Current Medications and/or Treatments Impacting Glycemic Control  Pressors:    Autonomic Drugs     Start     Stop Route Frequency Ordered    05/25/18 1600  epinephrine 4 mg in sodium chloride 0.9% 250 mL infusion     Question Answer Comment   Titrate by: (in mcg/kg/min) 0.05    Titrate interval: (in minutes) 5    Titrate to maintain: (SBP or MAP or Cardiac Index) CARDIAC INDEX    Cardiac index greater than: (in L/min) 2.2    Maximum dose: (in mcg/kg/min) 2        -- IV Continuous 05/25/18 1511 05/25/18 1600  norepinephrine 4 mg in dextrose 5% 250 mL infusion (premix) (titrating)     Question Answer Comment   Titrate by: (in mcg/kg/min) 0.05    Titrate interval: (in minutes) 5    Titrate to maintain: (MAP or SBP) MAP    Greater than: (in mmHg) 60    Maximum dose: (in mcg/kg/min) 3        -- IV Continuous 05/25/18 1511        Hyperglycemia/Diabetes Medications:   Antihyperglycemics     Start     Stop Route Frequency Ordered    05/25/18 1600  insulin regular (Humulin R) 100 Units in sodium chloride 0.9% 100 mL infusion      -- IV Continuous 05/25/18 1511         PHYSICAL EXAMINATION:  Vitals:    05/25/18 1600   BP:    Pulse: 79   Resp: 16   Temp: 97.4 °F (36.3 °C)     Body mass index is 23.06 kg/m².    Physical Exam   Constitutional:  Well developed, well nourished, NAD.  ENT: External ears no masses with nose patent  Neck:  Supple; trachea midline  Cardiovascular: balloon pump heard; + LE edema.     Lungs:  Normal effort; lungs anterior bilaterally clear to auscultation.  Abdomen:  Soft, no masses,  no hernias.  MS: No clubbing or cyanosis of nails noted;  unable to assess gait.  Skin: midsternal dsg C/D/I, CT x 3 in place; No rashes, lesions, or ulcers; no nodules.

## 2018-05-25 NOTE — PLAN OF CARE
Problem: Patient Care Overview  Goal: Plan of Care Review  Hx:  Mitral regurg (severe), Aortic regurg (mild), Pulm HTN, HTN   POC reviewed with pt and spouse. Pt to OR for MV replacement and CABG x1. Returned from OR at 1515 intubated and sedated. Pt follows commands, moves all extremities. Pt to remain intubated, on SIMV, 50%, 5 PEEP. On propfol, epi and insulin gtts. IABP at 1:2. Received 500 ml albumin and 1 amp bicarb. Trending lactics and ABG. VSS, will continue to monitior.

## 2018-05-25 NOTE — SUBJECTIVE & OBJECTIVE
Interval History: No acute events overnight. Got IABP yesterday. Plan for surgery today.    Medications:  Continuous Infusions:   heparin (porcine) in 5 % dex Stopped (05/25/18 0500)     Scheduled Meds:  PRN Meds:sodium chloride, sodium chloride, sodium chloride, AMINO ACID ENRICHED CARDIOPLEGIA SOLN (CARDIA REPERFUSATE) Dextrose-70% 17.3ml,Sterile water-102.7ml,CP2D soln-112.5ml,Tromethamine 0.3M-112.5ml,MSA/MSG 0.46M-125ml, KCl 2meq/ml-7.5ml Total volume (477.5ml), gelatin adsorbable 100cm top sponge, heparin (porcine), magnesium oxide, magnesium oxide, microplegia arrest solution (KCL 80mEq/40 mL), microplegia protection soln (LIDOCAINE 100mg/MAGNESIUM 4g/qs NS 40mL), papaverine, potassium chloride 10%, potassium chloride 10%, potassium chloride 10%, potassium, sodium phosphates, potassium, sodium phosphates, potassium, sodium phosphates, sodium chloride 0.9%     Objective:     Vital Signs (Most Recent):  Temp: 98.6 °F (37 °C) (05/25/18 0300)  Pulse: 87 (05/25/18 0700)  Resp: (!) 22 (05/25/18 0700)  BP: (!) 143/101 (05/25/18 0645)  SpO2: 97 % (05/25/18 0700) Vital Signs (24h Range):  Temp:  [97.5 °F (36.4 °C)-98.8 °F (37.1 °C)] 98.6 °F (37 °C)  Pulse:  [68-87] 87  Resp:  [11-28] 22  SpO2:  [92 %-100 %] 97 %  BP: (117-171)/() 143/101     Weight: 72.9 kg (160 lb 11.5 oz)  Body mass index is 23.06 kg/m².    SpO2: 97 %  O2 Device (Oxygen Therapy): room air    Intake/Output - Last 3 Shifts       05/23 0700 - 05/24 0659 05/24 0700 - 05/25 0659 05/25 0700 - 05/26 0659    P.O. 220 80     I.V. (mL/kg)  115 (1.6) 1300 (17.8)    Total Intake(mL/kg) 220 (3.4) 195 (2.7) 1300 (17.8)    Urine (mL/kg/hr) 526 625 (0.4) 760 (1.7)    Stool 1      Total Output 527 625 760    Net -307 -430 +540           Urine Occurrence 375 x            Lines/Drains/Airways     Peripherally Inserted Central Catheter Line                 PICC Double Lumen 03/05/18 1539 right basilic 80 days          Central Venous Catheter Line                  Percutaneous Central Line Insertion/Assessment - Quad lumen  05/25/18 0800 left subclavian less than 1 day         Percutaneous Central Line Insertion/Assessment - double lumen  05/25/18 0800 right internal jugular less than 1 day         Percutaneous Central Line Insertion/Assessment - quad lumen  05/25/18 0815 left subclavian less than 1 day         Pulmonary Artery Catheter Assessment  05/25/18 0745 less than 1 day          Drain                 Urethral Catheter 05/25/18 0830 Temperature probe;Straight-tip;Latex 14 Fr. less than 1 day          Airway                 Airway - Non-Surgical 05/25/18 0739 Endotracheal Tube less than 1 day          Arterial Line                 Arterial Line 05/25/18 0745 Left Brachial less than 1 day          Line                 IABP 05/24/18 1115 7.5 Fr. 40 mL 1 day          Peripheral Intravenous Line                 Peripheral IV - Single Lumen 05/23/18 1200 Right Forearm 2 days         Peripheral IV - Single Lumen 05/23/18 1700 Left Wrist 1 day                Physical Exam   Constitutional: He appears well-developed.   Cardiovascular: Normal rate and regular rhythm.    Pulmonary/Chest: Effort normal.   Neurological: He is alert.   Skin: Skin is warm and dry.       Significant Labs:  CBC:   Recent Labs  Lab 05/25/18  0440 05/25/18  1259   WBC 6.82  6.82  --   --    RBC 3.65*  3.65*  --   --    HGB 11.3*  11.3*  --   --    HCT 34.3*  34.3*  < > 21*   *  128*  --   --    MCV 94  94  --   --    MCH 31.0  31.0  --   --    MCHC 32.9  32.9  --   --    < > = values in this interval not displayed.  CMP:   Recent Labs  Lab 05/25/18  0440   *   CALCIUM 9.3   ALBUMIN 3.7   PROT 7.2      K 5.1   CO2 24      BUN 29*   CREATININE 1.3   ALKPHOS 72   ALT 19   AST 28   BILITOT 0.8     Coagulation:   Recent Labs  Lab 05/25/18 0440 05/25/18  0919   INR 1.1  --    APTT 41.4* <21.0

## 2018-05-25 NOTE — ANESTHESIA PROCEDURE NOTES
[place] [facility]    Diagnosis: endocarditis  Patient location during procedure: done in OR  Procedure start time: 5/25/2018 7:45 AM  Timeout: 5/25/2018 7:45 AM  Procedure end time: 5/25/2018 8:00 AM  Staffing  Anesthesiologist: IVETTE HUERTA  Resident/CRNA: AREN LEBRON  Performed: resident/CRNA   Anesthesiologist was present at the time of the procedure.  Preanesthetic Checklist  Completed: patient identified, site marked, surgical consent, pre-op evaluation, timeout performed, IV checked, risks and benefits discussed, monitors and equipment checked and anesthesia consent given  [place] [facility]  Skin Prep: chlorhexidine gluconate  Local Infiltration: none  Location: right,  internal jugular vein  Vessel Caliber: medium, patent, compressibility normal  Introducer: 9 Fr single lumen, manometry used.  Device: standard thermodilution catheter  Catheter Size: 8 Fr  Catheter placement by yes. Heme positive aspiration all ports. PAC floated with balloon up until wedgedSterile sheath used  Locked at: 60 cm.Insertion Attempts: 1  Indication: hemodynamic monitoring  Ultrasound Guidance  Needle advanced into vessel with real time Ultrasound guidance.  Guidewire confirmed in vessel.  Sterile sheath used.  Assessment  Central Line Bundle Protocol followed. Hand hygiene before procedure, surgical cap worn, surgical mask worn, sterile surgical gloves worn, large sterile drape used.  Verification: ultrasound  Dressing: sutured in place and taped  Patient: Tolerated Well

## 2018-05-25 NOTE — PROGRESS NOTES
[facility]  Critical Care - Surgery  Progress Note    Patient Name: Rodolfo Messina  MRN: [id number]  Admission Date: 5/23/2018  Hospital Length of Stay: 2 days  Code Status: Full Code  Attending Provider: Marvin Go MD  Primary Care Provider: Deric Claudio MD   Principal Problem: <principal problem not specified>    Subjective:     Interval History/Significant Events: Patient seen and examined this AM. Had balloon pump placed yesterday. Plan to go to OR today.     Follow-up For: Procedure(s) (LRB):  Mitral Valve Replacement (N/A)  REPAIR-VALVE-TRICUSPID (N/A)  REPLACEMENT-VALVE-AORTIC (N/A)  AORTOCORONARY BYPASS-CABG (N/A)    Post-Operative Day: Day of Surgery    Objective:     Vital Signs (Most Recent):  Temp: 98.6 °F (37 °C) (05/25/18 0300)  Pulse: 78 (05/25/18 0618)  Resp: (!) 25 (05/25/18 0618)  BP: (!) 153/107 (05/25/18 0618)  SpO2: 95 % (05/25/18 0618) Vital Signs (24h Range):  Temp:  [97.5 °F (36.4 °C)-98.8 °F (37.1 °C)] 98.6 °F (37 °C)  Pulse:  [68-85] 78  Resp:  [10-28] 25  SpO2:  [92 %-100 %] 95 %  BP: (117-171)/() 153/107     Weight: 72.9 kg (160 lb 11.5 oz)  Body mass index is 23.06 kg/m².      Intake/Output Summary (Last 24 hours) at 05/25/18 0626  Last data filed at 05/25/18 0500   Gross per 24 hour   Intake              195 ml   Output              625 ml   Net             -430 ml       Physical Exam   Constitutional: He appears well-developed.   Temporal wasting   Cardiovascular: Normal rate and regular rhythm.    Murmur heard.  Pulmonary/Chest: Effort normal.   Neurological: He is alert.   Skin: Skin is warm and dry.          Lines/Drains/Airways     Peripherally Inserted Central Catheter Line                 PICC Double Lumen 03/05/18 1539 right basilic 80 days          Line                 IABP 05/24/18 1115 7.5 Fr. 40 mL less than 1 day          Peripheral Intravenous Line                 Peripheral IV - Single Lumen 05/23/18 1200 Right Forearm 1 day          Peripheral IV - Single Lumen 05/23/18 1700 Left Wrist 1 day                Significant Labs:    CBC/Anemia Profile:    Recent Labs  Lab 05/23/18  1236 05/24/18  0300 05/25/18  0440   WBC 5.13 4.72 6.82  6.82   HGB 11.6* 9.4* 11.3*  11.3*   HCT 35.6* 27.9* 34.3*  34.3*    130* 128*  128*   MCV 96 94 94  94   RDW 14.0 14.0 13.6  13.6        Chemistries:    Recent Labs  Lab 05/23/18  1236 05/24/18  0300 05/25/18  0440    142 139   K 4.0 3.6 5.1    109 108   CO2 27 26 24   BUN 34* 34* 29*   CREATININE 1.7* 1.4 1.3   CALCIUM 10.1 8.9 9.3   ALBUMIN 4.1 3.3* 3.7   PROT 7.8 6.1 7.2   BILITOT 0.8 0.5 0.8   ALKPHOS 79 62 72   ALT 24 19 19   AST 25 16 28   MG 2.1 2.1 2.3   PHOS 4.7* 4.5 3.2     Assessment/Plan:     CHF (congestive heart failure)     66yo M with PMH of mitral valve endocarditis and resultant severe MR, TR and pulmonary HTN who is admitted to the SICU pre-operatively for IABP placement tomorrow (5/24) and has MVR, TV repair, AVR and CABG planned for Friday 5/25.     Plan:     Neuro:   -AAO x 3, no sedation required  -Pre-op, denies pain      Pulmonary:   -Extubated, saturating well on room air  -Continuous pulse ox  -Supplemental O2 prn     Cardiac:  -MAP goal >65  -HDS not requiring pressors   -Continuous cardiac monitoring  - s/p balloon pump placement on 5/24     Renal:   -UOP adequate  -Flomax daily given home use  -Bun/Cr 29/1.3 (baseline Cr 1.4-1.6)     Fluids/Electrolytes/Nutrition/GI:   -Nutritional status: NPO since midnight for procedure today  -replace lytes PRN     Hematology/Oncology:  -H/H 9.4/27.9, continue to monitor  -INR/Plts 1.1/130  -CBC daily     Infectious Disease:   -Afebrile  -WBC 6  -CBC daily   -Abx: none indicated currently     Endocrine:  - No h/o DM or thyroid dysfunction, continue to monitor  - Glucose goal of 120-180     Dispo:  -Continue care in the ICU setting  -CTS primary  - To OR today                       Critical care was time spent personally by me  on the following activities: development of treatment plan with patient or surrogate and bedside caregivers, discussions with consultants, evaluation of patient's response to treatment, examination of patient, ordering and performing treatments and interventions, ordering and review of laboratory studies, ordering and review of radiographic studies, pulse oximetry, re-evaluation of patient's condition.  This critical care time did not overlap with that of any other provider or involve time for any procedures.     Juan Ramon Rushing MD  Critical Care - Surgery  [facility]

## 2018-05-25 NOTE — CONSULTS
[facility]  Endocrinology  Diabetes Consult Note    Consult Requested by: Marvin Go MD   Reason for admit: Coronary artery disease of native artery of native heart with stable angina pectoris    HISTORY OF PRESENT ILLNESS:  Reason for Consult: Management of  Hyperglycemia     Surgical Procedure and Date: s/p CABG x 1, MVR  5/25/18    HPI: Patient is a 67 y.o. male with a diagnosis of mitral valve endocarditis and resultant severe MR, TR and pulmonary HTN. The etiology of his endocarditis remains unknown. He was admitted to the SICU pre-operatively for IABP placement 5/23/18. Underwent cardiac surgery 5/25/18.   No personal or family hx of DM  Lab Results   Component Value Date    HGBA1C 5.4 02/22/2018     Endocrine consulted for BG mgmt.             PMH, PSH, FH, SH reviewed     Review of Systems  Unable to obtain due to: Sedation,Intubation,Altered mental status,Critical illness,Reviewed ROS from note dated 5/24/18 per Dr. Hawkins      Received in ICU, intubated, IABP in place. IV insulin infusion in progress at 3 u/hr, .       Current Medications and/or Treatments Impacting Glycemic Control  Pressors:    Autonomic Drugs     Start     Stop Route Frequency Ordered    05/25/18 1600  epinephrine 4 mg in sodium chloride 0.9% 250 mL infusion     Question Answer Comment   Titrate by: (in mcg/kg/min) 0.05    Titrate interval: (in minutes) 5    Titrate to maintain: (SBP or MAP or Cardiac Index) CARDIAC INDEX    Cardiac index greater than: (in L/min) 2.2    Maximum dose: (in mcg/kg/min) 2        -- IV Continuous 05/25/18 1511    05/25/18 1600  norepinephrine 4 mg in dextrose 5% 250 mL infusion (premix) (titrating)     Question Answer Comment   Titrate by: (in mcg/kg/min) 0.05    Titrate interval: (in minutes) 5    Titrate to maintain: (MAP or SBP) MAP    Greater than: (in mmHg) 60    Maximum dose: (in mcg/kg/min) 3        -- IV Continuous 05/25/18 1511        Hyperglycemia/Diabetes  Medications:   Antihyperglycemics     Start     Stop Route Frequency Ordered    05/25/18 1600  insulin regular (Humulin R) 100 Units in sodium chloride 0.9% 100 mL infusion      -- IV Continuous 05/25/18 1511         PHYSICAL EXAMINATION:  Vitals:    05/25/18 1600   BP:    Pulse: 79   Resp: 16   Temp: 97.4 °F (36.3 °C)     Body mass index is 23.06 kg/m².    Physical Exam   Constitutional:  Well developed, well nourished, NAD.  ENT: External ears no masses with nose patent  Neck:  Supple; trachea midline  Cardiovascular: balloon pump heard; + LE edema.     Lungs:  Normal effort; lungs anterior bilaterally clear to auscultation.  Abdomen:  Soft, no masses,  no hernias.  MS: No clubbing or cyanosis of nails noted;  unable to assess gait.  Skin: midsternal dsg C/D/I, CT x 3 in place; No rashes, lesions, or ulcers; no nodules.          Labs Reviewed and Include     Recent Labs  Lab 05/25/18  0440 05/25/18  1510   * 160*   CALCIUM 9.3 8.8   ALBUMIN 3.7  --    PROT 7.2  --     145   K 5.1 3.6   CO2 24 20*    112*   BUN 29* 29*   CREATININE 1.3 1.6*   ALKPHOS 72  --    ALT 19  --    AST 28  --    BILITOT 0.8  --      Lab Results   Component Value Date    WBC 12.55 05/25/2018    HGB 9.5 (L) 05/25/2018    HCT 26 (L) 05/25/2018    MCV 94 05/25/2018     (L) 05/25/2018     No results for input(s): TSH, FREET4 in the last 168 hours.  Lab Results   Component Value Date    HGBA1C 5.4 02/22/2018       Nutritional status:   Body mass index is 23.06 kg/m².  Lab Results   Component Value Date    ALBUMIN 3.7 05/25/2018    ALBUMIN 3.3 (L) 05/24/2018    ALBUMIN 4.1 05/23/2018     Lab Results   Component Value Date    PREALBUMIN 12 (L) 02/22/2018       Estimated Creatinine Clearance: 46.2 mL/min (A) (based on SCr of 1.6 mg/dL (H)).    Accu-Checks  Recent Labs      05/23/18   1238  05/25/18   1513  05/25/18   1605   POCTGLUCOSE  105  151*  123*        ASSESSMENT and PLAN    Acute hyperglycemia    BG goal 110-140    Continue IV insulin infusion protocol  Requires intensive BG q1hr monitoring while on protocol             Chronic systolic congestive heart failure    Per CTS  avoid hypoglycemia            Coronary artery disease of native artery of native heart with stable angina pectoris    Per CTS  avoid hypoglycemia          S/P MVR (mitral valve replacement)    Per CTS  avoid hypoglycemia          S/P CABG x 1    Per CTS  avoid hypoglycemia              Plan discussed with RNs at bedside.     FREYA Ring,ANP-C  Endocrinology  [facility]

## 2018-05-25 NOTE — ASSESSMENT & PLAN NOTE
BG goal 110-140   Continue IV insulin infusion protocol  Requires intensive BG q1hr monitoring while on protocol

## 2018-05-25 NOTE — PLAN OF CARE
Problem: Patient Care Overview  Goal: Plan of Care Review  Hx:  Mitral regurg (severe), Aortic regurg (mild), Pulm HTN, HTN   Outcome: Ongoing (interventions implemented as appropriate)  POC reviewed with patient and spouse who verbalized understanding. Questions encouraged and answered. Pt has been NPO since midnight. Pt denies pain throughout shift. Pt HOB flat, supine. Voiding via urinal. Pt remained free from falls throughout the shift. IABP settings 1:1 ECG, augmented elevating throughout shift, called MD see notes for more details. APTT within therapeutic range.  VSS, will continue to monitor.

## 2018-05-25 NOTE — PLAN OF CARE
Pt. is scheduled for surgery today. D/C needs to be determined in future. SW/CM will continue to follow.

## 2018-05-25 NOTE — OP NOTE
DATE OF PROCEDURE:  05/25/2018    PREOPERATIVE DIAGNOSES:  1.  Acute mitral endocarditis.  2.  Severe mitral regurgitation.  3.  Mild to moderate aortic insufficiency.  4.  Coronary artery disease.  5.  Low ejection fraction.  6.  Heart failure, acute on chronic, systolic and diastolic heart failure.  7.  Status post intraaortic balloon pump.    POSTOPERATIVE DIAGNOSES:  1.  Acute mitral endocarditis.  2.  Severe mitral regurgitation.  3.  Mild to moderate aortic insufficiency.  4.  Coronary artery disease.  5.  Low ejection fraction.  6.  Heart failure, acute on chronic, systolic and diastolic heart failure.  7.  Status post intraaortic balloon pump.    OPERATIONS:  1.  Mitral valve replacement.  2.  CABG x1 (LIMA-LAD).  3.  Takedown of the left internal mammary artery, skeletonized technique.    STAFF SURGEON:  Marvin Go M.D.    FIRST ASSISTANT:  Christoph Saldaña M.D. (RES)    SECOND ASSISTANT:  Lauren Giang.    ANESTHESIA:  GETA.    ESTIMATED BLOOD LOSS:  200 mL.    KEY FINDINGS OF THE OPERATION:  1.  Extreme destruction of the mitral valve.  Both anterior and posterior   leaflets with large vegetations, requiring almost near complete excision of the   anterior and the posterior leaflets with maintenance of subvalvular apparatus.  2.  Good quality of left internal mammary artery with excellent flow.  3.  Good hemostasis.  4.  Aortic insufficiency was mild as well as the mitral insufficiency and hence   no intervention was done.    INDICATION OF OPERATION:  This is a 67-year-old debilitated patient who was   admitted with acute endocarditis in the hospital.  The patient was wheelchair   bound.  The patient over the last ____ completed his antibiotic course and was   improved significantly and his nutritional status and was maintained on   guideline directed heart failure medications and now a decision was made to   proceed for replacement or repair of his mitral valve as well as other valves.    Risks,  benefits, and alternatives were discussed.  The patient was brought   electively to the hospital and placed with an intraaortic balloon pump to help   in unloading of the heart, especially with the severe mitral regurgitation,   fluid overload, to ensure diuresis, and improvement of end organ function.  Once   that was done, an informed consent was obtained for the operation.    DESCRIPTION OF OPERATION:  The patient was brought to the Operating Room and   placed in a supine position.  After induction of anesthesia, area was prepped   and draped in the usual sterile fashion.  Previously placed midline incision was   made, which was carried all the way down to the sternum.  A median sternotomy   was then performed.  A chest retractor was then placed and the pericardium was   then opened up.  A Rultract retractor was placed on the left side and the left   internal mammary artery was then taken down in a skeletonized fashion from the   origin all the way down to the bifurcation.  Systemic heparinization was carried   out.  Once ACT greater than 450 was obtained, the distal portion of the left   internal mammary artery was clipped and ligated.  It was prepared with   papaverine to remove any vasospasm.  Cannulation stitches were placed.  Arterial   cannula was in the ascending aorta.  Venous cannula was placed in the SVC and   the IVC to obtain a bicaval cannulation strategy.  Circumferential dissection of   the SVC and the IVC was done and placement of caval tapes was down.  Once that   was done, the patient was placed on full cardiopulmonary bypass.  Antegrade   cardioplegia catheter was placed in the ascending aorta.  Cross clamp was   applied and a cardiac standstill was obtained.  Caval tapes were cinched down.    The right atrium was opened up.  Direct visualization of the coronary sinus was   done and cannulation of the cardioplegia catheter was done to deliver   cardioplegia.  Once that was achieved, the septum  was opened.  The interatrial   septum was incised and the mitral retractor was placed for exposure.  The   incision was extended into the dome of the left atrium.  Once that was achieved,   visualization of the anterior and the posterior leaflet of the mitral valve was   carried out.  Extensive destruction and vegetation both of the leaflets was   present.  The anterior leaflet was completely excised.  However, we maintained   the subvalvular apparatus of both the anterior and the posterior leaflet.  In   the posterior leaflets, P1 and P3 were retained, but P2 had to be completely   excised because of large vegetation that was present.  Multiple pledgeted   everting stitches were placed around the annulus and tacking the subvalvular   structures with the sutures.  After sizing physiologically, a 27 bioprosthetic   valve was found to be a good fit.  A 27 Medtronic valve was brought to the field   and prepared.  The needles were passed through the sewing ring and cut and   passed off the field.  The sutures were tied down and cut.  Once that was done,   every 20 minutes, we were instilling cardioplegia for cardiac protection.  The   interatrial septum was closed using 4-0 Prolene pledgeted stitch in a continuous   running fashion.  The right atrium was then closed in a similar manner with a   4-0 Prolene stitch.  Attention was then directed towards the mid portion of the   LAD.  The LAD was dissected out and opened up and in the meantime, the left   internal mammary artery was spatulated and prepared for anastomosis using a 7-0   Prolene stitch.  A continuous running anastomosis was performed.  Once that was   done, a dose of hot shot was given over 3 minutes, followed by removal of cross   clamp.  The root vent was used for de-airing.  Instant cardiac activity was   noted.  No intracardiac air was noted.  Ventilation was resumed.  Electrolytes   were found to be within normal limits.  Gradually, the patient was  weaned off   from cardiopulmonary bypass.  The patient  from cardiopulmonary bypass   without any issues.  Test dose of protamine was given followed by full dose of   protamine to reverse the effects of systemic heparin.  Midway dose, all the   various catheters and cannulas were removed.  No intracardiac air was noted.  No   paravalvular leak was noted.  No mitral regurgitation was noted.  No MS was   noted.  Aortic insufficiency was still mild as well as mild tricuspid   regurgitation.  No wall motion abnormality was noted.  At this stage, multiple   pacing wires were placed on the ventricular surfaces and brought through   separate skin incision.  Four drainage tubes were placed, two in the mediastinum   and one in each pleural space, and connected to Pleurovac for drainage   purposes.  Sternum was approximated with #6 stainless steel wires.  Skin and   subcutaneous tissues were closed in multiple layers.  A sterile dressing was   applied.  Terminal count of needles, sponges, and instrument was found to be   correct.    MEDICARE ATTESTATION:  I was present for all parts of the operation and Dr. Christoph Saldaña acted as my first assistant.      HAY  dd: 05/25/2018 16:11:59 (CDT)  td: 05/25/2018 18:01:38 (CDT)  Doc ID   #[id number]  Job ID #[id number]    CC:

## 2018-05-25 NOTE — ANESTHESIA PROCEDURE NOTES
Arterial    Diagnosis: endocarditis    Patient location during procedure: done in OR  Procedure start time: 5/25/2018 7:39 AM  Timeout: 5/25/2018 7:38 AM  Procedure end time: 5/25/2018 7:45 AM  Staffing  Anesthesiologist: IVETTE HUERTA  Resident/CRNA: MIC TOBIN  Performed: resident/CRNA   Anesthesiologist was present at the time of the procedure.  Preanesthetic Checklist  Completed: patient identified, site marked, surgical consent, pre-op evaluation, timeout performed, IV checked, risks and benefits discussed, monitors and equipment checked and anesthesia consent givenArterial  Skin Prep: chlorhexidine gluconate  Local Infiltration: none  Orientation: left  Location: brachial  Catheter Size: 20 G  Catheter placement by Ultrasound guidance. Heme positive aspiration all ports.  Vessel Caliber: medium  Needle advanced into vessel with real time Ultrasound guidance.  Guidewire confirmed in vessel.  Sterile sheath used.Insertion Attempts: 1  Assessment  Dressing: sutured in place and taped  Patient: Tolerated well

## 2018-05-25 NOTE — PROGRESS NOTES
Called MD r/t aPTT resulted 52.7 sec. Nursing communication stated range 40-50 sec. No new orders at this time continue with 800units/hr and recheck aPTT with AM labs. VSS, will continue to monitor.

## 2018-05-25 NOTE — PHYSICIAN QUERY
"PT Name: Rodolfo Messina  MR #: [id number]    Physician Query Form - Heart  Condition Clarification     CDS/: Negra Melara RN, CCDS             Contact information: [email or web address]  This form is a permanent document in the medical record.     Query Date: May 25, 2018    By submitting this query, we are merely seeking further clarification of documentation. Please utilize your independent clinical judgment when addressing the question(s) below.    The medical record contains the following   Indicators     Supporting Clinical Findings Location in Medical Record    BNP     X EF Ef 60% 5/24 consult note    X Radiology findings Near complete resolution of previous right lung consolidation 5/23 cxr    Echo Results      "Ascites" documented      "SOB" or "HAQ" documented      "Hypoxia" documented     X Heart Failure documented CHF 5/23 cc h/p, 5/24 cts h/p   X "Edema" documented  He repots 1+ bilateral LE edema, but denies orthopnea or PND. 5/24 cts h/p   X Diuretics/Meds PTA meds:  Lasix 40 mg po daily 5/24 cts h/p    Treatment:      Other:          Provider, please specify diagnosis or diagnoses associated with above clinical findings.                               [  ] Chronic Diastolic Heart Failure (EF > 40)*  [ x ] Other Type of Heart Failure (please specify type): chronic systolic heart failure due to mitral regurg  [  ] Heart Failure Ruled Out  [  ] Other (please specify): ___________________________________  [  ] Clinically Undetermined            *American Heart Association                                                                                                          Please document in your progress notes daily for the duration of treatment until resolved and include in your discharge summary.    "

## 2018-05-25 NOTE — TRANSFER OF CARE
"Anesthesia Transfer of Care Note    Patient: Rodolfo Messina    Procedure(s) Performed: Procedure(s) (LRB):  Mitral Valve Replacement (N/A)  AORTOCORONARY BYPASS-CABG (N/A)    Patient location: ICU    Anesthesia Type: general    Transport from OR: Transported from OR intubated on 100% O2 by AMBU with adequate controlled ventilation    Post pain: adequate analgesia    Post assessment: no apparent anesthetic complications    Post vital signs: stable    Level of consciousness: sedated    Nausea/Vomiting: no nausea/vomiting    Complications: none    Transfer of care protocol was followed      Last vitals:   Visit Vitals  BP (!) 101/55 (BP Location: Left arm, Patient Position: Lying)   Pulse 80   Temp 36.4 °C (97.6 °F) (Core ([place]))   Resp 17   Ht 5' 10" (1.778 m)   Wt 72.9 kg (160 lb 11.5 oz)   SpO2 98%   BMI 23.06 kg/m²     "

## 2018-05-25 NOTE — PROGRESS NOTES
Called resident r/t augmentation diastolic continues to raise. Augmentation diastolic on IABP 130-157. SBP<160. No new orders at this time stated will continue to watch. VSS will; continue to monitor closely.

## 2018-05-25 NOTE — ANESTHESIA PROCEDURE NOTES
Final SOWMYA     Diagnosis: mitral regurgitation  Patient location during procedure: OR  Procedure start time: 5/25/2018 1:12 PM  Timeout: 5/25/2018 1:10 PM  Procedure end time: 5/25/2018 1:50 PM  Exam type: Final  Staffing  Anesthesiologist: IVETTE HUERTA  Resident/CRNA: RAJESH LOPEZ  Other anesthesia staff: AREN LEBRON  Performed: anesthesiologist and resident/CRNA   Preanesthetic Checklist  Completed: patient identified, surgical consent, pre-op evaluation, timeout performed, risks and benefits discussed, monitors and equipment checked, anesthesia consent given, oxygen available, suction available, hand hygiene performed and patient being monitored  Setup & Induction  Patient preparation: bite block inserted  Probe Insertion: easy  Exam: complete    Exam  Left Atrium: normal   Estimated Ejection Fraction: > 55% normal  Regional Wall Abnormalities: no RWMA        Right Heart  Right Ventricle dilated: no  Right Ventricle Function: normal      Aortic Valve:  Prosthesis: none  Perivalvular leak: no  Regurgitation(color flow): 1+     Mitral Valve:  Prosthesis: bioprosthetic  Perivalvlular Leak: no  Regurgitation(color flow): 0-tr   Mitral Stenosis Mean Gradient: 5 mmhg    Tricuspid Valve:  Prosthesis: none  Pervalvular Leak: no  Regurgitation: 1+    Pulmonic Valve:  Prosthesis: none  Regurgitation(color flow): 0-tr  Perivalvular Leak: no      Aorta  Descending Aorta dissection: no  Descending aorta IABP: yes  Aortic Arch Dissection: no  Ascending Aorta Dissection: no    LVAD:no        Effusions    Summary    Other Findings  Mildly depressed LV function s/p CABGx1 MVR.   Biprosthetic MV well seated, no perivalvular leak.   No change in other valvular abnormalities from baseline.  Mild AI, Mild TR.  Normal RV size and function.

## 2018-05-25 NOTE — PROGRESS NOTES
[facility]  Cardiothoracic Surgery  Progress Note    Patient Name: Rodolfo Messina  MRN: [id number]  Admission Date: 5/23/2018  Hospital Length of Stay: 2 days  Code Status: Full Code   Attending Physician: Marvin Go MD   Referring Provider: Marvin Go MD  Principal Problem:<principal problem not specified>    Subjective:     Post-Op Info:  Procedure(s) (LRB):  Mitral Valve Replacement (N/A)  REPAIR-VALVE-TRICUSPID (N/A)  AORTOCORONARY BYPASS-CABG (N/A)   Day of Surgery     Interval History: No acute events overnight. Got IABP yesterday. Plan for surgery today.    Medications:  Continuous Infusions:   heparin (porcine) in 5 % dex Stopped (05/25/18 0500)     Scheduled Meds:  PRN Meds:sodium chloride, sodium chloride, sodium chloride, AMINO ACID ENRICHED CARDIOPLEGIA SOLN (CARDIA REPERFUSATE) Dextrose-70% 17.3ml,Sterile water-102.7ml,CP2D soln-112.5ml,Tromethamine 0.3M-112.5ml,MSA/MSG 0.46M-125ml, KCl 2meq/ml-7.5ml Total volume (477.5ml), gelatin adsorbable 100cm top sponge, heparin (porcine), magnesium oxide, magnesium oxide, microplegia arrest solution (KCL 80mEq/40 mL), microplegia protection soln (LIDOCAINE 100mg/MAGNESIUM 4g/qs NS 40mL), papaverine, potassium chloride 10%, potassium chloride 10%, potassium chloride 10%, potassium, sodium phosphates, potassium, sodium phosphates, potassium, sodium phosphates, sodium chloride 0.9%     Objective:     Vital Signs (Most Recent):  Temp: 98.6 °F (37 °C) (05/25/18 0300)  Pulse: 87 (05/25/18 0700)  Resp: (!) 22 (05/25/18 0700)  BP: (!) 143/101 (05/25/18 0645)  SpO2: 97 % (05/25/18 0700) Vital Signs (24h Range):  Temp:  [97.5 °F (36.4 °C)-98.8 °F (37.1 °C)] 98.6 °F (37 °C)  Pulse:  [68-87] 87  Resp:  [11-28] 22  SpO2:  [92 %-100 %] 97 %  BP: (117-171)/() 143/101     Weight: 72.9 kg (160 lb 11.5 oz)  Body mass index is 23.06 kg/m².    SpO2: 97 %  O2 Device (Oxygen Therapy): room air    Intake/Output - Last 3 Shifts       05/23 0700 - 05/24  0659 05/24 0700 - 05/25 0659 05/25 0700 - 05/26 0659    P.O. 220 80     I.V. (mL/kg)  115 (1.6) 1300 (17.8)    Total Intake(mL/kg) 220 (3.4) 195 (2.7) 1300 (17.8)    Urine (mL/kg/hr) 526 625 (0.4) 760 (1.7)    Stool 1      Total Output 527 625 760    Net -307 -430 +540           Urine Occurrence 375 x            Lines/Drains/Airways     Peripherally Inserted Central Catheter Line                 PICC Double Lumen 03/05/18 1539 right basilic 80 days          Central Venous Catheter Line                 Percutaneous Central Line Insertion/Assessment - Quad lumen  05/25/18 0800 left subclavian less than 1 day         Percutaneous Central Line Insertion/Assessment - double lumen  05/25/18 0800 right internal jugular less than 1 day         Percutaneous Central Line Insertion/Assessment - quad lumen  05/25/18 0815 left subclavian less than 1 day         Pulmonary Artery Catheter Assessment  05/25/18 0745 less than 1 day          Drain                 Urethral Catheter 05/25/18 0830 Temperature probe;Straight-tip;Latex 14 Fr. less than 1 day          Airway                 Airway - Non-Surgical 05/25/18 0739 Endotracheal Tube less than 1 day          Arterial Line                 Arterial Line 05/25/18 0745 Left Brachial less than 1 day          Line                 IABP 05/24/18 1115 7.5 Fr. 40 mL 1 day          Peripheral Intravenous Line                 Peripheral IV - Single Lumen 05/23/18 1200 Right Forearm 2 days         Peripheral IV - Single Lumen 05/23/18 1700 Left Wrist 1 day                Physical Exam   Constitutional: He appears well-developed.   Cardiovascular: Normal rate and regular rhythm.    Pulmonary/Chest: Effort normal.   Neurological: He is alert.   Skin: Skin is warm and dry.       Significant Labs:  CBC:   Recent Labs  Lab 05/25/18  0440  05/25/18  1259   WBC 6.82  6.82  --   --    RBC 3.65*  3.65*  --   --    HGB 11.3*  11.3*  --   --    HCT 34.3*  34.3*  < > 21*   *  128*  --   --     MCV 94  94  --   --    MCH 31.0  31.0  --   --    MCHC 32.9  32.9  --   --    < > = values in this interval not displayed.  CMP:   Recent Labs  Lab 05/25/18  0440   *   CALCIUM 9.3   ALBUMIN 3.7   PROT 7.2      K 5.1   CO2 24      BUN 29*   CREATININE 1.3   ALKPHOS 72   ALT 19   AST 28   BILITOT 0.8     Coagulation:   Recent Labs  Lab 05/25/18  0440 05/25/18  0919   INR 1.1  --    APTT 41.4* <21.0     Assessment/Plan:     CHF (congestive heart failure)    - IABP 5/24/18  - NPO for procedure  - Daily labs  - Plan for surgery 5/25/18            Lupe Hawkins MD  Cardiothoracic Surgery  [facility]

## 2018-05-25 NOTE — SUBJECTIVE & OBJECTIVE
Interval History/Significant Events: Patient seen and examined this AM. Had balloon pump placed yesterday. Plan to go to OR today.     Follow-up For: Procedure(s) (LRB):  Mitral Valve Replacement (N/A)  REPAIR-VALVE-TRICUSPID (N/A)  REPLACEMENT-VALVE-AORTIC (N/A)  AORTOCORONARY BYPASS-CABG (N/A)    Post-Operative Day: Day of Surgery    Objective:     Vital Signs (Most Recent):  Temp: 98.6 °F (37 °C) (05/25/18 0300)  Pulse: 78 (05/25/18 0618)  Resp: (!) 25 (05/25/18 0618)  BP: (!) 153/107 (05/25/18 0618)  SpO2: 95 % (05/25/18 0618) Vital Signs (24h Range):  Temp:  [97.5 °F (36.4 °C)-98.8 °F (37.1 °C)] 98.6 °F (37 °C)  Pulse:  [68-85] 78  Resp:  [10-28] 25  SpO2:  [92 %-100 %] 95 %  BP: (117-171)/() 153/107     Weight: 72.9 kg (160 lb 11.5 oz)  Body mass index is 23.06 kg/m².      Intake/Output Summary (Last 24 hours) at 05/25/18 0626  Last data filed at 05/25/18 0500   Gross per 24 hour   Intake              195 ml   Output              625 ml   Net             -430 ml       Physical Exam   Constitutional: He appears well-developed.   Temporal wasting   Cardiovascular: Normal rate and regular rhythm.    Murmur heard.  Pulmonary/Chest: Effort normal.   Neurological: He is alert.   Skin: Skin is warm and dry.          Lines/Drains/Airways     Peripherally Inserted Central Catheter Line                 PICC Double Lumen 03/05/18 1539 right basilic 80 days          Line                 IABP 05/24/18 1115 7.5 Fr. 40 mL less than 1 day          Peripheral Intravenous Line                 Peripheral IV - Single Lumen 05/23/18 1200 Right Forearm 1 day         Peripheral IV - Single Lumen 05/23/18 1700 Left Wrist 1 day                Significant Labs:    CBC/Anemia Profile:    Recent Labs  Lab 05/23/18  1236 05/24/18  0300 05/25/18  0440   WBC 5.13 4.72 6.82  6.82   HGB 11.6* 9.4* 11.3*  11.3*   HCT 35.6* 27.9* 34.3*  34.3*    130* 128*  128*   MCV 96 94 94  94   RDW 14.0 14.0 13.6  13.6         Chemistries:    Recent Labs  Lab 05/23/18  1236 05/24/18  0300 05/25/18  0440    142 139   K 4.0 3.6 5.1    109 108   CO2 27 26 24   BUN 34* 34* 29*   CREATININE 1.7* 1.4 1.3   CALCIUM 10.1 8.9 9.3   ALBUMIN 4.1 3.3* 3.7   PROT 7.8 6.1 7.2   BILITOT 0.8 0.5 0.8   ALKPHOS 79 62 72   ALT 24 19 19   AST 25 16 28   MG 2.1 2.1 2.3   PHOS 4.7* 4.5 3.2

## 2018-05-25 NOTE — BRIEF OP NOTE
[facility]  Brief Operative Note    SUMMARY     Surgery Date: 5/25/2018     Surgeon(s) and Role:     * Christoph Saldaña MD - Fellow     * Marvin Go MD - Primary    Assisting Surgeon: None    Pre-op Diagnosis:  Mitral and aortic valve regurgitation [I08.0]    Post-op Diagnosis:  Post-Op Diagnosis Codes:     * Mitral and aortic valve regurgitation [I08.0]    Procedure(s) (LRB):  Mitral Valve Replacement (N/A)  AORTOCORONARY BYPASS-CABG (N/A)    Anesthesia: General    Description of the findings of the procedure: 27 mosaic mitral, orr lad    Estimated Blood Loss: 200 ml         Specimens:   Specimen (12h ago through future)    Start     Ordered    05/25/18 1240  Specimen to Pathology - Surgery  Once     Comments:  1) Mitral leaflets- perm      05/25/18 1240

## 2018-05-25 NOTE — ANESTHESIA PROCEDURE NOTES
Left Subclavian Central Line    Diagnosis: endocarditis  Patient location during procedure: done in OR  Procedure start time: 5/25/2018 8:00 AM  Timeout: 5/25/2018 7:45 AM  Procedure end time: 5/25/2018 8:15 AM  Staffing  Anesthesiologist: IVETTE HUERTA  Resident/CRNA: MIC TOBIN  Performed: resident/CRNA   Anesthesiologist was present at the time of the procedure.  Preanesthetic Checklist  Completed: patient identified, site marked, surgical consent, pre-op evaluation, timeout performed, IV checked, risks and benefits discussed, monitors and equipment checked and anesthesia consent given  Indication  Indication: vascular access, med administration     Anesthesia   general anesthesia    Central Line  Skin Prep: skin prepped with ChloraPrep, skin prep agent completely dried prior to procedure  maximum sterile barriers used during central venous catheter insertion  hand hygiene performed prior to central venous catheter insertion  Location: left subclavian,   Catheter type: quad lumen  Catheter Size: 8.5 Fr  Ultrasound: vascular probe with ultrasound  Vessel Caliber: medium, patent, compressibility normal  Needle advanced into vessel with real time Ultrasound guidance.  Guidewire confirmed in vessel.  Sterile sheath used.   Manometry: Venous cannualation confirmed by visual estimation of blood vessel pressure using manometry.  Insertion Attempts: 2   Securement:line sutured, chlorhexidine patch, sterile dressing applied and blood return through all ports     Post-Procedure

## 2018-05-25 NOTE — ANESTHESIA PROCEDURE NOTES
Baseline SOWMYA     Diagnosis: mitral regurgitation   Patient location during procedure: OR  Procedure start time: 5/25/2018 8:11 AM  Timeout: 5/25/2018 8:09 AM  Procedure end time: 5/25/2018 9:39 AM  Exam type: Baseline  Staffing  Anesthesiologist: IVETTE HUERTA  Resident/CRNA: RAJESH OLPEZ  Other anesthesia staff: AREN LEBRON  Performed: anesthesiologist, resident/CRNA and other anesthesia staff   Preanesthetic Checklist  Completed: patient identified, surgical consent, pre-op evaluation, timeout performed, risks and benefits discussed, monitors and equipment checked, anesthesia consent given, oxygen available, suction available, hand hygiene performed and patient being monitored  Setup & Induction  Patient preparation: bite block inserted  Probe Insertion: easy  Exam: complete

## 2018-05-26 LAB
ALLENS TEST: ABNORMAL
ANION GAP SERPL CALC-SCNC: 11 MMOL/L
ANION GAP SERPL CALC-SCNC: 12 MMOL/L
ANISOCYTOSIS BLD QL SMEAR: SLIGHT
APTT BLDCRRT: 28.5 SEC
BASOPHILS # BLD AUTO: 0.01 K/UL
BASOPHILS NFR BLD: 0.1 %
BUN SERPL-MCNC: 34 MG/DL
BUN SERPL-MCNC: 41 MG/DL
CALCIUM SERPL-MCNC: 8.3 MG/DL
CALCIUM SERPL-MCNC: 8.6 MG/DL
CHLORIDE SERPL-SCNC: 111 MMOL/L
CHLORIDE SERPL-SCNC: 111 MMOL/L
CO2 SERPL-SCNC: 21 MMOL/L
CO2 SERPL-SCNC: 22 MMOL/L
CREAT SERPL-MCNC: 1.7 MG/DL
CREAT SERPL-MCNC: 1.9 MG/DL
DELSYS: ABNORMAL
DIFFERENTIAL METHOD: ABNORMAL
EOSINOPHIL # BLD AUTO: 0 K/UL
EOSINOPHIL NFR BLD: 0 %
ERYTHROCYTE [DISTWIDTH] IN BLOOD BY AUTOMATED COUNT: 14.9 %
ERYTHROCYTE [SEDIMENTATION RATE] IN BLOOD BY WESTERGREN METHOD: 18 MM/H
ERYTHROCYTE [SEDIMENTATION RATE] IN BLOOD BY WESTERGREN METHOD: 21 MM/H
EST. GFR  (AFRICAN AMERICAN): 41.3 ML/MIN/1.73 M^2
EST. GFR  (AFRICAN AMERICAN): 47.2 ML/MIN/1.73 M^2
EST. GFR  (NON AFRICAN AMERICAN): 35.7 ML/MIN/1.73 M^2
EST. GFR  (NON AFRICAN AMERICAN): 40.8 ML/MIN/1.73 M^2
FIO2: 50
GLUCOSE SERPL-MCNC: 150 MG/DL
GLUCOSE SERPL-MCNC: 160 MG/DL
HCO3 UR-SCNC: 21.6 MMOL/L (ref 24–28)
HCO3 UR-SCNC: 22.4 MMOL/L (ref 24–28)
HCO3 UR-SCNC: 23.3 MMOL/L (ref 24–28)
HCO3 UR-SCNC: 23.4 MMOL/L (ref 24–28)
HCO3 UR-SCNC: 23.6 MMOL/L (ref 24–28)
HCO3 UR-SCNC: 24 MMOL/L (ref 24–28)
HCT VFR BLD AUTO: 26.3 %
HGB BLD-MCNC: 8.9 G/DL
HYPOCHROMIA BLD QL SMEAR: ABNORMAL
IMM GRANULOCYTES # BLD AUTO: 0.02 K/UL
IMM GRANULOCYTES NFR BLD AUTO: 0.2 %
INR PPP: 1.1
LDH SERPL L TO P-CCNC: 0.85 MMOL/L (ref 0.36–1.25)
LDH SERPL L TO P-CCNC: 0.9 MMOL/L (ref 0.36–1.25)
LDH SERPL L TO P-CCNC: 1.2 MMOL/L (ref 0.36–1.25)
LDH SERPL L TO P-CCNC: 1.95 MMOL/L (ref 0.36–1.25)
LDH SERPL L TO P-CCNC: 3.19 MMOL/L (ref 0.36–1.25)
LYMPHOCYTES # BLD AUTO: 0.4 K/UL
LYMPHOCYTES NFR BLD: 4.9 %
MAGNESIUM SERPL-MCNC: 2.5 MG/DL
MCH RBC QN AUTO: 31.1 PG
MCHC RBC AUTO-ENTMCNC: 33.8 G/DL
MCV RBC AUTO: 92 FL
MODE: ABNORMAL
MONOCYTES # BLD AUTO: 0.6 K/UL
MONOCYTES NFR BLD: 7 %
NEUTROPHILS # BLD AUTO: 7.5 K/UL
NEUTROPHILS NFR BLD: 87.8 %
NRBC BLD-RTO: 0 /100 WBC
OVALOCYTES BLD QL SMEAR: ABNORMAL
PCO2 BLDA: 29.4 MMHG (ref 35–45)
PCO2 BLDA: 31 MMHG (ref 35–45)
PCO2 BLDA: 31.5 MMHG (ref 35–45)
PCO2 BLDA: 34.4 MMHG (ref 35–45)
PCO2 BLDA: 34.5 MMHG (ref 35–45)
PCO2 BLDA: 37 MMHG (ref 35–45)
PEEP: 5
PH SMN: 7.41 [PH] (ref 7.35–7.45)
PH SMN: 7.42 [PH] (ref 7.35–7.45)
PH SMN: 7.44 [PH] (ref 7.35–7.45)
PH SMN: 7.44 [PH] (ref 7.35–7.45)
PH SMN: 7.49 [PH] (ref 7.35–7.45)
PH SMN: 7.52 [PH] (ref 7.35–7.45)
PIP: 16
PIP: 17
PIP: 17
PIP: 21
PLATELET # BLD AUTO: 77 K/UL
PLATELET BLD QL SMEAR: ABNORMAL
PMV BLD AUTO: 11.4 FL
PO2 BLDA: 102 MMHG (ref 80–100)
PO2 BLDA: 104 MMHG (ref 80–100)
PO2 BLDA: 110 MMHG (ref 80–100)
PO2 BLDA: 117 MMHG (ref 80–100)
PO2 BLDA: 121 MMHG (ref 80–100)
PO2 BLDA: 36 MMHG (ref 40–60)
POC BE: -1 MMOL/L
POC BE: -2 MMOL/L
POC BE: -3 MMOL/L
POC BE: 0 MMOL/L
POC BE: 0 MMOL/L
POC BE: 1 MMOL/L
POC SATURATED O2: 70 % (ref 95–100)
POC SATURATED O2: 98 % (ref 95–100)
POC SATURATED O2: 98 % (ref 95–100)
POC SATURATED O2: 99 % (ref 95–100)
POC TCO2: 23 MMOL/L (ref 23–27)
POC TCO2: 23 MMOL/L (ref 23–27)
POC TCO2: 24 MMOL/L (ref 23–27)
POC TCO2: 24 MMOL/L (ref 24–29)
POC TCO2: 25 MMOL/L (ref 23–27)
POC TCO2: 25 MMOL/L (ref 23–27)
POCT GLUCOSE: 102 MG/DL (ref 70–110)
POCT GLUCOSE: 103 MG/DL (ref 70–110)
POCT GLUCOSE: 106 MG/DL (ref 70–110)
POCT GLUCOSE: 108 MG/DL (ref 70–110)
POCT GLUCOSE: 116 MG/DL (ref 70–110)
POCT GLUCOSE: 119 MG/DL (ref 70–110)
POCT GLUCOSE: 122 MG/DL (ref 70–110)
POCT GLUCOSE: 123 MG/DL (ref 70–110)
POCT GLUCOSE: 123 MG/DL (ref 70–110)
POCT GLUCOSE: 124 MG/DL (ref 70–110)
POCT GLUCOSE: 126 MG/DL (ref 70–110)
POCT GLUCOSE: 131 MG/DL (ref 70–110)
POCT GLUCOSE: 132 MG/DL (ref 70–110)
POCT GLUCOSE: 138 MG/DL (ref 70–110)
POCT GLUCOSE: 138 MG/DL (ref 70–110)
POCT GLUCOSE: 139 MG/DL (ref 70–110)
POCT GLUCOSE: 140 MG/DL (ref 70–110)
POCT GLUCOSE: 144 MG/DL (ref 70–110)
POCT GLUCOSE: 146 MG/DL (ref 70–110)
POCT GLUCOSE: 149 MG/DL (ref 70–110)
POCT GLUCOSE: 151 MG/DL (ref 70–110)
POCT GLUCOSE: 159 MG/DL (ref 70–110)
POCT GLUCOSE: 90 MG/DL (ref 70–110)
POCT GLUCOSE: 96 MG/DL (ref 70–110)
POCT GLUCOSE: 98 MG/DL (ref 70–110)
POIKILOCYTOSIS BLD QL SMEAR: SLIGHT
POLYCHROMASIA BLD QL SMEAR: ABNORMAL
POTASSIUM SERPL-SCNC: 3.9 MMOL/L
POTASSIUM SERPL-SCNC: 4.2 MMOL/L
POTASSIUM SERPL-SCNC: 4.3 MMOL/L
PROTHROMBIN TIME: 11.7 SEC
PS: 10
RBC # BLD AUTO: 2.86 M/UL
SAMPLE: ABNORMAL
SITE: ABNORMAL
SODIUM SERPL-SCNC: 144 MMOL/L
SODIUM SERPL-SCNC: 144 MMOL/L
SP02: 100
VT: 500
VT: 500
VT: 532
VT: 564
WBC # BLD AUTO: 8.57 K/UL

## 2018-05-26 PROCEDURE — 27000221 HC OXYGEN, UP TO 24 HOURS

## 2018-05-26 PROCEDURE — 80048 BASIC METABOLIC PNL TOTAL CA: CPT

## 2018-05-26 PROCEDURE — 94003 VENT MGMT INPAT SUBQ DAY: CPT

## 2018-05-26 PROCEDURE — 37799 UNLISTED PX VASCULAR SURGERY: CPT

## 2018-05-26 PROCEDURE — 63600175 PHARM REV CODE 636 W HCPCS: Performed by: THORACIC SURGERY (CARDIOTHORACIC VASCULAR SURGERY)

## 2018-05-26 PROCEDURE — 83735 ASSAY OF MAGNESIUM: CPT | Mod: 91

## 2018-05-26 PROCEDURE — 85025 COMPLETE CBC W/AUTO DIFF WBC: CPT

## 2018-05-26 PROCEDURE — 99233 SBSQ HOSP IP/OBS HIGH 50: CPT | Mod: ,,, | Performed by: NURSE PRACTITIONER

## 2018-05-26 PROCEDURE — 25000242 PHARM REV CODE 250 ALT 637 W/ HCPCS: Performed by: THORACIC SURGERY (CARDIOTHORACIC VASCULAR SURGERY)

## 2018-05-26 PROCEDURE — 85730 THROMBOPLASTIN TIME PARTIAL: CPT

## 2018-05-26 PROCEDURE — 94640 AIRWAY INHALATION TREATMENT: CPT

## 2018-05-26 PROCEDURE — 25000003 PHARM REV CODE 250: Performed by: THORACIC SURGERY (CARDIOTHORACIC VASCULAR SURGERY)

## 2018-05-26 PROCEDURE — 82803 BLOOD GASES ANY COMBINATION: CPT

## 2018-05-26 PROCEDURE — 94150 VITAL CAPACITY TEST: CPT

## 2018-05-26 PROCEDURE — 83605 ASSAY OF LACTIC ACID: CPT

## 2018-05-26 PROCEDURE — 94761 N-INVAS EAR/PLS OXIMETRY MLT: CPT

## 2018-05-26 PROCEDURE — S0028 INJECTION, FAMOTIDINE, 20 MG: HCPCS | Performed by: THORACIC SURGERY (CARDIOTHORACIC VASCULAR SURGERY)

## 2018-05-26 PROCEDURE — 99900035 HC TECH TIME PER 15 MIN (STAT)

## 2018-05-26 PROCEDURE — 93005 ELECTROCARDIOGRAM TRACING: CPT

## 2018-05-26 PROCEDURE — 63600175 PHARM REV CODE 636 W HCPCS: Performed by: STUDENT IN AN ORGANIZED HEALTH CARE EDUCATION/TRAINING PROGRAM

## 2018-05-26 PROCEDURE — 99900026 HC AIRWAY MAINTENANCE (STAT)

## 2018-05-26 PROCEDURE — 80048 BASIC METABOLIC PNL TOTAL CA: CPT | Mod: 91

## 2018-05-26 PROCEDURE — 99900017 HC EXTUBATION W/PARAMETERS (STAT)

## 2018-05-26 PROCEDURE — 93010 ELECTROCARDIOGRAM REPORT: CPT | Mod: ,,, | Performed by: INTERNAL MEDICINE

## 2018-05-26 PROCEDURE — 84132 ASSAY OF SERUM POTASSIUM: CPT | Mod: 91

## 2018-05-26 PROCEDURE — 36592 COLLECT BLOOD FROM PICC: CPT

## 2018-05-26 PROCEDURE — 27000202 HC IABP, ADD'L DAY

## 2018-05-26 PROCEDURE — 85610 PROTHROMBIN TIME: CPT

## 2018-05-26 PROCEDURE — A4216 STERILE WATER/SALINE, 10 ML: HCPCS | Performed by: THORACIC SURGERY (CARDIOTHORACIC VASCULAR SURGERY)

## 2018-05-26 PROCEDURE — 99233 SBSQ HOSP IP/OBS HIGH 50: CPT | Mod: GC,,, | Performed by: ANESTHESIOLOGY

## 2018-05-26 PROCEDURE — 99900022

## 2018-05-26 PROCEDURE — 20000000 HC ICU ROOM

## 2018-05-26 PROCEDURE — P9045 ALBUMIN (HUMAN), 5%, 250 ML: HCPCS | Mod: JG | Performed by: THORACIC SURGERY (CARDIOTHORACIC VASCULAR SURGERY)

## 2018-05-26 PROCEDURE — 86850 RBC ANTIBODY SCREEN: CPT

## 2018-05-26 RX ORDER — FUROSEMIDE 10 MG/ML
20 INJECTION INTRAMUSCULAR; INTRAVENOUS ONCE
Status: COMPLETED | OUTPATIENT
Start: 2018-05-26 | End: 2018-05-26

## 2018-05-26 RX ADMIN — Medication 3 ML: at 06:05

## 2018-05-26 RX ADMIN — IPRATROPIUM BROMIDE AND ALBUTEROL SULFATE 3 ML: .5; 3 SOLUTION RESPIRATORY (INHALATION) at 03:05

## 2018-05-26 RX ADMIN — FAMOTIDINE 20 MG: 10 INJECTION INTRAVENOUS at 09:05

## 2018-05-26 RX ADMIN — OXYCODONE HYDROCHLORIDE 10 MG: 5 TABLET ORAL at 08:05

## 2018-05-26 RX ADMIN — NICARDIPINE HYDROCHLORIDE 5 MG/HR: 0.2 INJECTION, SOLUTION INTRAVENOUS at 07:05

## 2018-05-26 RX ADMIN — PROPOFOL 50 MCG/KG/MIN: 10 INJECTION, EMULSION INTRAVENOUS at 01:05

## 2018-05-26 RX ADMIN — IPRATROPIUM BROMIDE AND ALBUTEROL SULFATE 3 ML: .5; 3 SOLUTION RESPIRATORY (INHALATION) at 07:05

## 2018-05-26 RX ADMIN — MUPIROCIN 1 G: 20 OINTMENT TOPICAL at 09:05

## 2018-05-26 RX ADMIN — POTASSIUM CHLORIDE 20 MEQ: 200 INJECTION, SOLUTION INTRAVENOUS at 06:05

## 2018-05-26 RX ADMIN — PROPOFOL 35 MCG/KG/MIN: 10 INJECTION, EMULSION INTRAVENOUS at 09:05

## 2018-05-26 RX ADMIN — ASPIRIN 325 MG ORAL TABLET 325 MG: 325 PILL ORAL at 09:05

## 2018-05-26 RX ADMIN — NICARDIPINE HYDROCHLORIDE 7.5 MG/HR: 0.2 INJECTION, SOLUTION INTRAVENOUS at 01:05

## 2018-05-26 RX ADMIN — FUROSEMIDE 10 MG/HR: 10 INJECTION, SOLUTION INTRAMUSCULAR; INTRAVENOUS at 12:05

## 2018-05-26 RX ADMIN — CEFAZOLIN 2 G: 1 INJECTION, POWDER, FOR SOLUTION INTRAMUSCULAR; INTRAVENOUS at 02:05

## 2018-05-26 RX ADMIN — Medication 3 ML: at 09:05

## 2018-05-26 RX ADMIN — CEFAZOLIN 2 G: 1 INJECTION, POWDER, FOR SOLUTION INTRAMUSCULAR; INTRAVENOUS at 09:05

## 2018-05-26 RX ADMIN — ATORVASTATIN CALCIUM 40 MG: 20 TABLET, FILM COATED ORAL at 09:05

## 2018-05-26 RX ADMIN — SODIUM PHOSPHATE, MONOBASIC, MONOHYDRATE 15 MMOL: 276; 142 INJECTION, SOLUTION INTRAVENOUS at 01:05

## 2018-05-26 RX ADMIN — NICARDIPINE HYDROCHLORIDE 5 MG/HR: 0.2 INJECTION, SOLUTION INTRAVENOUS at 05:05

## 2018-05-26 RX ADMIN — FUROSEMIDE 20 MG: 10 INJECTION, SOLUTION INTRAMUSCULAR; INTRAVENOUS at 09:05

## 2018-05-26 RX ADMIN — IPRATROPIUM BROMIDE AND ALBUTEROL SULFATE 3 ML: .5; 3 SOLUTION RESPIRATORY (INHALATION) at 11:05

## 2018-05-26 RX ADMIN — CEFAZOLIN 2 G: 1 INJECTION, POWDER, FOR SOLUTION INTRAMUSCULAR; INTRAVENOUS at 05:05

## 2018-05-26 RX ADMIN — ALBUMIN HUMAN 500 ML: 0.05 INJECTION, SOLUTION INTRAVENOUS at 02:05

## 2018-05-26 RX ADMIN — Medication 3 ML: at 02:05

## 2018-05-26 NOTE — PLAN OF CARE
Problem: Patient Care Overview  Goal: Plan of Care Review  Hx:  Mitral regurg (severe), Aortic regurg (mild), Pulm HTN, HTN   Outcome: Ongoing (interventions implemented as appropriate)  Pt is s/p MV replacement, CABG X1. Sedated, follow commands. No indicators of pain, SOB or nausea observed. External pacer disconnected, pacer wires isolated and secured. HR 70s, MAP goal of 60-80, SpO2 >96% on SIMV 50%, 5 PEEP. Epi 0.02,  Cardene 5mg/hr,  propofol 40 mcg/kg/min, lasix 10mg/hr infusing . Accucheck every hr, Insulin gtt stopped per order protocol.  IABP removed, pulses and site checked every hr: strong palpable DP and TP pulses on both extremities, site without hematoma or bleeding. Restraints continued on upper extremities. Chest tube output documented in flow sheet. Lainez in place, lasix gtt started. No falls, trauma or injury noted. Plan to extubated around 1930. Plan of care reviewed with patient and family. No further questions at this time. No significant events. Family at bedside. Will continue to monitor.

## 2018-05-26 NOTE — PLAN OF CARE
Problem: Patient Care Overview  Goal: Plan of Care Review  Hx:  Mitral regurg (severe), Aortic regurg (mild), Pulm HTN, HTN   Outcome: Ongoing (interventions implemented as appropriate)  POC reviewed w pt and family. IABP remains in place. Pt remains intubated. SIMV 50%/5 peep. Propofol infusing. Following commands. Moving all extremeties. Afebrile. SVO2 78. CVP 13. 10mg lasix given once per Dr. Go. V wires connected to pacer. Paced @ 80 bpm. MAP 60-80. Epi and cardene infusing. Insulin infusing; accu checks q1h. NGT verified by xray; to LIWS. Pleural chest tubes with 70cc sanguineous output. Meds with 60cc sanguineous output. Trending ABG q4h. Restraints in place. All questions and concerns addressed. Will continue to monitor closely

## 2018-05-26 NOTE — PROGRESS NOTES
"[facility]  Endocrinology  Progress Note    Admit Date: 5/23/2018     Reason for Consult: Management of  Hyperglycemia     Surgical Procedure and Date: s/p CABG x 1, MVR  5/25/18    HPI: Patient is a 67 y.o. male with a diagnosis of mitral valve endocarditis and resultant severe MR, TR and pulmonary HTN. The etiology of his endocarditis remains unknown. He was admitted to the SICU pre-operatively for IABP placement 5/23/18. Underwent cardiac surgery 5/25/18.   No personal or family hx of DM  Lab Results   Component Value Date    HGBA1C 5.4 02/22/2018     Endocrine consulted for BG mgmt.             Interval HPI:   Overnight events: remains in ICU intubated, sedated, and on pressors. Plans to extubated this evening per RN note. IABP. on insulin infusion rates 1.5- 3 u/hr overnight. BG at goal with insulin infusion.   Eating:   NPO  Hypoglycemia and intervention: No  Fever: No  TPN and/or TF: No    BP (!) 112/56   Pulse 79   Temp 97.6 °F (36.4 °C) (Core ([place]))   Resp 18   Ht 5' 10" (1.778 m)   Wt 74.8 kg (164 lb 14.5 oz)   SpO2 100%   BMI 23.66 kg/m²       Labs Reviewed and Include      Recent Labs  Lab 05/25/18 2117 05/26/18  0330   * 160*   CALCIUM 8.8 8.6*   ALBUMIN 3.4*  --    PROT 5.5*  --     144   K 3.7 3.9   CO2 21* 22*   * 111*   BUN 31* 34*   CREATININE 1.6* 1.7*   ALKPHOS 51*  --    ALT 15  --    AST 77*  --    BILITOT 1.5*  --      Lab Results   Component Value Date    WBC 8.57 05/26/2018    HGB 8.9 (L) 05/26/2018    HCT 26.3 (L) 05/26/2018    MCV 92 05/26/2018    PLT 77 (L) 05/26/2018     No results for input(s): TSH, FREET4 in the last 168 hours.  Lab Results   Component Value Date    HGBA1C 5.4 02/22/2018       Nutritional status:   Body mass index is 23.66 kg/m².  Lab Results   Component Value Date    ALBUMIN 3.4 (L) 05/25/2018    ALBUMIN 3.7 05/25/2018    ALBUMIN 3.3 (L) 05/24/2018     Lab Results   Component Value Date    PREALBUMIN 12 (L) " 02/22/2018       Estimated Creatinine Clearance: 43.5 mL/min (A) (based on SCr of 1.7 mg/dL (H)).    Accu-Checks  Recent Labs      05/25/18   2323  05/26/18   0006  05/26/18   0113  05/26/18   0210  05/26/18   0319  05/26/18   0358  05/26/18   0508  05/26/18   0612  05/26/18   0710  05/26/18   0800   POCTGLUCOSE  119*  138*  149*  146*  151*  159*  126*  122*  103  98     Pressors:    Autonomic Drugs     Start     Stop Route Frequency Ordered    05/25/18 1600  epinephrine 4 mg in sodium chloride 0.9% 250 mL infusion     Question Answer Comment   Titrate by: (in mcg/kg/min) 0.05    Titrate interval: (in minutes) 5    Titrate to maintain: (SBP or MAP or Cardiac Index) CARDIAC INDEX    Cardiac index greater than: (in L/min) 2.2    Maximum dose: (in mcg/kg/min) 2        -- IV Continuous 05/25/18 1511    05/25/18 1600  norepinephrine 4 mg in dextrose 5% 250 mL infusion (premix) (titrating)     Question Answer Comment   Titrate by: (in mcg/kg/min) 0.05    Titrate interval: (in minutes) 5    Titrate to maintain: (MAP or SBP) MAP    Greater than: (in mmHg) 60    Maximum dose: (in mcg/kg/min) 3        -- IV Continuous 05/25/18 1511        Hyperglycemia/Diabetes Medications: Antihyperglycemics     Start     Stop Route Frequency Ordered    05/25/18 1600  insulin regular (Humulin R) 100 Units in sodium chloride 0.9% 100 mL infusion      -- IV Continuous 05/25/18 1511          ASSESSMENT and PLAN    * S/P MVR (mitral valve replacement)    Per CTS  avoid hypoglycemia          Acute hyperglycemia    BG goal 110-140     Continue IV insulin infusion protocol  Requires intensive BG q1hr monitoring while on protocol.  Will transition tomorrow if extubated and diet advanced.     ADDENDUM: insulin infusion suspended. Will change bg monitoring to q2 hours once bg at goal again. Restart insulin infusion if bg readings trend up while intubated; follow protocol.     Please page endocrine NP on call with any BG related issues or concerns.  Thanks.       Discharge plans-TBD             Coronary artery disease of native artery of native heart with stable angina pectoris    Per CTS  avoid hypoglycemia          S/P CABG x 1    Per CTS  avoid hypoglycemia          Chronic systolic congestive heart failure    Per CTS  avoid hypoglycemia                Lynn Canseco NP  Endocrinology  [facility]

## 2018-05-26 NOTE — PROGRESS NOTES
Dr. Go updated on marginal UOP since beginning Lasix gtt with CVP 8 flat/level. MD gave order to administer 500cc Albumin and to begin trending mag and K q6 hrs. MD also aware of IABP being out with strong palpable DP and PT pulses bilaterally. No signs of hematoma at insertion site. MD wants pt extubated around 6 hours post IABP removal which will be 1930 this evening. All orders repeated and will be carried out. Will continue to monitor closely.

## 2018-05-26 NOTE — ASSESSMENT & PLAN NOTE
66yo M with PMH of mitral valve endocarditis and resultant severe MR, TR and pulmonary HTN who is now s/p IABP placement (5/24) and s/pMVR, TV repair, AVR and CABG 5/25.     Plan:     Neuro:   -Sedated with propofol while intubated  -Pain control with fentanyl per CTS  -Daily sedation vacations     Pulmonary:   -Intubated  -Vent Mode: SIMV  Oxygen Concentration (%):  [] 50  Resp Rate Total:  [17 br/min-33 br/min] 18 br/min  Vt Set:  [400 mL-500 mL] 500 mL  PEEP/CPAP:  [5 cmH20] 5 cmH20  Pressure Support:  [10 cmH20] 10 cmH20  Mean Airway Pressure:  [6.3 cmH20-9.1 cmH20] 8.7 cmH20  -Wean to extubate when appropriate per CTS  -ABGs prn  ABG    Recent Labs  Lab 05/26/18  0512   PH 7.443   PO2 104*   PCO2 31.5*   HCO3 21.6*   BE -3     -Continuous pulse ox     Cardiac:  -MAP goal >65  -Epi and cardene gtts per CTS  -Continuous cardiac monitoring     Renal:   -UOP adequate  -Flomax daily given home use  -Bun/Cr 34/1.7 (baseline Cr 1.4-1.6)     Fluids/Electrolytes/Nutrition/GI:   -Nutritional status: NPO since midnight for procedure today  -replace lytes PRN  -Bowel regimen     Hematology/Oncology:  -H/H 8.9/26.3, stable post-op  -INR/Plts 1.1/77  -CBC daily     Infectious Disease:   -Afebrile  -WBC 8.54  -CBC daily   -Abx: ancef x 5 doses     Endocrine:  - No h/o DM or thyroid dysfunction  -Requiring insulin gtt for glucose control  - Glucose goal of 120-180     Dispo:  -Continue care in the ICU setting  -[facility] primary

## 2018-05-26 NOTE — PROGRESS NOTES
IABP removed per Dr. Hawkins. Pulses checked every 5 mins with doppler while removal and holding pressures, strong palpable pulses on both extremities. Site with gauze, clean dry Intact, no bleeding or hematoma. Will continue to monitor the pulses and IABP site closely.

## 2018-05-26 NOTE — ASSESSMENT & PLAN NOTE
BG goal 110-140     Continue bg monitoring to q4 hours  and moderate dose correction scale coverage.     ADDENDUM: change bg monitoring to q6 hour while NPO; will change to ac/hs when diet advanced      Please page endocrine NP on call with any BG related issues or concerns. Thanks.       Discharge plans-TBD

## 2018-05-26 NOTE — SUBJECTIVE & OBJECTIVE
"Interval HPI:   Overnight events: remains in ICU, IABP. Extubated yesterday evening. BG remains at goal without insulin.   Eating:   NPO; speech following to advance diet   Nausea: No  Hypoglycemia and intervention: No  Fever: No  TPN and/or TF: No    BP (!) 118/58   Pulse 79   Temp 98 °F (36.7 °C) (Oral)   Resp 17   Ht 5' 10" (1.778 m)   Wt 74.3 kg (163 lb 12.8 oz)   SpO2 96%   BMI 23.50 kg/m²     Labs Reviewed and Include      Recent Labs  Lab 05/27/18  0155  05/27/18  1132   *  --   --    CALCIUM 8.8  --   --      --   --    K 4.3  < > 4.1   CO2 24  --   --    *  --   --    BUN 47*  --   --    CREATININE 2.0*  --   --    < > = values in this interval not displayed.  Lab Results   Component Value Date    WBC 6.99 05/27/2018    HGB 7.9 (L) 05/27/2018    HCT 24.0 (L) 05/27/2018    MCV 93 05/27/2018    PLT 55 (L) 05/27/2018     No results for input(s): TSH, FREET4 in the last 168 hours.  Lab Results   Component Value Date    HGBA1C 5.4 02/22/2018       Nutritional status:   Body mass index is 23.66 kg/m².  Lab Results   Component Value Date    ALBUMIN 3.4 (L) 05/25/2018    ALBUMIN 3.7 05/25/2018    ALBUMIN 3.3 (L) 05/24/2018     Lab Results   Component Value Date    PREALBUMIN 12 (L) 02/22/2018       Estimated Creatinine Clearance: 43.5 mL/min (A) (based on SCr of 1.7 mg/dL (H)).    Accu-Checks  Recent Labs      05/26/18   1807  05/26/18   1917  05/26/18 2014 05/26/18   2303  05/27/18   0309  05/27/18   0438  05/27/18   0557  05/27/18   0712  05/27/18   0821  05/27/18   1131   POCTGLUCOSE  132*  140*  139*  138*  135*  129*  134*  123*  129*  118*       Current Medications and/or Treatments Impacting Glycemic Control  Immunotherapy:    Immunosuppressants     None        Steroids:   Hormones     None        Pressors:    Autonomic Drugs     Start     Stop Route Frequency Ordered    05/27/18 2100  metoprolol tartrate (LOPRESSOR) split tablet 12.5 mg      -- Oral 2 times daily 05/27/18 1235 "    05/25/18 1600  norepinephrine 4 mg in dextrose 5% 250 mL infusion (premix) (titrating)     Question Answer Comment   Titrate by: (in mcg/kg/min) 0.05    Titrate interval: (in minutes) 5    Titrate to maintain: (MAP or SBP) MAP    Greater than: (in mmHg) 60    Maximum dose: (in mcg/kg/min) 3        -- IV Continuous 05/25/18 1511        Hyperglycemia/Diabetes Medications:   Antihyperglycemics     Start     Stop Route Frequency Ordered    05/27/18 1054  insulin aspart U-100 pen 1-10 Units      -- SubQ Every 6 hours PRN 05/27/18 8776

## 2018-05-26 NOTE — PROGRESS NOTES
[facility]  Critical Care - Surgery  Progress Note    Patient Name: Rodolfo Messina  MRN: [id number]  Admission Date: 5/23/2018  Hospital Length of Stay: 3 days  Code Status: Full Code  Attending Provider: Marvin Go MD  Primary Care Provider: Deric Claudio MD   Principal Problem: Coronary artery disease of native artery of native heart with stable angina pectoris    Subjective:     Interval History/Significant Events: No acute events overnight, afebrile, vital signs stable. Patient remains intubated and sedated, follows commands off sedation. Vent settings 50%, PEEP 5. IABP remains in place. Paced at 80. Epi, cardene and insulin gtt. Chest tubes with appropriate sanguineous output.    Follow-up For: Procedure(s) (LRB):  Mitral Valve Replacement (N/A)  AORTOCORONARY BYPASS-CABG (N/A)    Post-Operative Day: 1 Day Post-Op    Objective:     Vital Signs (Most Recent):  Temp: 97.6 °F (36.4 °C) (05/26/18 0300)  Pulse: 79 (05/26/18 0600)  Resp: 18 (05/26/18 0600)  BP: (!) 109/58 (05/26/18 0600)  SpO2: 100 % (05/26/18 0600) Vital Signs (24h Range):  Temp:  [97.3 °F (36.3 °C)-97.6 °F (36.4 °C)] 97.6 °F (36.4 °C)  Pulse:  [75-87] 79  Resp:  [0-25] 18  SpO2:  [95 %-100 %] 100 %  BP: ()/() 109/58  Arterial Line BP: ()/(41-61) 103/52     Weight: 74.8 kg (164 lb 14.5 oz)  Body mass index is 23.66 kg/m².      Intake/Output Summary (Last 24 hours) at 05/26/18 0616  Last data filed at 05/26/18 0600   Gross per 24 hour   Intake             3676 ml   Output             2065 ml   Net             1611 ml       Physical Exam   Constitutional: He appears well-developed.   Cardiovascular: Normal rate and regular rhythm.    Pulmonary/Chest: Effort normal.   Neurological: He is alert.   Skin: Skin is warm and dry.     Vents:  Vent Mode: SIMV (05/26/18 0503)  Ventilator Initiated: Yes (05/25/18 1509)  Set Rate: 18 bmp (05/26/18 0503)  Vt Set: 500 mL (05/26/18 0503)  Pressure Support: 10 cmH20  (05/26/18 0503)  PEEP/CPAP: 5 cmH20 (05/26/18 0503)  Oxygen Concentration (%): 50 (05/26/18 0600)  Peak Airway Pressure: 17 cmH2O (05/26/18 0503)  Plateau Pressure: 0 cmH20 (05/26/18 0503)  Total Ve: 12.2 mL (05/26/18 0503)  F/VT Ratio<105 (RSBI): (!) 37.7 (05/26/18 0503)    Lines/Drains/Airways     Peripherally Inserted Central Catheter Line                 PICC Double Lumen 03/05/18 1539 right basilic 81 days          Central Venous Catheter Line                 Introducer 05/25/18 1400 right internal jugular less than 1 day         Percutaneous Central Line Insertion/Assessment - Quad lumen  05/25/18 0800 left subclavian less than 1 day         Percutaneous Central Line Insertion/Assessment - double lumen  05/25/18 0800 right internal jugular less than 1 day         Percutaneous Central Line Insertion/Assessment - quad lumen  05/25/18 0815 left subclavian less than 1 day         Pulmonary Artery Catheter Assessment  05/25/18 0745 less than 1 day          Drain                 Chest Tube 05/25/18 1515 1 Right Mediastinal less than 1 day         Chest Tube 05/25/18 1515 2 Left Mediastinal less than 1 day         NG/OG Tube 05/25/18 1549 Right nostril less than 1 day         Urethral Catheter 05/25/18 0830 Temperature probe;Straight-tip;Latex 14 Fr. less than 1 day         Y Chest Tube 3 and 4 05/25/18 1354 3 Right Pleural 32 Fr. 4 Left Pericardial 32 Fr. less than 1 day          Airway                 Airway - Non-Surgical 05/25/18 0739 Endotracheal Tube less than 1 day          Arterial Line                 Arterial Line 05/25/18 0745 Left Brachial less than 1 day          Line                 IABP 05/24/18 1115 7.5 Fr. 40 mL 1 day         Pacer Wires 05/25/18 1355 less than 1 day          Peripheral Intravenous Line                 Peripheral IV - Single Lumen 05/23/18 1200 Left Forearm 2 days         Peripheral IV - Single Lumen 05/23/18 1700 Left Wrist 2 days                Significant Labs:    CBC/Anemia  Profile:    Recent Labs  Lab 05/25/18  1510 05/25/18  1514 05/25/18 2117 05/26/18  0330   WBC 12.55  --  8.83 8.57   HGB 9.5*  --  9.5* 8.9*   HCT 29.4* 26* 28.5* 26.3*   *  --  82* 77*   MCV 94  --  93 92   RDW 14.3  --  14.6* 14.9*        Chemistries:    Recent Labs  Lab 05/25/18  0440 05/25/18  1510 05/25/18 2117 05/26/18  0330    145 145 144   K 5.1 3.6 3.7 3.9    112* 112* 111*   CO2 24 20* 21* 22*   BUN 29* 29* 31* 34*   CREATININE 1.3 1.6* 1.6* 1.7*   CALCIUM 9.3 8.8 8.8 8.6*   ALBUMIN 3.7  --  3.4*  --    PROT 7.2  --  5.5*  --    BILITOT 0.8  --  1.5*  --    ALKPHOS 72  --  51*  --    ALT 19  --  15  --    AST 28  --  77*  --    MG 2.3 2.9* 2.8*  --    PHOS 3.2 3.4 2.5*  --      Coagulation:   Recent Labs  Lab 05/26/18  0330   INR 1.1   APTT 28.5       Significant Imaging:  I have reviewed all pertinent imaging results/findings within the past 24 hours.    Assessment/Plan:     Chronic systolic congestive heart failure     68yo M with PMH of mitral valve endocarditis and resultant severe MR, TR and pulmonary HTN who is now s/p IABP placement (5/24) and s/pMVR, TV repair, AVR and CABG 5/25.     Plan:     Neuro:   -Sedated with propofol while intubated  -Pain control with fentanyl per CTS  -Daily sedation vacations     Pulmonary:   -Intubated  -Vent Mode: SIMV  Oxygen Concentration (%):  [] 50  Resp Rate Total:  [17 br/min-33 br/min] 18 br/min  Vt Set:  [400 mL-500 mL] 500 mL  PEEP/CPAP:  [5 cmH20] 5 cmH20  Pressure Support:  [10 cmH20] 10 cmH20  Mean Airway Pressure:  [6.3 cmH20-9.1 cmH20] 8.7 cmH20  -Wean to extubate when appropriate per CTS  -ABGs prn  ABG    Recent Labs  Lab 05/26/18  0512   PH 7.443   PO2 104*   PCO2 31.5*   HCO3 21.6*   BE -3     -Continuous pulse ox     Cardiac:  -MAP goal >65  -Epi and cardene gtts per CTS  -Continuous cardiac monitoring     Renal:   -UOP adequate  -Flomax daily given home use  -Bun/Cr 34/1.7 (baseline Cr  1.4-1.6)     Fluids/Electrolytes/Nutrition/GI:   -Nutritional status: NPO since midnight for procedure today  -replace lytes PRN  -Bowel regimen     Hematology/Oncology:  -H/H 8.9/26.3, stable post-op  -INR/Plts 1.1/77  -CBC daily     Infectious Disease:   -Afebrile  -WBC 8.54  -CBC daily   -Abx: ancef x 5 doses     Endocrine:  - No h/o DM or thyroid dysfunction  -Requiring insulin gtt for glucose control  - Glucose goal of 120-180     Dispo:  -Continue care in the ICU setting  -[facility] primary                     Critical care was time spent personally by me on the following activities: development of treatment plan with patient or surrogate and bedside caregivers, discussions with consultants, evaluation of patient's response to treatment, examination of patient, ordering and performing treatments and interventions, ordering and review of laboratory studies, ordering and review of radiographic studies, pulse oximetry, re-evaluation of patient's condition.  This critical care time did not overlap with that of any other provider or involve time for any procedures.     Pepper Chadwick MD  Critical Care - Surgery  [facility]

## 2018-05-26 NOTE — ASSESSMENT & PLAN NOTE
BG goal 110-140     Continue IV insulin infusion protocol  Requires intensive BG q1hr monitoring while on protocol.  Will transition tomorrow if extubated and diet advanced.     ADDENDUM: insulin infusion suspended. Will change bg monitoring to q2 hours once bg at goal again. Restart insulin infusion if bg readings trend up while intubated; follow protocol.     Please page endocrine NP on call with any BG related issues or concerns. Thanks.       Discharge plans-TBD

## 2018-05-26 NOTE — PROGRESS NOTES
Dr. Go updated on overall pt status. MD aware of neuro status, gtts, UOP, increase in creatnine, no significant response to 20mg lasix IVP, CVP 7-11, and plan to d/c IABP and extubate this evening. MD gave order for 10mg/hr Lasix continuous infusion with 20mg IVP. Once balloon pump d/c we will maintain Epi at 0.02mcg/kg/min. All orders read back to MD and will be carried out. Will continue to monitor closely.

## 2018-05-26 NOTE — SUBJECTIVE & OBJECTIVE
Interval History/Significant Events: No acute events overnight, afebrile, vital signs stable. Patient remains intubated and sedated, follows commands off sedation. Vent settings 50%, PEEP 5. IABP remains in place. Paced at 80. Epi, cardene and insulin gtt. Chest tubes with appropriate sanguineous output.    Follow-up For: Procedure(s) (LRB):  Mitral Valve Replacement (N/A)  AORTOCORONARY BYPASS-CABG (N/A)    Post-Operative Day: 1 Day Post-Op    Objective:     Vital Signs (Most Recent):  Temp: 97.6 °F (36.4 °C) (05/26/18 0300)  Pulse: 79 (05/26/18 0600)  Resp: 18 (05/26/18 0600)  BP: (!) 109/58 (05/26/18 0600)  SpO2: 100 % (05/26/18 0600) Vital Signs (24h Range):  Temp:  [97.3 °F (36.3 °C)-97.6 °F (36.4 °C)] 97.6 °F (36.4 °C)  Pulse:  [75-87] 79  Resp:  [0-25] 18  SpO2:  [95 %-100 %] 100 %  BP: ()/() 109/58  Arterial Line BP: ()/(41-61) 103/52     Weight: 74.8 kg (164 lb 14.5 oz)  Body mass index is 23.66 kg/m².      Intake/Output Summary (Last 24 hours) at 05/26/18 0616  Last data filed at 05/26/18 0600   Gross per 24 hour   Intake             3676 ml   Output             2065 ml   Net             1611 ml       Physical Exam   Constitutional: He appears well-developed.   Cardiovascular: Normal rate and regular rhythm.    Pulmonary/Chest: Effort normal.   Neurological: He is alert.   Skin: Skin is warm and dry.     Vents:  Vent Mode: SIMV (05/26/18 0503)  Ventilator Initiated: Yes (05/25/18 1509)  Set Rate: 18 bmp (05/26/18 0503)  Vt Set: 500 mL (05/26/18 0503)  Pressure Support: 10 cmH20 (05/26/18 0503)  PEEP/CPAP: 5 cmH20 (05/26/18 0503)  Oxygen Concentration (%): 50 (05/26/18 0600)  Peak Airway Pressure: 17 cmH2O (05/26/18 0503)  Plateau Pressure: 0 cmH20 (05/26/18 0503)  Total Ve: 12.2 mL (05/26/18 0503)  F/VT Ratio<105 (RSBI): (!) 37.7 (05/26/18 0503)    Lines/Drains/Airways     Peripherally Inserted Central Catheter Line                 PICC Double Lumen 03/05/18 1539 right basilic 81 days           Central Venous Catheter Line                 Introducer 05/25/18 1400 right internal jugular less than 1 day         Percutaneous Central Line Insertion/Assessment - Quad lumen  05/25/18 0800 left subclavian less than 1 day         Percutaneous Central Line Insertion/Assessment - double lumen  05/25/18 0800 right internal jugular less than 1 day         Percutaneous Central Line Insertion/Assessment - quad lumen  05/25/18 0815 left subclavian less than 1 day         Pulmonary Artery Catheter Assessment  05/25/18 0745 less than 1 day          Drain                 Chest Tube 05/25/18 1515 1 Right Mediastinal less than 1 day         Chest Tube 05/25/18 1515 2 Left Mediastinal less than 1 day         NG/OG Tube 05/25/18 1549 Right nostril less than 1 day         Urethral Catheter 05/25/18 0830 Temperature probe;Straight-tip;Latex 14 Fr. less than 1 day         Y Chest Tube 3 and 4 05/25/18 1354 3 Right Pleural 32 Fr. 4 Left Pericardial 32 Fr. less than 1 day          Airway                 Airway - Non-Surgical 05/25/18 0739 Endotracheal Tube less than 1 day          Arterial Line                 Arterial Line 05/25/18 0745 Left Brachial less than 1 day          Line                 IABP 05/24/18 1115 7.5 Fr. 40 mL 1 day         Pacer Wires 05/25/18 1355 less than 1 day          Peripheral Intravenous Line                 Peripheral IV - Single Lumen 05/23/18 1200 Left Forearm 2 days         Peripheral IV - Single Lumen 05/23/18 1700 Left Wrist 2 days                Significant Labs:    CBC/Anemia Profile:    Recent Labs  Lab 05/25/18  1510 05/25/18  1514 05/25/18 2117 05/26/18  0330   WBC 12.55  --  8.83 8.57   HGB 9.5*  --  9.5* 8.9*   HCT 29.4* 26* 28.5* 26.3*   *  --  82* 77*   MCV 94  --  93 92   RDW 14.3  --  14.6* 14.9*        Chemistries:    Recent Labs  Lab 05/25/18  0440 05/25/18  1510 05/25/18 2117 05/26/18  0330    145 145 144   K 5.1 3.6 3.7 3.9    112* 112* 111*   CO2 24 20* 21*  22*   BUN 29* 29* 31* 34*   CREATININE 1.3 1.6* 1.6* 1.7*   CALCIUM 9.3 8.8 8.8 8.6*   ALBUMIN 3.7  --  3.4*  --    PROT 7.2  --  5.5*  --    BILITOT 0.8  --  1.5*  --    ALKPHOS 72  --  51*  --    ALT 19  --  15  --    AST 28  --  77*  --    MG 2.3 2.9* 2.8*  --    PHOS 3.2 3.4 2.5*  --      Coagulation:   Recent Labs  Lab 05/26/18  0330   INR 1.1   APTT 28.5       Significant Imaging:  I have reviewed all pertinent imaging results/findings within the past 24 hours.

## 2018-05-26 NOTE — SUBJECTIVE & OBJECTIVE
"Interval HPI:   Overnight events: remains in ICU intubated, sedated, and on pressors. Plans to extubated this evening per RN note. IABP. on insulin infusion rates 1.5- 3 u/hr overnight. BG at goal with insulin infusion.   Eating:   NPO  Hypoglycemia and intervention: No  Fever: No  TPN and/or TF: No    BP (!) 112/56   Pulse 79   Temp 97.6 °F (36.4 °C) (Core ([place]))   Resp 18   Ht 5' 10" (1.778 m)   Wt 74.8 kg (164 lb 14.5 oz)   SpO2 100%   BMI 23.66 kg/m²     Labs Reviewed and Include      Recent Labs  Lab 05/25/18  2117 05/26/18  0330   * 160*   CALCIUM 8.8 8.6*   ALBUMIN 3.4*  --    PROT 5.5*  --     144   K 3.7 3.9   CO2 21* 22*   * 111*   BUN 31* 34*   CREATININE 1.6* 1.7*   ALKPHOS 51*  --    ALT 15  --    AST 77*  --    BILITOT 1.5*  --      Lab Results   Component Value Date    WBC 8.57 05/26/2018    HGB 8.9 (L) 05/26/2018    HCT 26.3 (L) 05/26/2018    MCV 92 05/26/2018    PLT 77 (L) 05/26/2018     No results for input(s): TSH, FREET4 in the last 168 hours.  Lab Results   Component Value Date    HGBA1C 5.4 02/22/2018       Nutritional status:   Body mass index is 23.66 kg/m².  Lab Results   Component Value Date    ALBUMIN 3.4 (L) 05/25/2018    ALBUMIN 3.7 05/25/2018    ALBUMIN 3.3 (L) 05/24/2018     Lab Results   Component Value Date    PREALBUMIN 12 (L) 02/22/2018       Estimated Creatinine Clearance: 43.5 mL/min (A) (based on SCr of 1.7 mg/dL (H)).    Accu-Checks  Recent Labs      05/25/18   2323  05/26/18   0006  05/26/18   0113  05/26/18   0210  05/26/18   0319  05/26/18   0358  05/26/18   0508  05/26/18   0612  05/26/18   0710  05/26/18   0800   POCTGLUCOSE  119*  138*  149*  146*  151*  159*  126*  122*  103  98     Pressors:    Autonomic Drugs     Start     Stop Route Frequency Ordered    05/25/18 1600  epinephrine 4 mg in sodium chloride 0.9% 250 mL infusion     Question Answer Comment   Titrate by: (in mcg/kg/min) 0.05    Titrate interval: (in minutes) 5    Titrate to " maintain: (SBP or MAP or Cardiac Index) CARDIAC INDEX    Cardiac index greater than: (in L/min) 2.2    Maximum dose: (in mcg/kg/min) 2        -- IV Continuous 05/25/18 1511 05/25/18 1600  norepinephrine 4 mg in dextrose 5% 250 mL infusion (premix) (titrating)     Question Answer Comment   Titrate by: (in mcg/kg/min) 0.05    Titrate interval: (in minutes) 5    Titrate to maintain: (MAP or SBP) MAP    Greater than: (in mmHg) 60    Maximum dose: (in mcg/kg/min) 3        -- IV Continuous 05/25/18 1511        Hyperglycemia/Diabetes Medications: Antihyperglycemics     Start     Stop Route Frequency Ordered    05/25/18 1600  insulin regular (Humulin R) 100 Units in sodium chloride 0.9% 100 mL infusion      -- IV Continuous 05/25/18 1511

## 2018-05-26 NOTE — PROGRESS NOTES
Dr. Saldaña at bedside. External pacemaker turned off per MD. HR in 60s. Stat 12 lead EKG ordered: unusual P axis, possible ectopic atrial rhythm. Will continue to monitor the patient closely.

## 2018-05-27 LAB
ABO + RH BLD: NORMAL
ALLENS TEST: ABNORMAL
ANION GAP SERPL CALC-SCNC: 10 MMOL/L
ANISOCYTOSIS BLD QL SMEAR: SLIGHT
APTT BLDCRRT: 32.5 SEC
BASOPHILS # BLD AUTO: 0 K/UL
BASOPHILS NFR BLD: 0 %
BLD GP AB SCN CELLS X3 SERPL QL: NORMAL
BLD PROD TYP BPU: NORMAL
BLOOD UNIT EXPIRATION DATE: NORMAL
BLOOD UNIT TYPE CODE: 6200
BLOOD UNIT TYPE: NORMAL
BUN SERPL-MCNC: 47 MG/DL
CALCIUM SERPL-MCNC: 8.8 MG/DL
CHLORIDE SERPL-SCNC: 111 MMOL/L
CO2 SERPL-SCNC: 24 MMOL/L
CODING SYSTEM: NORMAL
CREAT SERPL-MCNC: 2 MG/DL
DELSYS: ABNORMAL
DIFFERENTIAL METHOD: ABNORMAL
DISPENSE STATUS: NORMAL
EOSINOPHIL # BLD AUTO: 0 K/UL
EOSINOPHIL NFR BLD: 0 %
ERYTHROCYTE [DISTWIDTH] IN BLOOD BY AUTOMATED COUNT: 15.7 %
ERYTHROCYTE [SEDIMENTATION RATE] IN BLOOD BY WESTERGREN METHOD: 14 MM/H
EST. GFR  (AFRICAN AMERICAN): 38.8 ML/MIN/1.73 M^2
EST. GFR  (NON AFRICAN AMERICAN): 33.5 ML/MIN/1.73 M^2
FIO2: 50
FLOW: 10
FLOW: 3
FLOW: 4
GLUCOSE SERPL-MCNC: 139 MG/DL
GLUCOSE SERPL-MCNC: 184 MG/DL (ref 70–110)
GLUCOSE SERPL-MCNC: 211 MG/DL (ref 70–110)
HCO3 UR-SCNC: 22.8 MMOL/L (ref 24–28)
HCO3 UR-SCNC: 23.4 MMOL/L (ref 24–28)
HCO3 UR-SCNC: 23.8 MMOL/L (ref 24–28)
HCO3 UR-SCNC: 23.8 MMOL/L (ref 24–28)
HCO3 UR-SCNC: 24.5 MMOL/L (ref 24–28)
HCO3 UR-SCNC: 25.6 MMOL/L (ref 24–28)
HCT VFR BLD AUTO: 24 %
HCT VFR BLD CALC: 18 %PCV (ref 36–54)
HCT VFR BLD CALC: 24 %PCV (ref 36–54)
HGB BLD-MCNC: 7.9 G/DL
HYPOCHROMIA BLD QL SMEAR: ABNORMAL
IMM GRANULOCYTES # BLD AUTO: 0.02 K/UL
IMM GRANULOCYTES NFR BLD AUTO: 0.3 %
INR PPP: 1.3
LDH SERPL L TO P-CCNC: 0.44 MMOL/L (ref 0.36–1.25)
LDH SERPL L TO P-CCNC: 0.45 MMOL/L (ref 0.36–1.25)
LDH SERPL L TO P-CCNC: 0.47 MMOL/L (ref 0.36–1.25)
LYMPHOCYTES # BLD AUTO: 0.3 K/UL
LYMPHOCYTES NFR BLD: 4.4 %
MAGNESIUM SERPL-MCNC: 2.4 MG/DL
MAGNESIUM SERPL-MCNC: 2.5 MG/DL
MAGNESIUM SERPL-MCNC: 2.6 MG/DL
MAGNESIUM SERPL-MCNC: 2.7 MG/DL
MCH RBC QN AUTO: 30.7 PG
MCHC RBC AUTO-ENTMCNC: 32.9 G/DL
MCV RBC AUTO: 93 FL
MODE: ABNORMAL
MONOCYTES # BLD AUTO: 0.3 K/UL
MONOCYTES NFR BLD: 3.7 %
NEUTROPHILS # BLD AUTO: 6.4 K/UL
NEUTROPHILS NFR BLD: 91.6 %
NRBC BLD-RTO: 0 /100 WBC
NUM UNITS TRANS FFP: NORMAL
NUM UNITS TRANS FFP: NORMAL
PCO2 BLDA: 32.1 MMHG (ref 35–45)
PCO2 BLDA: 33.3 MMHG (ref 35–45)
PCO2 BLDA: 34.8 MMHG (ref 35–45)
PCO2 BLDA: 38.2 MMHG (ref 35–45)
PCO2 BLDA: 39 MMHG (ref 35–45)
PCO2 BLDA: 41.5 MMHG (ref 35–45)
PH SMN: 7.35 [PH] (ref 7.35–7.45)
PH SMN: 7.39 [PH] (ref 7.35–7.45)
PH SMN: 7.42 [PH] (ref 7.35–7.45)
PH SMN: 7.46 [PH] (ref 7.35–7.45)
PH SMN: 7.46 [PH] (ref 7.35–7.45)
PH SMN: 7.48 [PH] (ref 7.35–7.45)
PHOSPHATE SERPL-MCNC: 6.1 MG/DL
PLATELET # BLD AUTO: 54 K/UL
PLATELET # BLD AUTO: 55 K/UL
PLATELET BLD QL SMEAR: ABNORMAL
PMV BLD AUTO: 12.1 FL
PMV BLD AUTO: 12.3 FL
PO2 BLDA: 282 MMHG (ref 80–100)
PO2 BLDA: 34 MMHG (ref 40–60)
PO2 BLDA: 503 MMHG (ref 80–100)
PO2 BLDA: 75 MMHG (ref 80–100)
PO2 BLDA: 79 MMHG (ref 80–100)
PO2 BLDA: 88 MMHG (ref 80–100)
POC BE: -2 MMOL/L
POC BE: -3 MMOL/L
POC BE: 0 MMOL/L
POC BE: 0 MMOL/L
POC BE: 1 MMOL/L
POC BE: 1 MMOL/L
POC IONIZED CALCIUM: 1.07 MMOL/L (ref 1.06–1.42)
POC IONIZED CALCIUM: 1.27 MMOL/L (ref 1.06–1.42)
POC SATURATED O2: 100 % (ref 95–100)
POC SATURATED O2: 100 % (ref 95–100)
POC SATURATED O2: 67 % (ref 95–100)
POC SATURATED O2: 96 % (ref 95–100)
POC SATURATED O2: 96 % (ref 95–100)
POC SATURATED O2: 97 % (ref 95–100)
POC TCO2: 24 MMOL/L (ref 23–27)
POC TCO2: 25 MMOL/L (ref 23–27)
POC TCO2: 27 MMOL/L (ref 24–29)
POCT GLUCOSE: 117 MG/DL (ref 70–110)
POCT GLUCOSE: 118 MG/DL (ref 70–110)
POCT GLUCOSE: 123 MG/DL (ref 70–110)
POCT GLUCOSE: 129 MG/DL (ref 70–110)
POCT GLUCOSE: 129 MG/DL (ref 70–110)
POCT GLUCOSE: 134 MG/DL (ref 70–110)
POCT GLUCOSE: 135 MG/DL (ref 70–110)
POCT GLUCOSE: 93 MG/DL (ref 70–110)
POCT GLUCOSE: 99 MG/DL (ref 70–110)
POTASSIUM BLD-SCNC: 3.3 MMOL/L (ref 3.5–5.1)
POTASSIUM BLD-SCNC: 4 MMOL/L (ref 3.5–5.1)
POTASSIUM SERPL-SCNC: 4.1 MMOL/L
POTASSIUM SERPL-SCNC: 4.2 MMOL/L
POTASSIUM SERPL-SCNC: 4.3 MMOL/L
PROTHROMBIN TIME: 12.9 SEC
RBC # BLD AUTO: 2.57 M/UL
SAMPLE: ABNORMAL
SITE: ABNORMAL
SODIUM BLD-SCNC: 142 MMOL/L (ref 136–145)
SODIUM BLD-SCNC: 143 MMOL/L (ref 136–145)
SODIUM SERPL-SCNC: 145 MMOL/L
SP02: 95
TRANS ERYTHROCYTES VOL PATIENT: NORMAL ML
WBC # BLD AUTO: 6.99 K/UL

## 2018-05-27 PROCEDURE — 83735 ASSAY OF MAGNESIUM: CPT | Mod: 91

## 2018-05-27 PROCEDURE — 97535 SELF CARE MNGMENT TRAINING: CPT

## 2018-05-27 PROCEDURE — G8997 SWALLOW GOAL STATUS: HCPCS | Mod: CM

## 2018-05-27 PROCEDURE — 63600175 PHARM REV CODE 636 W HCPCS: Performed by: THORACIC SURGERY (CARDIOTHORACIC VASCULAR SURGERY)

## 2018-05-27 PROCEDURE — 63600175 PHARM REV CODE 636 W HCPCS: Performed by: STUDENT IN AN ORGANIZED HEALTH CARE EDUCATION/TRAINING PROGRAM

## 2018-05-27 PROCEDURE — A4216 STERILE WATER/SALINE, 10 ML: HCPCS | Performed by: THORACIC SURGERY (CARDIOTHORACIC VASCULAR SURGERY)

## 2018-05-27 PROCEDURE — 25000003 PHARM REV CODE 250: Performed by: THORACIC SURGERY (CARDIOTHORACIC VASCULAR SURGERY)

## 2018-05-27 PROCEDURE — 83735 ASSAY OF MAGNESIUM: CPT

## 2018-05-27 PROCEDURE — 20000000 HC ICU ROOM

## 2018-05-27 PROCEDURE — 85049 AUTOMATED PLATELET COUNT: CPT

## 2018-05-27 PROCEDURE — 84100 ASSAY OF PHOSPHORUS: CPT

## 2018-05-27 PROCEDURE — 27000221 HC OXYGEN, UP TO 24 HOURS

## 2018-05-27 PROCEDURE — 99232 SBSQ HOSP IP/OBS MODERATE 35: CPT | Mod: GC,,, | Performed by: ANESTHESIOLOGY

## 2018-05-27 PROCEDURE — G8996 SWALLOW CURRENT STATUS: HCPCS | Mod: CN

## 2018-05-27 PROCEDURE — 82803 BLOOD GASES ANY COMBINATION: CPT

## 2018-05-27 PROCEDURE — 94640 AIRWAY INHALATION TREATMENT: CPT

## 2018-05-27 PROCEDURE — 25000242 PHARM REV CODE 250 ALT 637 W/ HCPCS: Performed by: THORACIC SURGERY (CARDIOTHORACIC VASCULAR SURGERY)

## 2018-05-27 PROCEDURE — 99231 SBSQ HOSP IP/OBS SF/LOW 25: CPT | Mod: ,,, | Performed by: NURSE PRACTITIONER

## 2018-05-27 PROCEDURE — 84132 ASSAY OF SERUM POTASSIUM: CPT | Mod: 91

## 2018-05-27 PROCEDURE — 83605 ASSAY OF LACTIC ACID: CPT

## 2018-05-27 PROCEDURE — 85610 PROTHROMBIN TIME: CPT

## 2018-05-27 PROCEDURE — 94761 N-INVAS EAR/PLS OXIMETRY MLT: CPT

## 2018-05-27 PROCEDURE — 25000003 PHARM REV CODE 250: Performed by: STUDENT IN AN ORGANIZED HEALTH CARE EDUCATION/TRAINING PROGRAM

## 2018-05-27 PROCEDURE — 37799 UNLISTED PX VASCULAR SURGERY: CPT

## 2018-05-27 PROCEDURE — 85025 COMPLETE CBC W/AUTO DIFF WBC: CPT

## 2018-05-27 PROCEDURE — S0028 INJECTION, FAMOTIDINE, 20 MG: HCPCS | Performed by: THORACIC SURGERY (CARDIOTHORACIC VASCULAR SURGERY)

## 2018-05-27 PROCEDURE — 99900035 HC TECH TIME PER 15 MIN (STAT)

## 2018-05-27 PROCEDURE — 80048 BASIC METABOLIC PNL TOTAL CA: CPT

## 2018-05-27 PROCEDURE — 85730 THROMBOPLASTIN TIME PARTIAL: CPT

## 2018-05-27 PROCEDURE — 92610 EVALUATE SWALLOWING FUNCTION: CPT

## 2018-05-27 RX ORDER — GLUCAGON 1 MG
1 KIT INJECTION
Status: DISCONTINUED | OUTPATIENT
Start: 2018-05-27 | End: 2018-05-28

## 2018-05-27 RX ORDER — IPRATROPIUM BROMIDE AND ALBUTEROL SULFATE 2.5; .5 MG/3ML; MG/3ML
3 SOLUTION RESPIRATORY (INHALATION) EVERY 4 HOURS PRN
Status: DISCONTINUED | OUTPATIENT
Start: 2018-05-27 | End: 2018-06-19

## 2018-05-27 RX ORDER — HYDRALAZINE HYDROCHLORIDE 20 MG/ML
20 INJECTION INTRAMUSCULAR; INTRAVENOUS EVERY 8 HOURS PRN
Status: DISCONTINUED | OUTPATIENT
Start: 2018-05-27 | End: 2018-05-28

## 2018-05-27 RX ORDER — INSULIN ASPART 100 [IU]/ML
1-10 INJECTION, SOLUTION INTRAVENOUS; SUBCUTANEOUS EVERY 6 HOURS PRN
Status: DISCONTINUED | OUTPATIENT
Start: 2018-05-27 | End: 2018-05-28

## 2018-05-27 RX ORDER — HYDRALAZINE HYDROCHLORIDE 20 MG/ML
10 INJECTION INTRAMUSCULAR; INTRAVENOUS EVERY 6 HOURS PRN
Status: DISCONTINUED | OUTPATIENT
Start: 2018-05-27 | End: 2018-05-27

## 2018-05-27 RX ORDER — METOPROLOL TARTRATE 25 MG/1
12.5 TABLET ORAL 2 TIMES DAILY
Status: DISCONTINUED | OUTPATIENT
Start: 2018-05-27 | End: 2018-05-28

## 2018-05-27 RX ORDER — WARFARIN 3 MG/1
3 TABLET ORAL DAILY
Status: DISCONTINUED | OUTPATIENT
Start: 2018-05-27 | End: 2018-05-30

## 2018-05-27 RX ADMIN — FAMOTIDINE 20 MG: 10 INJECTION INTRAVENOUS at 08:05

## 2018-05-27 RX ADMIN — Medication 3 ML: at 05:05

## 2018-05-27 RX ADMIN — Medication 3 ML: at 09:05

## 2018-05-27 RX ADMIN — IPRATROPIUM BROMIDE AND ALBUTEROL SULFATE 3 ML: .5; 3 SOLUTION RESPIRATORY (INHALATION) at 03:05

## 2018-05-27 RX ADMIN — MUPIROCIN 1 G: 20 OINTMENT TOPICAL at 08:05

## 2018-05-27 RX ADMIN — OXYCODONE HYDROCHLORIDE 10 MG: 5 TABLET ORAL at 08:05

## 2018-05-27 RX ADMIN — HYDRALAZINE HYDROCHLORIDE 10 MG: 20 INJECTION INTRAMUSCULAR; INTRAVENOUS at 04:05

## 2018-05-27 RX ADMIN — ATORVASTATIN CALCIUM 40 MG: 20 TABLET, FILM COATED ORAL at 08:05

## 2018-05-27 RX ADMIN — CEFAZOLIN 2 G: 1 INJECTION, POWDER, FOR SOLUTION INTRAMUSCULAR; INTRAVENOUS at 05:05

## 2018-05-27 RX ADMIN — Medication 12.5 MG: at 09:05

## 2018-05-27 RX ADMIN — MUPIROCIN 1 G: 20 OINTMENT TOPICAL at 09:05

## 2018-05-27 RX ADMIN — FAMOTIDINE 20 MG: 10 INJECTION INTRAVENOUS at 09:05

## 2018-05-27 RX ADMIN — ASPIRIN 325 MG ORAL TABLET 325 MG: 325 PILL ORAL at 08:05

## 2018-05-27 RX ADMIN — WARFARIN SODIUM 3 MG: 2 TABLET ORAL at 05:05

## 2018-05-27 RX ADMIN — HYDRALAZINE HYDROCHLORIDE 20 MG: 20 INJECTION INTRAMUSCULAR; INTRAVENOUS at 09:05

## 2018-05-27 RX ADMIN — MINERAL OIL AND WHITE PETROLATUM: 150; 830 OINTMENT OPHTHALMIC at 09:05

## 2018-05-27 RX ADMIN — FUROSEMIDE 15 MG/HR: 10 INJECTION, SOLUTION INTRAMUSCULAR; INTRAVENOUS at 02:05

## 2018-05-27 RX ADMIN — OXYCODONE HYDROCHLORIDE 10 MG: 5 TABLET ORAL at 06:05

## 2018-05-27 RX ADMIN — IPRATROPIUM BROMIDE AND ALBUTEROL SULFATE 3 ML: .5; 3 SOLUTION RESPIRATORY (INHALATION) at 12:05

## 2018-05-27 RX ADMIN — Medication 3 ML: at 03:05

## 2018-05-27 NOTE — SUBJECTIVE & OBJECTIVE
Interval History: No acute events overnight. Plan for IABP out today then wean to extubate. Intubated and sedated.    Medications:  Continuous Infusions:   epinephrine 0.02 mcg/kg/min (05/26/18 2300)    furosemide (LASIX) 2 mg/mL infusion (non-titrating) 15 mg/hr (05/26/18 2300)    insulin (HUMAN R) infusion (adults) Stopped (05/26/18 1100)    nicardipine 1 mg/hr (05/26/18 2300)    norepinephrine bitartrate-D5W Stopped (05/25/18 1510)    propofol Stopped (05/26/18 1910)     Scheduled Meds:   albuterol-ipratropium  3 mL Nebulization Q4H    aspirin  325 mg Oral Daily    atorvastatin  40 mg Oral Daily    ceFAZolin (ANCEF) IVPB  2 g Intravenous Q8H    famotidine (PF)  20 mg Intravenous BID    mupirocin  1 g Nasal BID    polyethylene glycol  17 g Oral Daily    sodium bicarbonate  50 mEq Intravenous Once    sodium chloride 0.9%  3 mL Intravenous Q8H    tamsulosin  0.4 mg Oral Daily     PRN Meds:albumin human 5%, dextrose 50%, dextrose 50%, fentaNYL, fentaNYL, lactated ringers, [START ON 5/27/2018] lactulose, magnesium sulfate IVPB, metoclopramide HCl, morphine, ondansetron, oxyCODONE, oxyCODONE, potassium chloride in water **AND** potassium chloride in water **AND** potassium chloride in water, sodium phosphate IVPB, sodium phosphate IVPB, sodium phosphate IVPB     Objective:     Vital Signs (Most Recent):  Temp: 98.7 °F (37.1 °C) (05/26/18 2300)  Pulse: 79 (05/26/18 2300)  Resp: 16 (05/26/18 2300)  BP: 128/60 (05/26/18 2300)  SpO2: 97 % (05/26/18 2300) Vital Signs (24h Range):  Temp:  [97.6 °F (36.4 °C)-99 °F (37.2 °C)] 98.7 °F (37.1 °C)  Pulse:  [60-83] 79  Resp:  [10-26] 16  SpO2:  [96 %-100 %] 97 %  BP: ()/(53-88) 128/60  Arterial Line BP: ()/(46-58) 130/54     Weight: 74.8 kg (164 lb 14.5 oz)  Body mass index is 23.66 kg/m².    SpO2: 97 %  O2 Device (Oxygen Therapy): venti mask    Intake/Output - Last 3 Shifts       05/25 0700 - 05/26 0659 05/26 0700 - 05/27 0659    I.V. (mL/kg) 3004  (40.2) 1161.4 (15.5)    Blood 672     Total Intake(mL/kg) 3676 (49.1) 1161.4 (15.5)    Urine (mL/kg/hr) 1640 (0.9) 1268 (1)    Chest Tube 425 (0.2) 181 (0.1)    Total Output 2065 1449    Net +1611 -287.6                Lines/Drains/Airways     Peripherally Inserted Central Catheter Line                 PICC Double Lumen 03/05/18 1539 right basilic 82 days          Central Venous Catheter Line                 Introducer 05/25/18 1400 right internal jugular 1 day         Percutaneous Central Line Insertion/Assessment - Quad lumen  05/25/18 0800 left subclavian 1 day         Percutaneous Central Line Insertion/Assessment - double lumen  05/25/18 0800 right internal jugular 1 day         Percutaneous Central Line Insertion/Assessment - quad lumen  05/25/18 0815 left subclavian 1 day         Pulmonary Artery Catheter Assessment  05/25/18 0745 1 day          Drain                 Chest Tube 05/25/18 1515 1 Right Mediastinal 1 day         Chest Tube 05/25/18 1515 2 Left Mediastinal 1 day         NG/OG Tube 05/25/18 1549 Right nostril 1 day         Urethral Catheter 05/25/18 0830 Temperature probe;Straight-tip;Latex 14 Fr. 1 day         Y Chest Tube 3 and 4 05/25/18 1354 3 Right Pleural 32 Fr. 4 Left Pericardial 32 Fr. 1 day          Arterial Line                 Arterial Line 05/25/18 0745 Left Brachial 1 day          Line                 Pacer Wires 05/25/18 1355 1 day          Peripheral Intravenous Line                 Peripheral IV - Single Lumen 05/23/18 1200 Left Forearm 3 days         Peripheral IV - Single Lumen 05/23/18 1700 Left Wrist 3 days                Physical Exam   Constitutional: He appears well-developed.   Cardiovascular: Regular rhythm.    Paced at 80   Pulmonary/Chest:   Intubated   Skin: Skin is warm and dry.       Significant Labs:  ABGs:   Recent Labs  Lab 05/26/18  2019   PH 7.520*   PCO2 29.4*   PO2 117*   HCO3 24.0   POCSATURATED 99   BE 1     CBC:   Recent Labs  Lab 05/26/18  0330   WBC 8.57    RBC 2.86*   HGB 8.9*   HCT 26.3*   PLT 77*   MCV 92   MCH 31.1*   MCHC 33.8     CMP:   Recent Labs  Lab 05/25/18 2117 05/26/18  1915   *  < > 150*   CALCIUM 8.8  < > 8.3*   ALBUMIN 3.4*  --   --    PROT 5.5*  --   --      < > 144   K 3.7  < > 4.2   CO2 21*  < > 21*   *  < > 111*   BUN 31*  < > 41*   CREATININE 1.6*  < > 1.9*   ALKPHOS 51*  --   --    ALT 15  --   --    AST 77*  --   --    BILITOT 1.5*  --   --    < > = values in this interval not displayed.  Coagulation:   Recent Labs  Lab 05/26/18  0330   INR 1.1   APTT 28.5

## 2018-05-27 NOTE — PROGRESS NOTES
"[facility]  Endocrinology  Progress Note    Admit Date: 5/23/2018     Reason for Consult: Management of  Hyperglycemia     Surgical Procedure and Date: s/p CABG x 1, MVR  5/25/18    HPI: Patient is a 67 y.o. male with a diagnosis of mitral valve endocarditis and resultant severe MR, TR and pulmonary HTN. The etiology of his endocarditis remains unknown. He was admitted to the SICU pre-operatively for IABP placement 5/23/18. Underwent cardiac surgery 5/25/18.   No personal or family hx of DM  Lab Results   Component Value Date    HGBA1C 5.4 02/22/2018     Endocrine consulted for BG mgmt.             Interval HPI:   Overnight events: remains in ICU, IABP. Extubated yesterday evening. BG remains at goal without insulin.   Eating:   NPO; speech following to advance diet   Nausea: No  Hypoglycemia and intervention: No  Fever: No  TPN and/or TF: No    BP (!) 118/58   Pulse 79   Temp 98 °F (36.7 °C) (Oral)   Resp 17   Ht 5' 10" (1.778 m)   Wt 74.3 kg (163 lb 12.8 oz)   SpO2 96%   BMI 23.50 kg/m²       Labs Reviewed and Include      Recent Labs  Lab 05/27/18  0155  05/27/18  1132   *  --   --    CALCIUM 8.8  --   --      --   --    K 4.3  < > 4.1   CO2 24  --   --    *  --   --    BUN 47*  --   --    CREATININE 2.0*  --   --    < > = values in this interval not displayed.  Lab Results   Component Value Date    WBC 6.99 05/27/2018    HGB 7.9 (L) 05/27/2018    HCT 24.0 (L) 05/27/2018    MCV 93 05/27/2018    PLT 55 (L) 05/27/2018     No results for input(s): TSH, FREET4 in the last 168 hours.  Lab Results   Component Value Date    HGBA1C 5.4 02/22/2018       Nutritional status:   Body mass index is 23.66 kg/m².  Lab Results   Component Value Date    ALBUMIN 3.4 (L) 05/25/2018    ALBUMIN 3.7 05/25/2018    ALBUMIN 3.3 (L) 05/24/2018     Lab Results   Component Value Date    PREALBUMIN 12 (L) 02/22/2018       Estimated Creatinine Clearance: 43.5 mL/min (A) (based on SCr of 1.7 mg/dL " (H)).    Accu-Checks  Recent Labs      05/26/18   1807  05/26/18   1917  05/26/18   2014  05/26/18   2303  05/27/18   0309  05/27/18   0438  05/27/18   0557  05/27/18   0712  05/27/18   0821  05/27/18   1131   POCTGLUCOSE  132*  140*  139*  138*  135*  129*  134*  123*  129*  118*       Current Medications and/or Treatments Impacting Glycemic Control  Immunotherapy:    Immunosuppressants     None        Steroids:   Hormones     None        Pressors:    Autonomic Drugs     Start     Stop Route Frequency Ordered    05/27/18 2100  metoprolol tartrate (LOPRESSOR) split tablet 12.5 mg      -- Oral 2 times daily 05/27/18 1235    05/25/18 1600  norepinephrine 4 mg in dextrose 5% 250 mL infusion (premix) (titrating)     Question Answer Comment   Titrate by: (in mcg/kg/min) 0.05    Titrate interval: (in minutes) 5    Titrate to maintain: (MAP or SBP) MAP    Greater than: (in mmHg) 60    Maximum dose: (in mcg/kg/min) 3        -- IV Continuous 05/25/18 1511        Hyperglycemia/Diabetes Medications:   Antihyperglycemics     Start     Stop Route Frequency Ordered    05/27/18 1054  insulin aspart U-100 pen 1-10 Units      -- SubQ Every 6 hours PRN 05/27/18 0954          ASSESSMENT and PLAN    * S/P MVR (mitral valve replacement)    Per CTS  avoid hypoglycemia          Acute hyperglycemia    BG goal 110-140     Continue bg monitoring to q4 hours  and moderate dose correction scale coverage.     ADDENDUM: change bg monitoring to q6 hour while NPO; will change to ac/hs when diet advanced      Please page endocrine NP on call with any BG related issues or concerns. Thanks.       Discharge plans-TBD             Coronary artery disease of native artery of native heart with stable angina pectoris    Per CTS  avoid hypoglycemia          S/P CABG x 1    Per CTS  avoid hypoglycemia          Chronic systolic congestive heart failure    Per CTS  avoid hypoglycemia                Lynn Canseco NP  Endocrinology  [facility]  [place]

## 2018-05-27 NOTE — PROGRESS NOTES
Dr. Varner, Dr. Saldaña and Dr. Hawkins at the bedside. Epi turned off and order to remove all the central lines per Dr. Varner. Will get extra IV access and carry out the orders.

## 2018-05-27 NOTE — PROGRESS NOTES
Dr. Hawkins at bedside. MD aware of gtts, VS, UOP, and overall pt status. MD gave order to decrease Epi gtt to 0.01mcg/kg/min, d/c swizzy, d/c NGT, get formal swallow study from SLP and get pt OOB to chair. All orders discussed with pt and spouse. Will carry out and continue to monitor closely.

## 2018-05-27 NOTE — SUBJECTIVE & OBJECTIVE
Interval History/Significant Events:  No acute events overnight, afebrile, vital signs stable. Extubated yesterday evening, saturating well on nasal canula. IABP remains in place. Epi, cardene and lasix gtt. Chest tubes with appropriate sanguineous output.    Follow-up For: Procedure(s) (LRB):  Mitral Valve Replacement (N/A)  AORTOCORONARY BYPASS-CABG (N/A)    Post-Operative Day: 2 Days Post-Op    Objective:     Vital Signs (Most Recent):  Temp: 98.6 °F (37 °C) (05/27/18 0600)  Pulse: 77 (05/27/18 0600)  Resp: 15 (05/27/18 0600)  BP: 129/61 (05/27/18 0600)  SpO2: 95 % (05/27/18 0600) Vital Signs (24h Range):  Temp:  [97.6 °F (36.4 °C)-99 °F (37.2 °C)] 98.6 °F (37 °C)  Pulse:  [60-83] 77  Resp:  [8-26] 15  SpO2:  [93 %-100 %] 95 %  BP: ()/(53-88) 129/61  Arterial Line BP: ()/(47-58) 134/50     Weight: 74.3 kg (163 lb 12.8 oz)  Body mass index is 23.5 kg/m².      Intake/Output Summary (Last 24 hours) at 05/27/18 0610  Last data filed at 05/27/18 0600   Gross per 24 hour   Intake          1354.43 ml   Output             2548 ml   Net         -1193.57 ml       Physical Exam   Constitutional: He appears well-developed.   Cardiovascular: Regular rhythm.    Paced at 80   Pulmonary/Chest:   Extubated, on nasal canula    Skin: Skin is warm and dry.       Vents:  Vent Mode: Spont (05/26/18 2025)  Ventilator Initiated: Yes (05/25/18 1509)  Set Rate: 0 bmp (05/26/18 2025)  Vt Set: 500 mL (05/26/18 2025)  Pressure Support: 10 cmH20 (05/26/18 2025)  PEEP/CPAP: 5 cmH20 (05/26/18 2025)  Oxygen Concentration (%): 4 (05/27/18 0600)  Peak Airway Pressure: 16 cmH2O (05/26/18 2025)  Plateau Pressure: 0 cmH20 (05/26/18 2025)  Total Ve: 11.8 mL (05/26/18 2025)  F/VT Ratio<105 (RSBI): (!) 32.76 (05/26/18 2025)    Lines/Drains/Airways     Peripherally Inserted Central Catheter Line                 PICC Double Lumen 03/05/18 1539 right basilic 82 days          Central Venous Catheter Line                 Introducer 05/25/18 1400  right internal jugular 1 day         Percutaneous Central Line Insertion/Assessment - Quad lumen  05/25/18 0800 left subclavian 1 day         Percutaneous Central Line Insertion/Assessment - double lumen  05/25/18 0800 right internal jugular 1 day         Percutaneous Central Line Insertion/Assessment - quad lumen  05/25/18 0815 left subclavian 1 day         Pulmonary Artery Catheter Assessment  05/25/18 0745 1 day          Drain                 Chest Tube 05/25/18 1515 1 Right Mediastinal 1 day         Chest Tube 05/25/18 1515 2 Left Mediastinal 1 day         NG/OG Tube 05/25/18 1549 Right nostril 1 day         Urethral Catheter 05/25/18 0830 Temperature probe;Straight-tip;Latex 14 Fr. 1 day         Y Chest Tube 3 and 4 05/25/18 1354 3 Right Pleural 32 Fr. 4 Left Pericardial 32 Fr. 1 day          Arterial Line                 Arterial Line 05/25/18 0745 Left Brachial 1 day          Line                 Pacer Wires 05/25/18 1355 1 day          Peripheral Intravenous Line                 Peripheral IV - Single Lumen 05/23/18 1200 Left Forearm 3 days         Peripheral IV - Single Lumen 05/23/18 1700 Left Wrist 3 days                Significant Labs:    CBC/Anemia Profile:    Recent Labs  Lab 05/25/18 2117 05/26/18  0330 05/27/18  0155   WBC 8.83 8.57 6.99   HGB 9.5* 8.9* 7.9*   HCT 28.5* 26.3* 24.0*   PLT 82* 77* 55*   MCV 93 92 93   RDW 14.6* 14.9* 15.7*        Chemistries:    Recent Labs  Lab 05/25/18  1510 05/25/18 2117 05/26/18  0330 05/26/18  0615 05/26/18  1915 05/26/18  2330 05/27/18  0155    145 144  --  144  --  145   K 3.6 3.7 3.9 4.3 4.2 4.1 4.3   * 112* 111*  --  111*  --  111*   CO2 20* 21* 22*  --  21*  --  24   BUN 29* 31* 34*  --  41*  --  47*   CREATININE 1.6* 1.6* 1.7*  --  1.9*  --  2.0*   CALCIUM 8.8 8.8 8.6*  --  8.3*  --  8.8   ALBUMIN  --  3.4*  --   --   --   --   --    PROT  --  5.5*  --   --   --   --   --    BILITOT  --  1.5*  --   --   --   --   --    ALKPHOS  --  51*  --    --   --   --   --    ALT  --  15  --   --   --   --   --    AST  --  77*  --   --   --   --   --    MG 2.9* 2.8*  --  2.5  --  2.5 2.5   PHOS 3.4 2.5*  --   --   --   --  6.1*       Coagulation:   Recent Labs  Lab 05/27/18  0155   INR 1.3*   APTT 32.5*     Significant Imaging:  I have reviewed all pertinent imaging results/findings within the past 24 hours.

## 2018-05-27 NOTE — PROGRESS NOTES
[facility]  Cardiothoracic Surgery  Progress Note    Patient Name: Rodolfo Messina  MRN: [id number]  Admission Date: 5/23/2018  Hospital Length of Stay: 4 days  Code Status: Full Code   Attending Physician: Marvin Go MD   Referring Provider: Marvin Go MD  Principal Problem:S/P MVR (mitral valve replacement)    Subjective:     Post-Op Info:  Procedure(s) (LRB):  Mitral Valve Replacement (N/A)  AORTOCORONARY BYPASS-CABG (N/A)   2 Days Post-Op     Interval History: No acute events overnight. Doing well, OOBTC.    Medications:  Continuous Infusions:   furosemide (LASIX) 2 mg/mL infusion (non-titrating) 15 mg/hr (05/27/18 1700)    nicardipine Stopped (05/27/18 1200)    norepinephrine bitartrate-D5W Stopped (05/25/18 1510)    propofol Stopped (05/26/18 1910)     Scheduled Meds:   aspirin  325 mg Oral Daily    atorvastatin  40 mg Oral Daily    famotidine (PF)  20 mg Intravenous BID    metoprolol tartrate  12.5 mg Oral BID    mupirocin  1 g Nasal BID    polyethylene glycol  17 g Oral Daily    sodium bicarbonate  50 mEq Intravenous Once    sodium chloride 0.9%  3 mL Intravenous Q8H    tamsulosin  0.4 mg Oral Daily    warfarin  3 mg Oral Daily     PRN Meds:albumin human 5%, albuterol-ipratropium, dextrose 50%, fentaNYL, fentaNYL, glucagon (human recombinant), hydrALAZINE, insulin aspart U-100, lactated ringers, lactulose, magnesium sulfate IVPB, metoclopramide HCl, morphine, ondansetron, oxyCODONE, oxyCODONE, potassium chloride in water **AND** potassium chloride in water **AND** potassium chloride in water, sodium phosphate IVPB, sodium phosphate IVPB, sodium phosphate IVPB     Objective:     Vital Signs (Most Recent):  Temp: 98.2 °F (36.8 °C) (05/27/18 1515)  Pulse: 79 (05/27/18 1700)  Resp: 20 (05/27/18 1700)  BP: (!) 140/66 (05/27/18 1700)  SpO2: 97 % (05/27/18 1700) Vital Signs (24h Range):  Temp:  [97.5 °F (36.4 °C)-99 °F (37.2 °C)] 98.2 °F (36.8 °C)  Pulse:  [70-90] 79  Resp:   [8-28] 20  SpO2:  [86 %-100 %] 97 %  BP: (117-140)/(54-70) 140/66  Arterial Line BP: (110-157)/(38-61) 132/52     Weight: 74.3 kg (163 lb 12.8 oz)  Body mass index is 23.5 kg/m².    SpO2: 97 %  O2 Device (Oxygen Therapy): nasal cannula w/ humidification    Intake/Output - Last 3 Shifts       05/25 0700 - 05/26 0659 05/26 0700 - 05/27 0659 05/27 0700 - 05/28 0659    I.V. (mL/kg) 3004 (40.2) 1354.4 (18.2)     Blood 672      Total Intake(mL/kg) 3676 (49.1) 1354.4 (18.2)     Urine (mL/kg/hr) 1640 (0.9) 2323 (1.3) 2265 (2.8)    Chest Tube 425 (0.2) 225 (0.1) 170 (0.2)    Total Output 2065 2548 2435    Net +1611 -1193.6 -2435                 Lines/Drains/Airways     Peripherally Inserted Central Catheter Line                 PICC Double Lumen 03/05/18 1539 right basilic 83 days          Central Venous Catheter Line                 Introducer 05/25/18 1400 right internal jugular 2 days         Percutaneous Central Line Insertion/Assessment - Quad lumen  05/25/18 0800 left subclavian 2 days          Drain                 Chest Tube 05/25/18 1515 Other (Comment) Pleural 2 days         Urethral Catheter 05/25/18 0830 Temperature probe;Straight-tip;Latex 14 Fr. 2 days         Y Chest Tube 3 and 4 05/25/18 1354 3 Right Pleural 32 Fr. 4 Left Pericardial 32 Fr. 2 days          Arterial Line                 Arterial Line 05/25/18 0745 Left Brachial 2 days          Line                 Pacer Wires 05/25/18 1355 2 days          Peripheral Intravenous Line                 Peripheral IV - Single Lumen 05/23/18 1200 Left Forearm 4 days         Peripheral IV - Single Lumen 05/23/18 1700 Left Wrist 4 days         Peripheral IV - Single Lumen 05/27/18 1135 Right Forearm less than 1 day                Physical Exam   Constitutional: He appears well-developed.   Cardiovascular: Normal rate and regular rhythm.    Pulmonary/Chest: Effort normal.   Neurological: He is alert.   Skin: Skin is warm and dry.       Significant Labs:  CBC:   Recent  Labs  Lab 05/27/18  0155 05/27/18  1534   WBC 6.99  --    RBC 2.57*  --    HGB 7.9*  --    HCT 24.0*  --    PLT 55* 54*   MCV 93  --    MCH 30.7  --    MCHC 32.9  --      CMP:   Recent Labs  Lab 05/25/18  2117  05/27/18  0155  05/27/18  1734   *  < > 139*  --   --    CALCIUM 8.8  < > 8.8  --   --    ALBUMIN 3.4*  --   --   --   --    PROT 5.5*  --   --   --   --      < > 145  --   --    K 3.7  < > 4.3  < > 4.1   CO2 21*  < > 24  --   --    *  < > 111*  --   --    BUN 31*  < > 47*  --   --    CREATININE 1.6*  < > 2.0*  --   --    ALKPHOS 51*  --   --   --   --    ALT 15  --   --   --   --    AST 77*  --   --   --   --    BILITOT 1.5*  --   --   --   --    < > = values in this interval not displayed.      Assessment/Plan:     Chronic systolic congestive heart failure    - Advance diet  - Off epi; wean cardene as tolerated for MAP <80  - Daily labs  - Pain meds PRN  - ASA, statin  - Mediastinal CTs out  - Continue ICU care            Lupe Hawkins MD  Cardiothoracic Surgery  [facility]

## 2018-05-27 NOTE — ASSESSMENT & PLAN NOTE
68yo M with PMH of mitral valve endocarditis and resultant severe MR, TR and pulmonary HTN who is now s/p IABP placement (5/24) and s/pMVR, TV repair, AVR and CABG 5/25.     Plan:     Neuro:   -Not requiring sedation   -Pain control per CTS     Pulmonary:   -Extubated, saturating well on nasal canula, wean as tolerated  -ABGs prn  ABG    Recent Labs  Lab 05/27/18  0444   PH 7.425   PO2 34*   PCO2 39.0   HCO3 25.6   BE 1     -Continuous pulse ox     Cardiac:  -MAP goal >65  -Epi and cardene gtts per CTS  -Continuous cardiac monitoring     Renal:   -UOP adequate  -Flomax daily given home use  -Bun/Cr 37/2.0 (baseline Cr 1.4-1.6), increased from baseline, continue to monitor  -Lasix gtt per CTS     Fluids/Electrolytes/Nutrition/GI:   -Nutritional status: NPO except meds  -replace lytes PRN  -Bowel regimen     Hematology/Oncology:  -H/H 7.9/24.0, continue to monitor  -INR/Plts 1.3/55  -CBC daily     Infectious Disease:   -Afebrile  -WBC 6.99  -CBC daily   -Abx: ancef x 5 doses, completed     Endocrine:  - No h/o DM or thyroid dysfunction  -Not currently requiring insulin gtt for glucose control  - Glucose goal of 120-180     Dispo:  -Step down per CTS  -[facility] primary

## 2018-05-27 NOTE — PT/OT/SLP EVAL
"Speech Language Pathology Evaluation  Bedside Swallow    Patient Name:  Rodolfo Messina   MRN:  [id number]  Admitting Diagnosis: S/P MVR (mitral valve replacement)    Recommendations:                 General Recommendations:  Dysphagia therapy and ongoing swallowing assessment  Diet recommendations:  NPO, NPO   Aspiration Precautions: Continue alternate means of nutrition and Strict aspiration precautions   General Precautions: Standard, aspiration, NPO  Communication strategies:  none    History:     Past Medical History:   Diagnosis Date    ANNA (acute kidney injury) 2/22/2018    Anemia     Anxiety     Arthritis     BPH (benign prostatic hyperplasia)     CHF (congestive heart failure) 5/23/2018    Coronary artery disease of native artery of native heart with stable angina pectoris 5/21/2018    Diverticulosis     Encounter for blood transfusion     Hypertension     Urinary retention        Past Surgical History:   Procedure Laterality Date    APPENDECTOMY      COLONOSCOPY      COLONOSCOPY N/A 2/1/2018    Procedure: COLONOSCOPY;  Surgeon: Richar Payan MD;  Location: [address]);  Service: Endoscopy;  Laterality: N/A;  PM prep    EYE SURGERY Right     cataract extraction    FINGER SURGERY      FRACTURE SURGERY Right     index finger    TONSILLECTOMY         Prior Intubation HX:  Pt extubated on 5/26 at 2030. Nurse reported pt was intubated for approx 48 hours.    Modified Barium Swallow: 3/27/18 during previous admission.  Results revealed flash penetration with thin liquids and mild-mod pharyngeal stasis requiring multiple swallows to clear. Diet recommendations were for regular consistencies and thin liquids at that time.    Prior diet: currently NPO; regular/thins PTA.      Subjective     "I think it's that pipe." pt attributing globus sensation to presence of NG tube.     Pain/Comfort:  · Pain Rating 1: 0/10    Objective:     Oral Musculature Evaluation  · Oral Musculature: " WFL  · Dentition: present and adequate  · Secretion Management: adequate  · Mucosal Quality: dry  · Mandibular Strength and Mobility: WFL  · Oral Labial Strength and Mobility: WFL  · Lingual Strength and Mobility: WFL  · Velar Elevation: WFL  · Buccal Strength and Mobility: WFL  · Volitional Cough: strong  · Volitional Swallow: elicited  · Voice Prior to PO Intake: dry, clear, slightly reduced intensity and volume; pt's wife reports baseline tremors which affect voice    Bedside Swallow Eval:   Consistencies Assessed:  · Thin liquids ice x 2, 1/2 tsp x 1, full tsp x 1  · Puree 1/2 tsp x 1, full tsp x 2     Oral Phase:   · WNL    Pharyngeal Phase:   · globus sensation  · multiple spontaneous swallows  · throat clearing consistently after ice chips and thin liquid trials  · Delayed cough after puree trials    Compensatory Strategies  · Multiple swallows    Treatment: Education provided to pt regarding role of SLP, purpose of swallowing assessment, aspiration, overt s/s of aspiration, complications a/w aspiration, dysphagia a/w recent intubation and general debility, vocal cord function as it relates to airway protection during PO intake, recommendations to remain NPO at this time due to concerns for aspiration, and plan to re-evaluate swallowing function during next session.  Pt and wife expressed understanding.  White board up to date.  Nurse notified of recommendations and plan.     Assessment:     Rodolfo Messina is a 67 y.o. male with an SLP diagnosis of Dysphagia (s/p intubation).    Goals:    SLP Goals        Problem: SLP Goal    Goal Priority Disciplines Outcome   SLP Goal     SLP    Description:  Speech Language Pathology Goals  Goals expected to be met by 6/4:  1. Pt will participate in ongoing swallowing assessment to determine safest and least restrictive diet.                         Plan:     · Patient to be seen:  5 x/week   · Plan of Care expires:  06/25/18  · Plan of Care reviewed with:  patient,  spouse   · SLP Follow-Up:  Yes       Discharge recommendations:   (tbd)     Time Tracking:     SLP Treatment Date:   05/27/18  Speech Start Time:  0825  Speech Stop Time:  0849     Speech Total Time (min):  24 min    Billable Minutes: Eval Swallow and Oral Function 9 and Seld Care/Home Management Training 15    IRVIN Patel, JESSICA-SLP  05/27/2018       IRVIN Patel, CCC-SLP  Speech Language Pathologist  [phone]  5/27/2018

## 2018-05-27 NOTE — PROGRESS NOTES
Dr. Saldaña at bedside. MD gave order to turn off Epi gtt. Order carried out. Will continue to monitor closely.

## 2018-05-27 NOTE — PROGRESS NOTES
[facility]  Critical Care - Surgery  Progress Note    Patient Name: Rodolfo Messina  MRN: [id number]  Admission Date: 5/23/2018  Hospital Length of Stay: 4 days  Code Status: Full Code  Attending Provider: Marvin Go MD  Primary Care Provider: Deric Claudio MD   Principal Problem: S/P MVR (mitral valve replacement)    Subjective:     Hospital/ICU Course:  No notes on file    Interval History/Significant Events:  No acute events overnight, afebrile, vital signs stable. Extubated yesterday evening, saturating well on nasal canula. IABP remains in place. Epi, cardene and lasix gtt. Chest tubes with appropriate sanguineous output.    Follow-up For: Procedure(s) (LRB):  Mitral Valve Replacement (N/A)  AORTOCORONARY BYPASS-CABG (N/A)    Post-Operative Day: 2 Days Post-Op    Objective:     Vital Signs (Most Recent):  Temp: 98.6 °F (37 °C) (05/27/18 0600)  Pulse: 77 (05/27/18 0600)  Resp: 15 (05/27/18 0600)  BP: 129/61 (05/27/18 0600)  SpO2: 95 % (05/27/18 0600) Vital Signs (24h Range):  Temp:  [97.6 °F (36.4 °C)-99 °F (37.2 °C)] 98.6 °F (37 °C)  Pulse:  [60-83] 77  Resp:  [8-26] 15  SpO2:  [93 %-100 %] 95 %  BP: ()/(53-88) 129/61  Arterial Line BP: ()/(47-58) 134/50     Weight: 74.3 kg (163 lb 12.8 oz)  Body mass index is 23.5 kg/m².      Intake/Output Summary (Last 24 hours) at 05/27/18 0610  Last data filed at 05/27/18 0600   Gross per 24 hour   Intake          1354.43 ml   Output             2548 ml   Net         -1193.57 ml       Physical Exam   Constitutional: He appears well-developed.   Cardiovascular: Regular rhythm.    Paced at 80   Pulmonary/Chest:   Extubated, on nasal canula    Skin: Skin is warm and dry.       Vents:  Vent Mode: Spont (05/26/18 2025)  Ventilator Initiated: Yes (05/25/18 1509)  Set Rate: 0 bmp (05/26/18 2025)  Vt Set: 500 mL (05/26/18 2025)  Pressure Support: 10 cmH20 (05/26/18 2025)  PEEP/CPAP: 5 cmH20 (05/26/18 2025)  Oxygen Concentration (%): 4  (05/27/18 0600)  Peak Airway Pressure: 16 cmH2O (05/26/18 2025)  Plateau Pressure: 0 cmH20 (05/26/18 2025)  Total Ve: 11.8 mL (05/26/18 2025)  F/VT Ratio<105 (RSBI): (!) 32.76 (05/26/18 2025)    Lines/Drains/Airways     Peripherally Inserted Central Catheter Line                 PICC Double Lumen 03/05/18 1539 right basilic 82 days          Central Venous Catheter Line                 Introducer 05/25/18 1400 right internal jugular 1 day         Percutaneous Central Line Insertion/Assessment - Quad lumen  05/25/18 0800 left subclavian 1 day         Percutaneous Central Line Insertion/Assessment - double lumen  05/25/18 0800 right internal jugular 1 day         Percutaneous Central Line Insertion/Assessment - quad lumen  05/25/18 0815 left subclavian 1 day         Pulmonary Artery Catheter Assessment  05/25/18 0745 1 day          Drain                 Chest Tube 05/25/18 1515 1 Right Mediastinal 1 day         Chest Tube 05/25/18 1515 2 Left Mediastinal 1 day         NG/OG Tube 05/25/18 1549 Right nostril 1 day         Urethral Catheter 05/25/18 0830 Temperature probe;Straight-tip;Latex 14 Fr. 1 day         Y Chest Tube 3 and 4 05/25/18 1354 3 Right Pleural 32 Fr. 4 Left Pericardial 32 Fr. 1 day          Arterial Line                 Arterial Line 05/25/18 0745 Left Brachial 1 day          Line                 Pacer Wires 05/25/18 1355 1 day          Peripheral Intravenous Line                 Peripheral IV - Single Lumen 05/23/18 1200 Left Forearm 3 days         Peripheral IV - Single Lumen 05/23/18 1700 Left Wrist 3 days                Significant Labs:    CBC/Anemia Profile:    Recent Labs  Lab 05/25/18 2117 05/26/18  0330 05/27/18  0155   WBC 8.83 8.57 6.99   HGB 9.5* 8.9* 7.9*   HCT 28.5* 26.3* 24.0*   PLT 82* 77* 55*   MCV 93 92 93   RDW 14.6* 14.9* 15.7*        Chemistries:    Recent Labs  Lab 05/25/18  1510 05/25/18  2117 05/26/18  0330 05/26/18  0615 05/26/18  1915 05/26/18  2330 05/27/18  0155    145  144  --  144  --  145   K 3.6 3.7 3.9 4.3 4.2 4.1 4.3   * 112* 111*  --  111*  --  111*   CO2 20* 21* 22*  --  21*  --  24   BUN 29* 31* 34*  --  41*  --  47*   CREATININE 1.6* 1.6* 1.7*  --  1.9*  --  2.0*   CALCIUM 8.8 8.8 8.6*  --  8.3*  --  8.8   ALBUMIN  --  3.4*  --   --   --   --   --    PROT  --  5.5*  --   --   --   --   --    BILITOT  --  1.5*  --   --   --   --   --    ALKPHOS  --  51*  --   --   --   --   --    ALT  --  15  --   --   --   --   --    AST  --  77*  --   --   --   --   --    MG 2.9* 2.8*  --  2.5  --  2.5 2.5   PHOS 3.4 2.5*  --   --   --   --  6.1*       Coagulation:   Recent Labs  Lab 05/27/18  0155   INR 1.3*   APTT 32.5*     Significant Imaging:  I have reviewed all pertinent imaging results/findings within the past 24 hours.    Assessment/Plan:     Chronic systolic congestive heart failure     66yo M with PMH of mitral valve endocarditis and resultant severe MR, TR and pulmonary HTN who is now s/p IABP placement (5/24) and s/pMVR, TV repair, AVR and CABG 5/25.     Plan:     Neuro:   -Not requiring sedation   -Pain control per CTS     Pulmonary:   -Extubated, saturating well on nasal canula, wean as tolerated  -ABGs prn  ABG    Recent Labs  Lab 05/27/18  0444   PH 7.425   PO2 34*   PCO2 39.0   HCO3 25.6   BE 1     -Continuous pulse ox     Cardiac:  -MAP goal >65  -Epi and cardene gtts per CTS  -Continuous cardiac monitoring     Renal:   -UOP adequate  -Flomax daily given home use  -Bun/Cr 37/2.0 (baseline Cr 1.4-1.6), increased from baseline, continue to monitor  -Lasix gtt per CTS     Fluids/Electrolytes/Nutrition/GI:   -Nutritional status: NPO except meds  -replace lytes PRN  -Bowel regimen     Hematology/Oncology:  -H/H 7.9/24.0, continue to monitor  -INR/Plts 1.3/55  -CBC daily     Infectious Disease:   -Afebrile  -WBC 6.99  -CBC daily   -Abx: ancef x 5 doses, completed     Endocrine:  - No h/o DM or thyroid dysfunction  -Not currently requiring insulin gtt for glucose  control  - Glucose goal of 120-180     Dispo:  -Continue care in the ICU setting  -[facility] primary                       Critical care was time spent personally by me on the following activities: development of treatment plan with patient or surrogate and bedside caregivers, discussions with consultants, evaluation of patient's response to treatment, examination of patient, ordering and performing treatments and interventions, ordering and review of laboratory studies, ordering and review of radiographic studies, pulse oximetry, re-evaluation of patient's condition.  This critical care time did not overlap with that of any other provider or involve time for any procedures.     Pepper Chadwick MD  Critical Care - Surgery  [facility]

## 2018-05-27 NOTE — SUBJECTIVE & OBJECTIVE
Interval History: No acute events overnight. Doing well, OOBTC.    Medications:  Continuous Infusions:   furosemide (LASIX) 2 mg/mL infusion (non-titrating) 15 mg/hr (05/27/18 1700)    nicardipine Stopped (05/27/18 1200)    norepinephrine bitartrate-D5W Stopped (05/25/18 1510)    propofol Stopped (05/26/18 1910)     Scheduled Meds:   aspirin  325 mg Oral Daily    atorvastatin  40 mg Oral Daily    famotidine (PF)  20 mg Intravenous BID    metoprolol tartrate  12.5 mg Oral BID    mupirocin  1 g Nasal BID    polyethylene glycol  17 g Oral Daily    sodium bicarbonate  50 mEq Intravenous Once    sodium chloride 0.9%  3 mL Intravenous Q8H    tamsulosin  0.4 mg Oral Daily    warfarin  3 mg Oral Daily     PRN Meds:albumin human 5%, albuterol-ipratropium, dextrose 50%, fentaNYL, fentaNYL, glucagon (human recombinant), hydrALAZINE, insulin aspart U-100, lactated ringers, lactulose, magnesium sulfate IVPB, metoclopramide HCl, morphine, ondansetron, oxyCODONE, oxyCODONE, potassium chloride in water **AND** potassium chloride in water **AND** potassium chloride in water, sodium phosphate IVPB, sodium phosphate IVPB, sodium phosphate IVPB     Objective:     Vital Signs (Most Recent):  Temp: 98.2 °F (36.8 °C) (05/27/18 1515)  Pulse: 79 (05/27/18 1700)  Resp: 20 (05/27/18 1700)  BP: (!) 140/66 (05/27/18 1700)  SpO2: 97 % (05/27/18 1700) Vital Signs (24h Range):  Temp:  [97.5 °F (36.4 °C)-99 °F (37.2 °C)] 98.2 °F (36.8 °C)  Pulse:  [70-90] 79  Resp:  [8-28] 20  SpO2:  [86 %-100 %] 97 %  BP: (117-140)/(54-70) 140/66  Arterial Line BP: (110-157)/(38-61) 132/52     Weight: 74.3 kg (163 lb 12.8 oz)  Body mass index is 23.5 kg/m².    SpO2: 97 %  O2 Device (Oxygen Therapy): nasal cannula w/ humidification    Intake/Output - Last 3 Shifts       05/25 0700 - 05/26 0659 05/26 0700 - 05/27 0659 05/27 0700 - 05/28 0659    I.V. (mL/kg) 3004 (40.2) 1354.4 (18.2)     Blood 672      Total Intake(mL/kg) 3676 (49.1) 1354.4 (18.2)      Urine (mL/kg/hr) 1640 (0.9) 2323 (1.3) 2265 (2.8)    Chest Tube 425 (0.2) 225 (0.1) 170 (0.2)    Total Output 2065 2548 2435    Net +1611 -1193.6 -2435                 Lines/Drains/Airways     Peripherally Inserted Central Catheter Line                 PICC Double Lumen 03/05/18 1539 right basilic 83 days          Central Venous Catheter Line                 Introducer 05/25/18 1400 right internal jugular 2 days         Percutaneous Central Line Insertion/Assessment - Quad lumen  05/25/18 0800 left subclavian 2 days          Drain                 Chest Tube 05/25/18 1515 Other (Comment) Pleural 2 days         Urethral Catheter 05/25/18 0830 Temperature probe;Straight-tip;Latex 14 Fr. 2 days         Y Chest Tube 3 and 4 05/25/18 1354 3 Right Pleural 32 Fr. 4 Left Pericardial 32 Fr. 2 days          Arterial Line                 Arterial Line 05/25/18 0745 Left Brachial 2 days          Line                 Pacer Wires 05/25/18 1355 2 days          Peripheral Intravenous Line                 Peripheral IV - Single Lumen 05/23/18 1200 Left Forearm 4 days         Peripheral IV - Single Lumen 05/23/18 1700 Left Wrist 4 days         Peripheral IV - Single Lumen 05/27/18 1135 Right Forearm less than 1 day                Physical Exam   Constitutional: He appears well-developed.   Cardiovascular: Normal rate and regular rhythm.    Pulmonary/Chest: Effort normal.   Neurological: He is alert.   Skin: Skin is warm and dry.       Significant Labs:  CBC:   Recent Labs  Lab 05/27/18  0155 05/27/18  1534   WBC 6.99  --    RBC 2.57*  --    HGB 7.9*  --    HCT 24.0*  --    PLT 55* 54*   MCV 93  --    MCH 30.7  --    MCHC 32.9  --      CMP:   Recent Labs  Lab 05/25/18  2117  05/27/18  0155  05/27/18  1734   *  < > 139*  --   --    CALCIUM 8.8  < > 8.8  --   --    ALBUMIN 3.4*  --   --   --   --    PROT 5.5*  --   --   --   --      < > 145  --   --    K 3.7  < > 4.3  < > 4.1   CO2 21*  < > 24  --   --    *  < > 111*   --   --    BUN 31*  < > 47*  --   --    CREATININE 1.6*  < > 2.0*  --   --    ALKPHOS 51*  --   --   --   --    ALT 15  --   --   --   --    AST 77*  --   --   --   --    BILITOT 1.5*  --   --   --   --    < > = values in this interval not displayed.

## 2018-05-27 NOTE — PROGRESS NOTES
[facility]  Cardiothoracic Surgery  Progress Note    Patient Name: Rodolfo Messina  MRN: [id number]  Admission Date: 5/23/2018  Hospital Length of Stay: 3 days  Code Status: Full Code   Attending Physician: Marvin Go MD   Referring Provider: Marvin Go MD  Principal Problem:S/P MVR (mitral valve replacement)    Subjective:     Post-Op Info:  Procedure(s) (LRB):  Mitral Valve Replacement (N/A)  AORTOCORONARY BYPASS-CABG (N/A)   1 Day Post-Op     Interval History: No acute events overnight. Plan for IABP out today then wean to extubate. Intubated and sedated.    Medications:  Continuous Infusions:   epinephrine 0.02 mcg/kg/min (05/26/18 2300)    furosemide (LASIX) 2 mg/mL infusion (non-titrating) 15 mg/hr (05/26/18 2300)    insulin (HUMAN R) infusion (adults) Stopped (05/26/18 1100)    nicardipine 1 mg/hr (05/26/18 2300)    norepinephrine bitartrate-D5W Stopped (05/25/18 1510)    propofol Stopped (05/26/18 1910)     Scheduled Meds:   albuterol-ipratropium  3 mL Nebulization Q4H    aspirin  325 mg Oral Daily    atorvastatin  40 mg Oral Daily    ceFAZolin (ANCEF) IVPB  2 g Intravenous Q8H    famotidine (PF)  20 mg Intravenous BID    mupirocin  1 g Nasal BID    polyethylene glycol  17 g Oral Daily    sodium bicarbonate  50 mEq Intravenous Once    sodium chloride 0.9%  3 mL Intravenous Q8H    tamsulosin  0.4 mg Oral Daily     PRN Meds:albumin human 5%, dextrose 50%, dextrose 50%, fentaNYL, fentaNYL, lactated ringers, [START ON 5/27/2018] lactulose, magnesium sulfate IVPB, metoclopramide HCl, morphine, ondansetron, oxyCODONE, oxyCODONE, potassium chloride in water **AND** potassium chloride in water **AND** potassium chloride in water, sodium phosphate IVPB, sodium phosphate IVPB, sodium phosphate IVPB     Objective:     Vital Signs (Most Recent):  Temp: 98.7 °F (37.1 °C) (05/26/18 2300)  Pulse: 79 (05/26/18 2300)  Resp: 16 (05/26/18 2300)  BP: 128/60 (05/26/18 2300)  SpO2: 97 %  (05/26/18 2300) Vital Signs (24h Range):  Temp:  [97.6 °F (36.4 °C)-99 °F (37.2 °C)] 98.7 °F (37.1 °C)  Pulse:  [60-83] 79  Resp:  [10-26] 16  SpO2:  [96 %-100 %] 97 %  BP: ()/(53-88) 128/60  Arterial Line BP: ()/(46-58) 130/54     Weight: 74.8 kg (164 lb 14.5 oz)  Body mass index is 23.66 kg/m².    SpO2: 97 %  O2 Device (Oxygen Therapy): venti mask    Intake/Output - Last 3 Shifts       05/25 0700 - 05/26 0659 05/26 0700 - 05/27 0659    I.V. (mL/kg) 3004 (40.2) 1161.4 (15.5)    Blood 672     Total Intake(mL/kg) 3676 (49.1) 1161.4 (15.5)    Urine (mL/kg/hr) 1640 (0.9) 1268 (1)    Chest Tube 425 (0.2) 181 (0.1)    Total Output 2065 1449    Net +1611 -287.6                Lines/Drains/Airways     Peripherally Inserted Central Catheter Line                 PICC Double Lumen 03/05/18 1539 right basilic 82 days          Central Venous Catheter Line                 Introducer 05/25/18 1400 right internal jugular 1 day         Percutaneous Central Line Insertion/Assessment - Quad lumen  05/25/18 0800 left subclavian 1 day         Percutaneous Central Line Insertion/Assessment - double lumen  05/25/18 0800 right internal jugular 1 day         Percutaneous Central Line Insertion/Assessment - quad lumen  05/25/18 0815 left subclavian 1 day         Pulmonary Artery Catheter Assessment  05/25/18 0745 1 day          Drain                 Chest Tube 05/25/18 1515 1 Right Mediastinal 1 day         Chest Tube 05/25/18 1515 2 Left Mediastinal 1 day         NG/OG Tube 05/25/18 1549 Right nostril 1 day         Urethral Catheter 05/25/18 0830 Temperature probe;Straight-tip;Latex 14 Fr. 1 day         Y Chest Tube 3 and 4 05/25/18 1354 3 Right Pleural 32 Fr. 4 Left Pericardial 32 Fr. 1 day          Arterial Line                 Arterial Line 05/25/18 0745 Left Brachial 1 day          Line                 Pacer Wires 05/25/18 1355 1 day          Peripheral Intravenous Line                 Peripheral IV - Single Lumen 05/23/18  1200 Left Forearm 3 days         Peripheral IV - Single Lumen 05/23/18 1700 Left Wrist 3 days                Physical Exam   Constitutional: He appears well-developed.   Cardiovascular: Regular rhythm.    Paced at 80   Pulmonary/Chest:   Intubated   Skin: Skin is warm and dry.       Significant Labs:  ABGs:   Recent Labs  Lab 05/26/18 2019   PH 7.520*   PCO2 29.4*   PO2 117*   HCO3 24.0   POCSATURATED 99   BE 1     CBC:   Recent Labs  Lab 05/26/18 0330   WBC 8.57   RBC 2.86*   HGB 8.9*   HCT 26.3*   PLT 77*   MCV 92   MCH 31.1*   MCHC 33.8     CMP:   Recent Labs  Lab 05/25/18 2117 05/26/18  1915   *  < > 150*   CALCIUM 8.8  < > 8.3*   ALBUMIN 3.4*  --   --    PROT 5.5*  --   --      < > 144   K 3.7  < > 4.2   CO2 21*  < > 21*   *  < > 111*   BUN 31*  < > 41*   CREATININE 1.6*  < > 1.9*   ALKPHOS 51*  --   --    ALT 15  --   --    AST 77*  --   --    BILITOT 1.5*  --   --    < > = values in this interval not displayed.  Coagulation:   Recent Labs  Lab 05/26/18 0330   INR 1.1   APTT 28.5     Assessment/Plan:     Chronic systolic congestive heart failure    - IABP remove this am  - NPO advance once extubated  - Wean to extubated once IABP out  - Daily labs  - Pain meds PRN  - ASA, statin  - Continue ICU care            Lupe Hawkins MD  Cardiothoracic Surgery  [facility]

## 2018-05-27 NOTE — ASSESSMENT & PLAN NOTE
- Advance diet  - Off epi; wean cardene as tolerated for MAP <80  - Daily labs  - Pain meds PRN  - ASA, statin  - Mediastinal CTs out  - Continue ICU care

## 2018-05-27 NOTE — ASSESSMENT & PLAN NOTE
- IABP remove this am  - NPO advance once extubated  - Wean to extubated once IABP out  - Daily labs  - Pain meds PRN  - ASA, statin  - Continue ICU care

## 2018-05-28 ENCOUNTER — ANESTHESIA EVENT (OUTPATIENT)
Dept: MEDSURG UNIT | Facility: HOSPITAL | Age: 68
End: 2018-05-28

## 2018-05-28 ENCOUNTER — ANESTHESIA (OUTPATIENT)
Dept: MEDSURG UNIT | Facility: HOSPITAL | Age: 68
End: 2018-05-28

## 2018-05-28 PROBLEM — R00.1 JUNCTIONAL BRADYCARDIA: Status: ACTIVE | Noted: 2018-05-28

## 2018-05-28 PROBLEM — I44.2 COMPLETE HEART BLOCK: Status: ACTIVE | Noted: 2018-05-28

## 2018-05-28 LAB
ANION GAP SERPL CALC-SCNC: 14 MMOL/L
AORTIC VALVE REGURGITATION: NORMAL
APTT BLDCRRT: 35.7 SEC
BASOPHILS # BLD AUTO: 0 K/UL
BASOPHILS NFR BLD: 0 %
BUN SERPL-MCNC: 66 MG/DL
CALCIUM SERPL-MCNC: 9.2 MG/DL
CHLORIDE SERPL-SCNC: 110 MMOL/L
CO2 SERPL-SCNC: 25 MMOL/L
CREAT SERPL-MCNC: 2.3 MG/DL
DIFFERENTIAL METHOD: ABNORMAL
EOSINOPHIL # BLD AUTO: 0 K/UL
EOSINOPHIL NFR BLD: 0.2 %
ERYTHROCYTE [DISTWIDTH] IN BLOOD BY AUTOMATED COUNT: 15.9 %
EST. GFR  (AFRICAN AMERICAN): 32.7 ML/MIN/1.73 M^2
EST. GFR  (NON AFRICAN AMERICAN): 28.3 ML/MIN/1.73 M^2
ESTIMATED PA SYSTOLIC PRESSURE: 30.46
GLUCOSE SERPL-MCNC: 105 MG/DL
HCT VFR BLD AUTO: 25 %
HGB BLD-MCNC: 8.2 G/DL
IMM GRANULOCYTES # BLD AUTO: 0.02 K/UL
IMM GRANULOCYTES NFR BLD AUTO: 0.3 %
INR PPP: 1.4
LYMPHOCYTES # BLD AUTO: 0.3 K/UL
LYMPHOCYTES NFR BLD: 4.4 %
MAGNESIUM SERPL-MCNC: 2.6 MG/DL
MAGNESIUM SERPL-MCNC: 2.7 MG/DL
MAGNESIUM SERPL-MCNC: 2.8 MG/DL
MCH RBC QN AUTO: 30.8 PG
MCHC RBC AUTO-ENTMCNC: 32.8 G/DL
MCV RBC AUTO: 94 FL
MONOCYTES # BLD AUTO: 0.3 K/UL
MONOCYTES NFR BLD: 4.2 %
NEUTROPHILS # BLD AUTO: 6.1 K/UL
NEUTROPHILS NFR BLD: 90.9 %
NRBC BLD-RTO: 0 /100 WBC
PLATELET # BLD AUTO: 71 K/UL
PMV BLD AUTO: 12.5 FL
POCT GLUCOSE: 101 MG/DL (ref 70–110)
POTASSIUM SERPL-SCNC: 3.9 MMOL/L
POTASSIUM SERPL-SCNC: 4.1 MMOL/L
POTASSIUM SERPL-SCNC: 4.1 MMOL/L
POTASSIUM SERPL-SCNC: 4.4 MMOL/L
PROTHROMBIN TIME: 14.2 SEC
RBC # BLD AUTO: 2.66 M/UL
RETIRED EF AND QEF - SEE NOTES: 50 (ref 55–65)
SODIUM SERPL-SCNC: 149 MMOL/L
TRICUSPID VALVE REGURGITATION: NORMAL
WBC # BLD AUTO: 6.65 K/UL

## 2018-05-28 PROCEDURE — 63600175 PHARM REV CODE 636 W HCPCS: Performed by: THORACIC SURGERY (CARDIOTHORACIC VASCULAR SURGERY)

## 2018-05-28 PROCEDURE — 99231 SBSQ HOSP IP/OBS SF/LOW 25: CPT | Mod: ,,, | Performed by: NURSE PRACTITIONER

## 2018-05-28 PROCEDURE — 85610 PROTHROMBIN TIME: CPT

## 2018-05-28 PROCEDURE — 94761 N-INVAS EAR/PLS OXIMETRY MLT: CPT

## 2018-05-28 PROCEDURE — 20000000 HC ICU ROOM

## 2018-05-28 PROCEDURE — 27000221 HC OXYGEN, UP TO 24 HOURS

## 2018-05-28 PROCEDURE — 25000003 PHARM REV CODE 250: Performed by: STUDENT IN AN ORGANIZED HEALTH CARE EDUCATION/TRAINING PROGRAM

## 2018-05-28 PROCEDURE — 92526 ORAL FUNCTION THERAPY: CPT

## 2018-05-28 PROCEDURE — A4216 STERILE WATER/SALINE, 10 ML: HCPCS | Performed by: THORACIC SURGERY (CARDIOTHORACIC VASCULAR SURGERY)

## 2018-05-28 PROCEDURE — 85025 COMPLETE CBC W/AUTO DIFF WBC: CPT

## 2018-05-28 PROCEDURE — 93306 TTE W/DOPPLER COMPLETE: CPT | Mod: 26,,, | Performed by: INTERNAL MEDICINE

## 2018-05-28 PROCEDURE — 93010 ELECTROCARDIOGRAM REPORT: CPT | Mod: ,,, | Performed by: INTERNAL MEDICINE

## 2018-05-28 PROCEDURE — 25000003 PHARM REV CODE 250: Performed by: THORACIC SURGERY (CARDIOTHORACIC VASCULAR SURGERY)

## 2018-05-28 PROCEDURE — 93005 ELECTROCARDIOGRAM TRACING: CPT

## 2018-05-28 PROCEDURE — 84132 ASSAY OF SERUM POTASSIUM: CPT | Mod: 91

## 2018-05-28 PROCEDURE — P9045 ALBUMIN (HUMAN), 5%, 250 ML: HCPCS | Mod: JG | Performed by: STUDENT IN AN ORGANIZED HEALTH CARE EDUCATION/TRAINING PROGRAM

## 2018-05-28 PROCEDURE — 85730 THROMBOPLASTIN TIME PARTIAL: CPT

## 2018-05-28 PROCEDURE — 63600175 PHARM REV CODE 636 W HCPCS: Performed by: STUDENT IN AN ORGANIZED HEALTH CARE EDUCATION/TRAINING PROGRAM

## 2018-05-28 PROCEDURE — 83735 ASSAY OF MAGNESIUM: CPT

## 2018-05-28 PROCEDURE — 94799 UNLISTED PULMONARY SVC/PX: CPT

## 2018-05-28 PROCEDURE — 93306 TTE W/DOPPLER COMPLETE: CPT

## 2018-05-28 PROCEDURE — 80048 BASIC METABOLIC PNL TOTAL CA: CPT

## 2018-05-28 PROCEDURE — 99223 1ST HOSP IP/OBS HIGH 75: CPT | Mod: GC,,, | Performed by: INTERNAL MEDICINE

## 2018-05-28 PROCEDURE — 25000003 PHARM REV CODE 250

## 2018-05-28 RX ORDER — DOXAZOSIN 4 MG/1
4 TABLET ORAL DAILY
Status: DISCONTINUED | OUTPATIENT
Start: 2018-05-28 | End: 2018-05-30

## 2018-05-28 RX ORDER — ACETAMINOPHEN 10 MG/ML
1000 INJECTION, SOLUTION INTRAVENOUS EVERY 6 HOURS
Status: DISPENSED | OUTPATIENT
Start: 2018-05-28 | End: 2018-05-29

## 2018-05-28 RX ORDER — METOPROLOL TARTRATE 1 MG/ML
2.5 INJECTION, SOLUTION INTRAVENOUS EVERY 12 HOURS
Status: DISCONTINUED | OUTPATIENT
Start: 2018-05-28 | End: 2018-05-28

## 2018-05-28 RX ORDER — POTASSIUM CHLORIDE 7.45 MG/ML
10 INJECTION INTRAVENOUS
Status: COMPLETED | OUTPATIENT
Start: 2018-05-28 | End: 2018-05-28

## 2018-05-28 RX ORDER — ASPIRIN 300 MG/1
300 SUPPOSITORY RECTAL EVERY EVENING
Status: DISCONTINUED | OUTPATIENT
Start: 2018-05-28 | End: 2018-05-30

## 2018-05-28 RX ORDER — BISACODYL 10 MG
10 SUPPOSITORY, RECTAL RECTAL ONCE
Status: COMPLETED | OUTPATIENT
Start: 2018-05-28 | End: 2018-05-28

## 2018-05-28 RX ORDER — HYDRALAZINE HYDROCHLORIDE 20 MG/ML
20 INJECTION INTRAMUSCULAR; INTRAVENOUS EVERY 6 HOURS PRN
Status: DISCONTINUED | OUTPATIENT
Start: 2018-05-28 | End: 2018-06-04

## 2018-05-28 RX ORDER — ALBUMIN HUMAN 50 G/1000ML
12.5 SOLUTION INTRAVENOUS ONCE
Status: COMPLETED | OUTPATIENT
Start: 2018-05-28 | End: 2018-05-28

## 2018-05-28 RX ORDER — FAMOTIDINE 20 MG/1
20 TABLET, FILM COATED ORAL DAILY
Status: DISCONTINUED | OUTPATIENT
Start: 2018-05-28 | End: 2018-05-30

## 2018-05-28 RX ORDER — MORPHINE SULFATE 2 MG/ML
2 INJECTION, SOLUTION INTRAMUSCULAR; INTRAVENOUS EVERY 4 HOURS PRN
Status: DISCONTINUED | OUTPATIENT
Start: 2018-05-28 | End: 2018-05-28

## 2018-05-28 RX ORDER — BISACODYL 10 MG
10 SUPPOSITORY, RECTAL RECTAL DAILY PRN
Status: DISCONTINUED | OUTPATIENT
Start: 2018-05-28 | End: 2018-06-20 | Stop reason: HOSPADM

## 2018-05-28 RX ORDER — AMLODIPINE BESYLATE 5 MG/1
5 TABLET ORAL DAILY
Status: DISCONTINUED | OUTPATIENT
Start: 2018-05-28 | End: 2018-05-30

## 2018-05-28 RX ADMIN — WARFARIN SODIUM 3 MG: 2 TABLET ORAL at 04:05

## 2018-05-28 RX ADMIN — ALBUMIN HUMAN 12.5 G: 0.05 INJECTION, SOLUTION INTRAVENOUS at 11:05

## 2018-05-28 RX ADMIN — ACETAMINOPHEN 1000 MG: 10 INJECTION, SOLUTION INTRAVENOUS at 12:05

## 2018-05-28 RX ADMIN — Medication 3 ML: at 06:05

## 2018-05-28 RX ADMIN — FUROSEMIDE 7.5 MG/HR: 10 INJECTION, SOLUTION INTRAMUSCULAR; INTRAVENOUS at 11:05

## 2018-05-28 RX ADMIN — MUPIROCIN 1 G: 20 OINTMENT TOPICAL at 08:05

## 2018-05-28 RX ADMIN — FAMOTIDINE 20 MG: 20 TABLET ORAL at 08:05

## 2018-05-28 RX ADMIN — ASPIRIN 300 MG: 300 SUPPOSITORY RECTAL at 06:05

## 2018-05-28 RX ADMIN — Medication 3 ML: at 10:05

## 2018-05-28 RX ADMIN — FUROSEMIDE 15 MG/HR: 10 INJECTION, SOLUTION INTRAMUSCULAR; INTRAVENOUS at 03:05

## 2018-05-28 RX ADMIN — POLYETHYLENE GLYCOL 3350 17 G: 17 POWDER, FOR SOLUTION ORAL at 08:05

## 2018-05-28 RX ADMIN — NICARDIPINE HYDROCHLORIDE 5 MG/HR: 0.2 INJECTION, SOLUTION INTRAVENOUS at 07:05

## 2018-05-28 RX ADMIN — ATORVASTATIN CALCIUM 40 MG: 20 TABLET, FILM COATED ORAL at 08:05

## 2018-05-28 RX ADMIN — NICARDIPINE HYDROCHLORIDE 5 MG/HR: 0.2 INJECTION, SOLUTION INTRAVENOUS at 03:05

## 2018-05-28 RX ADMIN — POTASSIUM CHLORIDE 10 MEQ: 10 INJECTION, SOLUTION INTRAVENOUS at 02:05

## 2018-05-28 RX ADMIN — DOXAZOSIN MESYLATE 4 MG: 2 TABLET ORAL at 08:05

## 2018-05-28 RX ADMIN — ACETAMINOPHEN 1000 MG: 10 INJECTION, SOLUTION INTRAVENOUS at 05:05

## 2018-05-28 RX ADMIN — MUPIROCIN 1 G: 20 OINTMENT TOPICAL at 09:05

## 2018-05-28 RX ADMIN — POTASSIUM CHLORIDE 10 MEQ: 10 INJECTION, SOLUTION INTRAVENOUS at 04:05

## 2018-05-28 RX ADMIN — BISACODYL 10 MG: 10 SUPPOSITORY RECTAL at 07:05

## 2018-05-28 NOTE — HPI
66yo M EP consulted for bradycardia.  Mitral valve endocarditis and resultant severe MR, s/p bioprosthetic mitral valve replacement and CABG x1 on 05/25/2018. Baseline rhythm NSR, thereafter with accelerated junctional rhythm. From yesterday night noted to become bradycardic (on cardene gtt, unclear if there was any hypotension as a result of the bradycardia). This AM rhythm VVI 80 bpm, with underlying rhythm junctional bradycardia 46 bpm, no hypotension. On Lopressor 12.5mg yesterday.

## 2018-05-28 NOTE — PROGRESS NOTES
[facility]  Cardiothoracic Surgery  Progress Note    Patient Name: Rodolfo Messina  MRN: [id number]  Admission Date: 5/23/2018  Hospital Length of Stay: 5 days  Code Status: Full Code   Attending Physician: Marvin Go MD   Referring Provider: Marvin Go MD  Principal Problem:S/P MVR (mitral valve replacement)    Subjective:     Post-Op Info:  Procedure(s) (LRB):  Mitral Valve Replacement (N/A)  AORTOCORONARY BYPASS-CABG (N/A)   3 Days Post-Op     Interval History: Overnight needed to be paced again. OOBTC this am, doing well. SLP saw and said NPO. Will plan for modified barium swallow.    Medications:  Continuous Infusions:   furosemide (LASIX) 2 mg/mL infusion (non-titrating) 7.5 mg/hr (05/28/18 1300)    nicardipine Stopped (05/28/18 0900)    norepinephrine bitartrate-D5W Stopped (05/25/18 1510)    propofol Stopped (05/26/18 1910)     Scheduled Meds:   acetaminophen  1,000 mg Intravenous Q6H    amLODIPine  5 mg Oral Daily    aspirin  300 mg Rectal Daily    atorvastatin  40 mg Oral Daily    doxazosin  4 mg Oral Daily    famotidine  20 mg Oral Daily    mupirocin  1 g Nasal BID    polyethylene glycol  17 g Oral Daily    potassium chloride in water  20 mEq Intravenous Once    sodium chloride 0.9%  3 mL Intravenous Q8H    warfarin  3 mg Oral Daily     PRN Meds:albumin human 5%, albuterol-ipratropium, bisacodyl, hydrALAZINE, lactated ringers, lactulose, magnesium sulfate IVPB, metoclopramide HCl, ondansetron, potassium chloride in water **AND** potassium chloride in water **AND** potassium chloride in water, sodium phosphate IVPB, sodium phosphate IVPB, sodium phosphate IVPB, white petrolatum-mineral oil     Objective:     Vital Signs (Most Recent):  Temp: 97.8 °F (36.6 °C) (05/28/18 1100)  Pulse: 79 (05/28/18 1300)  Resp: 16 (05/28/18 1300)  BP: (!) 118/59 (05/28/18 1300)  SpO2: 98 % (05/28/18 1339) Vital Signs (24h Range):  Temp:  [97.5 °F (36.4 °C)-98.6 °F (37 °C)] 97.8 °F  (36.6 °C)  Pulse:  [54-83] 79  Resp:  [0-29] 16  SpO2:  [92 %-99 %] 98 %  BP: (105-154)/(51-70) 118/59  Arterial Line BP: ()/(43-98) 113/50     Weight: 69.9 kg (154 lb 1.6 oz)  Body mass index is 22.11 kg/m².    SpO2: 98 %  O2 Device (Oxygen Therapy): nasal cannula w/ humidification    Intake/Output - Last 3 Shifts       05/26 0700 - 05/27 0659 05/27 0700 - 05/28 0659 05/28 0700 - 05/29 0659    I.V. (mL/kg) 1354.4 (18.2) 494.9 (7.1)     Total Intake(mL/kg) 1354.4 (18.2) 494.9 (7.1)     Urine (mL/kg/hr) 2323 (1.3) 4575 (2.7) 985 (2)    Chest Tube 225 (0.1) 270 (0.2)     Total Output 2548 4845 985    Net -1193.6 -4350.2 -985           Urine Occurrence  1 x           Lines/Drains/Airways     Peripherally Inserted Central Catheter Line                 PICC Double Lumen 03/05/18 1539 right basilic 83 days          Central Venous Catheter Line                 Percutaneous Central Line Insertion/Assessment - Quad lumen  05/25/18 0800 left subclavian 3 days          Drain                 Chest Tube 05/25/18 1515 Other (Comment) Pleural 2 days         Y Chest Tube 3 and 4 05/25/18 1354 3 Right Pleural 32 Fr. 4 Left Pericardial 32 Fr. 2 days         Urethral Catheter 05/28/18 0245 less than 1 day          Arterial Line                 Arterial Line 05/25/18 0745 Left Brachial 3 days          Line                 Pacer Wires 05/25/18 1355 2 days          Peripheral Intravenous Line                 Peripheral IV - Single Lumen 05/23/18 1200 Left Forearm 5 days         Peripheral IV - Single Lumen 05/23/18 1700 Left Wrist 4 days         Peripheral IV - Single Lumen 05/27/18 1135 Right Forearm 1 day                Physical Exam   Constitutional: He appears well-developed.   Cardiovascular:   Paced  Incision c/d/i   Pulmonary/Chest: Effort normal.   Neurological: He is alert.   Skin: Skin is warm and dry.       Significant Labs:  CBC:   Recent Labs  Lab 05/28/18  0303   WBC 6.65   RBC 2.66*   HGB 8.2*   HCT 25.0*   PLT 71*    MCV 94   MCH 30.8   MCHC 32.8     CMP:   Recent Labs  Lab 05/28/18  0303 05/28/18  1208     --    CALCIUM 9.2  --    *  --    K 4.1  4.1 3.9   CO2 25  --      --    BUN 66*  --    CREATININE 2.3*  --      Assessment/Plan:     Chronic systolic congestive heart failure    - Advance diet  - Off epi; wean cardene as tolerated for MAP <80  - Daily labs  - Pain meds PRN  - ASA, statin  - D/c beta blocker  - Remove remaining CT today  - Continue ICU care            Lupe Hawkins MD  Cardiothoracic Surgery  [facility]

## 2018-05-28 NOTE — SUBJECTIVE & OBJECTIVE
"Interval HPI:   Overnight events: remains in ICU on pressors. Restarted on cardene overnight. BG remains at goal without insulin.  Eating:   NPO, repeat swallow study indicated per speech  Nausea: No  Hypoglycemia and intervention: No  Fever: No  TPN and/or TF: No    BP (!) 127/59 (BP Location: Right arm, Patient Position: Lying)   Pulse 80   Temp 97.9 °F (36.6 °C) (Oral)   Resp (!) 22   Ht 5' 10" (1.778 m)   Wt 69.9 kg (154 lb 1.6 oz)   SpO2 96%   BMI 22.11 kg/m²     Labs Reviewed and Include      Recent Labs  Lab 05/28/18  0303      CALCIUM 9.2   *   K 4.1  4.1   CO2 25      BUN 66*   CREATININE 2.3*     Lab Results   Component Value Date    WBC 6.65 05/28/2018    HGB 8.2 (L) 05/28/2018    HCT 25.0 (L) 05/28/2018    MCV 94 05/28/2018    PLT 71 (L) 05/28/2018     No results for input(s): TSH, FREET4 in the last 168 hours.  Lab Results   Component Value Date    HGBA1C 5.4 02/22/2018       Nutritional status:   Body mass index is 22.11 kg/m².  Lab Results   Component Value Date    ALBUMIN 3.4 (L) 05/25/2018    ALBUMIN 3.7 05/25/2018    ALBUMIN 3.3 (L) 05/24/2018     Lab Results   Component Value Date    PREALBUMIN 12 (L) 02/22/2018       Estimated Creatinine Clearance: 30.8 mL/min (A) (based on SCr of 2.3 mg/dL (H)).    Accu-Checks  Recent Labs      05/27/18   0309  05/27/18   0438  05/27/18   0557  05/27/18   0712  05/27/18   0821  05/27/18   1131  05/27/18   1542  05/27/18   1737  05/27/18   2301  05/28/18   0609   POCTGLUCOSE  135*  129*  134*  123*  129*  118*  117*  93  99  101       Pressors:    Autonomic Drugs     Start     Stop Route Frequency Ordered    05/25/18 1600  norepinephrine 4 mg in dextrose 5% 250 mL infusion (premix) (titrating)     Question Answer Comment   Titrate by: (in mcg/kg/min) 0.05    Titrate interval: (in minutes) 5    Titrate to maintain: (MAP or SBP) MAP    Greater than: (in mmHg) 60    Maximum dose: (in mcg/kg/min) 3        -- IV Continuous 05/25/18 1511    "     Hyperglycemia/Diabetes Medications: Antihyperglycemics     Start     Stop Route Frequency Ordered    05/27/18 1054  insulin aspart U-100 pen 1-10 Units      -- SubQ Every 6 hours PRN 05/27/18 0937

## 2018-05-28 NOTE — PLAN OF CARE
Problem: SLP Goal  Goal: SLP Goal  Speech Language Pathology Goals  Goals expected to be met by 6/4:  1. Pt will participate in ongoing swallowing assessment to determine safest and least restrictive diet.          When team feels appropriate, recommend Modified Barium Swallow Study to further assess safety of swallow function radiographically.   Full session note to follow.     IRVIN Wynn, CCC-SLP  Speech Language Pathologist  [phone]  5/28/2018

## 2018-05-28 NOTE — SUBJECTIVE & OBJECTIVE
Interval History: Overnight needed to be paced again. OOBTC this am, doing well. SLP saw and said NPO. Will plan for modified barium swallow.    Medications:  Continuous Infusions:   furosemide (LASIX) 2 mg/mL infusion (non-titrating) 7.5 mg/hr (05/28/18 1300)    nicardipine Stopped (05/28/18 0900)    norepinephrine bitartrate-D5W Stopped (05/25/18 1510)    propofol Stopped (05/26/18 1910)     Scheduled Meds:   acetaminophen  1,000 mg Intravenous Q6H    amLODIPine  5 mg Oral Daily    aspirin  300 mg Rectal Daily    atorvastatin  40 mg Oral Daily    doxazosin  4 mg Oral Daily    famotidine  20 mg Oral Daily    mupirocin  1 g Nasal BID    polyethylene glycol  17 g Oral Daily    potassium chloride in water  20 mEq Intravenous Once    sodium chloride 0.9%  3 mL Intravenous Q8H    warfarin  3 mg Oral Daily     PRN Meds:albumin human 5%, albuterol-ipratropium, bisacodyl, hydrALAZINE, lactated ringers, lactulose, magnesium sulfate IVPB, metoclopramide HCl, ondansetron, potassium chloride in water **AND** potassium chloride in water **AND** potassium chloride in water, sodium phosphate IVPB, sodium phosphate IVPB, sodium phosphate IVPB, white petrolatum-mineral oil     Objective:     Vital Signs (Most Recent):  Temp: 97.8 °F (36.6 °C) (05/28/18 1100)  Pulse: 79 (05/28/18 1300)  Resp: 16 (05/28/18 1300)  BP: (!) 118/59 (05/28/18 1300)  SpO2: 98 % (05/28/18 1339) Vital Signs (24h Range):  Temp:  [97.5 °F (36.4 °C)-98.6 °F (37 °C)] 97.8 °F (36.6 °C)  Pulse:  [54-83] 79  Resp:  [0-29] 16  SpO2:  [92 %-99 %] 98 %  BP: (105-154)/(51-70) 118/59  Arterial Line BP: ()/(43-98) 113/50     Weight: 69.9 kg (154 lb 1.6 oz)  Body mass index is 22.11 kg/m².    SpO2: 98 %  O2 Device (Oxygen Therapy): nasal cannula w/ humidification    Intake/Output - Last 3 Shifts       05/26 0700 - 05/27 0659 05/27 0700 - 05/28 0659 05/28 0700 - 05/29 0659    I.V. (mL/kg) 1354.4 (18.2) 494.9 (7.1)     Total Intake(mL/kg) 1354.4 (18.2)  494.9 (7.1)     Urine (mL/kg/hr) 2323 (1.3) 4575 (2.7) 985 (2)    Chest Tube 225 (0.1) 270 (0.2)     Total Output 2548 4845 985    Net -1193.6 -4350.2 -985           Urine Occurrence  1 x           Lines/Drains/Airways     Peripherally Inserted Central Catheter Line                 PICC Double Lumen 03/05/18 1539 right basilic 83 days          Central Venous Catheter Line                 Percutaneous Central Line Insertion/Assessment - Quad lumen  05/25/18 0800 left subclavian 3 days          Drain                 Chest Tube 05/25/18 1515 Other (Comment) Pleural 2 days         Y Chest Tube 3 and 4 05/25/18 1354 3 Right Pleural 32 Fr. 4 Left Pericardial 32 Fr. 2 days         Urethral Catheter 05/28/18 0245 less than 1 day          Arterial Line                 Arterial Line 05/25/18 0745 Left Brachial 3 days          Line                 Pacer Wires 05/25/18 1355 2 days          Peripheral Intravenous Line                 Peripheral IV - Single Lumen 05/23/18 1200 Left Forearm 5 days         Peripheral IV - Single Lumen 05/23/18 1700 Left Wrist 4 days         Peripheral IV - Single Lumen 05/27/18 1135 Right Forearm 1 day                Physical Exam   Constitutional: He appears well-developed.   Cardiovascular:   Paced  Incision c/d/i   Pulmonary/Chest: Effort normal.   Neurological: He is alert.   Skin: Skin is warm and dry.       Significant Labs:  CBC:   Recent Labs  Lab 05/28/18  0303   WBC 6.65   RBC 2.66*   HGB 8.2*   HCT 25.0*   PLT 71*   MCV 94   MCH 30.8   MCHC 32.8     CMP:   Recent Labs  Lab 05/28/18  0303 05/28/18  1208     --    CALCIUM 9.2  --    *  --    K 4.1  4.1 3.9   CO2 25  --      --    BUN 66*  --    CREATININE 2.3*  --

## 2018-05-28 NOTE — ASSESSMENT & PLAN NOTE
BG goal 110-140     Discontinue bg monitoring and correction scale coverage.   BG at goal without insulin; no history of DM.   Will sign off.       Please page endocrine NP on call with any BG related issues or concerns. Thanks.       Discharge plans-TBD

## 2018-05-28 NOTE — PLAN OF CARE
Problem: Patient Care Overview  Goal: Plan of Care Review  Hx:  Mitral regurg (severe), Aortic regurg (mild), Pulm HTN, HTN   Outcome: Ongoing (interventions implemented as appropriate)  Patient weaned down to 3L O2 nasal cannula, SATs maintained above 95%. MAPs 60-80 through shift. Heart rate 80, paced rhythm. EKG at 0900 confirmed junctional underlying rhythm, 30-47bpm while unpaced. Cardene D/C'd, lasix now 7.5mg/hr. Patient was up to chair for 5 hours today, took 20 steps. Reported feeling lightheaded and has intermittent hallucinations through shift. Remains AAOx4. Patient turned Q2, all questions answered by RN, will continue to monitor.

## 2018-05-28 NOTE — SUBJECTIVE & OBJECTIVE
Past Medical History:   Diagnosis Date    ANNA (acute kidney injury) 2/22/2018    Anemia     Anxiety     Arthritis     BPH (benign prostatic hyperplasia)     CHF (congestive heart failure) 5/23/2018    Coronary artery disease of native artery of native heart with stable angina pectoris 5/21/2018    Diverticulosis     Encounter for blood transfusion     Hypertension     Urinary retention        Past Surgical History:   Procedure Laterality Date    APPENDECTOMY      COLONOSCOPY      COLONOSCOPY N/A 2/1/2018    Procedure: COLONOSCOPY;  Surgeon: Richar Payan MD;  Location: [address]);  Service: Endoscopy;  Laterality: N/A;  PM prep    EYE SURGERY Right     cataract extraction    FINGER SURGERY      FRACTURE SURGERY Right     index finger    TONSILLECTOMY         Review of patient's allergies indicates:   Allergen Reactions    Shellfish containing products     Augmentin [amoxicillin-pot clavulanate]      Patient felt that it raised BP and requested to have it added as intolerance. Tolerated unasyn    Ciprofloxacin     Flagyl [metronidazole]     Lisinopril Other (See Comments)     cough    Mysoline [primidone]     Omnicef [cefdinir]        No current facility-administered medications on file prior to encounter.      Current Outpatient Prescriptions on File Prior to Encounter   Medication Sig    albuterol-ipratropium 2.5mg-0.5mg/3mL (DUO-NEB) 0.5 mg-3 mg(2.5 mg base)/3 mL nebulizer solution 3 mLs every 4 (four) hours as needed.     aspirin (ECOTRIN) 81 MG EC tablet Take 1 tablet (81 mg total) by mouth once daily.    atorvastatin (LIPITOR) 20 MG tablet Take 2 tablets (40 mg total) by mouth once daily.    benzonatate (TESSALON) 100 MG capsule Take 100 mg by mouth 3 (three) times daily as needed for Cough.    furosemide (LASIX) 40 MG tablet Take 1 tablet (40 mg total) by mouth once daily.    loratadine (CLARITIN) 10 mg tablet Take 10 mg by mouth once daily.    mirtazapine (REMERON)  30 MG tablet Take 1 tablet (30 mg total) by mouth every evening.    pantoprazole (PROTONIX) 40 MG tablet Take 1 tablet (40 mg total) by mouth once daily.    tamsulosin (FLOMAX) 0.4 mg Cp24 Take 2 capsules (0.8 mg total) by mouth 2 (two) times daily.    VENTOLIN HFA 90 mcg/actuation inhaler      Family History     Problem Relation (Age of Onset)    Heart disease Mother, Father    No Known Problems Sister, Brother    Stroke Mother        Social History Main Topics    Smoking status: Never Smoker    Smokeless tobacco: Never Used    Alcohol use No      Comment: none recently    Drug use: No    Sexual activity: Not Currently     Partners: Female     Review of Systems   All other systems reviewed and are negative.    Objective:     Vital Signs (Most Recent):  Temp: 97.9 °F (36.6 °C) (05/28/18 0730)  Pulse: 79 (05/28/18 0900)  Resp: (!) 24 (05/28/18 0900)  BP: 124/60 (05/28/18 0900)  SpO2: 97 % (05/28/18 0900) Vital Signs (24h Range):  Temp:  [97.5 °F (36.4 °C)-98.6 °F (37 °C)] 97.9 °F (36.6 °C)  Pulse:  [54-90] 79  Resp:  [0-29] 24  SpO2:  [86 %-99 %] 97 %  BP: (115-154)/(55-70) 124/60  Arterial Line BP: (105-163)/(38-98) 112/98     Weight: 69.9 kg (154 lb 1.6 oz)  Body mass index is 22.11 kg/m².    SpO2: 97 %  O2 Device (Oxygen Therapy): nasal cannula w/ humidification    Physical Exam   Constitutional: He is oriented to person, place, and time. He appears well-developed and well-nourished.   HENT:   Head: Normocephalic and atraumatic.   Eyes: No scleral icterus.   Cardiovascular: Regular rhythm.    Pulmonary/Chest: Effort normal and breath sounds normal. No respiratory distress. He has no wheezes. He has no rales.   Musculoskeletal: He exhibits no edema.   Neurological: He is alert and oriented to person, place, and time.   Skin: Skin is warm.       Significant Labs: All pertinent lab results from the last 24 hours have been reviewed.    Significant Imaging: EKG: Junctional bradycardia 46 bpm

## 2018-05-28 NOTE — PLAN OF CARE
Patient is on 5L nasal cannula and remains on Cardene gtt at 5 mg/hr and Lasix gtt 15mg/hr.  Bladder scan done 3 hrs post lackey removal showing 690cc. MD notified and straight cath performed per MD order. Second bladder scan done showing >900cc. Lackey cath inserted after pt unable to void spontaneously.  After several episodes of symptomatic bradycardia, patient's V pacing wires reattached to bedside pacer at 80 bpm per MD order. Patient positive for ICU delirium, MDs notified. Plan of care reviewed with patient and spouse at bedside.

## 2018-05-28 NOTE — CONSULTS
[facility]  Cardiac Electrophysiology  Consult Note    Admission Date: 5/23/2018  Code Status: Full Code   Attending Provider: Marvin Go MD  Consulting Provider: Bonifacio Finley MD  Principal Problem:S/P MVR (mitral valve replacement)    Inpatient consult to Electrophysiology  Consult performed by: BONIFACIO FINLEY  Consult ordered by: LOLUOU CUMMINGS        Subjective:     Chief Complaint:  Bradycardia     HPI:   66yo M EP consulted for bradycardia.  Mitral valve endocarditis and resultant severe MR, s/p bioprosthetic mitral valve replacement and CABG x1 on 05/25/2018. Baseline rhythm NSR, thereafter with accelerated junctional rhythm. From yesterday night noted to become bradycardic (on cardene gtt, unclear if there was any hypotension as a result of the bradycardia). This AM rhythm VVI 80 bpm, with underlying rhythm junctional bradycardia 46 bpm, no hypotension. On Lopressor 12.5mg yesterday.     Past Medical History:   Diagnosis Date    ANNA (acute kidney injury) 2/22/2018    Anemia     Anxiety     Arthritis     BPH (benign prostatic hyperplasia)     CHF (congestive heart failure) 5/23/2018    Coronary artery disease of native artery of native heart with stable angina pectoris 5/21/2018    Diverticulosis     Encounter for blood transfusion     Hypertension     Urinary retention        Past Surgical History:   Procedure Laterality Date    APPENDECTOMY      COLONOSCOPY      COLONOSCOPY N/A 2/1/2018    Procedure: COLONOSCOPY;  Surgeon: Richar Payan MD;  Location: [address]);  Service: Endoscopy;  Laterality: N/A;  PM prep    EYE SURGERY Right     cataract extraction    FINGER SURGERY      FRACTURE SURGERY Right     index finger    TONSILLECTOMY         Review of patient's allergies indicates:   Allergen Reactions    Shellfish containing products     Augmentin [amoxicillin-pot clavulanate]      Patient felt that it raised BP and requested  to have it added as intolerance. Tolerated unasyn    Ciprofloxacin     Flagyl [metronidazole]     Lisinopril Other (See Comments)     cough    Mysoline [primidone]     Omnicef [cefdinir]        No current facility-administered medications on file prior to encounter.      Current Outpatient Prescriptions on File Prior to Encounter   Medication Sig    albuterol-ipratropium 2.5mg-0.5mg/3mL (DUO-NEB) 0.5 mg-3 mg(2.5 mg base)/3 mL nebulizer solution 3 mLs every 4 (four) hours as needed.     aspirin (ECOTRIN) 81 MG EC tablet Take 1 tablet (81 mg total) by mouth once daily.    atorvastatin (LIPITOR) 20 MG tablet Take 2 tablets (40 mg total) by mouth once daily.    benzonatate (TESSALON) 100 MG capsule Take 100 mg by mouth 3 (three) times daily as needed for Cough.    furosemide (LASIX) 40 MG tablet Take 1 tablet (40 mg total) by mouth once daily.    loratadine (CLARITIN) 10 mg tablet Take 10 mg by mouth once daily.    mirtazapine (REMERON) 30 MG tablet Take 1 tablet (30 mg total) by mouth every evening.    pantoprazole (PROTONIX) 40 MG tablet Take 1 tablet (40 mg total) by mouth once daily.    tamsulosin (FLOMAX) 0.4 mg Cp24 Take 2 capsules (0.8 mg total) by mouth 2 (two) times daily.    VENTOLIN HFA 90 mcg/actuation inhaler      Family History     Problem Relation (Age of Onset)    Heart disease Mother, Father    No Known Problems Sister, Brother    Stroke Mother        Social History Main Topics    Smoking status: Never Smoker    Smokeless tobacco: Never Used    Alcohol use No      Comment: none recently    Drug use: No    Sexual activity: Not Currently     Partners: Female     Review of Systems   All other systems reviewed and are negative.    Objective:     Vital Signs (Most Recent):  Temp: 97.9 °F (36.6 °C) (05/28/18 0730)  Pulse: 79 (05/28/18 0900)  Resp: (!) 24 (05/28/18 0900)  BP: 124/60 (05/28/18 0900)  SpO2: 97 % (05/28/18 0900) Vital Signs (24h Range):  Temp:  [97.5 °F (36.4 °C)-98.6 °F (37  °C)] 97.9 °F (36.6 °C)  Pulse:  [54-90] 79  Resp:  [0-29] 24  SpO2:  [86 %-99 %] 97 %  BP: (115-154)/(55-70) 124/60  Arterial Line BP: (105-163)/(38-98) 112/98     Weight: 69.9 kg (154 lb 1.6 oz)  Body mass index is 22.11 kg/m².    SpO2: 97 %  O2 Device (Oxygen Therapy): nasal cannula w/ humidification    Physical Exam   Constitutional: He is oriented to person, place, and time. He appears well-developed and well-nourished.   HENT:   Head: Normocephalic and atraumatic.   Eyes: No scleral icterus.   Cardiovascular: Regular rhythm.    Pulmonary/Chest: Effort normal and breath sounds normal. No respiratory distress. He has no wheezes. He has no rales.   Musculoskeletal: He exhibits no edema.   Neurological: He is alert and oriented to person, place, and time.   Skin: Skin is warm.       Significant Labs: All pertinent lab results from the last 24 hours have been reviewed.    Significant Imaging: EKG: Junctional bradycardia 46 bpm    Assessment and Plan:     Complete heart block    66 y/o M with newly developed CHB with junctional escape with rates in the 40's, atrial rhythm is new AF. POD# 3 s/p bioprostethetic mitral valve replacement and CABG x1, and recent administration of beta blockers (yesterday).     -Hold AV amelia blockers   -Keep epicardial pacer in place (currently threshold is 2mA)  -Repeat TTE to evaluate for mitral valve abscess   -Will elect to cardiovert today afternoon after the echo  -Keep NPO  -Agree with coumadin   -If his CHB persists then will plan for permanent pacemaker             Thank you for your consult. I will follow-up with patient. Please contact us if you have any additional questions.    Bonifacio Finley MD  Cardiac Electrophysiology  [facility]

## 2018-05-28 NOTE — PT/OT/SLP PROGRESS
Occupational Therapy      Patient Name:  Rodolfo Messina   MRN:  [id number]    Patient not seen today secondary pt with unstable BP per nsg and not appropriate for therapy eval this date. OT to check status next date.     MILTON Amaya  5/28/2018

## 2018-05-28 NOTE — ANESTHESIA POSTPROCEDURE EVALUATION
"Anesthesia Post Evaluation    Patient: Rodolfo Messina    Procedure(s) Performed: Procedure(s) (LRB):  Mitral Valve Replacement (N/A)  AORTOCORONARY BYPASS-CABG (N/A)    Final Anesthesia Type: general  Patient location during evaluation: ICU  Patient participation: No - Unable to Participate, Intubation  Level of consciousness: sedated  Post-procedure vital signs: reviewed and stable  Pain management: adequate  Airway patency: patent  PONV status at discharge: No PONV  Anesthetic complications: no      Cardiovascular status: hemodynamically stable  Respiratory status: ETT and ventilator  Hydration status: euvolemic          Visit Vitals  BP (!) 127/59 (BP Location: Right arm, Patient Position: Lying)   Pulse 80   Temp 36.6 °C (97.9 °F) (Oral)   Resp (!) 22   Ht 5' 10" (1.778 m)   Wt 69.9 kg (154 lb 1.6 oz)   SpO2 (!) 92%   BMI 22.11 kg/m²       Pain/Siva Score: Pain Assessment Performed: Yes   Presence of Pain: denies   Pain Rating Prior to Med Admin: 6   Pain Rating Post Med Admin: 2       "

## 2018-05-28 NOTE — SUBJECTIVE & OBJECTIVE
Interval History/Significant Events: Patient back on cardene overnight. Confused this am.    Follow-up For: Procedure(s) (LRB):  Mitral Valve Replacement (N/A)  AORTOCORONARY BYPASS-CABG (N/A)    Post-Operative Day: 3 Days Post-Op    Objective:     Vital Signs (Most Recent):  Temp: 97.6 °F (36.4 °C) (05/28/18 0300)  Pulse: 80 (05/28/18 0600)  Resp: 11 (05/28/18 0600)  BP: (!) 117/59 (05/28/18 0600)  SpO2: 97 % (05/28/18 0600) Vital Signs (24h Range):  Temp:  [97.5 °F (36.4 °C)-98.6 °F (37 °C)] 97.6 °F (36.4 °C)  Pulse:  [54-90] 80  Resp:  [0-29] 11  SpO2:  [86 %-99 %] 97 %  BP: (117-154)/(56-70) 117/59  Arterial Line BP: (105-163)/(38-97) 122/50     Weight: 69.9 kg (154 lb 1.6 oz)  Body mass index is 22.11 kg/m².      Intake/Output Summary (Last 24 hours) at 05/28/18 0630  Last data filed at 05/28/18 0600   Gross per 24 hour   Intake           494.85 ml   Output             4795 ml   Net         -4300.15 ml       Physical Exam   Constitutional: He appears well-developed.   HENT:   Head: Normocephalic and atraumatic.   Eyes: EOM are normal. Pupils are equal, round, and reactive to light.   Cardiovascular: Regular rhythm.    Paced at 80   Pulmonary/Chest:   Extubated, on nasal canula    Neurological:   Confused, only oriented to person   Skin: Skin is warm and dry.       Vents:  Vent Mode: Spont (05/26/18 2025)  Ventilator Initiated: Yes (05/25/18 1509)  Set Rate: 0 bmp (05/26/18 2025)  Vt Set: 500 mL (05/26/18 2025)  Pressure Support: 10 cmH20 (05/26/18 2025)  PEEP/CPAP: 5 cmH20 (05/26/18 2025)  Oxygen Concentration (%): 5 (05/28/18 0600)  Peak Airway Pressure: 16 cmH2O (05/26/18 2025)  Plateau Pressure: 0 cmH20 (05/26/18 2025)  Total Ve: 11.8 mL (05/26/18 2025)  F/VT Ratio<105 (RSBI): (!) 32.76 (05/26/18 2025)    Lines/Drains/Airways     Peripherally Inserted Central Catheter Line                 PICC Double Lumen 03/05/18 1539 right basilic 83 days          Central Venous Catheter Line                 Percutaneous  Central Line Insertion/Assessment - Quad lumen  05/25/18 0800 left subclavian 2 days          Drain                 Chest Tube 05/25/18 1515 Other (Comment) Pleural 2 days         Y Chest Tube 3 and 4 05/25/18 1354 3 Right Pleural 32 Fr. 4 Left Pericardial 32 Fr. 2 days         Urethral Catheter 05/28/18 0245 less than 1 day          Arterial Line                 Arterial Line 05/25/18 0745 Left Brachial 2 days          Line                 Pacer Wires 05/25/18 1355 2 days          Peripheral Intravenous Line                 Peripheral IV - Single Lumen 05/23/18 1200 Left Forearm 4 days         Peripheral IV - Single Lumen 05/23/18 1700 Left Wrist 4 days         Peripheral IV - Single Lumen 05/27/18 1135 Right Forearm less than 1 day                Significant Labs:    CBC/Anemia Profile:    Recent Labs  Lab 05/27/18  0155 05/27/18  1534 05/28/18  0303   WBC 6.99  --  6.65   HGB 7.9*  --  8.2*   HCT 24.0*  --  25.0*   PLT 55* 54* 71*   MCV 93  --  94   RDW 15.7*  --  15.9*        Chemistries:    Recent Labs  Lab 05/26/18  1915  05/27/18  0155  05/27/18  1734 05/27/18  2302 05/28/18  0303     --  145  --   --   --  149*   K 4.2  < > 4.3  < > 4.1 4.2 4.1  4.1   *  --  111*  --   --   --  110   CO2 21*  --  24  --   --   --  25   BUN 41*  --  47*  --   --   --  66*   CREATININE 1.9*  --  2.0*  --   --   --  2.3*   CALCIUM 8.3*  --  8.8  --   --   --  9.2   MG  --   < > 2.5  < > 2.4 2.6 2.6   PHOS  --   --  6.1*  --   --   --   --    < > = values in this interval not displayed.    All pertinent labs within the past 24 hours have been reviewed.    Significant Imaging:  I have reviewed all pertinent imaging results/findings within the past 24 hours.

## 2018-05-28 NOTE — PT/OT/SLP PROGRESS
Physical Therapy      Patient Name:  Rodolfo Messina   MRN:  [id number]    Patient not seen today secondary to Unavailable (Comment).  Pt hypotensive after sit>stand transfer with RN for BM. Pt Will follow-up as appropriate.    Noreen Raymundo, PT   5/28/2018

## 2018-05-28 NOTE — PLAN OF CARE
05/28/18 1415   Discharge Reassessment   Assessment Type Discharge Planning Reassessment   Provided patient/caregiver education on the expected discharge date and the discharge plan Yes   Do you have any problems affording any of your prescribed medications? No   Discharge Plan A Home with family   Discharge Plan B Home with family;Home Health   Can the patient answer the patient profile reliably? Yes, cognitively intact   Describe the patient's ability to walk at the present time. Minor restrictions or changes   During the past month, has the patient often been bothered by feeling down, depressed or hopeless? No   During the past month, has the patient often been bothered by little interest or pleasure in doing things? No

## 2018-05-28 NOTE — PROGRESS NOTES
"[facility]  Endocrinology  Progress Note    Admit Date: 5/23/2018     Reason for Consult: Management of  Hyperglycemia     Surgical Procedure and Date: s/p CABG x 1, MVR  5/25/18    HPI: Patient is a 67 y.o. male with a diagnosis of mitral valve endocarditis and resultant severe MR, TR and pulmonary HTN. The etiology of his endocarditis remains unknown. He was admitted to the SICU pre-operatively for IABP placement 5/23/18. Underwent cardiac surgery 5/25/18.   No personal or family hx of DM  Lab Results   Component Value Date    HGBA1C 5.4 02/22/2018     Endocrine consulted for BG mgmt.             Interval HPI:   Overnight events: remains in ICU on pressors. Restarted on cardene overnight. BG remains at goal without insulin.  Eating:   NPO, repeat swallow study indicated per speech  Nausea: No  Hypoglycemia and intervention: No  Fever: No  TPN and/or TF: No    BP (!) 127/59 (BP Location: Right arm, Patient Position: Lying)   Pulse 80   Temp 97.9 °F (36.6 °C) (Oral)   Resp (!) 22   Ht 5' 10" (1.778 m)   Wt 69.9 kg (154 lb 1.6 oz)   SpO2 96%   BMI 22.11 kg/m²       Labs Reviewed and Include      Recent Labs  Lab 05/28/18  0303      CALCIUM 9.2   *   K 4.1  4.1   CO2 25      BUN 66*   CREATININE 2.3*     Lab Results   Component Value Date    WBC 6.65 05/28/2018    HGB 8.2 (L) 05/28/2018    HCT 25.0 (L) 05/28/2018    MCV 94 05/28/2018    PLT 71 (L) 05/28/2018     No results for input(s): TSH, FREET4 in the last 168 hours.  Lab Results   Component Value Date    HGBA1C 5.4 02/22/2018       Nutritional status:   Body mass index is 22.11 kg/m².  Lab Results   Component Value Date    ALBUMIN 3.4 (L) 05/25/2018    ALBUMIN 3.7 05/25/2018    ALBUMIN 3.3 (L) 05/24/2018     Lab Results   Component Value Date    PREALBUMIN 12 (L) 02/22/2018       Estimated Creatinine Clearance: 30.8 mL/min (A) (based on SCr of 2.3 mg/dL (H)).    Accu-Checks  Recent Labs      05/27/18   0309  " 05/27/18   0438  05/27/18   0557  05/27/18   0712  05/27/18   0821  05/27/18   1131  05/27/18   1542  05/27/18   1737  05/27/18   2301  05/28/18   0609   POCTGLUCOSE  135*  129*  134*  123*  129*  118*  117*  93  99  101       Pressors:    Autonomic Drugs     Start     Stop Route Frequency Ordered    05/25/18 1600  norepinephrine 4 mg in dextrose 5% 250 mL infusion (premix) (titrating)     Question Answer Comment   Titrate by: (in mcg/kg/min) 0.05    Titrate interval: (in minutes) 5    Titrate to maintain: (MAP or SBP) MAP    Greater than: (in mmHg) 60    Maximum dose: (in mcg/kg/min) 3        -- IV Continuous 05/25/18 1511        Hyperglycemia/Diabetes Medications: Antihyperglycemics     Start     Stop Route Frequency Ordered    05/27/18 1054  insulin aspart U-100 pen 1-10 Units      -- SubQ Every 6 hours PRN 05/27/18 0954          ASSESSMENT and PLAN    * S/P MVR (mitral valve replacement)    Per CTS  avoid hypoglycemia          Acute hyperglycemia    BG goal 110-140     Discontinue bg monitoring and correction scale coverage.   BG at goal without insulin; no history of DM.   Will sign off.       Please page endocrine NP on call with any BG related issues or concerns. Thanks.       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

## 2018-05-28 NOTE — PT/OT/SLP PROGRESS
"Speech Language Pathology Treatment    Patient Name:  Rodolfo Messina   MRN:  [id number]  Admitting Diagnosis: S/P MVR (mitral valve replacement)    Recommendations:                 General Recommendations:  Dysphagia therapy and Modified barium swallow study  Diet recommendations:  NPO, Liquid Diet Level: NPO   Aspiration Precautions: Strict aspiration precautions   General Precautions: Standard, aspiration, NPO  Communication strategies:  none    Subjective     "I want to eat" (Pt stated)    Pain/Comfort:  · Pain Rating 1: 0/10  · Pain Rating Post-Intervention 1: 0/10    Objective:     Has the patient been evaluated by SLP for swallowing?   Yes  Keep patient NPO? Yes   Current Respiratory Status: nasal cannula      Pt seen this am with wife present.  Mr. Messina was awake/alert and cooperative with wife at bedside.  Persisting hallucination reported, which wife contributed to pain meds, however, pt with good participation in ST session.   He was presented with ice chips x2 and tsp water x3 for ongoing swallow assessment, prior to nursing halting session due to possible need for cardioversion today.  Results of limited observed trials, revealed throat clearing and one cough with thin liquid.  ST unable to trial thickened liquid/puree/solid as result of NPO status for possible procedure.  Feel pt would benefit from MBSS to further assess swallow safety prior to initiation of po diet.  Spoke with MD regarding recommendations. MD in favor and will place order for MBSS for tomorrow,  Pending MD team agreement and if  cardioversion can be done today.   Education provided to pt/wife throughout session.  Reviewed risk for aspiration, s/s of aspiration, and continued poc.  Pt/wife verbalized understanding.   Results / recs also reviewed with nursing post session.    Assessment:     Rodolfo Messina is a 67 y.o. male with an SLP diagnosis of Dysphagia.     Goals:    SLP Goals        Problem: SLP Goal    Goal Priority " Disciplines Outcome   SLP Goal     SLP    Description:  Speech Language Pathology Goals  Goals expected to be met by 6/4:  1. Pt will participate in ongoing swallowing assessment to determine safest and least restrictive diet.                         Plan:     · Patient to be seen:  5 x/week   · Plan of Care expires:  06/25/18  · Plan of Care reviewed with:  patient, spouse   · SLP Follow-Up:  Yes       Discharge recommendations:   (tbd)     Time Tracking:     SLP Treatment Date:   05/28/18  Speech Start Time:  1220  Speech Stop Time:  1240     Speech Total Time (min):  20 min    Billable Minutes: Treatment Swallowing Dysfunction 20    IRVIN Wynn, CCC-SLP  Speech Language Pathologist  [phone]  5/28/2018

## 2018-05-28 NOTE — ANESTHESIA PREPROCEDURE EVALUATION
[facility]  Anesthesia Pre-Operative Evaluation         Patient Name: Rodolfo Messina  YOB: 1950  MRN: [id number]    SUBJECTIVE:     Pre-operative evaluation for Procedure(s) (LRB):  CARDIOVERSION (N/A)     05/28/2018    Rodolfo Messina is a 67 y.o. male w/ a significant PMHx of mitral valve endocarditis resulting in severe MR, TR and pulmonary HTN s/p MVR, TV repair and CABG on 5/25. Patient was bradycardic post-op with VVI pacing at 80bpm.     Patient now presents for the above procedure(s).      LDA:        PICC Double Lumen 03/05/18 1539 right basilic (Active)   Number of days: 83            Percutaneous Central Line Insertion/Assessment - Quad lumen  05/25/18 0800 left subclavian (Active)   Number of days: 3            Peripheral IV - Single Lumen 05/23/18 1200 Left Forearm (Active)   Site Assessment Clean;Dry 5/28/2018 11:00 AM   Line Status Infusing 5/28/2018 11:00 AM   Dressing Status Clean;Dry;Intact 5/28/2018 11:00 AM   Dressing Intervention Dressing reinforced 5/28/2018 11:00 AM   Dressing Change Due 05/27/18 5/28/2018  3:00 AM   Site Change Due 05/27/18 5/27/2018 11:00 PM   Reason Not Rotated Poor venous access 5/28/2018 11:00 AM   Number of days: 5            Peripheral IV - Single Lumen 05/23/18 1700 Left Wrist (Active)   Site Assessment Clean;Dry;Intact 5/28/2018 11:00 AM   Line Status Infusing 5/28/2018 11:00 AM   Dressing Status Clean;Dry;Intact 5/28/2018 11:00 AM   Dressing Intervention Dressing reinforced 5/28/2018 11:00 AM   Dressing Change Due 05/27/18 5/28/2018  3:00 AM   Site Change Due 05/27/18 5/27/2018 11:00 PM   Reason Not Rotated Poor venous access 5/28/2018 11:00 AM   Number of days: 4            Peripheral IV - Single Lumen 05/27/18 1135 Right Forearm (Active)   Site Assessment Dry;Clean;Intact 5/28/2018 11:00 AM   Line Status Infusing 5/28/2018 11:00 AM   Dressing Status Clean;Dry;Intact 5/28/2018 11:00 AM   Dressing Intervention Dressing reinforced  5/28/2018 11:00 AM   Dressing Change Due 05/31/18 5/28/2018  3:00 AM   Site Change Due 05/31/18 5/27/2018 11:00 PM   Reason Not Rotated Not due 5/28/2018 11:00 AM   Number of days: 1            Arterial Line 05/25/18 0745 Left Brachial (Active)   Site Assessment Clean;Dry;Intact 5/28/2018 11:00 AM   Line Status Pulsatile blood flow 5/28/2018 11:00 AM   Art Line Waveform Appropriate 5/28/2018 11:00 AM   Arterial Line Interventions Zeroed and calibrated;Leveled 5/28/2018 11:00 AM   Color/Movement/Sensation Capillary refill less than 3 sec 5/28/2018 11:00 AM   Dressing Type Transparent 5/28/2018 11:00 AM   Dressing Status Clean;Dry;Intact 5/28/2018 11:00 AM   Dressing Intervention Dressing reinforced 5/28/2018 11:00 AM   Dressing Change Due 05/29/18 5/28/2018  3:00 AM   Number of days: 3            Pacer Wires 05/25/18 1355 (Active)   Pacer Wire Status Ventricular wires connected to pacer 5/28/2018 11:00 AM   Site Assessment Other (Comment) 5/28/2018 11:00 AM   How Pacer Wires are Secured Ventricular wires secured to dressing 5/28/2018 11:00 AM   Dressing Status Clean;Dry;Intact 5/28/2018 11:00 AM   Dressing Intervention Dressing reinforced 5/28/2018 11:00 AM   Interventions MD notified 5/28/2018  4:20 AM   Number of days: 3            Chest Tube 05/25/18 1515 Other (Comment) Pleural (Active)   Chest Tube WDL ex 5/28/2018 11:00 AM   Function -20 cm H2O 5/28/2018 11:00 AM   Air Leak/Fluctuation air leak not present 5/28/2018 11:00 AM   Safety all tubing connections taped 5/28/2018 11:00 AM   Securement tubing anchored to body distal to insertion site w/ tape 5/28/2018 11:00 AM   Drainage Description Serosanguineous 5/28/2018 11:00 AM   Dressing Appearance occlusive gauze dressing intact 5/28/2018 11:00 AM   Dressing Care dressing reinforced 5/28/2018 11:00 AM   Left Subcutaneous Emphysema none present 5/28/2018 11:00 AM   Right Subcutaneous Emphysema none present 5/28/2018 11:00 AM   Site Assessment Clean;Dry;Intact  "5/28/2018 11:00 AM   Surrounding Skin Unable to view 5/28/2018 11:00 AM   Output (mL) 50 mL 5/28/2018  6:00 AM   Number of days: 2            Urethral Catheter 05/28/18 0245 (Active)   Site Assessment Clean;Intact 5/28/2018 11:00 AM   Collection Container Standard drainage bag 5/28/2018 11:00 AM   Securement Method secured to top of thigh w/ adhesive device 5/28/2018 11:00 AM   Catheter Care Performed yes 5/28/2018 11:00 AM   Reason for Continuing Urinary Catheterization Urinary retention 5/28/2018 11:00 AM   CAUTI Prevention Bundle StatLock in place w 1" slack;Intact seal between catheter & drainage tubing;Drainage bag off the floor;Green sheeting clip in use;Drainage bag not overfilled (<2/3 full);Drainage bag below bladder;No dependent loops or kinks 5/28/2018 11:00 AM   Output (mL) 80 mL 5/28/2018  1:00 PM   Number of days: 0            Y Chest Tube 3 and 4 05/25/18 1354 3 Right Pleural 32 Fr. 4 Left Pericardial 32 Fr. (Active)   Function -20 cm H2O 5/28/2018 11:00 AM   Air Leak/Fluctuation air leak not present 5/28/2018 11:00 AM   Safety all tubing connections taped 5/28/2018 11:00 AM   Securement tubing anchored to body distal to insertion site w/ tape 5/28/2018 11:00 AM   Left Subcutaneous Emphysema none present 5/28/2018 11:00 AM   Right Subcutaneous Emphysema none present 5/28/2018 11:00 AM   Patency Intervention Tip/tilt 5/28/2018 11:00 AM   Drainage Description 3 Serosanguineous 5/28/2018 11:00 AM   Tube 3 Dressing Appearance occlusive gauze dressing intact 5/28/2018 11:00 AM   Tube 3 Dressing Care dressing reinforced 5/28/2018 11:00 AM   Site Assessment 3 Clean;Dry;Intact 5/28/2018 11:00 AM   Surrounding Skin 3 Dry;Intact 5/28/2018 11:00 AM   Drainage Description 4 Serosanguineous 5/28/2018 11:00 AM   Tube 4 Dressing Appearance occlusive gauze dressing intact 5/28/2018 11:00 AM   Tube 4 Dressing Care dressing reinforced 5/28/2018 11:00 AM   Site Assessment 4 Dry;Intact 5/28/2018 11:00 AM   Surrounding " Skin 4 Dry;Intact 5/28/2018 11:00 AM   Output (mL) 50 mL 5/27/2018 11:00 PM   Number of days: 3       Prev airway:   Placement Date: 05/25/18; Placement Time: 0739; Method of Intubation: Direct laryngoscopy; Inserted by: Anesthesia Resident; Airway Device: Endotracheal Tube; Mask Ventilation: Easy - oral; Intubated: Postinduction; Blade: Olguin #2; Airway Device Size: 7.5; Style: Cuffed; Cuff Inflation: Minimal occlusive pressure; Inflation Amount: 8; Placement Verified By: Auscultation, Capnometry, ETT Condensation; Grade: Grade II; Complicating Factors: Anterior larynx;    Drips:    furosemide (LASIX) 2 mg/mL infusion (non-titrating) 7.5 mg/hr (05/28/18 1300)    nicardipine Stopped (05/28/18 0900)    norepinephrine bitartrate-D5W Stopped (05/25/18 1510)    propofol Stopped (05/26/18 1910)       Patient Active Problem List   Diagnosis    Tremor    HTN (hypertension)    Anxiety    Symptomatic anemia    Pulmonary nodules    Pulmonary hypertension due to mitral valve disease    Coronary artery disease of native artery of native heart with stable angina pectoris    Chronic systolic congestive heart failure    S/P CABG x 1    S/P MVR (mitral valve replacement)    Acute hyperglycemia    Junctional bradycardia    Complete heart block       Review of patient's allergies indicates:   Allergen Reactions    Shellfish containing products     Augmentin [amoxicillin-pot clavulanate]      Patient felt that it raised BP and requested to have it added as intolerance. Tolerated unasyn    Ciprofloxacin     Flagyl [metronidazole]     Lisinopril Other (See Comments)     cough    Mysoline [primidone]     Omnicef [cefdinir]        Current Inpatient Medications:   acetaminophen  1,000 mg Intravenous Q6H    amLODIPine  5 mg Oral Daily    aspirin  300 mg Rectal Daily    atorvastatin  40 mg Oral Daily    doxazosin  4 mg Oral Daily    famotidine  20 mg Oral Daily    mupirocin  1 g Nasal BID    polyethylene  glycol  17 g Oral Daily    potassium chloride in water  20 mEq Intravenous Once    sodium chloride 0.9%  3 mL Intravenous Q8H    warfarin  3 mg Oral Daily       No current facility-administered medications on file prior to encounter.      Current Outpatient Prescriptions on File Prior to Encounter   Medication Sig Dispense Refill    albuterol-ipratropium 2.5mg-0.5mg/3mL (DUO-NEB) 0.5 mg-3 mg(2.5 mg base)/3 mL nebulizer solution 3 mLs every 4 (four) hours as needed.       aspirin (ECOTRIN) 81 MG EC tablet Take 1 tablet (81 mg total) by mouth once daily.  0    atorvastatin (LIPITOR) 20 MG tablet Take 2 tablets (40 mg total) by mouth once daily. 180 tablet 3    benzonatate (TESSALON) 100 MG capsule Take 100 mg by mouth 3 (three) times daily as needed for Cough.      furosemide (LASIX) 40 MG tablet Take 1 tablet (40 mg total) by mouth once daily. 30 tablet 1    loratadine (CLARITIN) 10 mg tablet Take 10 mg by mouth once daily.      mirtazapine (REMERON) 30 MG tablet Take 1 tablet (30 mg total) by mouth every evening. 90 tablet 3    pantoprazole (PROTONIX) 40 MG tablet Take 1 tablet (40 mg total) by mouth once daily. 30 tablet 5    tamsulosin (FLOMAX) 0.4 mg Cp24 Take 2 capsules (0.8 mg total) by mouth 2 (two) times daily. 120 capsule 11    VENTOLIN HFA 90 mcg/actuation inhaler          Past Surgical History:   Procedure Laterality Date    APPENDECTOMY      COLONOSCOPY      COLONOSCOPY N/A 2/1/2018    Procedure: COLONOSCOPY;  Surgeon: Richar Payan MD;  [facility] ([facility]  Service: Endoscopy;  Laterality: N/A;  PM prep    CREATION OF AORTOCORONARY ARTERY BYPASS N/A 5/25/2018    Procedure: AORTOCORONARY BYPASS-CABG;  Surgeon: Marvin Go MD;  [facility];  Service: Cardiovascular;  Laterality: N/A;    EYE SURGERY Right     cataract extraction    FINGER SURGERY      FRACTURE SURGERY Right     index finger    MITRAL VALVE REPLACEMENT N/A 5/25/2018    Procedure: Mitral  Valve Replacement;  Surgeon: Marvin Go MD;  Location: [facility] OR [address] [place] [address];  Service: Cardiovascular;  Laterality: N/A;    TONSILLECTOMY         Social History     Social History    Marital status:      Spouse name: N/A    Number of children: N/A    Years of education: N/A     Occupational History     [employer]     Social History Main Topics    Smoking status: Never Smoker    Smokeless tobacco: Never Used    Alcohol use No      Comment: none recently    Drug use: No    Sexual activity: Not Currently     Partners: Female     Other Topics Concern    Not on file     Social History Narrative    No narrative on file       OBJECTIVE:     Vital Signs Range (Last 24H):  Temp:  [36.4 °C (97.5 °F)-37 °C (98.6 °F)]   Pulse:  [54-83]   Resp:  [0-29]   BP: (105-154)/(51-66)   SpO2:  [92 %-99 %]   Arterial Line BP: ()/(43-98)       CBC:   Recent Labs      05/27/18   0155  05/27/18   1534  05/28/18   0303   WBC  6.99   --   6.65   RBC  2.57*   --   2.66*   HGB  7.9*   --   8.2*   HCT  24.0*   --   25.0*   PLT  55*  54*  71*   MCV  93   --   94   MCH  30.7   --   30.8   MCHC  32.9   --   32.8       CMP:   Recent Labs      05/25/18   2117   05/27/18   0155   05/28/18   0303  05/28/18   1208   NA  145   < >  145   --   149*   --    K  3.7   < >  4.3   < >  4.1  4.1  3.9   CL  112*   < >  111*   --   110   --    CO2  21*   < >  24   --   25   --    BUN  31*   < >  47*   --   66*   --    CREATININE  1.6*   < >  2.0*   --   2.3*   --    GLU  133*   < >  139*   --   105   --    MG  2.8*   < >  2.5   < >  2.6  2.7*   PHOS  2.5*   --   6.1*   --    --    --    CALCIUM  8.8   < >  8.8   --   9.2   --    ALBUMIN  3.4*   --    --    --    --    --    PROT  5.5*   --    --    --    --    --    ALKPHOS  51*   --    --    --    --    --    ALT  15   --    --    --    --    --    AST  77*   --    --    --    --    --    BILITOT  1.5*   --    --    --    --    --     < > = values in this interval not displayed.        INR:  Recent Labs      18   0330  18   0155  18   0638   INR  1.1  1.3*  1.4*   APTT  28.5  32.5*  35.7*       Diagnostic Studies: No relevant studies.    EK2018  Junctional rhythm with occasional Premature ventricular complexes  Minimal voltage criteria for LVH, may be normal variant  Abnormal ECG  When compared with ECG of 26-MAY-2018 09:37,  Junctional rhythm has replaced Ectopic atrial rhythm  QT has shortened    2D ECHO:  Results for orders placed or performed during the hospital encounter of 18   2D Echo w/ Color Flow Doppler   Result Value Ref Range    EF 60 55 - 65    Mitral Valve Regurgitation SEVERE (A)     Aortic Valve Regurgitation MILD     Est. PA Systolic Pressure 85.44 (A)     Mitral Valve Mobility NORMAL          ASSESSMENT/PLAN:         Anesthesia Evaluation    I have reviewed the Patient Summary Reports.    I have reviewed the Nursing Notes.   I have reviewed the Medications.     Review of Systems  Anesthesia Hx:  No problems with previous Anesthesia Denies Hx of Anesthetic complications  History of prior surgery of interest to airway management or planning: heart surgery. Previous anesthesia: General Denies Family Hx of Anesthesia complications.   Denies Personal Hx of Anesthesia complications.   Social:  Non-Smoker, No Alcohol Use    Hematology/Oncology:     Oncology Normal    -- Anemia:   EENT/Dental:EENT/Dental Normal   Cardiovascular:   Exercise tolerance: poor Hypertension Valvular problems/Murmurs (s/p MVR and TVr) CAD  CABG/stent Dysrhythmias  Angina CHF HAQ ECG has been reviewed. mitral valve endocarditis    Pulmonary:   Shortness of breath    Renal/:   Chronic Renal Disease, ARF    Hepatic/GI:  Hepatic/GI Normal    Musculoskeletal:  Musculoskeletal Normal    Neurological:  Neurology Normal    Endocrine:  Endocrine Normal    Dermatological:  Skin Normal    Psych:   Psychiatric History anxiety          Physical Exam  General:  Well nourished     Airway/Jaw/Neck:  Airway Findings: Mouth Opening: Normal Tongue: Normal  General Airway Assessment: Adult  Mallampati: III  Improves to II with phonation.  TM Distance: Normal, at least 6 cm  Jaw/Neck Findings:  Neck ROM: Normal ROM  Neck Findings: Normal    Eyes/Ears/Nose:  EYES/EARS/NOSE FINDINGS: Normal   Dental:  Dental Findings: In tact, Periodontal disease, Mild   Chest/Lungs:  Chest/Lungs Findings: (sternotomy dressing) Clear to auscultation, Normal Respiratory Rate     Heart/Vascular:  Heart Findings: (paced) Rate: Normal  Rhythm: Regular Rhythm  Heart Murmur  Systolic     Abdomen:  Abdomen Findings: Normal    Musculoskeletal:  Musculoskeletal Findings: Normal   Skin:  Skin Findings: Normal    Mental Status:  Mental Status Findings: (alert, confused)  Cooperative         Anesthesia Plan  Type of Anesthesia, risks & benefits discussed:  Anesthesia Type:  MAC, general  Patient's Preference:   Intra-op Monitoring Plan: standard ASA monitors  Intra-op Monitoring Plan Comments:   Post Op Pain Control Plan: per primary service following discharge from PACU  Post Op Pain Control Plan Comments:   Induction:   IV  Beta Blocker:  Patient is not currently on a Beta-Blocker (No further documentation required).       Informed Consent: Patient representative understands risks and agrees with Anesthesia plan.  Questions answered. Anesthesia consent signed with patient representative.  ASA Score: 3     Day of Surgery Review of History & Physical:    H&P update referred to the provider.         Ready For Surgery From Anesthesia Perspective.

## 2018-05-28 NOTE — PROGRESS NOTES
[facility]  Critical Care - Surgery  Progress Note    Patient Name: Rodolfo Messina  MRN: [id number]  Admission Date: 5/23/2018  Hospital Length of Stay: 5 days  Code Status: Full Code  Attending Provider: Marvin Go MD  Primary Care Provider: Deric Claudio MD   Principal Problem: S/P MVR (mitral valve replacement)    Subjective:     Hospital/ICU Course:  No notes on file    Interval History/Significant Events: Patient back on cardene overnight. Confused this am.    Follow-up For: Procedure(s) (LRB):  Mitral Valve Replacement (N/A)  AORTOCORONARY BYPASS-CABG (N/A)    Post-Operative Day: 3 Days Post-Op    Objective:     Vital Signs (Most Recent):  Temp: 97.6 °F (36.4 °C) (05/28/18 0300)  Pulse: 80 (05/28/18 0600)  Resp: 11 (05/28/18 0600)  BP: (!) 117/59 (05/28/18 0600)  SpO2: 97 % (05/28/18 0600) Vital Signs (24h Range):  Temp:  [97.5 °F (36.4 °C)-98.6 °F (37 °C)] 97.6 °F (36.4 °C)  Pulse:  [54-90] 80  Resp:  [0-29] 11  SpO2:  [86 %-99 %] 97 %  BP: (117-154)/(56-70) 117/59  Arterial Line BP: (105-163)/(38-97) 122/50     Weight: 69.9 kg (154 lb 1.6 oz)  Body mass index is 22.11 kg/m².      Intake/Output Summary (Last 24 hours) at 05/28/18 0630  Last data filed at 05/28/18 0600   Gross per 24 hour   Intake           494.85 ml   Output             4795 ml   Net         -4300.15 ml       Physical Exam   Constitutional: He appears well-developed.   HENT:   Head: Normocephalic and atraumatic.   Eyes: EOM are normal. Pupils are equal, round, and reactive to light.   Cardiovascular: Regular rhythm.    Paced at 80   Pulmonary/Chest:   Extubated, on nasal canula    Neurological:   Confused, only oriented to person   Skin: Skin is warm and dry.       Vents:  Vent Mode: Spont (05/26/18 2025)  Ventilator Initiated: Yes (05/25/18 1509)  Set Rate: 0 bmp (05/26/18 2025)  Vt Set: 500 mL (05/26/18 2025)  Pressure Support: 10 cmH20 (05/26/18 2025)  PEEP/CPAP: 5 cmH20 (05/26/18 2025)  Oxygen Concentration  (%): 5 (05/28/18 0600)  Peak Airway Pressure: 16 cmH2O (05/26/18 2025)  Plateau Pressure: 0 cmH20 (05/26/18 2025)  Total Ve: 11.8 mL (05/26/18 2025)  F/VT Ratio<105 (RSBI): (!) 32.76 (05/26/18 2025)    Lines/Drains/Airways     Peripherally Inserted Central Catheter Line                 PICC Double Lumen 03/05/18 1539 right basilic 83 days          Central Venous Catheter Line                 Percutaneous Central Line Insertion/Assessment - Quad lumen  05/25/18 0800 left subclavian 2 days          Drain                 Chest Tube 05/25/18 1515 Other (Comment) Pleural 2 days         Y Chest Tube 3 and 4 05/25/18 1354 3 Right Pleural 32 Fr. 4 Left Pericardial 32 Fr. 2 days         Urethral Catheter 05/28/18 0245 less than 1 day          Arterial Line                 Arterial Line 05/25/18 0745 Left Brachial 2 days          Line                 Pacer Wires 05/25/18 1355 2 days          Peripheral Intravenous Line                 Peripheral IV - Single Lumen 05/23/18 1200 Left Forearm 4 days         Peripheral IV - Single Lumen 05/23/18 1700 Left Wrist 4 days         Peripheral IV - Single Lumen 05/27/18 1135 Right Forearm less than 1 day                Significant Labs:    CBC/Anemia Profile:    Recent Labs  Lab 05/27/18  0155 05/27/18  1534 05/28/18  0303   WBC 6.99  --  6.65   HGB 7.9*  --  8.2*   HCT 24.0*  --  25.0*   PLT 55* 54* 71*   MCV 93  --  94   RDW 15.7*  --  15.9*        Chemistries:    Recent Labs  Lab 05/26/18  1915  05/27/18  0155  05/27/18  1734 05/27/18  2302 05/28/18  0303     --  145  --   --   --  149*   K 4.2  < > 4.3  < > 4.1 4.2 4.1  4.1   *  --  111*  --   --   --  110   CO2 21*  --  24  --   --   --  25   BUN 41*  --  47*  --   --   --  66*   CREATININE 1.9*  --  2.0*  --   --   --  2.3*   CALCIUM 8.3*  --  8.8  --   --   --  9.2   MG  --   < > 2.5  < > 2.4 2.6 2.6   PHOS  --   --  6.1*  --   --   --   --    < > = values in this interval not displayed.    All pertinent labs within  the past 24 hours have been reviewed.    Significant Imaging:  I have reviewed all pertinent imaging results/findings within the past 24 hours.    Assessment/Plan:     Chronic systolic congestive heart failure     66yo M with PMH of mitral valve endocarditis and resultant severe MR, TR and pulmonary HTN who is now s/p IABP placement (5/24) and s/pMVR, TV repair, AVR and CABG 5/25.     Plan:     Neuro:   -Confused and only oriented to self, likely ICU delerium     Delirium precautions   -Pain control per CTS     Pulmonary:   -Extubated, saturating well on nasal canula, wean as tolerated  -ABGs prn  ABG    Recent Labs  Lab 05/27/18  0822   PH 7.478*   PO2 75*   PCO2 32.1*   HCO3 23.8*   BE 0     -Continuous pulse ox     Cardiac:  -MAP goal >65  -Paced at 80  -Cardene gtt restarted overnight and one dose of hydralazine given, wean today  -Continuous cardiac monitoring     Renal:   -UOP adequate 4.5L yesterday on lasix drip  -Flomax daily given home use  -Bun/Cr 66/2.3 (baseline Cr 1.4-1.6), continues to increase     Fluids/Electrolytes/Nutrition/GI:   -Nutritional status: NPO except meds  -replace lytes PRN  -Bowel regimen     Hematology/Oncology:  -H/H 8.2/25, continue to monitor  -INR/Plts 1.3/55  -CBC daily     Infectious Disease:   -Afebrile  -WBC 6.99  -CBC daily   -Abx: ancef x 5 doses, completed     Endocrine:  - No h/o DM or thyroid dysfunction  -Not currently requiring insulin gtt for glucose control  - Glucose goal of 120-180     Dispo:  -Step down per CTS  -CTS primary                       Critical care was time spent personally by me on the following activities: development of treatment plan with patient or surrogate and bedside caregivers, discussions with consultants, evaluation of patient's response to treatment, examination of patient, ordering and performing treatments and interventions, ordering and review of laboratory studies, ordering and review of radiographic studies, pulse oximetry,  re-evaluation of patient's condition.  This critical care time did not overlap with that of any other provider or involve time for any procedures.     Ludmila Santana MD  Critical Care - Surgery  [facility]

## 2018-05-28 NOTE — ASSESSMENT & PLAN NOTE
68 y/o M with newly developed CHB with junctional escape with rates in the 40's, atrial rhythm is new AF. POD# 3 s/p bioprostethetic mitral valve replacement and CABG x1, and recent administration of beta blockers (yesterday).     -Hold AV amelia blockers   -Keep epicardial pacer in place (currently threshold is 2mA)  -Repeat TTE to evaluate for mitral valve abscess   -Will elect to cardiovert today afternoon after the echo  -Keep NPO  -Agree with coumadin   -If his CHB persists then will plan for permanent pacemaker

## 2018-05-28 NOTE — ASSESSMENT & PLAN NOTE
68 y/o M with newly developed junctional bradycardia, POD# 3 s/p bioprostethetic mitral valve replacement and CABG x1, and recent administration of beta blockers (yesterday). Bradycardia can be 2/2 inflammation secondary to the MVR, and exacerbated by the beta blocker.

## 2018-05-28 NOTE — ASSESSMENT & PLAN NOTE
66yo M with PMH of mitral valve endocarditis and resultant severe MR, TR and pulmonary HTN who is now s/p IABP placement (5/24) and s/pMVR, TV repair, AVR and CABG 5/25.     Plan:     Neuro:   -Confused and only oriented to self, likely ICU delerium     Delirium precautions   -Pain control per CTS     Pulmonary:   -Extubated, saturating well on nasal canula, wean as tolerated  -ABGs prn  ABG    Recent Labs  Lab 05/27/18  0822   PH 7.478*   PO2 75*   PCO2 32.1*   HCO3 23.8*   BE 0     -Continuous pulse ox     Cardiac:  -MAP goal >65  -Paced at 80  -Cardene gtt restarted overnight and one dose of hydralazine given, wean today  -Continuous cardiac monitoring     Renal:   -UOP adequate 4.5L yesterday on lasix drip  -Flomax daily given home use  -Bun/Cr 66/2.3 (baseline Cr 1.4-1.6), continues to increase     Fluids/Electrolytes/Nutrition/GI:   -Nutritional status: NPO except meds  -replace lytes PRN  -Bowel regimen     Hematology/Oncology:  -H/H 8.2/25, continue to monitor  -INR/Plts 1.3/55  -CBC daily     Infectious Disease:   -Afebrile  -WBC 6.99  -CBC daily   -Abx: ancef x 5 doses, completed     Endocrine:  - No h/o DM or thyroid dysfunction  -Not currently requiring insulin gtt for glucose control  - Glucose goal of 120-180     Dispo:  -Step down per CTS  -CTS primary

## 2018-05-29 PROBLEM — I48.92 ATRIAL FLUTTER: Status: ACTIVE | Noted: 2018-05-29

## 2018-05-29 PROBLEM — R04.0 EPISTAXIS: Status: ACTIVE | Noted: 2018-05-29

## 2018-05-29 LAB
ANION GAP SERPL CALC-SCNC: 15 MMOL/L
ANISOCYTOSIS BLD QL SMEAR: SLIGHT
APTT BLDCRRT: 37.6 SEC
BASOPHILS # BLD AUTO: 0.01 K/UL
BASOPHILS NFR BLD: 0.2 %
BUN SERPL-MCNC: 89 MG/DL
CALCIUM SERPL-MCNC: 9.2 MG/DL
CHLORIDE SERPL-SCNC: 111 MMOL/L
CO2 SERPL-SCNC: 25 MMOL/L
CREAT SERPL-MCNC: 2.8 MG/DL
DIFFERENTIAL METHOD: ABNORMAL
EOSINOPHIL # BLD AUTO: 0.1 K/UL
EOSINOPHIL NFR BLD: 2.3 %
ERYTHROCYTE [DISTWIDTH] IN BLOOD BY AUTOMATED COUNT: 15.9 %
EST. GFR  (AFRICAN AMERICAN): 25.8 ML/MIN/1.73 M^2
EST. GFR  (NON AFRICAN AMERICAN): 22.3 ML/MIN/1.73 M^2
GLUCOSE SERPL-MCNC: 96 MG/DL
HCT VFR BLD AUTO: 23.8 %
HGB BLD-MCNC: 7.8 G/DL
HYPOCHROMIA BLD QL SMEAR: ABNORMAL
IMM GRANULOCYTES # BLD AUTO: 0.02 K/UL
IMM GRANULOCYTES NFR BLD AUTO: 0.3 %
INR PPP: 1.6
LYMPHOCYTES # BLD AUTO: 0.3 K/UL
LYMPHOCYTES NFR BLD: 4.3 %
MAGNESIUM SERPL-MCNC: 2.9 MG/DL
MAGNESIUM SERPL-MCNC: 2.9 MG/DL
MAGNESIUM SERPL-MCNC: 3.1 MG/DL
MAGNESIUM SERPL-MCNC: 3.1 MG/DL
MCH RBC QN AUTO: 30.7 PG
MCHC RBC AUTO-ENTMCNC: 32.8 G/DL
MCV RBC AUTO: 94 FL
MONOCYTES # BLD AUTO: 0.4 K/UL
MONOCYTES NFR BLD: 6.1 %
NEUTROPHILS # BLD AUTO: 5.4 K/UL
NEUTROPHILS NFR BLD: 86.8 %
NRBC BLD-RTO: 0 /100 WBC
OVALOCYTES BLD QL SMEAR: ABNORMAL
PLATELET # BLD AUTO: 70 K/UL
PMV BLD AUTO: 11.8 FL
POIKILOCYTOSIS BLD QL SMEAR: SLIGHT
POLYCHROMASIA BLD QL SMEAR: ABNORMAL
POTASSIUM SERPL-SCNC: 3.7 MMOL/L
POTASSIUM SERPL-SCNC: 3.8 MMOL/L
POTASSIUM SERPL-SCNC: 3.8 MMOL/L
POTASSIUM SERPL-SCNC: 4.2 MMOL/L
POTASSIUM SERPL-SCNC: 4.4 MMOL/L
PROTHROMBIN TIME: 15.9 SEC
RBC # BLD AUTO: 2.54 M/UL
SODIUM SERPL-SCNC: 151 MMOL/L
WBC # BLD AUTO: 6.21 K/UL

## 2018-05-29 PROCEDURE — A4216 STERILE WATER/SALINE, 10 ML: HCPCS | Performed by: THORACIC SURGERY (CARDIOTHORACIC VASCULAR SURGERY)

## 2018-05-29 PROCEDURE — 94761 N-INVAS EAR/PLS OXIMETRY MLT: CPT

## 2018-05-29 PROCEDURE — 92611 MOTION FLUOROSCOPY/SWALLOW: CPT

## 2018-05-29 PROCEDURE — 85610 PROTHROMBIN TIME: CPT

## 2018-05-29 PROCEDURE — 85025 COMPLETE CBC W/AUTO DIFF WBC: CPT

## 2018-05-29 PROCEDURE — 83735 ASSAY OF MAGNESIUM: CPT

## 2018-05-29 PROCEDURE — 84132 ASSAY OF SERUM POTASSIUM: CPT | Mod: 91

## 2018-05-29 PROCEDURE — 36415 COLL VENOUS BLD VENIPUNCTURE: CPT

## 2018-05-29 PROCEDURE — 63600175 PHARM REV CODE 636 W HCPCS: Performed by: STUDENT IN AN ORGANIZED HEALTH CARE EDUCATION/TRAINING PROGRAM

## 2018-05-29 PROCEDURE — 25000003 PHARM REV CODE 250: Performed by: THORACIC SURGERY (CARDIOTHORACIC VASCULAR SURGERY)

## 2018-05-29 PROCEDURE — 97165 OT EVAL LOW COMPLEX 30 MIN: CPT

## 2018-05-29 PROCEDURE — 20600001 HC STEP DOWN PRIVATE ROOM

## 2018-05-29 PROCEDURE — 97535 SELF CARE MNGMENT TRAINING: CPT

## 2018-05-29 PROCEDURE — 99232 SBSQ HOSP IP/OBS MODERATE 35: CPT | Mod: ,,, | Performed by: ANESTHESIOLOGY

## 2018-05-29 PROCEDURE — 97162 PT EVAL MOD COMPLEX 30 MIN: CPT

## 2018-05-29 PROCEDURE — 25000003 PHARM REV CODE 250: Performed by: STUDENT IN AN ORGANIZED HEALTH CARE EDUCATION/TRAINING PROGRAM

## 2018-05-29 PROCEDURE — 85730 THROMBOPLASTIN TIME PARTIAL: CPT

## 2018-05-29 PROCEDURE — 80048 BASIC METABOLIC PNL TOTAL CA: CPT

## 2018-05-29 PROCEDURE — 84132 ASSAY OF SERUM POTASSIUM: CPT

## 2018-05-29 PROCEDURE — 99233 SBSQ HOSP IP/OBS HIGH 50: CPT | Mod: GC,,, | Performed by: INTERNAL MEDICINE

## 2018-05-29 PROCEDURE — 93005 ELECTROCARDIOGRAM TRACING: CPT

## 2018-05-29 PROCEDURE — 83735 ASSAY OF MAGNESIUM: CPT | Mod: 91

## 2018-05-29 PROCEDURE — 97116 GAIT TRAINING THERAPY: CPT

## 2018-05-29 PROCEDURE — 27000221 HC OXYGEN, UP TO 24 HOURS

## 2018-05-29 RX ORDER — MUPIROCIN 20 MG/G
1 OINTMENT TOPICAL 2 TIMES DAILY
Status: DISCONTINUED | OUTPATIENT
Start: 2018-05-29 | End: 2018-05-29 | Stop reason: SDUPTHER

## 2018-05-29 RX ORDER — THEOPHYLLINE 200 MG/1
200 TABLET, EXTENDED RELEASE ORAL EVERY 12 HOURS
Status: DISCONTINUED | OUTPATIENT
Start: 2018-05-29 | End: 2018-05-29

## 2018-05-29 RX ADMIN — MUPIROCIN 1 G: 20 OINTMENT TOPICAL at 11:05

## 2018-05-29 RX ADMIN — AMLODIPINE BESYLATE 5 MG: 5 TABLET ORAL at 08:05

## 2018-05-29 RX ADMIN — ATORVASTATIN CALCIUM 40 MG: 20 TABLET, FILM COATED ORAL at 08:05

## 2018-05-29 RX ADMIN — Medication 3 ML: at 11:05

## 2018-05-29 RX ADMIN — DOXAZOSIN MESYLATE 4 MG: 2 TABLET ORAL at 08:05

## 2018-05-29 RX ADMIN — ACETAMINOPHEN 1000 MG: 10 INJECTION, SOLUTION INTRAVENOUS at 12:05

## 2018-05-29 RX ADMIN — ACETAMINOPHEN 1000 MG: 10 INJECTION, SOLUTION INTRAVENOUS at 07:05

## 2018-05-29 RX ADMIN — FAMOTIDINE 20 MG: 20 TABLET ORAL at 08:05

## 2018-05-29 RX ADMIN — ACETAMINOPHEN 1000 MG: 10 INJECTION, SOLUTION INTRAVENOUS at 06:05

## 2018-05-29 RX ADMIN — ASPIRIN 300 MG: 300 SUPPOSITORY RECTAL at 05:05

## 2018-05-29 RX ADMIN — MUPIROCIN 1 G: 20 OINTMENT TOPICAL at 08:05

## 2018-05-29 RX ADMIN — POTASSIUM BICARBONATE 25 MEQ: 25 TABLET, EFFERVESCENT ORAL at 05:05

## 2018-05-29 RX ADMIN — WARFARIN SODIUM 3 MG: 2 TABLET ORAL at 05:05

## 2018-05-29 NOTE — PLAN OF CARE
Problem: Patient Care Overview  Goal: Plan of Care Review  Hx:  Mitral regurg (severe), Aortic regurg (mild), Pulm HTN, HTN   Outcome: Ongoing (interventions implemented as appropriate)  Patient on room air, sats %. MAPs maintained 60-80. Heart-rate at 55-70, paced rhythm. NG tube placed and tube feed started at 50mL/hr. ENT consulted for nose bleed from first attempt NG tube in left nare. No action taken at this time. Lainez D/C'd/ A-line D/C'd. Lasix D/C'd. Patient transferred to CTSU. Potassium replaced by PRN meds. All questions answered by RN, will continue to monitor.

## 2018-05-29 NOTE — ASSESSMENT & PLAN NOTE
- Modified barium swallow today  - Off epi; wean cardene as tolerated for MAP <80  - Daily labs  - Pain meds PRN  - ASA, statin  - D/c beta blocker  - EP following; appreciate recs  - Stepdown

## 2018-05-29 NOTE — ASSESSMENT & PLAN NOTE
66 yo male admitted to [facility] for cardiac issues. S/p MVR and CABGx1. Noted to have post traumatic left sided epistaxis following attempted NGT placement. On the time of evaluation patient was hemostatic. No active bleeding noted.     -Given that patient is hemostatic, no acute intervention   -Recommend minimize nasal trauma as best possible  -If requires oxygen, ideal if it is humidified via face tent and not nasal canula  -Bacitracin to bilateral nares nightly  -Afrin q6hrs PRN bleeding (only if ok from Cards prospective)  -Ocean nasal spray q6hrs while awake (starting in a couple days, allow stable clot to form for now)  -If bleeding restarts, hold pressure to fleshy portion of nose (10 mins) and lean forward  -For bleeding that does not stop, page resident  -Remainder of medical management per primary team

## 2018-05-29 NOTE — ASSESSMENT & PLAN NOTE
68 y/o M with newly developed AFL with variable Av block, intermittent junctional bradycardia. POD# 4 s/p bioprostethetic mitral valve replacement and CABG x1.  Will continue to observe, no plan as of now for pacemaker implantation.    -Hold AV amelia blockers   -Keep epicardial pacer in place   -Agree with coumadin   -Recommend theophyline 200mg Q12h it can assist with increasing heart rate

## 2018-05-29 NOTE — PROGRESS NOTES
[facility]  Cardiac Electrophysiology  Progress Note    Admission Date: 5/23/2018  Code Status: Full Code   Attending Physician: Marvin Go MD   Expected Discharge Date: 5/31/2018  Principal Problem:S/P MVR (mitral valve replacement)    Subjective:   Interval Hx:  Epicardial pacemaker in place VVI 50bpm  Underlying rhythm AFL with variable AV block, intermittent junctional bradycardia   Past Medical History:   Diagnosis Date    ANNA (acute kidney injury) 2/22/2018    Anemia     Anxiety     Arthritis     BPH (benign prostatic hyperplasia)     CHF (congestive heart failure) 5/23/2018    Coronary artery disease of native artery of native heart with stable angina pectoris 5/21/2018    Diverticulosis     Encounter for blood transfusion     Hypertension     Urinary retention        Past Surgical History:   Procedure Laterality Date    APPENDECTOMY      COLONOSCOPY      COLONOSCOPY N/A 2/1/2018    Procedure: COLONOSCOPY;  Surgeon: Richar Payan MD;  Location: [address]);  Service: Endoscopy;  Laterality: N/A;  PM prep    CREATION OF AORTOCORONARY ARTERY BYPASS N/A 5/25/2018    Procedure: AORTOCORONARY BYPASS-CABG;  Surgeon: Marvin Go MD;  Location: [address];  Service: Cardiovascular;  Laterality: N/A;    EYE SURGERY Right     cataract extraction    FINGER SURGERY      FRACTURE SURGERY Right     index finger    MITRAL VALVE REPLACEMENT N/A 5/25/2018    Procedure: Mitral Valve Replacement;  Surgeon: Marvin Go MD;  Location: [address];  Service: Cardiovascular;  Laterality: N/A;    TONSILLECTOMY         Review of patient's allergies indicates:   Allergen Reactions    Shellfish containing products     Augmentin [amoxicillin-pot clavulanate]      Patient felt that it raised BP and requested to have it added as intolerance. Tolerated unasyn    Ciprofloxacin     Flagyl [metronidazole]     Lisinopril Other (See Comments)     cough    Mysoline  [primidone]     Omnicef [cefdinir]        No current facility-administered medications on file prior to encounter.      Current Outpatient Prescriptions on File Prior to Encounter   Medication Sig    albuterol-ipratropium 2.5mg-0.5mg/3mL (DUO-NEB) 0.5 mg-3 mg(2.5 mg base)/3 mL nebulizer solution 3 mLs every 4 (four) hours as needed.     aspirin (ECOTRIN) 81 MG EC tablet Take 1 tablet (81 mg total) by mouth once daily.    atorvastatin (LIPITOR) 20 MG tablet Take 2 tablets (40 mg total) by mouth once daily.    benzonatate (TESSALON) 100 MG capsule Take 100 mg by mouth 3 (three) times daily as needed for Cough.    furosemide (LASIX) 40 MG tablet Take 1 tablet (40 mg total) by mouth once daily.    loratadine (CLARITIN) 10 mg tablet Take 10 mg by mouth once daily.    mirtazapine (REMERON) 30 MG tablet Take 1 tablet (30 mg total) by mouth every evening.    pantoprazole (PROTONIX) 40 MG tablet Take 1 tablet (40 mg total) by mouth once daily.    tamsulosin (FLOMAX) 0.4 mg Cp24 Take 2 capsules (0.8 mg total) by mouth 2 (two) times daily.    VENTOLIN HFA 90 mcg/actuation inhaler      Family History     Problem Relation (Age of Onset)    Heart disease Mother, Father    No Known Problems Sister, Brother    Stroke Mother        Social History Main Topics    Smoking status: Never Smoker    Smokeless tobacco: Never Used    Alcohol use No      Comment: none recently    Drug use: No    Sexual activity: Not Currently     Partners: Female     Review of Systems   All other systems reviewed and are negative.    Objective:     Vital Signs (Most Recent):  Temp: 98.3 °F (36.8 °C) (05/29/18 1100)  Pulse: 74 (05/29/18 1200)  Resp: (!) 33 (05/29/18 1200)  BP: (!) 140/63 (05/29/18 1200)  SpO2: 97 % (05/29/18 1200) Vital Signs (24h Range):  Temp:  [97.7 °F (36.5 °C)-98.9 °F (37.2 °C)] 98.3 °F (36.8 °C)  Pulse:  [64-80] 74  Resp:  [14-59] 33  SpO2:  [87 %-98 %] 97 %  BP: (115-140)/(55-63) 140/63  Arterial Line BP:  (105-154)/(37-65) 122/55     Weight: 68.5 kg (151 lb 0.2 oz)  Body mass index is 21.67 kg/m².    SpO2: 97 %  O2 Device (Oxygen Therapy): nasal cannula    Physical Exam   Constitutional: He is oriented to person, place, and time. He appears well-developed and well-nourished.   HENT:   Head: Normocephalic and atraumatic.   Eyes: No scleral icterus.   Cardiovascular: Regular rhythm.    Pulmonary/Chest: Effort normal and breath sounds normal. No respiratory distress. He has no wheezes. He has no rales.   Musculoskeletal: He exhibits no edema.   Neurological: He is alert and oriented to person, place, and time.   Skin: Skin is warm.       Significant Labs: All pertinent lab results from the last 24 hours have been reviewed.    Significant Imaging: EKG: AFL with variable AV block, subsequent EKG Junctional bradycardia     Assessment and Plan:     Atrial flutter    66 y/o M with newly developed AFL with variable Av block, intermittent junctional bradycardia. POD# 4 s/p bioprostethetic mitral valve replacement and CABG x1.  Will continue to observe, no plan as of now for pacemaker implantation.    -Hold AV amelia blockers   -Keep epicardial pacer in place   -Agree with coumadin   -Recommend theophyline 200mg Q12h it can assist with increasing heart rate            Bonifacio Finley MD  Cardiac Electrophysiology  [facility]

## 2018-05-29 NOTE — PLAN OF CARE
Problem: Patient Care Overview  Goal: Plan of Care Review  Recommendations  Recommendation/Intervention:   1. As medically able, initiate TF of Isosource 1.5 at a goal rate of 50 mL/hr + 2 packets/day Beneprotein - to provide 1875 kcal/day, 94 g protein/day, and 917 mL free fluid/day.   2. As medically appropriate, ADAT to Cardiac with texutre per SLP.   RD to monitor.    Goals: Patient to receive nutrition by RD follow-up  Nutrition Goal Status: new    Full assessment completed, see RD Note 5/29/2018.

## 2018-05-29 NOTE — PLAN OF CARE
Problem: Occupational Therapy Goal  Goal: Occupational Therapy Goal  Goals to be met by: 5/8/18     Patient will increase functional independence with ADLs by performing:    Feeding with Modified [place].  UE Dressing with Supervision.  LE Dressing with Supervision.  Grooming while standing at sink with Supervision.  Toileting from toilet with Supervision for hygiene and clothing management.   Toilet transfer to toilet with Supervision.    Outcome: Ongoing (interventions implemented as appropriate)  OT eval completed, and above goals established. MILTON Olguin  5/29/2018

## 2018-05-29 NOTE — PT/OT/SLP EVAL
Occupational Therapy   Evaluation    Name: Rodolfo Messina  MRN: [id number]  Admitting Diagnosis:  S/P MVR (mitral valve replacement) 4 Days Post-Op    Recommendations:     Discharge Recommendations: home with home health  Discharge Equipment Recommendations:  none  Barriers to discharge:  None    History:     Occupational Profile:  Living Environment: lives with spouse and grandson in [facility] with no AMANDA  Previous level of function: (I) with ADL and ambulation  Roles and Routines: ; retired; drives; active around the home including yard work and washing cars  Equipment Owned:  none  Assistance upon Discharge: spouse can assist    Past Medical History:   Diagnosis Date    ANNA (acute kidney injury) 2/22/2018    Anemia     Anxiety     Arthritis     BPH (benign prostatic hyperplasia)     CHF (congestive heart failure) 5/23/2018    Coronary artery disease of native artery of native heart with stable angina pectoris 5/21/2018    Diverticulosis     Encounter for blood transfusion     Hypertension     Urinary retention        Past Surgical History:   Procedure Laterality Date    APPENDECTOMY      COLONOSCOPY      COLONOSCOPY N/A 2/1/2018    Procedure: COLONOSCOPY;  Surgeon: Richar Payan MD;  Location: [facility] [place]  Service: Endoscopy;  Laterality: N/A;  PM prep    CREATION OF AORTOCORONARY ARTERY BYPASS N/A 5/25/2018    Procedure: AORTOCORONARY BYPASS-CABG;  Surgeon: Marvin Go MD;  Location: [facility] OR [address] [place] [address];  Service: Cardiovascular;  Laterality: N/A;    EYE SURGERY Right     cataract extraction    FINGER SURGERY      FRACTURE SURGERY Right     index finger    MITRAL VALVE REPLACEMENT N/A 5/25/2018    Procedure: Mitral Valve Replacement;  Surgeon: Marvin Go MD;  Location: [facility] OR [address] [place] [address];  Service: Cardiovascular;  Laterality: N/A;    TONSILLECTOMY         Subjective     Chief Complaint: Wife reports that pt has been having some hallucinations  Patient/Family stated goals: to get  better and go home  Communicated with: RN prior to session.  Pain/Comfort:  · Pain Rating 1: 0/10  · Pain Rating Post-Intervention 1: 0/10    Patients cultural, spiritual, Rastafarian conflicts given the current situation: none reported    Objective:     Patient found with: blood pressure cuff, pulse ox (continuous), telemetry, arterial line, chest tube, lackey catheter, peripheral IV, external pacer    General Precautions: Standard, fall, sternal   Orthopedic Precautions:    Braces:       Occupational Performance:    Bed Mobility:    · NT as pt UIC upon OT arrival    Functional Mobility/Transfers:  · Patient completed Sit <> Stand Transfer with minimum assistance  with  no assistive device from b/s chair  · Functional Mobility: Minimal A to/from sink    Activities of Daily Living:  · Feeding:  stand by assistance    · Grooming: minimum assistance    · UB Dressing: moderate assistance    · LB Dressing: maximal assistance    · Toileting: maximal assistance      Cognitive/Visual Perceptual:  Oriented to: Person, Place, Time and Situation  Follows Commands/attention: Follows commands  Communication: clear/fluent  Memory:  No Deficits noted  Safety awareness/insight to disability: impaired; slightly impulsive  Coping skills/emotional control: Appropriate to situation    Physical Exam:  Postural examination/scapula alignment:    -       No postural abnormalities identified  Sensation:    -       Intact  Upper Extremity Range of Motion:     -       Right Upper Extremity: WFL  -       Left Upper Extremity: WFL  Upper Extremity Strength:    -       Right Upper Extremity: WFL  -       Left Upper Extremity: WFL   Strength:    -       Right Upper Extremity: WFL  -       Left Upper Extremity: WFL  Fine Motor Coordination:    -       Intact  Gross motor coordination:   WFL    Patient left up in chair with all lines intact, call button in reach, rn notified and spouse present    [facility] 6 Click:  [facility] Total Score:  "14    Treatment & Education:  Pt ed on OT POC  Pt ed on sternal precautions during ADL; handout provided  Pt stood sinkside x 2 minutes with CGA while engaged in self-care tasks  Education:    Assessment:     Rodolfo Messina is a 67 y.o. male with a medical diagnosis of S/P MVR (mitral valve replacement).  Performance deficits affecting function are weakness, impaired self care skills, impaired balance, impaired functional mobilty, impaired endurance, gait instability, decreased upper extremity function, decreased safety awareness, impaired cardiopulmonary response to activity.      Rehab Prognosis:  Good; patient would benefit from acute skilled OT services to address these deficits and reach maximum level of function.         Clinical Decision Makin.  OT Low:  "Pt evaluation falls under low complexity for evaluation coding due to performance deficits noted in 1-3 areas as stated above and 0 co-morbities affecting current functional status. Data obtained from problem focused assessments. No modifications or assistance was required for completion of evaluation. Only brief occupational profile and history review completed."     Plan:     Patient to be seen 5 x/week to address the above listed problems via self-care/home management, therapeutic activities, therapeutic exercises  · Plan of Care Expires: 18  · Plan of Care Reviewed with: spouse    This Plan of care has been discussed with the patient who was involved in its development and understands and is in agreement with the identified goals and treatment plan    GOALS:    Occupational Therapy Goals        Problem: Occupational Therapy Goal    Goal Priority Disciplines Outcome Interventions   Occupational Therapy Goal     OT, PT/OT Ongoing (interventions implemented as appropriate)    Description:  Goals to be met by: 18     Patient will increase functional independence with ADLs by performing:    Feeding with Modified [place].  UE Dressing " with Supervision.  LE Dressing with Supervision.  Grooming while standing at sink with Supervision.  Toileting from toilet with Supervision for hygiene and clothing management.   Toilet transfer to toilet with Supervision.                      Time Tracking:     OT Date of Treatment: 05/29/18  OT Start Time: 0909  OT Stop Time: 0931  OT Total Time (min): 22 min    Billable Minutes:Evaluation 10  Self Care/Home Management 10    MILTON Olguin  5/29/2018

## 2018-05-29 NOTE — CONSULTS
[facility]  Otorhinolaryngology-Head & Neck Surgery  Consult Note    Patient Name: Rodolfo Messina  MRN: [id number]  Code Status: Full Code  Admission Date: 5/23/2018  Hospital Length of Stay: 6 days  Attending Physician: Marvin Go MD  Primary Care Provider: Deric Claudio MD    Patient information was obtained from patient.     Inpatient consult to ENT  Consult performed by: TEVIN KENT  Consult ordered by: TRACEY MARISCAL        Subjective:     Chief Complaint/Reason for Admission: Epistaxis    History of Present Illness: Rodolfo Harris is a 66 yo male with PMH of severe mitral valve regurgitation (2/2 to endocarditis), tricuspid valve regurgitation, and pulmonary hypertension. The patient is admitted to [facility] for treatment of cardiac issues. S/p MVR and single vessel CABG 5/25/18.  Slowly progressing from surgery. Patient noted to have epistaxis following NGT placement. Otolaryngology consulted for evaluation. Bleeding lasted roughly 5-10 minutes from left nostril.  This self resolved by self. The patient is on aspirin and coumadin. Denies sensation of bleeding down throat. No difficulty breathing.       Medications:  Continuous Infusions:   furosemide (LASIX) 2 mg/mL infusion (non-titrating) Stopped (05/29/18 1100)    nicardipine Stopped (05/28/18 0900)    norepinephrine bitartrate-D5W Stopped (05/25/18 1510)    propofol Stopped (05/26/18 1910)     Scheduled Meds:   acetaminophen  1,000 mg Intravenous Q6H    amLODIPine  5 mg Oral Daily    aspirin  300 mg Rectal Daily    atorvastatin  40 mg Oral Daily    doxazosin  4 mg Oral Daily    famotidine  20 mg Oral Daily    mupirocin  1 g Nasal BID    polyethylene glycol  17 g Oral Daily    sodium chloride 0.9%  3 mL Intravenous Q8H    theophylline  200 mg Oral Q12H    warfarin  3 mg Oral Daily     PRN Meds:albumin human 5%, albuterol-ipratropium, bisacodyl, hydrALAZINE, lactated ringers, lactulose, magnesium sulfate IVPB,  metoclopramide HCl, ondansetron, potassium chloride in water **AND** potassium chloride in water **AND** potassium chloride in water, sodium phosphate IVPB, sodium phosphate IVPB, sodium phosphate IVPB, white petrolatum-mineral oil     No current facility-administered medications on file prior to encounter.      Current Outpatient Prescriptions on File Prior to Encounter   Medication Sig    albuterol-ipratropium 2.5mg-0.5mg/3mL (DUO-NEB) 0.5 mg-3 mg(2.5 mg base)/3 mL nebulizer solution 3 mLs every 4 (four) hours as needed.     aspirin (ECOTRIN) 81 MG EC tablet Take 1 tablet (81 mg total) by mouth once daily.    atorvastatin (LIPITOR) 20 MG tablet Take 2 tablets (40 mg total) by mouth once daily.    benzonatate (TESSALON) 100 MG capsule Take 100 mg by mouth 3 (three) times daily as needed for Cough.    furosemide (LASIX) 40 MG tablet Take 1 tablet (40 mg total) by mouth once daily.    loratadine (CLARITIN) 10 mg tablet Take 10 mg by mouth once daily.    mirtazapine (REMERON) 30 MG tablet Take 1 tablet (30 mg total) by mouth every evening.    pantoprazole (PROTONIX) 40 MG tablet Take 1 tablet (40 mg total) by mouth once daily.    tamsulosin (FLOMAX) 0.4 mg Cp24 Take 2 capsules (0.8 mg total) by mouth 2 (two) times daily.    VENTOLIN HFA 90 mcg/actuation inhaler        Review of patient's allergies indicates:   Allergen Reactions    Shellfish containing products     Augmentin [amoxicillin-pot clavulanate]      Patient felt that it raised BP and requested to have it added as intolerance. Tolerated unasyn    Ciprofloxacin     Flagyl [metronidazole]     Lisinopril Other (See Comments)     cough    Mysoline [primidone]     Omnicef [cefdinir]        Past Medical History:   Diagnosis Date    ANNA (acute kidney injury) 2/22/2018    Anemia     Anxiety     Arthritis     BPH (benign prostatic hyperplasia)     CHF (congestive heart failure) 5/23/2018    Coronary artery disease of native artery of native  heart with stable angina pectoris 5/21/2018    Diverticulosis     Encounter for blood transfusion     Hypertension     Urinary retention      Past Surgical History:   Procedure Laterality Date    APPENDECTOMY      COLONOSCOPY      COLONOSCOPY N/A 2/1/2018    Procedure: COLONOSCOPY;  Surgeon: Richar Payan MD;  Location: [facility] (4TH FLR);  Service: Endoscopy;  Laterality: N/A;  PM prep    CREATION OF AORTOCORONARY ARTERY BYPASS N/A 5/25/2018    Procedure: AORTOCORONARY BYPASS-CABG;  Surgeon: Marvin Go MD;  Location: [facility] OR [address] [place] [address];  Service: Cardiovascular;  Laterality: N/A;    EYE SURGERY Right     cataract extraction    FINGER SURGERY      FRACTURE SURGERY Right     index finger    MITRAL VALVE REPLACEMENT N/A 5/25/2018    Procedure: Mitral Valve Replacement;  Surgeon: Marvin Go MD;  Location: [facility] OR [address] [place] [address];  Service: Cardiovascular;  Laterality: N/A;    TONSILLECTOMY       Family History     Problem Relation (Age of Onset)    Heart disease Mother, Father    No Known Problems Sister, Brother    Stroke Mother        Social History Main Topics    Smoking status: Never Smoker    Smokeless tobacco: Never Used    Alcohol use No      Comment: none recently    Drug use: No    Sexual activity: Not Currently     Partners: Female     Review of Systems  Objective:     Vital Signs (Most Recent):  Temp: 98.3 °F (36.8 °C) (05/29/18 1500)  Pulse: (!) 56 (05/29/18 1600)  Resp: (!) 25 (05/29/18 1600)  BP: (!) 145/65 (05/29/18 1600)  SpO2: 96 % (05/29/18 1600) Vital Signs (24h Range):  Temp:  [98 °F (36.7 °C)-98.9 °F (37.2 °C)] 98.3 °F (36.8 °C)  Pulse:  [56-80] 56  Resp:  [15-59] 25  SpO2:  [87 %-98 %] 96 %  BP: (115-146)/(55-65) 145/65  Arterial Line BP: (105-159)/(37-65) 159/57     Weight: 68.5 kg (151 lb 0.2 oz)  Body mass index is 21.67 kg/m².      Date 05/29/18 0700 - 05/30/18 0659   Shift 0754-5701 5316-7807 0180-9406 24 Hour Total   I  N  T  A  K  E   NG/GT  250  250    Shift  Total  (mL/kg)  250  (3.6)  250  (3.6)   O  U  T  P  U  T   Urine  (mL/kg/hr) 520  (0.9) 80  600    Shift Total  (mL/kg) 520  (7.6) 80  (1.2)  600  (8.8)   Weight (kg) 68.5 68.5 68.5 68.5       Physical Exam    General: Alert,  Comfortable, no acute distress  Voice: Regular for age, Slightly weak volume.   Respiratory: Symmetric breathing, no stridor, no distress  Head: Normocephalic, no lesions  Face: Symmetric, HB 1/6 bilat, no lesions, no obvious sinus tenderness, salivary glands nontender  Eyes: Sclera white, extraocular movements intact  Nose: NGT in right nostril. Left nostril with old blood stain. No active bleeding.   Right Ear: Pinna and external ear appears normal, EAC patent  Left Ear: Pinna and external ear appears normal, EAC patent  Hearing: Grossly intact  Oral cavity/OP: Old blood stained mucus in OP. Stable small old clot in nasopharynx. No active bleeding. Healthy mucosa, no masses or lesions including lips, gums, floor of mouth, palate, or tongue.  Neck: Supple, no palpable nodes, no masses, trachea midline, no thyroid masses  Cardiovascular system: Sternotomy incision intact.     Significant Labs:  BMP:   Recent Labs  Lab 05/29/18  0350  05/29/18  1743   GLU 96  --   --    *  --   --    CO2 25  --   --    BUN 89*  --   --    CREATININE 2.8*  --   --    CALCIUM 9.2  --   --    MG 2.9*  < > 3.1*   < > = values in this interval not displayed.  CBC:   Recent Labs  Lab 05/29/18  0350   WBC 6.21   RBC 2.54*   HGB 7.8*   HCT 23.8*   PLT 70*   MCV 94   MCH 30.7   MCHC 32.8       Significant Diagnostics:  None    Assessment/Plan:     Epistaxis    68 yo male admitted to [facility] for cardiac issues. S/p MVR and CABGx1. Noted to have post traumatic left sided epistaxis following attempted NGT placement. On the time of evaluation patient was hemostatic. No active bleeding noted.     -Given that patient is hemostatic, no acute intervention   -Recommend minimize nasal trauma as best possible  -If requires  oxygen, ideal if it is humidified via face tent and not nasal canula  -Bacitracin to bilateral nares nightly  -Afrin q6hrs PRN bleeding (only if ok from Cards prospective)  -Ocean nasal spray q6hrs while awake (starting in a couple days, allow stable clot to form for now)  -If bleeding restarts, hold pressure to fleshy portion of nose (10 mins) and lean forward  -For bleeding that does not stop, page resident  -Remainder of medical management per primary team          VTE Risk Mitigation         Ordered     warfarin tablet 3 mg  Daily      05/27/18 1034     IP VTE HIGH RISK PATIENT  Once      05/25/18 1511     Reason for No Pharmacological VTE Prophylaxis  Once      05/25/18 1511     Place sequential compression device  Until discontinued      05/25/18 1511          Thank you for your consult. I will sign off. Please contact us if you have any additional questions.    Balwinder Ortiz MD  Otorhinolaryngology-Head & Neck Surgery  [facility]

## 2018-05-29 NOTE — SUBJECTIVE & OBJECTIVE
Past Medical History:   Diagnosis Date    ANNA (acute kidney injury) 2/22/2018    Anemia     Anxiety     Arthritis     BPH (benign prostatic hyperplasia)     CHF (congestive heart failure) 5/23/2018    Coronary artery disease of native artery of native heart with stable angina pectoris 5/21/2018    Diverticulosis     Encounter for blood transfusion     Hypertension     Urinary retention        Past Surgical History:   Procedure Laterality Date    APPENDECTOMY      COLONOSCOPY      COLONOSCOPY N/A 2/1/2018    Procedure: COLONOSCOPY;  Surgeon: Richar Payan MD;  Location: [facility] (4TH FLR);  Service: Endoscopy;  Laterality: N/A;  PM prep    CREATION OF AORTOCORONARY ARTERY BYPASS N/A 5/25/2018    Procedure: AORTOCORONARY BYPASS-CABG;  Surgeon: Marvin Go MD;  Location: [facility] OR 2ND FLR;  Service: Cardiovascular;  Laterality: N/A;    EYE SURGERY Right     cataract extraction    FINGER SURGERY      FRACTURE SURGERY Right     index finger    MITRAL VALVE REPLACEMENT N/A 5/25/2018    Procedure: Mitral Valve Replacement;  Surgeon: Marvin Go MD;  [facility];  Service: Cardiovascular;  Laterality: N/A;    TONSILLECTOMY         Review of patient's allergies indicates:   Allergen Reactions    Shellfish containing products     Augmentin [amoxicillin-pot clavulanate]      Patient felt that it raised BP and requested to have it added as intolerance. Tolerated unasyn    Ciprofloxacin     Flagyl [metronidazole]     Lisinopril Other (See Comments)     cough    Mysoline [primidone]     Omnicef [cefdinir]        No current facility-administered medications on file prior to encounter.      Current Outpatient Prescriptions on File Prior to Encounter   Medication Sig    albuterol-ipratropium 2.5mg-0.5mg/3mL (DUO-NEB) 0.5 mg-3 mg(2.5 mg base)/3 mL nebulizer solution 3 mLs every 4 (four) hours as needed.     aspirin (ECOTRIN) 81 MG EC tablet Take 1 tablet (81 mg total) by mouth once  daily.    atorvastatin (LIPITOR) 20 MG tablet Take 2 tablets (40 mg total) by mouth once daily.    benzonatate (TESSALON) 100 MG capsule Take 100 mg by mouth 3 (three) times daily as needed for Cough.    furosemide (LASIX) 40 MG tablet Take 1 tablet (40 mg total) by mouth once daily.    loratadine (CLARITIN) 10 mg tablet Take 10 mg by mouth once daily.    mirtazapine (REMERON) 30 MG tablet Take 1 tablet (30 mg total) by mouth every evening.    pantoprazole (PROTONIX) 40 MG tablet Take 1 tablet (40 mg total) by mouth once daily.    tamsulosin (FLOMAX) 0.4 mg Cp24 Take 2 capsules (0.8 mg total) by mouth 2 (two) times daily.    VENTOLIN HFA 90 mcg/actuation inhaler      Family History     Problem Relation (Age of Onset)    Heart disease Mother, Father    No Known Problems Sister, Brother    Stroke Mother        Social History Main Topics    Smoking status: Never Smoker    Smokeless tobacco: Never Used    Alcohol use No      Comment: none recently    Drug use: No    Sexual activity: Not Currently     Partners: Female     Review of Systems   All other systems reviewed and are negative.    Objective:     Vital Signs (Most Recent):  Temp: 98.3 °F (36.8 °C) (05/29/18 1100)  Pulse: 74 (05/29/18 1200)  Resp: (!) 33 (05/29/18 1200)  BP: (!) 140/63 (05/29/18 1200)  SpO2: 97 % (05/29/18 1200) Vital Signs (24h Range):  Temp:  [97.7 °F (36.5 °C)-98.9 °F (37.2 °C)] 98.3 °F (36.8 °C)  Pulse:  [64-80] 74  Resp:  [14-59] 33  SpO2:  [87 %-98 %] 97 %  BP: (115-140)/(55-63) 140/63  Arterial Line BP: (105-154)/(37-65) 122/55     Weight: 68.5 kg (151 lb 0.2 oz)  Body mass index is 21.67 kg/m².    SpO2: 97 %  O2 Device (Oxygen Therapy): nasal cannula    Physical Exam   Constitutional: He is oriented to person, place, and time. He appears well-developed and well-nourished.   HENT:   Head: Normocephalic and atraumatic.   Eyes: No scleral icterus.   Cardiovascular: Regular rhythm.    Pulmonary/Chest: Effort normal and breath  sounds normal. No respiratory distress. He has no wheezes. He has no rales.   Musculoskeletal: He exhibits no edema.   Neurological: He is alert and oriented to person, place, and time.   Skin: Skin is warm.       Significant Labs: All pertinent lab results from the last 24 hours have been reviewed.    Significant Imaging: EKG: AFL with variable AV block, subsequent EKG Junctional bradycardia

## 2018-05-29 NOTE — SUBJECTIVE & OBJECTIVE
Interval History: No acute events overnight. Doing well this am. Off pressors/inotrops.    Medications:  Continuous Infusions:   furosemide (LASIX) 2 mg/mL infusion (non-titrating) 7.5 mg/hr (05/29/18 0800)    nicardipine Stopped (05/28/18 0900)    norepinephrine bitartrate-D5W Stopped (05/25/18 1510)    propofol Stopped (05/26/18 1910)     Scheduled Meds:   acetaminophen  1,000 mg Intravenous Q6H    amLODIPine  5 mg Oral Daily    aspirin  300 mg Rectal Daily    atorvastatin  40 mg Oral Daily    doxazosin  4 mg Oral Daily    famotidine  20 mg Oral Daily    mupirocin  1 g Nasal BID    polyethylene glycol  17 g Oral Daily    sodium chloride 0.9%  3 mL Intravenous Q8H    warfarin  3 mg Oral Daily     PRN Meds:albumin human 5%, albuterol-ipratropium, bisacodyl, hydrALAZINE, lactated ringers, lactulose, magnesium sulfate IVPB, metoclopramide HCl, ondansetron, potassium chloride in water **AND** potassium chloride in water **AND** potassium chloride in water, sodium phosphate IVPB, sodium phosphate IVPB, sodium phosphate IVPB, white petrolatum-mineral oil     Objective:     Vital Signs (Most Recent):  Temp: 98 °F (36.7 °C) (05/29/18 0700)  Pulse: 64 (05/29/18 0800)  Resp: (!) 53 (05/29/18 0800)  BP: 134/60 (05/29/18 0800)  SpO2: 96 % (05/29/18 0800) Vital Signs (24h Range):  Temp:  [97.7 °F (36.5 °C)-98.9 °F (37.2 °C)] 98 °F (36.7 °C)  Pulse:  [64-80] 64  Resp:  [14-59] 53  SpO2:  [87 %-98 %] 96 %  BP: (105-134)/(51-63) 134/60  Arterial Line BP: ()/(41-98) 139/51     Weight: 68.5 kg (151 lb 0.2 oz)  Body mass index is 21.67 kg/m².    SpO2: 96 %  O2 Device (Oxygen Therapy): nasal cannula    Intake/Output - Last 3 Shifts       05/27 0700 - 05/28 0659 05/28 0700 - 05/29 0659 05/29 0700 - 05/30 0659    I.V. (mL/kg) 494.9 (7.1) 1121 (16.4)     Total Intake(mL/kg) 494.9 (7.1) 1121 (16.4)     Urine (mL/kg/hr) 4575 (2.7) 2835 (1.7) 140 (1.3)    Chest Tube 270 (0.2) 70 (0)     Total Output 4845 2905 140    Net  -4350.2 -1784 -140           Urine Occurrence 1 x      Stool Occurrence  1 x           Lines/Drains/Airways     Peripherally Inserted Central Catheter Line                 PICC Double Lumen 03/05/18 1539 right basilic 84 days          Central Venous Catheter Line                 Percutaneous Central Line Insertion/Assessment - Quad lumen  05/25/18 0800 left subclavian 4 days          Drain                 Chest Tube 05/25/18 1515 Other (Comment) Pleural 3 days         Y Chest Tube 3 and 4 05/25/18 1354 3 Right Pleural 32 Fr. 4 Left Pericardial 32 Fr. 3 days         Urethral Catheter 05/28/18 0245 1 day          Arterial Line                 Arterial Line 05/25/18 0745 Left Brachial 4 days          Line                 Pacer Wires 05/25/18 1355 3 days          Peripheral Intravenous Line                 Peripheral IV - Single Lumen 05/27/18 1135 Right Forearm 1 day                Physical Exam   Constitutional: He appears well-developed.   Cardiovascular:   Paced at 80   Pulmonary/Chest: Effort normal.   Neurological: He is alert.   Skin: Skin is warm and dry.       Significant Labs:  CBC:   Recent Labs  Lab 05/29/18  0350   WBC 6.21   RBC 2.54*   HGB 7.8*   HCT 23.8*   PLT 70*   MCV 94   MCH 30.7   MCHC 32.8     CMP:   Recent Labs  Lab 05/29/18  0350   GLU 96   CALCIUM 9.2   *   K 3.8  3.8   CO2 25   *   BUN 89*   CREATININE 2.8*     Coagulation:   Recent Labs  Lab 05/29/18  0350   INR 1.6*   APTT 37.6*

## 2018-05-29 NOTE — PROGRESS NOTES
[facility]  Critical Care - Surgery  Progress Note    Patient Name: Rodolfo Messina  MRN: [id number]  Admission Date: 5/23/2018  Hospital Length of Stay: 6 days  Code Status: Full Code  Attending Provider: Marvin Go MD  Primary Care Provider: Deric Claudio MD   Principal Problem: S/P MVR (mitral valve replacement)    Subjective:     Hospital/ICU Course:  No notes on file    Interval History/Significant Events: EPS recommended SOWMYA/DCCV yesterday but LA thrombus noted on SOWMYA. Will likely need PPM placement. More oriented this am but still with some hallucinations which patient realizes are not real.    Follow-up For: Procedure(s) (LRB):  Mitral Valve Replacement (N/A)  AORTOCORONARY BYPASS-CABG (N/A)    Post-Operative Day: 4 Days Post-Op    Objective:     Vital Signs (Most Recent):  Temp: 98.7 °F (37.1 °C) (05/28/18 2300)  Pulse: 79 (05/29/18 0515)  Resp: (!) 27 (05/29/18 0515)  BP: 132/61 (05/29/18 0500)  SpO2: 96 % (05/29/18 0515) Vital Signs (24h Range):  Temp:  [97.7 °F (36.5 °C)-98.9 °F (37.2 °C)] 98.7 °F (37.1 °C)  Pulse:  [79-80] 79  Resp:  [14-59] 27  SpO2:  [87 %-99 %] 96 %  BP: (105-132)/(51-63) 132/61  Arterial Line BP: ()/(41-98) 149/65     Weight: 69.9 kg (154 lb 1.6 oz)  Body mass index is 22.11 kg/m².      Intake/Output Summary (Last 24 hours) at 05/29/18 0632  Last data filed at 05/29/18 0500   Gross per 24 hour   Intake              605 ml   Output             2555 ml   Net            -1950 ml       Physical Exam   Constitutional: He is oriented to person, place, and time. He appears well-developed.   HENT:   Head: Normocephalic and atraumatic.   Eyes: EOM are normal. Pupils are equal, round, and reactive to light.   Neck: Normal range of motion. Neck supple.   Cardiovascular: Regular rhythm.    Paced at 80   Pulmonary/Chest:   Extubated, on nasal canula    Abdominal: Soft. Bowel sounds are normal. He exhibits no distension. There is no tenderness.   Neurological: He  is alert and oriented to person, place, and time.   Per nurse, still having some hallucinations   Skin: Skin is warm and dry.       Vents:  Vent Mode: Spont (05/26/18 2025)  Ventilator Initiated: Yes (05/25/18 1509)  Set Rate: 0 bmp (05/26/18 2025)  Vt Set: 500 mL (05/26/18 2025)  Pressure Support: 10 cmH20 (05/26/18 2025)  PEEP/CPAP: 5 cmH20 (05/26/18 2025)  Oxygen Concentration (%): 24 (05/29/18 0402)  Peak Airway Pressure: 16 cmH2O (05/26/18 2025)  Plateau Pressure: 0 cmH20 (05/26/18 2025)  Total Ve: 11.8 mL (05/26/18 2025)  F/VT Ratio<105 (RSBI): (!) 32.76 (05/26/18 2025)    Lines/Drains/Airways     Peripherally Inserted Central Catheter Line                 PICC Double Lumen 03/05/18 1539 right basilic 84 days          Central Venous Catheter Line                 Percutaneous Central Line Insertion/Assessment - Quad lumen  05/25/18 0800 left subclavian 3 days          Drain                 Chest Tube 05/25/18 1515 Other (Comment) Pleural 3 days         Y Chest Tube 3 and 4 05/25/18 1354 3 Right Pleural 32 Fr. 4 Left Pericardial 32 Fr. 3 days         Urethral Catheter 05/28/18 0245 1 day          Arterial Line                 Arterial Line 05/25/18 0745 Left Brachial 3 days          Line                 Pacer Wires 05/25/18 1355 3 days          Peripheral Intravenous Line                 Peripheral IV - Single Lumen 05/23/18 1700 Left Wrist 5 days         Peripheral IV - Single Lumen 05/27/18 1135 Right Forearm 1 day                Significant Labs:    CBC/Anemia Profile:    Recent Labs  Lab 05/27/18  1534 05/28/18  0303 05/29/18  0350   WBC  --  6.65 6.21   HGB  --  8.2* 7.8*   HCT  --  25.0* 23.8*   PLT 54* 71* 70*   MCV  --  94 94   RDW  --  15.9* 15.9*        Chemistries:    Recent Labs  Lab 05/28/18  0303  05/28/18  1749 05/29/18  0046 05/29/18  0350   *  --   --   --   --    K 4.1  4.1  < > 4.4 4.2 3.8     --   --   --   --    CO2 25  --   --   --   --    BUN 66*  --   --   --   --     CREATININE 2.3*  --   --   --   --    CALCIUM 9.2  --   --   --   --    MG 2.6  < > 2.8* 2.9* 2.9*   < > = values in this interval not displayed.    All pertinent labs within the past 24 hours have been reviewed.    Significant Imaging:  I have reviewed all pertinent imaging results/findings within the past 24 hours.    Assessment/Plan:     Chronic systolic congestive heart failure     68yo M with PMH of mitral valve endocarditis and resultant severe MR, TR and pulmonary HTN who is now s/p IABP placement (5/24) and s/pMVR and CABG 5/25.     Plan:     Neuro:   -Hallucinations but overal mental status improving     Delirium precautions   -Pain control per CTS     Pulmonary:   -Extubated, saturating well on nasal canula, wean as tolerated  -ABGs prn  ABG    Recent Labs  Lab 05/27/18  0822   PH 7.478*   PO2 75*   PCO2 32.1*   HCO3 23.8*   BE 0     -Continuous pulse ox     Cardiac:  -MAP goal >65  -Paced at 80  -Cardene gtt stopped yesterday.  -Amlodipine daily  -Statin daily  -TTE revealing LA thrombus yesterday so cannot perform DCCV at this time  -Coumadin therapy started given clot  -Will likely need PPM, will follow up with EPS  -Continuous cardiac monitoring     Renal:   -UOP adequate 2.5L yesterday on lasix drip  -Flomax daily given home use  -Bun/Cr 66/2.3 (baseline Cr 1.4-1.6), continues to increase     Fluids/Electrolytes/Nutrition/GI:   -Nutritional status: NPO except meds  -Barium swallow ordered  -replace lytes PRN  -Bowel regimen     Hematology/Oncology:  -H/H 7.8/23.8, continue to monitor  -INR/Plts 1.6/15.9  -CBC daily     Infectious Disease:   -Afebrile  -WBC 6  -CBC daily   -Abx: ancef x 5 doses, completed     Endocrine:  - No h/o DM or thyroid dysfunction  - Not currently requiring insulin gtt for glucose control  - Glucose goal of 120-180     Dispo:  -Step down per CTS  -CTS primary                       Critical care was time spent personally by me on the following activities: development of  treatment plan with patient or surrogate and bedside caregivers, discussions with consultants, evaluation of patient's response to treatment, examination of patient, ordering and performing treatments and interventions, ordering and review of laboratory studies, ordering and review of radiographic studies, pulse oximetry, re-evaluation of patient's condition.  This critical care time did not overlap with that of any other provider or involve time for any procedures.     Ludmila Santana MD  Critical Care - Surgery  [facility]

## 2018-05-29 NOTE — PHYSICIAN QUERY
"PT Name: Rodolfo Messina  MR #: [id number]    Physician Query Form - Hematology Clarification      CDS/: Negra Melara RN, CCDS              Contact information: [email or web address]    This form is a permanent document in the medical record.      Query Date: May 29, 2018    By submitting this query, we are merely seeking further clarification of documentation. Please utilize your independent clinical judgment when addressing the question(s) below.    The Medical record contains the following:   Indicators  Supporting Clinical Findings Location in Medical Record    "Anemia" documented     X H & H = 11.6/35.6-->9.5/28.5-->7.9/24-->7.3/23.8  -H/H 7.8/23.8, continue to monitor 5/23, 5/25, 5/27, 5/29 lab  5/29  prog note    BP =                     HR=      "GI bleeding" documented      Acute bleeding (Non GI site)      Transfusion(s)     X Treatment: Cbc daily 5/29 prog note   X Other:  Mitral valve replacement.   CABG x1 (LIMA-LAD).   ml 5/25 op note     Provider, please specify diagnosis or diagnoses associated with above clinical findings.    [x  ] Acute blood loss anemia expected post-operatively    [  ] Other Hematological Diagnosis (please specify): _________________________________    [  ] Clinically Undetermined       Please document in your progress notes daily for the duration of treatment, until resolved, and include in your discharge summary.                                                                                                      "

## 2018-05-29 NOTE — HPI
Rodolfo Harris is a 66 yo male with PMH of severe mitral valve regurgitation (2/2 to endocarditis), tricuspid valve regurgitation, and pulmonary hypertension. The patient is admitted to [facility] for treatment of cardiac issues. S/p MVR and single vessel CABG 5/25/18.  Slowly progressing from surgery. Patient noted to have epistaxis following NGT placement. Otolaryngology consulted for evaluation. Bleeding lasted roughly 5-10 minutes from left nostril.  This self resolved by self. The patient is on aspirin and coumadin. Denies sensation of bleeding down throat. No difficulty breathing.

## 2018-05-29 NOTE — PLAN OF CARE
Problem: Patient Care Overview  Goal: Plan of Care Review  Hx:  Mitral regurg (severe), Aortic regurg (mild), Pulm HTN, HTN   Outcome: Ongoing (interventions implemented as appropriate)  Pt remained free of falls throughout shift.  Pt remained on 7.5 Lasix gtt with no issues noted.  O2 turned off during night with no distress noted, pt tolerated well and sats remained above 93%.  New IV placed in RAFA and old IV in the L wrist d/cd.  POC reviewed with pt who verbalized understanding.  Will continue to monitor.

## 2018-05-29 NOTE — CONSULTS
"  [facility]  Adult Nutrition  Consult Note    SUMMARY     Recommendations  Recommendation/Intervention:   1. As medically able, initiate TF of Isosource 1.5 at a goal rate of 50 mL/hr + 2 packets/day Beneprotein - to provide 1875 kcal/day, 94 g protein/day, and 917 mL free fluid/day.   2. As medically appropriate, ADAT to Cardiac with texutre per SLP.   RD to monitor.    Goals: Patient to receive nutrition by RD follow-up  Nutrition Goal Status: new  Communication of RD Recs:  (POC)    Reason for Assessment  Reason for Assessment: consult  Diagnosis: cardiac disease (s/p MVR & CABG 5/25)  Relevant Medical History: mitral valve endocarditis, severe MR, pulmonary HTN, CHF, diverticulosis  General Information Comments: S/p MVR & CABG 5/25. Patient with AMS. SLP recommending NPO, plan for MBSS. RN placed NG, plan to start TF.  Nutrition Discharge Planning: Unable to determine at this time.    Nutrition Risk Screen  Nutrition Risk Screen: no indicators present    Nutrition/Diet History  Do you have any cultural, spiritual, Confucianist conflicts, given your current situation?: none reported  Factors Affecting Nutritional Intake: NPO    Anthropometrics  Temp: 98.3 °F (36.8 °C)  Height Method: Stated  Height: 5' 10" (177.8 cm)  Height (inches): 70 in  Weight Method: Bed Scale  Weight: 68.5 kg (151 lb 0.2 oz)  Weight (lb): 151.02 lb  Ideal Body Weight (IBW), Male: 166 lb  % Ideal Body Weight, Male (lb): 96.82 lb  BMI (Calculated): 23.1  BMI Grade: 18.5-24.9 - normal    Lab/Procedures/Meds  Pertinent Labs Reviewed: reviewed  Pertinent Labs Comments: Na 151, Cl 111, BUN 89, Creat 2.8, Mag 2.9, POCT Glu   Pertinent Medications Reviewed: reviewed  Pertinent Medications Comments: famotidine, coumadin, lasix, cardene, levophed    Physical Findings/Assessment  Overall Physical Appearance: on oxygen therapy  Tubes: nasogastric tube, other (see comments) (chest tube)  Oral/Mouth Cavity: tooth/teeth " missing  Skin: incision(s)    Estimated/Assessed Needs  Weight Used For Calorie Calculations: 65.4 kg (144 lb 2.9 oz) (dosing weight)  Energy Calorie Requirements (kcal): 1794 kcal/day  Energy Need Method: Buckhannon-St Jeor (x 1.25)  Protein Requirements: 79-92 g/day (1.2-1.4 g/kg)  Weight Used For Protein Calculations: 65.4 kg (144 lb 2.9 oz) (dosing weight)  Fluid Requirements (mL): 1 mL/kcal or per MD  Fluid Need Method: RDA Method  RDA Method (mL): 1794    Nutrition Prescription Ordered  Current Diet Order: NPO  Current Nutrition Support Formula Ordered: Impact Peptide 1.5  Current Nutrition Support Rate Ordered: 50 (ml)  Current Nutrition Support Frequency Ordered: mL/hr  Oral Nutrition Supplement: + 3 packets/day Beneprotein    Evaluation of Received Nutrient/Fluid Intake  Enteral Calories (kcal): 1800  Enteral Protein (gm): 113  Enteral (Free Water) Fluid (mL): 924  Other Calories (kcal): 75 (beneprotein)  Total Calories (kcal): 1875  % Kcal Needs: 105  Other Protein (gm): 18  Total Protein (gm): 131  % Protein Needs: 142  I/O: -1.7L x 24hrs, -6.5L since admit, good UOP  Energy Calories Required: meeting needs  Protein Required: exceed needs  Fluid Required:  (per MD)  Comments: LBM 5/29  Tolerance:  (not started yet)  % Intake of Estimated Energy Needs: 75 - 100 %  % Meal Intake: NPO    Nutrition Risk  Level of Risk/Frequency of Follow-up: high (2x/week)     Assessment and Plan  * S/P MVR (mitral valve replacement)    Contributing Nutrition Diagnosis  Inadequate energy intake    Related to (etiology):   Decreased ability to consume sufficient energy    Signs and Symptoms (as evidenced by):   NPO and no alternative means of nutrition at this time     Nutrition Diagnosis Status:   New          Monitor and Evaluation  Food and Nutrient Intake: energy intake, enteral nutrition intake  Food and Nutrient Adminstration: diet order, enteral and parenteral nutrition administration  Anthropometric Measurements: weight,  weight change, body mass index  Biochemical Data, Medical Tests and Procedures: electrolyte and renal panel, gastrointestinal profile, inflammatory profile  Nutrition-Focused Physical Findings: overall appearance     Nutrition Follow-Up  RD Follow-up?: Yes

## 2018-05-29 NOTE — SUBJECTIVE & OBJECTIVE
Medications:  Continuous Infusions:   furosemide (LASIX) 2 mg/mL infusion (non-titrating) Stopped (05/29/18 1100)    nicardipine Stopped (05/28/18 0900)    norepinephrine bitartrate-D5W Stopped (05/25/18 1510)    propofol Stopped (05/26/18 1910)     Scheduled Meds:   acetaminophen  1,000 mg Intravenous Q6H    amLODIPine  5 mg Oral Daily    aspirin  300 mg Rectal Daily    atorvastatin  40 mg Oral Daily    doxazosin  4 mg Oral Daily    famotidine  20 mg Oral Daily    mupirocin  1 g Nasal BID    polyethylene glycol  17 g Oral Daily    sodium chloride 0.9%  3 mL Intravenous Q8H    theophylline  200 mg Oral Q12H    warfarin  3 mg Oral Daily     PRN Meds:albumin human 5%, albuterol-ipratropium, bisacodyl, hydrALAZINE, lactated ringers, lactulose, magnesium sulfate IVPB, metoclopramide HCl, ondansetron, potassium chloride in water **AND** potassium chloride in water **AND** potassium chloride in water, sodium phosphate IVPB, sodium phosphate IVPB, sodium phosphate IVPB, white petrolatum-mineral oil     No current facility-administered medications on file prior to encounter.      Current Outpatient Prescriptions on File Prior to Encounter   Medication Sig    albuterol-ipratropium 2.5mg-0.5mg/3mL (DUO-NEB) 0.5 mg-3 mg(2.5 mg base)/3 mL nebulizer solution 3 mLs every 4 (four) hours as needed.     aspirin (ECOTRIN) 81 MG EC tablet Take 1 tablet (81 mg total) by mouth once daily.    atorvastatin (LIPITOR) 20 MG tablet Take 2 tablets (40 mg total) by mouth once daily.    benzonatate (TESSALON) 100 MG capsule Take 100 mg by mouth 3 (three) times daily as needed for Cough.    furosemide (LASIX) 40 MG tablet Take 1 tablet (40 mg total) by mouth once daily.    loratadine (CLARITIN) 10 mg tablet Take 10 mg by mouth once daily.    mirtazapine (REMERON) 30 MG tablet Take 1 tablet (30 mg total) by mouth every evening.    pantoprazole (PROTONIX) 40 MG tablet Take 1 tablet (40 mg total) by mouth once daily.     tamsulosin (FLOMAX) 0.4 mg Cp24 Take 2 capsules (0.8 mg total) by mouth 2 (two) times daily.    VENTOLIN HFA 90 mcg/actuation inhaler        Review of patient's allergies indicates:   Allergen Reactions    Shellfish containing products     Augmentin [amoxicillin-pot clavulanate]      Patient felt that it raised BP and requested to have it added as intolerance. Tolerated unasyn    Ciprofloxacin     Flagyl [metronidazole]     Lisinopril Other (See Comments)     cough    Mysoline [primidone]     Omnicef [cefdinir]        Past Medical History:   Diagnosis Date    ANNA (acute kidney injury) 2/22/2018    Anemia     Anxiety     Arthritis     BPH (benign prostatic hyperplasia)     CHF (congestive heart failure) 5/23/2018    Coronary artery disease of native artery of native heart with stable angina pectoris 5/21/2018    Diverticulosis     Encounter for blood transfusion     Hypertension     Urinary retention      Past Surgical History:   Procedure Laterality Date    APPENDECTOMY      COLONOSCOPY      COLONOSCOPY N/A 2/1/2018    Procedure: COLONOSCOPY;  Surgeon: Richar Payan MD;  [facility]  Service: Endoscopy;  Laterality: N/A;  PM prep    CREATION OF AORTOCORONARY ARTERY BYPASS N/A 5/25/2018    Procedure: AORTOCORONARY BYPASS-CABG;  Surgeon: Marvin Go MD;  [facility];  Service: Cardiovascular;  Laterality: N/A;    EYE SURGERY Right     cataract extraction    FINGER SURGERY      FRACTURE SURGERY Right     index finger    MITRAL VALVE REPLACEMENT N/A 5/25/2018    Procedure: Mitral Valve Replacement;  Surgeon: Marvin Go MD;  [facility];  Service: Cardiovascular;  Laterality: N/A;    TONSILLECTOMY       Family History     Problem Relation (Age of Onset)    Heart disease Mother, Father    No Known Problems Sister, Brother    Stroke Mother        Social History Main Topics    Smoking status: Never Smoker    Smokeless tobacco: Never Used     Alcohol use No      Comment: none recently    Drug use: No    Sexual activity: Not Currently     Partners: Female     Review of Systems  Objective:     Vital Signs (Most Recent):  Temp: 98.3 °F (36.8 °C) (05/29/18 1500)  Pulse: (!) 56 (05/29/18 1600)  Resp: (!) 25 (05/29/18 1600)  BP: (!) 145/65 (05/29/18 1600)  SpO2: 96 % (05/29/18 1600) Vital Signs (24h Range):  Temp:  [98 °F (36.7 °C)-98.9 °F (37.2 °C)] 98.3 °F (36.8 °C)  Pulse:  [56-80] 56  Resp:  [15-59] 25  SpO2:  [87 %-98 %] 96 %  BP: (115-146)/(55-65) 145/65  Arterial Line BP: (105-159)/(37-65) 159/57     Weight: 68.5 kg (151 lb 0.2 oz)  Body mass index is 21.67 kg/m².      Date 05/29/18 0700 - 05/30/18 0659   Shift 8770-7409 4021-3848 9543-2396 24 Hour Total   I  N  T  A  K  E   NG/GT  250  250    Shift Total  (mL/kg)  250  (3.6)  250  (3.6)   O  U  T  P  U  T   Urine  (mL/kg/hr) 520  (0.9) 80  600    Shift Total  (mL/kg) 520  (7.6) 80  (1.2)  600  (8.8)   Weight (kg) 68.5 68.5 68.5 68.5       Physical Exam    General: Alert,  Comfortable, no acute distress  Voice: Regular for age, Slightly weak volume.   Respiratory: Symmetric breathing, no stridor, no distress  Head: Normocephalic, no lesions  Face: Symmetric, HB 1/6 bilat, no lesions, no obvious sinus tenderness, salivary glands nontender  Eyes: Sclera white, extraocular movements intact  Nose: NGT in right nostril. Left nostril with old blood stain. No active bleeding.   Right Ear: Pinna and external ear appears normal, EAC patent  Left Ear: Pinna and external ear appears normal, EAC patent  Hearing: Grossly intact  Oral cavity/OP: Old blood stained mucus in OP. Stable small old clot in nasopharynx. No active bleeding. Healthy mucosa, no masses or lesions including lips, gums, floor of mouth, palate, or tongue.  Neck: Supple, no palpable nodes, no masses, trachea midline, no thyroid masses  Cardiovascular system: Sternotomy incision intact.     Significant Labs:  BMP:   Recent Labs  Lab  05/29/18  0350  05/29/18  1743   GLU 96  --   --    *  --   --    CO2 25  --   --    BUN 89*  --   --    CREATININE 2.8*  --   --    CALCIUM 9.2  --   --    MG 2.9*  < > 3.1*   < > = values in this interval not displayed.  CBC:   Recent Labs  Lab 05/29/18  0350   WBC 6.21   RBC 2.54*   HGB 7.8*   HCT 23.8*   PLT 70*   MCV 94   MCH 30.7   MCHC 32.8       Significant Diagnostics:  None

## 2018-05-29 NOTE — SUBJECTIVE & OBJECTIVE
Interval History/Significant Events: EPS recommended SOWMYA/DCCV yesterday but LA thrombus noted on SOWMYA. Will likely need PPM placement. More oriented this am but still with some hallucinations which patient realizes are not real.    Follow-up For: Procedure(s) (LRB):  Mitral Valve Replacement (N/A)  AORTOCORONARY BYPASS-CABG (N/A)    Post-Operative Day: 4 Days Post-Op    Objective:     Vital Signs (Most Recent):  Temp: 98.7 °F (37.1 °C) (05/28/18 2300)  Pulse: 79 (05/29/18 0515)  Resp: (!) 27 (05/29/18 0515)  BP: 132/61 (05/29/18 0500)  SpO2: 96 % (05/29/18 0515) Vital Signs (24h Range):  Temp:  [97.7 °F (36.5 °C)-98.9 °F (37.2 °C)] 98.7 °F (37.1 °C)  Pulse:  [79-80] 79  Resp:  [14-59] 27  SpO2:  [87 %-99 %] 96 %  BP: (105-132)/(51-63) 132/61  Arterial Line BP: ()/(41-98) 149/65     Weight: 69.9 kg (154 lb 1.6 oz)  Body mass index is 22.11 kg/m².      Intake/Output Summary (Last 24 hours) at 05/29/18 0632  Last data filed at 05/29/18 0500   Gross per 24 hour   Intake              605 ml   Output             2555 ml   Net            -1950 ml       Physical Exam   Constitutional: He is oriented to person, place, and time. He appears well-developed.   HENT:   Head: Normocephalic and atraumatic.   Eyes: EOM are normal. Pupils are equal, round, and reactive to light.   Neck: Normal range of motion. Neck supple.   Cardiovascular: Regular rhythm.    Paced at 80   Pulmonary/Chest:   Extubated, on nasal canula    Abdominal: Soft. Bowel sounds are normal. He exhibits no distension. There is no tenderness.   Neurological: He is alert and oriented to person, place, and time.   Per nurse, still having some hallucinations   Skin: Skin is warm and dry.       Vents:  Vent Mode: Spont (05/26/18 2025)  Ventilator Initiated: Yes (05/25/18 1509)  Set Rate: 0 bmp (05/26/18 2025)  Vt Set: 500 mL (05/26/18 2025)  Pressure Support: 10 cmH20 (05/26/18 2025)  PEEP/CPAP: 5 cmH20 (05/26/18 2025)  Oxygen Concentration (%): 24 (05/29/18  0402)  Peak Airway Pressure: 16 cmH2O (05/26/18 2025)  Plateau Pressure: 0 cmH20 (05/26/18 2025)  Total Ve: 11.8 mL (05/26/18 2025)  F/VT Ratio<105 (RSBI): (!) 32.76 (05/26/18 2025)    Lines/Drains/Airways     Peripherally Inserted Central Catheter Line                 PICC Double Lumen 03/05/18 1539 right basilic 84 days          Central Venous Catheter Line                 Percutaneous Central Line Insertion/Assessment - Quad lumen  05/25/18 0800 left subclavian 3 days          Drain                 Chest Tube 05/25/18 1515 Other (Comment) Pleural 3 days         Y Chest Tube 3 and 4 05/25/18 1354 3 Right Pleural 32 Fr. 4 Left Pericardial 32 Fr. 3 days         Urethral Catheter 05/28/18 0245 1 day          Arterial Line                 Arterial Line 05/25/18 0745 Left Brachial 3 days          Line                 Pacer Wires 05/25/18 1355 3 days          Peripheral Intravenous Line                 Peripheral IV - Single Lumen 05/23/18 1700 Left Wrist 5 days         Peripheral IV - Single Lumen 05/27/18 1135 Right Forearm 1 day                Significant Labs:    CBC/Anemia Profile:    Recent Labs  Lab 05/27/18  1534 05/28/18  0303 05/29/18  0350   WBC  --  6.65 6.21   HGB  --  8.2* 7.8*   HCT  --  25.0* 23.8*   PLT 54* 71* 70*   MCV  --  94 94   RDW  --  15.9* 15.9*        Chemistries:    Recent Labs  Lab 05/28/18  0303  05/28/18  1749 05/29/18  0046 05/29/18  0350   *  --   --   --   --    K 4.1  4.1  < > 4.4 4.2 3.8     --   --   --   --    CO2 25  --   --   --   --    BUN 66*  --   --   --   --    CREATININE 2.3*  --   --   --   --    CALCIUM 9.2  --   --   --   --    MG 2.6  < > 2.8* 2.9* 2.9*   < > = values in this interval not displayed.    All pertinent labs within the past 24 hours have been reviewed.    Significant Imaging:  I have reviewed all pertinent imaging results/findings within the past 24 hours.

## 2018-05-29 NOTE — PLAN OF CARE
Problem: Physical Therapy Goal  Goal: Physical Therapy Goal  Goals to be met by: 2018    Patient will increase functional independence with mobility by performin. Supine to sit with Supervision - not met  2. Sit to stand transfer with Supervision - not met  3. Bed to chair transfer with Supervision  - not met  4. Gait  x 250 feet with Supervision - not met  5. Lower extremity exercise program x20 reps per handout, with independence - not met    Outcome: Ongoing (interventions implemented as appropriate)  Eval completed and goals appropriate.

## 2018-05-29 NOTE — PROCEDURES
Modified Barium Swallow    Patient Name:  Rodolfo Messina   MRN:  [id number]      Recommendations:     Recommendations:                General Recommendations:  Dysphagia therapy  Diet recommendations:    IF ABLE TO COMPLETE EACH SWALLOW WITH CHIN TUCK & 3 SWALLOWS PER BOLUS, Puree & nectar thickened liquid diet may be initiated with caution  If unable to complete these strategies with ALL PO intake, SLP recs NPO, temporary alternative means of intake, puree & nectar thick for pleasure with above strategies, dysphagia tx & repeat MBSS at the end of week  Aspiration Precautions: 1 bite/sip at a time, 1:1 supervision with all PO intake to ensure utilizing strategies/precautions, Chin tuck, Eliminate distractions, Feed only when awake/alert, HOB to 90 degrees, Meds crushed in puree  Monitor for s/s of aspiration (coughing, throat clearing, wet vocal quality) & discontinue PO if s/s present  No straws, Remain upright 30 minutes post meal, Small bites/sips and Strict aspiration precautions   SLP paged team, awaiting call back  General Precautions: Standard, aspiration, fall  Communication strategies:  none    Referral     Reason for Referral  Patient was referred for a Modified Barium Swallow Study to assess the efficiency of his/her swallow function, rule out aspiration and make recommendations regarding safe dietary consistencies, effective compensatory strategies, and safe eating environment.     Diagnosis: S/P MVR (mitral valve replacement)       History:     Past Medical History:   Diagnosis Date    ANNA (acute kidney injury) 2/22/2018    Anemia     Anxiety     Arthritis     BPH (benign prostatic hyperplasia)     CHF (congestive heart failure) 5/23/2018    Coronary artery disease of native artery of native heart with stable angina pectoris 5/21/2018    Diverticulosis     Encounter for blood transfusion     Hypertension     Urinary retention        Objective:     Current Respiratory Status: 05/29/18    Alert:  yes    Cooperative: yes    Follows Directions: yes    Visualization  · Patient was seen in the lateral view    Oral Peripheral Examination  · Oral Musculature: WFL  · Dentition: present and adequate  · Secretion Management: adequate  · Mucosal Quality: dry  · Mandibular Strength and Mobility: WFL  · Oral Labial Strength and Mobility: WFL  · Lingual Strength and Mobility: WFL  · Velar Elevation: WFL  · Buccal Strength and Mobility: WFL  · Volitional Cough: strong  · Volitional Swallow: elicited  · Voice Prior to PO Intake: dry, clear, slightly reduced intensity and volume; pt's wife reports baseline tremors which affect voice    Consistencies Assessed  · Thin - small unmeasured cup sip x1 self fed, 5mL via spoon x5  · Nectar thick 5mL via tsp x4, sips via unmeasured cup sips x3 self fed  · Honey thick 5mL via tsp x1  · Puree 5mL via tsp x4    Oral Preparation/Oral Phase  · Decreased base of tongue mobility  · Premature spillage    Pharyngeal Phase   Pharyngeal phase c/b absent epiglottic inversion when head in normal position, decreased epiglottic inversion when in chin tuck position, premature spillage, decreased hyolaryngeal excursion & rise & decreased pharyngeal constriction    Thin: deep penetration of cup sip & tsp sip x1 during swallow without chin tuck  Significant vallecular stasis requiring multiple swallows to clear to min stasis, flash penetration with each dry swallow  Remaining tsp sips x4 with chin tuck - no penetration during initial  swallow yet consistent flash penetration with each dry swallow completed in effort to clear stasis    Nectar, honey & puree:   Without chin tuck, significant vallecular stasis, poor clearance despite multiple dry swallows  With chin tuck- moderate vallecular stasis cleared to mild stasis with 3 swallows  No penetration/aspiration yet pt is at high risk of eventual aspiration if unable to tuck chin for swallows & if unable to complete multiple swallows per bolus with chin  tucked    Cervical Esophageal Phase  · UES appeared to accommodate all bolus types without stasis or retrograde movement observed     Assessment:     Impressions  ·   Patient demonstrates moderate Oropharyngeal dysphagia characterized by impaired epiglottic movement & decreased pharyngeal constriction    Prognosis: Good    Plan  Dysphagia tx    Education  Results were discussed with patient. Med team was maryd, SLP awaiting call back    Goals:    SLP Goals        Problem: SLP Goal    Goal Priority Disciplines Outcome   SLP Goal     SLP    Description:  Speech Language Pathology Goals  Goals expected to be met by 6/4:  1. Pt will participate in ongoing swallowing assessment to determine safest and least restrictive diet.                         Plan:   · Patient to be seen:  Therapy Frequency: 5 x/week   · Plan of Care expires:  06/25/18  · Plan of Care reviewed with:  patient, spouse        Discharge recommendations:  home health speech therapy     Time Tracking:   SLP Treatment Date:   05/29/18  Speech Start Time:  1042  Speech Stop Time:  1112     Speech Total Time (min):  30 min    Virginia Mccall CCC-SLP  05/29/2018   [phone]

## 2018-05-29 NOTE — PT/OT/SLP EVAL
Physical Therapy Evaluation    Patient Name:  Rodolfo Messina   MRN:  [id number]  Co treat with OT    S/p Mitral Valve Replacement; CABG 5/27    Recommendations:     Discharge Recommendations:  home with home health   Discharge Equipment Recommendations: none   Barriers to discharge: None    Assessment:     Rodolfo Messina is a 67 y.o. male admitted with a medical diagnosis of S/P MVR (mitral valve replacement).  He presents with the following impairments/functional limitations:  weakness, impaired endurance, impaired functional mobilty, gait instability, impaired balance, decreased safety awareness. Pt required CGA to stand and min A to ambulate. Pt would benefit from HH upon d/c to maximize functional mobility and ensure safety.    Rehab Prognosis:  good; patient would benefit from acute skilled PT services to address these deficits and reach maximum level of function.      Recent Surgery: Procedure(s) (LRB):  Mitral Valve Replacement (N/A)  AORTOCORONARY BYPASS-CABG (N/A) 4 Days Post-Op    Plan:     During this hospitalization, patient to be seen 5 x/week to address the above listed problems via gait training, therapeutic activities, therapeutic exercises, neuromuscular re-education  · Plan of Care Expires:  06/25/18   Plan of Care Reviewed with: patient, spouse    Subjective     Communicated with RN prior to session and wife present in room.  Patient found in chair upon PT entry to room, agreeable to evaluation.      Chief Complaint: weakness  Patient comments/goals: to get better and return home  Pain/Comfort:  · Pain Rating 1: 0/10  · Pain Rating Post-Intervention 1: 0/10    Patients cultural, spiritual, [employer]al conflicts given the current situation: none reported    Living Environment:  Pt lives with wife and grandson in [facility] and no AMANDA to enter  Prior to admission, patients level of function was indep with ambulation and ADL's.  Patient has the following equipment: none.  DME owned (not currently used):  none.  Upon discharge, patient will have assistance from family.    Objective:     Patient found with: telemetry, pulse ox (continuous), blood pressure cuff, central line, PICC line, peripheral IV, chest tube, lackey catheter, arterial line     General Precautions: Standard, fall   Orthopedic Precautions:N/A   Braces: N/A     Exams:  · Cognitive Exam:    · Follows all commands  · Slightly impulsive with functional mobility  · Per wife, pt has been having hallucinations   · RLE ROM: WFL  · RLE Strength: grossly 4/5  · LLE ROM: WFL  · LLE Strength: grossly 4/5    Functional Mobility:  · Bed Mobility: NT 2nd to pt found in chair   · Transfers:     · Sit to Stand:  contact guard assistance with no AD from chair   · Slight dizziness upon standing that ceased before ambulation   · Gait:   · ~10ft with min A to sink  · Standing at sink to perform ADL's with OT  · ~10ft with min A to chair  · Decreased vitaly, decreased step length, increased B knee flexion during ambulation to chair     AM-PAC 6 CLICK MOBILITY  Total Score:18       Therapeutic Activities and Exercises:  Educated pt on PT role/POC  OT educated pt on sternal precautions  Pt verbalized understanding    Standing at sink x 3 minutes with CGA to comb hair with OT    Patient left up in chair with all lines intact, call button in reach, RN notified and wife present.    GOALS:    Physical Therapy Goals        Problem: Physical Therapy Goal    Goal Priority Disciplines Outcome Goal Variances Interventions   Physical Therapy Goal     PT/OT, PT Ongoing (interventions implemented as appropriate)     Description:  Goals to be met by: 2018    Patient will increase functional independence with mobility by performin. Supine to sit with Supervision - not met  2. Sit to stand transfer with Supervision - not met  3. Bed to chair transfer with Supervision  - not met  4. Gait  x 250 feet with Supervision - not met  5. Lower extremity exercise program x20 reps per  handout, with independence - not met                      History:     Past Medical History:   Diagnosis Date    ANNA (acute kidney injury) 2/22/2018    Anemia     Anxiety     Arthritis     BPH (benign prostatic hyperplasia)     CHF (congestive heart failure) 5/23/2018    Coronary artery disease of native artery of native heart with stable angina pectoris 5/21/2018    Diverticulosis     Encounter for blood transfusion     Hypertension     Urinary retention        Past Surgical History:   Procedure Laterality Date    APPENDECTOMY      COLONOSCOPY      COLONOSCOPY N/A 2/1/2018    Procedure: COLONOSCOPY;  Surgeon: Richar Payan MD;  Location: [facility] (4TH FLR);  Service: Endoscopy;  Laterality: N/A;  PM prep    CREATION OF AORTOCORONARY ARTERY BYPASS N/A 5/25/2018    Procedure: AORTOCORONARY BYPASS-CABG;  Surgeon: Marvin Go MD;  Location: [facility] OR [address] [place] [address];  Service: Cardiovascular;  Laterality: N/A;    EYE SURGERY Right     cataract extraction    FINGER SURGERY      FRACTURE SURGERY Right     index finger    MITRAL VALVE REPLACEMENT N/A 5/25/2018    Procedure: Mitral Valve Replacement;  Surgeon: Marvin Go MD;  Location: [facility] OR [address] [place] [address];  Service: Cardiovascular;  Laterality: N/A;    TONSILLECTOMY         Time Tracking:     PT Received On: 05/29/18  PT Start Time: 0911     PT Stop Time: 0930  PT Total Time (min): 19 min     Billable Minutes: Evaluation 10 and Gait Training 8      Eneida Berrios, PT, DPT  5/29/2018  [phone]

## 2018-05-29 NOTE — PROGRESS NOTES
[facility]  Cardiothoracic Surgery  Progress Note    Patient Name: Rodolfo Messina  MRN: [id number]  Admission Date: 5/23/2018  Hospital Length of Stay: 6 days  Code Status: Full Code   Attending Physician: Marvin Go MD   Referring Provider: Marvin Go MD  Principal Problem:S/P MVR (mitral valve replacement)    Subjective:     Post-Op Info:  Procedure(s) (LRB):  Mitral Valve Replacement (N/A)  AORTOCORONARY BYPASS-CABG (N/A)   4 Days Post-Op     Interval History: No acute events overnight. Doing well this am. Off pressors/inotrops.    Medications:  Continuous Infusions:   furosemide (LASIX) 2 mg/mL infusion (non-titrating) 7.5 mg/hr (05/29/18 0800)    nicardipine Stopped (05/28/18 0900)    norepinephrine bitartrate-D5W Stopped (05/25/18 1510)    propofol Stopped (05/26/18 1910)     Scheduled Meds:   acetaminophen  1,000 mg Intravenous Q6H    amLODIPine  5 mg Oral Daily    aspirin  300 mg Rectal Daily    atorvastatin  40 mg Oral Daily    doxazosin  4 mg Oral Daily    famotidine  20 mg Oral Daily    mupirocin  1 g Nasal BID    polyethylene glycol  17 g Oral Daily    sodium chloride 0.9%  3 mL Intravenous Q8H    warfarin  3 mg Oral Daily     PRN Meds:albumin human 5%, albuterol-ipratropium, bisacodyl, hydrALAZINE, lactated ringers, lactulose, magnesium sulfate IVPB, metoclopramide HCl, ondansetron, potassium chloride in water **AND** potassium chloride in water **AND** potassium chloride in water, sodium phosphate IVPB, sodium phosphate IVPB, sodium phosphate IVPB, white petrolatum-mineral oil     Objective:     Vital Signs (Most Recent):  Temp: 98 °F (36.7 °C) (05/29/18 0700)  Pulse: 64 (05/29/18 0800)  Resp: (!) 53 (05/29/18 0800)  BP: 134/60 (05/29/18 0800)  SpO2: 96 % (05/29/18 0800) Vital Signs (24h Range):  Temp:  [97.7 °F (36.5 °C)-98.9 °F (37.2 °C)] 98 °F (36.7 °C)  Pulse:  [64-80] 64  Resp:  [14-59] 53  SpO2:  [87 %-98 %] 96 %  BP: (105-134)/(51-63) 134/60  Arterial  Line BP: ()/(41-98) 139/51     Weight: 68.5 kg (151 lb 0.2 oz)  Body mass index is 21.67 kg/m².    SpO2: 96 %  O2 Device (Oxygen Therapy): nasal cannula    Intake/Output - Last 3 Shifts       05/27 0700 - 05/28 0659 05/28 0700 - 05/29 0659 05/29 0700 - 05/30 0659    I.V. (mL/kg) 494.9 (7.1) 1121 (16.4)     Total Intake(mL/kg) 494.9 (7.1) 1121 (16.4)     Urine (mL/kg/hr) 4575 (2.7) 2835 (1.7) 140 (1.3)    Chest Tube 270 (0.2) 70 (0)     Total Output 4845 2905 140    Net -4350.2 -1784 -140           Urine Occurrence 1 x      Stool Occurrence  1 x           Lines/Drains/Airways     Peripherally Inserted Central Catheter Line                 PICC Double Lumen 03/05/18 1539 right basilic 84 days          Central Venous Catheter Line                 Percutaneous Central Line Insertion/Assessment - Quad lumen  05/25/18 0800 left subclavian 4 days          Drain                 Chest Tube 05/25/18 1515 Other (Comment) Pleural 3 days         Y Chest Tube 3 and 4 05/25/18 1354 3 Right Pleural 32 Fr. 4 Left Pericardial 32 Fr. 3 days         Urethral Catheter 05/28/18 0245 1 day          Arterial Line                 Arterial Line 05/25/18 0745 Left Brachial 4 days          Line                 Pacer Wires 05/25/18 1355 3 days          Peripheral Intravenous Line                 Peripheral IV - Single Lumen 05/27/18 1135 Right Forearm 1 day                Physical Exam   Constitutional: He appears well-developed.   Cardiovascular:   Paced at 80   Pulmonary/Chest: Effort normal.   Neurological: He is alert.   Skin: Skin is warm and dry.       Significant Labs:  CBC:   Recent Labs  Lab 05/29/18  0350   WBC 6.21   RBC 2.54*   HGB 7.8*   HCT 23.8*   PLT 70*   MCV 94   MCH 30.7   MCHC 32.8     CMP:   Recent Labs  Lab 05/29/18  0350   GLU 96   CALCIUM 9.2   *   K 3.8  3.8   CO2 25   *   BUN 89*   CREATININE 2.8*     Coagulation:   Recent Labs  Lab 05/29/18  0350   INR 1.6*   APTT 37.6*     Assessment/Plan:      Chronic systolic congestive heart failure    - Modified barium swallow today  - Off epi; wean cardene as tolerated for MAP <80  - Daily labs  - Pain meds PRN  - ASA, statin  - D/c beta blocker  - EP following; appreciate recs  - Stepdown            Lupe Hawkins MD  Cardiothoracic Surgery  [facility]

## 2018-05-29 NOTE — ASSESSMENT & PLAN NOTE
68yo M with PMH of mitral valve endocarditis and resultant severe MR, TR and pulmonary HTN who is now s/p IABP placement (5/24) and s/pMVR and CABG 5/25.     Plan:     Neuro:   -Hallucinations but overal mental status improving     Delirium precautions   -Pain control per CTS     Pulmonary:   -Extubated, saturating well on nasal canula, wean as tolerated  -ABGs prn  ABG    Recent Labs  Lab 05/27/18  0822   PH 7.478*   PO2 75*   PCO2 32.1*   HCO3 23.8*   BE 0     -Continuous pulse ox     Cardiac:  -MAP goal >65  -Paced at 80  -Cardene gtt stopped yesterday.  -Amlodipine daily  -Statin daily  -TTE revealing LA thrombus yesterday so cannot perform DCCV at this time  -Coumadin therapy started given clot  -Will likely need PPM, will follow up with EPS  -Continuous cardiac monitoring     Renal:   -UOP adequate 2.5L yesterday on lasix drip  -Flomax daily given home use  -Bun/Cr 66/2.3 (baseline Cr 1.4-1.6), continues to increase     Fluids/Electrolytes/Nutrition/GI:   -Nutritional status: NPO except meds  -Barium swallow ordered  -replace lytes PRN  -Bowel regimen     Hematology/Oncology:  -H/H 7.8/23.8, continue to monitor  -INR/Plts 1.6/15.9  -CBC daily     Infectious Disease:   -Afebrile  -WBC 6  -CBC daily   -Abx: ancef x 5 doses, completed     Endocrine:  - No h/o DM or thyroid dysfunction  - Not currently requiring insulin gtt for glucose control  - Glucose goal of 120-180     Dispo:  -Step down per CTS  -CTS primary

## 2018-05-30 LAB
ALBUMIN SERPL BCP-MCNC: 3 G/DL
ALBUMIN SERPL BCP-MCNC: 3.3 G/DL
ALBUMIN SERPL BCP-MCNC: 3.3 G/DL
ALP SERPL-CCNC: 88 U/L
ALP SERPL-CCNC: 92 U/L
ALP SERPL-CCNC: 92 U/L
ALT SERPL W/O P-5'-P-CCNC: 14 U/L
ALT SERPL W/O P-5'-P-CCNC: 15 U/L
ALT SERPL W/O P-5'-P-CCNC: 15 U/L
ANION GAP SERPL CALC-SCNC: 11 MMOL/L
ANION GAP SERPL CALC-SCNC: 14 MMOL/L
ANION GAP SERPL CALC-SCNC: 8 MMOL/L
ANION GAP SERPL CALC-SCNC: 8 MMOL/L
ANION GAP SERPL CALC-SCNC: 9 MMOL/L
APTT BLDCRRT: 42.4 SEC
AST SERPL-CCNC: 56 U/L
AST SERPL-CCNC: 62 U/L
AST SERPL-CCNC: 62 U/L
BASOPHILS # BLD AUTO: 0.01 K/UL
BASOPHILS NFR BLD: 0.2 %
BILIRUB SERPL-MCNC: 1.7 MG/DL
BILIRUB SERPL-MCNC: 1.9 MG/DL
BILIRUB SERPL-MCNC: 1.9 MG/DL
BUN SERPL-MCNC: 111 MG/DL
BUN SERPL-MCNC: 116 MG/DL
BUN SERPL-MCNC: 119 MG/DL
BUN SERPL-MCNC: 119 MG/DL
BUN SERPL-MCNC: 120 MG/DL
CALCIUM SERPL-MCNC: 10.1 MG/DL
CALCIUM SERPL-MCNC: 10.2 MG/DL
CALCIUM SERPL-MCNC: 9.7 MG/DL
CHLORIDE SERPL-SCNC: 113 MMOL/L
CHLORIDE SERPL-SCNC: 113 MMOL/L
CHLORIDE SERPL-SCNC: 115 MMOL/L
CO2 SERPL-SCNC: 27 MMOL/L
CO2 SERPL-SCNC: 29 MMOL/L
CO2 SERPL-SCNC: 31 MMOL/L
CO2 SERPL-SCNC: 32 MMOL/L
CO2 SERPL-SCNC: 32 MMOL/L
CREAT SERPL-MCNC: 2.5 MG/DL
CREAT SERPL-MCNC: 2.7 MG/DL
CREAT SERPL-MCNC: 2.7 MG/DL
CREAT SERPL-MCNC: 3 MG/DL
CREAT SERPL-MCNC: 3.1 MG/DL
DIFFERENTIAL METHOD: ABNORMAL
EOSINOPHIL # BLD AUTO: 0.3 K/UL
EOSINOPHIL NFR BLD: 5.5 %
ERYTHROCYTE [DISTWIDTH] IN BLOOD BY AUTOMATED COUNT: 16.2 %
EST. GFR  (AFRICAN AMERICAN): 22.8 ML/MIN/1.73 M^2
EST. GFR  (AFRICAN AMERICAN): 23.7 ML/MIN/1.73 M^2
EST. GFR  (AFRICAN AMERICAN): 27 ML/MIN/1.73 M^2
EST. GFR  (AFRICAN AMERICAN): 27 ML/MIN/1.73 M^2
EST. GFR  (AFRICAN AMERICAN): 29.6 ML/MIN/1.73 M^2
EST. GFR  (NON AFRICAN AMERICAN): 19.7 ML/MIN/1.73 M^2
EST. GFR  (NON AFRICAN AMERICAN): 20.5 ML/MIN/1.73 M^2
EST. GFR  (NON AFRICAN AMERICAN): 23.3 ML/MIN/1.73 M^2
EST. GFR  (NON AFRICAN AMERICAN): 23.3 ML/MIN/1.73 M^2
EST. GFR  (NON AFRICAN AMERICAN): 25.6 ML/MIN/1.73 M^2
GLUCOSE SERPL-MCNC: 190 MG/DL
GLUCOSE SERPL-MCNC: 190 MG/DL
GLUCOSE SERPL-MCNC: 197 MG/DL
GLUCOSE SERPL-MCNC: 200 MG/DL
GLUCOSE SERPL-MCNC: 207 MG/DL
HCT VFR BLD AUTO: 22.2 %
HGB BLD-MCNC: 7.4 G/DL
IMM GRANULOCYTES # BLD AUTO: 0.06 K/UL
IMM GRANULOCYTES NFR BLD AUTO: 1.1 %
INR PPP: 2.3
LYMPHOCYTES # BLD AUTO: 0.4 K/UL
LYMPHOCYTES NFR BLD: 7.7 %
MAGNESIUM SERPL-MCNC: 3.2 MG/DL
MAGNESIUM SERPL-MCNC: 3.3 MG/DL
MAGNESIUM SERPL-MCNC: 3.4 MG/DL
MAGNESIUM SERPL-MCNC: 3.4 MG/DL
MAGNESIUM SERPL-MCNC: 3.6 MG/DL
MCH RBC QN AUTO: 31.4 PG
MCHC RBC AUTO-ENTMCNC: 33.3 G/DL
MCV RBC AUTO: 94 FL
MONOCYTES # BLD AUTO: 0.4 K/UL
MONOCYTES NFR BLD: 6.9 %
NEUTROPHILS # BLD AUTO: 4.4 K/UL
NEUTROPHILS NFR BLD: 78.6 %
NRBC BLD-RTO: 0 /100 WBC
PLATELET # BLD AUTO: 78 K/UL
PMV BLD AUTO: 12.7 FL
POCT GLUCOSE: 170 MG/DL (ref 70–110)
POCT GLUCOSE: 220 MG/DL (ref 70–110)
POTASSIUM SERPL-SCNC: 3.8 MMOL/L
POTASSIUM SERPL-SCNC: 4 MMOL/L
POTASSIUM SERPL-SCNC: 4 MMOL/L
POTASSIUM SERPL-SCNC: 4.3 MMOL/L
POTASSIUM SERPL-SCNC: 4.4 MMOL/L
POTASSIUM SERPL-SCNC: 4.5 MMOL/L
POTASSIUM SERPL-SCNC: 4.5 MMOL/L
PROT SERPL-MCNC: 6.3 G/DL
PROT SERPL-MCNC: 6.6 G/DL
PROT SERPL-MCNC: 6.6 G/DL
PROTHROMBIN TIME: 22.1 SEC
RBC # BLD AUTO: 2.36 M/UL
SODIUM SERPL-SCNC: 153 MMOL/L
SODIUM SERPL-SCNC: 154 MMOL/L
SODIUM SERPL-SCNC: 155 MMOL/L
WBC # BLD AUTO: 5.62 K/UL

## 2018-05-30 PROCEDURE — P9045 ALBUMIN (HUMAN), 5%, 250 ML: HCPCS | Mod: JG | Performed by: NURSE PRACTITIONER

## 2018-05-30 PROCEDURE — 93308 TTE F-UP OR LMTD: CPT

## 2018-05-30 PROCEDURE — A4216 STERILE WATER/SALINE, 10 ML: HCPCS | Performed by: THORACIC SURGERY (CARDIOTHORACIC VASCULAR SURGERY)

## 2018-05-30 PROCEDURE — 76937 US GUIDE VASCULAR ACCESS: CPT

## 2018-05-30 PROCEDURE — 92526 ORAL FUNCTION THERAPY: CPT

## 2018-05-30 PROCEDURE — 83735 ASSAY OF MAGNESIUM: CPT | Mod: 91

## 2018-05-30 PROCEDURE — 85610 PROTHROMBIN TIME: CPT

## 2018-05-30 PROCEDURE — 80048 BASIC METABOLIC PNL TOTAL CA: CPT

## 2018-05-30 PROCEDURE — 99232 SBSQ HOSP IP/OBS MODERATE 35: CPT | Mod: ,,, | Performed by: NURSE PRACTITIONER

## 2018-05-30 PROCEDURE — 85025 COMPLETE CBC W/AUTO DIFF WBC: CPT

## 2018-05-30 PROCEDURE — S5010 5% DEXTROSE AND 0.45% SALINE: HCPCS | Performed by: NURSE PRACTITIONER

## 2018-05-30 PROCEDURE — 51798 US URINE CAPACITY MEASURE: CPT

## 2018-05-30 PROCEDURE — 36569 INSJ PICC 5 YR+ W/O IMAGING: CPT

## 2018-05-30 PROCEDURE — 25000003 PHARM REV CODE 250: Performed by: NURSE PRACTITIONER

## 2018-05-30 PROCEDURE — 93005 ELECTROCARDIOGRAM TRACING: CPT

## 2018-05-30 PROCEDURE — 84132 ASSAY OF SERUM POTASSIUM: CPT

## 2018-05-30 PROCEDURE — 36415 COLL VENOUS BLD VENIPUNCTURE: CPT

## 2018-05-30 PROCEDURE — 20000000 HC ICU ROOM

## 2018-05-30 PROCEDURE — 84132 ASSAY OF SERUM POTASSIUM: CPT | Mod: 91

## 2018-05-30 PROCEDURE — 25000003 PHARM REV CODE 250: Performed by: THORACIC SURGERY (CARDIOTHORACIC VASCULAR SURGERY)

## 2018-05-30 PROCEDURE — 63600175 PHARM REV CODE 636 W HCPCS: Performed by: STUDENT IN AN ORGANIZED HEALTH CARE EDUCATION/TRAINING PROGRAM

## 2018-05-30 PROCEDURE — 99232 SBSQ HOSP IP/OBS MODERATE 35: CPT | Mod: GC,,, | Performed by: INTERNAL MEDICINE

## 2018-05-30 PROCEDURE — C1751 CATH, INF, PER/CENT/MIDLINE: HCPCS

## 2018-05-30 PROCEDURE — 99233 SBSQ HOSP IP/OBS HIGH 50: CPT | Mod: ,,, | Performed by: ANESTHESIOLOGY

## 2018-05-30 PROCEDURE — 63600175 PHARM REV CODE 636 W HCPCS: Mod: JG | Performed by: NURSE PRACTITIONER

## 2018-05-30 PROCEDURE — 93306 TTE W/DOPPLER COMPLETE: CPT | Mod: 26,,, | Performed by: INTERNAL MEDICINE

## 2018-05-30 PROCEDURE — 93010 ELECTROCARDIOGRAM REPORT: CPT | Mod: ,,, | Performed by: INTERNAL MEDICINE

## 2018-05-30 PROCEDURE — 83735 ASSAY OF MAGNESIUM: CPT

## 2018-05-30 PROCEDURE — 63600175 PHARM REV CODE 636 W HCPCS: Performed by: NURSE PRACTITIONER

## 2018-05-30 PROCEDURE — 80053 COMPREHEN METABOLIC PANEL: CPT | Mod: 91

## 2018-05-30 PROCEDURE — 25000003 PHARM REV CODE 250: Performed by: STUDENT IN AN ORGANIZED HEALTH CARE EDUCATION/TRAINING PROGRAM

## 2018-05-30 PROCEDURE — 85730 THROMBOPLASTIN TIME PARTIAL: CPT

## 2018-05-30 RX ORDER — FAMOTIDINE 20 MG/1
20 TABLET, FILM COATED ORAL DAILY
Status: DISCONTINUED | OUTPATIENT
Start: 2018-05-31 | End: 2018-06-05

## 2018-05-30 RX ORDER — INSULIN ASPART 100 [IU]/ML
3 INJECTION, SOLUTION INTRAVENOUS; SUBCUTANEOUS
Status: DISCONTINUED | OUTPATIENT
Start: 2018-05-31 | End: 2018-05-30

## 2018-05-30 RX ORDER — INSULIN ASPART 100 [IU]/ML
2 INJECTION, SOLUTION INTRAVENOUS; SUBCUTANEOUS
Status: DISCONTINUED | OUTPATIENT
Start: 2018-05-31 | End: 2018-05-31

## 2018-05-30 RX ORDER — ALBUMIN HUMAN 50 G/1000ML
25 SOLUTION INTRAVENOUS ONCE
Status: COMPLETED | OUTPATIENT
Start: 2018-05-30 | End: 2018-05-30

## 2018-05-30 RX ORDER — AMLODIPINE BESYLATE 10 MG/1
10 TABLET ORAL DAILY
Status: DISCONTINUED | OUTPATIENT
Start: 2018-05-31 | End: 2018-06-08

## 2018-05-30 RX ORDER — DEXTROSE MONOHYDRATE AND SODIUM CHLORIDE 5; .45 G/100ML; G/100ML
INJECTION, SOLUTION INTRAVENOUS CONTINUOUS
Status: DISCONTINUED | OUTPATIENT
Start: 2018-05-30 | End: 2018-05-31

## 2018-05-30 RX ORDER — NAPROXEN SODIUM 220 MG/1
81 TABLET, FILM COATED ORAL DAILY
Status: DISCONTINUED | OUTPATIENT
Start: 2018-05-30 | End: 2018-05-30

## 2018-05-30 RX ORDER — ATORVASTATIN CALCIUM 20 MG/1
40 TABLET, FILM COATED ORAL DAILY
Status: DISCONTINUED | OUTPATIENT
Start: 2018-05-31 | End: 2018-06-08

## 2018-05-30 RX ORDER — DOXAZOSIN 4 MG/1
4 TABLET ORAL DAILY
Status: DISCONTINUED | OUTPATIENT
Start: 2018-05-31 | End: 2018-06-08

## 2018-05-30 RX ORDER — GLUCAGON 1 MG
1 KIT INJECTION
Status: DISCONTINUED | OUTPATIENT
Start: 2018-05-30 | End: 2018-06-09

## 2018-05-30 RX ORDER — POLYETHYLENE GLYCOL 3350 17 G/17G
17 POWDER, FOR SOLUTION ORAL DAILY
Status: DISCONTINUED | OUTPATIENT
Start: 2018-05-31 | End: 2018-06-08

## 2018-05-30 RX ORDER — NAPROXEN SODIUM 220 MG/1
81 TABLET, FILM COATED ORAL DAILY
Status: DISCONTINUED | OUTPATIENT
Start: 2018-05-31 | End: 2018-06-04

## 2018-05-30 RX ORDER — INSULIN ASPART 100 [IU]/ML
2 INJECTION, SOLUTION INTRAVENOUS; SUBCUTANEOUS
Status: DISCONTINUED | OUTPATIENT
Start: 2018-05-30 | End: 2018-05-31

## 2018-05-30 RX ORDER — INSULIN ASPART 100 [IU]/ML
3 INJECTION, SOLUTION INTRAVENOUS; SUBCUTANEOUS
Status: DISCONTINUED | OUTPATIENT
Start: 2018-05-30 | End: 2018-05-30

## 2018-05-30 RX ORDER — AMLODIPINE BESYLATE 10 MG/1
10 TABLET ORAL DAILY
Status: DISCONTINUED | OUTPATIENT
Start: 2018-05-30 | End: 2018-05-30

## 2018-05-30 RX ORDER — INSULIN ASPART 100 [IU]/ML
0-5 INJECTION, SOLUTION INTRAVENOUS; SUBCUTANEOUS EVERY 4 HOURS PRN
Status: DISCONTINUED | OUTPATIENT
Start: 2018-05-30 | End: 2018-06-20 | Stop reason: HOSPADM

## 2018-05-30 RX ADMIN — ASPIRIN 81 MG CHEWABLE TABLET 81 MG: 81 TABLET CHEWABLE at 09:05

## 2018-05-30 RX ADMIN — INSULIN ASPART 2 UNITS: 100 INJECTION, SOLUTION INTRAVENOUS; SUBCUTANEOUS at 08:05

## 2018-05-30 RX ADMIN — ALBUMIN HUMAN 25 G: 0.05 INJECTION, SOLUTION INTRAVENOUS at 03:05

## 2018-05-30 RX ADMIN — HYDRALAZINE HYDROCHLORIDE 20 MG: 20 INJECTION INTRAMUSCULAR; INTRAVENOUS at 03:05

## 2018-05-30 RX ADMIN — MUPIROCIN 1 G: 20 OINTMENT TOPICAL at 09:05

## 2018-05-30 RX ADMIN — FAMOTIDINE 20 MG: 20 TABLET ORAL at 09:05

## 2018-05-30 RX ADMIN — Medication 3 ML: at 06:05

## 2018-05-30 RX ADMIN — DEXTROSE AND SODIUM CHLORIDE: 5; .45 INJECTION, SOLUTION INTRAVENOUS at 02:05

## 2018-05-30 RX ADMIN — ATORVASTATIN CALCIUM 40 MG: 20 TABLET, FILM COATED ORAL at 09:05

## 2018-05-30 RX ADMIN — POLYETHYLENE GLYCOL 3350 17 G: 17 POWDER, FOR SOLUTION ORAL at 09:05

## 2018-05-30 RX ADMIN — DOXAZOSIN MESYLATE 4 MG: 2 TABLET ORAL at 09:05

## 2018-05-30 RX ADMIN — WARFARIN SODIUM 3 MG: 2 TABLET ORAL at 04:05

## 2018-05-30 RX ADMIN — Medication 3 ML: at 10:05

## 2018-05-30 NOTE — PLAN OF CARE
Problem: SLP Goal  Goal: SLP Goal  Speech Language Pathology Goals  Goals expected to be met by 6/4:  1. Pt will participate in ongoing swallowing assessment to determine safest and least restrictive diet.        Outcome: Ongoing (interventions implemented as appropriate)  Pt was seen for ST session.    Virginia Mccall MS, CCC-SLP  Speech Language Pathologist  Pager: [phone]  5/30/2018

## 2018-05-30 NOTE — CONSULTS
Single lumen 18G x 8CM midline placed right basilic vein. Max dwell date 6/28/18, Lot#[id number] .  Needle advanced into the vessel under real time ultrasound guidance.  Image recorded and saved.

## 2018-05-30 NOTE — ASSESSMENT & PLAN NOTE
66yo M with PMH of mitral valve endocarditis and resultant severe MR, TR and pulmonary HTN who is now s/p IABP placement (5/24) and s/pMVR and CABG 5/25.     Plan:     Neuro:   -No issues  -Pain control per CTS     Pulmonary:   -Saturating well on nasal canula, wean as tolerated  -ABGs prn  ABG  -Continuous pulse ox     Cardiac:  -MAP goal >65  -Paced at 80  -Amlodipine daily  -Statin daily  -TTE revealing LA thrombus yesterday so cannot perform DCCV at this time  - EPS thinks amelia function may return in time, but recommended starting theophyline in the meantime (ordered)  -Coumadin therapy started given clot  -ASA  -Continuous cardiac monitoring     Renal:   -UOP adequate 1.7L yesterday, Net +1.1L  -Bun/Cr 112/2.2, down from 111/3.0 yesterday (baseline Cr 1.4-1.6)  -Lasix gtt stopped, MIVF with D51/2NS @ 40cc/hr  -s/p Albumin 500cc given yesterday  -Flomax daily given home use     Fluids/Electrolytes/Nutrition/GI:   -Lasix gtt stopped, MIVF started  -Na up to 157 this am. Free water boluses started @ 200cc QID  -Nutritional status: NPO except meds  -Barium swallow ordered  -TFs started  -replace lytes PRN  -Bowel regimen     Hematology/Oncology:  -H/H 7.5/23.6, continue to monitor  -INR2.1  -CBC daily     Infectious Disease:   -Afebrile  -WBC 6  -CBC daily   -Abx: ancef x 5 doses, completed     Endocrine:  - No h/o DM or thyroid dysfunction  - Endo following for BG, Added novolog 3 units q4h w/ TF and LDSSI  - Glucose goal of 120-180     Dispo:  -CTS primary

## 2018-05-30 NOTE — PROGRESS NOTES
[facility]  Cardiothoracic Surgery  Progress Note    Patient Name: Rodolfo Messina  MRN: [id number]  Admission Date: 5/23/2018  Hospital Length of Stay: 7 days  Code Status: Full Code   Attending Physician: Marvin Go MD   Referring Provider: Marvin Go MD  Principal Problem:S/P MVR (mitral valve replacement)    Subjective:     Post-Op Info:  Procedure(s) (LRB):  Mitral Valve Replacement (N/A)  AORTOCORONARY BYPASS-CABG (N/A)   5 Days Post-Op     Interval History: pt SD from ICU yesterday. pt is confused this AM. Lab redraw revealing ANNA. Transferred back to ICU for closer monitoring. Echo completed to r/o tamponade- negative.       Medications:  Continuous Infusions:   dextrose 5 % and 0.45 % NaCl 40 mL/hr at 05/30/18 1455     Scheduled Meds:   [START ON 5/31/2018] amLODIPine  10 mg Per NG tube Daily    [START ON 5/31/2018] aspirin  81 mg Per NG tube Daily    [START ON 5/31/2018] atorvastatin  40 mg Per NG tube Daily    [START ON 5/31/2018] doxazosin  4 mg Per NG tube Daily    [START ON 5/31/2018] famotidine  20 mg Per NG tube Daily    [START ON 5/31/2018] polyethylene glycol  17 g Per NG tube Daily    sodium chloride 0.9%  3 mL Intravenous Q8H    theophylline  400 mg Oral QHS    warfarin  3 mg Per NG tube Daily     PRN Meds:albumin human 5%, albuterol-ipratropium, bisacodyl, hydrALAZINE, lactated ringers, lactulose, metoclopramide HCl, ondansetron, white petrolatum-mineral oil     Objective:     Vital Signs (Most Recent):  Temp: 98.7 °F (37.1 °C) (05/30/18 1500)  Pulse: 79 (05/30/18 1500)  Resp: (!) 29 (05/30/18 1500)  BP: (!) 144/67 (05/30/18 1445)  SpO2: 95 % (05/30/18 1500) Vital Signs (24h Range):  Temp:  [96.5 °F (35.8 °C)-98.7 °F (37.1 °C)] 98.7 °F (37.1 °C)  Pulse:  [54-84] 79  Resp:  [16-29] 29  SpO2:  [92 %-97 %] 95 %  BP: (131-156)/(61-78) 144/67     Weight: 68.5 kg (151 lb 0.2 oz)  Body mass index is 21.67 kg/m².    SpO2: 95 %  O2 Device (Oxygen Therapy): room  air    Intake/Output - Last 3 Shifts       05/28 0700 - 05/29 0659 05/29 0700 - 05/30 0659 05/30 0700 - 05/31 0659    P.O.  0     I.V. (mL/kg) 1121 (16.4) 88 (1.3)     NG/GT  250 430    Total Intake(mL/kg) 1121 (16.4) 338 (4.9) 430 (6.3)    Urine (mL/kg/hr) 2835 (1.7) 1210 (0.7) 650 (1)    Chest Tube 70 (0)      Total Output 2905 1210 650    Net -1784 -872 -220           Urine Occurrence  1 x     Stool Occurrence 1 x 2 x           Lines/Drains/Airways     Peripherally Inserted Central Catheter Line                 PICC Double Lumen 03/05/18 1539 right basilic 86 days          Central Venous Catheter Line                 Percutaneous Central Line Insertion/Assessment - Quad lumen  05/25/18 0800 left subclavian 5 days          Drain                 NG/OG Tube 05/29/18 1609 nasogastric 16 Fr. Right nostril 1 day         Urethral Catheter 05/30/18 0445 Straight-tip 16 Fr. less than 1 day          Line                 Pacer Wires 05/25/18 1355 5 days          Peripheral Intravenous Line                 Peripheral IV - Single Lumen 05/27/18 1135 Right Forearm 3 days         Peripheral IV - Single Lumen 05/29/18 1734 Right Forearm less than 1 day                Physical Exam   Constitutional: He appears well-developed and well-nourished.   HENT:   Head: Normocephalic and atraumatic.   NG tube in place    Eyes: Pupils are equal, round, and reactive to light.   Neck: Normal range of motion. Neck supple.   Cardiovascular: Normal heart sounds.    Pulmonary/Chest: Effort normal. He has rales.   Abdominal: Soft. Bowel sounds are normal.   Musculoskeletal: Normal range of motion.   Neurological: He is alert.   Skin: Skin is warm and dry.   Psychiatric: He has a normal mood and affect. His behavior is normal.       Significant Labs:  CBC:   Recent Labs  Lab 05/29/18  0350   WBC 6.21   RBC 2.54*   HGB 7.8*   HCT 23.8*   PLT 70*   MCV 94   MCH 30.7   MCHC 32.8     CMP:   Recent Labs  Lab 05/30/18  1013 05/30/18  1015   *  --     CALCIUM 10.2  --    *  --    K 4.0 4.0   CO2 29  --    *  --    *  --    CREATININE 3.0*  --        Significant Diagnostics:  CXR: I have reviewed all pertinent results/findings within the past 24 hours    Assessment/Plan:     * S/P MVR (mitral valve replacement)    --Continue ASA, statin  --Sternal precautions  --PT/OT  --Ambulate x4   --Wean O2 as tolerated for O2 sat >92%  --monitor CXR    --Monitor electrolytes and replace prn  --hold Lasix   --echo completed 5/30 to r/o tamponade- negative    --continue lackey for urinary retention and strict I&Os  --failed MBS study. ST following daily, appreciate recs. Currently receiving TF at 50 ml/hr for nutrition. Keep NPO    DISPO: Discharge planning ongoing          Epistaxis    --Noted to have post traumatic left sided epistaxis following attempted NGT placement. On the time of evaluation patient was hemostatic. No active bleeding noted.     -Given that patient is hemostatic, no acute intervention   -Recommend minimize nasal trauma as best possible  -If requires oxygen, ideal if it is humidified via face tent and not nasal canula  -Bacitracin to bilateral nares nightly  -Afrin q6hrs PRN bleeding (only if ok from Cards prospective)  -Ocean nasal spray q6hrs while awake (starting in a couple days, allow stable clot to form for now)  -If bleeding restarts, hold pressure to fleshy portion of nose (10 mins) and lean forward  -For bleeding that does not stop, page resident  -Remainder of medical management per primary team        Atrial flutter    --EP following appreciate recs.   --holding BB  --external pacer set at 80     ANNA (acute kidney injury)    --Cr bumped to 3.0 today. Holding all diuretics   --500ml Albumin given   --40ml/hr D5W1/2NS infusing   --CMP q6h             Asiya Fernandez NP  Cardiothoracic Surgery  [facility]

## 2018-05-30 NOTE — NURSING
External pacer rate increased from 50 to 80 according to MD order. Patient tolertating well. Will continue to monitor.

## 2018-05-30 NOTE — PT/OT/SLP PROGRESS
"Speech Language Pathology Treatment    Patient Name:  Rodolfo Messina   MRN:  [id number]  Admitting Diagnosis: S/P MVR (mitral valve replacement)    Recommendations:     General Recommendations:  Dysphagia therapy  Diet recommendations:  NPO, Liquid Diet Level: NPO; Once deemed appropriate by SLP & MD, pt may be safe to advance to puree & nectar thickened liquids with specified strategies  Aspiration Precautions: Continue alternate means of nutrition via NGT at this time  Recs following today's session d/w Asiya (NP)  General Precautions: Standard, aspiration, NPO, fall, sternal  Communication strategies:  none    Subjective   Awake yet lethargic, appears increased weakness today. Pt reports "I feel loopy & my speech seems slurred but they say that's normal, I feel weaker than yesterday & I more trouble walking today than I did yesterday." Brother at bedside.     Pain/Comfort:  · Pain Rating 1: 0/10  · Pain Rating Post-Intervention 2: 0/10    Objective:     Has the patient been evaluated by SLP for swallowing?   Yes  Keep patient NPO? Yes   Current Respiratory Status: room air      SLP educated pt & brother at length regarding results of Modified Barium Swallow Study completed yesterday using visual diagram to show anatomy, recs, strategies, risks of aspiration, s/s aspiration, POC, role of SLP, etc. All questions were answered & both verbalized understanding. Pt able to demonstrate chin tuck & dry swallows adequately. Given cup sips of nectar thick x2 & 1 tsp bites of puree x2, pt demonstrated chin tuck & 3 swallows for each bolus. Pt with weak throat clearing following each trial. Dysphagia exercises were introduced & demonstrated. Pt was given list & completed each with good effort & ability.     Assessment:     Rodolfo Messina is a 67 y.o. male with an SLP diagnosis of Dysphagia, risk of aspiration.     Goals:    SLP Goals        Problem: SLP Goal    Goal Priority Disciplines Outcome   SLP Goal     SLP Ongoing " (interventions implemented as appropriate)   Description:  Speech Language Pathology Goals  Goals expected to be met by 6/4:  1. Pt will participate in ongoing swallowing assessment to determine safest and least restrictive diet.                         Plan:     · Patient to be seen:  5 x/week   · Plan of Care expires:  06/25/18  · Plan of Care reviewed with:  patient, sibling   · SLP Follow-Up:  Yes       Discharge recommendations:   (tbd)     Time Tracking:     SLP Treatment Date:   05/30/18  Speech Start Time:  1137  Speech Stop Time:  1205     Speech Total Time (min):  28 min    Billable Minutes: Treatment Swallowing Dysfunction 28    Virginia Mccall CCC-SLP  05/30/2018   [phone]

## 2018-05-30 NOTE — NURSING TRANSFER
Nursing Transfer Note      5/30/2018     Transfer from 302 to [id number]    Transfer via bed    Transfer with telemetry, tube feedings, pacer wires, lackey    Transported by RN and tech    Medicines sent: n/a    Chart send with patient: tubed to unit    Notified: RN

## 2018-05-30 NOTE — ASSESSMENT & PLAN NOTE
--Cr bumped to 3.0 today. Holding all diuretics   --500ml Albumin given   --40ml/hr D5W1/2NS infusing   --CMP q6h

## 2018-05-30 NOTE — PLAN OF CARE
Problem: Patient Care Overview  Goal: Individualization & Mutuality  Dx: CHF  Hx: mitral valve endocarditis and resultant severe MR, TR and pulmonary HTN, EF 36%, diverticulosus, BPH    5/23: admit pre-op  5/24: IABP  5/25: OR for MV replacement, CABGx1  5/26: IABP d/c, 500mL albumin, lasix gtt started. Extubated.  5/27: Cardene restarted, central line d/c      Nursing:  MAP 60-80         Outcome: Ongoing (interventions implemented as appropriate)  Vs & assessment as documented. Pt received from CTSU and admitted to SICU.  AAOx3. Patient on room air, sats %. MAPs maintained 60-80. Heart-rate at 80s paced rhythm. NG tube intact and tube feeds  at 50mL/hr.  Urine output adequate. Received 500ml of albumin over three hours for this shift. All questions answered by RN, will continue to monitor.

## 2018-05-30 NOTE — SUBJECTIVE & OBJECTIVE
Follow-up For: Procedure(s) (LRB):  Mitral Valve Replacement (N/A)  AORTOCORONARY BYPASS-CABG (N/A)    Post-Operative Day: 5 Days Post-Op     Past Medical History:   Diagnosis Date    ANNA (acute kidney injury) 2/22/2018    Anemia     Anxiety     Arthritis     BPH (benign prostatic hyperplasia)     CHF (congestive heart failure) 5/23/2018    Coronary artery disease of native artery of native heart with stable angina pectoris 5/21/2018    Diverticulosis     Encounter for blood transfusion     Hypertension     Urinary retention        Past Surgical History:   Procedure Laterality Date    APPENDECTOMY      COLONOSCOPY      COLONOSCOPY N/A 2/1/2018    Procedure: COLONOSCOPY;  Surgeon: Richar Payan MD;  Location: [facility] (4TH [facility]);  Service: Endoscopy;  Laterality: N/A;  PM prep    CREATION OF AORTOCORONARY ARTERY BYPASS N/A 5/25/2018    Procedure: AORTOCORONARY BYPASS-CABG;  Surgeon: Marvin Go MD;  Location: [facility] OR [address] [place] [address];  Service: Cardiovascular;  Laterality: N/A;    EYE SURGERY Right     cataract extraction    FINGER SURGERY      FRACTURE SURGERY Right     index finger    MITRAL VALVE REPLACEMENT N/A 5/25/2018    Procedure: Mitral Valve Replacement;  Surgeon: Marvin Go MD;  Location: [facility] OR [address] [place] [address];  Service: Cardiovascular;  Laterality: N/A;    TONSILLECTOMY         Review of patient's allergies indicates:   Allergen Reactions    Shellfish containing products     Augmentin [amoxicillin-pot clavulanate]      Patient felt that it raised BP and requested to have it added as intolerance. Tolerated unasyn    Ciprofloxacin     Flagyl [metronidazole]     Lisinopril Other (See Comments)     cough    Mysoline [primidone]     Omnicef [cefdinir]        Family History     Problem Relation (Age of Onset)    Heart disease Mother, Father    No Known Problems Sister, Brother    Stroke Mother        Social History Main Topics    Smoking status: Never Smoker    Smokeless tobacco: Never  Used    Alcohol use No      Comment: none recently    Drug use: No    Sexual activity: Not Currently     Partners: Female      Review of Systems   Constitutional: Negative.    HENT: Negative.    Eyes: Negative.    Respiratory: Negative.    Cardiovascular: Negative.    Gastrointestinal: Negative.    Endocrine: Negative.    Genitourinary: Negative for difficulty urinating.   Musculoskeletal: Negative.    Allergic/Immunologic: Negative.    Neurological: Negative.    Psychiatric/Behavioral: Negative.      Objective:     Vital Signs (Most Recent):  Temp: 98.1 °F (36.7 °C) (05/30/18 1218)  Pulse: 75 (05/30/18 1218)  Resp: 18 (05/30/18 1218)  BP: (!) 152/70 (05/30/18 1218)  SpO2: (!) 92 % (05/30/18 1218) Vital Signs (24h Range):  Temp:  [96.5 °F (35.8 °C)-98.5 °F (36.9 °C)] 98.1 °F (36.7 °C)  Pulse:  [54-84] 75  Resp:  [16-31] 18  SpO2:  [92 %-97 %] 92 %  BP: (131-156)/(61-78) 152/70  Arterial Line BP: (141-159)/(49-57) 159/57     Weight: 68.5 kg (151 lb 0.2 oz)  Body mass index is 21.67 kg/m².      Intake/Output Summary (Last 24 hours) at 05/30/18 1302  Last data filed at 05/30/18 1000   Gross per 24 hour   Intake              488 ml   Output              730 ml   Net             -242 ml       Physical Exam   Constitutional: He is oriented to person, place, and time. He appears well-developed and well-nourished.   HENT:   Head: Normocephalic and atraumatic.   Eyes: EOM are normal. Pupils are equal, round, and reactive to light.   Neck: Normal range of motion.   Cardiovascular:   Paced at 60   Pulmonary/Chest: Effort normal and breath sounds normal.   Abdominal: Soft. Bowel sounds are normal. He exhibits no distension. There is no tenderness.   Neurological: He is alert and oriented to person, place, and time.       Vents:  Vent Mode: Spont (05/26/18 2025)  Ventilator Initiated: Yes (05/25/18 1509)  Set Rate: 0 bmp (05/26/18 2025)  Vt Set: 500 mL (05/26/18 2025)  Pressure Support: 10 cmH20 (05/26/18 2025)  PEEP/CPAP: 5  cmH20 (05/26/18 2025)  Oxygen Concentration (%): 24 (05/29/18 0402)  Peak Airway Pressure: 16 cmH2O (05/26/18 2025)  Plateau Pressure: 0 cmH20 (05/26/18 2025)  Total Ve: 11.8 mL (05/26/18 2025)  F/VT Ratio<105 (RSBI): (!) 32.76 (05/26/18 2025)    Lines/Drains/Airways     Peripherally Inserted Central Catheter Line                 PICC Double Lumen 03/05/18 1539 right basilic 85 days          Central Venous Catheter Line                 Percutaneous Central Line Insertion/Assessment - Quad lumen  05/25/18 0800 left subclavian 5 days          Drain                 NG/OG Tube 05/29/18 1609 nasogastric 16 Fr. Right nostril less than 1 day         Urethral Catheter 05/30/18 0445 Straight-tip 16 Fr. less than 1 day          Line                 Pacer Wires 05/25/18 1355 4 days          Peripheral Intravenous Line                 Peripheral IV - Single Lumen 05/27/18 1135 Right Forearm 3 days         Peripheral IV - Single Lumen 05/29/18 1734 Right Forearm less than 1 day                Significant Labs:    CBC/Anemia Profile:    Recent Labs  Lab 05/29/18  0350   WBC 6.21   HGB 7.8*   HCT 23.8*   PLT 70*   MCV 94   RDW 15.9*        Chemistries:    Recent Labs  Lab 05/29/18  0350  05/30/18  0504 05/30/18  0728 05/30/18  1013 05/30/18  1015   *  --   --  154* 153*  --    K 3.8  3.8  < > 4.3 4.5  4.5 4.0 4.0   *  --   --  113* 113*  --    CO2 25  --   --  27 29  --    BUN 89*  --   --  120* 111*  --    CREATININE 2.8*  --   --  3.1* 3.0*  --    CALCIUM 9.2  --   --  10.1 10.2  --    MG 2.9*  < > 3.4* 3.6*  --  3.4*   < > = values in this interval not displayed.    All pertinent labs within the past 24 hours have been reviewed.    Significant Imaging: I have reviewed all pertinent imaging results/findings within the past 24 hours.

## 2018-05-30 NOTE — SUBJECTIVE & OBJECTIVE
Interval History: pt SD from ICU yesterday. pt is confused this AM. Lab redraw revealing ANNA. Transferred back to ICU for closer monitoring. Echo completed to r/o tamponade- negative.       Medications:  Continuous Infusions:   dextrose 5 % and 0.45 % NaCl 40 mL/hr at 05/30/18 1455     Scheduled Meds:   [START ON 5/31/2018] amLODIPine  10 mg Per NG tube Daily    [START ON 5/31/2018] aspirin  81 mg Per NG tube Daily    [START ON 5/31/2018] atorvastatin  40 mg Per NG tube Daily    [START ON 5/31/2018] doxazosin  4 mg Per NG tube Daily    [START ON 5/31/2018] famotidine  20 mg Per NG tube Daily    [START ON 5/31/2018] polyethylene glycol  17 g Per NG tube Daily    sodium chloride 0.9%  3 mL Intravenous Q8H    theophylline  400 mg Oral QHS    warfarin  3 mg Per NG tube Daily     PRN Meds:albumin human 5%, albuterol-ipratropium, bisacodyl, hydrALAZINE, lactated ringers, lactulose, metoclopramide HCl, ondansetron, white petrolatum-mineral oil     Objective:     Vital Signs (Most Recent):  Temp: 98.7 °F (37.1 °C) (05/30/18 1500)  Pulse: 79 (05/30/18 1500)  Resp: (!) 29 (05/30/18 1500)  BP: (!) 144/67 (05/30/18 1445)  SpO2: 95 % (05/30/18 1500) Vital Signs (24h Range):  Temp:  [96.5 °F (35.8 °C)-98.7 °F (37.1 °C)] 98.7 °F (37.1 °C)  Pulse:  [54-84] 79  Resp:  [16-29] 29  SpO2:  [92 %-97 %] 95 %  BP: (131-156)/(61-78) 144/67     Weight: 68.5 kg (151 lb 0.2 oz)  Body mass index is 21.67 kg/m².    SpO2: 95 %  O2 Device (Oxygen Therapy): room air    Intake/Output - Last 3 Shifts       05/28 0700 - 05/29 0659 05/29 0700 - 05/30 0659 05/30 0700 - 05/31 0659    P.O.  0     I.V. (mL/kg) 1121 (16.4) 88 (1.3)     NG/GT  250 430    Total Intake(mL/kg) 1121 (16.4) 338 (4.9) 430 (6.3)    Urine (mL/kg/hr) 2835 (1.7) 1210 (0.7) 650 (1)    Chest Tube 70 (0)      Total Output 2905 1210 650    Net -1784 -872 -220           Urine Occurrence  1 x     Stool Occurrence 1 x 2 x           Lines/Drains/Airways     Peripherally Inserted  Central Catheter Line                 PICC Double Lumen 03/05/18 1539 right basilic 86 days          Central Venous Catheter Line                 Percutaneous Central Line Insertion/Assessment - Quad lumen  05/25/18 0800 left subclavian 5 days          Drain                 NG/OG Tube 05/29/18 1609 nasogastric 16 Fr. Right nostril 1 day         Urethral Catheter 05/30/18 0445 Straight-tip 16 Fr. less than 1 day          Line                 Pacer Wires 05/25/18 1355 5 days          Peripheral Intravenous Line                 Peripheral IV - Single Lumen 05/27/18 1135 Right Forearm 3 days         Peripheral IV - Single Lumen 05/29/18 1734 Right Forearm less than 1 day                Physical Exam   Constitutional: He appears well-developed and well-nourished.   HENT:   Head: Normocephalic and atraumatic.   NG tube in place    Eyes: Pupils are equal, round, and reactive to light.   Neck: Normal range of motion. Neck supple.   Cardiovascular: Normal heart sounds.    Pulmonary/Chest: Effort normal. He has rales.   Abdominal: Soft. Bowel sounds are normal.   Musculoskeletal: Normal range of motion.   Neurological: He is alert.   Skin: Skin is warm and dry.   Psychiatric: He has a normal mood and affect. His behavior is normal.       Significant Labs:  CBC:   Recent Labs  Lab 05/29/18  0350   WBC 6.21   RBC 2.54*   HGB 7.8*   HCT 23.8*   PLT 70*   MCV 94   MCH 30.7   MCHC 32.8     CMP:   Recent Labs  Lab 05/30/18  1013 05/30/18  1015   *  --    CALCIUM 10.2  --    *  --    K 4.0 4.0   CO2 29  --    *  --    *  --    CREATININE 3.0*  --        Significant Diagnostics:  CXR: I have reviewed all pertinent results/findings within the past 24 hours

## 2018-05-30 NOTE — NURSING
Asiya De La Vega NP notified of the inability to administer ordered amlodipine, albumin, and D5 1/2 NS due to medications not being verified by pharmacy. Will continue to monitor.

## 2018-05-30 NOTE — NURSING
Notified Dr. Brian MD of patient being NPO due to failing barium swallow study today. He had theophylline 400 mg due, but unfortunately these capsules cannot be dressed. Ordered to just hold tonight's dose and continue to monitor patient's HR. Patient's HR is currently paced in the 70's. Will continue to monitor.

## 2018-05-30 NOTE — PROGRESS NOTES
Physical Therapy Discharge Summary    Name: Rodolfo Messina  MRN: [id number]   Principal Problem: S/P MVR (mitral valve replacement)     Patient Discharged from acute Physical Therapy on 2018  .  Please refer to prior PT noted date on 19 for functional status.     Assessment:     Patient transferred to lower level of care secondary to change in medical status    Objective:     GOALS:    Physical Therapy Goals        Problem: Physical Therapy Goal    Goal Priority Disciplines Outcome Goal Variances Interventions   Physical Therapy Goal     PT/OT, PT Ongoing (interventions implemented as appropriate)     Description:  Goals to be met by: 2018    Patient will increase functional independence with mobility by performin. Supine to sit with Supervision - not met  2. Sit to stand transfer with Supervision - not met  3. Bed to chair transfer with Supervision  - not met  4. Gait  x 250 feet with Supervision - not met  5. Lower extremity exercise program x20 reps per handout, with independence - not met                      Reasons for Discontinuation of Therapy Services  Transfer to alternate level of care. and patient transferred to ICU [id number]      Plan:     Patient Discharged to: transfer to ICU. new orders needed.    Janell Huang, PT  2018

## 2018-05-30 NOTE — ASSESSMENT & PLAN NOTE
--Continue ASA, statin  --Sternal precautions  --PT/OT  --Ambulate x4   --Wean O2 as tolerated for O2 sat >92%  --monitor CXR    --Monitor electrolytes and replace prn  --hold Lasix   --echo completed 5/30 to r/o tamponade- negative    --continue lackye for urinary retention and strict I&Os        DISPO: Discharge planning ongoing

## 2018-05-30 NOTE — PROGRESS NOTES
[facility]  Cardiac Electrophysiology  Progress Note    Admission Date: 5/23/2018  Code Status: Full Code   Attending Physician: Marvin Go MD   Expected Discharge Date: 5/31/2018  Principal Problem:S/P MVR (mitral valve replacement)    Subjective:     Interval Hx:  AF with rates 70's      Past Surgical History:   Procedure Laterality Date    APPENDECTOMY      COLONOSCOPY      COLONOSCOPY N/A 2/1/2018    Procedure: COLONOSCOPY;  Surgeon: Richar Payan MD;  Location: [facility] (4TH FLR);  Service: Endoscopy;  Laterality: N/A;  PM prep    CREATION OF AORTOCORONARY ARTERY BYPASS N/A 5/25/2018    Procedure: AORTOCORONARY BYPASS-CABG;  Surgeon: Marvin Go MD;  Location: [facility] OR 2ND FLR;  Service: Cardiovascular;  Laterality: N/A;    EYE SURGERY Right     cataract extraction    FINGER SURGERY      FRACTURE SURGERY Right     index finger    MITRAL VALVE REPLACEMENT N/A 5/25/2018    Procedure: Mitral Valve Replacement;  Surgeon: Marvin Go MD;  Location: [facility] OR [address] [place] [address];  Service: Cardiovascular;  Laterality: N/A;    TONSILLECTOMY         Review of patient's allergies indicates:   Allergen Reactions    Shellfish containing products     Augmentin [amoxicillin-pot clavulanate]      Patient felt that it raised BP and requested to have it added as intolerance. Tolerated unasyn    Ciprofloxacin     Flagyl [metronidazole]     Lisinopril Other (See Comments)     cough    Mysoline [primidone]     Omnicef [cefdinir]        No current facility-administered medications on file prior to encounter.      Current Outpatient Prescriptions on File Prior to Encounter   Medication Sig    albuterol-ipratropium 2.5mg-0.5mg/3mL (DUO-NEB) 0.5 mg-3 mg(2.5 mg base)/3 mL nebulizer solution 3 mLs every 4 (four) hours as needed.     aspirin (ECOTRIN) 81 MG EC tablet Take 1 tablet (81 mg total) by mouth once daily.    atorvastatin (LIPITOR) 20 MG tablet Take 2 tablets (40 mg total) by mouth once  daily.    benzonatate (TESSALON) 100 MG capsule Take 100 mg by mouth 3 (three) times daily as needed for Cough.    furosemide (LASIX) 40 MG tablet Take 1 tablet (40 mg total) by mouth once daily.    loratadine (CLARITIN) 10 mg tablet Take 10 mg by mouth once daily.    mirtazapine (REMERON) 30 MG tablet Take 1 tablet (30 mg total) by mouth every evening.    pantoprazole (PROTONIX) 40 MG tablet Take 1 tablet (40 mg total) by mouth once daily.    tamsulosin (FLOMAX) 0.4 mg Cp24 Take 2 capsules (0.8 mg total) by mouth 2 (two) times daily.    VENTOLIN HFA 90 mcg/actuation inhaler      Family History     Problem Relation (Age of Onset)    Heart disease Mother, Father    No Known Problems Sister, Brother    Stroke Mother        Social History Main Topics    Smoking status: Never Smoker    Smokeless tobacco: Never Used    Alcohol use No      Comment: none recently    Drug use: No    Sexual activity: Not Currently     Partners: Female     Review of Systems   All other systems reviewed and are negative.    Objective:     Vital Signs (Most Recent):  Temp: 98.7 °F (37.1 °C) (05/30/18 1445)  Pulse: 80 (05/30/18 1445)  Resp: 18 (05/30/18 1445)  BP: (!) 144/67 (05/30/18 1445)  SpO2: 95 % (05/30/18 1445) Vital Signs (24h Range):  Temp:  [96.5 °F (35.8 °C)-98.7 °F (37.1 °C)] 98.7 °F (37.1 °C)  Pulse:  [54-84] 80  Resp:  [16-25] 18  SpO2:  [92 %-97 %] 95 %  BP: (131-156)/(61-78) 144/67  Arterial Line BP: (159)/(57) 159/57     Weight: 68.5 kg (151 lb 0.2 oz)  Body mass index is 21.67 kg/m².    SpO2: 95 %  O2 Device (Oxygen Therapy): room air    Physical Exam   Constitutional: He is oriented to person, place, and time. He appears well-developed and well-nourished.   HENT:   Head: Normocephalic and atraumatic.   Eyes: No scleral icterus.   Cardiovascular: Regular rhythm.    Pulmonary/Chest: Effort normal and breath sounds normal. No respiratory distress. He has no wheezes. He has no rales.   Musculoskeletal: He exhibits no  edema.   Neurological: He is alert and oriented to person, place, and time.   Skin: Skin is warm.       Significant Labs: All pertinent lab results from the last 24 hours have been reviewed.      Assessment and Plan:     Atrial flutter    68 y/o M w POD# 5 s/p bioprostethetic mitral valve replacement and CABG x1. Initially with CHB and AF, no longer in heart block but has AF, due to left atrial thrombus will not cardiovert.  Will continue to observe, no plan as of now for pacemaker implantation.    -Hold AV amelia blockers   -Keep epicardial pacer in place   -Agree with coumadin   -Agree with theophyline         Bonifacio Finley MD  Cardiac Electrophysiology  [facility]

## 2018-05-30 NOTE — ASSESSMENT & PLAN NOTE
BG goal 110-140   Add novolog 3 units q4h w/ TF  Hold if TF is on hold  Low dose correction scale.    Please page endocrine NP on call with any BG related issues or concerns. Thanks.       Discharge plans-TBD

## 2018-05-30 NOTE — NURSING
Notified Dr. Brian MD that patient has only voided once since lackey was removed yesterday at 1700. Patient only put out 100 mL of urine on his own this morning. Bladder scanned patient and found that patient is retaining >420 mL of urine. Ordered to place lackey back in. Will place lackey and continue to monitor.

## 2018-05-30 NOTE — SUBJECTIVE & OBJECTIVE
"Interval HPI:   Postprandial excursions noted r/t TF start on 5/29.  Pt is on isosource 1.5, goal 50cc /hr, 2 packets/day beneprotein.  Afebrile.  No hypoglycemia.  NPO.    BP (!) 144/67 (BP Location: Left arm)   Pulse 79   Temp 98.7 °F (37.1 °C) (Oral)   Resp (!) 29   Ht 5' 10" (1.778 m)   Wt 68.5 kg (151 lb 0.2 oz)   SpO2 95%   BMI 21.67 kg/m²     Labs Reviewed and Include      Recent Labs  Lab 05/30/18  1013 05/30/18  1015   *  --    CALCIUM 10.2  --    *  --    K 4.0 4.0   CO2 29  --    *  --    *  --    CREATININE 3.0*  --      Lab Results   Component Value Date    WBC 6.21 05/29/2018    HGB 7.8 (L) 05/29/2018    HCT 23.8 (L) 05/29/2018    MCV 94 05/29/2018    PLT 70 (L) 05/29/2018     No results for input(s): TSH, FREET4 in the last 168 hours.  Lab Results   Component Value Date    HGBA1C 5.4 02/22/2018       Nutritional status:   Body mass index is 21.67 kg/m².  Lab Results   Component Value Date    ALBUMIN 3.4 (L) 05/25/2018    ALBUMIN 3.7 05/25/2018    ALBUMIN 3.3 (L) 05/24/2018     Lab Results   Component Value Date    PREALBUMIN 12 (L) 02/22/2018       Estimated Creatinine Clearance: 23.2 mL/min (A) (based on SCr of 3 mg/dL (H)).    Accu-Checks  Recent Labs      05/27/18   1737  05/27/18   2301  05/28/18   0609   POCTGLUCOSE  93  99  101       Current Medications and/or Treatments Impacting Glycemic Control  Immunotherapy:  Immunosuppressants     None        Steroids:   Hormones     None        Pressors:    Autonomic Drugs     None        Prior low dose correction scale.  Hyperglycemia/Diabetes Medications: Antihyperglycemics     Start     Stop Route Frequency Ordered    05/31/18 1600  insulin aspart U-100 pen 3 Units      -- SubQ Every 24 hours (non-standard times) 05/30/18 1705    05/31/18 1200  insulin aspart U-100 pen 3 Units      -- SubQ Every 24 hours (non-standard times) 05/30/18 1705    05/31/18 0800  insulin aspart U-100 pen 3 Units      -- SubQ Every 24 hours " (non-standard times) 05/30/18 1705    05/31/18 0400  insulin aspart U-100 pen 3 Units      -- SubQ Every 24 hours (non-standard times) 05/30/18 1705    05/31/18 0000  insulin aspart U-100 pen 3 Units      -- SubQ Every 24 hours (non-standard times) 05/30/18 1705    05/30/18 2000  insulin aspart U-100 pen 3 Units      -- SubQ Every 24 hours (non-standard times) 05/30/18 1705    05/30/18 1703  insulin aspart U-100 pen 0-5 Units      -- SubQ Every 4 hours PRN 05/30/18 1705

## 2018-05-30 NOTE — PLAN OF CARE
Problem: Patient Care Overview  Goal: Plan of Care Review  Hx:  Mitral regurg (severe), Aortic regurg (mild), Pulm HTN, HTN   Outcome: Ongoing (interventions implemented as appropriate)  Patient remained free from falls/injury/trauma throughout shift. No complaints of chest pain or SOB. VSS. Patient NPO. NG tube feedings rate at 50 mL/hr. Patient did experience some intermittent confusion and had to be reoriented a few times. Bed alarm set and family is at bedside. Patient only put out 100 mL on his own. Bladder scan revealed >420 mL Lainez catheter placed back in patient this morning. Output was 450 mL. Midsternal incision CDI. Pacer wires connected to external pacemaker. Sternal and fall risk precautions reviewed and maintained. Plan of care reviewed patient. Patient verbalized understanding. All questions encouraged and answered. Will continue to monitor.

## 2018-05-30 NOTE — SUBJECTIVE & OBJECTIVE
Interval Hx:  AF with rates 70's      Past Surgical History:   Procedure Laterality Date    APPENDECTOMY      COLONOSCOPY      COLONOSCOPY N/A 2/1/2018    Procedure: COLONOSCOPY;  Surgeon: Richar Payan MD;  Location: [facility] (4TH FLR);  Service: Endoscopy;  Laterality: N/A;  PM prep    CREATION OF AORTOCORONARY ARTERY BYPASS N/A 5/25/2018    Procedure: AORTOCORONARY BYPASS-CABG;  Surgeon: Marvin Go MD;  [facility];  Service: Cardiovascular;  Laterality: N/A;    EYE SURGERY Right     cataract extraction    FINGER SURGERY      FRACTURE SURGERY Right     index finger    MITRAL VALVE REPLACEMENT N/A 5/25/2018    Procedure: Mitral Valve Replacement;  Surgeon: Marvin Go MD;  [facility];  Service: Cardiovascular;  Laterality: N/A;    TONSILLECTOMY         Review of patient's allergies indicates:   Allergen Reactions    Shellfish containing products     Augmentin [amoxicillin-pot clavulanate]      Patient felt that it raised BP and requested to have it added as intolerance. Tolerated unasyn    Ciprofloxacin     Flagyl [metronidazole]     Lisinopril Other (See Comments)     cough    Mysoline [primidone]     Omnicef [cefdinir]        No current facility-administered medications on file prior to encounter.      Current Outpatient Prescriptions on File Prior to Encounter   Medication Sig    albuterol-ipratropium 2.5mg-0.5mg/3mL (DUO-NEB) 0.5 mg-3 mg(2.5 mg base)/3 mL nebulizer solution 3 mLs every 4 (four) hours as needed.     aspirin (ECOTRIN) 81 MG EC tablet Take 1 tablet (81 mg total) by mouth once daily.    atorvastatin (LIPITOR) 20 MG tablet Take 2 tablets (40 mg total) by mouth once daily.    benzonatate (TESSALON) 100 MG capsule Take 100 mg by mouth 3 (three) times daily as needed for Cough.    furosemide (LASIX) 40 MG tablet Take 1 tablet (40 mg total) by mouth once daily.    loratadine (CLARITIN) 10 mg tablet Take 10 mg by mouth once daily.     mirtazapine (REMERON) 30 MG tablet Take 1 tablet (30 mg total) by mouth every evening.    pantoprazole (PROTONIX) 40 MG tablet Take 1 tablet (40 mg total) by mouth once daily.    tamsulosin (FLOMAX) 0.4 mg Cp24 Take 2 capsules (0.8 mg total) by mouth 2 (two) times daily.    VENTOLIN HFA 90 mcg/actuation inhaler      Family History     Problem Relation (Age of Onset)    Heart disease Mother, Father    No Known Problems Sister, Brother    Stroke Mother        Social History Main Topics    Smoking status: Never Smoker    Smokeless tobacco: Never Used    Alcohol use No      Comment: none recently    Drug use: No    Sexual activity: Not Currently     Partners: Female     Review of Systems   All other systems reviewed and are negative.    Objective:     Vital Signs (Most Recent):  Temp: 98.7 °F (37.1 °C) (05/30/18 1445)  Pulse: 80 (05/30/18 1445)  Resp: 18 (05/30/18 1445)  BP: (!) 144/67 (05/30/18 1445)  SpO2: 95 % (05/30/18 1445) Vital Signs (24h Range):  Temp:  [96.5 °F (35.8 °C)-98.7 °F (37.1 °C)] 98.7 °F (37.1 °C)  Pulse:  [54-84] 80  Resp:  [16-25] 18  SpO2:  [92 %-97 %] 95 %  BP: (131-156)/(61-78) 144/67  Arterial Line BP: (159)/(57) 159/57     Weight: 68.5 kg (151 lb 0.2 oz)  Body mass index is 21.67 kg/m².    SpO2: 95 %  O2 Device (Oxygen Therapy): room air    Physical Exam   Constitutional: He is oriented to person, place, and time. He appears well-developed and well-nourished.   HENT:   Head: Normocephalic and atraumatic.   Eyes: No scleral icterus.   Cardiovascular: Regular rhythm.    Pulmonary/Chest: Effort normal and breath sounds normal. No respiratory distress. He has no wheezes. He has no rales.   Musculoskeletal: He exhibits no edema.   Neurological: He is alert and oriented to person, place, and time.   Skin: Skin is warm.       Significant Labs: All pertinent lab results from the last 24 hours have been reviewed.

## 2018-05-30 NOTE — ASSESSMENT & PLAN NOTE
68 y/o M w POD# 5 s/p bioprostethetic mitral valve replacement and CABG x1. Initially with CHB and AF, no longer in heart block but has AF, due to left atrial thrombus will not cardiovert.  Will continue to observe, no plan as of now for pacemaker implantation.    -Hold AV amelia blockers   -Keep epicardial pacer in place   -Agree with coumadin   -Agree with theophyline

## 2018-05-30 NOTE — ASSESSMENT & PLAN NOTE
--Noted to have post traumatic left sided epistaxis following attempted NGT placement. On the time of evaluation patient was hemostatic. No active bleeding noted.     -Given that patient is hemostatic, no acute intervention   -Recommend minimize nasal trauma as best possible  -If requires oxygen, ideal if it is humidified via face tent and not nasal canula  -Bacitracin to bilateral nares nightly  -Afrin q6hrs PRN bleeding (only if ok from Cards prospective)  -Ocean nasal spray q6hrs while awake (starting in a couple days, allow stable clot to form for now)  -If bleeding restarts, hold pressure to fleshy portion of nose (10 mins) and lean forward  -For bleeding that does not stop, page resident  -Remainder of medical management per primary team

## 2018-05-30 NOTE — CARE UPDATE
Last bg 190 mg/dl per lab, will change novolog scheduled to 2 units q4h w/ low dose correction. Reviewed wt based recommendations.  Anoop Silva, NP  [id number]

## 2018-05-30 NOTE — PROGRESS NOTES
[facility]  Critical Care - Surgery  Progress Note    Patient Name: Rodolfo Messina  MRN: [id number]  Admission Date: 5/23/2018  Hospital Length of Stay: 7 days  Code Status: Full Code  Attending Provider: Marvin Go MD  Primary Care Provider: Deric Claudio MD   Principal Problem: S/P MVR (mitral valve replacement)    Subjective:     Hospital/ICU Course:  No notes on file    Follow-up For: Procedure(s) (LRB):  Mitral Valve Replacement (N/A)  AORTOCORONARY BYPASS-CABG (N/A)    Post-Operative Day: 5 Days Post-Op     Past Medical History:   Diagnosis Date    ANNA (acute kidney injury) 2/22/2018    Anemia     Anxiety     Arthritis     BPH (benign prostatic hyperplasia)     CHF (congestive heart failure) 5/23/2018    Coronary artery disease of native artery of native heart with stable angina pectoris 5/21/2018    Diverticulosis     Encounter for blood transfusion     Hypertension     Urinary retention        Past Surgical History:   Procedure Laterality Date    APPENDECTOMY      COLONOSCOPY      COLONOSCOPY N/A 2/1/2018    Procedure: COLONOSCOPY;  Surgeon: Richar Payan MD;  Location: [facility] (4TH [facility]);  Service: Endoscopy;  Laterality: N/A;  PM prep    CREATION OF AORTOCORONARY ARTERY BYPASS N/A 5/25/2018    Procedure: AORTOCORONARY BYPASS-CABG;  Surgeon: Marvin Go MD;  Location: [facility] OR [address] [place] [address];  Service: Cardiovascular;  Laterality: N/A;    EYE SURGERY Right     cataract extraction    FINGER SURGERY      FRACTURE SURGERY Right     index finger    MITRAL VALVE REPLACEMENT N/A 5/25/2018    Procedure: Mitral Valve Replacement;  Surgeon: Marvin Go MD;  Location: [facility] OR [address] [place] [address];  Service: Cardiovascular;  Laterality: N/A;    TONSILLECTOMY         Review of patient's allergies indicates:   Allergen Reactions    Shellfish containing products     Augmentin [amoxicillin-pot clavulanate]      Patient felt that it raised BP and requested to have it added as  intolerance. Tolerated unasyn    Ciprofloxacin     Flagyl [metronidazole]     Lisinopril Other (See Comments)     cough    Mysoline [primidone]     Omnicef [cefdinir]        Family History     Problem Relation (Age of Onset)    Heart disease Mother, Father    No Known Problems Sister, Brother    Stroke Mother        Social History Main Topics    Smoking status: Never Smoker    Smokeless tobacco: Never Used    Alcohol use No      Comment: none recently    Drug use: No    Sexual activity: Not Currently     Partners: Female      Review of Systems   Constitutional: Negative.    HENT: Negative.    Eyes: Negative.    Respiratory: Negative.    Cardiovascular: Negative.    Gastrointestinal: Negative.    Endocrine: Negative.    Genitourinary: Negative for difficulty urinating.   Musculoskeletal: Negative.    Allergic/Immunologic: Negative.    Neurological: Negative.    Psychiatric/Behavioral: Negative.      Objective:     Vital Signs (Most Recent):  Temp: 98.1 °F (36.7 °C) (05/30/18 1218)  Pulse: 75 (05/30/18 1218)  Resp: 18 (05/30/18 1218)  BP: (!) 152/70 (05/30/18 1218)  SpO2: (!) 92 % (05/30/18 1218) Vital Signs (24h Range):  Temp:  [96.5 °F (35.8 °C)-98.5 °F (36.9 °C)] 98.1 °F (36.7 °C)  Pulse:  [54-84] 75  Resp:  [16-31] 18  SpO2:  [92 %-97 %] 92 %  BP: (131-156)/(61-78) 152/70  Arterial Line BP: (141-159)/(49-57) 159/57     Weight: 68.5 kg (151 lb 0.2 oz)  Body mass index is 21.67 kg/m².      Intake/Output Summary (Last 24 hours) at 05/30/18 1302  Last data filed at 05/30/18 1000   Gross per 24 hour   Intake              488 ml   Output              730 ml   Net             -242 ml       Physical Exam   Constitutional: He is oriented to person, place, and time. He appears well-developed and well-nourished.   HENT:   Head: Normocephalic and atraumatic.   Eyes: EOM are normal. Pupils are equal, round, and reactive to light.   Neck: Normal range of motion.   Cardiovascular:   Paced at 60   Pulmonary/Chest:  Effort normal and breath sounds normal.   Abdominal: Soft. Bowel sounds are normal. He exhibits no distension. There is no tenderness.   Neurological: He is alert and oriented to person, place, and time.       Vents:  Vent Mode: Spont (05/26/18 2025)  Ventilator Initiated: Yes (05/25/18 1509)  Set Rate: 0 bmp (05/26/18 2025)  Vt Set: 500 mL (05/26/18 2025)  Pressure Support: 10 cmH20 (05/26/18 2025)  PEEP/CPAP: 5 cmH20 (05/26/18 2025)  Oxygen Concentration (%): 24 (05/29/18 0402)  Peak Airway Pressure: 16 cmH2O (05/26/18 2025)  Plateau Pressure: 0 cmH20 (05/26/18 2025)  Total Ve: 11.8 mL (05/26/18 2025)  F/VT Ratio<105 (RSBI): (!) 32.76 (05/26/18 2025)    Lines/Drains/Airways     Peripherally Inserted Central Catheter Line                 PICC Double Lumen 03/05/18 1539 right basilic 85 days          Central Venous Catheter Line                 Percutaneous Central Line Insertion/Assessment - Quad lumen  05/25/18 0800 left subclavian 5 days          Drain                 NG/OG Tube 05/29/18 1609 nasogastric 16 Fr. Right nostril less than 1 day         Urethral Catheter 05/30/18 0445 Straight-tip 16 Fr. less than 1 day          Line                 Pacer Wires 05/25/18 1355 4 days          Peripheral Intravenous Line                 Peripheral IV - Single Lumen 05/27/18 1135 Right Forearm 3 days         Peripheral IV - Single Lumen 05/29/18 1734 Right Forearm less than 1 day                Significant Labs:    CBC/Anemia Profile:    Recent Labs  Lab 05/29/18  0350   WBC 6.21   HGB 7.8*   HCT 23.8*   PLT 70*   MCV 94   RDW 15.9*        Chemistries:    Recent Labs  Lab 05/29/18  0350  05/30/18  0504 05/30/18  0728 05/30/18  1013 05/30/18  1015   *  --   --  154* 153*  --    K 3.8  3.8  < > 4.3 4.5  4.5 4.0 4.0   *  --   --  113* 113*  --    CO2 25  --   --  27 29  --    BUN 89*  --   --  120* 111*  --    CREATININE 2.8*  --   --  3.1* 3.0*  --    CALCIUM 9.2  --   --  10.1 10.2  --    MG 2.9*  < > 3.4*  3.6*  --  3.4*   < > = values in this interval not displayed.    All pertinent labs within the past 24 hours have been reviewed.    Significant Imaging: I have reviewed all pertinent imaging results/findings within the past 24 hours.    Assessment/Plan:     Chronic systolic congestive heart failure     68yo M with PMH of mitral valve endocarditis and resultant severe MR, TR and pulmonary HTN who is now s/p IABP placement (5/24) and s/pMVR and CABG 5/25.     Plan:     Neuro:   -No issues  -Pain control per CTS     Pulmonary:   -Saturating well on nasal canula, wean as tolerated  -ABGs prn  ABG  -Continuous pulse ox     Cardiac:  -MAP goal >65  -Paced at 80  -Amlodipine daily  -Statin daily  -TTE revealing LA thrombus yesterday so cannot perform DCCV at this time  - EPS thinks amelia function may return in time, but recommended starting theophyline in the meantime (ordered)  -Coumadin therapy started given clot  -ASA  -Continuous cardiac monitoring     Renal:   -UOP adequate 1.2L yesterday  -Bun/Cr 111/3.0 (baseline Cr 1.4-1.6), continues to increase  -Lasix gtt stopped, MIVF with D51/2NS @ 40cc/hr  -Albumin 500cc given today  -Flomax daily given home use     Fluids/Electrolytes/Nutrition/GI:   -Lasix gtt stopped, MIVF started  -Nutritional status: NPO except meds  -Barium swallow ordered  -replace lytes PRN  -Bowel regimen     Hematology/Oncology:  -H/H 7.8/23.8, continue to monitor  -INR2.3  -CBC daily     Infectious Disease:   -Afebrile  -WBC 6  -CBC daily   -Abx: ancef x 5 doses, completed     Endocrine:  - No h/o DM or thyroid dysfunction  - Not currently requiring insulin gtt for glucose control  - Glucose goal of 120-180     Dispo:  -CTS primary                       Critical care was time spent personally by me on the following activities: development of treatment plan with patient or surrogate and bedside caregivers, discussions with consultants, evaluation of patient's response to treatment, examination of  patient, ordering and performing treatments and interventions, ordering and review of laboratory studies, ordering and review of radiographic studies, pulse oximetry, re-evaluation of patient's condition.  This critical care time did not overlap with that of any other provider or involve time for any procedures.     Ludmila Santana MD  Critical Care - Surgery  [facility]

## 2018-05-30 NOTE — PROGRESS NOTES
"[facility]  Endocrinology  Progress Note    Admit Date: 5/23/2018     Reason for Consult: Management of  Hyperglycemia     Surgical Procedure and Date: s/p CABG x 1, MVR  5/25/18    HPI: Patient is a 67 y.o. male with a diagnosis of mitral valve endocarditis and resultant severe MR, TR and pulmonary HTN. The etiology of his endocarditis remains unknown. He was admitted to the SICU pre-operatively for IABP placement 5/23/18. Underwent cardiac surgery 5/25/18.   No personal or family hx of DM  Lab Results   Component Value Date    HGBA1C 5.4 02/22/2018     Endocrine consulted for BG mgmt.             Interval HPI:   Postprandial excursions noted r/t TF start on 5/29.  Pt is on isosource 1.5, goal 50cc /hr, 2 packets/day beneprotein.  Afebrile.  No hypoglycemia.  NPO.    BP (!) 144/67 (BP Location: Left arm)   Pulse 79   Temp 98.7 °F (37.1 °C) (Oral)   Resp (!) 29   Ht 5' 10" (1.778 m)   Wt 68.5 kg (151 lb 0.2 oz)   SpO2 95%   BMI 21.67 kg/m²       Labs Reviewed and Include      Recent Labs  Lab 05/30/18  1013 05/30/18  1015   *  --    CALCIUM 10.2  --    *  --    K 4.0 4.0   CO2 29  --    *  --    *  --    CREATININE 3.0*  --      Lab Results   Component Value Date    WBC 6.21 05/29/2018    HGB 7.8 (L) 05/29/2018    HCT 23.8 (L) 05/29/2018    MCV 94 05/29/2018    PLT 70 (L) 05/29/2018     No results for input(s): TSH, FREET4 in the last 168 hours.  Lab Results   Component Value Date    HGBA1C 5.4 02/22/2018       Nutritional status:   Body mass index is 21.67 kg/m².  Lab Results   Component Value Date    ALBUMIN 3.4 (L) 05/25/2018    ALBUMIN 3.7 05/25/2018    ALBUMIN 3.3 (L) 05/24/2018     Lab Results   Component Value Date    PREALBUMIN 12 (L) 02/22/2018       Estimated Creatinine Clearance: 23.2 mL/min (A) (based on SCr of 3 mg/dL (H)).    Accu-Checks  Recent Labs      05/27/18   1737  05/27/18   2301  05/28/18   0609   POCTGLUCOSE  93  99  101       Current " Medications and/or Treatments Impacting Glycemic Control  Immunotherapy:  Immunosuppressants     None        Steroids:   Hormones     None        Pressors:    Autonomic Drugs     None        Prior low dose correction scale.  Hyperglycemia/Diabetes Medications: Antihyperglycemics     Start     Stop Route Frequency Ordered    05/31/18 1600  insulin aspart U-100 pen 3 Units      -- SubQ Every 24 hours (non-standard times) 05/30/18 1705    05/31/18 1200  insulin aspart U-100 pen 3 Units      -- SubQ Every 24 hours (non-standard times) 05/30/18 1705    05/31/18 0800  insulin aspart U-100 pen 3 Units      -- SubQ Every 24 hours (non-standard times) 05/30/18 1705    05/31/18 0400  insulin aspart U-100 pen 3 Units      -- SubQ Every 24 hours (non-standard times) 05/30/18 1705    05/31/18 0000  insulin aspart U-100 pen 3 Units      -- SubQ Every 24 hours (non-standard times) 05/30/18 1705    05/30/18 2000  insulin aspart U-100 pen 3 Units      -- SubQ Every 24 hours (non-standard times) 05/30/18 1705    05/30/18 1703  insulin aspart U-100 pen 0-5 Units      -- SubQ Every 4 hours PRN 05/30/18 1705          ASSESSMENT and PLAN    * S/P MVR (mitral valve replacement)    Per CTS  avoid hypoglycemia          Acute hyperglycemia    BG goal 110-140   Add novolog 3 units q4h w/ TF  Hold if TF is on hold  Low dose correction scale.    Please page endocrine NP on call with any BG related issues or concerns. Thanks.       Discharge plans-TBD             S/P CABG x 1    Per CTS  avoid hypoglycemia          Chronic systolic congestive heart failure    Per CTS  avoid hypoglycemia            Coronary artery disease of native artery of native heart with stable angina pectoris    Per CTS  avoid hypoglycemia              Anoop Silva, APRN, FNP  Endocrinology  [facility]

## 2018-05-31 LAB
ALBUMIN SERPL BCP-MCNC: 3 G/DL
ALBUMIN SERPL BCP-MCNC: 3.1 G/DL
ALBUMIN SERPL BCP-MCNC: 3.3 G/DL
ALP SERPL-CCNC: 100 U/L
ALP SERPL-CCNC: 111 U/L
ALP SERPL-CCNC: 115 U/L
ALT SERPL W/O P-5'-P-CCNC: 15 U/L
ALT SERPL W/O P-5'-P-CCNC: 17 U/L
ALT SERPL W/O P-5'-P-CCNC: 19 U/L
ANION GAP SERPL CALC-SCNC: 10 MMOL/L
ANION GAP SERPL CALC-SCNC: 8 MMOL/L
ANION GAP SERPL CALC-SCNC: 9 MMOL/L
APTT BLDCRRT: 39.4 SEC
AST SERPL-CCNC: 50 U/L
AST SERPL-CCNC: 52 U/L
AST SERPL-CCNC: 54 U/L
BASOPHILS # BLD AUTO: 0.01 K/UL
BASOPHILS NFR BLD: 0.1 %
BILIRUB SERPL-MCNC: 1.7 MG/DL
BUN SERPL-MCNC: 110 MG/DL
BUN SERPL-MCNC: 112 MG/DL
BUN SERPL-MCNC: 119 MG/DL
CALCIUM SERPL-MCNC: 10 MG/DL
CALCIUM SERPL-MCNC: 10.2 MG/DL
CALCIUM SERPL-MCNC: 9.9 MG/DL
CHLORIDE SERPL-SCNC: 117 MMOL/L
CO2 SERPL-SCNC: 30 MMOL/L
CO2 SERPL-SCNC: 30 MMOL/L
CO2 SERPL-SCNC: 32 MMOL/L
CREAT SERPL-MCNC: 2.1 MG/DL
CREAT SERPL-MCNC: 2.1 MG/DL
CREAT SERPL-MCNC: 2.2 MG/DL
DIFFERENTIAL METHOD: ABNORMAL
EOSINOPHIL # BLD AUTO: 0.5 K/UL
EOSINOPHIL NFR BLD: 7.4 %
ERYTHROCYTE [DISTWIDTH] IN BLOOD BY AUTOMATED COUNT: 16.4 %
EST. GFR  (AFRICAN AMERICAN): 34.6 ML/MIN/1.73 M^2
EST. GFR  (AFRICAN AMERICAN): 36.6 ML/MIN/1.73 M^2
EST. GFR  (AFRICAN AMERICAN): 36.6 ML/MIN/1.73 M^2
EST. GFR  (NON AFRICAN AMERICAN): 29.9 ML/MIN/1.73 M^2
EST. GFR  (NON AFRICAN AMERICAN): 31.6 ML/MIN/1.73 M^2
EST. GFR  (NON AFRICAN AMERICAN): 31.6 ML/MIN/1.73 M^2
GLUCOSE SERPL-MCNC: 197 MG/DL
GLUCOSE SERPL-MCNC: 198 MG/DL
GLUCOSE SERPL-MCNC: 213 MG/DL
HCT VFR BLD AUTO: 23.6 %
HGB BLD-MCNC: 7.5 G/DL
IMM GRANULOCYTES # BLD AUTO: 0.05 K/UL
IMM GRANULOCYTES NFR BLD AUTO: 0.7 %
INR PPP: 2.1
LYMPHOCYTES # BLD AUTO: 0.5 K/UL
LYMPHOCYTES NFR BLD: 7.9 %
MAGNESIUM SERPL-MCNC: 3 MG/DL
MAGNESIUM SERPL-MCNC: 3.1 MG/DL
MCH RBC QN AUTO: 30.4 PG
MCHC RBC AUTO-ENTMCNC: 31.8 G/DL
MCV RBC AUTO: 96 FL
MONOCYTES # BLD AUTO: 0.5 K/UL
MONOCYTES NFR BLD: 7 %
NEUTROPHILS # BLD AUTO: 5.3 K/UL
NEUTROPHILS NFR BLD: 76.9 %
NRBC BLD-RTO: 0 /100 WBC
PHOSPHATE SERPL-MCNC: 2.4 MG/DL
PLATELET # BLD AUTO: 82 K/UL
PMV BLD AUTO: 12.6 FL
POCT GLUCOSE: 192 MG/DL (ref 70–110)
POCT GLUCOSE: 197 MG/DL (ref 70–110)
POCT GLUCOSE: 205 MG/DL (ref 70–110)
POCT GLUCOSE: 212 MG/DL (ref 70–110)
POCT GLUCOSE: 229 MG/DL (ref 70–110)
POCT GLUCOSE: 232 MG/DL (ref 70–110)
POTASSIUM SERPL-SCNC: 3.9 MMOL/L
POTASSIUM SERPL-SCNC: 4 MMOL/L
POTASSIUM SERPL-SCNC: 4.2 MMOL/L
PROT SERPL-MCNC: 6.6 G/DL
PROT SERPL-MCNC: 6.8 G/DL
PROT SERPL-MCNC: 7 G/DL
PROTHROMBIN TIME: 20.1 SEC
RBC # BLD AUTO: 2.47 M/UL
SODIUM SERPL-SCNC: 155 MMOL/L
SODIUM SERPL-SCNC: 156 MMOL/L
SODIUM SERPL-SCNC: 157 MMOL/L
WBC # BLD AUTO: 6.86 K/UL

## 2018-05-31 PROCEDURE — 80053 COMPREHEN METABOLIC PANEL: CPT | Mod: 91

## 2018-05-31 PROCEDURE — 84100 ASSAY OF PHOSPHORUS: CPT

## 2018-05-31 PROCEDURE — 99233 SBSQ HOSP IP/OBS HIGH 50: CPT | Mod: ,,, | Performed by: ANESTHESIOLOGY

## 2018-05-31 PROCEDURE — 25000003 PHARM REV CODE 250: Performed by: STUDENT IN AN ORGANIZED HEALTH CARE EDUCATION/TRAINING PROGRAM

## 2018-05-31 PROCEDURE — 92610 EVALUATE SWALLOWING FUNCTION: CPT

## 2018-05-31 PROCEDURE — G8996 SWALLOW CURRENT STATUS: HCPCS | Mod: CM

## 2018-05-31 PROCEDURE — 25000003 PHARM REV CODE 250: Performed by: THORACIC SURGERY (CARDIOTHORACIC VASCULAR SURGERY)

## 2018-05-31 PROCEDURE — 25000003 PHARM REV CODE 250: Performed by: NURSE PRACTITIONER

## 2018-05-31 PROCEDURE — 97164 PT RE-EVAL EST PLAN CARE: CPT

## 2018-05-31 PROCEDURE — 20000000 HC ICU ROOM

## 2018-05-31 PROCEDURE — G8997 SWALLOW GOAL STATUS: HCPCS | Mod: CJ

## 2018-05-31 PROCEDURE — 84295 ASSAY OF SERUM SODIUM: CPT

## 2018-05-31 PROCEDURE — 94799 UNLISTED PULMONARY SVC/PX: CPT

## 2018-05-31 PROCEDURE — 85025 COMPLETE CBC W/AUTO DIFF WBC: CPT

## 2018-05-31 PROCEDURE — 83735 ASSAY OF MAGNESIUM: CPT

## 2018-05-31 PROCEDURE — 63600175 PHARM REV CODE 636 W HCPCS: Performed by: NURSE PRACTITIONER

## 2018-05-31 PROCEDURE — 85730 THROMBOPLASTIN TIME PARTIAL: CPT

## 2018-05-31 PROCEDURE — 84132 ASSAY OF SERUM POTASSIUM: CPT

## 2018-05-31 PROCEDURE — A4216 STERILE WATER/SALINE, 10 ML: HCPCS | Performed by: THORACIC SURGERY (CARDIOTHORACIC VASCULAR SURGERY)

## 2018-05-31 PROCEDURE — 63600175 PHARM REV CODE 636 W HCPCS: Performed by: STUDENT IN AN ORGANIZED HEALTH CARE EDUCATION/TRAINING PROGRAM

## 2018-05-31 PROCEDURE — 97535 SELF CARE MNGMENT TRAINING: CPT

## 2018-05-31 PROCEDURE — 85610 PROTHROMBIN TIME: CPT

## 2018-05-31 PROCEDURE — 99233 SBSQ HOSP IP/OBS HIGH 50: CPT | Mod: GC,,, | Performed by: INTERNAL MEDICINE

## 2018-05-31 PROCEDURE — 99232 SBSQ HOSP IP/OBS MODERATE 35: CPT | Mod: ,,, | Performed by: NURSE PRACTITIONER

## 2018-05-31 PROCEDURE — 97168 OT RE-EVAL EST PLAN CARE: CPT

## 2018-05-31 PROCEDURE — 94761 N-INVAS EAR/PLS OXIMETRY MLT: CPT

## 2018-05-31 PROCEDURE — 83735 ASSAY OF MAGNESIUM: CPT | Mod: 91

## 2018-05-31 RX ORDER — DEXTROSE MONOHYDRATE AND SODIUM CHLORIDE 5; .3 G/100ML; G/100ML
INJECTION, SOLUTION INTRAVENOUS CONTINUOUS
Status: DISCONTINUED | OUTPATIENT
Start: 2018-05-31 | End: 2018-05-31

## 2018-05-31 RX ORDER — INSULIN ASPART 100 [IU]/ML
3 INJECTION, SOLUTION INTRAVENOUS; SUBCUTANEOUS
Status: DISCONTINUED | OUTPATIENT
Start: 2018-05-31 | End: 2018-06-01

## 2018-05-31 RX ORDER — DEXTROSE MONOHYDRATE 50 MG/ML
INJECTION, SOLUTION INTRAVENOUS CONTINUOUS
Status: DISCONTINUED | OUTPATIENT
Start: 2018-05-31 | End: 2018-06-17

## 2018-05-31 RX ORDER — INSULIN ASPART 100 [IU]/ML
3 INJECTION, SOLUTION INTRAVENOUS; SUBCUTANEOUS
Status: DISCONTINUED | OUTPATIENT
Start: 2018-06-01 | End: 2018-06-01

## 2018-05-31 RX ORDER — INSULIN ASPART 100 [IU]/ML
3 INJECTION, SOLUTION INTRAVENOUS; SUBCUTANEOUS
Status: DISCONTINUED | OUTPATIENT
Start: 2018-06-01 | End: 2018-06-02

## 2018-05-31 RX ADMIN — INSULIN ASPART 2 UNITS: 100 INJECTION, SOLUTION INTRAVENOUS; SUBCUTANEOUS at 08:05

## 2018-05-31 RX ADMIN — INSULIN ASPART 3 UNITS: 100 INJECTION, SOLUTION INTRAVENOUS; SUBCUTANEOUS at 08:05

## 2018-05-31 RX ADMIN — ATORVASTATIN CALCIUM 40 MG: 20 TABLET, FILM COATED ORAL at 08:05

## 2018-05-31 RX ADMIN — Medication 3 ML: at 09:05

## 2018-05-31 RX ADMIN — WARFARIN SODIUM 3 MG: 2 TABLET ORAL at 04:05

## 2018-05-31 RX ADMIN — Medication 3 ML: at 01:05

## 2018-05-31 RX ADMIN — INSULIN ASPART 2 UNITS: 100 INJECTION, SOLUTION INTRAVENOUS; SUBCUTANEOUS at 04:05

## 2018-05-31 RX ADMIN — INSULIN ASPART 3 UNITS: 100 INJECTION, SOLUTION INTRAVENOUS; SUBCUTANEOUS at 12:05

## 2018-05-31 RX ADMIN — HYDRALAZINE HYDROCHLORIDE 20 MG: 20 INJECTION INTRAMUSCULAR; INTRAVENOUS at 08:05

## 2018-05-31 RX ADMIN — INSULIN ASPART 3 UNITS: 100 INJECTION, SOLUTION INTRAVENOUS; SUBCUTANEOUS at 04:05

## 2018-05-31 RX ADMIN — INSULIN ASPART 3 UNITS: 100 INJECTION, SOLUTION INTRAVENOUS; SUBCUTANEOUS at 11:05

## 2018-05-31 RX ADMIN — POLYETHYLENE GLYCOL 3350 17 G: 17 POWDER, FOR SOLUTION ORAL at 08:05

## 2018-05-31 RX ADMIN — INSULIN ASPART 2 UNITS: 100 INJECTION, SOLUTION INTRAVENOUS; SUBCUTANEOUS at 12:05

## 2018-05-31 RX ADMIN — HYDRALAZINE HYDROCHLORIDE 10 MG: 20 INJECTION INTRAMUSCULAR; INTRAVENOUS at 02:05

## 2018-05-31 RX ADMIN — FAMOTIDINE 20 MG: 20 TABLET ORAL at 08:05

## 2018-05-31 RX ADMIN — INSULIN ASPART 0 UNITS: 100 INJECTION, SOLUTION INTRAVENOUS; SUBCUTANEOUS at 11:05

## 2018-05-31 RX ADMIN — AMLODIPINE BESYLATE 10 MG: 10 TABLET ORAL at 11:05

## 2018-05-31 RX ADMIN — DOXAZOSIN MESYLATE 4 MG: 2 TABLET ORAL at 08:05

## 2018-05-31 RX ADMIN — DEXTROSE: 5 SOLUTION INTRAVENOUS at 08:05

## 2018-05-31 RX ADMIN — ASPIRIN 81 MG 81 MG: 81 TABLET ORAL at 08:05

## 2018-05-31 NOTE — PLAN OF CARE
Problem: Patient Care Overview  Goal: Individualization & Mutuality  Dx: CHF  Hx: mitral valve endocarditis and resultant severe MR, TR and pulmonary HTN, EF 36%, diverticulosus, BPH    5/23: admit pre-op  5/24: IABP  5/25: OR for MV replacement, CABGx1  5/26: IABP d/c, 500mL albumin, lasix gtt started. Extubated.  5/27: Cardene restarted, central line d/c      Nursing:  MAP 60-80         Outcome: Ongoing (interventions implemented as appropriate)  Pt AAOx4. OOB to chair for most of the day.  Remains on room air. Tolerating tube feeds at goal rate of 50cc/hr, 300cc free water boluses given per order for hypernatremia. MIVF infusing at 50cc/hr. Plan of care reviewed with patient and patients spouse. Will continue to monitor.

## 2018-05-31 NOTE — PLAN OF CARE
Problem: Patient Care Overview  Goal: Plan of Care Review  Outcome: Ongoing (interventions implemented as appropriate)  Pt AAOx4. Did not sleep overnight. Wants NG tube out. Remains on room air. Tolerating tube feeds at goal rate of 50cc/hr, 200cc free water boluses given per order for hypernaremia. MIVF infusing at 40cc/hr. Pt to get oob to chair this am and will go for barium swallow today. Plan of care reviewed with patient and patients spouse. Will continue to monitor.

## 2018-05-31 NOTE — ASSESSMENT & PLAN NOTE
68 y/o M w POD# 5 s/p bioprostethetic mitral valve replacement and CABG x1. Initially with CHB and AF, no longer in heart block but has AF, due to left atrial thrombus will not cardiovert.  Will continue to observe, no plan as of now for pacemaker implantation.    -patient is still rate controlled ~80 bpm  -intermittently v paced  -Hold AV amelia blockers   -Keep epicardial pacer in place   -Agree with coumadin   -Agree with theophyline

## 2018-05-31 NOTE — ASSESSMENT & PLAN NOTE
BG goal 110-140   Increase novolog to 3 units q4h w/ TF  Hold if TF is on hold  Low dose correction scale.    Please page endocrine NP on call with any BG related issues or concerns. Thanks.       Discharge plans-TBD

## 2018-05-31 NOTE — PHYSICIAN QUERY
PT Name: Rodolfo Messina  MR #: [id number]    Physician Query Form - Cause and Effect Relationship Clarification      CDS/: Negra Melara RN, CCDS             Contact information: [email or web address]    This form is a permanent document in the medical record.     Query Date: May 31, 2018    Per notes- 5/29- 6/1 and 6/4, Noted to have post traumatic left sided epistaxis following attempted NGT placement, On the time of evaluation patient was hemostatic.    By submitting this query, we are merely seeking further clarification of documentation. Please utilize your independent clinical judgment when addressing the question(s) below.    The Medical record contains the following:  Supporting Clinical Findings   Location in record                                                                      Patient noted to have epistaxis following NGT placement. Otolaryngology consulted for evaluation. Bleeding lasted roughly 5-10 minutes from left nostril.                                          The patient is on aspirin and coumadin.                                                                             5/29 ent note                                                                                                                                                                                                       Provider, please clarify if there is any correlation between _Epistaxis  and  aspirin/ coumadin.       Are the conditions:     [  ] Due to or associated with each other     [  ] Unrelated to each other     [  ] Other (Please Specify): _________________________     [  ] Clinically Undetermined

## 2018-05-31 NOTE — PT/OT/SLP DISCHARGE
Occupational Therapy Discharge Summary    Rodolfo Messina  MRN: [id number]   Principal Problem: S/P MVR (mitral valve replacement)      Patient Discharged from acute Occupational Therapy on 5/31/18.      Assessment:      Patient transferred to lower level of care     Objective:     GOALS:    Occupational Therapy Goals        Problem: Occupational Therapy Goal    Goal Priority Disciplines Outcome Interventions   Occupational Therapy Goal     OT, PT/OT Ongoing (interventions implemented as appropriate)    Description:  Goals to be met by: 5/8/18     Patient will increase functional independence with ADLs by performing:    Feeding with Modified [place].  UE Dressing with Supervision.  LE Dressing with Supervision.  Grooming while standing at sink with Supervision.  Toileting from toilet with Supervision for hygiene and clothing management.   Toilet transfer to toilet with Supervision.                      Reasons for Discontinuation of Therapy Services  Transfer to alternate level of care.      Plan:     Patient Discharged to: ICU  MILTON Olguin  5/31/2018

## 2018-05-31 NOTE — PHYSICIAN QUERY
PT Name: Rodolfo Messina  MR #: [id number]    Physician Query Form - Atrial Fibrillation Specificity     CDS/: Negra Melara RN, CCDS             Contact information: [email or web address]     This form is a permanent document in the medical record.     Query Date: May 31, 2018    By submitting this query, we are merely seeking further clarification of documentation. Please utilize your independent clinical judgment when addressing the question(s) below.    The medical record contains the following:   Indicators     Supporting Clinical Findings Location in Medical Record   X Atrial Fibrillation still intermittently Vpaced.  Mostly in afib thought    Initially with CHB and AF, no longer in heart block but has AF,  5/31 consult    X EKG results Atrial fibrillation  Junctional rhythm with occasional with premature ventricular or aberrantly  conducted complexes    Atrial fibrillation with premature ventricular or aberrantly conducted  complexes 5/28 ekg          5/30 ekg   X Medication Agree with coumadin   -Agree with theophyline 5/31 consult    X Treatment Keep epicardial pacer in place  5/31 consult    Other         Provider, please further specify the Atrial Fibrillation diagnosis.    [  ] Chronic  [ x ] Paroxysmal  [  ] Permanent  [  ] Persistent  [x  ] Other (please specify): ___post op_________________________  [  ] Clinically Undetermined    Please document in your progress notes daily for the duration of treatment until resolved, and include in your discharge summary.

## 2018-05-31 NOTE — PT/OT/SLP EVAL
Speech Language Pathology Evaluation  Bedside Swallow    Patient Name:  Rodolfo Messina   MRN:  [id number]  Admitting Diagnosis: S/P MVR (mitral valve replacement)  The primary encounter diagnosis was S/P MVR (mitral valve replacement). Diagnoses of CHF (congestive heart failure), Pre-op exam, Mitral and aortic valve regurgitation, Tremor, Arrhythmia, History of open heart surgery, Coronary artery disease of native artery of native heart with stable angina pectoris, Acute hyperglycemia, Chronic systolic congestive heart failure, S/P CABG x 1, Heart abnormality, Abnormal cardiac valve, and Abnormal heart rhythm were also pertinent to this visit.    Recommendations:                 General Recommendations:  Dysphagia therapy  Diet recommendations:  NPO, NPO and ST to continue to assess feasibility of re-initiation of pleasure feeds pending progress with therapy  Aspiration Precautions: Continue alternate means of nutrition and Strict aspiration precautions   General Precautions: Standard, aspiration  Communication strategies:  provide increased time to answer    History:     Past Medical History:   Diagnosis Date    ANNA (acute kidney injury) 2/22/2018    Anemia     Anxiety     Arthritis     BPH (benign prostatic hyperplasia)     CHF (congestive heart failure) 5/23/2018    Coronary artery disease of native artery of native heart with stable angina pectoris 5/21/2018    Diverticulosis     Encounter for blood transfusion     Hypertension     Urinary retention        Past Surgical History:   Procedure Laterality Date    APPENDECTOMY      COLONOSCOPY      COLONOSCOPY N/A 2/1/2018    Procedure: COLONOSCOPY;  Surgeon: Richar Payan MD;  Location: [facility] ([facility]);  Service: Endoscopy;  Laterality: N/A;  PM prep    CREATION OF AORTOCORONARY ARTERY BYPASS N/A 5/25/2018    Procedure: AORTOCORONARY BYPASS-CABG;  Surgeon: Marvin Go MD;  Location: [facility] OR [address] [place] [address];  Service: Cardiovascular;  Laterality:  "N/A;    EYE SURGERY Right     cataract extraction    FINGER SURGERY      FRACTURE SURGERY Right     index finger    MITRAL VALVE REPLACEMENT N/A 5/25/2018    Procedure: Mitral Valve Replacement;  Surgeon: Marvin Go MD;  Location: [facility] OR [address] [place] [address];  Service: Cardiovascular;  Laterality: N/A;    TONSILLECTOMY         Prior Intubation HX:  5/25 -5/26    Modified Barium Swallow: yes, 5/29/18    Chest X-Rays: 5/31/18: Bibasilar opacification slightly increased on the right compared to prior.    Prior diet: reg/thin    Subjective     SLP reviewed Pt with nurse, nurse reports Pt eager to have NG removed and clears PT for PO trials  Pt presents lethargic, cooperative  He explains, "I know I have to tuck my chin"  Pt's spouse explains, "His voice has always been fluctuating like that"  Patient goals: to drink water    Pain/Comfort:  · Pain Rating 1: 0/10  · Pain Rating Post-Intervention 1: 0/10    Objective:   Pt found awake in chair next to bed, family in room with patient.     Oral Musculature Evaluation  · Oral Musculature: WFL  · Dentition: present and adequate  · Secretion Management: adequate  · Mucosal Quality: dry  · Mandibular Strength and Mobility: WFL  · Oral Labial Strength and Mobility: WFL  · Lingual Strength and Mobility: WFL  · Velar Elevation: WFL  · Buccal Strength and Mobility: WFL  · Volitional Cough: elciited, reduced   · Volitional Swallow: elciited   · Voice Prior to PO Intake: dry, breathy, Pt reports variable intensity at baseline 2/2 vocal tremors    Bedside Swallow Eval:   Consistencies Assessed:  · Nectar thick liquids tsp sips fed by clinician x6  · Puree 1/2 tsp bitesx3, full tsp bites x2 fed by clinician      Oral Phase:   · WFL    Pharyngeal Phase:   · throat clearing    Compensatory Strategies  · Chin tuck  · Multiple swallows (x3+) between bites/sips    Treatment: Pt with inconsistent throat clearing with presentations of puree and nectar-thickened liquids this service day. He " requires Supervision to demonstrate safe swallow strategies.  SLP educates Patient and family on findings, results from recent MBSS, ongoing Dysphagia exercises, and need for ongoing monitoring of tolerance of trials and use of compensatory strategies prior to initiation of PO diet. Patient asking if he can have water. SLP answers patient's question and provides encouragement. Patient's nurse notified of findings.     Assessment:     Rodolfo Messina is a 67 y.o. male with an SLP diagnosis of Moderate Oropharyngeal Dyspahgia.  He presents with inconsistent throat clearing on trials presented at the bedside. ST to continue to follow.      Goals:    SLP Goals        Problem: SLP Goal    Goal Priority Disciplines Outcome   SLP Goal     SLP Ongoing (interventions implemented as appropriate)   Description:  Speech Language Pathology Goals  Goals expected to be met by 6/7:  1. Pt will tolerate trials of puree w/o overt S/S aspiration, MIN A  2. Pt will tolerate trials of nectar-thickened liquids w/o overt S/S aspiration, MIN A  3. Educate Pt and family on safe swallow strategies and S/S aspiration  4. Continue to assess swallow to determine feasibility of PO upgrade       Goals expected to be met by 6/4: goals not met 2/2 transfer to ICU 5/30  1. Pt will participate in ongoing swallowing assessment to determine safest and least restrictive diet.                          Plan:     · Patient to be seen:  5 x/week   · Plan of Care expires:  06/30/18  · Plan of Care reviewed with:  patient   · SLP Follow-Up:  Yes       Discharge recommendations:  home health speech therapy   Barriers to Discharge:  Level of Skilled Assistance Needed     Time Tracking:     SLP Treatment Date:   05/31/18  Speech Start Time:  1433  Speech Stop Time:  1515     Speech Total Time (min):  42 min    Billable Minutes: Eval Swallow and Oral Function 42    CAREY Salgado., [facility]-SLP  Speech-Language Pathology  Pager: [phone]    05/31/2018

## 2018-05-31 NOTE — PROGRESS NOTES
[facility]  Critical Care - Surgery  Progress Note    Patient Name: Rodolfo Messina  MRN: [id number]  Admission Date: 5/23/2018  Hospital Length of Stay: 8 days  Code Status: Full Code  Attending Provider: Marvin Go MD  Primary Care Provider: Deric Claudio MD   Principal Problem: S/P MVR (mitral valve replacement)    Subjective:     Hospital/ICU Course:  No notes on file    Interval History/Significant Events: NAEON. BUn/Cr improving, mental status improving. Still with days and nights mixed up.    Follow-up For: Procedure(s) (LRB):  Mitral Valve Replacement (N/A)  AORTOCORONARY BYPASS-CABG (N/A)    Post-Operative Day: 6 Days Post-Op    Objective:     Vital Signs (Most Recent):  Temp: 98.7 °F (37.1 °C) (05/30/18 1500)  Pulse: 81 (05/31/18 0615)  Resp: 20 (05/31/18 0615)  BP: (!) 150/103 (05/31/18 0600)  SpO2: 100 % (05/31/18 0615) Vital Signs (24h Range):  Temp:  [96.5 °F (35.8 °C)-98.7 °F (37.1 °C)] 98.7 °F (37.1 °C)  Pulse:  [75-85] 81  Resp:  [16-33] 20  SpO2:  [92 %-100 %] 100 %  BP: (123-156)/() 150/103     Weight: 68.5 kg (151 lb 0.2 oz)  Body mass index is 21.67 kg/m².      Intake/Output Summary (Last 24 hours) at 05/31/18 0643  Last data filed at 05/31/18 0518   Gross per 24 hour   Intake             2922 ml   Output             1760 ml   Net             1162 ml       Physical Exam   Constitutional: He is oriented to person, place, and time. He appears well-developed.   HENT:   Head: Normocephalic and atraumatic.   Eyes: EOM are normal. Pupils are equal, round, and reactive to light.   Neck: Normal range of motion. Neck supple.   Cardiovascular: Regular rhythm.    Paced at 80   Pulmonary/Chest:   Extubated, on nasal canula    Abdominal: Soft. Bowel sounds are normal. He exhibits no distension. There is no tenderness.   Neurological: He is alert and oriented to person, place, and time.   Mental status improving   Skin: Skin is warm and dry.       Vents:  Vent Mode: Spont  (05/26/18 2025)  Ventilator Initiated: Yes (05/25/18 1509)  Set Rate: 0 bmp (05/26/18 2025)  Vt Set: 500 mL (05/26/18 2025)  Pressure Support: 10 cmH20 (05/26/18 2025)  PEEP/CPAP: 5 cmH20 (05/26/18 2025)  Oxygen Concentration (%): 24 (05/29/18 0402)  Peak Airway Pressure: 16 cmH2O (05/26/18 2025)  Plateau Pressure: 0 cmH20 (05/26/18 2025)  Total Ve: 11.8 mL (05/26/18 2025)  F/VT Ratio<105 (RSBI): (!) 32.76 (05/26/18 2025)    Lines/Drains/Airways     Peripherally Inserted Central Catheter Line                 PICC Double Lumen 03/05/18 1539 right basilic 86 days          Central Venous Catheter Line                 Percutaneous Central Line Insertion/Assessment - Quad lumen  05/25/18 0800 left subclavian 5 days          Drain                 NG/OG Tube 05/29/18 1609 nasogastric 16 Fr. Right nostril 1 day         Urethral Catheter 05/30/18 0445 Straight-tip 16 Fr. 1 day          Line                 Pacer Wires 05/25/18 1355 5 days          Peripheral Intravenous Line                 Peripheral IV - Single Lumen 05/29/18 1734 Right Forearm 1 day         Midline Catheter Insertion/Assessment  - Single Lumen 05/30/18 1623 Right basilic vein (medial side of arm) 18g x 8cm less than 1 day                Significant Labs:    CBC/Anemia Profile:    Recent Labs  Lab 05/30/18  1659 05/31/18  0400   WBC 5.62 6.86   HGB 7.4* 7.5*   HCT 22.2* 23.6*   PLT 78* 82*   MCV 94 96   RDW 16.2* 16.4*        Chemistries:    Recent Labs  Lab 05/30/18  1015 05/30/18  1659 05/30/18  2055 05/31/18  0400   NA  --  155*  155* 155* 157*   K 4.0 3.8  3.8  3.8 3.8 4.2   CL  --  115*  115* 115* 117*   CO2  --  32*  32* 31* 32*   BUN  --  119*  119* 116* 112*   CREATININE  --  2.7*  2.7* 2.5* 2.2*   CALCIUM  --  9.7  9.7 9.7 9.9   ALBUMIN  --  3.3*  3.3* 3.0* 3.0*   PROT  --  6.6  6.6 6.3 6.6   BILITOT  --  1.9*  1.9* 1.7* 1.7*   ALKPHOS  --  92  92 88 100   ALT  --  15  15 14 15   AST  --  62*  62* 56* 54*   MG 3.4* 3.3*  --  3.1*    PHOS  --   --   --  2.4*       All pertinent labs within the past 24 hours have been reviewed.    Significant Imaging:  I have reviewed all pertinent imaging results/findings within the past 24 hours.    Assessment/Plan:     Chronic systolic congestive heart failure     66yo M with PMH of mitral valve endocarditis and resultant severe MR, TR and pulmonary HTN who is now s/p IABP placement (5/24) and s/pMVR and CABG 5/25.     Plan:     Neuro:   -No issues  -Pain control per CTS     Pulmonary:   -Saturating well on nasal canula, wean as tolerated  -ABGs prn  ABG  -Continuous pulse ox     Cardiac:  -MAP goal >65  -Paced at 80  -Amlodipine daily  -Statin daily  -TTE revealing LA thrombus yesterday so cannot perform DCCV at this time  - EPS thinks amelia function may return in time, but recommended starting theophyline in the meantime (ordered)  -Coumadin therapy started given clot  -ASA  -Continuous cardiac monitoring     Renal:   -UOP adequate 1.7L yesterday, Net +1.1L  -Bun/Cr 112/2.2, down from 111/3.0 yesterday (baseline Cr 1.4-1.6)  -Lasix gtt stopped, MIVF with D51/2NS @ 40cc/hr  -s/p Albumin 500cc given yesterday  -Flomax daily given home use     Fluids/Electrolytes/Nutrition/GI:   -Lasix gtt stopped, MIVF started  -Na up to 157 this am. Free water boluses started @ 200cc QID  -Nutritional status: NPO except meds  -Barium swallow ordered  -TFs started  -replace lytes PRN  -Bowel regimen     Hematology/Oncology:  -H/H 7.5/23.6, continue to monitor  -INR2.1  -CBC daily     Infectious Disease:   -Afebrile  -WBC 6  -CBC daily   -Abx: ancef x 5 doses, completed     Endocrine:  - No h/o DM or thyroid dysfunction  - Endo following for BG, Added novolog 3 units q4h w/ TF and LDSSI  - Glucose goal of 120-180     Dispo:  -CTS primary                       Critical care was time spent personally by me on the following activities: development of treatment plan with patient or surrogate and bedside caregivers, discussions  with consultants, evaluation of patient's response to treatment, examination of patient, ordering and performing treatments and interventions, ordering and review of laboratory studies, ordering and review of radiographic studies, pulse oximetry, re-evaluation of patient's condition.  This critical care time did not overlap with that of any other provider or involve time for any procedures.     Ludmila Santana MD  Critical Care - Surgery  [facility]

## 2018-05-31 NOTE — PLAN OF CARE
Problem: Physical Therapy Goal  Goal: Physical Therapy Goal  Goals to be met by: 2018    Patient will increase functional independence with mobility by performin. Supine to sit with Supervision - not met  2. Sit to stand transfer with Supervision - not met  3. Bed to chair transfer with Supervision  - not met  4. Gait  x 250 feet with Supervision - not met  5. Lower extremity exercise program x20 reps per handout, with independence - not met     Outcome: Ongoing (interventions implemented as appropriate)  Re-assessment completed.  Goals remain appropriate.  Noreen Raymundo, PT  2018

## 2018-05-31 NOTE — PHYSICIAN QUERY
PT Name: Rodolfo Messina  MR #: [id number]    Physician Query Form - Atrial Flutter Specificity Clarification     CDS/: Negra Melara RN, CCDS             Contact information: [email or web address]  This form is a permanent document in the medical record.     Query Date: May 31, 2018    By submitting this query, we are merely seeking further clarification of documentation. Please utilize your independent clinical judgment when addressing the question(s) below.    The medical record contains the following:   Indicators     Supporting Clinical Findings Location in Medical Record   X Atrial Flutter Atrial Flutter    Underlying rhythm AFL with variable AV block, intermittent junctional bradycardia  5/29- 5/31 prog notes    5/29 consult     EKG results      Medication     X Treatment external pacer set at 80 5/30 prog note    Other       Provider, please further specify the Atrial Flutter diagnosis.    [ x ] Atypical  [x  ] Type I  [  ] Type II  [  ] Typical  [  ] Other (please specify): _________post op__________________________  [  ] Clinically Undetermined    Please document in your progress notes daily for the duration of treatment until resolved, and include in your discharge summary.

## 2018-05-31 NOTE — SUBJECTIVE & OBJECTIVE
Interval History/Significant Events: NAEON. BUn/Cr improving, mental status improving. Still with days and nights mixed up.    Follow-up For: Procedure(s) (LRB):  Mitral Valve Replacement (N/A)  AORTOCORONARY BYPASS-CABG (N/A)    Post-Operative Day: 6 Days Post-Op    Objective:     Vital Signs (Most Recent):  Temp: 98.7 °F (37.1 °C) (05/30/18 1500)  Pulse: 81 (05/31/18 0615)  Resp: 20 (05/31/18 0615)  BP: (!) 150/103 (05/31/18 0600)  SpO2: 100 % (05/31/18 0615) Vital Signs (24h Range):  Temp:  [96.5 °F (35.8 °C)-98.7 °F (37.1 °C)] 98.7 °F (37.1 °C)  Pulse:  [75-85] 81  Resp:  [16-33] 20  SpO2:  [92 %-100 %] 100 %  BP: (123-156)/() 150/103     Weight: 68.5 kg (151 lb 0.2 oz)  Body mass index is 21.67 kg/m².      Intake/Output Summary (Last 24 hours) at 05/31/18 0643  Last data filed at 05/31/18 0518   Gross per 24 hour   Intake             2922 ml   Output             1760 ml   Net             1162 ml       Physical Exam   Constitutional: He is oriented to person, place, and time. He appears well-developed.   HENT:   Head: Normocephalic and atraumatic.   Eyes: EOM are normal. Pupils are equal, round, and reactive to light.   Neck: Normal range of motion. Neck supple.   Cardiovascular: Regular rhythm.    Paced at 80   Pulmonary/Chest:   Extubated, on nasal canula    Abdominal: Soft. Bowel sounds are normal. He exhibits no distension. There is no tenderness.   Neurological: He is alert and oriented to person, place, and time.   Mental status improving   Skin: Skin is warm and dry.       Vents:  Vent Mode: Spont (05/26/18 2025)  Ventilator Initiated: Yes (05/25/18 1509)  Set Rate: 0 bmp (05/26/18 2025)  Vt Set: 500 mL (05/26/18 2025)  Pressure Support: 10 cmH20 (05/26/18 2025)  PEEP/CPAP: 5 cmH20 (05/26/18 2025)  Oxygen Concentration (%): 24 (05/29/18 0402)  Peak Airway Pressure: 16 cmH2O (05/26/18 2025)  Plateau Pressure: 0 cmH20 (05/26/18 2025)  Total Ve: 11.8 mL (05/26/18 2025)  F/VT Ratio<105 (RSBI): (!) 32.76  (05/26/18 2025)    Lines/Drains/Airways     Peripherally Inserted Central Catheter Line                 PICC Double Lumen 03/05/18 1539 right basilic 86 days          Central Venous Catheter Line                 Percutaneous Central Line Insertion/Assessment - Quad lumen  05/25/18 0800 left subclavian 5 days          Drain                 NG/OG Tube 05/29/18 1609 nasogastric 16 Fr. Right nostril 1 day         Urethral Catheter 05/30/18 0445 Straight-tip 16 Fr. 1 day          Line                 Pacer Wires 05/25/18 1355 5 days          Peripheral Intravenous Line                 Peripheral IV - Single Lumen 05/29/18 1734 Right Forearm 1 day         Midline Catheter Insertion/Assessment  - Single Lumen 05/30/18 1623 Right basilic vein (medial side of arm) 18g x 8cm less than 1 day                Significant Labs:    CBC/Anemia Profile:    Recent Labs  Lab 05/30/18  1659 05/31/18  0400   WBC 5.62 6.86   HGB 7.4* 7.5*   HCT 22.2* 23.6*   PLT 78* 82*   MCV 94 96   RDW 16.2* 16.4*        Chemistries:    Recent Labs  Lab 05/30/18  1015 05/30/18  1659 05/30/18  2055 05/31/18  0400   NA  --  155*  155* 155* 157*   K 4.0 3.8  3.8  3.8 3.8 4.2   CL  --  115*  115* 115* 117*   CO2  --  32*  32* 31* 32*   BUN  --  119*  119* 116* 112*   CREATININE  --  2.7*  2.7* 2.5* 2.2*   CALCIUM  --  9.7  9.7 9.7 9.9   ALBUMIN  --  3.3*  3.3* 3.0* 3.0*   PROT  --  6.6  6.6 6.3 6.6   BILITOT  --  1.9*  1.9* 1.7* 1.7*   ALKPHOS  --  92  92 88 100   ALT  --  15  15 14 15   AST  --  62*  62* 56* 54*   MG 3.4* 3.3*  --  3.1*   PHOS  --   --   --  2.4*       All pertinent labs within the past 24 hours have been reviewed.    Significant Imaging:  I have reviewed all pertinent imaging results/findings within the past 24 hours.

## 2018-05-31 NOTE — H&P
[facility]  Critical Care - Surgery  Progress Note     Patient Name: Rodolfo Messina  MRN: [id number]  Admission Date: 5/23/2018  Hospital Length of Stay: 7 days  Code Status: Full Code  Attending Provider: Marvin Go MD  Primary Care Provider: Deric Claudio MD   Principal Problem: S/P MVR (mitral valve replacement)     Subjective:      Hospital/ICU Course:  No notes on file     Follow-up For: Procedure(s) (LRB):  Mitral Valve Replacement (N/A)  AORTOCORONARY BYPASS-CABG (N/A)     Post-Operative Day: 5 Days Post-Op          Past Medical History:   Diagnosis Date    ANNA (acute kidney injury) 2/22/2018    Anemia      Anxiety      Arthritis      BPH (benign prostatic hyperplasia)      CHF (congestive heart failure) 5/23/2018    Coronary artery disease of native artery of native heart with stable angina pectoris 5/21/2018    Diverticulosis      Encounter for blood transfusion      Hypertension      Urinary retention                 Past Surgical History:   Procedure Laterality Date    APPENDECTOMY        COLONOSCOPY        COLONOSCOPY N/A 2/1/2018     Procedure: COLONOSCOPY;  Surgeon: Richar Payan MD;  [facility]  Service: Endoscopy;  Laterality: N/A;  PM prep    CREATION OF AORTOCORONARY ARTERY BYPASS N/A 5/25/2018     Procedure: AORTOCORONARY BYPASS-CABG;  Surgeon: Marvin Go MD;  Location: [address];  Service: Cardiovascular;  Laterality: N/A;    EYE SURGERY Right       cataract extraction    FINGER SURGERY        FRACTURE SURGERY Right       index finger    MITRAL VALVE REPLACEMENT N/A 5/25/2018     Procedure: Mitral Valve Replacement;  Surgeon: Marvin Go MD;  Location: [facility] OR [address] [place] [address];  Service: Cardiovascular;  Laterality: N/A;    TONSILLECTOMY                   Review of patient's allergies indicates:   Allergen Reactions    Shellfish containing products      Augmentin [amoxicillin-pot clavulanate]         Patient felt that  it raised BP and requested to have it added as intolerance. Tolerated unasyn    Ciprofloxacin      Flagyl [metronidazole]      Lisinopril Other (See Comments)       cough    Mysoline [primidone]      Omnicef [cefdinir]                Family History      Problem Relation (Age of Onset)     Heart disease Mother, Father     No Known Problems Sister, Brother     Stroke Mother                 Social History Main Topics    Smoking status: Never Smoker    Smokeless tobacco: Never Used    Alcohol use No         Comment: none recently    Drug use: No    Sexual activity: Not Currently       Partners: Female      Review of Systems   Constitutional: Negative.    HENT: Negative.    Eyes: Negative.    Respiratory: Negative.    Cardiovascular: Negative.    Gastrointestinal: Negative.    Endocrine: Negative.    Genitourinary: Negative for difficulty urinating.   Musculoskeletal: Negative.    Allergic/Immunologic: Negative.    Neurological: Negative.    Psychiatric/Behavioral: Negative.       Objective:      Vital Signs (Most Recent):  Temp: 98.1 °F (36.7 °C) (05/30/18 1218)  Pulse: 75 (05/30/18 1218)  Resp: 18 (05/30/18 1218)  BP: (!) 152/70 (05/30/18 1218)  SpO2: (!) 92 % (05/30/18 1218) Vital Signs (24h Range):  Temp:  [96.5 °F (35.8 °C)-98.5 °F (36.9 °C)] 98.1 °F (36.7 °C)  Pulse:  [54-84] 75  Resp:  [16-31] 18  SpO2:  [92 %-97 %] 92 %  BP: (131-156)/(61-78) 152/70  Arterial Line BP: (141-159)/(49-57) 159/57      Weight: 68.5 kg (151 lb 0.2 oz)  Body mass index is 21.67 kg/m².        Intake/Output Summary (Last 24 hours) at 05/30/18 1302  Last data filed at 05/30/18 1000    Gross per 24 hour   Intake              488 ml   Output              730 ml   Net             -242 ml         Physical Exam   Constitutional: He is oriented to person, place, and time. He appears well-developed and well-nourished.   HENT:   Head: Normocephalic and atraumatic.   Eyes: EOM are normal. Pupils are equal, round, and reactive to light.    Neck: Normal range of motion.   Cardiovascular:   Paced at 60   Pulmonary/Chest: Effort normal and breath sounds normal.   Abdominal: Soft. Bowel sounds are normal. He exhibits no distension. There is no tenderness.   Neurological: He is alert and oriented to person, place, and time.         Vents:  Vent Mode: Spont (05/26/18 2025)  Ventilator Initiated: Yes (05/25/18 1509)  Set Rate: 0 bmp (05/26/18 2025)  Vt Set: 500 mL (05/26/18 2025)  Pressure Support: 10 cmH20 (05/26/18 2025)  PEEP/CPAP: 5 cmH20 (05/26/18 2025)  Oxygen Concentration (%): 24 (05/29/18 0402)  Peak Airway Pressure: 16 cmH2O (05/26/18 2025)  Plateau Pressure: 0 cmH20 (05/26/18 2025)  Total Ve: 11.8 mL (05/26/18 2025)  F/VT Ratio<105 (RSBI): (!) 32.76 (05/26/18 2025)         Lines/Drains/Airways            Peripherally Inserted Central Catheter Line                          PICC Double Lumen 03/05/18 1539 right basilic 85 days                   Central Venous Catheter Line                          Percutaneous Central Line Insertion/Assessment - Quad lumen  05/25/18 0800 left subclavian 5 days                   Drain                          NG/OG Tube 05/29/18 1609 nasogastric 16 Fr. Right nostril less than 1 day           Urethral Catheter 05/30/18 0445 Straight-tip 16 Fr. less than 1 day                   Line                          Pacer Wires 05/25/18 1355 4 days                   Peripheral Intravenous Line                          Peripheral IV - Single Lumen 05/27/18 1135 Right Forearm 3 days           Peripheral IV - Single Lumen 05/29/18 1734 Right Forearm less than 1 day                     Significant Labs:     CBC/Anemia Profile:     Recent Labs  Lab 05/29/18  0350   WBC 6.21   HGB 7.8*   HCT 23.8*   PLT 70*   MCV 94   RDW 15.9*         Chemistries:     Recent Labs  Lab 05/29/18  0350   05/30/18  0504 05/30/18  0728 05/30/18  1013 05/30/18  1015   *  --   --  154* 153*  --    K 3.8  3.8  < > 4.3 4.5  4.5 4.0 4.0   *   --   --  113* 113*  --    CO2 25  --   --  27 29  --    BUN 89*  --   --  120* 111*  --    CREATININE 2.8*  --   --  3.1* 3.0*  --    CALCIUM 9.2  --   --  10.1 10.2  --    MG 2.9*  < > 3.4* 3.6*  --  3.4*   < > = values in this interval not displayed.     All pertinent labs within the past 24 hours have been reviewed.     Significant Imaging: I have reviewed all pertinent imaging results/findings within the past 24 hours.     Assessment/Plan:          Chronic systolic congestive heart failure       68yo M with PMH of mitral valve endocarditis and resultant severe MR, TR and pulmonary HTN who is now s/p IABP placement (5/24) and s/pMVR and CABG 5/25.     Plan:     Neuro:   -No issues  -Pain control per CTS     Pulmonary:   -Saturating well on nasal canula, wean as tolerated  -ABGs prn  ABG  -Continuous pulse ox     Cardiac:  -MAP goal >65  -Paced at 80  -Amlodipine daily  -Statin daily  -TTE revealing LA thrombus yesterday so cannot perform DCCV at this time  - EPS thinks amelia function may return in time, but recommended starting theophyline in the meantime (ordered)  -Coumadin therapy started given clot  -ASA  -Continuous cardiac monitoring     Renal:   -UOP adequate 1.2L yesterday  -Bun/Cr 111/3.0 (baseline Cr 1.4-1.6), continues to increase  -Lasix gtt stopped, MIVF with D51/2NS @ 40cc/hr  -Albumin 500cc given today  -Flomax daily given home use     Fluids/Electrolytes/Nutrition/GI:   -Lasix gtt stopped, MIVF started  -Nutritional status: NPO except meds  -Barium swallow ordered  -replace lytes PRN  -Bowel regimen     Hematology/Oncology:  -H/H 7.8/23.8, continue to monitor  -INR2.3  -CBC daily     Infectious Disease:   -Afebrile  -WBC 6  -CBC daily   -Abx: ancef x 5 doses, completed     Endocrine:  - No h/o DM or thyroid dysfunction  - Not currently requiring insulin gtt for glucose control  - Glucose goal of 120-180     Dispo:  -CTS primary                             Critical care was time spent personally by  me on the following activities: development of treatment plan with patient or surrogate and bedside caregivers, discussions with consultants, evaluation of patient's response to treatment, examination of patient, ordering and performing treatments and interventions, ordering and review of laboratory studies, ordering and review of radiographic studies, pulse oximetry, re-evaluation of patient's condition.  This critical care time did not overlap with that of any other provider or involve time for any procedures.     Ludmila Santana MD  Critical Care - Surgery  [facility]

## 2018-05-31 NOTE — PT/OT/SLP PROGRESS
"Physical Therapy Reassessment    New orders received to resume PT after transfer to ICU.    Patient Name:  Rodolfo Messina   MRN:  [id number]    Recommendations:     Discharge Recommendations:  home health PT   Discharge Equipment Recommendations: none   Barriers to discharge: None    Assessment:     Rodolfo Messina is a 67 y.o. male admitted with a medical diagnosis of S/P MVR (mitral valve replacement).  He presents with the following impairments/functional limitations:  impaired endurance, gait instability, impaired functional mobilty.    Rehab Prognosis:  fair; patient would benefit from acute skilled PT services to address these deficits and reach maximum level of function.      Recent Surgery: Procedure(s) (LRB):  Mitral Valve Replacement (N/A)  AORTOCORONARY BYPASS-CABG (N/A) 6 Days Post-Op    Plan:     During this hospitalization, patient to be seen 5 x/week to address the above listed problems via gait training, therapeutic activities, therapeutic exercises  · Plan of Care Expires:  06/25/18   Plan of Care Reviewed with: patient    Subjective     Communicated with RN prior to session.  Patient found seated upon PT entry to room, agreeable to treatment.    Bed alarm armed after pt returned to supine per RN request 2* pt having transferred from chair>bed without assistance.     Chief Complaint: "I'm just so weak."  Pain/Comfort:  · Pain Rating 1: 4/10  · Location 1: chest  · Pain Addressed 1: Distraction    Patients cultural, spiritual, Taoism conflicts given the current situation: none reported    Objective:     Patient found with:  (tele, pulse ox, Bp cuff, MARIANO tube, IV)   Bed alarm  General Precautions: Standard,  (fall, sternal, NPO, aspiration)   Orthopedic Precautions:N/A   Braces: N/A     Functional Mobility:  · Bed Mobility:     · Sit to Supine: moderate assistance  Sit>stand with CGA and HHA with cues for sternal precautions  Ambulated x 10 feet and an additional 20 feet with HHA and min assist. "  Limited by fatigue.    AM-PAC 6 CLICK MOBILITY  Turning over in bed (including adjusting bedclothes, sheets and blankets)?: 3  Sitting down on and standing up from a chair with arms (e.g., wheelchair, bedside commode, etc.): 3  Moving from lying on back to sitting on the side of the bed?: 3  Moving to and from a bed to a chair (including a wheelchair)?: 3  Need to walk in hospital room?: 2  Climbing 3-5 steps with a railing?: 1  Total Score: 15       Therapeutic Activities and Exercises:   Educated on sternal precautions.    Patient left supine with all lines intact, call button in reach and bed alarm on..    GOALS:    Physical Therapy Goals        Problem: Physical Therapy Goal    Goal Priority Disciplines Outcome Goal Variances Interventions   Physical Therapy Goal     PT/OT, PT Ongoing (interventions implemented as appropriate)     Description:  Goals to be met by: 2018    Patient will increase functional independence with mobility by performin. Supine to sit with Supervision - not met  2. Sit to stand transfer with Supervision - not met  3. Bed to chair transfer with Supervision  - not met  4. Gait  x 250 feet with Supervision - not met  5. Lower extremity exercise program x20 reps per handout, with independence - not met                      Time Tracking:     PT Received On: 18  PT Start Time: 1100     PT Stop Time: 1125  PT Total Time (min): 25 min     Billable Minutes: Re-eval 15    Treatment Type: Treatment  PT/PTA: PT           Noreen Raymundo, PT  2018

## 2018-05-31 NOTE — PROGRESS NOTES
[facility]  Cardiac Electrophysiology  Progress Note    Admission Date: 5/23/2018  Code Status: Full Code   Attending Physician: Marvin Go MD   Expected Discharge Date: 5/31/2018  Principal Problem:S/P MVR (mitral valve replacement)    Subjective:     Interval History: NAEON, still intermittently Vpaced.  Mostly in afib thought    ROS  Objective:     Vital Signs (Most Recent):  Temp: 98.7 °F (37.1 °C) (05/31/18 0700)  Pulse: 79 (05/31/18 0906)  Resp: (!) 28 (05/31/18 0906)  BP: (!) 98/55 (05/31/18 0900)  SpO2: 95 % (05/31/18 0906) Vital Signs (24h Range):  Temp:  [98.1 °F (36.7 °C)-98.7 °F (37.1 °C)] 98.7 °F (37.1 °C)  Pulse:  [75-85] 79  Resp:  [16-40] 28  SpO2:  [92 %-100 %] 95 %  BP: ()/() 98/55     Weight: 68.5 kg (151 lb 0.2 oz)  Body mass index is 21.67 kg/m².     SpO2: 95 %  O2 Device (Oxygen Therapy): room air    Physical Exam    Significant Labs: EP:   Recent Labs  Lab 05/30/18  0728  05/30/18  1659 05/30/18  2055 05/31/18  0400 05/31/18  0835   *  < > 155*  155* 155* 157* 156*   K 4.5  4.5  < > 3.8  3.8  3.8 3.8 4.2 4.2  4.2   *  < > 115*  115* 115* 117* 117*   CO2 27  < > 32*  32* 31* 32* 30*   *  < > 190*  190* 197* 198* 197*   *  < > 119*  119* 116* 112* 110*   CREATININE 3.1*  < > 2.7*  2.7* 2.5* 2.2* 2.1*   CALCIUM 10.1  < > 9.7  9.7 9.7 9.9 10.2   PROT  --   < > 6.6  6.6 6.3 6.6 7.0   ALBUMIN  --   < > 3.3*  3.3* 3.0* 3.0* 3.3*   BILITOT  --   < > 1.9*  1.9* 1.7* 1.7* 1.7*   ALKPHOS  --   < > 92  92 88 100 111   AST  --   < > 62*  62* 56* 54* 52*   ALT  --   < > 15  15 14 15 17   ANIONGAP 14  < > 8  8 9 8 9   ESTGFRAFRICA 22.8*  < > 27.0*  27.0* 29.6* 34.6* 36.6*   EGFRNONAA 19.7*  < > 23.3*  23.3* 25.6* 29.9* 31.6*   WBC  --   --  5.62  --  6.86  --    HGB  --   --  7.4*  --  7.5*  --    HCT  --   < > 22.2*  --  23.6*  --    PLT  --   --  78*  --  82*  --    INR 2.3*  --   --   --  2.1*  --    < > = values in this  interval not displayed.    Significant Imaging: Echocardiogram:   2D echo with color flow doppler:   Results for orders placed or performed during the hospital encounter of 05/23/18   2D echo with color flow doppler   Result Value Ref Range    EF 50 55 - 65    Aortic Valve Regurgitation TRIVIAL     Est. PA Systolic Pressure 30.46     Tricuspid Valve Regurgitation MILD     and EKG: not done today    Assessment and Plan:     Atrial flutter    68 y/o M w POD# 5 s/p bioprostethetic mitral valve replacement and CABG x1. Initially with CHB and AF, no longer in heart block but has AF, due to left atrial thrombus will not cardiovert.  Will continue to observe, no plan as of now for pacemaker implantation.    -patient is still rate controlled ~80 bpm  -intermittently v paced  -Hold AV amelia blockers   -Keep epicardial pacer in place   -Agree with coumadin   -Agree with theophyline  -Repeat TTE to evaluate for mitral valve abscess                       Jerald Cadena MD  Cardiac Electrophysiology  [facility]

## 2018-05-31 NOTE — SUBJECTIVE & OBJECTIVE
Interval History: NAEON, still intermittently Vpaced.  Mostly in afib thought    ROS  Objective:     Vital Signs (Most Recent):  Temp: 98.7 °F (37.1 °C) (05/31/18 0700)  Pulse: 79 (05/31/18 0906)  Resp: (!) 28 (05/31/18 0906)  BP: (!) 98/55 (05/31/18 0900)  SpO2: 95 % (05/31/18 0906) Vital Signs (24h Range):  Temp:  [98.1 °F (36.7 °C)-98.7 °F (37.1 °C)] 98.7 °F (37.1 °C)  Pulse:  [75-85] 79  Resp:  [16-40] 28  SpO2:  [92 %-100 %] 95 %  BP: ()/() 98/55     Weight: 68.5 kg (151 lb 0.2 oz)  Body mass index is 21.67 kg/m².     SpO2: 95 %  O2 Device (Oxygen Therapy): room air    Physical Exam    Significant Labs: EP:   Recent Labs  Lab 05/30/18  0728  05/30/18  1659 05/30/18  2055 05/31/18  0400 05/31/18  0835   *  < > 155*  155* 155* 157* 156*   K 4.5  4.5  < > 3.8  3.8  3.8 3.8 4.2 4.2  4.2   *  < > 115*  115* 115* 117* 117*   CO2 27  < > 32*  32* 31* 32* 30*   *  < > 190*  190* 197* 198* 197*   *  < > 119*  119* 116* 112* 110*   CREATININE 3.1*  < > 2.7*  2.7* 2.5* 2.2* 2.1*   CALCIUM 10.1  < > 9.7  9.7 9.7 9.9 10.2   PROT  --   < > 6.6  6.6 6.3 6.6 7.0   ALBUMIN  --   < > 3.3*  3.3* 3.0* 3.0* 3.3*   BILITOT  --   < > 1.9*  1.9* 1.7* 1.7* 1.7*   ALKPHOS  --   < > 92  92 88 100 111   AST  --   < > 62*  62* 56* 54* 52*   ALT  --   < > 15  15 14 15 17   ANIONGAP 14  < > 8  8 9 8 9   ESTGFRAFRICA 22.8*  < > 27.0*  27.0* 29.6* 34.6* 36.6*   EGFRNONAA 19.7*  < > 23.3*  23.3* 25.6* 29.9* 31.6*   WBC  --   --  5.62  --  6.86  --    HGB  --   --  7.4*  --  7.5*  --    HCT  --   < > 22.2*  --  23.6*  --    PLT  --   --  78*  --  82*  --    INR 2.3*  --   --   --  2.1*  --    < > = values in this interval not displayed.    Significant Imaging: Echocardiogram:   2D echo with color flow doppler:   Results for orders placed or performed during the hospital encounter of 05/23/18   2D echo with color flow doppler   Result Value Ref Range    EF 50 55 - 65    Aortic Valve  Regurgitation TRIVIAL     Est. PA Systolic Pressure 30.46     Tricuspid Valve Regurgitation MILD     and EKG: not done today

## 2018-05-31 NOTE — PLAN OF CARE
Problem: SLP Goal  Goal: SLP Goal  Speech Language Pathology Goals  Goals expected to be met by 6/7:  1. Pt will tolerate trials of puree w/o overt S/S aspiration, MIN A  2. Pt will tolerate trials of nectar-thickened liquids w/o overt S/S aspiration, MIN A  3. Educate Pt and family on safe swallow strategies and S/S aspiration  4. Continue to assess swallow to determine feasibility of PO upgrade       Goals expected to be met by 6/4: goals not met 2/2 transfer to ICU 5/30  1. Pt will participate in ongoing swallowing assessment to determine safest and least restrictive diet.         Outcome: Ongoing (interventions implemented as appropriate)  Bedside Swallow Study completed s/p transfer to ICU. Patient with recent MBSS completed 5/29 revealing moderate oropharyngeal dysphagia. REC; Continue NPO with alternative means nutrition/hydration/medication. ST to continue to follow up at the bedside to determine safety/feasibility or re-initiation of PO diet. Please see report for full details. Thank you.    CRAEY Salgado., [facility]-SLP  Speech-Language Pathology  Pager: [phone]  5/31/2018

## 2018-05-31 NOTE — PT/OT/SLP PROGRESS
Speech Language Pathology  Discharge    Rodolfo Messina  MRN: [id number]    Patient not seen today secondary to pt transfer to ICU. New orders needed if warranted.      Sheila Barger CCC-SLP   5/31/2018

## 2018-05-31 NOTE — NURSING
Dr. Saloni Schultz to bedside changes made to external pacemaker for a back up rate of 50. Pt remains in NSR in 80s at this time. Will continue to monitor

## 2018-05-31 NOTE — PROGRESS NOTES
"[facility]  Endocrinology  Progress Note    Admit Date: 5/23/2018     Reason for Consult: Management of  Hyperglycemia     Surgical Procedure and Date: s/p CABG x 1, MVR  5/25/18    HPI: Patient is a 67 y.o. male with a diagnosis of mitral valve endocarditis and resultant severe MR, TR and pulmonary HTN. The etiology of his endocarditis remains unknown. He was admitted to the SICU pre-operatively for IABP placement 5/23/18. Underwent cardiac surgery 5/25/18.   No personal or family hx of DM  Lab Results   Component Value Date    HGBA1C 5.4 02/22/2018     Endocrine consulted for BG mgmt.             Interval HPI:   BG above goal.  On TF 50 cc/hr  No hypoglycemia.  Afebrile.  Npo.     BP (!) 98/55   Pulse 79   Temp 98.7 °F (37.1 °C) (Oral)   Resp (!) 28   Ht 5' 10" (1.778 m)   Wt 68.5 kg (151 lb 0.2 oz)   SpO2 95%   BMI 21.67 kg/m²       Labs Reviewed and Include      Recent Labs  Lab 05/31/18  0835   *   CALCIUM 10.2   ALBUMIN 3.3*   PROT 7.0   *   K 4.2  4.2   CO2 30*   *   *   CREATININE 2.1*   ALKPHOS 111   ALT 17   AST 52*   BILITOT 1.7*     Lab Results   Component Value Date    WBC 6.86 05/31/2018    HGB 7.5 (L) 05/31/2018    HCT 23.6 (L) 05/31/2018    MCV 96 05/31/2018    PLT 82 (L) 05/31/2018     No results for input(s): TSH, FREET4 in the last 168 hours.  Lab Results   Component Value Date    HGBA1C 5.4 02/22/2018       Nutritional status:   Body mass index is 21.67 kg/m².  Lab Results   Component Value Date    ALBUMIN 3.3 (L) 05/31/2018    ALBUMIN 3.0 (L) 05/31/2018    ALBUMIN 3.0 (L) 05/30/2018     Lab Results   Component Value Date    PREALBUMIN 12 (L) 02/22/2018       Estimated Creatinine Clearance: 33.1 mL/min (A) (based on SCr of 2.1 mg/dL (H)).    Accu-Checks  Recent Labs      05/30/18 2032 05/30/18   2215  05/31/18   0408  05/31/18   0814   POCTGLUCOSE  220*  170*  229*  192*       Current Medications and/or Treatments Impacting Glycemic " Control  Immunotherapy:  Immunosuppressants     None        Steroids:   Hormones     None        Pressors:    Autonomic Drugs     None        Previously novolog 2 units q4h  Low dose correction scale.  Hyperglycemia/Diabetes Medications: Antihyperglycemics     Start     Stop Route Frequency Ordered    06/01/18 0800  insulin aspart U-100 pen 3 Units      -- SubQ Every 24 hours (non-standard times) 05/31/18 1035    06/01/18 0400  insulin aspart U-100 pen 3 Units      -- SubQ Every 24 hours (non-standard times) 05/31/18 1035    05/31/18 2000  insulin aspart U-100 pen 3 Units      -- SubQ Every 24 hours (non-standard times) 05/31/18 1035    05/31/18 1600  insulin aspart U-100 pen 3 Units      -- SubQ Every 24 hours (non-standard times) 05/31/18 1035    05/31/18 1200  insulin aspart U-100 pen 3 Units      -- SubQ Every 24 hours (non-standard times) 05/31/18 1035    05/31/18 0000  insulin aspart U-100 pen 2 Units      -- SubQ Every 24 hours (non-standard times) 05/30/18 1810    05/30/18 1703  insulin aspart U-100 pen 0-5 Units      -- SubQ Every 4 hours PRN 05/30/18 1705          ASSESSMENT and PLAN    * S/P MVR (mitral valve replacement)    Per CTS  avoid hypoglycemia          Acute hyperglycemia    BG goal 110-140   Increase novolog to 3 units q4h w/ TF  Hold if TF is on hold  Low dose correction scale.    Please page endocrine NP on call with any BG related issues or concerns. Thanks.       Discharge plans-TBD             S/P CABG x 1    Per CTS  avoid hypoglycemia          Chronic systolic congestive heart failure    Per CTS  avoid hypoglycemia            Coronary artery disease of native artery of native heart with stable angina pectoris    Per CTS  avoid hypoglycemia              Anoop Silva, APRN, FNP  Endocrinology  [facility]

## 2018-05-31 NOTE — PLAN OF CARE
Problem: Occupational Therapy Goal  Goal: Occupational Therapy Goal  Goals to be met by: 6/10/18     Patient will increase functional independence with ADLs by performing:    Feeding with Modified [place].  UE Dressing with Supervision.  LE Dressing with Supervision.  Grooming while standing at sink with Supervision.  Toileting from toilet with Supervision for hygiene and clothing management.   Toilet transfer to toilet with Supervision.     Outcome: Ongoing (interventions implemented as appropriate)  OT re-eval completed, and above goals established. MILTON Olguin  5/31/2018

## 2018-05-31 NOTE — PT/OT/SLP RE-EVAL
"Occupational Therapy   Re-evaluation    Name: Rodolfo Messina  MRN: [id number]  Admitting Diagnosis:  S/P MVR (mitral valve replacement) 6 Days Post-Op; t/f'd back to ICU 5/30 for confusion/ANNA/closer monitoring    Recommendations:     Discharge Recommendations: home with home health  Discharge Equipment Recommendations:  none  Barriers to discharge:  None    History:     Past Medical History:   Diagnosis Date    ANNA (acute kidney injury) 2/22/2018    Anemia     Anxiety     Arthritis     BPH (benign prostatic hyperplasia)     CHF (congestive heart failure) 5/23/2018    Coronary artery disease of native artery of native heart with stable angina pectoris 5/21/2018    Diverticulosis     Encounter for blood transfusion     Hypertension     Urinary retention        Past Surgical History:   Procedure Laterality Date    APPENDECTOMY      COLONOSCOPY      COLONOSCOPY N/A 2/1/2018    Procedure: COLONOSCOPY;  Surgeon: Richar Payan MD;  Location: [facility] ([facility]);  Service: Endoscopy;  Laterality: N/A;  PM prep    CREATION OF AORTOCORONARY ARTERY BYPASS N/A 5/25/2018    Procedure: AORTOCORONARY BYPASS-CABG;  Surgeon: Marvin Go MD;  Location: [facility] OR [address] [place] [address];  Service: Cardiovascular;  Laterality: N/A;    EYE SURGERY Right     cataract extraction    FINGER SURGERY      FRACTURE SURGERY Right     index finger    MITRAL VALVE REPLACEMENT N/A 5/25/2018    Procedure: Mitral Valve Replacement;  Surgeon: Marvin Go MD;  Location: [facility] OR [address] [place] [address];  Service: Cardiovascular;  Laterality: N/A;    TONSILLECTOMY         Subjective     Chief Complaint: "Can we do this tomorrow? I don't feel good."  Patient/Family stated goals: to get better  Communicated with: RN prior to session.  Pain/Comfort:  · Pain Rating 1: 0/10  · Pain Rating Post-Intervention 1: 0/10    Objective:     Patient found with: blood pressure cuff, pulse ox (continuous), telemetry, NG tube, lackey catheter, central line (external " pacer)    General Precautions: Standard, fall, sternal, NPO, aspiration (bed alarm/chair alarm)   Orthopedic Precautions:    Braces:       Occupational Performance:    Bed Mobility:    · Patient completed Sit to Supine with minimum assistance    Functional Mobility/Transfers:  · Patient completed Sit <> Stand Transfer with minimum assistance  with  no assistive device  from EOB and b/s chair    Activities of Daily Living:  · Grooming: contact guard assistance standing at sink  · UB Dressing: moderate assistance    · LB Dressing: maximal assistance    · Toileting: maximal assistance      Cognitive/Visual Perceptual:  Oriented to: Person, Place, Time and Situation  Follows Commands/attention: Follows multistep  commands  Communication: clear/fluent  Memory:  No Deficits noted  Coping skills/emotional control: Appropriate to situation  Pt exhibits intermittent confusion and decreased safety awareness    Physical Exam:  Postural examination/scapula alignment:    -       No postural abnormalities identified  Sensation:    -       Intact  Upper Extremity Range of Motion:     -       Right Upper Extremity: WFL  -       Left Upper Extremity: WFL  Upper Extremity Strength:    -       Right Upper Extremity: WFL  -       Left Upper Extremity: WFL   Strength:    -       Right Upper Extremity: WFL  -       Left Upper Extremity: WFL  Fine Motor Coordination:    -       Intact  Gross motor coordination:   WFL    Patient left supine with all lines intact, call button in reach, bed alarm activated and RN notified    AMPAC 6 Click:  AMPAC Total Score: 14    Treatment & Education:  Pt ed on OT POC  Pt re-ed on sternal precautions during ADL  Pt completed functional mobility to/from sink with minimal A  RN requested that pt return back to bed at end of session  Education:    Assessment:     Rodolfo Messina is a 67 y.o. male with a medical diagnosis of S/P MVR (mitral valve replacement).  He presents back to ICU for closer  "monitoring.  Performance deficits affecting function are weakness, impaired self care skills, impaired balance, impaired functional mobilty, impaired endurance, gait instability, decreased upper extremity function, decreased safety awareness, impaired cardiopulmonary response to activity.      Rehab Prognosis:  Good; patient would benefit from acute skilled OT services to address these deficits and reach maximum level of function.         Clinical Decision Makin.  OT Low:  "Pt evaluation falls under low complexity for evaluation coding due to performance deficits noted in 1-3 areas as stated above and 0 co-morbities affecting current functional status. Data obtained from problem focused assessments. No modifications or assistance was required for completion of evaluation. Only brief occupational profile and history review completed."     Plan:     Patient to be seen 5 x/week to address the above listed problems via self-care/home management, therapeutic activities, therapeutic exercises  · Plan of Care Expires: 18  · Plan of Care Reviewed with: patient, spouse    This Plan of care has been discussed with the patient who was involved in its development and understands and is in agreement with the identified goals and treatment plan    GOALS:    Occupational Therapy Goals        Problem: Occupational Therapy Goal    Goal Priority Disciplines Outcome Interventions   Occupational Therapy Goal     OT, PT/OT Ongoing (interventions implemented as appropriate)    Description:  Goals to be met by: 6/10/18     Patient will increase functional independence with ADLs by performing:    Feeding with Modified [place].  UE Dressing with Supervision.  LE Dressing with Supervision.  Grooming while standing at sink with Supervision.  Toileting from toilet with Supervision for hygiene and clothing management.   Toilet transfer to toilet with Supervision.                       Time Tracking:     OT Date of Treatment: " 05/31/18  OT Start Time: 1105  OT Stop Time: 1123  OT Total Time (min): 18 min    Billable Minutes:Re-eval 8  Self Care/Home Management 10    MILTON Olguin  5/31/2018

## 2018-06-01 LAB
ALBUMIN SERPL BCP-MCNC: 2.8 G/DL
ALBUMIN SERPL BCP-MCNC: 2.8 G/DL
ALBUMIN SERPL BCP-MCNC: 2.9 G/DL
ALBUMIN SERPL BCP-MCNC: 3 G/DL
ALP SERPL-CCNC: 103 U/L
ALP SERPL-CCNC: 111 U/L
ALP SERPL-CCNC: 115 U/L
ALP SERPL-CCNC: 118 U/L
ALT SERPL W/O P-5'-P-CCNC: 26 U/L
ALT SERPL W/O P-5'-P-CCNC: 46 U/L
ALT SERPL W/O P-5'-P-CCNC: 77 U/L
ALT SERPL W/O P-5'-P-CCNC: 84 U/L
ANION GAP SERPL CALC-SCNC: 8 MMOL/L
ANION GAP SERPL CALC-SCNC: 9 MMOL/L
APTT BLDCRRT: 34.5 SEC
AST SERPL-CCNC: 105 U/L
AST SERPL-CCNC: 151 U/L
AST SERPL-CCNC: 151 U/L
AST SERPL-CCNC: 61 U/L
BASOPHILS # BLD AUTO: 0.01 K/UL
BASOPHILS NFR BLD: 0.1 %
BILIRUB SERPL-MCNC: 1.3 MG/DL
BILIRUB SERPL-MCNC: 1.4 MG/DL
BILIRUB SERPL-MCNC: 1.5 MG/DL
BILIRUB SERPL-MCNC: 1.5 MG/DL
BUN SERPL-MCNC: 109 MG/DL
BUN SERPL-MCNC: 111 MG/DL
BUN SERPL-MCNC: 112 MG/DL
BUN SERPL-MCNC: 112 MG/DL
CALCIUM SERPL-MCNC: 9.7 MG/DL
CALCIUM SERPL-MCNC: 9.7 MG/DL
CALCIUM SERPL-MCNC: 9.8 MG/DL
CALCIUM SERPL-MCNC: 9.8 MG/DL
CHLORIDE SERPL-SCNC: 116 MMOL/L
CO2 SERPL-SCNC: 29 MMOL/L
CO2 SERPL-SCNC: 31 MMOL/L
CREAT SERPL-MCNC: 1.7 MG/DL
CREAT SERPL-MCNC: 1.9 MG/DL
DIFFERENTIAL METHOD: ABNORMAL
EOSINOPHIL # BLD AUTO: 0.6 K/UL
EOSINOPHIL NFR BLD: 8.6 %
ERYTHROCYTE [DISTWIDTH] IN BLOOD BY AUTOMATED COUNT: 16.8 %
EST. GFR  (AFRICAN AMERICAN): 41.3 ML/MIN/1.73 M^2
EST. GFR  (AFRICAN AMERICAN): 47.2 ML/MIN/1.73 M^2
EST. GFR  (NON AFRICAN AMERICAN): 35.7 ML/MIN/1.73 M^2
EST. GFR  (NON AFRICAN AMERICAN): 40.8 ML/MIN/1.73 M^2
GLUCOSE SERPL-MCNC: 160 MG/DL
GLUCOSE SERPL-MCNC: 166 MG/DL
GLUCOSE SERPL-MCNC: 177 MG/DL
GLUCOSE SERPL-MCNC: 192 MG/DL
HCT VFR BLD AUTO: 24.3 %
HGB BLD-MCNC: 7.7 G/DL
IMM GRANULOCYTES # BLD AUTO: 0.05 K/UL
IMM GRANULOCYTES NFR BLD AUTO: 0.7 %
LYMPHOCYTES # BLD AUTO: 0.6 K/UL
LYMPHOCYTES NFR BLD: 9.1 %
MAGNESIUM SERPL-MCNC: 2.6 MG/DL
MAGNESIUM SERPL-MCNC: 2.7 MG/DL
MCH RBC QN AUTO: 30.8 PG
MCHC RBC AUTO-ENTMCNC: 31.7 G/DL
MCV RBC AUTO: 97 FL
MONOCYTES # BLD AUTO: 0.4 K/UL
MONOCYTES NFR BLD: 5.9 %
NEUTROPHILS # BLD AUTO: 5.2 K/UL
NEUTROPHILS NFR BLD: 75.6 %
NRBC BLD-RTO: 0 /100 WBC
PLATELET # BLD AUTO: 93 K/UL
PMV BLD AUTO: 12.8 FL
POCT GLUCOSE: 165 MG/DL (ref 70–110)
POCT GLUCOSE: 168 MG/DL (ref 70–110)
POCT GLUCOSE: 183 MG/DL (ref 70–110)
POCT GLUCOSE: 191 MG/DL (ref 70–110)
POCT GLUCOSE: 197 MG/DL (ref 70–110)
POTASSIUM SERPL-SCNC: 3.9 MMOL/L
POTASSIUM SERPL-SCNC: 4 MMOL/L
POTASSIUM SERPL-SCNC: 4 MMOL/L
POTASSIUM SERPL-SCNC: 4.1 MMOL/L
POTASSIUM SERPL-SCNC: 4.1 MMOL/L
POTASSIUM SERPL-SCNC: 4.2 MMOL/L
PROT SERPL-MCNC: 6.1 G/DL
PROT SERPL-MCNC: 6.2 G/DL
PROT SERPL-MCNC: 6.3 G/DL
PROT SERPL-MCNC: 6.6 G/DL
RBC # BLD AUTO: 2.5 M/UL
SODIUM SERPL-SCNC: 154 MMOL/L
SODIUM SERPL-SCNC: 155 MMOL/L
WBC # BLD AUTO: 6.94 K/UL

## 2018-06-01 PROCEDURE — 25000003 PHARM REV CODE 250: Performed by: NURSE PRACTITIONER

## 2018-06-01 PROCEDURE — 87040 BLOOD CULTURE FOR BACTERIA: CPT

## 2018-06-01 PROCEDURE — 99499 UNLISTED E&M SERVICE: CPT | Mod: ,,, | Performed by: PHYSICIAN ASSISTANT

## 2018-06-01 PROCEDURE — 63600175 PHARM REV CODE 636 W HCPCS: Performed by: PHYSICIAN ASSISTANT

## 2018-06-01 PROCEDURE — 25000003 PHARM REV CODE 250: Performed by: STUDENT IN AN ORGANIZED HEALTH CARE EDUCATION/TRAINING PROGRAM

## 2018-06-01 PROCEDURE — 85730 THROMBOPLASTIN TIME PARTIAL: CPT

## 2018-06-01 PROCEDURE — 93005 ELECTROCARDIOGRAM TRACING: CPT

## 2018-06-01 PROCEDURE — 63600175 PHARM REV CODE 636 W HCPCS: Performed by: STUDENT IN AN ORGANIZED HEALTH CARE EDUCATION/TRAINING PROGRAM

## 2018-06-01 PROCEDURE — 92526 ORAL FUNCTION THERAPY: CPT

## 2018-06-01 PROCEDURE — 83735 ASSAY OF MAGNESIUM: CPT

## 2018-06-01 PROCEDURE — 99900035 HC TECH TIME PER 15 MIN (STAT)

## 2018-06-01 PROCEDURE — 93010 ELECTROCARDIOGRAM REPORT: CPT | Mod: ,,, | Performed by: INTERNAL MEDICINE

## 2018-06-01 PROCEDURE — 25000003 PHARM REV CODE 250: Performed by: THORACIC SURGERY (CARDIOTHORACIC VASCULAR SURGERY)

## 2018-06-01 PROCEDURE — 94761 N-INVAS EAR/PLS OXIMETRY MLT: CPT

## 2018-06-01 PROCEDURE — 99223 1ST HOSP IP/OBS HIGH 75: CPT | Mod: ,,, | Performed by: INTERNAL MEDICINE

## 2018-06-01 PROCEDURE — 84132 ASSAY OF SERUM POTASSIUM: CPT | Mod: 91

## 2018-06-01 PROCEDURE — 85025 COMPLETE CBC W/AUTO DIFF WBC: CPT

## 2018-06-01 PROCEDURE — 83735 ASSAY OF MAGNESIUM: CPT | Mod: 91

## 2018-06-01 PROCEDURE — 99232 SBSQ HOSP IP/OBS MODERATE 35: CPT | Mod: ,,, | Performed by: NURSE PRACTITIONER

## 2018-06-01 PROCEDURE — 80053 COMPREHEN METABOLIC PANEL: CPT

## 2018-06-01 PROCEDURE — 84132 ASSAY OF SERUM POTASSIUM: CPT

## 2018-06-01 PROCEDURE — 25000003 PHARM REV CODE 250: Performed by: PHYSICIAN ASSISTANT

## 2018-06-01 PROCEDURE — A4216 STERILE WATER/SALINE, 10 ML: HCPCS | Performed by: THORACIC SURGERY (CARDIOTHORACIC VASCULAR SURGERY)

## 2018-06-01 PROCEDURE — 20000000 HC ICU ROOM

## 2018-06-01 PROCEDURE — 99233 SBSQ HOSP IP/OBS HIGH 50: CPT | Mod: ,,, | Performed by: ANESTHESIOLOGY

## 2018-06-01 RX ORDER — INSULIN ASPART 100 [IU]/ML
4 INJECTION, SOLUTION INTRAVENOUS; SUBCUTANEOUS
Status: DISCONTINUED | OUTPATIENT
Start: 2018-06-01 | End: 2018-06-02

## 2018-06-01 RX ORDER — INSULIN ASPART 100 [IU]/ML
4 INJECTION, SOLUTION INTRAVENOUS; SUBCUTANEOUS
Status: DISCONTINUED | OUTPATIENT
Start: 2018-06-02 | End: 2018-06-02

## 2018-06-01 RX ORDER — SYRING-NEEDL,DISP,INSUL,0.3 ML 29 G X1/2"
148 SYRINGE, EMPTY DISPOSABLE MISCELLANEOUS ONCE
Status: DISCONTINUED | OUTPATIENT
Start: 2018-06-01 | End: 2018-06-01

## 2018-06-01 RX ORDER — SYRING-NEEDL,DISP,INSUL,0.3 ML 29 G X1/2"
148 SYRINGE, EMPTY DISPOSABLE MISCELLANEOUS ONCE
Status: COMPLETED | OUTPATIENT
Start: 2018-06-01 | End: 2018-06-01

## 2018-06-01 RX ADMIN — INSULIN ASPART 3 UNITS: 100 INJECTION, SOLUTION INTRAVENOUS; SUBCUTANEOUS at 08:06

## 2018-06-01 RX ADMIN — ASPIRIN 81 MG 81 MG: 81 TABLET ORAL at 08:06

## 2018-06-01 RX ADMIN — ATORVASTATIN CALCIUM 40 MG: 20 TABLET, FILM COATED ORAL at 08:06

## 2018-06-01 RX ADMIN — INSULIN ASPART 3 UNITS: 100 INJECTION, SOLUTION INTRAVENOUS; SUBCUTANEOUS at 05:06

## 2018-06-01 RX ADMIN — AMLODIPINE BESYLATE 10 MG: 10 TABLET ORAL at 08:06

## 2018-06-01 RX ADMIN — AMPICILLIN 2 G: 2 INJECTION, POWDER, FOR SOLUTION INTRAVENOUS at 09:06

## 2018-06-01 RX ADMIN — DEXTROSE: 5 SOLUTION INTRAVENOUS at 11:06

## 2018-06-01 RX ADMIN — INSULIN ASPART 4 UNITS: 100 INJECTION, SOLUTION INTRAVENOUS; SUBCUTANEOUS at 04:06

## 2018-06-01 RX ADMIN — HYDRALAZINE HYDROCHLORIDE 20 MG: 20 INJECTION INTRAMUSCULAR; INTRAVENOUS at 09:06

## 2018-06-01 RX ADMIN — Medication 3 ML: at 05:06

## 2018-06-01 RX ADMIN — Medication 3 ML: at 02:06

## 2018-06-01 RX ADMIN — POLYETHYLENE GLYCOL 3350 17 G: 17 POWDER, FOR SOLUTION ORAL at 08:06

## 2018-06-01 RX ADMIN — WARFARIN SODIUM 3 MG: 2 TABLET ORAL at 06:06

## 2018-06-01 RX ADMIN — Medication 3 ML: at 10:06

## 2018-06-01 RX ADMIN — MAGESIUM CITRATE 148 ML: 1.75 LIQUID ORAL at 06:06

## 2018-06-01 RX ADMIN — DOXAZOSIN MESYLATE 4 MG: 2 TABLET ORAL at 08:06

## 2018-06-01 RX ADMIN — INSULIN ASPART 4 UNITS: 100 INJECTION, SOLUTION INTRAVENOUS; SUBCUTANEOUS at 12:06

## 2018-06-01 RX ADMIN — INSULIN ASPART 4 UNITS: 100 INJECTION, SOLUTION INTRAVENOUS; SUBCUTANEOUS at 08:06

## 2018-06-01 RX ADMIN — AMPICILLIN 2 G: 2 INJECTION, POWDER, FOR SOLUTION INTRAVENOUS at 05:06

## 2018-06-01 RX ADMIN — FAMOTIDINE 20 MG: 20 TABLET ORAL at 08:06

## 2018-06-01 RX ADMIN — CEFTRIAXONE SODIUM 2 G: 2 INJECTION, POWDER, FOR SOLUTION INTRAMUSCULAR; INTRAVENOUS at 05:06

## 2018-06-01 NOTE — ASSESSMENT & PLAN NOTE
BG goal 140-180 mg/dl  Change 0800, 12n, 1600, 2000 to 4 units of novolog scheduled w/ TF  Continue novolog 3 units at mn and 0400  Low dose correction scale  Hold if TF is on hold      Please page endocrine NP on call with any BG related issues or concerns. Thanks.       Discharge plans-TBD

## 2018-06-01 NOTE — PLAN OF CARE
Problem: Patient Care Overview  Goal: Plan of Care Review  Outcome: Ongoing (interventions implemented as appropriate)  VSS. Pt intermittently confused; currently A&Ox4; sleeping between care. D5W gtt at 50 ml/hr. Pt remains NPO with TF at 50 ml/hr; 300cc free water boluses q6h. U/O adequate. No acute events overnight. Plan of care reviewed with pt and spouse. Will continue to monitor closely.

## 2018-06-01 NOTE — CONSULTS
[facility]  Infectious Disease  Consult Note    Patient Name: Rodolfo Messina  MRN: [id number]  Admission Date: 5/23/2018  Hospital Length of Stay: 9 days  Attending Physician: Marvin Go MD  Primary Care Provider: Deric Claudio MD     Isolation Status: No active isolations    Patient information was obtained from patient and past medical records.      Consults  Assessment/Plan:     Endocarditis of mitral valve    67 year old male with history of E. faecalis mitral, aortic and tricuspid valve endocarditis and resultant severe MR treated with IV ampicillin and ceftriaxone for 6 weeks (end 4/12) admitted for planned valve surgery. Since stopping antibiotics he has felt well and has been without fevers, chills, night sweats.    Patient underwent bioprosthetic mitral valve replacement and CABG on 5/25. Per op note, extreme destruction of the mitral valve was noted along with large vegetations on the anterior and posterior leaflets. No surgical cultures or blood cultures obtained. He has been off antibiotics since surgery without fevers or a leukocytosis. ID consulted as pathology from the mitral leaflets returned with calcified valvular tissue with fibroid necrosis suggestive of endocarditis.      Plan  - Low likelihood that pathology results are representative of active endocarditis as patient successfully completed antibiotic treatment beforehand and has remained afebrile without a leukocytosis off appropriate antibiotic coverage. Feel this is more consistent with residual endocarditis. However, in the absence of blood and tissue cultures cannot rule this out completely. Will start Ampicillin and Ceftriaxone. Check blood cultures today.   - ID staff to further discuss case with CTS staff. Staff attestation to follow.             Thank you for the consult. Please call for any questions.  Divina Landeros PA-C  Phone: [phone]  Pager: [phone]    Subjective:     Principal Problem: S/P MVR  (mitral valve replacement)    HPI: Mr. Messina is a 67 year old male with history of E. 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 Since stopping antibiotics he has felt well and has been without fevers, chills, night sweats.    Patient underwent bioprosthetic mitral valve replacement and CABG on 5/25. Per op note, extreme destruction of the mitral valve was noted. Both anterior and posterior leaflets had large vegetations requiring almost near complete excision of the anterior and the posterior leaflets. No intervention was done to the aortic valve as insufficiency was mild. No surgical cultures or blood cultures obtained. He has been off antibiotics since surgery without fevers or a leukocytosis. ID consulted as pathology from the mitral leaflets returned with calcified valvular tissue with fibroid necrosis suggestive of endocarditis.         Past Medical History:   Diagnosis Date    ANNA (acute kidney injury) 2/22/2018    Anemia     Anxiety     Arthritis     BPH (benign prostatic hyperplasia)     CHF (congestive heart failure) 5/23/2018    Coronary artery disease of native artery of native heart with stable angina pectoris 5/21/2018    Diverticulosis     Encounter for blood transfusion     Hypertension     Urinary retention        Past Surgical History:   Procedure Laterality Date    APPENDECTOMY      COLONOSCOPY      COLONOSCOPY N/A 2/1/2018    Procedure: COLONOSCOPY;  Surgeon: Richar Payan MD;  Location: [facility] (4TH FLR);  Service: Endoscopy;  Laterality: N/A;  PM prep    CREATION OF AORTOCORONARY ARTERY BYPASS N/A 5/25/2018    Procedure: AORTOCORONARY BYPASS-CABG;  Surgeon: Marvin Go MD;  Location: [address];  Service: Cardiovascular;  Laterality: N/A;    EYE SURGERY Right     cataract  extraction    FINGER SURGERY      FRACTURE SURGERY Right     index finger    MITRAL VALVE REPLACEMENT N/A 5/25/2018    Procedure: Mitral Valve Replacement;  Surgeon: Marvin Go MD;  Location: [facility] OR [address] [place] [address];  Service: Cardiovascular;  Laterality: N/A;    TONSILLECTOMY         Review of patient's allergies indicates:   Allergen Reactions    Shellfish containing products     Augmentin [amoxicillin-pot clavulanate]      Patient felt that it raised BP and requested to have it added as intolerance. Tolerated unasyn and ampicillin.    Ciprofloxacin     Flagyl [metronidazole]     Lisinopril Other (See Comments)     cough    Mysoline [primidone]     Omnicef [cefdinir]        Medications:  Prescriptions Prior to Admission   Medication Sig    albuterol-ipratropium 2.5mg-0.5mg/3mL (DUO-NEB) 0.5 mg-3 mg(2.5 mg base)/3 mL nebulizer solution 3 mLs every 4 (four) hours as needed.     aspirin (ECOTRIN) 81 MG EC tablet Take 1 tablet (81 mg total) by mouth once daily.    atorvastatin (LIPITOR) 20 MG tablet Take 2 tablets (40 mg total) by mouth once daily.    benzonatate (TESSALON) 100 MG capsule Take 100 mg by mouth 3 (three) times daily as needed for Cough.    furosemide (LASIX) 40 MG tablet Take 1 tablet (40 mg total) by mouth once daily.    loratadine (CLARITIN) 10 mg tablet Take 10 mg by mouth once daily.    mirtazapine (REMERON) 30 MG tablet Take 1 tablet (30 mg total) by mouth every evening.    pantoprazole (PROTONIX) 40 MG tablet Take 1 tablet (40 mg total) by mouth once daily.    tamsulosin (FLOMAX) 0.4 mg Cp24 Take 2 capsules (0.8 mg total) by mouth 2 (two) times daily.    VENTOLIN HFA 90 mcg/actuation inhaler      Antibiotics     Start     Stop Route Frequency Ordered    06/01/18 1630  ampicillin 2 g in sodium chloride 0.9 % 100 mL IVPB (ready to mix system)      -- IV Every 6 hours (non-standard times) 06/01/18 1519    06/01/18 1630  cefTRIAXone (ROCEPHIN) 2 g in dextrose 5 % 50 mL IVPB      -- IV  Every 12 hours (non-standard times) 06/01/18 1519        Antifungals     None        Antivirals     None           Immunization History   Administered Date(s) Administered    Td - PF (ADULT) 08/24/2016       Family History     Problem Relation (Age of Onset)    Heart disease Mother, Father    No Known Problems Sister, Brother    Stroke Mother        Social History     Social History    Marital status:      Spouse name: N/A    Number of children: N/A    Years of education: N/A     Occupational History     [employer]     Social History Main Topics    Smoking status: Never Smoker    Smokeless tobacco: Never Used    Alcohol use No      Comment: none recently    Drug use: No    Sexual activity: Not Currently     Partners: Female     Other Topics Concern    None     Social History Narrative    None     Review of Systems   Constitutional: Negative for chills, diaphoresis, fatigue and fever.   HENT: Positive for nosebleeds. Negative for congestion.    Respiratory: Positive for shortness of breath. Negative for cough.    Cardiovascular: Negative for chest pain and palpitations.   Gastrointestinal: Negative for abdominal pain, constipation, diarrhea and nausea.   Genitourinary: Positive for difficulty urinating. Negative for dysuria, flank pain and hematuria.   Neurological: Positive for weakness. Negative for headaches.   Psychiatric/Behavioral: Negative for confusion. The patient is not nervous/anxious.      Objective:     Vital Signs (Most Recent):  Temp: 98.4 °F (36.9 °C) (06/01/18 1500)  Pulse: 79 (06/01/18 1600)  Resp: (!) 48 (06/01/18 1600)  BP: (!) 112/54 (06/01/18 1600)  SpO2: 95 % (06/01/18 1600) Vital Signs (24h Range):  Temp:  [98.1 °F (36.7 °C)-98.4 °F (36.9 °C)] 98.4 °F (36.9 °C)  Pulse:  [74-91] 79  Resp:  [14-48] 48  SpO2:  [94 %-100 %] 95 %  BP: ()/(52-93) 112/54     Weight: 69.3 kg (152 lb 12.5 oz)  Body mass index is 21.92 kg/m².    Estimated Creatinine Clearance: 41.3 mL/min (A)  (based on SCr of 1.7 mg/dL (H)).    Physical Exam   Constitutional: He is oriented to person, place, and time. He appears well-developed and well-nourished. No distress.   HENT:   Head: Normocephalic and atraumatic.   NG tube in place   Eyes: Conjunctivae are normal. No scleral icterus.   Cardiovascular: Normal rate.    Murmur heard.  Sternal incision c/d/i. No wound dehiscence. No purulent drainage, surrounding erythema, warmth or induration   Pulmonary/Chest: Effort normal. No respiratory distress.   Abdominal: Soft. He exhibits no distension. There is no tenderness.   Neurological: He is alert and oriented to person, place, and time.   Skin: Skin is warm and dry. No rash noted. He is not diaphoretic.   Psychiatric: He has a normal mood and affect. His behavior is normal.       Significant Labs:   Blood Culture:   Recent Labs  Lab 02/27/18  0555 03/01/18  1215 03/11/18  1428 03/11/18  1443 03/27/18  0623   LABBLOO Gram stain aer bottle: Gram positive cocci in chains resembling Strep   Results called to and read back by:Angel Hoyos RN 03/01/2018  08:27  ENTEROCOCCUS FAECALISFor susceptibility see order #[id number] No growth after 5 days. No growth after 5 days. No growth after 5 days. No growth after 5 days.  No growth after 5 days.     CBC:   Recent Labs  Lab 05/30/18  1659 05/31/18  0400 06/01/18  0657   WBC 5.62 6.86 6.94   HGB 7.4* 7.5* 7.7*   HCT 22.2* 23.6* 24.3*   PLT 78* 82* 93*     CMP:   Recent Labs  Lab 05/31/18  2329 06/01/18  0512 06/01/18  0814 06/01/18  1200   * 155*  --  154*   K 4.0 3.9 4.0 4.2   * 116*  --  116*   CO2 31* 31*  --  29   * 192*  --  166*   * 111*  --  112*   CREATININE 1.9* 1.7*  --  1.7*   CALCIUM 9.7 9.7  --  9.8   PROT 6.1 6.2  --  6.6   ALBUMIN 2.8* 2.8*  --  3.0*   BILITOT 1.4* 1.5*  --  1.3*   ALKPHOS 103 111  --  115   AST 61* 105*  --  151*   ALT 26 46*  --  77*   ANIONGAP 8 8  --  9   EGFRNONAA 35.7* 40.8*  --  40.8*     Fungus Culture (Blood  or Bone Marrow): No results for input(s): FUNGUSCULTUR in the last 4320 hours.  Hepatitis Panel: No results for input(s): HEPBSAG, HEPAIGM, HEPCAB in the last 48 hours.    Invalid input(s): HAPBIGM  Lactic Acid: No results for input(s): LACTATE in the last 48 hours.  Lipase: No results for input(s): LIPASE in the last 48 hours.  Prealbumin: No results for input(s): PREALBUMIN in the last 48 hours.  Procalcitonin: No results for input(s): PROCAL in the last 48 hours.  Quantiferon: No results for input(s): NIL, TBAG, TBAGNIL, MITOGENNIL, TBGOLD in the last 48 hours.  Respiratory Culture:   Recent Labs  Lab 02/25/18  0919 03/26/18  1225 03/27/18  1919   GSRESP Predominance of oropharyngeal wendie. Please recollect. >10epis/lfp and <than many WBC's  Predominance of oropharyngeal wendie. Please recollect. <10 epithelial cells per low power field.  Rare WBC's  Few Gram positive cocci  Few Gram negative rods  Rare Gram positive rods  Rare budding yeast   RESPIRATORYC Specimen inadequate - culture not performed Specimen inadequate - culture not performed Normal respiratory wendie     Urine Culture:   Recent Labs  Lab 02/14/18  1541 03/02/18 2125 03/27/18  0606   LABURIN PROTEUS MIRABILIS>100,000 cfu/ml No growth No growth     Urine Studies:   Recent Labs  Lab 03/02/18 2125 03/25/18  1527   COLORU Red* Straw   APPEARANCEUA Cloudy* Clear   PHUR 7.0 7.0   SPECGRAV 1.010 1.005   PROTEINUA 2+* Negative   GLUCUA Negative Negative   KETONESU Negative Negative   BILIRUBINUA Negative Negative   OCCULTUA 3+* 2+*   NITRITE Negative Negative   UROBILINOGEN Negative Negative   LEUKOCYTESUR Negative Negative   RBCUA >100* 12*   WBCUA 0 0   BACTERIA None Rare   SQUAMEPITHEL 0 0   HYALINECASTS 0  --      Wound Culture: No results for input(s): LABAERO in the last 4320 hours.    Significant Imaging: I have reviewed all pertinent imaging results/findings within the past 24 hours.

## 2018-06-01 NOTE — PROGRESS NOTES
" [facility]  Adult Nutrition  Progress Note    SUMMARY       Recommendations  Recommendation/Intervention:   1. Current TF exceeding EPN (142%), recommend modifying to Isosource 1.5 at a goal rate of 50 mL/hr + 2 packets/day Beneprotein - to provide 1875 kcal/day, 94 g protein/day, and 917 mL free fluid/day.   2. As medically able, ADAT to Cardiac with texutre per SLP.   RD to monitor.    Goals: Patient to receive nutrition by RD follow-up  Nutrition Goal Status: goal met  Communication of RD Recs:  (POC)    Reason for Assessment  Reason for Assessment: RD follow-up  Diagnosis: cardiac disease (s/p MVR & CABG 5/25)  Relevant Medical History: mitral valve endocarditis, severe MR, pulmonary HTN, CHF, diverticulosis  General Information Comments: Patient still with some confusion. SLP still recommending NPO. On TF, tolerating at goal rate.  Nutrition Discharge Planning: Unable to determine at this time.    Nutrition Risk Screen  Nutrition Risk Screen: no indicators present    Nutrition/Diet History  Do you have any cultural, spiritual, Synagogue conflicts, given your current situation?: none reported   Factors Affecting Nutritional Intake: NPO    Anthropometrics  Temp: 98.3 °F (36.8 °C)  Height Method: Stated  Height: 5' 10" (177.8 cm)  Height (inches): 70 in  Weight Method: Bed Scale  Weight: 69.3 kg (152 lb 12.5 oz)  Weight (lb): 152.78 lb  Ideal Body Weight (IBW), Male: 166 lb  % Ideal Body Weight, Male (lb): 96.82 lb  BMI (Calculated): 23.1  BMI Grade: 18.5-24.9 - normal    Lab/Procedures/Meds  Pertinent Labs Reviewed: reviewed  Pertinent Labs Comments: Na 155, Cl 116, , Creat 1.7, Glu 192, POCT GLu 165-212, Alb 2.8, T. bili 1.5  Pertinent Medications Reviewed: reviewed  Pertinent Medications Comments: famotidine, insulin, coumadin, IVF    Physical Findings/Assessment  Overall Physical Appearance: on oxygen therapy  Tubes: nasogastric tube, other (see comments) (chest tube)  Oral/Mouth " Cavity: tooth/teeth missing  Skin: incision(s)    Estimated/Assessed Needs  Weight Used For Calorie Calculations: 65.4 kg (144 lb 2.9 oz) (dosing weight)  Energy Calorie Requirements (kcal): 1794 kcal/day  Energy Need Method: Branch-St Jeor (x 1.25)  Protein Requirements: 79-92 g/day (1.2-1.4 g/kg)  Weight Used For Protein Calculations: 65.4 kg (144 lb 2.9 oz) (dosing weight)  Fluid Requirements (mL): 1 mL/kcal or per MD  Fluid Need Method: RDA Method  RDA Method (mL): 1794    Nutrition Prescription Ordered  Current Diet Order: NPO  Current Nutrition Support Formula Ordered: Impact Peptide 1.5  Current Nutrition Support Rate Ordered: 50 (ml)  Current Nutrition Support Frequency Ordered: mL/hr  Oral Nutrition Supplement: + 3 packets/day Beneprotein    Evaluation of Received Nutrient/Fluid Intake  Enteral Calories (kcal): 1800  Enteral Protein (gm): 113  Enteral (Free Water) Fluid (mL): 924  Other Calories (kcal): 75 (beneprotein)  Total Calories (kcal): 1875  % Kcal Needs: 105  Other Protein (gm): 18  Total Protein (gm): 131  % Protein Needs: 142  I/O: +1.8L x 24hrs, -4.5L since admit, good UOP  Energy Calories Required: meeting needs  Protein Required: exceed needs  Fluid Required:  (per MD)  Comments: LBM 5/29  Tolerance: tolerating  % Intake of Estimated Energy Needs: 75 - 100 %  % Meal Intake: NPO    Nutrition Risk  Level of Risk/Frequency of Follow-up: moderate (1x/week)     Assessment and Plan  * S/P MVR (mitral valve replacement)    Contributing Nutrition Diagnosis  Inadequate energy intake    Related to (etiology):   Decreased ability to consume sufficient energy    Signs and Symptoms (as evidenced by):   NPO and no alternative means of nutrition at this time     Nutrition Diagnosis Status:   Resolved          Monitor and Evaluation  Food and Nutrient Intake: energy intake, enteral nutrition intake  Food and Nutrient Adminstration: diet order, enteral and parenteral nutrition administration  Anthropometric  Measurements: weight, weight change, body mass index  Biochemical Data, Medical Tests and Procedures: electrolyte and renal panel, gastrointestinal profile, inflammatory profile  Nutrition-Focused Physical Findings: overall appearance     Nutrition Follow-Up  RD Follow-up?: Yes

## 2018-06-01 NOTE — PROGRESS NOTES
[facility]  Cardiothoracic Surgery  Progress Note    Patient Name: Rodolfo Messina  MRN: [id number]  Admission Date: 5/23/2018  Hospital Length of Stay: 9 days  Code Status: Full Code   Attending Physician: Marvin Go MD   Referring Provider: Marvin Go MD  Principal Problem:S/P MVR (mitral valve replacement)    Subjective:     Post-Op Info:  Procedure(s) (LRB):  Mitral Valve Replacement (N/A)  AORTOCORONARY BYPASS-CABG (N/A)   7 Days Post-Op     Interval History: No acute events overnight. Pathology for valve came back with active endocarditis; ID consulted. Tolerating TFs.    Medications:  Continuous Infusions:   dextrose 5 % 50 mL/hr at 06/01/18 1300     Scheduled Meds:   amLODIPine  10 mg Per NG tube Daily    aspirin  81 mg Per NG tube Daily    atorvastatin  40 mg Per NG tube Daily    doxazosin  4 mg Per NG tube Daily    famotidine  20 mg Per NG tube Daily    insulin aspart U-100  3 Units Subcutaneous Q24H    insulin aspart U-100  3 Units Subcutaneous Q24H    [START ON 6/2/2018] insulin aspart U-100  4 Units Subcutaneous Q24H    insulin aspart U-100  4 Units Subcutaneous Q24H    insulin aspart U-100  4 Units Subcutaneous Q24H    insulin aspart U-100  4 Units Subcutaneous Q24H    magnesium citrate  148 mL Per NG tube Once    polyethylene glycol  17 g Per NG tube Daily    sodium chloride 0.9%  3 mL Intravenous Q8H    warfarin  3 mg Per NG tube Daily     PRN Meds:albumin human 5%, albuterol-ipratropium, bisacodyl, dextrose 50%, glucagon (human recombinant), hydrALAZINE, insulin aspart U-100, lactated ringers, metoclopramide HCl, ondansetron, white petrolatum-mineral oil     Objective:     Vital Signs (Most Recent):  Temp: 98.3 °F (36.8 °C) (06/01/18 1100)  Pulse: 80 (06/01/18 1315)  Resp: (!) 36 (06/01/18 1315)  BP: (!) 121/58 (06/01/18 1300)  SpO2: 96 % (06/01/18 1315) Vital Signs (24h Range):  Temp:  [98.1 °F (36.7 °C)-98.4 °F (36.9 °C)] 98.3 °F (36.8 °C)  Pulse:   [74-91] 80  Resp:  [14-43] 36  SpO2:  [94 %-100 %] 96 %  BP: (116-144)/(55-93) 121/58     Weight: 69.3 kg (152 lb 12.5 oz)  Body mass index is 21.92 kg/m².    SpO2: 96 %  O2 Device (Oxygen Therapy): room air    Intake/Output - Last 3 Shifts       05/30 0700 - 05/31 0659 05/31 0700 - 06/01 0659 06/01 0700 - 06/02 0659    I.V. (mL/kg) 1042 (15.2) 1063 (15.3)     NG/GT 1880 2570 770    Total Intake(mL/kg) 2922 (42.7) 3633 (52.4) 770 (11.1)    Urine (mL/kg/hr) 1760 (1.1) 1930 (1.2) 620 (1.3)    Total Output 1760 1930 620    Net +1162 +1703 +150                 Lines/Drains/Airways     Peripherally Inserted Central Catheter Line                 PICC Double Lumen 03/05/18 1539 right basilic 87 days          Central Venous Catheter Line                 Percutaneous Central Line Insertion/Assessment - Quad lumen  05/25/18 0800 left subclavian 7 days          Drain                 NG/OG Tube 05/29/18 1609 nasogastric 16 Fr. Right nostril 2 days         Urethral Catheter 05/30/18 0445 Straight-tip 16 Fr. 2 days          Line                 Pacer Wires 05/25/18 1355 6 days          Peripheral Intravenous Line                 Peripheral IV - Single Lumen 05/29/18 1734 Right Forearm 2 days         Midline Catheter Insertion/Assessment  - Single Lumen 05/30/18 1623 Right basilic vein (medial side of arm) 18g x 8cm 1 day                Physical Exam   Constitutional: He appears well-developed.   Cardiovascular: Normal rate.    Incision c/d/i   Pulmonary/Chest: Effort normal.   Neurological: He is alert.   Skin: Skin is warm and dry.       Significant Labs:  CBC:   Recent Labs  Lab 06/01/18  0657   WBC 6.94   RBC 2.50*   HGB 7.7*   HCT 24.3*   PLT 93*   MCV 97   MCH 30.8   MCHC 31.7*     CMP:   Recent Labs  Lab 06/01/18  0512 06/01/18  0814   *  --    CALCIUM 9.7  --    ALBUMIN 2.8*  --    PROT 6.2  --    *  --    K 3.9 4.0   CO2 31*  --    *  --    *  --    CREATININE 1.7*  --    ALKPHOS 111  --    ALT  46*  --    *  --    BILITOT 1.5*  --      Assessment/Plan:     * S/P MVR (mitral valve replacement)    --Continue ASA, statin  --Sternal precautions  --PT/OT  --Ambulate x4   --Wean O2 as tolerated for O2 sat >92%  --monitor CXR    --Monitor electrolytes and replace prn  --hold Lasix  --D5W @50  -- Q6  --echo completed 5/30 to r/o tamponade- negative    --continue lackey for urinary retention and strict I&Os        DISPO: continue ICU care          Epistaxis    --Noted to have post traumatic left sided epistaxis following attempted NGT placement. On the time of evaluation patient was hemostatic. No active bleeding noted.     -Given that patient is hemostatic, no acute intervention   -Recommend minimize nasal trauma as best possible  -If requires oxygen, ideal if it is humidified via face tent and not nasal canula  -Bacitracin to bilateral nares nightly  -Afrin q6hrs PRN bleeding (only if ok from Cards prospective)  -Ocean nasal spray q6hrs while awake (starting in a couple days, allow stable clot to form for now)  -If bleeding restarts, hold pressure to fleshy portion of nose (10 mins) and lean forward  -For bleeding that does not stop, page resident  -Remainder of medical management per primary team        Atrial flutter    --EP following appreciate recs.   --holding BB  --external pacer set at 80        Chronic systolic congestive heart failure    - Modified barium swallow today  - Off epi; wean cardene as tolerated for MAP <80  - Daily labs  - Pain meds PRN  - ASA, statin  - D/c beta blocker  - EP following; appreciate recs  - Stepdown        ANNA (acute kidney injury)    --Cr bumped to 3.0 today. Holding all diuretics   --500ml Albumin given   --40ml/hr D5W1/2NS infusing   --CMP q6h             Vishnu Hawkins MD  Cardiothoracic Surgery  [facility]

## 2018-06-01 NOTE — ASSESSMENT & PLAN NOTE
--Continue ASA, statin  --Sternal precautions  --PT/OT  --Ambulate x4   --Wean O2 as tolerated for O2 sat >92%  --monitor CXR    --Monitor electrolytes and replace prn  --hold Lasix  --D5W @50  -- Q6  --echo completed 5/30 to r/o tamponade- negative    --continue lackey for urinary retention and strict I&Os        DISPO: continue ICU care

## 2018-06-01 NOTE — PLAN OF CARE
Problem: SLP Goal  Goal: SLP Goal  Speech Language Pathology Goals  Goals expected to be met by 6/7:  1. Pt will tolerate trials of puree w/o overt S/S aspiration, MIN A  2. Pt will tolerate trials of nectar-thickened liquids w/o overt S/S aspiration, MIN A  3. Educate Pt and family on safe swallow strategies and S/S aspiration  4. Continue to assess swallow to determine feasibility of PO upgrade       Goals expected to be met by 6/4: goals not met 2/2 transfer to ICU 5/30  1. Pt will participate in ongoing swallowing assessment to determine safest and least restrictive diet.         Outcome: Ongoing (interventions implemented as appropriate)  Pt was seen for ST session.     Virginia Mccall MS, CCC-SLP  Speech Language Pathologist  Pager: [phone]  6/1/2018

## 2018-06-01 NOTE — HPI
Mr. Messina is a 67 year old male with history of E. 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 Since stopping antibiotics he has felt well and has been without fevers, chills, night sweats.    Patient underwent bioprosthetic mitral valve replacement and CABG on 5/25. Per op note, extreme destruction of the mitral valve was noted. Both anterior and posterior leaflets had large vegetations requiring almost near complete excision of the anterior and the posterior leaflets. No intervention was done to the aortic valve as insufficiency was mild. No surgical cultures or blood cultures obtained. He has been off antibiotics since surgery without fevers or a leukocytosis. ID consulted as pathology from the mitral leaflets returned with calcified valvular tissue with fibroid necrosis suggestive of endocarditis.

## 2018-06-01 NOTE — ASSESSMENT & PLAN NOTE
Contributing Nutrition Diagnosis  Inadequate energy intake    Related to (etiology):   Decreased ability to consume sufficient energy    Signs and Symptoms (as evidenced by):   NPO and no alternative means of nutrition at this time     Nutrition Diagnosis Status:   Resolved

## 2018-06-01 NOTE — ASSESSMENT & PLAN NOTE
67 year old male with history of E. faecalis mitral, aortic and tricuspid valve endocarditis and resultant severe MR treated with IV ampicillin and ceftriaxone for 6 weeks (end 4/12) admitted for planned valve surgery. Since stopping antibiotics he has felt well and has been without fevers, chills, night sweats.    Patient underwent bioprosthetic mitral valve replacement and CABG on 5/25. Per op note, extreme destruction of the mitral valve was noted along with large vegetations on the anterior and posterior leaflets. No surgical cultures or blood cultures obtained. He has been off antibiotics since surgery without fevers or a leukocytosis. ID consulted as pathology from the mitral leaflets returned with calcified valvular tissue with fibroid necrosis suggestive of endocarditis.      Plan  - Low likelihood that pathology results are representative of active endocarditis as patient successfully completed antibiotic treatment beforehand and has remained afebrile without a leukocytosis off appropriate antibiotic coverage. Feel this is more consistent with residual endocarditis. However, in the absence of blood and tissue cultures cannot rule this out completely. Will start Ampicillin and Ceftriaxone. Check blood cultures today.   - ID staff to further discuss case with CTS staff. Staff attestation to follow.

## 2018-06-01 NOTE — SUBJECTIVE & OBJECTIVE
Interval History: No acute events overnight. Pathology for valve came back with active endocarditis; ID consulted. Tolerating TFs.    Medications:  Continuous Infusions:   dextrose 5 % 50 mL/hr at 06/01/18 1300     Scheduled Meds:   amLODIPine  10 mg Per NG tube Daily    aspirin  81 mg Per NG tube Daily    atorvastatin  40 mg Per NG tube Daily    doxazosin  4 mg Per NG tube Daily    famotidine  20 mg Per NG tube Daily    insulin aspart U-100  3 Units Subcutaneous Q24H    insulin aspart U-100  3 Units Subcutaneous Q24H    [START ON 6/2/2018] insulin aspart U-100  4 Units Subcutaneous Q24H    insulin aspart U-100  4 Units Subcutaneous Q24H    insulin aspart U-100  4 Units Subcutaneous Q24H    insulin aspart U-100  4 Units Subcutaneous Q24H    magnesium citrate  148 mL Per NG tube Once    polyethylene glycol  17 g Per NG tube Daily    sodium chloride 0.9%  3 mL Intravenous Q8H    warfarin  3 mg Per NG tube Daily     PRN Meds:albumin human 5%, albuterol-ipratropium, bisacodyl, dextrose 50%, glucagon (human recombinant), hydrALAZINE, insulin aspart U-100, lactated ringers, metoclopramide HCl, ondansetron, white petrolatum-mineral oil     Objective:     Vital Signs (Most Recent):  Temp: 98.3 °F (36.8 °C) (06/01/18 1100)  Pulse: 80 (06/01/18 1315)  Resp: (!) 36 (06/01/18 1315)  BP: (!) 121/58 (06/01/18 1300)  SpO2: 96 % (06/01/18 1315) Vital Signs (24h Range):  Temp:  [98.1 °F (36.7 °C)-98.4 °F (36.9 °C)] 98.3 °F (36.8 °C)  Pulse:  [74-91] 80  Resp:  [14-43] 36  SpO2:  [94 %-100 %] 96 %  BP: (116-144)/(55-93) 121/58     Weight: 69.3 kg (152 lb 12.5 oz)  Body mass index is 21.92 kg/m².    SpO2: 96 %  O2 Device (Oxygen Therapy): room air    Intake/Output - Last 3 Shifts       05/30 0700 - 05/31 0659 05/31 0700 - 06/01 0659 06/01 0700 - 06/02 0659    I.V. (mL/kg) 1042 (15.2) 1063 (15.3)     NG/GT 1880 2570 770    Total Intake(mL/kg) 2922 (42.7) 3633 (52.4) 770 (11.1)    Urine (mL/kg/hr) 1760 (1.1) 1930 (1.2)  620 (1.3)    Total Output 1760 1930 620    Net +1162 +1703 +150                 Lines/Drains/Airways     Peripherally Inserted Central Catheter Line                 PICC Double Lumen 03/05/18 1539 right basilic 87 days          Central Venous Catheter Line                 Percutaneous Central Line Insertion/Assessment - Quad lumen  05/25/18 0800 left subclavian 7 days          Drain                 NG/OG Tube 05/29/18 1609 nasogastric 16 Fr. Right nostril 2 days         Urethral Catheter 05/30/18 0445 Straight-tip 16 Fr. 2 days          Line                 Pacer Wires 05/25/18 1355 6 days          Peripheral Intravenous Line                 Peripheral IV - Single Lumen 05/29/18 1734 Right Forearm 2 days         Midline Catheter Insertion/Assessment  - Single Lumen 05/30/18 1623 Right basilic vein (medial side of arm) 18g x 8cm 1 day                Physical Exam   Constitutional: He appears well-developed.   Cardiovascular: Normal rate.    Incision c/d/i   Pulmonary/Chest: Effort normal.   Neurological: He is alert.   Skin: Skin is warm and dry.       Significant Labs:  CBC:   Recent Labs  Lab 06/01/18  0657   WBC 6.94   RBC 2.50*   HGB 7.7*   HCT 24.3*   PLT 93*   MCV 97   MCH 30.8   MCHC 31.7*     CMP:   Recent Labs  Lab 06/01/18  0512 06/01/18  0814   *  --    CALCIUM 9.7  --    ALBUMIN 2.8*  --    PROT 6.2  --    *  --    K 3.9 4.0   CO2 31*  --    *  --    *  --    CREATININE 1.7*  --    ALKPHOS 111  --    ALT 46*  --    *  --    BILITOT 1.5*  --

## 2018-06-01 NOTE — SUBJECTIVE & OBJECTIVE
"Interval HPI:   BG at or above goal.  Remains on tf, currently at 50 cc/hr.  Afebrile.  Npo.    BP (!) 120/58 (BP Location: Left arm, Patient Position: Sitting)   Pulse 83   Temp 98.3 °F (36.8 °C) (Oral)   Resp (!) 30   Ht 5' 10" (1.778 m)   Wt 69.3 kg (152 lb 12.5 oz)   SpO2 96%   BMI 21.92 kg/m²     Labs Reviewed and Include      Recent Labs  Lab 06/01/18  1200   *   CALCIUM 9.8   ALBUMIN 3.0*   PROT 6.6   *   K 4.2   CO2 29   *   *   CREATININE 1.7*   ALKPHOS 115   ALT 77*   *   BILITOT 1.3*     Lab Results   Component Value Date    WBC 6.94 06/01/2018    HGB 7.7 (L) 06/01/2018    HCT 24.3 (L) 06/01/2018    MCV 97 06/01/2018    PLT 93 (L) 06/01/2018     No results for input(s): TSH, FREET4 in the last 168 hours.  Lab Results   Component Value Date    HGBA1C 5.4 02/22/2018       Nutritional status:   Body mass index is 21.92 kg/m².  Lab Results   Component Value Date    ALBUMIN 3.0 (L) 06/01/2018    ALBUMIN 2.8 (L) 06/01/2018    ALBUMIN 2.8 (L) 05/31/2018     Lab Results   Component Value Date    PREALBUMIN 12 (L) 02/22/2018       Estimated Creatinine Clearance: 41.3 mL/min (A) (based on SCr of 1.7 mg/dL (H)).    Accu-Checks  Recent Labs      05/30/18   2215  05/31/18   0408  05/31/18   0814  05/31/18   1217  05/31/18   1612  05/31/18   2031  05/31/18   2328  06/01/18   0512  06/01/18   0814  06/01/18   1238   POCTGLUCOSE  170*  229*  192*  232*  212*  205*  197*  197*  165*  168*       Current Medications and/or Treatments Impacting Glycemic Control  Immunotherapy:  Immunosuppressants     None        Steroids:   Hormones     None        Pressors:    Autonomic Drugs     None        Previously   bg monitoring q4h  novolog 3 units q4h  Low dose correction scale  Hyperglycemia/Diabetes Medications: Antihyperglycemics     Start     Stop Route Frequency Ordered    06/02/18 0800  insulin aspart U-100 pen 4 Units      -- SubQ Every 24 hours (non-standard times) 06/01/18 0943    " 06/01/18 2000  insulin aspart U-100 pen 4 Units      -- SubQ Every 24 hours (non-standard times) 06/01/18 0943    06/01/18 1600  insulin aspart U-100 pen 4 Units      -- SubQ Every 24 hours (non-standard times) 06/01/18 0943    06/01/18 1200  insulin aspart U-100 pen 4 Units      -- SubQ Every 24 hours (non-standard times) 06/01/18 0943    06/01/18 0400  insulin aspart U-100 pen 3 Units      -- SubQ Every 24 hours (non-standard times) 05/31/18 1035    06/01/18 0000  insulin aspart U-100 pen 3 Units      -- SubQ Every 24 hours (non-standard times) 05/31/18 1045    05/30/18 1703  insulin aspart U-100 pen 0-5 Units      -- SubQ Every 4 hours PRN 05/30/18 1705

## 2018-06-01 NOTE — SUBJECTIVE & OBJECTIVE
Past Medical History:   Diagnosis Date    ANNA (acute kidney injury) 2/22/2018    Anemia     Anxiety     Arthritis     BPH (benign prostatic hyperplasia)     CHF (congestive heart failure) 5/23/2018    Coronary artery disease of native artery of native heart with stable angina pectoris 5/21/2018    Diverticulosis     Encounter for blood transfusion     Hypertension     Urinary retention        Past Surgical History:   Procedure Laterality Date    APPENDECTOMY      COLONOSCOPY      COLONOSCOPY N/A 2/1/2018    Procedure: COLONOSCOPY;  Surgeon: Richar Payan MD;  Location: [facility] (4TH FLR);  Service: Endoscopy;  Laterality: N/A;  PM prep    CREATION OF AORTOCORONARY ARTERY BYPASS N/A 5/25/2018    Procedure: AORTOCORONARY BYPASS-CABG;  Surgeon: Marvin Go MD;  Location: [facility] OR 2ND FLR;  Service: Cardiovascular;  Laterality: N/A;    EYE SURGERY Right     cataract extraction    FINGER SURGERY      FRACTURE SURGERY Right     index finger    MITRAL VALVE REPLACEMENT N/A 5/25/2018    Procedure: Mitral Valve Replacement;  Surgeon: Marvin Go MD;  [facility];  Service: Cardiovascular;  Laterality: N/A;    TONSILLECTOMY         Review of patient's allergies indicates:   Allergen Reactions    Shellfish containing products     Augmentin [amoxicillin-pot clavulanate]      Patient felt that it raised BP and requested to have it added as intolerance. Tolerated unasyn and ampicillin.    Ciprofloxacin     Flagyl [metronidazole]     Lisinopril Other (See Comments)     cough    Mysoline [primidone]     Omnicef [cefdinir]        Medications:  Prescriptions Prior to Admission   Medication Sig    albuterol-ipratropium 2.5mg-0.5mg/3mL (DUO-NEB) 0.5 mg-3 mg(2.5 mg base)/3 mL nebulizer solution 3 mLs every 4 (four) hours as needed.     aspirin (ECOTRIN) 81 MG EC tablet Take 1 tablet (81 mg total) by mouth once daily.    atorvastatin (LIPITOR) 20 MG tablet Take 2 tablets (40 mg total)  by mouth once daily.    benzonatate (TESSALON) 100 MG capsule Take 100 mg by mouth 3 (three) times daily as needed for Cough.    furosemide (LASIX) 40 MG tablet Take 1 tablet (40 mg total) by mouth once daily.    loratadine (CLARITIN) 10 mg tablet Take 10 mg by mouth once daily.    mirtazapine (REMERON) 30 MG tablet Take 1 tablet (30 mg total) by mouth every evening.    pantoprazole (PROTONIX) 40 MG tablet Take 1 tablet (40 mg total) by mouth once daily.    tamsulosin (FLOMAX) 0.4 mg Cp24 Take 2 capsules (0.8 mg total) by mouth 2 (two) times daily.    VENTOLIN HFA 90 mcg/actuation inhaler      Antibiotics     Start     Stop Route Frequency Ordered    06/01/18 1630  ampicillin 2 g in sodium chloride 0.9 % 100 mL IVPB (ready to mix system)      -- IV Every 6 hours (non-standard times) 06/01/18 1519    06/01/18 1630  cefTRIAXone (ROCEPHIN) 2 g in dextrose 5 % 50 mL IVPB      -- IV Every 12 hours (non-standard times) 06/01/18 1519        Antifungals     None        Antivirals     None           Immunization History   Administered Date(s) Administered    Td - PF (ADULT) 08/24/2016       Family History     Problem Relation (Age of Onset)    Heart disease Mother, Father    No Known Problems Sister, Brother    Stroke Mother        Social History     Social History    Marital status:      Spouse name: N/A    Number of children: N/A    Years of education: N/A     Occupational History     [employer]     Social History Main Topics    Smoking status: Never Smoker    Smokeless tobacco: Never Used    Alcohol use No      Comment: none recently    Drug use: No    Sexual activity: Not Currently     Partners: Female     Other Topics Concern    None     Social History Narrative    None     Review of Systems   Constitutional: Negative for chills, diaphoresis, fatigue and fever.   HENT: Positive for nosebleeds. Negative for congestion.    Respiratory: Positive for shortness of breath. Negative for cough.     Cardiovascular: Negative for chest pain and palpitations.   Gastrointestinal: Negative for abdominal pain, constipation, diarrhea and nausea.   Genitourinary: Positive for difficulty urinating. Negative for dysuria, flank pain and hematuria.   Neurological: Positive for weakness. Negative for headaches.   Psychiatric/Behavioral: Negative for confusion. The patient is not nervous/anxious.      Objective:     Vital Signs (Most Recent):  Temp: 98.4 °F (36.9 °C) (06/01/18 1500)  Pulse: 79 (06/01/18 1600)  Resp: (!) 48 (06/01/18 1600)  BP: (!) 112/54 (06/01/18 1600)  SpO2: 95 % (06/01/18 1600) Vital Signs (24h Range):  Temp:  [98.1 °F (36.7 °C)-98.4 °F (36.9 °C)] 98.4 °F (36.9 °C)  Pulse:  [74-91] 79  Resp:  [14-48] 48  SpO2:  [94 %-100 %] 95 %  BP: ()/(52-93) 112/54     Weight: 69.3 kg (152 lb 12.5 oz)  Body mass index is 21.92 kg/m².    Estimated Creatinine Clearance: 41.3 mL/min (A) (based on SCr of 1.7 mg/dL (H)).    Physical Exam   Constitutional: He is oriented to person, place, and time. He appears well-developed and well-nourished. No distress.   HENT:   Head: Normocephalic and atraumatic.   NG tube in place   Eyes: Conjunctivae are normal. No scleral icterus.   Cardiovascular: Normal rate.    Murmur heard.  Sternal incision c/d/i. No wound dehiscence. No purulent drainage, surrounding erythema, warmth or induration   Pulmonary/Chest: Effort normal. No respiratory distress.   Abdominal: Soft. He exhibits no distension. There is no tenderness.   Neurological: He is alert and oriented to person, place, and time.   Skin: Skin is warm and dry. No rash noted. He is not diaphoretic.   Psychiatric: He has a normal mood and affect. His behavior is normal.       Significant Labs:   Blood Culture:   Recent Labs  Lab 02/27/18  0555 03/01/18  1215 03/11/18  1428 03/11/18  1443 03/27/18  0623   LABBL Gram stain aer bottle: Gram positive cocci in chains resembling Strep   Results called to and read back by:Angel  Romain JEAN 03/01/2018  08:27  ENTEROCOCCUS FAECALISFor susceptibility see order #[id number] No growth after 5 days. No growth after 5 days. No growth after 5 days. No growth after 5 days.  No growth after 5 days.     CBC:   Recent Labs  Lab 05/30/18  1659 05/31/18  0400 06/01/18  0657   WBC 5.62 6.86 6.94   HGB 7.4* 7.5* 7.7*   HCT 22.2* 23.6* 24.3*   PLT 78* 82* 93*     CMP:   Recent Labs  Lab 05/31/18  2329 06/01/18  0512 06/01/18  0814 06/01/18  1200   * 155*  --  154*   K 4.0 3.9 4.0 4.2   * 116*  --  116*   CO2 31* 31*  --  29   * 192*  --  166*   * 111*  --  112*   CREATININE 1.9* 1.7*  --  1.7*   CALCIUM 9.7 9.7  --  9.8   PROT 6.1 6.2  --  6.6   ALBUMIN 2.8* 2.8*  --  3.0*   BILITOT 1.4* 1.5*  --  1.3*   ALKPHOS 103 111  --  115   AST 61* 105*  --  151*   ALT 26 46*  --  77*   ANIONGAP 8 8  --  9   EGFRNONAA 35.7* 40.8*  --  40.8*     Fungus Culture (Blood or Bone Marrow): No results for input(s): FUNGUSCULTUR in the last 4320 hours.  Hepatitis Panel: No results for input(s): HEPBSAG, HEPAIGM, HEPCAB in the last 48 hours.    Invalid input(s): HAPBIGM  Lactic Acid: No results for input(s): LACTATE in the last 48 hours.  Lipase: No results for input(s): LIPASE in the last 48 hours.  Prealbumin: No results for input(s): PREALBUMIN in the last 48 hours.  Procalcitonin: No results for input(s): PROCAL in the last 48 hours.  Quantiferon: No results for input(s): NIL, TBAG, TBAGNIL, MITOGENNIL, TBGOLD in the last 48 hours.  Respiratory Culture:   Recent Labs  Lab 02/25/18  0919 03/26/18  1225 03/27/18  1919   GSRESP Predominance of oropharyngeal wendie. Please recollect. >10epis/lfp and <than many WBC's  Predominance of oropharyngeal wendie. Please recollect. <10 epithelial cells per low power field.  Rare WBC's  Few Gram positive cocci  Few Gram negative rods  Rare Gram positive rods  Rare budding yeast   RESPIRATORYC Specimen inadequate - culture not performed Specimen inadequate  - culture not performed Normal respiratory wendie     Urine Culture:   Recent Labs  Lab 02/14/18  1541 03/02/18 2125 03/27/18  0606   LABURIN PROTEUS MIRABILIS>100,000 cfu/ml No growth No growth     Urine Studies:   Recent Labs  Lab 03/02/18 2125 03/25/18  1527   COLORU Red* Straw   APPEARANCEUA Cloudy* Clear   PHUR 7.0 7.0   SPECGRAV 1.010 1.005   PROTEINUA 2+* Negative   GLUCUA Negative Negative   KETONESU Negative Negative   BILIRUBINUA Negative Negative   OCCULTUA 3+* 2+*   NITRITE Negative Negative   UROBILINOGEN Negative Negative   LEUKOCYTESUR Negative Negative   RBCUA >100* 12*   WBCUA 0 0   BACTERIA None Rare   SQUAMEPITHEL 0 0   HYALINECASTS 0  --      Wound Culture: No results for input(s): LABAERO in the last 4320 hours.    Significant Imaging: I have reviewed all pertinent imaging results/findings within the past 24 hours.

## 2018-06-01 NOTE — PT/OT/SLP PROGRESS
Speech Language Pathology Treatment    Patient Name:  Rodolfo Messina   MRN:  [id number]  Admitting Diagnosis: S/P MVR (mitral valve replacement)    Recommendations:                 General Recommendations:  Dysphagia therapy & repeat MBSS Monday  Diet recommendations:  NPO, Liquid Diet Level: NPO   Aspiration Precautions: Puree for pleasure with chin tuck & 3 swallows per bolus. 2 oz per sitting, no more than 3 sittings per day. Discontinue if any s/s aspiration (coughing, throat clearing, wet vocal quality)   1 bite/sip at a time, HOB to 90 degrees, Monitor for s/s of aspiration, Puree for pleasure, Remain upright 30 minutes post meal, Small bites/sips and Strict aspiration precautions   General Precautions: Standard, aspiration, fall  Communication strategies:  none    Subjective   Awake & alert. Appears to be increased alertness & strength today. Seated in chair. Wife at bedside. NGT in place.     Pain/Comfort:  · Pain Rating 1: 0/10  · Pain Rating Post-Intervention 2: 0/10    Objective:     Has the patient been evaluated by SLP for swallowing?   Yes  Keep patient NPO? Yes   Current Respiratory Status: room air      Wife reports they have been practicing dysphagia exercises & pt is able to complete each IND'ly. SLP educated pt & wife regarding swallow function, results of MBSS, risks of aspiration, s/s aspiration, POC, etc. Both verbalized understanding. Pt is able to elicit strong cough, strong voicing & adequate volitional swallow. Pt tolerated bites of puree x5 utilizing chin tuck & 3 swallows per bolus with no overt s/s aspiration. Given sips of nectar x2 & honey x2 with utilization of strategies demonstrating consistent throat clearing followed by delayed coughing. SLP reviewed rec for purees for pleasure with strict precautions, reviewed with NSG as well. All verbalized understanding.     Assessment:     Rodolfo Messina is a 67 y.o. male with an SLP diagnosis of Dysphagia    Goals:    SLP Goals         Problem: SLP Goal    Goal Priority Disciplines Outcome   SLP Goal     SLP Ongoing (interventions implemented as appropriate)   Description:  Speech Language Pathology Goals  Goals expected to be met by 6/7:  1. Pt will tolerate trials of puree w/o overt S/S aspiration, MIN A  2. Pt will tolerate trials of nectar-thickened liquids w/o overt S/S aspiration, MIN A  3. Educate Pt and family on safe swallow strategies and S/S aspiration  4. Continue to assess swallow to determine feasibility of PO upgrade       Goals expected to be met by 6/4: goals not met 2/2 transfer to ICU 5/30  1. Pt will participate in ongoing swallowing assessment to determine safest and least restrictive diet.                          Plan:     · Patient to be seen:  5 x/week   · Plan of Care expires:  06/30/18  · Plan of Care reviewed with:  patient, spouse   · SLP Follow-Up:  Yes       Discharge recommendations:  home health speech therapy     Time Tracking:     SLP Treatment Date:   06/01/18  Speech Start Time:  1318  Speech Stop Time:  1341     Speech Total Time (min):  23 min    Billable Minutes: Treatment Swallowing Dysfunction 23    Virginia Mccall CCC-SLP  06/01/2018

## 2018-06-01 NOTE — SUBJECTIVE & OBJECTIVE
Interval History/Significant Events:     Per nursing reports patient remains intermittently confused and restless. Today on exam he was alert and oriented x 4 and resting comfortably.     Follow-up For: Procedure(s) (LRB):  Mitral Valve Replacement (N/A)  AORTOCORONARY BYPASS-CABG (N/A)    Post-Operative Day: 7 Days Post-Op    Objective:     Vital Signs (Most Recent):  Temp: 98.4 °F (36.9 °C) (06/01/18 0700)  Pulse: 88 (06/01/18 0730)  Resp: 15 (06/01/18 0730)  BP: 125/60 (06/01/18 0700)  SpO2: 99 % (06/01/18 0730) Vital Signs (24h Range):  Temp:  [97.9 °F (36.6 °C)-98.4 °F (36.9 °C)] 98.4 °F (36.9 °C)  Pulse:  [77-91] 88  Resp:  [14-41] 15  SpO2:  [94 %-100 %] 99 %  BP: ()/(55-81) 125/60     Weight: 69.3 kg (152 lb 12.5 oz)  Body mass index is 21.92 kg/m².      Intake/Output Summary (Last 24 hours) at 06/01/18 0744  Last data filed at 06/01/18 0700   Gross per 24 hour   Intake             3633 ml   Output             1805 ml   Net             1828 ml       Physical Exam   Constitutional: He is oriented to person, place, and time. He appears well-developed.   HENT:   Head: Normocephalic and atraumatic.   Eyes: EOM are normal. Pupils are equal, round, and reactive to light.   Neck: Normal range of motion. Neck supple.   Cardiovascular: Regular rhythm.    Paced at 80   Pulmonary/Chest:   Extubated, on nasal canula    Abdominal: Soft. Bowel sounds are normal. He exhibits no distension. There is no tenderness.   Neurological: He is alert and oriented to person, place, and time.   Mental status improving   Skin: Skin is warm and dry.       Vents:  Vent Mode: Spont (05/26/18 2025)  Ventilator Initiated: Yes (05/25/18 1509)  Set Rate: 0 bmp (05/26/18 2025)  Vt Set: 500 mL (05/26/18 2025)  Pressure Support: 10 cmH20 (05/26/18 2025)  PEEP/CPAP: 5 cmH20 (05/26/18 2025)  Oxygen Concentration (%): 24 (05/29/18 0402)  Peak Airway Pressure: 16 cmH2O (05/26/18 2025)  Plateau Pressure: 0 cmH20 (05/26/18 2025)  Total Ve: 11.8  mL (05/26/18 2025)  F/VT Ratio<105 (RSBI): (!) 32.76 (05/26/18 2025)    Lines/Drains/Airways     Peripherally Inserted Central Catheter Line                 PICC Double Lumen 03/05/18 1539 right basilic 87 days          Central Venous Catheter Line                 Percutaneous Central Line Insertion/Assessment - Quad lumen  05/25/18 0800 left subclavian 6 days          Drain                 NG/OG Tube 05/29/18 1609 nasogastric 16 Fr. Right nostril 2 days         Urethral Catheter 05/30/18 0445 Straight-tip 16 Fr. 2 days          Line                 Pacer Wires 05/25/18 1355 6 days          Peripheral Intravenous Line                 Peripheral IV - Single Lumen 05/29/18 1734 Right Forearm 2 days         Midline Catheter Insertion/Assessment  - Single Lumen 05/30/18 1623 Right basilic vein (medial side of arm) 18g x 8cm 1 day                Significant Labs:    CBC/Anemia Profile:    Recent Labs  Lab 05/30/18  1659 05/31/18  0400 06/01/18  0657   WBC 5.62 6.86 6.94   HGB 7.4* 7.5* 7.7*   HCT 22.2* 23.6* 24.3*   PLT 78* 82* 93*   MCV 94 96 97   RDW 16.2* 16.4* 16.8*        Chemistries:    Recent Labs  Lab 05/30/18  1659  05/31/18  0400  05/31/18  1606 05/31/18  2032 05/31/18  2329 06/01/18  0512   *  155*  < > 157*  < > 157*  157*  --  155* 155*   K 3.8  3.8  3.8  < > 4.2  < > 3.9 4.0 4.0 3.9   *  115*  < > 117*  < > 117*  --  116* 116*   CO2 32*  32*  < > 32*  < > 30*  --  31* 31*   *  119*  < > 112*  < > 119*  --  112* 111*   CREATININE 2.7*  2.7*  < > 2.2*  < > 2.1*  --  1.9* 1.7*   CALCIUM 9.7  9.7  < > 9.9  < > 10.0  --  9.7 9.7   ALBUMIN 3.3*  3.3*  < > 3.0*  < > 3.1*  --  2.8* 2.8*   PROT 6.6  6.6  < > 6.6  < > 6.8  --  6.1 6.2   BILITOT 1.9*  1.9*  < > 1.7*  < > 1.7*  --  1.4* 1.5*   ALKPHOS 92  92  < > 100  < > 115  --  103 111   ALT 15  15  < > 15  < > 19  --  26 46*   AST 62*  62*  < > 54*  < > 50*  --  61* 105*   MG 3.3*  --  3.1*  --   --  3.0*  --   --    PHOS  --   --   2.4*  --   --   --   --   --    < > = values in this interval not displayed.

## 2018-06-01 NOTE — PROGRESS NOTES
"[facility]  Endocrinology  Progress Note    Admit Date: 5/23/2018     Reason for Consult: Management of  Hyperglycemia     Surgical Procedure and Date: s/p CABG x 1, MVR  5/25/18    HPI: Patient is a 67 y.o. male with a diagnosis of mitral valve endocarditis and resultant severe MR, TR and pulmonary HTN. The etiology of his endocarditis remains unknown. He was admitted to the SICU pre-operatively for IABP placement 5/23/18. Underwent cardiac surgery 5/25/18.   No personal or family hx of DM  Lab Results   Component Value Date    HGBA1C 5.4 02/22/2018     Endocrine consulted for BG mgmt.             Interval HPI:   BG at or above goal.  Remains on tf, currently at 50 cc/hr.  Afebrile.  Npo.    BP (!) 120/58 (BP Location: Left arm, Patient Position: Sitting)   Pulse 83   Temp 98.3 °F (36.8 °C) (Oral)   Resp (!) 30   Ht 5' 10" (1.778 m)   Wt 69.3 kg (152 lb 12.5 oz)   SpO2 96%   BMI 21.92 kg/m²       Labs Reviewed and Include      Recent Labs  Lab 06/01/18  1200   *   CALCIUM 9.8   ALBUMIN 3.0*   PROT 6.6   *   K 4.2   CO2 29   *   *   CREATININE 1.7*   ALKPHOS 115   ALT 77*   *   BILITOT 1.3*     Lab Results   Component Value Date    WBC 6.94 06/01/2018    HGB 7.7 (L) 06/01/2018    HCT 24.3 (L) 06/01/2018    MCV 97 06/01/2018    PLT 93 (L) 06/01/2018     No results for input(s): TSH, FREET4 in the last 168 hours.  Lab Results   Component Value Date    HGBA1C 5.4 02/22/2018       Nutritional status:   Body mass index is 21.92 kg/m².  Lab Results   Component Value Date    ALBUMIN 3.0 (L) 06/01/2018    ALBUMIN 2.8 (L) 06/01/2018    ALBUMIN 2.8 (L) 05/31/2018     Lab Results   Component Value Date    PREALBUMIN 12 (L) 02/22/2018       Estimated Creatinine Clearance: 41.3 mL/min (A) (based on SCr of 1.7 mg/dL (H)).    Accu-Checks  Recent Labs      05/30/18   2215  05/31/18   0408  05/31/18   0814  05/31/18   1217  05/31/18   1612  05/31/18   2031  05/31/18   8988  " 06/01/18   0512  06/01/18   0814  06/01/18   1238   POCTGLUCOSE  170*  229*  192*  232*  212*  205*  197*  197*  165*  168*       Current Medications and/or Treatments Impacting Glycemic Control  Immunotherapy:  Immunosuppressants     None        Steroids:   Hormones     None        Pressors:    Autonomic Drugs     None        Previously   bg monitoring q4h  novolog 3 units q4h  Low dose correction scale  Hyperglycemia/Diabetes Medications: Antihyperglycemics     Start     Stop Route Frequency Ordered    06/02/18 0800  insulin aspart U-100 pen 4 Units      -- SubQ Every 24 hours (non-standard times) 06/01/18 0943    06/01/18 2000  insulin aspart U-100 pen 4 Units      -- SubQ Every 24 hours (non-standard times) 06/01/18 0943    06/01/18 1600  insulin aspart U-100 pen 4 Units      -- SubQ Every 24 hours (non-standard times) 06/01/18 0943    06/01/18 1200  insulin aspart U-100 pen 4 Units      -- SubQ Every 24 hours (non-standard times) 06/01/18 0943    06/01/18 0400  insulin aspart U-100 pen 3 Units      -- SubQ Every 24 hours (non-standard times) 05/31/18 1035    06/01/18 0000  insulin aspart U-100 pen 3 Units      -- SubQ Every 24 hours (non-standard times) 05/31/18 1045    05/30/18 1703  insulin aspart U-100 pen 0-5 Units      -- SubQ Every 4 hours PRN 05/30/18 1705          ASSESSMENT and PLAN    * S/P MVR (mitral valve replacement)    Per CTS  avoid hypoglycemia          Acute hyperglycemia    BG goal 140-180 mg/dl  Change 0800, 12n, 1600, 2000 to 4 units of novolog scheduled w/ TF  Continue novolog 3 units at mn and 0400  Low dose correction scale  Hold if TF is on hold      Please page endocrine NP on call with any BG related issues or concerns. Thanks.       Discharge plans-TBD             S/P CABG x 1    Per CTS  avoid hypoglycemia          Chronic systolic congestive heart failure    Per CTS  avoid hypoglycemia            Coronary artery disease of native artery of native heart with stable angina pectoris     Per CTS  avoid hypoglycemia              Anoop Silva, APRN, FNP  Endocrinology  [facility]

## 2018-06-01 NOTE — CONSULTS
[facility]  Infectious Disease  Consult Note    Patient Name: Rodolfo Messina  MRN: [id number]  Admission Date: 5/23/2018  Hospital Length of Stay: 9 days  Attending Physician: Marvin oG MD  Primary Care Provider: Deric Claudio MD        Inpatient consult to Infectious Diseases  Consult performed by: DIVINA LANDEROS  Consult ordered by: LOULOU CUMMINGS consult received. Chart being reviewed. Full consult note with recommendations to follow.      Thank you,  Divina Landeros PA-C  [phone]

## 2018-06-01 NOTE — PROGRESS NOTES
[facility]  Critical Care - Surgery  Progress Note    Patient Name: Rodolfo Messina  MRN: [id number]  Admission Date: 5/23/2018  Hospital Length of Stay: 9 days  Code Status: Full Code  Attending Provider: Marvin Go MD  Primary Care Provider: Deric Claudio MD   Principal Problem: S/P MVR (mitral valve replacement)    Subjective:     Hospital/ICU Course:  No notes on file    Interval History/Significant Events:     Per nursing reports patient remains intermittently confused and restless. Today on exam he was alert and oriented x 4 and resting comfortably.     Follow-up For: Procedure(s) (LRB):  Mitral Valve Replacement (N/A)  AORTOCORONARY BYPASS-CABG (N/A)    Post-Operative Day: 7 Days Post-Op    Objective:     Vital Signs (Most Recent):  Temp: 98.4 °F (36.9 °C) (06/01/18 0700)  Pulse: 88 (06/01/18 0730)  Resp: 15 (06/01/18 0730)  BP: 125/60 (06/01/18 0700)  SpO2: 99 % (06/01/18 0730) Vital Signs (24h Range):  Temp:  [97.9 °F (36.6 °C)-98.4 °F (36.9 °C)] 98.4 °F (36.9 °C)  Pulse:  [77-91] 88  Resp:  [14-41] 15  SpO2:  [94 %-100 %] 99 %  BP: ()/(55-81) 125/60     Weight: 69.3 kg (152 lb 12.5 oz)  Body mass index is 21.92 kg/m².      Intake/Output Summary (Last 24 hours) at 06/01/18 0744  Last data filed at 06/01/18 0700   Gross per 24 hour   Intake             3633 ml   Output             1805 ml   Net             1828 ml       Physical Exam   Constitutional: He is oriented to person, place, and time. He appears well-developed.   HENT:   Head: Normocephalic and atraumatic.   Eyes: EOM are normal. Pupils are equal, round, and reactive to light.   Neck: Normal range of motion. Neck supple.   Cardiovascular: Regular rhythm.    Paced at 80   Pulmonary/Chest:   Extubated, on nasal canula    Abdominal: Soft. Bowel sounds are normal. He exhibits no distension. There is no tenderness.   Neurological: He is alert and oriented to person, place, and time.   Mental status improving   Skin: Skin  is warm and dry.       Vents:  Vent Mode: Spont (05/26/18 2025)  Ventilator Initiated: Yes (05/25/18 1509)  Set Rate: 0 bmp (05/26/18 2025)  Vt Set: 500 mL (05/26/18 2025)  Pressure Support: 10 cmH20 (05/26/18 2025)  PEEP/CPAP: 5 cmH20 (05/26/18 2025)  Oxygen Concentration (%): 24 (05/29/18 0402)  Peak Airway Pressure: 16 cmH2O (05/26/18 2025)  Plateau Pressure: 0 cmH20 (05/26/18 2025)  Total Ve: 11.8 mL (05/26/18 2025)  F/VT Ratio<105 (RSBI): (!) 32.76 (05/26/18 2025)    Lines/Drains/Airways     Peripherally Inserted Central Catheter Line                 PICC Double Lumen 03/05/18 1539 right basilic 87 days          Central Venous Catheter Line                 Percutaneous Central Line Insertion/Assessment - Quad lumen  05/25/18 0800 left subclavian 6 days          Drain                 NG/OG Tube 05/29/18 1609 nasogastric 16 Fr. Right nostril 2 days         Urethral Catheter 05/30/18 0445 Straight-tip 16 Fr. 2 days          Line                 Pacer Wires 05/25/18 1355 6 days          Peripheral Intravenous Line                 Peripheral IV - Single Lumen 05/29/18 1734 Right Forearm 2 days         Midline Catheter Insertion/Assessment  - Single Lumen 05/30/18 1623 Right basilic vein (medial side of arm) 18g x 8cm 1 day                Significant Labs:    CBC/Anemia Profile:    Recent Labs  Lab 05/30/18 1659 05/31/18  0400 06/01/18  0657   WBC 5.62 6.86 6.94   HGB 7.4* 7.5* 7.7*   HCT 22.2* 23.6* 24.3*   PLT 78* 82* 93*   MCV 94 96 97   RDW 16.2* 16.4* 16.8*        Chemistries:    Recent Labs  Lab 05/30/18  1659 05/31/18  0400  05/31/18  1606 05/31/18  2032 05/31/18  2329 06/01/18  0512   *  155*  < > 157*  < > 157*  157*  --  155* 155*   K 3.8  3.8  3.8  < > 4.2  < > 3.9 4.0 4.0 3.9   *  115*  < > 117*  < > 117*  --  116* 116*   CO2 32*  32*  < > 32*  < > 30*  --  31* 31*   *  119*  < > 112*  < > 119*  --  112* 111*   CREATININE 2.7*  2.7*  < > 2.2*  < > 2.1*  --  1.9* 1.7*   CALCIUM  9.7  9.7  < > 9.9  < > 10.0  --  9.7 9.7   ALBUMIN 3.3*  3.3*  < > 3.0*  < > 3.1*  --  2.8* 2.8*   PROT 6.6  6.6  < > 6.6  < > 6.8  --  6.1 6.2   BILITOT 1.9*  1.9*  < > 1.7*  < > 1.7*  --  1.4* 1.5*   ALKPHOS 92  92  < > 100  < > 115  --  103 111   ALT 15  15  < > 15  < > 19  --  26 46*   AST 62*  62*  < > 54*  < > 50*  --  61* 105*   MG 3.3*  --  3.1*  --   --  3.0*  --   --    PHOS  --   --  2.4*  --   --   --   --   --    < > = values in this interval not displayed.      Assessment/Plan:     Chronic systolic congestive heart failure     66yo M with PMH of mitral valve endocarditis and resultant severe MR, TR and pulmonary HTN who is now s/p IABP placement (5/24) and s/pMVR and CABG 5/25.     Plan:     Neuro:   - Intermittent waxing and waning mental status. Recommend delirium precautions.   - AAOx4 this AM.   -Pain control per CTS     Pulmonary:   -Currently on RA.   - Continuous pulse ox.   - Supplement O2 if necessary to maintain sats.   - ABGs/CXR if distressed.        Cardiac:  -MAP goal >65  -Paced at 80  -Amlodipine daily  -Statin daily  -TTE revealing LA thrombus yesterday so cannot perform DCCV at this time  - EPS thinks amelia function may return in time, but recommended starting theophyline.   -Coumadin therapy.  -ASA  -Continuous cardiac monitoring     Renal:   -UOP adequate.   -Bun/Cr improving.   -Lasix gtt off.   -Flomax daily given home use.  - MIVF.      Fluids/Electrolytes/Nutrition/GI:   -Lasix gtt stopped, MIVF started  -Na remains elevated currently on free water boluses started @ 200cc QID  - NPO. Advance diet as able.   -TFs started  -replace lytes PRN  -Bowel regimen     Hematology/Oncology:  -H/H stable  - continue to monitor  -INR daily  -CBC daily     Infectious Disease:   -Afebrile  -WBC wnl  -CBC daily   -Abx: ancef x 5 doses, completed     Endocrine:  - No h/o DM or thyroid dysfunction  - Endo following for BG. Continue following recs.   - Glucose goal of  120-180     Dispo:  -CTS primary                           Critical care was time spent personally by me on the following activities: development of treatment plan with patient or surrogate and bedside caregivers, discussions with consultants, evaluation of patient's response to treatment, examination of patient, ordering and performing treatments and interventions, ordering and review of laboratory studies, ordering and review of radiographic studies, pulse oximetry, re-evaluation of patient's condition.  This critical care time did not overlap with that of any other provider or involve time for any procedures.     Patrick Hawkins MD  Critical Care - Surgery  [facility]

## 2018-06-01 NOTE — ASSESSMENT & PLAN NOTE
68yo M with PMH of mitral valve endocarditis and resultant severe MR, TR and pulmonary HTN who is now s/p IABP placement (5/24) and s/pMVR and CABG 5/25.     Plan:     Neuro:   - Intermittent waxing and waning mental status. Recommend delirium precautions.   - AAOx4 this AM.   -Pain control per CTS     Pulmonary:   -Currently on RA.   - Continuous pulse ox.   - Supplement O2 if necessary to maintain sats.   - ABGs/CXR if distressed.        Cardiac:  -MAP goal >65  -Paced at 80  -Amlodipine daily  -Statin daily  -TTE revealing LA thrombus yesterday so cannot perform DCCV at this time  - EPS thinks amelia function may return in time, but recommended starting theophyline.   -Coumadin therapy.  -ASA  -Continuous cardiac monitoring     Renal:   -UOP adequate.   -Bun/Cr improving.   -Lasix gtt off.   -Flomax daily given home use.  - MIVF.      Fluids/Electrolytes/Nutrition/GI:   -Lasix gtt stopped, MIVF started  -Na remains elevated currently on free water boluses started @ 200cc QID  - NPO. Advance diet as able.   -TFs started  -replace lytes PRN  -Bowel regimen     Hematology/Oncology:  -H/H stable  - continue to monitor  -INR daily  -CBC daily     Infectious Disease:   -Afebrile  -WBC wnl  -CBC daily   -Abx: ancef x 5 doses, completed     Endocrine:  - No h/o DM or thyroid dysfunction  - Endo following for BG. Continue following recs.   - Glucose goal of 120-180     Dispo:  -CTS primary

## 2018-06-02 LAB
ALBUMIN SERPL BCP-MCNC: 2.7 G/DL
ALBUMIN SERPL BCP-MCNC: 2.8 G/DL
ALP SERPL-CCNC: 107 U/L
ALP SERPL-CCNC: 109 U/L
ALP SERPL-CCNC: 110 U/L
ALP SERPL-CCNC: 113 U/L
ALT SERPL W/O P-5'-P-CCNC: 64 U/L
ALT SERPL W/O P-5'-P-CCNC: 68 U/L
ALT SERPL W/O P-5'-P-CCNC: 76 U/L
ALT SERPL W/O P-5'-P-CCNC: 80 U/L
ANION GAP SERPL CALC-SCNC: 7 MMOL/L
ANION GAP SERPL CALC-SCNC: 8 MMOL/L
APTT BLDCRRT: 36.1 SEC
AST SERPL-CCNC: 102 U/L
AST SERPL-CCNC: 127 U/L
AST SERPL-CCNC: 61 U/L
AST SERPL-CCNC: 74 U/L
BILIRUB SERPL-MCNC: 1 MG/DL
BILIRUB SERPL-MCNC: 1.1 MG/DL
BILIRUB SERPL-MCNC: 1.4 MG/DL
BILIRUB SERPL-MCNC: 1.4 MG/DL
BUN SERPL-MCNC: 106 MG/DL
BUN SERPL-MCNC: 108 MG/DL
BUN SERPL-MCNC: 110 MG/DL
BUN SERPL-MCNC: 112 MG/DL
CALCIUM SERPL-MCNC: 9.6 MG/DL
CALCIUM SERPL-MCNC: 9.7 MG/DL
CALCIUM SERPL-MCNC: 9.8 MG/DL
CALCIUM SERPL-MCNC: 9.9 MG/DL
CHLORIDE SERPL-SCNC: 114 MMOL/L
CHLORIDE SERPL-SCNC: 115 MMOL/L
CHLORIDE SERPL-SCNC: 116 MMOL/L
CHLORIDE SERPL-SCNC: 116 MMOL/L
CO2 SERPL-SCNC: 30 MMOL/L
CO2 SERPL-SCNC: 31 MMOL/L
CREAT SERPL-MCNC: 1.5 MG/DL
CREAT SERPL-MCNC: 1.5 MG/DL
CREAT SERPL-MCNC: 1.6 MG/DL
CREAT SERPL-MCNC: 1.6 MG/DL
EST. GFR  (AFRICAN AMERICAN): 50.8 ML/MIN/1.73 M^2
EST. GFR  (AFRICAN AMERICAN): 50.8 ML/MIN/1.73 M^2
EST. GFR  (AFRICAN AMERICAN): 54.9 ML/MIN/1.73 M^2
EST. GFR  (AFRICAN AMERICAN): 54.9 ML/MIN/1.73 M^2
EST. GFR  (NON AFRICAN AMERICAN): 43.9 ML/MIN/1.73 M^2
EST. GFR  (NON AFRICAN AMERICAN): 43.9 ML/MIN/1.73 M^2
EST. GFR  (NON AFRICAN AMERICAN): 47.5 ML/MIN/1.73 M^2
EST. GFR  (NON AFRICAN AMERICAN): 47.5 ML/MIN/1.73 M^2
GLUCOSE SERPL-MCNC: 176 MG/DL
GLUCOSE SERPL-MCNC: 184 MG/DL
GLUCOSE SERPL-MCNC: 192 MG/DL
GLUCOSE SERPL-MCNC: 200 MG/DL
INR PPP: 2.3
MAGNESIUM SERPL-MCNC: 2.3 MG/DL
MAGNESIUM SERPL-MCNC: 2.8 MG/DL
POCT GLUCOSE: 181 MG/DL (ref 70–110)
POCT GLUCOSE: 191 MG/DL (ref 70–110)
POCT GLUCOSE: 195 MG/DL (ref 70–110)
POCT GLUCOSE: 200 MG/DL (ref 70–110)
POCT GLUCOSE: 208 MG/DL (ref 70–110)
POCT GLUCOSE: 276 MG/DL (ref 70–110)
POTASSIUM SERPL-SCNC: 4 MMOL/L
POTASSIUM SERPL-SCNC: 4.1 MMOL/L
POTASSIUM SERPL-SCNC: 4.2 MMOL/L
POTASSIUM SERPL-SCNC: 4.2 MMOL/L
POTASSIUM SERPL-SCNC: 4.3 MMOL/L
POTASSIUM SERPL-SCNC: 4.3 MMOL/L
PROT SERPL-MCNC: 6 G/DL
PROT SERPL-MCNC: 6 G/DL
PROT SERPL-MCNC: 6.1 G/DL
PROT SERPL-MCNC: 6.2 G/DL
PROTHROMBIN TIME: 22.1 SEC
SODIUM SERPL-SCNC: 151 MMOL/L
SODIUM SERPL-SCNC: 152 MMOL/L
SODIUM SERPL-SCNC: 154 MMOL/L
SODIUM SERPL-SCNC: 154 MMOL/L

## 2018-06-02 PROCEDURE — 25000003 PHARM REV CODE 250: Performed by: THORACIC SURGERY (CARDIOTHORACIC VASCULAR SURGERY)

## 2018-06-02 PROCEDURE — 20000000 HC ICU ROOM

## 2018-06-02 PROCEDURE — 80053 COMPREHEN METABOLIC PANEL: CPT | Mod: 91

## 2018-06-02 PROCEDURE — 85610 PROTHROMBIN TIME: CPT

## 2018-06-02 PROCEDURE — 63600175 PHARM REV CODE 636 W HCPCS: Performed by: PHYSICIAN ASSISTANT

## 2018-06-02 PROCEDURE — 85730 THROMBOPLASTIN TIME PARTIAL: CPT

## 2018-06-02 PROCEDURE — 83735 ASSAY OF MAGNESIUM: CPT

## 2018-06-02 PROCEDURE — 94799 UNLISTED PULMONARY SVC/PX: CPT

## 2018-06-02 PROCEDURE — 99233 SBSQ HOSP IP/OBS HIGH 50: CPT | Mod: ,,, | Performed by: INTERNAL MEDICINE

## 2018-06-02 PROCEDURE — 99232 SBSQ HOSP IP/OBS MODERATE 35: CPT | Mod: ,,, | Performed by: ANESTHESIOLOGY

## 2018-06-02 PROCEDURE — 84132 ASSAY OF SERUM POTASSIUM: CPT

## 2018-06-02 PROCEDURE — A4216 STERILE WATER/SALINE, 10 ML: HCPCS | Performed by: THORACIC SURGERY (CARDIOTHORACIC VASCULAR SURGERY)

## 2018-06-02 PROCEDURE — 25000003 PHARM REV CODE 250: Performed by: PHYSICIAN ASSISTANT

## 2018-06-02 PROCEDURE — 97110 THERAPEUTIC EXERCISES: CPT

## 2018-06-02 PROCEDURE — 63600175 PHARM REV CODE 636 W HCPCS: Performed by: STUDENT IN AN ORGANIZED HEALTH CARE EDUCATION/TRAINING PROGRAM

## 2018-06-02 PROCEDURE — 97116 GAIT TRAINING THERAPY: CPT

## 2018-06-02 PROCEDURE — 94761 N-INVAS EAR/PLS OXIMETRY MLT: CPT

## 2018-06-02 PROCEDURE — 25000003 PHARM REV CODE 250: Performed by: NURSE PRACTITIONER

## 2018-06-02 RX ORDER — INSULIN ASPART 100 [IU]/ML
5 INJECTION, SOLUTION INTRAVENOUS; SUBCUTANEOUS
Status: DISCONTINUED | OUTPATIENT
Start: 2018-06-02 | End: 2018-06-05

## 2018-06-02 RX ORDER — MIRTAZAPINE 7.5 MG/1
7.5 TABLET, FILM COATED ORAL NIGHTLY
Status: DISCONTINUED | OUTPATIENT
Start: 2018-06-02 | End: 2018-06-06

## 2018-06-02 RX ORDER — INSULIN ASPART 100 [IU]/ML
5 INJECTION, SOLUTION INTRAVENOUS; SUBCUTANEOUS
Status: DISCONTINUED | OUTPATIENT
Start: 2018-06-03 | End: 2018-06-05

## 2018-06-02 RX ORDER — OXYCODONE HYDROCHLORIDE 5 MG/1
5 TABLET ORAL EVERY 6 HOURS PRN
Status: DISCONTINUED | OUTPATIENT
Start: 2018-06-02 | End: 2018-06-08

## 2018-06-02 RX ORDER — TAMSULOSIN HYDROCHLORIDE 0.4 MG/1
0.4 CAPSULE ORAL DAILY
Status: DISCONTINUED | OUTPATIENT
Start: 2018-06-02 | End: 2018-06-02

## 2018-06-02 RX ADMIN — INSULIN ASPART 5 UNITS: 100 INJECTION, SOLUTION INTRAVENOUS; SUBCUTANEOUS at 05:06

## 2018-06-02 RX ADMIN — ATORVASTATIN CALCIUM 40 MG: 20 TABLET, FILM COATED ORAL at 08:06

## 2018-06-02 RX ADMIN — DOXAZOSIN MESYLATE 4 MG: 2 TABLET ORAL at 08:06

## 2018-06-02 RX ADMIN — ASPIRIN 81 MG 81 MG: 81 TABLET ORAL at 08:06

## 2018-06-02 RX ADMIN — Medication 3 ML: at 09:06

## 2018-06-02 RX ADMIN — MIRTAZAPINE 7.5 MG: 7.5 TABLET ORAL at 09:06

## 2018-06-02 RX ADMIN — HYDRALAZINE HYDROCHLORIDE 20 MG: 20 INJECTION INTRAMUSCULAR; INTRAVENOUS at 06:06

## 2018-06-02 RX ADMIN — INSULIN ASPART 3 UNITS: 100 INJECTION, SOLUTION INTRAVENOUS; SUBCUTANEOUS at 12:06

## 2018-06-02 RX ADMIN — INSULIN ASPART 5 UNITS: 100 INJECTION, SOLUTION INTRAVENOUS; SUBCUTANEOUS at 09:06

## 2018-06-02 RX ADMIN — AMPICILLIN 2 G: 2 INJECTION, POWDER, FOR SOLUTION INTRAVENOUS at 04:06

## 2018-06-02 RX ADMIN — INSULIN ASPART 3 UNITS: 100 INJECTION, SOLUTION INTRAVENOUS; SUBCUTANEOUS at 04:06

## 2018-06-02 RX ADMIN — CEFTRIAXONE SODIUM 2 G: 2 INJECTION, POWDER, FOR SOLUTION INTRAMUSCULAR; INTRAVENOUS at 03:06

## 2018-06-02 RX ADMIN — POLYETHYLENE GLYCOL 3350 17 G: 17 POWDER, FOR SOLUTION ORAL at 08:06

## 2018-06-02 RX ADMIN — AMPICILLIN 2 G: 2 INJECTION, POWDER, FOR SOLUTION INTRAVENOUS at 09:06

## 2018-06-02 RX ADMIN — AMPICILLIN 2 G: 2 INJECTION, POWDER, FOR SOLUTION INTRAVENOUS at 11:06

## 2018-06-02 RX ADMIN — FAMOTIDINE 20 MG: 20 TABLET ORAL at 08:06

## 2018-06-02 RX ADMIN — INSULIN ASPART 5 UNITS: 100 INJECTION, SOLUTION INTRAVENOUS; SUBCUTANEOUS at 08:06

## 2018-06-02 RX ADMIN — AMLODIPINE BESYLATE 10 MG: 10 TABLET ORAL at 08:06

## 2018-06-02 RX ADMIN — WARFARIN SODIUM 3 MG: 2 TABLET ORAL at 04:06

## 2018-06-02 RX ADMIN — Medication 3 ML: at 06:06

## 2018-06-02 RX ADMIN — Medication 3 ML: at 02:06

## 2018-06-02 RX ADMIN — AMPICILLIN 2 G: 2 INJECTION, POWDER, FOR SOLUTION INTRAVENOUS at 05:06

## 2018-06-02 RX ADMIN — INSULIN ASPART 5 UNITS: 100 INJECTION, SOLUTION INTRAVENOUS; SUBCUTANEOUS at 12:06

## 2018-06-02 RX ADMIN — INSULIN ASPART 2 UNITS: 100 INJECTION, SOLUTION INTRAVENOUS; SUBCUTANEOUS at 12:06

## 2018-06-02 RX ADMIN — CEFTRIAXONE SODIUM 2 G: 2 INJECTION, POWDER, FOR SOLUTION INTRAMUSCULAR; INTRAVENOUS at 04:06

## 2018-06-02 NOTE — PT/OT/SLP PROGRESS
Physical Therapy Treatment    Patient Name:  Rodolfo Messina   MRN:  [id number]    Recommendations:     Discharge Recommendations:  home with home health   Discharge Equipment Recommendations: none   Barriers to discharge: None    Assessment:     Rodolfo Messina is a 67 y.o. male admitted with a medical diagnosis of S/P MVR (mitral valve replacement).  He presents with the following impairments/functional limitations:  impaired endurance, gait instability, impaired balance, weakness, impaired functional mobilty. Pt progressing towards goals, but not at PLOF. Pt tolerated session well but had increased anxiety with ambulation.  Pt is improving with therapy evidenced by increased gait distance. Recommend d/c to HH to maximize functional independence.      Rehab Prognosis:  good; patient would benefit from acute skilled PT services to address these deficits and reach maximum level of function.      Recent Surgery: Procedure(s) (LRB):  Mitral Valve Replacement (N/A)  AORTOCORONARY BYPASS-CABG (N/A) 8 Days Post-Op    Plan:     During this hospitalization, patient to be seen 5 x/week to address the above listed problems via gait training, therapeutic activities, therapeutic exercises, neuromuscular re-education  · Plan of Care Expires:  06/30/18   Plan of Care Reviewed with: patient    Subjective     Communicated with RN prior to session.  Patient found in chair upon PT entry to room, agreeable to treatment.      Chief Complaint: weakness  Patient comments/goals: to get better and return home   Pain/Comfort:  · Pain Rating 1: 0/10  · Pain Rating Post-Intervention 1: 0/10    Patients cultural, spiritual, Muslim conflicts given the current situation: none reported     Objective:     Patient found with: telemetry, pulse ox (continuous), blood pressure cuff, PICC line, central line, peripheral IV, NG tube, lackey catheter (TVP)     General Precautions: Standard, fall   Orthopedic Precautions:N/A   Braces: N/A      Functional Mobility:  · Bed Mobility: NT 2nd to pt found in chair   · Transfers:     · Sit to Stand:  contact guard assistance with no AD x 4 trials from chair   · Gait:   · ~20ft with CGA  · Seated rest break   · ~30ft with CGA  · Seated rest break  · ~20ft with CGA  · Pt with increased anxiety during ambulation, requiring max encouragement   · Decreased vitaly, decreased step length  · SOB; VSS  · No LOB  · Curing to increase step length       AM-PAC 6 CLICK MOBILITY  Turning over in bed (including adjusting bedclothes, sheets and blankets)?: 3  Sitting down on and standing up from a chair with arms (e.g., wheelchair, bedside commode, etc.): 3  Moving from lying on back to sitting on the side of the bed?: 3  Moving to and from a bed to a chair (including a wheelchair)?: 3  Need to walk in hospital room?: 3  Climbing 3-5 steps with a railing?: 1  Total Score: 16       Therapeutic Activities and Exercises:  Educated pt on PT role/POC  Educated pt on sternal precautions  Educated pt on safety with functional mobility   Pt verbalized understanding    B LE AROM sitting in chair  Ankle pumps x20 reps  LAQ's 2x10 reps  Marching 2x10 reps     Patient left up in chair with all lines intact, call button in reach, chair alarm on and RN notified..    GOALS:    Physical Therapy Goals        Problem: Physical Therapy Goal    Goal Priority Disciplines Outcome Goal Variances Interventions   Physical Therapy Goal     PT/OT, PT Ongoing (interventions implemented as appropriate)     Description:  Goals to be met by: 2018    Patient will increase functional independence with mobility by performin. Supine to sit with Supervision - not met  2. Sit to stand transfer with Supervision - not met  3. Bed to chair transfer with Supervision  - not met  4. Gait  x 250 feet with Supervision - not met  5. Lower extremity exercise program x20 reps per handout, with independence - met                        Time Tracking:     PT  Received On: 06/02/18  PT Start Time: 1008     PT Stop Time: 1047  PT Total Time (min): 39 min     Billable Minutes: Gait Training 23 and Therapeutic Exercise 15    Treatment Type: Treatment  PT/PTA: PT           Eneida Berrios PT, DPT  6/2/2018  [phone]

## 2018-06-02 NOTE — PROGRESS NOTES
[facility]  Critical Care - Surgery  Progress Note    Patient Name: Rodolfo Messina  MRN: [id number]  Admission Date: 5/23/2018  Hospital Length of Stay: 10 days  Code Status: Full Code  Attending Provider: Marvin Go MD  Primary Care Provider: Deric Claudio MD   Principal Problem: S/P MVR (mitral valve replacement)    Subjective:     Interval History/Significant Events: No acute events overnight, afebrile, vital signs stable. ID consulted yesterday given pathology from valve with active endocarditis. Blood cultures sent. Tolerating TFs without issue. 2L UOP overnight.     Follow-up For: Procedure(s) (LRB):  Mitral Valve Replacement (N/A)  AORTOCORONARY BYPASS-CABG (N/A)    Post-Operative Day: 8 Days Post-Op    Objective:     Vital Signs (Most Recent):  Temp: 97.9 °F (36.6 °C) (06/02/18 0700)  Pulse: 85 (06/02/18 0700)  Resp: (!) 22 (06/02/18 0700)  BP: (!) 124/58 (06/02/18 0700)  SpO2: 95 % (06/02/18 0700) Vital Signs (24h Range):  Temp:  [97.9 °F (36.6 °C)-98.7 °F (37.1 °C)] 97.9 °F (36.6 °C)  Pulse:  [] 85  Resp:  [14-48] 22  SpO2:  [90 %-99 %] 95 %  BP: ()/(52-93) 124/58     Weight: 69.3 kg (152 lb 12.5 oz)  Body mass index is 21.92 kg/m².      Intake/Output Summary (Last 24 hours) at 06/02/18 0736  Last data filed at 06/02/18 0700   Gross per 24 hour   Intake             3594 ml   Output             2055 ml   Net             1539 ml       Physical Exam   Constitutional: He appears well-developed.   Cardiovascular: Normal rate.    Incision c/d/i   Pulmonary/Chest: Effort normal.   Neurological: He is alert.   Skin: Skin is warm and dry.       Lines/Drains/Airways     Peripherally Inserted Central Catheter Line                 PICC Double Lumen 03/05/18 1539 right basilic 88 days          Central Venous Catheter Line                 Percutaneous Central Line Insertion/Assessment - Quad lumen  05/25/18 0800 left subclavian 7 days          Drain                 NG/OG Tube  05/29/18 1609 nasogastric 16 Fr. Right nostril 3 days         Urethral Catheter 05/30/18 0445 Straight-tip 16 Fr. 3 days          Line                 Pacer Wires 05/25/18 1355 7 days          Peripheral Intravenous Line                 Peripheral IV - Single Lumen 05/29/18 1734 Right Forearm 3 days         Midline Catheter Insertion/Assessment  - Single Lumen 05/30/18 1623 Right basilic vein (medial side of arm) 18g x 8cm 2 days                Significant Labs:    CBC/Anemia Profile:    Recent Labs  Lab 06/01/18  0657   WBC 6.94   HGB 7.7*   HCT 24.3*   PLT 93*   MCV 97   RDW 16.8*        Chemistries:    Recent Labs  Lab 05/31/18  2032  06/01/18  0814  06/01/18  1800 06/01/18  1949 06/02/18  0005 06/02/18  0352   NA  --   < >  --   < > 155*  --  152* 154*   K 4.0  < > 4.0  < > 4.1 4.1 4.2 4.1   CL  --   < >  --   < > 116*  --  115* 116*   CO2  --   < >  --   < > 31*  --  30* 31*   BUN  --   < >  --   < > 109*  --  110* 106*   CREATININE  --   < >  --   < > 1.7*  --  1.5* 1.5*   CALCIUM  --   < >  --   < > 9.8  --  9.6 9.7   ALBUMIN  --   < >  --   < > 2.9*  --  2.7* 2.7*   PROT  --   < >  --   < > 6.3  --  6.0 6.0   BILITOT  --   < >  --   < > 1.5*  --  1.4* 1.4*   ALKPHOS  --   < >  --   < > 118  --  113 109   ALT  --   < >  --   < > 84*  --  80* 76*   AST  --   < >  --   < > 151*  --  127* 102*   MG 3.0*  --  2.7*  --   --  2.6  --   --    < > = values in this interval not displayed.    Blood Culture:   Recent Labs  Lab 06/01/18  1440 06/01/18  1450   LABBLOO No Growth to date No Growth to date     Coagulation:   Recent Labs  Lab 06/02/18  0352   INR 2.3*   APTT 36.1*       Significant Imaging:  I have reviewed all pertinent imaging results/findings within the past 24 hours.    Assessment/Plan:     Chronic systolic congestive heart failure     66yo M with PMH of mitral valve endocarditis and resultant severe MR, TR and pulmonary HTN who is now s/p IABP placement (5/24) and s/pMVR and CABG  5/25.     Plan:     Neuro:   - Intermittent waxing and waning mental status. Recommend delirium precautions.   - AAOx4 this AM.   -Pain control per CTS     Pulmonary:   -Currently on RA.   - Continuous pulse ox.   - Supplement O2 if necessary to maintain sats.   - ABGs/CXR if distressed.        Cardiac:  -MAP goal >65  -Paced at 80  -Amlodipine daily  -Statin daily  -TTE revealing LA thrombus yesterday so cannot perform DCCV at this time  - EPS thinks amelia function may return in time, but recommended starting theophyline.   -Coumadin therapy.  -ASA  -Continuous cardiac monitoring  -Pathology from valve returned with active endocarditis, ID consulted     Renal:   -UOP adequate.   -Bun/Cr improving.   -Lasix gtt off.   -Flomax daily given home use.  - MIVF.      Fluids/Electrolytes/Nutrition/GI:   -Lasix gtt stopped, MIVF started  -Na remains elevated currently on free water boluses started @ 200cc QID  - NPO. Advance diet as able.   -TFs well tolerated  -replace lytes PRN  -Bowel regimen     Hematology/Oncology:  -H/H stable  - continue to monitor  -INR daily  -CBC daily     Infectious Disease:   -Afebrile  -WBC wnl  -CBC daily   -Pathology from valve with active endocarditis, ID consulted, recommend ceftriaxone and ampicillin and PICC placement for long term abx administration  -Abx: Ceftriaxone and Ampicillin     Endocrine:  - No h/o DM or thyroid dysfunction  - Endo following for BG. Continue following recs.   - Glucose goal of 120-180     Dispo:  -CTS primary                    Critical care was time spent personally by me on the following activities: development of treatment plan with patient or surrogate and bedside caregivers, discussions with consultants, evaluation of patient's response to treatment, examination of patient, ordering and performing treatments and interventions, ordering and review of laboratory studies, ordering and review of radiographic studies, pulse oximetry, re-evaluation of patient's  condition.  This critical care time did not overlap with that of any other provider or involve time for any procedures.     Pepper Chadwick MD  Critical Care - Surgery  [facility]

## 2018-06-02 NOTE — PLAN OF CARE
Problem: Physical Therapy Goal  Goal: Physical Therapy Goal  Goals to be met by: 2018    Patient will increase functional independence with mobility by performin. Supine to sit with Supervision - not met  2. Sit to stand transfer with Supervision - not met  3. Bed to chair transfer with Supervision  - not met  4. Gait  x 250 feet with Supervision - not met  5. Lower extremity exercise program x20 reps per handout, with independence - met      Outcome: Ongoing (interventions implemented as appropriate)  Treatment completed and some goals met. Goals appropriate.

## 2018-06-02 NOTE — NURSING
Dr. Torres notified of a fib with frequent PVCs, HR reading 140s on the monitor but closer to 110 on palpation.  EKG ordered. Will continue monitor.

## 2018-06-02 NOTE — SUBJECTIVE & OBJECTIVE
Interval History: NAEON. Remains afebrile. Blood cx negative. Without new complaints today other than asking if his lackey catheter can be removed. Discussed antibiotic plan with patient and his wife.    Review of Systems   Constitutional: Negative for chills, diaphoresis, fatigue and fever.   HENT: Positive for nosebleeds. Negative for congestion.    Respiratory: Positive for shortness of breath. Negative for cough.    Cardiovascular: Negative for chest pain and palpitations.   Gastrointestinal: Negative for abdominal pain, constipation, diarrhea and nausea.   Genitourinary: Positive for difficulty urinating. Negative for dysuria, flank pain and hematuria.   Neurological: Positive for weakness. Negative for headaches.   Psychiatric/Behavioral: Negative for confusion. The patient is not nervous/anxious.      Objective:     Vital Signs (Most Recent):  Temp: 97.9 °F (36.6 °C) (06/02/18 0700)  Pulse: 80 (06/02/18 0900)  Resp: 20 (06/02/18 0900)  BP: 124/60 (06/02/18 0900)  SpO2: 95 % (06/02/18 0900) Vital Signs (24h Range):  Temp:  [97.9 °F (36.6 °C)-98.7 °F (37.1 °C)] 97.9 °F (36.6 °C)  Pulse:  [] 80  Resp:  [14-48] 20  SpO2:  [90 %-99 %] 95 %  BP: ()/(52-72) 124/60     Weight: 69.3 kg (152 lb 12.5 oz)  Body mass index is 21.92 kg/m².    Estimated Creatinine Clearance: 43.9 mL/min (A) (based on SCr of 1.6 mg/dL (H)).    Physical Exam   Constitutional: He is oriented to person, place, and time. He appears well-developed and well-nourished. No distress.   HENT:   Head: Normocephalic and atraumatic.   NG tube in place   Eyes: Conjunctivae are normal. No scleral icterus.   Cardiovascular: Normal rate.    Murmur heard.  Sternal incision c/d/i. No wound dehiscence. No purulent drainage, surrounding erythema, warmth or induration   Pulmonary/Chest: Effort normal. No respiratory distress.   Abdominal: Soft. He exhibits no distension. There is no tenderness.   Genitourinary:   Genitourinary Comments: Lackey catheter  in place with clear yellow urine in bag   Neurological: He is alert and oriented to person, place, and time.   Skin: Skin is warm and dry. No rash noted. He is not diaphoretic.   Psychiatric: He has a normal mood and affect. His behavior is normal.       Significant Labs: All pertinent labs within the past 24 hours have been reviewed.    Significant Imaging: I have reviewed all pertinent imaging results/findings within the past 24 hours.

## 2018-06-02 NOTE — PROGRESS NOTES
[facility]  Infectious Disease  Progress Note    Patient Name: Rodolfo Messina  MRN: [id number]  Admission Date: 5/23/2018  Length of Stay: 10 days  Attending Physician: Marvin Go MD  Primary Care Provider: Deric Claudio MD    Isolation Status: No active isolations  Assessment/Plan:      Endocarditis of mitral valve    67 year old male with history of E. faecalis mitral, aortic and tricuspid valve endocarditis and resultant severe MR treated with IV ampicillin and ceftriaxone for 6 weeks (end 4/12) admitted for planned valve surgery.     Patient underwent bioprosthetic mitral valve replacement and CABG on 5/25. Per op note, extreme destruction of the mitral valve was noted along with large vegetations on the anterior and posterior leaflets. No surgical cultures or blood cultures obtained. He has been off antibiotics since surgery without fevers or a leukocytosis. ID consulted as pathology from the mitral leaflets returned with calcified valvular tissue with fibroid necrosis suggestive of endocarditis.      Recommendations  - His pathology is concerning that he may have subacute endocarditis and that the other valves may be also be infected in the presence of a new prosthetic valve. There are no culture data to support or refute this possibility, however.  - Continue Ampicillin and Ceftriaxone.  - Follow up blood cultures to ensure sterile.   - Anticipate 6 weeks of IV antibiotics for endocarditis.  - Discussed plan with patient and his wife. ID will follow through tomorrow.            Please call for any questions. Thank you.  Divina Landeros PA-C  Phone: [phone]  Pager: [phone]    Subjective:     Principal Problem:S/P MVR (mitral valve replacement)    HPI: Mr. Messina is a 67 year old male with history of E. faecalis mitral, aortic and tricuspid valve endocarditis and resultant severe MR, TR and pulmonary HTN treated with IV ampicillin and IV ceftriaxone for 6 weeks (end 4/12) admitted  5/23 for MVR, TV repair, AVR and CABG planned for 5/25. Since stopping antibiotics he has felt well and has been without fevers, chills, night sweats.    Patient underwent bioprosthetic mitral valve replacement and CABG on 5/25. Per op note, extreme destruction of the mitral valve was noted. Both anterior and posterior leaflets had large vegetations requiring almost near complete excision of the anterior and the posterior leaflets. No intervention was done to the aortic valve as insufficiency was mild. No surgical cultures or blood cultures obtained. He has been off antibiotics since surgery without fevers or a leukocytosis. ID consulted as pathology from the mitral leaflets returned with calcified valvular tissue with fibroid necrosis suggestive of endocarditis.       Interval History: NAEON. Remains afebrile. Blood cx negative. Without new complaints today other than asking if his lackey catheter can be removed. Discussed antibiotic plan with patient and his wife.    Review of Systems   Constitutional: Negative for chills, diaphoresis, fatigue and fever.   HENT: Positive for nosebleeds. Negative for congestion.    Respiratory: Positive for shortness of breath. Negative for cough.    Cardiovascular: Negative for chest pain and palpitations.   Gastrointestinal: Negative for abdominal pain, constipation, diarrhea and nausea.   Genitourinary: Positive for difficulty urinating. Negative for dysuria, flank pain and hematuria.   Neurological: Positive for weakness. Negative for headaches.   Psychiatric/Behavioral: Negative for confusion. The patient is not nervous/anxious.      Objective:     Vital Signs (Most Recent):  Temp: 97.9 °F (36.6 °C) (06/02/18 0700)  Pulse: 80 (06/02/18 0900)  Resp: 20 (06/02/18 0900)  BP: 124/60 (06/02/18 0900)  SpO2: 95 % (06/02/18 0900) Vital Signs (24h Range):  Temp:  [97.9 °F (36.6 °C)-98.7 °F (37.1 °C)] 97.9 °F (36.6 °C)  Pulse:  [] 80  Resp:  [14-48] 20  SpO2:  [90 %-99 %] 95 %  BP:  ()/(52-72) 124/60     Weight: 69.3 kg (152 lb 12.5 oz)  Body mass index is 21.92 kg/m².    Estimated Creatinine Clearance: 43.9 mL/min (A) (based on SCr of 1.6 mg/dL (H)).    Physical Exam   Constitutional: He is oriented to person, place, and time. He appears well-developed and well-nourished. No distress.   HENT:   Head: Normocephalic and atraumatic.   NG tube in place   Eyes: Conjunctivae are normal. No scleral icterus.   Cardiovascular: Normal rate.    Murmur heard.  Sternal incision c/d/i. No wound dehiscence. No purulent drainage, surrounding erythema, warmth or induration   Pulmonary/Chest: Effort normal. No respiratory distress.   Abdominal: Soft. He exhibits no distension. There is no tenderness.   Genitourinary:   Genitourinary Comments: Lainez catheter in place with clear yellow urine in bag   Neurological: He is alert and oriented to person, place, and time.   Skin: Skin is warm and dry. No rash noted. He is not diaphoretic.   Psychiatric: He has a normal mood and affect. His behavior is normal.       Significant Labs: All pertinent labs within the past 24 hours have been reviewed.    Significant Imaging: I have reviewed all pertinent imaging results/findings within the past 24 hours.

## 2018-06-02 NOTE — SUBJECTIVE & OBJECTIVE
Interval History/Significant Events: No acute events overnight, afebrile, vital signs stable. ID consulted yesterday given pathology from valve with active endocarditis. Blood cultures sent. Tolerating TFs without issue. 2L UOP overnight.     Follow-up For: Procedure(s) (LRB):  Mitral Valve Replacement (N/A)  AORTOCORONARY BYPASS-CABG (N/A)    Post-Operative Day: 8 Days Post-Op    Objective:     Vital Signs (Most Recent):  Temp: 97.9 °F (36.6 °C) (06/02/18 0700)  Pulse: 85 (06/02/18 0700)  Resp: (!) 22 (06/02/18 0700)  BP: (!) 124/58 (06/02/18 0700)  SpO2: 95 % (06/02/18 0700) Vital Signs (24h Range):  Temp:  [97.9 °F (36.6 °C)-98.7 °F (37.1 °C)] 97.9 °F (36.6 °C)  Pulse:  [] 85  Resp:  [14-48] 22  SpO2:  [90 %-99 %] 95 %  BP: ()/(52-93) 124/58     Weight: 69.3 kg (152 lb 12.5 oz)  Body mass index is 21.92 kg/m².      Intake/Output Summary (Last 24 hours) at 06/02/18 0736  Last data filed at 06/02/18 0700   Gross per 24 hour   Intake             3594 ml   Output             2055 ml   Net             1539 ml       Physical Exam   Constitutional: He appears well-developed.   Cardiovascular: Normal rate.    Incision c/d/i   Pulmonary/Chest: Effort normal.   Neurological: He is alert.   Skin: Skin is warm and dry.       Lines/Drains/Airways     Peripherally Inserted Central Catheter Line                 PICC Double Lumen 03/05/18 1539 right basilic 88 days          Central Venous Catheter Line                 Percutaneous Central Line Insertion/Assessment - Quad lumen  05/25/18 0800 left subclavian 7 days          Drain                 NG/OG Tube 05/29/18 1609 nasogastric 16 Fr. Right nostril 3 days         Urethral Catheter 05/30/18 0445 Straight-tip 16 Fr. 3 days          Line                 Pacer Wires 05/25/18 1355 7 days          Peripheral Intravenous Line                 Peripheral IV - Single Lumen 05/29/18 1734 Right Forearm 3 days         Midline Catheter Insertion/Assessment  - Single Lumen  05/30/18 1623 Right basilic vein (medial side of arm) 18g x 8cm 2 days                Significant Labs:    CBC/Anemia Profile:    Recent Labs  Lab 06/01/18  0657   WBC 6.94   HGB 7.7*   HCT 24.3*   PLT 93*   MCV 97   RDW 16.8*        Chemistries:    Recent Labs  Lab 05/31/18  2032  06/01/18  0814  06/01/18  1800 06/01/18  1949 06/02/18  0005 06/02/18  0352   NA  --   < >  --   < > 155*  --  152* 154*   K 4.0  < > 4.0  < > 4.1 4.1 4.2 4.1   CL  --   < >  --   < > 116*  --  115* 116*   CO2  --   < >  --   < > 31*  --  30* 31*   BUN  --   < >  --   < > 109*  --  110* 106*   CREATININE  --   < >  --   < > 1.7*  --  1.5* 1.5*   CALCIUM  --   < >  --   < > 9.8  --  9.6 9.7   ALBUMIN  --   < >  --   < > 2.9*  --  2.7* 2.7*   PROT  --   < >  --   < > 6.3  --  6.0 6.0   BILITOT  --   < >  --   < > 1.5*  --  1.4* 1.4*   ALKPHOS  --   < >  --   < > 118  --  113 109   ALT  --   < >  --   < > 84*  --  80* 76*   AST  --   < >  --   < > 151*  --  127* 102*   MG 3.0*  --  2.7*  --   --  2.6  --   --    < > = values in this interval not displayed.    Blood Culture:   Recent Labs  Lab 06/01/18  1440 06/01/18  1450   LABBLOO No Growth to date No Growth to date     Coagulation:   Recent Labs  Lab 06/02/18  0352   INR 2.3*   APTT 36.1*       Significant Imaging:  I have reviewed all pertinent imaging results/findings within the past 24 hours.

## 2018-06-02 NOTE — PLAN OF CARE
Reviewed insulin regimen and CBG.     Glucose above goal on the current regimen.    Increase scheduled novolog to 5 units q4 during tube feeds.  Hold novolog if TF suspended or discontinued, or if POC glucose <120.  POC q4  Low dose correction    Notify endocrine for changes in nutrition status (diet, TF, TPN)     Will continue to follow along, please call with any questions.

## 2018-06-02 NOTE — NURSING
Plan of care reviewed with patient and patient's family.  Remained on room air throughout the day with 02 sats >95%.  AAOx4, following commands.  OOB to chair x 8 hrs today.  Pt traveled outside to garden this afternoon with MD permission via wheelchair by RN with portable cardiac monitoring.  Speech to bedside today, ok per MD for minimal pureed pleasure feeds.  NG tube remains on place with tube at goal 50 cc/hr.  Some discomfort noted to throat, but minimal residuals and no abdominal discomfort.  Blood cultures sent.  Mag citrate given for continued constipation.  UOP remains adequate.  VSS throughout the day, see flowsheet for full assessment.

## 2018-06-02 NOTE — ASSESSMENT & PLAN NOTE
67 year old male with history of E. faecalis mitral, aortic and tricuspid valve endocarditis and resultant severe MR treated with IV ampicillin and ceftriaxone for 6 weeks (end 4/12) admitted for planned valve surgery.     Patient underwent bioprosthetic mitral valve replacement and CABG on 5/25. Per op note, extreme destruction of the mitral valve was noted along with large vegetations on the anterior and posterior leaflets. No surgical cultures or blood cultures obtained. He has been off antibiotics since surgery without fevers or a leukocytosis. ID consulted as pathology from the mitral leaflets returned with calcified valvular tissue with fibroid necrosis suggestive of endocarditis.      Recommendations  - His pathology is concerning that he may have subacute endocarditis and that the other valves may be also be infected in the presence of a new prosthetic valve. There are no culture data to support or refute this possibility, however.  - Continue Ampicillin and Ceftriaxone.  - Follow up blood cultures to ensure sterile.   - Anticipate 6 weeks of IV antibiotics for endocarditis.  - Discussed plan with patient and his wife. ID will follow through tomorrow.

## 2018-06-02 NOTE — PLAN OF CARE
Problem: Patient Care Overview  Goal: Plan of Care Review  Outcome: Ongoing (interventions implemented as appropriate)  No s/s of distress today.  Pt OOB to chair today without difficulty and tolerating well. Physical therapy presents to bedside and works with pt.  Pt tolerating tube feedings well, and water boluses administered per orders (with pt also tolerating well).  D5W IV at 50ml/hr per orders.  Antibiotics administered per orders.  Accu-checks and labs sent per orders. Plan of care reviewed with pt and pt's family, and verbalization of understanding obtained. Emotional support offered.

## 2018-06-02 NOTE — ASSESSMENT & PLAN NOTE
68yo M with PMH of mitral valve endocarditis and resultant severe MR, TR and pulmonary HTN who is now s/p IABP placement (5/24) and s/pMVR and CABG 5/25.     Plan:     Neuro:   - Intermittent waxing and waning mental status. Recommend delirium precautions.   - AAOx4 this AM.   -Pain control per CTS     Pulmonary:   -Currently on RA.   - Continuous pulse ox.   - Supplement O2 if necessary to maintain sats.   - ABGs/CXR if distressed.        Cardiac:  -MAP goal >65  -Paced at 80  -Amlodipine daily  -Statin daily  -TTE revealing LA thrombus yesterday so cannot perform DCCV at this time  - EPS thinks amelia function may return in time, but recommended starting theophyline.   -Coumadin therapy.  -ASA  -Continuous cardiac monitoring  -Pathology from valve returned with active endocarditis, ID consulted     Renal:   -UOP adequate.   -Bun/Cr improving.   -Lasix gtt off.   -Flomax daily given home use.  - MIVF.      Fluids/Electrolytes/Nutrition/GI:   -Lasix gtt stopped, MIVF started  -Na remains elevated currently on free water boluses started @ 200cc QID  - NPO. Advance diet as able.   -TFs well tolerated  -replace lytes PRN  -Bowel regimen     Hematology/Oncology:  -H/H stable  - continue to monitor  -INR daily  -CBC daily     Infectious Disease:   -Afebrile  -WBC wnl  -CBC daily   -Pathology from valve with active endocarditis, ID consulted, recommend ceftriaxone and ampicillin and PICC placement for long term abx administration  -Abx: Ceftriaxone and Ampicillin     Endocrine:  - No h/o DM or thyroid dysfunction  - Endo following for BG. Continue following recs.   - Glucose goal of 120-180     Dispo:  -CTS primary

## 2018-06-03 LAB
ABO + RH BLD: NORMAL
ALBUMIN SERPL BCP-MCNC: 2.5 G/DL
ALBUMIN SERPL BCP-MCNC: 2.5 G/DL
ALP SERPL-CCNC: 102 U/L
ALP SERPL-CCNC: 106 U/L
ALT SERPL W/O P-5'-P-CCNC: 52 U/L
ALT SERPL W/O P-5'-P-CCNC: 53 U/L
ANION GAP SERPL CALC-SCNC: 7 MMOL/L
ANION GAP SERPL CALC-SCNC: 8 MMOL/L
ANION GAP SERPL CALC-SCNC: 8 MMOL/L
APTT BLDCRRT: 37 SEC
AST SERPL-CCNC: 45 U/L
AST SERPL-CCNC: 51 U/L
BASOPHILS # BLD AUTO: 0.01 K/UL
BASOPHILS NFR BLD: 0.1 %
BILIRUB SERPL-MCNC: 0.9 MG/DL
BILIRUB SERPL-MCNC: 0.9 MG/DL
BLD GP AB SCN CELLS X3 SERPL QL: NORMAL
BUN SERPL-MCNC: 100 MG/DL
BUN SERPL-MCNC: 90 MG/DL
BUN SERPL-MCNC: 97 MG/DL
CALCIUM SERPL-MCNC: 9.5 MG/DL
CALCIUM SERPL-MCNC: 9.6 MG/DL
CALCIUM SERPL-MCNC: 9.6 MG/DL
CHLORIDE SERPL-SCNC: 115 MMOL/L
CHLORIDE SERPL-SCNC: 115 MMOL/L
CHLORIDE SERPL-SCNC: 116 MMOL/L
CO2 SERPL-SCNC: 28 MMOL/L
CO2 SERPL-SCNC: 30 MMOL/L
CO2 SERPL-SCNC: 30 MMOL/L
CREAT SERPL-MCNC: 1.4 MG/DL
CREAT SERPL-MCNC: 1.4 MG/DL
CREAT SERPL-MCNC: 1.5 MG/DL
DIFFERENTIAL METHOD: ABNORMAL
EOSINOPHIL # BLD AUTO: 0.6 K/UL
EOSINOPHIL NFR BLD: 5.8 %
ERYTHROCYTE [DISTWIDTH] IN BLOOD BY AUTOMATED COUNT: 16.7 %
EST. GFR  (AFRICAN AMERICAN): 54.9 ML/MIN/1.73 M^2
EST. GFR  (AFRICAN AMERICAN): 59.7 ML/MIN/1.73 M^2
EST. GFR  (AFRICAN AMERICAN): 59.7 ML/MIN/1.73 M^2
EST. GFR  (NON AFRICAN AMERICAN): 47.5 ML/MIN/1.73 M^2
EST. GFR  (NON AFRICAN AMERICAN): 51.6 ML/MIN/1.73 M^2
EST. GFR  (NON AFRICAN AMERICAN): 51.6 ML/MIN/1.73 M^2
GLUCOSE SERPL-MCNC: 132 MG/DL
GLUCOSE SERPL-MCNC: 155 MG/DL
GLUCOSE SERPL-MCNC: 160 MG/DL
HCT VFR BLD AUTO: 21.6 %
HGB BLD-MCNC: 6.8 G/DL
IMM GRANULOCYTES # BLD AUTO: 0.06 K/UL
IMM GRANULOCYTES NFR BLD AUTO: 0.6 %
INR PPP: 2.8
LYMPHOCYTES # BLD AUTO: 0.7 K/UL
LYMPHOCYTES NFR BLD: 7.6 %
MAGNESIUM SERPL-MCNC: 2.5 MG/DL
MCH RBC QN AUTO: 30.6 PG
MCHC RBC AUTO-ENTMCNC: 31.5 G/DL
MCV RBC AUTO: 97 FL
MONOCYTES # BLD AUTO: 0.5 K/UL
MONOCYTES NFR BLD: 5.3 %
NEUTROPHILS # BLD AUTO: 7.7 K/UL
NEUTROPHILS NFR BLD: 80.6 %
NRBC BLD-RTO: 0 /100 WBC
PLATELET # BLD AUTO: 116 K/UL
PMV BLD AUTO: 12.9 FL
POCT GLUCOSE: 157 MG/DL (ref 70–110)
POCT GLUCOSE: 164 MG/DL (ref 70–110)
POCT GLUCOSE: 166 MG/DL (ref 70–110)
POCT GLUCOSE: 170 MG/DL (ref 70–110)
POCT GLUCOSE: 180 MG/DL (ref 70–110)
POCT GLUCOSE: 191 MG/DL (ref 70–110)
POTASSIUM SERPL-SCNC: 4 MMOL/L
POTASSIUM SERPL-SCNC: 4 MMOL/L
POTASSIUM SERPL-SCNC: 4.2 MMOL/L
POTASSIUM SERPL-SCNC: 4.3 MMOL/L
PROT SERPL-MCNC: 5.7 G/DL
PROT SERPL-MCNC: 5.9 G/DL
PROTHROMBIN TIME: 27.2 SEC
RBC # BLD AUTO: 2.22 M/UL
SODIUM SERPL-SCNC: 151 MMOL/L
SODIUM SERPL-SCNC: 153 MMOL/L
SODIUM SERPL-SCNC: 153 MMOL/L
WBC # BLD AUTO: 9.49 K/UL

## 2018-06-03 PROCEDURE — 85610 PROTHROMBIN TIME: CPT

## 2018-06-03 PROCEDURE — 86920 COMPATIBILITY TEST SPIN: CPT

## 2018-06-03 PROCEDURE — 20000000 HC ICU ROOM

## 2018-06-03 PROCEDURE — 25000003 PHARM REV CODE 250: Performed by: STUDENT IN AN ORGANIZED HEALTH CARE EDUCATION/TRAINING PROGRAM

## 2018-06-03 PROCEDURE — 94799 UNLISTED PULMONARY SVC/PX: CPT

## 2018-06-03 PROCEDURE — 83735 ASSAY OF MAGNESIUM: CPT

## 2018-06-03 PROCEDURE — 25000003 PHARM REV CODE 250: Performed by: NURSE PRACTITIONER

## 2018-06-03 PROCEDURE — 86850 RBC ANTIBODY SCREEN: CPT

## 2018-06-03 PROCEDURE — A4216 STERILE WATER/SALINE, 10 ML: HCPCS | Performed by: THORACIC SURGERY (CARDIOTHORACIC VASCULAR SURGERY)

## 2018-06-03 PROCEDURE — 94761 N-INVAS EAR/PLS OXIMETRY MLT: CPT

## 2018-06-03 PROCEDURE — 80053 COMPREHEN METABOLIC PANEL: CPT

## 2018-06-03 PROCEDURE — 99232 SBSQ HOSP IP/OBS MODERATE 35: CPT | Mod: ,,, | Performed by: ANESTHESIOLOGY

## 2018-06-03 PROCEDURE — 85730 THROMBOPLASTIN TIME PARTIAL: CPT

## 2018-06-03 PROCEDURE — 80053 COMPREHEN METABOLIC PANEL: CPT | Mod: 91

## 2018-06-03 PROCEDURE — 63600175 PHARM REV CODE 636 W HCPCS: Performed by: PHYSICIAN ASSISTANT

## 2018-06-03 PROCEDURE — 85025 COMPLETE CBC W/AUTO DIFF WBC: CPT

## 2018-06-03 PROCEDURE — 25000003 PHARM REV CODE 250: Performed by: THORACIC SURGERY (CARDIOTHORACIC VASCULAR SURGERY)

## 2018-06-03 PROCEDURE — 25000003 PHARM REV CODE 250: Performed by: PHYSICIAN ASSISTANT

## 2018-06-03 PROCEDURE — 80048 BASIC METABOLIC PNL TOTAL CA: CPT

## 2018-06-03 RX ORDER — HYDROCODONE BITARTRATE AND ACETAMINOPHEN 500; 5 MG/1; MG/1
TABLET ORAL
Status: DISCONTINUED | OUTPATIENT
Start: 2018-06-03 | End: 2018-06-04

## 2018-06-03 RX ADMIN — DEXTROSE: 5 SOLUTION INTRAVENOUS at 06:06

## 2018-06-03 RX ADMIN — AMPICILLIN 2 G: 2 INJECTION, POWDER, FOR SOLUTION INTRAVENOUS at 05:06

## 2018-06-03 RX ADMIN — AMPICILLIN 2 G: 2 INJECTION, POWDER, FOR SOLUTION INTRAVENOUS at 09:06

## 2018-06-03 RX ADMIN — DOXAZOSIN MESYLATE 4 MG: 2 TABLET ORAL at 08:06

## 2018-06-03 RX ADMIN — INSULIN ASPART 5 UNITS: 100 INJECTION, SOLUTION INTRAVENOUS; SUBCUTANEOUS at 12:06

## 2018-06-03 RX ADMIN — CEFTRIAXONE SODIUM 2 G: 2 INJECTION, POWDER, FOR SOLUTION INTRAMUSCULAR; INTRAVENOUS at 06:06

## 2018-06-03 RX ADMIN — AMLODIPINE BESYLATE 10 MG: 10 TABLET ORAL at 08:06

## 2018-06-03 RX ADMIN — AMPICILLIN 2 G: 2 INJECTION, POWDER, FOR SOLUTION INTRAVENOUS at 10:06

## 2018-06-03 RX ADMIN — CEFTRIAXONE SODIUM 2 G: 2 INJECTION, POWDER, FOR SOLUTION INTRAMUSCULAR; INTRAVENOUS at 04:06

## 2018-06-03 RX ADMIN — INSULIN ASPART 5 UNITS: 100 INJECTION, SOLUTION INTRAVENOUS; SUBCUTANEOUS at 04:06

## 2018-06-03 RX ADMIN — WARFARIN SODIUM 3 MG: 2 TABLET ORAL at 04:06

## 2018-06-03 RX ADMIN — INSULIN ASPART 5 UNITS: 100 INJECTION, SOLUTION INTRAVENOUS; SUBCUTANEOUS at 09:06

## 2018-06-03 RX ADMIN — ASPIRIN 81 MG 81 MG: 81 TABLET ORAL at 08:06

## 2018-06-03 RX ADMIN — Medication 3 ML: at 06:06

## 2018-06-03 RX ADMIN — FAMOTIDINE 20 MG: 20 TABLET ORAL at 08:06

## 2018-06-03 RX ADMIN — POLYETHYLENE GLYCOL 3350 17 G: 17 POWDER, FOR SOLUTION ORAL at 08:06

## 2018-06-03 RX ADMIN — Medication 3 ML: at 01:06

## 2018-06-03 RX ADMIN — AMPICILLIN 2 G: 2 INJECTION, POWDER, FOR SOLUTION INTRAVENOUS at 04:06

## 2018-06-03 RX ADMIN — MIRTAZAPINE 7.5 MG: 7.5 TABLET ORAL at 09:06

## 2018-06-03 RX ADMIN — ATORVASTATIN CALCIUM 40 MG: 20 TABLET, FILM COATED ORAL at 08:06

## 2018-06-03 RX ADMIN — INSULIN ASPART 5 UNITS: 100 INJECTION, SOLUTION INTRAVENOUS; SUBCUTANEOUS at 08:06

## 2018-06-03 NOTE — SUBJECTIVE & OBJECTIVE
Interval History/Significant Events:     Patient remains in rate controlled atrial fibrillation. He is tolerating tube feeds well. Urine output remains adequate. Possibly stepping down per CTS.     Follow-up For: Procedure(s) (LRB):  Mitral Valve Replacement (N/A)  AORTOCORONARY BYPASS-CABG (N/A)    Post-Operative Day: 9 Days Post-Op    Objective:     Vital Signs (Most Recent):  Temp: 98.4 °F (36.9 °C) (06/03/18 0300)  Pulse: 79 (06/03/18 0700)  Resp: 13 (06/03/18 0700)  BP: 133/63 (06/03/18 0700)  SpO2: 96 % (06/03/18 0700) Vital Signs (24h Range):  Temp:  [97.6 °F (36.4 °C)-98.5 °F (36.9 °C)] 98.4 °F (36.9 °C)  Pulse:  [77-87] 79  Resp:  [13-40] 13  SpO2:  [93 %-98 %] 96 %  BP: (105-145)/(54-70) 133/63     Weight: 75.3 kg (166 lb 0.1 oz)  Body mass index is 23.82 kg/m².      Intake/Output Summary (Last 24 hours) at 06/03/18 0719  Last data filed at 06/03/18 0700   Gross per 24 hour   Intake          4208.57 ml   Output             2015 ml   Net          2193.57 ml       Physical Exam   Constitutional: He is oriented to person, place, and time. He appears well-developed and well-nourished. No distress.   HENT:   Head: Normocephalic and atraumatic.   NG tube in place   Eyes: Conjunctivae are normal. No scleral icterus.   Cardiovascular: Normal rate.    Murmur heard.  Sternal incision c/d/i. No wound dehiscence. No purulent drainage, surrounding erythema, warmth or induration   Pulmonary/Chest: Effort normal. No respiratory distress.   Abdominal: Soft. He exhibits no distension. There is no tenderness.   Genitourinary:   Genitourinary Comments: Lainez catheter in place with clear yellow urine in bag   Neurological: He is alert and oriented to person, place, and time.   Skin: Skin is warm and dry. No rash noted. He is not diaphoretic.   Psychiatric: He has a normal mood and affect. His behavior is normal.       Vents:  Vent Mode: Spont (05/26/18 2025)  Ventilator Initiated: Yes (05/25/18 1509)  Set Rate: 0 bmp  (05/26/18 2025)  Vt Set: 500 mL (05/26/18 2025)  Pressure Support: 10 cmH20 (05/26/18 2025)  PEEP/CPAP: 5 cmH20 (05/26/18 2025)  Oxygen Concentration (%): 24 (05/29/18 0402)  Peak Airway Pressure: 16 cmH2O (05/26/18 2025)  Plateau Pressure: 0 cmH20 (05/26/18 2025)  Total Ve: 11.8 mL (05/26/18 2025)  F/VT Ratio<105 (RSBI): (!) 32.76 (05/26/18 2025)    Lines/Drains/Airways     Peripherally Inserted Central Catheter Line                 PICC Double Lumen 03/05/18 1539 right basilic 89 days          Central Venous Catheter Line                 Percutaneous Central Line Insertion/Assessment - Quad lumen  05/25/18 0800 left subclavian 8 days          Drain                 NG/OG Tube 05/29/18 1609 nasogastric 16 Fr. Right nostril 4 days         Urethral Catheter 05/30/18 0445 Straight-tip 16 Fr. 4 days          Line                 Pacer Wires 05/25/18 1355 8 days          Peripheral Intravenous Line                 Peripheral IV - Single Lumen 05/29/18 1734 Right Forearm 4 days         Midline Catheter Insertion/Assessment  - Single Lumen 05/30/18 1623 Right basilic vein (medial side of arm) 18g x 8cm 3 days                Significant Labs:    CBC/Anemia Profile:    Recent Labs  Lab 06/03/18  0429   WBC 9.49   HGB 6.8*   HCT 21.6*   *   MCV 97   RDW 16.7*        Chemistries:    Recent Labs  Lab 06/02/18  0815  06/02/18  1650 06/02/18  2120 06/03/18  0011 06/03/18  0429   NA  --   < > 154*  --  153* 153*   K 4.3  < > 4.3 4.0 4.3 4.0  4.0   CL  --   < > 116*  --  115* 116*   CO2  --   < > 30*  --  30* 30*   BUN  --   < > 108*  --  100* 97*   CREATININE  --   < > 1.6*  --  1.5* 1.4   CALCIUM  --   < > 9.9  --  9.6 9.6   ALBUMIN  --   < > 2.7*  --  2.5* 2.5*   PROT  --   < > 6.1  --  5.9* 5.7*   BILITOT  --   < > 1.0  --  0.9 0.9   ALKPHOS  --   < > 107  --  102 106   ALT  --   < > 64*  --  53* 52*   AST  --   < > 61*  --  51* 45*   MG 2.8*  --   --  2.3  --  2.5   < > = values in this interval not displayed.

## 2018-06-03 NOTE — SUBJECTIVE & OBJECTIVE
Interval History: NAEON. Remains afebrile. Blood cx negative. Without new complaints today.  Discussed antibiotic plan with patient and his wife.    Review of Systems   Constitutional: Negative for chills, diaphoresis, fatigue and fever.   HENT: Positive for nosebleeds. Negative for congestion.    Respiratory: Positive for shortness of breath. Negative for cough.    Cardiovascular: Negative for chest pain and palpitations.   Gastrointestinal: Negative for abdominal pain, constipation, diarrhea and nausea.   Genitourinary: Positive for difficulty urinating. Negative for dysuria, flank pain and hematuria.   Neurological: Positive for weakness. Negative for headaches.   Psychiatric/Behavioral: Negative for confusion. The patient is not nervous/anxious.      Objective:     Vital Signs (Most Recent):  Temp: 98 °F (36.7 °C) (06/03/18 1100)  Pulse: 90 (06/03/18 1300)  Resp: 19 (06/03/18 1300)  BP: 135/66 (06/03/18 1300)  SpO2: 98 % (06/03/18 1300) Vital Signs (24h Range):  Temp:  [97.6 °F (36.4 °C)-98.4 °F (36.9 °C)] 98 °F (36.7 °C)  Pulse:  [77-90] 90  Resp:  [13-40] 19  SpO2:  [93 %-98 %] 98 %  BP: (120-151)/(54-70) 135/66     Weight: 75.3 kg (166 lb 0.1 oz)  Body mass index is 23.82 kg/m².    Estimated Creatinine Clearance: 52.9 mL/min (based on SCr of 1.4 mg/dL).    Physical Exam   Constitutional: He is oriented to person, place, and time. He appears well-developed and well-nourished. No distress.   HENT:   Head: Normocephalic and atraumatic.   NG tube in place   Eyes: Conjunctivae are normal. No scleral icterus.   Cardiovascular: Normal rate.    Murmur heard.  Sternal incision c/d/i. No wound dehiscence. No purulent drainage, surrounding erythema, warmth or induration   Pulmonary/Chest: Effort normal. No respiratory distress.   Abdominal: Soft. He exhibits no distension. There is no tenderness.   Genitourinary:   Genitourinary Comments: Lainez catheter in place with clear yellow urine in bag   Neurological: He is alert  and oriented to person, place, and time.   Skin: Skin is warm and dry. No rash noted. He is not diaphoretic.   Psychiatric: He has a normal mood and affect. His behavior is normal.       Significant Labs: All pertinent labs within the past 24 hours have been reviewed.    Significant Imaging: I have reviewed all pertinent imaging results/findings within the past 24 hours.

## 2018-06-03 NOTE — ASSESSMENT & PLAN NOTE
66yo M with PMH of mitral valve endocarditis and resultant severe MR, TR and pulmonary HTN who is now s/p IABP placement (5/24) and s/pMVR and CABG 5/25.     Plan:     Neuro:   - Mental Status improved. Recommend continued delirium precautions.   - AAOx4 this AM.   -Pain control per CTS     Pulmonary:   - Currently on RA.   - Continuous pulse ox.   - Supplement O2 if necessary to maintain sats.   - ABGs/CXR if distressed.        Cardiac:  - MAP goal >65  - Paced at 80  - Amlodipine daily  - Statin daily  -TTE revealed LA thrombus. Therefore no DCCV at this time.  - EPS thinks amelia function may return in time. Started theophyline.   - Coumadin therapy.  - ASA  - Continuous cardiac monitoring  - Pathology from valve returned with active endocarditis. S/p ID consultation. PICC placemed for long term abx administration. Now ceftriaxone and ampicillin.     Renal:   -UOP adequate.   -Bun/Cr improving.   -Lasix gtt off.   -Flomax daily given home use.  - MIVF.      Fluids/Electrolytes/Nutrition/GI:   -MIVF   -Na remains elevated currently on free water boluses.  - NPO. Advance diet as able.   -TFs well tolerated  -replace lytes PRN  -Bowel regimen     Hematology/Oncology:  -H/H stable  - continue to monitor  -INR daily  -CBC daily     Infectious Disease:   -Afebrile  -WBC wnl  -CBC daily   -Pathology from valve with active endocarditis, ID consulted, recommend ceftriaxone and ampicillin and PICC placement for long term abx administration  -Abx: Ceftriaxone and Ampicillin     Endocrine:  - No h/o DM or thyroid dysfunction  - Endo following for BG. Continue following recs.   - Glucose goal of 120-180     Dispo:  -CTS primary

## 2018-06-03 NOTE — PROGRESS NOTES
[facility]  Critical Care - Surgery  Progress Note    Patient Name: Rodolfo Messina  MRN: [id number]  Admission Date: 5/23/2018  Hospital Length of Stay: 11 days  Code Status: Full Code  Attending Provider: Marvin Go MD  Primary Care Provider: Deric Claudio MD   Principal Problem: S/P MVR (mitral valve replacement)    Subjective:     Hospital/ICU Course:  No notes on file    Interval History/Significant Events:     Patient remains in rate controlled atrial fibrillation. He is tolerating tube feeds well. Urine output remains adequate. Possibly stepping down per CTS.     Follow-up For: Procedure(s) (LRB):  Mitral Valve Replacement (N/A)  AORTOCORONARY BYPASS-CABG (N/A)    Post-Operative Day: 9 Days Post-Op    Objective:     Vital Signs (Most Recent):  Temp: 98.4 °F (36.9 °C) (06/03/18 0300)  Pulse: 79 (06/03/18 0700)  Resp: 13 (06/03/18 0700)  BP: 133/63 (06/03/18 0700)  SpO2: 96 % (06/03/18 0700) Vital Signs (24h Range):  Temp:  [97.6 °F (36.4 °C)-98.5 °F (36.9 °C)] 98.4 °F (36.9 °C)  Pulse:  [77-87] 79  Resp:  [13-40] 13  SpO2:  [93 %-98 %] 96 %  BP: (105-145)/(54-70) 133/63     Weight: 75.3 kg (166 lb 0.1 oz)  Body mass index is 23.82 kg/m².      Intake/Output Summary (Last 24 hours) at 06/03/18 0719  Last data filed at 06/03/18 0700   Gross per 24 hour   Intake          4208.57 ml   Output             2015 ml   Net          2193.57 ml       Physical Exam   Constitutional: He is oriented to person, place, and time. He appears well-developed and well-nourished. No distress.   HENT:   Head: Normocephalic and atraumatic.   NG tube in place   Eyes: Conjunctivae are normal. No scleral icterus.   Cardiovascular: Normal rate.    Murmur heard.  Sternal incision c/d/i. No wound dehiscence. No purulent drainage, surrounding erythema, warmth or induration   Pulmonary/Chest: Effort normal. No respiratory distress.   Abdominal: Soft. He exhibits no distension. There is no tenderness.    Genitourinary:   Genitourinary Comments: Lainez catheter in place with clear yellow urine in bag   Neurological: He is alert and oriented to person, place, and time.   Skin: Skin is warm and dry. No rash noted. He is not diaphoretic.   Psychiatric: He has a normal mood and affect. His behavior is normal.       Vents:  Vent Mode: Spont (05/26/18 2025)  Ventilator Initiated: Yes (05/25/18 1509)  Set Rate: 0 bmp (05/26/18 2025)  Vt Set: 500 mL (05/26/18 2025)  Pressure Support: 10 cmH20 (05/26/18 2025)  PEEP/CPAP: 5 cmH20 (05/26/18 2025)  Oxygen Concentration (%): 24 (05/29/18 0402)  Peak Airway Pressure: 16 cmH2O (05/26/18 2025)  Plateau Pressure: 0 cmH20 (05/26/18 2025)  Total Ve: 11.8 mL (05/26/18 2025)  F/VT Ratio<105 (RSBI): (!) 32.76 (05/26/18 2025)    Lines/Drains/Airways     Peripherally Inserted Central Catheter Line                 PICC Double Lumen 03/05/18 1539 right basilic 89 days          Central Venous Catheter Line                 Percutaneous Central Line Insertion/Assessment - Quad lumen  05/25/18 0800 left subclavian 8 days          Drain                 NG/OG Tube 05/29/18 1609 nasogastric 16 Fr. Right nostril 4 days         Urethral Catheter 05/30/18 0445 Straight-tip 16 Fr. 4 days          Line                 Pacer Wires 05/25/18 1355 8 days          Peripheral Intravenous Line                 Peripheral IV - Single Lumen 05/29/18 1734 Right Forearm 4 days         Midline Catheter Insertion/Assessment  - Single Lumen 05/30/18 1623 Right basilic vein (medial side of arm) 18g x 8cm 3 days                Significant Labs:    CBC/Anemia Profile:    Recent Labs  Lab 06/03/18  0429   WBC 9.49   HGB 6.8*   HCT 21.6*   *   MCV 97   RDW 16.7*        Chemistries:    Recent Labs  Lab 06/02/18  0815  06/02/18  1650 06/02/18  2120 06/03/18  0011 06/03/18  0429   NA  --   < > 154*  --  153* 153*   K 4.3  < > 4.3 4.0 4.3 4.0  4.0   CL  --   < > 116*  --  115* 116*   CO2  --   < > 30*  --  30* 30*    BUN  --   < > 108*  --  100* 97*   CREATININE  --   < > 1.6*  --  1.5* 1.4   CALCIUM  --   < > 9.9  --  9.6 9.6   ALBUMIN  --   < > 2.7*  --  2.5* 2.5*   PROT  --   < > 6.1  --  5.9* 5.7*   BILITOT  --   < > 1.0  --  0.9 0.9   ALKPHOS  --   < > 107  --  102 106   ALT  --   < > 64*  --  53* 52*   AST  --   < > 61*  --  51* 45*   MG 2.8*  --   --  2.3  --  2.5   < > = values in this interval not displayed.      Assessment/Plan:     Chronic systolic congestive heart failure     68yo M with PMH of mitral valve endocarditis and resultant severe MR, TR and pulmonary HTN who is now s/p IABP placement (5/24) and s/pMVR and CABG 5/25.     Plan:     Neuro:   - Mental Status improved. Recommend continued delirium precautions.   - AAOx4 this AM.   -Pain control per CTS     Pulmonary:   - Currently on RA.   - Continuous pulse ox.   - Supplement O2 if necessary to maintain sats.   - ABGs/CXR if distressed.        Cardiac:  - MAP goal >65  - Paced at 80  - Amlodipine daily  - Statin daily  -TTE revealed LA thrombus. Therefore no DCCV at this time.  - EPS thinks amelia function may return in time. Started theophyline.   - Coumadin therapy.  - ASA  - Continuous cardiac monitoring  - Pathology from valve returned with active endocarditis. S/p ID consultation. PICC placemed for long term abx administration. Now ceftriaxone and ampicillin.     Renal:   -UOP adequate.   -Bun/Cr improving.   -Lasix gtt off.   -Flomax daily given home use.  - MIVF.      Fluids/Electrolytes/Nutrition/GI:   -MIVF   -Na remains elevated currently on free water boluses.  - NPO. Advance diet as able.   -TFs well tolerated  -replace lytes PRN  -Bowel regimen  - S/p speech consultation. Awaiting barium swallow planned for Monday.      Hematology/Oncology:  -H/H stable  - continue to monitor  -INR daily  -CBC daily     Infectious Disease:   -Afebrile  -WBC wnl  -CBC daily   -Pathology from valve with active endocarditis, ID consulted, recommend ceftriaxone and  ampicillin and PICC placement for long term abx administration  -Abx: Ceftriaxone and Ampicillin     Endocrine:  - No h/o DM or thyroid dysfunction  - Endo following for BG. Continue following recs.   - Glucose goal of 120-180     Dispo:  -CTS primary                    Critical care was time spent personally by me on the following activities: development of treatment plan with patient or surrogate and bedside caregivers, discussions with consultants, evaluation of patient's response to treatment, examination of patient, ordering and performing treatments and interventions, ordering and review of laboratory studies, ordering and review of radiographic studies, pulse oximetry, re-evaluation of patient's condition.  This critical care time did not overlap with that of any other provider or involve time for any procedures.     Patrick Hawkins MD  Critical Care - Surgery  [facility]

## 2018-06-03 NOTE — PLAN OF CARE
Reviewed insulin regimen and CBG.     Glucose at goal on current regimen. No changes today.      Will continue to follow along, please call with any questions.

## 2018-06-03 NOTE — PROGRESS NOTES
Surgery Progress Note    Overnight Events:   Patient in rate-controlled atrial fibrillation   Tolerating tube feeds   Good UOP     Physical Exam:  Temp:  [97.6 °F (36.4 °C)-98.7 °F (37.1 °C)] 98.3 °F (36.8 °C)  Pulse:  [77-94] 80  Resp:  [14-28] 21  SpO2:  [94 %-98 %] 98 %  BP: (105-155)/(54-65) 139/61    Gen: NAD, confused   CV: RRR, mediastinal incisions c/d/i  Pulm: unlabored breathing  Skin: dry/intact    I/O last 3 completed shifts:  In: 5606 [I.V.:2316; NG/GT:3290]  Out: 2845 [Urine:2845]        Labs:  Recent Labs      05/31/18   0400   06/01/18   0657   06/01/18   1949   06/02/18   0815  06/02/18   1200  06/02/18   1650   WBC  6.86   --   6.94   --    --    --    --    --    --    HGB  7.5*   --   7.7*   --    --    --    --    --    --    HCT  23.6*   --   24.3*   --    --    --    --    --    --    PLT  82*   --   93*   --    --    --    --    --    --    NA  157*   < >   --    < >   --    < >   --   151*  154*   K  4.2   < >   --    < >  4.1   < >  4.3  4.2  4.3   PHOS  2.4*   --    --    --    --    --    --    --    --    MG  3.1*   < >   --    < >  2.6   --   2.8*   --    --    CALCIUM  9.9   < >   --    < >   --    < >   --   9.8  9.9   BUN  112*   < >   --    < >   --    < >   --   112*  108*   CREATININE  2.2*   < >   --    < >   --    < >   --   1.6*  1.6*    < > = values in this interval not displayed.       A/P:  66yo M with PMH of mitral valve endocarditis and resultant severe MR, TR and pulmonary HTN who is now s/p IABP placement (5/24) and s/pMVR and CABG 5/25.    Neuro:   - Intermittent waxing and waning mental status.   - oxycodone 5mg prn pain      Pulmonary:   -Currently on RA.   - Continuous pulse ox.   - Supplement O2 if necessary to maintain sats.         Cardiac:  -MAP goal >65  -Paced at 80  -Amlodipine daily  -Statin daily  -TTE revealing LA thrombus  - EPS thinks amelia function may return in time, but recommended starting theophyline.   -Coumadin therapy.  -ASA  -Continuous cardiac  monitoring  -Pathology from valve returned with active endocarditis, ID consulted     Renal:   -UOP adequate.   -Bun/Cr improving.   -Lasix gtt off.   -Flomax daily given home use.  - MIVF.      Fluids/Electrolytes/Nutrition/GI:   -Lasix gtt stopped, MIVF started  -Na remains elevated currently on free water boluses started @ 200cc QID; D5 water @30cc added  - Will start puree diet; Appreciate speech assistance    -TFs well tolerated  -replace lytes PRN  -Bowel regimen     Hematology/Oncology:  -H/H stable  - continue to monitor  -INR daily  -CBC daily     Infectious Disease:   -Afebrile  -WBC wnl  -CBC daily   -Pathology from valve with active endocarditis, ID consulted, recommend ceftriaxone and ampicillin and PICC placement for long term abx administration  -Abx: Ceftriaxone and Ampicillin     Endocrine:  - No h/o DM or thyroid dysfunction  - Endo following for BG. Continue following recs.   - Glucose goal of 120-180     Dispo:  Remain in ICU     PARTHA Casas MD (PGY2)

## 2018-06-03 NOTE — ASSESSMENT & PLAN NOTE
67 year old male with history of E. faecalis mitral, aortic and tricuspid valve endocarditis and resultant severe MR treated with IV ampicillin and ceftriaxone for 6 weeks (end 4/12) admitted for planned valve surgery.     Patient underwent bioprosthetic mitral valve replacement and CABG on 5/25. Per op note, extreme destruction of the mitral valve was noted along with large vegetations on the anterior and posterior leaflets. No surgical cultures or blood cultures obtained. He has been off antibiotics since surgery without fevers or a leukocytosis. ID consulted as pathology from the mitral leaflets returned with calcified valvular tissue with fibroid necrosis suggestive of endocarditis.      Recommendations  - His pathology is concerning that he may have subacute endocarditis and that the other valves may also be infected in the presence of a new prosthetic valve. There are no culture data to support or refute this possibility. Will treat again for SBE.  - Recommend Ampicillin and Ceftriaxone x 6 weeks for Enterococcal SBE (end date 7/13/18)  - Patient will need ESR, CRP, CBC and CMP to be drawn weekly with results faxed to the ID Department at [phone]  - Continue care per primary team  - Please arrange ID follow up within a few weeks of discharge  - ID will sign off. Feel free to re-consult ID for any infectious concerns.

## 2018-06-03 NOTE — PLAN OF CARE
"Problem: Patient Care Overview  Goal: Individualization & Mutuality  Dx: CHF  Hx: mitral valve endocarditis and resultant severe MR, TR and pulmonary HTN, EF 36%, diverticulosus, BPH    5/23: admit pre-op  5/24: IABP  5/25: OR for MV replacement, CABGx1  5/26: IABP d/c, 500mL albumin, lasix gtt started. Extubated.  5/27: Cardene restarted, central line d/c      Nursing:  MAP 60-80         Outcome: Ongoing (interventions implemented as appropriate)  Intermittent confusion per report however lucid for pm shift. Vss, afebrile. Ng to right nare with TF @ 50 with 300 water bolus QID. Lainez with adequate urine output.  PIV to right upper arm and midline to right upper arm. Denies pain however "uncomfortable " with ng tube. Uses yanker independently. Midline sternal incision clean and open to air. Spouse at bedside. D5      "

## 2018-06-03 NOTE — PLAN OF CARE
Problem: Patient Care Overview  Goal: Plan of Care Review  Outcome: Ongoing (interventions implemented as appropriate)  Patient out of bed to chair this afternoon and briefly ambulated in hallway. Tolerating TF at goal of 50 cc/hr. 300 cc H2O boluses Q6. No complaints of pain. D5 @ 50 cc/hr. Swallow study tomorrow. No acute events. VSS at this time. Will continue to monitor patient.

## 2018-06-03 NOTE — PROGRESS NOTES
[facility]  Infectious Disease  Progress Note    Patient Name: Rodolfo Messina  MRN: [id number]  Admission Date: 5/23/2018  Length of Stay: 11 days  Attending Physician: Marvin Go MD  Primary Care Provider: Deric Claudio MD    Isolation Status: No active isolations  Assessment/Plan:      Endocarditis of mitral valve    67 year old male with history of E. faecalis mitral, aortic and tricuspid valve endocarditis and resultant severe MR treated with IV ampicillin and ceftriaxone for 6 weeks (end 4/12) admitted for planned valve surgery.     Patient underwent bioprosthetic mitral valve replacement and CABG on 5/25. Per op note, extreme destruction of the mitral valve was noted along with large vegetations on the anterior and posterior leaflets. No surgical cultures or blood cultures obtained. He has been off antibiotics since surgery without fevers or a leukocytosis. ID consulted as pathology from the mitral leaflets returned with calcified valvular tissue with fibroid necrosis suggestive of endocarditis.      Recommendations  - His pathology is concerning that he may have subacute endocarditis and that the other valves may also be infected in the presence of a new prosthetic valve. There are no culture data to support or refute this possibility. Will treat again for SBE.  - Recommend Ampicillin and Ceftriaxone x 6 weeks for Enterococcal SBE (end date 7/13/18)  - Patient will need ESR, CRP, CBC and CMP to be drawn weekly with results faxed to the ID Department at [phone]  - Continue care per primary team  - Please arrange ID follow up within a few weeks of discharge  - ID will sign off. Feel free to re-consult ID for any infectious concerns.               Please call for any questions. Thank you.  Divina Landeros PA-C  Phone: [phone]  Pager: [phone]    Subjective:     Principal Problem:S/P MVR (mitral valve replacement)    HPI: Mr. Messina is a 67 year old male with history of E.  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 Since stopping antibiotics he has felt well and has been without fevers, chills, night sweats.    Patient underwent bioprosthetic mitral valve replacement and CABG on 5/25. Per op note, extreme destruction of the mitral valve was noted. Both anterior and posterior leaflets had large vegetations requiring almost near complete excision of the anterior and the posterior leaflets. No intervention was done to the aortic valve as insufficiency was mild. No surgical cultures or blood cultures obtained. He has been off antibiotics since surgery without fevers or a leukocytosis. ID consulted as pathology from the mitral leaflets returned with calcified valvular tissue with fibroid necrosis suggestive of endocarditis.       Interval History: NAEON. Remains afebrile. Blood cx negative. Without new complaints today.  Discussed antibiotic plan with patient and his wife.    Review of Systems   Constitutional: Negative for chills, diaphoresis, fatigue and fever.   HENT: Positive for nosebleeds. Negative for congestion.    Respiratory: Positive for shortness of breath. Negative for cough.    Cardiovascular: Negative for chest pain and palpitations.   Gastrointestinal: Negative for abdominal pain, constipation, diarrhea and nausea.   Genitourinary: Positive for difficulty urinating. Negative for dysuria, flank pain and hematuria.   Neurological: Positive for weakness. Negative for headaches.   Psychiatric/Behavioral: Negative for confusion. The patient is not nervous/anxious.      Objective:     Vital Signs (Most Recent):  Temp: 98 °F (36.7 °C) (06/03/18 1100)  Pulse: 90 (06/03/18 1300)  Resp: 19 (06/03/18 1300)  BP: 135/66 (06/03/18 1300)  SpO2: 98 % (06/03/18 1300) Vital Signs (24h Range):  Temp:   [97.6 °F (36.4 °C)-98.4 °F (36.9 °C)] 98 °F (36.7 °C)  Pulse:  [77-90] 90  Resp:  [13-40] 19  SpO2:  [93 %-98 %] 98 %  BP: (120-151)/(54-70) 135/66     Weight: 75.3 kg (166 lb 0.1 oz)  Body mass index is 23.82 kg/m².    Estimated Creatinine Clearance: 52.9 mL/min (based on SCr of 1.4 mg/dL).    Physical Exam   Constitutional: He is oriented to person, place, and time. He appears well-developed and well-nourished. No distress.   HENT:   Head: Normocephalic and atraumatic.   NG tube in place   Eyes: Conjunctivae are normal. No scleral icterus.   Cardiovascular: Normal rate.    Murmur heard.  Sternal incision c/d/i. No wound dehiscence. No purulent drainage, surrounding erythema, warmth or induration   Pulmonary/Chest: Effort normal. No respiratory distress.   Abdominal: Soft. He exhibits no distension. There is no tenderness.   Genitourinary:   Genitourinary Comments: Lainez catheter in place with clear yellow urine in bag   Neurological: He is alert and oriented to person, place, and time.   Skin: Skin is warm and dry. No rash noted. He is not diaphoretic.   Psychiatric: He has a normal mood and affect. His behavior is normal.       Significant Labs: All pertinent labs within the past 24 hours have been reviewed.    Significant Imaging: I have reviewed all pertinent imaging results/findings within the past 24 hours.

## 2018-06-03 NOTE — PROGRESS NOTES
Cardiothoracic Surgery Progress Note    Overnight Events:  Patient in rate controlled A. Fib  H/H low this am, given 1 unit PRBCs  Sodium high at 153    Physical Exam:  Temp:  [97.9 °F (36.6 °C)-98.4 °F (36.9 °C)] 98 °F (36.7 °C)  Pulse:  [77-90] 79  Resp:  [13-40] 19  SpO2:  [93 %-98 %] 97 %  BP: (120-151)/(47-70) 129/60    Gen: NAD, confused   CV: RRR, mediastinal incisions c/d/i  Pulm: unlabored breathing  Skin: dry/intact    I/O last 3 completed shifts:  In: 5658.6 [I.V.:2638.6; NG/GT:3020]  Out: 3135 [Urine:3135]      Labs:  Recent Labs      06/01/18   0657   06/02/18   2120  06/03/18   0011  06/03/18   0429   WBC  6.94   --    --    --   9.49   HGB  7.7*   --    --    --   6.8*   HCT  24.3*   --    --    --   21.6*   PLT  93*   --    --    --   116*   NA   --    < >   --   153*  153*   K   --    < >  4.0  4.3  4.0  4.0   MG   --    < >  2.3   --   2.5   CALCIUM   --    < >   --   9.6  9.6   BUN   --    < >   --   100*  97*   CREATININE   --    < >   --   1.5*  1.4    < > = values in this interval not displayed.       A/P:  66yo M with PMH of mitral valve endocarditis and resultant severe MR, TR and pulmonary HTN who is now s/p IABP placement (5/24) and s/pMVR and CABG 5/25.     Neuro:   - Intermittent waxing and waning mental status.   - oxycodone 5mg prn pain      Pulmonary:   -Currently on RA.   - Continuous pulse ox.   - Supplement O2 if necessary to maintain sats.         Cardiac:  -MAP goal >65  -Paced at 80  -Amlodipine daily  -Statin daily  -TTE revealing LA thrombus  - EPS thinks amelia function may return in time, but recommended starting theophyline.   -Coumadin therapy.  -ASA  -Continuous cardiac monitoring  -Pathology from valve returned with active endocarditis, ID consulted     Renal:   -UOP adequate.   -Bun/Cr improving.   -Lasix gtt off.   -Flomax daily given home use.  - Lainez should be removed soon   - MIVF     Fluids/Electrolytes/Nutrition/GI:   -Lasix gtt stopped, MIVF started  -Na remains  elevated currently on free water boluses started @ 200cc QID; D5 water @30cc; Today, increased to H2flnmq @50cc/hr  - Will start puree diet; Appreciate speech assistance    -TFs well tolerated  -replace lytes PRN  -Bowel regimen     Hematology/Oncology:  - H/H low this am, given 1 unit PRBCs, responded  -INR daily  -CBC daily     Infectious Disease:   -Afebrile  -WBC wnl  -CBC daily   -Pathology from valve with active endocarditis, ID consulted, recommend ceftriaxone and ampicillin and PICC placement for long term abx administration  -Abx: Ceftriaxone and Ampicillin     Endocrine:  - No h/o DM or thyroid dysfunction  - Endo following for BG. Continue following recs.   - Glucose goal of 120-180     Dispo:  Remain in ICU      PARTHA Casas MD (PGY2)

## 2018-06-04 PROBLEM — Z78.9 ON ENTERAL NUTRITION: Status: ACTIVE | Noted: 2018-06-04

## 2018-06-04 LAB
ALBUMIN SERPL BCP-MCNC: 2.5 G/DL
ALP SERPL-CCNC: 106 U/L
ALT SERPL W/O P-5'-P-CCNC: 45 U/L
ANION GAP SERPL CALC-SCNC: 10 MMOL/L
APTT BLDCRRT: 39.1 SEC
AST SERPL-CCNC: 40 U/L
BASOPHILS # BLD AUTO: 0.01 K/UL
BASOPHILS NFR BLD: 0.1 %
BILIRUB SERPL-MCNC: 0.6 MG/DL
BUN SERPL-MCNC: 84 MG/DL
CALCIUM SERPL-MCNC: 9.7 MG/DL
CHLORIDE SERPL-SCNC: 115 MMOL/L
CO2 SERPL-SCNC: 28 MMOL/L
CREAT SERPL-MCNC: 1.6 MG/DL
DIFFERENTIAL METHOD: ABNORMAL
EOSINOPHIL # BLD AUTO: 0.5 K/UL
EOSINOPHIL NFR BLD: 5.6 %
ERYTHROCYTE [DISTWIDTH] IN BLOOD BY AUTOMATED COUNT: 16.6 %
EST. GFR  (AFRICAN AMERICAN): 50.8 ML/MIN/1.73 M^2
EST. GFR  (NON AFRICAN AMERICAN): 43.9 ML/MIN/1.73 M^2
GLUCOSE SERPL-MCNC: 161 MG/DL
HCT VFR BLD AUTO: 21.5 %
HGB BLD-MCNC: 6.8 G/DL
IMM GRANULOCYTES # BLD AUTO: 0.06 K/UL
IMM GRANULOCYTES NFR BLD AUTO: 0.7 %
INR PPP: 2.9
LYMPHOCYTES # BLD AUTO: 0.8 K/UL
LYMPHOCYTES NFR BLD: 8.4 %
MAGNESIUM SERPL-MCNC: 2.3 MG/DL
MAGNESIUM SERPL-MCNC: 2.5 MG/DL
MCH RBC QN AUTO: 31.1 PG
MCHC RBC AUTO-ENTMCNC: 31.6 G/DL
MCV RBC AUTO: 98 FL
MONOCYTES # BLD AUTO: 0.5 K/UL
MONOCYTES NFR BLD: 5.7 %
NEUTROPHILS # BLD AUTO: 7.1 K/UL
NEUTROPHILS NFR BLD: 79.5 %
NRBC BLD-RTO: 0 /100 WBC
PLATELET # BLD AUTO: 127 K/UL
PMV BLD AUTO: 12.7 FL
POCT GLUCOSE: 135 MG/DL (ref 70–110)
POCT GLUCOSE: 137 MG/DL (ref 70–110)
POCT GLUCOSE: 143 MG/DL (ref 70–110)
POCT GLUCOSE: 144 MG/DL (ref 70–110)
POCT GLUCOSE: 145 MG/DL (ref 70–110)
POCT GLUCOSE: 182 MG/DL (ref 70–110)
POTASSIUM SERPL-SCNC: 4.1 MMOL/L
PROT SERPL-MCNC: 5.8 G/DL
PROTHROMBIN TIME: 28.2 SEC
RBC # BLD AUTO: 2.19 M/UL
SODIUM SERPL-SCNC: 153 MMOL/L
WBC # BLD AUTO: 8.95 K/UL

## 2018-06-04 PROCEDURE — 85025 COMPLETE CBC W/AUTO DIFF WBC: CPT

## 2018-06-04 PROCEDURE — 25000003 PHARM REV CODE 250: Performed by: PHYSICIAN ASSISTANT

## 2018-06-04 PROCEDURE — 63600175 PHARM REV CODE 636 W HCPCS: Performed by: STUDENT IN AN ORGANIZED HEALTH CARE EDUCATION/TRAINING PROGRAM

## 2018-06-04 PROCEDURE — A4216 STERILE WATER/SALINE, 10 ML: HCPCS | Performed by: THORACIC SURGERY (CARDIOTHORACIC VASCULAR SURGERY)

## 2018-06-04 PROCEDURE — 92611 MOTION FLUOROSCOPY/SWALLOW: CPT

## 2018-06-04 PROCEDURE — 02HV33Z INSERTION OF INFUSION DEVICE INTO SUPERIOR VENA CAVA, PERCUTANEOUS APPROACH: ICD-10-PCS | Performed by: THORACIC SURGERY (CARDIOTHORACIC VASCULAR SURGERY)

## 2018-06-04 PROCEDURE — 76937 US GUIDE VASCULAR ACCESS: CPT

## 2018-06-04 PROCEDURE — 93005 ELECTROCARDIOGRAM TRACING: CPT

## 2018-06-04 PROCEDURE — 83735 ASSAY OF MAGNESIUM: CPT

## 2018-06-04 PROCEDURE — B4185 PARENTERAL SOL 10 GM LIPIDS: HCPCS | Performed by: STUDENT IN AN ORGANIZED HEALTH CARE EDUCATION/TRAINING PROGRAM

## 2018-06-04 PROCEDURE — 97535 SELF CARE MNGMENT TRAINING: CPT

## 2018-06-04 PROCEDURE — 25000003 PHARM REV CODE 250: Performed by: STUDENT IN AN ORGANIZED HEALTH CARE EDUCATION/TRAINING PROGRAM

## 2018-06-04 PROCEDURE — 85730 THROMBOPLASTIN TIME PARTIAL: CPT

## 2018-06-04 PROCEDURE — C1751 CATH, INF, PER/CENT/MIDLINE: HCPCS

## 2018-06-04 PROCEDURE — 99232 SBSQ HOSP IP/OBS MODERATE 35: CPT | Mod: GC,,, | Performed by: ANESTHESIOLOGY

## 2018-06-04 PROCEDURE — 99232 SBSQ HOSP IP/OBS MODERATE 35: CPT | Mod: ,,, | Performed by: NURSE PRACTITIONER

## 2018-06-04 PROCEDURE — 63600175 PHARM REV CODE 636 W HCPCS: Performed by: PHYSICIAN ASSISTANT

## 2018-06-04 PROCEDURE — 25000003 PHARM REV CODE 250: Performed by: NURSE PRACTITIONER

## 2018-06-04 PROCEDURE — A4217 STERILE WATER/SALINE, 500 ML: HCPCS | Performed by: STUDENT IN AN ORGANIZED HEALTH CARE EDUCATION/TRAINING PROGRAM

## 2018-06-04 PROCEDURE — 85610 PROTHROMBIN TIME: CPT

## 2018-06-04 PROCEDURE — 25000003 PHARM REV CODE 250: Performed by: THORACIC SURGERY (CARDIOTHORACIC VASCULAR SURGERY)

## 2018-06-04 PROCEDURE — 99900035 HC TECH TIME PER 15 MIN (STAT)

## 2018-06-04 PROCEDURE — 80053 COMPREHEN METABOLIC PANEL: CPT

## 2018-06-04 PROCEDURE — 20000000 HC ICU ROOM

## 2018-06-04 PROCEDURE — 92526 ORAL FUNCTION THERAPY: CPT

## 2018-06-04 PROCEDURE — 93010 ELECTROCARDIOGRAM REPORT: CPT | Mod: ,,, | Performed by: INTERNAL MEDICINE

## 2018-06-04 PROCEDURE — 36569 INSJ PICC 5 YR+ W/O IMAGING: CPT

## 2018-06-04 PROCEDURE — 83735 ASSAY OF MAGNESIUM: CPT | Mod: 91

## 2018-06-04 PROCEDURE — 94761 N-INVAS EAR/PLS OXIMETRY MLT: CPT

## 2018-06-04 RX ORDER — RAMELTEON 8 MG/1
8 TABLET ORAL NIGHTLY
Status: DISCONTINUED | OUTPATIENT
Start: 2018-06-04 | End: 2018-06-08

## 2018-06-04 RX ORDER — SODIUM CHLORIDE 0.9 % (FLUSH) 0.9 %
10 SYRINGE (ML) INJECTION
Status: DISCONTINUED | OUTPATIENT
Start: 2018-06-04 | End: 2018-06-20 | Stop reason: HOSPADM

## 2018-06-04 RX ORDER — HYDRALAZINE HYDROCHLORIDE 20 MG/ML
10 INJECTION INTRAMUSCULAR; INTRAVENOUS ONCE
Status: COMPLETED | OUTPATIENT
Start: 2018-06-04 | End: 2018-06-04

## 2018-06-04 RX ORDER — NAPROXEN SODIUM 220 MG/1
325 TABLET, FILM COATED ORAL DAILY
Status: DISCONTINUED | OUTPATIENT
Start: 2018-06-04 | End: 2018-06-08

## 2018-06-04 RX ORDER — WARFARIN 2 MG/1
2 TABLET ORAL DAILY
Status: DISCONTINUED | OUTPATIENT
Start: 2018-06-04 | End: 2018-06-05

## 2018-06-04 RX ORDER — SODIUM CHLORIDE 0.9 % (FLUSH) 0.9 %
10 SYRINGE (ML) INJECTION EVERY 6 HOURS
Status: DISCONTINUED | OUTPATIENT
Start: 2018-06-04 | End: 2018-06-20 | Stop reason: HOSPADM

## 2018-06-04 RX ORDER — HYDRALAZINE HYDROCHLORIDE 20 MG/ML
20 INJECTION INTRAMUSCULAR; INTRAVENOUS EVERY 6 HOURS PRN
Status: DISCONTINUED | OUTPATIENT
Start: 2018-06-04 | End: 2018-06-20 | Stop reason: HOSPADM

## 2018-06-04 RX ADMIN — ASPIRIN 81 MG 324 MG: 81 TABLET ORAL at 09:06

## 2018-06-04 RX ADMIN — AMPICILLIN 2 G: 2 INJECTION, POWDER, FOR SOLUTION INTRAVENOUS at 11:06

## 2018-06-04 RX ADMIN — AMPICILLIN 2 G: 2 INJECTION, POWDER, FOR SOLUTION INTRAVENOUS at 04:06

## 2018-06-04 RX ADMIN — AMLODIPINE BESYLATE 10 MG: 10 TABLET ORAL at 08:06

## 2018-06-04 RX ADMIN — SOYBEAN OIL 250 ML: 20 INJECTION, SOLUTION INTRAVENOUS at 09:06

## 2018-06-04 RX ADMIN — FAMOTIDINE 20 MG: 20 TABLET ORAL at 08:06

## 2018-06-04 RX ADMIN — RAMELTEON 8 MG: 8 TABLET, FILM COATED ORAL at 09:06

## 2018-06-04 RX ADMIN — Medication 3 ML: at 02:06

## 2018-06-04 RX ADMIN — DEXTROSE: 5 SOLUTION INTRAVENOUS at 03:06

## 2018-06-04 RX ADMIN — HYDRALAZINE HYDROCHLORIDE 20 MG: 20 INJECTION INTRAMUSCULAR; INTRAVENOUS at 11:06

## 2018-06-04 RX ADMIN — MIRTAZAPINE 7.5 MG: 7.5 TABLET ORAL at 09:06

## 2018-06-04 RX ADMIN — AMPICILLIN 2 G: 2 INJECTION, POWDER, FOR SOLUTION INTRAVENOUS at 03:06

## 2018-06-04 RX ADMIN — ASPIRIN 81 MG 81 MG: 81 TABLET ORAL at 08:06

## 2018-06-04 RX ADMIN — INSULIN ASPART 5 UNITS: 100 INJECTION, SOLUTION INTRAVENOUS; SUBCUTANEOUS at 07:06

## 2018-06-04 RX ADMIN — CALCIUM GLUCONATE: 94 INJECTION, SOLUTION INTRAVENOUS at 09:06

## 2018-06-04 RX ADMIN — CEFTRIAXONE SODIUM 2 G: 2 INJECTION, POWDER, FOR SOLUTION INTRAMUSCULAR; INTRAVENOUS at 03:06

## 2018-06-04 RX ADMIN — AMPICILLIN 2 G: 2 INJECTION, POWDER, FOR SOLUTION INTRAVENOUS at 10:06

## 2018-06-04 RX ADMIN — WARFARIN SODIUM 2 MG: 2 TABLET ORAL at 04:06

## 2018-06-04 RX ADMIN — HYDRALAZINE HYDROCHLORIDE 10 MG: 20 INJECTION INTRAMUSCULAR; INTRAVENOUS at 01:06

## 2018-06-04 RX ADMIN — POLYETHYLENE GLYCOL 3350 17 G: 17 POWDER, FOR SOLUTION ORAL at 08:06

## 2018-06-04 RX ADMIN — DOXAZOSIN MESYLATE 4 MG: 2 TABLET ORAL at 08:06

## 2018-06-04 RX ADMIN — ATORVASTATIN CALCIUM 40 MG: 20 TABLET, FILM COATED ORAL at 08:06

## 2018-06-04 RX ADMIN — INSULIN ASPART 5 UNITS: 100 INJECTION, SOLUTION INTRAVENOUS; SUBCUTANEOUS at 04:06

## 2018-06-04 RX ADMIN — CEFTRIAXONE SODIUM 2 G: 2 INJECTION, POWDER, FOR SOLUTION INTRAMUSCULAR; INTRAVENOUS at 04:06

## 2018-06-04 NOTE — PROGRESS NOTES
[facility]  Critical Care - Surgery  Progress Note    Patient Name: Rodolfo Messina  MRN: [id number]  Admission Date: 5/23/2018  Hospital Length of Stay: 12 days  Code Status: Full Code  Attending Provider: Marvin Go MD  Primary Care Provider: Deric Claudio MD   Principal Problem: S/P MVR (mitral valve replacement)    Subjective:     Hospital/ICU Course:  No notes on file    Interval History/Significant Events:     Overnight patient had a small run of sinus tachycardia which spontaneously resolved. There were no other acute overnight events. Patient has been tolerating TF at goal of 50 cc/hr. He continues to receive free water boluses Q6. D5 is running @ 50 cc/hr. He was resting comfortably this morning and had no acute complaints or concerns. Plan is for barium swallow today.        Follow-up For: Procedure(s) (LRB):  Mitral Valve Replacement (N/A)  AORTOCORONARY BYPASS-CABG (N/A)    Post-Operative Day: 10 Days Post-Op    Objective:     Vital Signs (Most Recent):  Temp: 98.3 °F (36.8 °C) (06/04/18 0300)  Pulse: 75 (06/04/18 0300)  Resp: (!) 23 (06/04/18 0300)  BP: (!) 141/64 (06/04/18 0300)  SpO2: 99 % (06/04/18 0300) Vital Signs (24h Range):  Temp:  [97.9 °F (36.6 °C)-98.9 °F (37.2 °C)] 98.3 °F (36.8 °C)  Pulse:  [] 75  Resp:  [13-23] 23  SpO2:  [93 %-100 %] 99 %  BP: (117-151)/(47-70) 141/64     Weight: 76.2 kg (167 lb 15.9 oz)  Body mass index is 24.1 kg/m².      Intake/Output Summary (Last 24 hours) at 06/04/18 0543  Last data filed at 06/04/18 0400   Gross per 24 hour   Intake          3776.57 ml   Output             2075 ml   Net          1701.57 ml       Physical Exam   Constitutional: He is oriented to person, place, and time. He appears well-developed and well-nourished. No distress.   HENT:   Head: Normocephalic and atraumatic.   NG tube in place   Eyes: Conjunctivae are normal. No scleral icterus.   Cardiovascular: Normal rate.    Murmur heard.  Sternal incision c/d/i.  No wound dehiscence. No purulent drainage, surrounding erythema, warmth or induration   Pulmonary/Chest: Effort normal. No respiratory distress.   Abdominal: Soft. He exhibits no distension. There is no tenderness.   Genitourinary:   Genitourinary Comments: Lainez catheter in place with clear yellow urine in bag   Neurological: He is alert and oriented to person, place, and time.   Skin: Skin is warm and dry. No rash noted. He is not diaphoretic.   Psychiatric: He has a normal mood and affect. His behavior is normal.       Vents:  Vent Mode: Spont (05/26/18 2025)  Ventilator Initiated: Yes (05/25/18 1509)  Set Rate: 0 bmp (05/26/18 2025)  Vt Set: 500 mL (05/26/18 2025)  Pressure Support: 10 cmH20 (05/26/18 2025)  PEEP/CPAP: 5 cmH20 (05/26/18 2025)  Oxygen Concentration (%): 24 (05/29/18 0402)  Peak Airway Pressure: 16 cmH2O (05/26/18 2025)  Plateau Pressure: 0 cmH20 (05/26/18 2025)  Total Ve: 11.8 mL (05/26/18 2025)  F/VT Ratio<105 (RSBI): (!) 32.76 (05/26/18 2025)    Lines/Drains/Airways     Peripherally Inserted Central Catheter Line                 PICC Double Lumen 03/05/18 1539 right basilic 90 days          Central Venous Catheter Line                 Percutaneous Central Line Insertion/Assessment - Quad lumen  05/25/18 0800 left subclavian 9 days          Drain                 NG/OG Tube 05/29/18 1609 nasogastric 16 Fr. Right nostril 5 days         Urethral Catheter 05/30/18 0445 Straight-tip 16 Fr. 5 days          Line                 Pacer Wires 05/25/18 1355 9 days          Peripheral Intravenous Line                 Midline Catheter Insertion/Assessment  - Single Lumen 05/30/18 1623 Right basilic vein (medial side of arm) 18g x 8cm 4 days         Peripheral IV - Single Lumen 06/03/18 1910 Right Forearm less than 1 day                Significant Labs:    CBC/Anemia Profile:    Recent Labs  Lab 06/03/18  0429 06/04/18  0406   WBC 9.49 8.95   HGB 6.8* 6.8*   HCT 21.6* 21.5*   * 127*   MCV 97 98   RDW  16.7* 16.6*        Chemistries:    Recent Labs  Lab 06/02/18  2120 06/03/18  0011 06/03/18  0429 06/03/18  1635 06/04/18  0406   NA  --  153* 153* 151* 153*   K 4.0 4.3 4.0  4.0 4.2 4.1   CL  --  115* 116* 115* 115*   CO2  --  30* 30* 28 28   BUN  --  100* 97* 90* 84*   CREATININE  --  1.5* 1.4 1.4 1.6*   CALCIUM  --  9.6 9.6 9.5 9.7   ALBUMIN  --  2.5* 2.5*  --  2.5*   PROT  --  5.9* 5.7*  --  5.8*   BILITOT  --  0.9 0.9  --  0.6   ALKPHOS  --  102 106  --  106   ALT  --  53* 52*  --  45*   AST  --  51* 45*  --  40   MG 2.3  --  2.5  --  2.5     Assessment/Plan:     Chronic systolic congestive heart failure     68yo M with PMH of mitral valve endocarditis and resultant severe MR, TR and pulmonary HTN who is now s/p IABP placement (5/24) and s/pMVR and CABG 5/25.     Plan:     Neuro:   - Mental Status significantly improved. Recommend continued delirium precautions.   - AAOx4 this AM.   - Pain control per CTS.     Pulmonary:   - Currently on RA.   - Continuous pulse ox.   - Supplement O2 if necessary to maintain sats.   - ABGs/CXR if distressed.        Cardiac:  - MAP goal >65  - Paced at 80  - Home amlodipine, ASA, and statin daily.  - TTE revealed LA thrombus. Therefore no DCCV at this time. EPS thinks amelia function may return in time. Started theophyline.   - Coumadin therapy.  - Continuous cardiac monitoring  - Pathology from valve returned with active subacute endocarditis. S/p ID consultation. PICC placed for long term abx administration.  Per ID Ampicillin and Ceftriaxone x 6 weeks for Enterococcal SBE (end date 7/13/18). In addition, patient will need ESR, CRP, CBC and CMP to be drawn weekly with results faxed to the ID Department at [phone] set up prior to discharge. Appreciate sw assistance.      Renal:   -UOP adequate.   -Bun/Cr stable.   -Lasix gtt off.   -Flomax daily given home use.  - MIVF.      Fluids/Electrolytes/Nutrition/GI:   - MIVF   - Na remains elevated currently on free water  boluses.  - NPO. Pending barium swallow on 6/4/18.   - TFs well tolerated.  - Replace lytes PRN.  - Bowel regimen.     Hematology/Oncology:  - H/H stable. Will continue to monitor.  -INR daily  -CBC daily     Infectious Disease:   -Afebrile  -WBC wnl  -CBC daily   -Pathology from valve with active endocarditis, ID consulted, recommend Ampicillin and Ceftriaxone x 6 weeks for Enterococcal SBE (end date 7/13/18). In addition, patient will need ESR, CRP, CBC and CMP to be drawn weekly with results faxed to the ID Department at [phone] set up prior to discharge. Appreciate sw assistance.      Endocrine:  - No h/o DM or thyroid dysfunction  - s/p Endocrine evaluation. Continue following recs.   - Glucose goal of 120-180     Dispo:  - CTS primary. Likely step down on 6/4/18 or 6/5/18.                      Critical care was time spent personally by me on the following activities: development of treatment plan with patient or surrogate and bedside caregivers, discussions with consultants, evaluation of patient's response to treatment, examination of patient, ordering and performing treatments and interventions, ordering and review of laboratory studies, ordering and review of radiographic studies, pulse oximetry, re-evaluation of patient's condition.  This critical care time did not overlap with that of any other provider or involve time for any procedures.     Patrick Hawkins MD  Critical Care - Surgery  [facility]

## 2018-06-04 NOTE — PLAN OF CARE
Problem: Patient Care Overview  Goal: Plan of Care Review  Outcome: Ongoing (interventions implemented as appropriate)  Plan of care reviewed with patient and patient's spouse by Dr. Saldaña and SHAWNEE Trevino, RN; all questions and concerns answered appropriately. Pt remains on D5% IV drip. Sats 100% on room air. VSS. CARLYN. Barium swallow study done today; tolerated well. NGT removed  For barium swallow study; OK per Dr. Go. TPN ordered to start tonight due to pt having delayed cough after swallowing. PICC placed; X-ray done. Pt denies pain. No breakdown to sacrum or back of head; pt positions/repositions independently. OOB to chair x12 hours; tolerated well. NPO except sips with medications. Adequate UO; lackey to be removed tomorrow AM per Dr. Hawkins. See flowsheet for full assessment. Will continue to monitor patient closely.

## 2018-06-04 NOTE — PLAN OF CARE
Problem: Patient Care Overview  Goal: Individualization & Mutuality  Dx: CHF  Hx: mitral valve endocarditis and resultant severe MR, TR and pulmonary HTN, EF 36%, diverticulosus, BPH    5/23: admit pre-op  5/24: IABP  5/25: OR for MV replacement, CABGx1  5/26: IABP d/c, 500mL albumin, lasix gtt started. Extubated.  5/27: Cardene restarted, central line d/c      Nursing:  MAP 60-80         Outcome: Ongoing (interventions implemented as appropriate)  Alert and oriented. Continues with TF @50. Episode of tachycardia sustained for 4 minutes of 130-140. ingrahm notified and aware, however returned to normal rhythm.  Barium swallow today. Up to chair with 1 person assist. Spouse in room and plan of care discussed with both. Free from falls. afebrile

## 2018-06-04 NOTE — PROGRESS NOTES
[facility]  Cardiothoracic Surgery  Progress Note    Patient Name: Rodolfo Messina  MRN: [id number]  Admission Date: 5/23/2018  Hospital Length of Stay: 12 days  Code Status: Full Code   Attending Physician: Marvin Go MD   Referring Provider: Marvin Go MD  Principal Problem:S/P MVR (mitral valve replacement)    Subjective:     Post-Op Info:  Procedure(s) (LRB):  Mitral Valve Replacement (N/A)  AORTOCORONARY BYPASS-CABG (N/A)   10 Days Post-Op     Interval History: No acute events overnight. Doing well this am, OOBTC.    Medications:  Continuous Infusions:   dextrose 5 % 50 mL/hr at 06/04/18 0800     Scheduled Meds:   amLODIPine  10 mg Per NG tube Daily    ampicillin IVPB  2 g Intravenous Q6H    aspirin  81 mg Per NG tube Daily    atorvastatin  40 mg Per NG tube Daily    cefTRIAXone (ROCEPHIN) IVPB  2 g Intravenous Q12H    doxazosin  4 mg Per NG tube Daily    famotidine  20 mg Per NG tube Daily    insulin aspart U-100  5 Units Subcutaneous Q24H    insulin aspart U-100  5 Units Subcutaneous Q24H    insulin aspart U-100  5 Units Subcutaneous Q24H    insulin aspart U-100  5 Units Subcutaneous Q24H    insulin aspart U-100  5 Units Subcutaneous Q24H    insulin aspart U-100  5 Units Subcutaneous Q24H    mirtazapine  7.5 mg Per NG tube QHS    polyethylene glycol  17 g Per NG tube Daily    sodium chloride 0.9%  3 mL Intravenous Q8H    warfarin  3 mg Per NG tube Daily     PRN Meds:albumin human 5%, albuterol-ipratropium, bisacodyl, dextrose 50%, glucagon (human recombinant), hydrALAZINE, insulin aspart U-100, lactated ringers, metoclopramide HCl, ondansetron, oxyCODONE, white petrolatum-mineral oil     Objective:     Vital Signs (Most Recent):  Temp: 98.6 °F (37 °C) (06/04/18 0700)  Pulse: 77 (06/04/18 0834)  Resp: (!) 25 (06/04/18 0834)  BP: (!) 124/58 (06/04/18 0800)  SpO2: 99 % (06/04/18 0834) Vital Signs (24h Range):  Temp:  [98 °F (36.7 °C)-98.9 °F (37.2 °C)] 98.6 °F (37  °C)  Pulse:  [] 77  Resp:  [13-25] 25  SpO2:  [94 %-100 %] 99 %  BP: (117-141)/(47-66) 124/58     Weight: 76.2 kg (167 lb 15.9 oz)  Body mass index is 24.1 kg/m².    SpO2: 99 %  O2 Device (Oxygen Therapy): room air    Intake/Output - Last 3 Shifts       06/02 0700 - 06/03 0659 06/03 0700 - 06/04 0659 06/04 0700 - 06/05 0659    I.V. (mL/kg) 1788.6 (23.8) 1480.5 (19.4)     NG/GT 2420 2140 150    Total Intake(mL/kg) 4208.6 (55.9) 3620.5 (47.5) 150 (2)    Urine (mL/kg/hr) 1940 (1.1) 2070 (1.1) 150 (0.9)    Total Output 1940 2070 150    Net +2268.6 +1550.5 0                 Lines/Drains/Airways     Peripherally Inserted Central Catheter Line                 PICC Double Lumen 03/05/18 1539 right basilic 90 days          Central Venous Catheter Line                 Percutaneous Central Line Insertion/Assessment - Quad lumen  05/25/18 0800 left subclavian 10 days          Drain                 NG/OG Tube 05/29/18 1609 nasogastric 16 Fr. Right nostril 5 days         Urethral Catheter 05/30/18 0445 Straight-tip 16 Fr. 5 days          Line                 Pacer Wires 05/25/18 1355 9 days          Peripheral Intravenous Line                 Midline Catheter Insertion/Assessment  - Single Lumen 05/30/18 1623 Right basilic vein (medial side of arm) 18g x 8cm 4 days         Peripheral IV - Single Lumen 06/03/18 1910 Right Forearm less than 1 day                Physical Exam   Constitutional: He appears well-developed.   Cardiovascular: Normal rate.    A-fib   Pulmonary/Chest: Effort normal.   Abdominal: Soft.   Neurological: He is alert.   Skin: Skin is warm and dry.       Significant Labs:  CBC:   Recent Labs  Lab 06/04/18  0406   WBC 8.95   RBC 2.19*   HGB 6.8*   HCT 21.5*   *   MCV 98   MCH 31.1*   MCHC 31.6*     CMP:   Recent Labs  Lab 06/04/18  0406   *   CALCIUM 9.7   ALBUMIN 2.5*   PROT 5.8*   *   K 4.1   CO2 28   *   BUN 84*   CREATININE 1.6*   ALKPHOS 106   ALT 45*   AST 40   BILITOT 0.6      Coagulation:   Recent Labs  Lab 06/04/18  0406   INR 2.9*   APTT 39.1*     Assessment/Plan:     * S/P MVR (mitral valve replacement)    --Continue ASA, statin  --Sternal precautions  --PT/OT  --Ambulate x4   --Wean O2 as tolerated for O2 sat >92%  --monitor CXR    --Monitor electrolytes and replace prn  --hold Lasix  --D5W @50  -- Q6  --echo completed 5/30 to r/o tamponade- negative    --d/c darya   --strict I&Os  --ID consulted for active endocarditis in native valve  --Abx: rocephin, amoxacillin        DISPO: continue ICU care          Epistaxis    --Noted to have post traumatic left sided epistaxis following attempted NGT placement. On the time of evaluation patient was hemostatic. No active bleeding noted.     -Given that patient is hemostatic, no acute intervention   -Recommend minimize nasal trauma as best possible  -If requires oxygen, ideal if it is humidified via face tent and not nasal canula  -Bacitracin to bilateral nares nightly  -Afrin q6hrs PRN bleeding (only if ok from Cards prospective)  -[place]cean nasal spray q6hrs while awake (starting in a couple days, allow stable clot to form for now)  -If bleeding restarts, hold pressure to fleshy portion of nose (10 mins) and lean forward  -For bleeding that does not stop, page resident  -Remainder of medical management per primary team        Atrial flutter    --EP following appreciate recs.   --holding BB  --external pacer set at 80        Chronic systolic congestive heart failure    - ASA, statin          ANNA (acute kidney injury)    --Cr elevated but stable; hold lasix, continue to monitor  --Good UOP            Vishnu Hawkins MD  Cardiothoracic Surgery  [facility]

## 2018-06-04 NOTE — PROCEDURES
Modified Barium Swallow    Patient Name:  Rodolfo Messina   MRN:  [id number]      Recommendations:     Recommendations:                General Recommendations:  Dysphagia therapy  Diet recommendations:  NPO, NPO; Puree & honey thick liquids ok for pleasure with 2-3 swallows per bolus, strict aspiration precautions listed below & discontinue PO if ANY s/s aspiration (coughing, throat clearing, wet vocal quality)   Aspiration Precautions: 1 bite/sip at a time, 1:1 Assistance with PO to ensure monitoring of s/s aspiration, Assistance with thickening liquids, 2-3 swallows with each bite/sip, Feed only when awake/alert, HOB to 90 degrees, ok for Meds crushed in puree, Monitor for s/s of aspiration, No straws, Small bites/sips  General Precautions: Standard, fall, aspiration    Referral     Reason for Referral  Patient was referred for a Modified Barium Swallow Study to assess the efficiency of his/her swallow function, rule out aspiration and make recommendations regarding safe dietary consistencies, effective compensatory strategies, and safe eating environment.     Diagnosis: S/P MVR (mitral valve replacement)       History:     Past Medical History:   Diagnosis Date    ANNA (acute kidney injury) 2/22/2018    Anemia     Anxiety     Arthritis     BPH (benign prostatic hyperplasia)     CHF (congestive heart failure) 5/23/2018    Coronary artery disease of native artery of native heart with stable angina pectoris 5/21/2018    Diverticulosis     Encounter for blood transfusion     Hypertension     Urinary retention        Objective:     Current Respiratory Status: 06/04/18    Alert: yes    Cooperative: yes    Follows Directions: yes    Visualization  · Patient was seen in the lateral view    Oral Peripheral Examination  · Oral Musculature: WFL  · Dentition: present and adequate  · Secretion Management: adequate  · Mucosal Quality: dry  · Mandibular Strength and Mobility: WFL  · Oral Labial Strength and Mobility:  WFL  · Lingual Strength and Mobility: WFL  · Velar Elevation: WFL  · Buccal Strength and Mobility: WFL  · Volitional Cough: elciited, reduced   · Volitional Swallow: elciited   · Voice Prior to PO Intake: clear    Consistencies Assessed  · Thin 5 mL via tsp x2  · Nectar thick 5mL via tsp x3, small cup sip x1  · Honey [id number] via tsp 6  · Puree 5 mL via tsp 5    Oral Preparation/Oral Phase  · Decreased base of tongue mobility    Pharyngeal Phase   Pharyngeal phase c/b decreased epiglottic inversion, decreased hyolaryngeal excursion & rise, decreased pharyngeal constriction, decreased base of tongue retraction    NG tube appeared to be further impeding epiglottic inversion as epiglottis seemed to be limited in motion by hitting up against tube. NG tube was removed during study to eliminate any factors that may have impacted swallow function. SLP & radiologist given ok to remove per communication with Dr. Go.     With NG tube in place  Thin: penetration during swallow, mod vallecular & pyriform stasis, continued supraglottic penetration with each dry swallow if effort to clear stasis    Nectar: (2 sips via spoon & 1 sip via cup)   tsp sips - flash penetration during swallow, vallecular & base of tongue stasis, continued flash penetration when generating dry swallows to clear stasis  cup sip - aspiration during swallow, continued penetration as pt generated dry swallows in attempt  to clear vallecular & base of tongue stasis, delayed cough response  Stasis not cleared    Honey: (2 tsp via spoon)  consistent penetration during swallow & after swallow as pt generated dry swallows in attempt to clear vallecular stasis where epiglottis hitting NG, unable to clear stasis with multiple swallows    Puree: (2 tsp)  consistent penetration during swallow & after swallow as pt generated dry swallows to clear vallecular stasis where epiglottis hitting NG, unable to clear stasis with multiple swallows     Without NG in place -  relatively increased epiglottic inversion allowing for increased airway protection & less vallecular stasis  Puree: (1st 3 teaspoons)  - no penetration/aspiration, mild vallecular stasis cleared with dry swallow x1  (Last 2 teaspoons) - No penetration/aspiration, mod vallecular stasis, pt required 2-3 dry swallows to achieve mild stasis     Honey: (4 tsp via spoon)  No penetration/aspiration, mod vallecular & mild posterior pharyngeal wall stasis, pt required 2-3 dry swallows to achieve mild vallecular stasis, pharyngeal wall cleared  PT IS AT RISK OF ASPIRATION IF UNABLE TO COMPLETE 2-3 STRONG DRY SWALLOWS FOLLOWING EACH BITE/SIP AS STASIS WILL BEGIN TO ACCUMULATE & MAY SPILL IN TO AIRWAY    [place]: (1 tsp via spoon)  Supraglottic penetration during swallow, vallecular stasis cleared with dry swallow, delayed cough response - suspected aspiration       ++SLP went to pt's room after study to discuss with pt, wife & NSG regarding results, recommendations, strategies, instructions for thickening, precautions, s/s aspiration, risks of aspiration, etc. SLP notified them that pt will be closely monitored & if pt is unable to consistently utilize strategies or showing any s/s aspiration at bedside, rec may be changed to NPO. Wife verbalized understanding.       +++SLP returned to assess pt during full meal. Pt appears to demonstrate fatigue, not consistently completing strategies (2-3 swallows) & showing s/s aspiration (throat clearing & delayed coughing). SLP communicated with NSG & Dr. Go regarding lack of endurance/safety for full meal.     Noted - Pt & wife expressed their preference for PEG as alternative means if necessary, rather than NG tube.       Cervical Esophageal Phase  · UES appeared to accommodate all bolus types without stasis or retrograde movement observed     Assessment:     Impressions  ·   Patient demonstrates moderate Oropharyngeal dysphagia. Pt with lack of strength/endurance to safely consume  adequate nutrition/hydration while following necessary compensatory strategies at this time.     Prognosis: Good    Barriers:  · Fatigue, weakness, overall debility    Plan  Continue dysphagia therapy & ongoing swallow assessment    Education  Results were discussed with patient. Results were discussed with Medical Team who was in agreement with plan.     Goals:    SLP Goals        Problem: SLP Goal    Goal Priority Disciplines Outcome   SLP Goal     SLP Ongoing (interventions implemented as appropriate)   Description:  Speech Language Pathology Goals  Goals expected to be met by 6/11  1. Pt will tolerate puree & honey thickened liquids for pleasure while completing 2-3 swallows per bolus w/o overt S/S aspiration, MIN A   3. Educate Pt and family on safe swallow strategies and S/S aspiration   4. Continue to assess swallow to determine feasibility of PO upgrade   5. Pt will complete dysphagia exercises given min A      Speech Language Pathology Goals  Goals expected to be met by 6/7:  1. Pt will tolerate trials of puree w/o overt S/S aspiration, MIN A - CONTINUE  2. Pt will tolerate trials of nectar-thickened liquids w/o overt S/S aspiration, MIN A - DISCONTINUE  3. Educate Pt and family on safe swallow strategies and S/S aspiration - CONTINUE  4. Continue to assess swallow to determine feasibility of PO upgrade - CONTINUE      Goals expected to be met by 6/4: goals not met 2/2 transfer to ICU 5/30  1. Pt will participate in ongoing swallowing assessment to determine safest and least restrictive diet.                           Plan:   · Patient to be seen:  Therapy Frequency: 5 x/week   · Plan of Care expires:  06/30/18  · Plan of Care reviewed with:  patient, spouse        Discharge recommendations:  home health speech therapy     Time Tracking:   SLP Treatment Date:   06/04/18  Speech Start Time:  0945  Speech Stop Time:  1030     11-11:10  1-1:15  Speech Total Time (min):  45 min    Virginia Mccall  CCC-SLP  06/04/2018   [phone]

## 2018-06-04 NOTE — PT/OT/SLP PROGRESS
Physical Therapy      Patient Name:  Rodolfo Messina   MRN:  [id number]    Patient not seen today secondary to Unavailable (Comment). Pt away at X-ray.  Will follow-up as appropriate.    Noreen Raymundo, PT   6/4/2018

## 2018-06-04 NOTE — PROGRESS NOTES
Pt returned to SICU 6090 via stretcher connected to portable CM on room air; VSS. Upon arrival pt connected to CM. Family at bedside. Will continue to monitor patient closely.

## 2018-06-04 NOTE — SUBJECTIVE & OBJECTIVE
Interval History: No acute events overnight. Doing well this am, OOBTC.    Medications:  Continuous Infusions:   dextrose 5 % 50 mL/hr at 06/04/18 0800     Scheduled Meds:   amLODIPine  10 mg Per NG tube Daily    ampicillin IVPB  2 g Intravenous Q6H    aspirin  81 mg Per NG tube Daily    atorvastatin  40 mg Per NG tube Daily    cefTRIAXone (ROCEPHIN) IVPB  2 g Intravenous Q12H    doxazosin  4 mg Per NG tube Daily    famotidine  20 mg Per NG tube Daily    insulin aspart U-100  5 Units Subcutaneous Q24H    insulin aspart U-100  5 Units Subcutaneous Q24H    insulin aspart U-100  5 Units Subcutaneous Q24H    insulin aspart U-100  5 Units Subcutaneous Q24H    insulin aspart U-100  5 Units Subcutaneous Q24H    insulin aspart U-100  5 Units Subcutaneous Q24H    mirtazapine  7.5 mg Per NG tube QHS    polyethylene glycol  17 g Per NG tube Daily    sodium chloride 0.9%  3 mL Intravenous Q8H    warfarin  3 mg Per NG tube Daily     PRN Meds:albumin human 5%, albuterol-ipratropium, bisacodyl, dextrose 50%, glucagon (human recombinant), hydrALAZINE, insulin aspart U-100, lactated ringers, metoclopramide HCl, ondansetron, oxyCODONE, white petrolatum-mineral oil     Objective:     Vital Signs (Most Recent):  Temp: 98.6 °F (37 °C) (06/04/18 0700)  Pulse: 77 (06/04/18 0834)  Resp: (!) 25 (06/04/18 0834)  BP: (!) 124/58 (06/04/18 0800)  SpO2: 99 % (06/04/18 0834) Vital Signs (24h Range):  Temp:  [98 °F (36.7 °C)-98.9 °F (37.2 °C)] 98.6 °F (37 °C)  Pulse:  [] 77  Resp:  [13-25] 25  SpO2:  [94 %-100 %] 99 %  BP: (117-141)/(47-66) 124/58     Weight: 76.2 kg (167 lb 15.9 oz)  Body mass index is 24.1 kg/m².    SpO2: 99 %  O2 Device (Oxygen Therapy): room air    Intake/Output - Last 3 Shifts       06/02 0700 - 06/03 0659 06/03 0700 - 06/04 0659 06/04 0700 - 06/05 0659    I.V. (mL/kg) 1788.6 (23.8) 1480.5 (19.4)     NG/GT 2420 2140 150    Total Intake(mL/kg) 4208.6 (55.9) 3620.5 (47.5) 150 (2)    Urine (mL/kg/hr) 1940  (1.1) 2070 (1.1) 150 (0.9)    Total Output 1940 2070 150    Net +2268.6 +1550.5 0                 Lines/Drains/Airways     Peripherally Inserted Central Catheter Line                 PICC Double Lumen 03/05/18 1539 right basilic 90 days          Central Venous Catheter Line                 Percutaneous Central Line Insertion/Assessment - Quad lumen  05/25/18 0800 left subclavian 10 days          Drain                 NG/OG Tube 05/29/18 1609 nasogastric 16 Fr. Right nostril 5 days         Urethral Catheter 05/30/18 0445 Straight-tip 16 Fr. 5 days          Line                 Pacer Wires 05/25/18 1355 9 days          Peripheral Intravenous Line                 Midline Catheter Insertion/Assessment  - Single Lumen 05/30/18 1623 Right basilic vein (medial side of arm) 18g x 8cm 4 days         Peripheral IV - Single Lumen 06/03/18 1910 Right Forearm less than 1 day                Physical Exam   Constitutional: He appears well-developed.   Cardiovascular: Normal rate.    A-fib   Pulmonary/Chest: Effort normal.   Abdominal: Soft.   Neurological: He is alert.   Skin: Skin is warm and dry.       Significant Labs:  CBC:   Recent Labs  Lab 06/04/18  0406   WBC 8.95   RBC 2.19*   HGB 6.8*   HCT 21.5*   *   MCV 98   MCH 31.1*   MCHC 31.6*     CMP:   Recent Labs  Lab 06/04/18  0406   *   CALCIUM 9.7   ALBUMIN 2.5*   PROT 5.8*   *   K 4.1   CO2 28   *   BUN 84*   CREATININE 1.6*   ALKPHOS 106   ALT 45*   AST 40   BILITOT 0.6     Coagulation:   Recent Labs  Lab 06/04/18  0406   INR 2.9*   APTT 39.1*

## 2018-06-04 NOTE — SUBJECTIVE & OBJECTIVE
Interval History/Significant Events:     Overnight patient had a small run of sinus tachycardia which spontaneously resolved. There were no other acute overnight events. Patient has been tolerating TF at goal of 50 cc/hr. He continues to receive free water boluses Q6. D5 is running @ 50 cc/hr. He was resting comfortably this morning and had no acute complaints or concerns. Plan is for barium swallow today.        Follow-up For: Procedure(s) (LRB):  Mitral Valve Replacement (N/A)  AORTOCORONARY BYPASS-CABG (N/A)    Post-Operative Day: 10 Days Post-Op    Objective:     Vital Signs (Most Recent):  Temp: 98.3 °F (36.8 °C) (06/04/18 0300)  Pulse: 75 (06/04/18 0300)  Resp: (!) 23 (06/04/18 0300)  BP: (!) 141/64 (06/04/18 0300)  SpO2: 99 % (06/04/18 0300) Vital Signs (24h Range):  Temp:  [97.9 °F (36.6 °C)-98.9 °F (37.2 °C)] 98.3 °F (36.8 °C)  Pulse:  [] 75  Resp:  [13-23] 23  SpO2:  [93 %-100 %] 99 %  BP: (117-151)/(47-70) 141/64     Weight: 76.2 kg (167 lb 15.9 oz)  Body mass index is 24.1 kg/m².      Intake/Output Summary (Last 24 hours) at 06/04/18 0543  Last data filed at 06/04/18 0400   Gross per 24 hour   Intake          3776.57 ml   Output             2075 ml   Net          1701.57 ml       Physical Exam   Constitutional: He is oriented to person, place, and time. He appears well-developed and well-nourished. No distress.   HENT:   Head: Normocephalic and atraumatic.   NG tube in place   Eyes: Conjunctivae are normal. No scleral icterus.   Cardiovascular: Normal rate.    Murmur heard.  Sternal incision c/d/i. No wound dehiscence. No purulent drainage, surrounding erythema, warmth or induration   Pulmonary/Chest: Effort normal. No respiratory distress.   Abdominal: Soft. He exhibits no distension. There is no tenderness.   Genitourinary:   Genitourinary Comments: Lainez catheter in place with clear yellow urine in bag   Neurological: He is alert and oriented to person, place, and time.   Skin: Skin is warm and  dry. No rash noted. He is not diaphoretic.   Psychiatric: He has a normal mood and affect. His behavior is normal.       Vents:  Vent Mode: Spont (05/26/18 2025)  Ventilator Initiated: Yes (05/25/18 1509)  Set Rate: 0 bmp (05/26/18 2025)  Vt Set: 500 mL (05/26/18 2025)  Pressure Support: 10 cmH20 (05/26/18 2025)  PEEP/CPAP: 5 cmH20 (05/26/18 2025)  Oxygen Concentration (%): 24 (05/29/18 0402)  Peak Airway Pressure: 16 cmH2O (05/26/18 2025)  Plateau Pressure: 0 cmH20 (05/26/18 2025)  Total Ve: 11.8 mL (05/26/18 2025)  F/VT Ratio<105 (RSBI): (!) 32.76 (05/26/18 2025)    Lines/Drains/Airways     Peripherally Inserted Central Catheter Line                 PICC Double Lumen 03/05/18 1539 right basilic 90 days          Central Venous Catheter Line                 Percutaneous Central Line Insertion/Assessment - Quad lumen  05/25/18 0800 left subclavian 9 days          Drain                 NG/OG Tube 05/29/18 1609 nasogastric 16 Fr. Right nostril 5 days         Urethral Catheter 05/30/18 0445 Straight-tip 16 Fr. 5 days          Line                 Pacer Wires 05/25/18 1355 9 days          Peripheral Intravenous Line                 Midline Catheter Insertion/Assessment  - Single Lumen 05/30/18 1623 Right basilic vein (medial side of arm) 18g x 8cm 4 days         Peripheral IV - Single Lumen 06/03/18 1910 Right Forearm less than 1 day                Significant Labs:    CBC/Anemia Profile:    Recent Labs  Lab 06/03/18  0429 06/04/18  0406   WBC 9.49 8.95   HGB 6.8* 6.8*   HCT 21.6* 21.5*   * 127*   MCV 97 98   RDW 16.7* 16.6*        Chemistries:    Recent Labs  Lab 06/02/18  2120 06/03/18  0011 06/03/18  0429 06/03/18  1635 06/04/18  0406   NA  --  153* 153* 151* 153*   K 4.0 4.3 4.0  4.0 4.2 4.1   CL  --  115* 116* 115* 115*   CO2  --  30* 30* 28 28   BUN  --  100* 97* 90* 84*   CREATININE  --  1.5* 1.4 1.4 1.6*   CALCIUM  --  9.6 9.6 9.5 9.7   ALBUMIN  --  2.5* 2.5*  --  2.5*   PROT  --  5.9* 5.7*  --  5.8*    BILITOT  --  0.9 0.9  --  0.6   ALKPHOS  --  102 106  --  106   ALT  --  53* 52*  --  45*   AST  --  51* 45*  --  40   MG 2.3  --  2.5  --  2.5

## 2018-06-04 NOTE — CONSULTS
Double lumen PICC placed L brachial vein. Catheter length 43cm with 0cm exposed and 25cm arm circumference. Lot# [id number]

## 2018-06-04 NOTE — ASSESSMENT & PLAN NOTE
66yo M with PMH of mitral valve endocarditis and resultant severe MR, TR and pulmonary HTN who is now s/p IABP placement (5/24) and s/pMVR and CABG 5/25.     Plan:     Neuro:   - Mental Status significantly improved. Recommend continued delirium precautions.   - AAOx4 this AM.   - Pain control per CTS.     Pulmonary:   - Currently on RA.   - Continuous pulse ox.   - Supplement O2 if necessary to maintain sats.   - ABGs/CXR if distressed.        Cardiac:  - MAP goal >65  - Paced at 80  - Home amlodipine, ASA, and statin daily.  - TTE revealed LA thrombus. Therefore no DCCV at this time. EPS thinks amelia function may return in time. Started theophyline.   - Coumadin therapy.  - Continuous cardiac monitoring  - Pathology from valve returned with active subacute endocarditis. S/p ID consultation. PICC placed for long term abx administration.  Per ID Ampicillin and Ceftriaxone x 6 weeks for Enterococcal SBE (end date 7/13/18). In addition, patient will need ESR, CRP, CBC and CMP to be drawn weekly with results faxed to the ID Department at [phone] set up prior to discharge. Appreciate sw assistance.      Renal:   -UOP adequate.   -Bun/Cr stable.   -Lasix gtt off.   -Flomax daily given home use.  - MIVF.      Fluids/Electrolytes/Nutrition/GI:   - MIVF   - Na remains elevated currently on free water boluses.  - NPO. Pending barium swallow on 6/4/18.   - TFs well tolerated.  - Replace lytes PRN.  - Bowel regimen.     Hematology/Oncology:  - H/H stable. Will continue to monitor.  -INR daily  -CBC daily     Infectious Disease:   -Afebrile  -WBC wnl  -CBC daily   -Pathology from valve with active endocarditis, ID consulted, recommend Ampicillin and Ceftriaxone x 6 weeks for Enterococcal SBE (end date 7/13/18). In addition, patient will need ESR, CRP, CBC and CMP to be drawn weekly with results faxed to the ID Department at [phone] set up prior to discharge. Appreciate sw assistance.      Endocrine:  - No h/o DM or  thyroid dysfunction  - s/p Endocrine evaluation. Continue following recs.   - Glucose goal of 120-180     Dispo:  - CTS primary. Likely step down on 6/4/18 or 6/5/18.

## 2018-06-04 NOTE — PROCEDURES
"Rodolfo Messina is a 67 y.o. male patient.    Temp: 98.4 °F (36.9 °C) (06/04/18 1500)  Pulse: 82 (06/04/18 1600)  Resp: 17 (06/04/18 1600)  BP: 125/60 (06/04/18 1600)  SpO2: 100 % (06/04/18 1600)  Weight: 76.2 kg (167 lb 15.9 oz) (06/04/18 0407)  Height: 5' 10" (177.8 cm) (05/25/18 0500)    PICC  Date/Time: 6/4/2018 4:55 PM  Performed by: LOREE ARVIZU  Assisting provider: NITHIN LEY  Consent Done: Yes  Time out: Immediately prior to procedure a time out was called to verify the correct patient, procedure, equipment, support staff and site/side marked as required  Indications: med administration and vascular access  Anesthesia: local infiltration  Local anesthetic: lidocaine 1% without epinephrine  Anesthetic Total (mL): 2  Preparation: skin prepped with ChloraPrep  Skin prep agent dried: skin prep agent completely dried prior to procedure  Sterile barriers: all five maximum sterile barriers used - cap, mask, sterile gown, sterile gloves, and large sterile sheet  Hand hygiene: hand hygiene performed prior to central venous catheter insertion  Location details: left brachial  Catheter type: double lumen  Catheter size: 5 Fr  Catheter Length: 43cm    Ultrasound guidance: yes  Vessel Caliber: medium and patent, compressibility normal  Vascular Doppler: not done  Needle advanced into vessel with real time Ultrasound guidance.  Guidewire confirmed in vessel.  Image recorded and saved.  Sterile sheath used.  Number of attempts: 1  Post-procedure: blood return through all ports, chlorhexidine patch and sterile dressing applied  Technical procedures used: 3CG  Specimens: No  Implants: No  Assessment: placement verified by x-ray  Complications: none          Zunilda Daley  6/4/2018    "

## 2018-06-04 NOTE — ASSESSMENT & PLAN NOTE
BG goal 140-180 mg/dl  Continue Novolog 5 units Q4hr with TF  Low dose correction scale  Hold if TF is on hold    Please page endocrine NP on call with any BG related issues or concerns.     ADDENDUM: TPN 60cc/hr to start tonight; avoid IV insulin infusion protocol given po diet. Will assess needs in AM.       Discharge plans-TBD

## 2018-06-04 NOTE — PROGRESS NOTES
Dr. Hawkins notified [facility]'s 84. Unable to give PRN Hydralazine IV due to Q6H order specification. MD ordered Hydralazine 10 mg IV injection once. Will carry out order and continue to monitor patient closely.

## 2018-06-04 NOTE — SUBJECTIVE & OBJECTIVE
"Interval HPI:   Overnight events: remains in ICU, IVF with D 5 1/2 at 50cc/hr, aware creatinine 1.6  BG well controlled on Novolog SQ 5 units q4hr with TF  Eatin% of puree diet  Nausea: No  Hypoglycemia and intervention: No  Fever: No  TPN and/or TF: Yes  If yes, type of TF/TPN and rate: Impact Peptide 50cc/hr continuous    BP (!) 123/58 (BP Location: Left arm, Patient Position: Sitting)   Pulse 83   Temp 98.5 °F (36.9 °C) (Oral)   Resp 20   Ht 5' 10" (1.778 m)   Wt 76.2 kg (167 lb 15.9 oz)   SpO2 100%   BMI 24.10 kg/m²     Labs Reviewed and Include      Recent Labs  Lab 18  0406   *   CALCIUM 9.7   ALBUMIN 2.5*   PROT 5.8*   *   K 4.1   CO2 28   *   BUN 84*   CREATININE 1.6*   ALKPHOS 106   ALT 45*   AST 40   BILITOT 0.6     Lab Results   Component Value Date    WBC 8.95 2018    HGB 6.8 (L) 2018    HCT 21.5 (L) 2018    MCV 98 2018     (L) 2018     No results for input(s): TSH, FREET4 in the last 168 hours.  Lab Results   Component Value Date    HGBA1C 5.4 2018       Nutritional status:   Body mass index is 24.1 kg/m².  Lab Results   Component Value Date    ALBUMIN 2.5 (L) 2018    ALBUMIN 2.5 (L) 2018    ALBUMIN 2.5 (L) 2018     Lab Results   Component Value Date    PREALBUMIN 12 (L) 2018       Estimated Creatinine Clearance: 46.3 mL/min (A) (based on SCr of 1.6 mg/dL (H)).    Accu-Checks  Recent Labs      18   0005  18   0424  18   0853  18   1201  18   1622  18   2007  18   2358  18   0359  18   0750  18   1143   POCTGLUCOSE  191*  180*  164*  170*  166*  157*  143*  182*  137*  144*       Hyperglycemia/Diabetes Medications: Antihyperglycemics     Start     Stop Route Frequency Ordered    18 0400  insulin aspart U-100 pen 5 Units      -- SubQ Every 24 hours (non-standard times) 18 0713    18 0000  insulin aspart U-100 pen 5 Units   "    -- SubQ Every 24 hours (non-standard times) 06/02/18 0713    06/02/18 2000  insulin aspart U-100 pen 5 Units      -- SubQ Every 24 hours (non-standard times) 06/02/18 0713    06/02/18 1600  insulin aspart U-100 pen 5 Units      -- SubQ Every 24 hours (non-standard times) 06/02/18 0713    06/02/18 1200  insulin aspart U-100 pen 5 Units      -- SubQ Every 24 hours (non-standard times) 06/02/18 0713    06/02/18 0800  insulin aspart U-100 pen 5 Units      -- SubQ Every 24 hours (non-standard times) 06/02/18 0713    05/30/18 1703  insulin aspart U-100 pen 0-5 Units      -- SubQ Every 4 hours PRN 05/30/18 170

## 2018-06-04 NOTE — PROGRESS NOTES
"[facility]  Endocrinology  Progress Note    Admit Date: 2018     Reason for Consult: Management of  Hyperglycemia     Surgical Procedure and Date: s/p CABG x 1, MVR  18    HPI: Patient is a 67 y.o. male with a diagnosis of mitral valve endocarditis and resultant severe MR, TR and pulmonary HTN. The etiology of his endocarditis remains unknown. He was admitted to the SICU pre-operatively for IABP placement 18. Underwent cardiac surgery 18.   No personal or family hx of DM  Lab Results   Component Value Date    HGBA1C 5.4 2018     Endocrine consulted for BG mgmt.             Interval HPI:   Overnight events: remains in ICU, IVF with D 5 1/2 at 50cc/hr, aware creatinine 1.6  BG well controlled on Novolog SQ 5 units q4hr with TF  Eatin% of puree diet  Nausea: No  Hypoglycemia and intervention: No  Fever: No  TPN and/or TF: Yes  If yes, type of TF/TPN and rate: Impact Peptide 50cc/hr continuous    BP (!) 123/58 (BP Location: Left arm, Patient Position: Sitting)   Pulse 83   Temp 98.5 °F (36.9 °C) (Oral)   Resp 20   Ht 5' 10" (1.778 m)   Wt 76.2 kg (167 lb 15.9 oz)   SpO2 100%   BMI 24.10 kg/m²       Labs Reviewed and Include      Recent Labs  Lab 18  0406   *   CALCIUM 9.7   ALBUMIN 2.5*   PROT 5.8*   *   K 4.1   CO2 28   *   BUN 84*   CREATININE 1.6*   ALKPHOS 106   ALT 45*   AST 40   BILITOT 0.6     Lab Results   Component Value Date    WBC 8.95 2018    HGB 6.8 (L) 2018    HCT 21.5 (L) 2018    MCV 98 2018     (L) 2018     No results for input(s): TSH, FREET4 in the last 168 hours.  Lab Results   Component Value Date    HGBA1C 5.4 2018       Nutritional status:   Body mass index is 24.1 kg/m².  Lab Results   Component Value Date    ALBUMIN 2.5 (L) 2018    ALBUMIN 2.5 (L) 2018    ALBUMIN 2.5 (L) 2018     Lab Results   Component Value Date    PREALBUMIN 12 (L) 2018 "       Estimated Creatinine Clearance: 46.3 mL/min (A) (based on SCr of 1.6 mg/dL (H)).    Accu-Checks  Recent Labs      06/03/18   0005  06/03/18   0424  06/03/18   0853  06/03/18   1201  06/03/18   1622  06/03/18   2007  06/03/18   2358  06/04/18   0359  06/04/18   0750  06/04/18   1143   POCTGLUCOSE  191*  180*  164*  170*  166*  157*  143*  182*  137*  144*       Hyperglycemia/Diabetes Medications: Antihyperglycemics     Start     Stop Route Frequency Ordered    06/03/18 0400  insulin aspart U-100 pen 5 Units      -- SubQ Every 24 hours (non-standard times) 06/02/18 0713    06/03/18 0000  insulin aspart U-100 pen 5 Units      -- SubQ Every 24 hours (non-standard times) 06/02/18 0713    06/02/18 2000  insulin aspart U-100 pen 5 Units      -- SubQ Every 24 hours (non-standard times) 06/02/18 0713    06/02/18 1600  insulin aspart U-100 pen 5 Units      -- SubQ Every 24 hours (non-standard times) 06/02/18 0713    06/02/18 1200  insulin aspart U-100 pen 5 Units      -- SubQ Every 24 hours (non-standard times) 06/02/18 0713    06/02/18 0800  insulin aspart U-100 pen 5 Units      -- SubQ Every 24 hours (non-standard times) 06/02/18 0713    05/30/18 1703  insulin aspart U-100 pen 0-5 Units      -- SubQ Every 4 hours PRN 05/30/18 1705          ASSESSMENT and PLAN    * S/P MVR (mitral valve replacement)    Per CTS  avoid hypoglycemia          Acute hyperglycemia    BG goal 140-180 mg/dl  Continue Novolog 5 units Q4hr with TF  Low dose correction scale  Hold if TF is on hold    Please page endocrine NP on call with any BG related issues or concerns.     ADDENDUM: TPN 60cc/hr to start tonight; avoid IV insulin infusion protocol given po diet. Will assess needs in AM.       Discharge plans-TBD             Chronic systolic congestive heart failure    Per CTS  avoid hypoglycemia            Coronary artery disease of native artery of native heart with stable angina pectoris    Per CTS  avoid hypoglycemia          S/P CABG x 1     Per CTS  avoid hypoglycemia          ANNA (acute kidney injury)    Estimated Creatinine Clearance: 46.3 mL/min (A) (based on SCr of 1.6 mg/dL (H)).  avoid hypoglycemia  Caution with insulin stacking            On enteral nutrition    Impact Peptide at 50cc/hr, tolerating at goal  Elevating BG readings              FREYA Ring,ANP-C  Endocrinology  [facility]

## 2018-06-04 NOTE — PROGRESS NOTES
Pt taken to X-ray via stretcher connected to portable CM on room air; sats 100%. VSS. Family in waiting room and updated. Will continue to monitor patient closely.

## 2018-06-04 NOTE — ASSESSMENT & PLAN NOTE
--Continue ASA, statin  --Sternal precautions  --PT/OT  --Ambulate x4   --Wean O2 as tolerated for O2 sat >92%  --monitor CXR    --Monitor electrolytes and replace prn  --hold Lasix  --D5W @50  -- Q6  --echo completed 5/30 to r/o tamponade- negative    --d/c darya   --strict I&Os        DISPO: continue ICU care

## 2018-06-04 NOTE — PLAN OF CARE
Problem: SLP Goal  Goal: SLP Goal  Speech Language Pathology Goals  Goals expected to be met by 6/11  1. Pt will tolerate puree & honey thickened liquids for pleasure while completing 2-3 swallows per bolus w/o overt S/S aspiration, MIN A   3. Educate Pt and family on safe swallow strategies and S/S aspiration   4. Continue to assess swallow to determine feasibility of PO upgrade   5. Pt will complete dysphagia exercises given min A      Speech Language Pathology Goals  Goals expected to be met by 6/7:  1. Pt will tolerate trials of puree w/o overt S/S aspiration, MIN A - CONTINUE  2. Pt will tolerate trials of nectar-thickened liquids w/o overt S/S aspiration, MIN A - DISCONTINUE  3. Educate Pt and family on safe swallow strategies and S/S aspiration - CONTINUE  4. Continue to assess swallow to determine feasibility of PO upgrade - CONTINUE      Goals expected to be met by 6/4: goals not met 2/2 transfer to ICU 5/30  1. Pt will participate in ongoing swallowing assessment to determine safest and least restrictive diet.         Outcome: Ongoing (interventions implemented as appropriate)  Modified Barium Swallow Study completed. See note for details.     Virginia Mccall MS, CCC-SLP  Speech Language Pathologist  Pager: [phone]  6/4/2018

## 2018-06-04 NOTE — PROGRESS NOTES
Dr. Hawkins notified /60 (87). MD changed order for Hydralazine IV for MAP >90. Will carry out order and continue to monitor patient closely.

## 2018-06-04 NOTE — ASSESSMENT & PLAN NOTE
Estimated Creatinine Clearance: 46.3 mL/min (A) (based on SCr of 1.6 mg/dL (H)).  avoid hypoglycemia  Caution with insulin stacking

## 2018-06-05 PROBLEM — E68 SEQUELAE OF HYPERALIMENTATION: Status: ACTIVE | Noted: 2018-06-05

## 2018-06-05 LAB
ALBUMIN SERPL BCP-MCNC: 2.3 G/DL
ALP SERPL-CCNC: 94 U/L
ALT SERPL W/O P-5'-P-CCNC: 38 U/L
ANION GAP SERPL CALC-SCNC: 8 MMOL/L
ANISOCYTOSIS BLD QL SMEAR: SLIGHT
APTT BLDCRRT: 39.5 SEC
AST SERPL-CCNC: 31 U/L
BASOPHILS # BLD AUTO: 0.01 K/UL
BASOPHILS # BLD AUTO: 0.02 K/UL
BASOPHILS NFR BLD: 0.1 %
BASOPHILS NFR BLD: 0.1 %
BILIRUB SERPL-MCNC: 0.4 MG/DL
BLD PROD TYP BPU: NORMAL
BLD PROD TYP BPU: NORMAL
BLOOD UNIT EXPIRATION DATE: NORMAL
BLOOD UNIT EXPIRATION DATE: NORMAL
BLOOD UNIT TYPE CODE: 6200
BLOOD UNIT TYPE CODE: 6200
BLOOD UNIT TYPE: NORMAL
BLOOD UNIT TYPE: NORMAL
BUN SERPL-MCNC: 75 MG/DL
CALCIUM SERPL-MCNC: 8.9 MG/DL
CHLORIDE SERPL-SCNC: 115 MMOL/L
CO2 SERPL-SCNC: 27 MMOL/L
CODING SYSTEM: NORMAL
CODING SYSTEM: NORMAL
CREAT SERPL-MCNC: 1.6 MG/DL
DIFFERENTIAL METHOD: ABNORMAL
DIFFERENTIAL METHOD: ABNORMAL
DISPENSE STATUS: NORMAL
DISPENSE STATUS: NORMAL
EOSINOPHIL # BLD AUTO: 0.4 K/UL
EOSINOPHIL # BLD AUTO: 0.4 K/UL
EOSINOPHIL NFR BLD: 2.1 %
EOSINOPHIL NFR BLD: 5.4 %
ERYTHROCYTE [DISTWIDTH] IN BLOOD BY AUTOMATED COUNT: 16.4 %
ERYTHROCYTE [DISTWIDTH] IN BLOOD BY AUTOMATED COUNT: 16.5 %
EST. GFR  (AFRICAN AMERICAN): 50.8 ML/MIN/1.73 M^2
EST. GFR  (NON AFRICAN AMERICAN): 43.9 ML/MIN/1.73 M^2
GLUCOSE SERPL-MCNC: 182 MG/DL
HCT VFR BLD AUTO: 19 %
HCT VFR BLD AUTO: 22.9 %
HGB BLD-MCNC: 5.8 G/DL
HGB BLD-MCNC: 7.2 G/DL
HYPOCHROMIA BLD QL SMEAR: ABNORMAL
IMM GRANULOCYTES # BLD AUTO: 0.07 K/UL
IMM GRANULOCYTES # BLD AUTO: 0.15 K/UL
IMM GRANULOCYTES NFR BLD AUTO: 0.9 %
IMM GRANULOCYTES NFR BLD AUTO: 0.9 %
INR PPP: 2.9
LYMPHOCYTES # BLD AUTO: 0.6 K/UL
LYMPHOCYTES # BLD AUTO: 0.7 K/UL
LYMPHOCYTES NFR BLD: 4.2 %
LYMPHOCYTES NFR BLD: 7.6 %
MAGNESIUM SERPL-MCNC: 2.3 MG/DL
MAGNESIUM SERPL-MCNC: 2.7 MG/DL
MAGNESIUM SERPL-MCNC: 2.8 MG/DL
MCH RBC QN AUTO: 30.2 PG
MCH RBC QN AUTO: 30.6 PG
MCHC RBC AUTO-ENTMCNC: 30.5 G/DL
MCHC RBC AUTO-ENTMCNC: 31.4 G/DL
MCV RBC AUTO: 97 FL
MCV RBC AUTO: 99 FL
MONOCYTES # BLD AUTO: 0.5 K/UL
MONOCYTES # BLD AUTO: 0.8 K/UL
MONOCYTES NFR BLD: 4.5 %
MONOCYTES NFR BLD: 5.8 %
NEUTROPHILS # BLD AUTO: 15 K/UL
NEUTROPHILS # BLD AUTO: 6.5 K/UL
NEUTROPHILS NFR BLD: 80.2 %
NEUTROPHILS NFR BLD: 88.2 %
NRBC BLD-RTO: 0 /100 WBC
NRBC BLD-RTO: 0 /100 WBC
OVALOCYTES BLD QL SMEAR: ABNORMAL
PHOSPHATE SERPL-MCNC: 3.8 MG/DL
PLATELET # BLD AUTO: 138 K/UL
PLATELET # BLD AUTO: 187 K/UL
PLATELET BLD QL SMEAR: ABNORMAL
PMV BLD AUTO: 12.4 FL
PMV BLD AUTO: 12.9 FL
POCT GLUCOSE: 202 MG/DL (ref 70–110)
POCT GLUCOSE: 233 MG/DL (ref 70–110)
POCT GLUCOSE: 235 MG/DL (ref 70–110)
POCT GLUCOSE: 238 MG/DL (ref 70–110)
POCT GLUCOSE: 250 MG/DL (ref 70–110)
POIKILOCYTOSIS BLD QL SMEAR: SLIGHT
POLYCHROMASIA BLD QL SMEAR: ABNORMAL
POTASSIUM SERPL-SCNC: 3.7 MMOL/L
PROT SERPL-MCNC: 5.5 G/DL
PROTHROMBIN TIME: 28.4 SEC
RBC # BLD AUTO: 1.92 M/UL
RBC # BLD AUTO: 2.35 M/UL
SODIUM SERPL-SCNC: 150 MMOL/L
TRANS ERYTHROCYTES VOL PATIENT: NORMAL ML
TRANS ERYTHROCYTES VOL PATIENT: NORMAL ML
WBC # BLD AUTO: 16.95 K/UL
WBC # BLD AUTO: 8.11 K/UL

## 2018-06-05 PROCEDURE — 85025 COMPLETE CBC W/AUTO DIFF WBC: CPT

## 2018-06-05 PROCEDURE — 25000003 PHARM REV CODE 250: Performed by: STUDENT IN AN ORGANIZED HEALTH CARE EDUCATION/TRAINING PROGRAM

## 2018-06-05 PROCEDURE — 94799 UNLISTED PULMONARY SVC/PX: CPT

## 2018-06-05 PROCEDURE — 36415 COLL VENOUS BLD VENIPUNCTURE: CPT

## 2018-06-05 PROCEDURE — P9021 RED BLOOD CELLS UNIT: HCPCS

## 2018-06-05 PROCEDURE — 85610 PROTHROMBIN TIME: CPT

## 2018-06-05 PROCEDURE — S0028 INJECTION, FAMOTIDINE, 20 MG: HCPCS | Performed by: STUDENT IN AN ORGANIZED HEALTH CARE EDUCATION/TRAINING PROGRAM

## 2018-06-05 PROCEDURE — 97116 GAIT TRAINING THERAPY: CPT

## 2018-06-05 PROCEDURE — 36430 TRANSFUSION BLD/BLD COMPNT: CPT

## 2018-06-05 PROCEDURE — 25000003 PHARM REV CODE 250: Performed by: NURSE PRACTITIONER

## 2018-06-05 PROCEDURE — B4185 PARENTERAL SOL 10 GM LIPIDS: HCPCS | Performed by: STUDENT IN AN ORGANIZED HEALTH CARE EDUCATION/TRAINING PROGRAM

## 2018-06-05 PROCEDURE — A4216 STERILE WATER/SALINE, 10 ML: HCPCS | Performed by: THORACIC SURGERY (CARDIOTHORACIC VASCULAR SURGERY)

## 2018-06-05 PROCEDURE — 63600175 PHARM REV CODE 636 W HCPCS: Performed by: PHYSICIAN ASSISTANT

## 2018-06-05 PROCEDURE — 85730 THROMBOPLASTIN TIME PARTIAL: CPT

## 2018-06-05 PROCEDURE — 63600175 PHARM REV CODE 636 W HCPCS: Performed by: STUDENT IN AN ORGANIZED HEALTH CARE EDUCATION/TRAINING PROGRAM

## 2018-06-05 PROCEDURE — 25000003 PHARM REV CODE 250: Performed by: THORACIC SURGERY (CARDIOTHORACIC VASCULAR SURGERY)

## 2018-06-05 PROCEDURE — 99232 SBSQ HOSP IP/OBS MODERATE 35: CPT | Mod: GC,,, | Performed by: ANESTHESIOLOGY

## 2018-06-05 PROCEDURE — 25000003 PHARM REV CODE 250: Performed by: PHYSICIAN ASSISTANT

## 2018-06-05 PROCEDURE — 97535 SELF CARE MNGMENT TRAINING: CPT

## 2018-06-05 PROCEDURE — 94761 N-INVAS EAR/PLS OXIMETRY MLT: CPT

## 2018-06-05 PROCEDURE — 84100 ASSAY OF PHOSPHORUS: CPT

## 2018-06-05 PROCEDURE — 99232 SBSQ HOSP IP/OBS MODERATE 35: CPT | Mod: ,,, | Performed by: NURSE PRACTITIONER

## 2018-06-05 PROCEDURE — 92526 ORAL FUNCTION THERAPY: CPT

## 2018-06-05 PROCEDURE — 20000000 HC ICU ROOM

## 2018-06-05 PROCEDURE — A4217 STERILE WATER/SALINE, 500 ML: HCPCS | Performed by: STUDENT IN AN ORGANIZED HEALTH CARE EDUCATION/TRAINING PROGRAM

## 2018-06-05 PROCEDURE — 80053 COMPREHEN METABOLIC PANEL: CPT

## 2018-06-05 PROCEDURE — 97110 THERAPEUTIC EXERCISES: CPT

## 2018-06-05 PROCEDURE — 83735 ASSAY OF MAGNESIUM: CPT | Mod: 91

## 2018-06-05 PROCEDURE — 83735 ASSAY OF MAGNESIUM: CPT

## 2018-06-05 RX ORDER — INSULIN ASPART 100 [IU]/ML
6 INJECTION, SOLUTION INTRAVENOUS; SUBCUTANEOUS
Status: DISCONTINUED | OUTPATIENT
Start: 2018-06-05 | End: 2018-06-08

## 2018-06-05 RX ORDER — INSULIN ASPART 100 [IU]/ML
6 INJECTION, SOLUTION INTRAVENOUS; SUBCUTANEOUS
Status: DISCONTINUED | OUTPATIENT
Start: 2018-06-06 | End: 2018-06-08

## 2018-06-05 RX ORDER — FAMOTIDINE 10 MG/ML
20 INJECTION INTRAVENOUS 2 TIMES DAILY
Status: DISCONTINUED | OUTPATIENT
Start: 2018-06-05 | End: 2018-06-07

## 2018-06-05 RX ORDER — HYDRALAZINE HYDROCHLORIDE 25 MG/1
25 TABLET, FILM COATED ORAL EVERY 8 HOURS
Status: DISCONTINUED | OUTPATIENT
Start: 2018-06-05 | End: 2018-06-08

## 2018-06-05 RX ORDER — HYDROCODONE BITARTRATE AND ACETAMINOPHEN 500; 5 MG/1; MG/1
TABLET ORAL
Status: DISCONTINUED | OUTPATIENT
Start: 2018-06-05 | End: 2018-06-09

## 2018-06-05 RX ORDER — HYDRALAZINE HYDROCHLORIDE 20 MG/ML
10 INJECTION INTRAMUSCULAR; INTRAVENOUS ONCE
Status: COMPLETED | OUTPATIENT
Start: 2018-06-05 | End: 2018-06-05

## 2018-06-05 RX ADMIN — AMPICILLIN 2 G: 2 INJECTION, POWDER, FOR SOLUTION INTRAVENOUS at 05:06

## 2018-06-05 RX ADMIN — HYDRALAZINE HYDROCHLORIDE 10 MG: 20 INJECTION INTRAMUSCULAR; INTRAVENOUS at 06:06

## 2018-06-05 RX ADMIN — ASPIRIN 81 MG 324 MG: 81 TABLET ORAL at 08:06

## 2018-06-05 RX ADMIN — INSULIN ASPART 1 UNITS: 100 INJECTION, SOLUTION INTRAVENOUS; SUBCUTANEOUS at 03:06

## 2018-06-05 RX ADMIN — FAMOTIDINE 20 MG: 10 INJECTION INTRAVENOUS at 09:06

## 2018-06-05 RX ADMIN — POLYETHYLENE GLYCOL 3350 17 G: 17 POWDER, FOR SOLUTION ORAL at 08:06

## 2018-06-05 RX ADMIN — HYDRALAZINE HYDROCHLORIDE 20 MG: 20 INJECTION INTRAMUSCULAR; INTRAVENOUS at 03:06

## 2018-06-05 RX ADMIN — DOXAZOSIN MESYLATE 4 MG: 2 TABLET ORAL at 08:06

## 2018-06-05 RX ADMIN — INSULIN ASPART 2 UNITS: 100 INJECTION, SOLUTION INTRAVENOUS; SUBCUTANEOUS at 08:06

## 2018-06-05 RX ADMIN — MIRTAZAPINE 7.5 MG: 7.5 TABLET ORAL at 09:06

## 2018-06-05 RX ADMIN — HYDRALAZINE HYDROCHLORIDE 25 MG: 25 TABLET, FILM COATED ORAL at 06:06

## 2018-06-05 RX ADMIN — CEFTRIAXONE SODIUM 2 G: 2 INJECTION, POWDER, FOR SOLUTION INTRAMUSCULAR; INTRAVENOUS at 04:06

## 2018-06-05 RX ADMIN — HYDRALAZINE HYDROCHLORIDE 25 MG: 25 TABLET, FILM COATED ORAL at 02:06

## 2018-06-05 RX ADMIN — DEXTROSE: 5 SOLUTION INTRAVENOUS at 09:06

## 2018-06-05 RX ADMIN — INSULIN ASPART 6 UNITS: 100 INJECTION, SOLUTION INTRAVENOUS; SUBCUTANEOUS at 04:06

## 2018-06-05 RX ADMIN — FAMOTIDINE 20 MG: 10 INJECTION INTRAVENOUS at 08:06

## 2018-06-05 RX ADMIN — Medication 10 ML: at 12:06

## 2018-06-05 RX ADMIN — ATORVASTATIN CALCIUM 40 MG: 20 TABLET, FILM COATED ORAL at 08:06

## 2018-06-05 RX ADMIN — INSULIN ASPART 2 UNITS: 100 INJECTION, SOLUTION INTRAVENOUS; SUBCUTANEOUS at 12:06

## 2018-06-05 RX ADMIN — AMLODIPINE BESYLATE 10 MG: 10 TABLET ORAL at 08:06

## 2018-06-05 RX ADMIN — INSULIN ASPART 5 UNITS: 100 INJECTION, SOLUTION INTRAVENOUS; SUBCUTANEOUS at 03:06

## 2018-06-05 RX ADMIN — INSULIN ASPART 6 UNITS: 100 INJECTION, SOLUTION INTRAVENOUS; SUBCUTANEOUS at 08:06

## 2018-06-05 RX ADMIN — INSULIN ASPART 5 UNITS: 100 INJECTION, SOLUTION INTRAVENOUS; SUBCUTANEOUS at 07:06

## 2018-06-05 RX ADMIN — HYDRALAZINE HYDROCHLORIDE 25 MG: 25 TABLET, FILM COATED ORAL at 09:06

## 2018-06-05 RX ADMIN — SOYBEAN OIL 250 ML: 20 INJECTION, SOLUTION INTRAVENOUS at 09:06

## 2018-06-05 RX ADMIN — HYDRALAZINE HYDROCHLORIDE 20 MG: 20 INJECTION INTRAMUSCULAR; INTRAVENOUS at 10:06

## 2018-06-05 RX ADMIN — AMPICILLIN 2 G: 2 INJECTION, POWDER, FOR SOLUTION INTRAVENOUS at 03:06

## 2018-06-05 RX ADMIN — INSULIN ASPART 2 UNITS: 100 INJECTION, SOLUTION INTRAVENOUS; SUBCUTANEOUS at 04:06

## 2018-06-05 RX ADMIN — Medication 3 ML: at 10:06

## 2018-06-05 RX ADMIN — INSULIN ASPART 5 UNITS: 100 INJECTION, SOLUTION INTRAVENOUS; SUBCUTANEOUS at 12:06

## 2018-06-05 RX ADMIN — AMPICILLIN 2 G: 2 INJECTION, POWDER, FOR SOLUTION INTRAVENOUS at 09:06

## 2018-06-05 RX ADMIN — INSULIN ASPART 6 UNITS: 100 INJECTION, SOLUTION INTRAVENOUS; SUBCUTANEOUS at 12:06

## 2018-06-05 RX ADMIN — CEFTRIAXONE SODIUM 2 G: 2 INJECTION, POWDER, FOR SOLUTION INTRAMUSCULAR; INTRAVENOUS at 03:06

## 2018-06-05 RX ADMIN — RAMELTEON 8 MG: 8 TABLET, FILM COATED ORAL at 09:06

## 2018-06-05 RX ADMIN — AMPICILLIN 2 G: 2 INJECTION, POWDER, FOR SOLUTION INTRAVENOUS at 10:06

## 2018-06-05 RX ADMIN — CALCIUM GLUCONATE: 94 INJECTION, SOLUTION INTRAVENOUS at 09:06

## 2018-06-05 NOTE — PROGRESS NOTES
[facility]  Cardiothoracic Surgery  Progress Note    Patient Name: Rodolfo Messina  MRN: [id number]  Admission Date: 5/23/2018  Hospital Length of Stay: 13 days  Code Status: Full Code   Attending Physician: Marvin Go MD   Referring Provider: Marvin Go MD  Principal Problem:S/P MVR (mitral valve replacement)    Subjective:     Post-Op Info:  Procedure(s) (LRB):  Mitral Valve Replacement (N/A)  AORTOCORONARY BYPASS-CABG (N/A)   11 Days Post-Op     Interval History: No acute events overnight. Slept better overnight. Doing well this am.    Medications:  Continuous Infusions:   dextrose 5 % 50 mL/hr at 06/05/18 1505    TPN ADULT CENTRAL LINE CUSTOM 60 mL/hr at 06/05/18 1505    TPN ADULT CENTRAL LINE CUSTOM       Scheduled Meds:   amLODIPine  10 mg Per NG tube Daily    ampicillin IVPB  2 g Intravenous Q6H    aspirin  324 mg Per NG tube Daily    atorvastatin  40 mg Per NG tube Daily    cefTRIAXone (ROCEPHIN) IVPB  2 g Intravenous Q12H    doxazosin  4 mg Per NG tube Daily    famotidine (PF)  20 mg Intravenous BID    fat emulsion 20%  250 mL Intravenous Daily    hydrALAZINE  25 mg Oral Q8H    [START ON 6/6/2018] insulin aspart U-100  6 Units Subcutaneous Q24H    [START ON 6/6/2018] insulin aspart U-100  6 Units Subcutaneous Q24H    [START ON 6/6/2018] insulin aspart U-100  6 Units Subcutaneous Q24H    insulin aspart U-100  6 Units Subcutaneous Q24H    insulin aspart U-100  6 Units Subcutaneous Q24H    insulin aspart U-100  6 Units Subcutaneous Q24H    mirtazapine  7.5 mg Per NG tube QHS    polyethylene glycol  17 g Per NG tube Daily    ramelteon  8 mg Oral QHS    sodium chloride 0.9%  10 mL Intravenous Q6H    sodium chloride 0.9%  3 mL Intravenous Q8H     PRN Meds:sodium chloride, albumin human 5%, albuterol-ipratropium, bisacodyl, dextrose 50%, glucagon (human recombinant), hydrALAZINE, insulin aspart U-100, lactated ringers, metoclopramide HCl, ondansetron, oxyCODONE,  Flushing PICC Protocol **AND** sodium chloride 0.9% **AND** sodium chloride 0.9%, white petrolatum-mineral oil     Objective:     Vital Signs (Most Recent):  Temp: 97.7 °F (36.5 °C) (06/05/18 1500)  Pulse: 78 (06/05/18 1500)  Resp: 19 (06/05/18 1500)  BP: 136/63 (06/05/18 1500)  SpO2: 100 % (06/05/18 1500) Vital Signs (24h Range):  Temp:  [97.7 °F (36.5 °C)-98.6 °F (37 °C)] 97.7 °F (36.5 °C)  Pulse:  [] 78  Resp:  [14-49] 19  SpO2:  [95 %-100 %] 100 %  BP: (111-192)/(53-81) 136/63     Weight: 76.2 kg (167 lb 15.9 oz)  Body mass index is 24.1 kg/m².    SpO2: 100 %  O2 Device (Oxygen Therapy): room air    Intake/Output - Last 3 Shifts       06/03 0700 - 06/04 0659 06/04 0700 - 06/05 0659 06/05 0700 - 06/06 0659    I.V. (mL/kg) 1480.5 (19.4) 627 (8.2)     Blood  222     NG/GT 2140 200     TPN  669     Total Intake(mL/kg) 3620.5 (47.5) 1718 (22.5)     Urine (mL/kg/hr) 2070 (1.1) 2165 (1.2) 665 (1)    Total Output 2070 2165 665    Net +1550.5 -447 -665                 Lines/Drains/Airways     Peripherally Inserted Central Catheter Line                 PICC Double Lumen 03/05/18 1539 right basilic 91 days         PICC Double Lumen 06/04/18 1654 left brachial less than 1 day          Central Venous Catheter Line                 Percutaneous Central Line Insertion/Assessment - Quad lumen  05/25/18 0800 left subclavian 11 days          Drain                 Urethral Catheter 05/30/18 0445 Straight-tip 16 Fr. 6 days          Line                 Pacer Wires 05/25/18 1355 11 days                Physical Exam   Constitutional: He appears well-developed.   Cardiovascular: Normal rate.    Incision c/d/i   Pulmonary/Chest: Effort normal.   Neurological: He is alert.   Skin: Skin is warm and dry.       Significant Labs:  CBC:   Recent Labs  Lab 06/05/18  1013   WBC 16.95*   RBC 2.35*   HGB 7.2*   HCT 22.9*      MCV 97   MCH 30.6   MCHC 31.4*     CMP:   Recent Labs  Lab 06/05/18  0300   *   CALCIUM 8.9   ALBUMIN  2.3*   PROT 5.5*   *   K 3.7   CO2 27   *   BUN 75*   CREATININE 1.6*   ALKPHOS 94   ALT 38   AST 31   BILITOT 0.4     Coagulation:   Recent Labs  Lab 06/05/18  0300   INR 2.9*   APTT 39.5*     Assessment/Plan:     * S/P MVR (mitral valve replacement)    --Continue ASA, statin  --Sternal precautions  --PT/OT  --Ambulate x4   --Wean O2 as tolerated for O2 sat >92%  --monitor CXR    --Monitor electrolytes and replace prn  --hold Lasix  --D5W @50  --echo completed 5/30 to r/o tamponade- negative    --d/c lackey   --strict I&Os  --did not pass modified swallow; PICC and TPN  --consult IR for G-J tube placement; INR needs to be 1.5        DISPO: stepdown          Epistaxis    --Noted to have post traumatic left sided epistaxis following attempted NGT placement. On the time of evaluation patient was hemostatic. No active bleeding noted.     -Given that patient is hemostatic, no acute intervention   -Recommend minimize nasal trauma as best possible  -If requires oxygen, ideal if it is humidified via face tent and not nasal canula  -Bacitracin to bilateral nares nightly  -Afrin q6hrs PRN bleeding (only if ok from Cards prospective)  -Ocean nasal spray q6hrs while awake (starting in a couple days, allow stable clot to form for now)  -If bleeding restarts, hold pressure to fleshy portion of nose (10 mins) and lean forward  -For bleeding that does not stop, page resident  -Remainder of medical management per primary team        Atrial flutter    --EP following appreciate recs.   --holding BB  --external pacer set at 80        Chronic systolic congestive heart failure    - ASA, statin  - D/c beta blocker  - EP following; appreciate recs        ANNA (acute kidney injury)    --Cr elevated but stable. Holding all diuretics   --CMP q6h             Vishnu Hawkins MD  Cardiothoracic Surgery  [facility]

## 2018-06-05 NOTE — PLAN OF CARE
Problem: Patient Care Overview  Goal: Plan of Care Review  Outcome: Ongoing (interventions implemented as appropriate)  Reviewed plan of care with patient and spouse. Pt AAOx4, moves all extremities and follows commands. Room air. TPN and D5 infusing per order. Pt denies pain. Turns and repositions self, free from skin breakdown. OOB to Chair for most of day. Transfer orders to step down, awaiting room assignment. See flow sheet for further assessment. All questions and concerns answered and acknowledged. VSS at this time, will continue to monitor.

## 2018-06-05 NOTE — ASSESSMENT & PLAN NOTE
--Continue ASA, statin  --Sternal precautions  --PT/OT  --Ambulate x4   --Wean O2 as tolerated for O2 sat >92%  --monitor CXR    --Monitor electrolytes and replace prn  --hold Lasix  --D5W @50  -- Q6  --echo completed 5/30 to r/o tamponade- negative    --d/c darya   --strict I&Os  --did not pass modified swallow; PICC and TPN  --consult IR for G-J tube placement; INR needs to be 1.5        DISPO: stepdown

## 2018-06-05 NOTE — PT/OT/SLP PROGRESS
"Speech Language Pathology Treatment    Patient Name:  Rodolfo Messina   MRN:  [id number]  Admitting Diagnosis: S/P MVR (mitral valve replacement)    Recommendations:                 General Recommendations:  Dysphagia therapy  Diet recommendations:  NPO, Liquid Diet Level: NPO   · Puree & honey thick liquids ok for pleasure with 2-3 swallows per bolus, strict aspiration precautions listed below & discontinue PO if ANY s/s aspiration (coughing, throat clearing, wet vocal quality)   · 1 bite/sip at a time,   · 1:1 Assistance with PO to ensure monitoring of s/s aspiration,   · Assistance with thickening liquids,   · 3 swallows with each bite/sip,   · Feed only when awake/alert,   · HOB to 90 degrees,   · ok for Meds crushed in puree,   · Monitor for s/s of aspiration,   · No straws,  · Small bites/sips  Aspiration Precautions: Frequent oral care and Strict aspiration precautions   General Precautions: Standard, fall, aspiration  Communication strategies:  none    Subjective     Patient awake and cooperative. Swallowing exercises noted at bedside.   Patient reports likely getting a PEG tube.  Patient states, "When do you think I'll be able to have water again?"    Objective:     Has the patient been evaluated by SLP for swallowing?   Yes  Keep patient NPO? Yes   Current Respiratory Status: room air      Chin Tuck Against Resistance (CTAR) exercise introduced this session. Patient sustained pressure for 30 seconds and completed 30 repetitions with SLP. Handout provided listing instructions for independent CTAR completion. SLP encouraged patient to complete a minimum of 3x daily. SLP encouraged patient to increase time held and repetitions each day.     Pudding by 1/2 tsp bites x3 utilized to facilitate swallows for direct dysphagia therapy exercises. Patient encouraged to complete 4 effortful swallows per bite. Patient with noted throat clearing and coughing with each trial, so trials were discontinued. Mr. Messina reported " "pudding feeling thicker and harder to swallow than apple sauce.     Mr. Messina completed effortful swallows, falsetto exercise, and mendelsohn maneuver. Patient with noted difficulty completing Mendelsohn Maneuver. Patient able to complete exercise by vocalizing "ki---(hold)ck" effectively holding throat in high position. Education provided on the importance of independent completion of all swallowing exercises daily. Patient encouraged to complete effortful swallows a minimum of 100x daily. Patient verbalized understanding of all discussed. All questions addressed. White board updated.    Assessment:     Rodolfo Messina is a 67 y.o. male with an SLP diagnosis of Dysphagia.  ST will continue to follow.     Goals:    SLP Goals        Problem: SLP Goal    Goal Priority Disciplines Outcome   SLP Goal     SLP Ongoing (interventions implemented as appropriate)   Description:  Speech Language Pathology Goals  Goals expected to be met by 6/11  1. Pt will tolerate puree & honey thickened liquids for pleasure while completing 2-3 swallows per bolus w/o overt S/S aspiration, MIN A   3. Educate Pt and family on safe swallow strategies and S/S aspiration   4. Continue to assess swallow to determine feasibility of PO upgrade   5. Pt will complete dysphagia exercises given min A      Speech Language Pathology Goals  Goals expected to be met by 6/7:  1. Pt will tolerate trials of puree w/o overt S/S aspiration, MIN A - CONTINUE  2. Pt will tolerate trials of nectar-thickened liquids w/o overt S/S aspiration, MIN A - DISCONTINUE  3. Educate Pt and family on safe swallow strategies and S/S aspiration - CONTINUE  4. Continue to assess swallow to determine feasibility of PO upgrade - CONTINUE      Goals expected to be met by 6/4: goals not met 2/2 transfer to ICU 5/30  1. Pt will participate in ongoing swallowing assessment to determine safest and least restrictive diet.                           Plan:     · Patient to be seen:  5 " x/week   · Plan of Care expires:  06/30/18  · Plan of Care reviewed with:  patient   · SLP Follow-Up:  Yes       Discharge recommendations:  home health speech therapy       Time Tracking:     SLP Treatment Date:   06/05/18  Speech Start Time:  0829  Speech Stop Time:  0902     Speech Total Time (min):  33 min    Billable Minutes: Treatment Swallowing Dysfunction 23 and Seld Care/Home Management Training 10    IRVIN Obando, CCC-SLP   Pager: [phone]  06/05/2018

## 2018-06-05 NOTE — ASSESSMENT & PLAN NOTE
BG goal 140-180 mg/dl    Increase Novolog 6 units Q4hr with TPN  Continue bg monitoring q4 hours and low dose correction scale  Hold if TPN suspended/discontinued.     Please page endocrine NP on call with any BG related issues or concerns.       Discharge plans-TBD

## 2018-06-05 NOTE — SUBJECTIVE & OBJECTIVE
"Interval HPI:   Overnight events: remains in ICU, awaiting step down bed. TPN started overnight; BG globally elevated. Tentative g-j tube placement; IR consulted per CTS.   Eating:   NPO  Nausea: No  Hypoglycemia and intervention: No  Fever: No  TPN : Yes at 60 cc/hr; will decrease to 50 cc/hr tonight.     BP (!) 121/56 (BP Location: Right arm, Patient Position: Sitting)   Pulse 83   Temp 97.7 °F (36.5 °C) (Oral)   Resp 20   Ht 5' 10" (1.778 m)   Wt 76.2 kg (167 lb 15.9 oz)   SpO2 100%   BMI 24.10 kg/m²     Labs Reviewed and Include      Recent Labs  Lab 06/05/18  0300   *   CALCIUM 8.9   ALBUMIN 2.3*   PROT 5.5*   *   K 3.7   CO2 27   *   BUN 75*   CREATININE 1.6*   ALKPHOS 94   ALT 38   AST 31   BILITOT 0.4     Lab Results   Component Value Date    WBC 8.11 06/05/2018    HGB 5.8 (LL) 06/05/2018    HCT 19.0 (LL) 06/05/2018    MCV 99 (H) 06/05/2018     (L) 06/05/2018     No results for input(s): TSH, FREET4 in the last 168 hours.  Lab Results   Component Value Date    HGBA1C 5.4 02/22/2018       Nutritional status:   Body mass index is 24.1 kg/m².  Lab Results   Component Value Date    ALBUMIN 2.3 (L) 06/05/2018    ALBUMIN 2.5 (L) 06/04/2018    ALBUMIN 2.5 (L) 06/03/2018     Lab Results   Component Value Date    PREALBUMIN 12 (L) 02/22/2018       Estimated Creatinine Clearance: 46.3 mL/min (A) (based on SCr of 1.6 mg/dL (H)).    Accu-Checks  Recent Labs      06/03/18   1622  06/03/18 2007 06/03/18   2358  06/04/18   0359  06/04/18   0750  06/04/18   1143  06/04/18   1613  06/04/18   2102  06/05/18   0313  06/05/18   0747   POCTGLUCOSE  166*  157*  143*  182*  137*  144*  145*  135*  202*  238*       Hyperglycemia/Diabetes Medications: Antihyperglycemics     Start     Stop Route Frequency Ordered    06/03/18 0400  insulin aspart U-100 pen 5 Units      -- SubQ Every 24 hours (non-standard times) 06/02/18 0713    06/03/18 0000  insulin aspart U-100 pen 5 Units      -- SubQ Every 24 " hours (non-standard times) 06/02/18 0713    06/02/18 2000  insulin aspart U-100 pen 5 Units      -- SubQ Every 24 hours (non-standard times) 06/02/18 0713    06/02/18 1600  insulin aspart U-100 pen 5 Units      -- SubQ Every 24 hours (non-standard times) 06/02/18 0713    06/02/18 1200  insulin aspart U-100 pen 5 Units      -- SubQ Every 24 hours (non-standard times) 06/02/18 0713    06/02/18 0800  insulin aspart U-100 pen 5 Units      -- SubQ Every 24 hours (non-standard times) 06/02/18 0713    05/30/18 1703  insulin aspart U-100 pen 0-5 Units      -- SubQ Every 4 hours PRN 05/30/18 1701

## 2018-06-05 NOTE — PLAN OF CARE
Problem: Occupational Therapy Goal  Goal: Occupational Therapy Goal  Goals to be met by: 6/10/18     Patient will increase functional independence with ADLs by performing:    Feeding with Modified [place].  UE Dressing with Supervision.  LE Dressing with Supervision.  Grooming while standing at sink with Supervision.  Toileting from toilet with Supervision for hygiene and clothing management.   Toilet transfer to toilet with Supervision.      Outcome: Ongoing (interventions implemented as appropriate)  The above goals remain appropriate. MILTON Olguin  6/5/2018

## 2018-06-05 NOTE — PROGRESS NOTES
[facility]  Critical Care - Surgery  Progress Note    Patient Name: Rodolfo Messina  MRN: [id number]  Admission Date: 5/23/2018  Hospital Length of Stay: 13 days  Code Status: Full Code  Attending Provider: Marvin Go MD  Primary Care Provider: Deric Claudio MD   Principal Problem: S/P MVR (mitral valve replacement)    Subjective:     Hospital/ICU Course:  No notes on file    Interval History/Significant Events:     Patient underwent barium swallow and was found to hae moderate oropharyngeal dysphagia. He currently lacks the strength/endurance to safely consume adequate nutrition/hydration. Speech therapy recommends continuation of his NPO status and puree/honey thick liquids for pleasure w/ 2-3 swallows per bolus. In addition, he requires strict aspiration precautions. TPN was ordered to start by primary team 2/2 continued delayed cough after swallowing. Patient remains on a D5 IV gtt. He continues to sat at 100% on RA. VSS overnight.    Follow-up For: Procedure(s) (LRB):  Mitral Valve Replacement (N/A)  AORTOCORONARY BYPASS-CABG (N/A)    Post-Operative Day: 11 Days Post-Op    Objective:     Vital Signs (Most Recent):  Temp: 98.4 °F (36.9 °C) (06/05/18 0539)  Pulse: 80 (06/05/18 0530)  Resp: (!) 21 (06/05/18 0530)  BP: (!) 124/59 (06/05/18 0500)  SpO2: 100 % (06/05/18 0530) Vital Signs (24h Range):  Temp:  [98.1 °F (36.7 °C)-98.6 °F (37 °C)] 98.4 °F (36.9 °C)  Pulse:  [] 80  Resp:  [14-49] 21  SpO2:  [95 %-100 %] 100 %  BP: (105-154)/(53-74) 124/59     Weight: 76.2 kg (167 lb 15.9 oz)  Body mass index is 24.1 kg/m².      Intake/Output Summary (Last 24 hours) at 06/05/18 0546  Last data filed at 06/05/18 0539   Gross per 24 hour   Intake          2858.48 ml   Output             2000 ml   Net           858.48 ml       Physical Exam   Constitutional: He is oriented to person, place, and time. He appears well-developed and well-nourished. No distress.   HENT:   Head: Normocephalic and  atraumatic.   NG tube in place   Eyes: Conjunctivae are normal. No scleral icterus.   Cardiovascular: Normal rate.    Murmur heard.  Sternal incision c/d/i. No wound dehiscence. No purulent drainage, surrounding erythema, warmth or induration   Pulmonary/Chest: Effort normal. No respiratory distress.   Abdominal: Soft. He exhibits no distension. There is no tenderness.   Genitourinary:   Genitourinary Comments: Lainez catheter in place with clear yellow urine in bag   Neurological: He is alert and oriented to person, place, and time.   Skin: Skin is warm and dry. No rash noted. He is not diaphoretic.   Psychiatric: He has a normal mood and affect. His behavior is normal.       Vents:  Vent Mode: Spont (05/26/18 2025)  Ventilator Initiated: Yes (05/25/18 1509)  Set Rate: 0 bmp (05/26/18 2025)  Vt Set: 500 mL (05/26/18 2025)  Pressure Support: 10 cmH20 (05/26/18 2025)  PEEP/CPAP: 5 cmH20 (05/26/18 2025)  Oxygen Concentration (%): 24 (05/29/18 0402)  Peak Airway Pressure: 16 cmH2O (05/26/18 2025)  Plateau Pressure: 0 cmH20 (05/26/18 2025)  Total Ve: 11.8 mL (05/26/18 2025)  F/VT Ratio<105 (RSBI): (!) 32.76 (05/26/18 2025)    Lines/Drains/Airways     Peripherally Inserted Central Catheter Line                 PICC Double Lumen 03/05/18 1539 right basilic 91 days         PICC Double Lumen 06/04/18 1654 left brachial less than 1 day          Central Venous Catheter Line                 Percutaneous Central Line Insertion/Assessment - Quad lumen  05/25/18 0800 left subclavian 10 days          Drain                 Urethral Catheter 05/30/18 0445 Straight-tip 16 Fr. 6 days          Line                 Pacer Wires 05/25/18 1355 10 days          Peripheral Intravenous Line                 Midline Catheter Insertion/Assessment  - Single Lumen 05/30/18 1623 Right basilic vein (medial side of arm) 18g x 8cm 5 days                Significant Labs:    CBC/Anemia Profile:    Recent Labs  Lab 06/04/18  0406 06/05/18  0300   WBC 8.95  8.11   HGB 6.8* 5.8*   HCT 21.5* 19.0*   * 138*   MCV 98 99*   RDW 16.6* 16.4*        Chemistries:    Recent Labs  Lab 06/03/18  1635 06/04/18  0406 06/04/18 2005 06/05/18  0300   * 153*  --  150*   K 4.2 4.1  --  3.7   * 115*  --  115*   CO2 28 28  --  27   BUN 90* 84*  --  75*   CREATININE 1.4 1.6*  --  1.6*   CALCIUM 9.5 9.7  --  8.9   ALBUMIN  --  2.5*  --  2.3*   PROT  --  5.8*  --  5.5*   BILITOT  --  0.6  --  0.4   ALKPHOS  --  106  --  94   ALT  --  45*  --  38   AST  --  40  --  31   MG  --  2.5 2.3 2.3   PHOS  --   --   --  3.8     Assessment/Plan:     Chronic systolic congestive heart failure     68yo M with PMH of mitral valve endocarditis and resultant severe MR, TR and pulmonary HTN who is now s/p IABP placement (5/24) and s/pMVR and CABG 5/25.     Plan:     Neuro:   - Mental Status significantly improved since admission to the SICU. Recommend continued delirium precautions.   - AAOx4 this AM.   - Pain control per CTS.     Pulmonary:   - Currently on RA.   - Continuous pulse ox.   - Supplement O2 if necessary to maintain sats at >92.   - ABGs/CXR if distressed.        Cardiac:  - MAP goal >65  - Paced at 80  - Home amlodipine, ASA, and statin daily.  - TTE revealed LA thrombus. Therefore no DCCV at this time. EPS thinks amelia function may return in time. Started theophyline.   - Coumadin therapy.  - Continuous cardiac monitoring  - Pathology from valve returned with active subacute endocarditis. S/p ID consultation. PICC placed for long term abx administration.  Per ID Ampicillin and Ceftriaxone x 6 weeks for Enterococcal SBE (end date 7/13/18). In addition, patient will need ESR, CRP, CBC and CMP to be drawn weekly with results faxed to the ID Department at [phone] set up prior to discharge. Appreciate sw assistance.      Renal:   -UOP adequate.   -Bun/Cr stable.   -Lasix gtt off.   -Flomax daily given home use.  - MIVF.      Fluids/Electrolytes/Nutrition/GI:   - MIVF   - Na  remains elevated currently on free water boluses.  - s/p barium swallow. Recommendations continued NPO status w/ puree/honey tick liquids only for pleasure w/ 2-3 adequate swallows per food bolus if there are no signs or symptoms of aspiration. TPN was started by primary team 2/2 concerns over aspiration and inadequate nutrition on 6/4/18.   - Replace lytes PRN.  - Bowel regimen.     Hematology/Oncology:  - H/H stable. Will continue to monitor.  -INR daily  -CBC daily     Infectious Disease:   -Afebrile  -WBC wnl  -CBC daily   -Pathology from valve with active endocarditis, ID consulted, recommend Ampicillin and Ceftriaxone x 6 weeks for Enterococcal SBE (end date 7/13/18). In addition, patient will need ESR, CRP, CBC and CMP to be drawn weekly with results faxed to the ID Department at [phone] set up prior to discharge. Appreciate sw assistance.      Endocrine:  - No h/o DM or thyroid dysfunction  - s/p Endocrine evaluation. Continue following recs.   - Glucose goal of 120-180     Dispo:  - CTS primary. Likely to step down soon per CTS. Will continue to follow while patient remains in the SICU.                    Critical care was time spent personally by me on the following activities: development of treatment plan with patient or surrogate and bedside caregivers, discussions with consultants, evaluation of patient's response to treatment, examination of patient, ordering and performing treatments and interventions, ordering and review of laboratory studies, ordering and review of radiographic studies, pulse oximetry, re-evaluation of patient's condition.  This critical care time did not overlap with that of any other provider or involve time for any procedures.     Patrick Hawkins MD  Critical Care - Surgery  [facility]

## 2018-06-05 NOTE — PLAN OF CARE
Problem: SLP Goal  Goal: SLP Goal  Speech Language Pathology Goals  Goals expected to be met by 6/11  1. Pt will tolerate puree & honey thickened liquids for pleasure while completing 2-3 swallows per bolus w/o overt S/S aspiration, MIN A   3. Educate Pt and family on safe swallow strategies and S/S aspiration   4. Continue to assess swallow to determine feasibility of PO upgrade   5. Pt will complete dysphagia exercises given min A      Speech Language Pathology Goals  Goals expected to be met by 6/7:  1. Pt will tolerate trials of puree w/o overt S/S aspiration, MIN A - CONTINUE  2. Pt will tolerate trials of nectar-thickened liquids w/o overt S/S aspiration, MIN A - DISCONTINUE  3. Educate Pt and family on safe swallow strategies and S/S aspiration - CONTINUE  4. Continue to assess swallow to determine feasibility of PO upgrade - CONTINUE      Goals expected to be met by 6/4: goals not met 2/2 transfer to ICU 5/30  1. Pt will participate in ongoing swallowing assessment to determine safest and least restrictive diet.          Outcome: Ongoing (interventions implemented as appropriate)  Goals remain appropriate. ST will continue to follow.   CAREY Recinos., CCC-SLP  Pager: [phone]  06/05/2018

## 2018-06-05 NOTE — SUBJECTIVE & OBJECTIVE
Interval History: No acute events overnight. Slept better overnight. Doing well this am.    Medications:  Continuous Infusions:   dextrose 5 % 50 mL/hr at 06/05/18 1505    TPN ADULT CENTRAL LINE CUSTOM 60 mL/hr at 06/05/18 1505    TPN ADULT CENTRAL LINE CUSTOM       Scheduled Meds:   amLODIPine  10 mg Per NG tube Daily    ampicillin IVPB  2 g Intravenous Q6H    aspirin  324 mg Per NG tube Daily    atorvastatin  40 mg Per NG tube Daily    cefTRIAXone (ROCEPHIN) IVPB  2 g Intravenous Q12H    doxazosin  4 mg Per NG tube Daily    famotidine (PF)  20 mg Intravenous BID    fat emulsion 20%  250 mL Intravenous Daily    hydrALAZINE  25 mg Oral Q8H    [START ON 6/6/2018] insulin aspart U-100  6 Units Subcutaneous Q24H    [START ON 6/6/2018] insulin aspart U-100  6 Units Subcutaneous Q24H    [START ON 6/6/2018] insulin aspart U-100  6 Units Subcutaneous Q24H    insulin aspart U-100  6 Units Subcutaneous Q24H    insulin aspart U-100  6 Units Subcutaneous Q24H    insulin aspart U-100  6 Units Subcutaneous Q24H    mirtazapine  7.5 mg Per NG tube QHS    polyethylene glycol  17 g Per NG tube Daily    ramelteon  8 mg Oral QHS    sodium chloride 0.9%  10 mL Intravenous Q6H    sodium chloride 0.9%  3 mL Intravenous Q8H     PRN Meds:sodium chloride, albumin human 5%, albuterol-ipratropium, bisacodyl, dextrose 50%, glucagon (human recombinant), hydrALAZINE, insulin aspart U-100, lactated ringers, metoclopramide HCl, ondansetron, oxyCODONE, Flushing PICC Protocol **AND** sodium chloride 0.9% **AND** sodium chloride 0.9%, white petrolatum-mineral oil     Objective:     Vital Signs (Most Recent):  Temp: 97.7 °F (36.5 °C) (06/05/18 1500)  Pulse: 78 (06/05/18 1500)  Resp: 19 (06/05/18 1500)  BP: 136/63 (06/05/18 1500)  SpO2: 100 % (06/05/18 1500) Vital Signs (24h Range):  Temp:  [97.7 °F (36.5 °C)-98.6 °F (37 °C)] 97.7 °F (36.5 °C)  Pulse:  [] 78  Resp:  [14-49] 19  SpO2:  [95 %-100 %] 100 %  BP:  (111-192)/(53-81) 136/63     Weight: 76.2 kg (167 lb 15.9 oz)  Body mass index is 24.1 kg/m².    SpO2: 100 %  O2 Device (Oxygen Therapy): room air    Intake/Output - Last 3 Shifts       06/03 0700 - 06/04 0659 06/04 0700 - 06/05 0659 06/05 0700 - 06/06 0659    I.V. (mL/kg) 1480.5 (19.4) 627 (8.2)     Blood  222     NG/GT 2140 200     TPN  669     Total Intake(mL/kg) 3620.5 (47.5) 1718 (22.5)     Urine (mL/kg/hr) 2070 (1.1) 2165 (1.2) 665 (1)    Total Output 2070 2165 665    Net +1550.5 -447 -665                 Lines/Drains/Airways     Peripherally Inserted Central Catheter Line                 PICC Double Lumen 03/05/18 1539 right basilic 91 days         PICC Double Lumen 06/04/18 1654 left brachial less than 1 day          Central Venous Catheter Line                 Percutaneous Central Line Insertion/Assessment - Quad lumen  05/25/18 0800 left subclavian 11 days          Drain                 Urethral Catheter 05/30/18 0445 Straight-tip 16 Fr. 6 days          Line                 Pacer Wires 05/25/18 1355 11 days                Physical Exam   Constitutional: He appears well-developed.   Cardiovascular: Normal rate.    Incision c/d/i   Pulmonary/Chest: Effort normal.   Neurological: He is alert.   Skin: Skin is warm and dry.       Significant Labs:  CBC:   Recent Labs  Lab 06/05/18  1013   WBC 16.95*   RBC 2.35*   HGB 7.2*   HCT 22.9*      MCV 97   MCH 30.6   MCHC 31.4*     CMP:   Recent Labs  Lab 06/05/18  0300   *   CALCIUM 8.9   ALBUMIN 2.3*   PROT 5.5*   *   K 3.7   CO2 27   *   BUN 75*   CREATININE 1.6*   ALKPHOS 94   ALT 38   AST 31   BILITOT 0.4     Coagulation:   Recent Labs  Lab 06/05/18  0300   INR 2.9*   APTT 39.5*

## 2018-06-05 NOTE — PLAN OF CARE
Problem: Patient Care Overview  Goal: Plan of Care Review  Outcome: Ongoing (interventions implemented as appropriate)  No acute events overnight. VSS. Pt AAOx4. Remains on room air. 1uPRBC infusing for low H&H. Will get up into chair once blood is finished. TPN, Lipids, and MIVF infusing. Orders received to keep midline currently. Lainez removed today once team rounds. Hydralazine given x2 for Map >90. Pt responds appropriately.

## 2018-06-05 NOTE — SUBJECTIVE & OBJECTIVE
Interval History/Significant Events:     Patient underwent barium swallow and was found to hae moderate oropharyngeal dysphagia. He currently lacks the strength/endurance to safely consume adequate nutrition/hydration. Speech therapy recommends continuation of his NPO status and puree/honey thick liquids for pleasure w/ 2-3 swallows per bolus. In addition, he requires strict aspiration precautions. TPN was ordered to start by primary team 2/2 continued delayed cough after swallowing. Patient remains on a D5 IV gtt. He continues to sat at 100% on RA. VSS overnight.    Follow-up For: Procedure(s) (LRB):  Mitral Valve Replacement (N/A)  AORTOCORONARY BYPASS-CABG (N/A)    Post-Operative Day: 11 Days Post-Op    Objective:     Vital Signs (Most Recent):  Temp: 98.4 °F (36.9 °C) (06/05/18 0539)  Pulse: 80 (06/05/18 0530)  Resp: (!) 21 (06/05/18 0530)  BP: (!) 124/59 (06/05/18 0500)  SpO2: 100 % (06/05/18 0530) Vital Signs (24h Range):  Temp:  [98.1 °F (36.7 °C)-98.6 °F (37 °C)] 98.4 °F (36.9 °C)  Pulse:  [] 80  Resp:  [14-49] 21  SpO2:  [95 %-100 %] 100 %  BP: (105-154)/(53-74) 124/59     Weight: 76.2 kg (167 lb 15.9 oz)  Body mass index is 24.1 kg/m².      Intake/Output Summary (Last 24 hours) at 06/05/18 0546  Last data filed at 06/05/18 0539   Gross per 24 hour   Intake          2858.48 ml   Output             2000 ml   Net           858.48 ml       Physical Exam   Constitutional: He is oriented to person, place, and time. He appears well-developed and well-nourished. No distress.   HENT:   Head: Normocephalic and atraumatic.   NG tube in place   Eyes: Conjunctivae are normal. No scleral icterus.   Cardiovascular: Normal rate.    Murmur heard.  Sternal incision c/d/i. No wound dehiscence. No purulent drainage, surrounding erythema, warmth or induration   Pulmonary/Chest: Effort normal. No respiratory distress.   Abdominal: Soft. He exhibits no distension. There is no tenderness.   Genitourinary:   Genitourinary  Comments: Lainez catheter in place with clear yellow urine in bag   Neurological: He is alert and oriented to person, place, and time.   Skin: Skin is warm and dry. No rash noted. He is not diaphoretic.   Psychiatric: He has a normal mood and affect. His behavior is normal.       Vents:  Vent Mode: Spont (05/26/18 2025)  Ventilator Initiated: Yes (05/25/18 1509)  Set Rate: 0 bmp (05/26/18 2025)  Vt Set: 500 mL (05/26/18 2025)  Pressure Support: 10 cmH20 (05/26/18 2025)  PEEP/CPAP: 5 cmH20 (05/26/18 2025)  Oxygen Concentration (%): 24 (05/29/18 0402)  Peak Airway Pressure: 16 cmH2O (05/26/18 2025)  Plateau Pressure: 0 cmH20 (05/26/18 2025)  Total Ve: 11.8 mL (05/26/18 2025)  F/VT Ratio<105 (RSBI): (!) 32.76 (05/26/18 2025)    Lines/Drains/Airways     Peripherally Inserted Central Catheter Line                 PICC Double Lumen 03/05/18 1539 right basilic 91 days         PICC Double Lumen 06/04/18 1654 left brachial less than 1 day          Central Venous Catheter Line                 Percutaneous Central Line Insertion/Assessment - Quad lumen  05/25/18 0800 left subclavian 10 days          Drain                 Urethral Catheter 05/30/18 0445 Straight-tip 16 Fr. 6 days          Line                 Pacer Wires 05/25/18 1355 10 days          Peripheral Intravenous Line                 Midline Catheter Insertion/Assessment  - Single Lumen 05/30/18 1623 Right basilic vein (medial side of arm) 18g x 8cm 5 days                Significant Labs:    CBC/Anemia Profile:    Recent Labs  Lab 06/04/18  0406 06/05/18  0300   WBC 8.95 8.11   HGB 6.8* 5.8*   HCT 21.5* 19.0*   * 138*   MCV 98 99*   RDW 16.6* 16.4*        Chemistries:    Recent Labs  Lab 06/03/18  1635 06/04/18  0406 06/04/18 2005 06/05/18  0300   * 153*  --  150*   K 4.2 4.1  --  3.7   * 115*  --  115*   CO2 28 28  --  27   BUN 90* 84*  --  75*   CREATININE 1.4 1.6*  --  1.6*   CALCIUM 9.5 9.7  --  8.9   ALBUMIN  --  2.5*  --  2.3*   PROT  --   5.8*  --  5.5*   BILITOT  --  0.6  --  0.4   ALKPHOS  --  106  --  94   ALT  --  45*  --  38   AST  --  40  --  31   MG  --  2.5 2.3 2.3   PHOS  --   --   --  3.8

## 2018-06-05 NOTE — ASSESSMENT & PLAN NOTE
66yo M with PMH of mitral valve endocarditis and resultant severe MR, TR and pulmonary HTN who is now s/p IABP placement (5/24) and s/pMVR and CABG 5/25.     Plan:     Neuro:   - Mental Status significantly improved since admission to the SICU. Recommend continued delirium precautions.   - AAOx4 this AM.   - Pain control per CTS.     Pulmonary:   - Currently on RA.   - Continuous pulse ox.   - Supplement O2 if necessary to maintain sats at >92.   - ABGs/CXR if distressed.        Cardiac:  - MAP goal >65  - Paced at 80  - Home amlodipine, ASA, and statin daily.  - TTE revealed LA thrombus. Therefore no DCCV at this time. EPS thinks amelia function may return in time. Started theophyline.   - Coumadin therapy.  - Continuous cardiac monitoring  - Pathology from valve returned with active subacute endocarditis. S/p ID consultation. PICC placed for long term abx administration.  Per ID Ampicillin and Ceftriaxone x 6 weeks for Enterococcal SBE (end date 7/13/18). In addition, patient will need ESR, CRP, CBC and CMP to be drawn weekly with results faxed to the ID Department at [phone] set up prior to discharge. Appreciate sw assistance.      Renal:   -UOP adequate.   -Bun/Cr stable.   -Lasix gtt off.   -Flomax daily given home use.  - MIVF.      Fluids/Electrolytes/Nutrition/GI:   - MIVF   - Na remains elevated currently on free water boluses.  - s/p barium swallow. Recommendations continued NPO status w/ puree/honey tick liquids only for pleasure w/ 2-3 adequate swallows per food bolus if there are no signs or symptoms of aspiration. TPN was started by primary team 2/2 concerns over aspiration and inadequate nutrition on 6/4/18.   - Replace lytes PRN.  - Bowel regimen.     Hematology/Oncology:  - H/H stable. Will continue to monitor.  -INR daily  -CBC daily     Infectious Disease:   -Afebrile  -WBC wnl  -CBC daily   -Pathology from valve with active endocarditis, ID consulted, recommend Ampicillin and Ceftriaxone x 6  weeks for Enterococcal SBE (end date 7/13/18). In addition, patient will need ESR, CRP, CBC and CMP to be drawn weekly with results faxed to the ID Department at [phone] set up prior to discharge. Appreciate sw assistance.      Endocrine:  - No h/o DM or thyroid dysfunction  - s/p Endocrine evaluation. Continue following recs.   - Glucose goal of 120-180     Dispo:  - CTS primary. Likely to step down soon per CTS. Will continue to follow while patient remains in the SICU.

## 2018-06-05 NOTE — PROGRESS NOTES
"[facility]  Endocrinology  Progress Note    Admit Date: 5/23/2018     Reason for Consult: Management of  Hyperglycemia     Surgical Procedure and Date: s/p CABG x 1, MVR  5/25/18    HPI: Patient is a 67 y.o. male with a diagnosis of mitral valve endocarditis and resultant severe MR, TR and pulmonary HTN. The etiology of his endocarditis remains unknown. He was admitted to the SICU pre-operatively for IABP placement 5/23/18. Underwent cardiac surgery 5/25/18.   No personal or family hx of DM  Lab Results   Component Value Date    HGBA1C 5.4 02/22/2018     Endocrine consulted for BG mgmt.             Interval HPI:   Overnight events: remains in ICU, awaiting step down bed. TPN started overnight; BG globally elevated. Tentative g-j tube placement; IR consulted per CTS.   Eating:   NPO  Nausea: No  Hypoglycemia and intervention: No  Fever: No  TPN : Yes at 60 cc/hr; will decrease to 50 cc/hr tonight.     BP (!) 121/56 (BP Location: Right arm, Patient Position: Sitting)   Pulse 83   Temp 97.7 °F (36.5 °C) (Oral)   Resp 20   Ht 5' 10" (1.778 m)   Wt 76.2 kg (167 lb 15.9 oz)   SpO2 100%   BMI 24.10 kg/m²       Labs Reviewed and Include      Recent Labs  Lab 06/05/18  0300   *   CALCIUM 8.9   ALBUMIN 2.3*   PROT 5.5*   *   K 3.7   CO2 27   *   BUN 75*   CREATININE 1.6*   ALKPHOS 94   ALT 38   AST 31   BILITOT 0.4     Lab Results   Component Value Date    WBC 8.11 06/05/2018    HGB 5.8 (LL) 06/05/2018    HCT 19.0 (LL) 06/05/2018    MCV 99 (H) 06/05/2018     (L) 06/05/2018     No results for input(s): TSH, FREET4 in the last 168 hours.  Lab Results   Component Value Date    HGBA1C 5.4 02/22/2018       Nutritional status:   Body mass index is 24.1 kg/m².  Lab Results   Component Value Date    ALBUMIN 2.3 (L) 06/05/2018    ALBUMIN 2.5 (L) 06/04/2018    ALBUMIN 2.5 (L) 06/03/2018     Lab Results   Component Value Date    PREALBUMIN 12 (L) 02/22/2018       Estimated Creatinine " Clearance: 46.3 mL/min (A) (based on SCr of 1.6 mg/dL (H)).    Accu-Checks  Recent Labs      06/03/18   1622  06/03/18   2007  06/03/18   2358  06/04/18   0359  06/04/18   0750  06/04/18   1143  06/04/18   1613  06/04/18   2102  06/05/18   0313  06/05/18   0747   POCTGLUCOSE  166*  157*  143*  182*  137*  144*  145*  135*  202*  238*       Hyperglycemia/Diabetes Medications: Antihyperglycemics     Start     Stop Route Frequency Ordered    06/03/18 0400  insulin aspart U-100 pen 5 Units      -- SubQ Every 24 hours (non-standard times) 06/02/18 0713    06/03/18 0000  insulin aspart U-100 pen 5 Units      -- SubQ Every 24 hours (non-standard times) 06/02/18 0713    06/02/18 2000  insulin aspart U-100 pen 5 Units      -- SubQ Every 24 hours (non-standard times) 06/02/18 0713    06/02/18 1600  insulin aspart U-100 pen 5 Units      -- SubQ Every 24 hours (non-standard times) 06/02/18 0713    06/02/18 1200  insulin aspart U-100 pen 5 Units      -- SubQ Every 24 hours (non-standard times) 06/02/18 0713    06/02/18 0800  insulin aspart U-100 pen 5 Units      -- SubQ Every 24 hours (non-standard times) 06/02/18 0713    05/30/18 1703  insulin aspart U-100 pen 0-5 Units      -- SubQ Every 4 hours PRN 05/30/18 1705          ASSESSMENT and PLAN    * S/P MVR (mitral valve replacement)    Per CTS  avoid hypoglycemia          Acute hyperglycemia    BG goal 140-180 mg/dl    Increase Novolog 6 units Q4hr with TPN  Continue bg monitoring q4 hours and low dose correction scale  Hold if TPN suspended/discontinued.     Please page endocrine NP on call with any BG related issues or concerns.       Discharge plans-TBD             Coronary artery disease of native artery of native heart with stable angina pectoris    Per CTS  avoid hypoglycemia          S/P CABG x 1    Per CTS  avoid hypoglycemia          Chronic systolic congestive heart failure    Per CTS  avoid hypoglycemia            Sequelae of hyperalimentation    May increase insulin  needs.           ANNA (acute kidney injury)    Estimated Creatinine Clearance: 46.3 mL/min (A) (based on SCr of 1.6 mg/dL (H)).  avoid hypoglycemia  Caution with insulin stacking                Lynn Canseco NP  Endocrinology  [facility]

## 2018-06-05 NOTE — PT/OT/SLP PROGRESS
Occupational Therapy   Treatment    Name: Rodolfo Messina  MRN: [id number]  Admitting Diagnosis:  S/P MVR (mitral valve replacement)  11 Days Post-Op    Recommendations:     Discharge Recommendations: rehabilitation facility  Discharge Equipment Recommendations:  none  Barriers to discharge:  None    Subjective     Communicated with: RN prior to session.  Pain/Comfort:  · Pain Rating 1: 0/10  · Pain Rating Post-Intervention 1: 0/10    Patients cultural, spiritual, Scientologist conflicts given the current situation: none reported    Objective:     Patient found with: blood pressure cuff, pulse ox (continuous), telemetry, PICC line, lackey catheter    General Precautions: Standard, fall, sternal, aspiration   Orthopedic Precautions:    Braces:       Occupational Performance:    Bed Mobility:    · Patient completed Rolling/Turning to Right with minimum assistance  · Patient completed Scooting/Bridging with minimum assistance  · Patient completed Supine to Sit with minimum assistance     Functional Mobility/Transfers:  · Patient completed Sit <> Stand Transfer with contact guard assistance  with  no assistive device   · Patient completed Bed <> Chair Transfer using Step Transfer technique with contact guard assistance with no assistive device  · Functional Mobility: CGA<>Minimal A to/from sink and in hallway with frequent rest breaks    Activities of Daily Living:  · Grooming: contact guard assistance standing at sink  · UB Dressing: moderate assistance    · LB Dressing: maximal assistance      Patient left up in chair with all lines intact, call button in reach, Rn notified and wife present    [facility] 6 Click:  [facility] Total Score: 14    Treatment & Education:  Pt ed on OT POC  Pt re-ed on sternal precautions during ADL  Pt with SOB; pursed lip breathing encouraged  Pt instructed in ROM ex's to be performe 10x 3-4x daily for increased aerobic activity  Education:    Assessment:     Rodolfo Messina is a 67 y.o. male with a  medical diagnosis of S/P MVR (mitral valve replacement).  Performance deficits affecting function are weakness, gait instability, impaired endurance, decreased coordination, impaired balance, decreased upper extremity function, impaired self care skills, impaired coordination, impaired fine motor, impaired cardiopulmonary response to activity.      Rehab Prognosis:  Good; patient would benefit from acute skilled OT services to address these deficits and reach maximum level of function.       Plan:     Patient to be seen 5 x/week to address the above listed problems via therapeutic exercises, therapeutic activities, self-care/home management  · Plan of Care Expires: 06/30/18  · Plan of Care Reviewed with: patient, spouse    This Plan of care has been discussed with the patient who was involved in its development and understands and is in agreement with the identified goals and treatment plan    GOALS:    Occupational Therapy Goals        Problem: Occupational Therapy Goal    Goal Priority Disciplines Outcome Interventions   Occupational Therapy Goal     OT, PT/OT Ongoing (interventions implemented as appropriate)    Description:  Goals to be met by: 6/10/18     Patient will increase functional independence with ADLs by performing:    Feeding with Modified [place].  UE Dressing with Supervision.  LE Dressing with Supervision.  Grooming while standing at sink with Supervision.  Toileting from toilet with Supervision for hygiene and clothing management.   Toilet transfer to toilet with Supervision.                       Time Tracking:     OT Date of Treatment: 06/05/18  OT Start Time: 0926  OT Stop Time: 0953  OT Total Time (min): 27 min    Billable Minutes:Self Care/Home Management 15  Therapeutic Exercise 8    MILTON Olguin  6/5/2018

## 2018-06-05 NOTE — CONSULTS
Radiology Consult    Rodolfo Messina is a 67 y.o. male with a history of endocarditis s/p MV replacement anticoagulated on warfarin, now with oropharyngeal dysphagia.  IR consulted for GJ tube placement per Dr. Go's request.    Past Medical History:   Diagnosis Date    ANNA (acute kidney injury) 2/22/2018    Anemia     Anxiety     Arthritis     BPH (benign prostatic hyperplasia)     CHF (congestive heart failure) 5/23/2018    Coronary artery disease of native artery of native heart with stable angina pectoris 5/21/2018    Diverticulosis     Encounter for blood transfusion     Hypertension     Urinary retention      Past Surgical History:   Procedure Laterality Date    APPENDECTOMY      COLONOSCOPY      COLONOSCOPY N/A 2/1/2018    Procedure: COLONOSCOPY;  Surgeon: Richar Payan MD;  Location: [facility] (4TH FLR);  Service: Endoscopy;  Laterality: N/A;  PM prep    CREATION OF AORTOCORONARY ARTERY BYPASS N/A 5/25/2018    Procedure: AORTOCORONARY BYPASS-CABG;  Surgeon: Marvin Go MD;  [facility];  Service: Cardiovascular;  Laterality: N/A;    EYE SURGERY Right     cataract extraction    FINGER SURGERY      FRACTURE SURGERY Right     index finger    MITRAL VALVE REPLACEMENT N/A 5/25/2018    Procedure: Mitral Valve Replacement;  Surgeon: Marvin Go MD;  Location: [facility] OR [address] [place] [address];  Service: Cardiovascular;  Laterality: N/A;    TONSILLECTOMY         Discussed with primary team.    Imaging reviewed with Radiology staff, Dr. Clayton.     Procedure: GJ tube placement    Scheduled Meds:    amLODIPine  10 mg Per NG tube Daily    ampicillin IVPB  2 g Intravenous Q6H    aspirin  324 mg Per NG tube Daily    atorvastatin  40 mg Per NG tube Daily    cefTRIAXone (ROCEPHIN) IVPB  2 g Intravenous Q12H    doxazosin  4 mg Per NG tube Daily    famotidine (PF)  20 mg Intravenous BID    fat emulsion 20%  250 mL Intravenous Daily    hydrALAZINE  25 mg Oral Q8H    [START ON  6/6/2018] insulin aspart U-100  6 Units Subcutaneous Q24H    [START ON 6/6/2018] insulin aspart U-100  6 Units Subcutaneous Q24H    [START ON 6/6/2018] insulin aspart U-100  6 Units Subcutaneous Q24H    insulin aspart U-100  6 Units Subcutaneous Q24H    insulin aspart U-100  6 Units Subcutaneous Q24H    insulin aspart U-100  6 Units Subcutaneous Q24H    mirtazapine  7.5 mg Per NG tube QHS    polyethylene glycol  17 g Per NG tube Daily    ramelteon  8 mg Oral QHS    sodium chloride 0.9%  10 mL Intravenous Q6H    sodium chloride 0.9%  3 mL Intravenous Q8H     Continuous Infusions:    dextrose 5 % 50 mL/hr at 06/05/18 1100    TPN ADULT CENTRAL LINE CUSTOM 60 mL/hr at 06/05/18 1100    TPN ADULT CENTRAL LINE CUSTOM       PRN Meds:sodium chloride, albumin human 5%, albuterol-ipratropium, bisacodyl, dextrose 50%, glucagon (human recombinant), hydrALAZINE, insulin aspart U-100, lactated ringers, metoclopramide HCl, ondansetron, oxyCODONE, Flushing PICC Protocol **AND** sodium chloride 0.9% **AND** sodium chloride 0.9%, white petrolatum-mineral oil    Allergies:   Review of patient's allergies indicates:   Allergen Reactions    Shellfish containing products     Augmentin [amoxicillin-pot clavulanate]      Patient felt that it raised BP and requested to have it added as intolerance. Tolerated unasyn and ampicillin.    Ciprofloxacin     Flagyl [metronidazole]     Lisinopril Other (See Comments)     cough    Mysoline [primidone]     Omnicef [cefdinir]        Labs:    Recent Labs  Lab 06/05/18  0300   INR 2.9*       Recent Labs  Lab 06/05/18  1013   WBC 16.95*   HGB 7.2*   HCT 22.9*   MCV 97         Recent Labs  Lab 06/05/18  0300 06/05/18  0750   *  --    *  --    K 3.7  --    *  --    CO2 27  --    BUN 75*  --    CREATININE 1.6*  --    CALCIUM 8.9  --    MG 2.3 2.7*   ALT 38  --    AST 31  --    ALBUMIN 2.3*  --    BILITOT 0.4  --          Vitals (Most Recent):  Temp: 97.7 °F (36.5  °C) (06/05/18 1100)  Pulse: 81 (06/05/18 1105)  Resp: 14 (06/05/18 1100)  BP: 130/60 (06/05/18 1100)  SpO2: 100 % (06/05/18 1105)    Plan:   1. NPO at midnight prior to procedure.  2. Hold anticoagulants:       -Ideally would hold aspirin x 5 days prior to GJ tube placement.  Given contraindication to holding ASA (per primary team), can proceed with the GJ tube if the patient is understanding of the increased risk of bleeding.         -Primary team will hold warfarin for the procedure.  Goal INR <1.5.  3. Please contact IR when INR is at goal so the patient may be scheduled for GJ tube placement.     Sheila Alford MD  Resident  Department of Radiology  Pager: [phone]

## 2018-06-06 LAB
ALBUMIN SERPL BCP-MCNC: 2.3 G/DL
ALP SERPL-CCNC: 92 U/L
ALT SERPL W/O P-5'-P-CCNC: 30 U/L
ANION GAP SERPL CALC-SCNC: 10 MMOL/L
APTT BLDCRRT: 36.2 SEC
AST SERPL-CCNC: 19 U/L
BACTERIA BLD CULT: NORMAL
BACTERIA BLD CULT: NORMAL
BASOPHILS # BLD AUTO: 0.01 K/UL
BASOPHILS NFR BLD: 0.1 %
BILIRUB SERPL-MCNC: 0.5 MG/DL
BUN SERPL-MCNC: 74 MG/DL
CALCIUM SERPL-MCNC: 8.9 MG/DL
CHLORIDE SERPL-SCNC: 115 MMOL/L
CO2 SERPL-SCNC: 26 MMOL/L
CREAT SERPL-MCNC: 1.6 MG/DL
DIFFERENTIAL METHOD: ABNORMAL
EOSINOPHIL # BLD AUTO: 0.2 K/UL
EOSINOPHIL NFR BLD: 2.4 %
ERYTHROCYTE [DISTWIDTH] IN BLOOD BY AUTOMATED COUNT: 17.2 %
EST. GFR  (AFRICAN AMERICAN): 50.8 ML/MIN/1.73 M^2
EST. GFR  (NON AFRICAN AMERICAN): 43.9 ML/MIN/1.73 M^2
GLUCOSE SERPL-MCNC: 182 MG/DL
HCT VFR BLD AUTO: 21.1 %
HGB BLD-MCNC: 6.7 G/DL
IMM GRANULOCYTES # BLD AUTO: 0.07 K/UL
IMM GRANULOCYTES NFR BLD AUTO: 0.7 %
INR PPP: 2.2
LYMPHOCYTES # BLD AUTO: 0.6 K/UL
LYMPHOCYTES NFR BLD: 5.8 %
MAGNESIUM SERPL-MCNC: 2.9 MG/DL
MCH RBC QN AUTO: 30.7 PG
MCHC RBC AUTO-ENTMCNC: 31.8 G/DL
MCV RBC AUTO: 97 FL
MONOCYTES # BLD AUTO: 0.6 K/UL
MONOCYTES NFR BLD: 5.6 %
NEUTROPHILS # BLD AUTO: 8.7 K/UL
NEUTROPHILS NFR BLD: 85.4 %
NRBC BLD-RTO: 0 /100 WBC
PHOSPHATE SERPL-MCNC: 3.8 MG/DL
PLATELET # BLD AUTO: 148 K/UL
PMV BLD AUTO: 12.5 FL
POCT GLUCOSE: 170 MG/DL (ref 70–110)
POCT GLUCOSE: 174 MG/DL (ref 70–110)
POCT GLUCOSE: 198 MG/DL (ref 70–110)
POCT GLUCOSE: 219 MG/DL (ref 70–110)
POCT GLUCOSE: 226 MG/DL (ref 70–110)
POCT GLUCOSE: 284 MG/DL (ref 70–110)
POTASSIUM SERPL-SCNC: 3.4 MMOL/L
POTASSIUM SERPL-SCNC: 3.8 MMOL/L
PREALB SERPL-MCNC: 20 MG/DL
PROT SERPL-MCNC: 5.8 G/DL
PROTHROMBIN TIME: 21.1 SEC
RBC # BLD AUTO: 2.18 M/UL
SODIUM SERPL-SCNC: 151 MMOL/L
TRIGL SERPL-MCNC: 53 MG/DL
WBC # BLD AUTO: 10.17 K/UL

## 2018-06-06 PROCEDURE — 84134 ASSAY OF PREALBUMIN: CPT

## 2018-06-06 PROCEDURE — 25000003 PHARM REV CODE 250: Performed by: STUDENT IN AN ORGANIZED HEALTH CARE EDUCATION/TRAINING PROGRAM

## 2018-06-06 PROCEDURE — 63600175 PHARM REV CODE 636 W HCPCS: Performed by: NURSE PRACTITIONER

## 2018-06-06 PROCEDURE — 99232 SBSQ HOSP IP/OBS MODERATE 35: CPT | Mod: ,,, | Performed by: ANESTHESIOLOGY

## 2018-06-06 PROCEDURE — 25000003 PHARM REV CODE 250: Performed by: THORACIC SURGERY (CARDIOTHORACIC VASCULAR SURGERY)

## 2018-06-06 PROCEDURE — A4216 STERILE WATER/SALINE, 10 ML: HCPCS | Performed by: THORACIC SURGERY (CARDIOTHORACIC VASCULAR SURGERY)

## 2018-06-06 PROCEDURE — 20600001 HC STEP DOWN PRIVATE ROOM

## 2018-06-06 PROCEDURE — 63600175 PHARM REV CODE 636 W HCPCS: Performed by: PHYSICIAN ASSISTANT

## 2018-06-06 PROCEDURE — 92526 ORAL FUNCTION THERAPY: CPT

## 2018-06-06 PROCEDURE — B4185 PARENTERAL SOL 10 GM LIPIDS: HCPCS | Performed by: STUDENT IN AN ORGANIZED HEALTH CARE EDUCATION/TRAINING PROGRAM

## 2018-06-06 PROCEDURE — 85730 THROMBOPLASTIN TIME PARTIAL: CPT

## 2018-06-06 PROCEDURE — 97535 SELF CARE MNGMENT TRAINING: CPT

## 2018-06-06 PROCEDURE — 99232 SBSQ HOSP IP/OBS MODERATE 35: CPT | Mod: ,,, | Performed by: NURSE PRACTITIONER

## 2018-06-06 PROCEDURE — 63600175 PHARM REV CODE 636 W HCPCS: Performed by: STUDENT IN AN ORGANIZED HEALTH CARE EDUCATION/TRAINING PROGRAM

## 2018-06-06 PROCEDURE — 93010 ELECTROCARDIOGRAM REPORT: CPT | Mod: ,,, | Performed by: INTERNAL MEDICINE

## 2018-06-06 PROCEDURE — S0028 INJECTION, FAMOTIDINE, 20 MG: HCPCS | Performed by: STUDENT IN AN ORGANIZED HEALTH CARE EDUCATION/TRAINING PROGRAM

## 2018-06-06 PROCEDURE — 84478 ASSAY OF TRIGLYCERIDES: CPT

## 2018-06-06 PROCEDURE — 25000003 PHARM REV CODE 250: Performed by: NURSE PRACTITIONER

## 2018-06-06 PROCEDURE — 85025 COMPLETE CBC W/AUTO DIFF WBC: CPT

## 2018-06-06 PROCEDURE — 94761 N-INVAS EAR/PLS OXIMETRY MLT: CPT

## 2018-06-06 PROCEDURE — 83735 ASSAY OF MAGNESIUM: CPT

## 2018-06-06 PROCEDURE — 84100 ASSAY OF PHOSPHORUS: CPT

## 2018-06-06 PROCEDURE — 85610 PROTHROMBIN TIME: CPT

## 2018-06-06 PROCEDURE — 84132 ASSAY OF SERUM POTASSIUM: CPT

## 2018-06-06 PROCEDURE — 25000003 PHARM REV CODE 250: Performed by: PHYSICIAN ASSISTANT

## 2018-06-06 PROCEDURE — 80053 COMPREHEN METABOLIC PANEL: CPT

## 2018-06-06 PROCEDURE — A4217 STERILE WATER/SALINE, 500 ML: HCPCS | Performed by: STUDENT IN AN ORGANIZED HEALTH CARE EDUCATION/TRAINING PROGRAM

## 2018-06-06 PROCEDURE — 93005 ELECTROCARDIOGRAM TRACING: CPT

## 2018-06-06 PROCEDURE — 97116 GAIT TRAINING THERAPY: CPT

## 2018-06-06 RX ORDER — POTASSIUM CHLORIDE 14.9 MG/ML
20 INJECTION INTRAVENOUS ONCE
Status: COMPLETED | OUTPATIENT
Start: 2018-06-06 | End: 2018-06-06

## 2018-06-06 RX ORDER — POTASSIUM CHLORIDE 29.8 MG/ML
40 INJECTION INTRAVENOUS ONCE
Status: COMPLETED | OUTPATIENT
Start: 2018-06-06 | End: 2018-06-06

## 2018-06-06 RX ORDER — MIRTAZAPINE 7.5 MG/1
15 TABLET, FILM COATED ORAL NIGHTLY
Status: DISCONTINUED | OUTPATIENT
Start: 2018-06-06 | End: 2018-06-08

## 2018-06-06 RX ADMIN — DOXAZOSIN MESYLATE 4 MG: 2 TABLET ORAL at 08:06

## 2018-06-06 RX ADMIN — HYDRALAZINE HYDROCHLORIDE 25 MG: 25 TABLET, FILM COATED ORAL at 06:06

## 2018-06-06 RX ADMIN — CEFTRIAXONE SODIUM 2 G: 2 INJECTION, POWDER, FOR SOLUTION INTRAMUSCULAR; INTRAVENOUS at 03:06

## 2018-06-06 RX ADMIN — AMIODARONE HYDROCHLORIDE 1 MG/MIN: 1.8 INJECTION, SOLUTION INTRAVENOUS at 07:06

## 2018-06-06 RX ADMIN — INSULIN ASPART 6 UNITS: 100 INJECTION, SOLUTION INTRAVENOUS; SUBCUTANEOUS at 04:06

## 2018-06-06 RX ADMIN — POTASSIUM CHLORIDE 40 MEQ: 400 INJECTION, SOLUTION INTRAVENOUS at 06:06

## 2018-06-06 RX ADMIN — INSULIN ASPART 2 UNITS: 100 INJECTION, SOLUTION INTRAVENOUS; SUBCUTANEOUS at 12:06

## 2018-06-06 RX ADMIN — Medication 10 ML: at 12:06

## 2018-06-06 RX ADMIN — FAMOTIDINE 20 MG: 10 INJECTION INTRAVENOUS at 09:06

## 2018-06-06 RX ADMIN — FAMOTIDINE 20 MG: 10 INJECTION INTRAVENOUS at 08:06

## 2018-06-06 RX ADMIN — ASPIRIN 81 MG 324 MG: 81 TABLET ORAL at 08:06

## 2018-06-06 RX ADMIN — AMPICILLIN 2 G: 2 INJECTION, POWDER, FOR SOLUTION INTRAVENOUS at 05:06

## 2018-06-06 RX ADMIN — INSULIN ASPART 2 UNITS: 100 INJECTION, SOLUTION INTRAVENOUS; SUBCUTANEOUS at 03:06

## 2018-06-06 RX ADMIN — SOYBEAN OIL 250 ML: 20 INJECTION, SOLUTION INTRAVENOUS at 11:06

## 2018-06-06 RX ADMIN — CALCIUM GLUCONATE: 94 INJECTION, SOLUTION INTRAVENOUS at 10:06

## 2018-06-06 RX ADMIN — HYDRALAZINE HYDROCHLORIDE 25 MG: 25 TABLET, FILM COATED ORAL at 01:06

## 2018-06-06 RX ADMIN — AMPICILLIN 2 G: 2 INJECTION, POWDER, FOR SOLUTION INTRAVENOUS at 10:06

## 2018-06-06 RX ADMIN — AMLODIPINE BESYLATE 10 MG: 10 TABLET ORAL at 08:06

## 2018-06-06 RX ADMIN — INSULIN ASPART 6 UNITS: 100 INJECTION, SOLUTION INTRAVENOUS; SUBCUTANEOUS at 12:06

## 2018-06-06 RX ADMIN — CEFTRIAXONE SODIUM 2 G: 2 INJECTION, POWDER, FOR SOLUTION INTRAMUSCULAR; INTRAVENOUS at 06:06

## 2018-06-06 RX ADMIN — INSULIN ASPART 6 UNITS: 100 INJECTION, SOLUTION INTRAVENOUS; SUBCUTANEOUS at 08:06

## 2018-06-06 RX ADMIN — ATORVASTATIN CALCIUM 40 MG: 20 TABLET, FILM COATED ORAL at 08:06

## 2018-06-06 RX ADMIN — Medication 3 ML: at 02:06

## 2018-06-06 RX ADMIN — POLYETHYLENE GLYCOL 3350 17 G: 17 POWDER, FOR SOLUTION ORAL at 08:06

## 2018-06-06 RX ADMIN — Medication 3 ML: at 09:06

## 2018-06-06 RX ADMIN — AMPICILLIN 2 G: 2 INJECTION, POWDER, FOR SOLUTION INTRAVENOUS at 06:06

## 2018-06-06 RX ADMIN — Medication 3 ML: at 06:06

## 2018-06-06 RX ADMIN — AMIODARONE HYDROCHLORIDE 150 MG: 1.5 INJECTION, SOLUTION INTRAVENOUS at 06:06

## 2018-06-06 RX ADMIN — POTASSIUM CHLORIDE 20 MEQ: 200 INJECTION, SOLUTION INTRAVENOUS at 03:06

## 2018-06-06 RX ADMIN — Medication 10 ML: at 06:06

## 2018-06-06 NOTE — PROGRESS NOTES
Dr. Perrin notified of repeat bladder scan volume >200.  Pt now reporting urge to void, but still unable to void independently.  Order received to reinsert lackey.  Will carry out orders and continue to monitor.

## 2018-06-06 NOTE — NURSING TRANSFER
Nursing Transfer Note      6/6/2018     Transfer To: 311    Transfer via wheelchair    Transfer with cardiac monitoring    Transported by RN and PCT    Medicines sent: insulin pen    Chart send with patient: Yes    Notified: spouse @ bedside    Patient reassessed at: 6/6/2018, 1430 (date, time)    Upon arrival to floor: cardiac monitor applied, patient oriented to room, call bell in reach and bed in lowest position.

## 2018-06-06 NOTE — PROGRESS NOTES
[facility]  Critical Care - Surgery  Progress Note    Patient Name: Rodolfo Messina  MRN: [id number]  Admission Date: 5/23/2018  Hospital Length of Stay: 14 days  Code Status: Full Code  Attending Provider: Marvin Go MD  Primary Care Provider: Deric Claudio MD   Principal Problem: S/P MVR (mitral valve replacement)    Subjective:     Hospital/ICU Course:  No notes on file    Interval History/Significant Events:     Mr. Messina had no acute overnight events. He was resting comfortably this morning and in no acute distress. Patient remains on RA w/ sats at >95%, afebrile w/ stable vital signs, on TPN for nutrition, D5 gtt/free water pushes for elevated Na, and he continues to make adequate urine output.     Follow-up For: Procedure(s) (LRB):  Mitral Valve Replacement (N/A)  AORTOCORONARY BYPASS-CABG (N/A)    Post-Operative Day: 12 Days Post-Op    Objective:     Vital Signs (Most Recent):  Temp: 97.6 °F (36.4 °C) (06/06/18 0300)  Pulse: 69 (06/06/18 0545)  Resp: 15 (06/06/18 0545)  BP: (!) 117/56 (06/06/18 0530)  SpO2: 100 % (06/06/18 0545) Vital Signs (24h Range):  Temp:  [97.6 °F (36.4 °C)-97.9 °F (36.6 °C)] 97.6 °F (36.4 °C)  Pulse:  [68-97] 69  Resp:  [14-46] 15  SpO2:  [97 %-100 %] 100 %  BP: ()/(52-81) 117/56     Weight: 77.1 kg (169 lb 15.6 oz)  Body mass index is 24.39 kg/m².      Intake/Output Summary (Last 24 hours) at 06/06/18 0559  Last data filed at 06/06/18 0500   Gross per 24 hour   Intake             1403 ml   Output             1685 ml   Net             -282 ml       Physical Exam   Constitutional: He is oriented to person, place, and time. He appears well-developed and well-nourished. No distress.   HENT:   Head: Normocephalic and atraumatic.   NG tube in place   Eyes: Conjunctivae are normal. No scleral icterus.   Cardiovascular: Normal rate.    Murmur heard.  Sternal incision c/d/i. No wound dehiscence. No purulent drainage, surrounding erythema, warmth or  induration   Pulmonary/Chest: Effort normal. No respiratory distress.   Abdominal: Soft. He exhibits no distension. There is no tenderness.   Genitourinary:   Genitourinary Comments: Lainez catheter in place with clear yellow urine in bag   Neurological: He is alert and oriented to person, place, and time.   Skin: Skin is warm and dry. No rash noted. He is not diaphoretic.   Psychiatric: He has a normal mood and affect. His behavior is normal.       Vents:  Vent Mode: Spont (05/26/18 2025)  Ventilator Initiated: Yes (05/25/18 1509)  Set Rate: 0 bmp (05/26/18 2025)  Vt Set: 500 mL (05/26/18 2025)  Pressure Support: 10 cmH20 (05/26/18 2025)  PEEP/CPAP: 5 cmH20 (05/26/18 2025)  Oxygen Concentration (%): 24 (05/29/18 0402)  Peak Airway Pressure: 16 cmH2O (05/26/18 2025)  Plateau Pressure: 0 cmH20 (05/26/18 2025)  Total Ve: 11.8 mL (05/26/18 2025)  F/VT Ratio<105 (RSBI): (!) 32.76 (05/26/18 2025)    Lines/Drains/Airways     Peripherally Inserted Central Catheter Line                 PICC Double Lumen 03/05/18 1539 right basilic 92 days         PICC Double Lumen 06/04/18 1654 left brachial 1 day          Central Venous Catheter Line                 Percutaneous Central Line Insertion/Assessment - Quad lumen  05/25/18 0800 left subclavian 11 days          Drain                 Urethral Catheter 06/06/18 0030 less than 1 day          Line                 Pacer Wires 05/25/18 1355 11 days          Peripheral Intravenous Line                 Peripheral IV - Single Lumen 06/05/18 1300 Right Hand less than 1 day                Significant Labs:    CBC/Anemia Profile:    Recent Labs  Lab 06/05/18  0300 06/05/18  1013 06/06/18  0355   WBC 8.11 16.95* 10.17   HGB 5.8* 7.2* 6.7*   HCT 19.0* 22.9* 21.1*   * 187 148*   MCV 99* 97 97   RDW 16.4* 16.5* 17.2*        Chemistries:    Recent Labs  Lab 06/05/18  0300 06/05/18  0750 06/05/18 2008 06/06/18  0355   *  --   --  151*   K 3.7  --   --  3.4*   *  --   --  115*    CO2 27  --   --  26   BUN 75*  --   --  74*   CREATININE 1.6*  --   --  1.6*   CALCIUM 8.9  --   --  8.9   ALBUMIN 2.3*  --   --  2.3*   PROT 5.5*  --   --  5.8*   BILITOT 0.4  --   --  0.5   ALKPHOS 94  --   --  92   ALT 38  --   --  30   AST 31  --   --  19   MG 2.3 2.7* 2.8*  --    PHOS 3.8  --   --  3.8         Assessment/Plan:     Chronic systolic congestive heart failure     68yo M with PMH of mitral valve endocarditis and resultant severe MR, TR and pulmonary HTN who is now s/p IABP placement (5/24) and s/pMVR and CABG 5/25.     Plan:     Neuro:   - Mental Status significantly improved since admission to the SICU. Recommend continued delirium precautions.   - AAOx4 this AM.   - Pain control per CTS.     Pulmonary:   - Currently on RA.   - Continuous pulse ox.   - Supplement O2 if necessary to maintain sats at >92.   - ABGs/CXR if distressed.        Cardiac:  - MAP goal >65  - Paced at 80  - Home amlodipine, ASA, and statin daily.  - TTE revealed LA thrombus. Therefore no DCCV at this time. EPS thinks amelia function may return in time. Started theophyline.   - Coumadin therapy.  - Continuous cardiac monitoring.  - Pathology from valve returned with active subacute endocarditis. S/p ID consultation. PICC placed for long term abx administration.  Per ID Ampicillin and Ceftriaxone x 6 weeks for Enterococcal SBE (end date 7/13/18). In addition, patient will need ESR, CRP, CBC and CMP to be drawn weekly with results faxed to the ID Department at [phone] set up prior to discharge. Appreciate sw assistance.      Renal:   -UOP adequate.   -Bun/Cr stable.   -Lasix gtt off.   -Flomax daily given home use.  - MIVF.      Fluids/Electrolytes/Nutrition/GI:   - MIVF   - Na remains elevated currently on free water boluses and D5 gtt.   - s/p barium swallow. Recommendations continued NPO status w/ puree/honey tick liquids only for pleasure w/ 2-3 adequate swallows per food bolus if there are no signs or symptoms of  aspiration. TPN was started by primary team 2/2 concerns over aspiration and inadequate nutrition on 6/4/18.   - Replace lytes PRN.  - Bowel regimen.     Hematology/Oncology:  - Hemoglobin < 7 today. Transfusion per primary team.  - No overt signs of bleeding or hemolysis. Will continue to monitor.   - INR daily.  - CBC daily.     Infectious Disease:   -Afebrile  -WBC wnl  -CBC daily   -Pathology from valve with active endocarditis, ID consulted, recommend Ampicillin and Ceftriaxone x 6 weeks for Enterococcal SBE (end date 7/13/18). In addition, patient will need ESR, CRP, CBC and CMP to be drawn weekly with results faxed to the ID Department at [phone] set up prior to discharge. Appreciate sw assistance.      Endocrine:  - No h/o DM or thyroid dysfunction  - s/p Endocrine evaluation. Continue following recs.   - Glucose goal of 120-180     Dispo:  - CTS primary. Step down orders in. Awaiting bed.                     Critical care was time spent personally by me on the following activities: development of treatment plan with patient or surrogate and bedside caregivers, discussions with consultants, evaluation of patient's response to treatment, examination of patient, ordering and performing treatments and interventions, ordering and review of laboratory studies, ordering and review of radiographic studies, pulse oximetry, re-evaluation of patient's condition.  This critical care time did not overlap with that of any other provider or involve time for any procedures.     Patrick Hawkins MD  Critical Care - Surgery  [facility]

## 2018-06-06 NOTE — PLAN OF CARE
Problem: Physical Therapy Goal  Goal: Physical Therapy Goal  Goals to be met by: 6/15/2018    Patient will increase functional independence with mobility by performin. Supine to sit with Supervision - not met  2. Sit to stand transfer with Supervision - not met  3. Bed to chair transfer with Supervision  - not met  4. Gait  x 250 feet with Supervision - not met  5. Lower extremity exercise program x20 reps per handout, with independence - met       Outcome: Ongoing (interventions implemented as appropriate)  Treatment completed and no goals met. Date updated. Goals appropriate.

## 2018-06-06 NOTE — PT/OT/SLP PROGRESS
Physical Therapy Treatment/ Co-Treat with OT    Patient Name:  Rodolfo Messina   MRN:  [id number]    Recommendations:     Discharge Recommendations:  rehabilitation facility   Discharge Equipment Recommendations: none   Barriers to discharge: None    Assessment:     Rodolfo Messina is a 67 y.o. male admitted with a medical diagnosis of S/P MVR (mitral valve replacement).  He presents with the following impairments/functional limitations:  weakness, impaired endurance, gait instability, impaired functional mobilty, impaired balance. Pt chad treatment well with CGA in transfers and gait training, however, pt c/o weakness. Sternal precautions in place. Will cont with skilled PT services 5x/wk to improve strength, endurance, and reach highest level of function. Recommending IP rehab once medically stable. MVR and CABG 05/25/18.    Rehab Prognosis:  good; patient would benefit from acute skilled PT services to address these deficits and reach maximum level of function.      Recent Surgery: Procedure(s) (LRB):  Mitral Valve Replacement (N/A)  AORTOCORONARY BYPASS-CABG (N/A) 12 Days Post-Op    Plan:     During this hospitalization, patient to be seen 5 x/week to address the above listed problems via gait training, therapeutic activities, therapeutic exercises  · Plan of Care Expires:  06/25/18   Plan of Care Reviewed with: patient, spouse    Subjective     Communicated with nurse prior to session.  Patient found sitting in chair upon PT entry to room, agreeable to treatment.      Chief Complaint: weakness during transfers and gait  Patient comments/goals: to get better and go home  Pain/Comfort:  ·      Patients cultural, spiritual, Oriental orthodox conflicts given the current situation: none    Objective:     Patient found with: PICC line, pulse ox (continuous), blood pressure cuff, telemetry, lackey catheter, peripheral IV     General Precautions: Standard, fall, sternal   Orthopedic Precautions:N/A   Braces:       Functional  Mobility:  · Transfers:     · Sit to Stand: 3 trials while at sink with contact guard assistance with no AD. Able to stand for ~5-10 seconds before sitting back down. Pt reports being too weak. Pt was unable to recall all of sternal precautions. Gave sternal precautions verbally. Pt gave verbal understanding of such.   · Gait: 20 ft from room door to sofa with CGA plus 1 for handling equipment. All lines intact.       AM-PAC 6 CLICK MOBILITY  Turning over in bed (including adjusting bedclothes, sheets and blankets)?: 3  Sitting down on and standing up from a chair with arms (e.g., wheelchair, bedside commode, etc.): 3  Moving from lying on back to sitting on the side of the bed?: 3  Moving to and from a bed to a chair (including a wheelchair)?: 3  Need to walk in hospital room?: 2  Climbing 3-5 steps with a railing?: 1  Total Score: 15       Therapeutic Activities and Exercises:   Provided sternal precautions verbally. Pt gave verbal understanding of such.     Patient left sitting in sofa next to wife with all lines intact, call button in reach and wife present..    GOALS:    Physical Therapy Goals        Problem: Physical Therapy Goal    Goal Priority Disciplines Outcome Goal Variances Interventions   Physical Therapy Goal     PT/OT, PT Ongoing (interventions implemented as appropriate)     Description:  Goals to be met by: 6/15/2018    Patient will increase functional independence with mobility by performin. Supine to sit with Supervision - not met  2. Sit to stand transfer with Supervision - not met  3. Bed to chair transfer with Supervision  - not met  4. Gait  x 250 feet with Supervision - not met  5. Lower extremity exercise program x20 reps per handout, with independence - met                         Time Tracking:     PT Received On: 18  PT Start Time: 1024     PT Stop Time: 1044  PT Total Time (min): 20 min     Billable Minutes: Gait Training 8 minutes     Treatment Type: Treatment  PT/PTA:  PT     PTA Visit Number: 0     Lorena Padgett, SPT  06/06/2018

## 2018-06-06 NOTE — H&P
Inpatient Radiology Pre-procedure Note    History of Present Illness:  Rodolfo Messina is a 67 y.o. male who presents for GJ tube placement.    Admission H&P reviewed.  Past Medical History:   Diagnosis Date    ANNA (acute kidney injury) 2/22/2018    Anemia     Anxiety     Arthritis     BPH (benign prostatic hyperplasia)     CHF (congestive heart failure) 5/23/2018    Coronary artery disease of native artery of native heart with stable angina pectoris 5/21/2018    Diverticulosis     Encounter for blood transfusion     Hypertension     Urinary retention      Past Surgical History:   Procedure Laterality Date    APPENDECTOMY      COLONOSCOPY      COLONOSCOPY N/A 2/1/2018    Procedure: COLONOSCOPY;  Surgeon: Richar Payan MD;  Location: [facility] (4TH FLR);  Service: Endoscopy;  Laterality: N/A;  PM prep    CREATION OF AORTOCORONARY ARTERY BYPASS N/A 5/25/2018    Procedure: AORTOCORONARY BYPASS-CABG;  Surgeon: Mavrin Go MD;  Location: [facility] OR 2ND FLR;  Service: Cardiovascular;  Laterality: N/A;    EYE SURGERY Right     cataract extraction    FINGER SURGERY      FRACTURE SURGERY Right     index finger    MITRAL VALVE REPLACEMENT N/A 5/25/2018    Procedure: Mitral Valve Replacement;  Surgeon: Marvin Go MD;  [facility];  Service: Cardiovascular;  Laterality: N/A;    TONSILLECTOMY         Review of Systems:   As documented in primary team H&P    Home Meds:   Prior to Admission medications    Medication Sig Start Date End Date Taking? Authorizing Provider   albuterol-ipratropium 2.5mg-0.5mg/3mL (DUO-NEB) 0.5 mg-3 mg(2.5 mg base)/3 mL nebulizer solution 3 mLs every 4 (four) hours as needed.  3/8/18   Historical Provider, MD   aspirin (ECOTRIN) 81 MG EC tablet Take 1 tablet (81 mg total) by mouth once daily. 5/8/18 5/8/19  Karyn Quintero MD   atorvastatin (LIPITOR) 20 MG tablet Take 2 tablets (40 mg total) by mouth once daily. 5/8/18 5/8/19  Karyn Quintero MD    benzonatate (TESSALON) 100 MG capsule Take 100 mg by mouth 3 (three) times daily as needed for Cough. 3/8/18   Historical Provider, MD   furosemide (LASIX) 40 MG tablet Take 1 tablet (40 mg total) by mouth once daily. 3/6/18 3/6/19  Babak Magallanes MD   loratadine (CLARITIN) 10 mg tablet Take 10 mg by mouth once daily.    Historical Provider, MD   mirtazapine (REMERON) 30 MG tablet Take 1 tablet (30 mg total) by mouth every evening. 4/26/18   Deric Claudio MD   pantoprazole (PROTONIX) 40 MG tablet Take 1 tablet (40 mg total) by mouth once daily. 3/28/18 3/28/19  Gloria Merritt MD   tamsulosin (FLOMAX) 0.4 mg Cp24 Take 2 capsules (0.8 mg total) by mouth 2 (two) times daily. 3/6/18 3/6/19  Babak Magallanes MD   VENTOLIN HFA 90 mcg/actuation inhaler  12/29/17   Historical Provider, MD     Scheduled Meds:    amLODIPine  10 mg Per NG tube Daily    ampicillin IVPB  2 g Intravenous Q6H    aspirin  324 mg Per NG tube Daily    atorvastatin  40 mg Per NG tube Daily    cefTRIAXone (ROCEPHIN) IVPB  2 g Intravenous Q12H    doxazosin  4 mg Per NG tube Daily    famotidine (PF)  20 mg Intravenous BID    fat emulsion 20%  250 mL Intravenous Daily    hydrALAZINE  25 mg Oral Q8H    insulin aspart U-100  6 Units Subcutaneous Q24H    insulin aspart U-100  6 Units Subcutaneous Q24H    insulin aspart U-100  6 Units Subcutaneous Q24H    insulin aspart U-100  6 Units Subcutaneous Q24H    insulin aspart U-100  6 Units Subcutaneous Q24H    insulin aspart U-100  6 Units Subcutaneous Q24H    mirtazapine  7.5 mg Per NG tube QHS    polyethylene glycol  17 g Per NG tube Daily    ramelteon  8 mg Oral QHS    sodium chloride 0.9%  10 mL Intravenous Q6H    sodium chloride 0.9%  3 mL Intravenous Q8H     Continuous Infusions:    dextrose 5 % 50 mL/hr at 06/06/18 0600    TPN ADULT CENTRAL LINE CUSTOM 50 mL/hr at 06/06/18 0600     PRN Meds:sodium chloride, albumin human 5%, albuterol-ipratropium, bisacodyl, dextrose  50%, glucagon (human recombinant), hydrALAZINE, insulin aspart U-100, lactated ringers, metoclopramide HCl, ondansetron, oxyCODONE, Flushing PICC Protocol **AND** sodium chloride 0.9% **AND** sodium chloride 0.9%, white petrolatum-mineral oil  Anticoagulants/Antiplatelets: aspirin and Coumadin    Allergies:   Review of patient's allergies indicates:   Allergen Reactions    Shellfish containing products     Augmentin [amoxicillin-pot clavulanate]      Patient felt that it raised BP and requested to have it added as intolerance. Tolerated unasyn and ampicillin.    Ciprofloxacin     Flagyl [metronidazole]     Lisinopril Other (See Comments)     cough    Mysoline [primidone]     Omnicef [cefdinir]      Sedation Hx: have not been any systemic reactions    Labs:    Recent Labs  Lab 06/06/18  0355   INR 2.2*       Recent Labs  Lab 06/06/18  0355   WBC 10.17   HGB 6.7*   HCT 21.1*   MCV 97   *      Recent Labs  Lab 06/05/18 2008 06/06/18  0355   GLU  --  182*   NA  --  151*   K  --  3.4*   CL  --  115*   CO2  --  26   BUN  --  74*   CREATININE  --  1.6*   CALCIUM  --  8.9   MG 2.8*  --    ALT  --  30   AST  --  19   ALBUMIN  --  2.3*   BILITOT  --  0.5         Vitals:  Temp: 97.6 °F (36.4 °C) (06/06/18 0300)  Pulse: 69 (06/06/18 0630)  Resp: (!) 35 (06/06/18 0630)  BP: (!) 121/59 (06/06/18 0600)  SpO2: 100 % (06/06/18 0630)     Physical Exam:  ASA: 3  Mallampati: 1         Plan: GJ tube placement  Sedation Plan: sixto Walker MD  Department of Radiology  Pager: [phone]

## 2018-06-06 NOTE — ASSESSMENT & PLAN NOTE
66yo M with PMH of mitral valve endocarditis and resultant severe MR, TR and pulmonary HTN who is now s/p IABP placement (5/24) and s/pMVR and CABG 5/25.     Plan:     Neuro:   - Mental Status significantly improved since admission to the SICU. Recommend continued delirium precautions.   - AAOx4 this AM.   - Pain control per CTS.     Pulmonary:   - Currently on RA.   - Continuous pulse ox.   - Supplement O2 if necessary to maintain sats at >92.   - ABGs/CXR if distressed.        Cardiac:  - MAP goal >65  - Paced at 80  - Home amlodipine, ASA, and statin daily.  - TTE revealed LA thrombus. Therefore no DCCV at this time. EPS thinks amelia function may return in time. Started theophyline.   - Coumadin therapy.  - Continuous cardiac monitoring.  - Pathology from valve returned with active subacute endocarditis. S/p ID consultation. PICC placed for long term abx administration.  Per ID Ampicillin and Ceftriaxone x 6 weeks for Enterococcal SBE (end date 7/13/18). In addition, patient will need ESR, CRP, CBC and CMP to be drawn weekly with results faxed to the ID Department at [phone] set up prior to discharge. Appreciate sw assistance.      Renal:   -UOP adequate.   -Bun/Cr stable.   -Lasix gtt off.   -Flomax daily given home use.  - MIVF.      Fluids/Electrolytes/Nutrition/GI:   - MIVF   - Na remains elevated currently on free water boluses and D5 gtt.   - s/p barium swallow. Recommendations continued NPO status w/ puree/honey tick liquids only for pleasure w/ 2-3 adequate swallows per food bolus if there are no signs or symptoms of aspiration. TPN was started by primary team 2/2 concerns over aspiration and inadequate nutrition on 6/4/18.   - Replace lytes PRN.  - Bowel regimen.     Hematology/Oncology:  - Hemoglobin < 7 today. Transfusion per primary team.  - No overt signs of bleeding or hemolysis. Will continue to monitor.   - INR daily.  - CBC daily.     Infectious Disease:   -Afebrile  -WBC wnl  -CBC daily    -Pathology from valve with active endocarditis, ID consulted, recommend Ampicillin and Ceftriaxone x 6 weeks for Enterococcal SBE (end date 7/13/18). In addition, patient will need ESR, CRP, CBC and CMP to be drawn weekly with results faxed to the ID Department at [phone] set up prior to discharge. Appreciate sw assistance.      Endocrine:  - No h/o DM or thyroid dysfunction  - s/p Endocrine evaluation. Continue following recs.   - Glucose goal of 120-180     Dispo:  - CTS primary. Step down orders in. Awaiting bed.

## 2018-06-06 NOTE — PROGRESS NOTES
"[facility]  Endocrinology  Progress Note    Admit Date: 5/23/2018     Reason for Consult: Management of  Hyperglycemia     Surgical Procedure and Date: s/p CABG x 1, MVR  5/25/18    HPI: Patient is a 67 y.o. male with a diagnosis of mitral valve endocarditis and resultant severe MR, TR and pulmonary HTN. The etiology of his endocarditis remains unknown. He was admitted to the SICU pre-operatively for IABP placement 5/23/18. Underwent cardiac surgery 5/25/18.   No personal or family hx of DM  Lab Results   Component Value Date    HGBA1C 5.4 02/22/2018     Endocrine consulted for BG mgmt.             Interval HPI:   Overnight events: transferred to CTSU this afternoon. BG elevated overnight but trending down to goal with increased novolog dose and decrease in TPN rate. Plan for GJ tube placement.    Eating:   NPO  Nausea: No  Hypoglycemia and intervention: No  Fever: No  TPN : Yes at 50 cc/hr     BP (!) 151/62   Pulse 78   Temp 98.1 °F (36.7 °C) (Oral)   Resp (!) 23   Ht 5' 10" (1.778 m)   Wt 77.1 kg (169 lb 15.6 oz)   SpO2 98%   BMI 24.39 kg/m²       Labs Reviewed and Include      Recent Labs  Lab 06/06/18  0355   *   CALCIUM 8.9   ALBUMIN 2.3*   PROT 5.8*   *   K 3.4*   CO2 26   *   BUN 74*   CREATININE 1.6*   ALKPHOS 92   ALT 30   AST 19   BILITOT 0.5     Lab Results   Component Value Date    WBC 10.17 06/06/2018    HGB 6.7 (L) 06/06/2018    HCT 21.1 (L) 06/06/2018    MCV 97 06/06/2018     (L) 06/06/2018     No results for input(s): TSH, FREET4 in the last 168 hours.  Lab Results   Component Value Date    HGBA1C 5.4 02/22/2018       Nutritional status:   Body mass index is 24.39 kg/m².  Lab Results   Component Value Date    ALBUMIN 2.3 (L) 06/06/2018    ALBUMIN 2.3 (L) 06/05/2018    ALBUMIN 2.5 (L) 06/04/2018     Lab Results   Component Value Date    PREALBUMIN 20 06/06/2018    PREALBUMIN 12 (L) 02/22/2018       Estimated Creatinine Clearance: 46.3 mL/min (A) " (based on SCr of 1.6 mg/dL (H)).    Accu-Checks  Recent Labs      06/04/18   0750  06/04/18   1143  06/04/18   1613  06/04/18   2102  06/05/18   0313  06/05/18   0747  06/05/18   1204  06/05/18   1607  06/05/18 2011 06/06/18   0029   POCTGLUCOSE  137*  144*  145*  135*  202*  238*  233*  235*  250*  170*       Current Medications and/or Treatments Impacting Glycemic Control  Immunotherapy:  Immunosuppressants     None        Steroids:   Hormones     None        Pressors:    Autonomic Drugs     None        Hyperglycemia/Diabetes Medications: Antihyperglycemics     Start     Stop Route Frequency Ordered    06/06/18 0800  insulin aspart U-100 pen 6 Units      -- SubQ Every 24 hours (non-standard times) 06/05/18 1049    06/06/18 0400  insulin aspart U-100 pen 6 Units      -- SubQ Every 24 hours (non-standard times) 06/05/18 1049    06/06/18 0000  insulin aspart U-100 pen 6 Units      -- SubQ Every 24 hours (non-standard times) 06/05/18 1049    06/05/18 2000  insulin aspart U-100 pen 6 Units      -- SubQ Every 24 hours (non-standard times) 06/05/18 1049    06/05/18 1600  insulin aspart U-100 pen 6 Units      -- SubQ Every 24 hours (non-standard times) 06/05/18 1049    06/05/18 1200  insulin aspart U-100 pen 6 Units      -- SubQ Every 24 hours (non-standard times) 06/05/18 1049    05/30/18 1703  insulin aspart U-100 pen 0-5 Units      -- SubQ Every 4 hours PRN 05/30/18 1705          ASSESSMENT and PLAN    * S/P MVR (mitral valve replacement)    Per CTS  avoid hypoglycemia          Acute hyperglycemia    BG goal 140-180 mg/dl    Continue Novolog 6 units Q4hr with TPN  Continue bg monitoring q4 hours and low dose correction scale  Hold if TPN suspended/discontinued.     Please page endocrine NP on call with any BG related issues or concerns.       Discharge plans-TBD             Coronary artery disease of native artery of native heart with stable angina pectoris    Per CTS  avoid hypoglycemia          S/P CABG x 1    Per  CTS  avoid hypoglycemia          Chronic systolic congestive heart failure    Per CTS  avoid hypoglycemia            Sequelae of hyperalimentation    May increase insulin needs.           ANNA (acute kidney injury)    Estimated Creatinine Clearance: 46.3 mL/min (A) (based on SCr of 1.6 mg/dL (H)).  avoid hypoglycemia  Caution with insulin stacking                Lynn Canseco NP  Endocrinology  [facility]

## 2018-06-06 NOTE — PROGRESS NOTES
Dr. Perrin notified of pt's H/H 6.7/21.1, K 3.4, and Na 151.  MD to put orders in.  Will continue to monitor and carry out orders once received.

## 2018-06-06 NOTE — SUBJECTIVE & OBJECTIVE
Interval History/Significant Events:     Mr. Messina had no acute overnight events. He was resting comfortably this morning and in no acute distress. Patient remains on RA w/ sats at >95%, afebrile w/ stable vital signs, on TPN for nutrition, D5 gtt/free water pushes for elevated Na, and he continues to make adequate urine output.     Follow-up For: Procedure(s) (LRB):  Mitral Valve Replacement (N/A)  AORTOCORONARY BYPASS-CABG (N/A)    Post-Operative Day: 12 Days Post-Op    Objective:     Vital Signs (Most Recent):  Temp: 97.6 °F (36.4 °C) (06/06/18 0300)  Pulse: 69 (06/06/18 0545)  Resp: 15 (06/06/18 0545)  BP: (!) 117/56 (06/06/18 0530)  SpO2: 100 % (06/06/18 0545) Vital Signs (24h Range):  Temp:  [97.6 °F (36.4 °C)-97.9 °F (36.6 °C)] 97.6 °F (36.4 °C)  Pulse:  [68-97] 69  Resp:  [14-46] 15  SpO2:  [97 %-100 %] 100 %  BP: ()/(52-81) 117/56     Weight: 77.1 kg (169 lb 15.6 oz)  Body mass index is 24.39 kg/m².      Intake/Output Summary (Last 24 hours) at 06/06/18 0559  Last data filed at 06/06/18 0500   Gross per 24 hour   Intake             1403 ml   Output             1685 ml   Net             -282 ml       Physical Exam   Constitutional: He is oriented to person, place, and time. He appears well-developed and well-nourished. No distress.   HENT:   Head: Normocephalic and atraumatic.   NG tube in place   Eyes: Conjunctivae are normal. No scleral icterus.   Cardiovascular: Normal rate.    Murmur heard.  Sternal incision c/d/i. No wound dehiscence. No purulent drainage, surrounding erythema, warmth or induration   Pulmonary/Chest: Effort normal. No respiratory distress.   Abdominal: Soft. He exhibits no distension. There is no tenderness.   Genitourinary:   Genitourinary Comments: Lainez catheter in place with clear yellow urine in bag   Neurological: He is alert and oriented to person, place, and time.   Skin: Skin is warm and dry. No rash noted. He is not diaphoretic.   Psychiatric: He has a normal mood and  affect. His behavior is normal.       Vents:  Vent Mode: Spont (05/26/18 2025)  Ventilator Initiated: Yes (05/25/18 1509)  Set Rate: 0 bmp (05/26/18 2025)  Vt Set: 500 mL (05/26/18 2025)  Pressure Support: 10 cmH20 (05/26/18 2025)  PEEP/CPAP: 5 cmH20 (05/26/18 2025)  Oxygen Concentration (%): 24 (05/29/18 0402)  Peak Airway Pressure: 16 cmH2O (05/26/18 2025)  Plateau Pressure: 0 cmH20 (05/26/18 2025)  Total Ve: 11.8 mL (05/26/18 2025)  F/VT Ratio<105 (RSBI): (!) 32.76 (05/26/18 2025)    Lines/Drains/Airways     Peripherally Inserted Central Catheter Line                 PICC Double Lumen 03/05/18 1539 right basilic 92 days         PICC Double Lumen 06/04/18 1654 left brachial 1 day          Central Venous Catheter Line                 Percutaneous Central Line Insertion/Assessment - Quad lumen  05/25/18 0800 left subclavian 11 days          Drain                 Urethral Catheter 06/06/18 0030 less than 1 day          Line                 Pacer Wires 05/25/18 1355 11 days          Peripheral Intravenous Line                 Peripheral IV - Single Lumen 06/05/18 1300 Right Hand less than 1 day                Significant Labs:    CBC/Anemia Profile:    Recent Labs  Lab 06/05/18  0300 06/05/18  1013 06/06/18  0355   WBC 8.11 16.95* 10.17   HGB 5.8* 7.2* 6.7*   HCT 19.0* 22.9* 21.1*   * 187 148*   MCV 99* 97 97   RDW 16.4* 16.5* 17.2*        Chemistries:    Recent Labs  Lab 06/05/18  0300 06/05/18  0750 06/05/18  2008 06/06/18  0355   *  --   --  151*   K 3.7  --   --  3.4*   *  --   --  115*   CO2 27  --   --  26   BUN 75*  --   --  74*   CREATININE 1.6*  --   --  1.6*   CALCIUM 8.9  --   --  8.9   ALBUMIN 2.3*  --   --  2.3*   PROT 5.5*  --   --  5.8*   BILITOT 0.4  --   --  0.5   ALKPHOS 94  --   --  92   ALT 38  --   --  30   AST 31  --   --  19   MG 2.3 2.7* 2.8*  --    PHOS 3.8  --   --  3.8

## 2018-06-06 NOTE — SUBJECTIVE & OBJECTIVE
"Interval HPI:   Overnight events: transferred to CTSU this afternoon. BG elevated overnight but trending down to goal with increased novolog dose and decrease in TPN rate. Plan for GJ tube placement.    Eating:   NPO  Nausea: No  Hypoglycemia and intervention: No  Fever: No  TPN : Yes at 50 cc/hr     BP (!) 151/62   Pulse 78   Temp 98.1 °F (36.7 °C) (Oral)   Resp (!) 23   Ht 5' 10" (1.778 m)   Wt 77.1 kg (169 lb 15.6 oz)   SpO2 98%   BMI 24.39 kg/m²     Labs Reviewed and Include      Recent Labs  Lab 06/06/18  0355   *   CALCIUM 8.9   ALBUMIN 2.3*   PROT 5.8*   *   K 3.4*   CO2 26   *   BUN 74*   CREATININE 1.6*   ALKPHOS 92   ALT 30   AST 19   BILITOT 0.5     Lab Results   Component Value Date    WBC 10.17 06/06/2018    HGB 6.7 (L) 06/06/2018    HCT 21.1 (L) 06/06/2018    MCV 97 06/06/2018     (L) 06/06/2018     No results for input(s): TSH, FREET4 in the last 168 hours.  Lab Results   Component Value Date    HGBA1C 5.4 02/22/2018       Nutritional status:   Body mass index is 24.39 kg/m².  Lab Results   Component Value Date    ALBUMIN 2.3 (L) 06/06/2018    ALBUMIN 2.3 (L) 06/05/2018    ALBUMIN 2.5 (L) 06/04/2018     Lab Results   Component Value Date    PREALBUMIN 20 06/06/2018    PREALBUMIN 12 (L) 02/22/2018       Estimated Creatinine Clearance: 46.3 mL/min (A) (based on SCr of 1.6 mg/dL (H)).    Accu-Checks  Recent Labs      06/04/18   0750  06/04/18   1143  06/04/18   1613  06/04/18   2102  06/05/18   0313  06/05/18   0747  06/05/18   1204  06/05/18   1607  06/05/18 2011 06/06/18   0029   POCTGLUCOSE  137*  144*  145*  135*  202*  238*  233*  235*  250*  170*       Current Medications and/or Treatments Impacting Glycemic Control  Immunotherapy:  Immunosuppressants     None        Steroids:   Hormones     None        Pressors:    Autonomic Drugs     None        Hyperglycemia/Diabetes Medications: Antihyperglycemics     Start     Stop Route Frequency Ordered    06/06/18 0800  " insulin aspart U-100 pen 6 Units      -- SubQ Every 24 hours (non-standard times) 06/05/18 1049    06/06/18 0400  insulin aspart U-100 pen 6 Units      -- SubQ Every 24 hours (non-standard times) 06/05/18 1049    06/06/18 0000  insulin aspart U-100 pen 6 Units      -- SubQ Every 24 hours (non-standard times) 06/05/18 1049    06/05/18 2000  insulin aspart U-100 pen 6 Units      -- SubQ Every 24 hours (non-standard times) 06/05/18 1049    06/05/18 1600  insulin aspart U-100 pen 6 Units      -- SubQ Every 24 hours (non-standard times) 06/05/18 1049    06/05/18 1200  insulin aspart U-100 pen 6 Units      -- SubQ Every 24 hours (non-standard times) 06/05/18 1049    05/30/18 1703  insulin aspart U-100 pen 0-5 Units      -- SubQ Every 4 hours PRN 05/30/18 1703

## 2018-06-06 NOTE — PLAN OF CARE
Problem: SLP Goal  Goal: SLP Goal  Speech Language Pathology Goals  Goals expected to be met by 6/11  1. Pt will tolerate puree & honey thickened liquids for pleasure while completing 2-3 swallows per bolus w/o overt S/S aspiration, MIN A   3. Educate Pt and family on safe swallow strategies and S/S aspiration   4. Continue to assess swallow to determine feasibility of PO upgrade   5. Pt will complete dysphagia exercises given min A      Speech Language Pathology Goals  Goals expected to be met by 6/7:  1. Pt will tolerate trials of puree w/o overt S/S aspiration, MIN A - CONTINUE  2. Pt will tolerate trials of nectar-thickened liquids w/o overt S/S aspiration, MIN A - DISCONTINUE  3. Educate Pt and family on safe swallow strategies and S/S aspiration - CONTINUE  4. Continue to assess swallow to determine feasibility of PO upgrade - CONTINUE      Goals expected to be met by 6/4: goals not met 2/2 transfer to ICU 5/30  1. Pt will participate in ongoing swallowing assessment to determine safest and least restrictive diet.          Outcome: Ongoing (interventions implemented as appropriate)  Pt participated well w/ tx tasks.  Pt cont w/ poor tolerance of pleasure feeding of puree and honey thick liquids despite use of compensatory strategies d/t poor endurance (fatigue impacting swallow each subsequent bite/sip and ability to generate multiple swallows per bite/sip).  REC:  Pt cont npo w/ consideration for long term alternative means of nutrition w/ pleasure feeding sparingly.  Cont ST per POC.    Leyda Chopra CCC-SLP  6/6/2018

## 2018-06-06 NOTE — SUBJECTIVE & OBJECTIVE
Interval History: No acute events overnight. Doing well this am.    Medications:  Continuous Infusions:   dextrose 5 % 50 mL/hr at 06/06/18 1100    TPN ADULT CENTRAL LINE CUSTOM 50 mL/hr at 06/06/18 1100    TPN ADULT CENTRAL LINE CUSTOM       Scheduled Meds:   amLODIPine  10 mg Per NG tube Daily    ampicillin IVPB  2 g Intravenous Q6H    aspirin  324 mg Per NG tube Daily    atorvastatin  40 mg Per NG tube Daily    cefTRIAXone (ROCEPHIN) IVPB  2 g Intravenous Q12H    doxazosin  4 mg Per NG tube Daily    famotidine (PF)  20 mg Intravenous BID    fat emulsion 20%  250 mL Intravenous Daily    hydrALAZINE  25 mg Oral Q8H    insulin aspart U-100  6 Units Subcutaneous Q24H    insulin aspart U-100  6 Units Subcutaneous Q24H    insulin aspart U-100  6 Units Subcutaneous Q24H    insulin aspart U-100  6 Units Subcutaneous Q24H    insulin aspart U-100  6 Units Subcutaneous Q24H    insulin aspart U-100  6 Units Subcutaneous Q24H    mirtazapine  7.5 mg Per NG tube QHS    polyethylene glycol  17 g Per NG tube Daily    ramelteon  8 mg Oral QHS    sodium chloride 0.9%  10 mL Intravenous Q6H    sodium chloride 0.9%  3 mL Intravenous Q8H     PRN Meds:sodium chloride, albumin human 5%, albuterol-ipratropium, bisacodyl, dextrose 50%, glucagon (human recombinant), hydrALAZINE, insulin aspart U-100, lactated ringers, metoclopramide HCl, ondansetron, oxyCODONE, Flushing PICC Protocol **AND** sodium chloride 0.9% **AND** sodium chloride 0.9%, white petrolatum-mineral oil     Objective:     Vital Signs (Most Recent):  Temp: 98.1 °F (36.7 °C) (06/06/18 0700)  Pulse: 76 (06/06/18 1100)  Resp: (!) 21 (06/06/18 1100)  BP: 123/60 (06/06/18 1100)  SpO2: 97 % (06/06/18 1100) Vital Signs (24h Range):  Temp:  [97.6 °F (36.4 °C)-98.1 °F (36.7 °C)] 98.1 °F (36.7 °C)  Pulse:  [68-97] 76  Resp:  [14-38] 21  SpO2:  [89 %-100 %] 97 %  BP: ()/(52-73) 123/60     Weight: 77.1 kg (169 lb 15.6 oz)  Body mass index is 24.39  kg/m².    SpO2: 97 %  O2 Device (Oxygen Therapy): room air    Intake/Output - Last 3 Shifts       06/04 0700 - 06/05 0659 06/05 0700 - 06/06 0659 06/06 0700 - 06/07 0659    P.O.  120     I.V. (mL/kg) 627 (8.2) 1503 (19.5)     Blood 222      NG/       1527 67    Total Intake(mL/kg) 1718 (22.5) 3150 (40.9) 67 (0.9)    Urine (mL/kg/hr) 2165 (1.2) 1525 (0.8) 315 (0.8)    Total Output 2165 1525 315    Net -447 +1625 -248                 Lines/Drains/Airways     Peripherally Inserted Central Catheter Line                 PICC Double Lumen 03/05/18 1539 right basilic 92 days         PICC Double Lumen 06/04/18 1654 left brachial 1 day          Central Venous Catheter Line                 Percutaneous Central Line Insertion/Assessment - Quad lumen  05/25/18 0800 left subclavian 12 days          Drain                 Urethral Catheter 06/06/18 0030 less than 1 day          Line                 Pacer Wires 05/25/18 1355 11 days          Peripheral Intravenous Line                 Peripheral IV - Single Lumen 06/05/18 1300 Right Hand less than 1 day                Physical Exam   Constitutional: He appears well-developed.   Cardiovascular: Normal rate and regular rhythm.    Incision healing well   Pulmonary/Chest: Effort normal.   Neurological: He is alert.   Skin: Skin is warm and dry.       Significant Labs:  CBC:   Recent Labs  Lab 06/06/18  0355   WBC 10.17   RBC 2.18*   HGB 6.7*   HCT 21.1*   *   MCV 97   MCH 30.7   MCHC 31.8*     CMP:   Recent Labs  Lab 06/06/18  0355   *   CALCIUM 8.9   ALBUMIN 2.3*   PROT 5.8*   *   K 3.4*   CO2 26   *   BUN 74*   CREATININE 1.6*   ALKPHOS 92   ALT 30   AST 19   BILITOT 0.5     Coagulation:   Recent Labs  Lab 06/06/18  0355   INR 2.2*   APTT 36.2*

## 2018-06-06 NOTE — PT/OT/SLP PROGRESS
"Speech Language Pathology Treatment    Patient Name:  Rodolfo Messina   MRN:  [id number]  Admitting Diagnosis: S/P MVR (mitral valve replacement)    Recommendations:                 General Recommendations:  Dysphagia therapy  Diet recommendations:  NPO, Liquid Diet Level: NPO   Aspiration Precautions: Consideration for alternative means of nutrition/hydration and Strict aspiration precautions   General Precautions: Standard, aspiration, fall, NPO, sternal  Communication strategies:  none    Subjective     "Can I ask you one thing?  Can I have water?"  Pt asked SLP  Patient goals: to drink water     Pain/Comfort:  · Pain Rating 1: 0/10  · Pain Rating Post-Intervention 1: 0/10    Objective:     Has the patient been evaluated by SLP for swallowing?   Yes  Keep patient NPO? Yes   Current Respiratory Status: room air      Pt was seated upright in a bedside chair.  He and his spouse reported completing dysphagia ex's per handout.  Pt completed basic dysphagia ex's x5 reps for tongue base retraction w/ glottal /g, k/, hyolaryngeal elevation w/ falsetto /i/, and efforftul swallow given mod cues w/ fair effort and ability.  He was unable to complete willie maneuver despite max cues.  He completed chin tuck against resistance ex's x5 reps.  Education was provided to pt and spouse re: normal swallow mechanics, purpose of ex;s and dysphagia ex home ex program.  They indicated good understanding.    Pt was offered and accepted po trials of honey thick liquids and purees for ongoing assessment of tolerance of pleasure feeding.  He exhibited delayed coughing and throat clearing following 2/3 tsp sips of honey despite using cued multiple swallows-3 per sip.  He also exhibited delayed coughing following 2/2 puree trials w/ significant difficulty noted as trials progressed w/ generation of multiple swallows.  Education was provided to pt and spouse re: need for pleasure feeding to be sparing and only if pt can generate multiple " effortful swallows.  They indicated good understanding.  Severe oropharyngeal dysphagia persists.    Assessment:     Rodolfo Messina is a 67 y.o. male with an SLP diagnosis of Dysphagia.  He presents with persistent severe oropharyngeal dysphagia at this time.    Goals:    SLP Goals        Problem: SLP Goal    Goal Priority Disciplines Outcome   SLP Goal     SLP Ongoing (interventions implemented as appropriate)   Description:  Speech Language Pathology Goals  Goals expected to be met by 6/11  1. Pt will tolerate puree & honey thickened liquids for pleasure while completing 2-3 swallows per bolus w/o overt S/S aspiration, MIN A   3. Educate Pt and family on safe swallow strategies and S/S aspiration   4. Continue to assess swallow to determine feasibility of PO upgrade   5. Pt will complete dysphagia exercises given min A      Speech Language Pathology Goals  Goals expected to be met by 6/7:  1. Pt will tolerate trials of puree w/o overt S/S aspiration, MIN A - CONTINUE  2. Pt will tolerate trials of nectar-thickened liquids w/o overt S/S aspiration, MIN A - DISCONTINUE  3. Educate Pt and family on safe swallow strategies and S/S aspiration - CONTINUE  4. Continue to assess swallow to determine feasibility of PO upgrade - CONTINUE      Goals expected to be met by 6/4: goals not met 2/2 transfer to ICU 5/30  1. Pt will participate in ongoing swallowing assessment to determine safest and least restrictive diet.                           Plan:     · Patient to be seen:  5 x/week   · Plan of Care expires:  06/30/18  · Plan of Care reviewed with:  patient, spouse   · SLP Follow-Up:  Yes       Discharge recommendations:  rehabilitation facility   Barriers to Discharge:  None    Time Tracking:     SLP Treatment Date:   06/06/18  Speech Start Time:  0930  Speech Stop Time:  1021     Speech Total Time (min):  51 min    Billable Minutes: Treatment Swallowing Dysfunction 31 and Seld Care/Home Management Training 20    Leyda  DENAE Chopra CCC-SLP  06/06/2018

## 2018-06-06 NOTE — PROGRESS NOTES
Dr. Perrin notified that pt still unable to void since removing lackey this afternoon.  Bladder scan revealed >200mL, although patient reports no urge to urinate.  MD advised to wait a few hours and repeat the scan.  If pt has still not urinated by midnight, RN will repeat scan at that time.

## 2018-06-06 NOTE — ASSESSMENT & PLAN NOTE
BG goal 140-180 mg/dl    Continue Novolog 6 units Q4hr with TPN  Continue bg monitoring q4 hours and low dose correction scale  Hold if TPN suspended/discontinued.     Please page endocrine NP on call with any BG related issues or concerns.       Discharge plans-TBD

## 2018-06-06 NOTE — PLAN OF CARE
Problem: Patient Care Overview  Goal: Plan of Care Review  Outcome: Ongoing (interventions implemented as appropriate)  Pt on RA, sats 100%  Afebrile, all VSS  TPN infusing for nutrition  D5 gtt infusing for elevated Na  Lainez reinserted for urinary retention, pt voided >700mL clear yellow urine since insertion  BM x1  Accuchecks q4, scheduled insulin and coverage insulin provided  Pt AAOx4, following commands, free from falls/injury over shift  Step-down orders in place  Plan of care reviewed with pt and wife, all questions answered  No acute events, will continue to monitor

## 2018-06-06 NOTE — PT/OT/SLP PROGRESS
Physical Therapy Treatment    Patient Name:  Rodolfo Messina   MRN:  [id number]  Co-treat with OT    Recommendations:     Discharge Recommendations:  rehabilitation facility   Discharge Equipment Recommendations: none   Barriers to discharge: None    Assessment:     Rodolfo Messina is a 67 y.o. male admitted with a medical diagnosis of S/P MVR (mitral valve replacement).  He presents with the following impairments/functional limitations:  weakness, impaired endurance, impaired self care skills, impaired functional mobilty, gait instability, impaired balance, impaired cognition, decreased safety awareness. Pt progressing towards goals, but not at PLOF. Pt tolerated session well but required increased rest breaks during ambulation.  Pt is improving with therapy evidenced by increased gait distance.  While pt is progressing, pt is far from PLOF. Pt can tolerate and requires 3 hours of intensive therapy to return to PLOF. Recommend d/c to Rehab to maximize functional independence and ensure safety.       Rehab Prognosis:  good; patient would benefit from acute skilled PT services to address these deficits and reach maximum level of function.      Recent Surgery: Procedure(s) (LRB):  Mitral Valve Replacement (N/A)  AORTOCORONARY BYPASS-CABG (N/A) 11 Days Post-Op    Plan:     During this hospitalization, patient to be seen 5 x/week to address the above listed problems via therapeutic activities, therapeutic exercises, gait training, neuromuscular re-education  · Plan of Care Expires:  06/30/18   Plan of Care Reviewed with: patient    Subjective     Communicated with RN prior to session and wife present in room.  Patient found in bed upon PT entry to room, agreeable to treatment.      Chief Complaint: weakness  Patient comments/goals: to get better and return home   Pain/Comfort:  · Pain Rating 1: 0/10  · Pain Rating Post-Intervention 1: 0/10    Patients cultural, spiritual, Restoration conflicts given the current  situation: none reported     Objective:     Patient found with: telemetry, pulse ox (continuous), blood pressure cuff, PICC line, lackey catheter     General Precautions: Standard, fall, sternal   Orthopedic Precautions:N/A   Braces: N/A     Functional Mobility:  · Bed Mobility:     · Rolling Right: minimum assistance  · Scooting: minimum assistance  · Supine to Sit: minimum assistance  · Transfers:     · Sit to Stand:  contact guard assistance with no AD x 5 trials (EOB, chair)  · Gait:   · ~10ft with CGA to sink  · Standing at sink to perform ADL's  · ~86ft with CGA with 2 standing rest breaks  · Seated rest break   · ~20ft with CGA  · Pt used IV pole for stability at times  · Pt c/o SOB and weakness during ambulation; VSS  · decreased vitaly, decreased step length       AM-PAC 6 CLICK MOBILITY  Turning over in bed (including adjusting bedclothes, sheets and blankets)?: 3  Sitting down on and standing up from a chair with arms (e.g., wheelchair, bedside commode, etc.): 3  Moving from lying on back to sitting on the side of the bed?: 3  Moving to and from a bed to a chair (including a wheelchair)?: 3  Need to walk in hospital room?: 3  Climbing 3-5 steps with a railing?: 2  Total Score: 17       Therapeutic Activities and Exercises:  Educated pt on PT POC  Educated pt on safety with functional mobility  Pt verbalized understanding    T/f to chair to increase tolerance to OOB activity and to create optimal positioning for lung expansion     Patient left up in chair with all lines intact, call button in reach, RN notified and wife present..    GOALS:    Physical Therapy Goals        Problem: Physical Therapy Goal    Goal Priority Disciplines Outcome Goal Variances Interventions   Physical Therapy Goal     PT/OT, PT Ongoing (interventions implemented as appropriate)     Description:  Goals to be met by: 6/15/2018    Patient will increase functional independence with mobility by performin. Supine to sit with  Supervision - not met  2. Sit to stand transfer with Supervision - not met  3. Bed to chair transfer with Supervision  - not met  4. Gait  x 250 feet with Supervision - not met  5. Lower extremity exercise program x20 reps per handout, with independence - met 6/2                        Time Tracking:     PT Received On: 06/05/18  PT Start Time: 0927     PT Stop Time: 0956  PT Total Time (min): 29 min     Billable Minutes: Gait Training 23    Treatment Type: Treatment  PT/PTA: PT           Eneida Berrios PT, DPT  6/5/2018  [phone]

## 2018-06-06 NOTE — PLAN OF CARE
Problem: Physical Therapy Goal  Goal: Physical Therapy Goal  Goals to be met by: 6/15/2018    Patient will increase functional independence with mobility by performin. Supine to sit with Supervision - not met  2. Sit to stand transfer with Supervision - not met  3. Bed to chair transfer with Supervision  - not met  4. Gait  x 250 feet with Supervision - not met  5. Lower extremity exercise program x20 reps per handout, with independence - met        Goals still appropriate at this time. Lorena Padgett, SPT 2018

## 2018-06-06 NOTE — PROGRESS NOTES
" [facility]  Adult Nutrition  Progress Note    SUMMARY       Recommendations  Recommendation/Intervention:   1. Continue current TPN + lipids - meeting EEN and EPN.   2. If able to start TF, recommend Isosource 1.5 at a goal rate of 50 mL/hr + 2 packets/day Beneprotein - to provide 1875 kcal/day, 94 g protein/day, and 917 mL free fluid/day.   3. As medically able, ADAT to Cardiac with texutre per SLP.   RD to monitor.    Goals: Patient to receive nutrition by RD follow-up  Nutrition Goal Status: goal met  Communication of RD Recs:  (POC)    Reason for Assessment  Reason for Assessment: RD follow-up  Diagnosis: cardiac disease (s/p MVR & CABG 5/25)  Relevant Medical History: mitral valve endocarditis, severe MR, pulmonary HTN, CHF, diverticulosis  General Information Comments: SLP still recommending NPO status. Patient started on TPN.  Nutrition Discharge Planning: Unable to determine at this time.    Nutrition Risk Screen  Nutrition Risk Screen: no indicators present    Nutrition/Diet History  Do you have any cultural, spiritual, Episcopal conflicts, given your current situation?: none  Factors Affecting Nutritional Intake: NPO, difficulty/impaired swallowing    Anthropometrics  Temp: 98.1 °F (36.7 °C)  Height Method: Stated  Height: 5' 10" (177.8 cm)  Height (inches): 70 in  Weight Method: Bed Scale  Weight: 77.1 kg (169 lb 15.6 oz)  Weight (lb): 169.98 lb  Ideal Body Weight (IBW), Male: 166 lb  % Ideal Body Weight, Male (lb): 96.82 lb  BMI (Calculated): 23.1  BMI Grade: 18.5-24.9 - normal    Lab/Procedures/Meds  Pertinent Labs Reviewed: reviewed  Pertinent Labs Comments: Na 151, K 3.4, Cl 115, BUN 74, Creat 1.6, Glu 182, Mag 2.8, Alb 2.3  Pertinent Medications Reviewed: reviewed  Pertinent Medications Comments: famotidine, insulin, IVF    Physical Findings/Assessment  Overall Physical Appearance: on oxygen therapy  Tubes:  (none)  Oral/Mouth Cavity: tooth/teeth missing  Skin: " incision(s)    Estimated/Assessed Needs  Weight Used For Calorie Calculations: 65.4 kg (144 lb 2.9 oz) (dosing weight)  Energy Calorie Requirements (kcal): 1794 kcal/day  Energy Need Method: Peralta-St Jeor (x 1.25)  Protein Requirements: 79-92 g/day (1.2-1.4 g/kg)  Weight Used For Protein Calculations: 65.4 kg (144 lb 2.9 oz) (dosing weight)  Fluid Requirements (mL): 1 mL/kcal or per MD  Fluid Need Method: RDA Method  RDA Method (mL): 1794    Nutrition Prescription Ordered  Current Diet Order: NPO  Current Nutrition Support Formula Ordered:  (Custom TPN 8% AA/25% Dextrose + lipids)  Current Nutrition Support Rate Ordered: 50 (ml)  Current Nutrition Support Frequency Ordered: mL/hr    Evaluation of Received Nutrient/Fluid Intake  Parenteral Calories (kcal): 1404  Parenteral Protein (gm): 96  Parenteral Fluid (mL): 1200  Lipid Calories (kcals): 500 kcals  GIR (Glucose Infusion Rate) (mg/kg/min): 3.19 mg/kg/min  Total Calories (kcal): 1904  % Kcal Needs: 106  % Protein Needs: 104  I/O: +1.6L x 24hrs, +1.9L since admit, good UOP  Energy Calories Required: meeting needs  Protein Required: meeting needs  Fluid Required:  (per MD)  Comments: LBM 6/5  Tolerance: tolerating  % Intake of Estimated Energy Needs: 75 - 100 %  % Meal Intake: NPO    Nutrition Risk  Level of Risk/Frequency of Follow-up: moderate (1x/week)     Assessment and Plan  * S/P MVR (mitral valve replacement)    Contributing Nutrition Diagnosis  Inadequate energy intake    Related to (etiology):   Decreased ability to consume sufficient energy    Signs and Symptoms (as evidenced by):   NPO and no alternative means of nutrition at this time     Nutrition Diagnosis Status:   Resolved          Monitor and Evaluation  Food and Nutrient Intake: energy intake, food and beverage intake, parenteral nutrition intake  Food and Nutrient Adminstration: diet order, enteral and parenteral nutrition administration  Anthropometric Measurements: weight, weight change, body  mass index  Biochemical Data, Medical Tests and Procedures: electrolyte and renal panel, gastrointestinal profile, inflammatory profile  Nutrition-Focused Physical Findings: overall appearance     Nutrition Follow-Up  RD Follow-up?: Yes

## 2018-06-06 NOTE — PROGRESS NOTES
[facility]  Cardiothoracic Surgery  Progress Note    Patient Name: Rodolfo Messina  MRN: [id number]  Admission Date: 5/23/2018  Hospital Length of Stay: 14 days  Code Status: Full Code   Attending Physician: Marvin Go MD   Referring Provider: Marvin Go MD  Principal Problem:S/P MVR (mitral valve replacement)    Subjective:     Post-Op Info:  Procedure(s) (LRB):  Mitral Valve Replacement (N/A)  AORTOCORONARY BYPASS-CABG (N/A)   12 Days Post-Op     Interval History: No acute events overnight. Doing well this am.    Medications:  Continuous Infusions:   dextrose 5 % 50 mL/hr at 06/06/18 1100    TPN ADULT CENTRAL LINE CUSTOM 50 mL/hr at 06/06/18 1100    TPN ADULT CENTRAL LINE CUSTOM       Scheduled Meds:   amLODIPine  10 mg Per NG tube Daily    ampicillin IVPB  2 g Intravenous Q6H    aspirin  324 mg Per NG tube Daily    atorvastatin  40 mg Per NG tube Daily    cefTRIAXone (ROCEPHIN) IVPB  2 g Intravenous Q12H    doxazosin  4 mg Per NG tube Daily    famotidine (PF)  20 mg Intravenous BID    fat emulsion 20%  250 mL Intravenous Daily    hydrALAZINE  25 mg Oral Q8H    insulin aspart U-100  6 Units Subcutaneous Q24H    insulin aspart U-100  6 Units Subcutaneous Q24H    insulin aspart U-100  6 Units Subcutaneous Q24H    insulin aspart U-100  6 Units Subcutaneous Q24H    insulin aspart U-100  6 Units Subcutaneous Q24H    insulin aspart U-100  6 Units Subcutaneous Q24H    mirtazapine  7.5 mg Per NG tube QHS    polyethylene glycol  17 g Per NG tube Daily    ramelteon  8 mg Oral QHS    sodium chloride 0.9%  10 mL Intravenous Q6H    sodium chloride 0.9%  3 mL Intravenous Q8H     PRN Meds:sodium chloride, albumin human 5%, albuterol-ipratropium, bisacodyl, dextrose 50%, glucagon (human recombinant), hydrALAZINE, insulin aspart U-100, lactated ringers, metoclopramide HCl, ondansetron, oxyCODONE, Flushing PICC Protocol **AND** sodium chloride 0.9% **AND** sodium chloride 0.9%,  white petrolatum-mineral oil     Objective:     Vital Signs (Most Recent):  Temp: 98.1 °F (36.7 °C) (06/06/18 0700)  Pulse: 76 (06/06/18 1100)  Resp: (!) 21 (06/06/18 1100)  BP: 123/60 (06/06/18 1100)  SpO2: 97 % (06/06/18 1100) Vital Signs (24h Range):  Temp:  [97.6 °F (36.4 °C)-98.1 °F (36.7 °C)] 98.1 °F (36.7 °C)  Pulse:  [68-97] 76  Resp:  [14-38] 21  SpO2:  [89 %-100 %] 97 %  BP: ()/(52-73) 123/60     Weight: 77.1 kg (169 lb 15.6 oz)  Body mass index is 24.39 kg/m².    SpO2: 97 %  O2 Device (Oxygen Therapy): room air    Intake/Output - Last 3 Shifts       06/04 0700 - 06/05 0659 06/05 0700 - 06/06 0659 06/06 0700 - 06/07 0659    P.O.  120     I.V. (mL/kg) 627 (8.2) 1503 (19.5)     Blood 222      NG/       1527 67    Total Intake(mL/kg) 1718 (22.5) 3150 (40.9) 67 (0.9)    Urine (mL/kg/hr) 2165 (1.2) 1525 (0.8) 315 (0.8)    Total Output 2165 1525 315    Net -447 +1625 -248                 Lines/Drains/Airways     Peripherally Inserted Central Catheter Line                 PICC Double Lumen 03/05/18 1539 right basilic 92 days         PICC Double Lumen 06/04/18 1654 left brachial 1 day          Central Venous Catheter Line                 Percutaneous Central Line Insertion/Assessment - Quad lumen  05/25/18 0800 left subclavian 12 days          Drain                 Urethral Catheter 06/06/18 0030 less than 1 day          Line                 Pacer Wires 05/25/18 1355 11 days          Peripheral Intravenous Line                 Peripheral IV - Single Lumen 06/05/18 1300 Right Hand less than 1 day                Physical Exam   Constitutional: He appears well-developed.   Cardiovascular: Normal rate and regular rhythm.    Incision healing well   Pulmonary/Chest: Effort normal.   Neurological: He is alert.   Skin: Skin is warm and dry.       Significant Labs:  CBC:   Recent Labs  Lab 06/06/18  0355   WBC 10.17   RBC 2.18*   HGB 6.7*   HCT 21.1*   *   MCV 97   MCH 30.7   MCHC 31.8*     CMP:    Recent Labs  Lab 06/06/18  0355   *   CALCIUM 8.9   ALBUMIN 2.3*   PROT 5.8*   *   K 3.4*   CO2 26   *   BUN 74*   CREATININE 1.6*   ALKPHOS 92   ALT 30   AST 19   BILITOT 0.5     Coagulation:   Recent Labs  Lab 06/06/18  0355   INR 2.2*   APTT 36.2*     Assessment/Plan:     * S/P MVR (mitral valve replacement)    --Continue ASA, statin  --Sternal precautions  --PT/OT  --Ambulate x4   --Wean O2 as tolerated for O2 sat >92%  --monitor CXR    --Monitor electrolytes and replace prn  --hold Lasix  --D5W @50  -- Q6  --echo completed 5/30 to r/o tamponade- negative    --d/c lackey   --strict I&Os  --did not pass modified swallow; PICC and TPN  --consult IR for G-J tube placement; INR needs to be 1.5        DISPO: stepdown          Epistaxis    --Noted to have post traumatic left sided epistaxis following attempted NGT placement. On the time of evaluation patient was hemostatic. No active bleeding noted.     -Given that patient is hemostatic, no acute intervention   -Recommend minimize nasal trauma as best possible  -If requires oxygen, ideal if it is humidified via face tent and not nasal canula  -Bacitracin to bilateral nares nightly  -Afrin q6hrs PRN bleeding (only if ok from Cards prospective)  -Ocean nasal spray q6hrs while awake (starting in a couple days, allow stable clot to form for now)  -If bleeding restarts, hold pressure to fleshy portion of nose (10 mins) and lean forward  -For bleeding that does not stop, page resident  -Remainder of medical management per primary team        Atrial flutter    --EP following appreciate recs.   --holding BB  --external pacer set at 80        Chronic systolic congestive heart failure    - ASA, statin  - D/c beta blocker  - EP following; appreciate recs          ANNA (acute kidney injury)    --Cr bumped to 3.0  --Elevated but stable 1.6  --CMP q6h             Vishnu Hawkins MD  Cardiothoracic Surgery  [facility]

## 2018-06-06 NOTE — PLAN OF CARE
Problem: Occupational Therapy Goal  Goal: Occupational Therapy Goal  Goals to be met by: 6/10/18     Patient will increase functional independence with ADLs by performing:    Feeding with Modified [place].  UE Dressing with Supervision.  LE Dressing with Supervision.  Grooming while standing at sink with Supervision.  Toileting from toilet with Supervision for hygiene and clothing management.   Toilet transfer to toilet with Supervision.      Goals remain appropriate.

## 2018-06-07 PROBLEM — I25.118 CORONARY ARTERY DISEASE OF NATIVE ARTERY OF NATIVE HEART WITH STABLE ANGINA PECTORIS: Status: RESOLVED | Noted: 2018-05-22 | Resolved: 2018-06-07

## 2018-06-07 PROBLEM — I05.9 ENDOCARDITIS OF MITRAL VALVE: Status: RESOLVED | Noted: 2018-02-28 | Resolved: 2018-06-07

## 2018-06-07 LAB
ALBUMIN SERPL BCP-MCNC: 2.3 G/DL
ALP SERPL-CCNC: 91 U/L
ALT SERPL W/O P-5'-P-CCNC: 23 U/L
ANION GAP SERPL CALC-SCNC: 12 MMOL/L
APTT BLDCRRT: 31.2 SEC
APTT BLDCRRT: 35.4 SEC
AST SERPL-CCNC: 14 U/L
BASOPHILS # BLD AUTO: 0.02 K/UL
BASOPHILS NFR BLD: 0.2 %
BILIRUB SERPL-MCNC: 0.4 MG/DL
BUN SERPL-MCNC: 66 MG/DL
CALCIUM SERPL-MCNC: 8.9 MG/DL
CHLORIDE SERPL-SCNC: 114 MMOL/L
CO2 SERPL-SCNC: 22 MMOL/L
CREAT SERPL-MCNC: 1.7 MG/DL
DIFFERENTIAL METHOD: ABNORMAL
EOSINOPHIL # BLD AUTO: 0.3 K/UL
EOSINOPHIL NFR BLD: 4.2 %
ERYTHROCYTE [DISTWIDTH] IN BLOOD BY AUTOMATED COUNT: 17 %
EST. GFR  (AFRICAN AMERICAN): 47.2 ML/MIN/1.73 M^2
EST. GFR  (NON AFRICAN AMERICAN): 40.8 ML/MIN/1.73 M^2
GLUCOSE SERPL-MCNC: 164 MG/DL
HCT VFR BLD AUTO: 21.6 %
HGB BLD-MCNC: 6.8 G/DL
IMM GRANULOCYTES # BLD AUTO: 0.05 K/UL
IMM GRANULOCYTES NFR BLD AUTO: 0.6 %
INR PPP: 1.7
LYMPHOCYTES # BLD AUTO: 0.7 K/UL
LYMPHOCYTES NFR BLD: 8.9 %
MAGNESIUM SERPL-MCNC: 2.8 MG/DL
MAGNESIUM SERPL-MCNC: 3.1 MG/DL
MCH RBC QN AUTO: 31.1 PG
MCHC RBC AUTO-ENTMCNC: 31.5 G/DL
MCV RBC AUTO: 99 FL
MONOCYTES # BLD AUTO: 0.5 K/UL
MONOCYTES NFR BLD: 6.4 %
NEUTROPHILS # BLD AUTO: 6.4 K/UL
NEUTROPHILS NFR BLD: 79.7 %
NRBC BLD-RTO: 0 /100 WBC
PHOSPHATE SERPL-MCNC: 4.1 MG/DL
PLATELET # BLD AUTO: 169 K/UL
PMV BLD AUTO: 13 FL
POCT GLUCOSE: 150 MG/DL (ref 70–110)
POCT GLUCOSE: 156 MG/DL (ref 70–110)
POCT GLUCOSE: 168 MG/DL (ref 70–110)
POCT GLUCOSE: 206 MG/DL (ref 70–110)
POCT GLUCOSE: 222 MG/DL (ref 70–110)
POTASSIUM SERPL-SCNC: 3.5 MMOL/L
POTASSIUM SERPL-SCNC: 3.8 MMOL/L
PROT SERPL-MCNC: 5.9 G/DL
PROTHROMBIN TIME: 16.6 SEC
RBC # BLD AUTO: 2.19 M/UL
SODIUM SERPL-SCNC: 148 MMOL/L
WBC # BLD AUTO: 8.01 K/UL

## 2018-06-07 PROCEDURE — 80053 COMPREHEN METABOLIC PANEL: CPT

## 2018-06-07 PROCEDURE — 97535 SELF CARE MNGMENT TRAINING: CPT

## 2018-06-07 PROCEDURE — 63600175 PHARM REV CODE 636 W HCPCS: Performed by: STUDENT IN AN ORGANIZED HEALTH CARE EDUCATION/TRAINING PROGRAM

## 2018-06-07 PROCEDURE — 20600001 HC STEP DOWN PRIVATE ROOM

## 2018-06-07 PROCEDURE — 25000003 PHARM REV CODE 250: Performed by: NURSE PRACTITIONER

## 2018-06-07 PROCEDURE — 25000003 PHARM REV CODE 250: Performed by: STUDENT IN AN ORGANIZED HEALTH CARE EDUCATION/TRAINING PROGRAM

## 2018-06-07 PROCEDURE — 25500020 PHARM REV CODE 255: Performed by: THORACIC SURGERY (CARDIOTHORACIC VASCULAR SURGERY)

## 2018-06-07 PROCEDURE — 25000003 PHARM REV CODE 250: Performed by: PHYSICIAN ASSISTANT

## 2018-06-07 PROCEDURE — 63600175 PHARM REV CODE 636 W HCPCS: Performed by: NURSE PRACTITIONER

## 2018-06-07 PROCEDURE — 84100 ASSAY OF PHOSPHORUS: CPT

## 2018-06-07 PROCEDURE — 25000003 PHARM REV CODE 250: Performed by: THORACIC SURGERY (CARDIOTHORACIC VASCULAR SURGERY)

## 2018-06-07 PROCEDURE — 83735 ASSAY OF MAGNESIUM: CPT

## 2018-06-07 PROCEDURE — A4216 STERILE WATER/SALINE, 10 ML: HCPCS | Performed by: THORACIC SURGERY (CARDIOTHORACIC VASCULAR SURGERY)

## 2018-06-07 PROCEDURE — 84132 ASSAY OF SERUM POTASSIUM: CPT

## 2018-06-07 PROCEDURE — 83735 ASSAY OF MAGNESIUM: CPT | Mod: 91

## 2018-06-07 PROCEDURE — 36415 COLL VENOUS BLD VENIPUNCTURE: CPT

## 2018-06-07 PROCEDURE — 85730 THROMBOPLASTIN TIME PARTIAL: CPT | Mod: 91

## 2018-06-07 PROCEDURE — 99232 SBSQ HOSP IP/OBS MODERATE 35: CPT | Mod: ,,, | Performed by: NURSE PRACTITIONER

## 2018-06-07 PROCEDURE — 97116 GAIT TRAINING THERAPY: CPT

## 2018-06-07 PROCEDURE — A4217 STERILE WATER/SALINE, 500 ML: HCPCS | Performed by: NURSE PRACTITIONER

## 2018-06-07 PROCEDURE — 63600175 PHARM REV CODE 636 W HCPCS: Performed by: RADIOLOGY

## 2018-06-07 PROCEDURE — 85730 THROMBOPLASTIN TIME PARTIAL: CPT

## 2018-06-07 PROCEDURE — 92526 ORAL FUNCTION THERAPY: CPT

## 2018-06-07 PROCEDURE — 0DHA3UZ INSERTION OF FEEDING DEVICE INTO JEJUNUM, PERCUTANEOUS APPROACH: ICD-10-PCS | Performed by: RADIOLOGY

## 2018-06-07 PROCEDURE — 85610 PROTHROMBIN TIME: CPT

## 2018-06-07 PROCEDURE — 85025 COMPLETE CBC W/AUTO DIFF WBC: CPT

## 2018-06-07 PROCEDURE — S0028 INJECTION, FAMOTIDINE, 20 MG: HCPCS | Performed by: NURSE PRACTITIONER

## 2018-06-07 PROCEDURE — 63600175 PHARM REV CODE 636 W HCPCS: Performed by: PHYSICIAN ASSISTANT

## 2018-06-07 RX ORDER — MIDAZOLAM HYDROCHLORIDE 1 MG/ML
INJECTION INTRAMUSCULAR; INTRAVENOUS CODE/TRAUMA/SEDATION MEDICATION
Status: COMPLETED | OUTPATIENT
Start: 2018-06-07 | End: 2018-06-07

## 2018-06-07 RX ORDER — FAMOTIDINE 10 MG/ML
20 INJECTION INTRAVENOUS DAILY
Status: DISCONTINUED | OUTPATIENT
Start: 2018-06-07 | End: 2018-06-08

## 2018-06-07 RX ORDER — FENTANYL CITRATE 50 UG/ML
25 INJECTION, SOLUTION INTRAMUSCULAR; INTRAVENOUS ONCE AS NEEDED
Status: COMPLETED | OUTPATIENT
Start: 2018-06-07 | End: 2018-06-07

## 2018-06-07 RX ORDER — DOCUSATE SODIUM 50 MG/5ML
10 LIQUID ORAL
Status: DISCONTINUED | OUTPATIENT
Start: 2018-06-07 | End: 2018-06-20 | Stop reason: HOSPADM

## 2018-06-07 RX ORDER — HEPARIN SODIUM 10000 [USP'U]/100ML
500 INJECTION, SOLUTION INTRAVENOUS CONTINUOUS
Status: DISCONTINUED | OUTPATIENT
Start: 2018-06-07 | End: 2018-06-12

## 2018-06-07 RX ORDER — MORPHINE SULFATE 2 MG/ML
2 INJECTION, SOLUTION INTRAMUSCULAR; INTRAVENOUS EVERY 4 HOURS PRN
Status: DISCONTINUED | OUTPATIENT
Start: 2018-06-07 | End: 2018-06-15

## 2018-06-07 RX ORDER — POTASSIUM CHLORIDE 7.45 MG/ML
10 INJECTION INTRAVENOUS ONCE
Status: COMPLETED | OUTPATIENT
Start: 2018-06-07 | End: 2018-06-07

## 2018-06-07 RX ORDER — FENTANYL CITRATE 50 UG/ML
INJECTION, SOLUTION INTRAMUSCULAR; INTRAVENOUS CODE/TRAUMA/SEDATION MEDICATION
Status: COMPLETED | OUTPATIENT
Start: 2018-06-07 | End: 2018-06-07

## 2018-06-07 RX ORDER — GLUCAGON 1 MG
KIT INJECTION CODE/TRAUMA/SEDATION MEDICATION
Status: COMPLETED | OUTPATIENT
Start: 2018-06-07 | End: 2018-06-07

## 2018-06-07 RX ADMIN — AMPICILLIN 2 G: 2 INJECTION, POWDER, FOR SOLUTION INTRAVENOUS at 06:06

## 2018-06-07 RX ADMIN — POTASSIUM CHLORIDE 10 MEQ: 7.46 INJECTION, SOLUTION INTRAVENOUS at 03:06

## 2018-06-07 RX ADMIN — Medication 2 MG: at 11:06

## 2018-06-07 RX ADMIN — FENTANYL CITRATE 25 MCG: 50 INJECTION, SOLUTION INTRAMUSCULAR; INTRAVENOUS at 09:06

## 2018-06-07 RX ADMIN — Medication 10 ML: at 12:06

## 2018-06-07 RX ADMIN — DEXTROSE: 5 SOLUTION INTRAVENOUS at 07:06

## 2018-06-07 RX ADMIN — FENTANYL CITRATE 50 MCG: 50 INJECTION, SOLUTION INTRAMUSCULAR; INTRAVENOUS at 04:06

## 2018-06-07 RX ADMIN — AMIODARONE HYDROCHLORIDE 0.5 MG/MIN: 1.8 INJECTION, SOLUTION INTRAVENOUS at 01:06

## 2018-06-07 RX ADMIN — CEFTRIAXONE SODIUM 2 G: 2 INJECTION, POWDER, FOR SOLUTION INTRAMUSCULAR; INTRAVENOUS at 04:06

## 2018-06-07 RX ADMIN — INSULIN ASPART 6 UNITS: 100 INJECTION, SOLUTION INTRAVENOUS; SUBCUTANEOUS at 12:06

## 2018-06-07 RX ADMIN — INSULIN ASPART 6 UNITS: 100 INJECTION, SOLUTION INTRAVENOUS; SUBCUTANEOUS at 04:06

## 2018-06-07 RX ADMIN — CALCIUM GLUCONATE: 94 INJECTION, SOLUTION INTRAVENOUS at 09:06

## 2018-06-07 RX ADMIN — INSULIN ASPART 6 UNITS: 100 INJECTION, SOLUTION INTRAVENOUS; SUBCUTANEOUS at 08:06

## 2018-06-07 RX ADMIN — Medication 3 ML: at 09:06

## 2018-06-07 RX ADMIN — AMIODARONE HYDROCHLORIDE 0.5 MG/MIN: 1.8 INJECTION, SOLUTION INTRAVENOUS at 12:06

## 2018-06-07 RX ADMIN — CEFTRIAXONE SODIUM 2 G: 2 INJECTION, POWDER, FOR SOLUTION INTRAMUSCULAR; INTRAVENOUS at 09:06

## 2018-06-07 RX ADMIN — AMPICILLIN 2 G: 2 INJECTION, POWDER, FOR SOLUTION INTRAVENOUS at 07:06

## 2018-06-07 RX ADMIN — MIDAZOLAM HYDROCHLORIDE 1 MG: 1 INJECTION, SOLUTION INTRAMUSCULAR; INTRAVENOUS at 04:06

## 2018-06-07 RX ADMIN — GLUCAGON HYDROCHLORIDE 1 MG: KIT at 04:06

## 2018-06-07 RX ADMIN — HEPARIN SODIUM AND DEXTROSE 500 UNITS/HR: 10000; 5 INJECTION INTRAVENOUS at 07:06

## 2018-06-07 RX ADMIN — AMPICILLIN 2 G: 2 INJECTION, POWDER, FOR SOLUTION INTRAVENOUS at 12:06

## 2018-06-07 RX ADMIN — FAMOTIDINE 20 MG: 10 INJECTION, SOLUTION INTRAVENOUS at 12:06

## 2018-06-07 RX ADMIN — INSULIN ASPART 2 UNITS: 100 INJECTION, SOLUTION INTRAVENOUS; SUBCUTANEOUS at 07:06

## 2018-06-07 RX ADMIN — INSULIN ASPART 6 UNITS: 100 INJECTION, SOLUTION INTRAVENOUS; SUBCUTANEOUS at 07:06

## 2018-06-07 RX ADMIN — IOHEXOL 30 ML: 300 INJECTION, SOLUTION INTRAVENOUS at 05:06

## 2018-06-07 RX ADMIN — DOCUSATE SODIUM 10 MG: 50 LIQUID ORAL at 12:06

## 2018-06-07 RX ADMIN — AMIODARONE HYDROCHLORIDE 0.5 MG/MIN: 1.8 INJECTION, SOLUTION INTRAVENOUS at 11:06

## 2018-06-07 NOTE — ASSESSMENT & PLAN NOTE
Estimated Creatinine Clearance: 43.5 mL/min (A) (based on SCr of 1.7 mg/dL (H)).  avoid hypoglycemia  Caution with insulin stacking  Lab Results   Component Value Date    CREATININE 1.7 (H) 06/07/2018

## 2018-06-07 NOTE — PLAN OF CARE
Problem: SLP Goal  Goal: SLP Goal  Speech Language Pathology Goals  Goals expected to be met by 6/11  1. Pt will tolerate puree & honey thickened liquids for pleasure while completing 2-3 swallows per bolus w/o overt S/S aspiration, MIN A   3. Educate Pt and family on safe swallow strategies and S/S aspiration   4. Continue to assess swallow to determine feasibility of PO upgrade   5. Pt will complete dysphagia exercises given min A      Speech Language Pathology Goals  Goals expected to be met by 6/7:  1. Pt will tolerate trials of puree w/o overt S/S aspiration, MIN A - CONTINUE  2. Pt will tolerate trials of nectar-thickened liquids w/o overt S/S aspiration, MIN A - DISCONTINUE  3. Educate Pt and family on safe swallow strategies and S/S aspiration - CONTINUE  4. Continue to assess swallow to determine feasibility of PO upgrade - CONTINUE      Goals expected to be met by 6/4: goals not met 2/2 transfer to ICU 5/30  1. Pt will participate in ongoing swallowing assessment to determine safest and least restrictive diet.          Outcome: Ongoing (interventions implemented as appropriate)  Pt participated well w/ tx tasks.  Goals remain appropriate.  Cont ST per POC.    eLyda Chopra CCC-SLP  6/7/2018

## 2018-06-07 NOTE — PROCEDURES
Radiology Post-Procedure Note    Pre Op Diagnosis: Dysphagia  Post Op Diagnosis: Same    Procedure: Gastrojejunostomy tube placement.     Procedure performed by: Ronny Navarro MD; MD Eduardo.     Written Informed Consent Obtained: Yes  Specimen Removed: NO  Estimated Blood Loss: Minimal    Findings:   Under US and fluoro guidance, an 18Fr gastrojejunostomy tube was placed. Contrast injection confirmed appropriate positioning.  Patient tolerated procedure well.    GJ tube is not yet ready for use. Gastric lumen to be open to gravity until tomorrow morning.     Nick Clark M.D. 5:32 PM 6/7/2018

## 2018-06-07 NOTE — PT/OT/SLP PROGRESS
Occupational Therapy   Treatment    Name: Rodolfo Messina  MRN: [id number]  Admitting Diagnosis:  S/P MVR (mitral valve replacement)  13 Days Post-Op    Recommendations:     Discharge Recommendations: rehabilitation facility  Discharge Equipment Recommendations:  none  Barriers to discharge:  None    Subjective     Communicated with: RN prior to session. Pt agreeable to OT treatment session.   Pain/Comfort:  · Pain Rating 1: 0/10  · Pain Rating Post-Intervention 1: 0/10    Patients cultural, spiritual, Uatsdin conflicts given the current situation: none     Objective:     Patient found with: telemetry, oxygen, peripheral IV, lackey catheter    General Precautions: Standard, fall, sternal   Orthopedic Precautions:N/A   Braces: N/A     Occupational Performance:    Bed Mobility:    · N/A-pt found seated [facility]    Functional Mobility/Transfers:  · Sit<>stand: min A   · From/onto bedside chair  · Min vc's to maintain sternal precautions   · x2 trials with pt able to maintain stand for approx 10 seconds during initial stand then requiring a seated break prior to functional mobility to the sink  · Functional Mobility: Pt engaging in functional mobility to simulate household distances approx 12ft from bedside chair>sink>back to bedside chair with CGA stabilizing on IV pole and w/o the use of any AD in order to maximize functional activity tolerance required for engagement in occupations of choice.     Activities of Daily Living:  · Grooming: CGA  · To stand at sink x3 minutes to brush teeth and wash face   · Pt able to open ADL containers without assistance and cross midline to access ADL items   · Tremor in BUE's present during grooming with pt reporting that is his baseline    Patient left up in chair with all lines intact, call button in reach and RN notified    [facility] 6 Click:  [facility] Total Score: 16    Treatment & Education:  · Pt requiring min vc's to maintain sternal precautions with transfers   · Communicated OT  POC  · Updated communication board   Education:    Assessment:     Rodolfo Messina is a 67 y.o. male with a medical diagnosis of S/P MVR (mitral valve replacement). Performance deficits affecting function are impaired self care skills, impaired functional mobilty, impaired balance, impaired endurance, weakness, gait instability, impaired cardiopulmonary response to activity. Pt requiring min A for transfers, CGA for functional mobility, SBA for standing grooming at this time. Pt p/w weakness and impaired functional activity tolerance limiting engagement and independence with functional mobility and ADLs.     Rehab Prognosis:  good; patient would benefit from acute skilled OT services to address these deficits and reach maximum level of function.       Plan:     Patient to be seen 5 x/week to address the above listed problems via self-care/home management, therapeutic activities, therapeutic exercises  · Plan of Care Expires: 06/30/18  · Plan of Care Reviewed with: patient    This Plan of care has been discussed with the patient who was involved in its development and understands and is in agreement with the identified goals and treatment plan    GOALS:    Occupational Therapy Goals        Problem: Occupational Therapy Goal    Goal Priority Disciplines Outcome Interventions   Occupational Therapy Goal     OT, PT/OT Ongoing (interventions implemented as appropriate)    Description:  Goals to be met by: 6/10/18     Patient will increase functional independence with ADLs by performing:    Feeding with Modified [place].  UE Dressing with Supervision.  LE Dressing with Supervision.  Grooming while standing at sink with Supervision.  Toileting from toilet with Supervision for hygiene and clothing management.   Toilet transfer to toilet with Supervision.                       Time Tracking:     OT Date of Treatment: 06/07/18  OT Start Time: 1023  OT Stop Time: 1050  OT Total Time (min): 27 min    Billable  Minutes:Self Care/Home Management 27 minutes    Afsaneh Sheppard, OT  6/7/2018

## 2018-06-07 NOTE — PROGRESS NOTES
"[facility]  Endocrinology  Progress Note    Admit Date: 5/23/2018     Reason for Consult: Management of  Hyperglycemia     Surgical Procedure and Date: s/p CABG x 1, MVR  5/25/18    HPI: Patient is a 67 y.o. male with a diagnosis of mitral valve endocarditis and resultant severe MR, TR and pulmonary HTN. The etiology of his endocarditis remains unknown. He was admitted to the SICU pre-operatively for IABP placement 5/23/18. Underwent cardiac surgery 5/25/18.   No personal or family hx of DM  Lab Results   Component Value Date    HGBA1C 5.4 02/22/2018     Endocrine consulted for BG mgmt.             Interval HPI:   Overnight events: remains in CTSU. On amiodarone infusion. BG elevated overnight with scheduled novolog; correction scale not consistently given. BG trending down this AM. Tentative plans for GJ tube placement.   Eating:   NPO  Nausea: No  Hypoglycemia and intervention: No  Fever: No  TPN: Yes at 50 cc/hr       BP (!) 125/59 (BP Location: Right arm, Patient Position: Lying)   Pulse 63   Temp 97.3 °F (36.3 °C) (Oral)   Resp 16   Ht 5' 10" (1.778 m)   Wt 77.1 kg (169 lb 15.6 oz)   SpO2 (!) 93%   BMI 24.39 kg/m²       Labs Reviewed and Include      Recent Labs  Lab 06/07/18  0426   *   CALCIUM 8.9   ALBUMIN 2.3*   PROT 5.9*   *   K 3.8   CO2 22*   *   BUN 66*   CREATININE 1.7*   ALKPHOS 91   ALT 23   AST 14   BILITOT 0.4     Lab Results   Component Value Date    WBC 8.01 06/07/2018    HGB 6.8 (L) 06/07/2018    HCT 21.6 (L) 06/07/2018    MCV 99 (H) 06/07/2018     06/07/2018     No results for input(s): TSH, FREET4 in the last 168 hours.  Lab Results   Component Value Date    HGBA1C 5.4 02/22/2018       Nutritional status:   Body mass index is 24.39 kg/m².  Lab Results   Component Value Date    ALBUMIN 2.3 (L) 06/07/2018    ALBUMIN 2.3 (L) 06/06/2018    ALBUMIN 2.3 (L) 06/05/2018     Lab Results   Component Value Date    PREALBUMIN 20 06/06/2018    PREALBUMIN " 12 (L) 02/22/2018       Estimated Creatinine Clearance: 43.5 mL/min (A) (based on SCr of 1.7 mg/dL (H)).    Accu-Checks  Recent Labs      06/05/18   1607  06/05/18 2011 06/06/18   0029  06/06/18   0404  06/06/18   0836  06/06/18   1228  06/06/18   1557  06/06/18   1955  06/07/18   0026  06/07/18   0425   POCTGLUCOSE  235*  250*  170*  198*  174*  219*  226*  284*  206*  168*       Current Medications and/or Treatments Impacting Glycemic Control  Immunotherapy:  Immunosuppressants     None        Steroids:   Hormones     None        Pressors:    Autonomic Drugs     None        Hyperglycemia/Diabetes Medications: Antihyperglycemics     Start     Stop Route Frequency Ordered    06/06/18 0800  insulin aspart U-100 pen 6 Units      -- SubQ Every 24 hours (non-standard times) 06/05/18 1049    06/06/18 0400  insulin aspart U-100 pen 6 Units      -- SubQ Every 24 hours (non-standard times) 06/05/18 1049    06/06/18 0000  insulin aspart U-100 pen 6 Units      -- SubQ Every 24 hours (non-standard times) 06/05/18 1049    06/05/18 2000  insulin aspart U-100 pen 6 Units      -- SubQ Every 24 hours (non-standard times) 06/05/18 1049    06/05/18 1600  insulin aspart U-100 pen 6 Units      -- SubQ Every 24 hours (non-standard times) 06/05/18 1049    06/05/18 1200  insulin aspart U-100 pen 6 Units      -- SubQ Every 24 hours (non-standard times) 06/05/18 1049    05/30/18 1703  insulin aspart U-100 pen 0-5 Units      -- SubQ Every 4 hours PRN 05/30/18 1705          ASSESSMENT and PLAN    * S/P MVR (mitral valve replacement)    Managed by CTS  avoid hypoglycemia          Acute hyperglycemia    BG goal 140-180 mg/dl    Continue Novolog 6 units Q4hr with TPN  Continue bg monitoring q4 hours and low dose correction scale; times changed for correction scale coverage. Please administer in addition to schedule novolog if BG elevated with glucose checkings.   Hold scheduled novolog if TPN suspended/discontinued.     Please page endocrine  NP on call with any BG related issues or concerns and with any change in nutrition plans.       Discharge plans-TBD; will depend on nutrition plans.              Coronary artery disease of native artery of native heart with stable angina pectoris-resolved as of 6/7/2018    Per CTS  avoid hypoglycemia          S/P CABG x 1    Managed by  CTS  avoid hypoglycemia          Chronic systolic congestive heart failure    Per CTS  avoid hypoglycemia            Sequelae of hyperalimentation    May increase insulin needs.           ANNA (acute kidney injury)    Estimated Creatinine Clearance: 43.5 mL/min (A) (based on SCr of 1.7 mg/dL (H)).  avoid hypoglycemia  Caution with insulin stacking  Lab Results   Component Value Date    CREATININE 1.7 (H) 06/07/2018                   Lynn Canseco NP  Endocrinology  [facility]

## 2018-06-07 NOTE — CONSULTS
Inpatient consult to Physical Medicine Rehab  Consult performed by: LOUIE MATA  Consult ordered by: TRACEY MARISCAL  Reason for consult: assess rehab needs        Reviewed patient history and current admission.  Rehab team following.  Full consult to follow.    FREYA Rizo, FNP-C  Physical Medicine & Rehabilitation   06/07/2018  Spectralink: [phone]

## 2018-06-07 NOTE — PT/OT/SLP PROGRESS
Physical Therapy Treatment    Patient Name:  Rodolfo Messina   MRN:  [id number]    Recommendations:     Discharge Recommendations:  rehabilitation facility   Discharge Equipment Recommendations: none   Barriers to discharge: None    Assessment:     Rodolfo Messina is a 67 y.o. male admitted with a medical diagnosis of S/P MVR (mitral valve replacement).  He presents with the following impairments/functional limitations:  weakness, impaired endurance, impaired functional mobilty, gait instability, impaired balance. Pt chad treatment better with further gait training distance. Will cont with skilled PT services 5x/wk to increase strength, endurance, and reach highest level of function. Recommending IP rehab once medically stable. MVR and CABG 5/25/18.    Rehab Prognosis:  good; patient would benefit from acute skilled PT services to address these deficits and reach maximum level of function.      Recent Surgery: Procedure(s) (LRB):  Mitral Valve Replacement (N/A)  AORTOCORONARY BYPASS-CABG (N/A) 13 Days Post-Op    Plan:     During this hospitalization, patient to be seen 5 x/week to address the above listed problems via gait training, therapeutic activities, therapeutic exercises  · Plan of Care Expires:  06/25/18   Plan of Care Reviewed with: patient (Grandson)    Subjective     Communicated with nurse prior to session.  Patient found sitting in chair upon PT entry to room, agreeable to treatment.      Chief Complaint: tired and fatigued  Patient comments/goals: to get better and go home.  Pain/Comfort:  · Pain Rating 1: 0/10    Patients cultural, spiritual, Yazidi conflicts given the current situation: none    Objective:     Patient found with: oxygen, peripheral IV, lackey catheter     General Precautions: Standard, fall, sternal   Orthopedic Precautions:N/A   Braces:       Functional Mobility:  · Bed Mobility:     · Rolling Right: contact guard assistance  · Sit to Supine: contact guard assistance. Gave verbal  instruction for sequential functional mobility in log rolling technique.  ·   · Transfers:     · Sit to Stand x3:  contact guard assistance with no AD.   ·   · Gait: 48 ft x2 with CGA plus 1 for managing equipment (IV pole and O2 intact). ~2 minute sitting rest break in between ambulations. Pt presented SOB during ambulation, but no report of dizziness.      AM-PAC 6 CLICK MOBILITY  Turning over in bed (including adjusting bedclothes, sheets and blankets)?: 3  Sitting down on and standing up from a chair with arms (e.g., wheelchair, bedside commode, etc.): 3  Moving from lying on back to sitting on the side of the bed?: 3  Moving to and from a bed to a chair (including a wheelchair)?: 3  Need to walk in hospital room?: 2  Climbing 3-5 steps with a railing?: 1  Total Score: 15       Patient left supine with all lines intact, call button in reach and grandson present..    GOALS:    Physical Therapy Goals        Problem: Physical Therapy Goal    Goal Priority Disciplines Outcome Goal Variances Interventions   Physical Therapy Goal     PT/OT, PT Ongoing (interventions implemented as appropriate)     Description:  Goals to be met by: 6/15/2018    Patient will increase functional independence with mobility by performin. Supine to sit with Supervision - not met  2. Sit to stand transfer with Supervision - not met  3. Bed to chair transfer with Supervision  - not met  4. Gait  x 250 feet with Supervision - not met  5. Lower extremity exercise program x20 reps per handout, with independence - met                         Time Tracking:     PT Received On: 18  PT Start Time: 1338     PT Stop Time: 1358  PT Total Time (min): 20 min     Billable Minutes: Gait Training 20 minutes    Treatment Type: Treatment  PT/PTA: PT     PTA Visit Number: 0     MICHAEL Taylor  2018

## 2018-06-07 NOTE — ASSESSMENT & PLAN NOTE
BG goal 140-180 mg/dl    Continue Novolog 6 units Q4hr with TPN  Continue bg monitoring q4 hours and low dose correction scale; times changed for correction scale coverage. Please administer in addition to schedule novolog if BG elevated with glucose checkings.   Hold scheduled novolog if TPN suspended/discontinued.     Please page endocrine NP on call with any BG related issues or concerns and with any change in nutrition plans.       Discharge plans-TBD; will depend on nutrition plans.

## 2018-06-07 NOTE — PLAN OF CARE
SW spoke with the pt about rehab. SW explained that therapy is recommending rehab.  He wants to stay near the hospital. SW explained the a referral can be sent to [facility] but [facility].  [facility] in [facility].  [facility]  SW sent the referral to the [facility].  SW will f/u as needed.  Pt will be ready on Monday.    Radha Montes, [facility] x [phone]

## 2018-06-07 NOTE — PLAN OF CARE
Problem: Occupational Therapy Goal  Goal: Occupational Therapy Goal  Goals to be met by: 6/10/18     Patient will increase functional independence with ADLs by performing:    Feeding with Modified [place].  UE Dressing with Supervision.  LE Dressing with Supervision.  Grooming while standing at sink with Supervision.  Toileting from toilet with Supervision for hygiene and clothing management.   Toilet transfer to toilet with Supervision.      Outcome: Ongoing (interventions implemented as appropriate)  Goals reviewed and continue to remain appropriate.

## 2018-06-07 NOTE — NURSING
Asiya Fernandez, NP at bedside VORB to assess patient and okay to not disturb patient x2 hours. NPO including medications, awaiting IR decision to place PEG tube. No NG tubes. Will continue to monitor.

## 2018-06-07 NOTE — NURSING
PARTHA Lopez, EDUARDA notified patient's complaining of decreased hearing. Patient states that he uses colace flushes PRN at home for ear wax buildup. NP to place orders. Will continue to monitor.

## 2018-06-07 NOTE — SUBJECTIVE & OBJECTIVE
Interval History: had some hypotension overnight. States he didn't sleep well. Afib on monitor.      Medications:  Continuous Infusions:   amiodarone in dextrose 5% 0.5 mg/min (06/07/18 1306)    dextrose 5 % Stopped (06/06/18 1430)    TPN ADULT CENTRAL LINE CUSTOM 50 mL/hr at 06/06/18 2228     Scheduled Meds:   amLODIPine  10 mg Per NG tube Daily    ampicillin IVPB  2 g Intravenous Q6H    aspirin  324 mg Per NG tube Daily    atorvastatin  40 mg Per NG tube Daily    cefTRIAXone (ROCEPHIN) IVPB  2 g Intravenous Q12H    doxazosin  4 mg Per NG tube Daily    famotidine (PF)  20 mg Intravenous Daily    hydrALAZINE  25 mg Oral Q8H    insulin aspart U-100  6 Units Subcutaneous Q24H    insulin aspart U-100  6 Units Subcutaneous Q24H    insulin aspart U-100  6 Units Subcutaneous Q24H    insulin aspart U-100  6 Units Subcutaneous Q24H    insulin aspart U-100  6 Units Subcutaneous Q24H    insulin aspart U-100  6 Units Subcutaneous Q24H    mirtazapine  15 mg Per NG tube QHS    polyethylene glycol  17 g Per NG tube Daily    potassium chloride in water  10 mEq Intravenous Once    ramelteon  8 mg Oral QHS    sodium chloride 0.9%  10 mL Intravenous Q6H    sodium chloride 0.9%  3 mL Intravenous Q8H     PRN Meds:sodium chloride, albumin human 5%, albuterol-ipratropium, bisacodyl, dextrose 50%, docusate, glucagon (human recombinant), hydrALAZINE, insulin aspart U-100, lactated ringers, metoclopramide HCl, ondansetron, oxyCODONE, Flushing PICC Protocol **AND** sodium chloride 0.9% **AND** sodium chloride 0.9%, white petrolatum-mineral oil     Objective:     Vital Signs (Most Recent):  Temp: 97.4 °F (36.3 °C) (06/07/18 1201)  Pulse: 63 (06/07/18 1201)  Resp: 18 (06/07/18 1201)  BP: (!) 120/58 (06/07/18 1201)  SpO2: (!) 94 % (06/07/18 1215) Vital Signs (24h Range):  Temp:  [96.7 °F (35.9 °C)-97.6 °F (36.4 °C)] 97.4 °F (36.3 °C)  Pulse:  [] 63  Resp:  [16-18] 18  SpO2:  [84 %-97 %] 94 %  BP: ()/(53-59)  120/58     Weight: 77.1 kg (169 lb 15.6 oz)  Body mass index is 24.39 kg/m².    SpO2: (!) 94 %  O2 Device (Oxygen Therapy): nasal cannula    Intake/Output - Last 3 Shifts       06/05 0700 - 06/06 0659 06/06 0700 - 06/07 0659 06/07 0700 - 06/08 0659    P.O. 120 0     I.V. (mL/kg) 1503 (19.5) 393 (5.1)     IV Piggyback  250     TPN 1527 772.2     Total Intake(mL/kg) 3150 (40.9) 1415.2 (18.4)     Urine (mL/kg/hr) 1525 (0.8) 1300 (0.7) 1125 (2)    Total Output 1525 1300 1125    Net +1625 +115.2 -1125                 Lines/Drains/Airways     Peripherally Inserted Central Catheter Line                 PICC Double Lumen 03/05/18 1539 right basilic 93 days         PICC Double Lumen 06/04/18 1654 left brachial 2 days          Central Venous Catheter Line                 Percutaneous Central Line Insertion/Assessment - Quad lumen  05/25/18 0800 left subclavian 13 days          Drain                 Urethral Catheter 06/06/18 0030 1 day          Line                 Pacer Wires 05/25/18 1355 13 days          Peripheral Intravenous Line                 Peripheral IV - Single Lumen 06/05/18 1300 Right Hand 2 days                Physical Exam   Constitutional: He is oriented to person, place, and time. He appears cachectic.   HENT:   Head: Normocephalic and atraumatic.   Eyes: Pupils are equal, round, and reactive to light.   Neck: Normal range of motion. Neck supple.   Cardiovascular: Normal rate and normal heart sounds.  An irregularly irregular rhythm present.   Pulmonary/Chest: Effort normal. He has decreased breath sounds in the right lower field and the left lower field. He has rales.   Abdominal: Soft. Bowel sounds are normal.   Musculoskeletal: Normal range of motion.   Neurological: He is alert and oriented to person, place, and time.   Skin: Skin is warm and dry.   Psychiatric: He has a normal mood and affect. His behavior is normal.       Significant Labs:  CBC:   Recent Labs  Lab 06/07/18  0426   WBC 8.01   RBC 2.19*    HGB 6.8*   HCT 21.6*      MCV 99*   MCH 31.1*   MCHC 31.5*     CMP:   Recent Labs  Lab 06/07/18  0426   *   CALCIUM 8.9   ALBUMIN 2.3*   PROT 5.9*   *   K 3.8   CO2 22*   *   BUN 66*   CREATININE 1.7*   ALKPHOS 91   ALT 23   AST 14   BILITOT 0.4       Significant Diagnostics:  CXR: I have reviewed all pertinent results/findings within the past 24 hours

## 2018-06-07 NOTE — PROGRESS NOTES
Pt arrived to ROCU bed 1 for 1 hour post G-tube placement recovery. Report received from MIRANDA Murcia. Pt denies pain/discomfort. Dressing CDI. VSS. No acute events. See flow sheets for post procedure monitoring.

## 2018-06-07 NOTE — NURSING
PARTHA Malik, EDUARDA notified patient in SR with PVCs. Potassium was 3.8. NP TORB to administer Potassium Chloride 10mEq, IVPB, ONCE, NOW. Orders placed and to be carried out. Will continue to monitor.   (0) independent

## 2018-06-07 NOTE — PROGRESS NOTES
[facility]  Cardiothoracic Surgery  Progress Note    Patient Name: Rodolfo Messina  MRN: [id number]  Admission Date: 5/23/2018  Hospital Length of Stay: 15 days  Code Status: Full Code   Attending Physician: Marvin Go MD   Referring Provider: Marvin Go MD  Principal Problem:S/P MVR (mitral valve replacement)    Subjective:     Post-Op Info:  Procedure(s) (LRB):  Mitral Valve Replacement (N/A)  AORTOCORONARY BYPASS-CABG (N/A)   13 Days Post-Op     Interval History: had some hypotension overnight. States he didn't sleep well. Afib on monitor.      Medications:  Continuous Infusions:   amiodarone in dextrose 5% 0.5 mg/min (06/07/18 1306)    dextrose 5 % Stopped (06/06/18 1430)    TPN ADULT CENTRAL LINE CUSTOM 50 mL/hr at 06/06/18 1458     Scheduled Meds:   amLODIPine  10 mg Per NG tube Daily    ampicillin IVPB  2 g Intravenous Q6H    aspirin  324 mg Per NG tube Daily    atorvastatin  40 mg Per NG tube Daily    cefTRIAXone (ROCEPHIN) IVPB  2 g Intravenous Q12H    doxazosin  4 mg Per NG tube Daily    famotidine (PF)  20 mg Intravenous Daily    hydrALAZINE  25 mg Oral Q8H    insulin aspart U-100  6 Units Subcutaneous Q24H    insulin aspart U-100  6 Units Subcutaneous Q24H    insulin aspart U-100  6 Units Subcutaneous Q24H    insulin aspart U-100  6 Units Subcutaneous Q24H    insulin aspart U-100  6 Units Subcutaneous Q24H    insulin aspart U-100  6 Units Subcutaneous Q24H    mirtazapine  15 mg Per NG tube QHS    polyethylene glycol  17 g Per NG tube Daily    potassium chloride in water  10 mEq Intravenous Once    ramelteon  8 mg Oral QHS    sodium chloride 0.9%  10 mL Intravenous Q6H    sodium chloride 0.9%  3 mL Intravenous Q8H     PRN Meds:sodium chloride, albumin human 5%, albuterol-ipratropium, bisacodyl, dextrose 50%, docusate, glucagon (human recombinant), hydrALAZINE, insulin aspart U-100, lactated ringers, metoclopramide HCl, ondansetron, oxyCODONE, Flushing PICC  Protocol **AND** sodium chloride 0.9% **AND** sodium chloride 0.9%, white petrolatum-mineral oil     Objective:     Vital Signs (Most Recent):  Temp: 97.4 °F (36.3 °C) (06/07/18 1201)  Pulse: 63 (06/07/18 1201)  Resp: 18 (06/07/18 1201)  BP: (!) 120/58 (06/07/18 1201)  SpO2: (!) 94 % (06/07/18 1215) Vital Signs (24h Range):  Temp:  [96.7 °F (35.9 °C)-97.6 °F (36.4 °C)] 97.4 °F (36.3 °C)  Pulse:  [] 63  Resp:  [16-18] 18  SpO2:  [84 %-97 %] 94 %  BP: ()/(53-59) 120/58     Weight: 77.1 kg (169 lb 15.6 oz)  Body mass index is 24.39 kg/m².    SpO2: (!) 94 %  O2 Device (Oxygen Therapy): nasal cannula    Intake/Output - Last 3 Shifts       06/05 0700 - 06/06 0659 06/06 0700 - 06/07 0659 06/07 0700 - 06/08 0659    P.O. 120 0     I.V. (mL/kg) 1503 (19.5) 393 (5.1)     IV Piggyback  250     TPN 1527 772.2     Total Intake(mL/kg) 3150 (40.9) 1415.2 (18.4)     Urine (mL/kg/hr) 1525 (0.8) 1300 (0.7) 1125 (2)    Total Output 1525 1300 1125    Net +1625 +115.2 -1125                 Lines/Drains/Airways     Peripherally Inserted Central Catheter Line                 PICC Double Lumen 03/05/18 1539 right basilic 93 days         PICC Double Lumen 06/04/18 1654 left brachial 2 days          Central Venous Catheter Line                 Percutaneous Central Line Insertion/Assessment - Quad lumen  05/25/18 0800 left subclavian 13 days          Drain                 Urethral Catheter 06/06/18 0030 1 day          Line                 Pacer Wires 05/25/18 1355 13 days          Peripheral Intravenous Line                 Peripheral IV - Single Lumen 06/05/18 1300 Right Hand 2 days                Physical Exam   Constitutional: He is oriented to person, place, and time. He appears cachectic.   HENT:   Head: Normocephalic and atraumatic.   Eyes: Pupils are equal, round, and reactive to light.   Neck: Normal range of motion. Neck supple.   Cardiovascular: Normal rate and normal heart sounds.  An irregularly irregular rhythm  present.   Pulmonary/Chest: Effort normal. He has decreased breath sounds in the right lower field and the left lower field. He has rales.   Abdominal: Soft. Bowel sounds are normal.   Musculoskeletal: Normal range of motion.   Neurological: He is alert and oriented to person, place, and time.   Skin: Skin is warm and dry.   Psychiatric: He has a normal mood and affect. His behavior is normal.       Significant Labs:  CBC:   Recent Labs  Lab 06/07/18  0426   WBC 8.01   RBC 2.19*   HGB 6.8*   HCT 21.6*      MCV 99*   MCH 31.1*   MCHC 31.5*     CMP:   Recent Labs  Lab 06/07/18  0426   *   CALCIUM 8.9   ALBUMIN 2.3*   PROT 5.9*   *   K 3.8   CO2 22*   *   BUN 66*   CREATININE 1.7*   ALKPHOS 91   ALT 23   AST 14   BILITOT 0.4       Significant Diagnostics:  CXR: I have reviewed all pertinent results/findings within the past 24 hours    Assessment/Plan:     * S/P MVR (mitral valve replacement)    --Continue ASA, statin (holding BB)  --Sternal precautions  --PT/OT  --Ambulate x4   --Wean O2 as tolerated for O2 sat >92%  --monitor CXR    --Monitor electrolytes and replace prn  --hold Lasix  --D5W @50  -- Q6  --continue  lackey   --strict I&Os  --did not pass modified swallow; PICC and TPN  --consult IR for G-J tube placement- scheduled for today         DISPO: discharge planning on going for likely rehab           On enteral nutrition    --continue TPN with lipids until GJ tube placed         Epistaxis    --Noted to have post traumatic left sided epistaxis following attempted NGT placement. On the time of evaluation patient was hemostatic. No active bleeding noted.     -Given that patient is hemostatic, no acute intervention   -Recommend minimize nasal trauma as best possible  -If requires oxygen, ideal if it is humidified via face tent and not nasal canula  -Bacitracin to bilateral nares nightly  -Afrin q6hrs PRN bleeding (only if ok from Cards prospective)  -Ocean nasal spray q6hrs while  awake (starting in a couple days, allow stable clot to form for now)  -If bleeding restarts, hold pressure to fleshy portion of nose (10 mins) and lean forward  -For bleeding that does not stop, page resident  -Remainder of medical management per primary team        Atrial flutter    --EP following appreciate recs., will reach out to them- pt currently in Afib that began 6/6- Amio IV load and infusion   --holding BB        S/P CABG x 1    --see MVR         Chronic systolic congestive heart failure    - ASA, statin  - D/c beta blocker  - EP following; appreciate recs          ANNA (acute kidney injury)    --Elevated at 1.7 today, yesterday 1.6.   --holding Stuart Fernandez NP  Cardiothoracic Surgery  [facility]

## 2018-06-07 NOTE — PLAN OF CARE
Problem: Patient Care Overview  Goal: Plan of Care Review  Outcome: Ongoing (interventions implemented as appropriate)  Plan of care discussed with patient, no new questions at this time. VSS, denies SOB, no complaint of pain. Pt still remains NPO. TPN/Lipids given per MAR for nutrition. Amio gtt infused. HR and rhythm being monitored continuously.  Safety precautions taken, no falls/trauma during the shift. Will continue to monitor.

## 2018-06-07 NOTE — ASSESSMENT & PLAN NOTE
--Continue ASA, statin (holding BB)  --Sternal precautions  --PT/OT  --Ambulate x4   --Wean O2 as tolerated for O2 sat >92%  --monitor CXR    --Monitor electrolytes and replace prn  --hold Lasix  --D5W @50  -- Q6  --continue  lackey   --strict I&Os  --did not pass modified swallow; PICC and TPN  --consult IR for G-J tube placement- scheduled for today         DISPO: discharge planning on going for likely rehab

## 2018-06-07 NOTE — ASSESSMENT & PLAN NOTE
--EP following appreciate recs., will reach out to them- pt currently in Afib that began 6/6- Amio IV load and infusion   --holding BB

## 2018-06-07 NOTE — PLAN OF CARE
Problem: Patient Care Overview  Goal: Plan of Care Review  Outcome: Ongoing (interventions implemented as appropriate)  Discussed Plan of Care with patient. VS stable. Ambulates well with 1-person assist. Fall precautions in place. Sternal precautions reviewed. Bleeding precautions reviewed and maintained. Heparin/ Coumarin being held for enteral feeding tube placement. INR 1.7. NP to initiate Heparin gtt at 500U/hr when patient back from IR. Lipids last night. TPN ordered and infusing. Amiodarone gtt infusing per order and scheduled to transition this evening. Potassium replaced IVPB. Ampicillin and Ceftriaxone continued IV. All PO medications held.  Colace to Bilat ears for ear wax buildup, continue PRN. Pain denied. Able to wean oxygen to 1.5L, maintaining O2 sats greater than 92%. Dextrose infusing per order and blood glucose monitored q4hrs with scheduled insulin aspart. Patient remained free of falls/ injury. All questions and concerns were addressed. Will continue to monitor patient.

## 2018-06-07 NOTE — NURSING
Report called from Cath lab. Patient sent for recovery for approximately 45 minutes, GJ tube placed. J tube closed and G tube attached to dependent drainage bag. MD orders (nursing communication) to leave tubes closed/ draining until team rounds in the AM and clears tubes for usage. Will continue to monitor.

## 2018-06-07 NOTE — NURSING
Spoke with Dr. Saldaña with CTS about patient just back from G/J tube placement. Patient complaining of pain to LLQ 10 out of 10. Patient NPO, with the only pain medication order of Oxycodone PO. MD orders to administer Fentanyl IVP. MD notified RN would need the MD to place the the order. Fentanyl not commonly given on the unit. MD stated that I could put the order in. Verified with charge nurse that Fentanyl could be administered and asked again for MD to place the narcotic order. MD stated again that I could take a verbal order. Will speak with charge nurse.

## 2018-06-07 NOTE — HOSPITAL COURSE
On 5/25/18, the patient was taken to the Operating Room for the above stated procedure. Please see the previously dictated operative report for complete details. Postoperatively, the patient was taken from the  Operating Room to the ICU where the vital signs were monitored and pain was kept under control. The patient was weaned from the drips and extubated in the ICU per protocol. Once hemodynamically stable, the patient was transferred to the Cardiac Step-Down floor for continued strengthening and ambulation.  Patients hospital course was complicated by A flutter. On 5/28 SOWMYA for DCCV-thrombus was found- so no DCCV. Pt converted to NSR with medication. Pt also suffered with dysphagia- PEG tube placed for feeds on 6/7/18. Pt so has urinary retention. He will DC with a urinary catheter and f/u with them on 6/26. He had some nasal bleeding, ENT was consulted and that has resolved. ID consulted, recommend Ampicillin and Ceftriaxone x 6 weeks for Enterococcal SBE end date 7/13.  On postoperative day 28, the patient was ready for discharge to home with home christi PT/OT/ST. At the time of discharge, the patient was ambulating unassisted. Pain was well controlled with oral analgesics and the patient was tolerating the diet.     MOBILITY AND ACTIVITY: As tolerated. No heavy lifting of greater than 5 pounds and no driving.     DIET: An 1800-calorie ADA with a 1500 mL fluid restriction.     WOUND CARE INSTRUCTIONS: Check for redness, swelling and drainage around the  incision or wound. Patient is to call for any obvious bleeding, drainage, pus from the wound, unusual problems or difficulties or temperature of greater than 101   degrees.     FOLLOWUP: Follow up with Dr. Abbi PEREZ- will contact pt. Prior to this  appointment, the patient will have a chest x-ray and EKG.     Patient not placed on [employer] at the time of discharge due to ANNA    DISCHARGE CONDITION: At the time of discharge, the patient was in sinus rhythm  and afebrile with stable vital signs.

## 2018-06-07 NOTE — SUBJECTIVE & OBJECTIVE
"Interval HPI:   Overnight events: remains in CTSU. On amiodarone infusion. BG elevated overnight with scheduled novolog; correction scale not consistently given. BG trending down this AM. Tentative plans for GJ tube placement.   Eating:   NPO  Nausea: No  Hypoglycemia and intervention: No  Fever: No  TPN: Yes at 50 cc/hr       BP (!) 125/59 (BP Location: Right arm, Patient Position: Lying)   Pulse 63   Temp 97.3 °F (36.3 °C) (Oral)   Resp 16   Ht 5' 10" (1.778 m)   Wt 77.1 kg (169 lb 15.6 oz)   SpO2 (!) 93%   BMI 24.39 kg/m²     Labs Reviewed and Include      Recent Labs  Lab 06/07/18  0426   *   CALCIUM 8.9   ALBUMIN 2.3*   PROT 5.9*   *   K 3.8   CO2 22*   *   BUN 66*   CREATININE 1.7*   ALKPHOS 91   ALT 23   AST 14   BILITOT 0.4     Lab Results   Component Value Date    WBC 8.01 06/07/2018    HGB 6.8 (L) 06/07/2018    HCT 21.6 (L) 06/07/2018    MCV 99 (H) 06/07/2018     06/07/2018     No results for input(s): TSH, FREET4 in the last 168 hours.  Lab Results   Component Value Date    HGBA1C 5.4 02/22/2018       Nutritional status:   Body mass index is 24.39 kg/m².  Lab Results   Component Value Date    ALBUMIN 2.3 (L) 06/07/2018    ALBUMIN 2.3 (L) 06/06/2018    ALBUMIN 2.3 (L) 06/05/2018     Lab Results   Component Value Date    PREALBUMIN 20 06/06/2018    PREALBUMIN 12 (L) 02/22/2018       Estimated Creatinine Clearance: 43.5 mL/min (A) (based on SCr of 1.7 mg/dL (H)).    Accu-Checks  Recent Labs      06/05/18   1607  06/05/18 2011 06/06/18   0029  06/06/18   0404  06/06/18   0836  06/06/18   1228  06/06/18   1557  06/06/18   1955  06/07/18   0026  06/07/18   0425   POCTGLUCOSE  235*  250*  170*  198*  174*  219*  226*  284*  206*  168*       Current Medications and/or Treatments Impacting Glycemic Control  Immunotherapy:  Immunosuppressants     None        Steroids:   Hormones     None        Pressors:    Autonomic Drugs     None        Hyperglycemia/Diabetes Medications: " Antihyperglycemics     Start     Stop Route Frequency Ordered    06/06/18 0800  insulin aspart U-100 pen 6 Units      -- SubQ Every 24 hours (non-standard times) 06/05/18 1049    06/06/18 0400  insulin aspart U-100 pen 6 Units      -- SubQ Every 24 hours (non-standard times) 06/05/18 1049    06/06/18 0000  insulin aspart U-100 pen 6 Units      -- SubQ Every 24 hours (non-standard times) 06/05/18 1049    06/05/18 2000  insulin aspart U-100 pen 6 Units      -- SubQ Every 24 hours (non-standard times) 06/05/18 1049    06/05/18 1600  insulin aspart U-100 pen 6 Units      -- SubQ Every 24 hours (non-standard times) 06/05/18 1049    06/05/18 1200  insulin aspart U-100 pen 6 Units      -- SubQ Every 24 hours (non-standard times) 06/05/18 1049    05/30/18 1703  insulin aspart U-100 pen 0-5 Units      -- SubQ Every 4 hours PRN 05/30/18 1705

## 2018-06-07 NOTE — PT/OT/SLP PROGRESS
"Speech Language Pathology Treatment    Patient Name:  Rodolfo Messina   MRN:  [id number]  Admitting Diagnosis: S/P MVR (mitral valve replacement)    Recommendations:                 General Recommendations:  Dysphagia therapy  Diet recommendations:  NPO, Liquid Diet Level: NPO   Aspiration Precautions: Continue alternate means of nutrition, Frequent oral care and Strict aspiration precautions   General Precautions: Standard, aspiration, fall, NPO, sternal  Communication strategies:  none    Subjective     "Can I have water to swich and spit it out?"  Pt asked SLP  Patient goals: to have water      Pain/Comfort:  · Pain Rating 1: 0/10  · Pain Rating Post-Intervention 1: 0/10    Objective:     Has the patient been evaluated by SLP for swallowing?   Yes  Keep patient NPO? Yes   Current Respiratory Status: nasal cannula      Pt was agreeable to tx session.  He reported completing dysphagia ex;s indep daily.  He completed basic dysphagia ex;s x15 reps for tongue base retraction for glottal /g, k/ and hyolaryngeal elevation w/ falsetto /i/ w/ good effort and ability.  He completed effortful swallow x10 reps and mendelsohn maneuver x5 reps.  He completed willie maneuver x1/5 reps given max cues. Po trials of puree and honey thick liquids were deferred d/t pt npo pending procedure (peg in IR).  He was provided education re: slp role, npo status, dysphagia ex;'s hep and slp poc.  He indicated good understanding of the information provided.    Assessment:     Rodolfo Messina is a 67 y.o. male with an SLP diagnosis of Dysphagia.  He presents with steady progress towards goals.    Goals:    SLP Goals        Problem: SLP Goal    Goal Priority Disciplines Outcome   SLP Goal     SLP Ongoing (interventions implemented as appropriate)   Description:  Speech Language Pathology Goals  Goals expected to be met by 6/11  1. Pt will tolerate puree & honey thickened liquids for pleasure while completing 2-3 swallows per bolus w/o overt S/S " aspiration, MIN A   3. Educate Pt and family on safe swallow strategies and S/S aspiration   4. Continue to assess swallow to determine feasibility of PO upgrade   5. Pt will complete dysphagia exercises given min A      Speech Language Pathology Goals  Goals expected to be met by 6/7:  1. Pt will tolerate trials of puree w/o overt S/S aspiration, MIN A - CONTINUE  2. Pt will tolerate trials of nectar-thickened liquids w/o overt S/S aspiration, MIN A - DISCONTINUE  3. Educate Pt and family on safe swallow strategies and S/S aspiration - CONTINUE  4. Continue to assess swallow to determine feasibility of PO upgrade - CONTINUE      Goals expected to be met by 6/4: goals not met 2/2 transfer to ICU 5/30  1. Pt will participate in ongoing swallowing assessment to determine safest and least restrictive diet.                           Plan:     · Patient to be seen:  5 x/week   · Plan of Care expires:  06/30/18  · Plan of Care reviewed with:  patient   · SLP Follow-Up:  Yes       Discharge recommendations:  rehabilitation facility   Barriers to Discharge:  None    Time Tracking:     SLP Treatment Date:   06/07/18  Speech Start Time:  1303  Speech Stop Time:  1327     Speech Total Time (min):  24 min    Billable Minutes: Treatment Swallowing Dysfunction 14 and Seld Care/Home Management Training 10    Leyda Chopra CCC-SLP  06/07/2018

## 2018-06-07 NOTE — NURSING TRANSFER
Nursing Transfer Note      6/7/2018     Transfer To: IR    Transfer via stretcher    Transfer with cardiac monitoring    Transported by transport    Medicines sent: TPN, Amiodarone, Potassium Chloride, and Dextrose infusing by infusion pump per order.    Chart send with patient: No    Notified: spouse and grandson walking patient to IR and IR notified that family will stay in room awaiting updates.

## 2018-06-08 PROBLEM — R13.10 DYSPHAGIA: Status: ACTIVE | Noted: 2018-06-08

## 2018-06-08 PROBLEM — Z74.09 IMPAIRED FUNCTIONAL MOBILITY AND ENDURANCE: Status: ACTIVE | Noted: 2018-06-08

## 2018-06-08 LAB
ABO + RH BLD: NORMAL
ALBUMIN SERPL BCP-MCNC: 2.2 G/DL
ALP SERPL-CCNC: 87 U/L
ALT SERPL W/O P-5'-P-CCNC: 18 U/L
ANION GAP SERPL CALC-SCNC: 11 MMOL/L
APTT BLDCRRT: 31.3 SEC
APTT BLDCRRT: 31.4 SEC
APTT BLDCRRT: 33.4 SEC
APTT BLDCRRT: 33.9 SEC
AST SERPL-CCNC: 12 U/L
BASOPHILS # BLD AUTO: 0.01 K/UL
BASOPHILS NFR BLD: 0.1 %
BILIRUB SERPL-MCNC: 0.5 MG/DL
BLD GP AB SCN CELLS X3 SERPL QL: NORMAL
BLD PROD TYP BPU: NORMAL
BLOOD UNIT EXPIRATION DATE: NORMAL
BLOOD UNIT TYPE CODE: 600
BLOOD UNIT TYPE: NORMAL
BUN SERPL-MCNC: 60 MG/DL
CALCIUM SERPL-MCNC: 8.3 MG/DL
CHLORIDE SERPL-SCNC: 107 MMOL/L
CO2 SERPL-SCNC: 24 MMOL/L
CODING SYSTEM: NORMAL
CREAT SERPL-MCNC: 1.6 MG/DL
DIFFERENTIAL METHOD: ABNORMAL
DISPENSE STATUS: NORMAL
EOSINOPHIL # BLD AUTO: 0.3 K/UL
EOSINOPHIL NFR BLD: 3.2 %
ERYTHROCYTE [DISTWIDTH] IN BLOOD BY AUTOMATED COUNT: 16.9 %
EST. GFR  (AFRICAN AMERICAN): 50.8 ML/MIN/1.73 M^2
EST. GFR  (NON AFRICAN AMERICAN): 43.9 ML/MIN/1.73 M^2
GLUCOSE SERPL-MCNC: 363 MG/DL
HCT VFR BLD AUTO: 20.1 %
HGB BLD-MCNC: 6.3 G/DL
IMM GRANULOCYTES # BLD AUTO: 0.05 K/UL
IMM GRANULOCYTES NFR BLD AUTO: 0.6 %
INR PPP: 1.4
LYMPHOCYTES # BLD AUTO: 0.6 K/UL
LYMPHOCYTES NFR BLD: 6.4 %
MAGNESIUM SERPL-MCNC: 3.2 MG/DL
MCH RBC QN AUTO: 31.2 PG
MCHC RBC AUTO-ENTMCNC: 31.6 G/DL
MCV RBC AUTO: 99 FL
MONOCYTES # BLD AUTO: 0.5 K/UL
MONOCYTES NFR BLD: 5.7 %
NEUTROPHILS # BLD AUTO: 7.2 K/UL
NEUTROPHILS NFR BLD: 84 %
NRBC BLD-RTO: 0 /100 WBC
PHOSPHATE SERPL-MCNC: 4 MG/DL
PLATELET # BLD AUTO: 175 K/UL
PMV BLD AUTO: 12.8 FL
POCT GLUCOSE: 180 MG/DL (ref 70–110)
POCT GLUCOSE: 190 MG/DL (ref 70–110)
POCT GLUCOSE: 215 MG/DL (ref 70–110)
POCT GLUCOSE: 226 MG/DL (ref 70–110)
POCT GLUCOSE: 259 MG/DL (ref 70–110)
POCT GLUCOSE: 450 MG/DL (ref 70–110)
POTASSIUM SERPL-SCNC: 3.5 MMOL/L
POTASSIUM SERPL-SCNC: 4.3 MMOL/L
PROT SERPL-MCNC: 5.5 G/DL
PROTHROMBIN TIME: 13.8 SEC
RBC # BLD AUTO: 1.99 M/UL
SODIUM SERPL-SCNC: 142 MMOL/L
TRANS ERYTHROCYTES VOL PATIENT: NORMAL ML
WBC # BLD AUTO: 8.62 K/UL

## 2018-06-08 PROCEDURE — A4217 STERILE WATER/SALINE, 500 ML: HCPCS | Performed by: NURSE PRACTITIONER

## 2018-06-08 PROCEDURE — 85025 COMPLETE CBC W/AUTO DIFF WBC: CPT

## 2018-06-08 PROCEDURE — A4216 STERILE WATER/SALINE, 10 ML: HCPCS | Performed by: THORACIC SURGERY (CARDIOTHORACIC VASCULAR SURGERY)

## 2018-06-08 PROCEDURE — 63600175 PHARM REV CODE 636 W HCPCS: Performed by: STUDENT IN AN ORGANIZED HEALTH CARE EDUCATION/TRAINING PROGRAM

## 2018-06-08 PROCEDURE — 83735 ASSAY OF MAGNESIUM: CPT

## 2018-06-08 PROCEDURE — 86850 RBC ANTIBODY SCREEN: CPT

## 2018-06-08 PROCEDURE — 25000003 PHARM REV CODE 250: Performed by: THORACIC SURGERY (CARDIOTHORACIC VASCULAR SURGERY)

## 2018-06-08 PROCEDURE — 20600001 HC STEP DOWN PRIVATE ROOM

## 2018-06-08 PROCEDURE — 97530 THERAPEUTIC ACTIVITIES: CPT

## 2018-06-08 PROCEDURE — 25000003 PHARM REV CODE 250: Performed by: STUDENT IN AN ORGANIZED HEALTH CARE EDUCATION/TRAINING PROGRAM

## 2018-06-08 PROCEDURE — 99232 SBSQ HOSP IP/OBS MODERATE 35: CPT | Mod: ,,, | Performed by: NURSE PRACTITIONER

## 2018-06-08 PROCEDURE — 80053 COMPREHEN METABOLIC PANEL: CPT

## 2018-06-08 PROCEDURE — 25000003 PHARM REV CODE 250: Performed by: NURSE PRACTITIONER

## 2018-06-08 PROCEDURE — P9021 RED BLOOD CELLS UNIT: HCPCS

## 2018-06-08 PROCEDURE — 85610 PROTHROMBIN TIME: CPT

## 2018-06-08 PROCEDURE — 84100 ASSAY OF PHOSPHORUS: CPT

## 2018-06-08 PROCEDURE — 25000003 PHARM REV CODE 250: Performed by: PHYSICIAN ASSISTANT

## 2018-06-08 PROCEDURE — 85018 HEMOGLOBIN: CPT

## 2018-06-08 PROCEDURE — 63600175 PHARM REV CODE 636 W HCPCS: Performed by: NURSE PRACTITIONER

## 2018-06-08 PROCEDURE — 63600175 PHARM REV CODE 636 W HCPCS: Performed by: PHYSICIAN ASSISTANT

## 2018-06-08 PROCEDURE — 85730 THROMBOPLASTIN TIME PARTIAL: CPT | Mod: 91

## 2018-06-08 PROCEDURE — 85014 HEMATOCRIT: CPT

## 2018-06-08 PROCEDURE — 99222 1ST HOSP IP/OBS MODERATE 55: CPT | Mod: ,,, | Performed by: NURSE PRACTITIONER

## 2018-06-08 PROCEDURE — 84132 ASSAY OF SERUM POTASSIUM: CPT

## 2018-06-08 PROCEDURE — 92526 ORAL FUNCTION THERAPY: CPT

## 2018-06-08 PROCEDURE — 85730 THROMBOPLASTIN TIME PARTIAL: CPT

## 2018-06-08 PROCEDURE — 86920 COMPATIBILITY TEST SPIN: CPT

## 2018-06-08 RX ORDER — INSULIN ASPART 100 [IU]/ML
7 INJECTION, SOLUTION INTRAVENOUS; SUBCUTANEOUS
Status: DISCONTINUED | OUTPATIENT
Start: 2018-06-08 | End: 2018-06-08

## 2018-06-08 RX ORDER — ATORVASTATIN CALCIUM 20 MG/1
40 TABLET, FILM COATED ORAL DAILY
Status: DISCONTINUED | OUTPATIENT
Start: 2018-06-08 | End: 2018-06-20 | Stop reason: HOSPADM

## 2018-06-08 RX ORDER — DOXAZOSIN 4 MG/1
4 TABLET ORAL DAILY
Status: DISCONTINUED | OUTPATIENT
Start: 2018-06-08 | End: 2018-06-20 | Stop reason: HOSPADM

## 2018-06-08 RX ORDER — HYDRALAZINE HYDROCHLORIDE 25 MG/1
25 TABLET, FILM COATED ORAL EVERY 8 HOURS
Status: DISCONTINUED | OUTPATIENT
Start: 2018-06-08 | End: 2018-06-20 | Stop reason: HOSPADM

## 2018-06-08 RX ORDER — AMIODARONE HYDROCHLORIDE 200 MG/1
400 TABLET ORAL 2 TIMES DAILY
Status: DISCONTINUED | OUTPATIENT
Start: 2018-06-08 | End: 2018-06-14

## 2018-06-08 RX ORDER — POLYETHYLENE GLYCOL 3350 17 G/17G
17 POWDER, FOR SOLUTION ORAL DAILY
Status: DISCONTINUED | OUTPATIENT
Start: 2018-06-08 | End: 2018-06-20 | Stop reason: HOSPADM

## 2018-06-08 RX ORDER — WARFARIN 2 MG/1
2 TABLET ORAL DAILY
Status: DISCONTINUED | OUTPATIENT
Start: 2018-06-08 | End: 2018-06-09

## 2018-06-08 RX ORDER — HYDROCODONE BITARTRATE AND ACETAMINOPHEN 500; 5 MG/1; MG/1
TABLET ORAL
Status: DISCONTINUED | OUTPATIENT
Start: 2018-06-08 | End: 2018-06-09

## 2018-06-08 RX ORDER — AMLODIPINE BESYLATE 10 MG/1
10 TABLET ORAL DAILY
Status: DISCONTINUED | OUTPATIENT
Start: 2018-06-08 | End: 2018-06-20 | Stop reason: HOSPADM

## 2018-06-08 RX ORDER — RAMELTEON 8 MG/1
8 TABLET ORAL NIGHTLY
Status: DISCONTINUED | OUTPATIENT
Start: 2018-06-08 | End: 2018-06-20 | Stop reason: HOSPADM

## 2018-06-08 RX ORDER — MIRTAZAPINE 15 MG/1
15 TABLET, FILM COATED ORAL NIGHTLY
Status: DISCONTINUED | OUTPATIENT
Start: 2018-06-08 | End: 2018-06-20 | Stop reason: HOSPADM

## 2018-06-08 RX ORDER — NAPROXEN SODIUM 220 MG/1
325 TABLET, FILM COATED ORAL DAILY
Status: DISCONTINUED | OUTPATIENT
Start: 2018-06-08 | End: 2018-06-20 | Stop reason: HOSPADM

## 2018-06-08 RX ORDER — INSULIN ASPART 100 [IU]/ML
7 INJECTION, SOLUTION INTRAVENOUS; SUBCUTANEOUS
Status: DISCONTINUED | OUTPATIENT
Start: 2018-06-09 | End: 2018-06-08

## 2018-06-08 RX ORDER — FAMOTIDINE 20 MG/1
20 TABLET, FILM COATED ORAL DAILY
Status: DISCONTINUED | OUTPATIENT
Start: 2018-06-08 | End: 2018-06-13

## 2018-06-08 RX ORDER — INSULIN ASPART 100 [IU]/ML
4 INJECTION, SOLUTION INTRAVENOUS; SUBCUTANEOUS
Status: COMPLETED | OUTPATIENT
Start: 2018-06-08 | End: 2018-06-08

## 2018-06-08 RX ORDER — OXYCODONE HYDROCHLORIDE 5 MG/1
5 TABLET ORAL EVERY 6 HOURS PRN
Status: DISCONTINUED | OUTPATIENT
Start: 2018-06-08 | End: 2018-06-15

## 2018-06-08 RX ORDER — INSULIN ASPART 100 [IU]/ML
4 INJECTION, SOLUTION INTRAVENOUS; SUBCUTANEOUS
Status: DISCONTINUED | OUTPATIENT
Start: 2018-06-08 | End: 2018-06-08

## 2018-06-08 RX ORDER — AMIODARONE HYDROCHLORIDE 200 MG/1
400 TABLET ORAL 2 TIMES DAILY
Status: DISCONTINUED | OUTPATIENT
Start: 2018-06-08 | End: 2018-06-08

## 2018-06-08 RX ADMIN — INSULIN ASPART 6 UNITS: 100 INJECTION, SOLUTION INTRAVENOUS; SUBCUTANEOUS at 05:06

## 2018-06-08 RX ADMIN — RAMELTEON 8 MG: 8 TABLET, FILM COATED ORAL at 09:06

## 2018-06-08 RX ADMIN — INSULIN ASPART 2 UNITS: 100 INJECTION, SOLUTION INTRAVENOUS; SUBCUTANEOUS at 01:06

## 2018-06-08 RX ADMIN — CALCIUM GLUCONATE: 94 INJECTION, SOLUTION INTRAVENOUS at 12:06

## 2018-06-08 RX ADMIN — OXYCODONE HYDROCHLORIDE 5 MG: 5 TABLET ORAL at 09:06

## 2018-06-08 RX ADMIN — HYDRALAZINE HYDROCHLORIDE 25 MG: 25 TABLET, FILM COATED ORAL at 09:06

## 2018-06-08 RX ADMIN — AMPICILLIN 2 G: 2 INJECTION, POWDER, FOR SOLUTION INTRAVENOUS at 01:06

## 2018-06-08 RX ADMIN — AMPICILLIN 2 G: 2 INJECTION, POWDER, FOR SOLUTION INTRAVENOUS at 08:06

## 2018-06-08 RX ADMIN — Medication 2 MG: at 03:06

## 2018-06-08 RX ADMIN — INSULIN ASPART 4 UNITS: 100 INJECTION, SOLUTION INTRAVENOUS; SUBCUTANEOUS at 06:06

## 2018-06-08 RX ADMIN — INSULIN ASPART 2 UNITS: 100 INJECTION, SOLUTION INTRAVENOUS; SUBCUTANEOUS at 06:06

## 2018-06-08 RX ADMIN — WARFARIN SODIUM 2 MG: 2 TABLET ORAL at 06:06

## 2018-06-08 RX ADMIN — INSULIN ASPART 3 UNITS: 100 INJECTION, SOLUTION INTRAVENOUS; SUBCUTANEOUS at 05:06

## 2018-06-08 RX ADMIN — MIRTAZAPINE 15 MG: 15 TABLET, FILM COATED ORAL at 09:06

## 2018-06-08 RX ADMIN — DOXAZOSIN MESYLATE 4 MG: 2 TABLET ORAL at 11:06

## 2018-06-08 RX ADMIN — HYDRALAZINE HYDROCHLORIDE 25 MG: 25 TABLET, FILM COATED ORAL at 01:06

## 2018-06-08 RX ADMIN — ASPIRIN 81 MG CHEWABLE TABLET 324 MG: 81 TABLET CHEWABLE at 11:06

## 2018-06-08 RX ADMIN — Medication 3 ML: at 01:06

## 2018-06-08 RX ADMIN — DEXTROSE: 5 SOLUTION INTRAVENOUS at 11:06

## 2018-06-08 RX ADMIN — CEFTRIAXONE SODIUM 2 G: 2 INJECTION, POWDER, FOR SOLUTION INTRAMUSCULAR; INTRAVENOUS at 10:06

## 2018-06-08 RX ADMIN — Medication 2 MG: at 11:06

## 2018-06-08 RX ADMIN — Medication 10 ML: at 12:06

## 2018-06-08 RX ADMIN — AMLODIPINE BESYLATE 10 MG: 10 TABLET ORAL at 10:06

## 2018-06-08 RX ADMIN — CEFTRIAXONE SODIUM 2 G: 2 INJECTION, POWDER, FOR SOLUTION INTRAMUSCULAR; INTRAVENOUS at 11:06

## 2018-06-08 RX ADMIN — INSULIN ASPART 7 UNITS: 100 INJECTION, SOLUTION INTRAVENOUS; SUBCUTANEOUS at 01:06

## 2018-06-08 RX ADMIN — POLYETHYLENE GLYCOL 3350 17 G: 17 POWDER, FOR SOLUTION ORAL at 11:06

## 2018-06-08 RX ADMIN — AMIODARONE HYDROCHLORIDE 400 MG: 200 TABLET ORAL at 09:06

## 2018-06-08 RX ADMIN — ATORVASTATIN CALCIUM 40 MG: 20 TABLET, FILM COATED ORAL at 11:06

## 2018-06-08 RX ADMIN — INSULIN ASPART 6 UNITS: 100 INJECTION, SOLUTION INTRAVENOUS; SUBCUTANEOUS at 12:06

## 2018-06-08 RX ADMIN — CALCIUM GLUCONATE: 94 INJECTION, SOLUTION INTRAVENOUS at 10:06

## 2018-06-08 RX ADMIN — AMPICILLIN 2 G: 2 INJECTION, POWDER, FOR SOLUTION INTRAVENOUS at 11:06

## 2018-06-08 RX ADMIN — AMIODARONE HYDROCHLORIDE 400 MG: 200 TABLET ORAL at 11:06

## 2018-06-08 RX ADMIN — AMPICILLIN 2 G: 2 INJECTION, POWDER, FOR SOLUTION INTRAVENOUS at 04:06

## 2018-06-08 NOTE — PT/OT/SLP PROGRESS
Occupational Therapy   Treatment    Name: Rodolfo Messina  MRN: [id number]  Admitting Diagnosis:  S/P MVR (mitral valve replacement)  14 Days Post-Op    Recommendations:     Discharge Recommendations: rehabilitation facility  Discharge Equipment Recommendations:  none  Barriers to discharge:  None    Subjective     Communicated with: RN (Suzanna) prior to session. Pt agreeable to OT treatment session.   Pain/Comfort:  · Pain Rating 1:  (pt reporting pain in PEG site)  · Pain Addressed 1: Reposition, Pre-medicate for activity, Distraction  · Pain Rating Post-Intervention 1:  (pt continuing to report pain)    Patients cultural, spiritual, Sikh conflicts given the current situation: none     Objective:     Patient found with: peripheral IV, lackey catheter, oxygen, telemetry (PEG)    General Precautions: Standard, fall, sternal   Orthopedic Precautions:N/A   Braces: N/A     Occupational Performance:    Bed Mobility:    · Rolling to R: supervision  · Side-lying>sit: mod A   · Maintaining sternal precautions   · Pt able to clear BLE's off EOB  · Assist required for attaining upright trunk position to maintain sternal precautions and protect abdomen from increased pain   · Scooting: SBA  · Anteriorly towards EOB    Functional Mobility/Transfers:  · SPT EOB>chair: min A   · W/o the use of any AD  · Bed in lowest position   · Mod vc's to maintain sternal precautions    Patient left up in chair with all lines intact, call button in reach and RN/kellie present    [facility] 6 Click:  [facility] Total Score: 19    Treatment & Education:  -PEG paused prior to mobility and resumed at cessation  -communicated OT POC  -Educated on importance of OOB mobility and maintaining sternal precautions   -Updated communication board   Education:    Assessment:     Rodolfo Messina is a 67 y.o. male with a medical diagnosis of S/P MVR (mitral valve replacement).  Performance deficits affecting function are weakness, impaired endurance, impaired  self care skills, impaired functional mobilty, gait instability, impaired balance, decreased safety awareness. Pt limited by pain in PEG site during session, agreeable only to t/f to chair. Pt requiring min A for transfer and mod A for bed mobility and continued verbal cues for maintaining sternal precautions. Pt is motivated to engage in therapy session and would benefit from rehab at d/c.      Rehab Prognosis:  good; patient would benefit from acute skilled OT services to address these deficits and reach maximum level of function.       Plan:     Patient to be seen 5 x/week to address the above listed problems via self-care/home management, therapeutic activities, therapeutic exercises  · Plan of Care Expires: 06/30/18  · Plan of Care Reviewed with: patient, grandchild(devon)    This Plan of care has been discussed with the patient who was involved in its development and understands and is in agreement with the identified goals and treatment plan    GOALS:    Occupational Therapy Goals        Problem: Occupational Therapy Goal    Goal Priority Disciplines Outcome Interventions   Occupational Therapy Goal     OT, PT/OT Ongoing (interventions implemented as appropriate)    Description:  Goals to be met by: 6/10/18     Patient will increase functional independence with ADLs by performing:    Feeding with Modified [place].  UE Dressing with Supervision.  LE Dressing with Supervision.  Grooming while standing at sink with Supervision.  Toileting from toilet with Supervision for hygiene and clothing management.   Toilet transfer to toilet with Supervision.                       Time Tracking:     OT Date of Treatment: 06/08/18  OT Start Time: 1500  OT Stop Time: 1515  OT Total Time (min): 15 min    Billable Minutes:Therapeutic Activity 15 minutes    Afsaneh Sheppard OT  6/8/2018

## 2018-06-08 NOTE — PROGRESS NOTES
"[facility]  Endocrinology  Progress Note    Admit Date: 5/23/2018     Reason for Consult: Management of  Hyperglycemia     Surgical Procedure and Date: s/p CABG x 1, MVR  5/25/18; GJ tube placement in IR on 6/7/18     HPI: Patient is a 67 y.o. male with a diagnosis of mitral valve endocarditis and resultant severe MR, TR and pulmonary HTN. The etiology of his endocarditis remains unknown. He was admitted to the SICU pre-operatively for IABP placement 5/23/18. Underwent cardiac surgery 5/25/18.   No personal or family hx of DM  Lab Results   Component Value Date    HGBA1C 5.4 02/22/2018     Endocrine consulted for BG mgmt.             Interval HPI:   Overnight events: remains in CTSU on amiodarone infusion. BG above goal overnight with scheduled novolog and correction scale coverage. GJ tube placed yesterday evening. C/o pain this AM.   Eating:   NPO  Nausea: No  Hypoglycemia and intervention: No  Fever: No  TPN: Yes at 50 cc/hr, decreased to 25 cc/hr this morning per orders  TF: isosource 1.5 at 10 cc/hr x3 currently; no further regimen in orders     /64 (BP Location: Right arm, Patient Position: Lying)   Pulse 61   Temp 97.8 °F (36.6 °C) (Oral)   Resp 18   Ht 5' 10" (1.778 m)   Wt 75.7 kg (166 lb 12.8 oz)   SpO2 (!) 91%   BMI 23.93 kg/m²       Labs Reviewed and Include      Recent Labs  Lab 06/08/18  0513   *   CALCIUM 8.3*   ALBUMIN 2.2*   PROT 5.5*      K 3.5   CO2 24      BUN 60*   CREATININE 1.6*   ALKPHOS 87   ALT 18   AST 12   BILITOT 0.5     Lab Results   Component Value Date    WBC 8.62 06/08/2018    HGB 6.3 (L) 06/08/2018    HCT 20.1 (L) 06/08/2018    MCV 99 (H) 06/08/2018     06/08/2018     No results for input(s): TSH, FREET4 in the last 168 hours.  Lab Results   Component Value Date    HGBA1C 5.4 02/22/2018       Nutritional status:   Body mass index is 23.93 kg/m².  Lab Results   Component Value Date    ALBUMIN 2.2 (L) 06/08/2018    ALBUMIN 2.3 " (L) 06/07/2018    ALBUMIN 2.3 (L) 06/06/2018     Lab Results   Component Value Date    PREALBUMIN 20 06/06/2018    PREALBUMIN 12 (L) 02/22/2018       Estimated Creatinine Clearance: 46.3 mL/min (A) (based on SCr of 1.6 mg/dL (H)).    Accu-Checks  Recent Labs      06/06/18   1955  06/07/18   0026  06/07/18   0425  06/07/18   0848  06/07/18   1240  06/07/18   1845  06/08/18   0019  06/08/18   0021  06/08/18   0532  06/08/18   0830   POCTGLUCOSE  284*  206*  168*  150*  156*  222*  450*  180*  259*  190*       Current Medications and/or Treatments Impacting Glycemic Control  Immunotherapy:  Immunosuppressants     None        Steroids:   Hormones     None        Pressors:    Autonomic Drugs     None        Hyperglycemia/Diabetes Medications: Antihyperglycemics     Start     Stop Route Frequency Ordered    06/06/18 0800  insulin aspart U-100 pen 6 Units      -- SubQ Every 24 hours (non-standard times) 06/05/18 1049    06/06/18 0400  insulin aspart U-100 pen 6 Units      -- SubQ Every 24 hours (non-standard times) 06/05/18 1049    06/06/18 0000  insulin aspart U-100 pen 6 Units      -- SubQ Every 24 hours (non-standard times) 06/05/18 1049    06/05/18 2000  insulin aspart U-100 pen 6 Units      -- SubQ Every 24 hours (non-standard times) 06/05/18 1049    06/05/18 1600  insulin aspart U-100 pen 6 Units      -- SubQ Every 24 hours (non-standard times) 06/05/18 1049    06/05/18 1200  insulin aspart U-100 pen 6 Units      -- SubQ Every 24 hours (non-standard times) 06/05/18 1049    05/30/18 1703  insulin aspart U-100 pen 0-5 Units      -- SubQ Every 4 hours PRN 05/30/18 1705          ASSESSMENT and PLAN    * S/P MVR (mitral valve replacement)    Managed by CTS  avoid hypoglycemia          Acute hyperglycemia    BG goal 140-180 mg/dl    Increase Novolog 7 units Q4hr with TPN  Continue bg monitoring q4 hours and low dose correction scale; times changed for correction scale coverage. Please administer in addition to schedule  novolog if BG elevated with glucose checkings.   Hold scheduled novolog if TPN suspended/discontinued.     ADDENDUM: TPN running x11 hours; no new TPN ordered. Will discontinue scheduled novolog for times after TPN is suspended. Will continue correction scale to assess needs while on TF.     Please page endocrine NP on call with any BG related issues or concerns and with any change in nutrition plans.       Discharge plans-TBD; will depend on nutrition plans.              S/P CABG x 1    Managed by  CTS  avoid hypoglycemia          Chronic systolic congestive heart failure    Per CTS  avoid hypoglycemia            Sequelae of hyperalimentation    May increase insulin needs.   Rate decreased this AM to 25 cc/hr. Will stop after 11 hours.   Enteral nutrition being started.           ANNA (acute kidney injury)    Estimated Creatinine Clearance: 46.3 mL/min (A) (based on SCr of 1.6 mg/dL (H)).  avoid hypoglycemia  Caution with insulin stacking  Lab Results   Component Value Date    CREATININE 1.6 (H) 06/08/2018                   Lynn Canseco NP  Endocrinology  [facility]

## 2018-06-08 NOTE — SUBJECTIVE & OBJECTIVE
"Interval HPI:   Overnight events: remains in CTSU on amiodarone infusion. BG above goal overnight with scheduled novolog and correction scale coverage. GJ tube placed yesterday evening. C/o pain this AM.   Eating:   NPO  Nausea: No  Hypoglycemia and intervention: No  Fever: No  TPN: Yes at 50 cc/hr, decreased to 25 cc/hr this morning per orders  TF: isosource 1.5 at 10 cc/hr x3 currently; no further regimen in orders     /64 (BP Location: Right arm, Patient Position: Lying)   Pulse 61   Temp 97.8 °F (36.6 °C) (Oral)   Resp 18   Ht 5' 10" (1.778 m)   Wt 75.7 kg (166 lb 12.8 oz)   SpO2 (!) 91%   BMI 23.93 kg/m²     Labs Reviewed and Include      Recent Labs  Lab 06/08/18  0513   *   CALCIUM 8.3*   ALBUMIN 2.2*   PROT 5.5*      K 3.5   CO2 24      BUN 60*   CREATININE 1.6*   ALKPHOS 87   ALT 18   AST 12   BILITOT 0.5     Lab Results   Component Value Date    WBC 8.62 06/08/2018    HGB 6.3 (L) 06/08/2018    HCT 20.1 (L) 06/08/2018    MCV 99 (H) 06/08/2018     06/08/2018     No results for input(s): TSH, FREET4 in the last 168 hours.  Lab Results   Component Value Date    HGBA1C 5.4 02/22/2018       Nutritional status:   Body mass index is 23.93 kg/m².  Lab Results   Component Value Date    ALBUMIN 2.2 (L) 06/08/2018    ALBUMIN 2.3 (L) 06/07/2018    ALBUMIN 2.3 (L) 06/06/2018     Lab Results   Component Value Date    PREALBUMIN 20 06/06/2018    PREALBUMIN 12 (L) 02/22/2018       Estimated Creatinine Clearance: 46.3 mL/min (A) (based on SCr of 1.6 mg/dL (H)).    Accu-Checks  Recent Labs      06/06/18   1955  06/07/18   0026  06/07/18   0425  06/07/18   0848  06/07/18   1240  06/07/18   1845  06/08/18   0019  06/08/18   0021  06/08/18   0532  06/08/18   0830   POCTGLUCOSE  284*  206*  168*  150*  156*  222*  450*  180*  259*  190*       Current Medications and/or Treatments Impacting Glycemic Control  Immunotherapy:  Immunosuppressants     None        Steroids:   Hormones     None    "     Pressors:    Autonomic Drugs     None        Hyperglycemia/Diabetes Medications: Antihyperglycemics     Start     Stop Route Frequency Ordered    06/06/18 0800  insulin aspart U-100 pen 6 Units      -- SubQ Every 24 hours (non-standard times) 06/05/18 1049    06/06/18 0400  insulin aspart U-100 pen 6 Units      -- SubQ Every 24 hours (non-standard times) 06/05/18 1049    06/06/18 0000  insulin aspart U-100 pen 6 Units      -- SubQ Every 24 hours (non-standard times) 06/05/18 1049    06/05/18 2000  insulin aspart U-100 pen 6 Units      -- SubQ Every 24 hours (non-standard times) 06/05/18 1049    06/05/18 1600  insulin aspart U-100 pen 6 Units      -- SubQ Every 24 hours (non-standard times) 06/05/18 1049    06/05/18 1200  insulin aspart U-100 pen 6 Units      -- SubQ Every 24 hours (non-standard times) 06/05/18 1049    05/30/18 1703  insulin aspart U-100 pen 0-5 Units      -- SubQ Every 4 hours PRN 05/30/18 1702

## 2018-06-08 NOTE — PLAN OF CARE
TRENT left a message for admissions at  rehab ([phone]) requesting an update on the referral.  TRENT sent a message through [facility] to them also.  TRENT sent a message to [facility] also.  TRENT will f/u on Monday.    Radha Montes, [facility] x [phone]

## 2018-06-08 NOTE — ASSESSMENT & PLAN NOTE
-h/o mitral valve endocarditis and resultant severe MR, TR and pulmonary HTN  -Heart Transplant services following  -s/p MVR, CABG x 1 on 5/25  -BB held

## 2018-06-08 NOTE — SUBJECTIVE & OBJECTIVE
Interval History: No acute events overnight. NSR on monitor. PEG placed yesterday. TF initiated.     Medications:  Continuous Infusions:   dextrose 5 % 50 mL/hr at 06/08/18 1149    heparin (porcine) in 5 % dex 500 Units/hr (06/07/18 1959)    TPN ADULT CENTRAL LINE CUSTOM 25 mL/hr at 06/08/18 1200     Scheduled Meds:   amiodarone  400 mg Per G Tube BID    amLODIPine  10 mg Per G Tube Daily    ampicillin IVPB  2 g Intravenous Q6H    aspirin  324 mg Per G Tube Daily    atorvastatin  40 mg Per G Tube Daily    cefTRIAXone (ROCEPHIN) IVPB  2 g Intravenous Q12H    doxazosin  4 mg Per G Tube Daily    famotidine  20 mg Per G Tube Daily    hydrALAZINE  25 mg Per G Tube Q8H    insulin aspart U-100  4 Units Subcutaneous Q24H    mirtazapine  15 mg Per G Tube QHS    polyethylene glycol  17 g Per G Tube Daily    ramelteon  8 mg Per G Tube QHS    sodium chloride 0.9%  10 mL Intravenous Q6H    sodium chloride 0.9%  3 mL Intravenous Q8H     PRN Meds:sodium chloride, albumin human 5%, albuterol-ipratropium, bisacodyl, dextrose 50%, docusate, glucagon (human recombinant), hydrALAZINE, insulin aspart U-100, lactated ringers, metoclopramide HCl, morphine, ondansetron, oxyCODONE, Flushing PICC Protocol **AND** sodium chloride 0.9% **AND** sodium chloride 0.9%, white petrolatum-mineral oil     Objective:     Vital Signs (Most Recent):  Temp: 98.1 °F (36.7 °C) (06/08/18 0830)  Pulse: 60 (06/08/18 1100)  Resp: 18 (06/08/18 0830)  BP: (!) 136/93 (06/08/18 1100)  SpO2: (!) 94 % (06/08/18 1100) Vital Signs (24h Range):  Temp:  [97.6 °F (36.4 °C)-98.1 °F (36.7 °C)] 98.1 °F (36.7 °C)  Pulse:  [58-68] 60  Resp:  [12-22] 18  SpO2:  [91 %-98 %] 94 %  BP: (109-141)/(29-93) 136/93     Weight: 75.7 kg (166 lb 12.8 oz)  Body mass index is 23.93 kg/m².    SpO2: (!) 94 %  O2 Device (Oxygen Therapy): nasal cannula    Intake/Output - Last 3 Shifts       06/06 0700 - 06/07 0659 06/07 0700 - 06/08 0659 06/08 0700 - 06/09 0659    P.O. 0 0      I.V. (mL/kg) 560.1 (7.3) 712.2 (9.4)     IV Piggyback 250 500     .2 753.3     Total Intake(mL/kg) 1582.2 (20.5) 1965.5 (26)     Urine (mL/kg/hr) 1300 (0.7) 2375 (1.3)     Total Output 1300 2375      Net +282.2 -409.5                   Lines/Drains/Airways     Peripherally Inserted Central Catheter Line                 PICC Double Lumen 03/05/18 1539 right basilic 94 days         PICC Double Lumen 06/04/18 1654 left brachial 3 days          Central Venous Catheter Line                 Percutaneous Central Line Insertion/Assessment - Quad lumen  05/25/18 0800 left subclavian 14 days          Drain                 Urethral Catheter 06/06/18 0030 2 days         Gastrostomy/Enterostomy 06/07/18 1657 Gastrostomy-jejunostomy LUQ less than 1 day          Line                 Pacer Wires 05/25/18 1355 14 days          Peripheral Intravenous Line                 Peripheral IV - Single Lumen 06/05/18 1300 Right Hand 3 days         Peripheral IV - Single Lumen 06/07/18 2236 Right Antecubital less than 1 day                Physical Exam   Constitutional: He is oriented to person, place, and time. He appears cachectic.   HENT:   Head: Normocephalic and atraumatic.   Eyes: Pupils are equal, round, and reactive to light.   Neck: Normal range of motion. Neck supple.   Cardiovascular: Normal rate, regular rhythm and normal heart sounds.    Pulmonary/Chest: Effort normal. He has rales.   Abdominal: Soft. Bowel sounds are normal.   Musculoskeletal: Normal range of motion.   Neurological: He is alert and oriented to person, place, and time.   Skin: Skin is warm and dry.   Psychiatric: He has a normal mood and affect. His behavior is normal.       Significant Labs:  CBC:   Recent Labs  Lab 06/08/18  0513   WBC 8.62   RBC 1.99*   HGB 6.3*   HCT 20.1*      MCV 99*   MCH 31.2*   MCHC 31.6*     CMP:   Recent Labs  Lab 06/08/18  0513   *   CALCIUM 8.3*   ALBUMIN 2.2*   PROT 5.5*      K 3.5   CO2 24      BUN  60*   CREATININE 1.6*   ALKPHOS 87   ALT 18   AST 12   BILITOT 0.5       Significant Diagnostics:  CXR: I have reviewed all pertinent results/findings within the past 24 hours

## 2018-06-08 NOTE — ASSESSMENT & PLAN NOTE
BG goal 140-180 mg/dl    Increase Novolog 7 units Q4hr with TPN  Continue bg monitoring q4 hours and low dose correction scale; times changed for correction scale coverage. Please administer in addition to schedule novolog if BG elevated with glucose checkings.   Hold scheduled novolog if TPN suspended/discontinued.     ADDENDUM: TPN running x11 hours; no new TPN ordered. Will discontinue scheduled novolog for times after TPN is suspended. Will continue correction scale to assess needs while on TF.     Please page endocrine NP on call with any BG related issues or concerns and with any change in nutrition plans.       Discharge plans-TBD; will depend on nutrition plans.

## 2018-06-08 NOTE — NURSING
PARTHA Fernandez, NP TORB to call MD for PTTs greater than 80, otherwise the Heparin gtt is non-titrating, so no PTT goal. Patient resting comfortably, NP orders to administer Fentanyl as needed for pain x1; dilute in 10ml NS and push over at least 2 minutes. Will continue to monitor.

## 2018-06-08 NOTE — PT/OT/SLP PROGRESS
Occupational Therapy      Patient Name:  Rodolfo Messina   MRN:  [id number]    Attempted to see patient for OT treatment session x2 attempts. Pt reporting increased pain and fatigue during both attempts in AM and PM. Will follow up over the weekend.     Afsaneh Sheppard, OT  6/8/2018

## 2018-06-08 NOTE — ASSESSMENT & PLAN NOTE
Estimated Creatinine Clearance: 46.3 mL/min (A) (based on SCr of 1.6 mg/dL (H)).  avoid hypoglycemia  Caution with insulin stacking  Lab Results   Component Value Date    CREATININE 1.6 (H) 06/08/2018

## 2018-06-08 NOTE — PLAN OF CARE
Problem: Patient Care Overview  Goal: Plan of Care Review  Outcome: Ongoing (interventions implemented as appropriate)  Plan of care discussed with patient, no new questions at this time. VSS, denies SOB, pain well managed. Pt s/p Jtube placement. Pt currently NPO. TPN/Amio/heparin/D5 gtts all continued. Safety precautions taken, no falls/trauma during the shift. Will continue to monitor.

## 2018-06-08 NOTE — HPI
Rodolfo Messina is a 67-year-old male with PMHx of ANNA, arthritis, BPH, HTN, CAD, mitral valve endocarditis and resultant severe MR, TR and pulmonary HTN.  [place] [facility] [place] IABP placement with MVR, TV repair, AVR and CABG which was successful on 5/25.   Hospital course further complicated by cardioversion (on 5/28), hypotension, ANNA, dysphagia (was on TPN, now s/p GJ tube on 6/7), epistaxis s/p NGT attempt, anemia, A-fib (Amiodarone gtt d/c'd), elevated magnesium.  He is currently on a D5 and Heparin gtt withTPN still infusing. Heart transplant services following.     Functional History: Patient lives in [place] with wife and grandson in a single story home with no steps to enter.  Prior to admission, (I) with ADLs and mobility.  DME: none.

## 2018-06-08 NOTE — ASSESSMENT & PLAN NOTE
May increase insulin needs.   Rate decreased this AM to 25 cc/hr. Will stop after 11 hours.   Enteral nutrition being started.

## 2018-06-08 NOTE — SUBJECTIVE & OBJECTIVE
Past Medical History:   Diagnosis Date    ANNA (acute kidney injury) 2/22/2018    Anemia     Anxiety     Arthritis     BPH (benign prostatic hyperplasia)     CHF (congestive heart failure) 5/23/2018    Coronary artery disease of native artery of native heart with stable angina pectoris 5/21/2018    Diverticulosis     Encounter for blood transfusion     Hypertension     Urinary retention      Past Surgical History:   Procedure Laterality Date    APPENDECTOMY      COLONOSCOPY      COLONOSCOPY N/A 2/1/2018    Procedure: COLONOSCOPY;  Surgeon: Richar Payan MD;  Location: [facility] (4TH FLR);  Service: Endoscopy;  Laterality: N/A;  PM prep    CREATION OF AORTOCORONARY ARTERY BYPASS N/A 5/25/2018    Procedure: AORTOCORONARY BYPASS-CABG;  Surgeon: Marvin Go MD;  Location: [facility] OR 2ND FLR;  Service: Cardiovascular;  Laterality: N/A;    EYE SURGERY Right     cataract extraction    FINGER SURGERY      FRACTURE SURGERY Right     index finger    MITRAL VALVE REPLACEMENT N/A 5/25/2018    Procedure: Mitral Valve Replacement;  Surgeon: Marvin Go MD;  [facility];  Service: Cardiovascular;  Laterality: N/A;    TONSILLECTOMY       Review of patient's allergies indicates:   Allergen Reactions    Shellfish containing products     Augmentin [amoxicillin-pot clavulanate]      Patient felt that it raised BP and requested to have it added as intolerance. Tolerated unasyn and ampicillin.    Ciprofloxacin     Flagyl [metronidazole]     Lisinopril Other (See Comments)     cough    Mysoline [primidone]     Omnicef [cefdinir]        Scheduled Medications:    amiodarone  400 mg Per G Tube BID    amLODIPine  10 mg Per G Tube Daily    ampicillin IVPB  2 g Intravenous Q6H    aspirin  324 mg Per G Tube Daily    atorvastatin  40 mg Per G Tube Daily    cefTRIAXone (ROCEPHIN) IVPB  2 g Intravenous Q12H    doxazosin  4 mg Per G Tube Daily    famotidine  20 mg Per G Tube Daily     hydrALAZINE  25 mg Per G Tube Q8H    [START ON 6/9/2018] insulin aspart U-100  7 Units Subcutaneous Q24H    [START ON 6/9/2018] insulin aspart U-100  7 Units Subcutaneous Q24H    [START ON 6/9/2018] insulin aspart U-100  7 Units Subcutaneous Q24H    insulin aspart U-100  7 Units Subcutaneous Q24H    insulin aspart U-100  7 Units Subcutaneous Q24H    insulin aspart U-100  7 Units Subcutaneous Q24H    mirtazapine  15 mg Per G Tube QHS    polyethylene glycol  17 g Per G Tube Daily    ramelteon  8 mg Per G Tube QHS    sodium chloride 0.9%  10 mL Intravenous Q6H    sodium chloride 0.9%  3 mL Intravenous Q8H       PRN Medications: sodium chloride, albumin human 5%, albuterol-ipratropium, bisacodyl, dextrose 50%, docusate, glucagon (human recombinant), hydrALAZINE, insulin aspart U-100, lactated ringers, metoclopramide HCl, morphine, ondansetron, oxyCODONE, Flushing PICC Protocol **AND** sodium chloride 0.9% **AND** sodium chloride 0.9%, white petrolatum-mineral oil    Family History     Problem Relation (Age of Onset)    Heart disease Mother, Father    No Known Problems Sister, Brother    Stroke Mother        Social History Main Topics    Smoking status: Never Smoker    Smokeless tobacco: Never Used    Alcohol use No      Comment: none recently    Drug use: No    Sexual activity: Not Currently     Partners: Female     Review of Systems   Constitutional: Positive for fatigue. Negative for chills and fever.   HENT: Positive for trouble swallowing. Negative for voice change.    Eyes: Negative for photophobia and visual disturbance.   Respiratory: Positive for shortness of breath. Negative for cough and wheezing.    Cardiovascular: Negative for chest pain and palpitations.   Gastrointestinal: Positive for constipation. Negative for abdominal distention, nausea and vomiting.   Genitourinary: Negative for difficulty urinating and flank pain.   Musculoskeletal: Positive for gait problem. Negative for arthralgias  and myalgias.   Skin: Negative for color change and rash.   Neurological: Positive for weakness. Negative for speech difficulty, numbness and headaches.   Psychiatric/Behavioral: Negative for agitation and confusion.     Objective:     Vital Signs (Most Recent):  Temp: 98.1 °F (36.7 °C) (06/08/18 0830)  Pulse: 60 (06/08/18 1100)  Resp: 18 (06/08/18 0830)  BP: (!) 136/93 (06/08/18 1100)  SpO2: (!) 94 % (06/08/18 1100)    Vital Signs (24h Range):  Temp:  [97.6 °F (36.4 °C)-98.1 °F (36.7 °C)] 98.1 °F (36.7 °C)  Pulse:  [58-68] 60  Resp:  [12-22] 18  SpO2:  [91 %-98 %] 94 %  BP: (109-141)/(29-93) 136/93     Body mass index is 23.93 kg/m².    Physical Exam   Constitutional: He is oriented to person, place, and time. He appears well-developed.   Thin  Wife at bedside   HENT:   Head: Normocephalic and atraumatic.   Eyes: EOM are normal. Pupils are equal, round, and reactive to light.   Neck: Neck supple.   Cardiovascular: Normal rate and regular rhythm.    Pulmonary/Chest: Effort normal. No respiratory distress.   Abdominal: Soft. There is no tenderness.   JG tube noted   Musculoskeletal: Normal range of motion. He exhibits no deformity.   Neurological: He is alert and oriented to person, place, and time. No sensory deficit. He exhibits normal muscle tone.   -no focal weakness   Skin: Skin is warm and dry.   Psychiatric: He has a normal mood and affect. His behavior is normal.     NEUROLOGICAL EXAMINATION:     MENTAL STATUS   Oriented to person, place, and time.     CRANIAL NERVES     CN III, IV, VI   Pupils are equal, round, and reactive to light.  Extraocular motions are normal.       Diagnostic Results:   Labs: Reviewed  ECG: Reviewed  X-Ray: Reviewed  Echo:reviewed

## 2018-06-08 NOTE — CONSULTS
[facility]  Physical Medicine & Rehab  Consult Note    Patient Name: Rodolfo Messina  MRN: [id number]  Admission Date: 5/23/2018  Hospital Length of Stay: 16 days  Attending Physician: aMrvin Go MD     Inpatient consult to Physical Medicine & Rehabilitation  Consult performed by: Mary Storm NP  Consult requested by:  Marvin Go MD    Reason for Consult:  assess rehabilitation needs  Consults  Subjective:     Principal Problem: S/P MVR (mitral valve replacement)    HPI: Rodolfo Messina is a 67-year-old male with PMHx of ANNA, arthritis, BPH, HTN, CAD, mitral valve endocarditis and resultant severe MR, TR and pulmonary HTN.  Patient [place] [facility] [place]23 for scheduled IABP placement with MVR, TV repair, AVR and CABG which was successful on 5/25.   Hospital course further complicated by cardioversion (on 5/28), hypotension, ANNA, dysphagia (was on TPN, now s/p GJ tube on 6/7), epistaxis s/p NGT attempt, anemia, A-fib (Amiodarone gtt d/c'd), elevated magnesium.  He is currently on a D5 and Heparin gtt withTPN still infusing. Heart transplant services following.     Functional History: Patient lives in [place] with wife and grandson in a single story home with no steps to enter.  Prior to admission, (I) with ADLs and mobility.  DME: none.     Hospital Course: 5/29/18: Evaluated by therapy.   Sit to stand Shereen-CGA with dizziness.  Ambulated total 20 ft Shereen.  UBD ModA.  LBD/toileting MaxA. Grooming Shereen.   6/7/18: Participating with therapy. Bed mobility CGA.  Sit to stand CGA.  Ambulated 48 ft CGA with SOB.  Grooming CGA.     Past Medical History:   Diagnosis Date    ANNA (acute kidney injury) 2/22/2018    Anemia     Anxiety     Arthritis     BPH (benign prostatic hyperplasia)     CHF (congestive heart failure) 5/23/2018    Coronary artery disease of native artery of native heart with stable angina pectoris 5/21/2018    Diverticulosis     Encounter for blood transfusion      Hypertension     Urinary retention      Past Surgical History:   Procedure Laterality Date    APPENDECTOMY      COLONOSCOPY      COLONOSCOPY N/A 2/1/2018    Procedure: COLONOSCOPY;  Surgeon: Richar Payan MD;  Location: [facility] (4TH FLR);  Service: Endoscopy;  Laterality: N/A;  PM prep    CREATION OF AORTOCORONARY ARTERY BYPASS N/A 5/25/2018    Procedure: AORTOCORONARY BYPASS-CABG;  Surgeon: Marvin Go MD;  Location: [facility] OR 2ND FLR;  Service: Cardiovascular;  Laterality: N/A;    EYE SURGERY Right     cataract extraction    FINGER SURGERY      FRACTURE SURGERY Right     index finger    MITRAL VALVE REPLACEMENT N/A 5/25/2018    Procedure: Mitral Valve Replacement;  Surgeon: Marvin Go MD;  [facility];  Service: Cardiovascular;  Laterality: N/A;    TONSILLECTOMY       Review of patient's allergies indicates:   Allergen Reactions    Shellfish containing products     Augmentin [amoxicillin-pot clavulanate]      Patient felt that it raised BP and requested to have it added as intolerance. Tolerated unasyn and ampicillin.    Ciprofloxacin     Flagyl [metronidazole]     Lisinopril Other (See Comments)     cough    Mysoline [primidone]     Omnicef [cefdinir]        Scheduled Medications:    amiodarone  400 mg Per G Tube BID    amLODIPine  10 mg Per G Tube Daily    ampicillin IVPB  2 g Intravenous Q6H    aspirin  324 mg Per G Tube Daily    atorvastatin  40 mg Per G Tube Daily    cefTRIAXone (ROCEPHIN) IVPB  2 g Intravenous Q12H    doxazosin  4 mg Per G Tube Daily    famotidine  20 mg Per G Tube Daily    hydrALAZINE  25 mg Per G Tube Q8H    [START ON 6/9/2018] insulin aspart U-100  7 Units Subcutaneous Q24H    [START ON 6/9/2018] insulin aspart U-100  7 Units Subcutaneous Q24H    [START ON 6/9/2018] insulin aspart U-100  7 Units Subcutaneous Q24H    insulin aspart U-100  7 Units Subcutaneous Q24H    insulin aspart U-100  7 Units Subcutaneous Q24H    insulin aspart  U-100  7 Units Subcutaneous Q24H    mirtazapine  15 mg Per G Tube QHS    polyethylene glycol  17 g Per G Tube Daily    ramelteon  8 mg Per G Tube QHS    sodium chloride 0.9%  10 mL Intravenous Q6H    sodium chloride 0.9%  3 mL Intravenous Q8H       PRN Medications: sodium chloride, albumin human 5%, albuterol-ipratropium, bisacodyl, dextrose 50%, docusate, glucagon (human recombinant), hydrALAZINE, insulin aspart U-100, lactated ringers, metoclopramide HCl, morphine, ondansetron, oxyCODONE, Flushing PICC Protocol **AND** sodium chloride 0.9% **AND** sodium chloride 0.9%, white petrolatum-mineral oil    Family History     Problem Relation (Age of Onset)    Heart disease Mother, Father    No Known Problems Sister, Brother    Stroke Mother        Social History Main Topics    Smoking status: Never Smoker    Smokeless tobacco: Never Used    Alcohol use No      Comment: none recently    Drug use: No    Sexual activity: Not Currently     Partners: Female     Review of Systems   Constitutional: Positive for fatigue. Negative for chills and fever.   HENT: Positive for trouble swallowing. Negative for voice change.    Eyes: Negative for photophobia and visual disturbance.   Respiratory: Positive for shortness of breath. Negative for cough and wheezing.    Cardiovascular: Negative for chest pain and palpitations.   Gastrointestinal: Positive for constipation. Negative for abdominal distention, nausea and vomiting.   Genitourinary: Negative for difficulty urinating and flank pain.   Musculoskeletal: Positive for gait problem. Negative for arthralgias and myalgias.   Skin: Negative for color change and rash.   Neurological: Positive for weakness. Negative for speech difficulty, numbness and headaches.   Psychiatric/Behavioral: Negative for agitation and confusion.     Objective:     Vital Signs (Most Recent):  Temp: 98.1 °F (36.7 °C) (06/08/18 0830)  Pulse: 60 (06/08/18 1100)  Resp: 18 (06/08/18 0830)  BP: (!) 136/93  (06/08/18 1100)  SpO2: (!) 94 % (06/08/18 1100)    Vital Signs (24h Range):  Temp:  [97.6 °F (36.4 °C)-98.1 °F (36.7 °C)] 98.1 °F (36.7 °C)  Pulse:  [58-68] 60  Resp:  [12-22] 18  SpO2:  [91 %-98 %] 94 %  BP: (109-141)/(29-93) 136/93     Body mass index is 23.93 kg/m².    Physical Exam   Constitutional: He is oriented to person, place, and time. He appears well-developed.   Thin  Wife at bedside   HENT:   Head: Normocephalic and atraumatic.   Eyes: EOM are normal. Pupils are equal, round, and reactive to light.   Neck: Neck supple.   Cardiovascular: Normal rate and regular rhythm.    Pulmonary/Chest: Effort normal. No respiratory distress.   Abdominal: Soft. There is no tenderness.   JG tube noted   Musculoskeletal: Normal range of motion. He exhibits no deformity.   Neurological: He is alert and oriented to person, place, and time. No sensory deficit. He exhibits normal muscle tone.   -no focal weakness   Skin: Skin is warm and dry.   Psychiatric: He has a normal mood and affect. His behavior is normal.          Diagnostic Results:   Labs: Reviewed  ECG: Reviewed  X-Ray: Reviewed  Echo:reviewed    Assessment/Plan:     * S/P MVR (mitral valve replacement)    -h/o mitral valve endocarditis and resultant severe MR, TR and pulmonary HTN  -Heart Transplant services following  -s/p MVR, CABG x 1 on 5/25  -BB held         Impaired functional mobility and endurance    -2/2 MV replacement and complicated hospital course      See hospital course for functional, cognitive/speech/language, and nutrition/swallow status.      Recommendations  -  Encourage mobility, OOB in chair at least 3 hours per day, and early ambulation as appropriate  -  PT/OT evaluate and treat  -  Pain management  -  Monitor for and prevent skin breakdown and pressure ulcers  · Early mobility, repositioning/weight shifting every 20-30 minutes when sitting, turn patient every 2 hours, proper mattress/overlay and chair cushioning, pressure relief/heel  protector boots  -  DVT prophylaxis:  Heparin gtt   -  Reviewed discharge options (IP rehab, SNF, HH therapy, and OP therapy)          On enteral nutrition    -TPN still on MAR  -GJ tube placed 6/7        Atrial flutter    -EP consulted  - on Amiodarone gtt        S/P CABG x 1    -s/p 5/25        ANNA (acute kidney injury)    -Cr. 1.6/BUN 60  -Lasix held         Symptomatic anemia    -H/H 6.3/20.1 on 6/8/18        Patient is not medically stable for discharge (Heparin gtt infusing).  Participating and performing well with therapy. Patient states he would likely be able to tolerate 3 hours of therapy/day.  [facility] not in network with patient's insurance.  Will follow progress and discuss with rehab team for rehab recommendation.      Thank you for your consult.     Mary Storm NP  Department of Physical Medicine & Rehab  [facility]

## 2018-06-08 NOTE — PLAN OF CARE
Problem: Occupational Therapy Goal  Goal: Occupational Therapy Goal  Goals to be met by: 6/10/18     Patient will increase functional independence with ADLs by performing:    Feeding with Modified [place].  UE Dressing with Supervision.  LE Dressing with Supervision.  Grooming while standing at sink with Supervision.  Toileting from toilet with Supervision for hygiene and clothing management.   Toilet transfer to toilet with Supervision.      Outcome: Ongoing (interventions implemented as appropriate)  Goals reviewed and remain appropriate.

## 2018-06-08 NOTE — HOSPITAL COURSE
5/29/18: Evaluated by therapy.   Sit to stand Shereen-CGA with dizziness.  Ambulated total 20 ft Shereen.  UBD ModA.  LBD/toileting MaxA. Grooming Shereen.   6/7/18: Participating with therapy. Bed mobility CGA.  Sit to stand CGA.  Ambulated 48 ft CGA with SOB.  Grooming CGA.   6/8/18:  Declined PT and OT initially.  Then, after OT's 2nd attempt participated. Bed mobility SV-ModA  Sit to stand Shereen.

## 2018-06-08 NOTE — NURSING TRANSFER
Nursing Transfer Note      6/7/2018     Transfer From: IR    Transfer via stretcher    Transfer with cardiac monitoring    Transported by transport    Medicines sent: none    Chart send with patient: No    Notified: spouse    Patient reassessed at: 6/7/2018 at 1850    Upon arrival to floor: cardiac monitor applied, patient oriented to room, call bell in reach and bed in lowest position    No acute distress, patient able to ambulate to bed from stretcher. Complaint of LLQ pain, however fell comfortably asleep. G/J tube site asymptomatic with a small amount of bloody drainage.

## 2018-06-08 NOTE — PLAN OF CARE
Problem: SLP Goal  Goal: SLP Goal  Speech Language Pathology Goals  Goals expected to be met by 6/11  1. Pt will tolerate puree & honey thickened liquids for pleasure while completing 2-3 swallows per bolus w/o overt S/S aspiration, MIN A   3. Educate Pt and family on safe swallow strategies and S/S aspiration   4. Continue to assess swallow to determine feasibility of PO upgrade   5. Pt will complete dysphagia exercises given min A      Speech Language Pathology Goals  Goals expected to be met by 6/7:  1. Pt will tolerate trials of puree w/o overt S/S aspiration, MIN A - CONTINUE  2. Pt will tolerate trials of nectar-thickened liquids w/o overt S/S aspiration, MIN A - DISCONTINUE  3. Educate Pt and family on safe swallow strategies and S/S aspiration - CONTINUE  4. Continue to assess swallow to determine feasibility of PO upgrade - CONTINUE      Goals expected to be met by 6/4: goals not met 2/2 transfer to ICU 5/30  1. Pt will participate in ongoing swallowing assessment to determine safest and least restrictive diet.          Outcome: Ongoing (interventions implemented as appropriate)  Plans of care remain appropriate, cont POC. IRVIN Colby, CCC/SLP  6/8/2018

## 2018-06-08 NOTE — ASSESSMENT & PLAN NOTE
-2/2 MV replacement and complicated hospital course      See hospital course for functional, cognitive/speech/language, and nutrition/swallow status.      Recommendations  -  Encourage mobility, OOB in chair at least 3 hours per day, and early ambulation as appropriate  -  PT/OT evaluate and treat  -  Pain management  -  Monitor for and prevent skin breakdown and pressure ulcers  · Early mobility, repositioning/weight shifting every 20-30 minutes when sitting, turn patient every 2 hours, proper mattress/overlay and chair cushioning, pressure relief/heel protector boots  -  DVT prophylaxis:  Heparin gtt   -  Reviewed discharge options (IP rehab, SNF, HH therapy, and OP therapy)

## 2018-06-08 NOTE — PT/OT/SLP PROGRESS
Physical Therapy      Patient Name:  Rodolfo Messina   MRN:  [id number]    Patient not seen today secondary to  (AM and PM pt refused 2nd to being tried and in pain from getting PEG placed. ). Will follow-up at a later date..    Maria T Mazariegos, PT

## 2018-06-08 NOTE — PROGRESS NOTES
[facility]  Cardiothoracic Surgery  Progress Note    Patient Name: Rodolfo Messina  MRN: [id number]  Admission Date: 5/23/2018  Hospital Length of Stay: 16 days  Code Status: Full Code   Attending Physician: Marvin Go MD   Referring Provider: Marvin Go MD  Principal Problem:S/P MVR (mitral valve replacement)    Subjective:     Post-Op Info:  Procedure(s) (LRB):  Mitral Valve Replacement (N/A)  AORTOCORONARY BYPASS-CABG (N/A)   14 Days Post-Op     Interval History: No acute events overnight. NSR on monitor. PEG placed yesterday. TF initiated.     Medications:  Continuous Infusions:   dextrose 5 % 50 mL/hr at 06/08/18 1149    heparin (porcine) in 5 % dex 500 Units/hr (06/07/18 1959)    TPN ADULT CENTRAL LINE CUSTOM 25 mL/hr at 06/08/18 1200     Scheduled Meds:   amiodarone  400 mg Per G Tube BID    amLODIPine  10 mg Per G Tube Daily    ampicillin IVPB  2 g Intravenous Q6H    aspirin  324 mg Per G Tube Daily    atorvastatin  40 mg Per G Tube Daily    cefTRIAXone (ROCEPHIN) IVPB  2 g Intravenous Q12H    doxazosin  4 mg Per G Tube Daily    famotidine  20 mg Per G Tube Daily    hydrALAZINE  25 mg Per G Tube Q8H    insulin aspart U-100  4 Units Subcutaneous Q24H    mirtazapine  15 mg Per G Tube QHS    polyethylene glycol  17 g Per G Tube Daily    ramelteon  8 mg Per G Tube QHS    sodium chloride 0.9%  10 mL Intravenous Q6H    sodium chloride 0.9%  3 mL Intravenous Q8H     PRN Meds:sodium chloride, albumin human 5%, albuterol-ipratropium, bisacodyl, dextrose 50%, docusate, glucagon (human recombinant), hydrALAZINE, insulin aspart U-100, lactated ringers, metoclopramide HCl, morphine, ondansetron, oxyCODONE, Flushing PICC Protocol **AND** sodium chloride 0.9% **AND** sodium chloride 0.9%, white petrolatum-mineral oil     Objective:     Vital Signs (Most Recent):  Temp: 98.1 °F (36.7 °C) (06/08/18 0830)  Pulse: 60 (06/08/18 1100)  Resp: 18 (06/08/18 0830)  BP: (!) 136/93  (06/08/18 1100)  SpO2: (!) 94 % (06/08/18 1100) Vital Signs (24h Range):  Temp:  [97.6 °F (36.4 °C)-98.1 °F (36.7 °C)] 98.1 °F (36.7 °C)  Pulse:  [58-68] 60  Resp:  [12-22] 18  SpO2:  [91 %-98 %] 94 %  BP: (109-141)/(29-93) 136/93     Weight: 75.7 kg (166 lb 12.8 oz)  Body mass index is 23.93 kg/m².    SpO2: (!) 94 %  O2 Device (Oxygen Therapy): nasal cannula    Intake/Output - Last 3 Shifts       06/06 0700 - 06/07 0659 06/07 0700 - 06/08 0659 06/08 0700 - 06/09 0659    P.O. 0 0     I.V. (mL/kg) 560.1 (7.3) 712.2 (9.4)     IV Piggyback 250 500     .2 753.3     Total Intake(mL/kg) 1582.2 (20.5) 1965.5 (26)     Urine (mL/kg/hr) 1300 (0.7) 2375 (1.3)     Total Output 1300 2375      Net +282.2 -409.5                   Lines/Drains/Airways     Peripherally Inserted Central Catheter Line                 PICC Double Lumen 03/05/18 1539 right basilic 94 days         PICC Double Lumen 06/04/18 1654 left brachial 3 days          Central Venous Catheter Line                 Percutaneous Central Line Insertion/Assessment - Quad lumen  05/25/18 0800 left subclavian 14 days          Drain                 Urethral Catheter 06/06/18 0030 2 days         Gastrostomy/Enterostomy 06/07/18 1657 Gastrostomy-jejunostomy LUQ less than 1 day          Line                 Pacer Wires 05/25/18 1355 14 days          Peripheral Intravenous Line                 Peripheral IV - Single Lumen 06/05/18 1300 Right Hand 3 days         Peripheral IV - Single Lumen 06/07/18 2236 Right Antecubital less than 1 day                Physical Exam   Constitutional: He is oriented to person, place, and time. He appears cachectic.   HENT:   Head: Normocephalic and atraumatic.   Eyes: Pupils are equal, round, and reactive to light.   Neck: Normal range of motion. Neck supple.   Cardiovascular: Normal rate, regular rhythm and normal heart sounds.    Pulmonary/Chest: Effort normal. He has rales.   Abdominal: Soft. Bowel sounds are normal.   Musculoskeletal:  Normal range of motion.   Neurological: He is alert and oriented to person, place, and time.   Skin: Skin is warm and dry.   Psychiatric: He has a normal mood and affect. His behavior is normal.       Significant Labs:  CBC:   Recent Labs  Lab 06/08/18  0513   WBC 8.62   RBC 1.99*   HGB 6.3*   HCT 20.1*      MCV 99*   MCH 31.2*   MCHC 31.6*     CMP:   Recent Labs  Lab 06/08/18  0513   *   CALCIUM 8.3*   ALBUMIN 2.2*   PROT 5.5*      K 3.5   CO2 24      BUN 60*   CREATININE 1.6*   ALKPHOS 87   ALT 18   AST 12   BILITOT 0.5       Significant Diagnostics:  CXR: I have reviewed all pertinent results/findings within the past 24 hours    Assessment/Plan:     * S/P MVR (mitral valve replacement)    --Continue ASA, statin (holding BB)  --Sternal precautions  --PT/OT  --Ambulate x4   --Wean O2 as tolerated for O2 sat >92%  --monitor CXR    --Monitor electrolytes and replace prn  --hold Lasix  --D5W @50  --continue  lackey   --strict I&Os  --heparin bridge, resume coumadin         DISPO: discharge planning on going for rehab early next week           Dysphagia    --SLP following  --failed last BMS study   --GJ tube placed by IR 6/7  --initiate TF 10ml/hr        Impaired functional mobility and endurance    --discharging to rehab  --PT/OT daily         On enteral nutrition    --continue TPN with lipids   --GJ tube placed- 6/7  --TF at 10ml/hr for now to the J tube       Epistaxis    --Noted to have post traumatic left sided epistaxis following attempted NGT placement. On the time of evaluation patient was hemostatic. No active bleeding noted.     -Given that patient is hemostatic, no acute intervention   -Recommend minimize nasal trauma as best possible  -If requires oxygen, ideal if it is humidified via face tent and not nasal canula  -Bacitracin to bilateral nares nightly  -Afrin q6hrs PRN bleeding (only if ok from Cards prospective)  -Ocean nasal spray q6hrs while awake (starting in a couple days,  allow stable clot to form for now)  -If bleeding restarts, hold pressure to fleshy portion of nose (10 mins) and lean forward  -For bleeding that does not stop, page resident  -Remainder of medical management per primary team        Atrial flutter    --EP following appreciate recs., will reach out to them- pt currently in Afib that began 6/6- Amio IV load and infusion- transitioned to PO- Currently NSR  --holding BB        S/P CABG x 1    --see MVR         Chronic systolic congestive heart failure    - ASA, statin  - D/c beta blocker  - EP following; appreciate recs      ANNA (acute kidney injury)    --Elevated at 1.6 today, yesterday 1.7.   --holding Stuart Fernandez NP  Cardiothoracic Surgery  [facility]

## 2018-06-09 LAB
ALBUMIN SERPL BCP-MCNC: 2.2 G/DL
ALP SERPL-CCNC: 74 U/L
ALT SERPL W/O P-5'-P-CCNC: 14 U/L
ANION GAP SERPL CALC-SCNC: 15 MMOL/L
APTT BLDCRRT: 28.7 SEC
APTT BLDCRRT: 29.4 SEC
APTT BLDCRRT: 29.4 SEC
APTT BLDCRRT: 30.4 SEC
AST SERPL-CCNC: 12 U/L
BASOPHILS # BLD AUTO: 0.01 K/UL
BASOPHILS NFR BLD: 0.1 %
BILIRUB SERPL-MCNC: 0.6 MG/DL
BLD PROD TYP BPU: NORMAL
BLD PROD TYP BPU: NORMAL
BLOOD UNIT EXPIRATION DATE: NORMAL
BLOOD UNIT EXPIRATION DATE: NORMAL
BLOOD UNIT TYPE CODE: 6200
BLOOD UNIT TYPE CODE: 6200
BLOOD UNIT TYPE: NORMAL
BLOOD UNIT TYPE: NORMAL
BUN SERPL-MCNC: 56 MG/DL
CALCIUM SERPL-MCNC: 8.6 MG/DL
CHLORIDE SERPL-SCNC: 101 MMOL/L
CO2 SERPL-SCNC: 23 MMOL/L
CODING SYSTEM: NORMAL
CODING SYSTEM: NORMAL
CREAT SERPL-MCNC: 2.1 MG/DL
DIFFERENTIAL METHOD: ABNORMAL
DISPENSE STATUS: NORMAL
DISPENSE STATUS: NORMAL
EOSINOPHIL # BLD AUTO: 0.2 K/UL
EOSINOPHIL NFR BLD: 2.5 %
ERYTHROCYTE [DISTWIDTH] IN BLOOD BY AUTOMATED COUNT: 18.2 %
EST. GFR  (AFRICAN AMERICAN): 36.6 ML/MIN/1.73 M^2
EST. GFR  (NON AFRICAN AMERICAN): 31.6 ML/MIN/1.73 M^2
GLUCOSE SERPL-MCNC: 1021 MG/DL
HCT VFR BLD AUTO: 21.5 %
HCT VFR BLD AUTO: 22.9 %
HGB BLD-MCNC: 6.6 G/DL
HGB BLD-MCNC: 6.9 G/DL
IMM GRANULOCYTES # BLD AUTO: 0.04 K/UL
IMM GRANULOCYTES NFR BLD AUTO: 0.5 %
INR PPP: 1.3
LYMPHOCYTES # BLD AUTO: 0.5 K/UL
LYMPHOCYTES NFR BLD: 6 %
MAGNESIUM SERPL-MCNC: 3.7 MG/DL
MCH RBC QN AUTO: 30.1 PG
MCHC RBC AUTO-ENTMCNC: 30.7 G/DL
MCV RBC AUTO: 98 FL
MONOCYTES # BLD AUTO: 0.4 K/UL
MONOCYTES NFR BLD: 4.8 %
NEUTROPHILS # BLD AUTO: 6.6 K/UL
NEUTROPHILS NFR BLD: 86.1 %
NRBC BLD-RTO: 0 /100 WBC
PHOSPHATE SERPL-MCNC: 6.9 MG/DL
PLATELET # BLD AUTO: 171 K/UL
PMV BLD AUTO: 12.6 FL
POCT GLUCOSE: 164 MG/DL (ref 70–110)
POCT GLUCOSE: 188 MG/DL (ref 70–110)
POCT GLUCOSE: 205 MG/DL (ref 70–110)
POCT GLUCOSE: 218 MG/DL (ref 70–110)
POCT GLUCOSE: 258 MG/DL (ref 70–110)
POTASSIUM SERPL-SCNC: 4.8 MMOL/L
POTASSIUM SERPL-SCNC: 5.3 MMOL/L
PROT SERPL-MCNC: 5.3 G/DL
PROTHROMBIN TIME: 13.2 SEC
RBC # BLD AUTO: 2.19 M/UL
SODIUM SERPL-SCNC: 139 MMOL/L
TRANS ERYTHROCYTES VOL PATIENT: NORMAL ML
TRANS ERYTHROCYTES VOL PATIENT: NORMAL ML
WBC # BLD AUTO: 7.68 K/UL

## 2018-06-09 PROCEDURE — 25000003 PHARM REV CODE 250: Performed by: STUDENT IN AN ORGANIZED HEALTH CARE EDUCATION/TRAINING PROGRAM

## 2018-06-09 PROCEDURE — 25000003 PHARM REV CODE 250: Performed by: THORACIC SURGERY (CARDIOTHORACIC VASCULAR SURGERY)

## 2018-06-09 PROCEDURE — P9021 RED BLOOD CELLS UNIT: HCPCS

## 2018-06-09 PROCEDURE — 85610 PROTHROMBIN TIME: CPT

## 2018-06-09 PROCEDURE — 20600001 HC STEP DOWN PRIVATE ROOM

## 2018-06-09 PROCEDURE — 25000003 PHARM REV CODE 250: Performed by: PHYSICIAN ASSISTANT

## 2018-06-09 PROCEDURE — 80053 COMPREHEN METABOLIC PANEL: CPT

## 2018-06-09 PROCEDURE — 63600175 PHARM REV CODE 636 W HCPCS: Performed by: PHYSICIAN ASSISTANT

## 2018-06-09 PROCEDURE — 63600175 PHARM REV CODE 636 W HCPCS: Performed by: STUDENT IN AN ORGANIZED HEALTH CARE EDUCATION/TRAINING PROGRAM

## 2018-06-09 PROCEDURE — 63600175 PHARM REV CODE 636 W HCPCS: Performed by: NURSE PRACTITIONER

## 2018-06-09 PROCEDURE — 85730 THROMBOPLASTIN TIME PARTIAL: CPT | Mod: 91

## 2018-06-09 PROCEDURE — 83735 ASSAY OF MAGNESIUM: CPT

## 2018-06-09 PROCEDURE — 84100 ASSAY OF PHOSPHORUS: CPT

## 2018-06-09 PROCEDURE — A4216 STERILE WATER/SALINE, 10 ML: HCPCS | Performed by: THORACIC SURGERY (CARDIOTHORACIC VASCULAR SURGERY)

## 2018-06-09 PROCEDURE — 99232 SBSQ HOSP IP/OBS MODERATE 35: CPT | Mod: ,,, | Performed by: NURSE PRACTITIONER

## 2018-06-09 PROCEDURE — 85025 COMPLETE CBC W/AUTO DIFF WBC: CPT

## 2018-06-09 PROCEDURE — 25000003 PHARM REV CODE 250: Performed by: NURSE PRACTITIONER

## 2018-06-09 PROCEDURE — 84132 ASSAY OF SERUM POTASSIUM: CPT

## 2018-06-09 PROCEDURE — A4217 STERILE WATER/SALINE, 500 ML: HCPCS | Performed by: STUDENT IN AN ORGANIZED HEALTH CARE EDUCATION/TRAINING PROGRAM

## 2018-06-09 PROCEDURE — 97535 SELF CARE MNGMENT TRAINING: CPT

## 2018-06-09 RX ORDER — INSULIN ASPART 100 [IU]/ML
7 INJECTION, SOLUTION INTRAVENOUS; SUBCUTANEOUS
Status: DISCONTINUED | OUTPATIENT
Start: 2018-06-09 | End: 2018-06-10

## 2018-06-09 RX ORDER — INSULIN ASPART 100 [IU]/ML
7 INJECTION, SOLUTION INTRAVENOUS; SUBCUTANEOUS
Status: DISCONTINUED | OUTPATIENT
Start: 2018-06-10 | End: 2018-06-10

## 2018-06-09 RX ORDER — GLUCAGON 1 MG
1 KIT INJECTION
Status: DISCONTINUED | OUTPATIENT
Start: 2018-06-09 | End: 2018-06-20 | Stop reason: HOSPADM

## 2018-06-09 RX ORDER — WARFARIN 3 MG/1
3 TABLET ORAL DAILY
Status: DISCONTINUED | OUTPATIENT
Start: 2018-06-09 | End: 2018-06-11

## 2018-06-09 RX ORDER — FUROSEMIDE 10 MG/ML
20 INJECTION INTRAMUSCULAR; INTRAVENOUS ONCE
Status: COMPLETED | OUTPATIENT
Start: 2018-06-09 | End: 2018-06-09

## 2018-06-09 RX ORDER — HYDROCODONE BITARTRATE AND ACETAMINOPHEN 500; 5 MG/1; MG/1
TABLET ORAL
Status: DISCONTINUED | OUTPATIENT
Start: 2018-06-09 | End: 2018-06-13

## 2018-06-09 RX ADMIN — HYDRALAZINE HYDROCHLORIDE 25 MG: 25 TABLET, FILM COATED ORAL at 03:06

## 2018-06-09 RX ADMIN — ASPIRIN 81 MG CHEWABLE TABLET 324 MG: 81 TABLET CHEWABLE at 08:06

## 2018-06-09 RX ADMIN — AMPICILLIN 2 G: 2 INJECTION, POWDER, FOR SOLUTION INTRAVENOUS at 10:06

## 2018-06-09 RX ADMIN — Medication 10 ML: at 12:06

## 2018-06-09 RX ADMIN — Medication 3 ML: at 10:06

## 2018-06-09 RX ADMIN — AMPICILLIN 2 G: 2 INJECTION, POWDER, FOR SOLUTION INTRAVENOUS at 05:06

## 2018-06-09 RX ADMIN — INSULIN ASPART 7 UNITS: 100 INJECTION, SOLUTION INTRAVENOUS; SUBCUTANEOUS at 11:06

## 2018-06-09 RX ADMIN — Medication 3 ML: at 02:06

## 2018-06-09 RX ADMIN — Medication 10 ML: at 06:06

## 2018-06-09 RX ADMIN — OXYCODONE HYDROCHLORIDE 5 MG: 5 TABLET ORAL at 08:06

## 2018-06-09 RX ADMIN — WARFARIN SODIUM 3 MG: 3 TABLET ORAL at 05:06

## 2018-06-09 RX ADMIN — CEFTRIAXONE SODIUM 2 G: 2 INJECTION, POWDER, FOR SOLUTION INTRAMUSCULAR; INTRAVENOUS at 11:06

## 2018-06-09 RX ADMIN — FAMOTIDINE 20 MG: 20 TABLET ORAL at 08:06

## 2018-06-09 RX ADMIN — AMIODARONE HYDROCHLORIDE 400 MG: 200 TABLET ORAL at 09:06

## 2018-06-09 RX ADMIN — INSULIN ASPART 2 UNITS: 100 INJECTION, SOLUTION INTRAVENOUS; SUBCUTANEOUS at 12:06

## 2018-06-09 RX ADMIN — AMIODARONE HYDROCHLORIDE 400 MG: 200 TABLET ORAL at 08:06

## 2018-06-09 RX ADMIN — HYDRALAZINE HYDROCHLORIDE 25 MG: 25 TABLET, FILM COATED ORAL at 05:06

## 2018-06-09 RX ADMIN — INSULIN ASPART 3 UNITS: 100 INJECTION, SOLUTION INTRAVENOUS; SUBCUTANEOUS at 05:06

## 2018-06-09 RX ADMIN — CALCIUM GLUCONATE: 94 INJECTION, SOLUTION INTRAVENOUS at 09:06

## 2018-06-09 RX ADMIN — AMLODIPINE BESYLATE 10 MG: 10 TABLET ORAL at 08:06

## 2018-06-09 RX ADMIN — DOXAZOSIN MESYLATE 4 MG: 2 TABLET ORAL at 08:06

## 2018-06-09 RX ADMIN — INSULIN ASPART 7 UNITS: 100 INJECTION, SOLUTION INTRAVENOUS; SUBCUTANEOUS at 09:06

## 2018-06-09 RX ADMIN — POLYETHYLENE GLYCOL 3350 17 G: 17 POWDER, FOR SOLUTION ORAL at 08:06

## 2018-06-09 RX ADMIN — AMPICILLIN 2 G: 2 INJECTION, POWDER, FOR SOLUTION INTRAVENOUS at 04:06

## 2018-06-09 RX ADMIN — INSULIN ASPART 7 UNITS: 100 INJECTION, SOLUTION INTRAVENOUS; SUBCUTANEOUS at 03:06

## 2018-06-09 RX ADMIN — MIRTAZAPINE 15 MG: 15 TABLET, FILM COATED ORAL at 09:06

## 2018-06-09 RX ADMIN — HEPARIN SODIUM AND DEXTROSE 500 UNITS/HR: 10000; 5 INJECTION INTRAVENOUS at 01:06

## 2018-06-09 RX ADMIN — RAMELTEON 8 MG: 8 TABLET, FILM COATED ORAL at 09:06

## 2018-06-09 RX ADMIN — FUROSEMIDE 20 MG: 10 INJECTION, SOLUTION INTRAVENOUS at 04:06

## 2018-06-09 RX ADMIN — OXYCODONE HYDROCHLORIDE 5 MG: 5 TABLET ORAL at 09:06

## 2018-06-09 RX ADMIN — ATORVASTATIN CALCIUM 40 MG: 20 TABLET, FILM COATED ORAL at 08:06

## 2018-06-09 RX ADMIN — HYDRALAZINE HYDROCHLORIDE 25 MG: 25 TABLET, FILM COATED ORAL at 09:06

## 2018-06-09 RX ADMIN — DEXTROSE: 5 SOLUTION INTRAVENOUS at 01:06

## 2018-06-09 NOTE — PLAN OF CARE
POC reviewed with pt and spouse. VS stable, except temp. Pt hypothermic, initial rectal temp 94. 1unit PRBC given, bear hugger applied, temp increasing slowly, see flowsheets. TPN, heparin, and D5W gtt maintained. IV antibiotics given as ordered. TF maintained @ 10ml/hr. G tube connected to lackey drainage bag 1.5hr after meds given, draining properly. Sternal precautions and IS encouraged. Bed low and locked, call light within reach, will continue to monitor. Carin Osman RN

## 2018-06-09 NOTE — PLAN OF CARE
Problem: Patient Care Overview  Goal: Individualization & Mutuality  Dx: CHF  Hx: mitral valve endocarditis and resultant severe MR, TR and pulmonary HTN, EF 36%, diverticulosus, BPH    5/23: admit pre-op  5/24: IABP  5/25: OR for MV replacement, CABGx1  5/26: IABP d/c, 500mL albumin, lasix gtt started. Extubated.  5/27: Cardene restarted, central line d/c  6/4: Barium swallow study - failed - started on TPN; PICC placed      Nursing:  MAP 60-90          Pt remains free from any falls, injuries or traumas. Currently on iv antibiotics for treatment of endocarditis. Family is present at the bedside. No pressure injury noted. On today, he was evaluated by the speech therapist. Will resumed NPO diet status. Also, transferred from bed to chair by OT.

## 2018-06-09 NOTE — PROGRESS NOTES
"[facility]  Endocrinology  Progress Note    Admit Date: 5/23/2018     Reason for Consult: Management of  Hyperglycemia     Surgical Procedure and Date: s/p CABG x 1, MVR  5/25/18; GJ tube placement in IR on 6/7/18     HPI: Patient is a 67 y.o. male with a diagnosis of mitral valve endocarditis and resultant severe MR, TR and pulmonary HTN. The etiology of his endocarditis remains unknown. He was admitted to the SICU pre-operatively for IABP placement 5/23/18. Underwent cardiac surgery 5/25/18.   No personal or family hx of DM  Lab Results   Component Value Date    HGBA1C 5.4 02/22/2018     Endocrine consulted for BG mgmt.             Interval HPI:   Overnight events: remains in CTSU. Hypothermic overnight; placed on bear hugger. BG globally elevated; TPN increased overnight and TF decreased. D5 at 50 cc/hr. Anemic; will receive PRBCs per RN.   Eating:   NPO  Nausea: No  Hypoglycemia and intervention: No  Fever: No  TPN and/or TF: Yes  If yes, type of TF/TPN and rate: TPN rate increased to 50 cc/hr, trickle TF isosource 1.5 at 10 cc/hr    BP (!) 124/56 (BP Location: Right arm, Patient Position: Lying)   Pulse 66   Temp 96.3 °F (35.7 °C) (Oral)   Resp (!) 21   Ht 5' 10" (1.778 m)   Wt 75.3 kg (165 lb 14.4 oz)   SpO2 95%   BMI 23.80 kg/m²       Labs Reviewed and Include      Recent Labs  Lab 06/08/18  1503   K 4.3     Lab Results   Component Value Date    WBC 7.68 06/09/2018    HGB 6.6 (L) 06/09/2018    HCT 21.5 (L) 06/09/2018    MCV 98 06/09/2018     06/09/2018     No results for input(s): TSH, FREET4 in the last 168 hours.  Lab Results   Component Value Date    HGBA1C 5.4 02/22/2018       Nutritional status:   Body mass index is 23.8 kg/m².  Lab Results   Component Value Date    ALBUMIN 2.2 (L) 06/08/2018    ALBUMIN 2.3 (L) 06/07/2018    ALBUMIN 2.3 (L) 06/06/2018     Lab Results   Component Value Date    PREALBUMIN 20 06/06/2018    PREALBUMIN 12 (L) 02/22/2018       Estimated " Creatinine Clearance: 46.3 mL/min (A) (based on SCr of 1.6 mg/dL (H)).    Accu-Checks  Recent Labs      06/07/18   1240  06/07/18   1845  06/08/18   0019  06/08/18   0021  06/08/18   0532  06/08/18   0830  06/08/18   1314  06/08/18   1651  06/09/18   0038  06/09/18   0519   POCTGLUCOSE  156*  222*  450*  180*  259*  190*  226*  215*  218*  258*       Hyperglycemia/Diabetes Medications: Antihyperglycemics     Start     Stop Route Frequency Ordered    05/30/18 1703  insulin aspart U-100 pen 0-5 Units      -- SubQ Every 4 hours PRN 05/30/18 1705          ASSESSMENT and PLAN    * S/P MVR (mitral valve replacement)    Managed by CTS  avoid hypoglycemia          Acute hyperglycemia    BG goal 140-180 mg/dl    Increase Novolog 7 units Q4hr with TPN  Continue bg monitoring q4 hours and low dose correction scale; times changed for correction scale coverage. Please administer in addition to schedule novolog if BG elevated with glucose checkings.   Hold scheduled novolog if TPN suspended/discontinued.       Please page endocrine NP on call with any BG related issues or concerns and with any change in nutrition plans.       Discharge plans-TBD; will depend on nutrition plans.              S/P CABG x 1    Managed by  CTS  avoid hypoglycemia          Chronic systolic congestive heart failure    Per CTS  avoid hypoglycemia            Sequelae of hyperalimentation    Likely causing increase insulin needs.   Rate decreased this AM to 25 cc/hr. Will stop after 11 hours.    TPN rate increased 6/8/18 back to 50 cc/hr  Enteral nutrition being started- trickle feeds on 6/8/18          On enteral nutrition    Trickle feeds started 6/8/18.           ANNA (acute kidney injury)    Estimated Creatinine Clearance: 46.3 mL/min (A) (based on SCr of 1.6 mg/dL (H)).  avoid hypoglycemia  Caution with insulin stacking  Lab Results   Component Value Date    CREATININE 1.6 (H) 06/08/2018                   Lynn Canseco NP  Endocrinology  [facility]

## 2018-06-09 NOTE — PLAN OF CARE
Problem: SLP Goal  Goal: SLP Goal  Speech Language Pathology Goals  Goals expected to be met by 6/11  1. Pt will tolerate puree & honey thickened liquids for pleasure while completing 2-3 swallows per bolus w/o overt S/S aspiration, MIN A   3. Educate Pt and family on safe swallow strategies and S/S aspiration   4. Continue to assess swallow to determine feasibility of PO upgrade   5. Pt will complete dysphagia exercises given min A      Speech Language Pathology Goals  Goals expected to be met by 6/7:  1. Pt will tolerate trials of puree w/o overt S/S aspiration, MIN A - CONTINUE  2. Pt will tolerate trials of nectar-thickened liquids w/o overt S/S aspiration, MIN A - DISCONTINUE  3. Educate Pt and family on safe swallow strategies and S/S aspiration - CONTINUE  4. Continue to assess swallow to determine feasibility of PO upgrade - CONTINUE      Goals expected to be met by 6/4: goals not met 2/2 transfer to ICU 5/30  1. Pt will participate in ongoing swallowing assessment to determine safest and least restrictive diet.          Outcome: Ongoing (interventions implemented as appropriate)  SLP paged by nurse to review oral care and family questions/concerns. Family asking if they can bring Pt Biotine, SLP confirms Biotine for oral rinse/swish ok if provided MD approval, must be supervised and have 1:1 assistance for all oral care. Patient also reporting difficulty with hearing. Nurse notified. ST to continue to follow.      CAREY Salgado., [facility]-SLP  Speech-Language Pathology  Pager: [phone]  6/9/2018

## 2018-06-09 NOTE — CARE UPDATE
Proactive Rounding Follow-up Note:  Bedside RN, Carin, called with update. Current temp increased to 95.3 VT. Repeat HCT 6.9.  Recommended to call primary since Hct <7, and to continue gal hugger use until temperature normal.   Please call RRT [phone] for any additional concerns or questions.

## 2018-06-09 NOTE — ASSESSMENT & PLAN NOTE
Likely causing increase insulin needs.   Rate decreased this AM to 25 cc/hr. Will stop after 11 hours.    TPN rate increased 6/8/18 back to 50 cc/hr  Enteral nutrition being started- trickle feeds on 6/8/18

## 2018-06-09 NOTE — PROGRESS NOTES
Still unable to obtain temp orally or axillary, MD on call notified. OK to obtain rectal temp and apply bear hugger.

## 2018-06-09 NOTE — ASSESSMENT & PLAN NOTE
--Continue ASA, statin (holding BB)  --Sternal precautions  --PT/OT  --Ambulate x4   --Wean O2 as tolerated for O2 sat >92%  --monitor CXR    --Monitor electrolytes and replace prn  --hold Lasix  --D5W @50  -- Q6  --continue  lackey   --strict I&Os  --did not pass modified swallow; PICC and TPN  --G-J tube placed; increase TFs  --2u PRBC w/20 lasix        DISPO: discharge planning on going for likely rehab

## 2018-06-09 NOTE — SUBJECTIVE & OBJECTIVE
Interval History: Hypothermic overnight. Warmed with barehugger. Doing well this am.    Medications:  Continuous Infusions:   dextrose 5 % 50 mL/hr at 06/09/18 1347    heparin (porcine) in 5 % dex 500 Units/hr (06/09/18 1347)    TPN ADULT CENTRAL LINE CUSTOM 50 mL/hr at 06/08/18 2242    TPN ADULT CENTRAL LINE CUSTOM       Scheduled Meds:   amiodarone  400 mg Per G Tube BID    amLODIPine  10 mg Per G Tube Daily    ampicillin IVPB  2 g Intravenous Q6H    aspirin  324 mg Per G Tube Daily    atorvastatin  40 mg Per G Tube Daily    cefTRIAXone (ROCEPHIN) IVPB  2 g Intravenous Q12H    doxazosin  4 mg Per G Tube Daily    famotidine  20 mg Per G Tube Daily    hydrALAZINE  25 mg Per G Tube Q8H    [START ON 6/10/2018] insulin aspart U-100  7 Units Subcutaneous Q24H    [START ON 6/10/2018] insulin aspart U-100  7 Units Subcutaneous Q24H    insulin aspart U-100  7 Units Subcutaneous Q24H    insulin aspart U-100  7 Units Subcutaneous Q24H    insulin aspart U-100  7 Units Subcutaneous Q24H    insulin aspart U-100  7 Units Subcutaneous Q24H    mirtazapine  15 mg Per G Tube QHS    polyethylene glycol  17 g Per G Tube Daily    ramelteon  8 mg Per G Tube QHS    sodium chloride 0.9%  10 mL Intravenous Q6H    sodium chloride 0.9%  3 mL Intravenous Q8H    warfarin  3 mg Per G Tube Daily     PRN Meds:sodium chloride, albumin human 5%, albuterol-ipratropium, bisacodyl, dextrose 50%, docusate, glucagon (human recombinant), hydrALAZINE, insulin aspart U-100, lactated ringers, metoclopramide HCl, morphine, ondansetron, oxyCODONE, Flushing PICC Protocol **AND** sodium chloride 0.9% **AND** sodium chloride 0.9%, white petrolatum-mineral oil     Objective:     Vital Signs (Most Recent):  Temp: 96.1 °F (35.6 °C) (06/09/18 1749)  Pulse: 68 (06/09/18 1749)  Resp: 18 (06/09/18 1749)  BP: (!) 122/59 (06/09/18 1749)  SpO2: (!) 94 % (06/09/18 7649) Vital Signs (24h Range):  Temp:  [94 °F (34.4 °C)-97.7 °F (36.5 °C)] 96.1 °F  (35.6 °C)  Pulse:  [61-75] 68  Resp:  [16-22] 18  SpO2:  [91 %-95 %] 94 %  BP: (109-144)/(55-61) 122/59     Weight: 75.3 kg (165 lb 14.4 oz)  Body mass index is 23.8 kg/m².    SpO2: (!) 94 %  O2 Device (Oxygen Therapy): nasal cannula    Intake/Output - Last 3 Shifts       06/07 0700 - 06/08 0659 06/08 0700 - 06/09 0659 06/09 0700 - 06/10 0659    P.O. 0 0 0    I.V. (mL/kg) 645.4 (8.5) 948.5 (12.6) 440 (5.8)    Blood  377     NG/GT   248    IV Piggyback 500 550 250    .3 615 400    Total Intake(mL/kg) 1898.7 (25.1) 2490.5 (33.1) 1338 (17.8)    Urine (mL/kg/hr) 2375 (1.3) 1000 (0.6) 500 (0.6)    Drains   210 (0.2)    Total Output 2375 1000 710    Net -476.3 +1490.5 +628           Stool Occurrence  0 x 1 x          Lines/Drains/Airways     Peripherally Inserted Central Catheter Line                 PICC Double Lumen 03/05/18 1539 right basilic 96 days         PICC Double Lumen 06/04/18 1654 left brachial 5 days          Central Venous Catheter Line                 Percutaneous Central Line Insertion/Assessment - Quad lumen  05/25/18 0800 left subclavian 15 days          Drain                 Urethral Catheter 06/06/18 0030 3 days         Gastrostomy/Enterostomy 06/07/18 1657 Gastrostomy-jejunostomy LUQ 2 days          Line                 Pacer Wires 05/25/18 1355 15 days          Peripheral Intravenous Line                 Peripheral IV - Single Lumen 06/07/18 2236 Right Antecubital 1 day                Physical Exam   Constitutional: He is oriented to person, place, and time. He appears cachectic.   HENT:   Head: Normocephalic and atraumatic.   Eyes: Pupils are equal, round, and reactive to light.   Neck: Normal range of motion. Neck supple.   Cardiovascular: Normal rate, regular rhythm and normal heart sounds.    Pulmonary/Chest: Effort normal. He has rales.   Abdominal: Soft. Bowel sounds are normal.   Musculoskeletal: Normal range of motion.   Neurological: He is alert and oriented to person, place, and  time.   Skin: Skin is warm and dry.   Psychiatric: He has a normal mood and affect. His behavior is normal.       Significant Labs:  CBC:   Recent Labs  Lab 06/09/18  0535   WBC 7.68   RBC 2.19*   HGB 6.6*   HCT 21.5*      MCV 98   MCH 30.1   MCHC 30.7*     CMP:   Recent Labs  Lab 06/09/18  0645 06/09/18  1508   GLU 1,021*  --    CALCIUM 8.6*  --    ALBUMIN 2.2*  --    PROT 5.3*  --      --    K 4.8 5.3*   CO2 23  --      --    BUN 56*  --    CREATININE 2.1*  --    ALKPHOS 74  --    ALT 14  --    AST 12  --    BILITOT 0.6  --      Coagulation:   Recent Labs  Lab 06/09/18  0535 06/09/18  1313   INR 1.3*  --    APTT 28.7 29.4

## 2018-06-09 NOTE — CARE UPDATE
"  RN Proactive Rounding Note  Time of Visit:     Admit Date: 2018  LOS: 17  Code Status: Full Code   Date of Visit: 2018  : 1950  Age: 67 y.o.  Sex: male  [facility]   MRN: [id number]  Was the patient discharged from an ICU this admission? Yes  Was the patient discharged from a PACU within last 24 hours? No  Did the patient receive conscious sedation/general anesthesia in last 24 hours? No  Was the patient in the ED within the past 24 hours? No  Was the patient started on NIPPV within the past 24 hours? No  Attending Physician: Marvin Go MD  Primary Service: Networked reference to record PCT       ASSESSMENT:     Abnormal Vital Signs: QUIN temperature, anemia  Clinical Issues: Circulatory     INTERVENTIONS/ RECOMMENDATIONS:     Called to bedside by RN concern for inability to obtain PO or axillary temp (AK probe, and gal hugger system already ordered and awaiting delivery).  Upon arrival to room, patient lying in bed, denies discomfort at this time, states "I feel fine right now." PRBC infusion recently finished. AK temp obtained 94 degrees. Gal hugger applied. Order for repeat H&H received.   Patient on 2L humidified NC, HR 61 with PACs, D5 @ 50 ml/hr, TPN, Heparin gtts, trickle tube feeds.   Recommend to recheck temp in 2 hours to ensure patient is being warmed, and to call primary team if H&H recheck shows a Hct <7.    Discussed plan of care with Carin JEAN, and charge RNOziel    PHYSICIAN ESCALATION:     Yes/No Yes    Orders received and case discussed with Dr Perrin     Disposition: remain on floor    FOLLOW-UP/CONTINGENCY:       Call back the Rapid Response Nurse at x [phone]  for additional questions or concerns        "

## 2018-06-09 NOTE — NURSING
Pt coughed up a medium sized blood clot. Vss. Informed Dr. Samaniego. No further orders given. Will report to pm nurse.

## 2018-06-09 NOTE — SUBJECTIVE & OBJECTIVE
"Interval HPI:   Overnight events: remains in CTSU. Hypothermic overnight; placed on bear hugger. BG globally elevated; TPN increased overnight and TF decreased. D5 at 50 cc/hr. Anemic; will receive PRBCs per RN.   Eating:   NPO  Nausea: No  Hypoglycemia and intervention: No  Fever: No  TPN and/or TF: Yes  If yes, type of TF/TPN and rate: TPN rate increased to 50 cc/hr, trickle TF isosource 1.5 at 10 cc/hr    BP (!) 124/56 (BP Location: Right arm, Patient Position: Lying)   Pulse 66   Temp 96.3 °F (35.7 °C) (Oral)   Resp (!) 21   Ht 5' 10" (1.778 m)   Wt 75.3 kg (165 lb 14.4 oz)   SpO2 95%   BMI 23.80 kg/m²     Labs Reviewed and Include      Recent Labs  Lab 06/08/18  1503   K 4.3     Lab Results   Component Value Date    WBC 7.68 06/09/2018    HGB 6.6 (L) 06/09/2018    HCT 21.5 (L) 06/09/2018    MCV 98 06/09/2018     06/09/2018     No results for input(s): TSH, FREET4 in the last 168 hours.  Lab Results   Component Value Date    HGBA1C 5.4 02/22/2018       Nutritional status:   Body mass index is 23.8 kg/m².  Lab Results   Component Value Date    ALBUMIN 2.2 (L) 06/08/2018    ALBUMIN 2.3 (L) 06/07/2018    ALBUMIN 2.3 (L) 06/06/2018     Lab Results   Component Value Date    PREALBUMIN 20 06/06/2018    PREALBUMIN 12 (L) 02/22/2018       Estimated Creatinine Clearance: 46.3 mL/min (A) (based on SCr of 1.6 mg/dL (H)).    Accu-Checks  Recent Labs      06/07/18   1240  06/07/18   1845  06/08/18   0019  06/08/18   0021  06/08/18   0532  06/08/18   0830  06/08/18   1314  06/08/18   1651  06/09/18   0038  06/09/18   0519   POCTGLUCOSE  156*  222*  450*  180*  259*  190*  226*  215*  218*  258*       Hyperglycemia/Diabetes Medications: Antihyperglycemics     Start     Stop Route Frequency Ordered    05/30/18 1703  insulin aspart U-100 pen 0-5 Units      -- SubQ Every 4 hours PRN 05/30/18 1705        "

## 2018-06-09 NOTE — ASSESSMENT & PLAN NOTE
BG goal 140-180 mg/dl    Increase Novolog 7 units Q4hr with TPN  Continue bg monitoring q4 hours and low dose correction scale; times changed for correction scale coverage. Please administer in addition to schedule novolog if BG elevated with glucose checkings.   Hold scheduled novolog if TPN suspended/discontinued.       Please page endocrine NP on call with any BG related issues or concerns and with any change in nutrition plans.       Discharge plans-TBD; will depend on nutrition plans.

## 2018-06-09 NOTE — PT/OT/SLP PROGRESS
"Speech Language Pathology Treatment    Patient Name:  Rodolfo Messina   MRN:  [id number]  Admitting Diagnosis: S/P MVR (mitral valve replacement)    Recommendations:                 General Recommendations:  Dysphagia therapy, ENT consult   Diet recommendations:  NPO, Liquid Diet Level: NPO   Aspiration Precautions: Continue alternate means of nutrition   General Precautions: Standard, aspiration, sternal  Communication strategies:  none    Subjective     SLP paged by nurse 2/2 questions family has about oral care  Pt presents calm  Spouse asks, "Can we use it like a swish and spit similar to the mouthwash?"  Pt asks, "When will we do the swallow test again?"  Pt reports moderate pain around PEG site, 5/10, nurse aware    Objective:     Has the patient been evaluated by SLP for swallowing?   Yes  Keep patient NPO? Yes   Current Respiratory Status: nasal cannula      Pt seen for family training 2/2 concerns about oral care. Pt found awake in bed with family at bedside. Nurse in room at start of session then leaves. Pt's spouse inquiring about option to use biotine v/ mouthwash/water. SLP educates Pt and family on oral care, SLP POC, ongoing HEP for dysphagia exercises and possible further assessment via ENT 2/2 difficulty with hearing. Pt and family verbalize understanding of SLP education. No further questions. Whiteboard current.     Assessment:     Rodolfo Messina is a 67 y.o. male with an SLP diagnosis of Dysphagia.  He presents with c/o hearing loss this service day and might benefit from further ENT assessment.    Goals:    SLP Goals        Problem: SLP Goal    Goal Priority Disciplines Outcome   SLP Goal     SLP Ongoing (interventions implemented as appropriate)   Description:  Speech Language Pathology Goals  Goals expected to be met by 6/11  1. Pt will tolerate puree & honey thickened liquids for pleasure while completing 2-3 swallows per bolus w/o overt S/S aspiration, MIN A   3. Educate Pt and family on " safe swallow strategies and S/S aspiration   4. Continue to assess swallow to determine feasibility of PO upgrade   5. Pt will complete dysphagia exercises given min A      Speech Language Pathology Goals  Goals expected to be met by 6/7:  1. Pt will tolerate trials of puree w/o overt S/S aspiration, MIN A - CONTINUE  2. Pt will tolerate trials of nectar-thickened liquids w/o overt S/S aspiration, MIN A - DISCONTINUE  3. Educate Pt and family on safe swallow strategies and S/S aspiration - CONTINUE  4. Continue to assess swallow to determine feasibility of PO upgrade - CONTINUE      Goals expected to be met by 6/4: goals not met 2/2 transfer to ICU 5/30  1. Pt will participate in ongoing swallowing assessment to determine safest and least restrictive diet.                           Plan:     · Patient to be seen:  5 x/week   · Plan of Care expires:  06/30/18  · Plan of Care reviewed with:  patient   · SLP Follow-Up:  Yes       Discharge recommendations:  rehabilitation facility   Barriers to Discharge:  Level of Skilled Assistance Needed     Time Tracking:     SLP Treatment Date:   06/09/18  Speech Start Time:  1615  Speech Stop Time:  1623     Speech Total Time (min):  8 min    Billable Minutes: Seld Care/Home Management Training 8    CAREY Salgado., [facility]-SLP  Speech-Language Pathology  Pager: [phone]      06/09/2018

## 2018-06-09 NOTE — PROGRESS NOTES
[facility]  Cardiothoracic Surgery  Progress Note    Patient Name: Rodolfo Messina  MRN: [id number]  Admission Date: 5/23/2018  Hospital Length of Stay: 17 days  Code Status: Full Code   Attending Physician: Marvin Go MD   Referring Provider: Marvin Go MD  Principal Problem:S/P MVR (mitral valve replacement)    Subjective:     Post-Op Info:  Procedure(s) (LRB):  Mitral Valve Replacement (N/A)  AORTOCORONARY BYPASS-CABG (N/A)   15 Days Post-Op     Interval History: Hypothermic overnight. Warmed with barehugger. Doing well this am.    Medications:  Continuous Infusions:   dextrose 5 % 50 mL/hr at 06/09/18 1347    heparin (porcine) in 5 % dex 500 Units/hr (06/09/18 1347)    TPN ADULT CENTRAL LINE CUSTOM 50 mL/hr at 06/08/18 2242    TPN ADULT CENTRAL LINE CUSTOM       Scheduled Meds:   amiodarone  400 mg Per G Tube BID    amLODIPine  10 mg Per G Tube Daily    ampicillin IVPB  2 g Intravenous Q6H    aspirin  324 mg Per G Tube Daily    atorvastatin  40 mg Per G Tube Daily    cefTRIAXone (ROCEPHIN) IVPB  2 g Intravenous Q12H    doxazosin  4 mg Per G Tube Daily    famotidine  20 mg Per G Tube Daily    hydrALAZINE  25 mg Per G Tube Q8H    [START ON 6/10/2018] insulin aspart U-100  7 Units Subcutaneous Q24H    [START ON 6/10/2018] insulin aspart U-100  7 Units Subcutaneous Q24H    insulin aspart U-100  7 Units Subcutaneous Q24H    insulin aspart U-100  7 Units Subcutaneous Q24H    insulin aspart U-100  7 Units Subcutaneous Q24H    insulin aspart U-100  7 Units Subcutaneous Q24H    mirtazapine  15 mg Per G Tube QHS    polyethylene glycol  17 g Per G Tube Daily    ramelteon  8 mg Per G Tube QHS    sodium chloride 0.9%  10 mL Intravenous Q6H    sodium chloride 0.9%  3 mL Intravenous Q8H    warfarin  3 mg Per G Tube Daily     PRN Meds:sodium chloride, albumin human 5%, albuterol-ipratropium, bisacodyl, dextrose 50%, docusate, glucagon (human recombinant), hydrALAZINE, insulin  aspart U-100, lactated ringers, metoclopramide HCl, morphine, ondansetron, oxyCODONE, Flushing PICC Protocol **AND** sodium chloride 0.9% **AND** sodium chloride 0.9%, white petrolatum-mineral oil     Objective:     Vital Signs (Most Recent):  Temp: 96.1 °F (35.6 °C) (06/09/18 1749)  Pulse: 68 (06/09/18 1749)  Resp: 18 (06/09/18 1749)  BP: (!) 122/59 (06/09/18 1749)  SpO2: (!) 94 % (06/09/18 1749) Vital Signs (24h Range):  Temp:  [94 °F (34.4 °C)-97.7 °F (36.5 °C)] 96.1 °F (35.6 °C)  Pulse:  [61-75] 68  Resp:  [16-22] 18  SpO2:  [91 %-95 %] 94 %  BP: (109-144)/(55-61) 122/59     Weight: 75.3 kg (165 lb 14.4 oz)  Body mass index is 23.8 kg/m².    SpO2: (!) 94 %  O2 Device (Oxygen Therapy): nasal cannula    Intake/Output - Last 3 Shifts       06/07 0700 - 06/08 0659 06/08 0700 - 06/09 0659 06/09 0700 - 06/10 0659    P.O. 0 0 0    I.V. (mL/kg) 645.4 (8.5) 948.5 (12.6) 440 (5.8)    Blood  377     NG/GT   248    IV Piggyback 500 550 250    .3 615 400    Total Intake(mL/kg) 1898.7 (25.1) 2490.5 (33.1) 1338 (17.8)    Urine (mL/kg/hr) 2375 (1.3) 1000 (0.6) 500 (0.6)    Drains   210 (0.2)    Total Output 2375 1000 710    Net -476.3 +1490.5 +628           Stool Occurrence  0 x 1 x          Lines/Drains/Airways     Peripherally Inserted Central Catheter Line                 PICC Double Lumen 03/05/18 1539 right basilic 96 days         PICC Double Lumen 06/04/18 1654 left brachial 5 days          Central Venous Catheter Line                 Percutaneous Central Line Insertion/Assessment - Quad lumen  05/25/18 0800 left subclavian 15 days          Drain                 Urethral Catheter 06/06/18 0030 3 days         Gastrostomy/Enterostomy 06/07/18 1657 Gastrostomy-jejunostomy LUQ 2 days          Line                 Pacer Wires 05/25/18 1355 15 days          Peripheral Intravenous Line                 Peripheral IV - Single Lumen 06/07/18 2236 Right Antecubital 1 day                Physical Exam   Constitutional: He is  oriented to person, place, and time. He appears cachectic.   HENT:   Head: Normocephalic and atraumatic.   Eyes: Pupils are equal, round, and reactive to light.   Neck: Normal range of motion. Neck supple.   Cardiovascular: Normal rate, regular rhythm and normal heart sounds.    Pulmonary/Chest: Effort normal. He has rales.   Abdominal: Soft. Bowel sounds are normal.   Musculoskeletal: Normal range of motion.   Neurological: He is alert and oriented to person, place, and time.   Skin: Skin is warm and dry.   Psychiatric: He has a normal mood and affect. His behavior is normal.       Significant Labs:  CBC:   Recent Labs  Lab 06/09/18  0535   WBC 7.68   RBC 2.19*   HGB 6.6*   HCT 21.5*      MCV 98   MCH 30.1   MCHC 30.7*     CMP:   Recent Labs  Lab 06/09/18  0645 06/09/18  1508   GLU 1,021*  --    CALCIUM 8.6*  --    ALBUMIN 2.2*  --    PROT 5.3*  --      --    K 4.8 5.3*   CO2 23  --      --    BUN 56*  --    CREATININE 2.1*  --    ALKPHOS 74  --    ALT 14  --    AST 12  --    BILITOT 0.6  --      Coagulation:   Recent Labs  Lab 06/09/18  0535 06/09/18  1313   INR 1.3*  --    APTT 28.7 29.4     Assessment/Plan:     * S/P MVR (mitral valve replacement)    --Continue ASA, statin (holding BB)  --Sternal precautions  --PT/OT  --Ambulate x4   --Wean O2 as tolerated for O2 sat >92%  --monitor CXR    --Monitor electrolytes and replace prn  --hold Lasix  --D5W @50  -- Q6  --continue  lackey   --strict I&Os  --did not pass modified swallow; PICC and TPN  --G-J tube placed; increase TFs  --2u PRBC w/20 lasix        DISPO: discharge planning on going for likely rehab           Dysphagia    --SLP following  --failed last BMS study   --GJ tube placed by IR 6/7  --initiate TF 10ml/hr        Impaired functional mobility and endurance    --discharging to rehab  --PT/OT daily         On enteral nutrition    --continue TPN with lipids until GJ tube placed- transitioning off, dose halfed         Epistaxis     --Noted to have post traumatic left sided epistaxis following attempted NGT placement. On the time of evaluation patient was hemostatic. No active bleeding noted.     -Given that patient is hemostatic, no acute intervention   -Recommend minimize nasal trauma as best possible  -If requires oxygen, ideal if it is humidified via face tent and not nasal canula  -Bacitracin to bilateral nares nightly  -Afrin q6hrs PRN bleeding (only if ok from Cards prospective)  -Ocean nasal spray q6hrs while awake (starting in a couple days, allow stable clot to form for now)  -If bleeding restarts, hold pressure to fleshy portion of nose (10 mins) and lean forward  -For bleeding that does not stop, page resident  -Remainder of medical management per primary team        Atrial flutter    --EP following appreciate recs., will reach out to them- pt currently in Afib that began 6/6- Amio IV load and infusion   --holding BB        S/P CABG x 1    --see MVR         Chronic systolic congestive heart failure    - ASA, statin  - D/c beta blocker  - EP following; appreciate recs          ANNA (acute kidney injury)    --Elevated at 2.1 today, yesterday 1.6  --holding Stuart Hawkins MD  Cardiothoracic Surgery  [facility]

## 2018-06-10 LAB
ALBUMIN SERPL BCP-MCNC: 2.3 G/DL
ALP SERPL-CCNC: 82 U/L
ALT SERPL W/O P-5'-P-CCNC: 14 U/L
ANION GAP SERPL CALC-SCNC: 8 MMOL/L
APTT BLDCRRT: 29.2 SEC
APTT BLDCRRT: 29.4 SEC
APTT BLDCRRT: 31.1 SEC
APTT BLDCRRT: 32 SEC
AST SERPL-CCNC: 15 U/L
BASOPHILS # BLD AUTO: 0.02 K/UL
BASOPHILS NFR BLD: 0.3 %
BILIRUB SERPL-MCNC: 0.6 MG/DL
BUN SERPL-MCNC: 63 MG/DL
CALCIUM SERPL-MCNC: 8.9 MG/DL
CHLORIDE SERPL-SCNC: 105 MMOL/L
CO2 SERPL-SCNC: 30 MMOL/L
CREAT SERPL-MCNC: 1.7 MG/DL
DIFFERENTIAL METHOD: ABNORMAL
EOSINOPHIL # BLD AUTO: 0.2 K/UL
EOSINOPHIL NFR BLD: 3 %
ERYTHROCYTE [DISTWIDTH] IN BLOOD BY AUTOMATED COUNT: 16.8 %
EST. GFR  (AFRICAN AMERICAN): 47.2 ML/MIN/1.73 M^2
EST. GFR  (NON AFRICAN AMERICAN): 40.8 ML/MIN/1.73 M^2
GLUCOSE SERPL-MCNC: 164 MG/DL
HCT VFR BLD AUTO: 24.6 %
HGB BLD-MCNC: 8.2 G/DL
IMM GRANULOCYTES # BLD AUTO: 0.05 K/UL
IMM GRANULOCYTES NFR BLD AUTO: 0.7 %
INR PPP: 1.2
LYMPHOCYTES # BLD AUTO: 0.6 K/UL
LYMPHOCYTES NFR BLD: 7.4 %
MAGNESIUM SERPL-MCNC: 3.2 MG/DL
MCH RBC QN AUTO: 31.2 PG
MCHC RBC AUTO-ENTMCNC: 33.3 G/DL
MCV RBC AUTO: 94 FL
MONOCYTES # BLD AUTO: 0.4 K/UL
MONOCYTES NFR BLD: 5.6 %
NEUTROPHILS # BLD AUTO: 6.3 K/UL
NEUTROPHILS NFR BLD: 83 %
NRBC BLD-RTO: 0 /100 WBC
PHOSPHATE SERPL-MCNC: 4.8 MG/DL
PLATELET # BLD AUTO: 165 K/UL
PMV BLD AUTO: 13 FL
POCT GLUCOSE: 110 MG/DL (ref 70–110)
POCT GLUCOSE: 123 MG/DL (ref 70–110)
POCT GLUCOSE: 137 MG/DL (ref 70–110)
POCT GLUCOSE: 138 MG/DL (ref 70–110)
POCT GLUCOSE: 166 MG/DL (ref 70–110)
POCT GLUCOSE: 169 MG/DL (ref 70–110)
POCT GLUCOSE: 88 MG/DL (ref 70–110)
POTASSIUM SERPL-SCNC: 3.6 MMOL/L
POTASSIUM SERPL-SCNC: 4.1 MMOL/L
PROT SERPL-MCNC: 5.8 G/DL
PROTHROMBIN TIME: 12.6 SEC
RBC # BLD AUTO: 2.63 M/UL
SODIUM SERPL-SCNC: 143 MMOL/L
WBC # BLD AUTO: 7.55 K/UL

## 2018-06-10 PROCEDURE — 84100 ASSAY OF PHOSPHORUS: CPT

## 2018-06-10 PROCEDURE — A4216 STERILE WATER/SALINE, 10 ML: HCPCS | Performed by: THORACIC SURGERY (CARDIOTHORACIC VASCULAR SURGERY)

## 2018-06-10 PROCEDURE — 63600175 PHARM REV CODE 636 W HCPCS: Performed by: PHYSICIAN ASSISTANT

## 2018-06-10 PROCEDURE — 85025 COMPLETE CBC W/AUTO DIFF WBC: CPT

## 2018-06-10 PROCEDURE — 83735 ASSAY OF MAGNESIUM: CPT

## 2018-06-10 PROCEDURE — 85610 PROTHROMBIN TIME: CPT

## 2018-06-10 PROCEDURE — 25000003 PHARM REV CODE 250: Performed by: PHYSICIAN ASSISTANT

## 2018-06-10 PROCEDURE — 25000003 PHARM REV CODE 250: Performed by: THORACIC SURGERY (CARDIOTHORACIC VASCULAR SURGERY)

## 2018-06-10 PROCEDURE — 20600001 HC STEP DOWN PRIVATE ROOM

## 2018-06-10 PROCEDURE — 80053 COMPREHEN METABOLIC PANEL: CPT

## 2018-06-10 PROCEDURE — 85730 THROMBOPLASTIN TIME PARTIAL: CPT

## 2018-06-10 PROCEDURE — 84132 ASSAY OF SERUM POTASSIUM: CPT

## 2018-06-10 PROCEDURE — 63600175 PHARM REV CODE 636 W HCPCS: Performed by: NURSE PRACTITIONER

## 2018-06-10 PROCEDURE — 25000003 PHARM REV CODE 250: Performed by: NURSE PRACTITIONER

## 2018-06-10 PROCEDURE — 85730 THROMBOPLASTIN TIME PARTIAL: CPT | Mod: 91

## 2018-06-10 PROCEDURE — 99232 SBSQ HOSP IP/OBS MODERATE 35: CPT | Mod: ,,, | Performed by: NURSE PRACTITIONER

## 2018-06-10 PROCEDURE — 25000003 PHARM REV CODE 250: Performed by: STUDENT IN AN ORGANIZED HEALTH CARE EDUCATION/TRAINING PROGRAM

## 2018-06-10 RX ADMIN — HYDRALAZINE HYDROCHLORIDE 25 MG: 25 TABLET, FILM COATED ORAL at 09:06

## 2018-06-10 RX ADMIN — INSULIN ASPART 7 UNITS: 100 INJECTION, SOLUTION INTRAVENOUS; SUBCUTANEOUS at 05:06

## 2018-06-10 RX ADMIN — AMIODARONE HYDROCHLORIDE 400 MG: 200 TABLET ORAL at 09:06

## 2018-06-10 RX ADMIN — Medication 3 ML: at 02:06

## 2018-06-10 RX ADMIN — AMLODIPINE BESYLATE 10 MG: 10 TABLET ORAL at 09:06

## 2018-06-10 RX ADMIN — DEXTROSE: 5 SOLUTION INTRAVENOUS at 05:06

## 2018-06-10 RX ADMIN — CEFTRIAXONE SODIUM 2 G: 2 INJECTION, POWDER, FOR SOLUTION INTRAMUSCULAR; INTRAVENOUS at 11:06

## 2018-06-10 RX ADMIN — WARFARIN SODIUM 3 MG: 3 TABLET ORAL at 04:06

## 2018-06-10 RX ADMIN — OXYCODONE HYDROCHLORIDE 5 MG: 5 TABLET ORAL at 04:06

## 2018-06-10 RX ADMIN — RAMELTEON 8 MG: 8 TABLET, FILM COATED ORAL at 09:06

## 2018-06-10 RX ADMIN — AMPICILLIN 2 G: 2 INJECTION, POWDER, FOR SOLUTION INTRAVENOUS at 05:06

## 2018-06-10 RX ADMIN — DOXAZOSIN MESYLATE 4 MG: 2 TABLET ORAL at 09:06

## 2018-06-10 RX ADMIN — Medication 3 ML: at 09:06

## 2018-06-10 RX ADMIN — Medication 10 ML: at 11:06

## 2018-06-10 RX ADMIN — HYDRALAZINE HYDROCHLORIDE 25 MG: 25 TABLET, FILM COATED ORAL at 05:06

## 2018-06-10 RX ADMIN — AMPICILLIN 2 G: 2 INJECTION, POWDER, FOR SOLUTION INTRAVENOUS at 09:06

## 2018-06-10 RX ADMIN — HYDRALAZINE HYDROCHLORIDE 25 MG: 25 TABLET, FILM COATED ORAL at 03:06

## 2018-06-10 RX ADMIN — MIRTAZAPINE 15 MG: 15 TABLET, FILM COATED ORAL at 09:06

## 2018-06-10 RX ADMIN — ASPIRIN 81 MG CHEWABLE TABLET 324 MG: 81 TABLET CHEWABLE at 09:06

## 2018-06-10 RX ADMIN — Medication 10 ML: at 12:06

## 2018-06-10 RX ADMIN — INSULIN ASPART 7 UNITS: 100 INJECTION, SOLUTION INTRAVENOUS; SUBCUTANEOUS at 04:06

## 2018-06-10 RX ADMIN — INSULIN ASPART 7 UNITS: 100 INJECTION, SOLUTION INTRAVENOUS; SUBCUTANEOUS at 11:06

## 2018-06-10 RX ADMIN — AMPICILLIN 2 G: 2 INJECTION, POWDER, FOR SOLUTION INTRAVENOUS at 10:06

## 2018-06-10 RX ADMIN — ATORVASTATIN CALCIUM 40 MG: 20 TABLET, FILM COATED ORAL at 09:06

## 2018-06-10 RX ADMIN — AMPICILLIN 2 G: 2 INJECTION, POWDER, FOR SOLUTION INTRAVENOUS at 04:06

## 2018-06-10 RX ADMIN — CEFTRIAXONE SODIUM 2 G: 2 INJECTION, POWDER, FOR SOLUTION INTRAMUSCULAR; INTRAVENOUS at 12:06

## 2018-06-10 NOTE — ASSESSMENT & PLAN NOTE
Trickle feeds started 6/8/18- advanced overnight on 6/9-6/10 to goal of 50 cc/hr  At goal 6/10/18 - will assess insulin needs on TF alone.

## 2018-06-10 NOTE — PROGRESS NOTES
"[facility]  Endocrinology  Progress Note    Admit Date: 5/23/2018     Reason for Consult: Management of  Hyperglycemia     Surgical Procedure and Date: s/p CABG x 1, MVR  5/25/18; GJ tube placement in IR on 6/7/18     HPI: Patient is a 67 y.o. male with a diagnosis of mitral valve endocarditis and resultant severe MR, TR and pulmonary HTN. The etiology of his endocarditis remains unknown. He was admitted to the SICU pre-operatively for IABP placement 5/23/18. Underwent cardiac surgery 5/25/18.   No personal or family hx of DM  Lab Results   Component Value Date    HGBA1C 5.4 02/22/2018     Endocrine consulted for BG mgmt.             Interval HPI:   Overnight events: remains in CTSU, on antibiotics.Temp stable overnight. BG at goal overnight. Remains on TPN, D5 at 50 cc/hr and TF are advancing to goal of 50 cc/hr. TPN to run out tonight.   Eating:   NPO  Nausea: No  Hypoglycemia and intervention: No  Fever: No  TPN and/or TF: Yes  If yes, type of TF/TPN and rate: TPN at 50 cc/hr and TF started at 10 cc/hr and were advanced by 10 cc/hr every 4 hours to goal of 50 cc/hr     BP (!) 122/59 (BP Location: Right arm, Patient Position: Lying)   Pulse 66   Temp 96.8 °F (36 °C) (Oral)   Resp 20   Ht 5' 10" (1.778 m)   Wt 76.5 kg (168 lb 11.2 oz)   SpO2 (!) 92%   BMI 24.21 kg/m²       Labs Reviewed and Include      Recent Labs  Lab 06/10/18  0543   *   CALCIUM 8.9   ALBUMIN 2.3*   PROT 5.8*      K 3.6   CO2 30*      BUN 63*   CREATININE 1.7*   ALKPHOS 82   ALT 14   AST 15   BILITOT 0.6     Lab Results   Component Value Date    WBC 7.55 06/10/2018    HGB 8.2 (L) 06/10/2018    HCT 24.6 (L) 06/10/2018    MCV 94 06/10/2018     06/10/2018     No results for input(s): TSH, FREET4 in the last 168 hours.  Lab Results   Component Value Date    HGBA1C 5.4 02/22/2018       Nutritional status:   Body mass index is 24.21 kg/m².  Lab Results   Component Value Date    ALBUMIN 2.3 (L) " 06/10/2018    ALBUMIN 2.2 (L) 06/09/2018    ALBUMIN 2.2 (L) 06/08/2018     Lab Results   Component Value Date    PREALBUMIN 20 06/06/2018    PREALBUMIN 12 (L) 02/22/2018       Estimated Creatinine Clearance: 43.5 mL/min (A) (based on SCr of 1.7 mg/dL (H)).    Accu-Checks  Recent Labs      06/08/18   1314  06/08/18   1651  06/09/18   0038  06/09/18   0519  06/09/18   0757  06/09/18   1133  06/09/18   1527  06/09/18   2103  06/10/18   0005  06/10/18   0523   POCTGLUCOSE  226*  215*  218*  258*  188*  205*  164*  137*  110  166*       Current Medications and/or Treatments Impacting Glycemic Control  Immunotherapy:  Immunosuppressants     None        Steroids:   Hormones     None        Pressors:    Autonomic Drugs     None        Hyperglycemia/Diabetes Medications: Antihyperglycemics     Start     Stop Route Frequency Ordered    06/10/18 0400  insulin aspart U-100 pen 7 Units      -- SubQ Every 24 hours (non-standard times) 06/09/18 0709    06/10/18 0000  insulin aspart U-100 pen 7 Units      -- SubQ Every 24 hours (non-standard times) 06/09/18 0709    06/09/18 2000  insulin aspart U-100 pen 7 Units      -- SubQ Every 24 hours (non-standard times) 06/09/18 0709    06/09/18 1600  insulin aspart U-100 pen 7 Units      -- SubQ Every 24 hours (non-standard times) 06/09/18 0709    06/09/18 1200  insulin aspart U-100 pen 7 Units      -- SubQ Every 24 hours (non-standard times) 06/09/18 0709    06/09/18 0800  insulin aspart U-100 pen 7 Units      -- SubQ Every 24 hours (non-standard times) 06/09/18 0709    05/30/18 1703  insulin aspart U-100 pen 0-5 Units      -- SubQ Every 4 hours PRN 05/30/18 1705          ASSESSMENT and PLAN    * S/P MVR (mitral valve replacement)    Managed by CTS  avoid hypoglycemia          Acute hyperglycemia    BG goal 140-180 mg/dl    Continue Novolog 7 units Q4hr with TPN  Continue bg monitoring q4 hours and low dose correction scale; times changed for correction scale coverage. Please administer in  addition to schedule novolog if BG elevated with glucose checkings.     ADDENDUM: TPN to run out tonight; discontinue scheduled insulin. Continue q4 bg monitoring and low dose correction scale.       Please page endocrine NP on call with any BG related issues or concerns and with any change in nutrition plans.       Discharge plans-TBD; will depend on nutrition plans.              S/P CABG x 1    Managed by  CTS  avoid hypoglycemia          Chronic systolic congestive heart failure    Per CTS  avoid hypoglycemia            Sequelae of hyperalimentation    Likely causing increase insulin needs.   Rate decreased this AM to 25 cc/hr. Will stop after 11 hours.    TPN rate increased 6/8/18 back to 50 cc/hr  Enteral nutrition being started- trickle feeds on 6/8/18  To be stopped night of 6/10/18        On enteral nutrition    Trickle feeds started 6/8/18- advanced overnight on 6/9-6/10 to goal of 50 cc/hr  At goal 6/10/18 - will assess insulin needs on TF alone.           ANNA (acute kidney injury)    Estimated Creatinine Clearance: 43.5 mL/min (A) (based on SCr of 1.7 mg/dL (H)).  avoid hypoglycemia  Caution with insulin stacking  Lab Results   Component Value Date    CREATININE 1.7 (H) 06/10/2018                   Lynn Canseco NP  Endocrinology  [facility]

## 2018-06-10 NOTE — ASSESSMENT & PLAN NOTE
BG goal 140-180 mg/dl    Continue Novolog 7 units Q4hr with TPN  Continue bg monitoring q4 hours and low dose correction scale; times changed for correction scale coverage. Please administer in addition to schedule novolog if BG elevated with glucose checkings.     ADDENDUM: TPN to run out tonight; discontinue scheduled insulin. Continue q4 bg monitoring and low dose correction scale.       Please page endocrine NP on call with any BG related issues or concerns and with any change in nutrition plans.       Discharge plans-TBD; will depend on nutrition plans.

## 2018-06-10 NOTE — PLAN OF CARE
Plan of care reviewed with pt and spouse. VS stable, maintaining oral temp >96. No acute events at this time. No complaints. PRN pain meds given. Sternal precautions maintained. TF increasing q4h as tolerated, currently @ 30/hr, goal 50ml/hr. G tube connected to lackey bag hour after meds given. TPN, D5 and heparin gtt maintained. Glucose checked q4h with insulin given if >120. PTT drawn q6h. IV antibiotics given as ordered. Lackey in place, clear yellow urine. Pt remains free from falls or injury. Bed low and locked, call light and personal belongings within reach. Will continue to monitor. Carin Osman RN

## 2018-06-10 NOTE — CONSULTS
"  Nutrition consult received stating "tube feeds."    Please see note from 6/6 for full RD assessment; RD to complete full reassessment on 6/13.    Recommendations  Recommendation/Intervention:   1. Continue current TPN + lipids - meeting EEN and EPN.   2. If able to start TF, recommend Isosource 1.5 at a goal rate of 50 mL/hr + 2 packets/day Beneprotein - to provide 1875 kcal/day, 94 g protein/day, and 917 mL free fluid/day.   3. As medically able, ADAT to Cardiac with texutre per SLP.   RD to monitor.  "

## 2018-06-10 NOTE — SUBJECTIVE & OBJECTIVE
"Interval HPI:   Overnight events: remains in CTSU, on antibiotics.Temp stable overnight. BG at goal overnight. Remains on TPN, D5 at 50 cc/hr and TF are advancing to goal of 50 cc/hr. TPN to run out tonight.   Eating:   NPO  Nausea: No  Hypoglycemia and intervention: No  Fever: No  TPN and/or TF: Yes  If yes, type of TF/TPN and rate: TPN at 50 cc/hr and TF started at 10 cc/hr and were advanced by 10 cc/hr every 4 hours to goal of 50 cc/hr     BP (!) 122/59 (BP Location: Right arm, Patient Position: Lying)   Pulse 66   Temp 96.8 °F (36 °C) (Oral)   Resp 20   Ht 5' 10" (1.778 m)   Wt 76.5 kg (168 lb 11.2 oz)   SpO2 (!) 92%   BMI 24.21 kg/m²     Labs Reviewed and Include      Recent Labs  Lab 06/10/18  0543   *   CALCIUM 8.9   ALBUMIN 2.3*   PROT 5.8*      K 3.6   CO2 30*      BUN 63*   CREATININE 1.7*   ALKPHOS 82   ALT 14   AST 15   BILITOT 0.6     Lab Results   Component Value Date    WBC 7.55 06/10/2018    HGB 8.2 (L) 06/10/2018    HCT 24.6 (L) 06/10/2018    MCV 94 06/10/2018     06/10/2018     No results for input(s): TSH, FREET4 in the last 168 hours.  Lab Results   Component Value Date    HGBA1C 5.4 02/22/2018       Nutritional status:   Body mass index is 24.21 kg/m².  Lab Results   Component Value Date    ALBUMIN 2.3 (L) 06/10/2018    ALBUMIN 2.2 (L) 06/09/2018    ALBUMIN 2.2 (L) 06/08/2018     Lab Results   Component Value Date    PREALBUMIN 20 06/06/2018    PREALBUMIN 12 (L) 02/22/2018       Estimated Creatinine Clearance: 43.5 mL/min (A) (based on SCr of 1.7 mg/dL (H)).    Accu-Checks  Recent Labs      06/08/18   1314  06/08/18   1651  06/09/18   0038  06/09/18   0519  06/09/18   0757  06/09/18   1133  06/09/18   1527  06/09/18   2103  06/10/18   0005  06/10/18   0523   POCTGLUCOSE  226*  215*  218*  258*  188*  205*  164*  137*  110  166*       Current Medications and/or Treatments Impacting Glycemic Control  Immunotherapy:  Immunosuppressants     None        Steroids: "   Hormones     None        Pressors:    Autonomic Drugs     None        Hyperglycemia/Diabetes Medications: Antihyperglycemics     Start     Stop Route Frequency Ordered    06/10/18 0400  insulin aspart U-100 pen 7 Units      -- SubQ Every 24 hours (non-standard times) 06/09/18 0709    06/10/18 0000  insulin aspart U-100 pen 7 Units      -- SubQ Every 24 hours (non-standard times) 06/09/18 0709    06/09/18 2000  insulin aspart U-100 pen 7 Units      -- SubQ Every 24 hours (non-standard times) 06/09/18 0709    06/09/18 1600  insulin aspart U-100 pen 7 Units      -- SubQ Every 24 hours (non-standard times) 06/09/18 0709    06/09/18 1200  insulin aspart U-100 pen 7 Units      -- SubQ Every 24 hours (non-standard times) 06/09/18 0709    06/09/18 0800  insulin aspart U-100 pen 7 Units      -- SubQ Every 24 hours (non-standard times) 06/09/18 0709    05/30/18 1703  insulin aspart U-100 pen 0-5 Units      -- SubQ Every 4 hours PRN 05/30/18 1708

## 2018-06-10 NOTE — ASSESSMENT & PLAN NOTE
Estimated Creatinine Clearance: 43.5 mL/min (A) (based on SCr of 1.7 mg/dL (H)).  avoid hypoglycemia  Caution with insulin stacking  Lab Results   Component Value Date    CREATININE 1.7 (H) 06/10/2018

## 2018-06-10 NOTE — ASSESSMENT & PLAN NOTE
Likely causing increase insulin needs.   Rate decreased this AM to 25 cc/hr. Will stop after 11 hours.    TPN rate increased 6/8/18 back to 50 cc/hr  Enteral nutrition being started- trickle feeds on 6/8/18  To be stopped night of 6/10/18

## 2018-06-10 NOTE — PROGRESS NOTES
"Pt stated that he is seeing "colorfully dots, really pretty dots" States that this has been going on for a few days now and that he hasn't told anyone because he figured it would just go away. Pt is otherwise, neuro intact. Denies lightheadedness or HA. MD on call notified.  "

## 2018-06-11 PROBLEM — E68 SEQUELAE OF HYPERALIMENTATION: Status: RESOLVED | Noted: 2018-06-05 | Resolved: 2018-06-11

## 2018-06-11 LAB
ALBUMIN SERPL BCP-MCNC: 2.2 G/DL
ALP SERPL-CCNC: 85 U/L
ALT SERPL W/O P-5'-P-CCNC: 14 U/L
ANION GAP SERPL CALC-SCNC: 10 MMOL/L
APTT BLDCRRT: 30.8 SEC
APTT BLDCRRT: 31.6 SEC
APTT BLDCRRT: 32.2 SEC
APTT BLDCRRT: 35.1 SEC
AST SERPL-CCNC: 19 U/L
BASOPHILS # BLD AUTO: 0.01 K/UL
BASOPHILS NFR BLD: 0.2 %
BILIRUB SERPL-MCNC: 0.6 MG/DL
BUN SERPL-MCNC: 67 MG/DL
CALCIUM SERPL-MCNC: 8.9 MG/DL
CHLORIDE SERPL-SCNC: 104 MMOL/L
CO2 SERPL-SCNC: 30 MMOL/L
CREAT SERPL-MCNC: 1.8 MG/DL
DIFFERENTIAL METHOD: ABNORMAL
EOSINOPHIL # BLD AUTO: 0.2 K/UL
EOSINOPHIL NFR BLD: 3.3 %
ERYTHROCYTE [DISTWIDTH] IN BLOOD BY AUTOMATED COUNT: 16.8 %
EST. GFR  (AFRICAN AMERICAN): 44 ML/MIN/1.73 M^2
EST. GFR  (NON AFRICAN AMERICAN): 38.1 ML/MIN/1.73 M^2
GLUCOSE SERPL-MCNC: 120 MG/DL
HCT VFR BLD AUTO: 24.5 %
HGB BLD-MCNC: 7.9 G/DL
IMM GRANULOCYTES # BLD AUTO: 0.04 K/UL
IMM GRANULOCYTES NFR BLD AUTO: 0.6 %
INR PPP: 1.2
LYMPHOCYTES # BLD AUTO: 0.5 K/UL
LYMPHOCYTES NFR BLD: 8.5 %
MAGNESIUM SERPL-MCNC: 3 MG/DL
MCH RBC QN AUTO: 30.6 PG
MCHC RBC AUTO-ENTMCNC: 32.2 G/DL
MCV RBC AUTO: 95 FL
MONOCYTES # BLD AUTO: 0.5 K/UL
MONOCYTES NFR BLD: 8.1 %
NEUTROPHILS # BLD AUTO: 5.1 K/UL
NEUTROPHILS NFR BLD: 79.3 %
NRBC BLD-RTO: 0 /100 WBC
PHOSPHATE SERPL-MCNC: 4.5 MG/DL
PLATELET # BLD AUTO: 164 K/UL
PMV BLD AUTO: 12.9 FL
POCT GLUCOSE: 136 MG/DL (ref 70–110)
POCT GLUCOSE: 139 MG/DL (ref 70–110)
POTASSIUM SERPL-SCNC: 3.6 MMOL/L
PROT SERPL-MCNC: 5.6 G/DL
PROTHROMBIN TIME: 12.3 SEC
RBC # BLD AUTO: 2.58 M/UL
SODIUM SERPL-SCNC: 144 MMOL/L
WBC # BLD AUTO: 6.39 K/UL

## 2018-06-11 PROCEDURE — 83735 ASSAY OF MAGNESIUM: CPT

## 2018-06-11 PROCEDURE — 85025 COMPLETE CBC W/AUTO DIFF WBC: CPT

## 2018-06-11 PROCEDURE — 92526 ORAL FUNCTION THERAPY: CPT

## 2018-06-11 PROCEDURE — 85730 THROMBOPLASTIN TIME PARTIAL: CPT | Mod: 91

## 2018-06-11 PROCEDURE — 63600175 PHARM REV CODE 636 W HCPCS: Performed by: PHYSICIAN ASSISTANT

## 2018-06-11 PROCEDURE — 80053 COMPREHEN METABOLIC PANEL: CPT

## 2018-06-11 PROCEDURE — 99231 SBSQ HOSP IP/OBS SF/LOW 25: CPT | Mod: ,,, | Performed by: NURSE PRACTITIONER

## 2018-06-11 PROCEDURE — 25000003 PHARM REV CODE 250: Performed by: NURSE PRACTITIONER

## 2018-06-11 PROCEDURE — 25000003 PHARM REV CODE 250: Performed by: STUDENT IN AN ORGANIZED HEALTH CARE EDUCATION/TRAINING PROGRAM

## 2018-06-11 PROCEDURE — A4216 STERILE WATER/SALINE, 10 ML: HCPCS | Performed by: THORACIC SURGERY (CARDIOTHORACIC VASCULAR SURGERY)

## 2018-06-11 PROCEDURE — 25000003 PHARM REV CODE 250: Performed by: PHYSICIAN ASSISTANT

## 2018-06-11 PROCEDURE — 97535 SELF CARE MNGMENT TRAINING: CPT

## 2018-06-11 PROCEDURE — 25000003 PHARM REV CODE 250: Performed by: THORACIC SURGERY (CARDIOTHORACIC VASCULAR SURGERY)

## 2018-06-11 PROCEDURE — 20600001 HC STEP DOWN PRIVATE ROOM

## 2018-06-11 PROCEDURE — 85610 PROTHROMBIN TIME: CPT

## 2018-06-11 PROCEDURE — 99232 SBSQ HOSP IP/OBS MODERATE 35: CPT | Mod: ,,, | Performed by: NURSE PRACTITIONER

## 2018-06-11 PROCEDURE — 63600175 PHARM REV CODE 636 W HCPCS: Performed by: NURSE PRACTITIONER

## 2018-06-11 PROCEDURE — 84100 ASSAY OF PHOSPHORUS: CPT

## 2018-06-11 RX ORDER — TAMSULOSIN HYDROCHLORIDE 0.4 MG/1
0.4 CAPSULE ORAL DAILY
Status: DISCONTINUED | OUTPATIENT
Start: 2018-06-11 | End: 2018-06-11

## 2018-06-11 RX ORDER — WARFARIN SODIUM 5 MG/1
5 TABLET ORAL DAILY
Status: DISCONTINUED | OUTPATIENT
Start: 2018-06-11 | End: 2018-06-12

## 2018-06-11 RX ADMIN — MIRTAZAPINE 15 MG: 15 TABLET, FILM COATED ORAL at 09:06

## 2018-06-11 RX ADMIN — Medication 3 ML: at 05:06

## 2018-06-11 RX ADMIN — AMPICILLIN 2 G: 2 INJECTION, POWDER, FOR SOLUTION INTRAVENOUS at 09:06

## 2018-06-11 RX ADMIN — AMPICILLIN 2 G: 2 INJECTION, POWDER, FOR SOLUTION INTRAVENOUS at 10:06

## 2018-06-11 RX ADMIN — HYDRALAZINE HYDROCHLORIDE 25 MG: 25 TABLET, FILM COATED ORAL at 05:06

## 2018-06-11 RX ADMIN — DOXAZOSIN MESYLATE 4 MG: 2 TABLET ORAL at 09:06

## 2018-06-11 RX ADMIN — Medication 10 ML: at 06:06

## 2018-06-11 RX ADMIN — AMPICILLIN 2 G: 2 INJECTION, POWDER, FOR SOLUTION INTRAVENOUS at 05:06

## 2018-06-11 RX ADMIN — CEFTRIAXONE SODIUM 2 G: 2 INJECTION, POWDER, FOR SOLUTION INTRAMUSCULAR; INTRAVENOUS at 11:06

## 2018-06-11 RX ADMIN — Medication 10 ML: at 11:06

## 2018-06-11 RX ADMIN — AMIODARONE HYDROCHLORIDE 400 MG: 200 TABLET ORAL at 09:06

## 2018-06-11 RX ADMIN — RAMELTEON 8 MG: 8 TABLET, FILM COATED ORAL at 09:06

## 2018-06-11 RX ADMIN — AMPICILLIN 2 G: 2 INJECTION, POWDER, FOR SOLUTION INTRAVENOUS at 04:06

## 2018-06-11 RX ADMIN — AMLODIPINE BESYLATE 10 MG: 10 TABLET ORAL at 09:06

## 2018-06-11 RX ADMIN — DEXTROSE: 5 SOLUTION INTRAVENOUS at 11:06

## 2018-06-11 RX ADMIN — ASPIRIN 81 MG CHEWABLE TABLET 324 MG: 81 TABLET CHEWABLE at 09:06

## 2018-06-11 RX ADMIN — HEPARIN SODIUM AND DEXTROSE 500 UNITS/HR: 10000; 5 INJECTION INTRAVENOUS at 07:06

## 2018-06-11 RX ADMIN — ATORVASTATIN CALCIUM 40 MG: 20 TABLET, FILM COATED ORAL at 09:06

## 2018-06-11 RX ADMIN — HYDRALAZINE HYDROCHLORIDE 25 MG: 25 TABLET, FILM COATED ORAL at 09:06

## 2018-06-11 RX ADMIN — HYDRALAZINE HYDROCHLORIDE 25 MG: 25 TABLET, FILM COATED ORAL at 03:06

## 2018-06-11 RX ADMIN — FAMOTIDINE 20 MG: 20 TABLET ORAL at 09:06

## 2018-06-11 RX ADMIN — WARFARIN SODIUM 5 MG: 5 TABLET ORAL at 04:06

## 2018-06-11 RX ADMIN — Medication 10 ML: at 05:06

## 2018-06-11 RX ADMIN — Medication 3 ML: at 03:06

## 2018-06-11 RX ADMIN — DOCUSATE SODIUM 10 MG: 50 LIQUID ORAL at 11:06

## 2018-06-11 NOTE — PROGRESS NOTES
Dr. Go at bedside, VORB to bladder scan patient and place lackey catheter if 300 or greater present in bladder. Will continue to monitor.

## 2018-06-11 NOTE — PROGRESS NOTES
[facility]  Cardiothoracic Surgery  Progress Note    Patient Name: Rodolfo Messina  MRN: [id number]  Admission Date: 5/23/2018  Hospital Length of Stay: 18 days  Code Status: Full Code   Attending Physician: Marvin Go MD   Referring Provider: Marvin Go MD  Principal Problem:S/P MVR (mitral valve replacement)    Subjective:     Post-Op Info:  Procedure(s) (LRB):  Mitral Valve Replacement (N/A)  AORTOCORONARY BYPASS-CABG (N/A)   16 Days Post-Op     Interval History: No acute events overnight. Doing well, continue to work with PT.    Medications:  Continuous Infusions:   dextrose 5 % 50 mL/hr at 06/10/18 1701    heparin (porcine) in 5 % dex 500 Units/hr (06/09/18 1347)     Scheduled Meds:   amiodarone  400 mg Per G Tube BID    amLODIPine  10 mg Per G Tube Daily    ampicillin IVPB  2 g Intravenous Q6H    aspirin  324 mg Per G Tube Daily    atorvastatin  40 mg Per G Tube Daily    cefTRIAXone (ROCEPHIN) IVPB  2 g Intravenous Q12H    doxazosin  4 mg Per G Tube Daily    famotidine  20 mg Per G Tube Daily    hydrALAZINE  25 mg Per G Tube Q8H    mirtazapine  15 mg Per G Tube QHS    polyethylene glycol  17 g Per G Tube Daily    ramelteon  8 mg Per G Tube QHS    sodium chloride 0.9%  10 mL Intravenous Q6H    sodium chloride 0.9%  3 mL Intravenous Q8H    warfarin  3 mg Per G Tube Daily     PRN Meds:sodium chloride, albumin human 5%, albuterol-ipratropium, bisacodyl, dextrose 50%, docusate, glucagon (human recombinant), hydrALAZINE, insulin aspart U-100, lactated ringers, metoclopramide HCl, morphine, ondansetron, oxyCODONE, Flushing PICC Protocol **AND** sodium chloride 0.9% **AND** sodium chloride 0.9%, white petrolatum-mineral oil     Objective:     Vital Signs (Most Recent):  Temp: 97.4 °F (36.3 °C) (06/10/18 1945)  Pulse: 64 (06/10/18 2028)  Resp: 17 (06/10/18 1945)  BP: (!) 117/56 (06/10/18 1945)  SpO2: (!) 92 % (06/10/18 1945) Vital Signs (24h Range):  Temp:  [96.3 °F (35.7  °C)-98.2 °F (36.8 °C)] 97.4 °F (36.3 °C)  Pulse:  [64-76] 64  Resp:  [17-20] 17  SpO2:  [91 %-94 %] 92 %  BP: (106-136)/(52-62) 117/56     Weight: 76.5 kg (168 lb 11.2 oz)  Body mass index is 24.21 kg/m².    SpO2: (!) 92 %  O2 Device (Oxygen Therapy): nasal cannula    Intake/Output - Last 3 Shifts       06/08 0700 - 06/09 0659 06/09 0700 - 06/10 0659 06/10 0700 - 06/11 0659    P.O. 0 0 0    I.V. (mL/kg) 948.5 (12.6) 440 (5.8)     Blood 377 313.3     NG/GT  248 490    IV Piggyback 550 250 250     400 400    Total Intake(mL/kg) 2490.5 (33.1) 1651.3 (21.6) 1140 (14.9)    Urine (mL/kg/hr) 1000 (0.6) 1250 (0.7) 700 (0.6)    Drains  210 (0.1)     Total Output 1000 1460 700    Net +1490.5 +191.3 +440           Stool Occurrence 0 x 1 x 1 x          Lines/Drains/Airways     Peripherally Inserted Central Catheter Line                 PICC Double Lumen 03/05/18 1539 right basilic 97 days         PICC Double Lumen 06/04/18 1654 left brachial 6 days          Central Venous Catheter Line                 Percutaneous Central Line Insertion/Assessment - Quad lumen  05/25/18 0800 left subclavian 16 days          Drain                 Urethral Catheter 06/06/18 0030 4 days         Gastrostomy/Enterostomy 06/07/18 1657 Gastrostomy-jejunostomy LUQ 3 days          Line                 Pacer Wires 05/25/18 1355 16 days          Peripheral Intravenous Line                 Peripheral IV - Single Lumen 06/07/18 2236 Right Antecubital 3 days                Physical Exam   Constitutional: He is oriented to person, place, and time. He appears cachectic.   HENT:   Head: Normocephalic and atraumatic.   Eyes: Pupils are equal, round, and reactive to light.   Neck: Normal range of motion. Neck supple.   Cardiovascular: Normal rate and regular rhythm.    Incision c/d/i   Pulmonary/Chest: Effort normal.   Abdominal: Soft. Bowel sounds are normal.   Musculoskeletal: Normal range of motion.   Neurological: He is alert and oriented to person,  place, and time.   Skin: Skin is warm and dry.   Psychiatric: He has a normal mood and affect. His behavior is normal.       Significant Labs:  CBC:   Recent Labs  Lab 06/10/18  0543   WBC 7.55   RBC 2.63*   HGB 8.2*   HCT 24.6*      MCV 94   MCH 31.2*   MCHC 33.3     CMP:   Recent Labs  Lab 06/10/18  0543 06/10/18  1533   *  --    CALCIUM 8.9  --    ALBUMIN 2.3*  --    PROT 5.8*  --      --    K 3.6 4.1   CO2 30*  --      --    BUN 63*  --    CREATININE 1.7*  --    ALKPHOS 82  --    ALT 14  --    AST 15  --    BILITOT 0.6  --      Coagulation:   Recent Labs  Lab 06/10/18  0543  06/10/18  1837   INR 1.2  --   --    APTT 29.2  < > 32.0   < > = values in this interval not displayed.      Assessment/Plan:     * S/P MVR (mitral valve replacement)    --Continue ASA, statin (holding BB)  --Sternal precautions  --PT/OT  --Ambulate x4   --Wean O2 as tolerated for O2 sat >92%  --monitor CXR    --Monitor electrolytes and replace prn  --hold Lasix  --D5W @50  -- Q6  --continue  lackey   --strict I&Os  --did not pass modified swallow; PICC and TPN  --G-J tube placed; increase TF to goal        DISPO: discharge planning on going for likely rehab           Dysphagia    --SLP following  --failed last BMS study   --GJ tube placed by IR 6/7  --initiate TF 10ml/hr        Impaired functional mobility and endurance    --discharging to rehab  --PT/OT daily         On enteral nutrition    --continue TPN with lipids until GJ tube placed- transitioning off, dose halfed         Epistaxis    --Noted to have post traumatic left sided epistaxis following attempted NGT placement. On the time of evaluation patient was hemostatic. No active bleeding noted.     -Given that patient is hemostatic, no acute intervention   -Recommend minimize nasal trauma as best possible  -If requires oxygen, ideal if it is humidified via face tent and not nasal canula  -Bacitracin to bilateral nares nightly  -Afrin q6hrs PRN bleeding  (only if ok from Cards prospective)  -Ocean nasal spray q6hrs while awake (starting in a couple days, allow stable clot to form for now)  -If bleeding restarts, hold pressure to fleshy portion of nose (10 mins) and lean forward  -For bleeding that does not stop, page resident  -Remainder of medical management per primary team        Atrial flutter    --EP following appreciate recs., will reach out to them- pt currently in Afib that began 6/6- Amio IV load and infusion   --holding BB        S/P CABG x 1    --see MVR         Chronic systolic congestive heart failure    - ASA, statin  - D/c beta blocker  - EP following; appreciate recs          ANNA (acute kidney injury)    --Elevated Cr  --holding Stuart Hawkins MD  Cardiothoracic Surgery  [facility]

## 2018-06-11 NOTE — PROGRESS NOTES
[facility]  Cardiothoracic Surgery  Progress Note    Patient Name: Rodolfo Messina  MRN: [id number]  Admission Date: 5/23/2018  Hospital Length of Stay: 19 days  Code Status: Full Code   Attending Physician: Marvin Go MD   Referring Provider: Marvin Go MD  Principal Problem:S/P MVR (mitral valve replacement)    Subjective:     Post-Op Info:  Procedure(s) (LRB):  Mitral Valve Replacement (N/A)  AORTOCORONARY BYPASS-CABG (N/A)   17 Days Post-Op     Interval History: NAEON. NSR on monitor     Medications:  Continuous Infusions:   dextrose 5 % 50 mL/hr at 06/10/18 1701    heparin (porcine) in 5 % dex 500 Units/hr (06/09/18 1347)     Scheduled Meds:   amiodarone  400 mg Per G Tube BID    amLODIPine  10 mg Per G Tube Daily    ampicillin IVPB  2 g Intravenous Q6H    aspirin  324 mg Per G Tube Daily    atorvastatin  40 mg Per G Tube Daily    cefTRIAXone (ROCEPHIN) IVPB  2 g Intravenous Q12H    doxazosin  4 mg Per G Tube Daily    famotidine  20 mg Per G Tube Daily    hydrALAZINE  25 mg Per G Tube Q8H    mirtazapine  15 mg Per G Tube QHS    polyethylene glycol  17 g Per G Tube Daily    ramelteon  8 mg Per G Tube QHS    sodium chloride 0.9%  10 mL Intravenous Q6H    sodium chloride 0.9%  3 mL Intravenous Q8H    warfarin  5 mg Per G Tube Daily     PRN Meds:sodium chloride, albumin human 5%, albuterol-ipratropium, bisacodyl, dextrose 50%, docusate, glucagon (human recombinant), hydrALAZINE, insulin aspart U-100, lactated ringers, metoclopramide HCl, morphine, ondansetron, oxyCODONE, Flushing PICC Protocol **AND** sodium chloride 0.9% **AND** sodium chloride 0.9%, white petrolatum-mineral oil     Objective:     Vital Signs (Most Recent):  Temp: 98.4 °F (36.9 °C) (06/11/18 0800)  Pulse: 62 (06/11/18 0800)  Resp: 16 (06/11/18 0800)  BP: 127/60 (06/11/18 0800)  SpO2: (!) 86 % (06/11/18 0800) Vital Signs (24h Range):  Temp:  [97.4 °F (36.3 °C)-98.4 °F (36.9 °C)] 98.4 °F (36.9 °C)  Pulse:   [62-70] 62  Resp:  [16-20] 16  SpO2:  [86 %-96 %] 86 %  BP: (106-136)/(52-60) 127/60     Weight: 76.8 kg (169 lb 6.4 oz)  Body mass index is 24.31 kg/m².    SpO2: (!) 86 %  O2 Device (Oxygen Therapy): room air    Intake/Output - Last 3 Shifts       06/09 0700 - 06/10 0659 06/10 0700 - 06/11 0659 06/11 0700 - 06/12 0659    P.O. 0 0     I.V. (mL/kg) 440 (5.8)      Blood 313.3      NG/ 490 230    IV Piggyback 250 250      400     Total Intake(mL/kg) 1651.3 (21.6) 1140 (14.8) 230 (3)    Urine (mL/kg/hr) 1250 (0.7) 1150 (0.6)     Drains 210 (0.1)  800 (2.6)    Total Output 1460 1150 800    Net +191.3 -10 -570           Stool Occurrence 1 x 1 x           Lines/Drains/Airways     Peripherally Inserted Central Catheter Line                 PICC Double Lumen 03/05/18 1539 right basilic 97 days         PICC Double Lumen 06/04/18 1654 left brachial 6 days          Central Venous Catheter Line                 Percutaneous Central Line Insertion/Assessment - Quad lumen  05/25/18 0800 left subclavian 17 days          Drain                 Gastrostomy/Enterostomy 06/07/18 1657 Gastrostomy-jejunostomy LUQ 3 days          Line                 Pacer Wires 05/25/18 1355 16 days          Peripheral Intravenous Line                 Peripheral IV - Single Lumen 06/07/18 2236 Right Antecubital 3 days                Physical Exam   Constitutional: He is oriented to person, place, and time. He appears well-developed and well-nourished.   Cardiovascular: Normal rate, regular rhythm and normal heart sounds.    Pulmonary/Chest: Effort normal and breath sounds normal.   Abdominal: Soft.   PEG intact     Neurological: He is alert and oriented to person, place, and time.   Skin: Skin is warm, dry and intact.   Psychiatric: He has a normal mood and affect.       Significant Labs:  BMP:   Recent Labs  Lab 06/11/18  0433   *      K 3.6      CO2 30*   BUN 67*   CREATININE 1.8*   CALCIUM 8.9   MG 3.0*     CBC:   Recent  Labs  Lab 06/11/18  0433   WBC 6.39   RBC 2.58*   HGB 7.9*   HCT 24.5*      MCV 95   MCH 30.6   MCHC 32.2     Coagulation:   Recent Labs  Lab 06/11/18  0433   INR 1.2   APTT 31.6     Assessment/Plan:     * S/P MVR (mitral valve replacement)    --Continue ASA, statin (holding BB)  --Sternal precautions  --PT/OT  --Ambulate x4   --Wean O2 as tolerated for O2 sat >92%  --monitor CXR    --Monitor electrolytes and replace prn  --hold Lasix  --D5W @50  -- Q6  --continue  lackey   --strict I&Os  --did not pass modified swallow; PICC and TPN  --G-J tube placed; increase TF to goal        DISPO: discharge planning on going for rehab           S/P CABG x 1    --see MVR         Dysphagia    --SLP following  --failed last BMS study   --GJ tube placed by IR 6/7  --Goal TF 50ml/hr        Impaired functional mobility and endurance    --discharging to rehab  --PT/OT daily         On enteral nutrition    --continue TPN with lipids until GJ tube placed- transitioning off, dose halfed         Epistaxis    --Noted to have post traumatic left sided epistaxis following attempted NGT placement. On the time of evaluation patient was hemostatic. No active bleeding noted.     -Given that patient is hemostatic, no acute intervention   -Recommend minimize nasal trauma as best possible  -If requires oxygen, ideal if it is humidified via face tent and not nasal canula  -Bacitracin to bilateral nares nightly  -Afrin q6hrs PRN bleeding (only if ok from Cards prospective)  -Ocean nasal spray q6hrs while awake (starting in a couple days, allow stable clot to form for now)  -If bleeding restarts, hold pressure to fleshy portion of nose (10 mins) and lean forward  -For bleeding that does not stop, page resident  -Remainder of medical management per primary team        Atrial flutter    --EP following appreciate recs., will reach out to them- pt currently in Afib that began 6/6- Amio IV load and infusion   --holding BB        Chronic  systolic congestive heart failure    - ASA, statin  - D/c beta blocker  - EP following; appreciate recs          ANNA (acute kidney injury)    --Elevated Cr  --holding Stuart Lopez NP  Cardiothoracic Surgery  [facility]

## 2018-06-11 NOTE — PROGRESS NOTES
[facility]  Physical Medicine & Rehab  Progress Note    Patient Name: Rodolfo Messina  MRN: [id number]  Admission Date: 5/23/2018  Length of Stay: 19 days  Attending Physician: Marvin Go MD    Subjective:     Principal Problem:S/P MVR (mitral valve replacement)       (06/11/2018): Patient is seen for follow-up rehab evaluation and recommendations.   Participated with therapy.  Barriers for discharge/rehab admission: heparin gtt.    HPI, Past Medical, Surgical, Family, and Social History remains the same as documented in the initial encounter.    Hospital Course:   5/29/18: Evaluated by therapy.   Sit to stand Shereen-CGA with dizziness.  Ambulated total 20 ft Shereen.  UBD ModA.  LBD/toileting MaxA. Grooming Shereen.   6/7/18: Participating with therapy. Bed mobility CGA.  Sit to stand CGA.  Ambulated 48 ft CGA with SOB.  Grooming CGA.   6/8/18:  Declined PT and OT initially.  Then, after OT's 2nd attempt participated. Bed mobility SV-ModA  Sit to stand Shereen.     Past Medical History:   Diagnosis Date    ANNA (acute kidney injury) 2/22/2018    Anemia     Anxiety     Arthritis     BPH (benign prostatic hyperplasia)     CHF (congestive heart failure) 5/23/2018    Coronary artery disease of native artery of native heart with stable angina pectoris 5/21/2018    Diverticulosis     Encounter for blood transfusion     Hypertension     Urinary retention      Past Surgical History:   Procedure Laterality Date    APPENDECTOMY      COLONOSCOPY      COLONOSCOPY N/A 2/1/2018    Procedure: COLONOSCOPY;  Surgeon: Richar Payan MD;  Location: [facility] (4TH FLR);  Service: Endoscopy;  Laterality: N/A;  PM prep    CREATION OF AORTOCORONARY ARTERY BYPASS N/A 5/25/2018    Procedure: AORTOCORONARY BYPASS-CABG;  Surgeon: Marvin Go MD;  Location: [facility] OR 2ND FLR;  Service: Cardiovascular;  Laterality: N/A;    EYE SURGERY Right     cataract extraction    FINGER SURGERY      FRACTURE SURGERY Right      index finger    MITRAL VALVE REPLACEMENT N/A 5/25/2018    Procedure: Mitral Valve Replacement;  Surgeon: Marvin Go MD;  Location: [facility] OR [address] [place] [address];  Service: Cardiovascular;  Laterality: N/A;    TONSILLECTOMY       Review of patient's allergies indicates:   Allergen Reactions    Shellfish containing products     Augmentin [amoxicillin-pot clavulanate]      Patient felt that it raised BP and requested to have it added as intolerance. Tolerated unasyn and ampicillin.    Ciprofloxacin     Flagyl [metronidazole]     Lisinopril Other (See Comments)     cough    Mysoline [primidone]     Omnicef [cefdinir]        Scheduled Medications:    amiodarone  400 mg Per G Tube BID    amLODIPine  10 mg Per G Tube Daily    ampicillin IVPB  2 g Intravenous Q6H    aspirin  324 mg Per G Tube Daily    atorvastatin  40 mg Per G Tube Daily    cefTRIAXone (ROCEPHIN) IVPB  2 g Intravenous Q12H    doxazosin  4 mg Per G Tube Daily    famotidine  20 mg Per G Tube Daily    hydrALAZINE  25 mg Per G Tube Q8H    mirtazapine  15 mg Per G Tube QHS    polyethylene glycol  17 g Per G Tube Daily    ramelteon  8 mg Per G Tube QHS    sodium chloride 0.9%  10 mL Intravenous Q6H    sodium chloride 0.9%  3 mL Intravenous Q8H    warfarin  5 mg Per G Tube Daily       PRN Medications: sodium chloride, albumin human 5%, albuterol-ipratropium, bisacodyl, dextrose 50%, docusate, glucagon (human recombinant), hydrALAZINE, insulin aspart U-100, lactated ringers, metoclopramide HCl, morphine, ondansetron, oxyCODONE, Flushing PICC Protocol **AND** sodium chloride 0.9% **AND** sodium chloride 0.9%, white petrolatum-mineral oil    Family History     Problem Relation (Age of Onset)    Heart disease Mother, Father    No Known Problems Sister, Brother    Stroke Mother        Social History Main Topics    Smoking status: Never Smoker    Smokeless tobacco: Never Used    Alcohol use No      Comment: none recently    Drug use: No    Sexual  activity: Not Currently     Partners: Female     Review of Systems   Constitutional: Positive for fatigue. Negative for chills and fever.   HENT: Positive for trouble swallowing. Negative for voice change.    Eyes: Negative for photophobia and visual disturbance.   Respiratory: Positive for shortness of breath. Negative for cough and wheezing.    Cardiovascular: Negative for chest pain and palpitations.   Gastrointestinal: Positive for constipation. Negative for abdominal distention, nausea and vomiting.   Genitourinary: Negative for difficulty urinating and flank pain.   Musculoskeletal: Positive for gait problem. Negative for arthralgias and myalgias.   Skin: Negative for color change and rash.   Neurological: Positive for weakness. Negative for speech difficulty, numbness and headaches.   Psychiatric/Behavioral: Negative for agitation and confusion.     Objective:     Vital Signs (Most Recent):  Temp: 98.3 °F (36.8 °C) (06/11/18 1120)  Pulse: 67 (06/11/18 1120)  Resp: 18 (06/11/18 1120)  BP: (!) 93/53 (06/11/18 1120)  SpO2: (!) 92 % (06/11/18 1120)    Vital Signs (24h Range):  Temp:  [97.4 °F (36.3 °C)-98.4 °F (36.9 °C)] 98.3 °F (36.8 °C)  Pulse:  [62-70] 67  Resp:  [16-20] 18  SpO2:  [86 %-96 %] 92 %  BP: ()/(52-60) 93/53     Body mass index is 24.31 kg/m².    Physical Exam   Constitutional: He is oriented to person, place, and time. He appears well-developed.   Thin  Wife at bedside   HENT:   Head: Normocephalic and atraumatic.   Eyes: EOM are normal. Pupils are equal, round, and reactive to light.   Neck: Neck supple.   Cardiovascular: Normal rate and regular rhythm.    Pulmonary/Chest: Effort normal. No respiratory distress.   Abdominal: Soft. There is no tenderness.   JG tube noted   Musculoskeletal: Normal range of motion. He exhibits no deformity.   Neurological: He is alert and oriented to person, place, and time. No sensory deficit. He exhibits normal muscle tone.   -no focal weakness   Skin: Skin  is warm and dry.   Psychiatric: He has a normal mood and affect. His behavior is normal.      Diagnostic Results: Labs: Reviewed      Assessment/Plan:      * S/P MVR (mitral valve replacement)    -h/o mitral valve endocarditis and resultant severe MR, TR and pulmonary HTN  -Heart Transplant services following  -s/p MVR, CABG x 1 on 5/25  -BB held         Dysphagia    -GJ tube present        Impaired functional mobility and endurance    -2/2 MV replacement and complicated hospital course      See hospital course for functional, cognitive/speech/language, and nutrition/swallow status.      Recommendations  -  Encourage mobility, OOB in chair at least 3 hours per day, and early ambulation as appropriate  -  PT/OT evaluate and treat  -  Pain management  -  Monitor for and prevent skin breakdown and pressure ulcers  · Early mobility, repositioning/weight shifting every 20-30 minutes when sitting, turn patient every 2 hours, proper mattress/overlay and chair cushioning, pressure relief/heel protector boots  -  DVT prophylaxis:  Heparin gtt   -  Reviewed discharge options (IP rehab, SNF, HH therapy, and OP therapy)          On enteral nutrition      -GJ tube placed 6/7        Atrial flutter    -EP consulted  - resolved        S/P CABG x 1    -s/p on 5/25        ANNA (acute kidney injury)    -kidney function still elevated on 6/11  -Lasix held         Symptomatic anemia    -H/H 7.9/24 on 6/11        Recommend Inpatient Rehab.  IRF preference per patient/[facility].  Barriers to IRF admission:  Insurance approval and Heparin gtt. Orehab not covered by patient's insurance. Will sign off.  Please call with questions/concerns or re-consult if situation changes.        Mary Storm NP  Department of Physical Medicine & Rehab   [facility]

## 2018-06-11 NOTE — PLAN OF CARE
Problem: SLP Goal  Goal: SLP Goal  Speech Language Pathology Goals  Goals expected to be met by 6/11  1. Pt will tolerate puree & honey thickened liquids for pleasure while completing 2-3 swallows per bolus w/o overt S/S aspiration, MIN A   3. Educate Pt and family on safe swallow strategies and S/S aspiration   4. Continue to assess swallow to determine feasibility of PO upgrade   5. Pt will complete dysphagia exercises given min A      Speech Language Pathology Goals  Goals expected to be met by 6/7:  1. Pt will tolerate trials of puree w/o overt S/S aspiration, MIN A - CONTINUE  2. Pt will tolerate trials of nectar-thickened liquids w/o overt S/S aspiration, MIN A - DISCONTINUE  3. Educate Pt and family on safe swallow strategies and S/S aspiration - CONTINUE  4. Continue to assess swallow to determine feasibility of PO upgrade - CONTINUE      Goals expected to be met by 6/4: goals not met 2/2 transfer to ICU 5/30  1. Pt will participate in ongoing swallowing assessment to determine safest and least restrictive diet.          Outcome: Ongoing (interventions implemented as appropriate)  Pt participated in tx session well.  Goals remain appropriate.  Cont ST per POC.    Leyda Chopra CCC-SLP  6/11/2018

## 2018-06-11 NOTE — PROGRESS NOTES
"[facility]  Endocrinology  Progress Note    Admit Date: 5/23/2018     Reason for Consult: Management of  Hyperglycemia     Surgical Procedure and Date: s/p CABG x 1, MVR  5/25/18; GJ tube placement in IR on 6/7/18     HPI: Patient is a 67 y.o. male with a diagnosis of mitral valve endocarditis and resultant severe MR, TR and pulmonary HTN. The etiology of his endocarditis remains unknown. He was admitted to the SICU pre-operatively for IABP placement 5/23/18. Underwent cardiac surgery 5/25/18.   No personal or family hx of DM  Lab Results   Component Value Date    HGBA1C 5.4 02/22/2018     Endocrine consulted for BG mgmt.             Interval HPI:   Overnight events: remains in CTSU. TF to goal of 50 cc/hr. Scheduled insulin stopped overnight when TPN stopped. Currently BG at goal while on TF.   Eating:   NPO  Nausea: No  Hypoglycemia and intervention: No  Fever: No  TF: Yes isosource 1.5 at 50 cc/hr; TPN suspended overnight.     BP (!) 93/53 (BP Location: Right arm, Patient Position: Sitting)   Pulse 67   Temp 98.3 °F (36.8 °C) (Oral)   Resp 18   Ht 5' 10" (1.778 m)   Wt 76.8 kg (169 lb 6.4 oz)   SpO2 (!) 92%   BMI 24.31 kg/m²       Labs Reviewed and Include      Recent Labs  Lab 06/11/18  0433   *   CALCIUM 8.9   ALBUMIN 2.2*   PROT 5.6*      K 3.6   CO2 30*      BUN 67*   CREATININE 1.8*   ALKPHOS 85   ALT 14   AST 19   BILITOT 0.6     Lab Results   Component Value Date    WBC 6.39 06/11/2018    HGB 7.9 (L) 06/11/2018    HCT 24.5 (L) 06/11/2018    MCV 95 06/11/2018     06/11/2018     No results for input(s): TSH, FREET4 in the last 168 hours.  Lab Results   Component Value Date    HGBA1C 5.4 02/22/2018       Nutritional status:   Body mass index is 24.31 kg/m².  Lab Results   Component Value Date    ALBUMIN 2.2 (L) 06/11/2018    ALBUMIN 2.3 (L) 06/10/2018    ALBUMIN 2.2 (L) 06/09/2018     Lab Results   Component Value Date    PREALBUMIN 20 06/06/2018    " PREALBUMIN 12 (L) 02/22/2018       Estimated Creatinine Clearance: 41.1 mL/min (A) (based on SCr of 1.8 mg/dL (H)).    Accu-Checks  Recent Labs      06/09/18   1133  06/09/18   1527  06/09/18   2103  06/10/18   0005  06/10/18   0523  06/10/18   0742  06/10/18   1132  06/10/18   1634  06/10/18   1949  06/11/18   1129   POCTGLUCOSE  205*  164*  137*  110  166*  88  169*  138*  123*  136*         Hyperglycemia/Diabetes Medications: Antihyperglycemics     Start     Stop Route Frequency Ordered    05/30/18 1703  insulin aspart U-100 pen 0-5 Units      -- SubQ Every 4 hours PRN 05/30/18 1705          ASSESSMENT and PLAN    * S/P MVR (mitral valve replacement)    Managed by CTS  avoid hypoglycemia          Acute hyperglycemia    BG goal 140-180 mg/dl    Continue bg monitoring q6 hours while on TF and moderate dose correction scale.   If remains with no insulin needs will sign off soon.        Discharge plans-TBD; will depend on nutrition plans.              S/P CABG x 1    Managed by  CTS  avoid hypoglycemia          Chronic systolic congestive heart failure    Per CTS  avoid hypoglycemia            On enteral nutrition    Trickle feeds started 6/8/18- advanced overnight on 6/9-6/10 to goal of 50 cc/hr  At goal 6/10/18 - will assess insulin needs on TF alone.           ANNA (acute kidney injury)    Estimated Creatinine Clearance: 41.1 mL/min (A) (based on SCr of 1.8 mg/dL (H)).  avoid hypoglycemia  Caution with insulin stacking  Lab Results   Component Value Date    CREATININE 1.8 (H) 06/11/2018                   Lynn Canseco NP  Endocrinology  [facility]

## 2018-06-11 NOTE — ASSESSMENT & PLAN NOTE
Estimated Creatinine Clearance: 41.1 mL/min (A) (based on SCr of 1.8 mg/dL (H)).  avoid hypoglycemia  Caution with insulin stacking  Lab Results   Component Value Date    CREATININE 1.8 (H) 06/11/2018

## 2018-06-11 NOTE — ASSESSMENT & PLAN NOTE
--Continue ASA, statin (holding BB)  --Sternal precautions  --PT/OT  --Ambulate x4   --Wean O2 as tolerated for O2 sat >92%  --monitor CXR    --Monitor electrolytes and replace prn  --hold Lasix  --D5W @50  -- Q6  --continue  lackey   --strict I&Os  --did not pass modified swallow; PICC and TPN  --G-J tube placed; increase TF to goal        DISPO: discharge planning on going for rehab

## 2018-06-11 NOTE — PLAN OF CARE
Pt was denied by  rehab.  TRENT spoke with Josey at [facility].  They are reviewing his chart.  SW will f/u as needed.    Radha Montes, [facility] x [phone]

## 2018-06-11 NOTE — PT/OT/SLP PROGRESS
Speech Language Pathology Treatment    Patient Name:  Rodolfo Messina   MRN:  [id number]  Admitting Diagnosis: S/P MVR (mitral valve replacement)    Recommendations:                 General Recommendations:  Dysphagia therapy  Diet recommendations:  NPO, Liquid Diet Level: NPO   Aspiration Precautions: Continue alternate means of nutrition, Frequent oral care and Strict aspiration precautions   General Precautions: Standard, aspiration, fall, NPO  Communication strategies:  none    Subjective     Pt was awake and alert in NAD.  He was seated upright in a bedside chair.  He was agreeable to tx session  Patient goals: to be able to eat cake for his birthday     Pain/Comfort:  · Pain Rating 1: 0/10  · Pain Rating Post-Intervention 1: 0/10    Objective:     Has the patient been evaluated by SLP for swallowing?   Yes  Keep patient NPO? Yes   Current Respiratory Status: nasal cannula      Pt completed dysphagia ex;s x10 reps for tongue base retraction w/ glottal /g, k/, hyolaryngeal elevation w/ falsetto /i/ and effortful swallows x7/10 reps.  He completed mendelsohn maneuver x5 reps and willie maneuver x1/5 reps.  He completed chin tuck against resistance x5 reps.  Swallow reflex initiation delays and lingual pumping persists.  Pt was offered and accepted po trials of puree x3 tsp w/ max cues for 3 effortful swallows.  As pt was provided each trial swallow reflex initiation speed decreased w/ significant effort required for 2nd and 3rd subsequent swallows.  Pt tolerated first two trials w/ no overt signs of airway compromise.  Delayed coughing was seen following final puree trial.      Education was provided to pt and spouse re: SLP role, normal swallow mechanics, need for npo status, dysphagia ex;s and SLP POC.  They indicated good understanding.    Assessment:     Rodolfo Messina is a 67 y.o. male with an SLP diagnosis of Dysphagia.  He presents with severe oropharyngeal dysphagia.    Goals:    SLP Goals         Problem: SLP Goal    Goal Priority Disciplines Outcome   SLP Goal     SLP Ongoing (interventions implemented as appropriate)   Description:  Speech Language Pathology Goals  Goals expected to be met by 6/11  1. Pt will tolerate puree & honey thickened liquids for pleasure while completing 2-3 swallows per bolus w/o overt S/S aspiration, MIN A   3. Educate Pt and family on safe swallow strategies and S/S aspiration   4. Continue to assess swallow to determine feasibility of PO upgrade   5. Pt will complete dysphagia exercises given min A      Speech Language Pathology Goals  Goals expected to be met by 6/7:  1. Pt will tolerate trials of puree w/o overt S/S aspiration, MIN A - CONTINUE  2. Pt will tolerate trials of nectar-thickened liquids w/o overt S/S aspiration, MIN A - DISCONTINUE  3. Educate Pt and family on safe swallow strategies and S/S aspiration - CONTINUE  4. Continue to assess swallow to determine feasibility of PO upgrade - CONTINUE      Goals expected to be met by 6/4: goals not met 2/2 transfer to ICU 5/30  1. Pt will participate in ongoing swallowing assessment to determine safest and least restrictive diet.                           Plan:     · Patient to be seen:  5 x/week   · Plan of Care expires:  06/30/18  · Plan of Care reviewed with:  patient, spouse   · SLP Follow-Up:  Yes       Discharge recommendations:  rehabilitation facility   Barriers to Discharge:  None    Time Tracking:     SLP Treatment Date:   06/11/18  Speech Start Time:  1058  Speech Stop Time:  1118     Speech Total Time (min):  20 min    Billable Minutes: Treatment Swallowing Dysfunction 10 and Seld Care/Home Management Training 10    Leyda Chopra CCC-SLP  06/11/2018

## 2018-06-11 NOTE — ASSESSMENT & PLAN NOTE
--Continue ASA, statin (holding BB)  --Sternal precautions  --PT/OT  --Ambulate x4   --Wean O2 as tolerated for O2 sat >92%  --monitor CXR    --Monitor electrolytes and replace prn  --hold Lasix  --D5W @50  -- Q6  --continue  lackey   --strict I&Os  --did not pass modified swallow; PICC and TPN  --G-J tube placed; increase TF to goal        DISPO: discharge planning on going for likely rehab

## 2018-06-11 NOTE — ASSESSMENT & PLAN NOTE
BG goal 140-180 mg/dl    Continue bg monitoring q6 hours while on TF and moderate dose correction scale.   If remains with no insulin needs will sign off soon.        Discharge plans-TBD; will depend on nutrition plans.

## 2018-06-11 NOTE — SUBJECTIVE & OBJECTIVE
Interval History: NAEON. NSR on monitor     Medications:  Continuous Infusions:   dextrose 5 % 50 mL/hr at 06/10/18 1701    heparin (porcine) in 5 % dex 500 Units/hr (06/09/18 1347)     Scheduled Meds:   amiodarone  400 mg Per G Tube BID    amLODIPine  10 mg Per G Tube Daily    ampicillin IVPB  2 g Intravenous Q6H    aspirin  324 mg Per G Tube Daily    atorvastatin  40 mg Per G Tube Daily    cefTRIAXone (ROCEPHIN) IVPB  2 g Intravenous Q12H    doxazosin  4 mg Per G Tube Daily    famotidine  20 mg Per G Tube Daily    hydrALAZINE  25 mg Per G Tube Q8H    mirtazapine  15 mg Per G Tube QHS    polyethylene glycol  17 g Per G Tube Daily    ramelteon  8 mg Per G Tube QHS    sodium chloride 0.9%  10 mL Intravenous Q6H    sodium chloride 0.9%  3 mL Intravenous Q8H    warfarin  5 mg Per G Tube Daily     PRN Meds:sodium chloride, albumin human 5%, albuterol-ipratropium, bisacodyl, dextrose 50%, docusate, glucagon (human recombinant), hydrALAZINE, insulin aspart U-100, lactated ringers, metoclopramide HCl, morphine, ondansetron, oxyCODONE, Flushing PICC Protocol **AND** sodium chloride 0.9% **AND** sodium chloride 0.9%, white petrolatum-mineral oil     Objective:     Vital Signs (Most Recent):  Temp: 98.4 °F (36.9 °C) (06/11/18 0800)  Pulse: 62 (06/11/18 0800)  Resp: 16 (06/11/18 0800)  BP: 127/60 (06/11/18 0800)  SpO2: (!) 86 % (06/11/18 0800) Vital Signs (24h Range):  Temp:  [97.4 °F (36.3 °C)-98.4 °F (36.9 °C)] 98.4 °F (36.9 °C)  Pulse:  [62-70] 62  Resp:  [16-20] 16  SpO2:  [86 %-96 %] 86 %  BP: (106-136)/(52-60) 127/60     Weight: 76.8 kg (169 lb 6.4 oz)  Body mass index is 24.31 kg/m².    SpO2: (!) 86 %  O2 Device (Oxygen Therapy): room air    Intake/Output - Last 3 Shifts       06/09 0700 - 06/10 0659 06/10 0700 - 06/11 0659 06/11 0700 - 06/12 0659    P.O. 0 0     I.V. (mL/kg) 440 (5.8)      Blood 313.3      NG/ 490 230    IV Piggyback 250 250      400     Total Intake(mL/kg) 1651.3 (21.6)  1140 (14.8) 230 (3)    Urine (mL/kg/hr) 1250 (0.7) 1150 (0.6)     Drains 210 (0.1)  800 (2.6)    Total Output 1460 1150 800    Net +191.3 -10 -570           Stool Occurrence 1 x 1 x           Lines/Drains/Airways     Peripherally Inserted Central Catheter Line                 PICC Double Lumen 03/05/18 1539 right basilic 97 days         PICC Double Lumen 06/04/18 1654 left brachial 6 days          Central Venous Catheter Line                 Percutaneous Central Line Insertion/Assessment - Quad lumen  05/25/18 0800 left subclavian 17 days          Drain                 Gastrostomy/Enterostomy 06/07/18 1657 Gastrostomy-jejunostomy LUQ 3 days          Line                 Pacer Wires 05/25/18 1355 16 days          Peripheral Intravenous Line                 Peripheral IV - Single Lumen 06/07/18 2236 Right Antecubital 3 days                Physical Exam   Constitutional: He is oriented to person, place, and time. He appears well-developed and well-nourished.   Cardiovascular: Normal rate, regular rhythm and normal heart sounds.    Pulmonary/Chest: Effort normal and breath sounds normal.   Abdominal: Soft.   PEG intact     Neurological: He is alert and oriented to person, place, and time.   Skin: Skin is warm, dry and intact.   Psychiatric: He has a normal mood and affect.       Significant Labs:  BMP:   Recent Labs  Lab 06/11/18  0433   *      K 3.6      CO2 30*   BUN 67*   CREATININE 1.8*   CALCIUM 8.9   MG 3.0*     CBC:   Recent Labs  Lab 06/11/18  0433   WBC 6.39   RBC 2.58*   HGB 7.9*   HCT 24.5*      MCV 95   MCH 30.6   MCHC 32.2     Coagulation:   Recent Labs  Lab 06/11/18  0433   INR 1.2   APTT 31.6

## 2018-06-11 NOTE — SUBJECTIVE & OBJECTIVE
"Interval HPI:   Overnight events: remains in CTSU. TF to goal of 50 cc/hr. Scheduled insulin stopped overnight when TPN stopped. Currently BG at goal while on TF.   Eating:   NPO  Nausea: No  Hypoglycemia and intervention: No  Fever: No  TF: Yes isosource 1.5 at 50 cc/hr; TPN suspended overnight.     BP (!) 93/53 (BP Location: Right arm, Patient Position: Sitting)   Pulse 67   Temp 98.3 °F (36.8 °C) (Oral)   Resp 18   Ht 5' 10" (1.778 m)   Wt 76.8 kg (169 lb 6.4 oz)   SpO2 (!) 92%   BMI 24.31 kg/m²     Labs Reviewed and Include      Recent Labs  Lab 06/11/18  0433   *   CALCIUM 8.9   ALBUMIN 2.2*   PROT 5.6*      K 3.6   CO2 30*      BUN 67*   CREATININE 1.8*   ALKPHOS 85   ALT 14   AST 19   BILITOT 0.6     Lab Results   Component Value Date    WBC 6.39 06/11/2018    HGB 7.9 (L) 06/11/2018    HCT 24.5 (L) 06/11/2018    MCV 95 06/11/2018     06/11/2018     No results for input(s): TSH, FREET4 in the last 168 hours.  Lab Results   Component Value Date    HGBA1C 5.4 02/22/2018       Nutritional status:   Body mass index is 24.31 kg/m².  Lab Results   Component Value Date    ALBUMIN 2.2 (L) 06/11/2018    ALBUMIN 2.3 (L) 06/10/2018    ALBUMIN 2.2 (L) 06/09/2018     Lab Results   Component Value Date    PREALBUMIN 20 06/06/2018    PREALBUMIN 12 (L) 02/22/2018       Estimated Creatinine Clearance: 41.1 mL/min (A) (based on SCr of 1.8 mg/dL (H)).    Accu-Checks  Recent Labs      06/09/18   1133  06/09/18   1527  06/09/18   2103  06/10/18   0005  06/10/18   0523  06/10/18   0742  06/10/18   1132  06/10/18   1634  06/10/18   1949  06/11/18   1129   POCTGLUCOSE  205*  164*  137*  110  166*  88  169*  138*  123*  136*         Hyperglycemia/Diabetes Medications: Antihyperglycemics     Start     Stop Route Frequency Ordered    05/30/18 1703  insulin aspart U-100 pen 0-5 Units      -- SubQ Every 4 hours PRN 05/30/18 1705        "

## 2018-06-11 NOTE — PROGRESS NOTES
PARTHA Lopez, NP notified patient's lackey removed at 0530 this Am. Patient feels need to urinate, but unable to void at this time. Bladder scan shows 420ml. Patient takes Flomax at home. NP to place orders for Flomax and TORB to continue to monitor output over the next hour and call MD if unable to void. Will continue to monitor.    1400 AJame Lopez NP notified patient voided 220 and has a history of bladder volume post void. NP orders to hold placement of lackey at this time. NP orders to continue with strict I&Os. May place lackey cath later pending output. Will continue to monitor.

## 2018-06-11 NOTE — SUBJECTIVE & OBJECTIVE
Past Medical History:   Diagnosis Date    ANNA (acute kidney injury) 2/22/2018    Anemia     Anxiety     Arthritis     BPH (benign prostatic hyperplasia)     CHF (congestive heart failure) 5/23/2018    Coronary artery disease of native artery of native heart with stable angina pectoris 5/21/2018    Diverticulosis     Encounter for blood transfusion     Hypertension     Urinary retention      Past Surgical History:   Procedure Laterality Date    APPENDECTOMY      COLONOSCOPY      COLONOSCOPY N/A 2/1/2018    Procedure: COLONOSCOPY;  Surgeon: Richar Payan MD;  Location: [facility] (4TH FLR);  Service: Endoscopy;  Laterality: N/A;  PM prep    CREATION OF AORTOCORONARY ARTERY BYPASS N/A 5/25/2018    Procedure: AORTOCORONARY BYPASS-CABG;  Surgeon: Marvin Go MD;  Location: [facility] OR 2ND FLR;  Service: Cardiovascular;  Laterality: N/A;    EYE SURGERY Right     cataract extraction    FINGER SURGERY      FRACTURE SURGERY Right     index finger    MITRAL VALVE REPLACEMENT N/A 5/25/2018    Procedure: Mitral Valve Replacement;  Surgeon: Marvin Go MD;  [facility];  Service: Cardiovascular;  Laterality: N/A;    TONSILLECTOMY       Review of patient's allergies indicates:   Allergen Reactions    Shellfish containing products     Augmentin [amoxicillin-pot clavulanate]      Patient felt that it raised BP and requested to have it added as intolerance. Tolerated unasyn and ampicillin.    Ciprofloxacin     Flagyl [metronidazole]     Lisinopril Other (See Comments)     cough    Mysoline [primidone]     Omnicef [cefdinir]        Scheduled Medications:    amiodarone  400 mg Per G Tube BID    amLODIPine  10 mg Per G Tube Daily    ampicillin IVPB  2 g Intravenous Q6H    aspirin  324 mg Per G Tube Daily    atorvastatin  40 mg Per G Tube Daily    cefTRIAXone (ROCEPHIN) IVPB  2 g Intravenous Q12H    doxazosin  4 mg Per G Tube Daily    famotidine  20 mg Per G Tube Daily     hydrALAZINE  25 mg Per G Tube Q8H    mirtazapine  15 mg Per G Tube QHS    polyethylene glycol  17 g Per G Tube Daily    ramelteon  8 mg Per G Tube QHS    sodium chloride 0.9%  10 mL Intravenous Q6H    sodium chloride 0.9%  3 mL Intravenous Q8H    warfarin  5 mg Per G Tube Daily       PRN Medications: sodium chloride, albumin human 5%, albuterol-ipratropium, bisacodyl, dextrose 50%, docusate, glucagon (human recombinant), hydrALAZINE, insulin aspart U-100, lactated ringers, metoclopramide HCl, morphine, ondansetron, oxyCODONE, Flushing PICC Protocol **AND** sodium chloride 0.9% **AND** sodium chloride 0.9%, white petrolatum-mineral oil    Family History     Problem Relation (Age of Onset)    Heart disease Mother, Father    No Known Problems Sister, Brother    Stroke Mother        Social History Main Topics    Smoking status: Never Smoker    Smokeless tobacco: Never Used    Alcohol use No      Comment: none recently    Drug use: No    Sexual activity: Not Currently     Partners: Female     Review of Systems   Constitutional: Positive for fatigue. Negative for chills and fever.   HENT: Positive for trouble swallowing. Negative for voice change.    Eyes: Negative for photophobia and visual disturbance.   Respiratory: Positive for shortness of breath. Negative for cough and wheezing.    Cardiovascular: Negative for chest pain and palpitations.   Gastrointestinal: Positive for constipation. Negative for abdominal distention, nausea and vomiting.   Genitourinary: Negative for difficulty urinating and flank pain.   Musculoskeletal: Positive for gait problem. Negative for arthralgias and myalgias.   Skin: Negative for color change and rash.   Neurological: Positive for weakness. Negative for speech difficulty, numbness and headaches.   Psychiatric/Behavioral: Negative for agitation and confusion.     Objective:     Vital Signs (Most Recent):  Temp: 98.3 °F (36.8 °C) (06/11/18 1120)  Pulse: 67 (06/11/18  1120)  Resp: 18 (06/11/18 1120)  BP: (!) 93/53 (06/11/18 1120)  SpO2: (!) 92 % (06/11/18 1120)    Vital Signs (24h Range):  Temp:  [97.4 °F (36.3 °C)-98.4 °F (36.9 °C)] 98.3 °F (36.8 °C)  Pulse:  [62-70] 67  Resp:  [16-20] 18  SpO2:  [86 %-96 %] 92 %  BP: ()/(52-60) 93/53     Body mass index is 24.31 kg/m².    Physical Exam   Constitutional: He is oriented to person, place, and time. He appears well-developed.   Thin  Wife at bedside   HENT:   Head: Normocephalic and atraumatic.   Eyes: EOM are normal. Pupils are equal, round, and reactive to light.   Neck: Neck supple.   Cardiovascular: Normal rate and regular rhythm.    Pulmonary/Chest: Effort normal. No respiratory distress.   Abdominal: Soft. There is no tenderness.   JG tube noted   Musculoskeletal: Normal range of motion. He exhibits no deformity.   Neurological: He is alert and oriented to person, place, and time. No sensory deficit. He exhibits normal muscle tone.   -no focal weakness   Skin: Skin is warm and dry.   Psychiatric: He has a normal mood and affect. His behavior is normal.     NEUROLOGICAL EXAMINATION:     MENTAL STATUS   Oriented to person, place, and time.     CRANIAL NERVES     CN III, IV, VI   Pupils are equal, round, and reactive to light.  Extraocular motions are normal.       Diagnostic Results: Labs: Reviewed

## 2018-06-11 NOTE — PLAN OF CARE
Problem: Patient Care Overview  Goal: Plan of Care Review  Outcome: Ongoing (interventions implemented as appropriate)  Plan of care discussed with patient, no new questions at this time. VSS, denies SOB, no complaint of pain. Heparin gtt infusing/D5 @50 infusing. TPN discontinued. Tube feeding now infusing in J tube. Infusing at goal rate, 50cc/hr. No residuals. Safety precautions taken, no falls/trauma during the shift. Will continue to monitor.

## 2018-06-11 NOTE — SUBJECTIVE & OBJECTIVE
Interval History: No acute events overnight. Doing well, continue to work with PT.    Medications:  Continuous Infusions:   dextrose 5 % 50 mL/hr at 06/10/18 1701    heparin (porcine) in 5 % dex 500 Units/hr (06/09/18 1347)     Scheduled Meds:   amiodarone  400 mg Per G Tube BID    amLODIPine  10 mg Per G Tube Daily    ampicillin IVPB  2 g Intravenous Q6H    aspirin  324 mg Per G Tube Daily    atorvastatin  40 mg Per G Tube Daily    cefTRIAXone (ROCEPHIN) IVPB  2 g Intravenous Q12H    doxazosin  4 mg Per G Tube Daily    famotidine  20 mg Per G Tube Daily    hydrALAZINE  25 mg Per G Tube Q8H    mirtazapine  15 mg Per G Tube QHS    polyethylene glycol  17 g Per G Tube Daily    ramelteon  8 mg Per G Tube QHS    sodium chloride 0.9%  10 mL Intravenous Q6H    sodium chloride 0.9%  3 mL Intravenous Q8H    warfarin  3 mg Per G Tube Daily     PRN Meds:sodium chloride, albumin human 5%, albuterol-ipratropium, bisacodyl, dextrose 50%, docusate, glucagon (human recombinant), hydrALAZINE, insulin aspart U-100, lactated ringers, metoclopramide HCl, morphine, ondansetron, oxyCODONE, Flushing PICC Protocol **AND** sodium chloride 0.9% **AND** sodium chloride 0.9%, white petrolatum-mineral oil     Objective:     Vital Signs (Most Recent):  Temp: 97.4 °F (36.3 °C) (06/10/18 1945)  Pulse: 64 (06/10/18 2028)  Resp: 17 (06/10/18 1945)  BP: (!) 117/56 (06/10/18 1945)  SpO2: (!) 92 % (06/10/18 1945) Vital Signs (24h Range):  Temp:  [96.3 °F (35.7 °C)-98.2 °F (36.8 °C)] 97.4 °F (36.3 °C)  Pulse:  [64-76] 64  Resp:  [17-20] 17  SpO2:  [91 %-94 %] 92 %  BP: (106-136)/(52-62) 117/56     Weight: 76.5 kg (168 lb 11.2 oz)  Body mass index is 24.21 kg/m².    SpO2: (!) 92 %  O2 Device (Oxygen Therapy): nasal cannula    Intake/Output - Last 3 Shifts       06/08 0700 - 06/09 0659 06/09 0700 - 06/10 0659 06/10 0700 - 06/11 0659    P.O. 0 0 0    I.V. (mL/kg) 948.5 (12.6) 440 (5.8)     Blood 377 313.3     NG/GT  248 490    IV  Piggyback 550 250 250     400 400    Total Intake(mL/kg) 2490.5 (33.1) 1651.3 (21.6) 1140 (14.9)    Urine (mL/kg/hr) 1000 (0.6) 1250 (0.7) 700 (0.6)    Drains  210 (0.1)     Total Output 1000 1460 700    Net +1490.5 +191.3 +440           Stool Occurrence 0 x 1 x 1 x          Lines/Drains/Airways     Peripherally Inserted Central Catheter Line                 PICC Double Lumen 03/05/18 1539 right basilic 97 days         PICC Double Lumen 06/04/18 1654 left brachial 6 days          Central Venous Catheter Line                 Percutaneous Central Line Insertion/Assessment - Quad lumen  05/25/18 0800 left subclavian 16 days          Drain                 Urethral Catheter 06/06/18 0030 4 days         Gastrostomy/Enterostomy 06/07/18 1657 Gastrostomy-jejunostomy LUQ 3 days          Line                 Pacer Wires 05/25/18 1355 16 days          Peripheral Intravenous Line                 Peripheral IV - Single Lumen 06/07/18 2236 Right Antecubital 3 days                Physical Exam   Constitutional: He is oriented to person, place, and time. He appears cachectic.   HENT:   Head: Normocephalic and atraumatic.   Eyes: Pupils are equal, round, and reactive to light.   Neck: Normal range of motion. Neck supple.   Cardiovascular: Normal rate and regular rhythm.    Incision c/d/i   Pulmonary/Chest: Effort normal.   Abdominal: Soft. Bowel sounds are normal.   Musculoskeletal: Normal range of motion.   Neurological: He is alert and oriented to person, place, and time.   Skin: Skin is warm and dry.   Psychiatric: He has a normal mood and affect. His behavior is normal.       Significant Labs:  CBC:   Recent Labs  Lab 06/10/18  0543   WBC 7.55   RBC 2.63*   HGB 8.2*   HCT 24.6*      MCV 94   MCH 31.2*   MCHC 33.3     CMP:   Recent Labs  Lab 06/10/18  0543 06/10/18  1533   *  --    CALCIUM 8.9  --    ALBUMIN 2.3*  --    PROT 5.8*  --      --    K 3.6 4.1   CO2 30*  --      --    BUN 63*  --     CREATININE 1.7*  --    ALKPHOS 82  --    ALT 14  --    AST 15  --    BILITOT 0.6  --      Coagulation:   Recent Labs  Lab 06/10/18  0543  06/10/18  1837   INR 1.2  --   --    APTT 29.2  < > 32.0   < > = values in this interval not displayed.

## 2018-06-12 LAB
ALBUMIN SERPL BCP-MCNC: 2.2 G/DL
ALP SERPL-CCNC: 84 U/L
ALT SERPL W/O P-5'-P-CCNC: 13 U/L
ANION GAP SERPL CALC-SCNC: 9 MMOL/L
APTT BLDCRRT: 32.5 SEC
APTT BLDCRRT: 32.9 SEC
APTT BLDCRRT: 34.2 SEC
APTT BLDCRRT: 34.2 SEC
AST SERPL-CCNC: 22 U/L
BASOPHILS # BLD AUTO: 0 K/UL
BASOPHILS NFR BLD: 0 %
BILIRUB SERPL-MCNC: 0.6 MG/DL
BUN SERPL-MCNC: 61 MG/DL
CALCIUM SERPL-MCNC: 9 MG/DL
CHLORIDE SERPL-SCNC: 103 MMOL/L
CO2 SERPL-SCNC: 35 MMOL/L
CREAT SERPL-MCNC: 1.9 MG/DL
DIFFERENTIAL METHOD: ABNORMAL
EOSINOPHIL # BLD AUTO: 0.2 K/UL
EOSINOPHIL NFR BLD: 2.7 %
ERYTHROCYTE [DISTWIDTH] IN BLOOD BY AUTOMATED COUNT: 16.6 %
EST. GFR  (AFRICAN AMERICAN): 41.3 ML/MIN/1.73 M^2
EST. GFR  (NON AFRICAN AMERICAN): 35.7 ML/MIN/1.73 M^2
GLUCOSE SERPL-MCNC: 135 MG/DL
HCT VFR BLD AUTO: 23.3 %
HGB BLD-MCNC: 7.4 G/DL
IMM GRANULOCYTES # BLD AUTO: 0.03 K/UL
IMM GRANULOCYTES NFR BLD AUTO: 0.5 %
INR PPP: 1.4
INR PPP: 1.6
LYMPHOCYTES # BLD AUTO: 0.5 K/UL
LYMPHOCYTES NFR BLD: 7.9 %
MAGNESIUM SERPL-MCNC: 2.9 MG/DL
MCH RBC QN AUTO: 30.6 PG
MCHC RBC AUTO-ENTMCNC: 31.8 G/DL
MCV RBC AUTO: 96 FL
MONOCYTES # BLD AUTO: 0.4 K/UL
MONOCYTES NFR BLD: 7.4 %
NEUTROPHILS # BLD AUTO: 4.8 K/UL
NEUTROPHILS NFR BLD: 81.5 %
NRBC BLD-RTO: 0 /100 WBC
PHOSPHATE SERPL-MCNC: 4 MG/DL
PLATELET # BLD AUTO: 166 K/UL
PMV BLD AUTO: 12.3 FL
POCT GLUCOSE: 146 MG/DL (ref 70–110)
POCT GLUCOSE: 221 MG/DL (ref 70–110)
POCT GLUCOSE: 254 MG/DL (ref 70–110)
POTASSIUM SERPL-SCNC: 3.5 MMOL/L
PROT SERPL-MCNC: 5.7 G/DL
PROTHROMBIN TIME: 14.3 SEC
PROTHROMBIN TIME: 15.7 SEC
RBC # BLD AUTO: 2.42 M/UL
SODIUM SERPL-SCNC: 147 MMOL/L
WBC # BLD AUTO: 5.94 K/UL

## 2018-06-12 PROCEDURE — 25000003 PHARM REV CODE 250: Performed by: PHYSICIAN ASSISTANT

## 2018-06-12 PROCEDURE — 25000003 PHARM REV CODE 250: Performed by: STUDENT IN AN ORGANIZED HEALTH CARE EDUCATION/TRAINING PROGRAM

## 2018-06-12 PROCEDURE — 85610 PROTHROMBIN TIME: CPT

## 2018-06-12 PROCEDURE — 97530 THERAPEUTIC ACTIVITIES: CPT

## 2018-06-12 PROCEDURE — 97535 SELF CARE MNGMENT TRAINING: CPT

## 2018-06-12 PROCEDURE — 25000003 PHARM REV CODE 250: Performed by: THORACIC SURGERY (CARDIOTHORACIC VASCULAR SURGERY)

## 2018-06-12 PROCEDURE — G8978 MOBILITY CURRENT STATUS: HCPCS | Mod: CK

## 2018-06-12 PROCEDURE — 97116 GAIT TRAINING THERAPY: CPT

## 2018-06-12 PROCEDURE — 20600001 HC STEP DOWN PRIVATE ROOM

## 2018-06-12 PROCEDURE — P9045 ALBUMIN (HUMAN), 5%, 250 ML: HCPCS | Mod: JG | Performed by: NURSE PRACTITIONER

## 2018-06-12 PROCEDURE — A4216 STERILE WATER/SALINE, 10 ML: HCPCS | Performed by: THORACIC SURGERY (CARDIOTHORACIC VASCULAR SURGERY)

## 2018-06-12 PROCEDURE — 36415 COLL VENOUS BLD VENIPUNCTURE: CPT

## 2018-06-12 PROCEDURE — 85025 COMPLETE CBC W/AUTO DIFF WBC: CPT

## 2018-06-12 PROCEDURE — 83735 ASSAY OF MAGNESIUM: CPT

## 2018-06-12 PROCEDURE — 80053 COMPREHEN METABOLIC PANEL: CPT

## 2018-06-12 PROCEDURE — 84100 ASSAY OF PHOSPHORUS: CPT

## 2018-06-12 PROCEDURE — 25000003 PHARM REV CODE 250: Performed by: NURSE PRACTITIONER

## 2018-06-12 PROCEDURE — 85730 THROMBOPLASTIN TIME PARTIAL: CPT | Mod: 91

## 2018-06-12 PROCEDURE — 63600175 PHARM REV CODE 636 W HCPCS: Performed by: PHYSICIAN ASSISTANT

## 2018-06-12 PROCEDURE — 85730 THROMBOPLASTIN TIME PARTIAL: CPT

## 2018-06-12 PROCEDURE — 85610 PROTHROMBIN TIME: CPT | Mod: 91

## 2018-06-12 PROCEDURE — 63600175 PHARM REV CODE 636 W HCPCS: Performed by: STUDENT IN AN ORGANIZED HEALTH CARE EDUCATION/TRAINING PROGRAM

## 2018-06-12 PROCEDURE — 63600175 PHARM REV CODE 636 W HCPCS: Mod: JG | Performed by: NURSE PRACTITIONER

## 2018-06-12 RX ORDER — WARFARIN SODIUM 5 MG/1
5 TABLET ORAL DAILY
Status: DISCONTINUED | OUTPATIENT
Start: 2018-06-12 | End: 2018-06-12

## 2018-06-12 RX ORDER — WARFARIN SODIUM 5 MG/1
5 TABLET ORAL DAILY
Status: DISCONTINUED | OUTPATIENT
Start: 2018-06-12 | End: 2018-06-14

## 2018-06-12 RX ORDER — GUAIFENESIN/DEXTROMETHORPHAN 100-10MG/5
5 SYRUP ORAL EVERY 4 HOURS PRN
Status: DISCONTINUED | OUTPATIENT
Start: 2018-06-12 | End: 2018-06-20 | Stop reason: HOSPADM

## 2018-06-12 RX ORDER — ALBUMIN HUMAN 50 G/1000ML
25 SOLUTION INTRAVENOUS ONCE
Status: COMPLETED | OUTPATIENT
Start: 2018-06-12 | End: 2018-06-12

## 2018-06-12 RX ORDER — OXYMETAZOLINE HCL 0.05 %
2 SPRAY, NON-AEROSOL (ML) NASAL 2 TIMES DAILY
Status: DISCONTINUED | OUTPATIENT
Start: 2018-06-12 | End: 2018-06-15

## 2018-06-12 RX ADMIN — MIRTAZAPINE 15 MG: 15 TABLET, FILM COATED ORAL at 09:06

## 2018-06-12 RX ADMIN — ATORVASTATIN CALCIUM 40 MG: 20 TABLET, FILM COATED ORAL at 09:06

## 2018-06-12 RX ADMIN — AMPICILLIN 2 G: 2 INJECTION, POWDER, FOR SOLUTION INTRAVENOUS at 11:06

## 2018-06-12 RX ADMIN — GUAIFENESIN AND DEXTROMETHORPHAN 5 ML: 100; 10 SYRUP ORAL at 05:06

## 2018-06-12 RX ADMIN — DOXAZOSIN MESYLATE 4 MG: 2 TABLET ORAL at 09:06

## 2018-06-12 RX ADMIN — OXYCODONE HYDROCHLORIDE 5 MG: 5 TABLET ORAL at 11:06

## 2018-06-12 RX ADMIN — FAMOTIDINE 20 MG: 20 TABLET ORAL at 09:06

## 2018-06-12 RX ADMIN — RAMELTEON 8 MG: 8 TABLET, FILM COATED ORAL at 09:06

## 2018-06-12 RX ADMIN — AMPICILLIN 2 G: 2 INJECTION, POWDER, FOR SOLUTION INTRAVENOUS at 05:06

## 2018-06-12 RX ADMIN — CEFTRIAXONE SODIUM 2 G: 2 INJECTION, POWDER, FOR SOLUTION INTRAMUSCULAR; INTRAVENOUS at 11:06

## 2018-06-12 RX ADMIN — HYDRALAZINE HYDROCHLORIDE 25 MG: 25 TABLET, FILM COATED ORAL at 05:06

## 2018-06-12 RX ADMIN — OXYMETAZOLINE HYDROCHLORIDE 2 SPRAY: 5 SPRAY NASAL at 09:06

## 2018-06-12 RX ADMIN — AMIODARONE HYDROCHLORIDE 400 MG: 200 TABLET ORAL at 09:06

## 2018-06-12 RX ADMIN — AMPICILLIN 2 G: 2 INJECTION, POWDER, FOR SOLUTION INTRAVENOUS at 04:06

## 2018-06-12 RX ADMIN — ASPIRIN 81 MG CHEWABLE TABLET 324 MG: 81 TABLET CHEWABLE at 09:06

## 2018-06-12 RX ADMIN — WARFARIN SODIUM 5 MG: 5 TABLET ORAL at 03:06

## 2018-06-12 RX ADMIN — DEXTROSE: 5 SOLUTION INTRAVENOUS at 09:06

## 2018-06-12 RX ADMIN — HYDRALAZINE HYDROCHLORIDE 25 MG: 25 TABLET, FILM COATED ORAL at 02:06

## 2018-06-12 RX ADMIN — Medication 3 ML: at 02:06

## 2018-06-12 RX ADMIN — INSULIN ASPART 3 UNITS: 100 INJECTION, SOLUTION INTRAVENOUS; SUBCUTANEOUS at 05:06

## 2018-06-12 RX ADMIN — ALBUMIN HUMAN 25 G: 0.05 INJECTION, SOLUTION INTRAVENOUS at 12:06

## 2018-06-12 RX ADMIN — OXYMETAZOLINE HYDROCHLORIDE 2 SPRAY: 5 SPRAY NASAL at 12:06

## 2018-06-12 RX ADMIN — HYDRALAZINE HYDROCHLORIDE 25 MG: 25 TABLET, FILM COATED ORAL at 09:06

## 2018-06-12 RX ADMIN — GUAIFENESIN AND DEXTROMETHORPHAN 5 ML: 100; 10 SYRUP ORAL at 09:06

## 2018-06-12 RX ADMIN — AMLODIPINE BESYLATE 10 MG: 10 TABLET ORAL at 09:06

## 2018-06-12 NOTE — PLAN OF CARE
Problem: Patient Care Overview  Goal: Plan of Care Review     Recommendations     Recommendation/Intervention:   1. Continue current TF regimen of Isosource 1.5 at a goal rate of 50 mL/hr + 2 packets/day Beneprotein - to provide 1850 kcal/day, 94 g protein/day, and 917 mL free fluid/day.  2. As medically able, ADAT to Cardiac with texture per SLP.  3. RD to monitor.     Goals: Pt to receive nutrition by RD follow up  Nutrition Goal Status: goal met

## 2018-06-12 NOTE — PLAN OF CARE
Problem: Patient Care Overview  Goal: Plan of Care Review  Outcome: Ongoing (interventions implemented as appropriate)  Discussed Plan of Care with patient. VS stable. Ambulated in room, to bathroom, x2. Ambulated In hallway x1. Fall precautions in place. Sternal precautions reviewed. Bleeding precautions reviewed and maintained. Pain denied. ENT consulted. Dextrose, Ceftriaxone and Ampicillin continued. Heparin gtt continued at non-titrating dose. PTTs q6hrs. Blood glucose monitored q6hrs. Attempted to wean oxygen. Patient seen by Speech and has slight improvement. G tube to gravity, draining. Tube feeds continued to J tube. NPO, all ,eds crushed through tube. Lainez placed for urinary retention. Patient remaine free of falls/ injury. All questions and concerns were addressed. Will continue to monitor patient.

## 2018-06-12 NOTE — PT/OT/SLP PROGRESS
Occupational Therapy   Treatment    Name: Rodolfo Messina  MRN: [id number]  Admitting Diagnosis:  S/P MVR (mitral valve replacement)  18 Days Post-Op    Recommendations:     Discharge Recommendations: rehabilitation facility  Discharge Equipment Recommendations:  none  Barriers to discharge:  None    Subjective     Communicated with: RN prior to session. Pt agreeable to OT; reported discomfort from nosebleed.  Pain/Comfort:  · Pain Rating 1:  (Did not rate)  · Location 1:  (PEG tube site)  · Pain Addressed 1: Reposition, Distraction    Patients cultural, spiritual, Samaritan conflicts given the current situation: None    Objective:     Patient found with: peripheral IV, lackey catheter, telemetry, oxygen (mask due to nosebleed), PEG Tube    General Precautions: Standard, fall, sternal, aspiration, NPO   Orthopedic Precautions:N/A   Braces: N/A     Occupational Performance:    Bed Mobility:    · NT, pt sitting up in chair    Functional Mobility/Transfers:  · Patient completed Sit <> Stand Transfer with CGA-Min A from bedside chair and toilet with no assistive device; demo good understanding of sternal precautions; initial stand from bedside chair pt was dizzy and returned to sitting   · Functional Mobility: Within room to sink and toilet household distance with CGA no AD; O2 93% on mask, cues for breathing technique    Activities of Daily Living:  · Grooming: contact guard assistance standing at sink to wash face and then to wash hands following toileting  · UB Dressing: minimum assistance to don gown around back/lines  · Toileting: contact guard assistance for balance and clothing management    Patient left up in chair with all lines intact, call button in reach and family present    [facility] 6 Click:  [facility] Total Score: 19    Treatment & Education:  Pt completed functional mobility within room for standing ADLs and toileting; reviewed HEP to increase strength and endurance  Education:      Assessment:     Rodolfo Rodríguez  Mayuri is a 67 y.o. male with a medical diagnosis of S/P MVR (mitral valve replacement).  He presents with performance deficits including weakness, impaired endurance, impaired self care skills, impaired functional mobilty, gait instability, impaired balance, impaired cardiopulmonary response to activity. Pt progressing toward goals and would continue to benefit from OT to maximize functional independence. Recommend rehab upon D/C.     Rehab Prognosis:  Good; patient would benefit from acute skilled OT services to address these deficits and reach maximum level of function.       Plan:     Patient to be seen 5 x/week to address the above listed problems via self-care/home management, therapeutic activities, therapeutic exercises  · Plan of Care Expires: 06/30/18  · Plan of Care Reviewed with: patient, family    This Plan of care has been discussed with the patient who was involved in its development and understands and is in agreement with the identified goals and treatment plan    GOALS:    Occupational Therapy Goals        Problem: Occupational Therapy Goal    Goal Priority Disciplines Outcome Interventions   Occupational Therapy Goal     OT, PT/OT Ongoing (interventions implemented as appropriate)    Description:  Updated Goals to be met by: 6/19/18     Patient will increase functional independence with ADLs by performing:    UE Dressing with Supervision.  LE Dressing with Supervision.  Grooming while standing at sink with Supervision.  Toileting from toilet with Supervision for hygiene and clothing management.   Toilet transfer to toilet with Supervision.  Functional mobility household distance with SBA.    Goals to be met by: 6/10/18     Patient will increase functional independence with ADLs by performing:    Feeding with Modified [place].  UE Dressing with Supervision.  LE Dressing with Supervision.  Grooming while standing at sink with Supervision.  Toileting from toilet with Supervision for hygiene and  clothing management.   Toilet transfer to toilet with Supervision.                        Time Tracking:     OT Date of Treatment: 06/12/18  OT Start Time: 1038  OT Stop Time: 1106  OT Total Time (min): 28 min    Billable Minutes:Self Care/Home Management 15  Therapeutic Activity 13    MILTON Jasso  6/12/2018

## 2018-06-12 NOTE — PROGRESS NOTES
[facility]  Cardiothoracic Surgery  Progress Note    Patient Name: Rodolfo Messina  MRN: [id number]  Admission Date: 5/23/2018  Hospital Length of Stay: 20 days  Code Status: Full Code   Attending Physician: Marvin Go MD   Referring Provider: Marvin Go MD  Principal Problem:S/P MVR (mitral valve replacement)    Subjective:     Post-Op Info:  Procedure(s) (LRB):  Mitral Valve Replacement (N/A)  AORTOCORONARY BYPASS-CABG (N/A)   18 Days Post-Op     No new subjective & objective note has been filed under this hospital service since the last note was generated.    Assessment/Plan:     * S/P MVR (mitral valve replacement)    --Continue ASA, statin (holding BB)  --Sternal precautions  --PT/OT  --Ambulate x4   --Wean O2 as tolerated for O2 sat >92%  --monitor CXR    --Monitor electrolytes and replace prn  --Coumadin 5mg today will repeat INR this afternoon  --D5W @50  -- Q6  --continue  lackey   --strict I&Os  --did not pass modified swallow; PICC and TPN  --G-J tube placed; increase TF to goal        DISPO: discharge planning on going for rehab           S/P CABG x 1    --see MVR         Dysphagia    --SLP following  --failed last BMS study   --GJ tube placed by IR 6/7  --initiate TF 10ml/hr        Impaired functional mobility and endurance    --discharging to rehab  --PT/OT daily         On enteral nutrition    --continue TPN with lipids until GJ tube placed- transitioning off, dose halfed         Epistaxis    --Noted to have post traumatic left sided epistaxis following attempted NGT placement. On the time of evaluation patient was hemostatic. No active bleeding noted.     -Given that patient is hemostatic, no acute intervention   -Recommend minimize nasal trauma as best possible  -If requires oxygen, ideal if it is humidified via face tent and not nasal canula  -Bacitracin to bilateral nares nightly  -Afrin q6hrs PRN bleeding (only if ok from Cards prospective)  -Ocean nasal spray  q6hrs while awake (starting in a couple days, allow stable clot to form for now)  -If bleeding restarts, hold pressure to fleshy portion of nose (10 mins) and lean forward  -For bleeding that does not stop, page resident  -Remainder of medical management per primary team        Atrial flutter    --NSR currently   --holding BB        Chronic systolic congestive heart failure    - ASA, statin  - D/c beta blocker  - EP following; appreciate recs          ANNA (acute kidney injury)    --Elevated Cr  --holding Stuart Lopez NP  Cardiothoracic Surgery  [facility]

## 2018-06-12 NOTE — PLAN OF CARE
[facility] is following for medical stability.  The pt will be ready in the next few days.  SW will f/u as needed.    Radha Montes, NELLY x [phone]

## 2018-06-12 NOTE — PLAN OF CARE
Problem: Occupational Therapy Goal  Goal: Occupational Therapy Goal  Updated Goals to be met by: 6/19/18     Patient will increase functional independence with ADLs by performing:    UE Dressing with Supervision.  LE Dressing with Supervision.  Grooming while standing at sink with Supervision.  Toileting from toilet with Supervision for hygiene and clothing management.   Toilet transfer to toilet with Supervision.  Functional mobility household distance with SBA.    Goals to be met by: 6/10/18     Patient will increase functional independence with ADLs by performing:    Feeding with Modified [place].  UE Dressing with Supervision.  LE Dressing with Supervision.  Grooming while standing at sink with Supervision.  Toileting from toilet with Supervision for hygiene and clothing management.   Toilet transfer to toilet with Supervision.      Outcome: Ongoing (interventions implemented as appropriate)  Continue OT plan of care.

## 2018-06-12 NOTE — ASSESSMENT & PLAN NOTE
--Continue ASA, statin (holding BB)  --Sternal precautions  --PT/OT  --Ambulate x4   --Wean O2 as tolerated for O2 sat >92%  --monitor CXR    --Monitor electrolytes and replace prn  --Coumadin 5mg today will repeat INR this afternoon  --D5W @50  -- Q6  --continue  lackey   --strict I&Os  --did not pass modified swallow; PICC and TPN  --G-J tube placed; increase TF to goal        DISPO: discharge planning on going for rehab

## 2018-06-12 NOTE — PROGRESS NOTES
"  [facility]  Adult Nutrition  Progress Note    SUMMARY     Recommendations    Recommendation/Intervention:   1. Continue current TF regimen of Isosource 1.5 at a goal rate of 50 mL/hr + 2 packets/day Beneprotein - to provide 1850 kcal/day, 94 g protein/day, and 917 mL free fluid/day.  2. As medically able, ADAT to Cardiac with texture per SLP.  3. RD to monitor.    Goals: Pt to receive nutrition by RD follow up  Nutrition Goal Status: goal met  Communication of RD Recs:  (POC)    Reason for Assessment    Reason for Assessment: RD follow-up  Diagnosis: cardiac disease (s/p MVR & CABG 5/25)  Relevant Medical History: mitral valve endocarditis, severe MR, pulmonary HTN, CHF, diverticulosis    General Information Comments: Pt remains NPO status. Tube feed - Isosource 1.5 running at rate of 50cc/hr at visit. Pt stated he was doing well and tolerating TF with no discomfort.    Nutrition Discharge Planning: Unable to determine at this time.    Nutrition Risk Screen    Nutrition Risk Screen: no indicators present    Nutrition/Diet History    Do you have any cultural, spiritual, Presybeterian conflicts, given your current situation?: None  Factors Affecting Nutritional Intake: NPO, difficulty/impaired swallowing    Anthropometrics    Temp: 97.2 °F (36.2 °C)  Height Method: Stated  Height: 5' 10" (177.8 cm)  Height (inches): 70 in  Weight Method: Bed Scale  Weight: 76.4 kg (168 lb 6.9 oz)  Weight (lb): 168.43 lb  Ideal Body Weight (IBW), Male: 166 lb  % Ideal Body Weight, Male (lb): 101.46 lb  BMI (Calculated): 24.2  BMI Grade: 18.5-24.9 - normal     Lab/Procedures/Meds    Pertinent Labs Reviewed: reviewed  Pertinent Labs Comments: Na 147, BUN 61, Crt 1.9, GFR 35.7, Glu 135, Mg 2.9, Tot. PRO 57, Alb 2.2, POCT Glu 146  Pertinent Medications Reviewed: reviewed  Pertinent Medications Comments: Famotidine, Warfarin, Polyethylene glycol, statin    Physical Findings/Assessment    Overall Physical Appearance: " nourished  Tubes:  (none)  Oral/Mouth Cavity: tooth/teeth missing  Skin: incision(s)    Estimated/Assessed Needs    Weight Used For Calorie Calculations: 65.4 kg (144 lb 2.9 oz)  Energy Calorie Requirements (kcal): 1794 kcal/day  Energy Need Method: [place]-St Jeor (x1.25 PAL)  Protein Requirements: 79-92 g/d (1.2-1.4 g/kg)  Weight Used For Protein Calculations: 65.4 kg (144 lb 2.9 oz)  Fluid Requirements (mL): 1 mL/kcal or per MD  Fluid Need Method: RDA Method  RDA Method (mL): 1794    Nutrition Prescription Ordered    Current Diet Order: NPO  Current Nutrition Support Formula Ordered: Isosource 1.5  Current Nutrition Support Rate Ordered: 50 (ml)  Current Nutrition Support Frequency Ordered: mL/hr  Oral Nutrition Supplement: Beneprotein 2x daily    Evaluation of Received Nutrient/Fluid Intake    Enteral Calories (kcal): 1800  Enteral Protein (gm): 82  Enteral (Free Water) Fluid (mL): 917  Other Calories (kcal): 50  Total Calories (kcal): 1850  % Kcal Needs: 103  Other Protein (gm): 12  Total Protein (gm): 94  % Protein Needs: 102  I/O: +127  Energy Calories Required: meeting needs  Protein Required: meeting needs  Fluid Required:  (per MD)  Comments: LBM 6/10  Tolerance: tolerating  % Intake of Estimated Energy Needs: 100%   %Meal Intake: NPO    Nutrition Risk  Level of Risk/Frequency of Follow-up: moderate (1x/week)     Assessment and Plan    * S/P MVR (mitral valve replacement)    Contributing Nutrition Diagnosis  Inadequate energy intake    Related to (etiology):   Decreased ability to consume sufficient energy    Signs and Symptoms (as evidenced by):   NPO and no alternative means of nutrition at this time     Nutrition Diagnosis Status:   Resolved         Monitor and Evaluation    Food and Nutrient Intake: energy intake, food and beverage intake, parenteral nutrition intake  Food and Nutrient Adminstration: diet order, enteral and parenteral nutrition administration  Anthropometric Measurements: weight,  weight change, body mass index  Biochemical Data, Medical Tests and Procedures: electrolyte and renal panel, gastrointestinal profile, inflammatory profile  Nutrition-Focused Physical Findings: overall appearance     Nutrition Follow-Up    RD Follow-up?: Yes      Wendy Hayes, dietetic intern      I certify that I directed the dietetic intern in service delivery and guided them using my skilled judgment. As the cosigning dietitian, I have reviewed the dietetic interns documentation and am responsible for the treatment, assessment, and plan.    Delmy Allen RD/JANICEN

## 2018-06-12 NOTE — PLAN OF CARE
Blood glucose remains at goal with no insulin requirements. TF to goal rate.    Okay to discontinue POC glucose checks and PRN insulin. Monitor glucose with AM labs.    Endocrine will sign off. Please reconsult PRN.

## 2018-06-12 NOTE — PLAN OF CARE
Plan of care reviewed with pt and grandson. VS stable. No acute events at this time. Pt c/o cough, PRN orders received for cough meds. TF maintained @50/hr, no residuals. IV antibiotics administered as ordered. Heparin and D5W gtt maintained. G tube to gravity for drainage 1hr after med administration. Lainez draining properly. Pt remains free from falls or injury. Bed low and locked, call light and personal belongings within reach. Will continue to monitor. Carin Osman RN

## 2018-06-12 NOTE — PLAN OF CARE
Problem: Physical Therapy Goal  Goal: Physical Therapy Goal  Goals to be met by: 6/15/2018    Patient will increase functional independence with mobility by performin. Supine to sit with Supervision - not met  2. Sit to stand transfer with Supervision - not met  3. Bed to chair transfer with Supervision  - not met  4. Gait  x 250 feet with Supervision - not met  5. Lower extremity exercise program x20 reps per handout, with independence - met        Outcome: Ongoing (interventions implemented as appropriate)  LTGs remain appropriate. Pt will continue PT POC.    Chad Mckeon, MICHAEL  2018

## 2018-06-12 NOTE — PT/OT/SLP PROGRESS
Physical Therapy Treatment    Patient Name:  Rodolfo Messina   MRN:  [id number]    Recommendations:     Discharge Recommendations:  nursing facility, skilled   Discharge Equipment Recommendations: none   Barriers to discharge: None    Assessment:     Rodolfo Messina is a 67 y.o. male admitted with a medical diagnosis of S/P MVR (mitral valve replacement).  He presents with the following impairments/functional limitations:  weakness, impaired endurance, impaired self care skills, impaired functional mobilty, gait instability, impaired balance, impaired cardiopulmonary response to activity. Pt is progressing appropriately as indicated by increased ambulation distance. Pt requires frequent verbal reminders during ambulation to inform SPT when he is feeling fatigued and needs to take a seated rest break. Pt remains appropriate for acute PT services.    Rehab Prognosis:  Good; patient would benefit from acute skilled PT services to address these deficits and reach maximum level of function.      Recent Surgery: Procedure(s) (LRB):  Mitral Valve Replacement (N/A)  AORTOCORONARY BYPASS-CABG (N/A) 18 Days Post-Op    Plan:     During this hospitalization, patient to be seen 5 x/week to address the above listed problems via gait training, therapeutic activities, therapeutic exercises, neuromuscular re-education  · Plan of Care Expires:  06/25/18   Plan of Care Reviewed with: patient, spouse    Subjective     Communicated with RN prior to session.  Patient found sitting UIC upon PT entry to room, agreeable to treatment. Pt's wife was present t/o PT session.    Chief Complaint: Feeling weak today  Patient comments/goals: Return to PLOF  Pain/Comfort:  · Pain Rating 1: 0/10    Patients cultural, spiritual, Anabaptist conflicts given the current situation: none    Objective:     Patient found with: lackey catheter, peripheral IV, oxygen, PEG Tube , bile drain    General Precautions: Standard, fall, sternal, aspiration, NPO    Orthopedic Precautions:N/A   Braces: N/A     Functional Mobility:  · Transfers:  · Sit <> stand to/from bedside chair- 3x Shereen w/ no AD  · Pt given verbal cues to perform forward weight shift  · Pt maintained sternal precautions  · Gait:  · 110 ft w/ 1 seated rest break- Shereen w/ 1 LOB w/ ModA to recover  · Pt given verbal cues to look straight ahead and inform PT when feeling lightheaded or fatigued      AM-PAC 6 CLICK MOBILITY  Turning over in bed (including adjusting bedclothes, sheets and blankets)?: 3  Sitting down on and standing up from a chair with arms (e.g., wheelchair, bedside commode, etc.): 3  Moving from lying on back to sitting on the side of the bed?: 3  Moving to and from a bed to a chair (including a wheelchair)?: 3  Need to walk in hospital room?: 3  Climbing 3-5 steps with a railing?: 1  Total Score: 16       Therapeutic Activities and Exercises:  Pt educated on importance of informing PT of symptoms during ambulation to ensure safety  Pt educated on sternal precautions    Patient left up in chair with all lines intact and call button in reach..    GOALS:    Physical Therapy Goals        Problem: Physical Therapy Goal    Goal Priority Disciplines Outcome Goal Variances Interventions   Physical Therapy Goal     PT/OT, PT Ongoing (interventions implemented as appropriate)     Description:  Goals to be met by: 6/15/2018    Patient will increase functional independence with mobility by performin. Supine to sit with Supervision - not met  2. Sit to stand transfer with Supervision - not met  3. Bed to chair transfer with Supervision  - not met  4. Gait  x 250 feet with Supervision - not met  5. Lower extremity exercise program x20 reps per handout, with independence - met                         Time Tracking:     PT Received On: 18  PT Start Time: 1327     PT Stop Time: 1359  PT Total Time (min): 32 min     Billable Minutes: Gait Training 22 and Therapeutic Activity 10    Treatment  Type: Treatment  PT/PTA: PT     PTA Visit Number: 0     Chad Mckeon, SPT  06/12/2018

## 2018-06-12 NOTE — PLAN OF CARE
Problem: Patient Care Overview  Goal: Plan of Care Review  Plan of care discussed with patient. Patient is free of fall/trauma/injury. Denies CP, SOB, or pain/discomfort. working with pt/ pt and speech. Tube feedings infusing at goal. g tube draining. Goal is to wean o2. All questions addressed. Will continue to monitor

## 2018-06-12 NOTE — PT/OT/SLP PROGRESS
Speech Language Pathology  Attempted    Rodolfo Messina  MRN: [id number]    Patient not seen today secondary to Unavailable (Comment), Other (Comment) (pt w/ desaturation following nose bleeding and O2 via face mask-not appropriate for po trials and tx this date). SLP unable to return this date.  Will follow-up at a later date and time as able.    Leyda Chopra, JESSICA-SLP   6/12/2018

## 2018-06-12 NOTE — CONSULTS
Otolaryngology - Head and Neck Surgery  Consultation Report    Consultation From: MARYJO    Reason for consult: hearing loss    History of Present Illness: 67 y.o. 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  ENT is consulted for hearing loss.  He complains of longstanding hearing loss, right greater than left, which has gradually worsened.  He denies ear pain, ear drainage, bleeding from the ear, vertigo, or a history of ear infections.  He has not had prior ear surgery. He also reports that since his surgery, he can hear a low-pitched pulsatile tinnitus in both ears.      Review of Systems - Negative except as per HPI.    Past Medical History: Patient has a past medical history of ANNA (acute kidney injury) (2/22/2018); Anemia; Anxiety; Arthritis; BPH (benign prostatic hyperplasia); CHF (congestive heart failure) (5/23/2018); Coronary artery disease of native artery of native heart with stable angina pectoris (5/21/2018); Diverticulosis; Encounter for blood transfusion; Hypertension; and Urinary retention.    Past Surgical History: Patient has a past surgical history that includes Colonoscopy; Appendectomy; Tonsillectomy; Eye surgery (Right); Fracture surgery (Right); Finger surgery; Colonoscopy (N/A, 2/1/2018); Mitral valve replacement (N/A, 5/25/2018); and creation of aortocoronary artery bypass (N/A, 5/25/2018).    Social History: Patient reports that he has never smoked. He has never used smokeless tobacco. He reports that he does not drink alcohol or use drugs.    Family History: family history includes Heart disease in his father and mother; No Known Problems in his brother and sister; Stroke in his mother.    Medications:    albumin human 5%  25 g Intravenous Once    amiodarone  400 mg Per G Tube BID    amLODIPine  10 mg Per G Tube Daily    ampicillin IVPB  2 g Intravenous Q6H    aspirin  324 mg Per G Tube Daily    atorvastatin  40 mg Per G Tube Daily    cefTRIAXone  (ROCEPHIN) IVPB  2 g Intravenous Q12H    doxazosin  4 mg Per G Tube Daily    famotidine  20 mg Per G Tube Daily    hydrALAZINE  25 mg Per G Tube Q8H    mirtazapine  15 mg Per G Tube QHS    polyethylene glycol  17 g Per G Tube Daily    ramelteon  8 mg Per G Tube QHS    sodium chloride 0.9%  10 mL Intravenous Q6H    sodium chloride 0.9%  3 mL Intravenous Q8H    warfarin  5 mg Per G Tube Daily       Allergies: Patient is allergic to shellfish containing products; augmentin [amoxicillin-pot clavulanate]; ciprofloxacin; flagyl [metronidazole]; lisinopril; mysoline [primidone]; and omnicef [cefdinir].    Physical Exam:  Temp:  [97.4 °F (36.3 °C)-98.3 °F (36.8 °C)] 97.6 °F (36.4 °C)  Pulse:  [60-77] 74  Resp:  [18-20] 20  SpO2:  [80 %-93 %] 91 %  BP: ()/(51-73) 119/73    Constitutional:  NAD.  Alert and appropriate.  Eyes: EOM I Bilaterally  Head/Face: Normocephalic.  Negative paranasal sinus pressure/tenderness.  Salivary glands WNL.  House Brackmann I Bilaterally.  Right Ear: External Auditory Canal WNL,TM w/o masses/lesions/perforations.  Auricle WNL.  Left Ear: External Auditory Canal WNL,TM w/o masses/lesions/perforations. Auricle WNL.  Nose: No gross nasal septal deviation. Inferior Turbinates 2+ bilaterally. No septal perforation. No masses/lesions. External nasal skin without masses/lesions. Bilateral crusted blood at anterior nares, septal excoriations bilaterally, nasal cannula oxygen in place.  Oral Cavity: Gingiva/lips WNL.  FOM Soft, no masses palpated. Oral Tongue mobile. Hard Palate WNL.   Oropharynx: BOT WNL. No masses/lesions noted. Tonsillar fossa/pharyngeal wall without lesions. Posterior oropharynx WNL.  Soft palate without masses. Midline uvula.   Neck/Lymphatic: No LAD I-VI bilaterally.  No thyromegaly.  No masses noted on exam.  Neuro/Psychiatric: AOx3.  Normal mood and affect.   Cardiovascular: Normal carotid pulses bilaterally, no increasing jugular venous distention noted at  cervical region bilaterally.    Respiratory: Normal respiratory effort, no stridor, no retractions noted.    CBC    Recent Labs  Lab 06/12/18  0539   WBC 5.94   HGB 7.4*   HCT 23.3*   MCV 96        BMP    Recent Labs  Lab 06/12/18  0539   *   *   K 3.5      CO2 35*   BUN 61*   CREATININE 1.9*   CALCIUM 9.0   MG 2.9*     Assessment: Hearing loss, epistaxis    Plan:   1. No concern for ear infection or sudden sensorineural hearing loss; patient reports longstanding R>L hearing loss.  Normal ear exam today.  2. Recommend audiogram as outpatient.  3. For epistaxis, recommend Afrin BID x 3 days; no nasal cannula; humidified O2 via face tent if oxygen required; nasal saline spray at bedside.  4. Please call with questions, will sign off

## 2018-06-13 PROBLEM — R06.02 SOB (SHORTNESS OF BREATH): Status: ACTIVE | Noted: 2018-06-13

## 2018-06-13 LAB
ABO + RH BLD: NORMAL
ALBUMIN SERPL BCP-MCNC: 2.4 G/DL
ALBUMIN SERPL BCP-MCNC: 2.4 G/DL
ALBUMIN SERPL BCP-MCNC: 2.5 G/DL
ALLENS TEST: ABNORMAL
ALP SERPL-CCNC: 77 U/L
ALP SERPL-CCNC: 77 U/L
ALP SERPL-CCNC: 78 U/L
ALT SERPL W/O P-5'-P-CCNC: 12 U/L
ALT SERPL W/O P-5'-P-CCNC: 12 U/L
ALT SERPL W/O P-5'-P-CCNC: 13 U/L
ANION GAP SERPL CALC-SCNC: 10 MMOL/L
ANION GAP SERPL CALC-SCNC: 11 MMOL/L
ANION GAP SERPL CALC-SCNC: 12 MMOL/L
APTT BLDCRRT: 30.9 SEC
APTT BLDCRRT: 32.9 SEC
APTT BLDCRRT: 34.9 SEC
AST SERPL-CCNC: 20 U/L
AST SERPL-CCNC: 20 U/L
AST SERPL-CCNC: 23 U/L
BASOPHILS # BLD AUTO: 0.01 K/UL
BASOPHILS # BLD AUTO: 0.01 K/UL
BASOPHILS # BLD AUTO: 0.02 K/UL
BASOPHILS NFR BLD: 0.2 %
BILIRUB SERPL-MCNC: 0.7 MG/DL
BILIRUB SERPL-MCNC: 0.9 MG/DL
BILIRUB SERPL-MCNC: 2 MG/DL
BLD GP AB SCN CELLS X3 SERPL QL: NORMAL
BLD PROD TYP BPU: NORMAL
BLOOD GROUP ANTIBODIES SERPL: NORMAL
BLOOD UNIT EXPIRATION DATE: NORMAL
BLOOD UNIT TYPE CODE: 600
BLOOD UNIT TYPE: NORMAL
BUN SERPL-MCNC: 55 MG/DL
BUN SERPL-MCNC: 61 MG/DL
BUN SERPL-MCNC: 61 MG/DL
CALCIUM SERPL-MCNC: 8.7 MG/DL
CALCIUM SERPL-MCNC: 9 MG/DL
CALCIUM SERPL-MCNC: 9 MG/DL
CHLORIDE SERPL-SCNC: 100 MMOL/L
CHLORIDE SERPL-SCNC: 100 MMOL/L
CHLORIDE SERPL-SCNC: 102 MMOL/L
CO2 SERPL-SCNC: 34 MMOL/L
CO2 SERPL-SCNC: 35 MMOL/L
CO2 SERPL-SCNC: 38 MMOL/L
CODING SYSTEM: NORMAL
CREAT SERPL-MCNC: 1.9 MG/DL
CREAT SERPL-MCNC: 2 MG/DL
CREAT SERPL-MCNC: 2.1 MG/DL
DELSYS: ABNORMAL
DIFFERENTIAL METHOD: ABNORMAL
DISPENSE STATUS: NORMAL
EOSINOPHIL # BLD AUTO: 0.1 K/UL
EOSINOPHIL NFR BLD: 1.1 %
EOSINOPHIL NFR BLD: 1.8 %
EOSINOPHIL NFR BLD: 2.2 %
ERYTHROCYTE [DISTWIDTH] IN BLOOD BY AUTOMATED COUNT: 15.8 %
ERYTHROCYTE [DISTWIDTH] IN BLOOD BY AUTOMATED COUNT: 16.4 %
ERYTHROCYTE [DISTWIDTH] IN BLOOD BY AUTOMATED COUNT: 16.4 %
ERYTHROCYTE [SEDIMENTATION RATE] IN BLOOD BY WESTERGREN METHOD: 22 MM/H
EST. GFR  (AFRICAN AMERICAN): 36.6 ML/MIN/1.73 M^2
EST. GFR  (AFRICAN AMERICAN): 38.8 ML/MIN/1.73 M^2
EST. GFR  (AFRICAN AMERICAN): 41.3 ML/MIN/1.73 M^2
EST. GFR  (NON AFRICAN AMERICAN): 31.6 ML/MIN/1.73 M^2
EST. GFR  (NON AFRICAN AMERICAN): 33.5 ML/MIN/1.73 M^2
EST. GFR  (NON AFRICAN AMERICAN): 35.7 ML/MIN/1.73 M^2
FLOW: 15
GLUCOSE SERPL-MCNC: 105 MG/DL
GLUCOSE SERPL-MCNC: 115 MG/DL
GLUCOSE SERPL-MCNC: 168 MG/DL
HCO3 UR-SCNC: 39.3 MMOL/L (ref 24–28)
HCT VFR BLD AUTO: 21 %
HCT VFR BLD AUTO: 21.8 %
HCT VFR BLD AUTO: 27.1 %
HGB BLD-MCNC: 6.5 G/DL
HGB BLD-MCNC: 6.8 G/DL
HGB BLD-MCNC: 8.7 G/DL
IMM GRANULOCYTES # BLD AUTO: 0.03 K/UL
IMM GRANULOCYTES # BLD AUTO: 0.04 K/UL
IMM GRANULOCYTES # BLD AUTO: 0.07 K/UL
IMM GRANULOCYTES NFR BLD AUTO: 0.5 %
IMM GRANULOCYTES NFR BLD AUTO: 0.6 %
IMM GRANULOCYTES NFR BLD AUTO: 0.8 %
INR PPP: 1.7
LYMPHOCYTES # BLD AUTO: 0.3 K/UL
LYMPHOCYTES # BLD AUTO: 0.4 K/UL
LYMPHOCYTES # BLD AUTO: 0.5 K/UL
LYMPHOCYTES NFR BLD: 5.1 %
LYMPHOCYTES NFR BLD: 5.1 %
LYMPHOCYTES NFR BLD: 6.7 %
MAGNESIUM SERPL-MCNC: 2.6 MG/DL
MAGNESIUM SERPL-MCNC: 2.9 MG/DL
MCH RBC QN AUTO: 30.1 PG
MCH RBC QN AUTO: 30.4 PG
MCH RBC QN AUTO: 31.2 PG
MCHC RBC AUTO-ENTMCNC: 31 G/DL
MCHC RBC AUTO-ENTMCNC: 31.2 G/DL
MCHC RBC AUTO-ENTMCNC: 32.1 G/DL
MCV RBC AUTO: 97 FL
MODE: ABNORMAL
MONOCYTES # BLD AUTO: 0.4 K/UL
MONOCYTES # BLD AUTO: 0.5 K/UL
MONOCYTES # BLD AUTO: 0.6 K/UL
MONOCYTES NFR BLD: 6.7 %
MONOCYTES NFR BLD: 6.9 %
MONOCYTES NFR BLD: 8 %
NEUTROPHILS # BLD AUTO: 4.9 K/UL
NEUTROPHILS # BLD AUTO: 5.3 K/UL
NEUTROPHILS # BLD AUTO: 7.8 K/UL
NEUTROPHILS NFR BLD: 83.7 %
NEUTROPHILS NFR BLD: 84.3 %
NEUTROPHILS NFR BLD: 85.9 %
NRBC BLD-RTO: 0 /100 WBC
PCO2 BLDA: 43.7 MMHG (ref 35–45)
PH SMN: 7.56 [PH] (ref 7.35–7.45)
PHOSPHATE SERPL-MCNC: 3.9 MG/DL
PLATELET # BLD AUTO: 133 K/UL
PLATELET # BLD AUTO: 136 K/UL
PLATELET # BLD AUTO: 159 K/UL
PMV BLD AUTO: 12.1 FL
PMV BLD AUTO: 12.2 FL
PMV BLD AUTO: 12.5 FL
PO2 BLDA: 138 MMHG (ref 80–100)
POC BE: 17 MMOL/L
POC SATURATED O2: 99 % (ref 95–100)
POC TCO2: 41 MMOL/L (ref 23–27)
POCT GLUCOSE: 117 MG/DL (ref 70–110)
POTASSIUM SERPL-SCNC: 3.6 MMOL/L
POTASSIUM SERPL-SCNC: 3.6 MMOL/L
POTASSIUM SERPL-SCNC: 3.8 MMOL/L
POTASSIUM SERPL-SCNC: 5.7 MMOL/L
PREALB SERPL-MCNC: 16 MG/DL
PROT SERPL-MCNC: 5.8 G/DL
PROT SERPL-MCNC: 5.9 G/DL
PROT SERPL-MCNC: 6 G/DL
PROTHROMBIN TIME: 17 SEC
RBC # BLD AUTO: 2.16 M/UL
RBC # BLD AUTO: 2.24 M/UL
RBC # BLD AUTO: 2.79 M/UL
SAMPLE: ABNORMAL
SITE: ABNORMAL
SODIUM SERPL-SCNC: 147 MMOL/L
SODIUM SERPL-SCNC: 147 MMOL/L
SODIUM SERPL-SCNC: 148 MMOL/L
TRANS ERYTHROCYTES VOL PATIENT: NORMAL ML
TRIGL SERPL-MCNC: 32 MG/DL
WBC # BLD AUTO: 5.81 K/UL
WBC # BLD AUTO: 6.23 K/UL
WBC # BLD AUTO: 9.07 K/UL

## 2018-06-13 PROCEDURE — 25000003 PHARM REV CODE 250: Performed by: STUDENT IN AN ORGANIZED HEALTH CARE EDUCATION/TRAINING PROGRAM

## 2018-06-13 PROCEDURE — 85730 THROMBOPLASTIN TIME PARTIAL: CPT | Mod: 91

## 2018-06-13 PROCEDURE — 86901 BLOOD TYPING SEROLOGIC RH(D): CPT

## 2018-06-13 PROCEDURE — P9021 RED BLOOD CELLS UNIT: HCPCS

## 2018-06-13 PROCEDURE — 63600175 PHARM REV CODE 636 W HCPCS: Performed by: PHYSICIAN ASSISTANT

## 2018-06-13 PROCEDURE — 25000003 PHARM REV CODE 250: Performed by: NURSE PRACTITIONER

## 2018-06-13 PROCEDURE — 25000003 PHARM REV CODE 250: Performed by: PHYSICIAN ASSISTANT

## 2018-06-13 PROCEDURE — 94761 N-INVAS EAR/PLS OXIMETRY MLT: CPT

## 2018-06-13 PROCEDURE — 25000242 PHARM REV CODE 250 ALT 637 W/ HCPCS: Performed by: STUDENT IN AN ORGANIZED HEALTH CARE EDUCATION/TRAINING PROGRAM

## 2018-06-13 PROCEDURE — 63600175 PHARM REV CODE 636 W HCPCS

## 2018-06-13 PROCEDURE — 36600 WITHDRAWAL OF ARTERIAL BLOOD: CPT

## 2018-06-13 PROCEDURE — 94799 UNLISTED PULMONARY SVC/PX: CPT

## 2018-06-13 PROCEDURE — 84100 ASSAY OF PHOSPHORUS: CPT

## 2018-06-13 PROCEDURE — 93010 ELECTROCARDIOGRAM REPORT: CPT | Mod: ,,, | Performed by: INTERNAL MEDICINE

## 2018-06-13 PROCEDURE — 86870 RBC ANTIBODY IDENTIFICATION: CPT

## 2018-06-13 PROCEDURE — 20000000 HC ICU ROOM

## 2018-06-13 PROCEDURE — 82803 BLOOD GASES ANY COMBINATION: CPT

## 2018-06-13 PROCEDURE — A4216 STERILE WATER/SALINE, 10 ML: HCPCS | Performed by: THORACIC SURGERY (CARDIOTHORACIC VASCULAR SURGERY)

## 2018-06-13 PROCEDURE — 86922 COMPATIBILITY TEST ANTIGLOB: CPT

## 2018-06-13 PROCEDURE — 63600175 PHARM REV CODE 636 W HCPCS: Performed by: STUDENT IN AN ORGANIZED HEALTH CARE EDUCATION/TRAINING PROGRAM

## 2018-06-13 PROCEDURE — S0028 INJECTION, FAMOTIDINE, 20 MG: HCPCS | Performed by: STUDENT IN AN ORGANIZED HEALTH CARE EDUCATION/TRAINING PROGRAM

## 2018-06-13 PROCEDURE — 84132 ASSAY OF SERUM POTASSIUM: CPT

## 2018-06-13 PROCEDURE — 85610 PROTHROMBIN TIME: CPT

## 2018-06-13 PROCEDURE — 93005 ELECTROCARDIOGRAM TRACING: CPT

## 2018-06-13 PROCEDURE — 86077 PHYS BLOOD BANK SERV XMATCH: CPT | Mod: ,,, | Performed by: PATHOLOGY

## 2018-06-13 PROCEDURE — 85025 COMPLETE CBC W/AUTO DIFF WBC: CPT | Mod: 91

## 2018-06-13 PROCEDURE — 27000221 HC OXYGEN, UP TO 24 HOURS

## 2018-06-13 PROCEDURE — 99223 1ST HOSP IP/OBS HIGH 75: CPT | Mod: ,,, | Performed by: SURGERY

## 2018-06-13 PROCEDURE — 25000003 PHARM REV CODE 250: Performed by: THORACIC SURGERY (CARDIOTHORACIC VASCULAR SURGERY)

## 2018-06-13 PROCEDURE — 84134 ASSAY OF PREALBUMIN: CPT

## 2018-06-13 PROCEDURE — 83735 ASSAY OF MAGNESIUM: CPT | Mod: 91

## 2018-06-13 PROCEDURE — 80053 COMPREHEN METABOLIC PANEL: CPT | Mod: 91

## 2018-06-13 PROCEDURE — 84478 ASSAY OF TRIGLYCERIDES: CPT

## 2018-06-13 PROCEDURE — 94640 AIRWAY INHALATION TREATMENT: CPT

## 2018-06-13 RX ORDER — HYDROCODONE BITARTRATE AND ACETAMINOPHEN 500; 5 MG/1; MG/1
TABLET ORAL
Status: DISCONTINUED | OUTPATIENT
Start: 2018-06-13 | End: 2018-06-14

## 2018-06-13 RX ORDER — FUROSEMIDE 10 MG/ML
40 INJECTION INTRAMUSCULAR; INTRAVENOUS ONCE
Status: COMPLETED | OUTPATIENT
Start: 2018-06-13 | End: 2018-06-13

## 2018-06-13 RX ORDER — FAMOTIDINE 10 MG/ML
20 INJECTION INTRAVENOUS DAILY
Status: DISCONTINUED | OUTPATIENT
Start: 2018-06-13 | End: 2018-06-14

## 2018-06-13 RX ORDER — FUROSEMIDE 10 MG/ML
INJECTION INTRAMUSCULAR; INTRAVENOUS
Status: COMPLETED
Start: 2018-06-13 | End: 2018-06-13

## 2018-06-13 RX ADMIN — AMIODARONE HYDROCHLORIDE 400 MG: 200 TABLET ORAL at 08:06

## 2018-06-13 RX ADMIN — AMPICILLIN 2 G: 2 INJECTION, POWDER, FOR SOLUTION INTRAVENOUS at 04:06

## 2018-06-13 RX ADMIN — CEFTRIAXONE SODIUM 2 G: 2 INJECTION, POWDER, FOR SOLUTION INTRAMUSCULAR; INTRAVENOUS at 12:06

## 2018-06-13 RX ADMIN — ALTEPLASE 2 MG: 2.2 INJECTION, POWDER, LYOPHILIZED, FOR SOLUTION INTRAVENOUS at 11:06

## 2018-06-13 RX ADMIN — CEFTRIAXONE SODIUM 2 G: 2 INJECTION, POWDER, FOR SOLUTION INTRAMUSCULAR; INTRAVENOUS at 02:06

## 2018-06-13 RX ADMIN — Medication 3 ML: at 02:06

## 2018-06-13 RX ADMIN — FUROSEMIDE 40 MG: 10 INJECTION, SOLUTION INTRAVENOUS at 06:06

## 2018-06-13 RX ADMIN — FUROSEMIDE 40 MG: 10 INJECTION INTRAMUSCULAR; INTRAVENOUS at 06:06

## 2018-06-13 RX ADMIN — AMPICILLIN 2 G: 2 INJECTION, POWDER, FOR SOLUTION INTRAVENOUS at 11:06

## 2018-06-13 RX ADMIN — FAMOTIDINE 20 MG: 10 INJECTION, SOLUTION INTRAVENOUS at 09:06

## 2018-06-13 RX ADMIN — CHLOROTHIAZIDE SODIUM 250 MG: 500 INJECTION, POWDER, LYOPHILIZED, FOR SOLUTION INTRAVENOUS at 06:06

## 2018-06-13 RX ADMIN — RAMELTEON 8 MG: 8 TABLET, FILM COATED ORAL at 08:06

## 2018-06-13 RX ADMIN — OXYMETAZOLINE HYDROCHLORIDE 2 SPRAY: 5 SPRAY NASAL at 08:06

## 2018-06-13 RX ADMIN — MIRTAZAPINE 15 MG: 15 TABLET, FILM COATED ORAL at 08:06

## 2018-06-13 RX ADMIN — HYDRALAZINE HYDROCHLORIDE 25 MG: 25 TABLET, FILM COATED ORAL at 09:06

## 2018-06-13 RX ADMIN — HYDRALAZINE HYDROCHLORIDE 25 MG: 25 TABLET, FILM COATED ORAL at 06:06

## 2018-06-13 RX ADMIN — GUAIFENESIN AND DEXTROMETHORPHAN 5 ML: 100; 10 SYRUP ORAL at 04:06

## 2018-06-13 RX ADMIN — IPRATROPIUM BROMIDE AND ALBUTEROL SULFATE 3 ML: .5; 3 SOLUTION RESPIRATORY (INHALATION) at 05:06

## 2018-06-13 RX ADMIN — Medication 10 ML: at 06:06

## 2018-06-13 RX ADMIN — Medication 3 ML: at 09:06

## 2018-06-13 NOTE — SUBJECTIVE & OBJECTIVE
Interval History: No acute events overnight. This am had desat event, placed on NRB and transferred to the SICU. EKG shows NSR w/1st degree AV block. CXR with whiteout of right lung. 40 lasix given. Pt then vomited clotted blood, ENT to evaluate.    Medications:  Continuous Infusions:   dextrose 5 % Stopped (06/13/18 0551)     Scheduled Meds:   amiodarone  400 mg Per G Tube BID    amLODIPine  10 mg Per G Tube Daily    ampicillin IVPB  2 g Intravenous Q6H    aspirin  324 mg Per G Tube Daily    atorvastatin  40 mg Per G Tube Daily    cefTRIAXone (ROCEPHIN) IVPB  2 g Intravenous Q12H    doxazosin  4 mg Per G Tube Daily    famotidine (PF)  20 mg Intravenous Daily    hydrALAZINE  25 mg Per G Tube Q8H    mirtazapine  15 mg Per G Tube QHS    oxymetazoline  2 spray Each Nare BID    polyethylene glycol  17 g Per G Tube Daily    ramelteon  8 mg Per G Tube QHS    sodium chloride 0.9%  10 mL Intravenous Q6H    sodium chloride 0.9%  3 mL Intravenous Q8H    warfarin  5 mg Per G Tube Daily     PRN Meds:albuterol-ipratropium, bisacodyl, dextromethorphan-guaifenesin  mg/5 ml, dextrose 50%, docusate, glucagon (human recombinant), hydrALAZINE, insulin aspart U-100, lactated ringers, metoclopramide HCl, morphine, ondansetron, oxyCODONE, sodium chloride, Flushing PICC Protocol **AND** sodium chloride 0.9% **AND** sodium chloride 0.9%, white petrolatum-mineral oil     Objective:     Vital Signs (Most Recent):  Temp: 98.1 °F (36.7 °C) (06/13/18 1100)  Pulse: 72 (06/13/18 1100)  Resp: (!) 23 (06/13/18 1100)  BP: (!) 120/58 (06/13/18 1100)  SpO2: 97 % (06/13/18 1100) Vital Signs (24h Range):  Temp:  [96.7 °F (35.9 °C)-98.1 °F (36.7 °C)] 98.1 °F (36.7 °C)  Pulse:  [64-77] 72  Resp:  [14-44] 23  SpO2:  [81 %-100 %] 97 %  BP: ()/(50-72) 120/58     Weight: 76.4 kg (168 lb 6.9 oz)  Body mass index is 24.17 kg/m².    SpO2: 97 %  O2 Device (Oxygen Therapy): venti mask    Intake/Output - Last 3 Shifts       06/11 0700 -  06/12 0659 06/12 0700 - 06/13 0659 06/13 0700 - 06/14 0659    P.O. 0 0     I.V. (mL/kg) 959.8 (12.6) 365 (4.8)     NG/GT 1138 509 10    IV Piggyback 800 300     Total Intake(mL/kg) 2897.8 (37.9) 1174 (15.4) 10 (0.1)    Urine (mL/kg/hr) 1170 (0.6) 1150 (0.6) 510 (1.5)    Drains 1600 (0.9) 805 (0.4)     Total Output 2770 1955 510    Net +127.8 -781 -500                 Lines/Drains/Airways     Peripherally Inserted Central Catheter Line                 PICC Double Lumen 03/05/18 1539 right basilic 99 days         PICC Double Lumen 06/04/18 1654 left brachial 8 days          Central Venous Catheter Line                 Percutaneous Central Line Insertion/Assessment - Quad lumen  05/25/18 0800 left subclavian 19 days          Drain                 Gastrostomy/Enterostomy 06/07/18 1657 Gastrostomy-jejunostomy LUQ 5 days         Urethral Catheter 06/11/18 1711 Latex 1 day          Line                 Pacer Wires 05/25/18 1355 18 days                Physical Exam   Constitutional: He appears well-developed and well-nourished.   Cardiovascular: Normal rate, regular rhythm and normal heart sounds.    Pulmonary/Chest: Effort normal.   NRB   Abdominal: Soft.   PEG intact   Neurological: He is alert.   Skin: Skin is warm, dry and intact.   Psychiatric: He has a normal mood and affect.       Significant Labs:  CBC:   Recent Labs  Lab 06/13/18  0423   WBC 5.81   RBC 2.16*   HGB 6.5*   HCT 21.0*   *   MCV 97   MCH 30.1   MCHC 31.0*     CMP:   Recent Labs  Lab 06/13/18  0423   *   CALCIUM 8.7   ALBUMIN 2.4*   PROT 5.8*   *   K 3.6   CO2 35*      BUN 61*   CREATININE 2.0*   ALKPHOS 78   ALT 12   AST 20   BILITOT 0.7

## 2018-06-13 NOTE — SUBJECTIVE & OBJECTIVE
Follow-up For: Procedure(s) (LRB):  Mitral Valve Replacement (N/A)  AORTOCORONARY BYPASS-CABG (N/A)    Post-Operative Day: 19 Days Post-Op     Past Medical History:   Diagnosis Date    ANNA (acute kidney injury) 2/22/2018    Anemia     Anxiety     Arthritis     BPH (benign prostatic hyperplasia)     CHF (congestive heart failure) 5/23/2018    Coronary artery disease of native artery of native heart with stable angina pectoris 5/21/2018    Diverticulosis     Encounter for blood transfusion     Hypertension     Urinary retention        Past Surgical History:   Procedure Laterality Date    APPENDECTOMY      COLONOSCOPY      COLONOSCOPY N/A 2/1/2018    Procedure: COLONOSCOPY;  Surgeon: Richar Payan MD;  Location: [facility] ([facility]);  Service: Endoscopy;  Laterality: N/A;  PM prep    CREATION OF AORTOCORONARY ARTERY BYPASS N/A 5/25/2018    Procedure: AORTOCORONARY BYPASS-CABG;  Surgeon: Marvin Go MD;  Location: [facility] OR [address] [place] [address];  Service: Cardiovascular;  Laterality: N/A;    EYE SURGERY Right     cataract extraction    FINGER SURGERY      FRACTURE SURGERY Right     index finger    MITRAL VALVE REPLACEMENT N/A 5/25/2018    Procedure: Mitral Valve Replacement;  Surgeon: Marvin Go MD;  Location: [facility] OR [address] [place] [address];  Service: Cardiovascular;  Laterality: N/A;    TONSILLECTOMY         Review of patient's allergies indicates:   Allergen Reactions    Shellfish containing products     Augmentin [amoxicillin-pot clavulanate]      Patient felt that it raised BP and requested to have it added as intolerance. Tolerated unasyn and ampicillin.    Ciprofloxacin     Flagyl [metronidazole]     Lisinopril Other (See Comments)     cough    Mysoline [primidone]     Omnicef [cefdinir]        Family History     Problem Relation (Age of Onset)    Heart disease Mother, Father    No Known Problems Sister, Brother    Stroke Mother        Social History Main Topics    Smoking status: Never Smoker     Smokeless tobacco: Never Used    Alcohol use No      Comment: none recently    Drug use: No    Sexual activity: Not Currently     Partners: Female      Review of Systems   Constitutional: Negative for chills, fatigue and fever.   HENT: Positive for hearing loss. Negative for congestion and sore throat.    Eyes: Negative for pain and redness.   Respiratory: Positive for shortness of breath. Negative for cough.    Cardiovascular: Negative for chest pain and palpitations.   Gastrointestinal: Negative for abdominal distention and abdominal pain.   Genitourinary: Negative for difficulty urinating, dysuria and enuresis.   Musculoskeletal: Negative for joint swelling and myalgias.   Skin: Negative for color change and pallor.   Neurological: Negative for dizziness, syncope, light-headedness and headaches.     Objective:     Vital Signs (Most Recent):  Temp: 97.7 °F (36.5 °C) (06/13/18 0640)  Pulse: 69 (06/13/18 0830)  Resp: 18 (06/13/18 0830)  BP: (!) 120/58 (06/13/18 0800)  SpO2: 100 % (06/13/18 0830) Vital Signs (24h Range):  Temp:  [96.7 °F (35.9 °C)-97.7 °F (36.5 °C)] 97.7 °F (36.5 °C)  Pulse:  [64-77] 69  Resp:  [14-44] 18  SpO2:  [80 %-100 %] 100 %  BP: ()/(50-73) 120/58     Weight: 76.4 kg (168 lb 6.9 oz)  Body mass index is 24.17 kg/m².      Intake/Output Summary (Last 24 hours) at 06/13/18 0853  Last data filed at 06/13/18 0800   Gross per 24 hour   Intake             1184 ml   Output             2180 ml   Net             -996 ml       Physical Exam   Constitutional: He is oriented to person, place, and time. He appears well-developed.   Thin  Wife at bedside   HENT:   Head: Normocephalic and atraumatic.   Eyes: EOM are normal. Pupils are equal, round, and reactive to light.   Neck: Neck supple.   Cardiovascular: Normal rate and regular rhythm.    Pulmonary/Chest: Effort normal. No respiratory distress.   Abdominal: Soft. There is no tenderness.   JG tube noted   Musculoskeletal: Normal range of motion.  He exhibits no deformity.   Neurological: He is alert and oriented to person, place, and time. No sensory deficit. He exhibits normal muscle tone.   -no focal weakness   Skin: Skin is warm and dry.   Psychiatric: He has a normal mood and affect. His behavior is normal.       Vents:  Vent Mode: Spont (05/26/18 2025)  Ventilator Initiated: Yes (05/25/18 1509)  Set Rate: 0 bmp (05/26/18 2025)  Vt Set: 500 mL (05/26/18 2025)  Pressure Support: 10 cmH20 (05/26/18 2025)  PEEP/CPAP: 5 cmH20 (05/26/18 2025)  Oxygen Concentration (%): 24 (05/29/18 0402)  Peak Airway Pressure: 16 cmH2O (05/26/18 2025)  Plateau Pressure: 0 cmH20 (05/26/18 2025)  Total Ve: 11.8 mL (05/26/18 2025)  F/VT Ratio<105 (RSBI): (!) 32.76 (05/26/18 2025)    Lines/Drains/Airways     Peripherally Inserted Central Catheter Line                 PICC Double Lumen 03/05/18 1539 right basilic 99 days         PICC Double Lumen 06/04/18 1654 left brachial 8 days          Central Venous Catheter Line                 Percutaneous Central Line Insertion/Assessment - Quad lumen  05/25/18 0800 left subclavian 19 days          Drain                 Gastrostomy/Enterostomy 06/07/18 1657 Gastrostomy-jejunostomy LUQ 5 days         Urethral Catheter 06/11/18 1711 Latex 1 day          Line                 Pacer Wires 05/25/18 1355 18 days                Significant Labs:    CBC/Anemia Profile:    Recent Labs  Lab 06/12/18  0539 06/13/18  0423   WBC 5.94 5.81   HGB 7.4* 6.5*   HCT 23.3* 21.0*    133*   MCV 96 97   RDW 16.6* 16.4*        Chemistries:    Recent Labs  Lab 06/12/18  0539 06/13/18  0423   * 147*   K 3.5 3.6    100   CO2 35* 35*   BUN 61* 61*   CREATININE 1.9* 2.0*   CALCIUM 9.0 8.7   ALBUMIN 2.2* 2.4*   PROT 5.7* 5.8*   BILITOT 0.6 0.7   ALKPHOS 84 78   ALT 13 12   AST 22 20   MG 2.9*  --    PHOS 4.0 3.9       Coagulation:   Recent Labs  Lab 06/13/18  0423   INR 1.7*   APTT 30.9       Significant Imaging: I have reviewed all pertinent imaging  results/findings within the past 24 hours.

## 2018-06-13 NOTE — PT/OT/SLP PROGRESS
Speech Language Pathology      Rodolfo Messina  MRN: [id number]    Patient not seen today secondary to Other (Comment). Pt not appropriate for ST session today 2/2 respiratory status, coughing up blood, on venti mask, NSG holding PO. SLP communicated with NSG. Will follow-up as scheduled.     Pt transferred to ICU. Reconsult when appropriate.    Virginia Mccall, JESSICA-SLP   6/13/2018

## 2018-06-13 NOTE — H&P
[facility]  Critical Care - Surgery  History & Physical    Patient Name: Rodolfo Messina  MRN: [id number]  Admission Date: 5/23/2018  Code Status: Full Code  Attending Physician: Marvin Go MD   Primary Care Provider: Deric Claudio MD   Principal Problem: S/P MVR (mitral valve replacement)    Subjective:     HPI:  Mr. Messina is a 66yo M with a h/o mitral valve endocarditis and resultant severe MR, TR and pulmonary HTN.  He was admitted to the SICU pre-operatively for IABP placement and subsequently underwent MVR and CABG on 5/25. Post op course complicated by CHB and LA thrombus. He is currently paced and started on AC. He was stepped down 5/29 but subsequently developed an ANNA with spike in BUN/Cr and re-admission to the SICU 5/30 for closer monitoring. He remained in the SICU for several days, was stepped down to the floor and then re-admitted to the SICU 6/13 given SOB and increased work of breathing. Upon transfer to the SICU he was placed on venti-mask for supplemental O2 administration.      Hospital/ICU Course:  No notes on file    Follow-up For: Procedure(s) (LRB):  Mitral Valve Replacement (N/A)  AORTOCORONARY BYPASS-CABG (N/A)    Post-Operative Day: 19 Days Post-Op     Past Medical History:   Diagnosis Date    ANNA (acute kidney injury) 2/22/2018    Anemia     Anxiety     Arthritis     BPH (benign prostatic hyperplasia)     CHF (congestive heart failure) 5/23/2018    Coronary artery disease of native artery of native heart with stable angina pectoris 5/21/2018    Diverticulosis     Encounter for blood transfusion     Hypertension     Urinary retention        Past Surgical History:   Procedure Laterality Date    APPENDECTOMY      COLONOSCOPY      COLONOSCOPY N/A 2/1/2018    Procedure: COLONOSCOPY;  Surgeon: Richar Payan MD;  Location: [facility] ([address] [place] [address]);  Service: Endoscopy;  Laterality: N/A;  PM prep    CREATION OF AORTOCORONARY ARTERY BYPASS N/A 5/25/2018     Procedure: AORTOCORONARY BYPASS-CABG;  Surgeon: Marvin Go MD;  Location: [facility] OR [address] [place] [address];  Service: Cardiovascular;  Laterality: N/A;    EYE SURGERY Right     cataract extraction    FINGER SURGERY      FRACTURE SURGERY Right     index finger    MITRAL VALVE REPLACEMENT N/A 5/25/2018    Procedure: Mitral Valve Replacement;  Surgeon: Marvin Go MD;  Location: [address];  Service: Cardiovascular;  Laterality: N/A;    TONSILLECTOMY         Review of patient's allergies indicates:   Allergen Reactions    Shellfish containing products     Augmentin [amoxicillin-pot clavulanate]      Patient felt that it raised BP and requested to have it added as intolerance. Tolerated unasyn and ampicillin.    Ciprofloxacin     Flagyl [metronidazole]     Lisinopril Other (See Comments)     cough    Mysoline [primidone]     Omnicef [cefdinir]        Family History     Problem Relation (Age of Onset)    Heart disease Mother, Father    No Known Problems Sister, Brother    Stroke Mother        Social History Main Topics    Smoking status: Never Smoker    Smokeless tobacco: Never Used    Alcohol use No      Comment: none recently    Drug use: No    Sexual activity: Not Currently     Partners: Female      Review of Systems   Constitutional: Negative for chills, fatigue and fever.   HENT: Positive for hearing loss. Negative for congestion and sore throat.    Eyes: Negative for pain and redness.   Respiratory: Positive for shortness of breath. Negative for cough.    Cardiovascular: Negative for chest pain and palpitations.   Gastrointestinal: Negative for abdominal distention and abdominal pain.   Genitourinary: Negative for difficulty urinating, dysuria and enuresis.   Musculoskeletal: Negative for joint swelling and myalgias.   Skin: Negative for color change and pallor.   Neurological: Negative for dizziness, syncope, light-headedness and headaches.     Objective:     Vital Signs (Most Recent):  Temp: 97.7 °F  (36.5 °C) (06/13/18 0640)  Pulse: 69 (06/13/18 0830)  Resp: 18 (06/13/18 0830)  BP: (!) 120/58 (06/13/18 0800)  SpO2: 100 % (06/13/18 0830) Vital Signs (24h Range):  Temp:  [96.7 °F (35.9 °C)-97.7 °F (36.5 °C)] 97.7 °F (36.5 °C)  Pulse:  [64-77] 69  Resp:  [14-44] 18  SpO2:  [80 %-100 %] 100 %  BP: ()/(50-73) 120/58     Weight: 76.4 kg (168 lb 6.9 oz)  Body mass index is 24.17 kg/m².      Intake/Output Summary (Last 24 hours) at 06/13/18 0853  Last data filed at 06/13/18 0800   Gross per 24 hour   Intake             1184 ml   Output             2180 ml   Net             -996 ml       Physical Exam   Constitutional: He is oriented to person, place, and time. He appears well-developed.   Thin  Wife at bedside   HENT:   Head: Normocephalic and atraumatic.   Eyes: EOM are normal. Pupils are equal, round, and reactive to light.   Neck: Neck supple.   Cardiovascular: Normal rate and regular rhythm.    Pulmonary/Chest: Effort normal. No respiratory distress.   Abdominal: Soft. There is no tenderness.   JG tube noted   Musculoskeletal: Normal range of motion. He exhibits no deformity.   Neurological: He is alert and oriented to person, place, and time. No sensory deficit. He exhibits normal muscle tone.   -no focal weakness   Skin: Skin is warm and dry.   Psychiatric: He has a normal mood and affect. His behavior is normal.       Vents:  Vent Mode: Spont (05/26/18 2025)  Ventilator Initiated: Yes (05/25/18 1509)  Set Rate: 0 bmp (05/26/18 2025)  Vt Set: 500 mL (05/26/18 2025)  Pressure Support: 10 cmH20 (05/26/18 2025)  PEEP/CPAP: 5 cmH20 (05/26/18 2025)  Oxygen Concentration (%): 24 (05/29/18 0402)  Peak Airway Pressure: 16 cmH2O (05/26/18 2025)  Plateau Pressure: 0 cmH20 (05/26/18 2025)  Total Ve: 11.8 mL (05/26/18 2025)  F/VT Ratio<105 (RSBI): (!) 32.76 (05/26/18 2025)    Lines/Drains/Airways     Peripherally Inserted Central Catheter Line                 PICC Double Lumen 03/05/18 1539 right basilic 99 days          PICC Double Lumen 06/04/18 1654 left brachial 8 days          Central Venous Catheter Line                 Percutaneous Central Line Insertion/Assessment - Quad lumen  05/25/18 0800 left subclavian 19 days          Drain                 Gastrostomy/Enterostomy 06/07/18 1657 Gastrostomy-jejunostomy LUQ 5 days         Urethral Catheter 06/11/18 1711 Latex 1 day          Line                 Pacer Wires 05/25/18 1355 18 days                Significant Labs:    CBC/Anemia Profile:    Recent Labs  Lab 06/12/18  0539 06/13/18  0423   WBC 5.94 5.81   HGB 7.4* 6.5*   HCT 23.3* 21.0*    133*   MCV 96 97   RDW 16.6* 16.4*        Chemistries:    Recent Labs  Lab 06/12/18  0539 06/13/18  0423   * 147*   K 3.5 3.6    100   CO2 35* 35*   BUN 61* 61*   CREATININE 1.9* 2.0*   CALCIUM 9.0 8.7   ALBUMIN 2.2* 2.4*   PROT 5.7* 5.8*   BILITOT 0.6 0.7   ALKPHOS 84 78   ALT 13 12   AST 22 20   MG 2.9*  --    PHOS 4.0 3.9       Coagulation:   Recent Labs  Lab 06/13/18  0423   INR 1.7*   APTT 30.9       Significant Imaging: I have reviewed all pertinent imaging results/findings within the past 24 hours.    Assessment/Plan:     Chronic systolic congestive heart failure     68yo M with PMH of mitral valve endocarditis and resultant severe MR, TR and pulmonary HTN who is now s/p IABP placement (5/24) and s/pMVR and CABG 5/25.     Plan:     Neuro:   - AAOx4 this AM.   - PRN oxy via J tube for pain control     Pulmonary:   - Currently on facemask for supplemental O2, wean as tolerated  - Continuous pulse ox.   - ABGs/CXR if distressed.   -monitor CXR     Cardiac:  - MAP goal >65  - Paced at 80  - Home amlodipine, ASA, and statin daily.  - Coumadin therapy.  - Continuous cardiac monitoring.  - Pathology from valve returned with active subacute endocarditis. S/p ID consultation. PICC placed for long term abx administration.  Per ID Ampicillin and Ceftriaxone x 6 weeks for Enterococcal SBE (end date 7/13/18). In addition,  patient will need ESR, CRP, CBC and CMP to be drawn weekly with results faxed to the ID Department at [phone] set up prior to discharge. Appreciate sw assistance.       Renal:   -UOP adequate.   -Bun/Cr stable, 61/2.0  -Flomax daily given home use.  - MIVF off     Fluids/Electrolytes/Nutrition/GI:   - Continue with tube feeds to goal  - Free water flushes    - d/c D5W gtt  - failed MBSS  - Replace lytes PRN.  - Bowel regimen.     Hematology/Oncology:  - H/H 6.5/21 from 7.4/23.3 yesterday, Transfusion per primary team.  - INR daily, 1.7 today  - CBC daily.     Infectious Disease:   -Afebrile  -WBC wnl  -CBC daily   -Pathology from valve with active endocarditis, ID consulted, recommend Ampicillin and Ceftriaxone x 6 weeks for Enterococcal SBE (end date 7/13/18). In addition, patient will need ESR, CRP, CBC and CMP to be drawn weekly with results faxed to the ID Department at [phone] set up prior to discharge. Appreciate sw assistance.      Endocrine:  - No h/o DM or thyroid dysfunction  - s/p Endocrine evaluation. Continue following recs.   - Glucose goal of 120-180     Dispo:  - Re-admit to SICU  -[facility] primary                  Critical care was time spent personally by me on the following activities: development of treatment plan with patient or surrogate and bedside caregivers, discussions with consultants, evaluation of patient's response to treatment, examination of patient, ordering and performing treatments and interventions, ordering and review of laboratory studies, ordering and review of radiographic studies, pulse oximetry, re-evaluation of patient's condition.  This critical care time did not overlap with that of any other provider or involve time for any procedures.     Pepper Chadwick MD  Critical Care - Surgery  [facility]

## 2018-06-13 NOTE — PROGRESS NOTES
Dr. Hawkins notified of pt throwing up blood clots; orders received to hold all medication at this time. Will continue to monitor respiratory status closely.

## 2018-06-13 NOTE — PLAN OF CARE
Pt was transferred to SICU.  SW will f/u as needed for placement.    Radha Montes, [facility] [id number]

## 2018-06-13 NOTE — PROGRESS NOTES
Pt currently AAOx4, follows commands, but very drowsy at this time. Pt is currently on 12 L of O2 per high flow NC with O2 sats currently 100%. Pt received 1 unit of PRBCs for low H&H this shift and was also given 40 mg of Lasix; UOP remains adequate. Pt remained afebrile throughout this shift with all VSS. Will continue to monitor pt closely. See flowsheets for specific details.

## 2018-06-13 NOTE — ASSESSMENT & PLAN NOTE
68yo M with PMH of mitral valve endocarditis and resultant severe MR, TR and pulmonary HTN who is now s/p IABP placement (5/24) and s/pMVR and CABG 5/25.     Plan:     Neuro:   - AAOx4 this AM.   - PRN oxy via J tube for pain control     Pulmonary:   - Currently on facemask for supplemental O2, wean as tolerated  - Continuous pulse ox.   - ABGs/CXR if distressed.   -monitor CXR     Cardiac:  - MAP goal >65  - Paced at 80  - Home amlodipine, ASA, and statin daily.  - Coumadin therapy.  - Continuous cardiac monitoring.  - Pathology from valve returned with active subacute endocarditis. S/p ID consultation. PICC placed for long term abx administration.  Per ID Ampicillin and Ceftriaxone x 6 weeks for Enterococcal SBE (end date 7/13/18). In addition, patient will need ESR, CRP, CBC and CMP to be drawn weekly with results faxed to the ID Department at [phone] set up prior to discharge. Appreciate sw assistance.       Renal:   -UOP adequate.   -Bun/Cr stable, 61/2.0  -Flomax daily given home use.  - MIVF off     Fluids/Electrolytes/Nutrition/GI:   - Continue with tube feeds to goal  - Free water flushes    - d/c D5W gtt  - failed MBSS  - Replace lytes PRN.  - Bowel regimen.     Hematology/Oncology:  - H/H 6.5/21 from 7.4/23.3 yesterday, Transfusion per primary team.  - INR daily, 1.7 today  - CBC daily.     Infectious Disease:   -Afebrile  -WBC wnl  -CBC daily   -Pathology from valve with active endocarditis, ID consulted, recommend Ampicillin and Ceftriaxone x 6 weeks for Enterococcal SBE (end date 7/13/18). In addition, patient will need ESR, CRP, CBC and CMP to be drawn weekly with results faxed to the ID Department at [phone] set up prior to discharge. Appreciate sw assistance.      Endocrine:  - No h/o DM or thyroid dysfunction  - s/p Endocrine evaluation. Continue following recs.   - Glucose goal of 120-180     Dispo:  - Re-admit to SICU  -[facility] primary

## 2018-06-13 NOTE — ASSESSMENT & PLAN NOTE
--Noted to have post traumatic left sided epistaxis following attempted NGT placement. On the time of evaluation patient was hemostatic. No active bleeding noted.   -Vomited blood clots; ENT to re-evaluate  -Given that patient is hemostatic, no acute intervention   -Recommend minimize nasal trauma as best possible  -If requires oxygen, ideal if it is humidified via face tent and not nasal canula  -Bacitracin to bilateral nares nightly  -Afrin BID x 3 days  -Ocean nasal spray q6hrs while awake  -If bleeding restarts, hold pressure to fleshy portion of nose (10 mins) and lean forward  -For bleeding that does not stop, page resident  -Remainder of medical management per primary team

## 2018-06-13 NOTE — ASSESSMENT & PLAN NOTE
68 yo male S/p MVR and CABG. Previously seen for epistaxis and SNHL.  Patient has been hemostatic otherwise. Coughed up old nasal clot. CTS requested OHNS to evaluate. No active bleeding currently.    -Given that patient is hemostatic, no acute intervention   -Clot was likely just old clot from nasal cavity.   -If requires oxygen, ideal if it is humidified via face tent and not nasal canula  -Bacitracin to bilateral nares nightly  -Afrin q6hrs PRN bleeding (only if ok from Cards prospective)  -Ocean nasal spray q6hrs while awake   -If bleeding restarts, hold pressure to fleshy portion of nose (10 mins) and lean forward  -For bleeding that does not stop, page resident  -Remainder of medical management per primary team

## 2018-06-13 NOTE — PLAN OF CARE
Problem: Occupational Therapy Goal  Goal: Occupational Therapy Goal  Updated Goals to be met by: 6/19/18     Patient will increase functional independence with ADLs by performing:    UE Dressing with Supervision.  LE Dressing with Supervision.  Grooming while standing at sink with Supervision.  Toileting from toilet with Supervision for hygiene and clothing management.   Toilet transfer to toilet with Supervision.  Functional mobility household distance with SBA.    Goals to be met by: 6/10/18     Patient will increase functional independence with ADLs by performing:    Feeding with Modified [employer].  UE Dressing with Supervision.  LE Dressing with Supervision.  Grooming while standing at sink with Supervision.  Toileting from toilet with Supervision for hygiene and clothing management.   Toilet transfer to toilet with Supervision.       Outcome: Outcome(s) achieved Date Met: 06/13/18  D/C to ICU; need new orders for re-eval when appropriate

## 2018-06-13 NOTE — ASSESSMENT & PLAN NOTE
--Continue ASA, statin (holding BB)  --ENT consult for epistaxis  --NRB satting well, wean as tolerated for O2 sat >92%  --40 lasix given once; monitor response  --1u PRBC given  --Sternal precautions  --PT/OT  --Ambulate x4  --monitor CXR    --Monitor electrolytes and replace prn  --Coumadin 5mg today will repeat INR this afternoon  --D5W @50  -- Q6  --continue  lackey   --strict I&Os  --did not pass modified swallow; PICC  --G-J tube placed; TF held        DISPO: stepped up to ICU, discharge planning on going for rehab

## 2018-06-13 NOTE — SUBJECTIVE & OBJECTIVE
Interval History: Patient coughed up nasal blood clot from nasal cavity. CTS requested Otolaryngology to evaluate. No further bleeding, doesn't report sensation of blood draining down throat.     Medications:  Continuous Infusions:   dextrose 5 % Stopped (06/13/18 0551)     Scheduled Meds:   amiodarone  400 mg Per G Tube BID    amLODIPine  10 mg Per G Tube Daily    ampicillin IVPB  2 g Intravenous Q6H    aspirin  324 mg Per G Tube Daily    atorvastatin  40 mg Per G Tube Daily    cefTRIAXone (ROCEPHIN) IVPB  2 g Intravenous Q12H    doxazosin  4 mg Per G Tube Daily    famotidine (PF)  20 mg Intravenous Daily    hydrALAZINE  25 mg Per G Tube Q8H    mirtazapine  15 mg Per G Tube QHS    oxymetazoline  2 spray Each Nare BID    polyethylene glycol  17 g Per G Tube Daily    ramelteon  8 mg Per G Tube QHS    sodium chloride 0.9%  10 mL Intravenous Q6H    sodium chloride 0.9%  3 mL Intravenous Q8H    warfarin  5 mg Per G Tube Daily     PRN Meds:sodium chloride, albuterol-ipratropium, bisacodyl, dextromethorphan-guaifenesin  mg/5 ml, dextrose 50%, docusate, glucagon (human recombinant), hydrALAZINE, insulin aspart U-100, lactated ringers, metoclopramide HCl, morphine, ondansetron, oxyCODONE, sodium chloride, Flushing PICC Protocol **AND** sodium chloride 0.9% **AND** sodium chloride 0.9%, white petrolatum-mineral oil     Review of patient's allergies indicates:   Allergen Reactions    Shellfish containing products     Augmentin [amoxicillin-pot clavulanate]      Patient felt that it raised BP and requested to have it added as intolerance. Tolerated unasyn and ampicillin.    Ciprofloxacin     Flagyl [metronidazole]     Lisinopril Other (See Comments)     cough    Mysoline [primidone]     Omnicef [cefdinir]      Objective:     Vital Signs (24h Range):  Temp:  [96.7 °F (35.9 °C)-98.1 °F (36.7 °C)] 98 °F (36.7 °C)  Pulse:  [67-77] 73  Resp:  [15-44] 25  SpO2:  [81 %-100 %] 99 %  BP: ()/(50-72)  126/60       Date 06/13/18 0700 - 06/14/18 0659   Shift 6739-9133 4871-6370 0590-0322 24 Hour Total   I  N  T  A  K  E   NG/GT 10   10    Shift Total  (mL/kg) 10  (0.1)   10  (0.1)   O  U  T  P  U  T   Urine  (mL/kg/hr) 745  (1.2) 120  865    Shift Total  (mL/kg) 745  (9.8) 120  (1.6)  865  (11.3)   Weight (kg) 76.4 76.4 76.4 76.4     Lines/Drains/Airways     Peripherally Inserted Central Catheter Line                 PICC Double Lumen 03/05/18 1539 right basilic 99 days         PICC Double Lumen 06/04/18 1654 left brachial 8 days          Central Venous Catheter Line                 Percutaneous Central Line Insertion/Assessment - Quad lumen  05/25/18 0800 left subclavian 19 days          Drain                 Gastrostomy/Enterostomy 06/07/18 1657 Gastrostomy-jejunostomy LUQ 5 days         Urethral Catheter 06/11/18 1711 Latex 1 day          Line                 Pacer Wires 05/25/18 1355 19 days                Physical Exam    General: Alert,  Comfortable, no acute distress  Voice: Regular for age, Slightly weak volume.   Respiratory: Symmetric breathing, no stridor, no distress  Head: Normocephalic, no lesions  Face: Symmetric, HB 1/6 bilat, no lesions, no obvious sinus tenderness, salivary glands nontender  Eyes: Sclera white, extraocular movements intact  Nose: Old small clots in nasal cavity. No active bleeding  Oral cavity/OP: Old blood stained mucus in OP. No active bleeding. Very dry mucosa on soft palate with old clots.   Neck: Supple, no palpable nodes, no masses, trachea midline, no thyroid masses  Cardiovascular system: Sternotomy incision intact.     Significant Labs:  BMP:   Recent Labs  Lab 06/13/18  1140   *      CO2 38*   BUN 61*   CREATININE 2.1*   CALCIUM 9.0   MG 2.9*     CBC:   Recent Labs  Lab 06/13/18  1140   WBC 6.23   RBC 2.24*   HGB 6.8*   HCT 21.8*   *   MCV 97   MCH 30.4   MCHC 31.2*       Significant Diagnostics:  None

## 2018-06-13 NOTE — ASSESSMENT & PLAN NOTE
--EP following appreciate recs., will reach out to them- pt currently in Afib that began 6/6- Amio IV load and infusion, now on PO amio  --holding BB

## 2018-06-13 NOTE — PT/OT/SLP DISCHARGE
Occupational Therapy Discharge Summary    Rodolfo Messina  MRN: [id number]   Principal Problem: S/P MVR (mitral valve replacement)      Patient Discharged from acute Occupational Therapy on 6/12/18.  Please refer to prior OT note dated 6/12/18 for functional status.    Assessment:      Patient was discharged unexpectedly.  Information required to complete an accurate discharge summary is unknown.  Refer to therapy initial evaluation and last progress note for initial and most recent functional status and goal achievement.  Recommendations made may be found in medical record.    Objective:     GOALS:    Occupational Therapy Goals        Problem: Occupational Therapy Goal    Goal Priority Disciplines Outcome Interventions   Occupational Therapy Goal     OT, PT/OT Ongoing (interventions implemented as appropriate)    Description:  Updated Goals to be met by: 6/19/18     Patient will increase functional independence with ADLs by performing:    UE Dressing with Supervision.  LE Dressing with Supervision.  Grooming while standing at sink with Supervision.  Toileting from toilet with Supervision for hygiene and clothing management.   Toilet transfer to toilet with Supervision.  Functional mobility household distance with SBA.    Goals to be met by: 6/10/18     Patient will increase functional independence with ADLs by performing:    Feeding with Modified [employer].  UE Dressing with Supervision.  LE Dressing with Supervision.  Grooming while standing at sink with Supervision.  Toileting from toilet with Supervision for hygiene and clothing management.   Toilet transfer to toilet with Supervision.                        Reasons for Discontinuation of Therapy Services  Patient is unable to continue work toward goals because of medical or psychosocial complications.      Plan:     Patient Discharged to: ICU due to respiratory distress    MILTON Jasso  6/13/2018

## 2018-06-13 NOTE — SIGNIFICANT EVENT
Called into pt room by wife, pt stating he was having trouble breathing. Course and wet lung sounds, O2 sats 85%, labored breathing. Called for duoneb and notified MD. Chest Xray ordered. Pt placed on venti mask, little improvement. Pt did not improve after duoneb and required nonrebreather to increase sats >92%. MD called to bedside. ABGs ordered. D5@50 and TF stopped for now. Lasix given and orders to move to SICU.

## 2018-06-13 NOTE — PROGRESS NOTES
[facility]  Otorhinolaryngology-Head & Neck Surgery  Progress Note    Subjective:     Post-Op Info:  Procedure(s) (LRB):  Mitral Valve Replacement (N/A)  AORTOCORONARY BYPASS-CABG (N/A)   19 Days Post-Op  Hospital Day: 22     Interval History: Patient coughed up nasal blood clot from nasal cavity. CTS requested Otolaryngology to evaluate. No further bleeding, doesn't report sensation of blood draining down throat.     Medications:  Continuous Infusions:   dextrose 5 % Stopped (06/13/18 0551)     Scheduled Meds:   amiodarone  400 mg Per G Tube BID    amLODIPine  10 mg Per G Tube Daily    ampicillin IVPB  2 g Intravenous Q6H    aspirin  324 mg Per G Tube Daily    atorvastatin  40 mg Per G Tube Daily    cefTRIAXone (ROCEPHIN) IVPB  2 g Intravenous Q12H    doxazosin  4 mg Per G Tube Daily    famotidine (PF)  20 mg Intravenous Daily    hydrALAZINE  25 mg Per G Tube Q8H    mirtazapine  15 mg Per G Tube QHS    oxymetazoline  2 spray Each Nare BID    polyethylene glycol  17 g Per G Tube Daily    ramelteon  8 mg Per G Tube QHS    sodium chloride 0.9%  10 mL Intravenous Q6H    sodium chloride 0.9%  3 mL Intravenous Q8H    warfarin  5 mg Per G Tube Daily     PRN Meds:sodium chloride, albuterol-ipratropium, bisacodyl, dextromethorphan-guaifenesin  mg/5 ml, dextrose 50%, docusate, glucagon (human recombinant), hydrALAZINE, insulin aspart U-100, lactated ringers, metoclopramide HCl, morphine, ondansetron, oxyCODONE, sodium chloride, Flushing PICC Protocol **AND** sodium chloride 0.9% **AND** sodium chloride 0.9%, white petrolatum-mineral oil     Review of patient's allergies indicates:   Allergen Reactions    Shellfish containing products     Augmentin [amoxicillin-pot clavulanate]      Patient felt that it raised BP and requested to have it added as intolerance. Tolerated unasyn and ampicillin.    Ciprofloxacin     Flagyl [metronidazole]     Lisinopril Other (See Comments)      cough    Mysoline [primidone]     Omnicef [cefdinir]      Objective:     Vital Signs (24h Range):  Temp:  [96.7 °F (35.9 °C)-98.1 °F (36.7 °C)] 98 °F (36.7 °C)  Pulse:  [67-77] 73  Resp:  [15-44] 25  SpO2:  [81 %-100 %] 99 %  BP: ()/(50-72) 126/60       Date 06/13/18 0700 - 06/14/18 0659   Shift 0492-2053 4263-5483 5228-3495 24 Hour Total   I  N  T  A  K  E   NG/GT 10   10    Shift Total  (mL/kg) 10  (0.1)   10  (0.1)   O  U  T  P  U  T   Urine  (mL/kg/hr) 745  (1.2) 120  865    Shift Total  (mL/kg) 745  (9.8) 120  (1.6)  865  (11.3)   Weight (kg) 76.4 76.4 76.4 76.4     Lines/Drains/Airways     Peripherally Inserted Central Catheter Line                 PICC Double Lumen 03/05/18 1539 right basilic 99 days         PICC Double Lumen 06/04/18 1654 left brachial 8 days          Central Venous Catheter Line                 Percutaneous Central Line Insertion/Assessment - Quad lumen  05/25/18 0800 left subclavian 19 days          Drain                 Gastrostomy/Enterostomy 06/07/18 1657 Gastrostomy-jejunostomy LUQ 5 days         Urethral Catheter 06/11/18 1711 Latex 1 day          Line                 Pacer Wires 05/25/18 1355 19 days                Physical Exam    General: Alert,  Comfortable, no acute distress  Voice: Regular for age, Slightly weak volume.   Respiratory: Symmetric breathing, no stridor, no distress  Head: Normocephalic, no lesions  Face: Symmetric, HB 1/6 bilat, no lesions, no obvious sinus tenderness, salivary glands nontender  Eyes: Sclera white, extraocular movements intact  Nose: Old small clots in nasal cavity. No active bleeding  Oral cavity/OP: Old blood stained mucus in OP. No active bleeding. Very dry mucosa on soft palate with old clots.   Neck: Supple, no palpable nodes, no masses, trachea midline, no thyroid masses  Cardiovascular system: Sternotomy incision intact.     Significant Labs:  BMP:   Recent Labs  Lab 06/13/18  1140   *      CO2 38*   BUN 61*   CREATININE  2.1*   CALCIUM 9.0   MG 2.9*     CBC:   Recent Labs  Lab 06/13/18  1140   WBC 6.23   RBC 2.24*   HGB 6.8*   HCT 21.8*   *   MCV 97   MCH 30.4   MCHC 31.2*       Significant Diagnostics:  None    Assessment/Plan:     Epistaxis    66 yo male S/p MVR and CABG. Previously seen for epistaxis and SNHL.  Patient has been hemostatic otherwise. Coughed up old nasal clot. CTS requested OHNS to evaluate. No active bleeding currently.    -Given that patient is hemostatic, no acute intervention   -Clot was likely just old clot from nasal cavity.   -If requires oxygen, ideal if it is humidified via face tent and not nasal canula  -Bacitracin to bilateral nares nightly  -Afrin q6hrs PRN bleeding (only if ok from Cards prospective)  -Ocean nasal spray q6hrs while awake   -If bleeding restarts, hold pressure to fleshy portion of nose (10 mins) and lean forward  -For bleeding that does not stop, page resident  -Remainder of medical management per primary team            Balwinder Ortiz MD  Otorhinolaryngology-Head & Neck Surgery  [facility]

## 2018-06-13 NOTE — PROGRESS NOTES
Pt admitted to SICU from the floor for SOB and increased work of breathing; pt currently on non-rebreather with current O2 sats 100% and all other VSS. Pt does not complain of any pain at this time, but does complain of tenderness near the insertion site of the G/J tube. Wife is currently at bedside with pt. Will continue to monitor pt closely. See flowsheets for specific details.

## 2018-06-13 NOTE — PROGRESS NOTES
[facility]  Cardiothoracic Surgery  Progress Note    Patient Name: Rodolfo Messina  MRN: [id number]  Admission Date: 5/23/2018  Hospital Length of Stay: 21 days  Code Status: Full Code   Attending Physician: Marvin Go MD   Referring Provider: Marvin Go MD  Principal Problem:S/P MVR (mitral valve replacement)    Subjective:     Post-Op Info:  Procedure(s) (LRB):  Mitral Valve Replacement (N/A)  AORTOCORONARY BYPASS-CABG (N/A)   19 Days Post-Op     Interval History: No acute events overnight. This am had desat event, placed on NRB and transferred to the SICU. EKG shows NSR w/1st degree AV block. CXR with whiteout of right lung. 40 lasix given. Pt then vomited clotted blood, ENT to evaluate.    Medications:  Continuous Infusions:   dextrose 5 % Stopped (06/13/18 0551)     Scheduled Meds:   amiodarone  400 mg Per G Tube BID    amLODIPine  10 mg Per G Tube Daily    ampicillin IVPB  2 g Intravenous Q6H    aspirin  324 mg Per G Tube Daily    atorvastatin  40 mg Per G Tube Daily    cefTRIAXone (ROCEPHIN) IVPB  2 g Intravenous Q12H    doxazosin  4 mg Per G Tube Daily    famotidine (PF)  20 mg Intravenous Daily    hydrALAZINE  25 mg Per G Tube Q8H    mirtazapine  15 mg Per G Tube QHS    oxymetazoline  2 spray Each Nare BID    polyethylene glycol  17 g Per G Tube Daily    ramelteon  8 mg Per G Tube QHS    sodium chloride 0.9%  10 mL Intravenous Q6H    sodium chloride 0.9%  3 mL Intravenous Q8H    warfarin  5 mg Per G Tube Daily     PRN Meds:albuterol-ipratropium, bisacodyl, dextromethorphan-guaifenesin  mg/5 ml, dextrose 50%, docusate, glucagon (human recombinant), hydrALAZINE, insulin aspart U-100, lactated ringers, metoclopramide HCl, morphine, ondansetron, oxyCODONE, sodium chloride, Flushing PICC Protocol **AND** sodium chloride 0.9% **AND** sodium chloride 0.9%, white petrolatum-mineral oil     Objective:     Vital Signs (Most Recent):  Temp: 98.1 °F (36.7 °C) (06/13/18  1100)  Pulse: 72 (06/13/18 1100)  Resp: (!) 23 (06/13/18 1100)  BP: (!) 120/58 (06/13/18 1100)  SpO2: 97 % (06/13/18 1100) Vital Signs (24h Range):  Temp:  [96.7 °F (35.9 °C)-98.1 °F (36.7 °C)] 98.1 °F (36.7 °C)  Pulse:  [64-77] 72  Resp:  [14-44] 23  SpO2:  [81 %-100 %] 97 %  BP: ()/(50-72) 120/58     Weight: 76.4 kg (168 lb 6.9 oz)  Body mass index is 24.17 kg/m².    SpO2: 97 %  O2 Device (Oxygen Therapy): venti mask    Intake/Output - Last 3 Shifts       06/11 0700 - 06/12 0659 06/12 0700 - 06/13 0659 06/13 0700 - 06/14 0659    P.O. 0 0     I.V. (mL/kg) 959.8 (12.6) 365 (4.8)     NG/GT 1138 509 10    IV Piggyback 800 300     Total Intake(mL/kg) 2897.8 (37.9) 1174 (15.4) 10 (0.1)    Urine (mL/kg/hr) 1170 (0.6) 1150 (0.6) 510 (1.5)    Drains 1600 (0.9) 805 (0.4)     Total Output 2770 1955 510    Net +127.8 -781 -500                 Lines/Drains/Airways     Peripherally Inserted Central Catheter Line                 PICC Double Lumen 03/05/18 1539 right basilic 99 days         PICC Double Lumen 06/04/18 1654 left brachial 8 days          Central Venous Catheter Line                 Percutaneous Central Line Insertion/Assessment - Quad lumen  05/25/18 0800 left subclavian 19 days          Drain                 Gastrostomy/Enterostomy 06/07/18 1657 Gastrostomy-jejunostomy LUQ 5 days         Urethral Catheter 06/11/18 1711 Latex 1 day          Line                 Pacer Wires 05/25/18 1355 18 days                Physical Exam   Constitutional: He appears well-developed and well-nourished.   Cardiovascular: Normal rate, regular rhythm and normal heart sounds.    Pulmonary/Chest: Effort normal.   NRB   Abdominal: Soft.   PEG intact   Neurological: He is alert.   Skin: Skin is warm, dry and intact.   Psychiatric: He has a normal mood and affect.       Significant Labs:  CBC:   Recent Labs  Lab 06/13/18  0423   WBC 5.81   RBC 2.16*   HGB 6.5*   HCT 21.0*   *   MCV 97   MCH 30.1   MCHC 31.0*     CMP:   Recent  Labs  Lab 06/13/18  0423   *   CALCIUM 8.7   ALBUMIN 2.4*   PROT 5.8*   *   K 3.6   CO2 35*      BUN 61*   CREATININE 2.0*   ALKPHOS 78   ALT 12   AST 20   BILITOT 0.7     Assessment/Plan:     * S/P MVR (mitral valve replacement)    --Continue ASA, statin (holding BB)  --ENT consult for epistaxis  --NRB satting well, wean as tolerated for O2 sat >92%  --40 lasix given once; monitor response  --1u PRBC given  --Sternal precautions  --PT/OT  --Ambulate x4  --monitor CXR    --Monitor electrolytes and replace prn  --Coumadin 5mg today will repeat INR this afternoon  --D5W @50  -- Q6  --continue  lackey   --strict I&Os  --did not pass modified swallow; PICC  --G-J tube placed; TF held        DISPO: stepped up to ICU, discharge planning on going for rehab           Dysphagia    --SLP following  --failed last BMS study   --GJ tube placed by IR 6/7        Impaired functional mobility and endurance    --discharging to rehab  --PT/OT daily         On enteral nutrition    --TFs at goal; held for emesis of blood clots        Epistaxis    --Noted to have post traumatic left sided epistaxis following attempted NGT placement. On the time of evaluation patient was hemostatic. No active bleeding noted.   -Vomited blood clots; ENT to re-evaluate  -Given that patient is hemostatic, no acute intervention   -Recommend minimize nasal trauma as best possible  -If requires oxygen, ideal if it is humidified via face tent and not nasal canula  -Bacitracin to bilateral nares nightly  -Afrin BID x 3 days  -Ocean nasal spray q6hrs while awake  -If bleeding restarts, hold pressure to fleshy portion of nose (10 mins) and lean forward  -For bleeding that does not stop, page resident  -Remainder of medical management per primary team        Atrial flutter    --EP following appreciate recs., will reach out to them- pt currently in Afib that began 6/6- Amio IV load and infusion, now on PO amio  --holding BB        S/P CABG x  1    --see MVR         Chronic systolic congestive heart failure    - ASA, statin  - D/c beta blocker  - EP following; appreciate recs          ANNA (acute kidney injury)    --Elevated Cr  --holding Stuart Hawkins MD  Cardiothoracic Surgery  [facility]

## 2018-06-14 LAB
ALBUMIN SERPL BCP-MCNC: 2.3 G/DL
ALBUMIN SERPL BCP-MCNC: 2.3 G/DL
ALBUMIN SERPL BCP-MCNC: 2.4 G/DL
ALBUMIN SERPL BCP-MCNC: 2.5 G/DL
ALP SERPL-CCNC: 74 U/L
ALP SERPL-CCNC: 77 U/L
ALP SERPL-CCNC: 80 U/L
ALP SERPL-CCNC: 88 U/L
ALT SERPL W/O P-5'-P-CCNC: 11 U/L
ALT SERPL W/O P-5'-P-CCNC: 12 U/L
ALT SERPL W/O P-5'-P-CCNC: 12 U/L
ALT SERPL W/O P-5'-P-CCNC: 13 U/L
ANION GAP SERPL CALC-SCNC: 10 MMOL/L
ANION GAP SERPL CALC-SCNC: 13 MMOL/L
ANION GAP SERPL CALC-SCNC: 14 MMOL/L
ANION GAP SERPL CALC-SCNC: 17 MMOL/L
APTT BLDCRRT: 38 SEC
APTT BLDCRRT: 38.3 SEC
APTT BLDCRRT: 40.6 SEC
APTT BLDCRRT: 41.1 SEC
AST SERPL-CCNC: 21 U/L
BASOPHILS # BLD AUTO: 0.01 K/UL
BASOPHILS # BLD AUTO: 0.02 K/UL
BASOPHILS # BLD AUTO: 0.02 K/UL
BASOPHILS # BLD AUTO: 0.03 K/UL
BASOPHILS NFR BLD: 0.1 %
BASOPHILS NFR BLD: 0.3 %
BASOPHILS NFR BLD: 0.3 %
BASOPHILS NFR BLD: 0.4 %
BILIRUB SERPL-MCNC: 1.5 MG/DL
BILIRUB SERPL-MCNC: 1.5 MG/DL
BILIRUB SERPL-MCNC: 1.9 MG/DL
BILIRUB SERPL-MCNC: 2.2 MG/DL
BUN SERPL-MCNC: 53 MG/DL
BUN SERPL-MCNC: 54 MG/DL
BUN SERPL-MCNC: 56 MG/DL
BUN SERPL-MCNC: 57 MG/DL
CALCIUM SERPL-MCNC: 8.4 MG/DL
CALCIUM SERPL-MCNC: 8.5 MG/DL
CALCIUM SERPL-MCNC: 8.6 MG/DL
CALCIUM SERPL-MCNC: 9.2 MG/DL
CHLORIDE SERPL-SCNC: 104 MMOL/L
CHLORIDE SERPL-SCNC: 105 MMOL/L
CHLORIDE SERPL-SCNC: 107 MMOL/L
CHLORIDE SERPL-SCNC: 108 MMOL/L
CO2 SERPL-SCNC: 29 MMOL/L
CO2 SERPL-SCNC: 30 MMOL/L
CO2 SERPL-SCNC: 30 MMOL/L
CO2 SERPL-SCNC: 35 MMOL/L
CREAT SERPL-MCNC: 1.9 MG/DL
DIFFERENTIAL METHOD: ABNORMAL
EOSINOPHIL # BLD AUTO: 0.1 K/UL
EOSINOPHIL # BLD AUTO: 0.2 K/UL
EOSINOPHIL NFR BLD: 1.4 %
EOSINOPHIL NFR BLD: 1.5 %
EOSINOPHIL NFR BLD: 1.8 %
EOSINOPHIL NFR BLD: 1.8 %
ERYTHROCYTE [DISTWIDTH] IN BLOOD BY AUTOMATED COUNT: 16.3 %
ERYTHROCYTE [DISTWIDTH] IN BLOOD BY AUTOMATED COUNT: 16.4 %
ERYTHROCYTE [DISTWIDTH] IN BLOOD BY AUTOMATED COUNT: 16.5 %
ERYTHROCYTE [DISTWIDTH] IN BLOOD BY AUTOMATED COUNT: 16.6 %
EST. GFR  (AFRICAN AMERICAN): 41.3 ML/MIN/1.73 M^2
EST. GFR  (NON AFRICAN AMERICAN): 35.7 ML/MIN/1.73 M^2
GLUCOSE SERPL-MCNC: 109 MG/DL
GLUCOSE SERPL-MCNC: 144 MG/DL
GLUCOSE SERPL-MCNC: 82 MG/DL
GLUCOSE SERPL-MCNC: 89 MG/DL
HCT VFR BLD AUTO: 20.1 %
HCT VFR BLD AUTO: 24.8 %
HCT VFR BLD AUTO: 26.3 %
HCT VFR BLD AUTO: 27.1 %
HGB BLD-MCNC: 6.4 G/DL
HGB BLD-MCNC: 7.9 G/DL
HGB BLD-MCNC: 8.6 G/DL
HGB BLD-MCNC: 8.8 G/DL
IMM GRANULOCYTES # BLD AUTO: 0.03 K/UL
IMM GRANULOCYTES # BLD AUTO: 0.03 K/UL
IMM GRANULOCYTES # BLD AUTO: 0.04 K/UL
IMM GRANULOCYTES # BLD AUTO: 0.05 K/UL
IMM GRANULOCYTES NFR BLD AUTO: 0.5 %
IMM GRANULOCYTES NFR BLD AUTO: 0.5 %
IMM GRANULOCYTES NFR BLD AUTO: 0.6 %
IMM GRANULOCYTES NFR BLD AUTO: 0.6 %
INR PPP: 2.6
LYMPHOCYTES # BLD AUTO: 0.5 K/UL
LYMPHOCYTES # BLD AUTO: 0.6 K/UL
LYMPHOCYTES NFR BLD: 5.6 %
LYMPHOCYTES NFR BLD: 7.3 %
LYMPHOCYTES NFR BLD: 7.6 %
LYMPHOCYTES NFR BLD: 7.9 %
MAGNESIUM SERPL-MCNC: 2.3 MG/DL
MAGNESIUM SERPL-MCNC: 2.3 MG/DL
MAGNESIUM SERPL-MCNC: 2.4 MG/DL
MAGNESIUM SERPL-MCNC: 2.5 MG/DL
MCH RBC QN AUTO: 30.4 PG
MCH RBC QN AUTO: 30.6 PG
MCH RBC QN AUTO: 31 PG
MCH RBC QN AUTO: 31.2 PG
MCHC RBC AUTO-ENTMCNC: 31.8 G/DL
MCHC RBC AUTO-ENTMCNC: 31.9 G/DL
MCHC RBC AUTO-ENTMCNC: 32.5 G/DL
MCHC RBC AUTO-ENTMCNC: 32.7 G/DL
MCV RBC AUTO: 95 FL
MCV RBC AUTO: 96 FL
MONOCYTES # BLD AUTO: 0.5 K/UL
MONOCYTES # BLD AUTO: 0.5 K/UL
MONOCYTES # BLD AUTO: 0.6 K/UL
MONOCYTES # BLD AUTO: 0.7 K/UL
MONOCYTES NFR BLD: 7.9 %
MONOCYTES NFR BLD: 8 %
MONOCYTES NFR BLD: 8.3 %
MONOCYTES NFR BLD: 8.7 %
NEUTROPHILS # BLD AUTO: 4.9 K/UL
NEUTROPHILS # BLD AUTO: 5.3 K/UL
NEUTROPHILS # BLD AUTO: 6 K/UL
NEUTROPHILS # BLD AUTO: 6.9 K/UL
NEUTROPHILS NFR BLD: 81.1 %
NEUTROPHILS NFR BLD: 82 %
NEUTROPHILS NFR BLD: 82.2 %
NEUTROPHILS NFR BLD: 83.6 %
NRBC BLD-RTO: 0 /100 WBC
PHOSPHATE SERPL-MCNC: 4.4 MG/DL
PLATELET # BLD AUTO: 147 K/UL
PLATELET # BLD AUTO: 152 K/UL
PLATELET # BLD AUTO: 156 K/UL
PLATELET # BLD AUTO: 159 K/UL
PMV BLD AUTO: 11.9 FL
PMV BLD AUTO: 11.9 FL
PMV BLD AUTO: 12.1 FL
PMV BLD AUTO: 12.3 FL
POCT GLUCOSE: 94 MG/DL (ref 70–110)
POTASSIUM SERPL-SCNC: 3.6 MMOL/L
POTASSIUM SERPL-SCNC: 3.7 MMOL/L
POTASSIUM SERPL-SCNC: 3.7 MMOL/L
POTASSIUM SERPL-SCNC: 4.7 MMOL/L
PROT SERPL-MCNC: 5.7 G/DL
PROT SERPL-MCNC: 6.1 G/DL
PROT SERPL-MCNC: 6.3 G/DL
PROT SERPL-MCNC: 6.5 G/DL
PROTHROMBIN TIME: 25.5 SEC
RBC # BLD AUTO: 2.09 M/UL
RBC # BLD AUTO: 2.6 M/UL
RBC # BLD AUTO: 2.76 M/UL
RBC # BLD AUTO: 2.84 M/UL
SODIUM SERPL-SCNC: 150 MMOL/L
SODIUM SERPL-SCNC: 150 MMOL/L
SODIUM SERPL-SCNC: 151 MMOL/L
SODIUM SERPL-SCNC: 151 MMOL/L
WBC # BLD AUTO: 6.08 K/UL
WBC # BLD AUTO: 6.41 K/UL
WBC # BLD AUTO: 7.25 K/UL
WBC # BLD AUTO: 8.21 K/UL

## 2018-06-14 PROCEDURE — 63600175 PHARM REV CODE 636 W HCPCS: Performed by: PHYSICIAN ASSISTANT

## 2018-06-14 PROCEDURE — 99233 SBSQ HOSP IP/OBS HIGH 50: CPT | Mod: ,,, | Performed by: SURGERY

## 2018-06-14 PROCEDURE — 25000003 PHARM REV CODE 250: Performed by: THORACIC SURGERY (CARDIOTHORACIC VASCULAR SURGERY)

## 2018-06-14 PROCEDURE — P9021 RED BLOOD CELLS UNIT: HCPCS

## 2018-06-14 PROCEDURE — 25000003 PHARM REV CODE 250: Performed by: NURSE PRACTITIONER

## 2018-06-14 PROCEDURE — 27000221 HC OXYGEN, UP TO 24 HOURS

## 2018-06-14 PROCEDURE — 20000000 HC ICU ROOM

## 2018-06-14 PROCEDURE — 83735 ASSAY OF MAGNESIUM: CPT

## 2018-06-14 PROCEDURE — 83735 ASSAY OF MAGNESIUM: CPT | Mod: 91

## 2018-06-14 PROCEDURE — S0028 INJECTION, FAMOTIDINE, 20 MG: HCPCS | Performed by: STUDENT IN AN ORGANIZED HEALTH CARE EDUCATION/TRAINING PROGRAM

## 2018-06-14 PROCEDURE — 94761 N-INVAS EAR/PLS OXIMETRY MLT: CPT

## 2018-06-14 PROCEDURE — 25000003 PHARM REV CODE 250: Performed by: PHYSICIAN ASSISTANT

## 2018-06-14 PROCEDURE — 80053 COMPREHEN METABOLIC PANEL: CPT

## 2018-06-14 PROCEDURE — 85610 PROTHROMBIN TIME: CPT

## 2018-06-14 PROCEDURE — 85730 THROMBOPLASTIN TIME PARTIAL: CPT | Mod: 91

## 2018-06-14 PROCEDURE — 84100 ASSAY OF PHOSPHORUS: CPT

## 2018-06-14 PROCEDURE — 80053 COMPREHEN METABOLIC PANEL: CPT | Mod: 91

## 2018-06-14 PROCEDURE — 85025 COMPLETE CBC W/AUTO DIFF WBC: CPT | Mod: 91

## 2018-06-14 PROCEDURE — 25000003 PHARM REV CODE 250: Performed by: STUDENT IN AN ORGANIZED HEALTH CARE EDUCATION/TRAINING PROGRAM

## 2018-06-14 PROCEDURE — 85730 THROMBOPLASTIN TIME PARTIAL: CPT

## 2018-06-14 PROCEDURE — 63600175 PHARM REV CODE 636 W HCPCS: Performed by: STUDENT IN AN ORGANIZED HEALTH CARE EDUCATION/TRAINING PROGRAM

## 2018-06-14 PROCEDURE — A4216 STERILE WATER/SALINE, 10 ML: HCPCS | Performed by: THORACIC SURGERY (CARDIOTHORACIC VASCULAR SURGERY)

## 2018-06-14 RX ORDER — FUROSEMIDE 10 MG/ML
40 INJECTION INTRAMUSCULAR; INTRAVENOUS ONCE
Status: DISCONTINUED | OUTPATIENT
Start: 2018-06-14 | End: 2018-06-14

## 2018-06-14 RX ORDER — POTASSIUM CHLORIDE 20 MEQ/15ML
60 SOLUTION ORAL
Status: DISCONTINUED | OUTPATIENT
Start: 2018-06-14 | End: 2018-06-20 | Stop reason: HOSPADM

## 2018-06-14 RX ORDER — SODIUM,POTASSIUM PHOSPHATES 280-250MG
2 POWDER IN PACKET (EA) ORAL
Status: DISCONTINUED | OUTPATIENT
Start: 2018-06-14 | End: 2018-06-20 | Stop reason: HOSPADM

## 2018-06-14 RX ORDER — POTASSIUM CHLORIDE 20 MEQ/15ML
40 SOLUTION ORAL
Status: DISCONTINUED | OUTPATIENT
Start: 2018-06-14 | End: 2018-06-20 | Stop reason: HOSPADM

## 2018-06-14 RX ORDER — LANOLIN ALCOHOL/MO/W.PET/CERES
800 CREAM (GRAM) TOPICAL
Status: DISCONTINUED | OUTPATIENT
Start: 2018-06-14 | End: 2018-06-20 | Stop reason: HOSPADM

## 2018-06-14 RX ORDER — AMIODARONE HYDROCHLORIDE 200 MG/1
400 TABLET ORAL DAILY
Status: DISCONTINUED | OUTPATIENT
Start: 2018-06-20 | End: 2018-06-16

## 2018-06-14 RX ORDER — AMIODARONE HYDROCHLORIDE 200 MG/1
400 TABLET ORAL 2 TIMES DAILY
Status: DISCONTINUED | OUTPATIENT
Start: 2018-06-14 | End: 2018-06-16

## 2018-06-14 RX ORDER — HYDROCODONE BITARTRATE AND ACETAMINOPHEN 500; 5 MG/1; MG/1
TABLET ORAL
Status: DISCONTINUED | OUTPATIENT
Start: 2018-06-14 | End: 2018-06-18

## 2018-06-14 RX ORDER — FAMOTIDINE 20 MG/1
20 TABLET, FILM COATED ORAL DAILY
Status: DISCONTINUED | OUTPATIENT
Start: 2018-06-15 | End: 2018-06-20 | Stop reason: HOSPADM

## 2018-06-14 RX ADMIN — ASPIRIN 81 MG CHEWABLE TABLET 324 MG: 81 TABLET CHEWABLE at 08:06

## 2018-06-14 RX ADMIN — AMPICILLIN 2 G: 2 INJECTION, POWDER, FOR SOLUTION INTRAVENOUS at 09:06

## 2018-06-14 RX ADMIN — CHLOROTHIAZIDE SODIUM 250 MG: 500 INJECTION, POWDER, LYOPHILIZED, FOR SOLUTION INTRAVENOUS at 08:06

## 2018-06-14 RX ADMIN — AMPICILLIN 2 G: 2 INJECTION, POWDER, FOR SOLUTION INTRAVENOUS at 05:06

## 2018-06-14 RX ADMIN — Medication 10 ML: at 11:06

## 2018-06-14 RX ADMIN — CEFTRIAXONE SODIUM 2 G: 2 INJECTION, POWDER, FOR SOLUTION INTRAMUSCULAR; INTRAVENOUS at 12:06

## 2018-06-14 RX ADMIN — AMIODARONE HYDROCHLORIDE 400 MG: 200 TABLET ORAL at 08:06

## 2018-06-14 RX ADMIN — HYDRALAZINE HYDROCHLORIDE 25 MG: 25 TABLET, FILM COATED ORAL at 06:06

## 2018-06-14 RX ADMIN — OXYMETAZOLINE HYDROCHLORIDE 2 SPRAY: 5 SPRAY NASAL at 08:06

## 2018-06-14 RX ADMIN — CEFTRIAXONE SODIUM 2 G: 2 INJECTION, POWDER, FOR SOLUTION INTRAMUSCULAR; INTRAVENOUS at 11:06

## 2018-06-14 RX ADMIN — HYDRALAZINE HYDROCHLORIDE 25 MG: 25 TABLET, FILM COATED ORAL at 01:06

## 2018-06-14 RX ADMIN — Medication 3 ML: at 05:06

## 2018-06-14 RX ADMIN — AMPICILLIN 2 G: 2 INJECTION, POWDER, FOR SOLUTION INTRAVENOUS at 10:06

## 2018-06-14 RX ADMIN — AMPICILLIN 2 G: 2 INJECTION, POWDER, FOR SOLUTION INTRAVENOUS at 04:06

## 2018-06-14 RX ADMIN — AMLODIPINE BESYLATE 10 MG: 10 TABLET ORAL at 08:06

## 2018-06-14 RX ADMIN — RAMELTEON 8 MG: 8 TABLET, FILM COATED ORAL at 08:06

## 2018-06-14 RX ADMIN — DOXAZOSIN MESYLATE 4 MG: 2 TABLET ORAL at 08:06

## 2018-06-14 RX ADMIN — FAMOTIDINE 20 MG: 10 INJECTION, SOLUTION INTRAVENOUS at 08:06

## 2018-06-14 RX ADMIN — Medication 10 ML: at 05:06

## 2018-06-14 RX ADMIN — POTASSIUM CHLORIDE 40 MEQ: 20 SOLUTION ORAL at 04:06

## 2018-06-14 RX ADMIN — POLYETHYLENE GLYCOL 3350 17 G: 17 POWDER, FOR SOLUTION ORAL at 08:06

## 2018-06-14 RX ADMIN — Medication 3 ML: at 01:06

## 2018-06-14 RX ADMIN — OXYCODONE HYDROCHLORIDE 5 MG: 5 TABLET ORAL at 06:06

## 2018-06-14 RX ADMIN — POTASSIUM CHLORIDE 40 MEQ: 20 SOLUTION ORAL at 01:06

## 2018-06-14 RX ADMIN — ATORVASTATIN CALCIUM 40 MG: 20 TABLET, FILM COATED ORAL at 08:06

## 2018-06-14 RX ADMIN — HYDRALAZINE HYDROCHLORIDE 25 MG: 25 TABLET, FILM COATED ORAL at 10:06

## 2018-06-14 RX ADMIN — MIRTAZAPINE 15 MG: 15 TABLET, FILM COATED ORAL at 08:06

## 2018-06-14 RX ADMIN — Medication 10 ML: at 12:06

## 2018-06-14 NOTE — SUBJECTIVE & OBJECTIVE
Interval History: Received 1u PRBC overnight. Doing well this am. Continue careful diuresis.    Medications:  Continuous Infusions:   dextrose 5 % Stopped (06/13/18 0551)     Scheduled Meds:   amiodarone  400 mg Per G Tube BID    Followed by    [START ON 6/20/2018] amiodarone  400 mg Per G Tube Daily    amLODIPine  10 mg Per G Tube Daily    ampicillin IVPB  2 g Intravenous Q6H    aspirin  324 mg Per G Tube Daily    atorvastatin  40 mg Per G Tube Daily    calcium gluconate IVPB  1,000 mg Intravenous Once    cefTRIAXone (ROCEPHIN) IVPB  2 g Intravenous Q12H    doxazosin  4 mg Per G Tube Daily    [START ON 6/15/2018] famotidine  20 mg Per G Tube Daily    hydrALAZINE  25 mg Per G Tube Q8H    mirtazapine  15 mg Per G Tube QHS    oxymetazoline  2 spray Each Nare BID    polyethylene glycol  17 g Per G Tube Daily    ramelteon  8 mg Per G Tube QHS    sodium chloride 0.9%  10 mL Intravenous Q6H    sodium chloride 0.9%  3 mL Intravenous Q8H     PRN Meds:sodium chloride, albuterol-ipratropium, bisacodyl, dextromethorphan-guaifenesin  mg/5 ml, dextrose 50%, docusate, glucagon (human recombinant), hydrALAZINE, insulin aspart U-100, lactated ringers, metoclopramide HCl, morphine, ondansetron, oxyCODONE, sodium chloride, Flushing PICC Protocol **AND** sodium chloride 0.9% **AND** sodium chloride 0.9%, white petrolatum-mineral oil     Objective:     Vital Signs (Most Recent):  Temp: 97.8 °F (36.6 °C) (06/14/18 0700)  Pulse: 81 (06/14/18 0915)  Resp: (!) 26 (06/14/18 0915)  BP: (!) 117/56 (06/14/18 0915)  SpO2: 95 % (06/14/18 0915) Vital Signs (24h Range):  Temp:  [97.8 °F (36.6 °C)-98.3 °F (36.8 °C)] 97.8 °F (36.6 °C)  Pulse:  [69-83] 81  Resp:  [14-47] 26  SpO2:  [81 %-100 %] 95 %  BP: (114-155)/(56-70) 117/56     Weight: 76.9 kg (169 lb 8.5 oz)  Body mass index is 24.33 kg/m².    SpO2: 95 %  O2 Device (Oxygen Therapy): High Flow nasal Cannula    Intake/Output - Last 3 Shifts       06/12 0700 - 06/13 0659  06/13 0700 - 06/14 0659 06/14 0700 - 06/15 0659    P.O. 0      I.V. (mL/kg) 365 (4.8) 328 (4.3)     Blood  471     NG/ 60     IV Piggyback 300 350     Total Intake(mL/kg) 1174 (15.4) 1209 (15.7)     Urine (mL/kg/hr) 1150 (0.6) 2485 (1.3) 500 (2.2)    Drains 805 (0.4) 805 (0.4) 50 (0.2)    Total Output 1955 3290 550    Net -781 -2081 -550                 Lines/Drains/Airways     Peripherally Inserted Central Catheter Line                 PICC Double Lumen 03/05/18 1539 right basilic 100 days         PICC Double Lumen 06/04/18 1654 left brachial 9 days          Central Venous Catheter Line                 Percutaneous Central Line Insertion/Assessment - Quad lumen  05/25/18 0800 left subclavian 20 days          Drain                 Gastrostomy/Enterostomy 06/07/18 1657 Gastrostomy-jejunostomy LUQ 6 days         Urethral Catheter 06/11/18 1711 Latex 2 days          Line                 Pacer Wires 05/25/18 1355 19 days                Physical Exam   Constitutional: He appears well-developed and well-nourished.   Cardiovascular: Normal rate, regular rhythm and normal heart sounds.    Pulmonary/Chest: Effort normal.   HFNC   Abdominal: Soft.   PEG intact   Neurological: He is alert.   Skin: Skin is warm, dry and intact.   Psychiatric: He has a normal mood and affect.       Significant Labs:  CBC:   Recent Labs  Lab 06/14/18  0535   WBC 8.21   RBC 2.60*   HGB 7.9*   HCT 24.8*   *   MCV 95   MCH 30.4   MCHC 31.9*     CMP:   Recent Labs  Lab 06/14/18  0535   *   CALCIUM 8.5*   ALBUMIN 2.3*   PROT 6.1   *   K 3.6   CO2 30*      BUN 53*   CREATININE 1.9*   ALKPHOS 77   ALT 12   AST 21   BILITOT 1.5*     Coagulation:   Recent Labs  Lab 06/14/18  0535   INR 2.6*   APTT 38.0*       Significant Diagnostics:  CXR with worsening hazyness, pulmonary edema

## 2018-06-14 NOTE — PROGRESS NOTES
[facility]  Critical Care - Surgery  Progress Note    Patient Name: Rodolfo Messina  MRN: [id number]  Admission Date: 5/23/2018  Hospital Length of Stay: 22 days  Code Status: Full Code  Attending Provider: Marvin Go MD  Primary Care Provider: Deric Claudio MD   Principal Problem: S/P MVR (mitral valve replacement)    Subjective:     Interval History/Significant Events: No acute events overnight, afebrile, vital signs stable. Pain controlled. UOP adequate. Received 1 unit pRBCs overnight for Hgb 6.4 with response to 7.9 this AM.     Follow-up For: Procedure(s) (LRB):  Mitral Valve Replacement (N/A)  AORTOCORONARY BYPASS-CABG (N/A)    Post-Operative Day: 20 Days Post-Op    Objective:     Vital Signs (Most Recent):  Temp: 98.2 °F (36.8 °C) (06/14/18 0530)  Pulse: 75 (06/14/18 0530)  Resp: (!) 23 (06/14/18 0530)  BP: 130/63 (06/14/18 0530)  SpO2: 99 % (06/14/18 0530) Vital Signs (24h Range):  Temp:  [97.7 °F (36.5 °C)-98.3 °F (36.8 °C)] 98.2 °F (36.8 °C)  Pulse:  [68-82] 75  Resp:  [14-44] 23  SpO2:  [81 %-100 %] 99 %  BP: (114-155)/(57-70) 130/63     Weight: 76.9 kg (169 lb 8.5 oz)  Body mass index is 24.33 kg/m².      Intake/Output Summary (Last 24 hours) at 06/14/18 0622  Last data filed at 06/14/18 0530   Gross per 24 hour   Intake              793 ml   Output             3370 ml   Net            -2577 ml       Physical Exam   Constitutional: He is oriented to person, place, and time. He appears well-developed.   Thin  Wife at bedside   HENT:   Head: Normocephalic and atraumatic.   Eyes: EOM are normal. Pupils are equal, round, and reactive to light.   Neck: Neck supple.   Cardiovascular: Normal rate and regular rhythm.    Pulmonary/Chest: Effort normal. No respiratory distress.   Abdominal: Soft. There is no tenderness.   JG tube noted   Musculoskeletal: Normal range of motion. He exhibits no deformity.   Neurological: He is alert and oriented to person, place, and time. No sensory  deficit. He exhibits normal muscle tone.   -no focal weakness   Skin: Skin is warm and dry.   Psychiatric: He has a normal mood and affect. His behavior is normal.       Vents:  Vent Mode: Spont (05/26/18 2025)  Ventilator Initiated: Yes (05/25/18 1509)  Set Rate: 0 bmp (05/26/18 2025)  Vt Set: 500 mL (05/26/18 2025)  Pressure Support: 10 cmH20 (05/26/18 2025)  PEEP/CPAP: 5 cmH20 (05/26/18 2025)  Oxygen Concentration (%): 24 (05/29/18 0402)  Peak Airway Pressure: 16 cmH2O (05/26/18 2025)  Plateau Pressure: 0 cmH20 (05/26/18 2025)  Total Ve: 11.8 mL (05/26/18 2025)  F/VT Ratio<105 (RSBI): (!) 32.76 (05/26/18 2025)    Lines/Drains/Airways     Peripherally Inserted Central Catheter Line                 PICC Double Lumen 03/05/18 1539 right basilic 100 days         PICC Double Lumen 06/04/18 1654 left brachial 9 days          Central Venous Catheter Line                 Percutaneous Central Line Insertion/Assessment - Quad lumen  05/25/18 0800 left subclavian 19 days          Drain                 Gastrostomy/Enterostomy 06/07/18 1657 Gastrostomy-jejunostomy LUQ 6 days         Urethral Catheter 06/11/18 1711 Latex 2 days          Line                 Pacer Wires 05/25/18 1355 19 days                Significant Labs:    CBC/Anemia Profile:    Recent Labs  Lab 06/13/18  1745 06/14/18  0008 06/14/18  0535   WBC 9.07 7.25 8.21   HGB 8.7* 6.4* 7.9*   HCT 27.1* 20.1* 24.8*    152 147*   MCV 97 96 95   RDW 15.8* 16.6* 16.5*        Chemistries:    Recent Labs  Lab 06/13/18  0423 06/13/18  1140 06/13/18  1745 06/13/18  1835 06/14/18  0008   * 148* 147*  --  150*   K 3.6 3.6 5.7* 3.8 3.7    100 102  --  105   CO2 35* 38* 34*  --  35*   BUN 61* 61* 55*  --  56*   CREATININE 2.0* 2.1* 1.9*  --  1.9*   CALCIUM 8.7 9.0 9.0  --  8.4*   ALBUMIN 2.4* 2.5* 2.4*  --  2.3*   PROT 5.8* 6.0 5.9*  --  5.7*   BILITOT 0.7 0.9 2.0*  --  2.2*   ALKPHOS 78 77 77  --  74   ALT 12 13 12  --  12   AST 20 20 23  --  21   MG  --   2.9* 2.6  --  2.5   PHOS 3.9  --   --   --   --        Coagulation:   Recent Labs  Lab 06/14/18  0535   INR 2.6*   APTT 38.0*       Significant Imaging:  I have reviewed all pertinent imaging results/findings within the past 24 hours.    Assessment/Plan:     Chronic systolic congestive heart failure     68yo M with PMH of mitral valve endocarditis and resultant severe MR, TR and pulmonary HTN who is now s/p IABP placement (5/24) and s/pMVR and CABG 5/25.     Plan:     Neuro:   - AAOx4 this AM.   - PRN oxy via J tube for pain control     Pulmonary:   - Currently on 12L high flow NC O2, wean as tolerated  - Continuous pulse ox.   - ABGs/CXR if distressed.   -monitor CXR     Cardiac:  - MAP goal >65  - Paced at 80  - Home amlodipine, ASA, and statin daily.  - Coumadin therapy.  - Continuous cardiac monitoring.  - Pathology from valve returned with active subacute endocarditis. S/p ID consultation. PICC placed for long term abx administration.  Per ID Ampicillin and Ceftriaxone x 6 weeks for Enterococcal SBE (end date 7/13/18). In addition, patient will need ESR, CRP, CBC and CMP to be drawn weekly with results faxed to the ID Department at [phone] set up prior to discharge. Appreciate sw assistance.       Renal:   -UOP adequate.   -Bun/Cr stable, 56/1.9  -Flomax daily given home use.  - MIVF off     Fluids/Electrolytes/Nutrition/GI:   - Continue holding tube feeds, re-start per primary  - Free water flushes    - d/c D5W gtt  - failed MBSS  - Replace lytes PRN.  - Bowel regimen.     Hematology/Oncology:  - H/H most recently 7.9/24.8 s/p pRBCs for Hgb 6.4 Transfusion per primary team.  - INR daily, 2.6 today  - CBC daily.     Infectious Disease:   -Afebrile  -WBC wnl  -CBC daily   -Pathology from valve with active endocarditis, ID consulted, recommend Ampicillin and Ceftriaxone x 6 weeks for Enterococcal SBE (end date 7/13/18). In addition, patient will need ESR, CRP, CBC and CMP to be drawn weekly with results  faxed to the ID Department at [phone] set up prior to discharge. Appreciate sw assistance.      Endocrine:  - No h/o DM or thyroid dysfunction  - s/p Endocrine evaluation. Continue following recs.   - Glucose goal of 120-180     Dispo:  - Continue ICU care  -[facility] primary                  Critical care was time spent personally by me on the following activities: development of treatment plan with patient or surrogate and bedside caregivers, discussions with consultants, evaluation of patient's response to treatment, examination of patient, ordering and performing treatments and interventions, ordering and review of laboratory studies, ordering and review of radiographic studies, pulse oximetry, re-evaluation of patient's condition.  This critical care time did not overlap with that of any other provider or involve time for any procedures.     Pepper Chadwick MD  Critical Care - Surgery  [facility]

## 2018-06-14 NOTE — ASSESSMENT & PLAN NOTE
68yo M with PMH of mitral valve endocarditis and resultant severe MR, TR and pulmonary HTN who is now s/p IABP placement (5/24) and s/pMVR and CABG 5/25.     Plan:     Neuro:   - AAOx4 this AM.   - PRN oxy via J tube for pain control     Pulmonary:   - Currently on 12L high flow NC O2, wean as tolerated  - Continuous pulse ox.   - ABGs/CXR if distressed.   -monitor CXR     Cardiac:  - MAP goal >65  - Paced at 80  - Home amlodipine, ASA, and statin daily.  - Coumadin therapy.  - Continuous cardiac monitoring.  - Pathology from valve returned with active subacute endocarditis. S/p ID consultation. PICC placed for long term abx administration.  Per ID Ampicillin and Ceftriaxone x 6 weeks for Enterococcal SBE (end date 7/13/18). In addition, patient will need ESR, CRP, CBC and CMP to be drawn weekly with results faxed to the ID Department at [phone] set up prior to discharge. Appreciate sw assistance.       Renal:   -UOP adequate.   -Bun/Cr stable, 56/1.9  -Flomax daily given home use.  - MIVF off     Fluids/Electrolytes/Nutrition/GI:   - Continue holding tube feeds, re-start per primary  - Free water flushes    - d/c D5W gtt  - failed MBSS  - Replace lytes PRN.  - Bowel regimen.     Hematology/Oncology:  - H/H most recently 7.9/24.8 s/p pRBCs for Hgb 6.4 Transfusion per primary team.  - INR daily, 2.6 today  - CBC daily.     Infectious Disease:   -Afebrile  -WBC wnl  -CBC daily   -Pathology from valve with active endocarditis, ID consulted, recommend Ampicillin and Ceftriaxone x 6 weeks for Enterococcal SBE (end date 7/13/18). In addition, patient will need ESR, CRP, CBC and CMP to be drawn weekly with results faxed to the ID Department at [phone] set up prior to discharge. Appreciate sw assistance.      Endocrine:  - No h/o DM or thyroid dysfunction  - s/p Endocrine evaluation. Continue following recs.   - Glucose goal of 120-180     Dispo:  - Continue ICU care  -CTS primary

## 2018-06-14 NOTE — SUBJECTIVE & OBJECTIVE
Interval History/Significant Events: No acute events overnight, afebrile, vital signs stable. Pain controlled. UOP adequate. Received 1 unit pRBCs overnight for Hgb 6.4 with response to 7.9 this AM.     Follow-up For: Procedure(s) (LRB):  Mitral Valve Replacement (N/A)  AORTOCORONARY BYPASS-CABG (N/A)    Post-Operative Day: 20 Days Post-Op    Objective:     Vital Signs (Most Recent):  Temp: 98.2 °F (36.8 °C) (06/14/18 0530)  Pulse: 75 (06/14/18 0530)  Resp: (!) 23 (06/14/18 0530)  BP: 130/63 (06/14/18 0530)  SpO2: 99 % (06/14/18 0530) Vital Signs (24h Range):  Temp:  [97.7 °F (36.5 °C)-98.3 °F (36.8 °C)] 98.2 °F (36.8 °C)  Pulse:  [68-82] 75  Resp:  [14-44] 23  SpO2:  [81 %-100 %] 99 %  BP: (114-155)/(57-70) 130/63     Weight: 76.9 kg (169 lb 8.5 oz)  Body mass index is 24.33 kg/m².      Intake/Output Summary (Last 24 hours) at 06/14/18 0622  Last data filed at 06/14/18 0530   Gross per 24 hour   Intake              793 ml   Output             3370 ml   Net            -2577 ml       Physical Exam   Constitutional: He is oriented to person, place, and time. He appears well-developed.   Thin  Wife at bedside   HENT:   Head: Normocephalic and atraumatic.   Eyes: EOM are normal. Pupils are equal, round, and reactive to light.   Neck: Neck supple.   Cardiovascular: Normal rate and regular rhythm.    Pulmonary/Chest: Effort normal. No respiratory distress.   Abdominal: Soft. There is no tenderness.   JG tube noted   Musculoskeletal: Normal range of motion. He exhibits no deformity.   Neurological: He is alert and oriented to person, place, and time. No sensory deficit. He exhibits normal muscle tone.   -no focal weakness   Skin: Skin is warm and dry.   Psychiatric: He has a normal mood and affect. His behavior is normal.       Vents:  Vent Mode: Spont (05/26/18 2025)  Ventilator Initiated: Yes (05/25/18 1509)  Set Rate: 0 bmp (05/26/18 2025)  Vt Set: 500 mL (05/26/18 2025)  Pressure Support: 10 cmH20 (05/26/18  2025)  PEEP/CPAP: 5 cmH20 (05/26/18 2025)  Oxygen Concentration (%): 24 (05/29/18 0402)  Peak Airway Pressure: 16 cmH2O (05/26/18 2025)  Plateau Pressure: 0 cmH20 (05/26/18 2025)  Total Ve: 11.8 mL (05/26/18 2025)  F/VT Ratio<105 (RSBI): (!) 32.76 (05/26/18 2025)    Lines/Drains/Airways     Peripherally Inserted Central Catheter Line                 PICC Double Lumen 03/05/18 1539 right basilic 100 days         PICC Double Lumen 06/04/18 1654 left brachial 9 days          Central Venous Catheter Line                 Percutaneous Central Line Insertion/Assessment - Quad lumen  05/25/18 0800 left subclavian 19 days          Drain                 Gastrostomy/Enterostomy 06/07/18 1657 Gastrostomy-jejunostomy LUQ 6 days         Urethral Catheter 06/11/18 1711 Latex 2 days          Line                 Pacer Wires 05/25/18 1355 19 days                Significant Labs:    CBC/Anemia Profile:    Recent Labs  Lab 06/13/18  1745 06/14/18  0008 06/14/18  0535   WBC 9.07 7.25 8.21   HGB 8.7* 6.4* 7.9*   HCT 27.1* 20.1* 24.8*    152 147*   MCV 97 96 95   RDW 15.8* 16.6* 16.5*        Chemistries:    Recent Labs  Lab 06/13/18  0423 06/13/18  1140 06/13/18  1745 06/13/18  1835 06/14/18  0008   * 148* 147*  --  150*   K 3.6 3.6 5.7* 3.8 3.7    100 102  --  105   CO2 35* 38* 34*  --  35*   BUN 61* 61* 55*  --  56*   CREATININE 2.0* 2.1* 1.9*  --  1.9*   CALCIUM 8.7 9.0 9.0  --  8.4*   ALBUMIN 2.4* 2.5* 2.4*  --  2.3*   PROT 5.8* 6.0 5.9*  --  5.7*   BILITOT 0.7 0.9 2.0*  --  2.2*   ALKPHOS 78 77 77  --  74   ALT 12 13 12  --  12   AST 20 20 23  --  21   MG  --  2.9* 2.6  --  2.5   PHOS 3.9  --   --   --   --        Coagulation:   Recent Labs  Lab 06/14/18  0535   INR 2.6*   APTT 38.0*       Significant Imaging:  I have reviewed all pertinent imaging results/findings within the past 24 hours.

## 2018-06-14 NOTE — PROGRESS NOTES
[facility]  Cardiothoracic Surgery  Progress Note    Patient Name: Rodolfo Messina  MRN: [id number]  Admission Date: 5/23/2018  Hospital Length of Stay: 22 days  Code Status: Full Code   Attending Physician: Marvin Go MD   Referring Provider: Marvin Go MD  Principal Problem:S/P MVR (mitral valve replacement)    Subjective:     Post-Op Info:  Procedure(s) (LRB):  Mitral Valve Replacement (N/A)  AORTOCORONARY BYPASS-CABG (N/A)   20 Days Post-Op     Interval History: Received 1u PRBC overnight. Doing well this am. Continue careful diuresis.    Medications:  Continuous Infusions:   dextrose 5 % Stopped (06/13/18 0551)     Scheduled Meds:   amiodarone  400 mg Per G Tube BID    Followed by    [START ON 6/20/2018] amiodarone  400 mg Per G Tube Daily    amLODIPine  10 mg Per G Tube Daily    ampicillin IVPB  2 g Intravenous Q6H    aspirin  324 mg Per G Tube Daily    atorvastatin  40 mg Per G Tube Daily    calcium gluconate IVPB  1,000 mg Intravenous Once    cefTRIAXone (ROCEPHIN) IVPB  2 g Intravenous Q12H    doxazosin  4 mg Per G Tube Daily    [START ON 6/15/2018] famotidine  20 mg Per G Tube Daily    hydrALAZINE  25 mg Per G Tube Q8H    mirtazapine  15 mg Per G Tube QHS    oxymetazoline  2 spray Each Nare BID    polyethylene glycol  17 g Per G Tube Daily    ramelteon  8 mg Per G Tube QHS    sodium chloride 0.9%  10 mL Intravenous Q6H    sodium chloride 0.9%  3 mL Intravenous Q8H     PRN Meds:sodium chloride, albuterol-ipratropium, bisacodyl, dextromethorphan-guaifenesin  mg/5 ml, dextrose 50%, docusate, glucagon (human recombinant), hydrALAZINE, insulin aspart U-100, lactated ringers, metoclopramide HCl, morphine, ondansetron, oxyCODONE, sodium chloride, Flushing PICC Protocol **AND** sodium chloride 0.9% **AND** sodium chloride 0.9%, white petrolatum-mineral oil     Objective:     Vital Signs (Most Recent):  Temp: 97.8 °F (36.6 °C) (06/14/18 0700)  Pulse: 81 (06/14/18  0915)  Resp: (!) 26 (06/14/18 0915)  BP: (!) 117/56 (06/14/18 0915)  SpO2: 95 % (06/14/18 0915) Vital Signs (24h Range):  Temp:  [97.8 °F (36.6 °C)-98.3 °F (36.8 °C)] 97.8 °F (36.6 °C)  Pulse:  [69-83] 81  Resp:  [14-47] 26  SpO2:  [81 %-100 %] 95 %  BP: (114-155)/(56-70) 117/56     Weight: 76.9 kg (169 lb 8.5 oz)  Body mass index is 24.33 kg/m².    SpO2: 95 %  O2 Device (Oxygen Therapy): High Flow nasal Cannula    Intake/Output - Last 3 Shifts       06/12 0700 - 06/13 0659 06/13 0700 - 06/14 0659 06/14 0700 - 06/15 0659    P.O. 0      I.V. (mL/kg) 365 (4.8) 328 (4.3)     Blood  471     NG/ 60     IV Piggyback 300 350     Total Intake(mL/kg) 1174 (15.4) 1209 (15.7)     Urine (mL/kg/hr) 1150 (0.6) 2485 (1.3) 500 (2.2)    Drains 805 (0.4) 805 (0.4) 50 (0.2)    Total Output 1955 3290 550    Net -781 -2081 -550                 Lines/Drains/Airways     Peripherally Inserted Central Catheter Line                 PICC Double Lumen 03/05/18 1539 right basilic 100 days         PICC Double Lumen 06/04/18 1654 left brachial 9 days          Central Venous Catheter Line                 Percutaneous Central Line Insertion/Assessment - Quad lumen  05/25/18 0800 left subclavian 20 days          Drain                 Gastrostomy/Enterostomy 06/07/18 1657 Gastrostomy-jejunostomy LUQ 6 days         Urethral Catheter 06/11/18 1711 Latex 2 days          Line                 Pacer Wires 05/25/18 1355 19 days                Physical Exam   Constitutional: He appears well-developed and well-nourished.   Cardiovascular: Normal rate, regular rhythm and normal heart sounds.    Pulmonary/Chest: Effort normal.   HFNC   Abdominal: Soft.   PEG intact   Neurological: He is alert.   Skin: Skin is warm, dry and intact.   Psychiatric: He has a normal mood and affect.       Significant Labs:  CBC:   Recent Labs  Lab 06/14/18  0535   WBC 8.21   RBC 2.60*   HGB 7.9*   HCT 24.8*   *   MCV 95   MCH 30.4   MCHC 31.9*     CMP:   Recent  Labs  Lab 06/14/18  0535   *   CALCIUM 8.5*   ALBUMIN 2.3*   PROT 6.1   *   K 3.6   CO2 30*      BUN 53*   CREATININE 1.9*   ALKPHOS 77   ALT 12   AST 21   BILITOT 1.5*     Coagulation:   Recent Labs  Lab 06/14/18  0535   INR 2.6*   APTT 38.0*       Significant Diagnostics:  CXR with worsening hazyness, pulmonary edema    Assessment/Plan:     * S/P MVR (mitral valve replacement)    --Continue ASA, statin (holding BB)  --ENT consult for epistaxis; concern for aspiration of old blood clots from sinuses   --HFNC satting well, wean as tolerated for O2 sat >92%  --Diuril 250 once; monitor response and dose as needed  --1u PRBC given; monitor H/H  --Sternal precautions  --PT/OT  --Ambulate x4  --monitor CXR    --Monitor electrolytes and replace prn  --Coumadin hold today  -- Q6  --continue  lackey   --strict I&Os  --did not pass modified swallow; PICC  --G-J tube placed; TF restarted        DISPO: Continue ICU care, possible stepdown tomorrow; discharge planning on going for rehab           Dysphagia    --SLP following  --failed last BMS study   --GJ tube placed by IR 6/7        Impaired functional mobility and endurance    --discharging to rehab  --PT/OT daily         On enteral nutrition    --TFs at goal; held for emesis of blood clots        Epistaxis    --Noted to have post traumatic left sided epistaxis following attempted NGT placement. On the time of evaluation patient was hemostatic. No active bleeding noted.   -ENT following  -Given that patient is hemostatic, no acute intervention   -Recommend minimize nasal trauma as best possible  -If requires oxygen, ideal if it is humidified via face tent and not nasal canula  -Bacitracin to bilateral nares nightly  -Afrin BID x 3 days  -Ocean nasal spray q6hrs while awake  -If bleeding restarts, hold pressure to fleshy portion of nose (10 mins) and lean forward  -For bleeding that does not stop, page resident  -Remainder of medical management per  primary team        Atrial flutter    --EP following appreciate recs., will reach out to them- pt currently in Afib that began 6/6- Amio IV load and infusion, now on PO amio  --holding BB        S/P CABG x 1    --see MVR         Chronic systolic congestive heart failure    - ASA, statin  - D/c beta blocker  - EP following; appreciate recs          ANNA (acute kidney injury)    --Elevated Cr  --holding Stuart Hawkins MD  Cardiothoracic Surgery  [facility]

## 2018-06-14 NOTE — PROGRESS NOTES
Pt AAOx4. Opens eyes spontaneously. Nods/gestures appropriately. Following all commands. Moving all extremities freely/equally. Denies pain. Pt on 12L HFNC, O2 Sats >90%. Plan of care reviewed with pt and spouse. Questions answered per ICU RN. See flowsheet for details.

## 2018-06-14 NOTE — ASSESSMENT & PLAN NOTE
--Continue ASA, statin (holding BB)  --ENT consult for epistaxis; concern for aspiration of old blood clots from sinuses   --HFNC satting well, wean as tolerated for O2 sat >92%  --Diuril 250 once; monitor response and dose as needed  --1u PRBC given; monitor H/H  --Sternal precautions  --PT/OT  --Ambulate x4  --monitor CXR    --Monitor electrolytes and replace prn  --Coumadin hold today  -- Q6  --continue  lackey   --strict I&Os  --did not pass modified swallow; PICC  --G-J tube placed; TF restarted        DISPO: Continue ICU care, possible stepdown tomorrow; discharge planning on going for rehab

## 2018-06-14 NOTE — NURSING
Dr. Bonilla called, states to continue to hold tube feeding tonight and reassess in AM. Patient and wife updated.

## 2018-06-15 LAB
ALBUMIN SERPL BCP-MCNC: 2.3 G/DL
ALBUMIN SERPL BCP-MCNC: 2.4 G/DL
ALBUMIN SERPL BCP-MCNC: 2.6 G/DL
ALBUMIN SERPL BCP-MCNC: 2.6 G/DL
ALP SERPL-CCNC: 78 U/L
ALP SERPL-CCNC: 79 U/L
ALP SERPL-CCNC: 82 U/L
ALP SERPL-CCNC: 84 U/L
ALT SERPL W/O P-5'-P-CCNC: 13 U/L
ALT SERPL W/O P-5'-P-CCNC: 13 U/L
ALT SERPL W/O P-5'-P-CCNC: 14 U/L
ALT SERPL W/O P-5'-P-CCNC: 14 U/L
ANION GAP SERPL CALC-SCNC: 10 MMOL/L
ANION GAP SERPL CALC-SCNC: 11 MMOL/L
ANION GAP SERPL CALC-SCNC: 12 MMOL/L
ANION GAP SERPL CALC-SCNC: 9 MMOL/L
APTT BLDCRRT: 43.6 SEC
APTT BLDCRRT: 44.8 SEC
APTT BLDCRRT: 46.4 SEC
APTT BLDCRRT: 47 SEC
AST SERPL-CCNC: 20 U/L
BASOPHILS # BLD AUTO: 0.01 K/UL
BASOPHILS # BLD AUTO: 0.02 K/UL
BASOPHILS NFR BLD: 0.2 %
BASOPHILS NFR BLD: 0.3 %
BILIRUB SERPL-MCNC: 1.4 MG/DL
BILIRUB SERPL-MCNC: 1.6 MG/DL
BILIRUB SERPL-MCNC: 1.7 MG/DL
BILIRUB SERPL-MCNC: 1.9 MG/DL
BUN SERPL-MCNC: 55 MG/DL
BUN SERPL-MCNC: 56 MG/DL
BUN SERPL-MCNC: 56 MG/DL
BUN SERPL-MCNC: 59 MG/DL
CALCIUM SERPL-MCNC: 9.4 MG/DL
CALCIUM SERPL-MCNC: 9.6 MG/DL
CALCIUM SERPL-MCNC: 9.8 MG/DL
CALCIUM SERPL-MCNC: 9.9 MG/DL
CHLORIDE SERPL-SCNC: 109 MMOL/L
CHLORIDE SERPL-SCNC: 109 MMOL/L
CHLORIDE SERPL-SCNC: 110 MMOL/L
CHLORIDE SERPL-SCNC: 111 MMOL/L
CO2 SERPL-SCNC: 28 MMOL/L
CO2 SERPL-SCNC: 29 MMOL/L
CO2 SERPL-SCNC: 30 MMOL/L
CO2 SERPL-SCNC: 31 MMOL/L
CREAT SERPL-MCNC: 1.9 MG/DL
CREAT SERPL-MCNC: 1.9 MG/DL
CREAT SERPL-MCNC: 2 MG/DL
CREAT SERPL-MCNC: 2 MG/DL
DIFFERENTIAL METHOD: ABNORMAL
EOSINOPHIL # BLD AUTO: 0.1 K/UL
EOSINOPHIL # BLD AUTO: 0.2 K/UL
EOSINOPHIL # BLD AUTO: 0.3 K/UL
EOSINOPHIL NFR BLD: 2.3 %
EOSINOPHIL NFR BLD: 2.9 %
EOSINOPHIL NFR BLD: 3.5 %
EOSINOPHIL NFR BLD: 3.8 %
EOSINOPHIL NFR BLD: 4.6 %
ERYTHROCYTE [DISTWIDTH] IN BLOOD BY AUTOMATED COUNT: 16.1 %
ERYTHROCYTE [DISTWIDTH] IN BLOOD BY AUTOMATED COUNT: 16.1 %
ERYTHROCYTE [DISTWIDTH] IN BLOOD BY AUTOMATED COUNT: 16.2 %
ERYTHROCYTE [DISTWIDTH] IN BLOOD BY AUTOMATED COUNT: 16.3 %
ERYTHROCYTE [DISTWIDTH] IN BLOOD BY AUTOMATED COUNT: 16.4 %
EST. GFR  (AFRICAN AMERICAN): 38.5 ML/MIN/1.73 M^2
EST. GFR  (AFRICAN AMERICAN): 38.5 ML/MIN/1.73 M^2
EST. GFR  (AFRICAN AMERICAN): 41 ML/MIN/1.73 M^2
EST. GFR  (AFRICAN AMERICAN): 41 ML/MIN/1.73 M^2
EST. GFR  (NON AFRICAN AMERICAN): 33.3 ML/MIN/1.73 M^2
EST. GFR  (NON AFRICAN AMERICAN): 33.3 ML/MIN/1.73 M^2
EST. GFR  (NON AFRICAN AMERICAN): 35.4 ML/MIN/1.73 M^2
EST. GFR  (NON AFRICAN AMERICAN): 35.4 ML/MIN/1.73 M^2
GLUCOSE SERPL-MCNC: 119 MG/DL
GLUCOSE SERPL-MCNC: 120 MG/DL
GLUCOSE SERPL-MCNC: 122 MG/DL
GLUCOSE SERPL-MCNC: 122 MG/DL
HCT VFR BLD AUTO: 26.3 %
HCT VFR BLD AUTO: 26.8 %
HCT VFR BLD AUTO: 27.4 %
HCT VFR BLD AUTO: 27.5 %
HCT VFR BLD AUTO: 27.5 %
HGB BLD-MCNC: 8.1 G/DL
HGB BLD-MCNC: 8.2 G/DL
HGB BLD-MCNC: 8.6 G/DL
HGB BLD-MCNC: 8.6 G/DL
HGB BLD-MCNC: 8.8 G/DL
IMM GRANULOCYTES # BLD AUTO: 0.02 K/UL
IMM GRANULOCYTES # BLD AUTO: 0.03 K/UL
IMM GRANULOCYTES NFR BLD AUTO: 0.3 %
IMM GRANULOCYTES NFR BLD AUTO: 0.5 %
INR PPP: 3.9
LYMPHOCYTES # BLD AUTO: 0.4 K/UL
LYMPHOCYTES # BLD AUTO: 0.5 K/UL
LYMPHOCYTES # BLD AUTO: 0.7 K/UL
LYMPHOCYTES NFR BLD: 6.4 %
LYMPHOCYTES NFR BLD: 6.7 %
LYMPHOCYTES NFR BLD: 7.4 %
LYMPHOCYTES NFR BLD: 7.8 %
LYMPHOCYTES NFR BLD: 9.7 %
MAGNESIUM SERPL-MCNC: 2.1 MG/DL
MAGNESIUM SERPL-MCNC: 2.4 MG/DL
MAGNESIUM SERPL-MCNC: 2.5 MG/DL
MCH RBC QN AUTO: 29.8 PG
MCH RBC QN AUTO: 29.9 PG
MCH RBC QN AUTO: 30.1 PG
MCH RBC QN AUTO: 30.2 PG
MCH RBC QN AUTO: 30.8 PG
MCHC RBC AUTO-ENTMCNC: 30.6 G/DL
MCHC RBC AUTO-ENTMCNC: 30.8 G/DL
MCHC RBC AUTO-ENTMCNC: 31.3 G/DL
MCHC RBC AUTO-ENTMCNC: 31.3 G/DL
MCHC RBC AUTO-ENTMCNC: 32.1 G/DL
MCV RBC AUTO: 96 FL
MCV RBC AUTO: 96 FL
MCV RBC AUTO: 97 FL
MCV RBC AUTO: 97 FL
MCV RBC AUTO: 98 FL
MONOCYTES # BLD AUTO: 0.4 K/UL
MONOCYTES # BLD AUTO: 0.5 K/UL
MONOCYTES # BLD AUTO: 0.5 K/UL
MONOCYTES # BLD AUTO: 0.6 K/UL
MONOCYTES # BLD AUTO: 0.6 K/UL
MONOCYTES NFR BLD: 7.5 %
MONOCYTES NFR BLD: 7.5 %
MONOCYTES NFR BLD: 7.8 %
MONOCYTES NFR BLD: 9.3 %
MONOCYTES NFR BLD: 9.7 %
NEUTROPHILS # BLD AUTO: 4.6 K/UL
NEUTROPHILS # BLD AUTO: 4.7 K/UL
NEUTROPHILS # BLD AUTO: 4.8 K/UL
NEUTROPHILS # BLD AUTO: 5.1 K/UL
NEUTROPHILS # BLD AUTO: 5.4 K/UL
NEUTROPHILS NFR BLD: 77.6 %
NEUTROPHILS NFR BLD: 80.1 %
NEUTROPHILS NFR BLD: 80.4 %
NEUTROPHILS NFR BLD: 80.4 %
NEUTROPHILS NFR BLD: 81.8 %
NRBC BLD-RTO: 0 /100 WBC
PHOSPHATE SERPL-MCNC: 3.8 MG/DL
PLATELET # BLD AUTO: 159 K/UL
PLATELET # BLD AUTO: 161 K/UL
PLATELET # BLD AUTO: 162 K/UL
PLATELET # BLD AUTO: 168 K/UL
PLATELET # BLD AUTO: 178 K/UL
PMV BLD AUTO: 12 FL
PMV BLD AUTO: 12 FL
PMV BLD AUTO: 12.1 FL
PMV BLD AUTO: 12.1 FL
PMV BLD AUTO: 12.5 FL
POCT GLUCOSE: 114 MG/DL (ref 70–110)
POCT GLUCOSE: 122 MG/DL (ref 70–110)
POCT GLUCOSE: 127 MG/DL (ref 70–110)
POCT GLUCOSE: 131 MG/DL (ref 70–110)
POCT GLUCOSE: 168 MG/DL (ref 70–110)
POTASSIUM SERPL-SCNC: 4.1 MMOL/L
POTASSIUM SERPL-SCNC: 4.1 MMOL/L
POTASSIUM SERPL-SCNC: 4.3 MMOL/L
POTASSIUM SERPL-SCNC: 4.5 MMOL/L
PROT SERPL-MCNC: 6.1 G/DL
PROT SERPL-MCNC: 6.2 G/DL
PROT SERPL-MCNC: 6.6 G/DL
PROT SERPL-MCNC: 6.7 G/DL
PROTHROMBIN TIME: 38.3 SEC
RBC # BLD AUTO: 2.71 M/UL
RBC # BLD AUTO: 2.75 M/UL
RBC # BLD AUTO: 2.85 M/UL
RBC # BLD AUTO: 2.86 M/UL
RBC # BLD AUTO: 2.86 M/UL
SODIUM SERPL-SCNC: 149 MMOL/L
SODIUM SERPL-SCNC: 149 MMOL/L
SODIUM SERPL-SCNC: 150 MMOL/L
SODIUM SERPL-SCNC: 151 MMOL/L
WBC # BLD AUTO: 5.74 K/UL
WBC # BLD AUTO: 5.77 K/UL
WBC # BLD AUTO: 5.97 K/UL
WBC # BLD AUTO: 6.27 K/UL
WBC # BLD AUTO: 6.92 K/UL

## 2018-06-15 PROCEDURE — 25000003 PHARM REV CODE 250: Performed by: THORACIC SURGERY (CARDIOTHORACIC VASCULAR SURGERY)

## 2018-06-15 PROCEDURE — 25000003 PHARM REV CODE 250: Performed by: STUDENT IN AN ORGANIZED HEALTH CARE EDUCATION/TRAINING PROGRAM

## 2018-06-15 PROCEDURE — 63600175 PHARM REV CODE 636 W HCPCS: Performed by: NURSE PRACTITIONER

## 2018-06-15 PROCEDURE — 94761 N-INVAS EAR/PLS OXIMETRY MLT: CPT

## 2018-06-15 PROCEDURE — 85730 THROMBOPLASTIN TIME PARTIAL: CPT | Mod: 91

## 2018-06-15 PROCEDURE — 63600175 PHARM REV CODE 636 W HCPCS: Performed by: PHYSICIAN ASSISTANT

## 2018-06-15 PROCEDURE — 85730 THROMBOPLASTIN TIME PARTIAL: CPT

## 2018-06-15 PROCEDURE — G8996 SWALLOW CURRENT STATUS: HCPCS | Mod: CM

## 2018-06-15 PROCEDURE — 63600175 PHARM REV CODE 636 W HCPCS: Performed by: STUDENT IN AN ORGANIZED HEALTH CARE EDUCATION/TRAINING PROGRAM

## 2018-06-15 PROCEDURE — 20000000 HC ICU ROOM

## 2018-06-15 PROCEDURE — 99233 SBSQ HOSP IP/OBS HIGH 50: CPT | Mod: ,,, | Performed by: SURGERY

## 2018-06-15 PROCEDURE — 85610 PROTHROMBIN TIME: CPT

## 2018-06-15 PROCEDURE — 25000003 PHARM REV CODE 250: Performed by: NURSE PRACTITIONER

## 2018-06-15 PROCEDURE — 83735 ASSAY OF MAGNESIUM: CPT | Mod: 91

## 2018-06-15 PROCEDURE — 99223 1ST HOSP IP/OBS HIGH 75: CPT | Mod: ,,, | Performed by: INTERNAL MEDICINE

## 2018-06-15 PROCEDURE — 80053 COMPREHEN METABOLIC PANEL: CPT | Mod: 91

## 2018-06-15 PROCEDURE — 94799 UNLISTED PULMONARY SVC/PX: CPT

## 2018-06-15 PROCEDURE — G8997 SWALLOW GOAL STATUS: HCPCS | Mod: CL

## 2018-06-15 PROCEDURE — 85025 COMPLETE CBC W/AUTO DIFF WBC: CPT

## 2018-06-15 PROCEDURE — A4216 STERILE WATER/SALINE, 10 ML: HCPCS | Performed by: THORACIC SURGERY (CARDIOTHORACIC VASCULAR SURGERY)

## 2018-06-15 PROCEDURE — 25000003 PHARM REV CODE 250: Performed by: PHYSICIAN ASSISTANT

## 2018-06-15 PROCEDURE — 92610 EVALUATE SWALLOWING FUNCTION: CPT

## 2018-06-15 PROCEDURE — 27000221 HC OXYGEN, UP TO 24 HOURS

## 2018-06-15 PROCEDURE — 84100 ASSAY OF PHOSPHORUS: CPT

## 2018-06-15 RX ORDER — FUROSEMIDE 10 MG/ML
40 INJECTION INTRAMUSCULAR; INTRAVENOUS ONCE
Status: COMPLETED | OUTPATIENT
Start: 2018-06-15 | End: 2018-06-15

## 2018-06-15 RX ADMIN — Medication 3 ML: at 02:06

## 2018-06-15 RX ADMIN — AMPICILLIN 2 G: 2 INJECTION, POWDER, FOR SOLUTION INTRAVENOUS at 04:06

## 2018-06-15 RX ADMIN — CHLOROTHIAZIDE SODIUM 500 MG: 500 INJECTION, POWDER, LYOPHILIZED, FOR SOLUTION INTRAVENOUS at 08:06

## 2018-06-15 RX ADMIN — Medication 10 ML: at 12:06

## 2018-06-15 RX ADMIN — CHLOROTHIAZIDE SODIUM 250 MG: 500 INJECTION, POWDER, LYOPHILIZED, FOR SOLUTION INTRAVENOUS at 02:06

## 2018-06-15 RX ADMIN — MIRTAZAPINE 15 MG: 15 TABLET, FILM COATED ORAL at 09:06

## 2018-06-15 RX ADMIN — AMIODARONE HYDROCHLORIDE 400 MG: 200 TABLET ORAL at 09:06

## 2018-06-15 RX ADMIN — CEFTRIAXONE SODIUM 2 G: 2 INJECTION, POWDER, FOR SOLUTION INTRAMUSCULAR; INTRAVENOUS at 11:06

## 2018-06-15 RX ADMIN — Medication 3 ML: at 09:06

## 2018-06-15 RX ADMIN — POLYETHYLENE GLYCOL 3350 17 G: 17 POWDER, FOR SOLUTION ORAL at 08:06

## 2018-06-15 RX ADMIN — FAMOTIDINE 20 MG: 20 TABLET ORAL at 08:06

## 2018-06-15 RX ADMIN — OXYCODONE HYDROCHLORIDE 5 MG: 5 TABLET ORAL at 01:06

## 2018-06-15 RX ADMIN — ATORVASTATIN CALCIUM 40 MG: 20 TABLET, FILM COATED ORAL at 08:06

## 2018-06-15 RX ADMIN — AMIODARONE HYDROCHLORIDE 400 MG: 200 TABLET ORAL at 08:06

## 2018-06-15 RX ADMIN — HYDRALAZINE HYDROCHLORIDE 25 MG: 25 TABLET, FILM COATED ORAL at 01:06

## 2018-06-15 RX ADMIN — HYDRALAZINE HYDROCHLORIDE 25 MG: 25 TABLET, FILM COATED ORAL at 06:06

## 2018-06-15 RX ADMIN — AMPICILLIN 2 G: 2 INJECTION, POWDER, FOR SOLUTION INTRAVENOUS at 10:06

## 2018-06-15 RX ADMIN — ASPIRIN 81 MG CHEWABLE TABLET 324 MG: 81 TABLET CHEWABLE at 08:06

## 2018-06-15 RX ADMIN — CEFTRIAXONE SODIUM 2 G: 2 INJECTION, POWDER, FOR SOLUTION INTRAMUSCULAR; INTRAVENOUS at 12:06

## 2018-06-15 RX ADMIN — AMPICILLIN 2 G: 2 INJECTION, POWDER, FOR SOLUTION INTRAVENOUS at 05:06

## 2018-06-15 RX ADMIN — HYDRALAZINE HYDROCHLORIDE 25 MG: 25 TABLET, FILM COATED ORAL at 09:06

## 2018-06-15 RX ADMIN — AMLODIPINE BESYLATE 10 MG: 10 TABLET ORAL at 08:06

## 2018-06-15 RX ADMIN — RAMELTEON 8 MG: 8 TABLET, FILM COATED ORAL at 09:06

## 2018-06-15 RX ADMIN — FUROSEMIDE 40 MG: 10 INJECTION, SOLUTION INTRAMUSCULAR; INTRAVENOUS at 10:06

## 2018-06-15 RX ADMIN — Medication 10 ML: at 06:06

## 2018-06-15 RX ADMIN — INSULIN ASPART 1 UNITS: 100 INJECTION, SOLUTION INTRAVENOUS; SUBCUTANEOUS at 11:06

## 2018-06-15 RX ADMIN — DOXAZOSIN MESYLATE 4 MG: 2 TABLET ORAL at 08:06

## 2018-06-15 NOTE — PLAN OF CARE
Problem: SLP Goal  Goal: SLP Goal  Speech Language Pathology Goals  Goals expected to be met by 6/22/2018  1. Pt will to tolerate 1/2 tsp bites puree while completing 2-3 swallows per bolus w/o overt S/S aspiration, MIN A  2. Pt will tolerate trials of tsp sips honey thickened liquids while completing 2-3 swallows per bolus w/o overt S/S aspiration, MIN A   3. Educate Pt and family on safe swallow strategies and S/S aspiration   4. Continue to assess swallow to determine feasibility of re-initiation of pleasure feeds   5. Pt will complete dysphagia exercises given min A    Goals expected to be met by 6/11 -unable to meet prior to transfer to ICU  1. Pt will tolerate puree & honey thickened liquids for pleasure while completing 2-3 swallows per bolus w/o overt S/S aspiration, MIN A   3. Educate Pt and family on safe swallow strategies and S/S aspiration   4. Continue to assess swallow to determine feasibility of PO upgrade   5. Pt will complete dysphagia exercises given min A      Speech Language Pathology Goals  Goals expected to be met by 6/7:  1. Pt will tolerate trials of puree w/o overt S/S aspiration, MIN A - CONTINUE  2. Pt will tolerate trials of nectar-thickened liquids w/o overt S/S aspiration, MIN A - DISCONTINUE  3. Educate Pt and family on safe swallow strategies and S/S aspiration - CONTINUE  4. Continue to assess swallow to determine feasibility of PO upgrade - CONTINUE      Goals expected to be met by 6/4: goals not met 2/2 transfer to ICU 5/30  1. Pt will participate in ongoing swallowing assessment to determine safest and least restrictive diet.          Outcome: Ongoing (interventions implemented as appropriate)  Bedside Swallow Reassessment completed.  Pt with immediate coughing/choking on trials presented at the bedside. REC: continue NPO with alternative means nutrition/hydration/medication, frequent oral care, and ongoing ST to continue to assess safety/feasibility of re-initiation of PO diet.  Please see note for full details. Thank you.    CAREY Salgado., [facility]  Speech-Language Pathology   Pager: [phone]  .d

## 2018-06-15 NOTE — SUBJECTIVE & OBJECTIVE
Interval History/Significant Events: No acute events overnight, afebrile, vitals stable. Currently maintaining sats on 8L facemask, switched from NC overnight given mouth breathing. UOP excellent yesterday, net negative ~1.2L.  CXR still with patchy airspace disease, most significant in right middle and lower lung. INR elevated at 3.9 today.       Follow-up For: Procedure(s) (LRB):  Mitral Valve Replacement (N/A)  AORTOCORONARY BYPASS-CABG (N/A)    Post-Operative Day: 21 Days Post-Op    Objective:     Vital Signs (Most Recent):  Temp: 98 °F (36.7 °C) (06/15/18 0300)  Pulse: 81 (06/15/18 0600)  Resp: (!) 23 (06/15/18 0600)  BP: 135/63 (06/15/18 0600)  SpO2: 97 % (06/15/18 0600) Vital Signs (24h Range):  Temp:  [97.5 °F (36.4 °C)-98 °F (36.7 °C)] 98 °F (36.7 °C)  Pulse:  [74-86] 81  Resp:  [15-44] 23  SpO2:  [87 %-99 %] 97 %  BP: (103-135)/(50-80) 135/63     Weight: 76.9 kg (169 lb 8.5 oz)  Body mass index is 24.33 kg/m².      Intake/Output Summary (Last 24 hours) at 06/15/18 0650  Last data filed at 06/15/18 0600   Gross per 24 hour   Intake             1195 ml   Output             2580 ml   Net            -1385 ml       Physical Exam    Vents:  Vent Mode: Spont (05/26/18 2025)  Ventilator Initiated: Yes (05/25/18 1509)  Set Rate: 0 bmp (05/26/18 2025)  Vt Set: 500 mL (05/26/18 2025)  Pressure Support: 10 cmH20 (05/26/18 2025)  PEEP/CPAP: 5 cmH20 (05/26/18 2025)  Oxygen Concentration (%): 24 (05/29/18 0402)  Peak Airway Pressure: 16 cmH2O (05/26/18 2025)  Plateau Pressure: 0 cmH20 (05/26/18 2025)  Total Ve: 11.8 mL (05/26/18 2025)  F/VT Ratio<105 (RSBI): (!) 32.76 (05/26/18 2025)    Lines/Drains/Airways     Peripherally Inserted Central Catheter Line                 PICC Double Lumen 03/05/18 1539 right basilic 101 days         PICC Double Lumen 06/04/18 1654 left brachial 10 days          Central Venous Catheter Line                 Percutaneous Central Line Insertion/Assessment - Quad lumen  05/25/18 0800 left  subclavian 20 days          Drain                 Gastrostomy/Enterostomy 06/07/18 1657 Gastrostomy-jejunostomy LUQ 7 days         Urethral Catheter 06/11/18 1711 Latex 3 days          Line                 Pacer Wires 05/25/18 1355 20 days                Significant Labs:    CBC/Anemia Profile:    Recent Labs  Lab 06/14/18  1700 06/15/18  0009 06/15/18  0443   WBC 6.08 5.97 6.27   HGB 8.6* 8.1* 8.2*   HCT 26.3* 26.3* 26.8*    159 162   MCV 95 97 98   RDW 16.3* 16.4* 16.3*        Chemistries:    Recent Labs  Lab 06/14/18  0535  06/14/18  1700 06/15/18  0009 06/15/18  0443   *  < > 151* 149* 150*   K 3.6  < > 4.7 4.3 4.5     < > 108 109 109   CO2 30*  < > 29 31* 30*   BUN 53*  < > 54* 59* 56*   CREATININE 1.9*  < > 1.9* 2.0* 2.0*   CALCIUM 8.5*  < > 8.6* 9.4 9.6   ALBUMIN 2.3*  < > 2.4* 2.3* 2.4*   PROT 6.1  < > 6.3 6.1 6.2   BILITOT 1.5*  < > 1.5* 1.6* 1.4*   ALKPHOS 77  < > 80 79 82   ALT 12  < > 11 13 13   AST 21  < > 21 20 20   MG 2.4  < > 2.3 2.1 2.4   PHOS 4.4  --   --   --  3.8   < > = values in this interval not displayed.    Coagulation:   Recent Labs  Lab 06/15/18  0009 06/15/18  0443   INR  --  3.9*   APTT 46.4*  --        Significant Imaging:  I have reviewed all pertinent imaging results/findings within the past 24 hours.

## 2018-06-15 NOTE — PLAN OF CARE
Problem: Patient Care Overview  Goal: Plan of Care Review  Outcome: Ongoing (interventions implemented as appropriate)  Patient on 8L venti mask. SATs 94-97%. Patient switched from high flow NC to venti mask for desatting from mouth breathing during sleep. MAPs maintained 60-90 through shift. Heartrate remains stable. Patient AAOx4. All questions answered by RN, will continue to monitor.

## 2018-06-15 NOTE — PROGRESS NOTES
[facility]  Cardiothoracic Surgery  Progress Note    Patient Name: Rodolfo Messina  MRN: [id number]  Admission Date: 5/23/2018  Hospital Length of Stay: 23 days  Code Status: Full Code   Attending Physician: Marvin Go MD   Referring Provider: Marvin Go MD  Principal Problem:S/P MVR (mitral valve replacement)    Subjective:     Post-Op Info:  Procedure(s) (LRB):  Mitral Valve Replacement (N/A)  AORTOCORONARY BYPASS-CABG (N/A)   21 Days Post-Op     Interval History: No acute events overnight. Doing well this am. Satting well on NC.    Medications:  Continuous Infusions:   dextrose 5 % Stopped (06/13/18 0551)     Scheduled Meds:   amiodarone  400 mg Per G Tube BID    Followed by    [START ON 6/20/2018] amiodarone  400 mg Per G Tube Daily    amLODIPine  10 mg Per G Tube Daily    ampicillin IVPB  2 g Intravenous Q6H    aspirin  324 mg Per G Tube Daily    atorvastatin  40 mg Per G Tube Daily    cefTRIAXone (ROCEPHIN) IVPB  2 g Intravenous Q12H    doxazosin  4 mg Per G Tube Daily    famotidine  20 mg Per G Tube Daily    hydrALAZINE  25 mg Per G Tube Q8H    mirtazapine  15 mg Per G Tube QHS    polyethylene glycol  17 g Per G Tube Daily    ramelteon  8 mg Per G Tube QHS    sodium chloride 0.9%  10 mL Intravenous Q6H    sodium chloride 0.9%  3 mL Intravenous Q8H     PRN Meds:sodium chloride, albuterol-ipratropium, bisacodyl, dextromethorphan-guaifenesin  mg/5 ml, dextrose 50%, docusate, glucagon (human recombinant), hydrALAZINE, insulin aspart U-100, lactated ringers, magnesium oxide, magnesium oxide, metoclopramide HCl, morphine, ondansetron, oxyCODONE, potassium chloride 10%, potassium chloride 10%, potassium chloride 10%, potassium, sodium phosphates, potassium, sodium phosphates, potassium, sodium phosphates, sodium chloride, Flushing PICC Protocol **AND** sodium chloride 0.9% **AND** sodium chloride 0.9%, white petrolatum-mineral oil     Objective:     Vital Signs (Most  Recent):  Temp: 97.4 °F (36.3 °C) (06/15/18 1100)  Pulse: 83 (06/15/18 1400)  Resp: 19 (06/15/18 1400)  BP: (!) 112/55 (06/15/18 1400)  SpO2: (!) 93 % (06/15/18 1400) Vital Signs (24h Range):  Temp:  [97.4 °F (36.3 °C)-98 °F (36.7 °C)] 97.4 °F (36.3 °C)  Pulse:  [77-84] 83  Resp:  [17-41] 19  SpO2:  [87 %-100 %] 93 %  BP: (105-135)/(50-80) 112/55     Weight: 76.9 kg (169 lb 8.5 oz)  Body mass index is 24.33 kg/m².    SpO2: (!) 93 %  O2 Device (Oxygen Therapy): High Flow nasal Cannula    Intake/Output - Last 3 Shifts       06/13 0700 - 06/14 0659 06/14 0700 - 06/15 0659 06/15 0700 - 06/16 0659    I.V. (mL/kg) 328 (4.3) 395 (5.1)     Blood 471      NG/GT 60 800 320    IV Piggyback 350      Total Intake(mL/kg) 1209 (15.7) 1195 (15.5) 320 (4.2)    Urine (mL/kg/hr) 2485 (1.3) 2530 (1.4) 480 (0.8)    Drains 805 (0.4) 350 (0.2)     Total Output 3290 2880 480    Net -2081 -1685 -160                 Lines/Drains/Airways     Peripherally Inserted Central Catheter Line                 PICC Double Lumen 03/05/18 1539 right basilic 101 days         PICC Double Lumen 06/04/18 1654 left brachial 10 days          Central Venous Catheter Line                 Percutaneous Central Line Insertion/Assessment - Quad lumen  05/25/18 0800 left subclavian 21 days          Drain                 Gastrostomy/Enterostomy 06/07/18 1657 Gastrostomy-jejunostomy LUQ 7 days         Urethral Catheter 06/11/18 1711 Latex 3 days          Line                 Pacer Wires 05/25/18 1355 21 days                Physical Exam   Constitutional: He appears well-developed and well-nourished.   Cardiovascular: Normal rate, regular rhythm and normal heart sounds.    Incision healing well   Pulmonary/Chest: Effort normal.   HFNC   Abdominal: Soft.   PEG intact   Neurological: He is alert.   Skin: Skin is warm, dry and intact.   Psychiatric: He has a normal mood and affect.       Significant Labs:  CBC:   Recent Labs  Lab 06/15/18  1315   WBC 5.74   RBC 2.86*   HGB  8.8*   HCT 27.4*      MCV 96   MCH 30.8   MCHC 32.1     CMP:   Recent Labs  Lab 06/15/18  1315   *   CALCIUM 9.8   ALBUMIN 2.6*   PROT 6.6   *   K 4.1   CO2 28   *   BUN 55*   CREATININE 1.9*   ALKPHOS 84   ALT 14   AST 20   BILITOT 1.9*     Coagulation:   Recent Labs  Lab 06/15/18  0443  06/15/18  1315   INR 3.9*  --   --    APTT  --   < > 43.6*   < > = values in this interval not displayed.    Significant Diagnostics:  CXR with diffuse hazyness and pulmonary edema    Assessment/Plan:     * S/P MVR (mitral valve replacement)    --Continue ASA, statin (holding BB)  --ENT consult for epistaxis; concern for aspiration of old blood clots from sinuses  --CT chest pending   --HFNC satting well, wean as tolerated for O2 sat >92%  --Diuril 250 once; monitor response and dose as needed  --Monitor H/H  --Sternal precautions  --PT/OT  --Ambulate x4  --monitor CXR    --Monitor electrolytes and replace prn  --Coumadin hold today  -- Q6  --continue  lackey   --strict I&Os  --did not pass modified swallow; PICC  --G-J tube placed; TF at goal        DISPO: Continue ICU care, discharge planning on going for rehab           Dysphagia    --SLP following  --failed last BMS study   --GJ tube placed by IR 6/7        Impaired functional mobility and endurance    --discharging to rehab  --PT/OT daily         On enteral nutrition    --TFs at goal; held for emesis of blood clots        Epistaxis    --Noted to have post traumatic left sided epistaxis following attempted NGT placement. On the time of evaluation patient was hemostatic. No active bleeding noted.   -Vomited blood clots; ENT to re-evaluate  -Given that patient is hemostatic, no acute intervention   -Recommend minimize nasal trauma as best possible  -If requires oxygen, ideal if it is humidified via face tent and not nasal canula  -Bacitracin to bilateral nares nightly  -Afrin BID x 3 days  -Ocean nasal spray q6hrs while awake  -If bleeding  restarts, hold pressure to fleshy portion of nose (10 mins) and lean forward  -For bleeding that does not stop, page resident  -Remainder of medical management per primary team        Atrial flutter    --EP following appreciate recs., will reach out to them- pt currently in Afib that began 6/6- Amio IV load and infusion, now on PO amio  --holding BB        S/P CABG x 1    --see MVR         Chronic systolic congestive heart failure    - ASA, statin  - D/c beta blocker  - EP following; appreciate recs          ANNA (acute kidney injury)    --Elevated Cr  --holding Stuart Hawkins MD  Cardiothoracic Surgery  [facility]

## 2018-06-15 NOTE — PT/OT/SLP EVAL
Speech Language Pathology Evaluation  Bedside Swallow    Patient Name:  Rodolfo Messina   MRN:  [id number]  Admitting Diagnosis: S/P MVR (mitral valve replacement)    Recommendations:                 General Recommendations:  Dysphagia therapy   Diet recommendations:  NPO, NPO   Aspiration Precautions: Continue alternate means of nutrition, Frequent oral care and Strict aspiration precautions   General Precautions: Standard, aspiration, fall, sternal, NPO  Communication strategies:  Increased intensity 2/2 Patient [place]     History:     Past Medical History:   Diagnosis Date    ANNA (acute kidney injury) 2/22/2018    Anemia     Anxiety     Arthritis     BPH (benign prostatic hyperplasia)     CHF (congestive heart failure) 5/23/2018    Coronary artery disease of native artery of native heart with stable angina pectoris 5/21/2018    Diverticulosis     Encounter for blood transfusion     Hypertension     Urinary retention        Past Surgical History:   Procedure Laterality Date    APPENDECTOMY      COLONOSCOPY      COLONOSCOPY N/A 2/1/2018    Procedure: COLONOSCOPY;  Surgeon: Richar Payan MD;  Location: [facility] ([address] [place] [address]);  Service: Endoscopy;  Laterality: N/A;  PM prep    CREATION OF AORTOCORONARY ARTERY BYPASS N/A 5/25/2018    Procedure: AORTOCORONARY BYPASS-CABG;  Surgeon: Marvin Go MD;  Location: [facility] OR [address] [place] [address];  Service: Cardiovascular;  Laterality: N/A;    EYE SURGERY Right     cataract extraction    FINGER SURGERY      FRACTURE SURGERY Right     index finger    MITRAL VALVE REPLACEMENT N/A 5/25/2018    Procedure: Mitral Valve Replacement;  Surgeon: Marvin Go MD;  Location: [address];  Service: Cardiovascular;  Laterality: N/A;    TONSILLECTOMY         Social History: Patient lives with Spouse in [place]     Prior Intubation HX:  5/25/18 - 5/26/18    Modified Barium Swallow: Yes, 05/29/2018 & 06/04/2018 revealing moderate Oropharyngeal Dysphagia    Chest X-Rays: 6/15/2018:  "No significant interval change in the appearance of the chest since 06/14/2018 is appreciated.    CT Chest 6/15/18: 1.  Diffuse ground-glass attenuation densities throughout bilateral lungs with an upper lobe predominance and subpleural sparing that is likely secondary to pulmonary edema, possibly acute on chronic in this patient with a recent history of mitral valve replacement.  Aspiration may be superimposed on pulmonary edema although we consider this less likely in light of symmetric distribution of the pulmonary disease.  Viral infection and pulmonary hemorrhage are also of concern although considered less likely.    2.  Moderate quantity of dependent bilateral pleural fluid, greater on the right, with associated compressive atelectasis.    3.  Layering dense material in the gallbladder, likely sludge.  No evidence of cholecystitis.    4.  Postoperative changes of mitral valve replacement and CABG.  Sternal sutures are intact and aligned.  Sternal fragments are closely opposed.  No evidence of wound infection or mediastinitis identified.    ENT: Consult 6/13/2013 noted    Prior diet: Regular, thin. Patient currently NPO with PEG.     Occupation/hobbies/homemaking: Retired    Subjective     SLP reviewed Pt with nurse, nurse explains Patient recently took pain pill, clears Pt for ST  Patient presents calm, cooperative  He explains, "My mouth gets so dry, it is hard to do the [swallow] exercises when so dry"  His Spouse explains, "He had so much blood the other night with his nosebleed, He was coughing it up for a few days there"  Patient goals: to have some water    Pain/Comfort:  · Pain Rating 1: 4/10  · Location - Side 1: Left  · Location - Orientation 1: upper  · Location 1: leg  · Pain Addressed 1: Pre-medicate for activity, Nurse notified, Distraction  · Pain Rating Post-Intervention 1: 3/10  · Location - Side 2: Right  · Location - Orientation 2: upper  · Location 2: abdomen (PEG site)  · Pain Addressed 2: " Distraction, Pre-medicate for activity  · Pain Rating Post-Intervention 2: 3/10    Objective:   Pt found sitting up in chair in room, with nasal canula. Niece at bedside. Spouse enters room during assessment.     Oral Musculature Evaluation  · Oral Musculature: WFL  · Dentition: present and adequate  · Secretion Management: adequate  · Mucosal Quality: dry  · Mandibular Strength and Mobility: WFL  · Oral Labial Strength and Mobility: WFL  · Lingual Strength and Mobility: WFL  · Velar Elevation: WFL  · Buccal Strength and Mobility: WFL  · Volitional Cough: elciited, reduced   · Volitional Swallow: elciited   · Voice Prior to PO Intake: dry, breathy, Pt reports variable intensity at baseline 2/2 vocal tremors    Bedside Swallow Eval:   Consistencies Assessed:  · Thin liquids ice chips x3  · Honey thick liquids 1/2 teaspoon sips x2, tsp sips x2  · Puree 1/2 tsp bite x1     Oral Phase:   · WFL    Pharyngeal Phase:   · coughing/choking  · decreased hyolaryngeal excursion to palpation  · delayed swallow initation    Compensatory Strategies  · Effortful swallow  · Multiple swallows    Treatment: Pt recalls aspiration precautions from prior therapy Independently. Patient uses multiple, effortful swallows with all PO trials following set-up. He demonstrates immediate cough with trials of honey-thickened liquids and puree and delayed throat clear x1 with trial of ice chip. Patient, Niece and Spouse educated extensively on aspiration precautions, dysphagia exercises, and S/S aspiration. Patient and family verbalize understanding of SLP recommendations. Patient expressing frustration with recent re-admission to ICU and feeling like he has had another set-back, SLP provides encouragement/redirection. No further questions. Whiteboard updated.      Assessment:     Rodolfo Messina is a 68 y.o. male with an SLP diagnosis of Oropharyngeal Dysphagia.  He presents with increased oxygen requirements and overt S/S aspiration with all  PO trials at the bedside today.  Strict aspiration precautions and ongoing strengthening Dypshagia tx advised    Goals:    SLP Goals        Problem: SLP Goal    Goal Priority Disciplines Outcome   SLP Goal     SLP Ongoing (interventions implemented as appropriate)   Description:  Speech Language Pathology Goals  Goals expected to be met by 6/22/2018  1. Pt will to tolerate 1/2 tsp bites puree while completing 2-3 swallows per bolus w/o overt S/S aspiration, MIN A  2. Pt will tolerate trials of tsp sips honey thickened liquids while completing 2-3 swallows per bolus w/o overt S/S aspiration, MIN A   3. Educate Pt and family on safe swallow strategies and S/S aspiration   4. Continue to assess swallow to determine feasibility of re-initiation of pleasure feeds   5. Pt will complete dysphagia exercises given min A    Goals expected to be met by 6/11 -unable to meet prior to transfer to ICU  1. Pt will tolerate puree & honey thickened liquids for pleasure while completing 2-3 swallows per bolus w/o overt S/S aspiration, MIN A   3. Educate Pt and family on safe swallow strategies and S/S aspiration   4. Continue to assess swallow to determine feasibility of PO upgrade   5. Pt will complete dysphagia exercises given min A      Speech Language Pathology Goals  Goals expected to be met by 6/7:  1. Pt will tolerate trials of puree w/o overt S/S aspiration, MIN A - CONTINUE  2. Pt will tolerate trials of nectar-thickened liquids w/o overt S/S aspiration, MIN A - DISCONTINUE  3. Educate Pt and family on safe swallow strategies and S/S aspiration - CONTINUE  4. Continue to assess swallow to determine feasibility of PO upgrade - CONTINUE      Goals expected to be met by 6/4: goals not met 2/2 transfer to ICU 5/30  1. Pt will participate in ongoing swallowing assessment to determine safest and least restrictive diet.                            Plan:     · Patient to be seen:  5 x/week   · Plan of Care expires:  06/30/18  · Plan  of Care reviewed with:  patient, spouse   · SLP Follow-Up:  Yes       Discharge recommendations:  rehabilitation facility   Barriers to Discharge:  Level of Skilled Assistance Needed     Time Tracking:     SLP Treatment Date:   06/15/18  Speech Start Time:  1438  Speech Stop Time:  1510     Speech Total Time (min):  32 min    Billable Minutes: Eval Swallow and Oral Function 32    CAREY Salgado., [facility]-SLP  Speech-Language Pathology  Pager: [phone]      06/15/2018

## 2018-06-15 NOTE — ASSESSMENT & PLAN NOTE
68yo M with PMH of mitral valve endocarditis and resultant severe MR, TR and pulmonary HTN who is now s/p IABP placement (5/24) and s/pMVR and CABG 5/25.     Plan:     Neuro:   - AAOx4 this AM.   - PRN oxy via J tube for pain control     Pulmonary:   - Currently on 8L high flow facemask O2, wean as tolerated  - Continuous pulse ox.   - ABGs/CXR if distressed.   -monitor CXR, no significant change from prior     Cardiac:  - MAP goal >65  - Paced at 80  - Home amlodipine, ASA, and statin daily.  - Coumadin therapy, INR 3.9 today  - Continuous cardiac monitoring.  - Pathology from valve returned with active subacute endocarditis. S/p ID consultation. PICC placed for long term abx administration.  Per ID Ampicillin and Ceftriaxone x 6 weeks for Enterococcal SBE (end date 7/13/18). In addition, patient will need ESR, CRP, CBC and CMP to be drawn weekly with results faxed to the ID Department at [phone] set up prior to discharge. Appreciate sw assistance.       Renal:   -UOP adequate.   -Bun/Cr stable, 56/2.0  -Flomax daily given home use.  - MIVF off     Fluids/Electrolytes/Nutrition/GI:   - NPO, re-start tube feeds, management per CTS  - Free water flushes    - failed MBSS  - Replace lytes PRN.  - Bowel regimen.     Hematology/Oncology:  - H/H stable, 8.2/26.8 Transfusion per primary team.  - INR daily, 3.9 today; consider holding coumadin  - CBC daily.     Infectious Disease:   -Afebrile  -WBC wnl  -CBC daily   -Pathology from valve with active endocarditis, ID consulted, recommend Ampicillin and Ceftriaxone x 6 weeks for Enterococcal SBE (end date 7/13/18). In addition, patient will need ESR, CRP, CBC and CMP to be drawn weekly with results faxed to the ID Department at [phone] set up prior to discharge. Appreciate sw assistance.      Endocrine:  - No h/o DM or thyroid dysfunction  - s/p Endocrine evaluation. Continue following recs.   - Glucose goal of 120-180     Dispo:  - Continue ICU care  -CTS primary

## 2018-06-15 NOTE — CONSULTS
Pulmonary / Critical Care Medicine  Consult Note    Primary Attending:  Marvin Go MD   Primary Team: Cardiothoracic Surgery   Consultant Attending: Dr. Guevara   Consultant Fellow: Erwin Alberto MD     Reason for Consult  Hypoxemic respiratory failure with a history of prior aspiration events     History of Present Illness:  Mr. Messina is a 68 year old gentleman who was well and in his usual state of health up until approximately 6 months ago, when he first developed a persistent cough, exertional dyspnea and wheezing.  He was seen in the emergency department here several times through January for theses symptoms and in February was found to be profoundly anemic, prompting admission and transfusion.  He was discharged after a brief stay, but his respiratory symptoms rapidly recurred requiring readmission.  Its not exactly clear why, but at some point he had an indwelling lackey for approximately 2 weeks, subsequently developing entercoccal bacteremia and had to be placed on an extended course of IV antibiotics.  From late February onward, he was admitted for much of the late winter/spring of this year, while undergoing an exhaustive work up to determine the cause of his persistent anemia as well as several brief ICU stays for pulmonary edema.  Eventually, he was found to have mitral valve endocarditis with resultant severe MR, TR and pulmonary hypertension.  After obtaining sterility and optimizing him medically, he was discharged home, and then brought back 3 weeks ago for a mitral valve repair with LIMA to LAD coronary artery bypass grafting.    Since that time, his recovery has been slow and difficult.   Gradually over the past week, he has developed worsening hypoxemic respiratory failure with increasing bilateral opacities on his chest radiographs.  Additionally, he has never passed a swallow study and a gastrostomy was recently placed to allow for enteral feeding.  A chest CT was performed today and given  some new abnormal radiographic findings, we have been asked to consult for his hypoxic respiratory failure.     Past Medical History:   ANNA (acute kidney injury) 2/22/2018    Anemia     Anxiety     Arthritis     BPH (benign prostatic hyperplasia)     CHF (congestive heart failure) 5/23/2018    Coronary artery disease of native artery of native heart with stable angina pectoris 5/21/2018    Diverticulosis     Encounter for blood transfusion     Hypertension     Urinary retention         Past Surgical History:   APPENDECTOMY      COLONOSCOPY      COLONOSCOPY N/A 2/1/2018    Procedure: COLONOSCOPY;  Surgeon: Richar Payan MD;  Location: [facility] (4TH FLR);  Service: Endoscopy;  Laterality: N/A;  PM prep    CREATION OF AORTOCORONARY ARTERY BYPASS N/A 5/25/2018    Procedure: AORTOCORONARY BYPASS-CABG;  Surgeon: Marvin Go MD;  Location: [facility] OR 2ND FLR;  Service: Cardiovascular;  Laterality: N/A;    EYE SURGERY Right     cataract extraction    FINGER SURGERY      FRACTURE SURGERY Right     index finger    MITRAL VALVE REPLACEMENT N/A 5/25/2018    Procedure: Mitral Valve Replacement;  Surgeon: Marvin Go MD;  [facility];  Service: Cardiovascular;  Laterality: N/A;    TONSILLECTOMY          Allergies:   Shellfish containing products     Augmentin [amoxicillin-pot clavulanate]      Patient felt that it raised BP and requested to have it added as intolerance. Tolerated unasyn and ampicillin.    Ciprofloxacin     Flagyl [metronidazole]     Lisinopril Other (See Comments)     cough    Mysoline [primidone]     Omnicef [cefdinir]         Medications:   Home Medications:     albuterol-ipratropium 2.5mg-0.5mg/3mL (DUO-NEB) 0.5 mg-3 mg(2.5 mg base)/3 mL nebulizer solution 3 mLs every 4 (four) hours as needed.     aspirin (ECOTRIN) 81 MG EC tablet Take 1 tablet (81 mg total) by mouth once daily.    atorvastatin (LIPITOR) 20 MG tablet Take 2 tablets (40 mg total) by mouth once  daily.    benzonatate (TESSALON) 100 MG capsule Take 100 mg by mouth 3 (three) times daily as needed for Cough.    furosemide (LASIX) 40 MG tablet Take 1 tablet (40 mg total) by mouth once daily.    loratadine (CLARITIN) 10 mg tablet Take 10 mg by mouth once daily.    mirtazapine (REMERON) 30 MG tablet Take 1 tablet (30 mg total) by mouth every evening.    pantoprazole (PROTONIX) 40 MG tablet Take 1 tablet (40 mg total) by mouth once daily.    tamsulosin (FLOMAX) 0.4 mg Cp24 Take 2 capsules (0.8 mg total) by mouth 2 (two) times daily.    VENTOLIN HFA 90 mcg/actuation inhaler          Current Medications:  Scheduled:   amiodarone  400 mg BID    Followed by    [START ON 6/20/2018] amiodarone  400 mg Daily    amLODIPine  10 mg Daily    ampicillin IVPB  2 g Q6H    aspirin  324 mg Daily    atorvastatin  40 mg Daily    cefTRIAXone  2 g Q12H    doxazosin  4 mg Daily    famotidine  20 mg Daily    hydrALAZINE  25 mg Q8H    mirtazapine  15 mg QHS    polyethylene glycol  17 g Daily    ramelteon  8 mg QHS    sodium chloride 0.9%  10 mL Q6H    sodium chloride 0.9%  3 mL Q8H     Continuous Infusions:   dextrose 5 % Stopped (06/13/18 0551)     PRN:   sodium chloride, albuterol-ipratropium, bisacodyl, dextromethorphan-guaifenesin  mg/5 ml, dextrose 50%, docusate, glucagon (human recombinant), hydrALAZINE, insulin aspart U-100, lactated ringers, magnesium oxide, magnesium oxide, metoclopramide HCl, ondansetron, potassium chloride 10%, potassium chloride 10%, potassium chloride 10%, potassium, sodium phosphates, potassium, sodium phosphates, potassium, sodium phosphates, sodium chloride, Flushing PICC Protocol **AND** sodium chloride 0.9% **AND** sodium chloride 0.9%, white petrolatum-mineral oil     Social History:  Tobacco: Lifetime non-smoker   EtOH: Denies   Illicit Drugs: Denies   Occupation Data Unavailable.       Family History:  Mother: family history includes Heart disease in his father and  mother; No Known Problems in his brother and sister; Stroke in his mother.   Father: As mentioned above     Review of Systems:  · Other than those symptoms mentioned above, an extensive review of systems was unremarkable.     Vital Signs   Temp:  [97.4 °F (36.3 °C)-98 °F (36.7 °C)]   Pulse:  [77-84]   Resp:  [17-41]   BP: (112-135)/(55-80)   SpO2:  [88 %-100 %]    Physical Exam   Gen: somnolent; no distress; sitting comfortably in his chair   HEENT: lips, mucosa, and tongue normal; teeth and gums normal, no throat erythema and dried blood in the posterior pharynx  conjunctivae/corneas clear. PERRL., pupils pinpoint   CVS: RRR; + murmur   Chest: normal percussion bilaterally and rales bilaterally   Abdomen: Soft and non-tender; G-tube secure and patent   Ext: warm, well perfused and edema 2+   Skin: no rashes, no ecchymoses, no petechiae   Neuro: oriented, normal mood, grossly non-focal      Labs   WBC 6.27 5.74   RBC 2.75* 2.86*   HGB 8.2* 8.8*   HCT 26.8* 27.4*    161   MCV 98 96   MCH 29.8 30.8   MCHC 30.6* 32.1   * 149*   K 4.5 4.1    111*   CO2 30* 28   BUN 56* 55*   CREATININE 2.0* 1.9*   MG 2.4  --    ALT 13 14   AST 20 20   ALKPHOS 82 84   BILITOT 1.4* 1.9*   PROT 6.2 6.6   ALBUMIN 2.4* 2.6*   INR 3.9*  --    APTT  --  43.6*      Imaging CXR        CT Chest 06/15/2018        06/15/2018   gradually increasing diffuse bilateral hazy opaicites with prominent yesenia    Diffuse GGO/mosacism with bilateral pleural effusions, right greater than left  Bronchial wall thickening in the proximal airways in the dependent portions of the right lung      Other Studies:   PFTs   2/2018   No obstruction   Echo 5/30/2018 The left ventricle is normal in size with moderately depressed systolic function and septal motion consistent with prior cardiac surgery.  The estimated LVEF is 35-40%.    The aortic and tricuspid valves appear normal in structure and mobility.  The pulmonic valve is not seen.    There is a  bioprosthesis present in the mitral position.  Its function is not evaluated in this study.    There is no evidence of pericardial effusion.      Micro Blood Cx 6/1 6/1 No growth  No growth   Sputum Cx 3/27 Normal wendie      Assessment/Plan:  1. Acute hypoxemic respiratory failure  2. Cardiogenic pulmonary edema  3. Bilateral pleural effusions  4. Suspected chronic aspiration  5. Persistent epistaxis  6. Supra therapeutic INR  · Radiographic findings and physical exam suggestive of cardiogenic pulmonary edema.  · Agree with continued aggressive diuresis.  · Certainly appears to be aspirating chronically.  · G-tube in place.  However, epistaxis and narcotic use seem to be potentiating his chronic aspiration.  · Agree with mobilization, limiting sedating agents, and correction of coagulopathy.  · Wean HFNC as tolerated.  · No plans to sample/drain effusions at the moment given INR.  Observe for resolution with diuresis.    Erwin Alberto MD  Pulmonary / Critical Care Fellow  Cell [phone]  06/15/2018  5:50 PM

## 2018-06-15 NOTE — SUBJECTIVE & OBJECTIVE
Interval History: No acute events overnight. Doing well this am. Satting well on NC.    Medications:  Continuous Infusions:   dextrose 5 % Stopped (06/13/18 0551)     Scheduled Meds:   amiodarone  400 mg Per G Tube BID    Followed by    [START ON 6/20/2018] amiodarone  400 mg Per G Tube Daily    amLODIPine  10 mg Per G Tube Daily    ampicillin IVPB  2 g Intravenous Q6H    aspirin  324 mg Per G Tube Daily    atorvastatin  40 mg Per G Tube Daily    cefTRIAXone (ROCEPHIN) IVPB  2 g Intravenous Q12H    doxazosin  4 mg Per G Tube Daily    famotidine  20 mg Per G Tube Daily    hydrALAZINE  25 mg Per G Tube Q8H    mirtazapine  15 mg Per G Tube QHS    polyethylene glycol  17 g Per G Tube Daily    ramelteon  8 mg Per G Tube QHS    sodium chloride 0.9%  10 mL Intravenous Q6H    sodium chloride 0.9%  3 mL Intravenous Q8H     PRN Meds:sodium chloride, albuterol-ipratropium, bisacodyl, dextromethorphan-guaifenesin  mg/5 ml, dextrose 50%, docusate, glucagon (human recombinant), hydrALAZINE, insulin aspart U-100, lactated ringers, magnesium oxide, magnesium oxide, metoclopramide HCl, morphine, ondansetron, oxyCODONE, potassium chloride 10%, potassium chloride 10%, potassium chloride 10%, potassium, sodium phosphates, potassium, sodium phosphates, potassium, sodium phosphates, sodium chloride, Flushing PICC Protocol **AND** sodium chloride 0.9% **AND** sodium chloride 0.9%, white petrolatum-mineral oil     Objective:     Vital Signs (Most Recent):  Temp: 97.4 °F (36.3 °C) (06/15/18 1100)  Pulse: 83 (06/15/18 1400)  Resp: 19 (06/15/18 1400)  BP: (!) 112/55 (06/15/18 1400)  SpO2: (!) 93 % (06/15/18 1400) Vital Signs (24h Range):  Temp:  [97.4 °F (36.3 °C)-98 °F (36.7 °C)] 97.4 °F (36.3 °C)  Pulse:  [77-84] 83  Resp:  [17-41] 19  SpO2:  [87 %-100 %] 93 %  BP: (105-135)/(50-80) 112/55     Weight: 76.9 kg (169 lb 8.5 oz)  Body mass index is 24.33 kg/m².    SpO2: (!) 93 %  O2 Device (Oxygen Therapy): High Flow nasal  Cannula    Intake/Output - Last 3 Shifts       06/13 0700 - 06/14 0659 06/14 0700 - 06/15 0659 06/15 0700 - 06/16 0659    I.V. (mL/kg) 328 (4.3) 395 (5.1)     Blood 471      NG/GT 60 800 320    IV Piggyback 350      Total Intake(mL/kg) 1209 (15.7) 1195 (15.5) 320 (4.2)    Urine (mL/kg/hr) 2485 (1.3) 2530 (1.4) 480 (0.8)    Drains 805 (0.4) 350 (0.2)     Total Output 3290 2880 480    Net -2081 -1681 -160                 Lines/Drains/Airways     Peripherally Inserted Central Catheter Line                 PICC Double Lumen 03/05/18 1539 right basilic 101 days         PICC Double Lumen 06/04/18 1654 left brachial 10 days          Central Venous Catheter Line                 Percutaneous Central Line Insertion/Assessment - Quad lumen  05/25/18 0800 left subclavian 21 days          Drain                 Gastrostomy/Enterostomy 06/07/18 1657 Gastrostomy-jejunostomy LUQ 7 days         Urethral Catheter 06/11/18 1711 Latex 3 days          Line                 Pacer Wires 05/25/18 1355 21 days                Physical Exam   Constitutional: He appears well-developed and well-nourished.   Cardiovascular: Normal rate, regular rhythm and normal heart sounds.    Incision healing well   Pulmonary/Chest: Effort normal.   HFNC   Abdominal: Soft.   PEG intact   Neurological: He is alert.   Skin: Skin is warm, dry and intact.   Psychiatric: He has a normal mood and affect.       Significant Labs:  CBC:   Recent Labs  Lab 06/15/18  1315   WBC 5.74   RBC 2.86*   HGB 8.8*   HCT 27.4*      MCV 96   MCH 30.8   MCHC 32.1     CMP:   Recent Labs  Lab 06/15/18  1315   *   CALCIUM 9.8   ALBUMIN 2.6*   PROT 6.6   *   K 4.1   CO2 28   *   BUN 55*   CREATININE 1.9*   ALKPHOS 84   ALT 14   AST 20   BILITOT 1.9*     Coagulation:   Recent Labs  Lab 06/15/18  0443  06/15/18  1315   INR 3.9*  --   --    APTT  --   < > 43.6*   < > = values in this interval not displayed.    Significant Diagnostics:  CXR with diffuse hazyness and  pulmonary edema

## 2018-06-15 NOTE — PROGRESS NOTES
[facility]  Critical Care - Surgery  Progress Note    Patient Name: Rodolfo Messina  MRN: [id number]  Admission Date: 5/23/2018  Hospital Length of Stay: 23 days  Code Status: Full Code  Attending Provider: Marvin Go MD  Primary Care Provider: Deric Claudio MD   Principal Problem: S/P MVR (mitral valve replacement)    Subjective:     Interval History/Significant Events: No acute events overnight, afebrile, vitals stable. Currently maintaining sats on 8L facemask, switched from NC overnight given mouth breathing. UOP excellent yesterday, net negative ~1.2L.  CXR still with patchy airspace disease, most significant in right middle and lower lung. INR elevated at 3.9 today.       Follow-up For: Procedure(s) (LRB):  Mitral Valve Replacement (N/A)  AORTOCORONARY BYPASS-CABG (N/A)    Post-Operative Day: 21 Days Post-Op    Objective:     Vital Signs (Most Recent):  Temp: 98 °F (36.7 °C) (06/15/18 0300)  Pulse: 81 (06/15/18 0600)  Resp: (!) 23 (06/15/18 0600)  BP: 135/63 (06/15/18 0600)  SpO2: 97 % (06/15/18 0600) Vital Signs (24h Range):  Temp:  [97.5 °F (36.4 °C)-98 °F (36.7 °C)] 98 °F (36.7 °C)  Pulse:  [74-86] 81  Resp:  [15-44] 23  SpO2:  [87 %-99 %] 97 %  BP: (103-135)/(50-80) 135/63     Weight: 76.9 kg (169 lb 8.5 oz)  Body mass index is 24.33 kg/m².      Intake/Output Summary (Last 24 hours) at 06/15/18 0650  Last data filed at 06/15/18 0600   Gross per 24 hour   Intake             1195 ml   Output             2580 ml   Net            -1385 ml       Physical Exam    Vents:  Vent Mode: Spont (05/26/18 2025)  Ventilator Initiated: Yes (05/25/18 1509)  Set Rate: 0 bmp (05/26/18 2025)  Vt Set: 500 mL (05/26/18 2025)  Pressure Support: 10 cmH20 (05/26/18 2025)  PEEP/CPAP: 5 cmH20 (05/26/18 2025)  Oxygen Concentration (%): 24 (05/29/18 0402)  Peak Airway Pressure: 16 cmH2O (05/26/18 2025)  Plateau Pressure: 0 cmH20 (05/26/18 2025)  Total Ve: 11.8 mL (05/26/18 2025)  F/VT Ratio<105 (RSBI): (!)  32.76 (05/26/18 2025)    Lines/Drains/Airways     Peripherally Inserted Central Catheter Line                 PICC Double Lumen 03/05/18 1539 right basilic 101 days         PICC Double Lumen 06/04/18 1654 left brachial 10 days          Central Venous Catheter Line                 Percutaneous Central Line Insertion/Assessment - Quad lumen  05/25/18 0800 left subclavian 20 days          Drain                 Gastrostomy/Enterostomy 06/07/18 1657 Gastrostomy-jejunostomy LUQ 7 days         Urethral Catheter 06/11/18 1711 Latex 3 days          Line                 Pacer Wires 05/25/18 1355 20 days                Significant Labs:    CBC/Anemia Profile:    Recent Labs  Lab 06/14/18  1700 06/15/18  0009 06/15/18  0443   WBC 6.08 5.97 6.27   HGB 8.6* 8.1* 8.2*   HCT 26.3* 26.3* 26.8*    159 162   MCV 95 97 98   RDW 16.3* 16.4* 16.3*        Chemistries:    Recent Labs  Lab 06/14/18  0535  06/14/18  1700 06/15/18  0009 06/15/18  0443   *  < > 151* 149* 150*   K 3.6  < > 4.7 4.3 4.5     < > 108 109 109   CO2 30*  < > 29 31* 30*   BUN 53*  < > 54* 59* 56*   CREATININE 1.9*  < > 1.9* 2.0* 2.0*   CALCIUM 8.5*  < > 8.6* 9.4 9.6   ALBUMIN 2.3*  < > 2.4* 2.3* 2.4*   PROT 6.1  < > 6.3 6.1 6.2   BILITOT 1.5*  < > 1.5* 1.6* 1.4*   ALKPHOS 77  < > 80 79 82   ALT 12  < > 11 13 13   AST 21  < > 21 20 20   MG 2.4  < > 2.3 2.1 2.4   PHOS 4.4  --   --   --  3.8   < > = values in this interval not displayed.    Coagulation:   Recent Labs  Lab 06/15/18  0009 06/15/18  0443   INR  --  3.9*   APTT 46.4*  --        Significant Imaging:  I have reviewed all pertinent imaging results/findings within the past 24 hours.    Assessment/Plan:     Chronic systolic congestive heart failure     68yo M with PMH of mitral valve endocarditis and resultant severe MR, TR and pulmonary HTN who is now s/p IABP placement (5/24) and s/pMVR and CABG 5/25.     Plan:     Neuro:   - AAOx4 this AM.   - PRN oxy via J tube for pain  control     Pulmonary:   - Currently on 8L high flow facemask O2, wean as tolerated  - Continuous pulse ox.   - ABGs/CXR if distressed.   -monitor CXR, no significant change from prior     Cardiac:  - MAP goal >65  - Paced at 80  - Home amlodipine, ASA, and statin daily.  - Coumadin therapy, INR 3.9 today  - Continuous cardiac monitoring.  - Pathology from valve returned with active subacute endocarditis. S/p ID consultation. PICC placed for long term abx administration.  Per ID Ampicillin and Ceftriaxone x 6 weeks for Enterococcal SBE (end date 7/13/18). In addition, patient will need ESR, CRP, CBC and CMP to be drawn weekly with results faxed to the ID Department at [phone] set up prior to discharge. Appreciate sw assistance.       Renal:   -UOP adequate.   -Bun/Cr stable, 56/2.0  -Flomax daily given home use.  - MIVF off     Fluids/Electrolytes/Nutrition/GI:   - NPO, re-start tube feeds, management per [facility]  - Free water flushes    - failed MBSS  - Replace lytes PRN.  - Bowel regimen.     Hematology/Oncology:  - H/H stable, 8.2/26.8 Transfusion per primary team.  - INR daily, 3.9 today; consider holding coumadin  - CBC daily.     Infectious Disease:   -Afebrile  -WBC wnl  -CBC daily   -Pathology from valve with active endocarditis, ID consulted, recommend Ampicillin and Ceftriaxone x 6 weeks for Enterococcal SBE (end date 7/13/18). In addition, patient will need ESR, CRP, CBC and CMP to be drawn weekly with results faxed to the ID Department at [phone] set up prior to discharge. Appreciate sw assistance.      Endocrine:  - No h/o DM or thyroid dysfunction  - s/p Endocrine evaluation. Continue following recs.   - Glucose goal of 120-180     Dispo:  - Continue ICU care  -[facility] primary                  Critical care was time spent personally by me on the following activities: development of treatment plan with patient or surrogate and bedside caregivers, discussions with consultants, evaluation of patient's  response to treatment, examination of patient, ordering and performing treatments and interventions, ordering and review of laboratory studies, ordering and review of radiographic studies, pulse oximetry, re-evaluation of patient's condition.  This critical care time did not overlap with that of any other provider or involve time for any procedures.     Pepper Chadwick MD  Critical Care - Surgery  [facility]

## 2018-06-15 NOTE — ASSESSMENT & PLAN NOTE
--Continue ASA, statin (holding BB)  --ENT consult for epistaxis; concern for aspiration of old blood clots from sinuses  --CT chest pending   --HFNC satting well, wean as tolerated for O2 sat >92%  --Diuril 250 once; monitor response and dose as needed  --Monitor H/H  --Sternal precautions  --PT/OT  --Ambulate x4  --monitor CXR    --Monitor electrolytes and replace prn  --Coumadin hold today  -- Q6  --continue  lackey   --strict I&Os  --did not pass modified swallow; PICC  --G-J tube placed; TF at goal        DISPO: Continue ICU care, discharge planning on going for rehab

## 2018-06-16 PROBLEM — R91.8 PULMONARY INFILTRATES ON CXR: Status: ACTIVE | Noted: 2018-06-16

## 2018-06-16 LAB
ALBUMIN SERPL BCP-MCNC: 2.4 G/DL
ALBUMIN SERPL BCP-MCNC: 2.6 G/DL
ALBUMIN SERPL BCP-MCNC: 2.6 G/DL
ALP SERPL-CCNC: 80 U/L
ALP SERPL-CCNC: 80 U/L
ALP SERPL-CCNC: 83 U/L
ALP SERPL-CCNC: 85 U/L
ALP SERPL-CCNC: 87 U/L
ALT SERPL W/O P-5'-P-CCNC: 12 U/L
ALT SERPL W/O P-5'-P-CCNC: 13 U/L
ALT SERPL W/O P-5'-P-CCNC: 13 U/L
ANION GAP SERPL CALC-SCNC: 10 MMOL/L
ANION GAP SERPL CALC-SCNC: 10 MMOL/L
ANION GAP SERPL CALC-SCNC: 6 MMOL/L
ANION GAP SERPL CALC-SCNC: 6 MMOL/L
ANION GAP SERPL CALC-SCNC: 7 MMOL/L
APTT BLDCRRT: 46.3 SEC
APTT BLDCRRT: 49.5 SEC
APTT BLDCRRT: 50.4 SEC
APTT BLDCRRT: 50.8 SEC
AST SERPL-CCNC: 18 U/L
AST SERPL-CCNC: 19 U/L
AST SERPL-CCNC: 21 U/L
AST SERPL-CCNC: 23 U/L
AST SERPL-CCNC: 23 U/L
BASOPHILS # BLD AUTO: 0.01 K/UL
BASOPHILS # BLD AUTO: 0.02 K/UL
BASOPHILS NFR BLD: 0.2 %
BASOPHILS NFR BLD: 0.3 %
BASOPHILS NFR BLD: 0.3 %
BASOPHILS NFR BLD: 0.4 %
BILIRUB SERPL-MCNC: 1.4 MG/DL
BILIRUB SERPL-MCNC: 1.4 MG/DL
BILIRUB SERPL-MCNC: 1.7 MG/DL
BILIRUB SERPL-MCNC: 1.8 MG/DL
BILIRUB SERPL-MCNC: 1.9 MG/DL
BUN SERPL-MCNC: 56 MG/DL
BUN SERPL-MCNC: 58 MG/DL
BUN SERPL-MCNC: 58 MG/DL
BUN SERPL-MCNC: 62 MG/DL
BUN SERPL-MCNC: 65 MG/DL
CALCIUM SERPL-MCNC: 9.6 MG/DL
CALCIUM SERPL-MCNC: 9.6 MG/DL
CALCIUM SERPL-MCNC: 9.7 MG/DL
CHLORIDE SERPL-SCNC: 110 MMOL/L
CHLORIDE SERPL-SCNC: 110 MMOL/L
CHLORIDE SERPL-SCNC: 111 MMOL/L
CO2 SERPL-SCNC: 31 MMOL/L
CO2 SERPL-SCNC: 32 MMOL/L
CO2 SERPL-SCNC: 33 MMOL/L
CREAT SERPL-MCNC: 2.1 MG/DL
CREAT SERPL-MCNC: 2.3 MG/DL
CREAT SERPL-MCNC: 2.3 MG/DL
DIFFERENTIAL METHOD: ABNORMAL
EOSINOPHIL # BLD AUTO: 0.3 K/UL
EOSINOPHIL # BLD AUTO: 0.4 K/UL
EOSINOPHIL NFR BLD: 5.3 %
EOSINOPHIL NFR BLD: 5.3 %
EOSINOPHIL NFR BLD: 5.5 %
EOSINOPHIL NFR BLD: 6 %
ERYTHROCYTE [DISTWIDTH] IN BLOOD BY AUTOMATED COUNT: 15.9 %
ERYTHROCYTE [DISTWIDTH] IN BLOOD BY AUTOMATED COUNT: 15.9 %
ERYTHROCYTE [DISTWIDTH] IN BLOOD BY AUTOMATED COUNT: 16 %
ERYTHROCYTE [DISTWIDTH] IN BLOOD BY AUTOMATED COUNT: 16 %
EST. GFR  (AFRICAN AMERICAN): 32.5 ML/MIN/1.73 M^2
EST. GFR  (AFRICAN AMERICAN): 32.5 ML/MIN/1.73 M^2
EST. GFR  (AFRICAN AMERICAN): 36.3 ML/MIN/1.73 M^2
EST. GFR  (NON AFRICAN AMERICAN): 28.1 ML/MIN/1.73 M^2
EST. GFR  (NON AFRICAN AMERICAN): 28.1 ML/MIN/1.73 M^2
EST. GFR  (NON AFRICAN AMERICAN): 31.4 ML/MIN/1.73 M^2
GLUCOSE SERPL-MCNC: 132 MG/DL
GLUCOSE SERPL-MCNC: 132 MG/DL
GLUCOSE SERPL-MCNC: 133 MG/DL
GLUCOSE SERPL-MCNC: 145 MG/DL
GLUCOSE SERPL-MCNC: 153 MG/DL
HCT VFR BLD AUTO: 26.2 %
HCT VFR BLD AUTO: 26.2 %
HCT VFR BLD AUTO: 26.3 %
HCT VFR BLD AUTO: 26.7 %
HGB BLD-MCNC: 8.1 G/DL
HGB BLD-MCNC: 8.2 G/DL
HGB BLD-MCNC: 8.2 G/DL
HGB BLD-MCNC: 8.5 G/DL
IMM GRANULOCYTES # BLD AUTO: 0.02 K/UL
IMM GRANULOCYTES # BLD AUTO: 0.02 K/UL
IMM GRANULOCYTES # BLD AUTO: 0.03 K/UL
IMM GRANULOCYTES # BLD AUTO: 0.03 K/UL
IMM GRANULOCYTES NFR BLD AUTO: 0.3 %
IMM GRANULOCYTES NFR BLD AUTO: 0.4 %
IMM GRANULOCYTES NFR BLD AUTO: 0.5 %
IMM GRANULOCYTES NFR BLD AUTO: 0.5 %
INR PPP: 4
LYMPHOCYTES # BLD AUTO: 0.5 K/UL
LYMPHOCYTES NFR BLD: 10 %
LYMPHOCYTES NFR BLD: 8.3 %
LYMPHOCYTES NFR BLD: 8.3 %
LYMPHOCYTES NFR BLD: 9 %
MAGNESIUM SERPL-MCNC: 2.3 MG/DL
MAGNESIUM SERPL-MCNC: 2.4 MG/DL
MAGNESIUM SERPL-MCNC: 2.5 MG/DL
MAGNESIUM SERPL-MCNC: 2.6 MG/DL
MCH RBC QN AUTO: 30.1 PG
MCH RBC QN AUTO: 30.7 PG
MCHC RBC AUTO-ENTMCNC: 30.8 G/DL
MCHC RBC AUTO-ENTMCNC: 31.3 G/DL
MCHC RBC AUTO-ENTMCNC: 31.3 G/DL
MCHC RBC AUTO-ENTMCNC: 31.8 G/DL
MCV RBC AUTO: 96 FL
MCV RBC AUTO: 98 FL
MONOCYTES # BLD AUTO: 0.4 K/UL
MONOCYTES # BLD AUTO: 0.5 K/UL
MONOCYTES # BLD AUTO: 0.6 K/UL
MONOCYTES # BLD AUTO: 0.6 K/UL
MONOCYTES NFR BLD: 7.9 %
MONOCYTES NFR BLD: 8.3 %
MONOCYTES NFR BLD: 8.6 %
MONOCYTES NFR BLD: 8.6 %
NEUTROPHILS # BLD AUTO: 4 K/UL
NEUTROPHILS # BLD AUTO: 4.5 K/UL
NEUTROPHILS # BLD AUTO: 4.9 K/UL
NEUTROPHILS # BLD AUTO: 4.9 K/UL
NEUTROPHILS NFR BLD: 75.4 %
NEUTROPHILS NFR BLD: 76.6 %
NEUTROPHILS NFR BLD: 77 %
NEUTROPHILS NFR BLD: 77 %
NRBC BLD-RTO: 0 /100 WBC
PHOSPHATE SERPL-MCNC: 3.9 MG/DL
PLATELET # BLD AUTO: 160 K/UL
PLATELET # BLD AUTO: 167 K/UL
PLATELET # BLD AUTO: 167 K/UL
PLATELET # BLD AUTO: 175 K/UL
PMV BLD AUTO: 11.7 FL
PMV BLD AUTO: 12 FL
PMV BLD AUTO: 12.5 FL
PMV BLD AUTO: 12.5 FL
POCT GLUCOSE: 141 MG/DL (ref 70–110)
POCT GLUCOSE: 154 MG/DL (ref 70–110)
POCT GLUCOSE: 177 MG/DL (ref 70–110)
POTASSIUM SERPL-SCNC: 3.8 MMOL/L
POTASSIUM SERPL-SCNC: 4.1 MMOL/L
POTASSIUM SERPL-SCNC: 4.2 MMOL/L
POTASSIUM SERPL-SCNC: 4.3 MMOL/L
POTASSIUM SERPL-SCNC: 4.3 MMOL/L
PROT SERPL-MCNC: 6.1 G/DL
PROT SERPL-MCNC: 6.1 G/DL
PROT SERPL-MCNC: 6.3 G/DL
PROT SERPL-MCNC: 6.5 G/DL
PROT SERPL-MCNC: 6.6 G/DL
PROTHROMBIN TIME: 39.2 SEC
RBC # BLD AUTO: 2.69 M/UL
RBC # BLD AUTO: 2.72 M/UL
RBC # BLD AUTO: 2.72 M/UL
RBC # BLD AUTO: 2.77 M/UL
SODIUM SERPL-SCNC: 148 MMOL/L
SODIUM SERPL-SCNC: 149 MMOL/L
SODIUM SERPL-SCNC: 149 MMOL/L
SODIUM SERPL-SCNC: 153 MMOL/L
SODIUM SERPL-SCNC: 153 MMOL/L
WBC # BLD AUTO: 5.29 K/UL
WBC # BLD AUTO: 5.81 K/UL
WBC # BLD AUTO: 6.38 K/UL
WBC # BLD AUTO: 6.38 K/UL

## 2018-06-16 PROCEDURE — 63600175 PHARM REV CODE 636 W HCPCS: Performed by: STUDENT IN AN ORGANIZED HEALTH CARE EDUCATION/TRAINING PROGRAM

## 2018-06-16 PROCEDURE — 85730 THROMBOPLASTIN TIME PARTIAL: CPT

## 2018-06-16 PROCEDURE — 25000003 PHARM REV CODE 250: Performed by: STUDENT IN AN ORGANIZED HEALTH CARE EDUCATION/TRAINING PROGRAM

## 2018-06-16 PROCEDURE — 85025 COMPLETE CBC W/AUTO DIFF WBC: CPT | Mod: 91

## 2018-06-16 PROCEDURE — G8978 MOBILITY CURRENT STATUS: HCPCS | Mod: CM

## 2018-06-16 PROCEDURE — 80053 COMPREHEN METABOLIC PANEL: CPT | Mod: 91

## 2018-06-16 PROCEDURE — A4216 STERILE WATER/SALINE, 10 ML: HCPCS | Performed by: THORACIC SURGERY (CARDIOTHORACIC VASCULAR SURGERY)

## 2018-06-16 PROCEDURE — 94761 N-INVAS EAR/PLS OXIMETRY MLT: CPT

## 2018-06-16 PROCEDURE — 84100 ASSAY OF PHOSPHORUS: CPT

## 2018-06-16 PROCEDURE — G8979 MOBILITY GOAL STATUS: HCPCS | Mod: CK

## 2018-06-16 PROCEDURE — 25000003 PHARM REV CODE 250: Performed by: THORACIC SURGERY (CARDIOTHORACIC VASCULAR SURGERY)

## 2018-06-16 PROCEDURE — 80053 COMPREHEN METABOLIC PANEL: CPT

## 2018-06-16 PROCEDURE — 99233 SBSQ HOSP IP/OBS HIGH 50: CPT | Mod: ,,, | Performed by: SURGERY

## 2018-06-16 PROCEDURE — 63600175 PHARM REV CODE 636 W HCPCS: Performed by: PHYSICIAN ASSISTANT

## 2018-06-16 PROCEDURE — 25000003 PHARM REV CODE 250: Performed by: NURSE PRACTITIONER

## 2018-06-16 PROCEDURE — 27100171 HC OXYGEN HIGH FLOW UP TO 24 HOURS

## 2018-06-16 PROCEDURE — 83735 ASSAY OF MAGNESIUM: CPT | Mod: 91

## 2018-06-16 PROCEDURE — 97164 PT RE-EVAL EST PLAN CARE: CPT

## 2018-06-16 PROCEDURE — 85610 PROTHROMBIN TIME: CPT

## 2018-06-16 PROCEDURE — 25000003 PHARM REV CODE 250: Performed by: PHYSICIAN ASSISTANT

## 2018-06-16 PROCEDURE — 20000000 HC ICU ROOM

## 2018-06-16 RX ORDER — AMIODARONE HYDROCHLORIDE 200 MG/1
200 TABLET ORAL DAILY
Status: DISCONTINUED | OUTPATIENT
Start: 2018-06-17 | End: 2018-06-20 | Stop reason: HOSPADM

## 2018-06-16 RX ORDER — FUROSEMIDE 10 MG/ML
40 INJECTION INTRAMUSCULAR; INTRAVENOUS ONCE
Status: COMPLETED | OUTPATIENT
Start: 2018-06-16 | End: 2018-06-16

## 2018-06-16 RX ADMIN — Medication 800 MG: at 01:06

## 2018-06-16 RX ADMIN — FUROSEMIDE 20 MG/HR: 10 INJECTION, SOLUTION INTRAMUSCULAR; INTRAVENOUS at 12:06

## 2018-06-16 RX ADMIN — ATORVASTATIN CALCIUM 40 MG: 20 TABLET, FILM COATED ORAL at 08:06

## 2018-06-16 RX ADMIN — CEFTRIAXONE SODIUM 2 G: 2 INJECTION, POWDER, FOR SOLUTION INTRAMUSCULAR; INTRAVENOUS at 12:06

## 2018-06-16 RX ADMIN — Medication 3 ML: at 06:06

## 2018-06-16 RX ADMIN — Medication 10 ML: at 05:06

## 2018-06-16 RX ADMIN — Medication 10 ML: at 12:06

## 2018-06-16 RX ADMIN — AMPICILLIN 2 G: 2 INJECTION, POWDER, FOR SOLUTION INTRAVENOUS at 05:06

## 2018-06-16 RX ADMIN — HYDRALAZINE HYDROCHLORIDE 25 MG: 25 TABLET, FILM COATED ORAL at 09:06

## 2018-06-16 RX ADMIN — FAMOTIDINE 20 MG: 20 TABLET ORAL at 08:06

## 2018-06-16 RX ADMIN — POTASSIUM CHLORIDE 40 MEQ: 20 SOLUTION ORAL at 01:06

## 2018-06-16 RX ADMIN — RAMELTEON 8 MG: 8 TABLET, FILM COATED ORAL at 09:06

## 2018-06-16 RX ADMIN — Medication 3 ML: at 09:06

## 2018-06-16 RX ADMIN — Medication 800 MG: at 05:06

## 2018-06-16 RX ADMIN — CEFTRIAXONE SODIUM 2 G: 2 INJECTION, POWDER, FOR SOLUTION INTRAMUSCULAR; INTRAVENOUS at 11:06

## 2018-06-16 RX ADMIN — MIRTAZAPINE 15 MG: 15 TABLET, FILM COATED ORAL at 09:06

## 2018-06-16 RX ADMIN — POLYETHYLENE GLYCOL 3350 17 G: 17 POWDER, FOR SOLUTION ORAL at 08:06

## 2018-06-16 RX ADMIN — HYDRALAZINE HYDROCHLORIDE 25 MG: 25 TABLET, FILM COATED ORAL at 05:06

## 2018-06-16 RX ADMIN — INSULIN ASPART 1 UNITS: 100 INJECTION, SOLUTION INTRAVENOUS; SUBCUTANEOUS at 06:06

## 2018-06-16 RX ADMIN — FUROSEMIDE 40 MG: 10 INJECTION, SOLUTION INTRAMUSCULAR; INTRAVENOUS at 10:06

## 2018-06-16 RX ADMIN — INSULIN ASPART 1 UNITS: 100 INJECTION, SOLUTION INTRAVENOUS; SUBCUTANEOUS at 01:06

## 2018-06-16 RX ADMIN — FUROSEMIDE 20 MG/HR: 10 INJECTION, SOLUTION INTRAMUSCULAR; INTRAVENOUS at 11:06

## 2018-06-16 RX ADMIN — AMPICILLIN 2 G: 2 INJECTION, POWDER, FOR SOLUTION INTRAVENOUS at 09:06

## 2018-06-16 RX ADMIN — Medication 3 ML: at 02:06

## 2018-06-16 RX ADMIN — HYDRALAZINE HYDROCHLORIDE 25 MG: 25 TABLET, FILM COATED ORAL at 02:06

## 2018-06-16 RX ADMIN — AMPICILLIN 2 G: 2 INJECTION, POWDER, FOR SOLUTION INTRAVENOUS at 10:06

## 2018-06-16 RX ADMIN — AMIODARONE HYDROCHLORIDE 400 MG: 200 TABLET ORAL at 08:06

## 2018-06-16 RX ADMIN — DOXAZOSIN MESYLATE 4 MG: 2 TABLET ORAL at 09:06

## 2018-06-16 RX ADMIN — AMLODIPINE BESYLATE 10 MG: 10 TABLET ORAL at 08:06

## 2018-06-16 RX ADMIN — ASPIRIN 81 MG CHEWABLE TABLET 324 MG: 81 TABLET CHEWABLE at 08:06

## 2018-06-16 RX ADMIN — Medication 10 ML: at 06:06

## 2018-06-16 NOTE — PROGRESS NOTES
Notified Dr. Toledo of pt's lab results. No new orders at this time. Will continue to closely monitor.

## 2018-06-16 NOTE — SUBJECTIVE & OBJECTIVE
Interval History/Significant Events: No acute events overnight, afebrile, vital signs stable. Maintaining sats on 5L high flow NC; received 40mg of lasix yesterday. UOP 2505cc. Tolerating TFs     Follow-up For: Procedure(s) (LRB):  Mitral Valve Replacement (N/A)  AORTOCORONARY BYPASS-CABG (N/A)    Post-Operative Day: 22 Days Post-Op    Objective:     Vital Signs (Most Recent):  Temp: 98.3 °F (36.8 °C) (06/16/18 0300)  Pulse: 94 (06/16/18 0615)  Resp: (!) 28 (06/16/18 0615)  BP: (!) 122/58 (06/16/18 0615)  SpO2: 97 % (06/16/18 0615) Vital Signs (24h Range):  Temp:  [97.4 °F (36.3 °C)-98.3 °F (36.8 °C)] 98.3 °F (36.8 °C)  Pulse:  [76-94] 94  Resp:  [15-34] 28  SpO2:  [90 %-100 %] 97 %  BP: ()/(52-62) 122/58     Weight: 73.1 kg (161 lb 2.5 oz)  Body mass index is 23.12 kg/m².      Intake/Output Summary (Last 24 hours) at 06/16/18 0636  Last data filed at 06/16/18 0600   Gross per 24 hour   Intake             1943 ml   Output             4055 ml   Net            -2112 ml       Physical Exam   Constitutional: He appears well-developed and well-nourished.   Cardiovascular: Normal rate, regular rhythm and normal heart sounds.    Incision healing well   Pulmonary/Chest: Effort normal.   HFNC   Abdominal: Soft.   PEG intact   Neurological: He is alert.   Skin: Skin is warm, dry and intact.   Psychiatric: He has a normal mood and affect.       Lines/Drains/Airways     Peripherally Inserted Central Catheter Line                 PICC Double Lumen 03/05/18 1539 right basilic 102 days         PICC Double Lumen 06/04/18 1654 left brachial 11 days          Central Venous Catheter Line                 Percutaneous Central Line Insertion/Assessment - Quad lumen  05/25/18 0800 left subclavian 21 days          Drain                 Gastrostomy/Enterostomy 06/07/18 1657 Gastrostomy-jejunostomy LUQ 8 days         Urethral Catheter 06/11/18 1711 Latex 4 days          Line                 Pacer Wires 05/25/18 1355 21 days                 Significant Labs:    CBC/Anemia Profile:    Recent Labs  Lab 06/15/18  1753 06/15/18  2325 06/16/18  0500   WBC 5.77 6.92 6.38  6.38   HGB 8.6* 8.6* 8.2*  8.2*   HCT 27.5* 27.5* 26.2*  26.2*    178 167  167   MCV 96 97 96  96   RDW 16.1* 16.1* 16.0*  16.0*        Chemistries:    Recent Labs  Lab 06/15/18  0443 06/15/18  1315 06/15/18  1753 06/15/18  2325   * 149* 151* 148*   K 4.5 4.1 4.1 3.8    111* 110 110   CO2 30* 28 29 31*   BUN 56* 55* 56* 56*   CREATININE 2.0* 1.9* 1.9* 2.1*   CALCIUM 9.6 9.8 9.9 9.6   ALBUMIN 2.4* 2.6* 2.6* 2.6*   PROT 6.2 6.6 6.7 6.5   BILITOT 1.4* 1.9* 1.7* 1.7*   ALKPHOS 82 84 78 87   ALT 13 14 14 13   AST 20 20 20 21   MG 2.4  --  2.5 2.6   PHOS 3.8  --   --   --        Coagulation:   Recent Labs  Lab 06/16/18  0500   INR 4.0*   APTT 50.4*       Significant Imaging:  I have reviewed all pertinent imaging results/findings within the past 24 hours.

## 2018-06-16 NOTE — ASSESSMENT & PLAN NOTE
" 66yo M with PMH of mitral valve endocarditis and resultant severe MR, TR and pulmonary HTN who is now s/p IABP placement (5/24) and s/pMVR and CABG 5/25.     Plan:     Neuro:   - AAOx4 this AM.   - PRN oxy via J tube for pain control     Pulmonary:   - Currently on 5L high flow NC O2, wean as tolerated  - Continuous pulse ox.   - ABGs/CXR if distressed.   -monitor CXR, no significant change from prior  -CT Chest 6/15: "Diffuse ground-glass attenuation densities throughout bilateral lungs with an upper lobe predominance and subpleural sparing that is likely secondary to pulmonary edema, possibly acute on chronic in this patient with a recent history of mitral valve replacement.  Aspiration may be superimposed on pulmonary edema although we consider this less likely in light of symmetric distribution of the pulmonary disease.  Viral infection and pulmonary hemorrhage are also of concern although considered less likely."     Cardiac:  - MAP goal >65  - Paced at 80  - Home amlodipine, ASA, and statin daily.  - Coumadin therapy, INR 4.0 today, likely hold again  - Continuous cardiac monitoring.  - Pathology from valve returned with active subacute endocarditis. S/p ID consultation. PICC placed for long term abx administration.  Per ID Ampicillin and Ceftriaxone x 6 weeks for Enterococcal SBE (end date 7/13/18). In addition, patient will need ESR, CRP, CBC and CMP to be drawn weekly with results faxed to the ID Department at [phone] set up prior to discharge. Appreciate sw assistance.       Renal:   -UOP adequate.   -Bun/Cr stable, 56/2.1  -Flomax daily given home use.  - MIVF off     Fluids/Electrolytes/Nutrition/GI:   - NPO, re-start tube feeds, management per CTS  - Free water flushes    - failed MBSS  - Replace lytes PRN.  - Bowel regimen.     Hematology/Oncology:  - H/H stable, 8.2/26.2 Transfusion per primary team.  - INR daily, 4.0 today; consider holding coumadin  - CBC daily.     Infectious Disease: "   -Afebrile  -WBC wnl  -CBC daily   -Pathology from valve with active endocarditis, ID consulted, recommend Ampicillin and Ceftriaxone x 6 weeks for Enterococcal SBE (end date 7/13/18). In addition, patient will need ESR, CRP, CBC and CMP to be drawn weekly with results faxed to the ID Department at [phone] set up prior to discharge. Appreciate sw assistance.      Endocrine:  - No h/o DM or thyroid dysfunction  - s/p Endocrine evaluation. Continue following recs.   - Glucose goal of 120-180     Dispo:  - Continue ICU care  -[facility] primary

## 2018-06-16 NOTE — PROGRESS NOTES
Dr. Perez notified pt's UOP 45cc last hour after dose of diuril. Orders received for 40mg IV Lasix

## 2018-06-16 NOTE — PROGRESS NOTES
[facility]  Critical Care - Surgery  Progress Note    Patient Name: Rodolfo Messina  MRN: [id number]  Admission Date: 5/23/2018  Hospital Length of Stay: 24 days  Code Status: Full Code  Attending Provider: Marvin Go MD  Primary Care Provider: Deric Claudio MD   Principal Problem: S/P MVR (mitral valve replacement)    Subjective:     Interval History/Significant Events: No acute events overnight, afebrile, vital signs stable. Maintaining sats on 5L high flow NC; received 40mg of lasix yesterday. UOP 2505cc. Tolerating TFs     Follow-up For: Procedure(s) (LRB):  Mitral Valve Replacement (N/A)  AORTOCORONARY BYPASS-CABG (N/A)    Post-Operative Day: 22 Days Post-Op    Objective:     Vital Signs (Most Recent):  Temp: 98.3 °F (36.8 °C) (06/16/18 0300)  Pulse: 94 (06/16/18 0615)  Resp: (!) 28 (06/16/18 0615)  BP: (!) 122/58 (06/16/18 0615)  SpO2: 97 % (06/16/18 0615) Vital Signs (24h Range):  Temp:  [97.4 °F (36.3 °C)-98.3 °F (36.8 °C)] 98.3 °F (36.8 °C)  Pulse:  [76-94] 94  Resp:  [15-34] 28  SpO2:  [90 %-100 %] 97 %  BP: ()/(52-62) 122/58     Weight: 73.1 kg (161 lb 2.5 oz)  Body mass index is 23.12 kg/m².      Intake/Output Summary (Last 24 hours) at 06/16/18 0636  Last data filed at 06/16/18 0600   Gross per 24 hour   Intake             1943 ml   Output             4055 ml   Net            -2112 ml       Physical Exam   Constitutional: He appears well-developed and well-nourished.   Cardiovascular: Normal rate, regular rhythm and normal heart sounds.    Incision healing well   Pulmonary/Chest: Effort normal.   HFNC   Abdominal: Soft.   PEG intact   Neurological: He is alert.   Skin: Skin is warm, dry and intact.   Psychiatric: He has a normal mood and affect.       Lines/Drains/Airways     Peripherally Inserted Central Catheter Line                 PICC Double Lumen 03/05/18 1539 right basilic 102 days         PICC Double Lumen 06/04/18 1654 left brachial 11 days          Central  "Venous Catheter Line                 Percutaneous Central Line Insertion/Assessment - Quad lumen  05/25/18 0800 left subclavian 21 days          Drain                 Gastrostomy/Enterostomy 06/07/18 1657 Gastrostomy-jejunostomy LUQ 8 days         Urethral Catheter 06/11/18 1711 Latex 4 days          Line                 Pacer Wires 05/25/18 1355 21 days                Significant Labs:    CBC/Anemia Profile:    Recent Labs  Lab 06/15/18  1753 06/15/18  2325 06/16/18  0500   WBC 5.77 6.92 6.38  6.38   HGB 8.6* 8.6* 8.2*  8.2*   HCT 27.5* 27.5* 26.2*  26.2*    178 167  167   MCV 96 97 96  96   RDW 16.1* 16.1* 16.0*  16.0*        Chemistries:    Recent Labs  Lab 06/15/18  0443 06/15/18  1315 06/15/18  1753 06/15/18  2325   * 149* 151* 148*   K 4.5 4.1 4.1 3.8    111* 110 110   CO2 30* 28 29 31*   BUN 56* 55* 56* 56*   CREATININE 2.0* 1.9* 1.9* 2.1*   CALCIUM 9.6 9.8 9.9 9.6   ALBUMIN 2.4* 2.6* 2.6* 2.6*   PROT 6.2 6.6 6.7 6.5   BILITOT 1.4* 1.9* 1.7* 1.7*   ALKPHOS 82 84 78 87   ALT 13 14 14 13   AST 20 20 20 21   MG 2.4  --  2.5 2.6   PHOS 3.8  --   --   --        Coagulation:   Recent Labs  Lab 06/16/18  0500   INR 4.0*   APTT 50.4*       Significant Imaging:  I have reviewed all pertinent imaging results/findings within the past 24 hours.    Assessment/Plan:     Chronic systolic congestive heart failure     66yo M with PMH of mitral valve endocarditis and resultant severe MR, TR and pulmonary HTN who is now s/p IABP placement (5/24) and s/pMVR and CABG 5/25.     Plan:     Neuro:   - AAOx4 this AM.   - PRN oxy via J tube for pain control     Pulmonary:   - Currently on 5L high flow NC O2, wean as tolerated  - Continuous pulse ox.   - ABGs/CXR if distressed.   -monitor CXR, no significant change from prior  -CT Chest 6/15: "Diffuse ground-glass attenuation densities throughout bilateral lungs with an upper lobe predominance and subpleural sparing that is likely secondary to pulmonary edema, " "possibly acute on chronic in this patient with a recent history of mitral valve replacement.  Aspiration may be superimposed on pulmonary edema although we consider this less likely in light of symmetric distribution of the pulmonary disease.  Viral infection and pulmonary hemorrhage are also of concern although considered less likely."     Cardiac:  - MAP goal >65  - Paced at 80  - Home amlodipine, ASA, and statin daily.  - Coumadin therapy, INR 4.0 today, likely hold again  - Continuous cardiac monitoring.  - Pathology from valve returned with active subacute endocarditis. S/p ID consultation. PICC placed for long term abx administration.  Per ID Ampicillin and Ceftriaxone x 6 weeks for Enterococcal SBE (end date 7/13/18). In addition, patient will need ESR, CRP, CBC and CMP to be drawn weekly with results faxed to the ID Department at [phone] set up prior to discharge. Appreciate sw assistance.       Renal:   -UOP adequate.   -Bun/Cr stable, 56/2.1  -Flomax daily given home use.  - MIVF off     Fluids/Electrolytes/Nutrition/GI:   - NPO, re-start tube feeds, management per CTS  - Free water flushes    - failed MBSS  - Replace lytes PRN.  - Bowel regimen.     Hematology/Oncology:  - H/H stable, 8.2/26.2 Transfusion per primary team.  - INR daily, 4.0 today; consider holding coumadin  - CBC daily.     Infectious Disease:   -Afebrile  -WBC wnl  -CBC daily   -Pathology from valve with active endocarditis, ID consulted, recommend Ampicillin and Ceftriaxone x 6 weeks for Enterococcal SBE (end date 7/13/18). In addition, patient will need ESR, CRP, CBC and CMP to be drawn weekly with results faxed to the ID Department at [phone] set up prior to discharge. Appreciate sw assistance.      Endocrine:  - No h/o DM or thyroid dysfunction  - s/p Endocrine evaluation. Continue following recs.   - Glucose goal of 120-180     Dispo:  - Continue ICU care  -[facility] primary                  Critical care was time spent " personally by me on the following activities: development of treatment plan with patient or surrogate and bedside caregivers, discussions with consultants, evaluation of patient's response to treatment, examination of patient, ordering and performing treatments and interventions, ordering and review of laboratory studies, ordering and review of radiographic studies, pulse oximetry, re-evaluation of patient's condition.  This critical care time did not overlap with that of any other provider or involve time for any procedures.     Pepper Chadwick MD  Critical Care - Surgery  [facility]

## 2018-06-16 NOTE — SUBJECTIVE & OBJECTIVE
Interval History: No acute events overnight  Good response to 40 mg lasix x1  Sitting up in chair this am  BMx1     Medications:  Continuous Infusions:   dextrose 5 % Stopped (06/13/18 0551)     Scheduled Meds:   amiodarone  400 mg Per G Tube BID    Followed by    [START ON 6/20/2018] amiodarone  400 mg Per G Tube Daily    amLODIPine  10 mg Per G Tube Daily    ampicillin IVPB  2 g Intravenous Q6H    aspirin  324 mg Per G Tube Daily    atorvastatin  40 mg Per G Tube Daily    cefTRIAXone (ROCEPHIN) IVPB  2 g Intravenous Q12H    doxazosin  4 mg Per G Tube Daily    famotidine  20 mg Per G Tube Daily    furosemide  40 mg Intravenous Once    hydrALAZINE  25 mg Per G Tube Q8H    mirtazapine  15 mg Per G Tube QHS    polyethylene glycol  17 g Per G Tube Daily    ramelteon  8 mg Per G Tube QHS    sodium chloride 0.9%  10 mL Intravenous Q6H    sodium chloride 0.9%  3 mL Intravenous Q8H     PRN Meds:sodium chloride, albuterol-ipratropium, bisacodyl, dextromethorphan-guaifenesin  mg/5 ml, dextrose 50%, docusate, glucagon (human recombinant), hydrALAZINE, insulin aspart U-100, lactated ringers, magnesium oxide, magnesium oxide, metoclopramide HCl, ondansetron, potassium chloride 10%, potassium chloride 10%, potassium chloride 10%, potassium, sodium phosphates, potassium, sodium phosphates, potassium, sodium phosphates, sodium chloride, Flushing PICC Protocol **AND** sodium chloride 0.9% **AND** sodium chloride 0.9%, white petrolatum-mineral oil     Objective:     Vital Signs (Most Recent):  Temp: 97.5 °F (36.4 °C) (06/16/18 0700)  Pulse: 86 (06/16/18 0900)  Resp: (!) 26 (06/16/18 0900)  BP: (!) 103/54 (06/16/18 0900)  SpO2: 97 % (06/16/18 0900) Vital Signs (24h Range):  Temp:  [97.4 °F (36.3 °C)-98.3 °F (36.8 °C)] 97.5 °F (36.4 °C)  Pulse:  [76-94] 86  Resp:  [15-34] 26  SpO2:  [90 %-100 %] 97 %  BP: ()/(52-61) 103/54     Weight: 73.1 kg (161 lb 2.5 oz)  Body mass index is 23.12 kg/m².    SpO2: 97  %  O2 Device (Oxygen Therapy): High Flow nasal Cannula    Intake/Output - Last 3 Shifts       06/14 0700 - 06/15 0659 06/15 0700 - 06/16 0659 06/16 0700 - 06/17 0659    I.V. (mL/kg) 395 (5.1) 353 (4.8)     NG/ 1840 150    Total Intake(mL/kg) 1195 (15.5) 2193 (30) 150 (2.1)    Urine (mL/kg/hr) 2530 (1.4) 2505 (1.4) 185 (0.9)    Drains 350 (0.2) 1550 (0.9) 300 (1.5)    Total Output 2880 4055 485    Net -4505 -1862 -335           Stool Occurrence  2 x           Lines/Drains/Airways     Peripherally Inserted Central Catheter Line                 PICC Double Lumen 03/05/18 1539 right basilic 102 days         PICC Double Lumen 06/04/18 1654 left brachial 11 days          Central Venous Catheter Line                 Percutaneous Central Line Insertion/Assessment - Quad lumen  05/25/18 0800 left subclavian 22 days          Drain                 Gastrostomy/Enterostomy 06/07/18 1657 Gastrostomy-jejunostomy LUQ 8 days         Urethral Catheter 06/11/18 1711 Latex 4 days          Line                 Pacer Wires 05/25/18 1355 21 days                Physical Exam   Constitutional: He appears well-developed and well-nourished.   Cardiovascular: Normal rate, regular rhythm and normal heart sounds.    Incision healing well   Pulmonary/Chest: Effort normal.   5L NC   Abdominal: Soft.   PEG intact   Neurological: He is alert.   Skin: Skin is warm, dry and intact.   Psychiatric: He has a normal mood and affect.       Significant Labs:  All pertinent labs from the last 24 hours have been reviewed.    Significant Diagnostics:  I have reviewed all pertinent imaging results/findings within the past 24 hours.

## 2018-06-16 NOTE — PROGRESS NOTES
[facility]  Cardiothoracic Surgery  Progress Note    Patient Name: Rodolfo Messina  MRN: [id number]  Admission Date: 5/23/2018  Hospital Length of Stay: 24 days  Code Status: Full Code   Attending Physician: Marvin Go MD   Referring Provider: Marvin Go MD  Principal Problem:S/P MVR (mitral valve replacement)    Subjective:     Post-Op Info:  Procedure(s) (LRB):  Mitral Valve Replacement (N/A)  AORTOCORONARY BYPASS-CABG (N/A)   22 Days Post-Op     Interval History: No acute events overnight  Good response to 40 mg lasix x1  Sitting up in chair this am  BMx1     Medications:  Continuous Infusions:   dextrose 5 % Stopped (06/13/18 0551)     Scheduled Meds:   amiodarone  400 mg Per G Tube BID    Followed by    [START ON 6/20/2018] amiodarone  400 mg Per G Tube Daily    amLODIPine  10 mg Per G Tube Daily    ampicillin IVPB  2 g Intravenous Q6H    aspirin  324 mg Per G Tube Daily    atorvastatin  40 mg Per G Tube Daily    cefTRIAXone (ROCEPHIN) IVPB  2 g Intravenous Q12H    doxazosin  4 mg Per G Tube Daily    famotidine  20 mg Per G Tube Daily    furosemide  40 mg Intravenous Once    hydrALAZINE  25 mg Per G Tube Q8H    mirtazapine  15 mg Per G Tube QHS    polyethylene glycol  17 g Per G Tube Daily    ramelteon  8 mg Per G Tube QHS    sodium chloride 0.9%  10 mL Intravenous Q6H    sodium chloride 0.9%  3 mL Intravenous Q8H     PRN Meds:sodium chloride, albuterol-ipratropium, bisacodyl, dextromethorphan-guaifenesin  mg/5 ml, dextrose 50%, docusate, glucagon (human recombinant), hydrALAZINE, insulin aspart U-100, lactated ringers, magnesium oxide, magnesium oxide, metoclopramide HCl, ondansetron, potassium chloride 10%, potassium chloride 10%, potassium chloride 10%, potassium, sodium phosphates, potassium, sodium phosphates, potassium, sodium phosphates, sodium chloride, Flushing PICC Protocol **AND** sodium chloride 0.9% **AND** sodium chloride 0.9%, white  petrolatum-mineral oil     Objective:     Vital Signs (Most Recent):  Temp: 97.5 °F (36.4 °C) (06/16/18 0700)  Pulse: 86 (06/16/18 0900)  Resp: (!) 26 (06/16/18 0900)  BP: (!) 103/54 (06/16/18 0900)  SpO2: 97 % (06/16/18 0900) Vital Signs (24h Range):  Temp:  [97.4 °F (36.3 °C)-98.3 °F (36.8 °C)] 97.5 °F (36.4 °C)  Pulse:  [76-94] 86  Resp:  [15-34] 26  SpO2:  [90 %-100 %] 97 %  BP: ()/(52-61) 103/54     Weight: 73.1 kg (161 lb 2.5 oz)  Body mass index is 23.12 kg/m².    SpO2: 97 %  O2 Device (Oxygen Therapy): High Flow nasal Cannula    Intake/Output - Last 3 Shifts       06/14 0700 - 06/15 0659 06/15 0700 - 06/16 0659 06/16 0700 - 06/17 0659    I.V. (mL/kg) 395 (5.1) 353 (4.8)     NG/ 1840 150    Total Intake(mL/kg) 1195 (15.5) 2193 (30) 150 (2.1)    Urine (mL/kg/hr) 2530 (1.4) 2505 (1.4) 185 (0.9)    Drains 350 (0.2) 1550 (0.9) 300 (1.5)    Total Output 2880 4055 485    Net -6502 -8302 -335           Stool Occurrence  2 x           Lines/Drains/Airways     Peripherally Inserted Central Catheter Line                 PICC Double Lumen 03/05/18 1539 right basilic 102 days         PICC Double Lumen 06/04/18 1654 left brachial 11 days          Central Venous Catheter Line                 Percutaneous Central Line Insertion/Assessment - Quad lumen  05/25/18 0800 left subclavian 22 days          Drain                 Gastrostomy/Enterostomy 06/07/18 1657 Gastrostomy-jejunostomy LUQ 8 days         Urethral Catheter 06/11/18 1711 Latex 4 days          Line                 Pacer Wires 05/25/18 1355 21 days                Physical Exam   Constitutional: He appears well-developed and well-nourished.   Cardiovascular: Normal rate, regular rhythm and normal heart sounds.    Incision healing well   Pulmonary/Chest: Effort normal.   5L NC   Abdominal: Soft.   PEG intact   Neurological: He is alert.   Skin: Skin is warm, dry and intact.   Psychiatric: He has a normal mood and affect.       Significant Labs:  All  pertinent labs from the last 24 hours have been reviewed.    Significant Diagnostics:  I have reviewed all pertinent imaging results/findings within the past 24 hours.    Assessment/Plan:     * S/P MVR (mitral valve replacement)    --Continue ASA, statin (holding BB)  --ENT consult for epistaxis; concern for aspiration of old blood clots from sinuses  --CT chest consistent with volume overload  --HFNC satting well, wean as tolerated for O2 sat >92%  --Diuril x2 yesterday with little response; lasix 40 mg x1 with good response overnight  --Monitor H/H  --Sternal precautions  --PT/OT  --Ambulate x4  --monitor CXR    --Monitor electrolytes and replace prn  --Coumadin hold today again today  -- Q6  --continue  lackey   --strict I&Os  --did not pass modified swallow; PICC  --G-J tube placed; TF at goal        DISPO: Continue ICU care, discharge planning on going for rehab           Dysphagia    --SLP following  --failed last BMS study   --GJ tube placed by IR 6/7        Impaired functional mobility and endurance    --discharging to rehab  --PT/OT daily         On enteral nutrition    --TFs at goal        Epistaxis    --Noted to have post traumatic left sided epistaxis following attempted NGT placement. On the time of evaluation patient was hemostatic. No active bleeding noted.   -Vomited blood clots  -Given that patient is hemostatic, no acute intervention   -Recommend minimize nasal trauma as best possible  -If requires oxygen, ideal if it is humidified via face tent and not nasal canula  -Bacitracin to bilateral nares nightly  -Afrin BID x 3 days; complete  -Ocean nasal spray q6hrs while awake  -If bleeding restarts, hold pressure to fleshy portion of nose (10 mins) and lean forward  -For bleeding that does not stop, page resident  -Remainder of medical management per primary team        Atrial flutter    --EP following appreciate recs., will reach out to them- pt currently in Afib that began 6/6- Amio IV load  and infusion, now on PO amio  --holding BB        S/P CABG x 1    --see MVR         Chronic systolic congestive heart failure    - ASA, statin  - D/c beta blocker  - EP following; appreciate recs          ANNA (acute kidney injury)    --Elevated Cr  --holding Stuart Perez MD  Cardiothoracic Surgery  [facility]

## 2018-06-16 NOTE — ASSESSMENT & PLAN NOTE
--Continue ASA, statin (holding BB)  --ENT consult for epistaxis; concern for aspiration of old blood clots from sinuses  --CT chest consistent with volume overload  --HFNC satting well, wean as tolerated for O2 sat >92%  --Diuril x2 yesterday with little response; lasix 40 mg x1 with good response overnight  --Monitor H/H  --Sternal precautions  --PT/OT  --Ambulate x4  --monitor CXR    --Monitor electrolytes and replace prn  --Coumadin hold today again today  -- Q6  --continue  lackey   --strict I&Os  --did not pass modified swallow; PICC  --G-J tube placed; TF at goal        DISPO: Continue ICU care, discharge planning on going for rehab

## 2018-06-16 NOTE — PT/OT/SLP RE-EVAL
Physical Therapy Re-evaluation    Patient Name:  Rodolfo Messina   MRN:  [id number]    Recommendations:     Discharge Recommendations:  rehabilitation facility   Discharge Equipment Recommendations:  (will determine DME needs closer to discharge)   Barriers to discharge: Decreased caregiver support family may not be able to assist at current functional level.     Assessment:     Rodolfo Messina is a 68 y.o. male admitted with a medical diagnosis of S/P MVR (mitral valve replacement).  He presents with the following impairments/functional limitations:  weakness, impaired endurance, impaired functional mobilty, gait instability, impaired balance, decreased lower extremity function pt chad treatment well and will benefit from skilled PT 5x/wk to progress physically. Pt will need inpt rehab when medically stable to maximize rehab potential. Pt was evaluated 5/29 2nd s/p MVr, CABG 5/27. Pt was transferred to ICU with SOB and difficulty breathing.      Rehab Prognosis:  good; patient would benefit from acute skilled PT services to address these deficits and reach maximum level of function.      Recent Surgery: Procedure(s) (LRB):  Mitral Valve Replacement (N/A)  AORTOCORONARY BYPASS-CABG (N/A) 22 Days Post-Op    Plan:     During this hospitalization, patient to be seen 5 x/week to address the above listed problems via gait training, therapeutic activities, therapeutic exercises, neuromuscular re-education  · Plan of Care Expires:  07/13/18   Plan of Care Reviewed with: patient, spouse    Subjective     Communicated with nurse prior to session.  Patient found sitting in bedside chair upon PT entry to room, agreeable to evaluation.      Chief Complaint: pt had no complaints during treatment.   Patient comments/goals:  To get better and go home.   Pain/Comfort:  · Pain Rating 1: 0/10  · Pain Rating Post-Intervention 1: 0/10    Patients cultural, spiritual, Jainism conflicts given the current situation:  none      Objective:     Patient found with: blood pressure cuff, telemetry, pulse ox (continuous), lackey catheter, PEG Tube (lackey to abdomen)     General Precautions: Standard,  fall, sternal, NPO   Orthopedic Precautions:N/A   Braces:       Exams:  · Cognitive Exam:  Patient is oriented to Person, Place, Time and Situation   ·   · RLE ROM: WFL  · RLE Strength: WFL  · LLE ROM: WFL  · LLE Strength: WFL    Functional Mobility:  · Transfers:     · Sit to Stand:  contact guard assistance with no AD  ·   · Gait: pt received gait training ~ 112 ft with CGA and O2 intact. pt received gait training in room.     AM-PAC 6 CLICK MOBILITY  Total Score:17         Patient left up in chair with all lines intact, call button in reach and wife present.    GOALS:    Physical Therapy Goals        Problem: Physical Therapy Goal    Goal Priority Disciplines Outcome Goal Variances Interventions   Physical Therapy Goal     PT/OT, PT Ongoing (interventions implemented as appropriate)     Description:  Goals to be met by: 2018    Patient will increase functional independence with mobility by performin. Supine to sit with Supervision - not met  2. Sit to stand transfer with Supervision - not met  3. Bed to chair transfer with Supervision  - not met  4. Gait  x 250 feet with Supervision - not met  5. Lower extremity exercise program x20 reps per handout, with independence -  Not met                        History:     Past Medical History:   Diagnosis Date    ANNA (acute kidney injury) 2018    Anemia     Anxiety     Arthritis     BPH (benign prostatic hyperplasia)     CHF (congestive heart failure) 2018    Coronary artery disease of native artery of native heart with stable angina pectoris 2018    Diverticulosis     Encounter for blood transfusion     Hypertension     Urinary retention        Past Surgical History:   Procedure Laterality Date    APPENDECTOMY      COLONOSCOPY      COLONOSCOPY N/A  2/1/2018    Procedure: COLONOSCOPY;  Surgeon: Richar Payan MD;  Location: [facility] (4TH FLR);  Service: Endoscopy;  Laterality: N/A;  PM prep    CREATION OF AORTOCORONARY ARTERY BYPASS N/A 5/25/2018    Procedure: AORTOCORONARY BYPASS-CABG;  Surgeon: Marvin Go MD;  Location: [facility] OR 2ND FLR;  Service: Cardiovascular;  Laterality: N/A;    EYE SURGERY Right     cataract extraction    FINGER SURGERY      FRACTURE SURGERY Right     index finger    MITRAL VALVE REPLACEMENT N/A 5/25/2018    Procedure: Mitral Valve Replacement;  Surgeon: Marvin Go MD;  [facility];  Service: Cardiovascular;  Laterality: N/A;    TONSILLECTOMY         Clinical Decision Making:     History  Co-morbidities and personal factors that may impact the plan of care Examination  Body Structures and Functions, activity limitations and participation restrictions that may impact the plan of care Clinical Presentation   Decision Making/ Complexity Score   Co-morbidities:   [] Time since onset of injury / illness / exacerbation  [] Status of current condition  []Patient's cognitive status and safety concerns    [] Multiple Medical Problems (see med hx)  Personal Factors:   [] Patient's age  [] Prior Level of function   [] Patient's home situation (environment and family support)  [] Patient's level of motivation  [] Expected progression of patient      HISTORY:(criteria)    [] 46393 - no personal factors/history    [] 13140 - has 1-2 personal factor/comorbidity     [] 82720 - has >3 personal factor/comorbidity     Body Regions:  [] Objective examination findings  [] Head     []  Neck  [] Trunk   [] Upper Extremity  [] Lower Extremity    Body Systems:  [] For communication ability, affect, cognition, language, and learning style: the assessment of the ability to make needs known, consciousness, orientation (person, place, and time), expected emotional /behavioral responses, and learning preferences (eg, learning barriers,  education  needs)  [] For the neuromuscular system: a general assessment of gross coordinated movement (eg, balance, gait, locomotion, transfers, and transitions) and motor function  (motor control and motor learning)  [] For the musculoskeletal system: the assessment of gross symmetry, gross range of motion, gross strength, height, and weight  [] For the integumentary system: the assessment of pliability(texture), presence of scar formation, skin color, and skin integrity  [] For cardiovascular/pulmonary system: the assessment of heart rate, respiratory rate, blood pressure, and edema     Activity limitations:    [] Patient's cognitive status and saf ety concerns          [] Status of current condition      [] Weight bearing restriction  [] Cardiopulmunary Restriction    Participation Restrictions:   [] Goals and goal agreement with the patient     [] Rehab potential (prognosis) and probable outcome      Examination of Body System: (criteria)    [] 07430 - addressing 1-2 elements    [] 54957 - addressing a total of 3 or more elements     [] 51929 -  Addressing a total of 4 or more elements         Clinical Presentation: (criteria)  Choose one     On examination of body system using standardized tests and measures patient presents with (CHOOSE ONE) elements from any of the following: body structures and functions, activity limitations, and/or participation restrictions.  Leading to a clinical presentation that is considered (CHOOSE ONE)                              Clinical Decision Making  (Eval Complexity):  Choose One     Time Tracking:     PT Received On: 06/16/18  PT Start Time: 0903     PT Stop Time: 0917  PT Total Time (min): 14 min     Billable Minutes: Re-eval 14 min      Maria T Mazariegos, PT  06/16/2018

## 2018-06-16 NOTE — PLAN OF CARE
Problem: Physical Therapy Goal  Goal: Physical Therapy Goal  Goals to be met by: 2018    Patient will increase functional independence with mobility by performin. Supine to sit with Supervision - not met  2. Sit to stand transfer with Supervision - not met  3. Bed to chair transfer with Supervision  - not met  4. Gait  x 250 feet with Supervision - not met  5. Lower extremity exercise program x20 reps per handout, with independence -  Not met      Re-evaluation completed and goals appropriate. Maria T Mazariegos, PT  2018

## 2018-06-16 NOTE — PLAN OF CARE
Problem: Patient Care Overview  Goal: Plan of Care Review  Outcome: Ongoing (interventions implemented as appropriate)  No acute events overnight. VSS. No gtts. 40mg Lasix given, -200cc/hr. 1L output via G tube. TF remain at goal 50cc/hr, tolerating well. Pt ambulated in room multiple times with minimal assist. Pt AAOx4, follows commands and moves all extremities purposefully. Pt remains free of skin breakdown at this time. Pt up to chair this AM. Pt and spouse updated with plan of care, all questions answered.

## 2018-06-17 LAB
ALBUMIN SERPL BCP-MCNC: 2.4 G/DL
ALBUMIN SERPL BCP-MCNC: 2.6 G/DL
ALBUMIN SERPL BCP-MCNC: 2.7 G/DL
ALP SERPL-CCNC: 83 U/L
ALP SERPL-CCNC: 84 U/L
ALP SERPL-CCNC: 84 U/L
ALP SERPL-CCNC: 91 U/L
ALP SERPL-CCNC: 91 U/L
ALT SERPL W/O P-5'-P-CCNC: 11 U/L
ALT SERPL W/O P-5'-P-CCNC: 12 U/L
ALT SERPL W/O P-5'-P-CCNC: 12 U/L
ALT SERPL W/O P-5'-P-CCNC: 14 U/L
ALT SERPL W/O P-5'-P-CCNC: 15 U/L
ANION GAP SERPL CALC-SCNC: 11 MMOL/L
ANION GAP SERPL CALC-SCNC: 12 MMOL/L
ANION GAP SERPL CALC-SCNC: 13 MMOL/L
ANION GAP SERPL CALC-SCNC: 9 MMOL/L
ANION GAP SERPL CALC-SCNC: 9 MMOL/L
APTT BLDCRRT: 33.4 SEC
APTT BLDCRRT: 34.3 SEC
APTT BLDCRRT: 41.1 SEC
APTT BLDCRRT: 42.8 SEC
AST SERPL-CCNC: 18 U/L
AST SERPL-CCNC: 19 U/L
AST SERPL-CCNC: 19 U/L
AST SERPL-CCNC: 21 U/L
AST SERPL-CCNC: 22 U/L
BASOPHILS # BLD AUTO: 0.02 K/UL
BASOPHILS NFR BLD: 0.3 %
BILIRUB SERPL-MCNC: 1.5 MG/DL
BILIRUB SERPL-MCNC: 1.7 MG/DL
BILIRUB SERPL-MCNC: 1.9 MG/DL
BLD PROD TYP BPU: NORMAL
BLD PROD TYP BPU: NORMAL
BLOOD UNIT EXPIRATION DATE: NORMAL
BLOOD UNIT EXPIRATION DATE: NORMAL
BLOOD UNIT TYPE CODE: 6200
BLOOD UNIT TYPE CODE: 6200
BLOOD UNIT TYPE: NORMAL
BLOOD UNIT TYPE: NORMAL
BUN SERPL-MCNC: 68 MG/DL
BUN SERPL-MCNC: 69 MG/DL
BUN SERPL-MCNC: 69 MG/DL
BUN SERPL-MCNC: 76 MG/DL
BUN SERPL-MCNC: 77 MG/DL
CALCIUM SERPL-MCNC: 9.1 MG/DL
CALCIUM SERPL-MCNC: 9.3 MG/DL
CALCIUM SERPL-MCNC: 9.3 MG/DL
CALCIUM SERPL-MCNC: 9.5 MG/DL
CALCIUM SERPL-MCNC: 9.6 MG/DL
CHLORIDE SERPL-SCNC: 109 MMOL/L
CHLORIDE SERPL-SCNC: 109 MMOL/L
CHLORIDE SERPL-SCNC: 110 MMOL/L
CHLORIDE SERPL-SCNC: 110 MMOL/L
CHLORIDE SERPL-SCNC: 111 MMOL/L
CO2 SERPL-SCNC: 32 MMOL/L
CO2 SERPL-SCNC: 32 MMOL/L
CO2 SERPL-SCNC: 33 MMOL/L
CO2 SERPL-SCNC: 34 MMOL/L
CO2 SERPL-SCNC: 34 MMOL/L
CODING SYSTEM: NORMAL
CODING SYSTEM: NORMAL
CREAT SERPL-MCNC: 2 MG/DL
CREAT SERPL-MCNC: 2.4 MG/DL
CREAT SERPL-MCNC: 2.4 MG/DL
CREAT SERPL-MCNC: 2.6 MG/DL
CREAT SERPL-MCNC: 2.7 MG/DL
DIFFERENTIAL METHOD: ABNORMAL
DISPENSE STATUS: NORMAL
DISPENSE STATUS: NORMAL
EOSINOPHIL # BLD AUTO: 0.4 K/UL
EOSINOPHIL NFR BLD: 5.8 %
EOSINOPHIL NFR BLD: 6 %
EOSINOPHIL NFR BLD: 6.1 %
EOSINOPHIL NFR BLD: 6.2 %
EOSINOPHIL NFR BLD: 6.6 %
EOSINOPHIL NFR BLD: 6.6 %
ERYTHROCYTE [DISTWIDTH] IN BLOOD BY AUTOMATED COUNT: 15.6 %
ERYTHROCYTE [DISTWIDTH] IN BLOOD BY AUTOMATED COUNT: 15.8 %
ERYTHROCYTE [DISTWIDTH] IN BLOOD BY AUTOMATED COUNT: 15.9 %
EST. GFR  (AFRICAN AMERICAN): 26.8 ML/MIN/1.73 M^2
EST. GFR  (AFRICAN AMERICAN): 28 ML/MIN/1.73 M^2
EST. GFR  (AFRICAN AMERICAN): 30.9 ML/MIN/1.73 M^2
EST. GFR  (AFRICAN AMERICAN): 30.9 ML/MIN/1.73 M^2
EST. GFR  (AFRICAN AMERICAN): 38.5 ML/MIN/1.73 M^2
EST. GFR  (NON AFRICAN AMERICAN): 23.2 ML/MIN/1.73 M^2
EST. GFR  (NON AFRICAN AMERICAN): 24.3 ML/MIN/1.73 M^2
EST. GFR  (NON AFRICAN AMERICAN): 26.7 ML/MIN/1.73 M^2
EST. GFR  (NON AFRICAN AMERICAN): 26.7 ML/MIN/1.73 M^2
EST. GFR  (NON AFRICAN AMERICAN): 33.3 ML/MIN/1.73 M^2
GLUCOSE SERPL-MCNC: 117 MG/DL
GLUCOSE SERPL-MCNC: 117 MG/DL
GLUCOSE SERPL-MCNC: 127 MG/DL
GLUCOSE SERPL-MCNC: 147 MG/DL
GLUCOSE SERPL-MCNC: 148 MG/DL
HCT VFR BLD AUTO: 23.5 %
HCT VFR BLD AUTO: 24.8 %
HCT VFR BLD AUTO: 25.3 %
HCT VFR BLD AUTO: 25.3 %
HCT VFR BLD AUTO: 26.3 %
HCT VFR BLD AUTO: 26.7 %
HGB BLD-MCNC: 7.4 G/DL
HGB BLD-MCNC: 7.8 G/DL
HGB BLD-MCNC: 8 G/DL
IMM GRANULOCYTES # BLD AUTO: 0.01 K/UL
IMM GRANULOCYTES # BLD AUTO: 0.02 K/UL
IMM GRANULOCYTES NFR BLD AUTO: 0.2 %
IMM GRANULOCYTES NFR BLD AUTO: 0.3 %
INR PPP: 2.2
LYMPHOCYTES # BLD AUTO: 0.7 K/UL
LYMPHOCYTES # BLD AUTO: 0.7 K/UL
LYMPHOCYTES # BLD AUTO: 0.8 K/UL
LYMPHOCYTES NFR BLD: 10.4 %
LYMPHOCYTES NFR BLD: 11 %
LYMPHOCYTES NFR BLD: 12 %
LYMPHOCYTES NFR BLD: 12 %
LYMPHOCYTES NFR BLD: 12.2 %
LYMPHOCYTES NFR BLD: 13.2 %
MAGNESIUM SERPL-MCNC: 2.1 MG/DL
MAGNESIUM SERPL-MCNC: 2.2 MG/DL
MAGNESIUM SERPL-MCNC: 2.3 MG/DL
MAGNESIUM SERPL-MCNC: 2.4 MG/DL
MCH RBC QN AUTO: 29.4 PG
MCH RBC QN AUTO: 30.2 PG
MCH RBC QN AUTO: 30.5 PG
MCH RBC QN AUTO: 30.7 PG
MCH RBC QN AUTO: 30.7 PG
MCH RBC QN AUTO: 31 PG
MCHC RBC AUTO-ENTMCNC: 30 G/DL
MCHC RBC AUTO-ENTMCNC: 30.4 G/DL
MCHC RBC AUTO-ENTMCNC: 31.5 G/DL
MCHC RBC AUTO-ENTMCNC: 31.5 G/DL
MCHC RBC AUTO-ENTMCNC: 31.6 G/DL
MCHC RBC AUTO-ENTMCNC: 31.6 G/DL
MCV RBC AUTO: 97 FL
MCV RBC AUTO: 98 FL
MCV RBC AUTO: 98 FL
MCV RBC AUTO: 99 FL
MONOCYTES # BLD AUTO: 0.4 K/UL
MONOCYTES # BLD AUTO: 0.5 K/UL
MONOCYTES NFR BLD: 6.7 %
MONOCYTES NFR BLD: 7.6 %
MONOCYTES NFR BLD: 7.9 %
MONOCYTES NFR BLD: 8 %
MONOCYTES NFR BLD: 8 %
MONOCYTES NFR BLD: 8.1 %
NEUTROPHILS # BLD AUTO: 4.5 K/UL
NEUTROPHILS # BLD AUTO: 4.6 K/UL
NEUTROPHILS # BLD AUTO: 4.9 K/UL
NEUTROPHILS # BLD AUTO: 5 K/UL
NEUTROPHILS NFR BLD: 72.3 %
NEUTROPHILS NFR BLD: 72.8 %
NEUTROPHILS NFR BLD: 72.8 %
NEUTROPHILS NFR BLD: 72.9 %
NEUTROPHILS NFR BLD: 75.6 %
NEUTROPHILS NFR BLD: 75.7 %
NRBC BLD-RTO: 0 /100 WBC
PHOSPHATE SERPL-MCNC: 4.4 MG/DL
PLATELET # BLD AUTO: 158 K/UL
PLATELET # BLD AUTO: 171 K/UL
PLATELET # BLD AUTO: 174 K/UL
PLATELET # BLD AUTO: 175 K/UL
PLATELET # BLD AUTO: 175 K/UL
PLATELET # BLD AUTO: 180 K/UL
PMV BLD AUTO: 11.8 FL
PMV BLD AUTO: 11.9 FL
PMV BLD AUTO: 11.9 FL
PMV BLD AUTO: 12.1 FL
PMV BLD AUTO: 12.3 FL
PMV BLD AUTO: 12.3 FL
POCT GLUCOSE: 131 MG/DL (ref 70–110)
POCT GLUCOSE: 136 MG/DL (ref 70–110)
POCT GLUCOSE: 139 MG/DL (ref 70–110)
POCT GLUCOSE: 168 MG/DL (ref 70–110)
POCT GLUCOSE: 173 MG/DL (ref 70–110)
POTASSIUM SERPL-SCNC: 4 MMOL/L
POTASSIUM SERPL-SCNC: 4 MMOL/L
POTASSIUM SERPL-SCNC: 4.2 MMOL/L
POTASSIUM SERPL-SCNC: 4.3 MMOL/L
POTASSIUM SERPL-SCNC: 4.5 MMOL/L
PROT SERPL-MCNC: 6 G/DL
PROT SERPL-MCNC: 6.7 G/DL
PROT SERPL-MCNC: 6.8 G/DL
PROTHROMBIN TIME: 21.8 SEC
RBC # BLD AUTO: 2.39 M/UL
RBC # BLD AUTO: 2.56 M/UL
RBC # BLD AUTO: 2.61 M/UL
RBC # BLD AUTO: 2.61 M/UL
RBC # BLD AUTO: 2.65 M/UL
RBC # BLD AUTO: 2.72 M/UL
SODIUM SERPL-SCNC: 153 MMOL/L
SODIUM SERPL-SCNC: 153 MMOL/L
SODIUM SERPL-SCNC: 154 MMOL/L
TRANS ERYTHROCYTES VOL PATIENT: NORMAL ML
TRANS ERYTHROCYTES VOL PATIENT: NORMAL ML
WBC # BLD AUTO: 6.17 K/UL
WBC # BLD AUTO: 6.25 K/UL
WBC # BLD AUTO: 6.25 K/UL
WBC # BLD AUTO: 6.31 K/UL
WBC # BLD AUTO: 6.43 K/UL
WBC # BLD AUTO: 6.57 K/UL

## 2018-06-17 PROCEDURE — 20000000 HC ICU ROOM

## 2018-06-17 PROCEDURE — 99233 SBSQ HOSP IP/OBS HIGH 50: CPT | Mod: ,,, | Performed by: SURGERY

## 2018-06-17 PROCEDURE — 85610 PROTHROMBIN TIME: CPT

## 2018-06-17 PROCEDURE — 99900035 HC TECH TIME PER 15 MIN (STAT)

## 2018-06-17 PROCEDURE — 80053 COMPREHEN METABOLIC PANEL: CPT

## 2018-06-17 PROCEDURE — 25000003 PHARM REV CODE 250: Performed by: PHYSICIAN ASSISTANT

## 2018-06-17 PROCEDURE — 25000003 PHARM REV CODE 250: Performed by: THORACIC SURGERY (CARDIOTHORACIC VASCULAR SURGERY)

## 2018-06-17 PROCEDURE — 27000221 HC OXYGEN, UP TO 24 HOURS

## 2018-06-17 PROCEDURE — 80053 COMPREHEN METABOLIC PANEL: CPT | Mod: 91

## 2018-06-17 PROCEDURE — 83735 ASSAY OF MAGNESIUM: CPT

## 2018-06-17 PROCEDURE — 25000242 PHARM REV CODE 250 ALT 637 W/ HCPCS: Performed by: STUDENT IN AN ORGANIZED HEALTH CARE EDUCATION/TRAINING PROGRAM

## 2018-06-17 PROCEDURE — 63600175 PHARM REV CODE 636 W HCPCS: Performed by: PHYSICIAN ASSISTANT

## 2018-06-17 PROCEDURE — 25000003 PHARM REV CODE 250: Performed by: NURSE PRACTITIONER

## 2018-06-17 PROCEDURE — 85730 THROMBOPLASTIN TIME PARTIAL: CPT

## 2018-06-17 PROCEDURE — A4216 STERILE WATER/SALINE, 10 ML: HCPCS | Performed by: THORACIC SURGERY (CARDIOTHORACIC VASCULAR SURGERY)

## 2018-06-17 PROCEDURE — 94761 N-INVAS EAR/PLS OXIMETRY MLT: CPT

## 2018-06-17 PROCEDURE — 94640 AIRWAY INHALATION TREATMENT: CPT

## 2018-06-17 PROCEDURE — 84100 ASSAY OF PHOSPHORUS: CPT

## 2018-06-17 PROCEDURE — 99232 SBSQ HOSP IP/OBS MODERATE 35: CPT | Mod: ,,, | Performed by: INTERNAL MEDICINE

## 2018-06-17 PROCEDURE — 25000003 PHARM REV CODE 250: Performed by: STUDENT IN AN ORGANIZED HEALTH CARE EDUCATION/TRAINING PROGRAM

## 2018-06-17 PROCEDURE — 94799 UNLISTED PULMONARY SVC/PX: CPT

## 2018-06-17 PROCEDURE — 85025 COMPLETE CBC W/AUTO DIFF WBC: CPT | Mod: 91

## 2018-06-17 PROCEDURE — 83735 ASSAY OF MAGNESIUM: CPT | Mod: 91

## 2018-06-17 PROCEDURE — 85730 THROMBOPLASTIN TIME PARTIAL: CPT | Mod: 91

## 2018-06-17 RX ORDER — WARFARIN 1 MG/1
1 TABLET ORAL DAILY
Status: DISCONTINUED | OUTPATIENT
Start: 2018-06-17 | End: 2018-06-18

## 2018-06-17 RX ADMIN — Medication 3 ML: at 09:06

## 2018-06-17 RX ADMIN — AMPICILLIN 2 G: 2 INJECTION, POWDER, FOR SOLUTION INTRAVENOUS at 10:06

## 2018-06-17 RX ADMIN — HYDRALAZINE HYDROCHLORIDE 25 MG: 25 TABLET, FILM COATED ORAL at 09:06

## 2018-06-17 RX ADMIN — CEFTRIAXONE SODIUM 2 G: 2 INJECTION, POWDER, FOR SOLUTION INTRAMUSCULAR; INTRAVENOUS at 11:06

## 2018-06-17 RX ADMIN — Medication 10 ML: at 06:06

## 2018-06-17 RX ADMIN — AMPICILLIN 2 G: 2 INJECTION, POWDER, FOR SOLUTION INTRAVENOUS at 05:06

## 2018-06-17 RX ADMIN — Medication 10 ML: at 12:06

## 2018-06-17 RX ADMIN — HYDRALAZINE HYDROCHLORIDE 25 MG: 25 TABLET, FILM COATED ORAL at 05:06

## 2018-06-17 RX ADMIN — Medication 800 MG: at 01:06

## 2018-06-17 RX ADMIN — ASPIRIN 81 MG CHEWABLE TABLET 324 MG: 81 TABLET CHEWABLE at 08:06

## 2018-06-17 RX ADMIN — Medication 3 ML: at 02:06

## 2018-06-17 RX ADMIN — INSULIN ASPART 1 UNITS: 100 INJECTION, SOLUTION INTRAVENOUS; SUBCUTANEOUS at 12:06

## 2018-06-17 RX ADMIN — ATORVASTATIN CALCIUM 40 MG: 20 TABLET, FILM COATED ORAL at 08:06

## 2018-06-17 RX ADMIN — Medication 3 ML: at 06:06

## 2018-06-17 RX ADMIN — FAMOTIDINE 20 MG: 20 TABLET ORAL at 08:06

## 2018-06-17 RX ADMIN — ONDANSETRON 4 MG: 4 TABLET, ORALLY DISINTEGRATING ORAL at 03:06

## 2018-06-17 RX ADMIN — AMIODARONE HYDROCHLORIDE 200 MG: 200 TABLET ORAL at 08:06

## 2018-06-17 RX ADMIN — POLYETHYLENE GLYCOL 3350 17 G: 17 POWDER, FOR SOLUTION ORAL at 08:06

## 2018-06-17 RX ADMIN — Medication 800 MG: at 08:06

## 2018-06-17 RX ADMIN — RAMELTEON 8 MG: 8 TABLET, FILM COATED ORAL at 09:06

## 2018-06-17 RX ADMIN — IPRATROPIUM BROMIDE AND ALBUTEROL SULFATE 3 ML: .5; 3 SOLUTION RESPIRATORY (INHALATION) at 10:06

## 2018-06-17 RX ADMIN — INSULIN ASPART 1 UNITS: 100 INJECTION, SOLUTION INTRAVENOUS; SUBCUTANEOUS at 06:06

## 2018-06-17 RX ADMIN — HYDRALAZINE HYDROCHLORIDE 25 MG: 25 TABLET, FILM COATED ORAL at 01:06

## 2018-06-17 RX ADMIN — WARFARIN SODIUM 1 MG: 1 TABLET ORAL at 06:06

## 2018-06-17 RX ADMIN — DOXAZOSIN MESYLATE 4 MG: 2 TABLET ORAL at 08:06

## 2018-06-17 RX ADMIN — AMLODIPINE BESYLATE 10 MG: 10 TABLET ORAL at 08:06

## 2018-06-17 RX ADMIN — MIRTAZAPINE 15 MG: 15 TABLET, FILM COATED ORAL at 09:06

## 2018-06-17 RX ADMIN — GUAIFENESIN AND DEXTROMETHORPHAN 5 ML: 100; 10 SYRUP ORAL at 09:06

## 2018-06-17 NOTE — PLAN OF CARE
Problem: Patient Care Overview  Goal: Plan of Care Review  Outcome: Ongoing (interventions implemented as appropriate)  Pt remains on 2L nasal cannula this AM. Gtts: lasix. -350cc/hr. TF remain at goal 50cc/hr, pt tolerating well. Pt up to chair this AM. Pt AAOx4, follows commands and moves all extremities purposefully. Pt and spouse updated with plan of care.

## 2018-06-17 NOTE — PROGRESS NOTES
[facility]  Cardiothoracic Surgery  Progress Note    Patient Name: Rodolfo Messina  MRN: [id number]  Admission Date: 5/23/2018  Hospital Length of Stay: 25 days  Code Status: Full Code   Attending Physician: Marvin Go MD   Referring Provider: Marvin Go MD  Principal Problem:S/P MVR (mitral valve replacement)    Subjective:     Post-Op Info:  Procedure(s) (LRB):  Mitral Valve Replacement (N/A)  AORTOCORONARY BYPASS-CABG (N/A)   23 Days Post-Op     Interval History: Great UOP with lasix gtt  Denies SOB  Ambulating within room without assistance    Medications:  Continuous Infusions:   furosemide (LASIX) 2 mg/mL infusion (non-titrating) 5 mg/hr (06/17/18 1100)     Scheduled Meds:   amiodarone  200 mg Per G Tube Daily    amLODIPine  10 mg Per G Tube Daily    ampicillin IVPB  2 g Intravenous Q6H    aspirin  324 mg Per G Tube Daily    atorvastatin  40 mg Per G Tube Daily    cefTRIAXone (ROCEPHIN) IVPB  2 g Intravenous Q12H    doxazosin  4 mg Per G Tube Daily    famotidine  20 mg Per G Tube Daily    hydrALAZINE  25 mg Per G Tube Q8H    mirtazapine  15 mg Per G Tube QHS    polyethylene glycol  17 g Per G Tube Daily    ramelteon  8 mg Per G Tube QHS    sodium chloride 0.9%  10 mL Intravenous Q6H    sodium chloride 0.9%  3 mL Intravenous Q8H    warfarin  1 mg Per G Tube Daily     PRN Meds:sodium chloride, albuterol-ipratropium, bisacodyl, dextromethorphan-guaifenesin  mg/5 ml, dextrose 50%, docusate, glucagon (human recombinant), hydrALAZINE, insulin aspart U-100, lactated ringers, magnesium oxide, magnesium oxide, metoclopramide HCl, ondansetron, potassium chloride 10%, potassium chloride 10%, potassium chloride 10%, potassium, sodium phosphates, potassium, sodium phosphates, potassium, sodium phosphates, sodium chloride, Flushing PICC Protocol **AND** sodium chloride 0.9% **AND** sodium chloride 0.9%, white petrolatum-mineral oil     Objective:     Vital Signs (Most  Recent):  Temp: 98.5 °F (36.9 °C) (06/17/18 0700)  Pulse: 95 (06/17/18 1100)  Resp: 20 (06/17/18 1100)  BP: (!) 93/55 (06/17/18 1100)  SpO2: 100 % (06/17/18 1100) Vital Signs (24h Range):  Temp:  [97.6 °F (36.4 °C)-98.5 °F (36.9 °C)] 98.5 °F (36.9 °C)  Pulse:  [] 95  Resp:  [13-30] 20  SpO2:  [90 %-100 %] 100 %  BP: ()/(48-62) 93/55     Weight: 72.4 kg (159 lb 9.8 oz)  Body mass index is 22.9 kg/m².    SpO2: 100 %  O2 Device (Oxygen Therapy): room air    Intake/Output - Last 3 Shifts       06/15 0700 - 06/16 0659 06/16 0700 - 06/17 0659 06/17 0700 - 06/18 0659    I.V. (mL/kg) 353 (4.8) 621 (8.6)     NG/GT 1840 1750 250    Total Intake(mL/kg) 2193 (30) 2371 (32.7) 250 (3.5)    Urine (mL/kg/hr) 2505 (1.4) 4185 (2.4) 535 (1.5)    Drains 1550 (0.9) 300 (0.2)     Total Output 4055 4485 535    Net -1862 -2114 -285           Stool Occurrence 2 x 1 x           Lines/Drains/Airways     Peripherally Inserted Central Catheter Line                 PICC Double Lumen 03/05/18 1539 right basilic 103 days         PICC Double Lumen 06/04/18 1654 left brachial 12 days          Central Venous Catheter Line                 Percutaneous Central Line Insertion/Assessment - Quad lumen  05/25/18 0800 left subclavian 23 days          Drain                 Gastrostomy/Enterostomy 06/07/18 1657 Gastrostomy-jejunostomy LUQ 9 days         Urethral Catheter 06/11/18 1711 Latex 5 days          Line                 Pacer Wires 05/25/18 1355 22 days                Physical Exam   Constitutional: He appears well-developed and well-nourished.   Cardiovascular: Normal rate, regular rhythm and normal heart sounds.    Incision healing well   Pulmonary/Chest: Effort normal.   2L NC   Abdominal: Soft.   PEG intact   Neurological: He is alert.   Skin: Skin is warm, dry and intact.   Psychiatric: He has a normal mood and affect.       Significant Labs:  All pertinent labs from the last 24 hours have been reviewed.    Significant Diagnostics:  I  have reviewed all pertinent imaging results/findings within the past 24 hours.    Assessment/Plan:     * S/P MVR (mitral valve replacement)    --Continue ASA, statin (holding BB)  --ENT consult for epistaxis; concern for aspiration of old blood clots from sinuses  --CT chest consistent with volume overload  -- Satting well on NC, wean as tolerated for O2 sat >92%  -- Lasix gtt @ 10mg/hr  --Monitor H/H  --Sternal precautions  --PT/OT  --Ambulate x4  --monitor CXR    --Monitor electrolytes and replace prn  --Coumadin hold today again today  -- Q6  --continue  lackey   --strict I&Os  --did not pass modified swallow; PICC  --G-J tube placed; TF at goal        DISPO: Continue ICU care, discharge planning on going for rehab           Dysphagia    --SLP following  --failed last BMS study   --GJ tube placed by IR 6/7        Impaired functional mobility and endurance    --discharging to rehab  --PT/OT daily         On enteral nutrition    --TFs at goal; held for emesis of blood clots        Epistaxis    --Noted to have post traumatic left sided epistaxis following attempted NGT placement. On the time of evaluation patient was hemostatic. No active bleeding noted.   -Vomited blood clots; ENT to re-evaluate  -Given that patient is hemostatic, no acute intervention   -Recommend minimize nasal trauma as best possible  -If requires oxygen, ideal if it is humidified via face tent and not nasal canula  -Bacitracin to bilateral nares nightly  -Afrin BID x 3 days  -Ocean nasal spray q6hrs while awake  -If bleeding restarts, hold pressure to fleshy portion of nose (10 mins) and lean forward  -For bleeding that does not stop, page resident  -Remainder of medical management per primary team        Atrial flutter    --EP following appreciate recs., will reach out to them- pt currently in Afib that began 6/6- Amio IV load and infusion, now on PO amio  --holding BB        S/P CABG x 1    --see MVR         Chronic systolic congestive  heart failure    - ASA, statin  - D/c beta blocker  - EP following; appreciate recs           Clem Perez MD  Cardiothoracic Surgery  [facility]

## 2018-06-17 NOTE — SUBJECTIVE & OBJECTIVE
Interval History: Great UOP with lasix gtt  Denies SOB  Ambulating within room without assistance    Medications:  Continuous Infusions:   furosemide (LASIX) 2 mg/mL infusion (non-titrating) 5 mg/hr (06/17/18 1100)     Scheduled Meds:   amiodarone  200 mg Per G Tube Daily    amLODIPine  10 mg Per G Tube Daily    ampicillin IVPB  2 g Intravenous Q6H    aspirin  324 mg Per G Tube Daily    atorvastatin  40 mg Per G Tube Daily    cefTRIAXone (ROCEPHIN) IVPB  2 g Intravenous Q12H    doxazosin  4 mg Per G Tube Daily    famotidine  20 mg Per G Tube Daily    hydrALAZINE  25 mg Per G Tube Q8H    mirtazapine  15 mg Per G Tube QHS    polyethylene glycol  17 g Per G Tube Daily    ramelteon  8 mg Per G Tube QHS    sodium chloride 0.9%  10 mL Intravenous Q6H    sodium chloride 0.9%  3 mL Intravenous Q8H    warfarin  1 mg Per G Tube Daily     PRN Meds:sodium chloride, albuterol-ipratropium, bisacodyl, dextromethorphan-guaifenesin  mg/5 ml, dextrose 50%, docusate, glucagon (human recombinant), hydrALAZINE, insulin aspart U-100, lactated ringers, magnesium oxide, magnesium oxide, metoclopramide HCl, ondansetron, potassium chloride 10%, potassium chloride 10%, potassium chloride 10%, potassium, sodium phosphates, potassium, sodium phosphates, potassium, sodium phosphates, sodium chloride, Flushing PICC Protocol **AND** sodium chloride 0.9% **AND** sodium chloride 0.9%, white petrolatum-mineral oil     Objective:     Vital Signs (Most Recent):  Temp: 98.5 °F (36.9 °C) (06/17/18 0700)  Pulse: 95 (06/17/18 1100)  Resp: 20 (06/17/18 1100)  BP: (!) 93/55 (06/17/18 1100)  SpO2: 100 % (06/17/18 1100) Vital Signs (24h Range):  Temp:  [97.6 °F (36.4 °C)-98.5 °F (36.9 °C)] 98.5 °F (36.9 °C)  Pulse:  [] 95  Resp:  [13-30] 20  SpO2:  [90 %-100 %] 100 %  BP: ()/(48-62) 93/55     Weight: 72.4 kg (159 lb 9.8 oz)  Body mass index is 22.9 kg/m².    SpO2: 100 %  O2 Device (Oxygen Therapy): room air    Intake/Output -  Last 3 Shifts       06/15 0700 - 06/16 0659 06/16 0700 - 06/17 0659 06/17 0700 - 06/18 0659    I.V. (mL/kg) 353 (4.8) 621 (8.6)     NG/GT 1840 1750 250    Total Intake(mL/kg) 2193 (30) 2371 (32.7) 250 (3.5)    Urine (mL/kg/hr) 2505 (1.4) 4185 (2.4) 535 (1.5)    Drains 1550 (0.9) 300 (0.2)     Total Output 4055 4485 535    Net -1862 -2114 -285           Stool Occurrence 2 x 1 x           Lines/Drains/Airways     Peripherally Inserted Central Catheter Line                 PICC Double Lumen 03/05/18 1539 right basilic 103 days         PICC Double Lumen 06/04/18 1654 left brachial 12 days          Central Venous Catheter Line                 Percutaneous Central Line Insertion/Assessment - Quad lumen  05/25/18 0800 left subclavian 23 days          Drain                 Gastrostomy/Enterostomy 06/07/18 1657 Gastrostomy-jejunostomy LUQ 9 days         Urethral Catheter 06/11/18 1711 Latex 5 days          Line                 Pacer Wires 05/25/18 1355 22 days                Physical Exam   Constitutional: He appears well-developed and well-nourished.   Cardiovascular: Normal rate, regular rhythm and normal heart sounds.    Incision healing well   Pulmonary/Chest: Effort normal.   2L NC   Abdominal: Soft.   PEG intact   Neurological: He is alert.   Skin: Skin is warm, dry and intact.   Psychiatric: He has a normal mood and affect.       Significant Labs:  All pertinent labs from the last 24 hours have been reviewed.    Significant Diagnostics:  I have reviewed all pertinent imaging results/findings within the past 24 hours.

## 2018-06-17 NOTE — PROGRESS NOTES
Progress Note  Pulmonary Medicine    SUBJECTIVE     Reason for consult: acute respiratory failure    LOS: 25 days    Interval history  No acute events overnight. VSS, afebrile. Pt is off of oxygen and says his breathing is much improved.     Scheduled Medications   amiodarone  200 mg Per G Tube Daily    amLODIPine  10 mg Per G Tube Daily    ampicillin IVPB  2 g Intravenous Q6H    aspirin  324 mg Per G Tube Daily    atorvastatin  40 mg Per G Tube Daily    cefTRIAXone (ROCEPHIN) IVPB  2 g Intravenous Q12H    doxazosin  4 mg Per G Tube Daily    famotidine  20 mg Per G Tube Daily    hydrALAZINE  25 mg Per G Tube Q8H    mirtazapine  15 mg Per G Tube QHS    polyethylene glycol  17 g Per G Tube Daily    ramelteon  8 mg Per G Tube QHS    sodium chloride 0.9%  10 mL Intravenous Q6H    sodium chloride 0.9%  3 mL Intravenous Q8H    warfarin  1 mg Per G Tube Daily       Medications PRN  sodium chloride, albuterol-ipratropium, bisacodyl, dextromethorphan-guaifenesin  mg/5 ml, dextrose 50%, docusate, glucagon (human recombinant), hydrALAZINE, insulin aspart U-100, lactated ringers, magnesium oxide, magnesium oxide, metoclopramide HCl, ondansetron, potassium chloride 10%, potassium chloride 10%, potassium chloride 10%, potassium, sodium phosphates, potassium, sodium phosphates, potassium, sodium phosphates, sodium chloride, Flushing PICC Protocol **AND** sodium chloride 0.9% **AND** sodium chloride 0.9%, white petrolatum-mineral oil      OBJECTIVE     Temp:  [97.6 °F (36.4 °C)-98.5 °F (36.9 °C)]   Pulse:  []   Resp:  [13-30]   BP: ()/(48-62)   SpO2:  [90 %-100 %]     Vitals:    06/17/18 1300   BP: (!) 100/55   Pulse: 92   Resp: (!) 21   Temp:        I & O (Last 24H)  I/O last 3 completed shifts:  In: 3837 [I.V.:937; NG/GT:2900]  Out: 7375 [Urine:6075; Drains:1300]    I/O this shift:  In: 300 [NG/GT:300]  Out: 555 [Urine:555]      Physical Exam:  Constitutional: Patient is oriented to person, place,  and time. Patient appears well-developed and well-nourished. No distress.   Cardiovascular: Normal rate, regular rhythm, normal heart sounds and intact distal pulses.   Pulmonary/Chest: Effort normal. No distress. Patient has no wheezes. Patient has mild bibasilar rales.   Abdominal: Soft. Bowel sounds are normal.   Musculoskeletal: Patient exhibits no edema or tenderness.   Neurological: Patient is alert and oriented to person, place, and time.   Skin: Skin is warm and dry. No rash noted. Patient is not diaphoretic. No erythema. No pallor.   Psychiatric: Patient has a normal mood and affect. Behavior is normal. Judgment and thought content normal.     Laboratory:    Recent Labs  Lab 06/15/18  0443 06/16/18  0500 06/17/18  0500   INR 3.9* 4.0* 2.2*       Recent Labs  Lab 06/17/18  0000 06/17/18  0500 06/17/18  1105   WBC 6.17 6.25  6.25 6.43   HGB 7.8* 8.0*  8.0* 8.0*   HCT 24.8* 25.3*  25.3* 26.7*    175  175 174     Lab Results   Component Value Date    TSH 0.833 01/28/2018     Recent Labs  Lab 06/17/18  0000 06/17/18  0500 06/17/18  1105   * 153*  153* 154*   K 4.3 4.0  4.0 4.2   * 110  110 109   CO2 32* 34*  34* 32*   BUN 68* 69*  69* 76*   CREATININE 2.0* 2.4*  2.4* 2.6*   CALCIUM 9.1 9.3  9.3 9.5   PROT 6.0 6.0  6.0 6.7   BILITOT 1.7* 1.7*  1.7* 1.9*   ALKPHOS 83 84  84 91   ALT 11 12  12 14   AST 18 19  19 22       Recent Labs  Lab 06/17/18  0000 06/17/18  0500 06/17/18  1105   MG 2.1 2.2 2.3     Lab Results   Component Value Date    CALCIUM 9.5 06/17/2018    PHOS 4.4 06/17/2018         No results for input(s): CPK, CPKMB, TROPONINI, MB in the last 168 hours.    ABG    Recent Labs  Lab 06/13/18  0558   PH 7.563*   PO2 138*   PCO2 43.7   HCO3 39.3*   BE 17       Diagnostic Results:  Reviewed. Little change on CXR today.       Assessment / Recommendations       1. Acute hypoxemic respiratory failure  2. Bilateral infiltrates on CXR  3. Suspected cardiogenic pulmonary  edema  4. Suspected chronic aspiration, s/p PEG placement  5. Epistaxis, resolved  6. Supratherapeutic INR    ? Altogether, his case is suggestive of pulmonary edema vs transfusion-related lung injury from PRBC received around 6/13, the time at which his O2 requirement and CXR infiltrates began increasing.  ? Less likely are amio toxicity (dose decreased yesterday) or other pneumonitis.   ? Pt now comfortable with no supplemental O2.   ? Agree with continued diuresis and ABX, per primary team.  ? Repeat CXR in 2-3 days.   ? Reassessment of swallow function per primary team.       Pt seen and plan discussed with Dr. Guevara, staff. Pulmonary will sign off. Thank you for this consult.     Homar Paris MD  Fellow, Pulmonary & Critical Care Medicine  spectralink [phone]

## 2018-06-17 NOTE — ASSESSMENT & PLAN NOTE
--Continue ASA, statin (holding BB)  --ENT consult for epistaxis; concern for aspiration of old blood clots from sinuses  --CT chest consistent with volume overload  -- Satting well on NC, wean as tolerated for O2 sat >92%  -- Lasix gtt @ 10mg/hr  --Monitor H/H  --Sternal precautions  --PT/OT  --Ambulate x4  --monitor CXR    --Monitor electrolytes and replace prn  --Coumadin hold today again today  -- Q6  --continue  lackey   --strict I&Os  --did not pass modified swallow; PICC  --G-J tube placed; TF at goal        DISPO: Continue ICU care, discharge planning on going for rehab

## 2018-06-17 NOTE — ASSESSMENT & PLAN NOTE
" 66yo M with PMH of mitral valve endocarditis and resultant severe MR, TR and pulmonary HTN who is now s/p IABP placement (5/24) and s/pMVR and CABG 5/25.     Plan:     Neuro:   - AAOx4 this AM.   - PRN oxy via J tube for pain control     Pulmonary:   - Currently on 2L NC O2, wean as tolerated  - Continuous pulse ox.   - ABGs/CXR if distressed.   -monitor CXR, no significant change from prior  -CT Chest 6/15: "Diffuse ground-glass attenuation densities throughout bilateral lungs with an upper lobe predominance and subpleural sparing that is likely secondary to pulmonary edema, possibly acute on chronic in this patient with a recent history of mitral valve replacement.  Aspiration may be superimposed on pulmonary edema although we consider this less likely in light of symmetric distribution of the pulmonary disease.  Viral infection and pulmonary hemorrhage are also of concern although considered less likely."     Cardiac:  - MAP goal >65  - Paced at 80  - Home amlodipine, ASA, and statin daily.  - Coumadin therapy, INR 2.2 today  - Continuous cardiac monitoring.  - Pathology from valve returned with active subacute endocarditis. S/p ID consultation. PICC placed for long term abx administration.  Per ID Ampicillin and Ceftriaxone x 6 weeks for Enterococcal SBE (end date 7/13/18). In addition, patient will need ESR, CRP, CBC and CMP to be drawn weekly with results faxed to the ID Department at [phone] set up prior to discharge. Appreciate sw assistance.       Renal:   -UOP adequate.   -Bun/Cr stable, 69/2.4  -Flomax daily given home use.  - MIVF off     Fluids/Electrolytes/Nutrition/GI:   - NPO, re-start tube feeds, management per CTS  - Free water flushes    - failed MBSS  - Replace lytes PRN.  - Bowel regimen.     Hematology/Oncology:  - H/H stable, 8.0/25.3 Transfusion per primary team.  - INR daily, 2.2 today  - CBC daily.     Infectious Disease:   -Afebrile  -WBC wnl  -CBC daily   -Pathology from valve with " active endocarditis, ID consulted, recommend Ampicillin and Ceftriaxone x 6 weeks for Enterococcal SBE (end date 7/13/18). In addition, patient will need ESR, CRP, CBC and CMP to be drawn weekly with results faxed to the ID Department at [phone] set up prior to discharge. Appreciate sw assistance.      Endocrine:  - No h/o DM or thyroid dysfunction  - s/p Endocrine evaluation. Continue following recs.   - Glucose goal of 120-180     Dispo:  - Continue ICU care  -[facility] primary

## 2018-06-17 NOTE — PROGRESS NOTES
[facility]  Critical Care - Surgery  Progress Note    Patient Name: Rodolfo Messina  MRN: [id number]  Admission Date: 5/23/2018  Hospital Length of Stay: 25 days  Code Status: Full Code  Attending Provider: Marvin Go MD  Primary Care Provider: Deric Claudio MD   Principal Problem: S/P MVR (mitral valve replacement)    Subjective:     Interval History/Significant Events: No acute events overnight, afebrile, vital signs stable. Maintaining sats on 2L NC, on lasix gtt. UOP 4225cc. Tolerating TFs     Follow-up For: Procedure(s) (LRB):  Mitral Valve Replacement (N/A)  AORTOCORONARY BYPASS-CABG (N/A)    Post-Operative Day: 23 Days Post-Op    Objective:     Vital Signs (Most Recent):  Temp: 98.5 °F (36.9 °C) (06/17/18 0700)  Pulse: 95 (06/17/18 0700)  Resp: (!) 21 (06/17/18 0700)  BP: (!) 102/56 (06/17/18 0700)  SpO2: 99 % (06/17/18 0700) Vital Signs (24h Range):  Temp:  [97.6 °F (36.4 °C)-98.5 °F (36.9 °C)] 98.5 °F (36.9 °C)  Pulse:  [] 95  Resp:  [13-30] 21  SpO2:  [90 %-100 %] 99 %  BP: ()/(48-62) 102/56     Weight: 72.4 kg (159 lb 9.8 oz)  Body mass index is 22.9 kg/m².      Intake/Output Summary (Last 24 hours) at 06/17/18 0732  Last data filed at 06/17/18 0700   Gross per 24 hour   Intake             2371 ml   Output             4525 ml   Net            -2154 ml       Physical Exam   Constitutional: He appears well-developed and well-nourished.   Cardiovascular: Normal rate, regular rhythm and normal heart sounds.    Incision healing well   Pulmonary/Chest: Effort normal.   5L NC   Abdominal: Soft.   PEG intact   Neurological: He is alert.   Skin: Skin is warm, dry and intact.   Psychiatric: He has a normal mood and affect.       Vents:  Vent Mode: Spont (05/26/18 2025)  Ventilator Initiated: Yes (05/25/18 1509)  Set Rate: 0 bmp (05/26/18 2025)  Vt Set: 500 mL (05/26/18 2025)  Pressure Support: 10 cmH20 (05/26/18 2025)  PEEP/CPAP: 5 cmH20 (05/26/18 2025)  Oxygen Concentration  (%): 24 (05/29/18 0402)  Peak Airway Pressure: 16 cmH2O (05/26/18 2025)  Plateau Pressure: 0 cmH20 (05/26/18 2025)  Total Ve: 11.8 mL (05/26/18 2025)  F/VT Ratio<105 (RSBI): (!) 32.76 (05/26/18 2025)    Lines/Drains/Airways     Peripherally Inserted Central Catheter Line                 PICC Double Lumen 03/05/18 1539 right basilic 103 days         PICC Double Lumen 06/04/18 1654 left brachial 12 days          Central Venous Catheter Line                 Percutaneous Central Line Insertion/Assessment - Quad lumen  05/25/18 0800 left subclavian 22 days          Drain                 Gastrostomy/Enterostomy 06/07/18 1657 Gastrostomy-jejunostomy LUQ 9 days         Urethral Catheter 06/11/18 1711 Latex 5 days          Line                 Pacer Wires 05/25/18 1355 22 days                Significant Labs:    CBC/Anemia Profile:    Recent Labs  Lab 06/16/18  1723 06/17/18  0000 06/17/18  0500   WBC 5.81 6.17 6.25  6.25   HGB 8.5* 7.8* 8.0*  8.0*   HCT 26.7* 24.8* 25.3*  25.3*    171 175  175   MCV 96 97 97  97   RDW 15.9* 15.9* 15.9*  15.9*        Chemistries:    Recent Labs  Lab 06/16/18  0500  06/16/18  1723 06/17/18  0000 06/17/18  0500   *  149*  < > 153* 154* 153*  153*   K 4.3  4.3  < > 4.1 4.3 4.0  4.0   *  111*  < > 110 111* 110  110   CO2 32*  32*  < > 33* 32* 34*  34*   BUN 58*  58*  < > 65* 68* 69*  69*   CREATININE 2.1*  2.1*  < > 2.3* 2.0* 2.4*  2.4*   CALCIUM 9.7  9.7  < > 9.7 9.1 9.3  9.3   ALBUMIN 2.4*  2.4*  < > 2.6* 2.4* 2.4*  2.4*   PROT 6.1  6.1  < > 6.6 6.0 6.0  6.0   BILITOT 1.4*  1.4*  < > 1.9* 1.7* 1.7*  1.7*   ALKPHOS 80  80  < > 85 83 84  84   ALT 12  12  < > 13 11 12  12   AST 23  23  < > 18 18 19  19   MG 2.5  < > 2.4 2.1 2.2   PHOS 3.9  --   --   --  4.4   < > = values in this interval not displayed.    Coagulation:   Recent Labs  Lab 06/17/18  0500   INR 2.2*   APTT 41.1*       Significant Imaging:  I have reviewed all pertinent imaging  "results/findings within the past 24 hours.    Assessment/Plan:     Chronic systolic congestive heart failure     68yo M with PMH of mitral valve endocarditis and resultant severe MR, TR and pulmonary HTN who is now s/p IABP placement (5/24) and s/pMVR and CABG 5/25.     Plan:     Neuro:   - AAOx4 this AM.   - PRN oxy via J tube for pain control     Pulmonary:   - Currently on 2L NC O2, wean as tolerated  - Continuous pulse ox.   - ABGs/CXR if distressed.   -monitor CXR, no significant change from prior  -CT Chest 6/15: "Diffuse ground-glass attenuation densities throughout bilateral lungs with an upper lobe predominance and subpleural sparing that is likely secondary to pulmonary edema, possibly acute on chronic in this patient with a recent history of mitral valve replacement.  Aspiration may be superimposed on pulmonary edema although we consider this less likely in light of symmetric distribution of the pulmonary disease.  Viral infection and pulmonary hemorrhage are also of concern although considered less likely."     Cardiac:  - MAP goal >65  - Paced at 80  - Home amlodipine, ASA, and statin daily.  - Coumadin therapy, INR 2.2 today  - Continuous cardiac monitoring.  - Pathology from valve returned with active subacute endocarditis. S/p ID consultation. PICC placed for long term abx administration.  Per ID Ampicillin and Ceftriaxone x 6 weeks for Enterococcal SBE (end date 7/13/18). In addition, patient will need ESR, CRP, CBC and CMP to be drawn weekly with results faxed to the ID Department at [phone] set up prior to discharge. Appreciate sw assistance.       Renal:   -UOP adequate.   -Bun/Cr stable, 69/2.4  -Flomax daily given home use.  - MIVF off     Fluids/Electrolytes/Nutrition/GI:   - NPO, re-start tube feeds, management per CTS  - Free water flushes    - failed MBSS  - Replace lytes PRN.  - Bowel regimen.     Hematology/Oncology:  - H/H stable, 8.0/25.3 Transfusion per primary team.  - INR daily, " 2.2 today  - CBC daily.     Infectious Disease:   -Afebrile  -WBC wnl  -CBC daily   -Pathology from valve with active endocarditis, ID consulted, recommend Ampicillin and Ceftriaxone x 6 weeks for Enterococcal SBE (end date 7/13/18). In addition, patient will need ESR, CRP, CBC and CMP to be drawn weekly with results faxed to the ID Department at [phone] set up prior to discharge. Appreciate sw assistance.      Endocrine:  - No h/o DM or thyroid dysfunction  - s/p Endocrine evaluation. Continue following recs.   - Glucose goal of 120-180     Dispo:  - Continue ICU care  -[facility] primary                  Critical care was time spent personally by me on the following activities: development of treatment plan with patient or surrogate and bedside caregivers, discussions with consultants, evaluation of patient's response to treatment, examination of patient, ordering and performing treatments and interventions, ordering and review of laboratory studies, ordering and review of radiographic studies, pulse oximetry, re-evaluation of patient's condition.  This critical care time did not overlap with that of any other provider or involve time for any procedures.     Pepper Chadwick MD  Critical Care - Surgery  [facility]

## 2018-06-17 NOTE — SUBJECTIVE & OBJECTIVE
Interval History/Significant Events: No acute events overnight, afebrile, vital signs stable. Maintaining sats on 2L NC, on lasix gtt. UOP 4225cc. Tolerating TFs     Follow-up For: Procedure(s) (LRB):  Mitral Valve Replacement (N/A)  AORTOCORONARY BYPASS-CABG (N/A)    Post-Operative Day: 23 Days Post-Op    Objective:     Vital Signs (Most Recent):  Temp: 98.5 °F (36.9 °C) (06/17/18 0700)  Pulse: 95 (06/17/18 0700)  Resp: (!) 21 (06/17/18 0700)  BP: (!) 102/56 (06/17/18 0700)  SpO2: 99 % (06/17/18 0700) Vital Signs (24h Range):  Temp:  [97.6 °F (36.4 °C)-98.5 °F (36.9 °C)] 98.5 °F (36.9 °C)  Pulse:  [] 95  Resp:  [13-30] 21  SpO2:  [90 %-100 %] 99 %  BP: ()/(48-62) 102/56     Weight: 72.4 kg (159 lb 9.8 oz)  Body mass index is 22.9 kg/m².      Intake/Output Summary (Last 24 hours) at 06/17/18 0732  Last data filed at 06/17/18 0700   Gross per 24 hour   Intake             2371 ml   Output             4525 ml   Net            -2154 ml       Physical Exam   Constitutional: He appears well-developed and well-nourished.   Cardiovascular: Normal rate, regular rhythm and normal heart sounds.    Incision healing well   Pulmonary/Chest: Effort normal.   5L NC   Abdominal: Soft.   PEG intact   Neurological: He is alert.   Skin: Skin is warm, dry and intact.   Psychiatric: He has a normal mood and affect.       Vents:  Vent Mode: Spont (05/26/18 2025)  Ventilator Initiated: Yes (05/25/18 1509)  Set Rate: 0 bmp (05/26/18 2025)  Vt Set: 500 mL (05/26/18 2025)  Pressure Support: 10 cmH20 (05/26/18 2025)  PEEP/CPAP: 5 cmH20 (05/26/18 2025)  Oxygen Concentration (%): 24 (05/29/18 0402)  Peak Airway Pressure: 16 cmH2O (05/26/18 2025)  Plateau Pressure: 0 cmH20 (05/26/18 2025)  Total Ve: 11.8 mL (05/26/18 2025)  F/VT Ratio<105 (RSBI): (!) 32.76 (05/26/18 2025)    Lines/Drains/Airways     Peripherally Inserted Central Catheter Line                 PICC Double Lumen 03/05/18 1539 right basilic 103 days         PICC Double  Lumen 06/04/18 1654 left brachial 12 days          Central Venous Catheter Line                 Percutaneous Central Line Insertion/Assessment - Quad lumen  05/25/18 0800 left subclavian 22 days          Drain                 Gastrostomy/Enterostomy 06/07/18 1657 Gastrostomy-jejunostomy LUQ 9 days         Urethral Catheter 06/11/18 1711 Latex 5 days          Line                 Pacer Wires 05/25/18 1355 22 days                Significant Labs:    CBC/Anemia Profile:    Recent Labs  Lab 06/16/18  1723 06/17/18  0000 06/17/18  0500   WBC 5.81 6.17 6.25  6.25   HGB 8.5* 7.8* 8.0*  8.0*   HCT 26.7* 24.8* 25.3*  25.3*    171 175  175   MCV 96 97 97  97   RDW 15.9* 15.9* 15.9*  15.9*        Chemistries:    Recent Labs  Lab 06/16/18  0500  06/16/18  1723 06/17/18  0000 06/17/18  0500   *  149*  < > 153* 154* 153*  153*   K 4.3  4.3  < > 4.1 4.3 4.0  4.0   *  111*  < > 110 111* 110  110   CO2 32*  32*  < > 33* 32* 34*  34*   BUN 58*  58*  < > 65* 68* 69*  69*   CREATININE 2.1*  2.1*  < > 2.3* 2.0* 2.4*  2.4*   CALCIUM 9.7  9.7  < > 9.7 9.1 9.3  9.3   ALBUMIN 2.4*  2.4*  < > 2.6* 2.4* 2.4*  2.4*   PROT 6.1  6.1  < > 6.6 6.0 6.0  6.0   BILITOT 1.4*  1.4*  < > 1.9* 1.7* 1.7*  1.7*   ALKPHOS 80  80  < > 85 83 84  84   ALT 12  12  < > 13 11 12  12   AST 23  23  < > 18 18 19  19   MG 2.5  < > 2.4 2.1 2.2   PHOS 3.9  --   --   --  4.4   < > = values in this interval not displayed.    Coagulation:   Recent Labs  Lab 06/17/18  0500   INR 2.2*   APTT 41.1*       Significant Imaging:  I have reviewed all pertinent imaging results/findings within the past 24 hours.

## 2018-06-18 LAB
ALBUMIN SERPL BCP-MCNC: 2.3 G/DL
ALBUMIN SERPL BCP-MCNC: 2.3 G/DL
ALBUMIN SERPL BCP-MCNC: 2.5 G/DL
ALP SERPL-CCNC: 83 U/L
ALP SERPL-CCNC: 86 U/L
ALP SERPL-CCNC: 86 U/L
ALP SERPL-CCNC: 88 U/L
ALP SERPL-CCNC: 89 U/L
ALP SERPL-CCNC: 89 U/L
ALT SERPL W/O P-5'-P-CCNC: 12 U/L
ALT SERPL W/O P-5'-P-CCNC: 12 U/L
ALT SERPL W/O P-5'-P-CCNC: 14 U/L
ALT SERPL W/O P-5'-P-CCNC: 15 U/L
ANION GAP SERPL CALC-SCNC: 10 MMOL/L
ANION GAP SERPL CALC-SCNC: 13 MMOL/L
ANION GAP SERPL CALC-SCNC: 13 MMOL/L
ANION GAP SERPL CALC-SCNC: 14 MMOL/L
ANION GAP SERPL CALC-SCNC: 14 MMOL/L
ANION GAP SERPL CALC-SCNC: 9 MMOL/L
APTT BLDCRRT: 30 SEC
APTT BLDCRRT: 31 SEC
APTT BLDCRRT: 31.6 SEC
APTT BLDCRRT: 32.6 SEC
AST SERPL-CCNC: 18 U/L
AST SERPL-CCNC: 18 U/L
AST SERPL-CCNC: 20 U/L
AST SERPL-CCNC: 20 U/L
AST SERPL-CCNC: 23 U/L
AST SERPL-CCNC: 23 U/L
BASOPHILS # BLD AUTO: 0.02 K/UL
BASOPHILS # BLD AUTO: 0.03 K/UL
BASOPHILS NFR BLD: 0.3 %
BASOPHILS NFR BLD: 0.5 %
BILIRUB SERPL-MCNC: 1.1 MG/DL
BILIRUB SERPL-MCNC: 1.2 MG/DL
BILIRUB SERPL-MCNC: 1.3 MG/DL
BUN SERPL-MCNC: 80 MG/DL
BUN SERPL-MCNC: 80 MG/DL
BUN SERPL-MCNC: 81 MG/DL
BUN SERPL-MCNC: 81 MG/DL
BUN SERPL-MCNC: 83 MG/DL
BUN SERPL-MCNC: 88 MG/DL
CALCIUM SERPL-MCNC: 8.4 MG/DL
CALCIUM SERPL-MCNC: 8.4 MG/DL
CALCIUM SERPL-MCNC: 8.8 MG/DL
CALCIUM SERPL-MCNC: 8.8 MG/DL
CALCIUM SERPL-MCNC: 8.9 MG/DL
CALCIUM SERPL-MCNC: 9.1 MG/DL
CHLORIDE SERPL-SCNC: 111 MMOL/L
CHLORIDE SERPL-SCNC: 111 MMOL/L
CHLORIDE SERPL-SCNC: 112 MMOL/L
CHLORIDE SERPL-SCNC: 112 MMOL/L
CHLORIDE SERPL-SCNC: 113 MMOL/L
CHLORIDE SERPL-SCNC: 113 MMOL/L
CO2 SERPL-SCNC: 30 MMOL/L
CO2 SERPL-SCNC: 30 MMOL/L
CO2 SERPL-SCNC: 32 MMOL/L
CO2 SERPL-SCNC: 32 MMOL/L
CO2 SERPL-SCNC: 33 MMOL/L
CO2 SERPL-SCNC: 35 MMOL/L
CREAT SERPL-MCNC: 2.6 MG/DL
CREAT SERPL-MCNC: 2.6 MG/DL
CREAT SERPL-MCNC: 2.7 MG/DL
CREAT SERPL-MCNC: 2.8 MG/DL
DIFFERENTIAL METHOD: ABNORMAL
EOSINOPHIL # BLD AUTO: 0.4 K/UL
EOSINOPHIL NFR BLD: 6.2 %
EOSINOPHIL NFR BLD: 6.5 %
EOSINOPHIL NFR BLD: 7.2 %
EOSINOPHIL NFR BLD: 7.2 %
ERYTHROCYTE [DISTWIDTH] IN BLOOD BY AUTOMATED COUNT: 15.6 %
ERYTHROCYTE [DISTWIDTH] IN BLOOD BY AUTOMATED COUNT: 15.7 %
EST. GFR  (AFRICAN AMERICAN): 25.6 ML/MIN/1.73 M^2
EST. GFR  (AFRICAN AMERICAN): 26.8 ML/MIN/1.73 M^2
EST. GFR  (AFRICAN AMERICAN): 28 ML/MIN/1.73 M^2
EST. GFR  (AFRICAN AMERICAN): 28 ML/MIN/1.73 M^2
EST. GFR  (NON AFRICAN AMERICAN): 22.2 ML/MIN/1.73 M^2
EST. GFR  (NON AFRICAN AMERICAN): 23.2 ML/MIN/1.73 M^2
EST. GFR  (NON AFRICAN AMERICAN): 24.3 ML/MIN/1.73 M^2
EST. GFR  (NON AFRICAN AMERICAN): 24.3 ML/MIN/1.73 M^2
GLUCOSE SERPL-MCNC: 117 MG/DL
GLUCOSE SERPL-MCNC: 117 MG/DL
GLUCOSE SERPL-MCNC: 127 MG/DL
GLUCOSE SERPL-MCNC: 129 MG/DL
GLUCOSE SERPL-MCNC: 129 MG/DL
GLUCOSE SERPL-MCNC: 133 MG/DL
HCT VFR BLD AUTO: 23.6 %
HCT VFR BLD AUTO: 23.6 %
HCT VFR BLD AUTO: 24.5 %
HCT VFR BLD AUTO: 25 %
HGB BLD-MCNC: 7.3 G/DL
HGB BLD-MCNC: 7.3 G/DL
HGB BLD-MCNC: 7.6 G/DL
HGB BLD-MCNC: 7.6 G/DL
IMM GRANULOCYTES # BLD AUTO: 0.02 K/UL
IMM GRANULOCYTES # BLD AUTO: 0.03 K/UL
IMM GRANULOCYTES NFR BLD AUTO: 0.3 %
IMM GRANULOCYTES NFR BLD AUTO: 0.5 %
INR PPP: 1.5
LYMPHOCYTES # BLD AUTO: 0.6 K/UL
LYMPHOCYTES # BLD AUTO: 0.6 K/UL
LYMPHOCYTES # BLD AUTO: 0.7 K/UL
LYMPHOCYTES # BLD AUTO: 0.7 K/UL
LYMPHOCYTES NFR BLD: 10.4 %
LYMPHOCYTES NFR BLD: 12.6 %
LYMPHOCYTES NFR BLD: 12.6 %
LYMPHOCYTES NFR BLD: 9.9 %
MAGNESIUM SERPL-MCNC: 2.1 MG/DL
MAGNESIUM SERPL-MCNC: 2.2 MG/DL
MAGNESIUM SERPL-MCNC: 2.2 MG/DL
MAGNESIUM SERPL-MCNC: 2.3 MG/DL
MCH RBC QN AUTO: 30.4 PG
MCH RBC QN AUTO: 30.9 PG
MCHC RBC AUTO-ENTMCNC: 30.4 G/DL
MCHC RBC AUTO-ENTMCNC: 30.9 G/DL
MCHC RBC AUTO-ENTMCNC: 30.9 G/DL
MCHC RBC AUTO-ENTMCNC: 31 G/DL
MCV RBC AUTO: 100 FL
MCV RBC AUTO: 100 FL
MCV RBC AUTO: 98 FL
MCV RBC AUTO: 98 FL
MONOCYTES # BLD AUTO: 0.4 K/UL
MONOCYTES # BLD AUTO: 0.5 K/UL
MONOCYTES NFR BLD: 6.7 %
MONOCYTES NFR BLD: 7.3 %
MONOCYTES NFR BLD: 8.2 %
MONOCYTES NFR BLD: 8.2 %
NEUTROPHILS # BLD AUTO: 4.2 K/UL
NEUTROPHILS # BLD AUTO: 4.2 K/UL
NEUTROPHILS # BLD AUTO: 4.5 K/UL
NEUTROPHILS # BLD AUTO: 4.9 K/UL
NEUTROPHILS NFR BLD: 71.4 %
NEUTROPHILS NFR BLD: 71.4 %
NEUTROPHILS NFR BLD: 75.4 %
NEUTROPHILS NFR BLD: 76 %
NRBC BLD-RTO: 0 /100 WBC
PHOSPHATE SERPL-MCNC: 5 MG/DL
PLATELET # BLD AUTO: 147 K/UL
PLATELET # BLD AUTO: 154 K/UL
PLATELET # BLD AUTO: 154 K/UL
PLATELET # BLD AUTO: 155 K/UL
PMV BLD AUTO: 12.4 FL
PMV BLD AUTO: 12.6 FL
PMV BLD AUTO: 12.6 FL
PMV BLD AUTO: 12.7 FL
POCT GLUCOSE: 140 MG/DL (ref 70–110)
POCT GLUCOSE: 140 MG/DL (ref 70–110)
POTASSIUM SERPL-SCNC: 4.3 MMOL/L
POTASSIUM SERPL-SCNC: 4.4 MMOL/L
POTASSIUM SERPL-SCNC: 4.5 MMOL/L
POTASSIUM SERPL-SCNC: 4.5 MMOL/L
POTASSIUM SERPL-SCNC: 4.6 MMOL/L
POTASSIUM SERPL-SCNC: 4.6 MMOL/L
PROT SERPL-MCNC: 5.8 G/DL
PROT SERPL-MCNC: 5.8 G/DL
PROT SERPL-MCNC: 6.2 G/DL
PROT SERPL-MCNC: 6.5 G/DL
PROTHROMBIN TIME: 15.1 SEC
RBC # BLD AUTO: 2.4 M/UL
RBC # BLD AUTO: 2.4 M/UL
RBC # BLD AUTO: 2.46 M/UL
RBC # BLD AUTO: 2.5 M/UL
SODIUM SERPL-SCNC: 155 MMOL/L
SODIUM SERPL-SCNC: 156 MMOL/L
SODIUM SERPL-SCNC: 157 MMOL/L
WBC # BLD AUTO: 5.86 K/UL
WBC # BLD AUTO: 5.86 K/UL
WBC # BLD AUTO: 5.99 K/UL
WBC # BLD AUTO: 6.45 K/UL

## 2018-06-18 PROCEDURE — 25000003 PHARM REV CODE 250: Performed by: THORACIC SURGERY (CARDIOTHORACIC VASCULAR SURGERY)

## 2018-06-18 PROCEDURE — 80053 COMPREHEN METABOLIC PANEL: CPT

## 2018-06-18 PROCEDURE — 25000003 PHARM REV CODE 250: Performed by: NURSE PRACTITIONER

## 2018-06-18 PROCEDURE — 83735 ASSAY OF MAGNESIUM: CPT

## 2018-06-18 PROCEDURE — 63600175 PHARM REV CODE 636 W HCPCS: Performed by: PHYSICIAN ASSISTANT

## 2018-06-18 PROCEDURE — 63600175 PHARM REV CODE 636 W HCPCS: Performed by: THORACIC SURGERY (CARDIOTHORACIC VASCULAR SURGERY)

## 2018-06-18 PROCEDURE — 97116 GAIT TRAINING THERAPY: CPT

## 2018-06-18 PROCEDURE — 85610 PROTHROMBIN TIME: CPT

## 2018-06-18 PROCEDURE — A4216 STERILE WATER/SALINE, 10 ML: HCPCS | Performed by: THORACIC SURGERY (CARDIOTHORACIC VASCULAR SURGERY)

## 2018-06-18 PROCEDURE — 25000003 PHARM REV CODE 250: Performed by: STUDENT IN AN ORGANIZED HEALTH CARE EDUCATION/TRAINING PROGRAM

## 2018-06-18 PROCEDURE — 92526 ORAL FUNCTION THERAPY: CPT

## 2018-06-18 PROCEDURE — 85730 THROMBOPLASTIN TIME PARTIAL: CPT

## 2018-06-18 PROCEDURE — 25000003 PHARM REV CODE 250: Performed by: PHYSICIAN ASSISTANT

## 2018-06-18 PROCEDURE — 84100 ASSAY OF PHOSPHORUS: CPT

## 2018-06-18 PROCEDURE — 97535 SELF CARE MNGMENT TRAINING: CPT

## 2018-06-18 PROCEDURE — 36415 COLL VENOUS BLD VENIPUNCTURE: CPT

## 2018-06-18 PROCEDURE — 85025 COMPLETE CBC W/AUTO DIFF WBC: CPT | Mod: 91

## 2018-06-18 PROCEDURE — 99222 1ST HOSP IP/OBS MODERATE 55: CPT | Mod: GC,,, | Performed by: ANESTHESIOLOGY

## 2018-06-18 PROCEDURE — 80053 COMPREHEN METABOLIC PANEL: CPT | Mod: 91

## 2018-06-18 PROCEDURE — 20600001 HC STEP DOWN PRIVATE ROOM

## 2018-06-18 RX ORDER — FUROSEMIDE 10 MG/ML
40 SOLUTION ORAL 2 TIMES DAILY
Status: DISCONTINUED | OUTPATIENT
Start: 2018-06-18 | End: 2018-06-20 | Stop reason: HOSPADM

## 2018-06-18 RX ORDER — ACETAMINOPHEN 650 MG/20.3ML
650 LIQUID ORAL EVERY 6 HOURS PRN
Status: DISCONTINUED | OUTPATIENT
Start: 2018-06-18 | End: 2018-06-20 | Stop reason: HOSPADM

## 2018-06-18 RX ORDER — FUROSEMIDE 10 MG/ML
20 INJECTION INTRAMUSCULAR; INTRAVENOUS 2 TIMES DAILY
Status: DISCONTINUED | OUTPATIENT
Start: 2018-06-18 | End: 2018-06-18

## 2018-06-18 RX ORDER — WARFARIN 4 MG/1
4 TABLET ORAL DAILY
Status: DISCONTINUED | OUTPATIENT
Start: 2018-06-18 | End: 2018-06-19

## 2018-06-18 RX ADMIN — HYDRALAZINE HYDROCHLORIDE 25 MG: 25 TABLET, FILM COATED ORAL at 05:06

## 2018-06-18 RX ADMIN — AMPICILLIN 2 G: 2 INJECTION, POWDER, FOR SOLUTION INTRAVENOUS at 11:06

## 2018-06-18 RX ADMIN — RAMELTEON 8 MG: 8 TABLET, FILM COATED ORAL at 09:06

## 2018-06-18 RX ADMIN — POLYETHYLENE GLYCOL 3350 17 G: 17 POWDER, FOR SOLUTION ORAL at 09:06

## 2018-06-18 RX ADMIN — FUROSEMIDE 40 MG: 10 SOLUTION ORAL at 06:06

## 2018-06-18 RX ADMIN — AMPICILLIN 2 G: 2 INJECTION, POWDER, FOR SOLUTION INTRAVENOUS at 07:06

## 2018-06-18 RX ADMIN — AMLODIPINE BESYLATE 10 MG: 10 TABLET ORAL at 08:06

## 2018-06-18 RX ADMIN — Medication 10 ML: at 06:06

## 2018-06-18 RX ADMIN — DOXAZOSIN MESYLATE 4 MG: 2 TABLET ORAL at 08:06

## 2018-06-18 RX ADMIN — MIRTAZAPINE 15 MG: 15 TABLET, FILM COATED ORAL at 09:06

## 2018-06-18 RX ADMIN — HYDRALAZINE HYDROCHLORIDE 25 MG: 25 TABLET, FILM COATED ORAL at 09:06

## 2018-06-18 RX ADMIN — HYDRALAZINE HYDROCHLORIDE 25 MG: 25 TABLET, FILM COATED ORAL at 02:06

## 2018-06-18 RX ADMIN — Medication 3 ML: at 02:06

## 2018-06-18 RX ADMIN — Medication 10 ML: at 12:06

## 2018-06-18 RX ADMIN — Medication 3 ML: at 10:06

## 2018-06-18 RX ADMIN — ATORVASTATIN CALCIUM 40 MG: 20 TABLET, FILM COATED ORAL at 08:06

## 2018-06-18 RX ADMIN — FAMOTIDINE 20 MG: 20 TABLET ORAL at 08:06

## 2018-06-18 RX ADMIN — AMIODARONE HYDROCHLORIDE 200 MG: 200 TABLET ORAL at 08:06

## 2018-06-18 RX ADMIN — WARFARIN SODIUM 4 MG: 4 TABLET ORAL at 06:06

## 2018-06-18 RX ADMIN — ASPIRIN 81 MG CHEWABLE TABLET 324 MG: 81 TABLET CHEWABLE at 08:06

## 2018-06-18 RX ADMIN — Medication 3 ML: at 06:06

## 2018-06-18 RX ADMIN — CEFTRIAXONE SODIUM 2 G: 2 INJECTION, POWDER, FOR SOLUTION INTRAMUSCULAR; INTRAVENOUS at 11:06

## 2018-06-18 RX ADMIN — FUROSEMIDE 10 MG/HR: 10 INJECTION, SOLUTION INTRAMUSCULAR; INTRAVENOUS at 05:06

## 2018-06-18 RX ADMIN — Medication 10 ML: at 11:06

## 2018-06-18 RX ADMIN — AMPICILLIN 2 G: 2 INJECTION, POWDER, FOR SOLUTION INTRAVENOUS at 05:06

## 2018-06-18 NOTE — SUBJECTIVE & OBJECTIVE
Interval History/Significant Events: No acute events overnight, afebrile, vital signs stable. Maintaining sats on room air and 2L NC overnight, on lasix gtt. UOP 2355cc, negative 1.1L. Tolerating TFs     Follow-up For: Procedure(s) (LRB):  Mitral Valve Replacement (N/A)  AORTOCORONARY BYPASS-CABG (N/A)    Post-Operative Day: 24 Days Post-Op    Objective:     Vital Signs (Most Recent):  Temp: 98.9 °F (37.2 °C) (06/17/18 2300)  Pulse: 88 (06/18/18 0445)  Resp: 16 (06/18/18 0445)  BP: (!) 107/55 (06/18/18 0501)  SpO2: 100 % (06/18/18 0445) Vital Signs (24h Range):  Temp:  [98.4 °F (36.9 °C)-98.9 °F (37.2 °C)] 98.9 °F (37.2 °C)  Pulse:  [] 88  Resp:  [13-39] 16  SpO2:  [87 %-100 %] 100 %  BP: ()/(50-59) 107/55     Weight: 71.8 kg (158 lb 4.6 oz)  Body mass index is 22.71 kg/m².      Intake/Output Summary (Last 24 hours) at 06/18/18 0545  Last data filed at 06/18/18 0400   Gross per 24 hour   Intake             1250 ml   Output             2655 ml   Net            -1405 ml       Physical Exam   Constitutional: He appears well-developed and well-nourished.   Cardiovascular: Normal rate, regular rhythm and normal heart sounds.    Incision healing well   Pulmonary/Chest: Effort normal.   5L NC   Abdominal: Soft.   PEG intact   Neurological: He is alert.   Skin: Skin is warm, dry and intact.   Psychiatric: He has a normal mood and affect.       Lines/Drains/Airways     Peripherally Inserted Central Catheter Line                 PICC Double Lumen 03/05/18 1539 right basilic 104 days         PICC Double Lumen 06/04/18 1654 left brachial 13 days          Central Venous Catheter Line                 Percutaneous Central Line Insertion/Assessment - Quad lumen  05/25/18 0800 left subclavian 23 days          Drain                 Gastrostomy/Enterostomy 06/07/18 1657 Gastrostomy-jejunostomy LUQ 10 days         Urethral Catheter 06/11/18 1711 Latex 6 days          Line                 Pacer Wires 05/25/18 1355 23 days                 Significant Labs:    CBC/Anemia Profile:    Recent Labs  Lab 06/17/18  1726 06/17/18  2340 06/18/18  0515   WBC 6.57 6.31 5.86  5.86   HGB 8.0* 7.4* 7.3*  7.3*   HCT 26.3* 23.5* 23.6*  23.6*    158 154  154   MCV 99* 98 98  98   RDW 15.8* 15.6* 15.6*  15.6*        Chemistries:    Recent Labs  Lab 06/17/18  0500 06/17/18  1105 06/17/18  1726 06/17/18  2340   *  153* 154* 154* 155*   K 4.0  4.0 4.2 4.5 4.3     110 109 109 112*   CO2 34*  34* 32* 33* 33*   BUN 69*  69* 76* 77* 83*   CREATININE 2.4*  2.4* 2.6* 2.7* 2.7*   CALCIUM 9.3  9.3 9.5 9.6 9.1   ALBUMIN 2.4*  2.4* 2.6* 2.7* 2.5*   PROT 6.0  6.0 6.7 6.8 6.2   BILITOT 1.7*  1.7* 1.9* 1.5* 1.3*   ALKPHOS 84  84 91 91 83   ALT 12  12 14 15 14   AST 19  19 22 21 20   MG 2.2 2.3 2.4 2.3   PHOS 4.4  --   --   --        Coagulation:   Recent Labs  Lab 06/17/18  0500  06/17/18  2340   INR 2.2*  --   --    APTT 41.1*  < > 31.6   < > = values in this interval not displayed.    Significant Imaging:  I have reviewed all pertinent imaging results/findings within the past 24 hours.

## 2018-06-18 NOTE — PLAN OF CARE
Spoke with [facility] PT stating they are recommending home with home health. Pt, wife and Dr. Go are all in agreement with plan. Will work on new dc plan.

## 2018-06-18 NOTE — ASSESSMENT & PLAN NOTE
" 68yo M with PMH of mitral valve endocarditis and resultant severe MR, TR and pulmonary HTN who is now s/p IABP placement (5/24) and s/pMVR and CABG 5/25.     Plan:     Neuro:   - AAOx4 this AM.   - PRN oxy via J tube for pain control     Pulmonary:   - Currently on 2L NC O2 overnight, wean as tolerated  - Continuous pulse ox.   - ABGs/CXR if distressed.   -monitor CXR, improved from prior this morning  -CT Chest 6/15: "Diffuse ground-glass attenuation densities throughout bilateral lungs with an upper lobe predominance and subpleural sparing that is likely secondary to pulmonary edema, possibly acute on chronic in this patient with a recent history of mitral valve replacement.  Aspiration may be superimposed on pulmonary edema although we consider this less likely in light of symmetric distribution of the pulmonary disease.  Viral infection and pulmonary hemorrhage are also of concern although considered less likely."     Cardiac:  - MAP goal >65  - Paced at 80  - Home amlodipine, ASA, and statin daily.  - Coumadin therapy, INR 2.2 today  - Continuous cardiac monitoring.  - Pathology from valve returned with active subacute endocarditis. S/p ID consultation. PICC placed for long term abx administration.  Per ID Ampicillin and Ceftriaxone x 6 weeks for Enterococcal SBE (end date 7/13/18). In addition, patient will need ESR, CRP, CBC and CMP to be drawn weekly with results faxed to the ID Department at [phone] set up prior to discharge. Appreciate sw assistance.       Renal:   -UOP adequate.   -Bun/Cr stable, 80/2.7, continue to monitor  -Flomax daily given home use.  - MIVF off     Fluids/Electrolytes/Nutrition/GI:   - NPO, continue tube feeds, management per CTS  - Free water flushes    - failed MBSS  - Replace lytes PRN.  - Bowel regimen.     Hematology/Oncology:  - H/H stable, 7.3/23.6 Transfusion per primary team.  - INR daily, 2.2 today  - CBC daily.     Infectious Disease:   -Afebrile  -WBC wnl  -CBC " daily   -Pathology from valve with active endocarditis, ID consulted, recommend Ampicillin and Ceftriaxone x 6 weeks for Enterococcal SBE (end date 7/13/18). In addition, patient will need ESR, CRP, CBC and CMP to be drawn weekly with results faxed to the ID Department at [phone] set up prior to discharge. Appreciate sw assistance.      Endocrine:  - No h/o DM or thyroid dysfunction  - s/p Endocrine evaluation. Continue following recs.   - Glucose goal of 120-180     Dispo:  -Likely ready for stepdown today, will discuss with primary  -[facility] primary

## 2018-06-18 NOTE — SUBJECTIVE & OBJECTIVE
Interval History: No acute events overnight. Doing well this am. CXR improved.    Medications:  Continuous Infusions:   furosemide (LASIX) 2 mg/mL infusion (non-titrating) 10 mg/hr (06/18/18 0700)     Scheduled Meds:   amiodarone  200 mg Per G Tube Daily    amLODIPine  10 mg Per G Tube Daily    ampicillin IVPB  2 g Intravenous Q6H    aspirin  324 mg Per G Tube Daily    atorvastatin  40 mg Per G Tube Daily    cefTRIAXone (ROCEPHIN) IVPB  2 g Intravenous Q12H    doxazosin  4 mg Per G Tube Daily    famotidine  20 mg Per G Tube Daily    hydrALAZINE  25 mg Per G Tube Q8H    mirtazapine  15 mg Per G Tube QHS    polyethylene glycol  17 g Per G Tube Daily    ramelteon  8 mg Per G Tube QHS    sodium chloride 0.9%  10 mL Intravenous Q6H    sodium chloride 0.9%  3 mL Intravenous Q8H    warfarin  1 mg Per G Tube Daily     PRN Meds:albuterol-ipratropium, bisacodyl, dextromethorphan-guaifenesin  mg/5 ml, dextrose 50%, docusate, glucagon (human recombinant), hydrALAZINE, insulin aspart U-100, lactated ringers, magnesium oxide, magnesium oxide, metoclopramide HCl, ondansetron, potassium chloride 10%, potassium chloride 10%, potassium chloride 10%, potassium, sodium phosphates, potassium, sodium phosphates, potassium, sodium phosphates, sodium chloride, Flushing PICC Protocol **AND** sodium chloride 0.9% **AND** sodium chloride 0.9%, white petrolatum-mineral oil     Objective:     Vital Signs (Most Recent):  Temp: 98.6 °F (37 °C) (06/18/18 0700)  Pulse: 90 (06/18/18 0700)  Resp: (!) 26 (06/18/18 0700)  BP: (!) 106/57 (06/18/18 0700)  SpO2: 99 % (06/18/18 0700) Vital Signs (24h Range):  Temp:  [98.4 °F (36.9 °C)-98.9 °F (37.2 °C)] 98.6 °F (37 °C)  Pulse:  [85-97] 90  Resp:  [13-39] 26  SpO2:  [87 %-100 %] 99 %  BP: ()/(50-59) 106/57     Weight: 71.8 kg (158 lb 4.6 oz)  Body mass index is 22.71 kg/m².    SpO2: 99 %  O2 Device (Oxygen Therapy): nasal cannula    Intake/Output - Last 3 Shifts       06/16 0700  - 06/17 0659 06/17 0700 - 06/18 0659 06/18 0700 - 06/19 0659    I.V. (mL/kg) 621 (8.6) 600 (8.4)     NG/GT 1750 1500 50    Total Intake(mL/kg) 2371 (32.7) 2100 (29.2) 50 (0.7)    Urine (mL/kg/hr) 4185 (2.4) 2680 (1.6) 170 (2.3)    Drains 300 (0.2)      Total Output 4485 2680 170    Net -2114 -580 -120           Stool Occurrence 1 x 1 x           Lines/Drains/Airways     Peripherally Inserted Central Catheter Line                 PICC Double Lumen 03/05/18 1539 right basilic 104 days         PICC Double Lumen 06/04/18 1654 left brachial 13 days          Central Venous Catheter Line                 Percutaneous Central Line Insertion/Assessment - Quad lumen  05/25/18 0800 left subclavian 24 days          Drain                 Gastrostomy/Enterostomy 06/07/18 1657 Gastrostomy-jejunostomy LUQ 10 days         Urethral Catheter 06/11/18 1711 Latex 6 days          Line                 Pacer Wires 05/25/18 1355 23 days                Physical Exam   Constitutional: He appears well-developed and well-nourished.   Cardiovascular: Normal rate, regular rhythm and normal heart sounds.    Incision healing well   Pulmonary/Chest: Effort normal.   2L NC   Abdominal: Soft.   PEG intact   Neurological: He is alert.   Skin: Skin is warm, dry and intact.   Psychiatric: He has a normal mood and affect.       Significant Labs:  CBC:   Recent Labs  Lab 06/18/18  0515   WBC 5.86  5.86   RBC 2.40*  2.40*   HGB 7.3*  7.3*   HCT 23.6*  23.6*     154   MCV 98  98   MCH 30.4  30.4   MCHC 30.9*  30.9*     CMP:   Recent Labs  Lab 06/18/18  0515   *  117*   CALCIUM 8.8  8.8   ALBUMIN 2.3*  2.3*   PROT 5.8*  5.8*   *  156*   K 4.6  4.6   CO2 32*  32*   *  111*   BUN 81*  81*   CREATININE 2.7*  2.7*   ALKPHOS 89  89   ALT 12  12   AST 18  18   BILITOT 1.1*  1.1*     Coagulation:   Recent Labs  Lab 06/18/18  0515   INR 1.5*   APTT 32.6*       Significant Diagnostics:  CXR improved pulmonary edema

## 2018-06-18 NOTE — PROGRESS NOTES
[facility]  Critical Care - Surgery  Progress Note    Patient Name: Rodolfo Messina  MRN: [id number]  Admission Date: 5/23/2018  Hospital Length of Stay: 26 days  Code Status: Full Code  Attending Provider: Marvin Go MD  Primary Care Provider: Deric Claudio MD   Principal Problem: S/P MVR (mitral valve replacement)    Subjective:     Interval History/Significant Events: No acute events overnight, afebrile, vital signs stable. Maintaining sats on room air and 2L NC overnight, on lasix gtt. UOP 2355cc, negative 1.1L. Tolerating TFs     Follow-up For: Procedure(s) (LRB):  Mitral Valve Replacement (N/A)  AORTOCORONARY BYPASS-CABG (N/A)    Post-Operative Day: 24 Days Post-Op    Objective:     Vital Signs (Most Recent):  Temp: 98.9 °F (37.2 °C) (06/17/18 2300)  Pulse: 88 (06/18/18 0445)  Resp: 16 (06/18/18 0445)  BP: (!) 107/55 (06/18/18 0501)  SpO2: 100 % (06/18/18 0445) Vital Signs (24h Range):  Temp:  [98.4 °F (36.9 °C)-98.9 °F (37.2 °C)] 98.9 °F (37.2 °C)  Pulse:  [] 88  Resp:  [13-39] 16  SpO2:  [87 %-100 %] 100 %  BP: ()/(50-59) 107/55     Weight: 71.8 kg (158 lb 4.6 oz)  Body mass index is 22.71 kg/m².      Intake/Output Summary (Last 24 hours) at 06/18/18 0545  Last data filed at 06/18/18 0400   Gross per 24 hour   Intake             1250 ml   Output             2655 ml   Net            -1405 ml       Physical Exam   Constitutional: He appears well-developed and well-nourished.   Cardiovascular: Normal rate, regular rhythm and normal heart sounds.    Incision healing well   Pulmonary/Chest: Effort normal.   5L NC   Abdominal: Soft.   PEG intact   Neurological: He is alert.   Skin: Skin is warm, dry and intact.   Psychiatric: He has a normal mood and affect.       Lines/Drains/Airways     Peripherally Inserted Central Catheter Line                 PICC Double Lumen 03/05/18 1539 right basilic 104 days         PICC Double Lumen 06/04/18 1654 left brachial 13 days           Central Venous Catheter Line                 Percutaneous Central Line Insertion/Assessment - Quad lumen  05/25/18 0800 left subclavian 23 days          Drain                 Gastrostomy/Enterostomy 06/07/18 1657 Gastrostomy-jejunostomy LUQ 10 days         Urethral Catheter 06/11/18 1711 Latex 6 days          Line                 Pacer Wires 05/25/18 1355 23 days                Significant Labs:    CBC/Anemia Profile:    Recent Labs  Lab 06/17/18  1726 06/17/18  2340 06/18/18  0515   WBC 6.57 6.31 5.86  5.86   HGB 8.0* 7.4* 7.3*  7.3*   HCT 26.3* 23.5* 23.6*  23.6*    158 154  154   MCV 99* 98 98  98   RDW 15.8* 15.6* 15.6*  15.6*        Chemistries:    Recent Labs  Lab 06/17/18  0500 06/17/18  1105 06/17/18  1726 06/17/18  2340   *  153* 154* 154* 155*   K 4.0  4.0 4.2 4.5 4.3     110 109 109 112*   CO2 34*  34* 32* 33* 33*   BUN 69*  69* 76* 77* 83*   CREATININE 2.4*  2.4* 2.6* 2.7* 2.7*   CALCIUM 9.3  9.3 9.5 9.6 9.1   ALBUMIN 2.4*  2.4* 2.6* 2.7* 2.5*   PROT 6.0  6.0 6.7 6.8 6.2   BILITOT 1.7*  1.7* 1.9* 1.5* 1.3*   ALKPHOS 84  84 91 91 83   ALT 12  12 14 15 14   AST 19  19 22 21 20   MG 2.2 2.3 2.4 2.3   PHOS 4.4  --   --   --        Coagulation:   Recent Labs  Lab 06/17/18  0500  06/17/18  2340   INR 2.2*  --   --    APTT 41.1*  < > 31.6   < > = values in this interval not displayed.    Significant Imaging:  I have reviewed all pertinent imaging results/findings within the past 24 hours.    Assessment/Plan:     Chronic systolic congestive heart failure     66yo M with PMH of mitral valve endocarditis and resultant severe MR, TR and pulmonary HTN who is now s/p IABP placement (5/24) and s/pMVR and CABG 5/25.     Plan:     Neuro:   - AAOx4 this AM.   - PRN oxy via J tube for pain control     Pulmonary:   - Currently on 2L NC O2 overnight, wean as tolerated  - Continuous pulse ox.   - ABGs/CXR if distressed.   -monitor CXR, improved from prior this morning  -CT Chest 6/15:  ""Diffuse ground-glass attenuation densities throughout bilateral lungs with an upper lobe predominance and subpleural sparing that is likely secondary to pulmonary edema, possibly acute on chronic in this patient with a recent history of mitral valve replacement.  Aspiration may be superimposed on pulmonary edema although we consider this less likely in light of symmetric distribution of the pulmonary disease.  Viral infection and pulmonary hemorrhage are also of concern although considered less likely."     Cardiac:  - MAP goal >65  - Paced at 80  - Home amlodipine, ASA, and statin daily.  - Coumadin therapy, INR 2.2 today  - Continuous cardiac monitoring.  - Pathology from valve returned with active subacute endocarditis. S/p ID consultation. PICC placed for long term abx administration.  Per ID Ampicillin and Ceftriaxone x 6 weeks for Enterococcal SBE (end date 7/13/18). In addition, patient will need ESR, CRP, CBC and CMP to be drawn weekly with results faxed to the ID Department at [phone] set up prior to discharge. Appreciate sw assistance.       Renal:   -UOP adequate.   -Bun/Cr stable, 80/2.7, continue to monitor  -Flomax daily given home use.  - MIVF off     Fluids/Electrolytes/Nutrition/GI:   - NPO, continue tube feeds, management per CTS  - Free water flushes    - failed MBSS  - Replace lytes PRN.  - Bowel regimen.     Hematology/Oncology:  - H/H stable, 7.3/23.6 Transfusion per primary team.  - INR daily, 2.2 today  - CBC daily.     Infectious Disease:   -Afebrile  -WBC wnl  -CBC daily   -Pathology from valve with active endocarditis, ID consulted, recommend Ampicillin and Ceftriaxone x 6 weeks for Enterococcal SBE (end date 7/13/18). In addition, patient will need ESR, CRP, CBC and CMP to be drawn weekly with results faxed to the ID Department at [phone] set up prior to discharge. Appreciate sw assistance.      Endocrine:  - No h/o DM or thyroid dysfunction  - s/p Endocrine evaluation. " Continue following recs.   - Glucose goal of 120-180     Dispo:  -Likely ready for stepdown today, will discuss with primary  -CTS primary                 Critical care was time spent personally by me on the following activities: development of treatment plan with patient or surrogate and bedside caregivers, discussions with consultants, evaluation of patient's response to treatment, examination of patient, ordering and performing treatments and interventions, ordering and review of laboratory studies, ordering and review of radiographic studies, pulse oximetry, re-evaluation of patient's condition.  This critical care time did not overlap with that of any other provider or involve time for any procedures.     Pepper Chadwick MD  Critical Care - Surgery  [facility]

## 2018-06-18 NOTE — PLAN OF CARE
CM spoke with pt's wife Lupe concerning dc plan. Plan to dc home with home health, tube feeds, IVABX, PT/OT/ST, coumadin. CM offered HH list, declined. Wishes for [facility], [facility]. Wife states has had [facility] and [facility] and does not want to use them again. She states Dr. Go informed her this am that pt would be ready for discharge in the next 2 days. Plan to transfer to floor today. Will update SW.

## 2018-06-18 NOTE — PT/OT/SLP PROGRESS
"Speech Language Pathology Treatment    Patient Name:  Rodolfo Messina   MRN:  [id number]   [id number]    Admitting Diagnosis: S/P MVR (mitral valve replacement)    Recommendations:                 General Recommendations:  Dysphagia therapy  Diet recommendations:  NPO, Pleasure Feeding, Liquid Diet Level: NPO Continue PEG tube feedings for nutrition/hydration/medication   · Puree & honey thick liquids ok for pleasure with 2-3 swallows per bolus, strict aspiration precautions listed below & discontinue PO if ANY s/s aspiration (coughing, throat clearing, wet vocal quality)   ? A maximum of 2-3 teaspoons an hour s/p oral care  ? 1:1 Assistance with PO to ensure monitoring of s/s aspiration,   ? Assistance with thickening liquids,   ? 3-4 swallows with each bite/sip,   ? Feed only when awake/alert,   ? HOB to 90 degrees,   ? Monitor for s/s of aspiration,   ? No straws,  ? Small bites/sips  Aspiration Precautions: Frequent oral care and Strict aspiration precautions   General Precautions: Standard, fall, aspiration  Communication strategies:  none    Subjective     CXR 6/18/18: FINDINGS: Decreased prominence of interstitial markings and clearing of airspace consolidation in both lungs since 06/17/2018 is appreciated, the clearing particularly evident in the right lower lung zone.  There has been no detrimental interval change in the appearance of the chest since that time.  No pneumothorax.    Patient awake, cooperative, and sitting in chair. Patient's wife present in room.   Patient states, "Can I try the pleasure feed stuff again?"    Pain/Comfort:  ·   no pain reported    Objective:     Has the patient been evaluated by SLP for swallowing?   Yes  Keep patient NPO? Yes   Current Respiratory Status:  (patient on room air throughout ST session)      Patient seen for dysphagia therapy. Intermittent throat clears noted throughout session and during conversation. Patient reports continued inconsistent coughing up blood " "from previous nose bleed. Patient demonstrated all swallowing exercises with no cues. Handouts listing swallowing exercises present at bedside. Patient and wife asking about utilization of mints/gum to facilitate completion of swallowing exercises. Patient and wife verbalized understanding of risks when utilizing suckers, mints, and gum to facilitate saliva production for exercises. Patient assessed with tsp sips of honey thick orange juice x5 with a throat clear noted follow last 2 trials. SLP cued patient to complete volitional coughs.   Extensive education provided on:   -risk of aspiration  -s/s of aspiration  -previous MBSS results (patient and wife showed MBSS recording to assist in increased understanding of aspiration risk and residue with honey thick/puree trials)  -swallowing anatomy  -importance of oral care  -Pleasure feedings with a maximum of 2-3 bites an hour s/p oral care with a minimum of 4 swallows per trial and STRICT aspiration precautions  -importance of continuing to monitor for signs and symptoms of aspiration and discontinue oral feeding should you notice any of the following: watery eyes, reddened facial area, wet vocal quality, increased work of breathing, change in respiratory status, increased congestion, coughing, fever, etc.  -ongoing POC  -SLP recommendations   -importance of completing all swallowing exercises multiple times a day  All questions and concerns addressed. Patient and wife verbalized understanding and are in agreement with POC. Patient stated, "I'd rather stay on the conservative side when it comes to risks."        Assessment:     Rodolfo Messina is a 68 y.o. male with an SLP diagnosis of Dysphagia.  He presents with severe oropharyngeal dyspahgia. ST will continue to follow.     Goals:    SLP Goals        Problem: SLP Goal    Goal Priority Disciplines Outcome   SLP Goal     SLP Ongoing (interventions implemented as appropriate)   Description:  Speech Language " Pathology Goals  Goals expected to be met by 6/22/2018  1. Pt will to tolerate 1/2 tsp bites puree while completing 2-3 swallows per bolus w/o overt S/S aspiration, MIN A  2. Pt will tolerate trials of tsp sips honey thickened liquids while completing 2-3 swallows per bolus w/o overt S/S aspiration, MIN A   3. Educate Pt and family on safe swallow strategies and S/S aspiration   4. Continue to assess swallow to determine feasibility of re-initiation of pleasure feeds   5. Pt will complete dysphagia exercises given min A    Goals expected to be met by 6/11 -unable to meet prior to transfer to ICU  1. Pt will tolerate puree & honey thickened liquids for pleasure while completing 2-3 swallows per bolus w/o overt S/S aspiration, MIN A   3. Educate Pt and family on safe swallow strategies and S/S aspiration   4. Continue to assess swallow to determine feasibility of PO upgrade   5. Pt will complete dysphagia exercises given min A      Speech Language Pathology Goals  Goals expected to be met by 6/7:  1. Pt will tolerate trials of puree w/o overt S/S aspiration, MIN A - CONTINUE  2. Pt will tolerate trials of nectar-thickened liquids w/o overt S/S aspiration, MIN A - DISCONTINUE  3. Educate Pt and family on safe swallow strategies and S/S aspiration - CONTINUE  4. Continue to assess swallow to determine feasibility of PO upgrade - CONTINUE      Goals expected to be met by 6/4: goals not met 2/2 transfer to ICU 5/30  1. Pt will participate in ongoing swallowing assessment to determine safest and least restrictive diet.                            Plan:     · Patient to be seen:  5 x/week   · Plan of Care expires:  06/30/18  · Plan of Care reviewed with:  patient, spouse   · SLP Follow-Up:  Yes       Discharge recommendations:  rehabilitation facility       Time Tracking:     SLP Treatment Date:   06/18/18  Speech Start Time:  1055  Speech Stop Time:  1142     Speech Total Time (min):  47 min    Billable Minutes: Treatment  Swallowing Dysfunction 23 and Seld Care/Home Management Training 24    IRVIN Obando, CCC-SLP   Pager: [phone]  06/18/2018

## 2018-06-18 NOTE — NURSING TRANSFER
Nursing Transfer Note      6/18/2018     Transfer From: [id number]    Transfer via wheelchair    Transfer with cardiac monitoring    Transported by Pati JEAN    Medicines sent: Tube feeding    Chart send with patient: Yes

## 2018-06-18 NOTE — PROGRESS NOTES
" [facility]  Adult Nutrition  Progress Note    SUMMARY       Recommendations  Recommendation/Intervention:   1. Continue current TF regimen - meeting EEN and EPN.   2. As medically able, ADAT to Cardiac with texture per SLP.  RD to monitor.    Goals: Pt to receive nutrition by RD follow up  Nutrition Goal Status: goal met  Communication of RD Recs:  (POC)    Reason for Assessment  Reason for Assessment: RD follow-up  Diagnosis: cardiac disease (s/p MVR & CABG 5/25)  Relevant Medical History: mitral valve endocarditis, severe MR, pulmonary HTN, CHF, diverticulosis  General Information Comments: Patient transferred to SICU 6/13. Patient still NPO, on TF and tolerating at goal rate.  Nutrition Discharge Planning: Unable to determine at this time.    Nutrition Risk Screen  Nutrition Risk Screen: no indicators present    Nutrition/Diet History  Do you have any cultural, spiritual, Advent conflicts, given your current situation?: none  Factors Affecting Nutritional Intake: NPO, difficulty/impaired swallowing    Anthropometrics  Temp: 98.6 °F (37 °C)  Height Method: Stated  Height: 5' 10" (177.8 cm)  Height (inches): 70 in  Weight Method: Bed Scale  Weight: 71.8 kg (158 lb 4.6 oz)  Weight (lb): 158.29 lb  Ideal Body Weight (IBW), Male: 166 lb  % Ideal Body Weight, Male (lb): 101.46 lb  BMI (Calculated): 24.2  BMI Grade: 18.5-24.9 - normal    Lab/Procedures/Meds  Pertinent Labs Reviewed: reviewed  Pertinent Labs Comments: Na 156, Cl 111, BUN 81, Creat 2.7, Glu 117, POCT Glu 131-173, Phos 5.0, Alb 2.3, T. bili 1.1  Pertinent Medications Reviewed: reviewed  Pertinent Medications Comments: famotidine, coumadin, lasix    Physical Findings/Assessment  Overall Physical Appearance: on oxygen therapy  Tubes: gastro-jejunostomy tube  Oral/Mouth Cavity: tooth/teeth missing  Skin: edema, incision(s)    Estimated/Assessed Needs  Weight Used For Calorie Calculations: 65.4 kg (144 lb 2.9 oz)  Energy Calorie " Requirements (kcal): 1794 kcal/day  Energy Need Method: [place] (x1.25 PAL)  Protein Requirements: 79-92 g/d (1.2-1.4 g/kg)  Weight Used For Protein Calculations: 65.4 kg (144 lb 2.9 oz)  Fluid Requirements (mL): 1 mL/kcal or per MD  Fluid Need Method: RDA Method  RDA Method (mL): 1794    Nutrition Prescription Ordered  Current Diet Order: NPO  Current Nutrition Support Formula Ordered: Isosource 1.5  Current Nutrition Support Rate Ordered: 50 (ml)  Current Nutrition Support Frequency Ordered: mL/hr  Oral Nutrition Supplement: Beneprotein 2x daily    Evaluation of Received Nutrient/Fluid Intake  Enteral Calories (kcal): 1800  Enteral Protein (gm): 82  Enteral (Free Water) Fluid (mL): 917  Other Calories (kcal): 50  Total Calories (kcal): 1850  % Kcal Needs: 103  Other Protein (gm): 12  Total Protein (gm): 94  % Protein Needs: 102  I/O: -6.3L since admit, good UOP  Energy Calories Required: meeting needs  Protein Required: meeting needs  Fluid Required:  (per MD)  Comments: LBM 6/18  Tolerance: tolerating  % Intake of Estimated Energy Needs: 75 - 100 %  % Meal Intake: NPO    Nutrition Risk  Level of Risk/Frequency of Follow-up: moderate (1x/week)     Assessment and Plan  * S/P MVR (mitral valve replacement)    Contributing Nutrition Diagnosis  Inadequate energy intake    Related to (etiology):   Decreased ability to consume sufficient energy    Signs and Symptoms (as evidenced by):   NPO and no alternative means of nutrition at this time     Nutrition Diagnosis Status:   Resolved          Monitor and Evaluation  Food and Nutrient Intake: energy intake, food and beverage intake, parenteral nutrition intake  Food and Nutrient Adminstration: diet order, enteral and parenteral nutrition administration  Anthropometric Measurements: weight, weight change, body mass index  Biochemical Data, Medical Tests and Procedures: electrolyte and renal panel, gastrointestinal profile, inflammatory profile  Nutrition-Focused  Physical Findings: overall appearance     Nutrition Follow-Up  RD Follow-up?: Yes

## 2018-06-18 NOTE — ASSESSMENT & PLAN NOTE
--Continue ASA, statin (holding BB)  --ENT consult for epistaxis; concern for aspiration of old blood clots from sinuses; decreased and improved  --CT chest consistent with volume overload  -- Satting well on NC, wean as tolerated for O2 sat >92%  -- Lasix gtt @ 10mg/hr; will d/c and start pushes  --Monitor H/H  --Sternal precautions  --PT/OT  --Ambulate x4  --monitor CXR    --Monitor electrolytes and replace prn  --Coumadin hold today again today  -- Q6; increase for hypernatremia  --continue  lackey   --strict I&Os  --did not pass modified swallow; PICC  --G-J tube placed; TF at goal        DISPO: Continue ICU care, discharge planning on going for rehab

## 2018-06-18 NOTE — PLAN OF CARE
SW sent referrals to [facility] and [facility] .  SW will f/u as needed.    Radha Montes, [facility] x [phone]

## 2018-06-18 NOTE — PT/OT/SLP PROGRESS
Physical Therapy Treatment    Patient Name:  Rodolfo Messina   MRN:  [id number]    Recommendations:     Discharge Recommendations:  Home with home health   Discharge Equipment Recommendations: none   Barriers to discharge: None    Assessment:     Rodolfo Messina is a 68 y.o. male admitted with a medical diagnosis of S/P MVR (mitral valve replacement).  He presents with the following impairments/functional limitations:  weakness, impaired endurance, impaired functional mobilty, gait instability, impaired balance, impaired cardiopulmonary response to activity. Pt chad treatment better with further distance in gait training reached. Will cont with skilled PT services 5x/wk to increase endurance, increase gait training distance, and reach highest level of function. Recommending HH once medically stable. S/p MVr 05/29/18 and GABG 05/27/18.     Rehab Prognosis:  good; patient would benefit from acute skilled PT services to address these deficits and reach maximum level of function.      Recent Surgery: Procedure(s) (LRB):  Mitral Valve Replacement (N/A)  AORTOCORONARY BYPASS-CABG (N/A) 24 Days Post-Op    Plan:     During this hospitalization, patient to be seen 5 x/week to address the above listed problems via gait training, therapeutic activities, therapeutic exercises  · Plan of Care Expires:  07/13/18   Plan of Care Reviewed with: patient, spouse    Subjective     Communicated with nurse prior to session.  Patient found sitting in chair upon PT entry to room, agreeable to treatment.      Chief Complaint: none  Patient comments/goals: to get better and go home  Pain/Comfort:  · Pain Rating 1: 0/10    Patients cultural, spiritual, Denominational conflicts given the current situation: none    Objective:     Patient found with: blood pressure cuff, pulse ox (continuous), telemetry, PEG Tube, lackey catheter     General Precautions: Standard, fall, sternal   Orthopedic Precautions:N/A   Braces:       Functional  Mobility:  · Transfers:     · Sit to Stand x2 reps:  contact guard assistance with no AD  ·   · Gait: total distance of 356 ft with CGA with IV pole intact plus one for managing chair. RN present with monitor. (336 ft, 20 ft with sitting rest break in between.) Presented with good upright posture and forward-looking gaze. No c/o of SOB or dizziness.      AM-PAC 6 CLICK MOBILITY  Turning over in bed (including adjusting bedclothes, sheets and blankets)?: 4  Sitting down on and standing up from a chair with arms (e.g., wheelchair, bedside commode, etc.): 4  Moving from lying on back to sitting on the side of the bed?: 3  Moving to and from a bed to a chair (including a wheelchair)?: 3  Need to walk in hospital room?: 3  Climbing 3-5 steps with a railing?: 1  Total Score: 18     Patient left up in chair with all lines intact, call button in reach and wife present..    GOALS:    Physical Therapy Goals        Problem: Physical Therapy Goal    Goal Priority Disciplines Outcome Goal Variances Interventions   Physical Therapy Goal     PT/OT, PT Ongoing (interventions implemented as appropriate)     Description:  Goals to be met by: 2018    Patient will increase functional independence with mobility by performin. Supine to sit with Supervision - not met  2. Sit to stand transfer with Supervision - not met  3. Bed to chair transfer with Supervision  - not met  4. Gait  x 250 feet with Supervision - not met  5. Lower extremity exercise program x20 reps per handout, with independence -  Not met                        Time Tracking:     PT Received On: 18  PT Start Time: 929     PT Stop Time: 949  PT Total Time (min): 20 min     Billable Minutes: Gait Training 20 minutes    Treatment Type: Treatment  PT/PTA: PT     PTA Visit Number: 0     MICHAEL Taylor  2018

## 2018-06-18 NOTE — PLAN OF CARE
Problem: SLP Goal  Goal: SLP Goal  Speech Language Pathology Goals  Goals expected to be met by 6/22/2018  1. Pt will to tolerate 1/2 tsp bites puree while completing 2-3 swallows per bolus w/o overt S/S aspiration, MIN A  2. Pt will tolerate trials of tsp sips honey thickened liquids while completing 2-3 swallows per bolus w/o overt S/S aspiration, MIN A   3. Educate Pt and family on safe swallow strategies and S/S aspiration   4. Continue to assess swallow to determine feasibility of re-initiation of pleasure feeds   5. Pt will complete dysphagia exercises given min A    Goals expected to be met by 6/11 -unable to meet prior to transfer to ICU  1. Pt will tolerate puree & honey thickened liquids for pleasure while completing 2-3 swallows per bolus w/o overt S/S aspiration, MIN A   3. Educate Pt and family on safe swallow strategies and S/S aspiration   4. Continue to assess swallow to determine feasibility of PO upgrade   5. Pt will complete dysphagia exercises given min A      Speech Language Pathology Goals  Goals expected to be met by 6/7:  1. Pt will tolerate trials of puree w/o overt S/S aspiration, MIN A - CONTINUE  2. Pt will tolerate trials of nectar-thickened liquids w/o overt S/S aspiration, MIN A - DISCONTINUE  3. Educate Pt and family on safe swallow strategies and S/S aspiration - CONTINUE  4. Continue to assess swallow to determine feasibility of PO upgrade - CONTINUE      Goals expected to be met by 6/4: goals not met 2/2 transfer to ICU 5/30  1. Pt will participate in ongoing swallowing assessment to determine safest and least restrictive diet.           Outcome: Ongoing (interventions implemented as appropriate)  Goals remain appropriate. Patient with increased tolerance of honey thick liquid trials this date. Recommend: Re-initiate pleasure feedings of a MAXIMUM of 2-3 tsp bites of puree or honey thick liquids an hour with a minimum of 4 swallows per bite with strict aspiration precautions and  close monitoring. Discontinue should patient/family/staff note coughing, throat clearing, watery eyes, reddened facial area, wet vocal quality, increased work of breathing, change in respiratory status, increased congestion, coughing, or fever. ST will continue to follow.   LANCE Recinos, CCC-SLP  Pager: [phone]  06/18/2018

## 2018-06-18 NOTE — PLAN OF CARE
Problem: Physical Therapy Goal  Goal: Physical Therapy Goal  Goals to be met by: 2018    Patient will increase functional independence with mobility by performin. Supine to sit with Supervision - not met  2. Sit to stand transfer with Supervision - not met  3. Bed to chair transfer with Supervision  - not met  4. Gait  x 250 feet with Supervision - not met  5. Lower extremity exercise program x20 reps per handout, with independence -  Not met       Goals still appropriate at this time. Lorena Padgett, SPT 2018

## 2018-06-18 NOTE — PROGRESS NOTES
[facility]  Cardiothoracic Surgery  Progress Note    Patient Name: Rodolfo Messina  MRN: [id number]  Admission Date: 5/23/2018  Hospital Length of Stay: 26 days  Code Status: Full Code   Attending Physician: Marvin Go MD   Referring Provider: Marvin Go MD  Principal Problem:S/P MVR (mitral valve replacement)    Subjective:     Post-Op Info:  Procedure(s) (LRB):  Mitral Valve Replacement (N/A)  AORTOCORONARY BYPASS-CABG (N/A)   24 Days Post-Op     Interval History: No acute events overnight. Doing well this am. CXR improved.    Medications:  Continuous Infusions:   furosemide (LASIX) 2 mg/mL infusion (non-titrating) 10 mg/hr (06/18/18 0700)     Scheduled Meds:   amiodarone  200 mg Per G Tube Daily    amLODIPine  10 mg Per G Tube Daily    ampicillin IVPB  2 g Intravenous Q6H    aspirin  324 mg Per G Tube Daily    atorvastatin  40 mg Per G Tube Daily    cefTRIAXone (ROCEPHIN) IVPB  2 g Intravenous Q12H    doxazosin  4 mg Per G Tube Daily    famotidine  20 mg Per G Tube Daily    hydrALAZINE  25 mg Per G Tube Q8H    mirtazapine  15 mg Per G Tube QHS    polyethylene glycol  17 g Per G Tube Daily    ramelteon  8 mg Per G Tube QHS    sodium chloride 0.9%  10 mL Intravenous Q6H    sodium chloride 0.9%  3 mL Intravenous Q8H    warfarin  1 mg Per G Tube Daily     PRN Meds:albuterol-ipratropium, bisacodyl, dextromethorphan-guaifenesin  mg/5 ml, dextrose 50%, docusate, glucagon (human recombinant), hydrALAZINE, insulin aspart U-100, lactated ringers, magnesium oxide, magnesium oxide, metoclopramide HCl, ondansetron, potassium chloride 10%, potassium chloride 10%, potassium chloride 10%, potassium, sodium phosphates, potassium, sodium phosphates, potassium, sodium phosphates, sodium chloride, Flushing PICC Protocol **AND** sodium chloride 0.9% **AND** sodium chloride 0.9%, white petrolatum-mineral oil     Objective:     Vital Signs (Most Recent):  Temp: 98.6 °F (37 °C) (06/18/18  0700)  Pulse: 90 (06/18/18 0700)  Resp: (!) 26 (06/18/18 0700)  BP: (!) 106/57 (06/18/18 0700)  SpO2: 99 % (06/18/18 0700) Vital Signs (24h Range):  Temp:  [98.4 °F (36.9 °C)-98.9 °F (37.2 °C)] 98.6 °F (37 °C)  Pulse:  [85-97] 90  Resp:  [13-39] 26  SpO2:  [87 %-100 %] 99 %  BP: ()/(50-59) 106/57     Weight: 71.8 kg (158 lb 4.6 oz)  Body mass index is 22.71 kg/m².    SpO2: 99 %  O2 Device (Oxygen Therapy): nasal cannula    Intake/Output - Last 3 Shifts       06/16 0700 - 06/17 0659 06/17 0700 - 06/18 0659 06/18 0700 - 06/19 0659    I.V. (mL/kg) 621 (8.6) 600 (8.4)     NG/GT 1750 1500 50    Total Intake(mL/kg) 2371 (32.7) 2100 (29.2) 50 (0.7)    Urine (mL/kg/hr) 4185 (2.4) 2680 (1.6) 170 (2.3)    Drains 300 (0.2)      Total Output 4485 2680 170    Net -2114 -580 -120           Stool Occurrence 1 x 1 x           Lines/Drains/Airways     Peripherally Inserted Central Catheter Line                 PICC Double Lumen 03/05/18 1539 right basilic 104 days         PICC Double Lumen 06/04/18 1654 left brachial 13 days          Central Venous Catheter Line                 Percutaneous Central Line Insertion/Assessment - Quad lumen  05/25/18 0800 left subclavian 24 days          Drain                 Gastrostomy/Enterostomy 06/07/18 1657 Gastrostomy-jejunostomy LUQ 10 days         Urethral Catheter 06/11/18 1711 Latex 6 days          Line                 Pacer Wires 05/25/18 1355 23 days                Physical Exam   Constitutional: He appears well-developed and well-nourished.   Cardiovascular: Normal rate, regular rhythm and normal heart sounds.    Incision healing well   Pulmonary/Chest: Effort normal.   2L NC   Abdominal: Soft.   PEG intact   Neurological: He is alert.   Skin: Skin is warm, dry and intact.   Psychiatric: He has a normal mood and affect.       Significant Labs:  CBC:   Recent Labs  Lab 06/18/18  0515   WBC 5.86  5.86   RBC 2.40*  2.40*   HGB 7.3*  7.3*   HCT 23.6*  23.6*     154   MCV 98   98   MCH 30.4  30.4   MCHC 30.9*  30.9*     CMP:   Recent Labs  Lab 06/18/18  0515   *  117*   CALCIUM 8.8  8.8   ALBUMIN 2.3*  2.3*   PROT 5.8*  5.8*   *  156*   K 4.6  4.6   CO2 32*  32*   *  111*   BUN 81*  81*   CREATININE 2.7*  2.7*   ALKPHOS 89  89   ALT 12  12   AST 18  18   BILITOT 1.1*  1.1*     Coagulation:   Recent Labs  Lab 06/18/18  0515   INR 1.5*   APTT 32.6*       Significant Diagnostics:  CXR improved pulmonary edema    Assessment/Plan:     * S/P MVR (mitral valve replacement)    --Continue ASA, statin (holding BB)  --ENT consult for epistaxis; concern for aspiration of old blood clots from sinuses; decreased and improved  --CT chest consistent with volume overload  -- Satting well on NC, wean as tolerated for O2 sat >92%  -- Lasix gtt @ 10mg/hr; will d/c and start pushes  --Monitor H/H  --Sternal precautions  --PT/OT  --Ambulate x4  --monitor CXR    --Monitor electrolytes and replace prn  --Coumadin hold today again today  -- Q6; increase for hypernatremia  --continue  lackey   --strict I&Os  --did not pass modified swallow; PICC  --G-J tube placed; TF at goal        DISPO: Continue ICU care, discharge planning on going for rehab           Dysphagia    --SLP following  --failed last BMS study   --GJ tube placed by IR 6/7        Impaired functional mobility and endurance    --discharging to rehab  --PT/OT daily         On enteral nutrition    --TFs at goal; held for emesis of blood clots        Epistaxis    --Noted to have post traumatic left sided epistaxis following attempted NGT placement. On the time of evaluation patient was hemostatic. No active bleeding noted.   -Vomited blood clots; ENT to re-evaluate  -Given that patient is hemostatic, no acute intervention   -Recommend minimize nasal trauma as best possible  -If requires oxygen, ideal if it is humidified via face tent and not nasal canula  -Bacitracin to bilateral nares nightly  -Afrin BID x 3  days  -Ocean nasal spray q6hrs while awake  -If bleeding restarts, hold pressure to fleshy portion of nose (10 mins) and lean forward  -For bleeding that does not stop, page resident  -Remainder of medical management per primary team        Atrial flutter    --EP following appreciate recs., will reach out to them- pt currently in Afib that began 6/6- Amio IV load and infusion, now on PO amio  --holding BB        S/P CABG x 1    --see MVR         Chronic systolic congestive heart failure    - ASA, statin  - D/c beta blocker  - EP following; appreciate recs          ANNA (acute kidney injury)    --Elevated Cr  --D/c lasix gtt and start pushes            Vishnu Hawkins MD  Cardiothoracic Surgery  [facility]

## 2018-06-19 ENCOUNTER — PES CALL (OUTPATIENT)
Dept: ADMINISTRATIVE | Facility: CLINIC | Age: 68
End: 2018-06-19

## 2018-06-19 PROBLEM — R73.9 ACUTE HYPERGLYCEMIA: Status: RESOLVED | Noted: 2018-05-25 | Resolved: 2018-06-19

## 2018-06-19 PROBLEM — Z91.89 AT HIGH RISK FOR ALTERED SKIN INTEGRITY: Status: ACTIVE | Noted: 2018-06-19

## 2018-06-19 PROBLEM — J96.01 ACUTE RESPIRATORY FAILURE WITH HYPOXIA: Status: RESOLVED | Noted: 2018-02-24 | Resolved: 2018-06-19

## 2018-06-19 LAB
ALBUMIN SERPL BCP-MCNC: 2.4 G/DL
ALBUMIN SERPL BCP-MCNC: 2.4 G/DL
ALBUMIN SERPL BCP-MCNC: 2.5 G/DL
ALBUMIN SERPL BCP-MCNC: 2.6 G/DL
ALP SERPL-CCNC: 78 U/L
ALP SERPL-CCNC: 78 U/L
ALP SERPL-CCNC: 86 U/L
ALP SERPL-CCNC: 87 U/L
ALT SERPL W/O P-5'-P-CCNC: 15 U/L
ALT SERPL W/O P-5'-P-CCNC: 17 U/L
ANION GAP SERPL CALC-SCNC: 10 MMOL/L
ANION GAP SERPL CALC-SCNC: 10 MMOL/L
ANION GAP SERPL CALC-SCNC: 12 MMOL/L
ANION GAP SERPL CALC-SCNC: 9 MMOL/L
APTT BLDCRRT: 29 SEC
APTT BLDCRRT: 29.6 SEC
APTT BLDCRRT: 32.1 SEC
AST SERPL-CCNC: 20 U/L
AST SERPL-CCNC: 21 U/L
AST SERPL-CCNC: 21 U/L
AST SERPL-CCNC: 24 U/L
BASOPHILS # BLD AUTO: 0.02 K/UL
BASOPHILS # BLD AUTO: 0.02 K/UL
BASOPHILS # BLD AUTO: 0.03 K/UL
BASOPHILS NFR BLD: 0.2 %
BASOPHILS NFR BLD: 0.3 %
BASOPHILS NFR BLD: 0.5 %
BILIRUB SERPL-MCNC: 0.9 MG/DL
BILIRUB SERPL-MCNC: 0.9 MG/DL
BILIRUB SERPL-MCNC: 1 MG/DL
BILIRUB SERPL-MCNC: 1.3 MG/DL
BUN SERPL-MCNC: 81 MG/DL
BUN SERPL-MCNC: 87 MG/DL
CALCIUM SERPL-MCNC: 8.8 MG/DL
CALCIUM SERPL-MCNC: 8.9 MG/DL
CALCIUM SERPL-MCNC: 8.9 MG/DL
CALCIUM SERPL-MCNC: 9.3 MG/DL
CHLORIDE SERPL-SCNC: 112 MMOL/L
CHLORIDE SERPL-SCNC: 114 MMOL/L
CO2 SERPL-SCNC: 31 MMOL/L
CREAT SERPL-MCNC: 2.5 MG/DL
CREAT SERPL-MCNC: 2.7 MG/DL
CREAT SERPL-MCNC: 2.7 MG/DL
CREAT SERPL-MCNC: 2.8 MG/DL
DIFFERENTIAL METHOD: ABNORMAL
EOSINOPHIL # BLD AUTO: 0.5 K/UL
EOSINOPHIL # BLD AUTO: 0.6 K/UL
EOSINOPHIL NFR BLD: 6.7 %
EOSINOPHIL NFR BLD: 7.4 %
EOSINOPHIL NFR BLD: 7.6 %
ERYTHROCYTE [DISTWIDTH] IN BLOOD BY AUTOMATED COUNT: 15.4 %
ERYTHROCYTE [DISTWIDTH] IN BLOOD BY AUTOMATED COUNT: 15.4 %
ERYTHROCYTE [DISTWIDTH] IN BLOOD BY AUTOMATED COUNT: 15.5 %
EST. GFR  (AFRICAN AMERICAN): 25.6 ML/MIN/1.73 M^2
EST. GFR  (AFRICAN AMERICAN): 26.8 ML/MIN/1.73 M^2
EST. GFR  (AFRICAN AMERICAN): 26.8 ML/MIN/1.73 M^2
EST. GFR  (AFRICAN AMERICAN): 29.4 ML/MIN/1.73 M^2
EST. GFR  (NON AFRICAN AMERICAN): 22.2 ML/MIN/1.73 M^2
EST. GFR  (NON AFRICAN AMERICAN): 23.2 ML/MIN/1.73 M^2
EST. GFR  (NON AFRICAN AMERICAN): 23.2 ML/MIN/1.73 M^2
EST. GFR  (NON AFRICAN AMERICAN): 25.4 ML/MIN/1.73 M^2
GLUCOSE SERPL-MCNC: 120 MG/DL
GLUCOSE SERPL-MCNC: 120 MG/DL
GLUCOSE SERPL-MCNC: 130 MG/DL
GLUCOSE SERPL-MCNC: 141 MG/DL
HCT VFR BLD AUTO: 23.4 %
HCT VFR BLD AUTO: 24.8 %
HCT VFR BLD AUTO: 25.3 %
HGB BLD-MCNC: 7.2 G/DL
HGB BLD-MCNC: 7.3 G/DL
HGB BLD-MCNC: 7.5 G/DL
IMM GRANULOCYTES # BLD AUTO: 0.02 K/UL
IMM GRANULOCYTES # BLD AUTO: 0.03 K/UL
IMM GRANULOCYTES NFR BLD AUTO: 0.3 %
IMM GRANULOCYTES NFR BLD AUTO: 0.4 %
INR PPP: 1.5
LYMPHOCYTES # BLD AUTO: 0.7 K/UL
LYMPHOCYTES # BLD AUTO: 0.8 K/UL
LYMPHOCYTES NFR BLD: 10.5 %
LYMPHOCYTES NFR BLD: 11.3 %
LYMPHOCYTES NFR BLD: 8.5 %
MAGNESIUM SERPL-MCNC: 2.2 MG/DL
MAGNESIUM SERPL-MCNC: 2.4 MG/DL
MAGNESIUM SERPL-MCNC: 2.5 MG/DL
MCH RBC QN AUTO: 29.6 PG
MCH RBC QN AUTO: 29.6 PG
MCH RBC QN AUTO: 30.8 PG
MCHC RBC AUTO-ENTMCNC: 29.4 G/DL
MCHC RBC AUTO-ENTMCNC: 29.6 G/DL
MCHC RBC AUTO-ENTMCNC: 30.8 G/DL
MCV RBC AUTO: 100 FL
MONOCYTES # BLD AUTO: 0.5 K/UL
MONOCYTES NFR BLD: 6.3 %
MONOCYTES NFR BLD: 7.4 %
MONOCYTES NFR BLD: 7.8 %
NEUTROPHILS # BLD AUTO: 4.9 K/UL
NEUTROPHILS # BLD AUTO: 5.2 K/UL
NEUTROPHILS # BLD AUTO: 6.4 K/UL
NEUTROPHILS NFR BLD: 73.1 %
NEUTROPHILS NFR BLD: 73.5 %
NEUTROPHILS NFR BLD: 77.9 %
NRBC BLD-RTO: 0 /100 WBC
PHOSPHATE SERPL-MCNC: 4.1 MG/DL
PLATELET # BLD AUTO: 152 K/UL
PLATELET # BLD AUTO: 156 K/UL
PLATELET # BLD AUTO: 161 K/UL
PMV BLD AUTO: 11.6 FL
PMV BLD AUTO: 12.1 FL
PMV BLD AUTO: 12.5 FL
POCT GLUCOSE: 115 MG/DL (ref 70–110)
POCT GLUCOSE: 127 MG/DL (ref 70–110)
POCT GLUCOSE: 129 MG/DL (ref 70–110)
POCT GLUCOSE: 135 MG/DL (ref 70–110)
POCT GLUCOSE: 141 MG/DL (ref 70–110)
POTASSIUM SERPL-SCNC: 4.1 MMOL/L
POTASSIUM SERPL-SCNC: 4.2 MMOL/L
PROT SERPL-MCNC: 6 G/DL
PROT SERPL-MCNC: 6 G/DL
PROT SERPL-MCNC: 6.5 G/DL
PROT SERPL-MCNC: 6.5 G/DL
PROTHROMBIN TIME: 14.5 SEC
RBC # BLD AUTO: 2.34 M/UL
RBC # BLD AUTO: 2.47 M/UL
RBC # BLD AUTO: 2.53 M/UL
SODIUM SERPL-SCNC: 154 MMOL/L
SODIUM SERPL-SCNC: 154 MMOL/L
SODIUM SERPL-SCNC: 155 MMOL/L
WBC # BLD AUTO: 6.66 K/UL
WBC # BLD AUTO: 7.14 K/UL
WBC # BLD AUTO: 8.16 K/UL

## 2018-06-19 PROCEDURE — 94640 AIRWAY INHALATION TREATMENT: CPT

## 2018-06-19 PROCEDURE — 25000003 PHARM REV CODE 250: Performed by: STUDENT IN AN ORGANIZED HEALTH CARE EDUCATION/TRAINING PROGRAM

## 2018-06-19 PROCEDURE — 25000003 PHARM REV CODE 250: Performed by: NURSE PRACTITIONER

## 2018-06-19 PROCEDURE — A4216 STERILE WATER/SALINE, 10 ML: HCPCS | Performed by: THORACIC SURGERY (CARDIOTHORACIC VASCULAR SURGERY)

## 2018-06-19 PROCEDURE — 25000003 PHARM REV CODE 250: Performed by: PHYSICIAN ASSISTANT

## 2018-06-19 PROCEDURE — 20600001 HC STEP DOWN PRIVATE ROOM

## 2018-06-19 PROCEDURE — 25000242 PHARM REV CODE 250 ALT 637 W/ HCPCS: Performed by: NURSE PRACTITIONER

## 2018-06-19 PROCEDURE — 97168 OT RE-EVAL EST PLAN CARE: CPT

## 2018-06-19 PROCEDURE — 36415 COLL VENOUS BLD VENIPUNCTURE: CPT

## 2018-06-19 PROCEDURE — 83735 ASSAY OF MAGNESIUM: CPT

## 2018-06-19 PROCEDURE — 25000003 PHARM REV CODE 250: Performed by: THORACIC SURGERY (CARDIOTHORACIC VASCULAR SURGERY)

## 2018-06-19 PROCEDURE — 97530 THERAPEUTIC ACTIVITIES: CPT

## 2018-06-19 PROCEDURE — 80053 COMPREHEN METABOLIC PANEL: CPT | Mod: 91

## 2018-06-19 PROCEDURE — 85025 COMPLETE CBC W/AUTO DIFF WBC: CPT

## 2018-06-19 PROCEDURE — 94761 N-INVAS EAR/PLS OXIMETRY MLT: CPT

## 2018-06-19 PROCEDURE — 85730 THROMBOPLASTIN TIME PARTIAL: CPT

## 2018-06-19 PROCEDURE — 85610 PROTHROMBIN TIME: CPT

## 2018-06-19 PROCEDURE — 92526 ORAL FUNCTION THERAPY: CPT

## 2018-06-19 PROCEDURE — 63600175 PHARM REV CODE 636 W HCPCS: Performed by: PHYSICIAN ASSISTANT

## 2018-06-19 PROCEDURE — 84100 ASSAY OF PHOSPHORUS: CPT

## 2018-06-19 RX ORDER — WARFARIN SODIUM 5 MG/1
5 TABLET ORAL DAILY
Status: DISCONTINUED | OUTPATIENT
Start: 2018-06-19 | End: 2018-06-20 | Stop reason: HOSPADM

## 2018-06-19 RX ORDER — IPRATROPIUM BROMIDE AND ALBUTEROL SULFATE 2.5; .5 MG/3ML; MG/3ML
3 SOLUTION RESPIRATORY (INHALATION) EVERY 4 HOURS
Status: DISCONTINUED | OUTPATIENT
Start: 2018-06-19 | End: 2018-06-20 | Stop reason: HOSPADM

## 2018-06-19 RX ADMIN — RAMELTEON 8 MG: 8 TABLET, FILM COATED ORAL at 09:06

## 2018-06-19 RX ADMIN — GUAIFENESIN AND DEXTROMETHORPHAN 5 ML: 100; 10 SYRUP ORAL at 12:06

## 2018-06-19 RX ADMIN — IPRATROPIUM BROMIDE AND ALBUTEROL SULFATE 3 ML: .5; 3 SOLUTION RESPIRATORY (INHALATION) at 09:06

## 2018-06-19 RX ADMIN — Medication 10 ML: at 09:06

## 2018-06-19 RX ADMIN — HYDRALAZINE HYDROCHLORIDE 25 MG: 25 TABLET, FILM COATED ORAL at 05:06

## 2018-06-19 RX ADMIN — CEFTRIAXONE SODIUM 2 G: 2 INJECTION, POWDER, FOR SOLUTION INTRAMUSCULAR; INTRAVENOUS at 12:06

## 2018-06-19 RX ADMIN — POLYETHYLENE GLYCOL 3350 17 G: 17 POWDER, FOR SOLUTION ORAL at 09:06

## 2018-06-19 RX ADMIN — GUAIFENESIN AND DEXTROMETHORPHAN 5 ML: 100; 10 SYRUP ORAL at 06:06

## 2018-06-19 RX ADMIN — CEFTRIAXONE SODIUM 2 G: 2 INJECTION, POWDER, FOR SOLUTION INTRAMUSCULAR; INTRAVENOUS at 02:06

## 2018-06-19 RX ADMIN — Medication 3 ML: at 02:06

## 2018-06-19 RX ADMIN — FUROSEMIDE 40 MG: 10 SOLUTION ORAL at 06:06

## 2018-06-19 RX ADMIN — ACETAMINOPHEN 650 MG: 650 SOLUTION ORAL at 12:06

## 2018-06-19 RX ADMIN — AMIODARONE HYDROCHLORIDE 200 MG: 200 TABLET ORAL at 09:06

## 2018-06-19 RX ADMIN — Medication 3 ML: at 05:06

## 2018-06-19 RX ADMIN — AMPICILLIN 2 G: 2 INJECTION, POWDER, FOR SOLUTION INTRAVENOUS at 06:06

## 2018-06-19 RX ADMIN — Medication 10 ML: at 01:06

## 2018-06-19 RX ADMIN — AMPICILLIN 2 G: 2 INJECTION, POWDER, FOR SOLUTION INTRAVENOUS at 12:06

## 2018-06-19 RX ADMIN — Medication 10 ML: at 12:06

## 2018-06-19 RX ADMIN — ASPIRIN 81 MG CHEWABLE TABLET 324 MG: 81 TABLET CHEWABLE at 09:06

## 2018-06-19 RX ADMIN — ATORVASTATIN CALCIUM 40 MG: 20 TABLET, FILM COATED ORAL at 09:06

## 2018-06-19 RX ADMIN — HYDRALAZINE HYDROCHLORIDE 25 MG: 25 TABLET, FILM COATED ORAL at 03:06

## 2018-06-19 RX ADMIN — MIRTAZAPINE 15 MG: 15 TABLET, FILM COATED ORAL at 09:06

## 2018-06-19 RX ADMIN — HYDRALAZINE HYDROCHLORIDE 25 MG: 25 TABLET, FILM COATED ORAL at 09:06

## 2018-06-19 RX ADMIN — WARFARIN SODIUM 5 MG: 5 TABLET ORAL at 06:06

## 2018-06-19 RX ADMIN — GUAIFENESIN AND DEXTROMETHORPHAN 5 ML: 100; 10 SYRUP ORAL at 10:06

## 2018-06-19 RX ADMIN — CEFTRIAXONE SODIUM 2 G: 2 INJECTION, POWDER, FOR SOLUTION INTRAMUSCULAR; INTRAVENOUS at 10:06

## 2018-06-19 RX ADMIN — AMPICILLIN 2 G: 2 INJECTION, POWDER, FOR SOLUTION INTRAVENOUS at 02:06

## 2018-06-19 RX ADMIN — Medication 10 ML: at 05:06

## 2018-06-19 RX ADMIN — FUROSEMIDE 40 MG: 10 SOLUTION ORAL at 01:06

## 2018-06-19 RX ADMIN — AMLODIPINE BESYLATE 10 MG: 10 TABLET ORAL at 09:06

## 2018-06-19 RX ADMIN — FAMOTIDINE 20 MG: 20 TABLET ORAL at 09:06

## 2018-06-19 RX ADMIN — DOXAZOSIN MESYLATE 4 MG: 2 TABLET ORAL at 09:06

## 2018-06-19 NOTE — SUBJECTIVE & OBJECTIVE
Interval History: No acute events overnight. NSR on monitor.     Medications:  Continuous Infusions:  Scheduled Meds:   amiodarone  200 mg Per G Tube Daily    amLODIPine  10 mg Per G Tube Daily    ampicillin IVPB  2 g Intravenous Q6H    aspirin  324 mg Per G Tube Daily    atorvastatin  40 mg Per G Tube Daily    cefTRIAXone (ROCEPHIN) IVPB  2 g Intravenous Q12H    doxazosin  4 mg Per G Tube Daily    famotidine  20 mg Per G Tube Daily    furosemide  40 mg Per G Tube BID    hydrALAZINE  25 mg Per G Tube Q8H    mirtazapine  15 mg Per G Tube QHS    polyethylene glycol  17 g Per G Tube Daily    ramelteon  8 mg Per G Tube QHS    sodium chloride 0.9%  10 mL Intravenous Q6H    sodium chloride 0.9%  3 mL Intravenous Q8H    warfarin  5 mg Per G Tube Daily     PRN Meds:acetaminophen, albuterol-ipratropium, bisacodyl, dextromethorphan-guaifenesin  mg/5 ml, dextrose 50%, docusate, glucagon (human recombinant), hydrALAZINE, insulin aspart U-100, lactated ringers, magnesium oxide, magnesium oxide, metoclopramide HCl, ondansetron, potassium chloride 10%, potassium chloride 10%, potassium chloride 10%, potassium, sodium phosphates, potassium, sodium phosphates, potassium, sodium phosphates, sodium chloride, Flushing PICC Protocol **AND** sodium chloride 0.9% **AND** sodium chloride 0.9%, white petrolatum-mineral oil     Objective:     Vital Signs (Most Recent):  Temp: 97.4 °F (36.3 °C) (06/19/18 1500)  Pulse: 89 (06/19/18 1540)  Resp: 18 (06/19/18 1500)  BP: (!) 113/55 (06/19/18 1500)  SpO2: (!) 86 % (06/19/18 1500) Vital Signs (24h Range):  Temp:  [97.4 °F (36.3 °C)-98.9 °F (37.2 °C)] 97.4 °F (36.3 °C)  Pulse:  [82-91] 89  Resp:  [18-20] 18  SpO2:  [86 %-99 %] 86 %  BP: ()/(53-59) 113/55     Weight: 68.4 kg (150 lb 12.7 oz)  Body mass index is 21.64 kg/m².    SpO2: (!) 86 %  O2 Device (Oxygen Therapy): room air    Intake/Output - Last 3 Shifts       06/17 0700 - 06/18 0659 06/18 0700 - 06/19 0659 06/19  0700 - 06/20 0659    P.O.  0     I.V. (mL/kg) 600 (8.4) 15 (0.2)     NG/GT 1500 950 750    IV Piggyback  100 100    TPN  600     Total Intake(mL/kg) 2100 (29.2) 1665 (23.2) 850 (12.4)    Urine (mL/kg/hr) 2680 (1.6) 2220 (1.3)     Total Output 2680 2220      Net -580 -555 +850           Stool Occurrence 1 x            Lines/Drains/Airways     Peripherally Inserted Central Catheter Line                 PICC Double Lumen 03/05/18 1539 right basilic 105 days         PICC Double Lumen 06/04/18 1654 left brachial 14 days          Central Venous Catheter Line                 Percutaneous Central Line Insertion/Assessment - Quad lumen  05/25/18 0800 left subclavian 25 days          Drain                 Gastrostomy/Enterostomy 06/07/18 1657 Gastrostomy-jejunostomy LUQ 11 days         Urethral Catheter 06/11/18 1711 Latex 7 days          Line                 Pacer Wires 05/25/18 1355 25 days                Physical Exam   Constitutional: He is oriented to person, place, and time. He appears well-developed and well-nourished.   HENT:   Head: Normocephalic and atraumatic.   Eyes: Pupils are equal, round, and reactive to light.   Neck: Normal range of motion. Neck supple.   Cardiovascular: Normal rate, regular rhythm and normal heart sounds.    Pulmonary/Chest: Effort normal and breath sounds normal.   Abdominal: Soft. Bowel sounds are normal.   Musculoskeletal: Normal range of motion.   Neurological: He is alert and oriented to person, place, and time.   Skin: Skin is warm and dry.   Psychiatric: He has a normal mood and affect. His behavior is normal.       Significant Labs:  CBC:   Recent Labs  Lab 06/19/18  1257   WBC 8.16   RBC 2.53*   HGB 7.5*   HCT 25.3*      *   MCH 29.6   MCHC 29.6*     CMP:   Recent Labs  Lab 06/19/18  1257   *   CALCIUM 8.8   ALBUMIN 2.5*   PROT 6.5   *  154*   K 4.2   CO2 31*   *   BUN 81*   CREATININE 2.5*   ALKPHOS 87   ALT 17   AST 24   BILITOT 1.0

## 2018-06-19 NOTE — PLAN OF CARE
Problem: Patient Care Overview  Goal: Plan of Care Review  Outcome: Ongoing (interventions implemented as appropriate)  No significant events occurred overnight. Pt remained free from falls/trauma/injury overnight. Turns/repositions independently in bed but breakdown noted to sacrum area.. POC reviewed with Pt, all questions answered. Pt denies SOB, CP, and palpitations. VSS. Will continue to monitor.

## 2018-06-19 NOTE — PLAN OF CARE
Problem: SLP Goal  Goal: SLP Goal  Speech Language Pathology Goals  Goals expected to be met by 6/22/2018  1. Pt will to tolerate 1/2 tsp bites puree while completing 2-3 swallows per bolus w/o overt S/S aspiration, MIN A  2. Pt will tolerate trials of tsp sips honey thickened liquids while completing 2-3 swallows per bolus w/o overt S/S aspiration, MIN A   3. Educate Pt and family on safe swallow strategies and S/S aspiration   4. Continue to assess swallow to determine feasibility of re-initiation of pleasure feeds   5. Pt will complete dysphagia exercises given min A    Goals expected to be met by 6/11 -unable to meet prior to transfer to ICU  1. Pt will tolerate puree & honey thickened liquids for pleasure while completing 2-3 swallows per bolus w/o overt S/S aspiration, MIN A   3. Educate Pt and family on safe swallow strategies and S/S aspiration   4. Continue to assess swallow to determine feasibility of PO upgrade   5. Pt will complete dysphagia exercises given min A      Speech Language Pathology Goals  Goals expected to be met by 6/7:  1. Pt will tolerate trials of puree w/o overt S/S aspiration, MIN A - CONTINUE  2. Pt will tolerate trials of nectar-thickened liquids w/o overt S/S aspiration, MIN A - DISCONTINUE  3. Educate Pt and family on safe swallow strategies and S/S aspiration - CONTINUE  4. Continue to assess swallow to determine feasibility of PO upgrade - CONTINUE      Goals expected to be met by 6/4: goals not met 2/2 transfer to ICU 5/30  1. Pt will participate in ongoing swallowing assessment to determine safest and least restrictive diet.           Outcome: Ongoing (interventions implemented as appropriate)  Goals remain appropriate. Puree/honey thick trials with ST only at this time. ST will continue to follow.   CAREY Recinos., CCC-SLP  Pager: [phone]  06/19/2018

## 2018-06-19 NOTE — PROGRESS NOTES
Attempted to see for sacral area .  Pt is up working with therapy at this time. Will try and reschedule to see at a later time.   Jessica Bruce RN CWON  [phone]

## 2018-06-19 NOTE — PLAN OF CARE
Problem: Occupational Therapy Goal  Goal: Occupational Therapy Goal  Updated Goals to be met by: 6/26/18     Patient will increase functional independence with ADLs by performing:    UE Dressing with Supervision.  LE Dressing with Supervision.  Grooming while standing at sink with Supervision.  Toileting from toilet with Supervision for hygiene and clothing management.   Toilet transfer to toilet with Supervision.  Functional mobility household distance with SBA.    Updated Goals to be met by: 6/19/18     Patient will increase functional independence with ADLs by performing:    UE Dressing with Supervision.  LE Dressing with Supervision.  Grooming while standing at sink with Supervision.  Toileting from toilet with Supervision for hygiene and clothing management.   Toilet transfer to toilet with Supervision.  Functional mobility household distance with SBA.    Goals to be met by: 6/10/18     Patient will increase functional independence with ADLs by performing:    Feeding with Modified Stevens.  UE Dressing with Supervision.  LE Dressing with Supervision.  Grooming while standing at sink with Supervision.  Toileting from toilet with Supervision for hygiene and clothing management.   Toilet transfer to toilet with Supervision.       Outcome: Ongoing (interventions implemented as appropriate)  Re-eval and POC set 6/19/18

## 2018-06-19 NOTE — PROGRESS NOTES
Mid sternal incision cleaned with chlorhexidene. Incision ARELIS , well approximated, no drainage noted

## 2018-06-19 NOTE — ASSESSMENT & PLAN NOTE
--Continue ASA, statin (holding BB)  --ENT consult for epistaxis; concern for aspiration of old blood clots from sinuses; decreased and improved  --CT chest consistent with volume overload  -- Satting well on RA, wean as tolerated for O2 sat >92%  -- Lasix- liquid per G tube   --Monitor H/H- stable   --Sternal precautions  --PT/OT  --Ambulate x4  --monitor CXR    --Monitor electrolytes and replace prn  --Coumadin 5mg tonight   -- Q6; increase for hypernatremia  --continue  lackey - will f/u as outpt  --strict I&Os  --did not pass modified swallow; PICC  --G-J tube placed; TF at goal        DISPO: Continue ICU care, discharge planning for home with .

## 2018-06-19 NOTE — PROGRESS NOTES
Consulted for sacral area   Pt and his wife report they are to be discharged to home today. Sacral area is mildly red and remains painful to patient . Barrier creams are in use and will obtain a cushion for the chair he can take home and use until his sacral area is feeling better. They report he stayed in the chair form 7am until late evening and then complained of sacral pain.      Jessica Bruce RN CWON  [phone]

## 2018-06-19 NOTE — PLAN OF CARE
CM received phone call from Yamilka at [facility]. Pt's wife stating she would like rocefin to be IVP. This is what they had last time. CM asked Asiya NP. She referred CM to call ID. CM spoke with staff. He stated to continue with IVPB. NP is agreeable to IVBP. QUANG updated Yamilka.

## 2018-06-19 NOTE — PT/OT/SLP PROGRESS
"Speech Language Pathology Treatment    Patient Name:  Rodolfo Messina   MRN:  [id number]   CTSU 302/CTSU 302 A    Admitting Diagnosis: S/P MVR (mitral valve replacement)    Recommendations:                 General Recommendations:  Dysphagia therapy  Diet recommendations:  NPO, Liquid Diet Level: NPO Continue PEG tube feedings for nutrition/hydration/medication   Aspiration Precautions: Frequent oral care and Strict aspiration precautions   General Precautions: Standard, fall, aspiration  Communication strategies:  none    Subjective     CXR 6/18/18: FINDINGS: Decreased prominence of interstitial markings and clearing of airspace consolidation in both lungs since 06/17/2018 is appreciated, the clearing particularly evident in the right lower lung zone.  There has been no detrimental interval change in the appearance of the chest since that time.  No pneumothorax.    Patient awake, cooperative, and seated upright in chair. RN present in room providing tube feeds/medication administration. Patient's wife present in room.   Patient states, "Can I try the pleasure feed stuff again?"    Pain/Comfort:  ·   no pain reported    Objective:     Has the patient been evaluated by SLP for swallowing?   Yes  Keep patient NPO? Yes   Current Respiratory Status: room air      Patient seen for dysphagia therapy. He reports independent completion of exercises s/p ST session yesterday. Intermittent throat clears noted throughout session and during conversation. Patient's wife reports increased coughing noted yesterday. Handouts listing swallowing exercises present at bedside. Honey thick apple juice via tsp sips x3 utilized to facilitate effortful swallows. Patient independently completed 3-4 effortful swallows per sip. Noted throat clearing s/p all trials. Patient's wife reported 1 tsp administered s/p ST session yesterday, however, no further pleasure feedings given. SLP discussed puree/honey thick liquid trials with ST only at this " time 2/2 noted throat clearing with trials. Patient and wife in agreement. He completed Chin Tuck Against Resistance exercise with SLP assistance, Mendelsohn maneuver with mod cues for accurate completion, falsetto exercise, and hard /g/ and /k/ words.   Continued education provided on s/s of aspiration, importance of oral care, po trials with ST only at this time, ongoing POC, and importance of completing all swallowing exercises multiple times a day. Patient and wife verbalized understanding and are in agreement with POC.      Assessment:     Rodolfo Messina is a 68 y.o. male with an SLP diagnosis of Dysphagia.  He presents with severe oropharyngeal dyspahgia. ST will continue to follow.     Goals:    SLP Goals        Problem: SLP Goal    Goal Priority Disciplines Outcome   SLP Goal     SLP Ongoing (interventions implemented as appropriate)   Description:  Speech Language Pathology Goals  Goals expected to be met by 6/22/2018  1. Pt will to tolerate 1/2 tsp bites puree while completing 2-3 swallows per bolus w/o overt S/S aspiration, MIN A  2. Pt will tolerate trials of tsp sips honey thickened liquids while completing 2-3 swallows per bolus w/o overt S/S aspiration, MIN A   3. Educate Pt and family on safe swallow strategies and S/S aspiration   4. Continue to assess swallow to determine feasibility of re-initiation of pleasure feeds   5. Pt will complete dysphagia exercises given min A    Goals expected to be met by 6/11 -unable to meet prior to transfer to ICU  1. Pt will tolerate puree & honey thickened liquids for pleasure while completing 2-3 swallows per bolus w/o overt S/S aspiration, MIN A   3. Educate Pt and family on safe swallow strategies and S/S aspiration   4. Continue to assess swallow to determine feasibility of PO upgrade   5. Pt will complete dysphagia exercises given min A      Speech Language Pathology Goals  Goals expected to be met by 6/7:  1. Pt will tolerate trials of puree w/o overt S/S  aspiration, MIN A - CONTINUE  2. Pt will tolerate trials of nectar-thickened liquids w/o overt S/S aspiration, MIN A - DISCONTINUE  3. Educate Pt and family on safe swallow strategies and S/S aspiration - CONTINUE  4. Continue to assess swallow to determine feasibility of PO upgrade - CONTINUE      Goals expected to be met by 6/4: goals not met 2/2 transfer to ICU 5/30  1. Pt will participate in ongoing swallowing assessment to determine safest and least restrictive diet.                            Plan:     · Patient to be seen:  5 x/week   · Plan of Care expires:  06/30/18  · Plan of Care reviewed with:  patient, spouse   · SLP Follow-Up:  Yes       Discharge recommendations:  rehabilitation facility       Time Tracking:     SLP Treatment Date:   06/19/18  Speech Start Time:  0940  Speech Stop Time:  1003     Speech Total Time (min):  23 min    Billable Minutes: Treatment Swallowing Dysfunction 23     IRVIN Obando, CCC-SLP   Pager: [phone]  06/19/2018

## 2018-06-19 NOTE — PLAN OF CARE
CM informed pt and wife to f/u with urology clinic. Wife wanted CM to call Dr. Ashraf to see if pt can get rocefin 2 GM IVP instead of IVPB. CM called ID clinic and they will give MD nurse message. CM will update pt's wife.

## 2018-06-19 NOTE — PHYSICIAN QUERY
"PT Name: Rodolfo Messina  MR #: [id number]    Physician Query Form - Heart  Condition Clarification     CDS/: Negra Melara RN, CCDS              Contact information: [email or web address]  This form is a permanent document in the medical record.     Query Date: June 19, 2018    By submitting this query, we are merely seeking further clarification of documentation. Please utilize your independent clinical judgment when addressing the question(s) below.    The medical record contains the following   Indicators     Supporting Clinical Findings Location in Medical Record    BNP     X EF Ef 35- 40% 5/30 echo   X Radiology findings Interval worsening of pulmonary edema.    CT chest consistent with volume overload 6/13 cxr    6/16 prog note   X Echo Results The left ventricle is normal in size with moderately depressed systolic function and septal motion consistent with prior cardiac surgery.  The estimated LVEF is 35-40%. 5/30 echo    "Ascites" documented     X "SOB" or "HAQ" documented Called into pt room by wife, pt stating he was having trouble breathing. Course and wet lung sounds, O2 sats 85%, labored breathing    re-admitted to the SICU 6/13 given SOB and increased work of breathing. 6/13 nurse note        6/13 cc h/p   X "Hypoxia" documented Acute hypoxemic respiratory failure  Cardiogenic pulmonary edema 6/15 pulm note    Heart Failure documented Chronic systolic CHF 5/25- 6/18 prog notes   X "Edema" documented ? Radiographic findings and physical exam suggestive of cardiogenic pulmonary edema. 6/15 pulm note   X Diuretics/Meds ? Agree with continued aggressive diuresis.  ? Furosemide 40 mg IV x1  ? Furosemide 40 mg IV x1  ? Furosemide 49 mg IV x1  ? Furosemide gtt   Diuril x2 yesterday with little response; lasix 40 mg x1 with good response overnight 6/15 pulm note    6/13 mar  6/15 mar  6/16 mar  6/16- 6/18 mar  6/16 prog note   X Treatment: Pt placed on venti mask, little improvement. Pt did not improve " after duoneb and required nonrebreather to increase sats >92%. MD called to bedside. ABGs ordered. D5@50 and TF stopped for now. Lasix given and orders to move to SICU.     Currently on 12L high flow NC O2    Patient on 8L venti mask. SATs 94-97%. 6/13 nurse note              6/14 prog note    6/15 nurse note    Other:          Provider, please specify diagnosis or diagnoses associated with above clinical findings.                                 [ X ] Acute on Chronic Systolic Heart Failure ( EF < 40)*  [  ] Chronic Systolic Heart Failure (EF < 40)*  [  ] Other Type of Heart Failure (please specify type): _________________________  [  ] Other (please specify): ___________________________________  [  ] Clinically Undetermined            *American Heart Association                                                                                                          Please document in your progress notes daily for the duration of treatment until resolved and include in your discharge summary.

## 2018-06-19 NOTE — PROGRESS NOTES
[facility]  Cardiothoracic Surgery  Progress Note    Patient Name: Rodolfo Messina  MRN: [id number]  Admission Date: 5/23/2018  Hospital Length of Stay: 27 days  Code Status: Full Code   Attending Physician: Marvin Go MD   Referring Provider: Marvin Go MD  Principal Problem:S/P MVR (mitral valve replacement)    Subjective:     Post-Op Info:  Procedure(s) (LRB):  Mitral Valve Replacement (N/A)  AORTOCORONARY BYPASS-CABG (N/A)   25 Days Post-Op     Interval History: No acute events overnight. NSR on monitor.     Medications:  Continuous Infusions:  Scheduled Meds:   amiodarone  200 mg Per G Tube Daily    amLODIPine  10 mg Per G Tube Daily    ampicillin IVPB  2 g Intravenous Q6H    aspirin  324 mg Per G Tube Daily    atorvastatin  40 mg Per G Tube Daily    cefTRIAXone (ROCEPHIN) IVPB  2 g Intravenous Q12H    doxazosin  4 mg Per G Tube Daily    famotidine  20 mg Per G Tube Daily    furosemide  40 mg Per G Tube BID    hydrALAZINE  25 mg Per G Tube Q8H    mirtazapine  15 mg Per G Tube QHS    polyethylene glycol  17 g Per G Tube Daily    ramelteon  8 mg Per G Tube QHS    sodium chloride 0.9%  10 mL Intravenous Q6H    sodium chloride 0.9%  3 mL Intravenous Q8H    warfarin  5 mg Per G Tube Daily     PRN Meds:acetaminophen, albuterol-ipratropium, bisacodyl, dextromethorphan-guaifenesin  mg/5 ml, dextrose 50%, docusate, glucagon (human recombinant), hydrALAZINE, insulin aspart U-100, lactated ringers, magnesium oxide, magnesium oxide, metoclopramide HCl, ondansetron, potassium chloride 10%, potassium chloride 10%, potassium chloride 10%, potassium, sodium phosphates, potassium, sodium phosphates, potassium, sodium phosphates, sodium chloride, Flushing PICC Protocol **AND** sodium chloride 0.9% **AND** sodium chloride 0.9%, white petrolatum-mineral oil     Objective:     Vital Signs (Most Recent):  Temp: 97.4 °F (36.3 °C) (06/19/18 1500)  Pulse: 89 (06/19/18 1540)  Resp: 18  (06/19/18 1500)  BP: (!) 113/55 (06/19/18 1500)  SpO2: (!) 86 % (06/19/18 1500) Vital Signs (24h Range):  Temp:  [97.4 °F (36.3 °C)-98.9 °F (37.2 °C)] 97.4 °F (36.3 °C)  Pulse:  [82-91] 89  Resp:  [18-20] 18  SpO2:  [86 %-99 %] 86 %  BP: ()/(53-59) 113/55     Weight: 68.4 kg (150 lb 12.7 oz)  Body mass index is 21.64 kg/m².    SpO2: (!) 86 %  O2 Device (Oxygen Therapy): room air    Intake/Output - Last 3 Shifts       06/17 0700 - 06/18 0659 06/18 0700 - 06/19 0659 06/19 0700 - 06/20 0659    P.O.  0     I.V. (mL/kg) 600 (8.4) 15 (0.2)     NG/GT 1500 950 750    IV Piggyback  100 100    TPN  600     Total Intake(mL/kg) 2100 (29.2) 1665 (23.2) 850 (12.4)    Urine (mL/kg/hr) 2680 (1.6) 2220 (1.3)     Total Output 2680 2220      Net -580 -555 +850           Stool Occurrence 1 x            Lines/Drains/Airways     Peripherally Inserted Central Catheter Line                 PICC Double Lumen 03/05/18 1539 right basilic 105 days         PICC Double Lumen 06/04/18 1654 left brachial 14 days          Central Venous Catheter Line                 Percutaneous Central Line Insertion/Assessment - Quad lumen  05/25/18 0800 left subclavian 25 days          Drain                 Gastrostomy/Enterostomy 06/07/18 1657 Gastrostomy-jejunostomy LUQ 11 days         Urethral Catheter 06/11/18 1711 Latex 7 days          Line                 Pacer Wires 05/25/18 1355 25 days                Physical Exam   Constitutional: He is oriented to person, place, and time. He appears well-developed and well-nourished.   HENT:   Head: Normocephalic and atraumatic.   Eyes: Pupils are equal, round, and reactive to light.   Neck: Normal range of motion. Neck supple.   Cardiovascular: Normal rate, regular rhythm and normal heart sounds.    Pulmonary/Chest: Effort normal and breath sounds normal.   Abdominal: Soft. Bowel sounds are normal.   Musculoskeletal: Normal range of motion.   Neurological: He is alert and oriented to person, place, and time.    Skin: Skin is warm and dry.   Psychiatric: He has a normal mood and affect. His behavior is normal.       Significant Labs:  CBC:   Recent Labs  Lab 06/19/18  1257   WBC 8.16   RBC 2.53*   HGB 7.5*   HCT 25.3*      *   MCH 29.6   MCHC 29.6*     CMP:   Recent Labs  Lab 06/19/18  1257   *   CALCIUM 8.8   ALBUMIN 2.5*   PROT 6.5   *  154*   K 4.2   CO2 31*   *   BUN 81*   CREATININE 2.5*   ALKPHOS 87   ALT 17   AST 24   BILITOT 1.0     Assessment/Plan:     * S/P MVR (mitral valve replacement)    --Continue ASA, statin (holding BB)  --ENT consult for epistaxis; concern for aspiration of old blood clots from sinuses; decreased and improved  --CT chest consistent with volume overload  -- Satting well on RA, wean as tolerated for O2 sat >92%  -- Lasix- liquid per G tube   --Monitor H/H- stable   --Sternal precautions  --PT/OT  --Ambulate x4  --monitor CXR    --Monitor electrolytes and replace prn  --Coumadin 5mg tonight   -- Q6; increase for hypernatremia  --continue  lackey - will f/u as outpt  --strict I&Os  --did not pass modified swallow; PICC  --G-J tube placed; TF at goal        DISPO: Continue ICU care, discharge planning for home with .           Dysphagia    --SLP following  --failed last BMS study   --GJ tube placed by IR 6/7        Impaired functional mobility and endurance    --discharging home with HH  --PT/OT daily         On enteral nutrition    --TFs at goal        Epistaxis    --Noted to have post traumatic left sided epistaxis following attempted NGT placement. On the time of evaluation patient was hemostatic. No active bleeding noted.   -Given that patient is hemostatic, no acute intervention   -Recommend minimize nasal trauma as best possible  -If requires oxygen, ideal if it is humidified via face tent and not nasal canula  -Bacitracin to bilateral nares nightly  -Afrin BID x 3 days  -Ocean nasal spray q6hrs while awake  -If bleeding restarts, hold pressure  to fleshy portion of nose (10 mins) and lean forward  -For bleeding that does not stop, page resident  -Remainder of medical management per primary team        Atrial flutter    --EP following appreciate recs., will reach out to them- pt currently in Afib that began 6/6- Amio IV load and infusion, now on PO amio  --holding BB        S/P CABG x 1    --see MVR         Chronic systolic congestive heart failure    - ASA, statin  - D/c beta blocker  - EP following; appreciate recs          ANNA (acute kidney injury)    --Elevated Cr  --D/c lasix gtt and start pushes            Asiya Fernandez, EDUARDA  Cardiothoracic Surgery  [facility]

## 2018-06-19 NOTE — PLAN OF CARE
TRENT sent updated orders to Bong REED and [facility].  TRENT informed them that the pt will dc today.  Bong will see the pt tomorrow.  Yamilka with [facility] discussed copays with the pt and his wife.  They are agreeable and will be taught the TF and IV abx today before dc. Pt will be hooked up to IV abx at the hospital and the TF will be delivered to the hospital room.     Radha Montes, [facility] x [phone]

## 2018-06-19 NOTE — PLAN OF CARE
QUANG spoke with Asiya LERNER. NP wants to add BMP to HH orders. Also, to make f/u appt with Urology for voiding trial. QUANG called Urology office and made appt for pt on June 26, 2018 at 1:20pm for voiding trial with Delmy MEYER

## 2018-06-19 NOTE — PT/OT/SLP RE-EVAL
Occupational Therapy   Re-evaluation    Name: Rodolfo Messina  MRN: [id number]  Admitting Diagnosis:  S/P MVR (mitral valve replacement) 25 Days Post-Op    Recommendations:     Discharge Recommendations: home with home health  Discharge Equipment Recommendations:  none  Barriers to discharge:  None    History:     Past Medical History:   Diagnosis Date    ANNA (acute kidney injury) 2/22/2018    Anemia     Anxiety     Arthritis     BPH (benign prostatic hyperplasia)     CHF (congestive heart failure) 5/23/2018    Coronary artery disease of native artery of native heart with stable angina pectoris 5/21/2018    Diverticulosis     Encounter for blood transfusion     Hypertension     Urinary retention        Past Surgical History:   Procedure Laterality Date    APPENDECTOMY      COLONOSCOPY      COLONOSCOPY N/A 2/1/2018    Procedure: COLONOSCOPY;  Surgeon: Richar Payan MD;  Location: [facility] ENDO (4TH FLR);  Service: Endoscopy;  Laterality: N/A;  PM prep    CREATION OF AORTOCORONARY ARTERY BYPASS N/A 5/25/2018    Procedure: AORTOCORONARY BYPASS-CABG;  Surgeon: Marvin Go MD;  [facility];  Service: Cardiovascular;  Laterality: N/A;    EYE SURGERY Right     cataract extraction    FINGER SURGERY      FRACTURE SURGERY Right     index finger    MITRAL VALVE REPLACEMENT N/A 5/25/2018    Procedure: Mitral Valve Replacement;  Surgeon: Marvin Go MD;  [facility];  Service: Cardiovascular;  Laterality: N/A;    TONSILLECTOMY         Subjective     Chief Complaint: Pain in L groin  Patient/Family stated goals: Return home  Communicated with: RN prior to session. Pt agreeable to OT.  Pain/Comfort:  · Pain Rating 1:  (Did not rate)  · Location - Side 1: Left  · Location 1: groin  · Pain Addressed 1: Reposition, Distraction    Objective:     Patient found with: peripheral IV, telemetry, lackey catheter, PEG Tube    General Precautions: Standard, fall, sternal   Orthopedic  Precautions:N/A   Braces: N/A     Occupational Performance:    Bed Mobility:    · NT, pt sitting up in chair    Functional Mobility/Transfers:  · Patient completed Sit <> Stand Transfer with CGA from bedside chair, no AD; able to maintain sternal precautions without cues  · Functional mobility around unit ~450 ft with CGA no AD, instability noted    Activities of Daily Living:  · Grooming: supervision seated in chair  · UB Dressing: Min A to don gown around back      Cognitive/Visual Perceptual:  WFL    Physical Exam:  Pt demo WFL B UE ROM, decreased dynamic standing balance    Patient left up in chair with all lines intact, call button in reach and wife present    [facility] 6 Click:  [facility] Total Score: 20    Treatment & Education:  OT re-eval; educated on OT role and POC and to call for assistance before getting up and if wanting to ambulate; white board updated  Education:    Assessment:     Rodolfo Messina is a 68 y.o. male with a medical diagnosis of S/P MVR (mitral valve replacement).  He presents with performance deficits including weakness, impaired endurance, impaired self care skills, impaired balance, impaired functional mobilty, decreased safety awareness, impaired cardiopulmonary response to activity. Pt would continue to benefit from OT to maximize functional independence and safety before discharge home.      Rehab Prognosis:  Good; patient would benefit from acute skilled OT services to address these deficits and reach maximum level of function.       Plan:     Patient to be seen 4 x/week to address the above listed problems via self-care/home management, therapeutic activities, therapeutic exercises  · Plan of Care Expires: 07/19/18  · Plan of Care Reviewed with: patient, spouse    This Plan of care has been discussed with the patient who was involved in its development and understands and is in agreement with the identified goals and treatment plan    GOALS:    Occupational Therapy Goals         Problem: Occupational Therapy Goal    Goal Priority Disciplines Outcome Interventions   Occupational Therapy Goal     OT, PT/OT Ongoing (interventions implemented as appropriate)    Description:  Updated Goals to be met by: 6/26/18     Patient will increase functional independence with ADLs by performing:    UE Dressing with Supervision.  LE Dressing with Supervision.  Grooming while standing at sink with Supervision.  Toileting from toilet with Supervision for hygiene and clothing management.   Toilet transfer to toilet with Supervision.  Functional mobility household distance with SBA.    Updated Goals to be met by: 6/19/18     Patient will increase functional independence with ADLs by performing:    UE Dressing with Supervision.  LE Dressing with Supervision.  Grooming while standing at sink with Supervision.  Toileting from toilet with Supervision for hygiene and clothing management.   Toilet transfer to toilet with Supervision.  Functional mobility household distance with SBA.    Goals to be met by: 6/10/18     Patient will increase functional independence with ADLs by performing:    Feeding with Modified [place].  UE Dressing with Supervision.  LE Dressing with Supervision.  Grooming while standing at sink with Supervision.  Toileting from toilet with Supervision for hygiene and clothing management.   Toilet transfer to toilet with Supervision.                         Time Tracking:     OT Date of Treatment: 06/19/18  OT Start Time: 1056  OT Stop Time: 1116  OT Total Time (min): 20 min    Billable Minutes:Re-eval 10  Therapeutic Activity 10    MILTON Jasso  6/19/2018

## 2018-06-19 NOTE — NURSING
Pt transferred from SICU [address] to CSU. Stage II noted to sacrum. Turning pt q2hrs. Barrier cream applied. Positional wedge in place. Wound care consulted. Will continue to monitor. Oziel Bird RN

## 2018-06-20 ENCOUNTER — TELEPHONE (OUTPATIENT)
Dept: INFECTIOUS DISEASES | Facility: CLINIC | Age: 68
End: 2018-06-20

## 2018-06-20 VITALS
RESPIRATION RATE: 15 BRPM | OXYGEN SATURATION: 93 % | TEMPERATURE: 98 F | SYSTOLIC BLOOD PRESSURE: 93 MMHG | BODY MASS INDEX: 20.89 KG/M2 | HEART RATE: 95 BPM | DIASTOLIC BLOOD PRESSURE: 53 MMHG | HEIGHT: 70 IN | WEIGHT: 145.94 LBS

## 2018-06-20 PROBLEM — I48.92 ATRIAL FLUTTER: Status: RESOLVED | Noted: 2018-05-29 | Resolved: 2018-06-20

## 2018-06-20 PROBLEM — I44.2 COMPLETE HEART BLOCK: Status: RESOLVED | Noted: 2018-05-28 | Resolved: 2018-06-20

## 2018-06-20 PROBLEM — R00.1 JUNCTIONAL BRADYCARDIA: Status: RESOLVED | Noted: 2018-05-28 | Resolved: 2018-06-20

## 2018-06-20 PROBLEM — R06.02 SOB (SHORTNESS OF BREATH): Status: RESOLVED | Noted: 2018-06-13 | Resolved: 2018-06-20

## 2018-06-20 LAB
ALBUMIN SERPL BCP-MCNC: 2.4 G/DL
ALP SERPL-CCNC: 77 U/L
ALT SERPL W/O P-5'-P-CCNC: 18 U/L
ANION GAP SERPL CALC-SCNC: 8 MMOL/L
APTT BLDCRRT: 30.6 SEC
AST SERPL-CCNC: 24 U/L
BASOPHILS # BLD AUTO: 0.01 K/UL
BASOPHILS NFR BLD: 0.1 %
BILIRUB SERPL-MCNC: 0.7 MG/DL
BUN SERPL-MCNC: 79 MG/DL
CALCIUM SERPL-MCNC: 8.9 MG/DL
CHLORIDE SERPL-SCNC: 114 MMOL/L
CO2 SERPL-SCNC: 30 MMOL/L
CREAT SERPL-MCNC: 2.4 MG/DL
DIFFERENTIAL METHOD: ABNORMAL
EOSINOPHIL # BLD AUTO: 0.5 K/UL
EOSINOPHIL NFR BLD: 7.1 %
ERYTHROCYTE [DISTWIDTH] IN BLOOD BY AUTOMATED COUNT: 15.4 %
EST. GFR  (AFRICAN AMERICAN): 30.9 ML/MIN/1.73 M^2
EST. GFR  (NON AFRICAN AMERICAN): 26.7 ML/MIN/1.73 M^2
GLUCOSE SERPL-MCNC: 124 MG/DL
HCT VFR BLD AUTO: 22.8 %
HGB BLD-MCNC: 7 G/DL
IMM GRANULOCYTES # BLD AUTO: 0.04 K/UL
IMM GRANULOCYTES NFR BLD AUTO: 0.5 %
INR PPP: 1.6
LYMPHOCYTES # BLD AUTO: 0.9 K/UL
LYMPHOCYTES NFR BLD: 11.8 %
MAGNESIUM SERPL-MCNC: 2.3 MG/DL
MCH RBC QN AUTO: 30.4 PG
MCHC RBC AUTO-ENTMCNC: 30.7 G/DL
MCV RBC AUTO: 99 FL
MONOCYTES # BLD AUTO: 0.6 K/UL
MONOCYTES NFR BLD: 8.2 %
NEUTROPHILS # BLD AUTO: 5.5 K/UL
NEUTROPHILS NFR BLD: 72.3 %
NRBC BLD-RTO: 0 /100 WBC
PHOSPHATE SERPL-MCNC: 3.4 MG/DL
PLATELET # BLD AUTO: 149 K/UL
PMV BLD AUTO: 12.1 FL
POCT GLUCOSE: 146 MG/DL (ref 70–110)
POTASSIUM SERPL-SCNC: 3.9 MMOL/L
PROT SERPL-MCNC: 6 G/DL
PROTHROMBIN TIME: 15.6 SEC
RBC # BLD AUTO: 2.3 M/UL
SODIUM SERPL-SCNC: 152 MMOL/L
WBC # BLD AUTO: 7.56 K/UL

## 2018-06-20 PROCEDURE — 97535 SELF CARE MNGMENT TRAINING: CPT

## 2018-06-20 PROCEDURE — 85730 THROMBOPLASTIN TIME PARTIAL: CPT

## 2018-06-20 PROCEDURE — 25000242 PHARM REV CODE 250 ALT 637 W/ HCPCS: Performed by: NURSE PRACTITIONER

## 2018-06-20 PROCEDURE — 94761 N-INVAS EAR/PLS OXIMETRY MLT: CPT

## 2018-06-20 PROCEDURE — 97110 THERAPEUTIC EXERCISES: CPT

## 2018-06-20 PROCEDURE — 63600175 PHARM REV CODE 636 W HCPCS: Performed by: PHYSICIAN ASSISTANT

## 2018-06-20 PROCEDURE — 97116 GAIT TRAINING THERAPY: CPT

## 2018-06-20 PROCEDURE — 25000003 PHARM REV CODE 250: Performed by: STUDENT IN AN ORGANIZED HEALTH CARE EDUCATION/TRAINING PROGRAM

## 2018-06-20 PROCEDURE — 25000003 PHARM REV CODE 250: Performed by: NURSE PRACTITIONER

## 2018-06-20 PROCEDURE — 85025 COMPLETE CBC W/AUTO DIFF WBC: CPT

## 2018-06-20 PROCEDURE — 25000003 PHARM REV CODE 250: Performed by: THORACIC SURGERY (CARDIOTHORACIC VASCULAR SURGERY)

## 2018-06-20 PROCEDURE — 97530 THERAPEUTIC ACTIVITIES: CPT

## 2018-06-20 PROCEDURE — 83735 ASSAY OF MAGNESIUM: CPT

## 2018-06-20 PROCEDURE — 25000003 PHARM REV CODE 250: Performed by: PHYSICIAN ASSISTANT

## 2018-06-20 PROCEDURE — 85610 PROTHROMBIN TIME: CPT

## 2018-06-20 PROCEDURE — A4216 STERILE WATER/SALINE, 10 ML: HCPCS | Performed by: THORACIC SURGERY (CARDIOTHORACIC VASCULAR SURGERY)

## 2018-06-20 PROCEDURE — 80053 COMPREHEN METABOLIC PANEL: CPT

## 2018-06-20 PROCEDURE — 92526 ORAL FUNCTION THERAPY: CPT

## 2018-06-20 PROCEDURE — 84100 ASSAY OF PHOSPHORUS: CPT

## 2018-06-20 PROCEDURE — 99900035 HC TECH TIME PER 15 MIN (STAT)

## 2018-06-20 PROCEDURE — 94640 AIRWAY INHALATION TREATMENT: CPT

## 2018-06-20 RX ORDER — POTASSIUM CHLORIDE 20 MEQ/15ML
20 SOLUTION ORAL 2 TIMES DAILY
Qty: 473 ML | Refills: 0 | Status: ON HOLD | OUTPATIENT
Start: 2018-06-20 | End: 2018-07-13

## 2018-06-20 RX ORDER — DOXAZOSIN 4 MG/1
4 TABLET ORAL DAILY
Qty: 30 TABLET | Refills: 11 | Status: ON HOLD | OUTPATIENT
Start: 2018-06-21 | End: 2018-07-13 | Stop reason: HOSPADM

## 2018-06-20 RX ORDER — WARFARIN SODIUM 5 MG/1
5 TABLET ORAL DAILY
Qty: 30 TABLET | Refills: 11 | Status: ON HOLD | OUTPATIENT
Start: 2018-06-20 | End: 2018-07-13 | Stop reason: HOSPADM

## 2018-06-20 RX ORDER — AMIODARONE HYDROCHLORIDE 200 MG/1
200 TABLET ORAL DAILY
Qty: 30 TABLET | Refills: 11 | Status: SHIPPED | OUTPATIENT
Start: 2018-06-21 | End: 2018-07-23 | Stop reason: ALTCHOICE

## 2018-06-20 RX ORDER — AMLODIPINE BESYLATE 10 MG/1
10 TABLET ORAL DAILY
Qty: 30 TABLET | Refills: 11 | Status: SHIPPED | OUTPATIENT
Start: 2018-06-21 | End: 2018-07-30

## 2018-06-20 RX ORDER — NAPROXEN SODIUM 220 MG/1
325 TABLET, FILM COATED ORAL DAILY
Refills: 0 | Status: ON HOLD | COMMUNITY
Start: 2018-06-21 | End: 2019-10-17 | Stop reason: HOSPADM

## 2018-06-20 RX ORDER — HYDRALAZINE HYDROCHLORIDE 25 MG/1
25 TABLET, FILM COATED ORAL EVERY 8 HOURS
Qty: 90 TABLET | Refills: 11 | Status: ON HOLD | OUTPATIENT
Start: 2018-06-20 | End: 2018-07-13 | Stop reason: HOSPADM

## 2018-06-20 RX ORDER — ATORVASTATIN CALCIUM 40 MG/1
40 TABLET, FILM COATED ORAL DAILY
Qty: 90 TABLET | Refills: 3 | Status: SHIPPED | OUTPATIENT
Start: 2018-06-21 | End: 2018-12-13 | Stop reason: SDUPTHER

## 2018-06-20 RX ADMIN — Medication 10 ML: at 12:06

## 2018-06-20 RX ADMIN — FAMOTIDINE 20 MG: 20 TABLET ORAL at 08:06

## 2018-06-20 RX ADMIN — Medication 10 ML: at 05:06

## 2018-06-20 RX ADMIN — Medication 3 ML: at 01:06

## 2018-06-20 RX ADMIN — ASPIRIN 81 MG CHEWABLE TABLET 324 MG: 81 TABLET CHEWABLE at 08:06

## 2018-06-20 RX ADMIN — AMLODIPINE BESYLATE 10 MG: 10 TABLET ORAL at 08:06

## 2018-06-20 RX ADMIN — FUROSEMIDE 40 MG: 10 SOLUTION ORAL at 08:06

## 2018-06-20 RX ADMIN — POLYETHYLENE GLYCOL 3350 17 G: 17 POWDER, FOR SOLUTION ORAL at 08:06

## 2018-06-20 RX ADMIN — HYDRALAZINE HYDROCHLORIDE 25 MG: 25 TABLET, FILM COATED ORAL at 05:06

## 2018-06-20 RX ADMIN — AMIODARONE HYDROCHLORIDE 200 MG: 200 TABLET ORAL at 08:06

## 2018-06-20 RX ADMIN — HYDRALAZINE HYDROCHLORIDE 25 MG: 25 TABLET, FILM COATED ORAL at 01:06

## 2018-06-20 RX ADMIN — Medication 3 ML: at 05:06

## 2018-06-20 RX ADMIN — AMPICILLIN 2 G: 2 INJECTION, POWDER, FOR SOLUTION INTRAVENOUS at 01:06

## 2018-06-20 RX ADMIN — IPRATROPIUM BROMIDE AND ALBUTEROL SULFATE 3 ML: .5; 3 SOLUTION RESPIRATORY (INHALATION) at 03:06

## 2018-06-20 RX ADMIN — AMPICILLIN 2 G: 2 INJECTION, POWDER, FOR SOLUTION INTRAVENOUS at 12:06

## 2018-06-20 RX ADMIN — ATORVASTATIN CALCIUM 40 MG: 20 TABLET, FILM COATED ORAL at 08:06

## 2018-06-20 RX ADMIN — CEFTRIAXONE SODIUM 2 G: 2 INJECTION, POWDER, FOR SOLUTION INTRAMUSCULAR; INTRAVENOUS at 12:06

## 2018-06-20 RX ADMIN — AMPICILLIN 2 G: 2 INJECTION, POWDER, FOR SOLUTION INTRAVENOUS at 06:06

## 2018-06-20 RX ADMIN — DOXAZOSIN MESYLATE 4 MG: 2 TABLET ORAL at 08:06

## 2018-06-20 NOTE — NURSING
Pt received discharge instructions. Instructions explained in details by EDUARDA Braden. Vss. On today, PICC line dressing changed performed. Scant amount of blood on gauze noted. Pt tolerated it well. Awaiting antibiotics and tube feeding connection by care point. Also, family transport for pickup.

## 2018-06-20 NOTE — PT/OT/SLP PROGRESS
Occupational Therapy   Treatment/Discharge    Name: Rodolfo Messina  MRN: [id number]  Admitting Diagnosis:  S/P MVR (mitral valve replacement)  26 Days Post-Op    Recommendations:     Discharge Recommendations: home with home health  Discharge Equipment Recommendations:  none  Barriers to discharge:  None    Subjective     Communicated with: RN prior to session.  Pain/Comfort:  · Pain Rating 1: 0/10  · Pain Rating Post-Intervention 1: 0/10    Patients cultural, spiritual, Restorationist conflicts given the current situation: None    Objective:     Patient found with: telemetry, peripheral IV, PEG Tube, lackey catheter    General Precautions: Standard, aspiration, fall, sternal, NPO   Orthopedic Precautions:N/A   Braces: N/A     Occupational Performance:    Bed Mobility:    · Patient completed Supine to Sit with CGA from wife to maintain sternal precautions     Functional Mobility/Transfers:  · Patient completed Sit <> Stand Transfer with supervision with no assistive device from EOB  · Functional Mobility: ~450 ft in hallway with SBA no AD    Activities of Daily Living:  · LB Dressing: supervision to don socks   · Toileting: supervision      Patient left up in chair with all lines intact, call button in reach and wife present    [facility] 6 Click:  [facility] Total Score: 22    Treatment & Education:  Pt with understanding of sternal precautions and HEP to complete; has been completing ADLs without assistance and has no further acute OT needs  Education:    Assessment:     Rodolfo Messina is a 68 y.o. male with a medical diagnosis of S/P MVR (mitral valve replacement).  Pt has met OT goals and is safe to D/C home with HH and family assistance.    Plan:     ·   (D/C OT)   · Plan of Care Reviewed with: patient, spouse    This Plan of care has been discussed with the patient who was involved in its development and understands and is in agreement with the identified goals and treatment plan    GOALS:    Occupational Therapy Goals      Not on file          Multidisciplinary Problems (Resolved)        Problem: Occupational Therapy Goal    Goal Priority Disciplines Outcome Interventions   Occupational Therapy Goal   (Resolved)     OT, PT/OT Outcome(s) achieved    Description:  Updated Goals to be met by: 6/26/18     Patient will increase functional independence with ADLs by performing:    UE Dressing with Supervision.  LE Dressing with Supervision.  Grooming while standing at sink with Supervision.  Toileting from toilet with Supervision for hygiene and clothing management. -MET  Toilet transfer to toilet with Supervision.-MET  Functional mobility household distance with SBA. -MET    Updated Goals to be met by: 6/19/18     Patient will increase functional independence with ADLs by performing:    UE Dressing with Supervision.  LE Dressing with Supervision.  Grooming while standing at sink with Supervision.  Toileting from toilet with Supervision for hygiene and clothing management.   Toilet transfer to toilet with Supervision.  Functional mobility household distance with SBA.    Goals to be met by: 6/10/18     Patient will increase functional independence with ADLs by performing:    Feeding with Modified [place].  UE Dressing with Supervision.  LE Dressing with Supervision.  Grooming while standing at sink with Supervision.  Toileting from toilet with Supervision for hygiene and clothing management.   Toilet transfer to toilet with Supervision.                          Time Tracking:     OT Date of Treatment: 06/20/18  OT Start Time: 1142  OT Stop Time: 1152  OT Total Time (min): 10 min    Billable Minutes:Therapeutic Activity 10 minutes    MILTON Jasso  6/20/2018

## 2018-06-20 NOTE — TELEPHONE ENCOUNTER
----- Message from Andre Ashraf MD sent at 6/19/2018  5:41 PM CDT -----  Contact: Christine oh/ [facility]  tel:   [phone]  I would prefer he stay on the IVPB.  Please notify her.  Thanks.      ----- Message -----  From: Arline Portillo MA  Sent: 6/19/2018   4:40 PM  To: Andre Ashraf MD        ----- Message -----  From: Lauren Del Rosario  Sent: 6/19/2018   4:19 PM  To: [facility] Staff    Caller asking if pt. Can be discharged with 2 grams ROCEFIN IV push instead of IVPB?

## 2018-06-20 NOTE — PT/OT/SLP PROGRESS
Speech Language Pathology Treatment    Patient Name:  Rodolfo Messina   MRN:  [id number]  Admitting Diagnosis: S/P MVR (mitral valve replacement)    Recommendations:                 General Recommendations:  Dysphagia therapy  Diet recommendations:  NPO, Liquid Diet Level: NPO   Aspiration Precautions: Continue alternate means of nutrition and Puree for pleasure   General Precautions: Standard, aspiration, fall, sternal, NPO  Communication strategies:  provide increased time to answer and go to room if call light pushed    Subjective     Pt was awake and alert in NAD  Patient goals: to be able to eat     Pain/Comfort:  · Pain Rating 1: 0/10  · Pain Rating Post-Intervention 1: 0/10    Objective:     Has the patient been evaluated by SLP for swallowing?   Yes  Keep patient NPO? Yes   Current Respiratory Status: room air      Pt completed dysphagia ex;s x10 reps for tongue base retraction w/ glottal /g, k/, hyolarygneal elevation w/ falsetto /i/ and effortful swallows given min cues w/ fair ability.  He was unable to complete willie maneuver despite max cues.  He completed mendelsohn maneuver x5 reps given mod cues.  Pt completed CTAR x5.  He was offered and accepted po trials of honey thick liquids by tsp x4 and puree by tsp x2 using multiple swallows.  He tolerated all trials well w/ throat clearing on final honey thick liquid trial and on final puree trial. Pt cont to be easily fatigued as trials progress w/ difficulty w/ initiation of multiple swallows.  Education was provided to pt and spouse re: swallow ex;s, normal swallow mechanics, s/s of aspiration, risk of aspiration, liquid thickening, pleasure feeding recommendations, and aspiration precautions.  They indicated good understanding.    Assessment:     Rodolfo Messina is a 68 y.o. male with an SLP diagnosis of Dysphagia.  He presents with steady progress towards goals.    Goals:    SLP Goals        Problem: SLP Goal    Goal Priority Disciplines Outcome   SLP  Goal     SLP Ongoing (interventions implemented as appropriate)   Description:  Speech Language Pathology Goals  Goals expected to be met by 6/22/2018  1. Pt will to tolerate 1/2 tsp bites puree while completing 2-3 swallows per bolus w/o overt S/S aspiration, MIN A  2. Pt will tolerate trials of tsp sips honey thickened liquids while completing 2-3 swallows per bolus w/o overt S/S aspiration, MIN A   3. Educate Pt and family on safe swallow strategies and S/S aspiration   4. Continue to assess swallow to determine feasibility of re-initiation of pleasure feeds   5. Pt will complete dysphagia exercises given min A    Goals expected to be met by 6/11 -unable to meet prior to transfer to ICU  1. Pt will tolerate puree & honey thickened liquids for pleasure while completing 2-3 swallows per bolus w/o overt S/S aspiration, MIN A   3. Educate Pt and family on safe swallow strategies and S/S aspiration   4. Continue to assess swallow to determine feasibility of PO upgrade   5. Pt will complete dysphagia exercises given min A      Speech Language Pathology Goals  Goals expected to be met by 6/7:  1. Pt will tolerate trials of puree w/o overt S/S aspiration, MIN A - CONTINUE  2. Pt will tolerate trials of nectar-thickened liquids w/o overt S/S aspiration, MIN A - DISCONTINUE  3. Educate Pt and family on safe swallow strategies and S/S aspiration - CONTINUE  4. Continue to assess swallow to determine feasibility of PO upgrade - CONTINUE      Goals expected to be met by 6/4: goals not met 2/2 transfer to ICU 5/30  1. Pt will participate in ongoing swallowing assessment to determine safest and least restrictive diet.                            Plan:     · Patient to be seen:  5 x/week   · Plan of Care expires:  06/30/18  · Plan of Care reviewed with:  patient, spouse   · SLP Follow-Up:  Yes       Discharge recommendations:  home with home health   Barriers to Discharge:  None    Time Tracking:     SLP Treatment Date:    06/20/18  Speech Start Time:  1007  Speech Stop Time:  1036     Speech Total Time (min):  29 min    Billable Minutes: Treatment Swallowing Dysfunction 19 and Seld Care/Home Management Training 10    Leyda Chopra CCC-SLP  06/20/2018

## 2018-06-20 NOTE — PLAN OF CARE
Problem: Patient Care Overview  Goal: Plan of Care Review  Outcome: Ongoing (interventions implemented as appropriate)  Plan of care discussed with patient.  Patient ambulating with one person assist, fall precautions in place.Continuing to encourage IS and ambulation. Patient has complaint of pain to L leg but stated didn't want anything for it. Pt wife at bedside. Pt J- tube has continous tube feeding going at 50ml/hr, which is goal. Pt receiving ampicillin Q6h and rocephin Q12h. Pt given PRN cough medication due to uncontrollable coughing.  Discussed medications and care. Patient has no questions at this time. Will possibly D/c tomorrow.

## 2018-06-20 NOTE — PLAN OF CARE
Problem: Occupational Therapy Goal  Goal: Occupational Therapy Goal  Updated Goals to be met by: 6/26/18     Patient will increase functional independence with ADLs by performing:    UE Dressing with Supervision.  LE Dressing with Supervision.  Grooming while standing at sink with Supervision.  Toileting from toilet with Supervision for hygiene and clothing management. -MET  Toilet transfer to toilet with Supervision.-MET  Functional mobility household distance with SBA. -MET    Updated Goals to be met by: 6/19/18     Patient will increase functional independence with ADLs by performing:    UE Dressing with Supervision.  LE Dressing with Supervision.  Grooming while standing at sink with Supervision.  Toileting from toilet with Supervision for hygiene and clothing management.   Toilet transfer to toilet with Supervision.  Functional mobility household distance with SBA.    Goals to be met by: 6/10/18     Patient will increase functional independence with ADLs by performing:    Feeding with Modified [place].  UE Dressing with Supervision.  LE Dressing with Supervision.  Grooming while standing at sink with Supervision.  Toileting from toilet with Supervision for hygiene and clothing management.   Toilet transfer to toilet with Supervision.        Outcome: Outcome(s) achieved Date Met: 06/20/18  D/C OT 6/20/18

## 2018-06-20 NOTE — TELEPHONE ENCOUNTER
Left a voicemail  message for Christine letting her know that the pt should remain on IVPB. DILLON

## 2018-06-20 NOTE — PLAN OF CARE
Problem: Physical Therapy Goal  Goal: Physical Therapy Goal  Goals to be met by: 2018    Patient will increase functional independence with mobility by performin. Supine to sit with Supervision - not met  2. Sit to stand transfer with Supervision - not met  3. Bed to chair transfer with Supervision  - not met  4. Gait  x 250 feet with Supervision - not met  5. Lower extremity exercise program x20 reps per handout, with independence -  Not met       Outcome: Ongoing (interventions implemented as appropriate)  LTGs remain appropriate. Pt will continue PT POC.    Chad Mckeon, MICHAEL  2018

## 2018-06-20 NOTE — PLAN OF CARE
Problem: SLP Goal  Goal: SLP Goal  Speech Language Pathology Goals  Goals expected to be met by 6/22/2018  1. Pt will to tolerate 1/2 tsp bites puree while completing 2-3 swallows per bolus w/o overt S/S aspiration, MIN A  2. Pt will tolerate trials of tsp sips honey thickened liquids while completing 2-3 swallows per bolus w/o overt S/S aspiration, MIN A   3. Educate Pt and family on safe swallow strategies and S/S aspiration   4. Continue to assess swallow to determine feasibility of re-initiation of pleasure feeds   5. Pt will complete dysphagia exercises given min A    Goals expected to be met by 6/11 -unable to meet prior to transfer to ICU  1. Pt will tolerate puree & honey thickened liquids for pleasure while completing 2-3 swallows per bolus w/o overt S/S aspiration, MIN A   3. Educate Pt and family on safe swallow strategies and S/S aspiration   4. Continue to assess swallow to determine feasibility of PO upgrade   5. Pt will complete dysphagia exercises given min A      Speech Language Pathology Goals  Goals expected to be met by 6/7:  1. Pt will tolerate trials of puree w/o overt S/S aspiration, MIN A - CONTINUE  2. Pt will tolerate trials of nectar-thickened liquids w/o overt S/S aspiration, MIN A - DISCONTINUE  3. Educate Pt and family on safe swallow strategies and S/S aspiration - CONTINUE  4. Continue to assess swallow to determine feasibility of PO upgrade - CONTINUE      Goals expected to be met by 6/4: goals not met 2/2 transfer to ICU 5/30  1. Pt will participate in ongoing swallowing assessment to determine safest and least restrictive diet.           Outcome: Ongoing (interventions implemented as appropriate)  Pt participated well w/ tx tasks.  Goals remain appropriate.  Cont ST per POC.    Leyda Chopra CCC-SLP  6/20/2018

## 2018-06-20 NOTE — DISCHARGE SUMMARY
[facility]  Cardiothoracic Surgery  Discharge Summary      Patient Name: Rodolfo Messina  MRN: [id number]  Admission Date: 5/23/2018  Hospital Length of Stay: 28 days  Discharge Date and Time:  06/20/2018 2:55 PM  Attending Physician: Marvin Go MD   Discharging Provider: Asiya Fernandez NP  Primary Care Provider: Deric Claudio MD    HPI:   Mr. Messina is a 66yo M with a h/o mitral valve endocarditis and resultant severe MR, TR and pulmonary HTN.  The patient denies angina.  He denies exertional dyspnea, but reports he is sedentary.  He repots 1+ bilateral LE edema, but denies orthopnea or PND.  He denies palpitations, syncope, or near sycnope. The patient report that the etiology of his endocarditis remains unknown; however he is under the impression that it may be related to a urologic procedure. The pateint denies current faver/ chills. He is admitted to the SICU pre-operatively for IABP placement tomorrow and has MVR, TV repair, AVR and CABG planned for Friday 5/25.    Procedure(s) (LRB):  Mitral Valve Replacement (N/A)  AORTOCORONARY BYPASS-CABG (N/A)      Indwelling Lines/Drains at time of discharge:   Lines/Drains/Airways     Peripherally Inserted Central Catheter Line                 PICC Double Lumen 03/05/18 1539 right basilic 106 days         PICC Double Lumen 06/04/18 1654 left brachial 15 days          Central Venous Catheter Line                 Percutaneous Central Line Insertion/Assessment - Quad lumen  05/25/18 0800 left subclavian 26 days          Drain                 Gastrostomy/Enterostomy 06/07/18 1657 Gastrostomy-jejunostomy LUQ 12 days         Urethral Catheter 06/11/18 1711 Latex 8 days              Hospital Course: On 5/25/18, the patient was taken to the Operating Room for the above stated procedure. Please see the previously dictated operative report for complete details. Postoperatively, the patient was taken from the  Operating Room to the ICU where the vital  signs were monitored and pain was kept under control. The patient was weaned from the drips and extubated in the ICU per protocol. Once hemodynamically stable, the patient was transferred to the Cardiac Step-Down floor for continued strengthening and ambulation.  Patients hospital course was complicated by A flutter. On 5/28 SOWMYA for DCCV-thrombus was found- so no DCCV. Pt converted to NSR with medication. Pt also suffered with dysphagia- PEG tube placed for feeds on 6/7/18. Pt so has urinary retention. He will DC with a urinary catheter and f/u with them on 6/26. He had some nasal bleeding, ENT was consulted and that has resolved. ID consulted, recommend Ampicillin and Ceftriaxone x 6 weeks for Enterococcal SBE end date 7/13.  On postoperative day 28, the patient was ready for discharge to home with home christi PT/OT/ST. At the time of discharge, the patient was ambulating unassisted. Pain was well controlled with oral analgesics and the patient was tolerating the diet.     MOBILITY AND ACTIVITY: As tolerated. No heavy lifting of greater than 5 pounds and no driving.     DIET: An 1800-calorie ADA with a 1500 mL fluid restriction.     WOUND CARE INSTRUCTIONS: Check for redness, swelling and drainage around the  incision or wound. Patient is to call for any obvious bleeding, drainage, pus from the wound, unusual problems or difficulties or temperature of greater than 101   degrees.     FOLLOWUP: Follow up with Dr. Abbi PEREZ- will contact pt. Prior to this  appointment, the patient will have a chest x-ray and EKG.     Patient not placed on [employer] at the time of discharge due to ANNA    DISCHARGE CONDITION: At the time of discharge, the patient was in sinus rhythm and afebrile with stable vital signs.    Consults         Status Ordering Provider     Consult to Dietary  Once     Provider:  (Not yet assigned)    HAIM Tan     Consult to Endocrinology  Once     Provider:  (Not yet assigned)     Completed HAIM HARRIS     Inpatient consult to Electrophysiology  Once     Provider:  (Not yet assigned)    Completed LOULOU CUMMINGS SIRKATHY     Inpatient consult to Endocrinology  Once     Provider:  (Not yet assigned)    Completed TRACEY MARISCAL     Inpatient consult to ENT  Once     Provider:  (Not yet assigned)    Completed TRACEY MARISCAL     Inpatient consult to ENT  Once     Provider:  (Not yet assigned)    Completed NITHIN JOSE     Inpatient consult to Infectious Diseases  Once     Provider:  (Not yet assigned)    Completed MOREDELMAR QUEENE CROW SIRINA     Inpatient consult to Interventional Cardiology  Once     Provider:  (Not yet assigned)    Completed MORELLO RAULEE CROW SIRINA     Inpatient consult to Interventional Radiology  Once     Provider:  (Not yet assigned)    Completed MOREDELMAR QUEENE CROW SIRINA     Inpatient consult to Midline team  Once     Provider:  (Not yet assigned)    Completed KAILEY, RACHAEL     Inpatient consult to Physical Medicine Rehab  Once     Provider:  (Not yet assigned)    Completed TRACEY MARISCAL     [facility]  Once     Provider:  (Not yet assigned)    Completed MORERHYS QUEENULEE CROW SIRINA     Inpatient [facility]ICC team [facility]  Once     Provider:  (Not yet assigned)    Completed KAILEY, RACHAEL     Inpatient consult to Pulmonology  Once     Provider:  (Not yet assigned)    Completed NITHIN JOSE     Inpatient consult to Registered Dietitian/Nutritionist  Once     Provider:  (Not yet assigned)    Completed MORERHYS QUEENULEE CROW SIRINA     Inpatient consult to Registered Dietitian/Nutritionist  Once     Provider:  (Not yet assigned)    Completed MORERHYS QUEENULEE CROW SIRINA     Inpatient consult to [facility]  Once     Provider:  (Not yet assigned)    Completed KAILEY, RACHAEL          Significant Diagnostic Studies: Labs:   CMP   Recent Labs  Lab 06/19/18  0430 06/19/18  1257 06/20/18  0307   *  155*   155* 154*  154* 152*   K 4.1  4.1 4.2 3.9   *  114* 114* 114*   CO2 31*  31* 31* 30*   *  120* 141* 124*   BUN 87*  87* 81* 79*   CREATININE 2.7*  2.7* 2.5* 2.4*   CALCIUM 8.9  8.9 8.8 8.9   PROT 6.0  6.0 6.5 6.0   ALBUMIN 2.4*  2.4* 2.5* 2.4*   BILITOT 0.9  0.9 1.0 0.7   ALKPHOS 78  78 87 77   AST 21  21 24 24   ALT 15  15 17 18   ANIONGAP 10  10 9 8   ESTGFRAFRICA 26.8*  26.8* 29.4* 30.9*   EGFRNONAA 23.2*  23.2* 25.4* 26.7*    and CBC   Recent Labs  Lab 06/19/18  0430 06/19/18  1257 06/20/18  0307   WBC 6.66  6.66  6.66 8.16 7.56   HGB 7.2*  7.2*  7.2* 7.5* 7.0*   HCT 23.4*  23.4*  23.4* 25.3* 22.8*     152  152 156 149*       Pending Diagnostic Studies:     Procedure Component Value Units Date/Time    APTT [[id number]] Collected:  06/19/18 1837    Order Status:  Sent Lab Status:  In process Updated:  06/19/18 1837    Specimen:  Blood from Blood     APTT [[id number]] Collected:  06/13/18 2315    Order Status:  Sent Lab Status:  No result     Specimen:  Blood from Blood     Magnesium [[id number]] Collected:  06/15/18 1321    Order Status:  Sent Lab Status:  In process Updated:  06/15/18 1321    Specimen:  Blood from Blood     Potassium [[id number]] Collected:  06/15/18 1321    Order Status:  Sent Lab Status:  In process Updated:  06/15/18 1321    Specimen:  Blood from Blood     Potassium [[id number]] Collected:  05/28/18 0835    Order Status:  Sent Lab Status:  In process Updated:  05/28/18 0835    Specimen:  Blood from Blood     Potassium [[id number]] Collected:  05/25/18 1543    Order Status:  Sent Lab Status:  In process Updated:  05/25/18 1544    Specimen:  Blood from Blood         Final Active Diagnoses:    Diagnosis Date Noted POA    PRINCIPAL PROBLEM:  S/P MVR (mitral valve replacement) [Z95.2] 05/25/2018 Not Applicable    At high risk for altered skin integrity [Z91.89] 06/19/2018 Yes    Pulmonary infiltrates on CXR [R91.8] 06/16/2018 No    Impaired functional  mobility and endurance [Z74.09] 06/08/2018 Unknown    Dysphagia [R13.10] 06/08/2018 Unknown    On enteral nutrition [Z78.9] 06/04/2018 No    Epistaxis [R04.0] 05/29/2018 Unknown    S/P CABG x 1 [Z95.1] 05/25/2018 Not Applicable    Chronic systolic congestive heart failure [I50.22] 05/23/2018 Yes    Pulmonary hypertension due to mitral valve disease [I27.22, I05.9] 02/27/2018 Yes    Symptomatic anemia [D64.9] 02/22/2018 Yes    Pulmonary nodules [R91.8] 02/22/2018 Yes    ANNA (acute kidney injury) [N17.9] 02/22/2018 Yes    Anxiety [F41.9] 12/17/2015 Yes    Tremor [R25.1] 11/09/2014 Yes     Chronic    HTN (hypertension) [I10] 11/09/2014 Yes     Chronic      Problems Resolved During this Admission:    Diagnosis Date Noted Date Resolved POA    SOB (shortness of breath) [R06.02] 06/13/2018 06/20/2018 Yes    Sequelae of hyperalimentation [E68] 06/05/2018 06/11/2018 Unknown    Atrial flutter [I48.92] 05/29/2018 06/20/2018 Yes    Junctional bradycardia [R00.1] 05/28/2018 06/20/2018 No    Complete heart block [I44.2] 05/28/2018 06/20/2018 No    Acute hyperglycemia [R73.9] 05/25/2018 06/19/2018 No    Coronary artery disease of native artery of native heart with stable angina pectoris [I25.118] 05/22/2018 06/07/2018 Yes    Non-rheumatic mitral regurgitation [I34.0] 05/08/2018 05/25/2018 Yes    Endocarditis of mitral valve [I05.8] 02/28/2018 06/07/2018 Yes    Mitral and aortic valve regurgitation [I08.0] 02/27/2018 05/25/2018 Yes    Acute respiratory failure with hypoxia [J96.01] 02/24/2018 06/19/2018 Yes      Discharged Condition: fair    Disposition: home with HH    Follow Up:  Follow-up Information     [facility].    Specialties:  DME Provider, Home Health Services  Why:  Home Health: The nurse will see the patient tomorrow.  Contact information:  [address] [place] [address]  [place] [address]  [phone]             [facility].    Specialties:  Pharmacist, DME Provider, IV  Infusion  Why:  Infusion: They will provide the antibiotics and tube feeds.  They will deliver the supplies to the hospital prior to discharge.  Contact information:  [address] [place] [address]  [phone]             Call Marvin Go MD.    Specialties:  Cardiothoracic Surgery, Transplant  Contact information:  [address]  [facility] [address]  [phone]             SURI Guevara MD In 6 weeks.    Specialty:  Pulmonary Disease  Contact information:  [address] [facility] [address]  [phone]             [facility].    Specialty:  Otolaryngology  Why:  As needed  Contact information:  [address] [facility] [id number] [phone]               Patient Instructions:     Ambulatory referral to Anticoagulation Monitoring   Referral Priority: Routine Referral Type: Consultation   Referral Reason: Specialty Services Required    Requested Specialty: Cardiology    Number of Visits Requested: 1      Ambulatory Referral to Urology   Referral Priority: Routine Referral Type: Consultation   Referral Reason: Specialty Services Required    Requested Specialty: Urology    Number of Visits Requested: 1        Medications:  Reconciled Home Medications:      Medication List      START taking these medications    amiodarone 200 MG Tab  Commonly known as:  PACERONE  1 tablet (200 mg total) by Per G Tube route once daily.  Start taking on:  6/21/2018     amLODIPine 10 MG tablet  Commonly known as:  NORVASC  1 tablet (10 mg total) by Per G Tube route once daily.  Start taking on:  6/21/2018     AMPICILLIN 2 G/100 ML NS (READY TO MIX SYSTEM)  Inject 100 mLs (2 g total) into the vein every 6 (six) hours.     aspirin 81 MG Chew  4 tablets (324 mg total) by Per G Tube route once daily.  Start taking on:  6/21/2018  Replaces:  aspirin 81 MG EC tablet     cefTRIAXone 2 g in dextrose 5 % 50 mL 2 g/50 mL Pgbk IVPB  Commonly known as:  ROCEPHIN  Inject 50 mLs (2 g total) into the  vein every 12 (twelve) hours.     doxazosin 4 MG tablet  Commonly known as:  CARDURA  1 tablet (4 mg total) by Per G Tube route once daily.  Start taking on:  6/21/2018     hydrALAZINE 25 MG tablet  Commonly known as:  APRESOLINE  1 tablet (25 mg total) by Per G Tube route every 8 (eight) hours.     potassium chloride 10% 20 mEq/15 mL oral solution  Commonly known as:  KAYCIEL  15 mLs (20 mEq total) by Per G Tube route 2 (two) times daily.     warfarin 5 MG tablet  Commonly known as:  COUMADIN  1 tablet (5 mg total) by Per G Tube route Daily.        CHANGE how you take these medications    atorvastatin 40 MG tablet  Commonly known as:  LIPITOR  1 tablet (40 mg total) by Per G Tube route once daily.  Start taking on:  6/21/2018  What changed:  · medication strength  · how to take this        CONTINUE taking these medications    albuterol-ipratropium 2.5 mg-0.5 mg/3 mL nebulizer solution  Commonly known as:  DUO-NEB  3 mLs every 4 (four) hours as needed.     benzonatate 100 MG capsule  Commonly known as:  TESSALON  Take 100 mg by mouth 3 (three) times daily as needed for Cough.     furosemide 40 MG tablet  Commonly known as:  LASIX  Take 1 tablet (40 mg total) by mouth once daily.     loratadine 10 mg tablet  Commonly known as:  CLARITIN  Take 10 mg by mouth once daily.     mirtazapine 30 MG tablet  Commonly known as:  REMERON  Take 1 tablet (30 mg total) by mouth every evening.     pantoprazole 40 MG tablet  Commonly known as:  PROTONIX  Take 1 tablet (40 mg total) by mouth once daily.     VENTOLIN HFA 90 mcg/actuation inhaler  Generic drug:  albuterol        STOP taking these medications    aspirin 81 MG EC tablet  Commonly known as:  ECOTRIN  Replaced by:  aspirin 81 MG Chew     tamsulosin 0.4 mg Cp24  Commonly known as:  FLOMAX          Time spent on the discharge of patient: 35 minutes    Asiya Fernandez NP  Cardiothoracic Surgery  [facility]

## 2018-06-20 NOTE — PT/OT/SLP PROGRESS
Physical Therapy Treatment    Patient Name:  Rodolfo Messina   MRN:  [id number]    Recommendations:     Discharge Recommendations:  home with home health   Discharge Equipment Recommendations: none   Barriers to discharge: None    Assessment:     Rodolfo Messina is a 68 y.o. male admitted with a medical diagnosis of S/P MVR (mitral valve replacement).  He presents with the following impairments/functional limitations:  weakness, impaired self care skills, impaired functional mobilty, impaired cardiopulmonary response to activity. Pt tolerated PT well without adverse affect and is progressing appropriately as demonstrated by decreased levels of assistance needed for transfers. Pt remains appropriate for acute PT services.    Rehab Prognosis:  Good; patient would benefit from acute skilled PT services to address these deficits and reach maximum level of function.      Recent Surgery: Procedure(s) (LRB):  Mitral Valve Replacement (N/A)  AORTOCORONARY BYPASS-CABG (N/A) 26 Days Post-Op    Plan:     During this hospitalization, patient to be seen 5 x/week to address the above listed problems via gait training, therapeutic activities, therapeutic exercises, neuromuscular re-education  · Plan of Care Expires:  07/13/18   Plan of Care Reviewed with: patient, spouse    Subjective     Communicated with RN prior to session.  Patient found supine upon PT entry to room, agreeable to treatment.      Chief Complaint: Decreased endurance  Patient comments/goals: Return to PLOF  Pain/Comfort:  · Pain Rating 1: 2/10 (Pain in L LE)    Patients cultural, spiritual, Buddhist conflicts given the current situation: None    Objective:     Patient found with: telemetry, peripheral IV, lackey catheter, PEG Tube     General Precautions: Standard, aspiration, fall, sternal, NPO   Orthopedic Precautions:N/A   Braces: N/A     Functional Mobility:  · Bed Mobility:  · Supine > sit EOB- Shereen for trunk  · Pt given verbal cues for sternal  precautions  · Transfers:  · Sit <> stand to/from bed- 2 trials w/ SBA w/ no AD  · Gait:  · 450 ft w/ CGA and no AD  · Mild instability noted t/o gait cycle w/ no overt LOB      AM-PAC 6 CLICK MOBILITY  Turning over in bed (including adjusting bedclothes, sheets and blankets)?: 4  Sitting down on and standing up from a chair with arms (e.g., wheelchair, bedside commode, etc.): 3  Moving from lying on back to sitting on the side of the bed?: 3  Moving to and from a bed to a chair (including a wheelchair)?: 3  Need to walk in hospital room?: 3  Climbing 3-5 steps with a railing?: 2  Total Score: 18       Therapeutic Activities and Exercises:   Standing B hip abduction, B hip extension, and B heel raise- 2x10 reps. Pt held onto bed rail during therex for balance only.    Patient left up in chair with all lines intact and call button in reach..    GOALS:    Physical Therapy Goals        Problem: Physical Therapy Goal    Goal Priority Disciplines Outcome Goal Variances Interventions   Physical Therapy Goal     PT/OT, PT Ongoing (interventions implemented as appropriate)     Description:  Goals to be met by: 2018    Patient will increase functional independence with mobility by performin. Supine to sit with Supervision - not met  2. Sit to stand transfer with Supervision - not met  3. Bed to chair transfer with Supervision  - not met  4. Gait  x 250 feet with Supervision - not met  5. Lower extremity exercise program x20 reps per handout, with independence -  Not met                        Time Tracking:     PT Received On: 18  PT Start Time: 806     PT Stop Time: 834  PT Total Time (min): 28 min     Billable Minutes: Gait Training 18, Therapeutic Exercise 10 and Total Time 28    Treatment Type: Treatment  PT/PTA: PT     PTA Visit Number: 0     Chad Mckeon [facility]  2018

## 2018-06-20 NOTE — PLAN OF CARE
Pt did not dc yesterday. He will dc today with [facility].  SW informed them of the change.    Radha Montes, NELLY x [phone]

## 2018-06-21 ENCOUNTER — TELEPHONE (OUTPATIENT)
Dept: INFECTIOUS DISEASES | Facility: CLINIC | Age: 68
End: 2018-06-21

## 2018-06-21 ENCOUNTER — ANTI-COAG VISIT (OUTPATIENT)
Dept: CARDIOLOGY | Facility: CLINIC | Age: 68
End: 2018-06-21

## 2018-06-21 DIAGNOSIS — Z79.01 LONG TERM (CURRENT) USE OF ANTICOAGULANTS: ICD-10-CM

## 2018-06-21 DIAGNOSIS — Z95.2 S/P MVR (MITRAL VALVE REPLACEMENT): ICD-10-CM

## 2018-06-21 DIAGNOSIS — Z95.2 S/P MVR (MITRAL VALVE REPLACEMENT): Primary | ICD-10-CM

## 2018-06-21 NOTE — PROGRESS NOTES
" 66yo M with a h/o mitral valve endocarditis and resultant severe MR, TR and pulmonary HTN, hypertension, CHF, tremor, anxiey, ANNA, s/p CABG, s/p mitral valve replacement, epistaxis, on enteral nutrition. Patient was admitted to [facility] 5/23-6/20  for IABP placement  and had MVR, TV repair, AVR and CABG planned for Friday 5/25. Patient was discharged from [facility] on warfarin 5mg daily and has [facility].     Of note, this enrollment was sent to coumadin clinic by Asiya Fernandez NP.  I sent her the following message:  "I work in coumadin clinic and received the referral for monitoring from you on Rodolfo Messina [id number]. In order to complete the enrollment process I have a few questions. You entered an INR goal of 1.5-2.0. Is this correct despite him having a valve? Also, we will need to know which MD will be the responsible provider for signing off on our coumadin encounters. This will need to be an [facility] MD that follows patient on an outpatient basis. "  Awaiting response. CC'd other PharmDs since I will be out the next week    Per HH nurse, INR was ordered by MD for 6/22. I asked for AM STAT INR.     "

## 2018-06-21 NOTE — PT/OT/SLP DISCHARGE
Physical Therapy Discharge Summary    Name: Rodolfo Messina  MRN: [id number]   Principal Problem: S/P MVR (mitral valve replacement)     Patient Discharged from acute Physical Therapy on 18.  Please refer to prior PT noted date on 18 for functional status.     Assessment:     Goals partially met.    Objective:     GOALS:    Physical Therapy Goals     Not on file          Multidisciplinary Problems (Resolved)        Problem: Physical Therapy Goal    Goal Priority Disciplines Outcome Goal Variances Interventions   Physical Therapy Goal   (Resolved)     PT/OT, PT Outcome(s) achieved     Description:  Goals to be met by: 2018    Patient will increase functional independence with mobility by performin. Supine to sit with Supervision - not met  2. Sit to stand transfer with Supervision - not met  3. Bed to chair transfer with Supervision  - not met  4. Gait  x 250 feet with Supervision - not met  5. Lower extremity exercise program x20 reps per handout, with independence -  Not met                        Reasons for Discontinuation of Therapy Services  Transfer to alternate level of care.      Plan:     Patient Discharged to: Home with Home Health Service  Therapist of record not available to write discharge summary. .    Maria T Mazariegos, PT  2018

## 2018-06-21 NOTE — TELEPHONE ENCOUNTER
Spoke with [facility] pharmacy                                                                                                                                                                 and explained to them

## 2018-06-22 ENCOUNTER — PATIENT OUTREACH (OUTPATIENT)
Dept: ADMINISTRATIVE | Facility: CLINIC | Age: 68
End: 2018-06-22

## 2018-06-22 ENCOUNTER — TELEPHONE (OUTPATIENT)
Dept: CARDIOTHORACIC SURGERY | Facility: CLINIC | Age: 68
End: 2018-06-22

## 2018-06-22 ENCOUNTER — ANTI-COAG VISIT (OUTPATIENT)
Dept: CARDIOLOGY | Facility: CLINIC | Age: 68
End: 2018-06-22

## 2018-06-22 DIAGNOSIS — Z95.2 S/P MVR (MITRAL VALVE REPLACEMENT): ICD-10-CM

## 2018-06-22 DIAGNOSIS — Z79.01 LONG TERM (CURRENT) USE OF ANTICOAGULANTS: ICD-10-CM

## 2018-06-22 LAB
INR PPP: 1.5
PATHOLOGIST INTERPRETATION AB/XM: NORMAL

## 2018-06-22 NOTE — TELEPHONE ENCOUNTER
Carin from [facility] called to report a critical lab value. Pts  H &H is 6.7 and creatine is 2. Please advise.

## 2018-06-22 NOTE — PROGRESS NOTES
C3 nurse attempted to contact patient. The following occurred:   C3 nurse attempted to contact Rodolfo Messina  for a TCC post hospital discharge follow up call. The patient is unable to conduct the call @ this time. The patient requested a callback.    The patient has a scheduled POST OP appointment with Marvin Go on 7\9 @ 0945. Message sent to PCP staff for [facility]OS[facility]..

## 2018-06-22 NOTE — TELEPHONE ENCOUNTER
Spoke to his infusion company.    Assessment  1.  Anemia    Plan  1.  STAT CBC tomorrow.    2.  They were given my cell phone number to call in the results to me personally.  3.  Continue antibiotics.

## 2018-06-22 NOTE — PROGRESS NOTES
hosp D/c- 6/20, he had open heart surg, has peg tube, verified coumadin: 5mg daily, has 1 bruise since hosp stay, slight swelling in legs/ankles

## 2018-06-22 NOTE — PROGRESS NOTES
Called [facility] and was told that cardura was denied d\t showing pt on flomax. Instructed them not on flomax so reran and it is approved. They will call and let know done

## 2018-06-22 NOTE — TELEPHONE ENCOUNTER
HH RN from [facility] called with Critical Value of H&H today of 6.7 & 20.3, BUN 56, and Creatinine 2.0.  Per Asiya Fernandez NP, labs to be repeated on Monday 6/25/18.  HH RN notified and will repeat labs on Monday.  She verbalized understanding.

## 2018-06-23 ENCOUNTER — HOSPITAL ENCOUNTER (OUTPATIENT)
Facility: HOSPITAL | Age: 68
Discharge: HOME HEALTH | End: 2018-06-24
Attending: FAMILY MEDICINE | Admitting: HOSPITALIST
Payer: MEDICARE

## 2018-06-23 DIAGNOSIS — D64.9 ANEMIA, UNSPECIFIED TYPE: ICD-10-CM

## 2018-06-23 DIAGNOSIS — D64.9 SYMPTOMATIC ANEMIA: Primary | ICD-10-CM

## 2018-06-23 DIAGNOSIS — R06.02 SHORTNESS OF BREATH: ICD-10-CM

## 2018-06-23 DIAGNOSIS — R06.02 SOB (SHORTNESS OF BREATH): ICD-10-CM

## 2018-06-23 PROBLEM — R33.9 URINARY RETENTION: Status: ACTIVE | Noted: 2018-06-23

## 2018-06-23 PROBLEM — E87.0 HYPERNATREMIA: Status: ACTIVE | Noted: 2018-06-23

## 2018-06-23 PROBLEM — Z97.8 FOLEY CATHETER IN PLACE ON ADMISSION: Status: ACTIVE | Noted: 2018-06-23

## 2018-06-23 PROBLEM — R04.0 EPISTAXIS: Status: RESOLVED | Noted: 2018-05-29 | Resolved: 2018-06-23

## 2018-06-23 LAB
ABO + RH BLD: NORMAL
ALBUMIN SERPL BCP-MCNC: 2.3 G/DL
ALBUMIN SERPL BCP-MCNC: 3 G/DL
ALP SERPL-CCNC: 67 U/L
ALP SERPL-CCNC: 73 U/L
ALT SERPL W/O P-5'-P-CCNC: 36 U/L
ALT SERPL W/O P-5'-P-CCNC: 41 U/L
ANION GAP SERPL CALC-SCNC: 5 MMOL/L
ANION GAP SERPL CALC-SCNC: 6 MMOL/L
ANISOCYTOSIS BLD QL SMEAR: SLIGHT
AST SERPL-CCNC: 32 U/L
AST SERPL-CCNC: 40 U/L
BASOPHILS # BLD AUTO: 0.01 K/UL
BASOPHILS # BLD AUTO: 0.02 K/UL
BASOPHILS NFR BLD: 0.1 %
BASOPHILS NFR BLD: 0.2 %
BILIRUB SERPL-MCNC: 0.5 MG/DL
BILIRUB SERPL-MCNC: 0.6 MG/DL
BLD GP AB SCN CELLS X3 SERPL QL: NORMAL
BLD PROD TYP BPU: NORMAL
BLD PROD TYP BPU: NORMAL
BLOOD UNIT EXPIRATION DATE: NORMAL
BLOOD UNIT EXPIRATION DATE: NORMAL
BLOOD UNIT TYPE CODE: 6200
BLOOD UNIT TYPE CODE: 6200
BLOOD UNIT TYPE: NORMAL
BLOOD UNIT TYPE: NORMAL
BUN SERPL-MCNC: 54 MG/DL
BUN SERPL-MCNC: 55 MG/DL
CALCIUM SERPL-MCNC: 8.5 MG/DL
CALCIUM SERPL-MCNC: 8.8 MG/DL
CHLORIDE SERPL-SCNC: 121 MMOL/L
CHLORIDE SERPL-SCNC: 121 MMOL/L
CO2 SERPL-SCNC: 27 MMOL/L
CO2 SERPL-SCNC: 28 MMOL/L
CODING SYSTEM: NORMAL
CODING SYSTEM: NORMAL
CREAT SERPL-MCNC: 1.97 MG/DL
CREAT SERPL-MCNC: 2.1 MG/DL
DIFFERENTIAL METHOD: ABNORMAL
DIFFERENTIAL METHOD: ABNORMAL
DISPENSE STATUS: NORMAL
DISPENSE STATUS: NORMAL
EOSINOPHIL # BLD AUTO: 0.6 K/UL
EOSINOPHIL # BLD AUTO: 0.7 K/UL
EOSINOPHIL NFR BLD: 8.8 %
EOSINOPHIL NFR BLD: 8.9 %
ERYTHROCYTE [DISTWIDTH] IN BLOOD BY AUTOMATED COUNT: 15.9 %
ERYTHROCYTE [DISTWIDTH] IN BLOOD BY AUTOMATED COUNT: 16 %
EST. GFR  (AFRICAN AMERICAN): 36 ML/MIN/1.73 M^2
EST. GFR  (AFRICAN AMERICAN): 39.2 ML/MIN/1.73 M^2
EST. GFR  (NON AFRICAN AMERICAN): 31 ML/MIN/1.73 M^2
EST. GFR  (NON AFRICAN AMERICAN): 33.9 ML/MIN/1.73 M^2
GLUCOSE SERPL-MCNC: 100 MG/DL
GLUCOSE SERPL-MCNC: 129 MG/DL
HCT VFR BLD AUTO: 20.1 %
HCT VFR BLD AUTO: 22.3 %
HGB BLD-MCNC: 6 G/DL
HGB BLD-MCNC: 6.4 G/DL
HGB BLD-MCNC: 8.1 G/DL
HYPOCHROMIA BLD QL SMEAR: ABNORMAL
INR PPP: 1.9
LYMPHOCYTES # BLD AUTO: 0.7 K/UL
LYMPHOCYTES # BLD AUTO: 1 K/UL
LYMPHOCYTES NFR BLD: 12.1 %
LYMPHOCYTES NFR BLD: 9.5 %
MCH RBC QN AUTO: 29.1 PG
MCH RBC QN AUTO: 29.4 PG
MCHC RBC AUTO-ENTMCNC: 28.7 G/DL
MCHC RBC AUTO-ENTMCNC: 29.9 G/DL
MCV RBC AUTO: 101 FL
MCV RBC AUTO: 99 FL
MONOCYTES # BLD AUTO: 0.7 K/UL
MONOCYTES # BLD AUTO: 0.7 K/UL
MONOCYTES NFR BLD: 8.6 %
MONOCYTES NFR BLD: 9.5 %
NEUTROPHILS # BLD AUTO: 5.1 K/UL
NEUTROPHILS # BLD AUTO: 5.7 K/UL
NEUTROPHILS NFR BLD: 70.2 %
NEUTROPHILS NFR BLD: 72.1 %
NT-PROBNP: 4650 PG/ML
OVALOCYTES BLD QL SMEAR: ABNORMAL
PLATELET # BLD AUTO: 151 K/UL
PLATELET # BLD AUTO: 167 K/UL
PLATELET BLD QL SMEAR: ABNORMAL
PMV BLD AUTO: 11.4 FL
PMV BLD AUTO: 12.4 FL
POCT GLUCOSE: 125 MG/DL (ref 70–110)
POIKILOCYTOSIS BLD QL SMEAR: SLIGHT
POLYCHROMASIA BLD QL SMEAR: ABNORMAL
POTASSIUM SERPL-SCNC: 4.6 MMOL/L
POTASSIUM SERPL-SCNC: 4.6 MMOL/L
PROT SERPL-MCNC: 5.8 G/DL
PROT SERPL-MCNC: 6.3 G/DL
PROTHROMBIN TIME: 20.3 SEC
RBC # BLD AUTO: 2.04 M/UL
RBC # BLD AUTO: 2.2 M/UL
SODIUM SERPL-SCNC: 154 MMOL/L
SODIUM SERPL-SCNC: 154 MMOL/L
TRANS ERYTHROCYTES VOL PATIENT: NORMAL ML
TRANS ERYTHROCYTES VOL PATIENT: NORMAL ML
WBC # BLD AUTO: 7.13 K/UL
WBC # BLD AUTO: 8.13 K/UL

## 2018-06-23 PROCEDURE — 85025 COMPLETE CBC W/AUTO DIFF WBC: CPT

## 2018-06-23 PROCEDURE — 93010 ELECTROCARDIOGRAM REPORT: CPT | Mod: ,,, | Performed by: INTERNAL MEDICINE

## 2018-06-23 PROCEDURE — 27000221 HC OXYGEN, UP TO 24 HOURS

## 2018-06-23 PROCEDURE — 86850 RBC ANTIBODY SCREEN: CPT

## 2018-06-23 PROCEDURE — 80053 COMPREHEN METABOLIC PANEL: CPT | Mod: 91

## 2018-06-23 PROCEDURE — P9021 RED BLOOD CELLS UNIT: HCPCS

## 2018-06-23 PROCEDURE — 85025 COMPLETE CBC W/AUTO DIFF WBC: CPT | Mod: 91

## 2018-06-23 PROCEDURE — 83880 ASSAY OF NATRIURETIC PEPTIDE: CPT

## 2018-06-23 PROCEDURE — 94761 N-INVAS EAR/PLS OXIMETRY MLT: CPT

## 2018-06-23 PROCEDURE — 86922 COMPATIBILITY TEST ANTIGLOB: CPT

## 2018-06-23 PROCEDURE — 25000003 PHARM REV CODE 250: Performed by: NURSE PRACTITIONER

## 2018-06-23 PROCEDURE — 85018 HEMOGLOBIN: CPT

## 2018-06-23 PROCEDURE — 36415 COLL VENOUS BLD VENIPUNCTURE: CPT

## 2018-06-23 PROCEDURE — 25000003 PHARM REV CODE 250: Performed by: HOSPITALIST

## 2018-06-23 PROCEDURE — G0378 HOSPITAL OBSERVATION PER HR: HCPCS

## 2018-06-23 PROCEDURE — 80053 COMPREHEN METABOLIC PANEL: CPT

## 2018-06-23 PROCEDURE — 99285 EMERGENCY DEPT VISIT HI MDM: CPT | Mod: 25

## 2018-06-23 PROCEDURE — 94760 N-INVAS EAR/PLS OXIMETRY 1: CPT

## 2018-06-23 PROCEDURE — 93005 ELECTROCARDIOGRAM TRACING: CPT

## 2018-06-23 PROCEDURE — 96374 THER/PROPH/DIAG INJ IV PUSH: CPT

## 2018-06-23 PROCEDURE — 85610 PROTHROMBIN TIME: CPT

## 2018-06-23 PROCEDURE — 63600175 PHARM REV CODE 636 W HCPCS: Performed by: FAMILY MEDICINE

## 2018-06-23 PROCEDURE — 63600175 PHARM REV CODE 636 W HCPCS: Performed by: HOSPITALIST

## 2018-06-23 PROCEDURE — 63600175 PHARM REV CODE 636 W HCPCS: Performed by: NURSE PRACTITIONER

## 2018-06-23 RX ORDER — ATORVASTATIN CALCIUM 40 MG/1
40 TABLET, FILM COATED ORAL DAILY
Status: DISCONTINUED | OUTPATIENT
Start: 2018-06-23 | End: 2018-06-24 | Stop reason: HOSPADM

## 2018-06-23 RX ORDER — IPRATROPIUM BROMIDE AND ALBUTEROL SULFATE 2.5; .5 MG/3ML; MG/3ML
3 SOLUTION RESPIRATORY (INHALATION) EVERY 4 HOURS PRN
Status: DISCONTINUED | OUTPATIENT
Start: 2018-06-23 | End: 2018-06-24 | Stop reason: HOSPADM

## 2018-06-23 RX ORDER — DOXAZOSIN 2 MG/1
4 TABLET ORAL DAILY
Status: DISCONTINUED | OUTPATIENT
Start: 2018-06-23 | End: 2018-06-24 | Stop reason: HOSPADM

## 2018-06-23 RX ORDER — SODIUM CHLORIDE 0.9 % (FLUSH) 0.9 %
5 SYRINGE (ML) INJECTION
Status: DISCONTINUED | OUTPATIENT
Start: 2018-06-23 | End: 2018-06-24 | Stop reason: HOSPADM

## 2018-06-23 RX ORDER — CETIRIZINE HYDROCHLORIDE 10 MG/1
10 TABLET ORAL DAILY
Status: DISCONTINUED | OUTPATIENT
Start: 2018-06-23 | End: 2018-06-24 | Stop reason: HOSPADM

## 2018-06-23 RX ORDER — ACETAMINOPHEN 325 MG/1
650 TABLET ORAL EVERY 4 HOURS PRN
Status: DISCONTINUED | OUTPATIENT
Start: 2018-06-23 | End: 2018-06-24 | Stop reason: HOSPADM

## 2018-06-23 RX ORDER — AMIODARONE HYDROCHLORIDE 200 MG/1
200 TABLET ORAL DAILY
Status: DISCONTINUED | OUTPATIENT
Start: 2018-06-23 | End: 2018-06-24 | Stop reason: HOSPADM

## 2018-06-23 RX ORDER — PANTOPRAZOLE SODIUM 40 MG/1
40 TABLET, DELAYED RELEASE ORAL DAILY
Status: DISCONTINUED | OUTPATIENT
Start: 2018-06-23 | End: 2018-06-24 | Stop reason: HOSPADM

## 2018-06-23 RX ORDER — FUROSEMIDE 10 MG/ML
40 INJECTION INTRAMUSCULAR; INTRAVENOUS ONCE
Status: COMPLETED | OUTPATIENT
Start: 2018-06-23 | End: 2018-06-23

## 2018-06-23 RX ORDER — WARFARIN SODIUM 5 MG/1
5 TABLET ORAL DAILY
Status: DISCONTINUED | OUTPATIENT
Start: 2018-06-23 | End: 2018-06-24 | Stop reason: HOSPADM

## 2018-06-23 RX ORDER — PROCHLORPERAZINE EDISYLATE 5 MG/ML
5 INJECTION INTRAMUSCULAR; INTRAVENOUS EVERY 6 HOURS PRN
Status: DISCONTINUED | OUTPATIENT
Start: 2018-06-23 | End: 2018-06-24 | Stop reason: HOSPADM

## 2018-06-23 RX ORDER — MIRTAZAPINE 15 MG/1
30 TABLET, FILM COATED ORAL NIGHTLY
Status: DISCONTINUED | OUTPATIENT
Start: 2018-06-23 | End: 2018-06-24 | Stop reason: HOSPADM

## 2018-06-23 RX ORDER — HYDROCODONE BITARTRATE AND ACETAMINOPHEN 500; 5 MG/1; MG/1
TABLET ORAL
Status: DISCONTINUED | OUTPATIENT
Start: 2018-06-23 | End: 2018-06-24 | Stop reason: HOSPADM

## 2018-06-23 RX ORDER — GUAIFENESIN/DEXTROMETHORPHAN 100-10MG/5
10 SYRUP ORAL EVERY 6 HOURS PRN
Status: DISCONTINUED | OUTPATIENT
Start: 2018-06-24 | End: 2018-06-24 | Stop reason: HOSPADM

## 2018-06-23 RX ORDER — NAPROXEN SODIUM 220 MG/1
324 TABLET, FILM COATED ORAL DAILY
Status: DISCONTINUED | OUTPATIENT
Start: 2018-06-23 | End: 2018-06-24 | Stop reason: HOSPADM

## 2018-06-23 RX ORDER — BENZONATATE 100 MG/1
100 CAPSULE ORAL 3 TIMES DAILY PRN
Status: DISCONTINUED | OUTPATIENT
Start: 2018-06-23 | End: 2018-06-24 | Stop reason: HOSPADM

## 2018-06-23 RX ORDER — POTASSIUM CHLORIDE 20 MEQ/15ML
20 SOLUTION ORAL 2 TIMES DAILY
Status: DISCONTINUED | OUTPATIENT
Start: 2018-06-23 | End: 2018-06-24 | Stop reason: HOSPADM

## 2018-06-23 RX ORDER — DIPHENHYDRAMINE HYDROCHLORIDE 50 MG/ML
25 INJECTION INTRAMUSCULAR; INTRAVENOUS EVERY 6 HOURS PRN
Status: DISCONTINUED | OUTPATIENT
Start: 2018-06-23 | End: 2018-06-24 | Stop reason: HOSPADM

## 2018-06-23 RX ORDER — FUROSEMIDE 10 MG/ML
40 INJECTION INTRAMUSCULAR; INTRAVENOUS
Status: COMPLETED | OUTPATIENT
Start: 2018-06-23 | End: 2018-06-23

## 2018-06-23 RX ORDER — AMLODIPINE BESYLATE 5 MG/1
10 TABLET ORAL DAILY
Status: DISCONTINUED | OUTPATIENT
Start: 2018-06-23 | End: 2018-06-24 | Stop reason: HOSPADM

## 2018-06-23 RX ORDER — ONDANSETRON 2 MG/ML
4 INJECTION INTRAMUSCULAR; INTRAVENOUS EVERY 8 HOURS PRN
Status: DISCONTINUED | OUTPATIENT
Start: 2018-06-23 | End: 2018-06-24 | Stop reason: HOSPADM

## 2018-06-23 RX ORDER — HYDRALAZINE HYDROCHLORIDE 25 MG/1
25 TABLET, FILM COATED ORAL EVERY 8 HOURS
Status: DISCONTINUED | OUTPATIENT
Start: 2018-06-23 | End: 2018-06-24 | Stop reason: HOSPADM

## 2018-06-23 RX ADMIN — MIRTAZAPINE 30 MG: 15 TABLET, FILM COATED ORAL at 09:06

## 2018-06-23 RX ADMIN — FUROSEMIDE 40 MG: 10 INJECTION, SOLUTION INTRAMUSCULAR; INTRAVENOUS at 01:06

## 2018-06-23 RX ADMIN — ASPIRIN 81 MG 324 MG: 81 TABLET ORAL at 08:06

## 2018-06-23 RX ADMIN — AMPICILLIN 8 G: 500 INJECTION, POWDER, FOR SOLUTION INTRAMUSCULAR; INTRAVENOUS at 10:06

## 2018-06-23 RX ADMIN — CEFTRIAXONE SODIUM 2 G: 2 INJECTION, POWDER, FOR SOLUTION INTRAMUSCULAR; INTRAVENOUS at 08:06

## 2018-06-23 RX ADMIN — ATORVASTATIN CALCIUM 40 MG: 40 TABLET, FILM COATED ORAL at 08:06

## 2018-06-23 RX ADMIN — FUROSEMIDE 40 MG: 10 INJECTION, SOLUTION INTRAMUSCULAR; INTRAVENOUS at 12:06

## 2018-06-23 RX ADMIN — AMLODIPINE BESYLATE 10 MG: 5 TABLET ORAL at 08:06

## 2018-06-23 RX ADMIN — BENZONATATE 100 MG: 100 CAPSULE ORAL at 10:06

## 2018-06-23 RX ADMIN — AMIODARONE HYDROCHLORIDE 200 MG: 200 TABLET ORAL at 08:06

## 2018-06-23 RX ADMIN — CETIRIZINE HYDROCHLORIDE 10 MG: 10 TABLET, FILM COATED ORAL at 08:06

## 2018-06-23 RX ADMIN — WARFARIN SODIUM 5 MG: 5 TABLET ORAL at 04:06

## 2018-06-23 RX ADMIN — PANTOPRAZOLE SODIUM 40 MG: 40 TABLET, DELAYED RELEASE ORAL at 08:06

## 2018-06-23 RX ADMIN — HYDRALAZINE HYDROCHLORIDE 25 MG: 25 TABLET, FILM COATED ORAL at 09:06

## 2018-06-23 RX ADMIN — DOXAZOSIN MESYLATE 4 MG: 2 TABLET ORAL at 08:06

## 2018-06-23 RX ADMIN — POTASSIUM CHLORIDE 20 MEQ: 20 SOLUTION ORAL at 09:06

## 2018-06-23 RX ADMIN — GUAIFENESIN AND DEXTROMETHORPHAN 10 ML: 100; 10 SYRUP ORAL at 11:06

## 2018-06-23 RX ADMIN — POTASSIUM CHLORIDE 20 MEQ: 20 SOLUTION ORAL at 08:06

## 2018-06-23 RX ADMIN — CEFTRIAXONE SODIUM 2 G: 2 INJECTION, POWDER, FOR SOLUTION INTRAMUSCULAR; INTRAVENOUS at 09:06

## 2018-06-23 RX ADMIN — HYDRALAZINE HYDROCHLORIDE 25 MG: 25 TABLET, FILM COATED ORAL at 01:06

## 2018-06-23 NOTE — ASSESSMENT & PLAN NOTE
Hgb 6.4, Hct 22.3.  He had 3 units RBCs post CABG.  He had GI workup on 2/1/18 with EGD showed Non-bleeding erosive gastropathy and hiatal hernia, and Colonoscopy showing internal hemorrhoids and non-bleeding diverticolosis, otherwise normal. There is an appointment for Video Endoscopy on 3/29/18.    --Transfuse 2 units RBCs

## 2018-06-23 NOTE — PLAN OF CARE
06/23/18 0725   PRE-TX-O2-ETCO2   O2 Device (Oxygen Therapy) nasal cannula   $ Is the patient on Low Flow Oxygen? Yes   Flow (L/min) 2   SpO2 (!) 94 %   $ Pulse Oximetry - Multiple Charge Pulse Oximetry - Multiple

## 2018-06-23 NOTE — ASSESSMENT & PLAN NOTE
Lainez catheter in place on admission  Has follow up with Urology, Delym Gomes, NP.  Routine Lainez Care.  Continue home doxazosin 4 mg daily

## 2018-06-23 NOTE — PLAN OF CARE
06/23/18 1320   Discharge Assessment   Assessment Type Discharge Planning Assessment   Assessment information obtained from? Patient   Prior to hospitilization cognitive status: Alert/Oriented   Prior to hospitalization functional status: Assistive Equipment;Needs Assistance   Current cognitive status: Alert/Oriented   Current Functional Status: Assistive Equipment;Needs Assistance   Facility Arrived From: [facility] [place]    Lives With grandchild(devon);spouse   Able to Return to Prior Arrangements yes   Is patient able to care for self after discharge? No   Who are your caregiver(s) and their phone number(s)? Lupe Messina(wife)[phone]   Readmission Within The Last 30 Days (Pt was d/c'd from [facility] 6-20-18,currently in OBS)   Patient currently being followed by outpatient case management? No   Patient currently receives any other outside agency services? No   Equipment Currently Used at Home bath bench   Do you have any problems affording any of your prescribed medications? No   Is the patient taking medications as prescribed? yes   Does the patient have transportation home? Yes   Transportation Available family or friend will provide;car   Does the patient receive services at the Coumadin Clinic? No   Discharge Plan A Home Health  (The pt's currently with [facility])   Discharge Plan B Home Health   Patient/Family In Agreement With Plan yes   The  met with the pt to complete the d/c assessment. The pt was transferred from [facility] in [place] with complaints of SOB and coughing. The pt was recently discharged from [facility] 6-20-18 for heart surgery(CABG). The pt lives at home with his wife and his 30 y o grandson who assist him a lot with his adl's as needed. The pt has a bath bench at home. The pt currently has [facility] and [facility]. The pt's on Amoxicillin iv. The pt has a peg tube also. The pt had [facility] in the  past and his wife doesn't want to use them again.

## 2018-06-23 NOTE — ASSESSMENT & PLAN NOTE
Hypernatremia  BUN 54 (down from 79), Creatinine 1.97 (down from 2.4) S/P Thoracic surgery. Resolving.    Sodium 154 ~~baseline since surgery  Strict Intake and Output, Avoid nephrotoxins, and renally dose medications.

## 2018-06-23 NOTE — ED NOTES
Patient has continuous infusion Amoxicillin infusing through home pump. Patient also has tube 50ml/hr

## 2018-06-23 NOTE — NURSING
Patient arrived to unit. Tube feeding infusing at 50 cc/hr via personal pump. IV antibiotics infusing via personal pump. Patient has a peg to LUQ clean dry and intact. He also has a PICC with dressing dated for 6/20 clean dry and intact. EDUARDA Renteria notified of patient's arrival.

## 2018-06-23 NOTE — ED PROVIDER NOTES
Encounter Date: 6/23/2018       History     Chief Complaint   Patient presents with    Shortness of Breath     Pt DC'd from [facility] 3 days ago post CABG. Pt C/O SOB this PM with a cough. New edema noted to BLE this PM per wife. PICC and PEG placement during admission.  Cont Ampicillin infusion to PICC, cont feed to PEG.      Patient discharged 3 days ago from our [facility] by Dr. Brandi vargas the cardiologist.   call the patient today stating that his hemoglobin was low and to go to the hospital.  Apparently patient had bypass surgery and was in the hospital for 1 month and his hemoglobin came back low resulting in patient's been told to come to the patient is symptomatic with shortness of breath and orthopnea no cough no congestion no fever          Review of patient's allergies indicates:   Allergen Reactions    Shellfish containing products     Augmentin [amoxicillin-pot clavulanate]      Patient felt that it raised BP and requested to have it added as intolerance. Tolerated unasyn and ampicillin.    Ciprofloxacin     Flagyl [metronidazole]     Lisinopril Other (See Comments)     cough    Mysoline [primidone]     Omnicef [cefdinir]      Past Medical History:   Diagnosis Date    ANNA (acute kidney injury) 2/22/2018    Anemia     Anxiety     Arthritis     BPH (benign prostatic hyperplasia)     CHF (congestive heart failure) 5/23/2018    Coronary artery disease of native artery of native heart with stable angina pectoris 5/21/2018    Diverticulosis     Encounter for blood transfusion     Hypertension     Urinary retention      Past Surgical History:   Procedure Laterality Date    APPENDECTOMY      COLONOSCOPY      COLONOSCOPY N/A 2/1/2018    Procedure: COLONOSCOPY;  Surgeon: Richar Payan MD;  Location: [address]);  Service: Endoscopy;  Laterality: N/A;  PM prep    CREATION OF AORTOCORONARY ARTERY BYPASS N/A 5/25/2018    Procedure: AORTOCORONARY BYPASS-CABG;  Surgeon: Marvin  MD Abbi;  Location: [address];  Service: Cardiovascular;  Laterality: N/A;    EYE SURGERY Right     cataract extraction    FINGER SURGERY      FRACTURE SURGERY Right     index finger    MITRAL VALVE REPLACEMENT N/A 5/25/2018    Procedure: Mitral Valve Replacement;  Surgeon: Marvin Go MD;  Location: [address];  Service: Cardiovascular;  Laterality: N/A;    TONSILLECTOMY       Family History   Problem Relation Age of Onset    Stroke Mother     Heart disease Mother         CABG    Heart disease Father         CABG    No Known Problems Sister     No Known Problems Brother      Social History   Substance Use Topics    Smoking status: Never Smoker    Smokeless tobacco: Never Used    Alcohol use No      Comment: none recently     Review of Systems   Constitutional: Negative for fever.   HENT: Negative for sore throat.    Respiratory: Positive for shortness of breath.    Cardiovascular: Negative for chest pain.   Gastrointestinal: Negative for nausea.   Genitourinary: Negative for dysuria.   Musculoskeletal: Negative for back pain.   Skin: Negative for rash.   Neurological: Negative for weakness.   Hematological: Does not bruise/bleed easily.   All other systems reviewed and are negative.      Physical Exam     Initial Vitals   BP Pulse Resp Temp SpO2   06/23/18 0038 06/23/18 0037 06/23/18 0037 06/23/18 0038 06/23/18 0037   (!) 116/58 97 (!) 22 98.5 °F (36.9 °C) (!) 94 %      MAP       --                Physical Exam    Constitutional: He appears well-developed and well-nourished.   HENT:   Head: Normocephalic and atraumatic.   Eyes: Conjunctivae and EOM are normal. Pupils are equal, round, and reactive to light.   Neck: Normal range of motion. Neck supple.   Cardiovascular: Normal rate, regular rhythm and normal heart sounds.   Pulmonary/Chest: Breath sounds normal.   Abdominal: Soft. Bowel sounds are normal.   Musculoskeletal: Normal range of motion.   Neurological: He is alert. He has  normal reflexes.   Skin:   Several incisions with sutures incisions are dry clean no signs of discharge or infection         ED Course   Procedures  Labs Reviewed   CBC W/ AUTO DIFFERENTIAL   COMPREHENSIVE METABOLIC PANEL   B-TYPE NATRIURETIC PEPTIDE   PROTIME-INR     EKG Readings: (Independently Interpreted)   EKG shows normal sinus rhythm a rate of 97 normal conduction no ectopy no signs of STEMI       Imaging Results    None       X-Rays:   Independently Interpreted Readings:   Other Readings:  Xray reviewed by myself and is negative. Awaiting radiology report.      Medical Decision Making:   Initial Assessment:   Patient sitting in no distress and pleasant. Patient has no other complaints other than documented.     Differential Diagnosis:   Angina, unstable angina, hypertension, hypertension urgency, hypertension emergency, myocardial infarction                        Clinical Impression:   The primary encounter diagnosis was Anemia, unspecified type. Diagnoses of SOB (shortness of breath) and Shortness of breath were also pertinent to this visit.                             Yves Luna MD  06/23/18 0144

## 2018-06-23 NOTE — H&P
[facility]  History & Physical    Patient Name: Rodolfo Messina  MRN: [id number]  Admission Date: 6/23/2018  Attending Physician: No att. providers found   Primary Care Provider: Deric Claudio MD         Patient information was obtained from patient, spouse/SO, past medical records and ER records.     Subjective:     Principal Problem:Symptomatic anemia    Chief Complaint:   Chief Complaint   Patient presents with    Shortness of Breath     Pt DC'd from [facility] [place] 3 days ago post CABG. Pt C/O SOB this PM with a cough. New edema noted to BLE this PM per wife. PICC and PEG placement during admission.  Cont Ampicillin infusion to PICC, cont feed to PEG.         HPI: Mr. Messina is a 68yo M with a Anemia,Hypertension, Anxiety, Systolic Heart Failure, Pulmonary Hypertension, h/o mitral valve endocarditis and resultant severe MR, TR and pulmonary HTN. Etiology of Endcarditis is unknown.   He is s/p bioprosthetic mitral valve replacement and CABG x1 on 05/25/2018 at [facility].  His hospital course was complicated by Atrial Flutter, Cardioverted back to Sinus Rhythm; Dysphagia with PEG tube placed on 6/26/18; Urinary Retention, discharged with lackey with urology f/u on 6/26/18; and Epistaxis requiring ENT consult which resolved.  He was placed on six weeks of Ampicillin & Ceftriaxone for Enterococcal SBE (End Date 7/13/18.  He was discharged home with home health on 6/20/18.     He presented to [facility] ED as instructed by his Infectious Disease MD, Dr. Andre Ashraf, for a low blood count (Hgb 6.7, Hct 20.3).  His Hgb/Hct at discharge on 6/20/18 was 7.0/22.3 respectively. Labs drawn in the ED: Hgb 6.4, hct 22.3. He is also complaining of shortness of breath.  His sodium is 154, which appears to be his baseline since is surgery. His Pro-BNP is elevated at 4650, but his chest X-Ray does look significantly volume overloaded, but he did have pedal edema. He was given furosemide  40 mg IV in the ED.  He is admitted observation status to [facility] for Symptomatic Anemia requiring blood transfusion.       Past Medical History:   Diagnosis Date    ANNA (acute kidney injury) 2/22/2018    Anemia     Anxiety     Arthritis     BPH (benign prostatic hyperplasia)     CHF (congestive heart failure) 5/23/2018    Coarse tremors     Coronary artery disease of native artery of native heart with stable angina pectoris 5/21/2018    Diverticulosis     Encounter for blood transfusion     Endocarditis     Hypertension     Urinary retention        Past Surgical History:   Procedure Laterality Date    APPENDECTOMY      COLONOSCOPY      COLONOSCOPY N/A 2/1/2018    Procedure: COLONOSCOPY;  Surgeon: Richar Payan MD;  Location: [facility] (4TH FLR);  Service: Endoscopy;  Laterality: N/A;  PM prep    CREATION OF AORTOCORONARY ARTERY BYPASS N/A 5/25/2018    Procedure: AORTOCORONARY BYPASS-CABG;  Surgeon: Marvin Go MD;  Location: [facility] OR [address] [place] [address];  Service: Cardiovascular;  Laterality: N/A;    EYE SURGERY Right     cataract extraction    FINGER SURGERY      FRACTURE SURGERY Right     index finger    MITRAL VALVE REPLACEMENT N/A 5/25/2018    Procedure: Mitral Valve Replacement;  Surgeon: Marvin Go MD;  Location: [facility] OR [address] [place] [address];  Service: Cardiovascular;  Laterality: N/A;    TONSILLECTOMY         Review of patient's allergies indicates:   Allergen Reactions    Shellfish containing products     Augmentin [amoxicillin-pot clavulanate]      Patient felt that it raised BP and requested to have it added as intolerance. Tolerated unasyn and ampicillin.    Ciprofloxacin     Flagyl [metronidazole]     Lisinopril Other (See Comments)     cough    Mysoline [primidone]     Omnicef [cefdinir]        No current facility-administered medications on file prior to encounter.      Current Outpatient Prescriptions on File Prior to Encounter   Medication Sig    amiodarone  (PACERONE) 200 MG Tab 1 tablet (200 mg total) by Per G Tube route once daily.    amLODIPine (NORVASC) 10 MG tablet 1 tablet (10 mg total) by Per G Tube route once daily.    ampicillin sodium (AMPICILLIN 2 G/100 ML NS, READY TO MIX SYSTEM,) Inject 100 mLs (2 g total) into the vein every 6 (six) hours. (Patient taking differently: Inject 8 g into the vein continuous. )    aspirin 81 MG Chew 4 tablets (324 mg total) by Per G Tube route once daily.    atorvastatin (LIPITOR) 40 MG tablet 1 tablet (40 mg total) by Per G Tube route once daily.    cefTRIAXone 2 g in dextrose 5 % 50 mL (ROCEPHIN) 2 g/50 mL PgBk IVPB Inject 50 mLs (2 g total) into the vein every 12 (twelve) hours.    doxazosin (CARDURA) 4 MG tablet 1 tablet (4 mg total) by Per G Tube route once daily.    furosemide (LASIX) 40 MG tablet Take 1 tablet (40 mg total) by mouth once daily.    hydrALAZINE (APRESOLINE) 25 MG tablet 1 tablet (25 mg total) by Per G Tube route every 8 (eight) hours.    loratadine (CLARITIN) 10 mg tablet Take 10 mg by mouth once daily.    mirtazapine (REMERON) 30 MG tablet Take 1 tablet (30 mg total) by mouth every evening.    pantoprazole (PROTONIX) 40 MG tablet Take 1 tablet (40 mg total) by mouth once daily.    potassium chloride 10% (KAYCIEL) 20 mEq/15 mL oral solution 15 mLs (20 mEq total) by Per G Tube route 2 (two) times daily.    warfarin (COUMADIN) 5 MG tablet 1 tablet (5 mg total) by Per G Tube route Daily.    albuterol-ipratropium 2.5mg-0.5mg/3mL (DUO-NEB) 0.5 mg-3 mg(2.5 mg base)/3 mL nebulizer solution 3 mLs every 4 (four) hours as needed.     benzonatate (TESSALON) 100 MG capsule Take 100 mg by mouth 3 (three) times daily as needed for Cough.    VENTOLIN HFA 90 mcg/actuation inhaler      Family History     Problem Relation (Age of Onset)    Heart disease Mother, Father    No Known Problems Sister, Brother    Stroke Mother        Social History Main Topics    Smoking status: Never Smoker    Smokeless  tobacco: Never Used    Alcohol use No      Comment: none recently    Drug use: No    Sexual activity: Not Currently     Partners: Female     Review of Systems   Constitutional: Negative for chills, diaphoresis, fever and unexpected weight change.   HENT: Positive for trouble swallowing ( PEG tube d/t dysphagia). Negative for congestion, sore throat and voice change.    Eyes: Negative for photophobia and visual disturbance.   Respiratory: Positive for cough and shortness of breath. Negative for wheezing.    Cardiovascular: Positive for leg swelling (Ankles, Improved after IV Laix in the ED). Negative for chest pain and palpitations.   Gastrointestinal: Negative for abdominal distention, abdominal pain, blood in stool, constipation, diarrhea, nausea and vomiting.   Endocrine: Negative for polydipsia, polyphagia and polyuria.   Genitourinary: Positive for difficulty urinating (Lainez d/t Uirnary Retention). Negative for decreased urine volume, hematuria and urgency.   Musculoskeletal: Negative for back pain, joint swelling, neck pain and neck stiffness.   Skin: Negative for color change, rash and wound.   Neurological: Positive for weakness. Negative for speech difficulty and headaches.   Psychiatric/Behavioral: Negative for agitation, decreased concentration and sleep disturbance. The patient is not nervous/anxious.      Objective:     Vital Signs (Most Recent):  Temp: 98.7 °F (37.1 °C) (06/23/18 0505)  Pulse: 92 (06/23/18 0505)  Resp: 20 (06/23/18 0505)  BP: (!) 115/57 (06/23/18 0505)  SpO2: 96 % (06/23/18 0038) Vital Signs (24h Range):  Temp:  [98.5 °F (36.9 °C)-98.7 °F (37.1 °C)] 98.7 °F (37.1 °C)  Pulse:  [92-97] 92  Resp:  [20-24] 20  SpO2:  [94 %-96 %] 96 %  BP: (115-116)/(57-58) 115/57     Weight: 67.6 kg (149 lb 0.5 oz)  Body mass index is 21.38 kg/m².    Physical Exam        Significant Labs:   CBC:   Recent Labs  Lab 06/23/18  0106   WBC 8.13   HGB 6.4*   HCT 22.3*        CMP:   Recent Labs  Lab  06/23/18  0106   *   K 4.6   *   CO2 27      BUN 54*   CREATININE 1.97*   CALCIUM 8.5*   PROT 6.3   ALBUMIN 3.0*   BILITOT 0.6   ALKPHOS 73   AST 40   ALT 41   ANIONGAP 6*   EGFRNONAA 33.9*     Cardiac Markers: No results for input(s): CKMB, MYOGLOBIN, BNP, TROPISTAT in the last 48 hours.  Coagulation:   Recent Labs  Lab 06/23/18  0106   INR 1.9*       Significant Imaging: I have reviewed all pertinent imaging results/findings within the past 24 hours.   Imaging Results          X-Ray Chest AP Portable (In process)                     Assessment/Plan:     * Symptomatic anemia    Hgb 6.4, Hct 22.3.  He had 3 units RBCs post CABG.  He had GI workup on 2/1/18 with EGD showed Non-bleeding erosive gastropathy and hiatal hernia, and Colonoscopy showing internal hemorrhoids and non-bleeding diverticolosis, otherwise normal. There is an appointment for Video Endoscopy on 3/29/18.    --Transfuse 2 units RBCs          Chronic systolic congestive heart failure    Hypertension  Pulmonary Hypertension   S/P CABG x 1, S/P Mitral Valve Replacement ( 5/25/18)  5/28/18 Echo: EF 50-55%, PA 30, Probable Left Atrial Thrombus 3x2cm  Echocardiogram and Post-Op f/u scheduled with Dr. Bernstein on 7/9/18  Elevated pro-BNP 4650. Given Lasix 40 mg IV in the ED.  --Takes lasix 40 mg PO daily. Give additional 40 mg IV after first unit RBC  --Continue home amlodipine 10 mg daily, atorvastatin 40 mg daily, Hydralazine 25 mg q8h,             Urinary retention    Lainez catheter in place on admission  Has follow up with Urology, Delmy Gomes, NP.  Routine Lainez Care.  Continue home doxazosin 4 mg daily          Long term (current) use of anticoagulants    Goal INR 1.5-2 INR 1.9. Continue Coumadin 5 mg daily          Dysphagia    On enteral nutrition  S/P PEG Placement  Tube Feedings: Isosource 1.5 50 ml/hr. Protein supplement BID  PEG tube Care        Subacute bacterial endocarditis    S/P AVR   On six weeks of Ampicillin &  Ceftriaxone for Enterococcal SBE (End Date 7/13/18) via PICC Line.   Followed by ID, Dr. Powell Nnedu          ANNA (acute kidney injury)    Hypernatremia  BUN 54 (down from 79), Creatinine 1.97 (down from 2.4) S/P Thoracic surgery. Resolving.    Sodium 154 ~~baseline since surgery  Strict Intake and Output, Avoid nephrotoxins, and renally dose medications.            VTE Risk Mitigation         Ordered     warfarin (COUMADIN) tablet 5 mg  Daily      06/23/18 5389             Myra Solano NP  Department of Hospital Medicine   [facility]

## 2018-06-23 NOTE — ASSESSMENT & PLAN NOTE
S/P AVR   On six weeks of Ampicillin & Ceftriaxone for Enterococcal SBE (End Date 7/13/18) via PICC Line.   Followed by ID, Dr. Andre Ashraf

## 2018-06-23 NOTE — SUBJECTIVE & OBJECTIVE
Past Medical History:   Diagnosis Date    ANNA (acute kidney injury) 2/22/2018    Anemia     Anxiety     Arthritis     BPH (benign prostatic hyperplasia)     CHF (congestive heart failure) 5/23/2018    Coarse tremors     Coronary artery disease of native artery of native heart with stable angina pectoris 5/21/2018    Diverticulosis     Encounter for blood transfusion     Endocarditis     Hypertension     Urinary retention        Past Surgical History:   Procedure Laterality Date    APPENDECTOMY      COLONOSCOPY      COLONOSCOPY N/A 2/1/2018    Procedure: COLONOSCOPY;  Surgeon: iRchar Payan MD;  Location: [facility] (4TH FLR);  Service: Endoscopy;  Laterality: N/A;  PM prep    CREATION OF AORTOCORONARY ARTERY BYPASS N/A 5/25/2018    Procedure: AORTOCORONARY BYPASS-CABG;  Surgeon: Marvin Go MD;  Location: [facility] OR 2ND FLR;  Service: Cardiovascular;  Laterality: N/A;    EYE SURGERY Right     cataract extraction    FINGER SURGERY      FRACTURE SURGERY Right     index finger    MITRAL VALVE REPLACEMENT N/A 5/25/2018    Procedure: Mitral Valve Replacement;  Surgeon: Marvin Go MD;  Location: [facility] OR [address] [place] [address];  Service: Cardiovascular;  Laterality: N/A;    TONSILLECTOMY         Review of patient's allergies indicates:   Allergen Reactions    Shellfish containing products     Augmentin [amoxicillin-pot clavulanate]      Patient felt that it raised BP and requested to have it added as intolerance. Tolerated unasyn and ampicillin.    Ciprofloxacin     Flagyl [metronidazole]     Lisinopril Other (See Comments)     cough    Mysoline [primidone]     Omnicef [cefdinir]        No current facility-administered medications on file prior to encounter.      Current Outpatient Prescriptions on File Prior to Encounter   Medication Sig    amiodarone (PACERONE) 200 MG Tab 1 tablet (200 mg total) by Per G Tube route once daily.    amLODIPine (NORVASC) 10 MG tablet 1 tablet (10 mg total) by Per G  Tube route once daily.    ampicillin sodium (AMPICILLIN 2 G/100 ML NS, READY TO MIX SYSTEM,) Inject 100 mLs (2 g total) into the vein every 6 (six) hours. (Patient taking differently: Inject 8 g into the vein continuous. )    aspirin 81 MG Chew 4 tablets (324 mg total) by Per G Tube route once daily.    atorvastatin (LIPITOR) 40 MG tablet 1 tablet (40 mg total) by Per G Tube route once daily.    cefTRIAXone 2 g in dextrose 5 % 50 mL (ROCEPHIN) 2 g/50 mL PgBk IVPB Inject 50 mLs (2 g total) into the vein every 12 (twelve) hours.    doxazosin (CARDURA) 4 MG tablet 1 tablet (4 mg total) by Per G Tube route once daily.    furosemide (LASIX) 40 MG tablet Take 1 tablet (40 mg total) by mouth once daily.    hydrALAZINE (APRESOLINE) 25 MG tablet 1 tablet (25 mg total) by Per G Tube route every 8 (eight) hours.    loratadine (CLARITIN) 10 mg tablet Take 10 mg by mouth once daily.    mirtazapine (REMERON) 30 MG tablet Take 1 tablet (30 mg total) by mouth every evening.    pantoprazole (PROTONIX) 40 MG tablet Take 1 tablet (40 mg total) by mouth once daily.    potassium chloride 10% (KAYCIEL) 20 mEq/15 mL oral solution 15 mLs (20 mEq total) by Per G Tube route 2 (two) times daily.    warfarin (COUMADIN) 5 MG tablet 1 tablet (5 mg total) by Per G Tube route Daily.    albuterol-ipratropium 2.5mg-0.5mg/3mL (DUO-NEB) 0.5 mg-3 mg(2.5 mg base)/3 mL nebulizer solution 3 mLs every 4 (four) hours as needed.     benzonatate (TESSALON) 100 MG capsule Take 100 mg by mouth 3 (three) times daily as needed for Cough.    VENTOLIN HFA 90 mcg/actuation inhaler      Family History     Problem Relation (Age of Onset)    Heart disease Mother, Father    No Known Problems Sister, Brother    Stroke Mother        Social History Main Topics    Smoking status: Never Smoker    Smokeless tobacco: Never Used    Alcohol use No      Comment: none recently    Drug use: No    Sexual activity: Not Currently     Partners: Female     Review of  Systems   Constitutional: Negative for chills, diaphoresis, fever and unexpected weight change.   HENT: Positive for trouble swallowing ( PEG tube d/t dysphagia). Negative for congestion, sore throat and voice change.    Eyes: Negative for photophobia and visual disturbance.   Respiratory: Positive for cough and shortness of breath. Negative for wheezing.    Cardiovascular: Positive for leg swelling (Ankles, Improved after IV Laix in the ED). Negative for chest pain and palpitations.   Gastrointestinal: Negative for abdominal distention, abdominal pain, blood in stool, constipation, diarrhea, nausea and vomiting.   Endocrine: Negative for polydipsia, polyphagia and polyuria.   Genitourinary: Positive for difficulty urinating (Lainez d/t Uirnary Retention). Negative for decreased urine volume, hematuria and urgency.   Musculoskeletal: Negative for back pain, joint swelling, neck pain and neck stiffness.   Skin: Negative for color change, rash and wound.   Neurological: Positive for weakness. Negative for speech difficulty and headaches.   Psychiatric/Behavioral: Negative for agitation, decreased concentration and sleep disturbance. The patient is not nervous/anxious.      Objective:     Vital Signs (Most Recent):  Temp: 98.7 °F (37.1 °C) (06/23/18 0505)  Pulse: 92 (06/23/18 0505)  Resp: 20 (06/23/18 0505)  BP: (!) 115/57 (06/23/18 0505)  SpO2: 96 % (06/23/18 0038) Vital Signs (24h Range):  Temp:  [98.5 °F (36.9 °C)-98.7 °F (37.1 °C)] 98.7 °F (37.1 °C)  Pulse:  [92-97] 92  Resp:  [20-24] 20  SpO2:  [94 %-96 %] 96 %  BP: (115-116)/(57-58) 115/57     Weight: 67.6 kg (149 lb 0.5 oz)  Body mass index is 21.38 kg/m².    Physical Exam        Significant Labs:   CBC:   Recent Labs  Lab 06/23/18 0106   WBC 8.13   HGB 6.4*   HCT 22.3*        CMP:   Recent Labs  Lab 06/23/18 0106   *   K 4.6   *   CO2 27      BUN 54*   CREATININE 1.97*   CALCIUM 8.5*   PROT 6.3   ALBUMIN 3.0*   BILITOT 0.6   ALKPHOS  73   AST 40   ALT 41   ANIONGAP 6*   EGFRNONAA 33.9*     Cardiac Markers: No results for input(s): CKMB, MYOGLOBIN, BNP, TROPISTAT in the last 48 hours.  Coagulation:   Recent Labs  Lab 06/23/18  0106   INR 1.9*       Significant Imaging: I have reviewed all pertinent imaging results/findings within the past 24 hours.   Imaging Results          X-Ray Chest AP Portable (In process)

## 2018-06-23 NOTE — HPI
Mr. Messina is a 66yo M with a Anemia,Hypertension, Anxiety, Systolic Heart Failure, Pulmonary Hypertension, h/o mitral valve endocarditis and resultant severe MR, TR and pulmonary HTN. Etiology of Endcarditis is unknown.   He is s/p bioprosthetic mitral valve replacement and CABG x1 on 05/25/2018 at [facility].  His hospital course was complicated by Atrial Flutter, Cardioverted back to Sinus Rhythm; Dysphagia with PEG tube placed on 6/26/18; Urinary Retention, discharged with lackey with urology f/u on 6/26/18; and Epistaxis requiring ENT consult which resolved.  He was placed on six weeks of Ampicillin & Ceftriaxone for Enterococcal SBE (End Date 7/13/18.  He was discharged home with home health on 6/20/18.     He presented to [facility] ED as instructed by his Infectious Disease MD, Dr. Andre Ashraf, for a low blood count (Hgb 6.7, Hct 20.3).  His Hgb/Hct at discharge on 6/20/18 was 7.0/22.3 respectively. Labs drawn in the ED: Hgb 6.4, hct 22.3. He is also complaining of shortness of breath.  His sodium is 154, which appears to be his baseline since is surgery. His Pro-BNP is elevated at 4650, but his chest X-Ray does look significantly volume overloaded, but he did have pedal edema. He was given furosemide 40 mg IV in the ED.  He is admitted observation status to [facility] for Symptomatic Anemia requiring blood transfusion.

## 2018-06-23 NOTE — ASSESSMENT & PLAN NOTE
On enteral nutrition  S/P PEG Placement  Tube Feedings: Isosource 1.5 50 ml/hr. Protein supplement BID  PEG tube Care

## 2018-06-23 NOTE — ASSESSMENT & PLAN NOTE
Hypertension  Pulmonary Hypertension   S/P CABG x 1, S/P Mitral Valve Replacement ( 5/25/18)  5/28/18 Echo: EF 50-55%, PA 30, Probable Left Atrial Thrombus 3x2cm  Echocardiogram and Post-Op f/u scheduled with Dr. Bernstein on 7/9/18  Elevated pro-BNP 4650. Given Lasix 40 mg IV in the ED.  --Takes lasix 40 mg PO daily. Give additional 40 mg IV after first unit RBC  --Continue home amlodipine 10 mg daily, atorvastatin 40 mg daily, Hydralazine 25 mg q8h,

## 2018-06-24 VITALS
RESPIRATION RATE: 18 BRPM | HEART RATE: 94 BPM | BODY MASS INDEX: 21.34 KG/M2 | DIASTOLIC BLOOD PRESSURE: 63 MMHG | HEIGHT: 70 IN | OXYGEN SATURATION: 94 % | TEMPERATURE: 98 F | SYSTOLIC BLOOD PRESSURE: 120 MMHG | WEIGHT: 149.06 LBS

## 2018-06-24 PROCEDURE — G0378 HOSPITAL OBSERVATION PER HR: HCPCS

## 2018-06-24 PROCEDURE — 94761 N-INVAS EAR/PLS OXIMETRY MLT: CPT

## 2018-06-24 PROCEDURE — 25000003 PHARM REV CODE 250: Performed by: NURSE PRACTITIONER

## 2018-06-24 PROCEDURE — 63600175 PHARM REV CODE 636 W HCPCS: Performed by: NURSE PRACTITIONER

## 2018-06-24 RX ADMIN — POTASSIUM CHLORIDE 20 MEQ: 20 SOLUTION ORAL at 08:06

## 2018-06-24 RX ADMIN — ASPIRIN 81 MG 324 MG: 81 TABLET ORAL at 08:06

## 2018-06-24 RX ADMIN — CEFTRIAXONE SODIUM 2 G: 2 INJECTION, POWDER, FOR SOLUTION INTRAMUSCULAR; INTRAVENOUS at 08:06

## 2018-06-24 RX ADMIN — ATORVASTATIN CALCIUM 40 MG: 40 TABLET, FILM COATED ORAL at 08:06

## 2018-06-24 RX ADMIN — AMIODARONE HYDROCHLORIDE 200 MG: 200 TABLET ORAL at 08:06

## 2018-06-24 RX ADMIN — AMLODIPINE BESYLATE 10 MG: 5 TABLET ORAL at 08:06

## 2018-06-24 RX ADMIN — CETIRIZINE HYDROCHLORIDE 10 MG: 10 TABLET, FILM COATED ORAL at 08:06

## 2018-06-24 RX ADMIN — PANTOPRAZOLE SODIUM 40 MG: 40 TABLET, DELAYED RELEASE ORAL at 08:06

## 2018-06-24 RX ADMIN — DOXAZOSIN MESYLATE 4 MG: 2 TABLET ORAL at 08:06

## 2018-06-24 NOTE — ASSESSMENT & PLAN NOTE
Lainez catheter in place on admission  Has follow up with Urology, Delmy Gomes, NP.  Routine Lainez Care.  Continue home doxazosin 4 mg daily

## 2018-06-24 NOTE — PLAN OF CARE
Discharge orders noted, no HH or HME ordered.'    Future Appointments  Date Time Provider Department Center   6/26/2018 1:20 PM Delmy Gomes NP [facility] UROLOGY [facility]   7/9/2018 8:00 AM ECHO, [facility] ECHOLAB [facility]   7/9/2018 8:45 AM EKG, APPT [facility] EKG [facility]   7/9/2018 9:30 AM [facility] [id number] LB LIMIT [facility] XRAY OP [facility]   7/9/2018 9:45 AM Marvin Go MD [facility] CARDVAS [facility]   7/18/2018 1:30 PM Andre Ashraf MD [facility] ID [facility]     Pt's nurse will go over medications/signs and symptoms prior to discharge       06/24/18 0702   Final Note   Assessment Type Final Discharge Note   Discharge Disposition Home   What phone number can be called within the next 1-3 days to see how you are doing after discharge? [phone]   Hospital Follow Up  Appt(s) scheduled? No  (no f/u listed)   Right Care Referral Info   Post Acute Recommendation No Care     Nedra Avelar, RN Transitional Navigator  [phone]

## 2018-06-24 NOTE — DISCHARGE INSTRUCTIONS
Please keep previously scheduled follow up appointments.     Blood Transfusion (Adult), When You Need a (English) View Edit Remove  Anemia (English) View Edit Remove

## 2018-06-24 NOTE — PLAN OF CARE
Problem: Patient Care Overview  Goal: Plan of Care Review  Outcome: Ongoing (interventions implemented as appropriate)  Plan of care reviewed with patient. Patient verbalized complete understanding. Fall precautions maintained. Bed in lowest position, locked, call light within reach, and bed alarm on. Side rails up x's 2 with slip resistant socks on. Nurse instructed patient to notify staff for any assistance and pt verbalized complete understanding. Patient on telemetry throughout shift with no ectopy noted. Will continue to monitor Tube feeding Isosource 1.5 dannie infusing at 50ml\hr, ampicillin  Continue to infuse, discharge in the AM.

## 2018-06-24 NOTE — PLAN OF CARE
Pt is being discharged. Discharge teaching was reviewed with patient and family. Pt verbalized understanding. Refer to clinical references for pt education. PICC line; lackey catheter; and Gtube remains in place. Waiting for son for transport home.

## 2018-06-24 NOTE — DISCHARGE SUMMARY
[facility] Hospital Medicine  Discharge Summary      Patient Name: Rodolfo Messina  MRN: [id number]  Admission Date: 6/23/2018  Hospital Length of Stay: 0 days  Discharge Date and Time:  6/24/2018 10:36 AM  Attending Physician: Antione Camara MD   Discharging Provider: Antione Camara MD  Primary Care Provider: Deric Claudio MD      HPI:   Mr. Messina is a 66yo M with a Anemia,Hypertension, Anxiety, Systolic Heart Failure, Pulmonary Hypertension, h/o mitral valve endocarditis and resultant severe MR, TR and pulmonary HTN. Etiology of Endcarditis is unknown.   He is s/p bioprosthetic mitral valve replacement and CABG x1 on 05/25/2018 at [facility].  His hospital course was complicated by Atrial Flutter, Cardioverted back to Sinus Rhythm; Dysphagia with PEG tube placed on 6/26/18; Urinary Retention, discharged with lackey with urology f/u on 6/26/18; and Epistaxis requiring ENT consult which resolved.  He was placed on six weeks of Ampicillin & Ceftriaxone for Enterococcal SBE (End Date 7/13/18.  He was discharged home with home health on 6/20/18.     He presented to [facility] ED as instructed by his Infectious Disease MD, Dr. Andre Ashraf, for a low blood count (Hgb 6.7, Hct 20.3).  His Hgb/Hct at discharge on 6/20/18 was 7.0/22.3 respectively. Labs drawn in the ED: Hgb 6.4, hct 22.3. He is also complaining of shortness of breath.  His sodium is 154, which appears to be his baseline since is surgery. His Pro-BNP is elevated at 4650, but his chest X-Ray does look significantly volume overloaded, but he did have pedal edema. He was given furosemide 40 mg IV in the ED.  He is admitted observation status to [facility] for Symptomatic Anemia requiring blood transfusion.       * No surgery found *      Hospital Course:   Patient was transfused 2 units of RBCs and his Blood Counts increased to Hgb 8.1, Hct 20.1.  No evidence of bleeding.  He is discharged home with all of  his prior orders.  He will leave first thing in the morning when his ride is available.      Consults:     * Symptomatic anemia    Hgb 6.4, Hct 22.3.  He had 3 units RBCs post CABG.  He had GI workup on 2/1/18 with EGD showed Non-bleeding erosive gastropathy and hiatal hernia, and Colonoscopy showing internal hemorrhoids and non-bleeding diverticolosis, otherwise normal. There is an appointment for Video Endoscopy on 3/29/18.    --Transfused 2 units RBCs. Post Hgb 8.1, Hct 20.1          Anemia              Urinary retention    Lainez catheter in place on admission  Has follow up with Urology, Delmy Gomes, NP.  Routine Lainez Care.  Continue home doxazosin 4 mg daily          Long term (current) use of anticoagulants    Goal INR 1.5-2 INR 1.9. Continue Coumadin 5 mg daily          Subacute bacterial endocarditis    S/P AVR   On six weeks of Ampicillin & Ceftriaxone for Enterococcal SBE (End Date 7/13/18) via PICC Line.   Followed by ID, Dr. Andre Ashraf          ANNA (acute kidney injury)    Hypernatremia  BUN 54 (down from 79), Creatinine 1.97 (down from 2.4) S/P Thoracic surgery. Resolving.    Sodium 154 ~~baseline since surgery              Final Active Diagnoses:    Diagnosis Date Noted POA    PRINCIPAL PROBLEM:  Symptomatic anemia [D64.9] 02/22/2018 Yes    Chronic systolic congestive heart failure [I50.22] 05/23/2018 Yes    Hypernatremia [E87.0] 06/23/2018 Yes    Urinary retention [R33.9] 06/23/2018 Yes    Lainez catheter in place on admission [Z96.0] 06/23/2018 Not Applicable    Anemia [D64.9] 06/23/2018 Yes    Long term (current) use of anticoagulants [Z79.01] 06/21/2018 Not Applicable    Dysphagia [R13.10] 06/08/2018 Yes    On enteral nutrition [Z78.9] 06/04/2018 Yes    S/P CABG x 1 [Z95.1] 05/25/2018 Not Applicable    S/P MVR (mitral valve replacement) [Z95.2] 05/25/2018 Not Applicable    Pulmonary hypertension due to mitral valve disease [I27.22, I05.9] 02/27/2018 Yes    Subacute bacterial  endocarditis [I33.0] 02/27/2018 Yes    ANNA (acute kidney injury) [N17.9] 02/22/2018 Yes    HTN (hypertension) [I10] 11/09/2014 Yes     Chronic      Problems Resolved During this Admission:    Diagnosis Date Noted Date Resolved POA    Epistaxis [R04.0] 05/29/2018 06/23/2018 Yes       Discharged Condition: good    Disposition: Home-Health Care Svc    Follow Up: Keep follow up with PCP    Patient Instructions:     Diet NPO     Activity as tolerated     Notify your health care provider if you experience any of the following:  persistent nausea and vomiting or diarrhea     Notify your health care provider if you experience any of the following:  difficulty breathing or increased cough     Notify your health care provider if you experience any of the following:  increased confusion or weakness     Notify your health care provider if you experience any of the following:   Order Comments: Evidence of bleeding, bloody or dark stools.     Tube Feedings/Formulas   Order Specific Question Answer Comments   Select Adult Formula: Isosource 1.5 dannie    Route: Gastrostomy    Formula Rate (mL/hr): 50          Significant Diagnostic Studies: Labs:   CMP   Recent Labs  Lab 06/23/18  0106 06/23/18  0710   * 154*   K 4.6 4.6   * 121*   CO2 27 28    129*   BUN 54* 55*   CREATININE 1.97* 2.1*   CALCIUM 8.5* 8.8   PROT 6.3 5.8*   ALBUMIN 3.0* 2.3*   BILITOT 0.6 0.5   ALKPHOS 73 67   AST 40 32   ALT 41 36   ANIONGAP 6* 5*   ESTGFRAFRICA 39.2* 36*   EGFRNONAA 33.9* 31*   , CBC   Recent Labs  Lab 06/23/18  0106 06/23/18  0710 06/23/18  1909   WBC 8.13 7.13  --    HGB 6.4* 6.0* 8.1*   HCT 22.3* 20.1*  --     151  --    , INR   Lab Results   Component Value Date    INR 1.9 (H) 06/23/2018    INR 1.5 06/22/2018    INR 1.6 (H) 06/20/2018   , Troponin No results for input(s): TROPONINI in the last 168 hours. and All labs within the past 24 hours have been reviewed  Radiology:  Imaging Results          X-Ray Chest AP  Portable (Final result)  Result time 06/23/18 08:48:17    Final result by Ramonita Rueda MD (06/23/18 08:48:17)                 Impression:      No significant change compared to the prior exam.      Electronically signed by: Ramonita Rueda MD  Date:    06/23/2018  Time:    08:48             Narrative:    EXAMINATION:  XR CHEST AP PORTABLE    CLINICAL HISTORY:  CHF;    COMPARISON:  June 20, 2018    FINDINGS:  The cardiomediastinal structures are stable.  There is persistent mild pulmonary edema.  Prior sternotomy.  Small bibasilar pleural effusions.  Bones stable.                                Pending Diagnostic Studies:     None         Medications:  Reconciled Home Medications:      Medication List      CHANGE how you take these medications    AMPICILLIN 2 G/100 ML NS (READY TO MIX SYSTEM)  Inject 100 mLs (2 g total) into the vein every 6 (six) hours.  What changed:  · how much to take  · when to take this        CONTINUE taking these medications    albuterol-ipratropium 2.5 mg-0.5 mg/3 mL nebulizer solution  Commonly known as:  DUO-NEB  3 mLs every 4 (four) hours as needed.     amiodarone 200 MG Tab  Commonly known as:  PACERONE  1 tablet (200 mg total) by Per G Tube route once daily.     amLODIPine 10 MG tablet  Commonly known as:  NORVASC  1 tablet (10 mg total) by Per G Tube route once daily.     aspirin 81 MG Chew  4 tablets (324 mg total) by Per G Tube route once daily.     atorvastatin 40 MG tablet  Commonly known as:  LIPITOR  1 tablet (40 mg total) by Per G Tube route once daily.     benzonatate 100 MG capsule  Commonly known as:  TESSALON  Take 100 mg by mouth 3 (three) times daily as needed for Cough.     cefTRIAXone 2 g in dextrose 5 % 50 mL 2 g/50 mL Pgbk IVPB  Commonly known as:  ROCEPHIN  Inject 50 mLs (2 g total) into the vein every 12 (twelve) hours.     doxazosin 4 MG tablet  Commonly known as:  CARDURA  1 tablet (4 mg total) by Per G Tube route once daily.     furosemide 40 MG  tablet  Commonly known as:  LASIX  Take 1 tablet (40 mg total) by mouth once daily.     hydrALAZINE 25 MG tablet  Commonly known as:  APRESOLINE  1 tablet (25 mg total) by Per G Tube route every 8 (eight) hours.     loratadine 10 mg tablet  Commonly known as:  CLARITIN  Take 10 mg by mouth once daily.     mirtazapine 30 MG tablet  Commonly known as:  REMERON  Take 1 tablet (30 mg total) by mouth every evening.     pantoprazole 40 MG tablet  Commonly known as:  PROTONIX  Take 1 tablet (40 mg total) by mouth once daily.     potassium chloride 10% 20 mEq/15 mL oral solution  Commonly known as:  KAYCIEL  15 mLs (20 mEq total) by Per G Tube route 2 (two) times daily.     VENTOLIN HFA 90 mcg/actuation inhaler  Generic drug:  albuterol     warfarin 5 MG tablet  Commonly known as:  COUMADIN  1 tablet (5 mg total) by Per G Tube route Daily.            Indwelling Lines/Drains at time of discharge:   Lines/Drains/Airways     Peripherally Inserted Central Catheter Line                 PICC Double Lumen -- days         PICC Double Lumen 03/05/18 1539 right basilic 110 days         PICC Double Lumen 06/04/18 1654 left brachial 19 days          Central Venous Catheter Line                 Percutaneous Central Line Insertion/Assessment - Quad lumen  05/25/18 0800 left subclavian 29 days          Drain                 Gastrostomy/Enterostomy LUQ -- days         Urethral Catheter -- days         Gastrostomy/Enterostomy 06/07/18 1657 Gastrostomy-jejunostomy LUQ 16 days         Urethral Catheter 06/11/18 1711 Latex 12 days          Pressure Ulcer                 Pressure Injury 06/23/18 0710 Right medial Buttocks Stage 2 less than 1 day                Time spent on the discharge of patient: 35 minutes  Patient was seen and examined on the date of discharge and determined to be suitable for discharge.         Antione Camara MD  Department of Hospital Medicine  [facility]

## 2018-06-24 NOTE — HOSPITAL COURSE
Patient was transfused 2 units of RBCs and his Blood Counts increased to Hgb 8.1, Hct 20.1.  No evidence of bleeding.  He is discharged home with all of his prior orders.  He will leave first thing in the morning when his ride is available.

## 2018-06-24 NOTE — ASSESSMENT & PLAN NOTE
Hgb 6.4, Hct 22.3.  He had 3 units RBCs post CABG.  He had GI workup on 2/1/18 with EGD showed Non-bleeding erosive gastropathy and hiatal hernia, and Colonoscopy showing internal hemorrhoids and non-bleeding diverticolosis, otherwise normal. There is an appointment for Video Endoscopy on 3/29/18.    --Transfused 2 units RBCs. Post Hgb 8.1, Hct 20.1

## 2018-06-24 NOTE — ASSESSMENT & PLAN NOTE
Hypernatremia  BUN 54 (down from 79), Creatinine 1.97 (down from 2.4) S/P Thoracic surgery. Resolving.    Sodium 154 ~~baseline since surgery

## 2018-06-25 ENCOUNTER — ANTI-COAG VISIT (OUTPATIENT)
Dept: CARDIOLOGY | Facility: CLINIC | Age: 68
End: 2018-06-25

## 2018-06-25 ENCOUNTER — TELEPHONE (OUTPATIENT)
Dept: CARDIOTHORACIC SURGERY | Facility: CLINIC | Age: 68
End: 2018-06-25

## 2018-06-25 DIAGNOSIS — Z79.01 LONG TERM (CURRENT) USE OF ANTICOAGULANTS: ICD-10-CM

## 2018-06-25 DIAGNOSIS — Z95.2 S/P MVR (MITRAL VALVE REPLACEMENT): Primary | ICD-10-CM

## 2018-06-25 DIAGNOSIS — Z95.2 S/P MVR (MITRAL VALVE REPLACEMENT): ICD-10-CM

## 2018-06-25 LAB — INR PPP: 2.1

## 2018-06-25 NOTE — PROGRESS NOTES
Per CARLOS EDUARDO Fernandez: 1.5-2.0 is correct because this patient is prone to bleeding. I will try to find who the MD is, I thought this information had to be entered into the ambulatory referral to coumadin monitoring order.

## 2018-06-25 NOTE — TELEPHONE ENCOUNTER
Returned call to Nasra at [facility].  Marcelo stated that she saw Mr. Messina today and obtained bloodwork on him.  She stated that his hemoglobin and hematocrit were 7.7 and 23.8, and that his sodium was 164.  I confirmed with her that the sodium was 164, which she verbalized as correct.  I then spoke with pt's wife who stated that he still has a lackey catheter in place and is draining clear yellow urine.  She stated that she has given him a popsicle, but not much else by mouth.  Pt's wife stated that she is giving him 300mL of water via his G tube, every 8 hours.  Notified Tomeka Lopez NP, of pt's labs from today and pt's fluid intake.  EDUARDA Lopez, instructed me to have pt's wife increase the fluid intake to every 6 hours, 300mL each time, and to repeat labs tomorrow.  Informed pt's wife to give pt 300mL water, every 6 hours, via his G tube, and to have pt report to the lab tomorrow for labwork.  Pt's wife instructed to call with any questions or concerns.  Pt's wife verbalized understanding to all instructions.    ----- Message from Katarina Portillo sent at 6/25/2018  3:37 PM CDT -----  Contact: nasra/[facility]  [phone]//caller states that she needs to speak with nurse in ref to critical lab results//please call//thank you

## 2018-06-26 ENCOUNTER — LAB VISIT (OUTPATIENT)
Dept: LAB | Facility: HOSPITAL | Age: 68
End: 2018-06-26
Payer: MEDICARE

## 2018-06-26 ENCOUNTER — DOCUMENTATION ONLY (OUTPATIENT)
Dept: CARDIOTHORACIC SURGERY | Facility: CLINIC | Age: 68
End: 2018-06-26

## 2018-06-26 ENCOUNTER — OFFICE VISIT (OUTPATIENT)
Dept: UROLOGY | Facility: CLINIC | Age: 68
End: 2018-06-26
Payer: MEDICARE

## 2018-06-26 VITALS — WEIGHT: 149.06 LBS | HEIGHT: 70 IN | BODY MASS INDEX: 21.34 KG/M2

## 2018-06-26 DIAGNOSIS — R33.8 POSTOPERATIVE URINARY RETENTION: Primary | ICD-10-CM

## 2018-06-26 DIAGNOSIS — Z46.6 ENCOUNTER FOR FOLEY CATHETER REMOVAL: ICD-10-CM

## 2018-06-26 DIAGNOSIS — N99.89 POSTOPERATIVE URINARY RETENTION: Primary | ICD-10-CM

## 2018-06-26 DIAGNOSIS — Z95.2 S/P MVR (MITRAL VALVE REPLACEMENT): ICD-10-CM

## 2018-06-26 DIAGNOSIS — Z97.8 FOLEY CATHETER IN PLACE: ICD-10-CM

## 2018-06-26 LAB
ALBUMIN SERPL BCP-MCNC: 2.5 G/DL
ALP SERPL-CCNC: 81 U/L
ALT SERPL W/O P-5'-P-CCNC: 63 U/L
ANION GAP SERPL CALC-SCNC: 9 MMOL/L
AST SERPL-CCNC: 52 U/L
BASOPHILS # BLD AUTO: 0.03 K/UL
BASOPHILS NFR BLD: 0.5 %
BILIRUB SERPL-MCNC: 0.6 MG/DL
BUN SERPL-MCNC: 61 MG/DL
CALCIUM SERPL-MCNC: 9.3 MG/DL
CHLORIDE SERPL-SCNC: 129 MMOL/L
CO2 SERPL-SCNC: 22 MMOL/L
CREAT SERPL-MCNC: 2.2 MG/DL
DIFFERENTIAL METHOD: ABNORMAL
EOSINOPHIL # BLD AUTO: 0.4 K/UL
EOSINOPHIL NFR BLD: 7.4 %
ERYTHROCYTE [DISTWIDTH] IN BLOOD BY AUTOMATED COUNT: 16.3 %
EST. GFR  (AFRICAN AMERICAN): 34.3 ML/MIN/1.73 M^2
EST. GFR  (NON AFRICAN AMERICAN): 29.7 ML/MIN/1.73 M^2
GLUCOSE SERPL-MCNC: 121 MG/DL
HCT VFR BLD AUTO: 26.1 %
HGB BLD-MCNC: 7.5 G/DL
IMM GRANULOCYTES # BLD AUTO: 0.01 K/UL
IMM GRANULOCYTES NFR BLD AUTO: 0.2 %
LYMPHOCYTES # BLD AUTO: 0.5 K/UL
LYMPHOCYTES NFR BLD: 9.5 %
MCH RBC QN AUTO: 29 PG
MCHC RBC AUTO-ENTMCNC: 28.7 G/DL
MCV RBC AUTO: 101 FL
MONOCYTES # BLD AUTO: 0.4 K/UL
MONOCYTES NFR BLD: 6.2 %
NEUTROPHILS # BLD AUTO: 4.3 K/UL
NEUTROPHILS NFR BLD: 76.2 %
NRBC BLD-RTO: 0 /100 WBC
PLATELET # BLD AUTO: 165 K/UL
PMV BLD AUTO: 12.7 FL
POTASSIUM SERPL-SCNC: 5.6 MMOL/L
PROT SERPL-MCNC: 6.5 G/DL
RBC # BLD AUTO: 2.59 M/UL
SODIUM SERPL-SCNC: 160 MMOL/L
WBC # BLD AUTO: 5.69 K/UL

## 2018-06-26 PROCEDURE — 99213 OFFICE O/P EST LOW 20 MIN: CPT | Mod: 25,S$GLB,, | Performed by: NURSE PRACTITIONER

## 2018-06-26 PROCEDURE — 99999 PR PBB SHADOW E&M-EST. PATIENT-LVL III: CPT | Mod: PBBFAC,,, | Performed by: NURSE PRACTITIONER

## 2018-06-26 PROCEDURE — 80053 COMPREHEN METABOLIC PANEL: CPT

## 2018-06-26 PROCEDURE — 85025 COMPLETE CBC W/AUTO DIFF WBC: CPT

## 2018-06-26 PROCEDURE — 51700 IRRIGATION OF BLADDER: CPT | Mod: S$GLB,,, | Performed by: NURSE PRACTITIONER

## 2018-06-26 PROCEDURE — 99499 UNLISTED E&M SERVICE: CPT | Mod: S$GLB,,, | Performed by: NURSE PRACTITIONER

## 2018-06-26 PROCEDURE — 36415 COLL VENOUS BLD VENIPUNCTURE: CPT

## 2018-06-26 NOTE — PROGRESS NOTES
Received critical lab values on Mr. Messina on June 26: Chloride = 129 and Sodium = 160.  Tomeka Lopez NP, notified.  No new orders given.

## 2018-06-26 NOTE — PROGRESS NOTES
Patient s/p MVR per Dr. Go  On May 25.  Here for wound check. Sternotomy incision intact. Sutures evaluated for removal. No signs or symptoms of infection. Wound care instructions reviewed and reinforced, reminding pt to wash the incision every day with soap and water. Pt verbalized understanding and denies questions or complaints.  Per Asiya Fernandez NP's  instructions, sutures removed from drain incisions using aseptic technique.  Pt in no acute distress and wound is clean, dry, and intact.

## 2018-06-26 NOTE — PROGRESS NOTES
CHIEF COMPLAINT:    Mr. Messina is a 68 y.o. male presenting for urinary retention.     PRESENTING ILLNESS:    Rodolfo Messina is a 68 y.o. male with a PMH of Anemia,Hypertension, Anxiety, Systolic Heart Failure, Pulmonary Hypertension, h/o mitral valve endocarditis and resultant severe MR, TR and pulmonary HTN who presents for urinary retention and incomplete bladder emptying.   Last seen w/ TANNER Barrera on 2/14/18 for urinary retention.     He presents with his wife today. He was seen with TANNER Barrera for urinary retention and had an indwelling catheter placed in 2/2018. He failed 2 voiding trials. He was unable to have suds/cysto completed bc he was inpt at that time. He returns today for urinary retention s/p bioprosthetic mitral valve replacement and CABG x1 on 05/25/2018. He had the catheter removed while admitted inpt in February and states he had been urinating without difficulty since then. Reported a good FOS and complete bladder emptying. Denied frequency, urgency, and difficulty urianting.He was taking flomax 0.8 mg daily. He was pleased with his urination after lackey removed in February.  He reports no complaints with his lackey catheter. Denies gross hematuria. He had been switched to cardura 0.4 mg and told not to use Flomax. However, insurance does not cover the cardura.    He has had 12 L of RBC since December.  No known source of anemia has been found yet. He had a EGD and colonoscopy to evaluate.  Patient reports it was negative.       CT abd/pelvis 1/15/18 showed urinary bladder wall thickening, consistent with cystitis or chronic bladder outlet obstruction.  Kidneys demonstrate mild parenchymal loss and small cysts but no stone, solid mass, or obstruction.      REVIEW OF SYSTEMS:    Review of Systems    Constitutional: Negative for fever and chills.   HENT: Negative for hearing loss.   Eyes: Negative for visual disturbance.   Respiratory: Negative for shortness of breath.   Cardiovascular: Negative  for chest pain.   Gastrointestinal: PEG intact. Denies nausea and vomiting.   Genitourinary:  See above  Neurological: Negative for dizziness.   Psychiatric/Behavioral: Negative for confusion.       PATIENT HISTORY:    Past Medical History:   Diagnosis Date    ANNA (acute kidney injury) 2/22/2018    Anemia     Anxiety     Arthritis     BPH (benign prostatic hyperplasia)     CHF (congestive heart failure) 5/23/2018    Coarse tremors     Coronary artery disease of native artery of native heart with stable angina pectoris 5/21/2018    Diverticulosis     Encounter for blood transfusion     Endocarditis     Hypertension     Urinary retention        Past Surgical History:   Procedure Laterality Date    APPENDECTOMY      COLONOSCOPY      COLONOSCOPY N/A 2/1/2018    Procedure: COLONOSCOPY;  Surgeon: Richar Payan MD;  [facility] ([facility]);  Service: Endoscopy;  Laterality: N/A;  PM prep    CREATION OF AORTOCORONARY ARTERY BYPASS N/A 5/25/2018    Procedure: AORTOCORONARY BYPASS-CABG;  Surgeon: Marvin Go MD;  Location: [facility] OR [address] [place] [address];  Service: Cardiovascular;  Laterality: N/A;    EYE SURGERY Right     cataract extraction    FINGER SURGERY      FRACTURE SURGERY Right     index finger    MITRAL VALVE REPLACEMENT N/A 5/25/2018    Procedure: Mitral Valve Replacement;  Surgeon: Marvin Go MD;  Location: [facility] OR [address] [place] [address];  Service: Cardiovascular;  Laterality: N/A;    TONSILLECTOMY         Family History   Problem Relation Age of Onset    Stroke Mother     Heart disease Mother         CABG    Heart disease Father         CABG    No Known Problems Sister     No Known Problems Brother        Social History     Social History    Marital status:      Spouse name: N/A    Number of children: N/A    Years of education: N/A     Occupational History     Formerly [employer]     Social History Main Topics    Smoking status: Never Smoker    Smokeless tobacco: Never Used    Alcohol use No       Comment: none recently    Drug use: No    Sexual activity: Not Currently     Partners: Female     Other Topics Concern    Not on file     Social History Narrative    No narrative on file       Allergies:  Shellfish containing products; Augmentin [amoxicillin-pot clavulanate]; Ciprofloxacin; Flagyl [metronidazole]; Lisinopril; Mysoline [primidone]; and Omnicef [cefdinir]    Medications:    Current Outpatient Prescriptions:     albuterol-ipratropium 2.5mg-0.5mg/3mL (DUO-NEB) 0.5 mg-3 mg(2.5 mg base)/3 mL nebulizer solution, 3 mLs every 4 (four) hours as needed. , Disp: , Rfl:     amiodarone (PACERONE) 200 MG Tab, 1 tablet (200 mg total) by Per G Tube route once daily., Disp: 30 tablet, Rfl: 11    amLODIPine (NORVASC) 10 MG tablet, 1 tablet (10 mg total) by Per G Tube route once daily., Disp: 30 tablet, Rfl: 11    ampicillin sodium (AMPICILLIN 2 G/100 ML NS, READY TO MIX SYSTEM,), Inject 100 mLs (2 g total) into the vein every 6 (six) hours. (Patient taking differently: Inject 8 g into the vein continuous. ), Disp: 100 mL, Rfl: 0    aspirin 81 MG Chew, 4 tablets (324 mg total) by Per G Tube route once daily., Disp: , Rfl: 0    atorvastatin (LIPITOR) 40 MG tablet, 1 tablet (40 mg total) by Per G Tube route once daily., Disp: 90 tablet, Rfl: 3    benzonatate (TESSALON) 100 MG capsule, Take 100 mg by mouth 3 (three) times daily as needed for Cough., Disp: , Rfl:     cefTRIAXone 2 g in dextrose 5 % 50 mL (ROCEPHIN) 2 g/50 mL PgBk IVPB, Inject 50 mLs (2 g total) into the vein every 12 (twelve) hours., Disp: 1 each, Rfl: 0    doxazosin (CARDURA) 4 MG tablet, 1 tablet (4 mg total) by Per G Tube route once daily., Disp: 30 tablet, Rfl: 11    furosemide (LASIX) 40 MG tablet, Take 1 tablet (40 mg total) by mouth once daily., Disp: 30 tablet, Rfl: 1    hydrALAZINE (APRESOLINE) 25 MG tablet, 1 tablet (25 mg total) by Per G Tube route every 8 (eight) hours., Disp: 90 tablet, Rfl: 11    loratadine (CLARITIN) 10 mg  tablet, Take 10 mg by mouth once daily., Disp: , Rfl:     mirtazapine (REMERON) 30 MG tablet, Take 1 tablet (30 mg total) by mouth every evening., Disp: 90 tablet, Rfl: 3    pantoprazole (PROTONIX) 40 MG tablet, Take 1 tablet (40 mg total) by mouth once daily., Disp: 30 tablet, Rfl: 5    potassium chloride 10% (KAYCIEL) 20 mEq/15 mL oral solution, 15 mLs (20 mEq total) by Per G Tube route 2 (two) times daily., Disp: 473 mL, Rfl: 0    VENTOLIN HFA 90 mcg/actuation inhaler, , Disp: , Rfl:     warfarin (COUMADIN) 5 MG tablet, 1 tablet (5 mg total) by Per G Tube route Daily., Disp: 30 tablet, Rfl: 11    PHYSICAL EXAMINATION:    Constitutional: He is oriented to person, place, and time. He appears well-developed.  He is in no apparent distress.    Neck: No tracheal deviation present.     Cardiovascular: Normal rate.      Pulmonary/Chest: Effort normal. No respiratory distress.     Abdominal:  He exhibits no distension.      Neurological: He is alert and oriented to person, place, and time.     Skin: Skin is warm and dry.     Extremities: No pitting edema noted in lower extremities bilaterally    Psych: Cooperative with normal affect.      Physical Exam      LABS:    Lab Results   Component Value Date    PSADIAG 1.7 01/15/2018       IMPRESSION:    Encounter Diagnoses   Name Primary?    Postoperative urinary retention Yes    Encounter for Lackey catheter removal     Lackey catheter in place          PLAN:  -Voiding trial performed by Nurse Blunt. 300 ml of sterile water was instilled into bladder.  Lackey catheter was removed. Patient urinated  280 ml without difficulty.  Voiding trial passed.  Patient was instructed to drink plenty of fluids today.  Informed patient to return to clinic or emergency department (if after clinic hours) to have lackey catheter put back in if unable to urinate within 5 hours of lackey catheter removal or starts to experience bladder pressure/pain, decrease flow, straining/difficulty  urinating.    Patient voiced understanding.  -Continue flomax and stop cardura.   -RTC in 1 month to do a pvr. Discussed possible Suds w/ cysto if urinary retention occurs.     I encouraged him or any of his family members to call or email me with questions and/or concerns.    Delmy Gomes, RASHAAD-C

## 2018-06-27 ENCOUNTER — NURSE TRIAGE (OUTPATIENT)
Dept: ADMINISTRATIVE | Facility: CLINIC | Age: 68
End: 2018-06-27

## 2018-06-27 ENCOUNTER — TELEPHONE (OUTPATIENT)
Dept: FAMILY MEDICINE | Facility: CLINIC | Age: 68
End: 2018-06-27

## 2018-06-27 ENCOUNTER — DOCUMENTATION ONLY (OUTPATIENT)
Dept: CARDIOTHORACIC SURGERY | Facility: HOSPITAL | Age: 68
End: 2018-06-27

## 2018-06-27 ENCOUNTER — TELEPHONE (OUTPATIENT)
Dept: CARDIOTHORACIC SURGERY | Facility: CLINIC | Age: 68
End: 2018-06-27

## 2018-06-27 NOTE — TELEPHONE ENCOUNTER
Returned call to pt's wife.  Pt's wife stated that since she called at 10:30 this morning, she spoke with Tomeka Lopez NP.  Pt's wife stated that per EDUARDA Lopez, pt needs to follow up with his PCP regarding abnormal lab results.  Pt's wife verbalized understanding and denied further questions or concerns.  Pt's wife instructed to call the clinic with any questions or concerns, which she verbalized understanding.    ----- Message from Katarina Portillo sent at 6/27/2018 10:32 AM CDT -----  Contact: cecy/wife  [phone]///caller states that she needs to speak with nurse in ref to a few questions she has about pts visit//please call/thank you

## 2018-06-27 NOTE — TELEPHONE ENCOUNTER
Reason for Disposition   Nursing judgment    Protocols used: ST NO PROTOCOL AVAILABLE - INFORMATION ONLY-A-[place]  wife is wanting to speak to PCP about patient's labs results. Please contact her to advise

## 2018-06-27 NOTE — TELEPHONE ENCOUNTER
----- Message from Monse Agosto sent at 6/27/2018  1:17 PM CDT -----  Contact: wife Mrs Andrade  Wife requesting a returned call from Dr Claudio. Would like to discuss medications.

## 2018-06-27 NOTE — PROGRESS NOTES
Spoke with Mrs. Messina and informed her of critical lab results and to follow up with PCP today. Wife agreed to do so. Also wife informed to hold potassium.     Tomeka PURDY

## 2018-06-28 ENCOUNTER — TELEPHONE (OUTPATIENT)
Dept: UROLOGY | Facility: CLINIC | Age: 68
End: 2018-06-28

## 2018-06-28 ENCOUNTER — ANTI-COAG VISIT (OUTPATIENT)
Dept: CARDIOLOGY | Facility: CLINIC | Age: 68
End: 2018-06-28

## 2018-06-28 ENCOUNTER — TELEPHONE (OUTPATIENT)
Dept: INFECTIOUS DISEASES | Facility: HOSPITAL | Age: 68
End: 2018-06-28

## 2018-06-28 DIAGNOSIS — Z79.01 LONG TERM (CURRENT) USE OF ANTICOAGULANTS: ICD-10-CM

## 2018-06-28 DIAGNOSIS — R31.0 GROSS HEMATURIA: Primary | ICD-10-CM

## 2018-06-28 DIAGNOSIS — Z95.2 S/P MVR (MITRAL VALVE REPLACEMENT): ICD-10-CM

## 2018-06-28 LAB — INR PPP: 3.1

## 2018-06-28 RX ORDER — SULFAMETHOXAZOLE AND TRIMETHOPRIM 800; 160 MG/1; MG/1
1 TABLET ORAL ONCE
Status: CANCELLED | OUTPATIENT
Start: 2018-06-28 | End: 2018-06-28

## 2018-06-28 RX ORDER — LIDOCAINE HYDROCHLORIDE 20 MG/ML
JELLY TOPICAL ONCE
Status: CANCELLED | OUTPATIENT
Start: 2018-06-28 | End: 2018-06-28

## 2018-06-28 NOTE — PHYSICIAN QUERY
PT Name: Rodolfo Messina  MR #: [id number]    Physician Query Form - Respiratory Condition Clarification      CDS/: Key Stokes RN  CCDS               Contact information: [email or web address]     This form is a permanent document in the medical record.    Query Date: June 28, 2018    By submitting this query, we are merely seeking further clarification of documentation. Please utilize your independent clinical judgment when addressing the question(s) below.    The Medical record contains the following   Indicators   Supporting Clinical Findings Location in Medical Record   X   SOB, HAQ, Wheezing, Productive Cough, Use of Accessory Muscles, etc.  Respiratory: Negative for cough, chest tightness and shortness of breath.  RR = 13-29, SpO2 = %   Admitted five days post-procedure with increased work of breathing, demonstrated clot in right atrium requiring anticoagulation. On nasal cannula for supplemental oxygen, otherwise stable.    H&P 5/23         CC Surgery H&P 5/30   X   Acute/Chronic Illness  mitral valve endocarditis and resultant severe MR, TR and pulmonary HTN.  He is admitted to the SICU pre-operatively for IABP placement tomorrow and has MVR, TV repair, AVR and CABG planned for Friday 5/25.   H&P 5/23   X   Radiology Findings  Diffuse ground-glass attenuation densities throughout bilateral lungs with an upper lobe predominance and subpleural sparing that is likely secondary to pulmonary edema, possibly acute on chronic in this patient with a recent history of mitral valve replacement      Previously seen consolidation in the right lower lobe is almost totally resolved with only slight, patchy opacity still present; this may have represented pneumonia or aspiration  Chest CT 6/15               CXR 5/23   X   Respiratory Distress or Failure  Gradually over the past week, he has developed worsening hypoxemic respiratory failure with increasing bilateral opacities on his chest radiographs.   At the time of our visit he was siting at bedside and not in any acute resp distress. Chest exam has some rales at bases, without wheezes or rhonchi. Oximetry adequate on nasal O2 support     Pulmonary/Chest: Effort normal. No respiratory distress. He has no wheezes. He has rales  Pulmonology consult 6/15                     Interventional Cards consult 5/24   X   Hypoxia or Hypercapnia  Acute respiratory failure with hypoxia   CC Surgery progress note 6/18   X   RR         ABGs         O2 sat  weaned down to 3L O2 nasal cannula, SATs maintained above 95%   His respiration is 12 and oxygen saturation is 96%.   ICU RN note 5/28       CTS progress note 5/24   X   BiPAP/Intubation  Plan for IABP out today then wean to extubate. Intubated and sedated   Pt extubated to 50% VM. Pt tolerated well.    The patient was weaned from the drips and extubated in the ICU per protocol.   CT Surgery progress note 5/26     RT progress note 5/26     Discharge Summary   X   Supplemental O2  SpO2: 97 %    O2 Device (Oxygen Therapy): nasal cannula w/ humidification   CTS progress note 5/27      Home O2, Oxygen Dependence        Treatment     X   Other  Mitral Valve Replacement and CABG x 1   Pt admitted to SICU from the floor for SOB and increased work of breathing; pt currently on non-rebreather with current O2 sats 100% and all other VSS  -Extubated, saturating well on room air   OR note 5/25      ICU RN note 6/13          H&P 5/23     Provider, please specify diagnosis or diagnoses associated with above clinical findings.  Please clarify the respiratory condition diagnosis:    [    ] Acute Respiratory Failure with Hypoxia     [    ] Acute Respiratory Insufficiency  [    ] Acute Respiratory Distress  [    ] Hypoxia Only  [    ] No Respiratory Failure, Maintained on Vent for Routine Care or Airway Protection  [  X  ] Other Respiratory Diagnosis (please specify): ___Fluid overload_  [    ] Clinically Undetermined    Please document in  your progress notes daily for the duration of treatment until resolved and include in your discharge summary.

## 2018-06-28 NOTE — TELEPHONE ENCOUNTER
Spoke with wife. She reports urinating straight blood all day. She reports no dysuria, frequency, incomplete bladder emptying, abdominal pain, or difficulty urinating. He believes he is emptying his bladder without problems. Discussed possible causes of hematuria. Explained if felt dizzy or lightheaded or unable to void to go to the ER. Discussed need for cysto. Will schedule outpt.  She verbalized understanding.

## 2018-06-28 NOTE — PATIENT INSTRUCTIONS
Cystoscopy    Cystoscopy is a procedure that lets your doctor look directly inside your urethra and bladder. It can be used to:  · Help diagnose a problem with your urethra, bladder, or kidneys.  · Take a sample (biopsy) of bladder or urethral tissue.  · Treat certain problems (such as removing kidney stones).  · Place a stent to bypass an obstruction.  · Take special X-rays of the kidneys.  Based on the findings, your doctor may recommend other tests or treatments.  What is a cystoscope?  A cystoscope is a telescope-like instrument that contains lenses and fiberoptics (small glass wires that make bright light). The cystoscope may be straight and rigid, or flexible to bend around curves in the urethra. The doctor may look directly into the cystoscope, or project the image onto a monitor.  Getting ready  · Ask your doctor if you should stop taking any medicines before the procedure.  · Ask whether you should avoid eating or drinking anything after midnight before the procedure.  · Follow any other instructions your doctor gives you.  Tell your doctor before the exam if you:  · Take any medicines, such as aspirin or blood thinners  · Have allergies to any medicines  · Are pregnant   The procedure  Cystoscopy is done in the doctors office, surgery center, or hospital. The doctor and a nurse are present during the procedure. It takes only a few minutes, longer if a biopsy, X-ray, or treatment needs to be done.  During the procedure:  · You lie on an exam table on your back, knees bent and legs apart. You are covered with a drape.  · Your urethra and the area around it are washed. Anesthetic jelly may be applied to numb the urethra. Other pain medicine is usually not needed. In some cases, you may be offered a mild sedative to help you relax. If a more extensive procedure is to be done, such as a biopsy or kidney stone removal, general anesthesia may be needed.  · The cystoscope is inserted. A sterile fluid is put  into the bladder to expand it. You may feel pressure from this fluid.  · When the procedure is done, the cystoscope is removed.  After the procedure  If you had a sedative, general anesthesia, or spinal anesthesia, you must have someone drive you home. Once youre home:  · Drink plenty of fluids.  · You may have burning or light bleeding when you urinate--this is normal.  · Medicines may be prescribed to ease any discomfort or prevent infection. Take these as directed.  · Call your doctor if you have heavy bleeding or blood clots, burning that lasts more than a day, a fever over 100°F  (38° C), or trouble urinating.  Date Last Reviewed: 1/1/2017  © 0935-1261 [employer]. [address] [place] [address], [place] [address]. All rights reserved. This information is not intended as a substitute for professional medical care. Always follow your healthcare professional's instructions.

## 2018-06-28 NOTE — LETTER
[facility] Cou[facility]  [address]  [facility] [address]  Phone: [phone]  Fax: [phone]       Rodolfo Messina  [address] [place] [address]      Our records indicate an appointment was missed to have a PT/INR checked.  We have made multiple attempts to contact you by phone and were unsuccessful.  It is extremely important that our clinic is contacted immediately in order to reschedule the missed appointment, as it is important that you are monitored regularly while taking Coumadin (warfarin).  If monitoring by our clinic is no longer required, please call and inform our clinic so that we may update our records and discontinue further attempts to reach you.      Please call the Coumadin Clinic immediately upon receiving this letter, failure to do so may result in discharge from the clinic.  No response to this letter within 7 days will result in discharge from our clinic.      Contact information for the Coumadin Clinic:    Phone: [phone]    Fax: [phone]      Thank You,    [facility] Staff

## 2018-06-28 NOTE — TELEPHONE ENCOUNTER
Called pt to discuss hematuria. Left VM to call me back.       ----- Message from Denice Taylor LPN sent at 6/28/2018  1:59 PM CDT -----  Contact: [phone]      ----- Message -----  From: Dory Cruz MA  Sent: 6/28/2018   1:46 PM  To: [facility] Staff    Needs Advice    Reason for call:   Pt is passing quite a bit of blood in his urine and is worried because he had open heart surgery about a month ago and had to get units of blood.      Communication Preference: [phone]

## 2018-06-28 NOTE — TELEPHONE ENCOUNTER
Sodium is elevated.  Asked his wife to increase his free water to 600 ml 3 times a day through the feeding tube.

## 2018-06-29 ENCOUNTER — HOSPITAL ENCOUNTER (INPATIENT)
Facility: HOSPITAL | Age: 68
LOS: 14 days | Discharge: HOME HEALTH | DRG: 698 | End: 2018-07-13
Attending: EMERGENCY MEDICINE | Admitting: EMERGENCY MEDICINE
Payer: MEDICARE

## 2018-06-29 ENCOUNTER — TELEPHONE (OUTPATIENT)
Dept: PEDIATRIC UROLOGY | Facility: CLINIC | Age: 68
End: 2018-06-29

## 2018-06-29 DIAGNOSIS — Z79.01 CHRONIC ANTICOAGULATION: ICD-10-CM

## 2018-06-29 DIAGNOSIS — D64.9 SYMPTOMATIC ANEMIA: ICD-10-CM

## 2018-06-29 DIAGNOSIS — R31.9 HEMATURIA, UNSPECIFIED TYPE: Primary | ICD-10-CM

## 2018-06-29 DIAGNOSIS — Z95.2 S/P MVR (MITRAL VALVE REPLACEMENT): ICD-10-CM

## 2018-06-29 DIAGNOSIS — D50.9 IRON DEFICIENCY ANEMIA, UNSPECIFIED IRON DEFICIENCY ANEMIA TYPE: ICD-10-CM

## 2018-06-29 DIAGNOSIS — D64.9 ANEMIA, UNSPECIFIED TYPE: ICD-10-CM

## 2018-06-29 LAB
ABO + RH BLD: NORMAL
ALBUMIN SERPL BCP-MCNC: 2.4 G/DL
ALP SERPL-CCNC: 87 U/L
ALT SERPL W/O P-5'-P-CCNC: 54 U/L
ANION GAP SERPL CALC-SCNC: 6 MMOL/L
AST SERPL-CCNC: 36 U/L
BACTERIA #/AREA URNS AUTO: ABNORMAL /HPF
BASOPHILS # BLD AUTO: 0.03 K/UL
BASOPHILS NFR BLD: 0.5 %
BILIRUB SERPL-MCNC: 0.4 MG/DL
BILIRUB UR QL STRIP: NEGATIVE
BLD GP AB SCN CELLS X3 SERPL QL: NORMAL
BUN SERPL-MCNC: 56 MG/DL
CALCIUM SERPL-MCNC: 8.7 MG/DL
CHLORIDE SERPL-SCNC: 127 MMOL/L
CLARITY UR REFRACT.AUTO: ABNORMAL
CO2 SERPL-SCNC: 24 MMOL/L
COLOR UR AUTO: ABNORMAL
CREAT SERPL-MCNC: 2.1 MG/DL
DIFFERENTIAL METHOD: ABNORMAL
EOSINOPHIL # BLD AUTO: 0.4 K/UL
EOSINOPHIL NFR BLD: 7.4 %
ERYTHROCYTE [DISTWIDTH] IN BLOOD BY AUTOMATED COUNT: 15.9 %
EST. GFR  (AFRICAN AMERICAN): 36.3 ML/MIN/1.73 M^2
EST. GFR  (NON AFRICAN AMERICAN): 31.4 ML/MIN/1.73 M^2
GLUCOSE SERPL-MCNC: 126 MG/DL
GLUCOSE UR QL STRIP: NEGATIVE
HCT VFR BLD AUTO: 22 %
HGB BLD-MCNC: 6.6 G/DL
HGB UR QL STRIP: ABNORMAL
HYALINE CASTS UR QL AUTO: 0 /LPF
IMM GRANULOCYTES # BLD AUTO: 0.01 K/UL
IMM GRANULOCYTES NFR BLD AUTO: 0.2 %
INR PPP: 4.1
KETONES UR QL STRIP: NEGATIVE
LEUKOCYTE ESTERASE UR QL STRIP: ABNORMAL
LYMPHOCYTES # BLD AUTO: 0.6 K/UL
LYMPHOCYTES NFR BLD: 11 %
MCH RBC QN AUTO: 30.4 PG
MCHC RBC AUTO-ENTMCNC: 30 G/DL
MCV RBC AUTO: 101 FL
MICROSCOPIC COMMENT: ABNORMAL
MONOCYTES # BLD AUTO: 0.3 K/UL
MONOCYTES NFR BLD: 5.7 %
NEUTROPHILS # BLD AUTO: 4.2 K/UL
NEUTROPHILS NFR BLD: 75.2 %
NITRITE UR QL STRIP: NEGATIVE
NRBC BLD-RTO: 0 /100 WBC
PH UR STRIP: 7 [PH] (ref 5–8)
PLATELET # BLD AUTO: 178 K/UL
PMV BLD AUTO: 13.4 FL
POCT GLUCOSE: 91 MG/DL (ref 70–110)
POTASSIUM SERPL-SCNC: 4.5 MMOL/L
PROT SERPL-MCNC: 5.9 G/DL
PROT UR QL STRIP: ABNORMAL
PROTHROMBIN TIME: 39.6 SEC
RBC # BLD AUTO: 2.17 M/UL
RBC #/AREA URNS AUTO: >100 /HPF (ref 0–4)
SODIUM SERPL-SCNC: 157 MMOL/L
SP GR UR STRIP: 1.01 (ref 1–1.03)
SQUAMOUS #/AREA URNS AUTO: 4 /HPF
URN SPEC COLLECT METH UR: ABNORMAL
UROBILINOGEN UR STRIP-ACNC: NEGATIVE EU/DL
WBC # BLD AUTO: 5.64 K/UL
WBC #/AREA URNS AUTO: 0 /HPF (ref 0–5)

## 2018-06-29 PROCEDURE — 96374 THER/PROPH/DIAG INJ IV PUSH: CPT

## 2018-06-29 PROCEDURE — 86922 COMPATIBILITY TEST ANTIGLOB: CPT

## 2018-06-29 PROCEDURE — 86850 RBC ANTIBODY SCREEN: CPT

## 2018-06-29 PROCEDURE — 99284 EMERGENCY DEPT VISIT MOD MDM: CPT | Mod: 25

## 2018-06-29 PROCEDURE — 25000003 PHARM REV CODE 250: Performed by: PHYSICIAN ASSISTANT

## 2018-06-29 PROCEDURE — 80053 COMPREHEN METABOLIC PANEL: CPT

## 2018-06-29 PROCEDURE — 36430 TRANSFUSION BLD/BLD COMPNT: CPT

## 2018-06-29 PROCEDURE — 85025 COMPLETE CBC W/AUTO DIFF WBC: CPT

## 2018-06-29 PROCEDURE — 11000001 HC ACUTE MED/SURG PRIVATE ROOM

## 2018-06-29 PROCEDURE — 63600175 PHARM REV CODE 636 W HCPCS: Performed by: INTERNAL MEDICINE

## 2018-06-29 PROCEDURE — 83036 HEMOGLOBIN GLYCOSYLATED A1C: CPT

## 2018-06-29 PROCEDURE — 25000003 PHARM REV CODE 250: Performed by: INTERNAL MEDICINE

## 2018-06-29 PROCEDURE — 81001 URINALYSIS AUTO W/SCOPE: CPT

## 2018-06-29 PROCEDURE — 99223 1ST HOSP IP/OBS HIGH 75: CPT | Mod: ,,, | Performed by: INTERNAL MEDICINE

## 2018-06-29 PROCEDURE — G0378 HOSPITAL OBSERVATION PER HR: HCPCS

## 2018-06-29 PROCEDURE — 96361 HYDRATE IV INFUSION ADD-ON: CPT

## 2018-06-29 PROCEDURE — P9016 RBC LEUKOCYTES REDUCED: HCPCS

## 2018-06-29 PROCEDURE — 85610 PROTHROMBIN TIME: CPT

## 2018-06-29 PROCEDURE — 99284 EMERGENCY DEPT VISIT MOD MDM: CPT | Mod: ,,, | Performed by: PHYSICIAN ASSISTANT

## 2018-06-29 RX ORDER — MIRTAZAPINE 15 MG/1
30 TABLET, FILM COATED ORAL NIGHTLY
Status: DISCONTINUED | OUTPATIENT
Start: 2018-06-29 | End: 2018-07-13 | Stop reason: HOSPADM

## 2018-06-29 RX ORDER — IPRATROPIUM BROMIDE AND ALBUTEROL SULFATE 2.5; .5 MG/3ML; MG/3ML
3 SOLUTION RESPIRATORY (INHALATION) EVERY 4 HOURS PRN
Status: DISCONTINUED | OUTPATIENT
Start: 2018-06-29 | End: 2018-07-13 | Stop reason: HOSPADM

## 2018-06-29 RX ORDER — BENZONATATE 100 MG/1
100 CAPSULE ORAL 3 TIMES DAILY PRN
Status: DISCONTINUED | OUTPATIENT
Start: 2018-06-29 | End: 2018-07-09

## 2018-06-29 RX ORDER — CETIRIZINE HYDROCHLORIDE 5 MG/1
5 TABLET ORAL DAILY
Status: DISCONTINUED | OUTPATIENT
Start: 2018-06-30 | End: 2018-07-13 | Stop reason: HOSPADM

## 2018-06-29 RX ORDER — GLUCAGON 1 MG
1 KIT INJECTION
Status: DISCONTINUED | OUTPATIENT
Start: 2018-06-29 | End: 2018-07-13 | Stop reason: HOSPADM

## 2018-06-29 RX ORDER — IBUPROFEN 200 MG
16 TABLET ORAL
Status: DISCONTINUED | OUTPATIENT
Start: 2018-06-29 | End: 2018-07-13 | Stop reason: HOSPADM

## 2018-06-29 RX ORDER — AMLODIPINE BESYLATE 10 MG/1
10 TABLET ORAL DAILY
Status: DISCONTINUED | OUTPATIENT
Start: 2018-06-30 | End: 2018-07-13 | Stop reason: HOSPADM

## 2018-06-29 RX ORDER — GUAIFENESIN 100 MG/5ML
200 SOLUTION ORAL EVERY 4 HOURS PRN
Status: DISCONTINUED | OUTPATIENT
Start: 2018-06-29 | End: 2018-07-13 | Stop reason: HOSPADM

## 2018-06-29 RX ORDER — FUROSEMIDE 40 MG/1
40 TABLET ORAL DAILY
Status: DISCONTINUED | OUTPATIENT
Start: 2018-06-30 | End: 2018-06-30

## 2018-06-29 RX ORDER — ASPIRIN 325 MG
325 TABLET ORAL DAILY
Status: DISCONTINUED | OUTPATIENT
Start: 2018-06-30 | End: 2018-07-02

## 2018-06-29 RX ORDER — POTASSIUM CHLORIDE 20 MEQ/15ML
20 SOLUTION ORAL 2 TIMES DAILY
Status: DISCONTINUED | OUTPATIENT
Start: 2018-06-29 | End: 2018-07-13 | Stop reason: HOSPADM

## 2018-06-29 RX ORDER — HYDROCODONE BITARTRATE AND ACETAMINOPHEN 500; 5 MG/1; MG/1
TABLET ORAL
Status: DISCONTINUED | OUTPATIENT
Start: 2018-06-29 | End: 2018-07-04

## 2018-06-29 RX ORDER — PANTOPRAZOLE SODIUM 40 MG/1
40 FOR SUSPENSION ORAL DAILY
Status: DISCONTINUED | OUTPATIENT
Start: 2018-06-30 | End: 2018-07-13 | Stop reason: HOSPADM

## 2018-06-29 RX ORDER — SODIUM CHLORIDE 0.9 % (FLUSH) 0.9 %
5 SYRINGE (ML) INJECTION
Status: DISCONTINUED | OUTPATIENT
Start: 2018-06-29 | End: 2018-07-13 | Stop reason: HOSPADM

## 2018-06-29 RX ORDER — HYDRALAZINE HYDROCHLORIDE 25 MG/1
25 TABLET, FILM COATED ORAL EVERY 8 HOURS
Status: DISCONTINUED | OUTPATIENT
Start: 2018-06-29 | End: 2018-07-02

## 2018-06-29 RX ORDER — DOXAZOSIN 2 MG/1
4 TABLET ORAL DAILY
Status: DISCONTINUED | OUTPATIENT
Start: 2018-06-30 | End: 2018-06-29

## 2018-06-29 RX ORDER — AMLODIPINE BESYLATE 10 MG/1
10 TABLET ORAL
Status: COMPLETED | OUTPATIENT
Start: 2018-06-29 | End: 2018-06-29

## 2018-06-29 RX ORDER — TAMSULOSIN HYDROCHLORIDE 0.4 MG/1
0.4 CAPSULE ORAL DAILY
Status: DISCONTINUED | OUTPATIENT
Start: 2018-06-30 | End: 2018-07-13 | Stop reason: HOSPADM

## 2018-06-29 RX ORDER — AMIODARONE HYDROCHLORIDE 200 MG/1
200 TABLET ORAL ONCE
Status: COMPLETED | OUTPATIENT
Start: 2018-06-29 | End: 2018-06-29

## 2018-06-29 RX ORDER — AMIODARONE HYDROCHLORIDE 200 MG/1
200 TABLET ORAL DAILY
Status: DISCONTINUED | OUTPATIENT
Start: 2018-06-30 | End: 2018-07-13 | Stop reason: HOSPADM

## 2018-06-29 RX ORDER — DOXAZOSIN 2 MG/1
4 TABLET ORAL ONCE
Status: COMPLETED | OUTPATIENT
Start: 2018-06-29 | End: 2018-06-29

## 2018-06-29 RX ORDER — ATORVASTATIN CALCIUM 20 MG/1
40 TABLET, FILM COATED ORAL DAILY
Status: DISCONTINUED | OUTPATIENT
Start: 2018-06-30 | End: 2018-07-13 | Stop reason: HOSPADM

## 2018-06-29 RX ORDER — IBUPROFEN 200 MG
24 TABLET ORAL
Status: DISCONTINUED | OUTPATIENT
Start: 2018-06-29 | End: 2018-07-13 | Stop reason: HOSPADM

## 2018-06-29 RX ORDER — PANTOPRAZOLE SODIUM 40 MG/1
40 TABLET, DELAYED RELEASE ORAL DAILY
Status: DISCONTINUED | OUTPATIENT
Start: 2018-06-30 | End: 2018-06-29 | Stop reason: SDUPTHER

## 2018-06-29 RX ADMIN — AMIODARONE HYDROCHLORIDE 200 MG: 200 TABLET ORAL at 07:06

## 2018-06-29 RX ADMIN — DOXAZOSIN MESYLATE 4 MG: 2 TABLET ORAL at 07:06

## 2018-06-29 RX ADMIN — CEFTRIAXONE SODIUM 2 G: 2 INJECTION, POWDER, FOR SOLUTION INTRAMUSCULAR; INTRAVENOUS at 09:06

## 2018-06-29 RX ADMIN — AMLODIPINE BESYLATE 10 MG: 10 TABLET ORAL at 07:06

## 2018-06-29 RX ADMIN — SODIUM CHLORIDE, SODIUM LACTATE, POTASSIUM CHLORIDE, AND CALCIUM CHLORIDE 1000 ML: .6; .31; .03; .02 INJECTION, SOLUTION INTRAVENOUS at 05:06

## 2018-06-29 RX ADMIN — AMPICILLIN 2 G: 2 INJECTION, POWDER, FOR SOLUTION INTRAVENOUS at 11:06

## 2018-06-29 RX ADMIN — HYDRALAZINE HYDROCHLORIDE 25 MG: 25 TABLET, FILM COATED ORAL at 11:06

## 2018-06-29 NOTE — ED PROVIDER NOTES
Encounter Date: 6/29/2018    SCRIBE #1 NOTE: I, Marcos Bradley, am scribing for, and in the presence of,  Dr. Werner. I have scribed the following portions of the note - the APC attestation.       History     Chief Complaint   Patient presents with    Hematuria     had catheter removed Tuesday and has been urinating bright red blood since, denies pain with urination      PEG Tube Problem     peg tube clogged since yesterday, unable to get any medications      68-year-old male with multiple comorbidities including CHF, hypertension, CAD status post CABG, endocarditis presents for hematuria and blocked PEG tube.  The patient reports bright red blood in urine for 3 days, he had a catheter removed on Tuesday after which he has noticed painless hematuria.  Denies penile discharge, fevers, back pain, nausea/vomiting.  Denies chest pain, shortness of breath, lightheadedness or syncope.  Also complaining of blocked PEG tube, patient reports he was not able to take his daily medications per G-tube this a.m. Patient's wife attempted to flush the tube without success.          Review of patient's allergies indicates:   Allergen Reactions    Shellfish containing products     Augmentin [amoxicillin-pot clavulanate]      Patient felt that it raised BP and requested to have it added as intolerance. Tolerated unasyn and ampicillin.    Ciprofloxacin     Flagyl [metronidazole]     Lisinopril Other (See Comments)     cough    Mysoline [primidone]     Omnicef [cefdinir]      Past Medical History:   Diagnosis Date    ANNA (acute kidney injury) 2/22/2018    Anemia     Anxiety     Arthritis     BPH (benign prostatic hyperplasia)     CHF (congestive heart failure) 5/23/2018    Coarse tremors     Coronary artery disease of native artery of native heart with stable angina pectoris 5/21/2018    Diverticulosis     Encounter for blood transfusion     Endocarditis     Hypertension     Urinary retention      Past Surgical  History:   Procedure Laterality Date    APPENDECTOMY      COLONOSCOPY      COLONOSCOPY N/A 2/1/2018    Procedure: COLONOSCOPY;  Surgeon: Richar Payan MD;  Location: [facility] (4TH FLR);  Service: Endoscopy;  Laterality: N/A;  PM prep    CREATION OF AORTOCORONARY ARTERY BYPASS N/A 5/25/2018    Procedure: AORTOCORONARY BYPASS-CABG;  Surgeon: Marvin Go MD;  [facility];  Service: Cardiovascular;  Laterality: N/A;    EYE SURGERY Right     cataract extraction    FINGER SURGERY      FRACTURE SURGERY Right     index finger    MITRAL VALVE REPLACEMENT N/A 5/25/2018    Procedure: Mitral Valve Replacement;  Surgeon: Marvin Go MD;  Location: [facility] OR [address] [place] [address];  Service: Cardiovascular;  Laterality: N/A;    TONSILLECTOMY       Family History   Problem Relation Age of Onset    Stroke Mother     Heart disease Mother         CABG    Heart disease Father         CABG    No Known Problems Sister     No Known Problems Brother      Social History   Substance Use Topics    Smoking status: Never Smoker    Smokeless tobacco: Never Used    Alcohol use No      Comment: none recently     Review of Systems   Constitutional: Positive for fatigue. Negative for fever.   Respiratory: Negative for shortness of breath.    Cardiovascular: Negative for chest pain and palpitations.   Gastrointestinal: Negative for abdominal pain, blood in stool, constipation, diarrhea, nausea and vomiting.   Endocrine: Negative for polyuria.   Genitourinary: Positive for hematuria. Negative for difficulty urinating, discharge, dysuria, flank pain, genital sores and urgency.   Musculoskeletal: Negative for back pain.   Skin: Negative for color change.   Neurological: Negative for light-headedness and headaches.   Hematological: Bruises/bleeds easily.       Physical Exam     Initial Vitals [06/29/18 1226]   BP Pulse Resp Temp SpO2   (!) 108/55 96 18 99.4 °F (37.4 °C) 98 %      MAP       --         Vitals:    06/29/18 1701    BP: (!) 119/57   Pulse: 89   Resp: 19   Temp:        Physical Exam    Nursing note and vitals reviewed.  Constitutional: He is not diaphoretic. No distress.   Ill appearing. Wife at bedside   HENT:   Head: Normocephalic and atraumatic.   Eyes: EOM are normal. Pupils are equal, round, and reactive to light.   Neck: Normal range of motion. Neck supple.   Cardiovascular: Normal rate, regular rhythm and normal heart sounds. Exam reveals no gallop and no friction rub.    No murmur heard.  Pulmonary/Chest: Breath sounds normal. No respiratory distress. He has no wheezes. He has no rhonchi. He has no rales. He exhibits no tenderness.   Abdominal: Soft. Bowel sounds are normal. He exhibits no distension and no mass. There is no tenderness. There is no rebound and no guarding.   PEG tube in place, dressing clean, dry and intact. No surrounding erythema, edema or warmth   Musculoskeletal: Normal range of motion. He exhibits no edema or tenderness.   Neurological: He is alert and oriented to person, place, and time.   Skin: Skin is warm and dry.   Psychiatric: He has a normal mood and affect.         ED Course   Procedures  Labs Reviewed   CBC W/ AUTO DIFFERENTIAL - Abnormal; Notable for the following:        Result Value    RBC 2.17 (*)     Hemoglobin 6.6 (*)     Hematocrit 22.0 (*)      (*)     MCHC 30.0 (*)     RDW 15.9 (*)     MPV 13.4 (*)     Lymph # 0.6 (*)     Gran% 75.2 (*)     Lymph% 11.0 (*)     All other components within normal limits   COMPREHENSIVE METABOLIC PANEL - Abnormal; Notable for the following:     Sodium 157 (*)     Chloride 127 (*)     Glucose 126 (*)     BUN, Bld 56 (*)     Creatinine 2.1 (*)     Total Protein 5.9 (*)     Albumin 2.4 (*)     ALT 54 (*)     Anion Gap 6 (*)     eGFR if  36.3 (*)     eGFR if non  31.4 (*)     All other components within normal limits   URINALYSIS, REFLEX TO URINE CULTURE   PROTIME-INR   TYPE & SCREEN   PREPARE RBC SOFT           Imaging Results    None          Medical Decision Making:   History:   Old Medical Records: I decided to obtain old medical records.  Clinical Tests:   Lab Tests: Ordered and Reviewed       APC / Resident Notes:   68-year-old male presents for painless hematuria and blocked PEG tube.  Patient is ill-appearing, however vitals stable with normal exam. DDx for hematuria includes UTI, bladder injury, urethral injury. Given that patient is on Coumadin with baseline low hemoglobin, concern for anemia due to blood loss.    PEG tube suctioned and flushed, now able to be reduced.  Patient given 600 mL water per PEG tube per ID instructions.      Labs remarkable for hemoglobin of 6.6, decreased from 7.53 days ago.  Elevated chloride, sodium, BUN and Cr at baseline.  INR elevated at 4.1, 3.1 yesterday.  UA with greater than 100 RBCs, no WBCs.  Will transfuse 2 units PRBCs and admit to Medicine for observation.  I discussed this patient with my supervising physician.    Leigh Beck PA-C         Scribe Attestation:   Scribe #1: I performed the above scribed service and the documentation accurately describes the services I performed. I attest to the accuracy of the note.    Attending Attestation:     Physician Attestation Statement for NP/PA:   I discussed this assessment and plan of this patient with the NP/PA, but I did not personally examine the patient. The face to face encounter was performed by the NP/PA.    Other NP/PA Attestation Additions:    History of Present Illness: 69 y/o man presents for evaluation of persistent hematuria and malfunction of his PEG tube.                       Clinical Impression:   The primary encounter diagnosis was Hematuria, unspecified type. A diagnosis of Anemia, unspecified type was also pertinent to this visit.      Disposition:   Disposition: Placed in Observation  Condition: Stable         Leigh Beck PA-C  06/29/18 6783

## 2018-06-29 NOTE — ED NOTES
Patient with current infusion of Ampicillin  8G/587 ML infusion currently infusions through [place] pump, CG changes infusion every 24 hours and changed bag at 9pm last night. Patient with continuous tube feeding at 50 mL per hour,

## 2018-06-29 NOTE — ED TRIAGE NOTES
Patient arrived to ED for he had lackey catheter removed on 6/26/18.  Patient and caregiver reports hematuria since Tuesday.  CG states that patient has not been able to give medication through the PEG tube.

## 2018-06-29 NOTE — ED NOTES
LOC: The patient is awake, alert and aware of environment with an appropriate affect, the patient is oriented x 3 and speaking appropriately.  APPEARANCE: Patient resting comfortably and in no acute distress, patient is clean and well groomed, patient's clothing is properly fastened.  SKIN: The skin is warm and dry, patient has normal skin turgor and moist mucus membranes, noted scabbing area to midline chest, upper quadrant x 4 with scabbing intact. Noted to have stage II to sacrum. PEG tube intact to left lower quadrant with sutures intact.      MUSKULOSKELETAL: Patient moving all extremities well, patient is suffering from weakness.    RESPIRATORY: Airway is open and patent, respirations are spontaneous, patient has a normal effort and rate. Breath sounds are clear and equal bilaterally.  CARDIAC: Normal heart sounds. No peripheral edema.  ABDOMEN: Soft and non tender to palpation, no distention noted. Bowel sounds present.  NEURO: No neuro deficits, hand grasp equal, no drift noted, no facial droop noted. Speech is clear.

## 2018-06-30 PROBLEM — R79.1 SUPRATHERAPEUTIC INR: Status: ACTIVE | Noted: 2018-06-30

## 2018-06-30 LAB
ALBUMIN SERPL BCP-MCNC: 2.3 G/DL
ALP SERPL-CCNC: 93 U/L
ALT SERPL W/O P-5'-P-CCNC: 53 U/L
ANION GAP SERPL CALC-SCNC: 8 MMOL/L
AST SERPL-CCNC: 33 U/L
BASOPHILS # BLD AUTO: 0.05 K/UL
BASOPHILS NFR BLD: 0.8 %
BILIRUB SERPL-MCNC: 0.5 MG/DL
BLD PROD TYP BPU: NORMAL
BLD PROD TYP BPU: NORMAL
BLOOD UNIT EXPIRATION DATE: NORMAL
BLOOD UNIT EXPIRATION DATE: NORMAL
BLOOD UNIT TYPE CODE: 6200
BLOOD UNIT TYPE CODE: 6200
BLOOD UNIT TYPE: NORMAL
BLOOD UNIT TYPE: NORMAL
BUN SERPL-MCNC: 53 MG/DL
CALCIUM SERPL-MCNC: 8.7 MG/DL
CHLORIDE SERPL-SCNC: 126 MMOL/L
CO2 SERPL-SCNC: 24 MMOL/L
CODING SYSTEM: NORMAL
CODING SYSTEM: NORMAL
CREAT SERPL-MCNC: 1.9 MG/DL
DIFFERENTIAL METHOD: ABNORMAL
DISPENSE STATUS: NORMAL
DISPENSE STATUS: NORMAL
EOSINOPHIL # BLD AUTO: 0.5 K/UL
EOSINOPHIL NFR BLD: 7.3 %
ERYTHROCYTE [DISTWIDTH] IN BLOOD BY AUTOMATED COUNT: 16.4 %
EST. GFR  (AFRICAN AMERICAN): 41 ML/MIN/1.73 M^2
EST. GFR  (NON AFRICAN AMERICAN): 35.4 ML/MIN/1.73 M^2
ESTIMATED AVG GLUCOSE: 97 MG/DL
GLUCOSE SERPL-MCNC: 122 MG/DL
HBA1C MFR BLD HPLC: 5 %
HCT VFR BLD AUTO: 29.2 %
HGB BLD-MCNC: 9.1 G/DL
IMM GRANULOCYTES # BLD AUTO: 0.02 K/UL
IMM GRANULOCYTES NFR BLD AUTO: 0.3 %
INR PPP: 3.6
LYMPHOCYTES # BLD AUTO: 0.7 K/UL
LYMPHOCYTES NFR BLD: 11.2 %
MCH RBC QN AUTO: 30.3 PG
MCHC RBC AUTO-ENTMCNC: 31.2 G/DL
MCV RBC AUTO: 97 FL
MONOCYTES # BLD AUTO: 0.4 K/UL
MONOCYTES NFR BLD: 6.2 %
NEUTROPHILS # BLD AUTO: 4.9 K/UL
NEUTROPHILS NFR BLD: 74.2 %
NRBC BLD-RTO: 0 /100 WBC
NUM UNITS TRANS PACKED RBC: NORMAL
NUM UNITS TRANS PACKED RBC: NORMAL
PLATELET # BLD AUTO: 193 K/UL
PMV BLD AUTO: ABNORMAL FL
POTASSIUM SERPL-SCNC: 4.1 MMOL/L
PROT SERPL-MCNC: 5.6 G/DL
PROTHROMBIN TIME: 34.8 SEC
RBC # BLD AUTO: 3 M/UL
SODIUM SERPL-SCNC: 157 MMOL/L
SODIUM SERPL-SCNC: 158 MMOL/L
SODIUM SERPL-SCNC: 158 MMOL/L
WBC # BLD AUTO: 6.58 K/UL

## 2018-06-30 PROCEDURE — 63600175 PHARM REV CODE 636 W HCPCS: Performed by: INTERNAL MEDICINE

## 2018-06-30 PROCEDURE — 63600175 PHARM REV CODE 636 W HCPCS: Performed by: NURSE PRACTITIONER

## 2018-06-30 PROCEDURE — 84295 ASSAY OF SERUM SODIUM: CPT

## 2018-06-30 PROCEDURE — 80053 COMPREHEN METABOLIC PANEL: CPT

## 2018-06-30 PROCEDURE — 25000003 PHARM REV CODE 250: Performed by: INTERNAL MEDICINE

## 2018-06-30 PROCEDURE — P9016 RBC LEUKOCYTES REDUCED: HCPCS

## 2018-06-30 PROCEDURE — 99233 SBSQ HOSP IP/OBS HIGH 50: CPT | Mod: ,,, | Performed by: HOSPITALIST

## 2018-06-30 PROCEDURE — 11000001 HC ACUTE MED/SURG PRIVATE ROOM

## 2018-06-30 PROCEDURE — 85025 COMPLETE CBC W/AUTO DIFF WBC: CPT

## 2018-06-30 PROCEDURE — 36415 COLL VENOUS BLD VENIPUNCTURE: CPT

## 2018-06-30 PROCEDURE — 85610 PROTHROMBIN TIME: CPT

## 2018-06-30 PROCEDURE — 63600175 PHARM REV CODE 636 W HCPCS: Performed by: HOSPITALIST

## 2018-06-30 PROCEDURE — 25000003 PHARM REV CODE 250: Performed by: NURSE PRACTITIONER

## 2018-06-30 RX ORDER — FUROSEMIDE 10 MG/ML
40 INJECTION INTRAMUSCULAR; INTRAVENOUS ONCE
Status: COMPLETED | OUTPATIENT
Start: 2018-06-30 | End: 2018-06-30

## 2018-06-30 RX ORDER — FUROSEMIDE 10 MG/ML
80 INJECTION INTRAMUSCULAR; INTRAVENOUS 2 TIMES DAILY
Status: DISCONTINUED | OUTPATIENT
Start: 2018-06-30 | End: 2018-07-02

## 2018-06-30 RX ADMIN — CETIRIZINE HYDROCHLORIDE 5 MG: 5 TABLET, FILM COATED ORAL at 10:06

## 2018-06-30 RX ADMIN — PANTOPRAZOLE SODIUM 40 MG: 40 GRANULE, DELAYED RELEASE ORAL at 09:06

## 2018-06-30 RX ADMIN — HYDRALAZINE HYDROCHLORIDE 25 MG: 25 TABLET, FILM COATED ORAL at 02:06

## 2018-06-30 RX ADMIN — TAMSULOSIN HYDROCHLORIDE 0.4 MG: 0.4 CAPSULE ORAL at 10:06

## 2018-06-30 RX ADMIN — ATORVASTATIN CALCIUM 40 MG: 20 TABLET, FILM COATED ORAL at 10:06

## 2018-06-30 RX ADMIN — FUROSEMIDE 80 MG: 10 INJECTION, SOLUTION INTRAMUSCULAR; INTRAVENOUS at 11:06

## 2018-06-30 RX ADMIN — AMLODIPINE BESYLATE 10 MG: 10 TABLET ORAL at 10:06

## 2018-06-30 RX ADMIN — CEFTRIAXONE SODIUM 2 G: 2 INJECTION, POWDER, FOR SOLUTION INTRAMUSCULAR; INTRAVENOUS at 09:06

## 2018-06-30 RX ADMIN — FUROSEMIDE 40 MG: 40 TABLET ORAL at 10:06

## 2018-06-30 RX ADMIN — AMPICILLIN 2 G: 2 INJECTION, POWDER, FOR SOLUTION INTRAVENOUS at 06:06

## 2018-06-30 RX ADMIN — MIRTAZAPINE 30 MG: 15 TABLET, FILM COATED ORAL at 09:06

## 2018-06-30 RX ADMIN — BENZONATATE 100 MG: 100 CAPSULE ORAL at 10:06

## 2018-06-30 RX ADMIN — POTASSIUM CHLORIDE 20 MEQ: 20 SOLUTION ORAL at 10:06

## 2018-06-30 RX ADMIN — POTASSIUM CHLORIDE 20 MEQ: 20 SOLUTION ORAL at 09:06

## 2018-06-30 RX ADMIN — GUAIFENESIN 200 MG: 200 SOLUTION ORAL at 12:06

## 2018-06-30 RX ADMIN — FUROSEMIDE 80 MG: 10 INJECTION, SOLUTION INTRAMUSCULAR; INTRAVENOUS at 06:06

## 2018-06-30 RX ADMIN — AMPICILLIN 2 G: 2 INJECTION, POWDER, FOR SOLUTION INTRAVENOUS at 12:06

## 2018-06-30 RX ADMIN — FUROSEMIDE 40 MG: 10 INJECTION, SOLUTION INTRAMUSCULAR; INTRAVENOUS at 01:06

## 2018-06-30 RX ADMIN — FUROSEMIDE 80 MG: 10 INJECTION, SOLUTION INTRAMUSCULAR; INTRAVENOUS at 02:06

## 2018-06-30 RX ADMIN — AMIODARONE HYDROCHLORIDE 200 MG: 200 TABLET ORAL at 10:06

## 2018-06-30 RX ADMIN — ASPIRIN 325 MG ORAL TABLET 325 MG: 325 PILL ORAL at 10:06

## 2018-06-30 RX ADMIN — HYDRALAZINE HYDROCHLORIDE 25 MG: 25 TABLET, FILM COATED ORAL at 09:06

## 2018-06-30 RX ADMIN — MIRTAZAPINE 30 MG: 15 TABLET, FILM COATED ORAL at 12:06

## 2018-06-30 RX ADMIN — CEFTRIAXONE SODIUM 2 G: 2 INJECTION, POWDER, FOR SOLUTION INTRAMUSCULAR; INTRAVENOUS at 11:06

## 2018-06-30 RX ADMIN — POTASSIUM CHLORIDE 20 MEQ: 20 SOLUTION ORAL at 12:06

## 2018-06-30 RX ADMIN — AMPICILLIN 2 G: 2 INJECTION, POWDER, FOR SOLUTION INTRAVENOUS at 05:06

## 2018-06-30 NOTE — NURSING
Received call from urology o/c requested Post /Void Bladder Scan and to document. Patient voided 100cc of urine bladder scanned 35cc and again 40 cc.

## 2018-06-30 NOTE — NURSING
Morning vitals obtained. Patient reports respiratory status improved. Blood pressure 99/47. HR 87 Denies pain. Called o/c spoke to Marcos Hopson NP informed of patient's blood pressure and discussed patients status still getting blood transfusion. RBVO Marcos Hopson NP / Cleopatra Persaud LPN Hold 0600 am dose of Hydralazine 25 mg

## 2018-06-30 NOTE — ASSESSMENT & PLAN NOTE
2/2 free water deficit  Only receiving 300 cc q8 free water flushes via PEG at home  Increased to 600 cc q8  Monitor Na q6 - caution with overcorrection. Do not correct by more than 12 in a day

## 2018-06-30 NOTE — HPI
Rodolfo Messina is a 68 y.o. male with hx of HFrEF, MR s/p MVR (5/2018), CAD s/p CABG (5/2018), dysphagia s/p PEG, hypernatremia, anemia, SBE on rocephin / amp.     He presented to the ED last night with complaints of hematuria and non-functioning PEG tube. He was seen in clinic by Pushpa Gomes on 6/26 for lackey removal and passed a voiding trial at that time. His lackey was placed for urinary retention following MVR on 5/25. He then began urinating blood on 6/28 and has progressively worsened. He denies issues with urination at this time, feels as though he is emptying well with no straining or hesitancy. Denies dysuria, fevers or chills. He has had issues with urinary retention in the past and is on Cardura 4 mg via his PEG tube.     On presentation he was found to be anemic with an H/H of 6.6/22 for which he was given 2 units PRBC. His INR is elevated at 4.1 and his creatinine is 2.1 which is baseline. PVR last night was 35 cc.     Patient was recently admitted to [facility] on 6/23 with anemia and required transfusion at that time. He has required multiple transfusions over the last 6 months however no source of bleeding has been identified.

## 2018-06-30 NOTE — CONSULTS
[facility]  Urology  Consult Note    Patient Name: Rodolfo Messina  MRN: [id number]  Admission Date: 6/29/2018  Hospital Length of Stay: 0   Code Status: Full Code   Attending Provider: Padilla Rose MD  Consulting Provider: Bozena Byers MD  Primary Care Physician: Deric Claudio MD  Principal Problem:Symptomatic anemia    Inpatient consult to Urology  Consult performed by: BOZENA BYERS  Consult ordered by: ARIS MEJIA          Subjective:     HPI:  Rodolfo Messina is a 68 y.o. male with hx of HFrEF, MR s/p MVR (5/2018), CAD s/p CABG (5/2018), dysphagia s/p PEG, hypernatremia, anemia, SBE on rocephin / amp.     He presented to the ED last night with complaints of hematuria and non-functioning PEG tube. He was seen in clinic by Pushpa Gomes on 6/26 for lackey removal and passed a voiding trial at that time. His lackey was placed for urinary retention following MVR on 5/25. He then began urinating blood on 6/28 and has progressively worsened. He denies issues with urination at this time, feels as though he is emptying well with no straining or hesitancy. Denies dysuria, fevers or chills. He has had issues with urinary retention in the past and is on Cardura 4 mg via his PEG tube.     On presentation he was found to be anemic with an H/H of 6.6/22 for which he was given 2 units PRBC. His INR is elevated at 4.1 and his creatinine is 2.1 which is baseline. PVR last night was 35 cc.     Patient was recently admitted to [facility] on 6/23 with anemia and required transfusion at that time. He has required multiple transfusions over the last 6 months however no source of bleeding has been identified.     Past Medical History:   Diagnosis Date    ANNA (acute kidney injury) 2/22/2018    Anemia     Anxiety     Arthritis     BPH (benign prostatic hyperplasia)     CHF (congestive heart failure) 5/23/2018    Coarse tremors     Coronary artery disease of native artery of native heart with  stable angina pectoris 5/21/2018    Diverticulosis     Encounter for blood transfusion     Endocarditis     Hypertension     Urinary retention        Past Surgical History:   Procedure Laterality Date    APPENDECTOMY      COLONOSCOPY      COLONOSCOPY N/A 2/1/2018    Procedure: COLONOSCOPY;  Surgeon: Richar Payan MD;  Location: [facility] (4TH FLR);  Service: Endoscopy;  Laterality: N/A;  PM prep    CREATION OF AORTOCORONARY ARTERY BYPASS N/A 5/25/2018    Procedure: AORTOCORONARY BYPASS-CABG;  Surgeon: Marvin Go MD;  [facility];  Service: Cardiovascular;  Laterality: N/A;    EYE SURGERY Right     cataract extraction    FINGER SURGERY      FRACTURE SURGERY Right     index finger    MITRAL VALVE REPLACEMENT N/A 5/25/2018    Procedure: Mitral Valve Replacement;  Surgeon: Marvin Go MD;  Location: [facility] OR [address] [place] [address];  Service: Cardiovascular;  Laterality: N/A;    TONSILLECTOMY         Review of patient's allergies indicates:   Allergen Reactions    Shellfish containing products     Augmentin [amoxicillin-pot clavulanate]      Patient felt that it raised BP and requested to have it added as intolerance. Tolerated unasyn and ampicillin.    Ciprofloxacin     Flagyl [metronidazole]     Lisinopril Other (See Comments)     cough    Mysoline [primidone]     Omnicef [cefdinir]        Family History     Problem Relation (Age of Onset)    Heart disease Mother, Father    No Known Problems Sister, Brother    Stroke Mother          Social History Main Topics    Smoking status: Never Smoker    Smokeless tobacco: Never Used    Alcohol use No      Comment: none recently    Drug use: No    Sexual activity: Not Currently     Partners: Female       Review of Systems   Constitutional: Negative for chills and fever.   HENT: Positive for trouble swallowing.    Eyes: Negative for pain.   Gastrointestinal: Negative for abdominal pain, constipation and nausea.   Genitourinary: Positive for  hematuria. Negative for difficulty urinating, dysuria, flank pain, penile pain and testicular pain.   Neurological: Positive for weakness.   Psychiatric/Behavioral: Negative for behavioral problems.       Objective:     Temp:  [96.7 °F (35.9 °C)-99.4 °F (37.4 °C)] 97.9 °F (36.6 °C)  Pulse:  [86-97] 91  Resp:  [12-20] 16  SpO2:  [93 %-100 %] 98 %  BP: ()/(47-65) 117/63     Body mass index is 21.64 kg/m².       Bladder Scan Volume (mL): 35 mL (06/29/18 2200)  Post Void Cath Residual (mL): 35 mL (06/29/18 2200)    Drains     Drain                 Gastrostomy/Enterostomy LUQ -- days         Gastrostomy/Enterostomy 06/07/18 1657 Gastrostomy-jejunostomy LUQ 22 days         Gastrostomy/Enterostomy 06/29/18 2200 Gastrostomy-jejunostomy less than 1 day                Physical Exam   Constitutional: He is oriented to person, place, and time. He appears well-developed. No distress.   HENT:   Head: Normocephalic and atraumatic.   Eyes: No scleral icterus.   Neck: No JVD present.   Cardiovascular: Normal rate and regular rhythm.    Pulmonary/Chest: No respiratory distress.   Abdominal: Soft. He exhibits no distension. There is no tenderness. There is no rebound and no guarding.   PEG tube in place   Genitourinary: Penis normal. Right testis shows no mass, no swelling and no tenderness. Left testis shows no mass, no swelling and no tenderness. Uncircumcised. No paraphimosis.   Genitourinary Comments: Concentrated brown urine in urinal    Neurological: He is alert and oriented to person, place, and time.   Skin: He is not diaphoretic. There is pallor.     Psychiatric: He has a normal mood and affect. His behavior is normal. Thought content normal.       Significant Labs:    BMP:    Recent Labs  Lab 06/26/18  1304 06/29/18  1441   * 157*   K 5.6* 4.5   * 127*   CO2 22* 24   BUN 61* 56*   CREATININE 2.2* 2.1*   CALCIUM 9.3 8.7       CBC:    Recent Labs  Lab 06/23/18  1909 06/26/18  1304 06/29/18  1441   WBC  --   5.69 5.64   HGB 8.1* 7.5* 6.6*   HCT  --  26.1* 22.0*   PLT  --  165 178       Urine Studies:   Recent Labs  Lab 06/29/18  1702   COLORU Red*   APPEARANCEUA Cloudy*   PHUR 7.0   SPECGRAV 1.015   PROTEINUA 2+*   GLUCUA Negative   KETONESU Negative   BILIRUBINUA Negative   OCCULTUA 3+*   NITRITE Negative   UROBILINOGEN Negative   LEUKOCYTESUR Trace*   RBCUA >100*   WBCUA 0   BACTERIA Few*   SQUAMEPITHEL 4   HYALINECASTS 0       Significant Imaging:    US Retroperitoneal 2/23/18:  No solid masses or hydronephrosis. Bilateral simple cysts.       Assessment and Plan:     Hematuria    Rodolfo Messina is a 68 y.o. male with gross hematuria following lackey removal with INR of 4.1 and symptomatic anemia    - Suspect hematuria is due to minor trauma with lackey removal in setting of supratherapeutic INR and expect that it will continue to improve with correction  - Patient has normal renal US from 2/2018 and is scheduled for cystoscopy on 7/10 which will rule out bladder tumor as source, can discuss with him at that time the risk/benefit of undergo retrograde pyelogram to evaluate the upper tracts  - Do not suspect hematuria as source of patient's persistent anemia as urine this morning appeared more concentrated than willa hematuria   - Currently emptying his bladder therefore no need for lackey catheter placement at this time  - Continue cardura 4 mg and follow up for cystoscopy            VTE Risk Mitigation     None          Thank you for your consult. I will sign off. Please contact us if you have any additional questions.    Zayra Mckeon MD  Urology  [facility]

## 2018-06-30 NOTE — H&P
Hospital Medicine  History and Physical Exam    Team: [facility] E Stewart Adams MD  Admit Date: 6/29/2018  ELIEL 6/30/2018  Principal Problem:  <principal problem not specified>   Patient information was obtained from patient, spouse/SO, past medical records and ER records.   Primary care Physician: Deric Claudio MD  Code status: Full Code    HPI:   68M h/o HFrEF, MR s/p MVR (5/2018), CAD s/p CABG (5/2018), dysphagia s/p PEG, hypernatremia, anemia, SBE on rocephin / amp, presenting with hematuria after lackey was removed.    Patient with complicated recent medical hx, with prolonged 28d hospitalization 5/2018 after CABG / MVR with course complicated by SBE - started on rocephin / amp end date 7/13, dysphagia - s/p PEG, and urinary retention with lackey placement (recently removed 6/26 by urology). Pt was recently admitted to [facility] 6/23 for anemia, Hb 6.7 at that time, down from 7.0 6/20 and with dyspnea thought to be related to symptomatic anemia, pt mildly volume overloaded as well, was diuresed and transfused 2u PRBC with discharge Hb of 8.1    Per above, pt saw urology 6/26 for indwelling lackey / urinary retention, underwent voiding trial and patient passed, lackey was removed, to continue flomax and stop doxazosin.     6/28 pt began to note blood in urine, worsening to point appearing to be willa blood, presented to ED 6/29. Pt denies other sources of bleeding (no hematochezia, melena), denies worsening SOB/chest pain/palpitations, denies dysuria or difficulty urinating    Past Medical History: Patient has a past medical history of ANNA (acute kidney injury) (2/22/2018); Anemia; Anxiety; Arthritis; BPH (benign prostatic hyperplasia); CHF (congestive heart failure) (5/23/2018); Coarse tremors; Coronary artery disease of native artery of native heart with stable angina pectoris (5/21/2018); Diverticulosis; Encounter for blood transfusion; Endocarditis; Hypertension; and Urinary retention.    Past  Surgical History: Patient has a past surgical history that includes Colonoscopy; Appendectomy; Tonsillectomy; Eye surgery (Right); Fracture surgery (Right); Finger surgery; Colonoscopy (N/A, 2/1/2018); Mitral valve replacement (N/A, 5/25/2018); and creation of aortocoronary artery bypass (N/A, 5/25/2018).    Social History: Patient reports that he has never smoked. He has never used smokeless tobacco. He reports that he does not drink alcohol or use drugs.    Family History: family history includes Heart disease in his father and mother; No Known Problems in his brother and sister; Stroke in his mother.    Medications:   Prior to Admission medications    Medication Sig Start Date End Date Taking? Authorizing Provider   albuterol-ipratropium 2.5mg-0.5mg/3mL (DUO-NEB) 0.5 mg-3 mg(2.5 mg base)/3 mL nebulizer solution 3 mLs every 4 (four) hours as needed.  3/8/18  Yes Historical Provider, MD   amiodarone (PACERONE) 200 MG Tab 1 tablet (200 mg total) by Per G Tube route once daily. 6/21/18 6/21/19 Yes Asiya Fernandez NP   amLODIPine (NORVASC) 10 MG tablet 1 tablet (10 mg total) by Per G Tube route once daily. 6/21/18 6/21/19 Yes Asiya Fernandez NP   ampicillin sodium (AMPICILLIN 2 G/100 ML NS, READY TO MIX SYSTEM,) Inject 100 mLs (2 g total) into the vein every 6 (six) hours.  Patient taking differently: Inject 8 g into the vein continuous.  6/20/18 7/13/18 Yes Asiya Fernandez NP   aspirin 81 MG Chew 4 tablets (324 mg total) by Per G Tube route once daily. 6/21/18 6/21/19 Yes Asiya Fernandez NP   atorvastatin (LIPITOR) 40 MG tablet 1 tablet (40 mg total) by Per G Tube route once daily. 6/21/18 6/21/19 Yes Asiya Fernandez NP   benzonatate (TESSALON) 100 MG capsule Take 100 mg by mouth 3 (three) times daily as needed for Cough. 3/8/18  Yes Historical Provider, MD   cefTRIAXone 2 g in dextrose 5 % 50 mL (ROCEPHIN) 2 g/50 mL PgBk IVPB Inject 50 mLs (2 g total) into the vein every 12 (twelve) hours. 6/20/18 7/13/18 Yes  Asiya Fernandez NP   doxazosin (CARDURA) 4 MG tablet 1 tablet (4 mg total) by Per G Tube route once daily. 6/21/18 6/21/19 Yes Asiya Fernandez NP   furosemide (LASIX) 40 MG tablet Take 1 tablet (40 mg total) by mouth once daily. 3/6/18 3/6/19 Yes Babak Magallanes MD   hydrALAZINE (APRESOLINE) 25 MG tablet 1 tablet (25 mg total) by Per G Tube route every 8 (eight) hours. 6/20/18 6/20/19 Yes Asiya Fernandez NP   loratadine (CLARITIN) 10 mg tablet Take 10 mg by mouth once daily.   Yes Historical Provider, MD   mirtazapine (REMERON) 30 MG tablet Take 1 tablet (30 mg total) by mouth every evening. 4/26/18  Yes Deric Claudio MD   pantoprazole (PROTONIX) 40 MG tablet Take 1 tablet (40 mg total) by mouth once daily. 3/28/18 3/28/19 Yes Gloria Merritt MD   potassium chloride 10% (KAYCIEL) 20 mEq/15 mL oral solution 15 mLs (20 mEq total) by Per G Tube route 2 (two) times daily. 6/20/18  Yes Asiya Fernandez NP   VENTOLIN HFA 90 mcg/actuation inhaler  12/29/17  Yes Historical Provider, MD   warfarin (COUMADIN) 5 MG tablet 1 tablet (5 mg total) by Per G Tube route Daily. 6/20/18 6/20/19 Yes Asiya Fernandez NP       Allergies: Patient is allergic to shellfish containing products; augmentin [amoxicillin-pot clavulanate]; ciprofloxacin; flagyl [metronidazole]; lisinopril; mysoline [primidone]; and omnicef [cefdinir].    ROS    Pain Scale: 0 /10   Constitutional: no fever or chills  Respiratory: no cough or shortness of breath  Cardiovascular: no chest pain or palpitations  Gastrointestinal: no nausea or vomiting, no abdominal pain or change in bowel habits  Genitourinary: + hematuria, dysuria  Integument/Breast: no rash or pruritis  Hematologic/Lymphatic: no easy bruising or lymphadenopathy  Musculoskeletal: no arthralgias or myalgias  Neurological: no seizures or tremors  Behavioral/Psych: no depression or anxiety    PEx  Temp:  [98.6 °F (37 °C)-99.4 °F (37.4 °C)]   Pulse:  [87-96]   Resp:  [12-20]   BP:  (105-119)/(51-65)   SpO2:  [93 %-100 %]   Body mass index is 21.52 kg/m².     General appearance: no distress,  man  Mental status: Alert and oriented x 3  HEENT:  conjunctivae/corneas clear, PERRL  Neck: supple, thyroid not enlarged  Pulm:   normal respiratory effort, CTA B, no c/w/r  Card: RRR, S1, S2 normal, no murmur, click, rub or gallop  Abd: soft, NT, ND, BS present; no masses, no organomegaly, PEG tube in place  Ext: no c/c/e  Pulses: 2+, symmetric  Skin: color, texture, turgor normal. No rashes or lesions  Neuro: CN II-XII grossly intact, no focal numbness or weakness, normal strength and tone     Intake/Output Summary (Last 24 hours) at 06/29/18 1948  Last data filed at 06/29/18 1921   Gross per 24 hour   Intake             1000 ml   Output                0 ml   Net             1000 ml       Recent Labs  Lab 06/23/18  0710 06/23/18  1909 06/26/18  1304 06/29/18  1441   WBC 7.13  --  5.69 5.64   HGB 6.0* 8.1* 7.5* 6.6*   HCT 20.1*  --  26.1* 22.0*     --  165 178       Recent Labs  Lab 06/23/18  0710 06/26/18  1304 06/29/18  1441   * 160* 157*   K 4.6 5.6* 4.5   * 129* 127*   CO2 28 22* 24   BUN 55* 61* 56*   CREATININE 2.1* 2.2* 2.1*   * 121* 126*   CALCIUM 8.8 9.3 8.7       Recent Labs  Lab 06/23/18  0710 06/25/18 06/26/18  1304 06/28/18 06/29/18  1441 06/29/18  1710   ALKPHOS 67  --  81  --  87  --    ALT 36  --  63*  --  54*  --    AST 32  --  52*  --  36  --    ALBUMIN 2.3*  --  2.5*  --  2.4*  --    PROT 5.8*  --  6.5  --  5.9*  --    BILITOT 0.5  --  0.6  --  0.4  --    INR  --  2.1  --  3.1  --  4.1*        Recent Labs  Lab 06/23/18  1200   POCTGLUCOSE 125*     No results for input(s): CPK, CPKMB, MB, TROPONINI in the last 72 hours.    No results for input(s): LACTATE in the last 72 hours.     Active Hospital Problems    Diagnosis  POA    Hematuria [R31.9]  Yes      Resolved Hospital Problems    Diagnosis Date Resolved POA   No resolved problems to display.        Overview  68M h/o HFrEF, MR s/p MVR (5/2018), CAD s/p CABG (5/2018), on warfarin, dysphagia s/p PEG, hypernatremia, anemia, SBE on rocephin / amp, presenting with hematuria after lackey removal     Assessment and Plan for Problems addressed today:    Anemia w. hematuria   -Jonh hematuria after lackey removal 6/26, pt on anticoagulation with warfarin   -Per review of records, pt with longstanding h/o anemia prior to this event, with GI w/u inconclusive, last recommended VCE which has not been completed, also with h/o epistaxis (not currently issue)  -Hb 6.6 from 7.5  -Type screen, transfused 2u PRBC  -Holding warfarin for INR 4.1  -Urology consult in AM  -Recommend GI follow up    Hypovolemic hypernatremia  -Stable, has been in 150s since surgery and around time of PEG tube placement, likely patient not receiving enough free water in tube feeds  -Increase free water to 600ml Q8H, continue to monitor    CAD s/p CABG  -Stable  -Continue ASA, Statin    Chronic systolic CHF EF 35-40%  -Stable  -Cont ASA/Statin/lasix    Dysphagia s/p PEG  -Continue enteral feeds  -H2O flushes per above  -Meds crushed per G-tube    H/o A flutter  -Cont amiodarone 200mg daily    SBE  -Stable  -Continue amp / rocephin, end date 7/13    S/p MVR  -Stable  -Holding warfarin per INR 4.1  -Resume warfarin per coumadin clinic schedule as tolerated    CKD  -Stable    DVT PPx: Holding given anemia / hematuria / active bleeding  Dispo: Pending Hb stability, uro ilda Adams MD  Department of Hospital Medicine  Pager: [phone]  Spectra: [phone]

## 2018-06-30 NOTE — PLAN OF CARE
Problem: Patient Care Overview  Goal: Plan of Care Review  Patient AAOX4, has ambulated hallways, room and to bathroom with no concerns.  Patient voiding via urinal or toilet with no concerns.  PICC line is infusing and clean, dry and intact.

## 2018-06-30 NOTE — ASSESSMENT & PLAN NOTE
Previous studies c/w AOCD  Hb 6.6 on presentation, s/p 2 units overnight, AM CBC pending  Likely 2/2 hematuria, which seems to be improving  Monitor H/H

## 2018-06-30 NOTE — SUBJECTIVE & OBJECTIVE
Past Medical History:   Diagnosis Date    ANNA (acute kidney injury) 2/22/2018    Anemia     Anxiety     Arthritis     BPH (benign prostatic hyperplasia)     CHF (congestive heart failure) 5/23/2018    Coarse tremors     Coronary artery disease of native artery of native heart with stable angina pectoris 5/21/2018    Diverticulosis     Encounter for blood transfusion     Endocarditis     Hypertension     Urinary retention        Past Surgical History:   Procedure Laterality Date    APPENDECTOMY      COLONOSCOPY      COLONOSCOPY N/A 2/1/2018    Procedure: COLONOSCOPY;  Surgeon: Richar Payan MD;  Location: [facility] (4TH FLR);  Service: Endoscopy;  Laterality: N/A;  PM prep    CREATION OF AORTOCORONARY ARTERY BYPASS N/A 5/25/2018    Procedure: AORTOCORONARY BYPASS-CABG;  Surgeon: Marvin Go MD;  Location: [facility] OR 2ND FLR;  Service: Cardiovascular;  Laterality: N/A;    EYE SURGERY Right     cataract extraction    FINGER SURGERY      FRACTURE SURGERY Right     index finger    MITRAL VALVE REPLACEMENT N/A 5/25/2018    Procedure: Mitral Valve Replacement;  Surgeon: Marvin Go MD;  Location: [facility] OR [address] [place] [address];  Service: Cardiovascular;  Laterality: N/A;    TONSILLECTOMY         Review of patient's allergies indicates:   Allergen Reactions    Shellfish containing products     Augmentin [amoxicillin-pot clavulanate]      Patient felt that it raised BP and requested to have it added as intolerance. Tolerated unasyn and ampicillin.    Ciprofloxacin     Flagyl [metronidazole]     Lisinopril Other (See Comments)     cough    Mysoline [primidone]     Omnicef [cefdinir]        Family History     Problem Relation (Age of Onset)    Heart disease Mother, Father    No Known Problems Sister, Brother    Stroke Mother          Social History Main Topics    Smoking status: Never Smoker    Smokeless tobacco: Never Used    Alcohol use No      Comment: none recently    Drug use: No    Sexual  activity: Not Currently     Partners: Female       Review of Systems   Constitutional: Negative for chills and fever.   HENT: Positive for trouble swallowing.    Eyes: Negative for pain.   Gastrointestinal: Negative for abdominal pain, constipation and nausea.   Genitourinary: Positive for hematuria. Negative for difficulty urinating, dysuria, flank pain, penile pain and testicular pain.   Neurological: Positive for weakness.   Psychiatric/Behavioral: Negative for behavioral problems.       Objective:     Temp:  [96.7 °F (35.9 °C)-99.4 °F (37.4 °C)] 97.9 °F (36.6 °C)  Pulse:  [86-97] 91  Resp:  [12-20] 16  SpO2:  [93 %-100 %] 98 %  BP: ()/(47-65) 117/63     Body mass index is 21.64 kg/m².       Bladder Scan Volume (mL): 35 mL (06/29/18 2200)  Post Void Cath Residual (mL): 35 mL (06/29/18 2200)    Drains     Drain                 Gastrostomy/Enterostomy LUQ -- days         Gastrostomy/Enterostomy 06/07/18 1657 Gastrostomy-jejunostomy LUQ 22 days         Gastrostomy/Enterostomy 06/29/18 2200 Gastrostomy-jejunostomy less than 1 day                Physical Exam   Constitutional: He is oriented to person, place, and time. He appears well-developed. No distress.   HENT:   Head: Normocephalic and atraumatic.   Eyes: No scleral icterus.   Neck: No JVD present.   Cardiovascular: Normal rate and regular rhythm.    Pulmonary/Chest: No respiratory distress.   Abdominal: Soft. He exhibits no distension. There is no tenderness. There is no rebound and no guarding.   PEG tube in place   Genitourinary: Penis normal. Right testis shows no mass, no swelling and no tenderness. Left testis shows no mass, no swelling and no tenderness. Uncircumcised. No paraphimosis.   Genitourinary Comments: Concentrated brown urine in urinal    Neurological: He is alert and oriented to person, place, and time.   Skin: He is not diaphoretic. There is pallor.     Psychiatric: He has a normal mood and affect. His behavior is normal. Thought content  normal.       Significant Labs:    BMP:    Recent Labs  Lab 06/26/18  1304 06/29/18  1441   * 157*   K 5.6* 4.5   * 127*   CO2 22* 24   BUN 61* 56*   CREATININE 2.2* 2.1*   CALCIUM 9.3 8.7       CBC:    Recent Labs  Lab 06/23/18  1909 06/26/18  1304 06/29/18  1441   WBC  --  5.69 5.64   HGB 8.1* 7.5* 6.6*   HCT  --  26.1* 22.0*   PLT  --  165 178       Urine Studies:   Recent Labs  Lab 06/29/18  1702   COLORU Red*   APPEARANCEUA Cloudy*   PHUR 7.0   SPECGRAV 1.015   PROTEINUA 2+*   GLUCUA Negative   KETONESU Negative   BILIRUBINUA Negative   OCCULTUA 3+*   NITRITE Negative   UROBILINOGEN Negative   LEUKOCYTESUR Trace*   RBCUA >100*   WBCUA 0   BACTERIA Few*   SQUAMEPITHEL 4   HYALINECASTS 0       Significant Imaging:    US Retroperitoneal 2/23/18:  No solid masses or hydronephrosis. Bilateral simple cysts.

## 2018-06-30 NOTE — SUBJECTIVE & OBJECTIVE
Interval History: Received 2 units prbcs overnight. Feeling better. Still with hematuria. C/o cough     Review of Systems   Constitutional: Positive for activity change. Negative for chills, diaphoresis, fatigue, fever and unexpected weight change.   Respiratory: Positive for cough and shortness of breath. Negative for wheezing and stridor.    Cardiovascular: Negative for chest pain, palpitations and leg swelling.   Genitourinary: Positive for hematuria. Negative for flank pain, frequency and urgency.   All other systems reviewed and are negative.    Objective:     Vital Signs (Most Recent):  Temp: 97.9 °F (36.6 °C) (06/30/18 0816)  Pulse: 88 (06/30/18 1109)  Resp: 16 (06/30/18 0816)  BP: 117/63 (06/30/18 0816)  SpO2: 98 % (06/30/18 0816) Vital Signs (24h Range):  Temp:  [96.7 °F (35.9 °C)-99.4 °F (37.4 °C)] 97.9 °F (36.6 °C)  Pulse:  [86-97] 88  Resp:  [12-20] 16  SpO2:  [93 %-100 %] 98 %  BP: ()/(47-65) 117/63     Weight: 68.4 kg (150 lb 12.8 oz)  Body mass index is 21.64 kg/m².    Intake/Output Summary (Last 24 hours) at 06/30/18 1118  Last data filed at 06/29/18 2200   Gross per 24 hour   Intake             1060 ml   Output              135 ml   Net              925 ml      Physical Exam   Constitutional: He is oriented to person, place, and time. He appears well-nourished. No distress.   HENT:   Head: Normocephalic and atraumatic.   Nose: Nose normal.   Mouth/Throat: Oropharynx is clear and moist. No oropharyngeal exudate.   Eyes: Conjunctivae and EOM are normal. Pupils are equal, round, and reactive to light. Right eye exhibits no discharge. Left eye exhibits no discharge. No scleral icterus.   Neck: Neck supple. No JVD present. No tracheal deviation present. No thyromegaly present.   Cardiovascular: Normal rate, regular rhythm, normal heart sounds and intact distal pulses.  Exam reveals no friction rub.    No murmur heard.  Pulmonary/Chest: Effort normal. No stridor. No respiratory distress. He has no  wheezes. He has rales. He exhibits no tenderness.   Abdominal: Soft. Bowel sounds are normal. He exhibits no distension and no mass. There is no tenderness. There is no rebound and no guarding.   +PEG   Musculoskeletal: Normal range of motion. He exhibits no edema, tenderness or deformity.   Lymphadenopathy:     He has no cervical adenopathy.   Neurological: He is alert and oriented to person, place, and time. No cranial nerve deficit. He exhibits normal muscle tone. Coordination normal.   Skin: Skin is warm and dry. No rash noted. He is not diaphoretic. No erythema. No pallor.   Psychiatric: He has a normal mood and affect. His behavior is normal. Judgment and thought content normal.   Vitals reviewed.      Significant Labs:   A1C:   Recent Labs  Lab 01/27/18 2003 02/22/18  0615 06/29/18  1943   HGBA1C 5.4 5.4 5.0     All pertinent labs within the past 24 hours have been reviewed.    Significant Imaging: I have reviewed and interpreted all pertinent imaging results/findings within the past 24 hours.

## 2018-06-30 NOTE — ED NOTES
Assigned to care for patient at this time.  Patient has blood pRBC orders.  Patient does not appear to be in any acute distress.  Denies any CP/SOB/N/V/D at this time.  Will continue to monitor patient until admission to observation.

## 2018-06-30 NOTE — PROGRESS NOTES
[facility] Hospital Medicine  Progress Note    Patient Name: Rodolfo Messina  MRN: [id number]  Patient Class: IP- Inpatient   Admission Date: 6/29/2018  Length of Stay: 0 days  Attending Physician: Ramonita Rae MD  Primary Care Provider: Deric Claudio MD    [facility] Medicine Team: [facility]  Ramonita Rae MD    Subjective:     Principal Problem:Symptomatic anemia    HPI:  No notes on file    Hospital Course:  No notes on file    Interval History: Received 2 units prbcs overnight. Feeling better. Still with hematuria. C/o cough     Review of Systems   Constitutional: Positive for activity change. Negative for chills, diaphoresis, fatigue, fever and unexpected weight change.   Respiratory: Positive for cough and shortness of breath. Negative for wheezing and stridor.    Cardiovascular: Negative for chest pain, palpitations and leg swelling.   Genitourinary: Positive for hematuria. Negative for flank pain, frequency and urgency.   All other systems reviewed and are negative.    Objective:     Vital Signs (Most Recent):  Temp: 97.9 °F (36.6 °C) (06/30/18 0816)  Pulse: 88 (06/30/18 1109)  Resp: 16 (06/30/18 0816)  BP: 117/63 (06/30/18 0816)  SpO2: 98 % (06/30/18 0816) Vital Signs (24h Range):  Temp:  [96.7 °F (35.9 °C)-99.4 °F (37.4 °C)] 97.9 °F (36.6 °C)  Pulse:  [86-97] 88  Resp:  [12-20] 16  SpO2:  [93 %-100 %] 98 %  BP: ()/(47-65) 117/63     Weight: 68.4 kg (150 lb 12.8 oz)  Body mass index is 21.64 kg/m².    Intake/Output Summary (Last 24 hours) at 06/30/18 1118  Last data filed at 06/29/18 2200   Gross per 24 hour   Intake             1060 ml   Output              135 ml   Net              925 ml      Physical Exam   Constitutional: He is oriented to person, place, and time. He appears well-nourished. No distress.   HENT:   Head: Normocephalic and atraumatic.   Nose: Nose normal.   Mouth/Throat: Oropharynx is clear and moist. No oropharyngeal exudate.   Eyes: Conjunctivae and EOM  are normal. Pupils are equal, round, and reactive to light. Right eye exhibits no discharge. Left eye exhibits no discharge. No scleral icterus.   Neck: Neck supple. No JVD present. No tracheal deviation present. No thyromegaly present.   Cardiovascular: Normal rate, regular rhythm, normal heart sounds and intact distal pulses.  Exam reveals no friction rub.    No murmur heard.  Pulmonary/Chest: Effort normal and breath sounds normal. No stridor. No respiratory distress. He has no wheezes. He has no rales. He exhibits no tenderness.   Abdominal: Soft. Bowel sounds are normal. He exhibits no distension and no mass. There is no tenderness. There is no rebound and no guarding.   Musculoskeletal: Normal range of motion. He exhibits no edema, tenderness or deformity.   Lymphadenopathy:     He has no cervical adenopathy.   Neurological: He is alert and oriented to person, place, and time. No cranial nerve deficit. He exhibits normal muscle tone. Coordination normal.   Skin: Skin is warm and dry. No rash noted. He is not diaphoretic. No erythema. No pallor.   Psychiatric: He has a normal mood and affect. His behavior is normal. Judgment and thought content normal.   Vitals reviewed.      Significant Labs:   A1C:   Recent Labs  Lab 01/27/18  2003 02/22/18  0615 06/29/18  1943   HGBA1C 5.4 5.4 5.0     All pertinent labs within the past 24 hours have been reviewed.    Significant Imaging: I have reviewed and interpreted all pertinent imaging results/findings within the past 24 hours.    Assessment/Plan:      * Symptomatic anemia    Previous studies c/w AOCD  Hb 6.6 on presentation, s/p 2 units overnight, AM CBC pending  Likely 2/2 hematuria, which seems to be improving  Monitor H/H          Hypernatremia    2/2 free water deficit  Only receiving 300 cc q8 free water flushes via PEG at home  Increased to 600 cc q8  Monitor Na q6 - caution with overcorrection. Do not correct by more than 12 in a day          Hematuria     Urology following  Outpt cystoscopy 7/10  Seems to be improving  Monitor H/H          Supratherapeutic INR    INR > 4 on presentation  Hold Coumadin  Daily INR          Dysphagia    NPO. Resume PEG feeds.           Subacute bacterial endocarditis    Cont home ampicillin, ceftriaxone end date 7/13            VTE Risk Mitigation     None              Ramonita Rae MD  Department of Hospital Medicine   [facility]

## 2018-06-30 NOTE — ASSESSMENT & PLAN NOTE
Rodolfo Messina is a 68 y.o. male with gross hematuria following lackey removal with INR of 4.1 and symptomatic anemia    - Suspect hematuria is due to minor trauma with lackey removal in setting of supratherapeutic INR and expect that it will continue to improve with correction  - Patient has normal renal US from 2/2018 and is scheduled for cystoscopy on 7/10 which will rule out bladder tumor as source, can discuss with him at that time the risk/benefit of undergo retrograde pyelogram to evaluate the upper tracts  - Do not suspect hematuria as source of patient's persistent anemia as urine this morning appeared more concentrated than willa hematuria   - Currently emptying his bladder therefore no need for lackey catheter placement at this time  - Continue cardura 4 mg and follow up for cystoscopy

## 2018-07-01 PROBLEM — I50.23 ACUTE ON CHRONIC SYSTOLIC HEART FAILURE: Status: ACTIVE | Noted: 2018-07-01

## 2018-07-01 LAB
ALBUMIN SERPL BCP-MCNC: 2.2 G/DL
ALP SERPL-CCNC: 88 U/L
ALT SERPL W/O P-5'-P-CCNC: 41 U/L
ANION GAP SERPL CALC-SCNC: 7 MMOL/L
AST SERPL-CCNC: 25 U/L
BASOPHILS # BLD AUTO: 0.03 K/UL
BASOPHILS NFR BLD: 0.5 %
BILIRUB SERPL-MCNC: 0.6 MG/DL
BUN SERPL-MCNC: 55 MG/DL
CALCIUM SERPL-MCNC: 8.5 MG/DL
CHLORIDE SERPL-SCNC: 123 MMOL/L
CO2 SERPL-SCNC: 26 MMOL/L
CREAT SERPL-MCNC: 2 MG/DL
DIFFERENTIAL METHOD: ABNORMAL
EOSINOPHIL # BLD AUTO: 0.5 K/UL
EOSINOPHIL NFR BLD: 7.9 %
ERYTHROCYTE [DISTWIDTH] IN BLOOD BY AUTOMATED COUNT: 16.2 %
EST. GFR  (AFRICAN AMERICAN): 38.5 ML/MIN/1.73 M^2
EST. GFR  (NON AFRICAN AMERICAN): 33.3 ML/MIN/1.73 M^2
GLUCOSE SERPL-MCNC: 104 MG/DL
HCT VFR BLD AUTO: 27.7 %
HGB BLD-MCNC: 8.8 G/DL
IMM GRANULOCYTES # BLD AUTO: 0.02 K/UL
IMM GRANULOCYTES NFR BLD AUTO: 0.3 %
INR PPP: 3.1
LYMPHOCYTES # BLD AUTO: 0.9 K/UL
LYMPHOCYTES NFR BLD: 14.9 %
MCH RBC QN AUTO: 30.6 PG
MCHC RBC AUTO-ENTMCNC: 31.8 G/DL
MCV RBC AUTO: 96 FL
MONOCYTES # BLD AUTO: 0.4 K/UL
MONOCYTES NFR BLD: 7.3 %
NEUTROPHILS # BLD AUTO: 4 K/UL
NEUTROPHILS NFR BLD: 69.1 %
NRBC BLD-RTO: 0 /100 WBC
PLATELET # BLD AUTO: 185 K/UL
PMV BLD AUTO: 12.3 FL
POTASSIUM SERPL-SCNC: 4 MMOL/L
PROT SERPL-MCNC: 5.5 G/DL
PROTHROMBIN TIME: 30.2 SEC
RBC # BLD AUTO: 2.88 M/UL
SODIUM SERPL-SCNC: 155 MMOL/L
SODIUM SERPL-SCNC: 156 MMOL/L
SODIUM SERPL-SCNC: 156 MMOL/L
WBC # BLD AUTO: 5.72 K/UL

## 2018-07-01 PROCEDURE — 80053 COMPREHEN METABOLIC PANEL: CPT

## 2018-07-01 PROCEDURE — 85610 PROTHROMBIN TIME: CPT

## 2018-07-01 PROCEDURE — 97802 MEDICAL NUTRITION INDIV IN: CPT

## 2018-07-01 PROCEDURE — 99233 SBSQ HOSP IP/OBS HIGH 50: CPT | Mod: ,,, | Performed by: HOSPITALIST

## 2018-07-01 PROCEDURE — 11000001 HC ACUTE MED/SURG PRIVATE ROOM

## 2018-07-01 PROCEDURE — 63600175 PHARM REV CODE 636 W HCPCS: Performed by: HOSPITALIST

## 2018-07-01 PROCEDURE — 63600175 PHARM REV CODE 636 W HCPCS: Performed by: INTERNAL MEDICINE

## 2018-07-01 PROCEDURE — 25000003 PHARM REV CODE 250: Performed by: INTERNAL MEDICINE

## 2018-07-01 PROCEDURE — 84295 ASSAY OF SERUM SODIUM: CPT

## 2018-07-01 PROCEDURE — 25000003 PHARM REV CODE 250: Performed by: HOSPITALIST

## 2018-07-01 PROCEDURE — 85025 COMPLETE CBC W/AUTO DIFF WBC: CPT

## 2018-07-01 RX ORDER — DEXTROSE MONOHYDRATE 50 MG/ML
INJECTION, SOLUTION INTRAVENOUS CONTINUOUS
Status: DISCONTINUED | OUTPATIENT
Start: 2018-07-01 | End: 2018-07-02

## 2018-07-01 RX ADMIN — ATORVASTATIN CALCIUM 40 MG: 20 TABLET, FILM COATED ORAL at 09:07

## 2018-07-01 RX ADMIN — POTASSIUM CHLORIDE 20 MEQ: 20 SOLUTION ORAL at 10:07

## 2018-07-01 RX ADMIN — CEFTRIAXONE SODIUM 2 G: 2 INJECTION, POWDER, FOR SOLUTION INTRAMUSCULAR; INTRAVENOUS at 10:07

## 2018-07-01 RX ADMIN — AMIODARONE HYDROCHLORIDE 200 MG: 200 TABLET ORAL at 09:07

## 2018-07-01 RX ADMIN — AMPICILLIN 2 G: 2 INJECTION, POWDER, FOR SOLUTION INTRAVENOUS at 06:07

## 2018-07-01 RX ADMIN — FUROSEMIDE 80 MG: 10 INJECTION, SOLUTION INTRAMUSCULAR; INTRAVENOUS at 06:07

## 2018-07-01 RX ADMIN — TAMSULOSIN HYDROCHLORIDE 0.4 MG: 0.4 CAPSULE ORAL at 09:07

## 2018-07-01 RX ADMIN — PANTOPRAZOLE SODIUM 40 MG: 40 GRANULE, DELAYED RELEASE ORAL at 09:07

## 2018-07-01 RX ADMIN — BENZONATATE 100 MG: 100 CAPSULE ORAL at 10:07

## 2018-07-01 RX ADMIN — MIRTAZAPINE 30 MG: 15 TABLET, FILM COATED ORAL at 10:07

## 2018-07-01 RX ADMIN — AMPICILLIN 2 G: 2 INJECTION, POWDER, FOR SOLUTION INTRAVENOUS at 12:07

## 2018-07-01 RX ADMIN — CETIRIZINE HYDROCHLORIDE 5 MG: 5 TABLET, FILM COATED ORAL at 09:07

## 2018-07-01 RX ADMIN — POTASSIUM CHLORIDE 20 MEQ: 20 SOLUTION ORAL at 09:07

## 2018-07-01 RX ADMIN — HYDRALAZINE HYDROCHLORIDE 25 MG: 25 TABLET, FILM COATED ORAL at 10:07

## 2018-07-01 RX ADMIN — FUROSEMIDE 80 MG: 10 INJECTION, SOLUTION INTRAMUSCULAR; INTRAVENOUS at 10:07

## 2018-07-01 RX ADMIN — AMLODIPINE BESYLATE 10 MG: 10 TABLET ORAL at 09:07

## 2018-07-01 RX ADMIN — AMPICILLIN 2 G: 2 INJECTION, POWDER, FOR SOLUTION INTRAVENOUS at 05:07

## 2018-07-01 RX ADMIN — ASPIRIN 325 MG ORAL TABLET 325 MG: 325 PILL ORAL at 09:07

## 2018-07-01 RX ADMIN — CEFTRIAXONE SODIUM 2 G: 2 INJECTION, POWDER, FOR SOLUTION INTRAMUSCULAR; INTRAVENOUS at 09:07

## 2018-07-01 RX ADMIN — DEXTROSE MONOHYDRATE: 5 INJECTION, SOLUTION INTRAVENOUS at 12:07

## 2018-07-01 RX ADMIN — AMPICILLIN 2 G: 2 INJECTION, POWDER, FOR SOLUTION INTRAVENOUS at 01:07

## 2018-07-01 NOTE — PLAN OF CARE
Problem: Patient Care Overview  Goal: Plan of Care Review  Outcome: Ongoing (interventions implemented as appropriate)  Recommendations     1. Continue current home TF regimen of Isosource 1.5 @ 50 mL/hr. Meets 97% EEN, 100% EPN.               - Hold for residuals >500 mL.   2. RD to monitor & follow-up.

## 2018-07-01 NOTE — SUBJECTIVE & OBJECTIVE
Interval History: No complaints. Feels better. H/H stable. Na slowly improving. Diuresing well. Hematuria unchanged. Requesting swallowing therapy, is ready to be done with the PEG.    Review of Systems   Constitutional: Positive for activity change. Negative for chills, diaphoresis, fatigue, fever and unexpected weight change.   Respiratory: Positive for cough and shortness of breath. Negative for wheezing and stridor.    Cardiovascular: Negative for chest pain, palpitations and leg swelling.   Genitourinary: Positive for hematuria. Negative for flank pain, frequency and urgency.   All other systems reviewed and are negative.    Objective:     Vital Signs (Most Recent):  Temp: 98.3 °F (36.8 °C) (07/01/18 0741)  Pulse: 90 (07/01/18 1124)  Resp: 16 (07/01/18 0741)  BP: 118/66 (07/01/18 0741)  SpO2: 95 % (07/01/18 0741) Vital Signs (24h Range):  Temp:  [97.9 °F (36.6 °C)-98.9 °F (37.2 °C)] 98.3 °F (36.8 °C)  Pulse:  [] 90  Resp:  [16] 16  SpO2:  [90 %-96 %] 95 %  BP: ()/(53-66) 118/66     Weight: 71.3 kg (157 lb 3 oz)  Body mass index is 22.55 kg/m².    Intake/Output Summary (Last 24 hours) at 07/01/18 1301  Last data filed at 07/01/18 1200   Gross per 24 hour   Intake             1290 ml   Output             3350 ml   Net            -2060 ml      Physical Exam   Constitutional: He is oriented to person, place, and time. He appears well-nourished. No distress.   HENT:   Head: Normocephalic and atraumatic.   Nose: Nose normal.   Mouth/Throat: Oropharynx is clear and moist. No oropharyngeal exudate.   Eyes: Conjunctivae and EOM are normal. Pupils are equal, round, and reactive to light. Right eye exhibits no discharge. Left eye exhibits no discharge. No scleral icterus.   Neck: Neck supple. No JVD present. No tracheal deviation present. No thyromegaly present.   Cardiovascular: Normal rate, regular rhythm, normal heart sounds and intact distal pulses.  Exam reveals no friction rub.    No murmur  heard.  Pulmonary/Chest: Effort normal. No stridor. No respiratory distress. He has no wheezes. He has rales. He exhibits no tenderness.   Abdominal: Soft. Bowel sounds are normal. He exhibits no distension and no mass. There is no tenderness. There is no rebound and no guarding.   +PEG   Musculoskeletal: Normal range of motion. He exhibits no edema, tenderness or deformity.   Lymphadenopathy:     He has no cervical adenopathy.   Neurological: He is alert and oriented to person, place, and time. No cranial nerve deficit. He exhibits normal muscle tone. Coordination normal.   Skin: Skin is warm and dry. No rash noted. He is not diaphoretic. No erythema. No pallor.   Psychiatric: He has a normal mood and affect. His behavior is normal. Judgment and thought content normal.   Vitals reviewed.      Significant Labs:   A1C:     Recent Labs  Lab 01/27/18 2003 02/22/18  0615 06/29/18  1943   HGBA1C 5.4 5.4 5.0     All pertinent labs within the past 24 hours have been reviewed.    Significant Imaging: I have reviewed and interpreted all pertinent imaging results/findings within the past 24 hours.

## 2018-07-01 NOTE — PROGRESS NOTES
"  [facility]  Adult Nutrition  Consult Note    SUMMARY     Recommendations    1. Continue current home TF regimen of Isosource 1.5 @ 50 mL/hr. Meets 97% EEN, 100% EPN.    - Hold for residuals >500 mL.   2. RD to monitor & follow-up.    Goals: Meet % EEN, EPN  Nutrition Goal Status: new  Communication of RD Recs: reviewed with RN    Reason for Assessment    Reason for Assessment: new tube feeding  Diagnosis: other (see comments) (Anemia)  Relevant Medical History: HTN, CHF, PEG placement  Interdisciplinary Rounds: did not attend    General Information Comments: Pt tolerating current TF regimen via PEG. Remains NPO.  Nutrition Discharge Planning: Tolerance of TF    Nutrition/Diet History    Patient Reported Diet/Restrictions/Preferences: no oral intake, other (see comments) (Isosource 1.5 @ 50 mL/hr x 24 hours/day.)  Do you have any cultural, spiritual, Mosque conflicts, given your current situation?: Yarsanism  Factors Affecting Nutritional Intake: NPO    Anthropometrics    Temp: 98.3 °F (36.8 °C)  Height Method: Stated  Height: 5' 10" (177.8 cm)  Height (inches): 70 in  Weight Method: Bed Scale  Weight: 71.3 kg (157 lb 3 oz)  Weight (lb): 157.19 lb  Ideal Body Weight (IBW), Male: 166 lb  % Ideal Body Weight, Male (lb): 94.69 lb  BMI (Calculated): 22.6  BMI Grade: 18.5-24.9 - normal  Usual Body Weight (UBW), k kg  % Usual Body Weight: 108.26  % Weight Change From Usual Weight: 8.03 %    Lab/Procedures/Meds    Pertinent Labs Reviewed: reviewed  Pertinent Labs Comments: Na 156, BUN 55, Creat 2, GFR 33.3  Pertinent Medications Reviewed: reviewed    Physical Findings/Assessment    Overall Physical Appearance: underweight, loss of muscle mass  Tubes: gastrostomy tube  Oral/Mouth Cavity: WDL  Skin: intact    Estimated/Assessed Needs    Weight Used For Calorie Calculations: 71.3 kg (157 lb 3 oz)     Energy Calorie Requirements (kcal): 1861 kcal/d  Energy Need Method: [place]-St Jeor (1.25 " PAL)     Protein Requirements: 71-86 g/d (1-1.2 g/kg)  Weight Used For Protein Calculations: 71.3 kg (157 lb 3 oz)     Fluid Need Method: other (see comments) (Per MD or 1 mL/kcal)    Nutrition Prescription Ordered    Current Diet Order: NPO  Current Nutrition Support Formula Ordered: Isosource 1.5  Current Nutrition Support Rate Ordered: 50 mL/hr    Evaluation of Received Nutrient/Fluid Intake    Enteral Calories (kcal): 1800  Enteral Protein (gm): 82  Enteral (Free Water) Fluid (mL): 917    Free Water Flush Fluid (mL): 1800    % Kcal Needs: 97%  % Protein Needs: 100%    Energy Calories Required: meeting needs  Protein Required: meeting needs  Fluid Required: other (see comments) (Per MD or 1 mL/kcal)    Comments: LBM: 6/29    Tolerance: tolerating    Nutrition Risk    Level of Risk/Frequency of Follow-up:  (1x/week)     Assessment and Plan    Nutrition Problem  Dysphagia     Related to (etiology):   NPO status    Signs and Symptoms (as evidenced by):   Primary nutrition via PEG    Nutrition Diagnosis Status:   New     Monitor and Evaluation    Food and Nutrient Intake: enteral nutrition intake  Food and Nutrient Adminstration: enteral and parenteral nutrition administration  Physical Activity and Function: nutrition-related ADLs and IADLs  Anthropometric Measurements: weight, weight change  Biochemical Data, Medical Tests and Procedures: lipid profile, inflammatory profile, electrolyte and renal panel, gastrointestinal profile, glucose/endocrine profile  Nutrition-Focused Physical Findings: overall appearance     Nutrition Follow-Up    RD Follow-up?: Yes

## 2018-07-01 NOTE — ASSESSMENT & PLAN NOTE
2/2 free water deficit  Only receiving 300 cc q8 free water flushes via PEG at home  Increased to 600 cc q8  Monitor Na q12 - caution with overcorrection. Do not correct by more than 12 in a day  Slowly improving. Start D5 gtt for more free water.

## 2018-07-01 NOTE — ASSESSMENT & PLAN NOTE
Received IV diuresis. Transition back to PO Lasix.   Fluid restrict, low Na, strict I&Os, daily weights

## 2018-07-01 NOTE — PROGRESS NOTES
[facility] Hospital Medicine  Progress Note    Patient Name: Rodolfo Messina  MRN: [id number]  Patient Class: IP- Inpatient   Admission Date: 6/29/2018  Length of Stay: 1 days  Attending Physician: Ramonita Rae MD  Primary Care Provider: Deric Claudio MD    [facility] Medicine Team: [facility] Ramonita Rae MD    Subjective:     Principal Problem:Symptomatic anemia    HPI:  No notes on file    Hospital Course:  No notes on file    Interval History: No complaints. Feels better. H/H stable. Na slowly improving. Diuresing well. Hematuria unchanged. Requesting swallowing therapy, is ready to be done with the PEG.    Review of Systems   Constitutional: Positive for activity change. Negative for chills, diaphoresis, fatigue, fever and unexpected weight change.   Respiratory: Positive for cough and shortness of breath. Negative for wheezing and stridor.    Cardiovascular: Negative for chest pain, palpitations and leg swelling.   Genitourinary: Positive for hematuria. Negative for flank pain, frequency and urgency.   All other systems reviewed and are negative.    Objective:     Vital Signs (Most Recent):  Temp: 98.3 °F (36.8 °C) (07/01/18 0741)  Pulse: 90 (07/01/18 1124)  Resp: 16 (07/01/18 0741)  BP: 118/66 (07/01/18 0741)  SpO2: 95 % (07/01/18 0741) Vital Signs (24h Range):  Temp:  [97.9 °F (36.6 °C)-98.9 °F (37.2 °C)] 98.3 °F (36.8 °C)  Pulse:  [] 90  Resp:  [16] 16  SpO2:  [90 %-96 %] 95 %  BP: ()/(53-66) 118/66     Weight: 71.3 kg (157 lb 3 oz)  Body mass index is 22.55 kg/m².    Intake/Output Summary (Last 24 hours) at 07/01/18 1301  Last data filed at 07/01/18 1200   Gross per 24 hour   Intake             1290 ml   Output             3350 ml   Net            -2060 ml      Physical Exam   Constitutional: He is oriented to person, place, and time. He appears well-nourished. No distress.   HENT:   Head: Normocephalic and atraumatic.   Nose: Nose normal.   Mouth/Throat: Oropharynx  is clear and moist. No oropharyngeal exudate.   Eyes: Conjunctivae and EOM are normal. Pupils are equal, round, and reactive to light. Right eye exhibits no discharge. Left eye exhibits no discharge. No scleral icterus.   Neck: Neck supple. No JVD present. No tracheal deviation present. No thyromegaly present.   Cardiovascular: Normal rate, regular rhythm, normal heart sounds and intact distal pulses.  Exam reveals no friction rub.    No murmur heard.  Pulmonary/Chest: Effort normal. No stridor. No respiratory distress. He has no wheezes. He has rales. He exhibits no tenderness.   Abdominal: Soft. Bowel sounds are normal. He exhibits no distension and no mass. There is no tenderness. There is no rebound and no guarding.   +PEG   Musculoskeletal: Normal range of motion. He exhibits no edema, tenderness or deformity.   Lymphadenopathy:     He has no cervical adenopathy.   Neurological: He is alert and oriented to person, place, and time. No cranial nerve deficit. He exhibits normal muscle tone. Coordination normal.   Skin: Skin is warm and dry. No rash noted. He is not diaphoretic. No erythema. No pallor.   Psychiatric: He has a normal mood and affect. His behavior is normal. Judgment and thought content normal.   Vitals reviewed.      Significant Labs:   A1C:     Recent Labs  Lab 01/27/18  2003 02/22/18  0615 06/29/18  1943   HGBA1C 5.4 5.4 5.0     All pertinent labs within the past 24 hours have been reviewed.    Significant Imaging: I have reviewed and interpreted all pertinent imaging results/findings within the past 24 hours.    Assessment/Plan:      * Symptomatic anemia    Previous studies c/w AOCD  Hb 6.6 on presentation, s/p 2 units, H/H stable  Likely 2/2 hematuria, which is stable  Monitor H/H          Hypernatremia    2/2 free water deficit  Only receiving 300 cc q8 free water flushes via PEG at home  Increased to 600 cc q8  Monitor Na q12 - caution with overcorrection. Do not correct by more than 12 in a  day  Slowly improving. Start D5 gtt for more free water.          Acute on chronic systolic heart failure    IV diuresis  Fluid restrict, low Na, strict I&Os, daily weights          Hematuria    Urology following  Outpt cystoscopy 7/10  Stable  Monitor H/H          Supratherapeutic INR    INR > 4 on presentation  Per previous notes, goal INR 1.5-2 due to being prone to bleeding  Hold Coumadin  Daily INR, 3.1 today          Dysphagia    NPO. Resume PEG feeds.   ST consult placed          Subacute bacterial endocarditis    No acute issues  Cont home ampicillin, ceftriaxone end date 7/13            VTE Risk Mitigation     None              Ramonita Rae MD  Department of Hospital Medicine   [facility]

## 2018-07-01 NOTE — ASSESSMENT & PLAN NOTE
Previous studies c/w AOCD  Hb 6.6 on presentation, s/p 2 units, H/H stable  Likely 2/2 hematuria, which is stable  Monitor H/H

## 2018-07-01 NOTE — ASSESSMENT & PLAN NOTE
INR > 4 on presentation  Per previous notes, goal INR 1.5-2 due to being prone to bleeding  Hold Coumadin  Daily INR, 3.1 today

## 2018-07-02 LAB
ALBUMIN SERPL BCP-MCNC: 2.2 G/DL
ALP SERPL-CCNC: 87 U/L
ALT SERPL W/O P-5'-P-CCNC: 32 U/L
ANION GAP SERPL CALC-SCNC: 8 MMOL/L
ANION GAP SERPL CALC-SCNC: 8 MMOL/L
AST SERPL-CCNC: 17 U/L
BACTERIA #/AREA URNS AUTO: ABNORMAL /HPF
BASOPHILS # BLD AUTO: 0.03 K/UL
BASOPHILS # BLD AUTO: 0.03 K/UL
BASOPHILS NFR BLD: 0.5 %
BASOPHILS NFR BLD: 0.5 %
BILIRUB SERPL-MCNC: 0.5 MG/DL
BILIRUB UR QL STRIP: NEGATIVE
BUN SERPL-MCNC: 52 MG/DL
BUN SERPL-MCNC: 53 MG/DL
CALCIUM SERPL-MCNC: 8 MG/DL
CALCIUM SERPL-MCNC: 8.2 MG/DL
CHLORIDE SERPL-SCNC: 111 MMOL/L
CHLORIDE SERPL-SCNC: 113 MMOL/L
CLARITY UR REFRACT.AUTO: ABNORMAL
CO2 SERPL-SCNC: 25 MMOL/L
CO2 SERPL-SCNC: 26 MMOL/L
COLOR UR AUTO: YELLOW
CREAT SERPL-MCNC: 2 MG/DL
CREAT SERPL-MCNC: 2 MG/DL
DIFFERENTIAL METHOD: ABNORMAL
DIFFERENTIAL METHOD: ABNORMAL
EOSINOPHIL # BLD AUTO: 0.4 K/UL
EOSINOPHIL # BLD AUTO: 0.5 K/UL
EOSINOPHIL NFR BLD: 7 %
EOSINOPHIL NFR BLD: 7.4 %
ERYTHROCYTE [DISTWIDTH] IN BLOOD BY AUTOMATED COUNT: 15.9 %
ERYTHROCYTE [DISTWIDTH] IN BLOOD BY AUTOMATED COUNT: 16 %
EST. GFR  (AFRICAN AMERICAN): 38.5 ML/MIN/1.73 M^2
EST. GFR  (AFRICAN AMERICAN): 38.5 ML/MIN/1.73 M^2
EST. GFR  (NON AFRICAN AMERICAN): 33.3 ML/MIN/1.73 M^2
EST. GFR  (NON AFRICAN AMERICAN): 33.3 ML/MIN/1.73 M^2
GLUCOSE SERPL-MCNC: 103 MG/DL
GLUCOSE SERPL-MCNC: 320 MG/DL
GLUCOSE UR QL STRIP: NEGATIVE
HCT VFR BLD AUTO: 26.6 %
HCT VFR BLD AUTO: 27.3 %
HGB BLD-MCNC: 8.1 G/DL
HGB BLD-MCNC: 8.7 G/DL
HGB UR QL STRIP: ABNORMAL
HYALINE CASTS UR QL AUTO: 0 /LPF
IMM GRANULOCYTES # BLD AUTO: 0.02 K/UL
IMM GRANULOCYTES # BLD AUTO: 0.03 K/UL
IMM GRANULOCYTES NFR BLD AUTO: 0.3 %
IMM GRANULOCYTES NFR BLD AUTO: 0.5 %
INR PPP: 2.3
KETONES UR QL STRIP: NEGATIVE
LEUKOCYTE ESTERASE UR QL STRIP: NEGATIVE
LYMPHOCYTES # BLD AUTO: 0.7 K/UL
LYMPHOCYTES # BLD AUTO: 0.9 K/UL
LYMPHOCYTES NFR BLD: 11.9 %
LYMPHOCYTES NFR BLD: 14.1 %
MCH RBC QN AUTO: 29.7 PG
MCH RBC QN AUTO: 30.6 PG
MCHC RBC AUTO-ENTMCNC: 30.5 G/DL
MCHC RBC AUTO-ENTMCNC: 31.9 G/DL
MCV RBC AUTO: 96 FL
MCV RBC AUTO: 97 FL
MICROSCOPIC COMMENT: ABNORMAL
MONOCYTES # BLD AUTO: 0.4 K/UL
MONOCYTES # BLD AUTO: 0.5 K/UL
MONOCYTES NFR BLD: 7.1 %
MONOCYTES NFR BLD: 7.5 %
NEUTROPHILS # BLD AUTO: 4.3 K/UL
NEUTROPHILS # BLD AUTO: 4.4 K/UL
NEUTROPHILS NFR BLD: 70.4 %
NEUTROPHILS NFR BLD: 72.8 %
NITRITE UR QL STRIP: NEGATIVE
NRBC BLD-RTO: 0 /100 WBC
NRBC BLD-RTO: 0 /100 WBC
PH UR STRIP: 6 [PH] (ref 5–8)
PLATELET # BLD AUTO: 182 K/UL
PLATELET # BLD AUTO: 185 K/UL
PMV BLD AUTO: 12.6 FL
PMV BLD AUTO: 13.5 FL
POCT GLUCOSE: 110 MG/DL (ref 70–110)
POCT GLUCOSE: 130 MG/DL (ref 70–110)
POCT GLUCOSE: 135 MG/DL (ref 70–110)
POTASSIUM SERPL-SCNC: 3.7 MMOL/L
POTASSIUM SERPL-SCNC: 4 MMOL/L
PROT SERPL-MCNC: 5.3 G/DL
PROT UR QL STRIP: ABNORMAL
PROTHROMBIN TIME: 22.7 SEC
RBC # BLD AUTO: 2.73 M/UL
RBC # BLD AUTO: 2.84 M/UL
RBC #/AREA URNS AUTO: >100 /HPF (ref 0–4)
SODIUM SERPL-SCNC: 144 MMOL/L
SODIUM SERPL-SCNC: 144 MMOL/L
SODIUM SERPL-SCNC: 147 MMOL/L
SP GR UR STRIP: 1.01 (ref 1–1.03)
URN SPEC COLLECT METH UR: ABNORMAL
UROBILINOGEN UR STRIP-ACNC: NEGATIVE EU/DL
WBC # BLD AUTO: 6.05 K/UL
WBC # BLD AUTO: 6.15 K/UL
WBC #/AREA URNS AUTO: 72 /HPF (ref 0–5)

## 2018-07-02 PROCEDURE — 92610 EVALUATE SWALLOWING FUNCTION: CPT

## 2018-07-02 PROCEDURE — 63600175 PHARM REV CODE 636 W HCPCS: Performed by: INTERNAL MEDICINE

## 2018-07-02 PROCEDURE — 85610 PROTHROMBIN TIME: CPT

## 2018-07-02 PROCEDURE — 63600175 PHARM REV CODE 636 W HCPCS: Performed by: HOSPITALIST

## 2018-07-02 PROCEDURE — 25000003 PHARM REV CODE 250: Performed by: INTERNAL MEDICINE

## 2018-07-02 PROCEDURE — 99233 SBSQ HOSP IP/OBS HIGH 50: CPT | Mod: ,,, | Performed by: HOSPITALIST

## 2018-07-02 PROCEDURE — 81001 URINALYSIS AUTO W/SCOPE: CPT

## 2018-07-02 PROCEDURE — 80053 COMPREHEN METABOLIC PANEL: CPT

## 2018-07-02 PROCEDURE — 85025 COMPLETE CBC W/AUTO DIFF WBC: CPT

## 2018-07-02 PROCEDURE — 80048 BASIC METABOLIC PNL TOTAL CA: CPT

## 2018-07-02 PROCEDURE — 11000001 HC ACUTE MED/SURG PRIVATE ROOM

## 2018-07-02 RX ORDER — INSULIN ASPART 100 [IU]/ML
0-5 INJECTION, SOLUTION INTRAVENOUS; SUBCUTANEOUS
Status: DISCONTINUED | OUTPATIENT
Start: 2018-07-02 | End: 2018-07-13 | Stop reason: HOSPADM

## 2018-07-02 RX ORDER — IBUPROFEN 200 MG
16 TABLET ORAL
Status: DISCONTINUED | OUTPATIENT
Start: 2018-07-02 | End: 2018-07-13 | Stop reason: HOSPADM

## 2018-07-02 RX ORDER — GLUCAGON 1 MG
1 KIT INJECTION
Status: DISCONTINUED | OUTPATIENT
Start: 2018-07-02 | End: 2018-07-13 | Stop reason: HOSPADM

## 2018-07-02 RX ORDER — FUROSEMIDE 40 MG/1
40 TABLET ORAL DAILY
Status: DISCONTINUED | OUTPATIENT
Start: 2018-07-02 | End: 2018-07-13 | Stop reason: HOSPADM

## 2018-07-02 RX ORDER — IBUPROFEN 200 MG
24 TABLET ORAL
Status: DISCONTINUED | OUTPATIENT
Start: 2018-07-02 | End: 2018-07-13 | Stop reason: HOSPADM

## 2018-07-02 RX ADMIN — POTASSIUM CHLORIDE 20 MEQ: 20 SOLUTION ORAL at 08:07

## 2018-07-02 RX ADMIN — PANTOPRAZOLE SODIUM 40 MG: 40 GRANULE, DELAYED RELEASE ORAL at 08:07

## 2018-07-02 RX ADMIN — HYDRALAZINE HYDROCHLORIDE 25 MG: 25 TABLET, FILM COATED ORAL at 06:07

## 2018-07-02 RX ADMIN — AMPICILLIN 2 G: 2 INJECTION, POWDER, FOR SOLUTION INTRAVENOUS at 12:07

## 2018-07-02 RX ADMIN — FUROSEMIDE 80 MG: 10 INJECTION, SOLUTION INTRAMUSCULAR; INTRAVENOUS at 08:07

## 2018-07-02 RX ADMIN — AMPICILLIN 2 G: 2 INJECTION, POWDER, FOR SOLUTION INTRAVENOUS at 11:07

## 2018-07-02 RX ADMIN — BENZONATATE 100 MG: 100 CAPSULE ORAL at 12:07

## 2018-07-02 RX ADMIN — CEFTRIAXONE SODIUM 2 G: 2 INJECTION, POWDER, FOR SOLUTION INTRAMUSCULAR; INTRAVENOUS at 08:07

## 2018-07-02 RX ADMIN — AMIODARONE HYDROCHLORIDE 200 MG: 200 TABLET ORAL at 08:07

## 2018-07-02 RX ADMIN — AMPICILLIN 2 G: 2 INJECTION, POWDER, FOR SOLUTION INTRAVENOUS at 05:07

## 2018-07-02 RX ADMIN — ASPIRIN 325 MG ORAL TABLET 325 MG: 325 PILL ORAL at 08:07

## 2018-07-02 RX ADMIN — ATORVASTATIN CALCIUM 40 MG: 20 TABLET, FILM COATED ORAL at 08:07

## 2018-07-02 RX ADMIN — MIRTAZAPINE 30 MG: 15 TABLET, FILM COATED ORAL at 08:07

## 2018-07-02 RX ADMIN — AMLODIPINE BESYLATE 10 MG: 10 TABLET ORAL at 08:07

## 2018-07-02 RX ADMIN — TAMSULOSIN HYDROCHLORIDE 0.4 MG: 0.4 CAPSULE ORAL at 09:07

## 2018-07-02 RX ADMIN — AMPICILLIN 2 G: 2 INJECTION, POWDER, FOR SOLUTION INTRAVENOUS at 06:07

## 2018-07-02 RX ADMIN — CETIRIZINE HYDROCHLORIDE 5 MG: 5 TABLET, FILM COATED ORAL at 08:07

## 2018-07-02 NOTE — ASSESSMENT & PLAN NOTE
2/2 free water deficit  Only receiving 300 cc q8 free water flushes via PEG at home  Increased to 600 cc q8  Monitor Na q12 - caution with overcorrection. Do not correct by more than 12 in a day  Slowly improving. Received D5 gtt. Monitor Na's off gtt.

## 2018-07-02 NOTE — PLAN OF CARE
CM met with patient and spouse for discharge planning.  Patient is current with [facility] for nurse, PT, ST and spouse says OT discharged patient already.  Plan is to discharge home with continuing [facility].    Pharmacy:    [facility] - [place], [place] - [address]  [address] [facility] [address]  Phone: [phone] Fax: [phone]    [id number]  [place], [place] - [address] include 234 beds at the [facility]  [address] include 234 beds at the [facility]  [place] [address]  Phone: [phone] Fax: [phone]    [id number] [place] [place] - [address] [address]  Phone: [phone] Fax: [phone]    PCP:  Deric Claudio MD    Payor: [employer] MANAGED MEDICARE / Plan: [employer] MEDICARE HMO / Product Type: Capitation /      07/02/18 1119   Discharge Assessment   Assessment Type Discharge Planning Assessment   Confirmed/corrected address and phone number on facesheet? Yes   Assessment information obtained from? Patient;Caregiver   Expected Length of Stay (days) 3   Communicated expected length of stay with patient/caregiver yes   Prior to hospitilization cognitive status: Alert/Oriented   Prior to hospitalization functional status: Independent   Current cognitive status: Alert/Oriented   Current Functional Status: Independent   Facility Arrived From: Emergency room   Lives With spouse;grandchild(devon)   Able to Return to Prior Arrangements yes   Is patient able to care for self after discharge? Unable to determine at this time (comments)   Who are your caregiver(s) and their phone number(s)? Lupe Messina - spouse [phone]   Patient's perception of discharge disposition home health   Readmission Within The Last 30 Days current reason for admission unrelated to previous admission   If yes, most recent facility name: [facility]   Patient currently being followed by outpatient case management? No   Patient currently receives any other outside agency services? No    Equipment Currently Used at Home bath bench   Do you have any problems affording any of your prescribed medications? No   Is the patient taking medications as prescribed? yes   Does the patient have transportation home? Yes   Transportation Available family or friend will provide   Does the patient receive services at the [facility]? Yes   Discharge Plan A Home Health   Discharge Plan B Skilled Nursing Facility   Patient/Family In Agreement With Plan yes   Readmission Questionnaire   At the time of your discharge, did someone talk to you about what your health problems were? Yes   At the time of discharge, did someone talk to you about what to watch out for regarding worsening of your health problem? Yes   At the time of discharge, did someone talk to you about what to do if you experienced worsening of your health problem? Yes   At the time of discharge, did someone talk to you about which medication to take when you left the hospital and which ones to stop taking? Yes   At the time of discharge, did someone talk to you about when and where to follow up with a doctor after you left the hospital? Yes   What do you believe caused you to be sick enough to be re-admitted? PEG tube blocked   How often do you need to have someone help you when you read instructions, pamphlets, or other written material from your doctor or pharmacy? Never   Do you have problems taking your medications as prescribed? No   Do you have any problems affording any of  your prescribed medications? No   Do you have problems obtaining/receiving your medications? No   Living Arrangements house   Does the patient have family/friends to help with healtcare needs after discharge? yes   Does your caregiver provide all the help you need? No   If no, what kind of help do you need at home? Home Health nurse, PT, ST   Are you currently feeling confused? No   Are you currently having problems thinking? No   Are you currently having memory problems?  No   Have you felt down, depressed, or hopeless? 0   Have you felt little interest or pleasure in doing things? 0

## 2018-07-02 NOTE — PT/OT/SLP EVAL
Speech Language Pathology Evaluation  Bedside Swallow    Patient Name:  Rodolfo Messina   MRN:  [id number]  Admitting Diagnosis: Symptomatic anemia    Recommendations:                 General Recommendations:  Dysphagia therapy and Modified barium swallow study  Diet recommendations:  NPO, Pleasure Feeding, Puree, Honey Thick, NPO, Other (see comments) (pleasure feeding)   Aspiration Precautions: Multiple effortful swallows per bite/sip, Puree for pleasure and Small bites/sips   General Precautions: Standard, aspiration, fall, NPO  Communication strategies:  none    History:     Past Medical History:   Diagnosis Date    ANNA (acute kidney injury) 2/22/2018    Anemia     Anxiety     Arthritis     BPH (benign prostatic hyperplasia)     CHF (congestive heart failure) 5/23/2018    Coarse tremors     Coronary artery disease of native artery of native heart with stable angina pectoris 5/21/2018    Diverticulosis     Encounter for blood transfusion     Endocarditis     Hypertension     Urinary retention        Past Surgical History:   Procedure Laterality Date    APPENDECTOMY      COLONOSCOPY      COLONOSCOPY N/A 2/1/2018    Procedure: COLONOSCOPY;  Surgeon: Richar Payan MD;  Location: [address]);  Service: Endoscopy;  Laterality: N/A;  PM prep    CREATION OF AORTOCORONARY ARTERY BYPASS N/A 5/25/2018    Procedure: AORTOCORONARY BYPASS-CABG;  Surgeon: Marvin Go MD;  Location: [address];  Service: Cardiovascular;  Laterality: N/A;    EYE SURGERY Right     cataract extraction    FINGER SURGERY      FRACTURE SURGERY Right     index finger    MITRAL VALVE REPLACEMENT N/A 5/25/2018    Procedure: Mitral Valve Replacement;  Surgeon: Marvin Go MD;  Location: [address];  Service: Cardiovascular;  Laterality: N/A;    TONSILLECTOMY         Social History: Patient lives with his wife.    Prior Intubation HX:  None this admission    Modified Barium Swallow: 6/5/18:  Patient  "demonstrates moderate Oropharyngeal dysphagia. Pt with lack of strength/endurance to safely consume adequate nutrition/hydration while following necessary compensatory strategies at this time.     Chest X-Rays: 6/30/18:  Bilateral small pleural effusions and mild interstitial pulmonary edema.  Basilar edema has increased since prior.    Prior diet: primary nutrition/hydration via PEG w/ pleasure feeding of puree and honey thick liquids    Occupation/hobbies/homemaking: none stated.    Subjective     "We have to get him swallowing again!  He;s so much stronger now"  Pt's wife stated  Patient goals: to resume po intake     Pain/Comfort:  · Pain Rating 1: 0/10  · Pain Rating Post-Intervention 1: 0/10    Objective:     Oral Musculature Evaluation  · Oral Musculature: WFL  · Dentition: present and adequate  · Secretion Management: adequate  · Mucosal Quality: adequate  · Mandibular Strength and Mobility: WFL  · Oral Labial Strength and Mobility: WFL  · Lingual Strength and Mobility: WFL  · Buccal Strength and Mobility: WFL  · Volitional Cough: adequate  · Volitional Swallow: timely  · Voice Prior to PO Intake: clear    Bedside Swallow Eval:   Consistencies Assessed:  · Honey thick liquids tsp sips  · Puree tsp bites x3     Oral Phase:   · Slow oral transit time    Pharyngeal Phase:   · decreased hyolaryngeal excursion to palpation  · delayed swallow initation  · throat clearing following honey and puree trials    Compensatory Strategies  · multiple effortful swallows per bite/sip    Treatment: Education was provided to pt and spouse re: SLP role, eval results, MBSS procedure, pleasure feeding recs, aspiration precautions, and SLP POC.    Assessment:     Rodolfo Messina is a 68 y.o. male with an SLP diagnosis of Dysphagia.  He presents with ongoing moderate oropharyngeal dysphagia.  He would benefit from repeat MBSS to determine current level of function and ability to advance po intake safely.    Goals:    SLP Goals  "       Problem: SLP Goal    Goal Priority Disciplines Outcome   SLP Goal     SLP Ongoing (interventions implemented as appropriate)   Description:  Speech Language Pathology Goals  Goals expected to be met by 7/9/18:    1.  Pt will participate in MBSS to determine the need for additional goals and poc.                        Plan:     · Patient to be seen:  3 x/week   · Plan of Care expires:  07/31/18  · Plan of Care reviewed with:  patient, spouse   · SLP Follow-Up:  Yes       Discharge recommendations:  home, home health speech therapy   Barriers to Discharge:  None    Time Tracking:     SLP Treatment Date:   07/02/18  Speech Start Time:  0933  Speech Stop Time:  0951     Speech Total Time (min):  18 min    Billable Minutes: Eval Swallow and Oral Function 18    Leyda Chopra CCC-SLP  07/02/2018

## 2018-07-02 NOTE — ASSESSMENT & PLAN NOTE
S/p MVR on coumadin   INR > 4 on presentation  Per previous notes, goal INR 1.5-2 due to being prone to bleeding  Hold Coumadin, will eventually plan to resume at 2.5 mg daily  Daily INR, 2.3 today

## 2018-07-02 NOTE — PLAN OF CARE
Problem: Patient Care Overview  Goal: Plan of Care Review  Outcome: Ongoing (interventions implemented as appropriate)  Pt AO x4. VSS. POC reviewed with pt and spouse.  No skin break down noted. No c/o pain today. No falls noted this shift. Bed in low position. Call light in reach. Will continue to monitor.

## 2018-07-02 NOTE — SUBJECTIVE & OBJECTIVE
Interval History: No complaints. Na improving. Diuresing well. Hematuria unchanged. Eval'ed by [facility] tomorrow. Had an episode of gluc 300's, self-resolved.     Review of Systems   Constitutional: Positive for activity change. Negative for chills, diaphoresis, fatigue, fever and unexpected weight change.   Respiratory: Positive for cough and shortness of breath. Negative for wheezing and stridor.    Cardiovascular: Negative for chest pain, palpitations and leg swelling.   Genitourinary: Positive for hematuria. Negative for flank pain, frequency and urgency.   All other systems reviewed and are negative.    Objective:     Vital Signs (Most Recent):  Temp: 97.4 °F (36.3 °C) (07/02/18 1129)  Pulse: 84 (07/02/18 1519)  Resp: 16 (07/02/18 1129)  BP: 112/68 (07/02/18 1129)  SpO2: (!) 92 % (07/02/18 1129) Vital Signs (24h Range):  Temp:  [97.1 °F (36.2 °C)-98.6 °F (37 °C)] 97.4 °F (36.3 °C)  Pulse:  [84-95] 84  Resp:  [16] 16  SpO2:  [92 %-98 %] 92 %  BP: (102-118)/(52-68) 112/68     Weight: 71.3 kg (157 lb 3 oz)  Body mass index is 22.55 kg/m².    Intake/Output Summary (Last 24 hours) at 07/02/18 1522  Last data filed at 07/02/18 0500   Gross per 24 hour   Intake             1725 ml   Output             1700 ml   Net               25 ml      Physical Exam   Constitutional: He is oriented to person, place, and time. He appears well-nourished. No distress.   HENT:   Head: Normocephalic and atraumatic.   Nose: Nose normal.   Mouth/Throat: Oropharynx is clear and moist. No oropharyngeal exudate.   Eyes: Conjunctivae and EOM are normal. Pupils are equal, round, and reactive to light. Right eye exhibits no discharge. Left eye exhibits no discharge. No scleral icterus.   Neck: Neck supple. No JVD present. No tracheal deviation present. No thyromegaly present.   Cardiovascular: Normal rate, regular rhythm, normal heart sounds and intact distal pulses.  Exam reveals no friction rub.    No murmur heard.  Pulmonary/Chest: Effort  normal. No stridor. No respiratory distress. He has no wheezes. He has rales. He exhibits no tenderness.   Abdominal: Soft. Bowel sounds are normal. He exhibits no distension and no mass. There is no tenderness. There is no rebound and no guarding.   +PEG   Musculoskeletal: Normal range of motion. He exhibits no edema, tenderness or deformity.   Lymphadenopathy:     He has no cervical adenopathy.   Neurological: He is alert and oriented to person, place, and time. No cranial nerve deficit. He exhibits normal muscle tone. Coordination normal.   Skin: Skin is warm and dry. No rash noted. He is not diaphoretic. No erythema. No pallor.   Psychiatric: He has a normal mood and affect. His behavior is normal. Judgment and thought content normal.   Vitals reviewed.      Significant Labs:   A1C:     Recent Labs  Lab 01/27/18 2003 02/22/18  0615 06/29/18  1943   HGBA1C 5.4 5.4 5.0     All pertinent labs within the past 24 hours have been reviewed.    Significant Imaging: I have reviewed and interpreted all pertinent imaging results/findings within the past 24 hours.

## 2018-07-02 NOTE — PLAN OF CARE
Problem: SLP Goal  Goal: SLP Goal  Speech Language Pathology Goals  Goals expected to be met by 7/9/18:    1.  Pt will participate in MBSS to determine the need for additional goals and poc.      Outcome: Ongoing (interventions implemented as appropriate)  Clinical Swallow Evaluation initiated.  Pt well known to SLP services from previous admission.  REC:  Pt remain npo w/ primary nutrition/hydration via PEG tube w/ pleasure feeding of puree and honey thick liquids.  Pt would benefit from repeat MBSS for further evaluation of swallow fx.  Will review recs w/ team.  Cont ST per POC.    Leyda Chopra CCC-SLP  7/2/2018

## 2018-07-02 NOTE — CONSULTS
PharmD Consult received for:  1.) Renally adjust all medications:  · Scr = 2.0, CrCl = 36 ml/min  · All medications renally-adjusted, but will monitor closely as ampicillin may require adjustment depending on fluctuation of creatinine  3.) Warfarin dosing (home regimen: 2.5 mg PO M/Thurs/Sat, 5 mg all other days):   · Indication per CC notes: MV replacement/atrial flutter  · Goal INR per CC: 1.5-2.0  Lab Results   Component Value Date    INR 2.3 (H) 07/02/2018    INR 3.1 (H) 07/01/2018    INR 3.6 (H) 06/30/2018   · Given drop in INR from 3.1 to 2.3 in 24 hours, consider resuming warfarin at 2.5 mg daily, if bleeding is ruled out and no other contraindications present to resume.  · INR daily  · Will follow      Thank you for the consult,    Asiya Avelar, PharmD  [id number]      **Note: Consults are reviewed Monday-Friday 7:30-4pm. The above recommendations are only suggested. The recommendations should be considered in conjunction with all patient factors.**

## 2018-07-02 NOTE — PROGRESS NOTES
[facility] Hospital Medicine  Progress Note    Patient Name: Rodolfo Messina  MRN: [id number]  Patient Class: IP- Inpatient   Admission Date: 6/29/2018  Length of Stay: 2 days  Attending Physician: Ramonita Rae MD  Primary Care Provider: Deric Claudio MD    [facility] Medicine Team: [facility] HOSP MED G Ramonita Rae MD    Subjective:     Principal Problem:Symptomatic anemia    HPI:  No notes on file    Hospital Course:  No notes on file    Interval History: No complaints. Na improving. Diuresing well. Hematuria unchanged. Eval'ed by ST MBSS tomorrow. Had an episode of gluc 300's, self-resolved.     Review of Systems   Constitutional: Positive for activity change. Negative for chills, diaphoresis, fatigue, fever and unexpected weight change.   Respiratory: Positive for cough and shortness of breath. Negative for wheezing and stridor.    Cardiovascular: Negative for chest pain, palpitations and leg swelling.   Genitourinary: Positive for hematuria. Negative for flank pain, frequency and urgency.   All other systems reviewed and are negative.    Objective:     Vital Signs (Most Recent):  Temp: 97.4 °F (36.3 °C) (07/02/18 1129)  Pulse: 84 (07/02/18 1519)  Resp: 16 (07/02/18 1129)  BP: 112/68 (07/02/18 1129)  SpO2: (!) 92 % (07/02/18 1129) Vital Signs (24h Range):  Temp:  [97.1 °F (36.2 °C)-98.6 °F (37 °C)] 97.4 °F (36.3 °C)  Pulse:  [84-95] 84  Resp:  [16] 16  SpO2:  [92 %-98 %] 92 %  BP: (102-118)/(52-68) 112/68     Weight: 71.3 kg (157 lb 3 oz)  Body mass index is 22.55 kg/m².    Intake/Output Summary (Last 24 hours) at 07/02/18 1522  Last data filed at 07/02/18 0500   Gross per 24 hour   Intake             1725 ml   Output             1700 ml   Net               25 ml      Physical Exam   Constitutional: He is oriented to person, place, and time. He appears well-nourished. No distress.   HENT:   Head: Normocephalic and atraumatic.   Nose: Nose normal.   Mouth/Throat: Oropharynx is clear and moist.  No oropharyngeal exudate.   Eyes: Conjunctivae and EOM are normal. Pupils are equal, round, and reactive to light. Right eye exhibits no discharge. Left eye exhibits no discharge. No scleral icterus.   Neck: Neck supple. No JVD present. No tracheal deviation present. No thyromegaly present.   Cardiovascular: Normal rate, regular rhythm, normal heart sounds and intact distal pulses.  Exam reveals no friction rub.    No murmur heard.  Pulmonary/Chest: Effort normal. No stridor. No respiratory distress. He has no wheezes. He has rales. He exhibits no tenderness.   Abdominal: Soft. Bowel sounds are normal. He exhibits no distension and no mass. There is no tenderness. There is no rebound and no guarding.   +PEG   Musculoskeletal: Normal range of motion. He exhibits no edema, tenderness or deformity.   Lymphadenopathy:     He has no cervical adenopathy.   Neurological: He is alert and oriented to person, place, and time. No cranial nerve deficit. He exhibits normal muscle tone. Coordination normal.   Skin: Skin is warm and dry. No rash noted. He is not diaphoretic. No erythema. No pallor.   Psychiatric: He has a normal mood and affect. His behavior is normal. Judgment and thought content normal.   Vitals reviewed.      Significant Labs:   A1C:     Recent Labs  Lab 01/27/18  2003 02/22/18  0615 06/29/18  1943   HGBA1C 5.4 5.4 5.0     All pertinent labs within the past 24 hours have been reviewed.    Significant Imaging: I have reviewed and interpreted all pertinent imaging results/findings within the past 24 hours.    Assessment/Plan:      * Symptomatic anemia    Previous studies c/w AOCD  Hb 6.6 on presentation, s/p 2 units, H/H stable  Likely 2/2 hematuria, which is stable  Monitor H/H          Hypernatremia    2/2 free water deficit  Only receiving 300 cc q8 free water flushes via PEG at home  Increased to 600 cc q8  Monitor Na q12 - caution with overcorrection. Do not correct by more than 12 in a day  Slowly  improving. Received D5 gtt. Monitor Na's off gtt.           Acute on chronic systolic heart failure    Received IV diuresis. Transition back to PO Lasix.   Fluid restrict, low Na, strict I&Os, daily weights          Hematuria    Urology following  Outpt cystoscopy 7/10  Stable  Monitor H/H          Supratherapeutic INR    S/p MVR on coumadin   INR > 4 on presentation  Per previous notes, goal INR 1.5-2 due to being prone to bleeding  Hold Coumadin, will eventually plan to resume at 2.5 mg daily  Daily INR, 2.3 today          Dysphagia    NPO. Resume PEG feeds.   ST recommends MBSS          Subacute bacterial endocarditis    No acute issues  Cont home ampicillin, ceftriaxone end date 7/13            VTE Risk Mitigation     None              Ramonita Rae MD  Department of Hospital Medicine   [facility]

## 2018-07-03 LAB
ALBUMIN SERPL BCP-MCNC: 2.2 G/DL
ALP SERPL-CCNC: 89 U/L
ALT SERPL W/O P-5'-P-CCNC: 28 U/L
ANION GAP SERPL CALC-SCNC: 8 MMOL/L
AST SERPL-CCNC: 16 U/L
BASOPHILS # BLD AUTO: 0.02 K/UL
BASOPHILS NFR BLD: 0.3 %
BILIRUB SERPL-MCNC: 0.5 MG/DL
BUN SERPL-MCNC: 49 MG/DL
CALCIUM SERPL-MCNC: 8.4 MG/DL
CHLORIDE SERPL-SCNC: 116 MMOL/L
CO2 SERPL-SCNC: 26 MMOL/L
CREAT SERPL-MCNC: 1.9 MG/DL
DIFFERENTIAL METHOD: ABNORMAL
EOSINOPHIL # BLD AUTO: 0.4 K/UL
EOSINOPHIL NFR BLD: 7.5 %
ERYTHROCYTE [DISTWIDTH] IN BLOOD BY AUTOMATED COUNT: 15.9 %
EST. GFR  (AFRICAN AMERICAN): 41 ML/MIN/1.73 M^2
EST. GFR  (NON AFRICAN AMERICAN): 35.4 ML/MIN/1.73 M^2
GLUCOSE SERPL-MCNC: 119 MG/DL
HCT VFR BLD AUTO: 27.3 %
HGB BLD-MCNC: 8.6 G/DL
IMM GRANULOCYTES # BLD AUTO: 0.02 K/UL
IMM GRANULOCYTES NFR BLD AUTO: 0.3 %
INR PPP: 1.7
LYMPHOCYTES # BLD AUTO: 0.9 K/UL
LYMPHOCYTES NFR BLD: 15.2 %
MCH RBC QN AUTO: 30.4 PG
MCHC RBC AUTO-ENTMCNC: 31.5 G/DL
MCV RBC AUTO: 97 FL
MONOCYTES # BLD AUTO: 0.5 K/UL
MONOCYTES NFR BLD: 8.3 %
NEUTROPHILS # BLD AUTO: 4 K/UL
NEUTROPHILS NFR BLD: 68.4 %
NRBC BLD-RTO: 0 /100 WBC
PLATELET # BLD AUTO: 177 K/UL
PMV BLD AUTO: 12.2 FL
POCT GLUCOSE: 104 MG/DL (ref 70–110)
POCT GLUCOSE: 113 MG/DL (ref 70–110)
POCT GLUCOSE: 122 MG/DL (ref 70–110)
POTASSIUM SERPL-SCNC: 4.2 MMOL/L
PROT SERPL-MCNC: 5.4 G/DL
PROTHROMBIN TIME: 16.7 SEC
RBC # BLD AUTO: 2.83 M/UL
SODIUM SERPL-SCNC: 150 MMOL/L
SODIUM SERPL-SCNC: 150 MMOL/L
WBC # BLD AUTO: 5.87 K/UL

## 2018-07-03 PROCEDURE — 99233 SBSQ HOSP IP/OBS HIGH 50: CPT | Mod: ,,, | Performed by: HOSPITALIST

## 2018-07-03 PROCEDURE — 11000001 HC ACUTE MED/SURG PRIVATE ROOM

## 2018-07-03 PROCEDURE — 92611 MOTION FLUOROSCOPY/SWALLOW: CPT

## 2018-07-03 PROCEDURE — 80053 COMPREHEN METABOLIC PANEL: CPT

## 2018-07-03 PROCEDURE — 25000003 PHARM REV CODE 250: Performed by: INTERNAL MEDICINE

## 2018-07-03 PROCEDURE — 85610 PROTHROMBIN TIME: CPT

## 2018-07-03 PROCEDURE — 25000003 PHARM REV CODE 250: Performed by: HOSPITALIST

## 2018-07-03 PROCEDURE — 85025 COMPLETE CBC W/AUTO DIFF WBC: CPT

## 2018-07-03 PROCEDURE — G8996 SWALLOW CURRENT STATUS: HCPCS | Mod: CM

## 2018-07-03 PROCEDURE — 63600175 PHARM REV CODE 636 W HCPCS: Performed by: INTERNAL MEDICINE

## 2018-07-03 PROCEDURE — G8997 SWALLOW GOAL STATUS: HCPCS | Mod: CL

## 2018-07-03 PROCEDURE — 25000003 PHARM REV CODE 250: Performed by: NURSE PRACTITIONER

## 2018-07-03 RX ORDER — DEXTROSE MONOHYDRATE 50 MG/ML
INJECTION, SOLUTION INTRAVENOUS CONTINUOUS
Status: DISCONTINUED | OUTPATIENT
Start: 2018-07-03 | End: 2018-07-04

## 2018-07-03 RX ORDER — WARFARIN 2.5 MG/1
2.5 TABLET ORAL DAILY
Status: DISCONTINUED | OUTPATIENT
Start: 2018-07-03 | End: 2018-07-05

## 2018-07-03 RX ADMIN — MIRTAZAPINE 30 MG: 15 TABLET, FILM COATED ORAL at 08:07

## 2018-07-03 RX ADMIN — WARFARIN SODIUM 2.5 MG: 2.5 TABLET ORAL at 05:07

## 2018-07-03 RX ADMIN — CEFTRIAXONE SODIUM 2 G: 2 INJECTION, POWDER, FOR SOLUTION INTRAMUSCULAR; INTRAVENOUS at 08:07

## 2018-07-03 RX ADMIN — AMPICILLIN 2 G: 2 INJECTION, POWDER, FOR SOLUTION INTRAVENOUS at 12:07

## 2018-07-03 RX ADMIN — METOPROLOL SUCCINATE 12.5 MG: 25 TABLET, EXTENDED RELEASE ORAL at 10:07

## 2018-07-03 RX ADMIN — CEFTRIAXONE SODIUM 2 G: 2 INJECTION, POWDER, FOR SOLUTION INTRAMUSCULAR; INTRAVENOUS at 10:07

## 2018-07-03 RX ADMIN — AMPICILLIN 2 G: 2 INJECTION, POWDER, FOR SOLUTION INTRAVENOUS at 05:07

## 2018-07-03 RX ADMIN — ATORVASTATIN CALCIUM 40 MG: 20 TABLET, FILM COATED ORAL at 10:07

## 2018-07-03 RX ADMIN — AMLODIPINE BESYLATE 10 MG: 10 TABLET ORAL at 10:07

## 2018-07-03 RX ADMIN — AMIODARONE HYDROCHLORIDE 200 MG: 200 TABLET ORAL at 10:07

## 2018-07-03 RX ADMIN — DEXTROSE: 5 SOLUTION INTRAVENOUS at 12:07

## 2018-07-03 RX ADMIN — PANTOPRAZOLE SODIUM 40 MG: 40 GRANULE, DELAYED RELEASE ORAL at 10:07

## 2018-07-03 RX ADMIN — POTASSIUM CHLORIDE 20 MEQ: 20 SOLUTION ORAL at 08:07

## 2018-07-03 RX ADMIN — GUAIFENESIN 200 MG: 200 SOLUTION ORAL at 08:07

## 2018-07-03 RX ADMIN — POTASSIUM CHLORIDE 20 MEQ: 20 SOLUTION ORAL at 10:07

## 2018-07-03 RX ADMIN — FUROSEMIDE 40 MG: 40 TABLET ORAL at 10:07

## 2018-07-03 RX ADMIN — TAMSULOSIN HYDROCHLORIDE 0.4 MG: 0.4 CAPSULE ORAL at 10:07

## 2018-07-03 RX ADMIN — CETIRIZINE HYDROCHLORIDE 5 MG: 5 TABLET, FILM COATED ORAL at 10:07

## 2018-07-03 NOTE — ASSESSMENT & PLAN NOTE
Received IV diuresis. Transitioned back to PO Lasix.   Fluid restrict, low Na, strict I&Os, daily weights

## 2018-07-03 NOTE — ASSESSMENT & PLAN NOTE
Per Urology:  - Suspect hematuria is due to minor trauma with lackey removal in setting of supratherapeutic INR and expect that it will continue to improve with correction  - Patient has normal renal US from 2/2018 and is scheduled for cystoscopy on 7/10 which will rule out bladder tumor as source, can discuss with him at that time the risk/benefit of undergo retrograde pyelogram to evaluate the upper tracts  - Do not suspect hematuria as source of patient's persistent anemia as urine this morning appeared more concentrated than willa hematuria   - Currently emptying his bladder therefore no need for lackey catheter placement at this time  - Continue cardura 4 mg and follow up for cystoscopy    Stable.  Monitor H/H.

## 2018-07-03 NOTE — PROGRESS NOTES
[facility] Medicine  Progress Note    Patient Name: Rodolfo Messina  MRN: [id number]  Patient Class: IP- Inpatient   Admission Date: 6/29/2018  Length of Stay: 3 days  Attending Physician: Ramonita Rae MD  Primary Care Provider: Deric Claudio MD    [facility] Medicine Team: [facility] Ramonita Rae MD    Subjective:     Principal Problem:Symptomatic anemia    HPI:  No notes on file    Hospital Course:  No notes on file    Interval History: MBSS showed signs of aspiration today. Patient is sad about the results. Otherwise no complaints. Waiting for improvement in sodium.     Review of Systems   Constitutional: Positive for activity change. Negative for chills, diaphoresis, fatigue, fever and unexpected weight change.   Respiratory: Positive for cough and shortness of breath. Negative for wheezing and stridor.    Cardiovascular: Negative for chest pain, palpitations and leg swelling.   Genitourinary: Positive for hematuria. Negative for flank pain, frequency and urgency.   All other systems reviewed and are negative.    Objective:     Vital Signs (Most Recent):  Temp: 98 °F (36.7 °C) (07/03/18 1211)  Pulse: 89 (07/03/18 1211)  Resp: 18 (07/03/18 1211)  BP: (!) 160/63 (07/03/18 1211)  SpO2: (!) 94 % (07/03/18 1211) Vital Signs (24h Range):  Temp:  [97.7 °F (36.5 °C)-98.6 °F (37 °C)] 98 °F (36.7 °C)  Pulse:  [84-96] 89  Resp:  [16-18] 18  SpO2:  [94 %-99 %] 94 %  BP: (104-160)/(59-63) 160/63     Weight: 71.3 kg (157 lb 3 oz)  Body mass index is 22.55 kg/m².    Intake/Output Summary (Last 24 hours) at 07/03/18 1501  Last data filed at 07/03/18 1400   Gross per 24 hour   Intake             1350 ml   Output             1950 ml   Net             -600 ml      Physical Exam   Constitutional: He is oriented to person, place, and time. He appears well-nourished. No distress.   HENT:   Head: Normocephalic and atraumatic.   Nose: Nose normal.   Mouth/Throat: Oropharynx is clear and moist. No  oropharyngeal exudate.   Eyes: Conjunctivae and EOM are normal. Pupils are equal, round, and reactive to light. Right eye exhibits no discharge. Left eye exhibits no discharge. No scleral icterus.   Neck: Neck supple. No JVD present. No tracheal deviation present. No thyromegaly present.   Cardiovascular: Normal rate, regular rhythm, normal heart sounds and intact distal pulses.  Exam reveals no friction rub.    No murmur heard.  Pulmonary/Chest: Effort normal. No stridor. No respiratory distress. He has no wheezes. He has no rales. He exhibits no tenderness.   Abdominal: Soft. Bowel sounds are normal. He exhibits no distension and no mass. There is no tenderness. There is no rebound and no guarding.   +PEG   Musculoskeletal: Normal range of motion. He exhibits no edema, tenderness or deformity.   Lymphadenopathy:     He has no cervical adenopathy.   Neurological: He is alert and oriented to person, place, and time. No cranial nerve deficit. He exhibits normal muscle tone. Coordination normal.   Skin: Skin is warm and dry. No rash noted. He is not diaphoretic. No erythema. No pallor.   Psychiatric: He has a normal mood and affect. His behavior is normal. Judgment and thought content normal.   Vitals reviewed.      Significant Labs:   A1C:     Recent Labs  Lab 01/27/18  2003 02/22/18  0615 06/29/18  1943   HGBA1C 5.4 5.4 5.0     All pertinent labs within the past 24 hours have been reviewed.    Significant Imaging: I have reviewed and interpreted all pertinent imaging results/findings within the past 24 hours.    Assessment/Plan:      * Symptomatic anemia    Previous studies c/w AOCD.  Hb 6.6 on presentation, s/p 2 units.  Likely 2/2 hematuria, which is persistent but stable.  Monitor daily H/H.  Was taking Coumadin and  at home, both were held on admission.  H/H now stable, resume Coumadin today.          Hypernatremia    2/2 free water deficit  Only receiving 300 cc q8 free water flushes via PEG at home.  Increased to 600 cc q8.  Monitor Na q12 - caution with overcorrection. Do not correct by more than 12 in a day  Slowly improving on D5 gtt and increase in free water flushes.  Increase free water flushes to 500cc q4 today.  Nutrition re-consulted re: hypernatremia, tube feeds, and free water flushes.         Acute on chronic systolic heart failure    Received IV diuresis. Transitioned back to PO Lasix.   Fluid restrict, low Na, strict I&Os, daily weights          Hematuria    Per Urology:  - Suspect hematuria is due to minor trauma with lackey removal in setting of supratherapeutic INR and expect that it will continue to improve with correction  - Patient has normal renal US from 2/2018 and is scheduled for cystoscopy on 7/10 which will rule out bladder tumor as source, can discuss with him at that time the risk/benefit of undergo retrograde pyelogram to evaluate the upper tracts  - Do not suspect hematuria as source of patient's persistent anemia as urine this morning appeared more concentrated than willa hematuria   - Currently emptying his bladder therefore no need for lackey catheter placement at this time  - Continue cardura 4 mg and follow up for cystoscopy    Stable.  Monitor H/H.          Supratherapeutic INR    S/p MVR on coumadin   INR > 4 on presentation  Per previous notes, goal INR 1.5-2 due to being prone to bleeding  Coumadin held on admission  Daily INR  INR 1.7 today. H/H has been stable, will resume coumadin today at 2.5 mg daily            Dysphagia    NPO. Resume PEG feeds.   ST recommends MBSS          S/P MVR (mitral valve replacement)    Failed MBSS. Continue NPO with tube feeds/PEG.  Cont  ST on discharge.           S/P CABG x 1    Home  mg daily being held.  Consider decreasing to 81 mg daily on discharge due to hematuria.           Subacute bacterial endocarditis    No acute issues  Cont home ampicillin, ceftriaxone end date 7/13            VTE Risk Mitigation         Ordered      warfarin (COUMADIN) tablet 2.5 mg  Daily      07/03/18 1216              Ramonita Rae MD  Department of [facility] Medicine   [facility]

## 2018-07-03 NOTE — PROCEDURES
Modified Barium Swallow    Patient Name:  Rodolfo Messina   508/508 A    MRN:  [id number]      Recommendations:     Recommendations:                General Recommendations:  Dysphagia therapy  Diet recommendations:  NPO, Pleasure Feeding, NPO   For pleasure, pt appears safe for pureed solids via 1/2 tsp x3-5 MAX, 3-5x/ day with consistent implementation of all the following aspiration precautions  Aspiration Precautions: Strict aspiration precautions   · Fully awake and alert for PO intake  · Fully upright position for PO intake  · Small bites: 1/2 tsp size bolus MAX  · Slow rate of eating/ drinking  · 1 bite  @ a time  · Refrain from talking prior to swallow completion   · Remain upright for 20-30 min post PO intake   · Discontinue PO upon: coughing, throat clearing, choking, wet vocal quality, wet breath sounds, watery eyes, reddened facial features  General Precautions: Standard, aspiration, NPO, fall    Referral     Reason for Referral  Patient was referred for a Modified Barium Swallow Study to assess the efficiency of his/her swallow function, rule out aspiration and make recommendations regarding safe dietary consistencies, effective compensatory strategies, and safe eating environment.     Diagnosis: Symptomatic anemia     History:     Past Medical History:   Diagnosis Date    ANNA (acute kidney injury) 2/22/2018    Anemia     Anxiety     Arthritis     BPH (benign prostatic hyperplasia)     CHF (congestive heart failure) 5/23/2018    Coarse tremors     Coronary artery disease of native artery of native heart with stable angina pectoris 5/21/2018    Diverticulosis     Encounter for blood transfusion     Endocarditis     Hypertension     Urinary retention        Objective:     Current Respiratory Status: 07/03/18    Alert: yes    Cooperative: yes    Follows Directions: yes    Visualization  · Patient was seen in the lateral view    Oral Peripheral Examination  · Oral Musculature: WFL  · Dentition:  "present and adequate  · Secretion Management: adequate  · Mucosal Quality: adequate  · Mandibular Strength and Mobility: WFL  · Oral Labial Strength and Mobility: WFL  · Lingual Strength and Mobility: WFL  · Buccal Strength and Mobility: WFL  · Volitional Cough: adequate  · Volitional Swallow: timely  · Voice Prior to PO Intake: clear    Consistencies Assessed  · Thin liquid- cup sip x1  · Nectar thickened liquid- tsp x2, small cup sip x3, average size cup sip (per pt, his typical size cup sip) x1  · Honey thickened liquid- cup sip x2  · Pureed apple sauce- tsp x1  · Regular consistency solid saltine cracker- <1/4 of cracker bite x1    Oral Preparation/Oral Phase  · Across consistencies, adequate bolus acceptance without anterior loss   · Timely initiation of bolus manipulation   · Adequate bolus control and formation across liquid viscosities  · Slightly prolonged mastication of solid  · Dec'd bolus control and formation with pureed and solid characterized by premature spillage to the oropharynx  · Dec'd BOT retraction with mod-sev BOT residue  across consistencies    Pharyngeal Phase   · Timely swallow initiation across liquid viscosities  · Pooling to the level of the vallecula and delayed swallow initiation with pureed and solid   · Dec'd hyolaryngeal excursion and elevation and epiglottic inversion   · Delayed return of epiglottis to neutral position   · Thin liquid- Deep penetration during the swallow.   · Nectar thickened liquid- Aspiration of average size bolus (per pt, his typical size cup sip) with sensory response characterized by coughing elicited. Inconsistent penetration ranging from flash to moderately deep with tsp and small cup sip boluses.   · Honey thickened liquid- Deep penetration during the swallow.   · Pureed apple sauce- Moderately deep penetration during the swallow.   · Regular consistency solid- Unable to be observed as pt reported severe pain 2/2 "bedsore" warranting termination of study " in order for pt to reposition himself. However overt clinical signs of aspiration unappreciated with bite x1.   · Across consistencies, pt with inconsistent spontaneous double swallow per bolus warranting verbal prompting for cued double swallow per bolus to resolve mod-sev BOT residue and to reduce mild-mod vallecular and pyriform residue to trace   · Across consistencies, pt with inconsistent sensory response upon penetration  · Across consistencies, cued throat clear/ cough inconsistently beneficial to eject penetrated material out of the airway concerning for eventual aspiration across consistencies     Cervical Esophageal Phase  · Retrograde movement unappreciated     Assessment:     Impressions  · Pt with mod-sev oropharyngeal phase dysphagia characterized by aspiration of nectar thickened liquid and intermittent penetration across consistencies which is inconsistently resolved with cued coughing/ throat clearing concerning for eventual aspiration.     Prognosis: Good with good participation in ongoing dysphagia therapy and good adherence to independent completion of dysphagia exercises.     Plan  SLP to continue to follow pt to provide ongoing education and dysphagia therapy.     Education  Education provided to pt re: results of MBSS and ongoing SLP POC.     Goals:    SLP Goals        Problem: SLP Goal    Goal Priority Disciplines Outcome   SLP Goal     SLP Ongoing (interventions implemented as appropriate)   Description:  Speech Language Pathology Goals  Goals expected to be met by 7/10/18:    1.  For therapeutic purposes only, pt will tolerate cup sips nectar thickened liquid with independent completion of double swallow per bolus with no overt clinical signs of aspiration.   2. Pt will ind'ly complete dysphagia exercises x10 each with good ability to strengthen the swallowing musculature.                          Plan:   · Patient to be seen:  Therapy Frequency: 3 x/week   · Plan of Care expires:   07/31/18  · Plan of Care reviewed with:  patient        Discharge recommendations:  home health speech therapy     Time Tracking:   SLP Treatment Date:   07/03/18  Speech Start Time:  0918  Speech Stop Time:  0942     Speech Total Time (min):  24 min    RIVIN Flores, CCC-SLP  [phone]  7/3/2018

## 2018-07-03 NOTE — CONSULTS
PharmD Consult received for:  1.) Renally adjust all medications:  · Scr = 1.9, CrCl = 37 ml/min  · All medications renally-adjusted, but will monitor closely as ampicillin may require adjustment depending on fluctuation of creatinine  3.) Warfarin dosing (home regimen: 2.5 mg PO M/Thurs/Sat, 5 mg all other days):   · Indication per CC notes: MV replacement/atrial flutter  · Goal INR per CC: 1.5-2.0  Lab Results   Component Value Date    INR 1.7 (H) 07/03/2018    INR 2.3 (H) 07/02/2018    INR 3.1 (H) 07/01/2018     · Resume warfarin at 2.5 mg daily, if bleeding is ruled out and no other contraindications present to resume.  · INR daily  · Will follow      Thank you for the consult,    Asiya Avelar, PharmD  [id number]      **Note: Consults are reviewed Monday-Friday 7:30-4pm. The above recommendations are only suggested. The recommendations should be considered in conjunction with all patient factors.**

## 2018-07-03 NOTE — PLAN OF CARE
Problem: SLP Goal  Goal: SLP Goal  Speech Language Pathology Goals  Goals expected to be met by 7/10/18:    1.  For therapeutic purposes only, pt will tolerate cup sips nectar thickened liquid with independent completion of double swallow per bolus with no overt clinical signs of aspiration.   2. Pt will ind'ly complete dysphagia exercises x10 each with good ability to strengthen the swallowing musculature.        Outcome: Ongoing (interventions implemented as appropriate)  MBSS complete. Recommend remain NPO with ongoing PEG tube for all nutrition, hydration, medication. If PO meds are necessary, recommend crush in pureed.     IRVIN Flores, CCC-SLP  [phone]  7/3/2018

## 2018-07-03 NOTE — PLAN OF CARE
07/03/18 1350   Discharge Reassessment   Assessment Type Discharge Planning Reassessment   Do you have any problems affording any of your prescribed medications? No   Discharge Plan A Home Health   Discharge Plan B Skilled Nursing Facility   Can the patient answer the patient profile reliably? Yes, cognitively intact   How does the patient rate their overall health at the present time? Good   Describe the patient's ability to walk at the present time. Minor restrictions or changes   How often would a person be available to care for the patient? Whenever needed   Number of comorbid conditions (as recorded on the chart) Three   During the past month, has the patient often been bothered by feeling down, depressed or hopeless? No   During the past month, has the patient often been bothered by little interest or pleasure in doing things? No

## 2018-07-03 NOTE — PLAN OF CARE
Problem: Patient Care Overview  Goal: Plan of Care Review  Outcome: Ongoing (interventions implemented as appropriate)  Patient resting in bed comfortably. IV intact and infusing free of infection and irration,  Cont feedings going, fall precautions maintained no falls noted. Call light in reach bed locked and in lowest position. Non skid socks on while out of bed. Patient instructed to call for assistance. Skin integrity maintained as patient is independent with frequent positioning,no C/o pain, No other complaints or concerns. Will continue to monitor and follow careplan of care.

## 2018-07-03 NOTE — SUBJECTIVE & OBJECTIVE
Interval History: MBSS showed signs of aspiration today. Patient is sad about the results. Otherwise no complaints. Waiting for improvement in sodium.     Review of Systems   Constitutional: Positive for activity change. Negative for chills, diaphoresis, fatigue, fever and unexpected weight change.   Respiratory: Positive for cough and shortness of breath. Negative for wheezing and stridor.    Cardiovascular: Negative for chest pain, palpitations and leg swelling.   Genitourinary: Positive for hematuria. Negative for flank pain, frequency and urgency.   All other systems reviewed and are negative.    Objective:     Vital Signs (Most Recent):  Temp: 98 °F (36.7 °C) (07/03/18 1211)  Pulse: 89 (07/03/18 1211)  Resp: 18 (07/03/18 1211)  BP: (!) 160/63 (07/03/18 1211)  SpO2: (!) 94 % (07/03/18 1211) Vital Signs (24h Range):  Temp:  [97.7 °F (36.5 °C)-98.6 °F (37 °C)] 98 °F (36.7 °C)  Pulse:  [84-96] 89  Resp:  [16-18] 18  SpO2:  [94 %-99 %] 94 %  BP: (104-160)/(59-63) 160/63     Weight: 71.3 kg (157 lb 3 oz)  Body mass index is 22.55 kg/m².    Intake/Output Summary (Last 24 hours) at 07/03/18 1501  Last data filed at 07/03/18 1400   Gross per 24 hour   Intake             1350 ml   Output             1950 ml   Net             -600 ml      Physical Exam   Constitutional: He is oriented to person, place, and time. He appears well-nourished. No distress.   HENT:   Head: Normocephalic and atraumatic.   Nose: Nose normal.   Mouth/Throat: Oropharynx is clear and moist. No oropharyngeal exudate.   Eyes: Conjunctivae and EOM are normal. Pupils are equal, round, and reactive to light. Right eye exhibits no discharge. Left eye exhibits no discharge. No scleral icterus.   Neck: Neck supple. No JVD present. No tracheal deviation present. No thyromegaly present.   Cardiovascular: Normal rate, regular rhythm, normal heart sounds and intact distal pulses.  Exam reveals no friction rub.    No murmur heard.  Pulmonary/Chest: Effort  normal. No stridor. No respiratory distress. He has no wheezes. He has no rales. He exhibits no tenderness.   Abdominal: Soft. Bowel sounds are normal. He exhibits no distension and no mass. There is no tenderness. There is no rebound and no guarding.   +PEG   Musculoskeletal: Normal range of motion. He exhibits no edema, tenderness or deformity.   Lymphadenopathy:     He has no cervical adenopathy.   Neurological: He is alert and oriented to person, place, and time. No cranial nerve deficit. He exhibits normal muscle tone. Coordination normal.   Skin: Skin is warm and dry. No rash noted. He is not diaphoretic. No erythema. No pallor.   Psychiatric: He has a normal mood and affect. His behavior is normal. Judgment and thought content normal.   Vitals reviewed.      Significant Labs:   A1C:     Recent Labs  Lab 01/27/18 2003 02/22/18  0615 06/29/18  1943   HGBA1C 5.4 5.4 5.0     All pertinent labs within the past 24 hours have been reviewed.    Significant Imaging: I have reviewed and interpreted all pertinent imaging results/findings within the past 24 hours.

## 2018-07-03 NOTE — ASSESSMENT & PLAN NOTE
2/2 free water deficit  Only receiving 300 cc q8 free water flushes via PEG at home. Increased to 600 cc q8.  Monitor Na q12 - caution with overcorrection. Do not correct by more than 12 in a day  Slowly improving on D5 gtt and increase in free water flushes.  Increase free water flushes to 500cc q4 today.  Nutrition re-consulted re: hypernatremia, tube feeds, and free water flushes.

## 2018-07-03 NOTE — ASSESSMENT & PLAN NOTE
S/p MVR on coumadin   INR > 4 on presentation  Per previous notes, goal INR 1.5-2 due to being prone to bleeding  Coumadin held on admission  Daily INR  INR 1.7 today. H/H has been stable, will resume coumadin today at 2.5 mg daily

## 2018-07-03 NOTE — ASSESSMENT & PLAN NOTE
Previous studies c/w AOCD.  Hb 6.6 on presentation, s/p 2 units.  Likely 2/2 hematuria, which is persistent but stable.  Monitor daily H/H.  H/H now stable, resume Coumadin today.

## 2018-07-03 NOTE — ASSESSMENT & PLAN NOTE
Previous studies c/w AOCD.  Hb 6.6 on presentation, s/p 2 units.  Likely 2/2 hematuria but now stable  H&H stable   Resume home dose  given recent CABG  On lower dose warfarin of 2.5 with goal of 1.5-2 given bleeding issues; INR today 1.4  Continue to monitor

## 2018-07-04 PROBLEM — R79.1 SUPRATHERAPEUTIC INR: Status: RESOLVED | Noted: 2018-06-30 | Resolved: 2018-07-04

## 2018-07-04 PROBLEM — I50.23 ACUTE ON CHRONIC SYSTOLIC HEART FAILURE: Status: RESOLVED | Noted: 2018-07-01 | Resolved: 2018-07-04

## 2018-07-04 LAB
ALBUMIN SERPL BCP-MCNC: 2.2 G/DL
ALP SERPL-CCNC: 83 U/L
ALT SERPL W/O P-5'-P-CCNC: 23 U/L
ANION GAP SERPL CALC-SCNC: 7 MMOL/L
AST SERPL-CCNC: 16 U/L
BASOPHILS # BLD AUTO: 0.02 K/UL
BASOPHILS NFR BLD: 0.3 %
BILIRUB SERPL-MCNC: 0.5 MG/DL
BUN SERPL-MCNC: 40 MG/DL
CALCIUM SERPL-MCNC: 7.5 MG/DL
CHLORIDE SERPL-SCNC: 114 MMOL/L
CO2 SERPL-SCNC: 24 MMOL/L
CREAT SERPL-MCNC: 1.6 MG/DL
DIFFERENTIAL METHOD: ABNORMAL
EOSINOPHIL # BLD AUTO: 0.5 K/UL
EOSINOPHIL NFR BLD: 7.7 %
ERYTHROCYTE [DISTWIDTH] IN BLOOD BY AUTOMATED COUNT: 15.9 %
EST. GFR  (AFRICAN AMERICAN): 50.4 ML/MIN/1.73 M^2
EST. GFR  (NON AFRICAN AMERICAN): 43.6 ML/MIN/1.73 M^2
GLUCOSE SERPL-MCNC: 110 MG/DL
HCT VFR BLD AUTO: 26.3 %
HGB BLD-MCNC: 8.3 G/DL
IMM GRANULOCYTES # BLD AUTO: 0.02 K/UL
IMM GRANULOCYTES NFR BLD AUTO: 0.3 %
INR PPP: 1.4
LYMPHOCYTES # BLD AUTO: 1.1 K/UL
LYMPHOCYTES NFR BLD: 18 %
MCH RBC QN AUTO: 30.3 PG
MCHC RBC AUTO-ENTMCNC: 31.6 G/DL
MCV RBC AUTO: 96 FL
MONOCYTES # BLD AUTO: 0.5 K/UL
MONOCYTES NFR BLD: 8.1 %
NEUTROPHILS # BLD AUTO: 3.8 K/UL
NEUTROPHILS NFR BLD: 65.6 %
NRBC BLD-RTO: 0 /100 WBC
PLATELET # BLD AUTO: 174 K/UL
PMV BLD AUTO: 12.8 FL
POCT GLUCOSE: 106 MG/DL (ref 70–110)
POCT GLUCOSE: 110 MG/DL (ref 70–110)
POCT GLUCOSE: 140 MG/DL (ref 70–110)
POCT GLUCOSE: 98 MG/DL (ref 70–110)
POTASSIUM SERPL-SCNC: 4.3 MMOL/L
PROT SERPL-MCNC: 5.3 G/DL
PROTHROMBIN TIME: 13.7 SEC
RBC # BLD AUTO: 2.74 M/UL
SODIUM SERPL-SCNC: 141 MMOL/L
SODIUM SERPL-SCNC: 141 MMOL/L
SODIUM SERPL-SCNC: 145 MMOL/L
SODIUM SERPL-SCNC: 145 MMOL/L
WBC # BLD AUTO: 5.83 K/UL

## 2018-07-04 PROCEDURE — 25000003 PHARM REV CODE 250: Performed by: HOSPITALIST

## 2018-07-04 PROCEDURE — 85025 COMPLETE CBC W/AUTO DIFF WBC: CPT

## 2018-07-04 PROCEDURE — 80053 COMPREHEN METABOLIC PANEL: CPT

## 2018-07-04 PROCEDURE — 25000003 PHARM REV CODE 250: Performed by: NURSE PRACTITIONER

## 2018-07-04 PROCEDURE — 11000001 HC ACUTE MED/SURG PRIVATE ROOM

## 2018-07-04 PROCEDURE — 63600175 PHARM REV CODE 636 W HCPCS: Performed by: INTERNAL MEDICINE

## 2018-07-04 PROCEDURE — 25000003 PHARM REV CODE 250: Performed by: INTERNAL MEDICINE

## 2018-07-04 PROCEDURE — 85610 PROTHROMBIN TIME: CPT

## 2018-07-04 PROCEDURE — 84295 ASSAY OF SERUM SODIUM: CPT

## 2018-07-04 PROCEDURE — 99232 SBSQ HOSP IP/OBS MODERATE 35: CPT | Mod: ,,, | Performed by: HOSPITALIST

## 2018-07-04 RX ORDER — ASPIRIN 325 MG
325 TABLET ORAL DAILY
Status: DISCONTINUED | OUTPATIENT
Start: 2018-07-04 | End: 2018-07-05

## 2018-07-04 RX ORDER — ASPIRIN 325 MG
325 TABLET ORAL DAILY
Status: DISCONTINUED | OUTPATIENT
Start: 2018-07-04 | End: 2018-07-04

## 2018-07-04 RX ADMIN — MIRTAZAPINE 30 MG: 15 TABLET, FILM COATED ORAL at 09:07

## 2018-07-04 RX ADMIN — CETIRIZINE HYDROCHLORIDE 5 MG: 5 TABLET, FILM COATED ORAL at 09:07

## 2018-07-04 RX ADMIN — AMPICILLIN 2 G: 2 INJECTION, POWDER, FOR SOLUTION INTRAVENOUS at 12:07

## 2018-07-04 RX ADMIN — PANTOPRAZOLE SODIUM 40 MG: 40 GRANULE, DELAYED RELEASE ORAL at 09:07

## 2018-07-04 RX ADMIN — ATORVASTATIN CALCIUM 40 MG: 20 TABLET, FILM COATED ORAL at 09:07

## 2018-07-04 RX ADMIN — AMPICILLIN 2 G: 2 INJECTION, POWDER, FOR SOLUTION INTRAVENOUS at 06:07

## 2018-07-04 RX ADMIN — TAMSULOSIN HYDROCHLORIDE 0.4 MG: 0.4 CAPSULE ORAL at 09:07

## 2018-07-04 RX ADMIN — FUROSEMIDE 40 MG: 40 TABLET ORAL at 09:07

## 2018-07-04 RX ADMIN — AMLODIPINE BESYLATE 10 MG: 10 TABLET ORAL at 09:07

## 2018-07-04 RX ADMIN — METOPROLOL SUCCINATE 12.5 MG: 25 TABLET, EXTENDED RELEASE ORAL at 09:07

## 2018-07-04 RX ADMIN — POTASSIUM CHLORIDE 20 MEQ: 20 SOLUTION ORAL at 09:07

## 2018-07-04 RX ADMIN — WARFARIN SODIUM 2.5 MG: 2.5 TABLET ORAL at 04:07

## 2018-07-04 RX ADMIN — AMPICILLIN 2 G: 2 INJECTION, POWDER, FOR SOLUTION INTRAVENOUS at 11:07

## 2018-07-04 RX ADMIN — GUAIFENESIN 200 MG: 200 SOLUTION ORAL at 09:07

## 2018-07-04 RX ADMIN — ASPIRIN 325 MG ORAL TABLET 325 MG: 325 PILL ORAL at 04:07

## 2018-07-04 RX ADMIN — AMIODARONE HYDROCHLORIDE 200 MG: 200 TABLET ORAL at 09:07

## 2018-07-04 NOTE — ASSESSMENT & PLAN NOTE
2/2 free water deficit  DC D5 infusion and monitor with free water flushes; frequency increased  Appreciate nutrition recommendation   Continue to monitor

## 2018-07-04 NOTE — SUBJECTIVE & OBJECTIVE
Interval History: wife reports some itching and cough with antibiotics but these symptoms have been present since at home; no other issues     Review of Systems   Constitutional: Negative for chills, fatigue and fever.   Respiratory: Positive for cough. Negative for shortness of breath.    Cardiovascular: Negative for chest pain and palpitations.   Gastrointestinal: Negative for abdominal pain.   Genitourinary:        Hematuria resolved; able to urinate      Objective:     Vital Signs (Most Recent):  Temp: 99.1 °F (37.3 °C) (07/04/18 0800)  Pulse: 85 (07/04/18 1047)  Resp: 17 (07/04/18 0800)  BP: 125/61 (07/04/18 0800)  SpO2: (!) 93 % (07/04/18 0800) Vital Signs (24h Range):  Temp:  [98.2 °F (36.8 °C)-99.1 °F (37.3 °C)] 99.1 °F (37.3 °C)  Pulse:  [85-93] 85  Resp:  [17-18] 17  SpO2:  [91 %-95 %] 93 %  BP: (108-125)/(61-99) 125/61     Weight: 71.3 kg (157 lb 3 oz)  Body mass index is 22.55 kg/m².    Intake/Output Summary (Last 24 hours) at 07/04/18 1615  Last data filed at 07/04/18 1500   Gross per 24 hour   Intake             2200 ml   Output             1500 ml   Net              700 ml      Physical Exam   Constitutional: He is oriented to person, place, and time. He appears well-developed and well-nourished. No distress.   Cardiovascular: Normal rate and regular rhythm.    Pulmonary/Chest: Effort normal and breath sounds normal. No respiratory distress. He has no rales.   Abdominal: Soft. Bowel sounds are normal.   Genitourinary:   Genitourinary Comments: Noted yellow urine; no hematuria    Neurological: He is alert and oriented to person, place, and time.   Vitals reviewed.      Significant Labs: All pertinent labs within the past 24 hours have been reviewed.    Significant Imaging: I have reviewed all pertinent imaging results/findings within the past 24 hours.

## 2018-07-04 NOTE — PLAN OF CARE
Problem: Patient Care Overview  Goal: Plan of Care Review  Outcome: Ongoing (interventions implemented as appropriate)  Patient resting in bed comfortably. IV intact and infusing free of infection and irration,fall precautions maintained no falls noted. Call light in reach bed locked and in lowest position. Non skid socks on while out of bed. Patient instructed to call for assistance. Skin integrity maintained as patient is independent with frequent positioning, no C/o pain, PEG tube in place with cont feeding, No other complaints or concerns. . Will continue to monitor and follow careplan of care.

## 2018-07-04 NOTE — CONSULTS
Consult received pt with persistent hypernatremia with tube feeds.    Following pt - next follow-up: 7/6/18.     Recommend increasing free water flush frequency.

## 2018-07-04 NOTE — PROGRESS NOTES
[facility] Hospital Medicine  Progress Note    Patient Name: Rodolfo Messina  MRN: [id number]  Patient Class: IP- Inpatient   Admission Date: 6/29/2018  Length of Stay: 4 days  Attending Physician: Deejay Osman MD  Primary Care Provider: Deric Claudio MD    [facility] Medicine Team: [facility] Deejay Osman MD    Subjective:     Principal Problem:Symptomatic anemia    HPI:  No notes on file    Hospital Course:  No notes on file    Interval History: wife reports some itching and cough with antibiotics but these symptoms have been present since at home; no other issues     Review of Systems   Constitutional: Negative for chills, fatigue and fever.   Respiratory: Positive for cough. Negative for shortness of breath.    Cardiovascular: Negative for chest pain and palpitations.   Gastrointestinal: Negative for abdominal pain.   Genitourinary:        Hematuria resolved; able to urinate      Objective:     Vital Signs (Most Recent):  Temp: 99.1 °F (37.3 °C) (07/04/18 0800)  Pulse: 85 (07/04/18 1047)  Resp: 17 (07/04/18 0800)  BP: 125/61 (07/04/18 0800)  SpO2: (!) 93 % (07/04/18 0800) Vital Signs (24h Range):  Temp:  [98.2 °F (36.8 °C)-99.1 °F (37.3 °C)] 99.1 °F (37.3 °C)  Pulse:  [85-93] 85  Resp:  [17-18] 17  SpO2:  [91 %-95 %] 93 %  BP: (108-125)/(61-99) 125/61     Weight: 71.3 kg (157 lb 3 oz)  Body mass index is 22.55 kg/m².    Intake/Output Summary (Last 24 hours) at 07/04/18 1615  Last data filed at 07/04/18 1500   Gross per 24 hour   Intake             2200 ml   Output             1500 ml   Net              700 ml      Physical Exam   Constitutional: He is oriented to person, place, and time. He appears well-developed and well-nourished. No distress.   Cardiovascular: Normal rate and regular rhythm.    Pulmonary/Chest: Effort normal and breath sounds normal. No respiratory distress. He has no rales.   Abdominal: Soft. Bowel sounds are normal.   Genitourinary:   Genitourinary  Comments: Noted yellow urine; no hematuria    Neurological: He is alert and oriented to person, place, and time.   Vitals reviewed.      Significant Labs: All pertinent labs within the past 24 hours have been reviewed.    Significant Imaging: I have reviewed all pertinent imaging results/findings within the past 24 hours.    Assessment/Plan:      * Symptomatic anemia    Previous studies c/w AOCD.  Hb 6.6 on presentation, s/p 2 units.  Likely 2/2 hematuria but now stable  H&H stable   Resume home dose  given recent CABG  On lower dose warfarin of 2.5 with goal of 1.5-2 given bleeding issues; INR today 1.4  Continue to monitor               Hematuria    Per Urology:  - Suspect hematuria is due to minor trauma with lackey removal in setting of supratherapeutic INR and expect that it will continue to improve with correction  - Patient has normal renal US from 2/2018 and is scheduled for cystoscopy on 7/10 which will rule out bladder tumor as source, can discuss with him at that time the risk/benefit of undergo retrograde pyelogram to evaluate the upper tracts  - Do not suspect hematuria as source of patient's persistent anemia as urine this morning appeared more concentrated than willa hematuria   - Currently emptying his bladder therefore no need for lackey catheter placement at this time  - Continue cardura 4 mg and follow up for cystoscopy    Stable.  Monitor H/H.          Hypernatremia    2/2 free water deficit  DC D5 infusion and monitor with free water flushes; frequency increased  Appreciate nutrition recommendation   Continue to monitor         Dysphagia    NPO. Resume PEG feeds.             S/P MVR (mitral valve replacement)    Monitoring INRs with resumption of warfarin           S/P CABG x 1    Resumed home ASA at 325 mg and monitor   Continue medical therapy           Chronic systolic congestive heart failure    - no acute issues  - continue medical management           Subacute bacterial endocarditis     - remains afebrile and no leukocytosis  - patient reports cough and itching in the back from antibiotics  - ID consulted and recommended discontinuation of abx for now             VTE Risk Mitigation         Ordered     warfarin (COUMADIN) tablet 2.5 mg  Daily      07/03/18 1216              Deejay Osman MD  Department of Hospital Medicine   [facility]

## 2018-07-04 NOTE — ASSESSMENT & PLAN NOTE
- remains afebrile and no leukocytosis  - patient reports cough and itching in the back from antibiotics  - ID consulted and recommended discontinuation of abx for now

## 2018-07-05 ENCOUNTER — TELEPHONE (OUTPATIENT)
Dept: ADMINISTRATIVE | Facility: CLINIC | Age: 68
End: 2018-07-05

## 2018-07-05 PROBLEM — R05.9 COUGH: Status: ACTIVE | Noted: 2018-02-22

## 2018-07-05 PROBLEM — R21 RASH OF BACK: Status: ACTIVE | Noted: 2018-07-05

## 2018-07-05 LAB
ALBUMIN SERPL BCP-MCNC: 2.3 G/DL
ALP SERPL-CCNC: 92 U/L
ALT SERPL W/O P-5'-P-CCNC: 22 U/L
ANION GAP SERPL CALC-SCNC: 6 MMOL/L
AST SERPL-CCNC: 19 U/L
BASOPHILS # BLD AUTO: 0.03 K/UL
BASOPHILS NFR BLD: 0.5 %
BILIRUB SERPL-MCNC: 0.6 MG/DL
BUN SERPL-MCNC: 37 MG/DL
CALCIUM SERPL-MCNC: 8.5 MG/DL
CHLORIDE SERPL-SCNC: 112 MMOL/L
CO2 SERPL-SCNC: 23 MMOL/L
CREAT SERPL-MCNC: 1.5 MG/DL
DIFFERENTIAL METHOD: ABNORMAL
EOSINOPHIL # BLD AUTO: 0.4 K/UL
EOSINOPHIL NFR BLD: 7.8 %
ERYTHROCYTE [DISTWIDTH] IN BLOOD BY AUTOMATED COUNT: 15.8 %
EST. GFR  (AFRICAN AMERICAN): 54.5 ML/MIN/1.73 M^2
EST. GFR  (NON AFRICAN AMERICAN): 47.2 ML/MIN/1.73 M^2
GLUCOSE SERPL-MCNC: 104 MG/DL
HCT VFR BLD AUTO: 27 %
HGB BLD-MCNC: 8.4 G/DL
IMM GRANULOCYTES # BLD AUTO: 0.04 K/UL
IMM GRANULOCYTES NFR BLD AUTO: 0.7 %
INR PPP: 1.2
LYMPHOCYTES # BLD AUTO: 0.8 K/UL
LYMPHOCYTES NFR BLD: 15.3 %
MCH RBC QN AUTO: 30.2 PG
MCHC RBC AUTO-ENTMCNC: 31.1 G/DL
MCV RBC AUTO: 97 FL
MONOCYTES # BLD AUTO: 0.4 K/UL
MONOCYTES NFR BLD: 7.3 %
NEUTROPHILS # BLD AUTO: 3.8 K/UL
NEUTROPHILS NFR BLD: 68.4 %
NRBC BLD-RTO: 0 /100 WBC
PLATELET # BLD AUTO: 164 K/UL
PMV BLD AUTO: 13.6 FL
POCT GLUCOSE: 106 MG/DL (ref 70–110)
POCT GLUCOSE: 94 MG/DL (ref 70–110)
POTASSIUM SERPL-SCNC: 4.6 MMOL/L
PROT SERPL-MCNC: 5.4 G/DL
PROTHROMBIN TIME: 12.2 SEC
RBC # BLD AUTO: 2.78 M/UL
SODIUM SERPL-SCNC: 141 MMOL/L
WBC # BLD AUTO: 5.5 K/UL

## 2018-07-05 PROCEDURE — 80053 COMPREHEN METABOLIC PANEL: CPT

## 2018-07-05 PROCEDURE — 99223 1ST HOSP IP/OBS HIGH 75: CPT | Mod: GC,,, | Performed by: INTERNAL MEDICINE

## 2018-07-05 PROCEDURE — 97535 SELF CARE MNGMENT TRAINING: CPT

## 2018-07-05 PROCEDURE — 25000003 PHARM REV CODE 250: Performed by: NURSE PRACTITIONER

## 2018-07-05 PROCEDURE — 63600175 PHARM REV CODE 636 W HCPCS: Performed by: HOSPITALIST

## 2018-07-05 PROCEDURE — 92526 ORAL FUNCTION THERAPY: CPT

## 2018-07-05 PROCEDURE — 25000003 PHARM REV CODE 250: Performed by: HOSPITALIST

## 2018-07-05 PROCEDURE — 25000003 PHARM REV CODE 250: Performed by: INTERNAL MEDICINE

## 2018-07-05 PROCEDURE — 99232 SBSQ HOSP IP/OBS MODERATE 35: CPT | Mod: ,,, | Performed by: HOSPITALIST

## 2018-07-05 PROCEDURE — 85025 COMPLETE CBC W/AUTO DIFF WBC: CPT

## 2018-07-05 PROCEDURE — 85610 PROTHROMBIN TIME: CPT

## 2018-07-05 PROCEDURE — 11000001 HC ACUTE MED/SURG PRIVATE ROOM

## 2018-07-05 RX ORDER — NAPROXEN SODIUM 220 MG/1
81 TABLET, FILM COATED ORAL DAILY
Status: DISCONTINUED | OUTPATIENT
Start: 2018-07-06 | End: 2018-07-09

## 2018-07-05 RX ORDER — WARFARIN 1 MG/1
3 TABLET ORAL DAILY
Status: DISCONTINUED | OUTPATIENT
Start: 2018-07-05 | End: 2018-07-07

## 2018-07-05 RX ADMIN — ATORVASTATIN CALCIUM 40 MG: 20 TABLET, FILM COATED ORAL at 08:07

## 2018-07-05 RX ADMIN — CEFTRIAXONE SODIUM 2 G: 2 INJECTION, POWDER, FOR SOLUTION INTRAMUSCULAR; INTRAVENOUS at 09:07

## 2018-07-05 RX ADMIN — POTASSIUM CHLORIDE 20 MEQ: 20 SOLUTION ORAL at 09:07

## 2018-07-05 RX ADMIN — ASPIRIN 325 MG ORAL TABLET 325 MG: 325 PILL ORAL at 08:07

## 2018-07-05 RX ADMIN — AMLODIPINE BESYLATE 10 MG: 10 TABLET ORAL at 08:07

## 2018-07-05 RX ADMIN — METOPROLOL SUCCINATE 12.5 MG: 25 TABLET, EXTENDED RELEASE ORAL at 12:07

## 2018-07-05 RX ADMIN — PANTOPRAZOLE SODIUM 40 MG: 40 GRANULE, DELAYED RELEASE ORAL at 08:07

## 2018-07-05 RX ADMIN — AMPICILLIN 2 G: 2 INJECTION, POWDER, FOR SOLUTION INTRAVENOUS at 08:07

## 2018-07-05 RX ADMIN — GUAIFENESIN 200 MG: 200 SOLUTION ORAL at 09:07

## 2018-07-05 RX ADMIN — WARFARIN SODIUM 3 MG: 1 TABLET ORAL at 03:07

## 2018-07-05 RX ADMIN — AMIODARONE HYDROCHLORIDE 200 MG: 200 TABLET ORAL at 08:07

## 2018-07-05 RX ADMIN — POTASSIUM CHLORIDE 20 MEQ: 20 SOLUTION ORAL at 08:07

## 2018-07-05 RX ADMIN — CETIRIZINE HYDROCHLORIDE 5 MG: 5 TABLET, FILM COATED ORAL at 08:07

## 2018-07-05 RX ADMIN — TAMSULOSIN HYDROCHLORIDE 0.4 MG: 0.4 CAPSULE ORAL at 08:07

## 2018-07-05 RX ADMIN — FUROSEMIDE 40 MG: 40 TABLET ORAL at 08:07

## 2018-07-05 RX ADMIN — MIRTAZAPINE 30 MG: 15 TABLET, FILM COATED ORAL at 09:07

## 2018-07-05 NOTE — CONSULTS
Consult received. Full note to follow.    Please call for questions.    Thanks,  Jim Ledesma MD  Infectious Disease Fellow, PGY-5  Pager: [phone] or ext: [phone]  [facility]

## 2018-07-05 NOTE — ASSESSMENT & PLAN NOTE
Patient's cough has been present since at least December 2017, which is prior to initiation of antibiotics, and has persisted both on and off antibiotics. Severity of cough has waxed and waned throughout this time period. Patient states that overall, he feels it has improved after his surgery. Cough is occasionally associated with producing white, foamy sputum. Chest imaging has shown chronic pulmonary edema likely related to his valvular insufficiency, as well as CT showing multiple pulmonary nodules. Patient has been evaluated by Pulmonology clinic previously for this complaint. Of note, patient has seasonal allergies and endorses symptoms of postnasal drip currently. Currently on cetirizine inpatient; however, states he takes Claritin at home because Zyrtec does not improve his symptoms typically. Previously used nasal corticosteroids but discontinued on the advice of CT surgery following episode of epistaxis with NG placement. Denies GERD, and states Pulmonology clinic recommended course of PPI to evaluate for improvement in cough, which patient discontinued after 2 weeks due to lack of improvement. He was unaware that PPI can take 4-6 weeks until clinical improvement.    Recommendations:  - This cough is chronic, predating his antibiotics and has been evaluated by Pulmonology in the past  - Likely multifactorial in nature (chronic pulmonary edema and postnasal drip) and is therefore not a contraindication to antibiotic therapy  - Recommend resuming ampicillin and ceftriaxone to complete 6 week course

## 2018-07-05 NOTE — ASSESSMENT & PLAN NOTE
Cough and rash unlikely to be related to antibiotic regimen. Recommend resuming course of ampicillin and ceftriaxone and completing 6 week course.

## 2018-07-05 NOTE — ASSESSMENT & PLAN NOTE
Previous studies c/w AOCD.  Hb 6.6 on presentation, s/p 2 units.  Likely 2/2 hematuria but now stable  H&H stable   Resume home dose ASA but can start at 81 mg; notified by Dr. Go's nurse in CT surgery   INR lower today; increase coumadin dose to day to 3  Continue to monitor

## 2018-07-05 NOTE — NURSING
Plan of care reviewed and updated. Pt AA+O. Pt's pain is managed with the medication ordered at this time. Pt's VS are as charted.  No falls this shift. Pt is oriented to room and call system. Will continue to montior.

## 2018-07-05 NOTE — PROGRESS NOTES
"[facility]  Infectious Disease  Progress Note    Patient Name: Rodolfo Messina  MRN: [id number]  Admission Date: 6/29/2018  Length of Stay: 5 days  Attending Physician: Deejay Osman MD  Primary Care Provider: Deric Claudio MD    Isolation Status: No active isolations  Assessment/Plan:      Chronic cough    Patient's cough has been present since at least December 2017, which is prior to initiation of antibiotics, and has persisted both on and off antibiotics. Severity of cough has waxed and waned throughout this time period. Patient states that overall, he feels it has improved after his surgery. Cough is occasionally associated with producing white, foamy sputum. Chest imaging has shown chronic pulmonary edema likely related to his valvular insufficiency, as well as CT showing multiple pulmonary nodules. Patient has been evaluated by Pulmonology clinic previously for this complaint. Of note, patient has seasonal allergies and endorses symptoms of postnasal drip currently. Currently on cetirizine inpatient; however, states he takes Claritin at home because Zyrtec does not improve his symptoms typically. Previously used nasal corticosteroids but discontinued on the advice of CT surgery following episode of epistaxis with NG placement. Denies GERD, and states Pulmonology clinic recommended course of PPI to evaluate for improvement in cough, which patient discontinued after 2 weeks due to lack of improvement. He was unaware that PPI can take 4-6 weeks until clinical improvement.    Recommendations:  - This cough is chronic, predating his antibiotics and has been evaluated by Pulmonology in the past  - Likely multifactorial in nature (chronic pulmonary edema and postnasal drip) and is therefore not a contraindication to antibiotic therapy  - Recommend resuming ampicillin and ceftriaxone to complete 6 week course        Rash of back    Patient described his rash as "rough skin" and denies any " itching. On exam, rash is non-specific and does not appear urticarial in nature. Low suspicion for allergy. Recommend Dermatology clinic follow if the rash is bothersome to the patient.        Subacute bacterial endocarditis    Cough and rash unlikely to be related to antibiotic regimen. Recommend resuming course of ampicillin and ceftriaxone and completing 6 week course.            Thank you for your consult. Discussed with Dr. Ashraf. I will sign off. Please contact us if you have any additional questions.    Cavlin Antoine MD  Infectious Disease  [facility]    Subjective:     Principal Problem:Symptomatic anemia    HPI: 68M h/o HFrEF, MR s/p MVR (5/2018), CAD s/p CABG (5/2018), dysphagia s/p PEG, hypernatremia, anemia, SBE on rocephin / amp, presenting with hematuria after lackey was removed.     Patient with complicated recent medical hx, with prolonged 28d hospitalization 5/2018 after CABG / MVR with course complicated by SBE - started on rocephin / amp end date 7/13, dysphagia - s/p PEG, and urinary retention with lackey placement (recently removed 6/26 by urology). Pt was recently admitted to [facility] 6/23 for anemia, Hb 6.7 at that time, down from 7.0 6/20 and with dyspnea thought to be related to symptomatic anemia, pt mildly volume overloaded as well, was diuresed and transfused 2u PRBC with discharge Hb of 8.1     Per above, pt saw urology 6/26 for indwelling lackey / urinary retention, underwent voiding trial and patient passed, lackey was removed, to continue flomax and stop doxazosin.      6/28 pt began to note blood in urine, worsening to point appearing to be willa blood, presented to ED 6/29. Pt denies other sources of bleeding (no hematochezia, melena), denies worsening SOB/chest pain/palpitations, denies dysuria or difficulty urinating.     Hemoglobin now stable, and hematuria is resolved. Coumadin restarted.    Infectious Diseases consulted due to concern for allergic  reaction to antibiotics as patient is reporting cough and rash. Mr. Messina is currently on his second 6 week course of ampicillin/ceftriaxone for subacute bacterial endocarditis, end date 7/13.    Past Medical History:   Diagnosis Date    ANNA (acute kidney injury) 2/22/2018    Anemia     Anxiety     Arthritis     BPH (benign prostatic hyperplasia)     CHF (congestive heart failure) 5/23/2018    Coarse tremors     Coronary artery disease of native artery of native heart with stable angina pectoris 5/21/2018    Diverticulosis     Encounter for blood transfusion     Endocarditis     Hypertension     Urinary retention        Past Surgical History:   Procedure Laterality Date    APPENDECTOMY      COLONOSCOPY      COLONOSCOPY N/A 2/1/2018    Procedure: COLONOSCOPY;  Surgeon: Richar Payan MD;  Location: [facility] (4TH FLR);  Service: Endoscopy;  Laterality: N/A;  PM prep    CREATION OF AORTOCORONARY ARTERY BYPASS N/A 5/25/2018    Procedure: AORTOCORONARY BYPASS-CABG;  Surgeon: Marvin Go MD;  Location: [facility] OR [address] [place] [address];  Service: Cardiovascular;  Laterality: N/A;    EYE SURGERY Right     cataract extraction    FINGER SURGERY      FRACTURE SURGERY Right     index finger    MITRAL VALVE REPLACEMENT N/A 5/25/2018    Procedure: Mitral Valve Replacement;  Surgeon: Marvin Go MD;  Location: [facility] OR [address] [place] [address];  Service: Cardiovascular;  Laterality: N/A;    TONSILLECTOMY         Review of patient's allergies indicates:   Allergen Reactions    Shellfish containing products     Augmentin [amoxicillin-pot clavulanate]      Patient felt that it raised BP and requested to have it added as intolerance. Tolerated unasyn and ampicillin.    Ciprofloxacin     Flagyl [metronidazole]     Lisinopril Other (See Comments)     cough    Mysoline [primidone]     Omnicef [cefdinir]        Medications:  Prescriptions Prior to Admission   Medication Sig    albuterol-ipratropium 2.5mg-0.5mg/3mL (DUO-NEB) 0.5 mg-3  mg(2.5 mg base)/3 mL nebulizer solution 3 mLs every 4 (four) hours as needed.     amiodarone (PACERONE) 200 MG Tab 1 tablet (200 mg total) by Per G Tube route once daily.    amLODIPine (NORVASC) 10 MG tablet 1 tablet (10 mg total) by Per G Tube route once daily.    ampicillin sodium (AMPICILLIN 2 G/100 ML NS, READY TO MIX SYSTEM,) Inject 100 mLs (2 g total) into the vein every 6 (six) hours. (Patient taking differently: Inject 8 g into the vein continuous. )    aspirin 81 MG Chew 4 tablets (324 mg total) by Per G Tube route once daily.    atorvastatin (LIPITOR) 40 MG tablet 1 tablet (40 mg total) by Per G Tube route once daily.    benzonatate (TESSALON) 100 MG capsule Take 100 mg by mouth 3 (three) times daily as needed for Cough.    cefTRIAXone 2 g in dextrose 5 % 50 mL (ROCEPHIN) 2 g/50 mL PgBk IVPB Inject 50 mLs (2 g total) into the vein every 12 (twelve) hours.    doxazosin (CARDURA) 4 MG tablet 1 tablet (4 mg total) by Per G Tube route once daily.    furosemide (LASIX) 40 MG tablet Take 1 tablet (40 mg total) by mouth once daily.    hydrALAZINE (APRESOLINE) 25 MG tablet 1 tablet (25 mg total) by Per G Tube route every 8 (eight) hours.    loratadine (CLARITIN) 10 mg tablet Take 10 mg by mouth once daily.    mirtazapine (REMERON) 30 MG tablet Take 1 tablet (30 mg total) by mouth every evening.    pantoprazole (PROTONIX) 40 MG tablet Take 1 tablet (40 mg total) by mouth once daily.    potassium chloride 10% (KAYCIEL) 20 mEq/15 mL oral solution 15 mLs (20 mEq total) by Per G Tube route 2 (two) times daily.    VENTOLIN HFA 90 mcg/actuation inhaler     warfarin (COUMADIN) 5 MG tablet 1 tablet (5 mg total) by Per G Tube route Daily.     Antibiotics     None        Antifungals     None        Antivirals     None           Immunization History   Administered Date(s) Administered    Td - PF (ADULT) 08/24/2016       Family History     Problem Relation (Age of Onset)    Heart disease Mother, Father    No  Known Problems Sister, Brother    Stroke Mother        Social History     Social History    Marital status:      Spouse name: N/A    Number of children: N/A    Years of education: N/A     Occupational History     [employer]     Social History Main Topics    Smoking status: Never Smoker    Smokeless tobacco: Never Used    Alcohol use No      Comment: none recently    Drug use: No    Sexual activity: Not Currently     Partners: Female     Other Topics Concern    None     Social History Narrative    None     Review of Systems   Constitutional: Positive for fatigue.   HENT: Positive for postnasal drip and trouble swallowing.    Respiratory: Positive for cough.    Cardiovascular: Negative for chest pain, palpitations and leg swelling.   Gastrointestinal: Negative for abdominal pain, constipation, diarrhea and nausea.   Genitourinary: Negative for difficulty urinating, dysuria and hematuria.   Musculoskeletal: Negative for back pain and joint swelling.   Skin: Positive for rash and wound.   Neurological: Negative for headaches.     Objective:     Vital Signs (Most Recent):  Temp: 98.1 °F (36.7 °C) (07/05/18 0839)  Pulse: 86 (07/05/18 0839)  Resp: (!) 185 (07/05/18 0839)  BP: 121/64 (07/05/18 0839)  SpO2: 95 % (07/05/18 0839) Vital Signs (24h Range):  Temp:  [97.7 °F (36.5 °C)-98.7 °F (37.1 °C)] 98.1 °F (36.7 °C)  Pulse:  [77-86] 86  Resp:  [] 185  SpO2:  [92 %-95 %] 95 %  BP: (114-126)/(56-68) 121/64     Weight: 71.3 kg (157 lb 3 oz)  Body mass index is 22.55 kg/m².    Estimated Creatinine Clearance: 44.6 mL/min (A) (based on SCr of 1.6 mg/dL (H)).    Physical Exam   Constitutional: He is oriented to person, place, and time. He appears well-developed and well-nourished. No distress.   HENT:   Head: Normocephalic and atraumatic.   Eyes: Conjunctivae are normal. No scleral icterus.   Neck: Normal range of motion. Neck supple.   Cardiovascular: Normal rate, regular rhythm, normal heart sounds and intact  distal pulses.    Pulmonary/Chest: Effort normal. He has rales (mild, bilateral bases).   Abdominal: Soft. He exhibits no distension. There is no tenderness.   Healing incision sites, no erythema or drainage  PEG site c/d/i   Musculoskeletal: Normal range of motion. He exhibits no edema.   Neurological: He is alert and oriented to person, place, and time.   Skin:   Rash over upper back consisting of small papules. Not urticarial in appearance. No erythema or ulcerations.  Skin breakdown on sacrum, no erythema/drainage       Significant Labs: All pertinent labs within the past 24 hours have been reviewed.    Significant Imaging: I have reviewed all pertinent imaging results/findings within the past 24 hours.

## 2018-07-05 NOTE — HPI
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     Patient with complicated recent medical hx, with prolonged 28d hospitalization 5/2018 after CABG / MVR with course complicated by SBE - started on rocephin / amp end date 7/13, dysphagia - s/p PEG, and urinary retention with lackey placement (recently removed 6/26 by urology). Pt was recently admitted to [facility] 6/23 for anemia, Hb 6.7 at that time, down from 7.0 6/20 and with dyspnea thought to be related to symptomatic anemia, pt mildly volume overloaded as well, was diuresed and transfused 2u PRBC with discharge Hb of 8.1     Per above, pt saw urology 6/26 for indwelling lackey / urinary retention, underwent voiding trial and patient passed, lackey was removed, to continue flomax and stop doxazosin.      6/28 pt began to note blood in urine, worsening to point appearing to be willa blood, presented to ED 6/29. Pt denies other sources of bleeding (no hematochezia, melena), denies worsening SOB/chest pain/palpitations, denies dysuria or difficulty urinating.     Hemoglobin now stable, and hematuria is resolved. Coumadin restarted.    Infectious Diseases consulted due to concern for allergic reaction to antibiotics as patient is reporting cough and rash. Mr. Messina is currently on his second 6 week course of ampicillin/ceftriaxone for subacute bacterial endocarditis, end date 7/13.

## 2018-07-05 NOTE — PROGRESS NOTES
[facility] Hospital Medicine  Progress Note    Patient Name: Rodolfo Messina  MRN: [id number]  Patient Class: IP- Inpatient   Admission Date: 6/29/2018  Length of Stay: 5 days  Attending Physician: Deejay Osman MD  Primary Care Provider: Deric Claudio MD    [facility] Medicine Team: [facility]OSP MED G Deejay Osman MD    Subjective:     Principal Problem:Symptomatic anemia    HPI:  No notes on file    Hospital Course:  No notes on file    Interval History: urinating well without hematuria; feels well; no sob     Review of Systems   Constitutional: Negative for chills, fatigue and fever.   Respiratory: Negative for cough and shortness of breath.    Cardiovascular: Negative for chest pain and palpitations.   Gastrointestinal: Negative for abdominal pain.   Genitourinary:        Hematuria resolved; able to urinate      Objective:     Vital Signs (Most Recent):  Temp: 97.8 °F (36.6 °C) (07/05/18 1209)  Pulse: 86 (07/05/18 1209)  Resp: 20 (07/05/18 1209)  BP: (!) 120/58 (07/05/18 1209)  SpO2: 95 % (07/05/18 1209) Vital Signs (24h Range):  Temp:  [97.7 °F (36.5 °C)-98.7 °F (37.1 °C)] 97.8 °F (36.6 °C)  Pulse:  [77-86] 86  Resp:  [] 20  SpO2:  [92 %-95 %] 95 %  BP: (114-126)/(56-68) 120/58     Weight: 71.3 kg (157 lb 3 oz)  Body mass index is 22.55 kg/m².    Intake/Output Summary (Last 24 hours) at 07/05/18 1411  Last data filed at 07/05/18 0800   Gross per 24 hour   Intake             2070 ml   Output              500 ml   Net             1570 ml      Physical Exam   Constitutional: He is oriented to person, place, and time. He appears well-developed and well-nourished. No distress.   Cardiovascular: Normal rate and regular rhythm.    Pulmonary/Chest: Effort normal and breath sounds normal.   Abdominal: Soft. Bowel sounds are normal.   Genitourinary:   Genitourinary Comments: Noted yellow urine; no hematuria    Neurological: He is alert and oriented to person, place, and time.   Skin:    Healing cabg scars    Vitals reviewed.      Significant Labs: All pertinent labs within the past 24 hours have been reviewed.    Significant Imaging: I have reviewed all pertinent imaging results/findings within the past 24 hours.    Assessment/Plan:      * Symptomatic anemia    Previous studies c/w AOCD.  Hb 6.6 on presentation, s/p 2 units.  Likely 2/2 hematuria but now stable  H&H stable   Resume home dose ASA but can start at 81 mg; notified by Dr. Go's nurse in CT surgery   INR lower today; increase coumadin dose to day to 3  Continue to monitor               Hematuria    Per Urology:  - Suspect hematuria is due to minor trauma with lackey removal in setting of supratherapeutic INR and expect that it will continue to improve with correction  - Patient has normal renal US from 2/2018 and is scheduled for cystoscopy on 7/10 which will rule out bladder tumor as source, can discuss with him at that time the risk/benefit of undergo retrograde pyelogram to evaluate the upper tracts  - Do not suspect hematuria as source of patient's persistent anemia as urine this morning appeared more concentrated than willa hematuria   - Currently emptying his bladder therefore no need for lackey catheter placement at this time  - Continue cardura 4 mg and follow up for cystoscopy    Stable.  Monitor H/H.          Hypernatremia    2/2 free water deficit  D5W infusion discontinued  Sodium levels improved; now WNL  Continue with free water flushes at current frequency and dose  Continue to monitor         Dysphagia    NPO. Resume PEG feeds.             S/P MVR (mitral valve replacement)    Monitoring INRs with resumption of warfarin           S/P CABG x 1    Resumed home ASA at 325 mg and monitor   Continue medical therapy           Chronic systolic congestive heart failure    - no acute issues  - continue medical management           Subacute bacterial endocarditis    - remains afebrile and no leukocytosis  - patient reports  cough and itching in the back from antibiotics  - ID consulted and recommended discontinuation of abx for now             VTE Risk Mitigation         Ordered     warfarin (COUMADIN) tablet 3 mg  Daily      07/05/18 1410              Deejay Osman MD  Department of Hospital Medicine   [facility]

## 2018-07-05 NOTE — ASSESSMENT & PLAN NOTE
2/2 free water deficit  D5W infusion discontinued  Sodium levels improved; now WNL  Continue with free water flushes at current frequency and dose  Continue to monitor

## 2018-07-05 NOTE — CONSULTS
PharmD Consult received for:  1.) Renally adjust all medications:  · Scr = 1.9, CrCl = 37 ml/min  · All medications renally-adjusted, but will monitor closely as ampicillin may require adjustment depending on fluctuation of creatinine  3.) Warfarin dosing (home regimen: 2.5 mg PO M/Thurs/Sat, 5 mg all other days):   · Indication per CC notes: MV replacement/atrial flutter  · Goal INR per CC: 1.5-2.0  Lab Results   Component Value Date    INR 1.4 (H) 07/04/2018    INR 1.7 (H) 07/03/2018    INR 2.3 (H) 07/02/2018     · Received 2 doses of 2.5 mg, give 1 more dose of this and then consider increasing to 3 mg tomorrow if INR continues to drop.  · INR daily  · Will follow      Thank you for the consult,    Asiya Avelar, PharmD  [id number]      **Note: Consults are reviewed Monday-Friday 7:30-4pm. The above recommendations are only suggested. The recommendations should be considered in conjunction with all patient factors.**

## 2018-07-05 NOTE — SUBJECTIVE & OBJECTIVE
Past Medical History:   Diagnosis Date    ANNA (acute kidney injury) 2/22/2018    Anemia     Anxiety     Arthritis     BPH (benign prostatic hyperplasia)     CHF (congestive heart failure) 5/23/2018    Coarse tremors     Coronary artery disease of native artery of native heart with stable angina pectoris 5/21/2018    Diverticulosis     Encounter for blood transfusion     Endocarditis     Hypertension     Urinary retention        Past Surgical History:   Procedure Laterality Date    APPENDECTOMY      COLONOSCOPY      COLONOSCOPY N/A 2/1/2018    Procedure: COLONOSCOPY;  Surgeon: Richar Payan MD;  Location: [facility] (4TH FLR);  Service: Endoscopy;  Laterality: N/A;  PM prep    CREATION OF AORTOCORONARY ARTERY BYPASS N/A 5/25/2018    Procedure: AORTOCORONARY BYPASS-CABG;  Surgeon: Marvin Go MD;  Location: [facility] OR 2ND FLR;  Service: Cardiovascular;  Laterality: N/A;    EYE SURGERY Right     cataract extraction    FINGER SURGERY      FRACTURE SURGERY Right     index finger    MITRAL VALVE REPLACEMENT N/A 5/25/2018    Procedure: Mitral Valve Replacement;  Surgeon: Marvin Go MD;  [facility];  Service: Cardiovascular;  Laterality: N/A;    TONSILLECTOMY         Review of patient's allergies indicates:   Allergen Reactions    Shellfish containing products     Augmentin [amoxicillin-pot clavulanate]      Patient felt that it raised BP and requested to have it added as intolerance. Tolerated unasyn and ampicillin.    Ciprofloxacin     Flagyl [metronidazole]     Lisinopril Other (See Comments)     cough    Mysoline [primidone]     Omnicef [cefdinir]        Medications:  Prescriptions Prior to Admission   Medication Sig    albuterol-ipratropium 2.5mg-0.5mg/3mL (DUO-NEB) 0.5 mg-3 mg(2.5 mg base)/3 mL nebulizer solution 3 mLs every 4 (four) hours as needed.     amiodarone (PACERONE) 200 MG Tab 1 tablet (200 mg total) by Per G Tube route once daily.    amLODIPine (NORVASC)  10 MG tablet 1 tablet (10 mg total) by Per G Tube route once daily.    ampicillin sodium (AMPICILLIN 2 G/100 ML NS, READY TO MIX SYSTEM,) Inject 100 mLs (2 g total) into the vein every 6 (six) hours. (Patient taking differently: Inject 8 g into the vein continuous. )    aspirin 81 MG Chew 4 tablets (324 mg total) by Per G Tube route once daily.    atorvastatin (LIPITOR) 40 MG tablet 1 tablet (40 mg total) by Per G Tube route once daily.    benzonatate (TESSALON) 100 MG capsule Take 100 mg by mouth 3 (three) times daily as needed for Cough.    cefTRIAXone 2 g in dextrose 5 % 50 mL (ROCEPHIN) 2 g/50 mL PgBk IVPB Inject 50 mLs (2 g total) into the vein every 12 (twelve) hours.    doxazosin (CARDURA) 4 MG tablet 1 tablet (4 mg total) by Per G Tube route once daily.    furosemide (LASIX) 40 MG tablet Take 1 tablet (40 mg total) by mouth once daily.    hydrALAZINE (APRESOLINE) 25 MG tablet 1 tablet (25 mg total) by Per G Tube route every 8 (eight) hours.    loratadine (CLARITIN) 10 mg tablet Take 10 mg by mouth once daily.    mirtazapine (REMERON) 30 MG tablet Take 1 tablet (30 mg total) by mouth every evening.    pantoprazole (PROTONIX) 40 MG tablet Take 1 tablet (40 mg total) by mouth once daily.    potassium chloride 10% (KAYCIEL) 20 mEq/15 mL oral solution 15 mLs (20 mEq total) by Per G Tube route 2 (two) times daily.    VENTOLIN HFA 90 mcg/actuation inhaler     warfarin (COUMADIN) 5 MG tablet 1 tablet (5 mg total) by Per G Tube route Daily.     Antibiotics     None        Antifungals     None        Antivirals     None           Immunization History   Administered Date(s) Administered    Td - PF (ADULT) 08/24/2016       Family History     Problem Relation (Age of Onset)    Heart disease Mother, Father    No Known Problems Sister, Brother    Stroke Mother        Social History     Social History    Marital status:      Spouse name: N/A    Number of children: N/A    Years of education: N/A      Occupational History     [employer]     Social History Main Topics    Smoking status: Never Smoker    Smokeless tobacco: Never Used    Alcohol use No      Comment: none recently    Drug use: No    Sexual activity: Not Currently     Partners: Female     Other Topics Concern    None     Social History Narrative    None     Review of Systems   Constitutional: Positive for fatigue.   HENT: Positive for postnasal drip and trouble swallowing.    Respiratory: Positive for cough.    Cardiovascular: Negative for chest pain, palpitations and leg swelling.   Gastrointestinal: Negative for abdominal pain, constipation, diarrhea and nausea.   Genitourinary: Negative for difficulty urinating, dysuria and hematuria.   Musculoskeletal: Negative for back pain and joint swelling.   Skin: Positive for rash and wound.   Neurological: Negative for headaches.     Objective:     Vital Signs (Most Recent):  Temp: 98.1 °F (36.7 °C) (07/05/18 0839)  Pulse: 86 (07/05/18 0839)  Resp: (!) 185 (07/05/18 0839)  BP: 121/64 (07/05/18 0839)  SpO2: 95 % (07/05/18 0839) Vital Signs (24h Range):  Temp:  [97.7 °F (36.5 °C)-98.7 °F (37.1 °C)] 98.1 °F (36.7 °C)  Pulse:  [77-86] 86  Resp:  [] 185  SpO2:  [92 %-95 %] 95 %  BP: (114-126)/(56-68) 121/64     Weight: 71.3 kg (157 lb 3 oz)  Body mass index is 22.55 kg/m².    Estimated Creatinine Clearance: 44.6 mL/min (A) (based on SCr of 1.6 mg/dL (H)).    Physical Exam   Constitutional: He is oriented to person, place, and time. He appears well-developed and well-nourished. No distress.   HENT:   Head: Normocephalic and atraumatic.   Eyes: Conjunctivae are normal. No scleral icterus.   Neck: Normal range of motion. Neck supple.   Cardiovascular: Normal rate, regular rhythm, normal heart sounds and intact distal pulses.    Pulmonary/Chest: Effort normal. He has rales (mild, bilateral bases).   Abdominal: Soft. He exhibits no distension. There is no tenderness.   Healing incision sites, no  erythema or drainage  PEG site c/d/i   Musculoskeletal: Normal range of motion. He exhibits no edema.   Neurological: He is alert and oriented to person, place, and time.   Skin:   Rash over upper back consisting of small papules. Not urticarial in appearance. No erythema or ulcerations.  Skin breakdown on sacrum, no erythema/drainage       Significant Labs: All pertinent labs within the past 24 hours have been reviewed.    Significant Imaging: I have reviewed all pertinent imaging results/findings within the past 24 hours.

## 2018-07-05 NOTE — ASSESSMENT & PLAN NOTE
"Patient described his rash as "rough skin" and denies any itching. On exam, rash is non-specific and does not appear urticarial in nature. Low suspicion for allergy. Recommend Dermatology clinic follow if the rash is bothersome to the patient.  "

## 2018-07-05 NOTE — PLAN OF CARE
Problem: SLP Goal  Goal: SLP Goal  Speech Language Pathology Goals  Goals expected to be met by 7/10/18:    1.  For therapeutic purposes only, pt will tolerate cup sips nectar thickened liquid with independent completion of double swallow per bolus with no overt clinical signs of aspiration.   2. Pt will ind'ly complete dysphagia exercises x10 each with good ability to strengthen the swallowing musculature.         Outcome: Ongoing (interventions implemented as appropriate)  Pt participated well w/ tx tasks.  Cont ST per POC.    Leyda Chopra CCC-SLP  7/5/2018

## 2018-07-05 NOTE — PT/OT/SLP PROGRESS
"Speech Language Pathology Treatment    Patient Name:  Rodolfo Messina   MRN:  [id number]  Admitting Diagnosis: Symptomatic anemia    Recommendations:                 General Recommendations:  Dysphagia therapy  Diet recommendations:  NPO, Liquid Diet Level: NPO   Aspiration Precautions: Continue alternate means of nutrition   General Precautions: Standard, aspiration, fall, NPO  Communication strategies:  none    Subjective     "Will I ever be able to eat again?"  Pt asked SLP  Patient goals: to eat and drink     Pain/Comfort:  · Pain Rating 1: 0/10  · Pain Rating Post-Intervention 1: 0/10    Objective:     Has the patient been evaluated by SLP for swallowing?   Yes  Keep patient NPO? Yes   Current Respiratory Status: room air      Pt was awake and alert in NAD.  He completed dysphagia ex;s for tongue base retraction w/ glottal /g, k/ x10 reps, laryngeal elevation w/ falsetto /i/ x10 reps, and effortful swallows x10 reps for pharyngeal strengthening given mod cues w/ fair effort and ability.  Swallow reflex initiation speed remains decreased.  Pt was able to complete Mendhelson maneuver x5 reps given min cues.  He was unable to complete willie maneuver despite max cues.  Education was provided to pt and spouse re: dysphagia HEP and indications/timeline for repeat MBSS.  They indicated good understanding.    Assessment:     Rodolfo Messina is a 68 y.o. male with an SLP diagnosis of Dysphagia.  He presents with severe oropharyngeal dysphagia at this time.    Goals:    SLP Goals        Problem: SLP Goal    Goal Priority Disciplines Outcome   SLP Goal     SLP Ongoing (interventions implemented as appropriate)   Description:  Speech Language Pathology Goals  Goals expected to be met by 7/10/18:    1.  For therapeutic purposes only, pt will tolerate cup sips nectar thickened liquid with independent completion of double swallow per bolus with no overt clinical signs of aspiration.   2. Pt will ind'ly complete dysphagia " exercises x10 each with good ability to strengthen the swallowing musculature.                          Plan:     · Patient to be seen:  3 x/week   · Plan of Care expires:  07/31/18  · Plan of Care reviewed with:  patient, spouse   · SLP Follow-Up:  Yes       Discharge recommendations:  home health speech therapy   Barriers to Discharge:  None    Time Tracking:     SLP Treatment Date:   07/05/18  Speech Start Time:  1139  Speech Stop Time:  1157     Speech Total Time (min):  18 min    Billable Minutes: Treatment Swallowing Dysfunction 9 and Seld Care/Home Management Training 9    Leyda Chopra CCC-SLP  07/05/2018

## 2018-07-05 NOTE — SUBJECTIVE & OBJECTIVE
Interval History: urinating well without hematuria; feels well; no sob     Review of Systems   Constitutional: Negative for chills, fatigue and fever.   Respiratory: Negative for cough and shortness of breath.    Cardiovascular: Negative for chest pain and palpitations.   Gastrointestinal: Negative for abdominal pain.   Genitourinary:        Hematuria resolved; able to urinate      Objective:     Vital Signs (Most Recent):  Temp: 97.8 °F (36.6 °C) (07/05/18 1209)  Pulse: 86 (07/05/18 1209)  Resp: 20 (07/05/18 1209)  BP: (!) 120/58 (07/05/18 1209)  SpO2: 95 % (07/05/18 1209) Vital Signs (24h Range):  Temp:  [97.7 °F (36.5 °C)-98.7 °F (37.1 °C)] 97.8 °F (36.6 °C)  Pulse:  [77-86] 86  Resp:  [] 20  SpO2:  [92 %-95 %] 95 %  BP: (114-126)/(56-68) 120/58     Weight: 71.3 kg (157 lb 3 oz)  Body mass index is 22.55 kg/m².    Intake/Output Summary (Last 24 hours) at 07/05/18 1411  Last data filed at 07/05/18 0800   Gross per 24 hour   Intake             2070 ml   Output              500 ml   Net             1570 ml      Physical Exam   Constitutional: He is oriented to person, place, and time. He appears well-developed and well-nourished. No distress.   Cardiovascular: Normal rate and regular rhythm.    Pulmonary/Chest: Effort normal and breath sounds normal.   Abdominal: Soft. Bowel sounds are normal.   Genitourinary:   Genitourinary Comments: Noted yellow urine; no hematuria    Neurological: He is alert and oriented to person, place, and time.   Skin:   Healing cabg scars    Vitals reviewed.      Significant Labs: All pertinent labs within the past 24 hours have been reviewed.    Significant Imaging: I have reviewed all pertinent imaging results/findings within the past 24 hours.

## 2018-07-06 LAB
ALBUMIN SERPL BCP-MCNC: 2.4 G/DL
ALP SERPL-CCNC: 86 U/L
ALT SERPL W/O P-5'-P-CCNC: 21 U/L
ANION GAP SERPL CALC-SCNC: 7 MMOL/L
AST SERPL-CCNC: 15 U/L
BASOPHILS # BLD AUTO: 0.03 K/UL
BASOPHILS NFR BLD: 0.6 %
BILIRUB SERPL-MCNC: 0.4 MG/DL
BUN SERPL-MCNC: 34 MG/DL
CALCIUM SERPL-MCNC: 8.3 MG/DL
CHLORIDE SERPL-SCNC: 110 MMOL/L
CO2 SERPL-SCNC: 22 MMOL/L
CREAT SERPL-MCNC: 1.6 MG/DL
DIFFERENTIAL METHOD: ABNORMAL
EOSINOPHIL # BLD AUTO: 0.5 K/UL
EOSINOPHIL NFR BLD: 8.7 %
ERYTHROCYTE [DISTWIDTH] IN BLOOD BY AUTOMATED COUNT: 15.8 %
EST. GFR  (AFRICAN AMERICAN): 50.4 ML/MIN/1.73 M^2
EST. GFR  (NON AFRICAN AMERICAN): 43.6 ML/MIN/1.73 M^2
FACT X PPP CHRO-ACNC: 0.58 IU/ML
FACT X PPP CHRO-ACNC: <0.1 IU/ML
GLUCOSE SERPL-MCNC: 97 MG/DL
HCT VFR BLD AUTO: 25.2 %
HGB BLD-MCNC: 7.9 G/DL
IMM GRANULOCYTES # BLD AUTO: 0.01 K/UL
IMM GRANULOCYTES NFR BLD AUTO: 0.2 %
INR PPP: 1.2
LYMPHOCYTES # BLD AUTO: 0.8 K/UL
LYMPHOCYTES NFR BLD: 15.3 %
MCH RBC QN AUTO: 30 PG
MCHC RBC AUTO-ENTMCNC: 31.3 G/DL
MCV RBC AUTO: 96 FL
MONOCYTES # BLD AUTO: 0.6 K/UL
MONOCYTES NFR BLD: 10.2 %
NEUTROPHILS # BLD AUTO: 3.5 K/UL
NEUTROPHILS NFR BLD: 65 %
NRBC BLD-RTO: 0 /100 WBC
PLATELET # BLD AUTO: 147 K/UL
PMV BLD AUTO: 12.6 FL
POCT GLUCOSE: 114 MG/DL (ref 70–110)
POCT GLUCOSE: 129 MG/DL (ref 70–110)
POCT GLUCOSE: 131 MG/DL (ref 70–110)
POTASSIUM SERPL-SCNC: 4.5 MMOL/L
PROT SERPL-MCNC: 5.4 G/DL
PROTHROMBIN TIME: 12.4 SEC
RBC # BLD AUTO: 2.63 M/UL
SODIUM SERPL-SCNC: 139 MMOL/L
WBC # BLD AUTO: 5.41 K/UL

## 2018-07-06 PROCEDURE — 97535 SELF CARE MNGMENT TRAINING: CPT

## 2018-07-06 PROCEDURE — 85520 HEPARIN ASSAY: CPT | Mod: 91

## 2018-07-06 PROCEDURE — 25000003 PHARM REV CODE 250: Performed by: HOSPITALIST

## 2018-07-06 PROCEDURE — 85610 PROTHROMBIN TIME: CPT

## 2018-07-06 PROCEDURE — 80053 COMPREHEN METABOLIC PANEL: CPT

## 2018-07-06 PROCEDURE — 63600175 PHARM REV CODE 636 W HCPCS: Performed by: HOSPITALIST

## 2018-07-06 PROCEDURE — 11000001 HC ACUTE MED/SURG PRIVATE ROOM

## 2018-07-06 PROCEDURE — 25000003 PHARM REV CODE 250: Performed by: INTERNAL MEDICINE

## 2018-07-06 PROCEDURE — 85025 COMPLETE CBC W/AUTO DIFF WBC: CPT

## 2018-07-06 PROCEDURE — 92526 ORAL FUNCTION THERAPY: CPT

## 2018-07-06 PROCEDURE — 99232 SBSQ HOSP IP/OBS MODERATE 35: CPT | Mod: ,,, | Performed by: HOSPITALIST

## 2018-07-06 RX ORDER — HEPARIN SODIUM,PORCINE/D5W 25000/250
17 INTRAVENOUS SOLUTION INTRAVENOUS CONTINUOUS
Status: DISCONTINUED | OUTPATIENT
Start: 2018-07-06 | End: 2018-07-09

## 2018-07-06 RX ADMIN — PANTOPRAZOLE SODIUM 40 MG: 40 GRANULE, DELAYED RELEASE ORAL at 09:07

## 2018-07-06 RX ADMIN — POTASSIUM CHLORIDE 20 MEQ: 20 SOLUTION ORAL at 09:07

## 2018-07-06 RX ADMIN — ATORVASTATIN CALCIUM 40 MG: 20 TABLET, FILM COATED ORAL at 08:07

## 2018-07-06 RX ADMIN — MIRTAZAPINE 30 MG: 15 TABLET, FILM COATED ORAL at 09:07

## 2018-07-06 RX ADMIN — FUROSEMIDE 40 MG: 40 TABLET ORAL at 09:07

## 2018-07-06 RX ADMIN — WARFARIN SODIUM 3 MG: 1 TABLET ORAL at 04:07

## 2018-07-06 RX ADMIN — CEFTRIAXONE SODIUM 2 G: 2 INJECTION, POWDER, FOR SOLUTION INTRAMUSCULAR; INTRAVENOUS at 06:07

## 2018-07-06 RX ADMIN — AMPICILLIN 2 G: 2 INJECTION, POWDER, FOR SOLUTION INTRAVENOUS at 01:07

## 2018-07-06 RX ADMIN — AMPICILLIN 2 G: 2 INJECTION, POWDER, FOR SOLUTION INTRAVENOUS at 06:07

## 2018-07-06 RX ADMIN — AMPICILLIN 2 G: 2 INJECTION, POWDER, FOR SOLUTION INTRAVENOUS at 02:07

## 2018-07-06 RX ADMIN — ASPIRIN 81 MG CHEWABLE TABLET 81 MG: 81 TABLET CHEWABLE at 08:07

## 2018-07-06 RX ADMIN — CETIRIZINE HYDROCHLORIDE 5 MG: 5 TABLET, FILM COATED ORAL at 09:07

## 2018-07-06 RX ADMIN — AMPICILLIN 2 G: 2 INJECTION, POWDER, FOR SOLUTION INTRAVENOUS at 08:07

## 2018-07-06 RX ADMIN — CEFTRIAXONE SODIUM 2 G: 2 INJECTION, POWDER, FOR SOLUTION INTRAMUSCULAR; INTRAVENOUS at 08:07

## 2018-07-06 RX ADMIN — TAMSULOSIN HYDROCHLORIDE 0.4 MG: 0.4 CAPSULE ORAL at 09:07

## 2018-07-06 RX ADMIN — AMIODARONE HYDROCHLORIDE 200 MG: 200 TABLET ORAL at 09:07

## 2018-07-06 RX ADMIN — HEPARIN SODIUM 17 UNITS/KG/HR: 10000 INJECTION, SOLUTION INTRAVENOUS at 10:07

## 2018-07-06 NOTE — NURSING
Pt is laying in bed with wife at bedside. Gave 500ml water bolus with night meds. Emptied 550 ml of urine from urinal. Blood glucose stick was 106.

## 2018-07-06 NOTE — CONSULTS
PharmD Consult received for:  1.) Renally adjust all medications:  · Scr = 1.6, CrCl = 44 ml/min  · All medications renally-adjusted, but will monitor closely as ampicillin may require adjustment depending on fluctuation of creatinine  3.) Warfarin dosing (home regimen: 2.5 mg PO M/Thurs/Sat, 5 mg all other days):   · Indication per CC notes: MV replacement/atrial flutter  · Goal INR per CC: 1.5-2.0  Lab Results   Component Value Date    INR 1.2 07/06/2018    INR 1.2 07/05/2018    INR 1.4 (H) 07/04/2018     · Received 2 doses of 2.5 mg, and 3 mg last night. Continue 3 mg for now and may need to consider bridging patient.  · INR daily  · Will follow      Thank you for the consult,    Asiya Avelar, PharmD  [id number]      **Note: Consults are reviewed Monday-Friday 7:30-4pm. The above recommendations are only suggested. The recommendations should be considered in conjunction with all patient factors.**

## 2018-07-06 NOTE — PLAN OF CARE
Problem: SLP Goal  Goal: SLP Goal  Speech Language Pathology Goals  Goals expected to be met by 7/10/18:    1.  For therapeutic purposes only, pt will tolerate cup sips nectar thickened liquid with independent completion of double swallow per bolus with no overt clinical signs of aspiration.   2. Pt will ind'ly complete dysphagia exercises x10 each with good ability to strengthen the swallowing musculature.         Outcome: Ongoing (interventions implemented as appropriate)  Pt participated well w/ tx tasks.  Cont ST per POC.    Leyda Chopra CCC-SLP  7/6/2018

## 2018-07-06 NOTE — PT/OT/SLP PROGRESS
"Speech Language Pathology Treatment    Patient Name:  Rodolfo Messina   MRN:  [id number]  Admitting Diagnosis: Symptomatic anemia    Recommendations:                 General Recommendations:  Dysphagia therapy  Diet recommendations:  NPO, Liquid Diet Level: NPO   Aspiration Precautions: Continue alternate means of nutrition, Double swallow with each bite/sip, Puree for pleasure, Small bites/sips and Strict aspiration precautions   General Precautions: Standard, aspiration, fall, NPO  Communication strategies:  none    Subjective     "How can you tell if I am coughing because I cough or if its the food?"  Pt stated  Patient goals: to be able to eat and drink     Pain/Comfort:  · Pain Rating 1: 0/10  · Pain Rating Post-Intervention 1: 0/10    Objective:     Has the patient been evaluated by SLP for swallowing?   Yes  Keep patient NPO? Yes   Current Respiratory Status: room air      Pt was awake and alert in NAD.  He was seated upright w/ HOB elevated.  He completed basic dysphagia ex's x10 reps w/ glottal /g, k/, falsetto /i/, and effortful swallows w/ good effort and ability.  He completed willie maneuver x3/10 trials given max cues.  He completed mendelsohn maneuver x3/5 trials given max cues.  He was offered and accepted po trials of pudding x2 1/2 tsp bites using multiple effortful swallows.  He demonstrated immediate coughing and throat clearing following both trials.  Additional trials were deferred d/t increased risk.  Education was provided to pt and spouse re: dysphagia ex HEP and SLP POC.  They indicated good understanding.    Assessment:     Rodolfo Messina is a 68 y.o. male with an SLP diagnosis of Dysphagia.  He presents with severe oropharyngeal dysphagia.    Goals:    SLP Goals        Problem: SLP Goal    Goal Priority Disciplines Outcome   SLP Goal     SLP Ongoing (interventions implemented as appropriate)   Description:  Speech Language Pathology Goals  Goals expected to be met by 7/10/18:    1.  For " therapeutic purposes only, pt will tolerate cup sips nectar thickened liquid with independent completion of double swallow per bolus with no overt clinical signs of aspiration.   2. Pt will ind'ly complete dysphagia exercises x10 each with good ability to strengthen the swallowing musculature.                          Plan:     · Patient to be seen:  3 x/week   · Plan of Care expires:  07/31/18  · Plan of Care reviewed with:  patient, spouse   · SLP Follow-Up:  Yes       Discharge recommendations:  home health speech therapy   Barriers to Discharge:  None    Time Tracking:     SLP Treatment Date:   07/06/18  Speech Start Time:  0940  Speech Stop Time:  1003     Speech Total Time (min):  23 min    Billable Minutes: Treatment Swallowing Dysfunction 13 and Seld Care/Home Management Training 10    Leyda Chopra CCC-SLP  07/06/2018

## 2018-07-06 NOTE — PLAN OF CARE
Problem: Patient Care Overview  Goal: Plan of Care Review    Recommendations    Recommendation/Intervention:     1. Continue current TF regimen of Isosource 1.5 @ 50 mL/hr. Meets 97% EEN, 100% EPN. - Hold for residuals >500.   2. RD to monitor & follow-up.    Goals: Meet % EEN, EPN  Nutrition Goal Status: goal met

## 2018-07-06 NOTE — PROGRESS NOTES
" [facility]  Adult Nutrition  Progress Note    SUMMARY       Recommendations    Recommendation/Intervention:     1. Continue current TF regimen of Isosource 1.5 @ 50 mL/hr. Meets 97% EEN, 100% EPN. - Hold for residuals >500.   2. RD to monitor & follow-up.    Goals: Meet % EEN, EPN  Nutrition Goal Status: goal met  Communication of RD Recs: reviewed with RN    Reason for Assessment    Reason for Assessment: RD follow-up  Diagnosis: other (see comments) (Anemia)  Relevant Medical History: HTN, CHF, PEG placement  Interdisciplinary Rounds: attended  General Information Comments: Pt NPO on TF. Pt reports being on TF since May 2018. Denies N/V. Pt appears with some muscle wasting. Denies wt change and stable wt per chart review. RD feels that pt does not meet malnutrition citeria at this time.   Nutrition Discharge Planning: Tolerance of TF    Nutrition Risk Screen    Nutrition Risk Screen: tube feeding or parenteral nutrition    Nutrition/Diet History    Patient Reported Diet/Restrictions/Preferences: no oral intake, other (see comments) (Isosource 1.5 @ 50 mL/hr x 24 hours/day.)  Do you have any cultural, spiritual, Yazdanism conflicts, given your current situation?: none  Factors Affecting Nutritional Intake: NPO    Anthropometrics    Temp: 97 °F (36.1 °C)  Height Method: Stated  Height: 5' 10" (177.8 cm)  Height (inches): 70 in  Weight Method: Bed Scale  Weight: 71.3 kg (157 lb 3 oz)  Weight (lb): 157.19 lb  Ideal Body Weight (IBW), Male: 166 lb  % Ideal Body Weight, Male (lb): 94.69 lb  BMI (Calculated): 22.6  BMI Grade: 18.5-24.9 - normal  Usual Body Weight (UBW), k kg  % Usual Body Weight: 108.26  % Weight Change From Usual Weight: 8.03 %     Lab/Procedures/Meds    Pertinent Labs Reviewed: reviewed  Pertinent Labs Comments: BUN 34, Cr 1.6, GFR 43.6  Pertinent Medications Reviewed: reviewed  Pertinent Medications Comments: amlodipine, aspirin, statin, lasix, wafarin     Physical " Findings/Assessment    Overall Physical Appearance: underweight, loss of muscle mass  Tubes: gastrostomy tube  Oral/Mouth Cavity: WDL  Skin: intact    Estimated/Assessed Needs    Weight Used For Calorie Calculations: 71.3 kg (157 lb 3 oz)  Energy Calorie Requirements (kcal): 1861 kcal/d  Energy Need Method: [place]-St Jeor (1.25 PAL)  Protein Requirements: 71-86 g/d (1-1.2 g/kg)  Weight Used For Protein Calculations: 71.3 kg (157 lb 3 oz)     Fluid Need Method: other (see comments) (Per MD or 1 mL/kcal)  RDA Method (mL): 1861    Nutrition Prescription Ordered    Current Diet Order: NPO  Current Nutrition Support Formula Ordered: Isosource 1.5  Current Nutrition Support Rate Ordered: 50 (ml)  Current Nutrition Support Frequency Ordered: mL/hr    Evaluation of Received Nutrient/Fluid Intake    Enteral Calories (kcal): 1800  Enteral Protein (gm): 82  Enteral (Free Water) Fluid (mL): 917  Free Water Flush Fluid (mL): 1800  % Kcal Needs: 97%  % Protein Needs: 100%  Energy Calories Required: meeting needs  Protein Required: meeting needs  Fluid Required: other (see comments) (Per MD or 1 mL/kcal)  Comments: LBM 7/1  Tolerance: tolerating     % Intake of Estimated Energy Needs: 75 - 100 %  % Meal Intake: NPO    Nutrition Risk    Level of Risk/Frequency of Follow-up: low (f/u 1 x wk)     Assessment and Plan    Nutrition Problem  Dysphagia      Related to (etiology):   NPO status     Signs and Symptoms (as evidenced by):   Primary nutrition via PEG     Nutrition Diagnosis Status:   Continues     Monitor and Evaluation    Food and Nutrient Intake: enteral nutrition intake  Food and Nutrient Adminstration: enteral and parenteral nutrition administration  Physical Activity and Function: nutrition-related ADLs and IADLs  Anthropometric Measurements: weight, weight change  Biochemical Data, Medical Tests and Procedures: lipid profile, inflammatory profile, electrolyte and renal panel, gastrointestinal profile, glucose/endocrine  profile  Nutrition-Focused Physical Findings: overall appearance     Nutrition Follow-Up    RD Follow-up?: Yes

## 2018-07-06 NOTE — PLAN OF CARE
07/06/18 1339   Discharge Reassessment   Assessment Type Discharge Planning Reassessment   Do you have any problems affording any of your prescribed medications? No   Discharge Plan A Home Health   Discharge Plan B Skilled Nursing Facility   Can the patient answer the patient profile reliably? Yes, cognitively intact   How does the patient rate their overall health at the present time? Good   Describe the patient's ability to walk at the present time. Minor restrictions or changes   How often would a person be available to care for the patient? Whenever needed   Number of comorbid conditions (as recorded on the chart) Three   During the past month, has the patient often been bothered by feeling down, depressed or hopeless? No   During the past month, has the patient often been bothered by little interest or pleasure in doing things? No

## 2018-07-06 NOTE — NURSING
.Plan of care reviewed and updated. Pt AA+O. Pt's pain is managed with the medication ordered at this time. Pt's VS are as charted.  No falls this shift. Pt is oriented to room and call system. Will continue to montior.

## 2018-07-07 PROBLEM — R79.1 SUBTHERAPEUTIC INTERNATIONAL NORMALIZED RATIO (INR): Status: ACTIVE | Noted: 2018-07-07

## 2018-07-07 PROBLEM — E87.0 HYPERNATREMIA: Status: RESOLVED | Noted: 2018-06-23 | Resolved: 2018-07-07

## 2018-07-07 LAB
ALBUMIN SERPL BCP-MCNC: 2.3 G/DL
ALP SERPL-CCNC: 88 U/L
ALT SERPL W/O P-5'-P-CCNC: 17 U/L
ANION GAP SERPL CALC-SCNC: 4 MMOL/L
AST SERPL-CCNC: 13 U/L
BASOPHILS # BLD AUTO: 0.02 K/UL
BASOPHILS NFR BLD: 0.4 %
BILIRUB SERPL-MCNC: 0.3 MG/DL
BUN SERPL-MCNC: 29 MG/DL
CALCIUM SERPL-MCNC: 8.5 MG/DL
CHLORIDE SERPL-SCNC: 113 MMOL/L
CO2 SERPL-SCNC: 25 MMOL/L
CREAT SERPL-MCNC: 1.4 MG/DL
DIFFERENTIAL METHOD: ABNORMAL
EOSINOPHIL # BLD AUTO: 0.5 K/UL
EOSINOPHIL NFR BLD: 9.1 %
ERYTHROCYTE [DISTWIDTH] IN BLOOD BY AUTOMATED COUNT: 16 %
EST. GFR  (AFRICAN AMERICAN): 59.3 ML/MIN/1.73 M^2
EST. GFR  (NON AFRICAN AMERICAN): 51.3 ML/MIN/1.73 M^2
FACT X PPP CHRO-ACNC: 0.42 IU/ML
GLUCOSE SERPL-MCNC: 103 MG/DL
HCT VFR BLD AUTO: 24.2 %
HGB BLD-MCNC: 7.6 G/DL
IMM GRANULOCYTES # BLD AUTO: 0.01 K/UL
IMM GRANULOCYTES NFR BLD AUTO: 0.2 %
INR PPP: 1.2
LYMPHOCYTES # BLD AUTO: 0.8 K/UL
LYMPHOCYTES NFR BLD: 16 %
MCH RBC QN AUTO: 30.2 PG
MCHC RBC AUTO-ENTMCNC: 31.4 G/DL
MCV RBC AUTO: 96 FL
MONOCYTES # BLD AUTO: 0.5 K/UL
MONOCYTES NFR BLD: 10.1 %
NEUTROPHILS # BLD AUTO: 3.3 K/UL
NEUTROPHILS NFR BLD: 64.2 %
NRBC BLD-RTO: 0 /100 WBC
PLATELET # BLD AUTO: 136 K/UL
PMV BLD AUTO: 12.2 FL
POCT GLUCOSE: 110 MG/DL (ref 70–110)
POCT GLUCOSE: 112 MG/DL (ref 70–110)
POTASSIUM SERPL-SCNC: 4.7 MMOL/L
PROT SERPL-MCNC: 5.3 G/DL
PROTHROMBIN TIME: 12.3 SEC
RBC # BLD AUTO: 2.52 M/UL
SODIUM SERPL-SCNC: 142 MMOL/L
WBC # BLD AUTO: 5.06 K/UL

## 2018-07-07 PROCEDURE — 85520 HEPARIN ASSAY: CPT

## 2018-07-07 PROCEDURE — 99232 SBSQ HOSP IP/OBS MODERATE 35: CPT | Mod: ,,, | Performed by: HOSPITALIST

## 2018-07-07 PROCEDURE — 85610 PROTHROMBIN TIME: CPT

## 2018-07-07 PROCEDURE — 25000003 PHARM REV CODE 250: Performed by: HOSPITALIST

## 2018-07-07 PROCEDURE — 11000001 HC ACUTE MED/SURG PRIVATE ROOM

## 2018-07-07 PROCEDURE — 80053 COMPREHEN METABOLIC PANEL: CPT

## 2018-07-07 PROCEDURE — 85025 COMPLETE CBC W/AUTO DIFF WBC: CPT

## 2018-07-07 PROCEDURE — 63600175 PHARM REV CODE 636 W HCPCS: Performed by: HOSPITALIST

## 2018-07-07 PROCEDURE — 25000003 PHARM REV CODE 250: Performed by: INTERNAL MEDICINE

## 2018-07-07 RX ORDER — WARFARIN 1 MG/1
4 TABLET ORAL ONCE
Status: COMPLETED | OUTPATIENT
Start: 2018-07-07 | End: 2018-07-07

## 2018-07-07 RX ORDER — WARFARIN 1 MG/1
3 TABLET ORAL DAILY
Status: DISCONTINUED | OUTPATIENT
Start: 2018-07-08 | End: 2018-07-08

## 2018-07-07 RX ADMIN — TAMSULOSIN HYDROCHLORIDE 0.4 MG: 0.4 CAPSULE ORAL at 09:07

## 2018-07-07 RX ADMIN — MIRTAZAPINE 30 MG: 15 TABLET, FILM COATED ORAL at 09:07

## 2018-07-07 RX ADMIN — CEFTRIAXONE SODIUM 2 G: 2 INJECTION, POWDER, FOR SOLUTION INTRAMUSCULAR; INTRAVENOUS at 09:07

## 2018-07-07 RX ADMIN — PANTOPRAZOLE SODIUM 40 MG: 40 GRANULE, DELAYED RELEASE ORAL at 09:07

## 2018-07-07 RX ADMIN — AMPICILLIN 2 G: 2 INJECTION, POWDER, FOR SOLUTION INTRAVENOUS at 08:07

## 2018-07-07 RX ADMIN — WARFARIN SODIUM 4 MG: 1 TABLET ORAL at 08:07

## 2018-07-07 RX ADMIN — POTASSIUM CHLORIDE 20 MEQ: 20 SOLUTION ORAL at 09:07

## 2018-07-07 RX ADMIN — FUROSEMIDE 40 MG: 40 TABLET ORAL at 09:07

## 2018-07-07 RX ADMIN — ASPIRIN 81 MG CHEWABLE TABLET 81 MG: 81 TABLET CHEWABLE at 09:07

## 2018-07-07 RX ADMIN — AMPICILLIN 2 G: 2 INJECTION, POWDER, FOR SOLUTION INTRAVENOUS at 01:07

## 2018-07-07 RX ADMIN — METOPROLOL SUCCINATE 12.5 MG: 25 TABLET, EXTENDED RELEASE ORAL at 09:07

## 2018-07-07 RX ADMIN — AMIODARONE HYDROCHLORIDE 200 MG: 200 TABLET ORAL at 09:07

## 2018-07-07 RX ADMIN — AMLODIPINE BESYLATE 10 MG: 10 TABLET ORAL at 09:07

## 2018-07-07 RX ADMIN — CEFTRIAXONE SODIUM 2 G: 2 INJECTION, POWDER, FOR SOLUTION INTRAMUSCULAR; INTRAVENOUS at 10:07

## 2018-07-07 RX ADMIN — CETIRIZINE HYDROCHLORIDE 5 MG: 5 TABLET, FILM COATED ORAL at 09:07

## 2018-07-07 RX ADMIN — AMPICILLIN 2 G: 2 INJECTION, POWDER, FOR SOLUTION INTRAVENOUS at 02:07

## 2018-07-07 RX ADMIN — HEPARIN SODIUM 17 UNITS/KG/HR: 10000 INJECTION, SOLUTION INTRAVENOUS at 05:07

## 2018-07-07 RX ADMIN — AMPICILLIN 2 G: 2 INJECTION, POWDER, FOR SOLUTION INTRAVENOUS at 10:07

## 2018-07-07 RX ADMIN — ATORVASTATIN CALCIUM 40 MG: 20 TABLET, FILM COATED ORAL at 09:07

## 2018-07-07 NOTE — PROGRESS NOTES
[facility] Hospital Medicine  Progress Note    Patient Name: Rodolfo Messina  MRN: [id number]  Patient Class: IP- Inpatient   Admission Date: 6/29/2018  Length of Stay: 7 days  Attending Physician: Deejay Osman MD  Primary Care Provider: Deric Claudio MD    [facility] Medicine Team: [facility] HOSP MED G Deejay Osman MD    Subjective:     Principal Problem:Symptomatic anemia    HPI:  No notes on file    Hospital Course:  No notes on file    Interval History: no hematuria; cough mostly prominent when in room; no cough noted when patient is outside; INR still subtherapeutic     Review of Systems   Constitutional: Negative for chills, fatigue and fever.   Respiratory: Negative for cough and shortness of breath.    Cardiovascular: Negative for chest pain and palpitations.   Gastrointestinal: Negative for abdominal pain.   Genitourinary:        Hematuria resolved; able to urinate      Objective:     Vital:  Reviewed and stable    Physical Exam   Constitutional: He is oriented to person, place, and time. He appears well-developed and well-nourished. No distress.   Sitting up comfortably    Cardiovascular: Normal rate and regular rhythm.    Pulmonary/Chest: Effort normal and breath sounds normal.   Abdominal: Soft. Bowel sounds are normal.   Genitourinary:   Genitourinary Comments: Noted yellow urine; no hematuria    Neurological: He is alert and oriented to person, place, and time.   Skin:   Healing cabg scars    Vitals reviewed.      Significant Labs: All pertinent labs within the past 24 hours have been reviewed.    Significant Imaging: I have reviewed all pertinent imaging results/findings within the past 24 hours.    Assessment/Plan:      * Symptomatic anemia    Previous studies c/w AOCD.  Hb 6.6 on presentation, s/p 2 units.  Likely 2/2 hematuria but now stable  H&H slowly down trending but not in need of transfusion; continue to monitor   Resume home dose ASA at 81 mg   Continue warfarin but  will bridge with heparin gtt given new valve although bioprosthetic   Continue to monitor                       Hematuria    - resolved now  - follow up with urology           Dysphagia    NPO. Resume PEG feeds.             S/P MVR (mitral valve replacement)    Monitoring INRs with resumption of warfarin           S/P CABG x 1    Resumed home ASA at 325 mg and monitor   Continue medical therapy           Chronic systolic congestive heart failure    - no acute issues  - continue medical management           Subacute bacterial endocarditis    - remains afebrile and no leukocytosis  - cough predated the antibiotics   - ID recommended resumption of antibiotics            VTE Risk Mitigation         Ordered     warfarin (COUMADIN) tablet 3 mg  Daily      07/07/18 0837     warfarin (COUMADIN) tablet 4 mg  Once      07/07/18 0837     heparin 25,000 units in dextrose 5% 250 mL (100 units/mL) infusion  Continuous      07/06/18 0921     heparin 25,000 units in dextrose 5% 250 mL (100 units/mL) bolus from bag; ADDITIONAL PRN BOLUS  As needed (PRN)      07/06/18 0922     heparin 25,000 units in dextrose 5% 250 mL (100 units/mL) bolus from bag; ADDITIONAL PRN BOLUS  As needed (PRN)      07/06/18 0922              Deejay Osman MD  Department of Hospital Medicine   [facility]

## 2018-07-07 NOTE — ASSESSMENT & PLAN NOTE
- continue heparin gtt; opted to bridge given prolonged period of subtherapeutic readings in the setting of new valve although bioprosthetic  - give 4 mg dose today and resume 3 mg daily tomorrow

## 2018-07-07 NOTE — SUBJECTIVE & OBJECTIVE
Interval History: no hematuria; cough mostly prominent when in room; no cough noted when patient is outside; INR still subtherapeutic     Review of Systems   Constitutional: Negative for chills, fatigue and fever.   Respiratory: Negative for cough and shortness of breath.    Cardiovascular: Negative for chest pain and palpitations.   Gastrointestinal: Negative for abdominal pain.   Genitourinary:        Hematuria resolved; able to urinate      Objective:     Vital:  Reviewed and stable    Physical Exam   Constitutional: He is oriented to person, place, and time. He appears well-developed and well-nourished. No distress.   Sitting up comfortably    Cardiovascular: Normal rate and regular rhythm.    Pulmonary/Chest: Effort normal and breath sounds normal.   Abdominal: Soft. Bowel sounds are normal.   Genitourinary:   Genitourinary Comments: Noted yellow urine; no hematuria    Neurological: He is alert and oriented to person, place, and time.   Skin:   Healing cabg scars    Vitals reviewed.      Significant Labs: All pertinent labs within the past 24 hours have been reviewed.    Significant Imaging: I have reviewed all pertinent imaging results/findings within the past 24 hours.

## 2018-07-07 NOTE — ASSESSMENT & PLAN NOTE
- remains afebrile and no leukocytosis  - cough predated the antibiotics   - ID recommended resumption of antibiotics

## 2018-07-07 NOTE — ASSESSMENT & PLAN NOTE
- H&H slowly down trending but no reports of bleeding  - will continue to monitor and transfuse as needed

## 2018-07-07 NOTE — ASSESSMENT & PLAN NOTE
Previous studies c/w AOCD.  Hb 6.6 on presentation, s/p 2 units.  Likely 2/2 hematuria but now stable  H&H slowly down trending but not in need of transfusion; continue to monitor   Resume home dose ASA at 81 mg   Continue warfarin but will bridge with heparin gtt given new valve although bioprosthetic   Continue to monitor

## 2018-07-08 PROBLEM — N18.30 CKD (CHRONIC KIDNEY DISEASE) STAGE 3, GFR 30-59 ML/MIN: Status: ACTIVE | Noted: 2018-07-08

## 2018-07-08 LAB
ALBUMIN SERPL BCP-MCNC: 2.3 G/DL
ALP SERPL-CCNC: 84 U/L
ALT SERPL W/O P-5'-P-CCNC: 16 U/L
ANION GAP SERPL CALC-SCNC: 6 MMOL/L
AST SERPL-CCNC: 15 U/L
BASOPHILS # BLD AUTO: 0.02 K/UL
BASOPHILS NFR BLD: 0.4 %
BILIRUB SERPL-MCNC: 0.3 MG/DL
BILIRUB UR QL STRIP: NEGATIVE
BUN SERPL-MCNC: 29 MG/DL
CALCIUM SERPL-MCNC: 8.2 MG/DL
CHLORIDE SERPL-SCNC: 109 MMOL/L
CLARITY UR REFRACT.AUTO: CLEAR
CO2 SERPL-SCNC: 24 MMOL/L
COLOR UR AUTO: NORMAL
CREAT SERPL-MCNC: 1.4 MG/DL
DIFFERENTIAL METHOD: ABNORMAL
EOSINOPHIL # BLD AUTO: 0.5 K/UL
EOSINOPHIL NFR BLD: 9.5 %
ERYTHROCYTE [DISTWIDTH] IN BLOOD BY AUTOMATED COUNT: 16.1 %
EST. GFR  (AFRICAN AMERICAN): 59.3 ML/MIN/1.73 M^2
EST. GFR  (NON AFRICAN AMERICAN): 51.3 ML/MIN/1.73 M^2
FACT X PPP CHRO-ACNC: 0.23 IU/ML
FACT X PPP CHRO-ACNC: 0.36 IU/ML
FACT X PPP CHRO-ACNC: 0.84 IU/ML
GLUCOSE SERPL-MCNC: 105 MG/DL
GLUCOSE UR QL STRIP: NEGATIVE
HCT VFR BLD AUTO: 23.1 %
HGB BLD-MCNC: 7.3 G/DL
HGB UR QL STRIP: NEGATIVE
IMM GRANULOCYTES # BLD AUTO: 0.01 K/UL
IMM GRANULOCYTES NFR BLD AUTO: 0.2 %
INR PPP: 1.2
KETONES UR QL STRIP: NEGATIVE
LEUKOCYTE ESTERASE UR QL STRIP: NEGATIVE
LYMPHOCYTES # BLD AUTO: 0.9 K/UL
LYMPHOCYTES NFR BLD: 19.2 %
MCH RBC QN AUTO: 30.5 PG
MCHC RBC AUTO-ENTMCNC: 31.6 G/DL
MCV RBC AUTO: 97 FL
MONOCYTES # BLD AUTO: 0.5 K/UL
MONOCYTES NFR BLD: 10.5 %
NEUTROPHILS # BLD AUTO: 2.9 K/UL
NEUTROPHILS NFR BLD: 60.2 %
NITRITE UR QL STRIP: NEGATIVE
NRBC BLD-RTO: 0 /100 WBC
PH UR STRIP: 7 [PH] (ref 5–8)
PLATELET # BLD AUTO: 134 K/UL
PMV BLD AUTO: 13 FL
POCT GLUCOSE: 123 MG/DL (ref 70–110)
POTASSIUM SERPL-SCNC: 4.5 MMOL/L
PROT SERPL-MCNC: 5.3 G/DL
PROT UR QL STRIP: NEGATIVE
PROTHROMBIN TIME: 12.4 SEC
RBC # BLD AUTO: 2.39 M/UL
SODIUM SERPL-SCNC: 139 MMOL/L
SP GR UR STRIP: 1.01 (ref 1–1.03)
URN SPEC COLLECT METH UR: NORMAL
UROBILINOGEN UR STRIP-ACNC: NEGATIVE EU/DL
WBC # BLD AUTO: 4.74 K/UL

## 2018-07-08 PROCEDURE — 25000003 PHARM REV CODE 250: Performed by: HOSPITALIST

## 2018-07-08 PROCEDURE — 36415 COLL VENOUS BLD VENIPUNCTURE: CPT

## 2018-07-08 PROCEDURE — 85610 PROTHROMBIN TIME: CPT

## 2018-07-08 PROCEDURE — 82272 OCCULT BLD FECES 1-3 TESTS: CPT

## 2018-07-08 PROCEDURE — 85520 HEPARIN ASSAY: CPT | Mod: 91

## 2018-07-08 PROCEDURE — 11000001 HC ACUTE MED/SURG PRIVATE ROOM

## 2018-07-08 PROCEDURE — 80053 COMPREHEN METABOLIC PANEL: CPT

## 2018-07-08 PROCEDURE — 99232 SBSQ HOSP IP/OBS MODERATE 35: CPT | Mod: ,,, | Performed by: HOSPITALIST

## 2018-07-08 PROCEDURE — 85025 COMPLETE CBC W/AUTO DIFF WBC: CPT

## 2018-07-08 PROCEDURE — 94761 N-INVAS EAR/PLS OXIMETRY MLT: CPT

## 2018-07-08 PROCEDURE — 81003 URINALYSIS AUTO W/O SCOPE: CPT

## 2018-07-08 PROCEDURE — 63600175 PHARM REV CODE 636 W HCPCS: Performed by: HOSPITALIST

## 2018-07-08 PROCEDURE — 25000003 PHARM REV CODE 250: Performed by: INTERNAL MEDICINE

## 2018-07-08 PROCEDURE — 25000003 PHARM REV CODE 250: Performed by: NURSE PRACTITIONER

## 2018-07-08 RX ORDER — WARFARIN 1 MG/1
3 TABLET ORAL DAILY
Status: DISCONTINUED | OUTPATIENT
Start: 2018-07-09 | End: 2018-07-09

## 2018-07-08 RX ORDER — WARFARIN SODIUM 5 MG/1
5 TABLET ORAL ONCE
Status: COMPLETED | OUTPATIENT
Start: 2018-07-08 | End: 2018-07-08

## 2018-07-08 RX ADMIN — CETIRIZINE HYDROCHLORIDE 5 MG: 5 TABLET, FILM COATED ORAL at 09:07

## 2018-07-08 RX ADMIN — CEFTRIAXONE SODIUM 2 G: 2 INJECTION, POWDER, FOR SOLUTION INTRAMUSCULAR; INTRAVENOUS at 09:07

## 2018-07-08 RX ADMIN — METOPROLOL SUCCINATE 12.5 MG: 25 TABLET, EXTENDED RELEASE ORAL at 09:07

## 2018-07-08 RX ADMIN — MIRTAZAPINE 30 MG: 15 TABLET, FILM COATED ORAL at 09:07

## 2018-07-08 RX ADMIN — HEPARIN SODIUM 17 UNITS/KG/HR: 10000 INJECTION, SOLUTION INTRAVENOUS at 10:07

## 2018-07-08 RX ADMIN — ASPIRIN 81 MG CHEWABLE TABLET 81 MG: 81 TABLET CHEWABLE at 09:07

## 2018-07-08 RX ADMIN — AMPICILLIN 2 G: 2 INJECTION, POWDER, FOR SOLUTION INTRAVENOUS at 04:07

## 2018-07-08 RX ADMIN — WARFARIN SODIUM 5 MG: 5 TABLET ORAL at 05:07

## 2018-07-08 RX ADMIN — HEPARIN SODIUM 17 UNITS/KG/HR: 10000 INJECTION, SOLUTION INTRAVENOUS at 05:07

## 2018-07-08 RX ADMIN — POTASSIUM CHLORIDE 20 MEQ: 20 SOLUTION ORAL at 09:07

## 2018-07-08 RX ADMIN — FUROSEMIDE 40 MG: 40 TABLET ORAL at 09:07

## 2018-07-08 RX ADMIN — GUAIFENESIN 200 MG: 200 SOLUTION ORAL at 10:07

## 2018-07-08 RX ADMIN — TAMSULOSIN HYDROCHLORIDE 0.4 MG: 0.4 CAPSULE ORAL at 09:07

## 2018-07-08 RX ADMIN — AMPICILLIN 2 G: 2 INJECTION, POWDER, FOR SOLUTION INTRAVENOUS at 10:07

## 2018-07-08 RX ADMIN — ATORVASTATIN CALCIUM 40 MG: 20 TABLET, FILM COATED ORAL at 09:07

## 2018-07-08 RX ADMIN — AMPICILLIN 2 G: 2 INJECTION, POWDER, FOR SOLUTION INTRAVENOUS at 05:07

## 2018-07-08 RX ADMIN — AMIODARONE HYDROCHLORIDE 200 MG: 200 TABLET ORAL at 09:07

## 2018-07-08 RX ADMIN — PANTOPRAZOLE SODIUM 40 MG: 40 GRANULE, DELAYED RELEASE ORAL at 09:07

## 2018-07-08 NOTE — SUBJECTIVE & OBJECTIVE
Interval History: no hematuria; INR still low; no complaints; ambulating well and sitting outside at times     Review of Systems   Constitutional: Negative for chills, fatigue and fever.   Respiratory: Negative for cough and shortness of breath.    Cardiovascular: Negative for chest pain and palpitations.   Gastrointestinal: Negative for abdominal pain.   Genitourinary: Negative for hematuria.     Objective:     Vital:  Reviewed and stable    Physical Exam   Constitutional: He is oriented to person, place, and time. He appears well-developed and well-nourished. No distress.   Ambulating in the halls without difficulty   Cardiovascular: Normal rate.    Pulmonary/Chest: Effort normal.   Abdominal: Soft. Bowel sounds are normal.   Genitourinary:   Genitourinary Comments: Noted yellow urine; no hematuria    Neurological: He is alert and oriented to person, place, and time.   Skin:   Healing cabg scars    Vitals reviewed.      Significant Labs: All pertinent labs within the past 24 hours have been reviewed.    Significant Imaging: I have reviewed all pertinent imaging results/findings within the past 24 hours.

## 2018-07-08 NOTE — ASSESSMENT & PLAN NOTE
- resolved now but since H&H has been down trending, will check a UA and consider urology consult as patient had cystoscopy scheduled for 7/10

## 2018-07-08 NOTE — PROGRESS NOTES
Pt AA0x3 and VSS.  Two side rails up, bed locked, call light within reach. No falls noted as these precautions remain. Hourly rounds made and no complaints at this time noted. Will resume with plan of care.

## 2018-07-08 NOTE — PROGRESS NOTES
[facility] Hospital Medicine  Progress Note    Patient Name: Rodolfo Messina  MRN: [id number]  Patient Class: IP- Inpatient   Admission Date: 6/29/2018  Length of Stay: 8 days  Attending Physician: Deejay Osman MD  Primary Care Provider: Deric Claudio MD    [facility] Medicine Team: [facility] MED G Deejay Osman MD    Subjective:     Principal Problem:Symptomatic anemia    HPI:  No notes on file    Hospital Course:  No notes on file    Interval History: reports fleeting cramps at time in the abdomen but no diarrhea associated and occasional ttp over the left nipple; no other complaints at this time      Review of Systems   Constitutional: Negative for chills, fatigue and fever.   Respiratory: Negative for cough and shortness of breath.    Cardiovascular: Negative for chest pain and palpitations.   Gastrointestinal: Negative for abdominal pain.   Genitourinary: Negative for hematuria.     Objective:     Vital:  Reviewed and stable    Physical Exam   Constitutional: He is oriented to person, place, and time. He appears well-developed and well-nourished. No distress.   Cardiovascular: Normal rate.    Pulmonary/Chest: Effort normal.   Abdominal: Soft. Bowel sounds are normal.   No TTP over abdomen. PEG site clean.    Genitourinary:   Genitourinary Comments: Noted yellow urine; no hematuria    Neurological: He is alert and oriented to person, place, and time.   No ttp over left breast. No rashes.    Skin:   Healing cabg scars    Vitals reviewed.      Significant Labs: All pertinent labs within the past 24 hours have been reviewed.    Significant Imaging: I have reviewed all pertinent imaging results/findings within the past 24 hours.    Assessment/Plan:      * Symptomatic anemia    - H&H slowly down trending but no reports of bleeding  - check a UA for microscopic hematuria and also check fobt   - may consider transfusion tomorrow if H&H continues to drop below 7            CKD (chronic kidney  disease) stage 3, GFR 30-59 ml/min    - creatinine slowly improving   - monitor electrolytes with daily labs  - good UOP            Subtherapeutic international normalized ratio (INR)    - continue heparin gtt; opted to bridge given prolonged period of subtherapeutic readings in the setting of new valve although bioprosthetic  - give 5 mg dose today and resume 3 mg daily tomorrow           Rash of back              Hematuria    - resolved now but since H&H has been down trending, will check a UA and consider urology consult as patient had cystoscopy scheduled for 7/10            Dysphagia    - continue TFs  - to remain NPO per SLP             S/P MVR (mitral valve replacement)    Monitoring INRs with resumption of warfarin           S/P CABG x 1    Resumed home ASA at 325 mg and monitor   Continue medical therapy           Chronic systolic congestive heart failure    - no acute issues  - continue medical management           Subacute bacterial endocarditis    - remains afebrile and no leukocytosis  - cough predated the antibiotics   - ID recommended resumption of antibiotics; stop date 7/13            VTE Risk Mitigation         Ordered     warfarin (COUMADIN) tablet 3 mg  Daily      07/08/18 0938     warfarin (COUMADIN) tablet 5 mg  Once      07/08/18 0938     heparin 25,000 units in dextrose 5% 250 mL (100 units/mL) infusion  Continuous      07/06/18 0921     heparin 25,000 units in dextrose 5% 250 mL (100 units/mL) bolus from bag; ADDITIONAL PRN BOLUS  As needed (PRN)      07/06/18 0922     heparin 25,000 units in dextrose 5% 250 mL (100 units/mL) bolus from bag; ADDITIONAL PRN BOLUS  As needed (PRN)      07/06/18 0922              Deejay Osman MD  Department of Hospital Medicine   [facility]

## 2018-07-08 NOTE — ASSESSMENT & PLAN NOTE
- remains afebrile and no leukocytosis  - cough predated the antibiotics   - ID recommended resumption of antibiotics; stop date 7/13

## 2018-07-08 NOTE — PROGRESS NOTES
[facility] Hospital Medicine  Progress Note    Patient Name: Rodolfo Messina  MRN: [id number]  Patient Class: IP- Inpatient   Admission Date: 6/29/2018  Length of Stay: 7 days  Attending Physician: Deejay Osman MD  Primary Care Provider: Deric Claudio MD    [facility] Medicine Team: [facility]OSP MED G Deejay Osman MD    Subjective:     Principal Problem:Symptomatic anemia    HPI:  No notes on file    Hospital Course:  No notes on file    Interval History: no hematuria; INR still low; no complaints; ambulating well and sitting outside at times     Review of Systems   Constitutional: Negative for chills, fatigue and fever.   Respiratory: Negative for cough and shortness of breath.    Cardiovascular: Negative for chest pain and palpitations.   Gastrointestinal: Negative for abdominal pain.   Genitourinary: Negative for hematuria.     Objective:     Vital:  Reviewed and stable    Physical Exam   Constitutional: He is oriented to person, place, and time. He appears well-developed and well-nourished. No distress.   Ambulating in the halls without difficulty   Cardiovascular: Normal rate.    Pulmonary/Chest: Effort normal.   Abdominal: Soft. Bowel sounds are normal.   Genitourinary:   Genitourinary Comments: Noted yellow urine; no hematuria    Neurological: He is alert and oriented to person, place, and time.   Skin:   Healing cabg scars    Vitals reviewed.      Significant Labs: All pertinent labs within the past 24 hours have been reviewed.    Significant Imaging: I have reviewed all pertinent imaging results/findings within the past 24 hours.    Assessment/Plan:      * Symptomatic anemia    - H&H slowly down trending but no reports of bleeding  - will continue to monitor and transfuse as needed            Subtherapeutic international normalized ratio (INR)    - continue heparin gtt; opted to bridge given prolonged period of subtherapeutic readings in the setting of new valve although  bioprosthetic  - give 4 mg dose today and resume 3 mg daily tomorrow           Rash of back              Hematuria    - resolved now  - follow up with urology           Dysphagia    NPO. Resume PEG feeds.             S/P MVR (mitral valve replacement)    Monitoring INRs with resumption of warfarin           S/P CABG x 1    Resumed home ASA at 325 mg and monitor   Continue medical therapy           Chronic systolic congestive heart failure    - no acute issues  - continue medical management           Subacute bacterial endocarditis    - remains afebrile and no leukocytosis  - cough predated the antibiotics   - ID recommended resumption of antibiotics            VTE Risk Mitigation         Ordered     warfarin (COUMADIN) tablet 3 mg  Daily      07/07/18 0837     heparin 25,000 units in dextrose 5% 250 mL (100 units/mL) infusion  Continuous      07/06/18 0921     heparin 25,000 units in dextrose 5% 250 mL (100 units/mL) bolus from bag; ADDITIONAL PRN BOLUS  As needed (PRN)      07/06/18 0922     heparin 25,000 units in dextrose 5% 250 mL (100 units/mL) bolus from bag; ADDITIONAL PRN BOLUS  As needed (PRN)      07/06/18 0922              Deejay Osman MD  Department of Hospital Medicine   [facility]

## 2018-07-08 NOTE — SUBJECTIVE & OBJECTIVE
Interval History: reports fleeting cramps at time in the abdomen but no diarrhea associated and occasional ttp over the left nipple; no other complaints at this time      Review of Systems   Constitutional: Negative for chills, fatigue and fever.   Respiratory: Negative for cough and shortness of breath.    Cardiovascular: Negative for chest pain and palpitations.   Gastrointestinal: Negative for abdominal pain.   Genitourinary: Negative for hematuria.     Objective:     Vital:  Reviewed and stable    Physical Exam   Constitutional: He is oriented to person, place, and time. He appears well-developed and well-nourished. No distress.   Cardiovascular: Normal rate.    Pulmonary/Chest: Effort normal.   Abdominal: Soft. Bowel sounds are normal.   No TTP over abdomen. PEG site clean.    Genitourinary:   Genitourinary Comments: Noted yellow urine; no hematuria    Neurological: He is alert and oriented to person, place, and time.   No ttp over left breast. No rashes.    Skin:   Healing cabg scars    Vitals reviewed.      Significant Labs: All pertinent labs within the past 24 hours have been reviewed.    Significant Imaging: I have reviewed all pertinent imaging results/findings within the past 24 hours.

## 2018-07-08 NOTE — ASSESSMENT & PLAN NOTE
- H&H slowly down trending but no reports of bleeding  - check a UA for microscopic hematuria and also check fobt   - may consider transfusion tomorrow if H&H continues to drop below 7

## 2018-07-08 NOTE — ASSESSMENT & PLAN NOTE
- continue heparin gtt; opted to bridge given prolonged period of subtherapeutic readings in the setting of new valve although bioprosthetic  - give 5 mg dose today and resume 3 mg daily tomorrow

## 2018-07-09 PROBLEM — Z86.79 H/O ATRIAL FLUTTER: Status: ACTIVE | Noted: 2018-07-09

## 2018-07-09 PROBLEM — L89.92 PRESSURE INJURY OF SKIN, STAGE 2: Status: ACTIVE | Noted: 2018-07-09

## 2018-07-09 PROBLEM — I38: Status: ACTIVE | Noted: 2018-02-28

## 2018-07-09 LAB
ANION GAP SERPL CALC-SCNC: 8 MMOL/L
BASOPHILS # BLD AUTO: 0.03 K/UL
BASOPHILS NFR BLD: 0.6 %
BUN SERPL-MCNC: 25 MG/DL
CALCIUM SERPL-MCNC: 8.6 MG/DL
CHLORIDE SERPL-SCNC: 110 MMOL/L
CO2 SERPL-SCNC: 21 MMOL/L
CREAT SERPL-MCNC: 1.4 MG/DL
DIFFERENTIAL METHOD: ABNORMAL
EOSINOPHIL # BLD AUTO: 0.5 K/UL
EOSINOPHIL NFR BLD: 9.5 %
ERYTHROCYTE [DISTWIDTH] IN BLOOD BY AUTOMATED COUNT: 16.3 %
EST. GFR  (AFRICAN AMERICAN): 59.3 ML/MIN/1.73 M^2
EST. GFR  (NON AFRICAN AMERICAN): 51.3 ML/MIN/1.73 M^2
FACT X PPP CHRO-ACNC: 0.18 IU/ML
GLUCOSE SERPL-MCNC: 84 MG/DL
HCT VFR BLD AUTO: 24.1 %
HGB BLD-MCNC: 7.7 G/DL
IMM GRANULOCYTES # BLD AUTO: 0.02 K/UL
IMM GRANULOCYTES NFR BLD AUTO: 0.4 %
INR PPP: 1.3
LYMPHOCYTES # BLD AUTO: 1.1 K/UL
LYMPHOCYTES NFR BLD: 21.5 %
MAGNESIUM SERPL-MCNC: 1.8 MG/DL
MCH RBC QN AUTO: 30.9 PG
MCHC RBC AUTO-ENTMCNC: 32 G/DL
MCV RBC AUTO: 97 FL
MONOCYTES # BLD AUTO: 0.5 K/UL
MONOCYTES NFR BLD: 10.5 %
NEUTROPHILS # BLD AUTO: 2.8 K/UL
NEUTROPHILS NFR BLD: 57.5 %
NRBC BLD-RTO: 0 /100 WBC
OB PNL STL: POSITIVE
PHOSPHATE SERPL-MCNC: 2.8 MG/DL
PLATELET # BLD AUTO: 127 K/UL
PMV BLD AUTO: 12.5 FL
POCT GLUCOSE: 79 MG/DL (ref 70–110)
POTASSIUM SERPL-SCNC: 4.4 MMOL/L
PROTHROMBIN TIME: 13 SEC
RBC # BLD AUTO: 2.49 M/UL
SODIUM SERPL-SCNC: 139 MMOL/L
WBC # BLD AUTO: 4.93 K/UL

## 2018-07-09 PROCEDURE — 25000003 PHARM REV CODE 250: Performed by: INTERNAL MEDICINE

## 2018-07-09 PROCEDURE — 99232 SBSQ HOSP IP/OBS MODERATE 35: CPT | Mod: ,,, | Performed by: HOSPITALIST

## 2018-07-09 PROCEDURE — 85025 COMPLETE CBC W/AUTO DIFF WBC: CPT

## 2018-07-09 PROCEDURE — 97535 SELF CARE MNGMENT TRAINING: CPT

## 2018-07-09 PROCEDURE — 83735 ASSAY OF MAGNESIUM: CPT

## 2018-07-09 PROCEDURE — 85610 PROTHROMBIN TIME: CPT

## 2018-07-09 PROCEDURE — 85520 HEPARIN ASSAY: CPT

## 2018-07-09 PROCEDURE — 80048 BASIC METABOLIC PNL TOTAL CA: CPT

## 2018-07-09 PROCEDURE — 63600175 PHARM REV CODE 636 W HCPCS: Performed by: HOSPITALIST

## 2018-07-09 PROCEDURE — 25000003 PHARM REV CODE 250: Performed by: NURSE PRACTITIONER

## 2018-07-09 PROCEDURE — 25000003 PHARM REV CODE 250: Performed by: HOSPITALIST

## 2018-07-09 PROCEDURE — 84100 ASSAY OF PHOSPHORUS: CPT

## 2018-07-09 PROCEDURE — 92526 ORAL FUNCTION THERAPY: CPT

## 2018-07-09 PROCEDURE — 11000001 HC ACUTE MED/SURG PRIVATE ROOM

## 2018-07-09 PROCEDURE — 99233 SBSQ HOSP IP/OBS HIGH 50: CPT | Mod: GC,,, | Performed by: INTERNAL MEDICINE

## 2018-07-09 RX ORDER — ASPIRIN 81 MG/1
81 TABLET ORAL DAILY
Status: DISCONTINUED | OUTPATIENT
Start: 2018-07-10 | End: 2018-07-13 | Stop reason: HOSPADM

## 2018-07-09 RX ADMIN — GUAIFENESIN 200 MG: 200 SOLUTION ORAL at 11:07

## 2018-07-09 RX ADMIN — AMIODARONE HYDROCHLORIDE 200 MG: 200 TABLET ORAL at 09:07

## 2018-07-09 RX ADMIN — PANTOPRAZOLE SODIUM 40 MG: 40 GRANULE, DELAYED RELEASE ORAL at 09:07

## 2018-07-09 RX ADMIN — TAMSULOSIN HYDROCHLORIDE 0.4 MG: 0.4 CAPSULE ORAL at 09:07

## 2018-07-09 RX ADMIN — POTASSIUM CHLORIDE 20 MEQ: 20 SOLUTION ORAL at 10:07

## 2018-07-09 RX ADMIN — ATORVASTATIN CALCIUM 40 MG: 20 TABLET, FILM COATED ORAL at 09:07

## 2018-07-09 RX ADMIN — METOPROLOL SUCCINATE 12.5 MG: 25 TABLET, EXTENDED RELEASE ORAL at 09:07

## 2018-07-09 RX ADMIN — CEFTRIAXONE SODIUM 2 G: 2 INJECTION, POWDER, FOR SOLUTION INTRAMUSCULAR; INTRAVENOUS at 08:07

## 2018-07-09 RX ADMIN — AMPICILLIN 2 G: 2 INJECTION, POWDER, FOR SOLUTION INTRAVENOUS at 11:07

## 2018-07-09 RX ADMIN — ASPIRIN 81 MG CHEWABLE TABLET 81 MG: 81 TABLET CHEWABLE at 09:07

## 2018-07-09 RX ADMIN — MIRTAZAPINE 30 MG: 15 TABLET, FILM COATED ORAL at 10:07

## 2018-07-09 RX ADMIN — CETIRIZINE HYDROCHLORIDE 5 MG: 5 TABLET, FILM COATED ORAL at 09:07

## 2018-07-09 RX ADMIN — AMPICILLIN 2 G: 2 INJECTION, POWDER, FOR SOLUTION INTRAVENOUS at 05:07

## 2018-07-09 RX ADMIN — FUROSEMIDE 40 MG: 40 TABLET ORAL at 09:07

## 2018-07-09 RX ADMIN — AMLODIPINE BESYLATE 10 MG: 10 TABLET ORAL at 09:07

## 2018-07-09 RX ADMIN — POTASSIUM CHLORIDE 20 MEQ: 20 SOLUTION ORAL at 09:07

## 2018-07-09 RX ADMIN — CEFTRIAXONE SODIUM 2 G: 2 INJECTION, POWDER, FOR SOLUTION INTRAMUSCULAR; INTRAVENOUS at 10:07

## 2018-07-09 RX ADMIN — AMPICILLIN 2 G: 2 INJECTION, POWDER, FOR SOLUTION INTRAVENOUS at 12:07

## 2018-07-09 NOTE — SUBJECTIVE & OBJECTIVE
Interval History:   NPO since midnight  Denies hematuria past several days  No UTI symptoms  Endorses some intermittency  VSS  Currently on ceftriaxone  Hgb stable    Review of Systems  Objective:     Temp:  [96.8 °F (36 °C)-97.8 °F (36.6 °C)] 97.5 °F (36.4 °C)  Pulse:  [83-95] 84  Resp:  [16-18] 18  SpO2:  [93 %-96 %] 96 %  BP: (106-135)/(59-68) 109/62     Body mass index is 22.55 kg/m².       Bladder Scan Volume (mL): 35 mL (06/29/18 2200)  Post Void Cath Residual (mL): 35 mL (06/29/18 2200)    Drains     Drain                 Gastrostomy/Enterostomy LUQ -- days         Gastrostomy/Enterostomy 06/07/18 1657 Gastrostomy-jejunostomy LUQ 31 days         Gastrostomy/Enterostomy 06/29/18 2200 Gastrostomy-jejunostomy 9 days                Physical Exam   Constitutional: He is oriented to person, place, and time. He appears well-developed. No distress.   HENT:   Head: Normocephalic and atraumatic.   Eyes: No scleral icterus.   Neck: No JVD present.   Cardiovascular: Normal rate and regular rhythm.    Pulmonary/Chest: No respiratory distress.   Abdominal: Soft. He exhibits no distension. There is no tenderness. There is no rebound and no guarding.   PEG tube in place   Genitourinary: Penis normal. Right testis shows no mass, no swelling and no tenderness. Left testis shows no mass, no swelling and no tenderness. Uncircumcised. No paraphimosis.   Genitourinary Comments: Uncircumcised   Musculoskeletal:   Bed sore on sacrum   Neurological: He is alert and oriented to person, place, and time.   Skin: He is not diaphoretic. There is pallor.     Psychiatric: He has a normal mood and affect. His behavior is normal. Thought content normal.       Significant Labs:    BMP:    Recent Labs  Lab 07/07/18  0520 07/08/18  0601 07/09/18  0550    139 139   K 4.7 4.5 4.4   * 109 110   CO2 25 24 21*   BUN 29* 29* 25*   CREATININE 1.4 1.4 1.4   CALCIUM 8.5* 8.2* 8.6*       CBC:     Recent Labs  Lab 07/07/18  0520 07/08/18  0601  07/09/18  0550   WBC 5.06 4.74 4.93   HGB 7.6* 7.3* 7.7*   HCT 24.2* 23.1* 24.1*   * 134* 127*       Coagulation:   Recent Labs  Lab 07/07/18  0520 07/08/18  0601 07/09/18  0550   INR 1.2 1.2 1.3*     Urine Culture: No results for input(s): LABURIN in the last 168 hours.  Urine Studies:   Recent Labs  Lab 07/02/18  1317 07/08/18  1441   COLORU Yellow Straw   APPEARANCEUA Hazy* Clear   PHUR 6.0 7.0   SPECGRAV 1.010 1.010   PROTEINUA 1+* Negative   GLUCUA Negative Negative   KETONESU Negative Negative   BILIRUBINUA Negative Negative   OCCULTUA 3+* Negative   NITRITE Negative Negative   UROBILINOGEN Negative Negative   LEUKOCYTESUR Negative Negative   RBCUA >100*  --    WBCUA 72*  --    BACTERIA Rare  --    HYALINECASTS 0  --        Significant Imaging:  All pertinent imaging results/findings from the past 24 hours have been reviewed.

## 2018-07-09 NOTE — PROGRESS NOTES
[facility]  Urology  Progress Note    Patient Name: Rodolfo Messina  MRN: [id number]  Admission Date: 6/29/2018  Hospital Length of Stay: 9 days  Code Status: Full Code   Attending Provider: Veronica Ricketts MD  Primary Care Physician: Deric Claudio MD    Subjective:     HPI:  Rodolfo Messina is a 68 y.o. male with hx of HFrEF, MR s/p MVR (5/2018), CAD s/p CABG (5/2018), dysphagia s/p PEG, hypernatremia, anemia, SBE on rocephin / amp.     He presented to the ED last night with complaints of hematuria and non-functioning PEG tube. He was seen in clinic by Pushpa Gomes on 6/26 for lackey removal and passed a voiding trial at that time. His lackey was placed for urinary retention following MVR on 5/25. He then began urinating blood on 6/28 and has progressively worsened. He denies issues with urination at this time, feels as though he is emptying well with no straining or hesitancy. Denies dysuria, fevers or chills. He has had issues with urinary retention in the past and is on Cardura 4 mg via his PEG tube.     On presentation he was found to be anemic with an H/H of 6.6/22 for which he was given 2 units PRBC. His INR is elevated at 4.1 and his creatinine is 2.1 which is baseline. PVR last night was 35 cc.     Patient was recently admitted to [place] on 6/23 with anemia and required transfusion at that time. He has required multiple transfusions over the last 6 months however no source of bleeding has been identified.     Interval History:   NPO since midnight  Denies hematuria past several days  No UTI symptoms  Endorses some intermittency  VSS  Currently on ceftriaxone  Hgb stable    Review of Systems  Objective:     Temp:  [96.8 °F (36 °C)-97.8 °F (36.6 °C)] 97.5 °F (36.4 °C)  Pulse:  [83-95] 84  Resp:  [16-18] 18  SpO2:  [93 %-96 %] 96 %  BP: (106-135)/(59-68) 109/62     Body mass index is 22.55 kg/m².       Bladder Scan Volume (mL): 35 mL (06/29/18 2200)  Post Void Cath Residual (mL): 35  mL (06/29/18 2200)    Drains     Drain                 Gastrostomy/Enterostomy LUQ -- days         Gastrostomy/Enterostomy 06/07/18 1657 Gastrostomy-jejunostomy LUQ 31 days         Gastrostomy/Enterostomy 06/29/18 2200 Gastrostomy-jejunostomy 9 days                Physical Exam   Constitutional: He is oriented to person, place, and time. He appears well-developed. No distress.   HENT:   Head: Normocephalic and atraumatic.   Eyes: No scleral icterus.   Neck: No JVD present.   Cardiovascular: Normal rate and regular rhythm.    Pulmonary/Chest: No respiratory distress.   Abdominal: Soft. He exhibits no distension. There is no tenderness. There is no rebound and no guarding.   PEG tube in place   Genitourinary: Penis normal. Right testis shows no mass, no swelling and no tenderness. Left testis shows no mass, no swelling and no tenderness. Uncircumcised. No paraphimosis.   Genitourinary Comments: Uncircumcised   Musculoskeletal:   Bed sore on sacrum   Neurological: He is alert and oriented to person, place, and time.   Skin: He is not diaphoretic. There is pallor.     Psychiatric: He has a normal mood and affect. His behavior is normal. Thought content normal.       Significant Labs:    BMP:    Recent Labs  Lab 07/07/18  0520 07/08/18  0601 07/09/18  0550    139 139   K 4.7 4.5 4.4   * 109 110   CO2 25 24 21*   BUN 29* 29* 25*   CREATININE 1.4 1.4 1.4   CALCIUM 8.5* 8.2* 8.6*       CBC:     Recent Labs  Lab 07/07/18  0520 07/08/18  0601 07/09/18  0550   WBC 5.06 4.74 4.93   HGB 7.6* 7.3* 7.7*   HCT 24.2* 23.1* 24.1*   * 134* 127*       Coagulation:   Recent Labs  Lab 07/07/18  0520 07/08/18  0601 07/09/18  0550   INR 1.2 1.2 1.3*     Urine Culture: No results for input(s): LABURIN in the last 168 hours.  Urine Studies:   Recent Labs  Lab 07/02/18  1317 07/08/18  1441   COLORU Yellow Straw   APPEARANCEUA Hazy* Clear   PHUR 6.0 7.0   SPECGRAV 1.010 1.010   PROTEINUA 1+* Negative   GLUCUA Negative  Negative   KETONESU Negative Negative   BILIRUBINUA Negative Negative   OCCULTUA 3+* Negative   NITRITE Negative Negative   UROBILINOGEN Negative Negative   LEUKOCYTESUR Negative Negative   RBCUA >100*  --    WBCUA 72*  --    BACTERIA Rare  --    HYALINECASTS 0  --        Significant Imaging:  All pertinent imaging results/findings from the past 24 hours have been reviewed.                  Assessment/Plan:     Hematuria    Rodolfo Messina is a 68 y.o. male with gross hematuria following lackey removal with INR of 4.1 and symptomatic anemia    - Suspect hematuria was due to minor trauma with lackey removal in setting of supratherapeutic INR   - Patient has normal renal US from 2/2018 and normal CT RSS 7/9/18  - Plan for cysto/ bilateral retrograde pyelograms today in OR to complete gross hematuria workup  - Continue NPO today until procedure  - Do not suspect hematuria as source of patient's persistent anemia   - Continue cardura 4 mg             VTE Risk Mitigation         Ordered     warfarin (COUMADIN) tablet 3 mg  Daily      07/08/18 0938     heparin 25,000 units in dextrose 5% 250 mL (100 units/mL) infusion  Continuous      07/06/18 0921     heparin 25,000 units in dextrose 5% 250 mL (100 units/mL) bolus from bag; ADDITIONAL PRN BOLUS  As needed (PRN)      07/06/18 0922     heparin 25,000 units in dextrose 5% 250 mL (100 units/mL) bolus from bag; ADDITIONAL PRN BOLUS  As needed (PRN)      07/06/18 0922          Anuj Alberto MD  Urology  [facility]

## 2018-07-09 NOTE — PLAN OF CARE
Problem: SLP Goal  Goal: SLP Goal  Speech Language Pathology Goals  Goals expected to be met by 7/10/18:    1.  For therapeutic purposes only, pt will tolerate cup sips nectar thickened liquid with independent completion of double swallow per bolus with no overt clinical signs of aspiration.   2. Pt will ind'ly complete dysphagia exercises x10 each with good ability to strengthen the swallowing musculature.         Outcome: Ongoing (interventions implemented as appropriate)  Cont POC. IRVIN Patel, CCC-SLP  Speech Language Pathologist  [phone]  7/9/2018

## 2018-07-09 NOTE — CONSULTS
Received consult for bolus TF recommendations.      - Recommend TF regimen of Isosource 1.5 5 cans/day to provide 1875 kcal (100% EEN), 85 gm protein (100% EPN), and 955 mL water + additional 920 mL water for adequate hydration.     Currently following pt. Next follow-up: 7/13/18    Please re-consult if needed.

## 2018-07-09 NOTE — ASSESSMENT & PLAN NOTE
Rodolfo Messina is a 68 y.o. male with gross hematuria following lackey removal with INR of 4.1 and symptomatic anemia    - Suspect hematuria was due to minor trauma with lackey removal in setting of supratherapeutic INR   - Patient has normal renal US from 2/2018 and normal CT RSS 7/9/18  - Plan for cysto/ bilateral retrograde pyelograms today in OR to complete gross hematuria workup  - Continue NPO today until procedure  - Do not suspect hematuria as source of patient's persistent anemia   - Continue cardura 4 mg

## 2018-07-09 NOTE — PROGRESS NOTES
"Wound care consult received for pressure injury to buttocks.  Pt presents with Stage 2 to gluteal cleft with redness periwound.  Pt reports "discomfort" to area when laying on back and sitting up in chair. Pt spouse at bedside stating she was using Annie's butt paste at home and is using [employer] paste currently but feels pt wound with more redness periwound.  Discussed proper cleaning of paste from pt, explaining not to rub with force as not all paste may not lift. Discussed options of different ointment and pt and spouse would like to use [employer] clear and eliminate zinc oxide. In addition to [employer] clear ointment, will initiate pressure injury prevention interventions with ordering SPR pump to attach to existing mattress for TRACE and waffle cushion for chair. Reiterated the importance of repositioning. Discussed care with nurse.  Will follow up with pt prn  [id number]       07/09/18 1423       Pressure Injury 07/07/18 2000 Gluteal cleft Stage 2   Date First Assessed/Time First Assessed: 07/07/18 2000   Pressure Injury Present on Admission: yes  Location: Gluteal cleft  Is this injury device related?: No  Staging: Stage 2   Wound Image    Staging 2   Dressing Appearance No dressing   Drainage Amount None   Drainage Characteristics/Odor No odor   Appearance Pink   Tissue loss description Partial thickness   Periwound Area Redness   Wound Edges Open   Wound Length (cm) 0.7 cm   Wound Width (cm) 0.2 cm   Wound Depth (cm) 0.1 cm   Wound Volume (cm^3) 0.01 cm^3   Care Cleansed with:;Sterile normal saline;Applied:;Skin Barrier   Dressing Change Due 07/09/18     "

## 2018-07-09 NOTE — ASSESSMENT & PLAN NOTE
- H&H slowly down trending but no reports of bleeding; seems to have improved today  - no reports of bleeding but FOBT positive  - patient decline cystoscopy that was planned for today due to concerns with complication of urinary retention; can follow up outpatient   - appreciate GI recs; can offer a VCE on 7/11 but patient and wife expressed concerns for anesthesia given patient's recent complicated history and would like the ok from Dr. Go before proceeding  - case discussed with Dr. Go today regarding anticoagulation and because the patient has had several bleeding complications and is now two months out from his surgery, the risks of continuing with warfarin outweighs the benefits at this time; can continue EC ASA 81 mg daily; heparin and warfarin discontinued; patient's wife aware and will update patient  - continue pantoprazole   - no need for transfusion at this time  - hemodynamically stable

## 2018-07-09 NOTE — ASSESSMENT & PLAN NOTE
- occurred during hospitalization for CABG and MVR  - no DCCV due to atrial thrombus  - converted with meds; continue amiodarone   - HRs well controlled

## 2018-07-09 NOTE — CONSULTS
PharmD Consult received for:  1.) Renally adjust all medications:  · Scr = 1.4, CrCl = 51 ml/min  · All medications renally-adjusted, but will monitor closely as ampicillin may require adjustment depending on fluctuation of creatinine  3.) Warfarin dosing (home regimen: 2.5 mg PO M/Thurs/Sat, 5 mg all other days):   · Indication per CC notes: MV replacement/atrial flutter  · Goal INR per CC: 1.5-2.0  Lab Results   Component Value Date    INR 1.3 (H) 07/09/2018    INR 1.2 07/08/2018    INR 1.2 07/07/2018     · Agree with 3 mg dose of warfarin tonight. Continue bridge.  · INR daily  · Will follow      Thank you for the consult,    Asiya Avelar, PharmD  [id number]      **Note: Consults are reviewed Monday-Friday 7:30-4pm. The above recommendations are only suggested. The recommendations should be considered in conjunction with all patient factors.**

## 2018-07-09 NOTE — PT/OT/SLP PROGRESS
"Speech Language Pathology Treatment    Patient Name:  Rodolfo Messina   MRN:  [id number]  Admitting Diagnosis: Symptomatic anemia    Recommendations:                 General Recommendations:  Dysphagia therapy  Diet recommendations:  NPO, Liquid Diet Level: NPO   Aspiration Precautions:  Diet recommendations:  NPO, Liquid Diet Level: NPO   Aspiration Precautions: Continue alternate means of nutrition, Double swallow with each bite/sip, Puree for pleasure, Small bites/sips and Strict aspiration precautions   General Precautions: Standard, aspiration, fall, NPO  Communication strategies:  none    Subjective     "I've been doing a lot of swallowing." pt states he has been practicing performing his dysphagia exercises outside of ST.     Pain/Comfort:  ·      Objective:     Has the patient been evaluated by SLP for swallowing?   Yes  Keep patient NPO? Yes   Current Respiratory Status: room air      Pt seen sitting upright at bedside with wife present for part of session.  Pt was possibly undergoing procedure/testing later today which would require him to remain NPO; therefore, pureed PO trials were deferred, but nurse gave clearance for pt to receive PO trials of ice chips.  Pt accepted ice chips x 6 to facilitate production of swallows during dysphagia excs.  Pt produced 2-3 Effortful swallows per ice chips (15 Effortful swallows produced in total).  Throat clear x 1 present after 1st ice chip trial.  Pt also performed the Supraglottic swallow x 5, Falsetto exercise x 10, basic tongue base retraction exercises x 20, Nargis maneuver x 5/9 attempts, and Mendelsohn maneuver on 3/5 attempts.  Pt exhibiting strong volitional cough.  Extensive education provided to pt and wife regarding swallowing rehabilitation, dysphagia exercises, encouragement to perform dysphagia excs 3x daily outside of ST sessions, results and recommendations following most recent MBSS, pleasure feedings with pureed consistencies, recommendations to " repeat MBSS in a few weeks to determine if safe to increase PO intake, and SLP treatment plan.  Pt and wife expressing good understanding and motivation to improve swallowing function.  White board up to date.     Assessment:     Rodolfo Messina is a 68 y.o. male with an SLP diagnosis of Dysphagia.      Goals:    SLP Goals        Problem: SLP Goal    Goal Priority Disciplines Outcome   SLP Goal     SLP Ongoing (interventions implemented as appropriate)   Description:  Speech Language Pathology Goals  Goals expected to be met by 7/10/18:    1.  For therapeutic purposes only, pt will tolerate cup sips nectar thickened liquid with independent completion of double swallow per bolus with no overt clinical signs of aspiration.   2. Pt will ind'ly complete dysphagia exercises x10 each with good ability to strengthen the swallowing musculature.                          Plan:     · Patient to be seen:  3 x/week   · Plan of Care expires:  07/31/18  · Plan of Care reviewed with:  patient, spouse   · SLP Follow-Up:  Yes       Discharge recommendations:  home health speech therapy     Time Tracking:     SLP Treatment Date:   07/09/18  Speech Start Time:  1417  Speech Stop Time:  1507     Speech Total Time (min):  50 min    Billable Minutes: Treatment Swallowing Dysfunction 27 and Seld Care/Home Management Training 23    IRVIN Patel, CCC-SLP  07/09/2018     IRVIN Patel, CCC-SLP  Speech Language Pathologist  [phone]  7/9/2018

## 2018-07-09 NOTE — NURSING
Verbal order given from Dr Osman D/JOSIE Coumadin and Heparin and continue tube feeding at previous rates with water bolus.

## 2018-07-09 NOTE — PROGRESS NOTES
Pt AA0x3 and VSS.  Two side rails up, bed locked, call light within reach. No falls noted as these precautions remain. Pt moves well independently. Hourly rounds made and no complaints at this time noted. Will resume with plan of care.

## 2018-07-10 PROBLEM — E87.5 HYPERKALEMIA: Status: ACTIVE | Noted: 2018-07-10

## 2018-07-10 LAB
ANION GAP SERPL CALC-SCNC: 7 MMOL/L
BASOPHILS # BLD AUTO: 0.03 K/UL
BASOPHILS NFR BLD: 0.7 %
BUN SERPL-MCNC: 26 MG/DL
CALCIUM SERPL-MCNC: 8.8 MG/DL
CHLORIDE SERPL-SCNC: 111 MMOL/L
CO2 SERPL-SCNC: 23 MMOL/L
CREAT SERPL-MCNC: 1.5 MG/DL
DIFFERENTIAL METHOD: ABNORMAL
EOSINOPHIL # BLD AUTO: 0.4 K/UL
EOSINOPHIL NFR BLD: 9.2 %
ERYTHROCYTE [DISTWIDTH] IN BLOOD BY AUTOMATED COUNT: 16.4 %
EST. GFR  (AFRICAN AMERICAN): 54.5 ML/MIN/1.73 M^2
EST. GFR  (NON AFRICAN AMERICAN): 47.2 ML/MIN/1.73 M^2
FOLATE SERPL-MCNC: 13.2 NG/ML
GLUCOSE SERPL-MCNC: 106 MG/DL
HAPTOGLOB SERPL-MCNC: 180 MG/DL
HCT VFR BLD AUTO: 24.8 %
HGB BLD-MCNC: 7.7 G/DL
IMM GRANULOCYTES # BLD AUTO: 0.02 K/UL
IMM GRANULOCYTES NFR BLD AUTO: 0.5 %
INR PPP: 1.5
LDH SERPL L TO P-CCNC: 180 U/L
LYMPHOCYTES # BLD AUTO: 0.8 K/UL
LYMPHOCYTES NFR BLD: 17.5 %
MAGNESIUM SERPL-MCNC: 2.1 MG/DL
MCH RBC QN AUTO: 30.3 PG
MCHC RBC AUTO-ENTMCNC: 31 G/DL
MCV RBC AUTO: 98 FL
MONOCYTES # BLD AUTO: 0.5 K/UL
MONOCYTES NFR BLD: 11.1 %
NEUTROPHILS # BLD AUTO: 2.7 K/UL
NEUTROPHILS NFR BLD: 61 %
NRBC BLD-RTO: 0 /100 WBC
PHOSPHATE SERPL-MCNC: 3.2 MG/DL
PLATELET # BLD AUTO: 141 K/UL
PMV BLD AUTO: 12.3 FL
POCT GLUCOSE: 112 MG/DL (ref 70–110)
POCT GLUCOSE: 118 MG/DL (ref 70–110)
POCT GLUCOSE: 120 MG/DL (ref 70–110)
POTASSIUM SERPL-SCNC: 5.5 MMOL/L
PROTHROMBIN TIME: 14.7 SEC
RBC # BLD AUTO: 2.54 M/UL
SODIUM SERPL-SCNC: 141 MMOL/L
WBC # BLD AUTO: 4.34 K/UL

## 2018-07-10 PROCEDURE — 25000003 PHARM REV CODE 250: Performed by: INTERNAL MEDICINE

## 2018-07-10 PROCEDURE — 97535 SELF CARE MNGMENT TRAINING: CPT

## 2018-07-10 PROCEDURE — 25000003 PHARM REV CODE 250: Performed by: STUDENT IN AN ORGANIZED HEALTH CARE EDUCATION/TRAINING PROGRAM

## 2018-07-10 PROCEDURE — 84100 ASSAY OF PHOSPHORUS: CPT

## 2018-07-10 PROCEDURE — 25000003 PHARM REV CODE 250: Performed by: HOSPITALIST

## 2018-07-10 PROCEDURE — 83615 LACTATE (LD) (LDH) ENZYME: CPT

## 2018-07-10 PROCEDURE — 80048 BASIC METABOLIC PNL TOTAL CA: CPT

## 2018-07-10 PROCEDURE — 85025 COMPLETE CBC W/AUTO DIFF WBC: CPT

## 2018-07-10 PROCEDURE — 63600175 PHARM REV CODE 636 W HCPCS: Performed by: HOSPITALIST

## 2018-07-10 PROCEDURE — 82746 ASSAY OF FOLIC ACID SERUM: CPT

## 2018-07-10 PROCEDURE — 83010 ASSAY OF HAPTOGLOBIN QUANT: CPT

## 2018-07-10 PROCEDURE — 83735 ASSAY OF MAGNESIUM: CPT

## 2018-07-10 PROCEDURE — 99233 SBSQ HOSP IP/OBS HIGH 50: CPT | Mod: ,,, | Performed by: HOSPITALIST

## 2018-07-10 PROCEDURE — 85610 PROTHROMBIN TIME: CPT

## 2018-07-10 PROCEDURE — 92526 ORAL FUNCTION THERAPY: CPT

## 2018-07-10 PROCEDURE — 11000001 HC ACUTE MED/SURG PRIVATE ROOM

## 2018-07-10 PROCEDURE — 36415 COLL VENOUS BLD VENIPUNCTURE: CPT

## 2018-07-10 RX ORDER — POLYETHYLENE GLYCOL 3350, SODIUM SULFATE ANHYDROUS, SODIUM BICARBONATE, SODIUM CHLORIDE, POTASSIUM CHLORIDE 236; 22.74; 6.74; 5.86; 2.97 G/4L; G/4L; G/4L; G/4L; G/4L
2000 POWDER, FOR SOLUTION ORAL ONCE
Status: COMPLETED | OUTPATIENT
Start: 2018-07-10 | End: 2018-07-10

## 2018-07-10 RX ADMIN — POTASSIUM CHLORIDE 20 MEQ: 20 SOLUTION ORAL at 08:07

## 2018-07-10 RX ADMIN — ASPIRIN 81 MG: 81 TABLET, COATED ORAL at 08:07

## 2018-07-10 RX ADMIN — METOPROLOL SUCCINATE 12.5 MG: 25 TABLET, EXTENDED RELEASE ORAL at 08:07

## 2018-07-10 RX ADMIN — MIRTAZAPINE 30 MG: 15 TABLET, FILM COATED ORAL at 09:07

## 2018-07-10 RX ADMIN — CEFTRIAXONE SODIUM 2 G: 2 INJECTION, POWDER, FOR SOLUTION INTRAMUSCULAR; INTRAVENOUS at 08:07

## 2018-07-10 RX ADMIN — AMPICILLIN 2 G: 2 INJECTION, POWDER, FOR SOLUTION INTRAVENOUS at 05:07

## 2018-07-10 RX ADMIN — POTASSIUM CHLORIDE 20 MEQ: 20 SOLUTION ORAL at 09:07

## 2018-07-10 RX ADMIN — AMLODIPINE BESYLATE 10 MG: 10 TABLET ORAL at 08:07

## 2018-07-10 RX ADMIN — FUROSEMIDE 40 MG: 40 TABLET ORAL at 08:07

## 2018-07-10 RX ADMIN — AMPICILLIN SODIUM 2 G: 2 INJECTION, POWDER, FOR SOLUTION INTRAVENOUS at 04:07

## 2018-07-10 RX ADMIN — ATORVASTATIN CALCIUM 40 MG: 20 TABLET, FILM COATED ORAL at 08:07

## 2018-07-10 RX ADMIN — CEFTRIAXONE SODIUM 2 G: 2 INJECTION, POWDER, FOR SOLUTION INTRAMUSCULAR; INTRAVENOUS at 06:07

## 2018-07-10 RX ADMIN — TAMSULOSIN HYDROCHLORIDE 0.4 MG: 0.4 CAPSULE ORAL at 08:07

## 2018-07-10 RX ADMIN — PANTOPRAZOLE SODIUM 40 MG: 40 GRANULE, DELAYED RELEASE ORAL at 08:07

## 2018-07-10 RX ADMIN — AMIODARONE HYDROCHLORIDE 200 MG: 200 TABLET ORAL at 08:07

## 2018-07-10 RX ADMIN — POLYETHYLENE GLYCOL 3350, SODIUM SULFATE ANHYDROUS, SODIUM BICARBONATE, SODIUM CHLORIDE, POTASSIUM CHLORIDE 2000 ML: 236; 22.74; 6.74; 5.86; 2.97 POWDER, FOR SOLUTION ORAL at 09:07

## 2018-07-10 RX ADMIN — CETIRIZINE HYDROCHLORIDE 5 MG: 5 TABLET, FILM COATED ORAL at 08:07

## 2018-07-10 NOTE — CONSULTS
[facility]  Gastroenterology  Consult Note    Patient Name: Rodolfo Messina  MRN: [id number]  Admission Date: 6/29/2018  Hospital Length of Stay: 9 days  Code Status: Full Code   Attending Provider: Deeajy Bolden MD   Consulting Provider: Huang Giles MD  Primary Care Physician: Deric Claudio MD  Principal Problem:Symptomatic anemia    Inpatient consult to Gastroenterology  Consult performed by: HUANG GILES  Consult ordered by: DEEJAY BOLDEN        Subjective:     HPI:  Mr. Messina is a 69 y/o male with past medical history of CAD and MR s/p CABG with MVR on 5/2018, HFrEF, chronic anemia, presented with hematuria after lackey was removed on anti-coagulation.  The patient was admitted ~10 days ago and had management of hematuria. He required transfusion on admission, but remained relatively stable since then. His hematuria improved significantly.   GI was consulted today with concern for slowly declining H/H in the setting of need for anti-coagulation, and an FOBT positive.  Patient denies abdominal pain, N/V, hematemesis, coffee ground emesis, melena, hematochezia, or diarrhea. Denies history of GI bleeding in the past. Denies dizziness, HAQ.  Of note, the patient underwent workup of GI bleeding as an outpatient for chronic TANISHA (thought at that time to be a combinatino of TANISHA and AoCD). On 2/2018 he underwent an EGD and a colonoscopy with no significant findings to explain anemia.  He was offered a VCE, but he declined stating that he is weak at that time.     Past Medical History:   Diagnosis Date    ANNA (acute kidney injury) 2/22/2018    Anemia     Anxiety     Arthritis     BPH (benign prostatic hyperplasia)     CHF (congestive heart failure) 5/23/2018    Coarse tremors     Coronary artery disease of native artery of native heart with stable angina pectoris 5/21/2018    Diverticulosis     Encounter for blood transfusion     Endocarditis     Hypertension     Urinary  retention        Past Surgical History:   Procedure Laterality Date    APPENDECTOMY      COLONOSCOPY      COLONOSCOPY N/A 2/1/2018    Procedure: COLONOSCOPY;  Surgeon: Richar Payan MD;  Location: [facility] (4TH FLR);  Service: Endoscopy;  Laterality: N/A;  PM prep    CREATION OF AORTOCORONARY ARTERY BYPASS N/A 5/25/2018    Procedure: AORTOCORONARY BYPASS-CABG;  Surgeon: Marvin Go MD;  Location: [facility] OR 2ND FLR;  Service: Cardiovascular;  Laterality: N/A;    EYE SURGERY Right     cataract extraction    FINGER SURGERY      FRACTURE SURGERY Right     index finger    MITRAL VALVE REPLACEMENT N/A 5/25/2018    Procedure: Mitral Valve Replacement;  Surgeon: Marvin Go MD;  [facility];  Service: Cardiovascular;  Laterality: N/A;    TONSILLECTOMY         Review of patient's allergies indicates:   Allergen Reactions    Shellfish containing products     Augmentin [amoxicillin-pot clavulanate]      Patient felt that it raised BP and requested to have it added as intolerance. Tolerated unasyn and ampicillin.    Ciprofloxacin     Flagyl [metronidazole]     Lisinopril Other (See Comments)     cough    Mysoline [primidone]     Omnicef [cefdinir]      Family History     Problem Relation (Age of Onset)    Heart disease Mother, Father    No Known Problems Sister, Brother    Stroke Mother        Social History Main Topics    Smoking status: Never Smoker    Smokeless tobacco: Never Used    Alcohol use No      Comment: none recently    Drug use: No    Sexual activity: Not Currently     Partners: Female     Review of Systems   Constitutional: Negative for activity change, appetite change and fever.   HENT: Negative for trouble swallowing.    Eyes: Negative for visual disturbance.   Respiratory: Negative for cough and shortness of breath.    Cardiovascular: Negative for chest pain.   Gastrointestinal: Negative for abdominal distention, abdominal pain, anal bleeding, blood in stool,  constipation, diarrhea, nausea, rectal pain and vomiting.   Genitourinary: Negative for flank pain.   Musculoskeletal: Negative for arthralgias and back pain.   Skin: Negative for color change.   Allergic/Immunologic: Negative for immunocompromised state.   Psychiatric/Behavioral: Negative for confusion.     Objective:     Vital Signs (Most Recent):  Temp: 97.7 °F (36.5 °C) (07/09/18 1630)  Pulse: 85 (07/09/18 1900)  Resp: 18 (07/09/18 1630)  BP: (!) 101/57 (07/09/18 1630)  SpO2: 97 % (07/09/18 1630) Vital Signs (24h Range):  Temp:  [96.8 °F (36 °C)-97.8 °F (36.6 °C)] 97.7 °F (36.5 °C)  Pulse:  [83-95] 85  Resp:  [18] 18  SpO2:  [93 %-97 %] 97 %  BP: (101-122)/(57-68) 101/57     Weight: 71.3 kg (157 lb 3 oz) (07/01/18 1000)  Body mass index is 22.55 kg/m².      Intake/Output Summary (Last 24 hours) at 07/09/18 1951  Last data filed at 07/09/18 0200   Gross per 24 hour   Intake              500 ml   Output              425 ml   Net               75 ml       Lines/Drains/Airways     Peripherally Inserted Central Catheter Line                 PICC Double Lumen -- days         PICC Double Lumen 03/05/18 1539 right basilic 126 days         PICC Double Lumen 06/04/18 1654 left brachial 35 days         PICC Double Lumen 06/15/18 2200 left brachial 23 days          Central Venous Catheter Line                 Percutaneous Central Line Insertion/Assessment - Quad lumen  05/25/18 0800 left subclavian 45 days          Drain                 Gastrostomy/Enterostomy LUQ -- days         Gastrostomy/Enterostomy 06/07/18 1657 Gastrostomy-jejunostomy LUQ 32 days         Gastrostomy/Enterostomy 06/29/18 2200 Gastrostomy-jejunostomy 9 days          Pressure Ulcer                 Pressure Injury 06/23/18 0710 Right medial Buttocks Stage 2 16 days         Pressure Injury 07/07/18 2000 Gluteal cleft Stage 2 1 day                Physical Exam   Constitutional: He is oriented to person, place, and time. He appears well-developed and  well-nourished.   HENT:   Head: Normocephalic and atraumatic.   Neck: Normal range of motion. Neck supple.   Cardiovascular: Normal rate and regular rhythm.    Pulmonary/Chest: Effort normal and breath sounds normal.   Abdominal: Soft. Bowel sounds are normal. He exhibits no distension and no mass. There is no tenderness. There is no rebound and no guarding. No hernia.   Musculoskeletal: Normal range of motion.   Neurological: He is alert and oriented to person, place, and time.   Skin: Skin is warm and dry.   Psychiatric: He has a normal mood and affect.       Significant Labs:  CBC:   Recent Labs  Lab 07/08/18  0601 07/09/18  0550   WBC 4.74 4.93   HGB 7.3* 7.7*   HCT 23.1* 24.1*   * 127*     BMP:   Recent Labs  Lab 07/09/18  0550   GLU 84      K 4.4      CO2 21*   BUN 25*   CREATININE 1.4   CALCIUM 8.6*   MG 1.8     CMP:   Recent Labs  Lab 07/08/18  0601 07/09/18  0550    84   CALCIUM 8.2* 8.6*   ALBUMIN 2.3*  --    PROT 5.3*  --     139   K 4.5 4.4   CO2 24 21*    110   BUN 29* 25*   CREATININE 1.4 1.4   ALKPHOS 84  --    ALT 16  --    AST 15  --    BILITOT 0.3  --      Coagulation:   Recent Labs  Lab 07/09/18  0550   INR 1.3*       Significant Imaging:  Imaging results within the past 24 hours have been reviewed.    Assessment/Plan:     * Symptomatic anemia    Currently patient denies symtpoms of GI bleeding or symptoms of anemia.  The patient does have history of Iron def anemia, worked up as an outpatient with no clear source. He has been offered a VCE, but declined in the past.  We recommended VCE would be the next step we recommend to identify a source for possible blood loss. He seemed hesitant about proceeding with the VCE, and wanted us to discuss this with his team.  Will contact team and discuss our recommendations.   If the patient is willing to have VCE done as an inpatient, will arrange for that on Wednesday 7/11.  In the meantime, if patient shows signs of  significant GI bleeding, please contact GI.              Thank you for your consult. I will follow-up with patient. Please contact us if you have any additional questions.    Mateo Giles MD  Gastroenterology  [facility]

## 2018-07-10 NOTE — SUBJECTIVE & OBJECTIVE
Interval History: no acute issues     Review of Systems   Constitutional: Negative for chills, fatigue and fever.   Respiratory: Negative for cough and shortness of breath.    Cardiovascular: Negative for chest pain and palpitations.   Gastrointestinal: Negative for abdominal pain.   Genitourinary: Negative for hematuria.     Objective:     Vital:  Reviewed and stable    Physical Exam   Constitutional: He appears well-developed and well-nourished. No distress.   Cardiovascular: Normal rate.    Pulmonary/Chest: Effort normal.   Abdominal: Soft.   Genitourinary:   Genitourinary Comments: Noted yellow urine; no hematuria    Skin:   Healing cabg scars    Vitals reviewed.      Significant Labs: All pertinent labs within the past 24 hours have been reviewed.    Significant Imaging: I have reviewed all pertinent imaging results/findings within the past 24 hours.

## 2018-07-10 NOTE — PLAN OF CARE
Problem: SLP Goal  Goal: SLP Goal  Speech Language Pathology Goals  Goals expected to be met by 7/10/18:    1.  For therapeutic purposes only, pt will tolerate cup sips nectar thickened liquid with independent completion of double swallow per bolus with no overt clinical signs of aspiration. Discontinue/revise goal  2. Pt will ind'ly complete dysphagia exercises x10 each with good ability to strengthen the swallowing musculature. Ongoing goal    Goals expected to be met by 7/17/18:  1.  Ptt will tolerate 1/2 tsp boluses of puree consistencies with independent completion of double swallow per bolus with no overt clinical signs of aspiration.   2. Pt will ind'ly complete dysphagia exercises x10 each with good ability to strengthen the swallowing musculature.               Outcome: Revised  Goals reviewed and revised.  Cont POC. Continued skilled SLP services upon d/c.  Repeat MBSS as an outpatient in 4-6 weeks. IRVIN Patel, CCC-SLP  Speech Language Pathologist  [phone]  7/10/2018

## 2018-07-10 NOTE — PROGRESS NOTES
[facility] Hospital Medicine  Progress Note    Patient Name: Rodolfo Messina  MRN: [id number]  Patient Class: IP- Inpatient   Admission Date: 6/29/2018  Length of Stay: 9 days  Attending Physician: Deejay Osman MD  Primary Care Provider: Deric Claudio MD    [facility] Medicine Team: [facility] HOSP MED G Deejay Osman MD    Subjective:     Principal Problem:Symptomatic anemia    HPI:  No notes on file    Hospital Course:  No notes on file    Interval History: no acute issues     Review of Systems   Constitutional: Negative for chills, fatigue and fever.   Respiratory: Negative for cough and shortness of breath.    Cardiovascular: Negative for chest pain and palpitations.   Gastrointestinal: Negative for abdominal pain.   Genitourinary: Negative for hematuria.     Objective:     Vital:  Reviewed and stable    Physical Exam   Constitutional: He appears well-developed and well-nourished. No distress.   Cardiovascular: Normal rate.    Pulmonary/Chest: Effort normal.   Abdominal: Soft.   Genitourinary:   Genitourinary Comments: Noted yellow urine; no hematuria    Skin:   Healing cabg scars    Vitals reviewed.      Significant Labs: All pertinent labs within the past 24 hours have been reviewed.    Significant Imaging: I have reviewed all pertinent imaging results/findings within the past 24 hours.    Assessment/Plan:      * Symptomatic anemia    - H&H slowly down trending but no reports of bleeding; seems to have improved today  - no reports of bleeding but FOBT positive  - patient decline cystoscopy that was planned for today due to concerns with complication of urinary retention; can follow up outpatient   - appreciate GI recs; can offer a VCE on 7/11 but patient and wife expressed concerns for anesthesia given patient's recent complicated history and would like the ok from Dr. Go before proceeding  - case discussed with Dr. Go today regarding anticoagulation and because the patient has  had several bleeding complications and is now two months out from his surgery, the risks of continuing with warfarin outweighs the benefits at this time; can continue EC ASA 81 mg daily; heparin and warfarin discontinued; patient's wife aware and will update patient  - continue pantoprazole   - no need for transfusion at this time  - hemodynamically stable             H/O atrial flutter    - occurred during hospitalization for CABG and MVR  - no DCCV due to atrial thrombus  - converted with meds; continue amiodarone   - HRs well controlled           CKD (chronic kidney disease) stage 3, GFR 30-59 ml/min    - creatinine improved and stable   - monitor electrolytes with daily labs  - good UOP            Subtherapeutic international normalized ratio (INR)    - see symptomatic anemia         Rash of back              Hematuria    - resolved  - follow up with urology outpatient           Dysphagia    - continue TFs  - to remain NPO per SLP   - nutrition consult to help with bolus TF recs             S/P MVR (mitral valve replacement)    - no warfarin per anemia           S/P CABG x 1    Resumed home ASA at 81 mg  Continue medical therapy           Chronic systolic congestive heart failure    - no acute issues  - continue medical management           Endocarditis with atrial thrombus    - endocarditis management per SBE            Subacute bacterial endocarditis    - remains afebrile and no leukocytosis  - cough predated the antibiotics   - ID recommended resumption of antibiotics; stop date 7/13            VTE Risk Mitigation     None              Deejay Osman MD  Department of Hospital Medicine   [facility]

## 2018-07-10 NOTE — PT/OT/SLP PROGRESS
Speech Language Pathology Treatment    Patient Name:  Rodolfo Messina   MRN:  [id number]  Admitting Diagnosis: Symptomatic anemia    Recommendations:                 General Recommendations:  Dysphagia therapy  Diet recommendations:  NPO, Liquid Diet Level: NPO   Diet recommendations:  NPO, Pleasure Feeding, NPO   For pleasure, pt appears safe for pureed solids via 1/2 tsp x3-5 MAX, 3-5x/ day with consistent implementation of all the following aspiration precautions  Aspiration Precautions: Strict aspiration precautions   · Fully awake and alert for PO intake  · Fully upright position for PO intake  · Small bites: 1/2 tsp size bolus MAX  · Slow rate of eating/ drinking  · 1 bite  @ a time  · Refrain from talking prior to swallow completion   · Remain upright for 20-30 min post PO intake   · Discontinue PO upon: coughing, throat clearing, choking, wet vocal quality, wet breath sounds, watery eyes, reddened facial features  General Precautions: Standard, aspiration, fall, NPO  Communication strategies:  none    Subjective     Pt remains highly motivated to perform dysphagia excs.     Pain/Comfort:  · Pain Rating 1: 0/10    Objective:     Has the patient been evaluated by SLP for swallowing?   Yes  Keep patient NPO? Yes   Current Respiratory Status: room air      Pt seen at bedside with 2 family members present for continued dysphagia therapy and education.  Pt accepted the following PO trials: ice chip x 1 and 1/2 tsp pudding x 7.  Pt performed 2-3 Effortful swallows per bolus, in addition to without PO (total of 25 Effortful swallows produced).  Delayed dry cough present after 5/7 pureed boluses.  Pt also performed Supraglottic swallows x 10, Falsetto x 10, basic TBR excs x 20, Nargis maneuver on 5/7 trials, and Mendelsohan maneuver x 5.  Pt performing exercises with good effort and ability, but laryngeal pumping present after performing at least 3 consecutive dry swallows.  Ongoing education provided to pt and family  regarding purpose of each dysphagia exercises, need for repeat MBSS in a few weeks as an outpatient to determine if safe to resume PO diet, SLP POC before discharge, and importance of performing dysphagia excs outside of ST session. Pt and family expressing good understanding. Pt highly motivated.  White board up to date.     Assessment:     Rodolfo Messina is a 68 y.o. male with an SLP diagnosis of Dysphagia.      Goals:    SLP Goals        Problem: SLP Goal    Goal Priority Disciplines Outcome   SLP Goal     SLP Revised   Description:  Speech Language Pathology Goals  Goals expected to be met by 7/10/18:    1.  For therapeutic purposes only, pt will tolerate cup sips nectar thickened liquid with independent completion of double swallow per bolus with no overt clinical signs of aspiration. Discontinue/revise goal  2. Pt will ind'ly complete dysphagia exercises x10 each with good ability to strengthen the swallowing musculature. Ongoing goal    Goals expected to be met by 7/17/18:  1.  Ptt will tolerate 1/2 tsp boluses of puree consistencies with independent completion of double swallow per bolus with no overt clinical signs of aspiration.   2. Pt will ind'ly complete dysphagia exercises x10 each with good ability to strengthen the swallowing musculature.                                 Plan:     · Patient to be seen:  3 x/week   · Plan of Care expires:  07/31/18  · Plan of Care reviewed with:  patient, family   · SLP Follow-Up:  Yes       Discharge recommendations:  home health speech therapy (outpatient MBSS in 4-6 weeks)     Time Tracking:     SLP Treatment Date:   07/10/18  Speech Start Time:  1208  Speech Stop Time:  1233     Speech Total Time (min):  25 min    Billable Minutes: Treatment Swallowing Dysfunction 15 and Seld Care/Home Management Training 10    IRVIN Patel, JESSICA-SLP  07/10/2018     IRVIN Patel, CCC-SLP  Speech Language Pathologist  [phone]  7/10/2018

## 2018-07-10 NOTE — PROGRESS NOTES
Pharmacist Renal Dose Adjustment Note    Rodolfo Messina is a 68 y.o. male being treated with the medication amipicillin.    Patient Data:    Vital Signs (Most Recent):  Temp: 97.6 °F (36.4 °C) (07/10/18 0710)  Pulse: 89 (07/10/18 0710)  Resp: 18 (07/10/18 0710)  BP: 112/65 (07/10/18 0710)  SpO2: (!) 92 % (07/10/18 0710)   Vital Signs (72h Range):  Temp:  [96.8 °F (36 °C)-98.8 °F (37.1 °C)]   Pulse:  []   Resp:  [16-18]   BP: (101-135)/(56-69)   SpO2:  [92 %-97 %]        Recent Labs     Lab 07/08/18  0601 07/09/18  0550 07/10/18  0447   CREATININE 1.4 1.4 1.5*     Serum creatinine: 1.5 mg/dL (H) 07/10/18 0447  Estimated creatinine clearance: 47.5 mL/min (A)    Medication:ampicillin IV 2g q6h will be changed to ampicillin IV 2g q12h     Pharmacist's Name: Enmanuel Smith

## 2018-07-10 NOTE — HPI
Mr. Messina is a 69 y/o male with past medical history of CAD and MR s/p CABG with MVR on 5/2018, HFrEF, chronic anemia, presented with hematuria after lackey was removed on anti-coagulation.  The patient was admitted ~10 days ago and had management of hematuria. He required transfusion on admission, but remained relatively stable since then. His hematuria improved significantly.   GI was consulted today with concern for slowly declining H/H in the setting of need for anti-coagulation, and an FOBT positive.  Patient denies abdominal pain, N/V, hematemesis, coffee ground emesis, melena, hematochezia, or diarrhea. Denies history of GI bleeding in the past. Denies dizziness, HAQ.  Of note, the patient underwent workup of GI bleeding as an outpatient for chronic TANISHA (thought at that time to be a combinatino of TANISHA and AoCD). On 2/2018 he underwent an EGD and a colonoscopy with no significant findings to explain anemia.  He was offered a VCE, but he declined stating that he is weak at that time.

## 2018-07-10 NOTE — CONSULTS
PharmD Consult received for:  1.) Renally adjust all medications:  · Scr = 1.5, CrCl = 47.5 ml/min  All medications renally-adjusted, dose reduced ampicillin IV to 2g q12h since crcl now less than 50 ml/min.  · Will follow SCr and renally adjust medications as appropriate.  2.) Warfarin dosing (home regimen: 2.5 mg PO M/Thurs/Sat, 5 mg all other days):   · Indication per CC notes: MV replacement/atrial flutter  · Goal INR per CC: 1.5-2.0  Lab Results   Component Value Date    INR 1.5 07/10/2018    INR 1.3 (H) 07/09/2018    INR 1.2 07/08/2018    INR 1.2 07/07/2018     · Per medicine note, patient has had several bleeding complications and the risks of continuing warfarin outweigh the benefits. Warfarin and heparin gtt were discontinued. Plan is to continue ASA 81mg daily.  · Pharmacy will sign off on warfarin consult.    Thank you for the consult,    Enmanuel Smith, PharmD  [id number]      **Note: Consults are reviewed Monday-Friday 7:30-4pm. The above recommendations are only suggested. The recommendations should be considered in conjunction with all patient factors.**

## 2018-07-10 NOTE — PROGRESS NOTES
[facility] Hospital Medicine  Progress Note    Patient Name: Rodolfo Messina  MRN: [id number]  Patient Class: IP- Inpatient   Admission Date: 6/29/2018  Length of Stay: 10 days  Attending Physician: Ramonita Rae MD  Primary Care Provider: Deric Claudio MD    [facility] Medicine Team: [facility]OSP MED  Ramonita Rae MD    Subjective:     Principal Problem:Symptomatic anemia    HPI:  No notes on file    Hospital Course:  No notes on file    Interval History: no acute issues. Patient is amenable to VCE, but would like to talk to Dr Go.      Review of Systems   Constitutional: Negative for chills, fatigue and fever.   Respiratory: Negative for cough and shortness of breath.    Cardiovascular: Negative for chest pain and palpitations.   Gastrointestinal: Negative for abdominal pain.   Genitourinary: Negative for hematuria.     Objective:     Vital:  Reviewed and stable    Physical Exam   Constitutional: He appears well-developed and well-nourished. No distress.   Cardiovascular: Normal rate.    Pulmonary/Chest: Effort normal.   Abdominal: Soft.   Genitourinary:   Genitourinary Comments: Noted yellow urine; no hematuria    Skin:   Healing cabg scars    Vitals reviewed.      Significant Labs: All pertinent labs within the past 24 hours have been reviewed.    Significant Imaging: I have reviewed all pertinent imaging results/findings within the past 24 hours.    Assessment/Plan:      * Symptomatic anemia    - H&H slowly down trending but no reports of bleeding; stable today  - no reports of bleeding but FOBT positive  - patient declined cystoscopy that was planned; can follow up outpatient   - appreciate GI recs; can offer a VCE on 7/11 but patient and wife expressed concerns for anesthesia given patient's recent complicated history and would like the ok from Dr. Go before proceeding. Amenable to VCE as of today.   - case discussed with Dr. Go today regarding anticoagulation and because the  patient has had several bleeding complications and is now two months out from his surgery, the risks of continuing with warfarin outweighs the benefits at this time; can continue EC ASA 81 mg daily; heparin and warfarin discontinued; patient's wife aware and will update patient  - continue pantoprazole   - no need for transfusion at this time  - hemodynamically stable             Hematuria    - resolved  - follow up with urology outpatient           Hyperkalemia    Kayexalate x 1          H/O atrial flutter    - occurred during hospitalization for CABG and MVR  - no DCCV due to atrial thrombus  - converted with meds; continue amiodarone   - HRs well controlled           CKD (chronic kidney disease) stage 3, GFR 30-59 ml/min    - creatinine improved and stable   - monitor electrolytes with daily labs  - good UOP            Subtherapeutic international normalized ratio (INR)    - see symptomatic anemia         Rash of back              Dysphagia    - continue TFs  - to remain NPO per SLP   - nutrition consult to help with bolus TF recs             S/P MVR (mitral valve replacement)    - no warfarin per anemia           S/P CABG x 1    Resumed home ASA at 81 mg  Continue medical therapy           Chronic systolic congestive heart failure    - no acute issues  - continue medical management           Endocarditis with atrial thrombus    - endocarditis management per SBE            Subacute bacterial endocarditis    - remains afebrile and no leukocytosis  - cough predated the antibiotics   - ID recommended resumption of antibiotics; stop date 7/13            VTE Risk Mitigation     None              Ramonita Rae MD  Department of Hospital Medicine   [facility]

## 2018-07-10 NOTE — PLAN OF CARE
Problem: Pressure Ulcer Risk (Sudeep Scale) (Adult,Obstetrics,Pediatric)  Intervention: Promote/Optimize Nutrition  Pt tolerating tube feeding at contous rate of 50cc/hr

## 2018-07-10 NOTE — ASSESSMENT & PLAN NOTE
Currently patient denies symtpoms of GI bleeding or symptoms of anemia.  The patient does have history of Iron def anemia, worked up as an outpatient with no clear source. He has been offered a VCE, but declined in the past.  We recommended VCE would be the next step we recommend to identify a source for possible blood loss. He seemed hesitant about proceeding with the VCE, and wanted us to discuss this with his team.  Will contact team and discuss our recommendations.   If the patient is willing to have VCE done as an inpatient, will arrange for that on Wednesday 7/11.  In the meantime, if patient shows signs of significant GI bleeding, please contact GI.

## 2018-07-10 NOTE — PLAN OF CARE
07/10/18 1550   Discharge Reassessment   Assessment Type Discharge Planning Reassessment   Do you have any problems affording any of your prescribed medications? No   Discharge Plan A Home Health   Discharge Plan B Skilled Nursing Facility   Can the patient answer the patient profile reliably? Yes, cognitively intact   How does the patient rate their overall health at the present time? Good   Describe the patient's ability to walk at the present time. Minor restrictions or changes   How often would a person be available to care for the patient? Whenever needed   Number of comorbid conditions (as recorded on the chart) Three   During the past month, has the patient often been bothered by feeling down, depressed or hopeless? No   During the past month, has the patient often been bothered by little interest or pleasure in doing things? No

## 2018-07-10 NOTE — SUBJECTIVE & OBJECTIVE
Interval History: no acute issues. Patient is amenable to VCE, but would like to talk to Dr Go.      Review of Systems   Constitutional: Negative for chills, fatigue and fever.   Respiratory: Negative for cough and shortness of breath.    Cardiovascular: Negative for chest pain and palpitations.   Gastrointestinal: Negative for abdominal pain.   Genitourinary: Negative for hematuria.     Objective:     Vital:  Reviewed and stable    Physical Exam   Constitutional: He appears well-developed and well-nourished. No distress.   Cardiovascular: Normal rate.    Pulmonary/Chest: Effort normal.   Abdominal: Soft.   Genitourinary:   Genitourinary Comments: Noted yellow urine; no hematuria    Skin:   Healing cabg scars    Vitals reviewed.      Significant Labs: All pertinent labs within the past 24 hours have been reviewed.    Significant Imaging: I have reviewed all pertinent imaging results/findings within the past 24 hours.

## 2018-07-10 NOTE — NURSING
AAOx4, Peg site intact and continuous feeding maintained. No complaints of pain/discomfort. Will continue to monitor.

## 2018-07-10 NOTE — PHYSICIAN QUERY
"PT Name: Rodolfo Messina  MR #: [id number]    Physician Query Form - Consultant Diagnosis Clarification     CDS: Farhana Leblanc RN, CDI     Contact information:[email or web address]  This form is a permanent document in the medical record.     Query Date: July 10, 2018      By submitting this query, we are merely seeking further clarification of documentation.  Please utilize your independent clinical judgment when addressing the question(s) below.      The Medical record contains the following:   Diagnosis Supporting Clinical Information Location in Medical Record   Stage 2 to gluteal cleft       Wound care consult received for pressure injury to buttocks.  Pt presents with Stage 2 to gluteal cleft with redness periwound.  Pt reports "discomfort" to area when laying on back and sitting up in chair. Pt spouse at bedside stating she was using Annie's butt paste at home and is using [employer] paste currently but feels pt wound with more redness periwound.  Discussed proper cleaning of paste from pt, explaining not to rub with force as not all paste may not lift. Discussed options of different ointment and pt and spouse would like to use [employer] clear and eliminate zinc oxide           Wound Care Consult 7/9         Do you agree with the Consultants diagnosis of Stage 2 to Gluteal Cleft?                 [ x  ]   Yes                 [   ]   Yes, but it resolved prior to my assessment of the patient                 [   ]   No                  [   ]   Clinically insignificant                 [   ]   Clinically undetermined                 [   ]   Other/Clarification of findings: ___________________________________________                     "

## 2018-07-10 NOTE — SUBJECTIVE & OBJECTIVE
Past Medical History:   Diagnosis Date    ANNA (acute kidney injury) 2/22/2018    Anemia     Anxiety     Arthritis     BPH (benign prostatic hyperplasia)     CHF (congestive heart failure) 5/23/2018    Coarse tremors     Coronary artery disease of native artery of native heart with stable angina pectoris 5/21/2018    Diverticulosis     Encounter for blood transfusion     Endocarditis     Hypertension     Urinary retention        Past Surgical History:   Procedure Laterality Date    APPENDECTOMY      COLONOSCOPY      COLONOSCOPY N/A 2/1/2018    Procedure: COLONOSCOPY;  Surgeon: Richar Payan MD;  Location: [facility] (4TH FLR);  Service: Endoscopy;  Laterality: N/A;  PM prep    CREATION OF AORTOCORONARY ARTERY BYPASS N/A 5/25/2018    Procedure: AORTOCORONARY BYPASS-CABG;  Surgeon: Marvin Go MD;  Location: [facility] OR 2ND FLR;  Service: Cardiovascular;  Laterality: N/A;    EYE SURGERY Right     cataract extraction    FINGER SURGERY      FRACTURE SURGERY Right     index finger    MITRAL VALVE REPLACEMENT N/A 5/25/2018    Procedure: Mitral Valve Replacement;  Surgeon: Marvin Go MD;  Location: [facility] OR [address] [place] [address];  Service: Cardiovascular;  Laterality: N/A;    TONSILLECTOMY         Review of patient's allergies indicates:   Allergen Reactions    Shellfish containing products     Augmentin [amoxicillin-pot clavulanate]      Patient felt that it raised BP and requested to have it added as intolerance. Tolerated unasyn and ampicillin.    Ciprofloxacin     Flagyl [metronidazole]     Lisinopril Other (See Comments)     cough    Mysoline [primidone]     Omnicef [cefdinir]      Family History     Problem Relation (Age of Onset)    Heart disease Mother, Father    No Known Problems Sister, Brother    Stroke Mother        Social History Main Topics    Smoking status: Never Smoker    Smokeless tobacco: Never Used    Alcohol use No      Comment: none recently    Drug use: No    Sexual activity:  Not Currently     Partners: Female     Review of Systems   Constitutional: Negative for activity change, appetite change and fever.   HENT: Negative for trouble swallowing.    Eyes: Negative for visual disturbance.   Respiratory: Negative for cough and shortness of breath.    Cardiovascular: Negative for chest pain.   Gastrointestinal: Negative for abdominal distention, abdominal pain, anal bleeding, blood in stool, constipation, diarrhea, nausea, rectal pain and vomiting.   Genitourinary: Negative for flank pain.   Musculoskeletal: Negative for arthralgias and back pain.   Skin: Negative for color change.   Allergic/Immunologic: Negative for immunocompromised state.   Psychiatric/Behavioral: Negative for confusion.     Objective:     Vital Signs (Most Recent):  Temp: 97.7 °F (36.5 °C) (07/09/18 1630)  Pulse: 85 (07/09/18 1900)  Resp: 18 (07/09/18 1630)  BP: (!) 101/57 (07/09/18 1630)  SpO2: 97 % (07/09/18 1630) Vital Signs (24h Range):  Temp:  [96.8 °F (36 °C)-97.8 °F (36.6 °C)] 97.7 °F (36.5 °C)  Pulse:  [83-95] 85  Resp:  [18] 18  SpO2:  [93 %-97 %] 97 %  BP: (101-122)/(57-68) 101/57     Weight: 71.3 kg (157 lb 3 oz) (07/01/18 1000)  Body mass index is 22.55 kg/m².      Intake/Output Summary (Last 24 hours) at 07/09/18 1951  Last data filed at 07/09/18 0200   Gross per 24 hour   Intake              500 ml   Output              425 ml   Net               75 ml       Lines/Drains/Airways     Peripherally Inserted Central Catheter Line                 PICC Double Lumen -- days         PICC Double Lumen 03/05/18 1539 right basilic 126 days         PICC Double Lumen 06/04/18 1654 left brachial 35 days         PICC Double Lumen 06/15/18 2200 left brachial 23 days          Central Venous Catheter Line                 Percutaneous Central Line Insertion/Assessment - Quad lumen  05/25/18 0800 left subclavian 45 days          Drain                 Gastrostomy/Enterostomy LUQ -- days         Gastrostomy/Enterostomy 06/07/18  1657 Gastrostomy-jejunostomy LUQ 32 days         Gastrostomy/Enterostomy 06/29/18 2200 Gastrostomy-jejunostomy 9 days          Pressure Ulcer                 Pressure Injury 06/23/18 0710 Right medial Buttocks Stage 2 16 days         Pressure Injury 07/07/18 2000 Gluteal cleft Stage 2 1 day                Physical Exam   Constitutional: He is oriented to person, place, and time. He appears well-developed and well-nourished.   HENT:   Head: Normocephalic and atraumatic.   Neck: Normal range of motion. Neck supple.   Cardiovascular: Normal rate and regular rhythm.    Pulmonary/Chest: Effort normal and breath sounds normal.   Abdominal: Soft. Bowel sounds are normal. He exhibits no distension and no mass. There is no tenderness. There is no rebound and no guarding. No hernia.   Musculoskeletal: Normal range of motion.   Neurological: He is alert and oriented to person, place, and time.   Skin: Skin is warm and dry.   Psychiatric: He has a normal mood and affect.       Significant Labs:  CBC:   Recent Labs  Lab 07/08/18  0601 07/09/18  0550   WBC 4.74 4.93   HGB 7.3* 7.7*   HCT 23.1* 24.1*   * 127*     BMP:   Recent Labs  Lab 07/09/18  0550   GLU 84      K 4.4      CO2 21*   BUN 25*   CREATININE 1.4   CALCIUM 8.6*   MG 1.8     CMP:   Recent Labs  Lab 07/08/18  0601 07/09/18  0550    84   CALCIUM 8.2* 8.6*   ALBUMIN 2.3*  --    PROT 5.3*  --     139   K 4.5 4.4   CO2 24 21*    110   BUN 29* 25*   CREATININE 1.4 1.4   ALKPHOS 84  --    ALT 16  --    AST 15  --    BILITOT 0.3  --      Coagulation:   Recent Labs  Lab 07/09/18  0550   INR 1.3*       Significant Imaging:  Imaging results within the past 24 hours have been reviewed.

## 2018-07-10 NOTE — ASSESSMENT & PLAN NOTE
- H&H slowly down trending but no reports of bleeding; stable today  - no reports of bleeding but FOBT positive  - patient declined cystoscopy that was planned; can follow up outpatient   - appreciate GI recs; can offer a VCE on 7/11 but patient and wife expressed concerns for anesthesia given patient's recent complicated history and would like the ok from Dr. Go before proceeding. Amenable to VCE as of today.   - case discussed with Dr. Go today regarding anticoagulation and because the patient has had several bleeding complications and is now two months out from his surgery, the risks of continuing with warfarin outweighs the benefits at this time; can continue EC ASA 81 mg daily; heparin and warfarin discontinued; patient's wife aware and will update patient  - continue pantoprazole   - no need for transfusion at this time  - hemodynamically stable

## 2018-07-11 ENCOUNTER — TELEPHONE (OUTPATIENT)
Dept: GASTROENTEROLOGY | Facility: CLINIC | Age: 68
End: 2018-07-11

## 2018-07-11 LAB
ANION GAP SERPL CALC-SCNC: 8 MMOL/L
BASOPHILS # BLD AUTO: 0.03 K/UL
BASOPHILS NFR BLD: 0.6 %
BUN SERPL-MCNC: 24 MG/DL
CALCIUM SERPL-MCNC: 9 MG/DL
CHLORIDE SERPL-SCNC: 109 MMOL/L
CO2 SERPL-SCNC: 24 MMOL/L
CREAT SERPL-MCNC: 1.4 MG/DL
DIFFERENTIAL METHOD: ABNORMAL
EOSINOPHIL # BLD AUTO: 0.5 K/UL
EOSINOPHIL NFR BLD: 9.3 %
ERYTHROCYTE [DISTWIDTH] IN BLOOD BY AUTOMATED COUNT: 16.5 %
EST. GFR  (AFRICAN AMERICAN): 59.3 ML/MIN/1.73 M^2
EST. GFR  (NON AFRICAN AMERICAN): 51.3 ML/MIN/1.73 M^2
GLUCOSE SERPL-MCNC: 86 MG/DL
HCT VFR BLD AUTO: 24.5 %
HGB BLD-MCNC: 7.7 G/DL
IMM GRANULOCYTES # BLD AUTO: 0.01 K/UL
IMM GRANULOCYTES NFR BLD AUTO: 0.2 %
INR PPP: 1.3
LYMPHOCYTES # BLD AUTO: 0.8 K/UL
LYMPHOCYTES NFR BLD: 16.1 %
MAGNESIUM SERPL-MCNC: 1.9 MG/DL
MCH RBC QN AUTO: 30.6 PG
MCHC RBC AUTO-ENTMCNC: 31.4 G/DL
MCV RBC AUTO: 97 FL
MONOCYTES # BLD AUTO: 0.4 K/UL
MONOCYTES NFR BLD: 8.2 %
NEUTROPHILS # BLD AUTO: 3.2 K/UL
NEUTROPHILS NFR BLD: 65.6 %
NRBC BLD-RTO: 0 /100 WBC
PHOSPHATE SERPL-MCNC: 2.8 MG/DL
PLATELET # BLD AUTO: 150 K/UL
PMV BLD AUTO: 12.4 FL
POCT GLUCOSE: 112 MG/DL (ref 70–110)
POTASSIUM SERPL-SCNC: 4.6 MMOL/L
PROTHROMBIN TIME: 13.3 SEC
RBC # BLD AUTO: 2.52 M/UL
SODIUM SERPL-SCNC: 141 MMOL/L
WBC # BLD AUTO: 4.85 K/UL

## 2018-07-11 PROCEDURE — 80048 BASIC METABOLIC PNL TOTAL CA: CPT

## 2018-07-11 PROCEDURE — 87040 BLOOD CULTURE FOR BACTERIA: CPT

## 2018-07-11 PROCEDURE — 11000001 HC ACUTE MED/SURG PRIVATE ROOM

## 2018-07-11 PROCEDURE — 25000003 PHARM REV CODE 250: Performed by: HOSPITALIST

## 2018-07-11 PROCEDURE — 84100 ASSAY OF PHOSPHORUS: CPT

## 2018-07-11 PROCEDURE — 63600175 PHARM REV CODE 636 W HCPCS: Performed by: HOSPITALIST

## 2018-07-11 PROCEDURE — 91110 GI TRC IMG INTRAL ESOPH-ILE: CPT | Mod: 26,,, | Performed by: INTERNAL MEDICINE

## 2018-07-11 PROCEDURE — 99233 SBSQ HOSP IP/OBS HIGH 50: CPT | Mod: ,,, | Performed by: HOSPITALIST

## 2018-07-11 PROCEDURE — 83735 ASSAY OF MAGNESIUM: CPT

## 2018-07-11 PROCEDURE — 85025 COMPLETE CBC W/AUTO DIFF WBC: CPT

## 2018-07-11 PROCEDURE — 25000003 PHARM REV CODE 250: Performed by: INTERNAL MEDICINE

## 2018-07-11 PROCEDURE — 85610 PROTHROMBIN TIME: CPT

## 2018-07-11 RX ADMIN — AMPICILLIN SODIUM 2 G: 2 INJECTION, POWDER, FOR SOLUTION INTRAVENOUS at 05:07

## 2018-07-11 RX ADMIN — CETIRIZINE HYDROCHLORIDE 5 MG: 5 TABLET, FILM COATED ORAL at 06:07

## 2018-07-11 RX ADMIN — CEFTRIAXONE SODIUM 2 G: 2 INJECTION, POWDER, FOR SOLUTION INTRAMUSCULAR; INTRAVENOUS at 08:07

## 2018-07-11 RX ADMIN — AMLODIPINE BESYLATE 10 MG: 10 TABLET ORAL at 06:07

## 2018-07-11 RX ADMIN — FUROSEMIDE 40 MG: 40 TABLET ORAL at 06:07

## 2018-07-11 RX ADMIN — METOPROLOL SUCCINATE 12.5 MG: 25 TABLET, EXTENDED RELEASE ORAL at 06:07

## 2018-07-11 RX ADMIN — ATORVASTATIN CALCIUM 40 MG: 20 TABLET, FILM COATED ORAL at 06:07

## 2018-07-11 RX ADMIN — ASPIRIN 81 MG: 81 TABLET, COATED ORAL at 06:07

## 2018-07-11 RX ADMIN — AMPICILLIN SODIUM 2 G: 2 INJECTION, POWDER, FOR SOLUTION INTRAVENOUS at 06:07

## 2018-07-11 RX ADMIN — TAMSULOSIN HYDROCHLORIDE 0.4 MG: 0.4 CAPSULE ORAL at 06:07

## 2018-07-11 RX ADMIN — AMIODARONE HYDROCHLORIDE 200 MG: 200 TABLET ORAL at 06:07

## 2018-07-11 RX ADMIN — POTASSIUM CHLORIDE 20 MEQ: 20 SOLUTION ORAL at 06:07

## 2018-07-11 RX ADMIN — MIRTAZAPINE 30 MG: 15 TABLET, FILM COATED ORAL at 10:07

## 2018-07-11 RX ADMIN — AMPICILLIN SODIUM 2 G: 2 INJECTION, POWDER, FOR SOLUTION INTRAVENOUS at 01:07

## 2018-07-11 NOTE — TREATMENT PLAN
Video capsule endoscopy discussed with team and patient. He agrees to proceed with the procedure.  Consent obtained.  H/H and vital signs stable. Patient feeling well.    Will proceed with capsule endoscopy tomorrow. Golytely ordered, and to keep patient NPO after midnight.

## 2018-07-11 NOTE — PLAN OF CARE
Problem: Pressure Ulcer (Adult)  Intervention: Promote/Optimize Nutrition  Pt up walking around halls and in roomm with wife

## 2018-07-11 NOTE — PROGRESS NOTES
[facility] Hospital Medicine  Progress Note    Patient Name: Rodolfo Messina  MRN: [id number]  Patient Class: IP- Inpatient   Admission Date: 6/29/2018  Length of Stay: 11 days  Attending Physician: Ramonita Rae MD  Primary Care Provider: Deric Claudio MD    [facility] Medicine Team: [facility] HOSP MED  Ramonita Rae MD    Subjective:     Principal Problem:Symptomatic anemia    HPI:  No notes on file    Hospital Course:  No notes on file    Interval History: no acute issues. VCE dropped this AM. Hb stable.     Review of Systems   Constitutional: Negative for chills, fatigue and fever.   Respiratory: Negative for cough and shortness of breath.    Cardiovascular: Negative for chest pain and palpitations.   Gastrointestinal: Negative for abdominal pain.   Genitourinary: Negative for hematuria.     Objective:     Vital:  Reviewed and stable    Physical Exam   Constitutional: He appears well-developed and well-nourished. No distress.   Cardiovascular: Normal rate.    Pulmonary/Chest: Effort normal.   Abdominal: Soft.   Genitourinary:   Genitourinary Comments: Noted yellow urine; no hematuria    Skin:   Healing cabg scars    Vitals reviewed.      Significant Labs: All pertinent labs within the past 24 hours have been reviewed.    Significant Imaging: I have reviewed all pertinent imaging results/findings within the past 24 hours.    Assessment/Plan:      * Symptomatic anemia    - H&H slowly down trending but no reports of bleeding; stable today  - no reports of bleeding but FOBT positive  - patient declined cystoscopy that was planned; can follow up outpatient   - case discussed with Dr. Go today regarding anticoagulation and because the patient has had several bleeding complications and is now two months out from his surgery, the risks of continuing with warfarin outweighs the benefits at this time; can continue EC ASA 81 mg daily; heparin and warfarin discontinued; patient's wife aware and will  update patient  - continue pantoprazole   - no need for transfusion at this time  - hemodynamically stable   - VCE today            Hematuria    - resolved  - follow up with urology outpatient           Hyperkalemia    Kayexalate x 1          H/O atrial flutter    - occurred during hospitalization for CABG and MVR  - no DCCV due to atrial thrombus  - converted with meds; continue amiodarone   - HRs well controlled           CKD (chronic kidney disease) stage 3, GFR 30-59 ml/min    - creatinine improved and stable   - monitor electrolytes with daily labs  - good UOP            Subtherapeutic international normalized ratio (INR)    - see symptomatic anemia         Rash of back              Dysphagia    - continue TFs  - to remain NPO per SLP   - nutrition consult to help with bolus TF recs             S/P MVR (mitral valve replacement)    - no warfarin per anemia           S/P CABG x 1    Resumed home ASA at 81 mg  Continue medical therapy           Chronic systolic congestive heart failure    - no acute issues  - continue medical management           Endocarditis with atrial thrombus    - endocarditis management per SBE            Subacute bacterial endocarditis    - remains afebrile and no leukocytosis  - cough predated the antibiotics   - ID recommended resumption of antibiotics; stop date 7/13            VTE Risk Mitigation     None              Ramonita Rae MD  Department of Hospital Medicine   [facility]

## 2018-07-11 NOTE — ASSESSMENT & PLAN NOTE
- H&H slowly down trending but no reports of bleeding; stable today  - no reports of bleeding but FOBT positive  - patient declined cystoscopy that was planned; can follow up outpatient   - case discussed with Dr. Go today regarding anticoagulation and because the patient has had several bleeding complications and is now two months out from his surgery, the risks of continuing with warfarin outweighs the benefits at this time; can continue EC ASA 81 mg daily; heparin and warfarin discontinued; patient's wife aware and will update patient  - continue pantoprazole   - no need for transfusion at this time  - hemodynamically stable   - VCE today

## 2018-07-11 NOTE — SUBJECTIVE & OBJECTIVE
Interval History: no acute issues. VCE dropped this AM. Hb stable.     Review of Systems   Constitutional: Negative for chills, fatigue and fever.   Respiratory: Negative for cough and shortness of breath.    Cardiovascular: Negative for chest pain and palpitations.   Gastrointestinal: Negative for abdominal pain.   Genitourinary: Negative for hematuria.     Objective:     Vital:  Reviewed and stable    Physical Exam   Constitutional: He appears well-developed and well-nourished. No distress.   Cardiovascular: Normal rate.    Pulmonary/Chest: Effort normal.   Abdominal: Soft.   Genitourinary:   Genitourinary Comments: Noted yellow urine; no hematuria    Skin:   Healing cabg scars    Vitals reviewed.      Significant Labs: All pertinent labs within the past 24 hours have been reviewed.    Significant Imaging: I have reviewed all pertinent imaging results/findings within the past 24 hours.

## 2018-07-12 LAB
ANION GAP SERPL CALC-SCNC: 6 MMOL/L
BASOPHILS # BLD AUTO: 0.01 K/UL
BASOPHILS NFR BLD: 0.2 %
BUN SERPL-MCNC: 22 MG/DL
CALCIUM SERPL-MCNC: 8.6 MG/DL
CHLORIDE SERPL-SCNC: 111 MMOL/L
CO2 SERPL-SCNC: 24 MMOL/L
CREAT SERPL-MCNC: 1.4 MG/DL
DIFFERENTIAL METHOD: ABNORMAL
EOSINOPHIL # BLD AUTO: 0.5 K/UL
EOSINOPHIL NFR BLD: 8.1 %
ERYTHROCYTE [DISTWIDTH] IN BLOOD BY AUTOMATED COUNT: 16.7 %
EST. GFR  (AFRICAN AMERICAN): 59.3 ML/MIN/1.73 M^2
EST. GFR  (NON AFRICAN AMERICAN): 51.3 ML/MIN/1.73 M^2
GLUCOSE SERPL-MCNC: 108 MG/DL
HCT VFR BLD AUTO: 23.8 %
HGB BLD-MCNC: 7.5 G/DL
IMM GRANULOCYTES # BLD AUTO: 0.02 K/UL
IMM GRANULOCYTES NFR BLD AUTO: 0.3 %
LYMPHOCYTES # BLD AUTO: 1 K/UL
LYMPHOCYTES NFR BLD: 15.4 %
MAGNESIUM SERPL-MCNC: 1.8 MG/DL
MCH RBC QN AUTO: 30.7 PG
MCHC RBC AUTO-ENTMCNC: 31.5 G/DL
MCV RBC AUTO: 98 FL
MONOCYTES # BLD AUTO: 0.6 K/UL
MONOCYTES NFR BLD: 9.4 %
NEUTROPHILS # BLD AUTO: 4.1 K/UL
NEUTROPHILS NFR BLD: 66.6 %
NRBC BLD-RTO: 0 /100 WBC
PHOSPHATE SERPL-MCNC: 2.9 MG/DL
PLATELET # BLD AUTO: 134 K/UL
PMV BLD AUTO: 11.7 FL
POCT GLUCOSE: 116 MG/DL (ref 70–110)
POCT GLUCOSE: 129 MG/DL (ref 70–110)
POTASSIUM SERPL-SCNC: 5.1 MMOL/L
RBC # BLD AUTO: 2.44 M/UL
SODIUM SERPL-SCNC: 141 MMOL/L
WBC # BLD AUTO: 6.18 K/UL

## 2018-07-12 PROCEDURE — 63600175 PHARM REV CODE 636 W HCPCS: Performed by: HOSPITALIST

## 2018-07-12 PROCEDURE — 25000003 PHARM REV CODE 250: Performed by: HOSPITALIST

## 2018-07-12 PROCEDURE — 85025 COMPLETE CBC W/AUTO DIFF WBC: CPT

## 2018-07-12 PROCEDURE — 80048 BASIC METABOLIC PNL TOTAL CA: CPT

## 2018-07-12 PROCEDURE — 99232 SBSQ HOSP IP/OBS MODERATE 35: CPT | Mod: ,,, | Performed by: HOSPITALIST

## 2018-07-12 PROCEDURE — 11000001 HC ACUTE MED/SURG PRIVATE ROOM

## 2018-07-12 PROCEDURE — 84100 ASSAY OF PHOSPHORUS: CPT

## 2018-07-12 PROCEDURE — 97803 MED NUTRITION INDIV SUBSEQ: CPT

## 2018-07-12 PROCEDURE — 25000003 PHARM REV CODE 250: Performed by: INTERNAL MEDICINE

## 2018-07-12 PROCEDURE — 36415 COLL VENOUS BLD VENIPUNCTURE: CPT

## 2018-07-12 PROCEDURE — 83735 ASSAY OF MAGNESIUM: CPT

## 2018-07-12 RX ADMIN — POTASSIUM CHLORIDE 20 MEQ: 20 SOLUTION ORAL at 08:07

## 2018-07-12 RX ADMIN — ATORVASTATIN CALCIUM 40 MG: 20 TABLET, FILM COATED ORAL at 09:07

## 2018-07-12 RX ADMIN — AMLODIPINE BESYLATE 10 MG: 10 TABLET ORAL at 09:07

## 2018-07-12 RX ADMIN — AMPICILLIN SODIUM 2 G: 2 INJECTION, POWDER, FOR SOLUTION INTRAVENOUS at 05:07

## 2018-07-12 RX ADMIN — ASPIRIN 81 MG: 81 TABLET, COATED ORAL at 09:07

## 2018-07-12 RX ADMIN — CEFTRIAXONE SODIUM 2 G: 2 INJECTION, POWDER, FOR SOLUTION INTRAMUSCULAR; INTRAVENOUS at 08:07

## 2018-07-12 RX ADMIN — AMIODARONE HYDROCHLORIDE 200 MG: 200 TABLET ORAL at 09:07

## 2018-07-12 RX ADMIN — CEFTRIAXONE SODIUM 2 G: 2 INJECTION, POWDER, FOR SOLUTION INTRAMUSCULAR; INTRAVENOUS at 07:07

## 2018-07-12 RX ADMIN — PANTOPRAZOLE SODIUM 40 MG: 40 GRANULE, DELAYED RELEASE ORAL at 09:07

## 2018-07-12 RX ADMIN — AMPICILLIN SODIUM 2 G: 2 INJECTION, POWDER, FOR SOLUTION INTRAVENOUS at 06:07

## 2018-07-12 RX ADMIN — TAMSULOSIN HYDROCHLORIDE 0.4 MG: 0.4 CAPSULE ORAL at 09:07

## 2018-07-12 RX ADMIN — POTASSIUM CHLORIDE 20 MEQ: 20 SOLUTION ORAL at 09:07

## 2018-07-12 RX ADMIN — FUROSEMIDE 40 MG: 40 TABLET ORAL at 09:07

## 2018-07-12 RX ADMIN — MIRTAZAPINE 30 MG: 15 TABLET, FILM COATED ORAL at 08:07

## 2018-07-12 RX ADMIN — METOPROLOL SUCCINATE 12.5 MG: 25 TABLET, EXTENDED RELEASE ORAL at 09:07

## 2018-07-12 RX ADMIN — CETIRIZINE HYDROCHLORIDE 5 MG: 5 TABLET, FILM COATED ORAL at 09:07

## 2018-07-12 NOTE — PLAN OF CARE
Problem: Patient Care Overview  Goal: Plan of Care Review  Outcome: Ongoing (interventions implemented as appropriate)   Pt AA0x3 and VSS.  Two side rails up, bed locked, call light within reach. No falls noted as these precautions remain. Pain controlled well with PRN meds. Hourly rounds made and no complaints at this time noted. Will resume with plan of care.

## 2018-07-12 NOTE — PROGRESS NOTES
[facility] Hospital Medicine  Progress Note    Patient Name: Rodolfo Messina  MRN: [id number]  Patient Class: IP- Inpatient   Admission Date: 6/29/2018  Length of Stay: 12 days  Attending Physician: Ramonita Rae MD  Primary Care Provider: Deric Claudio MD    [facility] Medicine Team: [facility]OSP MED  Ramonita Rae MD    Subjective:     Principal Problem:Symptomatic anemia    HPI:  No notes on file    Hospital Course:  No notes on file    Interval History: No acute issues. Feels well. No findings on VCE yesterday. Wants bolus tube feedings instead of continuous.       Review of Systems   Constitutional: Negative for chills, fatigue and fever.   Respiratory: Negative for cough and shortness of breath.    Cardiovascular: Negative for chest pain and palpitations.   Gastrointestinal: Negative for abdominal pain.   Genitourinary: Negative for hematuria.     Objective:     Vital:  Reviewed and stable    Physical Exam   Constitutional: He appears well-developed and well-nourished. No distress.   Cardiovascular: Normal rate.    Pulmonary/Chest: Effort normal.   Abdominal: Soft.   Genitourinary:   Genitourinary Comments: Noted yellow urine; no hematuria    Skin:   Healing cabg scars    Vitals reviewed.      Significant Labs: All pertinent labs within the past 24 hours have been reviewed.    Significant Imaging: I have reviewed all pertinent imaging results/findings within the past 24 hours.    Assessment/Plan:      * Symptomatic anemia    - H&H stable today  - no reports of bleeding but FOBT positive  - patient declined cystoscopy that was planned; can follow up outpatient   - case discussed with Dr. Go regarding anticoagulation and because the patient has had several bleeding complications and is now two months out from his surgery, the risks of continuing with warfarin outweighs the benefits at this time; can continue EC ASA 81 mg daily; heparin and warfarin discontinued; patient's wife aware and  will update patient  - continue pantoprazole   - hemodynamically stable   - VCE without evidence of bleeding            Hematuria    - resolved  - follow up with urology outpatient           Hyperkalemia    Kayexalate x 1          H/O atrial flutter    - occurred during hospitalization for CABG and MVR  - no DCCV due to atrial thrombus  - converted with meds; continue amiodarone   - HRs well controlled           CKD (chronic kidney disease) stage 3, GFR 30-59 ml/min    - creatinine improved and stable   - monitor electrolytes with daily labs  - good UOP            Subtherapeutic international normalized ratio (INR)    - see symptomatic anemia         Rash of back              Dysphagia    - continue TFs  - to remain NPO per SLP   - nutrition consult to help with bolus TF recs             S/P MVR (mitral valve replacement)    - no warfarin per anemia           S/P CABG x 1    Resumed home ASA at 81 mg  Continue medical therapy           Chronic systolic congestive heart failure    - no acute issues  - continue medical management           Endocarditis with atrial thrombus    - endocarditis management per SBE            Subacute bacterial endocarditis    - remains afebrile and no leukocytosis  - cough predated the antibiotics   - ID recommended resumption of antibiotics; stop date 7/13            VTE Risk Mitigation     None              Ramonita Rae MD  Department of Hospital Medicine   [facility]

## 2018-07-12 NOTE — SUBJECTIVE & OBJECTIVE
Interval History: No acute issues. Feels well. No findings on VCE yesterday. Wants bolus tube feedings instead of continuous.       Review of Systems   Constitutional: Negative for chills, fatigue and fever.   Respiratory: Negative for cough and shortness of breath.    Cardiovascular: Negative for chest pain and palpitations.   Gastrointestinal: Negative for abdominal pain.   Genitourinary: Negative for hematuria.     Objective:     Vital:  Reviewed and stable    Physical Exam   Constitutional: He appears well-developed and well-nourished. No distress.   Cardiovascular: Normal rate.    Pulmonary/Chest: Effort normal.   Abdominal: Soft.   Genitourinary:   Genitourinary Comments: Noted yellow urine; no hematuria    Skin:   Healing cabg scars    Vitals reviewed.      Significant Labs: All pertinent labs within the past 24 hours have been reviewed.    Significant Imaging: I have reviewed all pertinent imaging results/findings within the past 24 hours.

## 2018-07-12 NOTE — ASSESSMENT & PLAN NOTE
- H&H stable today  - no reports of bleeding but FOBT positive  - patient declined cystoscopy that was planned; can follow up outpatient   - case discussed with Dr. Go regarding anticoagulation and because the patient has had several bleeding complications and is now two months out from his surgery, the risks of continuing with warfarin outweighs the benefits at this time; can continue EC ASA 81 mg daily; heparin and warfarin discontinued; patient's wife aware and will update patient  - continue pantoprazole   - hemodynamically stable   - VCE without evidence of bleeding

## 2018-07-12 NOTE — PROGRESS NOTES
"  [facility]  Adult Nutrition  Consult Note    SUMMARY     Recommendations    1. Continue current TF regimen of Isosource 1.5 @ 50 mL/hr. Meets 97% EEN, 100% EPN.    - Hold for residuals >500.   2. If bolus TFs desired, recommend Isosource 1.5 - 5 cans/day to provide 1875 calories, 85 grams protein, and 955 mL fluid.   3. RD to monitor & follow-up.    Goals: Meet % EEN, EPN  Nutrition Goal Status: goal met  Communication of RD Recs: reviewed with RN    Reason for Assessment    Reason for Assessment: RD follow-up  Diagnosis: other (see comments) (Anemia)  Relevant Medical History: HTN, CHF, PEG placement  Interdisciplinary Rounds: attended    General Information Comments: Pt continues to tolerate TFs at goal rate via PEG. Remains NPO, per ST recommendations.  Nutrition Discharge Planning: Tolerance of TF    Nutrition/Diet History    Patient Reported Diet/Restrictions/Preferences: no oral intake, other (see comments) (Isosource 1.5 @ 50 mL/hr x 24 hours/day.)  Do you have any cultural, spiritual, Muslim conflicts, given your current situation?: none  Factors Affecting Nutritional Intake: NPO    Anthropometrics    Temp: 98.2 °F (36.8 °C)  Height Method: Stated  Height: 5' 10" (177.8 cm)  Height (inches): 70 in  Weight Method: Bed Scale  Weight: 71.3 kg (157 lb 3 oz)  Weight (lb): 157.19 lb  Ideal Body Weight (IBW), Male: 166 lb  % Ideal Body Weight, Male (lb): 94.69 lb  BMI (Calculated): 22.6  BMI Grade: 18.5-24.9 - normal  Usual Body Weight (UBW), k kg  % Usual Body Weight: 108.26  % Weight Change From Usual Weight: 8.03 %    Lab/Procedures/Meds    Pertinent Labs Reviewed: reviewed  Pertinent Labs Comments: GFR 51.3  Pertinent Medications Reviewed: reviewed  Pertinent Medications Comments: Statin    Physical Findings/Assessment    Overall Physical Appearance: underweight, loss of muscle mass  Tubes: gastrostomy tube  Oral/Mouth Cavity: WDL  Skin: intact    Estimated/Assessed " Needs    Weight Used For Calorie Calculations: 71.3 kg (157 lb 3 oz)     Energy Calorie Requirements (kcal): 1861 kcal/d  Energy Need Method: [place]-St Jeor (1.25 PAL)     Protein Requirements: 71-86 g/d (1-1.2 g/kg)  Weight Used For Protein Calculations: 71.3 kg (157 lb 3 oz)     Fluid Need Method: other (see comments) (Per MD or 1 mL/kcal)    Nutrition Prescription Ordered    Current Diet Order: NPO  Current Nutrition Support Formula Ordered: Isosource 1.5  Current Nutrition Support Rate Ordered: 50 mL/hr    Evaluation of Received Nutrient/Fluid Intake    Enteral Calories (kcal): 1800  Enteral Protein (gm): 82  Enteral (Free Water) Fluid (mL): 917    Free Water Flush Fluid (mL): 3000    % Kcal Needs: 97%  % Protein Needs: 100%    Energy Calories Required: meeting needs  Protein Required: meeting needs  Fluid Required: other (see comments) (Per MD or 1 mL/kcal)    Comments: LBM recorded: 7/1    Tolerance: tolerating    Nutrition Risk    Level of Risk/Frequency of Follow-up: low (f/u 1 x wk)     Assessment and Plan    Nutrition Problem  Dysphagia      Related to (etiology):   NPO status     Signs and Symptoms (as evidenced by):   Primary nutrition via PEG     Nutrition Diagnosis Status:   Continues      Monitor and Evaluation    Food and Nutrient Intake: enteral nutrition intake  Food and Nutrient Adminstration: enteral and parenteral nutrition administration  Physical Activity and Function: nutrition-related ADLs and IADLs  Anthropometric Measurements: weight, weight change  Biochemical Data, Medical Tests and Procedures: lipid profile, inflammatory profile, electrolyte and renal panel, gastrointestinal profile, glucose/endocrine profile  Nutrition-Focused Physical Findings: overall appearance     Nutrition Follow-Up    RD Follow-up?: Yes

## 2018-07-12 NOTE — PROGRESS NOTES
Upon assessment found that J-tube port was unable to be flushed. Attempted multiple times along with two other nurses using multiple different techniques. G-tube port is patent and tube feedings are able to continue. Marcos Hopson notified. Will continue to monitor pt.

## 2018-07-13 VITALS
BODY MASS INDEX: 22.5 KG/M2 | DIASTOLIC BLOOD PRESSURE: 55 MMHG | OXYGEN SATURATION: 96 % | SYSTOLIC BLOOD PRESSURE: 119 MMHG | WEIGHT: 157.19 LBS | RESPIRATION RATE: 16 BRPM | HEART RATE: 78 BPM | TEMPERATURE: 98 F | HEIGHT: 70 IN

## 2018-07-13 DIAGNOSIS — D64.9 ANEMIA, UNSPECIFIED TYPE: Primary | ICD-10-CM

## 2018-07-13 LAB
ANION GAP SERPL CALC-SCNC: 6 MMOL/L
BASOPHILS # BLD AUTO: 0.03 K/UL
BASOPHILS NFR BLD: 0.6 %
BUN SERPL-MCNC: 21 MG/DL
CALCIUM SERPL-MCNC: 8.6 MG/DL
CHLORIDE SERPL-SCNC: 111 MMOL/L
CO2 SERPL-SCNC: 23 MMOL/L
CREAT SERPL-MCNC: 1.3 MG/DL
DIFFERENTIAL METHOD: ABNORMAL
EOSINOPHIL # BLD AUTO: 0.5 K/UL
EOSINOPHIL NFR BLD: 10.7 %
ERYTHROCYTE [DISTWIDTH] IN BLOOD BY AUTOMATED COUNT: 17.1 %
EST. GFR  (AFRICAN AMERICAN): >60 ML/MIN/1.73 M^2
EST. GFR  (NON AFRICAN AMERICAN): 56.1 ML/MIN/1.73 M^2
GLUCOSE SERPL-MCNC: 116 MG/DL
HCT VFR BLD AUTO: 23.6 %
HGB BLD-MCNC: 7.4 G/DL
IMM GRANULOCYTES # BLD AUTO: 0.01 K/UL
IMM GRANULOCYTES NFR BLD AUTO: 0.2 %
LYMPHOCYTES # BLD AUTO: 0.8 K/UL
LYMPHOCYTES NFR BLD: 16.8 %
MAGNESIUM SERPL-MCNC: 1.8 MG/DL
MCH RBC QN AUTO: 30.6 PG
MCHC RBC AUTO-ENTMCNC: 31.4 G/DL
MCV RBC AUTO: 98 FL
MONOCYTES # BLD AUTO: 0.5 K/UL
MONOCYTES NFR BLD: 9.4 %
NEUTROPHILS # BLD AUTO: 3 K/UL
NEUTROPHILS NFR BLD: 62.3 %
NRBC BLD-RTO: 0 /100 WBC
PHOSPHATE SERPL-MCNC: 3 MG/DL
PLATELET # BLD AUTO: 142 K/UL
PMV BLD AUTO: 11.2 FL
POCT GLUCOSE: 111 MG/DL (ref 70–110)
POTASSIUM SERPL-SCNC: 4.7 MMOL/L
RBC # BLD AUTO: 2.42 M/UL
SODIUM SERPL-SCNC: 140 MMOL/L
WBC # BLD AUTO: 4.87 K/UL

## 2018-07-13 PROCEDURE — 88341 IMHCHEM/IMCYTCHM EA ADD ANTB: CPT | Performed by: PATHOLOGY

## 2018-07-13 PROCEDURE — 88305 TISSUE EXAM BY PATHOLOGIST: CPT | Mod: 26,,, | Performed by: PATHOLOGY

## 2018-07-13 PROCEDURE — 83735 ASSAY OF MAGNESIUM: CPT

## 2018-07-13 PROCEDURE — 85025 COMPLETE CBC W/AUTO DIFF WBC: CPT

## 2018-07-13 PROCEDURE — 88275 CYTOGENETICS 100-300: CPT

## 2018-07-13 PROCEDURE — 25000003 PHARM REV CODE 250: Performed by: HOSPITALIST

## 2018-07-13 PROCEDURE — 84100 ASSAY OF PHOSPHORUS: CPT

## 2018-07-13 PROCEDURE — 92526 ORAL FUNCTION THERAPY: CPT

## 2018-07-13 PROCEDURE — 88271 CYTOGENETICS DNA PROBE: CPT

## 2018-07-13 PROCEDURE — 88275 CYTOGENETICS 100-300: CPT | Mod: 59

## 2018-07-13 PROCEDURE — 88311 DECALCIFY TISSUE: CPT | Mod: 26,,, | Performed by: PATHOLOGY

## 2018-07-13 PROCEDURE — 99238 HOSP IP/OBS DSCHRG MGMT 30/<: CPT | Mod: ,,, | Performed by: HOSPITALIST

## 2018-07-13 PROCEDURE — 88313 SPECIAL STAINS GROUP 2: CPT | Mod: 26,,, | Performed by: PATHOLOGY

## 2018-07-13 PROCEDURE — 88271 CYTOGENETICS DNA PROBE: CPT | Mod: 59

## 2018-07-13 PROCEDURE — 63600175 PHARM REV CODE 636 W HCPCS: Performed by: HOSPITALIST

## 2018-07-13 PROCEDURE — 88313 SPECIAL STAINS GROUP 2: CPT | Performed by: PATHOLOGY

## 2018-07-13 PROCEDURE — 88184 FLOWCYTOMETRY/ TC 1 MARKER: CPT | Performed by: PATHOLOGY

## 2018-07-13 PROCEDURE — 88342 IMHCHEM/IMCYTCHM 1ST ANTB: CPT | Mod: 26,59,, | Performed by: PATHOLOGY

## 2018-07-13 PROCEDURE — 81450 HL NEO GSAP 5-50DNA/DNA&RNA: CPT

## 2018-07-13 PROCEDURE — 88299 UNLISTED CYTOGENETIC STUDY: CPT

## 2018-07-13 PROCEDURE — 25000003 PHARM REV CODE 250: Performed by: INTERNAL MEDICINE

## 2018-07-13 PROCEDURE — 97535 SELF CARE MNGMENT TRAINING: CPT

## 2018-07-13 PROCEDURE — 88189 FLOWCYTOMETRY/READ 16 & >: CPT | Mod: ,,, | Performed by: PATHOLOGY

## 2018-07-13 PROCEDURE — 88185 FLOWCYTOMETRY/TC ADD-ON: CPT | Mod: 59 | Performed by: PATHOLOGY

## 2018-07-13 PROCEDURE — 38222 DX BONE MARROW BX & ASPIR: CPT | Mod: LT,,, | Performed by: INTERNAL MEDICINE

## 2018-07-13 PROCEDURE — 85097 BONE MARROW INTERPRETATION: CPT | Mod: ,,, | Performed by: PATHOLOGY

## 2018-07-13 PROCEDURE — 07DR3ZX EXTRACTION OF ILIAC BONE MARROW, PERCUTANEOUS APPROACH, DIAGNOSTIC: ICD-10-PCS | Performed by: INTERNAL MEDICINE

## 2018-07-13 PROCEDURE — 80048 BASIC METABOLIC PNL TOTAL CA: CPT

## 2018-07-13 PROCEDURE — 88313 SPECIAL STAINS GROUP 2: CPT

## 2018-07-13 PROCEDURE — 88264 CHROMOSOME ANALYSIS 20-25: CPT

## 2018-07-13 PROCEDURE — 99235 HOSP IP/OBS SAME DATE MOD 70: CPT | Mod: GC,,, | Performed by: INTERNAL MEDICINE

## 2018-07-13 PROCEDURE — 88237 TISSUE CULTURE BONE MARROW: CPT

## 2018-07-13 PROCEDURE — 88341 IMHCHEM/IMCYTCHM EA ADD ANTB: CPT | Mod: 26,,, | Performed by: PATHOLOGY

## 2018-07-13 RX ORDER — POTASSIUM CHLORIDE 20 MEQ/15ML
20 SOLUTION ORAL DAILY
Qty: 473 ML | Refills: 0 | Status: SHIPPED | OUTPATIENT
Start: 2018-07-13 | End: 2018-07-30 | Stop reason: SDUPTHER

## 2018-07-13 RX ORDER — METOPROLOL SUCCINATE 25 MG/1
12.5 TABLET, EXTENDED RELEASE ORAL DAILY
Qty: 15 TABLET | Refills: 11 | Status: SHIPPED | OUTPATIENT
Start: 2018-07-14 | End: 2018-07-30

## 2018-07-13 RX ORDER — LIDOCAINE HYDROCHLORIDE 20 MG/ML
10 INJECTION, SOLUTION INFILTRATION; PERINEURAL
Status: DISCONTINUED | OUTPATIENT
Start: 2018-07-13 | End: 2018-07-13 | Stop reason: HOSPADM

## 2018-07-13 RX ORDER — TAMSULOSIN HYDROCHLORIDE 0.4 MG/1
0.4 CAPSULE ORAL DAILY
Qty: 30 CAPSULE | Refills: 11 | Status: SHIPPED | OUTPATIENT
Start: 2018-07-14 | End: 2019-03-27 | Stop reason: SDUPTHER

## 2018-07-13 RX ADMIN — AMPICILLIN SODIUM 2 G: 2 INJECTION, POWDER, FOR SOLUTION INTRAVENOUS at 05:07

## 2018-07-13 RX ADMIN — METOPROLOL SUCCINATE 12.5 MG: 25 TABLET, EXTENDED RELEASE ORAL at 09:07

## 2018-07-13 RX ADMIN — ATORVASTATIN CALCIUM 40 MG: 20 TABLET, FILM COATED ORAL at 09:07

## 2018-07-13 RX ADMIN — CEFTRIAXONE SODIUM 2 G: 2 INJECTION, POWDER, FOR SOLUTION INTRAMUSCULAR; INTRAVENOUS at 07:07

## 2018-07-13 RX ADMIN — AMLODIPINE BESYLATE 10 MG: 10 TABLET ORAL at 09:07

## 2018-07-13 RX ADMIN — AMIODARONE HYDROCHLORIDE 200 MG: 200 TABLET ORAL at 09:07

## 2018-07-13 RX ADMIN — ASPIRIN 81 MG: 81 TABLET, COATED ORAL at 09:07

## 2018-07-13 RX ADMIN — AMPICILLIN SODIUM 2 G: 2 INJECTION, POWDER, FOR SOLUTION INTRAVENOUS at 06:07

## 2018-07-13 RX ADMIN — FUROSEMIDE 40 MG: 40 TABLET ORAL at 09:07

## 2018-07-13 RX ADMIN — CETIRIZINE HYDROCHLORIDE 5 MG: 5 TABLET, FILM COATED ORAL at 09:07

## 2018-07-13 RX ADMIN — PANTOPRAZOLE SODIUM 40 MG: 40 GRANULE, DELAYED RELEASE ORAL at 09:07

## 2018-07-13 RX ADMIN — LIDOCAINE HYDROCHLORIDE 10 ML: 20 INJECTION, SOLUTION INFILTRATION; PERINEURAL at 02:07

## 2018-07-13 RX ADMIN — POTASSIUM CHLORIDE 20 MEQ: 20 SOLUTION ORAL at 09:07

## 2018-07-13 RX ADMIN — TAMSULOSIN HYDROCHLORIDE 0.4 MG: 0.4 CAPSULE ORAL at 09:07

## 2018-07-13 NOTE — PROGRESS NOTES
Pt AA0x3 and VSS.  Two side rails up, bed locked, call light within reach. No falls noted as these precautions remain. Pt ambulated hallway independently and frequently. No complaints of pain. Hourly rounds made and no complaints at this time noted. Will resume with plan of care.

## 2018-07-13 NOTE — CONSULTS
"Patient seen with Dr. Go.  69 yo man with anemia.    Years ago he was able to donate blood.  He became ill in early 2018 and he became dependent on red cell transfusions in January 2018. His wife reports that he fatigued easily prior to initial problem with urinary retention, then acute bacterial endocarditis, then mitral valve replacement, the dysphagia (still using PEG) and now hematuria again. No pain. No fever or sweats now.  No lymphadenopathy or hepatosplenomegaly.    I reviewed his labs. Blood smear shows only anisocytosis.  Multiple tests for hemolysis, iron deficiency, B12 deficiency, paraproteins, etc. Have been normal or negative.    Recent CT shows a 5.2 cm "collection" over left psoas suggestive of a hematoma.    Impression: Anemia (primary marrow disease such as myelodysplasia and/or anemia of chronic disease seem most likely explanations.    Rec.:  1. Marrow exam--we will do today.  2. Blood for ESR, CRP and procalcitonin.  3. Additional investigation of area of abnormality near left psoas muscle.    Ej Diaz MD  "

## 2018-07-13 NOTE — PLAN OF CARE
Problem: SLP Goal  Goal: SLP Goal  Speech Language Pathology Goals  Goals expected to be met by 7/10/18:    1.  For therapeutic purposes only, pt will tolerate cup sips nectar thickened liquid with independent completion of double swallow per bolus with no overt clinical signs of aspiration. Discontinue/revise goal  2. Pt will ind'ly complete dysphagia exercises x10 each with good ability to strengthen the swallowing musculature. Ongoing goal    Goals expected to be met by 7/17/18:  1.  Ptt will tolerate 1/2 tsp boluses of puree consistencies with independent completion of double swallow per bolus with no overt clinical signs of aspiration.   2. Pt will ind'ly complete dysphagia exercises x10 each with good ability to strengthen the swallowing musculature.                Outcome: Ongoing (interventions implemented as appropriate)  Pt was seen today for ST session.     Virginia Mccall MS, CCC-SLP  Speech Language Pathologist  Pager: [phone]  7/13/2018

## 2018-07-13 NOTE — PROGRESS NOTES
[facility] Hospital Medicine  Progress Note    Patient Name: Rodolfo Messina  MRN: [id number]  Patient Class: IP- Inpatient   Admission Date: 6/29/2018  Length of Stay: 13 days  Attending Physician: Ramonita Rae MD  Primary Care Provider: Deric Claudio MD    [place] [facility] Medicine Team: [facility]  Ramonita Rae MD    Subjective:     Principal Problem:Symptomatic anemia    HPI:  No notes on file    Hospital Course:  No notes on file    Interval History: No acute issues. Feels well. Asking to go home.       Review of Systems   Constitutional: Negative for chills, fatigue and fever.   Respiratory: Negative for cough and shortness of breath.    Cardiovascular: Negative for chest pain and palpitations.   Gastrointestinal: Negative for abdominal pain.   Genitourinary: Negative for hematuria.     Objective:     Vital:  Reviewed and stable    Physical Exam   Constitutional: He appears well-developed and well-nourished. No distress.   Cardiovascular: Normal rate.    Pulmonary/Chest: Effort normal.   Abdominal: Soft.   Genitourinary:   Genitourinary Comments: Noted yellow urine; no hematuria    Skin:   Healing cabg scars    Vitals reviewed.      Significant Labs: All pertinent labs within the past 24 hours have been reviewed.    Significant Imaging: I have reviewed all pertinent imaging results/findings within the past 24 hours.    Assessment/Plan:      * Symptomatic anemia    - H&H stable today  - no reports of bleeding but FOBT positive  - patient declined cystoscopy that was planned due to resolution of hematura; can follow up outpatient   - case discussed with Dr. Go regarding anticoagulation and because the patient has had several bleeding complications and is now two months out from his surgery, the risks of continuing with warfarin outweighs the benefits at this time; can continue EC ASA 81 mg daily; heparin and warfarin discontinued; patient's wife aware and will update patient  - continue  pantoprazole   - hemodynamically stable   - VCE without evidence of bleeding  - left psoas fluid collection seen on 7/9 CT, could be source of anemia. Repeat CT today to see if progressed  - appreciate heme recs            Hematuria    - resolved  - follow up with urology outpatient           Hyperkalemia    Kayexalate x 1          H/O atrial flutter    - occurred during hospitalization for CABG and MVR  - no DCCV due to atrial thrombus  - converted with meds; continue amiodarone   - HRs well controlled           CKD (chronic kidney disease) stage 3, GFR 30-59 ml/min    - creatinine improved and stable   - monitor electrolytes with daily labs  - good UOP            Subtherapeutic international normalized ratio (INR)    - see symptomatic anemia         Rash of back              Dysphagia    - continue TFs  - to remain NPO per SLP   - changed from continuous to bolus feeds.            S/P MVR (mitral valve replacement)    - no warfarin per anemia           S/P CABG x 1    Resumed home ASA at 81 mg  Continue medical therapy           Chronic systolic congestive heart failure    - no acute issues  - continue medical management           Endocarditis with atrial thrombus    - endocarditis management per SBE            Subacute bacterial endocarditis    - remains afebrile and no leukocytosis  - cough predated the antibiotics   - ID recommended resumption of antibiotics; stop date 7/13            VTE Risk Mitigation     None              Ramonita Rae MD  Department of Hospital Medicine   [facility]

## 2018-07-13 NOTE — ASSESSMENT & PLAN NOTE
The patient has a normocytic anemia present for some years as the patient states he has not been able to donate blood for the last couple of years.  -The patient has been assessed and negative for MM with negative SPEP, hemolytic anemia with negative normal LDH and haptoglobin, nutritional deficiencies with normal B12 and folate, iron deficiency with elevated ferritin of 836 on 3/25/18  -Differential for the patient's anemia include anemia of chronic disease, MDS, and blood loss  -BM biopsy performed today to assess for MDS  -Will f/u with the patient upon d/c and repeat studies assessing for anemia of chronic disease  -The patient initially had blood loss from hematuria which has now resolved.   -Fluid collection on CT renal stone study was concerning for hematoma which appears to be resolving on repeat CT abdomen and pelvis completed today.  -Will f/u the results of the BM biopsy  -Will have the patient follow up in our clinic to discuss results of the BM biopsy and NGS.

## 2018-07-13 NOTE — PT/OT/SLP PROGRESS
Speech Language Pathology Treatment    Patient Name:  Rodolfo Messina   MRN:  [id number]  Admitting Diagnosis: Symptomatic anemia    Recommendations:                 General Recommendations:  Dysphagia therapy  Diet recommendations:  NPO, Liquid Diet Level: NPO   Aspiration Precautions: Alternate means of nutrition/hydration and Strict aspiration precautions   For pleasure, pt appears safe for pureed solids via 1/2 tsp x3-5 MAX, 3-5x/ day with consistent implementation of all the following aspiration precautions  Aspiration Precautions: Strict aspiration precautions   · Fully awake and alert for PO intake  · Fully upright position for PO intake  · Small bites: 1/2 tsp size bolus MAX  · Slow rate of eating/ drinking  · 1 bite  @ a time  · Refrain from talking prior to swallow completion   · Remain upright for 20-30 min post PO intake   · Discontinue PO upon: coughing, throat clearing, choking, wet vocal quality, wet breath sounds, watery eyes, reddened facial features  General Precautions: Standard, aspiration, fall  Communication strategies:  none    Subjective   Awake & alert. NSG at bedside reports pt going for CT of abdomen this afternoon therefore SLP was asked to limit PO trials. Spouse present for beginning of session.     Pain/Comfort:  · Pain Rating 1: 0/10  · Pain Rating Post-Intervention 2: 0/10    Objective:     Has the patient been evaluated by SLP for swallowing?   Yes  Keep patient NPO? Yes   Current Respiratory Status: room air      Pt seated upright. Spouse located list of dysphagia exercises in room & reports that they have been practicing IND'ly. Pt demonstrated each exercise listed x1 with good effort & ability. Pt tolerated ice chips x3 & 1/2 tsp bites of puree x2 with no immediate s/s aspiration yet delayed throat clear present. Pt with no change in vocal quality pre/post trials & adequate timing & hyolaryngeal excursion & rise of swallow felt via digital palpation. Pt educated on continued risk  of aspiration, he verbalized understanding.     Assessment:     Rodolfo Messina is a 68 y.o. male with an SLP diagnosis of Dysphagia    Goals:    SLP Goals        Problem: SLP Goal    Goal Priority Disciplines Outcome   SLP Goal     SLP Ongoing (interventions implemented as appropriate)   Description:  Speech Language Pathology Goals  Goals expected to be met by 7/10/18:    1.  For therapeutic purposes only, pt will tolerate cup sips nectar thickened liquid with independent completion of double swallow per bolus with no overt clinical signs of aspiration. Discontinue/revise goal  2. Pt will ind'ly complete dysphagia exercises x10 each with good ability to strengthen the swallowing musculature. Ongoing goal    Goals expected to be met by 7/17/18:  1.  Ptt will tolerate 1/2 tsp boluses of puree consistencies with independent completion of double swallow per bolus with no overt clinical signs of aspiration.   2. Pt will ind'ly complete dysphagia exercises x10 each with good ability to strengthen the swallowing musculature.                                 Plan:     · Patient to be seen:  3 x/week   · Plan of Care expires:  07/31/18  · Plan of Care reviewed with:  patient, spouse   · SLP Follow-Up:  Yes       Discharge recommendations:  home health speech therapy (outpatient MBSS in 4-6 weeks)     Time Tracking:     SLP Treatment Date:   07/13/18  Speech Start Time:  1051  Speech Stop Time:  1107     Speech Total Time (min):  16 min    Billable Minutes: Treatment Swallowing Dysfunction 8 and Seld Care/Home Management Training 8    Virginia Mccall CCC-SLP  07/13/2018

## 2018-07-13 NOTE — PROGRESS NOTES
Pt discharged. Instructions and prescriptions given and explained. Pt verbalized understanding with no questions. Pt AAOx3, VSS. Will remove PICC before discharged per order from MD Rae

## 2018-07-13 NOTE — PLAN OF CARE
[facility]    HOME HEALTH ORDERS  FACE TO FACE ENCOUNTER    Patient Name: Rodolfo Messina  YOB: 1950    PCP: Deric Claudio MD   PCP Address: [address] [facility] [place] [address]  PCP Phone Number: [phone]  PCP Fax: [phone]    Encounter Date: 07/13/2018    Admit to Home Health    Diagnoses:  Active Hospital Problems    Diagnosis  POA    *Symptomatic anemia [D64.9]  Yes     Priority: 1 - High    Hematuria [R31.9]  Yes     Priority: 3     Hyperkalemia [E87.5]  Yes    H/O atrial flutter [Z86.79]  Not Applicable    Pressure injury of skin, stage 2 [L89.92]  Yes    CKD (chronic kidney disease) stage 3, GFR 30-59 ml/min [N18.3]  Yes    Subtherapeutic international normalized ratio (INR) [R79.1]  No    Dysphagia [R13.10]  Yes    S/P MVR (mitral valve replacement) [Z95.2]  Not Applicable    S/P CABG x 1 [Z95.1]  Not Applicable    Chronic systolic congestive heart failure [I50.22]  Yes    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]  Yes    Subacute bacterial endocarditis [I33.0]  Yes      Resolved Hospital Problems    Diagnosis Date Resolved POA    Hypernatremia [E87.0] 07/07/2018 Yes     Priority: 2     Acute on chronic systolic heart failure [I50.23] 07/04/2018 Yes     Priority: 3     Supratherapeutic INR [R79.1] 07/04/2018 Yes     Priority: 4        Future Appointments  Date Time Provider Department Center   7/17/2018 10:40 AM Deric Claudio MD [facility]   7/18/2018 1:30 PM Andre Ashraf MD [facility] ID [address]   7/26/2018 1:00 PM Delmy Gomes NP [facility] UROLOGY [address]     Follow-up Information     Schedule an appointment as soon as possible for a visit with Deric Claudio MD.    Specialty:  Family Medicine  Contact information:  [address]   [place] [address]  [phone]             Schedule an appointment as soon as possible for a visit with [facility].    Specialty:  Hematology and Oncology  Contact  information:  [address]  [facility] [address] [phone]  Additional information:  [facility] - 3rd Floor                   I have seen and examined this patient face to face today. My clinical findings that support the need for the home health skilled services and home bound status are the following:  Weakness/numbness causing balance and gait disturbance due to Weakness/Debility making it taxing to leave home.    Allergies:  Review of patient's allergies indicates:   Allergen Reactions    Shellfish containing products     Augmentin [amoxicillin-pot clavulanate]      Patient felt that it raised BP and requested to have it added as intolerance. Tolerated unasyn and ampicillin.    Ciprofloxacin     Flagyl [metronidazole]     Lisinopril Other (See Comments)     cough    Mysoline [primidone]     Omnicef [cefdinir]        Diet: low fat, low cholesterol diet  Tube Feeds: Isosource 1.5 (5 cans/day total, 1 can every 4-5 hours). Free water flushes 500mL every 4 hours.       Activities: activity as tolerated    Nursing:   SN to complete comprehensive assessment including routine vital signs. Instruct on disease process and s/s of complications to report to MD. Review/verify medication list sent home with the patient at time of discharge  and instruct patient/caregiver as needed. Frequency may be adjusted depending on start of care date.    Notify MD if SBP > 160 or < 90; DBP > 90 or < 50; HR > 120 or < 50; Temp > 101; Other:         CONSULTS:    Physical Therapy to evaluate and treat. Evaluate for home safety and equipment needs; Establish/upgrade home exercise program. Perform / instruct on therapeutic exercises, gait training, transfer training, and Range of Motion.  Occupational Therapy to evaluate and treat. Evaluate home environment for safety and equipment needs. Perform/Instruct on transfers, ADL training, ROM, and therapeutic exercises.  Speech Therapy  to evaluate and treat  for  Swallowing.    MISCELLANEOUS CARE:  PEG Care:  Instruct patient/caregiver to clean site.  Monitor skin integrity.    WOUND CARE ORDERS  n/a      Medications: Review discharge medications with patient and family and provide education.      Current Discharge Medication List      START taking these medications    Details   metoprolol succinate (TOPROL-XL) 25 MG 24 hr tablet Take 0.5 tablets (12.5 mg total) by mouth once daily.  Qty: 15 tablet, Refills: 11      tamsulosin (FLOMAX) 0.4 mg Cap 1 capsule (0.4 mg total) by Per G Tube route once daily.  Qty: 30 capsule, Refills: 11         CONTINUE these medications which have CHANGED    Details   potassium chloride 10% (KAYCIEL) 20 mEq/15 mL oral solution 15 mLs (20 mEq total) by Per G Tube route once daily.  Qty: 473 mL, Refills: 0         CONTINUE these medications which have NOT CHANGED    Details   albuterol-ipratropium 2.5mg-0.5mg/3mL (DUO-NEB) 0.5 mg-3 mg(2.5 mg base)/3 mL nebulizer solution 3 mLs every 4 (four) hours as needed.       amiodarone (PACERONE) 200 MG Tab 1 tablet (200 mg total) by Per G Tube route once daily.  Qty: 30 tablet, Refills: 11      amLODIPine (NORVASC) 10 MG tablet 1 tablet (10 mg total) by Per G Tube route once daily.  Qty: 30 tablet, Refills: 11      aspirin 81 MG Chew 4 tablets (324 mg total) by Per G Tube route once daily.  Refills: 0      atorvastatin (LIPITOR) 40 MG tablet 1 tablet (40 mg total) by Per G Tube route once daily.  Qty: 90 tablet, Refills: 3      benzonatate (TESSALON) 100 MG capsule Take 100 mg by mouth 3 (three) times daily as needed for Cough.      furosemide (LASIX) 40 MG tablet Take 1 tablet (40 mg total) by mouth once daily.  Qty: 30 tablet, Refills: 1      loratadine (CLARITIN) 10 mg tablet Take 10 mg by mouth once daily.      mirtazapine (REMERON) 30 MG tablet Take 1 tablet (30 mg total) by mouth every evening.  Qty: 90 tablet, Refills: 3      pantoprazole (PROTONIX) 40 MG tablet Take 1 tablet (40 mg total) by  mouth once daily.  Qty: 30 tablet, Refills: 5      VENTOLIN HFA 90 mcg/actuation inhaler          STOP taking these medications       ampicillin sodium (AMPICILLIN 2 G/100 ML NS, READY TO MIX SYSTEM,) Comments:   Reason for Stopping:         cefTRIAXone 2 g in dextrose 5 % 50 mL (ROCEPHIN) 2 g/50 mL PgBk IVPB Comments:   Reason for Stopping:         doxazosin (CARDURA) 4 MG tablet Comments:   Reason for Stopping:         hydrALAZINE (APRESOLINE) 25 MG tablet Comments:   Reason for Stopping:         warfarin (COUMADIN) 5 MG tablet Comments:   Reason for Stopping:               I certify that this patient is confined to his home and needs physical therapy, speech therapy and occupational therapy.

## 2018-07-13 NOTE — SUBJECTIVE & OBJECTIVE
Oncology Treatment Plan:   [No treatment plan]    Medications:  Continuous Infusions:  Scheduled Meds:   amiodarone  200 mg Per G Tube Daily    amLODIPine  10 mg Per G Tube Daily    aspirin  81 mg Oral Daily    atorvastatin  40 mg Per G Tube Daily    cefTRIAXone (ROCEPHIN) IVPB  2 g Intravenous Q12H    cetirizine  5 mg Per G Tube Daily    furosemide  40 mg Oral Daily    metoprolol succinate  12.5 mg Oral Daily    mirtazapine  30 mg Per G Tube QHS    pantoprazole  40 mg Per G Tube Daily    potassium chloride 10%  20 mEq Per G Tube BID    tamsulosin  0.4 mg Per G Tube Daily     PRN Meds:albuterol-ipratropium, dextrose 50%, dextrose 50%, dextrose 50%, dextrose 50%, glucagon (human recombinant), glucagon (human recombinant), glucose, glucose, glucose, glucose, guaifenesin 100 mg/5 ml, insulin aspart U-100, lidocaine HCL 20 mg/ml (2%), sodium chloride 0.9%     Review of patient's allergies indicates:   Allergen Reactions    Shellfish containing products     Augmentin [amoxicillin-pot clavulanate]      Patient felt that it raised BP and requested to have it added as intolerance. Tolerated unasyn and ampicillin.    Ciprofloxacin     Flagyl [metronidazole]     Lisinopril Other (See Comments)     cough    Mysoline [primidone]     Omnicef [cefdinir]         Past Medical History:   Diagnosis Date    ANNA (acute kidney injury) 2/22/2018    Anemia     Anxiety     Arthritis     BPH (benign prostatic hyperplasia)     CHF (congestive heart failure) 5/23/2018    Coarse tremors     Coronary artery disease of native artery of native heart with stable angina pectoris 5/21/2018    Diverticulosis     Encounter for blood transfusion     Endocarditis     Hypertension     Urinary retention      Past Surgical History:   Procedure Laterality Date    APPENDECTOMY      COLONOSCOPY      COLONOSCOPY N/A 2/1/2018    Procedure: COLONOSCOPY;  Surgeon: Richar Payan MD;  Location: [facility] ([address] [place] [address]);  Service:  Endoscopy;  Laterality: N/A;  PM prep    CREATION OF AORTOCORONARY ARTERY BYPASS N/A 5/25/2018    Procedure: AORTOCORONARY BYPASS-CABG;  Surgeon: Marvin Go MD;  Location: [address];  Service: Cardiovascular;  Laterality: N/A;    EYE SURGERY Right     cataract extraction    FINGER SURGERY      FRACTURE SURGERY Right     index finger    MITRAL VALVE REPLACEMENT N/A 5/25/2018    Procedure: Mitral Valve Replacement;  Surgeon: Marvin Go MD;  Location: [address];  Service: Cardiovascular;  Laterality: N/A;    TONSILLECTOMY       Family History     Problem Relation (Age of Onset)    Heart disease Mother, Father    No Known Problems Sister, Brother    Stroke Mother        Social History Main Topics    Smoking status: Never Smoker    Smokeless tobacco: Never Used    Alcohol use No      Comment: none recently    Drug use: No    Sexual activity: Not Currently     Partners: Female       Review of Systems   Constitutional: Positive for unexpected weight change. Negative for chills, diaphoresis, fatigue and fever.   HENT: Positive for trouble swallowing. Negative for sore throat and voice change.    Eyes: Negative for photophobia and visual disturbance.   Respiratory: Negative for cough, chest tightness and shortness of breath.    Cardiovascular: Negative for chest pain, palpitations and leg swelling.   Gastrointestinal: Negative for abdominal pain, constipation, diarrhea, nausea and vomiting.   Endocrine: Negative for cold intolerance and heat intolerance.   Genitourinary: Negative for difficulty urinating, dysuria and hematuria.   Musculoskeletal: Negative for arthralgias, back pain and myalgias.   Skin: Negative for color change and rash.   Neurological: Negative for dizziness, light-headedness, numbness and headaches.     Objective:     Vital Signs (Most Recent):  Temp: 97.2 °F (36.2 °C) (07/13/18 1109)  Pulse: 84 (07/13/18 1109)  Resp: 16 (07/13/18 1109)  BP: 125/65 (07/13/18 1109)  SpO2: (!)  94 % (07/13/18 1109) Vital Signs (24h Range):  Temp:  [96.1 °F (35.6 °C)-98.7 °F (37.1 °C)] 97.2 °F (36.2 °C)  Pulse:  [80-87] 84  Resp:  [16] 16  SpO2:  [94 %-99 %] 94 %  BP: (106-128)/(59-67) 125/65     Weight: 71.3 kg (157 lb 3 oz)  Body mass index is 22.55 kg/m².  Body surface area is 1.88 meters squared.    No intake or output data in the 24 hours ending 07/13/18 1558    Physical Exam   Constitutional: He is oriented to person, place, and time. He appears well-developed and well-nourished. No distress.   HENT:   Head: Normocephalic and atraumatic.   Mouth/Throat: Oropharynx is clear and moist. No oropharyngeal exudate.   Eyes: Right eye exhibits no discharge. Left eye exhibits no discharge. No scleral icterus.   Cardiovascular: Normal rate, regular rhythm, normal heart sounds and intact distal pulses.  Exam reveals no gallop and no friction rub.    No murmur heard.  Pulmonary/Chest: Effort normal and breath sounds normal. No respiratory distress. He has no wheezes. He has no rales. He exhibits no tenderness.   Abdominal: Soft. Bowel sounds are normal. He exhibits no distension and no mass. There is no tenderness. There is no rebound.   Musculoskeletal: Normal range of motion. He exhibits no edema or tenderness.   Lymphadenopathy:        Head (right side): No submental and no submandibular adenopathy present.        Head (left side): No submental and no submandibular adenopathy present.     He has no cervical adenopathy.     He has no axillary adenopathy.        Right: No supraclavicular adenopathy present.        Left: No supraclavicular adenopathy present.   Neurological: He is alert and oriented to person, place, and time. No cranial nerve deficit. Coordination normal.   Skin: Skin is warm and dry. No rash noted. He is not diaphoretic. No erythema.   Psychiatric: He has a normal mood and affect. His behavior is normal.       Significant Labs:   Recent Results (from the past 24 hour(s))   POCT glucose     Collection Time: 07/12/18  5:19 PM   Result Value Ref Range    POCT Glucose 129 (H) 70 - 110 mg/dL   CBC auto differential    Collection Time: 07/13/18  3:54 AM   Result Value Ref Range    WBC 4.87 3.90 - 12.70 K/uL    RBC 2.42 (L) 4.60 - 6.20 M/uL    Hemoglobin 7.4 (L) 14.0 - 18.0 g/dL    Hematocrit 23.6 (L) 40.0 - 54.0 %    MCV 98 82 - 98 fL    MCH 30.6 27.0 - 31.0 pg    MCHC 31.4 (L) 32.0 - 36.0 g/dL    RDW 17.1 (H) 11.5 - 14.5 %    Platelets 142 (L) 150 - 350 K/uL    MPV 11.2 9.2 - 12.9 fL    Immature Granulocytes 0.2 0.0 - 0.5 %    Gran # (ANC) 3.0 1.8 - 7.7 K/uL    Immature Grans (Abs) 0.01 0.00 - 0.04 K/uL    Lymph # 0.8 (L) 1.0 - 4.8 K/uL    Mono # 0.5 0.3 - 1.0 K/uL    Eos # 0.5 0.0 - 0.5 K/uL    Baso # 0.03 0.00 - 0.20 K/uL    nRBC 0 0 /100 WBC    Gran% 62.3 38.0 - 73.0 %    Lymph% 16.8 (L) 18.0 - 48.0 %    Mono% 9.4 4.0 - 15.0 %    Eosinophil% 10.7 (H) 0.0 - 8.0 %    Basophil% 0.6 0.0 - 1.9 %    Differential Method Automated    Magnesium    Collection Time: 07/13/18  3:54 AM   Result Value Ref Range    Magnesium 1.8 1.6 - 2.6 mg/dL   Phosphorus    Collection Time: 07/13/18  3:54 AM   Result Value Ref Range    Phosphorus 3.0 2.7 - 4.5 mg/dL   Basic metabolic panel    Collection Time: 07/13/18  3:54 AM   Result Value Ref Range    Sodium 140 136 - 145 mmol/L    Potassium 4.7 3.5 - 5.1 mmol/L    Chloride 111 (H) 95 - 110 mmol/L    CO2 23 23 - 29 mmol/L    Glucose 116 (H) 70 - 110 mg/dL    BUN, Bld 21 8 - 23 mg/dL    Creatinine 1.3 0.5 - 1.4 mg/dL    Calcium 8.6 (L) 8.7 - 10.5 mg/dL    Anion Gap 6 (L) 8 - 16 mmol/L    eGFR if African American >60.0 >60 mL/min/1.73 m^2    eGFR if non  56.1 (A) >60 mL/min/1.73 m^2     Haptoglobin normal 180 7/10/18  LDH normal 180 7/10/18    SPEP normal 2/23/18    Diagnostic Results:  7/09/18 CT Renal Stone Study    No abnormality identified to account for this patient's hematuria on this noncontrast examination.    New 5.2 cm heterogeneous left psoas collection,  likely correlating with an intramuscular hematoma given this patient's history of decreasing hemoglobin.    Moderate bilateral pleural effusions with associated compressive atelectatic change and diffuse ground-glass opacification which may relate to infectious or noninfectious inflammatory change versus edema.  These findings have improved since CT 06/15/2018.    Hyperdense material fills the gallbladder, most commonly seen in the setting of vicarious excretion of contrast.  Clinical correlation for recent contrast administration is advised.    Additional findings include:    -Postsurgical change of mitral valve replacement    -Gastrojejunostomy tube    -Right renal cyst    -Bladder diverticulum    -Diverticulosis    -Aortic ectasia and atherosclerosis    7/13/18 CT Abdomen and Pelvis    Slight interval decrease in size of a left psoas intramuscular collection, most compatible with hematoma.    Stable moderate bilateral pleural effusions with associated atelectatic change.    Multiple additional stable findings include mitral valve prosthesis, gastrojejunostomy tube, right renal cyst, bladder diverticulum, diverticulosis, aortic ectasia and atherosclerosis.

## 2018-07-13 NOTE — SUBJECTIVE & OBJECTIVE
Interval History: No acute issues. Feels well. Asking to go home.       Review of Systems   Constitutional: Negative for chills, fatigue and fever.   Respiratory: Negative for cough and shortness of breath.    Cardiovascular: Negative for chest pain and palpitations.   Gastrointestinal: Negative for abdominal pain.   Genitourinary: Negative for hematuria.     Objective:     Vital:  Reviewed and stable    Physical Exam   Constitutional: He appears well-developed and well-nourished. No distress.   Cardiovascular: Normal rate.    Pulmonary/Chest: Effort normal.   Abdominal: Soft.   Genitourinary:   Genitourinary Comments: Noted yellow urine; no hematuria    Skin:   Healing cabg scars    Vitals reviewed.      Significant Labs: All pertinent labs within the past 24 hours have been reviewed.    Significant Imaging: I have reviewed all pertinent imaging results/findings within the past 24 hours.

## 2018-07-13 NOTE — ASSESSMENT & PLAN NOTE
- H&H stable today  - no reports of bleeding but FOBT positive  - patient declined cystoscopy that was planned due to resolution of hematura; can follow up outpatient   - case discussed with Dr. Go regarding anticoagulation and because the patient has had several bleeding complications and is now two months out from his surgery, the risks of continuing with warfarin outweighs the benefits at this time; can continue EC ASA 81 mg daily; heparin and warfarin discontinued; patient's wife aware and will update patient  - continue pantoprazole   - hemodynamically stable   - VCE without evidence of bleeding  - left psoas fluid collection seen on 7/9 CT, could be source of anemia. Repeat CT today to see if progressed  - appreciate heme recs

## 2018-07-13 NOTE — DISCHARGE SUMMARY
[facility] Hospital Medicine  Discharge Summary      Patient Name: Rodolfo Messina  MRN: [id number]  Admission Date: 6/29/2018  Hospital Length of Stay: 13 days  Discharge Date and Time: No discharge date for patient encounter.  Attending Physician: Ramonita Rae MD   Discharging Provider: Ramonita Rae MD  Primary Care Provider: Deric Claudio MD  [facility] Medicine Team: [facility] HOSP MED  Ramonita Rae MD    HPI:   68M h/o HFrEF, MR s/p MVR (5/2018), CAD s/p CABG (5/2018), dysphagia s/p PEG, hypernatremia, anemia, SBE on rocephin / amp, presenting with hematuria after lackey was removed.     Patient with complicated recent medical hx, with prolonged 28d hospitalization 5/2018 after CABG / MVR with course complicated by SBE - started on rocephin / amp end date 7/13, dysphagia - s/p PEG, and urinary retention with lackey placement (recently removed 6/26 by urology). Pt was recently admitted to [facility] 6/23 for anemia, Hb 6.7 at that time, down from 7.0 6/20 and with dyspnea thought to be related to symptomatic anemia, pt mildly volume overloaded as well, was diuresed and transfused 2u PRBC with discharge Hb of 8.1     Per above, pt saw urology 6/26 for indwelling lackey / urinary retention, underwent voiding trial and patient passed, lackey was removed, to continue flomax and stop doxazosin.      6/28 pt began to note blood in urine, worsening to point appearing to be willa blood, presented to ED 6/29. Pt denies other sources of bleeding (no hematochezia, melena), denies worsening SOB/chest pain/palpitations, denies dysuria or difficulty urinating    Procedure(s) (LRB):  CYSTOSCOPY, WITH RETROGRADE PYELOGRAM (N/A)      Hospital Course:   * Symptomatic anemia     - H&H stable  - no reports of bleeding but FOBT positive  - patient declined cystoscopy that was planned due to resolution of hematura; can follow up outpatient   - case discussed with Dr. Go regarding anticoagulation and because  the patient has had several bleeding complications and is now two months out from his surgery, the risks of continuing with warfarin outweighs the benefits at this time; can continue EC ASA 81 mg daily; heparin and warfarin discontinued; patient's wife aware and will update patient  - continue pantoprazole   - hemodynamically stable   - VCE without evidence of bleeding - GI has no further recommendations  - left psoas fluid collection seen on 7/9 CT, could be source of anemia. Repeated CT and has improved however.  - Heme did BMB 7/13, follow in clinic in 1 weeks for results                Hematuria     - resolved. Repeat UA negative  - follow up with urology outpatient for cystoscopy                       H/O atrial flutter     - occurred during hospitalization for CABG and MVR  - no DCCV due to atrial thrombus  - converted with meds; continue amiodarone, BB  - HRs well controlled              CKD (chronic kidney disease) stage 3, GFR 30-59 ml/min     - creatinine improved and stable                                           Dysphagia with PEG     - continue TFs  - MBSS done. To remain NPO per SLP   - changed from continuous to bolus feeds - isosource 1.5 dannie (5 cans/day). Free water flushes 500 cc q4                S/P MVR (mitral valve replacement)     - warfarin dc'ed per Dr Go due to bleeding/anemia  - asa 81 mg daily          S/P CABG x 1     Resumed home ASA at 81 mg, statin             Chronic systolic congestive heart failure     - no acute issues  - continue medical management                         Subacute bacterial endocarditis     - remains afebrile and no leukocytosis  - repeated BCx's prior to stopping abx, negative  - completed ampicillin, rocephin - stop date 7/13  - ID f/u upcoming        Consults:   Consults         Status Ordering Provider     Inpatient consult to Gastroenterology  Once     Provider:  (Not yet assigned)    Completed TIM BOLDEN     Inpatient consult to Hematology  Once      Provider:  (Not yet assigned)    Completed EVELIO VALDIVIA     Inpatient consult to Infectious Diseases  Once     Provider:  (Not yet assigned)    Completed TIM BOLDEN     Inpatient consult to Registered Dietitian/Nutritionist  Once     Provider:  (Not yet assigned)    Completed EVELIO VALDIVIA     Inpatient consult to Registered Dietitian/Nutritionist  Once     Provider:  (Not yet assigned)    Completed TIM BOLDEN     Inpatient consult to Registered Dietitian/Nutritionist  Once     Provider:  (Not yet assigned)    Completed EVELIO VALDIVIA     Inpatient consult to Urology  Once     Provider:  (Not yet assigned)    Completed ARIS MEJIA          No new Assessment & Plan notes have been filed under this hospital service since the last note was generated.  Service: Hospital Medicine    Final Active Diagnoses:    Diagnosis Date Noted POA    PRINCIPAL PROBLEM:  Symptomatic anemia [D64.9] 02/22/2018 Yes    Hematuria [R31.9] 06/29/2018 Yes    Hyperkalemia [E87.5] 07/10/2018 Yes    H/O atrial flutter [Z86.79] 07/09/2018 Not Applicable    Pressure injury of skin, stage 2 [L89.92] 07/09/2018 Yes    CKD (chronic kidney disease) stage 3, GFR 30-59 ml/min [N18.3] 07/08/2018 Yes    Subtherapeutic international normalized ratio (INR) [R79.1] 07/07/2018 No    Anemia [D64.9] 06/23/2018 Yes    Dysphagia [R13.10] 06/08/2018 Yes    S/P MVR (mitral valve replacement) [Z95.2] 05/25/2018 Not Applicable    S/P CABG x 1 [Z95.1] 05/25/2018 Not Applicable    Chronic systolic congestive heart failure [I50.22] 05/23/2018 Yes    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] 02/28/2018 Yes    Subacute bacterial endocarditis [I33.0] 02/27/2018 Yes      Problems Resolved During this Admission:    Diagnosis Date Noted Date Resolved POA    Hypernatremia [E87.0] 06/23/2018 07/07/2018 Yes    Acute on chronic systolic heart failure [I50.23] 07/01/2018 07/04/2018 Yes    Supratherapeutic INR [R79.1] 06/30/2018 07/04/2018 Yes        Discharged Condition: fair    Disposition: Home or Self Care    Follow Up:  Follow-up Information     Schedule an appointment as soon as possible for a visit with Deric Claudio MD.    Specialty:  Family Medicine  Contact information:  [address] [facility] [place] [address] [phone]             Schedule an appointment as soon as possible for a visit with [facility] Hematology Oncology.    Specialty:  Hematology and Oncology  Contact information:  [address]  [facility] [address] [phone]  Additional information:  [facility] - 3rd Floor               Patient Instructions:     Ambulatory Referral to Hematology / Oncology   Referral Priority: Routine Referral Type: Consultation   Referral Reason: Specialty Services Required    Requested Specialty: Hematology and Oncology    Number of Visits Requested: 1          Significant Diagnostic Studies: Labs:   CMP   Recent Labs  Lab 07/12/18  0437 07/13/18  0354    140   K 5.1 4.7   * 111*   CO2 24 23    116*   BUN 22 21   CREATININE 1.4 1.3   CALCIUM 8.6* 8.6*   ANIONGAP 6* 6*   ESTGFRAFRICA 59.3* >60.0   EGFRNONAA 51.3* 56.1*    and CBC   Recent Labs  Lab 07/12/18  0437 07/13/18  0354   WBC 6.18 4.87   HGB 7.5* 7.4*   HCT 23.8* 23.6*   * 142*       Pending Diagnostic Studies:     Procedure Component Value Units Date/Time    Bone Marrow Prep and Stain [[id number]] Collected:  07/13/18 1519    Order Status:  Sent Lab Status:  In process Updated:  07/13/18 1527    Specimen:  Bone Marrow from Bone Marrow     Heme Disorders DNA/RNA Hold, Bone Marrow [[id number]] Collected:  07/13/18 1519    Order Status:  Sent Lab Status:  In process Updated:  07/13/18 1604    Specimen:  Bone Marrow from Bone Marrow     Iron Stain, Bone Marrow [[id number]] Collected:  07/13/18 1519    Order Status:  Sent Lab Status:  In process Updated:  07/13/18 1527    Specimen:  Bone Marrow from Bone Marrow     Tissue Specimen to Pathology, Bone  Marrow Aspiration/Biopsy Procedure [[id number]] Collected:  07/13/18 1512    Order Status:  Sent Lab Status:  In process Updated:  07/13/18 1615    Specimen:  Bone Marrow from Bone Marrow Aspirate, Right Iliac Crest          Medications:  Reconciled Home Medications:      Medication List      START taking these medications    metoprolol succinate 25 MG 24 hr tablet  Commonly known as:  TOPROL-XL  Take 0.5 tablets (12.5 mg total) by mouth once daily.  Start taking on:  7/14/2018     tamsulosin 0.4 mg Cap  Commonly known as:  FLOMAX  1 capsule (0.4 mg total) by Per G Tube route once daily.  Start taking on:  7/14/2018        CHANGE how you take these medications    potassium chloride 10% 20 mEq/15 mL oral solution  Commonly known as:  KAYCIEL  15 mLs (20 mEq total) by Per G Tube route once daily.  What changed:  when to take this        CONTINUE taking these medications    albuterol-ipratropium 2.5 mg-0.5 mg/3 mL nebulizer solution  Commonly known as:  DUO-NEB  3 mLs every 4 (four) hours as needed.     amiodarone 200 MG Tab  Commonly known as:  PACERONE  1 tablet (200 mg total) by Per G Tube route once daily.     amLODIPine 10 MG tablet  Commonly known as:  NORVASC  1 tablet (10 mg total) by Per G Tube route once daily.     aspirin 81 MG Chew  4 tablets (324 mg total) by Per G Tube route once daily.     atorvastatin 40 MG tablet  Commonly known as:  LIPITOR  1 tablet (40 mg total) by Per G Tube route once daily.     benzonatate 100 MG capsule  Commonly known as:  TESSALON  Take 100 mg by mouth 3 (three) times daily as needed for Cough.     furosemide 40 MG tablet  Commonly known as:  LASIX  Take 1 tablet (40 mg total) by mouth once daily.     loratadine 10 mg tablet  Commonly known as:  CLARITIN  Take 10 mg by mouth once daily.     mirtazapine 30 MG tablet  Commonly known as:  REMERON  Take 1 tablet (30 mg total) by mouth every evening.     pantoprazole 40 MG tablet  Commonly known as:  PROTONIX  Take 1 tablet (40 mg  total) by mouth once daily.     VENTOLIN HFA 90 mcg/actuation inhaler  Generic drug:  albuterol        STOP taking these medications    AMPICILLIN 2 G/100 ML NS (READY TO MIX SYSTEM)     cefTRIAXone 2 g in dextrose 5 % 50 mL 2 g/50 mL Pgbk IVPB  Commonly known as:  ROCEPHIN     doxazosin 4 MG tablet  Commonly known as:  CARDURA     hydrALAZINE 25 MG tablet  Commonly known as:  APRESOLINE     warfarin 5 MG tablet  Commonly known as:  COUMADIN            Indwelling Lines/Drains at time of discharge:   Lines/Drains/Airways     Peripherally Inserted Central Catheter Line                 PICC Double Lumen -- days         PICC Double Lumen 03/05/18 1539 right basilic 130 days         PICC Double Lumen 06/04/18 1654 left brachial 38 days         PICC Double Lumen 06/15/18 2200 left brachial 27 days                    Drain                 Gastrostomy/Enterostomy LUQ -- days         Gastrostomy/Enterostomy 06/07/18 1657 Gastrostomy-jejunostomy LUQ 35 days         Gastrostomy/Enterostomy 06/29/18 2200 Gastrostomy-jejunostomy 13 days          Pressure Ulcer                 Pressure Injury 06/23/18 0710 Right medial Buttocks Stage 2 20 days         Pressure Injury 07/07/18 2000 Gluteal cleft Stage 2 5 days                Time spent on the discharge of patient: 25 minutes  Patient was seen and examined on the date of discharge and determined to be suitable for discharge.         Ramonita Rae MD  Department of Hospital Medicine  [facility]

## 2018-07-13 NOTE — CONSULTS
[facility] [address]  Hematology/Oncology  Consult Note    Patient Name: Rodolfo Messina  MRN: [id number]  Admission Date: 6/29/2018  Hospital Length of Stay: 13 days  Code Status: Full Code   Attending Provider: Ramonita Rae MD  Consulting Provider: López Go MD  Primary Care Physician: Deric Claudio MD  Principal Problem:Symptomatic anemia    Consults  Subjective:     HPI:  Pt is a 69 yo M with h/o anemia documented in the [facility] since 2015 with decline in 1/2018, HFrEF, MVR 5/2018, CAD s/p CABG 5/2018, dysphagia developed after CABG, chronic lackey in place who presented to the hospital after his lackey was removed in Urology and the patient developed willa hematuria.  Upon admission the patient was seen to have supra therapeutic INR at 4.1 with hemoglobin of 6.6.  The patient was taken off of coumadin on 7/09/18.  GI consulted 7/09/18 for positive FOBT on 7/08/18 and decreasing hemoglobin. CT Renal Stone Study performed on 7/09/18 showed a new 5.2 cm heterogeneous left psoas collection concerning for blood, moderate bilateral pleural effusions, postsurgical change of mitral valve replacement, gastrojejunostomy tube, right renal cyst, bladder diverticulum, diverticulosis, and aortic ectasia and atherosclerosis.  VCE was performed on 7/11/18 and showed no obvious signs of bleeding.  Heme/Onc consulted for persistent and worsening anemia.  The patient states he has been anemic for some time and had been told in the past few years he could not donate blood due to anemia.  The patient states his mother was anemic towards the end of her life with an unknown diagnosis leading to anemia.  The patient had started his work up for anemia in January.  He underwent EGD and colonoscopy on 2/01/18 showing non bleeding erosive gastritis, internal hemorrhoids, and diverticulosis.  In addition the patient had an SPEP checked which was normal and normal B-12 and folate in the past.  The patient was seen  by Dr Borrego on 3/15/18 at which point a BM biopsy was considered; however, given his acute rise in hemoglobin it was deferred at that time.  Currently the patient states his hematuria has stopped.  He denies any melena, BRBPR, hemoptysis and hematemesis.  He denies abdominal pain or pain in the left leg with movement.  The patient denies fever, chills, night sweats, new lumps or bumps, easy bruising or bleeding.  He endorses some weight loss since his CABG.      Oncology Treatment Plan:   [No treatment plan]    Medications:  Continuous Infusions:  Scheduled Meds:   amiodarone  200 mg Per G Tube Daily    amLODIPine  10 mg Per G Tube Daily    aspirin  81 mg Oral Daily    atorvastatin  40 mg Per G Tube Daily    cefTRIAXone (ROCEPHIN) IVPB  2 g Intravenous Q12H    cetirizine  5 mg Per G Tube Daily    furosemide  40 mg Oral Daily    metoprolol succinate  12.5 mg Oral Daily    mirtazapine  30 mg Per G Tube QHS    pantoprazole  40 mg Per G Tube Daily    potassium chloride 10%  20 mEq Per G Tube BID    tamsulosin  0.4 mg Per G Tube Daily     PRN Meds:albuterol-ipratropium, dextrose 50%, dextrose 50%, dextrose 50%, dextrose 50%, glucagon (human recombinant), glucagon (human recombinant), glucose, glucose, glucose, glucose, guaifenesin 100 mg/5 ml, insulin aspart U-100, lidocaine HCL 20 mg/ml (2%), sodium chloride 0.9%     Review of patient's allergies indicates:   Allergen Reactions    Shellfish containing products     Augmentin [amoxicillin-pot clavulanate]      Patient felt that it raised BP and requested to have it added as intolerance. Tolerated unasyn and ampicillin.    Ciprofloxacin     Flagyl [metronidazole]     Lisinopril Other (See Comments)     cough    Mysoline [primidone]     Omnicef [cefdinir]         Past Medical History:   Diagnosis Date    ANNA (acute kidney injury) 2/22/2018    Anemia     Anxiety     Arthritis     BPH (benign prostatic hyperplasia)     CHF (congestive heart failure)  5/23/2018    Coarse tremors     Coronary artery disease of native artery of native heart with stable angina pectoris 5/21/2018    Diverticulosis     Encounter for blood transfusion     Endocarditis     Hypertension     Urinary retention      Past Surgical History:   Procedure Laterality Date    APPENDECTOMY      COLONOSCOPY      COLONOSCOPY N/A 2/1/2018    Procedure: COLONOSCOPY;  Surgeon: Richar Payan MD;  Location: [facility] (4TH FLR);  Service: Endoscopy;  Laterality: N/A;  PM prep    CREATION OF AORTOCORONARY ARTERY BYPASS N/A 5/25/2018    Procedure: AORTOCORONARY BYPASS-CABG;  Surgeon: Marvin Go MD;  Location: [facility] OR 2ND FLR;  Service: Cardiovascular;  Laterality: N/A;    EYE SURGERY Right     cataract extraction    FINGER SURGERY      FRACTURE SURGERY Right     index finger    MITRAL VALVE REPLACEMENT N/A 5/25/2018    Procedure: Mitral Valve Replacement;  Surgeon: Marvin Go MD;  Location: [facility] OR [address] [place] [address];  Service: Cardiovascular;  Laterality: N/A;    TONSILLECTOMY       Family History     Problem Relation (Age of Onset)    Heart disease Mother, Father    No Known Problems Sister, Brother    Stroke Mother        Social History Main Topics    Smoking status: Never Smoker    Smokeless tobacco: Never Used    Alcohol use No      Comment: none recently    Drug use: No    Sexual activity: Not Currently     Partners: Female       Review of Systems   Constitutional: Positive for unexpected weight change. Negative for chills, diaphoresis, fatigue and fever.   HENT: Positive for trouble swallowing. Negative for sore throat and voice change.    Eyes: Negative for photophobia and visual disturbance.   Respiratory: Negative for cough, chest tightness and shortness of breath.    Cardiovascular: Negative for chest pain, palpitations and leg swelling.   Gastrointestinal: Negative for abdominal pain, constipation, diarrhea, nausea and vomiting.   Endocrine: Negative for cold intolerance and  heat intolerance.   Genitourinary: Negative for difficulty urinating, dysuria and hematuria.   Musculoskeletal: Negative for arthralgias, back pain and myalgias.   Skin: Negative for color change and rash.   Neurological: Negative for dizziness, light-headedness, numbness and headaches.     Objective:     Vital Signs (Most Recent):  Temp: 97.2 °F (36.2 °C) (07/13/18 1109)  Pulse: 84 (07/13/18 1109)  Resp: 16 (07/13/18 1109)  BP: 125/65 (07/13/18 1109)  SpO2: (!) 94 % (07/13/18 1109) Vital Signs (24h Range):  Temp:  [96.1 °F (35.6 °C)-98.7 °F (37.1 °C)] 97.2 °F (36.2 °C)  Pulse:  [80-87] 84  Resp:  [16] 16  SpO2:  [94 %-99 %] 94 %  BP: (106-128)/(59-67) 125/65     Weight: 71.3 kg (157 lb 3 oz)  Body mass index is 22.55 kg/m².  Body surface area is 1.88 meters squared.    No intake or output data in the 24 hours ending 07/13/18 1558    Physical Exam   Constitutional: He is oriented to person, place, and time. He appears well-developed and well-nourished. No distress.   HENT:   Head: Normocephalic and atraumatic.   Mouth/Throat: Oropharynx is clear and moist. No oropharyngeal exudate.   Eyes: Right eye exhibits no discharge. Left eye exhibits no discharge. No scleral icterus.   Cardiovascular: Normal rate, regular rhythm, normal heart sounds and intact distal pulses.  Exam reveals no gallop and no friction rub.    No murmur heard.  Pulmonary/Chest: Effort normal and breath sounds normal. No respiratory distress. He has no wheezes. He has no rales. He exhibits no tenderness.   Abdominal: Soft. Bowel sounds are normal. He exhibits no distension and no mass. There is no tenderness. There is no rebound.   Musculoskeletal: Normal range of motion. He exhibits no edema or tenderness.   Lymphadenopathy:        Head (right side): No submental and no submandibular adenopathy present.        Head (left side): No submental and no submandibular adenopathy present.     He has no cervical adenopathy.     He has no axillary  adenopathy.        Right: No supraclavicular adenopathy present.        Left: No supraclavicular adenopathy present.   Neurological: He is alert and oriented to person, place, and time. No cranial nerve deficit. Coordination normal.   Skin: Skin is warm and dry. No rash noted. He is not diaphoretic. No erythema.   Psychiatric: He has a normal mood and affect. His behavior is normal.       Significant Labs:   Recent Results (from the past 24 hour(s))   POCT glucose    Collection Time: 07/12/18  5:19 PM   Result Value Ref Range    POCT Glucose 129 (H) 70 - 110 mg/dL   CBC auto differential    Collection Time: 07/13/18  3:54 AM   Result Value Ref Range    WBC 4.87 3.90 - 12.70 K/uL    RBC 2.42 (L) 4.60 - 6.20 M/uL    Hemoglobin 7.4 (L) 14.0 - 18.0 g/dL    Hematocrit 23.6 (L) 40.0 - 54.0 %    MCV 98 82 - 98 fL    MCH 30.6 27.0 - 31.0 pg    MCHC 31.4 (L) 32.0 - 36.0 g/dL    RDW 17.1 (H) 11.5 - 14.5 %    Platelets 142 (L) 150 - 350 K/uL    MPV 11.2 9.2 - 12.9 fL    Immature Granulocytes 0.2 0.0 - 0.5 %    Gran # (ANC) 3.0 1.8 - 7.7 K/uL    Immature Grans (Abs) 0.01 0.00 - 0.04 K/uL    Lymph # 0.8 (L) 1.0 - 4.8 K/uL    Mono # 0.5 0.3 - 1.0 K/uL    Eos # 0.5 0.0 - 0.5 K/uL    Baso # 0.03 0.00 - 0.20 K/uL    nRBC 0 0 /100 WBC    Gran% 62.3 38.0 - 73.0 %    Lymph% 16.8 (L) 18.0 - 48.0 %    Mono% 9.4 4.0 - 15.0 %    Eosinophil% 10.7 (H) 0.0 - 8.0 %    Basophil% 0.6 0.0 - 1.9 %    Differential Method Automated    Magnesium    Collection Time: 07/13/18  3:54 AM   Result Value Ref Range    Magnesium 1.8 1.6 - 2.6 mg/dL   Phosphorus    Collection Time: 07/13/18  3:54 AM   Result Value Ref Range    Phosphorus 3.0 2.7 - 4.5 mg/dL   Basic metabolic panel    Collection Time: 07/13/18  3:54 AM   Result Value Ref Range    Sodium 140 136 - 145 mmol/L    Potassium 4.7 3.5 - 5.1 mmol/L    Chloride 111 (H) 95 - 110 mmol/L    CO2 23 23 - 29 mmol/L    Glucose 116 (H) 70 - 110 mg/dL    BUN, Bld 21 8 - 23 mg/dL    Creatinine 1.3 0.5 - 1.4 mg/dL     Calcium 8.6 (L) 8.7 - 10.5 mg/dL    Anion Gap 6 (L) 8 - 16 mmol/L    eGFR if African American >60.0 >60 mL/min/1.73 m^2    eGFR if non  56.1 (A) >60 mL/min/1.73 m^2     Haptoglobin normal 180 7/10/18  LDH normal 180 7/10/18    SPEP normal 2/23/18    Diagnostic Results:  7/09/18 CT Renal Stone Study    No abnormality identified to account for this patient's hematuria on this noncontrast examination.    New 5.2 cm heterogeneous left psoas collection, likely correlating with an intramuscular hematoma given this patient's history of decreasing hemoglobin.    Moderate bilateral pleural effusions with associated compressive atelectatic change and diffuse ground-glass opacification which may relate to infectious or noninfectious inflammatory change versus edema.  These findings have improved since CT 06/15/2018.    Hyperdense material fills the gallbladder, most commonly seen in the setting of vicarious excretion of contrast.  Clinical correlation for recent contrast administration is advised.    Additional findings include:    -Postsurgical change of mitral valve replacement    -Gastrojejunostomy tube    -Right renal cyst    -Bladder diverticulum    -Diverticulosis    -Aortic ectasia and atherosclerosis    7/13/18 CT Abdomen and Pelvis    Slight interval decrease in size of a left psoas intramuscular collection, most compatible with hematoma.    Stable moderate bilateral pleural effusions with associated atelectatic change.    Multiple additional stable findings include mitral valve prosthesis, gastrojejunostomy tube, right renal cyst, bladder diverticulum, diverticulosis, aortic ectasia and atherosclerosis.    Assessment/Plan:     Anemia    The patient has a normocytic anemia present for some years as the patient states he has not been able to donate blood for the last couple of years.  -The patient has been assessed and negative for MM with negative SPEP, hemolytic anemia with negative normal LDH and  haptoglobin, nutritional deficiencies with normal B12 and folate, iron deficiency with elevated ferritin of 836 on 3/25/18  -Differential for the patient's anemia include anemia of chronic disease, MDS, and blood loss  -BM biopsy performed today to assess for MDS  -Will f/u with the patient upon d/c and repeat studies assessing for anemia of chronic disease  -The patient initially had blood loss from hematuria which has now resolved.   -Fluid collection on CT renal stone study was concerning for hematoma which appears to be resolving on repeat CT abdomen and pelvis completed today.  -Will f/u the results of the BM biopsy  -Will have the patient follow up in our clinic to discuss results of the BM biopsy and NGS.          -Discussed with Dr Diaz.    Thank you for your consult. I will follow-up with patient. Please contact us if you have any additional questions.    López Go MD  Hematology/Oncology  [facility]    STAFF: Above findings confirmed. See my separate note of a short while ago.  Ej Diza MD

## 2018-07-13 NOTE — PROCEDURES
"Rodolfo Messina is a 68 y.o. male patient.    Temp: 97.2 °F (36.2 °C) (07/13/18 1109)  Pulse: 84 (07/13/18 1109)  Resp: 16 (07/13/18 1109)  BP: 125/65 (07/13/18 1109)  SpO2: (!) 94 % (07/13/18 1109)  Weight: 71.3 kg (157 lb 3 oz) (07/12/18 1400)  Height: 5' 10" (177.8 cm) (07/12/18 1400)       Bone marrow  Date/Time: 7/13/2018 3:00 PM  Performed by: LÓPEZ BINGHAM.  Authorized by: LÓPEZ BINGHAM.     Consent Done?:  Yes (Written)  Assistants?: No    Anesthesia:  Local infiltration  Local anesthetic:  Lidocaine 2% without epinephrine  Aspiration?: Yes    Biopsy?: Yes    Number of Specimens::  1   Patient tolerated the procedure well with no immediate complications.   Post-operative instructions were provided for the patient.    PROCEDURE NOTE:  Date: 07/13/2018  Bone Marrow Aspiration and Biopsy  Indication: Anemia  Consent: Informed consent was obtained from patient.  Timeout: Done and documented.  Site: Left posterior illiac crest.  Position: Right Lateral Decubitus  Prep: Betadine.  Needle used: 11 gauge Jamshidi needle.  Anesthetic: 2% lidocaine 3 cc.  Biopsy: The biopsy needle was introduced into the marrow cavity and an aspirate was obtained without complications.  Obtained flow and cytogenetics Core biopsy obtained and sent for routine histologic examination.  Studies were also sent for NGS and MDS FISH.  Complications: None.  Estimated blood loss 3 cc  Disposition: Patient needs to be be supine for 30-45 post procedure in order to put pressure on the biopsy site.          López Bingham MD PGY-VI  Hematology and Oncology  Pager:[phone]    STAFF: No complications.  MD López Servin  7/13/2018  "

## 2018-07-13 NOTE — HPI
Pt is a 67 yo M with h/o anemia documented in the [facility] since 2015 with decline in 1/2018, HFrEF, MVR 5/2018, CAD s/p CABG 5/2018, dysphagia developed after CABG, chronic lackey in place who presented to the hospital after his lackey was removed in Urology and the patient developed willa hematuria.  Upon admission the patient was seen to have supra therapeutic INR at 4.1 with hemoglobin of 6.6.  The patient was taken off of coumadin on 7/09/18.  GI consulted 7/09/18 for positive FOBT on 7/08/18 and decreasing hemoglobin. CT Renal Stone Study performed on 7/09/18 showed a new 5.2 cm heterogeneous left psoas collection concerning for blood, moderate bilateral pleural effusions, postsurgical change of mitral valve replacement, gastrojejunostomy tube, right renal cyst, bladder diverticulum, diverticulosis, and aortic ectasia and atherosclerosis.  VCE was performed on 7/11/18 and showed no obvious signs of bleeding.  Heme/Onc consulted for persistent and worsening anemia.  The patient states he has been anemic for some time and had been told in the past few years he could not donate blood due to anemia.  The patient states his mother was anemic towards the end of her life with an unknown diagnosis leading to anemia.  The patient had started his work up for anemia in January.  He underwent EGD and colonoscopy on 2/01/18 showing non bleeding erosive gastritis, internal hemorrhoids, and diverticulosis.  In addition the patient had an SPEP checked which was normal and normal B-12 and folate in the past.  The patient was seen by Dr Borrego on 3/15/18 at which point a BM biopsy was considered; however, given his acute rise in hemoglobin it was deferred at that time.  Currently the patient states his hematuria has stopped.  He denies any melena, BRBPR, hemoptysis and hematemesis.  He denies abdominal pain or pain in the left leg with movement.  The patient denies fever, chills, night sweats, new lumps or bumps, easy  bruising or bleeding.  He endorses some weight loss since his CABG.

## 2018-07-13 NOTE — HOSPITAL COURSE
* Symptomatic anemia     - H&H stable  - no reports of bleeding but FOBT positive  - patient declined cystoscopy that was planned due to resolution of hematura; can follow up outpatient   - case discussed with Dr. Go regarding anticoagulation and because the patient has had several bleeding complications and is now two months out from his surgery, the risks of continuing with warfarin outweighs the benefits at this time; can continue EC ASA 81 mg daily; heparin and warfarin discontinued; patient's wife aware and will update patient  - continue pantoprazole   - hemodynamically stable   - VCE without evidence of bleeding - GI has no further recommendations  - left psoas fluid collection seen on 7/9 CT, could be source of anemia. Repeated CT and has improved however.  - Heme did BMB 7/13, follow in clinic in 1 weeks for results                Hematuria     - resolved. Repeat UA negative  - follow up with urology outpatient for cystoscopy                       H/O atrial flutter     - occurred during hospitalization for CABG and MVR  - no DCCV due to atrial thrombus  - converted with meds; continue amiodarone, BB  - HRs well controlled              CKD (chronic kidney disease) stage 3, GFR 30-59 ml/min     - creatinine improved and stable                                           Dysphagia with PEG     - continue TFs  - MBSS done. To remain NPO per SLP   - changed from continuous to bolus feeds - isosource 1.5 dannie (5 cans/day). Free water flushes 500 cc q4                S/P MVR (mitral valve replacement)     - warfarin dc'ed per Dr Go due to bleeding/anemia  - asa 81 mg daily          S/P CABG x 1     Resumed home ASA at 81 mg, statin             Chronic systolic congestive heart failure     - no acute issues  - continue medical management                         Subacute bacterial endocarditis     - remains afebrile and no leukocytosis  - repeated BCx's prior to stopping abx, negative  - completed ampicillin,  rocephin - stop date 7/13  - ID f/u upcoming

## 2018-07-14 ENCOUNTER — NURSE TRIAGE (OUTPATIENT)
Dept: ADMINISTRATIVE | Facility: CLINIC | Age: 68
End: 2018-07-14

## 2018-07-14 NOTE — TELEPHONE ENCOUNTER
Reason for Disposition   Caller has URGENT medication question about med that PCP prescribed and triager unable to answer question    Protocols used: ST MEDICATION QUESTION CALL-A-AH

## 2018-07-14 NOTE — TELEPHONE ENCOUNTER
Caregiver called to report the following:     -how much water   -patient has hx of   -continues pump 5 boxes of Jevity 355 dannie   -gravity feeding   -Dr. Giles notified    -no new orders noted     Education completed per [facility] Advice including follow up with provider. Caregiver verbalized understanding.

## 2018-07-16 ENCOUNTER — PATIENT OUTREACH (OUTPATIENT)
Dept: ADMINISTRATIVE | Facility: CLINIC | Age: 68
End: 2018-07-16

## 2018-07-16 ENCOUNTER — TELEPHONE (OUTPATIENT)
Dept: FAMILY MEDICINE | Facility: CLINIC | Age: 68
End: 2018-07-16

## 2018-07-16 LAB
BACTERIA BLD CULT: NORMAL
BACTERIA BLD CULT: NORMAL
BONE MARROW IRON STAIN COMMENT: NORMAL
BONE MARROW WRIGHT STAIN COMMENT: NORMAL
CLINICAL CYTOGENETICIST REVIEW: NORMAL
FMDS SPECIMEN: NORMAL
MDS FISH ADDITIONAL INFORMATION: NORMAL
MDS FISH DISCLAIMER: NORMAL
MDS FISH REASON FOR REFERRAL (BM): NORMAL
MDS FISH RELEASED BY: NORMAL
MDS FISH RESULT (BM): NORMAL
MDS FISH RESULT SUMMARY: NORMAL
MDS FISH RESULT TABLE: NORMAL
REF LAB TEST METHOD: NORMAL
SPECIMEN SOURCE: NORMAL

## 2018-07-16 NOTE — TELEPHONE ENCOUNTER
----- Message from Mary Calderon RN sent at 7/16/2018 10:20 AM CDT -----  I spoke with above patient's wife for a post d\c follow up. They were not d\c with any orders on flushes and tube feeds as far as how much and how often. Please call her to advise on what they should be doing.  Thanks,  Mary Calderon RN

## 2018-07-16 NOTE — PATIENT INSTRUCTIONS

## 2018-07-16 NOTE — PLAN OF CARE
Plan is to discharge home with [facility].    Appointment made with Dr. Claudio for 7/17 at 10:40 AM.       07/16/18 0748   Final Note   Assessment Type Final Discharge Note   Discharge Disposition Home-Health   Hospital Follow Up  Appt(s) scheduled? Yes   Discharge plans and expectations educations in teach back method with documentation complete? Yes   Right Care Referral Info   Post Acute Recommendation Home-care   Referral Type Home Health   Facility Name [facility]

## 2018-07-16 NOTE — PROGRESS NOTES
Wife stating that they were sent home without tube feeding orders. They were changed from pre hospital since no longer has pump since PICC line removed. She doesn't know how often\how much to do flushes or feedings. High priority message sent to Dr. Butch pettit\erika provider along with Hospitalist RAKESH Gardner and his PCP to clarify.

## 2018-07-16 NOTE — TELEPHONE ENCOUNTER
Mary,  You can see Dr Palma message below.  We have not seen Mr Messina recently and Dr Palma was not involved in   This placement.   He does have a follow up appointment here with Dr Palma tomorrow.  I do see That you also sent messages to the hospitalist and Dr Jenn Andre hoping they will be able to help you with this.  Asiya

## 2018-07-16 NOTE — TELEPHONE ENCOUNTER
I do know.  I was not involved with the peg tube placement or recommendation-I do not know even what supplement was ordered

## 2018-07-17 ENCOUNTER — OFFICE VISIT (OUTPATIENT)
Dept: FAMILY MEDICINE | Facility: CLINIC | Age: 68
End: 2018-07-17
Payer: MEDICARE

## 2018-07-17 VITALS
WEIGHT: 144.81 LBS | TEMPERATURE: 98 F | HEIGHT: 70 IN | DIASTOLIC BLOOD PRESSURE: 68 MMHG | SYSTOLIC BLOOD PRESSURE: 108 MMHG | BODY MASS INDEX: 20.73 KG/M2 | HEART RATE: 93 BPM | OXYGEN SATURATION: 98 %

## 2018-07-17 DIAGNOSIS — I27.22 PULMONARY HYPERTENSION DUE TO MITRAL VALVE DISEASE: ICD-10-CM

## 2018-07-17 DIAGNOSIS — I05.9 ENDOCARDITIS OF MITRAL VALVE: ICD-10-CM

## 2018-07-17 DIAGNOSIS — D64.9 ANEMIA, UNSPECIFIED TYPE: Primary | ICD-10-CM

## 2018-07-17 DIAGNOSIS — I05.9 PULMONARY HYPERTENSION DUE TO MITRAL VALVE DISEASE: ICD-10-CM

## 2018-07-17 DIAGNOSIS — R13.10 DYSPHAGIA, UNSPECIFIED TYPE: ICD-10-CM

## 2018-07-17 PROCEDURE — 3078F DIAST BP <80 MM HG: CPT | Mod: CPTII,S$GLB,, | Performed by: FAMILY MEDICINE

## 2018-07-17 PROCEDURE — 99213 OFFICE O/P EST LOW 20 MIN: CPT | Mod: S$GLB,,, | Performed by: FAMILY MEDICINE

## 2018-07-17 PROCEDURE — 3074F SYST BP LT 130 MM HG: CPT | Mod: CPTII,S$GLB,, | Performed by: FAMILY MEDICINE

## 2018-07-18 ENCOUNTER — OFFICE VISIT (OUTPATIENT)
Dept: INFECTIOUS DISEASES | Facility: CLINIC | Age: 68
End: 2018-07-18
Payer: MEDICARE

## 2018-07-18 ENCOUNTER — CLINICAL SUPPORT (OUTPATIENT)
Dept: CARDIOLOGY | Facility: CLINIC | Age: 68
End: 2018-07-18
Attending: THORACIC SURGERY (CARDIOTHORACIC VASCULAR SURGERY)
Payer: MEDICARE

## 2018-07-18 VITALS
BODY MASS INDEX: 20.67 KG/M2 | SYSTOLIC BLOOD PRESSURE: 111 MMHG | HEIGHT: 70 IN | TEMPERATURE: 98 F | WEIGHT: 144.38 LBS | DIASTOLIC BLOOD PRESSURE: 57 MMHG | HEART RATE: 80 BPM

## 2018-07-18 DIAGNOSIS — I33.0 SUBACUTE BACTERIAL ENDOCARDITIS: Primary | ICD-10-CM

## 2018-07-18 DIAGNOSIS — Z95.2 S/P MVR (MITRAL VALVE REPLACEMENT): ICD-10-CM

## 2018-07-18 DIAGNOSIS — Z95.2 MITRAL VALVE REPLACED: ICD-10-CM

## 2018-07-18 LAB
AORTIC VALVE REGURGITATION: NORMAL
BASOPHILS # BLD AUTO: 30 CELLS/UL (ref 0–200)
BASOPHILS NFR BLD AUTO: 0.7 %
BODY SITE - BONE MARROW: NORMAL
CLINICAL DIAGNOSIS - BONE MARROW: NORMAL
EOSINOPHIL # BLD AUTO: 318 CELLS/UL (ref 15–500)
EOSINOPHIL NFR BLD AUTO: 7.4 %
ERYTHROCYTE [DISTWIDTH] IN BLOOD BY AUTOMATED COUNT: 15.8 % (ref 11–15)
ESTIMATED PA SYSTOLIC PRESSURE: 30.04
FLOW CYTOMETRY ANTIBODIES ANALYZED - BONE MARROW: NORMAL
FLOW CYTOMETRY COMMENT - BONE MARROW: NORMAL
FLOW CYTOMETRY INTERPRETATION - BONE MARROW: NORMAL
HCT VFR BLD AUTO: 25 % (ref 38.5–50)
HGB BLD-MCNC: 8.2 G/DL (ref 13.2–17.1)
LYMPHOCYTES # BLD AUTO: 800 CELLS/UL (ref 850–3900)
LYMPHOCYTES NFR BLD AUTO: 18.6 %
MCH RBC QN AUTO: 30.9 PG (ref 27–33)
MCHC RBC AUTO-ENTMCNC: 32.8 G/DL (ref 32–36)
MCV RBC AUTO: 94.3 FL (ref 80–100)
MONOCYTES # BLD AUTO: 340 CELLS/UL (ref 200–950)
MONOCYTES NFR BLD AUTO: 7.9 %
NEUTROPHILS # BLD AUTO: 2812 CELLS/UL (ref 1500–7800)
NEUTROPHILS NFR BLD AUTO: 65.4 %
PLATELET # BLD AUTO: 160 THOUSAND/UL (ref 140–400)
PMV BLD REES-ECKER: 11.6 FL (ref 7.5–12.5)
RBC # BLD AUTO: 2.65 MILLION/UL (ref 4.2–5.8)
RETIRED EF AND QEF - SEE NOTES: 50 (ref 55–65)
TRICUSPID VALVE REGURGITATION: NORMAL
WBC # BLD AUTO: 4.3 THOUSAND/UL (ref 3.8–10.8)

## 2018-07-18 PROCEDURE — 99999 PR PBB SHADOW E&M-EST. PATIENT-LVL III: CPT | Mod: PBBFAC,,, | Performed by: INTERNAL MEDICINE

## 2018-07-18 PROCEDURE — 93306 TTE W/DOPPLER COMPLETE: CPT | Mod: S$GLB,,, | Performed by: INTERNAL MEDICINE

## 2018-07-18 PROCEDURE — 99214 OFFICE O/P EST MOD 30 MIN: CPT | Mod: S$GLB,,, | Performed by: INTERNAL MEDICINE

## 2018-07-18 NOTE — PROGRESS NOTES
Subjective:      Patient ID: Rodolfo Messina is a 68 y.o. male.    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  He completed a 6 week course of antibiotics and was then taken to the operating room on may 25, 2018.  He underwent a mitral valve replacement.  Intra-operatively, there were concerns for persistent infection so antibiotics (ampicillin and ceftriaxone) were re-started.    Review of Systems   Constitution: Positive for weight loss. Negative for chills, decreased appetite, fever, weakness, malaise/fatigue and weight gain.   HENT: Positive for hearing loss and tinnitus. Negative for congestion, ear pain, hoarse voice and sore throat.    Eyes: Negative for blurred vision, redness and visual disturbance.   Cardiovascular: Negative for chest pain, leg swelling and palpitations.   Respiratory: Negative for cough, hemoptysis, shortness of breath and sputum production.    Hematologic/Lymphatic: Negative for adenopathy. Does not bruise/bleed easily.   Skin: Positive for itching. Negative for dry skin, rash and suspicious lesions.   Musculoskeletal: Negative for back pain, joint pain, myalgias and neck pain.   Gastrointestinal: Negative for abdominal pain, constipation, diarrhea, heartburn, nausea and vomiting.   Genitourinary: Negative for dysuria, flank pain, frequency, hematuria, hesitancy and urgency.   Neurological: Negative for dizziness, headaches, numbness and paresthesias.   Psychiatric/Behavioral: Negative for depression and memory loss. The patient has insomnia. The patient is not nervous/anxious.      Objective:   Physical Exam   Constitutional: He is oriented to person, place, and time. He appears well-developed and well-nourished. No distress.   HENT:   Head: Normocephalic and atraumatic.   Right Ear: External ear normal.   Left Ear: External ear normal.   Nose: Nose normal.   Mouth/Throat:  Oropharynx is clear and moist. No oropharyngeal exudate.   Eyes: Conjunctivae and EOM are normal. Pupils are equal, round, and reactive to light. Right eye exhibits no discharge. Left eye exhibits no discharge. No scleral icterus.   Neck: Normal range of motion. Neck supple. No JVD present. No tracheal deviation present. No thyromegaly present.   Cardiovascular: Normal rate, regular rhythm and intact distal pulses.  Exam reveals no gallop and no friction rub.    No murmur heard.  Pulmonary/Chest: Effort normal and breath sounds normal. No stridor. No respiratory distress. He has no wheezes. He has no rales. He exhibits no tenderness.   Abdominal: Soft. Bowel sounds are normal. He exhibits no distension and no mass. There is no tenderness. There is no rebound and no guarding.   Musculoskeletal: Normal range of motion. He exhibits no edema or tenderness.   Lymphadenopathy:     He has no cervical adenopathy.   Neurological: He is alert and oriented to person, place, and time. He has normal reflexes. He displays normal reflexes. No cranial nerve deficit. He exhibits normal muscle tone. Coordination normal.   Skin: Skin is warm. No rash noted. He is not diaphoretic. No erythema. No pallor.   Psychiatric: He has a normal mood and affect. His behavior is normal. Judgment and thought content normal.   Nursing note and vitals reviewed.    Assessment:       1. Subacute bacterial endocarditis    2. Mitral valve replaced        67 y/o with history of E faecalis endocarditis of the mitral valve.  He is s/p appropriate IV antibiotic therapy and mitral valve replacement.  He is here today for follow up.  No further antibiotic therapy indicated.  Transthoracic echocardiogram ordered.  He is to follow up as needed.  Plan:       Subacute bacterial endocarditis  -     Echo 2d complete; Future    Mitral valve replaced  -     Echo 2d complete; Future

## 2018-07-19 NOTE — [employer]OVATION PATIENT INSTRUCTIONS
Discharge Summary/Instructions for after Video Capsule Endoscopy  Patient Name: Rodolfo Messina  Patient MRN: [id number]  Patient YOB: 1950  Wednesday, July 11, 2018  Brayden Portillo MD  This is a 68 year old male.  1.  Do Not eat or drink anything for 1 hour.  Try sips of water first.  If   tolerated, resume your regular diet or one recommended by your physician.  2.  Do not drive, operate machinery, make critical decisions, or do   activities that require coordination or balance for 24 hours.  3.   You may experience a sore throat for 24 to 48 hours.  You may use   throat lozenges or gargle with warm salt water to relieve the discomfort.  4.  Because air was put into your stomach during the procedure, you may   experience some belching.  5.  Do not use any medication containing aspirin for 10 days.  6.  Go directly to the emergency room if you notice any of the following:   Chills and/or fever over 101 F   Persistent vomiting or vomiting with blood/nasal regurgitation   Severe abdominal pain, other than gas cramps   Severe chest pain   Black, tarry stools     Your doctor recommends these additional instructions:  Return to your GI clinic as needed.  If you have any questions or problems, please call your physician.  EMERGENCY PHONE NUMBER: [phone]  LAB RESULTS: [phone]  Brayden Portillo MD  7/19/2018 2:41:58 PM  This report has been verified and signed electronically.  [employer]

## 2018-07-20 LAB
DNA/RNA EXTRACT AND HOLD RESULT: NORMAL
DNA/RNA EXTRACTION: NORMAL
EXHR SPECIMEN TYPE: NORMAL

## 2018-07-21 NOTE — PROGRESS NOTES
Patient ID: Rodolfo Messina is a 68 y.o. male.    Chief Complaint: Hospital Follow Up    HPI       Rodolfo Messina is a 68 y.o. male following up on hospitalization for endocarditis status post surgery and long term use of antibiotics.  Continues to progress.  Anemia is stable.  Strength is increasing.  Continues to use parental feeding.  Peg tube occasionally gets clogged with some medicines including Flomax.  He is able to swallow some pudding and other items.  Looks to have a swallow study in the future to see if diet can be progressed.      Review of Symptoms    Constitutional  Neg activity change, No chills/ fever   Resp  Neg hemoptysis, stridor, choking  CVS  Neg chest pain, palpitations    Physical Exam    Constitutional:   Oriented to person, place, and time.appears well-developed and well-nourished.   No distress.     HENT:   Head: Normocephalic and atraumatic.     Right Ear: Tympanic membrane, external ear and ear canal normal     Left Ear: Tympanic membrane, external ear and ear canal normal    Nose: External Normal. Normal turbinates, No rhinorrhea     Mouth/Throat: Uvula is midline, oropharynx is clear and moist and mucous membranes are normal.     Neck: supple no anterior cervical adenopathy    Carotid artery:  Bruit    NEG []   POS[]    Eyes:   Conjunctivae are normal. Right eye exhibits no discharge. Left eye exhibits no discharge. No scleral icterus. No periorbital edema       Cardiovascular:  Regular rate, Regular rhythm     No extrasystole  Normal S1-S2    Pulmonary/Chest:   Normal effort and breath sounds.  No wheezing, rhonchi or rales.      Musculoskeletal:  No edema. No obvious deformity No wasting     Neurological:   Alert and oriented to person, place, and time. Coordination normal.     Skin:   Skin is warm and dry.   No diaphoresis.   No rash noted.    Psychiatric: Normal mood and affect. Behavior is normal. Judgment and thought content normal.       Assessment / Plan:      ICD-10-CM  ICD-9-CM   1. Anemia, unspecified type D64.9 285.9   2. Endocarditis of mitral valve I05.8 394.9   3. Pulmonary hypertension due to mitral valve disease I27.22 416.8    I05.9 394.9   4. Dysphagia, unspecified type R13.10 787.20     Anemia, unspecified type  -     CBC auto differential; Future    Endocarditis of mitral valve    Pulmonary hypertension due to mitral valve disease    Dysphagia, unspecified type          Discussed or crushing Flomax and putting in pudding.  Continue to follow anemia.  Continue to follow blood pressure  Weight is stable-continue on Remeron at this time.  All

## 2018-07-23 ENCOUNTER — HOSPITAL ENCOUNTER (OUTPATIENT)
Dept: CARDIOLOGY | Facility: CLINIC | Age: 68
Discharge: HOME | End: 2018-07-23
Attending: THORACIC SURGERY (CARDIOTHORACIC VASCULAR SURGERY)
Payer: MEDICARE

## 2018-07-23 ENCOUNTER — DOCUMENTATION ONLY (OUTPATIENT)
Dept: CARDIOTHORACIC SURGERY | Facility: CLINIC | Age: 68
End: 2018-07-23

## 2018-07-23 ENCOUNTER — TELEPHONE (OUTPATIENT)
Dept: NEUROLOGY | Facility: CLINIC | Age: 68
End: 2018-07-23

## 2018-07-23 ENCOUNTER — TELEPHONE (OUTPATIENT)
Dept: FAMILY MEDICINE | Facility: CLINIC | Age: 68
End: 2018-07-23

## 2018-07-23 ENCOUNTER — HOSPITAL ENCOUNTER (OUTPATIENT)
Dept: RADIOLOGY | Facility: HOSPITAL | Age: 68
Discharge: HOME | End: 2018-07-23
Attending: THORACIC SURGERY (CARDIOTHORACIC VASCULAR SURGERY)
Payer: MEDICARE

## 2018-07-23 ENCOUNTER — OFFICE VISIT (OUTPATIENT)
Dept: CARDIOTHORACIC SURGERY | Facility: CLINIC | Age: 68
End: 2018-07-23
Payer: MEDICARE

## 2018-07-23 VITALS
SYSTOLIC BLOOD PRESSURE: 108 MMHG | BODY MASS INDEX: 19.72 KG/M2 | WEIGHT: 140.88 LBS | HEART RATE: 80 BPM | DIASTOLIC BLOOD PRESSURE: 63 MMHG | HEIGHT: 71 IN | TEMPERATURE: 97 F

## 2018-07-23 DIAGNOSIS — Z95.2 S/P MVR (MITRAL VALVE REPLACEMENT): Primary | ICD-10-CM

## 2018-07-23 DIAGNOSIS — I27.22 PULMONARY HYPERTENSION DUE TO MITRAL VALVE DISEASE: ICD-10-CM

## 2018-07-23 DIAGNOSIS — I08.0 MITRAL AND AORTIC VALVE REGURGITATION: ICD-10-CM

## 2018-07-23 DIAGNOSIS — Z95.2 S/P MVR (MITRAL VALVE REPLACEMENT): ICD-10-CM

## 2018-07-23 DIAGNOSIS — Z95.1 S/P CABG X 1: ICD-10-CM

## 2018-07-23 DIAGNOSIS — Z01.810 PREOP CARDIOVASCULAR EXAM: ICD-10-CM

## 2018-07-23 DIAGNOSIS — I05.9 PULMONARY HYPERTENSION DUE TO MITRAL VALVE DISEASE: ICD-10-CM

## 2018-07-23 DIAGNOSIS — I05.9 ENDOCARDITIS OF MITRAL VALVE: ICD-10-CM

## 2018-07-23 DIAGNOSIS — I10 ESSENTIAL HYPERTENSION: ICD-10-CM

## 2018-07-23 LAB
CHROM BANDING METHOD: NORMAL
CHROMOSOME ANALYSIS BM ADDITIONAL INFORMATION: NORMAL
CHROMOSOME ANALYSIS BM RELEASED BY: NORMAL
CHROMOSOME ANALYSIS BM RESULT SUMMARY: NORMAL
CLINICAL CYTOGENETICIST REVIEW: NORMAL
KARYOTYP MAR: NORMAL
REASON FOR REFERRAL (NARRATIVE): NORMAL
REF LAB TEST METHOD: NORMAL
SPECIMEN SOURCE: NORMAL
SPECIMEN: NORMAL

## 2018-07-23 PROCEDURE — 71046 X-RAY EXAM CHEST 2 VIEWS: CPT | Mod: 26,,, | Performed by: RADIOLOGY

## 2018-07-23 PROCEDURE — 99999 PR PBB SHADOW E&M-EST. PATIENT-LVL III: CPT | Mod: PBBFAC,,, | Performed by: THORACIC SURGERY (CARDIOTHORACIC VASCULAR SURGERY)

## 2018-07-23 PROCEDURE — 99024 POSTOP FOLLOW-UP VISIT: CPT | Mod: S$GLB,,, | Performed by: THORACIC SURGERY (CARDIOTHORACIC VASCULAR SURGERY)

## 2018-07-23 PROCEDURE — 71046 X-RAY EXAM CHEST 2 VIEWS: CPT | Mod: TC,FY

## 2018-07-23 PROCEDURE — 93000 ELECTROCARDIOGRAM COMPLETE: CPT | Mod: S$GLB,,, | Performed by: INTERNAL MEDICINE

## 2018-07-23 NOTE — PROGRESS NOTES
Patient seen and examined. Patient is progressively increasing activity. No significant complaints.     Sternum: stable, incision CDI  Chest xray: Acceptable post op chest  EKG: NSR  ECHO CONCLUSIONS     1 - Low normal to mildly depressed left ventricular systolic function (EF 50-55%).     2 - Low normal right ventricular systolic function .     3 - The estimated PA systolic pressure is 30 mmHg.     4 - Mild aortic regurgitation.     5 - Mitral valve prosthesis. Pk/mn gradient = 18/7 mmHg that appears unchanged from the prior study, No obvious MR noted.     6 - Mild to moderate tricuspid regurgitation.     7 - Biatrial enlargement.     8 - No wall motion abnormalities.      Assessment:  S/P MVR, CABG x1 LIMA-LAD     Plan:  Can begin driving when feeling stable   Can begin cardiac rehab   We will refer to cardiology and PCP to assume care   D/c amiodarone    No scheduled appointment, RTC prn

## 2018-07-23 NOTE — PROGRESS NOTES
Phase II Cardiac Rehab orders submitted to [facility]. Patient to f/u with Dr. Lockett on July 30, 2018.

## 2018-07-23 NOTE — TELEPHONE ENCOUNTER
----- Message from Shaquille Sosa sent at 7/23/2018  3:50 PM CDT -----  Contact: Lupe ( spouse ) @ [phone]  Caller is calling to schedule a f/u appt, caller notice more shaking following a surgery, pls call

## 2018-07-23 NOTE — TELEPHONE ENCOUNTER
----- Message from Deric Shepherd MD sent at 7/19/2018  6:05 PM CDT -----  Cbc - Results are stable per dr shepherd     I called pt wife no answer - I left message with details and advised to rtn call with any questions  I called and advised the pt of results  H/H - 8.2/25

## 2018-07-25 ENCOUNTER — TELEPHONE (OUTPATIENT)
Dept: FAMILY MEDICINE | Facility: CLINIC | Age: 68
End: 2018-07-25

## 2018-07-25 ENCOUNTER — TELEPHONE (OUTPATIENT)
Dept: HEMATOLOGY/ONCOLOGY | Facility: HOSPITAL | Age: 68
End: 2018-07-25

## 2018-07-25 DIAGNOSIS — R13.10 DYSPHAGIA, UNSPECIFIED TYPE: Primary | ICD-10-CM

## 2018-07-25 NOTE — TELEPHONE ENCOUNTER
I called the patient and let him know that lymphoma was found in his BM biopsy.  I informed him that we would be seeing him in clinic on 8/02/18 to further discuss additional diagnostic studies needed.     López Go MD PGY-VI  Hematology and Oncology  Pager:[phone]

## 2018-07-25 NOTE — TELEPHONE ENCOUNTER
----- Message from Cassia Sahu sent at 7/25/2018  1:38 PM CDT -----  Contact: [phone] /wife/Lupe  Patient's wife is requesting a call back. She has a lot of questions about different medications.  Stopped up PEG tube. Can he take imodium? He also needs orders for Barium Swallow Study.  Please advise.

## 2018-07-25 NOTE — TELEPHONE ENCOUNTER
See message below  I called and spoke with pt wife  The peg tube keeps getting stopping up and he is ready to start eating   He is drinking water  Want barium swallow study redone  Ate egg today no coughing while while the  nurse was at house today    Started metoprolol yesterday - 1/2 pill   C/o diarrhea for 3 or 4 days   He is using 4 cans daily - supposed to be 5 daily but that was too much  Diarrhea today is bad - 4 times today ( one x was formed )  No abd. Pain he does have nausea  Ms sam wants to know if he can take immodium

## 2018-07-26 ENCOUNTER — TELEPHONE (OUTPATIENT)
Dept: CARDIOTHORACIC SURGERY | Facility: CLINIC | Age: 68
End: 2018-07-26

## 2018-07-26 NOTE — TELEPHONE ENCOUNTER
Spoke to Lorna Woody from [facility]: Ms. Woody reported pt's BP at 111/60, HR 68 and was concerned that it may be too low. Also, pt reports diarrhea x2wks since colonoscopy, Ms. Woody asked if Immodium could be given. Per Tomeka Lopez, EDUARDA, patient's vitals are within normal limits, and pt's diarrhea should be managed by Primary Care or whomever is managing his tube feeds. Ms. Woody verbalized understanding.

## 2018-07-26 NOTE — TELEPHONE ENCOUNTER
To a half tablet of metoprolol questioning and gave him diarrhea he is having.  Not convinced this is the cause of the diarrhea-he will call Cardiology is about alternative

## 2018-07-30 ENCOUNTER — OFFICE VISIT (OUTPATIENT)
Dept: CARDIOLOGY | Facility: CLINIC | Age: 68
End: 2018-07-30
Payer: MEDICARE

## 2018-07-30 VITALS
TEMPERATURE: 98 F | HEART RATE: 87 BPM | BODY MASS INDEX: 19.08 KG/M2 | WEIGHT: 136.31 LBS | SYSTOLIC BLOOD PRESSURE: 99 MMHG | HEIGHT: 71 IN | DIASTOLIC BLOOD PRESSURE: 66 MMHG

## 2018-07-30 DIAGNOSIS — Z95.3 HISTORY OF MITRAL VALVE REPLACEMENT WITH BIOPROSTHETIC VALVE: ICD-10-CM

## 2018-07-30 DIAGNOSIS — Z86.79 H/O ATRIAL FLUTTER: ICD-10-CM

## 2018-07-30 DIAGNOSIS — Z95.1 S/P CABG X 1: ICD-10-CM

## 2018-07-30 DIAGNOSIS — Z95.2 S/P MVR (MITRAL VALVE REPLACEMENT): Primary | ICD-10-CM

## 2018-07-30 DIAGNOSIS — I50.22 CHRONIC SYSTOLIC CONGESTIVE HEART FAILURE: ICD-10-CM

## 2018-07-30 DIAGNOSIS — I10 ESSENTIAL HYPERTENSION: Chronic | ICD-10-CM

## 2018-07-30 DIAGNOSIS — I33.0 SUBACUTE BACTERIAL ENDOCARDITIS: ICD-10-CM

## 2018-07-30 PROCEDURE — 93000 ELECTROCARDIOGRAM COMPLETE: CPT | Mod: S$GLB,,, | Performed by: INTERNAL MEDICINE

## 2018-07-30 PROCEDURE — 99999 PR PBB SHADOW E&M-EST. PATIENT-LVL III: CPT | Mod: PBBFAC,,, | Performed by: INTERNAL MEDICINE

## 2018-07-30 PROCEDURE — 3074F SYST BP LT 130 MM HG: CPT | Mod: CPTII,S$GLB,, | Performed by: INTERNAL MEDICINE

## 2018-07-30 PROCEDURE — 99214 OFFICE O/P EST MOD 30 MIN: CPT | Mod: S$GLB,,, | Performed by: INTERNAL MEDICINE

## 2018-07-30 PROCEDURE — 99499 UNLISTED E&M SERVICE: CPT | Mod: HCNC,S$GLB,, | Performed by: INTERNAL MEDICINE

## 2018-07-30 PROCEDURE — 3078F DIAST BP <80 MM HG: CPT | Mod: CPTII,S$GLB,, | Performed by: INTERNAL MEDICINE

## 2018-07-30 RX ORDER — FUROSEMIDE 40 MG/1
40 TABLET ORAL DAILY
Qty: 30 TABLET | Refills: 1 | Status: SHIPPED | OUTPATIENT
Start: 2018-07-30 | End: 2018-07-31

## 2018-07-30 RX ORDER — ACETAMINOPHEN, DIPHENHYDRAMINE HCL, PHENYLEPHRINE HCL 325; 25; 5 MG/1; MG/1; MG/1
TABLET ORAL
COMMUNITY

## 2018-07-30 RX ORDER — POTASSIUM CHLORIDE 20 MEQ/15ML
20 SOLUTION ORAL DAILY
Qty: 473 ML | Refills: 0 | Status: SHIPPED | OUTPATIENT
Start: 2018-07-30 | End: 2018-07-31

## 2018-07-30 NOTE — LETTER
July 30, 2018      Marvin Go MD  [address]  [facility] [address]           [facility]  [address], [address]  [facility] [address]  Phone: [phone]          Patient: Rodolfo Messina   MR Number: [id number]   YOB: 1950   Date of Visit: 7/30/2018       Dear Dr. Marvin Go:    Thank you for referring Rodolfo Messina to me for evaluation. Attached you will find relevant portions of my assessment and plan of care.    If you have questions, please do not hesitate to call me. I look forward to following Rodolfo Messina along with you.    Sincerely,    Yousuf Lockett MD    Enclosure  CC:  No Recipients    If you would like to receive this communication electronically, please contact [email or web address] or [phone] to request more information on [employer] Link access.    For providers and/or their staff who would like to refer a patient to [facility], please contact us through our one-stop-shop provider referral line, [facility], at [phone].    If you feel you have received this communication in error or would no longer like to receive these types of communications, please e-mail [email or web address]

## 2018-07-30 NOTE — PROGRESS NOTES
Subjective:      Patient ID: Rodolfo Messina is a 68 y.o. male.    Chief Complaint: Hospital Follow Up (CABG & MVR @ [facility] May 25th )    HPI: Pt was diagnosed with mitral valve endocarditis in March. Pt required a urinary catheter in February for retention. Pt had a UTI. Pt required bioprosthetic mitral valve and CABG 5/25/18. Pt completed prolonged course of antibiotics for UTI.    Pt was recently dx with possible leukemia or lymphoma on a bone marrow biopsy which was performed for persistent anemia. Pt has appt with oncologist in August.    Pt has nausea after Jevity is injected into feeding tube.    Review of Systems   Cardiovascular: Negative for chest pain, claudication, dyspnea on exertion, irregular heartbeat, leg swelling, near-syncope, orthopnea, palpitations and syncope.      Speech therapy is encouraging pt to eat more by mouth.  Speech therapist has recommended repeat modified     Pt had been on coumadin immediately after MVR (due to atrial flutter post op) but the warfarin was discontinued by Dr Go due to anemia.    Blood pressure 90 to 110 systolic    Pt has loose stools.      Pt takes Remeron for essential tremor.    Past Medical History:   Diagnosis Date    ANNA (acute kidney injury) 2/22/2018    Anemia     Anxiety     Arthritis     BPH (benign prostatic hyperplasia)     CHF (congestive heart failure) 5/23/2018    Coarse tremors     Coronary artery disease of native artery of native heart with stable angina pectoris 5/21/2018    Diverticulosis     Encounter for blood transfusion     Endocarditis     Hypertension     Urinary retention         Past Surgical History:   Procedure Laterality Date    APPENDECTOMY      COLONOSCOPY      COLONOSCOPY N/A 2/1/2018    Procedure: COLONOSCOPY;  Surgeon: Richar Payan MD;  Location: [facility] ([address] [place] [address]);  Service: Endoscopy;  Laterality: N/A;  PM prep    CREATION OF AORTOCORONARY ARTERY BYPASS N/A 5/25/2018    Procedure:  AORTOCORONARY BYPASS-CABG;  Surgeon: Marvin Go MD;  Location: [address];  Service: Cardiovascular;  Laterality: N/A;    EYE SURGERY Right     cataract extraction    FINGER SURGERY      FRACTURE SURGERY Right     index finger    MITRAL VALVE REPLACEMENT N/A 5/25/2018    Procedure: Mitral Valve Replacement;  Surgeon: Marvin Go MD;  Location: [address];  Service: Cardiovascular;  Laterality: N/A;    TONSILLECTOMY         Family History   Problem Relation Age of Onset    Stroke Mother     Heart disease Mother         CABG    Heart disease Father         CABG    No Known Problems Sister     No Known Problems Brother        Social History     Social History    Marital status:      Spouse name: N/A    Number of children: N/A    Years of education: N/A     Occupational History     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  0    atorvastatin (LIPITOR) 40 MG tablet 1 tablet (40 mg total) by Per G Tube route once daily. 90 tablet 3    loratadine (CLARITIN) 10 mg tablet Take 10 mg by mouth once daily.      mirtazapine (REMERON) 30 MG tablet Take 1 tablet (30 mg total) by mouth every evening. 90 tablet 3    pantoprazole (PROTONIX) 40 MG tablet Take 1 tablet (40 mg total) by mouth once daily. 30 tablet 5    tamsulosin (FLOMAX) 0.4 mg Cap 1 capsule (0.4 mg total) by Per G Tube route once daily. 30 capsule 11    [DISCONTINUED] amLODIPine (NORVASC) 10 MG tablet 1 tablet (10 mg total) by Per G Tube route once daily. 30 tablet 11    [DISCONTINUED] furosemide  "(LASIX) 40 MG tablet Take 1 tablet (40 mg total) by mouth once daily. 30 tablet 1    [DISCONTINUED] potassium chloride 10% (KAYCIEL) 20 mEq/15 mL oral solution 15 mLs (20 mEq total) by Per G Tube route once daily. 473 mL 0    [DISCONTINUED] metoprolol succinate (TOPROL-XL) 25 MG 24 hr tablet Take 0.5 tablets (12.5 mg total) by mouth once daily. 15 tablet 11     No current facility-administered medications on file prior to visit.        Review of patient's allergies indicates:   Allergen Reactions    Shellfish containing products     Augmentin [amoxicillin-pot clavulanate]      Patient felt that it raised BP and requested to have it added as intolerance. Tolerated unasyn and ampicillin.    Ciprofloxacin     Flagyl [metronidazole]     Lisinopril Other (See Comments)     cough    Mysoline [primidone]     Omnicef [cefdinir]      Objective:     Vitals:    07/30/18 1434   BP: 99/66   BP Location: Left arm   Patient Position: Sitting   BP Method: Medium (Automatic)   Pulse: 87   Temp: 98.1 °F (36.7 °C)   Weight: 61.8 kg (136 lb 4.8 oz)   Height: 5' 11" (1.803 m)        Physical Exam   Constitutional: He is oriented to person, place, and time. He appears well-developed and well-nourished. No distress.   Eyes: No scleral icterus.   Neck: No JVD present. Carotid bruit is not present.   Cardiovascular: Regular rhythm and normal heart sounds.  Exam reveals no gallop and no friction rub.    No murmur heard.  Pulmonary/Chest: Effort normal and breath sounds normal. No respiratory distress.   Musculoskeletal: He exhibits no edema.   Neurological: He is alert and oriented to person, place, and time.   Skin: Skin is warm and dry. He is not diaphoretic.   Psychiatric: He has a normal mood and affect. His behavior is normal. Judgment and thought content normal.   Vitals reviewed.     Wt down 20 lbs    ECG:NSR with first degree Av block, otherwise normal.     Most recent echo this month shows LVEF 50% and normally functioning " mitral prosthesis.    Recent Hgb 8.2  Recent BMP OK  Assessment:     1. S/P MVR (mitral valve replacement)    2. S/P CABG x 1    3. H/O atrial flutter    4. Chronic systolic congestive heart failure    5. Subacute bacterial endocarditis    6. Essential hypertension    7. History of mitral valve replacement with bioprosthetic valve      Plan:   Rodolfo was seen today for hospital follow up.    Diagnoses and all orders for this visit:    S/P MVR (mitral valve replacement)  -     IN OFFICE EKG 12-LEAD (to [place])    S/P CABG x 1    H/O atrial flutter    Chronic systolic congestive heart failure    Subacute bacterial endocarditis    Essential hypertension    History of mitral valve replacement with bioprosthetic valve  -     IN OFFICE EKG 12-LEAD (to [place])    Other orders  -     furosemide (LASIX) 40 MG tablet; Take 1 tablet (40 mg total) by mouth once daily. Take only as needed for swelling of feet or shortness of breath  -     potassium chloride 10% (KAYCIEL) 20 mEq/15 mL oral solution; 15 mLs (20 mEq total) by Per G Tube route once daily. Take only on days when lasix is taken     Cardiac rehab in [place]  Repeat swallow study as recommended by speech therapy--already ordered by Dr Claudio  F/u with GI for diarrhea  Discontinue the amlodipine due to hypotension  Hold the furosemide and potassium unless feet are swollen or if pt is short of breath  F/u with hematologist/oncologist  RTC 2 weeks        No Follow-up on file.

## 2018-07-31 ENCOUNTER — OFFICE VISIT (OUTPATIENT)
Dept: NEUROLOGY | Facility: CLINIC | Age: 68
End: 2018-07-31
Payer: MEDICARE

## 2018-07-31 VITALS
WEIGHT: 137.56 LBS | HEIGHT: 71 IN | HEART RATE: 84 BPM | DIASTOLIC BLOOD PRESSURE: 63 MMHG | SYSTOLIC BLOOD PRESSURE: 98 MMHG | BODY MASS INDEX: 19.26 KG/M2

## 2018-07-31 DIAGNOSIS — R49.8 VOICE TREMOR: Primary | ICD-10-CM

## 2018-07-31 LAB
ANNOTATION COMMENT IMP: NORMAL
NGS CLINCIAL TRIALS: NORMAL
NGS INDICATION OF TEST: NORMAL
NGS INTERPRETATION: NORMAL
NGS ONCOHEME PANEL GENE LIST: NORMAL
NGS PATHOGENIC MUTATIONS DETECTED: NORMAL
NGS REVIEWED BY:: NORMAL
NGS VARIANTS OF UNKNOWN SIGNIFICANCE: NORMAL
REF LAB TEST METHOD: NORMAL
SPECIMEN SOURCE: NORMAL
TEST PERFORMANCE INFO SPEC: NORMAL

## 2018-07-31 PROCEDURE — 99214 OFFICE O/P EST MOD 30 MIN: CPT | Mod: S$GLB,,, | Performed by: PSYCHIATRY & NEUROLOGY

## 2018-07-31 PROCEDURE — 99999 PR PBB SHADOW E&M-EST. PATIENT-LVL III: CPT | Mod: PBBFAC,,, | Performed by: PSYCHIATRY & NEUROLOGY

## 2018-07-31 PROCEDURE — 3078F DIAST BP <80 MM HG: CPT | Mod: CPTII,S$GLB,, | Performed by: PSYCHIATRY & NEUROLOGY

## 2018-07-31 PROCEDURE — 3074F SYST BP LT 130 MM HG: CPT | Mod: CPTII,S$GLB,, | Performed by: PSYCHIATRY & NEUROLOGY

## 2018-07-31 NOTE — PROGRESS NOTES
Name: Rodolfo Messina  MRN: [id number]   CSN: [id number]      Date: 07/31/2018      Chief Complaint / Interval History:   - from Nov 2016 last seen  - has had low blood counts, needing multiple transfusions, also found LAD and another vessel closed --> had multiple bypasses in May  - had been losing weight  - tremors now are a bit worse overall, had been on lower remeron from 30 --> 15.  - trouble with using hands especially near the mouth  - has more voice tremor now        From 11/2016:  Since last visit, tremor has gotten better on Mirtazapine. Still causing him trouble with writing but no longer having any embarrassment when going out to eat.  He is sleeping well too on Mirtazapine. Had Right cataract surgery three weeks ago. States he is seeing much better now.       History of Present Illness (HPI):  Epic down, see notes:          Nonmotor/Premotor ROS:  Hyposmia (HENT)?No  RBD/sleep issues (Constitutional)?No  Depression/anxiety (Psychiatric)?Yes multiple life stressors  Fatigue (Constitutional)?No  Constipation (GI)?No  Urinary issues ()?No  Sexual dysfunction ()?No  Orthostasis (Cardiovascular)?No  Leg swelling (Cardiovascular)? No  Falls (Musculoskeletal)?No  Cognitive impairment (Neurologic)?No  Psychoses (Psychiatric)?No  Pain/Paresthesia (Neurologic)?No  Visual changes (Eyes)?No  Moles / skin changes (Skin)?No  Stridor / SOB (Pulm)?No  Bruising (Heme)?No    Past Medical History: The patient  has a past medical history of ANNA (acute kidney injury) (2/22/2018); Anemia; Anxiety; Arthritis; BPH (benign prostatic hyperplasia); CHF (congestive heart failure) (5/23/2018); Coarse tremors; Coronary artery disease of native artery of native heart with stable angina pectoris (5/21/2018); Diverticulosis; Encounter for blood transfusion; Endocarditis; Hypertension; and Urinary retention.    Social History: The patient  reports that he has never smoked. He has never used smokeless tobacco. He reports that he does  "not drink alcohol or use drugs.    Family History: Their family history includes Heart disease in his father and mother; No Known Problems in his brother and sister; Stroke in his mother.    Allergies: Shellfish containing products; Augmentin [amoxicillin-pot clavulanate]; Ciprofloxacin; Flagyl [metronidazole]; Lisinopril; Mysoline [primidone]; and Omnicef [cefdinir]     Meds:   Current Outpatient Prescriptions on File Prior to Visit   Medication Sig Dispense Refill    aspirin 81 MG Chew 4 tablets (324 mg total) by Per G Tube route once daily.  0    atorvastatin (LIPITOR) 40 MG tablet 1 tablet (40 mg total) by Per G Tube route once daily. 90 tablet 3    loratadine (CLARITIN) 10 mg tablet Take 10 mg by mouth once daily.      melatonin 10 mg Tab Take by mouth.      mirtazapine (REMERON) 30 MG tablet Take 1 tablet (30 mg total) by mouth every evening. 90 tablet 3    pantoprazole (PROTONIX) 40 MG tablet Take 1 tablet (40 mg total) by mouth once daily. 30 tablet 5    tamsulosin (FLOMAX) 0.4 mg Cap 1 capsule (0.4 mg total) by Per G Tube route once daily. 30 capsule 11    [DISCONTINUED] furosemide (LASIX) 40 MG tablet Take 1 tablet (40 mg total) by mouth once daily. Take only as needed for swelling of feet or shortness of breath 30 tablet 1    [DISCONTINUED] potassium chloride 10% (KAYCIEL) 20 mEq/15 mL oral solution 15 mLs (20 mEq total) by Per G Tube route once daily. Take only on days when lasix is taken 473 mL 0     No current facility-administered medications on file prior to visit.        Exam:  BP 98/63   Pulse 84   Ht 5' 11" (1.803 m)   Wt 62.4 kg (137 lb 9.1 oz)   BMI 19.19 kg/m²           * Specialized movement exam  No hypophonic speech.    + Vocal tremor    No facial masking.   No cogwheel rigidity.     No bradykinesia.   + postural/kinetic tremor no resting - also vocal and now a bit more L>R   No other dystonia, chorea, athetosis, myoclonus, or tics.   No motor impersistence.   Normal-based " gait.   No shortened stride length.   No abnormal arm swing.     No postural instability.              Laboratory/Radiological:  - Results:  Clinical Support on 07/18/2018   Component Date Value Ref Range Status    EF 07/18/2018 50  55 - 65 Final    Aortic Valve Regurgitation 07/18/2018 MILD   Final    Est. PA Systolic Pressure 07/18/2018 30.04   Final    Tricuspid Valve Regurgitation 07/18/2018 MILD TO MODERATE   Final   Office Visit on 07/17/2018   Component Date Value Ref Range Status    WBC 07/17/2018 4.3  3.8 - 10.8 Thousand/uL Final    RBC 07/17/2018 2.65* 4.20 - 5.80 Million/uL Final    Hemoglobin 07/17/2018 8.2* 13.2 - 17.1 g/dL Final    Hematocrit 07/17/2018 25.0* 38.5 - 50.0 % Final    MCV 07/17/2018 94.3  80.0 - 100.0 fL Final    MCH 07/17/2018 30.9  27.0 - 33.0 pg Final    MCHC 07/17/2018 32.8  32.0 - 36.0 g/dL Final    RDW 07/17/2018 15.8* 11.0 - 15.0 % Final    Platelets 07/17/2018 160  140 - 400 Thousand/uL Final    MPV 07/17/2018 11.6  7.5 - 12.5 fL Final    Neutrophils Absolute 07/17/2018 2812  1,500 - 7,800 cells/uL Final    Lymph # 07/17/2018 800* 850 - 3,900 cells/uL Final    Mono # 07/17/2018 340  200 - 950 cells/uL Final    Eos # 07/17/2018 318  15 - 500 cells/uL Final    Baso # 07/17/2018 30  0 - 200 cells/uL Final    Neutrophils Relative 07/17/2018 65.4  % Final    Lymph% 07/17/2018 18.6  % Final    Mono% 07/17/2018 7.9  % Final    Eosinophil% 07/17/2018 7.4  % Final    Basophil% 07/17/2018 0.7  % Final   Admission on 06/29/2018, Discharged on 07/13/2018   No results displayed because visit has over 200 results.      Anti-coag visit on 06/28/2018   Component Date Value Ref Range Status    INR 06/28/2018 3.1   Final   Lab Visit on 06/26/2018   Component Date Value Ref Range Status    WBC 06/26/2018 5.69  3.90 - 12.70 K/uL Final    RBC 06/26/2018 2.59* 4.60 - 6.20 M/uL Final    Hemoglobin 06/26/2018 7.5* 14.0 - 18.0 g/dL Final    Hematocrit 06/26/2018 26.1* 40.0 -  54.0 % Final    MCV 06/26/2018 101* 82 - 98 fL Final    MCH 06/26/2018 29.0  27.0 - 31.0 pg Final    MCHC 06/26/2018 28.7* 32.0 - 36.0 g/dL Final    RDW 06/26/2018 16.3* 11.5 - 14.5 % Final    Platelets 06/26/2018 165  150 - 350 K/uL Final    MPV 06/26/2018 12.7  9.2 - 12.9 fL Final    Immature Granulocytes 06/26/2018 0.2  0.0 - 0.5 % Final    Gran # (ANC) 06/26/2018 4.3  1.8 - 7.7 K/uL Final    Immature Grans (Abs) 06/26/2018 0.01  0.00 - 0.04 K/uL Final    Lymph # 06/26/2018 0.5* 1.0 - 4.8 K/uL Final    Mono # 06/26/2018 0.4  0.3 - 1.0 K/uL Final    Eos # 06/26/2018 0.4  0.0 - 0.5 K/uL Final    Baso # 06/26/2018 0.03  0.00 - 0.20 K/uL Final    nRBC 06/26/2018 0  0 /100 WBC Final    Gran% 06/26/2018 76.2* 38.0 - 73.0 % Final    Lymph% 06/26/2018 9.5* 18.0 - 48.0 % Final    Mono% 06/26/2018 6.2  4.0 - 15.0 % Final    Eosinophil% 06/26/2018 7.4  0.0 - 8.0 % Final    Basophil% 06/26/2018 0.5  0.0 - 1.9 % Final    Differential Method 06/26/2018 Automated   Final    Sodium 06/26/2018 160* 136 - 145 mmol/L Final    Potassium 06/26/2018 5.6* 3.5 - 5.1 mmol/L Final    Chloride 06/26/2018 129* 95 - 110 mmol/L Final    CO2 06/26/2018 22* 23 - 29 mmol/L Final    Glucose 06/26/2018 121* 70 - 110 mg/dL Final    BUN, Bld 06/26/2018 61* 8 - 23 mg/dL Final    Creatinine 06/26/2018 2.2* 0.5 - 1.4 mg/dL Final    Calcium 06/26/2018 9.3  8.7 - 10.5 mg/dL Final    Total Protein 06/26/2018 6.5  6.0 - 8.4 g/dL Final    Albumin 06/26/2018 2.5* 3.5 - 5.2 g/dL Final    Total Bilirubin 06/26/2018 0.6  0.1 - 1.0 mg/dL Final    Alkaline Phosphatase 06/26/2018 81  55 - 135 U/L Final    AST 06/26/2018 52* 10 - 40 U/L Final    ALT 06/26/2018 63* 10 - 44 U/L Final    Anion Gap 06/26/2018 9  8 - 16 mmol/L Final    eGFR if  06/26/2018 34.3* >60 mL/min/1.73 m^2 Final    eGFR if non  06/26/2018 29.7* >60 mL/min/1.73 m^2 Final   Anti-coag visit on 06/25/2018   Component Date Value Ref  Range Status    INR 06/25/2018 2.1   Final   Admission on 06/23/2018, Discharged on 06/24/2018   Component Date Value Ref Range Status    WBC 06/23/2018 8.13  3.90 - 12.70 K/uL Final    RBC 06/23/2018 2.20* 4.60 - 6.20 M/uL Final    Hemoglobin 06/23/2018 6.4* 14.0 - 18.0 g/dL Final    Hematocrit 06/23/2018 22.3* 40.0 - 54.0 % Final    MCV 06/23/2018 101* 82 - 98 fL Final    MCH 06/23/2018 29.1  27.0 - 31.0 pg Final    MCHC 06/23/2018 28.7* 32.0 - 36.0 g/dL Final    RDW 06/23/2018 15.9* 11.5 - 14.5 % Final    Platelets 06/23/2018 167  150 - 350 K/uL Final    MPV 06/23/2018 12.4  9.2 - 12.9 fL Final    Gran # (ANC) 06/23/2018 5.7  1.8 - 7.7 K/uL Final    Lymph # 06/23/2018 1.0  1.0 - 4.8 K/uL Final    Mono # 06/23/2018 0.7  0.3 - 1.0 K/uL Final    Eos # 06/23/2018 0.7* 0.0 - 0.5 K/uL Final    Baso # 06/23/2018 0.02  0.00 - 0.20 K/uL Final    Gran% 06/23/2018 70.2  38.0 - 73.0 % Final    Lymph% 06/23/2018 12.1* 18.0 - 48.0 % Final    Mono% 06/23/2018 8.6  4.0 - 15.0 % Final    Eosinophil% 06/23/2018 8.9* 0.0 - 8.0 % Final    Basophil% 06/23/2018 0.2  0.0 - 1.9 % Final    Platelet Estimate 06/23/2018 Appears normal   Final    Aniso 06/23/2018 Slight   Final    Poik 06/23/2018 Slight   Final    Poly 06/23/2018 Occasional   Final    Hypo 06/23/2018 Occasional   Final    Ovalocytes 06/23/2018 Occasional   Final    Differential Method 06/23/2018 Automated   Final    Sodium 06/23/2018 154* 136 - 145 mmol/L Final    Potassium 06/23/2018 4.6  3.5 - 5.1 mmol/L Final    Chloride 06/23/2018 121* 95 - 110 mmol/L Final    CO2 06/23/2018 27  23 - 29 mmol/L Final    Glucose 06/23/2018 100  70 - 110 mg/dL Final    BUN, Bld 06/23/2018 54* 2 - 20 mg/dL Final    Creatinine 06/23/2018 1.97* 0.50 - 1.40 mg/dL Final    Calcium 06/23/2018 8.5* 8.7 - 10.5 mg/dL Final    Total Protein 06/23/2018 6.3  6.0 - 8.4 g/dL Final    Albumin 06/23/2018 3.0* 3.5 - 5.2 g/dL Final    Total Bilirubin 06/23/2018 0.6   0.1 - 1.0 mg/dL Final    Alkaline Phosphatase 06/23/2018 73  38 - 126 U/L Final    AST 06/23/2018 40  15 - 46 U/L Final    ALT 06/23/2018 41  10 - 44 U/L Final    Anion Gap 06/23/2018 6* 8 - 16 mmol/L Final    eGFR if  06/23/2018 39.2* >60 mL/min/1.73 m^2 Final    eGFR if non  06/23/2018 33.9* >60 mL/min/1.73 m^2 Final    Prothrombin Time 06/23/2018 20.3* 9.0 - 12.5 sec Final    INR 06/23/2018 1.9* 0.8 - 1.2 Final    NT-proBNP 06/23/2018 4650* 5 - 900 pg/mL Final    Group & Rh 06/23/2018 A POS   Final    Indirect Juan Alberto 06/23/2018 NEG   Final    UNIT NUMBER 06/23/2018 [id number]   Final    PRODUCT CODE 06/23/2018 [id number]   Final    DISPENSE STATUS 06/23/2018 TRANSFUSED   Final    CODING SYSTEM 06/23/2018 [id number]   Final    Unit Blood Type Code 06/23/2018 6200   Final    Unit Blood Type 06/23/2018 A POS   Final    Unit Expiration 06/23/2018 [id number]   Final    UNIT NUMBER 06/23/2018 [id number]   Final    PRODUCT CODE 06/23/2018 [id number]   Final    DISPENSE STATUS 06/23/2018 TRANSFUSED   Final    CODING SYSTEM 06/23/2018 [id number]   Final    Unit Blood Type Code 06/23/2018 6200   Final    Unit Blood Type 06/23/2018 A POS   Final    Unit Expiration 06/23/2018 [id number]   Final    WBC 06/23/2018 7.13  3.90 - 12.70 K/uL Final    RBC 06/23/2018 2.04* 4.60 - 6.20 M/uL Final    Hemoglobin 06/23/2018 6.0* 14.0 - 18.0 g/dL Final    Hematocrit 06/23/2018 20.1* 40.0 - 54.0 % Final    MCV 06/23/2018 99* 82 - 98 fL Final    MCH 06/23/2018 29.4  27.0 - 31.0 pg Final    MCHC 06/23/2018 29.9* 32.0 - 36.0 g/dL Final    RDW 06/23/2018 16.0* 11.5 - 14.5 % Final    Platelets 06/23/2018 151  150 - 350 K/uL Final    MPV 06/23/2018 11.4  9.2 - 12.9 fL Final    Gran # (ANC) 06/23/2018 5.1  1.8 - 7.7 K/uL Final    Lymph # 06/23/2018 0.7* 1.0 - 4.8 K/uL Final    Mono # 06/23/2018 0.7  0.3 - 1.0 K/uL Final    Eos # 06/23/2018 0.6* 0.0 - 0.5 K/uL Final    Baso #  06/23/2018 0.01  0.00 - 0.20 K/uL Final    Gran% 06/23/2018 72.1  38.0 - 73.0 % Final    Lymph% 06/23/2018 9.5* 18.0 - 48.0 % Final    Mono% 06/23/2018 9.5  4.0 - 15.0 % Final    Eosinophil% 06/23/2018 8.8* 0.0 - 8.0 % Final    Basophil% 06/23/2018 0.1  0.0 - 1.9 % Final    Differential Method 06/23/2018 Automated   Final    Sodium 06/23/2018 154* 136 - 145 mmol/L Final    Potassium 06/23/2018 4.6  3.5 - 5.1 mmol/L Final    Chloride 06/23/2018 121* 95 - 110 mmol/L Final    CO2 06/23/2018 28  23 - 29 mmol/L Final    Glucose 06/23/2018 129* 70 - 110 mg/dL Final    BUN, Bld 06/23/2018 55* 8 - 23 mg/dL Final    Creatinine 06/23/2018 2.1* 0.5 - 1.4 mg/dL Final    Calcium 06/23/2018 8.8  8.7 - 10.5 mg/dL Final    Total Protein 06/23/2018 5.8* 6.0 - 8.4 g/dL Final    Albumin 06/23/2018 2.3* 3.5 - 5.2 g/dL Final    Total Bilirubin 06/23/2018 0.5  0.1 - 1.0 mg/dL Final    Alkaline Phosphatase 06/23/2018 67  55 - 135 U/L Final    AST 06/23/2018 32  10 - 40 U/L Final    ALT 06/23/2018 36  10 - 44 U/L Final    Anion Gap 06/23/2018 5* 8 - 16 mmol/L Final    eGFR if  06/23/2018 36* >60 mL/min/1.73 m^2 Final    eGFR if non  06/23/2018 31* >60 mL/min/1.73 m^2 Final    POCT Glucose 06/23/2018 125* 70 - 110 mg/dL Final    Hemoglobin 06/23/2018 8.1* 14.0 - 18.0 g/dL Final   Anti-coag visit on 06/22/2018   Component Date Value Ref Range Status    INR 06/22/2018 1.5   Final   Admission on 05/23/2018, Discharged on 06/20/2018   No results displayed because visit has over 200 results.      Admission on 05/08/2018, Discharged on 05/08/2018   Component Date Value Ref Range Status    Coronary Stenosis 05/08/2018 >= 50%*  Final    Sodium 05/08/2018 143  136 - 145 mmol/L Final    Potassium 05/08/2018 4.3  3.5 - 5.1 mmol/L Final    Chloride 05/08/2018 111* 95 - 110 mmol/L Final    CO2 05/08/2018 24  23 - 29 mmol/L Final    Glucose 05/08/2018 86  70 - 110 mg/dL Final    BUN, Bld  05/08/2018 33* 8 - 23 mg/dL Final    Creatinine 05/08/2018 1.4  0.5 - 1.4 mg/dL Final    Calcium 05/08/2018 9.4  8.7 - 10.5 mg/dL Final    Anion Gap 05/08/2018 8  8 - 16 mmol/L Final    eGFR if  05/08/2018 59.7* >60 mL/min/1.73 m^2 Final    eGFR if non African American 05/08/2018 51.6* >60 mL/min/1.73 m^2 Final    WBC 05/08/2018 4.60  3.90 - 12.70 K/uL Final    RBC 05/08/2018 2.94* 4.60 - 6.20 M/uL Final    Hemoglobin 05/08/2018 9.2* 14.0 - 18.0 g/dL Final    Hematocrit 05/08/2018 28.1* 40.0 - 54.0 % Final    MCV 05/08/2018 96  82 - 98 fL Final    MCH 05/08/2018 31.3* 27.0 - 31.0 pg Final    MCHC 05/08/2018 32.7  32.0 - 36.0 g/dL Final    RDW 05/08/2018 15.1* 11.5 - 14.5 % Final    Platelets 05/08/2018 153  150 - 350 K/uL Final    MPV 05/08/2018 11.6  9.2 - 12.9 fL Final    Immature Granulocytes 05/08/2018 0.2  0.0 - 0.5 % Final    Gran # (ANC) 05/08/2018 3.1  1.8 - 7.7 K/uL Final    Immature Grans (Abs) 05/08/2018 0.01  0.00 - 0.04 K/uL Final    Lymph # 05/08/2018 0.9* 1.0 - 4.8 K/uL Final    Mono # 05/08/2018 0.4  0.3 - 1.0 K/uL Final    Eos # 05/08/2018 0.3  0.0 - 0.5 K/uL Final    Baso # 05/08/2018 0.03  0.00 - 0.20 K/uL Final    nRBC 05/08/2018 0  0 /100 WBC Final    Gran% 05/08/2018 66.3  38.0 - 73.0 % Final    Lymph% 05/08/2018 18.5  18.0 - 48.0 % Final    Mono% 05/08/2018 7.8  4.0 - 15.0 % Final    Eosinophil% 05/08/2018 6.5  0.0 - 8.0 % Final    Basophil% 05/08/2018 0.7  0.0 - 1.9 % Final    Differential Method 05/08/2018 Automated   Final    Group & Rh 05/08/2018 A POS   Final    Indirect Juan Alberto 05/08/2018 NEG   Final   There may be more visits with results that are not included.     - Independent review of images: none    Diagnoses:          Essential tremor with bilateral hand and vocal tremor.    Medical Decision Making:  - referral to [facility] again (missed appt while in hospital) and query if vocal tremor/dysphonia is contributing to dysphagia?  - Keep  Mirtazapine to 30mg daily --> may consider going higher versus gabapentin  - Discussed alternative options including adding 2nd agent, no medication changes, and DBS consideration   - will see in 6 weeks to see if improving on current regimen            Clem Liu MD, MPH  Division of Movement and Memory Disorders  [facility]

## 2018-08-02 ENCOUNTER — LAB VISIT (OUTPATIENT)
Dept: LAB | Facility: HOSPITAL | Age: 68
End: 2018-08-02
Payer: MEDICARE

## 2018-08-02 ENCOUNTER — OFFICE VISIT (OUTPATIENT)
Dept: HEMATOLOGY/ONCOLOGY | Facility: CLINIC | Age: 68
End: 2018-08-02
Payer: MEDICARE

## 2018-08-02 VITALS
OXYGEN SATURATION: 100 % | RESPIRATION RATE: 18 BRPM | BODY MASS INDEX: 19.26 KG/M2 | HEIGHT: 70 IN | SYSTOLIC BLOOD PRESSURE: 104 MMHG | DIASTOLIC BLOOD PRESSURE: 61 MMHG | HEART RATE: 82 BPM | WEIGHT: 134.5 LBS | TEMPERATURE: 98 F

## 2018-08-02 DIAGNOSIS — C85.10 B-CELL LYMPHOMA, UNSPECIFIED B-CELL LYMPHOMA TYPE, UNSPECIFIED BODY REGION: Primary | ICD-10-CM

## 2018-08-02 DIAGNOSIS — N18.30 CKD (CHRONIC KIDNEY DISEASE) STAGE 3, GFR 30-59 ML/MIN: ICD-10-CM

## 2018-08-02 DIAGNOSIS — D64.9 NORMOCYTIC ANEMIA: ICD-10-CM

## 2018-08-02 DIAGNOSIS — D64.9 ANEMIA, UNSPECIFIED TYPE: ICD-10-CM

## 2018-08-02 PROBLEM — C85.90 NON HODGKIN'S LYMPHOMA: Status: ACTIVE | Noted: 2018-08-02

## 2018-08-02 LAB
ALBUMIN SERPL BCP-MCNC: 3.5 G/DL
ALP SERPL-CCNC: 83 U/L
ALT SERPL W/O P-5'-P-CCNC: 27 U/L
ANION GAP SERPL CALC-SCNC: 6 MMOL/L
AST SERPL-CCNC: 24 U/L
BASOPHILS # BLD AUTO: 0.03 K/UL
BASOPHILS NFR BLD: 0.7 %
BILIRUB SERPL-MCNC: 0.7 MG/DL
BUN SERPL-MCNC: 49 MG/DL
CALCIUM SERPL-MCNC: 9.8 MG/DL
CHLORIDE SERPL-SCNC: 107 MMOL/L
CO2 SERPL-SCNC: 29 MMOL/L
CREAT SERPL-MCNC: 2.4 MG/DL
CRP SERPL-MCNC: 2.7 MG/L
DIFFERENTIAL METHOD: ABNORMAL
EOSINOPHIL # BLD AUTO: 0.4 K/UL
EOSINOPHIL NFR BLD: 8.9 %
ERYTHROCYTE [DISTWIDTH] IN BLOOD BY AUTOMATED COUNT: 15.9 %
ERYTHROCYTE [SEDIMENTATION RATE] IN BLOOD BY WESTERGREN METHOD: 25 MM/HR
EST. GFR  (AFRICAN AMERICAN): 30.9 ML/MIN/1.73 M^2
EST. GFR  (NON AFRICAN AMERICAN): 26.7 ML/MIN/1.73 M^2
FERRITIN SERPL-MCNC: 462 NG/ML
GLUCOSE SERPL-MCNC: 105 MG/DL
HCT VFR BLD AUTO: 30.9 %
HGB BLD-MCNC: 9.9 G/DL
IMM GRANULOCYTES # BLD AUTO: 0.01 K/UL
IMM GRANULOCYTES NFR BLD AUTO: 0.2 %
IRON SERPL-MCNC: 63 UG/DL
LYMPHOCYTES # BLD AUTO: 0.8 K/UL
LYMPHOCYTES NFR BLD: 18.7 %
MCH RBC QN AUTO: 31.5 PG
MCHC RBC AUTO-ENTMCNC: 32 G/DL
MCV RBC AUTO: 98 FL
MONOCYTES # BLD AUTO: 0.3 K/UL
MONOCYTES NFR BLD: 7.4 %
NEUTROPHILS # BLD AUTO: 2.6 K/UL
NEUTROPHILS NFR BLD: 64.1 %
NRBC BLD-RTO: 0 /100 WBC
PLATELET # BLD AUTO: 157 K/UL
PMV BLD AUTO: 11.1 FL
POTASSIUM SERPL-SCNC: 4.4 MMOL/L
PROT SERPL-MCNC: 6.5 G/DL
RBC # BLD AUTO: 3.14 M/UL
RETICS/RBC NFR AUTO: 0.8 %
SATURATED IRON: 36 %
SODIUM SERPL-SCNC: 142 MMOL/L
TOTAL IRON BINDING CAPACITY: 175 UG/DL
TRANSFERRIN SERPL-MCNC: 118 MG/DL
WBC # BLD AUTO: 4.06 K/UL

## 2018-08-02 PROCEDURE — 80053 COMPREHEN METABOLIC PANEL: CPT

## 2018-08-02 PROCEDURE — 3078F DIAST BP <80 MM HG: CPT | Mod: CPTII,GC,S$GLB, | Performed by: INTERNAL MEDICINE

## 2018-08-02 PROCEDURE — 3074F SYST BP LT 130 MM HG: CPT | Mod: CPTII,GC,S$GLB, | Performed by: INTERNAL MEDICINE

## 2018-08-02 PROCEDURE — 85651 RBC SED RATE NONAUTOMATED: CPT

## 2018-08-02 PROCEDURE — 82728 ASSAY OF FERRITIN: CPT

## 2018-08-02 PROCEDURE — 99215 OFFICE O/P EST HI 40 MIN: CPT | Mod: GC,S$GLB,, | Performed by: INTERNAL MEDICINE

## 2018-08-02 PROCEDURE — 99999 PR PBB SHADOW E&M-EST. PATIENT-LVL III: CPT | Mod: PBBFAC,GC,,

## 2018-08-02 PROCEDURE — 85045 AUTOMATED RETICULOCYTE COUNT: CPT

## 2018-08-02 PROCEDURE — 36415 COLL VENOUS BLD VENIPUNCTURE: CPT

## 2018-08-02 PROCEDURE — 83540 ASSAY OF IRON: CPT

## 2018-08-02 PROCEDURE — 85025 COMPLETE CBC W/AUTO DIFF WBC: CPT

## 2018-08-02 PROCEDURE — 86140 C-REACTIVE PROTEIN: CPT

## 2018-08-02 NOTE — PROGRESS NOTES
Distress Screening Results: Psychosocial Distress screening score of Distress Score: 4 not completed. Not appropriate for completion at this visit.

## 2018-08-02 NOTE — Clinical Note
Heismael Mrs Jonathan and  Aj,  Please schedule the patient with an appointment with me on 8/30/18 with CBC, CMP, LDH and uric acid prior to the appt.  Thanks  López Go MD PGY-VI Hematology and Oncology Pager:[phone]

## 2018-08-02 NOTE — PROGRESS NOTES
PATIENT: Rodolfo Messina  MRN: [id number]  DATE: 8/2/2018      Diagnosis:   1. CKD (chronic kidney disease) stage 3, GFR 30-59 ml/min    2. B-cell lymphoma, unspecified B-cell lymphoma type, unspecified body region    3. Normocytic anemia        Chief Complaint: No chief complaint on file.      Oncologic History:      Oncologic History     Oncologic Treatment     Pathology       Subjective:    Initial History: Mr. Messina is a 68 y.o. male who presents for follow up from hospitalization in which the patient underwent a BM biopsy for work up of anemia and found to have a low grade B-cell non-hodgkin's lymphoma.  Pt has a h/o anemia documented in the [facility] since 2015 with decline in 1/2018, HFrEF, MVR 5/2018, CAD s/p CABG 5/2018, dysphagia developed after CABG, chronic lackey who presents to clinic for establishment of care for anemia and recently diagnosed B-cell lymphoma, non hodgkin's lymphoma.  The patient recently was admitted to the hospital from 6/29-7/13  During the admission the patient was seen to have supra therapeutic INR at 4.1 with hemoglobin of 6.6.  The patient was taken off of coumadin on 7/09/18.  GI consulted 7/09/18 for positive FOBT on 7/08/18 and decreasing hemoglobin. CT Renal Stone Study performed on 7/09/18 showed a new 5.2 cm heterogeneous left psoas collection concerning for blood, moderate bilateral pleural effusions, postsurgical change of mitral valve replacement, gastrojejunostomy tube, right renal cyst, bladder diverticulum, diverticulosis, and aortic ectasia and atherosclerosis.  VCE was performed on 7/11/18 and showed no obvious signs of bleeding.  Heme/Onc consulted for persistent and worsening anemia on 7/13/18.  BM biopsy was performed at that time given his chronic history of anemia and unclear etiology. BM biopsy from 7/13/18 returned positive for low grade B-cell lymphoma.  The patient had been previously worked up for anemia with EGD and colonoscopy on 2/01/18 showing  non bleeding erosive gastritis, internal hemorrhoids, and diverticulosis.  In addition the patient had an SPEP checked which was normal and normal B-12 and folate in the past. Currently the patient states he feels well but is having difficulty getting all of his nutrition through his PEG tube.  The patient had a PEG tube placed shortly after his CABG due to dysphagia. The patient denies fever, chills, night sweats, weight loss, new lumps or bumps, easy bruising or bleeding.    Past Medical History:   Past Medical History:   Diagnosis Date    ANNA (acute kidney injury) 2/22/2018    Anemia     Anxiety     Arthritis     BPH (benign prostatic hyperplasia)     CHF (congestive heart failure) 5/23/2018    Coarse tremors     Coronary artery disease of native artery of native heart with stable angina pectoris 5/21/2018    Diverticulosis     Encounter for blood transfusion     Endocarditis     Hypertension     Non Hodgkin's lymphoma 8/2/2018    Urinary retention        Past Surgical HIstory:   Past Surgical History:   Procedure Laterality Date    APPENDECTOMY      COLONOSCOPY      COLONOSCOPY N/A 2/1/2018    Procedure: COLONOSCOPY;  Surgeon: Richar Payan MD;  [facility] [place] [facility]  Service: Endoscopy;  Laterality: N/A;  PM prep    CREATION OF AORTOCORONARY ARTERY BYPASS N/A 5/25/2018    Procedure: AORTOCORONARY BYPASS-CABG;  Surgeon: Marvin Go MD;  Location: [facility] OR [address] [place] [address];  Service: Cardiovascular;  Laterality: N/A;    EYE SURGERY Right     cataract extraction    FINGER SURGERY      FRACTURE SURGERY Right     index finger    MITRAL VALVE REPLACEMENT N/A 5/25/2018    Procedure: Mitral Valve Replacement;  Surgeon: Marvin Go MD;  Location: [facility] OR [address] [place] [address];  Service: Cardiovascular;  Laterality: N/A;    TONSILLECTOMY         Family History:   Family History   Problem Relation Age of Onset    Stroke Mother     Heart disease Mother         CABG    Heart disease Father         CABG     No Known Problems Sister     No Known Problems Brother        Social History:  reports that he has never smoked. He has never used smokeless tobacco. He reports that he does not drink alcohol or use drugs.    Allergies:  Review of patient's allergies indicates:   Allergen Reactions    Shellfish containing products     Augmentin [amoxicillin-pot clavulanate]      Patient felt that it raised BP and requested to have it added as intolerance. Tolerated unasyn and ampicillin.    Ciprofloxacin     Flagyl [metronidazole]     Lisinopril Other (See Comments)     cough    Mysoline [primidone]     Omnicef [cefdinir]        Medications:  Current Outpatient Prescriptions   Medication Sig Dispense Refill    aspirin 81 MG Chew 4 tablets (324 mg total) by Per G Tube route once daily.  0    atorvastatin (LIPITOR) 40 MG tablet 1 tablet (40 mg total) by Per G Tube route once daily. 90 tablet 3    loratadine (CLARITIN) 10 mg tablet Take 10 mg by mouth once daily.      melatonin 10 mg Tab Take by mouth.      mirtazapine (REMERON) 30 MG tablet Take 1 tablet (30 mg total) by mouth every evening. 90 tablet 3    pantoprazole (PROTONIX) 40 MG tablet Take 1 tablet (40 mg total) by mouth once daily. 30 tablet 5    tamsulosin (FLOMAX) 0.4 mg Cap 1 capsule (0.4 mg total) by Per G Tube route once daily. 30 capsule 11     No current facility-administered medications for this visit.        Review of Systems   Constitutional: Negative for chills, diaphoresis, fatigue, fever and unexpected weight change.   HENT: Positive for trouble swallowing. Negative for sore throat.    Respiratory: Negative for cough and shortness of breath.    Cardiovascular: Negative for chest pain and palpitations.   Gastrointestinal: Negative for abdominal pain, constipation, diarrhea, nausea and vomiting.   Skin: Negative for color change and rash.   Neurological: Negative for headaches.   Hematological: Negative for adenopathy. Does not bruise/bleed  "easily.       ECOG Performance Status: 1   Objective:      Vitals:   Vitals:    08/02/18 1117   BP: 104/61   Pulse: 82   Resp: 18   Temp: 97.8 °F (36.6 °C)   TempSrc: Oral   SpO2: 100%   Weight: 61 kg (134 lb 8 oz)   Height: 5' 10" (1.778 m)     BMI: Body mass index is 19.3 kg/m².    Physical Exam   Constitutional: He is oriented to person, place, and time. No distress.   HENT:   Head: Normocephalic and atraumatic.   Mouth/Throat: Oropharynx is clear and moist. No oropharyngeal exudate.   Eyes: Right eye exhibits no discharge. Left eye exhibits no discharge. No scleral icterus.   Neck: Normal range of motion.   Cardiovascular: Normal rate, regular rhythm, normal heart sounds and intact distal pulses.  Exam reveals no gallop and no friction rub.    No murmur heard.  Pulmonary/Chest: Effort normal and breath sounds normal. No respiratory distress. He has no wheezes. He has no rales. He exhibits no tenderness.   Abdominal: Soft. Bowel sounds are normal. He exhibits no distension and no mass. There is no tenderness. There is no rebound.   Musculoskeletal: Normal range of motion. He exhibits no edema or tenderness.   Patient with sebaceous cysts on his back.   Lymphadenopathy:        Head (right side): No submental adenopathy present.        Head (left side): No submental adenopathy present.     He has no cervical adenopathy.     He has no axillary adenopathy.        Right: No inguinal and no supraclavicular adenopathy present.        Left: No inguinal and no supraclavicular adenopathy present.   Neurological: He is alert and oriented to person, place, and time. No cranial nerve deficit. Coordination normal.   Skin: Skin is warm and dry. No rash noted. He is not diaphoretic. No erythema.   Psychiatric: He has a normal mood and affect. His behavior is normal.       Laboratory Data:  Lab Visit on 08/02/2018   Component Date Value Ref Range Status    WBC 08/02/2018 4.06  3.90 - 12.70 K/uL Final    RBC 08/02/2018 3.14* " 4.60 - 6.20 M/uL Final    Hemoglobin 08/02/2018 9.9* 14.0 - 18.0 g/dL Final    Hematocrit 08/02/2018 30.9* 40.0 - 54.0 % Final    MCV 08/02/2018 98  82 - 98 fL Final    MCH 08/02/2018 31.5* 27.0 - 31.0 pg Final    MCHC 08/02/2018 32.0  32.0 - 36.0 g/dL Final    RDW 08/02/2018 15.9* 11.5 - 14.5 % Final    Platelets 08/02/2018 157  150 - 350 K/uL Final    MPV 08/02/2018 11.1  9.2 - 12.9 fL Final    Immature Granulocytes 08/02/2018 0.2  0.0 - 0.5 % Final    Gran # (ANC) 08/02/2018 2.6  1.8 - 7.7 K/uL Final    Immature Grans (Abs) 08/02/2018 0.01  0.00 - 0.04 K/uL Final    Comment: Mild elevation in immature granulocytes is non specific and   can be seen in a variety of conditions including stress response,   acute inflammation, trauma and pregnancy. Correlation with other   laboratory and clinical findings is essential.      Lymph # 08/02/2018 0.8* 1.0 - 4.8 K/uL Final    Mono # 08/02/2018 0.3  0.3 - 1.0 K/uL Final    Eos # 08/02/2018 0.4  0.0 - 0.5 K/uL Final    Baso # 08/02/2018 0.03  0.00 - 0.20 K/uL Final    nRBC 08/02/2018 0  0 /100 WBC Final    Gran% 08/02/2018 64.1  38.0 - 73.0 % Final    Lymph% 08/02/2018 18.7  18.0 - 48.0 % Final    Mono% 08/02/2018 7.4  4.0 - 15.0 % Final    Eosinophil% 08/02/2018 8.9* 0.0 - 8.0 % Final    Basophil% 08/02/2018 0.7  0.0 - 1.9 % Final    Differential Method 08/02/2018 Automated   Final    Ferritin 08/02/2018 462* 20.0 - 300.0 ng/mL Final    Sed Rate 08/02/2018 25* 0 - 10 mm/Hr Final    CRP 08/02/2018 2.7  0.0 - 8.2 mg/L Final    Retic 08/02/2018 0.8  0.4 - 2.0 % Final    Iron 08/02/2018 63  45 - 160 ug/dL Final    Transferrin 08/02/2018 118* 200 - 375 mg/dL Final    TIBC 08/02/2018 175* 250 - 450 ug/dL Final    Saturated Iron 08/02/2018 36  20 - 50 % Final    Sodium 08/02/2018 142  136 - 145 mmol/L Final    Potassium 08/02/2018 4.4  3.5 - 5.1 mmol/L Final    Chloride 08/02/2018 107  95 - 110 mmol/L Final    CO2 08/02/2018 29  23 - 29 mmol/L  Final    Glucose 08/02/2018 105  70 - 110 mg/dL Final    BUN, Bld 08/02/2018 49* 8 - 23 mg/dL Final    Creatinine 08/02/2018 2.4* 0.5 - 1.4 mg/dL Final    Calcium 08/02/2018 9.8  8.7 - 10.5 mg/dL Final    Total Protein 08/02/2018 6.5  6.0 - 8.4 g/dL Final    Albumin 08/02/2018 3.5  3.5 - 5.2 g/dL Final    Total Bilirubin 08/02/2018 0.7  0.1 - 1.0 mg/dL Final    Comment: For infants and newborns, interpretation of results should be based  on gestational age, weight and in agreement with clinical  observations.  Premature Infant recommended reference ranges:  Up to 24 hours.............<8.0 mg/dL  Up to 48 hours............<12.0 mg/dL  3-5 days..................<15.0 mg/dL  6-29 days.................<15.0 mg/dL      Alkaline Phosphatase 08/02/2018 83  55 - 135 U/L Final    AST 08/02/2018 24  10 - 40 U/L Final    ALT 08/02/2018 27  10 - 44 U/L Final    Anion Gap 08/02/2018 6* 8 - 16 mmol/L Final    eGFR if  08/02/2018 30.9* >60 mL/min/1.73 m^2 Final    eGFR if non  08/02/2018 26.7* >60 mL/min/1.73 m^2 Final    Comment: Calculation used to obtain the estimated glomerular filtration  rate (eGFR) is the CKD-EPI equation.            Imaging:  Reviewed      Assessment:       1. CKD (chronic kidney disease) stage 3, GFR 30-59 ml/min    2. B-cell lymphoma, unspecified B-cell lymphoma type, unspecified body region    3. Normocytic anemia           Plan:     B-Cell Lymphoma - The patient was recently diagnosed with a non-hodgkin's lymphoma with involvement in the bone marrow.  -The bone marrow appears sparsely involved and it is unclear at this time if it is causing the patient any detriment.  -Recent CT of the abdomen and CT renal stone study done on 7/19/18 and 7/13/18 respectively failed to show intraabdominal LAD or splenomegaly  -CT chest on 6/15/18 showed no LAD   -Will check lab work in a month to reassess including CBC, CMP, LDH, uric acid.     Normocytic Anemia - The patient  has a history of anemia for some time  -The patient has had a normal LDH ruling out hemolysis.  -Nutritional, viral studies, SPEP, TSH, and hemolysis panel unremarkable  -In the recent past the patient has had a history of multiple causes of blood loss anemia.  -Recently in the hospital the patient had hematuria and a hematoma  -His hemoglobin continues to improve since his hospitalization and is now at 9.9g/dL.  -Will continue to monitor.    ANNA on CKD - the patient's creatinine has increased to 2.4 from 1.3 on 7/13/18  -BUN to creatinine ratio suggests prerenal ANNA  -Baseline creatinine has ranged from 1.3 to 3.0 in the past 3 months.  -Pt encouraged to increase intake of water from PEG flushes.  -Will refer the patient to a nutritionist for further recommendations on tube feeds and water intake.    -Discussed with Dr Melton.    López Go MD PGY-IV  Hematology and Oncology  Pager:[phone]    Distress Screening Results: Psychosocial Distress screening score of Distress Score: 4 noted and reviewed. No intervention indicated.

## 2018-08-15 ENCOUNTER — TELEPHONE (OUTPATIENT)
Dept: RADIOLOGY | Facility: HOSPITAL | Age: 68
End: 2018-08-15

## 2018-08-16 ENCOUNTER — HOSPITAL ENCOUNTER (OUTPATIENT)
Dept: RADIOLOGY | Facility: HOSPITAL | Age: 68
Discharge: HOME | End: 2018-08-16
Attending: FAMILY MEDICINE
Payer: MEDICARE

## 2018-08-16 ENCOUNTER — CLINICAL SUPPORT (OUTPATIENT)
Dept: SPEECH THERAPY | Facility: HOSPITAL | Age: 68
End: 2018-08-16
Attending: FAMILY MEDICINE
Payer: MEDICARE

## 2018-08-16 ENCOUNTER — TELEPHONE (OUTPATIENT)
Dept: FAMILY MEDICINE | Facility: CLINIC | Age: 68
End: 2018-08-16

## 2018-08-16 DIAGNOSIS — R13.10 DYSPHAGIA, UNSPECIFIED TYPE: ICD-10-CM

## 2018-08-16 PROCEDURE — 74230 X-RAY XM SWLNG FUNCJ C+: CPT | Mod: 26,,, | Performed by: RADIOLOGY

## 2018-08-16 PROCEDURE — 74230 X-RAY XM SWLNG FUNCJ C+: CPT | Mod: TC

## 2018-08-16 PROCEDURE — G8998 SWALLOW D/C STATUS: HCPCS | Mod: GN,CJ | Performed by: SPEECH-LANGUAGE PATHOLOGIST

## 2018-08-16 PROCEDURE — 92611 MOTION FLUOROSCOPY/SWALLOW: CPT | Mod: GN | Performed by: SPEECH-LANGUAGE PATHOLOGIST

## 2018-08-16 PROCEDURE — G8997 SWALLOW GOAL STATUS: HCPCS | Mod: GN,CI | Performed by: SPEECH-LANGUAGE PATHOLOGIST

## 2018-08-16 PROCEDURE — G8996 SWALLOW CURRENT STATUS: HCPCS | Mod: GN,CJ | Performed by: SPEECH-LANGUAGE PATHOLOGIST

## 2018-08-16 NOTE — TELEPHONE ENCOUNTER
I left message for the pt to rtn call  Tomorrow to schedule office visit next week with dr shepherd to have peg tube  removed

## 2018-08-16 NOTE — TELEPHONE ENCOUNTER
----- Message from Linda Mcneal sent at 8/16/2018  3:16 PM CDT -----  Contact: wife Lupe [phone]  Patient's spouse calling in regarding getting his feeding tube removed. Please call and advise.    I should advise her to call GI doc?

## 2018-08-16 NOTE — PROGRESS NOTES
Referring provider: Dr. Deric Claudio  Reason for visit:  Modified barium swallow study (CPT 09798)    Report Summary   --Date: 08/16/2018  --Purpose: to evaluate anatomy and physiology of the oropharyngeal swallow, to determine effectiveness of rehabilitation strategies, and to determine diet consistency and intervention recommendations.   --Diet recommendations: regular as tolerated w/ strategies discussed (chin tuck with dry swallow after 3 sips/bites; embed pills in applesauce)    Subjective / History    Rodolfo Messina is a 68 y.o. male referred by Dr. Claudio for Modified Barium Swallow Study (51835).  He is currently eating a PO diet largely w/o restriction, but his PEG is in place.  He reports at this time he only takes 300cc of water via the PEG daily.  Otherwise he is gaining weight with a PO diet.  He feels well and reports he has been afebrile.  He reports an SLP has been coming to his house and working on swallowing therapy.  He reports he had started with just small bites of pudding, but at this time has had fish, sandwiches, eggs cereal, and many sides w/o issues swallowing.  He has been getting cardiac rehab as well.  He has been taking his medications PO.    He has several recent admissions to the hospital for MVR, dyspnea associated with anemia, and blood in urine.  During his admissions he had speech therapy and underwent 4 MBSS.  MBSS 3/27: recs for regular/thin liq  MBSS 5/29: recs --  IF ABLE TO COMPLETE EACH SWALLOW WITH CHIN TUCK & 3 SWALLOWS PER BOLUS, Puree & nectar thickened liquid diet may be initiated with caution If unable to complete these strategies with ALL PO intake, SLP recs NPO, temporary alternative means of intake, puree & nectar thick for pleasure with above strategies, dysphagia tx & repeat MBSS at the end of week   --(no aspiration noted)  MBSS 6/4: NPO; Puree & honey thick liquids ok for pleasure with 2-3 swallows per bolus, strict aspiration precautions listed below &  discontinue PO if ANY s/s aspiration (coughing, throat clearing, wet vocal quality)  --1 episode of aspiration of NTL documented  MBSS 7/3: NPO, Pleasure Feeding, NPO. For pleasure, pt appears safe for pureed solids via 1/2 tsp x3-5 MAX, 3-5x/ day with consistent implementation of all the following aspiration precautions  --1 episode of aspiration of NTL documented    Past Medical History:   Diagnosis Date    ANNA (acute kidney injury) 2/22/2018    Anemia     Anxiety     Arthritis     BPH (benign prostatic hyperplasia)     CHF (congestive heart failure) 5/23/2018    Coarse tremors     Coronary artery disease of native artery of native heart with stable angina pectoris 5/21/2018    Diverticulosis     Encounter for blood transfusion     Endocarditis     Hypertension     Non Hodgkin's lymphoma 8/2/2018    Urinary retention      Current Outpatient Medications on File Prior to Visit   Medication Sig Dispense Refill    aspirin 81 MG Chew 4 tablets (324 mg total) by Per G Tube route once daily.  0    atorvastatin (LIPITOR) 40 MG tablet 1 tablet (40 mg total) by Per G Tube route once daily. 90 tablet 3    loratadine (CLARITIN) 10 mg tablet Take 10 mg by mouth once daily.      melatonin 10 mg Tab Take by mouth.      mirtazapine (REMERON) 30 MG tablet Take 1 tablet (30 mg total) by mouth every evening. 90 tablet 3    pantoprazole (PROTONIX) 40 MG tablet Take 1 tablet (40 mg total) by mouth once daily. 30 tablet 5    tamsulosin (FLOMAX) 0.4 mg Cap 1 capsule (0.4 mg total) by Per G Tube route once daily. 30 capsule 11     No current facility-administered medications on file prior to visit.        Objective   Lateral videofluorographic views were obtained. The radiologist and speech pathologist were present for the entire procedure.     Consistencies assessed / method of administration  Thin liquid/4 mL  Thin liquid/6 mL  Thin liquid/cup sip  Thin liquid/sequential cup sips  Applesauce/tsp  Cracker  Barium  tablet w/ water heath    Oral phase  Adequate lip closure, adequate tongue control during bolus hold, timely bolus preparation, smooth/timely bolus transport/lingual motion, mild posterior oral residue.     Pharyngeal phase.  Adequate soft palate elevation.  Overall reduced/intermittently absent epiglottic inversion resulting in vallecular residue.  Very mild pyriform residue noted intermittently, also cleared w repeat swallow.   THIN LIQUID, <4 ML. Timely clearance, mild pyriform residue.  Cleared w repeat swallow.    THIN LIQUID, 7ML.  Penetration, cleared promptly.  Mild vallecular residue, cleared w chin tuck + dry swallow.   THIN LIQUID, CUP SIP(S).  Penetration, cleared promptly.  Vallecular residue, cleared w chin tuck + dry swallow.    APPLESAUCE. Timely clearance. Increased vallecular residue, cleared w chin tuck + dry swallow.   CRACKER. Timely clearance.  Increased vallecular residue, cleared w chin tuck + dry swallow.   TABLET W/ APPLESAUCE. Timely clearance of tablet with vallecular residue of applesauce.  Cleared with chin tuck+dry swallow.     Strategies/therapeutic interventions attempted  Chin tuck after swallow. Strategies were effective in decreasing/eliminating risk for vallecular/pharyngeal residue.     Rosenbek Penetration-Aspiration Scale (Rosenbek et al 1996)  Best Trial: 1. Contrast does not enter airway.   Worst Trial: 2. Contrast enters airway but remains above TVF, no residue.      Assessment / Impressions   The results of this MBSS indicate that patient demonstrates mild swallowing dysfunction c/b silent penetration of thin liquid consistencies intermittently during the swallow.  Prognosis for improvement of swallowing with therapy is good; MBSS today compared to chart review of previous indicates pt has made progress in swallowing function.      Recommendations / POC   -Diet: recommend regular as tolerated w/ strategies discussed (chin tuck with dry swallow after every 3 sips/bites;  embed pills in applesauce)  -Therapeutic technique: recommend chin tuck and multiple swallows per bolus   -Dysphagia therapy (continue home health), for 2-6 sessions over the course of 1-6 weeks, in order to increase pharyngeal contraction, increase laryngeal excursion and airway closure and increase swallow safety by implementing therapeutic maneuvers.  Goals: Pt will be independent at using chin tuck w/ dry swallow after every 3 boluses.  Pt will demonstrate correct use of embedding pills in applesauce/puree.  Pt will independently demonstrate use of small bites/small sips.    -Contact clinician or referring provider for repeat MBSS if swallowing status changes or worsens    G-Codes for Swallowing  Current status:  - CJ  Projected status:   - CI  Discharge status:   -  CJ

## 2018-08-17 ENCOUNTER — OFFICE VISIT (OUTPATIENT)
Dept: FAMILY MEDICINE | Facility: CLINIC | Age: 68
End: 2018-08-17
Payer: MEDICARE

## 2018-08-17 ENCOUNTER — TELEPHONE (OUTPATIENT)
Dept: CARDIOTHORACIC SURGERY | Facility: CLINIC | Age: 68
End: 2018-08-17

## 2018-08-17 VITALS
HEART RATE: 92 BPM | SYSTOLIC BLOOD PRESSURE: 104 MMHG | BODY MASS INDEX: 20.79 KG/M2 | HEIGHT: 70 IN | TEMPERATURE: 98 F | WEIGHT: 145.19 LBS | DIASTOLIC BLOOD PRESSURE: 58 MMHG | OXYGEN SATURATION: 99 %

## 2018-08-17 DIAGNOSIS — T78.3XXA ANGIOEDEMA, INITIAL ENCOUNTER: ICD-10-CM

## 2018-08-17 DIAGNOSIS — R13.10 DYSPHAGIA, UNSPECIFIED TYPE: Primary | ICD-10-CM

## 2018-08-17 PROBLEM — Z78.9 ON ENTERAL NUTRITION: Status: RESOLVED | Noted: 2018-06-04 | Resolved: 2018-08-17

## 2018-08-17 PROCEDURE — 3074F SYST BP LT 130 MM HG: CPT | Mod: CPTII,S$GLB,, | Performed by: FAMILY MEDICINE

## 2018-08-17 PROCEDURE — 3078F DIAST BP <80 MM HG: CPT | Mod: CPTII,S$GLB,, | Performed by: FAMILY MEDICINE

## 2018-08-17 PROCEDURE — 99213 OFFICE O/P EST LOW 20 MIN: CPT | Mod: S$GLB,,, | Performed by: FAMILY MEDICINE

## 2018-08-17 NOTE — PROGRESS NOTES
Chief Complaint  Chief Complaint   Patient presents with    peg tube removal    Facial Swelling       HPI  Rodolfo Messina is a 68 y.o. male with multiple medical diagnoses as listed in the medical history and problem list that presents for facial swelling and to have his J-tube removed.      Mr Messina repots mild swelling of his face for a few hours this morning.  He is not taking an ace inhibitor and he suspects that lipitor may have caused it.  He reports that it was just mild puffiness around his lips.  No dysphagia, tongue swelling or SOB.  He reports that he did eat at [employer] just before the swelling occurred.  He took benadryl and it resolved.    Patient had a feeding tube placed while he was hospitalized after a CABG.  He apparently developed dysphagia and was unable to eat.  His most recent swallow evaluation showed improvement and he no longer has need of the J-tube.  He wants me to take it out.  .      PAST MEDICAL HISTORY:  Past Medical History:   Diagnosis Date    ANNA (acute kidney injury) 2/22/2018    Anemia     Anxiety     Arthritis     BPH (benign prostatic hyperplasia)     CHF (congestive heart failure) 5/23/2018    Coarse tremors     Coronary artery disease of native artery of native heart with stable angina pectoris 5/21/2018    Diverticulosis     Encounter for blood transfusion     Endocarditis     Hypertension     Non Hodgkin's lymphoma 8/2/2018    Urinary retention        PAST SURGICAL HISTORY:  Past Surgical History:   Procedure Laterality Date    APPENDECTOMY      COLONOSCOPY      EYE SURGERY Right     cataract extraction    FINGER SURGERY      FRACTURE SURGERY Right     index finger    TONSILLECTOMY         SOCIAL HISTORY:  Social History     Socioeconomic History    Marital status:      Spouse name: Not on file    Number of children: Not on file    Years of education: Not on file    Highest education level: Not on file   Social Needs    Financial  resource strain: Not on file    Food insecurity - worry: Not on file    Food insecurity - inability: Not on file    Transportation needs - medical: Not on file    Transportation needs - non-medical: Not on file   Occupational History     Employer: [employer]   Tobacco Use    Smoking status: Never Smoker    Smokeless tobacco: Never Used   Substance and Sexual Activity    Alcohol use: No     Comment: none recently    Drug use: No    Sexual activity: Not Currently     Partners: Female   Other Topics Concern    Not on file   Social History Narrative    Not on file       FAMILY HISTORY:  Family History   Problem Relation Age of Onset    Stroke Mother     Heart disease Mother         CABG    Heart disease Father         CABG    No Known Problems Sister     No Known Problems Brother        ALLERGIES AND MEDICATIONS: updated and reviewed.  Review of patient's allergies indicates:   Allergen Reactions    Metoprolol Diarrhea    Shellfish containing products     Augmentin [amoxicillin-pot clavulanate]      Patient felt that it raised BP and requested to have it added as intolerance. Tolerated unasyn and ampicillin.    Ciprofloxacin     Flagyl [metronidazole]     Lisinopril Other (See Comments)     cough    Mysoline [primidone]     Omnicef [cefdinir]      Current Outpatient Medications   Medication Sig Dispense Refill    aspirin 81 MG Chew 4 tablets (324 mg total) by Per G Tube route once daily.  0    atorvastatin (LIPITOR) 40 MG tablet 1 tablet (40 mg total) by Per G Tube route once daily. 90 tablet 3    loratadine (CLARITIN) 10 mg tablet Take 10 mg by mouth once daily.      melatonin 10 mg Tab Take by mouth.      mirtazapine (REMERON) 30 MG tablet Take 1 tablet (30 mg total) by mouth every evening. 90 tablet 3    pantoprazole (PROTONIX) 40 MG tablet Take 1 tablet (40 mg total) by mouth once daily. 30 tablet 5    tamsulosin (FLOMAX) 0.4 mg Cap 1 capsule (0.4 mg total) by Per G Tube route once daily.  "30 capsule 11     No current facility-administered medications for this visit.          ROS  Review of Systems   Constitutional: Negative for activity change, appetite change, chills and fatigue.   HENT: Negative for congestion, ear discharge, hearing loss, mouth sores, rhinorrhea, sinus pain and trouble swallowing.    Eyes: Negative for photophobia, pain, discharge and visual disturbance.   Respiratory: Negative for cough, chest tightness, shortness of breath and wheezing.    Cardiovascular: Negative for chest pain, palpitations and leg swelling.   Gastrointestinal: Negative for abdominal distention, abdominal pain, blood in stool, constipation, diarrhea, nausea and vomiting.   Genitourinary: Negative for difficulty urinating, dysuria and flank pain.   Musculoskeletal: Negative for arthralgias, back pain, gait problem, joint swelling and myalgias.   Skin: Negative for color change and rash.   Neurological: Negative for dizziness, tremors, speech difficulty, light-headedness, numbness and headaches.   Psychiatric/Behavioral: Negative for behavioral problems, confusion, hallucinations, sleep disturbance and suicidal ideas.           PHYSICAL EXAM  Vitals:    08/17/18 1543   BP: (!) 104/58   Pulse: 92   Temp: 98.3 °F (36.8 °C)   SpO2: 99%   Weight: 65.9 kg (145 lb 3.2 oz)   Height: 5' 10" (1.778 m)    Body mass index is 20.83 kg/m².  Weight: 65.9 kg (145 lb 3.2 oz)   Height: 5' 10" (177.8 cm)     Physical Exam   Constitutional: He appears well-developed.   HENT:   Head: Normocephalic.   Eyes: Conjunctivae are normal.   Neck: Normal range of motion. Neck supple.   Cardiovascular: Normal rate and regular rhythm.   Pulmonary/Chest: Effort normal and breath sounds normal.   Abdominal: Soft. Bowel sounds are normal. He exhibits no distension. There is no tenderness.   J-tube in place.    Neurological: He is alert.   Skin: Skin is warm.   Psychiatric: He has a normal mood and affect.   Nursing note and vitals " reviewed.        Health Maintenance       Date Due Completion Date    Influenza Vaccine 08/01/2018 10/13/2017 (Declined)    Override on 10/13/2017: Declined    Override on 8/29/2016: Declined    Fecal Occult Blood Test (FOBT)/[facility] 07/08/2019 7/8/2018    Override on 6/15/2017: Done (negative)    High Dose Statin 07/31/2019 7/31/2018    Lipid Panel 06/13/2023 6/13/2018    TETANUS VACCINE 08/24/2026 8/24/2016            Assessment & Plan      Dysphagia, unspecified type   - Resolved.  J-tube removed today without difficulty  Angioedema, initial encounter   - Resolved.  I think and allergic reaction is the most likely cause.  Will monitor closely and patient will call if swelling recurs.           Follow-up: No Follow-up on file.

## 2018-08-17 NOTE — PROCEDURES
Procedures:  Patient was positioned on the table in a supine position.  10 cc of clear fluid was aspirated from the J-tube balloon.  The tube was then removed fully intact and without difficulty.  A clear dry dressing was placed over the site.  There was mild redness surrounding the site.  Patient tolerated the procedure well.

## 2018-08-17 NOTE — TELEPHONE ENCOUNTER
Message   Received: Today   Message Contents   Kari ACUNA [facility] Staff   Caller: Unspecified (Today,  9:49 AM)             Patient's wife, Lupe, called.   No. [phone]    Patient needs to have the peg tube removed.   Also, patient's face is swollen.    Please call.        Can I add him today?

## 2018-08-17 NOTE — TELEPHONE ENCOUNTER
Spoke to pt's wife Lupe. Explained to her that questions/issues regarding pt's PEG tube should be directed towards pt's PCP. Lupe verbalized understanding.   ----- Message from Katarina Portillo sent at 8/17/2018  9:01 AM CDT -----  Contact: lupe  [phone]//caller states that she needs to speak with nurse in ref to who will remove pts peg tube//please call//thank you

## 2018-08-17 NOTE — TELEPHONE ENCOUNTER
I left message for the pt on both phone #'s  Advising if they could get here before 1215 then dr shepherd   Could see him today   As dr shepherd will not be in the office this afternoon  If after 1215 I advised they need to rtn my call

## 2018-08-28 ENCOUNTER — OFFICE VISIT (OUTPATIENT)
Dept: CARDIOLOGY | Facility: CLINIC | Age: 68
End: 2018-08-28
Payer: MEDICARE

## 2018-08-28 VITALS
SYSTOLIC BLOOD PRESSURE: 134 MMHG | HEART RATE: 80 BPM | HEIGHT: 70 IN | WEIGHT: 151.81 LBS | OXYGEN SATURATION: 98 % | BODY MASS INDEX: 21.73 KG/M2 | DIASTOLIC BLOOD PRESSURE: 72 MMHG

## 2018-08-28 DIAGNOSIS — I10 ESSENTIAL HYPERTENSION: Primary | Chronic | ICD-10-CM

## 2018-08-28 DIAGNOSIS — N18.30 CKD (CHRONIC KIDNEY DISEASE) STAGE 3, GFR 30-59 ML/MIN: ICD-10-CM

## 2018-08-28 DIAGNOSIS — I33.0 SUBACUTE BACTERIAL ENDOCARDITIS: ICD-10-CM

## 2018-08-28 DIAGNOSIS — Z86.79 H/O ATRIAL FLUTTER: ICD-10-CM

## 2018-08-28 DIAGNOSIS — D64.9 NORMOCYTIC ANEMIA: ICD-10-CM

## 2018-08-28 PROCEDURE — 3078F DIAST BP <80 MM HG: CPT | Mod: CPTII,S$GLB,, | Performed by: INTERNAL MEDICINE

## 2018-08-28 PROCEDURE — 99999 PR PBB SHADOW E&M-EST. PATIENT-LVL III: CPT | Mod: PBBFAC,,, | Performed by: INTERNAL MEDICINE

## 2018-08-28 PROCEDURE — 99499 UNLISTED E&M SERVICE: CPT | Mod: HCNC,S$GLB,, | Performed by: INTERNAL MEDICINE

## 2018-08-28 PROCEDURE — 99214 OFFICE O/P EST MOD 30 MIN: CPT | Mod: S$GLB,,, | Performed by: INTERNAL MEDICINE

## 2018-08-28 PROCEDURE — 3075F SYST BP GE 130 - 139MM HG: CPT | Mod: CPTII,S$GLB,, | Performed by: INTERNAL MEDICINE

## 2018-08-28 RX ORDER — AZITHROMYCIN 500 MG/1
500 TABLET, FILM COATED ORAL ONCE
Qty: 4 TABLET | Refills: 11 | Status: SHIPPED | OUTPATIENT
Start: 2018-08-28 | End: 2018-08-28

## 2018-08-28 NOTE — PROGRESS NOTES
"  Subjective:      Patient ID: Rodolfo Messina is a 68 y.o. male.    Chief Complaint: Follow-up    HPI:  Pt is eating fine.  Passed swallowing test.  Tolerates regular food and thin liquids.    Review of Systems   Cardiovascular: Positive for chest pain (Pt feels mild anterior chest discomfort with deep inspiration). Negative for claudication, dyspnea on exertion, irregular heartbeat, leg swelling, near-syncope, orthopnea, palpitations and syncope.    Right breast is tender to touch since surgery.  Going to cardiac rehab in [place].    Hematologist dx B cell lymphoma  Pt states Augmentin caused blood pressure to shoot up and "almost caused pt to die.  Past Medical History:   Diagnosis Date    ANNA (acute kidney injury) 2/22/2018    Anemia     Anxiety     Arthritis     BPH (benign prostatic hyperplasia)     CHF (congestive heart failure) 5/23/2018    Coarse tremors     Coronary artery disease of native artery of native heart with stable angina pectoris 5/21/2018    Diverticulosis     Encounter for blood transfusion     Endocarditis     Hypertension     Non Hodgkin's lymphoma 8/2/2018    Urinary retention         Past Surgical History:   Procedure Laterality Date    APPENDECTOMY      COLONOSCOPY      EYE SURGERY Right     cataract extraction    FINGER SURGERY      FRACTURE SURGERY Right     index finger    TONSILLECTOMY         Family History   Problem Relation Age of Onset    Stroke Mother     Heart disease Mother         CABG    Heart disease Father         CABG    No Known Problems Sister     No Known Problems Brother        Social History     Socioeconomic History    Marital status:      Spouse name: None    Number of children: None    Years of education: None    Highest education level: None   Social Needs    Financial resource strain: None    Food insecurity - worry: None    Food insecurity - inability: None    Transportation needs - medical: None    Transportation needs " "- non-medical: None   Occupational History     Employer: [employer]   Tobacco Use    Smoking status: Never Smoker    Smokeless tobacco: Never Used   Substance and Sexual Activity    Alcohol use: No     Comment: none recently    Drug use: No    Sexual activity: Not Currently     Partners: Female   Other Topics Concern    None   Social History Narrative    None       Current Outpatient Medications on File Prior to Visit   Medication Sig Dispense Refill    aspirin 81 MG Chew 4 tablets (324 mg total) by Per G Tube route once daily.  0    atorvastatin (LIPITOR) 40 MG tablet 1 tablet (40 mg total) by Per G Tube route once daily. 90 tablet 3    loratadine (CLARITIN) 10 mg tablet Take 10 mg by mouth once daily.      melatonin 10 mg Tab Take by mouth.      mirtazapine (REMERON) 30 MG tablet Take 1 tablet (30 mg total) by mouth every evening. 90 tablet 3    pantoprazole (PROTONIX) 40 MG tablet Take 1 tablet (40 mg total) by mouth once daily. 30 tablet 5    tamsulosin (FLOMAX) 0.4 mg Cap 1 capsule (0.4 mg total) by Per G Tube route once daily. 30 capsule 11     No current facility-administered medications on file prior to visit.        Review of patient's allergies indicates:   Allergen Reactions    Metoprolol Diarrhea    Shellfish containing products     Augmentin [amoxicillin-pot clavulanate]      Patient felt that it raised BP and requested to have it added as intolerance. Tolerated unasyn and ampicillin.    Ciprofloxacin     Flagyl [metronidazole]     Lisinopril Other (See Comments)     cough    Mysoline [primidone]     Omnicef [cefdinir]      Objective:     Vitals:    08/28/18 1632   BP: 134/72   BP Location: Left arm   Patient Position: Sitting   BP Method: Medium (Automatic)   Pulse: 80   SpO2: 98%   Weight: 68.9 kg (151 lb 12.8 oz)   Height: 5' 10" (1.778 m)        Physical Exam   Constitutional: He is oriented to person, place, and time. He appears well-developed and well-nourished. No distress. "   Eyes: No scleral icterus.   Neck: No JVD present. Carotid bruit is not present.   Cardiovascular: Regular rhythm and normal heart sounds. Exam reveals no gallop and no friction rub.   No murmur heard.  Pulmonary/Chest: Effort normal and breath sounds normal. No respiratory distress.   Musculoskeletal: He exhibits no edema.   Neurological: He is alert and oriented to person, place, and time.   Skin: Skin is warm and dry. He is not diaphoretic.   Psychiatric: He has a normal mood and affect. His behavior is normal. Judgment and thought content normal.   Vitals reviewed.     Wt up 15 lbs    Creat 2.4  BUN 49  Alb now normal    CT of abd last month did not show hydronephrosis  Assessment:     1. Essential hypertension    2. Subacute bacterial endocarditis    3. H/O atrial flutter    4. Normocytic anemia      Plan:   Rodolfo was seen today for follow-up.    Diagnoses and all orders for this visit:    Essential hypertension    Subacute bacterial endocarditis    H/O atrial flutter    Normocytic anemia    Other orders  -     azithromycin (ZITHROMAX) 500 MG tablet; Take 1 tablet (500 mg total) by mouth once. One tablet one hour before dental work. for 1 dose       Azithromycin 500 mg one hour before dental work.  Increase oral fluids--double current fluid intake.  Consult nephrologist  F/u with hematologist for B cell lymphoma    No Follow-up on file.

## 2018-08-30 ENCOUNTER — OFFICE VISIT (OUTPATIENT)
Dept: HEMATOLOGY/ONCOLOGY | Facility: CLINIC | Age: 68
End: 2018-08-30
Payer: MEDICARE

## 2018-08-30 ENCOUNTER — LAB VISIT (OUTPATIENT)
Dept: LAB | Facility: HOSPITAL | Age: 68
End: 2018-08-30
Payer: MEDICARE

## 2018-08-30 VITALS
HEIGHT: 70 IN | TEMPERATURE: 98 F | HEART RATE: 81 BPM | SYSTOLIC BLOOD PRESSURE: 151 MMHG | OXYGEN SATURATION: 99 % | RESPIRATION RATE: 18 BRPM | DIASTOLIC BLOOD PRESSURE: 72 MMHG | WEIGHT: 149.88 LBS | BODY MASS INDEX: 21.46 KG/M2

## 2018-08-30 DIAGNOSIS — C85.10 B-CELL LYMPHOMA, UNSPECIFIED B-CELL LYMPHOMA TYPE, UNSPECIFIED BODY REGION: Primary | ICD-10-CM

## 2018-08-30 DIAGNOSIS — D64.9 NORMOCYTIC ANEMIA: ICD-10-CM

## 2018-08-30 DIAGNOSIS — C85.10 B-CELL LYMPHOMA, UNSPECIFIED B-CELL LYMPHOMA TYPE, UNSPECIFIED BODY REGION: ICD-10-CM

## 2018-08-30 LAB
ALBUMIN SERPL BCP-MCNC: 3.3 G/DL
ALP SERPL-CCNC: 88 U/L
ALT SERPL W/O P-5'-P-CCNC: 14 U/L
ANION GAP SERPL CALC-SCNC: 6 MMOL/L
AST SERPL-CCNC: 15 U/L
BASOPHILS # BLD AUTO: 0.02 K/UL
BASOPHILS NFR BLD: 0.5 %
BILIRUB SERPL-MCNC: 0.7 MG/DL
BUN SERPL-MCNC: 20 MG/DL
CALCIUM SERPL-MCNC: 9.3 MG/DL
CHLORIDE SERPL-SCNC: 113 MMOL/L
CO2 SERPL-SCNC: 26 MMOL/L
CREAT SERPL-MCNC: 1.5 MG/DL
DIFFERENTIAL METHOD: ABNORMAL
EOSINOPHIL # BLD AUTO: 0.4 K/UL
EOSINOPHIL NFR BLD: 10.3 %
ERYTHROCYTE [DISTWIDTH] IN BLOOD BY AUTOMATED COUNT: 15.5 %
EST. GFR  (AFRICAN AMERICAN): 54.5 ML/MIN/1.73 M^2
EST. GFR  (NON AFRICAN AMERICAN): 47.2 ML/MIN/1.73 M^2
GLUCOSE SERPL-MCNC: 96 MG/DL
HCT VFR BLD AUTO: 27.7 %
HGB BLD-MCNC: 8.5 G/DL
IMM GRANULOCYTES # BLD AUTO: 0.02 K/UL
IMM GRANULOCYTES NFR BLD AUTO: 0.5 %
LDH SERPL L TO P-CCNC: 176 U/L
LYMPHOCYTES # BLD AUTO: 0.8 K/UL
LYMPHOCYTES NFR BLD: 20.1 %
MCH RBC QN AUTO: 31.3 PG
MCHC RBC AUTO-ENTMCNC: 30.7 G/DL
MCV RBC AUTO: 102 FL
MONOCYTES # BLD AUTO: 0.4 K/UL
MONOCYTES NFR BLD: 10.3 %
NEUTROPHILS # BLD AUTO: 2.3 K/UL
NEUTROPHILS NFR BLD: 58.3 %
NRBC BLD-RTO: 0 /100 WBC
PLATELET # BLD AUTO: 136 K/UL
PMV BLD AUTO: 10.5 FL
POTASSIUM SERPL-SCNC: 4.7 MMOL/L
PROT SERPL-MCNC: 6.1 G/DL
RBC # BLD AUTO: 2.72 M/UL
SODIUM SERPL-SCNC: 145 MMOL/L
URATE SERPL-MCNC: 6.5 MG/DL
WBC # BLD AUTO: 3.88 K/UL

## 2018-08-30 PROCEDURE — 99999 PR PBB SHADOW E&M-EST. PATIENT-LVL III: CPT | Mod: PBBFAC,GC,,

## 2018-08-30 PROCEDURE — 3077F SYST BP >= 140 MM HG: CPT | Mod: CPTII,GC,S$GLB, | Performed by: INTERNAL MEDICINE

## 2018-08-30 PROCEDURE — 85025 COMPLETE CBC W/AUTO DIFF WBC: CPT

## 2018-08-30 PROCEDURE — 3078F DIAST BP <80 MM HG: CPT | Mod: CPTII,GC,S$GLB, | Performed by: INTERNAL MEDICINE

## 2018-08-30 PROCEDURE — 83615 LACTATE (LD) (LDH) ENZYME: CPT

## 2018-08-30 PROCEDURE — 36415 COLL VENOUS BLD VENIPUNCTURE: CPT

## 2018-08-30 PROCEDURE — 99214 OFFICE O/P EST MOD 30 MIN: CPT | Mod: GC,S$GLB,, | Performed by: INTERNAL MEDICINE

## 2018-08-30 PROCEDURE — 84550 ASSAY OF BLOOD/URIC ACID: CPT

## 2018-08-30 PROCEDURE — 80053 COMPREHEN METABOLIC PANEL: CPT

## 2018-08-30 NOTE — PROGRESS NOTES
Distress Screening Results: Psychosocial Distress screening score of Distress Score: 2 not completed. Not appropriate for completion at this visit.

## 2018-08-30 NOTE — PROGRESS NOTES
PATIENT: Rodolfo Messina  MRN: [id number]  DATE: 8/30/2018      Diagnosis:   1. B-cell lymphoma, unspecified B-cell lymphoma type, unspecified body region    2. Normocytic anemia        Chief Complaint: No chief complaint on file.      Oncologic History:      Oncologic History     Oncologic Treatment     Pathology       Subjective:    Interval History: Mr. Messina is a 68 y.o. male who presents for follow up for low grade lymphoma.  Since the last clinic visit the patient's G-tube was removed and the patient states he has been eating plenty by mouth and has been gaining weight.  The patient denies fever, chills, night sweats, weight loss, new lumps or bumps, easy bruising or bleeding.  The patient states he is able to do all of his ADL's and even cut the grass in the past week.    Initial History: Pt has a h/o anemia documented in the [facility] since 2015 with decline in 1/2018, HFrEF, MVR 5/2018, CAD s/p CABG 5/2018, dysphagia developed after CABG, chronic lackey who presents to clinic for establishment of care for anemia and recently diagnosed B-cell lymphoma, non hodgkin's lymphoma.  The patient recently was admitted to the hospital from 6/29-7/13  During the admission the patient was seen to have supra therapeutic INR at 4.1 with hemoglobin of 6.6.  The patient was taken off of coumadin on 7/09/18.  GI consulted 7/09/18 for positive FOBT on 7/08/18 and decreasing hemoglobin. CT Renal Stone Study performed on 7/09/18 showed a new 5.2 cm heterogeneous left psoas collection concerning for blood, moderate bilateral pleural effusions, postsurgical change of mitral valve replacement, gastrojejunostomy tube, right renal cyst, bladder diverticulum, diverticulosis, and aortic ectasia and atherosclerosis.  VCE was performed on 7/11/18 and showed no obvious signs of bleeding.  Heme/Onc consulted for persistent and worsening anemia on 7/13/18.  BM biopsy was performed at that time given his chronic history of anemia  and unclear etiology. BM biopsy from 7/13/18 returned positive for low grade B-cell lymphoma.  The patient had been previously worked up for anemia with EGD and colonoscopy on 2/01/18 showing non bleeding erosive gastritis, internal hemorrhoids, and diverticulosis.  In addition the patient had an SPEP checked which was normal and normal B-12 and folate in the past.    Past Medical History:   Past Medical History:   Diagnosis Date    ANNA (acute kidney injury) 2/22/2018    Anemia     Anxiety     Arthritis     BPH (benign prostatic hyperplasia)     CHF (congestive heart failure) 5/23/2018    Coarse tremors     Coronary artery disease of native artery of native heart with stable angina pectoris 5/21/2018    Diverticulosis     Encounter for blood transfusion     Endocarditis     Hypertension     Non Hodgkin's lymphoma 8/2/2018    Urinary retention        Past Surgical HIstory:   Past Surgical History:   Procedure Laterality Date    APPENDECTOMY      COLONOSCOPY      EYE SURGERY Right     cataract extraction    FINGER SURGERY      FRACTURE SURGERY Right     index finger    TONSILLECTOMY         Family History:   Family History   Problem Relation Age of Onset    Stroke Mother     Heart disease Mother         CABG    Heart disease Father         CABG    No Known Problems Sister     No Known Problems Brother        Social History:  reports that  has never smoked. he has never used smokeless tobacco. He reports that he does not drink alcohol or use drugs.    Allergies:  Review of patient's allergies indicates:   Allergen Reactions    Shellfish containing products     Augmentin [amoxicillin-pot clavulanate]      Patient felt that it raised BP and requested to have it added as intolerance. Tolerated unasyn and ampicillin.    Ciprofloxacin     Flagyl [metronidazole]     Lisinopril Other (See Comments)     cough    Mysoline [primidone]     Omnicef [cefdinir]        Medications:  Current Outpatient  "Medications   Medication Sig Dispense Refill    aspirin 81 MG Chew 4 tablets (324 mg total) by Per G Tube route once daily.  0    atorvastatin (LIPITOR) 40 MG tablet 1 tablet (40 mg total) by Per G Tube route once daily. 90 tablet 3    loratadine (CLARITIN) 10 mg tablet Take 10 mg by mouth once daily.      melatonin 10 mg Tab Take by mouth.      mirtazapine (REMERON) 30 MG tablet Take 1 tablet (30 mg total) by mouth every evening. 90 tablet 3    pantoprazole (PROTONIX) 40 MG tablet Take 1 tablet (40 mg total) by mouth once daily. 30 tablet 5    tamsulosin (FLOMAX) 0.4 mg Cap 1 capsule (0.4 mg total) by Per G Tube route once daily. 30 capsule 11     No current facility-administered medications for this visit.        Review of Systems   Constitutional: Negative for chills, diaphoresis, fatigue, fever and unexpected weight change.   HENT: Negative for sore throat and trouble swallowing.    Respiratory: Negative for cough and shortness of breath.    Cardiovascular: Negative for chest pain and palpitations.   Gastrointestinal: Negative for abdominal pain, constipation, diarrhea, nausea and vomiting.   Skin: Negative for color change and rash.   Neurological: Negative for headaches.   Hematological: Negative for adenopathy. Does not bruise/bleed easily.       ECOG Performance Status: 1   Objective:      Vitals:   Vitals:    08/30/18 1125   BP: (!) 151/72   Pulse: 81   Resp: 18   Temp: 98 °F (36.7 °C)   TempSrc: Oral   SpO2: 99%   Weight: 68 kg (149 lb 14.4 oz)   Height: 5' 10" (1.778 m)     BMI: Body mass index is 21.51 kg/m².    Physical Exam   Constitutional: He is oriented to person, place, and time. No distress.   HENT:   Head: Normocephalic and atraumatic.   Mouth/Throat: Oropharynx is clear and moist. No oropharyngeal exudate.   Eyes: Right eye exhibits no discharge. Left eye exhibits no discharge. No scleral icterus.   Neck: Normal range of motion.   Cardiovascular: Normal rate, regular rhythm and intact " distal pulses. Exam reveals no gallop and no friction rub.   No murmur heard.  Pulmonary/Chest: Effort normal and breath sounds normal. No respiratory distress. He has no wheezes. He has no rales. He exhibits no tenderness.   Abdominal: Soft. Bowel sounds are normal. He exhibits no distension and no mass. There is no tenderness. There is no rebound.   Musculoskeletal: Normal range of motion. He exhibits no edema or tenderness.   Lymphadenopathy:        Head (right side): No submental adenopathy present.        Head (left side): No submental adenopathy present.     He has no cervical adenopathy.     He has no axillary adenopathy.        Right: No supraclavicular adenopathy present.        Left: No supraclavicular adenopathy present.   Neurological: He is alert and oriented to person, place, and time. No cranial nerve deficit. Coordination normal.   Skin: Skin is warm and dry. No rash noted. He is not diaphoretic. No erythema.   Psychiatric: He has a normal mood and affect. His behavior is normal.       Laboratory Data:  Lab Visit on 08/30/2018   Component Date Value Ref Range Status    WBC 08/30/2018 3.88* 3.90 - 12.70 K/uL Final    RBC 08/30/2018 2.72* 4.60 - 6.20 M/uL Final    Hemoglobin 08/30/2018 8.5* 14.0 - 18.0 g/dL Final    Hematocrit 08/30/2018 27.7* 40.0 - 54.0 % Final    MCV 08/30/2018 102* 82 - 98 fL Final    MCH 08/30/2018 31.3* 27.0 - 31.0 pg Final    MCHC 08/30/2018 30.7* 32.0 - 36.0 g/dL Final    RDW 08/30/2018 15.5* 11.5 - 14.5 % Final    Platelets 08/30/2018 136* 150 - 350 K/uL Final    MPV 08/30/2018 10.5  9.2 - 12.9 fL Final    Immature Granulocytes 08/30/2018 0.5  0.0 - 0.5 % Final    Gran # (ANC) 08/30/2018 2.3  1.8 - 7.7 K/uL Final    Immature Grans (Abs) 08/30/2018 0.02  0.00 - 0.04 K/uL Final    Comment: Mild elevation in immature granulocytes is non specific and   can be seen in a variety of conditions including stress response,   acute inflammation, trauma and pregnancy.  Correlation with other   laboratory and clinical findings is essential.      Lymph # 08/30/2018 0.8* 1.0 - 4.8 K/uL Final    Mono # 08/30/2018 0.4  0.3 - 1.0 K/uL Final    Eos # 08/30/2018 0.4  0.0 - 0.5 K/uL Final    Baso # 08/30/2018 0.02  0.00 - 0.20 K/uL Final    nRBC 08/30/2018 0  0 /100 WBC Final    Gran% 08/30/2018 58.3  38.0 - 73.0 % Final    Lymph% 08/30/2018 20.1  18.0 - 48.0 % Final    Mono% 08/30/2018 10.3  4.0 - 15.0 % Final    Eosinophil% 08/30/2018 10.3* 0.0 - 8.0 % Final    Basophil% 08/30/2018 0.5  0.0 - 1.9 % Final    Differential Method 08/30/2018 Automated   Final    Sodium 08/30/2018 145  136 - 145 mmol/L Final    Potassium 08/30/2018 4.7  3.5 - 5.1 mmol/L Final    Chloride 08/30/2018 113* 95 - 110 mmol/L Final    CO2 08/30/2018 26  23 - 29 mmol/L Final    Glucose 08/30/2018 96  70 - 110 mg/dL Final    BUN, Bld 08/30/2018 20  8 - 23 mg/dL Final    Creatinine 08/30/2018 1.5* 0.5 - 1.4 mg/dL Final    Calcium 08/30/2018 9.3  8.7 - 10.5 mg/dL Final    Total Protein 08/30/2018 6.1  6.0 - 8.4 g/dL Final    Albumin 08/30/2018 3.3* 3.5 - 5.2 g/dL Final    Total Bilirubin 08/30/2018 0.7  0.1 - 1.0 mg/dL Final    Comment: For infants and newborns, interpretation of results should be based  on gestational age, weight and in agreement with clinical  observations.  Premature Infant recommended reference ranges:  Up to 24 hours.............<8.0 mg/dL  Up to 48 hours............<12.0 mg/dL  3-5 days..................<15.0 mg/dL  6-29 days.................<15.0 mg/dL      Alkaline Phosphatase 08/30/2018 88  55 - 135 U/L Final    AST 08/30/2018 15  10 - 40 U/L Final    ALT 08/30/2018 14  10 - 44 U/L Final    Anion Gap 08/30/2018 6* 8 - 16 mmol/L Final    eGFR if  08/30/2018 54.5* >60 mL/min/1.73 m^2 Final    eGFR if non  08/30/2018 47.2* >60 mL/min/1.73 m^2 Final    Comment: Calculation used to obtain the estimated glomerular filtration  rate (eGFR) is  the CKD-EPI equation.       LD 08/30/2018 176  110 - 260 U/L Final    Results are increased in hemolyzed samples.    Uric Acid 08/30/2018 6.5  3.4 - 7.0 mg/dL Final         Imaging:  Reviewed      Assessment:       1. B-cell lymphoma, unspecified B-cell lymphoma type, unspecified body region    2. Normocytic anemia           Plan:     B-Cell Lymphoma - The patient was recently diagnosed with a non-hodgkin's lymphoma with involvement in the bone marrow via BM biopsy on 7/13/18.  -Will contact Dr. Hang Dhaliwal and have it clarified on how much of the bone marrow was involved with the lymphoma  -Recent CT of the abdomen and CT renal stone study done on 7/19/18 and 7/13/18 respectively failed to show intraabdominal LAD or splenomegaly  -CT chest on 6/15/18 showed no LAD   -The patient's hemoglobin has decreased to 8.5 today  -If the bone marrow has substantial involvement, will consider starting the patient on rituxan.    Normocytic Anemia - The patient's anemia appears to have slightly worsened  -The patient has had a normal LDH ruling out hemolysis.  -Nutritional, viral studies, SPEP, TSH, and hemolysis panel unremarkable  -In the recent past the patient has had a history of multiple causes of blood loss anemia.  -Recently in the hospital the patient had hematuria and a hematoma  -Patient may have anemia of CKD  -Will consider treating the patient with EPO if the BM biopsy does not show substantial involvement by lymphoma.  -Discussed with Dr Melton.    López Go MD PGY-IV  Hematology and Oncology  Pager:[phone]    Distress Screening Results: Psychosocial Distress screening score of Distress Score: 2 noted and reviewed. No intervention indicated.

## 2018-09-04 DIAGNOSIS — C85.10 B-CELL LYMPHOMA, UNSPECIFIED B-CELL LYMPHOMA TYPE, UNSPECIFIED BODY REGION: Primary | ICD-10-CM

## 2018-09-06 RX ORDER — PANTOPRAZOLE SODIUM 40 MG/1
40 TABLET, DELAYED RELEASE ORAL DAILY
Qty: 30 TABLET | Refills: 5 | Status: SHIPPED | OUTPATIENT
Start: 2018-09-06 | End: 2019-02-26

## 2018-09-06 NOTE — TELEPHONE ENCOUNTER
----- Message from Tripp Abdullahi sent at 9/6/2018  1:37 PM CDT -----  Contact: wife [phone]  Patient would like refill of pantoprazole (PROTONIX) 40 MG tablet sent to [facility] - [place], [place] - [address]. Please call and advise.

## 2018-09-10 ENCOUNTER — TELEPHONE (OUTPATIENT)
Dept: HEMATOLOGY/ONCOLOGY | Facility: CLINIC | Age: 68
End: 2018-09-10

## 2018-09-10 NOTE — TELEPHONE ENCOUNTER
I called the patient and left him a message to call me back at [phone].     López Go MD PGY-VI  Hematology and Oncology  Pager:[phone]

## 2018-09-11 ENCOUNTER — OFFICE VISIT (OUTPATIENT)
Dept: NEUROLOGY | Facility: CLINIC | Age: 68
End: 2018-09-11
Payer: MEDICARE

## 2018-09-11 VITALS
HEIGHT: 70 IN | HEART RATE: 79 BPM | SYSTOLIC BLOOD PRESSURE: 143 MMHG | DIASTOLIC BLOOD PRESSURE: 74 MMHG | WEIGHT: 151.88 LBS | BODY MASS INDEX: 21.74 KG/M2

## 2018-09-11 DIAGNOSIS — F41.9 ANXIETY: ICD-10-CM

## 2018-09-11 DIAGNOSIS — R25.1 TREMOR: Primary | Chronic | ICD-10-CM

## 2018-09-11 PROCEDURE — 3078F DIAST BP <80 MM HG: CPT | Mod: CPTII,,, | Performed by: PSYCHIATRY & NEUROLOGY

## 2018-09-11 PROCEDURE — 99213 OFFICE O/P EST LOW 20 MIN: CPT | Mod: PBBFAC | Performed by: PSYCHIATRY & NEUROLOGY

## 2018-09-11 PROCEDURE — 99999 PR PBB SHADOW E&M-EST. PATIENT-LVL III: CPT | Mod: PBBFAC,,, | Performed by: PSYCHIATRY & NEUROLOGY

## 2018-09-11 PROCEDURE — 1101F PT FALLS ASSESS-DOCD LE1/YR: CPT | Mod: CPTII,,, | Performed by: PSYCHIATRY & NEUROLOGY

## 2018-09-11 PROCEDURE — 99214 OFFICE O/P EST MOD 30 MIN: CPT | Mod: S$PBB,,, | Performed by: PSYCHIATRY & NEUROLOGY

## 2018-09-11 PROCEDURE — 3077F SYST BP >= 140 MM HG: CPT | Mod: CPTII,,, | Performed by: PSYCHIATRY & NEUROLOGY

## 2018-09-11 RX ORDER — GABAPENTIN 100 MG/1
100 CAPSULE ORAL 3 TIMES DAILY
Qty: 90 CAPSULE | Refills: 11 | Status: SHIPPED | OUTPATIENT
Start: 2018-09-11 | End: 2019-02-26

## 2018-09-11 NOTE — LETTER
September 11, 2018      Deric Claudio MD  [address] [place] [id number]           [facility]  [address]  [facility] [address]  Phone: [phone]  Fax: [phone]          Patient: Rodolfo Messina   MR Number: [id number]   YOB: 1950   Date of Visit: 9/11/2018       Dear Dr. Deric Claudio:    Thank you for referring Rodolfo Messina to me for evaluation. Attached you will find relevant portions of my assessment and plan of care.    If you have questions, please do not hesitate to call me. I look forward to following Rodolfo Messina along with you.    Sincerely,    Clem Liu MD    Enclosure  CC:  No Recipients    If you would like to receive this communication electronically, please contact [email or web address] or [phone] to request more information on [facility] access.    For providers and/or their staff who would like to refer a patient to [facility], please contact us through our one-stop-shop provider referral line, [facility] , at [phone].    If you feel you have received this communication in error or would no longer like to receive these types of communications, please e-mail [email or web address]

## 2018-09-11 NOTE — PROGRESS NOTES
Name: Rodolfo Messina  MRN: [id number]   CSN: [id number]      Date: 09/11/2018      Chief Complaint / Interval History:  - slowly recovering from the odysey of 58 days in the hospital  - feeding tube out, gaining weight  - memory is good  - left>>right hand tremor and voice is shaky too  - hearing issues      Kidney stones in the past (no TPM)  BP fluctuates down to 90s/60s and metoprolol caused diarrhea (no inderal)  Pre-existing allergy to primidone, unclear cause (no primidone)  Has not tried gabapentin    From July 2018:   - from Nov 2016 last seen  - has had low blood counts, needing multiple transfusions, also found LAD and another vessel closed --> had multiple bypasses in May  - had been losing weight  - tremors now are a bit worse overall, had been on lower remeron from 30 --> 15.  - trouble with using hands especially near the mouth  - has more voice tremor now      From 11/2016:  Since last visit, tremor has gotten better on Mirtazapine. Still causing him trouble with writing but no longer having any embarrassment when going out to eat.  He is sleeping well too on Mirtazapine. Had Right cataract surgery three weeks ago. States he is seeing much better now.       History of Present Illness (HPI):  Epic down, see notes:          Nonmotor/Premotor ROS:  Hyposmia (HENT)?No  RBD/sleep issues (Constitutional)?No  Depression/anxiety (Psychiatric)?Yes multiple life stressors  Fatigue (Constitutional)?No  Constipation (GI)?No  Urinary issues ()?No  Sexual dysfunction ()?No  Orthostasis (Cardiovascular)?No  Leg swelling (Cardiovascular)? No  Falls (Musculoskeletal)?No  Cognitive impairment (Neurologic)?No  Psychoses (Psychiatric)?No  Pain/Paresthesia (Neurologic)?No  Visual changes (Eyes)?No  Moles / skin changes (Skin)?No  Stridor / SOB (Pulm)?No  Bruising (Heme)?No    Past Medical History: The patient  has a past medical history of ANNA (acute kidney injury) (2/22/2018), Anemia, Anxiety, Arthritis, BPH (benign  "prostatic hyperplasia), CHF (congestive heart failure) (5/23/2018), Coarse tremors, Coronary artery disease of native artery of native heart with stable angina pectoris (5/21/2018), Diverticulosis, Encounter for blood transfusion, Endocarditis, Hypertension, Non Hodgkin's lymphoma (8/2/2018), and Urinary retention.    Social History: The patient  reports that  has never smoked. he has never used smokeless tobacco. He reports that he does not drink alcohol or use drugs.    Family History: Their family history includes Heart disease in his father and mother; No Known Problems in his brother and sister; Stroke in his mother.    Allergies: Metoprolol; Shellfish containing products; Augmentin [amoxicillin-pot clavulanate]; Ciprofloxacin; Flagyl [metronidazole]; Lisinopril; Mysoline [primidone]; and Omnicef [cefdinir]     Meds:   Current Outpatient Medications on File Prior to Visit   Medication Sig Dispense Refill    aspirin 81 MG Chew 4 tablets (324 mg total) by Per G Tube route once daily.  0    atorvastatin (LIPITOR) 40 MG tablet 1 tablet (40 mg total) by Per G Tube route once daily. 90 tablet 3    loratadine (CLARITIN) 10 mg tablet Take 10 mg by mouth once daily.      melatonin 10 mg Tab Take by mouth.      mirtazapine (REMERON) 30 MG tablet Take 1 tablet (30 mg total) by mouth every evening. 90 tablet 3    pantoprazole (PROTONIX) 40 MG tablet Take 1 tablet (40 mg total) by mouth once daily. 30 tablet 5    tamsulosin (FLOMAX) 0.4 mg Cap 1 capsule (0.4 mg total) by Per G Tube route once daily. 30 capsule 11     No current facility-administered medications on file prior to visit.        Exam:  BP (!) 143/74   Pulse 79   Ht 5' 10" (1.778 m)   Wt 68.9 kg (151 lb 14.4 oz)   BMI 21.79 kg/m²           * Specialized movement exam  No hypophonic speech.    + Vocal tremor    No facial masking.   No cogwheel rigidity.     No bradykinesia.   + postural/kinetic tremor no resting - also vocal and now a bit more L>R   No " other dystonia, chorea, athetosis, myoclonus, or tics.   No motor impersistence.   Normal-based gait.   No shortened stride length.   No abnormal arm swing.     No postural instability.              Laboratory/Radiological:  - Results:    - Independent review of images: none    Diagnoses:          Essential tremor with bilateral hand and vocal tremor.  See     Medical Decision Making:  - referral to [facility] again (missed appt while in hospital) and query if vocal tremor/dysphonia is contributing to dysphagia?  - trial of gabapentin 100 TID for tremor and anxiety          Clem Liu MD, MPH  Division of Movement and Memory Disorders  [facility]

## 2018-09-18 ENCOUNTER — TELEPHONE (OUTPATIENT)
Dept: FAMILY MEDICINE | Facility: CLINIC | Age: 68
End: 2018-09-18

## 2018-09-18 RX ORDER — AMOXICILLIN 500 MG/1
CAPSULE ORAL
Qty: 8 CAPSULE | Refills: 0 | Status: SHIPPED | OUTPATIENT
Start: 2018-09-18 | End: 2018-09-27

## 2018-09-18 RX ORDER — AZITHROMYCIN 500 MG/1
500 TABLET, FILM COATED ORAL DAILY
Qty: 1 TABLET | Refills: 0 | Status: SHIPPED | OUTPATIENT
Start: 2018-09-18 | End: 2018-09-27

## 2018-09-18 NOTE — TELEPHONE ENCOUNTER
----- Message from Antoine Livingston sent at 9/18/2018  3:17 PM CDT -----  Contact: [facility] [phone]  Pharmacist advised the patient is allergic to penicillin and need an alternate Rx for the amoxicillin (AMOXIL) 500 MG capsule. Please call.

## 2018-09-18 NOTE — TELEPHONE ENCOUNTER
----- Message from Stefani Rocha sent at 9/18/2018 10:25 AM CDT -----  Contact: [phone]/ pts wife   Called requesting to speak with you regarding antibiotic being called in to [facility] [address] [place] [address] [address] [facility].  stated pt recently had open heart surgery and needs an antibiotic to be able to be seen by dentist for toothache he's experiencing.  
I notified the pt wife  I called rx into [facility] and canceled the rx at [facility]  
Prescription for antibiotics sent in take 4 tablets 30 min prior to  dental procedure  
02:06

## 2018-09-27 ENCOUNTER — OFFICE VISIT (OUTPATIENT)
Dept: FAMILY MEDICINE | Facility: CLINIC | Age: 68
End: 2018-09-27
Payer: MEDICARE

## 2018-09-27 VITALS
SYSTOLIC BLOOD PRESSURE: 126 MMHG | TEMPERATURE: 98 F | HEART RATE: 89 BPM | BODY MASS INDEX: 21.97 KG/M2 | DIASTOLIC BLOOD PRESSURE: 86 MMHG | WEIGHT: 153.44 LBS | OXYGEN SATURATION: 98 % | HEIGHT: 70 IN

## 2018-09-27 DIAGNOSIS — D64.9 ANEMIA, UNSPECIFIED TYPE: Primary | ICD-10-CM

## 2018-09-27 DIAGNOSIS — N18.30 STAGE 3 CHRONIC KIDNEY DISEASE: ICD-10-CM

## 2018-09-27 DIAGNOSIS — H65.91 FLUID LEVEL BEHIND TYMPANIC MEMBRANE OF RIGHT EAR: ICD-10-CM

## 2018-09-27 PROCEDURE — 3074F SYST BP LT 130 MM HG: CPT | Mod: CPTII,S$GLB,, | Performed by: NURSE PRACTITIONER

## 2018-09-27 PROCEDURE — 99213 OFFICE O/P EST LOW 20 MIN: CPT | Mod: S$GLB,,, | Performed by: NURSE PRACTITIONER

## 2018-09-27 PROCEDURE — 3079F DIAST BP 80-89 MM HG: CPT | Mod: CPTII,S$GLB,, | Performed by: NURSE PRACTITIONER

## 2018-09-27 PROCEDURE — 1101F PT FALLS ASSESS-DOCD LE1/YR: CPT | Mod: CPTII,S$GLB,, | Performed by: NURSE PRACTITIONER

## 2018-09-27 PROCEDURE — 99499 UNLISTED E&M SERVICE: CPT | Mod: S$GLB,,, | Performed by: NURSE PRACTITIONER

## 2018-09-27 RX ORDER — FLUTICASONE PROPIONATE 50 MCG
1 SPRAY, SUSPENSION (ML) NASAL DAILY
Qty: 16 G | Refills: 0 | Status: SHIPPED | OUTPATIENT
Start: 2018-09-27 | End: 2019-07-30

## 2018-09-27 RX ORDER — UBIDECARENONE 30 MG
CAPSULE ORAL
COMMUNITY
Start: 2018-09-11 | End: 2019-02-26

## 2018-09-27 NOTE — PROGRESS NOTES
Subjective:       Patient ID: Rodolfo Messina is a 68 y.o. male.    Chief Complaint: Ears stopped up; discuss doing blood wrk (Patient would like to have blood work done to check levels because a while ago his blood ccount was low and his oncologist thought he had cancer but it turned out he didn't )    Ear Fullness    There is pain in the right ear. This is a recurrent problem. The current episode started 1 to 4 weeks ago. The problem occurs constantly. The problem has been unchanged. There has been no fever. The pain is at a severity of 0/10. The patient is experiencing no pain. Pertinent negatives include no abdominal pain, coughing, diarrhea, ear discharge, headaches, hearing loss, neck pain, rash, rhinorrhea, sore throat or vomiting. He has tried nothing for the symptoms. There is no history of a chronic ear infection, hearing loss or a tympanostomy tube.     Review of Systems   Constitutional: Negative for chills, diaphoresis, fatigue and fever.   HENT: Negative for ear discharge, hearing loss, rhinorrhea and sore throat.         Ear fullness     Respiratory: Negative for cough, chest tightness and wheezing.    Cardiovascular: Negative for chest pain, palpitations and leg swelling.   Gastrointestinal: Negative for abdominal pain, diarrhea and vomiting.   Musculoskeletal: Negative for neck pain.   Skin: Negative for rash.   Neurological: Negative for headaches.       Objective:      Physical Exam   Constitutional: He is oriented to person, place, and time. He appears well-developed and well-nourished. No distress.   HENT:   Right Ear: External ear normal. A middle ear effusion is present.   Left Ear: External ear normal.   Cardiovascular: Normal rate, regular rhythm and normal heart sounds.   No murmur heard.  Pulmonary/Chest: Breath sounds normal. No stridor. No respiratory distress.   Neurological: He is alert and oriented to person, place, and time.   Skin: Skin is warm and dry. He is not diaphoretic.    Psychiatric: He has a normal mood and affect. His behavior is normal. Judgment and thought content normal.   Vitals reviewed.      Assessment:       1. Anemia, unspecified type    2. Stage 3 chronic kidney disease    3. Fluid level behind tympanic membrane of right ear        Plan:       Anemia, unspecified type  -     CBC auto differential; Future    Stage 3 chronic kidney disease  -     Comprehensive metabolic panel; Future    Fluid level behind tympanic membrane of right ear  -     fluticasone (FLONASE) 50 mcg/actuation nasal spray; 1 spray (50 mcg total) by Each Nare route once daily.  Dispense: 16 g; Refill: 0

## 2018-09-27 NOTE — PROGRESS NOTES
Patient, Rodolfo Messina (MRN #[id number]), presented with a recent Platelet count less than 150 K/uL consistent with the definition of thrombocytopenia (ICD10 - D69.6).    Platelets   Date Value Ref Range Status   08/30/2018 136 (L) 150 - 350 K/uL Final     The patient's thrombocytopenia was monitored, evaluated, addressed and/or treated. This addendum to the medical record is made on 09/27/2018.

## 2018-09-28 ENCOUNTER — TELEPHONE (OUTPATIENT)
Dept: HEMATOLOGY/ONCOLOGY | Facility: CLINIC | Age: 68
End: 2018-09-28

## 2018-09-28 ENCOUNTER — TELEPHONE (OUTPATIENT)
Dept: FAMILY MEDICINE | Facility: CLINIC | Age: 68
End: 2018-09-28

## 2018-09-28 LAB
ALBUMIN SERPL-MCNC: 4.1 G/DL (ref 3.6–5.1)
ALBUMIN/GLOB SERPL: 1.9 (CALC) (ref 1–2.5)
ALP SERPL-CCNC: 71 U/L (ref 40–115)
ALT SERPL-CCNC: 18 U/L (ref 9–46)
AST SERPL-CCNC: 19 U/L (ref 10–35)
BASOPHILS # BLD AUTO: 30 CELLS/UL (ref 0–200)
BASOPHILS NFR BLD AUTO: 0.8 %
BILIRUB SERPL-MCNC: 0.9 MG/DL (ref 0.2–1.2)
BUN SERPL-MCNC: 25 MG/DL (ref 7–25)
BUN/CREAT SERPL: 16 (CALC) (ref 6–22)
CALCIUM SERPL-MCNC: 9.5 MG/DL (ref 8.6–10.3)
CHLORIDE SERPL-SCNC: 111 MMOL/L (ref 98–110)
CO2 SERPL-SCNC: 27 MMOL/L (ref 20–32)
CREAT SERPL-MCNC: 1.59 MG/DL (ref 0.7–1.25)
EOSINOPHIL # BLD AUTO: 353 CELLS/UL (ref 15–500)
EOSINOPHIL NFR BLD AUTO: 9.3 %
ERYTHROCYTE [DISTWIDTH] IN BLOOD BY AUTOMATED COUNT: 13.3 % (ref 11–15)
GFR SERPL CREATININE-BSD FRML MDRD: 44 ML/MIN/1.73M2
GLOBULIN SER CALC-MCNC: 2.2 G/DL (CALC) (ref 1.9–3.7)
GLUCOSE SERPL-MCNC: 82 MG/DL (ref 65–99)
HCT VFR BLD AUTO: 29.4 % (ref 38.5–50)
HGB BLD-MCNC: 9.7 G/DL (ref 13.2–17.1)
LYMPHOCYTES # BLD AUTO: 878 CELLS/UL (ref 850–3900)
LYMPHOCYTES NFR BLD AUTO: 23.1 %
MCH RBC QN AUTO: 32.2 PG (ref 27–33)
MCHC RBC AUTO-ENTMCNC: 33 G/DL (ref 32–36)
MCV RBC AUTO: 97.7 FL (ref 80–100)
MONOCYTES # BLD AUTO: 384 CELLS/UL (ref 200–950)
MONOCYTES NFR BLD AUTO: 10.1 %
NEUTROPHILS # BLD AUTO: 2155 CELLS/UL (ref 1500–7800)
NEUTROPHILS NFR BLD AUTO: 56.7 %
PLATELET # BLD AUTO: 128 THOUSAND/UL (ref 140–400)
PMV BLD REES-ECKER: 11.3 FL (ref 7.5–12.5)
POTASSIUM SERPL-SCNC: 5 MMOL/L (ref 3.5–5.3)
PROT SERPL-MCNC: 6.3 G/DL (ref 6.1–8.1)
RBC # BLD AUTO: 3.01 MILLION/UL (ref 4.2–5.8)
SODIUM SERPL-SCNC: 144 MMOL/L (ref 135–146)
WBC # BLD AUTO: 3.8 THOUSAND/UL (ref 3.8–10.8)

## 2018-09-28 RX ORDER — CITALOPRAM 20 MG/1
20 TABLET, FILM COATED ORAL DAILY
Qty: 30 TABLET | Refills: 1 | Status: SHIPPED | OUTPATIENT
Start: 2018-09-28 | End: 2019-03-26

## 2018-09-28 NOTE — TELEPHONE ENCOUNTER
Spoke with patient wife let her know about the blood work thinks he needs something for depression explained her about the medicine

## 2018-09-28 NOTE — TELEPHONE ENCOUNTER
----- Message from Kaylynn Wang sent at 9/28/2018  4:37 PM CDT -----  Contact: Pt      ----- Message -----  From: Shani Ramos  Sent: 9/28/2018   4:14 PM  To: Kaylynn Wang    [facility]   Callback#[phone]  Thank You  ALEJANDRO ramos

## 2018-09-28 NOTE — TELEPHONE ENCOUNTER
Called pt and wanted to rescheduled.  Patient going out of town on day of appt but if dr wayne needs to really see him he will come.  First available is 11/15.  Patient went to PCP and blood count is 9.7 saw Coleen the NP.  Told him I will send message to doctor.

## 2018-10-01 ENCOUNTER — TELEPHONE (OUTPATIENT)
Dept: HEMATOLOGY/ONCOLOGY | Facility: CLINIC | Age: 68
End: 2018-10-01

## 2018-10-01 NOTE — TELEPHONE ENCOUNTER
----- Message from López Go MD sent at 10/1/2018 11:26 AM CDT -----  Regarding: RE: APPOINTMENT  He can keep his appt with me on 10/04.    ----- Message -----  From: Usha Castillo  Sent: 9/28/2018   4:46 PM  To: López Go MD  Subject: APPOINTMENT                                      Dr Go,    Patient wants to reschedule his appointment but your first available is 11/15.  Patient seen Coleen the NP at his PCP and his blood count is 9.7 and holding.  Patient is going out of town same day as appointment, he can make it if you really need to see him but if not wants to know can he wait until 11/15.  Let me know.  Thanks.

## 2018-10-04 ENCOUNTER — TELEPHONE (OUTPATIENT)
Dept: HEMATOLOGY/ONCOLOGY | Facility: CLINIC | Age: 68
End: 2018-10-04

## 2018-10-04 NOTE — TELEPHONE ENCOUNTER
----- Message from López Go MD sent at 10/4/2018  1:38 PM CDT -----  Regarding: Clinic Visit  Hey Mrs Castillo and Mrs Rocha,    The patient attached missed his clinic appt today. Please call the patient and see what day he would be available for a clinic appointment with me in the next two months.  Make sure his CBC and CMP from today are linked to that clinic visit.     Thanks    López Go MD PGY-VI  Hematology and Oncology  Pager:[phone]

## 2018-10-12 ENCOUNTER — ANTI-COAG VISIT (OUTPATIENT)
Dept: CARDIOLOGY | Facility: CLINIC | Age: 68
End: 2018-10-12

## 2018-10-12 ENCOUNTER — PATIENT MESSAGE (OUTPATIENT)
Dept: CARDIOLOGY | Facility: CLINIC | Age: 68
End: 2018-10-12

## 2018-10-12 DIAGNOSIS — Z79.01 CHRONIC ANTICOAGULATION: ICD-10-CM

## 2018-10-12 DIAGNOSIS — Z95.2 S/P MVR (MITRAL VALVE REPLACEMENT): ICD-10-CM

## 2018-10-12 NOTE — PROGRESS NOTES
"Per d/c summary 7/13/18 "case discussed with Dr. Go regarding anticoagulation and because the patient has had several bleeding complications and is now two months out from his surgery, the risks of continuing with warfarin outweighs the benefits at this time; can continue EC ASA 81 mg daily; heparin and warfarin discontinued; patient's wife aware and will update patient"  "

## 2018-10-12 NOTE — PROGRESS NOTES
Per d/c summary 7/2018: case discussed with Dr. Go regarding anticoagulation and because the patient has had several bleeding complications and is now two months out from his surgery, the risks of continuing with warfarin outweighs the benefits at this time; can continue EC ASA 81 mg daily; heparin and warfarin discontinued; patient's wife aware and will update patient

## 2018-10-18 ENCOUNTER — LAB VISIT (OUTPATIENT)
Dept: LAB | Facility: HOSPITAL | Age: 68
End: 2018-10-18
Attending: INTERNAL MEDICINE
Payer: MEDICARE

## 2018-10-18 ENCOUNTER — OFFICE VISIT (OUTPATIENT)
Dept: HEMATOLOGY/ONCOLOGY | Facility: CLINIC | Age: 68
End: 2018-10-18
Payer: MEDICARE

## 2018-10-18 VITALS
HEIGHT: 70 IN | RESPIRATION RATE: 18 BRPM | SYSTOLIC BLOOD PRESSURE: 162 MMHG | WEIGHT: 152.31 LBS | HEART RATE: 80 BPM | BODY MASS INDEX: 21.81 KG/M2 | TEMPERATURE: 99 F | DIASTOLIC BLOOD PRESSURE: 63 MMHG | OXYGEN SATURATION: 97 %

## 2018-10-18 DIAGNOSIS — C85.10 B-CELL LYMPHOMA, UNSPECIFIED B-CELL LYMPHOMA TYPE, UNSPECIFIED BODY REGION: ICD-10-CM

## 2018-10-18 DIAGNOSIS — D64.9 ANEMIA, UNSPECIFIED TYPE: ICD-10-CM

## 2018-10-18 DIAGNOSIS — C85.10 B-CELL LYMPHOMA, UNSPECIFIED B-CELL LYMPHOMA TYPE, UNSPECIFIED BODY REGION: Primary | ICD-10-CM

## 2018-10-18 LAB
ALBUMIN SERPL BCP-MCNC: 3.6 G/DL
ALP SERPL-CCNC: 64 U/L
ALT SERPL W/O P-5'-P-CCNC: 15 U/L
ANION GAP SERPL CALC-SCNC: 7 MMOL/L
AST SERPL-CCNC: 18 U/L
BASOPHILS # BLD AUTO: 0.01 K/UL
BASOPHILS NFR BLD: 0.3 %
BILIRUB SERPL-MCNC: 0.9 MG/DL
BUN SERPL-MCNC: 26 MG/DL
CALCIUM SERPL-MCNC: 9.4 MG/DL
CHLORIDE SERPL-SCNC: 113 MMOL/L
CO2 SERPL-SCNC: 25 MMOL/L
CREAT SERPL-MCNC: 1.4 MG/DL
DIFFERENTIAL METHOD: ABNORMAL
EOSINOPHIL # BLD AUTO: 0.2 K/UL
EOSINOPHIL NFR BLD: 5.7 %
ERYTHROCYTE [DISTWIDTH] IN BLOOD BY AUTOMATED COUNT: 13.7 %
EST. GFR  (AFRICAN AMERICAN): 59.3 ML/MIN/1.73 M^2
EST. GFR  (NON AFRICAN AMERICAN): 51.3 ML/MIN/1.73 M^2
GLUCOSE SERPL-MCNC: 106 MG/DL
HCT VFR BLD AUTO: 30.3 %
HGB BLD-MCNC: 10 G/DL
IMM GRANULOCYTES # BLD AUTO: 0.02 K/UL
IMM GRANULOCYTES NFR BLD AUTO: 0.5 %
LYMPHOCYTES # BLD AUTO: 0.8 K/UL
LYMPHOCYTES NFR BLD: 21.1 %
MCH RBC QN AUTO: 32.9 PG
MCHC RBC AUTO-ENTMCNC: 33 G/DL
MCV RBC AUTO: 100 FL
MONOCYTES # BLD AUTO: 0.4 K/UL
MONOCYTES NFR BLD: 9.5 %
NEUTROPHILS # BLD AUTO: 2.4 K/UL
NEUTROPHILS NFR BLD: 62.9 %
NRBC BLD-RTO: 0 /100 WBC
PLATELET # BLD AUTO: 107 K/UL
PMV BLD AUTO: 10.7 FL
POTASSIUM SERPL-SCNC: 4.3 MMOL/L
PROT SERPL-MCNC: 6.4 G/DL
RBC # BLD AUTO: 3.04 M/UL
SODIUM SERPL-SCNC: 145 MMOL/L
WBC # BLD AUTO: 3.88 K/UL

## 2018-10-18 PROCEDURE — 99214 OFFICE O/P EST MOD 30 MIN: CPT | Mod: S$PBB,GC,, | Performed by: INTERNAL MEDICINE

## 2018-10-18 PROCEDURE — 3078F DIAST BP <80 MM HG: CPT | Mod: CPTII,GC,, | Performed by: INTERNAL MEDICINE

## 2018-10-18 PROCEDURE — 99999 PR PBB SHADOW E&M-EST. PATIENT-LVL IV: CPT | Mod: PBBFAC,GC,,

## 2018-10-18 PROCEDURE — 36415 COLL VENOUS BLD VENIPUNCTURE: CPT

## 2018-10-18 PROCEDURE — 80053 COMPREHEN METABOLIC PANEL: CPT

## 2018-10-18 PROCEDURE — 85025 COMPLETE CBC W/AUTO DIFF WBC: CPT

## 2018-10-18 PROCEDURE — 99214 OFFICE O/P EST MOD 30 MIN: CPT | Mod: PBBFAC | Performed by: INTERNAL MEDICINE

## 2018-10-18 PROCEDURE — 1101F PT FALLS ASSESS-DOCD LE1/YR: CPT | Mod: CPTII,GC,, | Performed by: INTERNAL MEDICINE

## 2018-10-18 PROCEDURE — 3077F SYST BP >= 140 MM HG: CPT | Mod: CPTII,GC,, | Performed by: INTERNAL MEDICINE

## 2018-10-18 NOTE — PROGRESS NOTES
PATIENT: Rodolfo Messina  MRN: [id number]  DATE: 10/18/2018      Diagnosis:   1. Anemia, unspecified type    2. B-cell lymphoma, unspecified B-cell lymphoma type, unspecified body region        Chief Complaint: B-cell lymphoma, unspecified B-cell lymphoma type, unspecifi and Results      Oncologic History:      Oncologic History     Oncologic Treatment     Pathology       Subjective:    Interval History: Mr. Messina is a 68 y.o. male who presents for follow up for low grade lymphoma.  Since the last clinic the patient states he has been continuing to work with cardiac rehab and is making significant strives.  He denies fatigue, SOB, CP, fever, chills, night sweats, weight loss, new lumps or bumps, easy bruising or bleeding.  He denies melena or BRBPR.    Initial History: Pt has a h/o anemia documented in the [facility] sysm[facility] since 2015 with decline in 1/2018, HFrEF, MVR 5/2018, CAD s/p CABG 5/2018, dysphagia developed after CABG, chronic lackey who presents to clinic for establishment of care for anemia and recently diagnosed B-cell lymphoma, non hodgkin's lymphoma.  The patient recently was admitted to the hospital from 6/29-7/13  During the admission the patient was seen to have supra therapeutic INR at 4.1 with hemoglobin of 6.6.  The patient was taken off of coumadin on 7/09/18.  GI consulted 7/09/18 for positive FOBT on 7/08/18 and decreasing hemoglobin. CT Renal Stone Study performed on 7/09/18 showed a new 5.2 cm heterogeneous left psoas collection concerning for blood, moderate bilateral pleural effusions, postsurgical change of mitral valve replacement, gastrojejunostomy tube, right renal cyst, bladder diverticulum, diverticulosis, and aortic ectasia and atherosclerosis.  VCE was performed on 7/11/18 and showed no obvious signs of bleeding.  Heme/Onc consulted for persistent and worsening anemia on 7/13/18.  BM biopsy was performed at that time given his chronic history of anemia and unclear etiology. BM  biopsy from 7/13/18 returned positive for low grade B-cell lymphoma involving 5-10% of the marrow per discussion with Dr. Dhaliwal.  The patient had been previously worked up for anemia with EGD and colonoscopy on 2/01/18 showing non bleeding erosive gastritis, internal hemorrhoids, and diverticulosis.  In addition the patient had an SPEP checked which was normal and normal B-12 and folate in the past.    Past Medical History:   Past Medical History:   Diagnosis Date    ANNA (acute kidney injury) 2/22/2018    Anemia     Anxiety     Arthritis     BPH (benign prostatic hyperplasia)     CHF (congestive heart failure) 5/23/2018    Coarse tremors     Coronary artery disease of native artery of native heart with stable angina pectoris 5/21/2018    Diverticulosis     Encounter for blood transfusion     Endocarditis     Hypertension     Non Hodgkin's lymphoma 8/2/2018    Urinary retention        Past Surgical HIstory:   Past Surgical History:   Procedure Laterality Date    AORTOCORONARY BYPASS-CABG N/A 5/25/2018    Performed by Marvin Go MD at [facility] OR [address] [place] [address]    APPENDECTOMY      COLONOSCOPY      COLONOSCOPY N/A 2/1/2018    Procedure: COLONOSCOPY;  Surgeon: Richar Payan MD;  [facility] ([address] [place] [address]);  Service: Endoscopy;  Laterality: N/A;  PM prep    COLONOSCOPY N/A 2/1/2018    Performed by [facility]an [facility] at [facility] ([address] [place] [address])    CORONARY ARTERY BYPASS GRAFT (CABG) N/A 5/25/2018    Procedure: AORTOCORONARY BYPASS-CABG;  Surgeon: Marvin Go MD;  Location: [facility] OR [address] [place] [address];  Service: Cardiovascular;  Laterality: N/A;    ESOPHAGOGASTRODUODENOSCOPY (EGD) N/A 2/1/2018    Performed by [facility]an [facility] at [facility] ([address] [place] [address])    EYE SURGERY Right     cataract extraction    FINGER SURGERY      FRACTURE SURGERY Right     index finger    MITRAL VALVE REPLACEMENT N/A 5/25/2018    Procedure: Mitral Valve Replacement;  Surgeon: Marvin Go MD;  Location: [facility] OR [address] [place] [address];  Service:  Cardiovascular;  Laterality: N/A;    Mitral Valve Replacement N/A 5/25/2018    Performed by Marvin Go MD at [facility] OR 2ND FLR    TONSILLECTOMY      TRANSESOPHAGEAL ECHOCARDIOGRAM (SOWMYA) N/A 2/27/2018    Performed by [facility] Diagnostic Provider at [facility] CATH LAB       Family History:   Family History   Problem Relation Age of Onset    Stroke Mother     Heart disease Mother         CABG    Heart disease Father         CABG    No Known Problems Sister     No Known Problems Brother        Social History:  reports that  has never smoked. he has never used smokeless tobacco. He reports that he does not drink alcohol or use drugs.    Allergies:  Review of patient's allergies indicates:   Allergen Reactions    Shellfish containing products     Augmentin [amoxicillin-pot clavulanate]      Patient felt that it raised BP and requested to have it added as intolerance. Tolerated unasyn and ampicillin.    Ciprofloxacin     Flagyl [metronidazole]     Lisinopril Other (See Comments)     cough    Mysoline [primidone]     Omnicef [cefdinir]        Medications:  Current Outpatient Medications   Medication Sig Dispense Refill    aspirin 81 MG Chew 4 tablets (324 mg total) by Per G Tube route once daily.  0    atorvastatin (LIPITOR) 40 MG tablet 1 tablet (40 mg total) by Per G Tube route once daily. 90 tablet 3    citalopram (CELEXA) 20 MG tablet Take 1 tablet (20 mg total) by mouth once daily. 30 tablet 1    co-enzyme Q-10 30 mg capsule       fluticasone (FLONASE) 50 mcg/actuation nasal spray 1 spray (50 mcg total) by Each Nare route once daily. 16 g 0    gabapentin (NEURONTIN) 100 MG capsule Take 1 capsule (100 mg total) by mouth 3 (three) times daily. 90 capsule 11    lactose-reduced food (ENSURE HIGH PROTEIN-MUSCLE ORAL) Take by mouth 2 (two) times daily.      loratadine (CLARITIN) 10 mg tablet Take 10 mg by mouth once daily.      melatonin 10 mg Tab Take by mouth.      mirtazapine (REMERON) 30 MG tablet Take  "1 tablet (30 mg total) by mouth every evening. 90 tablet 3    multivit-min/FA/lycopen/lutein (ADULTS 50 PLUS ORAL) Take by mouth.      pantoprazole (PROTONIX) 40 MG tablet Take 1 tablet (40 mg total) by mouth once daily. 30 tablet 5    tamsulosin (FLOMAX) 0.4 mg Cap 1 capsule (0.4 mg total) by Per G Tube route once daily. 30 capsule 11     No current facility-administered medications for this visit.      Review of Systems   Constitutional: Negative for chills, diaphoresis, fever and weight loss.   Respiratory: Negative for cough, sputum production and shortness of breath.    Cardiovascular: Negative for chest pain and leg swelling.   Gastrointestinal: Negative for abdominal pain, constipation, diarrhea, nausea and vomiting.   Neurological: Negative for weakness and headaches.   Endo/Heme/Allergies: Does not bruise/bleed easily.           ECOG Performance Status: 1   Objective:      Vitals:   Vitals:    10/18/18 0925   BP: (!) 162/63   BP Location: Left arm   Patient Position: Sitting   BP Method: Medium (Automatic)   Pulse: 80   Resp: 18   Temp: 98.7 °F (37.1 °C)   TempSrc: Oral   SpO2: 97%   Weight: 69.1 kg (152 lb 5.4 oz)   Height: 5' 10" (1.778 m)     BMI: Body mass index is 21.86 kg/m².    Physical Exam   Constitutional: He is oriented to person, place, and time and well-developed, well-nourished, and in no distress.   HENT:   Head: Normocephalic and atraumatic.   Mouth/Throat: Oropharynx is clear and moist. No oropharyngeal exudate.   Eyes: Pupils are equal, round, and reactive to light. Right eye exhibits no discharge. Left eye exhibits no discharge. No scleral icterus.   Neck: Normal range of motion. No JVD present.   Cardiovascular: Normal rate, regular rhythm and intact distal pulses. Exam reveals no gallop and no friction rub.   No murmur heard.  Pulmonary/Chest: Effort normal and breath sounds normal. No respiratory distress. He has no wheezes. He has no rales. He exhibits no tenderness.   Abdominal: " Soft. Bowel sounds are normal. He exhibits no distension and no mass. There is no tenderness. There is no rebound and no guarding.   Musculoskeletal: Normal range of motion. He exhibits no edema or tenderness.   Lymphadenopathy:        Head (right side): No submental and no submandibular adenopathy present.        Head (left side): No submental and no submandibular adenopathy present.     He has no cervical adenopathy.     He has no axillary adenopathy.        Right: No inguinal and no supraclavicular adenopathy present.        Left: No inguinal and no supraclavicular adenopathy present.   Neurological: He is alert and oriented to person, place, and time.   Skin: Skin is warm and dry. No rash noted. He is not diaphoretic. No erythema. No pallor.         Laboratory Data:  Lab Visit on 10/18/2018   Component Date Value Ref Range Status    Sodium 10/18/2018 145  136 - 145 mmol/L Final    Potassium 10/18/2018 4.3  3.5 - 5.1 mmol/L Final    Chloride 10/18/2018 113* 95 - 110 mmol/L Final    CO2 10/18/2018 25  23 - 29 mmol/L Final    Glucose 10/18/2018 106  70 - 110 mg/dL Final    BUN, Bld 10/18/2018 26* 8 - 23 mg/dL Final    Creatinine 10/18/2018 1.4  0.5 - 1.4 mg/dL Final    Calcium 10/18/2018 9.4  8.7 - 10.5 mg/dL Final    Total Protein 10/18/2018 6.4  6.0 - 8.4 g/dL Final    Albumin 10/18/2018 3.6  3.5 - 5.2 g/dL Final    Total Bilirubin 10/18/2018 0.9  0.1 - 1.0 mg/dL Final    Comment: For infants and newborns, interpretation of results should be based  on gestational age, weight and in agreement with clinical  observations.  Premature Infant recommended reference ranges:  Up to 24 hours.............<8.0 mg/dL  Up to 48 hours............<12.0 mg/dL  3-5 days..................<15.0 mg/dL  6-29 days.................<15.0 mg/dL      Alkaline Phosphatase 10/18/2018 64  55 - 135 U/L Final    AST 10/18/2018 18  10 - 40 U/L Final    ALT 10/18/2018 15  10 - 44 U/L Final    Anion Gap 10/18/2018 7* 8 - 16 mmol/L  Final    eGFR if  10/18/2018 59.3* >60 mL/min/1.73 m^2 Final    eGFR if non African American 10/18/2018 51.3* >60 mL/min/1.73 m^2 Final    Comment: Calculation used to obtain the estimated glomerular filtration  rate (eGFR) is the CKD-EPI equation.       WBC 10/18/2018 3.88* 3.90 - 12.70 K/uL Final    RBC 10/18/2018 3.04* 4.60 - 6.20 M/uL Final    Hemoglobin 10/18/2018 10.0* 14.0 - 18.0 g/dL Final    Hematocrit 10/18/2018 30.3* 40.0 - 54.0 % Final    MCV 10/18/2018 100* 82 - 98 fL Final    MCH 10/18/2018 32.9* 27.0 - 31.0 pg Final    MCHC 10/18/2018 33.0  32.0 - 36.0 g/dL Final    RDW 10/18/2018 13.7  11.5 - 14.5 % Final    Platelets 10/18/2018 107* 150 - 350 K/uL Final    MPV 10/18/2018 10.7  9.2 - 12.9 fL Final    Immature Granulocytes 10/18/2018 0.5  0.0 - 0.5 % Final    Gran # (ANC) 10/18/2018 2.4  1.8 - 7.7 K/uL Final    Immature Grans (Abs) 10/18/2018 0.02  0.00 - 0.04 K/uL Final    Comment: Mild elevation in immature granulocytes is non specific and   can be seen in a variety of conditions including stress response,   acute inflammation, trauma and pregnancy. Correlation with other   laboratory and clinical findings is essential.      Lymph # 10/18/2018 0.8* 1.0 - 4.8 K/uL Final    Mono # 10/18/2018 0.4  0.3 - 1.0 K/uL Final    Eos # 10/18/2018 0.2  0.0 - 0.5 K/uL Final    Baso # 10/18/2018 0.01  0.00 - 0.20 K/uL Final    nRBC 10/18/2018 0  0 /100 WBC Final    Gran% 10/18/2018 62.9  38.0 - 73.0 % Final    Lymph% 10/18/2018 21.1  18.0 - 48.0 % Final    Mono% 10/18/2018 9.5  4.0 - 15.0 % Final    Eosinophil% 10/18/2018 5.7  0.0 - 8.0 % Final    Basophil% 10/18/2018 0.3  0.0 - 1.9 % Final    Differential Method 10/18/2018 Automated   Final         Imaging:  Reviewed      Assessment:       1. Anemia, unspecified type    2. B-cell lymphoma, unspecified B-cell lymphoma type, unspecified body region           Plan:     B-Cell Lymphoma - The patient has non-hodgkin's lymphoma  with involvement in the bone marrow via BM biopsy on 7/13/18 with 5-10% of marrow involved.  -CT of the abdomen and CT renal stone study done on 7/19/18 and 7/13/18 respectively failed to show intraabdominal LAD or splenomegaly  -CT chest on 6/15/18 showed no LAD   -The patient is pancytopenic with platelets of 107k, hemoglobin of 10g/dL and WBC of 3.88k  -Will continue to monitor  -If patient requires treatment in the future will consider repeat BM biopsy and rituximab.  -Will check labs once a month and have the patient follow up in three months.    Normocytic Anemia - The patient's anemia has improved  -His anemia may be from his underlying lymphoma or from anemia of CKD.  -Starting treatment with Darbepoetin was discussed. Potential side effects including but not limited to heart attack and blood clots were also discussed.  -The patient is se to see a nephrologist Dr. Tanisha Sanchez on 10/31/18.  -Will message Dr Sanchez concerning potential anemia of CKD diagnosis.    -Discussed with Dr Enriquez.    López Go MD PGY-IV  Hematology and Oncology  Pager:[phone]    Distress Screening Results: Psychosocial Distress screening score of Distress Score: 1 noted and reviewed. No intervention indicated.

## 2018-10-30 ENCOUNTER — OFFICE VISIT (OUTPATIENT)
Dept: CARDIOLOGY | Facility: CLINIC | Age: 68
End: 2018-10-30
Payer: MEDICARE

## 2018-10-30 VITALS
DIASTOLIC BLOOD PRESSURE: 68 MMHG | BODY MASS INDEX: 22.33 KG/M2 | WEIGHT: 156 LBS | HEART RATE: 80 BPM | HEIGHT: 70 IN | SYSTOLIC BLOOD PRESSURE: 135 MMHG

## 2018-10-30 DIAGNOSIS — I50.22 CHRONIC SYSTOLIC CONGESTIVE HEART FAILURE: ICD-10-CM

## 2018-10-30 DIAGNOSIS — Z95.2 S/P MVR (MITRAL VALVE REPLACEMENT): ICD-10-CM

## 2018-10-30 DIAGNOSIS — Z95.1 S/P CABG X 1: Primary | ICD-10-CM

## 2018-10-30 DIAGNOSIS — I10 ESSENTIAL HYPERTENSION: Chronic | ICD-10-CM

## 2018-10-30 DIAGNOSIS — I33.0 SUBACUTE BACTERIAL ENDOCARDITIS: ICD-10-CM

## 2018-10-30 DIAGNOSIS — N18.30 CKD (CHRONIC KIDNEY DISEASE) STAGE 3, GFR 30-59 ML/MIN: ICD-10-CM

## 2018-10-30 DIAGNOSIS — Z86.79 H/O ATRIAL FLUTTER: ICD-10-CM

## 2018-10-30 PROCEDURE — 3078F DIAST BP <80 MM HG: CPT | Mod: CPTII,,, | Performed by: INTERNAL MEDICINE

## 2018-10-30 PROCEDURE — 99999 PR PBB SHADOW E&M-EST. PATIENT-LVL II: CPT | Mod: PBBFAC,,, | Performed by: INTERNAL MEDICINE

## 2018-10-30 PROCEDURE — 99499 UNLISTED E&M SERVICE: CPT | Mod: HCNC,S$GLB,, | Performed by: INTERNAL MEDICINE

## 2018-10-30 PROCEDURE — 99213 OFFICE O/P EST LOW 20 MIN: CPT | Mod: S$PBB,,, | Performed by: INTERNAL MEDICINE

## 2018-10-30 PROCEDURE — 3075F SYST BP GE 130 - 139MM HG: CPT | Mod: CPTII,,, | Performed by: INTERNAL MEDICINE

## 2018-10-30 PROCEDURE — 1101F PT FALLS ASSESS-DOCD LE1/YR: CPT | Mod: CPTII,,, | Performed by: INTERNAL MEDICINE

## 2018-10-30 PROCEDURE — 99212 OFFICE O/P EST SF 10 MIN: CPT | Mod: PBBFAC,PO | Performed by: INTERNAL MEDICINE

## 2018-10-30 RX ORDER — IBUPROFEN 100 MG/5ML
1000 SUSPENSION, ORAL (FINAL DOSE FORM) ORAL EVERY OTHER DAY
COMMUNITY

## 2018-10-30 NOTE — PROGRESS NOTES
Subjective:      Patient ID: Rodolfo Messina is a 68 y.o. male.    Chief Complaint: Follow-up (Hypertension, S/P MVR)    HPI:  Exercises at cardiac rehab.  Some fatigue with upper body exercises.  Pt was instructed to slow down when his heart rate went up.  Eating better  Takes Ensure  ROS     Past Medical History:   Diagnosis Date    ANNA (acute kidney injury) 2/22/2018    Anemia     Anxiety     Arthritis     BPH (benign prostatic hyperplasia)     CHF (congestive heart failure) 5/23/2018    Coarse tremors     Coronary artery disease of native artery of native heart with stable angina pectoris 5/21/2018    Diverticulosis     Encounter for blood transfusion     Endocarditis     Hypertension     Non Hodgkin's lymphoma 8/2/2018    Urinary retention         Past Surgical History:   Procedure Laterality Date    AORTOCORONARY BYPASS-CABG N/A 5/25/2018    Performed by Marvin Go MD at [facility] OR [address] [place] [address]    APPENDECTOMY      COLONOSCOPY      COLONOSCOPY N/A 2/1/2018    Procedure: COLONOSCOPY;  Surgeon: Richar Payan MD;  Location: [facility] ([address] [place] [address]);  Service: Endoscopy;  Laterality: N/A;  PM prep    COLONOSCOPY N/A 2/1/2018    Performed by [facility] Payan MD at [facility] ([facility])    CORONARY ARTERY BYPASS GRAFT (CABG) N/A 5/25/2018    Procedure: AORTOCORONARY BYPASS-CABG;  Surgeon: Marvin Go MD;  Location: [facility] OR [address] [place] [address];  Service: Cardiovascular;  Laterality: N/A;    ESOPHAGOGASTRODUODENOSCOPY (EGD) N/A 2/1/2018    [facility]    EYE SURGERY Right     cataract extraction    FINGER SURGERY      FRACTURE SURGERY Right     index finger    MITRAL VALVE REPLACEMENT N/A 5/25/2018    Procedure: Mitral Valve Replacement;  Surgeon: Marvin Go MD;  Location: [facility] OR [address] [place] [address];  Service: Cardiovascular;  Laterality: N/A;    Mitral Valve Replacement N/A 5/25/2018    Performed by Marvin Go MD at [facility] OR [address] [place] [address]    TONSILLECTOMY       TRANSESOPHAGEAL ECHOCARDIOGRAM (SOWMYA) N/A 2/27/2018    Performed by [facility] Diagnostic Provider at [facility] CATH LAB       Family History   Problem Relation Age of Onset    Stroke Mother     Heart disease Mother         CABG    Heart disease Father         CABG    No Known Problems Sister     No Known Problems Brother        Social History     Socioeconomic History    Marital status:      Spouse name: None    Number of children: None    Years of education: None    Highest education level: None   Social Needs    Financial resource strain: None    Food insecurity - worry: None    Food insecurity - inability: None    Transportation needs - medical: None    Transportation needs - non-medical: None   Occupational History     Employer: [employer]   Tobacco Use    Smoking status: Never Smoker    Smokeless tobacco: Never Used   Substance and Sexual Activity    Alcohol use: No     Comment: none recently    Drug use: No    Sexual activity: Not Currently     Partners: Female   Other Topics Concern    None   Social History Narrative    None       Current Outpatient Medications on File Prior to Visit   Medication Sig Dispense Refill    ascorbic acid, vitamin C, (VITAMIN C) 1000 MG tablet Take 1,000 mg by mouth every other day.      aspirin 81 MG Chew 4 tablets (324 mg total) by Per G Tube route once daily.  0    atorvastatin (LIPITOR) 40 MG tablet 1 tablet (40 mg total) by Per G Tube route once daily. 90 tablet 3    fluticasone (FLONASE) 50 mcg/actuation nasal spray 1 spray (50 mcg total) by Each Nare route once daily. 16 g 0    lactose-reduced food (ENSURE HIGH PROTEIN-MUSCLE ORAL) Take by mouth 2 (two) times daily.      loratadine (CLARITIN) 10 mg tablet Take 10 mg by mouth once daily.      melatonin 10 mg Tab Take by mouth.      mirtazapine (REMERON) 30 MG tablet Take 1 tablet (30 mg total) by mouth every evening. 90 tablet 3    multivit-min/FA/lycopen/lutein (ADULTS 50 PLUS ORAL) Take by mouth every  "other day.       pantoprazole (PROTONIX) 40 MG tablet Take 1 tablet (40 mg total) by mouth once daily. 30 tablet 5    tamsulosin (FLOMAX) 0.4 mg Cap 1 capsule (0.4 mg total) by Per G Tube route once daily. 30 capsule 11    citalopram (CELEXA) 20 MG tablet Take 1 tablet (20 mg total) by mouth once daily. 30 tablet 1    co-enzyme Q-10 30 mg capsule       gabapentin (NEURONTIN) 100 MG capsule Take 1 capsule (100 mg total) by mouth 3 (three) times daily. 90 capsule 11     No current facility-administered medications on file prior to visit.        Review of patient's allergies indicates:   Allergen Reactions    Metoprolol Diarrhea    Shellfish containing products     Augmentin [amoxicillin-pot clavulanate]      Patient felt that it raised BP and requested to have it added as intolerance. Tolerated unasyn and ampicillin.    Ciprofloxacin     Flagyl [metronidazole]     Lisinopril Other (See Comments)     cough    Mysoline [primidone]     Omnicef [cefdinir]      Objective:     Vitals:    10/30/18 1538   BP: 135/68   BP Location: Left arm   Patient Position: Sitting   BP Method: Medium (Automatic)   Pulse: 80   Weight: 70.8 kg (156 lb)   Height: 5' 10" (1.778 m)        Physical Exam   Constitutional: He is oriented to person, place, and time. He appears well-developed and well-nourished. No distress.   Eyes: No scleral icterus.   Neck: No JVD present. Carotid bruit is not present.   Cardiovascular: Regular rhythm and normal heart sounds. Exam reveals no gallop and no friction rub.   No murmur heard.  Pulmonary/Chest: Effort normal and breath sounds normal. No respiratory distress.   Musculoskeletal: He exhibits no edema.   Neurological: He is alert and oriented to person, place, and time.   Skin: Skin is warm and dry. He is not diaphoretic.   Psychiatric: He has a normal mood and affect. His behavior is normal. Judgment and thought content normal.   Vitals reviewed.     Wt  Up 4 lbs    Lab 2 weeks ago showed " mild pancytopenia  Creat 1.4  Assessment:     1. S/P CABG x 1    2. S/P MVR (mitral valve replacement)    3. H/O atrial flutter    4. Chronic systolic congestive heart failure    5. Subacute bacterial endocarditis    6. Essential hypertension    7. CKD (chronic kidney disease) stage 3, GFR 30-59 ml/min      Plan:   Rodolfo was seen today for follow-up.    Diagnoses and all orders for this visit:    S/P CABG x 1    S/P MVR (mitral valve replacement)    H/O atrial flutter    Chronic systolic congestive heart failure    Subacute bacterial endocarditis    Essential hypertension    CKD (chronic kidney disease) stage 3, GFR 30-59 ml/min       Pt has appt with nephrologist, Dr Roman, tomorrow.  Same meds  F/u with Dr Go, heme/onc  RTC 4 months  Follow-up in about 4 months (around 2/28/2019).

## 2018-10-31 ENCOUNTER — OFFICE VISIT (OUTPATIENT)
Dept: NEPHROLOGY | Facility: CLINIC | Age: 68
End: 2018-10-31
Payer: MEDICARE

## 2018-10-31 VITALS
SYSTOLIC BLOOD PRESSURE: 150 MMHG | HEIGHT: 70 IN | HEART RATE: 74 BPM | DIASTOLIC BLOOD PRESSURE: 80 MMHG | OXYGEN SATURATION: 98 % | BODY MASS INDEX: 22.05 KG/M2 | WEIGHT: 154 LBS

## 2018-10-31 DIAGNOSIS — I10 ESSENTIAL HYPERTENSION: Chronic | ICD-10-CM

## 2018-10-31 DIAGNOSIS — N17.9 AKI (ACUTE KIDNEY INJURY): ICD-10-CM

## 2018-10-31 DIAGNOSIS — N18.30 CKD (CHRONIC KIDNEY DISEASE) STAGE 3, GFR 30-59 ML/MIN: Primary | ICD-10-CM

## 2018-10-31 PROCEDURE — 1101F PT FALLS ASSESS-DOCD LE1/YR: CPT | Mod: CPTII,,, | Performed by: INTERNAL MEDICINE

## 2018-10-31 PROCEDURE — 3079F DIAST BP 80-89 MM HG: CPT | Mod: CPTII,,, | Performed by: INTERNAL MEDICINE

## 2018-10-31 PROCEDURE — 3077F SYST BP >= 140 MM HG: CPT | Mod: CPTII,,, | Performed by: INTERNAL MEDICINE

## 2018-10-31 PROCEDURE — 99999 PR PBB SHADOW E&M-EST. PATIENT-LVL III: CPT | Mod: PBBFAC,,, | Performed by: INTERNAL MEDICINE

## 2018-10-31 PROCEDURE — 99213 OFFICE O/P EST LOW 20 MIN: CPT | Mod: PBBFAC | Performed by: INTERNAL MEDICINE

## 2018-10-31 PROCEDURE — 99203 OFFICE O/P NEW LOW 30 MIN: CPT | Mod: S$PBB,,, | Performed by: INTERNAL MEDICINE

## 2018-10-31 PROCEDURE — 99499 UNLISTED E&M SERVICE: CPT | Mod: HCNC,S$GLB,, | Performed by: INTERNAL MEDICINE

## 2018-10-31 NOTE — PROGRESS NOTES
Subjective:       Patient ID: Rodolfo Messina is a 68 y.o. White male who presents for new evaluation of renal function       HPI   68M h/o HFrEF, MR s/p MVR (5/2018), CAD s/p CABG (5/2018), dysphagia s/p PEG, hypernatremia, anemia, SBE on rocephin / amp, presenting with hematuria after lackey was removed.    Patient with complicated recent medical hx, with prolonged 28d hospitalization 5/2018 after CABG / MVR with course complicated by SBE - started on rocephin / amp end date 7/13, dysphagia - s/p PEG, and urinary retention with lackey placement (recently removed 6/26 by urology). Pt was recently admitted to [facility] 6/23 for anemia, Hb 6.7 at that time, down from 7.0 6/20 and with dyspnea thought to be related to symptomatic anemia, pt mildly volume overloaded as well, was diuresed and transfused 2u PRBC with discharge Hb of 8.1   has mild pancytopenia. He has B cell NHL , low grade, with marrow involvement ( 5-10% marrow involved).    GI consulted 7/09/18 for positive FOBT on 7/08/18 and decreasing hemoglobin. CT Renal Stone Study performed on 7/09/18 showed a new 5.2 cm heterogeneous left psoas collection   Per above, pt saw urology 6/26 for indwelling lackey / urinary retention, underwent voiding trial and patient passed, lackey was removed, to continue flomax and stop doxazosin. Cystoscopy scheduled but never completed     Baseline serum crt was 1.1  1/2108 but then prolonged epriod of Travon with serum crt 1.9-2.9  Most recent 10/18./18 crt 1.4 no proteinuria or hematuria  Remote h/o nephrolithiasis   He continues to have c/o intermittent hesitancy with urinating, no burning, no hematuria, on flomax        Review of Systems   Constitutional: Positive for activity change, appetite change, fatigue and unexpected weight change. Negative for chills, diaphoresis and fever.   HENT: Negative for congestion, ear discharge, ear pain, facial swelling, hearing loss, nosebleeds, sinus pressure, sore throat and  "trouble swallowing.    Eyes: Negative for photophobia, pain, discharge, redness, itching and visual disturbance.   Respiratory: Negative for apnea, cough, chest tightness, shortness of breath and wheezing.    Cardiovascular: Negative for chest pain, palpitations and leg swelling.   Gastrointestinal: Negative for abdominal distention, abdominal pain, constipation, diarrhea and vomiting.   Endocrine: Negative for cold intolerance, heat intolerance, polydipsia and polyuria.   Genitourinary: Negative for decreased urine volume, difficulty urinating, dysuria, flank pain, frequency, hematuria, scrotal swelling, testicular pain and urgency.   Musculoskeletal: Negative for arthralgias, back pain, gait problem, joint swelling, myalgias, neck pain and neck stiffness.   Skin: Negative for color change, pallor, rash and wound.   Allergic/Immunologic: Negative for environmental allergies and food allergies.   Neurological: Negative for dizziness, tremors, seizures, syncope, facial asymmetry, speech difficulty, weakness, light-headedness, numbness and headaches.   Hematological: Negative for adenopathy. Does not bruise/bleed easily.   Psychiatric/Behavioral: Negative for agitation, behavioral problems, dysphoric mood and sleep disturbance. The patient is not nervous/anxious.        Objective:     Blood pressure (!) 150/80, pulse 74, height 5' 10" (1.778 m), weight 69.9 kg (154 lb), SpO2 98 %.      Physical Exam   Constitutional: He is oriented to person, place, and time. He appears well-developed and well-nourished. No distress.   NAD  Chronically ill,    HENT:   Head: Normocephalic and atraumatic.   Mouth/Throat: Oropharynx is clear and moist. No oropharyngeal exudate.   Eyes: Conjunctivae and EOM are normal. Pupils are equal, round, and reactive to light. Right eye exhibits no discharge. Left eye exhibits no discharge. No scleral icterus.   Neck: Normal range of motion. Neck supple. No JVD present. No tracheal deviation " present. No thyromegaly present.   Cardiovascular: Normal rate, regular rhythm and normal heart sounds. Exam reveals no gallop and no friction rub.   No murmur heard.  No murmur no gallop   Pulmonary/Chest: Effort normal and breath sounds normal. No stridor. No respiratory distress. He has no wheezes. He has no rales. He exhibits no tenderness.   Abdominal: Soft. Bowel sounds are normal. He exhibits no distension and no mass. There is no tenderness. There is no rebound and no guarding. No hernia.   Musculoskeletal: Normal range of motion. He exhibits no edema or tenderness.   Lymphadenopathy:     He has no cervical adenopathy.   Neurological: He is alert and oriented to person, place, and time. No cranial nerve deficit. He exhibits normal muscle tone. Coordination normal.   Skin: Skin is warm and dry. No rash noted. He is not diaphoretic. No erythema. No pallor.   Psychiatric: He has a normal mood and affect. His behavior is normal. Judgment and thought content normal.   Nursing note and vitals reviewed.      Assessment:       1. CKD (chronic kidney disease) stage 3, GFR 30-59 ml/min    2. ANNA (acute kidney injury)    3. Essential hypertension        Plan:       68M h/o HFrEF, MR s/p MVR (5/2018), CAD s/p CABG (5/2018), dysphagia s/p PEG, hypernatremia, anemia, SBE on rocephin / amp, presenting with hematuria after lackey was removed. With new dx of B cell NHL low grade  With CKD 3  Serum crt 1.4  Improving with no proteinuria nor hematuria     ANNA resolved  Secondary to sepsis, SBE with possible acute infectious GN, ALON from antibiotics,  Urinary obstruction    CKD 3  Baseline about 1.2-1.4  Est gfr 50-55 cc/min   secondary to  Scarring from ANNA, nephrosclerosis and arteriolosclerosis.  B cell infiltration of kidneys possible but with improving function and no hematuria nor proteinuria would monitor for now.  The patient is at low risk for progression of CKD over the next 5 years but tis may be modified by    co-morbidities and their treatments    HTN; appears elevated today but patient claims it si controlled and monitored  At cardiac rehab will defer to cardiology for now. No contraindication to RAAS inhibition at this time  If indicated    Metabolic bone disease check pth and vit d    anemia as per hematology/oncology    Urinary retention; still needs cystoscopy and f/u  referred today to urology     Metabolic acidosis : none    hyperkalemia; in past currently none    sodium top normal, encouraged to drink  1-2 more 16 oz bottles of water daily    Avoid NSAIDs    Plan   Urology referral  labs 4 mo  rtc 4 mo

## 2018-10-31 NOTE — LETTER
October 31, 2018      Yousuf Lockett MD  [address] [facility] [facility] [address] [facility]  [address]  [facility] [address]           [facility] - Nephrology  [address]  [place] [address]  Phone: [phone]  Fax: [phone]          Patient: Rodolfo Messina   MR Number: [id number]   YOB: 1950   Date of Visit: 10/31/2018       Dear Dr. Yousuf Lockett:    Thank you for referring Rodolfo Messina to me for evaluation. Attached you will find relevant portions of my assessment and plan of care.    If you have questions, please do not hesitate to call me. I look forward to following Rodolfo Messina along with you.    Sincerely,    Tanisha Sanchez MD    Enclosure  CC:  No Recipients    If you would like to receive this communication electronically, please contact [email or web address] or [phone] to request more information on [facility] Link access.    For providers and/or their staff who would like to refer a patient to [facility], please contact us through our one-stop-shop provider referral line, [facility], at [phone].    If you feel you have received this communication in error or would no longer like to receive these types of communications, please e-mail [email or web address]

## 2018-11-15 ENCOUNTER — TELEPHONE (OUTPATIENT)
Dept: HEMATOLOGY/ONCOLOGY | Facility: CLINIC | Age: 68
End: 2018-11-15

## 2018-11-15 ENCOUNTER — LAB VISIT (OUTPATIENT)
Dept: LAB | Facility: HOSPITAL | Age: 68
End: 2018-11-15
Attending: INTERNAL MEDICINE
Payer: MEDICARE

## 2018-11-15 DIAGNOSIS — D64.9 ANEMIA, UNSPECIFIED TYPE: ICD-10-CM

## 2018-11-15 DIAGNOSIS — C85.10 B-CELL LYMPHOMA, UNSPECIFIED B-CELL LYMPHOMA TYPE, UNSPECIFIED BODY REGION: ICD-10-CM

## 2018-11-15 LAB
ALBUMIN SERPL BCP-MCNC: 4.1 G/DL
ALP SERPL-CCNC: 62 U/L
ALT SERPL W/O P-5'-P-CCNC: 29 U/L
ANION GAP SERPL CALC-SCNC: 7 MMOL/L
AST SERPL-CCNC: 31 U/L
BASOPHILS # BLD AUTO: 0.03 K/UL
BASOPHILS NFR BLD: 0.8 %
BILIRUB SERPL-MCNC: 0.9 MG/DL
BUN SERPL-MCNC: 22 MG/DL
CALCIUM SERPL-MCNC: 9.7 MG/DL
CHLORIDE SERPL-SCNC: 110 MMOL/L
CO2 SERPL-SCNC: 27 MMOL/L
CREAT SERPL-MCNC: 1.54 MG/DL
DIFFERENTIAL METHOD: ABNORMAL
EOSINOPHIL # BLD AUTO: 0.2 K/UL
EOSINOPHIL NFR BLD: 5.5 %
ERYTHROCYTE [DISTWIDTH] IN BLOOD BY AUTOMATED COUNT: 13.8 %
EST. GFR  (AFRICAN AMERICAN): 52.8 ML/MIN/1.73 M^2
EST. GFR  (NON AFRICAN AMERICAN): 45.7 ML/MIN/1.73 M^2
GLUCOSE SERPL-MCNC: 98 MG/DL
HAPTOGLOB SERPL-MCNC: 101 MG/DL
HCT VFR BLD AUTO: 31.8 %
HGB BLD-MCNC: 10.1 G/DL
LDH SERPL L TO P-CCNC: 172 U/L
LYMPHOCYTES # BLD AUTO: 0.9 K/UL
LYMPHOCYTES NFR BLD: 23.6 %
MCH RBC QN AUTO: 31.8 PG
MCHC RBC AUTO-ENTMCNC: 31.8 G/DL
MCV RBC AUTO: 100 FL
MONOCYTES # BLD AUTO: 0.4 K/UL
MONOCYTES NFR BLD: 9.4 %
NEUTROPHILS # BLD AUTO: 2.3 K/UL
NEUTROPHILS NFR BLD: 60.7 %
PLATELET # BLD AUTO: 135 K/UL
PMV BLD AUTO: 10.6 FL
POTASSIUM SERPL-SCNC: 5.1 MMOL/L
PROT SERPL-MCNC: 7 G/DL
RBC # BLD AUTO: 3.18 M/UL
SODIUM SERPL-SCNC: 144 MMOL/L
WBC # BLD AUTO: 3.85 K/UL

## 2018-11-15 PROCEDURE — 83010 ASSAY OF HAPTOGLOBIN QUANT: CPT | Mod: HCWC,PO

## 2018-11-15 PROCEDURE — 85025 COMPLETE CBC W/AUTO DIFF WBC: CPT | Mod: HCWC,PO

## 2018-11-15 PROCEDURE — 83615 LACTATE (LD) (LDH) ENZYME: CPT | Mod: HCWC

## 2018-11-15 PROCEDURE — 80053 COMPREHEN METABOLIC PANEL: CPT | Mod: HCWC,PO

## 2018-11-15 PROCEDURE — 36415 COLL VENOUS BLD VENIPUNCTURE: CPT | Mod: HCWC,PO

## 2018-11-15 NOTE — TELEPHONE ENCOUNTER
I called the patient to let him know his blood counts were stable.  The patient expressed understanding.  All questions were answered to his satisfaction.    López Go MD  Hematology and Oncology Fellow PGY-VI  Pager:[phone]

## 2018-11-26 ENCOUNTER — TELEPHONE (OUTPATIENT)
Dept: HEMATOLOGY/ONCOLOGY | Facility: CLINIC | Age: 68
End: 2018-11-26

## 2018-11-26 NOTE — TELEPHONE ENCOUNTER
----- Message from Nathan Diaz RN sent at 11/26/2018  4:36 PM CST -----  Contact: Pt      ----- Message -----  From: Shani Mcqueen  Sent: 11/26/2018   4:32 PM  To: Abbi RODRIGUEZ Staff    Pt called to speak with Dr Go Have some questions  Callback#[phone]  Thank You  Joi

## 2018-11-26 NOTE — TELEPHONE ENCOUNTER
I called the patient and he stated he had two episodes of sweating in the evening that he wanted to let me know about.  He said the episodes did not last long and did not cause him to drench his clothes or sheets.  He denies feeling unwell.  He denies fever, chill, change in appetite.  I informed him to keep a close tab on his symptoms and to contact us if his symptoms continue.  The patient expressed understanding.  All questions were answered to his satisfaction.    López Go MD  Hematology and Oncology Fellow PGY-VI  Pager:[phone]

## 2018-12-04 ENCOUNTER — TELEPHONE (OUTPATIENT)
Dept: FAMILY MEDICINE | Facility: CLINIC | Age: 68
End: 2018-12-04

## 2018-12-04 DIAGNOSIS — L98.9 SKIN LESION: Primary | ICD-10-CM

## 2018-12-04 DIAGNOSIS — L29.9 PRURITUS: ICD-10-CM

## 2018-12-04 NOTE — TELEPHONE ENCOUNTER
----- Message from Antoine Livingston sent at 12/4/2018  2:18 PM CST -----  Contact: [phone]  Patient is requesting a referral to see a dermatologist. Please call.    I spoke with the pt wife  Pt has bumps on his head and he keeps itching and picking them. [facility]  Please place referral and I advised wife we will call back to schedule this

## 2018-12-13 ENCOUNTER — LAB VISIT (OUTPATIENT)
Dept: LAB | Facility: HOSPITAL | Age: 68
End: 2018-12-13
Attending: INTERNAL MEDICINE
Payer: MEDICARE

## 2018-12-13 ENCOUNTER — TELEPHONE (OUTPATIENT)
Dept: HEMATOLOGY/ONCOLOGY | Facility: CLINIC | Age: 68
End: 2018-12-13

## 2018-12-13 DIAGNOSIS — C85.10 B-CELL LYMPHOMA, UNSPECIFIED B-CELL LYMPHOMA TYPE, UNSPECIFIED BODY REGION: ICD-10-CM

## 2018-12-13 DIAGNOSIS — D64.9 ANEMIA, UNSPECIFIED TYPE: ICD-10-CM

## 2018-12-13 LAB
ALBUMIN SERPL BCP-MCNC: 3.9 G/DL
ALP SERPL-CCNC: 56 U/L
ALT SERPL W/O P-5'-P-CCNC: 39 U/L
ANION GAP SERPL CALC-SCNC: 5 MMOL/L
AST SERPL-CCNC: 28 U/L
BASOPHILS # BLD AUTO: 0.01 K/UL
BASOPHILS NFR BLD: 0.3 %
BILIRUB SERPL-MCNC: 0.8 MG/DL
BUN SERPL-MCNC: 29 MG/DL
CALCIUM SERPL-MCNC: 9.4 MG/DL
CHLORIDE SERPL-SCNC: 108 MMOL/L
CO2 SERPL-SCNC: 29 MMOL/L
CREAT SERPL-MCNC: 1.43 MG/DL
DIFFERENTIAL METHOD: ABNORMAL
EOSINOPHIL # BLD AUTO: 0.2 K/UL
EOSINOPHIL NFR BLD: 5.1 %
ERYTHROCYTE [DISTWIDTH] IN BLOOD BY AUTOMATED COUNT: 13.3 %
EST. GFR  (AFRICAN AMERICAN): 57.8 ML/MIN/1.73 M^2
EST. GFR  (NON AFRICAN AMERICAN): 50 ML/MIN/1.73 M^2
GLUCOSE SERPL-MCNC: 155 MG/DL
HAPTOGLOB SERPL-MCNC: 94 MG/DL
HCT VFR BLD AUTO: 31.9 %
HGB BLD-MCNC: 10.1 G/DL
LDH SERPL L TO P-CCNC: 165 U/L
LYMPHOCYTES # BLD AUTO: 0.7 K/UL
LYMPHOCYTES NFR BLD: 19.7 %
MCH RBC QN AUTO: 31.7 PG
MCHC RBC AUTO-ENTMCNC: 31.7 G/DL
MCV RBC AUTO: 100 FL
MONOCYTES # BLD AUTO: 0.3 K/UL
MONOCYTES NFR BLD: 7.8 %
NEUTROPHILS # BLD AUTO: 2.5 K/UL
NEUTROPHILS NFR BLD: 67.1 %
PLATELET # BLD AUTO: 131 K/UL
PMV BLD AUTO: 10.6 FL
POTASSIUM SERPL-SCNC: 4.7 MMOL/L
PROT SERPL-MCNC: 6.8 G/DL
RBC # BLD AUTO: 3.19 M/UL
SODIUM SERPL-SCNC: 142 MMOL/L
WBC # BLD AUTO: 3.71 K/UL

## 2018-12-13 PROCEDURE — 80053 COMPREHEN METABOLIC PANEL: CPT | Mod: PO

## 2018-12-13 PROCEDURE — 85025 COMPLETE CBC W/AUTO DIFF WBC: CPT | Mod: PO

## 2018-12-13 PROCEDURE — 36415 COLL VENOUS BLD VENIPUNCTURE: CPT | Mod: PO

## 2018-12-13 PROCEDURE — 83010 ASSAY OF HAPTOGLOBIN QUANT: CPT | Mod: PO

## 2018-12-13 PROCEDURE — 83615 LACTATE (LD) (LDH) ENZYME: CPT

## 2018-12-13 RX ORDER — ATORVASTATIN CALCIUM 40 MG/1
40 TABLET, FILM COATED ORAL DAILY
Qty: 90 TABLET | Refills: 3 | Status: SHIPPED | OUTPATIENT
Start: 2018-12-13 | End: 2019-10-09 | Stop reason: SDUPTHER

## 2018-12-13 NOTE — TELEPHONE ENCOUNTER
----- Message from Ct Bates sent at 12/13/2018 11:23 AM CST -----  Contact: Vera (wife)/ [phone]  Patient called in to get prescription refill. Please call.    atorvastatin (LIPITOR) 40 MG tablet    [facility] - [place], [place] - [address]

## 2018-12-13 NOTE — TELEPHONE ENCOUNTER
I called the patient and was unable to leave a message.    López Go MD PGY-VI  Hematology and Oncology Fellow  Pager:[phone]

## 2018-12-14 ENCOUNTER — TELEPHONE (OUTPATIENT)
Dept: HEMATOLOGY/ONCOLOGY | Facility: CLINIC | Age: 68
End: 2018-12-14

## 2018-12-18 ENCOUNTER — TELEPHONE (OUTPATIENT)
Dept: FAMILY MEDICINE | Facility: CLINIC | Age: 68
End: 2018-12-18

## 2018-12-18 ENCOUNTER — TELEPHONE (OUTPATIENT)
Dept: HEMATOLOGY/ONCOLOGY | Facility: CLINIC | Age: 68
End: 2018-12-18

## 2018-12-18 NOTE — TELEPHONE ENCOUNTER
----- Message from Shani Mcqueen sent at 12/18/2018 12:50 PM CST -----  Contact: pt wife   Pt wife called to speak with nurse about test results  Callback#[phone]  Thank You  ALEJANDRO Mcqueen

## 2018-12-18 NOTE — TELEPHONE ENCOUNTER
I called the patient and let him know that his labs looked stable concerning his hemoglobin, WBC, and platelets.  The patient expressed understanding.  All questions were answered to his satisfaction.    López Go MD  Hematology and Oncology Fellow PGY-VI  Pager:[phone]

## 2018-12-18 NOTE — TELEPHONE ENCOUNTER
----- Message from Cassia Sahu sent at 12/18/2018  9:59 AM CST -----  Contact: [phone] /self  Patient is requesting a call back regarding his test results. Thanks     I spoke with the pt wife - dr wayne ordered labs and they were drawn last week.   He has not heard from the doctor with the results and he is now getting nervous. Could you review them and advise?

## 2018-12-31 ENCOUNTER — INITIAL CONSULT (OUTPATIENT)
Dept: DERMATOLOGY | Facility: CLINIC | Age: 68
End: 2018-12-31
Payer: MEDICARE

## 2018-12-31 DIAGNOSIS — L21.9 SEBORRHEIC DERMATITIS: ICD-10-CM

## 2018-12-31 DIAGNOSIS — L72.9 SUBCUTANEOUS CYST: ICD-10-CM

## 2018-12-31 DIAGNOSIS — L57.0 AK (ACTINIC KERATOSIS): Primary | ICD-10-CM

## 2018-12-31 DIAGNOSIS — L72.0 EIC (EPIDERMAL INCLUSION CYST): ICD-10-CM

## 2018-12-31 PROCEDURE — 99202 OFFICE O/P NEW SF 15 MIN: CPT | Mod: 25,S$GLB,, | Performed by: NURSE PRACTITIONER

## 2018-12-31 PROCEDURE — 99999 PR PBB SHADOW E&M-EST. PATIENT-LVL II: CPT | Mod: PBBFAC,,, | Performed by: NURSE PRACTITIONER

## 2018-12-31 PROCEDURE — 1101F PT FALLS ASSESS-DOCD LE1/YR: CPT | Mod: CPTII,S$GLB,, | Performed by: NURSE PRACTITIONER

## 2018-12-31 PROCEDURE — 17004 DESTROY PREMAL LESIONS 15/>: CPT | Mod: S$GLB,,, | Performed by: NURSE PRACTITIONER

## 2018-12-31 RX ORDER — KETOCONAZOLE 20 MG/ML
SHAMPOO, SUSPENSION TOPICAL
Qty: 120 ML | Refills: 5 | Status: SHIPPED | OUTPATIENT
Start: 2018-12-31 | End: 2019-10-14

## 2018-12-31 NOTE — PATIENT INSTRUCTIONS

## 2018-12-31 NOTE — LETTER
December 31, 2018      Deric Claudio MD  [address] [place] [id number]           [facility] - Dermatology  [address] [address]  Phone: [phone]  Fax: [phone]          Patient: Rodolfo Messina   MR Number: [id number]   YOB: 1950   Date of Visit: 12/31/2018       Dear Dr. Deric Claudio:    Thank you for referring Rodolfo Messina to me for evaluation. Attached you will find relevant portions of my assessment and plan of care.    If you have questions, please do not hesitate to call me. I look forward to following Rodolfo Messina along with you.    Sincerely,    Kristen Abad, NP    Enclosure  CC:  No Recipients    If you would like to receive this communication electronically, please contact [email or web address] or [phone] to request more information on [facility] Link access.    For providers and/or their staff who would like to refer a patient to [facility], please contact us through our one-stop-shop provider referral line, [facility] , at [phone].    If you feel you have received this communication in error or would no longer like to receive these types of communications, please e-mail [email or web address]

## 2018-12-31 NOTE — PROGRESS NOTES
Subjective:       Patient ID:  Rodolfo Messina is a 68 y.o. male who presents for   Chief Complaint   Patient presents with    Spot     scalp     Spot  - Initial  Affected locations: scalp  Signs / symptoms: irritated  Severity: mild  Timing: constant  Aggravated by: nothing  Relieving factors/Treatments tried: nothing  Improvement on treatment: no relief      New to Derm  No previous h/o skin cancer      Review of Systems   Constitutional: Positive for night sweats. Negative for fever, chills, weight loss, weight gain, fatigue and malaise.   Skin: Negative for daily sunscreen use.   Hematologic/Lymphatic: Bruises/bleeds easily.        H/o B cell Lymphoma          Objective:    Physical Exam   Constitutional: He appears well-developed and well-nourished.   Neurological: He is alert and oriented to person, place, and time.   Psychiatric: He has a normal mood and affect.   Skin:   Areas Examined (abnormalities noted in diagram):   Scalp / Hair Palpated and Inspected  Head / Face Inspection Performed  Neck Inspection Performed  Chest / Axilla Inspection Performed  Abdomen Inspection Performed  Back Inspection Performed  RUE Inspected  LUE Inspection Performed                       Diagram Legend     Erythematous scaling macule/papule c/w actinic keratosis       Vascular papule c/w angioma      Pigmented verrucoid papule/plaque c/w seborrheic keratosis      Yellow umbilicated papule c/w sebaceous hyperplasia      Irregularly shaped tan macule c/w lentigo     1-2 mm smooth white papules consistent with Milia      Movable subcutaneous cyst with punctum c/w epidermal inclusion cyst      Subcutaneous movable cyst c/w pilar cyst      Firm pink to brown papule c/w dermatofibroma      Pedunculated fleshy papule(s) c/w skin tag(s)      Evenly pigmented macule c/w junctional nevus     Mildly variegated pigmented, slightly irregular-bordered macule c/w mildly atypical nevus      Flesh colored to evenly pigmented papule c/w  intradermal nevus       Pink pearly papule/plaque c/w basal cell carcinoma      Erythematous hyperkeratotic cursted plaque c/w SCC      Surgical scar with no sign of skin cancer recurrence      Open and closed comedones      Inflammatory papules and pustules      Verrucoid papule consistent consistent with wart     Erythematous eczematous patches and plaques     Dystrophic onycholytic nail with subungual debris c/w onychomycosis     Umbilicated papule    Erythematous-base heme-crusted tan verrucoid plaque consistent with inflamed seborrheic keratosis     Erythematous Silvery Scaling Plaque c/w Psoriasis     See annotation      Assessment / Plan:        AK (actinic keratosis)  Cryosurgery Procedure Note    Verbal consent from the patient is obtained and the patient is aware of the precancerous quality and need for treatment of these lesions. Liquid nitrogen cryosurgery is applied to the 18 actinic keratoses, as detailed in the physical exam, to produce a freeze injury. The patient is aware that blisters may form and is instructed on wound care with gentle cleansing and use of vaseline ointment to keep moist until healed. The patient is supplied a handout on cryosurgery and is instructed to call if lesions do not completely resolve.    HAK to scalp, x1 large freeze to affected area on scalp   RTC in 6 weeks for re-assessment w/ MD, discussed possible biopsy      Seborrheic dermatitis- scalp  -     ketoconazole (NIZORAL) 2 % shampoo; Wash hair with medicated shampoo at least 2x/week - let sit on scalp at least 5 minutes prior to rinsing  Dispense: 120 mL; Refill: 5    Subcutaneous cyst  Reassurance given to patient. No treatment is necessary.   Treatment of benign, asymptomatic lesions may be considered cosmetic.      EIC (epidermal inclusion cyst)  Reassurance given to patient. No treatment is necessary.   Treatment of benign, asymptomatic lesions may be considered cosmetic.             Follow-up in about 6 weeks  (around 2/11/2019) for skin cancer screening & f/u HAK to scalp.

## 2019-01-03 NOTE — ASSESSMENT & PLAN NOTE
--concern for HCAP vs pulmonary edema  --2D echo performed, read pending. Will repeat BNP today. Patient has no previously known history of heart failure. Additional diuresis as clinical picture dictates  --will broaden out antibiotics for HCAP and atypical coverage: Vanc, unasyn and azithromycin  --checking sputum cx, RSV panel & Bcx  --follow daily chest xrays  --continue comfort flow; wean as tolerated   Chano is a 10 year old boy with no past medical history presenting with bilateral facial swelling/pain over the angle of the mandible with sudden onset this evening around 630. No rhinorrhea, cough, diarrhea, vomiting, or rashes. No known allergies. Patient was seen at urgent care who administered IV solumedrol at 730PM. Patient's swelling and pain have improved somewhat. Immunizations up to date including flu. Chano is a 10 year old boy with no past medical history presenting with bilateral facial swelling/pain over the angle of the mandible with sudden onset this evening around 630. No rhinorrhea, cough, diarrhea, vomiting, or rashes. No known allergies. Patient was seen at urgent care who administered IV solumedrol at 730PM. Patient's swelling and pain have improved somewhat. Immunizations up to date including flu.  AF.

## 2019-01-10 ENCOUNTER — LAB VISIT (OUTPATIENT)
Dept: LAB | Facility: HOSPITAL | Age: 69
End: 2019-01-10
Attending: INTERNAL MEDICINE
Payer: MEDICARE

## 2019-01-10 ENCOUNTER — OFFICE VISIT (OUTPATIENT)
Dept: HEMATOLOGY/ONCOLOGY | Facility: CLINIC | Age: 69
End: 2019-01-10
Payer: MEDICARE

## 2019-01-10 VITALS
HEIGHT: 70 IN | BODY MASS INDEX: 22.98 KG/M2 | OXYGEN SATURATION: 100 % | HEART RATE: 131 BPM | TEMPERATURE: 98 F | SYSTOLIC BLOOD PRESSURE: 183 MMHG | RESPIRATION RATE: 18 BRPM | WEIGHT: 160.5 LBS | DIASTOLIC BLOOD PRESSURE: 81 MMHG

## 2019-01-10 DIAGNOSIS — D64.9 ANEMIA, UNSPECIFIED TYPE: ICD-10-CM

## 2019-01-10 DIAGNOSIS — C85.10 B-CELL LYMPHOMA, UNSPECIFIED B-CELL LYMPHOMA TYPE, UNSPECIFIED BODY REGION: ICD-10-CM

## 2019-01-10 DIAGNOSIS — C85.10 B-CELL LYMPHOMA, UNSPECIFIED B-CELL LYMPHOMA TYPE, UNSPECIFIED BODY REGION: Primary | ICD-10-CM

## 2019-01-10 LAB
ALBUMIN SERPL BCP-MCNC: 3.6 G/DL
ALP SERPL-CCNC: 79 U/L
ALT SERPL W/O P-5'-P-CCNC: 33 U/L
ANION GAP SERPL CALC-SCNC: 6 MMOL/L
AST SERPL-CCNC: 27 U/L
BASOPHILS # BLD AUTO: 0.02 K/UL
BASOPHILS NFR BLD: 0.4 %
BILIRUB SERPL-MCNC: 1.1 MG/DL
BUN SERPL-MCNC: 27 MG/DL
CALCIUM SERPL-MCNC: 9.8 MG/DL
CHLORIDE SERPL-SCNC: 110 MMOL/L
CO2 SERPL-SCNC: 26 MMOL/L
CREAT SERPL-MCNC: 1.6 MG/DL
DIFFERENTIAL METHOD: ABNORMAL
EOSINOPHIL # BLD AUTO: 0.2 K/UL
EOSINOPHIL NFR BLD: 4.1 %
ERYTHROCYTE [DISTWIDTH] IN BLOOD BY AUTOMATED COUNT: 13.2 %
EST. GFR  (AFRICAN AMERICAN): 50.4 ML/MIN/1.73 M^2
EST. GFR  (NON AFRICAN AMERICAN): 43.6 ML/MIN/1.73 M^2
GLUCOSE SERPL-MCNC: 137 MG/DL
HAPTOGLOB SERPL-MCNC: 111 MG/DL
HCT VFR BLD AUTO: 32.2 %
HGB BLD-MCNC: 10.5 G/DL
IMM GRANULOCYTES # BLD AUTO: 0.01 K/UL
IMM GRANULOCYTES NFR BLD AUTO: 0.2 %
LDH SERPL L TO P-CCNC: 188 U/L
LYMPHOCYTES # BLD AUTO: 0.9 K/UL
LYMPHOCYTES NFR BLD: 19.7 %
MCH RBC QN AUTO: 33.3 PG
MCHC RBC AUTO-ENTMCNC: 32.6 G/DL
MCV RBC AUTO: 102 FL
MONOCYTES # BLD AUTO: 0.4 K/UL
MONOCYTES NFR BLD: 9.2 %
NEUTROPHILS # BLD AUTO: 3.1 K/UL
NEUTROPHILS NFR BLD: 66.4 %
NRBC BLD-RTO: 0 /100 WBC
PLATELET # BLD AUTO: 120 K/UL
PMV BLD AUTO: 11 FL
POTASSIUM SERPL-SCNC: 4.8 MMOL/L
PROT SERPL-MCNC: 6.6 G/DL
RBC # BLD AUTO: 3.15 M/UL
SODIUM SERPL-SCNC: 142 MMOL/L
WBC # BLD AUTO: 4.68 K/UL

## 2019-01-10 PROCEDURE — 85025 COMPLETE CBC W/AUTO DIFF WBC: CPT

## 2019-01-10 PROCEDURE — 83010 ASSAY OF HAPTOGLOBIN QUANT: CPT

## 2019-01-10 PROCEDURE — 1101F PR PT FALLS ASSESS DOC 0-1 FALLS W/OUT INJ PAST YR: ICD-10-PCS | Mod: CPTII,GC,S$GLB, | Performed by: INTERNAL MEDICINE

## 2019-01-10 PROCEDURE — 99214 PR OFFICE/OUTPT VISIT, EST, LEVL IV, 30-39 MIN: ICD-10-PCS | Mod: GC,S$GLB,, | Performed by: INTERNAL MEDICINE

## 2019-01-10 PROCEDURE — 99999 PR PBB SHADOW E&M-EST. PATIENT-LVL III: ICD-10-PCS | Mod: PBBFAC,GC,,

## 2019-01-10 PROCEDURE — 3079F PR MOST RECENT DIASTOLIC BLOOD PRESSURE 80-89 MM HG: ICD-10-PCS | Mod: CPTII,GC,S$GLB, | Performed by: INTERNAL MEDICINE

## 2019-01-10 PROCEDURE — 36415 COLL VENOUS BLD VENIPUNCTURE: CPT

## 2019-01-10 PROCEDURE — 3079F DIAST BP 80-89 MM HG: CPT | Mod: CPTII,GC,S$GLB, | Performed by: INTERNAL MEDICINE

## 2019-01-10 PROCEDURE — 3077F SYST BP >= 140 MM HG: CPT | Mod: CPTII,GC,S$GLB, | Performed by: INTERNAL MEDICINE

## 2019-01-10 PROCEDURE — 99214 OFFICE O/P EST MOD 30 MIN: CPT | Mod: GC,S$GLB,, | Performed by: INTERNAL MEDICINE

## 2019-01-10 PROCEDURE — 80053 COMPREHEN METABOLIC PANEL: CPT

## 2019-01-10 PROCEDURE — 99999 PR PBB SHADOW E&M-EST. PATIENT-LVL III: CPT | Mod: PBBFAC,GC,,

## 2019-01-10 PROCEDURE — 1101F PT FALLS ASSESS-DOCD LE1/YR: CPT | Mod: CPTII,GC,S$GLB, | Performed by: INTERNAL MEDICINE

## 2019-01-10 PROCEDURE — 83615 LACTATE (LD) (LDH) ENZYME: CPT

## 2019-01-10 PROCEDURE — 3077F PR MOST RECENT SYSTOLIC BLOOD PRESSURE >= 140 MM HG: ICD-10-PCS | Mod: CPTII,GC,S$GLB, | Performed by: INTERNAL MEDICINE

## 2019-01-10 NOTE — PROGRESS NOTES
thirving counts better  lgbcl  w and w  Labs 3 months lapalce  Labs md appt in 6 months with fred  Reviewed Symptoms to watch out for and call clinic

## 2019-01-10 NOTE — PROGRESS NOTES
PATIENT: Rodolfo Messina  MRN: [id number]  DATE: 1/10/2019      Diagnosis:   1. B-cell lymphoma, unspecified B-cell lymphoma type, unspecified body region    2. Anemia, unspecified type        Chief Complaint: No chief complaint on file.      Oncologic History:      Oncologic History     Oncologic Treatment     Pathology       Subjective:    Interval History: Mr. Messina is a 68 y.o. male who presents for follow up for low grade lymphoma.  Since the last clinic visit the patient states he has been very active and has had more energy.  He continues to eat well and has gained weight.  The patient denies fever, chills, night sweats, weight loss, new lumps or bumps, easy bruising or bleeding.    Initial History: Pt has a h/o anemia documented in the [facility] sys[facility]tem since 2015 with decline in 1/2018, HFrEF, MVR 5/2018, CAD s/p CABG 5/2018, dysphagia developed after CABG, chronic lackey who presents to clinic for establishment of care for anemia and recently diagnosed B-cell lymphoma, non hodgkin's lymphoma.  The patient recently was admitted to the hospital from 6/29-7/13  During the admission the patient was seen to have supra therapeutic INR at 4.1 with hemoglobin of 6.6.  The patient was taken off of coumadin on 7/09/18.  GI consulted 7/09/18 for positive FOBT on 7/08/18 and decreasing hemoglobin. CT Renal Stone Study performed on 7/09/18 showed a new 5.2 cm heterogeneous left psoas collection concerning for blood, moderate bilateral pleural effusions, postsurgical change of mitral valve replacement, gastrojejunostomy tube, right renal cyst, bladder diverticulum, diverticulosis, and aortic ectasia and atherosclerosis.  VCE was performed on 7/11/18 and showed no obvious signs of bleeding.  Heme/Onc consulted for persistent and worsening anemia on 7/13/18.  BM biopsy was performed at that time given his chronic history of anemia and unclear etiology. BM biopsy from 7/13/18 returned positive for low grade B-cell lymphoma  involving 5-10% of the marrow per discussion with Dr. Dhaliwal.  The patient had been previously worked up for anemia with EGD and colonoscopy on 2/01/18 showing non bleeding erosive gastritis, internal hemorrhoids, and diverticulosis.  In addition the patient had an SPEP checked which was normal and normal B-12 and folate in the past.    Past Medical History:   Past Medical History:   Diagnosis Date    ANNA (acute kidney injury) 2/22/2018    Anemia     Anxiety     Arthritis     BPH (benign prostatic hyperplasia)     CHF (congestive heart failure) 5/23/2018    Coarse tremors     Coronary artery disease of native artery of native heart with stable angina pectoris 5/21/2018    Diverticulosis     Encounter for blood transfusion     Endocarditis     Hypertension     Mechanical heart valve present     Non Hodgkin's lymphoma 8/2/2018    Urinary retention        Past Surgical HIstory:   Past Surgical History:   Procedure Laterality Date    AORTOCORONARY BYPASS-CABG N/A 5/25/2018    Performed by [facility] Go [facility] at [facility] OR 2ND FLR    APPENDECTOMY      COLONOSCOPY      COLONOSCOPY N/A 2/1/2018    Performed by Richar Payan MD at [facility] ENDO (4TH FLR)    ESOPHAGOGASTRODUODENOSCOPY (EGD) N/A 2/1/2018    Performed by Richar Payan MD at [facility] ENDO (4TH FLR)    EYE SURGERY Right     cataract extraction    FINGER SURGERY      FRACTURE SURGERY Right     index finger    Mitral Valve Replacement N/A 5/25/2018    Performed by [facility] at [facility] OR 2ND FLR    TONSILLECTOMY      TRANSESOPHAGEAL ECHOCARDIOGRAM (SOWMYA) N/A 2/27/2018    Performed by [facility] at [facility] CATH LAB       Family History:   Family History   Problem Relation Age of Onset    Stroke Mother     Heart disease Mother         CABG    Heart disease Father         CABG    No Known Problems Sister     No Known Problems Brother     Melanoma Neg Hx        Social History:  reports that  has never smoked. he has never used  smokeless tobacco. He reports that he drinks alcohol. He reports that he does not use drugs.    Allergies:  Review of patient's allergies indicates:   Allergen Reactions    Shellfish containing products     Augmentin [amoxicillin-pot clavulanate]      Patient felt that it raised BP and requested to have it added as intolerance. Tolerated unasyn and ampicillin.    Ciprofloxacin     Flagyl [metronidazole]     Lisinopril Other (See Comments)     cough    Mysoline [primidone]     Omnicef [cefdinir]        Medications:  Current Outpatient Medications   Medication Sig Dispense Refill    ascorbic acid, vitamin C, (VITAMIN C) 1000 MG tablet Take 1,000 mg by mouth every other day.      aspirin 81 MG Chew 4 tablets (324 mg total) by Per G Tube route once daily.  0    atorvastatin (LIPITOR) 40 MG tablet 1 tablet (40 mg total) by Per G Tube route once daily. 90 tablet 3    citalopram (CELEXA) 20 MG tablet Take 1 tablet (20 mg total) by mouth once daily. 30 tablet 1    co-enzyme Q-10 30 mg capsule       fluticasone (FLONASE) 50 mcg/actuation nasal spray 1 spray (50 mcg total) by Each Nare route once daily. 16 g 0    gabapentin (NEURONTIN) 100 MG capsule Take 1 capsule (100 mg total) by mouth 3 (three) times daily. 90 capsule 11    ketoconazole (NIZORAL) 2 % shampoo Wash hair with medicated shampoo at least 2x/week - let sit on scalp at least 5 minutes prior to rinsing 120 mL 5    lactose-reduced food (ENSURE HIGH PROTEIN-MUSCLE ORAL) Take by mouth 2 (two) times daily.      loratadine (CLARITIN) 10 mg tablet Take 10 mg by mouth once daily.      melatonin 10 mg Tab Take by mouth.      mirtazapine (REMERON) 30 MG tablet Take 1 tablet (30 mg total) by mouth every evening. 90 tablet 3    multivit-min/FA/lycopen/lutein (ADULTS 50 PLUS ORAL) Take by mouth every other day.       pantoprazole (PROTONIX) 40 MG tablet Take 1 tablet (40 mg total) by mouth once daily. 30 tablet 5    tamsulosin (FLOMAX) 0.4 mg Cap 1  "capsule (0.4 mg total) by Per G Tube route once daily. 30 capsule 11     No current facility-administered medications for this visit.      Review of Systems   Constitutional: Negative for chills, diaphoresis, fever and weight loss.   Respiratory: Negative for cough, sputum production and shortness of breath.    Cardiovascular: Negative for chest pain and leg swelling.   Gastrointestinal: Negative for abdominal pain, constipation, diarrhea, nausea and vomiting.   Neurological: Negative for weakness and headaches.   Endo/Heme/Allergies: Does not bruise/bleed easily.       ECOG Performance Status: 1   Objective:      Vitals:   Vitals:    01/10/19 1036   BP: (!) 183/81   Pulse: (!) 131   Resp: 18   Temp: 98.2 °F (36.8 °C)   SpO2: 100%   Weight: 72.8 kg (160 lb 7.9 oz)   Height: 5' 10" (1.778 m)     BMI: Body mass index is 23.03 kg/m².    Physical Exam   Constitutional: He is oriented to person, place, and time and well-developed, well-nourished, and in no distress.   HENT:   Head: Normocephalic and atraumatic.   Mouth/Throat: Oropharynx is clear and moist. No oropharyngeal exudate.   Eyes: Pupils are equal, round, and reactive to light. Right eye exhibits no discharge. Left eye exhibits no discharge. No scleral icterus.   Neck: Normal range of motion. No JVD present.   Cardiovascular: Normal rate, regular rhythm and intact distal pulses. Exam reveals no gallop and no friction rub.   No murmur heard.  Pulmonary/Chest: Effort normal and breath sounds normal. No respiratory distress. He has no wheezes. He has no rales. He exhibits no tenderness.   Abdominal: Soft. Bowel sounds are normal. He exhibits no distension and no mass. There is no tenderness. There is no rebound and no guarding.   Musculoskeletal: Normal range of motion. He exhibits no edema or tenderness.   Lymphadenopathy:        Head (right side): No submental and no submandibular adenopathy present.        Head (left side): No submental and no submandibular " adenopathy present.     He has no cervical adenopathy.     He has no axillary adenopathy.        Right: No inguinal and no supraclavicular adenopathy present.        Left: No inguinal and no supraclavicular adenopathy present.   Neurological: He is alert and oriented to person, place, and time.   Skin: Skin is warm and dry. No rash noted. He is not diaphoretic. No erythema. No pallor.     Laboratory Data:  Lab Visit on 01/10/2019   Component Date Value Ref Range Status    Sodium 01/10/2019 142  136 - 145 mmol/L Final    Potassium 01/10/2019 4.8  3.5 - 5.1 mmol/L Final    Chloride 01/10/2019 110  95 - 110 mmol/L Final    CO2 01/10/2019 26  23 - 29 mmol/L Final    Glucose 01/10/2019 137* 70 - 110 mg/dL Final    BUN, Bld 01/10/2019 27* 8 - 23 mg/dL Final    Creatinine 01/10/2019 1.6* 0.5 - 1.4 mg/dL Final    Calcium 01/10/2019 9.8  8.7 - 10.5 mg/dL Final    Total Protein 01/10/2019 6.6  6.0 - 8.4 g/dL Final    Albumin 01/10/2019 3.6  3.5 - 5.2 g/dL Final    Total Bilirubin 01/10/2019 1.1* 0.1 - 1.0 mg/dL Final    Comment: For infants and newborns, interpretation of results should be based  on gestational age, weight and in agreement with clinical  observations.  Premature Infant recommended reference ranges:  Up to 24 hours.............<8.0 mg/dL  Up to 48 hours............<12.0 mg/dL  3-5 days..................<15.0 mg/dL  6-29 days.................<15.0 mg/dL      Alkaline Phosphatase 01/10/2019 79  55 - 135 U/L Final    AST 01/10/2019 27  10 - 40 U/L Final    ALT 01/10/2019 33  10 - 44 U/L Final    Anion Gap 01/10/2019 6* 8 - 16 mmol/L Final    eGFR if African American 01/10/2019 50.4* >60 mL/min/1.73 m^2 Final    eGFR if non African American 01/10/2019 43.6* >60 mL/min/1.73 m^2 Final    Comment: Calculation used to obtain the estimated glomerular filtration  rate (eGFR) is the CKD-EPI equation.       WBC 01/10/2019 4.68  3.90 - 12.70 K/uL Final    RBC 01/10/2019 3.15* 4.60 - 6.20 M/uL Final     Hemoglobin 01/10/2019 10.5* 14.0 - 18.0 g/dL Final    Hematocrit 01/10/2019 32.2* 40.0 - 54.0 % Final    MCV 01/10/2019 102* 82 - 98 fL Final    MCH 01/10/2019 33.3* 27.0 - 31.0 pg Final    MCHC 01/10/2019 32.6  32.0 - 36.0 g/dL Final    RDW 01/10/2019 13.2  11.5 - 14.5 % Final    Platelets 01/10/2019 120* 150 - 350 K/uL Final    MPV 01/10/2019 11.0  9.2 - 12.9 fL Final    Immature Granulocytes 01/10/2019 0.2  0.0 - 0.5 % Final    Gran # (ANC) 01/10/2019 3.1  1.8 - 7.7 K/uL Final    Immature Grans (Abs) 01/10/2019 0.01  0.00 - 0.04 K/uL Final    Comment: Mild elevation in immature granulocytes is non specific and   can be seen in a variety of conditions including stress response,   acute inflammation, trauma and pregnancy. Correlation with other   laboratory and clinical findings is essential.      Lymph # 01/10/2019 0.9* 1.0 - 4.8 K/uL Final    Mono # 01/10/2019 0.4  0.3 - 1.0 K/uL Final    Eos # 01/10/2019 0.2  0.0 - 0.5 K/uL Final    Baso # 01/10/2019 0.02  0.00 - 0.20 K/uL Final    nRBC 01/10/2019 0  0 /100 WBC Final    Gran% 01/10/2019 66.4  38.0 - 73.0 % Final    Lymph% 01/10/2019 19.7  18.0 - 48.0 % Final    Mono% 01/10/2019 9.2  4.0 - 15.0 % Final    Eosinophil% 01/10/2019 4.1  0.0 - 8.0 % Final    Basophil% 01/10/2019 0.4  0.0 - 1.9 % Final    Differential Method 01/10/2019 Automated   Final    Haptoglobin 01/10/2019 111  30 - 250 mg/dL Final    LD 01/10/2019 188  110 - 260 U/L Final    Results are increased in hemolyzed samples.         Imaging:  Reviewed      Assessment:       1. B-cell lymphoma, unspecified B-cell lymphoma type, unspecified body region    2. Anemia, unspecified type           Plan:     B-Cell Lymphoma - The patient has non-hodgkin's lymphoma with involvement in the bone marrow via BM biopsy on 7/13/18 with 5-10% of marrow involved.  -CT of the abdomen and CT renal stone study done on 7/19/18 and 7/13/18 respectively failed to show intraabdominal LAD or  splenomegaly  -CT chest on 6/15/18 showed no LAD   -The patient's counts have improved slightly with WBC count of 4.68, ANC of 3.1k, Hgb of 10.5g/dL, and plt count of 120k.  -If patient requires treatment in the future will consider repeat BM biopsy and rituximab.  -Will check labs in a month with f/u appt in 7 months at [facility].    Normocytic Anemia - The patient's anemia has improved to 10.5g/dL  -His anemia may be from his underlying lymphoma or from anemia of CKD.  -Will continue to monitor as the patient does not meet criteria for tx at this time.    -Discussed with Dr Melton.    López Go MD PGY-IV  Hematology and Oncology  Pager:[phone]      Distress Screening Results: Psychosocial Distress screening score of Distress Score: 0 noted and reviewed. No intervention indicated.

## 2019-01-10 NOTE — Clinical Note
Hamzah Castillo and Mrs. Granados.Please have the patient have lab work done in [facility] in 3 months with CBC, CMP, haptoglobin and LDH.ThanksLópez Go MD PGY-VIHematology and OncologyPager:[phone]

## 2019-01-15 ENCOUNTER — TELEPHONE (OUTPATIENT)
Dept: FAMILY MEDICINE | Facility: CLINIC | Age: 69
End: 2019-01-15

## 2019-01-15 ENCOUNTER — OFFICE VISIT (OUTPATIENT)
Dept: FAMILY MEDICINE | Facility: CLINIC | Age: 69
End: 2019-01-15
Payer: MEDICARE

## 2019-01-15 VITALS
TEMPERATURE: 99 F | HEIGHT: 70 IN | HEART RATE: 91 BPM | SYSTOLIC BLOOD PRESSURE: 142 MMHG | OXYGEN SATURATION: 100 % | DIASTOLIC BLOOD PRESSURE: 70 MMHG | BODY MASS INDEX: 22.85 KG/M2 | WEIGHT: 159.63 LBS

## 2019-01-15 DIAGNOSIS — N18.30 CKD (CHRONIC KIDNEY DISEASE) STAGE 3, GFR 30-59 ML/MIN: ICD-10-CM

## 2019-01-15 DIAGNOSIS — F41.9 ANXIETY: Primary | ICD-10-CM

## 2019-01-15 PROCEDURE — 3077F SYST BP >= 140 MM HG: CPT | Mod: CPTII,S$GLB,, | Performed by: FAMILY MEDICINE

## 2019-01-15 PROCEDURE — 99213 PR OFFICE/OUTPT VISIT, EST, LEVL III, 20-29 MIN: ICD-10-PCS | Mod: S$GLB,,, | Performed by: FAMILY MEDICINE

## 2019-01-15 PROCEDURE — 1101F PT FALLS ASSESS-DOCD LE1/YR: CPT | Mod: CPTII,S$GLB,, | Performed by: FAMILY MEDICINE

## 2019-01-15 PROCEDURE — 3077F PR MOST RECENT SYSTOLIC BLOOD PRESSURE >= 140 MM HG: ICD-10-PCS | Mod: CPTII,S$GLB,, | Performed by: FAMILY MEDICINE

## 2019-01-15 PROCEDURE — 3078F PR MOST RECENT DIASTOLIC BLOOD PRESSURE < 80 MM HG: ICD-10-PCS | Mod: CPTII,S$GLB,, | Performed by: FAMILY MEDICINE

## 2019-01-15 PROCEDURE — 1101F PR PT FALLS ASSESS DOC 0-1 FALLS W/OUT INJ PAST YR: ICD-10-PCS | Mod: CPTII,S$GLB,, | Performed by: FAMILY MEDICINE

## 2019-01-15 PROCEDURE — 3078F DIAST BP <80 MM HG: CPT | Mod: CPTII,S$GLB,, | Performed by: FAMILY MEDICINE

## 2019-01-15 PROCEDURE — 99213 OFFICE O/P EST LOW 20 MIN: CPT | Mod: S$GLB,,, | Performed by: FAMILY MEDICINE

## 2019-01-15 NOTE — PATIENT INSTRUCTIONS
.What medicines are used for treating the common cold?    DRINK PLENTY FLUIDS    GET NEEDED REST    DECONGESTANTS--are medicines that cause the blood vessels in the lining of the nose to constrict leading to shrinkage of the swollen air passage allowing one to breathe better. Decongestants are mild stimulants similar to caffeine that can cause jitteriness, insomnia, increase in heart rate and slight elevation in blood pressure, and are not recommended routinely for those with high blood pressure. Pseudoephedrine was the decongestant used in most cold medicines until 2006 when laws were passed requiring pharmacies to put all products containing it behind the counter because it was being used as an ingredient in making methamphetamine. Since most drug companies didnt want their customers to have to go through the trouble of getting their cold medications from the pharmacist they changed their decongestant to on inferior one called phenylephrine.  Similar decongestants are found in nasal sprays such as AFFRIN oxymetazoline  and 4WAY.  These help reduce nasal mucus and discharge and can be helpful. HOWEVER, they should not be used more than 3 to 4 days because they can become habit forming.     ANTIHISTAMINES--are medications that are used primarily for allergy but some of the older antihistamines have a side effect of causing a runny nose to dry up. They tend to make people drowsy and therefore tend to offset the stimulant effect of the decongestants. The antihistamines most commonly used in cold medicines are diphenhydramine (Benadryl), chlorpheniramine, brompheniramine and promethazine (Phenergan). The non-sedating antihistamines like Zyrtec,  Claritin and Allegra do not help the runny nose from a cold.    COUGH MEDICINES: Almost all over-the-counter cough medicines contain dextromethorphan.  Thats the DM behind Dimetapp DM, Triaminic DM, Robitussin DM etc.  Codeine is used in many prescription cold and cough  medicines and it is a stronger cough medicine than dextromethorphan although studies have not shown this to be the case. Narcotic cough medicines can cause upset stomach, dizziness, drowsiness and respiratory depression.     EXPECTORANTS: The only expectorant that is used any more is guaifenesin. Its purpose is to thin out the mucous.    SALT WATER ( ocean spray) .Salt water sprays help reduce the amount of mucus in your nasal passages.    FEVER MEDICATIONS  acetaminophen (Tylenol) and ibuprofen (Advil, Motrin) can be used for fever, muscle ache or pain.    Antibiotics -- Antibiotics should not be used to treat an uncomplicated common cold. As noted above, colds are caused by viruses. Antibiotics treat bacterial, not viral infections.Unnecessary use of antibiotics for the treatment of the common cold can cause allergic reactions, diarrhea, or other gastrointestinal symptoms in some patients.    How can I keep from getting another cold? -- The most important thing you can do is to wash your hands often with soap and water. Alcohol hand rubs work well, too. The germs that cause the common cold can live on tables, door handles, and other surfaces for at least 2 hours. You never know when you might be touching germs. That's why it's so important to clean your hands often.

## 2019-01-15 NOTE — TELEPHONE ENCOUNTER
----- Message from Nancie Segundo sent at 1/15/2019  9:23 AM CST -----  Contact: Self [phone]  Patient is requesting to be seen today due to a sinus infection.

## 2019-01-19 PROBLEM — R05.3 CHRONIC COUGH: Status: RESOLVED | Noted: 2018-02-22 | Resolved: 2019-01-19

## 2019-01-19 PROBLEM — D64.9 ANEMIA: Status: RESOLVED | Noted: 2018-06-23 | Resolved: 2019-01-19

## 2019-01-19 PROBLEM — L89.92 PRESSURE INJURY OF SKIN, STAGE 2: Status: RESOLVED | Noted: 2018-07-09 | Resolved: 2019-01-19

## 2019-01-19 PROBLEM — Z79.01 CHRONIC ANTICOAGULATION: Status: RESOLVED | Noted: 2018-06-21 | Resolved: 2019-01-19

## 2019-01-19 PROBLEM — R33.9 URINARY RETENTION: Status: RESOLVED | Noted: 2018-06-23 | Resolved: 2019-01-19

## 2019-01-19 PROBLEM — I33.0 SUBACUTE BACTERIAL ENDOCARDITIS: Status: RESOLVED | Noted: 2018-02-27 | Resolved: 2019-01-19

## 2019-01-19 PROBLEM — R91.8 PULMONARY NODULES: Status: RESOLVED | Noted: 2018-02-22 | Resolved: 2019-01-19

## 2019-01-19 PROBLEM — R91.8 PULMONARY INFILTRATES ON CXR: Status: RESOLVED | Noted: 2018-06-16 | Resolved: 2019-01-19

## 2019-01-19 PROBLEM — I38: Status: RESOLVED | Noted: 2018-02-28 | Resolved: 2019-01-19

## 2019-01-19 PROBLEM — N17.9 AKI (ACUTE KIDNEY INJURY): Status: RESOLVED | Noted: 2018-02-22 | Resolved: 2019-01-19

## 2019-01-20 NOTE — PROGRESS NOTES
Patient ID: Rodolfo Messina is a 68 y.o. male.    Chief Complaint: sinus infection    HPI    Rodolfo Messina is a 68 y.o. male.  with several day hx of mild to moderate nasal congestion, post nasal drip and non productive cough.  Over the counter meds have not resolved symptoms.    No fever chills nausea vomiting or diarrhea. Wanted to make sure lungs are clear.  Anxiety stable           Review of Symptoms    Constitutional  No change in activity, No chills/ fever   Resp  Neg hemoptysis, stridor, choking  CVS  Neg chest pain, palpitations    Physical Exam    Constitutional:   Oriented to person, place, and time.appears well-developed and well-nourished.   No distress.     HENT:   Head: Normocephalic and atraumatic.     Right Ear: Tympanic membrane, ear canal and External ear normal      Left Ear: Tympanic membrane, ear canal and External ear normal     Nose: External Normal. Normal turbinates, No rhinorrhea (Unless checked)  [x] Edematous Turbinates   NO Purulent Discharge  NO Evidence of Bleeding   [x] Clear Discharge    Mouth/Throat: Uvula is midline, oropharynx is clear and moist and mucous membranes are normal.     Neck: supple no anterior cervical adenopathy    Eyes:   Conjunctivae are normal. Right eye exhibits no discharge. Left eye exhibits no discharge. No scleral icterus. No periorbital edema     Cardiovascular:  Regular rate and rhythm with normal S1 and S2     Pulmonary/Chest:   Clear to auscultation bilaterally without wheezes, rhonchi or rales    Musculoskeletal:   No edema. No obvious deformity No wasting   Neurological:   Alert and oriented to person, place, and time. Coordination normal.     Skin:   Skin is warm and dry. No rash noted.  No diaphoresis.     Psychiatric: Normal mood and affect. Behavior is normal. Judgment and thought content normal.       Assessment / Plan:      ICD-10-CM ICD-9-CM   1. Anxiety F41.9 300.00   2. CKD (chronic kidney disease) stage 3, GFR 30-59 ml/min N18.3 585.3      Anxiety    CKD (chronic kidney disease) stage 3, GFR 30-59 ml/min

## 2019-02-01 ENCOUNTER — PES CALL (OUTPATIENT)
Dept: ADMINISTRATIVE | Facility: CLINIC | Age: 69
End: 2019-02-01

## 2019-02-04 ENCOUNTER — PATIENT MESSAGE (OUTPATIENT)
Dept: FAMILY MEDICINE | Facility: CLINIC | Age: 69
End: 2019-02-04

## 2019-02-11 ENCOUNTER — TELEPHONE (OUTPATIENT)
Dept: CARDIOLOGY | Facility: CLINIC | Age: 69
End: 2019-02-11

## 2019-02-11 ENCOUNTER — OFFICE VISIT (OUTPATIENT)
Dept: DERMATOLOGY | Facility: CLINIC | Age: 69
End: 2019-02-11
Payer: MEDICARE

## 2019-02-11 DIAGNOSIS — L57.0 ACTINIC KERATOSIS: ICD-10-CM

## 2019-02-11 DIAGNOSIS — L72.0 EIC (EPIDERMAL INCLUSION CYST): ICD-10-CM

## 2019-02-11 DIAGNOSIS — Z12.83 SCREENING EXAM FOR SKIN CANCER: Primary | ICD-10-CM

## 2019-02-11 DIAGNOSIS — L21.9 SEBORRHEIC DERMATITIS: ICD-10-CM

## 2019-02-11 PROCEDURE — 1101F PR PT FALLS ASSESS DOC 0-1 FALLS W/OUT INJ PAST YR: ICD-10-PCS | Mod: HCNC,CPTII,S$GLB, | Performed by: DERMATOLOGY

## 2019-02-11 PROCEDURE — 17003 DESTRUCT PREMALG LES 2-14: CPT | Mod: HCNC,S$GLB,, | Performed by: DERMATOLOGY

## 2019-02-11 PROCEDURE — 1101F PT FALLS ASSESS-DOCD LE1/YR: CPT | Mod: HCNC,CPTII,S$GLB, | Performed by: DERMATOLOGY

## 2019-02-11 PROCEDURE — 99999 PR PBB SHADOW E&M-EST. PATIENT-LVL II: ICD-10-PCS | Mod: PBBFAC,HCNC,, | Performed by: DERMATOLOGY

## 2019-02-11 PROCEDURE — 99213 OFFICE O/P EST LOW 20 MIN: CPT | Mod: 25,HCNC,S$GLB, | Performed by: DERMATOLOGY

## 2019-02-11 PROCEDURE — 17000 PR DESTRUCTION(LASER SURGERY,CRYOSURGERY,CHEMOSURGERY),PREMALIGNANT LESIONS,FIRST LESION: ICD-10-PCS | Mod: HCNC,S$GLB,, | Performed by: DERMATOLOGY

## 2019-02-11 PROCEDURE — 17003 DESTRUCTION, PREMALIGNANT LESIONS; SECOND THROUGH 14 LESIONS: ICD-10-PCS | Mod: HCNC,S$GLB,, | Performed by: DERMATOLOGY

## 2019-02-11 PROCEDURE — 99213 PR OFFICE/OUTPT VISIT, EST, LEVL III, 20-29 MIN: ICD-10-PCS | Mod: 25,HCNC,S$GLB, | Performed by: DERMATOLOGY

## 2019-02-11 PROCEDURE — 99999 PR PBB SHADOW E&M-EST. PATIENT-LVL II: CPT | Mod: PBBFAC,HCNC,, | Performed by: DERMATOLOGY

## 2019-02-11 PROCEDURE — 17000 DESTRUCT PREMALG LESION: CPT | Mod: HCNC,S$GLB,, | Performed by: DERMATOLOGY

## 2019-02-11 NOTE — TELEPHONE ENCOUNTER
Pt instructed to take azithromycin 500 mg one hour before dental work.  Pt already has a precription.

## 2019-02-11 NOTE — PROGRESS NOTES
Subjective:       Patient ID:  Rodolfo Messina is a 68 y.o. male who presents for   Chief Complaint   Patient presents with    Skin Check     UBSE     67 yo male with h/o AKs presents today for an UBSE. No personal history of skin cancer.  Last visit with derm about 1 month ago per patient where he had AKs treated with LN2. Reports lesions have resolved.  Denies history of NMSC.       Review of Systems   Skin: Positive for wears hat. Negative for daily sunscreen use, activity-related sunscreen use and recent sunburn.   Hematologic/Lymphatic: Bruises/bleeds easily.        Objective:    Physical Exam   Constitutional: He appears well-developed and well-nourished. No distress.   Neurological: He is alert and oriented to person, place, and time. He is not disoriented.   Psychiatric: He has a normal mood and affect.   Skin:   Areas Examined (abnormalities noted in diagram):   Scalp / Hair Palpated and Inspected  Head / Face Inspection Performed  Neck Inspection Performed  Chest / Axilla Inspection Performed  Abdomen Inspection Performed  Back Inspection Performed  RUE Inspected  LUE Inspection Performed                   Diagram Legend     Erythematous scaling macule/papule c/w actinic keratosis       Vascular papule c/w angioma      Pigmented verrucoid papule/plaque c/w seborrheic keratosis      Yellow umbilicated papule c/w sebaceous hyperplasia      Irregularly shaped tan macule c/w lentigo     1-2 mm smooth white papules consistent with Milia      Movable subcutaneous cyst with punctum c/w epidermal inclusion cyst      Subcutaneous movable cyst c/w pilar cyst      Firm pink to brown papule c/w dermatofibroma      Pedunculated fleshy papule(s) c/w skin tag(s)      Evenly pigmented macule c/w junctional nevus     Mildly variegated pigmented, slightly irregular-bordered macule c/w mildly atypical nevus      Flesh colored to evenly pigmented papule c/w intradermal nevus       Pink pearly papule/plaque c/w basal cell  carcinoma      Erythematous hyperkeratotic cursted plaque c/w SCC      Surgical scar with no sign of skin cancer recurrence      Open and closed comedones      Inflammatory papules and pustules      Verrucoid papule consistent consistent with wart     Erythematous eczematous patches and plaques     Dystrophic onycholytic nail with subungual debris c/w onychomycosis     Umbilicated papule    Erythematous-base heme-crusted tan verrucoid plaque consistent with inflamed seborrheic keratosis     Erythematous Silvery Scaling Plaque c/w Psoriasis     See annotation      Assessment / Plan:        Screening exam for skin cancer    Upper body skin examination performed today including at least 6 points as noted in physical examination. No lesions suspicious for malignancy noted.    Actinic keratosis  Cryosurgery Procedure Note    Verbal consent from the patient is obtained including, but not limited to, risk of hypopigmentation/hyperpigmentation, scar, recurrence of lesion. The patient is aware of the precancerous quality and need for treatment of these lesions. Liquid nitrogen cryosurgery is applied to the 3 actinic keratoses, as detailed in the physical exam, to produce a freeze injury. The patient is aware that blisters may form and is instructed on wound care with gentle cleansing and use of vaseline ointment to keep moist until healed. The patient is supplied a handout on cryosurgery and is instructed to call if lesions do not completely resolve.    RV for HAK frontal scalp if does not respond in 1-2 mo    EIC (epidermal inclusion cyst)   - stable and chronic  Pt declines excision at this time    Seborrheic dermatitis  Continue ketoconazole shampoo     Patient instructed in importance in daily sun protection of at least spf 30. Sun avoidance and topical protection discussed.     Patient encouraged to wear hat for all outdoor exposure.     Also discussed sun protective clothing.               Follow-up in about 1 year  (around 2/11/2020) for for TBSE.

## 2019-02-26 ENCOUNTER — OFFICE VISIT (OUTPATIENT)
Dept: CARDIOLOGY | Facility: CLINIC | Age: 69
End: 2019-02-26
Payer: MEDICARE

## 2019-02-26 VITALS
BODY MASS INDEX: 23.02 KG/M2 | HEIGHT: 70 IN | SYSTOLIC BLOOD PRESSURE: 145 MMHG | DIASTOLIC BLOOD PRESSURE: 68 MMHG | WEIGHT: 160.81 LBS | HEART RATE: 64 BPM

## 2019-02-26 DIAGNOSIS — Z95.1 S/P CABG X 1: ICD-10-CM

## 2019-02-26 DIAGNOSIS — Z95.2 STATUS POST MITRAL VALVE REPLACEMENT: ICD-10-CM

## 2019-02-26 DIAGNOSIS — Z86.79 H/O BACTERIAL ENDOCARDITIS: ICD-10-CM

## 2019-02-26 DIAGNOSIS — N18.30 CKD (CHRONIC KIDNEY DISEASE) STAGE 3, GFR 30-59 ML/MIN: ICD-10-CM

## 2019-02-26 DIAGNOSIS — D64.9 NORMOCYTIC ANEMIA: ICD-10-CM

## 2019-02-26 DIAGNOSIS — Z95.2 S/P MVR (MITRAL VALVE REPLACEMENT): Primary | ICD-10-CM

## 2019-02-26 DIAGNOSIS — I10 ESSENTIAL HYPERTENSION: Chronic | ICD-10-CM

## 2019-02-26 PROCEDURE — 99999 PR PBB SHADOW E&M-EST. PATIENT-LVL II: ICD-10-PCS | Mod: PBBFAC,HCNC,, | Performed by: INTERNAL MEDICINE

## 2019-02-26 PROCEDURE — 1101F PR PT FALLS ASSESS DOC 0-1 FALLS W/OUT INJ PAST YR: ICD-10-PCS | Mod: HCNC,CPTII,S$GLB, | Performed by: INTERNAL MEDICINE

## 2019-02-26 PROCEDURE — 99214 PR OFFICE/OUTPT VISIT, EST, LEVL IV, 30-39 MIN: ICD-10-PCS | Mod: HCNC,S$GLB,, | Performed by: INTERNAL MEDICINE

## 2019-02-26 PROCEDURE — 3077F SYST BP >= 140 MM HG: CPT | Mod: HCNC,CPTII,S$GLB, | Performed by: INTERNAL MEDICINE

## 2019-02-26 PROCEDURE — 3078F PR MOST RECENT DIASTOLIC BLOOD PRESSURE < 80 MM HG: ICD-10-PCS | Mod: HCNC,CPTII,S$GLB, | Performed by: INTERNAL MEDICINE

## 2019-02-26 PROCEDURE — 99999 PR PBB SHADOW E&M-EST. PATIENT-LVL II: CPT | Mod: PBBFAC,HCNC,, | Performed by: INTERNAL MEDICINE

## 2019-02-26 PROCEDURE — 3077F PR MOST RECENT SYSTOLIC BLOOD PRESSURE >= 140 MM HG: ICD-10-PCS | Mod: HCNC,CPTII,S$GLB, | Performed by: INTERNAL MEDICINE

## 2019-02-26 PROCEDURE — 93000 ELECTROCARDIOGRAM COMPLETE: CPT | Mod: HCNC,S$GLB,, | Performed by: INTERNAL MEDICINE

## 2019-02-26 PROCEDURE — 3078F DIAST BP <80 MM HG: CPT | Mod: HCNC,CPTII,S$GLB, | Performed by: INTERNAL MEDICINE

## 2019-02-26 PROCEDURE — 93000 EKG 12-LEAD: ICD-10-PCS | Mod: HCNC,S$GLB,, | Performed by: INTERNAL MEDICINE

## 2019-02-26 PROCEDURE — 99214 OFFICE O/P EST MOD 30 MIN: CPT | Mod: HCNC,S$GLB,, | Performed by: INTERNAL MEDICINE

## 2019-02-26 PROCEDURE — 1101F PT FALLS ASSESS-DOCD LE1/YR: CPT | Mod: HCNC,CPTII,S$GLB, | Performed by: INTERNAL MEDICINE

## 2019-02-26 RX ORDER — AMLODIPINE BESYLATE 2.5 MG/1
2.5 TABLET ORAL DAILY
Qty: 30 TABLET | Refills: 11 | Status: SHIPPED | OUTPATIENT
Start: 2019-02-26 | End: 2019-07-30 | Stop reason: DRUGHIGH

## 2019-02-26 RX ORDER — AMLODIPINE BESYLATE 2.5 MG/1
2.5 TABLET ORAL DAILY
Qty: 30 TABLET | Refills: 11 | Status: SHIPPED | OUTPATIENT
Start: 2019-02-26 | End: 2019-02-26 | Stop reason: SDUPTHER

## 2019-02-26 RX ORDER — OMEPRAZOLE 40 MG/1
40 CAPSULE, DELAYED RELEASE ORAL
COMMUNITY
Start: 2019-02-01 | End: 2019-02-26

## 2019-02-26 NOTE — PROGRESS NOTES
Subjective:      Patient ID: Rodolfo Messina is a 68 y.o. male.    Chief Complaint: Follow-up    HPI:  Finished cardiac rehab.  Exercises at home on a bike.    Stopped walking due to bad weather  Feels good    Review of Systems   Cardiovascular: Negative for chest pain, claudication, dyspnea on exertion, irregular heartbeat, leg swelling, near-syncope, orthopnea, palpitations and syncope.      Pt sees nephrologist yearly    Pt knows to avoid NSAID's    Pt is scheduled for labs for his oncologist in April.    Pt has been drinking Ensure    Pt completed a course of IV ampicillin for bacterial endocarditis and wife thought that it made him cough.  Pt did not get a rash.  Pt did have elevation of his blood pressure.    Pt gets angioedema from shellfish.  Past Medical History:   Diagnosis Date    ANNA (acute kidney injury) 2/22/2018    Anemia     Anxiety     BPH (benign prostatic hyperplasia)     Coarse tremors     Coronary artery disease of native artery of native heart with stable angina pectoris 5/21/2018    Diverticulosis     Encounter for blood transfusion     Endocarditis     Mechanical heart valve present     Non Hodgkin's lymphoma 8/2/2018    Urinary retention         Past Surgical History:   Procedure Laterality Date    AORTOCORONARY BYPASS-CABG N/A 5/25/2018    [facility] 2ND FLR    APPENDECTOMY      COLONOSCOPY      COLONOSCOPY N/A 2/1/2018    [facility] by [facility] ENDO (4TH FLR)    ESOPHAGOGASTRODUODENOSCOPY (EGD) N/A 2/1/2018    [facility] by [facility] ENDO (4TH FLR)    EYE SURGERY Right     cataract extraction    FINGER SURGERY      FRACTURE SURGERY Right     index finger    Mitral Valve Replacement N/A 5/25/2018    Performed by [facility] OR 2ND FLR    TONSILLECTOMY      TRANSESOPHAGEAL ECHOCARDIOGRAM (SOWMYA) N/A 2/27/2018    Performed by [facility] Diagnostic Provider at [facility] CATH LAB       Family History    Problem Relation Age of Onset    Stroke Mother     Heart disease Mother         CABG    Heart disease Father         CABG    No Known Problems Sister     No Known Problems Brother     Melanoma Neg Hx        Social History     Socioeconomic History    Marital status:      Spouse name: None    Number of children: None    Years of education: None    Highest education level: None   Social Needs    Financial resource strain: None    Food insecurity - worry: None    Food insecurity - inability: None    Transportation needs - medical: None    Transportation needs - non-medical: None   Occupational History     Employer: [employer]   Tobacco Use    Smoking status: Never Smoker    Smokeless tobacco: Never Used   Substance and Sexual Activity    Alcohol use: Yes    Drug use: No    Sexual activity: Not Currently     Partners: Female   Other Topics Concern    None   Social History Narrative    None       Current Outpatient Medications on File Prior to Visit   Medication Sig Dispense Refill    ascorbic acid, vitamin C, (VITAMIN C) 1000 MG tablet Take 1,000 mg by mouth every other day.      aspirin 81 MG Chew 4 tablets (324 mg total) by Per G Tube route once daily.  0    atorvastatin (LIPITOR) 40 MG tablet 1 tablet (40 mg total) by Per G Tube route once daily. 90 tablet 3    citalopram (CELEXA) 20 MG tablet Take 1 tablet (20 mg total) by mouth once daily. 30 tablet 1    fluticasone (FLONASE) 50 mcg/actuation nasal spray 1 spray (50 mcg total) by Each Nare route once daily. 16 g 0    ketoconazole (NIZORAL) 2 % shampoo Wash hair with medicated shampoo at least 2x/week - let sit on scalp at least 5 minutes prior to rinsing 120 mL 5    lactose-reduced food (ENSURE HIGH PROTEIN-MUSCLE ORAL) Take by mouth once daily.       loratadine (CLARITIN) 10 mg tablet Take 10 mg by mouth once daily.      melatonin 10 mg Tab Take by mouth.      mirtazapine (REMERON) 30 MG tablet Take 1 tablet (30 mg total) by  "mouth every evening. 90 tablet 3    multivit-min/FA/lycopen/lutein (ADULTS 50 PLUS ORAL) Take by mouth every other day.       tamsulosin (FLOMAX) 0.4 mg Cap 1 capsule (0.4 mg total) by Per G Tube route once daily. 30 capsule 11    [DISCONTINUED] omeprazole (PRILOSEC) 40 MG capsule 40 mg.      [DISCONTINUED] pantoprazole (PROTONIX) 40 MG tablet Take 1 tablet (40 mg total) by mouth once daily. 30 tablet 5    [DISCONTINUED] co-enzyme Q-10 30 mg capsule       [DISCONTINUED] gabapentin (NEURONTIN) 100 MG capsule Take 1 capsule (100 mg total) by mouth 3 (three) times daily. 90 capsule 11     No current facility-administered medications on file prior to visit.        Review of patient's allergies indicates:   Allergen Reactions    Metoprolol Diarrhea    Shellfish containing products     Augmentin [amoxicillin-pot clavulanate]      Patient felt that it raised BP and requested to have it added as intolerance. Tolerated unasyn and ampicillin.    Ciprofloxacin     Flagyl [metronidazole]     Lisinopril Other (See Comments)     cough    Mysoline [primidone]     Omnicef [cefdinir]      Objective:     Vitals:    02/26/19 1613   BP: (!) 145/68   BP Location: Left arm   Patient Position: Sitting   BP Method: Large (Automatic)   Pulse: 64   Weight: 73 kg (160 lb 13.2 oz)   Height: 5' 10" (1.778 m)        Physical Exam   Constitutional: He is oriented to person, place, and time. He appears well-developed and well-nourished. No distress.   Eyes: No scleral icterus.   Neck: No JVD present. Carotid bruit is not present.   Cardiovascular: Regular rhythm and normal heart sounds. Exam reveals no gallop and no friction rub.   No murmur heard.  Pulmonary/Chest: Effort normal and breath sounds normal. No respiratory distress.   Musculoskeletal: He exhibits no edema.   Neurological: He is alert and oriented to person, place, and time.   Skin: Skin is warm and dry. He is not diaphoretic.   Psychiatric: He has a normal mood and " affect. His behavior is normal. Judgment and thought content normal.   Vitals reviewed.     ECG: NSR with PAC's, possible LVH, unchanged    Wt up 28 lbs    Last creat 1.4, BUN 26, alb 4.6   4 months ago  Assessment:     1. S/P MVR (mitral valve replacement)    2. S/P CABG x 1    3. Essential hypertension    4. CKD (chronic kidney disease) stage 3, GFR 30-59 ml/min    5. Status post mitral valve replacement    6. H/O bacterial endocarditis    7. Normocytic anemia      Plan:   Rodolfo was seen today for follow-up.    Diagnoses and all orders for this visit:    S/P MVR (mitral valve replacement)  -     IN OFFICE EKG 12-LEAD (to Muse)    S/P CABG x 1  -     IN OFFICE EKG 12-LEAD (to Muse)    Essential hypertension    CKD (chronic kidney disease) stage 3, GFR 30-59 ml/min    Status post mitral valve replacement  -     IN OFFICE EKG 12-LEAD (to Muse)    H/O bacterial endocarditis    Normocytic anemia    Other orders  -     Discontinue: amLODIPine (NORVASC) 2.5 MG tablet; Take 1 tablet (2.5 mg total) by mouth once daily.  -     amLODIPine (NORVASC) 2.5 MG tablet; Take 1 tablet (2.5 mg total) by mouth once daily.     Zithromax 500 mg one hour before dental work    Avoid NSAID's     Tylenol is OK    Note pt used to take lisinopril and metoprolol in the past for hpertension    Will begin amlodipine 2.5 mg daily for hypertension    Will discontinue the omeprazole since pt is no longer having GERD symptoms    Follow-up in about 4 weeks (around 3/26/2019).

## 2019-03-26 ENCOUNTER — OFFICE VISIT (OUTPATIENT)
Dept: CARDIOLOGY | Facility: CLINIC | Age: 69
End: 2019-03-26
Payer: MEDICARE

## 2019-03-26 VITALS
OXYGEN SATURATION: 98 % | BODY MASS INDEX: 23.06 KG/M2 | HEART RATE: 77 BPM | WEIGHT: 161.06 LBS | HEIGHT: 70 IN | DIASTOLIC BLOOD PRESSURE: 71 MMHG | SYSTOLIC BLOOD PRESSURE: 129 MMHG

## 2019-03-26 DIAGNOSIS — Z95.2 S/P MVR (MITRAL VALVE REPLACEMENT): Primary | ICD-10-CM

## 2019-03-26 DIAGNOSIS — I10 ESSENTIAL HYPERTENSION: Chronic | ICD-10-CM

## 2019-03-26 DIAGNOSIS — Z86.79 H/O ATRIAL FLUTTER: ICD-10-CM

## 2019-03-26 DIAGNOSIS — Z95.1 S/P CABG X 1: ICD-10-CM

## 2019-03-26 DIAGNOSIS — Z86.79 H/O BACTERIAL ENDOCARDITIS: ICD-10-CM

## 2019-03-26 DIAGNOSIS — N18.30 CKD (CHRONIC KIDNEY DISEASE) STAGE 3, GFR 30-59 ML/MIN: ICD-10-CM

## 2019-03-26 PROCEDURE — 1101F PR PT FALLS ASSESS DOC 0-1 FALLS W/OUT INJ PAST YR: ICD-10-PCS | Mod: HCNC,CPTII,S$GLB, | Performed by: INTERNAL MEDICINE

## 2019-03-26 PROCEDURE — 99999 PR PBB SHADOW E&M-EST. PATIENT-LVL III: CPT | Mod: PBBFAC,HCNC,, | Performed by: INTERNAL MEDICINE

## 2019-03-26 PROCEDURE — 3078F PR MOST RECENT DIASTOLIC BLOOD PRESSURE < 80 MM HG: ICD-10-PCS | Mod: HCNC,CPTII,S$GLB, | Performed by: INTERNAL MEDICINE

## 2019-03-26 PROCEDURE — 3074F PR MOST RECENT SYSTOLIC BLOOD PRESSURE < 130 MM HG: ICD-10-PCS | Mod: HCNC,CPTII,S$GLB, | Performed by: INTERNAL MEDICINE

## 2019-03-26 PROCEDURE — 99213 OFFICE O/P EST LOW 20 MIN: CPT | Mod: HCNC,S$GLB,, | Performed by: INTERNAL MEDICINE

## 2019-03-26 PROCEDURE — 3078F DIAST BP <80 MM HG: CPT | Mod: HCNC,CPTII,S$GLB, | Performed by: INTERNAL MEDICINE

## 2019-03-26 PROCEDURE — 99213 PR OFFICE/OUTPT VISIT, EST, LEVL III, 20-29 MIN: ICD-10-PCS | Mod: HCNC,S$GLB,, | Performed by: INTERNAL MEDICINE

## 2019-03-26 PROCEDURE — 1101F PT FALLS ASSESS-DOCD LE1/YR: CPT | Mod: HCNC,CPTII,S$GLB, | Performed by: INTERNAL MEDICINE

## 2019-03-26 PROCEDURE — 3074F SYST BP LT 130 MM HG: CPT | Mod: HCNC,CPTII,S$GLB, | Performed by: INTERNAL MEDICINE

## 2019-03-26 PROCEDURE — 99999 PR PBB SHADOW E&M-EST. PATIENT-LVL III: ICD-10-PCS | Mod: PBBFAC,HCNC,, | Performed by: INTERNAL MEDICINE

## 2019-03-26 NOTE — PROGRESS NOTES
Subjective:      Patient ID: Rodolfo Messina is a 68 y.o. male.    Chief Complaint: No chief complaint on file.    HPI:  Finished cardiac rehab  Active with work around the house.    Review of Systems   Cardiovascular: Negative for chest pain, claudication, dyspnea on exertion, irregular heartbeat, leg swelling, near-syncope, orthopnea, palpitations and syncope.        Past Medical History:   Diagnosis Date    ANNA (acute kidney injury) 2/22/2018    Anemia     Anxiety     BPH (benign prostatic hyperplasia)     Coarse tremors     Coronary artery disease of native artery of native heart with stable angina pectoris 5/21/2018    Diverticulosis     Encounter for blood transfusion     Endocarditis     Mechanical heart valve present     Non Hodgkin's lymphoma 8/2/2018    Urinary retention         Past Surgical History:   Procedure Laterality Date    AORTOCORONARY BYPASS-CABG N/A 5/25/2018    Performed by [facility] at [facility] OR 2ND FLR    APPENDECTOMY      COLONOSCOPY      COLONOSCOPY N/A 2/1/2018    Performed by [facility]an [facility] at [facility] ENDO (4TH FLR)    ESOPHAGOGASTRODUODENOSCOPY (EGD) N/A 2/1/2018    Performed by [facility] at [facility] ENDO (4TH FLR)    EYE SURGERY Right     cataract extraction    FINGER SURGERY      FRACTURE SURGERY Right     index finger    Mitral Valve Replacement N/A 5/25/2018    Performed by [facility] at [facility] OR 2ND FLR    TONSILLECTOMY      TRANSESOPHAGEAL ECHOCARDIOGRAM (SOWMYA) N/A 2/27/2018    Performed by [facility] Diagnostic Provider at [facility] CATH LAB       Family History   Problem Relation Age of Onset    Stroke Mother     Heart disease Mother         CABG    Heart disease Father         CABG    No Known Problems Sister     No Known Problems Brother     Melanoma Neg Hx        Social History     Socioeconomic History    Marital status:      Spouse name: None    Number of children: None    Years of education: None    Highest education  level: None   Occupational History     Employer: [employer]   Social Needs    Financial resource strain: None    Food insecurity:     Worry: None     Inability: None    Transportation needs:     Medical: None     Non-medical: None   Tobacco Use    Smoking status: Never Smoker    Smokeless tobacco: Never Used   Substance and Sexual Activity    Alcohol use: Yes    Drug use: No    Sexual activity: Not Currently     Partners: Female   Lifestyle    Physical activity:     Days per week: None     Minutes per session: None    Stress: None   Relationships    Social connections:     Talks on phone: None     Gets together: None     Attends Moravian service: None     Active member of club or organization: None     Attends meetings of clubs or organizations: None     Relationship status: None    Intimate partner violence:     Fear of current or ex partner: None     Emotionally abused: None     Physically abused: None     Forced sexual activity: None   Other Topics Concern    None   Social History Narrative    None       Current Outpatient Medications on File Prior to Visit   Medication Sig Dispense Refill    amLODIPine (NORVASC) 2.5 MG tablet Take 1 tablet (2.5 mg total) by mouth once daily. 30 tablet 11    ascorbic acid, vitamin C, (VITAMIN C) 1000 MG tablet Take 1,000 mg by mouth every other day.      aspirin 81 MG Chew 4 tablets (324 mg total) by Per G Tube route once daily.  0    atorvastatin (LIPITOR) 40 MG tablet 1 tablet (40 mg total) by Per G Tube route once daily. 90 tablet 3    fluticasone (FLONASE) 50 mcg/actuation nasal spray 1 spray (50 mcg total) by Each Nare route once daily. 16 g 0    ketoconazole (NIZORAL) 2 % shampoo Wash hair with medicated shampoo at least 2x/week - let sit on scalp at least 5 minutes prior to rinsing 120 mL 5    lactose-reduced food (ENSURE HIGH PROTEIN-MUSCLE ORAL) Take by mouth once daily.       loratadine (CLARITIN) 10 mg tablet Take 10 mg by mouth once daily.       "melatonin 10 mg Tab Take by mouth.      mirtazapine (REMERON) 30 MG tablet Take 1 tablet (30 mg total) by mouth every evening. 90 tablet 3    multivit-min/FA/lycopen/lutein (ADULTS 50 PLUS ORAL) Take by mouth every other day.       tamsulosin (FLOMAX) 0.4 mg Cap 1 capsule (0.4 mg total) by Per G Tube route once daily. 30 capsule 11    [DISCONTINUED] citalopram (CELEXA) 20 MG tablet Take 1 tablet (20 mg total) by mouth once daily. 30 tablet 1     No current facility-administered medications on file prior to visit.        Review of patient's allergies indicates:   Allergen Reactions    Metoprolol Diarrhea    Shellfish containing products     Augmentin [amoxicillin-pot clavulanate]      Patient felt that it raised BP and requested to have it added as intolerance. Tolerated unasyn and ampicillin.    Ciprofloxacin     Flagyl [metronidazole]     Lisinopril Other (See Comments)     cough    Mysoline [primidone]     Omnicef [cefdinir]      Objective:     Vitals:    03/26/19 1546   BP: 129/71   BP Location: Left arm   Patient Position: Sitting   BP Method: Large (Automatic)   Pulse: 77   SpO2: 98%   Weight: 73.1 kg (161 lb 0.7 oz)   Height: 5' 10" (1.778 m)        Physical Exam   Constitutional: He is oriented to person, place, and time. He appears well-developed and well-nourished. No distress.   Eyes: No scleral icterus.   Neck: No JVD present. Carotid bruit is not present.   Cardiovascular: Regular rhythm and normal heart sounds. Exam reveals no gallop and no friction rub.   No murmur heard.  Pulmonary/Chest: Effort normal and breath sounds normal. No respiratory distress.   Musculoskeletal: He exhibits no edema.   Neurological: He is alert and oriented to person, place, and time.   Skin: Skin is warm and dry. He is not diaphoretic.   Psychiatric: He has a normal mood and affect. His behavior is normal. Judgment and thought content normal.   Vitals reviewed.     Wt up 16 lbs since 8/18  Assessment:     1. S/P " MVR (mitral valve replacement)    2. S/P CABG x 1    3. H/O bacterial endocarditis    4. H/O atrial flutter    5. Essential hypertension    6. CKD (chronic kidney disease) stage 3, GFR 30-59 ml/min      Plan:   Diagnoses and all orders for this visit:    S/P MVR (mitral valve replacement)    S/P CABG x 1    H/O bacterial endocarditis    H/O atrial flutter    Essential hypertension  -     CBC auto differential; Future  -     Comprehensive metabolic panel; Future  -     Lipid panel; Future  -     TSH; Future    CKD (chronic kidney disease) stage 3, GFR 30-59 ml/min         No limitation of activity    HBP controlled with addition of amlodipine    F/u with Dr Claudio    F/u with nephrologist for CKD    F/u with neurologist for tremors (for which mirtazipine has been prescribed)    F/u with oncologist due to hx of non-Hodgkin's lymphoma    Avoid Advil and Aleve    Follow up in about 4 months (around 7/26/2019).

## 2019-03-27 RX ORDER — PANTOPRAZOLE SODIUM 40 MG/1
TABLET, DELAYED RELEASE ORAL
Qty: 90 TABLET | Refills: 5 | Status: SHIPPED | OUTPATIENT
Start: 2019-03-27 | End: 2020-06-26

## 2019-03-28 ENCOUNTER — OFFICE VISIT (OUTPATIENT)
Dept: FAMILY MEDICINE | Facility: CLINIC | Age: 69
End: 2019-03-28
Payer: MEDICARE

## 2019-03-28 VITALS
WEIGHT: 157.19 LBS | SYSTOLIC BLOOD PRESSURE: 124 MMHG | HEART RATE: 81 BPM | OXYGEN SATURATION: 92 % | TEMPERATURE: 98 F | DIASTOLIC BLOOD PRESSURE: 82 MMHG | HEIGHT: 70 IN | BODY MASS INDEX: 22.5 KG/M2

## 2019-03-28 DIAGNOSIS — H65.91 FLUID LEVEL BEHIND TYMPANIC MEMBRANE OF RIGHT EAR: ICD-10-CM

## 2019-03-28 DIAGNOSIS — R10.9 ABDOMINAL PAIN, UNSPECIFIED ABDOMINAL LOCATION: Primary | ICD-10-CM

## 2019-03-28 PROCEDURE — 1101F PR PT FALLS ASSESS DOC 0-1 FALLS W/OUT INJ PAST YR: ICD-10-PCS | Mod: CPTII,S$GLB,, | Performed by: FAMILY MEDICINE

## 2019-03-28 PROCEDURE — 3079F DIAST BP 80-89 MM HG: CPT | Mod: CPTII,S$GLB,, | Performed by: FAMILY MEDICINE

## 2019-03-28 PROCEDURE — 99214 PR OFFICE/OUTPT VISIT, EST, LEVL IV, 30-39 MIN: ICD-10-PCS | Mod: S$GLB,,, | Performed by: FAMILY MEDICINE

## 2019-03-28 PROCEDURE — 3079F PR MOST RECENT DIASTOLIC BLOOD PRESSURE 80-89 MM HG: ICD-10-PCS | Mod: CPTII,S$GLB,, | Performed by: FAMILY MEDICINE

## 2019-03-28 PROCEDURE — 3074F SYST BP LT 130 MM HG: CPT | Mod: CPTII,S$GLB,, | Performed by: FAMILY MEDICINE

## 2019-03-28 PROCEDURE — 99214 OFFICE O/P EST MOD 30 MIN: CPT | Mod: S$GLB,,, | Performed by: FAMILY MEDICINE

## 2019-03-28 PROCEDURE — 1101F PT FALLS ASSESS-DOCD LE1/YR: CPT | Mod: CPTII,S$GLB,, | Performed by: FAMILY MEDICINE

## 2019-03-28 PROCEDURE — 3074F PR MOST RECENT SYSTOLIC BLOOD PRESSURE < 130 MM HG: ICD-10-PCS | Mod: CPTII,S$GLB,, | Performed by: FAMILY MEDICINE

## 2019-03-28 RX ORDER — FLUTICASONE PROPIONATE 50 MCG
1 SPRAY, SUSPENSION (ML) NASAL DAILY
Qty: 15.8 ML | Refills: 3 | Status: SHIPPED | OUTPATIENT
Start: 2019-03-28 | End: 2019-08-07 | Stop reason: SDUPTHER

## 2019-03-28 RX ORDER — TAMSULOSIN HYDROCHLORIDE 0.4 MG/1
0.8 CAPSULE ORAL DAILY
Qty: 180 CAPSULE | Refills: 0 | Status: SHIPPED | OUTPATIENT
Start: 2019-03-28

## 2019-03-28 RX ORDER — FLUTICASONE PROPIONATE 50 MCG
1 SPRAY, SUSPENSION (ML) NASAL DAILY
Qty: 16 G | Refills: 0 | Status: CANCELLED | OUTPATIENT
Start: 2019-03-28

## 2019-03-28 NOTE — PROGRESS NOTES
Chief Complaint  Chief Complaint   Patient presents with    Abdominal Pain     left side        HPI  Rodolfo Messina is a 68 y.o. male with multiple medical diagnoses as listed in the medical history and problem list that presents for left sided abdominal pain.  Patient reports intermittent left sided.  No constipation, diarrhea,  or vomiting.  Some nausea that he associates with sinus drainage.  He also notes a lot of gas recently.  Has been present for about 2 week and may be associated with muscle pain from working in the garden.  Not worse with food.  No reflux symptoms.  Having some sinus drainage and right ear feel plugged.      PAST MEDICAL HISTORY:  Past Medical History:   Diagnosis Date    ANNA (acute kidney injury) 2/22/2018    Anemia     Anxiety     BPH (benign prostatic hyperplasia)     Coarse tremors     Coronary artery disease of native artery of native heart with stable angina pectoris 5/21/2018    Diverticulosis     Encounter for blood transfusion     Endocarditis     Mechanical heart valve present     Non Hodgkin's lymphoma 8/2/2018    Urinary retention        PAST SURGICAL HISTORY:  Past Surgical History:   Procedure Laterality Date    AORTOCORONARY BYPASS-CABG N/A 5/25/2018    Performed by [facility] OR 2ND FLR    APPENDECTOMY      COLONOSCOPY      COLONOSCOPY N/A 2/1/2018    Performed by [facility]ar [facility]an MD at [facility] ENDO (4TH FLR)    ESOPHAGOGASTRODUODENOSCOPY (EGD) N/A 2/1/2018    Performed by [facility] at [facility] ENDO (4TH FLR)    EYE SURGERY Right     cataract extraction    FINGER SURGERY      FRACTURE SURGERY Right     index finger    Mitral Valve Replacement N/A 5/25/2018    Performed by [facility] at [facility] OR 2ND FLR    TONSILLECTOMY      TRANSESOPHAGEAL ECHOCARDIOGRAM (SOWMYA) N/A 2/27/2018    Performed by [facility] Diagnostic Provider at [facility] CATH LAB       SOCIAL HISTORY:  Social History     Socioeconomic History    Marital status:       Spouse name: Not on file    Number of children: Not on file    Years of education: Not on file    Highest education level: Not on file   Occupational History     Employer: [employer]   Social Needs    Financial resource strain: Not on file    Food insecurity:     Worry: Not on file     Inability: Not on file    Transportation needs:     Medical: Not on file     Non-medical: Not on file   Tobacco Use    Smoking status: Never Smoker    Smokeless tobacco: Never Used   Substance and Sexual Activity    Alcohol use: Yes    Drug use: No    Sexual activity: Not Currently     Partners: Female   Lifestyle    Physical activity:     Days per week: Not on file     Minutes per session: Not on file    Stress: Not on file   Relationships    Social connections:     Talks on phone: Not on file     Gets together: Not on file     Attends Synagogue service: Not on file     Active member of club or organization: Not on file     Attends meetings of clubs or organizations: Not on file     Relationship status: Not on file    Intimate partner violence:     Fear of current or ex partner: Not on file     Emotionally abused: Not on file     Physically abused: Not on file     Forced sexual activity: Not on file   Other Topics Concern    Not on file   Social History Narrative    Not on file       FAMILY HISTORY:  Family History   Problem Relation Age of Onset    Stroke Mother     Heart disease Mother         CABG    Heart disease Father         CABG    No Known Problems Sister     No Known Problems Brother     Melanoma Neg Hx        ALLERGIES AND MEDICATIONS: updated and reviewed.  Review of patient's allergies indicates:   Allergen Reactions    Metoprolol Diarrhea    Shellfish containing products     Augmentin [amoxicillin-pot clavulanate]      Patient felt that it raised BP and requested to have it added as intolerance. Tolerated unasyn and ampicillin.    Ciprofloxacin     Flagyl [metronidazole]     Lisinopril  Other (See Comments)     cough    Mysoline [primidone]     Omnicef [cefdinir]      Current Outpatient Medications   Medication Sig Dispense Refill    amLODIPine (NORVASC) 2.5 MG tablet Take 1 tablet (2.5 mg total) by mouth once daily. 30 tablet 11    ascorbic acid, vitamin C, (VITAMIN C) 1000 MG tablet Take 1,000 mg by mouth every other day.      aspirin 81 MG Chew 4 tablets (324 mg total) by Per G Tube route once daily.  0    atorvastatin (LIPITOR) 40 MG tablet 1 tablet (40 mg total) by Per G Tube route once daily. 90 tablet 3    fluticasone (FLONASE) 50 mcg/actuation nasal spray 1 spray (50 mcg total) by Each Nare route once daily. 16 g 0    ketoconazole (NIZORAL) 2 % shampoo Wash hair with medicated shampoo at least 2x/week - let sit on scalp at least 5 minutes prior to rinsing 120 mL 5    lactose-reduced food (ENSURE HIGH PROTEIN-MUSCLE ORAL) Take by mouth once daily.       loratadine (CLARITIN) 10 mg tablet Take 10 mg by mouth once daily.      melatonin 10 mg Tab Take by mouth.      mirtazapine (REMERON) 30 MG tablet Take 1 tablet (30 mg total) by mouth every evening. 90 tablet 3    multivit-min/FA/lycopen/lutein (ADULTS 50 PLUS ORAL) Take by mouth every other day.       pantoprazole (PROTONIX) 40 MG tablet TAKE 1 TABLET EVERY DAY 90 tablet 5    fluticasone (FLONASE) 50 mcg/actuation nasal spray 1 spray (50 mcg total) by Each Nare route once daily. 15.8 mL 3    tamsulosin (FLOMAX) 0.4 mg Cap Take 2 capsules (0.8 mg total) by mouth once daily. 180 capsule 0     No current facility-administered medications for this visit.          ROS  Review of Systems   Constitutional: Negative for activity change, appetite change, chills and fatigue.   HENT: Negative for congestion, ear discharge, hearing loss, mouth sores, rhinorrhea, sinus pain and trouble swallowing.    Eyes: Negative for photophobia, pain, discharge and visual disturbance.   Respiratory: Negative for cough, chest tightness, shortness of  "breath and wheezing.    Cardiovascular: Negative for chest pain, palpitations and leg swelling.   Gastrointestinal: Positive for nausea. Negative for abdominal distention, abdominal pain, blood in stool, constipation, diarrhea and vomiting.   Genitourinary: Negative for difficulty urinating, dysuria and flank pain.   Musculoskeletal: Negative for arthralgias, back pain, gait problem, joint swelling and myalgias.   Skin: Negative for color change and rash.   Neurological: Negative for dizziness, tremors, speech difficulty, light-headedness, numbness and headaches.   Psychiatric/Behavioral: Negative for behavioral problems, confusion, hallucinations, sleep disturbance and suicidal ideas.           PHYSICAL EXAM  Vitals:    03/28/19 1109   BP: 124/82   BP Location: Left arm   Patient Position: Sitting   Pulse: 81   Temp: 98.4 °F (36.9 °C)   TempSrc: Oral   SpO2: (!) 92%   Weight: 71.3 kg (157 lb 3 oz)   Height: 5' 10" (1.778 m)    Body mass index is 22.55 kg/m².  Weight: 71.3 kg (157 lb 3 oz)   Height: 5' 10" (177.8 cm)     Physical Exam   Constitutional: He is oriented to person, place, and time. He appears well-developed. No distress.   HENT:   Head: Normocephalic.   Right Ear: External ear normal.   Left Ear: External ear normal.   Mouth/Throat: No oropharyngeal exudate.   Eyes: Conjunctivae are normal. Right eye exhibits no discharge. Left eye exhibits no discharge.   Neck: Normal range of motion. Neck supple. No thyromegaly present.   Cardiovascular: Normal rate and regular rhythm. Exam reveals no friction rub.   No murmur heard.  Pulmonary/Chest: Effort normal and breath sounds normal. No stridor. No respiratory distress. He has no wheezes.   Abdominal: Soft. Bowel sounds are normal. He exhibits no distension. There is no hepatosplenomegaly. There is no tenderness. There is no guarding. No hernia.   Multiple scars from surgeries.    Musculoskeletal: Normal range of motion. He exhibits no edema or deformity. "   Neurological: He is alert and oriented to person, place, and time. No cranial nerve deficit. Coordination normal.   Skin: Skin is warm. No rash noted. He is not diaphoretic. No erythema. No pallor.   Psychiatric: He has a normal mood and affect. Thought content normal.   Nursing note and vitals reviewed.        Health Maintenance       Date Due Completion Date    Pneumococcal Vaccine (65+ High/Highest Risk) (1 of 2 - PCV13) 10/31/2019 (Originally 6/15/2015) ---    Influenza Vaccine 03/18/2020 (Originally 8/1/2018) 10/13/2017 (Declined)    Override on 10/13/2017: Declined    Override on 8/29/2016: Declined    Fecal Occult Blood Test (FOBT)/[employer] 07/08/2019 7/8/2018    Override on 6/15/2017: Done (negative)    High Dose Statin 03/26/2020 3/26/2019    Lipid Panel 06/13/2023 6/13/2018    TETANUS VACCINE 08/24/2026 8/24/2016            Assessment & Plan      Rodolfo was seen today for abdominal pain.    Diagnoses and all orders for this visit:    Abdominal pain, unspecified abdominal location  -     US Abdomen Complete; Future    Fluid level behind tympanic membrane of right ear  -     fluticasone (FLONASE) 50 mcg/actuation nasal spray; 1 spray (50 mcg total) by Each Nare route once daily.    Other orders  -     Cancel: fluticasone (FLONASE) 50 mcg/actuation nasal spray; 1 spray (50 mcg total) by Each Nare route once daily.          Follow-up: No follow-ups on file.

## 2019-04-01 ENCOUNTER — HOSPITAL ENCOUNTER (OUTPATIENT)
Dept: RADIOLOGY | Facility: HOSPITAL | Age: 69
Discharge: HOME | End: 2019-04-01
Attending: FAMILY MEDICINE
Payer: MEDICARE

## 2019-04-01 DIAGNOSIS — R10.9 ABDOMINAL PAIN, UNSPECIFIED ABDOMINAL LOCATION: ICD-10-CM

## 2019-04-01 PROCEDURE — 76700 US EXAM ABDOM COMPLETE: CPT | Mod: TC,HCNC,PO

## 2019-04-10 ENCOUNTER — LAB VISIT (OUTPATIENT)
Dept: LAB | Facility: HOSPITAL | Age: 69
End: 2019-04-10
Attending: INTERNAL MEDICINE
Payer: MEDICARE

## 2019-04-10 DIAGNOSIS — C85.10 B-CELL LYMPHOMA, UNSPECIFIED B-CELL LYMPHOMA TYPE, UNSPECIFIED BODY REGION: ICD-10-CM

## 2019-04-10 DIAGNOSIS — D64.9 ANEMIA, UNSPECIFIED TYPE: ICD-10-CM

## 2019-04-10 LAB
ALBUMIN SERPL BCP-MCNC: 3.9 G/DL (ref 3.5–5.2)
ALP SERPL-CCNC: 56 U/L (ref 38–126)
ALT SERPL W/O P-5'-P-CCNC: 51 U/L (ref 10–44)
ANION GAP SERPL CALC-SCNC: 5 MMOL/L (ref 8–16)
AST SERPL-CCNC: 32 U/L (ref 15–46)
BASOPHILS # BLD AUTO: 0.01 K/UL (ref 0–0.2)
BASOPHILS NFR BLD: 0.3 % (ref 0–1.9)
BILIRUB SERPL-MCNC: 0.9 MG/DL (ref 0.1–1)
BUN SERPL-MCNC: 30 MG/DL (ref 2–20)
CALCIUM SERPL-MCNC: 9 MG/DL (ref 8.7–10.5)
CHLORIDE SERPL-SCNC: 108 MMOL/L (ref 95–110)
CO2 SERPL-SCNC: 27 MMOL/L (ref 23–29)
CREAT SERPL-MCNC: 1.43 MG/DL (ref 0.5–1.4)
DIFFERENTIAL METHOD: ABNORMAL
EOSINOPHIL # BLD AUTO: 0.1 K/UL (ref 0–0.5)
EOSINOPHIL NFR BLD: 3.3 % (ref 0–8)
ERYTHROCYTE [DISTWIDTH] IN BLOOD BY AUTOMATED COUNT: 12.9 % (ref 11.5–14.5)
EST. GFR  (AFRICAN AMERICAN): 57.8 ML/MIN/1.73 M^2
EST. GFR  (NON AFRICAN AMERICAN): 50 ML/MIN/1.73 M^2
GLUCOSE SERPL-MCNC: 130 MG/DL (ref 70–110)
HAPTOGLOB SERPL-MCNC: 117 MG/DL (ref 30–250)
HCT VFR BLD AUTO: 32.3 % (ref 40–54)
HGB BLD-MCNC: 10.4 G/DL (ref 14–18)
LDH SERPL L TO P-CCNC: 166 U/L (ref 110–260)
LYMPHOCYTES # BLD AUTO: 0.8 K/UL (ref 1–4.8)
LYMPHOCYTES NFR BLD: 23.3 % (ref 18–48)
MCH RBC QN AUTO: 31.9 PG (ref 27–31)
MCHC RBC AUTO-ENTMCNC: 32.2 G/DL (ref 32–36)
MCV RBC AUTO: 99 FL (ref 82–98)
MONOCYTES # BLD AUTO: 0.3 K/UL (ref 0.3–1)
MONOCYTES NFR BLD: 7.5 % (ref 4–15)
NEUTROPHILS # BLD AUTO: 2.4 K/UL (ref 1.8–7.7)
NEUTROPHILS NFR BLD: 65.3 % (ref 38–73)
PLATELET # BLD AUTO: 114 K/UL (ref 150–350)
PMV BLD AUTO: 11.6 FL (ref 9.2–12.9)
POTASSIUM SERPL-SCNC: 5.5 MMOL/L (ref 3.5–5.1)
PROT SERPL-MCNC: 6.5 G/DL (ref 6–8.4)
RBC # BLD AUTO: 3.26 M/UL (ref 4.6–6.2)
SODIUM SERPL-SCNC: 140 MMOL/L (ref 136–145)
WBC # BLD AUTO: 3.6 K/UL (ref 3.9–12.7)

## 2019-04-10 PROCEDURE — 83615 LACTATE (LD) (LDH) ENZYME: CPT | Mod: HCNC

## 2019-04-10 PROCEDURE — 83010 ASSAY OF HAPTOGLOBIN QUANT: CPT | Mod: HCNC,PO

## 2019-04-10 PROCEDURE — 85025 COMPLETE CBC W/AUTO DIFF WBC: CPT | Mod: HCNC,PO

## 2019-04-10 PROCEDURE — 36415 COLL VENOUS BLD VENIPUNCTURE: CPT | Mod: HCNC,PO

## 2019-04-10 PROCEDURE — 80053 COMPREHEN METABOLIC PANEL: CPT | Mod: HCNC,PO

## 2019-04-11 ENCOUNTER — TELEPHONE (OUTPATIENT)
Dept: HEMATOLOGY/ONCOLOGY | Facility: CLINIC | Age: 69
End: 2019-04-11

## 2019-04-11 DIAGNOSIS — C83.00 SMALL B-CELL LYMPHOMA, UNSPECIFIED BODY REGION: Primary | ICD-10-CM

## 2019-04-11 NOTE — TELEPHONE ENCOUNTER
I called Mr Messina and let him know that his labs looked stable.  [place] [facility] [place]  The patient expressed understanding.  All questions were answered to his satisfaction.    López Go MD  Hematology and Oncology Fellow PGY-VI  Pager:[phone]

## 2019-05-21 DIAGNOSIS — C83.00 SMALL B-CELL LYMPHOMA, UNSPECIFIED BODY REGION: Primary | ICD-10-CM

## 2019-05-23 RX ORDER — MIRTAZAPINE 30 MG/1
TABLET, FILM COATED ORAL
Qty: 90 TABLET | Refills: 3 | Status: SHIPPED | OUTPATIENT
Start: 2019-05-23 | End: 2020-03-09

## 2019-05-30 ENCOUNTER — PES CALL (OUTPATIENT)
Dept: ADMINISTRATIVE | Facility: CLINIC | Age: 69
End: 2019-05-30

## 2019-05-30 RX ORDER — TAMSULOSIN HYDROCHLORIDE 0.4 MG/1
CAPSULE ORAL
Qty: 180 CAPSULE | Refills: 0 | OUTPATIENT
Start: 2019-05-30

## 2019-06-11 ENCOUNTER — EXTERNAL HOME HEALTH (OUTPATIENT)
Dept: HOME HEALTH SERVICES | Facility: HOSPITAL | Age: 69
End: 2019-06-11
Payer: MEDICARE

## 2019-06-17 ENCOUNTER — TELEPHONE (OUTPATIENT)
Dept: HEMATOLOGY/ONCOLOGY | Facility: CLINIC | Age: 69
End: 2019-06-17

## 2019-06-17 NOTE — TELEPHONE ENCOUNTER
----- Message from López Go MD sent at 1/10/2019 11:41 AM CST -----  Regarding: f/u appt  Pt needs f/u appt in August with CBC and CMP, LDH hapto

## 2019-06-17 NOTE — TELEPHONE ENCOUNTER
I called the patient and left him a message to call me back at [phone] ext [phone].     López Go MD PGY-VI  Hematology and Oncology  Pager:[phone]

## 2019-06-21 PROCEDURE — G0180 PR HOME HEALTH MD CERTIFICATION: ICD-10-PCS | Mod: ICN,,, | Performed by: THORACIC SURGERY (CARDIOTHORACIC VASCULAR SURGERY)

## 2019-06-21 PROCEDURE — G0180 MD CERTIFICATION HHA PATIENT: HCPCS | Mod: ICN,,, | Performed by: THORACIC SURGERY (CARDIOTHORACIC VASCULAR SURGERY)

## 2019-07-26 ENCOUNTER — PATIENT OUTREACH (OUTPATIENT)
Dept: ADMINISTRATIVE | Facility: OTHER | Age: 69
End: 2019-07-26

## 2019-07-30 ENCOUNTER — OFFICE VISIT (OUTPATIENT)
Dept: CARDIOLOGY | Facility: CLINIC | Age: 69
End: 2019-07-30
Payer: MEDICARE

## 2019-07-30 VITALS
BODY MASS INDEX: 23.84 KG/M2 | OXYGEN SATURATION: 100 % | DIASTOLIC BLOOD PRESSURE: 79 MMHG | SYSTOLIC BLOOD PRESSURE: 154 MMHG | HEIGHT: 70 IN | HEART RATE: 71 BPM | WEIGHT: 166.56 LBS

## 2019-07-30 DIAGNOSIS — N18.30 CKD (CHRONIC KIDNEY DISEASE) STAGE 3, GFR 30-59 ML/MIN: ICD-10-CM

## 2019-07-30 DIAGNOSIS — Z95.2 S/P MVR (MITRAL VALVE REPLACEMENT): ICD-10-CM

## 2019-07-30 DIAGNOSIS — E87.5 HYPERKALEMIA: ICD-10-CM

## 2019-07-30 DIAGNOSIS — I10 ESSENTIAL HYPERTENSION: ICD-10-CM

## 2019-07-30 DIAGNOSIS — Z95.1 S/P CABG X 1: Primary | ICD-10-CM

## 2019-07-30 DIAGNOSIS — Z86.79 H/O ATRIAL FLUTTER: ICD-10-CM

## 2019-07-30 DIAGNOSIS — Z95.1 HX OF CABG: ICD-10-CM

## 2019-07-30 DIAGNOSIS — I15.2 HYPERTENSION DUE TO ENDOCRINE DISORDER: Chronic | ICD-10-CM

## 2019-07-30 DIAGNOSIS — Z86.79 H/O BACTERIAL ENDOCARDITIS: ICD-10-CM

## 2019-07-30 PROCEDURE — 99214 OFFICE O/P EST MOD 30 MIN: CPT | Mod: HCNC,S$GLB,, | Performed by: INTERNAL MEDICINE

## 2019-07-30 PROCEDURE — 3078F PR MOST RECENT DIASTOLIC BLOOD PRESSURE < 80 MM HG: ICD-10-PCS | Mod: HCNC,CPTII,S$GLB, | Performed by: INTERNAL MEDICINE

## 2019-07-30 PROCEDURE — 99999 PR PBB SHADOW E&M-EST. PATIENT-LVL III: ICD-10-PCS | Mod: PBBFAC,HCNC,, | Performed by: INTERNAL MEDICINE

## 2019-07-30 PROCEDURE — 3077F SYST BP >= 140 MM HG: CPT | Mod: HCNC,CPTII,S$GLB, | Performed by: INTERNAL MEDICINE

## 2019-07-30 PROCEDURE — 99999 PR PBB SHADOW E&M-EST. PATIENT-LVL III: CPT | Mod: PBBFAC,HCNC,, | Performed by: INTERNAL MEDICINE

## 2019-07-30 PROCEDURE — 3077F PR MOST RECENT SYSTOLIC BLOOD PRESSURE >= 140 MM HG: ICD-10-PCS | Mod: HCNC,CPTII,S$GLB, | Performed by: INTERNAL MEDICINE

## 2019-07-30 PROCEDURE — 1101F PT FALLS ASSESS-DOCD LE1/YR: CPT | Mod: HCNC,CPTII,S$GLB, | Performed by: INTERNAL MEDICINE

## 2019-07-30 PROCEDURE — 1101F PR PT FALLS ASSESS DOC 0-1 FALLS W/OUT INJ PAST YR: ICD-10-PCS | Mod: HCNC,CPTII,S$GLB, | Performed by: INTERNAL MEDICINE

## 2019-07-30 PROCEDURE — 3078F DIAST BP <80 MM HG: CPT | Mod: HCNC,CPTII,S$GLB, | Performed by: INTERNAL MEDICINE

## 2019-07-30 PROCEDURE — 99214 PR OFFICE/OUTPT VISIT, EST, LEVL IV, 30-39 MIN: ICD-10-PCS | Mod: HCNC,S$GLB,, | Performed by: INTERNAL MEDICINE

## 2019-07-30 PROCEDURE — 93000 EKG 12-LEAD: ICD-10-PCS | Mod: HCNC,S$GLB,, | Performed by: INTERNAL MEDICINE

## 2019-07-30 PROCEDURE — 93000 ELECTROCARDIOGRAM COMPLETE: CPT | Mod: HCNC,S$GLB,, | Performed by: INTERNAL MEDICINE

## 2019-07-30 RX ORDER — GABAPENTIN 100 MG/1
100 CAPSULE ORAL DAILY
COMMUNITY
End: 2020-10-13

## 2019-07-30 RX ORDER — AMLODIPINE BESYLATE 5 MG/1
5 TABLET ORAL DAILY
Qty: 30 TABLET | Refills: 11 | Status: SHIPPED | OUTPATIENT
Start: 2019-07-30 | End: 2019-07-30

## 2019-07-30 RX ORDER — AMLODIPINE BESYLATE 5 MG/1
5 TABLET ORAL DAILY
Qty: 90 TABLET | Refills: 3 | Status: SHIPPED | OUTPATIENT
Start: 2019-07-30 | End: 2020-06-29

## 2019-07-30 NOTE — PROGRESS NOTES
Subjective:      Patient ID: Rodolfo Messina is a 69 y.o. male.    Chief Complaint: Follow-up    HPI:  Active.  Feels well.     Review of Systems   Cardiovascular: Negative for chest pain, claudication, dyspnea on exertion, irregular heartbeat, leg swelling, near-syncope, orthopnea, palpitations and syncope.      Note the lisinopril was discontinued due to cough; there is no hx of angioedema    Past Medical History:   Diagnosis Date    ANNA (acute kidney injury) 2/22/2018    Anemia     Anxiety     BPH (benign prostatic hyperplasia)     Coarse tremors     Coronary artery disease of native artery of native heart with stable angina pectoris 5/21/2018    Diverticulosis     Encounter for blood transfusion     Endocarditis     Mechanical heart valve present     Non Hodgkin's lymphoma 8/2/2018    Urinary retention         Past Surgical History:   Procedure Laterality Date    AORTOCORONARY BYPASS-CABG N/A 5/25/2018    Performed by Marvin Go MD at [facility] OR 2ND FLR    APPENDECTOMY      COLONOSCOPY      COLONOSCOPY N/A 2/1/2018    Performed by Richar Payan MD at [facility] ENDO (4TH FLR)    ESOPHAGOGASTRODUODENOSCOPY (EGD) N/A 2/1/2018    Performed by Richar Payan MD at [facility] ENDO (4TH FLR)    EYE SURGERY Right     cataract extraction    FINGER SURGERY      FRACTURE SURGERY Right     index finger    Mitral Valve Replacement N/A 5/25/2018    Performed by Marvin [facility] at [facility] OR 2ND FLR    TONSILLECTOMY      TRANSESOPHAGEAL ECHOCARDIOGRAM (SOWMYA) N/A 2/27/2018    Performed by [facility] Diagnostic Provider at [facility] CATH LAB       Family History   Problem Relation Age of Onset    Stroke Mother     Heart disease Mother         CABG    Heart disease Father         CABG    No Known Problems Sister     No Known Problems Brother     Melanoma Neg Hx        Social History     Socioeconomic History    Marital status:      Spouse name: Not on file    Number of children: Not on file    Years of  education: Not on file    Highest education level: Not on file   Occupational History     Employer: [employer]   Social Needs    Financial resource strain: Not on file    Food insecurity:     Worry: Not on file     Inability: Not on file    Transportation needs:     Medical: Not on file     Non-medical: Not on file   Tobacco Use    Smoking status: Never Smoker    Smokeless tobacco: Never Used   Substance and Sexual Activity    Alcohol use: Yes    Drug use: No    Sexual activity: Not Currently     Partners: Female   Lifestyle    Physical activity:     Days per week: Not on file     Minutes per session: Not on file    Stress: Not on file   Relationships    Social connections:     Talks on phone: Not on file     Gets together: Not on file     Attends Sikhism service: Not on file     Active member of club or organization: Not on file     Attends meetings of clubs or organizations: Not on file     Relationship status: Not on file   Other Topics Concern    Not on file   Social History Narrative    Not on file       Current Outpatient Medications on File Prior to Visit   Medication Sig Dispense Refill    ascorbic acid, vitamin C, (VITAMIN C) 1000 MG tablet Take 1,000 mg by mouth every other day.      aspirin 81 MG Chew 4 tablets (324 mg total) by Per G Tube route once daily.  0    atorvastatin (LIPITOR) 40 MG tablet 1 tablet (40 mg total) by Per G Tube route once daily. 90 tablet 3    fluticasone (FLONASE) 50 mcg/actuation nasal spray 1 spray (50 mcg total) by Each Nare route once daily. 15.8 mL 3    gabapentin (NEURONTIN) 100 MG capsule Take 100 mg by mouth every evening.      ketoconazole (NIZORAL) 2 % shampoo Wash hair with medicated shampoo at least 2x/week - let sit on scalp at least 5 minutes prior to rinsing 120 mL 5    lactose-reduced food (ENSURE HIGH PROTEIN-MUSCLE ORAL) Take by mouth once daily.       loratadine (CLARITIN) 10 mg tablet Take 10 mg by mouth once daily.      melatonin 10 mg  "Tab Take by mouth.      mirtazapine (REMERON) 30 MG tablet TAKE 1 TABLET EVERY EVENING 90 tablet 3    multivit-min/FA/lycopen/lutein (ADULTS 50 PLUS ORAL) Take by mouth every other day.       pantoprazole (PROTONIX) 40 MG tablet TAKE 1 TABLET EVERY DAY 90 tablet 5    tamsulosin (FLOMAX) 0.4 mg Cap Take 2 capsules (0.8 mg total) by mouth once daily. 180 capsule 0    [DISCONTINUED] amLODIPine (NORVASC) 2.5 MG tablet Take 1 tablet (2.5 mg total) by mouth once daily. 30 tablet 11    [DISCONTINUED] fluticasone (FLONASE) 50 mcg/actuation nasal spray 1 spray (50 mcg total) by Each Nare route once daily. 16 g 0     No current facility-administered medications on file prior to visit.        Review of patient's allergies indicates:   Allergen Reactions    Metoprolol Diarrhea    Shellfish containing products     Augmentin [amoxicillin-pot clavulanate]      Patient felt that it raised BP and requested to have it added as intolerance. Tolerated unasyn and ampicillin.    Ciprofloxacin     Flagyl [metronidazole]     Lisinopril Other (See Comments)     cough    Mysoline [primidone]     Omnicef [cefdinir]      Objective:     Vitals:    07/30/19 1319 07/30/19 1343   BP: (!) 156/69 (!) 154/79   BP Location: Left arm Left arm   Patient Position: Sitting Sitting   BP Method: Large (Automatic)    Pulse: 71    SpO2: 100%    Weight: 75.5 kg (166 lb 8.9 oz)    Height: 5' 10" (1.778 m)         Physical Exam   Constitutional: He is oriented to person, place, and time. He appears well-developed and well-nourished. No distress.   Eyes: No scleral icterus.   Neck: No JVD present. Carotid bruit is not present.   Cardiovascular: Regular rhythm and normal heart sounds. Exam reveals no gallop and no friction rub.   No murmur heard.  Pulmonary/Chest: Effort normal and breath sounds normal. No respiratory distress.   Musculoskeletal: He exhibits edema (trivial bilateral).   Neurological: He is alert and oriented to person, place, and " time.   Skin: Skin is warm and dry. He is not diaphoretic.   Psychiatric: He has a normal mood and affect. His behavior is normal. Judgment and thought content normal.   Vitals reviewed.     ECG: NSR with first degree AV block.  Baseline artifact V6    Lab 4/10/19:  WBC 3.6  Hgb 10.4  MCV 99  Plt 114  K 5.5  Creat 1.43  BUN 30   Glu 130    Assessment:     1. S/P CABG x 1    2. Hypertension due to endocrine disorder    3. H/O bacterial endocarditis    4. H/O atrial flutter    5. S/P MVR (mitral valve replacement)    6. CKD (chronic kidney disease) stage 3, GFR 30-59 ml/min    7. Hyperkalemia    8. Essential hypertension      Plan:   Rodolfo was seen today for follow-up.    Diagnoses and all orders for this visit:    S/P CABG x 1  -     IN OFFICE EKG 12-LEAD (to Muse)    Hypertension due to endocrine disorder    H/O bacterial endocarditis    H/O atrial flutter    S/P MVR (mitral valve replacement)  -     IN OFFICE EKG 12-LEAD (to Muse)    CKD (chronic kidney disease) stage 3, GFR 30-59 ml/min    Hyperkalemia    Essential hypertension  -     amLODIPine (NORVASC) 5 MG tablet; Take 1 tablet (5 mg total) by mouth once daily.       F/u with nephrologist   F/u next month with Dr. Melton, oncologist due to hx of non-Hodgkin's lymphoma    Same meds except increase the amlodipine to 5 mg daily.  An alternative would be hydralazine should edema be a recurrent problem.  Note that metoprolol cause pt diarrhea and lisinopril caused a cough.  Will avoid ARB due to hyperkalemia.  Will avoid HCTZ due to CKD.      Pt knows to avoid NSAID's    Repeat lab     If FBS is elevated will get a HgbA1C  Follow up in about 4 weeks (around 8/27/2019).

## 2019-08-07 DIAGNOSIS — H65.91 FLUID LEVEL BEHIND TYMPANIC MEMBRANE OF RIGHT EAR: ICD-10-CM

## 2019-08-07 RX ORDER — FLUTICASONE PROPIONATE 50 MCG
SPRAY, SUSPENSION (ML) NASAL
Qty: 32 G | Refills: 3 | Status: SHIPPED | OUTPATIENT
Start: 2019-08-07 | End: 2020-06-26

## 2019-08-08 ENCOUNTER — TELEPHONE (OUTPATIENT)
Dept: CARDIOLOGY | Facility: CLINIC | Age: 69
End: 2019-08-08

## 2019-08-08 ENCOUNTER — LAB VISIT (OUTPATIENT)
Dept: LAB | Facility: HOSPITAL | Age: 69
End: 2019-08-08
Attending: INTERNAL MEDICINE
Payer: MEDICARE

## 2019-08-08 DIAGNOSIS — I10 ESSENTIAL HYPERTENSION: Chronic | ICD-10-CM

## 2019-08-08 DIAGNOSIS — C83.00 SMALL B-CELL LYMPHOMA, UNSPECIFIED BODY REGION: ICD-10-CM

## 2019-08-08 LAB
ALBUMIN SERPL BCP-MCNC: 4 G/DL (ref 3.5–5.2)
ALBUMIN SERPL BCP-MCNC: 4 G/DL (ref 3.5–5.2)
ALP SERPL-CCNC: 65 U/L (ref 38–126)
ALP SERPL-CCNC: 65 U/L (ref 38–126)
ALT SERPL W/O P-5'-P-CCNC: 20 U/L (ref 10–44)
ALT SERPL W/O P-5'-P-CCNC: 20 U/L (ref 10–44)
ANION GAP SERPL CALC-SCNC: 4 MMOL/L (ref 8–16)
ANION GAP SERPL CALC-SCNC: 4 MMOL/L (ref 8–16)
AST SERPL-CCNC: 25 U/L (ref 15–46)
AST SERPL-CCNC: 25 U/L (ref 15–46)
BASOPHILS # BLD AUTO: 0.02 K/UL (ref 0–0.2)
BASOPHILS # BLD AUTO: 0.02 K/UL (ref 0–0.2)
BASOPHILS NFR BLD: 0.4 % (ref 0–1.9)
BASOPHILS NFR BLD: 0.4 % (ref 0–1.9)
BILIRUB SERPL-MCNC: 1.1 MG/DL (ref 0.1–1)
BILIRUB SERPL-MCNC: 1.1 MG/DL (ref 0.1–1)
BUN SERPL-MCNC: 33 MG/DL (ref 2–20)
BUN SERPL-MCNC: 33 MG/DL (ref 2–20)
CALCIUM SERPL-MCNC: 9.6 MG/DL (ref 8.7–10.5)
CALCIUM SERPL-MCNC: 9.6 MG/DL (ref 8.7–10.5)
CHLORIDE SERPL-SCNC: 113 MMOL/L (ref 95–110)
CHLORIDE SERPL-SCNC: 113 MMOL/L (ref 95–110)
CHOLEST SERPL-MCNC: 114 MG/DL (ref 120–199)
CHOLEST/HDLC SERPL: 2 {RATIO} (ref 2–5)
CO2 SERPL-SCNC: 25 MMOL/L (ref 23–29)
CO2 SERPL-SCNC: 25 MMOL/L (ref 23–29)
CREAT SERPL-MCNC: 1.46 MG/DL (ref 0.5–1.4)
CREAT SERPL-MCNC: 1.46 MG/DL (ref 0.5–1.4)
DIFFERENTIAL METHOD: ABNORMAL
DIFFERENTIAL METHOD: ABNORMAL
EOSINOPHIL # BLD AUTO: 0.2 K/UL (ref 0–0.5)
EOSINOPHIL # BLD AUTO: 0.2 K/UL (ref 0–0.5)
EOSINOPHIL NFR BLD: 4.4 % (ref 0–8)
EOSINOPHIL NFR BLD: 4.4 % (ref 0–8)
ERYTHROCYTE [DISTWIDTH] IN BLOOD BY AUTOMATED COUNT: 13.4 % (ref 11.5–14.5)
ERYTHROCYTE [DISTWIDTH] IN BLOOD BY AUTOMATED COUNT: 13.4 % (ref 11.5–14.5)
EST. GFR  (AFRICAN AMERICAN): 55.9 ML/MIN/1.73 M^2
EST. GFR  (AFRICAN AMERICAN): 55.9 ML/MIN/1.73 M^2
EST. GFR  (NON AFRICAN AMERICAN): 48.4 ML/MIN/1.73 M^2
EST. GFR  (NON AFRICAN AMERICAN): 48.4 ML/MIN/1.73 M^2
GLUCOSE SERPL-MCNC: 95 MG/DL (ref 70–110)
GLUCOSE SERPL-MCNC: 95 MG/DL (ref 70–110)
HAPTOGLOB SERPL-MCNC: 112 MG/DL (ref 30–250)
HCT VFR BLD AUTO: 34.4 % (ref 40–54)
HCT VFR BLD AUTO: 34.4 % (ref 40–54)
HDLC SERPL-MCNC: 56 MG/DL (ref 40–75)
HDLC SERPL: 49.1 % (ref 20–50)
HGB BLD-MCNC: 11.2 G/DL (ref 14–18)
HGB BLD-MCNC: 11.2 G/DL (ref 14–18)
LDH SERPL L TO P-CCNC: 178 U/L (ref 110–260)
LDLC SERPL CALC-MCNC: 49 MG/DL (ref 63–159)
LYMPHOCYTES # BLD AUTO: 1.1 K/UL (ref 1–4.8)
LYMPHOCYTES # BLD AUTO: 1.1 K/UL (ref 1–4.8)
LYMPHOCYTES NFR BLD: 23.3 % (ref 18–48)
LYMPHOCYTES NFR BLD: 23.3 % (ref 18–48)
MCH RBC QN AUTO: 32.6 PG (ref 27–31)
MCH RBC QN AUTO: 32.6 PG (ref 27–31)
MCHC RBC AUTO-ENTMCNC: 32.6 G/DL (ref 32–36)
MCHC RBC AUTO-ENTMCNC: 32.6 G/DL (ref 32–36)
MCV RBC AUTO: 100 FL (ref 82–98)
MCV RBC AUTO: 100 FL (ref 82–98)
MONOCYTES # BLD AUTO: 0.4 K/UL (ref 0.3–1)
MONOCYTES # BLD AUTO: 0.4 K/UL (ref 0.3–1)
MONOCYTES NFR BLD: 8.5 % (ref 4–15)
MONOCYTES NFR BLD: 8.5 % (ref 4–15)
NEUTROPHILS # BLD AUTO: 2.9 K/UL (ref 1.8–7.7)
NEUTROPHILS # BLD AUTO: 2.9 K/UL (ref 1.8–7.7)
NEUTROPHILS NFR BLD: 63.4 % (ref 38–73)
NEUTROPHILS NFR BLD: 63.4 % (ref 38–73)
NONHDLC SERPL-MCNC: 58 MG/DL
PLATELET # BLD AUTO: 117 K/UL (ref 150–350)
PLATELET # BLD AUTO: 117 K/UL (ref 150–350)
PMV BLD AUTO: 10.7 FL (ref 9.2–12.9)
PMV BLD AUTO: 10.7 FL (ref 9.2–12.9)
POTASSIUM SERPL-SCNC: 4.7 MMOL/L (ref 3.5–5.1)
POTASSIUM SERPL-SCNC: 4.7 MMOL/L (ref 3.5–5.1)
PROT SERPL-MCNC: 6.9 G/DL (ref 6–8.4)
PROT SERPL-MCNC: 6.9 G/DL (ref 6–8.4)
RBC # BLD AUTO: 3.44 M/UL (ref 4.6–6.2)
RBC # BLD AUTO: 3.44 M/UL (ref 4.6–6.2)
SODIUM SERPL-SCNC: 142 MMOL/L (ref 136–145)
SODIUM SERPL-SCNC: 142 MMOL/L (ref 136–145)
TRIGL SERPL-MCNC: 45 MG/DL (ref 30–150)
TSH SERPL DL<=0.005 MIU/L-ACNC: 1.89 UIU/ML (ref 0.4–4)
WBC # BLD AUTO: 4.59 K/UL (ref 3.9–12.7)
WBC # BLD AUTO: 4.59 K/UL (ref 3.9–12.7)

## 2019-08-08 PROCEDURE — 83010 ASSAY OF HAPTOGLOBIN QUANT: CPT | Mod: HCNC,PO

## 2019-08-08 PROCEDURE — 80061 LIPID PANEL: CPT | Mod: HCNC

## 2019-08-08 PROCEDURE — 80053 COMPREHEN METABOLIC PANEL: CPT | Mod: HCNC,PO

## 2019-08-08 PROCEDURE — 36415 COLL VENOUS BLD VENIPUNCTURE: CPT | Mod: HCNC,PO

## 2019-08-08 PROCEDURE — 84443 ASSAY THYROID STIM HORMONE: CPT | Mod: HCNC,PO

## 2019-08-08 PROCEDURE — 83615 LACTATE (LD) (LDH) ENZYME: CPT | Mod: HCNC

## 2019-08-08 PROCEDURE — 85025 COMPLETE CBC W/AUTO DIFF WBC: CPT | Mod: HCNC,PO

## 2019-08-08 NOTE — TELEPHONE ENCOUNTER
Discussed results of lab with wife.  CKD stable.  Pt may wish to see a nephrologist again in follow-up

## 2019-08-09 ENCOUNTER — OFFICE VISIT (OUTPATIENT)
Dept: HEMATOLOGY/ONCOLOGY | Facility: CLINIC | Age: 69
End: 2019-08-09
Payer: MEDICARE

## 2019-08-09 VITALS
RESPIRATION RATE: 16 BRPM | DIASTOLIC BLOOD PRESSURE: 66 MMHG | HEART RATE: 69 BPM | WEIGHT: 163.13 LBS | HEIGHT: 70 IN | SYSTOLIC BLOOD PRESSURE: 141 MMHG | BODY MASS INDEX: 23.35 KG/M2 | OXYGEN SATURATION: 95 % | TEMPERATURE: 99 F

## 2019-08-09 DIAGNOSIS — D64.9 ANEMIA, UNSPECIFIED TYPE: Primary | ICD-10-CM

## 2019-08-09 DIAGNOSIS — C85.10 INDOLENT B-CELL LYMPHOMA: ICD-10-CM

## 2019-08-09 PROCEDURE — 1101F PR PT FALLS ASSESS DOC 0-1 FALLS W/OUT INJ PAST YR: ICD-10-PCS | Mod: HCNC,CPTII,S$GLB, | Performed by: INTERNAL MEDICINE

## 2019-08-09 PROCEDURE — 99999 PR PBB SHADOW E&M-EST. PATIENT-LVL IV: CPT | Mod: PBBFAC,HCNC,, | Performed by: INTERNAL MEDICINE

## 2019-08-09 PROCEDURE — 99499 RISK ADDL DX/OHS AUDIT: ICD-10-PCS | Mod: HCNC,S$GLB,, | Performed by: INTERNAL MEDICINE

## 2019-08-09 PROCEDURE — 1101F PT FALLS ASSESS-DOCD LE1/YR: CPT | Mod: HCNC,CPTII,S$GLB, | Performed by: INTERNAL MEDICINE

## 2019-08-09 PROCEDURE — 99999 PR PBB SHADOW E&M-EST. PATIENT-LVL IV: ICD-10-PCS | Mod: PBBFAC,HCNC,, | Performed by: INTERNAL MEDICINE

## 2019-08-09 PROCEDURE — 3078F DIAST BP <80 MM HG: CPT | Mod: HCNC,CPTII,S$GLB, | Performed by: INTERNAL MEDICINE

## 2019-08-09 PROCEDURE — 99214 PR OFFICE/OUTPT VISIT, EST, LEVL IV, 30-39 MIN: ICD-10-PCS | Mod: HCNC,S$GLB,, | Performed by: INTERNAL MEDICINE

## 2019-08-09 PROCEDURE — 99214 OFFICE O/P EST MOD 30 MIN: CPT | Mod: HCNC,S$GLB,, | Performed by: INTERNAL MEDICINE

## 2019-08-09 PROCEDURE — 3077F SYST BP >= 140 MM HG: CPT | Mod: HCNC,CPTII,S$GLB, | Performed by: INTERNAL MEDICINE

## 2019-08-09 PROCEDURE — 99499 UNLISTED E&M SERVICE: CPT | Mod: HCNC,S$GLB,, | Performed by: INTERNAL MEDICINE

## 2019-08-09 PROCEDURE — 3078F PR MOST RECENT DIASTOLIC BLOOD PRESSURE < 80 MM HG: ICD-10-PCS | Mod: HCNC,CPTII,S$GLB, | Performed by: INTERNAL MEDICINE

## 2019-08-09 PROCEDURE — 3077F PR MOST RECENT SYSTOLIC BLOOD PRESSURE >= 140 MM HG: ICD-10-PCS | Mod: HCNC,CPTII,S$GLB, | Performed by: INTERNAL MEDICINE

## 2019-08-09 NOTE — PROGRESS NOTES
PATIENT: Rodolfo Messina  MRN: [id number]  DATE: 8/12/2019      Diagnosis:   1. Anemia, unspecified type    2. Indolent B-cell lymphoma        Chief Complaint: Follow-up      Oncologic History:      Oncologic History     Oncologic Treatment     Pathology       Subjective:    Interval History: Mr. Messina is a 69 y.o. male who presents for follow up for low grade lymphoma.  Since the last clinic visit the patient states he has been very active and has had more energy.  He continues to eat well and has gained weight.  The patient denies fever, chills, night sweats, weight loss, new lumps or bumps, easy bruising or bleeding.    Please see previous clinic notes for heme history to date.         Past Medical History:   Past Medical History:   Diagnosis Date    ANNA (acute kidney injury) 2/22/2018    Anemia     Anxiety     BPH (benign prostatic hyperplasia)     Coarse tremors     Coronary artery disease of native artery of native heart with stable angina pectoris 5/21/2018    Diverticulosis     Encounter for blood transfusion     Endocarditis     Mechanical heart valve present     Non Hodgkin's lymphoma 8/2/2018    Urinary retention        Past Surgical HIstory:   Past Surgical History:   Procedure Laterality Date    AORTOCORONARY BYPASS-CABG N/A 5/25/2018    Performed by [facility] Go MD at [facility] OR 2ND FLR    APPENDECTOMY      COLONOSCOPY      COLONOSCOPY N/A 2/1/2018    Performed by Richar Payan MD at [facility] ENDO (4TH FLR)    ESOPHAGOGASTRODUODENOSCOPY (EGD) N/A 2/1/2018    Performed by Richar Payan MD at [facility] ENDO (4TH FLR)    EYE SURGERY Right     cataract extraction    FINGER SURGERY      FRACTURE SURGERY Right     index finger    Mitral Valve Replacement N/A 5/25/2018    Performed by Marvin Go MD at [facility] OR 2ND FLR    TONSILLECTOMY      TRANSESOPHAGEAL ECHOCARDIOGRAM (SOWMYA) N/A 2/27/2018    Performed by [facility] Diagnostic Provider at [facility] CATH LAB       Family History:   Family  History   Problem Relation Age of Onset    Stroke Mother     Heart disease Mother         CABG    Heart disease Father         CABG    No Known Problems Sister     No Known Problems Brother     Melanoma Neg Hx        Social History:  reports that he has never smoked. He has never used smokeless tobacco. He reports that he drinks alcohol. He reports that he does not use drugs.    Allergies:  Review of patient's allergies indicates:   Allergen Reactions    Shellfish containing products     Augmentin [amoxicillin-pot clavulanate]      Patient felt that it raised BP and requested to have it added as intolerance. Tolerated unasyn and ampicillin.    Ciprofloxacin     Flagyl [metronidazole]     Lisinopril Other (See Comments)     cough    Mysoline [primidone]     Omnicef [cefdinir]        Medications:  Current Outpatient Medications   Medication Sig Dispense Refill    amLODIPine (NORVASC) 5 MG tablet Take 1 tablet (5 mg total) by mouth once daily. 90 tablet 3    ascorbic acid, vitamin C, (VITAMIN C) 1000 MG tablet Take 1,000 mg by mouth every other day.      atorvastatin (LIPITOR) 40 MG tablet 1 tablet (40 mg total) by Per G Tube route once daily. 90 tablet 3    fluticasone propionate (FLONASE) 50 mcg/actuation nasal spray USE 1 SPRAY IN EACH NOSTRIL EVERY DAY 32 g 3    gabapentin (NEURONTIN) 100 MG capsule Take 100 mg by mouth every evening.      ketoconazole (NIZORAL) 2 % shampoo Wash hair with medicated shampoo at least 2x/week - let sit on scalp at least 5 minutes prior to rinsing 120 mL 5    lactose-reduced food (ENSURE HIGH PROTEIN-MUSCLE ORAL) Take by mouth once daily.       loratadine (CLARITIN) 10 mg tablet Take 10 mg by mouth once daily.      melatonin 10 mg Tab Take by mouth.      mirtazapine (REMERON) 30 MG tablet TAKE 1 TABLET EVERY EVENING 90 tablet 3    multivit-min/FA/lycopen/lutein (ADULTS 50 PLUS ORAL) Take by mouth every other day.       pantoprazole (PROTONIX) 40 MG tablet TAKE  "1 TABLET EVERY DAY 90 tablet 5    aspirin 81 MG Chew 4 tablets (324 mg total) by Per G Tube route once daily.  0    tamsulosin (FLOMAX) 0.4 mg Cap Take 2 capsules (0.8 mg total) by mouth once daily. 180 capsule 0     No current facility-administered medications for this visit.      Review of Systems   Constitutional: Negative for chills, diaphoresis, fever and weight loss.   Respiratory: Negative for cough, sputum production and shortness of breath.    Cardiovascular: Negative for chest pain and leg swelling.   Gastrointestinal: Negative for abdominal pain, constipation, diarrhea, nausea and vomiting.   Neurological: Negative for weakness and headaches.   Endo/Heme/Allergies: Does not bruise/bleed easily.       ECOG Performance Status: 1   Objective:      Vitals:   Vitals:    08/09/19 1401   BP: (!) 141/66   BP Location: Left arm   Patient Position: Sitting   BP Method: Large (Automatic)   Pulse: 69   Resp: 16   Temp: 98.9 °F (37.2 °C)   TempSrc: Oral   SpO2: 95%   Weight: 74 kg (163 lb 2.3 oz)   Height: 5' 10" (1.778 m)     BMI: Body mass index is 23.41 kg/m².    Physical Exam   Constitutional: He is oriented to person, place, and time and well-developed, well-nourished, and in no distress.   HENT:   Head: Normocephalic and atraumatic.   Mouth/Throat: Oropharynx is clear and moist. No oropharyngeal exudate.   Eyes: Pupils are equal, round, and reactive to light. Right eye exhibits no discharge. Left eye exhibits no discharge. No scleral icterus.   Neck: Normal range of motion. No JVD present.   Cardiovascular: Normal rate, regular rhythm and intact distal pulses. Exam reveals no gallop and no friction rub.   No murmur heard.  Pulmonary/Chest: Effort normal and breath sounds normal. No respiratory distress. He has no wheezes. He has no rales. He exhibits no tenderness.   Abdominal: Soft. Bowel sounds are normal. He exhibits no distension and no mass. There is no tenderness. There is no rebound and no guarding. "   Musculoskeletal: Normal range of motion. He exhibits no edema or tenderness.   Lymphadenopathy:        Head (right side): No submental and no submandibular adenopathy present.        Head (left side): No submental and no submandibular adenopathy present.     He has no cervical adenopathy.     He has no axillary adenopathy.        Right: No supraclavicular adenopathy present.        Left: No supraclavicular adenopathy present.   Neurological: He is alert and oriented to person, place, and time.   Skin: Skin is warm and dry. No rash noted. He is not diaphoretic. No erythema. No pallor.     Laboratory Data:  Lab Visit on 08/08/2019   Component Date Value Ref Range Status    WBC 08/08/2019 4.59  3.90 - 12.70 K/uL Final    RBC 08/08/2019 3.44* 4.60 - 6.20 M/uL Final    Hemoglobin 08/08/2019 11.2* 14.0 - 18.0 g/dL Final    Hematocrit 08/08/2019 34.4* 40.0 - 54.0 % Final    Mean Corpuscular Volume 08/08/2019 100* 82 - 98 fL Final    Mean Corpuscular Hemoglobin 08/08/2019 32.6* 27.0 - 31.0 pg Final    Mean Corpuscular Hemoglobin Conc 08/08/2019 32.6  32.0 - 36.0 g/dL Final    RDW 08/08/2019 13.4  11.5 - 14.5 % Final    Platelets 08/08/2019 117* 150 - 350 K/uL Final    MPV 08/08/2019 10.7  9.2 - 12.9 fL Final    Gran # (ANC) 08/08/2019 2.9  1.8 - 7.7 K/uL Final    Lymph # 08/08/2019 1.1  1.0 - 4.8 K/uL Final    Mono # 08/08/2019 0.4  0.3 - 1.0 K/uL Final    Eos # 08/08/2019 0.2  0.0 - 0.5 K/uL Final    Baso # 08/08/2019 0.02  0.00 - 0.20 K/uL Final    Gran% 08/08/2019 63.4  38.0 - 73.0 % Final    Lymph% 08/08/2019 23.3  18.0 - 48.0 % Final    Mono% 08/08/2019 8.5  4.0 - 15.0 % Final    Eosinophil% 08/08/2019 4.4  0.0 - 8.0 % Final    Basophil% 08/08/2019 0.4  0.0 - 1.9 % Final    Differential Method 08/08/2019 Automated   Final    Sodium 08/08/2019 142  136 - 145 mmol/L Final    Potassium 08/08/2019 4.7  3.5 - 5.1 mmol/L Final    Chloride 08/08/2019 113* 95 - 110 mmol/L Final    CO2 08/08/2019 25   23 - 29 mmol/L Final    Glucose 08/08/2019 95  70 - 110 mg/dL Final    BUN, Bld 08/08/2019 33* 2 - 20 mg/dL Final    Creatinine 08/08/2019 1.46* 0.50 - 1.40 mg/dL Final    Calcium 08/08/2019 9.6  8.7 - 10.5 mg/dL Final    Total Protein 08/08/2019 6.9  6.0 - 8.4 g/dL Final    Albumin 08/08/2019 4.0  3.5 - 5.2 g/dL Final    Total Bilirubin 08/08/2019 1.1* 0.1 - 1.0 mg/dL Final    Comment: For infants and newborns, interpretation of results should be based  on gestational age, weight and in agreement with clinical  observations.  Premature Infant recommended reference ranges:  Up to 24 hours.............<8.0 mg/dL  Up to 48 hours............<12.0 mg/dL  3-5 days..................<15.0 mg/dL  6-29 days.................<15.0 mg/dL      Alkaline Phosphatase 08/08/2019 65  38 - 126 U/L Final    AST 08/08/2019 25  15 - 46 U/L Final    ALT 08/08/2019 20  10 - 44 U/L Final    Anion Gap 08/08/2019 4* 8 - 16 mmol/L Final    eGFR if  08/08/2019 55.9* >60 mL/min/1.73 m^2 Final    eGFR if non African American 08/08/2019 48.4* >60 mL/min/1.73 m^2 Final    Comment: Calculation used to obtain the estimated glomerular filtration  rate (eGFR) is the CKD-EPI equation.       Cholesterol 08/08/2019 114* 120 - 199 mg/dL Final    Comment: The National Cholesterol Education Program (NCEP) has set the  following guidelines (reference ranges) for Cholesterol:  Optimal.....................<200 mg/dL  Borderline High.............200-239 mg/dL  High........................> or = 240 mg/dL      Triglycerides 08/08/2019 45  30 - 150 mg/dL Final    Comment: The National Cholesterol Education Program (NCEP) has set the  following guidelines (reference values) for triglycerides:  Normal......................<150 mg/dL  Borderline High.............150-199 mg/dL  High........................200-499 mg/dL      HDL 08/08/2019 56  40 - 75 mg/dL Final    Comment: The National Cholesterol Education Program (NCEP) has set  the  following guidelines (reference values) for HDL Cholesterol:  Low...............<40 mg/dL  Optimal...........>60 mg/dL      LDL Cholesterol 08/08/2019 49.0* 63.0 - 159.0 mg/dL Final    Comment: The National Cholesterol Education Program (NCEP) has set the  following guidelines (reference values) for LDL Cholesterol:  Optimal.......................<130 mg/dL  Borderline High...............130-159 mg/dL  High..........................160-189 mg/dL  Very High.....................>190 mg/dL      Hdl/Cholesterol Ratio 08/08/2019 49.1  20.0 - 50.0 % Final    Total Cholesterol/HDL Ratio 08/08/2019 2.0  2.0 - 5.0 Final    Non-HDL Cholesterol 08/08/2019 58  mg/dL Final    Comment: Risk category and Non-HDL cholesterol goals:  Coronary heart disease (CHD)or equivalent (10-year risk of CHD >20%):  Non-HDL cholesterol goal     <130 mg/dL  Two or more CHD risk factors and 10-year risk of CHD <= 20%:  Non-HDL cholesterol goal     <160 mg/dL  0 to 1 CHD risk factor:  Non-HDL cholesterol goal     <190 mg/dL      TSH 08/08/2019 1.890  0.400 - 4.000 uIU/mL Final    Comment: Warning:  Heterophilic antibodies in serum or plasma of   certain individuals are known to cause interference with   immunoassays. These antibodies may be present in blood samples   from individuals regularly exposed to animal or who have been   treated with animal products.  Patients taking high doses of supplemental biotin may have  negatively biased results.       WBC 08/08/2019 4.59  3.90 - 12.70 K/uL Final    RBC 08/08/2019 3.44* 4.60 - 6.20 M/uL Final    Hemoglobin 08/08/2019 11.2* 14.0 - 18.0 g/dL Final    Hematocrit 08/08/2019 34.4* 40.0 - 54.0 % Final    Mean Corpuscular Volume 08/08/2019 100* 82 - 98 fL Final    Mean Corpuscular Hemoglobin 08/08/2019 32.6* 27.0 - 31.0 pg Final    Mean Corpuscular Hemoglobin Conc 08/08/2019 32.6  32.0 - 36.0 g/dL Final    RDW 08/08/2019 13.4  11.5 - 14.5 % Final    Platelets 08/08/2019 117* 150 - 350  K/uL Final    MPV 08/08/2019 10.7  9.2 - 12.9 fL Final    Gran # (ANC) 08/08/2019 2.9  1.8 - 7.7 K/uL Final    Lymph # 08/08/2019 1.1  1.0 - 4.8 K/uL Final    Mono # 08/08/2019 0.4  0.3 - 1.0 K/uL Final    Eos # 08/08/2019 0.2  0.0 - 0.5 K/uL Final    Baso # 08/08/2019 0.02  0.00 - 0.20 K/uL Final    Gran% 08/08/2019 63.4  38.0 - 73.0 % Final    Lymph% 08/08/2019 23.3  18.0 - 48.0 % Final    Mono% 08/08/2019 8.5  4.0 - 15.0 % Final    Eosinophil% 08/08/2019 4.4  0.0 - 8.0 % Final    Basophil% 08/08/2019 0.4  0.0 - 1.9 % Final    Differential Method 08/08/2019 Automated   Final    Sodium 08/08/2019 142  136 - 145 mmol/L Final    Potassium 08/08/2019 4.7  3.5 - 5.1 mmol/L Final    Chloride 08/08/2019 113* 95 - 110 mmol/L Final    CO2 08/08/2019 25  23 - 29 mmol/L Final    Glucose 08/08/2019 95  70 - 110 mg/dL Final    BUN, Bld 08/08/2019 33* 2 - 20 mg/dL Final    Creatinine 08/08/2019 1.46* 0.50 - 1.40 mg/dL Final    Calcium 08/08/2019 9.6  8.7 - 10.5 mg/dL Final    Total Protein 08/08/2019 6.9  6.0 - 8.4 g/dL Final    Albumin 08/08/2019 4.0  3.5 - 5.2 g/dL Final    Total Bilirubin 08/08/2019 1.1* 0.1 - 1.0 mg/dL Final    Comment: For infants and newborns, interpretation of results should be based  on gestational age, weight and in agreement with clinical  observations.  Premature Infant recommended reference ranges:  Up to 24 hours.............<8.0 mg/dL  Up to 48 hours............<12.0 mg/dL  3-5 days..................<15.0 mg/dL  6-29 days.................<15.0 mg/dL      Alkaline Phosphatase 08/08/2019 65  38 - 126 U/L Final    AST 08/08/2019 25  15 - 46 U/L Final    ALT 08/08/2019 20  10 - 44 U/L Final    Anion Gap 08/08/2019 4* 8 - 16 mmol/L Final    eGFR if  08/08/2019 55.9* >60 mL/min/1.73 m^2 Final    eGFR if non African American 08/08/2019 48.4* >60 mL/min/1.73 m^2 Final    Comment: Calculation used to obtain the estimated glomerular filtration  rate (eGFR) is the  CKD-EPI equation.       LD 08/08/2019 178  110 - 260 U/L Final    Results are increased in hemolyzed samples.    Haptoglobin 08/08/2019 112  30 - 250 mg/dL Final         Imaging:  Reviewed      Assessment:       1. Anemia, unspecified type    2. Indolent B-cell lymphoma           Plan:     B-Cell Lymphoma - The patient has unclassifiable  indolent non-hodgkin's b cell  lymphoma with involvement in the bone marrow via BM biopsy on 7/13/18 with 5-10% of marrow involved and no myeloid disorders  -pan ct June 2019  With no evidence of adenopathy/HSM   -mild negligible anemia likely anemia of renal insufficiency is stable  -mild thrombocytopenia asymptomatic and stable; likely sequestration vs low grade ITP; no intervention needed; continue to monitor   -If patient requires treatment in the future will consider repeat BM biopsy and rituximab.  -educated on symptoms for which to seek attn in our clinic earlier      FU: cbc cmp, ldh and md appt in 6 months          Distress Screening Results: Psychosocial Distress screening score of Distress Score: 6 noted and reviewed. No intervention indicated.

## 2019-08-12 NOTE — PROGRESS NOTES
Patient, Rodolfo Messina (MRN #[id number]), presented with a recent Platelet count less than 150 K/uL consistent with the definition of thrombocytopenia (ICD10 - D69.6).    Platelets   Date Value Ref Range Status   08/08/2019 117 (L) 150 - 350 K/uL Final   08/08/2019 117 (L) 150 - 350 K/uL Final     The patient's thrombocytopenia was monitored, evaluated, addressed and/or treated. This addendum to the medical record is made on 08/12/2019.

## 2019-09-10 ENCOUNTER — OFFICE VISIT (OUTPATIENT)
Dept: CARDIOLOGY | Facility: CLINIC | Age: 69
End: 2019-09-10
Payer: MEDICARE

## 2019-09-10 VITALS
HEART RATE: 86 BPM | BODY MASS INDEX: 23.6 KG/M2 | SYSTOLIC BLOOD PRESSURE: 117 MMHG | DIASTOLIC BLOOD PRESSURE: 65 MMHG | WEIGHT: 164.88 LBS | HEIGHT: 70 IN

## 2019-09-10 DIAGNOSIS — Z95.1 S/P CABG X 1: Primary | ICD-10-CM

## 2019-09-10 DIAGNOSIS — Z86.79 H/O ATRIAL FLUTTER: ICD-10-CM

## 2019-09-10 DIAGNOSIS — N18.30 CKD (CHRONIC KIDNEY DISEASE) STAGE 3, GFR 30-59 ML/MIN: ICD-10-CM

## 2019-09-10 DIAGNOSIS — Z95.2 S/P MVR (MITRAL VALVE REPLACEMENT): ICD-10-CM

## 2019-09-10 DIAGNOSIS — I10 ESSENTIAL HYPERTENSION: Chronic | ICD-10-CM

## 2019-09-10 DIAGNOSIS — Z86.79 H/O BACTERIAL ENDOCARDITIS: ICD-10-CM

## 2019-09-10 PROCEDURE — 3078F PR MOST RECENT DIASTOLIC BLOOD PRESSURE < 80 MM HG: ICD-10-PCS | Mod: HCNC,CPTII,S$GLB, | Performed by: INTERNAL MEDICINE

## 2019-09-10 PROCEDURE — 3074F PR MOST RECENT SYSTOLIC BLOOD PRESSURE < 130 MM HG: ICD-10-PCS | Mod: HCNC,CPTII,S$GLB, | Performed by: INTERNAL MEDICINE

## 2019-09-10 PROCEDURE — 3074F SYST BP LT 130 MM HG: CPT | Mod: HCNC,CPTII,S$GLB, | Performed by: INTERNAL MEDICINE

## 2019-09-10 PROCEDURE — 99213 PR OFFICE/OUTPT VISIT, EST, LEVL III, 20-29 MIN: ICD-10-PCS | Mod: HCNC,S$GLB,, | Performed by: INTERNAL MEDICINE

## 2019-09-10 PROCEDURE — 99999 PR PBB SHADOW E&M-EST. PATIENT-LVL II: CPT | Mod: PBBFAC,HCNC,, | Performed by: INTERNAL MEDICINE

## 2019-09-10 PROCEDURE — 1101F PR PT FALLS ASSESS DOC 0-1 FALLS W/OUT INJ PAST YR: ICD-10-PCS | Mod: HCNC,CPTII,S$GLB, | Performed by: INTERNAL MEDICINE

## 2019-09-10 PROCEDURE — 99999 PR PBB SHADOW E&M-EST. PATIENT-LVL II: ICD-10-PCS | Mod: PBBFAC,HCNC,, | Performed by: INTERNAL MEDICINE

## 2019-09-10 PROCEDURE — 3078F DIAST BP <80 MM HG: CPT | Mod: HCNC,CPTII,S$GLB, | Performed by: INTERNAL MEDICINE

## 2019-09-10 PROCEDURE — 1101F PT FALLS ASSESS-DOCD LE1/YR: CPT | Mod: HCNC,CPTII,S$GLB, | Performed by: INTERNAL MEDICINE

## 2019-09-10 PROCEDURE — 99213 OFFICE O/P EST LOW 20 MIN: CPT | Mod: HCNC,S$GLB,, | Performed by: INTERNAL MEDICINE

## 2019-09-10 RX ORDER — MAGNESIUM 250 MG
TABLET ORAL DAILY
COMMUNITY

## 2019-09-10 NOTE — PROGRESS NOTES
Subjective:      Patient ID: Rodolfo Messina is a 69 y.o. male.    Chief Complaint: Hypertension (Blood pressure follow up)    HPI:  Feels good.    Went to Urgent care for a sinus infection and received antibiotics and a steroid shot.    Oncologist said everything is OK    Pt saw Dr Daly for enlarged prostate.    Pt had been taking tamsulosin    Walks in the park    Washed 2 cars this AM    Mows the lawn    Review of Systems   Cardiovascular: Negative for chest pain, claudication, dyspnea on exertion, irregular heartbeat, leg swelling, near-syncope, orthopnea, palpitations and syncope.        Past Medical History:   Diagnosis Date    ANNA (acute kidney injury) 2/22/2018    Anemia     Anxiety     BPH (benign prostatic hyperplasia)     Coarse tremors     Coronary artery disease of native artery of native heart with stable angina pectoris 5/21/2018    Diverticulosis     Encounter for blood transfusion     Endocarditis     Mechanical heart valve present     Non Hodgkin's lymphoma 8/2/2018    Urinary retention         Past Surgical History:   Procedure Laterality Date    AORTOCORONARY BYPASS-CABG N/A 5/25/2018    Performed by [facility] Go [facility] at [facility] OR 2ND FLR    APPENDECTOMY      COLONOSCOPY      COLONOSCOPY N/A 2/1/2018    Performed by Richar Payan MD at [facility] ENDO (4TH FLR)    ESOPHAGOGASTRODUODENOSCOPY (EGD) N/A 2/1/2018    Performed by Richar Payan MD at [facility] ENDO (4TH FLR)    EYE SURGERY Right     cataract extraction    FINGER SURGERY      FRACTURE SURGERY Right     index finger    Mitral Valve Replacement N/A 5/25/2018    Performed by [facility] at [facility] OR 2ND FLR    TONSILLECTOMY      TRANSESOPHAGEAL ECHOCARDIOGRAM (SOWMYA) N/A 2/27/2018    Performed by [facility] at [facility] CATH LAB       Family History   Problem Relation Age of Onset    Stroke Mother     Heart disease Mother         CABG    Heart disease Father         CABG    No Known Problems Sister      No Known Problems Brother     Melanoma Neg Hx        Social History     Socioeconomic History    Marital status:      Spouse name: Not on file    Number of children: Not on file    Years of education: Not on file    Highest education level: Not on file   Occupational History     Employer: [employer]   Social Needs    Financial resource strain: Not on file    Food insecurity:     Worry: Not on file     Inability: Not on file    Transportation needs:     Medical: Not on file     Non-medical: Not on file   Tobacco Use    Smoking status: Never Smoker    Smokeless tobacco: Never Used   Substance and Sexual Activity    Alcohol use: Yes    Drug use: No    Sexual activity: Not Currently     Partners: Female   Lifestyle    Physical activity:     Days per week: Not on file     Minutes per session: Not on file    Stress: Not on file   Relationships    Social connections:     Talks on phone: Not on file     Gets together: Not on file     Attends Bahai service: Not on file     Active member of club or organization: Not on file     Attends meetings of clubs or organizations: Not on file     Relationship status: Not on file   Other Topics Concern    Not on file   Social History Narrative    Not on file       Current Outpatient Medications on File Prior to Visit   Medication Sig Dispense Refill    amLODIPine (NORVASC) 5 MG tablet Take 1 tablet (5 mg total) by mouth once daily. 90 tablet 3    ascorbic acid, vitamin C, (VITAMIN C) 1000 MG tablet Take 1,000 mg by mouth every other day.      aspirin 81 MG Chew 4 tablets (324 mg total) by Per G Tube route once daily.  0    atorvastatin (LIPITOR) 40 MG tablet 1 tablet (40 mg total) by Per G Tube route once daily. 90 tablet 3    fluticasone propionate (FLONASE) 50 mcg/actuation nasal spray USE 1 SPRAY IN EACH NOSTRIL EVERY DAY 32 g 3    gabapentin (NEURONTIN) 100 MG capsule Take 100 mg by mouth every evening.      ketoconazole (NIZORAL) 2 % shampoo Wash  "hair with medicated shampoo at least 2x/week - let sit on scalp at least 5 minutes prior to rinsing 120 mL 5    lactose-reduced food (ENSURE HIGH PROTEIN-MUSCLE ORAL) Take by mouth once daily.       loratadine (CLARITIN) 10 mg tablet Take 10 mg by mouth once daily.      magnesium 250 mg Tab Take by mouth once daily.      melatonin 10 mg Tab Take by mouth.      mirtazapine (REMERON) 30 MG tablet TAKE 1 TABLET EVERY EVENING 90 tablet 3    multivit-min/FA/lycopen/lutein (ADULTS 50 PLUS ORAL) Take by mouth every other day.       pantoprazole (PROTONIX) 40 MG tablet TAKE 1 TABLET EVERY DAY 90 tablet 5    SAW PALMETTO ORAL Take 450 mg by mouth once daily.      tamsulosin (FLOMAX) 0.4 mg Cap Take 2 capsules (0.8 mg total) by mouth once daily. 180 capsule 0     No current facility-administered medications on file prior to visit.        Review of patient's allergies indicates:   Allergen Reactions    Metoprolol Diarrhea    Shellfish containing products     Augmentin [amoxicillin-pot clavulanate]      Patient felt that it raised BP and requested to have it added as intolerance. Tolerated unasyn and ampicillin.    Ciprofloxacin     Flagyl [metronidazole]     Lisinopril Other (See Comments)     cough    Mysoline [primidone]     Omnicef [cefdinir]      Objective:     Vitals:    09/10/19 1417   BP: 117/65   BP Location: Left arm   Patient Position: Sitting   BP Method: Large (Automatic)   Pulse: 86   Weight: 74.8 kg (164 lb 14.5 oz)   Height: 5' 10" (1.778 m)        Physical Exam   Constitutional: He is oriented to person, place, and time. He appears well-developed and well-nourished. No distress.   Eyes: No scleral icterus.   Neck: No JVD present. Carotid bruit is not present.   Cardiovascular: Regular rhythm. Exam reveals gallop (S4). Exam reveals no friction rub.   Murmur (I/VI systolic ) heard.  Pulmonary/Chest: Effort normal and breath sounds normal. No respiratory distress.   Musculoskeletal: He exhibits no " edema.   Neurological: He is alert and oriented to person, place, and time.   Skin: Skin is warm and dry. He is not diaphoretic.   Psychiatric: He has a normal mood and affect. His behavior is normal. Judgment and thought content normal.   Vitals reviewed.     BP log at home WNL    Assessment:     1. S/P CABG x 1    2. S/P MVR (mitral valve replacement)    3. Essential hypertension    4. H/O bacterial endocarditis    5. H/O atrial flutter    6. CKD (chronic kidney disease) stage 3, GFR 30-59 ml/min      Plan:   Rodolfo was seen today for hypertension.    Diagnoses and all orders for this visit:    S/P CABG x 1    S/P MVR (mitral valve replacement)    Essential hypertension    H/O bacterial endocarditis    H/O atrial flutter    CKD (chronic kidney disease) stage 3, GFR 30-59 ml/min     Same meds    RTC 6 months    Lab on return    Follow up in about 6 months (around 3/10/2020).

## 2019-10-09 RX ORDER — ATORVASTATIN CALCIUM 40 MG/1
TABLET, FILM COATED ORAL
Qty: 90 TABLET | Refills: 3 | Status: SHIPPED | OUTPATIENT
Start: 2019-10-09 | End: 2020-10-13 | Stop reason: SDUPTHER

## 2019-10-12 NOTE — PHYSICIAN QUERY
PT Name: Rodolfo Messina  MR #: [id number]    Physician Query Form -Present on Admission (POA) Diagnosis Clarification     CDS/: Damaris Hess RN            Contact information: [email or web address]    This form is a permanent document in the medical record.     Query Date: March 14, 2018    By submitting this query, we are merely seeking further clarification of documentation. Please utilize your independent clinical judgment when addressing the question(s) below.       The Medical record contains the following:    Diagnosis      Supporting Clinical Information   Location in Medical Record   Hypoxemic respiratory failure       FINDINGS: Cardiac silhouette is not significantly enlarged and stable in size.  Minimal linear subsegmental atelectasis at the lung bases. No focal consolidation.  No pleural effusion or pneumothorax.  No acute bony abnormality is identified      # Cough  - persistent dry cough since URI around [place]   - CXR w/o acute process   - phenergan w codeine and duonebs prn cough   - CT chest as mentioned above     Mild interval worsening of bilateral airspace disease suggestive of pulmonary edema versus pneumonia or aspiration.    The patient developed hypoxemia overnight 2/23 into 2/24 (87% on RA) and required 2-3L NC. CXRY on 2/24 revealed interval development of bilateral patchy interstitial and parietal attenuation reflective of edema vs infection. The patient was given a x1 dose of lasix 20mg and maintenance IVF were discontinued. On the evening of 2/24 the patient developed worsening infiltrates and increasing oxygen requirements, now needing comfort flow 30L 80% to maintain oxygen levels. Critical Care Medicine was consulted for hypoxemic respiratory failure.     Acute hypoxemic respiratory failure POA, yes     CXR 2/21                        H & P 2/22 Hospital Medicine                  CXR 2/25            H & P 2/25 Critical  care                                                Progress notes 3/1 Hospital Medicine           Doctor, Please specify Present On Admission (POA) status of __Acute hypoxemic respiratory failure_________.    [x  ] Present on Admission   [  ] Not Present on Admission  [  ] Clinically undetermined             Abdomen soft, mod RLQ -tenderness , no guarding.

## 2019-10-14 ENCOUNTER — HOSPITAL ENCOUNTER (EMERGENCY)
Facility: HOSPITAL | Age: 69
Discharge: ANOTHER HOSPITAL | End: 2019-10-14
Attending: EMERGENCY MEDICINE
Payer: MEDICARE

## 2019-10-14 ENCOUNTER — HOSPITAL ENCOUNTER (INPATIENT)
Facility: HOSPITAL | Age: 69
LOS: 3 days | Discharge: HOME | DRG: 472 | End: 2019-10-17
Attending: EMERGENCY MEDICINE | Admitting: NEUROLOGICAL SURGERY
Payer: MEDICARE

## 2019-10-14 ENCOUNTER — ANESTHESIA EVENT (OUTPATIENT)
Dept: SURGERY | Facility: HOSPITAL | Age: 69
DRG: 472 | End: 2019-10-14
Payer: MEDICARE

## 2019-10-14 VITALS
SYSTOLIC BLOOD PRESSURE: 168 MMHG | BODY MASS INDEX: 24.05 KG/M2 | TEMPERATURE: 99 F | RESPIRATION RATE: 14 BRPM | DIASTOLIC BLOOD PRESSURE: 76 MMHG | WEIGHT: 168 LBS | HEART RATE: 78 BPM | HEIGHT: 70 IN | OXYGEN SATURATION: 98 %

## 2019-10-14 DIAGNOSIS — S12.590A COMPRESSION FRACTURE OF C6 VERTEBRA, INITIAL ENCOUNTER: Primary | ICD-10-CM

## 2019-10-14 DIAGNOSIS — S12.9XXA: ICD-10-CM

## 2019-10-14 DIAGNOSIS — I48.91 ATRIAL FIBRILLATION/FLUTTER: ICD-10-CM

## 2019-10-14 DIAGNOSIS — S12.591A OTHER CLOSED NONDISPLACED FRACTURE OF SIXTH CERVICAL VERTEBRA, INITIAL ENCOUNTER: Primary | ICD-10-CM

## 2019-10-14 DIAGNOSIS — I48.92 ATRIAL FIBRILLATION/FLUTTER: ICD-10-CM

## 2019-10-14 DIAGNOSIS — M54.9 BACK PAIN: ICD-10-CM

## 2019-10-14 LAB
ABO + RH BLD: NORMAL
ALBUMIN SERPL BCP-MCNC: 3.8 G/DL (ref 3.5–5.2)
ALP SERPL-CCNC: 66 U/L (ref 55–135)
ALT SERPL W/O P-5'-P-CCNC: 12 U/L (ref 10–44)
ANION GAP SERPL CALC-SCNC: 8 MMOL/L (ref 8–16)
AST SERPL-CCNC: 19 U/L (ref 10–40)
BASOPHILS # BLD AUTO: 0.01 K/UL (ref 0–0.2)
BASOPHILS NFR BLD: 0.2 % (ref 0–1.9)
BILIRUB SERPL-MCNC: 1 MG/DL (ref 0.1–1)
BLD GP AB SCN CELLS X3 SERPL QL: NORMAL
BUN SERPL-MCNC: 23 MG/DL (ref 8–23)
CALCIUM SERPL-MCNC: 9.5 MG/DL (ref 8.7–10.5)
CHLORIDE SERPL-SCNC: 113 MMOL/L (ref 95–110)
CO2 SERPL-SCNC: 25 MMOL/L (ref 23–29)
CREAT SERPL-MCNC: 1.4 MG/DL (ref 0.5–1.4)
CREAT SERPL-MCNC: 1.5 MG/DL (ref 0.5–1.4)
DIFFERENTIAL METHOD: ABNORMAL
EOSINOPHIL # BLD AUTO: 0 K/UL (ref 0–0.5)
EOSINOPHIL NFR BLD: 0.5 % (ref 0–8)
ERYTHROCYTE [DISTWIDTH] IN BLOOD BY AUTOMATED COUNT: 13 % (ref 11.5–14.5)
EST. GFR  (AFRICAN AMERICAN): 54 ML/MIN/1.73 M^2
EST. GFR  (AFRICAN AMERICAN): 58.8 ML/MIN/1.73 M^2
EST. GFR  (NON AFRICAN AMERICAN): 47 ML/MIN/1.73 M^2
EST. GFR  (NON AFRICAN AMERICAN): 50.9 ML/MIN/1.73 M^2
GLUCOSE SERPL-MCNC: 102 MG/DL (ref 70–110)
HCT VFR BLD AUTO: 33.7 % (ref 40–54)
HGB BLD-MCNC: 10.7 G/DL (ref 14–18)
IMM GRANULOCYTES # BLD AUTO: 0.02 K/UL (ref 0–0.04)
IMM GRANULOCYTES NFR BLD AUTO: 0.3 % (ref 0–0.5)
INR PPP: 1 (ref 0.8–1.2)
LYMPHOCYTES # BLD AUTO: 0.9 K/UL (ref 1–4.8)
LYMPHOCYTES NFR BLD: 14.5 % (ref 18–48)
MCH RBC QN AUTO: 32.4 PG (ref 27–31)
MCHC RBC AUTO-ENTMCNC: 31.8 G/DL (ref 32–36)
MCV RBC AUTO: 102 FL (ref 82–98)
MONOCYTES # BLD AUTO: 0.4 K/UL (ref 0.3–1)
MONOCYTES NFR BLD: 6.9 % (ref 4–15)
NEUTROPHILS # BLD AUTO: 4.6 K/UL (ref 1.8–7.7)
NEUTROPHILS NFR BLD: 77.6 % (ref 38–73)
NRBC BLD-RTO: 0 /100 WBC
PLATELET # BLD AUTO: 120 K/UL (ref 150–350)
PMV BLD AUTO: 10.9 FL (ref 9.2–12.9)
POTASSIUM SERPL-SCNC: 4.7 MMOL/L (ref 3.5–5.1)
PROT SERPL-MCNC: 6.4 G/DL (ref 6–8.4)
PROTHROMBIN TIME: 10.6 SEC (ref 9–12.5)
RBC # BLD AUTO: 3.3 M/UL (ref 4.6–6.2)
SODIUM SERPL-SCNC: 146 MMOL/L (ref 136–145)
WBC # BLD AUTO: 5.94 K/UL (ref 3.9–12.7)

## 2019-10-14 PROCEDURE — 86901 BLOOD TYPING SEROLOGIC RH(D): CPT | Mod: HCNC

## 2019-10-14 PROCEDURE — 80053 COMPREHEN METABOLIC PANEL: CPT | Mod: HCNC

## 2019-10-14 PROCEDURE — 12000002 HC ACUTE/MED SURGE SEMI-PRIVATE ROOM: Mod: HCNC

## 2019-10-14 PROCEDURE — 82565 ASSAY OF CREATININE: CPT | Mod: HCNC

## 2019-10-14 PROCEDURE — 85610 PROTHROMBIN TIME: CPT | Mod: HCNC

## 2019-10-14 PROCEDURE — 99284 PR EMERGENCY DEPT VISIT,LEVEL IV: ICD-10-PCS | Mod: ,,, | Performed by: EMERGENCY MEDICINE

## 2019-10-14 PROCEDURE — 99284 EMERGENCY DEPT VISIT MOD MDM: CPT | Mod: ,,, | Performed by: EMERGENCY MEDICINE

## 2019-10-14 PROCEDURE — 85025 COMPLETE CBC W/AUTO DIFF WBC: CPT | Mod: HCNC

## 2019-10-14 PROCEDURE — 99285 EMERGENCY DEPT VISIT HI MDM: CPT | Mod: 25,27,HCNC

## 2019-10-14 PROCEDURE — 99285 EMERGENCY DEPT VISIT HI MDM: CPT | Mod: 25,HCNC

## 2019-10-14 PROCEDURE — 63600175 PHARM REV CODE 636 W HCPCS: Mod: HCNC | Performed by: STUDENT IN AN ORGANIZED HEALTH CARE EDUCATION/TRAINING PROGRAM

## 2019-10-14 PROCEDURE — 25000003 PHARM REV CODE 250: Mod: HCNC | Performed by: STUDENT IN AN ORGANIZED HEALTH CARE EDUCATION/TRAINING PROGRAM

## 2019-10-14 PROCEDURE — 99284 EMERGENCY DEPT VISIT MOD MDM: CPT | Mod: 25,27,HCNC

## 2019-10-14 RX ORDER — HYDROCODONE BITARTRATE AND ACETAMINOPHEN 5; 325 MG/1; MG/1
1 TABLET ORAL EVERY 6 HOURS PRN
Status: DISCONTINUED | OUTPATIENT
Start: 2019-10-14 | End: 2019-10-17

## 2019-10-14 RX ORDER — IBUPROFEN 200 MG
16 TABLET ORAL
Status: DISCONTINUED | OUTPATIENT
Start: 2019-10-14 | End: 2019-10-17

## 2019-10-14 RX ORDER — IBUPROFEN 200 MG
24 TABLET ORAL
Status: DISCONTINUED | OUTPATIENT
Start: 2019-10-14 | End: 2019-10-17

## 2019-10-14 RX ORDER — GLUCAGON 1 MG
1 KIT INJECTION
Status: DISCONTINUED | OUTPATIENT
Start: 2019-10-14 | End: 2019-10-14

## 2019-10-14 RX ORDER — SODIUM CHLORIDE 9 MG/ML
INJECTION, SOLUTION INTRAVENOUS CONTINUOUS
Status: DISCONTINUED | OUTPATIENT
Start: 2019-10-14 | End: 2019-10-17

## 2019-10-14 RX ORDER — METHOCARBAMOL 500 MG/1
500 TABLET, FILM COATED ORAL 4 TIMES DAILY
Status: DISCONTINUED | OUTPATIENT
Start: 2019-10-14 | End: 2019-10-17 | Stop reason: HOSPADM

## 2019-10-14 RX ORDER — DOCUSATE SODIUM 50 MG/5ML
100 LIQUID ORAL DAILY
Status: DISCONTINUED | OUTPATIENT
Start: 2019-10-15 | End: 2019-10-17 | Stop reason: HOSPADM

## 2019-10-14 RX ORDER — GLUCAGON 1 MG
1 KIT INJECTION
Status: DISCONTINUED | OUTPATIENT
Start: 2019-10-14 | End: 2019-10-17

## 2019-10-14 RX ORDER — FAMOTIDINE 20 MG/1
20 TABLET, FILM COATED ORAL 2 TIMES DAILY
Status: DISCONTINUED | OUTPATIENT
Start: 2019-10-14 | End: 2019-10-15

## 2019-10-14 RX ADMIN — METHOCARBAMOL TABLETS 500 MG: 500 TABLET, COATED ORAL at 10:10

## 2019-10-14 RX ADMIN — FAMOTIDINE 20 MG: 20 TABLET, FILM COATED ORAL at 10:10

## 2019-10-14 RX ADMIN — SODIUM CHLORIDE: 0.9 INJECTION, SOLUTION INTRAVENOUS at 10:10
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
## 2019-10-14 NOTE — ED NOTES
APPEARANCE: Alert, oriented and in no acute distress.  CARDIAC: Normal rate and rhythm, no murmur heard.   PERIPHERAL VASCULAR: peripheral pulses present. Normal cap refill. No edema. Warm to touch.    RESPIRATORY:Normal rate and effort, breath sounds clear bilaterally throughout chest. Respirations are equal and unlabored no obvious signs of distress.  GASTRO: soft, bowel sounds normal, no tenderness, no abdominal distention.  SKIN: Skin is warm and dry, normal skin turgor, mucous membranes moist.  NEURO: 5/5 strength major flexors/extensors bilaterally. Sensory intact to light touch bilaterally. [place] coma scale: eyes open spontaneously-4, oriented & converses-5, obeys commands-6. No neurological abnormalities.   MENTAL STATUS: awake, alert and aware of environment.  EYE: PERRL, both eyes: pupils brisk and reactive to light. Normal size.  ENT: EARS: no obvious drainage. NOSE: no active bleeding.
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
## 2019-10-15 ENCOUNTER — ANESTHESIA (OUTPATIENT)
Dept: SURGERY | Facility: HOSPITAL | Age: 69
DRG: 472 | End: 2019-10-15
Payer: MEDICARE

## 2019-10-15 PROBLEM — Z01.810 PREOP CARDIOVASCULAR EXAM: Status: ACTIVE | Noted: 2019-10-15

## 2019-10-15 PROBLEM — N40.0 BPH (BENIGN PROSTATIC HYPERPLASIA): Status: ACTIVE | Noted: 2019-10-15

## 2019-10-15 PROBLEM — I25.10 CAD (CORONARY ARTERY DISEASE): Status: ACTIVE | Noted: 2018-05-22

## 2019-10-15 LAB
ANION GAP SERPL CALC-SCNC: 8 MMOL/L (ref 8–16)
BASOPHILS # BLD AUTO: 0.02 K/UL (ref 0–0.2)
BASOPHILS NFR BLD: 0.4 % (ref 0–1.9)
BUN SERPL-MCNC: 22 MG/DL (ref 8–23)
CALCIUM SERPL-MCNC: 8.8 MG/DL (ref 8.7–10.5)
CHLORIDE SERPL-SCNC: 114 MMOL/L (ref 95–110)
CO2 SERPL-SCNC: 22 MMOL/L (ref 23–29)
CREAT SERPL-MCNC: 1.3 MG/DL (ref 0.5–1.4)
DIFFERENTIAL METHOD: ABNORMAL
EOSINOPHIL # BLD AUTO: 0.1 K/UL (ref 0–0.5)
EOSINOPHIL NFR BLD: 2.1 % (ref 0–8)
ERYTHROCYTE [DISTWIDTH] IN BLOOD BY AUTOMATED COUNT: 13 % (ref 11.5–14.5)
EST. GFR  (AFRICAN AMERICAN): >60 ML/MIN/1.73 M^2
EST. GFR  (NON AFRICAN AMERICAN): 55.7 ML/MIN/1.73 M^2
GLUCOSE SERPL-MCNC: 92 MG/DL (ref 70–110)
HCT VFR BLD AUTO: 30.2 % (ref 40–54)
HGB BLD-MCNC: 9.6 G/DL (ref 14–18)
IMM GRANULOCYTES # BLD AUTO: 0.01 K/UL (ref 0–0.04)
IMM GRANULOCYTES NFR BLD AUTO: 0.2 % (ref 0–0.5)
LYMPHOCYTES # BLD AUTO: 1.1 K/UL (ref 1–4.8)
LYMPHOCYTES NFR BLD: 20.1 % (ref 18–48)
MCH RBC QN AUTO: 32.5 PG (ref 27–31)
MCHC RBC AUTO-ENTMCNC: 31.8 G/DL (ref 32–36)
MCV RBC AUTO: 102 FL (ref 82–98)
MONOCYTES # BLD AUTO: 0.5 K/UL (ref 0.3–1)
MONOCYTES NFR BLD: 9.8 % (ref 4–15)
NEUTROPHILS # BLD AUTO: 3.5 K/UL (ref 1.8–7.7)
NEUTROPHILS NFR BLD: 67.4 % (ref 38–73)
NRBC BLD-RTO: 0 /100 WBC
PLATELET # BLD AUTO: 107 K/UL (ref 150–350)
PMV BLD AUTO: 10.9 FL (ref 9.2–12.9)
POTASSIUM SERPL-SCNC: 4.3 MMOL/L (ref 3.5–5.1)
RBC # BLD AUTO: 2.95 M/UL (ref 4.6–6.2)
SODIUM SERPL-SCNC: 144 MMOL/L (ref 136–145)
WBC # BLD AUTO: 5.22 K/UL (ref 3.9–12.7)

## 2019-10-15 PROCEDURE — 25000003 PHARM REV CODE 250: Mod: HCNC | Performed by: STUDENT IN AN ORGANIZED HEALTH CARE EDUCATION/TRAINING PROGRAM

## 2019-10-15 PROCEDURE — 63600175 PHARM REV CODE 636 W HCPCS: Mod: HCNC | Performed by: STUDENT IN AN ORGANIZED HEALTH CARE EDUCATION/TRAINING PROGRAM

## 2019-10-15 PROCEDURE — 93010 ELECTROCARDIOGRAM REPORT: CPT | Mod: HCNC,,, | Performed by: INTERNAL MEDICINE

## 2019-10-15 PROCEDURE — 99222 1ST HOSP IP/OBS MODERATE 55: CPT | Mod: HCNC,GC,, | Performed by: INTERNAL MEDICINE

## 2019-10-15 PROCEDURE — 99222 PR INITIAL HOSPITAL CARE,LEVL II: ICD-10-PCS | Mod: HCNC,GC,, | Performed by: INTERNAL MEDICINE

## 2019-10-15 PROCEDURE — 93005 ELECTROCARDIOGRAM TRACING: CPT | Mod: HCNC

## 2019-10-15 PROCEDURE — 93010 EKG 12-LEAD: ICD-10-PCS | Mod: HCNC,,, | Performed by: INTERNAL MEDICINE

## 2019-10-15 PROCEDURE — 20600001 HC STEP DOWN PRIVATE ROOM: Mod: HCNC

## 2019-10-15 PROCEDURE — 80048 BASIC METABOLIC PNL TOTAL CA: CPT | Mod: HCNC

## 2019-10-15 PROCEDURE — 85025 COMPLETE CBC W/AUTO DIFF WBC: CPT | Mod: HCNC

## 2019-10-15 RX ORDER — GABAPENTIN 100 MG/1
100 CAPSULE ORAL NIGHTLY
Status: DISCONTINUED | OUTPATIENT
Start: 2019-10-15 | End: 2019-10-17 | Stop reason: HOSPADM

## 2019-10-15 RX ORDER — PANTOPRAZOLE SODIUM 40 MG/1
40 TABLET, DELAYED RELEASE ORAL DAILY
Status: DISCONTINUED | OUTPATIENT
Start: 2019-10-15 | End: 2019-10-17 | Stop reason: HOSPADM

## 2019-10-15 RX ORDER — ATORVASTATIN CALCIUM 20 MG/1
40 TABLET, FILM COATED ORAL DAILY
Status: DISCONTINUED | OUTPATIENT
Start: 2019-10-15 | End: 2019-10-17 | Stop reason: HOSPADM

## 2019-10-15 RX ORDER — MIRTAZAPINE 15 MG/1
30 TABLET, FILM COATED ORAL NIGHTLY
Status: DISCONTINUED | OUTPATIENT
Start: 2019-10-15 | End: 2019-10-17 | Stop reason: HOSPADM

## 2019-10-15 RX ADMIN — HYDROCODONE BITARTRATE AND ACETAMINOPHEN 1 TABLET: 5; 325 TABLET ORAL at 12:10

## 2019-10-15 RX ADMIN — ATORVASTATIN CALCIUM 40 MG: 20 TABLET, FILM COATED ORAL at 04:10

## 2019-10-15 RX ADMIN — METHOCARBAMOL TABLETS 500 MG: 500 TABLET, COATED ORAL at 04:10

## 2019-10-15 RX ADMIN — SODIUM CHLORIDE: 0.9 INJECTION, SOLUTION INTRAVENOUS at 09:10

## 2019-10-15 RX ADMIN — METHOCARBAMOL TABLETS 500 MG: 500 TABLET, COATED ORAL at 12:10

## 2019-10-15 RX ADMIN — MIRTAZAPINE 30 MG: 15 TABLET, FILM COATED ORAL at 09:10

## 2019-10-15 RX ADMIN — FAMOTIDINE 20 MG: 20 TABLET, FILM COATED ORAL at 09:10

## 2019-10-15 RX ADMIN — GABAPENTIN 100 MG: 100 CAPSULE ORAL at 09:10

## 2019-10-15 RX ADMIN — HYDROCODONE BITARTRATE AND ACETAMINOPHEN 1 TABLET: 5; 325 TABLET ORAL at 04:10

## 2019-10-15 RX ADMIN — HYDROCODONE BITARTRATE AND ACETAMINOPHEN 1 TABLET: 5; 325 TABLET ORAL at 09:10

## 2019-10-15 RX ADMIN — METHOCARBAMOL TABLETS 500 MG: 500 TABLET, COATED ORAL at 09:10

## 2019-10-15 RX ADMIN — PANTOPRAZOLE SODIUM 40 MG: 40 TABLET, DELAYED RELEASE ORAL at 04:10

## 2019-10-15 NOTE — ASSESSMENT & PLAN NOTE
Hx CAD s/p CABG (5/2018) and severe MR 2/2 infective endocarditis s/p bioprosthetic MVR. Followed routinely outpatient by cardiologist Dr. Lockett.    Recommendations  - hold ASA for surgery  - continue atorvastatin 40mg daily
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
## 2019-10-15 NOTE — ASSESSMENT & PLAN NOTE
Hx of HTN managed with home amlodpine 5mg daily. Patient reports this dose has recently been increased to 10mg daily by cardiologist.    Recommendations:  - resume amlodipine at 5mg daily for BP control (SBP <180)

## 2019-10-15 NOTE — SUBJECTIVE & OBJECTIVE
Past Medical History:   Diagnosis Date    ANNA (acute kidney injury) 2/22/2018    Anemia     Anxiety     BPH (benign prostatic hyperplasia)     Coarse tremors     Coronary artery disease of native artery of native heart with stable angina pectoris 5/21/2018    Diverticulosis     Encounter for blood transfusion     Endocarditis     Mechanical heart valve present     Non Hodgkin's lymphoma 8/2/2018    Urinary retention        Past Surgical History:   Procedure Laterality Date    APPENDECTOMY      COLONOSCOPY      COLONOSCOPY N/A 2/1/2018    Procedure: COLONOSCOPY;  Surgeon: Richar Payan MD;  Location: [facility] ENDO (4TH FLR);  Service: Endoscopy;  Laterality: N/A;  PM prep    CORONARY ARTERY BYPASS GRAFT (CABG) N/A 5/25/2018    Procedure: AORTOCORONARY BYPASS-CABG;  Surgeon: Marvin Go MD;  Location: [facility] OR 2ND FLR;  Service: Cardiovascular;  Laterality: N/A;    EYE SURGERY Right     cataract extraction    FINGER SURGERY      FRACTURE SURGERY Right     index finger    MITRAL VALVE REPLACEMENT N/A 5/25/2018    Procedure: Mitral Valve Replacement;  Surgeon: Marvin Go MD;  [facility];  Service: Cardiovascular;  Laterality: N/A;    TONSILLECTOMY         Review of patient's allergies indicates:   Allergen Reactions    Metoprolol Diarrhea    Shellfish containing products     Augmentin [amoxicillin-pot clavulanate]      Patient felt that it raised BP and requested to have it added as intolerance. Tolerated unasyn and ampicillin.    Ciprofloxacin     Flagyl [metronidazole]     Lisinopril Other (See Comments)     cough    Mysoline [primidone]     Omnicef [cefdinir]        No current facility-administered medications on file prior to encounter.      Current Outpatient Medications on File Prior to Encounter   Medication Sig    amLODIPine (NORVASC) 5 MG tablet Take 1 tablet (5 mg total) by mouth once daily. (Patient taking differently: Take 10 mg by mouth once daily. )     ascorbic acid, vitamin C, (VITAMIN C) 1000 MG tablet Take 1,000 mg by mouth every other day.    aspirin 81 MG Chew 4 tablets (324 mg total) by Per G Tube route once daily.    atorvastatin (LIPITOR) 40 MG tablet GIVE 1 TABLET VIA G TUBE DAILY    gabapentin (NEURONTIN) 100 MG capsule Take 100 mg by mouth every evening.    magnesium 250 mg Tab Take by mouth once daily.    melatonin 10 mg Tab Take by mouth.    mirtazapine (REMERON) 30 MG tablet TAKE 1 TABLET EVERY EVENING    multivit-min/FA/lycopen/lutein (ADULTS 50 PLUS ORAL) Take by mouth every other day.     pantoprazole (PROTONIX) 40 MG tablet TAKE 1 TABLET EVERY DAY    SAW PALMETTO ORAL Take 450 mg by mouth once daily.    tamsulosin (FLOMAX) 0.4 mg Cap Take 2 capsules (0.8 mg total) by mouth once daily.    fluticasone propionate (FLONASE) 50 mcg/actuation nasal spray USE 1 SPRAY IN EACH NOSTRIL EVERY DAY    lactose-reduced food (ENSURE HIGH PROTEIN-MUSCLE ORAL) Take by mouth once daily.     loratadine (CLARITIN) 10 mg tablet Take 10 mg by mouth once daily.     Family History     Problem Relation (Age of Onset)    Heart disease Mother, Father    No Known Problems Sister, Brother    Stroke Mother        Tobacco Use    Smoking status: Never Smoker    Smokeless tobacco: Never Used   Substance and Sexual Activity    Alcohol use: Yes     Comment: social    Drug use: No    Sexual activity: Not Currently     Partners: Female     Review of Systems   Constitutional: Negative for chills and fever.   HENT: Positive for postnasal drip.    Eyes: Negative for photophobia and visual disturbance.   Respiratory: Negative for chest tightness and shortness of breath.    Cardiovascular: Negative for chest pain and palpitations.   Gastrointestinal: Negative for abdominal pain, nausea and vomiting.   Genitourinary: Negative for difficulty urinating and dysuria.   Musculoskeletal: Positive for neck pain. Negative for back pain.   Skin: Negative for wound.   Neurological:  Positive for headaches. Negative for dizziness, weakness and numbness.     Objective:     Vital Signs (Most Recent):  Temp: 98.1 °F (36.7 °C) (10/15/19 0815)  Pulse: 64 (10/15/19 1045)  Resp: 16 (10/15/19 1045)  BP: (!) 168/72 (10/15/19 1045)  SpO2: 95 % (10/15/19 1045) Vital Signs (24h Range):  Temp:  [98.1 °F (36.7 °C)-98.6 °F (37 °C)] 98.1 °F (36.7 °C)  Pulse:  [62-82] 64  Resp:  [16] 16  SpO2:  [94 %-98 %] 95 %  BP: (138-187)/(67-84) 168/72     Weight: 76.2 kg (168 lb)  Body mass index is 24.11 kg/m².    Physical Exam   Constitutional: He is oriented to person, place, and time. He appears well-developed and well-nourished.   HENT:   Head: Normocephalic and atraumatic.   Eyes: Conjunctivae are normal. No scleral icterus.   Neck:   Aspen collar in place   Cardiovascular: Normal rate and intact distal pulses.   Murmur heard.  Pulmonary/Chest: Effort normal and breath sounds normal. No respiratory distress. He has no wheezes. He has no rales.   Abdominal: Soft. Bowel sounds are normal. He exhibits no distension. There is no tenderness. There is no guarding.   Musculoskeletal: He exhibits no edema, tenderness or deformity.   Neurological: He is alert and oriented to person, place, and time. No sensory deficit.   Skin: Skin is warm and dry. He is not diaphoretic.   Psychiatric: He has a normal mood and affect.   Mildly distressed/anxious when talking about the accident       Significant Labs: All pertinent labs within the past 24 hours have been reviewed.    Significant Imaging: I have reviewed all pertinent imaging results/findings within the past 24 hours.
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
## 2019-10-15 NOTE — ASSESSMENT & PLAN NOTE
Patient has unclassifiable indolent non-hodgkin's B-cell lymphoma with involvement in the bone marrow via BM bx on 7/13 with 5-10% of marrow involved and no myeloid disorder. Followed by Dr. Melton in heme/onc outpatient clinic (most recently 8/9/19) - notes that if patient requires treatment in future will consider repeat BM biopsy and rituximab, but no intervention needed at this time.    Recommendations  - stable/asymptomatic mild anemia and thrombocytopenia   - monitor daily CBCs

## 2019-10-15 NOTE — ED NOTES
Report received from MIRANDA Acosta. Assume care of pt. Pt resting comfortably in stretcher with bed locked in lowest position for safety. NAD noted at this time. Respirations even and unlabored and visible chest rise noted. Pt instructed to call if assistance is needed. Pt on continuous BP and O2 monitoring. Call light within reach. Family at bedside. No needs at this time. Will continue to monitor.

## 2019-10-15 NOTE — ED NOTES
Pt family (wife, daughter) updated on procedure time 715am and need for coming to the hospital closer to 5am to ask questions for surgery time prior to consent. Wife cell phone :[phone]
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
## 2019-10-15 NOTE — SUBJECTIVE & OBJECTIVE
Past Medical History:   Diagnosis Date    ANNA (acute kidney injury) 2/22/2018    Anemia     Anxiety     BPH (benign prostatic hyperplasia)     Coarse tremors     Coronary artery disease of native artery of native heart with stable angina pectoris 5/21/2018    Diverticulosis     Encounter for blood transfusion     Endocarditis     Mechanical heart valve present     Non Hodgkin's lymphoma 8/2/2018    Urinary retention        Past Surgical History:   Procedure Laterality Date    APPENDECTOMY      COLONOSCOPY      COLONOSCOPY N/A 2/1/2018    Procedure: COLONOSCOPY;  Surgeon: Richar Payan MD;  Location: [facility] ENDO (4TH FLR);  Service: Endoscopy;  Laterality: N/A;  PM prep    CORONARY ARTERY BYPASS GRAFT (CABG) N/A 5/25/2018    Procedure: AORTOCORONARY BYPASS-CABG;  Surgeon: Marvin Go MD;  Location: [facility] OR 2ND FLR;  Service: Cardiovascular;  Laterality: N/A;    EYE SURGERY Right     cataract extraction    FINGER SURGERY      FRACTURE SURGERY Right     index finger    MITRAL VALVE REPLACEMENT N/A 5/25/2018    Procedure: Mitral Valve Replacement;  Surgeon: Marvin Go MD;  [facility];  Service: Cardiovascular;  Laterality: N/A;    TONSILLECTOMY         Review of patient's allergies indicates:   Allergen Reactions    Metoprolol Diarrhea    Shellfish containing products     Augmentin [amoxicillin-pot clavulanate]      Patient felt that it raised BP and requested to have it added as intolerance. Tolerated unasyn and ampicillin.    Ciprofloxacin     Flagyl [metronidazole]     Lisinopril Other (See Comments)     cough    Mysoline [primidone]     Omnicef [cefdinir]        No current facility-administered medications on file prior to encounter.      Current Outpatient Medications on File Prior to Encounter   Medication Sig    amLODIPine (NORVASC) 5 MG tablet Take 1 tablet (5 mg total) by mouth once daily.    ascorbic acid, vitamin C, (VITAMIN C) 1000 MG tablet Take 1,000  mg by mouth every other day.    aspirin 81 MG Chew 4 tablets (324 mg total) by Per G Tube route once daily.    atorvastatin (LIPITOR) 40 MG tablet GIVE 1 TABLET VIA G TUBE DAILY    gabapentin (NEURONTIN) 100 MG capsule Take 100 mg by mouth every evening.    magnesium 250 mg Tab Take by mouth once daily.    melatonin 10 mg Tab Take by mouth.    mirtazapine (REMERON) 30 MG tablet TAKE 1 TABLET EVERY EVENING    multivit-min/FA/lycopen/lutein (ADULTS 50 PLUS ORAL) Take by mouth every other day.     pantoprazole (PROTONIX) 40 MG tablet TAKE 1 TABLET EVERY DAY    SAW PALMETTO ORAL Take 450 mg by mouth once daily.    tamsulosin (FLOMAX) 0.4 mg Cap Take 2 capsules (0.8 mg total) by mouth once daily.    fluticasone propionate (FLONASE) 50 mcg/actuation nasal spray USE 1 SPRAY IN EACH NOSTRIL EVERY DAY    lactose-reduced food (ENSURE HIGH PROTEIN-MUSCLE ORAL) Take by mouth once daily.     loratadine (CLARITIN) 10 mg tablet Take 10 mg by mouth once daily.     Family History     Problem Relation (Age of Onset)    Heart disease Mother, Father    No Known Problems Sister, Brother    Stroke Mother        Tobacco Use    Smoking status: Never Smoker    Smokeless tobacco: Never Used   Substance and Sexual Activity    Alcohol use: Yes     Comment: social    Drug use: No    Sexual activity: Not Currently     Partners: Female     Review of Systems   Constitution: Negative. Negative for chills and fever.   HENT: Negative.         Neck pain   Eyes: Negative.    Cardiovascular: Negative for chest pain, claudication and paroxysmal nocturnal dyspnea.   Respiratory: Negative for cough, shortness of breath and wheezing.    Endocrine: Negative.    Hematologic/Lymphatic: Does not bruise/bleed easily.   Skin: Negative for nail changes and rash.   Musculoskeletal: Negative.  Negative for back pain.   Gastrointestinal: Negative for abdominal pain, melena, nausea and vomiting.   Neurological: Negative for dizziness and headaches.    Psychiatric/Behavioral: Negative for altered mental status, depression and substance abuse.   Allergic/Immunologic: Negative.      Objective:     Vital Signs (Most Recent):  Temp: 98.6 °F (37 °C) (10/14/19 1659)  Pulse: 82 (10/14/19 2255)  Resp: 16 (10/14/19 2205)  BP: (!) 175/80 (10/14/19 2255)  SpO2: 97 % (10/14/19 2255) Vital Signs (24h Range):  Temp:  [98.6 °F (37 °C)] 98.6 °F (37 °C)  Pulse:  [75-99] 82  Resp:  [14-16] 16  SpO2:  [94 %-100 %] 97 %  BP: (131-187)/(70-88) 175/80     Weight: 76.2 kg (168 lb)  Body mass index is 24.11 kg/m².    SpO2: 97 %  O2 Device (Oxygen Therapy): room air    No intake or output data in the 24 hours ending 10/15/19 0158    Lines/Drains/Airways     Drain                 Gastrostomy/Enterostomy LUQ -- days         Gastrostomy/Enterostomy 06/07/18 1657 Gastrostomy-jejunostomy  days         Gastrostomy/Enterostomy 06/29/18 2200 Gastrostomy-jejunostomy 472 days          Peripheral Intravenous Line                 Peripheral IV - Single Lumen 10/14/19 1111 20 G Left Hand less than 1 day         Peripheral IV - Single Lumen 10/14/19 1755 18 G Right Antecubital less than 1 day                Physical Exam   Constitutional: He is oriented to person, place, and time. He appears well-developed and well-nourished.   HENT:   Head: Normocephalic and atraumatic.   Eyes: Pupils are equal, round, and reactive to light. Conjunctivae and EOM are normal.   Neck:   Unable to assess JVD neck collar in place   Cardiovascular: Normal rate, regular rhythm, normal heart sounds and intact distal pulses. Exam reveals no gallop and no friction rub.   No murmur heard.  Pulmonary/Chest: Effort normal. No stridor. He has no wheezes. He has no rales. He exhibits no tenderness.   Exam limited to anterior chest given Cspine collar   Abdominal: Soft. Bowel sounds are normal. He exhibits no distension and no mass. There is no tenderness. There is no guarding.   Musculoskeletal: He exhibits no edema,  tenderness or deformity.   Neurological: He is alert and oriented to person, place, and time.   Skin: Skin is warm and dry. No rash noted. No erythema.   Psychiatric: He has a normal mood and affect.       Significant Labs:   CMP   Recent Labs   Lab 10/14/19  1302 10/14/19  1755   NA  --  146*   K  --  4.7   CL  --  113*   CO2  --  25   GLU  --  102   BUN  --  23   CREATININE 1.5* 1.4   CALCIUM  --  9.5   PROT  --  6.4   ALBUMIN  --  3.8   BILITOT  --  1.0   ALKPHOS  --  66   AST  --  19   ALT  --  12   ANIONGAP  --  8   ESTGFRAFRICA 54* 58.8*   EGFRNONAA 47* 50.9*    and CBC   Recent Labs   Lab 10/14/19  1755   WBC 5.94   HGB 10.7*   HCT 33.7*   *       Significant Imaging:     TTE 7/18/2019:  CONCLUSIONS     1 - Low normal to mildly depressed left ventricular systolic function (EF 50-55%).     2 - Low normal right ventricular systolic function .     3 - The estimated PA systolic pressure is 30 mmHg.     4 - Mild aortic regurgitation.     5 - Mitral valve prosthesis. Pk/mn gradient = 18/7 mmHg that appears unchanged from the prior study, No obvious MR noted.     6 - Mild to moderate tricuspid regurgitation.     7 - Biatrial enlargement.     8 - No wall motion abnormalities.
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
## 2019-10-15 NOTE — ED NOTES
Pt C-collar replaced by nursing staff with Blanket collar per neurosurgery order, Chi Zepeda MD. Cspine held and maintained by Leo HERNANDEZ RN. Collar removed, switched, and fastened by Karla ACUNA RN and Denice JEAN. Pt tolerated well, stable, NAD. Pt requesting medications for pain, 7/10 on pain scale. PRN meds available. Pt also requesting evening dose of routine meds like melatonin, mirtazapine, and statin. Will address with admit team.
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
## 2019-10-15 NOTE — NURSING
Awake and alert with family at bedside, c/o mild headache but otherwise no issues. Awaiting OR for further instructions.

## 2019-10-15 NOTE — SUBJECTIVE & OBJECTIVE
Interval History: No acute events overnight. Has no new or worsening symptoms. Is awaiting surgery.     Review of Systems   Constitution: Negative for chills and fever.   Eyes: Negative for blurred vision and double vision.   Cardiovascular: Negative for chest pain, claudication, irregular heartbeat and palpitations.   Respiratory: Negative for cough and shortness of breath.    Musculoskeletal: Positive for neck pain and stiffness. Negative for back pain.   Gastrointestinal: Negative for abdominal pain, dysphagia, nausea and vomiting.   Neurological: Negative for dizziness, focal weakness and weakness.   Psychiatric/Behavioral: Negative for altered mental status and memory loss.     Objective:     Vital Signs (Most Recent):  Temp: 98.1 °F (36.7 °C) (10/15/19 0815)  Pulse: 64 (10/15/19 1045)  Resp: 16 (10/15/19 1045)  BP: (!) 168/72 (10/15/19 1045)  SpO2: 95 % (10/15/19 1045) Vital Signs (24h Range):  Temp:  [98.1 °F (36.7 °C)-98.6 °F (37 °C)] 98.1 °F (36.7 °C)  Pulse:  [62-82] 64  Resp:  [16] 16  SpO2:  [94 %-98 %] 95 %  BP: (138-187)/(67-84) 168/72     Weight: 76.2 kg (168 lb)  Body mass index is 24.11 kg/m².     SpO2: 95 %  O2 Device (Oxygen Therapy): room air      Intake/Output Summary (Last 24 hours) at 10/15/2019 1259  Last data filed at 10/15/2019 0900  Gross per 24 hour   Intake --   Output 1100 ml   Net -1100 ml       Lines/Drains/Airways     Drain                 Gastrostomy/Enterostomy LUQ -- days         Gastrostomy/Enterostomy 06/07/18 1657 Gastrostomy-jejunostomy  days         Gastrostomy/Enterostomy 06/29/18 2200 Gastrostomy-jejunostomy 472 days          Peripheral Intravenous Line                 Peripheral IV - Single Lumen 10/14/19 1111 20 G Left Hand 1 day         Peripheral IV - Single Lumen 10/14/19 1755 18 G Right Antecubital less than 1 day                Physical Exam   Constitutional: He is oriented to person, place, and time. He appears well-developed and well-nourished.   HENT:    Head: Normocephalic.   Cervical collar in place   Neck:   ROM and JVD assessment limited by cervical collar   Cardiovascular: Normal rate, regular rhythm, normal heart sounds and intact distal pulses.   No murmur heard.  Pulmonary/Chest: Effort normal and breath sounds normal. No respiratory distress.   Abdominal: Soft. He exhibits no distension. There is no tenderness.   Musculoskeletal: He exhibits no edema.   Neurological: He is alert and oriented to person, place, and time.   Skin: He is not diaphoretic.       Significant Labs:   Blood Culture: No results for input(s): LABBLOO in the last 48 hours., BMP:   Recent Labs   Lab 10/14/19  1302 10/14/19  1755 10/15/19  0337   GLU  --  102 92   NA  --  146* 144   K  --  4.7 4.3   CL  --  113* 114*   CO2  --  25 22*   BUN  --  23 22   CREATININE 1.5* 1.4 1.3   CALCIUM  --  9.5 8.8   , CMP   Recent Labs   Lab 10/14/19  1302 10/14/19  1755 10/15/19  0337   NA  --  146* 144   K  --  4.7 4.3   CL  --  113* 114*   CO2  --  25 22*   GLU  --  102 92   BUN  --  23 22   CREATININE 1.5* 1.4 1.3   CALCIUM  --  9.5 8.8   PROT  --  6.4  --    ALBUMIN  --  3.8  --    BILITOT  --  1.0  --    ALKPHOS  --  66  --    AST  --  19  --    ALT  --  12  --    ANIONGAP  --  8 8   ESTGFRAFRICA 54* 58.8* >60.0   EGFRNONAA 47* 50.9* 55.7*   , CBC   Recent Labs   Lab 10/14/19  1755 10/15/19  0337   WBC 5.94 5.22   HGB 10.7* 9.6*   HCT 33.7* 30.2*   * 107*   , INR   Recent Labs   Lab 10/14/19  1755   INR 1.0   , Troponin No results for input(s): TROPONINI in the last 48 hours., All pertinent lab results from the last 24 hours have been reviewed. and   Recent Lab Results       10/15/19  0337   10/14/19  1755        Albumin   3.8     Alkaline Phosphatase   66     ALT   12     Anion Gap 8 8     AST   19     Baso # 0.02 0.01     Basophil% 0.4 0.2     BILIRUBIN TOTAL   1.0  Comment:  For infants and newborns, interpretation of results should be based  on gestational age, weight and in agreement  with clinical  observations.  Premature Infant recommended reference ranges:  Up to 24 hours.............<8.0 mg/dL  Up to 48 hours............<12.0 mg/dL  3-5 days..................<15.0 mg/dL  6-29 days.................<15.0 mg/dL       BUN, Bld 22 23     Calcium 8.8 9.5     Chloride 114 113     CO2 22 25     Creatinine 1.3 1.4     Differential Method Automated Automated     eGFR if  >60.0 58.8     eGFR if non  55.7  Comment:  Calculation used to obtain the estimated glomerular filtration  rate (eGFR) is the CKD-EPI equation.    50.9  Comment:  Calculation used to obtain the estimated glomerular filtration  rate (eGFR) is the CKD-EPI equation.        Eos # 0.1 0.0     Eosinophil% 2.1 0.5     Glucose 92 102     Gran # (ANC) 3.5 4.6     Gran% 67.4 77.6     Group & Rh   A POS     Hematocrit 30.2 33.7     Hemoglobin 9.6 10.7     Immature Grans (Abs) 0.01  Comment:  Mild elevation in immature granulocytes is non specific and   can be seen in a variety of conditions including stress response,   acute inflammation, trauma and pregnancy. Correlation with other   laboratory and clinical findings is essential.   0.02  Comment:  Mild elevation in immature granulocytes is non specific and   can be seen in a variety of conditions including stress response,   acute inflammation, trauma and pregnancy. Correlation with other   laboratory and clinical findings is essential.       Immature Granulocytes 0.2 0.3     INDIRECT DALJIT   NEG     Coumadin Monitoring INR   1.0  Comment:  Coumadin Therapy:  2.0 - 3.0 for INR for all indicators except mechanical heart valves  and antiphospholipid syndromes which should use 2.5 - 3.5.       Lymph # 1.1 0.9     Lymph% 20.1 14.5     MCH 32.5 32.4     MCHC 31.8 31.8      102     Mono # 0.5 0.4     Mono% 9.8 6.9     MPV 10.9 10.9     nRBC 0 0     Platelets 107 120     Potassium 4.3 4.7     PROTEIN TOTAL   6.4     Protime   10.6     RBC 2.95 3.30     RDW 13.0  13.0     Sodium 144 146     WBC 5.22 5.94           Significant Imaging: All pertinent imaging reviewed from last 24 hours

## 2019-10-15 NOTE — ASSESSMENT & PLAN NOTE
CT/MRI of cervical spine with anterior inferior avulsion fracture of the C6 vertebral body    Recommendations  - per primary team, ortho  - keep aspen collar in place for c-spine stabilization  - OR tomorrow 10/16 for C6-7 ACDF  - pain control: norco q6h PRN

## 2019-10-15 NOTE — ASSESSMENT & PLAN NOTE
TTE 7/18/2019:    1 - Low normal to mildly depressed left ventricular systolic function (EF 50-55%).     2 - Low normal right ventricular systolic function .     3 - The estimated PA systolic pressure is 30 mmHg.     4 - Mild aortic regurgitation.     5 - Mitral valve prosthesis. Pk/mn gradient = 18/7 mmHg that appears unchanged from the prior study, No obvious MR noted.     6 - Mild to moderate tricuspid regurgitation.     7 - Biatrial enlargement.     8 - No wall motion abnormalities.

## 2019-10-15 NOTE — SUBJECTIVE & OBJECTIVE
(Not in a hospital admission)    Review of patient's allergies indicates:   Allergen Reactions    Metoprolol Diarrhea    Shellfish containing products     Augmentin [amoxicillin-pot clavulanate]      Patient felt that it raised BP and requested to have it added as intolerance. Tolerated unasyn and ampicillin.    Ciprofloxacin     Flagyl [metronidazole]     Lisinopril Other (See Comments)     cough    Mysoline [primidone]     Omnicef [cefdinir]        Past Medical History:   Diagnosis Date    ANNA (acute kidney injury) 2/22/2018    Anemia     Anxiety     BPH (benign prostatic hyperplasia)     Coarse tremors     Coronary artery disease of native artery of native heart with stable angina pectoris 5/21/2018    Diverticulosis     Encounter for blood transfusion     Endocarditis     Mechanical heart valve present     Non Hodgkin's lymphoma 8/2/2018    Urinary retention      Past Surgical History:   Procedure Laterality Date    APPENDECTOMY      COLONOSCOPY      COLONOSCOPY N/A 2/1/2018    Procedure: COLONOSCOPY;  Surgeon: Richar Payan MD;  [facility]  Service: Endoscopy;  Laterality: N/A;  PM prep    CORONARY ARTERY BYPASS GRAFT (CABG) N/A 5/25/2018    Procedure: AORTOCORONARY BYPASS-CABG;  Surgeon: Marvin Go MD;  Location: [facility] OR [address] [place] [address];  Service: Cardiovascular;  Laterality: N/A;    EYE SURGERY Right     cataract extraction    FINGER SURGERY      FRACTURE SURGERY Right     index finger    MITRAL VALVE REPLACEMENT N/A 5/25/2018    Procedure: Mitral Valve Replacement;  Surgeon: Marvin Go MD;  Location: [facility] OR [address] [place] [address];  Service: Cardiovascular;  Laterality: N/A;    TONSILLECTOMY       Family History     Problem Relation (Age of Onset)    Heart disease Mother, Father    No Known Problems Sister, Brother    Stroke Mother        Tobacco Use    Smoking status: Never Smoker    Smokeless tobacco: Never Used   Substance and Sexual Activity    Alcohol use: Yes      Comment: social    Drug use: No    Sexual activity: Not Currently     Partners: Female     Review of Systems   Constitutional: Negative for activity change, chills, diaphoresis, fatigue and fever.   HENT: Negative for tinnitus, trouble swallowing and voice change.    Eyes: Negative for photophobia and visual disturbance.   Respiratory: Negative for cough, choking, chest tightness, shortness of breath, wheezing and stridor.    Cardiovascular: Negative for chest pain, palpitations and leg swelling.   Gastrointestinal: Negative for abdominal distention, abdominal pain, constipation, diarrhea, nausea and vomiting.   Endocrine: Negative for polydipsia, polyphagia and polyuria.   Genitourinary: Negative for dysuria, frequency, hematuria and urgency.   Musculoskeletal: Positive for neck pain and neck stiffness. Negative for back pain and gait problem.   Skin: Negative for color change, pallor, rash and wound.   Neurological: Negative for dizziness, tremors, seizures, syncope, facial asymmetry, speech difficulty, weakness, light-headedness, numbness and headaches.   Hematological: Negative for adenopathy. Does not bruise/bleed easily.   Psychiatric/Behavioral: Negative for agitation, behavioral problems and confusion.     Objective:     Weight: 76.2 kg (168 lb)  Body mass index is 24.11 kg/m².  Vital Signs (Most Recent):  Temp: 98.6 °F (37 °C) (10/14/19 1659)  Pulse: 80 (10/14/19 1956)  Resp: 16 (10/14/19 1653)  BP: (!) 173/70 (10/14/19 1956)  SpO2: 95 % (10/14/19 1956) Vital Signs (24h Range):  Temp:  [98.6 °F (37 °C)] 98.6 °F (37 °C)  Pulse:  [75-99] 80  Resp:  [14-16] 16  SpO2:  [95 %-100 %] 95 %  BP: (131-187)/(70-88) 173/70                          Gastrostomy/Enterostomy 06/07/18 1657 Gastrostomy-jejunostomy LUQ (Active)            Gastrostomy/Enterostomy LUQ (Active)            Gastrostomy/Enterostomy 06/29/18 2200 Gastrostomy-jejunostomy (Active)       Neurosurgery Physical Exam  General: AOx3, GCS  [id number]  CNII-XII: Intact on fine exam, PERRL, visual fields grossly intact, EOMi, facial sensation preserved, no facial assymetry, tongue/uvula/palate midline, shoulder shrug equal, no pronator drift  Extremities: 5/5 motor throughout, sensorium intact throughout, coordination intact throughout, DTRs 2+, no pathological reflexes, no sensory level present    Cervical brace in place, TTP in midline and paraspinal tenderness at inferior aspect of cervical vertebra    Significant Labs:  Recent Labs   Lab 10/14/19  1302 10/14/19  1755   GLU  --  102   NA  --  146*   K  --  4.7   CL  --  113*   CO2  --  25   BUN  --  23   CREATININE 1.5* 1.4   CALCIUM  --  9.5     Recent Labs   Lab 10/14/19  1755   WBC 5.94   HGB 10.7*   HCT 33.7*   *     Recent Labs   Lab 10/14/19  1755   INR 1.0     Microbiology Results (last 7 days)     ** No results found for the last 168 hours. **        ABGs: No results for input(s): PH, PCO2, PO2, HCO3, POCSATURATED, BE in the last 48 hours.  Cardiac markers: No results for input(s): CKMB, CPKMB, TROPONINT, TROPONINI, MYOGLOBIN in the last 48 hours.  CMP:   Recent Labs   Lab 10/14/19  1302 10/14/19  1755   GLU  --  102   CALCIUM  --  9.5   ALBUMIN  --  3.8   PROT  --  6.4   NA  --  146*   K  --  4.7   CO2  --  25   CL  --  113*   BUN  --  23   CREATININE 1.5* 1.4   ALKPHOS  --  66   ALT  --  12   AST  --  19   BILITOT  --  1.0     CRP: No results for input(s): CRP in the last 48 hours.  ESR: No results for input(s): POCESR, ERYTHROCYTES in the last 48 hours.  LFTs:   Recent Labs   Lab 10/14/19  1755   ALT 12   AST 19   ALKPHOS 66   BILITOT 1.0   PROT 6.4   ALBUMIN 3.8     Procalcitonin: No results for input(s): PROCAL in the last 48 hours.    Significant Diagnostics:  I have reviewed all pertinent imaging results/findings within the past 24 hours.
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
## 2019-10-15 NOTE — ED NOTES
Patient on stretcher, Bed placed in low/locked position, side rails up x 2, call light is within reach of patient or family, Patient placed into position of comfort. Patient in NAD and offers no complaints at this time. Will continue to monitor.

## 2019-10-15 NOTE — ASSESSMENT & PLAN NOTE
"Performed by cardiology - "His estimated risk for an adverse cardiac outcome (myocardial infarction, cardiac arrest, or death) at 30 days with the proposed surgery is 10.0% (95% CI 8.2%-12.6%) (Revised Cardiac Risk Index [RCRI] Score = >=3 - CCS Criteria - Can J Cardiol 2017; 33:17-32) based on the following risk factors: History of ischemic heart disease and History of congestive heart failure."    - Extensive pre-op cardiac evaluation is not required given able to tolerate >4 METS routinely.   "

## 2019-10-16 LAB
ANION GAP SERPL CALC-SCNC: 4 MMOL/L (ref 8–16)
BASOPHILS # BLD AUTO: 0.02 K/UL (ref 0–0.2)
BASOPHILS NFR BLD: 0.4 % (ref 0–1.9)
BUN SERPL-MCNC: 22 MG/DL (ref 8–23)
CALCIUM SERPL-MCNC: 8.6 MG/DL (ref 8.7–10.5)
CHLORIDE SERPL-SCNC: 114 MMOL/L (ref 95–110)
CO2 SERPL-SCNC: 24 MMOL/L (ref 23–29)
CREAT SERPL-MCNC: 1.2 MG/DL (ref 0.5–1.4)
DIFFERENTIAL METHOD: ABNORMAL
EOSINOPHIL # BLD AUTO: 0.2 K/UL (ref 0–0.5)
EOSINOPHIL NFR BLD: 4.2 % (ref 0–8)
ERYTHROCYTE [DISTWIDTH] IN BLOOD BY AUTOMATED COUNT: 12.9 % (ref 11.5–14.5)
EST. GFR  (AFRICAN AMERICAN): >60 ML/MIN/1.73 M^2
EST. GFR  (NON AFRICAN AMERICAN): >60 ML/MIN/1.73 M^2
GLUCOSE SERPL-MCNC: 90 MG/DL (ref 70–110)
HCT VFR BLD AUTO: 31.3 % (ref 40–54)
HGB BLD-MCNC: 10 G/DL (ref 14–18)
IMM GRANULOCYTES # BLD AUTO: 0.01 K/UL (ref 0–0.04)
IMM GRANULOCYTES NFR BLD AUTO: 0.2 % (ref 0–0.5)
LYMPHOCYTES # BLD AUTO: 1.1 K/UL (ref 1–4.8)
LYMPHOCYTES NFR BLD: 22 % (ref 18–48)
MCH RBC QN AUTO: 32.6 PG (ref 27–31)
MCHC RBC AUTO-ENTMCNC: 31.9 G/DL (ref 32–36)
MCV RBC AUTO: 102 FL (ref 82–98)
MONOCYTES # BLD AUTO: 0.4 K/UL (ref 0.3–1)
MONOCYTES NFR BLD: 8.6 % (ref 4–15)
NEUTROPHILS # BLD AUTO: 3.1 K/UL (ref 1.8–7.7)
NEUTROPHILS NFR BLD: 64.6 % (ref 38–73)
NRBC BLD-RTO: 0 /100 WBC
PLATELET # BLD AUTO: 104 K/UL (ref 150–350)
PMV BLD AUTO: 11.2 FL (ref 9.2–12.9)
POTASSIUM SERPL-SCNC: 4.5 MMOL/L (ref 3.5–5.1)
RBC # BLD AUTO: 3.07 M/UL (ref 4.6–6.2)
SODIUM SERPL-SCNC: 142 MMOL/L (ref 136–145)
WBC # BLD AUTO: 4.78 K/UL (ref 3.9–12.7)

## 2019-10-16 PROCEDURE — 20936 SP BONE AGRFT LOCAL ADD-ON: CPT | Mod: HCNC,,, | Performed by: NEUROLOGICAL SURGERY

## 2019-10-16 PROCEDURE — 36000711: Mod: HCNC | Performed by: NEUROLOGICAL SURGERY

## 2019-10-16 PROCEDURE — 63600175 PHARM REV CODE 636 W HCPCS: Mod: HCNC | Performed by: PHYSICIAN ASSISTANT

## 2019-10-16 PROCEDURE — 27201423 OPTIME MED/SURG SUP & DEVICES STERILE SUPPLY: Mod: HCNC | Performed by: NEUROLOGICAL SURGERY

## 2019-10-16 PROCEDURE — 20930 PR ALLOGRAFT FOR SPINE SURGERY ONLY MORSELIZED: ICD-10-PCS | Mod: HCNC,,, | Performed by: NEUROLOGICAL SURGERY

## 2019-10-16 PROCEDURE — 22853 PR INSERT BIOMECH DEV W/INTERBODY ARTHRODESIS, EA CONTIGUOUS DEFECT: ICD-10-PCS | Mod: 82,HCNC,, | Performed by: NEUROLOGICAL SURGERY

## 2019-10-16 PROCEDURE — 85025 COMPLETE CBC W/AUTO DIFF WBC: CPT | Mod: HCNC

## 2019-10-16 PROCEDURE — 63600175 PHARM REV CODE 636 W HCPCS: Mod: HCNC | Performed by: NURSE ANESTHETIST, CERTIFIED REGISTERED

## 2019-10-16 PROCEDURE — 25000003 PHARM REV CODE 250: Mod: HCNC | Performed by: STUDENT IN AN ORGANIZED HEALTH CARE EDUCATION/TRAINING PROGRAM

## 2019-10-16 PROCEDURE — 37000009 HC ANESTHESIA EA ADD 15 MINS: Mod: HCNC | Performed by: NEUROLOGICAL SURGERY

## 2019-10-16 PROCEDURE — 20936 PR AUTOGRAFT SPINE SURGERY LOCAL FROM SAME INCISION: ICD-10-PCS | Mod: HCNC,,, | Performed by: NEUROLOGICAL SURGERY

## 2019-10-16 PROCEDURE — 22551 ARTHRD ANT NTRBDY CERVICAL: CPT | Mod: HCNC,62,22, | Performed by: NEUROLOGICAL SURGERY

## 2019-10-16 PROCEDURE — 63600175 PHARM REV CODE 636 W HCPCS: Mod: HCNC | Performed by: STUDENT IN AN ORGANIZED HEALTH CARE EDUCATION/TRAINING PROGRAM

## 2019-10-16 PROCEDURE — 36415 COLL VENOUS BLD VENIPUNCTURE: CPT | Mod: HCNC

## 2019-10-16 PROCEDURE — 99233 SBSQ HOSP IP/OBS HIGH 50: CPT | Mod: HCNC,,, | Performed by: HOSPITALIST

## 2019-10-16 PROCEDURE — 22845 INSERT SPINE FIXATION DEVICE: CPT | Mod: 59,HCNC,, | Performed by: NEUROLOGICAL SURGERY

## 2019-10-16 PROCEDURE — D9220A PRA ANESTHESIA: Mod: HCNC,ANES,, | Performed by: ANESTHESIOLOGY

## 2019-10-16 PROCEDURE — 80048 BASIC METABOLIC PNL TOTAL CA: CPT | Mod: HCNC

## 2019-10-16 PROCEDURE — 20600001 HC STEP DOWN PRIVATE ROOM: Mod: HCNC

## 2019-10-16 PROCEDURE — 63600175 PHARM REV CODE 636 W HCPCS: Mod: HCNC | Performed by: ANESTHESIOLOGY

## 2019-10-16 PROCEDURE — 22551 PR ARTHRODESIS ANT INTERBODY INC DISCECTOMY, CERVICAL BELOW C2: ICD-10-PCS | Mod: HCNC,62,22, | Performed by: NEUROLOGICAL SURGERY

## 2019-10-16 PROCEDURE — 22551 PR ARTHRODESIS ANT INTERBODY INC DISCECTOMY, CERVICAL BELOW C2: ICD-10-PCS | Mod: 62,22,HCNC, | Performed by: NEUROLOGICAL SURGERY

## 2019-10-16 PROCEDURE — 20930 SP BONE ALGRFT MORSEL ADD-ON: CPT | Mod: HCNC,,, | Performed by: NEUROLOGICAL SURGERY

## 2019-10-16 PROCEDURE — C1729 CATH, DRAINAGE: HCPCS | Mod: HCNC | Performed by: NEUROLOGICAL SURGERY

## 2019-10-16 PROCEDURE — 94761 N-INVAS EAR/PLS OXIMETRY MLT: CPT | Mod: HCNC

## 2019-10-16 PROCEDURE — 22853 INSJ BIOMECHANICAL DEVICE: CPT | Mod: HCNC,,, | Performed by: NEUROLOGICAL SURGERY

## 2019-10-16 PROCEDURE — 22845 INSERT SPINE FIXATION DEVICE: CPT | Mod: 59,82,HCNC, | Performed by: NEUROLOGICAL SURGERY

## 2019-10-16 PROCEDURE — 27800903 OPTIME MED/SURG SUP & DEVICES OTHER IMPLANTS: Mod: HCNC | Performed by: NEUROLOGICAL SURGERY

## 2019-10-16 PROCEDURE — 22853 PR INSERT BIOMECH DEV W/INTERBODY ARTHRODESIS, EA CONTIGUOUS DEFECT: ICD-10-PCS | Mod: HCNC,,, | Performed by: NEUROLOGICAL SURGERY

## 2019-10-16 PROCEDURE — 37000008 HC ANESTHESIA 1ST 15 MINUTES: Mod: HCNC | Performed by: NEUROLOGICAL SURGERY

## 2019-10-16 PROCEDURE — 22551 ARTHRD ANT NTRBDY CERVICAL: CPT | Mod: 62,22,HCNC, | Performed by: NEUROLOGICAL SURGERY

## 2019-10-16 PROCEDURE — 63600175 PHARM REV CODE 636 W HCPCS: Mod: HCNC

## 2019-10-16 PROCEDURE — 99233 PR SUBSEQUENT HOSPITAL CARE,LEVL III: ICD-10-PCS | Mod: HCNC,,, | Performed by: HOSPITALIST

## 2019-10-16 PROCEDURE — 22853 INSJ BIOMECHANICAL DEVICE: CPT | Mod: 82,HCNC,, | Performed by: NEUROLOGICAL SURGERY

## 2019-10-16 PROCEDURE — C1713 ANCHOR/SCREW BN/BN,TIS/BN: HCPCS | Mod: HCNC | Performed by: NEUROLOGICAL SURGERY

## 2019-10-16 PROCEDURE — 22845 PR ANTERIOR INSTRUMENTATION 2-3 VERTEBRAL SEGMENTS: ICD-10-PCS | Mod: 59,HCNC,, | Performed by: NEUROLOGICAL SURGERY

## 2019-10-16 PROCEDURE — 71000033 HC RECOVERY, INTIAL HOUR: Mod: HCNC | Performed by: NEUROLOGICAL SURGERY

## 2019-10-16 PROCEDURE — 36000710: Mod: HCNC | Performed by: NEUROLOGICAL SURGERY

## 2019-10-16 PROCEDURE — 25000003 PHARM REV CODE 250: Mod: HCNC | Performed by: NURSE ANESTHETIST, CERTIFIED REGISTERED

## 2019-10-16 PROCEDURE — 22845 PR ANTERIOR INSTRUMENTATION 2-3 VERTEBRAL SEGMENTS: ICD-10-PCS | Mod: 59,82,HCNC, | Performed by: NEUROLOGICAL SURGERY

## 2019-10-16 PROCEDURE — 63600175 PHARM REV CODE 636 W HCPCS: Mod: HCNC | Performed by: NEUROLOGICAL SURGERY

## 2019-10-16 PROCEDURE — D9220A PRA ANESTHESIA: ICD-10-PCS | Mod: HCNC,ANES,, | Performed by: ANESTHESIOLOGY

## 2019-10-16 PROCEDURE — 25000003 PHARM REV CODE 250: Mod: HCNC | Performed by: NEUROLOGICAL SURGERY

## 2019-10-16 DEVICE — ACTIFUSE ABX PUTTY 2.5ML: Type: IMPLANTABLE DEVICE | Site: SPINE CERVICAL | Status: FUNCTIONAL

## 2019-10-16 DEVICE — IMPLANTABLE DEVICE: Type: IMPLANTABLE DEVICE | Site: SPINE CERVICAL | Status: FUNCTIONAL

## 2019-10-16 RX ORDER — SUCCINYLCHOLINE CHLORIDE 20 MG/ML
INJECTION INTRAMUSCULAR; INTRAVENOUS
Status: DISCONTINUED | OUTPATIENT
Start: 2019-10-16 | End: 2019-10-16

## 2019-10-16 RX ORDER — FLUTICASONE PROPIONATE 50 MCG
1 SPRAY, SUSPENSION (ML) NASAL DAILY
Status: DISCONTINUED | OUTPATIENT
Start: 2019-10-17 | End: 2019-10-17 | Stop reason: HOSPADM

## 2019-10-16 RX ORDER — AMLODIPINE BESYLATE 5 MG/1
5 TABLET ORAL DAILY
Status: DISCONTINUED | OUTPATIENT
Start: 2019-10-17 | End: 2019-10-17 | Stop reason: HOSPADM

## 2019-10-16 RX ORDER — PROPOFOL 10 MG/ML
VIAL (ML) INTRAVENOUS
Status: DISCONTINUED | OUTPATIENT
Start: 2019-10-16 | End: 2019-10-16

## 2019-10-16 RX ORDER — CETIRIZINE HYDROCHLORIDE 5 MG/1
10 TABLET ORAL DAILY
Status: DISCONTINUED | OUTPATIENT
Start: 2019-10-17 | End: 2019-10-17 | Stop reason: HOSPADM

## 2019-10-16 RX ORDER — DIPHENHYDRAMINE HYDROCHLORIDE 50 MG/ML
25 INJECTION INTRAMUSCULAR; INTRAVENOUS EVERY 6 HOURS PRN
Status: DISCONTINUED | OUTPATIENT
Start: 2019-10-16 | End: 2019-10-17

## 2019-10-16 RX ORDER — EPHEDRINE SULFATE 50 MG/ML
INJECTION, SOLUTION INTRAVENOUS
Status: DISCONTINUED | OUTPATIENT
Start: 2019-10-16 | End: 2019-10-16

## 2019-10-16 RX ORDER — ONDANSETRON 2 MG/ML
4 INJECTION INTRAMUSCULAR; INTRAVENOUS ONCE AS NEEDED
Status: DISCONTINUED | OUTPATIENT
Start: 2019-10-16 | End: 2019-10-17 | Stop reason: HOSPADM

## 2019-10-16 RX ORDER — REMIFENTANIL HYDROCHLORIDE 1 MG/ML
INJECTION, POWDER, LYOPHILIZED, FOR SOLUTION INTRAVENOUS CONTINUOUS PRN
Status: DISCONTINUED | OUTPATIENT
Start: 2019-10-16 | End: 2019-10-16

## 2019-10-16 RX ORDER — MEPERIDINE HYDROCHLORIDE 50 MG/ML
12.5 INJECTION INTRAMUSCULAR; INTRAVENOUS; SUBCUTANEOUS ONCE AS NEEDED
Status: DISCONTINUED | OUTPATIENT
Start: 2019-10-16 | End: 2019-10-17

## 2019-10-16 RX ORDER — FENTANYL CITRATE 50 UG/ML
25 INJECTION, SOLUTION INTRAMUSCULAR; INTRAVENOUS EVERY 5 MIN PRN
Status: DISCONTINUED | OUTPATIENT
Start: 2019-10-16 | End: 2019-10-17

## 2019-10-16 RX ORDER — CEFAZOLIN SODIUM 1 G/3ML
2 INJECTION, POWDER, FOR SOLUTION INTRAMUSCULAR; INTRAVENOUS
Status: CANCELLED | OUTPATIENT
Start: 2019-10-16 | End: 2019-10-17

## 2019-10-16 RX ORDER — FENTANYL CITRATE 50 UG/ML
INJECTION, SOLUTION INTRAMUSCULAR; INTRAVENOUS
Status: DISCONTINUED | OUTPATIENT
Start: 2019-10-16 | End: 2019-10-16

## 2019-10-16 RX ORDER — ONDANSETRON 2 MG/ML
INJECTION INTRAMUSCULAR; INTRAVENOUS
Status: DISCONTINUED | OUTPATIENT
Start: 2019-10-16 | End: 2019-10-16

## 2019-10-16 RX ORDER — LIDOCAINE HYDROCHLORIDE AND EPINEPHRINE 10; 10 MG/ML; UG/ML
INJECTION, SOLUTION INFILTRATION; PERINEURAL
Status: DISCONTINUED | OUTPATIENT
Start: 2019-10-16 | End: 2019-10-16 | Stop reason: HOSPADM

## 2019-10-16 RX ORDER — PROPOFOL 10 MG/ML
VIAL (ML) INTRAVENOUS CONTINUOUS PRN
Status: DISCONTINUED | OUTPATIENT
Start: 2019-10-16 | End: 2019-10-16

## 2019-10-16 RX ORDER — TAMSULOSIN HYDROCHLORIDE 0.4 MG/1
0.8 CAPSULE ORAL DAILY
Status: DISCONTINUED | OUTPATIENT
Start: 2019-10-17 | End: 2019-10-17 | Stop reason: HOSPADM

## 2019-10-16 RX ORDER — BACITRACIN 50000 [IU]/1
INJECTION, POWDER, FOR SOLUTION INTRAMUSCULAR
Status: DISCONTINUED | OUTPATIENT
Start: 2019-10-16 | End: 2019-10-16 | Stop reason: HOSPADM

## 2019-10-16 RX ORDER — ASCORBIC ACID 250 MG
1000 TABLET ORAL EVERY OTHER DAY
Status: DISCONTINUED | OUTPATIENT
Start: 2019-10-17 | End: 2019-10-17 | Stop reason: HOSPADM

## 2019-10-16 RX ORDER — HYDROMORPHONE HYDROCHLORIDE 1 MG/ML
1 INJECTION, SOLUTION INTRAMUSCULAR; INTRAVENOUS; SUBCUTANEOUS
Status: DISCONTINUED | OUTPATIENT
Start: 2019-10-16 | End: 2019-10-17 | Stop reason: HOSPADM

## 2019-10-16 RX ORDER — DEXAMETHASONE SODIUM PHOSPHATE 4 MG/ML
INJECTION, SOLUTION INTRA-ARTICULAR; INTRALESIONAL; INTRAMUSCULAR; INTRAVENOUS; SOFT TISSUE
Status: DISCONTINUED | OUTPATIENT
Start: 2019-10-16 | End: 2019-10-16

## 2019-10-16 RX ORDER — METHYLPREDNISOLONE ACETATE 40 MG/ML
INJECTION, SUSPENSION INTRA-ARTICULAR; INTRALESIONAL; INTRAMUSCULAR; SOFT TISSUE
Status: DISCONTINUED | OUTPATIENT
Start: 2019-10-16 | End: 2019-10-16 | Stop reason: HOSPADM

## 2019-10-16 RX ORDER — HYDROMORPHONE HYDROCHLORIDE 1 MG/ML
0.2 INJECTION, SOLUTION INTRAMUSCULAR; INTRAVENOUS; SUBCUTANEOUS EVERY 5 MIN PRN
Status: DISCONTINUED | OUTPATIENT
Start: 2019-10-16 | End: 2019-10-17

## 2019-10-16 RX ORDER — HYDROMORPHONE HYDROCHLORIDE 1 MG/ML
INJECTION, SOLUTION INTRAMUSCULAR; INTRAVENOUS; SUBCUTANEOUS
Status: COMPLETED
Start: 2019-10-16 | End: 2019-10-16

## 2019-10-16 RX ORDER — LIDOCAINE HCL/PF 100 MG/5ML
SYRINGE (ML) INTRAVENOUS
Status: DISCONTINUED | OUTPATIENT
Start: 2019-10-16 | End: 2019-10-16

## 2019-10-16 RX ADMIN — DEXAMETHASONE SODIUM PHOSPHATE 8 MG: 4 INJECTION, SOLUTION INTRAMUSCULAR; INTRAVENOUS at 07:10

## 2019-10-16 RX ADMIN — HYDROMORPHONE HYDROCHLORIDE 0.2 MG: 1 INJECTION, SOLUTION INTRAMUSCULAR; INTRAVENOUS; SUBCUTANEOUS at 08:10

## 2019-10-16 RX ADMIN — GABAPENTIN 100 MG: 100 CAPSULE ORAL at 08:10

## 2019-10-16 RX ADMIN — SODIUM CHLORIDE, SODIUM GLUCONATE, SODIUM ACETATE, POTASSIUM CHLORIDE, MAGNESIUM CHLORIDE, SODIUM PHOSPHATE, DIBASIC, AND POTASSIUM PHOSPHATE: .53; .5; .37; .037; .03; .012; .00082 INJECTION, SOLUTION INTRAVENOUS at 05:10

## 2019-10-16 RX ADMIN — LIDOCAINE HYDROCHLORIDE 100 MG: 20 INJECTION, SOLUTION INTRAVENOUS at 05:10

## 2019-10-16 RX ADMIN — SODIUM CHLORIDE: 0.9 INJECTION, SOLUTION INTRAVENOUS at 10:10

## 2019-10-16 RX ADMIN — SUCCINYLCHOLINE CHLORIDE 160 MG: 20 INJECTION, SOLUTION INTRAMUSCULAR; INTRAVENOUS at 05:10

## 2019-10-16 RX ADMIN — METHOCARBAMOL TABLETS 500 MG: 500 TABLET, COATED ORAL at 08:10

## 2019-10-16 RX ADMIN — PROPOFOL 100 MCG/KG/MIN: 10 INJECTION, EMULSION INTRAVENOUS at 05:10

## 2019-10-16 RX ADMIN — ATORVASTATIN CALCIUM 40 MG: 20 TABLET, FILM COATED ORAL at 09:10

## 2019-10-16 RX ADMIN — HYDROCODONE BITARTRATE AND ACETAMINOPHEN 1 TABLET: 5; 325 TABLET ORAL at 08:10

## 2019-10-16 RX ADMIN — FENTANYL CITRATE 100 MCG: 50 INJECTION, SOLUTION INTRAMUSCULAR; INTRAVENOUS at 05:10

## 2019-10-16 RX ADMIN — MIRTAZAPINE 30 MG: 15 TABLET, FILM COATED ORAL at 10:10

## 2019-10-16 RX ADMIN — ONDANSETRON 4 MG: 2 INJECTION INTRAMUSCULAR; INTRAVENOUS at 07:10

## 2019-10-16 RX ADMIN — DEXTROSE 2 G: 50 INJECTION, SOLUTION INTRAVENOUS at 06:10

## 2019-10-16 RX ADMIN — VANCOMYCIN HYDROCHLORIDE 1250 MG: 1.25 INJECTION, POWDER, LYOPHILIZED, FOR SOLUTION INTRAVENOUS at 12:10

## 2019-10-16 RX ADMIN — SODIUM CHLORIDE 0.2 MCG/KG/MIN: 9 INJECTION, SOLUTION INTRAVENOUS at 06:10

## 2019-10-16 RX ADMIN — PROPOFOL 150 MG: 10 INJECTION, EMULSION INTRAVENOUS at 05:10

## 2019-10-16 RX ADMIN — Medication 0.1 MCG/KG/MIN: at 05:10

## 2019-10-16 RX ADMIN — METHOCARBAMOL TABLETS 500 MG: 500 TABLET, COATED ORAL at 09:10

## 2019-10-16 RX ADMIN — METHOCARBAMOL TABLETS 500 MG: 500 TABLET, COATED ORAL at 02:10

## 2019-10-16 RX ADMIN — HYDROCODONE BITARTRATE AND ACETAMINOPHEN 1 TABLET: 5; 325 TABLET ORAL at 04:10

## 2019-10-16 RX ADMIN — EPHEDRINE SULFATE 15 MG: 50 INJECTION, SOLUTION INTRAMUSCULAR; INTRAVENOUS; SUBCUTANEOUS at 05:10

## 2019-10-16 NOTE — ASSESSMENT & PLAN NOTE
Hx of HTN managed with home amlodpine 5mg daily. Patient reports this dose has recently been increased to 10mg daily by cardiologist.    Recommendations:  - hold home amlodipine until after surgery

## 2019-10-16 NOTE — PLAN OF CARE
TRENT following for DC needs. TRENT in communication with Agatha DEL RIO    If DC recommendation is Home Health patient wants [facility].     10/16/19 9921   Post-Acute Status   Post-Acute Authorization Home Health/Hospice   Home Health/Hospice Status   (Preference: [facility])     Harika Restrepo LMSW  [facility]  [id number]

## 2019-10-16 NOTE — ASSESSMENT & PLAN NOTE
CT/MRI of cervical spine with anterior inferior avulsion fracture of the C6 vertebral body    Recommendations  - per primary team, ortho  - keep aspen collar in place for c-spine stabilization  - OR today for C6-7 ACDF  - pain control: norco q6h PRN

## 2019-10-16 NOTE — PLAN OF CARE
CM met with patient and spouse this am to obtain discharge planning assessment.  Patient is admitted with a teardrop cervical fracture and is scheduled to go to surgery today.  Planned discharge is home with family with  home health - Plan (A) or home with family - Plan (B).    PCP:  Deric Claudio MD     Payor:  Payor: [employer] MEDICARE / Plan: [employer] MEDICARE HMO / Product Type: Capitation /      Pharmacy:    [facility] - [place], [place] - [address]  [address] [address]  Phone: [phone] Fax: [phone]    [facility] [address] [facility], [place] - [address] [facility]  [address] [facility] [address]  Phone: [phone] Fax: [phone]    [id number] [place] [facility] [address] [address]  Phone: [phone] Fax: [phone]       10/16/19 1238   Discharge Assessment   Assessment Type Discharge Planning Assessment   Confirmed/corrected address and phone number on facesheet? Yes   Assessment information obtained from? Patient   Expected Length of Stay (days) 4   Communicated expected length of stay with patient/caregiver yes   Prior to hospitilization cognitive status: Alert/Oriented   Prior to hospitalization functional status: Independent   Current cognitive status: Alert/Oriented   Current Functional Status: Independent   Lives With spouse   Able to Return to Prior Arrangements yes   Is patient able to care for self after discharge? Yes   Who are your caregiver(s) and their phone number(s)? Lupe - spouse [phone]   Patient's perception of discharge disposition home health   Readmission Within the Last 30 Days no previous admission in last 30 days   Patient currently being followed by outpatient case management? No   Patient currently receives any other outside agency services? No   Equipment Currently Used at Home none   Do you have any problems affording any of your prescribed medications?  No   Is the patient taking medications as prescribed? yes   Does the patient have transportation home? Yes   Transportation Anticipated family or friend will provide   Does the patient receive services at the Coumadin Clinic? No   Discharge Plan A Home with family;Home Health   Discharge Plan B Home with family   DME Needed Upon Discharge  none   Patient/Family in Agreement with Plan yes
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
## 2019-10-16 NOTE — SUBJECTIVE & OBJECTIVE
Interval History: NAEON. Patient scheduled to go to OR today for C6/7 ACDF.    Review of Systems   Constitutional: Negative for chills and fever.   Respiratory: Negative for shortness of breath.    Cardiovascular: Negative for chest pain and leg swelling.   Gastrointestinal: Negative for abdominal pain, nausea and vomiting.   Genitourinary: Negative for difficulty urinating.   Musculoskeletal: Positive for myalgias and neck pain.   Neurological: Negative for dizziness, weakness, numbness and headaches.     Objective:     Vital Signs (Most Recent):  Temp: 98.2 °F (36.8 °C) (10/16/19 1614)  Pulse: 61 (10/16/19 1614)  Resp: 18 (10/16/19 0800)  BP: (!) 167/74 (10/16/19 1614)  SpO2: 97 % (10/16/19 1614) Vital Signs (24h Range):  Temp:  [97 °F (36.1 °C)-98.5 °F (36.9 °C)] 98.2 °F (36.8 °C)  Pulse:  [59-69] 61  Resp:  [17-20] 18  SpO2:  [93 %-98 %] 97 %  BP: (158-179)/(71-81) 167/74     Weight: 76.2 kg (167 lb 15.9 oz)  Body mass index is 24.1 kg/m².  No intake or output data in the 24 hours ending 10/16/19 1625     Physical Exam   Constitutional: He is oriented to person, place, and time. He appears well-developed and well-nourished.   HENT:   Head: Normocephalic and atraumatic.   Eyes: Conjunctivae are normal. No scleral icterus.   Neck:   Aspen collar in place   Cardiovascular: Normal rate and intact distal pulses.   Murmur heard.  Pulmonary/Chest: Effort normal and breath sounds normal. No respiratory distress. He has no wheezes. He has no rales.   Abdominal: Soft. Bowel sounds are normal. He exhibits no distension. There is no tenderness. There is no guarding.   Musculoskeletal: He exhibits no edema, tenderness or deformity.   Neurological: He is alert and oriented to person, place, and time. No sensory deficit.   Skin: Skin is warm and dry. He is not diaphoretic.   Psychiatric: He has a normal mood and affect.   Mildly distressed/anxious    Significant Labs: All pertinent labs within the past 24 hours have been  reviewed.    Significant Imaging: I have reviewed all pertinent imaging results/findings within the past 24 hours.
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
## 2019-10-17 VITALS
DIASTOLIC BLOOD PRESSURE: 82 MMHG | TEMPERATURE: 98 F | HEIGHT: 70 IN | HEART RATE: 78 BPM | BODY MASS INDEX: 24.05 KG/M2 | RESPIRATION RATE: 18 BRPM | WEIGHT: 168 LBS | SYSTOLIC BLOOD PRESSURE: 134 MMHG | OXYGEN SATURATION: 91 %

## 2019-10-17 DIAGNOSIS — S12.9XXA: Primary | ICD-10-CM

## 2019-10-17 LAB
ANION GAP SERPL CALC-SCNC: 12 MMOL/L (ref 8–16)
BASOPHILS # BLD AUTO: 0 K/UL (ref 0–0.2)
BASOPHILS NFR BLD: 0 % (ref 0–1.9)
BUN SERPL-MCNC: 22 MG/DL (ref 8–23)
CALCIUM SERPL-MCNC: 8.6 MG/DL (ref 8.7–10.5)
CHLORIDE SERPL-SCNC: 112 MMOL/L (ref 95–110)
CO2 SERPL-SCNC: 19 MMOL/L (ref 23–29)
CREAT SERPL-MCNC: 1.2 MG/DL (ref 0.5–1.4)
DIFFERENTIAL METHOD: ABNORMAL
EOSINOPHIL # BLD AUTO: 0 K/UL (ref 0–0.5)
EOSINOPHIL NFR BLD: 0.2 % (ref 0–8)
ERYTHROCYTE [DISTWIDTH] IN BLOOD BY AUTOMATED COUNT: 12.4 % (ref 11.5–14.5)
EST. GFR  (AFRICAN AMERICAN): >60 ML/MIN/1.73 M^2
EST. GFR  (NON AFRICAN AMERICAN): >60 ML/MIN/1.73 M^2
GLUCOSE SERPL-MCNC: 119 MG/DL (ref 70–110)
HCT VFR BLD AUTO: 32.6 % (ref 40–54)
HGB BLD-MCNC: 10.5 G/DL (ref 14–18)
IMM GRANULOCYTES # BLD AUTO: 0.03 K/UL (ref 0–0.04)
IMM GRANULOCYTES NFR BLD AUTO: 0.5 % (ref 0–0.5)
LYMPHOCYTES # BLD AUTO: 0.3 K/UL (ref 1–4.8)
LYMPHOCYTES NFR BLD: 5.7 % (ref 18–48)
MCH RBC QN AUTO: 32.3 PG (ref 27–31)
MCHC RBC AUTO-ENTMCNC: 32.2 G/DL (ref 32–36)
MCV RBC AUTO: 100 FL (ref 82–98)
MONOCYTES # BLD AUTO: 0.2 K/UL (ref 0.3–1)
MONOCYTES NFR BLD: 2.7 % (ref 4–15)
NEUTROPHILS # BLD AUTO: 5.3 K/UL (ref 1.8–7.7)
NEUTROPHILS NFR BLD: 90.9 % (ref 38–73)
NRBC BLD-RTO: 0 /100 WBC
PLATELET # BLD AUTO: 99 K/UL (ref 150–350)
PMV BLD AUTO: 11.3 FL (ref 9.2–12.9)
POCT GLUCOSE: 117 MG/DL (ref 70–110)
POTASSIUM SERPL-SCNC: 4.5 MMOL/L (ref 3.5–5.1)
RBC # BLD AUTO: 3.25 M/UL (ref 4.6–6.2)
SODIUM SERPL-SCNC: 143 MMOL/L (ref 136–145)
WBC # BLD AUTO: 5.83 K/UL (ref 3.9–12.7)

## 2019-10-17 PROCEDURE — 99024 POSTOP FOLLOW-UP VISIT: CPT | Mod: HCNC,,, | Performed by: PHYSICIAN ASSISTANT

## 2019-10-17 PROCEDURE — 99232 PR SUBSEQUENT HOSPITAL CARE,LEVL II: ICD-10-PCS | Mod: HCNC,,, | Performed by: HOSPITALIST

## 2019-10-17 PROCEDURE — 25000003 PHARM REV CODE 250: Mod: HCNC | Performed by: PHYSICIAN ASSISTANT

## 2019-10-17 PROCEDURE — 99232 SBSQ HOSP IP/OBS MODERATE 35: CPT | Mod: HCNC,,, | Performed by: HOSPITALIST

## 2019-10-17 PROCEDURE — 99024 PR POST-OP FOLLOW-UP VISIT: ICD-10-PCS | Mod: HCNC,,, | Performed by: PHYSICIAN ASSISTANT

## 2019-10-17 PROCEDURE — 99238 HOSP IP/OBS DSCHRG MGMT 30/<: CPT | Mod: HCNC,,, | Performed by: PHYSICIAN ASSISTANT

## 2019-10-17 PROCEDURE — 25000003 PHARM REV CODE 250: Mod: HCNC | Performed by: STUDENT IN AN ORGANIZED HEALTH CARE EDUCATION/TRAINING PROGRAM

## 2019-10-17 PROCEDURE — 63600175 PHARM REV CODE 636 W HCPCS: Mod: HCNC | Performed by: PHYSICIAN ASSISTANT

## 2019-10-17 PROCEDURE — 36415 COLL VENOUS BLD VENIPUNCTURE: CPT | Mod: HCNC

## 2019-10-17 PROCEDURE — 25000242 PHARM REV CODE 250 ALT 637 W/ HCPCS: Mod: HCNC | Performed by: STUDENT IN AN ORGANIZED HEALTH CARE EDUCATION/TRAINING PROGRAM

## 2019-10-17 PROCEDURE — 80048 BASIC METABOLIC PNL TOTAL CA: CPT | Mod: HCNC

## 2019-10-17 PROCEDURE — 99238 PR HOSPITAL DISCHARGE DAY,<30 MIN: ICD-10-PCS | Mod: HCNC,,, | Performed by: PHYSICIAN ASSISTANT

## 2019-10-17 PROCEDURE — 63600175 PHARM REV CODE 636 W HCPCS: Mod: HCNC | Performed by: STUDENT IN AN ORGANIZED HEALTH CARE EDUCATION/TRAINING PROGRAM

## 2019-10-17 PROCEDURE — 85025 COMPLETE CBC W/AUTO DIFF WBC: CPT | Mod: HCNC

## 2019-10-17 RX ORDER — HYDROCODONE BITARTRATE AND ACETAMINOPHEN 5; 325 MG/1; MG/1
1 TABLET ORAL EVERY 4 HOURS PRN
Status: DISCONTINUED | OUTPATIENT
Start: 2019-10-17 | End: 2019-10-17 | Stop reason: HOSPADM

## 2019-10-17 RX ORDER — METHOCARBAMOL 500 MG/1
500 TABLET, FILM COATED ORAL EVERY 6 HOURS PRN
Qty: 40 TABLET | Refills: 0 | Status: SHIPPED | OUTPATIENT
Start: 2019-10-17 | End: 2019-10-17

## 2019-10-17 RX ORDER — HEPARIN SODIUM 5000 [USP'U]/ML
5000 INJECTION, SOLUTION INTRAVENOUS; SUBCUTANEOUS EVERY 8 HOURS
Status: DISCONTINUED | OUTPATIENT
Start: 2019-10-17 | End: 2019-10-17 | Stop reason: HOSPADM

## 2019-10-17 RX ORDER — HYDROCODONE BITARTRATE AND ACETAMINOPHEN 7.5; 325 MG/1; MG/1
1 TABLET ORAL EVERY 4 HOURS PRN
Qty: 60 TABLET | Refills: 0 | Status: SHIPPED | OUTPATIENT
Start: 2019-10-17 | End: 2020-10-13

## 2019-10-17 RX ORDER — METHOCARBAMOL 500 MG/1
500 TABLET, FILM COATED ORAL EVERY 6 HOURS PRN
Qty: 40 TABLET | Refills: 0 | Status: SHIPPED | OUTPATIENT
Start: 2019-10-17 | End: 2019-11-22 | Stop reason: SDUPTHER

## 2019-10-17 RX ORDER — ACETAMINOPHEN 325 MG/1
325 TABLET ORAL EVERY 6 HOURS PRN
Status: DISCONTINUED | OUTPATIENT
Start: 2019-10-17 | End: 2019-10-17 | Stop reason: HOSPADM

## 2019-10-17 RX ORDER — HYDROCODONE BITARTRATE AND ACETAMINOPHEN 7.5; 325 MG/1; MG/1
1 TABLET ORAL EVERY 4 HOURS PRN
Qty: 60 TABLET | Refills: 0 | Status: SHIPPED | OUTPATIENT
Start: 2019-10-17 | End: 2019-10-17 | Stop reason: SDUPTHER

## 2019-10-17 RX ADMIN — HEPARIN SODIUM 5000 UNITS: 5000 INJECTION, SOLUTION INTRAVENOUS; SUBCUTANEOUS at 01:10

## 2019-10-17 RX ADMIN — CETIRIZINE HYDROCHLORIDE 10 MG: 5 TABLET ORAL at 10:10

## 2019-10-17 RX ADMIN — PANTOPRAZOLE SODIUM 40 MG: 40 TABLET, DELAYED RELEASE ORAL at 10:10

## 2019-10-17 RX ADMIN — HYDROCODONE BITARTRATE AND ACETAMINOPHEN 1 TABLET: 5; 325 TABLET ORAL at 04:10

## 2019-10-17 RX ADMIN — HYDROCODONE BITARTRATE AND ACETAMINOPHEN 1 TABLET: 5; 325 TABLET ORAL at 10:10

## 2019-10-17 RX ADMIN — METHOCARBAMOL TABLETS 500 MG: 500 TABLET, COATED ORAL at 01:10

## 2019-10-17 RX ADMIN — Medication 1000 MG: at 10:10

## 2019-10-17 RX ADMIN — TAMSULOSIN HYDROCHLORIDE 0.8 MG: 0.4 CAPSULE ORAL at 10:10

## 2019-10-17 RX ADMIN — AMLODIPINE BESYLATE 5 MG: 5 TABLET ORAL at 10:10

## 2019-10-17 RX ADMIN — FLUTICASONE PROPIONATE 50 MCG: 50 SPRAY, METERED NASAL at 10:10

## 2019-10-17 RX ADMIN — ATORVASTATIN CALCIUM 40 MG: 20 TABLET, FILM COATED ORAL at 10:10

## 2019-10-17 RX ADMIN — HYDROMORPHONE HYDROCHLORIDE 1 MG: 1 INJECTION, SOLUTION INTRAMUSCULAR; INTRAVENOUS; SUBCUTANEOUS at 05:10

## 2019-10-17 RX ADMIN — METHOCARBAMOL TABLETS 500 MG: 500 TABLET, COATED ORAL at 10:10

## 2019-10-17 NOTE — NURSING TRANSFER
Nursing Transfer Note      10/16/2019     Transfer To: x ray then to [id number]    Transfer via bed    Transfer with aspen collar     Transported by RN x 2    Medicines sent: N/A, RN to restart fluids when in room     Chart send with patient: Yes    Notified: family
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
## 2019-10-17 NOTE — ANESTHESIA POSTPROCEDURE EVALUATION
Anesthesia Post Evaluation    Patient: Rodolfo Messina    Procedure(s) Performed: Procedure(s) (LRB):  DISCECTOMY, SPINE, CERVICAL, ANTERIOR C6/7 ACDF (N/A)    Final Anesthesia Type: general  Patient location during evaluation: PACU  Patient participation: Yes- Able to Participate  Level of consciousness: awake and alert  Post-procedure vital signs: reviewed and stable  Pain management: adequate  Airway patency: patent  PONV status at discharge: No PONV  Anesthetic complications: no      Cardiovascular status: blood pressure returned to baseline and stable  Respiratory status: unassisted  Hydration status: euvolemic  Follow-up not needed.          Vitals Value Taken Time   /82 10/17/2019  8:06 AM   Temp 36.8 °C (98.3 °F) 10/17/2019  8:06 AM   Pulse 78 10/17/2019  8:06 AM   Resp 18 10/17/2019  8:06 AM   SpO2 91 % 10/17/2019  8:06 AM         Event Time     Out of Recovery 10/16/2019 20:30:00          Pain/Siva Score: Pain Rating Prior to Med Admin: 5 (10/17/2019  5:46 AM)  Pain Rating Post Med Admin: 3 (10/17/2019  6:16 AM)  Siva Score: 10 (10/16/2019  9:00 PM)

## 2019-10-17 NOTE — TRANSFER OF CARE
"Anesthesia Transfer of Care Note    Patient: Rodolfo Messina    Procedure(s) Performed: Procedure(s) (LRB):  DISCECTOMY, SPINE, CERVICAL, ANTERIOR C6/7 ACDF (N/A)    Patient location: PACU    Anesthesia Type: general    Transport from OR: Transported from OR on 6-10 L/min O2 by face mask with adequate spontaneous ventilation    Post pain: adequate analgesia    Post assessment: no apparent anesthetic complications and tolerated procedure well    Post vital signs: stable    Level of consciousness: awake and alert    Nausea/Vomiting: no nausea/vomiting    Complications: none    Transfer of care protocol was followed      Last vitals:   Visit Vitals  BP (!) 167/74 (BP Location: Right arm, Patient Position: Lying)   Pulse 61   Temp 36.8 °C (98.2 °F) (Oral)   Resp 18   Ht 5' 10" (1.778 m)   Wt 76.2 kg (167 lb 15.9 oz)   SpO2 99%   BMI 24.10 kg/m²     "

## 2019-10-17 NOTE — DISCHARGE INSTRUCTIONS
Neurosurgery Patient Information. Please follow ONLY the instructions that are checked below.    Activity Restrictions:  [x]  Return to work will be determined on an individual basis.  [x]  No lifting greater than 5-10 pounds.  [x]  Avoid bending and twisting the area of your surgery more than 45 degrees from neutral position in any direction.  [x]  No driving or operating machinery:  [x]  until cleared by your surgeon.  [x]  while taking narcotic pain medications or muscle relaxants.  [x]  Wear your brace at all times except when showering or performing hygiene tasks.   [x]  Increase ambulation over the next 2 weeks so that you are walking 2 miles per day at 2 weeks post-operatively.  [x]  Walk on paved surfaces only. It is okay to walk up and down stairs while holding onto a side rail.  [x]  No sexual activity for 6 weeks.    Discharge Medication/Follow-up:  [x]  Please refer to discharge medication reconciliation form.  [x]  Do not take ANY Aspirin or Aspirin containing products for 7 days after surgery. May resume aspirin on 10/23.  [x]  Do not take ANY herbal supplements for 2 weeks after surgery.    [x]  Do not take ANY non-steroidal anti-inflammatory drugs (NSAIDS), including the following: ibuprofen, naprosyn, Aleve, Advil, Indocin, Mobic, or Celebrex for 6 weeks after surgery.   [x]  Prescriptions for appropriate medication will be given upon discharge.  [x]  Take docusate (Colace 100 mg): take one capsule a day as needed for constipation. You can get this over the counter.  [x]  Follow-up appointment:  [x]  10-14 days post-op for wound check by physician assistant/nurse  [x]  4-6 weeks with MD:  [x]  with x-rays  [x]  An appointment will be mailed to you.    Wound Care:  [x]  No bandage required. Keep your incision open to the air.  [x]  You may shower on the 2nd day after your surgery. Keep the incision clean and dry at all times. Please cover the incision while showering and REMOVE once you have  completed taking your shower. Do not allow the force of water to hit the incision. If the incision gets damp, pat it dry. Do not rub or scrub the incision.  [x]  You cannot take a bath until 8 weeks after surgery.  [x]  Apply bacitracin to incision twice a day for 2 weeks.    Call your doctor or go to the Emergency Room for any signs of infection, including: increased redness, drainage, pain, or fever (temperature ?101.5 for 24 hours). Call your doctor or go to the Emergency Room if there are any localized neurological changes; problems with speech, vision, numbness, tingling, weakness, or severe headache; or for other concerns.    Special Instructions:  [x]  No use of tobacco products.  [x]  Diet: Please eat a regular diet as tolerated.      Physicians need 3 days' notice for pain medicine to be refilled. Pain medicine will only be refilled between 8 AM and 5 PM, Monday through Friday, due to Food and Drug Administration regulation of documentation.    If you have any questions about this form, please call [phone].    Form No. 17871 (Revised 10/31/2013)
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
## 2019-10-17 NOTE — BRIEF OP NOTE
[facility]  Brief Operative Note    SUMMARY     Surgery Date: 10/16/2019     Surgeon(s) and Role:     * Jay Reyes MD - Primary     * Stephanie Macario MD - Cosurgeon      * rCuzito Garcia MD,- Resident      * Deric Reed MD- Resident         Pre-op Diagnosis:  Compression fracture of C6 vertebra, initial encounter [S12.590A]    Post-op Diagnosis:  Post-Op Diagnosis Codes:     * Compression fracture of C6 vertebra, initial encounter [S12.590A]    Procedure(s) (LRB):  DISCECTOMY, SPINE, CERVICAL, ANTERIOR C6/7 ACDF (N/A)    Anesthesia: General    Description of Procedure: C6-C7 ACDF    Description of the findings of the procedure: Please see full op note for more details    Estimated Blood Loss: <25cc         Specimens:   Specimen (12h ago, onward)    None

## 2019-10-17 NOTE — HOSPITAL COURSE
10/15: NAEON. Patient admitted to [facility] from ED. MRI and CT c-spine w/ C6 Teardrop fracture. Aspen collar at all times. Plan for C6/C7 ACDF today. Pt with complex cardiac history, preop clearance obtained from Cardiology. HM consulted for management of comorbidities, appreciate assistance.  10/16: NAEON. Pt remains neurologically stable on exam. Collar in place. Surgery pushed back yesterday, scheduled for OR today.  10/17: POD#1 from emergent C6/7 ACDF. NAEON. Pt neurologically stable on exam, wife at bedside. Pain controlled on oral medications. Collar in place. Drain with 20cc's output, pulled today with no complications. Pt had difficulty swallowing after previous cardiac procedure, requiring temporary PEG afterward. Pt tolerating liquid diet with no issues swallowing. Advanced to regular diet for lunch, pt ate 100% without difficulty. Voiding appropriately. Pt denies any radiating pain, headaches, weakness, or numbness. Ambulated well down hallway on exam. Pt medically stable for discharge home today.

## 2019-10-17 NOTE — ASSESSMENT & PLAN NOTE
CT/MRI of cervical spine with anterior inferior avulsion fracture of the C6 vertebral body    Recommendations  - per primary team, ortho  - POD #1 s/p C6-7 ACDF  - plan to discharge with home health

## 2019-10-17 NOTE — SUBJECTIVE & OBJECTIVE
Interval History: POD #1 s/p C6/7 ACDF. Patient feels well, anxious to go home. Tolerating diet without difficulty.     Review of Systems   Constitutional: Negative for chills and fever.   Respiratory: Negative for shortness of breath.    Cardiovascular: Negative for chest pain and leg swelling.   Gastrointestinal: Negative for abdominal pain, nausea and vomiting.   Genitourinary: Negative for difficulty urinating.   Musculoskeletal: Negative for myalgias and neck pain.   Neurological: Negative for dizziness, weakness, numbness and headaches.     Objective:     Vital Signs (Most Recent):  Temp: 98.3 °F (36.8 °C) (10/17/19 0806)  Pulse: 78 (10/17/19 0806)  Resp: 18 (10/17/19 0806)  BP: 134/82 (10/17/19 0806)  SpO2: (!) 91 % (10/17/19 0806) Vital Signs (24h Range):  Temp:  [97.5 °F (36.4 °C)-98.3 °F (36.8 °C)] 98.3 °F (36.8 °C)  Pulse:  [61-87] 78  Resp:  [14-20] 18  SpO2:  [91 %-100 %] 91 %  BP: (121-169)/(58-82) 134/82     Weight: 76.2 kg (167 lb 15.9 oz)  Body mass index is 24.1 kg/m².    Intake/Output Summary (Last 24 hours) at 10/17/2019 1547  Last data filed at 10/17/2019 0900  Gross per 24 hour   Intake 2000 ml   Output 420 ml   Net 1580 ml        Physical Exam   Constitutional: He is oriented to person, place, and time. He appears well-developed and well-nourished. No distress.   HENT:   Head: Normocephalic and atraumatic.   Eyes: Conjunctivae are normal.   Neck:   C collar in place   Cardiovascular: Normal rate and regular rhythm.   No murmur heard.  Pulmonary/Chest: Effort normal and breath sounds normal. No respiratory distress. He has no wheezes. He has no rales.   Abdominal: Soft. There is no tenderness.   Musculoskeletal: Normal range of motion. He exhibits no edema.   Neurological: He is alert and oriented to person, place, and time.   Skin: Skin is warm and dry. He is not diaphoretic.   Psychiatric: He has a normal mood and affect. His behavior is normal.      Significant Labs: All pertinent labs  within the past 24 hours have been reviewed.    Significant Imaging: I have reviewed all pertinent imaging results/findings within the past 24 hours.
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
## 2019-10-17 NOTE — ASSESSMENT & PLAN NOTE
Hx of HTN managed with home amlodpine 5mg daily. Patient reports this dose has recently been increased to 10mg daily by cardiologist.    Recommendations:  - may resume amlodipine on discharge

## 2019-10-17 NOTE — ASSESSMENT & PLAN NOTE
Patient has unclassifiable indolent non-hodgkin's B-cell lymphoma with involvement in the bone marrow via BM bx on 7/13 with 5-10% of marrow involved and no myeloid disorder. Followed by Dr. Melton in heme/onc outpatient clinic (most recently 8/9/19) - notes that if patient requires treatment in future will consider repeat BM biopsy and rituximab, but no intervention needed at this time.    Recommendations  - stable/asymptomatic mild anemia and thrombocytopenia
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
## 2019-10-17 NOTE — ASSESSMENT & PLAN NOTE
"Performed by cardiology - "His estimated risk for an adverse cardiac outcome (myocardial infarction, cardiac arrest, or death) at 30 days with the proposed surgery is 10.0% (95% CI 8.2%-12.6%) (Revised Cardiac Risk Index [RCRI] Score = >=3 - CCS Criteria - [place] J Cardiol 2017; 33:17-32) based on the following risk factors: History of ischemic heart disease and History of congestive heart failure."      "
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
## 2019-10-17 NOTE — PLAN OF CARE
SW following for DC needs. SW in communication with Agatha DEL RIO.    No SW needs identified at this time.      10/17/19 3231   Post-Acute Status   Post-Acute Authorization Other   Other Status No Post-Acute Service Needs     Harika Restrepo, BRIAN  [facility]  [id number]

## 2019-10-17 NOTE — ASSESSMENT & PLAN NOTE
Hx CAD s/p CABG (5/2018) and severe MR 2/2 infective endocarditis s/p bioprosthetic MVR. Followed routinely outpatient by cardiologist Dr. Lockett.    Recommendations  - recommend resume ASA as soon as deemed safe from a surgical perspective  - continue atorvastatin 40mg daily

## 2019-10-17 NOTE — SUBJECTIVE & OBJECTIVE
Interval History: POD#1 from emergent C6/7 ACDF. NAEON. Pt neurologically stable on exam, wife at bedside. Pain controlled on oral medications. Collar in place. Drain with 20cc's output, pulled today with no complications. Pt had difficulty swallowing after previous cardiac procedure, requiring temporary PEG afterward. Pt tolerating liquid diet with no issues swallowing. Advanced to regular diet for lunch, pt ate 100% without difficulty. Voiding appropriately. Pt denies any radiating pain, headaches, weakness, or numbness. Ambulated well down hallway on exam. Pt medically stable for discharge home today.     Medications:  Continuous Infusions:  Scheduled Meds:   amLODIPine  5 mg Oral Daily    ascorbic acid (vitamin C)  1,000 mg Oral Every other day    atorvastatin  40 mg Oral Daily    cetirizine  10 mg Oral Daily    docusate  100 mg Oral Daily    fluticasone propionate  1 spray Each Nostril Daily    gabapentin  100 mg Oral QHS    heparin (porcine)  5,000 Units Subcutaneous Q8H    methocarbamol  500 mg Oral QID    mirtazapine  30 mg Oral QHS    pantoprazole  40 mg Oral Daily    tamsulosin  0.8 mg Oral Daily     PRN Meds:acetaminophen, HYDROcodone-acetaminophen, HYDROmorphone, ondansetron     Review of Systems  Objective:     Weight: 76.2 kg (167 lb 15.9 oz)  Body mass index is 24.1 kg/m².  Vital Signs (Most Recent):  Temp: 98.3 °F (36.8 °C) (10/17/19 0806)  Pulse: 78 (10/17/19 0806)  Resp: 18 (10/17/19 0806)  BP: 134/82 (10/17/19 0806)  SpO2: (!) 91 % (10/17/19 0806) Vital Signs (24h Range):  Temp:  [97.5 °F (36.4 °C)-98.3 °F (36.8 °C)] 98.3 °F (36.8 °C)  Pulse:  [61-87] 78  Resp:  [14-20] 18  SpO2:  [91 %-100 %] 91 %  BP: (121-169)/(58-82) 134/82     Date 10/17/19 0700 - 10/18/19 0659   Shift 4334-5458 2404-1462 7569-6633 24 Hour Total   INTAKE   Shift Total(mL/kg)       OUTPUT   Drains 20   20   Shift Total(mL/kg) 20(0.3)   20(0.3)   Weight (kg) 76.2 76.2 76.2 76.2                         Gastrostomy/Enterostomy 06/07/18 1657 Gastrostomy-jejunostomy LUQ (Active)            Gastrostomy/Enterostomy LUQ (Active)            Gastrostomy/Enterostomy 06/29/18 2200 Gastrostomy-jejunostomy (Active)       Neurosurgery Physical Exam    General: well developed, well nourished, no distress.   Head: normocephalic, atraumatic  Neck: Soft. Cervical collar in place, restricting ROM appropriately.  Neurologic: Alert and oriented. Thought content appropriate.  GCS: Motor: 6/Verbal: 5/Eyes: 4 GCS Total: 15  Mental Status: Awake, Alert, Oriented x 4  Language: No aphasia  Speech: No dysarthria  Cranial nerves: face symmetric, tongue midline, CN II-XII grossly intact.   Eyes: pupils equal, round, reactive to light with accomodation, EOMI.  Ears: No drainage.   Pulmonary: normal respirations, no signs of respiratory distress  Abdomen: soft, non-distended, not tender to palpation    Sensory: intact to light touch throughout  Motor Strength: Moves all extremities spontaneously with good tone.  Full strength upper and lower extremities. No abnormal movements seen.     Strength  Deltoids Triceps Biceps Wrist Extension Wrist Flexion Hand    Upper: R 5/5 5/5 5/5 5/5 5/5 5/5    L 5/5 5/5 5/5 5/5 5/5 5/5     Iliopsoas Quadriceps Knee  Flexion Tibialis  anterior Gastro- cnemius EHL   Lower: R 5/5 5/5 5/5 5/5 5/5 5/5    L 5/5 5/5 5/5 5/5 5/5 5/5     [place]: absent  Clonus: absent  Vascular: Pedal pulses 2+ and symmetric. No LE edema.   Skin: Skin is warm, dry and intact.  Gait: normal     Incision: Clean, dry, dermabond intact. Skin edges well approximated. No surrounding erythema or edema. No drainage or TTP.     Drain: Hemovac drain in place, holding suction with minimal serosanguinous output.       Significant Labs:  Recent Labs   Lab 10/16/19  0537 10/17/19  0528   GLU 90 119*    143   K 4.5 4.5   * 112*   CO2 24 19*   BUN 22 22   CREATININE 1.2 1.2   CALCIUM 8.6* 8.6*     Recent Labs   Lab 10/16/19  0537  10/17/19  0528   WBC 4.78 5.83   HGB 10.0* 10.5*   HCT 31.3* 32.6*   * 99*     No results for input(s): LABPT, INR, APTT in the last 48 hours.  Microbiology Results (last 7 days)     ** No results found for the last 168 hours. **        All pertinent labs from the last 24 hours have been reviewed.    Significant Diagnostics:  I have reviewed and interpreted all pertinent imaging results/findings within the past 24 hours.

## 2019-10-18 ENCOUNTER — TELEPHONE (OUTPATIENT)
Dept: NEUROSURGERY | Facility: CLINIC | Age: 69
End: 2019-10-18

## 2019-10-18 NOTE — TELEPHONE ENCOUNTER
----- Message from Sindhu White MA sent at 10/18/2019 11:25 AM CDT -----  Contact: pt wife   Hey the wife wants directive about dressing   ----- Message -----  From: Jeremy Soto  Sent: 10/18/2019  10:41 AM CDT  To: Amy HERNANDEZ Staff    Please call pt wife at [phone]    Questions regarding dressing change only. Patient is not having any medical problems    Thank you

## 2019-10-21 ENCOUNTER — TELEPHONE (OUTPATIENT)
Dept: FAMILY MEDICINE | Facility: CLINIC | Age: 69
End: 2019-10-21

## 2019-10-21 ENCOUNTER — PATIENT OUTREACH (OUTPATIENT)
Dept: ADMINISTRATIVE | Facility: CLINIC | Age: 69
End: 2019-10-21

## 2019-10-21 DIAGNOSIS — R53.81 DEBILITY: ICD-10-CM

## 2019-10-21 DIAGNOSIS — S12.9XXD CLOSED FRACTURE OF CERVICAL VERTEBRA, UNSPECIFIED CERVICAL VERTEBRAL LEVEL, SUBSEQUENT ENCOUNTER: Primary | ICD-10-CM

## 2019-10-21 RX ORDER — ATORVASTATIN CALCIUM 40 MG/1
40 TABLET, FILM COATED ORAL DAILY
COMMUNITY
End: 2020-10-13 | Stop reason: SDUPTHER

## 2019-10-21 NOTE — PATIENT INSTRUCTIONS
Sepsis     To treat sepsis, antibiotics and fluids may by given through an intravenous (IV) line.     Sepsis happens when your body responds with widespread inflammation to a bad infection or bacteremia--the presence of bacteria in your bloodstream. Sepsis can be deadly. Blood pressure may drop and the lungs and kidneys may start to fail. Emergency care for sepsis is crucial.  Risk factors  Those most at risk for sepsis are:  · Infants or older adults  · People who have an illness that weakens their immune system, such as cancer, AIDS, or diabetes  · People being treated with chemotherapy medicines or radiation, which weakens the immune system  · People who have had a transplant  · People with a very severe infection such as pneumonia, meningitis, or a urinary tract infection  When to go to the emergency department (ED)  Sepsis is an emergency. Go to the nearest ED if you have a fever with any of these symptoms:  · Chills and shaking  · Rapid heartbeat and breathing  · Trouble breathing  · Severe nausea or uncontrolled vomiting  · Confusion, disorientation, drowsiness, or dizziness  · Decreased urination  · Severe pain, including in the back or joints   What to expect in the ED  · Blood and urine tests are done to look for bacteria. They also check for organ failure.  · Blood, urine, or sputum cultures may be taken. The samples are sent to a lab. They are placed in a special container. Any bacteria should grow in 24 hours.  · X-rays or other imaging tests may be done.  A person with sepsis will be admitted to the hospital and treated with antibiotics. Treatment may also include oxygen and intravenous fluids.  Date Last Reviewed: 10/1/2016  © 8453-0752 [employer] [address] [place] [address], [place] [address]. All rights reserved. This information is not intended as a substitute for professional medical care. Always follow your healthcare professional's instructions.

## 2019-10-21 NOTE — TELEPHONE ENCOUNTER
----- Message from Alpa Brown LPN sent at 10/21/2019  9:23 AM CDT -----  Patient's wife would like to know if pt can resume his supplements. States that he was told to hold saw palmetto and magnesium oxide. Please contact patient to discuss.      Respectfully,  Alpa Brown LPN  [facility]    [email or web address]       Please do not reply to this message, as this inbox is not routinely monitored.

## 2019-10-21 NOTE — TELEPHONE ENCOUNTER
No HH ordered originally - he thought he would be ok without it but  He now decided  he would like to have Bong   He is feeling ok but weak  Is his walking around ( went to park yesterday ) - per d/c instructions   C/o a lot of pain/shoulders

## 2019-10-24 PROCEDURE — G0180 MD CERTIFICATION HHA PATIENT: HCPCS | Mod: ,,, | Performed by: FAMILY MEDICINE

## 2019-10-24 PROCEDURE — G0180 PR HOME HEALTH MD CERTIFICATION: ICD-10-PCS | Mod: ,,, | Performed by: FAMILY MEDICINE

## 2019-10-29 ENCOUNTER — PATIENT OUTREACH (OUTPATIENT)
Dept: ADMINISTRATIVE | Facility: OTHER | Age: 69
End: 2019-10-29
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
## 2019-10-31 ENCOUNTER — OFFICE VISIT (OUTPATIENT)
Dept: CARDIOLOGY | Facility: CLINIC | Age: 69
End: 2019-10-31
Payer: MEDICARE

## 2019-10-31 VITALS
WEIGHT: 165.56 LBS | BODY MASS INDEX: 23.7 KG/M2 | DIASTOLIC BLOOD PRESSURE: 70 MMHG | OXYGEN SATURATION: 99 % | HEART RATE: 75 BPM | SYSTOLIC BLOOD PRESSURE: 123 MMHG | HEIGHT: 70 IN

## 2019-10-31 DIAGNOSIS — Z86.79 H/O BACTERIAL ENDOCARDITIS: ICD-10-CM

## 2019-10-31 DIAGNOSIS — I49.3 PVC'S (PREMATURE VENTRICULAR CONTRACTIONS): ICD-10-CM

## 2019-10-31 DIAGNOSIS — I49.1 PREMATURE ATRIAL COMPLEXES: ICD-10-CM

## 2019-10-31 DIAGNOSIS — Z86.79 H/O ATRIAL FLUTTER: ICD-10-CM

## 2019-10-31 DIAGNOSIS — Z95.2 S/P MVR (MITRAL VALVE REPLACEMENT): Primary | ICD-10-CM

## 2019-10-31 DIAGNOSIS — Z95.1 S/P CABG X 1: ICD-10-CM

## 2019-10-31 DIAGNOSIS — Z12.31 VISIT FOR SCREENING MAMMOGRAM: Primary | ICD-10-CM

## 2019-10-31 DIAGNOSIS — I49.3 ASYMPTOMATIC PVCS: ICD-10-CM

## 2019-10-31 DIAGNOSIS — I10 ESSENTIAL HYPERTENSION: Chronic | ICD-10-CM

## 2019-10-31 PROCEDURE — 3074F SYST BP LT 130 MM HG: CPT | Mod: HCNC,CPTII,S$GLB, | Performed by: INTERNAL MEDICINE

## 2019-10-31 PROCEDURE — 3078F DIAST BP <80 MM HG: CPT | Mod: HCNC,CPTII,S$GLB, | Performed by: INTERNAL MEDICINE

## 2019-10-31 PROCEDURE — 3078F PR MOST RECENT DIASTOLIC BLOOD PRESSURE < 80 MM HG: ICD-10-PCS | Mod: HCNC,CPTII,S$GLB, | Performed by: INTERNAL MEDICINE

## 2019-10-31 PROCEDURE — 99999 PR PBB SHADOW E&M-EST. PATIENT-LVL III: ICD-10-PCS | Mod: PBBFAC,HCNC,, | Performed by: INTERNAL MEDICINE

## 2019-10-31 PROCEDURE — 1101F PT FALLS ASSESS-DOCD LE1/YR: CPT | Mod: HCNC,CPTII,S$GLB, | Performed by: INTERNAL MEDICINE

## 2019-10-31 PROCEDURE — 93000 ELECTROCARDIOGRAM COMPLETE: CPT | Mod: HCNC,S$GLB,, | Performed by: INTERNAL MEDICINE

## 2019-10-31 PROCEDURE — 1101F PR PT FALLS ASSESS DOC 0-1 FALLS W/OUT INJ PAST YR: ICD-10-PCS | Mod: HCNC,CPTII,S$GLB, | Performed by: INTERNAL MEDICINE

## 2019-10-31 PROCEDURE — 93000 EKG 12-LEAD: ICD-10-PCS | Mod: HCNC,S$GLB,, | Performed by: INTERNAL MEDICINE

## 2019-10-31 PROCEDURE — 99214 PR OFFICE/OUTPT VISIT, EST, LEVL IV, 30-39 MIN: ICD-10-PCS | Mod: HCNC,25,S$GLB, | Performed by: INTERNAL MEDICINE

## 2019-10-31 PROCEDURE — 99214 OFFICE O/P EST MOD 30 MIN: CPT | Mod: HCNC,25,S$GLB, | Performed by: INTERNAL MEDICINE

## 2019-10-31 PROCEDURE — 3074F PR MOST RECENT SYSTOLIC BLOOD PRESSURE < 130 MM HG: ICD-10-PCS | Mod: HCNC,CPTII,S$GLB, | Performed by: INTERNAL MEDICINE

## 2019-10-31 PROCEDURE — 99999 PR PBB SHADOW E&M-EST. PATIENT-LVL III: CPT | Mod: PBBFAC,HCNC,, | Performed by: INTERNAL MEDICINE
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
## 2019-11-04 ENCOUNTER — HOSPITAL ENCOUNTER (OUTPATIENT)
Dept: CARDIOLOGY | Facility: HOSPITAL | Age: 69
Discharge: HOME | End: 2019-11-04
Attending: INTERNAL MEDICINE
Payer: MEDICARE

## 2019-11-04 DIAGNOSIS — Z95.2 S/P MVR (MITRAL VALVE REPLACEMENT): ICD-10-CM

## 2019-11-04 DIAGNOSIS — I49.1 PREMATURE ATRIAL COMPLEXES: ICD-10-CM

## 2019-11-04 DIAGNOSIS — I49.3 ASYMPTOMATIC PVCS: ICD-10-CM

## 2019-11-04 DIAGNOSIS — Z95.1 S/P CABG X 1: ICD-10-CM

## 2019-11-04 PROCEDURE — 93227 HOLTER MONITOR - 24 HOUR (CUPID ONLY): ICD-10-PCS | Mod: HCNC,,, | Performed by: INTERNAL MEDICINE

## 2019-11-04 PROCEDURE — 93227 XTRNL ECG REC<48 HR R&I: CPT | Mod: HCNC,,, | Performed by: INTERNAL MEDICINE

## 2019-11-04 PROCEDURE — 93226 XTRNL ECG REC<48 HR SCAN A/R: CPT | Mod: HCNC,PO

## 2019-11-05 NOTE — PROGRESS NOTES
Chief Complaint  No chief complaint on file.      HPI  Rodolfo Messina is a 69 y.o. male with multiple medical diagnoses as listed in the medical history and problem list that presents for left sided abdominal pain.  He had similar pain about 6 months ago and we did an US that showed gallstones.   Patient reports intermittent left sided.  No constipation, diarrhea,  or vomiting.  Some nausea that he associates with sinus drainage.  He also notes a lot of gas recently.  This time the pain has been present for about 2 week and may be associated with muscle pain from working in the garden.  Not worse with food.  No reflux symptoms.       PAST MEDICAL HISTORY:  Past Medical History:   Diagnosis Date    ANNA (acute kidney injury) 2/22/2018    Anemia     Anxiety     BPH (benign prostatic hyperplasia)     Coarse tremors     Coronary artery disease of native artery of native heart with stable angina pectoris 5/21/2018    Diverticulosis     Encounter for blood transfusion     Endocarditis     Mechanical heart valve present     Non Hodgkin's lymphoma 8/2/2018    Urinary retention        PAST SURGICAL HISTORY:  Past Surgical History:   Procedure Laterality Date    ANTERIOR CERVICAL DISCECTOMY N/A 10/16/2019    Procedure: DISCECTOMY, SPINE, CERVICAL, ANTERIOR C6/7 ACDF;  Surgeon: Jay Reyes MD;  [facility];  Service: Neurosurgery;  Laterality: N/A;    APPENDECTOMY      COLONOSCOPY      COLONOSCOPY N/A 2/1/2018    Procedure: COLONOSCOPY;  Surgeon: Richar Payan MD;  Location: [facility] (4TH FLR);  Service: Endoscopy;  Laterality: N/A;  PM prep    CORONARY ARTERY BYPASS GRAFT (CABG) N/A 5/25/2018    Procedure: AORTOCORONARY BYPASS-CABG;  Surgeon: Marvin Go MD;  [facility];  Service: Cardiovascular;  Laterality: N/A;    EYE SURGERY Right     cataract extraction    FINGER SURGERY      FRACTURE SURGERY Right     index finger    MITRAL VALVE REPLACEMENT N/A 5/25/2018     Procedure: Mitral Valve Replacement;  Surgeon: Marvin Go MD;  Location: [facility] OR [address] [place] [address];  Service: Cardiovascular;  Laterality: N/A;    TONSILLECTOMY         SOCIAL HISTORY:  Social History     Socioeconomic History    Marital status:      Spouse name: Not on file    Number of children: Not on file    Years of education: Not on file    Highest education level: Not on file   Occupational History     Employer: [employer]   Social Needs    Financial resource strain: Not on file    Food insecurity:     Worry: Not on file     Inability: Not on file    Transportation needs:     Medical: Not on file     Non-medical: Not on file   Tobacco Use    Smoking status: Never Smoker    Smokeless tobacco: Never Used   Substance and Sexual Activity    Alcohol use: Yes     Comment: social    Drug use: No    Sexual activity: Not Currently     Partners: Female   Lifestyle    Physical activity:     Days per week: Not on file     Minutes per session: Not on file    Stress: Not on file   Relationships    Social connections:     Talks on phone: Not on file     Gets together: Not on file     Attends Spiritism service: Not on file     Active member of club or organization: Not on file     Attends meetings of clubs or organizations: Not on file     Relationship status: Not on file   Other Topics Concern    Not on file   Social History Narrative    Not on file       FAMILY HISTORY:  Family History   Problem Relation Age of Onset    Stroke Mother     Heart disease Mother         CABG    Heart disease Father         CABG    No Known Problems Sister     No Known Problems Brother     Melanoma Neg Hx        ALLERGIES AND MEDICATIONS: updated and reviewed.  Review of patient's allergies indicates:   Allergen Reactions    Metoprolol Diarrhea    Shellfish containing products     Augmentin [amoxicillin-pot clavulanate]      Patient felt that it raised BP and requested to have it added as intolerance. Tolerated unasyn  and ampicillin.    Ciprofloxacin     Flagyl [metronidazole]     Lisinopril Other (See Comments)     cough    Mysoline [primidone]     Omnicef [cefdinir]      Current Outpatient Medications   Medication Sig Dispense Refill    amLODIPine (NORVASC) 5 MG tablet Take 1 tablet (5 mg total) by mouth once daily. (Patient taking differently: Take 10 mg by mouth once daily. ) 90 tablet 3    ascorbic acid, vitamin C, (VITAMIN C) 1000 MG tablet Take 1,000 mg by mouth every other day.      atorvastatin (LIPITOR) 40 MG tablet GIVE 1 TABLET VIA G TUBE DAILY 90 tablet 3    atorvastatin (LIPITOR) 40 MG tablet Take 40 mg by mouth once daily.      fluticasone propionate (FLONASE) 50 mcg/actuation nasal spray USE 1 SPRAY IN EACH NOSTRIL EVERY DAY 32 g 3    gabapentin (NEURONTIN) 100 MG capsule Take 100 mg by mouth every evening.      HYDROcodone-acetaminophen (NORCO) 7.5-325 mg per tablet Take 1 tablet by mouth every 4 (four) hours as needed for Pain. 60 tablet 0    lactose-reduced food (ENSURE HIGH PROTEIN-MUSCLE ORAL) Take by mouth once daily.       loratadine (CLARITIN) 10 mg tablet Take 10 mg by mouth once daily.      magnesium 250 mg Tab Take by mouth once daily.      melatonin 10 mg Tab Take by mouth as needed.       methocarbamol (ROBAXIN) 500 MG Tab Take 1 tablet (500 mg total) by mouth every 6 (six) hours as needed (muscle spasms). 40 tablet 0    mirtazapine (REMERON) 30 MG tablet TAKE 1 TABLET EVERY EVENING 90 tablet 3    multivit-min/FA/lycopen/lutein (ADULTS 50 PLUS ORAL) Take by mouth every other day.       pantoprazole (PROTONIX) 40 MG tablet TAKE 1 TABLET EVERY DAY 90 tablet 5    SAW PALMETTO ORAL Take 450 mg by mouth once daily.      tamsulosin (FLOMAX) 0.4 mg Cap Take 2 capsules (0.8 mg total) by mouth once daily. 180 capsule 0     No current facility-administered medications for this visit.          ROS  Review of Systems   Constitutional: Negative for activity change, appetite change, chills and  "fatigue.   HENT: Negative for congestion, ear discharge, hearing loss, mouth sores, rhinorrhea, sinus pain and trouble swallowing.    Eyes: Negative for photophobia, pain, discharge and visual disturbance.   Respiratory: Negative for cough, chest tightness, shortness of breath and wheezing.    Cardiovascular: Negative for chest pain, palpitations and leg swelling.   Gastrointestinal: Positive for nausea. Negative for abdominal distention, abdominal pain, blood in stool, constipation, diarrhea and vomiting.   Genitourinary: Negative for difficulty urinating, dysuria and flank pain.   Musculoskeletal: Negative for arthralgias, back pain, gait problem, joint swelling and myalgias.   Skin: Negative for color change and rash.   Neurological: Negative for dizziness, tremors, speech difficulty, light-headedness, numbness and headaches.   Psychiatric/Behavioral: Negative for behavioral problems, confusion, hallucinations, sleep disturbance and suicidal ideas.           PHYSICAL EXAM  /78 (BP Location: Left arm, Patient Position: Sitting)   Pulse 83   Temp 98.4 °F (36.9 °C) (Oral)   Ht 5' 10" (1.778 m)   Wt 75 kg (165 lb 3.8 oz)   SpO2 98%   BMI 23.71 kg/m²           Physical Exam   Constitutional: He is oriented to person, place, and time. He appears well-developed. No distress.   HENT:   Head: Normocephalic.   Right Ear: External ear normal.   Left Ear: External ear normal.   Mouth/Throat: No oropharyngeal exudate.   Eyes: Conjunctivae are normal. Right eye exhibits no discharge. Left eye exhibits no discharge.   Neck: Normal range of motion. Neck supple. No thyromegaly present.   Cardiovascular: Normal rate and regular rhythm. Exam reveals no friction rub.   No murmur heard.  Pulmonary/Chest: Effort normal and breath sounds normal. No stridor. No respiratory distress. He has no wheezes.   Abdominal: Soft. Bowel sounds are normal. He exhibits no distension. There is no hepatosplenomegaly. There is no " tenderness. There is no guarding. No hernia.   Multiple scars from surgeries.    Musculoskeletal: Normal range of motion. He exhibits no edema or deformity.   Neurological: He is alert and oriented to person, place, and time. No cranial nerve deficit. Coordination normal.   Skin: Skin is warm. No rash noted. He is not diaphoretic. No erythema. No pallor.   Psychiatric: He has a normal mood and affect. Thought content normal.   Nursing note and vitals reviewed.        Health Maintenance       Date Due Completion Date    Pneumococcal Vaccine (65+ High/Highest Risk) (1 of 2 - PCV13) 10/31/2019 (Originally 6/15/2015) ---    Influenza Vaccine 03/18/2020 (Originally 8/1/2018) 10/13/2017 (Declined)    Override on 10/13/2017: Declined    Override on 8/29/2016: Declined    Fecal Occult Blood Test (FOBT)/[facility] 07/08/2019 7/8/2018    Override on 6/15/2017: Done (negative)    High Dose Statin 03/26/2020 3/26/2019    Lipid Panel 06/13/2023 6/13/2018    TETANUS VACCINE 08/24/2026 8/24/2016            Assessment & Plan      Abdominal pain, unspecified abdominal location  -     US Abdomen Complete; Future; Expected date: 11/06/2019    Anemia, unspecified type  -     Reticulocytes; Future; Expected date: 11/06/2019  -     Iron and TIBC; Future; Expected date: 11/06/2019    Benign prostatic hyperplasia, unspecified whether lower urinary tract symptoms present  -     PSA, total and free; Future; Expected date: 11/06/2019    B12 deficiency  -     Vitamin B12; Future; Expected date: 11/06/2019          Follow-up: No follow-ups on file.

## 2019-11-06 ENCOUNTER — OFFICE VISIT (OUTPATIENT)
Dept: FAMILY MEDICINE | Facility: CLINIC | Age: 69
End: 2019-11-06
Payer: MEDICARE

## 2019-11-06 VITALS
SYSTOLIC BLOOD PRESSURE: 130 MMHG | DIASTOLIC BLOOD PRESSURE: 78 MMHG | BODY MASS INDEX: 23.66 KG/M2 | HEIGHT: 70 IN | TEMPERATURE: 98 F | OXYGEN SATURATION: 98 % | HEART RATE: 83 BPM | WEIGHT: 165.25 LBS

## 2019-11-06 DIAGNOSIS — N40.0 BENIGN PROSTATIC HYPERPLASIA, UNSPECIFIED WHETHER LOWER URINARY TRACT SYMPTOMS PRESENT: ICD-10-CM

## 2019-11-06 DIAGNOSIS — E53.8 B12 DEFICIENCY: ICD-10-CM

## 2019-11-06 DIAGNOSIS — D64.9 ANEMIA, UNSPECIFIED TYPE: ICD-10-CM

## 2019-11-06 DIAGNOSIS — R10.9 ABDOMINAL PAIN, UNSPECIFIED ABDOMINAL LOCATION: Primary | ICD-10-CM

## 2019-11-06 PROCEDURE — 1101F PT FALLS ASSESS-DOCD LE1/YR: CPT | Mod: CPTII,S$GLB,, | Performed by: FAMILY MEDICINE

## 2019-11-06 PROCEDURE — 3075F SYST BP GE 130 - 139MM HG: CPT | Mod: CPTII,S$GLB,, | Performed by: FAMILY MEDICINE

## 2019-11-06 PROCEDURE — 99214 PR OFFICE/OUTPT VISIT, EST, LEVL IV, 30-39 MIN: ICD-10-PCS | Mod: S$GLB,,, | Performed by: FAMILY MEDICINE

## 2019-11-06 PROCEDURE — 99214 OFFICE O/P EST MOD 30 MIN: CPT | Mod: S$GLB,,, | Performed by: FAMILY MEDICINE

## 2019-11-06 PROCEDURE — 3078F PR MOST RECENT DIASTOLIC BLOOD PRESSURE < 80 MM HG: ICD-10-PCS | Mod: CPTII,S$GLB,, | Performed by: FAMILY MEDICINE

## 2019-11-06 PROCEDURE — 1101F PR PT FALLS ASSESS DOC 0-1 FALLS W/OUT INJ PAST YR: ICD-10-PCS | Mod: CPTII,S$GLB,, | Performed by: FAMILY MEDICINE

## 2019-11-06 PROCEDURE — 3078F DIAST BP <80 MM HG: CPT | Mod: CPTII,S$GLB,, | Performed by: FAMILY MEDICINE

## 2019-11-06 PROCEDURE — 3075F PR MOST RECENT SYSTOLIC BLOOD PRESS GE 130-139MM HG: ICD-10-PCS | Mod: CPTII,S$GLB,, | Performed by: FAMILY MEDICINE

## 2019-11-07 ENCOUNTER — HOSPITAL ENCOUNTER (OUTPATIENT)
Dept: RADIOLOGY | Facility: HOSPITAL | Age: 69
Discharge: HOME | End: 2019-11-07
Attending: FAMILY MEDICINE
Payer: MEDICARE

## 2019-11-07 DIAGNOSIS — R10.9 ABDOMINAL PAIN, UNSPECIFIED ABDOMINAL LOCATION: ICD-10-CM

## 2019-11-07 PROCEDURE — 76700 US EXAM ABDOM COMPLETE: CPT | Mod: TC,HCNC,PO

## 2019-11-08 ENCOUNTER — PATIENT MESSAGE (OUTPATIENT)
Dept: FAMILY MEDICINE | Facility: CLINIC | Age: 69
End: 2019-11-08

## 2019-11-08 ENCOUNTER — TELEPHONE (OUTPATIENT)
Dept: CARDIOLOGY | Facility: CLINIC | Age: 69
End: 2019-11-08

## 2019-11-08 LAB
OHS CV EVENT MONITOR DAY: 0
OHS CV HOLTER LENGTH DECIMAL HOURS: 24
OHS CV HOLTER LENGTH HOURS: 24
OHS CV HOLTER LENGTH MINUTES: 0

## 2019-11-11 ENCOUNTER — TELEPHONE (OUTPATIENT)
Dept: FAMILY MEDICINE | Facility: CLINIC | Age: 69
End: 2019-11-11

## 2019-11-11 NOTE — TELEPHONE ENCOUNTER
Patient advised of ultra sound and lab results. Patient states he or his wife doesn't use the portal and declined getting reset up. Portal deactivated at patient's request       ----- Message from Shu Morrissey sent at 11/11/2019  3:15 PM CST -----  Contact: [phone]/wife  Patient wife calling to speak with you concerning the patient test results   Please call back to assist at [phone]

## 2019-11-13 ENCOUNTER — TELEPHONE (OUTPATIENT)
Dept: NEUROSURGERY | Facility: CLINIC | Age: 69
End: 2019-11-13

## 2019-11-13 NOTE — TELEPHONE ENCOUNTER
----- Message from Annette Rodrigues sent at 11/13/2019  9:55 AM CST -----  Contact: Saumya (Robert) [phone] Ext [phone]  Saumya called to speak to someone regarding an order for a bone growth stimulator. The code is . They're requesting a peer to peer. It needs to be approved by 11/15/19.  Please contact Saumya to discuss further.

## 2019-11-14 RX ORDER — GABAPENTIN 100 MG/1
CAPSULE ORAL
Qty: 90 CAPSULE | Refills: 11 | Status: SHIPPED | OUTPATIENT
Start: 2019-11-14 | End: 2020-06-29 | Stop reason: SDUPTHER

## 2019-11-15 ENCOUNTER — EXTERNAL HOME HEALTH (OUTPATIENT)
Dept: HOME HEALTH SERVICES | Facility: HOSPITAL | Age: 69
End: 2019-11-15
Payer: MEDICARE

## 2019-11-22 ENCOUNTER — TELEPHONE (OUTPATIENT)
Dept: FAMILY MEDICINE | Facility: CLINIC | Age: 69
End: 2019-11-22

## 2019-11-22 RX ORDER — METHOCARBAMOL 500 MG/1
500 TABLET, FILM COATED ORAL EVERY 6 HOURS PRN
Qty: 40 TABLET | Refills: 1 | Status: SHIPPED | OUTPATIENT
Start: 2019-11-22 | End: 2021-08-03

## 2019-11-22 NOTE — TELEPHONE ENCOUNTER
----- Message from Cassia Sahu sent at 11/22/2019 10:53 AM CST -----  Contact: [phone] /wife Lupe  Type:  RX Refill Request    Who Called:  wife  Refill or New Rx: NEW  RX Name and Strength: methocarbamol (ROBAXIN) 500 MG Tab  How is the patient currently taking it? (ex. 1XDay): Take 1 tablet (500 mg total) by mouth every 6 (six) hours as needed (muscle spasms). - Oral  Is this a 30 day or 90 day RX: 40 pills  Preferred Pharmacy with phone number:  [id number] - [place], [place] - [address] [facility]  Local or Mail Order: l  Ordering Provider: Solo GAYLE  Would the patient rather a call back or a response via [facility]?  call  Best Call Back Number:   Additional Information:  Please call [phone] /issa Andrade

## 2019-12-03 ENCOUNTER — OFFICE VISIT (OUTPATIENT)
Dept: NEUROSURGERY | Facility: CLINIC | Age: 69
End: 2019-12-03
Payer: MEDICARE

## 2019-12-03 ENCOUNTER — HOSPITAL ENCOUNTER (OUTPATIENT)
Dept: RADIOLOGY | Facility: HOSPITAL | Age: 69
Discharge: HOME | End: 2019-12-03
Attending: PHYSICIAN ASSISTANT
Payer: MEDICARE

## 2019-12-03 VITALS
SYSTOLIC BLOOD PRESSURE: 123 MMHG | WEIGHT: 167.88 LBS | HEIGHT: 70 IN | HEART RATE: 84 BPM | DIASTOLIC BLOOD PRESSURE: 58 MMHG | BODY MASS INDEX: 24.03 KG/M2 | TEMPERATURE: 97 F

## 2019-12-03 DIAGNOSIS — S12.590D COMPRESSION FRACTURE OF C6 VERTEBRA WITH ROUTINE HEALING, SUBSEQUENT ENCOUNTER: Primary | ICD-10-CM

## 2019-12-03 DIAGNOSIS — S12.9XXA: ICD-10-CM

## 2019-12-03 PROCEDURE — 72040 X-RAY EXAM NECK SPINE 2-3 VW: CPT | Mod: TC,HCNC

## 2019-12-03 PROCEDURE — 99024 POSTOP FOLLOW-UP VISIT: CPT | Mod: HCNC,S$GLB,, | Performed by: NEUROLOGICAL SURGERY

## 2019-12-03 PROCEDURE — 99999 PR PBB SHADOW E&M-EST. PATIENT-LVL IV: CPT | Mod: PBBFAC,HCNC,, | Performed by: NEUROLOGICAL SURGERY

## 2019-12-03 PROCEDURE — 72040 X-RAY EXAM NECK SPINE 2-3 VW: CPT | Mod: 26,HCNC,, | Performed by: RADIOLOGY

## 2019-12-03 PROCEDURE — 99999 PR PBB SHADOW E&M-EST. PATIENT-LVL IV: ICD-10-PCS | Mod: PBBFAC,HCNC,, | Performed by: NEUROLOGICAL SURGERY

## 2019-12-03 PROCEDURE — 72040 XR CERVICAL SPINE AP LATERAL: ICD-10-PCS | Mod: 26,HCNC,, | Performed by: RADIOLOGY

## 2019-12-03 PROCEDURE — 99024 PR POST-OP FOLLOW-UP VISIT: ICD-10-PCS | Mod: HCNC,S$GLB,, | Performed by: NEUROLOGICAL SURGERY

## 2019-12-03 RX ORDER — DIAZEPAM 5 MG/1
5 TABLET ORAL EVERY 12 HOURS PRN
Qty: 60 TABLET | Refills: 0 | Status: SHIPPED | OUTPATIENT
Start: 2019-12-03 | End: 2020-10-13
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
## 2020-01-08 ENCOUNTER — PES CALL (OUTPATIENT)
Dept: ADMINISTRATIVE | Facility: CLINIC | Age: 70
End: 2020-01-08

## 2020-01-09 ENCOUNTER — TELEPHONE (OUTPATIENT)
Dept: NEUROSURGERY | Facility: CLINIC | Age: 70
End: 2020-01-09

## 2020-01-09 NOTE — TELEPHONE ENCOUNTER
----- Message from Carin Lan sent at 1/9/2020  2:20 PM CST -----  Contact: avelino from [facility]  Patient called to speak with a nurse concerning progress notes.    She would like a callback at [phone]    Thanks  KB

## 2020-01-18 NOTE — TELEPHONE ENCOUNTER
----- Message from Nathan Diaz RN sent at 11/26/2018 10:08 AM CST -----  Contact: Pt      ----- Message -----  From: Shani Mcqueen  Sent: 11/26/2018   9:45 AM  To: Abbi RODRIGUEZ Staff    Pt called to speak with Dr Go have some questions   Callback#[phone] or [phone]  Thank You  ALEJANDRO Mcqueen    
I called the patient and left his a message to call me back at [phone] ext [phone].     López Go MD PGY-VI  Hematology and Oncology  Pager:[phone]    
pt declines any refrrals, currently enrolled in [employer] SUBSTNACE PROGRAM

## 2020-01-26 NOTE — NURSING
Transport at bedside to transfer patient to IR for G/J tube placement. Spoke with Kesha in IR, consents will be obtained once patient in IR. Kaci in IR notified patient's family to stay in patient's room and would like updates to be pino to the room. Will continue to monitor.   LUNGS: Decreased air entry at the bases. CARDIOVASCULAR: Irregular irregular and slightly tachycardic with distant heart sounds. , no murmur, s3, s4 or rub noted. PV: No extremity edema. Pneumatic compression noted over both legs. Pedal pulses palpable. ABDOMEN: Soft, non-tender to light palpation. Bowel sounds present. No palpable masses no hepatosplenomegaly or splenomegaly; no abdominal bruit / pulsation  SKIN: Warm and dry. NEURO / PSYCH: Oriented to person, place and time. Speech clear and appropriate. Follows all commands. Pleasant affect  Monitor: PAF with RVR. Lab Review       Recent Labs     01/24/20  1056 01/25/20  0450 01/26/20  0512   WBC 26.7* 23.6* 9.8   HGB 14.0 11.6* 11.0*   HCT 42.8 36.9* 34.3*    173 163       Recent Labs     01/24/20  1056  01/24/20  1511 01/25/20  0450 01/26/20  0512     --   --  141 136   K 4.9   < > 4.68 4.7 4.3     --   --  106 101   CO2 25  --   --  19* 18*   BUN 15  --   --  23 36*   CREATININE 0.8  --   --  1.1 1.1    < > = values in this interval not displayed. Recent Labs     01/24/20  0445   AST 91*   ALT 44*   ALKPHOS 60       Last 3 Troponin:    Lab Results   Component Value Date    TROPONINI 6.45 01/23/2020         Recent Labs     01/23/20  1230 01/24/20  1056   INR 1.1 1.1       Assessment:  -CAD: Status post non-STEMI, ischemic cardiomyopathy with severe systolic dysfunction,  status post two-vessel CABG with LIMA to LAD and SVG to PDA yesterday. Balloon pump pulled out this morning. Stable with no angina. -PAF post CABG. CHADVASC score at least 5. Will need anticoagulation to prevent CVA. May use intravenous heparin for now if okay with Dr. Hermelinda Rodriguez.   -Prerenal azotemia. -Mild MR by postop report.  -Post op anemia      Plan:  -Wean epinephrine till off. It could be causing his atrial fibrillation.  -Continue intravenous amiodarone.   Will switch to oral amiodarone when patient remains in sinus rhythm.  -Will need beta-blocker and Entresto when epinephrine is off and if stable blood pressure.  -Will need LifeVest prior to hospital discharge. -IV heparin when allowed by Dr Benedicto Stoddard. Electronically signed by Bri Zaidi MD on 1/26/2020 at [facility] [address] [facility].

## 2020-02-10 ENCOUNTER — OFFICE VISIT (OUTPATIENT)
Dept: FAMILY MEDICINE | Facility: CLINIC | Age: 70
End: 2020-02-10
Payer: MEDICARE

## 2020-02-10 VITALS
SYSTOLIC BLOOD PRESSURE: 138 MMHG | WEIGHT: 166.25 LBS | BODY MASS INDEX: 23.8 KG/M2 | DIASTOLIC BLOOD PRESSURE: 78 MMHG | HEIGHT: 70 IN | OXYGEN SATURATION: 95 % | TEMPERATURE: 99 F | HEART RATE: 77 BPM

## 2020-02-10 DIAGNOSIS — Z95.1 S/P CABG X 1: ICD-10-CM

## 2020-02-10 DIAGNOSIS — N18.30 CKD (CHRONIC KIDNEY DISEASE) STAGE 3, GFR 30-59 ML/MIN: ICD-10-CM

## 2020-02-10 DIAGNOSIS — H91.91 HEARING LOSS OF RIGHT EAR, UNSPECIFIED HEARING LOSS TYPE: ICD-10-CM

## 2020-02-10 DIAGNOSIS — Z00.00 ENCOUNTER FOR PREVENTIVE HEALTH EXAMINATION: Primary | ICD-10-CM

## 2020-02-10 DIAGNOSIS — N40.0 BENIGN PROSTATIC HYPERPLASIA, UNSPECIFIED WHETHER LOWER URINARY TRACT SYMPTOMS PRESENT: ICD-10-CM

## 2020-02-10 DIAGNOSIS — S12.590D COMPRESSION FRACTURE OF C6 VERTEBRA WITH ROUTINE HEALING: ICD-10-CM

## 2020-02-10 DIAGNOSIS — Z95.2 S/P MVR (MITRAL VALVE REPLACEMENT): ICD-10-CM

## 2020-02-10 DIAGNOSIS — Z23 NEED FOR SHINGLES VACCINE: ICD-10-CM

## 2020-02-10 DIAGNOSIS — Z23 NEED FOR VACCINATION AGAINST STREPTOCOCCUS PNEUMONIAE: ICD-10-CM

## 2020-02-10 DIAGNOSIS — I50.22 CHRONIC SYSTOLIC CONGESTIVE HEART FAILURE: ICD-10-CM

## 2020-02-10 DIAGNOSIS — R25.1 TREMOR: Chronic | ICD-10-CM

## 2020-02-10 DIAGNOSIS — I10 HYPERTENSION, UNSPECIFIED TYPE: Chronic | ICD-10-CM

## 2020-02-10 DIAGNOSIS — D69.6 THROMBOCYTOPENIA, UNSPECIFIED: ICD-10-CM

## 2020-02-10 DIAGNOSIS — C85.10 B-CELL LYMPHOMA, UNSPECIFIED B-CELL LYMPHOMA TYPE, UNSPECIFIED BODY REGION: ICD-10-CM

## 2020-02-10 PROBLEM — R31.9 HEMATURIA: Status: RESOLVED | Noted: 2018-06-29 | Resolved: 2020-02-10

## 2020-02-10 PROBLEM — S12.9XXA: Status: RESOLVED | Noted: 2019-10-14 | Resolved: 2020-02-10

## 2020-02-10 PROBLEM — I05.9 PULMONARY HYPERTENSION DUE TO MITRAL VALVE DISEASE: Status: RESOLVED | Noted: 2018-02-27 | Resolved: 2020-02-10

## 2020-02-10 PROBLEM — I25.10 CAD (CORONARY ARTERY DISEASE): Status: RESOLVED | Noted: 2018-05-22 | Resolved: 2020-02-10

## 2020-02-10 PROBLEM — Z01.810 PREOP CARDIOVASCULAR EXAM: Status: RESOLVED | Noted: 2019-10-15 | Resolved: 2020-02-10

## 2020-02-10 PROBLEM — E87.5 HYPERKALEMIA: Status: RESOLVED | Noted: 2018-07-10 | Resolved: 2020-02-10

## 2020-02-10 PROBLEM — I27.22 PULMONARY HYPERTENSION DUE TO MITRAL VALVE DISEASE: Status: RESOLVED | Noted: 2018-02-27 | Resolved: 2020-02-10

## 2020-02-10 PROCEDURE — G0439 PPPS, SUBSEQ VISIT: HCPCS | Mod: HCNC,S$GLB,, | Performed by: PHYSICIAN ASSISTANT

## 2020-02-10 PROCEDURE — 99499 UNLISTED E&M SERVICE: CPT | Mod: HCNC,S$GLB,, | Performed by: PHYSICIAN ASSISTANT

## 2020-02-10 PROCEDURE — 3078F PR MOST RECENT DIASTOLIC BLOOD PRESSURE < 80 MM HG: ICD-10-PCS | Mod: HCNC,CPTII,S$GLB, | Performed by: PHYSICIAN ASSISTANT

## 2020-02-10 PROCEDURE — 3075F PR MOST RECENT SYSTOLIC BLOOD PRESS GE 130-139MM HG: ICD-10-PCS | Mod: HCNC,CPTII,S$GLB, | Performed by: PHYSICIAN ASSISTANT

## 2020-02-10 PROCEDURE — 3075F SYST BP GE 130 - 139MM HG: CPT | Mod: HCNC,CPTII,S$GLB, | Performed by: PHYSICIAN ASSISTANT

## 2020-02-10 PROCEDURE — G0439 PR MEDICARE ANNUAL WELLNESS SUBSEQUENT VISIT: ICD-10-PCS | Mod: HCNC,S$GLB,, | Performed by: PHYSICIAN ASSISTANT

## 2020-02-10 PROCEDURE — 99999 PR PBB SHADOW E&M-EST. PATIENT-LVL IV: ICD-10-PCS | Mod: PBBFAC,HCNC,, | Performed by: PHYSICIAN ASSISTANT

## 2020-02-10 PROCEDURE — 99999 PR PBB SHADOW E&M-EST. PATIENT-LVL IV: CPT | Mod: PBBFAC,HCNC,, | Performed by: PHYSICIAN ASSISTANT

## 2020-02-10 PROCEDURE — 99499 RISK ADDL DX/OHS AUDIT: ICD-10-PCS | Mod: HCNC,S$GLB,, | Performed by: PHYSICIAN ASSISTANT

## 2020-02-10 PROCEDURE — 3078F DIAST BP <80 MM HG: CPT | Mod: HCNC,CPTII,S$GLB, | Performed by: PHYSICIAN ASSISTANT

## 2020-02-10 RX ORDER — DEXBROMPHENIRAMINE MALEATE 2 MG/1
TABLET ORAL
Refills: 1 | COMMUNITY
Start: 2019-11-19 | End: 2020-10-13

## 2020-02-10 RX ORDER — ASPIRIN 81 MG/1
81 TABLET ORAL DAILY
COMMUNITY
End: 2024-03-20 | Stop reason: ALTCHOICE

## 2020-02-10 NOTE — PATIENT INSTRUCTIONS
Counseling and Referral of Other Preventative  (Italic type indicates deductible and co-insurance are waived)    Patient Name: Rodolfo Messina  Today's Date: 2/10/2020    Health Maintenance       Date Due Completion Date    Shingles Vaccine (1 of 2) 06/15/2000 ---    Pneumococcal Vaccine (65+ High/Highest Risk) (1 of 2 - PCV13) 06/15/2015 ---    High Dose Statin 02/10/2021 2/10/2020    Lipid Panel 08/08/2024 8/8/2019    TETANUS VACCINE 08/24/2026 8/24/2016    Colonoscopy 02/01/2028 2/1/2018        No orders of the defined types were placed in this encounter.    The following information is provided to all patients.  This information is to help you find resources for any of the problems found today that may be affecting your health:                Living healthy guide: www.[facility].[place].[facility]      Understanding Diabetes: www.diabetes.org      Eating healthy: www.cdc.gov/healthyweight      [facility] home safety checklist: www.cdc.gov/steadi/patient.html      Agency on Aging: www.goea.[place].[facility]      Alcoholics anonymous (AA): www.aa.org      Physical Activity: www.najma.nih.gov/ls6gjti      Tobacco use: www.quitwithusla.org

## 2020-02-10 NOTE — PROGRESS NOTES
"Rodolfo Messina presented for a Medicare AWV and comprehensive Health Risk Assessment today. He presented alone and without acute complaints. The following components were reviewed and updated:    · Medical history  · Family History  · Social history  · Allergies and Current Medications  · Health Risk Assessment  · Health Maintenance  · Care Team     ** See Completed Assessments for Annual Wellness Visit within the encounter summary.**       The following assessments were completed:  · Living Situation  · CAGE  · Depression Screening  · Timed Get Up and Go  · Whisper Test  · Cognitive Function Screening  · Nutrition Screening  · ADL Screening  · PAQ Screening    Vitals:    02/10/20 1306   BP: 138/78   Pulse: 77   Temp: 98.5 °F (36.9 °C)   SpO2: 95%   Weight: 75.4 kg (166 lb 3.6 oz)   Height: 5' 10" (1.778 m)     Body mass index is 23.85 kg/m².     Physical Exam   Constitutional: He is oriented to person, place, and time. He appears well-developed and well-nourished. No distress.   HENT:   Head: Normocephalic.   Right Ear: External ear normal. Tympanic membrane is not erythematous.   Left Ear: Hearing and external ear normal. Tympanic membrane is not erythematous.   Nose: Nose normal.   Mouth/Throat: Oropharynx is clear and moist. No oropharyngeal exudate.   +Wax R ear canal   Eyes: Pupils are equal, round, and reactive to light. Conjunctivae and EOM are normal.   Neck: Neck supple. No JVD present. No tracheal deviation present.   Decreased full ROM   Cardiovascular: Normal rate, regular rhythm and intact distal pulses.   Murmur heard.  Pulmonary/Chest: Effort normal and breath sounds normal.   Abdominal: Soft. Bowel sounds are normal. There is no tenderness.   Musculoskeletal: Normal range of motion. He exhibits no edema.   Neurological: He is alert and oriented to person, place, and time.   Skin: Skin is warm. Capillary refill takes less than 2 seconds. No erythema.   Psychiatric: He has a normal mood and affect. His " behavior is normal. Judgment and thought content normal.   Vitals reviewed.              Diagnoses and health risks identified today and associated recommendations/orders:    1. Encounter for preventive health examination  Chart reviewed. Discussed current medical problems and addressed any outstanding health maintenance. Continue management with PCP.    2. Need for shingles vaccine  Recommended patient obtain 2-series Shingrix vaccine at their local pharmacy.    3. Need for vaccination against Streptococcus pneumoniae  Vaccine unavailable today in clinic. Recommended patient obtain at PCP's office.    4. Hearing loss of right ear, unspecified hearing loss type  Abnormal whisper test; patient unable to repeat number-letter-number combination for right ear. States his wife has been encouraging him to have a hearing evaluation.  - Ambulatory referral/consult to ENT; Future    5. Chronic systolic congestive heart failure  Chronic; stable on medication.  Followed by Cardiology.    6. S/P MVR (mitral valve replacement)  Stable. Followed by Cardiology.    7. CKD (chronic kidney disease) stage 3, GFR 30-59 ml/min  Chronic; stable. Counseled to avoid nephrotoxins. Followed by Nephrology.    8. Benign prostatic hyperplasia, unspecified whether lower urinary tract symptoms present  Chronic; stable on medication. Followed by PCP.    9. Tremor  Chronic; stable. Followed by Neurology.    10. Hypertension, unspecified type  Chronic; well controlled on medication. Followed by PCP.    11. Thrombocytopenia, unspecified  Chronic; stable. Followed by Hem/Onc.    12. B-cell lymphoma, unspecified B-cell lymphoma type, unspecified body region  Chronic; stable. Followed by Hem/Onc.    13. Compression fracture of C6 vertebra with routine healing  S/p C6-C7 ACDF in 10/2019. Currently in therapy. Pain well controlled on current regimen. Followed by Neurosurgery.    14. S/P CABG x 1  Stable on ASA and statin. Followed by  Cardiology.    Provided Rodolfo with a 5-10 year written screening schedule and personal prevention plan. Recommendations were developed using the USPSTF age appropriate recommendations. Education, counseling, and referrals were provided as needed. After Visit Summary printed and given to patient which includes a list of additional screenings\tests needed.    I offered to discuss end of life issues, including information on how to make advance directives that the patient could use to name someone who would make medical decisions on their behalf if they became too ill to make themselves.    ___Patient declined  _X_Patient is interested, I provided paper work and offered to discuss.    Follow up in about 4 weeks (around 3/9/2020) for Follow-up with PCP, Annual Wellness Visit in 1 year.    Nadia Pineda PA-C

## 2020-02-17 ENCOUNTER — TELEPHONE (OUTPATIENT)
Dept: HEMATOLOGY/ONCOLOGY | Facility: CLINIC | Age: 70
End: 2020-02-17

## 2020-02-17 NOTE — TELEPHONE ENCOUNTER
----- Message from Meseret Stark sent at 2/17/2020  1:27 PM CST -----  Contact: pt  Pt would like to be called back regarding lab orfders    Pt can be reached at [phone]

## 2020-02-20 ENCOUNTER — OFFICE VISIT (OUTPATIENT)
Dept: HEMATOLOGY/ONCOLOGY | Facility: CLINIC | Age: 70
End: 2020-02-20
Payer: MEDICARE

## 2020-02-20 ENCOUNTER — LAB VISIT (OUTPATIENT)
Dept: LAB | Facility: HOSPITAL | Age: 70
End: 2020-02-20
Payer: MEDICARE

## 2020-02-20 VITALS
WEIGHT: 174.19 LBS | OXYGEN SATURATION: 100 % | HEIGHT: 70 IN | TEMPERATURE: 98 F | SYSTOLIC BLOOD PRESSURE: 170 MMHG | HEART RATE: 61 BPM | BODY MASS INDEX: 24.94 KG/M2 | DIASTOLIC BLOOD PRESSURE: 77 MMHG | RESPIRATION RATE: 16 BRPM

## 2020-02-20 DIAGNOSIS — D64.9 ANEMIA, UNSPECIFIED TYPE: Primary | ICD-10-CM

## 2020-02-20 DIAGNOSIS — C83.00 SMALL B-CELL LYMPHOMA, UNSPECIFIED BODY REGION: ICD-10-CM

## 2020-02-20 DIAGNOSIS — C85.80 INDOLENT NON-HODGKINS LYMPHOMA: ICD-10-CM

## 2020-02-20 LAB
ALBUMIN SERPL BCP-MCNC: 3.8 G/DL (ref 3.5–5.2)
ALP SERPL-CCNC: 71 U/L (ref 55–135)
ALT SERPL W/O P-5'-P-CCNC: 16 U/L (ref 10–44)
ANION GAP SERPL CALC-SCNC: 6 MMOL/L (ref 8–16)
AST SERPL-CCNC: 18 U/L (ref 10–40)
BASOPHILS # BLD AUTO: 0.03 K/UL (ref 0–0.2)
BASOPHILS NFR BLD: 0.7 % (ref 0–1.9)
BILIRUB SERPL-MCNC: 0.9 MG/DL (ref 0.1–1)
BUN SERPL-MCNC: 24 MG/DL (ref 8–23)
CALCIUM SERPL-MCNC: 9.8 MG/DL (ref 8.7–10.5)
CHLORIDE SERPL-SCNC: 110 MMOL/L (ref 95–110)
CO2 SERPL-SCNC: 28 MMOL/L (ref 23–29)
CREAT SERPL-MCNC: 1.5 MG/DL (ref 0.5–1.4)
DIFFERENTIAL METHOD: ABNORMAL
EOSINOPHIL # BLD AUTO: 0.1 K/UL (ref 0–0.5)
EOSINOPHIL NFR BLD: 2.9 % (ref 0–8)
ERYTHROCYTE [DISTWIDTH] IN BLOOD BY AUTOMATED COUNT: 12.9 % (ref 11.5–14.5)
EST. GFR  (AFRICAN AMERICAN): 54.1 ML/MIN/1.73 M^2
EST. GFR  (NON AFRICAN AMERICAN): 46.8 ML/MIN/1.73 M^2
GLUCOSE SERPL-MCNC: 86 MG/DL (ref 70–110)
HCT VFR BLD AUTO: 36.8 % (ref 40–54)
HGB BLD-MCNC: 11.5 G/DL (ref 14–18)
IMM GRANULOCYTES # BLD AUTO: 0 K/UL (ref 0–0.04)
IMM GRANULOCYTES NFR BLD AUTO: 0 % (ref 0–0.5)
LDH SERPL L TO P-CCNC: 195 U/L (ref 110–260)
LYMPHOCYTES # BLD AUTO: 1.1 K/UL (ref 1–4.8)
LYMPHOCYTES NFR BLD: 24.8 % (ref 18–48)
MCH RBC QN AUTO: 32.2 PG (ref 27–31)
MCHC RBC AUTO-ENTMCNC: 31.3 G/DL (ref 32–36)
MCV RBC AUTO: 103 FL (ref 82–98)
MONOCYTES # BLD AUTO: 0.5 K/UL (ref 0.3–1)
MONOCYTES NFR BLD: 10.3 % (ref 4–15)
NEUTROPHILS # BLD AUTO: 2.8 K/UL (ref 1.8–7.7)
NEUTROPHILS NFR BLD: 61.3 % (ref 38–73)
NRBC BLD-RTO: 0 /100 WBC
PLATELET # BLD AUTO: 124 K/UL (ref 150–350)
PMV BLD AUTO: 10.9 FL (ref 9.2–12.9)
POTASSIUM SERPL-SCNC: 5.2 MMOL/L (ref 3.5–5.1)
PROT SERPL-MCNC: 6.9 G/DL (ref 6–8.4)
RBC # BLD AUTO: 3.57 M/UL (ref 4.6–6.2)
SODIUM SERPL-SCNC: 144 MMOL/L (ref 136–145)
WBC # BLD AUTO: 4.48 K/UL (ref 3.9–12.7)

## 2020-02-20 PROCEDURE — 3077F PR MOST RECENT SYSTOLIC BLOOD PRESSURE >= 140 MM HG: ICD-10-PCS | Mod: HCNC,CPTII,S$GLB, | Performed by: INTERNAL MEDICINE

## 2020-02-20 PROCEDURE — 99999 PR PBB SHADOW E&M-EST. PATIENT-LVL IV: CPT | Mod: PBBFAC,HCNC,, | Performed by: INTERNAL MEDICINE

## 2020-02-20 PROCEDURE — 99214 PR OFFICE/OUTPT VISIT, EST, LEVL IV, 30-39 MIN: ICD-10-PCS | Mod: HCNC,S$GLB,, | Performed by: INTERNAL MEDICINE

## 2020-02-20 PROCEDURE — 99999 PR PBB SHADOW E&M-EST. PATIENT-LVL IV: ICD-10-PCS | Mod: PBBFAC,HCNC,, | Performed by: INTERNAL MEDICINE

## 2020-02-20 PROCEDURE — 3078F PR MOST RECENT DIASTOLIC BLOOD PRESSURE < 80 MM HG: ICD-10-PCS | Mod: HCNC,CPTII,S$GLB, | Performed by: INTERNAL MEDICINE

## 2020-02-20 PROCEDURE — 1159F MED LIST DOCD IN RCRD: CPT | Mod: HCNC,S$GLB,, | Performed by: INTERNAL MEDICINE

## 2020-02-20 PROCEDURE — 1101F PT FALLS ASSESS-DOCD LE1/YR: CPT | Mod: HCNC,CPTII,S$GLB, | Performed by: INTERNAL MEDICINE

## 2020-02-20 PROCEDURE — 80053 COMPREHEN METABOLIC PANEL: CPT | Mod: HCNC

## 2020-02-20 PROCEDURE — 1159F PR MEDICATION LIST DOCUMENTED IN MEDICAL RECORD: ICD-10-PCS | Mod: HCNC,S$GLB,, | Performed by: INTERNAL MEDICINE

## 2020-02-20 PROCEDURE — 1126F PR PAIN SEVERITY QUANTIFIED, NO PAIN PRESENT: ICD-10-PCS | Mod: HCNC,S$GLB,, | Performed by: INTERNAL MEDICINE

## 2020-02-20 PROCEDURE — 1101F PR PT FALLS ASSESS DOC 0-1 FALLS W/OUT INJ PAST YR: ICD-10-PCS | Mod: HCNC,CPTII,S$GLB, | Performed by: INTERNAL MEDICINE

## 2020-02-20 PROCEDURE — 1126F AMNT PAIN NOTED NONE PRSNT: CPT | Mod: HCNC,S$GLB,, | Performed by: INTERNAL MEDICINE

## 2020-02-20 PROCEDURE — 85025 COMPLETE CBC W/AUTO DIFF WBC: CPT | Mod: HCNC

## 2020-02-20 PROCEDURE — 83615 LACTATE (LD) (LDH) ENZYME: CPT | Mod: HCNC

## 2020-02-20 PROCEDURE — 3078F DIAST BP <80 MM HG: CPT | Mod: HCNC,CPTII,S$GLB, | Performed by: INTERNAL MEDICINE

## 2020-02-20 PROCEDURE — 36415 COLL VENOUS BLD VENIPUNCTURE: CPT | Mod: HCNC

## 2020-02-20 PROCEDURE — 99214 OFFICE O/P EST MOD 30 MIN: CPT | Mod: HCNC,S$GLB,, | Performed by: INTERNAL MEDICINE

## 2020-02-20 PROCEDURE — 3077F SYST BP >= 140 MM HG: CPT | Mod: HCNC,CPTII,S$GLB, | Performed by: INTERNAL MEDICINE
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
## 2020-03-09 RX ORDER — MIRTAZAPINE 30 MG/1
TABLET, FILM COATED ORAL
Qty: 90 TABLET | Refills: 3 | Status: SHIPPED | OUTPATIENT
Start: 2020-03-09 | End: 2021-02-03

## 2020-03-12 ENCOUNTER — PATIENT OUTREACH (OUTPATIENT)
Dept: ADMINISTRATIVE | Facility: OTHER | Age: 70
End: 2020-03-12

## 2020-03-16 ENCOUNTER — TELEPHONE (OUTPATIENT)
Dept: OTOLARYNGOLOGY | Facility: CLINIC | Age: 70
End: 2020-03-16

## 2020-03-16 NOTE — TELEPHONE ENCOUNTER
Spoke with  she want's to cancel her 's appointment for Wednesday March 18, 2020 due to Covid 19. She asked if we would call her back when it is safe to come out. 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 I told  we would call her.  thanked me for calling and voice understanding.

## 2020-03-16 NOTE — TELEPHONE ENCOUNTER
----- Message from Ashlie Gallegos sent at 3/16/2020  1:00 PM CDT -----  Contact: Margaret (wife)  Margaret called to reschedule the audiogram and appt with  on 3/18/20. Please call [phone] to discuss.

## 2020-03-18 ENCOUNTER — PATIENT OUTREACH (OUTPATIENT)
Dept: ADMINISTRATIVE | Facility: OTHER | Age: 70
End: 2020-03-18

## 2020-04-20 NOTE — TELEPHONE ENCOUNTER
Pt coming in Monday   To follow up    Please send in Neb machine to [facility]  He has the meds already but no machine    Can he order HH - [facility]  He is getting IV meds at home    Also he was prescribed rozerem 8 mg for sleep and it is not covered by his insurance and will cost $ 400    D/michael home yesterday    Please advise   Pt's 10 minute blood vital signs are: Temp: 95.5, blood pressure: 91/48, pulse: 48, respirations 20 (ventilated) and pt has no signs of any reactions at this time. Will continue to monitor for any reactions.      Iliana Barron RN  04/20/20 0979

## 2020-05-06 ENCOUNTER — CLINICAL SUPPORT (OUTPATIENT)
Dept: FAMILY MEDICINE | Facility: CLINIC | Age: 70
End: 2020-05-06
Payer: MEDICARE

## 2020-05-06 VITALS
HEART RATE: 98 BPM | OXYGEN SATURATION: 96 % | SYSTOLIC BLOOD PRESSURE: 128 MMHG | TEMPERATURE: 99 F | DIASTOLIC BLOOD PRESSURE: 68 MMHG

## 2020-05-06 DIAGNOSIS — I10 HYPERTENSION, UNSPECIFIED TYPE: Primary | ICD-10-CM

## 2020-05-06 NOTE — PROGRESS NOTES
Rodolfo Messina 69 y.o. male is here today for Blood Pressure check.   History of HTN yes.    Review of patient's allergies indicates:   Allergen Reactions    Metoprolol Diarrhea    Shellfish containing products     Augmentin [amoxicillin-pot clavulanate]      Patient felt that it raised BP and requested to have it added as intolerance. Tolerated unasyn and ampicillin.    Ciprofloxacin     Flagyl [metronidazole]     Lisinopril Other (See Comments)     cough    Mysoline [primidone]     Omnicef [cefdinir]      Creatinine   Date Value Ref Range Status   02/20/2020 1.5 (H) 0.5 - 1.4 mg/dL Final   05/26/2016 1.2 mg/dL Final     Sodium   Date Value Ref Range Status   02/20/2020 144 136 - 145 mmol/L Final   05/26/2016 143  Final     Potassium   Date Value Ref Range Status   02/20/2020 5.2 (H) 3.5 - 5.1 mmol/L Final   05/26/2016 4.9  Final   ]  Patient verifies taking blood pressure medications on a regular basis at the same time of the day.     Only medication the pt takes form BP is   norvasc 10 mg 1 daily     Does patient have record of home blood pressure readings no.   Last dose of blood pressure medication was taken at 830 am.  Patient is asymptomatic.     Blood pressure reading after 15 minutes was 128/68, Pulse 98.  Dr. Claudio notified.      Pt here to check BP bc dr sanchez recently increased his BP meds from norvasc 5 mg to norvasc 10 mg

## 2020-06-26 RX ORDER — PANTOPRAZOLE SODIUM 40 MG/1
TABLET, DELAYED RELEASE ORAL
Qty: 90 TABLET | Refills: 5 | Status: SHIPPED | OUTPATIENT
Start: 2020-06-26 | End: 2020-10-13

## 2020-07-01 RX ORDER — GABAPENTIN 100 MG/1
100 CAPSULE ORAL 3 TIMES DAILY
Qty: 90 CAPSULE | Refills: 11 | Status: SHIPPED | OUTPATIENT
Start: 2020-07-01 | End: 2020-07-08 | Stop reason: SDUPTHER

## 2020-07-09 RX ORDER — GABAPENTIN 100 MG/1
100 CAPSULE ORAL 3 TIMES DAILY
Qty: 90 CAPSULE | Refills: 11 | Status: SHIPPED | OUTPATIENT
Start: 2020-07-09 | End: 2020-12-14

## 2020-07-14 ENCOUNTER — OFFICE VISIT (OUTPATIENT)
Dept: NEUROSURGERY | Facility: CLINIC | Age: 70
End: 2020-07-14
Payer: MEDICARE

## 2020-07-14 ENCOUNTER — HOSPITAL ENCOUNTER (OUTPATIENT)
Dept: RADIOLOGY | Facility: HOSPITAL | Age: 70
Discharge: HOME | End: 2020-07-14
Attending: NEUROLOGICAL SURGERY
Payer: MEDICARE

## 2020-07-14 VITALS — TEMPERATURE: 98 F | HEIGHT: 70 IN | BODY MASS INDEX: 24.99 KG/M2

## 2020-07-14 DIAGNOSIS — S12.9XXA: Primary | ICD-10-CM

## 2020-07-14 DIAGNOSIS — Z98.1 STATUS POST CERVICAL SPINAL FUSION: ICD-10-CM

## 2020-07-14 DIAGNOSIS — S12.590D COMPRESSION FRACTURE OF C6 VERTEBRA WITH ROUTINE HEALING, SUBSEQUENT ENCOUNTER: ICD-10-CM

## 2020-07-14 PROCEDURE — 1159F PR MEDICATION LIST DOCUMENTED IN MEDICAL RECORD: ICD-10-PCS | Mod: HCNC,S$GLB,, | Performed by: NEUROLOGICAL SURGERY

## 2020-07-14 PROCEDURE — 99999 PR PBB SHADOW E&M-EST. PATIENT-LVL II: CPT | Mod: PBBFAC,HCNC,, | Performed by: NEUROLOGICAL SURGERY

## 2020-07-14 PROCEDURE — 1126F PR PAIN SEVERITY QUANTIFIED, NO PAIN PRESENT: ICD-10-PCS | Mod: HCNC,S$GLB,, | Performed by: NEUROLOGICAL SURGERY

## 2020-07-14 PROCEDURE — 99214 PR OFFICE/OUTPT VISIT, EST, LEVL IV, 30-39 MIN: ICD-10-PCS | Mod: HCNC,S$GLB,, | Performed by: NEUROLOGICAL SURGERY

## 2020-07-14 PROCEDURE — 3008F PR BODY MASS INDEX (BMI) DOCUMENTED: ICD-10-PCS | Mod: HCNC,CPTII,S$GLB, | Performed by: NEUROLOGICAL SURGERY

## 2020-07-14 PROCEDURE — 1101F PT FALLS ASSESS-DOCD LE1/YR: CPT | Mod: HCNC,CPTII,S$GLB, | Performed by: NEUROLOGICAL SURGERY

## 2020-07-14 PROCEDURE — 1126F AMNT PAIN NOTED NONE PRSNT: CPT | Mod: HCNC,S$GLB,, | Performed by: NEUROLOGICAL SURGERY

## 2020-07-14 PROCEDURE — 1159F MED LIST DOCD IN RCRD: CPT | Mod: HCNC,S$GLB,, | Performed by: NEUROLOGICAL SURGERY

## 2020-07-14 PROCEDURE — 99214 OFFICE O/P EST MOD 30 MIN: CPT | Mod: HCNC,S$GLB,, | Performed by: NEUROLOGICAL SURGERY

## 2020-07-14 PROCEDURE — 1101F PR PT FALLS ASSESS DOC 0-1 FALLS W/OUT INJ PAST YR: ICD-10-PCS | Mod: HCNC,CPTII,S$GLB, | Performed by: NEUROLOGICAL SURGERY

## 2020-07-14 PROCEDURE — 99999 PR PBB SHADOW E&M-EST. PATIENT-LVL II: ICD-10-PCS | Mod: PBBFAC,HCNC,, | Performed by: NEUROLOGICAL SURGERY

## 2020-07-14 PROCEDURE — 72125 CT NECK SPINE W/O DYE: CPT | Mod: 26,HCNC,, | Performed by: RADIOLOGY

## 2020-07-14 PROCEDURE — 3008F BODY MASS INDEX DOCD: CPT | Mod: HCNC,CPTII,S$GLB, | Performed by: NEUROLOGICAL SURGERY

## 2020-07-14 PROCEDURE — 72125 CT NECK SPINE W/O DYE: CPT | Mod: TC,HCNC

## 2020-07-14 PROCEDURE — 72125 CT CERVICAL SPINE WITHOUT CONTRAST: ICD-10-PCS | Mod: 26,HCNC,, | Performed by: RADIOLOGY

## 2020-07-14 RX ORDER — METHOCARBAMOL 750 MG/1
750 TABLET, FILM COATED ORAL
Qty: 90 TABLET | Refills: 2 | Status: SHIPPED | OUTPATIENT
Start: 2020-07-14 | End: 2020-07-24

## 2020-07-14 NOTE — LETTER
July 14, 2020      Deric Claudio MD  [address] [facility] [address]           [facility] Neurosurgery 7th Fl  [address]  [facility] [address]  Phone: [phone]  Fax: [phone]          Patient: Rodolfo Messina   MR Number: [id number]   YOB: 1950   Date of Visit: 7/14/2020       Dear Dr. Deric Claudio:    Thank you for referring Rodolfo Messina to me for evaluation. Attached you will find relevant portions of my assessment and plan of care.    If you have questions, please do not hesitate to call me. I look forward to following Rodolfo Messina along with you.    Sincerely,    Jay Reyes MD    Enclosure  CC:  No Recipients    If you would like to receive this communication electronically, please contact [email or web address] or [phone] to request more information on [facility] Link access.    For providers and/or their staff who would like to refer a patient to [facility], please contact us through our one-stop-shop provider referral line, [facility], at [phone].    If you feel you have received this communication in error or would no longer like to receive these types of communications, please e-mail [email or web address]
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
## 2020-07-17 NOTE — ASSESSMENT & PLAN NOTE
- resolved  - follow up with urology outpatient      -- initiate DASH/TLC diet as medically feasible and add Ensure Enlive TID to optimize po intake and provide an additional 350 kcal, 20g protein per serving./yes

## 2020-08-20 ENCOUNTER — TELEPHONE (OUTPATIENT)
Dept: CARDIOLOGY | Facility: CLINIC | Age: 70
End: 2020-08-20

## 2020-08-20 DIAGNOSIS — I10 ESSENTIAL HYPERTENSION: ICD-10-CM

## 2020-08-20 RX ORDER — AMLODIPINE BESYLATE 5 MG/1
5 TABLET ORAL DAILY
Qty: 90 TABLET | Refills: 3 | Status: SHIPPED | OUTPATIENT
Start: 2020-08-20 | End: 2020-10-13 | Stop reason: SDUPTHER

## 2020-08-20 NOTE — TELEPHONE ENCOUNTER
Spoke with patient wife, scheduled an appointment to see Dr. Lockett.      ----- Message from Lesia Vincent sent at 8/20/2020 11:32 AM CDT -----  Contact: Margaret ( Wife)-[phone]  Type:  Needs Medical Advice    Who Called: Margaret ( Wife)  Reason for Call: regarding scheduling a appt for a Check up  Would the patient rather a call back or a response via [facility]?  Call back  Best Call Back Number: [phone]

## 2020-09-09 ENCOUNTER — TELEPHONE (OUTPATIENT)
Dept: CARDIOLOGY | Facility: CLINIC | Age: 70
End: 2020-09-09

## 2020-09-09 ENCOUNTER — TELEPHONE (OUTPATIENT)
Dept: HEMATOLOGY/ONCOLOGY | Facility: CLINIC | Age: 70
End: 2020-09-09

## 2020-09-09 DIAGNOSIS — C85.10 B-CELL LYMPHOMA, UNSPECIFIED B-CELL LYMPHOMA TYPE, UNSPECIFIED BODY REGION: Primary | ICD-10-CM

## 2020-09-09 NOTE — TELEPHONE ENCOUNTER
Patient missed his appointment with Dr Lockett yesterday.  Rescheduled appointment, confirmed date, time and locations with patient's wife.    ----- Message from Daksha Saldaña sent at 9/9/2020 11:20 AM CDT -----  Regarding: FW: call back  Contact: [phone]    ----- Message -----  From: Nancie Hernandez  Sent: 9/9/2020  10:12 AM CDT  To: Levy JARQUIN Staff  Subject: call back                                        Patient is calling to talk to nurse in regards to see if he can be seen sooner than December and also wants to know if he needs to do Labs and EKG.

## 2020-09-09 NOTE — TELEPHONE ENCOUNTER
----- Message from Nancie Hernandez sent at 9/9/2020 10:22 AM CDT -----  Regarding: call back  Contact: [phone]  Patient is calling to talk to nurse in regards to his Labs for a 6 month follow up.

## 2020-09-16 NOTE — SUBJECTIVE & OBJECTIVE
"Interval HPI:   BG above goal.  On TF 40 cc/hr  No hypoglycemia.  Afebrile.  Npo.     BP (!) 98/55   Pulse 79   Temp 98.7 °F (37.1 °C) (Oral)   Resp (!) 28   Ht 5' 10" (1.778 m)   Wt 68.5 kg (151 lb 0.2 oz)   SpO2 95%   BMI 21.67 kg/m²     Labs Reviewed and Include      Recent Labs  Lab 05/31/18  0835   *   CALCIUM 10.2   ALBUMIN 3.3*   PROT 7.0   *   K 4.2  4.2   CO2 30*   *   *   CREATININE 2.1*   ALKPHOS 111   ALT 17   AST 52*   BILITOT 1.7*     Lab Results   Component Value Date    WBC 6.86 05/31/2018    HGB 7.5 (L) 05/31/2018    HCT 23.6 (L) 05/31/2018    MCV 96 05/31/2018    PLT 82 (L) 05/31/2018     No results for input(s): TSH, FREET4 in the last 168 hours.  Lab Results   Component Value Date    HGBA1C 5.4 02/22/2018       Nutritional status:   Body mass index is 21.67 kg/m².  Lab Results   Component Value Date    ALBUMIN 3.3 (L) 05/31/2018    ALBUMIN 3.0 (L) 05/31/2018    ALBUMIN 3.0 (L) 05/30/2018     Lab Results   Component Value Date    PREALBUMIN 12 (L) 02/22/2018       Estimated Creatinine Clearance: 33.1 mL/min (A) (based on SCr of 2.1 mg/dL (H)).    Accu-Checks  Recent Labs      05/30/18 2032 05/30/18   2215  05/31/18   0408  05/31/18   0814   POCTGLUCOSE  220*  170*  229*  192*       Current Medications and/or Treatments Impacting Glycemic Control  Immunotherapy:  Immunosuppressants     None        Steroids:   Hormones     None        Pressors:    Autonomic Drugs     None        Previously novolog 2 units q4h  Low dose correction scale.  Hyperglycemia/Diabetes Medications: Antihyperglycemics     Start     Stop Route Frequency Ordered    06/01/18 0800  insulin aspart U-100 pen 3 Units      -- SubQ Every 24 hours (non-standard times) 05/31/18 1035    06/01/18 0400  insulin aspart U-100 pen 3 Units      -- SubQ Every 24 hours (non-standard times) 05/31/18 1035    05/31/18 2000  insulin aspart U-100 pen 3 Units      -- SubQ Every 24 hours (non-standard times) " 05/31/18 1035    05/31/18 1600  insulin aspart U-100 pen 3 Units      -- SubQ Every 24 hours (non-standard times) 05/31/18 1035    05/31/18 1200  insulin aspart U-100 pen 3 Units      -- SubQ Every 24 hours (non-standard times) 05/31/18 1035    05/31/18 0000  insulin aspart U-100 pen 2 Units      -- SubQ Every 24 hours (non-standard times) 05/30/18 1810    05/30/18 1703  insulin aspart U-100 pen 0-5 Units      -- SubQ Every 4 hours PRN 05/30/18 1705         details…

## 2020-09-29 ENCOUNTER — OFFICE VISIT (OUTPATIENT)
Dept: HEMATOLOGY/ONCOLOGY | Facility: CLINIC | Age: 70
End: 2020-09-29
Payer: MEDICARE

## 2020-09-29 ENCOUNTER — LAB VISIT (OUTPATIENT)
Dept: LAB | Facility: HOSPITAL | Age: 70
End: 2020-09-29
Payer: MEDICARE

## 2020-09-29 VITALS
DIASTOLIC BLOOD PRESSURE: 67 MMHG | TEMPERATURE: 99 F | RESPIRATION RATE: 16 BRPM | BODY MASS INDEX: 25.48 KG/M2 | HEIGHT: 70 IN | WEIGHT: 178 LBS | OXYGEN SATURATION: 96 % | SYSTOLIC BLOOD PRESSURE: 158 MMHG | HEART RATE: 77 BPM

## 2020-09-29 DIAGNOSIS — C85.10 INDOLENT B-CELL LYMPHOMA: Primary | ICD-10-CM

## 2020-09-29 DIAGNOSIS — C85.10 B-CELL LYMPHOMA, UNSPECIFIED B-CELL LYMPHOMA TYPE, UNSPECIFIED BODY REGION: ICD-10-CM

## 2020-09-29 LAB
ALBUMIN SERPL BCP-MCNC: 3.9 G/DL (ref 3.5–5.2)
ALP SERPL-CCNC: 67 U/L (ref 55–135)
ALT SERPL W/O P-5'-P-CCNC: 14 U/L (ref 10–44)
ANION GAP SERPL CALC-SCNC: 9 MMOL/L (ref 8–16)
AST SERPL-CCNC: 17 U/L (ref 10–40)
BASOPHILS # BLD AUTO: 0.02 K/UL (ref 0–0.2)
BASOPHILS NFR BLD: 0.5 % (ref 0–1.9)
BILIRUB SERPL-MCNC: 0.9 MG/DL (ref 0.1–1)
BUN SERPL-MCNC: 22 MG/DL (ref 8–23)
CALCIUM SERPL-MCNC: 9.1 MG/DL (ref 8.7–10.5)
CHLORIDE SERPL-SCNC: 113 MMOL/L (ref 95–110)
CO2 SERPL-SCNC: 24 MMOL/L (ref 23–29)
CREAT SERPL-MCNC: 1.5 MG/DL (ref 0.5–1.4)
DIFFERENTIAL METHOD: ABNORMAL
EOSINOPHIL # BLD AUTO: 0.1 K/UL (ref 0–0.5)
EOSINOPHIL NFR BLD: 2.3 % (ref 0–8)
ERYTHROCYTE [DISTWIDTH] IN BLOOD BY AUTOMATED COUNT: 12.9 % (ref 11.5–14.5)
EST. GFR  (AFRICAN AMERICAN): 53.8 ML/MIN/1.73 M^2
EST. GFR  (NON AFRICAN AMERICAN): 46.5 ML/MIN/1.73 M^2
GLUCOSE SERPL-MCNC: 89 MG/DL (ref 70–110)
HCT VFR BLD AUTO: 35.3 % (ref 40–54)
HGB BLD-MCNC: 11.3 G/DL (ref 14–18)
IMM GRANULOCYTES # BLD AUTO: 0.01 K/UL (ref 0–0.04)
IMM GRANULOCYTES NFR BLD AUTO: 0.2 % (ref 0–0.5)
LDH SERPL L TO P-CCNC: 192 U/L (ref 110–260)
LYMPHOCYTES # BLD AUTO: 0.9 K/UL (ref 1–4.8)
LYMPHOCYTES NFR BLD: 20.7 % (ref 18–48)
MCH RBC QN AUTO: 32.9 PG (ref 27–31)
MCHC RBC AUTO-ENTMCNC: 32 G/DL (ref 32–36)
MCV RBC AUTO: 103 FL (ref 82–98)
MONOCYTES # BLD AUTO: 0.4 K/UL (ref 0.3–1)
MONOCYTES NFR BLD: 9.6 % (ref 4–15)
NEUTROPHILS # BLD AUTO: 2.9 K/UL (ref 1.8–7.7)
NEUTROPHILS NFR BLD: 66.7 % (ref 38–73)
NRBC BLD-RTO: 0 /100 WBC
PLATELET # BLD AUTO: 124 K/UL (ref 150–350)
PMV BLD AUTO: 10.9 FL (ref 9.2–12.9)
POTASSIUM SERPL-SCNC: 5.1 MMOL/L (ref 3.5–5.1)
PROT SERPL-MCNC: 6.5 G/DL (ref 6–8.4)
RBC # BLD AUTO: 3.43 M/UL (ref 4.6–6.2)
SODIUM SERPL-SCNC: 146 MMOL/L (ref 136–145)
WBC # BLD AUTO: 4.29 K/UL (ref 3.9–12.7)

## 2020-09-29 PROCEDURE — 99999 PR PBB SHADOW E&M-EST. PATIENT-LVL IV: CPT | Mod: PBBFAC,HCNC,, | Performed by: INTERNAL MEDICINE

## 2020-09-29 PROCEDURE — 3077F SYST BP >= 140 MM HG: CPT | Mod: HCNC,CPTII,S$GLB, | Performed by: INTERNAL MEDICINE

## 2020-09-29 PROCEDURE — 99214 OFFICE O/P EST MOD 30 MIN: CPT | Mod: HCNC,S$GLB,, | Performed by: INTERNAL MEDICINE

## 2020-09-29 PROCEDURE — 1126F AMNT PAIN NOTED NONE PRSNT: CPT | Mod: HCNC,S$GLB,, | Performed by: INTERNAL MEDICINE

## 2020-09-29 PROCEDURE — 3078F PR MOST RECENT DIASTOLIC BLOOD PRESSURE < 80 MM HG: ICD-10-PCS | Mod: HCNC,CPTII,S$GLB, | Performed by: INTERNAL MEDICINE

## 2020-09-29 PROCEDURE — 99214 PR OFFICE/OUTPT VISIT, EST, LEVL IV, 30-39 MIN: ICD-10-PCS | Mod: HCNC,S$GLB,, | Performed by: INTERNAL MEDICINE

## 2020-09-29 PROCEDURE — 1101F PR PT FALLS ASSESS DOC 0-1 FALLS W/OUT INJ PAST YR: ICD-10-PCS | Mod: HCNC,CPTII,S$GLB, | Performed by: INTERNAL MEDICINE

## 2020-09-29 PROCEDURE — 3008F BODY MASS INDEX DOCD: CPT | Mod: HCNC,CPTII,S$GLB, | Performed by: INTERNAL MEDICINE

## 2020-09-29 PROCEDURE — 85025 COMPLETE CBC W/AUTO DIFF WBC: CPT | Mod: HCNC

## 2020-09-29 PROCEDURE — 83615 LACTATE (LD) (LDH) ENZYME: CPT | Mod: HCNC

## 2020-09-29 PROCEDURE — 99999 PR PBB SHADOW E&M-EST. PATIENT-LVL IV: ICD-10-PCS | Mod: PBBFAC,HCNC,, | Performed by: INTERNAL MEDICINE

## 2020-09-29 PROCEDURE — 3078F DIAST BP <80 MM HG: CPT | Mod: HCNC,CPTII,S$GLB, | Performed by: INTERNAL MEDICINE

## 2020-09-29 PROCEDURE — 1126F PR PAIN SEVERITY QUANTIFIED, NO PAIN PRESENT: ICD-10-PCS | Mod: HCNC,S$GLB,, | Performed by: INTERNAL MEDICINE

## 2020-09-29 PROCEDURE — 1101F PT FALLS ASSESS-DOCD LE1/YR: CPT | Mod: HCNC,CPTII,S$GLB, | Performed by: INTERNAL MEDICINE

## 2020-09-29 PROCEDURE — 3008F PR BODY MASS INDEX (BMI) DOCUMENTED: ICD-10-PCS | Mod: HCNC,CPTII,S$GLB, | Performed by: INTERNAL MEDICINE

## 2020-09-29 PROCEDURE — 80053 COMPREHEN METABOLIC PANEL: CPT | Mod: HCNC

## 2020-09-29 PROCEDURE — 36415 COLL VENOUS BLD VENIPUNCTURE: CPT | Mod: HCNC

## 2020-09-29 PROCEDURE — 1159F MED LIST DOCD IN RCRD: CPT | Mod: HCNC,S$GLB,, | Performed by: INTERNAL MEDICINE

## 2020-09-29 PROCEDURE — 1159F PR MEDICATION LIST DOCUMENTED IN MEDICAL RECORD: ICD-10-PCS | Mod: HCNC,S$GLB,, | Performed by: INTERNAL MEDICINE

## 2020-09-29 PROCEDURE — 3077F PR MOST RECENT SYSTOLIC BLOOD PRESSURE >= 140 MM HG: ICD-10-PCS | Mod: HCNC,CPTII,S$GLB, | Performed by: INTERNAL MEDICINE
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
## 2020-10-12 ENCOUNTER — PATIENT OUTREACH (OUTPATIENT)
Dept: ADMINISTRATIVE | Facility: OTHER | Age: 70
End: 2020-10-12

## 2020-10-12 NOTE — PROGRESS NOTES
Health Maintenance Due   Topic Date Due    Shingles Vaccine (1 of 2) 06/15/2000    Pneumococcal Vaccine (65+ High/Highest Risk) (1 of 2 - PCV13) 06/15/2015    Influenza Vaccine (1) 08/01/2020     Updates were requested from care everywhere.  Chart was reviewed for overdue Proactive [facility] Encounters (ALFONSO) topics (CRS, Breast Cancer Screening, Eye exam)  Health Maintenance has been updated.  LINKS immunization registry triggered.  Immunizations were reconciled.

## 2020-10-13 ENCOUNTER — OFFICE VISIT (OUTPATIENT)
Dept: CARDIOLOGY | Facility: CLINIC | Age: 70
End: 2020-10-13
Payer: MEDICARE

## 2020-10-13 VITALS
WEIGHT: 177.94 LBS | HEIGHT: 70 IN | BODY MASS INDEX: 25.47 KG/M2 | SYSTOLIC BLOOD PRESSURE: 132 MMHG | DIASTOLIC BLOOD PRESSURE: 75 MMHG | HEART RATE: 77 BPM

## 2020-10-13 DIAGNOSIS — I10 ESSENTIAL HYPERTENSION: ICD-10-CM

## 2020-10-13 DIAGNOSIS — N40.1 BENIGN PROSTATIC HYPERPLASIA WITH LOWER URINARY TRACT SYMPTOMS, SYMPTOM DETAILS UNSPECIFIED: ICD-10-CM

## 2020-10-13 DIAGNOSIS — Z95.1 HX OF CABG: ICD-10-CM

## 2020-10-13 DIAGNOSIS — I50.22 CHRONIC SYSTOLIC CONGESTIVE HEART FAILURE: ICD-10-CM

## 2020-10-13 DIAGNOSIS — Z86.79 H/O BACTERIAL ENDOCARDITIS: ICD-10-CM

## 2020-10-13 DIAGNOSIS — Z95.2 S/P MVR (MITRAL VALVE REPLACEMENT): Primary | ICD-10-CM

## 2020-10-13 DIAGNOSIS — Z86.79 H/O ATRIAL FLUTTER: ICD-10-CM

## 2020-10-13 DIAGNOSIS — N18.30 STAGE 3 CHRONIC KIDNEY DISEASE, UNSPECIFIED WHETHER STAGE 3A OR 3B CKD: ICD-10-CM

## 2020-10-13 DIAGNOSIS — Z95.1 S/P CABG X 1: ICD-10-CM

## 2020-10-13 DIAGNOSIS — S12.590D COMPRESSION FRACTURE OF C6 VERTEBRA WITH ROUTINE HEALING: ICD-10-CM

## 2020-10-13 PROCEDURE — 3008F PR BODY MASS INDEX (BMI) DOCUMENTED: ICD-10-PCS | Mod: HCNC,CPTII,S$GLB, | Performed by: INTERNAL MEDICINE

## 2020-10-13 PROCEDURE — 99214 OFFICE O/P EST MOD 30 MIN: CPT | Mod: 25,HCNC,S$GLB, | Performed by: INTERNAL MEDICINE

## 2020-10-13 PROCEDURE — 1101F PR PT FALLS ASSESS DOC 0-1 FALLS W/OUT INJ PAST YR: ICD-10-PCS | Mod: HCNC,CPTII,S$GLB, | Performed by: INTERNAL MEDICINE

## 2020-10-13 PROCEDURE — 93000 EKG 12-LEAD: ICD-10-PCS | Mod: HCNC,S$GLB,, | Performed by: INTERNAL MEDICINE

## 2020-10-13 PROCEDURE — 99999 PR PBB SHADOW E&M-EST. PATIENT-LVL III: CPT | Mod: PBBFAC,HCNC,, | Performed by: INTERNAL MEDICINE

## 2020-10-13 PROCEDURE — 3008F BODY MASS INDEX DOCD: CPT | Mod: HCNC,CPTII,S$GLB, | Performed by: INTERNAL MEDICINE

## 2020-10-13 PROCEDURE — 99999 PR PBB SHADOW E&M-EST. PATIENT-LVL III: ICD-10-PCS | Mod: PBBFAC,HCNC,, | Performed by: INTERNAL MEDICINE

## 2020-10-13 PROCEDURE — 3075F PR MOST RECENT SYSTOLIC BLOOD PRESS GE 130-139MM HG: ICD-10-PCS | Mod: HCNC,CPTII,S$GLB, | Performed by: INTERNAL MEDICINE

## 2020-10-13 PROCEDURE — 93000 ELECTROCARDIOGRAM COMPLETE: CPT | Mod: HCNC,S$GLB,, | Performed by: INTERNAL MEDICINE

## 2020-10-13 PROCEDURE — 1101F PT FALLS ASSESS-DOCD LE1/YR: CPT | Mod: HCNC,CPTII,S$GLB, | Performed by: INTERNAL MEDICINE

## 2020-10-13 PROCEDURE — 1159F MED LIST DOCD IN RCRD: CPT | Mod: HCNC,S$GLB,, | Performed by: INTERNAL MEDICINE

## 2020-10-13 PROCEDURE — 3075F SYST BP GE 130 - 139MM HG: CPT | Mod: HCNC,CPTII,S$GLB, | Performed by: INTERNAL MEDICINE

## 2020-10-13 PROCEDURE — 3078F PR MOST RECENT DIASTOLIC BLOOD PRESSURE < 80 MM HG: ICD-10-PCS | Mod: HCNC,CPTII,S$GLB, | Performed by: INTERNAL MEDICINE

## 2020-10-13 PROCEDURE — 3078F DIAST BP <80 MM HG: CPT | Mod: HCNC,CPTII,S$GLB, | Performed by: INTERNAL MEDICINE

## 2020-10-13 PROCEDURE — 1159F PR MEDICATION LIST DOCUMENTED IN MEDICAL RECORD: ICD-10-PCS | Mod: HCNC,S$GLB,, | Performed by: INTERNAL MEDICINE

## 2020-10-13 PROCEDURE — 99214 PR OFFICE/OUTPT VISIT, EST, LEVL IV, 30-39 MIN: ICD-10-PCS | Mod: 25,HCNC,S$GLB, | Performed by: INTERNAL MEDICINE

## 2020-10-13 RX ORDER — AZITHROMYCIN 500 MG/1
500 TABLET, FILM COATED ORAL DAILY
Qty: 5 TABLET | Refills: 3 | Status: SHIPPED | OUTPATIENT
Start: 2020-10-13 | End: 2022-06-21 | Stop reason: SDUPTHER

## 2020-10-13 RX ORDER — AMLODIPINE BESYLATE 5 MG/1
5 TABLET ORAL 2 TIMES DAILY
Qty: 180 TABLET | Refills: 3 | Status: SHIPPED | OUTPATIENT
Start: 2020-10-13 | End: 2021-08-26
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
## 2021-02-03 RX ORDER — MIRTAZAPINE 30 MG/1
TABLET, FILM COATED ORAL
Qty: 90 TABLET | Refills: 3 | Status: SHIPPED | OUTPATIENT
Start: 2021-02-03 | End: 2021-10-27

## 2021-06-16 ENCOUNTER — TELEPHONE (OUTPATIENT)
Dept: OTOLARYNGOLOGY | Facility: CLINIC | Age: 71
End: 2021-06-16

## 2021-06-23 ENCOUNTER — OFFICE VISIT (OUTPATIENT)
Dept: OTOLARYNGOLOGY | Facility: CLINIC | Age: 71
End: 2021-06-23
Payer: MEDICARE

## 2021-06-23 ENCOUNTER — CLINICAL SUPPORT (OUTPATIENT)
Dept: OTOLARYNGOLOGY | Facility: CLINIC | Age: 71
End: 2021-06-23
Payer: MEDICARE

## 2021-06-23 VITALS
WEIGHT: 181.88 LBS | BODY MASS INDEX: 26.04 KG/M2 | DIASTOLIC BLOOD PRESSURE: 77 MMHG | HEART RATE: 76 BPM | SYSTOLIC BLOOD PRESSURE: 138 MMHG | HEIGHT: 70 IN

## 2021-06-23 DIAGNOSIS — H61.23 BILATERAL IMPACTED CERUMEN: ICD-10-CM

## 2021-06-23 DIAGNOSIS — H93.13 TINNITUS AURIUM, BILATERAL: ICD-10-CM

## 2021-06-23 DIAGNOSIS — H90.3 SENSORINEURAL HEARING LOSS (SNHL), BILATERAL: Primary | ICD-10-CM

## 2021-06-23 PROCEDURE — 92557 COMPREHENSIVE HEARING TEST: CPT | Mod: S$GLB,,, | Performed by: AUDIOLOGIST-HEARING AID FITTER

## 2021-06-23 PROCEDURE — 69210 REMOVE IMPACTED EAR WAX UNI: CPT | Mod: S$GLB,,, | Performed by: OTOLARYNGOLOGY

## 2021-06-23 PROCEDURE — 92567 PR TYMPA2METRY: ICD-10-PCS | Mod: S$GLB,,, | Performed by: AUDIOLOGIST-HEARING AID FITTER

## 2021-06-23 PROCEDURE — 1126F AMNT PAIN NOTED NONE PRSNT: CPT | Mod: S$GLB,,, | Performed by: OTOLARYNGOLOGY

## 2021-06-23 PROCEDURE — 99204 PR OFFICE/OUTPT VISIT, NEW, LEVL IV, 45-59 MIN: ICD-10-PCS | Mod: 25,S$GLB,, | Performed by: OTOLARYNGOLOGY

## 2021-06-23 PROCEDURE — 69210 PR REMOVAL IMPACTED CERUMEN REQUIRING INSTRUMENTATION, UNILATERAL: ICD-10-PCS | Mod: S$GLB,,, | Performed by: OTOLARYNGOLOGY

## 2021-06-23 PROCEDURE — 99999 PR PBB SHADOW E&M-EST. PATIENT-LVL IV: ICD-10-PCS | Mod: PBBFAC,,, | Performed by: OTOLARYNGOLOGY

## 2021-06-23 PROCEDURE — 99999 PR PBB SHADOW E&M-EST. PATIENT-LVL IV: CPT | Mod: PBBFAC,,, | Performed by: OTOLARYNGOLOGY

## 2021-06-23 PROCEDURE — 3008F PR BODY MASS INDEX (BMI) DOCUMENTED: ICD-10-PCS | Mod: CPTII,S$GLB,, | Performed by: OTOLARYNGOLOGY

## 2021-06-23 PROCEDURE — 92567 TYMPANOMETRY: CPT | Mod: S$GLB,,, | Performed by: AUDIOLOGIST-HEARING AID FITTER

## 2021-06-23 PROCEDURE — 1159F MED LIST DOCD IN RCRD: CPT | Mod: S$GLB,,, | Performed by: OTOLARYNGOLOGY

## 2021-06-23 PROCEDURE — 99204 OFFICE O/P NEW MOD 45 MIN: CPT | Mod: 25,S$GLB,, | Performed by: OTOLARYNGOLOGY

## 2021-06-23 PROCEDURE — 3008F BODY MASS INDEX DOCD: CPT | Mod: CPTII,S$GLB,, | Performed by: OTOLARYNGOLOGY

## 2021-06-23 PROCEDURE — 92557 PR COMPREHENSIVE HEARING TEST: ICD-10-PCS | Mod: S$GLB,,, | Performed by: AUDIOLOGIST-HEARING AID FITTER

## 2021-06-23 PROCEDURE — 1126F PR PAIN SEVERITY QUANTIFIED, NO PAIN PRESENT: ICD-10-PCS | Mod: S$GLB,,, | Performed by: OTOLARYNGOLOGY

## 2021-06-23 PROCEDURE — 1159F PR MEDICATION LIST DOCUMENTED IN MEDICAL RECORD: ICD-10-PCS | Mod: S$GLB,,, | Performed by: OTOLARYNGOLOGY

## 2021-06-23 RX ORDER — GENTAMICIN SULFATE 3 MG/ML
SOLUTION/ DROPS OPHTHALMIC
COMMUNITY
Start: 2021-06-14 | End: 2024-03-20

## 2021-06-23 RX ORDER — ATORVASTATIN CALCIUM 40 MG/1
TABLET, FILM COATED ORAL
COMMUNITY
Start: 2021-04-06 | End: 2021-07-07

## 2021-07-27 ENCOUNTER — TELEPHONE (OUTPATIENT)
Dept: CARDIOLOGY | Facility: CLINIC | Age: 71
End: 2021-07-27

## 2021-07-27 DIAGNOSIS — D69.6 THROMBOCYTOPENIA, UNSPECIFIED: ICD-10-CM

## 2021-07-27 DIAGNOSIS — Z95.2 S/P MVR (MITRAL VALVE REPLACEMENT): ICD-10-CM

## 2021-07-27 DIAGNOSIS — Z95.1 S/P CABG X 1: ICD-10-CM

## 2021-07-27 DIAGNOSIS — N18.30 STAGE 3 CHRONIC KIDNEY DISEASE, UNSPECIFIED WHETHER STAGE 3A OR 3B CKD: Primary | ICD-10-CM

## 2021-07-27 DIAGNOSIS — I10 ESSENTIAL HYPERTENSION: Chronic | ICD-10-CM

## 2021-07-28 ENCOUNTER — TELEPHONE (OUTPATIENT)
Dept: CARDIOLOGY | Facility: CLINIC | Age: 71
End: 2021-07-28

## 2021-07-30 ENCOUNTER — HOSPITAL ENCOUNTER (OUTPATIENT)
Dept: CARDIOLOGY | Facility: HOSPITAL | Age: 71
Discharge: HOME | End: 2021-07-30
Attending: INTERNAL MEDICINE
Payer: MEDICARE

## 2021-07-30 PROCEDURE — 93306 TTE W/DOPPLER COMPLETE: CPT | Mod: PO

## 2021-07-30 PROCEDURE — 93306 TTE W/DOPPLER COMPLETE: CPT | Mod: 26,,, | Performed by: INTERNAL MEDICINE

## 2021-07-30 PROCEDURE — 93306 ECHO (CUPID ONLY): ICD-10-PCS | Mod: 26,,, | Performed by: INTERNAL MEDICINE

## 2021-08-02 ENCOUNTER — PATIENT OUTREACH (OUTPATIENT)
Dept: ADMINISTRATIVE | Facility: OTHER | Age: 71
End: 2021-08-02

## 2021-08-02 LAB
AORTIC ROOT ANNULUS: 3.23 CM
AV INDEX (PROSTH): 0.77
AV MEAN GRADIENT: 4 MMHG
AV PEAK GRADIENT: 8 MMHG
AV VALVE AREA: 3.87 CM2
AV VELOCITY RATIO: 0.84
CV ECHO LV RWT: 0.46 CM
DOP CALC AO PEAK VEL: 1.39 M/S
DOP CALC AO VTI: 33.97 CM
DOP CALC LVOT AREA: 5 CM2
DOP CALC LVOT DIAMETER: 2.53 CM
DOP CALC LVOT PEAK VEL: 1.17 M/S
DOP CALC LVOT STROKE VOLUME: 131.5 CM3
DOP CALCLVOT PEAK VEL VTI: 26.17 CM
E WAVE DECELERATION TIME: 433.93 MSEC
E/A RATIO: 1.06
E/E' RATIO: 26.46 M/S
ECHO LV POSTERIOR WALL: 1.2 CM (ref 0.6–1.1)
EJECTION FRACTION: 50 %
FRACTIONAL SHORTENING: 27 % (ref 28–44)
INTERVENTRICULAR SEPTUM: 1.3 CM (ref 0.6–1.1)
LA MAJOR: 5.46 CM
LA MINOR: 4.94 CM
LA WIDTH: 5.22 CM
LEFT ATRIUM SIZE: 5.5 CM
LEFT ATRIUM VOLUME MOD: 110.28 CM3
LEFT ATRIUM VOLUME: 126.58 CM3
LEFT INTERNAL DIMENSION IN SYSTOLE: 3.81 CM (ref 2.1–4)
LEFT VENTRICLE DIASTOLIC VOLUME: 131.87 ML
LEFT VENTRICLE SYSTOLIC VOLUME: 62.48 ML
LEFT VENTRICULAR INTERNAL DIMENSION IN DIASTOLE: 5.24 CM (ref 3.5–6)
LEFT VENTRICULAR MASS: 266.68 G
LV LATERAL E/E' RATIO: 17.2 M/S
LV SEPTAL E/E' RATIO: 57.33 M/S
MV A" WAVE DURATION": 11.07 MSEC
MV PEAK A VEL: 1.63 M/S
MV PEAK E VEL: 1.72 M/S
MV PEAK GRADIENT: 11 MMHG
MV STENOSIS PRESSURE HALF TIME: 125.84 MS
MV VALVE AREA P 1/2 METHOD: 1.75 CM2
PISA MRMAX VEL: 0.01 M/S
PISA TR MAX VEL: 2.9 M/S
PULM VEIN S/D RATIO: 1.48
PV PEAK D VEL: 0.21 M/S
PV PEAK S VEL: 0.31 M/S
PV PEAK VELOCITY: 0.96 CM/S
RA MAJOR: 4.58 CM
RA PRESSURE: 8 MMHG
RA WIDTH: 2.48 CM
RIGHT VENTRICULAR END-DIASTOLIC DIMENSION: 4.69 CM
SINUS: 2.82 CM
TDI LATERAL: 0.1 M/S
TDI SEPTAL: 0.03 M/S
TDI: 0.07 M/S
TR MAX PG: 34 MMHG
TRICUSPID ANNULAR PLANE SYSTOLIC EXCURSION: 1.32 CM
TV REST PULMONARY ARTERY PRESSURE: 42 MMHG

## 2021-08-03 ENCOUNTER — OFFICE VISIT (OUTPATIENT)
Dept: CARDIOLOGY | Facility: CLINIC | Age: 71
End: 2021-08-03
Payer: MEDICARE

## 2021-08-03 VITALS
WEIGHT: 182.31 LBS | HEIGHT: 70 IN | HEART RATE: 75 BPM | BODY MASS INDEX: 26.1 KG/M2 | SYSTOLIC BLOOD PRESSURE: 126 MMHG | OXYGEN SATURATION: 96 % | DIASTOLIC BLOOD PRESSURE: 67 MMHG

## 2021-08-03 DIAGNOSIS — Z86.79 H/O BACTERIAL ENDOCARDITIS: ICD-10-CM

## 2021-08-03 DIAGNOSIS — I10 ESSENTIAL HYPERTENSION: Chronic | ICD-10-CM

## 2021-08-03 DIAGNOSIS — Z95.1 S/P CABG X 1: ICD-10-CM

## 2021-08-03 DIAGNOSIS — Z95.2 S/P MVR (MITRAL VALVE REPLACEMENT): ICD-10-CM

## 2021-08-03 DIAGNOSIS — Z86.79 H/O ATRIAL FLUTTER: Primary | ICD-10-CM

## 2021-08-03 PROCEDURE — 3074F SYST BP LT 130 MM HG: CPT | Mod: CPTII,S$GLB,, | Performed by: INTERNAL MEDICINE

## 2021-08-03 PROCEDURE — 99999 PR PBB SHADOW E&M-EST. PATIENT-LVL III: CPT | Mod: PBBFAC,,, | Performed by: INTERNAL MEDICINE

## 2021-08-03 PROCEDURE — 3078F DIAST BP <80 MM HG: CPT | Mod: CPTII,S$GLB,, | Performed by: INTERNAL MEDICINE

## 2021-08-03 PROCEDURE — 3008F BODY MASS INDEX DOCD: CPT | Mod: CPTII,S$GLB,, | Performed by: INTERNAL MEDICINE

## 2021-08-03 PROCEDURE — 3288F FALL RISK ASSESSMENT DOCD: CPT | Mod: CPTII,S$GLB,, | Performed by: INTERNAL MEDICINE

## 2021-08-03 PROCEDURE — 99499 UNLISTED E&M SERVICE: CPT | Mod: HCNC,S$GLB,, | Performed by: INTERNAL MEDICINE

## 2021-08-03 PROCEDURE — 3288F PR FALLS RISK ASSESSMENT DOCUMENTED: ICD-10-PCS | Mod: CPTII,S$GLB,, | Performed by: INTERNAL MEDICINE

## 2021-08-03 PROCEDURE — 3008F PR BODY MASS INDEX (BMI) DOCUMENTED: ICD-10-PCS | Mod: CPTII,S$GLB,, | Performed by: INTERNAL MEDICINE

## 2021-08-03 PROCEDURE — 1101F PR PT FALLS ASSESS DOC 0-1 FALLS W/OUT INJ PAST YR: ICD-10-PCS | Mod: CPTII,S$GLB,, | Performed by: INTERNAL MEDICINE

## 2021-08-03 PROCEDURE — 1160F RVW MEDS BY RX/DR IN RCRD: CPT | Mod: CPTII,S$GLB,, | Performed by: INTERNAL MEDICINE

## 2021-08-03 PROCEDURE — 1126F AMNT PAIN NOTED NONE PRSNT: CPT | Mod: CPTII,S$GLB,, | Performed by: INTERNAL MEDICINE

## 2021-08-03 PROCEDURE — 1101F PT FALLS ASSESS-DOCD LE1/YR: CPT | Mod: CPTII,S$GLB,, | Performed by: INTERNAL MEDICINE

## 2021-08-03 PROCEDURE — 1159F MED LIST DOCD IN RCRD: CPT | Mod: CPTII,S$GLB,, | Performed by: INTERNAL MEDICINE

## 2021-08-03 PROCEDURE — 3074F PR MOST RECENT SYSTOLIC BLOOD PRESSURE < 130 MM HG: ICD-10-PCS | Mod: CPTII,S$GLB,, | Performed by: INTERNAL MEDICINE

## 2021-08-03 PROCEDURE — 99499 RISK ADDL DX/OHS AUDIT: ICD-10-PCS | Mod: HCNC,S$GLB,, | Performed by: INTERNAL MEDICINE

## 2021-08-03 PROCEDURE — 1126F PR PAIN SEVERITY QUANTIFIED, NO PAIN PRESENT: ICD-10-PCS | Mod: CPTII,S$GLB,, | Performed by: INTERNAL MEDICINE

## 2021-08-03 PROCEDURE — 3078F PR MOST RECENT DIASTOLIC BLOOD PRESSURE < 80 MM HG: ICD-10-PCS | Mod: CPTII,S$GLB,, | Performed by: INTERNAL MEDICINE

## 2021-08-03 PROCEDURE — 1159F PR MEDICATION LIST DOCUMENTED IN MEDICAL RECORD: ICD-10-PCS | Mod: CPTII,S$GLB,, | Performed by: INTERNAL MEDICINE

## 2021-08-03 PROCEDURE — 99214 PR OFFICE/OUTPT VISIT, EST, LEVL IV, 30-39 MIN: ICD-10-PCS | Mod: 25,S$GLB,, | Performed by: INTERNAL MEDICINE

## 2021-08-03 PROCEDURE — 93000 EKG 12-LEAD: ICD-10-PCS | Mod: S$GLB,,, | Performed by: INTERNAL MEDICINE

## 2021-08-03 PROCEDURE — 93000 ELECTROCARDIOGRAM COMPLETE: CPT | Mod: S$GLB,,, | Performed by: INTERNAL MEDICINE

## 2021-08-03 PROCEDURE — 99999 PR PBB SHADOW E&M-EST. PATIENT-LVL III: ICD-10-PCS | Mod: PBBFAC,,, | Performed by: INTERNAL MEDICINE

## 2021-08-03 PROCEDURE — 1160F PR REVIEW ALL MEDS BY PRESCRIBER/CLIN PHARMACIST DOCUMENTED: ICD-10-PCS | Mod: CPTII,S$GLB,, | Performed by: INTERNAL MEDICINE

## 2021-08-03 PROCEDURE — 99214 OFFICE O/P EST MOD 30 MIN: CPT | Mod: 25,S$GLB,, | Performed by: INTERNAL MEDICINE

## 2021-08-03 RX ORDER — DEXTROMETHORPHAN HYDROBROMIDE, GUAIFENESIN 5; 100 MG/5ML; MG/5ML
650 LIQUID ORAL EVERY 8 HOURS
COMMUNITY

## 2021-08-23 ENCOUNTER — TELEPHONE (OUTPATIENT)
Dept: HEMATOLOGY/ONCOLOGY | Facility: CLINIC | Age: 71
End: 2021-08-23

## 2021-08-24 ENCOUNTER — TELEPHONE (OUTPATIENT)
Dept: HEMATOLOGY/ONCOLOGY | Facility: CLINIC | Age: 71
End: 2021-08-24

## 2021-09-12 NOTE — PT/OT/SLP PROGRESS
"Speech Language Pathology Treatment    Patient Name:  Rodolfo Messina   MRN:  [id number]  Admitting Diagnosis: S/P MVR (mitral valve replacement)    Recommendations:                 General Recommendations:  Dysphagia therapy  Diet recommendations:  NPO, Liquid Diet Level: NPO   Aspiration Precautions: Continue alternate means of nutrition and Strict aspiration precautions   General Precautions: Standard, fall, sternal  Communication strategies:  none    Subjective     "I just want to sleep before my pain medicine wears off."  "He's been doing his exercises every day."    Patient goals: to drink     Pain/Comfort:  · Pain Rating 1: 8/10   at peg site, nursing aware, already provided pain shot  · Pain Rating Post-Intervention 1: 8/10    Objective:     Has the patient been evaluated by SLP for swallowing?   Yes  Keep patient NPO? Yes   Current Respiratory Status: nasal cannula      Pt seen bedside with wife present.  Pt required minimal encouragement to participate in tx session 2/2 pain at peg site.  Handout of dysphagia excs noted at bedside.  Pt and wife report completion 3-4 x a day.  Pt completed falsetto /i/, willie maneuver, Mendelsohn, hard glottal /k/, and chin tuck against resistance excs x5-10 with fair-good ability given min cues.  Dampened swab given intermittently to aid initiation of swallow during excs.  No overt s/s aspiration.  PO not attempted 2/2 pt pain and discomfort at peg site, HOB reclined.  Ongoing education provided on role of SLP, dysphagia, POC and importance of completing excs daily.  Pt and wife in agreement. White board updated.       Assessment:     Rodolfo Messina is a 67 y.o. male with an SLP diagnosis of Dysphagia.      Goals:    SLP Goals        Problem: SLP Goal    Goal Priority Disciplines Outcome   SLP Goal     SLP Ongoing (interventions implemented as appropriate)   Description:  Speech Language Pathology Goals  Goals expected to be met by 6/11  1. Pt will tolerate puree & honey " thickened liquids for pleasure while completing 2-3 swallows per bolus w/o overt S/S aspiration, MIN A   3. Educate Pt and family on safe swallow strategies and S/S aspiration   4. Continue to assess swallow to determine feasibility of PO upgrade   5. Pt will complete dysphagia exercises given min A      Speech Language Pathology Goals  Goals expected to be met by 6/7:  1. Pt will tolerate trials of puree w/o overt S/S aspiration, MIN A - CONTINUE  2. Pt will tolerate trials of nectar-thickened liquids w/o overt S/S aspiration, MIN A - DISCONTINUE  3. Educate Pt and family on safe swallow strategies and S/S aspiration - CONTINUE  4. Continue to assess swallow to determine feasibility of PO upgrade - CONTINUE      Goals expected to be met by 6/4: goals not met 2/2 transfer to ICU 5/30  1. Pt will participate in ongoing swallowing assessment to determine safest and least restrictive diet.                           Plan:     · Patient to be seen:  5 x/week   · Plan of Care expires:  06/30/18  · Plan of Care reviewed with:  patient   · SLP Follow-Up:  Yes       Discharge recommendations:  rehabilitation facility   Barriers to Discharge:  None    Time Tracking:     SLP Treatment Date:   06/08/18  Speech Start Time:  1142  Speech Stop Time:  1205     Speech Total Time (min):  23 min    Billable Minutes: Treatment Swallowing Dysfunction 23    IRVIN Colby, CCC-SLP  06/08/2018   give steroids reassurance that this is localized allergic rx, common to yellow jacket sting give steroids reassurance that this is localized allergic rx, common to yellow jacket sting        Will DC with Medrol Franco. Diana Wesley PA-C

## 2021-09-13 NOTE — ASSESSMENT & PLAN NOTE
Iud removal    Date/Time: 9/13/2021 3:34 PM  Performed by: Matteo Schneider MD  Authorized by: Matteo Schneider MD   Universal Protocol:  Consent: Verbal consent obtained  Risks and benefits: risks, benefits and alternatives were discussed  Consent given by: patient  Time out: Immediately prior to procedure a "time out" was called to verify the correct patient, procedure, equipment, support staff and site/side marked as required  Patient understanding: patient states understanding of the procedure being performed  Procedure consent: procedure consent matches procedure scheduled  Required items: required blood products, implants, devices, and special equipment available  Patient identity confirmed: verbally with patient      Procedure:     Removed with no complications: yes    Comments:      No strings visible today  Alligator forceps used to locate strings in the lower uterine segment  Iud insertions    Date/Time: 9/13/2021 3:49 PM  Performed by: Matteo Schneider MD  Authorized by: Matteo Schneider MD   Universal Protocol:  Consent: Verbal consent obtained  Risks and benefits: risks, benefits and alternatives were discussed  Consent given by: patient  Time out: Immediately prior to procedure a "time out" was called to verify the correct patient, procedure, equipment, support staff and site/side marked as required    Patient understanding: patient states understanding of the procedure being performed  Patient consent: the patient's understanding of the procedure matches consent given  Procedure consent: procedure consent matches procedure scheduled  Relevant documents: relevant documents present and verified  Test results: test results available and properly labeled  Required items: required blood products, implants, devices, and special equipment available  Patient identity confirmed: verbally with patient        Procedure:     Cervix cleaned and prepped: yes      Speculum placed in vagina: yes Patient is presenting with recurrent anemia. During his hospitalization he received 2 units of PRBCs with appropriate response. He developed hypoxemic respiratory failure on 2/25 and was transferred to MICU. CT chest with multiple pulmonary nodules. CXR with bilateral infiltrates. Echo with mitral regurgitation and mobile density/thickening, unclear if mass vs. Vegetation vs. benign structural element    - Afebrile with no leukocytosis, still low likelihood for endocarditis  - Blood Cx x 1 from 2/25 now with Gram (+) cocci, awaiting speciation (Culture #2 still NGTD, so possible contaminant)  - Repeat Blood Cx's x 2 from yesterday in process  - Still plan for eventual SOWMYA to better assess mitral valve, however oxygen requirements still too high to safely do a SOWMYA without risk of decompensation; will defer for later today or later in the week if O2 can be weaned  - Agree with broad spectrum Abx for now per primary team and ID   Tenaculum applied to cervix: yes      Uterus sounded: yes      Uterus sound depth (cm):  9    IUD inserted with no complications: yes      IUD type:  ParaGard    Strings trimmed: yes    Post-procedure:     Patient tolerated procedure well: yes      Patient will follow up after next period: no

## 2021-10-13 ENCOUNTER — TELEPHONE (OUTPATIENT)
Dept: HEMATOLOGY/ONCOLOGY | Facility: CLINIC | Age: 71
End: 2021-10-13

## 2021-10-21 ENCOUNTER — TELEPHONE (OUTPATIENT)
Dept: HEMATOLOGY/ONCOLOGY | Facility: CLINIC | Age: 71
End: 2021-10-21

## 2021-10-22 ENCOUNTER — OFFICE VISIT (OUTPATIENT)
Dept: HEMATOLOGY/ONCOLOGY | Facility: CLINIC | Age: 71
End: 2021-10-22
Payer: MEDICARE

## 2021-10-22 ENCOUNTER — LAB VISIT (OUTPATIENT)
Dept: LAB | Facility: HOSPITAL | Age: 71
End: 2021-10-22
Payer: MEDICARE

## 2021-10-22 VITALS
RESPIRATION RATE: 16 BRPM | SYSTOLIC BLOOD PRESSURE: 156 MMHG | DIASTOLIC BLOOD PRESSURE: 69 MMHG | BODY MASS INDEX: 25.87 KG/M2 | OXYGEN SATURATION: 98 % | HEIGHT: 70 IN | HEART RATE: 69 BPM | WEIGHT: 180.69 LBS

## 2021-10-22 DIAGNOSIS — C85.10 B-CELL LYMPHOMA, UNSPECIFIED B-CELL LYMPHOMA TYPE, UNSPECIFIED BODY REGION: ICD-10-CM

## 2021-10-22 DIAGNOSIS — D69.6 THROMBOCYTOPENIA, UNSPECIFIED: ICD-10-CM

## 2021-10-22 DIAGNOSIS — D64.9 ANEMIA, UNSPECIFIED TYPE: ICD-10-CM

## 2021-10-22 DIAGNOSIS — C85.10 INDOLENT B-CELL LYMPHOMA: Primary | ICD-10-CM

## 2021-10-22 LAB
ALBUMIN SERPL BCP-MCNC: 3.8 G/DL (ref 3.5–5.2)
ALP SERPL-CCNC: 54 U/L (ref 55–135)
ALT SERPL W/O P-5'-P-CCNC: 18 U/L (ref 10–44)
ANION GAP SERPL CALC-SCNC: 8 MMOL/L (ref 8–16)
AST SERPL-CCNC: 19 U/L (ref 10–40)
BASOPHILS # BLD AUTO: 0.03 K/UL (ref 0–0.2)
BASOPHILS NFR BLD: 0.6 % (ref 0–1.9)
BILIRUB SERPL-MCNC: 0.7 MG/DL (ref 0.1–1)
BUN SERPL-MCNC: 21 MG/DL (ref 8–23)
CALCIUM SERPL-MCNC: 9.5 MG/DL (ref 8.7–10.5)
CHLORIDE SERPL-SCNC: 113 MMOL/L (ref 95–110)
CO2 SERPL-SCNC: 25 MMOL/L (ref 23–29)
CREAT SERPL-MCNC: 1.4 MG/DL (ref 0.5–1.4)
DIFFERENTIAL METHOD: ABNORMAL
EOSINOPHIL # BLD AUTO: 0.2 K/UL (ref 0–0.5)
EOSINOPHIL NFR BLD: 4.9 % (ref 0–8)
ERYTHROCYTE [DISTWIDTH] IN BLOOD BY AUTOMATED COUNT: 12.8 % (ref 11.5–14.5)
EST. GFR  (AFRICAN AMERICAN): 58 ML/MIN/1.73 M^2
EST. GFR  (NON AFRICAN AMERICAN): 50.2 ML/MIN/1.73 M^2
GLUCOSE SERPL-MCNC: 99 MG/DL (ref 70–110)
HCT VFR BLD AUTO: 33.4 % (ref 40–54)
HGB BLD-MCNC: 11.3 G/DL (ref 14–18)
IMM GRANULOCYTES # BLD AUTO: 0 K/UL (ref 0–0.04)
IMM GRANULOCYTES NFR BLD AUTO: 0 % (ref 0–0.5)
LDH SERPL L TO P-CCNC: 168 U/L (ref 110–260)
LYMPHOCYTES # BLD AUTO: 1.1 K/UL (ref 1–4.8)
LYMPHOCYTES NFR BLD: 24 % (ref 18–48)
MCH RBC QN AUTO: 33.8 PG (ref 27–31)
MCHC RBC AUTO-ENTMCNC: 33.8 G/DL (ref 32–36)
MCV RBC AUTO: 100 FL (ref 82–98)
MONOCYTES # BLD AUTO: 0.5 K/UL (ref 0.3–1)
MONOCYTES NFR BLD: 10.9 % (ref 4–15)
NEUTROPHILS # BLD AUTO: 2.8 K/UL (ref 1.8–7.7)
NEUTROPHILS NFR BLD: 59.6 % (ref 38–73)
NRBC BLD-RTO: 0 /100 WBC
PLATELET # BLD AUTO: 115 K/UL (ref 150–450)
PMV BLD AUTO: 11 FL (ref 9.2–12.9)
POTASSIUM SERPL-SCNC: 5.1 MMOL/L (ref 3.5–5.1)
PROT SERPL-MCNC: 6.3 G/DL (ref 6–8.4)
RBC # BLD AUTO: 3.34 M/UL (ref 4.6–6.2)
SODIUM SERPL-SCNC: 146 MMOL/L (ref 136–145)
WBC # BLD AUTO: 4.66 K/UL (ref 3.9–12.7)

## 2021-10-22 PROCEDURE — 99999 PR PBB SHADOW E&M-EST. PATIENT-LVL IV: ICD-10-PCS | Mod: PBBFAC,HCNC,, | Performed by: NURSE PRACTITIONER

## 2021-10-22 PROCEDURE — 83615 LACTATE (LD) (LDH) ENZYME: CPT | Mod: HCNC | Performed by: INTERNAL MEDICINE

## 2021-10-22 PROCEDURE — 3077F PR MOST RECENT SYSTOLIC BLOOD PRESSURE >= 140 MM HG: ICD-10-PCS | Mod: HCNC,CPTII,S$GLB, | Performed by: NURSE PRACTITIONER

## 2021-10-22 PROCEDURE — 99499 UNLISTED E&M SERVICE: CPT | Mod: HCNC,S$GLB,, | Performed by: NURSE PRACTITIONER

## 2021-10-22 PROCEDURE — 80053 COMPREHEN METABOLIC PANEL: CPT | Mod: HCNC | Performed by: INTERNAL MEDICINE

## 2021-10-22 PROCEDURE — 1101F PR PT FALLS ASSESS DOC 0-1 FALLS W/OUT INJ PAST YR: ICD-10-PCS | Mod: HCNC,CPTII,S$GLB, | Performed by: NURSE PRACTITIONER

## 2021-10-22 PROCEDURE — 85025 COMPLETE CBC W/AUTO DIFF WBC: CPT | Mod: HCNC | Performed by: INTERNAL MEDICINE

## 2021-10-22 PROCEDURE — 1160F RVW MEDS BY RX/DR IN RCRD: CPT | Mod: HCNC,CPTII,S$GLB, | Performed by: NURSE PRACTITIONER

## 2021-10-22 PROCEDURE — 1159F PR MEDICATION LIST DOCUMENTED IN MEDICAL RECORD: ICD-10-PCS | Mod: HCNC,CPTII,S$GLB, | Performed by: NURSE PRACTITIONER

## 2021-10-22 PROCEDURE — 36415 COLL VENOUS BLD VENIPUNCTURE: CPT | Mod: HCNC | Performed by: INTERNAL MEDICINE

## 2021-10-22 PROCEDURE — 1159F MED LIST DOCD IN RCRD: CPT | Mod: HCNC,CPTII,S$GLB, | Performed by: NURSE PRACTITIONER

## 2021-10-22 PROCEDURE — 3078F PR MOST RECENT DIASTOLIC BLOOD PRESSURE < 80 MM HG: ICD-10-PCS | Mod: HCNC,CPTII,S$GLB, | Performed by: NURSE PRACTITIONER

## 2021-10-22 PROCEDURE — 1160F PR REVIEW ALL MEDS BY PRESCRIBER/CLIN PHARMACIST DOCUMENTED: ICD-10-PCS | Mod: HCNC,CPTII,S$GLB, | Performed by: NURSE PRACTITIONER

## 2021-10-22 PROCEDURE — 1126F AMNT PAIN NOTED NONE PRSNT: CPT | Mod: HCNC,CPTII,S$GLB, | Performed by: NURSE PRACTITIONER

## 2021-10-22 PROCEDURE — 99499 RISK ADDL DX/OHS AUDIT: ICD-10-PCS | Mod: HCNC,S$GLB,, | Performed by: NURSE PRACTITIONER

## 2021-10-22 PROCEDURE — 99214 OFFICE O/P EST MOD 30 MIN: CPT | Mod: HCNC,S$GLB,, | Performed by: NURSE PRACTITIONER

## 2021-10-22 PROCEDURE — 3078F DIAST BP <80 MM HG: CPT | Mod: HCNC,CPTII,S$GLB, | Performed by: NURSE PRACTITIONER

## 2021-10-22 PROCEDURE — 99999 PR PBB SHADOW E&M-EST. PATIENT-LVL IV: CPT | Mod: PBBFAC,HCNC,, | Performed by: NURSE PRACTITIONER

## 2021-10-22 PROCEDURE — 3008F PR BODY MASS INDEX (BMI) DOCUMENTED: ICD-10-PCS | Mod: HCNC,CPTII,S$GLB, | Performed by: NURSE PRACTITIONER

## 2021-10-22 PROCEDURE — 3008F BODY MASS INDEX DOCD: CPT | Mod: HCNC,CPTII,S$GLB, | Performed by: NURSE PRACTITIONER

## 2021-10-22 PROCEDURE — 3288F PR FALLS RISK ASSESSMENT DOCUMENTED: ICD-10-PCS | Mod: HCNC,CPTII,S$GLB, | Performed by: NURSE PRACTITIONER

## 2021-10-22 PROCEDURE — 3288F FALL RISK ASSESSMENT DOCD: CPT | Mod: HCNC,CPTII,S$GLB, | Performed by: NURSE PRACTITIONER

## 2021-10-22 PROCEDURE — 3077F SYST BP >= 140 MM HG: CPT | Mod: HCNC,CPTII,S$GLB, | Performed by: NURSE PRACTITIONER

## 2021-10-22 PROCEDURE — 1126F PR PAIN SEVERITY QUANTIFIED, NO PAIN PRESENT: ICD-10-PCS | Mod: HCNC,CPTII,S$GLB, | Performed by: NURSE PRACTITIONER

## 2021-10-22 PROCEDURE — 1101F PT FALLS ASSESS-DOCD LE1/YR: CPT | Mod: HCNC,CPTII,S$GLB, | Performed by: NURSE PRACTITIONER

## 2021-10-22 PROCEDURE — 99214 PR OFFICE/OUTPT VISIT, EST, LEVL IV, 30-39 MIN: ICD-10-PCS | Mod: HCNC,S$GLB,, | Performed by: NURSE PRACTITIONER

## 2021-10-27 RX ORDER — MIRTAZAPINE 30 MG/1
TABLET, FILM COATED ORAL
Qty: 90 TABLET | Refills: 3 | Status: SHIPPED | OUTPATIENT
Start: 2021-10-27 | End: 2022-10-18 | Stop reason: SDUPTHER

## 2022-02-10 ENCOUNTER — TELEPHONE (OUTPATIENT)
Dept: OTOLARYNGOLOGY | Facility: CLINIC | Age: 72
End: 2022-02-10
Payer: MEDICARE

## 2022-02-10 NOTE — TELEPHONE ENCOUNTER
Returned call to patient and set up HAC for 2/15 at 3pm. Was thanked for calling.  ----- Message from Roxann Wallis sent at 2/10/2022 10:48 AM CST -----  Contact: [phone]  Who Called: PT  Regarding: questions about hearing aids    Would the patient rather a call back or a response via [facility]? Call back  Best Call Back Number: [phone]   Additional Information:

## 2022-02-11 ENCOUNTER — TELEPHONE (OUTPATIENT)
Dept: OTOLARYNGOLOGY | Facility: CLINIC | Age: 72
End: 2022-02-11
Payer: MEDICARE

## 2022-02-11 NOTE — TELEPHONE ENCOUNTER
Spoke with patient to let him know about his insurance benefit through [employer] with [employer].  He will contact his insurance company for the information and wanted to cancel his HAC appointment.

## 2022-02-24 ENCOUNTER — OFFICE VISIT (OUTPATIENT)
Dept: OTOLARYNGOLOGY | Facility: CLINIC | Age: 72
End: 2022-02-24
Payer: MEDICARE

## 2022-02-24 VITALS
DIASTOLIC BLOOD PRESSURE: 66 MMHG | WEIGHT: 178.88 LBS | HEART RATE: 77 BPM | BODY MASS INDEX: 25.61 KG/M2 | SYSTOLIC BLOOD PRESSURE: 120 MMHG | HEIGHT: 70 IN

## 2022-02-24 DIAGNOSIS — J31.0 CHRONIC ATROPHIC RHINITIS: Chronic | ICD-10-CM

## 2022-02-24 DIAGNOSIS — H93.13 TINNITUS AURIUM, BILATERAL: ICD-10-CM

## 2022-02-24 DIAGNOSIS — H60.63 CHRONIC OTITIS EXTERNA OF BOTH EARS, UNSPECIFIED TYPE: Chronic | ICD-10-CM

## 2022-02-24 DIAGNOSIS — H90.3 SENSORINEURAL HEARING LOSS (SNHL), BILATERAL: Chronic | ICD-10-CM

## 2022-02-24 DIAGNOSIS — H61.20 IMPACTED CERUMEN, UNSPECIFIED LATERALITY: Primary | ICD-10-CM

## 2022-02-24 PROCEDURE — 1159F PR MEDICATION LIST DOCUMENTED IN MEDICAL RECORD: ICD-10-PCS | Mod: HCNC,CPTII,S$GLB, | Performed by: OTOLARYNGOLOGY

## 2022-02-24 PROCEDURE — 3008F PR BODY MASS INDEX (BMI) DOCUMENTED: ICD-10-PCS | Mod: HCNC,CPTII,S$GLB, | Performed by: OTOLARYNGOLOGY

## 2022-02-24 PROCEDURE — 3288F PR FALLS RISK ASSESSMENT DOCUMENTED: ICD-10-PCS | Mod: HCNC,CPTII,S$GLB, | Performed by: OTOLARYNGOLOGY

## 2022-02-24 PROCEDURE — 1159F MED LIST DOCD IN RCRD: CPT | Mod: HCNC,CPTII,S$GLB, | Performed by: OTOLARYNGOLOGY

## 2022-02-24 PROCEDURE — 3074F PR MOST RECENT SYSTOLIC BLOOD PRESSURE < 130 MM HG: ICD-10-PCS | Mod: HCNC,CPTII,S$GLB, | Performed by: OTOLARYNGOLOGY

## 2022-02-24 PROCEDURE — 3074F SYST BP LT 130 MM HG: CPT | Mod: HCNC,CPTII,S$GLB, | Performed by: OTOLARYNGOLOGY

## 2022-02-24 PROCEDURE — 1126F PR PAIN SEVERITY QUANTIFIED, NO PAIN PRESENT: ICD-10-PCS | Mod: HCNC,CPTII,S$GLB, | Performed by: OTOLARYNGOLOGY

## 2022-02-24 PROCEDURE — 1160F PR REVIEW ALL MEDS BY PRESCRIBER/CLIN PHARMACIST DOCUMENTED: ICD-10-PCS | Mod: HCNC,CPTII,S$GLB, | Performed by: OTOLARYNGOLOGY

## 2022-02-24 PROCEDURE — 99213 PR OFFICE/OUTPT VISIT, EST, LEVL III, 20-29 MIN: ICD-10-PCS | Mod: 25,HCNC,S$GLB, | Performed by: OTOLARYNGOLOGY

## 2022-02-24 PROCEDURE — 3078F PR MOST RECENT DIASTOLIC BLOOD PRESSURE < 80 MM HG: ICD-10-PCS | Mod: HCNC,CPTII,S$GLB, | Performed by: OTOLARYNGOLOGY

## 2022-02-24 PROCEDURE — 3008F BODY MASS INDEX DOCD: CPT | Mod: HCNC,CPTII,S$GLB, | Performed by: OTOLARYNGOLOGY

## 2022-02-24 PROCEDURE — 69210 EAR CERUMEN REMOVAL: ICD-10-PCS | Mod: HCNC,S$GLB,, | Performed by: OTOLARYNGOLOGY

## 2022-02-24 PROCEDURE — 1126F AMNT PAIN NOTED NONE PRSNT: CPT | Mod: HCNC,CPTII,S$GLB, | Performed by: OTOLARYNGOLOGY

## 2022-02-24 PROCEDURE — 3288F FALL RISK ASSESSMENT DOCD: CPT | Mod: HCNC,CPTII,S$GLB, | Performed by: OTOLARYNGOLOGY

## 2022-02-24 PROCEDURE — 99999 PR PBB SHADOW E&M-EST. PATIENT-LVL IV: ICD-10-PCS | Mod: PBBFAC,HCNC,, | Performed by: OTOLARYNGOLOGY

## 2022-02-24 PROCEDURE — 69210 REMOVE IMPACTED EAR WAX UNI: CPT | Mod: HCNC,S$GLB,, | Performed by: OTOLARYNGOLOGY

## 2022-02-24 PROCEDURE — 1101F PT FALLS ASSESS-DOCD LE1/YR: CPT | Mod: HCNC,CPTII,S$GLB, | Performed by: OTOLARYNGOLOGY

## 2022-02-24 PROCEDURE — 1101F PR PT FALLS ASSESS DOC 0-1 FALLS W/OUT INJ PAST YR: ICD-10-PCS | Mod: HCNC,CPTII,S$GLB, | Performed by: OTOLARYNGOLOGY

## 2022-02-24 PROCEDURE — 1160F RVW MEDS BY RX/DR IN RCRD: CPT | Mod: HCNC,CPTII,S$GLB, | Performed by: OTOLARYNGOLOGY

## 2022-02-24 PROCEDURE — 99999 PR PBB SHADOW E&M-EST. PATIENT-LVL IV: CPT | Mod: PBBFAC,HCNC,, | Performed by: OTOLARYNGOLOGY

## 2022-02-24 PROCEDURE — 99213 OFFICE O/P EST LOW 20 MIN: CPT | Mod: 25,HCNC,S$GLB, | Performed by: OTOLARYNGOLOGY

## 2022-02-24 PROCEDURE — 3078F DIAST BP <80 MM HG: CPT | Mod: HCNC,CPTII,S$GLB, | Performed by: OTOLARYNGOLOGY

## 2022-02-24 RX ORDER — FLUTICASONE PROPIONATE 50 MCG
1 SPRAY, SUSPENSION (ML) NASAL DAILY
Qty: 11.1 ML | Refills: 11 | Status: SHIPPED | OUTPATIENT
Start: 2022-02-24 | End: 2022-03-26

## 2022-02-24 NOTE — PATIENT INSTRUCTIONS
I would recommend the use of a wax softening drop, either over the counter Debrox, baby oil, or mineral oil, on a weekly basis.  I also instructed the patient to avoid Qtips.     The patient was given a prescription for a nasal steroid and/or nasal antihistamine nasal spray and we discussed in detail the proper mechanism of use directing the spray away from the nasal septum.  In addition, we also discussed that it will take 3-4 weeks of daily use to achieve maximal effectiveness.  The patient will please call in 3-4 weeks with their progress.     How do you use a Nasal Corticosteroid Spray?    Make sure you understand your dosing instructions. Spray only the number of prescribed sprays in each nostril. Read the package instructions before using your spray the first time.    Most corticosteroid sprays suggest the following steps:    Wash your hands well.    Gently blow your nose to clear the passageway.    Shake the container several times.    Tilt your head slightly downward.  Use the opposite hand from the nostril you will be spraying to hold the spray bottle.    Block one nostril with your finger.  Insert the nasal applicator into the other nostril.    Aim the spray toward the outer wall of the nostril.  Inhale slowly through the nose and press the .    Breathe out and repeat to apply the prescribed number of sprays.  Repeat these steps for the other nostril.     Avoid sneezing or blowing your nose right after spraying.

## 2022-02-24 NOTE — PROCEDURES
Ear Cerumen Removal    Date/Time: 2/24/2022 2:40 PM  Performed by: Lynn Lopez MD  Authorized by: Lynn Lopez MD     Consent Done?:  Yes (Verbal)    Local anesthetic:  None  Location details:  Both ears  Procedure type: curette    Procedure type comment:  Suction  Cerumen  Removal Results:  Cerumen completely removed  Patient tolerance:  Patient tolerated the procedure well with no immediate complications     Mild JACQUELYN bilateral EACs.  Cerumen completely removed using binocular microscopy.  TMs intact/normal after CI removal.       Lynn Lopez MD  [facility]

## 2022-02-24 NOTE — PROGRESS NOTES
Patient ID: Rodolfo Messina is a 71 y.o. y.o. male    Chief Complaint:   Chief Complaint   Patient presents with    Cerumen Impaction     Right ear impaction        Patient is here to see me today for evaluation of a possible wax impaction in bilateral ears.  he has complaints of hearing loss in the affected ears, but denies pain or drainage.  This has been an issue in the past.  The patient has been using any sort of ear drop to soften the wax.  The prox yesterday and does feel that the ear on the right is slightly better today.  Also reports some chronic nasal congestion and postnasal drip.  Is not using any medications or nasal sprays.      Review of Systems   Constitutional: Negative for fever, chills, fatigue and unexpected weight change.   HENT: Positive for ear blockage. Negative for hearing loss, nosebleeds,  sore throat, facial swelling, rhinorrhea, sneezing, trouble swallowing, dental problem, voice change, tinnitus and ear discharge.    Eyes: Negative for redness, itching and visual disturbance.   Respiratory: Negative for cough, choking and wheezing.    Cardiovascular: Negative for chest pain and palpitations.   Gastrointestinal: Negative for abdominal pain.        No reflux.   Musculoskeletal: Negative for gait problem.   Skin: Negative for rash.   Neurological: Negative for dizziness, light-headedness and headaches.     Past Medical History:   Diagnosis Date    ANNA (acute kidney injury) 2/22/2018    Anemia     Anxiety     BPH (benign prostatic hyperplasia)     Coarse tremors     Coronary artery disease of native artery of native heart with stable angina pectoris 5/21/2018    Diverticulosis     Encounter for blood transfusion     Endocarditis     Mechanical heart valve present     Non Hodgkin's lymphoma 8/2/2018    Urinary retention      Past Surgical History:   Procedure Laterality Date    ANTERIOR CERVICAL DISCECTOMY N/A 10/16/2019    Procedure: DISCECTOMY, SPINE, CERVICAL, ANTERIOR  C6/7 ACDF;  Surgeon: Jay Reyes MD;  Location: [facility] OR 2ND FLR;  Service: Neurosurgery;  Laterality: N/A;    APPENDECTOMY      COLONOSCOPY      COLONOSCOPY N/A 2/1/2018    Procedure: COLONOSCOPY;  Surgeon: Richar Payan MD;  Location: [facility] ENDO (4TH FLR);  Service: Endoscopy;  Laterality: N/A;  PM prep    CORONARY ARTERY BYPASS GRAFT (CABG) N/A 5/25/2018    Procedure: AORTOCORONARY BYPASS-CABG;  Surgeon: Marvin Go MD;  [facility];  Service: Cardiovascular;  Laterality: N/A;    EYE SURGERY Right     cataract extraction    FINGER SURGERY      FRACTURE SURGERY Right     index finger    MITRAL VALVE REPLACEMENT N/A 5/25/2018    Procedure: Mitral Valve Replacement;  Surgeon: Marvin Go MD;  [facility];  Service: Cardiovascular;  Laterality: N/A;    TONSILLECTOMY       Social History     Socioeconomic History    Marital status:    Occupational History     Employer: [employer]   Tobacco Use    Smoking status: Never Smoker    Smokeless tobacco: Never Used   Substance and Sexual Activity    Alcohol use: Yes     Comment: social    Drug use: No    Sexual activity: Not Currently     Partners: Female     Family History   Problem Relation Age of Onset    Stroke Mother     Heart disease Mother         CABG    Heart disease Father         CABG    No Known Problems Sister     No Known Problems Brother     Melanoma Neg Hx        Objective:      Vitals:    02/24/22 1457   BP: 120/66   Pulse: 77       Physical Exam   HENT:   NC: Septum midline, turbinates 3+, no lesions, mucosa atrophic appearing.  OC/OP: Mucosa normal and without lesions.  Tongue and FOM soft, normal, no lesions.  Uvula midline. Soft palate and tonsillar area normal.    Neck: no adenopathy or masses.   Right Ear: Tympanic membrane, external ear and ear canal normal.  Small amount of cerumen in the EAC.    Left Ear: Tympanic membrane, external ear and ear canal normal.  Small amount      See  separate procedure note for microscopy and CI removal.         Assessment:         ICD-10-CM ICD-9-CM    1. Impacted cerumen, unspecified laterality  H61.20 380.4 fluticasone propionate (FLONASE) 50 mcg/actuation nasal spray   2. Sensorineural hearing loss (SNHL), bilateral  H90.3 389.18    3. Tinnitus aurium, bilateral  H93.13 388.31    4. Chronic atrophic rhinitis  J31.0 472.0    5. Chronic otitis externa of both ears, unspecified type  H60.63 380.23         Plan:       1.   Cerumen impaction:  Removed today without difficulty.  I would recommend the use of a wax softening drop, either over the counter Debrox or mineral oil, on a weekly basis.  I also instructed the patient to avoid Qtips.  2. Patient did have annual audiograms use hearing protection in noise.  He has audiologic evaluation set up at an outside facility.  3. The patient will try a course of nasal steroid and/or nasal antihistamine for the rhinitis issues.  he will use it as instructed daily.  he was instructed that these medications may take 2-3 weeks of consistent use before they will be at peak efficacy.      Follow-up in 1 year sooner if needed.      Lynn Lopez MD  [facility]

## 2022-03-08 ENCOUNTER — TELEPHONE (OUTPATIENT)
Dept: CARDIOLOGY | Facility: CLINIC | Age: 72
End: 2022-03-08
Payer: MEDICARE

## 2022-03-08 NOTE — TELEPHONE ENCOUNTER
Returned call to patient. Rescheduled appointment with Dr Lockett.    ----- Message from Kacy Hassan sent at 3/8/2022  8:16 AM CST -----  Type:  Needs Medical Advice    Who Called: self  Reason:reschedule appointment   Would the patient rather a call back or a response via [facility]? call  Best Call Back Number: [phone]  Additional Information: none

## 2022-04-14 ENCOUNTER — TELEPHONE (OUTPATIENT)
Dept: FAMILY MEDICINE | Facility: CLINIC | Age: 72
End: 2022-04-14
Payer: MEDICARE

## 2022-04-14 DIAGNOSIS — I50.22 CHRONIC SYSTOLIC CONGESTIVE HEART FAILURE: ICD-10-CM

## 2022-04-14 DIAGNOSIS — Z95.1 S/P CABG X 1: ICD-10-CM

## 2022-04-14 DIAGNOSIS — I10 HYPERTENSION, UNSPECIFIED TYPE: Primary | ICD-10-CM

## 2022-04-14 DIAGNOSIS — Z12.5 SCREENING FOR PROSTATE CANCER: ICD-10-CM

## 2022-05-02 RX ORDER — ATORVASTATIN CALCIUM 40 MG/1
TABLET, FILM COATED ORAL
Qty: 90 TABLET | Refills: 3 | Status: SHIPPED | OUTPATIENT
Start: 2022-05-02 | End: 2023-06-05

## 2022-05-02 NOTE — TELEPHONE ENCOUNTER
Care Due:                  Date            Visit Type   Department     Provider  --------------------------------------------------------------------------------    Last Visit: None Found      None         None Found                              EP -                              PRIMARY      [facility] FAM[facility]  Next Visit: 05-      CARE ([facility])   MEDICINE       Deric Claudio                                                            Last  Test          Frequency    Reason                     Performed    Due Date  --------------------------------------------------------------------------------    Lipid Panel.  12 months..  atorvastatin, mirtazapine  07- 07-    Powered by [employer]. Reference number: [id number].   5/02/2022 4:16:57 PM CDT

## 2022-05-02 NOTE — TELEPHONE ENCOUNTER
Refill Routing Note   Medication(s) are not appropriate for processing by [facility] for the following reason(s):      - Patient has not been seen in over 15 months by PCP    [facility] action(s):  Defer          Medication reconciliation completed: No     Appointments  past 12m or future 3m with PCP    Date Provider   Last Visit   1/15/2019 Deric Claudio MD   Next Visit   5/20/2022 Deric Claudio MD   ED visits in past 90 days: 0        Note composed:4:17 PM 05/02/2022

## 2022-05-17 ENCOUNTER — LAB VISIT (OUTPATIENT)
Dept: LAB | Facility: HOSPITAL | Age: 72
End: 2022-05-17
Attending: FAMILY MEDICINE
Payer: MEDICARE

## 2022-05-17 DIAGNOSIS — I10 HYPERTENSION, UNSPECIFIED TYPE: ICD-10-CM

## 2022-05-17 DIAGNOSIS — I50.22 CHRONIC SYSTOLIC CONGESTIVE HEART FAILURE: ICD-10-CM

## 2022-05-17 DIAGNOSIS — Z95.1 S/P CABG X 1: ICD-10-CM

## 2022-05-17 DIAGNOSIS — Z12.5 SCREENING FOR PROSTATE CANCER: ICD-10-CM

## 2022-05-17 LAB
ALBUMIN SERPL BCP-MCNC: 4.3 G/DL (ref 3.5–5.2)
ALP SERPL-CCNC: 86 U/L (ref 38–126)
ALT SERPL W/O P-5'-P-CCNC: 29 U/L (ref 10–44)
ANION GAP SERPL CALC-SCNC: 12 MMOL/L (ref 8–16)
AST SERPL-CCNC: 27 U/L (ref 15–46)
BASOPHILS # BLD AUTO: 0.03 K/UL (ref 0–0.2)
BASOPHILS NFR BLD: 0.7 % (ref 0–1.9)
BILIRUB SERPL-MCNC: 1 MG/DL (ref 0.1–1)
CALCIUM SERPL-MCNC: 9.2 MG/DL (ref 8.7–10.5)
CHLORIDE SERPL-SCNC: 111 MMOL/L (ref 95–110)
CHOLEST SERPL-MCNC: 109 MG/DL (ref 120–199)
CHOLEST/HDLC SERPL: 2.4 {RATIO} (ref 2–5)
CO2 SERPL-SCNC: 22 MMOL/L (ref 23–29)
COMPLEXED PSA SERPL-MCNC: 1.2 NG/ML (ref 0–4)
CREAT SERPL-MCNC: 1.33 MG/DL (ref 0.5–1.4)
DIFFERENTIAL METHOD: ABNORMAL
EOSINOPHIL # BLD AUTO: 0.2 K/UL (ref 0–0.5)
EOSINOPHIL NFR BLD: 5.2 % (ref 0–8)
ERYTHROCYTE [DISTWIDTH] IN BLOOD BY AUTOMATED COUNT: 13 % (ref 11.5–14.5)
EST. GFR  (AFRICAN AMERICAN): >60 ML/MIN/1.73 M^2
EST. GFR  (NON AFRICAN AMERICAN): 53.4 ML/MIN/1.73 M^2
GLUCOSE SERPL-MCNC: 87 MG/DL (ref 70–110)
HCT VFR BLD AUTO: 35.5 % (ref 40–54)
HDLC SERPL-MCNC: 46 MG/DL (ref 40–75)
HDLC SERPL: 42.2 % (ref 20–50)
HGB BLD-MCNC: 11.7 G/DL (ref 14–18)
IMM GRANULOCYTES # BLD AUTO: 0 K/UL (ref 0–0.04)
IMM GRANULOCYTES NFR BLD AUTO: 0 % (ref 0–0.5)
LDLC SERPL CALC-MCNC: 49 MG/DL (ref 63–159)
LYMPHOCYTES # BLD AUTO: 1 K/UL (ref 1–4.8)
LYMPHOCYTES NFR BLD: 23 % (ref 18–48)
MCH RBC QN AUTO: 33 PG (ref 27–31)
MCHC RBC AUTO-ENTMCNC: 33 G/DL (ref 32–36)
MCV RBC AUTO: 100 FL (ref 82–98)
MONOCYTES # BLD AUTO: 0.4 K/UL (ref 0.3–1)
MONOCYTES NFR BLD: 9.3 % (ref 4–15)
NEUTROPHILS # BLD AUTO: 2.6 K/UL (ref 1.8–7.7)
NEUTROPHILS NFR BLD: 61.8 % (ref 38–73)
NONHDLC SERPL-MCNC: 63 MG/DL
NRBC BLD-RTO: 0 /100 WBC
PLATELET # BLD AUTO: 139 K/UL (ref 150–450)
PMV BLD AUTO: 11.2 FL (ref 9.2–12.9)
POTASSIUM SERPL-SCNC: 4.7 MMOL/L (ref 3.5–5.1)
PROT SERPL-MCNC: 6.6 G/DL (ref 6–8.4)
RBC # BLD AUTO: 3.55 M/UL (ref 4.6–6.2)
SODIUM SERPL-SCNC: 145 MMOL/L (ref 136–145)
TRIGL SERPL-MCNC: 70 MG/DL (ref 30–150)
TSH SERPL DL<=0.005 MIU/L-ACNC: 2.33 UIU/ML (ref 0.4–4)
UUN UR-MCNC: 23 MG/DL (ref 2–20)
WBC # BLD AUTO: 4.21 K/UL (ref 3.9–12.7)

## 2022-05-17 PROCEDURE — 85025 COMPLETE CBC W/AUTO DIFF WBC: CPT | Mod: PO | Performed by: FAMILY MEDICINE

## 2022-05-17 PROCEDURE — 84153 ASSAY OF PSA TOTAL: CPT | Performed by: FAMILY MEDICINE

## 2022-05-17 PROCEDURE — 80061 LIPID PANEL: CPT | Performed by: FAMILY MEDICINE

## 2022-05-17 PROCEDURE — 80053 COMPREHEN METABOLIC PANEL: CPT | Mod: PO | Performed by: FAMILY MEDICINE

## 2022-05-17 PROCEDURE — 36415 COLL VENOUS BLD VENIPUNCTURE: CPT | Mod: PO | Performed by: FAMILY MEDICINE

## 2022-05-17 PROCEDURE — 84443 ASSAY THYROID STIM HORMONE: CPT | Mod: PO | Performed by: FAMILY MEDICINE

## 2022-05-20 ENCOUNTER — OFFICE VISIT (OUTPATIENT)
Dept: FAMILY MEDICINE | Facility: CLINIC | Age: 72
End: 2022-05-20
Payer: MEDICARE

## 2022-05-20 VITALS
WEIGHT: 172.81 LBS | SYSTOLIC BLOOD PRESSURE: 118 MMHG | BODY MASS INDEX: 24.74 KG/M2 | OXYGEN SATURATION: 96 % | HEART RATE: 100 BPM | HEIGHT: 70 IN | DIASTOLIC BLOOD PRESSURE: 64 MMHG | TEMPERATURE: 98 F

## 2022-05-20 DIAGNOSIS — Z95.1 S/P CABG X 1: ICD-10-CM

## 2022-05-20 DIAGNOSIS — Z12.5 SCREENING FOR PROSTATE CANCER: ICD-10-CM

## 2022-05-20 DIAGNOSIS — Z00.00 ROUTINE HEALTH MAINTENANCE: Primary | ICD-10-CM

## 2022-05-20 DIAGNOSIS — R73.9 HYPERGLYCEMIA: ICD-10-CM

## 2022-05-20 DIAGNOSIS — I10 HYPERTENSION, UNSPECIFIED TYPE: ICD-10-CM

## 2022-05-20 PROCEDURE — 1125F PR PAIN SEVERITY QUANTIFIED, PAIN PRESENT: ICD-10-PCS | Mod: CPTII,S$GLB,, | Performed by: FAMILY MEDICINE

## 2022-05-20 PROCEDURE — 1101F PR PT FALLS ASSESS DOC 0-1 FALLS W/OUT INJ PAST YR: ICD-10-PCS | Mod: CPTII,S$GLB,, | Performed by: FAMILY MEDICINE

## 2022-05-20 PROCEDURE — 3074F PR MOST RECENT SYSTOLIC BLOOD PRESSURE < 130 MM HG: ICD-10-PCS | Mod: CPTII,S$GLB,, | Performed by: FAMILY MEDICINE

## 2022-05-20 PROCEDURE — 3078F PR MOST RECENT DIASTOLIC BLOOD PRESSURE < 80 MM HG: ICD-10-PCS | Mod: CPTII,S$GLB,, | Performed by: FAMILY MEDICINE

## 2022-05-20 PROCEDURE — 3074F SYST BP LT 130 MM HG: CPT | Mod: CPTII,S$GLB,, | Performed by: FAMILY MEDICINE

## 2022-05-20 PROCEDURE — 3078F DIAST BP <80 MM HG: CPT | Mod: CPTII,S$GLB,, | Performed by: FAMILY MEDICINE

## 2022-05-20 PROCEDURE — 1101F PT FALLS ASSESS-DOCD LE1/YR: CPT | Mod: CPTII,S$GLB,, | Performed by: FAMILY MEDICINE

## 2022-05-20 PROCEDURE — 99397 PER PM REEVAL EST PAT 65+ YR: CPT | Mod: S$GLB,,, | Performed by: FAMILY MEDICINE

## 2022-05-20 PROCEDURE — 1159F PR MEDICATION LIST DOCUMENTED IN MEDICAL RECORD: ICD-10-PCS | Mod: CPTII,S$GLB,, | Performed by: FAMILY MEDICINE

## 2022-05-20 PROCEDURE — 3008F PR BODY MASS INDEX (BMI) DOCUMENTED: ICD-10-PCS | Mod: CPTII,S$GLB,, | Performed by: FAMILY MEDICINE

## 2022-05-20 PROCEDURE — 3288F PR FALLS RISK ASSESSMENT DOCUMENTED: ICD-10-PCS | Mod: CPTII,S$GLB,, | Performed by: FAMILY MEDICINE

## 2022-05-20 PROCEDURE — 1159F MED LIST DOCD IN RCRD: CPT | Mod: CPTII,S$GLB,, | Performed by: FAMILY MEDICINE

## 2022-05-20 PROCEDURE — 99499 UNLISTED E&M SERVICE: CPT | Mod: S$GLB,,, | Performed by: FAMILY MEDICINE

## 2022-05-20 PROCEDURE — 1125F AMNT PAIN NOTED PAIN PRSNT: CPT | Mod: CPTII,S$GLB,, | Performed by: FAMILY MEDICINE

## 2022-05-20 PROCEDURE — 3008F BODY MASS INDEX DOCD: CPT | Mod: CPTII,S$GLB,, | Performed by: FAMILY MEDICINE

## 2022-05-20 PROCEDURE — 99499 RISK ADDL DX/OHS AUDIT: ICD-10-PCS | Mod: S$GLB,,, | Performed by: FAMILY MEDICINE

## 2022-05-20 PROCEDURE — 3288F FALL RISK ASSESSMENT DOCD: CPT | Mod: CPTII,S$GLB,, | Performed by: FAMILY MEDICINE

## 2022-05-20 PROCEDURE — 99397 PR PREVENTIVE VISIT,EST,65 & OVER: ICD-10-PCS | Mod: S$GLB,,, | Performed by: FAMILY MEDICINE

## 2022-05-20 RX ORDER — CLINDAMYCIN HYDROCHLORIDE 150 MG/1
150 CAPSULE ORAL 3 TIMES DAILY
COMMUNITY
End: 2022-06-21

## 2022-05-20 NOTE — PROGRESS NOTES
" Patient ID: Rodolfo Messina is a 71 y.o. male.    Chief Complaint: Annual Exam (Labs 05/17/2022) and Hand Injury (Right hand middle finger x 2 weeks working on car tire.  No imaging)    HPI      Rodolfo Messina is a 71 y.o. male. here for annual exam.   No current complaints except finger on right hand middle finger swollen at interphalangeal joint after contusion.  Remains swollen difficult to close hand completely.  No erythema no drainage.        Review of Symptoms    Constitutional: Negative.    HENT: Negative.    Eyes: Negative.    Respiratory: Negative.    Cardiovascular: Negative.    Gastrointestinal: Negative.    Endocrine: Negative.    Genitourinary: Negative.    Musculoskeletal: Negative.    Skin: Negative.    Allergic/Immunologic: Negative.    Neurological: Negative.    Hematological: Negative.    Psychiatric/Behavioral: Negative.      Except as above in HPI      Vitals:    05/20/22 1112   BP: 118/64   BP Location: Right arm   Patient Position: Sitting   BP Method: Medium (Manual)   Pulse: 100   Temp: 98.2 °F (36.8 °C)   TempSrc: Oral   SpO2: 96%   Weight: 78.4 kg (172 lb 13.5 oz)   Height: 5' 10" (1.778 m)        Physical  Exam      Constitutional:  Oriented to person, place, and time. Appears well-developed and well-nourished.     HENT:   Head: Normocephalic and atraumatic.     Right Ear: Tympanic membrane, ear canal and External ear normal     Left Ear: Tympanic membrane, ear canal and External ear normal     Nose: Nose normal. No rhinorrhea or nasal deformity.     Mouth/Throat: Uvula is midline, oropharynx is clear and moist and mucous membranes are normal.      Eyes: Conjunctivae are normal. Right eye exhibits no discharge. Left eye exhibits no discharge. No scleral icterus.     Neck:  No JVD present. No tracheal deviation  []  Neck supple.   []  No Carotid bruit    Cardiovascular:  Regular rate and rhythm with normal S1 and S2     Pulmonary/Chest:   Clear to auscultation bilaterally without " wheezes, rhonchi or rales    Musculoskeletal: Normal range of motion. No edema or tenderness.   No deformity     Lymphadenopathy:  No cervical adenopathy.     Neurological:  Alert and oriented to person, place, and time. Coordination normal.     Skin: Skin is warm and dry. No rash noted.     Psychiatric: Normal mood and affect. Speech is normal and behavior is normal. Judgment and thought content normal.     Complete Blood Count  Lab Results   Component Value Date    RBC 3.55 (L) 05/17/2022    HGB 11.7 (L) 05/17/2022    HCT 35.5 (L) 05/17/2022     (H) 05/17/2022    MCH 33.0 (H) 05/17/2022    MCHC 33.0 05/17/2022    RDW 13.0 05/17/2022     (L) 05/17/2022    MPV 11.2 05/17/2022    GRAN 2.6 05/17/2022    GRAN 61.8 05/17/2022    LYMPH 1.0 05/17/2022    LYMPH 23.0 05/17/2022    MONO 0.4 05/17/2022    MONO 9.3 05/17/2022    EOS 0.2 05/17/2022    BASO 0.03 05/17/2022    EOSINOPHIL 5.2 05/17/2022    BASOPHIL 0.7 05/17/2022    DIFFMETHOD Automated 05/17/2022       Comprehensive Metabolic Panel  Lab Results   Component Value Date    GLU 87 05/17/2022    BUN 23 (H) 05/17/2022    CREATININE 1.33 05/17/2022     05/17/2022    K 4.7 05/17/2022     (H) 05/17/2022    PROT 6.6 05/17/2022    ALBUMIN 4.3 05/17/2022    BILITOT 1.0 05/17/2022    AST 27 05/17/2022    ALKPHOS 86 05/17/2022    CO2 22 (L) 05/17/2022    ALT 29 05/17/2022    ANIONGAP 12 05/17/2022    EGFRNONAA 53.4 (A) 05/17/2022    ESTGFRAFRICA >60.0 05/17/2022       TSH  Lab Results   Component Value Date    TSH 2.330 05/17/2022       Assessment / Plan:      ICD-10-CM ICD-9-CM   1. Routine health maintenance  Z00.00 V70.0   2. Hypertension, unspecified type  I10 401.9   3. S/P CABG x 1  Z95.1 V45.81   4. Hyperglycemia  R73.9 790.29   5. Screening for prostate cancer  Z12.5 V76.44     Routine health maintenance    Hypertension, unspecified type  -     Comprehensive Metabolic Panel; Future  -     CBC Auto Differential; Future  -     Lipid Panel;  Future  -     TSH; Future    S/P CABG x 1  -     Comprehensive Metabolic Panel; Future  -     CBC Auto Differential; Future  -     Lipid Panel; Future  -     TSH; Future    Hyperglycemia  -     Hemoglobin A1C; Future    Screening for prostate cancer  -     PSA, Screening; Future; Expected date: 05/17/2023          Discussed how to stay healthy including: diet, and exercise.

## 2022-05-23 NOTE — PROGRESS NOTES
Patient, Rodolfo Messina (MRN #[id number]), presented with a recent Platelet count less than 150 K/uL consistent with the definition of thrombocytopenia (ICD10 - D69.6).    Platelets   Date Value Ref Range Status   05/17/2022 139 (L) 150 - 450 K/uL Final     The patient's thrombocytopenia was monitored, evaluated, addressed and/or treated. This addendum to the medical record is made on 05/23/2022.

## 2022-05-25 NOTE — PROGRESS NOTES
Dr. Varner, Dr. Saldaña, and Dr. Hawkins at bedside for rounds. MDs aware of gtts, vent settings, VS, swan profile, UOP, labs, and newest 12 lead EKG. Plan for day is to d/c balloon pump, pt to lay flat for 6 hours post removal, then wean to extubate. POC reviewed with pt and his family. Will continue to monitor.    Consent (Marginal Mandibular)/Introductory Paragraph: The rationale for Mohs surgery was explained to the patient and written informed consent was obtained. The risks, benefits, and alternatives to Mohs Surgery were discussed in detail. Specifically, the risks of infection, scarring, bleeding, prolonged wound healing, incomplete removal, allergy to anesthesia, nerve injury, and recurrence were addressed. Given the site, the patient was also counseled about the risk of damage to the marginal mandibular nerve, potentially permanently resulting in an asymmetrical smile, inability to pull the lower lip downward or outward, inability to rotate lip outward, and drooling. The patient expressed understanding and desired to proceed. Prior to beginning the procedure, the surgical site was identified with the assistance of the patient and the medical record, including photographs and/or maps if available. The site was then clearly confirmed by the patient by direct visualization and/or the use of a hand mirror and a cotton-tipped applicator stick. All components of Universal Protocol/PAUSE Rule were completed: Prior to beginning the procedure, a pause was taken during which the surgeon, his assistant, and the patient verbally confirmed the patient's identification, the intended procedure, and the correct surgical site. Pause time:

## 2022-06-08 ENCOUNTER — TELEPHONE (OUTPATIENT)
Dept: HEMATOLOGY/ONCOLOGY | Facility: CLINIC | Age: 72
End: 2022-06-08
Payer: MEDICARE

## 2022-06-08 RX ORDER — PANTOPRAZOLE SODIUM 40 MG/1
TABLET, DELAYED RELEASE ORAL
Qty: 90 TABLET | Refills: 3 | Status: SHIPPED | OUTPATIENT
Start: 2022-06-08 | End: 2023-06-21

## 2022-06-08 NOTE — TELEPHONE ENCOUNTER
No new care gaps identified.  [facility] Embedded Care Gaps. Reference number: [id number]. 6/08/2022   5:56:46 PM CDT  
Refill Routing Note   Medication(s) are not appropriate for processing by [facility] for the following reason(s):      - Required indication for medication not on problem list (GERD)    [facility] action(s):  Defer Medication-related problems identified: No indication for a medication        Medication reconciliation completed: No     Appointments  past 12m or future 3m with PCP    Date Provider   Last Visit   5/20/2022 Deric Claudio MD   Next Visit   Visit date not found Deric Claudio MD   ED visits in past 90 days: 0        Note composed:6:00 PM 06/08/2022         
OBSERVATION

## 2022-06-08 NOTE — TELEPHONE ENCOUNTER
"----- Message from Tiff Brady sent at 6/8/2022 10:06 AM CDT -----  Name Of Caller: Lupe- wife    Contact Preference?: [phone]    What is the nature of the call?: pt face keeps swelling up. States dentist put him on antibiotics and it cleared up and then reoccurred           Additional Notes:  "Thank you for all that you do for our patients'"     "

## 2022-06-08 NOTE — TELEPHONE ENCOUNTER
Facial swelling went down after taking ABX the dentist prescribed 3 weeks ago started May 19th completed June 3rd. X-ray done by dentist did not show any areas of concern. Facial swelling started again Monday this week.     There is a tender spot on the top part of his gums, recommended that he call the dentist for further evaluation.

## 2022-06-21 ENCOUNTER — OFFICE VISIT (OUTPATIENT)
Dept: CARDIOLOGY | Facility: CLINIC | Age: 72
End: 2022-06-21
Payer: MEDICARE

## 2022-06-21 VITALS
WEIGHT: 175.5 LBS | HEART RATE: 66 BPM | BODY MASS INDEX: 25.13 KG/M2 | HEIGHT: 70 IN | DIASTOLIC BLOOD PRESSURE: 70 MMHG | SYSTOLIC BLOOD PRESSURE: 138 MMHG | OXYGEN SATURATION: 96 %

## 2022-06-21 DIAGNOSIS — I10 PRIMARY HYPERTENSION: Chronic | ICD-10-CM

## 2022-06-21 DIAGNOSIS — Z95.2 S/P MVR (MITRAL VALVE REPLACEMENT): Primary | ICD-10-CM

## 2022-06-21 DIAGNOSIS — Z95.1 S/P CABG X 1: ICD-10-CM

## 2022-06-21 DIAGNOSIS — Z86.79 H/O BACTERIAL ENDOCARDITIS: ICD-10-CM

## 2022-06-21 DIAGNOSIS — Z86.79 H/O ATRIAL FLUTTER: ICD-10-CM

## 2022-06-21 DIAGNOSIS — I50.22 CHRONIC SYSTOLIC CONGESTIVE HEART FAILURE: ICD-10-CM

## 2022-06-21 DIAGNOSIS — I10 ESSENTIAL HYPERTENSION: ICD-10-CM

## 2022-06-21 PROCEDURE — 3075F PR MOST RECENT SYSTOLIC BLOOD PRESS GE 130-139MM HG: ICD-10-PCS | Mod: CPTII,S$GLB,, | Performed by: INTERNAL MEDICINE

## 2022-06-21 PROCEDURE — 3008F PR BODY MASS INDEX (BMI) DOCUMENTED: ICD-10-PCS | Mod: CPTII,S$GLB,, | Performed by: INTERNAL MEDICINE

## 2022-06-21 PROCEDURE — 99999 PR PBB SHADOW E&M-EST. PATIENT-LVL III: ICD-10-PCS | Mod: PBBFAC,,, | Performed by: INTERNAL MEDICINE

## 2022-06-21 PROCEDURE — 99499 UNLISTED E&M SERVICE: CPT | Mod: S$GLB,,, | Performed by: INTERNAL MEDICINE

## 2022-06-21 PROCEDURE — 1160F PR REVIEW ALL MEDS BY PRESCRIBER/CLIN PHARMACIST DOCUMENTED: ICD-10-PCS | Mod: CPTII,S$GLB,, | Performed by: INTERNAL MEDICINE

## 2022-06-21 PROCEDURE — 3075F SYST BP GE 130 - 139MM HG: CPT | Mod: CPTII,S$GLB,, | Performed by: INTERNAL MEDICINE

## 2022-06-21 PROCEDURE — 1126F AMNT PAIN NOTED NONE PRSNT: CPT | Mod: CPTII,S$GLB,, | Performed by: INTERNAL MEDICINE

## 2022-06-21 PROCEDURE — 3288F FALL RISK ASSESSMENT DOCD: CPT | Mod: CPTII,S$GLB,, | Performed by: INTERNAL MEDICINE

## 2022-06-21 PROCEDURE — 99999 PR PBB SHADOW E&M-EST. PATIENT-LVL III: CPT | Mod: PBBFAC,,, | Performed by: INTERNAL MEDICINE

## 2022-06-21 PROCEDURE — 1126F PR PAIN SEVERITY QUANTIFIED, NO PAIN PRESENT: ICD-10-PCS | Mod: CPTII,S$GLB,, | Performed by: INTERNAL MEDICINE

## 2022-06-21 PROCEDURE — 1159F MED LIST DOCD IN RCRD: CPT | Mod: CPTII,S$GLB,, | Performed by: INTERNAL MEDICINE

## 2022-06-21 PROCEDURE — 3288F PR FALLS RISK ASSESSMENT DOCUMENTED: ICD-10-PCS | Mod: CPTII,S$GLB,, | Performed by: INTERNAL MEDICINE

## 2022-06-21 PROCEDURE — 99214 PR OFFICE/OUTPT VISIT, EST, LEVL IV, 30-39 MIN: ICD-10-PCS | Mod: S$GLB,,, | Performed by: INTERNAL MEDICINE

## 2022-06-21 PROCEDURE — 1159F PR MEDICATION LIST DOCUMENTED IN MEDICAL RECORD: ICD-10-PCS | Mod: CPTII,S$GLB,, | Performed by: INTERNAL MEDICINE

## 2022-06-21 PROCEDURE — 1160F RVW MEDS BY RX/DR IN RCRD: CPT | Mod: CPTII,S$GLB,, | Performed by: INTERNAL MEDICINE

## 2022-06-21 PROCEDURE — 3078F DIAST BP <80 MM HG: CPT | Mod: CPTII,S$GLB,, | Performed by: INTERNAL MEDICINE

## 2022-06-21 PROCEDURE — 93000 ELECTROCARDIOGRAM COMPLETE: CPT | Mod: S$GLB,,, | Performed by: INTERNAL MEDICINE

## 2022-06-21 PROCEDURE — 99214 OFFICE O/P EST MOD 30 MIN: CPT | Mod: S$GLB,,, | Performed by: INTERNAL MEDICINE

## 2022-06-21 PROCEDURE — 99499 RISK ADDL DX/OHS AUDIT: ICD-10-PCS | Mod: S$GLB,,, | Performed by: INTERNAL MEDICINE

## 2022-06-21 PROCEDURE — 1101F PT FALLS ASSESS-DOCD LE1/YR: CPT | Mod: CPTII,S$GLB,, | Performed by: INTERNAL MEDICINE

## 2022-06-21 PROCEDURE — 3008F BODY MASS INDEX DOCD: CPT | Mod: CPTII,S$GLB,, | Performed by: INTERNAL MEDICINE

## 2022-06-21 PROCEDURE — 1101F PR PT FALLS ASSESS DOC 0-1 FALLS W/OUT INJ PAST YR: ICD-10-PCS | Mod: CPTII,S$GLB,, | Performed by: INTERNAL MEDICINE

## 2022-06-21 PROCEDURE — 93000 EKG 12-LEAD: ICD-10-PCS | Mod: S$GLB,,, | Performed by: INTERNAL MEDICINE

## 2022-06-21 PROCEDURE — 3078F PR MOST RECENT DIASTOLIC BLOOD PRESSURE < 80 MM HG: ICD-10-PCS | Mod: CPTII,S$GLB,, | Performed by: INTERNAL MEDICINE

## 2022-06-21 RX ORDER — CETIRIZINE HYDROCHLORIDE 10 MG/1
10 TABLET ORAL DAILY
COMMUNITY

## 2022-06-21 RX ORDER — AMLODIPINE BESYLATE 5 MG/1
5 TABLET ORAL DAILY
Qty: 90 TABLET | Refills: 3 | Status: SHIPPED | OUTPATIENT
Start: 2022-06-21 | End: 2024-03-20 | Stop reason: DRUGHIGH

## 2022-06-21 RX ORDER — AZITHROMYCIN 500 MG/1
500 TABLET, FILM COATED ORAL DAILY
Qty: 5 TABLET | Refills: 3 | Status: SHIPPED | OUTPATIENT
Start: 2022-06-21 | End: 2023-01-10 | Stop reason: ALTCHOICE

## 2022-06-21 RX ORDER — FLUTICASONE PROPIONATE 50 MCG
1 SPRAY, SUSPENSION (ML) NASAL DAILY
COMMUNITY
End: 2024-03-20 | Stop reason: SDUPTHER

## 2022-06-21 NOTE — PROGRESS NOTES
Subjective:      Patient ID: Rodolfo Messina is a 72 y.o. male.    Chief Complaint: Follow-up    HPI:  Stays active.    Washes the car..    Able to walk several blocks without difficulty.    Does some housework and yardwork.    Review of Systems   Cardiovascular: Negative for chest pain, claudication, dyspnea on exertion, irregular heartbeat, leg swelling, near-syncope, orthopnea, palpitations and syncope.      A month ago pt felt weak and found his blood pressure in the 90's and so he reduced his amlodipine 5 mg daily instead of bid.    BP at home on lowered dose of amlodipine is typically 125/70      Past Medical History:   Diagnosis Date    ANNA (acute kidney injury) 2/22/2018    Anemia     Anxiety     BPH (benign prostatic hyperplasia)     Coarse tremors     Coronary artery disease of native artery of native heart with stable angina pectoris 5/21/2018    Diverticulosis     Encounter for blood transfusion     Endocarditis     Mechanical heart valve present     Non Hodgkin's lymphoma 8/2/2018    Urinary retention         Past Surgical History:   Procedure Laterality Date    ANTERIOR CERVICAL DISCECTOMY N/A 10/16/2019    Procedure: DISCECTOMY, SPINE, CERVICAL, ANTERIOR C6/7 ACDF;  Surgeon: Jay Reyes MD;  Location: [facility] OR [address] [place] [address];  Service: Neurosurgery;  Laterality: N/A;    APPENDECTOMY      COLONOSCOPY      COLONOSCOPY N/A 2/1/2018    Procedure: COLONOSCOPY;  Surgeon: Richar Payan MD;  Location: [facility] (4TH FLR);  Service: Endoscopy;  Laterality: N/A;  PM prep    CORONARY ARTERY BYPASS GRAFT (CABG) N/A 5/25/2018    Procedure: AORTOCORONARY BYPASS-CABG;  Surgeon: Marvin Go MD;  [facility];  Service: Cardiovascular;  Laterality: N/A;    EYE SURGERY Right     cataract extraction    FINGER SURGERY      FRACTURE SURGERY Right     index finger    MITRAL VALVE REPLACEMENT N/A 5/25/2018    Procedure: Mitral Valve Replacement;  Surgeon: Marvin Go MD;  Location:  [facility] OR 2ND FLR;  Service: Cardiovascular;  Laterality: N/A;    TONSILLECTOMY         Family History   Problem Relation Age of Onset    Stroke Mother     Heart disease Mother         CABG    Heart disease Father         CABG    No Known Problems Sister     No Known Problems Brother     Melanoma Neg Hx        Social History     Socioeconomic History    Marital status:    Occupational History     Employer: [employer]   Tobacco Use    Smoking status: Never Smoker    Smokeless tobacco: Never Used   Substance and Sexual Activity    Alcohol use: Yes     Comment: social    Drug use: No    Sexual activity: Not Currently     Partners: Female       Current Outpatient Medications on File Prior to Visit   Medication Sig Dispense Refill    acetaminophen (TYLENOL) 650 MG TbSR Take 650 mg by mouth every 8 (eight) hours.      ascorbic acid, vitamin C, (VITAMIN C) 1000 MG tablet Take 1,000 mg by mouth every other day.      aspirin (ECOTRIN) 81 MG EC tablet Take 81 mg by mouth once daily.      atorvastatin (LIPITOR) 40 MG tablet GIVE 1 TABLET VIA G TUBE DAILY 90 tablet 3    cetirizine (ZYRTEC) 10 MG tablet Take 10 mg by mouth once daily.      fluticasone propionate (FLONASE) 50 mcg/actuation nasal spray 1 spray by Each Nostril route once daily.      gentamicin (GARAMYCIN) 0.3 % ophthalmic solution       lactose-reduced food (ENSURE HIGH PROTEIN-MUSCLE ORAL) Take by mouth once daily.       magnesium 250 mg Tab Take by mouth once daily.      melatonin 10 mg Tab Take by mouth as needed.       mirtazapine (REMERON) 30 MG tablet TAKE 1 TABLET EVERY EVENING 90 tablet 3    multivit-min/FA/lycopen/lutein (ADULTS 50 PLUS ORAL) Take by mouth once daily.       pantoprazole (PROTONIX) 40 MG tablet TAKE 1 TABLET EVERY DAY 90 tablet 3    SAW PALMETTO ORAL Take 450 mg by mouth once daily.      tamsulosin (FLOMAX) 0.4 mg Cap Take 2 capsules (0.8 mg total) by mouth once daily. (Patient taking differently: Take 0.4 mg  "by mouth once daily.) 180 capsule 0    [DISCONTINUED] amLODIPine (NORVASC) 5 MG tablet TAKE 1 TABLET TWICE DAILY 180 tablet 3    [DISCONTINUED] azithromycin (ZITHROMAX) 500 MG tablet Take 1 tablet (500 mg total) by mouth once daily. Take one tablet an hour before dental work. (Patient not taking: Reported on 6/21/2022) 5 tablet 3    [DISCONTINUED] clindamycin (CLEOCIN) 150 MG capsule Take 150 mg by mouth 3 (three) times daily.      [DISCONTINUED] loratadine (CLARITIN) 10 mg tablet Take 10 mg by mouth once daily.       No current facility-administered medications on file prior to visit.       Review of patient's allergies indicates:   Allergen Reactions    Metoprolol Diarrhea    Shellfish containing products     Augmentin [amoxicillin-pot clavulanate]      Patient felt that it raised BP and requested to have it added as intolerance. Tolerated unasyn and ampicillin.    Ciprofloxacin     Flagyl [metronidazole]     Lisinopril Other (See Comments)     cough    Mysoline [primidone]     Omnicef [cefdinir]      Objective:     Vitals:    06/21/22 1433 06/21/22 1459   BP: (!) 150/80 138/70   BP Location: Left arm Left arm   Patient Position: Sitting Sitting   BP Method: Large (Manual)    Pulse: 66    SpO2: 96%    Weight: 79.6 kg (175 lb 7.8 oz)    Height: 5' 10" (1.778 m)         Physical Exam  Vitals reviewed.   Constitutional:       General: He is not in acute distress.     Appearance: He is well-developed. He is not diaphoretic.   Eyes:      General: No scleral icterus.  Neck:      Vascular: No carotid bruit or JVD.   Cardiovascular:      Rate and Rhythm: Regular rhythm.      Heart sounds: Murmur (II/VI systolic) heard.     No friction rub. No gallop.   Pulmonary:      Effort: Pulmonary effort is normal. No respiratory distress.      Breath sounds: Normal breath sounds.   Musculoskeletal:      Right lower leg: No edema.      Left lower leg: No edema.   Skin:     General: Skin is warm and dry.   Neurological:    "   Mental Status: He is alert and oriented to person, place, and time.   Psychiatric:         Behavior: Behavior normal.         Thought Content: Thought content normal.         Judgment: Judgment normal.        ECG: NSR with first degree AV block, leftward axis, otherwise WNL, reviewed by me , no significant change    Lab Visit on 05/17/2022   Component Date Value Ref Range Status    Sodium 05/17/2022 145  136 - 145 mmol/L Final    Potassium 05/17/2022 4.7  3.5 - 5.1 mmol/L Final    Chloride 05/17/2022 111 (A) 95 - 110 mmol/L Final    CO2 05/17/2022 22 (A) 23 - 29 mmol/L Final    Glucose 05/17/2022 87  70 - 110 mg/dL Final    BUN 05/17/2022 23 (A) 2 - 20 mg/dL Final    Creatinine 05/17/2022 1.33  0.50 - 1.40 mg/dL Final    Calcium 05/17/2022 9.2  8.7 - 10.5 mg/dL Final    Total Protein 05/17/2022 6.6  6.0 - 8.4 g/dL Final    Albumin 05/17/2022 4.3  3.5 - 5.2 g/dL Final    Total Bilirubin 05/17/2022 1.0  0.1 - 1.0 mg/dL Final    Alkaline Phosphatase 05/17/2022 86  38 - 126 U/L Final    AST 05/17/2022 27  15 - 46 U/L Final    ALT 05/17/2022 29  10 - 44 U/L Final    Anion Gap 05/17/2022 12  8 - 16 mmol/L Final    eGFR if African American 05/17/2022 >60.0  >60 mL/min/1.73 m^2 Final    eGFR if non African American 05/17/2022 53.4 (A) >60 mL/min/1.73 m^2 Final    WBC 05/17/2022 4.21  3.90 - 12.70 K/uL Final    RBC 05/17/2022 3.55 (A) 4.60 - 6.20 M/uL Final    Hemoglobin 05/17/2022 11.7 (A) 14.0 - 18.0 g/dL Final    Hematocrit 05/17/2022 35.5 (A) 40.0 - 54.0 % Final    MCV 05/17/2022 100 (A) 82 - 98 fL Final    MCH 05/17/2022 33.0 (A) 27.0 - 31.0 pg Final    MCHC 05/17/2022 33.0  32.0 - 36.0 g/dL Final    RDW 05/17/2022 13.0  11.5 - 14.5 % Final    Platelets 05/17/2022 139 (A) 150 - 450 K/uL Final    MPV 05/17/2022 11.2  9.2 - 12.9 fL Final    Immature Granulocytes 05/17/2022 0.0  0.0 - 0.5 % Final    Gran # (ANC) 05/17/2022 2.6  1.8 - 7.7 K/uL Final    Immature Grans (Abs) 05/17/2022 0.00   0.00 - 0.04 K/uL Final    Lymph # 05/17/2022 1.0  1.0 - 4.8 K/uL Final    Mono # 05/17/2022 0.4  0.3 - 1.0 K/uL Final    Eos # 05/17/2022 0.2  0.0 - 0.5 K/uL Final    Baso # 05/17/2022 0.03  0.00 - 0.20 K/uL Final    nRBC 05/17/2022 0  0 /100 WBC Final    Gran % 05/17/2022 61.8  38.0 - 73.0 % Final    Lymph % 05/17/2022 23.0  18.0 - 48.0 % Final    Mono % 05/17/2022 9.3  4.0 - 15.0 % Final    Eosinophil % 05/17/2022 5.2  0.0 - 8.0 % Final    Basophil % 05/17/2022 0.7  0.0 - 1.9 % Final    Differential Method 05/17/2022 Automated   Final    Cholesterol 05/17/2022 109 (A) 120 - 199 mg/dL Final    Triglycerides 05/17/2022 70  30 - 150 mg/dL Final    HDL 05/17/2022 46  40 - 75 mg/dL Final    LDL Cholesterol 05/17/2022 49.0 (A) 63.0 - 159.0 mg/dL Final    HDL/Cholesterol Ratio 05/17/2022 42.2  20.0 - 50.0 % Final    Total Cholesterol/HDL Ratio 05/17/2022 2.4  2.0 - 5.0 Final    Non-HDL Cholesterol 05/17/2022 63  mg/dL Final    TSH 05/17/2022 2.330  0.400 - 4.000 uIU/mL Final    PSA, Screen 05/17/2022 1.2  0.00 - 4.00 ng/mL Final   (  Accession #: [id number]    Transthoracic echo (TTE) complete  Order# [id number]  Reading physician: Yousuf Lockett MD Ordering physician: Yousuf Lockett MD Study date: 7/30/21       Reason for Exam  Priority: Routine  Dx: S/P MVR (mitral valve replacement) [Z95.2 (ICD-10-CM)]     Result Image Hyperlink     Show images for Echo Color Flow Doppler? Yes    Summary    · The left ventricle is normal in size with mild concentric hypertrophy and low normal systolic function.  · The estimated ejection fraction is 50%.  · Grade II left ventricular diastolic dysfunction.  · Moderate right ventricular enlargement with mildly reduced right ventricular systolic function.  · Severe left atrial enlargement.  · There is a bioprosthetic mitral valve. There is no insufficiency present.  · There is abnormal septal wall motion consistent with post-operative status.  · Mild-to-moderate  aortic regurgitation.  · Mild to moderate tricuspid regurgitation.  · Mild to moderate pulmonic regurgitation.  · There is mild mitral stenosis. The estimated maximum instantaneous gradient across the mitral valve during diastole with a heart rate of 66 bpm is10 mm Hg and the mean gradient is 5.5 mm Hg  · Intermediate central venous pressure (8 mmHg).  · The estimated PA systolic pressure is 42 mmHg.  · There is pulmonary hypertension.         Vitals    Height     Accession #: [id number]    Rodolfo Messina  Holter monitor - 24 hour  Order# [id number]  Reading physician: Tony Gomez MD Ordering physician: Yousuf Lockett MD Study date: 11/4/19       Reason for Exam  Priority: Routine  Dx: S/P MVR (mitral valve replacement) [Z95.2 (ICD-10-CM)]; S/P CABG x 1 [Z95.1 (ICD-10-CM)]; Asymptomatic PVCs [I49.3 (ICD-10-CM)]; Premature atrial complexes [I49.1 (ICD-10-CM)]     Conclusion    · Predominant Rhythm Sinus rhythm with heart rates varying between 56 and 111 bpm with an average of 78 bpm.  · There were very frequent PVCs totalling 7589 and averaging 316.21 per hour. There were 11 couplets. There were 55 bigeminal cycles. There were 1 triplets.  · There were very rare PACs totalling 26 and averaging 1.08 per hour.  · The diary was properly completed. There were rare hookup related artifacts. Overall, the study was of good quality. The tape was adequate (0 days , 24 hours, 0 minutes).  · There was an episode of 2PM EXERCISE reported. The corresponding rhythm strips revealed the following: the rhythm was sinus rhythm at 92 bpm with PVCs.         Performing Clinician    Jenn Abdul, RT         Assessment:     1. S/P MVR (mitral valve replacement)    2. S/P CABG x 1    3. Primary hypertension    4. H/O bacterial endocarditis    5. H/O atrial flutter    6. Chronic systolic congestive heart failure    7. Essential hypertension      Plan:   Rodolfo was seen today for follow-up.    Diagnoses and all orders for this  visit:    S/P MVR (mitral valve replacement)  -     IN OFFICE EKG 12-LEAD (to Muse)    S/P CABG x 1  -     IN OFFICE EKG 12-LEAD (to Muse)    Primary hypertension  -     IN OFFICE EKG 12-LEAD (to Muse)    H/O bacterial endocarditis  -     IN OFFICE EKG 12-LEAD (to Muse)    H/O atrial flutter  -     IN OFFICE EKG 12-LEAD (to Muse)    Chronic systolic congestive heart failure  -     IN OFFICE EKG 12-LEAD (to Muse)    Essential hypertension  -     amLODIPine (NORVASC) 5 MG tablet; Take 1 tablet (5 mg total) by mouth once daily.    Other orders  -     azithromycin (ZITHROMAX) 500 MG tablet; Take 1 tablet (500 mg total) by mouth once daily. Take one tablet an hour before dental work.     Pt is doing well    Same meds--OK to stay on lowered dose of amlodipine 5 mg once a day    Azithromycin 500 mg daily one hour prior to dental work SBE prophylaxis    Follow up in about 6 months (around 12/21/2022).

## 2022-07-18 ENCOUNTER — PES CALL (OUTPATIENT)
Dept: ADMINISTRATIVE | Facility: OTHER | Age: 72
End: 2022-07-18
Payer: MEDICARE

## 2022-08-04 ENCOUNTER — TELEPHONE (OUTPATIENT)
Dept: HEMATOLOGY/ONCOLOGY | Facility: CLINIC | Age: 72
End: 2022-08-04
Payer: MEDICARE

## 2022-08-04 NOTE — PT/OT/SLP PROGRESS
Occupational Therapy   Treatment    Name: Rodolfo Messina  MRN: [id number]  Admitting Diagnosis:  S/P MVR (mitral valve replacement)  12 Days Post-Op    Recommendations:     Discharge Recommendations: rehabilitation facility      Subjective     Communicated with: nsg prior to session.  Pain/Comfort:  · Pain Rating 1: 0/10    Patients cultural, spiritual, U[place]din conflicts given the current situation: none reported    Objective:     Patient found seated in b/s chair with spouse present.       General Precautions: Standard, fall, sternal, aspiration     Occupational Performance:      Functional Mobility/Transfers:  · Patient completed Sit <> Stand Transfer with stand by assistance  with  no assistive device     Activities of Daily Living:  · Grooming: contact guard assistance in stand; set-up seated  · LB Dressing: stand by assistance manage footwear    Patient left seated on sofa with spouse present with nsg notified    [facility] 6 Click:  [facility] Total Score: 16    Treatment & Education:  Pt completed functional mobility in room with HHA/CGA. Pt able to stand to complete g/h skills with CGA x 2 min. Pt required seated rest break to complete g/h skills due to reports of weakness.  Education provided to pt re: sternal precautions and implications on functional activity.   OT also provided education  Re: functional progress and safety with functional mobility/ADL skills including OT POC  Education:    Assessment:     Rodolfo Messina is a 67 y.o. male with a medical diagnosis of S/P MVR (mitral valve replacement).  Performance deficits affecting function are weakness, impaired functional mobilty, gait instability, impaired endurance, impaired balance, impaired self care skills.  Pt with good effort this date, but poor functional endurance noted. Pt reports increased fatigue related to lengthy ST session prior to arrival.  Several rest breaks required throughout session. Pt remains appropriate IP REHAB candidate at this  time as pt is not safe to return home at this time.     Rehab Prognosis:  Good ; patient would benefit from acute skilled OT services to address these deficits and reach maximum level of function.       Plan:     Patient to be seen 5 x/week to address the above listed problems via self-care/home management, therapeutic activities, therapeutic exercises  · Plan of Care Expires: 06/30/18  · Plan of Care Reviewed with: patient, spouse    This Plan of care has been discussed with the patient who was involved in its development and understands and is in agreement with the identified goals and treatment plan    GOALS:    Occupational Therapy Goals        Problem: Occupational Therapy Goal    Goal Priority Disciplines Outcome Interventions   Occupational Therapy Goal     OT, PT/OT Ongoing (interventions implemented as appropriate)    Description:  Goals to be met by: 6/10/18     Patient will increase functional independence with ADLs by performing:    Feeding with Modified [place].  UE Dressing with Supervision.  LE Dressing with Supervision.  Grooming while standing at sink with Supervision.  Toileting from toilet with Supervision for hygiene and clothing management.   Toilet transfer to toilet with Supervision.                       Time Tracking:     OT Date of Treatment: 06/06/18  OT Start Time: 1030  OT Stop Time: 1055  OT Total Time (min): 25 min    Billable Minutes:Self Care/Home Management 15    MILTON Amaya  6/6/2018     Azelaic Acid Counseling: Patient counseled that medicine may cause skin irritation and to avoid applying near the eyes.  In the event of skin irritation, the patient was advised to reduce the amount of the drug applied or use it less frequently.   The patient verbalized understanding of the proper use and possible adverse effects of azelaic acid.  All of the patient's questions and concerns were addressed.

## 2022-08-04 NOTE — TELEPHONE ENCOUNTER
"----- Message from Aditya Connolly sent at 8/4/2022  2:10 PM CDT -----  Scheduling Request        Patient Status: Est      Scheduling Appt: NFL, Annual F/u      Time/Date Preference: After 9/29      Contact Preference?: [phone] (Mobile)      Treating Provider: Linh      Do you feel you need to be seen today? No        Additional Notes:  "Thank you for all that you do for our patients"     "

## 2022-08-17 ENCOUNTER — HOSPITAL ENCOUNTER (EMERGENCY)
Facility: HOSPITAL | Age: 72
Discharge: HOME | End: 2022-08-17
Attending: EMERGENCY MEDICINE
Payer: MEDICARE

## 2022-08-17 VITALS
HEART RATE: 77 BPM | OXYGEN SATURATION: 95 % | RESPIRATION RATE: 20 BRPM | WEIGHT: 168 LBS | HEIGHT: 70 IN | TEMPERATURE: 99 F | SYSTOLIC BLOOD PRESSURE: 169 MMHG | BODY MASS INDEX: 24.05 KG/M2 | DIASTOLIC BLOOD PRESSURE: 73 MMHG
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
## 2022-08-17 DIAGNOSIS — S16.1XXA ACUTE CERVICAL MYOFASCIAL STRAIN, INITIAL ENCOUNTER: Primary | ICD-10-CM

## 2022-08-17 DIAGNOSIS — Z98.1 HISTORY OF FUSION OF CERVICAL SPINE: ICD-10-CM

## 2022-08-17 PROCEDURE — 99284 EMERGENCY DEPT VISIT MOD MDM: CPT | Mod: 25,ER

## 2022-08-17 RX ORDER — CYCLOBENZAPRINE HCL 10 MG
10 TABLET ORAL 3 TIMES DAILY PRN
Qty: 15 TABLET | Refills: 0 | Status: SHIPPED | OUTPATIENT
Start: 2022-08-17 | End: 2022-08-17 | Stop reason: ALTCHOICE

## 2022-08-17 RX ORDER — MELOXICAM 7.5 MG/1
7.5 TABLET ORAL DAILY
Qty: 7 TABLET | Refills: 0 | Status: SHIPPED | OUTPATIENT
Start: 2022-08-17 | End: 2022-08-17 | Stop reason: SDUPTHER

## 2022-08-17 RX ORDER — MELOXICAM 7.5 MG/1
7.5 TABLET ORAL DAILY
Qty: 7 TABLET | Refills: 0 | Status: SHIPPED | OUTPATIENT
Start: 2022-08-17 | End: 2022-08-24

## 2022-08-17 RX ORDER — TIZANIDINE 2 MG/1
4 TABLET ORAL EVERY 8 HOURS PRN
Qty: 15 TABLET | Refills: 0 | Status: SHIPPED | OUTPATIENT
Start: 2022-08-17 | End: 2022-08-17 | Stop reason: SDUPTHER

## 2022-08-17 RX ORDER — TIZANIDINE 2 MG/1
4 TABLET ORAL EVERY 8 HOURS PRN
Qty: 15 TABLET | Refills: 0 | Status: SHIPPED | OUTPATIENT
Start: 2022-08-17 | End: 2022-08-27

## 2022-08-18 ENCOUNTER — HOSPITAL ENCOUNTER (OUTPATIENT)
Dept: RADIOLOGY | Facility: HOSPITAL | Age: 72
Discharge: HOME | End: 2022-08-18
Attending: NURSE PRACTITIONER
Payer: MEDICARE

## 2022-08-18 ENCOUNTER — OFFICE VISIT (OUTPATIENT)
Dept: HEMATOLOGY/ONCOLOGY | Facility: CLINIC | Age: 72
End: 2022-08-18
Payer: MEDICARE

## 2022-08-18 VITALS
BODY MASS INDEX: 24.59 KG/M2 | HEIGHT: 70 IN | OXYGEN SATURATION: 95 % | TEMPERATURE: 99 F | HEART RATE: 76 BPM | RESPIRATION RATE: 17 BRPM | SYSTOLIC BLOOD PRESSURE: 138 MMHG | WEIGHT: 171.75 LBS | DIASTOLIC BLOOD PRESSURE: 63 MMHG

## 2022-08-18 DIAGNOSIS — C85.10 B-CELL LYMPHOMA, UNSPECIFIED B-CELL LYMPHOMA TYPE, UNSPECIFIED BODY REGION: Primary | ICD-10-CM

## 2022-08-18 DIAGNOSIS — D64.9 ANEMIA, UNSPECIFIED TYPE: ICD-10-CM

## 2022-08-18 DIAGNOSIS — K05.219 ABSCESS OF UPPER GUM: ICD-10-CM

## 2022-08-18 DIAGNOSIS — D69.6 THROMBOCYTOPENIA, UNSPECIFIED: ICD-10-CM

## 2022-08-18 PROCEDURE — 3078F DIAST BP <80 MM HG: CPT | Mod: CPTII,S$GLB,, | Performed by: NURSE PRACTITIONER

## 2022-08-18 PROCEDURE — 99499 UNLISTED E&M SERVICE: CPT | Mod: S$GLB,,, | Performed by: NURSE PRACTITIONER

## 2022-08-18 PROCEDURE — 1159F MED LIST DOCD IN RCRD: CPT | Mod: CPTII,S$GLB,, | Performed by: NURSE PRACTITIONER

## 2022-08-18 PROCEDURE — 70486 CT MAXILLOFACIAL W/O DYE: CPT | Mod: TC

## 2022-08-18 PROCEDURE — 70486 CT MAXILLOFACIAL WITHOUT CONTRAST: ICD-10-PCS | Mod: 26,,, | Performed by: RADIOLOGY

## 2022-08-18 PROCEDURE — 1159F PR MEDICATION LIST DOCUMENTED IN MEDICAL RECORD: ICD-10-PCS | Mod: CPTII,S$GLB,, | Performed by: NURSE PRACTITIONER

## 2022-08-18 PROCEDURE — 3008F PR BODY MASS INDEX (BMI) DOCUMENTED: ICD-10-PCS | Mod: CPTII,S$GLB,, | Performed by: NURSE PRACTITIONER

## 2022-08-18 PROCEDURE — 99999 PR PBB SHADOW E&M-EST. PATIENT-LVL III: CPT | Mod: PBBFAC,,, | Performed by: NURSE PRACTITIONER

## 2022-08-18 PROCEDURE — 99214 PR OFFICE/OUTPT VISIT, EST, LEVL IV, 30-39 MIN: ICD-10-PCS | Mod: S$GLB,,, | Performed by: NURSE PRACTITIONER

## 2022-08-18 PROCEDURE — 1160F RVW MEDS BY RX/DR IN RCRD: CPT | Mod: CPTII,S$GLB,, | Performed by: NURSE PRACTITIONER

## 2022-08-18 PROCEDURE — 1160F PR REVIEW ALL MEDS BY PRESCRIBER/CLIN PHARMACIST DOCUMENTED: ICD-10-PCS | Mod: CPTII,S$GLB,, | Performed by: NURSE PRACTITIONER

## 2022-08-18 PROCEDURE — 3075F SYST BP GE 130 - 139MM HG: CPT | Mod: CPTII,S$GLB,, | Performed by: NURSE PRACTITIONER

## 2022-08-18 PROCEDURE — 99999 PR PBB SHADOW E&M-EST. PATIENT-LVL III: ICD-10-PCS | Mod: PBBFAC,,, | Performed by: NURSE PRACTITIONER

## 2022-08-18 PROCEDURE — 70486 CT MAXILLOFACIAL W/O DYE: CPT | Mod: 26,,, | Performed by: RADIOLOGY

## 2022-08-18 PROCEDURE — 99499 RISK ADDL DX/OHS AUDIT: ICD-10-PCS | Mod: S$GLB,,, | Performed by: NURSE PRACTITIONER

## 2022-08-18 PROCEDURE — 3075F PR MOST RECENT SYSTOLIC BLOOD PRESS GE 130-139MM HG: ICD-10-PCS | Mod: CPTII,S$GLB,, | Performed by: NURSE PRACTITIONER

## 2022-08-18 PROCEDURE — 99214 OFFICE O/P EST MOD 30 MIN: CPT | Mod: S$GLB,,, | Performed by: NURSE PRACTITIONER

## 2022-08-18 PROCEDURE — 1126F PR PAIN SEVERITY QUANTIFIED, NO PAIN PRESENT: ICD-10-PCS | Mod: CPTII,S$GLB,, | Performed by: NURSE PRACTITIONER

## 2022-08-18 PROCEDURE — 3008F BODY MASS INDEX DOCD: CPT | Mod: CPTII,S$GLB,, | Performed by: NURSE PRACTITIONER

## 2022-08-18 PROCEDURE — 3078F PR MOST RECENT DIASTOLIC BLOOD PRESSURE < 80 MM HG: ICD-10-PCS | Mod: CPTII,S$GLB,, | Performed by: NURSE PRACTITIONER

## 2022-08-18 PROCEDURE — 1126F AMNT PAIN NOTED NONE PRSNT: CPT | Mod: CPTII,S$GLB,, | Performed by: NURSE PRACTITIONER

## 2022-08-18 RX ORDER — CLINDAMYCIN HYDROCHLORIDE 300 MG/1
300 CAPSULE ORAL 3 TIMES DAILY
Qty: 30 CAPSULE | Refills: 0 | Status: SHIPPED | OUTPATIENT
Start: 2022-08-18 | End: 2022-08-28
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
## 2022-08-19 ENCOUNTER — TELEPHONE (OUTPATIENT)
Dept: HEMATOLOGY/ONCOLOGY | Facility: CLINIC | Age: 72
End: 2022-08-19
Payer: MEDICARE

## 2022-08-19 NOTE — TELEPHONE ENCOUNTER
----- Message from Radha Lewis sent at 8/19/2022  2:02 PM CDT -----  Regarding: Results  Contact: [phone]  Patient Rodolfo is calling. Patient stated he was told he would receive a call today in ref to his test results from yesterday. Please call patient at [phone]    Thank You

## 2022-08-19 NOTE — TELEPHONE ENCOUNTER
Left voicemail letting patient know that Janette is out of the office until Monday. Will ask her to call with results then.

## 2022-09-28 ENCOUNTER — PES CALL (OUTPATIENT)
Dept: ADMINISTRATIVE | Facility: CLINIC | Age: 72
End: 2022-09-28
Payer: MEDICARE

## 2022-10-18 ENCOUNTER — TELEPHONE (OUTPATIENT)
Dept: FAMILY MEDICINE | Facility: CLINIC | Age: 72
End: 2022-10-18
Payer: MEDICARE

## 2022-10-18 RX ORDER — MIRTAZAPINE 30 MG/1
30 TABLET, FILM COATED ORAL NIGHTLY
Qty: 30 TABLET | Refills: 0 | Status: SHIPPED | OUTPATIENT
Start: 2022-10-18 | End: 2022-11-17

## 2022-10-18 RX ORDER — MIRTAZAPINE 30 MG/1
30 TABLET, FILM COATED ORAL NIGHTLY
Qty: 90 TABLET | Refills: 3 | Status: SHIPPED | OUTPATIENT
Start: 2022-10-18 | End: 2023-11-06

## 2022-10-18 NOTE — TELEPHONE ENCOUNTER
Remeron sent to [facility] and to [facility]-please make an appointment-it has been over year.

## 2022-10-18 NOTE — TELEPHONE ENCOUNTER
Can you refill the Remeron 1 QHS  He only has 4 left    Send 2 weeks to [facility] then 90 day to mail order  Raimundo/marcio    Last visit 5/2022    Given to pt for the hand shaking

## 2022-11-09 ENCOUNTER — PES CALL (OUTPATIENT)
Dept: ADMINISTRATIVE | Facility: OTHER | Age: 72
End: 2022-11-09
Payer: MEDICARE

## 2022-11-11 PROBLEM — D64.9 ANEMIA: Status: ACTIVE | Noted: 2022-11-11

## 2022-11-11 PROBLEM — C85.10 B-CELL LYMPHOMA: Status: ACTIVE | Noted: 2022-11-11

## 2022-11-11 NOTE — PROGRESS NOTES
Rodolfo Msesina presented for a  Medicare AWV and comprehensive Health Risk Assessment today. The following components were reviewed and updated:    Medical history  Family History  Social history  Allergies and Current Medications  Health Risk Assessment  Health Maintenance  Care Team         ** See Completed Assessments for Annual Wellness Visit within the encounter summary.**         The following assessments were completed:  Living Situation  CAGE  Depression Screening  Timed Get Up and Go  Whisper Test  Cognitive Function Screening  Nutrition Screening  ADL Screening  PAQ Screening      Review for Opioid Screening: Pt does not have Rx for Opioids      Review for Substance Use Disorders: Patient does not use substance        Current Outpatient Medications:     acetaminophen (TYLENOL) 650 MG TbSR, Take 650 mg by mouth every 8 (eight) hours., Disp: , Rfl:     amLODIPine (NORVASC) 5 MG tablet, Take 1 tablet (5 mg total) by mouth once daily., Disp: 90 tablet, Rfl: 3    ascorbic acid, vitamin C, (VITAMIN C) 1000 MG tablet, Take 1,000 mg by mouth every other day., Disp: , Rfl:     aspirin (ECOTRIN) 81 MG EC tablet, Take 81 mg by mouth once daily., Disp: , Rfl:     atorvastatin (LIPITOR) 40 MG tablet, GIVE 1 TABLET VIA G TUBE DAILY, Disp: 90 tablet, Rfl: 3    azithromycin (ZITHROMAX) 500 MG tablet, Take 1 tablet (500 mg total) by mouth once daily. Take one tablet an hour before dental work., Disp: 5 tablet, Rfl: 3    cetirizine (ZYRTEC) 10 MG tablet, Take 10 mg by mouth once daily., Disp: , Rfl:     fluticasone propionate (FLONASE) 50 mcg/actuation nasal spray, 1 spray by Each Nostril route once daily., Disp: , Rfl:     gentamicin (GARAMYCIN) 0.3 % ophthalmic solution, , Disp: , Rfl:     lactose-reduced food (ENSURE HIGH PROTEIN-MUSCLE ORAL), Take by mouth once daily. , Disp: , Rfl:     magnesium 250 mg Tab, Take by mouth once daily., Disp: , Rfl:     melatonin 10 mg Tab, Take by mouth as needed. , Disp: , Rfl:      "mirtazapine (REMERON) 30 MG tablet, Take 1 tablet (30 mg total) by mouth every evening., Disp: 90 tablet, Rfl: 3    multivit-min/FA/lycopen/lutein (ADULTS 50 PLUS ORAL), Take by mouth once daily. , Disp: , Rfl:     pantoprazole (PROTONIX) 40 MG tablet, TAKE 1 TABLET EVERY DAY, Disp: 90 tablet, Rfl: 3    SAW PALMETTO ORAL, Take 450 mg by mouth once daily., Disp: , Rfl:     tamsulosin (FLOMAX) 0.4 mg Cap, Take 2 capsules (0.8 mg total) by mouth once daily. (Patient taking differently: Take 0.4 mg by mouth once daily.), Disp: 180 capsule, Rfl: 0       Vitals:    11/21/22 1309 11/21/22 1328   BP: (!) 155/82 (!) 154/78   Pulse: 72    SpO2: 96%    Weight: 78.4 kg (172 lb 13.5 oz)    Height: 5' 10" (1.778 m)    PainSc: 0-No pain       Physical Exam  Vitals and nursing note reviewed.   Constitutional:       General: He is not in acute distress.     Appearance: Normal appearance. He is not ill-appearing.   HENT:      Head: Normocephalic and atraumatic.   Eyes:      General: No scleral icterus.        Right eye: No discharge.         Left eye: No discharge.      Extraocular Movements: Extraocular movements intact.      Conjunctiva/sclera: Conjunctivae normal.   Cardiovascular:      Rate and Rhythm: Normal rate and regular rhythm.      Heart sounds: Normal heart sounds. No murmur heard.  Pulmonary:      Effort: Pulmonary effort is normal. No respiratory distress.      Breath sounds: Normal breath sounds. No wheezing, rhonchi or rales.   Musculoskeletal:      Cervical back: Normal range of motion.      Right lower leg: No edema.      Left lower leg: No edema.   Skin:     General: Skin is warm and dry.      Findings: No rash.   Neurological:      Mental Status: He is alert and oriented to person, place, and time.      Motor: Tremor (bilat hand tremor) present.   Psychiatric:         Mood and Affect: Mood normal.         Behavior: Behavior normal. Behavior is cooperative.         Cognition and Memory: Cognition and memory normal. "           Diagnoses and health risks identified today and associated recommendations/orders:    1. Encounter for preventive health examination  - Chart reviewed. Problem list updated. Discussed current medical diagnosis, current medications, medical/surgical/family/social history; updated provider list; documented vital signs; identified any cognitive impairment; and updated risk factor list. Addressed any outstanding health maintenance. Provided patient with personalized health advice. Continue to follow up with PCP and any specialists.   - I recommend routine dentist appointment ; I recommend patient call dentist today to discuss tooth pain chronic over 3 months    2. B-cell lymphoma, unspecified B-cell lymphoma type, unspecified body region  Chronic; stable on current treatment plan; follow up with PCP  - follows with hematology     3. Stage 3a chronic kidney disease  Chronic; stable on current treatment plan; follow up with PCP  - recommend avoiding NSAIDS     4. Thrombocytopenia, unspecified  Chronic; stable on current treatment plan; follow up with PCP  - follows with hematology     5. Chronic systolic congestive heart failure  Chronic; stable on current treatment plan; follow up with PCP  - recommend low sodium diet     6. Benign prostatic hyperplasia, unspecified whether lower urinary tract symptoms present  Chronic; stable on current treatment plan; follow up with PCP  - taking flomax    7. Hypertension, unspecified type  Chronic; BP elevated today; follow up with PCP  - currently taking amlodipine 5mg daily  - recommend avoiding NSAIDS   - patient reports feeling nervous prior to visit and anxiety related to running late; recommend he check blood pressure once home and relaxed; if BP remains elevated over 150/80 I recommend taking amlodipine early this evening.  - I will forward elevated BP to cardiology; patient asked to contact cardiology/PCP if BP remains elevated at home    8. Compression fracture of  C6 vertebra with routine healing  Chronic; stable on current treatment plan; follow up with PCP    9. Anxiety  Chronic; stable on current treatment plan; follow up with PCP      10. Anemia, unspecified type  Chronic; stable on current treatment plan; follow up with PCP  - follows with hematology     11. Tremor  Chronic; stable on current treatment plan; follow up with PCP  - saw neuro in the past    12. Bilateral hearing loss, unspecified hearing loss type  Chronic; stable on current treatment plan; follow up with PCP  - has hearing aids     13. BMI 24.0-24.9, adult  - Recommendation for healthy diet and increasing exercise as tolerated with goal of 150min/week . Recommend weight loss        Provided Rodolfo with a 5-10 year written screening schedule and personal prevention plan. Recommendations were developed using the USPSTF age appropriate recommendations. Education, counseling, and referrals were provided as needed. After Visit Summary printed and given to patient which includes a list of additional screenings\tests needed.    Follow up in about 1 year (around 11/21/2023) for your next annual wellness visit.    Nori Miller, JEREMIASP-C    Advance Care Planning       I offered to discuss advanced care planning, including how to pick a person who would make decisions for you if you were unable to make them for yourself, called a health care power of , and what kind of decisions you might make such as use of life sustaining treatments such as ventilators and tube feeding when faced with a life limiting illness recorded on a living will that they will need to know. (How you want to be cared for as you near the end of your natural life)     X  Patient is unwilling to engage in a discussion regarding advance directives at this time.

## 2022-11-21 ENCOUNTER — OFFICE VISIT (OUTPATIENT)
Dept: INTERNAL MEDICINE | Facility: CLINIC | Age: 72
End: 2022-11-21
Payer: MEDICARE

## 2022-11-21 VITALS
HEIGHT: 70 IN | BODY MASS INDEX: 24.74 KG/M2 | DIASTOLIC BLOOD PRESSURE: 78 MMHG | SYSTOLIC BLOOD PRESSURE: 154 MMHG | OXYGEN SATURATION: 96 % | HEART RATE: 72 BPM | WEIGHT: 172.81 LBS

## 2022-11-21 DIAGNOSIS — Z00.00 ENCOUNTER FOR PREVENTIVE HEALTH EXAMINATION: Primary | ICD-10-CM

## 2022-11-21 DIAGNOSIS — C85.10 B-CELL LYMPHOMA, UNSPECIFIED B-CELL LYMPHOMA TYPE, UNSPECIFIED BODY REGION: ICD-10-CM

## 2022-11-21 DIAGNOSIS — D69.6 THROMBOCYTOPENIA, UNSPECIFIED: ICD-10-CM

## 2022-11-21 DIAGNOSIS — S12.590D COMPRESSION FRACTURE OF C6 VERTEBRA WITH ROUTINE HEALING: ICD-10-CM

## 2022-11-21 DIAGNOSIS — H91.93 BILATERAL HEARING LOSS, UNSPECIFIED HEARING LOSS TYPE: ICD-10-CM

## 2022-11-21 DIAGNOSIS — I10 HYPERTENSION, UNSPECIFIED TYPE: Chronic | ICD-10-CM

## 2022-11-21 DIAGNOSIS — D64.9 ANEMIA, UNSPECIFIED TYPE: ICD-10-CM

## 2022-11-21 DIAGNOSIS — N40.0 BENIGN PROSTATIC HYPERPLASIA, UNSPECIFIED WHETHER LOWER URINARY TRACT SYMPTOMS PRESENT: ICD-10-CM

## 2022-11-21 DIAGNOSIS — R25.1 TREMOR: Chronic | ICD-10-CM

## 2022-11-21 DIAGNOSIS — I50.22 CHRONIC SYSTOLIC CONGESTIVE HEART FAILURE: ICD-10-CM

## 2022-11-21 DIAGNOSIS — F41.9 ANXIETY: ICD-10-CM

## 2022-11-21 DIAGNOSIS — N18.31 STAGE 3A CHRONIC KIDNEY DISEASE: ICD-10-CM

## 2022-11-21 PROCEDURE — 99499 RISK ADDL DX/OHS AUDIT: ICD-10-PCS | Mod: HCNC,S$GLB,, | Performed by: NURSE PRACTITIONER

## 2022-11-21 PROCEDURE — 1160F RVW MEDS BY RX/DR IN RCRD: CPT | Mod: CPTII,S$GLB,, | Performed by: NURSE PRACTITIONER

## 2022-11-21 PROCEDURE — 1170F FXNL STATUS ASSESSED: CPT | Mod: CPTII,S$GLB,, | Performed by: NURSE PRACTITIONER

## 2022-11-21 PROCEDURE — 3077F SYST BP >= 140 MM HG: CPT | Mod: CPTII,S$GLB,, | Performed by: NURSE PRACTITIONER

## 2022-11-21 PROCEDURE — 1101F PR PT FALLS ASSESS DOC 0-1 FALLS W/OUT INJ PAST YR: ICD-10-PCS | Mod: CPTII,S$GLB,, | Performed by: NURSE PRACTITIONER

## 2022-11-21 PROCEDURE — 1101F PT FALLS ASSESS-DOCD LE1/YR: CPT | Mod: CPTII,S$GLB,, | Performed by: NURSE PRACTITIONER

## 2022-11-21 PROCEDURE — 99999 PR PBB SHADOW E&M-EST. PATIENT-LVL V: ICD-10-PCS | Mod: PBBFAC,,, | Performed by: NURSE PRACTITIONER

## 2022-11-21 PROCEDURE — 3008F BODY MASS INDEX DOCD: CPT | Mod: CPTII,S$GLB,, | Performed by: NURSE PRACTITIONER

## 2022-11-21 PROCEDURE — 1159F PR MEDICATION LIST DOCUMENTED IN MEDICAL RECORD: ICD-10-PCS | Mod: CPTII,S$GLB,, | Performed by: NURSE PRACTITIONER

## 2022-11-21 PROCEDURE — 99499 UNLISTED E&M SERVICE: CPT | Mod: HCNC,S$GLB,, | Performed by: NURSE PRACTITIONER

## 2022-11-21 PROCEDURE — 1126F PR PAIN SEVERITY QUANTIFIED, NO PAIN PRESENT: ICD-10-PCS | Mod: CPTII,S$GLB,, | Performed by: NURSE PRACTITIONER

## 2022-11-21 PROCEDURE — 1159F MED LIST DOCD IN RCRD: CPT | Mod: CPTII,S$GLB,, | Performed by: NURSE PRACTITIONER

## 2022-11-21 PROCEDURE — 3288F FALL RISK ASSESSMENT DOCD: CPT | Mod: CPTII,S$GLB,, | Performed by: NURSE PRACTITIONER

## 2022-11-21 PROCEDURE — G0439 PR MEDICARE ANNUAL WELLNESS SUBSEQUENT VISIT: ICD-10-PCS | Mod: S$GLB,,, | Performed by: NURSE PRACTITIONER

## 2022-11-21 PROCEDURE — 99999 PR PBB SHADOW E&M-EST. PATIENT-LVL V: CPT | Mod: PBBFAC,,, | Performed by: NURSE PRACTITIONER

## 2022-11-21 PROCEDURE — 1126F AMNT PAIN NOTED NONE PRSNT: CPT | Mod: CPTII,S$GLB,, | Performed by: NURSE PRACTITIONER

## 2022-11-21 PROCEDURE — 3078F DIAST BP <80 MM HG: CPT | Mod: CPTII,S$GLB,, | Performed by: NURSE PRACTITIONER

## 2022-11-21 PROCEDURE — 3078F PR MOST RECENT DIASTOLIC BLOOD PRESSURE < 80 MM HG: ICD-10-PCS | Mod: CPTII,S$GLB,, | Performed by: NURSE PRACTITIONER

## 2022-11-21 PROCEDURE — 1160F PR REVIEW ALL MEDS BY PRESCRIBER/CLIN PHARMACIST DOCUMENTED: ICD-10-PCS | Mod: CPTII,S$GLB,, | Performed by: NURSE PRACTITIONER

## 2022-11-21 PROCEDURE — 3077F PR MOST RECENT SYSTOLIC BLOOD PRESSURE >= 140 MM HG: ICD-10-PCS | Mod: CPTII,S$GLB,, | Performed by: NURSE PRACTITIONER

## 2022-11-21 PROCEDURE — 1170F PR FUNCTIONAL STATUS ASSESSED: ICD-10-PCS | Mod: CPTII,S$GLB,, | Performed by: NURSE PRACTITIONER

## 2022-11-21 PROCEDURE — 3288F PR FALLS RISK ASSESSMENT DOCUMENTED: ICD-10-PCS | Mod: CPTII,S$GLB,, | Performed by: NURSE PRACTITIONER

## 2022-11-21 PROCEDURE — G0439 PPPS, SUBSEQ VISIT: HCPCS | Mod: S$GLB,,, | Performed by: NURSE PRACTITIONER

## 2022-11-21 PROCEDURE — 3008F PR BODY MASS INDEX (BMI) DOCUMENTED: ICD-10-PCS | Mod: CPTII,S$GLB,, | Performed by: NURSE PRACTITIONER

## 2022-11-21 NOTE — PATIENT INSTRUCTIONS
Counseling and Referral of Other Preventative  (Italic type indicates deductible and co-insurance are waived)    Patient Name: Rodolfo Messina  Today's Date: 11/21/2022    Health Maintenance         Date Due Completion Date    Influenza Vaccine (1) 06/30/2023 (Originally 9/1/2022) 10/31/2019 (Declined)    Override on 10/31/2019: Declined    Shingles Vaccine (1 of 2) 11/11/2023 (Originally 6/15/1969) ---    COVID-19 Vaccine (1) 11/11/2023 (Originally 1950) ---    Pneumococcal Vaccines (Age 65+) (1 - PCV) 11/11/2023 (Originally 6/15/1956) ---    TETANUS VACCINE 08/24/2026 8/24/2016    Lipid Panel 05/17/2027 5/17/2022    Colorectal Cancer Screening 02/01/2028 2/1/2018    Override on 6/15/2017: Done (negative)          No orders of the defined types were placed in this encounter.      The following information is provided to all patients.  This information is to help you find resources for any of the problems found today that may be affecting your health:                Living healthy guide: www.[facility].[place].gov      Understanding Diabetes: www.diabetes.org      Eating healthy: www.cdc.gov/healthyweight      CDC home safety checklist: www.cdc.gov/steadi/patient.html      Agency on Aging: www.goea.[place].[facility]      Alcoholics anonymous (AA): www.aa.org      Physical Activity: www.najma.nih.gov/xv7leun      Tobacco use: www.quitwithusla.org

## 2022-11-21 NOTE — Clinical Note
Good evening Dr Claudio and Dr Lockett, I saw a mutual patient of both of you today for a medicare wellness visit. His BP is elevated 150s/80s. Per last Cardiology note - patient had decreased amlodipine form 5mg BID to 5mg once daily and BP controlled in June per note. Patient reports home BP 120s/80s. I asked him to go home and check BP once relaxed and take amlodipine early this evening if it remains elevated.  He is also complaining of mouth pain for which he was seen approx 3 months ago by dentist, then by hematology. I asked him to make another appt with dentist to discuss discomfort.  I just wanted to let you both know about blood pressure, incase you would like to see him in office for BP recheck prior to end of the year.  I see on chart review that he did have some elevated Bps in the past. He has upcoming cardiology appt in January.  Thanks

## 2023-01-09 ENCOUNTER — TELEPHONE (OUTPATIENT)
Dept: CARDIOLOGY | Facility: CLINIC | Age: 73
End: 2023-01-09
Payer: MEDICARE

## 2023-01-09 NOTE — TELEPHONE ENCOUNTER
Returned call to patient. Someone was picking up the phone and hanging it up. No blood work was ordered by Dr Lockett.      ----- Message from Lisa Hodge sent at 1/9/2023  9:55 AM CST -----  Name of Who is Calling:BINDU RIGGS [[id number]]              What is the request in detail:Requesting a call back to verify if blood work has to be done.               Can the clinic reply by [facility]:              What Number to Call Back if not in [facility]:[phone]

## 2023-01-10 ENCOUNTER — OFFICE VISIT (OUTPATIENT)
Dept: CARDIOLOGY | Facility: CLINIC | Age: 73
End: 2023-01-10
Payer: MEDICARE

## 2023-01-10 VITALS
HEIGHT: 70 IN | SYSTOLIC BLOOD PRESSURE: 126 MMHG | WEIGHT: 167 LBS | BODY MASS INDEX: 23.91 KG/M2 | OXYGEN SATURATION: 95 % | HEART RATE: 73 BPM | DIASTOLIC BLOOD PRESSURE: 64 MMHG

## 2023-01-10 DIAGNOSIS — I49.3 PVC'S (PREMATURE VENTRICULAR CONTRACTIONS): ICD-10-CM

## 2023-01-10 DIAGNOSIS — Z86.79 H/O BACTERIAL ENDOCARDITIS: ICD-10-CM

## 2023-01-10 DIAGNOSIS — I10 PRIMARY HYPERTENSION: Chronic | ICD-10-CM

## 2023-01-10 DIAGNOSIS — Z86.79 H/O ATRIAL FLUTTER: ICD-10-CM

## 2023-01-10 DIAGNOSIS — Z95.2 S/P MVR (MITRAL VALVE REPLACEMENT): Primary | ICD-10-CM

## 2023-01-10 DIAGNOSIS — G25.0 ESSENTIAL TREMOR: ICD-10-CM

## 2023-01-10 DIAGNOSIS — Z95.1 S/P CABG X 1: ICD-10-CM

## 2023-01-10 PROCEDURE — 99999 PR PBB SHADOW E&M-EST. PATIENT-LVL III: CPT | Mod: PBBFAC,HCNC,, | Performed by: INTERNAL MEDICINE

## 2023-01-10 PROCEDURE — 1160F PR REVIEW ALL MEDS BY PRESCRIBER/CLIN PHARMACIST DOCUMENTED: ICD-10-PCS | Mod: HCNC,CPTII,S$GLB, | Performed by: INTERNAL MEDICINE

## 2023-01-10 PROCEDURE — 99999 PR PBB SHADOW E&M-EST. PATIENT-LVL III: ICD-10-PCS | Mod: PBBFAC,HCNC,, | Performed by: INTERNAL MEDICINE

## 2023-01-10 PROCEDURE — 3078F DIAST BP <80 MM HG: CPT | Mod: HCNC,CPTII,S$GLB, | Performed by: INTERNAL MEDICINE

## 2023-01-10 PROCEDURE — 93000 ELECTROCARDIOGRAM COMPLETE: CPT | Mod: HCNC,S$GLB,, | Performed by: INTERNAL MEDICINE

## 2023-01-10 PROCEDURE — 1159F MED LIST DOCD IN RCRD: CPT | Mod: HCNC,CPTII,S$GLB, | Performed by: INTERNAL MEDICINE

## 2023-01-10 PROCEDURE — 1159F PR MEDICATION LIST DOCUMENTED IN MEDICAL RECORD: ICD-10-PCS | Mod: HCNC,CPTII,S$GLB, | Performed by: INTERNAL MEDICINE

## 2023-01-10 PROCEDURE — 93000 EKG 12-LEAD: ICD-10-PCS | Mod: HCNC,S$GLB,, | Performed by: INTERNAL MEDICINE

## 2023-01-10 PROCEDURE — 3288F FALL RISK ASSESSMENT DOCD: CPT | Mod: HCNC,CPTII,S$GLB, | Performed by: INTERNAL MEDICINE

## 2023-01-10 PROCEDURE — 3008F PR BODY MASS INDEX (BMI) DOCUMENTED: ICD-10-PCS | Mod: HCNC,CPTII,S$GLB, | Performed by: INTERNAL MEDICINE

## 2023-01-10 PROCEDURE — 1101F PT FALLS ASSESS-DOCD LE1/YR: CPT | Mod: HCNC,CPTII,S$GLB, | Performed by: INTERNAL MEDICINE

## 2023-01-10 PROCEDURE — 1126F PR PAIN SEVERITY QUANTIFIED, NO PAIN PRESENT: ICD-10-PCS | Mod: HCNC,CPTII,S$GLB, | Performed by: INTERNAL MEDICINE

## 2023-01-10 PROCEDURE — 3288F PR FALLS RISK ASSESSMENT DOCUMENTED: ICD-10-PCS | Mod: HCNC,CPTII,S$GLB, | Performed by: INTERNAL MEDICINE

## 2023-01-10 PROCEDURE — 99499 RISK ADDL DX/OHS AUDIT: ICD-10-PCS | Mod: HCNC,S$GLB,, | Performed by: INTERNAL MEDICINE

## 2023-01-10 PROCEDURE — 1126F AMNT PAIN NOTED NONE PRSNT: CPT | Mod: HCNC,CPTII,S$GLB, | Performed by: INTERNAL MEDICINE

## 2023-01-10 PROCEDURE — 99499 UNLISTED E&M SERVICE: CPT | Mod: HCNC,S$GLB,, | Performed by: INTERNAL MEDICINE

## 2023-01-10 PROCEDURE — 99214 OFFICE O/P EST MOD 30 MIN: CPT | Mod: HCNC,25,S$GLB, | Performed by: INTERNAL MEDICINE

## 2023-01-10 PROCEDURE — 3074F SYST BP LT 130 MM HG: CPT | Mod: HCNC,CPTII,S$GLB, | Performed by: INTERNAL MEDICINE

## 2023-01-10 PROCEDURE — 3074F PR MOST RECENT SYSTOLIC BLOOD PRESSURE < 130 MM HG: ICD-10-PCS | Mod: HCNC,CPTII,S$GLB, | Performed by: INTERNAL MEDICINE

## 2023-01-10 PROCEDURE — 1101F PR PT FALLS ASSESS DOC 0-1 FALLS W/OUT INJ PAST YR: ICD-10-PCS | Mod: HCNC,CPTII,S$GLB, | Performed by: INTERNAL MEDICINE

## 2023-01-10 PROCEDURE — 1160F RVW MEDS BY RX/DR IN RCRD: CPT | Mod: HCNC,CPTII,S$GLB, | Performed by: INTERNAL MEDICINE

## 2023-01-10 PROCEDURE — 99214 PR OFFICE/OUTPT VISIT, EST, LEVL IV, 30-39 MIN: ICD-10-PCS | Mod: HCNC,25,S$GLB, | Performed by: INTERNAL MEDICINE

## 2023-01-10 PROCEDURE — 3078F PR MOST RECENT DIASTOLIC BLOOD PRESSURE < 80 MM HG: ICD-10-PCS | Mod: HCNC,CPTII,S$GLB, | Performed by: INTERNAL MEDICINE

## 2023-01-10 PROCEDURE — 3008F BODY MASS INDEX DOCD: CPT | Mod: HCNC,CPTII,S$GLB, | Performed by: INTERNAL MEDICINE

## 2023-01-10 NOTE — PROGRESS NOTES
Subjective:      Patient ID: Rodolfo Messina is a 72 y.o. male.    Chief Complaint: Follow-up    HPI:  Active with yardwork.    Walks a lot.    Took a z pack for a sinus infection    Review of Systems   Cardiovascular:  Negative for chest pain, claudication, dyspnea on exertion, irregular heartbeat, leg swelling, near-syncope, orthopnea, palpitations and syncope.      Past Medical History:   Diagnosis Date    ANNA (acute kidney injury) 2/22/2018    Anemia     Anxiety     BPH (benign prostatic hyperplasia)     Coarse tremors     Coronary artery disease of native artery of native heart with stable angina pectoris 5/21/2018    Diverticulosis     Encounter for blood transfusion     Endocarditis     Mechanical heart valve present     Non Hodgkin's lymphoma 8/2/2018    Urinary retention         Past Surgical History:   Procedure Laterality Date    ANTERIOR CERVICAL DISCECTOMY N/A 10/16/2019    Procedure: DISCECTOMY, SPINE, CERVICAL, ANTERIOR C6/7 ACDF;  Surgeon: Jay Reeys MD;  Location: [facility] OR [address] [place] [address];  Service: Neurosurgery;  Laterality: N/A;    APPENDECTOMY      COLONOSCOPY      COLONOSCOPY N/A 2/1/2018    Procedure: COLONOSCOPY;  Surgeon: Richar Payan MD;  Location: [facility]);  Service: Endoscopy;  Laterality: N/A;  PM prep    CORONARY ARTERY BYPASS GRAFT (CABG) N/A 5/25/2018    Procedure: AORTOCORONARY BYPASS-CABG;  Surgeon: Marvin Go MD;  Location: [address];  Service: Cardiovascular;  Laterality: N/A;    EYE SURGERY Right     cataract extraction    FINGER SURGERY      FRACTURE SURGERY Right     index finger    MITRAL VALVE REPLACEMENT N/A 5/25/2018    Procedure: Mitral Valve Replacement;  Surgeon: Marvin Go MD;  Location: [address];  Service: Cardiovascular;  Laterality: N/A;    TONSILLECTOMY         Family History   Problem Relation Age of Onset    Hypertension Mother     Stroke Mother     Heart disease Mother         CABG    Coronary artery disease Mother     Coronary  artery disease Father     Heart disease Father         CABG    No Known Problems Sister     No Known Problems Brother     Melanoma Neg Hx        Social History     Socioeconomic History    Marital status:    Occupational History     Employer: [employer]   Tobacco Use    Smoking status: Never    Smokeless tobacco: Never   Substance and Sexual Activity    Alcohol use: Yes     Comment: social    Drug use: No    Sexual activity: Not Currently     Partners: Female     Social Determinants of Health     Financial Resource Strain: Low Risk     Difficulty of Paying Living Expenses: Not hard at all   Food Insecurity: No Food Insecurity    Worried About Running Out of Food in the Last Year: Never true    Ran Out of Food in the Last Year: Never true   Transportation Needs: No Transportation Needs    Lack of Transportation (Medical): No    Lack of Transportation (Non-Medical): No   Physical Activity: Sufficiently Active    Days of Exercise per Week: 7 days    Minutes of Exercise per Session: 60 min   Stress: No Stress Concern Present    Feeling of Stress : Not at all   Social Connections: Socially Integrated    Frequency of Communication with Friends and Family: More than three times a week    Frequency of Social Gatherings with Friends and Family: More than three times a week    Attends [employer] Services: More than 4 times per year    Active Member of Clubs or Organizations: Yes    Attends Club or Organization Meetings: More than 4 times per year    Marital Status:    Housing Stability: Low Risk     Unable to Pay for Housing in the Last Year: No    Number of Places Lived in the Last Year: 1    Unstable Housing in the Last Year: No       Current Outpatient Medications on File Prior to Visit   Medication Sig Dispense Refill    acetaminophen (TYLENOL) 650 MG TbSR Take 650 mg by mouth every 8 (eight) hours.      amLODIPine (NORVASC) 5 MG tablet Take 1 tablet (5 mg total) by mouth once daily. 90 tablet 3    ascorbic  acid, vitamin C, (VITAMIN C) 1000 MG tablet Take 1,000 mg by mouth every other day.      aspirin (ECOTRIN) 81 MG EC tablet Take 81 mg by mouth once daily.      atorvastatin (LIPITOR) 40 MG tablet GIVE 1 TABLET VIA G TUBE DAILY 90 tablet 3    cetirizine (ZYRTEC) 10 MG tablet Take 10 mg by mouth once daily.      fluticasone propionate (FLONASE) 50 mcg/actuation nasal spray 1 spray by Each Nostril route once daily.      gentamicin (GARAMYCIN) 0.3 % ophthalmic solution       lactose-reduced food (ENSURE HIGH PROTEIN-MUSCLE ORAL) Take by mouth once daily.       magnesium 250 mg Tab Take by mouth once daily.      melatonin 10 mg Tab Take by mouth as needed.       mirtazapine (REMERON) 30 MG tablet Take 1 tablet (30 mg total) by mouth every evening. 90 tablet 3    multivit-min/FA/lycopen/lutein (ADULTS 50 PLUS ORAL) Take by mouth once daily.       pantoprazole (PROTONIX) 40 MG tablet TAKE 1 TABLET EVERY DAY 90 tablet 3    SAW PALMETTO ORAL Take 450 mg by mouth once daily.      tamsulosin (FLOMAX) 0.4 mg Cap Take 2 capsules (0.8 mg total) by mouth once daily. (Patient taking differently: Take 0.4 mg by mouth once daily.) 180 capsule 0    [DISCONTINUED] azithromycin (ZITHROMAX) 500 MG tablet Take 1 tablet (500 mg total) by mouth once daily. Take one tablet an hour before dental work. (Patient not taking: Reported on 1/10/2023) 5 tablet 3     No current facility-administered medications on file prior to visit.       Review of patient's allergies indicates:   Allergen Reactions    Metoprolol Diarrhea    Shellfish containing products     Augmentin [amoxicillin-pot clavulanate]      Patient felt that it raised BP and requested to have it added as intolerance. Tolerated unasyn and ampicillin.    Ciprofloxacin     Flagyl [metronidazole]     Lisinopril Other (See Comments)     cough    Mysoline [primidone]     Omnicef [cefdinir]      Objective:     Vitals:    01/10/23 1350 01/10/23 1354   BP: (!) 148/76 126/64   BP Location: Right  "arm Left arm   Patient Position: Sitting Sitting   BP Method: Large (Automatic) Large (Automatic)   Pulse: 73    SpO2: 95%    Weight: 75.8 kg (167 lb)    Height: 5' 10" (1.778 m)         Physical Exam  Constitutional:       General: He is not in acute distress.     Appearance: He is well-developed. He is not diaphoretic.   Eyes:      General: No scleral icterus.  Neck:      Vascular: No carotid bruit or JVD.   Cardiovascular:      Rate and Rhythm: Regular rhythm.      Heart sounds: Murmur (II/VI systolic) heard.     No friction rub. No gallop.   Pulmonary:      Effort: Pulmonary effort is normal. No respiratory distress.      Breath sounds: Normal breath sounds.   Musculoskeletal:      Right lower leg: No edema.      Left lower leg: No edema.   Skin:     General: Skin is warm and dry.   Neurological:      Mental Status: He is alert and oriented to person, place, and time.   Psychiatric:         Behavior: Behavior normal.         Thought Content: Thought content normal.         Judgment: Judgment normal.       ECG today reviewed by me: NSR with PVC's, first degree AV block, left axis deviation, unchanged    Lab Visit on 08/18/2022   Component Date Value Ref Range Status    WBC 08/18/2022 4.07  3.90 - 12.70 K/uL Final    RBC 08/18/2022 3.34 (L)  4.60 - 6.20 M/uL Final    Hemoglobin 08/18/2022 11.4 (L)  14.0 - 18.0 g/dL Final    Hematocrit 08/18/2022 33.9 (L)  40.0 - 54.0 % Final    MCV 08/18/2022 102 (H)  82 - 98 fL Final    MCH 08/18/2022 34.1 (H)  27.0 - 31.0 pg Final    MCHC 08/18/2022 33.6  32.0 - 36.0 g/dL Final    RDW 08/18/2022 13.0  11.5 - 14.5 % Final    Platelets 08/18/2022 111 (L)  150 - 450 K/uL Final    MPV 08/18/2022 10.6  9.2 - 12.9 fL Final    Immature Granulocytes 08/18/2022 0.2  0.0 - 0.5 % Final    Gran # (ANC) 08/18/2022 2.7  1.8 - 7.7 K/uL Final    Immature Grans (Abs) 08/18/2022 0.01  0.00 - 0.04 K/uL Final    Lymph # 08/18/2022 0.9 (L)  1.0 - 4.8 K/uL Final    Mono # 08/18/2022 0.4  0.3 - 1.0 " K/uL Final    Eos # 08/18/2022 0.1  0.0 - 0.5 K/uL Final    Baso # 08/18/2022 0.03  0.00 - 0.20 K/uL Final    nRBC 08/18/2022 0  0 /100 WBC Final    Gran % 08/18/2022 65.4  38.0 - 73.0 % Final    Lymph % 08/18/2022 21.4  18.0 - 48.0 % Final    Mono % 08/18/2022 9.6  4.0 - 15.0 % Final    Eosinophil % 08/18/2022 2.7  0.0 - 8.0 % Final    Basophil % 08/18/2022 0.7  0.0 - 1.9 % Final    Differential Method 08/18/2022 Automated   Final    Sodium 08/18/2022 145  136 - 145 mmol/L Final    Potassium 08/18/2022 4.7  3.5 - 5.1 mmol/L Final    Chloride 08/18/2022 114 (H)  95 - 110 mmol/L Final    CO2 08/18/2022 25  23 - 29 mmol/L Final    Glucose 08/18/2022 112 (H)  70 - 110 mg/dL Final    BUN 08/18/2022 21  8 - 23 mg/dL Final    Creatinine 08/18/2022 1.3  0.5 - 1.4 mg/dL Final    Calcium 08/18/2022 9.6  8.7 - 10.5 mg/dL Final    Total Protein 08/18/2022 6.5  6.0 - 8.4 g/dL Final    Albumin 08/18/2022 3.9  3.5 - 5.2 g/dL Final    Total Bilirubin 08/18/2022 1.2 (H)  0.1 - 1.0 mg/dL Final    Alkaline Phosphatase 08/18/2022 56  55 - 135 U/L Final    AST 08/18/2022 14  10 - 40 U/L Final    ALT 08/18/2022 11  10 - 44 U/L Final    Anion Gap 08/18/2022 6 (L)  8 - 16 mmol/L Final    eGFR 08/18/2022 58.4 (A)  >60 mL/min/1.73 m^2 Final    LD 08/18/2022 182  110 - 260 U/L Final   (   Authorization Time:  1:18 PM              DATE OF PROCEDURE:  05/25/2018     PREOPERATIVE DIAGNOSES:  1.  Acute mitral endocarditis.  2.  Severe mitral regurgitation.  3.  Mild to moderate aortic insufficiency.  4.  Coronary artery disease.  5.  Low ejection fraction.  6.  Heart failure, acute on chronic, systolic and diastolic heart failure.  7.  Status post intraaortic balloon pump.     POSTOPERATIVE DIAGNOSES:  1.  Acute mitral endocarditis.  2.  Severe mitral regurgitation.  3.  Mild to moderate aortic insufficiency.  4.  Coronary artery disease.  5.  Low ejection fraction.  6.  Heart failure, acute on chronic, systolic and diastolic heart failure.  7.   Status post intraaortic balloon pump.     OPERATIONS:  1.  Mitral valve replacement.  2.  CABG x1 (LIMA-LAD).  3.  Takedown of the left internal mammary artery, skeletonized technique.     STAFF SURGEON:  Marvin Go M.D.     FIRST ASSISTANT:  Christoph Saldaña M.D. (RES)     SECOND ASSISTANT:  Lauren Giang.     ANESTHESIA:  GETA.     ESTIMATED BLOOD LOSS:  200 mL.     KEY FINDINGS OF THE OPERATION:  1.  Extreme destruction of the mitral valve.  Both anterior and posterior   leaflets with large vegetations, requiring almost near complete excision of the   anterior and the posterior leaflets with maintenance of subvalvular apparatus.  2.  Good quality of left internal mammary artery with excellent flow.  3.  Good hemostasis.  4.  Aortic insufficiency was mild as well as the mitral insufficiency and hence   no intervention was done.     INDICATION OF OPERATION:  This is a 67-year-old debilitated patient who was   admitted with acute endocarditis in the hospital.  The patient was wheelchair   bound.  The patient over the last ____ completed his antibiotic course and was   improved significantly and his nutritional status and was maintained on   guideline directed heart failure medications and now a decision was made to   proceed for replacement or repair of his mitral valve as well as other valves.    Risks, benefits, and alternatives were discussed.  The patient was brought   electively to the hospital and placed with an intraaortic balloon pump to help   in unloading of the heart, especially with the severe mitral regurgitation,   fluid overload, to ensure diuresis, and improvement of end organ function.  Once   that was done, an informed consent was obtained for the operation.     DESCRIPTION OF OPERATION:  The patient was brought to the Operating Room and   placed in a supine position.  After induction of anesthesia, area was prepped   and draped in the usual sterile fashion.  Previously placed midline incision  was   made, which was carried all the way down to the sternum.  A median sternotomy   was then performed.  A chest retractor was then placed and the pericardium was   then opened up.  A Rultract retractor was placed on the left side and the left   internal mammary artery was then taken down in a skeletonized fashion from the   origin all the way down to the bifurcation.  Systemic heparinization was carried   out.  Once ACT greater than 450 was obtained, the distal portion of the left   internal mammary artery was clipped and ligated.  It was prepared with   papaverine to remove any vasospasm.  Cannulation stitches were placed.  Arterial   cannula was in the ascending aorta.  Venous cannula was placed in the SVC and   the IVC to obtain a bicaval cannulation strategy.  Circumferential dissection of   the SVC and the IVC was done and placement of caval tapes was down.  Once that   was done, the patient was placed on full cardiopulmonary bypass.  Antegrade   cardioplegia catheter was placed in the ascending aorta.  Cross clamp was   applied and a cardiac standstill was obtained.  Caval tapes were cinched down.    The right atrium was opened up.  Direct visualization of the coronary sinus was   done and cannulation of the cardioplegia catheter was done to deliver   cardioplegia.  Once that was achieved, the septum was opened.  The interatrial   septum was incised and the mitral retractor was placed for exposure.  The   incision was extended into the dome of the left atrium.  Once that was achieved,   visualization of the anterior and the posterior leaflet of the mitral valve was   carried out.  Extensive destruction and vegetation both of the leaflets was   present.  The anterior leaflet was completely excised.  However, we maintained   the subvalvular apparatus of both the anterior and the posterior leaflet.  In   the posterior leaflets, P1 and P3 were retained, but P2 had to be completely   excised because of large  vegetation that was present.  Multiple pledgeted   everting stitches were placed around the annulus and tacking the subvalvular   structures with the sutures.  After sizing physiologically, a 27 bioprosthetic   valve was found to be a good fit.  A 27 Medtronic valve was brought to the field   and prepared.  The needles were passed through the sewing ring and cut and   passed off the field.  The sutures were tied down and cut.  Once that was done,   every 20 minutes, we were instilling cardioplegia for cardiac protection.  The   interatrial septum was closed using 4-0 Prolene pledgeted stitch in a continuous   running fashion.  The right atrium was then closed in a similar manner with a   4-0 Prolene stitch.  Attention was then directed towards the mid portion of the   LAD.  The LAD was dissected out and opened up and in the meantime, the left   internal mammary artery was spatulated and prepared for anastomosis using a 7-0   Prolene stitch.  A continuous running anastomosis was performed.  Once that was   done, a dose of hot shot was given over 3 minutes, followed by removal of cross   clamp.  The root vent was used for de-airing.  Instant cardiac activity was   noted.  No intracardiac air was noted.  Ventilation was resumed.  Electrolytes   were found to be within normal limits.  Gradually, the patient was weaned off   from cardiopulmonary bypass.  The patient  from cardiopulmonary bypass   without any issues.  Test dose of protamine was given followed by full dose of   protamine to reverse the effects of systemic heparin.  Midway dose, all the   various catheters and cannulas were removed.  No intracardiac air was noted.  No   paravalvular leak was noted.  No mitral regurgitation was noted.  No MS was   noted.  Aortic insufficiency was still mild as well as mild tricuspid   regurgitation.  No wall motion abnormality was noted.  At this stage, multiple   pacing wires were placed on the ventricular surfaces  and brought through   separate skin incision.  Four drainage tubes were placed, two in the mediastinum   and one in each pleural space, and connected to Pleurovac for drainage   purposes.  Sternum was approximated with #6 stainless steel wires.  Skin and   subcutaneous tissues were closed in multiple layers.  A sterile dressing was   applied.  Terminal count of needles, sponges, and instrument was found to be   correct.     MEDICARE ATTESTATION:  I was present for all parts of the operation and Dr. Christoph Saldaña acted as my first assistant.        AB/GLORIA  dd: 05/25/2018 16:11:59 (CDT)  td: 05/25/2018 18:01:38 (CDT)  Doc ID   #[id number]  Job ID #[id number]         Accession #: [id number]    Transthoracic echo (TTE) complete  Order# [id number]  Reading physician: Yousuf Lockett MD Ordering physician: Yousuf Lockett MD Study date: 7/30/21       Reason for Exam  Priority: Routine  Dx: S/P MVR (mitral valve replacement) [Z95.2 (ICD-10-CM)]     Result Image Hyperlink     Show images for Echo Color Flow Doppler? Yes    Summary    The left ventricle is normal in size with mild concentric hypertrophy and low normal systolic function.  The estimated ejection fraction is 50%.  Grade II left ventricular diastolic dysfunction.  Moderate right ventricular enlargement with mildly reduced right ventricular systolic function.  Severe left atrial enlargement.  There is a bioprosthetic mitral valve. There is no insufficiency present.  There is abnormal septal wall motion consistent with post-operative status.  Mild-to-moderate aortic regurgitation.  Mild to moderate tricuspid regurgitation.  Mild to moderate pulmonic regurgitation.  There is mild mitral stenosis. The estimated maximum instantaneous gradient across the mitral valve during diastole with a heart rate of 66 bpm is10 mm Hg and the mean gradient is 5.5 mm Hg  Intermediate central venous pressure (8 mmHg).  The estimated PA systolic pressure is 42 mmHg.  There is pulmonary  hypertension.         Vitals    Height   Accession #: [id number]    Rodolfo Messina  Holter monitor - 24 hour  Order# [id number]  Reading physician: Tony Gomez MD Ordering physician: Yousuf Lockett MD Study date: 11/4/19       Reason for Exam  Priority: Routine  Dx: S/P MVR (mitral valve replacement) [Z95.2 (ICD-10-CM)]; S/P CABG x 1 [Z95.1 (ICD-10-CM)]; Asymptomatic PVCs [I49.3 (ICD-10-CM)]; Premature atrial complexes [I49.1 (ICD-10-CM)]     Conclusion    Predominant Rhythm Sinus rhythm with heart rates varying between 56 and 111 bpm with an average of 78 bpm.  There were very frequent PVCs totalling 7589 and averaging 316.21 per hour. There were 11 couplets. There were 55 bigeminal cycles. There were 1 triplets.  There were very rare PACs totalling 26 and averaging 1.08 per hour.  The diary was properly completed. There were rare hookup related artifacts. Overall, the study was of good quality. The tape was adequate (0 days , 24 hours, 0 minutes).  There was an episode of 2PM EXERCISE reported. The corresponding rhythm strips revealed the following: the rhythm was sinus rhythm at 92 bpm with PVCs.         Performing Clinician    RT Immanuel       Assessment:     1. S/P MVR (mitral valve replacement)    2. S/P CABG x 1    3. Primary hypertension    4. H/O atrial flutter    5. PVC's (premature ventricular contractions)    6. Essential tremor    7. H/O bacterial endocarditis      Plan:   Rodolfo was seen today for follow-up.    Diagnoses and all orders for this visit:    S/P MVR (mitral valve replacement)  -     IN OFFICE EKG 12-LEAD (to [place])  -     Echo Saline Bubble? No; Future    S/P CABG x 1  -     IN OFFICE EKG 12-LEAD (to [place])  -     Echo Saline Bubble? No; Future    Primary hypertension  -     IN OFFICE EKG 12-LEAD (to [place])  -     Echo Saline Bubble? No; Future    H/O atrial flutter  -     IN OFFICE EKG 12-LEAD (to [place])  -     Echo Saline Bubble? No; Future    PVC's (premature ventricular  "contractions)  -     IN OFFICE EKG 12-LEAD (to [place])  -     Echo Saline Bubble? No; Future    Essential tremor  -     Echo Saline Bubble? No; Future    H/O bacterial endocarditis  -     Echo Saline Bubble? No; Future     Pt is clinically stable from cardiac standpoint.    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 Echocardiogram prior to next visit in [place]    "

## 2023-02-02 ENCOUNTER — TELEPHONE (OUTPATIENT)
Dept: FAMILY MEDICINE | Facility: CLINIC | Age: 73
End: 2023-02-02
Payer: MEDICARE

## 2023-02-02 RX ORDER — ALPRAZOLAM 0.25 MG/1
0.25 TABLET ORAL 3 TIMES DAILY PRN
Qty: 30 TABLET | Refills: 1 | Status: SHIPPED | OUTPATIENT
Start: 2023-02-02 | End: 2024-03-20

## 2023-02-02 NOTE — TELEPHONE ENCOUNTER
Patient called  Grandson passed away    Can you prescribe was xanax  [facility]    Troubling sleeping

## 2023-05-12 ENCOUNTER — PES CALL (OUTPATIENT)
Dept: ADMINISTRATIVE | Facility: CLINIC | Age: 73
End: 2023-05-12
Payer: MEDICARE

## 2023-05-18 ENCOUNTER — LAB VISIT (OUTPATIENT)
Dept: LAB | Facility: HOSPITAL | Age: 73
End: 2023-05-18
Attending: FAMILY MEDICINE
Payer: MEDICARE

## 2023-05-18 DIAGNOSIS — Z95.1 S/P CABG X 1: ICD-10-CM

## 2023-05-18 DIAGNOSIS — Z12.5 SCREENING FOR PROSTATE CANCER: ICD-10-CM

## 2023-05-18 DIAGNOSIS — R73.9 HYPERGLYCEMIA: ICD-10-CM

## 2023-05-18 DIAGNOSIS — I10 HYPERTENSION, UNSPECIFIED TYPE: ICD-10-CM

## 2023-05-18 LAB
ALBUMIN SERPL BCP-MCNC: 4 G/DL (ref 3.5–5.2)
ALP SERPL-CCNC: 53 U/L (ref 38–126)
ALT SERPL W/O P-5'-P-CCNC: 22 U/L (ref 10–44)
ANION GAP SERPL CALC-SCNC: 7 MMOL/L (ref 8–16)
AST SERPL-CCNC: 24 U/L (ref 15–46)
BASOPHILS # BLD AUTO: 0.02 K/UL (ref 0–0.2)
BASOPHILS NFR BLD: 0.5 % (ref 0–1.9)
BILIRUB SERPL-MCNC: 1.1 MG/DL (ref 0.1–1)
CALCIUM SERPL-MCNC: 9.2 MG/DL (ref 8.7–10.5)
CHLORIDE SERPL-SCNC: 112 MMOL/L (ref 95–110)
CHOLEST SERPL-MCNC: 114 MG/DL (ref 120–199)
CHOLEST/HDLC SERPL: 2.4 {RATIO} (ref 2–5)
CO2 SERPL-SCNC: 25 MMOL/L (ref 23–29)
COMPLEXED PSA SERPL-MCNC: 1.6 NG/ML (ref 0–4)
CREAT SERPL-MCNC: 1.21 MG/DL (ref 0.5–1.4)
DIFFERENTIAL METHOD: ABNORMAL
EOSINOPHIL # BLD AUTO: 0.2 K/UL (ref 0–0.5)
EOSINOPHIL NFR BLD: 4.3 % (ref 0–8)
ERYTHROCYTE [DISTWIDTH] IN BLOOD BY AUTOMATED COUNT: 12.3 % (ref 11.5–14.5)
EST. GFR  (NO RACE VARIABLE): >60 ML/MIN/1.73 M^2
ESTIMATED AVG GLUCOSE: 105 MG/DL (ref 68–131)
GLUCOSE SERPL-MCNC: 92 MG/DL (ref 70–110)
HBA1C MFR BLD: 5.3 % (ref 4–5.6)
HCT VFR BLD AUTO: 34.3 % (ref 40–54)
HDLC SERPL-MCNC: 47 MG/DL (ref 40–75)
HDLC SERPL: 41.2 % (ref 20–50)
HGB BLD-MCNC: 11.5 G/DL (ref 14–18)
IMM GRANULOCYTES # BLD AUTO: 0 K/UL (ref 0–0.04)
IMM GRANULOCYTES NFR BLD AUTO: 0 % (ref 0–0.5)
LDLC SERPL CALC-MCNC: 53.4 MG/DL (ref 63–159)
LYMPHOCYTES # BLD AUTO: 0.9 K/UL (ref 1–4.8)
LYMPHOCYTES NFR BLD: 25.1 % (ref 18–48)
MCH RBC QN AUTO: 33 PG (ref 27–31)
MCHC RBC AUTO-ENTMCNC: 33.5 G/DL (ref 32–36)
MCV RBC AUTO: 99 FL (ref 82–98)
MONOCYTES # BLD AUTO: 0.4 K/UL (ref 0.3–1)
MONOCYTES NFR BLD: 9.7 % (ref 4–15)
NEUTROPHILS # BLD AUTO: 2.2 K/UL (ref 1.8–7.7)
NEUTROPHILS NFR BLD: 60.4 % (ref 38–73)
NONHDLC SERPL-MCNC: 67 MG/DL
NRBC BLD-RTO: 0 /100 WBC
PLATELET # BLD AUTO: 109 K/UL (ref 150–450)
PMV BLD AUTO: 10.9 FL (ref 9.2–12.9)
POTASSIUM SERPL-SCNC: 4 MMOL/L (ref 3.5–5.1)
PROT SERPL-MCNC: 6.5 G/DL (ref 6–8.4)
RBC # BLD AUTO: 3.48 M/UL (ref 4.6–6.2)
SODIUM SERPL-SCNC: 144 MMOL/L (ref 136–145)
TRIGL SERPL-MCNC: 68 MG/DL (ref 30–150)
TSH SERPL DL<=0.005 MIU/L-ACNC: 2.64 UIU/ML (ref 0.4–4)
UUN UR-MCNC: 21 MG/DL (ref 2–20)
WBC # BLD AUTO: 3.71 K/UL (ref 3.9–12.7)

## 2023-05-18 PROCEDURE — 80053 COMPREHEN METABOLIC PANEL: CPT | Mod: PO | Performed by: FAMILY MEDICINE

## 2023-05-18 PROCEDURE — 84153 ASSAY OF PSA TOTAL: CPT | Performed by: FAMILY MEDICINE

## 2023-05-18 PROCEDURE — 84443 ASSAY THYROID STIM HORMONE: CPT | Mod: PO | Performed by: FAMILY MEDICINE

## 2023-05-18 PROCEDURE — 83036 HEMOGLOBIN GLYCOSYLATED A1C: CPT | Performed by: FAMILY MEDICINE

## 2023-05-18 PROCEDURE — 80061 LIPID PANEL: CPT | Performed by: FAMILY MEDICINE

## 2023-05-18 PROCEDURE — 36415 COLL VENOUS BLD VENIPUNCTURE: CPT | Mod: PO | Performed by: FAMILY MEDICINE

## 2023-05-18 PROCEDURE — 85025 COMPLETE CBC W/AUTO DIFF WBC: CPT | Mod: PO | Performed by: FAMILY MEDICINE

## 2023-05-22 ENCOUNTER — OFFICE VISIT (OUTPATIENT)
Dept: FAMILY MEDICINE | Facility: CLINIC | Age: 73
End: 2023-05-22
Payer: MEDICARE

## 2023-05-22 VITALS
SYSTOLIC BLOOD PRESSURE: 120 MMHG | HEIGHT: 70 IN | WEIGHT: 170.75 LBS | HEART RATE: 78 BPM | OXYGEN SATURATION: 96 % | DIASTOLIC BLOOD PRESSURE: 82 MMHG | BODY MASS INDEX: 24.44 KG/M2 | TEMPERATURE: 98 F

## 2023-05-22 DIAGNOSIS — Z00.00 ROUTINE HEALTH MAINTENANCE: Primary | ICD-10-CM

## 2023-05-22 DIAGNOSIS — Z86.79 H/O BACTERIAL ENDOCARDITIS: ICD-10-CM

## 2023-05-22 DIAGNOSIS — Z95.1 S/P CABG X 1: ICD-10-CM

## 2023-05-22 DIAGNOSIS — N18.31 STAGE 3A CHRONIC KIDNEY DISEASE: ICD-10-CM

## 2023-05-22 DIAGNOSIS — Z12.5 SCREENING PSA (PROSTATE SPECIFIC ANTIGEN): ICD-10-CM

## 2023-05-22 DIAGNOSIS — D69.6 THROMBOCYTOPENIA, UNSPECIFIED: ICD-10-CM

## 2023-05-22 DIAGNOSIS — I10 HYPERTENSION, UNSPECIFIED TYPE: ICD-10-CM

## 2023-05-22 DIAGNOSIS — R73.9 HYPERGLYCEMIA: ICD-10-CM

## 2023-05-22 DIAGNOSIS — Z86.79 H/O ATRIAL FLUTTER: ICD-10-CM

## 2023-05-22 PROBLEM — I50.22 CHRONIC SYSTOLIC CONGESTIVE HEART FAILURE: Status: RESOLVED | Noted: 2018-05-23 | Resolved: 2023-05-22

## 2023-05-22 PROBLEM — C85.90 NON HODGKIN'S LYMPHOMA: Status: RESOLVED | Noted: 2018-08-02 | Resolved: 2023-05-22

## 2023-05-22 PROBLEM — Z74.09 IMPAIRED FUNCTIONAL MOBILITY AND ENDURANCE: Status: RESOLVED | Noted: 2018-06-08 | Resolved: 2023-05-22

## 2023-05-22 PROCEDURE — 3074F SYST BP LT 130 MM HG: CPT | Mod: CPTII,S$GLB,, | Performed by: FAMILY MEDICINE

## 2023-05-22 PROCEDURE — 3288F PR FALLS RISK ASSESSMENT DOCUMENTED: ICD-10-PCS | Mod: CPTII,S$GLB,, | Performed by: FAMILY MEDICINE

## 2023-05-22 PROCEDURE — 3079F DIAST BP 80-89 MM HG: CPT | Mod: CPTII,S$GLB,, | Performed by: FAMILY MEDICINE

## 2023-05-22 PROCEDURE — 1159F MED LIST DOCD IN RCRD: CPT | Mod: CPTII,S$GLB,, | Performed by: FAMILY MEDICINE

## 2023-05-22 PROCEDURE — 3008F BODY MASS INDEX DOCD: CPT | Mod: CPTII,S$GLB,, | Performed by: FAMILY MEDICINE

## 2023-05-22 PROCEDURE — 3074F PR MOST RECENT SYSTOLIC BLOOD PRESSURE < 130 MM HG: ICD-10-PCS | Mod: CPTII,S$GLB,, | Performed by: FAMILY MEDICINE

## 2023-05-22 PROCEDURE — 1126F PR PAIN SEVERITY QUANTIFIED, NO PAIN PRESENT: ICD-10-PCS | Mod: CPTII,S$GLB,, | Performed by: FAMILY MEDICINE

## 2023-05-22 PROCEDURE — 99397 PER PM REEVAL EST PAT 65+ YR: CPT | Mod: S$GLB,,, | Performed by: FAMILY MEDICINE

## 2023-05-22 PROCEDURE — 1101F PR PT FALLS ASSESS DOC 0-1 FALLS W/OUT INJ PAST YR: ICD-10-PCS | Mod: CPTII,S$GLB,, | Performed by: FAMILY MEDICINE

## 2023-05-22 PROCEDURE — 1160F PR REVIEW ALL MEDS BY PRESCRIBER/CLIN PHARMACIST DOCUMENTED: ICD-10-PCS | Mod: CPTII,S$GLB,, | Performed by: FAMILY MEDICINE

## 2023-05-22 PROCEDURE — 3008F PR BODY MASS INDEX (BMI) DOCUMENTED: ICD-10-PCS | Mod: CPTII,S$GLB,, | Performed by: FAMILY MEDICINE

## 2023-05-22 PROCEDURE — 1159F PR MEDICATION LIST DOCUMENTED IN MEDICAL RECORD: ICD-10-PCS | Mod: CPTII,S$GLB,, | Performed by: FAMILY MEDICINE

## 2023-05-22 PROCEDURE — 1126F AMNT PAIN NOTED NONE PRSNT: CPT | Mod: CPTII,S$GLB,, | Performed by: FAMILY MEDICINE

## 2023-05-22 PROCEDURE — 99397 PR PREVENTIVE VISIT,EST,65 & OVER: ICD-10-PCS | Mod: S$GLB,,, | Performed by: FAMILY MEDICINE

## 2023-05-22 PROCEDURE — 1160F RVW MEDS BY RX/DR IN RCRD: CPT | Mod: CPTII,S$GLB,, | Performed by: FAMILY MEDICINE

## 2023-05-22 PROCEDURE — 1101F PT FALLS ASSESS-DOCD LE1/YR: CPT | Mod: CPTII,S$GLB,, | Performed by: FAMILY MEDICINE

## 2023-05-22 PROCEDURE — 3044F HG A1C LEVEL LT 7.0%: CPT | Mod: CPTII,S$GLB,, | Performed by: FAMILY MEDICINE

## 2023-05-22 PROCEDURE — 3044F PR MOST RECENT HEMOGLOBIN A1C LEVEL <7.0%: ICD-10-PCS | Mod: CPTII,S$GLB,, | Performed by: FAMILY MEDICINE

## 2023-05-22 PROCEDURE — 3079F PR MOST RECENT DIASTOLIC BLOOD PRESSURE 80-89 MM HG: ICD-10-PCS | Mod: CPTII,S$GLB,, | Performed by: FAMILY MEDICINE

## 2023-05-22 PROCEDURE — 3288F FALL RISK ASSESSMENT DOCD: CPT | Mod: CPTII,S$GLB,, | Performed by: FAMILY MEDICINE

## 2023-05-22 NOTE — PROGRESS NOTES
Patient ID: Rodolfo Messina is a 72 y.o. male.    Chief Complaint: Annual Exam    HPI     Rodolfo Messina is a 72 y.o. male. here for annual exam.  Physically doing well.  Emotionally having a difficult time because of the recent death the grandson who which they were intimately involved in raising him.    No complaints chest pain or palpitations.  No nausea vomiting diarrhea.  Weight is stable-using ensure one can daily.    Review of Symptoms    Constitutional: Negative.    HENT: Negative.    Eyes: Negative.    Respiratory: Negative.    Cardiovascular: Negative.    Gastrointestinal: Negative.    Endocrine: Negative.    Genitourinary: Negative.    Musculoskeletal: Negative.    Skin: Negative.    Allergic/Immunologic: Negative.    Neurological: Negative.    Hematological: Negative.    Psychiatric/Behavioral: Negative.      Except as above in HPI    Current Outpatient Medications on File Prior to Visit   Medication Sig Dispense Refill    acetaminophen (TYLENOL) 650 MG TbSR Take 650 mg by mouth every 8 (eight) hours.      amLODIPine (NORVASC) 5 MG tablet Take 1 tablet (5 mg total) by mouth once daily. 90 tablet 3    ascorbic acid, vitamin C, (VITAMIN C) 1000 MG tablet Take 1,000 mg by mouth every other day.      aspirin (ECOTRIN) 81 MG EC tablet Take 81 mg by mouth once daily.      atorvastatin (LIPITOR) 40 MG tablet GIVE 1 TABLET VIA G TUBE DAILY 90 tablet 3    cetirizine (ZYRTEC) 10 MG tablet Take 10 mg by mouth once daily.      fluticasone propionate (FLONASE) 50 mcg/actuation nasal spray 1 spray by Each Nostril route once daily.      gentamicin (GARAMYCIN) 0.3 % ophthalmic solution       lactose-reduced food (ENSURE HIGH PROTEIN-MUSCLE ORAL) Take by mouth once daily.       magnesium 250 mg Tab Take by mouth once daily.      melatonin 10 mg Tab Take by mouth as needed.       mirtazapine (REMERON) 30 MG tablet Take 1 tablet (30 mg total) by mouth every evening. 90 tablet 3    multivit-min/FA/lycopen/lutein  "(ADULTS 50 PLUS ORAL) Take by mouth once daily.       pantoprazole (PROTONIX) 40 MG tablet TAKE 1 TABLET EVERY DAY 90 tablet 3    SAW PALMETTO ORAL Take 450 mg by mouth once daily.      tamsulosin (FLOMAX) 0.4 mg Cap Take 2 capsules (0.8 mg total) by mouth once daily. (Patient taking differently: Take 0.4 mg by mouth once daily.) 180 capsule 0    ALPRAZolam (XANAX) 0.25 MG tablet Take 1 tablet (0.25 mg total) by mouth 3 (three) times daily as needed for Anxiety. (Patient not taking: Reported on 5/22/2023) 30 tablet 1     No current facility-administered medications on file prior to visit.         Physical  Exam    Vitals:    05/22/23 1124   BP: 120/82   BP Location: Right arm   Patient Position: Sitting   Pulse: 78   Temp: 97.7 °F (36.5 °C)   TempSrc: Oral   SpO2: 96%   Weight: 77.4 kg (170 lb 11.9 oz)   Height: 5' 10" (1.778 m)          Constitutional:  Oriented to person, place, and time. Appears well-developed and well-nourished.     HENT:   Head: Normocephalic and atraumatic.     Right Ear: External ear normal     Left Ear: External ear normal      Nose: Nose normal. No rhinorrhea or nasal deformity.     Mouth/Throat: Moist mucus membranes      Eyes: Conjunctivae are normal. Right eye exhibits no discharge. Left eye exhibits no  discharge. No scleral icterus.     Neck:  No JVD present. No tracheal deviation  [x]  Neck supple.   Carotid Arteries  [x]  No Bruit    Cardiovascular:  Regular rate and rhythm with normal S1 and S2     Pulmonary/Chest:   Clear to auscultation bilaterally without wheezes, rhonchi or rales    Abdominal: Soft. No distension and no mass.  No tenderness. No rebound and No guarding.     Musculoskeletal: Normal range of motion. No edema or tenderness.   No deformity     Lymphadenopathy:   []  No cervical adenopathy.  []  No inguinal adenopathy    Neurological:  Alert and oriented to person, place, and time. Coordination normal.     Skin: Skin is warm and dry. No rash noted. No bruising   "   Psychiatric: Normal mood and affect. Speech is normal and behavior is normal. Judgment and thought content normal.       Assessment / Plan:      ICD-10-CM ICD-9-CM   1. Routine health maintenance  Z00.00 V70.0   2. Hyperglycemia  R73.9 790.29   3. S/P CABG x 1  Z95.1 V45.81   4. Hypertension, unspecified type  I10 401.9   5. Screening PSA (prostate specific antigen)  Z12.5 V76.44     Routine health maintenance  -     Ambulatory referral/consult to Urology; Future; Expected date: 05/29/2023  -     Comprehensive Metabolic Panel; Future  -     CBC Auto Differential; Future  -     Lipid Panel; Future  -     TSH; Future  -     Hemoglobin A1C; Future; Expected date: 05/22/2023    Hyperglycemia  -     Comprehensive Metabolic Panel; Future  -     CBC Auto Differential; Future  -     Lipid Panel; Future  -     TSH; Future  -     Hemoglobin A1C; Future; Expected date: 05/22/2023    S/P CABG x 1  -     Comprehensive Metabolic Panel; Future  -     CBC Auto Differential; Future  -     Lipid Panel; Future  -     TSH; Future  -     Hemoglobin A1C; Future; Expected date: 05/22/2023    Hypertension, unspecified type  -     Comprehensive Metabolic Panel; Future  -     CBC Auto Differential; Future  -     Lipid Panel; Future  -     TSH; Future  -     Hemoglobin A1C; Future; Expected date: 05/22/2023    Screening PSA (prostate specific antigen)  -     PSA, Screening; Future; Expected date: 05/06/2024    Reviewed lab work with him-all normal-repeat in one year  Continue do things in life that give them naheed   Discussed how to stay healthy             Deric Barrios M.D.

## 2023-05-24 ENCOUNTER — TELEPHONE (OUTPATIENT)
Dept: FAMILY MEDICINE | Facility: CLINIC | Age: 73
End: 2023-05-24
Payer: MEDICARE

## 2023-05-24 NOTE — TELEPHONE ENCOUNTER
----- Message from Akash Rocha sent at 5/24/2023  3:55 PM CDT -----  Pt Requesting Call Back    Who called: Pushpa of Dr. Daly's office  Who called for pt:  Best call back #:   Add notes: Pushpa says she needs pt's last chart notes or any testing he had done

## 2023-05-30 ENCOUNTER — PATIENT OUTREACH (OUTPATIENT)
Dept: ADMINISTRATIVE | Facility: OTHER | Age: 73
End: 2023-05-30
Payer: MEDICARE

## 2023-05-30 NOTE — PROGRESS NOTES
CHW - Initial Contact    This Community Health Worker completed the Social Determinant of Health questionnaire with MRN [id number] over the phone today.    Pt identified barriers of most importance are: No barriers reported   Referrals to community agencies completed with patient/caregiver consent outside of [facility] include: No  Referrals were put through [facility] - No  Support and Services: No support & services have been documented.  Other information discussed the patient needs / wants help with: No assistance requested. No future outreach is required at this time.   Follow up required: No  No future outreach task assigned

## 2023-06-05 RX ORDER — ATORVASTATIN CALCIUM 40 MG/1
TABLET, FILM COATED ORAL
Qty: 90 TABLET | Refills: 3 | Status: SHIPPED | OUTPATIENT
Start: 2023-06-05

## 2023-06-05 NOTE — TELEPHONE ENCOUNTER
No care due was identified.  [facility] Embedded Care Due Messages. Reference number: [id number].   6/05/2023 10:21:31 AM CDT

## 2023-06-05 NOTE — TELEPHONE ENCOUNTER
Refill Decision Note   Rodolfo Messina  is requesting a refill authorization.  Brief Assessment and Rationale for Refill:  Approve     Medication Therapy Plan:       Medication Reconciliation Completed: No   Comments:     No Care Gaps recommended.     Note composed:10:29 AM 06/05/2023

## 2023-06-21 RX ORDER — PANTOPRAZOLE SODIUM 40 MG/1
TABLET, DELAYED RELEASE ORAL
Qty: 90 TABLET | Refills: 3 | Status: SHIPPED | OUTPATIENT
Start: 2023-06-21

## 2023-06-21 NOTE — TELEPHONE ENCOUNTER
Refill Decision Note   Rodolfo Messina  is requesting a refill authorization.  Brief Assessment and Rationale for Refill:  Approve     Medication Therapy Plan:       Medication Reconciliation Completed: No   Comments:     No Care Gaps recommended.     Note composed:4:32 PM 06/21/2023

## 2023-06-21 NOTE — TELEPHONE ENCOUNTER
No care due was identified.  [facility] Embedded Care Due Messages. Reference number: [id number].   6/21/2023 4:25:07 PM CDT

## 2023-06-23 ENCOUNTER — HOSPITAL ENCOUNTER (OUTPATIENT)
Dept: CARDIOLOGY | Facility: HOSPITAL | Age: 73
Discharge: HOME | End: 2023-06-23
Attending: INTERNAL MEDICINE
Payer: MEDICARE

## 2023-06-23 VITALS — WEIGHT: 170 LBS | BODY MASS INDEX: 24.34 KG/M2 | HEIGHT: 70 IN

## 2023-06-23 DIAGNOSIS — G25.0 ESSENTIAL TREMOR: ICD-10-CM

## 2023-06-23 DIAGNOSIS — Z86.79 H/O ATRIAL FLUTTER: ICD-10-CM

## 2023-06-23 DIAGNOSIS — I49.3 PVC'S (PREMATURE VENTRICULAR CONTRACTIONS): ICD-10-CM

## 2023-06-23 DIAGNOSIS — Z95.2 S/P MVR (MITRAL VALVE REPLACEMENT): ICD-10-CM

## 2023-06-23 DIAGNOSIS — I10 PRIMARY HYPERTENSION: ICD-10-CM

## 2023-06-23 DIAGNOSIS — Z86.79 H/O BACTERIAL ENDOCARDITIS: ICD-10-CM

## 2023-06-23 DIAGNOSIS — Z95.1 S/P CABG X 1: ICD-10-CM

## 2023-06-23 PROCEDURE — 93306 TTE W/DOPPLER COMPLETE: CPT | Mod: 26,,, | Performed by: INTERNAL MEDICINE

## 2023-06-23 PROCEDURE — 93306 TTE W/DOPPLER COMPLETE: CPT | Mod: PO

## 2023-06-23 PROCEDURE — 93306 ECHO (CUPID ONLY): ICD-10-PCS | Mod: 26,,, | Performed by: INTERNAL MEDICINE

## 2023-06-26 LAB
AORTIC ROOT ANNULUS: 2.48 CM
AORTIC VALVE CUSP SEPERATION: 2.05 CM
ASCENDING AORTA: 2.89 CM
AV INDEX (PROSTH): 0.77
AV MEAN GRADIENT: 6 MMHG
AV PEAK GRADIENT: 10 MMHG
AV REGURGITATION PRESSURE HALF TIME: 266.67 MS
AV VALVE AREA: 3.21 CM2
AV VELOCITY RATIO: 0.76
BSA FOR ECHO PROCEDURE: 1.95 M2
CV ECHO LV RWT: 0.58 CM
DOP CALC AO PEAK VEL: 1.62 M/S
DOP CALC AO VTI: 38 CM
DOP CALC LVOT AREA: 4.2 CM2
DOP CALC LVOT DIAMETER: 2.31 CM
DOP CALC LVOT PEAK VEL: 1.23 M/S
DOP CALC LVOT STROKE VOLUME: 121.9 CM3
DOP CALC MV VTI: 65.2 CM
DOP CALCLVOT PEAK VEL VTI: 29.1 CM
E WAVE DECELERATION TIME: 221.18 MSEC
E/A RATIO: 0.89
E/E' RATIO: 16.32 M/S
ECHO LV POSTERIOR WALL: 1.37 CM (ref 0.6–1.1)
EJECTION FRACTION: 50 %
FRACTIONAL SHORTENING: 26 % (ref 28–44)
INTERVENTRICULAR SEPTUM: 1.53 CM (ref 0.6–1.1)
LA MAJOR: 5.49 CM
LA MINOR: 5.56 CM
LA WIDTH: 5.2 CM
LEFT ATRIUM SIZE: 5.29 CM
LEFT ATRIUM VOLUME INDEX MOD: 66 ML/M2
LEFT ATRIUM VOLUME INDEX: 66.2 ML/M2
LEFT ATRIUM VOLUME MOD: 128.62 CM3
LEFT ATRIUM VOLUME: 129.18 CM3
LEFT INTERNAL DIMENSION IN SYSTOLE: 3.53 CM (ref 2.1–4)
LEFT VENTRICLE DIASTOLIC VOLUME INDEX: 53.52 ML/M2
LEFT VENTRICLE DIASTOLIC VOLUME: 104.36 ML
LEFT VENTRICLE MASS INDEX: 145 G/M2
LEFT VENTRICLE SYSTOLIC VOLUME INDEX: 26.6 ML/M2
LEFT VENTRICLE SYSTOLIC VOLUME: 51.84 ML
LEFT VENTRICULAR INTERNAL DIMENSION IN DIASTOLE: 4.74 CM (ref 3.5–6)
LEFT VENTRICULAR MASS: 283.02 G
LV LATERAL E/E' RATIO: 14.09 M/S
LV SEPTAL E/E' RATIO: 19.38 M/S
LVOT MG: 3.59 MMHG
LVOT MV: 0.91 CM/S
MV MEAN GRADIENT: 8 MMHG
MV PEAK A VEL: 1.75 M/S
MV PEAK E VEL: 1.55 M/S
MV PEAK GRADIENT: 12 MMHG
MV STENOSIS PRESSURE HALF TIME: 64.14 MS
MV VALVE AREA BY CONTINUITY EQUATION: 1.87 CM2
MV VALVE AREA P 1/2 METHOD: 3.43 CM2
OHS LV EJECTION FRACTION SIMPSONS BIPLANE MOD: 5 %
PISA AR MAX VEL: 4.98 M/S
PISA TR MAX VEL: 3.56 M/S
PV MV: 0.67 M/S
PV PEAK VELOCITY: 0.94 CM/S
RA MAJOR: 4.99 CM
RIGHT VENTRICULAR END-DIASTOLIC DIMENSION: 3.45 CM
RV TISSUE DOPPLER FREE WALL SYSTOLIC VELOCITY 1 (APICAL 4 CHAMBER VIEW): 9.12 CM/S
STJ: 3.08 CM
TDI LATERAL: 0.11 M/S
TDI SEPTAL: 0.08 M/S
TDI: 0.1 M/S
TR MAX PG: 51 MMHG

## 2023-06-27 ENCOUNTER — OFFICE VISIT (OUTPATIENT)
Dept: CARDIOLOGY | Facility: CLINIC | Age: 73
End: 2023-06-27
Payer: MEDICARE

## 2023-06-27 VITALS
HEART RATE: 72 BPM | HEIGHT: 70 IN | WEIGHT: 169.63 LBS | DIASTOLIC BLOOD PRESSURE: 68 MMHG | BODY MASS INDEX: 24.28 KG/M2 | SYSTOLIC BLOOD PRESSURE: 131 MMHG

## 2023-06-27 DIAGNOSIS — I10 PRIMARY HYPERTENSION: Chronic | ICD-10-CM

## 2023-06-27 DIAGNOSIS — Z86.79 H/O BACTERIAL ENDOCARDITIS: ICD-10-CM

## 2023-06-27 DIAGNOSIS — Z86.79 H/O ATRIAL FLUTTER: ICD-10-CM

## 2023-06-27 DIAGNOSIS — I49.3 PVC'S (PREMATURE VENTRICULAR CONTRACTIONS): ICD-10-CM

## 2023-06-27 DIAGNOSIS — Z95.1 S/P CABG X 1: ICD-10-CM

## 2023-06-27 DIAGNOSIS — Z95.2 S/P MVR (MITRAL VALVE REPLACEMENT): Primary | ICD-10-CM

## 2023-06-27 PROCEDURE — 3078F PR MOST RECENT DIASTOLIC BLOOD PRESSURE < 80 MM HG: ICD-10-PCS | Mod: CPTII,S$GLB,, | Performed by: INTERNAL MEDICINE

## 2023-06-27 PROCEDURE — 1101F PT FALLS ASSESS-DOCD LE1/YR: CPT | Mod: CPTII,S$GLB,, | Performed by: INTERNAL MEDICINE

## 2023-06-27 PROCEDURE — 1101F PR PT FALLS ASSESS DOC 0-1 FALLS W/OUT INJ PAST YR: ICD-10-PCS | Mod: CPTII,S$GLB,, | Performed by: INTERNAL MEDICINE

## 2023-06-27 PROCEDURE — 99213 OFFICE O/P EST LOW 20 MIN: CPT | Mod: 25,S$GLB,, | Performed by: INTERNAL MEDICINE

## 2023-06-27 PROCEDURE — 1159F PR MEDICATION LIST DOCUMENTED IN MEDICAL RECORD: ICD-10-PCS | Mod: CPTII,S$GLB,, | Performed by: INTERNAL MEDICINE

## 2023-06-27 PROCEDURE — 93000 EKG 12-LEAD: ICD-10-PCS | Mod: S$GLB,,, | Performed by: INTERNAL MEDICINE

## 2023-06-27 PROCEDURE — 3044F PR MOST RECENT HEMOGLOBIN A1C LEVEL <7.0%: ICD-10-PCS | Mod: CPTII,S$GLB,, | Performed by: INTERNAL MEDICINE

## 2023-06-27 PROCEDURE — 3075F PR MOST RECENT SYSTOLIC BLOOD PRESS GE 130-139MM HG: ICD-10-PCS | Mod: CPTII,S$GLB,, | Performed by: INTERNAL MEDICINE

## 2023-06-27 PROCEDURE — 1160F PR REVIEW ALL MEDS BY PRESCRIBER/CLIN PHARMACIST DOCUMENTED: ICD-10-PCS | Mod: CPTII,S$GLB,, | Performed by: INTERNAL MEDICINE

## 2023-06-27 PROCEDURE — 3288F FALL RISK ASSESSMENT DOCD: CPT | Mod: CPTII,S$GLB,, | Performed by: INTERNAL MEDICINE

## 2023-06-27 PROCEDURE — 3078F DIAST BP <80 MM HG: CPT | Mod: CPTII,S$GLB,, | Performed by: INTERNAL MEDICINE

## 2023-06-27 PROCEDURE — 99213 PR OFFICE/OUTPT VISIT, EST, LEVL III, 20-29 MIN: ICD-10-PCS | Mod: 25,S$GLB,, | Performed by: INTERNAL MEDICINE

## 2023-06-27 PROCEDURE — 3008F BODY MASS INDEX DOCD: CPT | Mod: CPTII,S$GLB,, | Performed by: INTERNAL MEDICINE

## 2023-06-27 PROCEDURE — 93000 ELECTROCARDIOGRAM COMPLETE: CPT | Mod: S$GLB,,, | Performed by: INTERNAL MEDICINE

## 2023-06-27 PROCEDURE — 1160F RVW MEDS BY RX/DR IN RCRD: CPT | Mod: CPTII,S$GLB,, | Performed by: INTERNAL MEDICINE

## 2023-06-27 PROCEDURE — 99999 PR PBB SHADOW E&M-EST. PATIENT-LVL III: CPT | Mod: PBBFAC,,, | Performed by: INTERNAL MEDICINE

## 2023-06-27 PROCEDURE — 3075F SYST BP GE 130 - 139MM HG: CPT | Mod: CPTII,S$GLB,, | Performed by: INTERNAL MEDICINE

## 2023-06-27 PROCEDURE — 3008F PR BODY MASS INDEX (BMI) DOCUMENTED: ICD-10-PCS | Mod: CPTII,S$GLB,, | Performed by: INTERNAL MEDICINE

## 2023-06-27 PROCEDURE — 99999 PR PBB SHADOW E&M-EST. PATIENT-LVL III: ICD-10-PCS | Mod: PBBFAC,,, | Performed by: INTERNAL MEDICINE

## 2023-06-27 PROCEDURE — 1126F PR PAIN SEVERITY QUANTIFIED, NO PAIN PRESENT: ICD-10-PCS | Mod: CPTII,S$GLB,, | Performed by: INTERNAL MEDICINE

## 2023-06-27 PROCEDURE — 1159F MED LIST DOCD IN RCRD: CPT | Mod: CPTII,S$GLB,, | Performed by: INTERNAL MEDICINE

## 2023-06-27 PROCEDURE — 3288F PR FALLS RISK ASSESSMENT DOCUMENTED: ICD-10-PCS | Mod: CPTII,S$GLB,, | Performed by: INTERNAL MEDICINE

## 2023-06-27 PROCEDURE — 3044F HG A1C LEVEL LT 7.0%: CPT | Mod: CPTII,S$GLB,, | Performed by: INTERNAL MEDICINE

## 2023-06-27 PROCEDURE — 1126F AMNT PAIN NOTED NONE PRSNT: CPT | Mod: CPTII,S$GLB,, | Performed by: INTERNAL MEDICINE

## 2023-06-27 RX ORDER — AZITHROMYCIN 500 MG/1
500 TABLET, FILM COATED ORAL ONCE
Qty: 4 TABLET | Refills: 11 | Status: SHIPPED | OUTPATIENT
Start: 2023-06-27 | End: 2023-06-27

## 2023-06-27 NOTE — PROGRESS NOTES
Subjective:      Patient ID: Rodolfo Messina is a 73 y.o. male.    Chief Complaint: Follow-up    HPI:  Grandson .      Walks some    Gardens    Review of Systems   Cardiovascular:  Negative for chest pain, claudication, dyspnea on exertion, irregular heartbeat, leg swelling, near-syncope, orthopnea, palpitations and syncope.      Past Medical History:   Diagnosis Date    ANNA (acute kidney injury) 2018    Anemia     Anxiety     BPH (benign prostatic hyperplasia)     Coarse tremors     Coronary artery disease of native artery of native heart with stable angina pectoris 2018    Diverticulosis     Encounter for blood transfusion     Endocarditis     Mechanical heart valve present     Non Hodgkin's lymphoma 2018    Urinary retention         Past Surgical History:   Procedure Laterality Date    ANTERIOR CERVICAL DISCECTOMY N/A 10/16/2019    Procedure: DISCECTOMY, SPINE, CERVICAL, ANTERIOR C6/7 ACDF;  Surgeon: Jay Reyes MD;  Location: [facility] OR [address] [place] [address];  Service: Neurosurgery;  Laterality: N/A;    APPENDECTOMY      COLONOSCOPY      COLONOSCOPY N/A 2018    Procedure: COLONOSCOPY;  Surgeon: Richar Payan MD;  Location: [facility] (4TH FLR);  Service: Endoscopy;  Laterality: N/A;  PM prep    CORONARY ARTERY BYPASS GRAFT (CABG) N/A 2018    Procedure: AORTOCORONARY BYPASS-CABG;  Surgeon: Marvin Go MD;  [facility];  Service: Cardiovascular;  Laterality: N/A;    EYE SURGERY Right     cataract extraction    FINGER SURGERY      FRACTURE SURGERY Right     index finger    MITRAL VALVE REPLACEMENT N/A 2018    Procedure: Mitral Valve Replacement;  Surgeon: Marvin Go MD;  Location: [facility] OR [address] [place] [address];  Service: Cardiovascular;  Laterality: N/A;    TONSILLECTOMY         Family History   Problem Relation Age of Onset    Hypertension Mother     Stroke Mother     Heart disease Mother         CABG    Coronary artery disease Mother     Coronary artery disease Father     Heart  disease Father         CABG    No Known Problems Sister     No Known Problems Brother     Melanoma Neg Hx        Social History     Socioeconomic History    Marital status:    Occupational History     Employer: [employer]   Tobacco Use    Smoking status: Never    Smokeless tobacco: Never   Substance and Sexual Activity    Alcohol use: Yes     Comment: social    Drug use: No    Sexual activity: Not Currently     Partners: Female     Social Determinants of Health     Financial Resource Strain: Low Risk     Difficulty of Paying Living Expenses: Not hard at all   Food Insecurity: No Food Insecurity    Worried About Running Out of Food in the Last Year: Never true    Ran Out of Food in the Last Year: Never true   Transportation Needs: No Transportation Needs    Lack of Transportation (Medical): No    Lack of Transportation (Non-Medical): No   Physical Activity: Unknown    Days of Exercise per Week: Patient refused    Minutes of Exercise per Session: Patient refused   Stress: No Stress Concern Present    Feeling of Stress : Not at all   Social Connections: Unknown    Frequency of Communication with Friends and Family: Patient refused    Frequency of Social Gatherings with Friends and Family: Patient refused    Attends [employer] Services: Patient refused    Active Member of Clubs or Organizations: Patient refused    Attends Club or Organization Meetings: Patient refused    Marital Status:    Housing Stability: Low Risk     Unable to Pay for Housing in the Last Year: No    Number of Places Lived in the Last Year: 1    Unstable Housing in the Last Year: No       Current Outpatient Medications on File Prior to Visit   Medication Sig Dispense Refill    acetaminophen (TYLENOL) 650 MG TbSR Take 650 mg by mouth every 8 (eight) hours.      amLODIPine (NORVASC) 5 MG tablet Take 1 tablet (5 mg total) by mouth once daily. 90 tablet 3    ascorbic acid, vitamin C, (VITAMIN C) 1000 MG tablet Take 1,000 mg by mouth every other  "day.      aspirin (ECOTRIN) 81 MG EC tablet Take 81 mg by mouth once daily.      atorvastatin (LIPITOR) 40 MG tablet GIVE 1 TABLET VIA G TUBE DAILY 90 tablet 3    cetirizine (ZYRTEC) 10 MG tablet Take 10 mg by mouth once daily.      fluticasone propionate (FLONASE) 50 mcg/actuation nasal spray 1 spray by Each Nostril route once daily.      gentamicin (GARAMYCIN) 0.3 % ophthalmic solution       lactose-reduced food (ENSURE HIGH PROTEIN-MUSCLE ORAL) Take by mouth once daily.       magnesium 250 mg Tab Take by mouth once daily.      melatonin 10 mg Tab Take by mouth as needed.       mirtazapine (REMERON) 30 MG tablet Take 1 tablet (30 mg total) by mouth every evening. 90 tablet 3    multivit-min/FA/lycopen/lutein (ADULTS 50 PLUS ORAL) Take by mouth once daily.       pantoprazole (PROTONIX) 40 MG tablet TAKE 1 TABLET EVERY DAY 90 tablet 3    SAW PALMETTO ORAL Take 450 mg by mouth once daily.      tamsulosin (FLOMAX) 0.4 mg Cap Take 2 capsules (0.8 mg total) by mouth once daily. (Patient taking differently: Take 0.4 mg by mouth once daily.) 180 capsule 0    ALPRAZolam (XANAX) 0.25 MG tablet Take 1 tablet (0.25 mg total) by mouth 3 (three) times daily as needed for Anxiety. (Patient not taking: Reported on 5/22/2023) 30 tablet 1     No current facility-administered medications on file prior to visit.       Review of patient's allergies indicates:   Allergen Reactions    Metoprolol Diarrhea    Shellfish containing products     Augmentin [amoxicillin-pot clavulanate]      Patient felt that it raised BP and requested to have it added as intolerance. Tolerated unasyn and ampicillin.    Ciprofloxacin     Flagyl [metronidazole]     Lisinopril Other (See Comments)     cough    Mysoline [primidone]     Omnicef [cefdinir]      Objective:     Vitals:    06/27/23 1416   BP: 131/68   BP Location: Right arm   Patient Position: Sitting   BP Method: Large (Automatic)   Pulse: 72   Weight: 76.9 kg (169 lb 10.3 oz)   Height: 5' 10" (1.778 " m)        Physical Exam  Constitutional:       General: He is not in acute distress.     Appearance: He is well-developed. He is not diaphoretic.   Eyes:      General: No scleral icterus.  Neck:      Vascular: No carotid bruit or JVD.   Cardiovascular:      Rate and Rhythm: Regular rhythm.      Heart sounds: Normal heart sounds. No murmur heard.    No friction rub. No gallop.   Pulmonary:      Effort: Pulmonary effort is normal. No respiratory distress.      Breath sounds: Normal breath sounds.   Musculoskeletal:      Right lower leg: No edema.      Left lower leg: No edema.   Skin:     General: Skin is warm and dry.   Neurological:      Mental Status: He is alert and oriented to person, place, and time.   Psychiatric:         Behavior: Behavior normal.         Thought Content: Thought content normal.         Judgment: Judgment normal.          ECG today reviewed by me: NSR with first degree AV block, otherwise L        Hospital Outpatient Visit on 06/23/2023   Component Date Value Ref Range Status    BSA 06/23/2023 1.95  m2 Final    TDI SEPTAL 06/23/2023 0.08  m/s Final    LV LATERAL E/E' RATIO 06/23/2023 14.09  m/s Final    LV SEPTAL E/E' RATIO 06/23/2023 19.38  m/s Final    LA WIDTH 06/23/2023 5.20  cm Final    Left Ventricular Outflow Tract Ranjana* 06/23/2023 0.91  cm/s Final    Left Ventricular Outflow Tract Ranjana* 06/23/2023 3.59  mmHg Final    Pulmonary Valve Mean Velocity 06/23/2023 0.67  m/s Final    AORTIC VALVE CUSP SEPERATION 06/23/2023 2.05  cm Final    TDI LATERAL 06/23/2023 0.11  m/s Final    PV PEAK VELOCITY 06/23/2023 0.94  cm/s Final    LVIDd 06/23/2023 4.74  3.5 - 6.0 cm Final    IVS 06/23/2023 1.53 (A)  0.6 - 1.1 cm Final    Posterior Wall 06/23/2023 1.37 (A)  0.6 - 1.1 cm Final    Ao root annulus 06/23/2023 2.48  cm Final    LVIDs 06/23/2023 3.53  2.1 - 4.0 cm Final    FS 06/23/2023 26  28 - 44 % Final    LA volume 06/23/2023 129.18  cm3 Final    STJ 06/23/2023 3.08  cm Final    Ascending aorta  06/23/2023 2.89  cm Final    LV mass 06/23/2023 283.02  g Final    LA size 06/23/2023 5.29  cm Final    RVDD 06/23/2023 3.45  cm Final    RV S' 06/23/2023 9.12  cm/s Final    Left Ventricle Relative Wall Thick* 06/23/2023 0.58  cm Final    AV regurgitation pressure 1/2 time 06/23/2023 266.450877266963934  ms Final    AV mean gradient 06/23/2023 6  mmHg Final    AV valve area 06/23/2023 3.21  cm2 Final    AV Velocity Ratio 06/23/2023 0.76   Final    AV index (prosthetic) 06/23/2023 0.77   Final    MV mean gradient 06/23/2023 8  mmHg Final    MV valve area p 1/2 method 06/23/2023 3.43  cm2 Final    MV valve area by continuity eq 06/23/2023 1.87  cm2 Final    E/A ratio 06/23/2023 0.89   Final    Mean e' 06/23/2023 0.10  m/s Final    E wave deceleration time 06/23/2023 221.18  msec Final    LVOT diameter 06/23/2023 2.31  cm Final    LVOT area 06/23/2023 4.2  cm2 Final    LVOT peak julito 06/23/2023 1.23  m/s Final    LVOT peak VTI 06/23/2023 29.10  cm Final    Ao peak julito 06/23/2023 1.62  m/s Final    Ao VTI 06/23/2023 38.0  cm Final    LVOT stroke volume 06/23/2023 121.90  cm3 Final    AV peak gradient 06/23/2023 10  mmHg Final    MV peak gradient 06/23/2023 12  mmHg Final    E/E' ratio 06/23/2023 16.32  m/s Final    MV Peak E Julito 06/23/2023 1.55  m/s Final    AR Max Julito 06/23/2023 4.98  m/s Final    TR Max Julito 06/23/2023 3.56  m/s Final    MV VTI 06/23/2023 65.2  cm Final    MV stenosis pressure 1/2 time 06/23/2023 64.14  ms Final    MV Peak A Julito 06/23/2023 1.75  m/s Final    LV Systolic Volume 06/23/2023 51.84  mL Final    LV Systolic Volume Index 06/23/2023 26.6  mL/m2 Final    LV Diastolic Volume 06/23/2023 104.36  mL Final    LV Diastolic Volume Index 06/23/2023 53.52  mL/m2 Final    LA Volume Index 06/23/2023 66.2  mL/m2 Final    LV Mass Index 06/23/2023 145  g/m2 Final    RA Major Axis 06/23/2023 4.99  cm Final    Left Atrium Minor Axis 06/23/2023 5.56  cm Final    Left Atrium Major Axis 06/23/2023 5.49  cm Final     Triscuspid Valve Regurgitation Pea* 06/23/2023 51  mmHg Final    LA Volume Index (Mod) 06/23/2023 66.0  mL/m2 Final    LA volume (mod) 06/23/2023 128.62  cm3 Final    Barajas's Biplane MOD Ejection Fra* 06/23/2023 5  % Final    EF 06/23/2023 50  % Final   Lab Visit on 05/18/2023   Component Date Value Ref Range Status    Sodium 05/18/2023 144  136 - 145 mmol/L Final    Potassium 05/18/2023 4.0  3.5 - 5.1 mmol/L Final    Chloride 05/18/2023 112 (H)  95 - 110 mmol/L Final    CO2 05/18/2023 25  23 - 29 mmol/L Final    Glucose 05/18/2023 92  70 - 110 mg/dL Final    BUN 05/18/2023 21 (H)  2 - 20 mg/dL Final    Creatinine 05/18/2023 1.21  0.50 - 1.40 mg/dL Final    Calcium 05/18/2023 9.2  8.7 - 10.5 mg/dL Final    Total Protein 05/18/2023 6.5  6.0 - 8.4 g/dL Final    Albumin 05/18/2023 4.0  3.5 - 5.2 g/dL Final    Total Bilirubin 05/18/2023 1.1 (H)  0.1 - 1.0 mg/dL Final    Alkaline Phosphatase 05/18/2023 53  38 - 126 U/L Final    AST 05/18/2023 24  15 - 46 U/L Final    ALT 05/18/2023 22  10 - 44 U/L Final    Anion Gap 05/18/2023 7 (L)  8 - 16 mmol/L Final    eGFR 05/18/2023 >60.0  >60 mL/min/1.73 m^2 Final    WBC 05/18/2023 3.71 (L)  3.90 - 12.70 K/uL Final    RBC 05/18/2023 3.48 (L)  4.60 - 6.20 M/uL Final    Hemoglobin 05/18/2023 11.5 (L)  14.0 - 18.0 g/dL Final    Hematocrit 05/18/2023 34.3 (L)  40.0 - 54.0 % Final    MCV 05/18/2023 99 (H)  82 - 98 fL Final    MCH 05/18/2023 33.0 (H)  27.0 - 31.0 pg Final    MCHC 05/18/2023 33.5  32.0 - 36.0 g/dL Final    RDW 05/18/2023 12.3  11.5 - 14.5 % Final    Platelets 05/18/2023 109 (L)  150 - 450 K/uL Final    MPV 05/18/2023 10.9  9.2 - 12.9 fL Final    Immature Granulocytes 05/18/2023 0.0  0.0 - 0.5 % Final    Gran # (ANC) 05/18/2023 2.2  1.8 - 7.7 K/uL Final    Immature Grans (Abs) 05/18/2023 0.00  0.00 - 0.04 K/uL Final    Lymph # 05/18/2023 0.9 (L)  1.0 - 4.8 K/uL Final    Mono # 05/18/2023 0.4  0.3 - 1.0 K/uL Final    Eos # 05/18/2023 0.2  0.0 - 0.5 K/uL Final    Baso  # 05/18/2023 0.02  0.00 - 0.20 K/uL Final    nRBC 05/18/2023 0  0 /100 WBC Final    Gran % 05/18/2023 60.4  38.0 - 73.0 % Final    Lymph % 05/18/2023 25.1  18.0 - 48.0 % Final    Mono % 05/18/2023 9.7  4.0 - 15.0 % Final    Eosinophil % 05/18/2023 4.3  0.0 - 8.0 % Final    Basophil % 05/18/2023 0.5  0.0 - 1.9 % Final    Differential Method 05/18/2023 Automated   Final    Cholesterol 05/18/2023 114 (L)  120 - 199 mg/dL Final    Triglycerides 05/18/2023 68  30 - 150 mg/dL Final    HDL 05/18/2023 47  40 - 75 mg/dL Final    LDL Cholesterol 05/18/2023 53.4 (L)  63.0 - 159.0 mg/dL Final    HDL/Cholesterol Ratio 05/18/2023 41.2  20.0 - 50.0 % Final    Total Cholesterol/HDL Ratio 05/18/2023 2.4  2.0 - 5.0 Final    Non-HDL Cholesterol 05/18/2023 67  mg/dL Final    TSH 05/18/2023 2.640  0.400 - 4.000 uIU/mL Final    Hemoglobin A1C 05/18/2023 5.3  4.0 - 5.6 % Final    Estimated Avg Glucose 05/18/2023 105  68 - 131 mg/dL Final    PSA, Screen 05/18/2023 1.6  0.00 - 4.00 ng/mL Final   (  Accession #: [id number]  Transthoracic echo (TTE) complete  Order# [id number]  Reading physician: Yousuf Lockett MD Ordering physician: Yousuf Lockett MD Study date: 6/23/23     Reason for Exam  Priority: Routine  Dx: S/P MVR (mitral valve replacement) [Z95.2 (ICD-10-CM)]; S/P CABG x 1 [Z95.1 (ICD-10-CM)]; Primary hypertension [I10 (ICD-10-CM)]; H/O atrial flutter [Z86.79 (ICD-10-CM)]; PVC's (premature ventricular contractions) [I49.3 (ICD-10-CM)]; Essential tremor [G25.0 (ICD-10-CM)]; H/O bacterial endocarditis [Z86.79 (ICD-10-CM)]     View Images Vital Vitrea     Show images for Echo Saline Bubble? No  Summary    The left ventricle is normal in size with moderate concentric hypertrophy and low normal systolic function.  Severe left atrial enlargement.  Moderate aortic regurgitation.  There is a bioprosthetic mitral valve.  The mean diastolic gradient across the mitral valve is 8 mmHg at a heart rate of 76 bpm.  There is mild to moderate  mitral stenosis.  Severe right atrial enlargement.  Severe right ventricular enlargement.  The estimated ejection fraction is 50%.  Grade II left ventricular diastolic dysfunction.  Moderate tricuspid regurgitation.  There is abnormal septal wall motion consistent with post-operative status.  Mild to moderate pulmonic regurgitation.      Authorization Time:  1:18 PM              DATE OF PROCEDURE:  05/25/2018     PREOPERATIVE DIAGNOSES:  1.  Acute mitral endocarditis.  2.  Severe mitral regurgitation.  3.  Mild to moderate aortic insufficiency.  4.  Coronary artery disease.  5.  Low ejection fraction.  6.  Heart failure, acute on chronic, systolic and diastolic heart failure.  7.  Status post intraaortic balloon pump.     POSTOPERATIVE DIAGNOSES:  1.  Acute mitral endocarditis.  2.  Severe mitral regurgitation.  3.  Mild to moderate aortic insufficiency.  4.  Coronary artery disease.  5.  Low ejection fraction.  6.  Heart failure, acute on chronic, systolic and diastolic heart failure.  7.  Status post intraaortic balloon pump.     OPERATIONS:  1.  Mitral valve replacement.  2.  CABG x1 (LIMA-LAD).  3.  Takedown of the left internal mammary artery, skeletonized technique.     STAFF SURGEON:  Marvin Go M.D.     FIRST ASSISTANT:  Christoph Saldaña M.D. (RES)     SECOND ASSISTANT:  Lauren Giang.     ANESTHESIA:  GETA.     ESTIMATED BLOOD LOSS:  200 mL.     KEY FINDINGS OF THE OPERATION:  1.  Extreme destruction of the mitral valve.  Both anterior and posterior   leaflets with large vegetations, requiring almost near complete excision of the   anterior and the posterior leaflets with maintenance of subvalvular apparatus.  2.  Good quality of left internal mammary artery with excellent flow.  3.  Good hemostasis.  4.  Aortic insufficiency was mild as well as the mitral insufficiency and hence   no intervention was done.     INDICATION OF OPERATION:  This is a 67-year-old debilitated patient who was   admitted with  acute endocarditis in the hospital.  The patient was wheelchair   bound.  The patient over the last ____ completed his antibiotic course and was   improved significantly and his nutritional status and was maintained on   guideline directed heart failure medications and now a decision was made to   proceed for replacement or repair of his mitral valve as well as other valves.    Risks, benefits, and alternatives were discussed.  The patient was brought   electively to the hospital and placed with an intraaortic balloon pump to help   in unloading of the heart, especially with the severe mitral regurgitation,   fluid overload, to ensure diuresis, and improvement of end organ function.  Once   that was done, an informed consent was obtained for the operation.     DESCRIPTION OF OPERATION:  The patient was brought to the Operating Room and   placed in a supine position.  After induction of anesthesia, area was prepped   and draped in the usual sterile fashion.  Previously placed midline incision was   made, which was carried all the way down to the sternum.  A median sternotomy   was then performed.  A chest retractor was then placed and the pericardium was   then opened up.  A Rultract retractor was placed on the left side and the left   internal mammary artery was then taken down in a skeletonized fashion from the   origin all the way down to the bifurcation.  Systemic heparinization was carried   out.  Once ACT greater than 450 was obtained, the distal portion of the left   internal mammary artery was clipped and ligated.  It was prepared with   papaverine to remove any vasospasm.  Cannulation stitches were placed.  Arterial   cannula was in the ascending aorta.  Venous cannula was placed in the SVC and   the IVC to obtain a bicaval cannulation strategy.  Circumferential dissection of   the SVC and the IVC was done and placement of caval tapes was down.  Once that   was done, the patient was placed on full  cardiopulmonary bypass.  Antegrade   cardioplegia catheter was placed in the ascending aorta.  Cross clamp was   applied and a cardiac standstill was obtained.  Caval tapes were cinched down.    The right atrium was opened up.  Direct visualization of the coronary sinus was   done and cannulation of the cardioplegia catheter was done to deliver   cardioplegia.  Once that was achieved, the septum was opened.  The interatrial   septum was incised and the mitral retractor was placed for exposure.  The   incision was extended into the dome of the left atrium.  Once that was achieved,   visualization of the anterior and the posterior leaflet of the mitral valve was   carried out.  Extensive destruction and vegetation both of the leaflets was   present.  The anterior leaflet was completely excised.  However, we maintained   the subvalvular apparatus of both the anterior and the posterior leaflet.  In   the posterior leaflets, P1 and P3 were retained, but P2 had to be completely   excised because of large vegetation that was present.  Multiple pledgeted   everting stitches were placed around the annulus and tacking the subvalvular   structures with the sutures.  After sizing physiologically, a 27 bioprosthetic   valve was found to be a good fit.  A 27 Medtronic valve was brought to the field   and prepared.  The needles were passed through the sewing ring and cut and   passed off the field.  The sutures were tied down and cut.  Once that was done,   every 20 minutes, we were instilling cardioplegia for cardiac protection.  The   interatrial septum was closed using 4-0 Prolene pledgeted stitch in a continuous   running fashion.  The right atrium was then closed in a similar manner with a   4-0 Prolene stitch.  Attention was then directed towards the mid portion of the   LAD.  The LAD was dissected out and opened up and in the meantime, the left   internal mammary artery was spatulated and prepared for anastomosis using a  7-0   Prolene stitch.  A continuous running anastomosis was performed.  Once that was   done, a dose of hot shot was given over 3 minutes, followed by removal of cross   clamp.  The root vent was used for de-airing.  Instant cardiac activity was   noted.  No intracardiac air was noted.  Ventilation was resumed.  Electrolytes   were found to be within normal limits.  Gradually, the patient was weaned off   from cardiopulmonary bypass.  The patient  from cardiopulmonary bypass   without any issues.  Test dose of protamine was given followed by full dose of   protamine to reverse the effects of systemic heparin.  Midway dose, all the   various catheters and cannulas were removed.  No intracardiac air was noted.  No   paravalvular leak was noted.  No mitral regurgitation was noted.  No MS was   noted.  Aortic insufficiency was still mild as well as mild tricuspid   regurgitation.  No wall motion abnormality was noted.  At this stage, multiple   pacing wires were placed on the ventricular surfaces and brought through   separate skin incision.  Four drainage tubes were placed, two in the mediastinum   and one in each pleural space, and connected to Pleurovac for drainage   purposes.  Sternum was approximated with #6 stainless steel wires.  Skin and   subcutaneous tissues were closed in multiple layers.  A sterile dressing was   applied.  Terminal count of needles, sponges, and instrument was found to be   correct.     MEDICARE ATTESTATION:  I was present for all parts of the operation and Dr. Christoph Saldaña acted as my first assistant.        AB/GLORIA  dd: 05/25/2018 16:11:59 (CDT)  td: 05/25/2018 18:01:38 (CDT)  Doc ID   #[id number]  Job ID #[id number]           Assessment:     1. S/P MVR (mitral valve replacement)    2. S/P CABG x 1    3. PVC's (premature ventricular contractions)    4. H/O bacterial endocarditis    5. H/O atrial flutter    6. Primary hypertension      Plan:   Rodolfo was seen today for  follow-up.    Diagnoses and all orders for this visit:    S/P MVR (mitral valve replacement)  -     IN OFFICE EKG 12-LEAD (to Muse)    S/P CABG x 1  -     IN OFFICE EKG 12-LEAD (to Muse)    PVC's (premature ventricular contractions)  -     IN OFFICE EKG 12-LEAD (to Muse)    H/O bacterial endocarditis  -     IN OFFICE EKG 12-LEAD (to Muse)    H/O atrial flutter  -     IN OFFICE EKG 12-LEAD (to Muse)    Primary hypertension  -     IN OFFICE EKG 12-LEAD (to [place])    Other orders  -     azithromycin (ZITHROMAX) 500 MG tablet; Take 1 tablet (500 mg total) by mouth once. Take one tablet one hour before dental work. for 1 dose     Discussed grief counseling    Reviewed indication for azithromycin 500 mg once hour before dental work.    MV prosthesis is working well  LVEF is good  AR is stable    HBP is controlled    Same meds    RTC 6 months    Follow up in about 6 months (around 12/27/2023).

## 2023-07-06 ENCOUNTER — TELEPHONE (OUTPATIENT)
Dept: FAMILY MEDICINE | Facility: CLINIC | Age: 73
End: 2023-07-06
Payer: MEDICARE

## 2023-07-06 DIAGNOSIS — Z00.00 ROUTINE HEALTH MAINTENANCE: Primary | ICD-10-CM

## 2023-07-06 RX ORDER — EPINEPHRINE 0.3 MG/.3ML
1 INJECTION SUBCUTANEOUS ONCE
Qty: 2 EACH | Refills: 1 | Status: SHIPPED | OUTPATIENT
Start: 2023-07-06 | End: 2024-03-20

## 2023-07-06 NOTE — TELEPHONE ENCOUNTER
Patient requesting epipen. He has a seafood allergy and has had a recent small reaction but would prefer to be prepared - [facility]  Also requesting referral for external derm - dr bangura for a cyst on his back

## 2023-08-08 ENCOUNTER — PES CALL (OUTPATIENT)
Dept: ADMINISTRATIVE | Facility: CLINIC | Age: 73
End: 2023-08-08
Payer: MEDICARE

## 2023-08-18 ENCOUNTER — LAB VISIT (OUTPATIENT)
Dept: LAB | Facility: HOSPITAL | Age: 73
End: 2023-08-18
Attending: INTERNAL MEDICINE
Payer: MEDICARE

## 2023-08-18 DIAGNOSIS — C85.10 B-CELL LYMPHOMA, UNSPECIFIED B-CELL LYMPHOMA TYPE, UNSPECIFIED BODY REGION: ICD-10-CM

## 2023-08-18 LAB
ALBUMIN SERPL BCP-MCNC: 4 G/DL (ref 3.5–5.2)
ALP SERPL-CCNC: 49 U/L (ref 38–126)
ALT SERPL W/O P-5'-P-CCNC: 22 U/L (ref 10–44)
ANION GAP SERPL CALC-SCNC: 8 MMOL/L (ref 8–16)
AST SERPL-CCNC: 25 U/L (ref 15–46)
BASOPHILS # BLD AUTO: 0.02 K/UL (ref 0–0.2)
BASOPHILS NFR BLD: 0.5 % (ref 0–1.9)
BILIRUB SERPL-MCNC: 0.8 MG/DL (ref 0.1–1)
CALCIUM SERPL-MCNC: 9.2 MG/DL (ref 8.7–10.5)
CHLORIDE SERPL-SCNC: 111 MMOL/L (ref 95–110)
CO2 SERPL-SCNC: 25 MMOL/L (ref 23–29)
CREAT SERPL-MCNC: 1.34 MG/DL (ref 0.5–1.4)
DIFFERENTIAL METHOD: ABNORMAL
EOSINOPHIL # BLD AUTO: 0.2 K/UL (ref 0–0.5)
EOSINOPHIL NFR BLD: 3.5 % (ref 0–8)
ERYTHROCYTE [DISTWIDTH] IN BLOOD BY AUTOMATED COUNT: 12.5 % (ref 11.5–14.5)
EST. GFR  (NO RACE VARIABLE): 55.9 ML/MIN/1.73 M^2
GLUCOSE SERPL-MCNC: 114 MG/DL (ref 70–110)
HCT VFR BLD AUTO: 34.4 % (ref 40–54)
HGB BLD-MCNC: 11.3 G/DL (ref 14–18)
IMM GRANULOCYTES # BLD AUTO: 0.01 K/UL (ref 0–0.04)
IMM GRANULOCYTES NFR BLD AUTO: 0.2 % (ref 0–0.5)
LDH SERPL L TO P-CCNC: 190 U/L (ref 110–260)
LYMPHOCYTES # BLD AUTO: 1 K/UL (ref 1–4.8)
LYMPHOCYTES NFR BLD: 22.1 % (ref 18–48)
MCH RBC QN AUTO: 33.4 PG (ref 27–31)
MCHC RBC AUTO-ENTMCNC: 32.8 G/DL (ref 32–36)
MCV RBC AUTO: 102 FL (ref 82–98)
MONOCYTES # BLD AUTO: 0.5 K/UL (ref 0.3–1)
MONOCYTES NFR BLD: 10.7 % (ref 4–15)
NEUTROPHILS # BLD AUTO: 2.7 K/UL (ref 1.8–7.7)
NEUTROPHILS NFR BLD: 63 % (ref 38–73)
NRBC BLD-RTO: 0 /100 WBC
PLATELET # BLD AUTO: 126 K/UL (ref 150–450)
PMV BLD AUTO: 11 FL (ref 9.2–12.9)
POTASSIUM SERPL-SCNC: 5 MMOL/L (ref 3.5–5.1)
PROT SERPL-MCNC: 6.6 G/DL (ref 6–8.4)
RBC # BLD AUTO: 3.38 M/UL (ref 4.6–6.2)
SODIUM SERPL-SCNC: 144 MMOL/L (ref 136–145)
UUN UR-MCNC: 28 MG/DL (ref 2–20)
WBC # BLD AUTO: 4.3 K/UL (ref 3.9–12.7)

## 2023-08-18 PROCEDURE — 36415 COLL VENOUS BLD VENIPUNCTURE: CPT | Mod: HCNC,PO | Performed by: NURSE PRACTITIONER

## 2023-08-18 PROCEDURE — 80053 COMPREHEN METABOLIC PANEL: CPT | Mod: HCNC,PO | Performed by: NURSE PRACTITIONER

## 2023-08-18 PROCEDURE — 85025 COMPLETE CBC W/AUTO DIFF WBC: CPT | Mod: HCNC,PO | Performed by: NURSE PRACTITIONER

## 2023-08-18 PROCEDURE — 83615 LACTATE (LD) (LDH) ENZYME: CPT | Mod: HCNC | Performed by: NURSE PRACTITIONER

## 2023-08-20 NOTE — HPI
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     Patient with complicated recent medical hx, with prolonged 28d hospitalization 5/2018 after CABG / MVR with course complicated by SBE - started on rocephin / amp end date 7/13, dysphagia - s/p PEG, and urinary retention with lackey placement (recently removed 6/26 by urology). Pt was recently admitted to [facility] 6/23 for anemia, Hb 6.7 at that time, down from 7.0 6/20 and with dyspnea thought to be related to symptomatic anemia, pt mildly volume overloaded as well, was diuresed and transfused 2u PRBC with discharge Hb of 8.1     Per above, pt saw urology 6/26 for indwelling lackey / urinary retention, underwent voiding trial and patient passed, lackey was removed, to continue flomax and stop doxazosin.      6/28 pt began to note blood in urine, worsening to point appearing to be willa blood, presented to ED 6/29. Pt denies other sources of bleeding (no hematochezia, melena), denies worsening SOB/chest pain/palpitations, denies dysuria or difficulty urinating   TRANSFER - OUT REPORT:    Verbal report given to GREG Smith on Maxim Tipton  being transferred to [phone] for routine progression of patient care       Report consisted of patient's Situation, Background, Assessment and   Recommendations(SBAR). Information from the following report(s) Nurse Handoff Report was reviewed with the receiving nurse. Kinder Fall Assessment:                           Lines:   Single Lumen Implantable Port 03/16/23 Right Subclavian (Active)        Opportunity for questions and clarification was provided.       Patient transported with:  Monitor           Court Sees, RN  08/20/23 4431

## 2023-08-22 ENCOUNTER — OFFICE VISIT (OUTPATIENT)
Dept: HEMATOLOGY/ONCOLOGY | Facility: CLINIC | Age: 73
End: 2023-08-22
Payer: MEDICARE

## 2023-08-22 VITALS
TEMPERATURE: 98 F | WEIGHT: 172.06 LBS | BODY MASS INDEX: 24.63 KG/M2 | HEART RATE: 70 BPM | OXYGEN SATURATION: 97 % | HEIGHT: 70 IN | SYSTOLIC BLOOD PRESSURE: 147 MMHG | DIASTOLIC BLOOD PRESSURE: 67 MMHG | RESPIRATION RATE: 16 BRPM

## 2023-08-22 DIAGNOSIS — E53.8 B12 DEFICIENCY: ICD-10-CM

## 2023-08-22 DIAGNOSIS — C85.10 B-CELL LYMPHOMA, UNSPECIFIED B-CELL LYMPHOMA TYPE, UNSPECIFIED BODY REGION: Primary | ICD-10-CM

## 2023-08-22 DIAGNOSIS — D64.9 ANEMIA, UNSPECIFIED TYPE: ICD-10-CM

## 2023-08-22 PROCEDURE — 3077F SYST BP >= 140 MM HG: CPT | Mod: HCNC,CPTII,S$GLB, | Performed by: NURSE PRACTITIONER

## 2023-08-22 PROCEDURE — 3078F DIAST BP <80 MM HG: CPT | Mod: HCNC,CPTII,S$GLB, | Performed by: NURSE PRACTITIONER

## 2023-08-22 PROCEDURE — 3008F BODY MASS INDEX DOCD: CPT | Mod: HCNC,CPTII,S$GLB, | Performed by: NURSE PRACTITIONER

## 2023-08-22 PROCEDURE — 3078F PR MOST RECENT DIASTOLIC BLOOD PRESSURE < 80 MM HG: ICD-10-PCS | Mod: HCNC,CPTII,S$GLB, | Performed by: NURSE PRACTITIONER

## 2023-08-22 PROCEDURE — 3044F HG A1C LEVEL LT 7.0%: CPT | Mod: HCNC,CPTII,S$GLB, | Performed by: NURSE PRACTITIONER

## 2023-08-22 PROCEDURE — 1160F PR REVIEW ALL MEDS BY PRESCRIBER/CLIN PHARMACIST DOCUMENTED: ICD-10-PCS | Mod: HCNC,CPTII,S$GLB, | Performed by: NURSE PRACTITIONER

## 2023-08-22 PROCEDURE — 99214 OFFICE O/P EST MOD 30 MIN: CPT | Mod: HCNC,S$GLB,, | Performed by: NURSE PRACTITIONER

## 2023-08-22 PROCEDURE — 99999 PR PBB SHADOW E&M-EST. PATIENT-LVL IV: CPT | Mod: PBBFAC,HCNC,, | Performed by: NURSE PRACTITIONER

## 2023-08-22 PROCEDURE — 3008F PR BODY MASS INDEX (BMI) DOCUMENTED: ICD-10-PCS | Mod: HCNC,CPTII,S$GLB, | Performed by: NURSE PRACTITIONER

## 2023-08-22 PROCEDURE — 1126F AMNT PAIN NOTED NONE PRSNT: CPT | Mod: HCNC,CPTII,S$GLB, | Performed by: NURSE PRACTITIONER

## 2023-08-22 PROCEDURE — 3077F PR MOST RECENT SYSTOLIC BLOOD PRESSURE >= 140 MM HG: ICD-10-PCS | Mod: HCNC,CPTII,S$GLB, | Performed by: NURSE PRACTITIONER

## 2023-08-22 PROCEDURE — 99214 PR OFFICE/OUTPT VISIT, EST, LEVL IV, 30-39 MIN: ICD-10-PCS | Mod: HCNC,S$GLB,, | Performed by: NURSE PRACTITIONER

## 2023-08-22 PROCEDURE — 1159F MED LIST DOCD IN RCRD: CPT | Mod: HCNC,CPTII,S$GLB, | Performed by: NURSE PRACTITIONER

## 2023-08-22 PROCEDURE — 1160F RVW MEDS BY RX/DR IN RCRD: CPT | Mod: HCNC,CPTII,S$GLB, | Performed by: NURSE PRACTITIONER

## 2023-08-22 PROCEDURE — 99999 PR PBB SHADOW E&M-EST. PATIENT-LVL IV: ICD-10-PCS | Mod: PBBFAC,HCNC,, | Performed by: NURSE PRACTITIONER

## 2023-08-22 PROCEDURE — 3044F PR MOST RECENT HEMOGLOBIN A1C LEVEL <7.0%: ICD-10-PCS | Mod: HCNC,CPTII,S$GLB, | Performed by: NURSE PRACTITIONER

## 2023-08-22 PROCEDURE — 1159F PR MEDICATION LIST DOCUMENTED IN MEDICAL RECORD: ICD-10-PCS | Mod: HCNC,CPTII,S$GLB, | Performed by: NURSE PRACTITIONER

## 2023-08-22 PROCEDURE — 1126F PR PAIN SEVERITY QUANTIFIED, NO PAIN PRESENT: ICD-10-PCS | Mod: HCNC,CPTII,S$GLB, | Performed by: NURSE PRACTITIONER
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
## 2023-09-17 NOTE — PLAN OF CARE
Problem: Fluid Volume Excess, Risk for  Goal: # Absence of Rapid Weight Gain (no more than 2kg in 24 hours)  Description: FVE Risk Patients may gain weight (but not more than 2 kg) but may not require aggressive treatment if in the absence of dyspnea; FVE (actual) patients should be monitored to achieve no weight gain.   Outcome: Outcome Met, Continue evaluating goal progress toward completion  Goal: # Absence of New Onset Dyspnea  Description: Dyspnea greater than SOB with Activity may be indicator of fluid volume excess  Outcome: Outcome Met, Continue evaluating goal progress toward completion     Problem: Fluid Volume Excess  Goal: # Oxygenation is maintained (SpO2 greater than or equal to 90% or as ordered)  Outcome: Outcome Met, Continue evaluating goal progress toward completion     Problem: Activity Intolerance  Goal: # Functional status is maintained or returned to baseline  Outcome: Outcome Met, Continue evaluating goal progress toward completion     Problem: Stroke: Ischemic (Transient/Permanent)  Goal: Neurological status is maintained/restored to status at baseline  Description: Monitor neurological and mental status including symptoms of increasing intracranial pressure (headache, nausea/vomiting, or change in behavior). Hypertension (greater than 180 systolic) may also indicate a change in status related to stroke.  Outcome: Outcome Met, Continue evaluating goal progress toward completion  Goal: Normal temperature is maintained  Outcome: Outcome Met, Continue evaluating goal progress toward completion      Problem: Fall Risk (Adult)  Goal: Absence of Falls  Patient will demonstrate the desired outcomes by discharge/transition of care.   Outcome: Ongoing (interventions implemented as appropriate)   03/04/18 1649   Fall Risk (Adult)   Absence of Falls making progress toward outcome       Problem: Patient Care Overview  Goal: Plan of Care Review  Outcome: Ongoing (interventions implemented as appropriate)   03/04/18 1649   Coping/Psychosocial   Plan Of Care Reviewed With patient;spouse       Problem: Breathing Pattern Ineffective (Adult)  Goal: Identify Related Risk Factors and Signs and Symptoms  Related risk factors and signs and symptoms are identified upon initiation of Human Response Clinical Practice Guideline (CPG)   Outcome: Ongoing (interventions implemented as appropriate)   03/04/18 1649   Breathing Pattern Ineffective   Related Risk Factors (Breathing Pattern Ineffective) infection;underlying condition   Signs and Symptoms (Breathing Pattern Ineffective) cough ineffective;breath sounds abnormal

## 2023-10-18 NOTE — PLAN OF CARE
Patient: Vikki Butcher Date: 10/18/2023   : 1965 Attending: Belle Bonilla*   58 year old female      Chief Complaint   Patient presents with   • Follow-up     CKD: creat normal   Hypertension    • Hypertension    seen in consultation for Dr Belle Bonilla for thn  HPI: Vikki is a 58 year old female who presents today for htn, diagnosed 10 years ago.  Never saw a nephrologist before.  No kidney stones, UTI, or NSAID abuse.  No over-the-counter cold medications, adrenal or thyroid disorders.  All her family members have hypertension.  No electrolyte disorders.  No palpitations or sweats.  Notices her heart rate gets into the 40s and she occasionally feels tired and weak.  Also with dry mouth.  No nausea or vomiting, fevers or chills,  symptoms or edema.  10/18/23 since seen last no hospns. No sweats/tremors/edema. 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 Aguirre   • Colonoscopy  10/06/2015    Dr. Aguirre, 5yr. FU, +family hx.   • Colonoscopy w biopsy  2021    Dr. Marcus Aguirre - benign polyp; recall due 2026 d/t family hx of colon cancer   • Extracapsular cataract removal w insert io lens prosth wo ecp Right 2017    SN60WF 17.0 Dr. Derrick Villalba   • Eye surgery  2017   • Full rout obste care,vaginal deliv     • Hysteroscopy w/ polypectomy  2022    with D&C   • Induced abortn by dil/evac  1983     elective termination of pregnancy   • Mammo  Problem: Patient Care Overview  Goal: Plan of Care Review  Outcome: Ongoing (interventions implemented as appropriate)  Plan of care reviewed with patient. Patient verbalized understanding. Patient receiving 2nd of 2 units PRBCs; tolerating well. Patient using own pump for continuous IV antibiotic therapy. PICC line dressing clean, dry and intact with biopatch in place. Tube feeding infusing @ 50 cc/hour without complication. Pressure injury to right buttock cleaned with normal saline and dressed with foam dressing; surrounding skin covered with barrier ointment. Patient encouraged to turn frequently to prevent further skin breakdown. Patient NSR on telemetry monitoring, HR 70's-80's, with no true red alarms noted. Bed is low and locked, bed alarm is activated, call light is within reach. Patient has been instructed to call if in need of assistance. Verbalized understanding.        stereotactic biopsy right Right 2018    Benign Stereo   • Past surgical history      Madeline teeth and gum surgery     Social History     Socioeconomic History   • Marital status: /Civil Union     Spouse name: Noel   • Number of children: 1   • Years of education: 12   • Highest education level: Not on file   Occupational History   • Occupation:      Comment: [employer]   Tobacco Use   • Smoking status: Never   • Smokeless tobacco: Never   Vaping Use   • Vaping Use: Not on file   Substance and Sexual Activity   • Alcohol use: Yes     Alcohol/week: 1.0 standard drink of alcohol     Types: 1 Glasses of wine per week     Comment: rare occasions   • Drug use: No   • Sexual activity: Yes     Partners: Male     Birth control/protection: None   Other Topics Concern   •  Service Not Asked   • Blood Transfusions Not Asked   • Caffeine Concern Not Asked   • Occupational Exposure Not Asked   • Hobby Hazards Not Asked   • Sleep Concern Not Asked   • Stress Concern Not Asked   • Weight Concern Not Asked   • Special Diet Not Asked   • Back Care Not Asked   • Exercise Yes     Comment: Body pump 2-3 x's weekly   • Bike Helmet Not Asked   • Seat Belt Not Asked   • Self-Exams Not Asked   Social History Narrative    Menarche age 12    Blood type is O positive    No female ca        Hobbies:    Dogs     Social Determinants of Health     Financial Resource Strain: Not on file   Food Insecurity: Not on file   Transportation Needs: Not on file   Physical Activity: Not on file   Stress: Not on file   Social Connections: Not on file   Intimate Partner Violence: Not on file     Family History   Problem Relation Age of Onset   • Cancer, Colon Mother 59   • Hypertension Father    • Hyperlipidemia Father         hyperlipidemia   • Cancer, Prostate Father 60   • Heart disease Father          at 87   • Cancer, Breast Sister 44        BRCA negative   • Hypertension Sister    • Patient is  unaware of any medical problems Brother    • Patient is unaware of any medical problems Daughter    • Heart Maternal Grandmother         heart disease   • Heart Maternal Grandfather         heart disease   • Patient is unaware of any medical problems Paternal Grandmother    • Patient is unaware of any medical problems Paternal Grandfather    • Cancer, Lung Maternal Aunt    • Patient is unaware of any medical problems Maternal Aunt    • Patient is unaware of any medical problems Maternal Aunt    • Patient is unaware of any medical problems Maternal Uncle    • Patient is unaware of any medical problems Maternal Uncle    • Patient is unaware of any medical problems Maternal Uncle    • Cancer, Breast Maternal Cousin 50   • Other Other         no fam hx of ovarian ca   • Cancer, Breast Other         maternal cousin breast CA dx 50s   • Cancer, Ovarian Neg Hx      ALLERGIES:   Allergen Reactions   • Amlodipine Other (See Comments)     Gum overgrowth, mild ankle swelling.       Medications:  Current Outpatient Medications   Medication Sig Dispense Refill   • omeprazole (PrilOSEC) 20 MG capsule TAKE 1 CAPSULE BY MOUTH EVERY DAY 90 capsule 3   • metoPROLOL succinate (TOPROL-XL) 100 MG 24 hr tablet TAKE 1 TABLET BY MOUTH NIGHTLY 90 tablet 0   • valsartan-hydrochlorothiazide (DIOVAN-HCT) 320-25 MG per tablet TAKE 1 TABLET BY MOUTH EVERY DAY 90 tablet 0   • cloNIDine (CATAPRES) 0.1 MG tablet TAKE 1 TABLET BY MOUTH TWICE A DAY IN THE MORNING AND IN THE EVENING 180 tablet 1   • chlorhexidine gluconate (PERIDEX) 0.12 % solution RINSE AND SPIT 1/2 OUNCE AFTER BREAKFAST AND BEFORE BEDTIME       No current facility-administered medications for this visit.       Immunization Status:  Immunization History   Administered Date(s) Administered   • COVID Moderna 0.5 mL 12Y+ 04/02/2021, 04/30/2021   • COVID Moderna Booster 0.25 mL 12Y+ 12/14/2021   • Influenza, quadrivalent, multi-dose 11/02/2017, 10/30/2018   • Influenza, quadrivalent,  preserve-free 10/22/2019, 11/03/2020, 09/27/2021, 10/26/2022   • Influenza, seasonal, injectable, trivalent 10/27/2016   • Td:Adult type tetanus/diphtheria 05/18/1995, 08/30/2005   • Tdap 04/30/2015     Immunization History   Administered Date(s) Administered   • COVID Moderna 0.5 mL 12Y+ 04/02/2021, 04/30/2021   • COVID Moderna Booster 0.25 mL 12Y+ 12/14/2021   • Influenza, quadrivalent, multi-dose 11/02/2017, 10/30/2018   • Influenza, quadrivalent, preserve-free 10/22/2019, 11/03/2020, 09/27/2021, 10/26/2022   • Influenza, seasonal, injectable, trivalent 10/27/2016   • Td:Adult type tetanus/diphtheria 05/18/1995, 08/30/2005   • Tdap 04/30/2015       ROS: All systems reviewed and negative except for what is mentioned in the HPI.        3/28/2023     9:05 AM 4/19/2023    11:08 AM 4/19/2023    11:17 AM 8/5/2023     9:32 AM 10/18/2023     9:08 AM 10/18/2023     9:14 AM 10/18/2023     9:22 AM   Vitals   SYSTOLIC 158 182 180 180 162 150    DIASTOLIC 78 90 90 88 80 78    Heart Rate   60 73 78     Temp    98 °F (36.7 °C)      Resp    16      Weight kg  98.85 kg  99.338 kg 100.95 kg     Height    5' 6\"      BMI (Calculated)    35.35      BP - Patient Reported       120/70     Physical Exam:    GENERAL: Alert oriented x 3  HEENT: Normocephalic atraumatic  NECK: Supple, No JVD  CHEST: Symmetric air entry, Clear to ausculation bilaterally  HEART: Regular rate and rhythym,  S1, S2, No rub  ABDOMEN: Soft, Non-tender, No bruits  EXTREMITIES: No cyanosis, No clubbing, No edema  NEUROMUSCULAR: Moves all extremities, Grossly intact  SKIN: No rash, No lesions, No nodules    Laboratory Results:    Recent Labs     01/27/23  0729 10/17/23  1314   SODIUM 140 140   POTASSIUM 3.7 4.0   CHLORIDE 102 101   CO2 32 31   ANIONGAP 10 12   BUN 18 19   CREATININE 0.89 0.84   GLUCOSE 114* 95   CALCIUM 9.2 9.7   ALBUMIN 4.1  --      Urine Panel  Recent Labs     12/12/22  0943 12/12/22  1553   USPG  --  1.026   UPH  --  5.5   UPROT  --  30*   UROB   --  0.2   UNITR  --  Negative   UKET  --  Negative   UBILI  --  Negative   UWBC  --  Negative   URBC  --  Negative   5UPROT Negative  --      IMPRESSION: Despite borderline elevated proximal left renal artery  velocities. No evidence of hemodynamic significant renal artery stenosis  with normal RAR.    Diagnosis/Plan:    · CKD: creat normal  · Hypertension:  Appears essential. Dopplers neg for MICHELLE, alsosterone normal, bmp normal. No nsaids, cold meds, otc meds. Very strong fam hx. bp at home 110 - 120. bp here same as her cuff, so home readings accurrate. Sx better with less clonidine, wean off if < 110. Add cardizem, aldactone, or hydralazine if needed  · Secondary Hyperparathyroidism:  Check if azotemic  · Anemia:  15  · Electrolyte/Acid Base:  normal  · Volume Status:  No edema    Htn, appears essential. A lot of side effects with clonidine, better with decreased dose. Will try to wean off, substitute aldactone, cardizem, or hydralazine if needed. bp normal at home, occ 100. See in 1 yr, sooner if changes.

## 2023-11-06 RX ORDER — MIRTAZAPINE 30 MG/1
30 TABLET, FILM COATED ORAL NIGHTLY
Qty: 90 TABLET | Refills: 1 | Status: SHIPPED | OUTPATIENT
Start: 2023-11-06

## 2023-11-06 NOTE — TELEPHONE ENCOUNTER
Refill Decision Note   Rodolfo Messina  is requesting a refill authorization.  Brief Assessment and Rationale for Refill:  Approve     Medication Therapy Plan:         Comments:     Note composed:11:02 AM 11/06/2023

## 2023-11-06 NOTE — TELEPHONE ENCOUNTER
No care due was identified.  [facility] Embedded Care Due Messages. Reference number: [id number].   11/06/2023 10:54:42 AM CST

## 2024-01-16 ENCOUNTER — TELEPHONE (OUTPATIENT)
Dept: CARDIOLOGY | Facility: CLINIC | Age: 74
End: 2024-01-16
Payer: MEDICARE

## 2024-01-16 NOTE — TELEPHONE ENCOUNTER
----- Message from Mary De La Rosa sent at 1/16/2024  9:50 AM CST -----  Regarding: rescheudle  Name of caller: wife ( cecy )       What is the requesting detail: pt is requesting a call back to reschedule his appt. Please advise       Can the clinic reply by [facility]:       What number to call back: [phone]

## 2024-01-16 NOTE — TELEPHONE ENCOUNTER
LVM for Lupe, we received your message regarding cancelling patient's appointment and requesting to reschedule in [place].  I'm forwarding this message to  Cass (who is out today due to the weather conditions) to assist in rescheduling in [place].

## 2024-01-25 ENCOUNTER — TELEPHONE (OUTPATIENT)
Dept: CARDIOLOGY | Facility: CLINIC | Age: 74
End: 2024-01-25
Payer: MEDICARE

## 2024-01-25 NOTE — TELEPHONE ENCOUNTER
Reached out to patient to reschedule appointment missed due to the weather.  No answer. Left callback message.

## 2024-01-30 ENCOUNTER — TELEPHONE (OUTPATIENT)
Dept: CARDIOLOGY | Facility: CLINIC | Age: 74
End: 2024-01-30
Payer: MEDICARE

## 2024-01-30 NOTE — TELEPHONE ENCOUNTER
----- Message from Tanya Mckeon sent at 1/30/2024 12:19 PM CST -----  Type:  Needs Medical Advice    Who Called:  Pt wife   Would the patient rather a call back or a response via [facility]? Call back   Best Call Back Number:  [phone]  Additional Information:  Pt is requesting a call back from this provider office in regards to Blood work appt setup

## 2024-02-19 ENCOUNTER — TELEPHONE (OUTPATIENT)
Dept: CARDIOLOGY | Facility: CLINIC | Age: 74
End: 2024-02-19
Payer: MEDICARE

## 2024-02-19 NOTE — TELEPHONE ENCOUNTER
Returned call to patient.  No answer.  Left for patient stating no labs were ordered at last visit.    ----- Message from Matilde Awad sent at 2/16/2024  2:38 PM CST -----  Caller is requesting to schedule their Lab appointment prior to annual appointment.  Order is not listed in EPIC.  Please enter order and contact patient to schedule.    Name of Caller: Pt wife Lupe    Preferred Date and Time of Labs: 3/13/24    Date of [facility] Appointment: 3/20/24    Where would they like the lab performed? [facility]    Would the patient rather a call back or a response via [facility]?  call    Best Call Back Number:  [phone]    Additional Information:

## 2024-03-12 ENCOUNTER — LAB VISIT (OUTPATIENT)
Dept: LAB | Facility: HOSPITAL | Age: 74
End: 2024-03-12
Attending: FAMILY MEDICINE
Payer: MEDICARE

## 2024-03-12 DIAGNOSIS — R73.9 HYPERGLYCEMIA: ICD-10-CM

## 2024-03-12 DIAGNOSIS — I10 HYPERTENSION, UNSPECIFIED TYPE: ICD-10-CM

## 2024-03-12 DIAGNOSIS — Z95.1 S/P CABG X 1: ICD-10-CM

## 2024-03-12 DIAGNOSIS — Z12.5 SCREENING PSA (PROSTATE SPECIFIC ANTIGEN): ICD-10-CM

## 2024-03-12 DIAGNOSIS — Z00.00 ROUTINE HEALTH MAINTENANCE: ICD-10-CM

## 2024-03-12 LAB
ALBUMIN SERPL BCP-MCNC: 4.3 G/DL (ref 3.5–5.2)
ALP SERPL-CCNC: 63 U/L (ref 38–126)
ALT SERPL W/O P-5'-P-CCNC: 21 U/L (ref 10–44)
ANION GAP SERPL CALC-SCNC: 8 MMOL/L (ref 8–16)
AST SERPL-CCNC: 24 U/L (ref 15–46)
BASOPHILS # BLD AUTO: 0.03 K/UL (ref 0–0.2)
BASOPHILS NFR BLD: 0.7 % (ref 0–1.9)
BILIRUB SERPL-MCNC: 1.2 MG/DL (ref 0.1–1)
CALCIUM SERPL-MCNC: 9.5 MG/DL (ref 8.7–10.5)
CHLORIDE SERPL-SCNC: 110 MMOL/L (ref 95–110)
CHOLEST SERPL-MCNC: 115 MG/DL (ref 120–199)
CHOLEST/HDLC SERPL: 2.2 {RATIO} (ref 2–5)
CO2 SERPL-SCNC: 26 MMOL/L (ref 23–29)
COMPLEXED PSA SERPL-MCNC: 1.3 NG/ML (ref 0–4)
CREAT SERPL-MCNC: 1.39 MG/DL (ref 0.5–1.4)
DIFFERENTIAL METHOD BLD: ABNORMAL
EOSINOPHIL # BLD AUTO: 0.2 K/UL (ref 0–0.5)
EOSINOPHIL NFR BLD: 5.5 % (ref 0–8)
ERYTHROCYTE [DISTWIDTH] IN BLOOD BY AUTOMATED COUNT: 12.2 % (ref 11.5–14.5)
EST. GFR  (NO RACE VARIABLE): 53.5 ML/MIN/1.73 M^2
ESTIMATED AVG GLUCOSE: 103 MG/DL (ref 68–131)
GLUCOSE SERPL-MCNC: 93 MG/DL (ref 70–110)
HBA1C MFR BLD: 5.2 % (ref 4–5.6)
HCT VFR BLD AUTO: 37.9 % (ref 40–54)
HDLC SERPL-MCNC: 52 MG/DL (ref 40–75)
HDLC SERPL: 45.2 % (ref 20–50)
HGB BLD-MCNC: 12.5 G/DL (ref 14–18)
IMM GRANULOCYTES # BLD AUTO: 0.01 K/UL (ref 0–0.04)
IMM GRANULOCYTES NFR BLD AUTO: 0.2 % (ref 0–0.5)
LDLC SERPL CALC-MCNC: 49.4 MG/DL (ref 63–159)
LYMPHOCYTES # BLD AUTO: 1.2 K/UL (ref 1–4.8)
LYMPHOCYTES NFR BLD: 27.7 % (ref 18–48)
MCH RBC QN AUTO: 33 PG (ref 27–31)
MCHC RBC AUTO-ENTMCNC: 33 G/DL (ref 32–36)
MCV RBC AUTO: 100 FL (ref 82–98)
MONOCYTES # BLD AUTO: 0.4 K/UL (ref 0.3–1)
MONOCYTES NFR BLD: 9.2 % (ref 4–15)
NEUTROPHILS # BLD AUTO: 2.4 K/UL (ref 1.8–7.7)
NEUTROPHILS NFR BLD: 56.7 % (ref 38–73)
NONHDLC SERPL-MCNC: 63 MG/DL
NRBC BLD-RTO: 0 /100 WBC
PLATELET # BLD AUTO: 126 K/UL (ref 150–450)
PMV BLD AUTO: 11.1 FL (ref 9.2–12.9)
POTASSIUM SERPL-SCNC: 4.3 MMOL/L (ref 3.5–5.1)
PROT SERPL-MCNC: 7.4 G/DL (ref 6–8.4)
RBC # BLD AUTO: 3.79 M/UL (ref 4.6–6.2)
SODIUM SERPL-SCNC: 144 MMOL/L (ref 136–145)
TRIGL SERPL-MCNC: 68 MG/DL (ref 30–150)
TSH SERPL DL<=0.005 MIU/L-ACNC: 2.03 UIU/ML (ref 0.4–4)
UUN UR-MCNC: 22 MG/DL (ref 2–20)
WBC # BLD AUTO: 4.22 K/UL (ref 3.9–12.7)

## 2024-03-12 PROCEDURE — 36415 COLL VENOUS BLD VENIPUNCTURE: CPT | Mod: HCNC,PN | Performed by: FAMILY MEDICINE

## 2024-03-12 PROCEDURE — 80053 COMPREHEN METABOLIC PANEL: CPT | Mod: HCNC,PN | Performed by: FAMILY MEDICINE

## 2024-03-12 PROCEDURE — 83036 HEMOGLOBIN GLYCOSYLATED A1C: CPT | Mod: HCNC | Performed by: FAMILY MEDICINE

## 2024-03-12 PROCEDURE — 85025 COMPLETE CBC W/AUTO DIFF WBC: CPT | Mod: HCNC,PN | Performed by: FAMILY MEDICINE

## 2024-03-12 PROCEDURE — 84153 ASSAY OF PSA TOTAL: CPT | Mod: HCNC | Performed by: FAMILY MEDICINE

## 2024-03-12 PROCEDURE — 80061 LIPID PANEL: CPT | Mod: HCNC | Performed by: FAMILY MEDICINE

## 2024-03-12 PROCEDURE — 84443 ASSAY THYROID STIM HORMONE: CPT | Mod: HCNC,PN | Performed by: FAMILY MEDICINE

## 2024-03-13 ENCOUNTER — TELEPHONE (OUTPATIENT)
Dept: FAMILY MEDICINE | Facility: CLINIC | Age: 74
End: 2024-03-13
Payer: MEDICARE

## 2024-03-13 NOTE — TELEPHONE ENCOUNTER
----- Message from Deric Claudio MD sent at 3/13/2024  3:15 AM CDT -----  Your labs look very good  NO changes need to be made.

## 2024-03-20 ENCOUNTER — OFFICE VISIT (OUTPATIENT)
Dept: CARDIOLOGY | Facility: CLINIC | Age: 74
End: 2024-03-20
Payer: MEDICARE

## 2024-03-20 VITALS
SYSTOLIC BLOOD PRESSURE: 148 MMHG | DIASTOLIC BLOOD PRESSURE: 70 MMHG | HEART RATE: 65 BPM | OXYGEN SATURATION: 96 % | BODY MASS INDEX: 24.87 KG/M2 | HEIGHT: 70 IN | WEIGHT: 173.75 LBS

## 2024-03-20 DIAGNOSIS — Z95.1 S/P CABG X 1: ICD-10-CM

## 2024-03-20 DIAGNOSIS — N18.30 STAGE 3 CHRONIC KIDNEY DISEASE, UNSPECIFIED WHETHER STAGE 3A OR 3B CKD: ICD-10-CM

## 2024-03-20 DIAGNOSIS — I49.3 PVC'S (PREMATURE VENTRICULAR CONTRACTIONS): ICD-10-CM

## 2024-03-20 DIAGNOSIS — D64.9 NORMOCYTIC ANEMIA: ICD-10-CM

## 2024-03-20 DIAGNOSIS — I48.91 NEW ONSET ATRIAL FIBRILLATION: Primary | ICD-10-CM

## 2024-03-20 DIAGNOSIS — Z95.2 S/P MVR (MITRAL VALVE REPLACEMENT): ICD-10-CM

## 2024-03-20 DIAGNOSIS — I10 PRIMARY HYPERTENSION: Chronic | ICD-10-CM

## 2024-03-20 DIAGNOSIS — Z86.79 H/O BACTERIAL ENDOCARDITIS: ICD-10-CM

## 2024-03-20 LAB
OHS QRS DURATION: 98 MS
OHS QTC CALCULATION: 447 MS

## 2024-03-20 PROCEDURE — 3044F HG A1C LEVEL LT 7.0%: CPT | Mod: CPTII,S$GLB,, | Performed by: INTERNAL MEDICINE

## 2024-03-20 PROCEDURE — 3077F SYST BP >= 140 MM HG: CPT | Mod: CPTII,S$GLB,, | Performed by: INTERNAL MEDICINE

## 2024-03-20 PROCEDURE — 3288F FALL RISK ASSESSMENT DOCD: CPT | Mod: CPTII,S$GLB,, | Performed by: INTERNAL MEDICINE

## 2024-03-20 PROCEDURE — 93000 ELECTROCARDIOGRAM COMPLETE: CPT | Mod: S$GLB,,, | Performed by: INTERNAL MEDICINE

## 2024-03-20 PROCEDURE — 3078F DIAST BP <80 MM HG: CPT | Mod: CPTII,S$GLB,, | Performed by: INTERNAL MEDICINE

## 2024-03-20 PROCEDURE — 1101F PT FALLS ASSESS-DOCD LE1/YR: CPT | Mod: CPTII,S$GLB,, | Performed by: INTERNAL MEDICINE

## 2024-03-20 PROCEDURE — 3008F BODY MASS INDEX DOCD: CPT | Mod: CPTII,S$GLB,, | Performed by: INTERNAL MEDICINE

## 2024-03-20 PROCEDURE — 99214 OFFICE O/P EST MOD 30 MIN: CPT | Mod: 25,S$GLB,, | Performed by: INTERNAL MEDICINE

## 2024-03-20 PROCEDURE — 1126F AMNT PAIN NOTED NONE PRSNT: CPT | Mod: CPTII,S$GLB,, | Performed by: INTERNAL MEDICINE

## 2024-03-20 PROCEDURE — 1159F MED LIST DOCD IN RCRD: CPT | Mod: CPTII,S$GLB,, | Performed by: INTERNAL MEDICINE

## 2024-03-20 PROCEDURE — 99999 PR PBB SHADOW E&M-EST. PATIENT-LVL IV: CPT | Mod: PBBFAC,HCNC,, | Performed by: INTERNAL MEDICINE

## 2024-03-20 PROCEDURE — 1160F RVW MEDS BY RX/DR IN RCRD: CPT | Mod: CPTII,S$GLB,, | Performed by: INTERNAL MEDICINE

## 2024-03-20 RX ORDER — AMLODIPINE BESYLATE 10 MG/1
10 TABLET ORAL DAILY
Qty: 90 TABLET | Refills: 3 | Status: SHIPPED | OUTPATIENT
Start: 2024-03-20 | End: 2025-03-20

## 2024-03-20 RX ORDER — FLUTICASONE PROPIONATE 50 MCG
2 SPRAY, SUSPENSION (ML) NASAL DAILY
Qty: 18.2 ML | Refills: 11 | Status: SHIPPED | OUTPATIENT
Start: 2024-03-20

## 2024-03-20 NOTE — PROGRESS NOTES
Subjective:      Patient ID: Rodolfo Messina is a 73 y.o. male.    Chief Complaint: Follow-up    HPI:  Has hay fever from the pollen    Very active.     Mows the grass    Feels fine    Review of Systems   Cardiovascular:  Negative for chest pain, claudication, dyspnea on exertion, irregular heartbeat, leg swelling, near-syncope, orthopnea, palpitations and syncope.        Past Medical History:   Diagnosis Date    ANNA (acute kidney injury) 2/22/2018    Anemia     Anxiety     BPH (benign prostatic hyperplasia)     Coarse tremors     Coronary artery disease of native artery of native heart with stable angina pectoris 5/21/2018    Diverticulosis     Encounter for blood transfusion     Endocarditis     Mechanical heart valve present     New onset atrial fibrillation 3/20/2024    Non Hodgkin's lymphoma 8/2/2018    Urinary retention         Past Surgical History:   Procedure Laterality Date    ANTERIOR CERVICAL DISCECTOMY N/A 10/16/2019    Procedure: DISCECTOMY, SPINE, CERVICAL, ANTERIOR C6/7 ACDF;  Surgeon: Jay Reyes MD;  Location: [facility] OR [address] [place] [address];  Service: Neurosurgery;  Laterality: N/A;    APPENDECTOMY      COLONOSCOPY      COLONOSCOPY N/A 2/1/2018    Procedure: COLONOSCOPY;  Surgeon: Richar Payan MD;  Location: [facility] (4TH FLR);  Service: Endoscopy;  Laterality: N/A;  PM prep    CORONARY ARTERY BYPASS GRAFT (CABG) N/A 5/25/2018    Procedure: AORTOCORONARY BYPASS-CABG;  Surgeon: Marvin Go MD;  Location: [facility] OR [address] [place] [address];  Service: Cardiovascular;  Laterality: N/A;    EYE SURGERY Right     cataract extraction    FINGER SURGERY      FRACTURE SURGERY Right     index finger    MITRAL VALVE REPLACEMENT N/A 5/25/2018    Procedure: Mitral Valve Replacement;  Surgeon: Marvin Go MD;  Location: [facility] OR [address] [place] [address];  Service: Cardiovascular;  Laterality: N/A;    TONSILLECTOMY         Family History   Problem Relation Age of Onset    Hypertension Mother     Stroke Mother     Heart disease Mother         CABG     Coronary artery disease Mother     Coronary artery disease Father     Heart disease Father         CABG    No Known Problems Sister     No Known Problems Brother     Melanoma Neg Hx        Social History     Socioeconomic History    Marital status:    Occupational History     Employer: [employer]   Tobacco Use    Smoking status: Never    Smokeless tobacco: Never   Substance and Sexual Activity    Alcohol use: Yes     Comment: social    Drug use: No    Sexual activity: Not Currently     Partners: Female     Social Determinants of Health     Financial Resource Strain: Low Risk  (5/30/2023)    Overall Financial Resource Strain (CARDIA)     Difficulty of Paying Living Expenses: Not hard at all   Food Insecurity: No Food Insecurity (5/30/2023)    Hunger Vital Sign     Worried About Running Out of Food in the Last Year: Never true     Ran Out of Food in the Last Year: Never true   Transportation Needs: No Transportation Needs (5/30/2023)    PRAPARE - Transportation     Lack of Transportation (Medical): No     Lack of Transportation (Non-Medical): No   Physical Activity: Patient Declined (5/30/2023)    Exercise Vital Sign     Days of Exercise per Week: Patient declined     Minutes of Exercise per Session: Patient declined   Stress: No Stress Concern Present (5/30/2023)    [place] [employer] - Occupational Stress Questionnaire     Feeling of Stress : Not at all   Social Connections: Unknown (5/30/2023)    Social Connection and Isolation Panel [NHANES]     Frequency of Communication with Friends and Family: Patient declined     Frequency of Social Gatherings with Friends and Family: Patient declined     Attends Restorationist Services: Patient declined     Active Member of Clubs or Organizations: Patient declined     Attends Club or Organization Meetings: Patient declined     Marital Status:    Housing Stability: Low Risk  (5/30/2023)    Housing Stability Vital Sign     Unable to Pay for  Housing in the Last Year: No     Number of Places Lived in the Last Year: 1     Unstable Housing in the Last Year: No       Current Outpatient Medications on File Prior to Visit   Medication Sig Dispense Refill    acetaminophen (TYLENOL) 650 MG TbSR Take 650 mg by mouth every 8 (eight) hours.      ALPRAZolam (XANAX) 0.25 MG tablet Take 1 tablet (0.25 mg total) by mouth 3 (three) times daily as needed for Anxiety. 30 tablet 1    ascorbic acid, vitamin C, (VITAMIN C) 1000 MG tablet Take 1,000 mg by mouth every other day.      atorvastatin (LIPITOR) 40 MG tablet GIVE 1 TABLET VIA G TUBE DAILY 90 tablet 3    cetirizine (ZYRTEC) 10 MG tablet Take 10 mg by mouth once daily.      EPINEPHrine (EPIPEN) 0.3 mg/0.3 mL AtIn Inject 0.3 mLs (0.3 mg total) into the muscle once. for 1 dose 2 each 1    lactose-reduced food (ENSURE HIGH PROTEIN-MUSCLE ORAL) Take by mouth once daily.       magnesium 250 mg Tab Take by mouth once daily.      melatonin 10 mg Tab Take by mouth as needed.       mirtazapine (REMERON) 30 MG tablet TAKE 1 TABLET EVERY EVENING 90 tablet 1    multivit-min/FA/lycopen/lutein (ADULTS 50 PLUS ORAL) Take by mouth once daily.       pantoprazole (PROTONIX) 40 MG tablet TAKE 1 TABLET EVERY DAY 90 tablet 3    SAW PALMETTO ORAL Take 450 mg by mouth once daily.      tamsulosin (FLOMAX) 0.4 mg Cap Take 2 capsules (0.8 mg total) by mouth once daily. (Patient taking differently: Take 0.4 mg by mouth once daily.) 180 capsule 0    [DISCONTINUED] amLODIPine (NORVASC) 5 MG tablet Take 1 tablet (5 mg total) by mouth once daily. 90 tablet 3    [DISCONTINUED] aspirin (ECOTRIN) 81 MG EC tablet Take 81 mg by mouth once daily.      [DISCONTINUED] fluticasone propionate (FLONASE) 50 mcg/actuation nasal spray 1 spray by Each Nostril route once daily.      [DISCONTINUED] gentamicin (GARAMYCIN) 0.3 % ophthalmic solution        No current facility-administered medications on file prior to visit.       Review of patient's allergies  "indicates:   Allergen Reactions    Metoprolol Diarrhea    Shellfish containing products     Augmentin [amoxicillin-pot clavulanate]      Patient felt that it raised BP and requested to have it added as intolerance. Tolerated unasyn and ampicillin.    Ciprofloxacin     Flagyl [metronidazole]     Lisinopril Other (See Comments)     cough    Mysoline [primidone]     Omnicef [cefdinir]      Objective:     Vitals:    03/20/24 1052 03/20/24 1058 03/20/24 1125   BP: (!) 163/70 (!) 157/75 (!) 148/70   BP Location: Right arm Left arm Left arm   Patient Position: Sitting Sitting Sitting   BP Method: Large (Automatic) Large (Automatic)    Pulse: 65     SpO2: 96%     Weight: 78.8 kg (173 lb 11.6 oz)     Height: 5' 10" (1.778 m)          Physical Exam  Constitutional:       General: He is not in acute distress.     Appearance: He is well-developed. He is not diaphoretic.   Eyes:      General: No scleral icterus.  Neck:      Vascular: No carotid bruit or JVD.   Cardiovascular:      Rate and Rhythm: Rhythm irregularly irregular.      Heart sounds: Normal heart sounds. No murmur heard.     No friction rub. No gallop.   Pulmonary:      Effort: Pulmonary effort is normal. No respiratory distress.      Breath sounds: Normal breath sounds.   Musculoskeletal:      Right lower leg: No edema.      Left lower leg: No edema.   Skin:     General: Skin is warm and dry.   Neurological:      Mental Status: He is alert and oriented to person, place, and time.   Psychiatric:         Behavior: Behavior normal.         Thought Content: Thought content normal.         Judgment: Judgment normal.      ECG today: atrial fibrillation with controlled ventricular response,  PVC, LVH,  a fib is new since last tracing      Lab Visit on 03/12/2024   Component Date Value Ref Range Status    Sodium 03/12/2024 144  136 - 145 mmol/L Final    Potassium 03/12/2024 4.3  3.5 - 5.1 mmol/L Final    Chloride 03/12/2024 110  95 - 110 mmol/L Final    CO2 03/12/2024 26  " 23 - 29 mmol/L Final    Glucose 03/12/2024 93  70 - 110 mg/dL Final    BUN 03/12/2024 22 (H)  2 - 20 mg/dL Final    Creatinine 03/12/2024 1.39  0.50 - 1.40 mg/dL Final    Calcium 03/12/2024 9.5  8.7 - 10.5 mg/dL Final    Total Protein 03/12/2024 7.4  6.0 - 8.4 g/dL Final    Albumin 03/12/2024 4.3  3.5 - 5.2 g/dL Final    Total Bilirubin 03/12/2024 1.2 (H)  0.1 - 1.0 mg/dL Final    Alkaline Phosphatase 03/12/2024 63  38 - 126 U/L Final    AST 03/12/2024 24  15 - 46 U/L Final    ALT 03/12/2024 21  10 - 44 U/L Final    Anion Gap 03/12/2024 8  8 - 16 mmol/L Final    eGFR 03/12/2024 53.5 (A)  >60 mL/min/1.73 m^2 Final    WBC 03/12/2024 4.22  3.90 - 12.70 K/uL Final    RBC 03/12/2024 3.79 (L)  4.60 - 6.20 M/uL Final    Hemoglobin 03/12/2024 12.5 (L)  14.0 - 18.0 g/dL Final    Hematocrit 03/12/2024 37.9 (L)  40.0 - 54.0 % Final    MCV 03/12/2024 100 (H)  82 - 98 fL Final    MCH 03/12/2024 33.0 (H)  27.0 - 31.0 pg Final    MCHC 03/12/2024 33.0  32.0 - 36.0 g/dL Final    RDW 03/12/2024 12.2  11.5 - 14.5 % Final    Platelets 03/12/2024 126 (L)  150 - 450 K/uL Final    MPV 03/12/2024 11.1  9.2 - 12.9 fL Final    Immature Granulocytes 03/12/2024 0.2  0.0 - 0.5 % Final    Gran # (ANC) 03/12/2024 2.4  1.8 - 7.7 K/uL Final    Immature Grans (Abs) 03/12/2024 0.01  0.00 - 0.04 K/uL Final    Lymph # 03/12/2024 1.2  1.0 - 4.8 K/uL Final    Mono # 03/12/2024 0.4  0.3 - 1.0 K/uL Final    Eos # 03/12/2024 0.2  0.0 - 0.5 K/uL Final    Baso # 03/12/2024 0.03  0.00 - 0.20 K/uL Final    nRBC 03/12/2024 0  0 /100 WBC Final    Gran % 03/12/2024 56.7  38.0 - 73.0 % Final    Lymph % 03/12/2024 27.7  18.0 - 48.0 % Final    Mono % 03/12/2024 9.2  4.0 - 15.0 % Final    Eosinophil % 03/12/2024 5.5  0.0 - 8.0 % Final    Basophil % 03/12/2024 0.7  0.0 - 1.9 % Final    Differential Method 03/12/2024 Automated   Final    Cholesterol 03/12/2024 115 (L)  120 - 199 mg/dL Final    Triglycerides 03/12/2024 68  30 - 150 mg/dL Final    HDL 03/12/2024 52  40 -  75 mg/dL Final    LDL Cholesterol 03/12/2024 49.4 (L)  63.0 - 159.0 mg/dL Final    HDL/Cholesterol Ratio 03/12/2024 45.2  20.0 - 50.0 % Final    Total Cholesterol/HDL Ratio 03/12/2024 2.2  2.0 - 5.0 Final    Non-HDL Cholesterol 03/12/2024 63  mg/dL Final    TSH 03/12/2024 2.030  0.400 - 4.000 uIU/mL Final    Hemoglobin A1C 03/12/2024 5.2  4.0 - 5.6 % Final    Estimated Avg Glucose 03/12/2024 103  68 - 131 mg/dL Final    PSA, Screen 03/12/2024 1.3  0.00 - 4.00 ng/mL Final   (  Accession #: [id number]  Transthoracic echo (TTE) complete  Order# [id number]  Reading physician: Yousuf Lockett MD Ordering physician: Yousuf oLckett MD Study date: 6/23/23     Reason for Exam  Priority: Routine  Dx: S/P MVR (mitral valve replacement) [Z95.2 (ICD-10-CM)]; S/P CABG x 1 [Z95.1 (ICD-10-CM)]; Primary hypertension [I10 (ICD-10-CM)]; H/O atrial flutter [Z86.79 (ICD-10-CM)]; PVC's (premature ventricular contractions) [I49.3 (ICD-10-CM)]; Essential tremor [G25.0 (ICD-10-CM)]; H/O bacterial endocarditis [Z86.79 (ICD-10-CM)]     View Images Vital Vitrea     Show images for Echo Saline Bubble? No  Summary    The left ventricle is normal in size with moderate concentric hypertrophy and low normal systolic function.  Severe left atrial enlargement.  Moderate aortic regurgitation.  There is a bioprosthetic mitral valve.  The mean diastolic gradient across the mitral valve is 8 mmHg at a heart rate of 76 bpm.  There is mild to moderate mitral stenosis.  Severe right atrial enlargement.  Severe right ventricular enlargement.  The estimated ejection fraction is 50%.  Grade II left ventricular diastolic dysfunction.  Moderate tricuspid regurgitation.  There is abnormal septal wall motion consistent with post-operative status.  Mild to moderate pulmonic regurgitation.      Authorization Time:  1:18 PM              DATE OF PROCEDURE:  05/25/2018     PREOPERATIVE DIAGNOSES:  1.  Acute mitral endocarditis.  2.  Severe mitral  regurgitation.  3.  Mild to moderate aortic insufficiency.  4.  Coronary artery disease.  5.  Low ejection fraction.  6.  Heart failure, acute on chronic, systolic and diastolic heart failure.  7.  Status post intraaortic balloon pump.     POSTOPERATIVE DIAGNOSES:  1.  Acute mitral endocarditis.  2.  Severe mitral regurgitation.  3.  Mild to moderate aortic insufficiency.  4.  Coronary artery disease.  5.  Low ejection fraction.  6.  Heart failure, acute on chronic, systolic and diastolic heart failure.  7.  Status post intraaortic balloon pump.     OPERATIONS:  1.  Mitral valve replacement.  2.  CABG x1 (LIMA-LAD).  3.  Takedown of the left internal mammary artery, skeletonized technique.     STAFF SURGEON:  Marvin Go M.D.     FIRST ASSISTANT:  Christoph Saldaña M.D. (RES)     SECOND ASSISTANT:  Lauren Giang.     ANESTHESIA:  GETA.     ESTIMATED BLOOD LOSS:  200 mL.     KEY FINDINGS OF THE OPERATION:  1.  Extreme destruction of the mitral valve.  Both anterior and posterior   leaflets with large vegetations, requiring almost near complete excision of the   anterior and the posterior leaflets with maintenance of subvalvular apparatus.  2.  Good quality of left internal mammary artery with excellent flow.  3.  Good hemostasis.  4.  Aortic insufficiency was mild as well as the mitral insufficiency and hence   no intervention was done.     INDICATION OF OPERATION:  This is a 67-year-old debilitated patient who was   admitted with acute endocarditis in the hospital.  The patient was wheelchair   bound.  The patient over the last ____ completed his antibiotic course and was   improved significantly and his nutritional status and was maintained on   guideline directed heart failure medications and now a decision was made to   proceed for replacement or repair of his mitral valve as well as other valves.    Risks, benefits, and alternatives were discussed.  The patient was brought   electively to the hospital and placed  with an intraaortic balloon pump to help   in unloading of the heart, especially with the severe mitral regurgitation,   fluid overload, to ensure diuresis, and improvement of end organ function.  Once   that was done, an informed consent was obtained for the operation.     DESCRIPTION OF OPERATION:  The patient was brought to the Operating Room and   placed in a supine position.  After induction of anesthesia, area was prepped   and draped in the usual sterile fashion.  Previously placed midline incision was   made, which was carried all the way down to the sternum.  A median sternotomy   was then performed.  A chest retractor was then placed and the pericardium was   then opened up.  A Rultract retractor was placed on the left side and the left   internal mammary artery was then taken down in a skeletonized fashion from the   origin all the way down to the bifurcation.  Systemic heparinization was carried   out.  Once ACT greater than 450 was obtained, the distal portion of the left   internal mammary artery was clipped and ligated.  It was prepared with   papaverine to remove any vasospasm.  Cannulation stitches were placed.  Arterial   cannula was in the ascending aorta.  Venous cannula was placed in the SVC and   the IVC to obtain a bicaval cannulation strategy.  Circumferential dissection of   the SVC and the IVC was done and placement of caval tapes was down.  Once that   was done, the patient was placed on full cardiopulmonary bypass.  Antegrade   cardioplegia catheter was placed in the ascending aorta.  Cross clamp was   applied and a cardiac standstill was obtained.  Caval tapes were cinched down.    The right atrium was opened up.  Direct visualization of the coronary sinus was   done and cannulation of the cardioplegia catheter was done to deliver   cardioplegia.  Once that was achieved, the septum was opened.  The interatrial   septum was incised and the mitral retractor was placed for exposure.  The    incision was extended into the dome of the left atrium.  Once that was achieved,   visualization of the anterior and the posterior leaflet of the mitral valve was   carried out.  Extensive destruction and vegetation both of the leaflets was   present.  The anterior leaflet was completely excised.  However, we maintained   the subvalvular apparatus of both the anterior and the posterior leaflet.  In   the posterior leaflets, P1 and P3 were retained, but P2 had to be completely   excised because of large vegetation that was present.  Multiple pledgeted   everting stitches were placed around the annulus and tacking the subvalvular   structures with the sutures.  After sizing physiologically, a 27 bioprosthetic   valve was found to be a good fit.  A 27 Medtronic valve was brought to the field   and prepared.  The needles were passed through the sewing ring and cut and   passed off the field.  The sutures were tied down and cut.  Once that was done,   every 20 minutes, we were instilling cardioplegia for cardiac protection.  The   interatrial septum was closed using 4-0 Prolene pledgeted stitch in a continuous   running fashion.  The right atrium was then closed in a similar manner with a   4-0 Prolene stitch.  Attention was then directed towards the mid portion of the   LAD.  The LAD was dissected out and opened up and in the meantime, the left   internal mammary artery was spatulated and prepared for anastomosis using a 7-0   Prolene stitch.  A continuous running anastomosis was performed.  Once that was   done, a dose of hot shot was given over 3 minutes, followed by removal of cross   clamp.  The root vent was used for de-airing.  Instant cardiac activity was   noted.  No intracardiac air was noted.  Ventilation was resumed.  Electrolytes   were found to be within normal limits.  Gradually, the patient was weaned off   from cardiopulmonary bypass.  The patient  from cardiopulmonary bypass   without any  issues.  Test dose of protamine was given followed by full dose of   protamine to reverse the effects of systemic heparin.  Midway dose, all the   various catheters and cannulas were removed.  No intracardiac air was noted.  No   paravalvular leak was noted.  No mitral regurgitation was noted.  No MS was   noted.  Aortic insufficiency was still mild as well as mild tricuspid   regurgitation.  No wall motion abnormality was noted.  At this stage, multiple   pacing wires were placed on the ventricular surfaces and brought through   separate skin incision.  Four drainage tubes were placed, two in the mediastinum   and one in each pleural space, and connected to Pleurovac for drainage   purposes.  Sternum was approximated with #6 stainless steel wires.  Skin and   subcutaneous tissues were closed in multiple layers.  A sterile dressing was   applied.  Terminal count of needles, sponges, and instrument was found to be   correct.     MEDICARE ATTESTATION:  I was present for all parts of the operation and Dr. Christoph Saldaña acted as my first assistant.        AB/GLORIA  dd: 05/25/2018 16:11:59 (CDT)  td: 05/25/2018 18:01:38 (CDT)  Doc ID   #[id number]  Job ID #[id number]             Assessment:     1. New onset atrial fibrillation    2. S/P MVR (mitral valve replacement)    3. S/P CABG x 1    4. PVC's (premature ventricular contractions)    5. Primary hypertension    6. H/O bacterial endocarditis    7. Stage 3 chronic kidney disease, unspecified whether stage 3a or 3b CKD    8. Normocytic anemia    9.     B cell lymphoma        Plan:   Rodolfo was seen today for follow-up.    Diagnoses and all orders for this visit:    New onset atrial fibrillation  -     IN OFFICE EKG 12-LEAD (to [place])  -     Echo Saline Bubble? No; Future  -     CBC Auto Differential; Future  -     Comprehensive Metabolic Panel; Future  -     TSH; Future    S/P MVR (mitral valve replacement)  -     IN OFFICE EKG 12-LEAD (to [place])  -     Echo Saline Bubble? No;  Future  -     CBC Auto Differential; Future  -     Comprehensive Metabolic Panel; Future  -     TSH; Future    S/P CABG x 1  -     IN OFFICE EKG 12-LEAD (to [place])  -     Echo Saline Bubble? No; Future  -     CBC Auto Differential; Future  -     Comprehensive Metabolic Panel; Future  -     TSH; Future    PVC's (premature ventricular contractions)  -     IN OFFICE EKG 12-LEAD (to [place])  -     Echo Saline Bubble? No; Future  -     CBC Auto Differential; Future  -     Comprehensive Metabolic Panel; Future  -     TSH; Future    Primary hypertension  -     IN OFFICE EKG 12-LEAD (to [place])  -     Echo Saline Bubble? No; Future  -     CBC Auto Differential; Future  -     Comprehensive Metabolic Panel; Future  -     TSH; Future    H/O bacterial endocarditis  -     IN OFFICE EKG 12-LEAD (to [place])  -     Echo Saline Bubble? No; Future  -     CBC Auto Differential; Future  -     Comprehensive Metabolic Panel; Future  -     TSH; Future    Stage 3 chronic kidney disease, unspecified whether stage 3a or 3b CKD  -     IN OFFICE EKG 12-LEAD (to [place])  -     Echo Saline Bubble? No; Future  -     CBC Auto Differential; Future  -     Comprehensive Metabolic Panel; Future  -     TSH; Future    Normocytic anemia  -     IN OFFICE EKG 12-LEAD (to [place])  -     Echo Saline Bubble? No; Future  -     CBC Auto Differential; Future  -     Comprehensive Metabolic Panel; Future  -     TSH; Future    Other orders  -     fluticasone propionate (FLONASE) 50 mcg/actuation nasal spray; 2 sprays (100 mcg total) by Each Nostril route once daily.  -     amLODIPine (NORVASC) 10 MG tablet; Take 1 tablet (10 mg total) by mouth once daily.  -     apixaban (ELIQUIS) 5 mg Tab; Take 1 tablet (5 mg total) by mouth 2 (two) times daily.  -     apixaban (ELIQUIS) 5 mg Tab; Take 1 tablet (5 mg total) by mouth 2 (two) times daily.     Pt has asymptomatic new onset atrial fibrillation.  (Note pt had transient atrial flutter in the first few days after MVR which  converted spontaneously)    Will anticoagulate with Eliquis and consult electrophysiology.  Discontinue ASA    Repeat echocardiogram    Will double the dose of amlodipine for HBP    Follow up in about 4 weeks (around 4/17/2024).

## 2024-04-08 ENCOUNTER — HOSPITAL ENCOUNTER (OUTPATIENT)
Dept: CARDIOLOGY | Facility: HOSPITAL | Age: 74
Discharge: HOME | End: 2024-04-08
Attending: INTERNAL MEDICINE
Payer: MEDICARE

## 2024-04-08 VITALS — WEIGHT: 173 LBS | BODY MASS INDEX: 24.77 KG/M2 | HEIGHT: 70 IN

## 2024-04-08 DIAGNOSIS — Z86.79 H/O BACTERIAL ENDOCARDITIS: ICD-10-CM

## 2024-04-08 DIAGNOSIS — I48.91 NEW ONSET ATRIAL FIBRILLATION: ICD-10-CM

## 2024-04-08 DIAGNOSIS — N18.30 STAGE 3 CHRONIC KIDNEY DISEASE, UNSPECIFIED WHETHER STAGE 3A OR 3B CKD: ICD-10-CM

## 2024-04-08 DIAGNOSIS — I10 PRIMARY HYPERTENSION: Chronic | ICD-10-CM

## 2024-04-08 DIAGNOSIS — Z95.2 S/P MVR (MITRAL VALVE REPLACEMENT): ICD-10-CM

## 2024-04-08 DIAGNOSIS — Z95.1 S/P CABG X 1: ICD-10-CM

## 2024-04-08 DIAGNOSIS — D64.9 NORMOCYTIC ANEMIA: ICD-10-CM

## 2024-04-08 DIAGNOSIS — I49.3 PVC'S (PREMATURE VENTRICULAR CONTRACTIONS): ICD-10-CM

## 2024-04-08 PROCEDURE — 93306 TTE W/DOPPLER COMPLETE: CPT | Mod: 26,HCNC,, | Performed by: INTERNAL MEDICINE

## 2024-04-08 PROCEDURE — 93306 TTE W/DOPPLER COMPLETE: CPT | Mod: HCNC,PN

## 2024-04-10 ENCOUNTER — TELEPHONE (OUTPATIENT)
Dept: CARDIOLOGY | Facility: CLINIC | Age: 74
End: 2024-04-10
Payer: MEDICARE

## 2024-04-10 LAB
ASCENDING AORTA: 2.96 CM
AV INDEX (PROSTH): 0.65
AV MEAN GRADIENT: 7 MMHG
AV PEAK GRADIENT: 12 MMHG
AV REGURGITATION PRESSURE HALF TIME: 326.16 MS
AV VALVE AREA BY VELOCITY RATIO: 2.92 CM²
AV VALVE AREA: 2.27 CM²
AV VELOCITY RATIO: 0.84
BSA FOR ECHO PROCEDURE: 1.97 M2
CV ECHO LV RWT: 0.51 CM
DOP CALC AO PEAK VEL: 1.71 M/S
DOP CALC AO VTI: 39.8 CM
DOP CALC LVOT AREA: 3.5 CM2
DOP CALC LVOT DIAMETER: 2.11 CM
DOP CALC LVOT PEAK VEL: 1.43 M/S
DOP CALC LVOT STROKE VOLUME: 90.52 CM3
DOP CALC MV VTI: 65.8 CM
DOP CALCLVOT PEAK VEL VTI: 25.9 CM
ECHO LV POSTERIOR WALL: 1.5 CM (ref 0.6–1.1)
EJECTION FRACTION: 60 %
FRACTIONAL SHORTENING: 28 % (ref 28–44)
INTERVENTRICULAR SEPTUM: 1.5 CM (ref 0.6–1.1)
IVC DIAMETER: 1.75 CM
LA MAJOR: 5.47 CM
LA MINOR: 5.56 CM
LA WIDTH: 4.2 CM
LEFT ATRIUM SIZE: 5.57 CM
LEFT ATRIUM VOLUME INDEX MOD: 36.1 ML/M2
LEFT ATRIUM VOLUME INDEX: 55.9 ML/M2
LEFT ATRIUM VOLUME MOD: 70.78 CM3
LEFT ATRIUM VOLUME: 109.66 CM3
LEFT INTERNAL DIMENSION IN SYSTOLE: 4.26 CM (ref 2.1–4)
LEFT VENTRICLE DIASTOLIC VOLUME INDEX: 89.03 ML/M2
LEFT VENTRICLE DIASTOLIC VOLUME: 174.5 ML
LEFT VENTRICLE MASS INDEX: 214 G/M2
LEFT VENTRICLE SYSTOLIC VOLUME INDEX: 41.5 ML/M2
LEFT VENTRICLE SYSTOLIC VOLUME: 81.4 ML
LEFT VENTRICULAR INTERNAL DIMENSION IN DIASTOLE: 5.92 CM (ref 3.5–6)
LEFT VENTRICULAR MASS: 418.48 G
LVOT MG: 3.73 MMHG
LVOT MV: 0.91 CM/S
MV MEAN GRADIENT: 7 MMHG
MV PEAK GRADIENT: 17 MMHG
MV VALVE AREA BY CONTINUITY EQUATION: 1.38 CM2
OHS LV EJECTION FRACTION SIMPSONS BIPLANE MOD: 54 %
PISA AR MAX VEL: 4.56 M/S
PISA TR MAX VEL: 3.3 M/S
PV PEAK GRADIENT: 5 MMHG
PV PEAK VELOCITY: 1.16 M/S
RA MAJOR: 4.13 CM
RA PRESSURE ESTIMATED: 8 MMHG
RA WIDTH: 3.74 CM
RIGHT VENTRICLE DIASTOLIC MID DIMENSION: 2.5 CM
RIGHT VENTRICULAR END-DIASTOLIC DIMENSION: 4.4 CM
RV TB RVSP: 11 MMHG
RV TISSUE DOPPLER FREE WALL SYSTOLIC VELOCITY 1 (APICAL 4 CHAMBER VIEW): 9.48 CM/S
SINUS: 3.02 CM
STJ: 2.81 CM
TDI LATERAL: 0.11 M/S
TDI SEPTAL: 0.07 M/S
TDI: 0.09 M/S
TR MAX PG: 44 MMHG
TRICUSPID ANNULAR PLANE SYSTOLIC EXCURSION: 1.33 CM
TV REST PULMONARY ARTERY PRESSURE: 52 MMHG
Z-SCORE OF LEFT VENTRICULAR DIMENSION IN END DIASTOLE: 0.54
Z-SCORE OF LEFT VENTRICULAR DIMENSION IN END SYSTOLE: 1.67

## 2024-04-10 NOTE — TELEPHONE ENCOUNTER
I left a message on voicemail:  echocardiogram shows no significant change compared with echocardiogram done in January of 2023 (except for the new onset atrial fibrillation.)  Pt has an appt to see Dr Kwong, electrophysiologist

## 2024-04-15 ENCOUNTER — TELEPHONE (OUTPATIENT)
Dept: ELECTROPHYSIOLOGY | Facility: CLINIC | Age: 74
End: 2024-04-15
Payer: MEDICARE

## 2024-04-15 DIAGNOSIS — Z95.2 S/P MVR (MITRAL VALVE REPLACEMENT): Primary | ICD-10-CM

## 2024-04-16 ENCOUNTER — OFFICE VISIT (OUTPATIENT)
Dept: ELECTROPHYSIOLOGY | Facility: CLINIC | Age: 74
End: 2024-04-16
Payer: MEDICARE

## 2024-04-16 ENCOUNTER — HOSPITAL ENCOUNTER (OUTPATIENT)
Dept: CARDIOLOGY | Facility: CLINIC | Age: 74
Discharge: HOME | End: 2024-04-16
Payer: MEDICARE

## 2024-04-16 VITALS
HEIGHT: 70 IN | DIASTOLIC BLOOD PRESSURE: 74 MMHG | SYSTOLIC BLOOD PRESSURE: 161 MMHG | BODY MASS INDEX: 24.34 KG/M2 | WEIGHT: 170 LBS | HEART RATE: 84 BPM

## 2024-04-16 DIAGNOSIS — Z95.2 S/P MVR (MITRAL VALVE REPLACEMENT): ICD-10-CM

## 2024-04-16 DIAGNOSIS — N18.31 STAGE 3A CHRONIC KIDNEY DISEASE: ICD-10-CM

## 2024-04-16 DIAGNOSIS — I48.91 NEW ONSET ATRIAL FIBRILLATION: Primary | ICD-10-CM

## 2024-04-16 DIAGNOSIS — I10 PRIMARY HYPERTENSION: Chronic | ICD-10-CM

## 2024-04-16 DIAGNOSIS — Z95.1 S/P CABG X 1: ICD-10-CM

## 2024-04-16 PROBLEM — Z86.79 H/O ATRIAL FLUTTER: Status: RESOLVED | Noted: 2018-07-09 | Resolved: 2024-04-16

## 2024-04-16 LAB
OHS QRS DURATION: 96 MS
OHS QTC CALCULATION: 470 MS

## 2024-04-16 PROCEDURE — 1159F MED LIST DOCD IN RCRD: CPT | Mod: HCNC,CPTII,S$GLB, | Performed by: INTERNAL MEDICINE

## 2024-04-16 PROCEDURE — 1126F AMNT PAIN NOTED NONE PRSNT: CPT | Mod: HCNC,CPTII,S$GLB, | Performed by: INTERNAL MEDICINE

## 2024-04-16 PROCEDURE — 3078F DIAST BP <80 MM HG: CPT | Mod: HCNC,CPTII,S$GLB, | Performed by: INTERNAL MEDICINE

## 2024-04-16 PROCEDURE — 93005 ELECTROCARDIOGRAM TRACING: CPT | Mod: HCNC,S$GLB,, | Performed by: INTERNAL MEDICINE

## 2024-04-16 PROCEDURE — 99999 PR PBB SHADOW E&M-EST. PATIENT-LVL IV: CPT | Mod: PBBFAC,HCNC,, | Performed by: INTERNAL MEDICINE

## 2024-04-16 PROCEDURE — 3077F SYST BP >= 140 MM HG: CPT | Mod: HCNC,CPTII,S$GLB, | Performed by: INTERNAL MEDICINE

## 2024-04-16 PROCEDURE — 93010 ELECTROCARDIOGRAM REPORT: CPT | Mod: HCNC,S$GLB,, | Performed by: INTERNAL MEDICINE

## 2024-04-16 PROCEDURE — 3288F FALL RISK ASSESSMENT DOCD: CPT | Mod: HCNC,CPTII,S$GLB, | Performed by: INTERNAL MEDICINE

## 2024-04-16 PROCEDURE — 1160F RVW MEDS BY RX/DR IN RCRD: CPT | Mod: HCNC,CPTII,S$GLB, | Performed by: INTERNAL MEDICINE

## 2024-04-16 PROCEDURE — 3044F HG A1C LEVEL LT 7.0%: CPT | Mod: HCNC,CPTII,S$GLB, | Performed by: INTERNAL MEDICINE

## 2024-04-16 PROCEDURE — 3008F BODY MASS INDEX DOCD: CPT | Mod: HCNC,CPTII,S$GLB, | Performed by: INTERNAL MEDICINE

## 2024-04-16 PROCEDURE — 99204 OFFICE O/P NEW MOD 45 MIN: CPT | Mod: HCNC,S$GLB,, | Performed by: INTERNAL MEDICINE

## 2024-04-16 PROCEDURE — 1101F PT FALLS ASSESS-DOCD LE1/YR: CPT | Mod: HCNC,CPTII,S$GLB, | Performed by: INTERNAL MEDICINE

## 2024-04-16 NOTE — PROGRESS NOTES
Subjective   Patient ID:  Rodolfo Messina is a 73 y.o. male who presents for evaluation of Atrial Fibrillation    Referring Cardiologist: Leonel Lockett MD  Primary Care Physician: Deric Claudio MD    HPI  I had the pleasure of seeing Mr. Messina today in our electrophysiology clinic in consultation for his atrial arrhythmia. 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 Go complicated by post-op atrial fibrillation (only observed during hospitalization), hypertension, CKD stage 3a and recently diagnosed atrial fibrillation. He recently saw Dr. Lockett for cardiac follow-up and was diagnosed with AF. Eliquis was initiated and he is referred for further care. ECG from 6/2023 noted sinus rhythm.    ECHO 4/2024: normal LVEF, severe LA dilation, no MR    I reviewed available ECGs in EPIC which show sinus rhythm, at times with PVCs, and some slow ventricular rates when in AF in May/June of 2018.    My interpretation of today's in-clinic ECG is AF with an average ventricular rate of 84 bpm with PVCs.    Review of Systems   Constitutional: Negative for fever and malaise/fatigue.   HENT:  Negative for congestion and sore throat.    Eyes:  Negative for blurred vision and visual disturbance.   Cardiovascular:  Negative for chest pain, dyspnea on exertion, irregular heartbeat, near-syncope, palpitations and syncope.   Respiratory:  Negative for cough and shortness of breath.    Hematologic/Lymphatic: Negative for bleeding problem. Does not bruise/bleed easily.   Skin: Negative.    Musculoskeletal: Negative.    Gastrointestinal:  Negative for bloating, abdominal pain, hematochezia and melena.   Neurological:  Negative for focal weakness and weakness.   Psychiatric/Behavioral: Negative.            Objective     Physical Exam  Vitals reviewed.   Constitutional:       General: He is not in acute distress.     Appearance: He is  well-developed. He is not diaphoretic.   HENT:      Head: Normocephalic and atraumatic.   Eyes:      General:         Right eye: No discharge.         Left eye: No discharge.      Conjunctiva/sclera: Conjunctivae normal.   Cardiovascular:      Rate and Rhythm: Normal rate. Rhythm irregularly irregular.      Heart sounds: No murmur heard.     No friction rub. No gallop.   Pulmonary:      Effort: Pulmonary effort is normal. No respiratory distress.      Breath sounds: Normal breath sounds. No wheezing or rales.   Abdominal:      General: Bowel sounds are normal. There is no distension.      Palpations: Abdomen is soft.      Tenderness: There is no abdominal tenderness.   Musculoskeletal:      Cervical back: Neck supple.   Skin:     General: Skin is warm and dry.   Neurological:      Mental Status: He is alert and oriented to person, place, and time.   Psychiatric:         Behavior: Behavior normal.         Thought Content: Thought content normal.         Judgment: Judgment normal.            Assessment and Plan     1. New onset atrial fibrillation    2. S/P CABG x 1    3. S/P MVR (mitral valve replacement)    4. Primary hypertension    5. Stage 3a chronic kidney disease        Plan:    In summary, Mr. Messina is a pleasant 73 year-old man acute MR from bacterial endocarditis in 2018 s/p MVR also with LIMA-LAD bypass by Dr. Go complicated by post-op atrial fibrillation (only observed during hospitalization), hypertension, CKD stage 3a and recently diagnosed atrial fibrillation. I had a long discussion with the patient about the pathophysiology and risks of atrial fibrillation and its basic pathophysiology, including its health implications and treatment options. Specifically, I addressed the need for CVA (stroke) prophylaxis with aspirin versus oral anticoagulation (warfarin vs DOACs, discussed bleeding risks, and need to come to the ER for any head trauma for CT scanning even if asymptomatic). [id number] score is 3  and oral anticoagulation is indicated. He is on eliquis. I also discussed the goal to reduce symptomatic arrhythmic episodes by pharmacologic and/or procedural methods and utilizing a rhythm versus a rate control strategy.  Reasonable to restore sinus rhythm since this is a new diagnosis. Unclear if he would tolerate anti-arrhythmic drugs (consider multaq).    Thank you for allowing me to participate in the care of this patient. Please do not hesitate to call me with any questions or concerns.    Ronny Kwong MD, PhD  Cardiac Electrophysiology

## 2024-04-18 DIAGNOSIS — Z95.1 S/P CABG X 1: ICD-10-CM

## 2024-04-18 DIAGNOSIS — D69.6 THROMBOCYTOPENIA, UNSPECIFIED: ICD-10-CM

## 2024-04-18 DIAGNOSIS — Z86.79 H/O BACTERIAL ENDOCARDITIS: ICD-10-CM

## 2024-04-18 DIAGNOSIS — Z95.2 S/P MVR (MITRAL VALVE REPLACEMENT): ICD-10-CM

## 2024-04-18 DIAGNOSIS — N18.31 STAGE 3A CHRONIC KIDNEY DISEASE: ICD-10-CM

## 2024-04-18 DIAGNOSIS — I10 PRIMARY HYPERTENSION: Chronic | ICD-10-CM

## 2024-04-18 DIAGNOSIS — I48.91 NEW ONSET ATRIAL FIBRILLATION: Primary | ICD-10-CM

## 2024-04-18 DIAGNOSIS — I49.3 PVC'S (PREMATURE VENTRICULAR CONTRACTIONS): ICD-10-CM

## 2024-04-19 ENCOUNTER — TELEPHONE (OUTPATIENT)
Dept: ELECTROPHYSIOLOGY | Facility: CLINIC | Age: 74
End: 2024-04-19
Payer: MEDICARE

## 2024-04-22 DIAGNOSIS — I49.3 PVC'S (PREMATURE VENTRICULAR CONTRACTIONS): ICD-10-CM

## 2024-04-22 DIAGNOSIS — Z95.2 S/P MVR (MITRAL VALVE REPLACEMENT): ICD-10-CM

## 2024-04-22 DIAGNOSIS — I48.91 NEW ONSET ATRIAL FIBRILLATION: Primary | ICD-10-CM

## 2024-04-24 ENCOUNTER — OFFICE VISIT (OUTPATIENT)
Dept: CARDIOLOGY | Facility: CLINIC | Age: 74
End: 2024-04-24
Payer: MEDICARE

## 2024-04-24 VITALS
HEIGHT: 70 IN | OXYGEN SATURATION: 98 % | DIASTOLIC BLOOD PRESSURE: 70 MMHG | HEART RATE: 74 BPM | SYSTOLIC BLOOD PRESSURE: 122 MMHG | WEIGHT: 173.81 LBS | BODY MASS INDEX: 24.88 KG/M2

## 2024-04-24 DIAGNOSIS — G25.0 ESSENTIAL TREMOR: ICD-10-CM

## 2024-04-24 DIAGNOSIS — I10 PRIMARY HYPERTENSION: Chronic | ICD-10-CM

## 2024-04-24 DIAGNOSIS — I48.91 NEW ONSET ATRIAL FIBRILLATION: Primary | ICD-10-CM

## 2024-04-24 DIAGNOSIS — D69.6 THROMBOCYTOPENIA, UNSPECIFIED: ICD-10-CM

## 2024-04-24 DIAGNOSIS — Z86.79 H/O BACTERIAL ENDOCARDITIS: ICD-10-CM

## 2024-04-24 DIAGNOSIS — Z95.1 S/P CABG X 1: ICD-10-CM

## 2024-04-24 DIAGNOSIS — N18.30 STAGE 3 CHRONIC KIDNEY DISEASE, UNSPECIFIED WHETHER STAGE 3A OR 3B CKD: ICD-10-CM

## 2024-04-24 DIAGNOSIS — I49.3 PVC'S (PREMATURE VENTRICULAR CONTRACTIONS): ICD-10-CM

## 2024-04-24 DIAGNOSIS — Z95.2 S/P MVR (MITRAL VALVE REPLACEMENT): ICD-10-CM

## 2024-04-24 PROCEDURE — 3044F HG A1C LEVEL LT 7.0%: CPT | Mod: HCNC,CPTII,S$GLB, | Performed by: INTERNAL MEDICINE

## 2024-04-24 PROCEDURE — 1160F RVW MEDS BY RX/DR IN RCRD: CPT | Mod: HCNC,CPTII,S$GLB, | Performed by: INTERNAL MEDICINE

## 2024-04-24 PROCEDURE — 1159F MED LIST DOCD IN RCRD: CPT | Mod: HCNC,CPTII,S$GLB, | Performed by: INTERNAL MEDICINE

## 2024-04-24 PROCEDURE — 3288F FALL RISK ASSESSMENT DOCD: CPT | Mod: HCNC,CPTII,S$GLB, | Performed by: INTERNAL MEDICINE

## 2024-04-24 PROCEDURE — 99214 OFFICE O/P EST MOD 30 MIN: CPT | Mod: HCNC,S$GLB,, | Performed by: INTERNAL MEDICINE

## 2024-04-24 PROCEDURE — 3008F BODY MASS INDEX DOCD: CPT | Mod: HCNC,CPTII,S$GLB, | Performed by: INTERNAL MEDICINE

## 2024-04-24 PROCEDURE — 1126F AMNT PAIN NOTED NONE PRSNT: CPT | Mod: HCNC,CPTII,S$GLB, | Performed by: INTERNAL MEDICINE

## 2024-04-24 PROCEDURE — 3078F DIAST BP <80 MM HG: CPT | Mod: HCNC,CPTII,S$GLB, | Performed by: INTERNAL MEDICINE

## 2024-04-24 PROCEDURE — 1101F PT FALLS ASSESS-DOCD LE1/YR: CPT | Mod: HCNC,CPTII,S$GLB, | Performed by: INTERNAL MEDICINE

## 2024-04-24 PROCEDURE — 99999 PR PBB SHADOW E&M-EST. PATIENT-LVL III: CPT | Mod: PBBFAC,HCNC,, | Performed by: INTERNAL MEDICINE

## 2024-04-24 PROCEDURE — 3074F SYST BP LT 130 MM HG: CPT | Mod: HCNC,CPTII,S$GLB, | Performed by: INTERNAL MEDICINE

## 2024-04-24 NOTE — PROGRESS NOTES
Subjective:      Patient ID: Rodolfo Messina is a 73 y.o. male.    Chief Complaint: No chief complaint on file.    HPI:  Pt felt very dizzy on amlodipine 10 mg daily and Eliquis 5 mg bid associated with weakness.    BP at home shows range of 102/58 up to 136/56.    Mows the lawn without difficulty    The dizziness has abated.    Review of Systems   Cardiovascular:  Negative for chest pain, claudication, dyspnea on exertion, irregular heartbeat, leg swelling, near-syncope, orthopnea, palpitations and syncope.        Past Medical History:   Diagnosis Date    ANNA (acute kidney injury) 2/22/2018    Anemia     Anxiety     BPH (benign prostatic hyperplasia)     Coarse tremors     Coronary artery disease of native artery of native heart with stable angina pectoris 5/21/2018    Diverticulosis     Encounter for blood transfusion     Endocarditis     Mechanical heart valve present     New onset atrial fibrillation 3/20/2024    Non Hodgkin's lymphoma 8/2/2018    Urinary retention         Past Surgical History:   Procedure Laterality Date    ANTERIOR CERVICAL DISCECTOMY N/A 10/16/2019    Procedure: DISCECTOMY, SPINE, CERVICAL, ANTERIOR C6/7 ACDF;  Surgeon: Jay Reyes MD;  Location: [facility] OR [address] [place] [address];  Service: Neurosurgery;  Laterality: N/A;    APPENDECTOMY      COLONOSCOPY      COLONOSCOPY N/A 2/1/2018    Procedure: COLONOSCOPY;  Surgeon: Richar Payan MD;  Location: [facility] (4TH FLR);  Service: Endoscopy;  Laterality: N/A;  PM prep    CORONARY ARTERY BYPASS GRAFT (CABG) N/A 5/25/2018    Procedure: AORTOCORONARY BYPASS-CABG;  Surgeon: Marvin Go MD;  [facility];  Service: Cardiovascular;  Laterality: N/A;    EYE SURGERY Right     cataract extraction    FINGER SURGERY      FRACTURE SURGERY Right     index finger    MITRAL VALVE REPLACEMENT N/A 5/25/2018    Procedure: Mitral Valve Replacement;  Surgeon: Marvin Go MD;  [facility];  Service: Cardiovascular;  Laterality: N/A;     TONSILLECTOMY         Family History   Problem Relation Name Age of Onset    Hypertension Mother      Stroke Mother      Heart disease Mother          CABG    Coronary artery disease Mother      Coronary artery disease Father      Heart disease Father          CABG    No Known Problems Sister x1     No Known Problems Brother x1     Melanoma Neg Hx         Social History     Socioeconomic History    Marital status:    Occupational History     Employer: [employer]   Tobacco Use    Smoking status: Never    Smokeless tobacco: Never   Substance and Sexual Activity    Alcohol use: Yes     Comment: social    Drug use: No    Sexual activity: Not Currently     Partners: Female     Social Determinants of Health     Financial Resource Strain: Low Risk  (5/30/2023)    Overall Financial Resource Strain (CARDIA)     Difficulty of Paying Living Expenses: Not hard at all   Food Insecurity: No Food Insecurity (5/30/2023)    Hunger Vital Sign     Worried About Running Out of Food in the Last Year: Never true     Ran Out of Food in the Last Year: Never true   Transportation Needs: No Transportation Needs (5/30/2023)    PRAPARE - Transportation     Lack of Transportation (Medical): No     Lack of Transportation (Non-Medical): No   Physical Activity: Patient Declined (5/30/2023)    Exercise Vital Sign     Days of Exercise per Week: Patient declined     Minutes of Exercise per Session: Patient declined   Stress: No Stress Concern Present (5/30/2023)    [employer] - Occupational Stress Questionnaire     Feeling of Stress : Not at all   Social Connections: Unknown (5/30/2023)    Social Connection and Isolation Panel [NHANES]     Frequency of Communication with Friends and Family: Patient declined     Frequency of Social Gatherings with Friends and Family: Patient declined     Attends Jehovah's witness Services: Patient declined     Active Member of Clubs or Organizations: Patient declined     Attends Club or  Organization Meetings: Patient declined     Marital Status:    Housing Stability: Low Risk  (5/30/2023)    Housing Stability Vital Sign     Unable to Pay for Housing in the Last Year: No     Number of Places Lived in the Last Year: 1     Unstable Housing in the Last Year: No       Current Outpatient Medications on File Prior to Visit   Medication Sig Dispense Refill    acetaminophen (TYLENOL) 650 MG TbSR Take 650 mg by mouth every 8 (eight) hours.      amLODIPine (NORVASC) 10 MG tablet Take 1 tablet (10 mg total) by mouth once daily. 90 tablet 3    apixaban (ELIQUIS) 5 mg Tab Take 1 tablet (5 mg total) by mouth 2 (two) times daily. 60 tablet 11    apixaban (ELIQUIS) 5 mg Tab Take 1 tablet (5 mg total) by mouth 2 (two) times daily. 180 tablet 3    ascorbic acid, vitamin C, (VITAMIN C) 1000 MG tablet Take 1,000 mg by mouth every other day.      atorvastatin (LIPITOR) 40 MG tablet GIVE 1 TABLET VIA G TUBE DAILY 90 tablet 3    cetirizine (ZYRTEC) 10 MG tablet Take 10 mg by mouth once daily.      fluticasone propionate (FLONASE) 50 mcg/actuation nasal spray 2 sprays (100 mcg total) by Each Nostril route once daily. 18.2 mL 11    lactose-reduced food (ENSURE HIGH PROTEIN-MUSCLE ORAL) Take by mouth once daily.       magnesium 250 mg Tab Take by mouth once daily.      melatonin 10 mg Tab Take by mouth as needed.       mirtazapine (REMERON) 30 MG tablet TAKE 1 TABLET EVERY EVENING 90 tablet 1    multivit-min/FA/lycopen/lutein (ADULTS 50 PLUS ORAL) Take by mouth once daily.       pantoprazole (PROTONIX) 40 MG tablet TAKE 1 TABLET EVERY DAY 90 tablet 3    SAW PALMETTO ORAL Take 450 mg by mouth once daily.      tamsulosin (FLOMAX) 0.4 mg Cap Take 2 capsules (0.8 mg total) by mouth once daily. (Patient taking differently: Take 0.4 mg by mouth once daily.) 180 capsule 0    ALPRAZolam (XANAX) 0.25 MG tablet Take 1 tablet (0.25 mg total) by mouth 3 (three) times daily as needed for Anxiety. 30 tablet 1    EPINEPHrine  "(EPIPEN) 0.3 mg/0.3 mL AtIn Inject 0.3 mLs (0.3 mg total) into the muscle once. for 1 dose 2 each 1     No current facility-administered medications on file prior to visit.       Review of patient's allergies indicates:   Allergen Reactions    Metoprolol Diarrhea    Shellfish containing products     Augmentin [amoxicillin-pot clavulanate]      Patient felt that it raised BP and requested to have it added as intolerance. Tolerated unasyn and ampicillin.    Ciprofloxacin     Flagyl [metronidazole]     Lisinopril Other (See Comments)     cough    Mysoline [primidone]     Omnicef [cefdinir]      Objective:     Vitals:    04/24/24 1400   BP: 122/70   BP Location: Left arm   Patient Position: Sitting   BP Method: Large (Automatic)   Pulse: 74   SpO2: 98%   Weight: 78.8 kg (173 lb 13.3 oz)   Height: 5' 10" (1.778 m)        Physical Exam  Constitutional:       General: He is not in acute distress.     Appearance: He is well-developed. He is not diaphoretic.   Eyes:      General: No scleral icterus.  Neck:      Vascular: No carotid bruit or JVD.   Cardiovascular:      Rate and Rhythm: Rhythm irregularly irregular.      Heart sounds: Normal heart sounds. No murmur heard.     No friction rub. No gallop.   Pulmonary:      Effort: Pulmonary effort is normal. No respiratory distress.      Breath sounds: Normal breath sounds.   Musculoskeletal:      Right lower leg: No edema.      Left lower leg: No edema.   Skin:     General: Skin is warm and dry.   Neurological:      Mental Status: He is alert and oriented to person, place, and time.   Psychiatric:         Behavior: Behavior normal.         Thought Content: Thought content normal.         Judgment: Judgment normal.        Hospital Outpatient Visit on 04/16/2024   Component Date Value Ref Range Status    QRS Duration 04/16/2024 96  ms Final    [facility] QTC Calculation 04/16/2024 470  ms Final   Hospital Outpatient Visit on 04/08/2024   Component Date Value Ref Range Status    BSA " 04/08/2024 1.97  m2 Final    Barajas's Biplane MOD Ejection Fra* 04/08/2024 54  % Final    LVOT stroke volume 04/08/2024 90.52  cm3 Final    LVIDd 04/08/2024 5.92  3.5 - 6.0 cm Final    LV Systolic Volume 04/08/2024 81.40  mL Final    LV Systolic Volume Index 04/08/2024 41.5  mL/m2 Final    LVIDs 04/08/2024 4.26 (A)  2.1 - 4.0 cm Final    LV Diastolic Volume 04/08/2024 174.50  mL Final    LV Diastolic Volume Index 04/08/2024 89.03  mL/m2 Final    IVS 04/08/2024 1.5 (A)  0.6 - 1.1 cm Final    LVOT diameter 04/08/2024 2.11  cm Final    LVOT area 04/08/2024 3.5  cm2 Final    FS 04/08/2024 28  28 - 44 % Final    Left Ventricle Relative Wall Thick* 04/08/2024 0.51  cm Final    Posterior Wall 04/08/2024 1.5 (A)  0.6 - 1.1 cm Final    LV mass 04/08/2024 418.48  g Final    LV Mass Index 04/08/2024 214  g/m2 Final    TDI LATERAL 04/08/2024 0.11  m/s Final    TDI SEPTAL 04/08/2024 0.07  m/s Final    TR Max Julito 04/08/2024 3.3  m/s Final    LVOT peak julito 04/08/2024 1.43  m/s Final    Left Ventricular Outflow Tract Ranjana* 04/08/2024 0.91  cm/s Final    Left Ventricular Outflow Tract Ranjana* 04/08/2024 3.73  mmHg Final    RVDD 04/08/2024 4.40  cm Final    RV S' 04/08/2024 9.48  cm/s Final    TAPSE 04/08/2024 1.33  cm Final    LA size 04/08/2024 5.57  cm Final    Left Atrium Minor Axis 04/08/2024 5.56  cm Final    Left Atrium Major Axis 04/08/2024 5.47  cm Final    LA volume (mod) 04/08/2024 70.78  cm3 Final    LA Volume Index (Mod) 04/08/2024 36.1  mL/m2 Final    RA Major [place] 04/08/2024 4.13  cm Final    RA Width 04/08/2024 3.74  cm Final    AV regurgitation pressure 1/2 time 04/08/2024 326.953223172463080  ms Final    AR Max Julito 04/08/2024 4.56  m/s Final    AV mean gradient 04/08/2024 7  mmHg Final    AV peak gradient 04/08/2024 12  mmHg Final    Ao peak julito 04/08/2024 1.71  m/s Final    Ao VTI 04/08/2024 39.80  cm Final    LVOT peak VTI 04/08/2024 25.90  cm Final    AV valve area 04/08/2024 2.27  cm² Final    AV Velocity Ratio  04/08/2024 0.84   Final    AV index (prosthetic) 04/08/2024 0.65   Final    DARCIE by Velocity Ratio 04/08/2024 2.92  cm² Final    MV mean gradient 04/08/2024 7  mmHg Final    MV peak gradient 04/08/2024 17  mmHg Final    MV valve area by continuity eq 04/08/2024 1.38  cm2 Final    MV VTI 04/08/2024 65.8  cm Final    Triscuspid Valve Regurgitation Pea* 04/08/2024 44  mmHg Final    PV PEAK VELOCITY 04/08/2024 1.16  m/s Final    PV peak gradient 04/08/2024 5  mmHg Final    Sinus 04/08/2024 3.02  cm Final    STJ 04/08/2024 2.81  cm Final    Ascending aorta 04/08/2024 2.96  cm Final    IVC diameter 04/08/2024 1.75  cm Final    Mean e' 04/08/2024 0.09  m/s Final    ZLVIDS 04/08/2024 1.67   Final    ZLVIDD 04/08/2024 0.54   Final    LA Volume Index 04/08/2024 55.9  mL/m2 Final    LA volume 04/08/2024 109.66  cm3 Final    LA WIDTH 04/08/2024 4.2  cm Final    TV resting pulmonary artery pressu* 04/08/2024 52  mmHg Final    RV TB RVSP 04/08/2024 11  mmHg Final    Est. RA pres 04/08/2024 8  mmHg Final    EF 04/08/2024 60  % Final    RV-pacheco mid d 04/08/2024 2.5  cm Final   Office Visit on 03/20/2024   Component Date Value Ref Range Status    QRS Duration 03/20/2024 98  ms Final    [facility]HS QTC Calculation 03/20/2024 447  ms Final   Lab Visit on 03/12/2024   Component Date Value Ref Range Status    Sodium 03/12/2024 144  136 - 145 mmol/L Final    Potassium 03/12/2024 4.3  3.5 - 5.1 mmol/L Final    Chloride 03/12/2024 110  95 - 110 mmol/L Final    CO2 03/12/2024 26  23 - 29 mmol/L Final    Glucose 03/12/2024 93  70 - 110 mg/dL Final    BUN 03/12/2024 22 (H)  2 - 20 mg/dL Final    Creatinine 03/12/2024 1.39  0.50 - 1.40 mg/dL Final    Calcium 03/12/2024 9.5  8.7 - 10.5 mg/dL Final    Total Protein 03/12/2024 7.4  6.0 - 8.4 g/dL Final    Albumin 03/12/2024 4.3  3.5 - 5.2 g/dL Final    Total Bilirubin 03/12/2024 1.2 (H)  0.1 - 1.0 mg/dL Final    Alkaline Phosphatase 03/12/2024 63  38 - 126 U/L Final    AST 03/12/2024 24  15 - 46 U/L Final     ALT 03/12/2024 21  10 - 44 U/L Final    Anion Gap 03/12/2024 8  8 - 16 mmol/L Final    eGFR 03/12/2024 53.5 (A)  >60 mL/min/1.73 m^2 Final    WBC 03/12/2024 4.22  3.90 - 12.70 K/uL Final    RBC 03/12/2024 3.79 (L)  4.60 - 6.20 M/uL Final    Hemoglobin 03/12/2024 12.5 (L)  14.0 - 18.0 g/dL Final    Hematocrit 03/12/2024 37.9 (L)  40.0 - 54.0 % Final    MCV 03/12/2024 100 (H)  82 - 98 fL Final    MCH 03/12/2024 33.0 (H)  27.0 - 31.0 pg Final    MCHC 03/12/2024 33.0  32.0 - 36.0 g/dL Final    RDW 03/12/2024 12.2  11.5 - 14.5 % Final    Platelets 03/12/2024 126 (L)  150 - 450 K/uL Final    MPV 03/12/2024 11.1  9.2 - 12.9 fL Final    Immature Granulocytes 03/12/2024 0.2  0.0 - 0.5 % Final    Gran # (ANC) 03/12/2024 2.4  1.8 - 7.7 K/uL Final    Immature Grans (Abs) 03/12/2024 0.01  0.00 - 0.04 K/uL Final    Lymph # 03/12/2024 1.2  1.0 - 4.8 K/uL Final    Mono # 03/12/2024 0.4  0.3 - 1.0 K/uL Final    Eos # 03/12/2024 0.2  0.0 - 0.5 K/uL Final    Baso # 03/12/2024 0.03  0.00 - 0.20 K/uL Final    nRBC 03/12/2024 0  0 /100 WBC Final    Gran % 03/12/2024 56.7  38.0 - 73.0 % Final    Lymph % 03/12/2024 27.7  18.0 - 48.0 % Final    Mono % 03/12/2024 9.2  4.0 - 15.0 % Final    Eosinophil % 03/12/2024 5.5  0.0 - 8.0 % Final    Basophil % 03/12/2024 0.7  0.0 - 1.9 % Final    Differential Method 03/12/2024 Automated   Final    Cholesterol 03/12/2024 115 (L)  120 - 199 mg/dL Final    Triglycerides 03/12/2024 68  30 - 150 mg/dL Final    HDL 03/12/2024 52  40 - 75 mg/dL Final    LDL Cholesterol 03/12/2024 49.4 (L)  63.0 - 159.0 mg/dL Final    HDL/Cholesterol Ratio 03/12/2024 45.2  20.0 - 50.0 % Final    Total Cholesterol/HDL Ratio 03/12/2024 2.2  2.0 - 5.0 Final    Non-HDL Cholesterol 03/12/2024 63  mg/dL Final    TSH 03/12/2024 2.030  0.400 - 4.000 uIU/mL Final    Hemoglobin A1C 03/12/2024 5.2  4.0 - 5.6 % Final    Estimated Avg Glucose 03/12/2024 103  68 - 131 mg/dL Final    PSA, Screen 03/12/2024 1.3  0.00 - 4.00 ng/mL Final  "  (Accession #: [id number]  Transthoracic echo (TTE) complete    Height:  5' 10" (1.778 m)   Weight:  78.8 kg (173 lb 11.6 oz)   Blood Pressure:  Not recorded    Date of Study:  4/8/24   Ordering Provider:  Yousuf Lockett MD   Clinical Indications:  New onset atrial fibrillation [I48.91 (ICD-10-CM)], S/P MVR (mitral valve replacement) [Z95.2 (ICD-10-CM)], S/P CABG x 1 [Z95.1 (ICD-10-CM)], PVC's (premature ventricular contractions) [I49.3 (ICD-10-CM)], Primary hypertension [I10 (ICD-10-CM)], H/O bacterial endocarditis [Z86.79 (ICD-10-CM)], Stage 3 chronic kidney disease, unspecified whether stage 3a or 3b CKD [N18.30 (ICD-10-CM)], Normocytic anemia [D64.9 (ICD-10-CM)]       [facility]  Performing Staff   Cardiology:  Yousuf Lockett MD    Tech:  Veronica Werner        Reason for Exam  Priority: Routine  Dx: New onset atrial fibrillation [I48.91 (ICD-10-CM)]; S/P MVR (mitral valve replacement) [Z95.2 (ICD-10-CM)]; S/P CABG x 1 [Z95.1 (ICD-10-CM)]; PVC's (premature ventricular contractions) [I49.3 (ICD-10-CM)]; Primary hypertension [I10 (ICD-10-CM)]; H/O bacterial endocarditis [Z86.79 (ICD-10-CM)]; Stage 3 chronic kidney disease, unspecified whether stage 3a or 3b CKD [N18.30 (ICD-10-CM)]; Normocytic anemia [D64.9 (ICD-10-CM)]     View Images Vital Vitrea     Show images for Echo Saline Bubble? No  Summary  Show Result Comparison       Left Ventricle: The left ventricle is normal in size. Mildly increased wall thickness. There is normal systolic function. Ejection fraction by visual approximation is 60%. Unable to assess diastolic function due to atrial fibrillation.    Right Ventricle: Normal right ventricular cavity size. Wall thickness is normal. Right ventricle wall motion  is normal. Systolic function is normal.    Left Atrium: Left atrium is severely dilated.    Aortic Valve: There is mild to moderate aortic regurgitation with a centrally directed jet.    Mitral Valve: There is a bioprosthetic " valve in the mitral position. There is mild stenosis. The mean pressure gradient across the mitral valve is 7 mmHg at a heart rate of  bpm.    Tricuspid Valve: There is moderate regurgitation.    Pulmonic Valve: There is no stenosis. There is mild regurgitation.    IVC/SVC: Intermediate venous pressure at 8 mmHg.    The estimated pulmonary artery systolic pressure is elevated at 52 mm Hg     Vitals   Authorization Time:  1:18 PM              DATE OF PROCEDURE:  05/25/2018     PREOPERATIVE DIAGNOSES:  1.  Acute mitral endocarditis.  2.  Severe mitral regurgitation.  3.  Mild to moderate aortic insufficiency.  4.  Coronary artery disease.  5.  Low ejection fraction.  6.  Heart failure, acute on chronic, systolic and diastolic heart failure.  7.  Status post intraaortic balloon pump.     POSTOPERATIVE DIAGNOSES:  1.  Acute mitral endocarditis.  2.  Severe mitral regurgitation.  3.  Mild to moderate aortic insufficiency.  4.  Coronary artery disease.  5.  Low ejection fraction.  6.  Heart failure, acute on chronic, systolic and diastolic heart failure.  7.  Status post intraaortic balloon pump.     OPERATIONS:  1.  Mitral valve replacement.  2.  CABG x1 (LIMA-LAD).  3.  Takedown of the left internal mammary artery, skeletonized technique.     STAFF SURGEON:  Marvin Go M.D.     FIRST ASSISTANT:  Christoph Saldaña M.D. (RES)     SECOND ASSISTANT:  Lauren Giang.     ANESTHESIA:  GETA.     ESTIMATED BLOOD LOSS:  200 mL.     KEY FINDINGS OF THE OPERATION:  1.  Extreme destruction of the mitral valve.  Both anterior and posterior   leaflets with large vegetations, requiring almost near complete excision of the   anterior and the posterior leaflets with maintenance of subvalvular apparatus.  2.  Good quality of left internal mammary artery with excellent flow.  3.  Good hemostasis.  4.  Aortic insufficiency was mild as well as the mitral insufficiency and hence   no intervention was done.     INDICATION OF OPERATION:  This  is a 67-year-old debilitated patient who was   admitted with acute endocarditis in the hospital.  The patient was wheelchair   bound.  The patient over the last ____ completed his antibiotic course and was   improved significantly and his nutritional status and was maintained on   guideline directed heart failure medications and now a decision was made to   proceed for replacement or repair of his mitral valve as well as other valves.    Risks, benefits, and alternatives were discussed.  The patient was brought   electively to the hospital and placed with an intraaortic balloon pump to help   in unloading of the heart, especially with the severe mitral regurgitation,   fluid overload, to ensure diuresis, and improvement of end organ function.  Once   that was done, an informed consent was obtained for the operation.     DESCRIPTION OF OPERATION:  The patient was brought to the Operating Room and   placed in a supine position.  After induction of anesthesia, area was prepped   and draped in the usual sterile fashion.  Previously placed midline incision was   made, which was carried all the way down to the sternum.  A median sternotomy   was then performed.  A chest retractor was then placed and the pericardium was   then opened up.  A Rultract retractor was placed on the left side and the left   internal mammary artery was then taken down in a skeletonized fashion from the   origin all the way down to the bifurcation.  Systemic heparinization was carried   out.  Once ACT greater than 450 was obtained, the distal portion of the left   internal mammary artery was clipped and ligated.  It was prepared with   papaverine to remove any vasospasm.  Cannulation stitches were placed.  Arterial   cannula was in the ascending aorta.  Venous cannula was placed in the SVC and   the IVC to obtain a bicaval cannulation strategy.  Circumferential dissection of   the SVC and the IVC was done and placement of caval tapes was down.   Once that   was done, the patient was placed on full cardiopulmonary bypass.  Antegrade   cardioplegia catheter was placed in the ascending aorta.  Cross clamp was   applied and a cardiac standstill was obtained.  Caval tapes were cinched down.    The right atrium was opened up.  Direct visualization of the coronary sinus was   done and cannulation of the cardioplegia catheter was done to deliver   cardioplegia.  Once that was achieved, the septum was opened.  The interatrial   septum was incised and the mitral retractor was placed for exposure.  The   incision was extended into the dome of the left atrium.  Once that was achieved,   visualization of the anterior and the posterior leaflet of the mitral valve was   carried out.  Extensive destruction and vegetation both of the leaflets was   present.  The anterior leaflet was completely excised.  However, we maintained   the subvalvular apparatus of both the anterior and the posterior leaflet.  In   the posterior leaflets, P1 and P3 were retained, but P2 had to be completely   excised because of large vegetation that was present.  Multiple pledgeted   everting stitches were placed around the annulus and tacking the subvalvular   structures with the sutures.  After sizing physiologically, a 27 bioprosthetic   valve was found to be a good fit.  A 27 Medtronic valve was brought to the field   and prepared.  The needles were passed through the sewing ring and cut and   passed off the field.  The sutures were tied down and cut.  Once that was done,   every 20 minutes, we were instilling cardioplegia for cardiac protection.  The   interatrial septum was closed using 4-0 Prolene pledgeted stitch in a continuous   running fashion.  The right atrium was then closed in a similar manner with a   4-0 Prolene stitch.  Attention was then directed towards the mid portion of the   LAD.  The LAD was dissected out and opened up and in the meantime, the left   internal mammary artery  was spatulated and prepared for anastomosis using a 7-0   Prolene stitch.  A continuous running anastomosis was performed.  Once that was   done, a dose of hot shot was given over 3 minutes, followed by removal of cross   clamp.  The root vent was used for de-airing.  Instant cardiac activity was   noted.  No intracardiac air was noted.  Ventilation was resumed.  Electrolytes   were found to be within normal limits.  Gradually, the patient was weaned off   from cardiopulmonary bypass.  The patient  from cardiopulmonary bypass   without any issues.  Test dose of protamine was given followed by full dose of   protamine to reverse the effects of systemic heparin.  Midway dose, all the   various catheters and cannulas were removed.  No intracardiac air was noted.  No   paravalvular leak was noted.  No mitral regurgitation was noted.  No MS was   noted.  Aortic insufficiency was still mild as well as mild tricuspid   regurgitation.  No wall motion abnormality was noted.  At this stage, multiple   pacing wires were placed on the ventricular surfaces and brought through   separate skin incision.  Four drainage tubes were placed, two in the mediastinum   and one in each pleural space, and connected to Pleurovac for drainage   purposes.  Sternum was approximated with #6 stainless steel wires.  Skin and   subcutaneous tissues were closed in multiple layers.  A sterile dressing was   applied.  Terminal count of needles, sponges, and instrument was found to be   correct.     MEDICARE ATTESTATION:  I was present for all parts of the operation and Dr. Christoph Saldaña acted as my first assistant.        HAY  dd: 05/25/2018 16:11:59 (CDT)  td: 05/25/2018 18:01:38 (CDT)  Doc ID   #[id number]  Job ID #[id number]               Assessment:     1. New onset atrial fibrillation    2. S/P MVR (mitral valve replacement)    3. S/P CABG x 1    4. PVC's (premature ventricular contractions)    5. Primary hypertension    6. H/O bacterial  endocarditis    7. Stage 3 chronic kidney disease, unspecified whether stage 3a or 3b CKD    8. Thrombocytopenia, unspecified    9. Essential tremor      Plan:   Diagnoses and all orders for this visit:    New onset atrial fibrillation    S/P MVR (mitral valve replacement)    S/P CABG x 1    PVC's (premature ventricular contractions)    Primary hypertension    H/O bacterial endocarditis    Stage 3 chronic kidney disease, unspecified whether stage 3a or 3b CKD    Thrombocytopenia, unspecified    Essential tremor     Pt is doing OK on current regimen.    HBP is controlled    Echo findings are stable    The recent dizziness is a little concerning for orthostatic hypotension but the dizziness has spontaneously abated event with continuing the higher dose of amlodipine 10 mg daily    Same meds    Dr Kwong consult appreciated.  Agree with plans for cardioversion and possible Multaq    Follow up in about 3 months (around 7/24/2024). With labs already ordered

## 2024-04-29 ENCOUNTER — LAB VISIT (OUTPATIENT)
Dept: LAB | Facility: HOSPITAL | Age: 74
End: 2024-04-29
Attending: INTERNAL MEDICINE
Payer: MEDICARE

## 2024-04-29 DIAGNOSIS — I49.3 PVC'S (PREMATURE VENTRICULAR CONTRACTIONS): ICD-10-CM

## 2024-04-29 DIAGNOSIS — Z95.2 S/P MVR (MITRAL VALVE REPLACEMENT): ICD-10-CM

## 2024-04-29 DIAGNOSIS — I48.91 NEW ONSET ATRIAL FIBRILLATION: ICD-10-CM

## 2024-04-29 LAB
ANION GAP SERPL CALC-SCNC: 9 MMOL/L (ref 8–16)
APTT PPP: 30.8 SEC (ref 21–32)
CALCIUM SERPL-MCNC: 9.3 MG/DL (ref 8.7–10.5)
CHLORIDE SERPL-SCNC: 109 MMOL/L (ref 95–110)
CO2 SERPL-SCNC: 26 MMOL/L (ref 23–29)
CREAT SERPL-MCNC: 1.23 MG/DL (ref 0.5–1.4)
ERYTHROCYTE [DISTWIDTH] IN BLOOD BY AUTOMATED COUNT: 12.4 % (ref 11.5–14.5)
EST. GFR  (NO RACE VARIABLE): >60 ML/MIN/1.73 M^2
GLUCOSE SERPL-MCNC: 114 MG/DL (ref 70–110)
HCT VFR BLD AUTO: 34 % (ref 40–54)
HGB BLD-MCNC: 11.3 G/DL (ref 14–18)
INR PPP: 1.1 (ref 0.8–1.2)
MCH RBC QN AUTO: 33.2 PG (ref 27–31)
MCHC RBC AUTO-ENTMCNC: 33.2 G/DL (ref 32–36)
MCV RBC AUTO: 100 FL (ref 82–98)
PLATELET # BLD AUTO: 132 K/UL (ref 150–450)
PMV BLD AUTO: 10.8 FL (ref 9.2–12.9)
POTASSIUM SERPL-SCNC: 4.9 MMOL/L (ref 3.5–5.1)
PROTHROMBIN TIME: 11.4 SEC (ref 9–12.5)
RBC # BLD AUTO: 3.4 M/UL (ref 4.6–6.2)
SODIUM SERPL-SCNC: 144 MMOL/L (ref 136–145)
UUN UR-MCNC: 21 MG/DL (ref 2–20)
WBC # BLD AUTO: 4.08 K/UL (ref 3.9–12.7)

## 2024-04-29 PROCEDURE — 85027 COMPLETE CBC AUTOMATED: CPT | Mod: HCNC,PN | Performed by: INTERNAL MEDICINE

## 2024-04-29 PROCEDURE — 85610 PROTHROMBIN TIME: CPT | Mod: HCNC,PN | Performed by: INTERNAL MEDICINE

## 2024-04-29 PROCEDURE — 85730 THROMBOPLASTIN TIME PARTIAL: CPT | Mod: HCNC,PN | Performed by: INTERNAL MEDICINE

## 2024-04-29 PROCEDURE — 80048 BASIC METABOLIC PNL TOTAL CA: CPT | Mod: HCNC,PN | Performed by: INTERNAL MEDICINE

## 2024-04-29 PROCEDURE — 36415 COLL VENOUS BLD VENIPUNCTURE: CPT | Mod: HCNC,PN | Performed by: INTERNAL MEDICINE

## 2024-05-06 ENCOUNTER — TELEPHONE (OUTPATIENT)
Dept: ELECTROPHYSIOLOGY | Facility: CLINIC | Age: 74
End: 2024-05-06
Payer: MEDICARE

## 2024-05-06 NOTE — TELEPHONE ENCOUNTER
----- Message from Heydi Hathaway MA sent at 5/6/2024 10:26 AM CDT -----  Regarding: RE: Returning call    ----- Message -----  From: Ngozi Vernon  Sent: 5/6/2024  10:21 AM CDT  To: [facility] Staff  Subject: Returning call                                   Patient returning call. Please call back @ [phone]. Thank you Ngozi    Spoke to patient's wife    CONFIRMED procedure arrival time of 8AM for 10 AM procedure    Reiterated instructions including:  -Directions to check in desk  -NPO after midnight night prior to procedure  -Confirmed compliance of maintaining Eliquis as prescribed  -Pre-procedure LABS were reviewed and no alerts noted  -Confirmed absence of implanted device/stimulator   -Confirmed no fever, cough, or shortness of breath in the past 30 days  -Confirmed no redness, rash, irritation, or yeast infection to groin area.   -Reviewed current visitor policy    Patient verbalized understanding of above and appreciated the call.

## 2024-05-07 ENCOUNTER — HOSPITAL ENCOUNTER (OUTPATIENT)
Facility: HOSPITAL | Age: 74
Discharge: HOME | End: 2024-05-07
Attending: INTERNAL MEDICINE | Admitting: INTERNAL MEDICINE
Payer: MEDICARE

## 2024-05-07 ENCOUNTER — HOSPITAL ENCOUNTER (OUTPATIENT)
Dept: CARDIOLOGY | Facility: HOSPITAL | Age: 74
Discharge: HOME | End: 2024-05-07
Attending: INTERNAL MEDICINE
Payer: MEDICARE

## 2024-05-07 ENCOUNTER — ANESTHESIA (OUTPATIENT)
Dept: MEDSURG UNIT | Facility: HOSPITAL | Age: 74
End: 2024-05-07
Payer: MEDICARE

## 2024-05-07 ENCOUNTER — ANESTHESIA EVENT (OUTPATIENT)
Dept: MEDSURG UNIT | Facility: HOSPITAL | Age: 74
End: 2024-05-07
Payer: MEDICARE

## 2024-05-07 VITALS
HEIGHT: 70 IN | DIASTOLIC BLOOD PRESSURE: 70 MMHG | WEIGHT: 173 LBS | SYSTOLIC BLOOD PRESSURE: 122 MMHG | BODY MASS INDEX: 24.77 KG/M2

## 2024-05-07 VITALS
SYSTOLIC BLOOD PRESSURE: 126 MMHG | DIASTOLIC BLOOD PRESSURE: 69 MMHG | RESPIRATION RATE: 17 BRPM | OXYGEN SATURATION: 98 % | HEART RATE: 72 BPM

## 2024-05-07 DIAGNOSIS — Z95.2 S/P MVR (MITRAL VALVE REPLACEMENT): ICD-10-CM

## 2024-05-07 DIAGNOSIS — I48.91 ATRIAL FIBRILLATION: ICD-10-CM

## 2024-05-07 DIAGNOSIS — I48.91 NEW ONSET ATRIAL FIBRILLATION: ICD-10-CM

## 2024-05-07 DIAGNOSIS — I49.3 PVC'S (PREMATURE VENTRICULAR CONTRACTIONS): ICD-10-CM

## 2024-05-07 LAB
ASCENDING AORTA: 2.9 CM
BSA FOR ECHO PROCEDURE: 1.97 M2
HR MV ECHO: 90 BPM
LAA PV: 50 CM/S
MV MEAN GRADIENT: 6 MMHG
MV PEAK GRADIENT: 14 MMHG
OHS QRS DURATION: 92 MS
OHS QRS DURATION: 94 MS
OHS QTC CALCULATION: 440 MS
OHS QTC CALCULATION: 474 MS
SINUS: 3.3 CM
STJ: 2.9 CM

## 2024-05-07 PROCEDURE — 25000003 PHARM REV CODE 250: Mod: HCNC | Performed by: NURSE ANESTHETIST, CERTIFIED REGISTERED

## 2024-05-07 PROCEDURE — 92960 CARDIOVERSION ELECTRIC EXT: CPT | Mod: HCNC | Performed by: INTERNAL MEDICINE

## 2024-05-07 PROCEDURE — 93325 DOPPLER ECHO COLOR FLOW MAPG: CPT | Mod: HCNC

## 2024-05-07 PROCEDURE — 93325 DOPPLER ECHO COLOR FLOW MAPG: CPT | Mod: 26,HCNC,, | Performed by: INTERNAL MEDICINE

## 2024-05-07 PROCEDURE — 37000008 HC ANESTHESIA 1ST 15 MINUTES: Mod: HCNC | Performed by: INTERNAL MEDICINE

## 2024-05-07 PROCEDURE — D9220A PRA ANESTHESIA: Mod: HCNC,ANES,, | Performed by: ANESTHESIOLOGY

## 2024-05-07 PROCEDURE — 37000009 HC ANESTHESIA EA ADD 15 MINS: Mod: HCNC | Performed by: INTERNAL MEDICINE

## 2024-05-07 PROCEDURE — 25000003 PHARM REV CODE 250: Mod: HCNC

## 2024-05-07 PROCEDURE — 92960 CARDIOVERSION ELECTRIC EXT: CPT | Mod: HCNC,,, | Performed by: INTERNAL MEDICINE

## 2024-05-07 PROCEDURE — 63600175 PHARM REV CODE 636 W HCPCS: Mod: HCNC | Performed by: NURSE ANESTHETIST, CERTIFIED REGISTERED

## 2024-05-07 PROCEDURE — 93005 ELECTROCARDIOGRAM TRACING: CPT | Mod: HCNC

## 2024-05-07 PROCEDURE — 93010 ELECTROCARDIOGRAM REPORT: CPT | Mod: HCNC,,, | Performed by: INTERNAL MEDICINE

## 2024-05-07 PROCEDURE — 93320 DOPPLER ECHO COMPLETE: CPT | Mod: 26,HCNC,, | Performed by: INTERNAL MEDICINE

## 2024-05-07 PROCEDURE — 93005 ELECTROCARDIOGRAM TRACING: CPT | Mod: HCNC,59

## 2024-05-07 PROCEDURE — 93312 ECHO TRANSESOPHAGEAL: CPT | Mod: 26,HCNC,, | Performed by: INTERNAL MEDICINE

## 2024-05-07 PROCEDURE — D9220A PRA ANESTHESIA: Mod: HCNC,CRNA,, | Performed by: NURSE ANESTHETIST, CERTIFIED REGISTERED

## 2024-05-07 RX ORDER — PROPOFOL 10 MG/ML
VIAL (ML) INTRAVENOUS
Status: DISCONTINUED | OUTPATIENT
Start: 2024-05-07 | End: 2024-05-07

## 2024-05-07 RX ORDER — LIDOCAINE HYDROCHLORIDE 20 MG/ML
INJECTION INTRAVENOUS
Status: DISCONTINUED | OUTPATIENT
Start: 2024-05-07 | End: 2024-05-07

## 2024-05-07 RX ORDER — DIPHENHYDRAMINE HYDROCHLORIDE 50 MG/ML
25 INJECTION INTRAMUSCULAR; INTRAVENOUS EVERY 6 HOURS PRN
Status: DISCONTINUED | OUTPATIENT
Start: 2024-05-07 | End: 2024-05-07 | Stop reason: HOSPADM

## 2024-05-07 RX ORDER — FENTANYL CITRATE 50 UG/ML
25 INJECTION, SOLUTION INTRAMUSCULAR; INTRAVENOUS EVERY 5 MIN PRN
Status: DISCONTINUED | OUTPATIENT
Start: 2024-05-07 | End: 2024-05-07 | Stop reason: HOSPADM

## 2024-05-07 RX ORDER — ONDANSETRON HYDROCHLORIDE 2 MG/ML
4 INJECTION, SOLUTION INTRAVENOUS ONCE AS NEEDED
Status: DISCONTINUED | OUTPATIENT
Start: 2024-05-07 | End: 2024-05-07 | Stop reason: HOSPADM

## 2024-05-07 RX ORDER — DEXMEDETOMIDINE HYDROCHLORIDE 100 UG/ML
INJECTION, SOLUTION INTRAVENOUS
Status: DISCONTINUED | OUTPATIENT
Start: 2024-05-07 | End: 2024-05-07

## 2024-05-07 RX ORDER — HYDROMORPHONE HYDROCHLORIDE 1 MG/ML
0.2 INJECTION, SOLUTION INTRAMUSCULAR; INTRAVENOUS; SUBCUTANEOUS EVERY 5 MIN PRN
Status: DISCONTINUED | OUTPATIENT
Start: 2024-05-07 | End: 2024-05-07 | Stop reason: HOSPADM

## 2024-05-07 RX ADMIN — PROPOFOL 175 MCG/KG/MIN: 10 INJECTION, EMULSION INTRAVENOUS at 10:05

## 2024-05-07 RX ADMIN — APIXABAN 5 MG: 5 TABLET, FILM COATED ORAL at 10:05

## 2024-05-07 RX ADMIN — GLYCOPYRROLATE 0.2 MG: 0.2 INJECTION, SOLUTION INTRAMUSCULAR; INTRAVENOUS at 10:05

## 2024-05-07 RX ADMIN — LIDOCAINE HYDROCHLORIDE 160 MG: 20 INJECTION INTRAVENOUS at 10:05

## 2024-05-07 RX ADMIN — DEXMEDETOMIDINE 12 MCG: 100 INJECTION, SOLUTION, CONCENTRATE INTRAVENOUS at 10:05

## 2024-05-07 RX ADMIN — SODIUM CHLORIDE: 0.9 INJECTION, SOLUTION INTRAVENOUS at 10:05

## 2024-05-07 RX ADMIN — PROPOFOL 50 MG: 10 INJECTION, EMULSION INTRAVENOUS at 10:05

## 2024-05-07 NOTE — NURSING
Patient and pt spouse both verbalized an understanding of d/c instructions.  IV d/c'd with cath tip intact.  VSS.  Denies pain or SOB.  Swallows without difficulty.

## 2024-05-07 NOTE — DISCHARGE SUMMARY
"  [facility]  Cardiology  Discharge Summary      Patient Name: Rodolfo Messina  MRN: [id number]  Admission Date: 5/7/2024  Hospital Length of Stay: 0 days  Discharge Date and Time:  05/07/2024 11:50 AM  Attending Physician: Ronny Kwong MD    Discharging Provider: Livier Grande PA-C  Primary Care Physician: Deric Claudio MD    HPI:   Last OV with Dr. Kwong on 4/16/24.   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 Go complicated by post-op atrial fibrillation (only observed during hospitalization), hypertension, CKD stage 3a and recently diagnosed atrial fibrillation. He recently saw Dr. Lockett for cardiac follow-up and was diagnosed with AF. Eliquis was initiated and he was referred to EP for further care.      Today, here for SOWMYA/DCCV. No acute complaints. Wife at bedside. Morning dose of eliquis given by RN.     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 This has since healed and he has had no trouble since. He also has a history of a GI bleed years ago requiring "15 units of blood" but no recent bleeding noted.      Procedure(s) (LRB):  Cardioversion or Defibrillation (N/A)  Transesophageal echo (SOWMYA) intra-procedure log documentation (N/A)       Indwelling Lines/Drains at time of discharge:  none      Hospital Course:  Patient underwent SOWMYA without evidence of COSME thrombus. Proceeded with DCCV, converting from AF to sinus rhythm. Patient tolerated the procedure without any acute complications. Post-DCCV ECG revealed SR at 78 bpm. Plan to continue all home medications including eliquis; start multaq 400mg BID. Instructed to return in 1 week for follow up ECG and in 4 weeks for clinic follow up with Dr. Kwong or Marybeth Arce NP.   Patient was assessed at bedside prior to discharge, they reported feeling well and denied " chest discomfort, shortness of breath, palpitations, lightheadedness, or any other acute symptoms. Discharge instructions were discussed with patient and all questions were answered. Patient was discharged home in stable condition.      Goals of Care Treatment Preferences:  Code Status: Full Code      Significant Diagnostic Studies: SOWMYA - final report pending - prelim no COSME thrombus, proceeded with DCCV      Pending Diagnostic Studies:       Procedure Component Value Units Date/Time    EKG 12-lead [[id number]]     Order Status: Sent Lab Status: No result             There are no hospital problems to display for this patient.    No new Assessment & Plan notes have been filed under this hospital service since the last note was generated.  Service: Cardiology      Discharged Condition: stable    Disposition: Home or Self Care    Follow Up:   Follow-up Information       Ronny Kwong MD. Schedule an appointment as soon as possible for a visit in 1 month(s).    Specialties: Electrophysiology, Cardiology  Why: cardioversion follow-up  Contact information:  [facility] [address]  [phone]                           Patient Instructions:   No discharge procedures on file.  Medications:  Reconciled Home Medications:      Medication List        START taking these medications      dronedarone 400 mg Tab  Commonly known as: MULTAQ  Take 1 tablet (400 mg total) by mouth 2 (two) times daily with meals.            CONTINUE taking these medications      acetaminophen 650 MG Tbsr  Commonly known as: TYLENOL  Take 650 mg by mouth every 8 (eight) hours.     ADULTS 50 PLUS ORAL  Take by mouth once daily.     amLODIPine 10 MG tablet  Commonly known as: NORVASC  Take 1 tablet (10 mg total) by mouth once daily.     * apixaban 5 mg Tab  Commonly known as: ELIQUIS  Take 1 tablet (5 mg total) by mouth 2 (two) times daily.     * apixaban 5 mg Tab  Commonly known as: ELIQUIS  Take 1 tablet (5 mg total) by mouth 2 (two)  times daily.     ascorbic acid (vitamin C) 1000 MG tablet  Commonly known as: VITAMIN C  Take 1,000 mg by mouth every other day.     atorvastatin 40 MG tablet  Commonly known as: LIPITOR  GIVE 1 TABLET VIA G TUBE DAILY     cetirizine 10 MG tablet  Commonly known as: ZYRTEC  Take 10 mg by mouth once daily.     ENSURE HIGH PROTEIN-MUSCLE ORAL  Take by mouth once daily.     EPINEPHrine 0.3 mg/0.3 mL Atin  Commonly known as: EPIPEN  Inject 0.3 mLs (0.3 mg total) into the muscle once. for 1 dose     fluticasone propionate 50 mcg/actuation nasal spray  Commonly known as: FLONASE  2 sprays (100 mcg total) by Each Nostril route once daily.     magnesium 250 mg Tab  Take by mouth once daily.     melatonin 10 mg Tab  Take by mouth as needed.     mirtazapine 30 MG tablet  Commonly known as: REMERON  TAKE 1 TABLET EVERY EVENING     pantoprazole 40 MG tablet  Commonly known as: PROTONIX  TAKE 1 TABLET EVERY DAY     SAW PALMETTO ORAL  Take 450 mg by mouth once daily.     tamsulosin 0.4 mg Cap  Commonly known as: FLOMAX  Take 2 capsules (0.8 mg total) by mouth once daily.           * This list has 2 medication(s) that are the same as other medications prescribed for you. Read the directions carefully, and ask your doctor or other care provider to review them with you.                  Time spent on the discharge of patient: 35 minutes    Livier Grande PA-C  Cardiology  [facility]

## 2024-05-07 NOTE — ANESTHESIA POSTPROCEDURE EVALUATION
Anesthesia Post Evaluation    Patient: Rodolfo Messina    Procedure(s) Performed: Procedure(s) (LRB):  Cardioversion or Defibrillation (N/A)  Transesophageal echo (SOWMYA) intra-procedure log documentation (N/A)    Final Anesthesia Type: general      Level of consciousness: awake and alert  Post-procedure vital signs: reviewed and stable  Pain control: Pain has been treated.  Airway patency: patent    PONV status: Absent or treated.  Anesthetic complications: no      Cardiovascular status: hemodynamically stable  Respiratory status: unassisted  Hydration status: euvolemic                Vitals Value Taken Time   /69 05/07/24 1131   Temp  05/07/24 1818   Pulse 72 05/07/24 1145   Resp 17 05/07/24 1145   SpO2 98 % 05/07/24 1145         No case tracking events are documented in the log.      Pain/Siva Score: Siva Score: 10 (5/7/2024 11:45 AM)

## 2024-05-07 NOTE — DISCHARGE INSTRUCTIONS
New Medications:  - START multaq (dronedarone) 400mg twice daily WITH meals.  - Continue to take all other home medications as listed on your medication list after you are discharged.    Diet  -You may resume oral intake after you are discharged, as long you have no swallowing difficulties.    Because you have received sedation for this procedure:  -Limit activity for the remainder of the day.  -Do not smoke for at least 6 hours and until you are fully awake and alert.  -Do not drink alcoholic beverage for 24 hours.  -Do not drive for 24 hours.  -Defer important decision making until the following day.     Go to the Emergency Department if you develop:   -Bleeding  -Weakness or numbness  -Visual, gait or speech disturbance  -New chest pain, palpitations, shortness of breath, rapid heart beat, or fainting  -Fever    Follow up:  -EKG in 1 week.  -Dr. Kwong or Marybeth Arce NP in 1 month. Call or message the office to schedule.    Any need to reschedule or cancel procedures, or any questions regarding your procedures should be addressed directly with the Arrhythmia Department Nurses at the following phone number: [phone].

## 2024-05-07 NOTE — TRANSFER OF CARE
Anesthesia Transfer of Care Note    Patient: Rodolfo Messina    Procedure(s) Performed: Procedure(s) (LRB):  Cardioversion or Defibrillation (N/A)  Transesophageal echo (SOWMYA) intra-procedure log documentation (N/A)    Patient location: [facility]    Anesthesia Type: general and MAC    Transport from OR: Transported from OR on 2-3 L/min O2 by NC with adequate spontaneous ventilation    Post pain: adequate analgesia    Post assessment: no apparent anesthetic complications and tolerated procedure well    Post vital signs: stable    Level of consciousness: awake and alert    Nausea/Vomiting: no nausea/vomiting    Complications: none    Transfer of care protocol was followed      Last vitals: There were no vitals taken for this visit.

## 2024-05-07 NOTE — HOSPITAL COURSE
Patient underwent SOWMYA without evidence of COSME thrombus. Proceeded with DCCV, converting from AF to sinus rhythm. Patient tolerated the procedure without any acute complications. Post-DCCV ECG revealed SR at 78 bpm. Plan to continue all home medications including eliquis; start multaq 400mg BID. Instructed to return in 1 week for follow up ECG and in 4 weeks for clinic follow up with Dr. Kwong or Marybeth Arce NP.   Patient was assessed at bedside prior to discharge, they reported feeling well and denied chest discomfort, shortness of breath, palpitations, lightheadedness, or any other acute symptoms. Discharge instructions were discussed with patient and all questions were answered. Patient was discharged home in stable condition.

## 2024-05-07 NOTE — H&P
"[facility]  Cardiology  SOWMYA/DCCV History & Physical      Rodolfo Messina  YOB: 1950  Medical Record Number:  [id number]   Attending Physician:  Ronny Kwong MD   Date of Admission: 5/7/2024        Hospital Day:  0  Current Principal Problem:  Atrial fibrillation      History     Cc: SOWMYA/DCCV for AF    HPI  Last OV with Dr. Kwong on 4/16/24.   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 Go complicated by post-op atrial fibrillation (only observed during hospitalization), hypertension, CKD stage 3a and recently diagnosed atrial fibrillation. He recently saw Dr. Lockett for cardiac follow-up and was diagnosed with AF. Eliquis was initiated and he was referred to EP for further care.     Today, here for SOWMYA/DCCV. No acute complaints. Wife at bedside. Morning dose of eliquis given by RN.    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 This has since healed and he has had no trouble since. He also has a history of a GI bleed years ago requiring "15 units of blood" but no recent bleeding noted.    Rate/rhythm control: none  Anticoagulant/antiplatelets: eliquis  ECG: AF, 65 bpm  Platelet count: 132  INR: 1.1    History of stroke:  no  Dysphagia or odynophagia:  no (see HPI)  Liver Disease, esophageal disease, or known varices:  no (see HPI)  Upper GI Bleeding:  no (see HPI)  Snoring:  no   Sleep Apnea:  no  Prior neck surgery or radiation:  no  History of anesthetic difficulties:  no  Family history of anesthetic difficulties:  no  Last oral intake: last pm   Able to move neck in all directions:  yes  GLP-1 Use: no        Medications - Outpatient  Prior to Admission medications    Medication Sig Start Date End Date Taking? Authorizing Provider   acetaminophen (TYLENOL) 650 MG TbSR Take 650 mg by mouth every 8 " (eight) hours.    Provider, Historical   amLODIPine (NORVASC) 10 MG tablet Take 1 tablet (10 mg total) by mouth once daily. 3/20/24 3/20/25  Yousuf Lockett MD   apixaban (ELIQUIS) 5 mg Tab Take 1 tablet (5 mg total) by mouth 2 (two) times daily. 3/20/24   Yousuf Lockett MD   apixaban (ELIQUIS) 5 mg Tab Take 1 tablet (5 mg total) by mouth 2 (two) times daily. 3/20/24   Yousuf Lockett MD   ascorbic acid, vitamin C, (VITAMIN C) 1000 MG tablet Take 1,000 mg by mouth every other day.    Provider, Historical   atorvastatin (LIPITOR) 40 MG tablet GIVE 1 TABLET VIA G TUBE DAILY 6/5/23   Deric Claudio MD   cetirizine (ZYRTEC) 10 MG tablet Take 10 mg by mouth once daily.    Provider, Historical   EPINEPHrine (EPIPEN) 0.3 mg/0.3 mL AtIn Inject 0.3 mLs (0.3 mg total) into the muscle once. for 1 dose 7/6/23 3/20/24  Deric Claudio MD   fluticasone propionate (FLONASE) 50 mcg/actuation nasal spray 2 sprays (100 mcg total) by Each Nostril route once daily. 3/20/24   Yousuf Lockett MD   lactose-reduced food (ENSURE HIGH PROTEIN-MUSCLE ORAL) Take by mouth once daily.     Provider, Historical   magnesium 250 mg Tab Take by mouth once daily.    Provider, Historical   melatonin 10 mg Tab Take by mouth as needed.     Provider, Historical   mirtazapine (REMERON) 30 MG tablet TAKE 1 TABLET EVERY EVENING 11/6/23   Deric Claudio MD   multivit-min/FA/lycopen/lutein (ADULTS 50 PLUS ORAL) Take by mouth once daily.     Provider, Historical   pantoprazole (PROTONIX) 40 MG tablet TAKE 1 TABLET EVERY DAY 6/21/23   Deric Claudio MD   SAW PALMETTO ORAL Take 450 mg by mouth once daily.    Provider, Historical   tamsulosin (FLOMAX) 0.4 mg Cap Take 2 capsules (0.8 mg total) by mouth once daily.  Patient taking differently: Take 0.4 mg by mouth once daily. 3/28/19   Maria C Arceo PA-C         Medications - Current  Scheduled Meds:      Continuous Infusions:  PRN Meds:.      Allergies  Review  of patient's allergies indicates:   Allergen Reactions    Metoprolol Diarrhea    Shellfish containing products     Augmentin [amoxicillin-pot clavulanate]      Patient felt that it raised BP and requested to have it added as intolerance. Tolerated unasyn and ampicillin.    Ciprofloxacin     Flagyl [metronidazole]     Lisinopril Other (See Comments)     cough    Mysoline [primidone]     Omnicef [cefdinir]          Past Medical History  Past Medical History:   Diagnosis Date    ANNA (acute kidney injury) 2/22/2018    Anemia     Anxiety     BPH (benign prostatic hyperplasia)     Coarse tremors     Coronary artery disease of native artery of native heart with stable angina pectoris 5/21/2018    Diverticulosis     Encounter for blood transfusion     Endocarditis     Mechanical heart valve present     New onset atrial fibrillation 3/20/2024    Non Hodgkin's lymphoma 8/2/2018    Urinary retention          Past Surgical History  Past Surgical History:   Procedure Laterality Date    ANTERIOR CERVICAL DISCECTOMY N/A 10/16/2019    Procedure: DISCECTOMY, SPINE, CERVICAL, ANTERIOR C6/7 ACDF;  Surgeon: Jay Reyes MD;  Location: [facility] OR [address] [place] [address];  Service: Neurosurgery;  Laterality: N/A;    APPENDECTOMY      COLONOSCOPY      COLONOSCOPY N/A 2/1/2018    Procedure: COLONOSCOPY;  Surgeon: Richar Payan MD;  Location: [facility] ENDO (4TH FLR);  Service: Endoscopy;  Laterality: N/A;  PM prep    CORONARY ARTERY BYPASS GRAFT (CABG) N/A 5/25/2018    Procedure: AORTOCORONARY BYPASS-CABG;  Surgeon: Marvin Go MD;  [facility];  Service: Cardiovascular;  Laterality: N/A;    EYE SURGERY Right     cataract extraction    FINGER SURGERY      FRACTURE SURGERY Right     index finger    MITRAL VALVE REPLACEMENT N/A 5/25/2018    Procedure: Mitral Valve Replacement;  Surgeon: Marvin Go MD;  Location: [facility] OR [address] [place] [address];  Service: Cardiovascular;  Laterality: N/A;    TONSILLECTOMY           Social History  Social History      Socioeconomic History    Marital status:    Occupational History     Employer: [employer]   Tobacco Use    Smoking status: Never    Smokeless tobacco: Never   Substance and Sexual Activity    Alcohol use: Yes     Comment: social    Drug use: No    Sexual activity: Not Currently     Partners: Female     Social Determinants of Health     Financial Resource Strain: Low Risk  (5/30/2023)    Overall Financial Resource Strain (CARDIA)     Difficulty of Paying Living Expenses: Not hard at all   Food Insecurity: No Food Insecurity (5/30/2023)    Hunger Vital Sign     Worried About Running Out of Food in the Last Year: Never true     Ran Out of Food in the Last Year: Never true   Transportation Needs: No Transportation Needs (5/30/2023)    PRAPARE - Transportation     Lack of Transportation (Medical): No     Lack of Transportation (Non-Medical): No   Physical Activity: Patient Declined (5/30/2023)    Exercise Vital Sign     Days of Exercise per Week: Patient declined     Minutes of Exercise per Session: Patient declined   Stress: No Stress Concern Present (5/30/2023)    [employer] [place] [employer] - Occupational Stress Questionnaire     Feeling of Stress : Not at all   Housing Stability: Low Risk  (5/30/2023)    Housing Stability Vital Sign     Unable to Pay for Housing in the Last Year: No     Number of Places Lived in the Last Year: 1     Unstable Housing in the Last Year: No         ROS  Review of Systems   Constitutional: Negative for chills.   HENT: Negative.     Eyes: Negative.    Cardiovascular:  Negative for chest pain, dyspnea on exertion and palpitations.   Respiratory: Negative.  Negative for shortness of breath and sleep disturbances due to breathing.    Endocrine: Negative.    Musculoskeletal: Negative.    Gastrointestinal:  Negative for hematemesis, melena, nausea and vomiting.   Genitourinary: Negative.    Neurological: Negative.    Psychiatric/Behavioral: Negative.  Negative for altered  "mental status.    Allergic/Immunologic: Negative.    Physical Examination     Vital Signs  24 Hour VS Range    Pulse:  [82]   BP: (106-122)/(61-70)   SpO2:  [98 %]       Physical Exam:   Physical Exam  Constitutional:       General: He is not in acute distress.     Appearance: Normal appearance.   HENT:      Head: Normocephalic.      Mouth/Throat:      Mouth: Mucous membranes are moist.   Cardiovascular:      Rate and Rhythm: Tachycardia present. Rhythm irregular.   Pulmonary:      Effort: Pulmonary effort is normal.      Breath sounds: Normal breath sounds.   Abdominal:      Palpations: Abdomen is soft.   Musculoskeletal:      Right lower leg: No edema.      Left lower leg: No edema.   Skin:     General: Skin is warm.   Neurological:      Mental Status: He is alert and oriented to person, place, and time.           Data       No results for input(s): "WBC", "HGB", "HCT", "PLT" in the last 168 hours.     No results for input(s): "PROTIME", "INR" in the last 168 hours.     No results for input(s): "NA", "K", "CL", "CO2", "BUN", "CREATININE", "ANIONGAP", "CALCIUM" in the last 168 hours.     No results for input(s): "PROT", "ALBUMIN", "BILITOT", "ALKPHOS", "AST", "ALT" in the last 168 hours.     No results for input(s): "TROPONINI" in the last 168 hours.     BNP (pg/mL)   Date Value   04/18/2018 245 (H)   03/25/2018 184 (H)   03/11/2018 208 (H)   03/06/2018 194 (H)   02/25/2018 443 (H)       No results for input(s): "LABBLOO" in the last 168 hours.         Assessment & Plan     #Atrial fibrillation  Dr. Kwong Plan from 4/16/24:  Reasonable to restore sinus rhythm since this is a new diagnosis. Unclear if he would tolerate anti-arrhythmic drugs (consider multaq).     TTE 4/8/24:    Left Ventricle: The left ventricle is normal in size. Mildly increased wall thickness. There is normal systolic function. Ejection fraction by visual approximation is 60%. Unable to assess diastolic function due to atrial fibrillation.    " Right Ventricle: Normal right ventricular cavity size. Wall thickness is normal. Right ventricle wall motion  is normal. Systolic function is normal.    Left Atrium: Left atrium is severely dilated.    Aortic Valve: There is mild to moderate aortic regurgitation with a centrally directed jet.    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 There is mild stenosis. The mean pressure gradient across the mitral valve is 7 mmHg at a heart rate of  bpm.    Tricuspid Valve: There is moderate regurgitation.    Pulmonic Valve: There is no stenosis. There is mild regurgitation.    IVC/SVC: Intermediate venous pressure at 8 mmHg.    The estimated pulmonary artery systolic pressure is elevated at 52 mm Hg      -No absolute contraindications of esophageal stricture, tumor, perforation, laceration,or diverticulum and/or active GI bleed.  -The risks, benefits & alternatives of the procedure were explained to the patient.  -The risks of transesophageal echo include but are not limited to:  Dental trauma, esophageal trauma/perforation, bleeding, laryngospasm/brochospasm, aspiration, sore throat/hoarseness, & dislodgement of the endotracheal tube/nasogastric tube (where applicable).  -The risks of moderate sedation include hypotension, respiratory depression, arrhythmias, bronchospasm, & death.  -Prior to procedure, extensive discussion with patient regarding risks and benefits of DCCV at bedside today. The patient voices understanding, all questions have been answered, and patient would like to proceed.  -Informed consent was obtained. The patient is agreeable to proceed with the procedure and all questions and concerns addressed.    Case was discussed with an attending physician prior to procedure.    Livier Grande PA-C  [facility]

## 2024-05-07 NOTE — NURSING
Off unit via wheelchair for discharge home.  IV removed with cath tip intact.  VSS.  Pt and pt spouse verbalized an understanding of d/c instructions.

## 2024-05-07 NOTE — NURSING
Received via stretcher from procedure.  Report from CRNA and RN.  Coughing unproductive cough.  Resp even and unlabored.  VSS.  No c/o pain.  Will continue to monitor.

## 2024-05-07 NOTE — ANESTHESIA PREPROCEDURE EVALUATION
05/07/2024  Rodolfo Messina is a 73 y.o., male.  Patient Active Problem List   Diagnosis    Tremor    HTN (hypertension)    Anxiety    S/P CABG x 1    S/P MVR (mitral valve replacement)    CKD (chronic kidney disease) stage 3, GFR 30-59 ml/min    Normocytic anemia    H/O bacterial endocarditis    Compression fracture of C6 vertebra with routine healing    BPH (benign prostatic hyperplasia)    Thrombocytopenia, unspecified    Hearing loss of right ear    B-cell lymphoma    Anemia    PVC's (premature ventricular contractions)    Essential tremor    New onset atrial fibrillation           Pre-op Assessment    I have reviewed the Patient Summary Reports.       I have reviewed the Medications.     Review of Systems  Anesthesia Hx:  No problems with previous Anesthesia               Denies Personal Hx of Anesthesia complications.                    Cardiovascular:     Hypertension   CAD      Angina           Cardiovascular Symptoms: Angina       Coronary Artery Disease:                            Hypertension     Atrial Fibrillation     Renal/:  Chronic Renal Disease        Kidney Function/Disease                 Physical Exam    Airway:  No airway management difficulties anticipated  Dental:  No active dental issues noted  Chest/Lungs:  Clear to auscultation    Heart:  Rate: Normal  Rhythm: Regular Rhythm  Sounds: Normal        Anesthesia Plan  Type of Anesthesia, risks & benefits discussed:    Anesthesia Type: Gen Natural Airway  Informed Consent: Informed consent signed with the Patient and all parties understand the risks and agree with anesthesia plan.  All questions answered.   ASA Score: 3  Anesthesia Plan Notes: Chart reviewed. Patient seen and examined. Anesthesia plan discussed and questions answered. E-consent signed. Leo York MD    Ready For Surgery From Anesthesia Perspective.     .

## 2024-05-08 ENCOUNTER — TELEPHONE (OUTPATIENT)
Dept: ELECTROPHYSIOLOGY | Facility: CLINIC | Age: 74
End: 2024-05-08
Payer: MEDICARE

## 2024-05-08 NOTE — TELEPHONE ENCOUNTER
----- Message from Jenifer Souza RN sent at 5/7/2024  3:44 PM CDT -----  Regarding: RE: Procedure fu  Patient has a cardioversion today and I assume wants to schedule post procedure  follow up appointment  ----- Message -----  From: Heydi Hathaway MA  Sent: 5/7/2024   3:22 PM CDT  To: Jenifer Souza RN  Subject: RE: Procedure fu                                   ----- Message -----  From: Nogzi Vernon  Sent: 5/7/2024   2:58 PM CDT  To: [facility] Staff  Subject: Procedure fu                                     Patient calling to schedule a procedure fu. Please call back @ [phone]. Thank you Ngozi

## 2024-05-08 NOTE — TELEPHONE ENCOUNTER
----- Message from Mariaelena Kim sent at 5/7/2024  3:32 PM CDT -----  Regarding: Refill  Margaret [phone] pt wife called and says [facility] will have the pt Multaq 400 mg on tomorrow because they didn't have it in stock. She would the rest of the refills sent to [facility].    [facility] - [place], [place] - [address]   Phone: [phone]  Fax: [phone]      Thanks

## 2024-05-13 ENCOUNTER — TELEPHONE (OUTPATIENT)
Dept: ELECTROPHYSIOLOGY | Facility: CLINIC | Age: 74
End: 2024-05-13
Payer: MEDICARE

## 2024-05-13 NOTE — TELEPHONE ENCOUNTER
----- Message from Storm Castillo MA sent at 5/13/2024 11:23 AM CDT -----  Regarding: FW: Meds    ----- Message -----  From: Ngozi Vernon  Sent: 5/13/2024  11:16 AM CDT  To: [facility] Staff  Subject: Meds                                             Patient had to called the ems and was told to call his doctor. He think he had a reaction from his medication that was prescribe to him. Please call back @ [phone]. Thank you Ngozi

## 2024-05-13 NOTE — TELEPHONE ENCOUNTER
Attempted to return patient's call but no answer received. Voicemail left requested return call to discuss symptoms.

## 2024-05-14 ENCOUNTER — TELEPHONE (OUTPATIENT)
Dept: ELECTROPHYSIOLOGY | Facility: CLINIC | Age: 74
End: 2024-05-14
Payer: MEDICARE

## 2024-05-14 ENCOUNTER — HOSPITAL ENCOUNTER (OUTPATIENT)
Dept: CARDIOLOGY | Facility: HOSPITAL | Age: 74
Discharge: HOME | End: 2024-05-14
Attending: INTERNAL MEDICINE
Payer: MEDICARE

## 2024-05-14 ENCOUNTER — TELEPHONE (OUTPATIENT)
Dept: CARDIOLOGY | Facility: CLINIC | Age: 74
End: 2024-05-14

## 2024-05-14 DIAGNOSIS — I48.91 NEW ONSET ATRIAL FIBRILLATION: ICD-10-CM

## 2024-05-14 DIAGNOSIS — Z95.2 S/P MVR (MITRAL VALVE REPLACEMENT): ICD-10-CM

## 2024-05-14 DIAGNOSIS — I49.3 PVC'S (PREMATURE VENTRICULAR CONTRACTIONS): ICD-10-CM

## 2024-05-14 DIAGNOSIS — R07.9 CHEST PAIN, UNSPECIFIED TYPE: Primary | ICD-10-CM

## 2024-05-14 LAB
OHS QRS DURATION: 94 MS
OHS QTC CALCULATION: 441 MS

## 2024-05-14 PROCEDURE — 93005 ELECTROCARDIOGRAM TRACING: CPT | Mod: HCNC,PN

## 2024-05-14 PROCEDURE — 93010 ELECTROCARDIOGRAM REPORT: CPT | Mod: HCNC,,, | Performed by: INTERNAL MEDICINE

## 2024-05-14 NOTE — TELEPHONE ENCOUNTER
Call returned .Spoke with patient who is s/p SOWMYA/DCCV on 5/7/2024, who states that EMS was called yesterday because he had chest pain that was unrelieved at rest >15 mins, and he became very nauseous, pale and weak. Patient states that prior to EMS arrival he checked his heart rhythm on his Kardia monitor and it showed normal sinus, and when EMS arrived they did an EKG which also showed normal sinus rhythm and normal heart rate, but due to symptoms of chest pain and nausea he was advised that he should be taken to the ER for further evaluation, however he refused as he states that his symptoms slowly improved. Confirms that he is asymptomatic at this time. Reports that his BP has been running within normal limits with systolic  - 130's and HR 60-80's. Confirms that he is asymptomatic at this time. Scheduled for 1 week post dccv EKG today . Dr Kwong notified of information received. Patient with hx of CABG, previously followed by Dr Lockett ( last seen 4/24/2024) and scheduled to establish care with Dr Jerez on 8/26/2024. Message to be sent to Dr Jerez's office as patient may require and earlier appointment for further evaluation.

## 2024-05-14 NOTE — TELEPHONE ENCOUNTER
----- Message from Mariaelena Magallont sent at 5/14/2024  9:08 AM CDT -----  Regarding: Concerns  Pt [phone] had a procedure on 5/7/24 and he had and incident on yesterday where EMS was called. He said he lost his color and it was noticed by EMS thru his screen door, but it came back before they left and he chose not to go to ER. He was started on 2 new meds Eliquis ans Multaq 400 mg and he thinks it may be one of or both of those meds that's not agreeing with him. Please call pt at the number listed.    Thanks

## 2024-05-14 NOTE — TELEPHONE ENCOUNTER
----- Message from Ronny Kwong MD sent at 5/14/2024  1:12 PM CDT -----  Sounds like he should have gone to the ER. Would advise him to get a PET stress test and if it occurs again to call 911 and go to the ER      ----- Message -----  From: Jenifer Souza RN  Sent: 5/14/2024  11:07 AM CDT  To: Ronny Kwong MD    Please see patient communication below and advise if any further recs. Scheduled for 1 week post dccv EKG today    Spoke with patient who is s/p SOWMYA/DCCV on 5/7/2024, who states that EMS was called yesterday because he had chest pain that was unrelieved at rest >15 mins, and he became very nauseous, pale and weak. Patient states that prior to EMS arrival he checked his heart rhythm on his Kardia monitor and it showed normal sinus, and when EMS arrived they did an EKG which also showed normal sinus rhythm and normal heart rate, but due to symptoms of chest pain and nausea he was advised that he should be taken to the ER for further evaluation, however he refused as he states that his symptoms slowly improved. Confirms that he is asymptomatic at this time. Reports that his BP has been running within normal limits with systolic  - 130's and HR 60-80's. Confirms that he is asymptomatic at this time. Scheduled for 1 week post dccv EKG today . Dr Kwong notified of information received. Patient with hx of CABG, previously followed by Dr Lockett ( last seen 4/24/2024) and scheduled to establish care with Dr Jerez on 8/26/2024.

## 2024-06-13 ENCOUNTER — HOSPITAL ENCOUNTER (OUTPATIENT)
Dept: CARDIOLOGY | Facility: CLINIC | Age: 74
Discharge: HOME | End: 2024-06-13
Payer: MEDICARE

## 2024-06-13 ENCOUNTER — OFFICE VISIT (OUTPATIENT)
Dept: ELECTROPHYSIOLOGY | Facility: CLINIC | Age: 74
End: 2024-06-13
Payer: MEDICARE

## 2024-06-13 VITALS
DIASTOLIC BLOOD PRESSURE: 74 MMHG | BODY MASS INDEX: 24.4 KG/M2 | SYSTOLIC BLOOD PRESSURE: 128 MMHG | WEIGHT: 170.44 LBS | HEIGHT: 70 IN | HEART RATE: 70 BPM

## 2024-06-13 DIAGNOSIS — N18.31 STAGE 3A CHRONIC KIDNEY DISEASE: ICD-10-CM

## 2024-06-13 DIAGNOSIS — I25.118 ATHEROSCLEROTIC HEART DISEASE OF NATIVE CORONARY ARTERY WITH OTHER FORMS OF ANGINA PECTORIS: ICD-10-CM

## 2024-06-13 DIAGNOSIS — Z86.79 H/O BACTERIAL ENDOCARDITIS: ICD-10-CM

## 2024-06-13 DIAGNOSIS — I10 PRIMARY HYPERTENSION: Chronic | ICD-10-CM

## 2024-06-13 DIAGNOSIS — Z95.1 S/P CABG X 1: ICD-10-CM

## 2024-06-13 DIAGNOSIS — I48.91 NEW ONSET ATRIAL FIBRILLATION: ICD-10-CM

## 2024-06-13 DIAGNOSIS — D69.6 THROMBOCYTOPENIA, UNSPECIFIED: ICD-10-CM

## 2024-06-13 DIAGNOSIS — I48.19 PERSISTENT ATRIAL FIBRILLATION: ICD-10-CM

## 2024-06-13 DIAGNOSIS — I49.3 PVC'S (PREMATURE VENTRICULAR CONTRACTIONS): ICD-10-CM

## 2024-06-13 DIAGNOSIS — I70.0 ATHEROSCLEROSIS OF AORTA: Primary | ICD-10-CM

## 2024-06-13 DIAGNOSIS — Z95.2 S/P MVR (MITRAL VALVE REPLACEMENT): ICD-10-CM

## 2024-06-13 LAB
OHS QRS DURATION: 96 MS
OHS QTC CALCULATION: 447 MS

## 2024-06-13 PROCEDURE — 1159F MED LIST DOCD IN RCRD: CPT | Mod: CPTII,S$GLB,, | Performed by: INTERNAL MEDICINE

## 2024-06-13 PROCEDURE — 3074F SYST BP LT 130 MM HG: CPT | Mod: CPTII,S$GLB,, | Performed by: INTERNAL MEDICINE

## 2024-06-13 PROCEDURE — 3008F BODY MASS INDEX DOCD: CPT | Mod: CPTII,S$GLB,, | Performed by: INTERNAL MEDICINE

## 2024-06-13 PROCEDURE — 1126F AMNT PAIN NOTED NONE PRSNT: CPT | Mod: CPTII,S$GLB,, | Performed by: INTERNAL MEDICINE

## 2024-06-13 PROCEDURE — 3078F DIAST BP <80 MM HG: CPT | Mod: CPTII,S$GLB,, | Performed by: INTERNAL MEDICINE

## 2024-06-13 PROCEDURE — 93005 ELECTROCARDIOGRAM TRACING: CPT | Mod: S$GLB,,, | Performed by: INTERNAL MEDICINE

## 2024-06-13 PROCEDURE — 3044F HG A1C LEVEL LT 7.0%: CPT | Mod: CPTII,S$GLB,, | Performed by: INTERNAL MEDICINE

## 2024-06-13 PROCEDURE — 1160F RVW MEDS BY RX/DR IN RCRD: CPT | Mod: CPTII,S$GLB,, | Performed by: INTERNAL MEDICINE

## 2024-06-13 PROCEDURE — 3288F FALL RISK ASSESSMENT DOCD: CPT | Mod: CPTII,S$GLB,, | Performed by: INTERNAL MEDICINE

## 2024-06-13 PROCEDURE — 99214 OFFICE O/P EST MOD 30 MIN: CPT | Mod: S$GLB,,, | Performed by: INTERNAL MEDICINE

## 2024-06-13 PROCEDURE — 99999 PR PBB SHADOW E&M-EST. PATIENT-LVL III: CPT | Mod: PBBFAC,,, | Performed by: INTERNAL MEDICINE

## 2024-06-13 PROCEDURE — 1101F PT FALLS ASSESS-DOCD LE1/YR: CPT | Mod: CPTII,S$GLB,, | Performed by: INTERNAL MEDICINE

## 2024-06-13 PROCEDURE — 93010 ELECTROCARDIOGRAM REPORT: CPT | Mod: S$GLB,,, | Performed by: INTERNAL MEDICINE

## 2024-06-13 NOTE — PROGRESS NOTES
Subjective   Patient ID:  Rodolfo Messina is a 73 y.o. male who presents for evaluation of Atrial Fibrillation    Referring Cardiologist: Leonel Lockett MD  Primary Care Physician: Deric Claudio MD    HPI  Prior Hx:  I had the pleasure of seeing Mr. Messina today in our electrophysiology clinic in consultation for his atrial arrhythmia. 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 Go complicated by post-op atrial fibrillation (only observed during hospitalization), hypertension, CKD stage 3a and recently diagnosed atrial fibrillation. He recently saw Dr. Lockett for cardiac follow-up and was diagnosed with AF. Eliquis was initiated and he was referred for further care 4/2024. ECG from 6/2023 noted sinus rhythm. We elected to proceed with DCCV.    ECHO 4/2024: normal LVEF, severe LA dilation, no MR    I reviewed available ECGs in EPIC which show sinus rhythm, at times with PVCs, and some slow ventricular rates when in AF in May/June of 2018.    5/2024: SOWMYA/DCCV. Multaq initiated.    Interim Hx:  Mr. Messina returns for follow-up. He feels tired on multaq. He had an episode a week later of feeling weak and nauseous and turned white. EMS was called and his vitals were normal.    My interpretation of today's in-clinic ECG is sinus rhythm with normal intervals.    Review of Systems   Constitutional: Positive for malaise/fatigue. Negative for fever.   HENT:  Negative for congestion and sore throat.    Eyes:  Negative for blurred vision and visual disturbance.   Cardiovascular:  Negative for chest pain, dyspnea on exertion, irregular heartbeat, near-syncope, palpitations and syncope.   Respiratory:  Negative for cough and shortness of breath.    Hematologic/Lymphatic: Negative for bleeding problem. Does not bruise/bleed easily.   Skin: Negative.    Musculoskeletal: Negative.    Gastrointestinal:  Negative for bloating,  abdominal pain, hematochezia and melena.   Neurological:  Negative for focal weakness and weakness.   Psychiatric/Behavioral: Negative.            Objective     Physical Exam  Vitals reviewed.   Constitutional:       General: He is not in acute distress.     Appearance: He is well-developed. He is not diaphoretic.   HENT:      Head: Normocephalic and atraumatic.   Eyes:      General:         Right eye: No discharge.         Left eye: No discharge.      Conjunctiva/sclera: Conjunctivae normal.   Cardiovascular:      Rate and Rhythm: Normal rate. Rhythm irregularly irregular.      Heart sounds: No murmur heard.     No friction rub. No gallop.   Pulmonary:      Effort: Pulmonary effort is normal. No respiratory distress.      Breath sounds: Normal breath sounds. No wheezing or rales.   Abdominal:      General: Bowel sounds are normal. There is no distension.      Palpations: Abdomen is soft.      Tenderness: There is no abdominal tenderness.   Musculoskeletal:      Cervical back: Neck supple.   Skin:     General: Skin is warm and dry.   Neurological:      Mental Status: He is alert and oriented to person, place, and time.   Psychiatric:         Behavior: Behavior normal.         Thought Content: Thought content normal.         Judgment: Judgment normal.            Assessment and Plan     1. Atherosclerosis of aorta    2. Atherosclerotic heart disease of native coronary artery with other forms of angina pectoris    3. PVC's (premature ventricular contractions)    4. S/P CABG x 1    5. Persistent atrial fibrillation    6. Primary hypertension    7. Stage 3a chronic kidney disease        Plan:    In summary, Mr. Messina is a pleasant 73 year-old man acute MR from bacterial endocarditis in 2018 s/p MVR also with LIMA-LAD bypass by Dr. Go complicated by post-op atrial fibrillation (only observed during hospitalization), hypertension, CKD stage 3a and recently diagnosed persistent atrial fibrillation now s/p DCCV. He  feels tired. Unclear if this is from the multaq. Offered to stop AAD therapy and rate control if he went back in AF, as there was no obvious symptoms related to AF. If he feels the same then he can resume.    RTC in 6 months    Thank you for allowing me to participate in the care of this patient. Please do not hesitate to call me with any questions or concerns.    Ronny Kwong MD, PhD  Cardiac Electrophysiology

## 2024-06-18 ENCOUNTER — OFFICE VISIT (OUTPATIENT)
Dept: FAMILY MEDICINE | Facility: CLINIC | Age: 74
End: 2024-06-18
Payer: MEDICARE

## 2024-06-18 VITALS
HEIGHT: 70 IN | BODY MASS INDEX: 24.14 KG/M2 | HEART RATE: 78 BPM | SYSTOLIC BLOOD PRESSURE: 116 MMHG | WEIGHT: 168.63 LBS | TEMPERATURE: 98 F | OXYGEN SATURATION: 98 % | DIASTOLIC BLOOD PRESSURE: 64 MMHG

## 2024-06-18 DIAGNOSIS — R73.9 HYPERGLYCEMIA: ICD-10-CM

## 2024-06-18 DIAGNOSIS — Z00.00 ROUTINE HEALTH MAINTENANCE: Primary | ICD-10-CM

## 2024-06-18 DIAGNOSIS — Z95.1 S/P CABG X 1: ICD-10-CM

## 2024-06-18 DIAGNOSIS — C85.10 B-CELL LYMPHOMA, UNSPECIFIED B-CELL LYMPHOMA TYPE, UNSPECIFIED BODY REGION: ICD-10-CM

## 2024-06-18 DIAGNOSIS — I10 HYPERTENSION, UNSPECIFIED TYPE: ICD-10-CM

## 2024-06-18 DIAGNOSIS — Z86.79 H/O ATRIAL FLUTTER: ICD-10-CM

## 2024-06-20 DIAGNOSIS — C85.10 B-CELL LYMPHOMA, UNSPECIFIED B-CELL LYMPHOMA TYPE, UNSPECIFIED BODY REGION: Primary | ICD-10-CM

## 2024-06-20 RX ORDER — ATORVASTATIN CALCIUM 40 MG/1
TABLET, FILM COATED ORAL
Qty: 90 TABLET | Refills: 2 | Status: SHIPPED | OUTPATIENT
Start: 2024-06-20

## 2024-06-20 RX ORDER — PANTOPRAZOLE SODIUM 40 MG/1
TABLET, DELAYED RELEASE ORAL
Qty: 90 TABLET | Refills: 3 | Status: SHIPPED | OUTPATIENT
Start: 2024-06-20

## 2024-06-20 RX ORDER — MIRTAZAPINE 30 MG/1
30 TABLET, FILM COATED ORAL NIGHTLY
Qty: 90 TABLET | Refills: 2 | Status: SHIPPED | OUTPATIENT
Start: 2024-06-20

## 2024-06-20 NOTE — PROGRESS NOTES
Patient ID: Rodolfo Messina is a 74 y.o. male.    Chief Complaint: Annual Exam    HPI     Rodolfo Messina is a 74 y.o. male. here for annual exam.  Recently had episode of atrial flutter -cardioversion done 05/07/2024.  No complaints of chest pain palpitations or tachycardic this time.  History of having CABG x1        Review of Symptoms    Constitutional: Negative.    HENT: Negative.    Eyes: Negative.    Respiratory: Negative.    Cardiovascular: Negative.    Gastrointestinal: Negative.    Endocrine: Negative.    Genitourinary: Negative.    Musculoskeletal: Negative.    Skin: Negative.    Allergic/Immunologic: Negative.    Neurological: Negative.    Hematological: Negative.    Psychiatric/Behavioral: Negative.      Except as above in HPI    Current Outpatient Medications on File Prior to Visit   Medication Sig Dispense Refill    acetaminophen (TYLENOL) 650 MG TbSR Take 650 mg by mouth every 8 (eight) hours.      amLODIPine (NORVASC) 10 MG tablet Take 1 tablet (10 mg total) by mouth once daily. 90 tablet 3    apixaban (ELIQUIS) 5 mg Tab Take 1 tablet (5 mg total) by mouth 2 (two) times daily. 60 tablet 11    apixaban (ELIQUIS) 5 mg Tab Take 1 tablet (5 mg total) by mouth 2 (two) times daily. 180 tablet 3    ascorbic acid, vitamin C, (VITAMIN C) 1000 MG tablet Take 1,000 mg by mouth every other day.      atorvastatin (LIPITOR) 40 MG tablet GIVE 1 TABLET VIA G TUBE DAILY 90 tablet 3    cetirizine (ZYRTEC) 10 MG tablet Take 10 mg by mouth once daily.      fluticasone propionate (FLONASE) 50 mcg/actuation nasal spray 2 sprays (100 mcg total) by Each Nostril route once daily. 18.2 mL 11    lactose-reduced food (ENSURE HIGH PROTEIN-MUSCLE ORAL) Take by mouth once daily.       magnesium 250 mg Tab Take by mouth once daily.      melatonin 10 mg Tab Take by mouth as needed.       mirtazapine (REMERON) 30 MG tablet TAKE 1 TABLET EVERY EVENING 90 tablet 1    multivit-min/FA/lycopen/lutein (ADULTS 50 PLUS ORAL) Take by  "mouth once daily.       pantoprazole (PROTONIX) 40 MG tablet TAKE 1 TABLET EVERY DAY 90 tablet 3    SAW PALMETTO ORAL Take 450 mg by mouth once daily.      tamsulosin (FLOMAX) 0.4 mg Cap Take 2 capsules (0.8 mg total) by mouth once daily. (Patient taking differently: Take 0.4 mg by mouth once daily.) 180 capsule 0    dronedarone (MULTAQ) 400 mg Tab Take 1 tablet (400 mg total) by mouth 2 (two) times daily with meals. (Patient not taking: Reported on 6/18/2024) 180 tablet 3    EPINEPHrine (EPIPEN) 0.3 mg/0.3 mL AtIn Inject 0.3 mLs (0.3 mg total) into the muscle once. for 1 dose 2 each 1     No current facility-administered medications on file prior to visit.         Physical  Exam    Vitals:    06/18/24 1142   BP: 116/64   BP Location: Left arm   Patient Position: Sitting   Pulse: 78   Temp: 98.4 °F (36.9 °C)   TempSrc: Oral   SpO2: 98%   Weight: 76.5 kg (168 lb 10.4 oz)   Height: 5' 10" (1.778 m)          Constitutional:  Oriented to person, place, and time. Appears well-developed and well-nourished.     HENT:   Head: Normocephalic and atraumatic.     Right Ear: External ear normal     Left Ear: External ear normal      Nose: Nose normal. No rhinorrhea or nasal deformity.     Mouth/Throat: Moist mucus membranes      Eyes: Conjunctivae are normal. Right eye exhibits no discharge. Left eye exhibits no  discharge. No scleral icterus.     Neck:  No JVD present. No tracheal deviation  []  Neck supple.   Carotid Arteries  []  No Bruit    Cardiovascular:  Regular rate and rhythm with normal S1 and S2     Pulmonary/Chest:   Clear to auscultation bilaterally without wheezes, rhonchi or rales    Abdominal: Soft. No distension and no mass.  No tenderness. No rebound and No guarding.     Musculoskeletal: Normal range of motion. No edema or tenderness.   No deformity     Lymphadenopathy:   []  No cervical adenopathy.  []  No inguinal adenopathy    Neurological:  Alert and oriented to person, place, and time. Coordination normal. "     Skin: Skin is warm and dry. No rash noted. No bruising     Psychiatric: Normal mood and affect. Speech is normal and behavior is normal. Judgment and thought content normal.       Assessment / Plan:      ICD-10-CM ICD-9-CM   1. Routine health maintenance  Z00.00 V70.0   2. H/O atrial flutter  Z86.79 V12.59   3. Hyperglycemia  R73.9 790.29   4. S/P CABG x 1  Z95.1 V45.81   5. Hypertension, unspecified type  I10 401.9     Routine health maintenance    H/O atrial flutter    Hyperglycemia    S/P CABG x 1    Hypertension, unspecified type    Overall doing well with current medical problems.  Managing them well without significant problems or life changes.    Declines vaccines  Unclassified B-cell lymphoma    Discussed how to stay healthy             Deric Barrios M.D.

## 2024-06-20 NOTE — TELEPHONE ENCOUNTER
No care due was identified.  [facility] Embedded Care Due Messages. Reference number: [id number].   6/20/2024 12:01:57 PM CDT

## 2024-06-21 ENCOUNTER — TELEPHONE (OUTPATIENT)
Dept: ELECTROPHYSIOLOGY | Facility: CLINIC | Age: 74
End: 2024-06-21
Payer: MEDICARE

## 2024-06-21 NOTE — TELEPHONE ENCOUNTER
Refill Decision Note   Rodolfo Messina  is requesting a refill authorization.  Brief Assessment and Rationale for Refill:  Approve     Medication Therapy Plan:         Comments:     Note composed:9:19 PM 06/20/2024

## 2024-06-21 NOTE — TELEPHONE ENCOUNTER
----- Message from Ronny Kwong MD sent at 6/21/2024  2:45 PM CDT -----  Thanks. As we discussed in clinic, rate control if he goes back into atrial fibrillation as he had no obvious symptomatic benefit with sinus rhythm and felt worse on the medication.  ----- Message -----  From: Heydi Hathaway MA  Sent: 6/21/2024   1:57 PM CDT  To: Ronny Kwong MD    Hi Dr Kwong and Nurse Jenifer,    Just wanted to update you guys, The patient stated that he is feeling much better since he stopped the medication Dronedarone (multaq).  The patient wants to know what's the next step?     Thanks   Heydi

## 2024-07-02 ENCOUNTER — TELEPHONE (OUTPATIENT)
Dept: CARDIOLOGY | Facility: CLINIC | Age: 74
End: 2024-07-02
Payer: MEDICARE

## 2024-07-02 NOTE — TELEPHONE ENCOUNTER
Spoke with Benny from [facility] pharmacy. Pharmacy had a denied for azithromycin wanting to confirm. Advice patient does not have it on current medication. Benny verbalized understanding.     Magdalene CONTRERAS MA  ----- Message from Christiana Boucher sent at 7/1/2024  8:58 AM CDT -----  Type:  Pharmacy Calling to Clarify an RX    Name of Caller:susana   Pharmacy Name:[facility]   Prescription Name:Azithromycin 500mg (not on list)  What do they need to clarify?:refill   Best Call Back Number:[phone]  Additional Information:

## 2024-07-08 RX ORDER — AZITHROMYCIN 500 MG/1
500 TABLET, FILM COATED ORAL ONCE
COMMUNITY
Start: 2024-06-25

## 2024-07-22 ENCOUNTER — TELEPHONE (OUTPATIENT)
Dept: FAMILY MEDICINE | Facility: CLINIC | Age: 74
End: 2024-07-22
Payer: MEDICARE

## 2024-07-22 NOTE — TELEPHONE ENCOUNTER
----- Message from Marybeth Zarco sent at 7/22/2024  3:13 PM CDT -----  Type:  Sooner Apoointment Request    Caller is requesting a sooner appointment.  Caller declined first available appointment listed below.  Caller will not accept being placed on the waitlist and is requesting a message be sent to doctor.  Name of Caller:pt  When is the first available appointment?08/20  Symptoms:needs to get off his apixaban (ELIQUIS) 5 mg Tab worried about what damage it could be doing very concerned about medication needs to get in to discuss with DR please call to get in as soon as possible   Would the patient rather a call back or a response via [facility]? call  Best Call Back Number:   [phone]    Additional Information:

## 2024-07-23 ENCOUNTER — TELEPHONE (OUTPATIENT)
Dept: CARDIOLOGY | Facility: CLINIC | Age: 74
End: 2024-07-23
Payer: MEDICARE

## 2024-07-23 ENCOUNTER — TELEPHONE (OUTPATIENT)
Dept: FAMILY MEDICINE | Facility: CLINIC | Age: 74
End: 2024-07-23
Payer: MEDICARE

## 2024-07-23 ENCOUNTER — TELEPHONE (OUTPATIENT)
Dept: ELECTROPHYSIOLOGY | Facility: CLINIC | Age: 74
End: 2024-07-23
Payer: MEDICARE

## 2024-07-23 NOTE — TELEPHONE ENCOUNTER
He has an updated phone number now. I've added to his chart. Will you send in a refill until he can see cards?

## 2024-07-23 NOTE — TELEPHONE ENCOUNTER
Mr. Messina stated that he's in need of a refill of Eliquis. He is not currently established with a cardiologist, as Dr. Lockett retired. He will be establishing care with a new cardiologist in August. Pt stated that he would really like to be taken off of Eliquis. He mentioned that he had a cardioversion performed by Dr. Kwong a few months ago and has been in NSR since.

## 2024-07-23 NOTE — TELEPHONE ENCOUNTER
----- Message from Heydi Hathaway MA sent at 7/23/2024  3:02 PM CDT -----    ----- Message -----  From: Adelina Cao  Sent: 7/23/2024   1:30 PM CDT  To: [facility] Staff    Type:  Needs Medical Advice    Who Called: pt  Would the patient rather a call back or a response via [facility]? call  Best Call Back Number:  [phone]  Additional Information: pt calling in regards to medication apixaban (ELIQUIS) 5 mg Tab needing to speak with office and being told he can't take it due to him having a heart valve

## 2024-07-23 NOTE — TELEPHONE ENCOUNTER
I tried to call on at least three occasions and there was no answer   NO ability to leave a voicemail    He has an appt with cards in about one month    I suggest staying on the medication   there is a chance you can revert into a fib   you can discuss the risks with the cardiologist

## 2024-07-23 NOTE — TELEPHONE ENCOUNTER
----- Message from Pooldenice Lance sent at 7/23/2024 10:01 AM CDT -----  Contact: pt  Type:  Patient Returning Call    Who Called:ot  Who Left Message for Patient:Romy   Does the patient know what this is regarding?:yes  Would the patient rather a call back or a response via [facility]? call  Best Call Back Number: [phone]  Additional Information:

## 2024-07-23 NOTE — TELEPHONE ENCOUNTER
Returned call to patient.  No answer, no voice mail.   Patient should have refills on his eliquis.  An Rx was sent on 03/20/2024 to [id number] with 11 refills and to [id number] with 3 refills.  Patient should have refills at both pharmacies until 03/20/2025.    ----- Message from Seth Briscoe MA sent at 7/22/2024 12:19 PM CDT -----  Regarding: please advise  I was going to reply but I saw you spoke with him already  ----- Message -----  From: Nereyda Padgett  Sent: 7/19/2024   1:51 PM CDT  To: #    Type:  Medication     Who Called: Pt   Pharmacy name and phone #:  [facility] [address] [place] [address] [address] [facility]  [address] [facility] [address]  Phone: [phone] Fax: [phone]  Would the patient rather a call back or a response via [facility]? call  Best Call Back Number: [phone]  Additional Information:   Pt requesting a call from nurse Odell to contact him regarding pharm not filling apixaban (ELIQUIS) 5 mg Tab  Pharm stated they can not fill prescription without hearing from a provider

## 2024-07-23 NOTE — TELEPHONE ENCOUNTER
Attempted to return call to patient. No answer. Voice mail box is full. Unable to leave message. He does not have portal, unable to respond by portal.

## 2024-07-30 ENCOUNTER — TELEPHONE (OUTPATIENT)
Dept: ELECTROPHYSIOLOGY | Facility: CLINIC | Age: 74
End: 2024-07-30
Payer: MEDICARE

## 2024-08-14 NOTE — SUBJECTIVE & OBJECTIVE
Interval History: no acute event overnight. Patient feels good.     Review of Systems   Constitutional: Negative for chills and fever.   HENT: Negative for nosebleeds, sore throat and trouble swallowing.    Respiratory: Negative for cough, chest tightness, shortness of breath and wheezing.    Cardiovascular: Negative for chest pain and palpitations.   Gastrointestinal: Negative for abdominal distention, abdominal pain, blood in stool, diarrhea, nausea and vomiting.   Genitourinary: Negative for dysuria, frequency, hematuria and urgency.   Neurological: Positive for tremors. Negative for speech difficulty, weakness, light-headedness and headaches.     Objective:     Vital Signs (Most Recent):  Temp: 98.8 °F (37.1 °C) (03/05/18 1411)  Pulse: 91 (03/05/18 1411)  Resp: 14 (03/05/18 1411)  BP: (!) 128/57 (03/05/18 1411)  SpO2: 98 % (03/05/18 1411) Vital Signs (24h Range):  Temp:  [98 °F (36.7 °C)-99.3 °F (37.4 °C)] 98.8 °F (37.1 °C)  Pulse:  [] 91  Resp:  [14-20] 14  SpO2:  [92 %-100 %] 98 %  BP: (123-136)/(57-82) 128/57     Weight: 76 kg (167 lb 8.8 oz)  Body mass index is 24.74 kg/m².    Estimated Creatinine Clearance: 51.2 mL/min (based on SCr of 1.4 mg/dL).    Physical Exam   Constitutional: He is oriented to person, place, and time. He appears well-developed and well-nourished.   HENT:   Head: Normocephalic and atraumatic.   Right Ear: External ear normal.   Left Ear: External ear normal.   No oral ulceration    Eyes: EOM are normal.   Neck: Neck supple. No JVD present.   Cardiovascular: Normal rate, regular rhythm and intact distal pulses.    Murmur (systolic mumur in 4th left intercostal space) heard.  Pulmonary/Chest: He has no wheezes. He has no rales.   Abdominal: Soft. Bowel sounds are normal. He exhibits no distension. There is no tenderness.   Musculoskeletal: He exhibits no edema.   Neurological: He is alert and oriented to person, place, and time.   Skin: Skin is warm and dry. Capillary refill takes  chest wall non-tender, breathing is unlabored without accessory muscle use, normal breath sounds less than 2 seconds.   No obvious skin breaks     Psychiatric: He has a normal mood and affect. His behavior is normal.       Significant Labs: All pertinent labs within the past 24 hours have been reviewed.    Significant Imaging: I have reviewed all pertinent imaging results/findings within the past 24 hours.

## 2024-08-21 ENCOUNTER — LAB VISIT (OUTPATIENT)
Dept: LAB | Facility: HOSPITAL | Age: 74
End: 2024-08-21
Attending: INTERNAL MEDICINE
Payer: MEDICARE

## 2024-08-21 DIAGNOSIS — I48.91 NEW ONSET ATRIAL FIBRILLATION: ICD-10-CM

## 2024-08-21 DIAGNOSIS — Z95.1 S/P CABG X 1: ICD-10-CM

## 2024-08-21 DIAGNOSIS — Z86.79 H/O BACTERIAL ENDOCARDITIS: ICD-10-CM

## 2024-08-21 DIAGNOSIS — I49.3 PVC'S (PREMATURE VENTRICULAR CONTRACTIONS): ICD-10-CM

## 2024-08-21 DIAGNOSIS — I10 PRIMARY HYPERTENSION: Chronic | ICD-10-CM

## 2024-08-21 DIAGNOSIS — C85.10 B-CELL LYMPHOMA, UNSPECIFIED B-CELL LYMPHOMA TYPE, UNSPECIFIED BODY REGION: ICD-10-CM

## 2024-08-21 DIAGNOSIS — N18.30 STAGE 3 CHRONIC KIDNEY DISEASE, UNSPECIFIED WHETHER STAGE 3A OR 3B CKD: ICD-10-CM

## 2024-08-21 DIAGNOSIS — D64.9 NORMOCYTIC ANEMIA: ICD-10-CM

## 2024-08-21 DIAGNOSIS — Z95.2 S/P MVR (MITRAL VALVE REPLACEMENT): ICD-10-CM

## 2024-08-21 LAB
ALBUMIN SERPL BCP-MCNC: 4.2 G/DL (ref 3.5–5.2)
ALBUMIN SERPL BCP-MCNC: 4.2 G/DL (ref 3.5–5.2)
ALP SERPL-CCNC: 62 U/L (ref 38–126)
ALP SERPL-CCNC: 62 U/L (ref 38–126)
ALT SERPL W/O P-5'-P-CCNC: 14 U/L (ref 10–44)
ALT SERPL W/O P-5'-P-CCNC: 14 U/L (ref 10–44)
ANION GAP SERPL CALC-SCNC: 13 MMOL/L (ref 8–16)
ANION GAP SERPL CALC-SCNC: 13 MMOL/L (ref 8–16)
AST SERPL-CCNC: 21 U/L (ref 15–46)
AST SERPL-CCNC: 21 U/L (ref 15–46)
BASOPHILS # BLD AUTO: 0.03 K/UL (ref 0–0.2)
BASOPHILS # BLD AUTO: 0.03 K/UL (ref 0–0.2)
BASOPHILS NFR BLD: 0.7 % (ref 0–1.9)
BASOPHILS NFR BLD: 0.7 % (ref 0–1.9)
BILIRUB SERPL-MCNC: 1.2 MG/DL (ref 0.1–1)
BILIRUB SERPL-MCNC: 1.2 MG/DL (ref 0.1–1)
CALCIUM SERPL-MCNC: 9.9 MG/DL (ref 8.7–10.5)
CALCIUM SERPL-MCNC: 9.9 MG/DL (ref 8.7–10.5)
CHLORIDE SERPL-SCNC: 108 MMOL/L (ref 95–110)
CHLORIDE SERPL-SCNC: 108 MMOL/L (ref 95–110)
CO2 SERPL-SCNC: 24 MMOL/L (ref 23–29)
CO2 SERPL-SCNC: 24 MMOL/L (ref 23–29)
CREAT SERPL-MCNC: 1.43 MG/DL (ref 0.5–1.4)
CREAT SERPL-MCNC: 1.43 MG/DL (ref 0.5–1.4)
DIFFERENTIAL METHOD BLD: ABNORMAL
DIFFERENTIAL METHOD BLD: ABNORMAL
EOSINOPHIL # BLD AUTO: 0.2 K/UL (ref 0–0.5)
EOSINOPHIL # BLD AUTO: 0.2 K/UL (ref 0–0.5)
EOSINOPHIL NFR BLD: 5 % (ref 0–8)
EOSINOPHIL NFR BLD: 5 % (ref 0–8)
ERYTHROCYTE [DISTWIDTH] IN BLOOD BY AUTOMATED COUNT: 12.9 % (ref 11.5–14.5)
ERYTHROCYTE [DISTWIDTH] IN BLOOD BY AUTOMATED COUNT: 12.9 % (ref 11.5–14.5)
EST. GFR  (NO RACE VARIABLE): 51.4 ML/MIN/1.73 M^2
EST. GFR  (NO RACE VARIABLE): 51.4 ML/MIN/1.73 M^2
GLUCOSE SERPL-MCNC: 92 MG/DL (ref 70–110)
GLUCOSE SERPL-MCNC: 92 MG/DL (ref 70–110)
HCT VFR BLD AUTO: 34.6 % (ref 40–54)
HCT VFR BLD AUTO: 34.6 % (ref 40–54)
HGB BLD-MCNC: 11.6 G/DL (ref 14–18)
HGB BLD-MCNC: 11.6 G/DL (ref 14–18)
IMM GRANULOCYTES # BLD AUTO: 0.01 K/UL (ref 0–0.04)
IMM GRANULOCYTES # BLD AUTO: 0.01 K/UL (ref 0–0.04)
IMM GRANULOCYTES NFR BLD AUTO: 0.2 % (ref 0–0.5)
IMM GRANULOCYTES NFR BLD AUTO: 0.2 % (ref 0–0.5)
LDH SERPL L TO P-CCNC: 192 U/L (ref 110–260)
LYMPHOCYTES # BLD AUTO: 1.1 K/UL (ref 1–4.8)
LYMPHOCYTES # BLD AUTO: 1.1 K/UL (ref 1–4.8)
LYMPHOCYTES NFR BLD: 25.6 % (ref 18–48)
LYMPHOCYTES NFR BLD: 25.6 % (ref 18–48)
MCH RBC QN AUTO: 33.4 PG (ref 27–31)
MCH RBC QN AUTO: 33.4 PG (ref 27–31)
MCHC RBC AUTO-ENTMCNC: 33.5 G/DL (ref 32–36)
MCHC RBC AUTO-ENTMCNC: 33.5 G/DL (ref 32–36)
MCV RBC AUTO: 100 FL (ref 82–98)
MCV RBC AUTO: 100 FL (ref 82–98)
MONOCYTES # BLD AUTO: 0.4 K/UL (ref 0.3–1)
MONOCYTES # BLD AUTO: 0.4 K/UL (ref 0.3–1)
MONOCYTES NFR BLD: 10 % (ref 4–15)
MONOCYTES NFR BLD: 10 % (ref 4–15)
NEUTROPHILS # BLD AUTO: 2.6 K/UL (ref 1.8–7.7)
NEUTROPHILS # BLD AUTO: 2.6 K/UL (ref 1.8–7.7)
NEUTROPHILS NFR BLD: 58.5 % (ref 38–73)
NEUTROPHILS NFR BLD: 58.5 % (ref 38–73)
NRBC BLD-RTO: 0 /100 WBC
NRBC BLD-RTO: 0 /100 WBC
PLATELET # BLD AUTO: 114 K/UL (ref 150–450)
PLATELET # BLD AUTO: 114 K/UL (ref 150–450)
PMV BLD AUTO: 11 FL (ref 9.2–12.9)
PMV BLD AUTO: 11 FL (ref 9.2–12.9)
POTASSIUM SERPL-SCNC: 4.8 MMOL/L (ref 3.5–5.1)
POTASSIUM SERPL-SCNC: 4.8 MMOL/L (ref 3.5–5.1)
PROT SERPL-MCNC: 6.9 G/DL (ref 6–8.4)
PROT SERPL-MCNC: 6.9 G/DL (ref 6–8.4)
RBC # BLD AUTO: 3.47 M/UL (ref 4.6–6.2)
RBC # BLD AUTO: 3.47 M/UL (ref 4.6–6.2)
SODIUM SERPL-SCNC: 145 MMOL/L (ref 136–145)
SODIUM SERPL-SCNC: 145 MMOL/L (ref 136–145)
TSH SERPL DL<=0.005 MIU/L-ACNC: 2.04 UIU/ML (ref 0.4–4)
UUN UR-MCNC: 28 MG/DL (ref 2–20)
UUN UR-MCNC: 28 MG/DL (ref 2–20)
WBC # BLD AUTO: 4.42 K/UL (ref 3.9–12.7)
WBC # BLD AUTO: 4.42 K/UL (ref 3.9–12.7)

## 2024-08-21 PROCEDURE — 80053 COMPREHEN METABOLIC PANEL: CPT | Mod: HCNC,PN | Performed by: INTERNAL MEDICINE

## 2024-08-21 PROCEDURE — 36415 COLL VENOUS BLD VENIPUNCTURE: CPT | Mod: HCNC,PN | Performed by: INTERNAL MEDICINE

## 2024-08-21 PROCEDURE — 83615 LACTATE (LD) (LDH) ENZYME: CPT | Mod: HCNC | Performed by: INTERNAL MEDICINE

## 2024-08-21 PROCEDURE — 84443 ASSAY THYROID STIM HORMONE: CPT | Mod: HCNC,PN | Performed by: INTERNAL MEDICINE

## 2024-08-21 PROCEDURE — 85025 COMPLETE CBC W/AUTO DIFF WBC: CPT | Mod: HCNC,PN | Performed by: INTERNAL MEDICINE

## 2024-08-25 NOTE — PROGRESS NOTES
Subjective:   @Patient ID:  Rodolfo Messina is a 74 y.o. male who presents for evaluation of CAD, MVR, A.fib       HPI:   8/2024: Initial visit with me.  Accompanied by his wife.  Pleasant gentleman.  He has been doing okay.  Denies any CP, HAQ, or significant LE edema.  BP well-controlled on current meds.  He is not taking Multaq or Eliquis due to tolerance and cost.  Remains in NSR by physical exam      Historically:  Pt with hx of acute MR from bacterial endocarditis in 2018 s/p MVR also with LIMA-LAD bypass by Dr. Go complicated by post-op atrial fibrillation (only observed during hospitalization), hypertension, CKD stage 3a. Recurrent atrial fibrillation in 2024. Saw Dr. Kwong s/p DCCV and initiation of Multaq.          Prior cardiovascular  Hx  --------------------------------  DCCV 5/2024    - Heart Catheterization  5/2018    LVEDP=12mmHg.     Left dominant coronary circulation.     Seperate ostia of LAD and LCx.     60% mid LAD stenosis; iFR=0.50     - Echo 4/2024    Left Ventricle: The left ventricle is normal in size. Mildly increased wall thickness. There is normal systolic function. Ejection fraction by visual approximation is 60%. Unable to assess diastolic function due to atrial fibrillation.    Right Ventricle: Normal right ventricular cavity size. Wall thickness is normal. Right ventricle wall motion  is normal. Systolic function is normal.    Left Atrium: Left atrium is severely dilated.    Aortic Valve: There is mild to moderate aortic regurgitation with a centrally directed jet.    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 There is mild stenosis. The mean pressure gradient across the mitral valve is 7 mmHg at a heart rate of  bpm.    Tricuspid Valve: There is moderate regurgitation.    Pulmonic Valve: There is no stenosis. There is mild regurgitation.    IVC/SVC: Intermediate venous pressure at 8 mmHg.    The estimated pulmonary artery systolic pressure is  elevated at 52 mm Hg        EKG 2024:  NSR, first-degree AV block, no acute ischemic change         Patient Active Problem List    Diagnosis Date Noted    Atherosclerosis of aorta 2024    Persistent atrial fibrillation 2024    PVC's (premature ventricular contractions) 01/10/2023    Essential tremor 01/10/2023    B-cell lymphoma 2022    Anemia 2022    Thrombocytopenia, unspecified 02/10/2020    Hearing loss of right ear 02/10/2020    BPH (benign prostatic hyperplasia) 10/15/2019    Compression fracture of C6 vertebra with routine healing 10/14/2019    H/O bacterial endocarditis 2019    Normocytic anemia 2018    CKD (chronic kidney disease) stage 3, GFR 30-59 ml/min 2018    S/P CABG x 1 2018    S/P MVR (mitral valve replacement) 2018    Atherosclerotic heart disease of native coronary artery with other forms of angina pectoris 2018    Anxiety 2015    Tremor 2014    HTN (hypertension) 2014           Right Arm BP - Sittin/61  Left Arm BP - Sittin/68        LAST HbA1c  Lab Results   Component Value Date    HGBA1C 5.2 2024       Lipid panel  Lab Results   Component Value Date    CHOL 115 (L) 2024    CHOL 114 (L) 2023    CHOL 109 (L) 2022     Lab Results   Component Value Date    HDL 52 2024    HDL 47 2023    HDL 46 2022     Lab Results   Component Value Date    LDLCALC 49.4 (L) 2024    LDLCALC 53.4 (L) 2023    LDLCALC 49.0 (L) 2022     Lab Results   Component Value Date    TRIG 68 2024    TRIG 68 2023    TRIG 70 2022     Lab Results   Component Value Date    CHOLHDL 45.2 2024    CHOLHDL 41.2 2023    CHOLHDL 42.2 2022            Review of Systems   Constitutional: Negative for chills and fever.   HENT:  Negative for hearing loss and nosebleeds.    Eyes:  Negative for blurred vision.   Cardiovascular:         As in HPI   Respiratory:   Negative for hemoptysis and shortness of breath.    Hematologic/Lymphatic: Negative for bleeding problem.   Skin:  Negative for itching.   Musculoskeletal:  Negative for falls.   Gastrointestinal:  Negative for abdominal pain and hematochezia.   Genitourinary:  Negative for hematuria.   Neurological:  Negative for dizziness and loss of balance.   Psychiatric/Behavioral:  Negative for altered mental status and depression.        Objective:   Physical Exam  Constitutional:       Appearance: He is well-developed.   HENT:      Head: Normocephalic and atraumatic.   Eyes:      Conjunctiva/sclera: Conjunctivae normal.   Neck:      Vascular: No carotid bruit or JVD.   Cardiovascular:      Rate and Rhythm: Normal rate and regular rhythm.      Pulses:           Carotid pulses are 2+ on the right side and 2+ on the left side.       Radial pulses are 2+ on the right side and 2+ on the left side.      Heart sounds: Murmur heard.      No friction rub. No gallop.   Pulmonary:      Effort: Pulmonary effort is normal. No respiratory distress.      Breath sounds: Normal breath sounds. No stridor. No wheezing.   Musculoskeletal:      Cervical back: Neck supple.      Right lower leg: Edema (trace) present.      Left lower leg: Edema (trace) present.   Skin:     General: Skin is warm and dry.   Neurological:      Mental Status: He is alert and oriented to person, place, and time.   Psychiatric:         Behavior: Behavior normal.         Assessment:     1. Atherosclerotic heart disease of native coronary artery with other forms of angina pectoris    2. Atherosclerosis of aorta    3. H/O bacterial endocarditis    4. Primary hypertension    5. Persistent atrial fibrillation    6. S/P CABG x 1    7. S/P MVR (mitral valve replacement)    8. PVC's (premature ventricular contractions)    9. Stage 3a chronic kidney disease        Plan:   Status post mitral valve replacement.  Recent echo with normal bioprosthetic function.  Clinically is  euvolemic with NYHA FC II.  Continue current medical therapy and yearly echocardiogram    He remains in NSR by physical exam.  Not taking Multaq and Eliquis.  We have discussed CVA risk given his elevated chads Vasc score and mitral valve bioprosthesis.  After shared decision he agreed to try another medicine other than Eliquis.  We discussed Coumadin which he is not willing to consider.  We will try Xarelto to see if we will be approved by the insurance.  We will give Xarelto 15 mg given his renal function      LDL at goal.  Continue current statin.  Aspirin 81 mg daily    Low-salt diet    Mediterranean diet      -In today's visit, at least 4 established conditions that pose a risk to life or bodily function have been addressed and the conditions are severe.    -In today's visit, monitoring for drug toxicity was accomplished.     Pertinent cardiac images and EKG reviewed independently.    Continue with current medical plan and lifestyle changes.  Return sooner for concerns or questions. If symptoms persist go to the ED  I have reviewed all pertinent data including patient's medical history in detail and updated the computerized patient record.     No orders of the defined types were placed in this encounter.      Follow up as scheduled.     He expressed verbal understanding and agreed with the plan    Patient's Medications   New Prescriptions    RIVAROXABAN (XARELTO) 15 MG TAB    Take 1 tablet (15 mg total) by mouth daily with dinner or evening meal.   Previous Medications    ACETAMINOPHEN (TYLENOL) 650 MG TBSR    Take 650 mg by mouth every 8 (eight) hours.    AMLODIPINE (NORVASC) 10 MG TABLET    Take 1 tablet (10 mg total) by mouth once daily.    ASCORBIC ACID, VITAMIN C, (VITAMIN C) 1000 MG TABLET    Take 1,000 mg by mouth every other day.    ATORVASTATIN (LIPITOR) 40 MG TABLET    GIVE 1 TABLET VIA G TUBE DAILY    AZITHROMYCIN (ZITHROMAX) 500 MG TABLET    Take 500 mg by mouth once. TAKE ONE TABLET ONE HOUR BEFORE  DENTAL WORK (1 DOSE) AS DIRECTED.    CETIRIZINE (ZYRTEC) 10 MG TABLET    Take 10 mg by mouth once daily.    EPINEPHRINE (EPIPEN) 0.3 MG/0.3 ML ATIN    Inject 0.3 mLs (0.3 mg total) into the muscle once. for 1 dose    FLUTICASONE PROPIONATE (FLONASE) 50 MCG/ACTUATION NASAL SPRAY    2 sprays (100 mcg total) by Each Nostril route once daily.    LACTOSE-REDUCED FOOD (ENSURE HIGH PROTEIN-MUSCLE ORAL)    Take by mouth once daily.     MAGNESIUM 250 MG TAB    Take by mouth once daily.    MELATONIN 10 MG TAB    Take by mouth as needed.     MIRTAZAPINE (REMERON) 30 MG TABLET    TAKE 1 TABLET EVERY EVENING    MULTIVIT-MIN/FA/LYCOPEN/LUTEIN (ADULTS 50 PLUS ORAL)    Take by mouth once daily.     PANTOPRAZOLE (PROTONIX) 40 MG TABLET    TAKE 1 TABLET EVERY DAY    SAW PALMETTO ORAL    Take 450 mg by mouth once daily.    TAMSULOSIN (FLOMAX) 0.4 MG CAP    Take 2 capsules (0.8 mg total) by mouth once daily.   Modified Medications    No medications on file   Discontinued Medications    APIXABAN (ELIQUIS) 5 MG TAB    Take 1 tablet (5 mg total) by mouth 2 (two) times daily.    DRONEDARONE (MULTAQ) 400 MG TAB    Take 1 tablet (400 mg total) by mouth 2 (two) times daily with meals.

## 2024-08-26 ENCOUNTER — OFFICE VISIT (OUTPATIENT)
Dept: CARDIOLOGY | Facility: CLINIC | Age: 74
End: 2024-08-26
Payer: MEDICARE

## 2024-08-26 VITALS
BODY MASS INDEX: 24.2 KG/M2 | OXYGEN SATURATION: 99 % | SYSTOLIC BLOOD PRESSURE: 121 MMHG | HEIGHT: 70 IN | DIASTOLIC BLOOD PRESSURE: 68 MMHG | WEIGHT: 169 LBS | HEART RATE: 80 BPM

## 2024-08-26 DIAGNOSIS — Z95.1 S/P CABG X 1: ICD-10-CM

## 2024-08-26 DIAGNOSIS — N18.31 STAGE 3A CHRONIC KIDNEY DISEASE: ICD-10-CM

## 2024-08-26 DIAGNOSIS — I10 PRIMARY HYPERTENSION: Chronic | ICD-10-CM

## 2024-08-26 DIAGNOSIS — I70.0 ATHEROSCLEROSIS OF AORTA: ICD-10-CM

## 2024-08-26 DIAGNOSIS — I49.3 PVC'S (PREMATURE VENTRICULAR CONTRACTIONS): ICD-10-CM

## 2024-08-26 DIAGNOSIS — Z95.2 S/P MVR (MITRAL VALVE REPLACEMENT): ICD-10-CM

## 2024-08-26 DIAGNOSIS — I48.19 PERSISTENT ATRIAL FIBRILLATION: ICD-10-CM

## 2024-08-26 DIAGNOSIS — Z86.79 H/O BACTERIAL ENDOCARDITIS: ICD-10-CM

## 2024-08-26 DIAGNOSIS — I25.118 ATHEROSCLEROTIC HEART DISEASE OF NATIVE CORONARY ARTERY WITH OTHER FORMS OF ANGINA PECTORIS: Primary | ICD-10-CM

## 2024-08-26 PROCEDURE — 1101F PT FALLS ASSESS-DOCD LE1/YR: CPT | Mod: HCNC,CPTII,S$GLB, | Performed by: INTERNAL MEDICINE

## 2024-08-26 PROCEDURE — 3288F FALL RISK ASSESSMENT DOCD: CPT | Mod: HCNC,CPTII,S$GLB, | Performed by: INTERNAL MEDICINE

## 2024-08-26 PROCEDURE — 3078F DIAST BP <80 MM HG: CPT | Mod: HCNC,CPTII,S$GLB, | Performed by: INTERNAL MEDICINE

## 2024-08-26 PROCEDURE — 3074F SYST BP LT 130 MM HG: CPT | Mod: HCNC,CPTII,S$GLB, | Performed by: INTERNAL MEDICINE

## 2024-08-26 PROCEDURE — 3044F HG A1C LEVEL LT 7.0%: CPT | Mod: HCNC,CPTII,S$GLB, | Performed by: INTERNAL MEDICINE

## 2024-08-26 PROCEDURE — 1126F AMNT PAIN NOTED NONE PRSNT: CPT | Mod: HCNC,CPTII,S$GLB, | Performed by: INTERNAL MEDICINE

## 2024-08-26 PROCEDURE — 1160F RVW MEDS BY RX/DR IN RCRD: CPT | Mod: HCNC,CPTII,S$GLB, | Performed by: INTERNAL MEDICINE

## 2024-08-26 PROCEDURE — 1159F MED LIST DOCD IN RCRD: CPT | Mod: HCNC,CPTII,S$GLB, | Performed by: INTERNAL MEDICINE

## 2024-08-26 PROCEDURE — 99999 PR PBB SHADOW E&M-EST. PATIENT-LVL III: CPT | Mod: PBBFAC,HCNC,, | Performed by: INTERNAL MEDICINE

## 2024-08-26 PROCEDURE — 3008F BODY MASS INDEX DOCD: CPT | Mod: HCNC,CPTII,S$GLB, | Performed by: INTERNAL MEDICINE

## 2024-08-26 PROCEDURE — 99215 OFFICE O/P EST HI 40 MIN: CPT | Mod: HCNC,S$GLB,, | Performed by: INTERNAL MEDICINE

## 2024-10-30 ENCOUNTER — TELEPHONE (OUTPATIENT)
Dept: FAMILY MEDICINE | Facility: CLINIC | Age: 74
End: 2024-10-30
Payer: MEDICARE

## 2024-10-30 DIAGNOSIS — L72.9 SKIN CYST: Primary | ICD-10-CM

## 2024-11-05 ENCOUNTER — TELEPHONE (OUTPATIENT)
Dept: FAMILY MEDICINE | Facility: CLINIC | Age: 74
End: 2024-11-05
Payer: MEDICARE

## 2024-11-06 ENCOUNTER — TELEPHONE (OUTPATIENT)
Dept: CARDIOLOGY | Facility: CLINIC | Age: 74
End: 2024-11-06
Payer: MEDICARE

## 2024-11-06 ENCOUNTER — OFFICE VISIT (OUTPATIENT)
Dept: FAMILY MEDICINE | Facility: CLINIC | Age: 74
End: 2024-11-06
Payer: MEDICARE

## 2024-11-06 VITALS
DIASTOLIC BLOOD PRESSURE: 68 MMHG | BODY MASS INDEX: 23.87 KG/M2 | WEIGHT: 166.75 LBS | HEIGHT: 70 IN | HEART RATE: 84 BPM | TEMPERATURE: 98 F | OXYGEN SATURATION: 94 % | SYSTOLIC BLOOD PRESSURE: 124 MMHG

## 2024-11-06 DIAGNOSIS — I48.19 PERSISTENT ATRIAL FIBRILLATION: ICD-10-CM

## 2024-11-06 DIAGNOSIS — I10 PRIMARY HYPERTENSION: Chronic | ICD-10-CM

## 2024-11-06 DIAGNOSIS — D17.30 LIPOMA OF SKIN: ICD-10-CM

## 2024-11-06 DIAGNOSIS — L72.9 SKIN CYST: Primary | ICD-10-CM

## 2024-11-06 DIAGNOSIS — N18.31 STAGE 3A CHRONIC KIDNEY DISEASE: ICD-10-CM

## 2024-11-06 PROCEDURE — 3074F SYST BP LT 130 MM HG: CPT | Mod: CPTII,S$GLB,, | Performed by: PHYSICIAN ASSISTANT

## 2024-11-06 PROCEDURE — 3044F HG A1C LEVEL LT 7.0%: CPT | Mod: CPTII,S$GLB,, | Performed by: PHYSICIAN ASSISTANT

## 2024-11-06 PROCEDURE — 1160F RVW MEDS BY RX/DR IN RCRD: CPT | Mod: CPTII,S$GLB,, | Performed by: PHYSICIAN ASSISTANT

## 2024-11-06 PROCEDURE — 1101F PT FALLS ASSESS-DOCD LE1/YR: CPT | Mod: CPTII,S$GLB,, | Performed by: PHYSICIAN ASSISTANT

## 2024-11-06 PROCEDURE — 3078F DIAST BP <80 MM HG: CPT | Mod: CPTII,S$GLB,, | Performed by: PHYSICIAN ASSISTANT

## 2024-11-06 PROCEDURE — 3008F BODY MASS INDEX DOCD: CPT | Mod: CPTII,S$GLB,, | Performed by: PHYSICIAN ASSISTANT

## 2024-11-06 PROCEDURE — 3288F FALL RISK ASSESSMENT DOCD: CPT | Mod: CPTII,S$GLB,, | Performed by: PHYSICIAN ASSISTANT

## 2024-11-06 PROCEDURE — 1159F MED LIST DOCD IN RCRD: CPT | Mod: CPTII,S$GLB,, | Performed by: PHYSICIAN ASSISTANT

## 2024-11-06 PROCEDURE — 99214 OFFICE O/P EST MOD 30 MIN: CPT | Mod: S$GLB,,, | Performed by: PHYSICIAN ASSISTANT

## 2024-11-06 NOTE — PROGRESS NOTES
Patient ID: Rodolfo Messina is a 74 y.o. male.     Chief Complaint: Cyst    74 year old male with history of HTN and CKD3a presents to clinic with complaints of cyst to right upper chest and lipoma to right mid back for several years. Patient states the lipoma to the back has been removed once several years ago. States the lesion re-appeared shortly after removal. Denies growth recently, denies any pain associated. States over the last couple of weeks, he noticed the cyst to the chest getting larger. States he thought he saw a head, and tried to express fluid from the cyst over 1 week ago. States following this, he noticed some erythema to the cyst. Endorses pain. Denies spread of the erythema, fever, CP, SOB, N/V/D.    Tried for urine creat sample today, unable to give sample.        Review of Systems  Review of Systems   Constitutional:  Negative for fever.   HENT:  Negative for ear pain and sinus pain.    Eyes:  Negative for discharge.   Respiratory:  Negative for cough and wheezing.    Cardiovascular:  Negative for chest pain and leg swelling.   Gastrointestinal:  Negative for diarrhea, nausea and vomiting.   Genitourinary:  Negative for urgency.   Musculoskeletal:  Negative for myalgias.   Skin:  Negative for rash.   Neurological:  Negative for weakness and headaches.   Psychiatric/Behavioral:  Negative for depression.        Currently Medications  Current Outpatient Medications on File Prior to Visit   Medication Sig Dispense Refill    acetaminophen (TYLENOL) 650 MG TbSR Take 650 mg by mouth every 8 (eight) hours.      amLODIPine (NORVASC) 10 MG tablet Take 1 tablet (10 mg total) by mouth once daily. 90 tablet 3    ascorbic acid, vitamin C, (VITAMIN C) 1000 MG tablet Take 1,000 mg by mouth every other day.      atorvastatin (LIPITOR) 40 MG tablet GIVE 1 TABLET VIA G TUBE DAILY 90 tablet 2    azithromycin (ZITHROMAX) 500 MG tablet Take 500 mg by mouth once. TAKE ONE TABLET ONE HOUR BEFORE DENTAL WORK (1  "DOSE) AS DIRECTED.      cetirizine (ZYRTEC) 10 MG tablet Take 10 mg by mouth once daily.      fluticasone propionate (FLONASE) 50 mcg/actuation nasal spray 2 sprays (100 mcg total) by Each Nostril route once daily. 18.2 mL 11    lactose-reduced food (ENSURE HIGH PROTEIN-MUSCLE ORAL) Take by mouth once daily.       magnesium 250 mg Tab Take by mouth once daily.      melatonin 10 mg Tab Take by mouth as needed.       mirtazapine (REMERON) 30 MG tablet TAKE 1 TABLET EVERY EVENING 90 tablet 2    multivit-min/FA/lycopen/lutein (ADULTS 50 PLUS ORAL) Take by mouth once daily.       pantoprazole (PROTONIX) 40 MG tablet TAKE 1 TABLET EVERY DAY 90 tablet 3    SAW PALMETTO ORAL Take 450 mg by mouth once daily.      tamsulosin (FLOMAX) 0.4 mg Cap Take 2 capsules (0.8 mg total) by mouth once daily. (Patient taking differently: Take 0.4 mg by mouth once daily.) 180 capsule 0    EPINEPHrine (EPIPEN) 0.3 mg/0.3 mL AtIn Inject 0.3 mLs (0.3 mg total) into the muscle once. for 1 dose 2 each 1    rivaroxaban (XARELTO) 15 mg Tab Take 1 tablet (15 mg total) by mouth daily with dinner or evening meal. (Patient not taking: Reported on 11/6/2024) 90 tablet 3     No current facility-administered medications on file prior to visit.       Physical  Exam  Vitals:    11/06/24 0822   BP: 124/68   BP Location: Right arm   Patient Position: Sitting   Pulse: 84   Temp: 97.9 °F (36.6 °C)   SpO2: (!) 94%   Weight: 75.7 kg (166 lb 12.5 oz)   Height: 5' 10" (1.778 m)      Body mass index is 23.93 kg/m².  Wt Readings from Last 3 Encounters:   11/06/24 75.7 kg (166 lb 12.5 oz)   08/26/24 76.7 kg (169 lb)   06/18/24 76.5 kg (168 lb 10.4 oz)       Physical Exam  Vitals and nursing note reviewed.   Constitutional:       General: He is not in acute distress.     Appearance: He is not ill-appearing.   HENT:      Head: Normocephalic and atraumatic.      Right Ear: External ear normal.      Left Ear: External ear normal.      Nose: Nose normal.      " Mouth/Throat:      Mouth: Mucous membranes are moist.   Eyes:      Extraocular Movements: Extraocular movements intact.      Conjunctiva/sclera: Conjunctivae normal.   Cardiovascular:      Rate and Rhythm: Normal rate and regular rhythm.      Pulses: Normal pulses.      Heart sounds: No murmur heard.  Pulmonary:      Effort: Pulmonary effort is normal. No respiratory distress.      Breath sounds: No wheezing.   Abdominal:      General: There is no distension.      Palpations: Abdomen is soft. There is no mass.      Tenderness: There is no abdominal tenderness.   Musculoskeletal:         General: No swelling.      Cervical back: Normal range of motion.   Skin:     Coloration: Skin is not jaundiced.      Findings: No rash.          Neurological:      General: No focal deficit present.      Mental Status: He is alert and oriented to person, place, and time.   Psychiatric:         Mood and Affect: Mood normal.         Thought Content: Thought content normal.         Labs:    Complete Blood Count  Lab Results   Component Value Date    RBC 3.47 (L) 08/21/2024    RBC 3.47 (L) 08/21/2024    HGB 11.6 (L) 08/21/2024    HGB 11.6 (L) 08/21/2024    HCT 34.6 (L) 08/21/2024    HCT 34.6 (L) 08/21/2024     (H) 08/21/2024     (H) 08/21/2024    MCH 33.4 (H) 08/21/2024    MCH 33.4 (H) 08/21/2024    MCHC 33.5 08/21/2024    MCHC 33.5 08/21/2024    RDW 12.9 08/21/2024    RDW 12.9 08/21/2024     (L) 08/21/2024     (L) 08/21/2024    MPV 11.0 08/21/2024    MPV 11.0 08/21/2024    GRAN 2.6 08/21/2024    GRAN 58.5 08/21/2024    GRAN 2.6 08/21/2024    GRAN 58.5 08/21/2024    LYMPH 1.1 08/21/2024    LYMPH 25.6 08/21/2024    LYMPH 1.1 08/21/2024    LYMPH 25.6 08/21/2024    MONO 0.4 08/21/2024    MONO 10.0 08/21/2024    MONO 0.4 08/21/2024    MONO 10.0 08/21/2024    EOS 0.2 08/21/2024    EOS 0.2 08/21/2024    BASO 0.03 08/21/2024    BASO 0.03 08/21/2024    EOSINOPHIL 5.0 08/21/2024    EOSINOPHIL 5.0 08/21/2024     BASOPHIL 0.7 08/21/2024    BASOPHIL 0.7 08/21/2024    DIFFMETHOD Automated 08/21/2024    DIFFMETHOD Automated 08/21/2024       Comprehensive Metabolic Panel  Lab Results   Component Value Date    GLU 92 08/21/2024    GLU 92 08/21/2024    BUN 28 (H) 08/21/2024    BUN 28 (H) 08/21/2024    CREATININE 1.43 (H) 08/21/2024    CREATININE 1.43 (H) 08/21/2024     08/21/2024     08/21/2024    K 4.8 08/21/2024    K 4.8 08/21/2024     08/21/2024     08/21/2024    PROT 6.9 08/21/2024    PROT 6.9 08/21/2024    ALBUMIN 4.2 08/21/2024    ALBUMIN 4.2 08/21/2024    BILITOT 1.2 (H) 08/21/2024    BILITOT 1.2 (H) 08/21/2024    AST 21 08/21/2024    AST 21 08/21/2024    ALKPHOS 62 08/21/2024    ALKPHOS 62 08/21/2024    CO2 24 08/21/2024    CO2 24 08/21/2024    ALT 14 08/21/2024    ALT 14 08/21/2024    ANIONGAP 13 08/21/2024    ANIONGAP 13 08/21/2024       TSH  Lab Results   Component Value Date    TSH 2.040 08/21/2024       Imaging:  Intra-Procedure Documentation  SOWMYA performed in the Invasive Lab    - See Procedure Log link below for nursing documentation    - See SOWMYA order on Card Proc Tab for physician findings   Electrophysiology Procedure    Cardioversion was successfully performed with restoration of normal   sinus rhythm.    I certify that I was present for the critical steps of the procedure   including the diagnostic, surgical and/or interventional portions.     Procedure Log documented by No documenter listed and verified by Ronny Kwong MD.    Date: 5/7/2024  Time: 3:46 PM      Assessment/Plan:    1. Skin cyst  Comments:  - Follow with gen surgery for removal  - Follow back if erythema or TTP worsens  Orders:  -     Cancel: Ambulatory referral/consult to General Surgery; Future; Expected date: 11/13/2024  -     Ambulatory referral/consult to General Surgery; Future; Expected date: 11/13/2024    2. Lipoma of skin  Comments:  - Follow with gen surgery for removal  - Follow back if erythema or TTP  worsens    3. Stage 3a chronic kidney disease  -     Cancel: Microalbumin/Creatinine Ratio, Urine    4. Primary hypertension  Assessment & Plan:  - Chronic, stable  - Continue amlodipine 10mg daily  - Follow with PCP and cardiology        5. Persistent atrial fibrillation  Assessment & Plan:  - Per patient, previous reaction to plavix and elequis  - Pt currently prescribed xarelto, which he never did start due to drug cost and fear of adverse reaction  - Taking 2 81mg ASA daily along with atorvastatin  - Per patient, NSR at home  - Follow with cardiology             Discussed how to stay healthy including: diet, exercise, refraining from smoking and discussed screening exams / tests needed for age, sex and family Hx.    This note was generated with the assistance of ambient listening technology. Verbal consent was obtained by the patient and accompanying visitor(s) for the recording of patient appointment to facilitate this note. I attest to having reviewed and edited the generated note for accuracy, though some syntax or spelling errors may persist. Please contact the author of this note for any clarification.       RTC every 3-6 months with PCP, or PRN    Estefania Pineda PA-C

## 2024-11-06 NOTE — Clinical Note
Good morning. Patient above sees Dr. Graysonsef. I see he is offline and I could not message him. Patient with CAD, S/P CABG, as well as persistent afib presents to me in clinic today. Patient states that he is supposed to be taking xarelto, though had not picked up medication due to drug cost. States he had previously tried elequis and plavix with adverse reactions. States he is taking 40mg atorvastatin and 2 81mg aspirin daily. Do you have recommendations? Thanks!

## 2024-11-06 NOTE — TELEPHONE ENCOUNTER
----- Message from Estefania Pineda PA-C sent at 11/6/2024  8:55 AM CST -----  Good morning. Patient above sees Dr. Maganaf. I see he is offline and I could not message him. Patient with CAD, S/P CABG, as well as persistent afib presents to me in clinic today. Patient states that he is supposed to be taking xarelto, though had not picked up medication due to drug cost. States he had previously tried elequis and plavix with adverse reactions. States he is taking 40mg atorvastatin and 2 81mg aspirin daily. Do you have recommendations? Thanks!

## 2024-11-06 NOTE — ASSESSMENT & PLAN NOTE
- Per patient, previous reaction to plavix and elequis  - Pt currently prescribed xarelto, which he never did start due to drug cost and fear of adverse reaction  - Taking 2 81mg ASA daily along with atorvastatin  - Per patient, NSR at home  - Follow with cardiology

## 2024-11-08 ENCOUNTER — OFFICE VISIT (OUTPATIENT)
Dept: SURGERY | Facility: CLINIC | Age: 74
End: 2024-11-08
Payer: MEDICARE

## 2024-11-08 VITALS
HEIGHT: 70 IN | BODY MASS INDEX: 24.32 KG/M2 | WEIGHT: 169.88 LBS | HEART RATE: 66 BPM | DIASTOLIC BLOOD PRESSURE: 71 MMHG | SYSTOLIC BLOOD PRESSURE: 151 MMHG

## 2024-11-08 DIAGNOSIS — L72.9 SKIN CYST: ICD-10-CM

## 2024-11-08 PROCEDURE — 3044F HG A1C LEVEL LT 7.0%: CPT | Mod: HCNC,CPTII,S$GLB, | Performed by: STUDENT IN AN ORGANIZED HEALTH CARE EDUCATION/TRAINING PROGRAM

## 2024-11-08 PROCEDURE — 99204 OFFICE O/P NEW MOD 45 MIN: CPT | Mod: 25,HCNC,S$GLB, | Performed by: STUDENT IN AN ORGANIZED HEALTH CARE EDUCATION/TRAINING PROGRAM

## 2024-11-08 PROCEDURE — 3078F DIAST BP <80 MM HG: CPT | Mod: HCNC,CPTII,S$GLB, | Performed by: STUDENT IN AN ORGANIZED HEALTH CARE EDUCATION/TRAINING PROGRAM

## 2024-11-08 PROCEDURE — 99999 PR PBB SHADOW E&M-EST. PATIENT-LVL III: CPT | Mod: PBBFAC,HCNC,, | Performed by: STUDENT IN AN ORGANIZED HEALTH CARE EDUCATION/TRAINING PROGRAM

## 2024-11-08 PROCEDURE — 1125F AMNT PAIN NOTED PAIN PRSNT: CPT | Mod: HCNC,CPTII,S$GLB, | Performed by: STUDENT IN AN ORGANIZED HEALTH CARE EDUCATION/TRAINING PROGRAM

## 2024-11-08 PROCEDURE — 1101F PT FALLS ASSESS-DOCD LE1/YR: CPT | Mod: HCNC,CPTII,S$GLB, | Performed by: STUDENT IN AN ORGANIZED HEALTH CARE EDUCATION/TRAINING PROGRAM

## 2024-11-08 PROCEDURE — 3288F FALL RISK ASSESSMENT DOCD: CPT | Mod: HCNC,CPTII,S$GLB, | Performed by: STUDENT IN AN ORGANIZED HEALTH CARE EDUCATION/TRAINING PROGRAM

## 2024-11-08 PROCEDURE — 3077F SYST BP >= 140 MM HG: CPT | Mod: HCNC,CPTII,S$GLB, | Performed by: STUDENT IN AN ORGANIZED HEALTH CARE EDUCATION/TRAINING PROGRAM

## 2024-11-08 PROCEDURE — 11404 EXC TR-EXT B9+MARG 3.1-4 CM: CPT | Mod: HCNC,S$GLB,, | Performed by: STUDENT IN AN ORGANIZED HEALTH CARE EDUCATION/TRAINING PROGRAM

## 2024-11-08 PROCEDURE — 3008F BODY MASS INDEX DOCD: CPT | Mod: HCNC,CPTII,S$GLB, | Performed by: STUDENT IN AN ORGANIZED HEALTH CARE EDUCATION/TRAINING PROGRAM

## 2024-11-08 RX ORDER — CLINDAMYCIN HYDROCHLORIDE 300 MG/1
300 CAPSULE ORAL EVERY 6 HOURS
Qty: 20 CAPSULE | Refills: 0 | Status: SHIPPED | OUTPATIENT
Start: 2024-11-08 | End: 2024-11-13

## 2024-11-08 NOTE — PROGRESS NOTES
Subjective:      Rodolfo Messina is a 74 y.o. year old male who presents to the general surgery Clinic on 11/08/2024 for right chest cyst. It has been there for many years. It bothers him and causes discomfort when he sleeps. It is now a little  red and has been for about a day. No fevers. Notifies another lump on his back that he says is a recurrence from his last time he got it removed. He thinks it is a lipoma. He is not on Xarelto but states they want him on it for A-fib. He takes ASA every day. Medical history below.     Vitals:    11/08/24 1432   BP: (!) 151/71   Pulse: 66        Patient Active Problem List   Diagnosis    Tremor    HTN (hypertension)    Anxiety    Atherosclerotic heart disease of native coronary artery with other forms of angina pectoris    S/P CABG x 1    S/P MVR (mitral valve replacement)    Stage 3a chronic kidney disease    Normocytic anemia    H/O bacterial endocarditis    Compression fracture of C6 vertebra with routine healing    BPH (benign prostatic hyperplasia)    Thrombocytopenia, unspecified    Hearing loss of right ear    B-cell lymphoma    Anemia    PVC's (premature ventricular contractions)    Essential tremor    Persistent atrial fibrillation    Atherosclerosis of aorta       Current Outpatient Medications   Medication Sig Dispense Refill    acetaminophen (TYLENOL) 650 MG TbSR Take 650 mg by mouth every 8 (eight) hours.      amLODIPine (NORVASC) 10 MG tablet Take 1 tablet (10 mg total) by mouth once daily. 90 tablet 3    ascorbic acid, vitamin C, (VITAMIN C) 1000 MG tablet Take 1,000 mg by mouth every other day.      atorvastatin (LIPITOR) 40 MG tablet GIVE 1 TABLET VIA G TUBE DAILY 90 tablet 2    azithromycin (ZITHROMAX) 500 MG tablet Take 500 mg by mouth once. TAKE ONE TABLET ONE HOUR BEFORE DENTAL WORK (1 DOSE) AS DIRECTED.      cetirizine (ZYRTEC) 10 MG tablet Take 10 mg by mouth once daily.      EPINEPHrine (EPIPEN) 0.3 mg/0.3 mL AtIn Inject 0.3 mLs (0.3 mg total) into  the muscle once. for 1 dose 2 each 1    fluticasone propionate (FLONASE) 50 mcg/actuation nasal spray 2 sprays (100 mcg total) by Each Nostril route once daily. 18.2 mL 11    lactose-reduced food (ENSURE HIGH PROTEIN-MUSCLE ORAL) Take by mouth once daily.       magnesium 250 mg Tab Take by mouth once daily.      melatonin 10 mg Tab Take by mouth as needed.       mirtazapine (REMERON) 30 MG tablet TAKE 1 TABLET EVERY EVENING 90 tablet 2    multivit-min/FA/lycopen/lutein (ADULTS 50 PLUS ORAL) Take by mouth once daily.       pantoprazole (PROTONIX) 40 MG tablet TAKE 1 TABLET EVERY DAY 90 tablet 3    rivaroxaban (XARELTO) 15 mg Tab Take 1 tablet (15 mg total) by mouth daily with dinner or evening meal. (Patient not taking: Reported on 11/6/2024) 90 tablet 3    SAW PALMETTO ORAL Take 450 mg by mouth once daily.      tamsulosin (FLOMAX) 0.4 mg Cap Take 2 capsules (0.8 mg total) by mouth once daily. (Patient taking differently: Take 0.4 mg by mouth once daily.) 180 capsule 0     No current facility-administered medications for this visit.       Review of Systems:  See HPI otherwise negative.   Objective:     Physical Exam:  General: No acute distress  HEENT: atraumatic  Resp: No resp distress, effort normal, normal chest movements   Cardiac/chest: right sided chest mass mobilize with come redness. . Minimal tenderness   Back: Back mass. No redness   Skin: warm and dry   Neuro: AOX4, at baseline  Psych: Behavior normal       Assessment:       1. Skin cyst      74M with right sided chest cyst. Wants removed. Can do in clinic today.   Plan:   Excisions of right chest cyst in clinic today  Will plan for another time if his back mass if continues to bother him  Consent obtained  See procedure below  Wound care instructions given  5 days antibiotics of clindamycin   Follow-up prn       General Surgery Bedside Procedure        Procedure: Removal right chest cyst         Patient consented to excision right chest      right chest  prepped and draped in normal sterile fashion  All 5 sterile barriers used   10 cc 1% lidocaine with epi injected locally to skin around catheter  Elliptical incisions 4cm x 3 cm made  Sharp dissection used to around cyst without issue . Removed from field  Bleeding controlled with pressure and vicryl. Minimal. Irrigated and hemostatic.   Incision closed with 3-0 vicryl. Pressure  dressing applied given pocket that cyst     Patient tolerated well.            Todd Glass  PGY-4

## 2024-12-11 DIAGNOSIS — N18.31 STAGE 3A CHRONIC KIDNEY DISEASE: ICD-10-CM

## 2025-01-03 ENCOUNTER — TELEPHONE (OUTPATIENT)
Dept: FAMILY MEDICINE | Facility: CLINIC | Age: 75
End: 2025-01-03
Payer: MEDICARE

## 2025-01-03 NOTE — TELEPHONE ENCOUNTER
----- Message from Christiana sent at 1/3/2025  4:02 PM CST -----  Type:  Needs Medical Advice    Who Called: wife   Symptoms (please be specific): Symptom: Chest Congestion  Outcome: Talk to a nurse or provider within 15 minutes.  Reason: Age less than 5 years old with a barky, tight cough (or croup by caller's report)   How long has patient had these symptoms:  4 days  Would the patient rather a call back or a response via [facility]? Call   Best Call Back Number: [phone]  Additional Information:     Pt is scheduled on 01/06

## 2025-01-03 NOTE — TELEPHONE ENCOUNTER
Spoke to pt. Pt is scheduled with Dr. Rosas on 1/6/25.  I advised pt to go to [facility] if needed over the weekend.    Pt complaining of congestion and coughing x 4 days

## 2025-01-06 ENCOUNTER — OFFICE VISIT (OUTPATIENT)
Dept: FAMILY MEDICINE | Facility: CLINIC | Age: 75
End: 2025-01-06
Payer: MEDICARE

## 2025-01-06 VITALS
HEIGHT: 70 IN | TEMPERATURE: 99 F | WEIGHT: 166.69 LBS | DIASTOLIC BLOOD PRESSURE: 62 MMHG | SYSTOLIC BLOOD PRESSURE: 132 MMHG | OXYGEN SATURATION: 98 % | HEART RATE: 88 BPM | BODY MASS INDEX: 23.86 KG/M2

## 2025-01-06 DIAGNOSIS — N18.31 STAGE 3A CHRONIC KIDNEY DISEASE: ICD-10-CM

## 2025-01-06 DIAGNOSIS — C85.10 B-CELL LYMPHOMA, UNSPECIFIED B-CELL LYMPHOMA TYPE, UNSPECIFIED BODY REGION: ICD-10-CM

## 2025-01-06 DIAGNOSIS — R05.1 ACUTE COUGH: ICD-10-CM

## 2025-01-06 DIAGNOSIS — I48.19 PERSISTENT ATRIAL FIBRILLATION: ICD-10-CM

## 2025-01-06 DIAGNOSIS — J01.00 ACUTE NON-RECURRENT MAXILLARY SINUSITIS: Primary | ICD-10-CM

## 2025-01-06 PROCEDURE — 3008F BODY MASS INDEX DOCD: CPT | Mod: CPTII,S$GLB,, | Performed by: STUDENT IN AN ORGANIZED HEALTH CARE EDUCATION/TRAINING PROGRAM

## 2025-01-06 PROCEDURE — 1160F RVW MEDS BY RX/DR IN RCRD: CPT | Mod: CPTII,S$GLB,, | Performed by: STUDENT IN AN ORGANIZED HEALTH CARE EDUCATION/TRAINING PROGRAM

## 2025-01-06 PROCEDURE — 1159F MED LIST DOCD IN RCRD: CPT | Mod: CPTII,S$GLB,, | Performed by: STUDENT IN AN ORGANIZED HEALTH CARE EDUCATION/TRAINING PROGRAM

## 2025-01-06 PROCEDURE — 1101F PT FALLS ASSESS-DOCD LE1/YR: CPT | Mod: CPTII,S$GLB,, | Performed by: STUDENT IN AN ORGANIZED HEALTH CARE EDUCATION/TRAINING PROGRAM

## 2025-01-06 PROCEDURE — 3075F SYST BP GE 130 - 139MM HG: CPT | Mod: CPTII,S$GLB,, | Performed by: STUDENT IN AN ORGANIZED HEALTH CARE EDUCATION/TRAINING PROGRAM

## 2025-01-06 PROCEDURE — 99214 OFFICE O/P EST MOD 30 MIN: CPT | Mod: S$GLB,,, | Performed by: STUDENT IN AN ORGANIZED HEALTH CARE EDUCATION/TRAINING PROGRAM

## 2025-01-06 PROCEDURE — 3288F FALL RISK ASSESSMENT DOCD: CPT | Mod: CPTII,S$GLB,, | Performed by: STUDENT IN AN ORGANIZED HEALTH CARE EDUCATION/TRAINING PROGRAM

## 2025-01-06 PROCEDURE — 1126F AMNT PAIN NOTED NONE PRSNT: CPT | Mod: CPTII,S$GLB,, | Performed by: STUDENT IN AN ORGANIZED HEALTH CARE EDUCATION/TRAINING PROGRAM

## 2025-01-06 PROCEDURE — 3078F DIAST BP <80 MM HG: CPT | Mod: CPTII,S$GLB,, | Performed by: STUDENT IN AN ORGANIZED HEALTH CARE EDUCATION/TRAINING PROGRAM

## 2025-01-06 RX ORDER — PREDNISONE 10 MG/1
TABLET ORAL
Qty: 15 TABLET | Refills: 0 | Status: SHIPPED | OUTPATIENT
Start: 2025-01-06 | End: 2025-01-11

## 2025-01-06 RX ORDER — DOXYCYCLINE HYCLATE 100 MG
100 TABLET ORAL 2 TIMES DAILY
Qty: 10 TABLET | Refills: 0 | Status: SHIPPED | OUTPATIENT
Start: 2025-01-06 | End: 2025-01-11

## 2025-01-06 RX ORDER — PROMETHAZINE HYDROCHLORIDE AND DEXTROMETHORPHAN HYDROBROMIDE 6.25; 15 MG/5ML; MG/5ML
5 SYRUP ORAL EVERY 6 HOURS PRN
Qty: 120 ML | Refills: 0 | Status: SHIPPED | OUTPATIENT
Start: 2025-01-06 | End: 2025-01-16

## 2025-01-06 NOTE — PROGRESS NOTES
Patient ID: Rodolfo Messina is a 74 y.o. male.     Chief Complaint: Nasal Congestion    HPI  History of Present Illness    Rodolfo presents today with congestion.    He presents with congestion and productive cough that started around New Year's, lasting five days. He reports taking Benadryl and Ritostim at home with some relief. He denies fever, sore throat, diarrhea, nausea, vomiting, body aches, chills, or wheezing.    He has a history of endocarditis requiring prophylactic antibiotics for dental procedures.    He underwent wisdom tooth extraction just prior to New Year's. Following prophylactic antibiotics for the procedure, he developed a generalized rash with severe itching, particularly affecting the hands.    He discontinued annual flu vaccines after experiencing severe illness requiring one week of bed rest following previous vaccination.           Review of Systems  Review of Systems   Constitutional:  Negative for fever.   HENT:  Negative for ear pain and sinus pain.    Eyes:  Negative for discharge.   Respiratory:  Negative for cough and shortness of breath.    Cardiovascular:  Negative for chest pain and leg swelling.   Gastrointestinal:  Negative for diarrhea, nausea and vomiting.   Genitourinary:  Negative for urgency.   Musculoskeletal:  Negative for myalgias.   Skin:  Negative for rash.   Neurological:  Negative for weakness and headaches.   Psychiatric/Behavioral:  Negative for depression.    All other systems reviewed and are negative.      Currently Medications  Current Outpatient Medications on File Prior to Visit   Medication Sig Dispense Refill    acetaminophen (TYLENOL) 650 MG TbSR Take 650 mg by mouth every 8 (eight) hours.      amLODIPine (NORVASC) 10 MG tablet Take 1 tablet (10 mg total) by mouth once daily. 90 tablet 3    ascorbic acid, vitamin C, (VITAMIN C) 1000 MG tablet Take 1,000 mg by mouth every other day.      atorvastatin (LIPITOR) 40 MG tablet GIVE 1 TABLET VIA G TUBE DAILY 90  "tablet 2    azithromycin (ZITHROMAX) 500 MG tablet Take 500 mg by mouth once. TAKE ONE TABLET ONE HOUR BEFORE DENTAL WORK (1 DOSE) AS DIRECTED.      cetirizine (ZYRTEC) 10 MG tablet Take 10 mg by mouth once daily.      fluticasone propionate (FLONASE) 50 mcg/actuation nasal spray 2 sprays (100 mcg total) by Each Nostril route once daily. 18.2 mL 11    lactose-reduced food (ENSURE HIGH PROTEIN-MUSCLE ORAL) Take by mouth once daily.       magnesium 250 mg Tab Take by mouth once daily.      melatonin 10 mg Tab Take by mouth as needed.       mirtazapine (REMERON) 30 MG tablet TAKE 1 TABLET EVERY EVENING 90 tablet 2    multivit-min/FA/lycopen/lutein (ADULTS 50 PLUS ORAL) Take by mouth once daily.       pantoprazole (PROTONIX) 40 MG tablet TAKE 1 TABLET EVERY DAY 90 tablet 3    rivaroxaban (XARELTO) 15 mg Tab Take 1 tablet (15 mg total) by mouth daily with dinner or evening meal. 90 tablet 3    SAW PALMETTO ORAL Take 450 mg by mouth once daily.      tamsulosin (FLOMAX) 0.4 mg Cap Take 2 capsules (0.8 mg total) by mouth once daily. (Patient taking differently: Take 0.4 mg by mouth once daily.) 180 capsule 0    EPINEPHrine (EPIPEN) 0.3 mg/0.3 mL AtIn Inject 0.3 mLs (0.3 mg total) into the muscle once. for 1 dose 2 each 1     No current facility-administered medications on file prior to visit.       Physical  Exam  Vitals:    01/06/25 1341   BP: 132/62   BP Location: Right arm   Patient Position: Sitting   Pulse: 88   Temp: 98.5 °F (36.9 °C)   SpO2: 98%   Weight: 75.6 kg (166 lb 10.7 oz)   Height: 5' 10" (1.778 m)      Body mass index is 23.91 kg/m².  Wt Readings from Last 3 Encounters:   01/06/25 75.6 kg (166 lb 10.7 oz)   11/08/24 77 kg (169 lb 13.8 oz)   11/06/24 75.7 kg (166 lb 12.5 oz)       Physical Exam  Vitals and nursing note reviewed.   Constitutional:       General: He is not in acute distress.     Appearance: He is not ill-appearing.   HENT:      Head: Normocephalic and atraumatic.      Right Ear: External ear " normal.      Left Ear: External ear normal.      Nose: Congestion present.      Mouth/Throat:      Mouth: Mucous membranes are moist.   Eyes:      Extraocular Movements: Extraocular movements intact.      Conjunctiva/sclera: Conjunctivae normal.   Cardiovascular:      Rate and Rhythm: Normal rate and regular rhythm.      Pulses: Normal pulses.      Heart sounds: No murmur heard.  Pulmonary:      Effort: Pulmonary effort is normal. No respiratory distress.      Breath sounds: No wheezing.   Abdominal:      General: There is no distension.      Palpations: Abdomen is soft. There is no mass.      Tenderness: There is no abdominal tenderness.   Musculoskeletal:         General: No swelling.      Cervical back: Normal range of motion.   Skin:     Coloration: Skin is not jaundiced.      Findings: No rash.   Neurological:      General: No focal deficit present.      Mental Status: He is alert and oriented to person, place, and time.   Psychiatric:         Mood and Affect: Mood normal.         Thought Content: Thought content normal.         Labs:    Complete Blood Count  Lab Results   Component Value Date    RBC 3.47 (L) 08/21/2024    RBC 3.47 (L) 08/21/2024    HGB 11.6 (L) 08/21/2024    HGB 11.6 (L) 08/21/2024    HCT 34.6 (L) 08/21/2024    HCT 34.6 (L) 08/21/2024     (H) 08/21/2024     (H) 08/21/2024    MCH 33.4 (H) 08/21/2024    MCH 33.4 (H) 08/21/2024    MCHC 33.5 08/21/2024    MCHC 33.5 08/21/2024    RDW 12.9 08/21/2024    RDW 12.9 08/21/2024     (L) 08/21/2024     (L) 08/21/2024    MPV 11.0 08/21/2024    MPV 11.0 08/21/2024    GRAN 2.6 08/21/2024    GRAN 58.5 08/21/2024    GRAN 2.6 08/21/2024    GRAN 58.5 08/21/2024    LYMPH 1.1 08/21/2024    LYMPH 25.6 08/21/2024    LYMPH 1.1 08/21/2024    LYMPH 25.6 08/21/2024    MONO 0.4 08/21/2024    MONO 10.0 08/21/2024    MONO 0.4 08/21/2024    MONO 10.0 08/21/2024    EOS 0.2 08/21/2024    EOS 0.2 08/21/2024    BASO 0.03 08/21/2024    BASO 0.03  08/21/2024    EOSINOPHIL 5.0 08/21/2024    EOSINOPHIL 5.0 08/21/2024    BASOPHIL 0.7 08/21/2024    BASOPHIL 0.7 08/21/2024    DIFFMETHOD Automated 08/21/2024    DIFFMETHOD Automated 08/21/2024       Comprehensive Metabolic Panel  Lab Results   Component Value Date    GLU 92 08/21/2024    GLU 92 08/21/2024    BUN 28 (H) 08/21/2024    BUN 28 (H) 08/21/2024    CREATININE 1.43 (H) 08/21/2024    CREATININE 1.43 (H) 08/21/2024     08/21/2024     08/21/2024    K 4.8 08/21/2024    K 4.8 08/21/2024     08/21/2024     08/21/2024    PROT 6.9 08/21/2024    PROT 6.9 08/21/2024    ALBUMIN 4.2 08/21/2024    ALBUMIN 4.2 08/21/2024    BILITOT 1.2 (H) 08/21/2024    BILITOT 1.2 (H) 08/21/2024    AST 21 08/21/2024    AST 21 08/21/2024    ALKPHOS 62 08/21/2024    ALKPHOS 62 08/21/2024    CO2 24 08/21/2024    CO2 24 08/21/2024    ALT 14 08/21/2024    ALT 14 08/21/2024    ANIONGAP 13 08/21/2024    ANIONGAP 13 08/21/2024       TSH  Lab Results   Component Value Date    TSH 2.040 08/21/2024       Imaging:  Intra-Procedure Documentation  SOWMYA performed in the Invasive Lab    - See Procedure Log link below for nursing documentation    - See SOWMYA order on Card Proc Tab for physician findings   Electrophysiology Procedure    Cardioversion was successfully performed with restoration of normal   sinus rhythm.    I certify that I was present for the critical steps of the procedure   including the diagnostic, surgical and/or interventional portions.     Procedure Log documented by No documenter listed and verified by Ronny Kwong MD.    Date: 5/7/2024  Time: 3:46 PM      Assessment/Plan:  Assessment & Plan    Assessed symptoms consistent with upper respiratory infection, ongoing for 5 days  Ruled out need for flu or COVID testing due to timeframe (>2 days since symptom onset)  Excluded RSV based on clear lung sounds  OTC Mucinex for symptom management          1. Acute non-recurrent maxillary sinusitis  -     doxycycline  (VIBRA-TABS) 100 MG tablet; Take 1 tablet (100 mg total) by mouth 2 (two) times daily. for 5 days  Dispense: 10 tablet; Refill: 0  -     predniSONE (DELTASONE) 10 MG tablet; Take 5 tablets (50 mg total) by mouth once daily for 1 day, THEN 4 tablets (40 mg total) once daily for 1 day, THEN 3 tablets (30 mg total) once daily for 1 day, THEN 2 tablets (20 mg total) once daily for 1 day, THEN 1 tablet (10 mg total) once daily for 1 day.  Dispense: 15 tablet; Refill: 0    2. B-cell lymphoma, unspecified B-cell lymphoma type, unspecified body region  Assessment & Plan:   indolent non-hodgkin's b cell  lymphoma with involvement in the bone marrow via BM biopsy on 7/13/18 with 5-10% of marrow involved and no myeloid disorders  -pan ct June 2019  With no evidence of adenopathy  - no current symptoms      3. Persistent atrial fibrillation  Assessment & Plan:  - chronic  - on xarelto      4. Stage 3a chronic kidney disease  Assessment & Plan:  - chronic  - avoid NSAIDs      5. Acute cough  -     promethazine-dextromethorphan (PROMETHAZINE-DM) 6.25-15 mg/5 mL Syrp; Take 5 mLs by mouth every 6 (six) hours as needed (cough).  Dispense: 120 mL; Refill: 0         Discussed how to stay healthy including: diet, exercise, refraining from smoking and discussed screening exams / tests needed for age, sex and family Hx.      Reji Rosas MD    This note was generated with the assistance of ambient listening technology. Verbal consent was obtained by the patient and accompanying visitor(s) for the recording of patient appointment to facilitate this note. I attest to having reviewed and edited the generated note for accuracy, though some syntax or spelling errors may persist. Please contact the author of this note for any clarification.

## 2025-01-06 NOTE — ASSESSMENT & PLAN NOTE
indolent non-hodgkin's b cell  lymphoma with involvement in the bone marrow via BM biopsy on 7/13/18 with 5-10% of marrow involved and no myeloid disorders  -pan ct June 2019  With no evidence of adenopathy  - no current symptoms

## 2025-01-24 ENCOUNTER — TELEPHONE (OUTPATIENT)
Dept: CARDIOLOGY | Facility: CLINIC | Age: 75
End: 2025-01-24
Payer: MEDICARE

## 2025-01-24 NOTE — TELEPHONE ENCOUNTER
----- Message from Nedra sent at 1/24/2025  1:10 PM CST -----  Type:  Sooner Apoointment Request    Caller is requesting a sooner appointment.  Caller declined first available appointment listed below.  Caller will not accept being placed on the waitlist and is requesting a message be sent to doctor.  Name of Caller: Pt  When is the first available appointment?  Symptoms: f/u  Would the patient rather a call back or a response via [facility]? Call  Best Call Back Number:  [phone]  Additional Information: Pt would like to speak to nurse in office regarding an appt

## 2025-01-24 NOTE — TELEPHONE ENCOUNTER
I  reached out  and spoke to Mrs. Messina,    And we schedule her   an appointment to See f.    Appointment also mailed to patient home.    Patient confirmed appointment too.    Patient also on the waiting list to be seen sooner.      appreciates the call back.      Silva Daley (rita).

## 2025-01-29 RX ORDER — MIRTAZAPINE 30 MG/1
30 TABLET, FILM COATED ORAL NIGHTLY
Qty: 90 TABLET | Refills: 1 | Status: SHIPPED | OUTPATIENT
Start: 2025-01-29

## 2025-01-29 NOTE — TELEPHONE ENCOUNTER
Care Due:                  Date            Visit Type   Department     Provider  --------------------------------------------------------------------------------                                EP -                              PRIMARY      [facility] FAMILY  Last Visit: 01-      CARE ([facility])   MEDICINE       Reji Rosas                              EP -                              PRIMARY      [facility] FAM[facility]  Next Visit: 06-      CARE ([facility])   MEDICINE       Deric Claudio                                                            Last  Test          Frequency    Reason                     Performed    Due Date  --------------------------------------------------------------------------------    Lipid Panel.  12 months..  atorvastatin.............  03- 03-    [facility] Embedded Care Due Messages. Reference number: [id number].   1/29/2025 1:52:52 AM CST

## 2025-01-29 NOTE — TELEPHONE ENCOUNTER
Provider Staff:  Action required for this patient    Requires labs      Please see care gap opportunities below in Care Due Message.    Thanks!  [facility]     Appointments      Date Provider   Last Visit   6/18/2024 Deric Claudio MD   Next Visit   6/24/2025 Deric Claudio MD     Refill Decision Note   Rodolfo Messina  is requesting a refill authorization.  Brief Assessment and Rationale for Refill:  Approve     Medication Therapy Plan:         Comments:     Note composed:4:45 AM 01/29/2025

## 2025-02-07 RX ORDER — AMLODIPINE BESYLATE 10 MG/1
10 TABLET ORAL DAILY
Qty: 90 TABLET | Refills: 3 | Status: SHIPPED | OUTPATIENT
Start: 2025-02-07

## 2025-02-21 DIAGNOSIS — Z00.00 ENCOUNTER FOR MEDICARE ANNUAL WELLNESS EXAM: ICD-10-CM

## 2025-03-23 ENCOUNTER — HOSPITAL ENCOUNTER (EMERGENCY)
Facility: HOSPITAL | Age: 75
Discharge: HOME | End: 2025-03-23
Attending: STUDENT IN AN ORGANIZED HEALTH CARE EDUCATION/TRAINING PROGRAM
Payer: MEDICARE

## 2025-03-23 VITALS
OXYGEN SATURATION: 100 % | HEART RATE: 72 BPM | SYSTOLIC BLOOD PRESSURE: 171 MMHG | WEIGHT: 167 LBS | BODY MASS INDEX: 23.91 KG/M2 | HEIGHT: 70 IN | RESPIRATION RATE: 17 BRPM | DIASTOLIC BLOOD PRESSURE: 81 MMHG | TEMPERATURE: 98 F

## 2025-03-23 DIAGNOSIS — D69.6 THROMBOCYTOPENIA: ICD-10-CM

## 2025-03-23 DIAGNOSIS — R42 DIZZINESS: Primary | ICD-10-CM

## 2025-03-23 DIAGNOSIS — R11.2 NAUSEA AND VOMITING, UNSPECIFIED VOMITING TYPE: ICD-10-CM

## 2025-03-23 LAB
ABSOLUTE EOSINOPHIL (OHS): 0.06 K/UL
ABSOLUTE MONOCYTE (OHS): 0.3 K/UL (ref 0.3–1)
ABSOLUTE NEUTROPHIL COUNT (OHS): 2.98 K/UL (ref 1.8–7.7)
ALBUMIN SERPL BCP-MCNC: 4 G/DL (ref 3.5–5.2)
ALP SERPL-CCNC: 61 UNIT/L (ref 40–150)
ALT SERPL W/O P-5'-P-CCNC: 17 UNIT/L (ref 10–44)
ANION GAP (OHS): 8 MMOL/L (ref 8–16)
AST SERPL-CCNC: 27 UNIT/L (ref 11–45)
BASOPHILS # BLD AUTO: 0.02 K/UL
BASOPHILS NFR BLD AUTO: 0.5 %
BILIRUB SERPL-MCNC: 0.8 MG/DL (ref 0.1–1)
BUN SERPL-MCNC: 22 MG/DL (ref 6–30)
BUN SERPL-MCNC: 23 MG/DL (ref 8–23)
CALCIUM SERPL-MCNC: 9.1 MG/DL (ref 8.7–10.5)
CHLORIDE SERPL-SCNC: 110 MMOL/L (ref 95–110)
CHLORIDE SERPL-SCNC: 114 MMOL/L (ref 95–110)
CO2 SERPL-SCNC: 20 MMOL/L (ref 23–29)
CREAT SERPL-MCNC: 1.2 MG/DL (ref 0.5–1.4)
CREAT SERPL-MCNC: 1.4 MG/DL (ref 0.5–1.4)
ERYTHROCYTE [DISTWIDTH] IN BLOOD BY AUTOMATED COUNT: 13.7 % (ref 11.5–14.5)
GFR SERPLBLD CREATININE-BSD FMLA CKD-EPI: >60 ML/MIN/1.73/M2
GLUCOSE SERPL-MCNC: 102 MG/DL (ref 70–110)
GLUCOSE SERPL-MCNC: 104 MG/DL (ref 70–110)
HCT VFR BLD AUTO: 35.2 % (ref 40–54)
HCT VFR BLD CALC: 31 %PCV (ref 36–54)
HCV AB SERPL QL IA: NORMAL
HGB BLD-MCNC: 11.6 GM/DL (ref 14–18)
HIV 1+2 AB+HIV1 P24 AG SERPL QL IA: NORMAL
IMM GRANULOCYTES # BLD AUTO: 0.01 K/UL (ref 0–0.04)
IMM GRANULOCYTES NFR BLD AUTO: 0.3 % (ref 0–0.5)
LYMPHOCYTES # BLD AUTO: 0.52 K/UL (ref 1–4.8)
MCH RBC QN AUTO: 32.2 PG (ref 27–50)
MCHC RBC AUTO-ENTMCNC: 33 G/DL (ref 32–36)
MCV RBC AUTO: 98 FL (ref 82–98)
NUCLEATED RBC (/100WBC) (OHS): 0 /100 WBC
PLATELET # BLD AUTO: 84 K/UL (ref 150–450)
PMV BLD AUTO: 12.5 FL (ref 9.2–12.9)
POC IONIZED CALCIUM: 1.24 MMOL/L (ref 1.06–1.42)
POC TCO2 (MEASURED): 22 MMOL/L (ref 23–29)
POTASSIUM BLD-SCNC: 4.5 MMOL/L (ref 3.5–5.1)
POTASSIUM SERPL-SCNC: 5.5 MMOL/L (ref 3.5–5.1)
PROT SERPL-MCNC: 7 GM/DL (ref 6–8.4)
RBC # BLD AUTO: 3.6 M/UL (ref 4.6–6.2)
RELATIVE EOSINOPHIL (OHS): 1.5 %
RELATIVE LYMPHOCYTE (OHS): 13.4 % (ref 18–48)
RELATIVE MONOCYTE (OHS): 7.7 % (ref 4–15)
RELATIVE NEUTROPHIL (OHS): 76.6 % (ref 38–73)
SAMPLE: ABNORMAL
SODIUM BLD-SCNC: 143 MMOL/L (ref 136–145)
SODIUM SERPL-SCNC: 142 MMOL/L (ref 136–145)
TROPONIN I SERPL HS-MCNC: 11 NG/L
TROPONIN I SERPL HS-MCNC: 8 NG/L
WBC # BLD AUTO: 3.89 K/UL (ref 3.9–12.7)

## 2025-03-23 PROCEDURE — 93005 ELECTROCARDIOGRAM TRACING: CPT | Mod: HCNC

## 2025-03-23 PROCEDURE — 80053 COMPREHEN METABOLIC PANEL: CPT | Mod: HCNC | Performed by: STUDENT IN AN ORGANIZED HEALTH CARE EDUCATION/TRAINING PROGRAM

## 2025-03-23 PROCEDURE — 87389 HIV-1 AG W/HIV-1&-2 AB AG IA: CPT | Mod: HCNC | Performed by: PHYSICIAN ASSISTANT

## 2025-03-23 PROCEDURE — 80047 BASIC METABLC PNL IONIZED CA: CPT | Mod: HCNC

## 2025-03-23 PROCEDURE — 86803 HEPATITIS C AB TEST: CPT | Mod: HCNC | Performed by: PHYSICIAN ASSISTANT

## 2025-03-23 PROCEDURE — 82330 ASSAY OF CALCIUM: CPT | Mod: 59

## 2025-03-23 PROCEDURE — 84484 ASSAY OF TROPONIN QUANT: CPT | Mod: HCNC | Performed by: STUDENT IN AN ORGANIZED HEALTH CARE EDUCATION/TRAINING PROGRAM

## 2025-03-23 PROCEDURE — 85025 COMPLETE CBC W/AUTO DIFF WBC: CPT | Mod: HCNC | Performed by: STUDENT IN AN ORGANIZED HEALTH CARE EDUCATION/TRAINING PROGRAM

## 2025-03-23 PROCEDURE — 99285 EMERGENCY DEPT VISIT HI MDM: CPT | Mod: 25,HCNC

## 2025-03-23 PROCEDURE — 93010 ELECTROCARDIOGRAM REPORT: CPT | Mod: HCNC,,, | Performed by: INTERNAL MEDICINE

## 2025-03-23 PROCEDURE — 25000003 PHARM REV CODE 250: Mod: HCNC | Performed by: STUDENT IN AN ORGANIZED HEALTH CARE EDUCATION/TRAINING PROGRAM

## 2025-03-23 RX ORDER — MECLIZINE HCL 12.5 MG 12.5 MG/1
25 TABLET ORAL
Status: COMPLETED | OUTPATIENT
Start: 2025-03-23 | End: 2025-03-23

## 2025-03-23 RX ORDER — MECLIZINE HYDROCHLORIDE 25 MG/1
25 TABLET ORAL 3 TIMES DAILY PRN
Qty: 20 TABLET | Refills: 0 | Status: SHIPPED | OUTPATIENT
Start: 2025-03-23

## 2025-03-23 RX ADMIN — MECLIZINE HYDROCHLORIDE 25 MG: 12.5 TABLET ORAL at 11:03

## 2025-03-23 NOTE — ED PROVIDER NOTES
Encounter Date: 3/23/2025       History     Chief Complaint   Patient presents with    Dizziness     EMS from home with complaints of dizziness, nausea, vomiting, generalized weakness since yesterday.      74 y.o. male with anxiety, coronary artery disease, bioprosthetic mitral valve, history of AFib, history of CABG presents for dizziness since yesterday.  Starting yesterday, patient has dizziness that got worse when he laid flat.  However, for the past 5-6 hours he reports persistent dizziness.  He also reports associated nausea/vomiting.  He denies any associated abdominal pain.  He reports difficulty ambulating which is new.  He denies any headache, vision changes, chest pain, palpitations, shortness of breath.    The history is provided by the patient and medical records.     Review of patient's allergies indicates:   Allergen Reactions    Metoprolol Diarrhea    Shellfish containing products     Augmentin [amoxicillin-pot clavulanate]      Patient felt that it raised BP and requested to have it added as intolerance. Tolerated unasyn and ampicillin.    Ciprofloxacin     Clindamycin Rash    Flagyl [metronidazole]     Lisinopril Other (See Comments)     cough    Mysoline [primidone]     Omnicef [cefdinir]      Past Medical History:   Diagnosis Date    ANNA (acute kidney injury) 2/22/2018    Anemia     Anxiety     BPH (benign prostatic hyperplasia)     Coarse tremors     Coronary artery disease of native artery of native heart with stable angina pectoris 5/21/2018    Diverticulosis     Encounter for blood transfusion     Endocarditis     Mechanical heart valve present     New onset atrial fibrillation 3/20/2024    Non Hodgkin's lymphoma 8/2/2018    Urinary retention      Past Surgical History:   Procedure Laterality Date    ANTERIOR CERVICAL DISCECTOMY N/A 10/16/2019    Procedure: DISCECTOMY, SPINE, CERVICAL, ANTERIOR C6/7 ACDF;  Surgeon: Jay Reyes MD;  Location: [facility] OR [address] [place] [address];  Service: Neurosurgery;   Laterality: N/A;    APPENDECTOMY      COLONOSCOPY      COLONOSCOPY N/A 2/1/2018    Procedure: COLONOSCOPY;  Surgeon: Richar Payan MD;  Location: [facility] ENDO (4TH FLR);  Service: Endoscopy;  Laterality: N/A;  PM prep    CORONARY ARTERY BYPASS GRAFT (CABG) N/A 5/25/2018    Procedure: AORTOCORONARY BYPASS-CABG;  Surgeon: Marvin Go MD;  [facility];  Service: Cardiovascular;  Laterality: N/A;    ECHOCARDIOGRAM,TRANSESOPHAGEAL N/A 5/7/2024    Procedure: Transesophageal echo (SOWMYA) intra-procedure log documentation;  Surgeon: Nicolás Escobar III, MD;  Location: [facility] EP LAB;  Service: Cardiology;  Laterality: N/A;  AF, SOWMYA/DCCV, MAC, [place], 3prep    EYE SURGERY Right     cataract extraction    FINGER SURGERY      FRACTURE SURGERY Right     index finger    MITRAL VALVE REPLACEMENT N/A 5/25/2018    Procedure: Mitral Valve Replacement;  Surgeon: Marvin Go MD;  Location: [facility] OR [address] [place] [address];  Service: Cardiovascular;  Laterality: N/A;    TONSILLECTOMY      TREATMENT OF CARDIAC ARRHYTHMIA N/A 5/7/2024    Procedure: Cardioversion or Defibrillation;  Surgeon: Rnony Kwong MD;  Location: [facility] EP LAB;  Service: Cardiology;  Laterality: N/A;  AF, SOWMYA/DCCV, MAC, [place], 3prep     Family History   Problem Relation Name Age of Onset    Hypertension Mother      Stroke Mother      Heart disease Mother          CABG    Coronary artery disease Mother      Coronary artery disease Father      Heart disease Father          CABG    No Known Problems Sister x1     No Known Problems Brother x1     Melanoma Neg Hx       Social History[1]  Review of Systems   Reason unable to perform ROS: See HPI for relevant ROS.       Physical Exam     Initial Vitals [03/23/25 1037]   BP Pulse Resp Temp SpO2   (!) 160/82 84 16 97.8 °F (36.6 °C) 97 %      MAP       --         Physical Exam    Nursing note and vitals reviewed.  Constitutional:   Alert, speaking full sentences, no acute distress   Eyes: Conjunctivae are normal. No scleral  icterus.   Cardiovascular:  Normal rate, regular rhythm and intact distal pulses.           Pulmonary/Chest: Breath sounds normal. No respiratory distress.   Abdominal: Abdomen is soft. He exhibits no distension. There is no abdominal tenderness.   Musculoskeletal:         General: No tenderness or edema.     Neurological:   LOC: Alert  Attention Span: Normal   Language: No aphasia  Articulation: No dysarthria  Orientation: Person, Place, Time   EOM (CN III, IV, VI): Full/intact  Pupils (CN II, III): PERRL  Facial Sensation (CN V): Normal  Facial Movement (CN VII): Symmetric facial expression    Motor: Arm left  5/5  Leg left  5/5  Arm right  5/5  Leg right 5/5  Cerebellum:  Intention tremor versus ataxia of the upper extremities.  Requires assistance for gait  Sensation: Intact to light touch   Skin: Skin is warm and dry.         ED Course   Procedures  Labs Reviewed   COMPREHENSIVE METABOLIC PANEL - Abnormal       Result Value    Sodium 142      Potassium 5.5 (*)     Chloride 114 (*)     CO2 20 (*)     Glucose 104      BUN 23      Creatinine 1.2      Calcium 9.1      Protein Total 7.0      Albumin 4.0      Bilirubin Total 0.8      ALP 61      AST 27      ALT 17      Anion Gap 8      eGFR >60     CBC WITH DIFFERENTIAL - Abnormal    WBC 3.89 (*)     RBC 3.60 (*)     HGB 11.6 (*)     HCT 35.2 (*)     MCV 98      MCH 32.2      MCHC 33.0      RDW 13.7      Platelet Count 84 (*)     MPV 12.5      Nucleated RBC 0      Neut % 76.6 (*)     Lymph % 13.4 (*)     Mono % 7.7      Eos % 1.5      Basophil % 0.5      Imm Grans % 0.3      Neut # 2.98      Lymph # 0.52 (*)     Mono # 0.30      Eos # 0.06      Baso # 0.02      Imm Grans # 0.01     ISTAT PROCEDURE - Abnormal    POC Glucose 102      POC BUN 22      POC Creatinine 1.4      POC Sodium 143      POC Potassium 4.5      POC Chloride 110      POC TCO2 (MEASURED) 22 (*)     POC Ionized Calcium 1.24      POC Hematocrit 31 (*)     Sample LEXY     HEPATITIS C ANTIBODY - Normal     Hep C Ab Interp Non-Reactive     HIV 1 / 2 ANTIBODY - Normal    HIV 1/2 Ag/Ab Non-Reactive     TROPONIN I HIGH SENSITIVITY - Normal    Troponin High Sensitive 11     TROPONIN I HIGH SENSITIVITY - Normal    Troponin High Sensitive 8     CBC W/ AUTO DIFFERENTIAL    Narrative:     The following orders were created for panel order CBC auto differential.  Procedure                               Abnormality         Status                     ---------                               -----------         ------                     CBC with Differential[[id number]]       Abnormal            Final result                 Please view results for these tests on the individual orders.          Imaging Results              MRI Brain Without Contrast (Final result)  Result time 03/23/25 14:18:10      Final result by Miguel Brewer MD (03/23/25 14:18:10)                   Impression:      No acute intracranial process.      Electronically signed by: Miguel Brewer  Date:    03/23/2025  Time:    14:18               Narrative:    EXAMINATION:  MRI BRAIN WITHOUT CONTRAST    CLINICAL HISTORY:  Neuro deficit, acute, stroke suspected;Dizziness, non-specific;Ataxia, gait difficulty;    TECHNIQUE:  Multiplanar multisequence MR imaging of the brain was performed without contrast.    COMPARISON:  CT 10/14/2019    FINDINGS:  No evidence of hydrocephalus, mass effect, intracranial hemorrhage or acute infarct.    Tiny chronic right cerebellar infarct.  No advanced ischemic changes in the white matter.  Few scattered foci of punctate chronic hemosiderin deposition predominately within the subcortical white matter but also cerebellum may reflect an element of amyloid angiopathy.    Normal arterial flow voids are preserved at the skull base.    The visualized paranasal sinuses are clear with mild mastoid mucosal thickening bilaterally                                       Medications   meclizine tablet 25 mg (25 mg Oral Given 3/23/25 1130)      Medical Decision Making  74 y.o. male with anxiety, coronary artery disease, bioprosthetic mitral valve, history of AFib, history of CABG presents for dizziness since yesterday.  Differentials include peripheral vertigo, electrolyte derangement, symptomatic AFib, arrhythmia, central vertigo, less likely atypical ACS  Patient presenting with dizziness and generalized weakness since yesterday.  Does have some gait difficulty.  He has a an intention tremor on exam, difficult to assess for upper limb ataxia.  Presentation more consistent with peripheral vertigo but difficult to exclude central vertigo, MRI was ordered  Patient has extensive cardiac history, denies any chest pain, palpitations, or shortness of breath.  He is hemodynamically stable  EKG shows sinus rhythm with PVC, no STEMI or acute ischemia    Amount and/or Complexity of Data Reviewed  Labs: ordered. Decision-making details documented in ED Course.  Radiology: ordered.  ECG/medicine tests:  Decision-making details documented in ED Course.               ED Course as of 03/23/25 1815   Sun Mar 23, 2025   1122 EKG 12-lead  Sinus rhythm, regular, narrow complex, rate of 73, no STEMI, no ST deviations, PVCs are present, no significant change compared to prior  [OK]   1217 Comprehensive metabolic panel(!)  Mild hyperkalemia, suspect lab error in context of normal kidney function and normal EKG [OK]   1256 POC Potassium: 4.5 [OK]   1257 CBC auto differential(!)  Marginal leukopenia, mild anemia, and thrombocytopenia slightly worse compared to baseline [OK]   1420 Troponin I High Sensitivity: 8 [OK]   1507 Patient ambulated around the ED without assistance.  MRI unremarkable.  Will discharge with close follow-up with his PCP and ENT, return precautions given [OK]      ED Course User Index  [OK] Johnson Mcfarland MD                           Clinical Impression:  Final diagnoses:  [R42] Dizziness (Primary)  [D69.6] Thrombocytopenia  [R11.2] Nausea and  vomiting, unspecified vomiting type          ED Disposition Condition    Discharge Stable          ED Prescriptions       Medication Sig Dispense Start Date End Date Auth. Provider    meclizine (ANTIVERT) 25 mg tablet Take 1 tablet (25 mg total) by mouth 3 (three) times daily as needed for Dizziness or Nausea. 20 tablet 3/23/2025 -- Johnson Mcfarland MD          Follow-up Information       Follow up With Specialties Details Why Contact Info Additional Information    Deric Claudio MD Family Medicine Schedule an appointment as soon as possible for a visit   [address] [facility] [address]  [phone]       [facility] Otolaryngology Schedule an appointment as soon as possible for a visit   [address] [facility] [address] [phone] Ear, Nose & Throat Services - Main Building, 4th Floor Please park in [facility] and use Clinic elevator               [1]   Social History  Tobacco Use    Smoking status: Never     Passive exposure: Never    Smokeless tobacco: Never   Substance Use Topics    Alcohol use: Yes     Comment: social    Drug use: No        Johnson Mcfarland MD  03/23/25 7852

## 2025-03-23 NOTE — ED NOTES
I-STAT Chem-8+ Results:   Value Reference Range   Sodium 143 136-145 mmol/L   Potassium  4.5 3.5-5.1 mmol/L   Chloride 110  mmol/L   Ionized Calcium 1.24 1.06-1.42 mmol/L   CO2 (measured) 22 23-29 mmol/L   Glucose 102  mg/dL   BUN 22 6-30 mg/dL   Creatinine 1.4 0.5-1.4 mg/dL   Hematocrit 31 36-54%

## 2025-03-23 NOTE — ED TRIAGE NOTES
Rodolfo Messina, an 74 y.o. male presents to the ED via EMS c/o dizziness, NV, weakness x1 day. Denies CP, SOB, abd pain, diarrhea, HA, fevers, chills. Hx of Afib, CABG, CAD.      Chief Complaint   Patient presents with    Dizziness     EMS from home with complaints of dizziness, nausea, vomiting, generalized weakness since yesterday.      Review of patient's allergies indicates:   Allergen Reactions    Metoprolol Diarrhea    Shellfish containing products     Augmentin [amoxicillin-pot clavulanate]      Patient felt that it raised BP and requested to have it added as intolerance. Tolerated unasyn and ampicillin.    Ciprofloxacin     Clindamycin Rash    Flagyl [metronidazole]     Lisinopril Other (See Comments)     cough    Mysoline [primidone]     Omnicef [cefdinir]      Past Medical History:   Diagnosis Date    ANNA (acute kidney injury) 2/22/2018    Anemia     Anxiety     BPH (benign prostatic hyperplasia)     Coarse tremors     Coronary artery disease of native artery of native heart with stable angina pectoris 5/21/2018    Diverticulosis     Encounter for blood transfusion     Endocarditis     Mechanical heart valve present     New onset atrial fibrillation 3/20/2024    Non Hodgkin's lymphoma 8/2/2018    Urinary retention

## 2025-03-24 ENCOUNTER — TELEPHONE (OUTPATIENT)
Dept: FAMILY MEDICINE | Facility: CLINIC | Age: 75
End: 2025-03-24
Payer: MEDICARE

## 2025-03-24 ENCOUNTER — PATIENT OUTREACH (OUTPATIENT)
Facility: OTHER | Age: 75
End: 2025-03-24
Payer: MEDICARE

## 2025-03-24 DIAGNOSIS — R42 VERTIGO: Primary | ICD-10-CM

## 2025-03-24 LAB
OHS QRS DURATION: 100 MS
OHS QTC CALCULATION: 447 MS

## 2025-03-24 NOTE — TELEPHONE ENCOUNTER
----- Message from Nancie sent at 3/24/2025 12:29 PM CDT -----  Regarding: Call back  Contact: [phone]  Type:  Patient Requesting Referral    Who Called: PT     Does the patient already have the specialty appointment scheduled?: Yes     If yes, what is the date of that appointment?: 3/25/25    Referral to What Specialty: ENT Reason for Referral: Vertigo    Does the patient want the referral with a specific physician?: Dr. Deandre Arechiga    Is the specialist an [facility]?: NO    Patient Requesting a Response?: YES     Would the patient rather a call back or a response via [facility]? Call Back     Best Call Back Number: [phone]    Additional Information: fax referral to  [phone]

## 2025-03-24 NOTE — TELEPHONE ENCOUNTER
----- Message from Jolene sent at 3/24/2025 11:31 AM CDT -----  Type:  Sooner Appointment RequestCaller is requesting a sooner appointment.  Caller declined first available appointment listed below.  Caller will not accept being placed on the waitlist and is requesting a message be sent to doctor.Name of Caller: Pt's wife When is the first available appointment? Private schedule for appt slot Symptoms: Hospital F/U Would the patient rather a call back or a response via [facility]? Call back Best Call Back Number: [phone] Information: Please be advised, caller states that pt cannot do tomorrow as he has another appt scheduled w/ ENT

## 2025-03-24 NOTE — PROGRESS NOTES
ED navigator was unable to reach patient for post ED navigation after 2 attempts. Patient does have a 7 day follow-up appointment. Encounter closed.

## 2025-03-31 ENCOUNTER — OFFICE VISIT (OUTPATIENT)
Dept: FAMILY MEDICINE | Facility: CLINIC | Age: 75
End: 2025-03-31
Payer: MEDICARE

## 2025-03-31 ENCOUNTER — TELEPHONE (OUTPATIENT)
Dept: FAMILY MEDICINE | Facility: CLINIC | Age: 75
End: 2025-03-31

## 2025-03-31 VITALS
BODY MASS INDEX: 23.68 KG/M2 | TEMPERATURE: 98 F | HEIGHT: 70 IN | OXYGEN SATURATION: 95 % | SYSTOLIC BLOOD PRESSURE: 120 MMHG | WEIGHT: 165.44 LBS | DIASTOLIC BLOOD PRESSURE: 60 MMHG | HEART RATE: 80 BPM

## 2025-03-31 DIAGNOSIS — N18.31 STAGE 3A CHRONIC KIDNEY DISEASE: ICD-10-CM

## 2025-03-31 DIAGNOSIS — I10 PRIMARY HYPERTENSION: Chronic | ICD-10-CM

## 2025-03-31 DIAGNOSIS — D69.6 THROMBOCYTOPENIA, UNSPECIFIED: ICD-10-CM

## 2025-03-31 DIAGNOSIS — C85.10 B-CELL LYMPHOMA, UNSPECIFIED B-CELL LYMPHOMA TYPE, UNSPECIFIED BODY REGION: ICD-10-CM

## 2025-03-31 DIAGNOSIS — R42 VERTIGO: Primary | ICD-10-CM

## 2025-03-31 PROCEDURE — 3008F BODY MASS INDEX DOCD: CPT | Mod: CPTII,S$GLB,, | Performed by: FAMILY MEDICINE

## 2025-03-31 PROCEDURE — 3288F FALL RISK ASSESSMENT DOCD: CPT | Mod: CPTII,S$GLB,, | Performed by: FAMILY MEDICINE

## 2025-03-31 PROCEDURE — 99214 OFFICE O/P EST MOD 30 MIN: CPT | Mod: S$GLB,,, | Performed by: FAMILY MEDICINE

## 2025-03-31 PROCEDURE — 1101F PT FALLS ASSESS-DOCD LE1/YR: CPT | Mod: CPTII,S$GLB,, | Performed by: FAMILY MEDICINE

## 2025-03-31 PROCEDURE — G2211 COMPLEX E/M VISIT ADD ON: HCPCS | Mod: S$GLB,,, | Performed by: FAMILY MEDICINE

## 2025-03-31 PROCEDURE — 3074F SYST BP LT 130 MM HG: CPT | Mod: CPTII,S$GLB,, | Performed by: FAMILY MEDICINE

## 2025-03-31 PROCEDURE — 1160F RVW MEDS BY RX/DR IN RCRD: CPT | Mod: CPTII,S$GLB,, | Performed by: FAMILY MEDICINE

## 2025-03-31 PROCEDURE — 1126F AMNT PAIN NOTED NONE PRSNT: CPT | Mod: CPTII,S$GLB,, | Performed by: FAMILY MEDICINE

## 2025-03-31 PROCEDURE — 3078F DIAST BP <80 MM HG: CPT | Mod: CPTII,S$GLB,, | Performed by: FAMILY MEDICINE

## 2025-03-31 PROCEDURE — 1159F MED LIST DOCD IN RCRD: CPT | Mod: CPTII,S$GLB,, | Performed by: FAMILY MEDICINE

## 2025-03-31 RX ORDER — KETOCONAZOLE 20 MG/ML
SHAMPOO, SUSPENSION TOPICAL
COMMUNITY

## 2025-03-31 RX ORDER — METHOCARBAMOL 500 MG/1
500 TABLET, FILM COATED ORAL DAILY
COMMUNITY

## 2025-03-31 RX ORDER — GABAPENTIN 100 MG/1
100 CAPSULE ORAL DAILY
COMMUNITY

## 2025-03-31 RX ORDER — ASPIRIN 81 MG/1
81 TABLET ORAL 2 TIMES DAILY
COMMUNITY

## 2025-03-31 NOTE — PROGRESS NOTES
" Patient ID: Rodolfo Messina is a 74 y.o. male.    Chief Complaint: Hospital Follow Up and Dizziness    HPI       Rodolfo Messina is a 74 y.o. male    History of Present Illness    CHIEF COMPLAINT:  Patient presents today for follow up of recent episode of nausea, vomiting, and dizziness.    VERTIGO:  He experienced an episode of vertigo last Sunday with associated nausea, vomiting, and dizziness. His wife called an ambulance due to concern about possible cardiac involvement. EKG was normal at hospital. His symptoms are improving and he denies current nausea.    HEMATOLOGIC HISTORY:  He has a history of blood disorders, including low platelets. Previous concerns about lymphoma were ruled out after evaluation. He previously saw a hematologist periodically but discontinued follow-up as findings remained consistent. Though there was an agreement for annual labs, he has not followed through.    MUSCULOSKELETAL:  He reports pain in the top part of his foot.    MEDICATIONS:  He does not take Eliquis due to history of medication reactions and financial constraints.       Having cataract surgery needs clearance.    Review of Symptoms        Physical Exam    Vitals:    03/31/25 1001   BP: 120/60   BP Location: Left arm   Patient Position: Sitting   Pulse: 80   Temp: 98.3 °F (36.8 °C)   TempSrc: Oral   SpO2: 95%   Weight: 75 kg (165 lb 7.3 oz)   Height: 5' 10" (1.778 m)        Constitutional:   Oriented to person, place, and time.appears well-developed and well-nourished.   No distress.     HENT:   Head: Normocephalic and atraumatic.     Right Ear:  External ear normal-hearing aid in place   Left Ear:  External ear normal hearing it in place    Nose: External Normal. Normal turbinates, No rhinorrhea     Mouth/Throat: Uvula is midline, oropharynx is clear and moist and mucous membranes are normal.     Neck: supple no anterior cervical adenopathy          Eyes:   Conjunctivae are normal. Right eye exhibits no discharge. " Left eye exhibits no discharge. No scleral icterus. No periorbital edema     Cardiovascular:  Irregular     Pulmonary/Chest:   Clear to auscultation bilaterally without wheezes, rhonchi or rales    Musculoskeletal:  No edema. No obvious deformity No wasting    Right great toe-no edema no erythema pain with extension of right great toe     Neurological:   Alert and oriented to person, place, and time. Coordination normal.     Skin:   Skin is warm and dry.   No diaphoresis.   No rash noted.    Psychiatric: Normal mood and affect. Behavior is normal. Judgment and thought content normal.     Physical Exam    MSK: Foot/Ankle - Right: Pain in top of foot.         Assessment / Plan:      ICD-10-CM ICD-9-CM   1. Vertigo  R42 780.4   2. Stage 3a chronic kidney disease  N18.31 585.3   3. Primary hypertension  I10 401.9   4. B-cell lymphoma, unspecified B-cell lymphoma type, unspecified body region  C85.10 202.80   5. Thrombocytopenia, unspecified  D69.6 287.5     Vertigo    Stage 3a chronic kidney disease    Primary hypertension    B-cell lymphoma, unspecified B-cell lymphoma type, unspecified body region  -     Comprehensive Metabolic Panel; Future; Expected date: 03/31/2025  -     CBC Auto Differential; Future; Expected date: 03/31/2025    Thrombocytopenia, unspecified  -     Comprehensive Metabolic Panel; Future; Expected date: 03/31/2025  -     CBC Auto Differential; Future; Expected date: 03/31/2025        Assessment & Plan    - Assessed recent episode of nausea, vomiting, and dizziness, initially concerning for cardiac issues but ruled out by EKG at the hospital.  - Vertigo is resolving, though some symptoms persist.  - Platelet count is lower than before but stable.  - History of low blood production and previous hematology follow-ups.  - Foot pain likely arthritis in the joint rather than gout.    BENIGN PAROXYSMAL VERTIGO:  - Noted that the patient experienced nausea, vomiting, and dizziness last Sunday, with  symptoms improving to only slight dizziness remaining.  - Performed physical exam and observed improved balance.  - Ordered MRI of the head to rule out stroke or other brain issues.  - Referred the patient to an ENT specialist for further evaluation.  - Prescribed vertigo exercises and instructed the patient to continue performing them.  - Advised the patient to report any recurrence or worsening of symptoms.    THROMBOCYTOPENIA:  - Noted that the patient's platelet count is lower than before but stable.  - Explained the difference between aspirin's effect on platelets vs. blood clotting factors.  - Clarified that eye surgery should not be affected by aspirin use due to lack of blood flow in the eye.  - Ordered repeat complete blood count in 1 month to monitor platelet levels.  - Instructed the patient to continue current medication regimen, including aspirin.    RIGHT FOOT PAIN:  - Evaluated the patient's right foot pain, localized to the top part of the foot.  - Performed physical exam of the foot and assessed the pain.  - Suspected arthritis in the joint rather than gout as the cause of pain.  - Recommend using shoes with good arch support to keep the toe elevated and alleviate foot pain.  - Advised the patient to monitor pain levels and report any significant changes or worsening of symptoms.    DRUG ALLERGIES:  - Noted the patient's history of reactions to multiple medications.  - Advised the patient to maintain an updated list of all known allergies and reactions.  - Instructed the patient to inform all healthcare providers about these allergies before receiving any new medications.    MEDICATION UNDERDOSING:  - Discussed the patient's intentional discontinuation of Eseralto due to cost and medication reactions.  - Noted that the patient increased intake of another medication to compensate for not taking Eseralto.  - Advised against self-adjusting medication dosages without consulting a healthcare provider.  -  Recommend exploring alternative medication options or financial assistance programs to ensure proper treatment adherence.    FOLLOW-UP:  - Scheduled a follow-up visit to reassess medication regimen and address any ongoing concerns.  - None Follow up in about 6 months.       Fill it out clearance for cataract surgery.    Regarding right foot/toe pain-appears that extension is causing the pain-suggest shoes with a higher arch      This note was generated with the assistance of ambient listening technology. Verbal consent was obtained by the patient and accompanying visitor(s) for the recording of patient appointment to facilitate this note. I attest to having reviewed and edited the generated note for accuracy, though some syntax or spelling errors may persist. Please contact the author of this note for any clarification.

## 2025-03-31 NOTE — TELEPHONE ENCOUNTER
You are correct in knowing that I wanted a CBC and not a complete metabolic panel.    After looking at the chart his potassium is elevated so I want to keep the complete metabolic panel.  Please add a CBC to the linked complete metabolic panel which should be done in about a month.

## 2025-03-31 NOTE — TELEPHONE ENCOUNTER
I would like to clarify the lab you want repeated in 1 month. You verbally said blood count, but when I went in to discharge pt I saw that a CMP was ordered. Do you want a CBC also?

## 2025-04-21 RX ORDER — MIRTAZAPINE 30 MG/1
30 TABLET, FILM COATED ORAL NIGHTLY
Qty: 90 TABLET | Refills: 3 | Status: SHIPPED | OUTPATIENT
Start: 2025-04-21

## 2025-04-21 NOTE — TELEPHONE ENCOUNTER
Care Due:                  Date            Visit Type   Department     Provider  --------------------------------------------------------------------------------                                EP -                              PRIMARY      [facility] FAMILY  Last Visit: 03-      CARE ([facility])   MEDICINE       Deric Claudio                              EP -                              PRIMARY      [facility] FAMILY  Next Visit: 10-      CARE ([facility])   MEDICINE       Deric Claudio                                                            Last  Test          Frequency    Reason                     Performed    Due Date  --------------------------------------------------------------------------------    Lipid Panel.  12 months..  atorvastatin.............  03- 03-    [facility] Embedded Care Due Messages. Reference number: [id number].   4/21/2025 1:44:16 AM CDT

## 2025-04-22 NOTE — TELEPHONE ENCOUNTER
Provider Staff:  Action required for this patient    Requires labs      Please see care gap opportunities below in Care Due Message.    Thanks!  [facility]     Appointments      Date Provider   Last Visit   3/31/2025 Deric Claudio MD   Next Visit   10/1/2025 Deric Claudio MD     Refill Decision Note   Rodolfo Messina  is requesting a refill authorization.  Brief Assessment and Rationale for Refill:  Approve     Medication Therapy Plan:        Comments:     Note composed:7:05 PM 04/21/2025

## 2025-05-07 ENCOUNTER — LAB VISIT (OUTPATIENT)
Dept: LAB | Facility: HOSPITAL | Age: 75
End: 2025-05-07
Attending: FAMILY MEDICINE
Payer: MEDICARE

## 2025-05-07 DIAGNOSIS — N18.31 STAGE 3A CHRONIC KIDNEY DISEASE: ICD-10-CM

## 2025-05-07 DIAGNOSIS — D69.6 THROMBOCYTOPENIA, UNSPECIFIED: ICD-10-CM

## 2025-05-07 DIAGNOSIS — C85.10 B-CELL LYMPHOMA, UNSPECIFIED B-CELL LYMPHOMA TYPE, UNSPECIFIED BODY REGION: ICD-10-CM

## 2025-05-07 LAB
ABSOLUTE EOSINOPHIL (OHS): 0.18 K/UL
ABSOLUTE MONOCYTE (OHS): 0.38 K/UL (ref 0.3–1)
ABSOLUTE NEUTROPHIL COUNT (OHS): 2.81 K/UL (ref 1.8–7.7)
ALBUMIN SERPL BCP-MCNC: 4 G/DL (ref 3.5–5.2)
ALBUMIN/CREAT UR: 37 UG/MG
ALP SERPL-CCNC: 60 UNIT/L (ref 38–126)
ALT SERPL W/O P-5'-P-CCNC: 13 UNIT/L (ref 10–44)
ANION GAP (OHS): 9 MMOL/L (ref 8–16)
AST SERPL-CCNC: 19 UNIT/L (ref 15–46)
BASOPHILS # BLD AUTO: 0.03 K/UL
BASOPHILS NFR BLD AUTO: 0.7 %
BILIRUB SERPL-MCNC: 1.1 MG/DL (ref 0.1–1)
BUN SERPL-MCNC: 19 MG/DL (ref 2–20)
CALCIUM SERPL-MCNC: 9.7 MG/DL (ref 8.7–10.5)
CHLORIDE SERPL-SCNC: 108 MMOL/L (ref 95–110)
CO2 SERPL-SCNC: 26 MMOL/L (ref 23–29)
CREAT SERPL-MCNC: 1.3 MG/DL (ref 0.5–1.4)
CREAT UR-MCNC: 46 MG/DL (ref 23–375)
ERYTHROCYTE [DISTWIDTH] IN BLOOD BY AUTOMATED COUNT: 12.8 % (ref 11.5–14.5)
GFR SERPLBLD CREATININE-BSD FMLA CKD-EPI: 58 ML/MIN/1.73/M2
GLUCOSE SERPL-MCNC: 90 MG/DL (ref 70–110)
HCT VFR BLD AUTO: 34 % (ref 40–54)
HGB BLD-MCNC: 11.3 GM/DL (ref 14–18)
IMM GRANULOCYTES # BLD AUTO: 0.01 K/UL (ref 0–0.04)
IMM GRANULOCYTES NFR BLD AUTO: 0.2 % (ref 0–0.5)
LYMPHOCYTES # BLD AUTO: 0.88 K/UL (ref 1–4.8)
MCH RBC QN AUTO: 32.8 PG (ref 27–31)
MCHC RBC AUTO-ENTMCNC: 33.2 G/DL (ref 32–36)
MCV RBC AUTO: 99 FL (ref 82–98)
MICROALBUMIN UR-MCNC: 17 UG/ML (ref ?–5000)
NUCLEATED RBC (/100WBC) (OHS): 0 /100 WBC
PLATELET # BLD AUTO: 124 K/UL (ref 150–450)
PMV BLD AUTO: 11.6 FL (ref 9.2–12.9)
POTASSIUM SERPL-SCNC: 4.6 MMOL/L (ref 3.5–5.1)
PROT SERPL-MCNC: 6.6 GM/DL (ref 6–8.4)
RBC # BLD AUTO: 3.45 M/UL (ref 4.6–6.2)
RELATIVE EOSINOPHIL (OHS): 4.2 %
RELATIVE LYMPHOCYTE (OHS): 20.5 % (ref 18–48)
RELATIVE MONOCYTE (OHS): 8.9 % (ref 4–15)
RELATIVE NEUTROPHIL (OHS): 65.5 % (ref 38–73)
SODIUM SERPL-SCNC: 143 MMOL/L (ref 136–145)
WBC # BLD AUTO: 4.29 K/UL (ref 3.9–12.7)

## 2025-05-07 PROCEDURE — 85025 COMPLETE CBC W/AUTO DIFF WBC: CPT | Mod: HCNC,PN

## 2025-05-07 PROCEDURE — 82043 UR ALBUMIN QUANTITATIVE: CPT | Mod: HCNC,PN

## 2025-05-07 PROCEDURE — 82040 ASSAY OF SERUM ALBUMIN: CPT | Mod: HCNC,PN

## 2025-05-07 PROCEDURE — 36415 COLL VENOUS BLD VENIPUNCTURE: CPT | Mod: HCNC,PN

## 2025-05-10 ENCOUNTER — RESULTS FOLLOW-UP (OUTPATIENT)
Dept: FAMILY MEDICINE | Facility: CLINIC | Age: 75
End: 2025-05-10

## 2025-05-12 ENCOUNTER — OFFICE VISIT (OUTPATIENT)
Dept: CARDIOLOGY | Facility: CLINIC | Age: 75
End: 2025-05-12
Payer: MEDICARE

## 2025-05-12 ENCOUNTER — TELEPHONE (OUTPATIENT)
Dept: CARDIOLOGY | Facility: CLINIC | Age: 75
End: 2025-05-12
Payer: MEDICARE

## 2025-05-12 ENCOUNTER — TELEPHONE (OUTPATIENT)
Dept: FAMILY MEDICINE | Facility: CLINIC | Age: 75
End: 2025-05-12
Payer: MEDICARE

## 2025-05-12 VITALS
HEART RATE: 57 BPM | BODY MASS INDEX: 23.62 KG/M2 | HEIGHT: 70 IN | DIASTOLIC BLOOD PRESSURE: 62 MMHG | WEIGHT: 165 LBS | OXYGEN SATURATION: 96 % | SYSTOLIC BLOOD PRESSURE: 163 MMHG

## 2025-05-12 DIAGNOSIS — I48.19 PERSISTENT ATRIAL FIBRILLATION: Primary | ICD-10-CM

## 2025-05-12 DIAGNOSIS — I49.3 PVC'S (PREMATURE VENTRICULAR CONTRACTIONS): ICD-10-CM

## 2025-05-12 DIAGNOSIS — Z86.79 H/O BACTERIAL ENDOCARDITIS: ICD-10-CM

## 2025-05-12 DIAGNOSIS — Z95.2 S/P MVR (MITRAL VALVE REPLACEMENT): ICD-10-CM

## 2025-05-12 DIAGNOSIS — Z95.1 S/P CABG X 1: ICD-10-CM

## 2025-05-12 DIAGNOSIS — I10 PRIMARY HYPERTENSION: Chronic | ICD-10-CM

## 2025-05-12 DIAGNOSIS — I70.0 ATHEROSCLEROSIS OF AORTA: ICD-10-CM

## 2025-05-12 DIAGNOSIS — I25.118 ATHEROSCLEROTIC HEART DISEASE OF NATIVE CORONARY ARTERY WITH OTHER FORMS OF ANGINA PECTORIS: ICD-10-CM

## 2025-05-12 PROCEDURE — 3008F BODY MASS INDEX DOCD: CPT | Mod: CPTII,S$GLB,, | Performed by: INTERNAL MEDICINE

## 2025-05-12 PROCEDURE — 3060F POS MICROALBUMINURIA REV: CPT | Mod: CPTII,S$GLB,, | Performed by: INTERNAL MEDICINE

## 2025-05-12 PROCEDURE — 99999 PR PBB SHADOW E&M-EST. PATIENT-LVL II: CPT | Mod: PBBFAC,HCNC,, | Performed by: INTERNAL MEDICINE

## 2025-05-12 PROCEDURE — 1126F AMNT PAIN NOTED NONE PRSNT: CPT | Mod: CPTII,S$GLB,, | Performed by: INTERNAL MEDICINE

## 2025-05-12 PROCEDURE — 3077F SYST BP >= 140 MM HG: CPT | Mod: CPTII,S$GLB,, | Performed by: INTERNAL MEDICINE

## 2025-05-12 PROCEDURE — 3078F DIAST BP <80 MM HG: CPT | Mod: CPTII,S$GLB,, | Performed by: INTERNAL MEDICINE

## 2025-05-12 PROCEDURE — 3066F NEPHROPATHY DOC TX: CPT | Mod: CPTII,S$GLB,, | Performed by: INTERNAL MEDICINE

## 2025-05-12 PROCEDURE — 1160F RVW MEDS BY RX/DR IN RCRD: CPT | Mod: CPTII,S$GLB,, | Performed by: INTERNAL MEDICINE

## 2025-05-12 PROCEDURE — 99215 OFFICE O/P EST HI 40 MIN: CPT | Mod: S$GLB,,, | Performed by: INTERNAL MEDICINE

## 2025-05-12 PROCEDURE — 1159F MED LIST DOCD IN RCRD: CPT | Mod: CPTII,S$GLB,, | Performed by: INTERNAL MEDICINE

## 2025-05-12 NOTE — TELEPHONE ENCOUNTER
----- Message from Nedra sent at 5/12/2025 10:13 AM CDT -----  Type:  Test ResultsWho Called: PtName of Test (Lab/Mammo/Etc): LabDate of Test: 5/5/25Ordering Provider:  Dr. ClaudioWhere the test was performed: [facility] the patient rather a call back or a response via [facility]? CallBest Call Back Number:  [phone] Information:  Pt would like to speak with nurse or provider in office regarding labs.

## 2025-05-12 NOTE — PROGRESS NOTES
Subjective:   @Patient ID:  Rodolfo Messina is a 74 y.o. male who presents for evaluation of CAD, MVR, A.fib       HPI:   05/12/2025:  F/U.  He is doing well.  Denies any cardiac complaint.  BP well-controlled at home.  He did not get the Xarelto.  Not on Multaq or Eliquis before due to tolerance and cost.    8/2024: Initial visit with me.  Accompanied by his wife.  Pleasant gentleman.  He has been doing okay.  Denies any CP, HAQ, or significant LE edema.  BP well-controlled on current meds.  He is not taking Multaq or Eliquis due to tolerance and cost.  Remains in NSR by physical exam      Historically:  Pt with hx of acute MR from bacterial endocarditis in 2018 s/p MVR also with LIMA-LAD bypass by Dr. Go complicated by post-op atrial fibrillation (only observed during hospitalization), hypertension, CKD stage 3a. Recurrent atrial fibrillation in 2024. Saw Dr. Kwong s/p DCCV and initiation of Multaq.          Prior cardiovascular  Hx  --------------------------------  DCCV 5/2024    - Heart Catheterization  5/2018    LVEDP=12mmHg.     Left dominant coronary circulation.     Seperate ostia of LAD and LCx.     60% mid LAD stenosis; iFR=0.50     - Echo 4/2024    Left Ventricle: The left ventricle is normal in size. Mildly increased wall thickness. There is normal systolic function. Ejection fraction by visual approximation is 60%. Unable to assess diastolic function due to atrial fibrillation.    Right Ventricle: Normal right ventricular cavity size. Wall thickness is normal. Right ventricle wall motion  is normal. Systolic function is normal.    Left Atrium: Left atrium is severely dilated.    Aortic Valve: There is mild to moderate aortic regurgitation with a centrally directed jet.    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 There is mild stenosis. The mean pressure gradient across the mitral valve is 7 mmHg at a heart rate of  bpm.    Tricuspid Valve: There is moderate  regurgitation.    Pulmonic Valve: There is no stenosis. There is mild regurgitation.    IVC/SVC: Intermediate venous pressure at 8 mmHg.    The estimated pulmonary artery systolic pressure is elevated at 52 mm Hg        EKG June 2024:  NSR, first-degree AV block, no acute ischemic change         Patient Active Problem List    Diagnosis Date Noted    Atherosclerosis of aorta 06/13/2024    Persistent atrial fibrillation 03/20/2024    PVC's (premature ventricular contractions) 01/10/2023    Essential tremor 01/10/2023    B-cell lymphoma 11/11/2022    Anemia 11/11/2022    Thrombocytopenia, unspecified 02/10/2020    Hearing loss of right ear 02/10/2020    BPH (benign prostatic hyperplasia) 10/15/2019    Compression fracture of C6 vertebra with routine healing 10/14/2019    H/O bacterial endocarditis 02/26/2019    Normocytic anemia 08/02/2018    Stage 3a chronic kidney disease 07/08/2018    S/P CABG x 1 05/25/2018    S/P MVR (mitral valve replacement) 05/25/2018    Atherosclerotic heart disease of native coronary artery with other forms of angina pectoris 05/22/2018    Anxiety 12/17/2015    Tremor 11/09/2014    HTN (hypertension) 11/09/2014                    LAST HbA1c  Lab Results   Component Value Date    HGBA1C 5.2 03/12/2024       Lipid panel  Lab Results   Component Value Date    CHOL 115 (L) 03/12/2024    CHOL 114 (L) 05/18/2023    CHOL 109 (L) 05/17/2022     Lab Results   Component Value Date    HDL 52 03/12/2024    HDL 47 05/18/2023    HDL 46 05/17/2022     Lab Results   Component Value Date    LDLCALC 49.4 (L) 03/12/2024    LDLCALC 53.4 (L) 05/18/2023    LDLCALC 49.0 (L) 05/17/2022     Lab Results   Component Value Date    TRIG 68 03/12/2024    TRIG 68 05/18/2023    TRIG 70 05/17/2022     Lab Results   Component Value Date    CHOLHDL 45.2 03/12/2024    CHOLHDL 41.2 05/18/2023    CHOLHDL 42.2 05/17/2022            Review of Systems   Constitutional: Negative for chills and fever.   HENT:  Negative for hearing  loss and nosebleeds.    Eyes:  Negative for blurred vision.   Cardiovascular:         As in HPI   Respiratory:  Negative for hemoptysis and shortness of breath.    Hematologic/Lymphatic: Negative for bleeding problem.   Skin:  Negative for itching.   Musculoskeletal:  Negative for falls.   Gastrointestinal:  Negative for abdominal pain and hematochezia.   Genitourinary:  Negative for hematuria.   Neurological:  Negative for dizziness and loss of balance.   Psychiatric/Behavioral:  Negative for altered mental status and depression.        Objective:   Physical Exam  Constitutional:       Appearance: He is well-developed.   HENT:      Head: Normocephalic and atraumatic.   Eyes:      Conjunctiva/sclera: Conjunctivae normal.   Neck:      Vascular: No carotid bruit or JVD.   Cardiovascular:      Rate and Rhythm: Normal rate and regular rhythm.      Pulses:           Carotid pulses are 2+ on the right side and 2+ on the left side.       Radial pulses are 2+ on the right side and 2+ on the left side.      Heart sounds: Murmur heard.      No friction rub. No gallop.   Pulmonary:      Effort: Pulmonary effort is normal. No respiratory distress.      Breath sounds: Normal breath sounds. No stridor. No wheezing.   Musculoskeletal:      Cervical back: Neck supple.      Right lower leg: Edema (trace) present.      Left lower leg: Edema (trace) present.   Skin:     General: Skin is warm and dry.   Neurological:      Mental Status: He is alert and oriented to person, place, and time.   Psychiatric:         Behavior: Behavior normal.         Assessment:     1. Atherosclerosis of aorta    2. Atherosclerotic heart disease of native coronary artery with other forms of angina pectoris        Plan:   1. Status post mitral valve replacement.  Recent echo with normal bioprosthetic function.  Clinically is euvolemic with NYHA FC II.  Continue current medical therapy and repeat echocardiogram      2. Paroxysmal AFib   He remains in NSR by  physical exam.  Not taking Multaq and Eliquis.  We have discussed CVA risk given his elevated chads Vasc score and mitral valve bioprosthesis.  After shared decision he agreed to try another medicine other than Eliquis.  We discussed Coumadin which he is not willing to consider.  We will try Xarelto to see if we will be approved by the insurance.      3. Coronary artery disease  Stable cardiac symptoms  Post LIMA LAD  Continue aspirin and statin    4. Hypertension  BP well-controlled at home  Goal BP less than 130/80      5. Hyperlipidemia    LDL at goal.  Continue current statin.      Low-salt diet    Mediterranean diet      -In today's visit, at least 4 established conditions that pose a risk to life or bodily function have been addressed and the conditions are severe.    -In today's visit, monitoring for drug toxicity was accomplished.     Pertinent cardiac images and EKG reviewed independently.    Continue with current medical plan and lifestyle changes.  Return sooner for concerns or questions. If symptoms persist go to the ED  I have reviewed all pertinent data including patient's medical history in detail and updated the computerized patient record.     No orders of the defined types were placed in this encounter.      Follow up as scheduled.     He expressed verbal understanding and agreed with the plan    Patient's Medications   New Prescriptions    No medications on file   Previous Medications    ACETAMINOPHEN (TYLENOL) 650 MG TBSR    Take 650 mg by mouth every 8 (eight) hours.    AMLODIPINE (NORVASC) 10 MG TABLET    Take 1 tablet (10 mg total) by mouth once daily.    ASCORBIC ACID, VITAMIN C, (VITAMIN C) 1000 MG TABLET    Take 1,000 mg by mouth every other day.    ASPIRIN (ECOTRIN) 81 MG EC TABLET    Take 81 mg by mouth 2 (two) times a day.    ATORVASTATIN (LIPITOR) 40 MG TABLET    GIVE 1 TABLET VIA G TUBE DAILY    CETIRIZINE (ZYRTEC) 10 MG TABLET    Take 10 mg by mouth once daily.    EPINEPHRINE (EPIPEN)  0.3 MG/0.3 ML ATIN    Inject 0.3 mLs (0.3 mg total) into the muscle once. for 1 dose    FLUTICASONE PROPIONATE (FLONASE) 50 MCG/ACTUATION NASAL SPRAY    2 sprays (100 mcg total) by Each Nostril route once daily.    GABAPENTIN (NEURONTIN) 100 MG CAPSULE    Take 100 mg by mouth Daily.    KETOCONAZOLE (NIZORAL) 2 % SHAMPOO    Apply topically twice a week.    LACTOSE-REDUCED FOOD (ENSURE HIGH PROTEIN-MUSCLE ORAL)    Take by mouth once daily.     LORATADINE ORAL    Take 1 tablet by mouth Daily.    MAGNESIUM 250 MG TAB    Take by mouth once daily.    MECLIZINE (ANTIVERT) 25 MG TABLET    Take 1 tablet (25 mg total) by mouth 3 (three) times daily as needed for Dizziness or Nausea.    MELATONIN 10 MG TAB    Take by mouth as needed.     METHOCARBAMOL (ROBAXIN) 500 MG TAB    Take 500 mg by mouth Daily.    MIRTAZAPINE (REMERON) 30 MG TABLET    TAKE 1 TABLET EVERY EVENING    MULTIVIT-MIN/FA/LYCOPEN/LUTEIN (ADULTS 50 PLUS ORAL)    Take by mouth once daily.     SAW PALMETTO ORAL    Take 450 mg by mouth once daily.    TAMSULOSIN (FLOMAX) 0.4 MG CAP    Take 2 capsules (0.8 mg total) by mouth once daily.   Modified Medications    No medications on file   Discontinued Medications    No medications on file

## 2025-05-12 NOTE — TELEPHONE ENCOUNTER
Spoke with patient. Advise patient to contact pcp office Dr. Claudio for lab results due to the ordering provider is pcp.   Patient verbalized understanding.     Magdalene ALVARADO     ----- Message from Nedra sent at 5/12/2025 10:13 AM CDT -----  Type:  Test ResultsWho Called: PtName of Test (Lab/Mammo/Etc): LabDate of Test: 5/5/25Ordering Provider:  Dr. ClaudioWhere the test was performed[facility] or a response via [facility]? CallBe Call Back Number:  [phone] Information:  Pt would like to speak with nurse or provider in office regarding labs.

## 2025-05-16 NOTE — TELEPHONE ENCOUNTER
Deric Claudio MD  5/11/2025 10:36 PM CDT Back to Top      Your labs are all stable   NO changes need to be made       LVM for pt to call back clinic

## 2025-05-19 ENCOUNTER — TELEPHONE (OUTPATIENT)
Dept: FAMILY MEDICINE | Facility: CLINIC | Age: 75
End: 2025-05-19
Payer: MEDICARE

## 2025-05-19 NOTE — TELEPHONE ENCOUNTER
----- Message from Nancie sent at 5/19/2025  9:58 AM CDT -----  Regarding: Call Back  Contact: [phone]  TEST RESULTS: Patient would like to get test results.Name of test (lab, mammo, etc.): Labs Date of test: 5/7/25

## 2025-05-20 ENCOUNTER — HOSPITAL ENCOUNTER (OUTPATIENT)
Dept: CARDIOLOGY | Facility: HOSPITAL | Age: 75
Discharge: HOME | End: 2025-05-20
Attending: INTERNAL MEDICINE
Payer: MEDICARE

## 2025-05-20 VITALS — BODY MASS INDEX: 23.62 KG/M2 | HEIGHT: 70 IN | WEIGHT: 165 LBS

## 2025-05-20 DIAGNOSIS — I49.3 PVC'S (PREMATURE VENTRICULAR CONTRACTIONS): ICD-10-CM

## 2025-05-20 DIAGNOSIS — I10 PRIMARY HYPERTENSION: Chronic | ICD-10-CM

## 2025-05-20 DIAGNOSIS — Z95.1 S/P CABG X 1: ICD-10-CM

## 2025-05-20 DIAGNOSIS — I48.19 PERSISTENT ATRIAL FIBRILLATION: ICD-10-CM

## 2025-05-20 DIAGNOSIS — Z95.2 S/P MVR (MITRAL VALVE REPLACEMENT): ICD-10-CM

## 2025-05-20 DIAGNOSIS — I70.0 ATHEROSCLEROSIS OF AORTA: ICD-10-CM

## 2025-05-20 DIAGNOSIS — I25.118 ATHEROSCLEROTIC HEART DISEASE OF NATIVE CORONARY ARTERY WITH OTHER FORMS OF ANGINA PECTORIS: ICD-10-CM

## 2025-05-20 LAB
AORTIC SIZE INDEX (SOV): 1.5 CM/M2
AORTIC SIZE INDEX: 1.4 CM/M2
APICAL FOUR CHAMBER EJECTION FRACTION: 64 %
APICAL TWO CHAMBER EJECTION FRACTION: 39 %
ASCENDING AORTA: 2.6 CM
AV INDEX (PROSTH): 0.5
AV MEAN GRADIENT: 5 MMHG
AV PEAK GRADIENT: 10 MMHG
AV REGURGITATION PRESSURE HALF TIME: 337 MS
AV VALVE AREA BY VELOCITY RATIO: 2 CM²
AV VALVE AREA: 1.6 CM²
AV VELOCITY RATIO: 0.63
BSA FOR ECHO PROCEDURE: 1.92 M2
CV ECHO LV RWT: 0.22 CM
DOP CALC AO PEAK VEL: 1.6 M/S
DOP CALC AO VTI: 35.3 CM
DOP CALC LVOT AREA: 3.1 CM2
DOP CALC LVOT DIAMETER: 2 CM
DOP CALC LVOT PEAK VEL: 1 M/S
DOP CALC LVOT STROKE VOLUME: 55.9 CM3
DOP CALC MV VTI: 44.1 CM
DOP CALCLVOT PEAK VEL VTI: 17.8 CM
ECHO LV POSTERIOR WALL: 0.7 CM (ref 0.6–1.1)
FRACTIONAL SHORTENING: 23.1 % (ref 28–44)
INTERVENTRICULAR SEPTUM: 0.9 CM (ref 0.6–1.1)
IVC DIAMETER: 2.18 CM
IVRT: 69 MSEC
LEFT ATRIUM AREA SYSTOLIC (APICAL 2 CHAMBER): 27.98 CM2
LEFT ATRIUM AREA SYSTOLIC (APICAL 4 CHAMBER): 28.05 CM2
LEFT ATRIUM VOLUME INDEX MOD: 54 ML/M2
LEFT ATRIUM VOLUME MOD: 104 ML
LEFT INTERNAL DIMENSION IN SYSTOLE: 5 CM (ref 2.1–4)
LEFT VENTRICLE DIASTOLIC VOLUME INDEX: 111.98 ML/M2
LEFT VENTRICLE DIASTOLIC VOLUME: 215 ML
LEFT VENTRICLE END DIASTOLIC VOLUME APICAL 2 CHAMBER: 126.64 ML
LEFT VENTRICLE END DIASTOLIC VOLUME APICAL 4 CHAMBER: 155.6 ML
LEFT VENTRICLE END SYSTOLIC VOLUME APICAL 2 CHAMBER: 106.57 ML
LEFT VENTRICLE END SYSTOLIC VOLUME APICAL 4 CHAMBER: 100.83 ML
LEFT VENTRICLE MASS INDEX: 111.6 G/M2
LEFT VENTRICLE SYSTOLIC VOLUME INDEX: 62.5 ML/M2
LEFT VENTRICLE SYSTOLIC VOLUME: 120 ML
LEFT VENTRICULAR INTERNAL DIMENSION IN DIASTOLE: 6.5 CM (ref 3.5–6)
LEFT VENTRICULAR MASS: 214.3 G
LVED V (TEICH): 215.13 ML
LVES V (TEICH): 119.54 ML
LVOT MG: 1.84 MMHG
LVOT MV: 0.63 CM/S
MV MEAN GRADIENT: 12 MMHG
MV PEAK GRADIENT: 19 MMHG
MV VALVE AREA BY CONTINUITY EQUATION: 1.27 CM2
OHS CV RV/LV RATIO: 0.74 CM
PISA AR MAX VEL: 4.14 M/S
PISA TR MAX VEL: 4 M/S
PV PEAK GRADIENT: 4 MMHG
PV PEAK VELOCITY: 0.96 M/S
RA PRESSURE ESTIMATED: 3 MMHG
RA VOL SYS: 60.32 ML
RIGHT ATRIAL AREA: 19.8 CM2
RIGHT ATRIUM END SYSTOLIC VOLUME APICAL 4 CHAMBER INDEX BSA: 29.05 ML/M2
RIGHT ATRIUM VOLUME AREA LENGTH APICAL 4 CHAMBER: 55.78 ML
RIGHT VENTRICLE DIASTOLIC BASEL DIMENSION: 4.8 CM
RV TB RVSP: 7 MMHG
RV TISSUE DOPPLER FREE WALL SYSTOLIC VELOCITY 1 (APICAL 4 CHAMBER VIEW): 9.99 CM/S
SINUS: 2.96 CM
STJ: 2.7 CM
TDI LATERAL: 0.06 M/S
TDI SEPTAL: 0.05 M/S
TDI: 0.06 M/S
TR MAX PG: 63 MMHG
TRICUSPID ANNULAR PLANE SYSTOLIC EXCURSION: 1.7 CM
TV REST PULMONARY ARTERY PRESSURE: 67 MMHG
Z-SCORE OF LEFT VENTRICULAR DIMENSION IN END DIASTOLE: 1.82
Z-SCORE OF LEFT VENTRICULAR DIMENSION IN END SYSTOLE: 3.2

## 2025-05-20 PROCEDURE — 93306 TTE W/DOPPLER COMPLETE: CPT | Mod: HCNC,PN

## 2025-05-20 PROCEDURE — 93306 TTE W/DOPPLER COMPLETE: CPT | Mod: 26,HCNC,, | Performed by: INTERNAL MEDICINE

## 2025-05-26 NOTE — TREATMENT PLAN
Video capsule endoscopy done today. Reviewed images show no signs of major bleeding or source of bleeding. Full report to follow tomorrow.    Unclear etiology of GI bleeding at this point.  If patient is planned for anti-coagulation, this can be started. If he shows overt signs of bleeding, contact GI for evaluation. No further recommendations.   You can access the FollowMyHealth Patient Portal offered by [facility] by registering at the following website: [email or web address]. By joining [email or web address]’s FollowMyHealth portal, you will also be able to view your health information using other applications (apps) compatible with our system.

## 2025-05-27 DIAGNOSIS — Z95.2 S/P MVR (MITRAL VALVE REPLACEMENT): ICD-10-CM

## 2025-05-27 DIAGNOSIS — Z95.1 S/P CABG X 1: Primary | ICD-10-CM

## 2025-05-27 DIAGNOSIS — I50.20 HEART FAILURE WITH MILDLY REDUCED EJECTION FRACTION (HFMREF, 41-49%): ICD-10-CM

## 2025-05-28 ENCOUNTER — OFFICE VISIT (OUTPATIENT)
Dept: FAMILY MEDICINE | Facility: CLINIC | Age: 75
End: 2025-05-28
Payer: MEDICARE

## 2025-05-28 VITALS
HEIGHT: 70 IN | TEMPERATURE: 98 F | SYSTOLIC BLOOD PRESSURE: 110 MMHG | DIASTOLIC BLOOD PRESSURE: 62 MMHG | HEART RATE: 73 BPM | WEIGHT: 167.31 LBS | OXYGEN SATURATION: 98 % | BODY MASS INDEX: 23.95 KG/M2

## 2025-05-28 DIAGNOSIS — Z91.013 ALLERGY TO SEAFOOD: ICD-10-CM

## 2025-05-28 DIAGNOSIS — R68.84 MAXILLARY PAIN: ICD-10-CM

## 2025-05-28 DIAGNOSIS — R51.9 FACE PAIN: Primary | ICD-10-CM

## 2025-05-28 DIAGNOSIS — N64.4 BREAST PAIN, LEFT: ICD-10-CM

## 2025-05-28 PROCEDURE — 1101F PT FALLS ASSESS-DOCD LE1/YR: CPT | Mod: CPTII,S$GLB,, | Performed by: STUDENT IN AN ORGANIZED HEALTH CARE EDUCATION/TRAINING PROGRAM

## 2025-05-28 PROCEDURE — 99214 OFFICE O/P EST MOD 30 MIN: CPT | Mod: S$GLB,,, | Performed by: STUDENT IN AN ORGANIZED HEALTH CARE EDUCATION/TRAINING PROGRAM

## 2025-05-28 PROCEDURE — 3060F POS MICROALBUMINURIA REV: CPT | Mod: CPTII,S$GLB,, | Performed by: STUDENT IN AN ORGANIZED HEALTH CARE EDUCATION/TRAINING PROGRAM

## 2025-05-28 PROCEDURE — 1126F AMNT PAIN NOTED NONE PRSNT: CPT | Mod: CPTII,S$GLB,, | Performed by: STUDENT IN AN ORGANIZED HEALTH CARE EDUCATION/TRAINING PROGRAM

## 2025-05-28 PROCEDURE — 3074F SYST BP LT 130 MM HG: CPT | Mod: CPTII,S$GLB,, | Performed by: STUDENT IN AN ORGANIZED HEALTH CARE EDUCATION/TRAINING PROGRAM

## 2025-05-28 PROCEDURE — 3288F FALL RISK ASSESSMENT DOCD: CPT | Mod: CPTII,S$GLB,, | Performed by: STUDENT IN AN ORGANIZED HEALTH CARE EDUCATION/TRAINING PROGRAM

## 2025-05-28 PROCEDURE — 3066F NEPHROPATHY DOC TX: CPT | Mod: CPTII,S$GLB,, | Performed by: STUDENT IN AN ORGANIZED HEALTH CARE EDUCATION/TRAINING PROGRAM

## 2025-05-28 PROCEDURE — 3008F BODY MASS INDEX DOCD: CPT | Mod: CPTII,S$GLB,, | Performed by: STUDENT IN AN ORGANIZED HEALTH CARE EDUCATION/TRAINING PROGRAM

## 2025-05-28 PROCEDURE — 1159F MED LIST DOCD IN RCRD: CPT | Mod: CPTII,S$GLB,, | Performed by: STUDENT IN AN ORGANIZED HEALTH CARE EDUCATION/TRAINING PROGRAM

## 2025-05-28 PROCEDURE — 1160F RVW MEDS BY RX/DR IN RCRD: CPT | Mod: CPTII,S$GLB,, | Performed by: STUDENT IN AN ORGANIZED HEALTH CARE EDUCATION/TRAINING PROGRAM

## 2025-05-28 PROCEDURE — 3078F DIAST BP <80 MM HG: CPT | Mod: CPTII,S$GLB,, | Performed by: STUDENT IN AN ORGANIZED HEALTH CARE EDUCATION/TRAINING PROGRAM

## 2025-05-28 NOTE — PROGRESS NOTES
" Patient ID: Rodolfo Messina is a 74 y.o. male.     Chief Complaint: Mouth Lesions and Breast Pain    HPI  History of Present Illness    Rodolfo presents today for facial pain and sensitivity in the upper jaw area.    He reports facial pain and sensitivity located on right maxilla near crown area. Pain is present only on external surface and is elicited by touch. His dentist evaluated the area and found no signs of infection. He recently underwent wisdom tooth removal with facial scan. No abnormalities were noted since that time.     He reports chest discomfort described as a burning sensation, localized to the chest area and noticeable when buttoning shirts.    He was recently diagnosed with vertigo and underwent MRI of the head. He completed a sleep study approximately three weeks ago with use of a strap and airflow monitoring device over two nights.    He has an allergy to shellfish.         Review of Systems  Review of Systems   Constitutional:  Negative for fever.   HENT:  Negative for ear pain and sinus pain.    Eyes:  Negative for discharge.   Respiratory:  Negative for cough and shortness of breath.    Cardiovascular:  Negative for chest pain and leg swelling.   Gastrointestinal:  Negative for diarrhea, nausea and vomiting.   Genitourinary:  Negative for urgency.   Musculoskeletal:  Negative for myalgias.   Skin:  Negative for rash.   Neurological:  Negative for weakness and headaches.   Psychiatric/Behavioral:  Negative for depression.    All other systems reviewed and are negative.      Currently Medications  Medications Ordered Prior to Encounter[1]    Physical  Exam  Vitals:    05/28/25 1105   BP: 110/62   BP Location: Right arm   Patient Position: Sitting   Pulse: 73   Temp: 98.1 °F (36.7 °C)   SpO2: 98%   Weight: 75.9 kg (167 lb 5.3 oz)   Height: 5' 10" (1.778 m)      Body mass index is 24.01 kg/m².  Wt Readings from Last 3 Encounters:   05/28/25 75.9 kg (167 lb 5.3 oz)   05/20/25 74.8 kg (165 lb) "   05/12/25 74.8 kg (165 lb)       Physical Exam  Vitals and nursing note reviewed.   Constitutional:       General: He is not in acute distress.     Appearance: He is not ill-appearing.   HENT:      Head: Normocephalic and atraumatic.      Right Ear: External ear normal.      Left Ear: External ear normal.      Nose: Nose normal.      Mouth/Throat:      Mouth: Mucous membranes are moist.   Eyes:      Extraocular Movements: Extraocular movements intact.      Conjunctiva/sclera: Conjunctivae normal.   Cardiovascular:      Rate and Rhythm: Normal rate and regular rhythm.      Pulses: Normal pulses.      Heart sounds: No murmur heard.  Pulmonary:      Effort: Pulmonary effort is normal. No respiratory distress.      Breath sounds: No wheezing.   Abdominal:      General: There is no distension.      Palpations: Abdomen is soft. There is no mass.      Tenderness: There is no abdominal tenderness.   Musculoskeletal:         General: No swelling.      Cervical back: Normal range of motion.   Skin:     Coloration: Skin is not jaundiced.      Findings: No rash.   Neurological:      General: No focal deficit present.      Mental Status: He is alert and oriented to person, place, and time.   Psychiatric:         Mood and Affect: Mood normal.         Thought Content: Thought content normal.         Labs:    Complete Blood Count  Lab Results   Component Value Date    RBC 3.45 (L) 05/07/2025    HGB 11.3 (L) 05/07/2025    HCT 34.0 (L) 05/07/2025    MCV 99 (H) 05/07/2025    MCH 32.8 (H) 05/07/2025    MCHC 33.2 05/07/2025    RDW 12.8 05/07/2025     (L) 05/07/2025    MPV 11.6 05/07/2025    GRAN 2.6 08/21/2024    GRAN 58.5 08/21/2024    GRAN 2.6 08/21/2024    GRAN 58.5 08/21/2024    LYMPH 20.5 05/07/2025    LYMPH 0.88 (L) 05/07/2025    MONO 8.9 05/07/2025    MONO 0.38 05/07/2025    EOS 4.2 05/07/2025    EOS 0.18 05/07/2025    BASO 0.03 08/21/2024    BASO 0.03 08/21/2024    EOSINOPHIL 5.0 08/21/2024    EOSINOPHIL 5.0 08/21/2024  "   BASOPHIL 0.7 05/07/2025    BASOPHIL 0.03 05/07/2025    DIFFMETHOD Automated 08/21/2024    DIFFMETHOD Automated 08/21/2024       Comprehensive Metabolic Panel  Lab Results   Component Value Date    GLU 90 05/07/2025    BUN 19 05/07/2025    CREATININE 1.3 05/07/2025     05/07/2025    K 4.6 05/07/2025     05/07/2025    PROT 6.6 05/07/2025    ALBUMIN 4.0 05/07/2025    BILITOT 1.1 (H) 05/07/2025    AST 19 05/07/2025    ALKPHOS 60 05/07/2025    CO2 26 05/07/2025    ALT 13 05/07/2025    ANIONGAP 9 05/07/2025       TSH  No results found for: "TSH"    Imaging:  Echo    Left Ventricle: The left ventricle is moderately dilated. Normal wall   thickness. Mild global hypokinesis present. There is mildly reduced   systolic function with a visually estimated ejection fraction of 40 - 45%.   Diastolic function cannot be reliably determined in the presence of mitral   valve ring/replacement. Elevated left ventricular filling pressure.    Right Ventricle: The right ventricle is moderately dilated Systolic   function is borderline low.    Left Atrium: The left atrium is moderately dilated    Right Atrium: The right atrium is mildly dilated .    Aortic Valve: The aortic valve is a trileaflet valve. Mildly calcified   cusps. There is no stenosis. Aortic valve peak velocity is 1.6 m/s. Mean   gradient is 5 mmHg. There is moderate aortic regurgitation.    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   There is moderate stenosis. The mean pressure gradient across the mitral   valve is 12 mmHg at a heart rate of 72 bpm.    Tricuspid Valve: There is moderate regurgitation.    Pulmonic Valve: There is mild regurgitation.    Aorta: The aortic root is normal in size measuring 2.96 cm.    Pulmonary Artery: The estimated pulmonary artery systolic pressure is   67 mmHg.    IVC/SVC: Normal venous pressure at 3 mmHg.    Pericardium: There is no pericardial effusion.      Assessment/Plan:  Assessment & Plan  "   Considered and ordered CT Facial Bones W Contrast dye to investigate persistent soreness and sensitivity in area near dental crown.  Reviewed previous MRI results, which did not show significant findings.      CHEST PAIN:  - Rodolfo reports chest pain that feels like a burn and is sensitive to touch.  - Pain is less severe than previously but still present.  - Discussed the nature and characteristics of the pain  - us ordered.         1. Face pain  -     CT Maxillofacial W Wo Contrast; Future; Expected date: 05/28/2025  -     Creatinine, serum; Future; Expected date: 05/28/2025    2. Maxillary pain  -     CT Maxillofacial W Wo Contrast; Future; Expected date: 05/28/2025  -     Creatinine, serum; Future; Expected date: 05/28/2025    3. Breast pain, left  -     US Breast Left Limited; Future; Expected date: 05/28/2025    4. Allergy to seafood  -     Discontinue: EPINEPHrine 2 mg/spray (0.1 mL) Spry; Use as directed for allergic reaction.  Dispense: 2 each; Refill: 3  -     EPINEPHrine 2 mg/spray (0.1 mL) Spry; Use as directed for allergic reaction.  Dispense: 2 each; Refill: 3         Discussed how to stay healthy including: diet, exercise, refraining from smoking and discussed screening exams / tests needed for age, sex and family Hx.      Reji Rosas MD    This note was generated with the assistance of ambient listening technology. Verbal consent was obtained by the patient and accompanying visitor(s) for the recording of patient appointment to facilitate this note. I attest to having reviewed and edited the generated note for accuracy, though some syntax or spelling errors may persist. Please contact the author of this note for any clarification.         [1]   Current Outpatient Medications on File Prior to Visit   Medication Sig Dispense Refill    acetaminophen (TYLENOL) 650 MG TbSR Take 650 mg by mouth every 8 (eight) hours.      amLODIPine (NORVASC) 10 MG tablet Take 1 tablet (10 mg total) by mouth once daily.  90 tablet 3    ascorbic acid, vitamin C, (VITAMIN C) 1000 MG tablet Take 1,000 mg by mouth every other day.      aspirin (ECOTRIN) 81 MG EC tablet Take 81 mg by mouth 2 (two) times a day.      atorvastatin (LIPITOR) 40 MG tablet GIVE 1 TABLET VIA G TUBE DAILY 90 tablet 2    cetirizine (ZYRTEC) 10 MG tablet Take 10 mg by mouth once daily.      fluticasone propionate (FLONASE) 50 mcg/actuation nasal spray 2 sprays (100 mcg total) by Each Nostril route once daily. 18.2 mL 11    gabapentin (NEURONTIN) 100 MG capsule Take 100 mg by mouth Daily.      ketoconazole (NIZORAL) 2 % shampoo Apply topically twice a week.      lactose-reduced food (ENSURE HIGH PROTEIN-MUSCLE ORAL) Take by mouth once daily.       LORATADINE ORAL Take 1 tablet by mouth Daily.      magnesium 250 mg Tab Take by mouth once daily.      meclizine (ANTIVERT) 25 mg tablet Take 1 tablet (25 mg total) by mouth 3 (three) times daily as needed for Dizziness or Nausea. 20 tablet 0    melatonin 10 mg Tab Take by mouth as needed.       methocarbamoL (ROBAXIN) 500 MG Tab Take 500 mg by mouth Daily.      mirtazapine (REMERON) 30 MG tablet TAKE 1 TABLET EVERY EVENING 90 tablet 3    multivit-min/FA/lycopen/lutein (ADULTS 50 PLUS ORAL) Take by mouth once daily.       rivaroxaban (XARELTO) 15 mg Tab Take 1 tablet (15 mg total) by mouth once daily. 30 tablet 11    SAW PALMETTO ORAL Take 450 mg by mouth once daily.      tamsulosin (FLOMAX) 0.4 mg Cap Take 2 capsules (0.8 mg total) by mouth once daily. 180 capsule 0     No current facility-administered medications on file prior to visit.

## 2025-05-29 ENCOUNTER — RESULTS FOLLOW-UP (OUTPATIENT)
Dept: CARDIOLOGY | Facility: CLINIC | Age: 75
End: 2025-05-29

## 2025-05-29 ENCOUNTER — TELEPHONE (OUTPATIENT)
Dept: CARDIOLOGY | Facility: CLINIC | Age: 75
End: 2025-05-29
Payer: MEDICARE

## 2025-05-29 NOTE — PROGRESS NOTES
Please call.  Let Mr. Reynolds knows that I have reviewed his echocardiogram.  The heart pumping function slightly weaker than it was.  I would like to get PET stress test to rule out any significant blockages.  He needs follow up after the PET stress test to discuss medication adjustment.  Thanks

## 2025-05-29 NOTE — TELEPHONE ENCOUNTER
I reached out to patient to give him Test results of the Echo and also scheduld PET per sef.  I left him V/Message to call the clinic.    Thank you,    Nw

## 2025-05-30 ENCOUNTER — TELEPHONE (OUTPATIENT)
Dept: CARDIOLOGY | Facility: CLINIC | Age: 75
End: 2025-05-30
Payer: MEDICARE

## 2025-05-30 NOTE — TELEPHONE ENCOUNTER
Spoke and   Provided patient with Echo Test .  Per Dr. Jerez   and we scheduled PET Stress Test for 6-3-2025  Patient verbalized understand, no further questions or concerns.    Nw

## 2025-05-30 NOTE — TELEPHONE ENCOUNTER
----- Message from Pradeep Jerez MD sent at 5/29/2025  9:41 AM CDT -----  Please call.  Let Mr. Reynolds knows that I have reviewed his echocardiogram.  The heart pumping function slightly weaker than it was.  I would like to get PET stress test to rule out any significant   blockages.  He needs follow up after the PET stress test to discuss medication adjustment.  Thanks  ----- Message -----  From: Urban Estrada MD  Sent: 5/20/2025   5:13 PM CDT  To: Pradeep Jerez MD

## 2025-06-13 ENCOUNTER — HOSPITAL ENCOUNTER (OUTPATIENT)
Dept: RADIOLOGY | Facility: HOSPITAL | Age: 75
Discharge: HOME | End: 2025-06-13
Attending: STUDENT IN AN ORGANIZED HEALTH CARE EDUCATION/TRAINING PROGRAM
Payer: MEDICARE

## 2025-06-13 DIAGNOSIS — R51.9 FACE PAIN: ICD-10-CM

## 2025-06-13 DIAGNOSIS — R68.84 MAXILLARY PAIN: ICD-10-CM

## 2025-06-13 PROCEDURE — 70488 CT MAXILLOFACIAL W/O & W/DYE: CPT | Mod: 26,HCNC,, | Performed by: RADIOLOGY

## 2025-06-13 PROCEDURE — 70488 CT MAXILLOFACIAL W/O & W/DYE: CPT | Mod: TC,HCNC,PN

## 2025-06-13 PROCEDURE — 25500020 PHARM REV CODE 255: Mod: HCNC,PN | Performed by: STUDENT IN AN ORGANIZED HEALTH CARE EDUCATION/TRAINING PROGRAM

## 2025-06-13 RX ADMIN — IOHEXOL 75 ML: 350 INJECTION, SOLUTION INTRAVENOUS at 09:06

## 2025-06-14 ENCOUNTER — RESULTS FOLLOW-UP (OUTPATIENT)
Dept: FAMILY MEDICINE | Facility: CLINIC | Age: 75
End: 2025-06-14

## 2025-06-14 RX ORDER — SULFAMETHOXAZOLE AND TRIMETHOPRIM 800; 160 MG/1; MG/1
1 TABLET ORAL 2 TIMES DAILY
Qty: 14 TABLET | Refills: 0 | Status: SHIPPED | OUTPATIENT
Start: 2025-06-14 | End: 2025-06-21

## 2025-06-17 ENCOUNTER — HOSPITAL ENCOUNTER (OUTPATIENT)
Dept: CARDIOLOGY | Facility: HOSPITAL | Age: 75
Discharge: HOME | End: 2025-06-17
Attending: INTERNAL MEDICINE
Payer: MEDICARE

## 2025-06-17 ENCOUNTER — TELEPHONE (OUTPATIENT)
Dept: CARDIOLOGY | Facility: CLINIC | Age: 75
End: 2025-06-17
Payer: MEDICARE

## 2025-06-17 ENCOUNTER — RESULTS FOLLOW-UP (OUTPATIENT)
Dept: CARDIOLOGY | Facility: CLINIC | Age: 75
End: 2025-06-17

## 2025-06-17 VITALS
RESPIRATION RATE: 16 BRPM | WEIGHT: 167 LBS | BODY MASS INDEX: 23.91 KG/M2 | HEIGHT: 70 IN | SYSTOLIC BLOOD PRESSURE: 158 MMHG | DIASTOLIC BLOOD PRESSURE: 56 MMHG | HEART RATE: 80 BPM

## 2025-06-17 DIAGNOSIS — Z95.1 S/P CABG X 1: ICD-10-CM

## 2025-06-17 DIAGNOSIS — Z95.2 S/P MVR (MITRAL VALVE REPLACEMENT): ICD-10-CM

## 2025-06-17 DIAGNOSIS — I50.20 HEART FAILURE WITH MILDLY REDUCED EJECTION FRACTION (HFMREF, 41-49%): ICD-10-CM

## 2025-06-17 LAB
CFR FLOW - ANTERIOR: 1.51
CFR FLOW - INFERIOR: 1.61
CFR FLOW - LATERAL: 1.38
CFR FLOW - MAX: 1.93
CFR FLOW - MIN: 0.93
CFR FLOW - SEPTAL: 1.51
CFR FLOW - WHOLE HEART: 1.5
CV STRESS BASE HR: 71 BPM
DIASTOLIC BLOOD PRESSURE: 78 MMHG
EJECTION FRACTION- HIGH: 65 %
END DIASTOLIC INDEX-HIGH: 153 ML/M2
END DIASTOLIC INDEX-LOW: 93 ML/M2
END SYSTOLIC INDEX-HIGH: 71 ML/M2
END SYSTOLIC INDEX-LOW: 31 ML/M2
NUC REST DIASTOLIC VOLUME INDEX: 192
NUC REST EJECTION FRACTION: 51
NUC REST SYSTOLIC VOLUME INDEX: 94
NUC STRESS DIASTOLIC VOLUME INDEX: 186
NUC STRESS EJECTION FRACTION: 56 %
NUC STRESS SYSTOLIC VOLUME INDEX: 81
OHS CV CPX 1 MINUTE RECOVERY HEART RATE: 88 BPM
OHS CV CPX 85 PERCENT MAX PREDICTED HEART RATE MALE: 123
OHS CV CPX MAX PREDICTED HEART RATE: 145
OHS CV CPX PATIENT IS FEMALE: 0
OHS CV CPX PATIENT IS MALE: 1
OHS CV CPX PEAK DIASTOLIC BLOOD PRESSURE: 49 MMHG
OHS CV CPX PEAK HEAR RATE: 90 BPM
OHS CV CPX PEAK RATE PRESSURE PRODUCT: NORMAL
OHS CV CPX PEAK SYSTOLIC BLOOD PRESSURE: 145 MMHG
OHS CV CPX PERCENT MAX PREDICTED HEART RATE ACHIEVED: 62
OHS CV CPX RATE PRESSURE PRODUCT PRESENTING: NORMAL
OHS CV INITIAL DOSE: 23.1 MCG/KG/MIN
OHS CV MODERATELY REDUCED FLOW CAPACITY: 14 %
OHS CV MYOCARDIAL STEAL: 0 %
OHS CV NO ISCHEMIA MILDLY REDUCED FLOW CAPACTY: 82 %
OHS CV NO ISCHEMIA MINIMALLY REDUCED FLOW CAPACITY: 2 %
OHS CV NON-TRANSMURAL MYOCARDIAL INFARCTION SINGLE CONTINUOUS REGION: 0 %
OHS CV NORMAL FLOW CAPACITY COMPARABLE TO HEALTHY YOUNG VOLUNTEERS: 0 %
OHS CV PEAK DOSE: 22.8 MCG/KG/MIN
OHS CV PET ID: 9115
OHS CV PRE-DOMINANTLY MYOCARDIAL SCAR: 0 %
OHS CV SEVERELY REDUCED FLOW CAPACITY LARGEST SINGLE CONTINUOUS REGION: 0 %
OHS CV SEVERELY REDUCED FLOW CAPACITY: 2 %
OHS CV TOTAL EXAM DLP: 234.14 MGY-CM
REST FLOW - ANTERIOR: 0.98 CC/MIN/G
REST FLOW - INFERIOR: 0.92 CC/MIN/G
REST FLOW - LATERAL: 0.97 CC/MIN/G
REST FLOW - MAX: 1.32 CC/MIN/G
REST FLOW - MIN: 0.35 CC/MIN/G
REST FLOW - SEPTAL: 0.81 CC/MIN/G
REST FLOW - WHOLE HEART: 0.92 CC/MIN/G
RETIRED EF AND QEF - SEE NOTES: 53 %
STRESS FLOW - ANTERIOR: 1.47 CC/MIN/G
STRESS FLOW - INFERIOR: 1.48 CC/MIN/G
STRESS FLOW - LATERAL: 1.33 CC/MIN/G
STRESS FLOW - MAX: 1.94 CC/MIN/G
STRESS FLOW - MIN: 0.33 CC/MIN/G
STRESS FLOW - SEPTAL: 1.25 CC/MIN/G
STRESS FLOW - WHOLE HEART: 1.38 CC/MIN/G
SYSTOLIC BLOOD PRESSURE: 156 MMHG

## 2025-06-17 PROCEDURE — 78431 MYOCRD IMG PET RST&STRS CT: CPT | Mod: HCNC

## 2025-06-17 PROCEDURE — 93018 CV STRESS TEST I&R ONLY: CPT | Mod: HCNC,,, | Performed by: INTERNAL MEDICINE

## 2025-06-17 PROCEDURE — 78431 MYOCRD IMG PET RST&STRS CT: CPT | Mod: 26,HCNC,, | Performed by: INTERNAL MEDICINE

## 2025-06-17 PROCEDURE — 63600175 PHARM REV CODE 636 W HCPCS: Mod: HCNC | Performed by: INTERNAL MEDICINE

## 2025-06-17 PROCEDURE — 78434 AQMBF PET REST & RX STRESS: CPT | Mod: 26,HCNC,, | Performed by: INTERNAL MEDICINE

## 2025-06-17 PROCEDURE — A9555 RB82 RUBIDIUM: HCPCS | Mod: HCNC | Performed by: INTERNAL MEDICINE

## 2025-06-17 PROCEDURE — 93016 CV STRESS TEST SUPVJ ONLY: CPT | Mod: HCNC,,, | Performed by: INTERNAL MEDICINE

## 2025-06-17 RX ORDER — AMINOPHYLLINE 25 MG/ML
75 INJECTION, SOLUTION INTRAVENOUS
Status: COMPLETED | OUTPATIENT
Start: 2025-06-17 | End: 2025-06-17

## 2025-06-17 RX ORDER — REGADENOSON 0.08 MG/ML
0.4 INJECTION, SOLUTION INTRAVENOUS
Status: COMPLETED | OUTPATIENT
Start: 2025-06-17 | End: 2025-06-17

## 2025-06-17 RX ADMIN — RUBIDIUM CHLORIDE RB-82 22.8 MILLICURIE: 150 INJECTION, SOLUTION INTRAVENOUS at 11:06

## 2025-06-17 RX ADMIN — AMINOPHYLLINE 75 MG: 25 INJECTION, SOLUTION INTRAVENOUS at 11:06

## 2025-06-17 RX ADMIN — REGADENOSON 0.4 MG: 0.08 INJECTION, SOLUTION INTRAVENOUS at 11:06

## 2025-06-17 RX ADMIN — RUBIDIUM CHLORIDE RB-82 23.1 MILLICURIE: 150 INJECTION, SOLUTION INTRAVENOUS at 11:06

## 2025-06-17 NOTE — TELEPHONE ENCOUNTER
----- Message from Pradeep Jerez MD sent at 6/17/2025  2:48 PM CDT -----  Stress test result is good. No major blockage to interfere with the blood flow to the heart muscle     Sincerely,  Pradeep Jerez MD.   Interventional Cardiologist  [facility], [place]      ----- Message -----  From: Nicolás Escobar III, MD  Sent: 6/17/2025   2:05 PM CDT  To: Pradeep Jerez MD

## 2025-06-27 NOTE — PLAN OF CARE
Bonnie has been notified.   Patient discharged home.  The patient does not have any home needs.  Family provided transportation home.  Neurosurgery clinic to schedule follow up appointment.    Future Appointments   Date Time Provider Department Center   3/23/2020  2:00 PM Yousuf Lockett MD [facility]        10/17/19 1542   Final Note   Assessment Type Final Discharge Note   Anticipated Discharge Disposition Home   Hospital Follow Up  Appt(s) scheduled?   (Neurosurgery clinic to schedule follow up appointment.)   Discharge plans and expectations educations in teach back method with documentation complete? Yes

## 2025-07-10 NOTE — PROGRESS NOTES
Admitted earlier this morning  H&P reviewed  Resume antihypertensive with ARB.  Substituted for losartan given nonformulary olmesartan home regimen  Will be able to resume patient's negative inotropic agents and nifedipine if heart rate permitting  I elected to pursue an echocardiogram study given apparent dizziness, presyncope.  Had prior history of falls    Agree with outlined plan in the H&P      Addendum: 1038am    Chart reviewed showing earlier viral screen positive for COVID-19  Medicine reconciliation reviewed and recently was prescribed with ciprofloxacin for abnormal urine analysis  Care everywhere review showed isolated Enterococcus.  He was treated with quinolones at that time with Cipro  -If patient is not having any recurrent symptoms then earlier Enterococcus can also be a colonizer  -I requested for a repeat UA studies today for completion of study given earlier fall events     Jyoti    Addendum 1:50 PM    Noted most recent urinalysis showing no clear evidence of underlying infectious process with negative nitrites, no significant pyuria even though he is a poor catch  I elected to hold further antibiotics as he really received almost a week course of Cipro with recent isolated Enterococcus likely a colonizer here.  He remained asymptomatic with no dysuria or frequency or hematuria       Pt to OR with anesthesia. Family aware and in waiting room.

## 2025-07-18 ENCOUNTER — TELEPHONE (OUTPATIENT)
Dept: FAMILY MEDICINE | Facility: CLINIC | Age: 75
End: 2025-07-18
Payer: MEDICARE

## 2025-07-18 RX ORDER — AMOXICILLIN AND CLAVULANATE POTASSIUM 875; 125 MG/1; MG/1
1 TABLET, FILM COATED ORAL 2 TIMES DAILY
Qty: 14 TABLET | Refills: 0 | Status: SHIPPED | OUTPATIENT
Start: 2025-07-18 | End: 2025-07-18 | Stop reason: SDUPTHER

## 2025-07-18 RX ORDER — AMOXICILLIN AND CLAVULANATE POTASSIUM 875; 125 MG/1; MG/1
1 TABLET, FILM COATED ORAL 2 TIMES DAILY
Qty: 14 TABLET | Refills: 0 | Status: SHIPPED | OUTPATIENT
Start: 2025-07-18

## 2025-07-18 NOTE — TELEPHONE ENCOUNTER
Spoke to pt's spouse, Lupe. She explained that the vet really didn't do any tests but stated that the pt might want to go to  for abx. They are requesting that his PCP send something in to the [facility] pharmacy.    Pt's last Tdap was 8/24/2016.

## 2025-07-18 NOTE — TELEPHONE ENCOUNTER
I suggest getting a tetanus shot from the pharmacy.  Yes it isn't 10 years but typically over five we should Re vaccinate if injured    In regards to antibiotics.  The antibiotics at work well for cat bites he has some problems with clindamycin, ciprofloxacin and Augmentin.      I selected Augmentin because it is appropriate and appears to have the least amount of problems associated with this use.  Follow blood pressure readings since this was the complaint you had when taking Augmentin previously    If any worsening or problems or questionable course of then proceed to the emergency room or urgent care.

## 2025-07-18 NOTE — TELEPHONE ENCOUNTER
Copied from CRM #[id number]. Topic: Medications - Medication Question  >> Jul 18, 2025 10:59 AM Lindy wrote:  Type: Patient Call Back    Who called:pt wife     What is the request in detail: pt wife is requesting a call back regarding some medication. Pt wife stated the pt was scratched by cat and vet told him to get on some medication because the cat was sick.Please contact to further discuss and advise.        Can the clinic reply by [facility]? call    Would the patient rather a call back or a response via [facility]? call    Best call back number:.[phone]

## 2025-07-18 NOTE — TELEPHONE ENCOUNTER
Copied from CRM #[id number]. Topic: Medications - Medication Authorization  >> Jul 18, 2025  3:52 PM Colleen wrote:  Type:  Pharmacy Calling to Clarify an RX    Name of Caller: [facility]   [facility] Name:[id number] - [place], [place] - [address] [facility]  [address] [facility] [address]  Phone: [phone] Fax: [phone]  Prescription Name: amoxicillin-clavulanate 875-125mg (AUGMENTIN) 875-125 mg per tablet  What do they need to clarify?: pt is allergic to Penicillin   Best Call Back Number:[phone]  Additional Information:

## 2025-08-21 RX ORDER — ATORVASTATIN CALCIUM 40 MG/1
TABLET, FILM COATED ORAL
Qty: 90 TABLET | Refills: 3 | Status: SHIPPED | OUTPATIENT
Start: 2025-08-21

## 2025-09-02 ENCOUNTER — TELEPHONE (OUTPATIENT)
Dept: CARDIOLOGY | Facility: CLINIC | Age: 75
End: 2025-09-02
Payer: MEDICARE

## (undated) DEVICE — CLIP SPRING 6MM

## (undated) DEVICE — PAD K-THERMIA 24IN X 60IN

## (undated) DEVICE — INSERTS STEALTH FIBRA SIZE 5

## (undated) DEVICE — Device

## (undated) DEVICE — SEE MEDLINE ITEM 157128

## (undated) DEVICE — SUT PROLENE 3-0 SH DA 36 BL

## (undated) DEVICE — DRESSING TELFA STRL 4X3 LF

## (undated) DEVICE — SEE MEDLINE ITEM 146292

## (undated) DEVICE — SEE MEDLINE ITEM 152515

## (undated) DEVICE — DRAPE INCISE IOBAN 2 23X17IN

## (undated) DEVICE — LOOP VESSEL BLUE MAXI

## (undated) DEVICE — PAD DEFIB CADENCE ADULT R2

## (undated) DEVICE — BUR BONE CUT MICRO TPS 3X3.8MM

## (undated) DEVICE — DRAPE STERI INSTRUMENT 1018

## (undated) DEVICE — CLOSURE SKIN STERI STRIP 1/2X4

## (undated) DEVICE — SUT 2/0 30IN ETHIBOND

## (undated) DEVICE — SUT PROLENE 5-0 24 C-1 BL

## (undated) DEVICE — SUT PROLENE 5-0 BL C-1 4-24

## (undated) DEVICE — SUT SILK 2-0 SH 18IN BLACK

## (undated) DEVICE — DRAIN CHEST DRY SUCTION

## (undated) DEVICE — DRAPE SPLIT STERILE

## (undated) DEVICE — DRESSING ADH ISLAND 3.6 X 14

## (undated) DEVICE — SEE MEDLINE ITEM 157117

## (undated) DEVICE — SUT VICRYL BR 1 GEN 27 CT-1

## (undated) DEVICE — CANNULA VESSEL FREE FLOW

## (undated) DEVICE — SUT PROLENE 4-0 RB-1 BL MO

## (undated) DEVICE — CANNULA AORTIC ROOT 12 GAUGE 9

## (undated) DEVICE — SYS VIRTUOSAPH PLUS EVM

## (undated) DEVICE — SEE MEDLINE ITEM 156905

## (undated) DEVICE — CATH THOR STND RGHT ANG 32FR

## (undated) DEVICE — TRAY HEART

## (undated) DEVICE — SUT CTD VICRYL 3-0 CR/SH

## (undated) DEVICE — SUT PROLENE 7-0 BV-1 30

## (undated) DEVICE — CONNECTOR Y 3/8X3/8X3/8

## (undated) DEVICE — BLADE SAW STERNAL 5/BX

## (undated) DEVICE — SUT 2-0 VICRYL / CT-1

## (undated) DEVICE — RETRACTOR OCTOBASE INSERT HOLD

## (undated) DEVICE — CONNECTOR 1/4X3/16X3/16 Y

## (undated) DEVICE — DRAPE THYROID WITH ARMBOARD

## (undated) DEVICE — SPONGE GAUZE 16PLY 4X4

## (undated) DEVICE — NDL SPINAL 18GX3.5 SPINOCAN

## (undated) DEVICE — PLEDGET SUT SOFT 3/8X3/16X1/16

## (undated) DEVICE — DRESSING AQUACEL SACRAL 9 X 9

## (undated) DEVICE — CATH THORACIC 32FR ST

## (undated) DEVICE — CANNULA 29/29FR VACUUM ASSIST

## (undated) DEVICE — DRESSING TRANS 4X4 TEGADERM

## (undated) DEVICE — BLOWER MISTER

## (undated) DEVICE — SUT PROLENE 4-0 SH BLU 36IN

## (undated) DEVICE — CANNULA IMA 1MM

## (undated) DEVICE — BANDAGE ACE ELASTIC 6"

## (undated) DEVICE — SUT 3-0 CTD VICRYL 27IN PS

## (undated) DEVICE — DRAPE C ARM 42 X 120 10/BX

## (undated) DEVICE — CARTRIDGE OIL

## (undated) DEVICE — SEE MEDLINE ITEM 152487

## (undated) DEVICE — SEE ITEM #153244

## (undated) DEVICE — FOGERTY SOFT JAW DISP 2/PK

## (undated) DEVICE — CANNULA MULTIPLE PERFUSIONSET

## (undated) DEVICE — SUT 4-0 12-18IN SILK BLACK

## (undated) DEVICE — BLADE BEAVER 15 DEG 1.5MM

## (undated) DEVICE — ELECTRODE REM PLYHSV RETURN 9

## (undated) DEVICE — BLADE 4IN EDGE INSULATED

## (undated) DEVICE — GAUZE SPONGE PEANUT STRL

## (undated) DEVICE — CORD BIPOLAR 12 FOOT

## (undated) DEVICE — SUT MONOCRYL 4-0 PS-1 UND

## (undated) DEVICE — WIRE INTRAMYOCARDIAL TEMP

## (undated) DEVICE — SUT ETHIBOND EXCEL 2-0 SH-2

## (undated) DEVICE — GAUZE SPONGE 4X4 12PLY

## (undated) DEVICE — PUNCH AORTIC 4.0MM 6/CASE

## (undated) DEVICE — KIT EVACUATOR 3-SPRING 1/8 DRN

## (undated) DEVICE — SUT 2/0 36IN COATED VICRYL

## (undated) DEVICE — CANNULA RETROGRADE CARDIOPLEG

## (undated) DEVICE — TUBE FRAZIER 5MM 2FT SOFT TIP

## (undated) DEVICE — ADHESIVE DERMABOND ADVANCED

## (undated) DEVICE — PIN DISTRACTION DISP 14MM
Type: IMPLANTABLE DEVICE | Site: SPINE CERVICAL | Status: NON-FUNCTIONAL
Removed: 2019-10-16

## (undated) DEVICE — KIT SAHARA DRAPE DRAW/LIFT

## (undated) DEVICE — SOL NS 1000CC

## (undated) DEVICE — SUT SILK BLK BR. 2 2-60

## (undated) DEVICE — HEMOSTAT SURGICEL NU-KNIT 6X9

## (undated) DEVICE — DRAPE OPMI STERILE

## (undated) DEVICE — COVER SET UP STRL 54X54 20/BX

## (undated) DEVICE — SOL NACL 0.9% INJ 500ML BG

## (undated) DEVICE — SUT VICRYL PLUS 3-0 FS1 27

## (undated) DEVICE — SEE MEDLINE ITEM 146417

## (undated) DEVICE — SYR 3CC LUER LOC

## (undated) DEVICE — SUT ETHIBOND XTRA 2-0 SH-2

## (undated) DEVICE — DRAPE SLUSH WARMER WITH DISC

## (undated) DEVICE — ELECTRODE PAD DEFIB STERILE

## (undated) DEVICE — NDL BOX COUNTER

## (undated) DEVICE — SOL 9P NACL IRR PIC IL

## (undated) DEVICE — CLIPS VASCU-STAT YELLOW

## (undated) DEVICE — DIFFUSER

## (undated) DEVICE — CONTAINER SPECIMEN STRL 4OZ

## (undated) DEVICE — MARKER SKIN STND TIP BLUE BARR

## (undated) DEVICE — SUT VICRYL 3-0 27 SH

## (undated) DEVICE — TUBING HF INSUFFLATION W/ FLTR

## (undated) DEVICE — BLADE 4 INCH EDGE UN-INS

## (undated) DEVICE — APPLICATOR CHLORAPREP ORN 26ML

## (undated) DEVICE — SUT 6 18IN STEEL MONO CCS

## (undated) DEVICE — SET DECANTER MEDICHOICE